# Patient Record
Sex: MALE | Race: BLACK OR AFRICAN AMERICAN | NOT HISPANIC OR LATINO | Employment: OTHER | URBAN - METROPOLITAN AREA
[De-identification: names, ages, dates, MRNs, and addresses within clinical notes are randomized per-mention and may not be internally consistent; named-entity substitution may affect disease eponyms.]

---

## 2017-01-06 RX ORDER — ALLOPURINOL 100 MG/1
TABLET ORAL
Qty: 90 TABLET | Refills: 0 | Status: SHIPPED | OUTPATIENT
Start: 2017-01-06 | End: 2017-04-12 | Stop reason: SDUPTHER

## 2017-01-10 ENCOUNTER — TELEPHONE (OUTPATIENT)
Dept: CARDIOLOGY | Facility: CLINIC | Age: 62
End: 2017-01-10

## 2017-01-10 NOTE — TELEPHONE ENCOUNTER
----- Message from Kaylynn Mueller sent at 1/9/2017  4:33 PM CST -----  Contact: Elsie/wife  Elsie would like to know if his appointment location can be change to the [facility] location  on 1/30/17. Please call her at [phone].    Thanks  Td

## 2017-01-10 NOTE — TELEPHONE ENCOUNTER
I have attempted without success to contact this patient by phone to r/s [employer] ICD check.  No answer, left message to return call to device clinic at [phone].

## 2017-01-11 NOTE — TELEPHONE ENCOUNTER
Wife wanted to move clinic appt to [facility] because of transportation issues.  Advised that device clinic times and locations are dependent on the reps from the companies.  On Jan 30th the [employer] reps will only be at the [facility] clinic.  Wife verbalized understanding, will try to arrange transportation with different company.  Offered appt at [facility] on Feb 23rd, wife states pt cannot wait that long to have his ICD checked, will keep [facility] appt on 1/30/17.

## 2017-01-23 ENCOUNTER — OFFICE VISIT (OUTPATIENT)
Dept: OPHTHALMOLOGY | Facility: CLINIC | Age: 62
End: 2017-01-23
Payer: COMMERCIAL

## 2017-01-23 DIAGNOSIS — H40.1122 PRIMARY OPEN ANGLE GLAUCOMA OF LEFT EYE, MODERATE STAGE: Primary | ICD-10-CM

## 2017-01-23 PROCEDURE — 92012 INTRM OPH EXAM EST PATIENT: CPT | Mod: S$GLB,,, | Performed by: OPHTHALMOLOGY

## 2017-01-23 PROCEDURE — 99999 PR PBB SHADOW E&M-EST. PATIENT-LVL II: CPT | Mod: PBBFAC,,, | Performed by: OPHTHALMOLOGY

## 2017-01-23 NOTE — MR AVS SNAPSHOT
[facility] - Ophthalmology  [address] [place] [address]  Phone: [phone]  Fax: [phone]                  Yan Choudhury   2017 10:00 AM   Office Visit    Description:  Male : 1955   Provider:  LOKI Araiza MD   Department:  [facility] - Ophthalmology           Reason for Visit     Glaucoma           Diagnoses this Visit        Comments    Primary open angle glaucoma of left eye, moderate stage    -  Primary            To Do List           Future Appointments        Provider Department Dept Phone    2017 9:15 AM PACEMAKERCLGISELLE GARCIA'DIMITRI DESAI'Dimitri - Special Cardiology [phone]    2017 10:40 AM Kevin Lindsay MD [facility] - General Surgery [phone]    2017 8:00 AM LOKI Araiza MD [facility] Ophthalmology [phone]    2017 10:40 AM Sandra Estevez MD [facility] Internal Medicine [phone]      Goals (5 Years of Data)     None      Follow-Up and Disposition     Return in about 3 months (around 2017).      [facility]     [facility] Line -  Assistance  Registered nurses in the [facility] Call Center provide clinical advisement, health education, appointment booking, and other advisory services.  Call for this free service at [phone].             Medications           Message regarding Medications     Verify the changes and/or additions to your medication regime listed below are the same as discussed with your clinician today.  If any of these changes or additions are incorrect, please notify your healthcare provider.             Verify that the below list of medications is an accurate representation of the medications you are currently taking.  If none reported, the list may be blank. If incorrect, please contact your healthcare provider. Carry this list with you in case of emergency.           Current Medications     allopurinol (ZYLOPRIM) 100 MG tablet TAKE 1 TABLET (100 MG TOTAL) BY MOUTH ONCE DAILY.    aspirin (ECOTRIN) 81 MG EC  "tablet Take 81 mg by mouth once daily.    BD INSULIN SYRINGE ULTRA-FINE 1/2 mL 31 x 5/16" Syrg USE AS DIRECTED TO INJECT INSULIN TWO TIMES DAILY    BD INSULIN SYRINGE ULTRA-FINE 1/2 mL 31 x 5/16" Syrg USE AS DIRECTED TO INJECT INSULIN TWO TIMES DAILY    bumetanide (BUMEX) 2 MG tablet TAKE 1 TABLET (2 MG TOTAL) BY MOUTH 2 (TWO) TIMES DAILY.    carvedilol (COREG) 25 MG tablet Take 12.5 mg by mouth 2 (two) times daily with meals.    guaifenesin (MUCINEX) 600 mg 12 hr tablet Take 1,200 mg by mouth 2 (two) times daily as needed.     insulin glargine (LANTUS) 100 unit/mL injection 50 units bid    insulin needles, disposable, (BD ULTRA-FINE JOSLYN PEN NEEDLES) 32 x 5/32 " Ndle 1 each by Misc.(Non-Drug; Combo Route) route 4 (four) times daily.    IRON/VITAMIN B COMPLEX (GERITOL ORAL) Take by mouth.    pantoprazole (PROTONIX) 40 MG tablet TAKE 1 TABLET BY MOUTH EVERY DAY    potassium chloride SA (K-DUR,KLOR-CON) 10 MEQ tablet TAKE 1 TABLET BY MOUTH EVERY DAY    simvastatin (ZOCOR) 10 MG tablet TAKE 1 TABLET EVERY EVENING FOR CHOLESTEROL           Clinical Reference Information           Allergies as of 1/23/2017     No Known Allergies      Immunizations Administered on Date of Encounter - 1/23/2017     None      "

## 2017-01-23 NOTE — PROGRESS NOTES
"    ===============================  Yan Choudhury,   61 y.o. male   Last visit JC: :9/23/2016   Last visit eye dept. 9/23/2016  [facility]:  Corrected distance visual acuity was NLP in the right eye and 20/40 in the left eye.  Tonometry     Tonometry (Applanation, 10:21 AM)      Right Left   Pressure  17                  Not recorded         Not recorded        Chief Complaint   Patient presents with    Glaucoma     4 month iop check/  dilate os        HPI     Glaucoma    Additional comments: 4 month iop check/  dilate os           Comments   --DM since 2000  --H/O BDR and DME OS  AVASTIN OS 2-12-14 / LUCENTIS OS 5/1/14 AND 6/25/14   --Focal OS June 2011  --VRTX/VMA OS "would not expect improvement with treatment secondary to   VRTX"  --Prosthesis OD secondary to trauma  --COAG   OFF GTTS  Tmax 25  (-1)  Gonio Open  Low RNFL  Last VF Bjerrum OS       Last edited by Estella Forbes on 1/23/2017 10:15 AM. (History)      Read Studies:   Vitals    ________________  1/23/2017  1. Primary open angle glaucoma of left eye, moderate stage      .  T 17   ervinla r   Former  copag pt   rtc 3-4 months  consdier drops agin       ===========================    "

## 2017-02-02 ENCOUNTER — CLINICAL SUPPORT (OUTPATIENT)
Dept: CARDIOLOGY | Facility: CLINIC | Age: 62
End: 2017-02-02
Payer: COMMERCIAL

## 2017-02-02 DIAGNOSIS — I50.9 ACUTE ON CHRONIC CONGESTIVE HEART FAILURE, UNSPECIFIED CONGESTIVE HEART FAILURE TYPE: ICD-10-CM

## 2017-02-02 DIAGNOSIS — R06.02 SOB (SHORTNESS OF BREATH): ICD-10-CM

## 2017-02-02 PROCEDURE — 93284 PRGRMG EVAL IMPLANTABLE DFB: CPT | Mod: S$GLB,,, | Performed by: INTERNAL MEDICINE

## 2017-02-06 RX ORDER — PANTOPRAZOLE SODIUM 40 MG/1
TABLET, DELAYED RELEASE ORAL
Qty: 30 TABLET | Refills: 4 | Status: SHIPPED | OUTPATIENT
Start: 2017-02-06 | End: 2017-07-27 | Stop reason: SDUPTHER

## 2017-02-21 DIAGNOSIS — I10 ESSENTIAL HYPERTENSION: Primary | ICD-10-CM

## 2017-02-21 RX ORDER — LISINOPRIL 5 MG/1
5 TABLET ORAL DAILY
Qty: 90 TABLET | Refills: 3 | Status: SHIPPED | OUTPATIENT
Start: 2017-02-21 | End: 2018-02-24 | Stop reason: SDUPTHER

## 2017-02-21 NOTE — TELEPHONE ENCOUNTER
----- Message from Jaquelin Toscano sent at 2/21/2017 12:47 PM CST -----  Contact: pt wife   Pt wife was calling to get a refill on lisinopril call to [facility] on [address]. Pt wife can be reach at [phone].

## 2017-04-06 ENCOUNTER — OFFICE VISIT (OUTPATIENT)
Dept: URGENT CARE | Facility: CLINIC | Age: 62
End: 2017-04-06
Payer: COMMERCIAL

## 2017-04-06 ENCOUNTER — HOSPITAL ENCOUNTER (OUTPATIENT)
Dept: RADIOLOGY | Facility: HOSPITAL | Age: 62
Discharge: HOME | End: 2017-04-06
Attending: NURSE PRACTITIONER
Payer: COMMERCIAL

## 2017-04-06 ENCOUNTER — OFFICE VISIT (OUTPATIENT)
Dept: SURGERY | Facility: CLINIC | Age: 62
End: 2017-04-06
Payer: COMMERCIAL

## 2017-04-06 VITALS
DIASTOLIC BLOOD PRESSURE: 72 MMHG | HEART RATE: 59 BPM | TEMPERATURE: 99 F | SYSTOLIC BLOOD PRESSURE: 120 MMHG | WEIGHT: 201.25 LBS | BODY MASS INDEX: 30.6 KG/M2

## 2017-04-06 VITALS
WEIGHT: 202.69 LBS | BODY MASS INDEX: 30.72 KG/M2 | TEMPERATURE: 98 F | HEART RATE: 60 BPM | HEIGHT: 68 IN | SYSTOLIC BLOOD PRESSURE: 100 MMHG | OXYGEN SATURATION: 99 % | DIASTOLIC BLOOD PRESSURE: 60 MMHG

## 2017-04-06 DIAGNOSIS — J18.9 PNEUMONIA OF RIGHT LOWER LOBE DUE TO INFECTIOUS ORGANISM: Primary | ICD-10-CM

## 2017-04-06 DIAGNOSIS — R05.9 COUGH: ICD-10-CM

## 2017-04-06 DIAGNOSIS — R06.2 WHEEZING: ICD-10-CM

## 2017-04-06 DIAGNOSIS — Z93.3 COLOSTOMY IN PLACE: Primary | ICD-10-CM

## 2017-04-06 PROCEDURE — 99212 OFFICE O/P EST SF 10 MIN: CPT | Mod: S$GLB,,, | Performed by: SURGERY

## 2017-04-06 PROCEDURE — 3078F DIAST BP <80 MM HG: CPT | Mod: S$GLB,,, | Performed by: NURSE PRACTITIONER

## 2017-04-06 PROCEDURE — 1160F RVW MEDS BY RX/DR IN RCRD: CPT | Mod: S$GLB,,, | Performed by: SURGERY

## 2017-04-06 PROCEDURE — 3074F SYST BP LT 130 MM HG: CPT | Mod: S$GLB,,, | Performed by: SURGERY

## 2017-04-06 PROCEDURE — 3074F SYST BP LT 130 MM HG: CPT | Mod: S$GLB,,, | Performed by: NURSE PRACTITIONER

## 2017-04-06 PROCEDURE — 99999 PR PBB SHADOW E&M-EST. PATIENT-LVL IV: CPT | Mod: PBBFAC,,, | Performed by: NURSE PRACTITIONER

## 2017-04-06 PROCEDURE — 99214 OFFICE O/P EST MOD 30 MIN: CPT | Mod: S$GLB,,, | Performed by: NURSE PRACTITIONER

## 2017-04-06 PROCEDURE — 71020 XR CHEST PA AND LATERAL: CPT | Mod: 26,,, | Performed by: RADIOLOGY

## 2017-04-06 PROCEDURE — 94640 AIRWAY INHALATION TREATMENT: CPT | Mod: S$GLB,,, | Performed by: NURSE PRACTITIONER

## 2017-04-06 PROCEDURE — 3078F DIAST BP <80 MM HG: CPT | Mod: S$GLB,,, | Performed by: SURGERY

## 2017-04-06 PROCEDURE — 1160F RVW MEDS BY RX/DR IN RCRD: CPT | Mod: S$GLB,,, | Performed by: NURSE PRACTITIONER

## 2017-04-06 PROCEDURE — 99999 PR PBB SHADOW E&M-EST. PATIENT-LVL III: CPT | Mod: PBBFAC,,, | Performed by: SURGERY

## 2017-04-06 PROCEDURE — 71020 XR CHEST PA AND LATERAL: CPT | Mod: TC,PO

## 2017-04-06 RX ORDER — IPRATROPIUM BROMIDE AND ALBUTEROL SULFATE 2.5; .5 MG/3ML; MG/3ML
3 SOLUTION RESPIRATORY (INHALATION)
Status: COMPLETED | OUTPATIENT
Start: 2017-04-06 | End: 2017-04-06

## 2017-04-06 RX ORDER — LEVOFLOXACIN 750 MG/1
750 TABLET ORAL DAILY
Qty: 5 TABLET | Refills: 0 | Status: SHIPPED | OUTPATIENT
Start: 2017-04-06 | End: 2017-04-11

## 2017-04-06 RX ADMIN — IPRATROPIUM BROMIDE AND ALBUTEROL SULFATE 3 ML: 2.5; .5 SOLUTION RESPIRATORY (INHALATION) at 01:04

## 2017-04-06 NOTE — MR AVS SNAPSHOT
[facility] - Urgent Care  [address] [place] [address]  Phone: [phone]  Fax: [phone]                  Yan Choudhury   2017 1:00 PM   Office Visit    Description:  Male : 1955   Provider:  Quinn Charlton NP   Department:  [facility] - Urgent Care           Reason for Visit     URI     Wheezing           Diagnoses this Visit        Comments    Pneumonia of right lower lobe due to infectious organism    -  Primary     Wheezing         Cough                To Do List           Future Appointments        Provider Department Dept Phone    4/10/2017 1:00 PM JORGE Dhillon [facility] - Cardiology [phone]    2017 8:00 AM LOKI Araiza MD [facility] Ophthalmology [phone]    5/15/2017 8:00 AM HOME MONITOR DEVICE CHECK [facility] Arrhythmia Procedures [phone]    2017 10:40 AM Sandra Estevez MD [facility] Internal Medicine [phone]      Goals (5 Years of Data)     None      Follow-Up and Disposition     Return if symptoms worsen or fail to improve.       These Medications        Disp Refills Start End    levoFLOXacin (LEVAQUIN) 750 MG tablet 5 tablet 0 2017    Take 1 tablet (750 mg total) by mouth once daily. - Oral    Pharmacy: [facility]/pharmacy #[id number] - [place], [place] - [address] AT AT [facility] Ph #: [phone]         [facility]     [facility] -  Assistance  Unless otherwise directed by your provider, please contact [facility], our nurse care line that is available for  assistance.     Registered nurses in the [facility] Center provide: appointment scheduling, clinical advisement, health education, and other advisory services.  Call: [phone] (toll free)               Medications           Message regarding Medications     Verify the changes and/or additions to your medication regime listed below are the same as discussed with your clinician today.  If any of these changes or additions  "are incorrect, please notify your healthcare provider.        START taking these NEW medications        Refills    levoFLOXacin (LEVAQUIN) 750 MG tablet 0    Sig: Take 1 tablet (750 mg total) by mouth once daily.    Class: Normal    Route: Oral      These medications were administered today        Dose Freq    albuterol-ipratropium 2.5mg-0.5mg/3mL nebulizer solution 3 mL 3 mL Clinic/HOD 1 time    Sig: Take 3 mLs by nebulization one time.    Class: Normal    Route: Nebulization           Verify that the below list of medications is an accurate representation of the medications you are currently taking.  If none reported, the list may be blank. If incorrect, please contact your healthcare provider. Carry this list with you in case of emergency.           Current Medications     allopurinol (ZYLOPRIM) 100 MG tablet TAKE 1 TABLET (100 MG TOTAL) BY MOUTH ONCE DAILY.    aspirin (ECOTRIN) 81 MG EC tablet Take 81 mg by mouth once daily.    BD INSULIN SYRINGE ULTRA-FINE 1/2 mL 31 x 5/16" Syrg USE AS DIRECTED TO INJECT INSULIN TWO TIMES DAILY    BD INSULIN SYRINGE ULTRA-FINE 1/2 mL 31 x 5/16" Syrg USE AS DIRECTED TO INJECT INSULIN TWO TIMES DAILY    bumetanide (BUMEX) 2 MG tablet TAKE 1 TABLET (2 MG TOTAL) BY MOUTH 2 (TWO) TIMES DAILY.    carvedilol (COREG) 25 MG tablet Take 12.5 mg by mouth 2 (two) times daily with meals.    guaifenesin (MUCINEX) 600 mg 12 hr tablet Take 1,200 mg by mouth 2 (two) times daily as needed.     insulin glargine (LANTUS) 100 unit/mL injection 50 units bid    insulin needles, disposable, (BD ULTRA-FINE JOSLYN PEN NEEDLES) 32 x 5/32 " Ndle 1 each by Misc.(Non-Drug; Combo Route) route 4 (four) times daily.    IRON/VITAMIN B COMPLEX (GERITOL ORAL) Take by mouth.    levoFLOXacin (LEVAQUIN) 750 MG tablet Take 1 tablet (750 mg total) by mouth once daily.    lisinopril (PRINIVIL,ZESTRIL) 5 MG tablet Take 1 tablet (5 mg total) by mouth once daily.    pantoprazole (PROTONIX) 40 MG tablet TAKE 1 TABLET BY " "MOUTH EVERY DAY FOR ACID REFLUX    potassium chloride SA (K-DUR,KLOR-CON) 10 MEQ tablet TAKE 1 TABLET BY MOUTH EVERY DAY    simvastatin (ZOCOR) 10 MG tablet TAKE 1 TABLET EVERY EVENING FOR CHOLESTEROL           Clinical Reference Information           Your Vitals Were     BP Pulse Temp Height    100/60 (BP Location: Right arm, Patient Position: Sitting, BP Method: Manual) 60 97.8 °F (36.6 °C) (Tympanic) 5' 8" (1.727 m)    Weight SpO2 BMI    92 kg (202 lb 11.4 oz) 99% 30.82 kg/m2      Blood Pressure          Most Recent Value    BP  100/60      Allergies as of 4/6/2017     No Known Allergies      Immunizations Administered on Date of Encounter - 4/6/2017     None      Orders Placed During Today's Visit     Future Labs/Procedures Expected by Expires    X-Ray Chest PA And Lateral  4/6/2017 4/6/2018      Administrations This Visit     albuterol-ipratropium 2.5mg-0.5mg/3mL nebulizer solution 3 mL     Admin Date Action Dose Route Administered By             04/06/2017 Given 3 mL Nebulization Linda Calvillo, JAN                      Instructions      Pneumonia (Adult)  Pneumonia is an infection deep within the lungs. It is in the small air sacs (alveoli). Pneumonia may be caused by a virus or bacteria. Pneumonia caused by bacteria is usually treated with an antibiotic. Severe cases may need to be treated in the hospital. Milder cases can be treated at home. Symptoms usually start to get better during the first 2 days of treatment.    Home care  Follow these guidelines when caring for yourself at home:  · Rest at home for the first 2 to 3 days, or until you feel stronger. Dont let yourself get overly tired when you go back to your activities.  · Stay away from cigarette smoke - yours or other peoples.  · You may use acetaminophen or ibuprofen to control fever or pain, unless another medicine was prescribed. If you have chronic liver or kidney disease, talk with your health care provider before using these medicines. " Also talk with your provider if youve had a stomach ulcer or GI bleeding. Dont give aspirin to anyone younger than 18 years of age who is ill with a fever. It may cause severe liver damage.  · Your appetite may be poor, so a light diet is fine.  · Drink 6 to 8 glasses of fluids every day to make sure you are getting enough fluids. Beverages can include water, sport drinks, sodas without caffeine, juices, tea, or soup. Fluids will help loosen secretions in the lung. This will make it easier for you to cough up the phlegm (sputum). If you also have heart or kidney disease, check with your health care provider before you drink extra fluids.  · Take antibiotic medicine prescribed until it is all gone, even if you are feeling better after a few days.  Follow-up care  Follow up with your health care provider in the next 2 to 3 days, or as advised. This is to be sure the medicine is helping you get better.  If you are 65 or older, you should get a pneumococcal vaccine and a yearly flu (influenza) shot. You should also get these vaccines if you have chronic lung disease like asthma, emphysema, or COPD. Ask your provider about this.  When to seek medical advice  Call your health care provider right away if any of these occur:  · You dont get better within the first 48 hours of treatment  · Shortness of breath gets worse  · Rapid breathing (more than 25 breaths per minute)  · Coughing up blood  · Chest pain gets worse with breathing  · Fever of 102°F (38°C) or higher that doesnt get better with fever medicine  · Weakness, dizziness, or fainting that gets worse  · Thirst or dry mouth that gets worse  · Sinus pain, headache, or a stiff neck  · Chest pain not caused by coughing  Date Last Reviewed: 12/23/2014  © 0125-1187 [employer]. [address] [place] [address], [place] [address]. All rights reserved. This information is not intended as a substitute for professional medical care. Always follow your healthcare  professional's instructions.             Language Assistance Services     ATTENTION: Language assistance services are available, free of charge. Please call [phone].      ATENCIÓN: Si habla sahara, tiene a roman disposición servicios gratuitos de asistencia lingüística. Llame al [phone].     CHÚ Ý: N?u b?n nói Ti?ng Vi?t, có các d?ch v? h? tr? ngôn ng? mi?n phí dành cho b?n. G?i s? [phone].         [facility] complies with applicable Federal civil rights laws and does not discriminate on the basis of race, color, national origin, age, disability, or sex.

## 2017-04-06 NOTE — PROGRESS NOTES
Subjective:       Patient ID: Yan Choudhury is a 61 y.o. male.    Check on colostomy    Chief Complaint: Follow-up (6 months)    HPI Comments: He underwent a Aguila procedure severe diverticulitis.  He developed a wound infection postoperatively.  This was treated by local wound care.  He has a left lower quadrant colostomy that's functioning.  He notices that side of his abdominal wall protrudes a little bit more.  Currently he has a cough.    The patient gets fatigue and shortness of breath with any activity    Review of Systems   Constitutional: Positive for fever.   Respiratory: Positive for cough and shortness of breath.    Cardiovascular: Negative.    Gastrointestinal:        Hospital wall protrudes more on the left around the colostomy than on the right   Endocrine: Negative.    Genitourinary: Negative.        Objective:      Physical Exam   Constitutional: No distress.   Chronically ill in appearance   Cardiovascular: Normal rate and regular rhythm.    Pulmonary/Chest: Effort normal. No respiratory distress. He has no wheezes.   Cough, bilateral rhonchi   Abdominal: Bowel sounds are normal.   There is a left lower quadrant colostomy.  There is protrusion of the left lower abdominal wall greater than the right, there is a reducible umbilical hernia.   Vitals reviewed.      Assessment:      sigmoid resection and end colostomy for severe diverticulitis.  Small umbilical hernia, probable peristomal hernia  Plan:     at this point I would not recommend operative intervention for the umbilical or probable parastomal hernia as the risk of cardiovascular/pulmonary complications of any surgical intervention would outweigh the benefits at this point.    Patient should follow-up with surgery if there are concerns about his colostomy.    This was discussed with the patient and his wife

## 2017-04-06 NOTE — PATIENT INSTRUCTIONS
You have developed a hernia around your colostomy and a small hernia at your bellybutton.    I would not recommend any surgical intervention unless you ran into significant problems.    If you ever have a lot of pain around her colostomy on the colostomy stops working please let us know.  We will otherwise see you back as an as-needed basis

## 2017-04-06 NOTE — MR AVS SNAPSHOT
[facility] Surgery  [address] [place] [address]  Phone: [phone]  Fax: [phone]                  Yan Choudhury   2017 10:40 AM   Office Visit    Description:  Male : 1955   Provider:  Kevin Lindsay MD   Department:  [facility] Surgery           Reason for Visit     Follow-up           Diagnoses this Visit        Comments    Colostomy in place    -  Primary            To Do List           Future Appointments        Provider Department Dept Phone    2017 8:00 AM LOKI Araiza MD [facility] - Ophthalmology [phone]    5/15/2017 8:00 AM HOME MONITOR DEVICE CHECK [facility] Arrhythmia Procedures [phone]    2017 10:40 AM Sandra Estevez MD [facility] Internal Medicine [phone]      Goals (5 Years of Data)     None      [facility]     [facility] Nurse Care Line -  Assistance  Unless otherwise directed by your provider, please contact [facility], our nurse care line that is available for  assistance.     Registered nurses in the [facility] Center provide: appointment scheduling, clinical advisement, health education, and other advisory services.  Call: [phone] (toll free)               Medications           Message regarding Medications     Verify the changes and/or additions to your medication regime listed below are the same as discussed with your clinician today.  If any of these changes or additions are incorrect, please notify your healthcare provider.             Verify that the below list of medications is an accurate representation of the medications you are currently taking.  If none reported, the list may be blank. If incorrect, please contact your healthcare provider. Carry this list with you in case of emergency.           Current Medications     allopurinol (ZYLOPRIM) 100 MG tablet TAKE 1 TABLET (100 MG TOTAL) BY MOUTH ONCE DAILY.    aspirin (ECOTRIN) 81 MG EC tablet Take 81 mg by mouth once daily.    BD INSULIN  "SYRINGE ULTRA-FINE 1/2 mL 31 x 5/16" Syrg USE AS DIRECTED TO INJECT INSULIN TWO TIMES DAILY    BD INSULIN SYRINGE ULTRA-FINE 1/2 mL 31 x 5/16" Syrg USE AS DIRECTED TO INJECT INSULIN TWO TIMES DAILY    bumetanide (BUMEX) 2 MG tablet TAKE 1 TABLET (2 MG TOTAL) BY MOUTH 2 (TWO) TIMES DAILY.    carvedilol (COREG) 25 MG tablet Take 12.5 mg by mouth 2 (two) times daily with meals.    guaifenesin (MUCINEX) 600 mg 12 hr tablet Take 1,200 mg by mouth 2 (two) times daily as needed.     insulin glargine (LANTUS) 100 unit/mL injection 50 units bid    insulin needles, disposable, (BD ULTRA-FINE JOSLYN PEN NEEDLES) 32 x 5/32 " Ndle 1 each by Misc.(Non-Drug; Combo Route) route 4 (four) times daily.    IRON/VITAMIN B COMPLEX (GERITOL ORAL) Take by mouth.    lisinopril (PRINIVIL,ZESTRIL) 5 MG tablet Take 1 tablet (5 mg total) by mouth once daily.    pantoprazole (PROTONIX) 40 MG tablet TAKE 1 TABLET BY MOUTH EVERY DAY FOR ACID REFLUX    potassium chloride SA (K-DUR,KLOR-CON) 10 MEQ tablet TAKE 1 TABLET BY MOUTH EVERY DAY    simvastatin (ZOCOR) 10 MG tablet TAKE 1 TABLET EVERY EVENING FOR CHOLESTEROL           Clinical Reference Information           Your Vitals Were     BP Pulse Temp Weight BMI    120/72 (BP Location: Right arm, Patient Position: Sitting) 59 98.5 °F (36.9 °C) (Oral) 91.3 kg (201 lb 4.5 oz) 30.6 kg/m2      Blood Pressure          Most Recent Value    BP  120/72      Allergies as of 4/6/2017     No Known Allergies      Immunizations Administered on Date of Encounter - 4/6/2017     None      Instructions    You have developed a hernia around your colostomy and a small hernia at your bellybutton.    I would not recommend any surgical intervention unless you ran into significant problems.    If you ever have a lot of pain around her colostomy on the colostomy stops working please let us know.  We will otherwise see you back as an as-needed basis       Language Assistance Services     ATTENTION: Language assistance services " are available, free of charge. Please call [phone].      ATENCIÓN: Si habla wilfredoañol, tiene a roman disposición servicios gratuitos de asistencia lingüística. Llame al [phone].     CHÚ Ý: N?u b?n nói Ti?ng Vi?t, có các d?ch v? h? tr? ngôn ng? mi?n phí dành cho b?n. G?i s? [phone].         [facility] - General Surgery complies with applicable Federal civil rights laws and does not discriminate on the basis of race, color, national origin, age, disability, or sex.

## 2017-04-06 NOTE — PATIENT INSTRUCTIONS
Pneumonia (Adult)  Pneumonia is an infection deep within the lungs. It is in the small air sacs (alveoli). Pneumonia may be caused by a virus or bacteria. Pneumonia caused by bacteria is usually treated with an antibiotic. Severe cases may need to be treated in the hospital. Milder cases can be treated at home. Symptoms usually start to get better during the first 2 days of treatment.    Home care  Follow these guidelines when caring for yourself at home:  · Rest at home for the first 2 to 3 days, or until you feel stronger. Dont let yourself get overly tired when you go back to your activities.  · Stay away from cigarette smoke - yours or other peoples.  · You may use acetaminophen or ibuprofen to control fever or pain, unless another medicine was prescribed. If you have chronic liver or kidney disease, talk with your health care provider before using these medicines. Also talk with your provider if youve had a stomach ulcer or GI bleeding. Dont give aspirin to anyone younger than 18 years of age who is ill with a fever. It may cause severe liver damage.  · Your appetite may be poor, so a light diet is fine.  · Drink 6 to 8 glasses of fluids every day to make sure you are getting enough fluids. Beverages can include water, sport drinks, sodas without caffeine, juices, tea, or soup. Fluids will help loosen secretions in the lung. This will make it easier for you to cough up the phlegm (sputum). If you also have heart or kidney disease, check with your health care provider before you drink extra fluids.  · Take antibiotic medicine prescribed until it is all gone, even if you are feeling better after a few days.  Follow-up care  Follow up with your health care provider in the next 2 to 3 days, or as advised. This is to be sure the medicine is helping you get better.  If you are 65 or older, you should get a pneumococcal vaccine and a yearly flu (influenza) shot. You should also get these vaccines if you have  chronic lung disease like asthma, emphysema, or COPD. Ask your provider about this.  When to seek medical advice  Call your health care provider right away if any of these occur:  · You dont get better within the first 48 hours of treatment  · Shortness of breath gets worse  · Rapid breathing (more than 25 breaths per minute)  · Coughing up blood  · Chest pain gets worse with breathing  · Fever of 102°F (38°C) or higher that doesnt get better with fever medicine  · Weakness, dizziness, or fainting that gets worse  · Thirst or dry mouth that gets worse  · Sinus pain, headache, or a stiff neck  · Chest pain not caused by coughing  Date Last Reviewed: 12/23/2014  © 6086-3269 [email or web address]. [address] [place] [address], [place] [address]. All rights reserved. This information is not intended as a substitute for professional medical care. Always follow your healthcare professional's instructions.

## 2017-04-06 NOTE — PROGRESS NOTES
Subjective:       Patient ID: Yan Choudhury is a 61 y.o. male.    Chief Complaint: URI and Wheezing    HPI Comments: Pt is a 61 year old male to clinic today with complaints of cough, congestion, and wheezing that began 2 weeks ago.     URI    This is a new problem. The current episode started 1 to 4 weeks ago (2 weeks). The problem has been gradually worsening. There has been no fever. Associated symptoms include congestion, a plugged ear sensation, rhinorrhea, sinus pain, sneezing and wheezing. Pertinent negatives include no abdominal pain, chest pain, coughing, diarrhea, dysuria, ear pain, headaches, joint pain, joint swelling, nausea, neck pain, rash, sore throat or vomiting. Treatments tried: mucinex. nyquil. The treatment provided mild relief.     Review of Systems   Constitutional: Negative for chills, diaphoresis, fatigue and fever.   HENT: Positive for congestion, postnasal drip, rhinorrhea, sinus pressure and sneezing. Negative for ear discharge, ear pain, sore throat and trouble swallowing.    Eyes: Negative for pain.   Respiratory: Positive for shortness of breath and wheezing. Negative for cough and chest tightness.    Cardiovascular: Negative for chest pain and palpitations.   Gastrointestinal: Negative for abdominal pain, diarrhea, nausea and vomiting.   Genitourinary: Negative for dysuria.   Musculoskeletal: Negative for back pain, joint pain, myalgias and neck pain.   Skin: Negative for rash.   Neurological: Negative for dizziness, light-headedness and headaches.       Objective:      Physical Exam   Constitutional: He is oriented to person, place, and time. He appears well-developed and well-nourished. No distress.   HENT:   Head: Normocephalic.   Right Ear: Tympanic membrane, external ear and ear canal normal. No tenderness. Tympanic membrane is not bulging.   Left Ear: Tympanic membrane, external ear and ear canal normal. No tenderness. Tympanic membrane is not bulging.   Nose: Mucosal edema and  rhinorrhea present. Right sinus exhibits maxillary sinus tenderness. Right sinus exhibits no frontal sinus tenderness. Left sinus exhibits no frontal sinus tenderness.   Mouth/Throat: Uvula is midline, oropharynx is clear and moist and mucous membranes are normal. No oropharyngeal exudate, posterior oropharyngeal edema or posterior oropharyngeal erythema.   Eyes: Conjunctivae and EOM are normal. Pupils are equal, round, and reactive to light.   Neck: Normal range of motion. Neck supple.   Cardiovascular: Normal rate, regular rhythm, S1 normal, S2 normal and normal heart sounds.  Exam reveals no gallop and no friction rub.    No murmur heard.  Pulmonary/Chest: Effort normal. No accessory muscle usage. No apnea, no tachypnea and no bradypnea. No respiratory distress. He has no decreased breath sounds. He has wheezes in the right upper field, the right lower field, the left upper field and the left lower field. He has no rhonchi. He has no rales.   Transmitted airway noise     Lymphadenopathy:        Head (right side): No submental, no submandibular and no tonsillar adenopathy present.        Head (left side): No submental, no submandibular and no tonsillar adenopathy present.     He has no cervical adenopathy.   Neurological: He is alert and oriented to person, place, and time.   Skin: Skin is warm and dry. No rash noted. He is not diaphoretic.   Psychiatric: He has a normal mood and affect. His speech is normal and behavior is normal.   Nursing note and vitals reviewed.      Assessment:       1. Pneumonia of right lower lobe due to infectious organism    2. Wheezing    3. Cough        Plan:   Pneumonia of right lower lobe due to infectious organism  -     levoFLOXacin (LEVAQUIN) 750 MG tablet; Take 1 tablet (750 mg total) by mouth once daily.  Dispense: 5 tablet; Refill: 0    Wheezing  -     X-Ray Chest PA And Lateral; Future; Expected date: 4/6/17  -     albuterol-ipratropium 2.5mg-0.5mg/3mL nebulizer solution 3 mL;  Take 3 mLs by nebulization one time.    Cough  -     X-Ray Chest PA And Lateral; Future; Expected date: 4/6/17  -     albuterol-ipratropium 2.5mg-0.5mg/3mL nebulizer solution 3 mL; Take 3 mLs by nebulization one time.      Recommend F/U with PCP at completion of abx for repeat xray.  Recommend F/U with cards for eval and management of xray finding.    Antibiotic Therapy  Take antibiotics for entire course.  Do not save medications for later, all medications must be taken for full regimen.  Follow up with PCP or ER if symptoms do not improve or worsen.

## 2017-04-10 ENCOUNTER — LAB VISIT (OUTPATIENT)
Dept: LAB | Facility: HOSPITAL | Age: 62
End: 2017-04-10
Attending: INTERNAL MEDICINE
Payer: COMMERCIAL

## 2017-04-10 ENCOUNTER — OFFICE VISIT (OUTPATIENT)
Dept: CARDIOLOGY | Facility: CLINIC | Age: 62
End: 2017-04-10
Payer: COMMERCIAL

## 2017-04-10 ENCOUNTER — CLINICAL SUPPORT (OUTPATIENT)
Dept: CARDIOLOGY | Facility: CLINIC | Age: 62
End: 2017-04-10
Payer: COMMERCIAL

## 2017-04-10 VITALS
HEIGHT: 68 IN | WEIGHT: 204.56 LBS | SYSTOLIC BLOOD PRESSURE: 118 MMHG | DIASTOLIC BLOOD PRESSURE: 72 MMHG | BODY MASS INDEX: 31 KG/M2 | HEART RATE: 64 BPM

## 2017-04-10 DIAGNOSIS — I25.5 ISCHEMIC CARDIOMYOPATHY: ICD-10-CM

## 2017-04-10 DIAGNOSIS — I25.10 CAD, MULTIPLE VESSEL: Chronic | ICD-10-CM

## 2017-04-10 DIAGNOSIS — I50.42 CHRONIC COMBINED SYSTOLIC AND DIASTOLIC HEART FAILURE: Primary | Chronic | ICD-10-CM

## 2017-04-10 DIAGNOSIS — E11.29 TYPE 2 DIABETES MELLITUS WITH RENAL MANIFESTATIONS NOT AT GOAL: ICD-10-CM

## 2017-04-10 DIAGNOSIS — E11.59 HYPERTENSION ASSOCIATED WITH DIABETES: ICD-10-CM

## 2017-04-10 DIAGNOSIS — E08.9 DIABETES MELLITUS DUE TO UNDERLYING CONDITION WITHOUT COMPLICATION, WITHOUT LONG-TERM CURRENT USE OF INSULIN: ICD-10-CM

## 2017-04-10 DIAGNOSIS — R06.02 SOB (SHORTNESS OF BREATH): ICD-10-CM

## 2017-04-10 DIAGNOSIS — I15.2 HYPERTENSION ASSOCIATED WITH DIABETES: ICD-10-CM

## 2017-04-10 DIAGNOSIS — I27.20 PULMONARY HTN: ICD-10-CM

## 2017-04-10 DIAGNOSIS — I44.7 LBBB (LEFT BUNDLE BRANCH BLOCK): ICD-10-CM

## 2017-04-10 DIAGNOSIS — Z95.810 BIVENTRICULAR ICD (IMPLANTABLE CARDIOVERTER-DEFIBRILLATOR) IN PLACE: Chronic | ICD-10-CM

## 2017-04-10 DIAGNOSIS — I50.42 CHRONIC COMBINED SYSTOLIC AND DIASTOLIC HEART FAILURE: Chronic | ICD-10-CM

## 2017-04-10 DIAGNOSIS — G47.33 OSA ON CPAP: ICD-10-CM

## 2017-04-10 LAB
ALBUMIN SERPL BCP-MCNC: 2.7 G/DL
ALP SERPL-CCNC: 132 U/L
ALT SERPL W/O P-5'-P-CCNC: 25 U/L
ANION GAP SERPL CALC-SCNC: 6 MMOL/L
AORTIC VALVE REGURGITATION: ABNORMAL
AST SERPL-CCNC: 53 U/L
BILIRUB SERPL-MCNC: 1.2 MG/DL
BNP SERPL-MCNC: 1981 PG/ML
BUN SERPL-MCNC: 30 MG/DL
CALCIUM SERPL-MCNC: 9.7 MG/DL
CHLORIDE SERPL-SCNC: 107 MMOL/L
CO2 SERPL-SCNC: 31 MMOL/L
CREAT SERPL-MCNC: 2.2 MG/DL
DIASTOLIC DYSFUNCTION: YES
EST. GFR  (AFRICAN AMERICAN): 36 ML/MIN/1.73 M^2
EST. GFR  (NON AFRICAN AMERICAN): 31 ML/MIN/1.73 M^2
ESTIMATED PA SYSTOLIC PRESSURE: 58.95
GLUCOSE SERPL-MCNC: 107 MG/DL
POTASSIUM SERPL-SCNC: 4.2 MMOL/L
PROT SERPL-MCNC: 7.4 G/DL
RETIRED EF AND QEF - SEE NOTES: 30 (ref 55–65)
SODIUM SERPL-SCNC: 144 MMOL/L
TRICUSPID VALVE REGURGITATION: ABNORMAL

## 2017-04-10 PROCEDURE — 99214 OFFICE O/P EST MOD 30 MIN: CPT | Mod: S$GLB,,, | Performed by: PHYSICIAN ASSISTANT

## 2017-04-10 PROCEDURE — 3074F SYST BP LT 130 MM HG: CPT | Mod: S$GLB,,, | Performed by: PHYSICIAN ASSISTANT

## 2017-04-10 PROCEDURE — 3044F HG A1C LEVEL LT 7.0%: CPT | Mod: S$GLB,,, | Performed by: PHYSICIAN ASSISTANT

## 2017-04-10 PROCEDURE — 99999 PR PBB SHADOW E&M-EST. PATIENT-LVL III: CPT | Mod: PBBFAC,,, | Performed by: PHYSICIAN ASSISTANT

## 2017-04-10 PROCEDURE — 36415 COLL VENOUS BLD VENIPUNCTURE: CPT | Mod: PO

## 2017-04-10 PROCEDURE — 93307 TTE W/O DOPPLER COMPLETE: CPT | Mod: S$GLB,,, | Performed by: NUCLEAR MEDICINE

## 2017-04-10 PROCEDURE — 1160F RVW MEDS BY RX/DR IN RCRD: CPT | Mod: S$GLB,,, | Performed by: PHYSICIAN ASSISTANT

## 2017-04-10 PROCEDURE — 3078F DIAST BP <80 MM HG: CPT | Mod: S$GLB,,, | Performed by: PHYSICIAN ASSISTANT

## 2017-04-10 PROCEDURE — 3066F NEPHROPATHY DOC TX: CPT | Mod: S$GLB,,, | Performed by: PHYSICIAN ASSISTANT

## 2017-04-10 PROCEDURE — 83880 ASSAY OF NATRIURETIC PEPTIDE: CPT

## 2017-04-10 PROCEDURE — 80053 COMPREHEN METABOLIC PANEL: CPT | Mod: PO

## 2017-04-10 PROCEDURE — 93000 ELECTROCARDIOGRAM COMPLETE: CPT | Mod: S$GLB,,, | Performed by: INTERNAL MEDICINE

## 2017-04-10 NOTE — MR AVS SNAPSHOT
[facility]  [address] [place] [address]  Phone: [phone]  Fax: [phone]                  Yan Choudhury   4/10/2017 1:00 PM   Office Visit    Description:  Male : 1955   Provider:  JORGE Dhillon   Department:  [facility] - Cardiology           Reason for Visit     Congestive Heart Failure           Diagnoses this Visit        Comments    CAD, multiple vessel    -  Primary     Pulmonary HTN         JAHAIRA on CPAP         Chronic combined systolic and diastolic heart failure         Hypertension associated with diabetes         Ischemic cardiomyopathy         LBBB (left bundle branch block)         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         Biventricular ICD (implantable cardioverter-defibrillator) in place         SOB (shortness of breath)                To Do List           Future Appointments        Provider Department Dept Phone    4[facility]10[facility]2017 3:30 PM [phone]    2017 3:00 PM Otilia Prado RD, CDE [facility] Diabetes Management [phone]    2017 8:00 AM LOKI Araiza MD [facility] - Ophthalmology [phone]    5/15/2017 8:00 AM HOME MONITOR DEVICE CHECK [facility] Arrhythmia Procedures [phone]    5/15/2017 9:00 AM Sandra Estevez MD [facility] Internal Medicine [phone]      Goals (5 Years of Data)     None      [facility]     [facility] On Call Nurse Care Line - 24/ Assistance  Unless otherwise directed by your provider, please contact [facility], our nurse care line that is available for  assistance.     Registered nurses in the [facility] provide: appointment scheduling, clinical advisement, health education, and other advisory services.  Call: [phone] (toll free)               Medications           Message regarding Medications     Verify the changes and/or additions to your medication regime listed below are the same as discussed with your clinician  "today.  If any of these changes or additions are incorrect, please notify your healthcare provider.             Verify that the below list of medications is an accurate representation of the medications you are currently taking.  If none reported, the list may be blank. If incorrect, please contact your healthcare provider. Carry this list with you in case of emergency.           Current Medications     allopurinol (ZYLOPRIM) 100 MG tablet TAKE 1 TABLET (100 MG TOTAL) BY MOUTH ONCE DAILY.    aspirin (ECOTRIN) 81 MG EC tablet Take 81 mg by mouth once daily.    BD INSULIN SYRINGE ULTRA-FINE 1/2 mL 31 x 5/16" Syrg USE AS DIRECTED TO INJECT INSULIN TWO TIMES DAILY    BD INSULIN SYRINGE ULTRA-FINE 1/2 mL 31 x 5/16" Syrg USE AS DIRECTED TO INJECT INSULIN TWO TIMES DAILY    bumetanide (BUMEX) 2 MG tablet TAKE 1 TABLET (2 MG TOTAL) BY MOUTH 2 (TWO) TIMES DAILY.    carvedilol (COREG) 25 MG tablet Take 12.5 mg by mouth 2 (two) times daily with meals.    guaifenesin (MUCINEX) 600 mg 12 hr tablet Take 1,200 mg by mouth 2 (two) times daily as needed.     insulin glargine (LANTUS) 100 unit/mL injection 50 units bid    insulin needles, disposable, (BD ULTRA-FINE JOSLYN PEN NEEDLES) 32 x 5/32 " Ndle 1 each by Misc.(Non-Drug; Combo Route) route 4 (four) times daily.    IRON/VITAMIN B COMPLEX (GERITOL ORAL) Take by mouth.    levoFLOXacin (LEVAQUIN) 750 MG tablet Take 1 tablet (750 mg total) by mouth once daily.    lisinopril (PRINIVIL,ZESTRIL) 5 MG tablet Take 1 tablet (5 mg total) by mouth once daily.    pantoprazole (PROTONIX) 40 MG tablet TAKE 1 TABLET BY MOUTH EVERY DAY FOR ACID REFLUX    potassium chloride SA (K-DUR,KLOR-CON) 10 MEQ tablet TAKE 1 TABLET BY MOUTH EVERY DAY    simvastatin (ZOCOR) 10 MG tablet TAKE 1 TABLET EVERY EVENING FOR CHOLESTEROL           Clinical Reference Information           Your Vitals Were     BP Pulse Height Weight BMI    118/72 64 5' 8" (1.727 m) 92.8 kg (204 lb 9.4 oz) 31.11 kg/m2      Blood Pressure "          Most Recent Value    BP  118/72      Allergies as of 4/10/2017     No Known Allergies      Immunizations Administered on Date of Encounter - 4/10/2017     None      Orders Placed During Today's Visit     Future Labs/Procedures Expected by Expires    Brain natriuretic peptide  4/10/2017 6/9/2018    Comprehensive metabolic panel  4/10/2017 6/9/2018    2D Echo Only  As directed 4/10/2018    Echo doppler color flow  As directed 4/10/2018    EKG 12-lead  As directed 4/10/2018      Language Assistance Services     ATTENTION: Language assistance services are available, free of charge. Please call [phone].      ATENCIÓN: Si habla español, tiene a roman disposición servicios gratuitos de asistencia lingüística. Llame al [phone].     CHÚ Ý: N?u b?n nói Ti?ng Vi?t, có các d?ch v? h? tr? ngôn ng? mi?n phí dành cho b?n. G?i s? [phone].         [facility] complies with applicable Federal civil rights laws and does not discriminate on the basis of race, color, national origin, age, disability, or sex.

## 2017-04-10 NOTE — PROGRESS NOTES
Subjective:    Patient ID:  Yan Choudhury is a 61 y.o. male who presents for follow-up of Congestive Heart Failure      HPI   Mr. Choudhury is a 61 year old male patient with a PMHx of CAD/ICM (s/p [facility] 2009, diffuse CAD), DCM, LBBB, HTN, CRI, DM, dyslipidemia, and obesity who presents today for follow-up. Patient recently seen by PCP and complained of SOB and wheezing. CXR was ordered and showed enlarged cardiac silhouette, pulmonary venous congestion, and hazy opacities in right lung base, in which infection could not be excluded. Patient was subsequently started on abx and instructed to follow-up with us. He returns today and states he is doing ok. He is still having some SOB and cough, but feels it has improved since last visit. States he feels like he may have some fluid on board. Admits to some lower extremity edema and weight gain (204 vs 193, normally). Denies any PND, orthopnea, or abdominal bloating. No chest pain, fever, chills, palpitations, lightheadedness, or dizziness. No near syncope or syncope. No AICD shocks.  Patient reports compliance with his medications, is taking diuretic. Wife states he has been eating more salt and drinking more fluid lately.  CXR reviewed, will get echo and labs today.     Review of Systems   Constitution: Negative for chills, decreased appetite, fever, weakness and malaise/fatigue.   HENT: Negative for congestion, headaches, hoarse voice and sore throat.    Eyes: Negative for blurred vision and discharge.   Cardiovascular: Positive for dyspnea on exertion and leg swelling. Negative for chest pain, claudication, cyanosis, irregular heartbeat, near-syncope, orthopnea, palpitations and paroxysmal nocturnal dyspnea.   Respiratory: Positive for cough and shortness of breath. Negative for hemoptysis, snoring, sputum production and wheezing.    Endocrine: Negative for cold intolerance and heat intolerance.   Hematologic/Lymphatic: Negative for bleeding problem. Does not bruise/bleed  "easily.   Skin: Negative for rash.   Musculoskeletal: Negative for arthritis, back pain, joint pain, joint swelling, muscle cramps, muscle weakness and myalgias.   Gastrointestinal: Negative for abdominal pain, constipation, diarrhea, heartburn, melena and nausea.   Genitourinary: Negative for hematuria.   Neurological: Negative for dizziness, focal weakness, light-headedness, loss of balance, numbness, paresthesias and seizures.   Psychiatric/Behavioral: Negative for memory loss. The patient does not have insomnia.    Allergic/Immunologic: Negative for hives.       /72  Pulse 64  Ht 5' 8" (1.727 m)  Wt 92.8 kg (204 lb 9.4 oz)  BMI 31.11 kg/m2    Objective:    Physical Exam   Constitutional: He is oriented to person, place, and time. He appears well-developed and well-nourished. No distress.   HENT:   Head: Normocephalic and atraumatic.   Eyes: Pupils are equal, round, and reactive to light. Right eye exhibits no discharge. Left eye exhibits no discharge.   Neck: Neck supple. JVD present. No tracheal deviation present. No thyromegaly present.   Cardiovascular: Normal rate, regular rhythm, S1 normal, S2 normal and normal heart sounds.  PMI is not displaced.  Exam reveals no gallop, no S3, no S4 and no friction rub.    No murmur heard.  Pulses:       Radial pulses are 2+ on the right side, and 2+ on the left side.   Pulmonary/Chest: Effort normal. No respiratory distress. He has no wheezes. He has rales (LLL, LML, RLL, RML).   Abdominal: Soft. He exhibits distension. There is no tenderness. There is no rebound.   Colostomy present   Musculoskeletal: He exhibits edema (1-2+ BLE).   Neurological: He is alert and oriented to person, place, and time.   Skin: Skin is warm and dry. He is not diaphoretic. No erythema.   Psychiatric: He has a normal mood and affect. His behavior is normal. Thought content normal.   Nursing note and vitals reviewed.      2D Echo CONCLUSIONS     1 - Mild left atrial enlargement.     2 " - Mild left ventricular enlargement.     3 - Eccentric hypertrophy.     4 - Moderately depressed left ventricular systolic function (EF 30-35%).     5 - Left ventricular diastolic dysfunction.     6 - Low normal right ventricular systolic function .     7 - Moderate aortic regurgitation.     8 - Moderate tricuspid regurgitation.     9 - Pulmonary hypertension. The estimated PA systolic pressure is 59 mmHg.           Assessment:       1. Chronic combined systolic and diastolic heart failure    2. CAD, multiple vessel    3. Pulmonary HTN    4. JAHAIRA on CPAP    5. Hypertension associated with diabetes    6. Ischemic cardiomyopathy    7. LBBB (left bundle branch block)    8. 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 Biventricular ICD (implantable cardioverter-defibrillator) in place    10. SOB (shortness of breath)       Patient appears decompensated, likely in part due to dietary non-compliance. Echo today did not show any evidence of pericardial effusion, EF remains stable 30%. Pulmonary HTN noted. Given patient's symptoms and Xray, I advised ED evaluation and IV diuresis. He declined stating he would like to try po medicine. Will check CMP and BNP today. Patient is aware if labs are severely abnormal, I will recommend ED evaluation.   Plan:     -Continue current medical management and risk factor modification  -Cardiac, low salt diet  -CMP, BNP today with further rec's to follow  -If labs severely abnormal, will refer to ED    Chart reviewed. Dr. Calvillo agrees with plan as outlined above.         ADDENDUM:    Patient denied orthopnea in office however was orthpneic during my exam and during echo. Reviewed CMP, creatinine 2.2, likely due to congestion. At this point, feel it is safest for patient to be evaluated in ED and receive IV diuresis. I spoke with patient's wife who verbalized understanding. Dr. Chowdhury notified of patient's impending arrival.

## 2017-04-10 NOTE — TELEPHONE ENCOUNTER
----- Message from Ankita Diaz sent at 4/7/2017  3:17 PM CDT -----  Contact: pt's wife Elsie Zimmerman [phone] requests pt's insulin to be refilled. She can be reached at [phone] (home)

## 2017-04-11 ENCOUNTER — TELEPHONE (OUTPATIENT)
Dept: CARDIOLOGY | Facility: CLINIC | Age: 62
End: 2017-04-11

## 2017-04-11 RX ORDER — INSULIN GLARGINE 100 [IU]/ML
INJECTION, SOLUTION SUBCUTANEOUS
Qty: 30 ML | Refills: 11 | Status: SHIPPED | OUTPATIENT
Start: 2017-04-11 | End: 2018-05-02 | Stop reason: SDUPTHER

## 2017-04-11 NOTE — TELEPHONE ENCOUNTER
Spoke with pt wife states pt went to ER on yesterday at [facility].

## 2017-04-11 NOTE — TELEPHONE ENCOUNTER
----- Message from JORGE Dhillon sent at 4/11/2017  7:44 AM CDT -----  Please verify that patient went to ED

## 2017-04-12 RX ORDER — ALLOPURINOL 100 MG/1
TABLET ORAL
Qty: 90 TABLET | Refills: 0 | Status: SHIPPED | OUTPATIENT
Start: 2017-04-12 | End: 2017-07-20 | Stop reason: SDUPTHER

## 2017-04-18 RX ORDER — SIMVASTATIN 10 MG/1
TABLET, FILM COATED ORAL
Qty: 30 TABLET | Refills: 8 | Status: SHIPPED | OUTPATIENT
Start: 2017-04-18 | End: 2017-08-02 | Stop reason: SDUPTHER

## 2017-04-19 ENCOUNTER — OFFICE VISIT (OUTPATIENT)
Dept: CARDIOLOGY | Facility: CLINIC | Age: 62
DRG: 291 | End: 2017-04-19
Payer: COMMERCIAL

## 2017-04-19 ENCOUNTER — HOSPITAL ENCOUNTER (INPATIENT)
Facility: HOSPITAL | Age: 62
LOS: 3 days | Discharge: HOME | DRG: 291 | End: 2017-04-22
Attending: EMERGENCY MEDICINE | Admitting: INTERNAL MEDICINE
Payer: COMMERCIAL

## 2017-04-19 ENCOUNTER — NUTRITION (OUTPATIENT)
Dept: DIABETES | Facility: CLINIC | Age: 62
DRG: 291 | End: 2017-04-19
Payer: COMMERCIAL

## 2017-04-19 VITALS
HEART RATE: 88 BPM | DIASTOLIC BLOOD PRESSURE: 76 MMHG | BODY MASS INDEX: 33.38 KG/M2 | SYSTOLIC BLOOD PRESSURE: 122 MMHG | HEIGHT: 68 IN | WEIGHT: 220.25 LBS

## 2017-04-19 VITALS — WEIGHT: 219.56 LBS | HEIGHT: 68 IN | BODY MASS INDEX: 33.28 KG/M2

## 2017-04-19 DIAGNOSIS — I15.2 HYPERTENSION ASSOCIATED WITH DIABETES: ICD-10-CM

## 2017-04-19 DIAGNOSIS — I27.20 PULMONARY HTN: ICD-10-CM

## 2017-04-19 DIAGNOSIS — E11.59 HYPERTENSION ASSOCIATED WITH DIABETES: ICD-10-CM

## 2017-04-19 DIAGNOSIS — I50.40: ICD-10-CM

## 2017-04-19 DIAGNOSIS — I44.7 LBBB (LEFT BUNDLE BRANCH BLOCK): ICD-10-CM

## 2017-04-19 DIAGNOSIS — Z79.4 TYPE 2 DIABETES MELLITUS WITH STAGE 3 CHRONIC KIDNEY DISEASE, WITH LONG-TERM CURRENT USE OF INSULIN: ICD-10-CM

## 2017-04-19 DIAGNOSIS — R60.0 PERIPHERAL EDEMA: ICD-10-CM

## 2017-04-19 DIAGNOSIS — I50.43 ACUTE ON CHRONIC COMBINED SYSTOLIC AND DIASTOLIC CONGESTIVE HEART FAILURE: Primary | ICD-10-CM

## 2017-04-19 DIAGNOSIS — I50.42 CHRONIC COMBINED SYSTOLIC AND DIASTOLIC HEART FAILURE: Primary | Chronic | ICD-10-CM

## 2017-04-19 DIAGNOSIS — Z93.3 COLOSTOMY IN PLACE: ICD-10-CM

## 2017-04-19 DIAGNOSIS — I50.9 CHF (CONGESTIVE HEART FAILURE): ICD-10-CM

## 2017-04-19 DIAGNOSIS — E11.29 TYPE 2 DIABETES MELLITUS WITH RENAL MANIFESTATIONS NOT AT GOAL: ICD-10-CM

## 2017-04-19 DIAGNOSIS — I25.10 CAD, MULTIPLE VESSEL: Chronic | ICD-10-CM

## 2017-04-19 DIAGNOSIS — N18.30 TYPE 2 DIABETES MELLITUS WITH STAGE 3 CHRONIC KIDNEY DISEASE, WITH LONG-TERM CURRENT USE OF INSULIN: ICD-10-CM

## 2017-04-19 DIAGNOSIS — Z95.810 BIVENTRICULAR ICD (IMPLANTABLE CARDIOVERTER-DEFIBRILLATOR) IN PLACE: Chronic | ICD-10-CM

## 2017-04-19 DIAGNOSIS — E11.22 TYPE 2 DIABETES MELLITUS WITH STAGE 3 CHRONIC KIDNEY DISEASE, WITH LONG-TERM CURRENT USE OF INSULIN: ICD-10-CM

## 2017-04-19 DIAGNOSIS — E11.29 TYPE 2 DIABETES MELLITUS WITH RENAL MANIFESTATIONS NOT AT GOAL: Primary | ICD-10-CM

## 2017-04-19 DIAGNOSIS — G47.33 OSA ON CPAP: ICD-10-CM

## 2017-04-19 LAB
ALBUMIN SERPL BCP-MCNC: 2.6 G/DL
ALP SERPL-CCNC: 108 U/L
ALT SERPL W/O P-5'-P-CCNC: 14 U/L
ANION GAP SERPL CALC-SCNC: 8 MMOL/L
AST SERPL-CCNC: 33 U/L
BASOPHILS # BLD AUTO: 0.02 K/UL
BASOPHILS NFR BLD: 0.4 %
BILIRUB SERPL-MCNC: 1.3 MG/DL
BNP SERPL-MCNC: 1947 PG/ML
BUN SERPL-MCNC: 35 MG/DL
CALCIUM SERPL-MCNC: 8.9 MG/DL
CHLORIDE SERPL-SCNC: 106 MMOL/L
CO2 SERPL-SCNC: 27 MMOL/L
CREAT SERPL-MCNC: 1.6 MG/DL
DIFFERENTIAL METHOD: ABNORMAL
EOSINOPHIL # BLD AUTO: 0.2 K/UL
EOSINOPHIL NFR BLD: 4 %
ERYTHROCYTE [DISTWIDTH] IN BLOOD BY AUTOMATED COUNT: 17.5 %
EST. GFR  (AFRICAN AMERICAN): 52 ML/MIN/1.73 M^2
EST. GFR  (NON AFRICAN AMERICAN): 45 ML/MIN/1.73 M^2
GLUCOSE SERPL-MCNC: 157 MG/DL
HCT VFR BLD AUTO: 40.8 %
HGB BLD-MCNC: 13.6 G/DL
INR PPP: 1.1
LYMPHOCYTES # BLD AUTO: 1.3 K/UL
LYMPHOCYTES NFR BLD: 25 %
MCH RBC QN AUTO: 28.5 PG
MCHC RBC AUTO-ENTMCNC: 33.3 %
MCV RBC AUTO: 86 FL
MONOCYTES # BLD AUTO: 0.5 K/UL
MONOCYTES NFR BLD: 10.2 %
NEUTROPHILS # BLD AUTO: 3.1 K/UL
NEUTROPHILS NFR BLD: 60.4 %
PLATELET # BLD AUTO: 121 K/UL
PMV BLD AUTO: ABNORMAL FL
POTASSIUM SERPL-SCNC: 3.7 MMOL/L
PROT SERPL-MCNC: 7.6 G/DL
PROTHROMBIN TIME: 11.9 SEC
RBC # BLD AUTO: 4.77 M/UL
SODIUM SERPL-SCNC: 141 MMOL/L
TROPONIN I SERPL DL<=0.01 NG/ML-MCNC: 0.04 NG/ML
WBC # BLD AUTO: 5.19 K/UL

## 2017-04-19 PROCEDURE — 84484 ASSAY OF TROPONIN QUANT: CPT

## 2017-04-19 PROCEDURE — 3066F NEPHROPATHY DOC TX: CPT | Mod: S$GLB,,, | Performed by: PHYSICIAN ASSISTANT

## 2017-04-19 PROCEDURE — 99999 PR PBB SHADOW E&M-EST. PATIENT-LVL III: CPT | Mod: PBBFAC,,, | Performed by: DIETITIAN, REGISTERED

## 2017-04-19 PROCEDURE — 85025 COMPLETE CBC W/AUTO DIFF WBC: CPT

## 2017-04-19 PROCEDURE — 85610 PROTHROMBIN TIME: CPT

## 2017-04-19 PROCEDURE — 80053 COMPREHEN METABOLIC PANEL: CPT

## 2017-04-19 PROCEDURE — 96372 THER/PROPH/DIAG INJ SC/IM: CPT

## 2017-04-19 PROCEDURE — 11000001 HC ACUTE MED/SURG PRIVATE ROOM

## 2017-04-19 PROCEDURE — G0108 DIAB MANAGE TRN  PER INDIV: HCPCS | Mod: S$GLB,,, | Performed by: DIETITIAN, REGISTERED

## 2017-04-19 PROCEDURE — 99214 OFFICE O/P EST MOD 30 MIN: CPT | Mod: S$GLB,,, | Performed by: PHYSICIAN ASSISTANT

## 2017-04-19 PROCEDURE — 3078F DIAST BP <80 MM HG: CPT | Mod: S$GLB,,, | Performed by: PHYSICIAN ASSISTANT

## 2017-04-19 PROCEDURE — 96374 THER/PROPH/DIAG INJ IV PUSH: CPT

## 2017-04-19 PROCEDURE — 1160F RVW MEDS BY RX/DR IN RCRD: CPT | Mod: S$GLB,,, | Performed by: PHYSICIAN ASSISTANT

## 2017-04-19 PROCEDURE — 63600175 PHARM REV CODE 636 W HCPCS: Performed by: EMERGENCY MEDICINE

## 2017-04-19 PROCEDURE — 83880 ASSAY OF NATRIURETIC PEPTIDE: CPT

## 2017-04-19 PROCEDURE — 3074F SYST BP LT 130 MM HG: CPT | Mod: S$GLB,,, | Performed by: PHYSICIAN ASSISTANT

## 2017-04-19 PROCEDURE — 25000003 PHARM REV CODE 250: Performed by: EMERGENCY MEDICINE

## 2017-04-19 PROCEDURE — 99285 EMERGENCY DEPT VISIT HI MDM: CPT | Mod: 25

## 2017-04-19 PROCEDURE — 3044F HG A1C LEVEL LT 7.0%: CPT | Mod: S$GLB,,, | Performed by: PHYSICIAN ASSISTANT

## 2017-04-19 PROCEDURE — 93010 ELECTROCARDIOGRAM REPORT: CPT | Mod: ,,, | Performed by: INTERNAL MEDICINE

## 2017-04-19 PROCEDURE — 93005 ELECTROCARDIOGRAM TRACING: CPT

## 2017-04-19 PROCEDURE — 99999 PR PBB SHADOW E&M-EST. PATIENT-LVL III: CPT | Mod: PBBFAC,,, | Performed by: PHYSICIAN ASSISTANT

## 2017-04-19 PROCEDURE — 96376 TX/PRO/DX INJ SAME DRUG ADON: CPT

## 2017-04-19 RX ORDER — FUROSEMIDE 10 MG/ML
80 INJECTION INTRAMUSCULAR; INTRAVENOUS
Status: COMPLETED | OUTPATIENT
Start: 2017-04-19 | End: 2017-04-19

## 2017-04-19 RX ADMIN — FUROSEMIDE 80 MG: 10 INJECTION, SOLUTION INTRAMUSCULAR; INTRAVENOUS at 10:04

## 2017-04-19 RX ADMIN — NITROGLYCERIN 1 INCH: 20 OINTMENT TOPICAL at 10:04

## 2017-04-19 NOTE — IP AVS SNAPSHOT
[address] [place] [address]           Patient Discharge Instructions   Our goal is to set you up for success. This packet includes information on your condition, medications, and your home care.  It will help you care for yourself to prevent having to return to the hospital.     Please ask your nurse if you have any questions.      There are many details to remember when preparing to leave the hospital. Here is what you will need to do:    1. Take your medicine. If you are prescribed medications, review your Medication List on the following pages. You may have new medications to  at the pharmacy and others that you'll need to stop taking. Review the instructions for how and when to take your medications. Talk with your doctor or nurses if you are unsure of what to do.     2. Go to your follow-up appointments. Specific follow-up information is listed in the following pages. Your may be contacted by a nurse or clinical provider about future appointments. Be sure we have all of the phone numbers to reach you. Please contact your provider's office if you are unable to make an appointment.     3. Watch for warning signs. Your doctor or nurse will give you detailed warning signs to watch for and when to call for assistance. These instructions may also include educational information about your condition. If you experience any of warning signs to your health, call your doctor.               ** Verify the list of medication(s) below is accurate and up to date. Carry this with you in case of emergency. If your medications have changed, please notify your healthcare provider.             Medication List      CONTINUE taking these medications        Additional Info                      allopurinol 100 MG tablet   Commonly known as:  ZYLOPRIM   Quantity:  90 tablet   Refills:  0    Last time this was given:  100 mg on 4/22/2017  8:59 AM   Instructions:  TAKE 1 TABLET  (100 MG TOTAL) BY MOUTH ONCE DAILY.     Begin Date    AM    Noon    PM    Bedtime       aspirin 81 MG EC tablet   Commonly known as:  ECOTRIN   Refills:  0   Dose:  81 mg    Last time this was given:  81 mg on 4/22/2017  8:57 AM   Instructions:  Take 81 mg by mouth once daily.     Begin Date    AM    Noon    PM    Bedtime       * BD INSULIN SYRINGE ULTRA-FINE 0.5 mL 31 gauge x 5/16 Syrg   Quantity:  100 each   Refills:  3   Generic drug:  insulin syringe-needle U-100    Instructions:  USE AS DIRECTED TO INJECT INSULIN TWO TIMES DAILY     Begin Date    AM    Noon    PM    Bedtime       * BD INSULIN SYRINGE ULTRA-FINE 0.5 mL 31 gauge x 5/16 Syrg   Quantity:  100 each   Refills:  1   Generic drug:  insulin syringe-needle U-100    Instructions:  USE AS DIRECTED TO INJECT INSULIN TWO TIMES DAILY     Begin Date    AM    Noon    PM    Bedtime       bumetanide 2 MG tablet   Commonly known as:  BUMEX   Quantity:  60 tablet   Refills:  8    Instructions:  TAKE 1 TABLET (2 MG TOTAL) BY MOUTH 2 (TWO) TIMES DAILY.     Begin Date    AM    Noon    PM    Bedtime       carvedilol 25 MG tablet   Commonly known as:  COREG   Refills:  0   Dose:  12.5 mg    Last time this was given:  12.5 mg on 4/22/2017  8:57 AM   Instructions:  Take 12.5 mg by mouth 2 (two) times daily with meals.     Begin Date    AM    Noon    PM    Bedtime       GERITOL ORAL   Refills:  0    Instructions:  Take by mouth.     Begin Date    AM    Noon    PM    Bedtime       guaifenesin 600 mg 12 hr tablet   Commonly known as:  MUCINEX   Refills:  0   Dose:  1200 mg    Instructions:  Take 1,200 mg by mouth 2 (two) times daily as needed.     Begin Date    AM    Noon    PM    Bedtime       insulin glargine 100 unit/mL injection   Commonly known as:  LANTUS   Quantity:  30 mL   Refills:  11    Instructions:  50 units bid     Begin Date    AM    Noon    PM    Bedtime       lisinopril 5 MG tablet   Commonly known as:  PRINIVIL,ZESTRIL   Quantity:  90 tablet   Refills:  3  "  Dose:  5 mg    Last time this was given:  5 mg on 4/22/2017  8:57 AM   Instructions:  Take 1 tablet (5 mg total) by mouth once daily.     Begin Date    AM    Noon    PM    Bedtime       pantoprazole 40 MG tablet   Commonly known as:  PROTONIX   Quantity:  30 tablet   Refills:  4    Last time this was given:  40 mg on 4/22/2017  8:57 AM   Instructions:  TAKE 1 TABLET BY MOUTH EVERY DAY FOR ACID REFLUX     Begin Date    AM    Noon    PM    Bedtime       pen needle, diabetic 32 gauge x 5/32" Ndle   Commonly known as:  BD ULTRA-FINE JOSLYN PEN NEEDLES   Quantity:  100 each   Refills:  11   Dose:  1 each    Instructions:  1 each by Misc.(Non-Drug; Combo Route) route 4 (four) times daily.     Begin Date    AM    Noon    PM    Bedtime       potassium chloride SA 10 MEQ tablet   Commonly known as:  K-DUR,KLOR-CON   Quantity:  30 tablet   Refills:  6    Last time this was given:  20 mEq on 4/22/2017  8:57 AM   Instructions:  TAKE 1 TABLET BY MOUTH EVERY DAY     Begin Date    AM    Noon    PM    Bedtime       simvastatin 10 MG tablet   Commonly known as:  ZOCOR   Quantity:  30 tablet   Refills:  8    Last time this was given:  10 mg on 4/21/2017  9:00 PM   Instructions:  TAKE 1 TABLET BY MOUTH EVERY EVENING FOR CHOLESTEROL     Begin Date    AM    Noon    PM    Bedtime       * Notice:  This list has 2 medication(s) that are the same as other medications prescribed for you. Read the directions carefully, and ask your doctor or other care provider to review them with you.               Please bring to all follow up appointments:    1. A copy of your discharge instructions.  2. All medicines you are currently taking in their original bottles.  3. Identification and insurance card.    Please arrive 15 minutes ahead of scheduled appointment time.    Please call 24 hours in advance if you must reschedule your appointment and/or time.        Your Scheduled Appointments     Apr 24, 2017  8:00 AM CDT   Established Patient Visit with LOKI" Max Araiza MD   [facility] - Ophthalmology ([facility])    [address] [place] [address]   [phone]            May 15, 2017  8:00 AM CDT   Remote Interrogation with HOME MONITOR DEVICE CHECK   [facility] - Arrhythmia Procedures ([facility])    [address]  [place] [address]   [phone]            May 15, 2017  9:00 AM CDT   Established Patient Visit with Sandra Estevez MD   [facility] - Internal Medicine ([facility])    [address] [place] [address]   [phone]            Jul 19, 2017  3:00 PM CDT   Diabetes Ed Follow-Up with Otilia Prado RD, CDE   [facility] - Diabetes Management ([facility])    [address] [place] [address]   [phone]              Follow-up Information     Follow up with Sandra Estevez MD In 1 week.    Specialty:  Family Medicine    Contact information:    [address] [place] [address]  [phone]          Discharge Instructions     Future Orders    Activity as tolerated     Call MD for:  difficulty breathing or increased cough     Call MD for:  increased confusion or weakness     Call MD for:  persistent dizziness, light-headedness, or visual disturbances     Call MD for:  persistent nausea and vomiting or diarrhea     Call MD for:  redness, tenderness, or signs of infection (pain, swelling, redness, odor or green/yellow discharge around incision site)     Call MD for:  severe persistent headache     Call MD for:  severe uncontrolled pain     Call MD for:  temperature >100.4     Call MD for:  worsening rash     Diet general     Questions:    Total calories:      Fat restriction, if any:      Protein restriction, if any:      Na restriction, if any:      Fluid restriction:      Additional restrictions:          Primary Diagnosis     Your primary diagnosis was:  Heart Failure      Admission Information     Date & Time Provider Department CSN    4/19/2017  8:35 PM Federico Restrepo MD [facility]  "[id number]      Care Providers     Provider Role Specialty Primary office phone    Federico Restrepo MD Attending Provider Rheumatology [phone]    Luther Calvillo MD Consulting Physician  Cardiology [phone]    Gui Hannon MD Consulting Physician  Cardiology  [phone]    Federico Restrepo MD Team Attending  Rheumatology [phone]      Your Vitals Were     BP Pulse Temp Resp Height Weight    128/74 (BP Location: Left arm, Patient Position: Lying, BP Method: Automatic) 78 97.9 °F (36.6 °C) (Oral) 18 5' 8" (1.727 m) 96 kg (211 lb 10.3 oz)    SpO2 BMI             98% 32.18 kg/m2         Recent Lab Values        9/4/2014 2/13/2015 6/12/2015 10/16/2015 12/16/2015 4/14/2016 11/17/2016 4/20/2017     11:16 AM  2:36 PM  2:57 PM  4:37 PM  4:16 AM  8:23 PM 11:02 AM  4:00 AM    A1C 7.0 (H) 5.8 7.4 (H) 8.1 (H) 8.7 (H) 5.8 6.7 (H) 6.4 (H)    Comment for A1C at 11:02 AM on 11/17/2016:  According to ADA guidelines, hemoglobin A1C <7.0% represents  optimal control in non-pregnant diabetic patients.  Different  metrics may apply to specific populations.   Standards of Medical Care in Diabetes - 2016.  For the purpose of screening for the presence of diabetes:  <5.7%     Consistent with the absence of diabetes  5.7-6.4%  Consistent with increasing risk for diabetes   (prediabetes)  >or=6.5%  Consistent with diabetes  Currently no consensus exists for use of hemoglobin A1C  for diagnosis of diabetes for children.      Comment for A1C at  4:00 AM on 4/20/2017:  According to ADA guidelines, hemoglobin A1C <7.0% represents  optimal control in non-pregnant diabetic patients.  Different  metrics may apply to specific populations.   Standards of Medical Care in Diabetes - 2016.  For the purpose of screening for the presence of diabetes:  <5.7%     Consistent with the absence of diabetes  5.7-6.4%  Consistent with increasing risk for diabetes   (prediabetes)  >or=6.5%  Consistent with diabetes  Currently no consensus exists for use of " hemoglobin A1C  for diagnosis of diabetes for children.        Allergies as of 4/22/2017     No Known Allergies      [facility]     [facility] Nurse Care Line - 24/7 Assistance  Unless otherwise directed by your provider, please contact [facility], our nurse care line that is available for 24/7 assistance.     Registered nurses in the [facility] provide clinical advisement, health education, appointment booking, and other advisory services.  Call for this free service at [phone].        Advance Directives     An advance directive is a document which, in the event you are no longer able to make decisions for yourself, tells your healthcare team what kind of treatment you do or do not want to receive, or who you would like to make those decisions for you.  If you do not currently have an advance directive, [facility] encourages you to create one.  For more information call:  [phone]), [phone] ([phone]),  or log on to [email or web address].        Language Assistance Services     ATTENTION: Language assistance services are available, free of charge. Please call [phone].      ATENCIÓN: Si habla español, tiene a roman disposición servicios gratuitos de asistencia lingüística. Llame al [phone].     [facility] Ý: N?u b?n nói Ti?ng Vi?t, có các d?ch v? h? tr? ngôn ng? mi?n phí dành cho b?n. G?i s? [phone].        Heart Failure Education       Heart Failure: Being Active  You have a condition called heart failure. Being active doesnt mean that you have to wear yourself out. Even a little movement each day helps to strengthen your heart. If you cant get out to exercise, you can do simple stretching and strengthening exercises at home. These are good ways to keep you well-conditioned and prevent you and your heart from becoming excessively weak.    Ideas to get you started  · Add a little movement to things you do now. Walk to mail letters. Park your  car at the far end of the parking lot and walk to the store. Walk up a flight of stairs instead of taking the elevator.  · Choose activities you enjoy. You might walk, swim, or ride an exercise bike. Things like gardening and washing the car count, too. Other possibilities include: washing dishes, walking the dog, walking around the mall, and doing aerobic activities with friends.  · Join a group exercise program at a [facility], a senior center, or a community center. Or look into a hospital cardiac rehabilitation program. Ask your doctor if you qualify.  Tips to keep you going  · Get up and get dressed each day. Go to a coffee shop and read a newspaper or go somewhere that you'll be in the presence of other active people. Youll feel more like being active.  · Make a plan. Choose one or more activities that you enjoy and that you can easily do. Then plan to do at least one each day. You might write your plan on a calendar.  · Go with a friend or a group if you like company. This can help you feel supported and stay motivated, too.  · Plan social events that you enjoy. This will keep you mentally engaged as well as physically motivated to do things you find pleasure in.  For your safety  · Talk with your healthcare provider before starting an exercise program.  · Exercise indoors when its too hot or too cold outside, or when the air quality is poor. Try walking at a shopping mall.  · Wear socks and sturdy shoes to maintain your balance and prevent falls.  · Start slowly. Do a few minutes several times a day at first. Increase your time and speed little by little.  · Stop and rest whenever you feel tired or get short of breath.  · Dont push yourself on days when you dont feel well.  Date Last Reviewed: 3/20/2016  © 8420-2738 [employer]. [address] [place] [address], [place] [address]. All rights reserved. This information is not intended as a substitute for professional medical care. Always follow your  healthcare professional's instructions.              Heart Failure: Evaluating Your Heart  You have a condition called heart failure. To evaluate your condition, your doctor will examine you, ask questions, and do some tests. Along with looking for signs of heart failure, the doctor looks for any other health problems that may have led to heart failure. The results of your evaluation will help your doctor form a treatment plan.  Health history and physical exam  Your visit will start with a health history. Tell the doctor about any symptoms youve noticed and about all medicines you take. Then youll have a physical exam. This includes listening to your heartbeat and breathing. Youll also be checked for swelling (edema) in your legs and neck. When you have fluid buildup or fluid in the lungs, it may be called congestive heart failure.  Diagnosing heart failure     During an echocardiogram, sound waves bounce off the heart. These are converted into a picture on the screen.   The following may be done to help your doctor form a diagnosis:  · X-rays show the size and shape of your heart. These pictures can also show fluid in your lungs.  · An electrocardiogram (ECG or EKG) shows the pattern of your heartbeat. Small pads (electrodes) are placed on your chest, arms, and legs. Wires connect the pads to the ECG machine, which records your hearts electrical signals. This can give the doctor information about heart function.  · An echocardiogram uses ultrasound waves to show the structure and movement of your heart muscle. This shows how well the heart pumps. It also shows the thickness of the heart walls, and if the heart is enlarged. It is one of the most useful, non-invasive tests as it provides information about the heart's general function. This helps your doctor make treatment decisions.  · Lab tests evaluate small amounts of blood or urine for signs of problems. A BNP lab test can help diagnose and evaluate heart  failure. BNP stands for B-type natriuretic peptide. The ventricles secrete more BNP when heart failure worsens. Lab tests can also provide information about metabolic dysfunction or heart dysfunction.  Your treatment plan  Based on the results of your evaluation and tests, your doctor will develop a treatment plan. This plan is designed to relieve some of your heart failure symptoms and help make you more comfortable. Your treatment plan may include:  · Medicine to help your heart work better and improve your quality of life  · Changes in what you eat and drink to help prevent fluid from backing up in your body  · Daily monitoring of your weight and heart failure symptoms to see how well your treatment plan is working  · Exercise to help you stay healthy  · Help with quitting smoking  · Emotional and psychological support to help adjust to the changes  · Referrals to other specialists to make sure you are being treated comprehensively  Date Last Reviewed: 3/21/2016  © 7315-1070 [employer]. [address] [place] [address]. All rights reserved. This information is not intended as a substitute for professional medical care. Always follow your healthcare professional's instructions.              Heart Failure: Making Changes to Your Diet  You have a condition called heart failure. When you have heart failure, excess fluid is more likely to build up in your body because your heart isn't working well. This makes the heart work harder to pump blood. Fluid buildup causes symptoms such as shortness of breath and swelling (edema). This is often referred to as congestive heart failure or CHF. Controlling the amount of salt (sodium) you eat may help stop fluid from building up. Your doctor may also tell you to reduce the amount of fluid you drink.  Reading food labels    Your healthcare provider will tell you how much sodium you can eat each day. Read food labels to keep track. Keep in mind that certain  foods are high in salt. These include canned, frozen, and processed foods. Check the amount of sodium in each serving. Watch out for high-sodium ingredients. These include MSG (monosodium glutamate), baking soda, and sodium phosphate.   Eating less salt  Give yourself time to get used to eating less salt. It may take a little while. Here are some tips to help:  · Take the saltshaker off the table. Replace it with salt-free herb mixes and spices.  · Eat fresh or plain frozen vegetables. These have much less salt than canned vegetables.  · Choose low-sodium snacks like sodium-free pretzels, crackers, or air-popped popcorn.  · Dont add salt to your food when youre cooking. Instead, season your foods with pepper, lemon, garlic, or onion.  · When you eat out, ask that your food be cooked without added salt.  · Avoid eating fried foods as these often have a great deal of salt.  If youre told to limit fluids  You may need to limit how much fluid you have to help prevent swelling. This includes anything that is liquid at room temperature, such as ice cream and soup. If your doctor tells you to limit fluid, try these tips:  · Measure drinks in a measuring cup before you drink them. This will help you meet daily goals.  · Chill drinks to make them more refreshing.  · Suck on frozen lemon wedges to quench thirst.  · Only drink when youre thirsty.  · Chew sugarless gum or suck on hard candy to keep your mouth moist.  · Weigh yourself daily to know if your body's fluid content is rising.  My sodium goal  Your healthcare provider may give you a sodium goal to meet each day. This includes sodium found in food as well as salt that you add. My goal is to eat no more than ___________ mg of sodium per day.     When to call your doctor  Call your doctor right away if you have any symptoms of worsening heart failure. These can include:  · Sudden weight gain  · Increased swelling of your legs or ankles  · Trouble breathing when  youre resting or at night  · Increase in the number of pillows you have to sleep on  · Chest pain, pressure, discomfort, or pain in the jaw, neck, or back   Date Last Reviewed: 3/21/2016  © 5413-5718 [employer]. [address] [place] [address]. All rights reserved. This information is not intended as a substitute for professional medical care. Always follow your healthcare professional's instructions.              Heart Failure: Medicines to Help Your Heart    You have a condition called heart failure (also known as congestive heart failure, or CHF). Your doctor will likely prescribe medicines for heart failure and any underlying health problems you have. Most heart failure patients take one or more types of medicinen. Your healthcare provider will work to find the combination of medicines that works best for you.  Heart failure medicines  Here are the most common heart failure medicines:  · ACE inhibitors lower blood pressure and decrease strain on the heart. This makes it easier for the heart to pump. Angiotensin receptor blockers have similar effects. These are prescribed for some patients instead of ACE inhibitors.  · Beta-blockers relieve stress on the heart. They also improve symptoms. They may also improve the heart's pumping action over time.  · Diuretics (also called water pills) help rid your body of excess water. This can help rid your body of swelling (edema). Having less fluid to pump means your heart doesnt have to work as hard. Some diuretics make your body lose a mineral called potassium. Your doctor will tell you if you need to take supplements or eat more foods high in potassium.  · Digoxin helps your heart pump with more strength. This helps your heart pump more blood with each beat. So, more oxygen-rich blood travels to the rest of the body.  · Aldosterone antagonists help alter hormones and decrease strain on the heart.  · Hydralazine and nitrates are two separate  medicines used together to treat heart failure. They may come in one combination pill. They lower blood pressure and decrease how hard the heart has to pump.  Medicines for related conditions  Controlling other heart problems helps keep heart failure under control, too. Depending on other heart problems you have, medicines may be prescribed to:  · Lower blood pressure (antihypertensives).  · Lower cholesterol levels (statins).  · Prevent blood clots (anticoagulants or aspirin).  · Keep the heartbeat steady (antiarrhythmics).  Date Last Reviewed: 3/5/2016  © 9300-1162 [employer]. [address] [place] [address]. All rights reserved. This information is not intended as a substitute for professional medical care. Always follow your healthcare professional's instructions.              Heart Failure: Procedures That May Help    The heart is a muscle that pumps oxygen-rich blood to all parts of the body. When you have heart failure, the heart is not able to pump as well as it should. Blood and fluid may back up into the lungs (congestive heart failure), and some parts of the body dont get enough oxygen-rich blood to work normally. These problems lead to the symptoms of heart failure.     Certain procedures may help the heart pump better in some cases of heart failure. Some procedures are done to treat health problems that may have caused the heart failure such as coronary artery disease or heart rhythm problems. For more serious heart failure, other options are available.  Treating artery and valve problems  If you have coronary artery disease or valve disease, procedures may be done to improve blood flow. This helps the heart pump better, which can improve heart failure symptoms. First, your doctor may do a cardiac catheterization to help detect clogged blood vessels or valve damage. During this procedure, a  thin tube (catheter) in inserted into a blood vessel and guided to the heart. There a  dye is injected and a special type of X-ray (angiogram) is taken of the blood vessels. Procedures to open a blocked artery or fix damaged valves can also be done using catheterization.  · Angioplasty uses a balloon-tipped instrument at the end of the catheter. The balloon is inflated to widen the narrowed artery. In many cases, a stent is expanded to further support the narrowed artery. A stent is a metal mesh tube.  · Valve surgery repairs or replacement of faulty valves can also be done during catheterization so blood can flow properly through the chambers of the heart.  Bypass surgery is another option to help treat blocked arteries. It uses a healthy blood vessel from elsewhere in the body. The healthy blood vessel is attached above and below the blocked area so that blood can flow around the blocked artery.  Treating heart rhythm problems  A device may be placed in the chest to help a weak heart maintain a healthy, heartbeat so the heart can pump more effectively:  · Pacemaker. A pacemaker is an implanted device that regulates your heartbeat electronically. It monitors your heart's rhythm and generates a painless electric impulse that helps the heart beat in a regular rhythm. A pacemaker is programmed to meet your specific heart rhythm needs.  · Biventricular pacing/cardiac resynchronization therapy. A type of pacemaker that paces both pumping chambers of the heart at the same time to coordinate contractions and to improve the heart's function. Some people with heart failure are candidates for this therapy.  · Implantable cardioverter defibrillator. A device similar to a pacemaker that senses when the heart is beating too fast and delivers an electrical shock to convert the fast rhythm to a normal rhythm. This can be a life saving device.  In severe cases  In more serious cases of heart failure when other treatments no longer work, other options may include:  · Ventricular assist devices (VADs). These are  mechanical devices used to take over the pumping function for one or both of the heart's ventricles, or pumping chambers. A VAD may be necessary when heart failure progresses to the point that medicines and other treatments no longer help. In some cases, a VAD may be used as a bridge to transplant.  · Heart transplant. This is replacing the diseased heart with a healthy one from a donor. This is an option for a few people who are very sick. A heart transplant is very serious and not an option for all patients. Your doctor can tell you more.  Date Last Reviewed: 3/20/2016  © 0931-1288 [employer]. [address] [place] [address], [place] [address]. All rights reserved. This information is not intended as a substitute for professional medical care. Always follow your healthcare professional's instructions.              Heart Failure: Tracking Your Weight  You have a condition called heart failure. When you have heart failure, a sudden weight gain or a steady rise in weight is a warning sign that your body is retaining too much water and salt. This could mean your heart failure is getting worse. If left untreated, it can cause problems for your lungs and result in shortness of breath. Weighing yourself each day is the best way to know if youre retaining water. If your weight goes up quickly, call your doctor. You will be given instructions on how to get rid of the excess water. You will likely need medicines and to avoid salt. This will help your heart work better.  Call your doctor if you gain more than 2 pounds in 1 day, more than 5 pounds in 1 week, or whatever weight gain you were told to report by your doctor. This is often a sign of worsening heart failure and needs to be evaluated and treated. Your doctor will tell you what to do next.   Tips for weighing yourself    · Weigh yourself at the same time each morning, wearing the same clothes. Weigh yourself after urinating and before eating.  · Use the same  scale each day. Make sure the numbers are easy to read. Put the scale on a flat, hard surface -- not on a rug or carpet.  · Do not stop weighing yourself. If you forget one day, weigh again the next morning.  How to use your weight chart  · Keep your weight chart near the scale. Write your weight on the chart as soon as you get off the scale.  · Fill in the month and the start date on the chart. Then write down your weight each day. Your chart will look like this:    · If you miss a day, leave the space blank. Weigh yourself the next day and write your weight in the next space.  · Take your weight chart with you when you go to see your doctor.  Date Last Reviewed: 3/20/2016  © 7424-3399 ????. [address] [place] [address], [place] [address]. All rights reserved. This information is not intended as a substitute for professional medical care. Always follow your healthcare professional's instructions.              Heart Failure: Warning Signs of a Flare-Up  You have a condition called heart failure. Once you have heart failure, flare-ups can happen. Below are signs that can mean your heart failure is getting worse. If you notice any of these warning signs, call your healthcare provider.  Swelling    · Your feet, ankles, or lower legs get puffier.  · You notice skin changes on your lower legs.  · Your shoes feel too tight.  · Your clothes are tighter in the waist.  · You have trouble getting rings on or off your fingers.  Shortness of breath  · You have to breathe harder even when youre doing your normal activities or when youre resting.  · You are short of breath walking up stairs or even short distances.  · You wake up at night short of breath or coughing.  · You need to use more pillows or sit up to sleep.  · You wake up tired or restless.  Other warning signs  · You feel weaker, dizzy, or more tired.  · You have chest pain or changes in your heartbeat.  · You have a cough that wont go away.  · You  cant remember things or dont feel like eating.  Tracking your weight  Gaining weight is often the first warning sign that heart failure is getting worse. Gaining even a few pounds can be a sign that your body is retaining excess water and salt. Weighing yourself each day in the morning after you urinate and before you eat, is the best way to know if you're retaining water. Get a scale that is easy to read and make sure you wear the same clothes and use the same scale every time you weigh. Your healthcare provider will show you how to track your weight. Call your doctor if you gain more than 2 pounds in 1 day, 5 pounds in 1 week, or whatever weight gain you were told to report by your doctor. This is often a sign of worsening heart failure and needs to be evaluated and treated before it compromises your breathing. Your doctor will tell you what to do next.    Date Last Reviewed: 3/15/2016  © 2080-8028 [employer] [place]. [address] [place] [address], [place] [address]. All rights reserved. This information is not intended as a substitute for professional medical care. Always follow your healthcare professional's instructions.              Chronic [place] Disease Education             Diabetes Discharge Instructions                                    [facility] complies with applicable Federal civil rights laws and does not discriminate on the basis of race, color, national origin, age, disability, or sex.

## 2017-04-19 NOTE — PROGRESS NOTES
"Subjective:    Patient ID:  Yan Choudhury is a 61 y.o. male who presents for follow-up of Hospital Follow Up      HPI  Mr. Choudhury is a 61 year old male patient with a PMHx of CAD/ICM (s/p [facility] 2009, diffuse CAD), DCM, LBBB, HTN, CRI, DM, dyslipidemia, and obesity who presents today for follow-up. Patient previously seen by me last week and was sent to ED for decompensated CHF. He ended up going to [facility], was given IV Lasix and released. He returns today and states he feels ok however he is dyspneic even when speaking. His weight has also increased by 16 pounds. Patient denies any PND or orthopnea but wife reports he has nocturnal "gagging" and "coughing" while he is lying flat. Lower extremity edema is also increased. Patient reports compliance with his medications. He traveled out of town for the Easter holiday and indulged in crawfish, ochoa, and fried fish.     Review of Systems   Constitution: Positive for weight gain. Negative for chills, decreased appetite, fever, weakness and malaise/fatigue.   HENT: Negative for congestion, headaches, hoarse voice and sore throat.    Eyes: Negative for blurred vision and discharge.   Cardiovascular: Positive for dyspnea on exertion and leg swelling. Negative for chest pain, claudication, cyanosis, irregular heartbeat, near-syncope, orthopnea, palpitations and paroxysmal nocturnal dyspnea.   Respiratory: Positive for cough, shortness of breath and wheezing. Negative for hemoptysis, snoring and sputum production.    Endocrine: Negative for cold intolerance and heat intolerance.   Hematologic/Lymphatic: Negative for bleeding problem. Does not bruise/bleed easily.   Skin: Negative for rash.   Musculoskeletal: Negative for arthritis, back pain, joint pain, joint swelling, muscle cramps, muscle weakness and myalgias.   Gastrointestinal: Negative for abdominal pain, constipation, diarrhea, heartburn, melena and nausea.   Genitourinary: Negative for hematuria.   Neurological: " "Negative for dizziness, focal weakness, light-headedness, loss of balance, numbness, paresthesias and seizures.   Psychiatric/Behavioral: Negative for memory loss. The patient does not have insomnia.    Allergic/Immunologic: Negative for hives.       /76  Pulse 88  Ht 5' 8" (1.727 m)  Wt 99.9 kg (220 lb 3.8 oz)  BMI 33.49 kg/m2    Objective:    Physical Exam   Constitutional: He is oriented to person, place, and time. He appears well-developed and well-nourished. No distress.   HENT:   Head: Normocephalic and atraumatic.   Eyes: Pupils are equal, round, and reactive to light. Right eye exhibits no discharge. Left eye exhibits no discharge.   Neck: Neck supple. JVD present. No tracheal deviation present. No thyromegaly present.   Cardiovascular: Normal rate, regular rhythm, S1 normal, S2 normal and normal heart sounds.  PMI is not displaced.  Exam reveals no gallop, no S3, no S4 and no friction rub.    No murmur heard.  Pulses:       Radial pulses are 2+ on the right side, and 2+ on the left side.   Pulmonary/Chest: Effort normal. No respiratory distress. He has no wheezes. He has rales (Rml, rll, lml, lll).   Abdominal: Soft. He exhibits distension. There is no tenderness. There is no rebound.   Colostomy present     Musculoskeletal: He exhibits edema (2-3+ bilateral pitting edema).   Neurological: He is alert and oriented to person, place, and time.   Skin: Skin is warm and dry. He is not diaphoretic. No erythema.   Psychiatric: He has a normal mood and affect. His behavior is normal. Thought content normal.   Nursing note and vitals reviewed.      2D Echo CONCLUSIONS     1 - Mild left atrial enlargement.     2 - Mild left ventricular enlargement.     3 - Eccentric hypertrophy.     4 - Moderately depressed left ventricular systolic function (EF 30-35%).     5 - Left ventricular diastolic dysfunction.     6 - Low normal right ventricular systolic function .     7 - Moderate aortic regurgitation.     8 - " Moderate tricuspid regurgitation.     9 - Pulmonary hypertension. The estimated PA systolic pressure is 59 mmHg.         Chemistry        Component Value Date/Time     04/10/2017 1432    K 4.2 04/10/2017 1432     04/10/2017 1432    CO2 31 (H) 04/10/2017 1432    BUN 30 (H) 04/10/2017 1432    CREATININE 2.2 (H) 04/10/2017 1432     04/10/2017 1432        Component Value Date/Time    CALCIUM 9.7 04/10/2017 1432    ALKPHOS 132 04/10/2017 1432    AST 53 (H) 04/10/2017 1432    ALT 25 04/10/2017 1432    BILITOT 1.2 (H) 04/10/2017 1432            Assessment:       1. Chronic combined systolic and diastolic heart failure    2. Pulmonary HTN    3. CAD, multiple vessel    4. CHF NYHA class II, unspecified failure chronicity, combined    5. Hypertension associated with diabetes    6. LBBB (left bundle branch block)    7. 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    8. Biventricular ICD (implantable cardioverter-defibrillator) in place       Patient who presents with decompensated CHF in part due to dietary non-compliance. He needs admittance and IV diuresis, may need inotropic therapy pending  response.   Plan:     -To [facility] ED for further evaluation and IV diuresis  -Dr. Toscano notified of pending arrival    Chart reviewed. Dr. Calvillo agrees with plan as outlined above.

## 2017-04-19 NOTE — MR AVS SNAPSHOT
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 E11.29 Hreb; Gordon pm.      [facility]     [facility] Care Line -  Assistance  Unless otherwise directed by your provider, please contact [facility], our nurse care line that is available for  assistance.     Registered nurses in the [facility] provide: appointment scheduling, clinical advisement, health education, and other advisory services.  Call: [phone] (toll free)               Medications           Message regarding Medications     Verify the changes and/or additions to your medication regime listed below are the same as discussed with your clinician today.  If any of these changes or additions are incorrect, please notify your healthcare provider.             Verify that the below list of medications is an accurate representation of the medications you are currently taking.  If none reported, the list may be blank. If incorrect, please contact your healthcare provider. Carry this list with you in case of emergency.           Current Medications   "   allopurinol (ZYLOPRIM) 100 MG tablet TAKE 1 TABLET (100 MG TOTAL) BY MOUTH ONCE DAILY.    aspirin (ECOTRIN) 81 MG EC tablet Take 81 mg by mouth once daily.    BD INSULIN SYRINGE ULTRA-FINE 1/2 mL 31 x 5/16" Syrg USE AS DIRECTED TO INJECT INSULIN TWO TIMES DAILY    BD INSULIN SYRINGE ULTRA-FINE 1/2 mL 31 x 5/16" Syrg USE AS DIRECTED TO INJECT INSULIN TWO TIMES DAILY    bumetanide (BUMEX) 2 MG tablet TAKE 1 TABLET (2 MG TOTAL) BY MOUTH 2 (TWO) TIMES DAILY.    carvedilol (COREG) 25 MG tablet Take 12.5 mg by mouth 2 (two) times daily with meals.    guaifenesin (MUCINEX) 600 mg 12 hr tablet Take 1,200 mg by mouth 2 (two) times daily as needed.     insulin glargine (LANTUS) 100 unit/mL injection 50 units bid    insulin needles, disposable, (BD ULTRA-FINE JOSLYN PEN NEEDLES) 32 x 5/32 " Ndle 1 each by Misc.(Non-Drug; Combo Route) route 4 (four) times daily.    IRON/VITAMIN B COMPLEX (GERITOL ORAL) Take by mouth.    pantoprazole (PROTONIX) 40 MG tablet TAKE 1 TABLET BY MOUTH EVERY DAY FOR ACID REFLUX    potassium chloride SA (K-DUR,KLOR-CON) 10 MEQ tablet TAKE 1 TABLET BY MOUTH EVERY DAY    simvastatin (ZOCOR) 10 MG tablet TAKE 1 TABLET BY MOUTH EVERY EVENING FOR CHOLESTEROL    lisinopril (PRINIVIL,ZESTRIL) 5 MG tablet Take 1 tablet (5 mg total) by mouth once daily.           Clinical Reference Information           Your Vitals Were     Height Weight BMI          5' 8" (1.727 m) 99.6 kg (219 lb 9.3 oz) 33.39 kg/m2        Allergies as of 4/19/2017     No Known Allergies      Immunizations Administered on Date of Encounter - 4/19/2017     None      Language Assistance Services     ATTENTION: Language assistance services are available, free of charge. Please call [phone].      ATENCIÓN: Si chrissiela sahara, tiene a roman disposición servicios gratuitos de asistencia lingüística. Llame al [phone].     CHÚ Ý: N?u b?n nói Ti?ng Vi?t, có các d?ch v? h? tr? mariano ng? mi?n phí dành cho b?n. G?i s? [phone].         " [facility] complies with applicable Federal civil rights laws and does not discriminate on the basis of race, color, national origin, age, disability, or sex.

## 2017-04-20 LAB
ANION GAP SERPL CALC-SCNC: 8 MMOL/L
BASOPHILS # BLD AUTO: 0.01 K/UL
BASOPHILS NFR BLD: 0.2 %
BUN SERPL-MCNC: 35 MG/DL
CALCIUM SERPL-MCNC: 8.7 MG/DL
CHLORIDE SERPL-SCNC: 105 MMOL/L
CHOLEST/HDLC SERPL: 2.9 {RATIO}
CO2 SERPL-SCNC: 28 MMOL/L
CREAT SERPL-MCNC: 1.5 MG/DL
DIFFERENTIAL METHOD: ABNORMAL
EOSINOPHIL # BLD AUTO: 0.2 K/UL
EOSINOPHIL NFR BLD: 3 %
ERYTHROCYTE [DISTWIDTH] IN BLOOD BY AUTOMATED COUNT: 17.6 %
EST. GFR  (AFRICAN AMERICAN): 56 ML/MIN/1.73 M^2
EST. GFR  (NON AFRICAN AMERICAN): 49 ML/MIN/1.73 M^2
GIANT PLATELETS BLD QL SMEAR: PRESENT
GLUCOSE SERPL-MCNC: 138 MG/DL
HCT VFR BLD AUTO: 38.2 %
HDL/CHOLESTEROL RATIO: 34.8 %
HDLC SERPL-MCNC: 112 MG/DL
HDLC SERPL-MCNC: 39 MG/DL
HGB BLD-MCNC: 12.7 G/DL
LDLC SERPL CALC-MCNC: 58.6 MG/DL
LYMPHOCYTES # BLD AUTO: 1.6 K/UL
LYMPHOCYTES NFR BLD: 27.2 %
MAGNESIUM SERPL-MCNC: 1.5 MG/DL
MAGNESIUM SERPL-MCNC: 1.5 MG/DL
MCH RBC QN AUTO: 28.4 PG
MCHC RBC AUTO-ENTMCNC: 33.2 %
MCV RBC AUTO: 86 FL
MONOCYTES # BLD AUTO: 0.5 K/UL
MONOCYTES NFR BLD: 8.2 %
NEUTROPHILS # BLD AUTO: 3.7 K/UL
NEUTROPHILS NFR BLD: 61.6 %
NONHDLC SERPL-MCNC: 73 MG/DL
PHOSPHATE SERPL-MCNC: 3.3 MG/DL
PHOSPHATE SERPL-MCNC: 3.5 MG/DL
PLATELET # BLD AUTO: 126 K/UL
PLATELET BLD QL SMEAR: ABNORMAL
PMV BLD AUTO: ABNORMAL FL
POTASSIUM SERPL-SCNC: 3.7 MMOL/L
RBC # BLD AUTO: 4.47 M/UL
SODIUM SERPL-SCNC: 141 MMOL/L
TRIGL SERPL-MCNC: 72 MG/DL
TROPONIN I SERPL DL<=0.01 NG/ML-MCNC: 0.04 NG/ML
TROPONIN I SERPL DL<=0.01 NG/ML-MCNC: 0.05 NG/ML
TROPONIN I SERPL DL<=0.01 NG/ML-MCNC: 0.07 NG/ML
WBC # BLD AUTO: 5.99 K/UL

## 2017-04-20 PROCEDURE — 25000003 PHARM REV CODE 250: Performed by: INTERNAL MEDICINE

## 2017-04-20 PROCEDURE — 84100 ASSAY OF PHOSPHORUS: CPT | Mod: 91

## 2017-04-20 PROCEDURE — 25000003 PHARM REV CODE 250: Performed by: EMERGENCY MEDICINE

## 2017-04-20 PROCEDURE — 84100 ASSAY OF PHOSPHORUS: CPT

## 2017-04-20 PROCEDURE — 21400001 HC TELEMETRY ROOM

## 2017-04-20 PROCEDURE — 99223 1ST HOSP IP/OBS HIGH 75: CPT | Mod: ,,, | Performed by: INTERNAL MEDICINE

## 2017-04-20 PROCEDURE — 63600175 PHARM REV CODE 636 W HCPCS: Performed by: INTERNAL MEDICINE

## 2017-04-20 PROCEDURE — 84484 ASSAY OF TROPONIN QUANT: CPT | Mod: 91

## 2017-04-20 PROCEDURE — 85025 COMPLETE CBC W/AUTO DIFF WBC: CPT

## 2017-04-20 PROCEDURE — 83735 ASSAY OF MAGNESIUM: CPT | Mod: 91

## 2017-04-20 PROCEDURE — 36415 COLL VENOUS BLD VENIPUNCTURE: CPT

## 2017-04-20 PROCEDURE — 83735 ASSAY OF MAGNESIUM: CPT

## 2017-04-20 PROCEDURE — 93010 ELECTROCARDIOGRAM REPORT: CPT | Mod: ,,, | Performed by: INTERNAL MEDICINE

## 2017-04-20 PROCEDURE — 80061 LIPID PANEL: CPT

## 2017-04-20 PROCEDURE — 83036 HEMOGLOBIN GLYCOSYLATED A1C: CPT

## 2017-04-20 PROCEDURE — 80048 BASIC METABOLIC PNL TOTAL CA: CPT

## 2017-04-20 RX ORDER — SODIUM CHLORIDE 9 MG/ML
3 INJECTION, SOLUTION INTRAMUSCULAR; INTRAVENOUS; SUBCUTANEOUS EVERY 8 HOURS
Status: DISCONTINUED | OUTPATIENT
Start: 2017-04-20 | End: 2017-04-22 | Stop reason: HOSPADM

## 2017-04-20 RX ORDER — SODIUM CHLORIDE 0.9 % (FLUSH) 0.9 %
3 SYRINGE (ML) INJECTION EVERY 8 HOURS
Status: CANCELLED | OUTPATIENT
Start: 2017-04-20

## 2017-04-20 RX ORDER — PANTOPRAZOLE SODIUM 40 MG/1
40 TABLET, DELAYED RELEASE ORAL DAILY
Status: DISCONTINUED | OUTPATIENT
Start: 2017-04-20 | End: 2017-04-22 | Stop reason: HOSPADM

## 2017-04-20 RX ORDER — IPRATROPIUM BROMIDE AND ALBUTEROL SULFATE 2.5; .5 MG/3ML; MG/3ML
3 SOLUTION RESPIRATORY (INHALATION) EVERY 4 HOURS PRN
Status: DISCONTINUED | OUTPATIENT
Start: 2017-04-20 | End: 2017-04-22 | Stop reason: HOSPADM

## 2017-04-20 RX ORDER — HYDRALAZINE HYDROCHLORIDE 20 MG/ML
10 INJECTION INTRAMUSCULAR; INTRAVENOUS EVERY 6 HOURS PRN
Status: DISCONTINUED | OUTPATIENT
Start: 2017-04-20 | End: 2017-04-22 | Stop reason: HOSPADM

## 2017-04-20 RX ORDER — DIPHENHYDRAMINE HCL 25 MG
25 CAPSULE ORAL EVERY 6 HOURS PRN
Status: DISCONTINUED | OUTPATIENT
Start: 2017-04-20 | End: 2017-04-22 | Stop reason: HOSPADM

## 2017-04-20 RX ORDER — ACETAMINOPHEN 325 MG/1
650 TABLET ORAL EVERY 6 HOURS PRN
Status: DISCONTINUED | OUTPATIENT
Start: 2017-04-20 | End: 2017-04-22 | Stop reason: HOSPADM

## 2017-04-20 RX ORDER — CARVEDILOL 12.5 MG/1
12.5 TABLET ORAL 2 TIMES DAILY WITH MEALS
Status: DISCONTINUED | OUTPATIENT
Start: 2017-04-20 | End: 2017-04-22 | Stop reason: HOSPADM

## 2017-04-20 RX ORDER — OXYCODONE AND ACETAMINOPHEN 7.5; 325 MG/1; MG/1
1 TABLET ORAL EVERY 4 HOURS PRN
Status: DISCONTINUED | OUTPATIENT
Start: 2017-04-20 | End: 2017-04-22 | Stop reason: HOSPADM

## 2017-04-20 RX ORDER — ENOXAPARIN SODIUM 100 MG/ML
40 INJECTION SUBCUTANEOUS EVERY 24 HOURS
Status: DISCONTINUED | OUTPATIENT
Start: 2017-04-20 | End: 2017-04-22 | Stop reason: HOSPADM

## 2017-04-20 RX ORDER — LISINOPRIL 5 MG/1
5 TABLET ORAL DAILY
Status: DISCONTINUED | OUTPATIENT
Start: 2017-04-20 | End: 2017-04-22 | Stop reason: HOSPADM

## 2017-04-20 RX ORDER — FUROSEMIDE 10 MG/ML
80 INJECTION INTRAMUSCULAR; INTRAVENOUS 2 TIMES DAILY
Status: DISCONTINUED | OUTPATIENT
Start: 2017-04-20 | End: 2017-04-20

## 2017-04-20 RX ORDER — FUROSEMIDE 10 MG/ML
40 INJECTION INTRAMUSCULAR; INTRAVENOUS 3 TIMES DAILY
Status: COMPLETED | OUTPATIENT
Start: 2017-04-20 | End: 2017-04-21

## 2017-04-20 RX ORDER — SIMVASTATIN 5 MG/1
10 TABLET, FILM COATED ORAL NIGHTLY
Status: DISCONTINUED | OUTPATIENT
Start: 2017-04-20 | End: 2017-04-22 | Stop reason: HOSPADM

## 2017-04-20 RX ORDER — MAG HYDROX/ALUMINUM HYD/SIMETH 200-200-20
30 SUSPENSION, ORAL (FINAL DOSE FORM) ORAL EVERY 6 HOURS PRN
Status: DISCONTINUED | OUTPATIENT
Start: 2017-04-20 | End: 2017-04-22 | Stop reason: HOSPADM

## 2017-04-20 RX ORDER — ALLOPURINOL 100 MG/1
100 TABLET ORAL DAILY
Status: DISCONTINUED | OUTPATIENT
Start: 2017-04-20 | End: 2017-04-22 | Stop reason: HOSPADM

## 2017-04-20 RX ORDER — ASPIRIN 81 MG/1
81 TABLET ORAL DAILY
Status: DISCONTINUED | OUTPATIENT
Start: 2017-04-20 | End: 2017-04-22 | Stop reason: HOSPADM

## 2017-04-20 RX ORDER — ONDANSETRON 2 MG/ML
4 INJECTION INTRAMUSCULAR; INTRAVENOUS EVERY 8 HOURS PRN
Status: DISCONTINUED | OUTPATIENT
Start: 2017-04-20 | End: 2017-04-22 | Stop reason: HOSPADM

## 2017-04-20 RX ADMIN — ASPIRIN 81 MG: 81 TABLET, COATED ORAL at 08:04

## 2017-04-20 RX ADMIN — SODIUM CHLORIDE, PRESERVATIVE FREE 3 ML: 5 INJECTION INTRAVENOUS at 03:04

## 2017-04-20 RX ADMIN — CARVEDILOL 12.5 MG: 12.5 TABLET, FILM COATED ORAL at 07:04

## 2017-04-20 RX ADMIN — LISINOPRIL 5 MG: 5 TABLET ORAL at 08:04

## 2017-04-20 RX ADMIN — SODIUM CHLORIDE, PRESERVATIVE FREE 3 ML: 5 INJECTION INTRAVENOUS at 09:04

## 2017-04-20 RX ADMIN — FUROSEMIDE 40 MG: 10 INJECTION, SOLUTION INTRAMUSCULAR; INTRAVENOUS at 09:04

## 2017-04-20 RX ADMIN — SODIUM CHLORIDE, PRESERVATIVE FREE 3 ML: 5 INJECTION INTRAVENOUS at 05:04

## 2017-04-20 RX ADMIN — FUROSEMIDE 40 MG: 10 INJECTION, SOLUTION INTRAMUSCULAR; INTRAVENOUS at 03:04

## 2017-04-20 RX ADMIN — CARVEDILOL 12.5 MG: 12.5 TABLET, FILM COATED ORAL at 06:04

## 2017-04-20 RX ADMIN — SIMVASTATIN 10 MG: 5 TABLET, FILM COATED ORAL at 02:04

## 2017-04-20 RX ADMIN — INSULIN DETEMIR 40 UNITS: 100 INJECTION, SOLUTION SUBCUTANEOUS at 08:04

## 2017-04-20 RX ADMIN — ENOXAPARIN SODIUM 40 MG: 100 INJECTION SUBCUTANEOUS at 06:04

## 2017-04-20 RX ADMIN — FUROSEMIDE 40 MG: 10 INJECTION, SOLUTION INTRAMUSCULAR; INTRAVENOUS at 05:04

## 2017-04-20 RX ADMIN — PANTOPRAZOLE SODIUM 40 MG: 40 TABLET, DELAYED RELEASE ORAL at 08:04

## 2017-04-20 RX ADMIN — INSULIN DETEMIR 40 UNITS: 100 INJECTION, SOLUTION SUBCUTANEOUS at 09:04

## 2017-04-20 RX ADMIN — SIMVASTATIN 10 MG: 5 TABLET, FILM COATED ORAL at 09:04

## 2017-04-20 RX ADMIN — ALLOPURINOL 100 MG: 100 TABLET ORAL at 08:04

## 2017-04-20 NOTE — SUBJECTIVE & OBJECTIVE
Interval History: 60 yo AAM with combined systolic/dystolic dysfunction presented complaining of SOB associated with significant weight gain and leg edema. He has diuresed over 1900 cc and feels a little better. He denies any chest pain and states he has been compliant with his diet and medications at home.    Review of Systems   Constitutional: Positive for unexpected weight change.   HENT: Negative.    Respiratory: Positive for shortness of breath.    Cardiovascular: Positive for leg swelling.   Gastrointestinal: Negative.    Endocrine: Negative.    Genitourinary: Negative.    Musculoskeletal: Negative.    All other systems reviewed and are negative.    Objective:     Vital Signs (Most Recent):  Temp: 98 °F (36.7 °C) (04/20/17 1340)  Pulse: 76 (04/20/17 1340)  Resp: 18 (04/20/17 1340)  BP: 133/83 (04/20/17 1340)  SpO2: 100 % (04/20/17 1340) Vital Signs (24h Range):  Temp:  [97.7 °F (36.5 °C)-98.5 °F (36.9 °C)] 98 °F (36.7 °C)  Pulse:  [] 76  Resp:  [16-20] 18  SpO2:  [92 %-100 %] 100 %  BP: (119-150)/(73-99) 133/83     Weight: 98 kg (216 lb)  Body mass index is 32.84 kg/(m^2).    Intake/Output Summary (Last 24 hours) at 04/20/17 1407  Last data filed at 04/20/17 0900   Gross per 24 hour   Intake              203 ml   Output             2170 ml   Net            -1967 ml      Physical Exam   Constitutional: He is oriented to person, place, and time.   Obese AAM in bed and appears comfortable   HENT:   Head: Normocephalic and atraumatic.   Nose: Nose normal.   Mouth/Throat: Oropharynx is clear and moist.   Eyes: Conjunctivae and EOM are normal. Pupils are equal, round, and reactive to light.   Neck: Normal range of motion.   Cardiovascular: Normal rate, regular rhythm and intact distal pulses.    Pulmonary/Chest: Effort normal and breath sounds normal.   Abdominal: Soft. Bowel sounds are normal.   Musculoskeletal:   3+ pitting edema   Neurological: He is alert and oriented to person, place, and time.   Skin:  Skin is warm and dry.   Psychiatric: He has a normal mood and affect.       Significant Labs:   BMP:   Recent Labs  Lab 04/20/17  0400   *      K 3.7      CO2 28   BUN 35*   CREATININE 1.5*   CALCIUM 8.7   MG 1.5*     CBC:   Recent Labs  Lab 04/19/17 2202 04/20/17  0400   WBC 5.19 5.99   HGB 13.6* 12.7*   HCT 40.8 38.2*   * 126*     Cardiac Markers:   Recent Labs  Lab 04/19/17 2202   BNP 1947*     Troponin:   Recent Labs  Lab 04/19/17 2202 04/20/17  0555 04/20/17  1200   TROPONINI 0.044* 0.065* 0.046*       Significant Imaging: I have reviewed and interpreted all pertinent imaging results/findings within the past 24 hours.

## 2017-04-20 NOTE — CONSULTS
"[facility]  Cardiology  Consult Note    Patient Name: Yan Choudhury  MRN: [id number]  Admission Date: 4/19/2017  Hospital Length of Stay: 1 days  Code Status: Full Code   Attending Provider: No att. providers found   Consulting Provider: Yelena Hannon MD  Primary Care Physician: Sandra Estevez MD  Principal Problem:Acute on chronic combined systolic and diastolic congestive heart failure    Patient information was obtained from patient and ER records.     Inpatient consult to Cardiology  Consult performed by: YELENA HANNON  Consult ordered by: KATE MONTILLA        Subjective:     Chief Complaint:  CHF exacerbation.     HPI: 62 yo male, was sent to ER from cardiology office due to CHF.   PMH CAD/ICM, DCM, LBBB, HTN, CRI, DM, and dyslipidemia, obesity. Nonsmoker. LHC was done 2009 showed diffuse CAD, and severe distal CAD involving apical LAD, distal RCA/distal LCX. Echo 10/10 showed LVEF 25%. PET stress 2016 showed inferior scar, no significant ischemia noted and LVEF < 35%. s/p BIV ICD 3/16 for CHF/LBBB.   Has had SOB, cough for two to three weeks. His weight has also increased by 16 pounds. Patient denies any PND or orthopnea but wife reports he has nocturnal "gagging" and "coughing" while he is lying flat. Lower extremity edema is also increased. Patient reports compliance with his medications. He traveled out of town for the Easter holiday and indulged in crawfish, ochoa, and fried fish.   In ER, had Lasix 80 mg ivp and dyspnea improved.    Past Medical History:   Diagnosis Date    Arthritis     CAD (coronary artery disease)     Cataract     CHF (congestive heart failure)     Dr Calvillo    Chronic combined systolic and diastolic congestive heart failure 3/24/2015    CKD (chronic kidney disease), stage III     Diabetes mellitus type II      AM    Diabetic retinopathy     anti Veg F injections for macular edema    Diverticulitis of colon     ED (erectile dysfunction)     Glaucoma     HTN " "(hypertension)     Hyperlipidemia     Ischemic cardiomyopathy     Obesity     Pulmonary HTN        Past Surgical History:   Procedure Laterality Date    CARDIAC CATHETERIZATION  2009    CHOLECYSTECTOMY      COLONOSCOPY      EYE SURGERY      KNEE SURGERY         Review of patient's allergies indicates:  No Known Allergies    No current facility-administered medications on file prior to encounter.      Current Outpatient Prescriptions on File Prior to Encounter   Medication Sig    allopurinol (ZYLOPRIM) 100 MG tablet TAKE 1 TABLET (100 MG TOTAL) BY MOUTH ONCE DAILY.    aspirin (ECOTRIN) 81 MG EC tablet Take 81 mg by mouth once daily.    bumetanide (BUMEX) 2 MG tablet TAKE 1 TABLET (2 MG TOTAL) BY MOUTH 2 (TWO) TIMES DAILY.    carvedilol (COREG) 25 MG tablet Take 12.5 mg by mouth 2 (two) times daily with meals.    guaifenesin (MUCINEX) 600 mg 12 hr tablet Take 1,200 mg by mouth 2 (two) times daily as needed.     lisinopril (PRINIVIL,ZESTRIL) 5 MG tablet Take 1 tablet (5 mg total) by mouth once daily.    pantoprazole (PROTONIX) 40 MG tablet TAKE 1 TABLET BY MOUTH EVERY DAY FOR ACID REFLUX    potassium chloride SA (K-DUR,KLOR-CON) 10 MEQ tablet TAKE 1 TABLET BY MOUTH EVERY DAY    simvastatin (ZOCOR) 10 MG tablet TAKE 1 TABLET BY MOUTH EVERY EVENING FOR CHOLESTEROL    BD INSULIN SYRINGE ULTRA-FINE 1/2 mL 31 x 5/16" Syrg USE AS DIRECTED TO INJECT INSULIN TWO TIMES DAILY    BD INSULIN SYRINGE ULTRA-FINE 1/2 mL 31 x 5/16" Syrg USE AS DIRECTED TO INJECT INSULIN TWO TIMES DAILY    insulin glargine (LANTUS) 100 unit/mL injection 50 units bid    insulin needles, disposable, (BD ULTRA-FINE JOSLYN PEN NEEDLES) 32 x 5/32 " Ndle 1 each by Misc.(Non-Drug; Combo Route) route 4 (four) times daily.    IRON/VITAMIN B COMPLEX (GERITOL ORAL) Take by mouth.     Family History     Problem Relation (Age of Onset)    Cancer Mother    Heart disease Father    No Known Problems Sister, Brother, Maternal Aunt, Maternal Uncle, " "Paternal Aunt, Paternal Uncle, Maternal Grandmother, Maternal Grandfather, Paternal Grandmother, Paternal Grandfather    Stroke Father        Social History Main Topics    Smoking status: Never Smoker    Smokeless tobacco: Never Used    Alcohol use 0.0 oz/week     0 Standard drinks or equivalent per week      Comment: " once in awhile...special occassions"    Drug use: No    Sexual activity: Not Currently     Review of Systems   Constitution: Negative for decreased appetite, diaphoresis, fever, weakness, malaise/fatigue and night sweats.   HENT: Negative for headaches and nosebleeds.    Eyes: Negative for blurred vision and double vision.   Cardiovascular: Positive for dyspnea on exertion and leg swelling. Negative for chest pain, claudication, irregular heartbeat, near-syncope, orthopnea, palpitations, paroxysmal nocturnal dyspnea and syncope.   Respiratory: Positive for cough and shortness of breath. Negative for sleep disturbances due to breathing, snoring, sputum production and wheezing.    Endocrine: Negative for cold intolerance and polyuria.   Hematologic/Lymphatic: Does not bruise/bleed easily.   Skin: Negative for rash.   Musculoskeletal: Negative for back pain, falls, joint pain, joint swelling and neck pain.   Gastrointestinal: Negative for abdominal pain, heartburn, nausea and vomiting.   Genitourinary: Negative for dysuria, frequency and hematuria.   Neurological: Negative for difficulty with concentration, dizziness, focal weakness, light-headedness, numbness and seizures.   Psychiatric/Behavioral: Negative for depression, memory loss and substance abuse. The patient does not have insomnia.    Allergic/Immunologic: Negative for HIV exposure and hives.     Objective:     Vital Signs (Most Recent):  Temp: 97.7 °F (36.5 °C) (04/20/17 0705)  Pulse: 96 (04/20/17 0910)  Resp: 20 (04/20/17 0910)  BP: (!) 146/92 (04/20/17 0910)  SpO2: 99 % (04/20/17 0910) Vital Signs (24h Range):  Temp:  [97.7 °F (36.5 " °C)-98.5 °F (36.9 °C)] 97.7 °F (36.5 °C)  Pulse:  [] 96  Resp:  [16-20] 20  SpO2:  [92 %-100 %] 99 %  BP: (119-150)/(73-99) 146/92     Weight: 98 kg (216 lb)  Body mass index is 32.84 kg/(m^2).    SpO2: 99 %  O2 Device (Oxygen Therapy): room air      Intake/Output Summary (Last 24 hours) at 04/20/17 1019  Last data filed at 04/20/17 0900   Gross per 24 hour   Intake              203 ml   Output             2170 ml   Net            -1967 ml       Lines/Drains/Airways     Drain                 Colostomy 04/22/16 1710 Descending/sigmoid  days          Peripheral Intravenous Line                 Peripheral IV - Single Lumen 04/19/17 2202 Right Antecubital less than 1 day                Physical Exam   Constitutional: He is oriented to person, place, and time. He appears well-nourished.   HENT:   Head: Normocephalic.   Eyes: Pupils are equal, round, and reactive to light.   Neck: Normal carotid pulses and no JVD present. Carotid bruit is not present. No thyromegaly present.   Cardiovascular: Normal rate, regular rhythm, normal heart sounds and normal pulses.   No extrasystoles are present. PMI is not displaced.  Exam reveals no gallop and no S3.    No murmur heard.  Pulses:       Radial pulses are 2+ on the right side, and 2+ on the left side.   Elevated JVP  Mild resp. Distress     Pulmonary/Chest: Breath sounds normal. No stridor. No respiratory distress.   Abdominal: Soft. Bowel sounds are normal. There is no tenderness. There is no rebound.   Left s/p colectomy   Musculoskeletal: Normal range of motion. He exhibits edema.   1+ b/l   Neurological: He is alert and oriented to person, place, and time.   Skin: Skin is intact. No rash noted.   Psychiatric: His behavior is normal.       Significant Labs:   ABG: No results for input(s): PH, PCO2, HCO3, POCSATURATED, BE in the last 48 hours., BMP:   Recent Labs  Lab 04/19/17  2202 04/20/17  0027 04/20/17  0400   *  --  138*     --  141   K 3.7  --   3.7     --  105   CO2 27  --  28   BUN 35*  --  35*   CREATININE 1.6*  --  1.5*   CALCIUM 8.9  --  8.7   MG  --  1.5* 1.5*   , CMP   Recent Labs  Lab 04/19/17 2202 04/20/17  0400    141   K 3.7 3.7    105   CO2 27 28   * 138*   BUN 35* 35*   CREATININE 1.6* 1.5*   CALCIUM 8.9 8.7   PROT 7.6  --    ALBUMIN 2.6*  --    BILITOT 1.3*  --    ALKPHOS 108  --    AST 33  --    ALT 14  --    ANIONGAP 8 8   ESTGFRAFRICA 52* 56*   EGFRNONAA 45* 49*   , CBC   Recent Labs  Lab 04/19/17 2202 04/20/17  0400   WBC 5.19 5.99   HGB 13.6* 12.7*   HCT 40.8 38.2*   * 126*   , Lipid Panel No results for input(s): CHOL, HDL, LDLCALC, TRIG, CHOLHDL in the last 48 hours., Troponin   Recent Labs  Lab 04/19/17 2202 04/20/17  0555   TROPONINI 0.044* 0.065*    and BNP      Significant Imaging: X-Ray: CXR: X-Ray Chest 1 View (CXR): No results found for this visit on 04/19/17.  Assessment and Plan:     CHF rEF, exacerbation. Noncompliant with low salt diet  Elevated troponin demand from CHF  CAD old MI  EF 25%  S/p ICD    Plan: aggressive IV diuresis lasix 80 mg iv bid.  Continue coreg, ACEI, ASA and statin.  DASH and fluid restriction      Active Diagnoses:    Diagnosis Date Noted POA    PRINCIPAL PROBLEM:  Acute on chronic combined systolic and diastolic congestive heart failure [I50.43] 04/19/2017 Yes    Type 2 diabetes mellitus with stage 3 chronic kidney disease, with long-term current use of insulin [E11.22, N18.3, Z79.4] 11/17/2016 Not Applicable    CAD, multiple vessel [I25.10] 07/13/2015 Yes     Chronic    Stage 3 chronic kidney disease [N18.3]  Yes      Problems Resolved During this Admission:    Diagnosis Date Noted Date Resolved POA       VTE Risk Mitigation         Ordered     enoxaparin injection 40 mg  Daily     Route:  Subcutaneous        04/20/17 0146     Medium Risk of VTE  Once      04/20/17 0146     Place sequential compression device  Until discontinued      04/20/17 0146          Thank  you for your consult. I will follow-up with patient. Please contact us if you have any additional questions.    Gui Hannon MD  Cardiology   [facility]    hallie

## 2017-04-20 NOTE — PLAN OF CARE
Problem: Patient Care Overview  Goal: Plan of Care Review  Outcome: Ongoing (interventions implemented as appropriate)  Patient remained free of falls, patient up with assist. Bed will remain in low position at all times. Call bell will remain in reach, and reminded too call for assistance when needed. VS will remain stable. Will do hourly rounding.

## 2017-04-20 NOTE — SUBJECTIVE & OBJECTIVE
"Past Medical History:   Diagnosis Date    Arthritis     CAD (coronary artery disease)     Cataract     CHF (congestive heart failure)     Dr Calvillo    Chronic combined systolic and diastolic congestive heart failure 3/24/2015    CKD (chronic kidney disease), stage III     Diabetes mellitus type II      AM    Diabetic retinopathy     anti Veg F injections for macular edema    Diverticulitis of colon     ED (erectile dysfunction)     Glaucoma     HTN (hypertension)     Hyperlipidemia     Ischemic cardiomyopathy     Obesity     Pulmonary HTN        Past Surgical History:   Procedure Laterality Date    CARDIAC CATHETERIZATION  2009    CHOLECYSTECTOMY      COLONOSCOPY      EYE SURGERY      KNEE SURGERY         Review of patient's allergies indicates:  No Known Allergies    No current facility-administered medications on file prior to encounter.      Current Outpatient Prescriptions on File Prior to Encounter   Medication Sig    allopurinol (ZYLOPRIM) 100 MG tablet TAKE 1 TABLET (100 MG TOTAL) BY MOUTH ONCE DAILY.    aspirin (ECOTRIN) 81 MG EC tablet Take 81 mg by mouth once daily.    bumetanide (BUMEX) 2 MG tablet TAKE 1 TABLET (2 MG TOTAL) BY MOUTH 2 (TWO) TIMES DAILY.    carvedilol (COREG) 25 MG tablet Take 12.5 mg by mouth 2 (two) times daily with meals.    guaifenesin (MUCINEX) 600 mg 12 hr tablet Take 1,200 mg by mouth 2 (two) times daily as needed.     lisinopril (PRINIVIL,ZESTRIL) 5 MG tablet Take 1 tablet (5 mg total) by mouth once daily.    pantoprazole (PROTONIX) 40 MG tablet TAKE 1 TABLET BY MOUTH EVERY DAY FOR ACID REFLUX    potassium chloride SA (K-DUR,KLOR-CON) 10 MEQ tablet TAKE 1 TABLET BY MOUTH EVERY DAY    simvastatin (ZOCOR) 10 MG tablet TAKE 1 TABLET BY MOUTH EVERY EVENING FOR CHOLESTEROL    BD INSULIN SYRINGE ULTRA-FINE 1/2 mL 31 x 5/16" Syrg USE AS DIRECTED TO INJECT INSULIN TWO TIMES DAILY    BD INSULIN SYRINGE ULTRA-FINE 1/2 mL 31 x 5/16" Syrg USE AS DIRECTED " "TO INJECT INSULIN TWO TIMES DAILY    insulin glargine (LANTUS) 100 unit/mL injection 50 units bid    insulin needles, disposable, (BD ULTRA-FINE JOSLYN PEN NEEDLES) 32 x 5/32 " Ndle 1 each by Misc.(Non-Drug; Combo Route) route 4 (four) times daily.    IRON/VITAMIN B COMPLEX (GERITOL ORAL) Take by mouth.     Family History     Problem Relation (Age of Onset)    Cancer Mother    Heart disease Father    No Known Problems Sister, Brother, Maternal Aunt, Maternal Uncle, Paternal Aunt, Paternal Uncle, Maternal Grandmother, Maternal Grandfather, Paternal Grandmother, Paternal Grandfather    Stroke Father        Social History Main Topics    Smoking status: Never Smoker    Smokeless tobacco: Never Used    Alcohol use 0.0 oz/week     0 Standard drinks or equivalent per week      Comment: " once in awhile...special occassions"    Drug use: No    Sexual activity: Not Currently     Review of Systems   Constitutional: Negative.  Negative for appetite change, fatigue and fever.   HENT: Negative.  Negative for congestion, nosebleeds and sore throat.    Eyes: Negative.  Negative for photophobia, redness and visual disturbance.   Respiratory: Positive for cough and shortness of breath. Negative for wheezing.    Cardiovascular: Positive for leg swelling. Negative for chest pain and palpitations.   Gastrointestinal: Negative.  Negative for abdominal pain, constipation, diarrhea, nausea and vomiting.   Endocrine: Negative.  Negative for polydipsia, polyphagia and polyuria.   Genitourinary: Negative.  Negative for dysuria, flank pain, frequency and urgency.   Musculoskeletal: Negative.  Negative for arthralgias, back pain and joint swelling.   Skin: Negative.  Negative for color change, pallor and rash.   Allergic/Immunologic: Negative.  Negative for environmental allergies, food allergies and immunocompromised state.   Neurological: Negative.  Negative for dizziness, syncope, weakness, light-headedness, numbness and headaches. "   Hematological: Negative.  Negative for adenopathy. Does not bruise/bleed easily.   Psychiatric/Behavioral: Negative.  Negative for confusion and hallucinations. The patient is not nervous/anxious.    All other systems reviewed and are negative.    Objective:     Vital Signs (Most Recent):  Temp: 98.5 °F (36.9 °C) (04/19/17 2034)  Pulse: 82 (04/19/17 2348)  Resp: 19 (04/19/17 2348)  BP: (!) 146/88 (04/19/17 2348)  SpO2: 98 % (04/19/17 2348) Vital Signs (24h Range):  Temp:  [98.5 °F (36.9 °C)] 98.5 °F (36.9 °C)  Pulse:  [82-90] 82  Resp:  [19-20] 19  SpO2:  [92 %-100 %] 98 %  BP: (122-150)/(76-92) 146/88     Weight: 98 kg (216 lb)  Body mass index is 32.84 kg/(m^2).    Physical Exam   Constitutional: He is oriented to person, place, and time. No distress.   Uncomfortable   HENT:   Head: Normocephalic and atraumatic.   Eyes: Conjunctivae and EOM are normal. Pupils are equal, round, and reactive to light. No scleral icterus.   Neck: Normal range of motion. Neck supple. JVD present. No thyromegaly present.   Cardiovascular: Normal rate and regular rhythm.    Murmur heard.  Pulmonary/Chest: Effort normal. No respiratory distress. He has no wheezes. He has rales. He exhibits no tenderness.   Abdominal: Soft. Bowel sounds are normal. There is no tenderness.   Musculoskeletal: Normal range of motion. He exhibits edema (3+). He exhibits no tenderness.   Lymphadenopathy:     He has no cervical adenopathy.   Neurological: He is alert and oriented to person, place, and time. No cranial nerve deficit. He exhibits normal muscle tone. Coordination normal.   Skin: Skin is warm and dry. He is not diaphoretic.   Psychiatric: He has a normal mood and affect. His behavior is normal. Thought content normal.   Nursing note and vitals reviewed.       Significant Labs:   ABGs: No results for input(s): PH, PCO2, HCO3, POCSATURATED, BE in the last 48 hours.  Bilirubin:   Recent Labs  Lab 04/10/17  1432 04/19/17  2202   BILITOT 1.2* 1.3*      BMP:   Recent Labs  Lab 04/19/17 2202   *      K 3.7      CO2 27   BUN 35*   CREATININE 1.6*   CALCIUM 8.9     CBC:   Recent Labs  Lab 04/19/17 2202   WBC 5.19   HGB 13.6*   HCT 40.8   *     CMP:   Recent Labs  Lab 04/19/17 2202      K 3.7      CO2 27   *   BUN 35*   CREATININE 1.6*   CALCIUM 8.9   PROT 7.6   ALBUMIN 2.6*   BILITOT 1.3*   ALKPHOS 108   AST 33   ALT 14   ANIONGAP 8   EGFRNONAA 45*     Cardiac Markers:   Recent Labs  Lab 04/19/17 2202   BNP 1947*     Lactic Acid: No results for input(s): LACTATE in the last 48 hours.  Lipase: No results for input(s): LIPASE in the last 48 hours.  Lipid Panel: No results for input(s): CHOL, HDL, LDLCALC, TRIG, CHOLHDL in the last 48 hours.  Respiratory Culture: No results for input(s): GSRESP, RESPIRATORYC in the last 48 hours.  Troponin:   Recent Labs  Lab 04/19/17 2202   TROPONINI 0.044*     TSH: No results for input(s): TSH in the last 4320 hours.  Urine Culture: No results for input(s): LABURIN in the last 48 hours.  All pertinent labs within the past 24 hours have been reviewed.    Significant Imaging:   Imaging Results         X-Ray Chest AP Portable (Final result) Result time:  04/19/17 21:59:03    Final result by Javad Henry Jr., MD (04/19/17 21:59:03)    Impression:     No acute cardiopulmonary disease.  Right lower lobe atelectasis versus infiltrate.      Electronically signed by: JAVAD HENRY  Date:     04/19/17  Time:    21:59     Narrative:    Exam: Portable chest radiograph    History:    Shortness of breath.    Comparison: 4/6/2017.    Findings: Persistent mild hazy opacification in the right lower lobe.  Remaining lungs are clear.  The cardiac silhouette remains enlarged.  Asymmetric are unchanged.  No pneumothorax.

## 2017-04-20 NOTE — ASSESSMENT & PLAN NOTE
- Acute exacerbation due to dietary indiscretion  - Echo from 4/2016 reviewed, EF 30% with diastolic dysfunction  - Repeat Echo in AM  - Lasix 40 mg IV TID x4 doses  - Cardiology consult  - Monitor UOP  -     Patient is feeling a little better still with 3+ pitting edema, f/u echo if not ordered then repeat, continue diuresing.

## 2017-04-20 NOTE — H&P
"[facility]  History & Physical    Patient Name: Yan Choudhury  MRN: [id number]  Admission Date: 4/19/2017  Attending Physician: Bradly Still MD  Primary Care Provider: Sandra Estevez MD         Patient information was obtained from patient, spouse/SO and ER records.     Subjective:     Principal Problem:Acute on chronic combined systolic and diastolic congestive heart failure    Chief Complaint:   Chief Complaint   Patient presents with    Leg Swelling     Pt reports generalized edema. Reports told by PCP to come to ED for admit. NADN, pt eating Hemanth's in triage.        HPI: Mr. Choudhury is a 61 year old AA male with h/o CHF, CAD/ICM (s/p [facility] 2009, diffuse CAD), DCM, LBBB, HTN, CKD3, T2DM, dyslipidemia, and obesity was sent from cardiology clinic for admission acute CHF exacerbation. Patient admits to dietary indiscretion over the Easter weekend.  He traveled out of town for the Easter holiday and indulged in crawfish, ochoa, and fried fish. Takes Bumex 2 mg po BID with no significant relief. He states he feels he is SOB even when speaking. His weight has also increased by 16 pounds. Patient denies any PND or orthopnea but wife reports he has nocturnal "gagging" and "coughing" while he is lying flat. Lower extremity edema is also increased. Patient reports compliance with his medications.    Past Medical History:   Diagnosis Date    Arthritis     CAD (coronary artery disease)     Cataract     CHF (congestive heart failure)     Dr Calvillo    Chronic combined systolic and diastolic congestive heart failure 3/24/2015    CKD (chronic kidney disease), stage III     Diabetes mellitus type II      AM    Diabetic retinopathy     anti Veg F injections for macular edema    Diverticulitis of colon     ED (erectile dysfunction)     Glaucoma     HTN (hypertension)     Hyperlipidemia     Ischemic cardiomyopathy     Obesity     Pulmonary HTN        Past Surgical History: " "  Procedure Laterality Date    CARDIAC CATHETERIZATION  2009    CHOLECYSTECTOMY      COLONOSCOPY      EYE SURGERY      KNEE SURGERY         Review of patient's allergies indicates:  No Known Allergies    No current facility-administered medications on file prior to encounter.      Current Outpatient Prescriptions on File Prior to Encounter   Medication Sig    allopurinol (ZYLOPRIM) 100 MG tablet TAKE 1 TABLET (100 MG TOTAL) BY MOUTH ONCE DAILY.    aspirin (ECOTRIN) 81 MG EC tablet Take 81 mg by mouth once daily.    bumetanide (BUMEX) 2 MG tablet TAKE 1 TABLET (2 MG TOTAL) BY MOUTH 2 (TWO) TIMES DAILY.    carvedilol (COREG) 25 MG tablet Take 12.5 mg by mouth 2 (two) times daily with meals.    guaifenesin (MUCINEX) 600 mg 12 hr tablet Take 1,200 mg by mouth 2 (two) times daily as needed.     lisinopril (PRINIVIL,ZESTRIL) 5 MG tablet Take 1 tablet (5 mg total) by mouth once daily.    pantoprazole (PROTONIX) 40 MG tablet TAKE 1 TABLET BY MOUTH EVERY DAY FOR ACID REFLUX    potassium chloride SA (K-DUR,KLOR-CON) 10 MEQ tablet TAKE 1 TABLET BY MOUTH EVERY DAY    simvastatin (ZOCOR) 10 MG tablet TAKE 1 TABLET BY MOUTH EVERY EVENING FOR CHOLESTEROL    BD INSULIN SYRINGE ULTRA-FINE 1/2 mL 31 x 5/16" Syrg USE AS DIRECTED TO INJECT INSULIN TWO TIMES DAILY    BD INSULIN SYRINGE ULTRA-FINE 1/2 mL 31 x 5/16" Syrg USE AS DIRECTED TO INJECT INSULIN TWO TIMES DAILY    insulin glargine (LANTUS) 100 unit/mL injection 50 units bid    insulin needles, disposable, (BD ULTRA-FINE JOSLYN PEN NEEDLES) 32 x 5/32 " Ndle 1 each by Misc.(Non-Drug; Combo Route) route 4 (four) times daily.    IRON/VITAMIN B COMPLEX (GERITOL ORAL) Take by mouth.     Family History     Problem Relation (Age of Onset)    Cancer Mother    Heart disease Father    No Known Problems Sister, Brother, Maternal Aunt, Maternal Uncle, Paternal Aunt, Paternal Uncle, Maternal Grandmother, Maternal Grandfather, Paternal Grandmother, Paternal Grandfather    " "Stroke Father        Social History Main Topics    Smoking status: Never Smoker    Smokeless tobacco: Never Used    Alcohol use 0.0 oz/week     0 Standard drinks or equivalent per week      Comment: " once in awhile...special occassions"    Drug use: No    Sexual activity: Not Currently     Review of Systems   Constitutional: Negative.  Negative for appetite change, fatigue and fever.   HENT: Negative.  Negative for congestion, nosebleeds and sore throat.    Eyes: Negative.  Negative for photophobia, redness and visual disturbance.   Respiratory: Positive for cough and shortness of breath. Negative for wheezing.    Cardiovascular: Positive for leg swelling. Negative for chest pain and palpitations.   Gastrointestinal: Negative.  Negative for abdominal pain, constipation, diarrhea, nausea and vomiting.   Endocrine: Negative.  Negative for polydipsia, polyphagia and polyuria.   Genitourinary: Negative.  Negative for dysuria, flank pain, frequency and urgency.   Musculoskeletal: Negative.  Negative for arthralgias, back pain and joint swelling.   Skin: Negative.  Negative for color change, pallor and rash.   Allergic/Immunologic: Negative.  Negative for environmental allergies, food allergies and immunocompromised state.   Neurological: Negative.  Negative for dizziness, syncope, weakness, light-headedness, numbness and headaches.   Hematological: Negative.  Negative for adenopathy. Does not bruise/bleed easily.   Psychiatric/Behavioral: Negative.  Negative for confusion and hallucinations. The patient is not nervous/anxious.    All other systems reviewed and are negative.    Objective:     Vital Signs (Most Recent):  Temp: 98.5 °F (36.9 °C) (04/19/17 2034)  Pulse: 82 (04/19/17 2348)  Resp: 19 (04/19/17 2348)  BP: (!) 146/88 (04/19/17 2348)  SpO2: 98 % (04/19/17 2348) Vital Signs (24h Range):  Temp:  [98.5 °F (36.9 °C)] 98.5 °F (36.9 °C)  Pulse:  [82-90] 82  Resp:  [19-20] 19  SpO2:  [92 %-100 %] 98 %  BP: " (122-150)/(76-92) 146/88     Weight: 98 kg (216 lb)  Body mass index is 32.84 kg/(m^2).    Physical Exam   Constitutional: He is oriented to person, place, and time. No distress.   Uncomfortable   HENT:   Head: Normocephalic and atraumatic.   Eyes: Conjunctivae and EOM are normal. Pupils are equal, round, and reactive to light. No scleral icterus.   Neck: Normal range of motion. Neck supple. JVD present. No thyromegaly present.   Cardiovascular: Normal rate and regular rhythm.    Murmur heard.  Pulmonary/Chest: Effort normal. No respiratory distress. He has no wheezes. He has rales. He exhibits no tenderness.   Abdominal: Soft. Bowel sounds are normal. There is no tenderness.   Musculoskeletal: Normal range of motion. He exhibits edema (3+). He exhibits no tenderness.   Lymphadenopathy:     He has no cervical adenopathy.   Neurological: He is alert and oriented to person, place, and time. No cranial nerve deficit. He exhibits normal muscle tone. Coordination normal.   Skin: Skin is warm and dry. He is not diaphoretic.   Psychiatric: He has a normal mood and affect. His behavior is normal. Thought content normal.   Nursing note and vitals reviewed.       Significant Labs:   ABGs: No results for input(s): PH, PCO2, HCO3, POCSATURATED, BE in the last 48 hours.  Bilirubin:   Recent Labs  Lab 04/10/17  1432 04/19/17 2202   BILITOT 1.2* 1.3*     BMP:   Recent Labs  Lab 04/19/17 2202   *      K 3.7      CO2 27   BUN 35*   CREATININE 1.6*   CALCIUM 8.9     CBC:   Recent Labs  Lab 04/19/17 2202   WBC 5.19   HGB 13.6*   HCT 40.8   *     CMP:   Recent Labs  Lab 04/19/17 2202      K 3.7      CO2 27   *   BUN 35*   CREATININE 1.6*   CALCIUM 8.9   PROT 7.6   ALBUMIN 2.6*   BILITOT 1.3*   ALKPHOS 108   AST 33   ALT 14   ANIONGAP 8   EGFRNONAA 45*     Cardiac Markers:   Recent Labs  Lab 04/19/17 2202   BNP 1947*     Lactic Acid: No results for input(s): LACTATE in the last 48  hours.  Lipase: No results for input(s): LIPASE in the last 48 hours.  Lipid Panel: No results for input(s): CHOL, HDL, LDLCALC, TRIG, CHOLHDL in the last 48 hours.  Respiratory Culture: No results for input(s): GSRESP, RESPIRATORYC in the last 48 hours.  Troponin:   Recent Labs  Lab 04/19/17  2202   TROPONINI 0.044*     TSH: No results for input(s): TSH in the last 4320 hours.  Urine Culture: No results for input(s): LABURIN in the last 48 hours.  All pertinent labs within the past 24 hours have been reviewed.    Significant Imaging:   Imaging Results         X-Ray Chest AP Portable (Final result) Result time:  04/19/17 21:59:03    Final result by Javad Henry Jr., MD (04/19/17 21:59:03)    Impression:     No acute cardiopulmonary disease.  Right lower lobe atelectasis versus infiltrate.      Electronically signed by: JAVAD HENRY  Date:     04/19/17  Time:    21:59     Narrative:    Exam: Portable chest radiograph    History:    Shortness of breath.    Comparison: 4/6/2017.    Findings: Persistent mild hazy opacification in the right lower lobe.  Remaining lungs are clear.  The cardiac silhouette remains enlarged.  Asymmetric are unchanged.  No pneumothorax.                Assessment/Plan:     * Acute on chronic combined systolic and diastolic congestive heart failure  - Acute exacerbation due to dietary indiscretion  - Echo from 4/2016 reviewed, EF 30% with diastolic dysfunction  - Repeat Echo in AM  - Lasix 40 mg IV TID x4 doses  - Cardiology consult  - Monitor UOP  -     CAD, multiple vessel  - Resume home medications      Type 2 diabetes mellitus with stage 3 chronic kidney disease, with long-term current use of insulin  - Resume home insulin regimen, at lower dose  - ISS      Stage 3 chronic kidney disease  - Stable, at baseline  - Monitor      VTE Risk Mitigation     Fredx        Bradly Still MD  Department of Hospital Medicine   [facility]

## 2017-04-20 NOTE — ASSESSMENT & PLAN NOTE
- Acute exacerbation due to dietary indiscretion  - Echo from 4/2016 reviewed, EF 30% with diastolic dysfunction  - Repeat Echo in AM  - Lasix 40 mg IV TID x4 doses  - Cardiology consult  - Monitor UOP  -

## 2017-04-20 NOTE — PROGRESS NOTES
"[facility] Hospital [facility]  Progress Note    Patient Name: Yan Choudhury  MRN: [id number]  Patient Class: IP- Inpatient   Admission Date: 4/19/2017  Length of Stay: 1 days  Attending Physician: Bradly Still MD  Primary Care Provider: Sandra Estevez MD        Subjective:     Principal Problem:Acute on chronic combined systolic and diastolic congestive heart failure    HPI:  Mr. Choudhury is a 61 year old AA male with h/o CHF, CAD/ICM (s/p [facility] 2009, diffuse CAD), DCM, LBBB, HTN, CKD3, T2DM, dyslipidemia, and obesity was sent from cardiology clinic for admission acute CHF exacerbation. Patient admits to dietary indiscretion over the Easter weekend.  He traveled out of town for the Easter holiday and indulged in crawfish, ochoa, and fried fish. Takes Bumex 2 mg po BID with no significant relief. He states he feels he is SOB even when speaking. His weight has also increased by 16 pounds. Patient denies any PND or orthopnea but wife reports he has nocturnal "gagging" and "coughing" while he is lying flat. Lower extremity edema is also increased. Patient reports compliance with his medications.    Hospital Course:       Interval History: 62 yo AAM with combined systolic/dystolic dysfunction presented complaining of SOB associated with significant weight gain and leg edema. He has diuresed over 1900 cc and feels a little better. He denies any chest pain and states he has been compliant with his diet and medications at home.    Review of Systems   Constitutional: Positive for unexpected weight change.   HENT: Negative.    Respiratory: Positive for shortness of breath.    Cardiovascular: Positive for leg swelling.   Gastrointestinal: Negative.    Endocrine: Negative.    Genitourinary: Negative.    Musculoskeletal: Negative.    All other systems reviewed and are negative.    Objective:     Vital Signs (Most Recent):  Temp: 98 °F (36.7 °C) (04/20/17 1340)  Pulse: 76 (04/20/17 1340)  Resp: 18 (04/20/17 1340)  BP: " 133/83 (04/20/17 1340)  SpO2: 100 % (04/20/17 1340) Vital Signs (24h Range):  Temp:  [97.7 °F (36.5 °C)-98.5 °F (36.9 °C)] 98 °F (36.7 °C)  Pulse:  [] 76  Resp:  [16-20] 18  SpO2:  [92 %-100 %] 100 %  BP: (119-150)/(73-99) 133/83     Weight: 98 kg (216 lb)  Body mass index is 32.84 kg/(m^2).    Intake/Output Summary (Last 24 hours) at 04/20/17 1407  Last data filed at 04/20/17 0900   Gross per 24 hour   Intake              203 ml   Output             2170 ml   Net            -1967 ml      Physical Exam   Constitutional: He is oriented to person, place, and time.   Obese AAM in bed and appears comfortable   HENT:   Head: Normocephalic and atraumatic.   Nose: Nose normal.   Mouth/Throat: Oropharynx is clear and moist.   Eyes: Conjunctivae and EOM are normal. Pupils are equal, round, and reactive to light.   Neck: Normal range of motion.   Cardiovascular: Normal rate, regular rhythm and intact distal pulses.    Pulmonary/Chest: Effort normal and breath sounds normal.   Abdominal: Soft. Bowel sounds are normal.   Musculoskeletal:   3+ pitting edema   Neurological: He is alert and oriented to person, place, and time.   Skin: Skin is warm and dry.   Psychiatric: He has a normal mood and affect.       Significant Labs:   BMP:   Recent Labs  Lab 04/20/17  0400   *      K 3.7      CO2 28   BUN 35*   CREATININE 1.5*   CALCIUM 8.7   MG 1.5*     CBC:   Recent Labs  Lab 04/19/17 2202 04/20/17  0400   WBC 5.19 5.99   HGB 13.6* 12.7*   HCT 40.8 38.2*   * 126*     Cardiac Markers:   Recent Labs  Lab 04/19/17 2202   BNP 1947*     Troponin:   Recent Labs  Lab 04/19/17 2202 04/20/17  0555 04/20/17  1200   TROPONINI 0.044* 0.065* 0.046*       Significant Imaging: I have reviewed and interpreted all pertinent imaging results/findings within the past 24 hours.    Assessment/Plan:      * Acute on chronic combined systolic and diastolic congestive heart failure  - Acute exacerbation due to dietary  indiscretion  - Echo from 4/2016 reviewed, EF 30% with diastolic dysfunction  - Repeat Echo in AM  - Lasix 40 mg IV TID x4 doses  - Cardiology consult  - Monitor UOP  -     Patient is feeling a little better still with 3+ pitting edema, f/u echo if not ordered then repeat, continue diuresing.    CAD, multiple vessel  - Resume home medications    F/u CE, to rule out ACS as cause of decompensation of his CHF      Biventricular ICD (implantable cardioverter-defibrillator) in place  stable      Type 2 diabetes mellitus with stage 3 chronic kidney disease, with long-term current use of insulin  - Resume home insulin regimen, at lower dose  - ISS   continue SSI    VTE Risk Mitigation         Ordered     enoxaparin injection 40 mg  Daily     Route:  Subcutaneous        04/20/17 0146     Medium Risk of VTE  Once      04/20/17 0146     Place sequential compression device  Until discontinued      04/20/17 0146          Alessia Huerta MD  Department of Hospital Medicine   [facility]

## 2017-04-20 NOTE — ED PROVIDER NOTES
SCRIBE #1 NOTE: I, Corinne Mack, am scribing for, and in the presence of, Gaurav Lutz MD. I have scribed the entire note.      History      Chief Complaint   Patient presents with    Leg Swelling     Pt reports generalized edema. Reports told by PCP to come to ED for admit. NADN, pt eating Hemanth's in triage.       Review of patient's allergies indicates:  No Known Allergies     HPI   HPI    4/19/2017, 9:07 PM   History obtained from the patient      History of Present Illness: Yan Choudhury is a 61 y.o. male patient who presents to the Emergency Department for bilateral leg swelling which onset gradually. Symptoms are constant and moderate in severity. Pt was instructed by PCP to present to ED. Pt has gained 16 lbs in fluid. No mitigating or exacerbating factors reported. Associated sxs include SOB. Patient denies any fever, CP, N/V, HA, dizziness, numbness, and all other sxs at this time. No further complaints or concerns at this time.         Arrival mode: Personal vehicle     PCP: Sandra Estevez MD       Past Medical History:  Past Medical History:   Diagnosis Date    Arthritis     CAD (coronary artery disease)     Cataract     CHF (congestive heart failure)     Dr Calvillo    Chronic combined systolic and diastolic congestive heart failure 3/24/2015    CKD (chronic kidney disease), stage III     Diabetes mellitus type II      AM    Diabetic retinopathy     anti Veg F injections for macular edema    Diverticulitis of colon     ED (erectile dysfunction)     Glaucoma     HTN (hypertension)     Hyperlipidemia     Ischemic cardiomyopathy     Obesity     Pulmonary HTN        Past Surgical History:  Past Surgical History:   Procedure Laterality Date    CARDIAC CATHETERIZATION  2009    CHOLECYSTECTOMY      COLONOSCOPY      EYE SURGERY      KNEE SURGERY           Family History:  Family History   Problem Relation Age of Onset    Cancer Mother     Heart disease Father     Stroke Father      "No Known Problems Sister     No Known Problems Brother     No Known Problems Maternal Aunt     No Known Problems Maternal Uncle     No Known Problems Paternal Aunt     No Known Problems Paternal Uncle     No Known Problems Maternal Grandmother     No Known Problems Maternal Grandfather     No Known Problems Paternal Grandmother     No Known Problems Paternal Grandfather     Amblyopia Neg Hx     Blindness Neg Hx     Cataracts Neg Hx     Diabetes Neg Hx     Glaucoma Neg Hx     Hypertension Neg Hx     Macular degeneration Neg Hx     Retinal detachment Neg Hx     Strabismus Neg Hx     Thyroid disease Neg Hx        Social History:  Social History     Social History Main Topics    Smoking status: Never Smoker    Smokeless tobacco: Never Used    Alcohol use 0.0 oz/week     0 Standard drinks or equivalent per week      Comment: " once in awhile...special occassions"    Drug use: No    Sexual activity: Not Currently       ROS   Review of Systems   Constitutional: Negative for fever.   HENT: Negative for sore throat.    Respiratory: Positive for shortness of breath. Negative for cough.    Cardiovascular: Negative for chest pain.   Gastrointestinal: Negative for abdominal pain, nausea and vomiting.   Genitourinary: Negative for dysuria.   Musculoskeletal: Negative for back pain.        (+) BLE edema   Skin: Negative for rash.   Neurological: Negative for dizziness, weakness, numbness and headaches.   Hematological: Does not bruise/bleed easily.       Physical Exam    Initial Vitals   BP Pulse Resp Temp SpO2   04/19/17 2034 04/19/17 2034 04/19/17 2034 04/19/17 2034 04/19/17 2034   150/87 90 20 98.5 °F (36.9 °C) 92 %      Physical Exam  Nursing Notes and Vital Signs Reviewed.  Constitutional: Patient is in no acute distress. Awake and alert. Well-developed and well-nourished.  Head: Atraumatic. Normocephalic.  Eyes: PERRL. EOM intact. Conjunctivae are not pale. No scleral icterus.  ENT: Mucous membranes " are moist. Oropharynx is clear and symmetric.    Neck: Supple. Full ROM. No lymphadenopathy.  Cardiovascular: Regular rate. Regular rhythm. No murmurs, rubs, or gallops. Distal pulses are 2+ and symmetri.  Pulmonary/Chest: No respiratory distress. Clear to auscultation bilaterally. No wheezing, rales, or rhonchi.  Abdominal: Soft and non-distended.  There is no tenderness.  No rebound, guarding, or rigidity. LLQ colostomy.  Musculoskeletal: Moves all extremities. No obvious deformities. 3+ BLE pitting edema. No calf tenderness.  Skin: Warm and dry.  Neurological:  Alert, awake, and appropriate.  Normal speech.  No acute focal neurological deficits are appreciated.  Psychiatric: Normal affect. Good eye contact. Appropriate in content.    ED Course    Procedures  ED Vital Signs:  Vitals:    04/21/17 0654 04/21/17 0805 04/21/17 1236 04/21/17 1300   BP:  (!) 105/58 129/75    Pulse:  78 72    Resp:  18 18    Temp:  98 °F (36.7 °C) 97.6 °F (36.4 °C)    TempSrc:  Oral Oral    SpO2: 96% 96% 99% 99%   Weight:       Height:        04/21/17 1537 04/21/17 1915 04/21/17 2115 04/21/17 2310   BP: 123/76 128/77  115/61   Pulse: 71 78 76 80   Resp: 18 18  20   Temp: 97.7 °F (36.5 °C) 97.7 °F (36.5 °C)  98.9 °F (37.2 °C)   TempSrc: Oral Oral  Oral   SpO2: 100% 100%  99%   Weight:       Height:        04/22/17 0110 04/22/17 0310 04/22/17 0643 04/22/17 0853   BP:  121/62  127/73   Pulse: 81 75  81   Resp:  18  20   Temp:  98.8 °F (37.1 °C)  97.6 °F (36.4 °C)   TempSrc:  Oral  Oral   SpO2:  100%  (!) 94%   Weight:   96 kg (211 lb 10.3 oz)    Height:        04/22/17 0857 04/22/17 0923 04/22/17 1200   BP:   128/74   Pulse: 81 (!) 51 78   Resp:  18 18   Temp:   97.9 °F (36.6 °C)   TempSrc:   Oral   SpO2:  98% 98%   Weight:      Height:          Abnormal Lab Results:  Labs Reviewed   CBC W/ AUTO DIFFERENTIAL - Abnormal; Notable for the following:        Result Value    Hemoglobin 13.6 (*)     RDW 17.5 (*)     Platelets 121 (*)     All other  components within normal limits   COMPREHENSIVE METABOLIC PANEL - Abnormal; Notable for the following:     Glucose 157 (*)     BUN, Bld 35 (*)     Creatinine 1.6 (*)     Albumin 2.6 (*)     Total Bilirubin 1.3 (*)     eGFR if  52 (*)     eGFR if non  45 (*)     All other components within normal limits   TROPONIN I - Abnormal; Notable for the following:     Troponin I 0.044 (*)     All other components within normal limits   B-TYPE NATRIURETIC PEPTIDE - Abnormal; Notable for the following:     BNP 1947 (*)     All other components within normal limits   MAGNESIUM - Abnormal; Notable for the following:     Magnesium 1.5 (*)     All other components within normal limits   CBC W/ AUTO DIFFERENTIAL - Abnormal; Notable for the following:     RBC 4.47 (*)     Hemoglobin 12.7 (*)     Hematocrit 38.2 (*)     RDW 17.6 (*)     Platelets 126 (*)     All other components within normal limits   BASIC METABOLIC PANEL - Abnormal; Notable for the following:     Glucose 138 (*)     BUN, Bld 35 (*)     Creatinine 1.5 (*)     eGFR if  56 (*)     eGFR if non  49 (*)     All other components within normal limits   MAGNESIUM - Abnormal; Notable for the following:     Magnesium 1.5 (*)     All other components within normal limits   TROPONIN I - Abnormal; Notable for the following:     Troponin I 0.065 (*)     All other components within normal limits   TROPONIN I - Abnormal; Notable for the following:     Troponin I 0.046 (*)     All other components within normal limits   PROTIME-INR   PHOSPHORUS   PHOSPHORUS        All Lab Results:  Results for orders placed or performed during the hospital encounter of 04/19/17   CBC auto differential   Result Value Ref Range    WBC 5.19 3.90 - 12.70 K/uL    RBC 4.77 4.60 - 6.20 M/uL    Hemoglobin 13.6 (L) 14.0 - 18.0 g/dL    Hematocrit 40.8 40.0 - 54.0 %    MCV 86 82 - 98 fL    MCH 28.5 27.0 - 31.0 pg    MCHC 33.3 32.0 - 36.0 %    RDW  17.5 (H) 11.5 - 14.5 %    Platelets 121 (L) 150 - 350 K/uL    MPV SEE COMMENT 9.2 - 12.9 fL    Gran # 3.1 1.8 - 7.7 K/uL    Lymph # 1.3 1.0 - 4.8 K/uL    Mono # 0.5 0.3 - 1.0 K/uL    Eos # 0.2 0.0 - 0.5 K/uL    Baso # 0.02 0.00 - 0.20 K/uL    Gran% 60.4 38.0 - 73.0 %    Lymph% 25.0 18.0 - 48.0 %    Mono% 10.2 4.0 - 15.0 %    Eosinophil% 4.0 0.0 - 8.0 %    Basophil% 0.4 0.0 - 1.9 %    Differential Method Automated    Comprehensive metabolic panel   Result Value Ref Range    Sodium 141 136 - 145 mmol/L    Potassium 3.7 3.5 - 5.1 mmol/L    Chloride 106 95 - 110 mmol/L    CO2 27 23 - 29 mmol/L    Glucose 157 (H) 70 - 110 mg/dL    BUN, Bld 35 (H) 8 - 23 mg/dL    Creatinine 1.6 (H) 0.5 - 1.4 mg/dL    Calcium 8.9 8.7 - 10.5 mg/dL    Total Protein 7.6 6.0 - 8.4 g/dL    Albumin 2.6 (L) 3.5 - 5.2 g/dL    Total Bilirubin 1.3 (H) 0.1 - 1.0 mg/dL    Alkaline Phosphatase 108 55 - 135 U/L    AST 33 10 - 40 U/L    ALT 14 10 - 44 U/L    Anion Gap 8 8 - 16 mmol/L    eGFR if African American 52 (A) >60 mL/min/1.73 m^2    eGFR if non African American 45 (A) >60 mL/min/1.73 m^2   Troponin I   Result Value Ref Range    Troponin I 0.044 (H) 0.000 - 0.026 ng/mL   Brain natriuretic peptide   Result Value Ref Range    BNP 1947 (H) 0 - 99 pg/mL   Protime-INR   Result Value Ref Range    Prothrombin Time 11.9 9.0 - 12.5 sec    INR 1.1 0.8 - 1.2   Magnesium - if not done in ED   Result Value Ref Range    Magnesium 1.5 (L) 1.6 - 2.6 mg/dL   Phosphorus - if not done in ED   Result Value Ref Range    Phosphorus 3.3 2.7 - 4.5 mg/dL   CBC auto differential   Result Value Ref Range    WBC 5.99 3.90 - 12.70 K/uL    RBC 4.47 (L) 4.60 - 6.20 M/uL    Hemoglobin 12.7 (L) 14.0 - 18.0 g/dL    Hematocrit 38.2 (L) 40.0 - 54.0 %    MCV 86 82 - 98 fL    MCH 28.4 27.0 - 31.0 pg    MCHC 33.2 32.0 - 36.0 %    RDW 17.6 (H) 11.5 - 14.5 %    Platelets 126 (L) 150 - 350 K/uL    MPV SEE COMMENT 9.2 - 12.9 fL    Gran # 3.7 1.8 - 7.7 K/uL    Lymph # 1.6 1.0 - 4.8 K/uL     Mono # 0.5 0.3 - 1.0 K/uL    Eos # 0.2 0.0 - 0.5 K/uL    Baso # 0.01 0.00 - 0.20 K/uL    Gran% 61.6 38.0 - 73.0 %    Lymph% 27.2 18.0 - 48.0 %    Mono% 8.2 4.0 - 15.0 %    Eosinophil% 3.0 0.0 - 8.0 %    Basophil% 0.2 0.0 - 1.9 %    Platelet Estimate Appears normal     Large/Giant Platelets Present     Differential Method Automated    Basic metabolic panel    Result Value Ref Range    Sodium 141 136 - 145 mmol/L    Potassium 3.7 3.5 - 5.1 mmol/L    Chloride 105 95 - 110 mmol/L    CO2 28 23 - 29 mmol/L    Glucose 138 (H) 70 - 110 mg/dL    BUN, Bld 35 (H) 8 - 23 mg/dL    Creatinine 1.5 (H) 0.5 - 1.4 mg/dL    Calcium 8.7 8.7 - 10.5 mg/dL    Anion Gap 8 8 - 16 mmol/L    eGFR if African American 56 (A) >60 mL/min/1.73 m^2    eGFR if non African American 49 (A) >60 mL/min/1.73 m^2   Magnesium   Result Value Ref Range    Magnesium 1.5 (L) 1.6 - 2.6 mg/dL   Phosphorus   Result Value Ref Range    Phosphorus 3.5 2.7 - 4.5 mg/dL   Lipid panel   Result Value Ref Range    Cholesterol 112 (L) 120 - 199 mg/dL    Triglycerides 72 30 - 150 mg/dL    HDL 39 (L) 40 - 75 mg/dL    LDL Cholesterol 58.6 (L) 63.0 - 159.0 mg/dL    HDL/Chol Ratio 34.8 20.0 - 50.0 %    Total Cholesterol/HDL Ratio 2.9 2.0 - 5.0    Non-HDL Cholesterol 73 mg/dL   Hemoglobin A1c   Result Value Ref Range    Hemoglobin A1C 6.4 (H) 4.5 - 6.2 %    Estimated Avg Glucose 137 (H) 68 - 131 mg/dL   Troponin I   Result Value Ref Range    Troponin I 0.065 (H) 0.000 - 0.026 ng/mL   Troponin I   Result Value Ref Range    Troponin I 0.046 (H) 0.000 - 0.026 ng/mL   Troponin I   Result Value Ref Range    Troponin I 0.041 (H) 0.000 - 0.026 ng/mL   CBC auto differential   Result Value Ref Range    WBC 5.59 3.90 - 12.70 K/uL    RBC 3.91 (L) 4.60 - 6.20 M/uL    Hemoglobin 11.1 (L) 14.0 - 18.0 g/dL    Hematocrit 33.4 (L) 40.0 - 54.0 %    MCV 85 82 - 98 fL    MCH 28.4 27.0 - 31.0 pg    MCHC 33.2 32.0 - 36.0 %    RDW 17.3 (H) 11.5 - 14.5 %    Platelets 116 (L) 150 - 350 K/uL    MPV SEE  COMMENT 9.2 - 12.9 fL    Gran # 3.4 1.8 - 7.7 K/uL    Lymph # 1.5 1.0 - 4.8 K/uL    Mono # 0.6 0.3 - 1.0 K/uL    Eos # 0.1 0.0 - 0.5 K/uL    Baso # 0.01 0.00 - 0.20 K/uL    Gran% 60.8 38.0 - 73.0 %    Lymph% 26.5 18.0 - 48.0 %    Mono% 10.4 4.0 - 15.0 %    Eosinophil% 2.3 0.0 - 8.0 %    Basophil% 0.2 0.0 - 1.9 %    Differential Method Automated    Basic metabolic panel    Result Value Ref Range    Sodium 140 136 - 145 mmol/L    Potassium 3.4 (L) 3.5 - 5.1 mmol/L    Chloride 105 95 - 110 mmol/L    CO2 29 23 - 29 mmol/L    Glucose 93 70 - 110 mg/dL    BUN, Bld 36 (H) 8 - 23 mg/dL    Creatinine 1.6 (H) 0.5 - 1.4 mg/dL    Calcium 8.5 (L) 8.7 - 10.5 mg/dL    Anion Gap 6 (L) 8 - 16 mmol/L    eGFR if African American 52 (A) >60 mL/min/1.73 m^2    eGFR if non African American 45 (A) >60 mL/min/1.73 m^2   CBC auto differential   Result Value Ref Range    WBC 4.97 3.90 - 12.70 K/uL    RBC 4.23 (L) 4.60 - 6.20 M/uL    Hemoglobin 11.9 (L) 14.0 - 18.0 g/dL    Hematocrit 36.7 (L) 40.0 - 54.0 %    MCV 87 82 - 98 fL    MCH 28.1 27.0 - 31.0 pg    MCHC 32.4 32.0 - 36.0 %    RDW 17.7 (H) 11.5 - 14.5 %    Platelets 111 (L) 150 - 350 K/uL    MPV SEE COMMENT 9.2 - 12.9 fL    Gran # 3.3 1.8 - 7.7 K/uL    Lymph # 1.0 1.0 - 4.8 K/uL    Mono # 0.6 0.3 - 1.0 K/uL    Eos # 0.1 0.0 - 0.5 K/uL    Baso # 0.01 0.00 - 0.20 K/uL    Gran% 67.2 38.0 - 73.0 %    Lymph% 19.1 18.0 - 48.0 %    Mono% 11.5 4.0 - 15.0 %    Eosinophil% 2.2 0.0 - 8.0 %    Basophil% 0.2 0.0 - 1.9 %    Platelet Estimate Decreased (A)     Differential Method Automated    Basic metabolic panel    Result Value Ref Range    Sodium 140 136 - 145 mmol/L    Potassium 3.5 3.5 - 5.1 mmol/L    Chloride 104 95 - 110 mmol/L    CO2 29 23 - 29 mmol/L    Glucose 165 (H) 70 - 110 mg/dL    BUN, Bld 36 (H) 8 - 23 mg/dL    Creatinine 1.7 (H) 0.5 - 1.4 mg/dL    Calcium 8.8 8.7 - 10.5 mg/dL    Anion Gap 7 (L) 8 - 16 mmol/L    eGFR if African American 49 (A) >60 mL/min/1.73 m^2    eGFR if non African  American 42 (A) >60 mL/min/1.73 m^2   Brain natriuretic peptide   Result Value Ref Range     (H) 0 - 99 pg/mL   Brain natriuretic peptide   Result Value Ref Range     (H) 0 - 99 pg/mL   POCT glucose   Result Value Ref Range    POCT Glucose 170 (H) 70 - 110 mg/dL   POCT glucose   Result Value Ref Range    POCT Glucose 127 (H) 70 - 110 mg/dL   POCT glucose   Result Value Ref Range    POCT Glucose 129 (H) 70 - 110 mg/dL   POCT glucose   Result Value Ref Range    POCT Glucose 35 (LL) 70 - 110 mg/dL   POCT glucose   Result Value Ref Range    POCT Glucose 35 (LL) 70 - 110 mg/dL   POCT glucose   Result Value Ref Range    POCT Glucose 29 (LL) 70 - 110 mg/dL   POCT glucose   Result Value Ref Range    POCT Glucose 188 (H) 70 - 110 mg/dL   POCT glucose   Result Value Ref Range    POCT Glucose 184 (H) 70 - 110 mg/dL         Imaging Results:  Imaging Results         X-Ray Chest AP Portable (Final result) Result time:  04/19/17 21:59:03    Final result by Javad Henry Jr., MD (04/19/17 21:59:03)    Impression:     No acute cardiopulmonary disease.  Right lower lobe atelectasis versus infiltrate.      Electronically signed by: JAVAD HENRY  Date:     04/19/17  Time:    21:59     Narrative:    Exam: Portable chest radiograph    History:    Shortness of breath.    Comparison: 4/6/2017.    Findings: Persistent mild hazy opacification in the right lower lobe.  Remaining lungs are clear.  The cardiac silhouette remains enlarged.  Asymmetric are unchanged.  No pneumothorax.             The EKG was ordered, reviewed, and independently interpreted by the ED provider.  Interpretation time: 2145  Rate: 88 BPM  Rhythm: Atrial-sensed ventricular-paced rhythm  Interpretation: Biventricular pacemaker detected. Abnormal  ECG. No STEMI.              The Emergency Provider reviewed the vital signs and test results, which are outlined above.    ED Discussion     11:05 PM: Dr. Lutz discussed the pt's case with Dr. Still  (Hospital Medicine) who recommends admission.    11:08 PM: Discussed case with Dr. Still ([facility] Medicine). Dr. Still agrees with current care and management of pt and accepts admission.   Admitting Service: Hospital medicine   Admitting Physician: Dr. Still  Admit to: Tele In-patient      11:26 PM: Re-evaluated pt. I have discussed test results, shared treatment plan, and the need for admission with patient and family at bedside. Pt and family express understanding at this time and agree with all information. All questions answered. Pt and family have no further questions or concerns at this time. Pt is ready for admit.      ED Medication(s):  Medications   furosemide injection 80 mg (80 mg Intravenous Given 4/19/17 2204)   nitroGLYCERIN 2% TD oint ointment 1 inch (1 inch Topical Given 4/19/17 2204)   furosemide injection 40 mg (40 mg Intravenous Given 4/21/17 0545)   metOLazone tablet 5 mg (5 mg Oral Given 4/21/17 1500)   metOLazone tablet 5 mg (5 mg Oral Given 4/22/17 1032)       Discharge Medication List as of 4/22/2017  3:41 PM          Follow-up Information     Follow up with Sandra Estevez MD In 1 week.    Specialty:  Family Medicine    Contact information:    [address]  [place] [address] [phone]              Medical Decision Making    Medical Decision Making:   Clinical Tests:   Lab Tests: Ordered and Reviewed  Radiological Study: Ordered and Reviewed  Medical Tests: Ordered and Reviewed           Scribe Attestation:   Scribe #1: I performed the above scribed service and the documentation accurately describes the services I performed. I attest to the accuracy of the note.    Attending:   Physician Attestation Statement for Scribe #1: I, Gaurav Lutz MD, personally performed the services described in this documentation, as scribed by Corinne Mack, in my presence, and it is both accurate and complete.          Clinical Impression       ICD-10-CM ICD-9-CM   1. Acute on chronic combined  systolic and diastolic congestive heart failure I50.43 428.43     428.0   2. Type 2 diabetes mellitus with stage 3 chronic kidney disease, with long-term current use of insulin E11.22 250.40    N18.3 585.3    Z79.4 V58.67   3. JAHAIRA on CPAP G47.33 327.23    Z99.89 V46.8   4. Colostomy in place Z93.3 V44.3   5. Peripheral edema R60.9 782.3   6. CHF (congestive heart failure) I50.9 428.0       Disposition:   Disposition: Admitted  Condition: Ananth Lutz MD  05/04/17 4412

## 2017-04-20 NOTE — PROGRESS NOTES
Diabetes Education  Author: Otilia Prado RD, CDE  Date: 4/20/2017    Diabetes Education Visit  Diabetes Education Record Assessment/Progress: Comprehensive/Group    Diabetes Type  Diabetes Type : Type II    Diabetes History  Diabetes Diagnosis: >10 years (T2DM for 21 years )    Nutrition  Meal Planning:  (Per recall, intake ~7340-6001 cals/d w/ excess carb, fat, sodium)  Meal Plan 24 Hour Recall - Breakfast: hot chetan (regular)  Meal Plan 24 Hour Recall - Lunch: glucerna   Meal Plan 24 Hour Recall - Dinner: crawfish & sausage ettoufee, rice and cucumbers   Meal Plan 24 Hour Recall - Snack: cookies, ritz crackers, jose crackers, fruit, jello; anaid:milk, coke, koolaid, sf      Monitoring   Self Monitoring : no blood sugar testing since 8/16.   Blood Glucose Logs: No    Exercise   Frequency: Never    Current Diabetes Treatment   Current Treatment: Diet, Exercise, Insulin    Social History  Preferred Learning Method: Face to Face  Primary Support: Self, Spouse  Occupation: . Lives with spouse. Has 2 children. 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  Smoking Status: Never a Smoker  Alcohol Use: Rarely                             Barriers to Change  Barriers to Change: None  Learning Challenges : None    Readiness to Learn   Readiness to Learn : Non Acceptance    Cultural Influences  Cultural Influences: No    Diabetes Education Assessment/Progress  Acute Complications (preventing, detecting, and treating acute complications): Discussion, Competent (verbalizes/demonstrates), Individual Session, Competent Family/SO, Written Materials Provided  Chronic Complications (preventing, detecting, and treating chronic complications): Competent (verbalizes/demonstrates), Competent Family/SO, Discussion, Individual Session  Diabetes Disease Process (diabetes disease process and treatment options): Discussion, Competent (verbalizes/demonstrates), Competent Family/SO, Individual  Session  Nutrition (Incorporating nutritional management into one's lifestyle): Discussion, Requires Assistance, Competent Family/SO, Competent (verbalizes/demonstrates), Individual Session, Written Materials Provided  Physical Activity (incorporating physical activity into one's lifestyle): Discussion, Requires Assistance, Competent Family/SO, Competent (verbalizes/demonstrates), Individual Session  Medications (states correct name, dose, onset, peak, duration, side effects & timing of meds): Written Materials Provided, Discussion, Competent (verbalizes/demonstrates), Competent Family/SO, Requires Assistance, Individual Session  Monitoring (monitoring blood glucose/other parameters & using results): Discussion, Competent Family/SO, Competent (verbalizes/demonstrates), Requires Assistance, Written Materials Provided, Individual Session, Demonstration  Goal Setting and Problem Solving (verbalizes behavior change strategies & sets realistic goals): Discussion, Competent (verbalizes/demonstrates), Competent Family/SO, Requires Assistance, Individual Session  Behavior Change (developing personal strategies to health & behavior change): Discussion, Competent Family/SO, Comnpetent (verbalizes/demonstrates), Requires Assistance, Individual Session  Psychosocial Issues (developing personal srategies to address psychosocial concerns): Discussion, Competent (verbalizes/demonstrates), Competent Family/SO, Requires Assostance, Individual Session    Goals  Monitoring: Set  Start Date: 04/19/17 (test bg 2-4x/d - fasting, ac)  Target Date: 07/20/17         Diabetes Care Plan/Intervention  Education Plan/Intervention: Individual Follow-Up DSMT, Endocrine Provider Visit Set Up    Diabetes Meal Plan  Restrictions: Low Fat, Low Sodium  Calories: 1600, 1800  Carbohydrate Per Meal: 45-60g  Carbohydrate Per Snack : 15-20g, 15-30g    Education Units of Time   Time Spent: 30 min      Health Maintenance Topics with due status: Not Due        Topic Last Completion Date    Pneumococcal PPSV23 (Medium Risk) 02/05/2014    Colonoscopy 01/06/2015    TETANUS VACCINE 10/16/2015    PROSTATE-SPECIFIC ANTIGEN 11/17/2016    Foot Exam 11/17/2016    Lipid Panel 11/17/2016    Hemoglobin A1c 11/17/2016    Eye Exam 01/23/2017     Health Maintenance Due   Topic Date Due    Zoster Vaccine  05/12/2013

## 2017-04-21 LAB
ANION GAP SERPL CALC-SCNC: 6 MMOL/L
BASOPHILS # BLD AUTO: 0.01 K/UL
BASOPHILS NFR BLD: 0.2 %
BUN SERPL-MCNC: 36 MG/DL
CALCIUM SERPL-MCNC: 8.5 MG/DL
CHLORIDE SERPL-SCNC: 105 MMOL/L
CO2 SERPL-SCNC: 29 MMOL/L
CREAT SERPL-MCNC: 1.6 MG/DL
DIFFERENTIAL METHOD: ABNORMAL
EOSINOPHIL # BLD AUTO: 0.1 K/UL
EOSINOPHIL NFR BLD: 2.3 %
ERYTHROCYTE [DISTWIDTH] IN BLOOD BY AUTOMATED COUNT: 17.3 %
EST. GFR  (AFRICAN AMERICAN): 52 ML/MIN/1.73 M^2
EST. GFR  (NON AFRICAN AMERICAN): 45 ML/MIN/1.73 M^2
ESTIMATED AVG GLUCOSE: 137 MG/DL
GLUCOSE SERPL-MCNC: 93 MG/DL
HBA1C MFR BLD HPLC: 6.4 %
HCT VFR BLD AUTO: 33.4 %
HGB BLD-MCNC: 11.1 G/DL
LYMPHOCYTES # BLD AUTO: 1.5 K/UL
LYMPHOCYTES NFR BLD: 26.5 %
MCH RBC QN AUTO: 28.4 PG
MCHC RBC AUTO-ENTMCNC: 33.2 %
MCV RBC AUTO: 85 FL
MONOCYTES # BLD AUTO: 0.6 K/UL
MONOCYTES NFR BLD: 10.4 %
NEUTROPHILS # BLD AUTO: 3.4 K/UL
NEUTROPHILS NFR BLD: 60.8 %
PLATELET # BLD AUTO: 116 K/UL
PMV BLD AUTO: ABNORMAL FL
POCT GLUCOSE: 127 MG/DL (ref 70–110)
POCT GLUCOSE: 129 MG/DL (ref 70–110)
POCT GLUCOSE: 170 MG/DL (ref 70–110)
POTASSIUM SERPL-SCNC: 3.4 MMOL/L
RBC # BLD AUTO: 3.91 M/UL
SODIUM SERPL-SCNC: 140 MMOL/L
WBC # BLD AUTO: 5.59 K/UL

## 2017-04-21 PROCEDURE — 25000003 PHARM REV CODE 250: Performed by: INTERNAL MEDICINE

## 2017-04-21 PROCEDURE — 21400001 HC TELEMETRY ROOM

## 2017-04-21 PROCEDURE — 25000003 PHARM REV CODE 250: Performed by: EMERGENCY MEDICINE

## 2017-04-21 PROCEDURE — 85025 COMPLETE CBC W/AUTO DIFF WBC: CPT

## 2017-04-21 PROCEDURE — 63600175 PHARM REV CODE 636 W HCPCS: Performed by: NURSE PRACTITIONER

## 2017-04-21 PROCEDURE — 99232 SBSQ HOSP IP/OBS MODERATE 35: CPT | Mod: ,,, | Performed by: INTERNAL MEDICINE

## 2017-04-21 PROCEDURE — 93010 ELECTROCARDIOGRAM REPORT: CPT | Mod: ,,, | Performed by: INTERNAL MEDICINE

## 2017-04-21 PROCEDURE — 93005 ELECTROCARDIOGRAM TRACING: CPT

## 2017-04-21 PROCEDURE — 80048 BASIC METABOLIC PNL TOTAL CA: CPT

## 2017-04-21 PROCEDURE — 63600175 PHARM REV CODE 636 W HCPCS: Performed by: INTERNAL MEDICINE

## 2017-04-21 PROCEDURE — 36415 COLL VENOUS BLD VENIPUNCTURE: CPT

## 2017-04-21 PROCEDURE — 25000003 PHARM REV CODE 250: Performed by: NURSE PRACTITIONER

## 2017-04-21 RX ORDER — POTASSIUM CHLORIDE 20 MEQ/1
20 TABLET, EXTENDED RELEASE ORAL DAILY
Status: DISCONTINUED | OUTPATIENT
Start: 2017-04-21 | End: 2017-04-22 | Stop reason: HOSPADM

## 2017-04-21 RX ORDER — INSULIN ASPART 100 [IU]/ML
0-5 INJECTION, SOLUTION INTRAVENOUS; SUBCUTANEOUS
Status: DISCONTINUED | OUTPATIENT
Start: 2017-04-21 | End: 2017-04-22 | Stop reason: HOSPADM

## 2017-04-21 RX ORDER — GLUCAGON 1 MG
1 KIT INJECTION
Status: DISCONTINUED | OUTPATIENT
Start: 2017-04-21 | End: 2017-04-22 | Stop reason: HOSPADM

## 2017-04-21 RX ORDER — METOLAZONE 5 MG/1
5 TABLET ORAL ONCE
Status: COMPLETED | OUTPATIENT
Start: 2017-04-21 | End: 2017-04-21

## 2017-04-21 RX ORDER — IBUPROFEN 200 MG
24 TABLET ORAL
Status: DISCONTINUED | OUTPATIENT
Start: 2017-04-21 | End: 2017-04-22 | Stop reason: HOSPADM

## 2017-04-21 RX ORDER — FUROSEMIDE 10 MG/ML
40 INJECTION INTRAMUSCULAR; INTRAVENOUS 2 TIMES DAILY
Status: DISCONTINUED | OUTPATIENT
Start: 2017-04-21 | End: 2017-04-22 | Stop reason: HOSPADM

## 2017-04-21 RX ORDER — IBUPROFEN 200 MG
16 TABLET ORAL
Status: DISCONTINUED | OUTPATIENT
Start: 2017-04-21 | End: 2017-04-22 | Stop reason: HOSPADM

## 2017-04-21 RX ADMIN — FUROSEMIDE 40 MG: 10 INJECTION, SOLUTION INTRAMUSCULAR; INTRAVENOUS at 03:04

## 2017-04-21 RX ADMIN — ENOXAPARIN SODIUM 40 MG: 100 INJECTION SUBCUTANEOUS at 06:04

## 2017-04-21 RX ADMIN — ASPIRIN 81 MG: 81 TABLET, COATED ORAL at 09:04

## 2017-04-21 RX ADMIN — INSULIN DETEMIR 40 UNITS: 100 INJECTION, SOLUTION SUBCUTANEOUS at 08:04

## 2017-04-21 RX ADMIN — ALLOPURINOL 100 MG: 100 TABLET ORAL at 09:04

## 2017-04-21 RX ADMIN — PANTOPRAZOLE SODIUM 40 MG: 40 TABLET, DELAYED RELEASE ORAL at 09:04

## 2017-04-21 RX ADMIN — LISINOPRIL 5 MG: 5 TABLET ORAL at 09:04

## 2017-04-21 RX ADMIN — SIMVASTATIN 10 MG: 5 TABLET, FILM COATED ORAL at 09:04

## 2017-04-21 RX ADMIN — METOLAZONE 5 MG: 5 TABLET ORAL at 03:04

## 2017-04-21 RX ADMIN — POTASSIUM CHLORIDE 20 MEQ: 1500 TABLET, EXTENDED RELEASE ORAL at 03:04

## 2017-04-21 RX ADMIN — INSULIN DETEMIR 40 UNITS: 100 INJECTION, SOLUTION SUBCUTANEOUS at 09:04

## 2017-04-21 RX ADMIN — SODIUM CHLORIDE, PRESERVATIVE FREE 3 ML: 5 INJECTION INTRAVENOUS at 03:04

## 2017-04-21 RX ADMIN — SODIUM CHLORIDE, PRESERVATIVE FREE 3 ML: 5 INJECTION INTRAVENOUS at 05:04

## 2017-04-21 RX ADMIN — SODIUM CHLORIDE, PRESERVATIVE FREE 3 ML: 5 INJECTION INTRAVENOUS at 09:04

## 2017-04-21 RX ADMIN — CARVEDILOL 12.5 MG: 12.5 TABLET, FILM COATED ORAL at 07:04

## 2017-04-21 RX ADMIN — CARVEDILOL 12.5 MG: 12.5 TABLET, FILM COATED ORAL at 06:04

## 2017-04-21 RX ADMIN — FUROSEMIDE 40 MG: 10 INJECTION, SOLUTION INTRAMUSCULAR; INTRAVENOUS at 05:04

## 2017-04-21 NOTE — PLAN OF CARE
Problem: Patient Care Overview  Goal: Plan of Care Review  Outcome: Ongoing (interventions implemented as appropriate)  Patient awake, alert, oriented. Respirations even and unlabored. Patient on 3L of oxygen via nasal canula. Family at bedside. Patient remained free of falls on this shift.  No skin breakdown noted.  PIV clean, dry and intact. Patient denies pain at this time.  Room free of clutter. Bed locked and in lowest position. Call light within reach.  Safety precautions in place. Side rails up x2. Patient on telemetry monitor. Sinus rhythm on the monitor. POC reviewed. No concerns voiced at this time. Will continue to monitor patient.

## 2017-04-21 NOTE — PROGRESS NOTES
Cardiology Progress Note        SUBJECTIVE:   Patient diuresing well with Lasix 40mg TID. He has had improvement in his symptoms , but still symptomatic. Labs and vitals stable. Still has edema and SOB. Denies any chest pain. EF 30% on recent echo.       OBJECTIVE:     Vital Signs (Most Recent)  Temp: 97.6 °F (36.4 °C) (04/21/17 1236)  Pulse: 72 (04/21/17 1236)  Resp: 18 (04/21/17 1236)  BP: 129/75 (04/21/17 1236)  SpO2: 99 % (04/21/17 1300)    Vital Signs Range (Last 24H):  Temp:  [97.6 °F (36.4 °C)-99 °F (37.2 °C)]   Pulse:  [72-85]   Resp:  [18-20]   BP: (105-138)/(58-79)   SpO2:  [96 %-100 %]     Intake/Output last 3 shifts:  I/O last 3 completed shifts:  In: 203 [P.O.:200; I.V.:3]  Out: 2170 [Urine:2170]    Intake/Output this shift:       Review of patient's allergies indicates:  No Known Allergies    Current Facility-Administered Medications   Medication    acetaminophen tablet 650 mg    albuterol-ipratropium 2.5mg-0.5mg/3mL nebulizer solution 3 mL    allopurinol tablet 100 mg    aluminum-magnesium hydroxide-simethicone 200-200-20 mg/5 mL suspension 30 mL    aspirin EC tablet 81 mg    carvedilol tablet 12.5 mg    diphenhydrAMINE capsule 25 mg    enoxaparin injection 40 mg    furosemide injection 40 mg    hydrALAZINE injection 10 mg    insulin detemir pen 40 Units    lisinopril tablet 5 mg    metOLazone tablet 5 mg    ondansetron injection 4 mg    oxycodone-acetaminophen 7.5-325 mg per tablet 1 tablet    pantoprazole EC tablet 40 mg    potassium chloride SA CR tablet 20 mEq    promethazine (PHENERGAN) 6.25 mg in dextrose 5 % 50 mL IVPB    simvastatin tablet 10 mg    sodium chloride 0.9% injection 3 mL     No current facility-administered medications on file prior to encounter.      Current Outpatient Prescriptions on File Prior to Encounter   Medication Sig    allopurinol (ZYLOPRIM) 100 MG tablet TAKE 1 TABLET (100 MG TOTAL) BY MOUTH ONCE DAILY.    aspirin (ECOTRIN) 81 MG EC tablet Take 81  "mg by mouth once daily.    bumetanide (BUMEX) 2 MG tablet TAKE 1 TABLET (2 MG TOTAL) BY MOUTH 2 (TWO) TIMES DAILY.    carvedilol (COREG) 25 MG tablet Take 12.5 mg by mouth 2 (two) times daily with meals.    guaifenesin (MUCINEX) 600 mg 12 hr tablet Take 1,200 mg by mouth 2 (two) times daily as needed.     lisinopril (PRINIVIL,ZESTRIL) 5 MG tablet Take 1 tablet (5 mg total) by mouth once daily.    pantoprazole (PROTONIX) 40 MG tablet TAKE 1 TABLET BY MOUTH EVERY DAY FOR ACID REFLUX    potassium chloride SA (K-DUR,KLOR-CON) 10 MEQ tablet TAKE 1 TABLET BY MOUTH EVERY DAY    simvastatin (ZOCOR) 10 MG tablet TAKE 1 TABLET BY MOUTH EVERY EVENING FOR CHOLESTEROL    BD INSULIN SYRINGE ULTRA-FINE 1/2 mL 31 x 5/16" Syrg USE AS DIRECTED TO INJECT INSULIN TWO TIMES DAILY    BD INSULIN SYRINGE ULTRA-FINE 1/2 mL 31 x 5/16" Syrg USE AS DIRECTED TO INJECT INSULIN TWO TIMES DAILY    insulin glargine (LANTUS) 100 unit/mL injection 50 units bid    insulin needles, disposable, (BD ULTRA-FINE JOSLYN PEN NEEDLES) 32 x 5/32 " Ndle 1 each by Misc.(Non-Drug; Combo Route) route 4 (four) times daily.    IRON/VITAMIN B COMPLEX (GERITOL ORAL) Take by mouth.       Physical Exam:  General appearance: alert, appears stated age and cooperative  Head: Normocephalic, without obvious abnormality, atraumatic  Neck: no adenopathy, no carotid bruit, +JVD  Lungs: crackles to auscultation bilaterally  Chest wall: no tenderness  Heart: regular rate and rhythm, S1, S2 normal, no murmur, click, rub or gallop  Abdomen: soft, non-tender; bowel sounds normal; no masses,  no organomegaly.  LLQ colostomy   Extremities: extremities normal, atraumatic, no cyanosis. +2 BLE edema  Pulses: 2+ and symmetric  Skin: Skin color, texture, turgor normal. No rashes or lesions  Neurologic: Grossly normal    Laboratory:  Chemistry:   Lab Results   Component Value Date     04/21/2017    K 3.4 (L) 04/21/2017     04/21/2017    CO2 29 04/21/2017    BUN 36 (H) " 04/21/2017    CREATININE 1.6 (H) 04/21/2017    CALCIUM 8.5 (L) 04/21/2017     Cardiac Markers:   Lab Results   Component Value Date    TROPONINI 0.041 (H) 04/20/2017     Cardiac Markers (Last 3):   Lab Results   Component Value Date    TROPONINI 0.041 (H) 04/20/2017    TROPONINI 0.046 (H) 04/20/2017    TROPONINI 0.065 (H) 04/20/2017     CBC:   Lab Results   Component Value Date    WBC 5.59 04/21/2017    HGB 11.1 (L) 04/21/2017    HCT 33.4 (L) 04/21/2017    MCV 85 04/21/2017     (L) 04/21/2017     Lipids:   Lab Results   Component Value Date    CHOL 112 (L) 04/20/2017    TRIG 72 04/20/2017    HDL 39 (L) 04/20/2017     Coagulation:   Lab Results   Component Value Date    INR 1.1 04/19/2017    APTT 30.6 03/22/2016       Diagnostic Results:  ECG: Reviewed  X-Ray: Reviewed  Echo: Reviewed      ASSESSMENT/PLAN:     Patient Active Problem List   Diagnosis    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    Hypertension associated with diabetes    Stage 3 chronic kidney disease    ED (erectile dysfunction)    Ischemic cardiomyopathy    Pulmonary HTN    Hyperlipidemia associated with type 2 diabetes mellitus    Loss of eye - Right Eye    LBBB (left bundle branch block)    Abnormal stress test    Cysts of eyelids    CAD, multiple vessel    Primary open-angle glaucoma, moderate stage    Chronic combined systolic and diastolic heart failure    CHF NYHA class II    Biventricular ICD (implantable cardioverter-defibrillator) in place    Anemia    Colostomy in place    Type 2 diabetes mellitus with stage 3 chronic kidney disease, with long-term current use of insulin    JAHAIRA on CPAP    Gastroesophageal reflux disease    Acute on chronic combined systolic and diastolic congestive heart failure        Plan:   Patient still needing to be diuresed. Recommend restarting IV Lasix at 40mg BID. Will give 1 dose of Metolazone to see how he responds. Will start KCL 20meq daily while he is  diuresing. Labs in the morning. Dobutamine gtt not needed at this time.     Chart reviewed. Patient examined by Dr. Hannon and agrees with plan that has been outlined.

## 2017-04-21 NOTE — PROGRESS NOTES
"[facility] Hospital Medicine  Progress Note    Patient Name: Yan Choudhury  MRN: [id number]  Patient Class: IP- Inpatient   Admission Date: 4/19/2017  Length of Stay: 2 days  Attending Physician: Bradly Still MD  Primary Care Provider: Sandra Estevez MD        Subjective:     Principal Problem:Acute on chronic combined systolic and diastolic congestive heart failure    HPI:  Mr. Choudhury is a 61 year old AA male with h/o CHF, CAD/ICM (s/p C 2009, diffuse CAD), DCM, LBBB, HTN, CKD3, T2DM, dyslipidemia, and obesity was sent from cardiology clinic for admission acute CHF exacerbation. Patient admits to dietary indiscretion over the Easter weekend.  He traveled out of town for the Easter holiday and indulged in crawfish, ochoa, and fried fish. Takes Bumex 2 mg po BID with no significant relief. He states he feels he is SOB even when speaking. His weight has also increased by 16 pounds. Patient denies any PND or orthopnea but wife reports he has nocturnal "gagging" and "coughing" while he is lying flat. Lower extremity edema is also increased. Patient reports compliance with his medications.    Hospital Course:       Interval History: 60 yo AAM with known CHF admitted with acute decompensation secondary to dietary noncompliance. He is feeling better today but feels as if could benefit from another day of IV lasix. Denies any CP    Review of Systems   Constitutional: Negative.    HENT: Negative.    Respiratory: Negative.    Cardiovascular: Positive for leg swelling.   Gastrointestinal: Negative.    All other systems reviewed and are negative.    Objective:     Vital Signs (Most Recent):  Temp: 97.6 °F (36.4 °C) (04/21/17 1236)  Pulse: 72 (04/21/17 1236)  Resp: 18 (04/21/17 1236)  BP: 129/75 (04/21/17 1236)  SpO2: 99 % (04/21/17 1300) Vital Signs (24h Range):  Temp:  [97.6 °F (36.4 °C)-99 °F (37.2 °C)] 97.6 °F (36.4 °C)  Pulse:  [72-85] 72  Resp:  [18-20] 18  SpO2:  [96 %-100 %] 99 %  BP: (105-138)/(58-79) " 129/75     Weight: 96.2 kg (212 lb)  Body mass index is 32.23 kg/(m^2).  No intake or output data in the 24 hours ending 04/21/17 1505   Physical Exam   Constitutional: He is oriented to person, place, and time. He appears well-developed and well-nourished.   HENT:   Head: Normocephalic and atraumatic.   Nose: Nose normal.   Mouth/Throat: Oropharynx is clear and moist.   Eyes: Conjunctivae and EOM are normal. Pupils are equal, round, and reactive to light.   Neck: Neck supple.   Cardiovascular: Normal rate, regular rhythm, normal heart sounds and intact distal pulses.    Pulmonary/Chest: Effort normal and breath sounds normal.   Abdominal: Bowel sounds are normal.   Musculoskeletal: He exhibits edema.   Neurological: He is alert and oriented to person, place, and time.   Skin: Skin is warm and dry.       Significant Labs:   BMP:   Recent Labs  Lab 04/20/17  0400 04/21/17  0528   * 93    140   K 3.7 3.4*    105   CO2 28 29   BUN 35* 36*   CREATININE 1.5* 1.6*   CALCIUM 8.7 8.5*   MG 1.5*  --        Significant Imaging: I have reviewed and interpreted all pertinent imaging results/findings within the past 24 hours.    Assessment/Plan:      * Acute on chronic combined systolic and diastolic congestive heart failure  - Acute exacerbation due to dietary indiscretion  - Echo from 4/2016 reviewed, EF 30% with diastolic dysfunction  - Repeat Echo in AM  - Lasix 40 mg IV TID x4 doses  - Cardiology consult  - Monitor UOP  -     Patient is feeling a little better still with 3+ pitting edema, f/u echo if not ordered then repeat, continue diuresing.    CAD, multiple vessel  - Resume home medications    F/u CE, to rule out ACS as cause of decompensation of his CHF      Biventricular ICD (implantable cardioverter-defibrillator) in place  stable      Type 2 diabetes mellitus with stage 3 chronic kidney disease, with long-term current use of insulin  - Resume home insulin regimen, at lower dose  - ISS   continue  SSI    VTE Risk Mitigation         Ordered     enoxaparin injection 40 mg  Daily     Route:  Subcutaneous        04/20/17 0146     Medium Risk of VTE  Once      04/20/17 0146     Place sequential compression device  Until discontinued      04/20/17 0146          Alessia Huerta MD  Department of Hospital Medicine   [facility]

## 2017-04-21 NOTE — PLAN OF CARE
Met with patient/spouse at the bedside to assess for discharge needs.  Patient states that he is independent and does not anticipate any discharge needs.  CM provided contact information for any needs/questions.     04/21/17 7018   Discharge Assessment   Assessment Type Discharge Planning Assessment   Confirmed/corrected address and phone number on facesheet? Yes   Assessment information obtained from? Patient;Caregiver;Medical Record   Expected Length of Stay (days) (1-2)   Communicated expected length of stay with patient/caregiver yes   Prior to hospitilization cognitive status: Alert/Oriented   Prior to hospitalization functional status: Independent   Current cognitive status: Alert/Oriented   Current Functional Status: Independent   Arrived From admitted as an inpatient;home or self-care   Lives With spouse   Able to Return to Prior Arrangements yes   Is patient able to care for self after discharge? Yes   How many people do you have in your home that can help with your care after discharge? 1   Who are your caregiver(s) and their phone number(s)? Jessica Choudhury, spouse [phone]   Patient's perception of discharge disposition admitted as an inpatient;home or selfcare   Readmission Within The Last 30 Days no previous admission in last 30 days   Patient currently being followed by outpatient case management? No   Patient currently receives home health services? No   Does the patient currently use HME? Yes   Patient currently receives private duty nursing? No   Patient currently receives any other outside agency services? No   Equipment Currently Used at Home glucometer;cane, straight   Do you have any problems affording any of your prescribed medications? No   Is the patient taking medications as prescribed? yes   Do you have any financial concerns preventing you from receiving the healthcare you need? No   Does the patient have transportation to healthcare appointments? Yes   Transportation Available  car;family or friend will provide   On Dialysis? No   Does the patient receive services at the Coumadin Clinic? No   Are there any open cases? No   Discharge Plan A Home with family   Discharge Plan B Home with family   Patient/Family In Agreement With Plan yes

## 2017-04-21 NOTE — CONSULTS
"   04/21/17 0902   WOCN Assessment   WOCN Total Time (mins) 30   Visit Date 04/21/17   Visit Time 0830   Consult Type New   WOCN Speciality Ostomy   WOCN List colostomy   Continence Type Fecal   Ostomy Type Colostomy   Intervention assessed   Teaching on-going;complication;discharge     Consult received for colostomy care on  this 60 y/o M patient with PHM colostomy, CKD, CAD, ICD, DM, Pulmonary HTN, and CHF, currently admitted for CHF exacerbation. Patient 's family at bedside. Patient states he has had his colostomy for about 2 years and manages it independently at home. He denies sweta stomal skin breakdown and denies leaking appliance.  He states he brought some supplies from home for his ostomy and will be able to change the appliance himself if needed.  Informed patient of the colostomy supplies available at this hospital if needed and that his bedside RN can obtain them from [facility].  Patient denies any needs associated with his colostomy at this time.  Informed him to notify his bedside nurse to reconsult wound care if that changes.      Please see order "Stoma Care" regarding care of ostomy  Please review Stevo's procedure "Pouching : Colostomy or Ileostomy" for instructions on ostomy/stoma care.  Change ostomy appliance weekly or IMMEDIATELY IF LEAKING  All ostomy supplies can be requested from materials management  OSTOMY CARE SUPPLIES :   One Piece Appliance # [id number]       Adhesive Spray #[id number]  Paste #[id number]                                Odor Eliminator #[id number]                Wipes #[id number]                             Soft Squeeze Bottle # [id number]    "

## 2017-04-21 NOTE — SUBJECTIVE & OBJECTIVE
Interval History: 60 yo AAM with known CHF admitted with acute decompensation secondary to dietary noncompliance. He is feeling better today but feels as if could benefit from another day of IV lasix. Denies any CP    Review of Systems   Constitutional: Negative.    HENT: Negative.    Respiratory: Negative.    Cardiovascular: Positive for leg swelling.   Gastrointestinal: Negative.    All other systems reviewed and are negative.    Objective:     Vital Signs (Most Recent):  Temp: 97.6 °F (36.4 °C) (04/21/17 1236)  Pulse: 72 (04/21/17 1236)  Resp: 18 (04/21/17 1236)  BP: 129/75 (04/21/17 1236)  SpO2: 99 % (04/21/17 1300) Vital Signs (24h Range):  Temp:  [97.6 °F (36.4 °C)-99 °F (37.2 °C)] 97.6 °F (36.4 °C)  Pulse:  [72-85] 72  Resp:  [18-20] 18  SpO2:  [96 %-100 %] 99 %  BP: (105-138)/(58-79) 129/75     Weight: 96.2 kg (212 lb)  Body mass index is 32.23 kg/(m^2).  No intake or output data in the 24 hours ending 04/21/17 1505   Physical Exam   Constitutional: He is oriented to person, place, and time. He appears well-developed and well-nourished.   HENT:   Head: Normocephalic and atraumatic.   Nose: Nose normal.   Mouth/Throat: Oropharynx is clear and moist.   Eyes: Conjunctivae and EOM are normal. Pupils are equal, round, and reactive to light.   Neck: Neck supple.   Cardiovascular: Normal rate, regular rhythm, normal heart sounds and intact distal pulses.    Pulmonary/Chest: Effort normal and breath sounds normal.   Abdominal: Bowel sounds are normal.   Musculoskeletal: He exhibits edema.   Neurological: He is alert and oriented to person, place, and time.   Skin: Skin is warm and dry.       Significant Labs:   BMP:   Recent Labs  Lab 04/20/17  0400 04/21/17  0528   * 93    140   K 3.7 3.4*    105   CO2 28 29   BUN 35* 36*   CREATININE 1.5* 1.6*   CALCIUM 8.7 8.5*   MG 1.5*  --        Significant Imaging: I have reviewed and interpreted all pertinent imaging results/findings within the past 24  hours.

## 2017-04-22 VITALS
BODY MASS INDEX: 32.07 KG/M2 | SYSTOLIC BLOOD PRESSURE: 128 MMHG | OXYGEN SATURATION: 98 % | HEIGHT: 68 IN | HEART RATE: 78 BPM | DIASTOLIC BLOOD PRESSURE: 74 MMHG | RESPIRATION RATE: 18 BRPM | TEMPERATURE: 98 F | WEIGHT: 211.63 LBS

## 2017-04-22 LAB
ANION GAP SERPL CALC-SCNC: 7 MMOL/L
BASOPHILS # BLD AUTO: 0.01 K/UL
BASOPHILS NFR BLD: 0.2 %
BNP SERPL-MCNC: 824 PG/ML
BNP SERPL-MCNC: 824 PG/ML
BUN SERPL-MCNC: 36 MG/DL
CALCIUM SERPL-MCNC: 8.8 MG/DL
CHLORIDE SERPL-SCNC: 104 MMOL/L
CO2 SERPL-SCNC: 29 MMOL/L
CREAT SERPL-MCNC: 1.7 MG/DL
DIFFERENTIAL METHOD: ABNORMAL
EOSINOPHIL # BLD AUTO: 0.1 K/UL
EOSINOPHIL NFR BLD: 2.2 %
ERYTHROCYTE [DISTWIDTH] IN BLOOD BY AUTOMATED COUNT: 17.7 %
EST. GFR  (AFRICAN AMERICAN): 49 ML/MIN/1.73 M^2
EST. GFR  (NON AFRICAN AMERICAN): 42 ML/MIN/1.73 M^2
GLUCOSE SERPL-MCNC: 165 MG/DL
HCT VFR BLD AUTO: 36.7 %
HGB BLD-MCNC: 11.9 G/DL
LYMPHOCYTES # BLD AUTO: 1 K/UL
LYMPHOCYTES NFR BLD: 19.1 %
MCH RBC QN AUTO: 28.1 PG
MCHC RBC AUTO-ENTMCNC: 32.4 %
MCV RBC AUTO: 87 FL
MONOCYTES # BLD AUTO: 0.6 K/UL
MONOCYTES NFR BLD: 11.5 %
NEUTROPHILS # BLD AUTO: 3.3 K/UL
NEUTROPHILS NFR BLD: 67.2 %
PLATELET # BLD AUTO: 111 K/UL
PLATELET BLD QL SMEAR: ABNORMAL
PMV BLD AUTO: ABNORMAL FL
POCT GLUCOSE: 184 MG/DL (ref 70–110)
POCT GLUCOSE: 188 MG/DL (ref 70–110)
POCT GLUCOSE: 29 MG/DL (ref 70–110)
POCT GLUCOSE: 35 MG/DL (ref 70–110)
POCT GLUCOSE: 35 MG/DL (ref 70–110)
POTASSIUM SERPL-SCNC: 3.5 MMOL/L
RBC # BLD AUTO: 4.23 M/UL
SODIUM SERPL-SCNC: 140 MMOL/L
WBC # BLD AUTO: 4.97 K/UL

## 2017-04-22 PROCEDURE — 99232 SBSQ HOSP IP/OBS MODERATE 35: CPT | Mod: ,,, | Performed by: INTERNAL MEDICINE

## 2017-04-22 PROCEDURE — 27000221 HC OXYGEN, UP TO 24 HOURS

## 2017-04-22 PROCEDURE — 85025 COMPLETE CBC W/AUTO DIFF WBC: CPT

## 2017-04-22 PROCEDURE — 94640 AIRWAY INHALATION TREATMENT: CPT

## 2017-04-22 PROCEDURE — 80048 BASIC METABOLIC PNL TOTAL CA: CPT

## 2017-04-22 PROCEDURE — 25000003 PHARM REV CODE 250: Performed by: NURSE PRACTITIONER

## 2017-04-22 PROCEDURE — 93010 ELECTROCARDIOGRAM REPORT: CPT | Mod: ,,, | Performed by: INTERNAL MEDICINE

## 2017-04-22 PROCEDURE — 25000242 PHARM REV CODE 250 ALT 637 W/ HCPCS: Performed by: INTERNAL MEDICINE

## 2017-04-22 PROCEDURE — 94761 N-INVAS EAR/PLS OXIMETRY MLT: CPT

## 2017-04-22 PROCEDURE — 83880 ASSAY OF NATRIURETIC PEPTIDE: CPT

## 2017-04-22 PROCEDURE — 25000003 PHARM REV CODE 250: Performed by: EMERGENCY MEDICINE

## 2017-04-22 PROCEDURE — 93005 ELECTROCARDIOGRAM TRACING: CPT

## 2017-04-22 PROCEDURE — 63600175 PHARM REV CODE 636 W HCPCS: Performed by: NURSE PRACTITIONER

## 2017-04-22 PROCEDURE — 25000003 PHARM REV CODE 250: Performed by: INTERNAL MEDICINE

## 2017-04-22 PROCEDURE — 36415 COLL VENOUS BLD VENIPUNCTURE: CPT

## 2017-04-22 RX ORDER — METOLAZONE 5 MG/1
5 TABLET ORAL ONCE
Status: COMPLETED | OUTPATIENT
Start: 2017-04-22 | End: 2017-04-22

## 2017-04-22 RX ADMIN — FUROSEMIDE 40 MG: 10 INJECTION, SOLUTION INTRAMUSCULAR; INTRAVENOUS at 08:04

## 2017-04-22 RX ADMIN — ALLOPURINOL 100 MG: 100 TABLET ORAL at 08:04

## 2017-04-22 RX ADMIN — METOLAZONE 5 MG: 5 TABLET ORAL at 10:04

## 2017-04-22 RX ADMIN — ASPIRIN 81 MG: 81 TABLET, COATED ORAL at 08:04

## 2017-04-22 RX ADMIN — SODIUM CHLORIDE, PRESERVATIVE FREE 3 ML: 5 INJECTION INTRAVENOUS at 06:04

## 2017-04-22 RX ADMIN — IPRATROPIUM BROMIDE AND ALBUTEROL SULFATE 3 ML: .5; 3 SOLUTION RESPIRATORY (INHALATION) at 09:04

## 2017-04-22 RX ADMIN — DEXTROSE MONOHYDRATE 25 G: 25 INJECTION, SOLUTION INTRAVENOUS at 06:04

## 2017-04-22 RX ADMIN — CARVEDILOL 12.5 MG: 12.5 TABLET, FILM COATED ORAL at 08:04

## 2017-04-22 RX ADMIN — LISINOPRIL 5 MG: 5 TABLET ORAL at 08:04

## 2017-04-22 RX ADMIN — POTASSIUM CHLORIDE 20 MEQ: 1500 TABLET, EXTENDED RELEASE ORAL at 08:04

## 2017-04-22 RX ADMIN — Medication 24 G: at 05:04

## 2017-04-22 RX ADMIN — PANTOPRAZOLE SODIUM 40 MG: 40 TABLET, DELAYED RELEASE ORAL at 08:04

## 2017-04-22 NOTE — PLAN OF CARE
"Problem: Patient Care Overview  Goal: Plan of Care Review  Outcome: Ongoing (interventions implemented as appropriate)  POC reviewed with pt and wife at bedside - all questions answered, indicated understanding. BGs low this AM - treated effectively with IV D50 - pt states this "happens sometimes". VSS, pt AAOx3 and denies pain. Turns/repositions independently - no skin breakdown noted. 24 hour chart check completed. Michell Choudhury RN          "

## 2017-04-22 NOTE — PROGRESS NOTES
Cardiology Progress Note        SUBJECTIVE:   Patient feeling better today. Had good urine output with one dose of Metolazone on yesterday. Labs and vitals stable. Still having lower extremity edema. No chest pain or angina equivalent. BNP down to 869 from 1947 on admit    OBJECTIVE:     Vital Signs (Most Recent)  Temp: 97.6 °F (36.4 °C) (04/22/17 0853)  Pulse: (!) 51 (04/22/17 0923)  Resp: 18 (04/22/17 0923)  BP: 127/73 (04/22/17 0853)  SpO2: 98 % (04/22/17 0923)    Vital Signs Range (Last 24H):  Temp:  [97.6 °F (36.4 °C)-98.9 °F (37.2 °C)]   Pulse:  [51-81]   Resp:  [18-20]   BP: (115-129)/(61-77)   SpO2:  [94 %-100 %]     Intake/Output last 3 shifts:  I/O last 3 completed shifts:  In: 600 [P.O.:600]  Out: 1000 [Urine:1000]    Intake/Output this shift:  I/O this shift:  In: -   Out: 250 [Urine:250]    Review of patient's allergies indicates:  No Known Allergies    Current Facility-Administered Medications   Medication    acetaminophen tablet 650 mg    albuterol-ipratropium 2.5mg-0.5mg/3mL nebulizer solution 3 mL    allopurinol tablet 100 mg    aluminum-magnesium hydroxide-simethicone 200-200-20 mg/5 mL suspension 30 mL    aspirin EC tablet 81 mg    carvedilol tablet 12.5 mg    dextrose 50% injection 12.5 g    dextrose 50% injection 25 g    diphenhydrAMINE capsule 25 mg    enoxaparin injection 40 mg    furosemide injection 40 mg    glucagon (human recombinant) injection 1 mg    glucose chewable tablet 16 g    glucose chewable tablet 24 g    hydrALAZINE injection 10 mg    insulin aspart pen 0-5 Units    insulin detemir pen 40 Units    lisinopril tablet 5 mg    metOLazone tablet 5 mg    ondansetron injection 4 mg    oxycodone-acetaminophen 7.5-325 mg per tablet 1 tablet    pantoprazole EC tablet 40 mg    potassium chloride SA CR tablet 20 mEq    promethazine (PHENERGAN) 6.25 mg in dextrose 5 % 50 mL IVPB    simvastatin tablet 10 mg    sodium chloride 0.9% injection 3 mL     No current  "facility-administered medications on file prior to encounter.      Current Outpatient Prescriptions on File Prior to Encounter   Medication Sig    allopurinol (ZYLOPRIM) 100 MG tablet TAKE 1 TABLET (100 MG TOTAL) BY MOUTH ONCE DAILY.    aspirin (ECOTRIN) 81 MG EC tablet Take 81 mg by mouth once daily.    bumetanide (BUMEX) 2 MG tablet TAKE 1 TABLET (2 MG TOTAL) BY MOUTH 2 (TWO) TIMES DAILY.    carvedilol (COREG) 25 MG tablet Take 12.5 mg by mouth 2 (two) times daily with meals.    guaifenesin (MUCINEX) 600 mg 12 hr tablet Take 1,200 mg by mouth 2 (two) times daily as needed.     lisinopril (PRINIVIL,ZESTRIL) 5 MG tablet Take 1 tablet (5 mg total) by mouth once daily.    pantoprazole (PROTONIX) 40 MG tablet TAKE 1 TABLET BY MOUTH EVERY DAY FOR ACID REFLUX    potassium chloride SA (K-DUR,KLOR-CON) 10 MEQ tablet TAKE 1 TABLET BY MOUTH EVERY DAY    simvastatin (ZOCOR) 10 MG tablet TAKE 1 TABLET BY MOUTH EVERY EVENING FOR CHOLESTEROL    BD INSULIN SYRINGE ULTRA-FINE 1/2 mL 31 x 5/16" Syrg USE AS DIRECTED TO INJECT INSULIN TWO TIMES DAILY    BD INSULIN SYRINGE ULTRA-FINE 1/2 mL 31 x 5/16" Syrg USE AS DIRECTED TO INJECT INSULIN TWO TIMES DAILY    insulin glargine (LANTUS) 100 unit/mL injection 50 units bid    insulin needles, disposable, (BD ULTRA-FINE JOSLYN PEN NEEDLES) 32 x 5/32 " Ndle 1 each by Misc.(Non-Drug; Combo Route) route 4 (four) times daily.    IRON/VITAMIN B COMPLEX (GERITOL ORAL) Take by mouth.       Physical Exam:  General appearance: alert, appears stated age and cooperative  Head: Normocephalic, without obvious abnormality, atraumatic  Neck: no adenopathy, no carotid bruit, no JVD, supple  Lungs:  clear to auscultation bilaterally  Chest wall: no tenderness  Heart: regular rate and rhythm, S1, S2 normal, no murmur, click, rub or gallop  Abdomen: soft, non-tender; bowel sounds normal; no masses,  no organomegaly.  LLQ colostomy   Extremities: extremities normal, atraumatic, no cyanosis +1 " BLEedema  Pulses: 2+ and symmetric  Skin: Skin color, texture, turgor normal. No rashes or lesions  Neurologic: Grossly normal    Laboratory:  Chemistry:   Lab Results   Component Value Date     04/22/2017    K 3.5 04/22/2017     04/22/2017    CO2 29 04/22/2017    BUN 36 (H) 04/22/2017    CREATININE 1.7 (H) 04/22/2017    CALCIUM 8.8 04/22/2017     Cardiac Markers:   Lab Results   Component Value Date    TROPONINI 0.041 (H) 04/20/2017     Cardiac Markers (Last 3):   Lab Results   Component Value Date    TROPONINI 0.041 (H) 04/20/2017    TROPONINI 0.046 (H) 04/20/2017    TROPONINI 0.065 (H) 04/20/2017     CBC:   Lab Results   Component Value Date    WBC 4.97 04/22/2017    HGB 11.9 (L) 04/22/2017    HCT 36.7 (L) 04/22/2017    MCV 87 04/22/2017     (L) 04/22/2017     Lipids:   Lab Results   Component Value Date    CHOL 112 (L) 04/20/2017    TRIG 72 04/20/2017    HDL 39 (L) 04/20/2017     Coagulation:   Lab Results   Component Value Date    INR 1.1 04/19/2017    APTT 30.6 03/22/2016       Diagnostic Results:  ECG: Reviewed  X-Ray: Reviewed  Echo: Reviewed      ASSESSMENT/PLAN:     Patient Active Problem List   Diagnosis    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    Hypertension associated with diabetes    Stage 3 chronic kidney disease    ED (erectile dysfunction)    Ischemic cardiomyopathy    Pulmonary HTN    Hyperlipidemia associated with type 2 diabetes mellitus    Loss of eye - Right Eye    LBBB (left bundle branch block)    Abnormal stress test    Cysts of eyelids    CAD, multiple vessel    Primary open-angle glaucoma, moderate stage    Chronic combined systolic and diastolic heart failure    CHF NYHA class II    Biventricular ICD (implantable cardioverter-defibrillator) in place    Anemia    Colostomy in place    Type 2 diabetes mellitus with stage 3 chronic kidney disease, with long-term current use of insulin    JAHAIRA on CPAP    Gastroesophageal reflux  disease    Acute on chronic combined systolic and diastolic congestive heart failure        Plan:   Recommend repeating Metolazone dose today. Continue current dose of Lasix 40mg BID. He will need to be dc'd with this dose and needs to follow closely in clinic. Will have clinic call him to arrange follow up appt.     Chart reviewed. Patient examined by Dr. Hannon and agrees with plan that has been outlined.

## 2017-04-22 NOTE — PROGRESS NOTES
Spoke with wife, their son is in route. IV access and heart monitor was removed, no signs of distress were noted. D/c instructions were given, all questions were answered.

## 2017-04-22 NOTE — ASSESSMENT & PLAN NOTE
- Acute exacerbation due to dietary indiscretion  - Echo from 4/2016 reviewed, EF 30% with diastolic dysfunction. Repeat on 4/2017 w/o much change.  - Lasix 40 mg IV TID x4 doses  - Cardiology con board

## 2017-04-22 NOTE — PROGRESS NOTES
PT's HS CBG at 127 - 1 packet of jose crackers with peanut butter given and eaten for bedtime snack. No sliding scale insulin given, Levemir only per MAR. At 0518 pt's CBG at <35, and rechecked at 0519 also at <35 - pt AAOx3, sitting up in bed, appears asymptomatic, able to take p.o. 24g Glucose tablets given. Rechecked CBG at 0557 with result of <29. PT now becoming sweaty and very slightly confused - 25g D50 given per IV with immediate improvement in arousal noted. Recheck at 0632 with  - pt fully AAOx3, no longer sweating. Michell Choudhury RN

## 2017-04-22 NOTE — DISCHARGE SUMMARY
"[facility] Hospital Medicine  Discharge Summary      Patient Name: Yan Choudhury  MRN: [id number]  Admission Date: 4/19/2017  Hospital Length of Stay: 3 days  Discharge Date and Time:  04/22/2017   Attending Physician: No att. providers found   Discharging Provider: Federico Restrepo MD  Primary Care Provider: Sandra Estevez MD      HPI:   Mr. Choudhury is a 61 year old AA male with h/o CHF, CAD/ICM (s/p [facility] 2009, diffuse CAD), DCM, LBBB, HTN, CKD3, T2DM, dyslipidemia, and obesity was sent from cardiology clinic for admission acute CHF exacerbation. Patient admits to dietary indiscretion over the Easter weekend.  He traveled out of town for the Easter holiday and indulged in crawfish, ochoa, and fried fish. Takes Bumex 2 mg po BID with no significant relief. He states he feels he is SOB even when speaking. His weight has also increased by 16 pounds. Patient denies any PND or orthopnea but wife reports he has nocturnal "gagging" and "coughing" while he is lying flat. Lower extremity edema is also increased. Patient reports compliance with his medications.    * No surgery found *      Indwelling Lines/Drains at time of discharge:   Lines/Drains/Airways     Drain                 Colostomy 04/22/16 1710 Descending/sigmoid  days              Hospital Course:   Exacerbation likely from medication/diet non adherence. Troponin elevation with peak of 0.065 noted along with subsequent down trend; likely from demand ischemia/CHF. Started on diuresis. Cardiology consulted and agreed with plan. Trial of metolazone given. Negative fluid balance achieved and weight loss of 5 pounds noted.  Improved clinically and denying any complaints. Deemed stable for discharge and follow up as outpatient.           Consults:   Consults         Status Ordering Provider     Inpatient consult to Cardiology  Once     Provider:  Luther Calvillo MD    Completed KATE MONTILLA          Significant Diagnostic Studies: Labs: All labs within " the past 24 hours have been reviewed    Pending Diagnostic Studies:     None        Final Active Diagnoses:    Diagnosis Date Noted POA    PRINCIPAL PROBLEM:  Acute on chronic combined systolic and diastolic congestive heart failure [I50.43] 04/19/2017 Yes    Stage 3 chronic kidney disease [N18.3]  Yes    Type 2 diabetes mellitus with stage 3 chronic kidney disease, with long-term current use of insulin [E11.22, N18.3, Z79.4] 11/17/2016 Not Applicable    CAD, multiple vessel [I25.10] 07/13/2015 Yes     Chronic    Biventricular ICD (implantable cardioverter-defibrillator) in place [Z95.810] 04/19/2016 Yes     Chronic      Problems Resolved During this Admission:    Diagnosis Date Noted Date Resolved POA      * Acute on chronic combined systolic and diastolic congestive heart failure  - Acute exacerbation due to dietary indiscretion  - Echo from 4/2016 reviewed, EF 30% with diastolic dysfunction. Repeat on 4/2017 w/o much change.  - Lasix 40 mg IV TID x4 doses  - Cardiology con board    Stage 3 chronic kidney disease  - Stable, at baseline      Type 2 diabetes mellitus with stage 3 chronic kidney disease, with long-term current use of insulin  - Resume home insulin regimen, at lower dose  - ISS      Biventricular ICD (implantable cardioverter-defibrillator) in place  stable        Discharged Condition: stable    Disposition: Home or Self Care    Follow Up:  Follow-up Information     Follow up with Sandra Estevez MD In 1 week.    Specialty:  Family Medicine    Contact information:    [address]  [place] [address] [phone]          Patient Instructions:     Diet general     Activity as tolerated     Call MD for:  temperature >100.4     Call MD for:  persistent nausea and vomiting or diarrhea     Call MD for:  severe uncontrolled pain     Call MD for:  redness, tenderness, or signs of infection (pain, swelling, redness, odor or green/yellow discharge around incision site)     Call MD for:  difficulty  "breathing or increased cough     Call MD for:  severe persistent headache     Call MD for:  worsening rash     Call MD for:  persistent dizziness, light-headedness, or visual disturbances     Call MD for:  increased confusion or weakness       Medications:  Reconciled Home Medications:   Discharge Medication List as of 4/22/2017  3:41 PM      CONTINUE these medications which have NOT CHANGED    Details   allopurinol (ZYLOPRIM) 100 MG tablet TAKE 1 TABLET (100 MG TOTAL) BY MOUTH ONCE DAILY., Normal      aspirin (ECOTRIN) 81 MG EC tablet Take 81 mg by mouth once daily., Until Discontinued, Historical Med      !! BD INSULIN SYRINGE ULTRA-FINE 1/2 mL 31 x 5/16" Syrg USE AS DIRECTED TO INJECT INSULIN TWO TIMES DAILY, Normal      !! BD INSULIN SYRINGE ULTRA-FINE 1/2 mL 31 x 5/16" Syrg USE AS DIRECTED TO INJECT INSULIN TWO TIMES DAILY, Normal      bumetanide (BUMEX) 2 MG tablet TAKE 1 TABLET (2 MG TOTAL) BY MOUTH 2 (TWO) TIMES DAILY., Normal      carvedilol (COREG) 25 MG tablet Take 12.5 mg by mouth 2 (two) times daily with meals., Until Discontinued, Historical Med      guaifenesin (MUCINEX) 600 mg 12 hr tablet Take 1,200 mg by mouth 2 (two) times daily as needed. , Until Discontinued, Historical Med      insulin glargine (LANTUS) 100 unit/mL injection 50 units bid, Normal      insulin needles, disposable, (BD ULTRA-FINE JOSLYN PEN NEEDLES) 32 x 5/32 " Ndle 1 each by Misc.(Non-Drug; Combo Route) route 4 (four) times daily., Starting 11/10/2015, Until Discontinued, Normal      IRON/VITAMIN B COMPLEX (GERITOL ORAL) Take by mouth., Until Discontinued, Historical Med      lisinopril (PRINIVIL,ZESTRIL) 5 MG tablet Take 1 tablet (5 mg total) by mouth once daily., Starting 2/21/2017, Until Discontinued, Normal      pantoprazole (PROTONIX) 40 MG tablet TAKE 1 TABLET BY MOUTH EVERY DAY FOR ACID REFLUX, Normal      potassium chloride SA (K-DUR,KLOR-CON) 10 MEQ tablet TAKE 1 TABLET BY MOUTH EVERY DAY, Normal      simvastatin (ZOCOR) " 10 MG tablet TAKE 1 TABLET BY MOUTH EVERY EVENING FOR CHOLESTEROL, Normal       !! - Potential duplicate medications found. Please discuss with provider.        Time spent on the discharge of patient: >30 minutes    Federico Restrepo MD  Department of Hospital Medicine  [facility]

## 2017-04-25 ENCOUNTER — TELEPHONE (OUTPATIENT)
Dept: CARDIOLOGY | Facility: CLINIC | Age: 62
End: 2017-04-25

## 2017-04-25 ENCOUNTER — PATIENT OUTREACH (OUTPATIENT)
Dept: ADMINISTRATIVE | Facility: CLINIC | Age: 62
End: 2017-04-25
Payer: COMMERCIAL

## 2017-04-25 NOTE — TELEPHONE ENCOUNTER
"----- Message from Brenda Youngblood RN sent at 4/25/2017 12:49 PM CDT -----  Hello,  I just completed a "transition of care" call with Yan Choudhury.  He is a recent discharge from the hospital with CHF and needs a follow up clinic appointment scheduled.  Please call the patient's cell phone @ [phone] to set up this appointment.  Thanks so much.  Brenda Youngblood RN  Care Coordination Call Center  [facility]    "

## 2017-04-25 NOTE — PATIENT INSTRUCTIONS

## 2017-04-26 NOTE — TELEPHONE ENCOUNTER
----- Message from Sherry Norwood sent at 4/26/2017 10:01 AM CDT -----  Contact: Elsie/spouse  Elsie is returning the nurse call regarding pt. Pls call Elsie back at [phone].

## 2017-05-02 ENCOUNTER — HOSPITAL ENCOUNTER (OUTPATIENT)
Dept: RADIOLOGY | Facility: HOSPITAL | Age: 62
Discharge: HOME | End: 2017-05-02
Attending: FAMILY MEDICINE
Payer: COMMERCIAL

## 2017-05-02 ENCOUNTER — OFFICE VISIT (OUTPATIENT)
Dept: CARDIOLOGY | Facility: CLINIC | Age: 62
End: 2017-05-02
Payer: COMMERCIAL

## 2017-05-02 ENCOUNTER — CLINICAL SUPPORT (OUTPATIENT)
Dept: CARDIOLOGY | Facility: CLINIC | Age: 62
End: 2017-05-02
Payer: COMMERCIAL

## 2017-05-02 ENCOUNTER — OFFICE VISIT (OUTPATIENT)
Dept: INTERNAL MEDICINE | Facility: CLINIC | Age: 62
End: 2017-05-02
Payer: COMMERCIAL

## 2017-05-02 VITALS
BODY MASS INDEX: 36.25 KG/M2 | HEIGHT: 63 IN | WEIGHT: 204.56 LBS | HEART RATE: 84 BPM | DIASTOLIC BLOOD PRESSURE: 64 MMHG | SYSTOLIC BLOOD PRESSURE: 104 MMHG

## 2017-05-02 VITALS
DIASTOLIC BLOOD PRESSURE: 60 MMHG | HEIGHT: 63 IN | TEMPERATURE: 97 F | WEIGHT: 204.38 LBS | SYSTOLIC BLOOD PRESSURE: 102 MMHG | OXYGEN SATURATION: 98 % | BODY MASS INDEX: 36.21 KG/M2 | HEART RATE: 84 BPM

## 2017-05-02 DIAGNOSIS — I50.40: ICD-10-CM

## 2017-05-02 DIAGNOSIS — N18.30 STAGE 3 CHRONIC KIDNEY DISEASE: ICD-10-CM

## 2017-05-02 DIAGNOSIS — M1A.9XX0 CHRONIC GOUT WITHOUT TOPHUS, UNSPECIFIED CAUSE, UNSPECIFIED SITE: ICD-10-CM

## 2017-05-02 DIAGNOSIS — I50.42 CHRONIC COMBINED SYSTOLIC AND DIASTOLIC HEART FAILURE: Primary | Chronic | ICD-10-CM

## 2017-05-02 DIAGNOSIS — R06.02 SHORTNESS OF BREATH: ICD-10-CM

## 2017-05-02 DIAGNOSIS — Z95.810 BIVENTRICULAR ICD (IMPLANTABLE CARDIOVERTER-DEFIBRILLATOR) IN PLACE: Chronic | ICD-10-CM

## 2017-05-02 DIAGNOSIS — E11.69 HYPERLIPIDEMIA ASSOCIATED WITH TYPE 2 DIABETES MELLITUS: ICD-10-CM

## 2017-05-02 DIAGNOSIS — N18.30 TYPE 2 DIABETES MELLITUS WITH STAGE 3 CHRONIC KIDNEY DISEASE, WITH LONG-TERM CURRENT USE OF INSULIN: ICD-10-CM

## 2017-05-02 DIAGNOSIS — M79.89 LEFT LEG SWELLING: ICD-10-CM

## 2017-05-02 DIAGNOSIS — E78.5 HYPERLIPIDEMIA ASSOCIATED WITH TYPE 2 DIABETES MELLITUS: ICD-10-CM

## 2017-05-02 DIAGNOSIS — I25.10 CAD, MULTIPLE VESSEL: Chronic | ICD-10-CM

## 2017-05-02 DIAGNOSIS — G47.33 OSA (OBSTRUCTIVE SLEEP APNEA): ICD-10-CM

## 2017-05-02 DIAGNOSIS — I44.7 LBBB (LEFT BUNDLE BRANCH BLOCK): ICD-10-CM

## 2017-05-02 DIAGNOSIS — Z79.4 TYPE 2 DIABETES MELLITUS WITH STAGE 3 CHRONIC KIDNEY DISEASE, WITH LONG-TERM CURRENT USE OF INSULIN: ICD-10-CM

## 2017-05-02 DIAGNOSIS — I50.43 ACUTE ON CHRONIC COMBINED SYSTOLIC AND DIASTOLIC CONGESTIVE HEART FAILURE: ICD-10-CM

## 2017-05-02 DIAGNOSIS — I27.20 PULMONARY HTN: ICD-10-CM

## 2017-05-02 DIAGNOSIS — I50.43 ACUTE ON CHRONIC COMBINED SYSTOLIC AND DIASTOLIC CONGESTIVE HEART FAILURE: Primary | ICD-10-CM

## 2017-05-02 DIAGNOSIS — E11.22 TYPE 2 DIABETES MELLITUS WITH STAGE 3 CHRONIC KIDNEY DISEASE, WITH LONG-TERM CURRENT USE OF INSULIN: ICD-10-CM

## 2017-05-02 DIAGNOSIS — E66.01 SEVERE OBESITY (BMI 35.0-39.9) WITH COMORBIDITY: ICD-10-CM

## 2017-05-02 DIAGNOSIS — I15.2 HYPERTENSION ASSOCIATED WITH DIABETES: ICD-10-CM

## 2017-05-02 DIAGNOSIS — Z93.3 COLOSTOMY IN PLACE: ICD-10-CM

## 2017-05-02 DIAGNOSIS — I25.5 ISCHEMIC CARDIOMYOPATHY: ICD-10-CM

## 2017-05-02 DIAGNOSIS — Z09 HOSPITAL DISCHARGE FOLLOW-UP: ICD-10-CM

## 2017-05-02 DIAGNOSIS — E11.59 HYPERTENSION ASSOCIATED WITH DIABETES: ICD-10-CM

## 2017-05-02 DIAGNOSIS — K21.9 GASTROESOPHAGEAL REFLUX DISEASE, ESOPHAGITIS PRESENCE NOT SPECIFIED: ICD-10-CM

## 2017-05-02 PROCEDURE — 3074F SYST BP LT 130 MM HG: CPT | Mod: S$GLB,,, | Performed by: PHYSICIAN ASSISTANT

## 2017-05-02 PROCEDURE — 71020 XR CHEST PA AND LATERAL: CPT | Mod: 26,,, | Performed by: RADIOLOGY

## 2017-05-02 PROCEDURE — 99999 PR PBB SHADOW E&M-EST. PATIENT-LVL III: CPT | Mod: 25,PBBFAC,, | Performed by: FAMILY MEDICINE

## 2017-05-02 PROCEDURE — 99999 PR PBB SHADOW E&M-EST. PATIENT-LVL III: CPT | Mod: PBBFAC,,, | Performed by: PHYSICIAN ASSISTANT

## 2017-05-02 PROCEDURE — 99214 OFFICE O/P EST MOD 30 MIN: CPT | Mod: S$GLB,,, | Performed by: PHYSICIAN ASSISTANT

## 2017-05-02 PROCEDURE — 93971 EXTREMITY STUDY: CPT | Mod: S$GLB,,, | Performed by: INTERNAL MEDICINE

## 2017-05-02 PROCEDURE — 1160F RVW MEDS BY RX/DR IN RCRD: CPT | Mod: S$GLB,,, | Performed by: PHYSICIAN ASSISTANT

## 2017-05-02 PROCEDURE — 3078F DIAST BP <80 MM HG: CPT | Mod: S$GLB,,, | Performed by: PHYSICIAN ASSISTANT

## 2017-05-02 PROCEDURE — 71020 XR CHEST PA AND LATERAL: CPT | Mod: TC,PO
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
## 2017-05-02 NOTE — PROGRESS NOTES
Subjective:    Patient ID:  Yan Choudhury is a 61 y.o. male who presents for follow-up of Hospital Follow Up      HPI   Mr. Choudhury is a 61 year old male patient with a PMHx of CAD/ICM (s/p [facility] 2009, diffuse CAD), DCM, LBBB, HTN, CRI, DM, dyslipidemia, and obesity who presents today for follow-up. Patient recently hospitalized at [facility] with decompensated CHF. His medications were optimized and he was diuresed and subsequently discharged. He returns today and states he is doing ok. SOB improved, still dyspneic when speaking although he states this is his norm. He also reports improvement in lower extremity edema and abdominal bloating. No longer gagging at night. Denies any chest pain, lightheadedness, dizziness, palpitations, near syncope, or syncope. No AICD shocks. Seems to be tolerating meds. Weight down 16 lbs from my last visit. PCP ordered labs and CXR today. His wife is trying to make sure he is more compliant with salt. d fried fish.     Review of Systems   Constitution: Positive for weight loss. Negative for chills, decreased appetite, fever, weakness and malaise/fatigue.   HENT: Negative for congestion, headaches, hoarse voice and sore throat.    Eyes: Negative for blurred vision and discharge.   Cardiovascular: Positive for dyspnea on exertion and leg swelling (minimal, improved). Negative for chest pain, claudication, cyanosis, irregular heartbeat, near-syncope, orthopnea, palpitations and paroxysmal nocturnal dyspnea.   Respiratory: Positive for shortness of breath (improved). Negative for cough, hemoptysis, snoring, sputum production and wheezing.    Endocrine: Negative for cold intolerance and heat intolerance.   Hematologic/Lymphatic: Negative for bleeding problem. Does not bruise/bleed easily.   Skin: Negative for rash.   Musculoskeletal: Negative for arthritis, back pain, joint pain, joint swelling, muscle cramps, muscle weakness and myalgias.   Gastrointestinal: Negative for abdominal pain,  "constipation, diarrhea, heartburn, melena and nausea.   Genitourinary: Negative for hematuria.   Neurological: Negative for dizziness, focal weakness, light-headedness, loss of balance, numbness, paresthesias and seizures.   Psychiatric/Behavioral: Negative for memory loss. The patient does not have insomnia.    Allergic/Immunologic: Negative for hives.       /64  Pulse 84  Ht 5' 3" (1.6 m)  Wt 92.8 kg (204 lb 9.4 oz)  BMI 36.24 kg/m2    Objective:    Physical Exam   Constitutional: He is oriented to person, place, and time. He appears well-developed and well-nourished. No distress.   HENT:   Head: Normocephalic and atraumatic.   Eyes: Pupils are equal, round, and reactive to light. Right eye exhibits no discharge. Left eye exhibits no discharge.   Neck: Neck supple. No JVD present. No tracheal deviation present. No thyromegaly present.   Cardiovascular: Normal rate, regular rhythm, S1 normal, S2 normal and normal heart sounds.  PMI is not displaced.  Exam reveals no gallop, no S3, no S4 and no friction rub.    No murmur heard.  Pulses:       Radial pulses are 2+ on the right side, and 2+ on the left side.   Pulmonary/Chest: Effort normal. No respiratory distress. He has no wheezes. He has rales (faint at bases).   Abdominal: Soft. He exhibits no distension. There is no tenderness. There is no rebound.   Colostomy   Musculoskeletal: He exhibits edema (1+ L >R).   Neurological: He is alert and oriented to person, place, and time.   Skin: Skin is warm and dry. He is not diaphoretic. No erythema.   Psychiatric: He has a normal mood and affect. His behavior is normal. Thought content normal.   Nursing note and vitals reviewed.      2D Echo CONCLUSIONS     1 - Mild left atrial enlargement.     2 - Mild left ventricular enlargement.     3 - Eccentric hypertrophy.     4 - Moderately depressed left ventricular systolic function (EF 30-35%).     5 - Left ventricular diastolic dysfunction.     6 - Low normal right " ventricular systolic function .     7 - Moderate aortic regurgitation.     8 - Moderate tricuspid regurgitation.     9 - Pulmonary hypertension. The estimated PA systolic pressure is 59 mmHg.          Chemistry        Component Value Date/Time     04/22/2017 0700    K 3.5 04/22/2017 0700     04/22/2017 0700    CO2 29 04/22/2017 0700    BUN 36 (H) 04/22/2017 0700    CREATININE 1.7 (H) 04/22/2017 0700     (H) 04/22/2017 0700        Component Value Date/Time    CALCIUM 8.8 04/22/2017 0700    ALKPHOS 108 04/19/2017 2202    AST 33 04/19/2017 2202    ALT 14 04/19/2017 2202    BILITOT 1.3 (H) 04/19/2017 2202          Assessment:       1. Chronic combined systolic and diastolic heart failure    2. Left leg swelling    3. Pulmonary HTN    4. Acute on chronic combined systolic and diastolic congestive heart failure    5. CAD, multiple vessel    6. CHF NYHA class II, unspecified failure chronicity, combined    7. LBBB (left bundle branch block)    8. Stage 3 chronic kidney disease    9. Severe obesity (BMI 35.0-39.9) with comorbidity    10. Biventricular ICD (implantable cardioverter-defibrillator) in place       Patient s/p hospital stay for decompensated CHF, clinically improved although likely still has some volume overload. Labs and CXR pending, further rec's to follow. Continue current meds for now. LLE also with greater swelling, will check venous U/S to rule out DVT. Patient counseled on maintaining low salt diet.   Plan:   -Continue current medical management and risk factor modification  -Cardiac, low salt diet, < 2 gram Na/day; 1.5 L fluid restriction  -LLE venous U/S today with phone review  -May adjust dose of diuretics pending labs and CXR  -Follow-up TBA    Chart reviewed. Dr. Mercer examined patient and formulated plan as outlined above.

## 2017-05-02 NOTE — MR AVS SNAPSHOT
[facility] - Internal Medicine  [address] [place] [address]  Phone: [phone]  Fax: [phone]                  Yan Choudhury   2017 1:20 PM   Office Visit    Description:  Male : 1955   Provider:  Sandra Estevez MD   Department:  [facility] - Internal Medicine           Reason for Visit     Hospital Follow Up           Diagnoses this Visit        Comments    Acute on chronic combined systolic and diastolic congestive heart failure    -  Primary     Hospital discharge follow-up         Hypertension associated with diabetes         Hyperlipidemia associated with type 2 diabetes mellitus         Type 2 diabetes mellitus with stage 3 chronic kidney disease, with long-term current use of insulin         Biventricular ICD (implantable cardioverter-defibrillator) in place         CAD, multiple vessel         Colostomy in place         Gastroesophageal reflux disease, esophagitis presence not specified         JAHAIRA (obstructive sleep apnea)         Shortness of breath         Ischemic cardiomyopathy         Pulmonary HTN         Severe obesity (BMI 35.0-39.9) with comorbidity                To Do List           Future Appointments        Provider Department Dept Phone    2017 2:45 PM [facility] XR2 [facility] [phone]    5/10/2017 8:00 AM Sybil Morin NP [facility] - Pulmonary Services [phone]    5/15/2017 8:00 AM HOME MONITOR DEVICE CHECK [facility] - Arrhythmia Procedures [phone]    2017 3:00 PM Otilia Prado RD, CDE [facility] - Diabetes Management [phone]    2017 8:00 AM Sandra Estevez MD [facility] Internal Medicine [phone]      Goals (5 Years of Data)     None      Follow-Up and Disposition     Return in about 6 months (around 2017), or if symptoms worsen or fail to improve.      [facility]     [facility] Nurse Care Line -  Assistance  Unless otherwise directed by your provider, please contact [facility], our nurse care line  "that is available for 24/7 assistance.     Registered nurses in the [facility] provide: appointment scheduling, clinical advisement, health education, and other advisory services.  Call: [phone] (toll free)               Medications           Message regarding Medications     Verify the changes and/or additions to your medication regime listed below are the same as discussed with your clinician today.  If any of these changes or additions are incorrect, please notify your healthcare provider.             Verify that the below list of medications is an accurate representation of the medications you are currently taking.  If none reported, the list may be blank. If incorrect, please contact your healthcare provider. Carry this list with you in case of emergency.           Current Medications     allopurinol (ZYLOPRIM) 100 MG tablet TAKE 1 TABLET (100 MG TOTAL) BY MOUTH ONCE DAILY.    aspirin (ECOTRIN) 81 MG EC tablet Take 81 mg by mouth once daily.    BD INSULIN SYRINGE ULTRA-FINE 1/2 mL 31 x 5/16" Syrg USE AS DIRECTED TO INJECT INSULIN TWO TIMES DAILY    bumetanide (BUMEX) 2 MG tablet TAKE 1 TABLET (2 MG TOTAL) BY MOUTH 2 (TWO) TIMES DAILY.    carvedilol (COREG) 25 MG tablet Take 12.5 mg by mouth 2 (two) times daily with meals.    insulin glargine (LANTUS) 100 unit/mL injection 50 units bid    insulin needles, disposable, (BD ULTRA-FINE JOSLYN PEN NEEDLES) 32 x 5/32 " Ndle 1 each by Misc.(Non-Drug; Combo Route) route 4 (four) times daily.    IRON/VITAMIN B COMPLEX (GERITOL ORAL) Take by mouth.    lisinopril (PRINIVIL,ZESTRIL) 5 MG tablet Take 1 tablet (5 mg total) by mouth once daily.    pantoprazole (PROTONIX) 40 MG tablet TAKE 1 TABLET BY MOUTH EVERY DAY FOR ACID REFLUX    potassium chloride SA (K-DUR,KLOR-CON) 10 MEQ tablet TAKE 1 TABLET BY MOUTH EVERY DAY    simvastatin (ZOCOR) 10 MG tablet TAKE 1 TABLET BY MOUTH EVERY EVENING FOR CHOLESTEROL    guaifenesin (MUCINEX) 600 mg 12 hr tablet Take " "1,200 mg by mouth 2 (two) times daily as needed.            Clinical Reference Information           Your Vitals Were     BP Pulse Temp Height Weight SpO2    102/60 84 96.8 °F (36 °C) (Tympanic) 5' 3" (1.6 m) 92.7 kg (204 lb 5.9 oz) 98%    BMI                36.2 kg/m2          Blood Pressure          Most Recent Value    BP  102/60      Allergies as of 5/2/2017     No Known Allergies      Immunizations Administered on Date of Encounter - 5/2/2017     None      Orders Placed During Today's Visit      Normal Orders This Visit    Ambulatory referral to Pulmonology     Future Labs/Procedures Expected by Expires    Brain natriuretic peptide  5/2/2017 7/1/2018    CBC auto differential  5/2/2017 7/1/2018    Comprehensive metabolic panel  5/2/2017 7/1/2018    X-Ray Chest PA And Lateral  5/2/2017 5/2/2018      Language Assistance Services     ATTENTION: Language assistance services are available, free of charge. Please call [phone].      ATENCIÓN: Si habla español, tiene a roman disposición servicios gratuitos de asistencia lingüística. Llame al [phone].     CHÚ Ý: N?u b?n nói Ti?ng Vi?t, có các d?ch v? h? tr? ngôn ng? mi?n phí dành cho b?n. G?i s? [phone].         [facility] - Internal Medicine complies with applicable Federal civil rights laws and does not discriminate on the basis of race, color, national origin, age, disability, or sex.        "

## 2017-05-03 ENCOUNTER — TELEPHONE (OUTPATIENT)
Dept: CARDIOLOGY | Facility: CLINIC | Age: 62
End: 2017-05-03

## 2017-05-03 NOTE — TELEPHONE ENCOUNTER
----- Message from JORGE Dhillon sent at 5/3/2017  2:41 PM CDT -----  Please phone patient. LLE U/S negative for DVT    Thanks

## 2017-05-03 NOTE — TELEPHONE ENCOUNTER
----- Message from JORGE Dhillon sent at 5/3/2017  2:39 PM CDT -----  Please phone patient. I reviewed CXR, he still has some vascular congestion. Still awaiting labs. Continue current dose of diuretic for now. He needs to watch his salt    Thanks

## 2017-05-04 ENCOUNTER — TELEPHONE (OUTPATIENT)
Dept: INTERNAL MEDICINE | Facility: CLINIC | Age: 62
End: 2017-05-04

## 2017-05-04 NOTE — TELEPHONE ENCOUNTER
----- Message from Don Naqvi sent at 5/4/2017  9:21 AM CDT -----  Contact: Elsie, pt's spouse  She's calling in regards to a missed call, please advise, [phone] (home)

## 2017-05-05 ENCOUNTER — TELEPHONE (OUTPATIENT)
Dept: CARDIOLOGY | Facility: CLINIC | Age: 62
End: 2017-05-05

## 2017-05-05 NOTE — TELEPHONE ENCOUNTER
----- Message from JORGE Dhillon sent at 5/5/2017  4:47 PM CDT -----  CHF patient, discussing adding Zaroxlyn with Dr. Calvillo, if agreeable will send in script and call tmw AM with instructions    Please make patient aware    Thanks

## 2017-05-06 ENCOUNTER — TELEPHONE (OUTPATIENT)
Dept: CARDIOLOGY | Facility: HOSPITAL | Age: 62
End: 2017-05-06

## 2017-05-06 DIAGNOSIS — I50.22 CHRONIC SYSTOLIC CONGESTIVE HEART FAILURE: Primary | ICD-10-CM

## 2017-05-06 RX ORDER — METOLAZONE 2.5 MG/1
2.5 TABLET ORAL DAILY
Qty: 3 TABLET | Refills: 0 | Status: SHIPPED | OUTPATIENT
Start: 2017-05-06 | End: 2017-05-15 | Stop reason: SDUPTHER

## 2017-05-06 NOTE — TELEPHONE ENCOUNTER
Spoke with patient's wife, Elsie. Notified her of rec's to take 2.5 mg of metolazone x 3 days due to lab and CXR findings, this was discussed with Dr. Calvillo. Encouraged patient to eat K rich food while on extra booster pill. Will f/u next week. Patient's wife verbalized understanding and repeated all instructions back to me correctly.

## 2017-05-08 ENCOUNTER — TELEPHONE (OUTPATIENT)
Dept: CARDIOLOGY | Facility: CLINIC | Age: 62
End: 2017-05-08

## 2017-05-08 NOTE — TELEPHONE ENCOUNTER
----- Message from JORGE Dhillon sent at 5/8/2017  8:47 AM CDT -----  CHF patient, can you see how he responded to dose of metolazone over weekend?    THanks

## 2017-05-08 NOTE — TELEPHONE ENCOUNTER
I have attempted without success to contact this patient by phone to I left a message on answering machine for pt to call back.

## 2017-05-10 NOTE — TELEPHONE ENCOUNTER
i spoke with pt.   His wt is down from 204 [id number]. He did have increase in UOP with metolazone.   Swelling in LE is slightly better, she says it isnt any worse.   No increase in sob.   I let her know I would notify the provider and call back if any further orders.

## 2017-05-12 NOTE — TELEPHONE ENCOUNTER
He can take another dose of metolazone today. Continue current dose of furosemide. Eat a banana today or other K rich food since booster pill was added today. Will need repeat BMP next week at his convenience with a nurse visit for symptom check. Please arrange.

## 2017-05-12 NOTE — TELEPHONE ENCOUNTER
----- Message from JORGE Dhillon sent at 5/10/2017  3:12 PM CDT -----  Can we try to reach patient again please?    Thanks

## 2017-05-15 ENCOUNTER — TELEPHONE (OUTPATIENT)
Dept: PULMONOLOGY | Facility: CLINIC | Age: 62
End: 2017-05-15

## 2017-05-15 ENCOUNTER — CLINICAL SUPPORT (OUTPATIENT)
Dept: CARDIOLOGY | Facility: CLINIC | Age: 62
End: 2017-05-15
Payer: COMMERCIAL

## 2017-05-15 ENCOUNTER — OFFICE VISIT (OUTPATIENT)
Dept: OPHTHALMOLOGY | Facility: CLINIC | Age: 62
End: 2017-05-15
Payer: COMMERCIAL

## 2017-05-15 DIAGNOSIS — I25.5 ISCHEMIC CARDIOMYOPATHY: ICD-10-CM

## 2017-05-15 DIAGNOSIS — I50.22 CHRONIC SYSTOLIC CONGESTIVE HEART FAILURE: Primary | ICD-10-CM

## 2017-05-15 DIAGNOSIS — I50.22 CHRONIC SYSTOLIC CONGESTIVE HEART FAILURE: ICD-10-CM

## 2017-05-15 DIAGNOSIS — H40.1122 PRIMARY OPEN ANGLE GLAUCOMA OF LEFT EYE, MODERATE STAGE: Primary | ICD-10-CM

## 2017-05-15 DIAGNOSIS — I50.42 CHRONIC COMBINED SYSTOLIC AND DIASTOLIC HEART FAILURE: ICD-10-CM

## 2017-05-15 PROCEDURE — 93295 DEV INTERROG REMOTE 1/2/MLT: CPT | Mod: S$GLB,,, | Performed by: INTERNAL MEDICINE

## 2017-05-15 PROCEDURE — 92012 INTRM OPH EXAM EST PATIENT: CPT | Mod: S$GLB,,, | Performed by: OPHTHALMOLOGY

## 2017-05-15 PROCEDURE — 93296 REM INTERROG EVL PM/IDS: CPT | Mod: S$GLB,,, | Performed by: INTERNAL MEDICINE

## 2017-05-15 PROCEDURE — 93297 REM INTERROG DEV EVAL ICPMS: CPT | Mod: S$GLB,,, | Performed by: INTERNAL MEDICINE

## 2017-05-15 PROCEDURE — 99999 PR PBB SHADOW E&M-EST. PATIENT-LVL II: CPT | Mod: PBBFAC,,, | Performed by: OPHTHALMOLOGY

## 2017-05-15 RX ORDER — METOLAZONE 2.5 MG/1
TABLET ORAL
Qty: 30 TABLET | Refills: 12 | Status: SHIPPED | OUTPATIENT
Start: 2017-05-15 | End: 2017-05-18 | Stop reason: SINTOL

## 2017-05-15 NOTE — TELEPHONE ENCOUNTER
Spoke with wife regarding appt. Unable to see pt at later time today since mikael is booked up. Wife understands and asked to reschedule to Wednesday. Confirmed appt with wife

## 2017-05-15 NOTE — TELEPHONE ENCOUNTER
----- Message from Dread Peña sent at 5/15/2017 10:26 AM CDT -----  Contact: Wife/Elsie Zimmerman called in regarding the attached patient (-Yan).  Patient had a 10am appt today 5/15/17 but [facility].  Elsie stated patient is with doctor now and wanted to see if he can still be seen as it will be another 15-20 minutes.      Elsie's call back number is [phone]

## 2017-05-15 NOTE — MR AVS SNAPSHOT
[facility]  [address] [place] [address]  Phone: [phone]  Fax: [phone]                  Yan Choudhury   5/15/2017 9:00 AM   Office Visit    Description:  Male : 1955   Provider:  LOKI Araiza MD   Department:  [facility] - Ophthalmology           Reason for Visit     Glaucoma           Diagnoses this Visit        Comments    Primary open angle glaucoma of left eye, moderate stage    -  Primary            To Do List           Future Appointments        Provider Department Dept Phone    2017 3:00 PM Otilia Prado RD, CDE [facility] - Diabetes Management [phone]    2017 8:00 AM Sandra Estevez MD [facility] - Internal Medicine [phone]      Goals (5 Years of Data)     None      Follow-Up and Disposition     Return in about 4 months (around 9/15/2017).      [facility]     [facility] Nurse Care [facility] -  Assistance  Unless otherwise directed by your provider, please contact [facility], our nurse care line that is available for  assistance.     Registered nurses in the [facility] provide: appointment scheduling, clinical advisement, health education, and other advisory services.  Call: [phone] (toll free)               Medications           Message regarding Medications     Verify the changes and/or additions to your medication regime listed below are the same as discussed with your clinician today.  If any of these changes or additions are incorrect, please notify your healthcare provider.             Verify that the below list of medications is an accurate representation of the medications you are currently taking.  If none reported, the list may be blank. If incorrect, please contact your healthcare provider. Carry this list with you in case of emergency.           Current Medications     allopurinol (ZYLOPRIM) 100 MG tablet TAKE 1 TABLET (100 MG TOTAL) BY MOUTH ONCE DAILY.    aspirin (ECOTRIN) 81 MG EC tablet Take 81 mg  "by mouth once daily.    BD INSULIN SYRINGE ULTRA-FINE 1/2 mL 31 x 5/16" Syrg USE AS DIRECTED TO INJECT INSULIN TWO TIMES DAILY    bumetanide (BUMEX) 2 MG tablet TAKE 1 TABLET (2 MG TOTAL) BY MOUTH 2 (TWO) TIMES DAILY.    carvedilol (COREG) 25 MG tablet Take 12.5 mg by mouth 2 (two) times daily with meals.    guaifenesin (MUCINEX) 600 mg 12 hr tablet Take 1,200 mg by mouth 2 (two) times daily as needed.     insulin glargine (LANTUS) 100 unit/mL injection 50 units bid    insulin needles, disposable, (BD ULTRA-FINE JOSLYN PEN NEEDLES) 32 x 5/32 " Ndle 1 each by Misc.(Non-Drug; Combo Route) route 4 (four) times daily.    IRON/VITAMIN B COMPLEX (GERITOL ORAL) Take by mouth.    lisinopril (PRINIVIL,ZESTRIL) 5 MG tablet Take 1 tablet (5 mg total) by mouth once daily.    metOLazone (ZAROXOLYN) 2.5 MG tablet Take 1 tablet (2.5 mg total) by mouth once daily. Take 30 minutes before AM dose of Bumex    pantoprazole (PROTONIX) 40 MG tablet TAKE 1 TABLET BY MOUTH EVERY DAY FOR ACID REFLUX    potassium chloride SA (K-DUR,KLOR-CON) 10 MEQ tablet TAKE 1 TABLET BY MOUTH EVERY DAY    simvastatin (ZOCOR) 10 MG tablet TAKE 1 TABLET BY MOUTH EVERY EVENING FOR CHOLESTEROL           Clinical Reference Information           Allergies as of 5/15/2017     No Known Allergies      Immunizations Administered on Date of Encounter - 5/15/2017     None      Language Assistance Services     ATTENTION: Language assistance services are available, free of charge. Please call [phone].      ATENCIÓN: Si chrissiela wilfredokalie, tiene a roman disposición servicios gratuitos de asistencia lingüística. Llame al [phone].     HARDY Ý: N?u b?n nói Ti?ng Vi?t, có các d?ch v? h? tr? ngôn ng? mi?n phí dành cho b?n. G?i s? [phone].         [facility] complies with applicable Federal civil rights laws and does not discriminate on the basis of race, color, national origin, age, disability, or sex.        "

## 2017-05-15 NOTE — TELEPHONE ENCOUNTER
Spoke with pt wife pt is scheduled for labs and nurse visit at [facility].
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
## 2017-05-15 NOTE — TELEPHONE ENCOUNTER
I have attempted without success to contact this patient by phone left message for pt to call back.

## 2017-05-17 ENCOUNTER — OFFICE VISIT (OUTPATIENT)
Dept: SLEEP MEDICINE | Facility: CLINIC | Age: 62
End: 2017-05-17
Payer: COMMERCIAL

## 2017-05-17 VITALS
BODY MASS INDEX: 34.88 KG/M2 | SYSTOLIC BLOOD PRESSURE: 100 MMHG | DIASTOLIC BLOOD PRESSURE: 70 MMHG | HEIGHT: 63 IN | WEIGHT: 196.88 LBS | RESPIRATION RATE: 22 BRPM | HEART RATE: 77 BPM | OXYGEN SATURATION: 97 %

## 2017-05-17 DIAGNOSIS — G47.31 COMPLEX SLEEP APNEA SYNDROME: Primary | ICD-10-CM

## 2017-05-17 DIAGNOSIS — G47.37 CENTRAL SLEEP APNEA SECONDARY TO CONGESTIVE HEART FAILURE (CHF): ICD-10-CM

## 2017-05-17 DIAGNOSIS — I50.9 CENTRAL SLEEP APNEA SECONDARY TO CONGESTIVE HEART FAILURE (CHF): ICD-10-CM

## 2017-05-17 PROCEDURE — 3074F SYST BP LT 130 MM HG: CPT | Mod: S$GLB,,, | Performed by: NURSE PRACTITIONER

## 2017-05-17 PROCEDURE — 3078F DIAST BP <80 MM HG: CPT | Mod: S$GLB,,, | Performed by: NURSE PRACTITIONER

## 2017-05-17 PROCEDURE — 1160F RVW MEDS BY RX/DR IN RCRD: CPT | Mod: S$GLB,,, | Performed by: NURSE PRACTITIONER

## 2017-05-17 PROCEDURE — 99214 OFFICE O/P EST MOD 30 MIN: CPT | Mod: S$GLB,,, | Performed by: NURSE PRACTITIONER

## 2017-05-17 PROCEDURE — 99999 PR PBB SHADOW E&M-EST. PATIENT-LVL IV: CPT | Mod: PBBFAC,,, | Performed by: NURSE PRACTITIONER

## 2017-05-17 NOTE — LETTER
May 17, 2017      Sandra Estevez MD  [address] [place] [address]           [facility]  [address]  [place] [address]  Phone: [phone]          Patient: Yan Choudhury   MR Number: [id number]   YOB: 1955   Date of Visit: 5/17/2017       Dear Dr. Sandra Estevez:    Thank you for referring Yan Choudhury to me for evaluation. Attached you will find relevant portions of my assessment and plan of care.    If you have questions, please do not hesitate to call me. I look forward to following Yan Choudhury along with you.    Sincerely,    Elizabeth Lejeune, AVERY    Enclosure  CC:  No Recipients    If you would like to receive this communication electronically, please contact [email or web address] or [phone] to request more information on [employer] access.    For providers and/or their staff who would like to refer a patient to [facility], please contact us through our one-stop-shop provider referral line, [facility], at [phone].    If you feel you have received this communication in error or would no longer like to receive these types of communications, please e-mail [email or web address]
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
## 2017-05-17 NOTE — MR AVS SNAPSHOT
[facility]  [address] [place] [address]  Phone: [phone]                  Yan Choudhury   2017 1:40 PM   Office Visit    Description:  Male : 1955   Provider:  Elizabeth Lejeune, NP   Department:  [facility]           Reason for Visit     Sleep Apnea     Shortness of Breath           Diagnoses this Visit        Comments    Complex sleep apnea syndrome    -  Primary     Central sleep apnea secondary to congestive heart failure (CHF)                To Do List           Future Appointments        Provider Department Dept Phone    2017 2:00 PM [facility] [phone]    2017 2:30 PM LABORATORY, [address] [facility] [phone]    2017 1:00 PM Daniel Arriaga MD [facility] Pulmonary Services [phone]    2017 3:00 PM Otilia Prado RD, [address] [facility] - Diabetes Management [phone]    2017 11:00 AM LOKI Araiza MD [facility] Ophthalmology [phone]      Goals (5 Years of Data)     None      [facility]     [facility] -  Assistance  Unless otherwise directed by your provider, please contact [facility], our nurse care line that is available for  assistance.     Registered nurses in the [facility] provide: appointment scheduling, clinical advisement, health education, and other advisory services.  Call: [phone] (toll free)               Medications           Message regarding Medications     Verify the changes and/or additions to your medication regime listed below are the same as discussed with your clinician today.  If any of these changes or additions are incorrect, please notify your healthcare provider.             Verify that the below list of medications is an accurate representation of the medications you are currently taking.  If none reported, the list may be blank. If incorrect, please contact your healthcare  "provider. Carry this list with you in case of emergency.           Current Medications     allopurinol (ZYLOPRIM) 100 MG tablet TAKE 1 TABLET (100 MG TOTAL) BY MOUTH ONCE DAILY.    aspirin (ECOTRIN) 81 MG EC tablet Take 81 mg by mouth once daily.    BD INSULIN SYRINGE ULTRA-FINE 1/2 mL 31 x 5/16" Syrg USE AS DIRECTED TO INJECT INSULIN TWO TIMES DAILY    bumetanide (BUMEX) 2 MG tablet TAKE 1 TABLET (2 MG TOTAL) BY MOUTH 2 (TWO) TIMES DAILY.    carvedilol (COREG) 25 MG tablet Take 12.5 mg by mouth 2 (two) times daily with meals.    guaifenesin (MUCINEX) 600 mg 12 hr tablet Take 1,200 mg by mouth 2 (two) times daily as needed.     insulin glargine (LANTUS) 100 unit/mL injection 50 units bid    insulin needles, disposable, (BD ULTRA-FINE JOSLYN PEN NEEDLES) 32 x 5/32 " Ndle 1 each by Misc.(Non-Drug; Combo Route) route 4 (four) times daily.    IRON/VITAMIN B COMPLEX (GERITOL ORAL) Take by mouth.    lisinopril (PRINIVIL,ZESTRIL) 5 MG tablet Take 1 tablet (5 mg total) by mouth once daily.    metOLazone (ZAROXOLYN) 2.5 MG tablet TAKE 1 TABLET (2.5 MG TOTAL) BY MOUTH ONCE DAILY. TAKE 30 MINUTES BEFORE AM DOSE OF BUMEX    pantoprazole (PROTONIX) 40 MG tablet TAKE 1 TABLET BY MOUTH EVERY DAY FOR ACID REFLUX    potassium chloride SA (K-DUR,KLOR-CON) 10 MEQ tablet TAKE 1 TABLET BY MOUTH EVERY DAY    simvastatin (ZOCOR) 10 MG tablet TAKE 1 TABLET BY MOUTH EVERY EVENING FOR CHOLESTEROL           Clinical Reference Information           Your Vitals Were     BP Pulse Resp Height Weight SpO2    100/70 (BP Location: Right arm, Patient Position: Sitting, BP Method: Manual) 77 22 5' 3" (1.6 m) 89.3 kg (196 lb 13.9 oz) 97%    BMI                34.87 kg/m2          Blood Pressure          Most Recent Value    BP  100/70      Allergies as of 5/17/2017     No Known Allergies      Immunizations Administered on Date of Encounter - 5/17/2017     None      Orders Placed During Today's Visit      Normal Orders This Visit    CPAP FOR HOME USE     " OXYGEN FOR HOME USE       Language Assistance Services     ATTENTION: Language assistance services are available, free of charge. Please call [phone].      ATENCIÓN: Si habla sahara, tiene a roman disposición servicios gratuitos de asistencia lingüística. Llame al [phone].     CHÚ Ý: N?u b?n nói Ti?ng Vi?t, có các d?ch v? h? tr? ngôn ng? mi?n phí dành cho b?n. G?i s? [phone].         [facility] complies with applicable Federal civil rights laws and does not discriminate on the basis of race, color, national origin, age, disability, or sex.

## 2017-05-18 ENCOUNTER — LAB VISIT (OUTPATIENT)
Dept: LAB | Facility: HOSPITAL | Age: 62
End: 2017-05-18
Attending: INTERNAL MEDICINE
Payer: COMMERCIAL

## 2017-05-18 ENCOUNTER — CLINICAL SUPPORT (OUTPATIENT)
Dept: CARDIOLOGY | Facility: CLINIC | Age: 62
End: 2017-05-18
Payer: COMMERCIAL

## 2017-05-18 ENCOUNTER — TELEPHONE (OUTPATIENT)
Dept: CARDIOLOGY | Facility: CLINIC | Age: 62
End: 2017-05-18

## 2017-05-18 DIAGNOSIS — I50.43 ACUTE ON CHRONIC COMBINED SYSTOLIC AND DIASTOLIC CONGESTIVE HEART FAILURE: Primary | ICD-10-CM

## 2017-05-18 DIAGNOSIS — I50.22 CHRONIC SYSTOLIC CONGESTIVE HEART FAILURE: ICD-10-CM

## 2017-05-18 DIAGNOSIS — I50.9 CONGESTIVE HEART FAILURE, UNSPECIFIED CONGESTIVE HEART FAILURE CHRONICITY, UNSPECIFIED CONGESTIVE HEART FAILURE TYPE: Primary | ICD-10-CM

## 2017-05-18 LAB
ANION GAP SERPL CALC-SCNC: 10 MMOL/L
BUN SERPL-MCNC: 77 MG/DL
CALCIUM SERPL-MCNC: 9.9 MG/DL
CHLORIDE SERPL-SCNC: 95 MMOL/L
CO2 SERPL-SCNC: 29 MMOL/L
CREAT SERPL-MCNC: 2.7 MG/DL
EST. GFR  (AFRICAN AMERICAN): 28 ML/MIN/1.73 M^2
EST. GFR  (NON AFRICAN AMERICAN): 24 ML/MIN/1.73 M^2
GLUCOSE SERPL-MCNC: 83 MG/DL
POTASSIUM SERPL-SCNC: 3.9 MMOL/L
SODIUM SERPL-SCNC: 134 MMOL/L

## 2017-05-18 PROCEDURE — 80048 BASIC METABOLIC PNL TOTAL CA: CPT

## 2017-05-18 PROCEDURE — 36415 COLL VENOUS BLD VENIPUNCTURE: CPT

## 2017-05-18 NOTE — TELEPHONE ENCOUNTER
Spoke with pt wife notified her the information per Jovana pt states Jovana went over information and pt wife verbalized understanding. Pt is scheduled to see Salvador on Monday 5/22/17 with same day labs [facility].

## 2017-05-18 NOTE — TELEPHONE ENCOUNTER
----- Message from JORGE Dhillon sent at 5/18/2017  3:26 PM CDT -----  I phoned patient. His creatinine has increased to 2.7. I advised to hold Bumex and Metolazone and well as K supplement through the weekend. He needs to come in on Monday for BMP and appointment, please arrange    Patient took 3 extra doses of metolazone on his own due to fluid overload    Thanks

## 2017-05-21 PROCEDURE — 99495 TRANSJ CARE MGMT MOD F2F 14D: CPT | Mod: S$GLB,,, | Performed by: FAMILY MEDICINE

## 2017-05-22 ENCOUNTER — TELEPHONE (OUTPATIENT)
Dept: CARDIOLOGY | Facility: CLINIC | Age: 62
End: 2017-05-22

## 2017-05-22 ENCOUNTER — OFFICE VISIT (OUTPATIENT)
Dept: CARDIOLOGY | Facility: CLINIC | Age: 62
End: 2017-05-22
Payer: COMMERCIAL

## 2017-05-22 ENCOUNTER — LAB VISIT (OUTPATIENT)
Dept: LAB | Facility: HOSPITAL | Age: 62
End: 2017-05-22
Attending: INTERNAL MEDICINE
Payer: COMMERCIAL

## 2017-05-22 VITALS
HEART RATE: 80 BPM | DIASTOLIC BLOOD PRESSURE: 76 MMHG | BODY MASS INDEX: 30.8 KG/M2 | WEIGHT: 203.25 LBS | HEIGHT: 68 IN | SYSTOLIC BLOOD PRESSURE: 122 MMHG

## 2017-05-22 DIAGNOSIS — I27.20 PULMONARY HTN: ICD-10-CM

## 2017-05-22 DIAGNOSIS — Z93.3 COLOSTOMY IN PLACE: ICD-10-CM

## 2017-05-22 DIAGNOSIS — I50.9 CONGESTIVE HEART FAILURE, UNSPECIFIED CONGESTIVE HEART FAILURE CHRONICITY, UNSPECIFIED CONGESTIVE HEART FAILURE TYPE: ICD-10-CM

## 2017-05-22 DIAGNOSIS — E11.59 HYPERTENSION ASSOCIATED WITH DIABETES: ICD-10-CM

## 2017-05-22 DIAGNOSIS — I50.40: Primary | ICD-10-CM

## 2017-05-22 DIAGNOSIS — I25.10 CAD, MULTIPLE VESSEL: Chronic | ICD-10-CM

## 2017-05-22 DIAGNOSIS — I50.9 ACUTE ON CHRONIC CONGESTIVE HEART FAILURE, UNSPECIFIED CONGESTIVE HEART FAILURE TYPE: ICD-10-CM

## 2017-05-22 DIAGNOSIS — I15.2 HYPERTENSION ASSOCIATED WITH DIABETES: ICD-10-CM

## 2017-05-22 DIAGNOSIS — G47.33 OSA (OBSTRUCTIVE SLEEP APNEA): ICD-10-CM

## 2017-05-22 DIAGNOSIS — N18.30 STAGE 3 CHRONIC KIDNEY DISEASE: ICD-10-CM

## 2017-05-22 DIAGNOSIS — N18.30 TYPE 2 DIABETES MELLITUS WITH STAGE 3 CHRONIC KIDNEY DISEASE, WITH LONG-TERM CURRENT USE OF INSULIN: ICD-10-CM

## 2017-05-22 DIAGNOSIS — Z79.4 TYPE 2 DIABETES MELLITUS WITH STAGE 3 CHRONIC KIDNEY DISEASE, WITH LONG-TERM CURRENT USE OF INSULIN: ICD-10-CM

## 2017-05-22 DIAGNOSIS — I44.7 LBBB (LEFT BUNDLE BRANCH BLOCK): ICD-10-CM

## 2017-05-22 DIAGNOSIS — I25.5 ISCHEMIC CARDIOMYOPATHY: Primary | ICD-10-CM

## 2017-05-22 DIAGNOSIS — I50.42 CHRONIC COMBINED SYSTOLIC AND DIASTOLIC HEART FAILURE: Chronic | ICD-10-CM

## 2017-05-22 DIAGNOSIS — Z95.810 BIVENTRICULAR ICD (IMPLANTABLE CARDIOVERTER-DEFIBRILLATOR) IN PLACE: Chronic | ICD-10-CM

## 2017-05-22 DIAGNOSIS — E11.22 TYPE 2 DIABETES MELLITUS WITH STAGE 3 CHRONIC KIDNEY DISEASE, WITH LONG-TERM CURRENT USE OF INSULIN: ICD-10-CM

## 2017-05-22 LAB
ANION GAP SERPL CALC-SCNC: 5 MMOL/L
BUN SERPL-MCNC: 51 MG/DL
CALCIUM SERPL-MCNC: 9.5 MG/DL
CHLORIDE SERPL-SCNC: 106 MMOL/L
CO2 SERPL-SCNC: 29 MMOL/L
CREAT SERPL-MCNC: 1.8 MG/DL
EST. GFR  (AFRICAN AMERICAN): 45 ML/MIN/1.73 M^2
EST. GFR  (NON AFRICAN AMERICAN): 39 ML/MIN/1.73 M^2
GLUCOSE SERPL-MCNC: 93 MG/DL
POTASSIUM SERPL-SCNC: 4.4 MMOL/L
SODIUM SERPL-SCNC: 140 MMOL/L

## 2017-05-22 PROCEDURE — 99999 PR PBB SHADOW E&M-EST. PATIENT-LVL III: CPT | Mod: PBBFAC,,, | Performed by: NURSE PRACTITIONER

## 2017-05-22 PROCEDURE — 3044F HG A1C LEVEL LT 7.0%: CPT | Mod: S$GLB,,, | Performed by: NURSE PRACTITIONER

## 2017-05-22 PROCEDURE — 99214 OFFICE O/P EST MOD 30 MIN: CPT | Mod: S$GLB,,, | Performed by: NURSE PRACTITIONER

## 2017-05-22 PROCEDURE — 3078F DIAST BP <80 MM HG: CPT | Mod: S$GLB,,, | Performed by: NURSE PRACTITIONER

## 2017-05-22 PROCEDURE — 1160F RVW MEDS BY RX/DR IN RCRD: CPT | Mod: S$GLB,,, | Performed by: NURSE PRACTITIONER

## 2017-05-22 PROCEDURE — 3074F SYST BP LT 130 MM HG: CPT | Mod: S$GLB,,, | Performed by: NURSE PRACTITIONER

## 2017-05-22 PROCEDURE — 3066F NEPHROPATHY DOC TX: CPT | Mod: S$GLB,,, | Performed by: NURSE PRACTITIONER

## 2017-05-22 PROCEDURE — 80048 BASIC METABOLIC PNL TOTAL CA: CPT

## 2017-05-22 PROCEDURE — 36415 COLL VENOUS BLD VENIPUNCTURE: CPT

## 2017-05-22 RX ORDER — BUMETANIDE 2 MG/1
2 TABLET ORAL DAILY
Qty: 60 TABLET | Refills: 8
Start: 2017-05-22 | End: 2017-08-03 | Stop reason: SDUPTHER

## 2017-05-22 NOTE — TELEPHONE ENCOUNTER
Renal function has returned to baseline since holding diuretics. Please instruct patient to resume Bumex  2mg daily and use Metolazone PRN ONLY as instructed by provider .  Resume KCL supplement daily  Resume Coreg 12.5mg BID and ok to continue Lisinopril  5mg daily   RTC in 1 month for CHF clinic with BMP and BNP same day as appt. Come 1 hour early for labs

## 2017-05-22 NOTE — PROGRESS NOTES
Subjective:   Patient ID:  Yan Choudhury is a 62 y.o. male who presents for follow up of Congestive Heart Failure (f/u)      HPI      Patient presents to clinic for CHF symptom check. He has PMH of CAD/ICM EF 30% (s/p [facility] 2009, diffuse CAD), DCM, LBBB, HTN, CRI, DM, dyslipidemia, and obesity . He was admitted in April 2017 with decompensated HF. Was dieresed and discharged home. He followed up in clinic with Jovana and had evidence of fluid still on board. He was noted to have increased interstitial markings on CXR. Admitted that he had been eating high salt diet. His BNP was bumped, so metolazone added. He was instructed to take 4 total doses of 2.5mg. Followed up for nurse visit and was noted to BROOKE after taking total of 7 doses of Metolazone. Patient given instructions to hold his diuretics and K supp for 3 days after having bump in creatine from 1.7 to 2.7. Patient states that he has not been taking Coreg as well. He has continued to take Lisinopril. He denies any chest pain, edema, orthopnea, PND, dizziness, near syncope or syncope. Has chronic SOB that is unchanged. No abdominal bloating or weight gain. Has not had ICD firing. BP running 120's-130's /70's. -80's.  No CNS complaints to suggest TIA or CVA    Past Medical History:   Diagnosis Date    Arthritis     CAD (coronary artery disease)     Cataract     CHF (congestive heart failure)     Dr Calvillo    Chronic combined systolic and diastolic congestive heart failure 3/24/2015    CKD (chronic kidney disease), stage III     Diabetes mellitus type II      AM    Diabetic retinopathy     anti Veg F injections for macular edema    Diverticulitis of colon     ED (erectile dysfunction)     Glaucoma     HTN (hypertension)     Hyperlipidemia     Ischemic cardiomyopathy     Obesity     Pulmonary HTN        Past Surgical History:   Procedure Laterality Date    CARDIAC CATHETERIZATION  2009    CHOLECYSTECTOMY      COLONOSCOPY      EYE SURGERY    "   KNEE SURGERY         Social History   Substance Use Topics    Smoking status: Never Smoker    Smokeless tobacco: Never Used    Alcohol use 0.0 oz/week      Comment: " once in awhile...special occassions"       Family History   Problem Relation Age of Onset    Cancer Mother     Heart disease Father     Stroke Father     No Known Problems Sister     No Known Problems Brother     No Known Problems Maternal Aunt     No Known Problems Maternal Uncle     No Known Problems Paternal Aunt     No Known Problems Paternal Uncle     No Known Problems Maternal Grandmother     No Known Problems Maternal Grandfather     No Known Problems Paternal Grandmother     No Known Problems Paternal Grandfather     Amblyopia Neg Hx     Blindness Neg Hx     Cataracts Neg Hx     Diabetes Neg Hx     Glaucoma Neg Hx     Hypertension Neg Hx     Macular degeneration Neg Hx     Retinal detachment Neg Hx     Strabismus Neg Hx     Thyroid disease Neg Hx        Current Outpatient Prescriptions   Medication Sig    allopurinol (ZYLOPRIM) 100 MG tablet TAKE 1 TABLET (100 MG TOTAL) BY MOUTH ONCE DAILY.    BD INSULIN SYRINGE ULTRA-FINE 1/2 mL 31 x 5/16" Syrg USE AS DIRECTED TO INJECT INSULIN TWO TIMES DAILY    carvedilol (COREG) 25 MG tablet Take 25 mg by mouth 2 (two) times daily with meals. Takes one-half tablet by mouth twice a day    guaifenesin (MUCINEX) 600 mg 12 hr tablet Take 1,200 mg by mouth 2 (two) times daily as needed.     insulin glargine (LANTUS) 100 unit/mL injection 50 units bid    IRON/VITAMIN B COMPLEX (GERITOL ORAL) Take by mouth.    lisinopril (PRINIVIL,ZESTRIL) 5 MG tablet Take 1 tablet (5 mg total) by mouth once daily.    pantoprazole (PROTONIX) 40 MG tablet TAKE 1 TABLET BY MOUTH EVERY DAY FOR ACID REFLUX    simvastatin (ZOCOR) 10 MG tablet TAKE 1 TABLET BY MOUTH EVERY EVENING FOR CHOLESTEROL    aspirin (ECOTRIN) 81 MG EC tablet Take 81 mg by mouth once daily.    bumetanide (BUMEX) 2 MG tablet " "TAKE 1 TABLET (2 MG TOTAL) BY MOUTH 2 (TWO) TIMES DAILY.    insulin needles, disposable, (BD ULTRA-FINE JOSLYN PEN NEEDLES) 32 x 5/32 " Ndle 1 each by Misc.(Non-Drug; Combo Route) route 4 (four) times daily.    potassium chloride SA (K-DUR,KLOR-CON) 10 MEQ tablet TAKE 1 TABLET BY MOUTH EVERY DAY     No current facility-administered medications for this visit.      Current Outpatient Prescriptions on File Prior to Visit   Medication Sig    allopurinol (ZYLOPRIM) 100 MG tablet TAKE 1 TABLET (100 MG TOTAL) BY MOUTH ONCE DAILY.    BD INSULIN SYRINGE ULTRA-FINE 1/2 mL 31 x 5/16" Syrg USE AS DIRECTED TO INJECT INSULIN TWO TIMES DAILY    carvedilol (COREG) 25 MG tablet Take 25 mg by mouth 2 (two) times daily with meals. Takes one-half tablet by mouth twice a day    guaifenesin (MUCINEX) 600 mg 12 hr tablet Take 1,200 mg by mouth 2 (two) times daily as needed.     insulin glargine (LANTUS) 100 unit/mL injection 50 units bid    IRON/VITAMIN B COMPLEX (GERITOL ORAL) Take by mouth.    lisinopril (PRINIVIL,ZESTRIL) 5 MG tablet Take 1 tablet (5 mg total) by mouth once daily.    pantoprazole (PROTONIX) 40 MG tablet TAKE 1 TABLET BY MOUTH EVERY DAY FOR ACID REFLUX    simvastatin (ZOCOR) 10 MG tablet TAKE 1 TABLET BY MOUTH EVERY EVENING FOR CHOLESTEROL    aspirin (ECOTRIN) 81 MG EC tablet Take 81 mg by mouth once daily.    bumetanide (BUMEX) 2 MG tablet TAKE 1 TABLET (2 MG TOTAL) BY MOUTH 2 (TWO) TIMES DAILY.    insulin needles, disposable, (BD ULTRA-FINE JOSLYN PEN NEEDLES) 32 x 5/32 " Ndle 1 each by Misc.(Non-Drug; Combo Route) route 4 (four) times daily.    potassium chloride SA (K-DUR,KLOR-CON) 10 MEQ tablet TAKE 1 TABLET BY MOUTH EVERY DAY     No current facility-administered medications on file prior to visit.        Review of Systems   Constitution: Positive for weight gain. Negative for decreased appetite, weakness, malaise/fatigue and weight loss.   HENT: Negative for nosebleeds.    Cardiovascular: Negative for " "chest pain, claudication, dyspnea on exertion, irregular heartbeat, leg swelling, near-syncope, orthopnea, palpitations, paroxysmal nocturnal dyspnea and syncope.   Respiratory: Positive for shortness of breath (chronic). Negative for cough, sleep disturbances due to breathing, snoring and wheezing.    Hematologic/Lymphatic: Negative for bleeding problem. Does not bruise/bleed easily.   Skin: Negative for rash.   Musculoskeletal: Negative for arthritis, back pain, falls, joint pain, joint swelling, muscle cramps, muscle weakness and myalgias.   Gastrointestinal: Negative for bloating, abdominal pain, constipation, diarrhea, heartburn, nausea and vomiting.   Genitourinary: Negative for dysuria, hematuria and nocturia.   Neurological: Negative for excessive daytime sleepiness, dizziness, light-headedness, loss of balance, numbness, paresthesias and vertigo.       Objective:   Physical Exam   Constitutional: He is oriented to person, place, and time. He appears well-developed and well-nourished.   Neck: Neck supple. No JVD present.   Cardiovascular: Normal rate, regular rhythm, normal heart sounds and normal pulses.  Exam reveals no friction rub.    No murmur heard.  Pulmonary/Chest: Effort normal and breath sounds normal. No respiratory distress. He has no wheezes. He has no rales.   Abdominal: Soft. Bowel sounds are normal. He exhibits no distension.   Colostomy      Musculoskeletal: He exhibits no edema or tenderness.   Neurological: He is alert and oriented to person, place, and time.   Skin: Skin is warm and dry. No rash noted.   Psychiatric: He has a normal mood and affect. His behavior is normal.   Nursing note and vitals reviewed.    Vitals:    05/22/17 1418   BP: 122/76   Pulse: 80   Weight: 92.2 kg (203 lb 4.2 oz)   Height: 5' 8" (1.727 m)     Lab Results   Component Value Date    CHOL 112 (L) 04/20/2017    CHOL 107 (L) 11/17/2016    CHOL 105 (L) 05/17/2016     Lab Results   Component Value Date    HDL 39 (L) " 04/20/2017    HDL 31 (L) 11/17/2016    HDL 20 (L) 05/17/2016     Lab Results   Component Value Date    LDLCALC 58.6 (L) 04/20/2017    LDLCALC 58.2 (L) 11/17/2016    LDLCALC 60.0 (L) 05/17/2016     Lab Results   Component Value Date    TRIG 72 04/20/2017    TRIG 89 11/17/2016    TRIG 125 05/17/2016     Lab Results   Component Value Date    CHOLHDL 34.8 04/20/2017    CHOLHDL 29.0 11/17/2016    CHOLHDL 19.0 (L) 05/17/2016       Chemistry        Component Value Date/Time     (L) 05/18/2017 1403    K 3.9 05/18/2017 1403    CL 95 05/18/2017 1403    CO2 29 05/18/2017 1403    BUN 77 (H) 05/18/2017 1403    CREATININE 2.7 (H) 05/18/2017 1403    GLU 83 05/18/2017 1403        Component Value Date/Time    CALCIUM 9.9 05/18/2017 1403    ALKPHOS 111 05/02/2017 1608    AST 26 05/02/2017 1608    ALT 11 05/02/2017 1608    BILITOT 1.0 05/02/2017 1608          Lab Results   Component Value Date    TSH 4.317 (H) 05/17/2016     Lab Results   Component Value Date    INR 1.1 04/19/2017    INR 1.4 (H) 03/22/2016    INR 1.4 (H) 12/15/2015     Lab Results   Component Value Date    WBC 7.80 05/02/2017    HGB 13.3 (L) 05/02/2017    HCT 41.8 05/02/2017    MCV 88 05/02/2017     05/02/2017     BMP  Sodium   Date Value Ref Range Status   05/18/2017 134 (L) 136 - 145 mmol/L Final     Potassium   Date Value Ref Range Status   05/18/2017 3.9 3.5 - 5.1 mmol/L Final     Chloride   Date Value Ref Range Status   05/18/2017 95 95 - 110 mmol/L Final     CO2   Date Value Ref Range Status   05/18/2017 29 23 - 29 mmol/L Final     BUN, Bld   Date Value Ref Range Status   05/18/2017 77 (H) 8 - 23 mg/dL Final     Creatinine   Date Value Ref Range Status   05/18/2017 2.7 (H) 0.5 - 1.4 mg/dL Final     Calcium   Date Value Ref Range Status   05/18/2017 9.9 8.7 - 10.5 mg/dL Final     Anion Gap   Date Value Ref Range Status   05/18/2017 10 8 - 16 mmol/L Final     eGFR if    Date Value Ref Range Status   05/18/2017 28 (A) >60 mL/min/1.73  m^2 Final     eGFR if non    Date Value Ref Range Status   05/18/2017 24 (A) >60 mL/min/1.73 m^2 Final     Comment:     Calculation used to obtain the estimated glomerular filtration  rate (eGFR) is the CKD-EPI equation. Since race is unknown   in our information system, the eGFR values for   -American and Non--American patients are given   for each creatinine result.       Estimated Creatinine Clearance: 30.5 mL/min (based on Cr of 2.7).      CONCLUSIONS     1 - Mild left atrial enlargement.     2 - Mild left ventricular enlargement.     3 - Eccentric hypertrophy.     4 - Moderately depressed left ventricular systolic function (EF 30-35%).     5 - Left ventricular diastolic dysfunction.     6 - Low normal right ventricular systolic function .     7 - Moderate aortic regurgitation.     8 - Moderate tricuspid regurgitation.     9 - Pulmonary hypertension. The estimated PA systolic pressure is 59 mmHg.             This document has been electronically    SIGNED BY: Christopher Abrams MD On: 04/10/2017 16:00    Assessment:     1. Ischemic cardiomyopathy    2. JAHAIRA (obstructive sleep apnea)    3. Pulmonary HTN    4. LBBB (left bundle branch block)    5. Hypertension associated with diabetes    6. Chronic combined systolic and diastolic heart failure    7. CAD, multiple vessel    8. Type 2 diabetes mellitus with stage 3 chronic kidney disease, with long-term current use of insulin    9. Stage 3 chronic kidney disease    10. Biventricular ICD (implantable cardioverter-defibrillator) in place    11. Colostomy in place    Renal function has returned to baseline since holding diuretics   No evidence of volume overload on exam  Stable chronic SOB  BP stable  No CNS complaints to suggest TIA or CVA  No angina or equivalent  No ICD firing    Plan:   Bumex  2mg daily and use Metolazone PRN ONLY as instructed by provider   Resume KCL supplement daily  Resume Coreg 12.5mg BID and ok to continue  Lisinopril  5mg daily   RTC in 1 month for CHF clinic with BMP and BNP same day as appt. Come 1 hour early for labs

## 2017-05-22 NOTE — PROGRESS NOTES
Labs reviewed. Patient had taken 3 extra doses of metolazone on his own. Advised to hold diuretics and K supplement through weekend. Repeat labs scheduled for Monday.

## 2017-05-23 NOTE — TELEPHONE ENCOUNTER
Spoke with pt wife notified her the information pt wife woke down instructions and repeated back. I scheduled pt for 1 month follow up with labs same day 1 hour before appt letter mailed.

## 2017-05-25 RX ORDER — PEN NEEDLE, DIABETIC 29 G X1/2"
NEEDLE, DISPOSABLE MISCELLANEOUS
Qty: 60 EACH | Refills: 11 | Status: SHIPPED | OUTPATIENT
Start: 2017-05-25 | End: 2018-05-28 | Stop reason: SDUPTHER

## 2017-06-06 ENCOUNTER — TELEPHONE (OUTPATIENT)
Dept: INTERNAL MEDICINE | Facility: CLINIC | Age: 62
End: 2017-06-06

## 2017-06-06 NOTE — TELEPHONE ENCOUNTER
----- Message from Citlaly Hogan sent at 6/6/2017  2:05 PM CDT -----  [phone] ext [phone]  Would like to follow up on paperwork sent for patient. Please call back at [phone]. Ext [phone].    Thanks,  Citlaly Hogan

## 2017-06-22 ENCOUNTER — OFFICE VISIT (OUTPATIENT)
Dept: CARDIOLOGY | Facility: CLINIC | Age: 62
End: 2017-06-22
Payer: COMMERCIAL

## 2017-06-22 ENCOUNTER — LAB VISIT (OUTPATIENT)
Dept: LAB | Facility: HOSPITAL | Age: 62
End: 2017-06-22
Attending: NURSE PRACTITIONER
Payer: COMMERCIAL

## 2017-06-22 VITALS
SYSTOLIC BLOOD PRESSURE: 110 MMHG | WEIGHT: 205.94 LBS | HEIGHT: 68 IN | BODY MASS INDEX: 31.21 KG/M2 | OXYGEN SATURATION: 97 % | HEART RATE: 61 BPM | DIASTOLIC BLOOD PRESSURE: 64 MMHG

## 2017-06-22 DIAGNOSIS — E11.59 HYPERTENSION ASSOCIATED WITH DIABETES: ICD-10-CM

## 2017-06-22 DIAGNOSIS — G47.33 OSA (OBSTRUCTIVE SLEEP APNEA): ICD-10-CM

## 2017-06-22 DIAGNOSIS — I25.5 ISCHEMIC CARDIOMYOPATHY: ICD-10-CM

## 2017-06-22 DIAGNOSIS — N18.30 STAGE 3 CHRONIC KIDNEY DISEASE: ICD-10-CM

## 2017-06-22 DIAGNOSIS — I27.20 PULMONARY HTN: ICD-10-CM

## 2017-06-22 DIAGNOSIS — I50.40: ICD-10-CM

## 2017-06-22 DIAGNOSIS — E11.69 HYPERLIPIDEMIA ASSOCIATED WITH TYPE 2 DIABETES MELLITUS: ICD-10-CM

## 2017-06-22 DIAGNOSIS — Z95.810 BIVENTRICULAR ICD (IMPLANTABLE CARDIOVERTER-DEFIBRILLATOR) IN PLACE: Chronic | ICD-10-CM

## 2017-06-22 DIAGNOSIS — Z93.3 COLOSTOMY IN PLACE: ICD-10-CM

## 2017-06-22 DIAGNOSIS — I25.10 CAD, MULTIPLE VESSEL: Chronic | ICD-10-CM

## 2017-06-22 DIAGNOSIS — I15.2 HYPERTENSION ASSOCIATED WITH DIABETES: ICD-10-CM

## 2017-06-22 DIAGNOSIS — I44.7 LBBB (LEFT BUNDLE BRANCH BLOCK): ICD-10-CM

## 2017-06-22 DIAGNOSIS — I50.42 CHRONIC COMBINED SYSTOLIC AND DIASTOLIC HEART FAILURE: Primary | Chronic | ICD-10-CM

## 2017-06-22 DIAGNOSIS — E78.5 HYPERLIPIDEMIA ASSOCIATED WITH TYPE 2 DIABETES MELLITUS: ICD-10-CM

## 2017-06-22 LAB
ANION GAP SERPL CALC-SCNC: 10 MMOL/L
BNP SERPL-MCNC: 650 PG/ML
BUN SERPL-MCNC: 37 MG/DL
CALCIUM SERPL-MCNC: 9 MG/DL
CHLORIDE SERPL-SCNC: 106 MMOL/L
CO2 SERPL-SCNC: 26 MMOL/L
CREAT SERPL-MCNC: 2 MG/DL
EST. GFR  (AFRICAN AMERICAN): 40 ML/MIN/1.73 M^2
EST. GFR  (NON AFRICAN AMERICAN): 34 ML/MIN/1.73 M^2
GLUCOSE SERPL-MCNC: 78 MG/DL
POTASSIUM SERPL-SCNC: 4.1 MMOL/L
SODIUM SERPL-SCNC: 142 MMOL/L

## 2017-06-22 PROCEDURE — 99214 OFFICE O/P EST MOD 30 MIN: CPT | Mod: S$GLB,,, | Performed by: NURSE PRACTITIONER

## 2017-06-22 PROCEDURE — 3066F NEPHROPATHY DOC TX: CPT | Mod: S$GLB,,, | Performed by: NURSE PRACTITIONER

## 2017-06-22 PROCEDURE — 3044F HG A1C LEVEL LT 7.0%: CPT | Mod: S$GLB,,, | Performed by: NURSE PRACTITIONER

## 2017-06-22 PROCEDURE — 83880 ASSAY OF NATRIURETIC PEPTIDE: CPT

## 2017-06-22 PROCEDURE — 99999 PR PBB SHADOW E&M-EST. PATIENT-LVL IV: CPT | Mod: PBBFAC,,, | Performed by: NURSE PRACTITIONER

## 2017-06-22 PROCEDURE — 80048 BASIC METABOLIC PNL TOTAL CA: CPT

## 2017-06-22 PROCEDURE — 36415 COLL VENOUS BLD VENIPUNCTURE: CPT

## 2017-06-22 NOTE — PROGRESS NOTES
"Subjective:   Patient ID:  Yan Choudhury is a 62 y.o. male who presents for follow up of Congestive Heart Failure      HPI  Patient presents to clinic for follow up and management of ischemic cardiomyopathy. He states that he has been doing well since last visit. He denies any chest pain, angina, edema, orthopnea, PND, dizziness, near syncope or syncope. Has chronic SOB that is unchanged. No abdominal bloating or weight gain. Has not had ICD firing. Is limiting his sodium intake. Currently taking Bumex 2mg daily and taking extra tab PRN. Last extra dose of Bumex was 3 days ago. She gave him Bumex 2mg BID x 3 days for weight gain and decreased dose back to once daily when his weight went back to his baseline.     Past Medical History:   Diagnosis Date    Arthritis     CAD (coronary artery disease)     Cataract     CHF (congestive heart failure)     Dr Calvillo    Chronic combined systolic and diastolic congestive heart failure 3/24/2015    CKD (chronic kidney disease), stage III     Diabetes mellitus type II      AM    Diabetic retinopathy     anti Veg F injections for macular edema    Diverticulitis of colon     ED (erectile dysfunction)     Glaucoma     HTN (hypertension)     Hyperlipidemia     Ischemic cardiomyopathy     Obesity     Pulmonary HTN        Past Surgical History:   Procedure Laterality Date    CARDIAC CATHETERIZATION  2009    CHOLECYSTECTOMY      COLONOSCOPY      EYE SURGERY      KNEE SURGERY         Social History   Substance Use Topics    Smoking status: Never Smoker    Smokeless tobacco: Never Used    Alcohol use 0.0 oz/week      Comment: " once in awhile...special occassions"       Family History   Problem Relation Age of Onset    Cancer Mother     Heart disease Father     Stroke Father     No Known Problems Sister     No Known Problems Brother     No Known Problems Maternal Aunt     No Known Problems Maternal Uncle     No Known Problems Paternal Aunt     No " "Known Problems Paternal Uncle     No Known Problems Maternal Grandmother     No Known Problems Maternal Grandfather     No Known Problems Paternal Grandmother     No Known Problems Paternal Grandfather     Amblyopia Neg Hx     Blindness Neg Hx     Cataracts Neg Hx     Diabetes Neg Hx     Glaucoma Neg Hx     Hypertension Neg Hx     Macular degeneration Neg Hx     Retinal detachment Neg Hx     Strabismus Neg Hx     Thyroid disease Neg Hx        Current Outpatient Prescriptions   Medication Sig    allopurinol (ZYLOPRIM) 100 MG tablet TAKE 1 TABLET (100 MG TOTAL) BY MOUTH ONCE DAILY.    aspirin (ECOTRIN) 81 MG EC tablet Take 81 mg by mouth once daily.    BD INSULIN SYRINGE ULTRA-FINE 0.5 mL 31 gauge x 5/16 Syrg USE AS DIRECTED TO INJECT INSULIN TWO TIMES DAILY    bumetanide (BUMEX) 2 MG tablet Take 1 tablet (2 mg total) by mouth once daily.    carvedilol (COREG) 25 MG tablet Take 12.5 mg by mouth 2 (two) times daily with meals. Takes one-half tablet by mouth twice a day    guaifenesin (MUCINEX) 600 mg 12 hr tablet Take 1,200 mg by mouth 2 (two) times daily as needed.     insulin glargine (LANTUS) 100 unit/mL injection 50 units bid    insulin needles, disposable, (BD ULTRA-FINE JOSLYN PEN NEEDLES) 32 x 5/32 " Ndle 1 each by Misc.(Non-Drug; Combo Route) route 4 (four) times daily.    IRON/VITAMIN B COMPLEX (GERITOL ORAL) Take by mouth.    lisinopril (PRINIVIL,ZESTRIL) 5 MG tablet Take 1 tablet (5 mg total) by mouth once daily.    pantoprazole (PROTONIX) 40 MG tablet TAKE 1 TABLET BY MOUTH EVERY DAY FOR ACID REFLUX    potassium chloride SA (K-DUR,KLOR-CON) 10 MEQ tablet TAKE 1 TABLET BY MOUTH EVERY DAY    simvastatin (ZOCOR) 10 MG tablet TAKE 1 TABLET BY MOUTH EVERY EVENING FOR CHOLESTEROL     No current facility-administered medications for this visit.      Current Outpatient Prescriptions on File Prior to Visit   Medication Sig    allopurinol (ZYLOPRIM) 100 MG tablet TAKE 1 TABLET (100 MG TOTAL) " "BY MOUTH ONCE DAILY.    aspirin (ECOTRIN) 81 MG EC tablet Take 81 mg by mouth once daily.    BD INSULIN SYRINGE ULTRA-FINE 0.5 mL 31 gauge x 5/16 Syrg USE AS DIRECTED TO INJECT INSULIN TWO TIMES DAILY    bumetanide (BUMEX) 2 MG tablet Take 1 tablet (2 mg total) by mouth once daily.    carvedilol (COREG) 25 MG tablet Take 12.5 mg by mouth 2 (two) times daily with meals. Takes one-half tablet by mouth twice a day    guaifenesin (MUCINEX) 600 mg 12 hr tablet Take 1,200 mg by mouth 2 (two) times daily as needed.     insulin glargine (LANTUS) 100 unit/mL injection 50 units bid    insulin needles, disposable, (BD ULTRA-FINE JOSLYN PEN NEEDLES) 32 x 5/32 " Ndle 1 each by Misc.(Non-Drug; Combo Route) route 4 (four) times daily.    IRON/VITAMIN B COMPLEX (GERITOL ORAL) Take by mouth.    lisinopril (PRINIVIL,ZESTRIL) 5 MG tablet Take 1 tablet (5 mg total) by mouth once daily.    pantoprazole (PROTONIX) 40 MG tablet TAKE 1 TABLET BY MOUTH EVERY DAY FOR ACID REFLUX    potassium chloride SA (K-DUR,KLOR-CON) 10 MEQ tablet TAKE 1 TABLET BY MOUTH EVERY DAY    simvastatin (ZOCOR) 10 MG tablet TAKE 1 TABLET BY MOUTH EVERY EVENING FOR CHOLESTEROL     No current facility-administered medications on file prior to visit.        Review of Systems   Constitution: Negative for decreased appetite, weakness, malaise/fatigue, weight gain and weight loss.   HENT: Negative for nosebleeds.    Cardiovascular: Negative for chest pain, claudication, dyspnea on exertion, irregular heartbeat, leg swelling, near-syncope, orthopnea, palpitations, paroxysmal nocturnal dyspnea and syncope.   Respiratory: Positive for shortness of breath (chronic stable SOB). Negative for cough, sleep disturbances due to breathing, snoring and wheezing.    Hematologic/Lymphatic: Negative for bleeding problem. Does not bruise/bleed easily.   Skin: Negative for rash.   Musculoskeletal: Negative for arthritis, back pain, falls, joint pain, joint swelling, muscle " "cramps, muscle weakness and myalgias.   Gastrointestinal: Negative for bloating, abdominal pain, constipation, diarrhea, heartburn, nausea and vomiting.   Genitourinary: Negative for dysuria, hematuria and nocturia.   Neurological: Negative for excessive daytime sleepiness, dizziness, light-headedness, loss of balance, numbness, paresthesias and vertigo.       Objective:   Physical Exam   Constitutional: He is oriented to person, place, and time. He appears well-developed and well-nourished.   Neck: Neck supple. No JVD present.   Cardiovascular: Normal rate, regular rhythm, normal heart sounds and normal pulses.  Exam reveals no friction rub.    No murmur heard.  Pulmonary/Chest: Effort normal and breath sounds normal. No respiratory distress. He has no wheezes. He has no rales.   Abdominal: Soft. Bowel sounds are normal. He exhibits no distension.   Colostomy      Musculoskeletal: He exhibits no edema or tenderness.   Neurological: He is alert and oriented to person, place, and time.   Skin: Skin is warm and dry. No rash noted.   Psychiatric: He has a normal mood and affect. His behavior is normal.   Nursing note and vitals reviewed.    Vitals:    06/22/17 1350   BP: 110/64   BP Location: Right arm   Patient Position: Sitting   BP Method: Manual   Pulse: 61   SpO2: 97%   Weight: 93.4 kg (205 lb 14.6 oz)   Height: 5' 8" (1.727 m)     Lab Results   Component Value Date    CHOL 112 (L) 04/20/2017    CHOL 107 (L) 11/17/2016    CHOL 105 (L) 05/17/2016     Lab Results   Component Value Date    HDL 39 (L) 04/20/2017    HDL 31 (L) 11/17/2016    HDL 20 (L) 05/17/2016     Lab Results   Component Value Date    LDLCALC 58.6 (L) 04/20/2017    LDLCALC 58.2 (L) 11/17/2016    LDLCALC 60.0 (L) 05/17/2016     Lab Results   Component Value Date    TRIG 72 04/20/2017    TRIG 89 11/17/2016    TRIG 125 05/17/2016     Lab Results   Component Value Date    CHOLHDL 34.8 04/20/2017    CHOLHDL 29.0 11/17/2016    CHOLHDL 19.0 (L) 05/17/2016 "       Chemistry        Component Value Date/Time     05/22/2017 1400    K 4.4 05/22/2017 1400     05/22/2017 1400    CO2 29 05/22/2017 1400    BUN 51 (H) 05/22/2017 1400    CREATININE 1.8 (H) 05/22/2017 1400    GLU 93 05/22/2017 1400        Component Value Date/Time    CALCIUM 9.5 05/22/2017 1400    ALKPHOS 111 05/02/2017 1608    AST 26 05/02/2017 1608    ALT 11 05/02/2017 1608    BILITOT 1.0 05/02/2017 1608          Lab Results   Component Value Date    TSH 4.317 (H) 05/17/2016     Lab Results   Component Value Date    INR 1.1 04/19/2017    INR 1.4 (H) 03/22/2016    INR 1.4 (H) 12/15/2015     Lab Results   Component Value Date    WBC 7.80 05/02/2017    HGB 13.3 (L) 05/02/2017    HCT 41.8 05/02/2017    MCV 88 05/02/2017     05/02/2017     BMP  Sodium   Date Value Ref Range Status   05/22/2017 140 136 - 145 mmol/L Final     Potassium   Date Value Ref Range Status   05/22/2017 4.4 3.5 - 5.1 mmol/L Final     Chloride   Date Value Ref Range Status   05/22/2017 106 95 - 110 mmol/L Final     CO2   Date Value Ref Range Status   05/22/2017 29 23 - 29 mmol/L Final     BUN, Bld   Date Value Ref Range Status   05/22/2017 51 (H) 8 - 23 mg/dL Final     Creatinine   Date Value Ref Range Status   05/22/2017 1.8 (H) 0.5 - 1.4 mg/dL Final     Calcium   Date Value Ref Range Status   05/22/2017 9.5 8.7 - 10.5 mg/dL Final     Anion Gap   Date Value Ref Range Status   05/22/2017 5 (L) 8 - 16 mmol/L Final     eGFR if    Date Value Ref Range Status   05/22/2017 45 (A) >60 mL/min/1.73 m^2 Final     eGFR if non    Date Value Ref Range Status   05/22/2017 39 (A) >60 mL/min/1.73 m^2 Final     Comment:     Calculation used to obtain the estimated glomerular filtration  rate (eGFR) is the CKD-EPI equation. Since race is unknown   in our information system, the eGFR values for   -American and Non--American patients are given   for each creatinine result.       CrCl cannot be  calculated (Patient's most recent sCr result is older than the maximum 14 days allowed.).    Assessment:     1. Chronic combined systolic and diastolic heart failure    2. Pulmonary HTN    3. JAHAIRA (obstructive sleep apnea)    4. CAD, multiple vessel    5. Hypertension associated with diabetes    6. Ischemic cardiomyopathy    7. LBBB (left bundle branch block)    8. Stage 3 chronic kidney disease    9. Hyperlipidemia associated with type 2 diabetes mellitus    10. Biventricular ICD (implantable cardioverter-defibrillator) in place    11. Colostomy in place      No evidence of volume overload   BP stable  No ICD firing  Stable CAD-no angina or equivalent  BMP and BNP pending   Stable chronic SOB     Plan:     Continue current medical management for now. Will await BmP and BNP results and make further adjustments as needed.   Heart healthy diet and limit fluid intake   Remain physically active as tolerated   RTC in 3 months with same day labs

## 2017-07-06 ENCOUNTER — OFFICE VISIT (OUTPATIENT)
Dept: CARDIOLOGY | Facility: CLINIC | Age: 62
End: 2017-07-06
Payer: COMMERCIAL

## 2017-07-06 VITALS
WEIGHT: 209.31 LBS | HEIGHT: 68 IN | DIASTOLIC BLOOD PRESSURE: 62 MMHG | BODY MASS INDEX: 31.72 KG/M2 | HEART RATE: 60 BPM | SYSTOLIC BLOOD PRESSURE: 100 MMHG

## 2017-07-06 DIAGNOSIS — I25.5 ISCHEMIC CARDIOMYOPATHY: ICD-10-CM

## 2017-07-06 DIAGNOSIS — E11.59 HYPERTENSION ASSOCIATED WITH DIABETES: ICD-10-CM

## 2017-07-06 DIAGNOSIS — I42.9 CARDIOMYOPATHY, UNSPECIFIED TYPE: Primary | ICD-10-CM

## 2017-07-06 DIAGNOSIS — I15.2 HYPERTENSION ASSOCIATED WITH DIABETES: ICD-10-CM

## 2017-07-06 DIAGNOSIS — Z95.810 BIVENTRICULAR ICD (IMPLANTABLE CARDIOVERTER-DEFIBRILLATOR) IN PLACE: Chronic | ICD-10-CM

## 2017-07-06 DIAGNOSIS — I44.7 LBBB (LEFT BUNDLE BRANCH BLOCK): ICD-10-CM

## 2017-07-06 PROCEDURE — 99999 PR PBB SHADOW E&M-EST. PATIENT-LVL II: CPT | Mod: PBBFAC,,, | Performed by: INTERNAL MEDICINE

## 2017-07-06 PROCEDURE — 93000 ELECTROCARDIOGRAM COMPLETE: CPT | Mod: S$GLB,,, | Performed by: INTERNAL MEDICINE

## 2017-07-06 PROCEDURE — 99214 OFFICE O/P EST MOD 30 MIN: CPT | Mod: S$GLB,,, | Performed by: INTERNAL MEDICINE

## 2017-07-06 NOTE — PROGRESS NOTES
Subjective:    Patient ID:  Yan Choudhury is a 62 y.o. male who presents for follow-up of Cardiomyopathy      60 yoM CAD, ICM, LBBB here for post CRT-D visit.     Prior visit: He has history of CAD and ischemic cardiomyopathy. He was seen 7/15 for ICD/CRT-D consideration but has narrow complex QRS with IVCD pattern.  He has since had worsening HF symptoms. His echo 12/15 showed newly depressed EF from 40 to 25%. A PET stress revealed no reversible ischemia with EF 23%. His ECG showed LBBB with  ms. He has NYHA Class II symptoms- fatigue, dyspnea. No palpitations or syncope. He is here for CRT-D consideration. He has been on appropriate medical therapy for HF including coreg and lisinopril. He has CKD stage III, was worse last year, but never underwent dialysis, fistula placement or central access placement.     Successful CRT-D 3/24/16 without complications. He has since suffered from URI with GI upset. He is still under treatment. Device interrogation today shows normal CRT-D function with no arrhythmias.     6/16: He underwent GI surgery for diverticulitis that had to be converted to a colostomy. He continues to have drainage from his operative site in his abdomen. He has a remote monitor check set up for 7/11/16. The patient denies interval chest pain, sob, palpitations, syncope, near syncope.     Interval history: CRT-D interrogation shows no arrhythmias and normal function 5/15/17. HF symptoms stable, improved from baseline.     12/15:  CONCLUSIONS   1 - Moderate left atrial enlargement.   2 - Eccentric hypertrophy.   3 - Severely depressed left ventricular systolic function (EF 25-30%).   4 - Mildly depressed right ventricular systolic function .   5 - The estimated PA systolic pressure is 40 mmHg.   6 - Mild aortic regurgitation.   7 - Mild mitral regurgitation.   8 - Moderate tricuspid regurgitation.   9 - Trivial to mild pulmonic regurgitation.     PET Stress 2/16:  CONCLUSIONS: ABNORMAL MYOCARDIAL  PERFUSION PET STRESS TEST  1. There is a moderate sized moderate to severe intensity fixed defect consistent with non-transmural infarct with sweta-infarct ischemia in the base to distal inferior and base to distal inferolateral walls in the usual distribution of the mid Left Circumflex Artery and ostial Posterior Lateral Branch. This defect comprises 15 % of the left ventricular myocardium.   2. There is no ischemia in the Left Anterior Descending Artery territory.  3. There is abnormal wall motion at rest showing moderate global hypokinesis of the left ventricle and moderate hypokinesis of the inferior wall of the left ventricle. There is abnormal wall motion at stress showing moderate global hypokinesis of the left ventricle and severe hypokinesis of the inferior wall of the left ventricle.   4. There is resting LV dysfunction with a reduced ejection fraction of 23 %. There is stress induced LV dysfunction with a reduced ejection fraction of 34 %. (normal is >= 51%)  5. The ventricular volumes are normal at rest and stress.   6. The extracardiac distribution of radioactivity is normal.   7. There was no previous study available to compare.    Past Medical History:  No date: Arthritis  No date: CAD (coronary artery disease)  No date: Cataract  No date: CHF (congestive heart failure)      Comment: Dr Calvillo  3/24/2015: Chronic combined systolic and diastolic conges*  No date: CKD (chronic kidney disease), stage III  No date: Diabetes mellitus type II      Comment:  AM  No date: Diabetic retinopathy      Comment: anti Veg F injections for macular edema  No date: Diverticulitis of colon  No date: ED (erectile dysfunction)  No date: Glaucoma  No date: HTN (hypertension)  No date: Hyperlipidemia  No date: Ischemic cardiomyopathy  No date: Obesity  No date: Pulmonary HTN    Past Surgical History:  2009: CARDIAC CATHETERIZATION  No date: CHOLECYSTECTOMY  No date: COLONOSCOPY  No date: EYE SURGERY  No date: KNEE  "SURGERY    Social History    Marital status:              Spouse name:                       Years of education:                 Number of children: 2             Occupational History  Occupation          Employer            Comment               [employer]         Social History Main Topics    Smoking status: Never Smoker                                                                Smokeless tobacco: Never Used                        Alcohol use: Yes           0.0 oz/week       Comment: " once in awhile...special occassions"    Drug use: No              Sexual activity: Not Currently        Other Topics            Concern    None on file    Social History Narrative    . Lives with spouse. Has 2 children. 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    Review of patient's family history indicates:    Cancer                         Mother                    Heart disease                  Father                    Stroke                         Father                    No Known Problems              Sister                    No Known Problems              Brother                   No Known Problems              Maternal Aunt             No Known Problems              Maternal Uncle            No Known Problems              Paternal Aunt             No Known Problems              Paternal Uncle            No Known Problems              Maternal Grandmother      No Known Problems              Maternal Grandfather      No Known Problems              Paternal Grandmother      No Known Problems              Paternal Grandfather      Amblyopia                      Neg Hx                    Blindness                      Neg Hx                    Cataracts                      Neg Hx                    Diabetes                       Neg Hx                    Glaucoma                       Neg Hx                    Hypertension                   Neg Hx         "            Macular degeneration           Neg Hx                    Retinal detachment             Neg Hx                    Strabismus                     Neg Hx                    Thyroid disease                Neg Hx                          Review of Systems   Constitution: Negative for weakness and malaise/fatigue.   HENT: Negative.    Eyes: Negative.    Cardiovascular: Positive for dyspnea on exertion. Negative for chest pain, leg swelling, near-syncope, orthopnea, palpitations and syncope.   Respiratory: Negative.    Endocrine: Negative.    Hematologic/Lymphatic: Negative.    Skin: Negative.    Musculoskeletal: Negative.    Gastrointestinal: Positive for bloating and diarrhea.        Ostomy bag   Genitourinary: Negative.    Neurological: Negative.    Psychiatric/Behavioral: Negative.    Allergic/Immunologic: Negative.         Objective:    Physical Exam   Constitutional: He is oriented to person, place, and time. He appears well-developed and well-nourished. No distress.   HENT:   Head: Normocephalic and atraumatic.   Mouth/Throat: No oropharyngeal exudate.   Eyes: Conjunctivae and EOM are normal. Pupils are equal, round, and reactive to light. Right eye exhibits no discharge. Left eye exhibits no discharge.   Neck: Normal range of motion. Neck supple. No JVD present. No thyromegaly present.   Cardiovascular: Normal rate, regular rhythm and normal heart sounds.    No murmur heard.  Pulmonary/Chest: Effort normal and breath sounds normal. No respiratory distress. He has no wheezes.   L upper chest wound, healing well with underlying generator   Abdominal: Soft. Bowel sounds are normal. He exhibits no distension. There is no tenderness.   Ostomy bag LLQ   Musculoskeletal: Normal range of motion. He exhibits no edema.   Neurological: He is alert and oriented to person, place, and time. No cranial nerve deficit.   Skin: Skin is warm and dry. No rash noted. He is not diaphoretic. No erythema.   Psychiatric: He has  a normal mood and affect. His behavior is normal. Judgment and thought content normal.   Vitals reviewed.        Assessment:       1. Cardiomyopathy, unspecified type    2. Biventricular ICD (implantable cardioverter-defibrillator) in place    3. LBBB (left bundle branch block)    4. Ischemic cardiomyopathy    5. Hypertension associated with diabetes         Plan:       62 yoM ICM, LBBB s/p CRT-D here for routine visit. Normal CRT-D function with no sustained arrhythmias. I discussed routine device follow up including quarterly to bi-annual device checks for device function as well as yearly follow up in the EP clinic. The patient  was advised to call with any concerns regarding their device. Device clinic follow up as scheduled. RTC 1y

## 2017-07-17 ENCOUNTER — OFFICE VISIT (OUTPATIENT)
Dept: PULMONOLOGY | Facility: CLINIC | Age: 62
End: 2017-07-17
Payer: COMMERCIAL

## 2017-07-17 VITALS
BODY MASS INDEX: 31.94 KG/M2 | DIASTOLIC BLOOD PRESSURE: 70 MMHG | HEIGHT: 68 IN | WEIGHT: 210.75 LBS | RESPIRATION RATE: 18 BRPM | SYSTOLIC BLOOD PRESSURE: 118 MMHG | OXYGEN SATURATION: 98 % | HEART RATE: 73 BPM

## 2017-07-17 DIAGNOSIS — I50.42 CHRONIC COMBINED SYSTOLIC AND DIASTOLIC HEART FAILURE: Primary | Chronic | ICD-10-CM

## 2017-07-17 DIAGNOSIS — R06.3 CENTRAL SLEEP APNEA DUE TO CHEYNE-STOKES RESPIRATION: ICD-10-CM

## 2017-07-17 DIAGNOSIS — G47.33 OSA ON CPAP: Chronic | ICD-10-CM

## 2017-07-17 DIAGNOSIS — I50.43 ACUTE ON CHRONIC COMBINED SYSTOLIC AND DIASTOLIC CONGESTIVE HEART FAILURE: ICD-10-CM

## 2017-07-17 PROCEDURE — 99214 OFFICE O/P EST MOD 30 MIN: CPT | Mod: S$GLB,,, | Performed by: INTERNAL MEDICINE

## 2017-07-17 PROCEDURE — 99999 PR PBB SHADOW E&M-EST. PATIENT-LVL III: CPT | Mod: PBBFAC,,, | Performed by: INTERNAL MEDICINE
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
## 2017-07-22 RX ORDER — POTASSIUM CHLORIDE 750 MG/1
TABLET, EXTENDED RELEASE ORAL
Qty: 90 TABLET | Refills: 2 | Status: SHIPPED | OUTPATIENT
Start: 2017-07-22 | End: 2018-05-02 | Stop reason: SDUPTHER

## 2017-07-22 RX ORDER — ALLOPURINOL 100 MG/1
TABLET ORAL
Qty: 90 TABLET | Refills: 0 | Status: SHIPPED | OUTPATIENT
Start: 2017-07-22 | End: 2017-10-19 | Stop reason: SDUPTHER

## 2017-07-24 ENCOUNTER — TELEPHONE (OUTPATIENT)
Dept: INTERNAL MEDICINE | Facility: CLINIC | Age: 62
End: 2017-07-24

## 2017-07-24 NOTE — TELEPHONE ENCOUNTER
----- Message from Ta Patterson sent at 7/24/2017  9:50 AM CDT -----  Contact: [phone] or [phone]  Would like to consult with nurse about acid reflux refill and Gout medication refill. Please call back at [phone]. x. yulia     [facility]/pharmacy #[id number] - [place] - [address] AT AT [facility]  [address]  [place] [address]  Phone: [phone] Fax: [phone]

## 2017-07-24 NOTE — TELEPHONE ENCOUNTER
Pt wife states that pharmacy didn't receive medication. Pt wife was advised that  Medication was at pharmacy and was ready to be picked up

## 2017-07-27 RX ORDER — PANTOPRAZOLE SODIUM 40 MG/1
TABLET, DELAYED RELEASE ORAL
Qty: 30 TABLET | Refills: 4 | Status: SHIPPED | OUTPATIENT
Start: 2017-07-27 | End: 2018-01-12 | Stop reason: SDUPTHER

## 2017-08-02 RX ORDER — SIMVASTATIN 10 MG/1
10 TABLET, FILM COATED ORAL NIGHTLY
Qty: 30 TABLET | Refills: 11 | Status: SHIPPED | OUTPATIENT
Start: 2017-08-02 | End: 2018-08-08 | Stop reason: SDUPTHER

## 2017-08-02 RX ORDER — CARVEDILOL 25 MG/1
12.5 TABLET ORAL 2 TIMES DAILY WITH MEALS
Qty: 30 TABLET | Refills: 11 | Status: SHIPPED | OUTPATIENT
Start: 2017-08-02 | End: 2018-08-08 | Stop reason: SDUPTHER

## 2017-08-03 DIAGNOSIS — I50.9 ACUTE ON CHRONIC CONGESTIVE HEART FAILURE, UNSPECIFIED CONGESTIVE HEART FAILURE TYPE: ICD-10-CM

## 2017-08-03 RX ORDER — BUMETANIDE 2 MG/1
2 TABLET ORAL DAILY
Qty: 90 TABLET | Refills: 3 | Status: SHIPPED | OUTPATIENT
Start: 2017-08-03 | End: 2018-01-19 | Stop reason: SDUPTHER

## 2017-08-04 ENCOUNTER — CLINICAL SUPPORT (OUTPATIENT)
Dept: CARDIOLOGY | Facility: CLINIC | Age: 62
End: 2017-08-04
Payer: COMMERCIAL

## 2017-08-04 ENCOUNTER — HOSPITAL ENCOUNTER (OUTPATIENT)
Dept: RADIOLOGY | Facility: HOSPITAL | Age: 62
Discharge: HOME | End: 2017-08-04
Attending: PHYSICIAN ASSISTANT
Payer: COMMERCIAL

## 2017-08-04 ENCOUNTER — OFFICE VISIT (OUTPATIENT)
Dept: INTERNAL MEDICINE | Facility: CLINIC | Age: 62
End: 2017-08-04
Payer: COMMERCIAL

## 2017-08-04 VITALS
OXYGEN SATURATION: 96 % | BODY MASS INDEX: 33.41 KG/M2 | SYSTOLIC BLOOD PRESSURE: 118 MMHG | DIASTOLIC BLOOD PRESSURE: 84 MMHG | HEART RATE: 76 BPM | HEIGHT: 68 IN | TEMPERATURE: 97 F | WEIGHT: 220.44 LBS

## 2017-08-04 DIAGNOSIS — R06.02 SHORTNESS OF BREATH: Primary | ICD-10-CM

## 2017-08-04 DIAGNOSIS — R06.02 SHORTNESS OF BREATH: ICD-10-CM

## 2017-08-04 DIAGNOSIS — J40 BRONCHITIS: ICD-10-CM

## 2017-08-04 DIAGNOSIS — J01.90 ACUTE SINUSITIS, RECURRENCE NOT SPECIFIED, UNSPECIFIED LOCATION: ICD-10-CM

## 2017-08-04 PROCEDURE — 99213 OFFICE O/P EST LOW 20 MIN: CPT | Mod: S$GLB,,, | Performed by: PHYSICIAN ASSISTANT

## 2017-08-04 PROCEDURE — 93000 ELECTROCARDIOGRAM COMPLETE: CPT | Mod: S$GLB,,, | Performed by: NUCLEAR MEDICINE

## 2017-08-04 PROCEDURE — 3008F BODY MASS INDEX DOCD: CPT | Mod: S$GLB,,, | Performed by: PHYSICIAN ASSISTANT

## 2017-08-04 PROCEDURE — 99999 PR PBB SHADOW E&M-EST. PATIENT-LVL IV: CPT | Mod: PBBFAC,,, | Performed by: PHYSICIAN ASSISTANT

## 2017-08-04 PROCEDURE — 71020 XR CHEST PA AND LATERAL: CPT | Mod: TC,PO

## 2017-08-04 PROCEDURE — 71020 XR CHEST PA AND LATERAL: CPT | Mod: 26,,, | Performed by: RADIOLOGY

## 2017-08-04 RX ORDER — DOXYCYCLINE 100 MG/1
100 CAPSULE ORAL 2 TIMES DAILY
Qty: 20 CAPSULE | Refills: 0 | Status: SHIPPED | OUTPATIENT
Start: 2017-08-04 | End: 2017-08-14

## 2017-08-04 RX ORDER — PREDNISONE 20 MG/1
TABLET ORAL
Qty: 10 TABLET | Refills: 0 | Status: SHIPPED | OUTPATIENT
Start: 2017-08-04 | End: 2017-11-02

## 2017-08-04 NOTE — PROGRESS NOTES
Subjective:      Patient ID: Yan Choudhury is a 62 y.o. male.    Chief Complaint: Shortness of Breath    Shortness of Breath   This is a new problem. The current episode started in the past 7 days. The problem occurs intermittently. The problem has been rapidly worsening. Associated symptoms include wheezing. Pertinent negatives include no abdominal pain, chest pain, claudication, coryza, ear pain, fever, headaches, hemoptysis, leg pain, leg swelling, neck pain, orthopnea, PND, rash, rhinorrhea, sore throat, sputum production, swollen glands, syncope or vomiting. Nothing aggravates the symptoms. The patient has no known risk factors for DVT/PE. He has tried nothing for the symptoms. His past medical history is significant for CAD and chronic lung disease. There is no history of allergies, aspirin allergies, asthma, bronchiolitis, COPD, DVT, a heart failure, PE, pneumonia or a recent surgery.     Patient Active Problem List   Diagnosis    Hypertension associated with diabetes    Stage 3 chronic kidney disease    ED (erectile dysfunction)    Ischemic cardiomyopathy    Pulmonary HTN    Hyperlipidemia associated with type 2 diabetes mellitus    Loss of eye - Right Eye    LBBB (left bundle branch block)    Abnormal stress test    Cysts of eyelids    CAD, multiple vessel    Primary open-angle glaucoma, moderate stage    Chronic combined systolic and diastolic heart failure    Central sleep apnea due to Cheyne-Long respiration    CHF NYHA class II    Biventricular ICD (implantable cardioverter-defibrillator) in place    Anemia    Colostomy in place    Type 2 diabetes mellitus with stage 3 chronic kidney disease, with long-term current use of insulin    JAHAIRA on CPAP    Gastroesophageal reflux disease    Acute on chronic combined systolic and diastolic congestive heart failure    Chronic gout without tophus    Severe obesity (BMI 35.0-39.9) with comorbidity     Review of Systems   Constitutional:  "Positive for activity change, appetite change, chills and fatigue. Negative for diaphoresis, fever and unexpected weight change.   HENT: Positive for congestion, postnasal drip and sinus pressure. Negative for dental problem, drooling, ear discharge, ear pain, facial swelling, hearing loss, mouth sores, nosebleeds, rhinorrhea, sneezing, sore throat and trouble swallowing.    Eyes: Negative for pain, discharge, redness, itching and visual disturbance.   Respiratory: Positive for cough, shortness of breath and wheezing. Negative for hemoptysis, sputum production and chest tightness.    Cardiovascular: Negative for chest pain, palpitations, orthopnea, claudication, leg swelling, syncope and PND.   Gastrointestinal: Negative for abdominal pain, constipation, diarrhea, nausea and vomiting.   Endocrine: Negative.    Genitourinary: Negative.  Negative for difficulty urinating.   Musculoskeletal: Positive for myalgias. Negative for neck pain and neck stiffness.   Skin: Negative for rash.   Allergic/Immunologic: Negative for environmental allergies, food allergies and immunocompromised state.   Neurological: Negative for dizziness, weakness and headaches.     Objective:   /84   Pulse 76   Temp 96.6 °F (35.9 °C) (Tympanic)   Ht 5' 8" (1.727 m)   Wt 100 kg (220 lb 7.4 oz)   SpO2 96%   BMI 33.52 kg/m²     Physical Exam   Constitutional: He is oriented to person, place, and time. He appears well-developed and well-nourished. No distress.   HENT:   Head: Normocephalic and atraumatic.   Right Ear: Hearing, tympanic membrane, external ear and ear canal normal. No tenderness.   Left Ear: Hearing, tympanic membrane, external ear and ear canal normal. No tenderness.   Nose: Mucosal edema and rhinorrhea present. No sinus tenderness. Right sinus exhibits maxillary sinus tenderness and frontal sinus tenderness. Left sinus exhibits maxillary sinus tenderness and frontal sinus tenderness.   Mouth/Throat: Uvula is midline and " mucous membranes are normal. No oral lesions. Normal dentition. No dental abscesses, uvula swelling or dental caries. Oropharyngeal exudate and posterior oropharyngeal erythema present. No posterior oropharyngeal edema or tonsillar abscesses. No tonsillar exudate.   Eyes: Conjunctivae and EOM are normal. Pupils are equal, round, and reactive to light. Right eye exhibits no discharge. Left eye exhibits no discharge.   Neck: Normal range of motion. Neck supple.   Cardiovascular: Normal rate, regular rhythm and normal heart sounds.  Exam reveals no gallop and no friction rub.    No murmur heard.  Pulmonary/Chest: Accessory muscle usage present. No respiratory distress. He has wheezes. He has no rales.   Musculoskeletal: Normal range of motion. He exhibits no edema, tenderness or deformity.   Lymphadenopathy:     He has no cervical adenopathy.   Neurological: He is alert and oriented to person, place, and time.   Skin: Skin is warm. No rash noted. He is not diaphoretic. No erythema. No pallor.   Psychiatric: He has a normal mood and affect. His behavior is normal. Judgment and thought content normal.   Nursing note and vitals reviewed.    Lab Visit on 08/04/2017   Component Date Value Ref Range Status    WBC 08/04/2017 6.20  3.90 - 12.70 K/uL Final    RBC 08/04/2017 4.58* 4.60 - 6.20 M/uL Final    Hemoglobin 08/04/2017 13.3* 14.0 - 18.0 g/dL Final    Hematocrit 08/04/2017 40.8  40.0 - 54.0 % Final    MCV 08/04/2017 89  82 - 98 fL Final    MCH 08/04/2017 29.0  27.0 - 31.0 pg Final    MCHC 08/04/2017 32.6  32.0 - 36.0 g/dL Final    RDW 08/04/2017 17.0* 11.5 - 14.5 % Final    Platelets 08/04/2017 124* 150 - 350 K/uL Final    MPV 08/04/2017 11.7  9.2 - 12.9 fL Final    Gran # 08/04/2017 3.1  1.8 - 7.7 K/uL Final    Lymph # 08/04/2017 2.3  1.0 - 4.8 K/uL Final    Mono # 08/04/2017 0.7  0.3 - 1.0 K/uL Final    Eos # 08/04/2017 0.2  0.0 - 0.5 K/uL Final    Baso # 08/04/2017 0.03  0.00 - 0.20 K/uL Final    Gran%  08/04/2017 49.5  38.0 - 73.0 % Final    Lymph% 08/04/2017 36.3  18.0 - 48.0 % Final    Mono% 08/04/2017 10.6  4.0 - 15.0 % Final    Eosinophil% 08/04/2017 3.1  0.0 - 8.0 % Final    Basophil% 08/04/2017 0.5  0.0 - 1.9 % Final    Platelet Estimate 08/04/2017 Decreased*  Final    Aniso 08/04/2017 Slight   Final    Poik 08/04/2017 Slight   Final    Poly 08/04/2017 Occasional   Final    Ovalocytes 08/04/2017 Occasional   Final    Smudge Cells 08/04/2017 Present   Final    Comment: Smudge cells present;Substantial numbers may affect the   accuracy of the differential.      Large/Giant Platelets 08/04/2017 Present   Final    Differential Method 08/04/2017 Automated   Final     CXR results:   Findings: There is stable cardiomegaly with cardiac pacer in place on the left.  Interval decrease in previously demonstrated mild perihilar and basilar congestion.  No acute consolidation or effusion.  Multilevel thoracic spondylosis.   Impression      As above.     EKG:   Atrial-sensed ventricular-paced rhythm  Biventricular pacemaker detected  Abnormal ECG  No previous ECGs available    Assessment:     1. Shortness of breath    2. Acute sinusitis, recurrence not specified, unspecified location    3. Bronchitis      Plan:   Shortness of breath  -     X-Ray Chest PA And Lateral; Future; Expected date: 08/04/2017  -     EKG 12-lead; Future  -     CBC auto differential; Future; Expected date: 08/04/2017  -     Brain natriuretic peptide; Future; Expected date: 08/04/2017    Acute sinusitis, recurrence not specified, unspecified location  -     doxycycline (MONODOX) 100 MG capsule; Take 1 capsule (100 mg total) by mouth 2 (two) times daily. Do not take with milk or yogurt  Dispense: 20 capsule; Refill: 0    Bronchitis  -     predniSONE (DELTASONE) 20 MG tablet; Take one tablet twice daily for 3 days, then take one tablet daily for 2 days, then take 1/2 tablet daily for 2 days.  Dispense: 10 tablet; Refill: 0    -if shortness  of breath worsens, go to the ER.     Return if symptoms worsen or fail to improve.

## 2017-08-08 DIAGNOSIS — I50.43 ACUTE ON CHRONIC COMBINED SYSTOLIC AND DIASTOLIC CONGESTIVE HEART FAILURE: ICD-10-CM

## 2017-08-08 DIAGNOSIS — I50.43 ACUTE ON CHRONIC COMBINED SYSTOLIC AND DIASTOLIC CONGESTIVE HEART FAILURE: Primary | ICD-10-CM

## 2017-08-08 DIAGNOSIS — R79.89 ELEVATED BRAIN NATRIURETIC PEPTIDE (BNP) LEVEL: Primary | ICD-10-CM

## 2017-08-09 DIAGNOSIS — I25.5 ISCHEMIC CARDIOMYOPATHY: ICD-10-CM

## 2017-08-09 DIAGNOSIS — I50.42 CHRONIC COMBINED SYSTOLIC AND DIASTOLIC HEART FAILURE: ICD-10-CM

## 2017-08-09 DIAGNOSIS — Z95.810 ICD (IMPLANTABLE CARDIOVERTER-DEFIBRILLATOR) IN PLACE: Primary | ICD-10-CM

## 2017-08-11 ENCOUNTER — OFFICE VISIT (OUTPATIENT)
Dept: INTERNAL MEDICINE | Facility: CLINIC | Age: 62
End: 2017-08-11
Payer: COMMERCIAL

## 2017-08-11 ENCOUNTER — LAB VISIT (OUTPATIENT)
Dept: LAB | Facility: HOSPITAL | Age: 62
End: 2017-08-11
Attending: PHYSICIAN ASSISTANT
Payer: COMMERCIAL

## 2017-08-11 VITALS
HEART RATE: 61 BPM | DIASTOLIC BLOOD PRESSURE: 84 MMHG | SYSTOLIC BLOOD PRESSURE: 138 MMHG | TEMPERATURE: 97 F | WEIGHT: 223.13 LBS | BODY MASS INDEX: 33.82 KG/M2 | OXYGEN SATURATION: 98 % | HEIGHT: 68 IN

## 2017-08-11 DIAGNOSIS — I50.43 ACUTE ON CHRONIC COMBINED SYSTOLIC AND DIASTOLIC CONGESTIVE HEART FAILURE: ICD-10-CM

## 2017-08-11 DIAGNOSIS — I50.43 ACUTE ON CHRONIC COMBINED SYSTOLIC AND DIASTOLIC CONGESTIVE HEART FAILURE: Primary | ICD-10-CM

## 2017-08-11 LAB — BNP SERPL-MCNC: 982 PG/ML

## 2017-08-11 PROCEDURE — 99213 OFFICE O/P EST LOW 20 MIN: CPT | Mod: S$GLB,,, | Performed by: PHYSICIAN ASSISTANT

## 2017-08-11 PROCEDURE — 83880 ASSAY OF NATRIURETIC PEPTIDE: CPT

## 2017-08-11 PROCEDURE — 36415 COLL VENOUS BLD VENIPUNCTURE: CPT | Mod: PO

## 2017-08-11 PROCEDURE — 3079F DIAST BP 80-89 MM HG: CPT | Mod: S$GLB,,, | Performed by: PHYSICIAN ASSISTANT

## 2017-08-11 PROCEDURE — 99999 PR PBB SHADOW E&M-EST. PATIENT-LVL IV: CPT | Mod: PBBFAC,,, | Performed by: PHYSICIAN ASSISTANT

## 2017-08-11 PROCEDURE — 3008F BODY MASS INDEX DOCD: CPT | Mod: S$GLB,,, | Performed by: PHYSICIAN ASSISTANT

## 2017-08-11 PROCEDURE — 3075F SYST BP GE 130 - 139MM HG: CPT | Mod: S$GLB,,, | Performed by: PHYSICIAN ASSISTANT

## 2017-08-11 NOTE — PROGRESS NOTES
"  Subjective:      Patient ID: Yan Choudhury is a 62 y.o. male.    Chief Complaint: Discuss test results    Increased bumex to BID. Has been on doxycycline.      Shortness of Breath   This is a new problem. The problem has been gradually improving. Pertinent negatives include no abdominal pain, chest pain, claudication, coryza, ear pain, fever, headaches, hemoptysis, leg pain, leg swelling, neck pain, orthopnea, PND, rash, rhinorrhea, sore throat, sputum production, swollen glands, syncope, vomiting or wheezing.       Review of Systems   Constitutional: Negative for activity change, appetite change, chills, diaphoresis, fatigue, fever and unexpected weight change.   HENT: Negative for ear pain, rhinorrhea and sore throat.    Respiratory: Positive for shortness of breath (improved). Negative for hemoptysis, sputum production and wheezing.    Cardiovascular: Negative for chest pain, palpitations, orthopnea, claudication, leg swelling, syncope and PND.   Gastrointestinal: Negative for abdominal pain and vomiting.   Musculoskeletal: Negative for neck pain.   Skin: Negative for rash.   Neurological: Negative for headaches.     Objective:   /84 (BP Location: Right arm, Patient Position: Sitting)   Pulse 61   Temp 96.7 °F (35.9 °C) (Tympanic)   Ht 5' 8" (1.727 m)   Wt 101.2 kg (223 lb 1.7 oz)   SpO2 98%   BMI 33.92 kg/m²     Physical Exam   Constitutional: He is oriented to person, place, and time. He appears well-developed and well-nourished.   HENT:   Head: Normocephalic and atraumatic.   Right Ear: External ear normal.   Left Ear: External ear normal.   Nose: Nose normal.   Mouth/Throat: Oropharynx is clear and moist.   Eyes: Conjunctivae and EOM are normal. Pupils are equal, round, and reactive to light.   Neck: Normal range of motion. Neck supple.   Cardiovascular: Normal rate, regular rhythm and normal heart sounds.  Exam reveals no gallop and no friction rub.    No murmur heard.  Pulmonary/Chest: Effort " normal and breath sounds normal. No respiratory distress. He has no wheezes. He has no rales. He exhibits no tenderness.   Abdominal: Soft. He exhibits no distension. There is no tenderness.   Musculoskeletal: Normal range of motion.        Right ankle: He exhibits swelling.        Left ankle: He exhibits swelling.        Feet:    Lymphadenopathy:     He has no cervical adenopathy.   Neurological: He is alert and oriented to person, place, and time.   Skin: Skin is warm and dry.   Psychiatric: He has a normal mood and affect. His behavior is normal. Judgment and thought content normal.   Vitals reviewed.      Assessment:     1. Acute on chronic combined systolic and diastolic congestive heart failure      Plan:   Acute on chronic combined systolic and diastolic congestive heart failure    -scheduled to follow up with cardiology on the 17th. Pts symptoms much improved. Still awaiting results of repeat BNP. Lung sounds are good, sat is good today.   -Still swelling in ankles, advised patient to continue bumex twice daily for the next two days and then go back to normal dose on Monday.     Return if symptoms worsen or fail to improve.

## 2017-08-11 NOTE — PATIENT INSTRUCTIONS
Continue bumetanide (bumex) 2mg tablets twice daily for the next two days, and then go back to normal dose (once daily) on Monday.

## 2017-08-17 ENCOUNTER — OFFICE VISIT (OUTPATIENT)
Dept: CARDIOLOGY | Facility: CLINIC | Age: 62
End: 2017-08-17
Payer: COMMERCIAL

## 2017-08-17 ENCOUNTER — CLINICAL SUPPORT (OUTPATIENT)
Dept: CARDIOLOGY | Facility: CLINIC | Age: 62
End: 2017-08-17
Payer: COMMERCIAL

## 2017-08-17 VITALS
SYSTOLIC BLOOD PRESSURE: 136 MMHG | HEART RATE: 82 BPM | RESPIRATION RATE: 24 BRPM | DIASTOLIC BLOOD PRESSURE: 70 MMHG | HEIGHT: 68 IN | WEIGHT: 223.13 LBS | BODY MASS INDEX: 33.82 KG/M2

## 2017-08-17 DIAGNOSIS — N18.30 STAGE 3 CHRONIC KIDNEY DISEASE: ICD-10-CM

## 2017-08-17 DIAGNOSIS — I27.20 PULMONARY HTN: ICD-10-CM

## 2017-08-17 DIAGNOSIS — Z95.810 ICD (IMPLANTABLE CARDIOVERTER-DEFIBRILLATOR) IN PLACE: ICD-10-CM

## 2017-08-17 DIAGNOSIS — E78.5 HYPERLIPIDEMIA ASSOCIATED WITH TYPE 2 DIABETES MELLITUS: ICD-10-CM

## 2017-08-17 DIAGNOSIS — E11.59 HYPERTENSION ASSOCIATED WITH DIABETES: ICD-10-CM

## 2017-08-17 DIAGNOSIS — R06.3 CENTRAL SLEEP APNEA DUE TO CHEYNE-STOKES RESPIRATION: ICD-10-CM

## 2017-08-17 DIAGNOSIS — I15.2 HYPERTENSION ASSOCIATED WITH DIABETES: ICD-10-CM

## 2017-08-17 DIAGNOSIS — G47.33 OSA ON CPAP: Chronic | ICD-10-CM

## 2017-08-17 DIAGNOSIS — I25.10 CAD, MULTIPLE VESSEL: Chronic | ICD-10-CM

## 2017-08-17 DIAGNOSIS — I44.7 LBBB (LEFT BUNDLE BRANCH BLOCK): ICD-10-CM

## 2017-08-17 DIAGNOSIS — Z95.810 BIVENTRICULAR ICD (IMPLANTABLE CARDIOVERTER-DEFIBRILLATOR) IN PLACE: Chronic | ICD-10-CM

## 2017-08-17 DIAGNOSIS — I50.42 CHRONIC COMBINED SYSTOLIC AND DIASTOLIC HEART FAILURE: Primary | Chronic | ICD-10-CM

## 2017-08-17 DIAGNOSIS — I50.42 CHRONIC COMBINED SYSTOLIC AND DIASTOLIC HEART FAILURE: ICD-10-CM

## 2017-08-17 DIAGNOSIS — E11.69 HYPERLIPIDEMIA ASSOCIATED WITH TYPE 2 DIABETES MELLITUS: ICD-10-CM

## 2017-08-17 DIAGNOSIS — I25.5 ISCHEMIC CARDIOMYOPATHY: ICD-10-CM

## 2017-08-17 PROCEDURE — 3044F HG A1C LEVEL LT 7.0%: CPT | Mod: S$GLB,,, | Performed by: NURSE PRACTITIONER

## 2017-08-17 PROCEDURE — 99214 OFFICE O/P EST MOD 30 MIN: CPT | Mod: S$GLB,,, | Performed by: NURSE PRACTITIONER

## 2017-08-17 PROCEDURE — 3066F NEPHROPATHY DOC TX: CPT | Mod: S$GLB,,, | Performed by: NURSE PRACTITIONER

## 2017-08-17 PROCEDURE — 93284 PRGRMG EVAL IMPLANTABLE DFB: CPT | Mod: 26,,, | Performed by: INTERNAL MEDICINE

## 2017-08-17 PROCEDURE — 3075F SYST BP GE 130 - 139MM HG: CPT | Mod: S$GLB,,, | Performed by: NURSE PRACTITIONER

## 2017-08-17 PROCEDURE — 3008F BODY MASS INDEX DOCD: CPT | Mod: S$GLB,,, | Performed by: NURSE PRACTITIONER

## 2017-08-17 PROCEDURE — 3078F DIAST BP <80 MM HG: CPT | Mod: S$GLB,,, | Performed by: NURSE PRACTITIONER

## 2017-08-17 PROCEDURE — 99999 PR PBB SHADOW E&M-EST. PATIENT-LVL III: CPT | Mod: PBBFAC,,, | Performed by: NURSE PRACTITIONER

## 2017-08-17 NOTE — PROGRESS NOTES
"Subjective:   Patient ID:  Yan Choudhury is a 62 y.o. male who presents for follow up of Congestive Heart Failure      HPI   Patient presents to clinic for follow up and management of ischemic cardiomyopathy. He has combined systolic and diastolic HF with EF of 30%. . Patient states that for the past 2 weeks he has been sob. He did not call Cardiology clinic, but went to Urgent Care. He was given instructions to increase his Bumex to 2mg BID by PCP for the next 3 days last week. He has done this change and had some improvement in symptoms, but sob returned. He has now resumed once daily dosing.  His wife thinks he has been drinking lots of fluids. BNP checked at urgent care approximately 2 weeks ago and was 1800. This is when he was given instructions to increase Bumex. Repeat . BNP 6 months ago was 600 and he felt fine. Patient complains of bloating. Has no orthopnea . Has no ICD firing     Past Medical History:   Diagnosis Date    Arthritis     CAD (coronary artery disease)     Cataract     CHF (congestive heart failure)     Dr Calvillo    Chronic combined systolic and diastolic congestive heart failure 3/24/2015    CKD (chronic kidney disease), stage III     Diabetes mellitus type II      AM    Diabetic retinopathy     anti Veg F injections for macular edema    Diverticulitis of colon     ED (erectile dysfunction)     Glaucoma     HTN (hypertension)     Hyperlipidemia     Ischemic cardiomyopathy     Obesity     Pulmonary HTN        Past Surgical History:   Procedure Laterality Date    CARDIAC CATHETERIZATION  2009    CHOLECYSTECTOMY      COLONOSCOPY      EYE SURGERY      KNEE SURGERY         Social History   Substance Use Topics    Smoking status: Never Smoker    Smokeless tobacco: Never Used    Alcohol use 0.0 oz/week      Comment: " once in awhile...special occassions"       Family History   Problem Relation Age of Onset    Cancer Mother     Heart disease Father     Stroke " "Father     No Known Problems Sister     No Known Problems Brother     No Known Problems Maternal Aunt     No Known Problems Maternal Uncle     No Known Problems Paternal Aunt     No Known Problems Paternal Uncle     No Known Problems Maternal Grandmother     No Known Problems Maternal Grandfather     No Known Problems Paternal Grandmother     No Known Problems Paternal Grandfather     Amblyopia Neg Hx     Blindness Neg Hx     Cataracts Neg Hx     Diabetes Neg Hx     Glaucoma Neg Hx     Hypertension Neg Hx     Macular degeneration Neg Hx     Retinal detachment Neg Hx     Strabismus Neg Hx     Thyroid disease Neg Hx        Current Outpatient Prescriptions   Medication Sig    allopurinol (ZYLOPRIM) 100 MG tablet TAKE 1 TABLET (100 MG TOTAL) BY MOUTH ONCE DAILY.    aspirin (ECOTRIN) 81 MG EC tablet Take 81 mg by mouth once daily.    BD INSULIN SYRINGE ULTRA-FINE 0.5 mL 31 gauge x 5/16 Syrg USE AS DIRECTED TO INJECT INSULIN TWO TIMES DAILY    bumetanide (BUMEX) 2 MG tablet Take 1 tablet (2 mg total) by mouth once daily.    carvedilol (COREG) 25 MG tablet Take 0.5 tablets (12.5 mg total) by mouth 2 (two) times daily with meals. Takes one-half tablet by mouth twice a day    guaifenesin (MUCINEX) 600 mg 12 hr tablet Take 1,200 mg by mouth 2 (two) times daily as needed.     insulin glargine (LANTUS) 100 unit/mL injection 50 units bid    insulin needles, disposable, (BD ULTRA-FINE JOSLYN PEN NEEDLES) 32 x 5/32 " Ndle 1 each by Misc.(Non-Drug; Combo Route) route 4 (four) times daily.    IRON/VITAMIN B COMPLEX (GERITOL ORAL) Take by mouth.    lisinopril (PRINIVIL,ZESTRIL) 5 MG tablet Take 1 tablet (5 mg total) by mouth once daily.    pantoprazole (PROTONIX) 40 MG tablet TAKE 1 TABLET BY MOUTH EVERY DAY FOR ACID REFLUX    potassium chloride SA (K-DUR,KLOR-CON) 10 MEQ tablet TAKE 1 TABLET BY MOUTH EVERY DAY    predniSONE (DELTASONE) 20 MG tablet Take one tablet twice daily for 3 days, then take one " "tablet daily for 2 days, then take 1/2 tablet daily for 2 days.    simvastatin (ZOCOR) 10 MG tablet Take 1 tablet (10 mg total) by mouth every evening.     No current facility-administered medications for this visit.      Current Outpatient Prescriptions on File Prior to Visit   Medication Sig    allopurinol (ZYLOPRIM) 100 MG tablet TAKE 1 TABLET (100 MG TOTAL) BY MOUTH ONCE DAILY.    aspirin (ECOTRIN) 81 MG EC tablet Take 81 mg by mouth once daily.    BD INSULIN SYRINGE ULTRA-FINE 0.5 mL 31 gauge x 5/16 Syrg USE AS DIRECTED TO INJECT INSULIN TWO TIMES DAILY    bumetanide (BUMEX) 2 MG tablet Take 1 tablet (2 mg total) by mouth once daily.    carvedilol (COREG) 25 MG tablet Take 0.5 tablets (12.5 mg total) by mouth 2 (two) times daily with meals. Takes one-half tablet by mouth twice a day    guaifenesin (MUCINEX) 600 mg 12 hr tablet Take 1,200 mg by mouth 2 (two) times daily as needed.     insulin glargine (LANTUS) 100 unit/mL injection 50 units bid    insulin needles, disposable, (BD ULTRA-FINE JOSLYN PEN NEEDLES) 32 x 5/32 " Ndle 1 each by Misc.(Non-Drug; Combo Route) route 4 (four) times daily.    IRON/VITAMIN B COMPLEX (GERITOL ORAL) Take by mouth.    lisinopril (PRINIVIL,ZESTRIL) 5 MG tablet Take 1 tablet (5 mg total) by mouth once daily.    pantoprazole (PROTONIX) 40 MG tablet TAKE 1 TABLET BY MOUTH EVERY DAY FOR ACID REFLUX    potassium chloride SA (K-DUR,KLOR-CON) 10 MEQ tablet TAKE 1 TABLET BY MOUTH EVERY DAY    predniSONE (DELTASONE) 20 MG tablet Take one tablet twice daily for 3 days, then take one tablet daily for 2 days, then take 1/2 tablet daily for 2 days.    simvastatin (ZOCOR) 10 MG tablet Take 1 tablet (10 mg total) by mouth every evening.     No current facility-administered medications on file prior to visit.        Review of Systems   Constitution: Negative for decreased appetite, weakness, malaise/fatigue, weight gain and weight loss.   HENT: Negative for nosebleeds.  "   Cardiovascular: Positive for leg swelling. Negative for chest pain, claudication, dyspnea on exertion, irregular heartbeat, near-syncope, orthopnea, palpitations, paroxysmal nocturnal dyspnea and syncope.   Respiratory: Positive for shortness of breath. Negative for cough, sleep disturbances due to breathing, snoring and wheezing.         Variable comliance with cpap use      Hematologic/Lymphatic: Negative for bleeding problem. Does not bruise/bleed easily.   Skin: Negative for rash.   Musculoskeletal: Negative for arthritis, back pain, falls, joint pain, joint swelling, muscle cramps, muscle weakness and myalgias.   Gastrointestinal: Positive for bloating. Negative for abdominal pain, constipation, diarrhea, heartburn, nausea and vomiting.   Genitourinary: Negative for dysuria, hematuria and nocturia.   Neurological: Negative for excessive daytime sleepiness, dizziness, light-headedness, loss of balance, numbness, paresthesias and vertigo.       Objective:   Physical Exam   Constitutional: He is oriented to person, place, and time. He appears well-developed and well-nourished.   Neck: Neck supple. No JVD present.   Cardiovascular: Normal rate, regular rhythm, normal heart sounds and normal pulses.  Exam reveals no friction rub.    No murmur heard.  Pulmonary/Chest: Effort normal. No respiratory distress. He has decreased breath sounds. He has no wheezes. He has rales.   Abdominal: Soft. Bowel sounds are normal. He exhibits distension.   Colostomy      Musculoskeletal: He exhibits edema. He exhibits no tenderness.   Neurological: He is alert and oriented to person, place, and time.   Skin: Skin is warm and dry. No rash noted.   Psychiatric: He has a normal mood and affect. His behavior is normal.   Nursing note and vitals reviewed.    Vitals:    08/17/17 1023   BP: 136/70   BP Location: Right arm   Patient Position: Sitting   BP Method: Medium (Manual)   Pulse: 82   Resp: (!) 24   Weight: 101.2 kg (223 lb 1.7 oz)  "  Height: 5' 8" (1.727 m)     Lab Results   Component Value Date    CHOL 112 (L) 04/20/2017    CHOL 107 (L) 11/17/2016    CHOL 105 (L) 05/17/2016     Lab Results   Component Value Date    HDL 39 (L) 04/20/2017    HDL 31 (L) 11/17/2016    HDL 20 (L) 05/17/2016     Lab Results   Component Value Date    LDLCALC 58.6 (L) 04/20/2017    LDLCALC 58.2 (L) 11/17/2016    LDLCALC 60.0 (L) 05/17/2016     Lab Results   Component Value Date    TRIG 72 04/20/2017    TRIG 89 11/17/2016    TRIG 125 05/17/2016     Lab Results   Component Value Date    CHOLHDL 34.8 04/20/2017    CHOLHDL 29.0 11/17/2016    CHOLHDL 19.0 (L) 05/17/2016       Chemistry        Component Value Date/Time     06/22/2017 1330    K 4.1 06/22/2017 1330     06/22/2017 1330    CO2 26 06/22/2017 1330    BUN 37 (H) 06/22/2017 1330    CREATININE 2.0 (H) 06/22/2017 1330    GLU 78 06/22/2017 1330        Component Value Date/Time    CALCIUM 9.0 06/22/2017 1330    ALKPHOS 111 05/02/2017 1608    AST 26 05/02/2017 1608    ALT 11 05/02/2017 1608    BILITOT 1.0 05/02/2017 1608    ESTGFRAFRICA 40 (A) 06/22/2017 1330    EGFRNONAA 34 (A) 06/22/2017 1330          Lab Results   Component Value Date    TSH 4.317 (H) 05/17/2016     Lab Results   Component Value Date    INR 1.1 04/19/2017    INR 1.4 (H) 03/22/2016    INR 1.4 (H) 12/15/2015     Lab Results   Component Value Date    WBC 6.20 08/04/2017    HGB 13.3 (L) 08/04/2017    HCT 40.8 08/04/2017    MCV 89 08/04/2017     (L) 08/04/2017     BMP  Sodium   Date Value Ref Range Status   06/22/2017 142 136 - 145 mmol/L Final     Potassium   Date Value Ref Range Status   06/22/2017 4.1 3.5 - 5.1 mmol/L Final     Chloride   Date Value Ref Range Status   06/22/2017 106 95 - 110 mmol/L Final     CO2   Date Value Ref Range Status   06/22/2017 26 23 - 29 mmol/L Final     BUN, Bld   Date Value Ref Range Status   06/22/2017 37 (H) 8 - 23 mg/dL Final     Creatinine   Date Value Ref Range Status   06/22/2017 2.0 (H) 0.5 - 1.4 " mg/dL Final     Calcium   Date Value Ref Range Status   06/22/2017 9.0 8.7 - 10.5 mg/dL Final     Anion Gap   Date Value Ref Range Status   06/22/2017 10 8 - 16 mmol/L Final     eGFR if    Date Value Ref Range Status   06/22/2017 40 (A) >60 mL/min/1.73 m^2 Final     eGFR if non    Date Value Ref Range Status   06/22/2017 34 (A) >60 mL/min/1.73 m^2 Final     Comment:     Calculation used to obtain the estimated glomerular filtration  rate (eGFR) is the CKD-EPI equation. Since race is unknown   in our information system, the eGFR values for   -American and Non--American patients are given   for each creatinine result.       CrCl cannot be calculated (Patient's most recent sCr result is older than the maximum 7 days allowed.).    Assessment:     1. Chronic combined systolic and diastolic heart failure    2. JAHAIRA on CPAP    3. Pulmonary HTN    4. Central sleep apnea due to Cheyne-Long respiration    5. CAD, multiple vessel    6. Biventricular ICD (implantable cardioverter-defibrillator) in place    7. Hypertension associated with diabetes    8. Ischemic cardiomyopathy    9. Hyperlipidemia associated with type 2 diabetes mellitus    10. LBBB (left bundle branch block)    11. Stage 3 chronic kidney disease    Patient with elevated BNP, edema and rales on exam. He has dyspnea during visit. I think he needs to be diuresed with IV meds, but refuses. I will give him recommendations for PO diuretics over the weekend   Has no angina   BP stable  No CNS complaints to suggest TIA or CVA  No ICD  firing     Plan:     He needs to resume Bumex 2mg BID. He needs to take his PRN metolazone before morning dose throughout the weekend. Patient instructed that he should only have 3-4 doses of metolazone   RTC next week for symptom check with BMP and BNP . Patient advised to go to the ED if symptoms don't improve with PO diuretics.

## 2017-08-18 ENCOUNTER — TELEPHONE (OUTPATIENT)
Dept: PULMONOLOGY | Facility: HOSPITAL | Age: 62
End: 2017-08-18

## 2017-08-18 DIAGNOSIS — G47.33 OSA ON CPAP: Primary | Chronic | ICD-10-CM

## 2017-08-18 NOTE — TELEPHONE ENCOUNTER
----- Message from Beatrice Moon, RRT sent at 8/17/2017  8:11 AM CDT -----  Patient's 1 month compliance report is in EPIC.    Beatrice

## 2017-08-21 ENCOUNTER — TELEPHONE (OUTPATIENT)
Dept: CARDIOLOGY | Facility: CLINIC | Age: 62
End: 2017-08-21

## 2017-08-21 NOTE — TELEPHONE ENCOUNTER
I spoke with pt. He says he has only been taking the bumex once daily. He says he did take metolazone. Wt today on  Home scale is 209lbs.   He says he thinks his breathing is better.  He denies any PND or orthopnea  He does not have any BP readings.   I let him know I would discuss with the provider and call back with any new orders.

## 2017-08-24 ENCOUNTER — OFFICE VISIT (OUTPATIENT)
Dept: CARDIOLOGY | Facility: CLINIC | Age: 62
End: 2017-08-24
Payer: COMMERCIAL

## 2017-08-24 ENCOUNTER — TELEPHONE (OUTPATIENT)
Dept: CARDIOLOGY | Facility: CLINIC | Age: 62
End: 2017-08-24

## 2017-08-24 ENCOUNTER — LAB VISIT (OUTPATIENT)
Dept: LAB | Facility: HOSPITAL | Age: 62
End: 2017-08-24
Attending: NURSE PRACTITIONER
Payer: COMMERCIAL

## 2017-08-24 VITALS
HEART RATE: 66 BPM | WEIGHT: 210.75 LBS | DIASTOLIC BLOOD PRESSURE: 62 MMHG | HEIGHT: 68 IN | SYSTOLIC BLOOD PRESSURE: 104 MMHG | BODY MASS INDEX: 31.94 KG/M2

## 2017-08-24 DIAGNOSIS — E11.69 HYPERLIPIDEMIA ASSOCIATED WITH TYPE 2 DIABETES MELLITUS: ICD-10-CM

## 2017-08-24 DIAGNOSIS — Z93.3 COLOSTOMY IN PLACE: ICD-10-CM

## 2017-08-24 DIAGNOSIS — I50.42 CHRONIC COMBINED SYSTOLIC AND DIASTOLIC HEART FAILURE: Chronic | ICD-10-CM

## 2017-08-24 DIAGNOSIS — I25.5 ISCHEMIC CARDIOMYOPATHY: Primary | ICD-10-CM

## 2017-08-24 DIAGNOSIS — I25.10 CAD, MULTIPLE VESSEL: Chronic | ICD-10-CM

## 2017-08-24 DIAGNOSIS — I27.20 PULMONARY HTN: ICD-10-CM

## 2017-08-24 DIAGNOSIS — E78.5 HYPERLIPIDEMIA ASSOCIATED WITH TYPE 2 DIABETES MELLITUS: ICD-10-CM

## 2017-08-24 DIAGNOSIS — Z95.810 BIVENTRICULAR ICD (IMPLANTABLE CARDIOVERTER-DEFIBRILLATOR) IN PLACE: Chronic | ICD-10-CM

## 2017-08-24 DIAGNOSIS — I44.7 LBBB (LEFT BUNDLE BRANCH BLOCK): ICD-10-CM

## 2017-08-24 DIAGNOSIS — G47.33 OSA ON CPAP: Chronic | ICD-10-CM

## 2017-08-24 DIAGNOSIS — E11.59 HYPERTENSION ASSOCIATED WITH DIABETES: ICD-10-CM

## 2017-08-24 DIAGNOSIS — I15.2 HYPERTENSION ASSOCIATED WITH DIABETES: ICD-10-CM

## 2017-08-24 LAB
ANION GAP SERPL CALC-SCNC: 11 MMOL/L
BNP SERPL-MCNC: 423 PG/ML
BUN SERPL-MCNC: 52 MG/DL
CALCIUM SERPL-MCNC: 9.3 MG/DL
CHLORIDE SERPL-SCNC: 100 MMOL/L
CO2 SERPL-SCNC: 28 MMOL/L
CREAT SERPL-MCNC: 2.6 MG/DL
EST. GFR  (AFRICAN AMERICAN): 29 ML/MIN/1.73 M^2
EST. GFR  (NON AFRICAN AMERICAN): 25 ML/MIN/1.73 M^2
GLUCOSE SERPL-MCNC: 125 MG/DL
POTASSIUM SERPL-SCNC: 4.9 MMOL/L
SODIUM SERPL-SCNC: 139 MMOL/L

## 2017-08-24 PROCEDURE — 80048 BASIC METABOLIC PNL TOTAL CA: CPT

## 2017-08-24 PROCEDURE — 3044F HG A1C LEVEL LT 7.0%: CPT | Mod: S$GLB,,, | Performed by: NURSE PRACTITIONER

## 2017-08-24 PROCEDURE — 3066F NEPHROPATHY DOC TX: CPT | Mod: S$GLB,,, | Performed by: NURSE PRACTITIONER

## 2017-08-24 PROCEDURE — 99999 PR PBB SHADOW E&M-EST. PATIENT-LVL III: CPT | Mod: PBBFAC,,, | Performed by: NURSE PRACTITIONER

## 2017-08-24 PROCEDURE — 99214 OFFICE O/P EST MOD 30 MIN: CPT | Mod: S$GLB,,, | Performed by: NURSE PRACTITIONER

## 2017-08-24 PROCEDURE — 83880 ASSAY OF NATRIURETIC PEPTIDE: CPT

## 2017-08-24 PROCEDURE — 36415 COLL VENOUS BLD VENIPUNCTURE: CPT

## 2017-08-24 PROCEDURE — 3074F SYST BP LT 130 MM HG: CPT | Mod: S$GLB,,, | Performed by: NURSE PRACTITIONER

## 2017-08-24 PROCEDURE — 3078F DIAST BP <80 MM HG: CPT | Mod: S$GLB,,, | Performed by: NURSE PRACTITIONER

## 2017-08-24 PROCEDURE — 3008F BODY MASS INDEX DOCD: CPT | Mod: S$GLB,,, | Performed by: NURSE PRACTITIONER

## 2017-08-24 NOTE — PROGRESS NOTES
"Subjective:   Patient ID:  Yan Choudhury is a 62 y.o. male who presents for follow up of Congestive Heart Failure      HPI    Patient presents to clinic for follow up after being instructed to take Metolazone 2.5mg daily x 4 days. He continued Bumex 2mg BID. On last visit, patient had LIM, orthopnea, PND, edema and was even SOB during conversation. He has PMH of ischemic cardiomyopathy with EF of 30%. Hs not had any ICD firing. No chest pain or angina. SOB has improved. Patient not having any LIM. Not coughing or having orthopnea or PND. Weight down from last visit. Labs in progress. He has now resumed Bumex 2mg BID. Doing ok with sodium intake and limiting fluid intake     Past Medical History:   Diagnosis Date    Arthritis     CAD (coronary artery disease)     Cataract     CHF (congestive heart failure)     Dr Calvillo    Chronic combined systolic and diastolic congestive heart failure 3/24/2015    CKD (chronic kidney disease), stage III     Diabetes mellitus type II      AM    Diabetic retinopathy     anti Veg F injections for macular edema    Diverticulitis of colon     ED (erectile dysfunction)     Glaucoma     HTN (hypertension)     Hyperlipidemia     Ischemic cardiomyopathy     Obesity     Pulmonary HTN        Past Surgical History:   Procedure Laterality Date    CARDIAC CATHETERIZATION  2009    CHOLECYSTECTOMY      COLONOSCOPY      EYE SURGERY      KNEE SURGERY         Social History   Substance Use Topics    Smoking status: Never Smoker    Smokeless tobacco: Never Used    Alcohol use 0.0 oz/week      Comment: " once in awhile...special occassions"       Family History   Problem Relation Age of Onset    Cancer Mother     Heart disease Father     Stroke Father     No Known Problems Sister     No Known Problems Brother     No Known Problems Maternal Aunt     No Known Problems Maternal Uncle     No Known Problems Paternal Aunt     No Known Problems Paternal Uncle     No Known " "Problems Maternal Grandmother     No Known Problems Maternal Grandfather     No Known Problems Paternal Grandmother     No Known Problems Paternal Grandfather     Amblyopia Neg Hx     Blindness Neg Hx     Cataracts Neg Hx     Diabetes Neg Hx     Glaucoma Neg Hx     Hypertension Neg Hx     Macular degeneration Neg Hx     Retinal detachment Neg Hx     Strabismus Neg Hx     Thyroid disease Neg Hx        Current Outpatient Prescriptions   Medication Sig    allopurinol (ZYLOPRIM) 100 MG tablet TAKE 1 TABLET (100 MG TOTAL) BY MOUTH ONCE DAILY.    aspirin (ECOTRIN) 81 MG EC tablet Take 81 mg by mouth once daily.    BD INSULIN SYRINGE ULTRA-FINE 0.5 mL 31 gauge x 5/16 Syrg USE AS DIRECTED TO INJECT INSULIN TWO TIMES DAILY    bumetanide (BUMEX) 2 MG tablet Take 1 tablet (2 mg total) by mouth once daily.    carvedilol (COREG) 25 MG tablet Take 0.5 tablets (12.5 mg total) by mouth 2 (two) times daily with meals. Takes one-half tablet by mouth twice a day    guaifenesin (MUCINEX) 600 mg 12 hr tablet Take 1,200 mg by mouth 2 (two) times daily as needed.     insulin glargine (LANTUS) 100 unit/mL injection 50 units bid    insulin needles, disposable, (BD ULTRA-FINE JOSLYN PEN NEEDLES) 32 x 5/32 " Ndle 1 each by Misc.(Non-Drug; Combo Route) route 4 (four) times daily.    IRON/VITAMIN B COMPLEX (GERITOL ORAL) Take by mouth.    lisinopril (PRINIVIL,ZESTRIL) 5 MG tablet Take 1 tablet (5 mg total) by mouth once daily.    pantoprazole (PROTONIX) 40 MG tablet TAKE 1 TABLET BY MOUTH EVERY DAY FOR ACID REFLUX    potassium chloride SA (K-DUR,KLOR-CON) 10 MEQ tablet TAKE 1 TABLET BY MOUTH EVERY DAY    predniSONE (DELTASONE) 20 MG tablet Take one tablet twice daily for 3 days, then take one tablet daily for 2 days, then take 1/2 tablet daily for 2 days.    simvastatin (ZOCOR) 10 MG tablet Take 1 tablet (10 mg total) by mouth every evening.     No current facility-administered medications for this visit.      Current " "Outpatient Prescriptions on File Prior to Visit   Medication Sig    allopurinol (ZYLOPRIM) 100 MG tablet TAKE 1 TABLET (100 MG TOTAL) BY MOUTH ONCE DAILY.    aspirin (ECOTRIN) 81 MG EC tablet Take 81 mg by mouth once daily.    BD INSULIN SYRINGE ULTRA-FINE 0.5 mL 31 gauge x 5/16 Syrg USE AS DIRECTED TO INJECT INSULIN TWO TIMES DAILY    bumetanide (BUMEX) 2 MG tablet Take 1 tablet (2 mg total) by mouth once daily.    carvedilol (COREG) 25 MG tablet Take 0.5 tablets (12.5 mg total) by mouth 2 (two) times daily with meals. Takes one-half tablet by mouth twice a day    guaifenesin (MUCINEX) 600 mg 12 hr tablet Take 1,200 mg by mouth 2 (two) times daily as needed.     insulin glargine (LANTUS) 100 unit/mL injection 50 units bid    insulin needles, disposable, (BD ULTRA-FINE JOSLYN PEN NEEDLES) 32 x 5/32 " Ndle 1 each by Misc.(Non-Drug; Combo Route) route 4 (four) times daily.    IRON/VITAMIN B COMPLEX (GERITOL ORAL) Take by mouth.    lisinopril (PRINIVIL,ZESTRIL) 5 MG tablet Take 1 tablet (5 mg total) by mouth once daily.    pantoprazole (PROTONIX) 40 MG tablet TAKE 1 TABLET BY MOUTH EVERY DAY FOR ACID REFLUX    potassium chloride SA (K-DUR,KLOR-CON) 10 MEQ tablet TAKE 1 TABLET BY MOUTH EVERY DAY    predniSONE (DELTASONE) 20 MG tablet Take one tablet twice daily for 3 days, then take one tablet daily for 2 days, then take 1/2 tablet daily for 2 days.    simvastatin (ZOCOR) 10 MG tablet Take 1 tablet (10 mg total) by mouth every evening.     No current facility-administered medications on file prior to visit.        Review of Systems   Constitution: Positive for weight loss. Negative for decreased appetite, weakness, malaise/fatigue and weight gain.   HENT: Negative for nosebleeds.    Cardiovascular: Negative for chest pain, claudication, dyspnea on exertion, irregular heartbeat, leg swelling, near-syncope, orthopnea, palpitations, paroxysmal nocturnal dyspnea and syncope.   Respiratory: Positive for shortness " "of breath. Negative for cough, sleep disturbances due to breathing, snoring and wheezing.    Hematologic/Lymphatic: Negative for bleeding problem. Does not bruise/bleed easily.   Skin: Negative for rash.   Musculoskeletal: Negative for arthritis, back pain, falls, joint pain, joint swelling, muscle cramps, muscle weakness and myalgias.   Gastrointestinal: Negative for bloating, abdominal pain, constipation, diarrhea, heartburn, nausea and vomiting.   Genitourinary: Negative for dysuria, hematuria and nocturia.   Neurological: Negative for excessive daytime sleepiness, dizziness, light-headedness, loss of balance, numbness, paresthesias and vertigo.       Objective:   Physical Exam   Constitutional: He is oriented to person, place, and time. He appears well-developed and well-nourished.   Neck: Neck supple. No JVD present.   Cardiovascular: Normal rate, regular rhythm, normal heart sounds and normal pulses.  Exam reveals no friction rub.    No murmur heard.  Pulmonary/Chest: Effort normal and breath sounds normal. No respiratory distress. He has no wheezes. He has no rales.   Abdominal: Soft. Bowel sounds are normal. He exhibits no distension.   Colostomy      Musculoskeletal: He exhibits no edema or tenderness.   Neurological: He is alert and oriented to person, place, and time.   Skin: Skin is warm and dry. No rash noted.   Psychiatric: He has a normal mood and affect. His behavior is normal.   Nursing note and vitals reviewed.    Vitals:    08/24/17 1314 08/24/17 1318   BP: 100/60 104/62   BP Location: Left arm Right arm   Patient Position: Sitting Sitting   BP Method: Large (Manual) Large (Manual)   Pulse: 66    Weight: 95.6 kg (210 lb 12.2 oz)    Height: 5' 8" (1.727 m)      Lab Results   Component Value Date    CHOL 112 (L) 04/20/2017    CHOL 107 (L) 11/17/2016    CHOL 105 (L) 05/17/2016     Lab Results   Component Value Date    HDL 39 (L) 04/20/2017    HDL 31 (L) 11/17/2016    HDL 20 (L) 05/17/2016     Lab " Results   Component Value Date    LDLCALC 58.6 (L) 04/20/2017    LDLCALC 58.2 (L) 11/17/2016    LDLCALC 60.0 (L) 05/17/2016     Lab Results   Component Value Date    TRIG 72 04/20/2017    TRIG 89 11/17/2016    TRIG 125 05/17/2016     Lab Results   Component Value Date    CHOLHDL 34.8 04/20/2017    CHOLHDL 29.0 11/17/2016    CHOLHDL 19.0 (L) 05/17/2016       Chemistry        Component Value Date/Time     06/22/2017 1330    K 4.1 06/22/2017 1330     06/22/2017 1330    CO2 26 06/22/2017 1330    BUN 37 (H) 06/22/2017 1330    CREATININE 2.0 (H) 06/22/2017 1330    GLU 78 06/22/2017 1330        Component Value Date/Time    CALCIUM 9.0 06/22/2017 1330    ALKPHOS 111 05/02/2017 1608    AST 26 05/02/2017 1608    ALT 11 05/02/2017 1608    BILITOT 1.0 05/02/2017 1608    ESTGFRAFRICA 40 (A) 06/22/2017 1330    EGFRNONAA 34 (A) 06/22/2017 1330          Lab Results   Component Value Date    TSH 4.317 (H) 05/17/2016     Lab Results   Component Value Date    INR 1.1 04/19/2017    INR 1.4 (H) 03/22/2016    INR 1.4 (H) 12/15/2015     Lab Results   Component Value Date    WBC 6.20 08/04/2017    HGB 13.3 (L) 08/04/2017    HCT 40.8 08/04/2017    MCV 89 08/04/2017     (L) 08/04/2017     BMP  Sodium   Date Value Ref Range Status   06/22/2017 142 136 - 145 mmol/L Final     Potassium   Date Value Ref Range Status   06/22/2017 4.1 3.5 - 5.1 mmol/L Final     Chloride   Date Value Ref Range Status   06/22/2017 106 95 - 110 mmol/L Final     CO2   Date Value Ref Range Status   06/22/2017 26 23 - 29 mmol/L Final     BUN, Bld   Date Value Ref Range Status   06/22/2017 37 (H) 8 - 23 mg/dL Final     Creatinine   Date Value Ref Range Status   06/22/2017 2.0 (H) 0.5 - 1.4 mg/dL Final     Calcium   Date Value Ref Range Status   06/22/2017 9.0 8.7 - 10.5 mg/dL Final     Anion Gap   Date Value Ref Range Status   06/22/2017 10 8 - 16 mmol/L Final     eGFR if    Date Value Ref Range Status   06/22/2017 40 (A) >60  mL/min/1.73 m^2 Final     eGFR if non    Date Value Ref Range Status   06/22/2017 34 (A) >60 mL/min/1.73 m^2 Final     Comment:     Calculation used to obtain the estimated glomerular filtration  rate (eGFR) is the CKD-EPI equation. Since race is unknown   in our information system, the eGFR values for   -American and Non--American patients are given   for each creatinine result.       CrCl cannot be calculated (Patient's most recent sCr result is older than the maximum 7 days allowed.).    Assessment:     1. Ischemic cardiomyopathy    2. JAHAIRA on CPAP    3. Pulmonary HTN    4. Biventricular ICD (implantable cardioverter-defibrillator) in place    5. CAD, multiple vessel    6. Chronic combined systolic and diastolic heart failure    7. Hyperlipidemia associated with type 2 diabetes mellitus    8. Hypertension associated with diabetes    9. LBBB (left bundle branch block)    10. Colostomy in place    Has responded well to metolazone over the last few days. BMP and BNP pending. His symptoms have improved and are pretty much resolved. I will call him with the lab results and give further instructions at that time  No ICD firing   BP stable  No angina or equivalent    Plan:     Will have him continue Bumex 2mg BID for now and use the Metolazone PRN. Phone review of results and further recommendations as needed. Otherwise, RTC in 3 months

## 2017-08-24 NOTE — TELEPHONE ENCOUNTER
The patient has been notified of this information and all questions answered.    Spoke with pt and pt wife notified both instructions per [place] both verbalized understanding repeated orders back.

## 2017-08-24 NOTE — TELEPHONE ENCOUNTER
Continue to hold the metolazone and only use PRN by my instructions. Continue Bumex 2mg BID for now

## 2017-09-08 ENCOUNTER — TELEPHONE (OUTPATIENT)
Dept: INTERNAL MEDICINE | Facility: CLINIC | Age: 62
End: 2017-09-08

## 2017-09-08 NOTE — TELEPHONE ENCOUNTER
----- Message from Shantel Turner sent at 9/8/2017 11:18 AM CDT -----  Contact: Patients wife, Elsie Zimmerman states that [facility] [employer] has been trying to contact office regarding a clearance for patient to have teeth extractions, but has not heard back from anyone, please call Ms Zimmerman back at [phone]. Thank you

## 2017-09-12 ENCOUNTER — OFFICE VISIT (OUTPATIENT)
Dept: OPHTHALMOLOGY | Facility: CLINIC | Age: 62
End: 2017-09-12
Payer: COMMERCIAL

## 2017-09-12 ENCOUNTER — NUTRITION (OUTPATIENT)
Dept: DIABETES | Facility: CLINIC | Age: 62
End: 2017-09-12
Payer: COMMERCIAL

## 2017-09-12 VITALS — WEIGHT: 214.31 LBS | HEIGHT: 68 IN | BODY MASS INDEX: 32.48 KG/M2

## 2017-09-12 DIAGNOSIS — E11.22 TYPE 2 DIABETES MELLITUS WITH CHRONIC KIDNEY DISEASE, WITH LONG-TERM CURRENT USE OF INSULIN, UNSPECIFIED CKD STAGE: Primary | ICD-10-CM

## 2017-09-12 DIAGNOSIS — Z79.4 TYPE 2 DIABETES MELLITUS WITH CHRONIC KIDNEY DISEASE, WITH LONG-TERM CURRENT USE OF INSULIN, UNSPECIFIED CKD STAGE: Primary | ICD-10-CM

## 2017-09-12 DIAGNOSIS — H40.1122 PRIMARY OPEN ANGLE GLAUCOMA OF LEFT EYE, MODERATE STAGE: Primary | ICD-10-CM

## 2017-09-12 PROCEDURE — 99999 PR PBB SHADOW E&M-EST. PATIENT-LVL II: CPT | Mod: PBBFAC,,, | Performed by: OPHTHALMOLOGY

## 2017-09-12 PROCEDURE — G0108 DIAB MANAGE TRN  PER INDIV: HCPCS | Mod: S$GLB,,, | Performed by: DIETITIAN, REGISTERED

## 2017-09-12 PROCEDURE — 99999 PR PBB SHADOW E&M-EST. PATIENT-LVL III: CPT | Mod: PBBFAC,,, | Performed by: DIETITIAN, REGISTERED

## 2017-09-12 PROCEDURE — 92012 INTRM OPH EXAM EST PATIENT: CPT | Mod: S$GLB,,, | Performed by: OPHTHALMOLOGY

## 2017-09-12 RX ORDER — LANCETS
1 EACH MISCELLANEOUS 4 TIMES DAILY
Qty: 120 EACH | Refills: 11 | Status: SHIPPED | OUTPATIENT
Start: 2017-09-12 | End: 2018-05-02 | Stop reason: SDUPTHER

## 2017-09-12 NOTE — LETTER
September 12, 2017        Sandra Estevez MD  [address] [facility] [place] [address]             [facility] - Diabetes [facility]  [address] [facility] [place] [address]  Phone: [phone]  Fax: [phone]   Patient: Yan Choudhury   MR Number: [id number]   YOB: 1955   Date of Visit: 9/12/2017       Dear Dr. Estevez:    Thank you for referring Yan Choudhury to me for evaluation. Below are the relevant portions of my assessment and plan of care.    If you have questions, please do not hesitate to call me. I look forward to following Yan along with you.    Sincerely,      Otilia Prado, AMAN, CDE           CC  No Recipients

## 2017-09-12 NOTE — PROGRESS NOTES
Diabetes Education  Author: Otilia Prado RD, CDE  Date: 9/12/2017    Diabetes Education Visit  Diabetes Education Record Assessment/Progress: Post Program/Follow-up    Diabetes Type  Diabetes Type : Type II         Nutrition  Meal Planning:  (8923-5622 cals/d w/ excess carb from juices. Excess sodium from regular tomato herminio. He appears to limit saturated fat and still uses glucerna as meal replacement.)    Monitoring   Self Monitoring : Per glucometer review, pp glu 84-200s. Irregular BG patterns appear from irregular Lantus insulin doses.      Exercise   Exercise Type: walking  Intensity: Low  Frequency:  (1x/wk)  Duration: 15 min    Current Diabetes Treatment   Current Treatment: Diet, Exercise, Insulin (lantus 50 units twice daily)    Social History  Preferred Learning Method: Face to Face  Primary Support: Self, Spouse  Smoking Status: Never a Smoker  Alcohol Use: Rarely     Barriers to Change  Barriers to Change: None  Learning Challenges : None    Readiness to Learn   Readiness to Learn : Acceptance    Cultural Influences  Cultural Influences: No    Diabetes Education Assessment/Progress  Acute Complications (preventing, detecting, and treating acute complications): Discussion, Competent (verbalizes/demonstrates), Individual Session, Competent Family/SO, Written Materials Provided  Chronic Complications (preventing, detecting, and treating chronic complications): Competent (verbalizes/demonstrates), Competent Family/SO, Discussion, Individual Session  Diabetes Disease Process (diabetes disease process and treatment options): Discussion, Competent (verbalizes/demonstrates), Competent Family/SO, Individual Session  Nutrition (Incorporating nutritional management into one's lifestyle): Discussion, Requires Assistance, Competent Family/SO, Competent (verbalizes/demonstrates), Individual Session, Written Materials Provided (Emphasis on lower sodium choices due to CHF and Renal.)  Physical Activity  (incorporating physical activity into one's lifestyle): Discussion, Requires Assistance, Competent Family/SO, Competent (verbalizes/demonstrates), Individual Session  Medications (states correct name, dose, onset, peak, duration, side effects & timing of meds): Written Materials Provided, Discussion, Competent (verbalizes/demonstrates), Competent Family/SO, Requires Assistance, Individual Session (Counseling provided on Lantus action and need for maintenance amount to prevent BG excursions. Pt agrees to take Lantus 30 units am daily when BG readings are in lower range () instead of holding dose. He will continue Lantus 50 units pm daily. )  Monitoring (monitoring blood glucose/other parameters & using results): Discussion, Competent Family/SO, Competent (verbalizes/demonstrates), Requires Assistance, Written Materials Provided, Individual Session, Demonstration  Goal Setting and Problem Solving (verbalizes behavior change strategies & sets realistic goals): Discussion, Competent (verbalizes/demonstrates), Competent Family/SO, Requires Assistance, Individual Session  Behavior Change (developing personal strategies to health & behavior change): Discussion, Competent Family/SO, Comnpetent (verbalizes/demonstrates), Requires Assistance, Individual Session  Psychosocial Issues (developing personal srategies to address psychosocial concerns): Discussion, Competent (verbalizes/demonstrates), Competent Family/SO, Requires Assostance, Individual Session    Goals  Monitoring: Set (test bg 2-4x/d - fasting, ac)  Start Date: 09/12/17  Target Date: 12/12/17    Diabetes Self-Management Support Plan  Diabetes Learning:  (RD/CDE via phone to report weekly BG readings. 3mos fu)  Stress Management: friends  Review Status: Patient has selected and agrees to support plan.    Diabetes Care Plan/Intervention  Education Plan/Intervention: Individual Follow-Up DSMT   Pt requests A1C, micro/cr to be coord at annual PCP visit in  November.    Diabetes Meal Plan  Restrictions: Low Fat, Low Sodium  Calories: 1800  Carbohydrate Per Meal: 45-60g  Carbohydrate Per Snack : 15-20g, 15-30g    Education Units of Time   Time Spent: 30 min      Health Maintenance Topics with due status: Not Due       Topic Last Completion Date    Pneumococcal PPSV23 (Medium Risk) 02/05/2014    Colonoscopy 01/06/2015    TETANUS VACCINE 10/16/2015    PROSTATE-SPECIFIC ANTIGEN 11/17/2016    Foot Exam 11/17/2016    Lipid Panel 04/20/2017    Hemoglobin A1c 04/20/2017    Eye Exam 05/15/2017     Health Maintenance Due   Topic Date Due    Zoster Vaccine  05/12/2013    Influenza Vaccine  08/01/2017

## 2017-09-12 NOTE — PROGRESS NOTES
"    ===============================  09/12/2017   Yan Choudhury,   62 y.o. male   Last visit [facility]: :5/15/2017   Last visit eye dept. 5/15/2017  [facility]:  Corrected distance visual acuity was NLP in the right eye and 20/40 in the left eye.  Tonometry     Tonometry (Applanation, 11:47 AM)       Right Left    Pressure  15               Not recorded         Not recorded        Chief Complaint   Patient presents with    Glaucoma     4 MONTH IOP CHECK        HPI     Glaucoma    Additional comments: 4 MONTH IOP CHECK           Comments   --DM since 2000  --H/O BDR and DME OS  H/o [facility] 2014  --Focal OS June 2011  --VRTX/VMA OS "would not expect improvement with treatment secondary to   VRTX"  --Prosthesis OD secondary to trauma  --COAG   OFF GTTS  Tmax 25  (-1)  Gonio Open  Low RNFL  Last VF Bjerrum OS 3/2016       Last edited by Estella Forbes on 9/12/2017 11:09 AM. (History)      Read Studies:   Vitals  ________________  9/12/2017  Problem List Items Addressed This Visit        Eye/Vision problems    Primary open-angle glaucoma, moderate stage - Primary  IOP good, follow      Other Visit Diagnoses    None.       rtc 3 months with SDP, VF, dilate OS.  .       ===========================    "

## 2017-09-18 ENCOUNTER — OFFICE VISIT (OUTPATIENT)
Dept: PULMONOLOGY | Facility: CLINIC | Age: 62
End: 2017-09-18
Payer: COMMERCIAL

## 2017-09-18 VITALS
HEART RATE: 77 BPM | OXYGEN SATURATION: 97 % | SYSTOLIC BLOOD PRESSURE: 120 MMHG | WEIGHT: 217.94 LBS | DIASTOLIC BLOOD PRESSURE: 83 MMHG | RESPIRATION RATE: 18 BRPM | BODY MASS INDEX: 33.03 KG/M2 | HEIGHT: 68 IN

## 2017-09-18 DIAGNOSIS — E66.01 SEVERE OBESITY (BMI 35.0-39.9) WITH COMORBIDITY: ICD-10-CM

## 2017-09-18 DIAGNOSIS — R06.3 CENTRAL SLEEP APNEA DUE TO CHEYNE-STOKES RESPIRATION: ICD-10-CM

## 2017-09-18 DIAGNOSIS — I50.40: ICD-10-CM

## 2017-09-18 DIAGNOSIS — G47.33 OSA ON CPAP: Primary | Chronic | ICD-10-CM

## 2017-09-18 PROCEDURE — 99214 OFFICE O/P EST MOD 30 MIN: CPT | Mod: S$GLB,,, | Performed by: INTERNAL MEDICINE

## 2017-09-18 PROCEDURE — 99999 PR PBB SHADOW E&M-EST. PATIENT-LVL IV: CPT | Mod: PBBFAC,,, | Performed by: INTERNAL MEDICINE

## 2017-09-18 PROCEDURE — 3008F BODY MASS INDEX DOCD: CPT | Mod: S$GLB,,, | Performed by: INTERNAL MEDICINE

## 2017-09-18 PROCEDURE — 3074F SYST BP LT 130 MM HG: CPT | Mod: S$GLB,,, | Performed by: INTERNAL MEDICINE

## 2017-09-18 PROCEDURE — 3079F DIAST BP 80-89 MM HG: CPT | Mod: S$GLB,,, | Performed by: INTERNAL MEDICINE

## 2017-09-18 NOTE — PROGRESS NOTES
"Subjective:       Patient ID: Yan Choudhury is a 62 y.o. male.    Chief Complaint: evan on cpap    Mr. Yan Choudhury is 62 years old  Compliance with Therapy has been poor.  He has had failure with an AICD.  Renal study he had EVAN on chest of breathing.  He has ejection fraction of 30% with heart failure.  We had changes CPAP pressure to 8 cm water pressure however his residual AHI is 39.7.  He has used the device every day however his usage greater than 4 hours was only 10%  It appears that his heart failure is optimize his weight has dropped from 229 pounds to 217 pounds  I am going to arrange for him to get a BiPAP titration with oxygen bleed  Meantime I'm going to drop his pressure down from 8 cm to 5 cm        Review of Systems   Constitutional: Negative.    HENT: Negative.    Eyes: Negative.    Respiratory: Negative.    Cardiovascular: Negative.    Genitourinary: Negative.    Endocrine: endocrine negative   Musculoskeletal: Negative.    Skin: Negative.    Gastrointestinal: Negative.    Neurological: Negative.    Psychiatric/Behavioral: Negative.        Objective:       Vitals:    09/18/17 1320   BP: 120/83   Pulse: 77   Resp: 18   SpO2: 97%   Weight: 98.9 kg (217 lb 14.8 oz)   Height: 5' 8" (1.727 m)       Physical Exam   Constitutional: He is oriented to person, place, and time. He appears well-developed and well-nourished.   HENT:   Head: Normocephalic.   Nose: Nose normal.   Mouth/Throat: Oropharynx is clear and moist.   Neck: Normal range of motion. Neck supple.   Cardiovascular: Normal rate, regular rhythm and normal heart sounds.    Pulmonary/Chest: Normal expansion, symmetric chest wall expansion and effort normal.   Abdominal: Soft.   Musculoskeletal: Normal range of motion.   Lymphadenopathy:     He has no cervical adenopathy.   Neurological: He is alert and oriented to person, place, and time.   Skin: Skin is warm and dry.   Psychiatric: He has a normal mood and affect.   Nursing note and vitals " reviewed.    Personal Diagnostic Review  Download  8/19/2017 - 9/17/2017  YOB: 1955  Mask:  Compliance Summary  8/19/2017 - 9/17/2017 (30 days)  Days with Device Usage 30 days  Days without Device Usage 0 days  Percent Days with Device Usage 100.0%  Cumulative Usage 3 days 17 hrs. 5 mins. 44 secs.  Maximum Usage (1 Day) 5 hrs. 2 mins. 51 secs.  Average Usage (All Days) 2 hrs. 58 mins. 11 secs.  Average Usage (Days Used) 2 hrs. 58 mins. 11 secs.  Minimum Usage (1 Day) 6 mins. 17 secs.  Percent of Days with Usage >= 4 Hours 10.0%  Percent of Days with Usage < 4 Hours 90.0%  Date Range  Total Blower Time 3 days 18 hrs. 18 mins. 40 secs.  Sleep Therapy Statistics  Average Time in Large Leak Per Day 31 mins. 34 secs.  Average AHI 39.7  CPAP 8.0 cmH2O  No flowsheet data found.      Assessment:       Problem List Items Addressed This Visit     JAHAIRA on CPAP - Primary (Chronic)    Relevant Orders    CPAP Titration (Must have dx of JAHAIRA from previous sleep study.)    Central sleep apnea due to Cheyne-Long respiration    Relevant Orders    CPAP Titration (Must have dx of JAHAIRA from previous sleep study.)    CHF NYHA class II    Relevant Orders    CPAP Titration (Must have dx of JAHAIRA from previous sleep study.)    Severe obesity (BMI 35.0-39.9) with comorbidity      Other Visit Diagnoses    None.         Plan:         Repeat BIPAP titration  Trial back to Drop CPAP to 5.0 cm  Adherence usage> 4 hrs stressed     Return in about 6 weeks (around 10/30/2017) for Download, CPAP supplies, Follow up Sleep Clinic after test PSG/HSAT/CPAP, Elizabeth Lejeune NP, PSG.    This note was prepared using voice recognition system and is likely to have sound alike errors that may have been overlooked even after proof reading.  Please call me with any questions    Discussed diagnosis, its evaluation, treatment and usual course. All questions answered.    Thank you for the courtesy of participating in the care of this  patient    Daniel Arriaga MD

## 2017-09-20 ENCOUNTER — TELEPHONE (OUTPATIENT)
Dept: CARDIOLOGY | Facility: CLINIC | Age: 62
End: 2017-09-20

## 2017-09-20 NOTE — TELEPHONE ENCOUNTER
----- Message from Ta Patterson sent at 9/20/2017  1:46 PM CDT -----  Contact: [phone]  Would like to consult with nurse about fit in appt on 9/25. Pt has other appt on this day asking to schedule then. Please call back at [phone]. Thx. yulia

## 2017-10-01 ENCOUNTER — HOSPITAL ENCOUNTER (OUTPATIENT)
Dept: SLEEP MEDICINE | Facility: HOSPITAL | Age: 62
Discharge: HOME | End: 2017-10-01
Attending: INTERNAL MEDICINE
Payer: COMMERCIAL

## 2017-10-01 DIAGNOSIS — R06.3 CENTRAL SLEEP APNEA DUE TO CHEYNE-STOKES RESPIRATION: ICD-10-CM

## 2017-10-01 DIAGNOSIS — G47.33 OSA ON CPAP: Chronic | ICD-10-CM

## 2017-10-01 DIAGNOSIS — I50.40: ICD-10-CM

## 2017-10-01 PROCEDURE — 95811 POLYSOM 6/>YRS CPAP 4/> PARM: CPT

## 2017-10-01 PROCEDURE — 95811 POLYSOM 6/>YRS CPAP 4/> PARM: CPT | Mod: 26,,, | Performed by: INTERNAL MEDICINE

## 2017-10-01 NOTE — Clinical Note
"STUDY PARAMETERS: The study was performed with a sleep technologist in attendance for the entire test period.  Video monitoring was carried out throughout the study, and the following clinical parameters were recorded:  SUMMARY STATEMENTS: 1. Findings related to sleep diagnoses: " No snoring. " This was a full night BiPAP titration study.  BiPAP was initiated at 8.0/6.0 cm, with a full face Mirage Quattro large mask.  C-flex (set to 3) and heated humidification were used throughout.  Central apnea was present in all titration pressures.  Residual AHI still remained high. 2. EEG abnormalities: " No slow-wave sleep.  Increased N1 stage sleep.  Arousal was also high 41.6 events per hour. " No periodic limb movements. 3. ECG abnormalities: " Some heart rate variability.  INTERPRETATION:  1. Neither CPAP normal BiPAP demonstrated any beneficial impact on patient's respiratory events which appeared to be predominantly driven by his cardiac status. 2. Nocturnal hypoxemia in signal heart failure will be"

## 2017-10-03 ENCOUNTER — TELEPHONE (OUTPATIENT)
Dept: PULMONOLOGY | Facility: CLINIC | Age: 62
End: 2017-10-03

## 2017-10-03 NOTE — TELEPHONE ENCOUNTER
Use still suboptimal    Residual AHI High but on Decline  EF 30% not candidate for ASV  May consider repeat BIPAP titration    Daniel Arriaga MD    9/18/2017 - 10/1/2017  YOB: 1955  Mask:  Compliance Summary  9/18/2017 - 10/1/2017 (14 days)  Days with Device Usage 11 days  Days without Device Usage 3 days  Percent Days with Device Usage 78.6%  Cumulative Usage 1 day 10 hrs. 41 mins. 15 secs.  Maximum Usage (1 Day) 4 hrs. 21 mins. 49 secs.  Average Usage (All Days) 2 hrs. 28 mins. 39 secs.  Average Usage (Days Used) 3 hrs. 9 mins. 12 secs.  Minimum Usage (1 Day) 40 mins. 23 secs.  Percent of Days with Usage >= 4 Hours 14.3%  Percent of Days with Usage < 4 Hours 85.7%  Date Range  Total Blower Time 1 day 14 hrs. 12 mins. 57 secs.  Sleep Therapy Statistics  Average Time in Large Leak Per Day 30 mins. 11 secs.  Average AHI 31.0  CPAP 5.0 cmH2O

## 2017-10-03 NOTE — TELEPHONE ENCOUNTER
----- Message from Michelle Gonzalez sent at 10/3/2017  4:10 PM CDT -----  2 week download is in [employer].

## 2017-10-10 NOTE — PROCEDURES
"STUDY PARAMETERS: The study was performed with a sleep technologist in attendance for the entire test period.  Video monitoring was carried out throughout the study, and the following clinical parameters were recorded:    SUMMARY STATEMENTS:  1. Findings related to sleep diagnoses:   No snoring.   This was a full night BiPAP titration study.  BiPAP was initiated at 8.0/6.0 cm, with a full face Mirage Quattro large mask.  C-flex (set to 3) and heated humidification were used throughout.  Central apnea was present in all titration pressures.  Residual AHI still remained high.  2. EEG abnormalities:   No slow-wave sleep.  Increased N1 stage sleep.  Arousal was also high 41.6 events per hour.   No periodic limb movements.  3. ECG abnormalities:   Some heart rate variability.    INTERPRETATION:   1. Neither CPAP normal BiPAP demonstrated any beneficial impact on patients respiratory events which appeared to be predominantly driven by his cardiac status.  2. Nocturnal hypoxemia in signal heart failure will benefit from oxygen supplementation.  3. Abnormal sleep architecture with no slow-wave sleep.  4. No obstructive apneas or obstructive hypopneas noted.    RECOMMENDATIONS:     1. Optimize heart failure  2. Supplementary oxygen with sleep  3. Well discontinue PAP therapy        See imported Sleep Study result in "Chart Review" under the   "Media tab".      (This Sleep Study was interpreted by a Board Certified Sleep   Specialist who conducted an epoch-by-epoch review of the entire   raw data recording.)     (The indication for this sleep study was reviewed and deemed   appropriate by [facility] Practice Parameters or other reasons by a   Board Certified Sleep Specialist.)    Daniel Arriaga MD      "

## 2017-10-13 ENCOUNTER — TELEPHONE (OUTPATIENT)
Dept: CARDIOLOGY | Facility: CLINIC | Age: 62
End: 2017-10-13

## 2017-10-13 NOTE — TELEPHONE ENCOUNTER
----- Message from Aida Rollins sent at 10/13/2017 12:04 PM CDT -----  Contact: wife  Wife need to know if nurse received medical clearance from pt dentist, [facility] sent over a form to be completed by nurse.please call wife @ [phone] or [phone].

## 2017-10-13 NOTE — TELEPHONE ENCOUNTER
Returned pt wife call states she will have [facility] fax over clearance paper to our office.

## 2017-10-19 ENCOUNTER — PATIENT OUTREACH (OUTPATIENT)
Dept: ADMINISTRATIVE | Facility: HOSPITAL | Age: 62
End: 2017-10-19

## 2017-10-19 DIAGNOSIS — E11.22 TYPE 2 DIABETES MELLITUS WITH STAGE 3 CHRONIC KIDNEY DISEASE, WITH LONG-TERM CURRENT USE OF INSULIN: ICD-10-CM

## 2017-10-19 DIAGNOSIS — Z79.4 TYPE 2 DIABETES MELLITUS WITH STAGE 3 CHRONIC KIDNEY DISEASE, WITH LONG-TERM CURRENT USE OF INSULIN: ICD-10-CM

## 2017-10-19 DIAGNOSIS — I15.2 HYPERTENSION ASSOCIATED WITH DIABETES: Primary | ICD-10-CM

## 2017-10-19 DIAGNOSIS — N18.30 TYPE 2 DIABETES MELLITUS WITH STAGE 3 CHRONIC KIDNEY DISEASE, WITH LONG-TERM CURRENT USE OF INSULIN: ICD-10-CM

## 2017-10-19 DIAGNOSIS — E11.69 HYPERLIPIDEMIA ASSOCIATED WITH TYPE 2 DIABETES MELLITUS: ICD-10-CM

## 2017-10-19 DIAGNOSIS — E11.59 HYPERTENSION ASSOCIATED WITH DIABETES: Primary | ICD-10-CM

## 2017-10-19 DIAGNOSIS — E78.5 HYPERLIPIDEMIA ASSOCIATED WITH TYPE 2 DIABETES MELLITUS: ICD-10-CM

## 2017-10-19 RX ORDER — ALLOPURINOL 100 MG/1
TABLET ORAL
Qty: 90 TABLET | Refills: 0 | Status: SHIPPED | OUTPATIENT
Start: 2017-10-19 | End: 2018-01-03 | Stop reason: SDUPTHER

## 2017-10-25 ENCOUNTER — OFFICE VISIT (OUTPATIENT)
Dept: SLEEP MEDICINE | Facility: CLINIC | Age: 62
End: 2017-10-25
Payer: COMMERCIAL

## 2017-10-25 ENCOUNTER — TELEPHONE (OUTPATIENT)
Dept: CARDIOLOGY | Facility: HOSPITAL | Age: 62
End: 2017-10-25

## 2017-10-25 ENCOUNTER — LAB VISIT (OUTPATIENT)
Dept: LAB | Facility: HOSPITAL | Age: 62
End: 2017-10-25
Attending: NURSE PRACTITIONER
Payer: COMMERCIAL

## 2017-10-25 VITALS
HEART RATE: 74 BPM | WEIGHT: 224.88 LBS | DIASTOLIC BLOOD PRESSURE: 70 MMHG | RESPIRATION RATE: 17 BRPM | HEIGHT: 68 IN | SYSTOLIC BLOOD PRESSURE: 120 MMHG | OXYGEN SATURATION: 99 % | BODY MASS INDEX: 34.08 KG/M2

## 2017-10-25 DIAGNOSIS — I50.42 CHRONIC COMBINED SYSTOLIC AND DIASTOLIC HEART FAILURE: Chronic | ICD-10-CM

## 2017-10-25 DIAGNOSIS — E78.5 HYPERLIPIDEMIA ASSOCIATED WITH TYPE 2 DIABETES MELLITUS: ICD-10-CM

## 2017-10-25 DIAGNOSIS — I50.42 CHRONIC COMBINED SYSTOLIC AND DIASTOLIC CONGESTIVE HEART FAILURE: Primary | ICD-10-CM

## 2017-10-25 DIAGNOSIS — N18.30 TYPE 2 DIABETES MELLITUS WITH STAGE 3 CHRONIC KIDNEY DISEASE, WITH LONG-TERM CURRENT USE OF INSULIN: ICD-10-CM

## 2017-10-25 DIAGNOSIS — I50.40: ICD-10-CM

## 2017-10-25 DIAGNOSIS — E11.22 TYPE 2 DIABETES MELLITUS WITH STAGE 3 CHRONIC KIDNEY DISEASE, WITH LONG-TERM CURRENT USE OF INSULIN: ICD-10-CM

## 2017-10-25 DIAGNOSIS — J96.11 CHRONIC RESPIRATORY FAILURE WITH HYPOXIA: Primary | ICD-10-CM

## 2017-10-25 DIAGNOSIS — I27.20 PULMONARY HTN: ICD-10-CM

## 2017-10-25 DIAGNOSIS — E11.69 HYPERLIPIDEMIA ASSOCIATED WITH TYPE 2 DIABETES MELLITUS: ICD-10-CM

## 2017-10-25 DIAGNOSIS — Z79.4 TYPE 2 DIABETES MELLITUS WITH STAGE 3 CHRONIC KIDNEY DISEASE, WITH LONG-TERM CURRENT USE OF INSULIN: ICD-10-CM

## 2017-10-25 DIAGNOSIS — G47.31 COMPLEX SLEEP APNEA SYNDROME: ICD-10-CM

## 2017-10-25 DIAGNOSIS — I25.5 ISCHEMIC CARDIOMYOPATHY: ICD-10-CM

## 2017-10-25 DIAGNOSIS — I15.2 HYPERTENSION ASSOCIATED WITH DIABETES: ICD-10-CM

## 2017-10-25 DIAGNOSIS — E11.59 HYPERTENSION ASSOCIATED WITH DIABETES: ICD-10-CM

## 2017-10-25 LAB
ANION GAP SERPL CALC-SCNC: 8 MMOL/L
BNP SERPL-MCNC: 318 PG/ML
BUN SERPL-MCNC: 56 MG/DL
CALCIUM SERPL-MCNC: 9.3 MG/DL
CHLORIDE SERPL-SCNC: 104 MMOL/L
CO2 SERPL-SCNC: 28 MMOL/L
CREAT SERPL-MCNC: 2.6 MG/DL
EST. GFR  (AFRICAN AMERICAN): 29 ML/MIN/1.73 M^2
EST. GFR  (NON AFRICAN AMERICAN): 25 ML/MIN/1.73 M^2
ESTIMATED AVG GLUCOSE: 137 MG/DL
GLUCOSE SERPL-MCNC: 138 MG/DL
HBA1C MFR BLD HPLC: 6.4 %
POTASSIUM SERPL-SCNC: 4.1 MMOL/L
SODIUM SERPL-SCNC: 140 MMOL/L

## 2017-10-25 PROCEDURE — 80048 BASIC METABOLIC PNL TOTAL CA: CPT | Mod: PO

## 2017-10-25 PROCEDURE — 36415 COLL VENOUS BLD VENIPUNCTURE: CPT | Mod: PO

## 2017-10-25 PROCEDURE — 99214 OFFICE O/P EST MOD 30 MIN: CPT | Mod: S$GLB,,, | Performed by: NURSE PRACTITIONER

## 2017-10-25 PROCEDURE — 83036 HEMOGLOBIN GLYCOSYLATED A1C: CPT

## 2017-10-25 PROCEDURE — 83880 ASSAY OF NATRIURETIC PEPTIDE: CPT

## 2017-10-25 PROCEDURE — 99999 PR PBB SHADOW E&M-EST. PATIENT-LVL V: CPT | Mod: PBBFAC,,, | Performed by: NURSE PRACTITIONER

## 2017-10-25 NOTE — Clinical Note
We are going to discontinue his PAP and do oxygen at night only. He has O2 bled in so all he needs is the nasal cannula to sleep with. Please deliver and he can return PAP. thx

## 2017-10-25 NOTE — TELEPHONE ENCOUNTER
Creatine has jumped from 2.0 to 2.6. Can we please do CHF symptom check and diuretic clarification

## 2017-10-25 NOTE — PROGRESS NOTES
"Subjective:      Patient ID: Yan Choudhury is a 62 y.o. male.    Chief Complaint: Sleep Apnea    Patient presents to the office today with systolic and diastolic heart failure, central sleep apnea with Cheyne long with severe hypoxemia.  He is on CPAP 5 cm water pressure with oxygen bled in.  CPAP appeared to be ineffective so BiPAP titration was performed.  BiPAP titration is also ineffective.  Patient does not feel benefit with Pap therapy.  History of decompensated CHF.  EF 30%.  Secondary pulmonary hypertension.  Patient Active Problem List:     Hypertension associated with diabetes     Stage 3 chronic kidney disease     ED (erectile dysfunction)     Ischemic cardiomyopathy     Pulmonary HTN     Hyperlipidemia associated with type 2 diabetes mellitus     Loss of eye - Right Eye     LBBB (left bundle branch block)     Abnormal stress test     Cysts of eyelids     CAD, multiple vessel     Primary open-angle glaucoma, moderate stage     CHF NYHA class II, unspecified failure chronicity, combined     Central sleep apnea due to Cheyne-Long respiration     CHF NYHA class II     Biventricular ICD (implantable cardioverter-defibrillator) in place     Anemia     Colostomy in place     Type 2 diabetes mellitus with stage 3 chronic kidney disease, with long-term current use of insulin     JAHAIRA on CPAP     Gastroesophageal reflux disease     Acute on chronic combined systolic and diastolic congestive heart failure     Chronic gout without tophus     Severe obesity (BMI 35.0-39.9) with comorbidity            /70   Pulse 74   Resp 17   Ht 5' 8" (1.727 m)   Wt 102 kg (224 lb 13.9 oz)   SpO2 99%   BMI 34.19 kg/m²   Body mass index is 34.19 kg/m².    Review of Systems   Constitutional: Negative.    HENT: Negative.    Cardiovascular: Negative.    Gastrointestinal: Negative.    Neurological: Negative.      Objective:      Physical Exam   Constitutional: He is oriented to person, place, and time. He appears " "well-developed and well-nourished.   Neck: Normal range of motion.   Cardiovascular: Normal rate and regular rhythm.    Pulmonary/Chest: Effort normal and breath sounds normal.   Musculoskeletal: He exhibits no edema.   Neurological: He is alert and oriented to person, place, and time.   Skin: Skin is warm and dry.     Personal Diagnostic Review  CPAP download with AHI 28 events per hour        Assessment:       1. Chronic respiratory failure with hypoxia    2. Complex sleep apnea syndrome    3. CHF NYHA class II, unspecified failure chronicity, combined    4. Ischemic cardiomyopathy    5. Pulmonary HTN        Outpatient Encounter Prescriptions as of 10/25/2017   Medication Sig Dispense Refill    allopurinol (ZYLOPRIM) 100 MG tablet TAKE 1 TABLET (100 MG TOTAL) BY MOUTH ONCE DAILY. 90 tablet 0    aspirin (ECOTRIN) 81 MG EC tablet Take 81 mg by mouth once daily.      BD INSULIN SYRINGE ULTRA-FINE 0.5 mL 31 gauge x 5/16 Syrg USE AS DIRECTED TO INJECT INSULIN TWO TIMES DAILY 60 each 11    blood sugar diagnostic Strp 1 strip by Misc.(Non-Drug; Combo Route) route 4 (four) times daily. For Free Style Meter 120 strip 11    bumetanide (BUMEX) 2 MG tablet Take 1 tablet (2 mg total) by mouth once daily. (Patient taking differently: Take 2 mg by mouth 2 (two) times daily. ) 90 tablet 3    carvedilol (COREG) 25 MG tablet Take 0.5 tablets (12.5 mg total) by mouth 2 (two) times daily with meals. Takes one-half tablet by mouth twice a day 30 tablet 11    guaifenesin (MUCINEX) 600 mg 12 hr tablet Take 1,200 mg by mouth 2 (two) times daily as needed.       insulin glargine (LANTUS) 100 unit/mL injection 50 units bid 30 mL 11    insulin needles, disposable, (BD ULTRA-FINE JOSLYN PEN NEEDLES) 32 x 5/32 " Ndle 1 each by Misc.(Non-Drug; Combo Route) route 4 (four) times daily. 100 each 11    IRON/VITAMIN B COMPLEX (GERITOL ORAL) Take by mouth.      lancets Misc 1 lancet by Misc.(Non-Drug; Combo Route) route 4 (four) times " daily. For Free Style Meter 120 each 11    lisinopril (PRINIVIL,ZESTRIL) 5 MG tablet Take 1 tablet (5 mg total) by mouth once daily. 90 tablet 3    pantoprazole (PROTONIX) 40 MG tablet TAKE 1 TABLET BY MOUTH EVERY DAY FOR ACID REFLUX 30 tablet 4    potassium chloride SA (K-DUR,KLOR-CON) 10 MEQ tablet TAKE 1 TABLET BY MOUTH EVERY DAY 90 tablet 2    predniSONE (DELTASONE) 20 MG tablet Take one tablet twice daily for 3 days, then take one tablet daily for 2 days, then take 1/2 tablet daily for 2 days. 10 tablet 0    simvastatin (ZOCOR) 10 MG tablet Take 1 tablet (10 mg total) by mouth every evening. 30 tablet 11     No facility-administered encounter medications on file as of 10/25/2017.      Orders Placed This Encounter   Procedures    Pulse oximetry overnight     Order Specific Question:   Reason for Test?     Answer:   Other     Comments:   O2 therapy     Order Specific Question:   Symptoms:     Answer:   Other     Order Specific Question:   Oxygen Settings:     Answer:   2     Order Specific Question:   BiPAP Settings:     Answer:   off     Order Specific Question:   CPAP Settings:     Answer:   off     Order Specific Question:   Room Air:     Answer:   No     Comments:   do on O2.      Plan:      CPAP and BiPAP pressures ineffective.  Discontinue Pap therapy.  Oxygen 2 L with sleep.  Follow-up in 3 months with an overnight saturation study to review for correction of nocturnal hypoxia.

## 2017-10-25 NOTE — TELEPHONE ENCOUNTER
Have him cut the Bumex to once daily unless he begins to have CHF symptoms. This is in order to preserve kidney function

## 2017-10-25 NOTE — TELEPHONE ENCOUNTER
Spoke with pt and pt wife notified pt information. Pt states he isn't experiencing any CHF symptoms denies any sob,edema. Pt weight today 224lbs.  Pt states he saw Elizabeth Lejeune today states when examined pt didn't have any symptoms going on. Pt states he is currently taking Bumex 2mg twice daily.

## 2017-10-26 ENCOUNTER — TELEPHONE (OUTPATIENT)
Dept: INTERNAL MEDICINE | Facility: CLINIC | Age: 62
End: 2017-10-26

## 2017-10-26 DIAGNOSIS — N18.30 CKD (CHRONIC KIDNEY DISEASE), STAGE III: Primary | ICD-10-CM

## 2017-10-26 DIAGNOSIS — N18.4 STAGE 4 CHRONIC KIDNEY DISEASE: ICD-10-CM

## 2017-10-26 NOTE — TELEPHONE ENCOUNTER
Spoke to pt. Informed pt of results , recommendations and referral. Pt is scheduled. Pt verbalized understanding

## 2017-10-26 NOTE — TELEPHONE ENCOUNTER
----- Message from Christiana Barnes sent at 10/26/2017 12:06 PM CDT -----  returned call..[phone] (home)

## 2017-10-26 NOTE — TELEPHONE ENCOUNTER
Pt was busy and wasn't able to come to the phone, left message to callback office for lab results. SHAKA

## 2017-10-31 ENCOUNTER — TELEPHONE (OUTPATIENT)
Dept: CARDIOLOGY | Facility: CLINIC | Age: 62
End: 2017-10-31

## 2017-10-31 ENCOUNTER — TELEPHONE (OUTPATIENT)
Dept: INTERNAL MEDICINE | Facility: CLINIC | Age: 62
End: 2017-10-31

## 2017-10-31 NOTE — TELEPHONE ENCOUNTER
----- Message from Michael Nascimento sent at 10/31/2017  3:44 PM CDT -----  Contact: Pt-wife Elsie    Pt -wife Elsie called in regards to pt appointment callback has questions..[phone] (home)

## 2017-10-31 NOTE — TELEPHONE ENCOUNTER
----- Message from Michael Nascimento sent at 10/31/2017  3:40 PM CDT -----  Contact: Pt-wife Jessica   Pt-wife Jessica Choudhury called wanted to know if patient still needed to come on 11-2-2017 pt already spoke with nurse and went over lab results callback number [phone]

## 2017-11-02 ENCOUNTER — OFFICE VISIT (OUTPATIENT)
Dept: CARDIOLOGY | Facility: CLINIC | Age: 62
End: 2017-11-02
Payer: COMMERCIAL

## 2017-11-02 ENCOUNTER — IMMUNIZATION (OUTPATIENT)
Dept: INTERNAL MEDICINE | Facility: CLINIC | Age: 62
End: 2017-11-02
Payer: COMMERCIAL

## 2017-11-02 ENCOUNTER — LAB VISIT (OUTPATIENT)
Dept: LAB | Facility: HOSPITAL | Age: 62
End: 2017-11-02
Attending: NURSE PRACTITIONER
Payer: COMMERCIAL

## 2017-11-02 ENCOUNTER — TELEPHONE (OUTPATIENT)
Dept: CARDIOLOGY | Facility: CLINIC | Age: 62
End: 2017-11-02

## 2017-11-02 ENCOUNTER — OFFICE VISIT (OUTPATIENT)
Dept: INTERNAL MEDICINE | Facility: CLINIC | Age: 62
End: 2017-11-02
Payer: COMMERCIAL

## 2017-11-02 VITALS
DIASTOLIC BLOOD PRESSURE: 81 MMHG | OXYGEN SATURATION: 98 % | SYSTOLIC BLOOD PRESSURE: 131 MMHG | HEART RATE: 76 BPM | WEIGHT: 222 LBS | BODY MASS INDEX: 33.65 KG/M2 | TEMPERATURE: 98 F | HEIGHT: 68 IN

## 2017-11-02 VITALS
HEIGHT: 68 IN | WEIGHT: 222 LBS | HEART RATE: 72 BPM | BODY MASS INDEX: 33.65 KG/M2 | DIASTOLIC BLOOD PRESSURE: 70 MMHG | SYSTOLIC BLOOD PRESSURE: 124 MMHG

## 2017-11-02 DIAGNOSIS — E11.59 HYPERTENSION ASSOCIATED WITH DIABETES: ICD-10-CM

## 2017-11-02 DIAGNOSIS — I25.5 ISCHEMIC CARDIOMYOPATHY: Primary | ICD-10-CM

## 2017-11-02 DIAGNOSIS — E11.22 TYPE 2 DIABETES MELLITUS WITH STAGE 4 CHRONIC KIDNEY DISEASE, WITH LONG-TERM CURRENT USE OF INSULIN: ICD-10-CM

## 2017-11-02 DIAGNOSIS — M1A.9XX0 CHRONIC GOUT WITHOUT TOPHUS, UNSPECIFIED CAUSE, UNSPECIFIED SITE: ICD-10-CM

## 2017-11-02 DIAGNOSIS — E11.69 HYPERLIPIDEMIA ASSOCIATED WITH TYPE 2 DIABETES MELLITUS: ICD-10-CM

## 2017-11-02 DIAGNOSIS — I25.10 CAD, MULTIPLE VESSEL: Chronic | ICD-10-CM

## 2017-11-02 DIAGNOSIS — E78.5 HYPERLIPIDEMIA ASSOCIATED WITH TYPE 2 DIABETES MELLITUS: ICD-10-CM

## 2017-11-02 DIAGNOSIS — E66.9 OBESITY (BMI 30.0-34.9): ICD-10-CM

## 2017-11-02 DIAGNOSIS — G47.33 OSA ON CPAP: Chronic | ICD-10-CM

## 2017-11-02 DIAGNOSIS — I27.20 PULMONARY HTN: ICD-10-CM

## 2017-11-02 DIAGNOSIS — Z95.810 BIVENTRICULAR ICD (IMPLANTABLE CARDIOVERTER-DEFIBRILLATOR) IN PLACE: Chronic | ICD-10-CM

## 2017-11-02 DIAGNOSIS — E11.22 TYPE 2 DIABETES MELLITUS WITH STAGE 3 CHRONIC KIDNEY DISEASE, WITH LONG-TERM CURRENT USE OF INSULIN: ICD-10-CM

## 2017-11-02 DIAGNOSIS — K21.9 GASTROESOPHAGEAL REFLUX DISEASE, ESOPHAGITIS PRESENCE NOT SPECIFIED: ICD-10-CM

## 2017-11-02 DIAGNOSIS — N18.30 STAGE 3 CHRONIC KIDNEY DISEASE: ICD-10-CM

## 2017-11-02 DIAGNOSIS — I50.40: Chronic | ICD-10-CM

## 2017-11-02 DIAGNOSIS — Z79.4 TYPE 2 DIABETES MELLITUS WITH STAGE 3 CHRONIC KIDNEY DISEASE, WITH LONG-TERM CURRENT USE OF INSULIN: ICD-10-CM

## 2017-11-02 DIAGNOSIS — Z93.3 COLOSTOMY IN PLACE: ICD-10-CM

## 2017-11-02 DIAGNOSIS — I15.2 HYPERTENSION ASSOCIATED WITH DIABETES: ICD-10-CM

## 2017-11-02 DIAGNOSIS — N18.30 TYPE 2 DIABETES MELLITUS WITH STAGE 3 CHRONIC KIDNEY DISEASE, WITH LONG-TERM CURRENT USE OF INSULIN: ICD-10-CM

## 2017-11-02 DIAGNOSIS — Z00.00 ROUTINE GENERAL MEDICAL EXAMINATION AT A HEALTH CARE FACILITY: Primary | ICD-10-CM

## 2017-11-02 DIAGNOSIS — I44.7 LBBB (LEFT BUNDLE BRANCH BLOCK): ICD-10-CM

## 2017-11-02 DIAGNOSIS — N18.4 TYPE 2 DIABETES MELLITUS WITH STAGE 4 CHRONIC KIDNEY DISEASE, WITH LONG-TERM CURRENT USE OF INSULIN: ICD-10-CM

## 2017-11-02 DIAGNOSIS — Z12.5 PROSTATE CANCER SCREENING: ICD-10-CM

## 2017-11-02 DIAGNOSIS — Z79.4 TYPE 2 DIABETES MELLITUS WITH STAGE 4 CHRONIC KIDNEY DISEASE, WITH LONG-TERM CURRENT USE OF INSULIN: ICD-10-CM

## 2017-11-02 DIAGNOSIS — I25.5 ISCHEMIC CARDIOMYOPATHY: ICD-10-CM

## 2017-11-02 PROBLEM — E66.01 SEVERE OBESITY (BMI 35.0-39.9) WITH COMORBIDITY: Status: RESOLVED | Noted: 2017-05-02 | Resolved: 2017-11-02

## 2017-11-02 PROBLEM — I50.43 ACUTE ON CHRONIC COMBINED SYSTOLIC AND DIASTOLIC CONGESTIVE HEART FAILURE: Status: RESOLVED | Noted: 2017-04-19 | Resolved: 2017-11-02

## 2017-11-02 LAB
ANION GAP SERPL CALC-SCNC: 8 MMOL/L
BNP SERPL-MCNC: 764 PG/ML
BUN SERPL-MCNC: 31 MG/DL
CALCIUM SERPL-MCNC: 9.5 MG/DL
CHLORIDE SERPL-SCNC: 106 MMOL/L
CO2 SERPL-SCNC: 27 MMOL/L
CREAT SERPL-MCNC: 2.1 MG/DL
EST. GFR  (AFRICAN AMERICAN): 37 ML/MIN/1.73 M^2
EST. GFR  (NON AFRICAN AMERICAN): 32 ML/MIN/1.73 M^2
GLUCOSE SERPL-MCNC: 112 MG/DL
POTASSIUM SERPL-SCNC: 4.3 MMOL/L
SODIUM SERPL-SCNC: 141 MMOL/L

## 2017-11-02 PROCEDURE — 99214 OFFICE O/P EST MOD 30 MIN: CPT | Mod: S$GLB,,, | Performed by: NURSE PRACTITIONER

## 2017-11-02 PROCEDURE — 36415 COLL VENOUS BLD VENIPUNCTURE: CPT

## 2017-11-02 PROCEDURE — 90686 IIV4 VACC NO PRSV 0.5 ML IM: CPT | Mod: S$GLB,,, | Performed by: FAMILY MEDICINE

## 2017-11-02 PROCEDURE — 99999 PR PBB SHADOW E&M-EST. PATIENT-LVL III: CPT | Mod: PBBFAC,,, | Performed by: NURSE PRACTITIONER

## 2017-11-02 PROCEDURE — 99396 PREV VISIT EST AGE 40-64: CPT | Mod: 25,S$GLB,, | Performed by: FAMILY MEDICINE

## 2017-11-02 PROCEDURE — 80048 BASIC METABOLIC PNL TOTAL CA: CPT

## 2017-11-02 PROCEDURE — 83880 ASSAY OF NATRIURETIC PEPTIDE: CPT

## 2017-11-02 PROCEDURE — 99999 PR PBB SHADOW E&M-EST. PATIENT-LVL III: CPT | Mod: PBBFAC,,, | Performed by: FAMILY MEDICINE

## 2017-11-02 PROCEDURE — 90471 IMMUNIZATION ADMIN: CPT | Mod: S$GLB,,, | Performed by: FAMILY MEDICINE

## 2017-11-02 NOTE — PROGRESS NOTES
Subjective:       Patient ID: Yan Choudhury is a 62 y.o. male.    Chief Complaint: Follow-up    62-year-old -American male patient accompanied by his wife with Patient Active Problem List:     Hypertension associated with diabetes     Stage 3 chronic kidney disease     ED (erectile dysfunction)     Ischemic cardiomyopathy     Pulmonary HTN     Hyperlipidemia associated with type 2 diabetes mellitus     Loss of eye - Right Eye     LBBB (left bundle branch block)     Abnormal stress test     Cysts of eyelids     CAD, multiple vessel     Primary open-angle glaucoma, moderate stage     CHF NYHA class II, unspecified failure chronicity, combined     Biventricular ICD (implantable cardioverter-defibrillator) in place     Anemia     Colostomy in place     Type 2 diabetes mellitus with stage 3 chronic kidney disease, with long-term current use of insulin     JAHAIRA on CPAP     Gastroesophageal reflux disease     Chronic gout without tophus  Here for routine annual physicals and for follow-up on recent labs.  Reports that he drinks 3 glasses of water daily, has cut down Bumex to once daily, prior was taking twice daily but secondary to worsening kidney functions was advised to take it once daily.  Patient reports that he has been weighing himself regularly, and has been stable.   Patient has appointment with cardiology today, and nephrology tomorrow.  Has been taking his insulin regularly and blood glucose levels has been stable.  Reports that he is currently on CPAP for sleep apnea but will be changed to BiPAP soon, has been using oxygen at bedtime.   Denies of any chest pain or difficulty breathing, denies of any gout-like exacerbations, palpitations.             Review of Systems   Constitutional: Negative for appetite change and fatigue.   Eyes: Negative for visual disturbance.   Respiratory: Negative for shortness of breath.    Cardiovascular: Negative for chest pain, palpitations and leg swelling.  "  Gastrointestinal: Negative for abdominal pain, nausea and vomiting.   Endocrine: Negative for polydipsia, polyphagia and polyuria.   Musculoskeletal: Negative for myalgias.   Skin: Negative for rash.   Neurological: Negative for weakness, numbness and headaches.   Psychiatric/Behavioral: Negative for sleep disturbance.         /81 (BP Location: Right arm, Patient Position: Sitting)   Pulse 76   Temp 97.6 °F (36.4 °C) (Oral)   Ht 5' 8" (1.727 m)   Wt 100.7 kg (222 lb 0.1 oz)   SpO2 98%   BMI 33.76 kg/m²   Objective:      Physical Exam   Constitutional: He is oriented to person, place, and time. He appears well-developed and well-nourished.   HENT:   Head: Normocephalic and atraumatic.   Mouth/Throat: Oropharynx is clear and moist.   Cardiovascular: Normal rate, regular rhythm and normal heart sounds.    No murmur heard.  Pulmonary/Chest: Effort normal and breath sounds normal. He has no wheezes.   Abdominal: Soft. Bowel sounds are normal. There is no tenderness.   Musculoskeletal: He exhibits no edema.   Neurological: He is alert and oriented to person, place, and time.   Skin: Skin is warm and dry. No rash noted.   Psychiatric: He has a normal mood and affect.       Lab Visit on 10/25/2017   Component Date Value Ref Range Status    BNP 10/25/2017 318* 0 - 99 pg/mL Final    Sodium 10/25/2017 140  136 - 145 mmol/L Final    Potassium 10/25/2017 4.1  3.5 - 5.1 mmol/L Final    Chloride 10/25/2017 104  95 - 110 mmol/L Final    CO2 10/25/2017 28  23 - 29 mmol/L Final    Glucose 10/25/2017 138* 70 - 110 mg/dL Final    BUN, Bld 10/25/2017 56* 8 - 23 mg/dL Final    Creatinine 10/25/2017 2.6* 0.5 - 1.4 mg/dL Final    Calcium 10/25/2017 9.3  8.7 - 10.5 mg/dL Final    Anion Gap 10/25/2017 8  8 - 16 mmol/L Final    eGFR if  10/25/2017 29* >60 mL/min/1.73 m^2 Final    eGFR if non African American 10/25/2017 25* >60 mL/min/1.73 m^2 Final    Comment: Calculation used to obtain the estimated " glomerular filtration  rate (eGFR) is the CKD-EPI equation. Since race is unknown   in our information system, the eGFR values for   -American and Non--American patients are given   for each creatinine result.      Hemoglobin A1C 10/25/2017 6.4* 4.0 - 5.6 % Final    Comment: According to ADA guidelines, hemoglobin A1c <7.0% represents  optimal control in non-pregnant diabetic patients. Different  metrics may apply to specific patient populations.   Standards of Medical Care in Diabetes-2016.  For the purpose of screening for the presence of diabetes:  <5.7%     Consistent with the absence of diabetes  5.7-6.4%  Consistent with increasing risk for diabetes   (prediabetes)  >or=6.5%  Consistent with diabetes  Currently, no consensus exists for use of hemoglobin A1c  for diagnosis of diabetes for children.  This Hemoglobin A1c assay has significant interference with fetal   hemoglobin   (HbF). The results are invalid for patients with abnormal amounts of   HbF,   including those with known Hereditary Persistence   of Fetal Hemoglobin. Heterozygous hemoglobin variants (HbAS, HbAC,   HbAD, HbAE, HbA2) do not significantly interfere with this assay;   however, presence of multiple variants in a sample may impact the %   interference.      Estimated Avg Glucose 10/25/2017 137* 68 - 131 mg/dL Final       Assessment:       1. Routine general medical examination at a health care facility    2. Hypertension associated with diabetes    3. Hyperlipidemia associated with type 2 diabetes mellitus    4. Type 2 diabetes mellitus with stage 4 chronic kidney disease, with long-term current use of insulin    5. Chronic gout without tophus, unspecified cause, unspecified site    6. JAHAIRA on CPAP    7. CHF NYHA class II, unspecified failure chronicity, combined    8. CAD, multiple vessel    9. Biventricular ICD (implantable cardioverter-defibrillator) in place    10. Ischemic cardiomyopathy    11. Pulmonary HTN    12.  Colostomy in place    13. Gastroesophageal reflux disease, esophagitis presence not specified    14. Obesity (BMI 30.0-34.9)    15. Prostate cancer screening        Plan:   Routine general medical examination at a health care facility  Vital signs stable today.  Clinical exam stable.   Reviewed baseline labs showing worsening kidney functions with stable A1c.   Patient was encouraged to drink 3-4 glasses of water daily and avoid over-the-counter NSAIDs.  Follow-up with specialists  Flu shot given today      Hypertension associated with diabetes-blood pressure stable today currently taking lisinopril 5 mg daily    Hyperlipidemia associated with type 2 diabetes mellitus  -     Lipid panel; Future; Expected date: 11/03/2017  Currently on simvastatin 10 mg daily    Type 2 diabetes mellitus with stage 4 chronic kidney disease, with long-term current use of insulin-stable A1c on insulin Lantus 50 units twice daily  Encouraged to drink adequate fluids    Chronic gout without tophus, unspecified cause, unspecified site-currently on allopurinol 100 mg daily, clinically stable and asymptomatic, to be followed by nephrology for chronic kidney disease and chronic gout    JAHAIRA on CPAP-stable on CPAP machine and oxygen    CHF NYHA class II, unspecified failure chronicity, combined  CAD, multiple vessel  Biventricular ICD (implantable cardioverter-defibrillator) in place  Ischemic cardiomyopathy  Pulmonary HTN  Currently taking aspirin and carvedilol 12.5 mg twice daily and Bumex 2 mg daily to be followed by cardiology.     Colostomy in place    Gastroesophageal reflux disease, esophagitis presence not specified-stable on pantoprazole 40 mg daily     Obesity (BMI 30.0-34.9)-lifestyle modifications recommended with diet and exercise to lose weight with BMI 33     Prostate cancer screening  -     PSA, Screening; Future; Expected date: 11/02/2017

## 2017-11-02 NOTE — PROGRESS NOTES
"Subjective:   Patient ID:  Yan Choudhury is a 62 y.o. male who presents for follow up of Congestive Heart Failure      HPI    Patient presents to clinic for follow up and management of ischemic cardiomyopathy. Patient a little SOB today in visit. Has gained 12 lbs since last visit. His Bumex was  decreased to 2mg once daily on 10/25/17 due to bump in creatine from 2.0 to 2.6. His BP is stable. Had issues with hypotension with attempts at optimizing meds for cardiomyopathy.  Has no chest pain, edema, orthopnea, no PND, dizziness, near syncope or syncope. Has chronic cough. No angina or equivalent. Uses CPAP nightly. Doing ok with low sodium diet. No ICD firing Following with Nephrology.     Past Medical History:   Diagnosis Date    Arthritis     CAD (coronary artery disease)     Cataract     CHF (congestive heart failure)     Dr Calvillo    Chronic combined systolic and diastolic congestive heart failure 3/24/2015    CKD (chronic kidney disease), stage III     Diabetes mellitus type II      AM    Diabetic retinopathy     anti Veg F injections for macular edema    Diverticulitis of colon     ED (erectile dysfunction)     Glaucoma     HTN (hypertension)     Hyperlipidemia     Ischemic cardiomyopathy     Obesity     Pulmonary HTN        Past Surgical History:   Procedure Laterality Date    CARDIAC CATHETERIZATION  2009    CHOLECYSTECTOMY      COLONOSCOPY      EYE SURGERY      KNEE SURGERY         Social History   Substance Use Topics    Smoking status: Never Smoker    Smokeless tobacco: Never Used    Alcohol use 0.0 oz/week      Comment: " once in awhile...special occassions"       Family History   Problem Relation Age of Onset    Cancer Mother     Heart disease Father     Stroke Father     No Known Problems Sister     No Known Problems Brother     No Known Problems Maternal Aunt     No Known Problems Maternal Uncle     No Known Problems Paternal Aunt     No Known Problems Paternal " "Uncle     No Known Problems Maternal Grandmother     No Known Problems Maternal Grandfather     No Known Problems Paternal Grandmother     No Known Problems Paternal Grandfather     Amblyopia Neg Hx     Blindness Neg Hx     Cataracts Neg Hx     Diabetes Neg Hx     Glaucoma Neg Hx     Hypertension Neg Hx     Macular degeneration Neg Hx     Retinal detachment Neg Hx     Strabismus Neg Hx     Thyroid disease Neg Hx        Current Outpatient Prescriptions   Medication Sig    allopurinol (ZYLOPRIM) 100 MG tablet TAKE 1 TABLET (100 MG TOTAL) BY MOUTH ONCE DAILY.    aspirin (ECOTRIN) 81 MG EC tablet Take 81 mg by mouth once daily.    BD INSULIN SYRINGE ULTRA-FINE 0.5 mL 31 gauge x 5/16 Syrg USE AS DIRECTED TO INJECT INSULIN TWO TIMES DAILY    blood sugar diagnostic Strp 1 strip by Misc.(Non-Drug; Combo Route) route 4 (four) times daily. For Free Style Meter    bumetanide (BUMEX) 2 MG tablet Take 1 tablet (2 mg total) by mouth once daily. (Patient taking differently: Take 2 mg by mouth 2 (two) times daily. )    carvedilol (COREG) 25 MG tablet Take 0.5 tablets (12.5 mg total) by mouth 2 (two) times daily with meals. Takes one-half tablet by mouth twice a day    guaifenesin (MUCINEX) 600 mg 12 hr tablet Take 1,200 mg by mouth 2 (two) times daily as needed.     insulin glargine (LANTUS) 100 unit/mL injection 50 units bid    insulin needles, disposable, (BD ULTRA-FINE JOSLYN PEN NEEDLES) 32 x 5/32 " Ndle 1 each by Misc.(Non-Drug; Combo Route) route 4 (four) times daily.    IRON/VITAMIN B COMPLEX (GERITOL ORAL) Take by mouth.    lancets Misc 1 lancet by Misc.(Non-Drug; Combo Route) route 4 (four) times daily. For Free Style Meter    lisinopril (PRINIVIL,ZESTRIL) 5 MG tablet Take 1 tablet (5 mg total) by mouth once daily.    pantoprazole (PROTONIX) 40 MG tablet TAKE 1 TABLET BY MOUTH EVERY DAY FOR ACID REFLUX    potassium chloride SA (K-DUR,KLOR-CON) 10 MEQ tablet TAKE 1 TABLET BY MOUTH EVERY DAY    " "simvastatin (ZOCOR) 10 MG tablet Take 1 tablet (10 mg total) by mouth every evening.     No current facility-administered medications for this visit.      Current Outpatient Prescriptions on File Prior to Visit   Medication Sig    allopurinol (ZYLOPRIM) 100 MG tablet TAKE 1 TABLET (100 MG TOTAL) BY MOUTH ONCE DAILY.    aspirin (ECOTRIN) 81 MG EC tablet Take 81 mg by mouth once daily.    BD INSULIN SYRINGE ULTRA-FINE 0.5 mL 31 gauge x 5/16 Syrg USE AS DIRECTED TO INJECT INSULIN TWO TIMES DAILY    blood sugar diagnostic Strp 1 strip by Misc.(Non-Drug; Combo Route) route 4 (four) times daily. For Free Style Meter    bumetanide (BUMEX) 2 MG tablet Take 1 tablet (2 mg total) by mouth once daily. (Patient taking differently: Take 2 mg by mouth 2 (two) times daily. )    carvedilol (COREG) 25 MG tablet Take 0.5 tablets (12.5 mg total) by mouth 2 (two) times daily with meals. Takes one-half tablet by mouth twice a day    guaifenesin (MUCINEX) 600 mg 12 hr tablet Take 1,200 mg by mouth 2 (two) times daily as needed.     insulin glargine (LANTUS) 100 unit/mL injection 50 units bid    insulin needles, disposable, (BD ULTRA-FINE JOSLYN PEN NEEDLES) 32 x 5/32 " Ndle 1 each by Misc.(Non-Drug; Combo Route) route 4 (four) times daily.    IRON/VITAMIN B COMPLEX (GERITOL ORAL) Take by mouth.    lancets Misc 1 lancet by Misc.(Non-Drug; Combo Route) route 4 (four) times daily. For Free Style Meter    lisinopril (PRINIVIL,ZESTRIL) 5 MG tablet Take 1 tablet (5 mg total) by mouth once daily.    pantoprazole (PROTONIX) 40 MG tablet TAKE 1 TABLET BY MOUTH EVERY DAY FOR ACID REFLUX    potassium chloride SA (K-DUR,KLOR-CON) 10 MEQ tablet TAKE 1 TABLET BY MOUTH EVERY DAY    simvastatin (ZOCOR) 10 MG tablet Take 1 tablet (10 mg total) by mouth every evening.    [DISCONTINUED] predniSONE (DELTASONE) 20 MG tablet Take one tablet twice daily for 3 days, then take one tablet daily for 2 days, then take 1/2 tablet daily for 2 days. "     No current facility-administered medications on file prior to visit.        Review of Systems   Constitution: Positive for weight gain (12 lbs). Negative for decreased appetite, fever, weakness, malaise/fatigue and weight loss.   HENT: Negative for congestion and nosebleeds.    Cardiovascular: Negative for chest pain, claudication, dyspnea on exertion, irregular heartbeat, leg swelling, near-syncope, orthopnea, palpitations, paroxysmal nocturnal dyspnea and syncope.   Respiratory: Positive for shortness of breath. Negative for cough, sleep disturbances due to breathing, snoring and wheezing.         Uses CPAP nightly      Hematologic/Lymphatic: Negative for bleeding problem. Does not bruise/bleed easily.   Skin: Negative for rash.   Musculoskeletal: Negative for arthritis, back pain, falls, joint pain, joint swelling, muscle cramps and muscle weakness.   Gastrointestinal: Negative for bloating, abdominal pain, constipation, diarrhea, heartburn, nausea and vomiting.   Genitourinary: Negative for dysuria, frequency, hematuria and nocturia.   Neurological: Negative for excessive daytime sleepiness, dizziness, headaches, light-headedness, loss of balance, numbness and paresthesias.       Objective:   Physical Exam   Constitutional: He is oriented to person, place, and time. He appears well-developed and well-nourished.   Neck: Neck supple. No JVD present.   Cardiovascular: Normal rate, regular rhythm, normal heart sounds and normal pulses.  Exam reveals no friction rub.    No murmur heard.  Pulmonary/Chest: Effort normal. No respiratory distress. He has no wheezes. He has rales in the right lower field and the left lower field.   Abdominal: Soft. Bowel sounds are normal. He exhibits no distension.   Colostomy      Musculoskeletal: He exhibits no edema or tenderness.   Neurological: He is alert and oriented to person, place, and time.   Skin: Skin is warm and dry. No rash noted.   Psychiatric: He has a normal mood  "and affect. His behavior is normal.   Nursing note and vitals reviewed.    Vitals:    11/02/17 1419   BP: 124/70   BP Location: Right arm   Patient Position: Sitting   BP Method: Medium (Manual)   Pulse: 72   Weight: 100.7 kg (222 lb 0.1 oz)   Height: 5' 8" (1.727 m)     Lab Results   Component Value Date    CHOL 112 (L) 04/20/2017    CHOL 107 (L) 11/17/2016    CHOL 105 (L) 05/17/2016     Lab Results   Component Value Date    HDL 39 (L) 04/20/2017    HDL 31 (L) 11/17/2016    HDL 20 (L) 05/17/2016     Lab Results   Component Value Date    LDLCALC 58.6 (L) 04/20/2017    LDLCALC 58.2 (L) 11/17/2016    LDLCALC 60.0 (L) 05/17/2016     Lab Results   Component Value Date    TRIG 72 04/20/2017    TRIG 89 11/17/2016    TRIG 125 05/17/2016     Lab Results   Component Value Date    CHOLHDL 34.8 04/20/2017    CHOLHDL 29.0 11/17/2016    CHOLHDL 19.0 (L) 05/17/2016       Chemistry        Component Value Date/Time     10/25/2017 1055    K 4.1 10/25/2017 1055     10/25/2017 1055    CO2 28 10/25/2017 1055    BUN 56 (H) 10/25/2017 1055    CREATININE 2.6 (H) 10/25/2017 1055     (H) 10/25/2017 1055        Component Value Date/Time    CALCIUM 9.3 10/25/2017 1055    ALKPHOS 111 05/02/2017 1608    AST 26 05/02/2017 1608    ALT 11 05/02/2017 1608    BILITOT 1.0 05/02/2017 1608    ESTGFRAFRICA 29 (A) 10/25/2017 1055    EGFRNONAA 25 (A) 10/25/2017 1055          Lab Results   Component Value Date    TSH 4.317 (H) 05/17/2016     Lab Results   Component Value Date    INR 1.1 04/19/2017    INR 1.4 (H) 03/22/2016    INR 1.4 (H) 12/15/2015     Lab Results   Component Value Date    WBC 6.20 08/04/2017    HGB 13.3 (L) 08/04/2017    HCT 40.8 08/04/2017    MCV 89 08/04/2017     (L) 08/04/2017     BMP  Sodium   Date Value Ref Range Status   10/25/2017 140 136 - 145 mmol/L Final     Potassium   Date Value Ref Range Status   10/25/2017 4.1 3.5 - 5.1 mmol/L Final     Chloride   Date Value Ref Range Status   10/25/2017 104 95 - " 110 mmol/L Final     CO2   Date Value Ref Range Status   10/25/2017 28 23 - 29 mmol/L Final     BUN, Bld   Date Value Ref Range Status   10/25/2017 56 (H) 8 - 23 mg/dL Final     Creatinine   Date Value Ref Range Status   10/25/2017 2.6 (H) 0.5 - 1.4 mg/dL Final     Calcium   Date Value Ref Range Status   10/25/2017 9.3 8.7 - 10.5 mg/dL Final     Anion Gap   Date Value Ref Range Status   10/25/2017 8 8 - 16 mmol/L Final     eGFR if    Date Value Ref Range Status   10/25/2017 29 (A) >60 mL/min/1.73 m^2 Final     eGFR if non    Date Value Ref Range Status   10/25/2017 25 (A) >60 mL/min/1.73 m^2 Final     Comment:     Calculation used to obtain the estimated glomerular filtration  rate (eGFR) is the CKD-EPI equation. Since race is unknown   in our information system, the eGFR values for   -American and Non--American patients are given   for each creatinine result.       CrCl cannot be calculated (Patient's most recent lab result is older than the maximum 7 days allowed.).    Assessment:     1. Ischemic cardiomyopathy    2. Pulmonary HTN    3. CAD, multiple vessel    4. CHF NYHA class II, unspecified failure chronicity, combined    5. Biventricular ICD (implantable cardioverter-defibrillator) in place    6. Hypertension associated with diabetes    7. Hyperlipidemia associated with type 2 diabetes mellitus    8. LBBB (left bundle branch block)    9. Stage 3 chronic kidney disease    10. Type 2 diabetes mellitus with stage 3 chronic kidney disease, with long-term current use of insulin    11. Colostomy in place    12. Gastroesophageal reflux disease, esophagitis presence not specified    13. JAHAIRA on CPAP    Has rales on exam and weight gain since decreasing Bumex to 2mg once daily from BID  Using CPAP nightly   No angina or equivalent  No ICD firing   No CNS complaints to suggest TIA or CVA  Following with Nephrology   Doing well with low sodium diet  BP stable   Plan:   Patient  instructed to take his Bumex 2mg BID every other day.   Keep scheduled appt with Nephrology   Heart healthy diet  Daily weights  RTC in 2 months with BMP and BNP

## 2017-11-02 NOTE — TELEPHONE ENCOUNTER
BNP has bumped, but renal function is stable. Continue with recommended changes in visit today. Bumex 2mg BID every other day and once daily on the other days

## 2017-11-03 ENCOUNTER — OFFICE VISIT (OUTPATIENT)
Dept: NEPHROLOGY | Facility: CLINIC | Age: 62
End: 2017-11-03
Payer: COMMERCIAL

## 2017-11-03 VITALS
WEIGHT: 222.88 LBS | HEIGHT: 66 IN | SYSTOLIC BLOOD PRESSURE: 138 MMHG | DIASTOLIC BLOOD PRESSURE: 80 MMHG | BODY MASS INDEX: 35.82 KG/M2 | HEART RATE: 80 BPM

## 2017-11-03 DIAGNOSIS — N18.30 CKD (CHRONIC KIDNEY DISEASE) STAGE 3, GFR 30-59 ML/MIN: Primary | ICD-10-CM

## 2017-11-03 PROCEDURE — 99214 OFFICE O/P EST MOD 30 MIN: CPT | Mod: S$GLB,,, | Performed by: INTERNAL MEDICINE

## 2017-11-03 PROCEDURE — 99999 PR PBB SHADOW E&M-EST. PATIENT-LVL III: CPT | Mod: PBBFAC,,, | Performed by: INTERNAL MEDICINE

## 2017-11-03 NOTE — LETTER
November 3, 2017      Sandra Estevez MD  [address] [facility] [address]  [place] [address]           [facility] Nephrology  [address]  [place] [address]  Phone: [phone]  Fax: [phone]          Patient: Yan Choudhury   MR Number: [id number]   YOB: 1955   Date of Visit: 11/3/2017       Dear Dr. Sandra Estevez:    Thank you for referring Yan Choudhury to me for evaluation. Attached you will find relevant portions of my assessment and plan of care.    If you have questions, please do not hesitate to call me. I look forward to following Yan Choudhury along with you.    Sincerely,    Aubrey Seth MD    Enclosure  CC:  No Recipients    If you would like to receive this communication electronically, please contact [email or web address] or [phone] to request more information on [employer] access.    For providers and/or their staff who would like to refer a patient to [facility], please contact us through our one-stop-shop provider referral line, [facility], at [phone].    If you feel you have received this communication in error or would no longer like to receive these types of communications, please e-mail [email or web address]

## 2017-11-03 NOTE — PROGRESS NOTES
PROGRESS NOTE FOR ESTABLISHED PATIENT    PHYSICIAN REQUESTING THE CONSULT: Dr. Sandra Estevez    REASON FOR VISIT: Renal insufficiency    62 y.o. male with history of CKD 3, HTN, CHF, anemia, DM2, CAD, pulmonary hypertension presents to the renal clinic for evaluation of renal insufficiency.     Patient was last seen by Dr. Martinez on 10/13/16 when his creatinine was 1.7.    Patient today presents for follow-up.    Patient reports SOB with exercise and mild LE edema. Bumex has recently been decreased to 2 mg po every other day.         Past Medical History:   Diagnosis Date    Arthritis     CAD (coronary artery disease)     Cataract     CHF (congestive heart failure)     Dr Calvillo    Chronic combined systolic and diastolic congestive heart failure 3/24/2015    CKD (chronic kidney disease), stage III     Diabetes mellitus type II      AM    Diabetic retinopathy     anti Veg F injections for macular edema    Diverticulitis of colon     ED (erectile dysfunction)     Glaucoma     HTN (hypertension)     Hyperlipidemia     Ischemic cardiomyopathy     Obesity     Pulmonary HTN        Past Surgical History:   Procedure Laterality Date    CARDIAC CATHETERIZATION  2009    CHOLECYSTECTOMY      COLONOSCOPY      EYE SURGERY      KNEE SURGERY         Review of patient's allergies indicates:  No Known Allergies    Current Outpatient Prescriptions   Medication Sig Dispense Refill    allopurinol (ZYLOPRIM) 100 MG tablet TAKE 1 TABLET (100 MG TOTAL) BY MOUTH ONCE DAILY. 90 tablet 0    aspirin (ECOTRIN) 81 MG EC tablet Take 81 mg by mouth once daily.      BD INSULIN SYRINGE ULTRA-FINE 0.5 mL 31 gauge x 5/16 Syrg USE AS DIRECTED TO INJECT INSULIN TWO TIMES DAILY 60 each 11    blood sugar diagnostic Strp 1 strip by Misc.(Non-Drug; Combo Route) route 4 (four) times daily. For Free Style Meter 120 strip 11    bumetanide (BUMEX) 2 MG tablet Take 1 tablet (2 mg total) by mouth once daily. (Patient taking  "differently: Take 2 mg by mouth 2 (two) times daily. ) 90 tablet 3    carvedilol (COREG) 25 MG tablet Take 0.5 tablets (12.5 mg total) by mouth 2 (two) times daily with meals. Takes one-half tablet by mouth twice a day 30 tablet 11    guaifenesin (MUCINEX) 600 mg 12 hr tablet Take 1,200 mg by mouth 2 (two) times daily as needed.       insulin glargine (LANTUS) 100 unit/mL injection 50 units bid 30 mL 11    insulin needles, disposable, (BD ULTRA-FINE JOSLYN PEN NEEDLES) 32 x 5/32 " Ndle 1 each by Misc.(Non-Drug; Combo Route) route 4 (four) times daily. 100 each 11    IRON/VITAMIN B COMPLEX (GERITOL ORAL) Take by mouth.      lancets Misc 1 lancet by Misc.(Non-Drug; Combo Route) route 4 (four) times daily. For Free Style Meter 120 each 11    lisinopril (PRINIVIL,ZESTRIL) 5 MG tablet Take 1 tablet (5 mg total) by mouth once daily. 90 tablet 3    pantoprazole (PROTONIX) 40 MG tablet TAKE 1 TABLET BY MOUTH EVERY DAY FOR ACID REFLUX 30 tablet 4    potassium chloride SA (K-DUR,KLOR-CON) 10 MEQ tablet TAKE 1 TABLET BY MOUTH EVERY DAY 90 tablet 2    simvastatin (ZOCOR) 10 MG tablet Take 1 tablet (10 mg total) by mouth every evening. 30 tablet 11     No current facility-administered medications for this visit.        Family History   Problem Relation Age of Onset    Cancer Mother     Heart disease Father     Stroke Father     No Known Problems Sister     No Known Problems Brother     No Known Problems Maternal Aunt     No Known Problems Maternal Uncle     No Known Problems Paternal Aunt     No Known Problems Paternal Uncle     No Known Problems Maternal Grandmother     No Known Problems Maternal Grandfather     No Known Problems Paternal Grandmother     No Known Problems Paternal Grandfather     Amblyopia Neg Hx     Blindness Neg Hx     Cataracts Neg Hx     Diabetes Neg Hx     Glaucoma Neg Hx     Hypertension Neg Hx     Macular degeneration Neg Hx     Retinal detachment Neg Hx     Strabismus Neg " "Hx     Thyroid disease Neg Hx        Social History     Social History    Marital status:      Spouse name: N/A    Number of children: 2    Years of education: N/A     Occupational History    [employer]     Social History Main Topics    Smoking status: Never Smoker    Smokeless tobacco: Never Used    Alcohol use 0.0 oz/week      Comment: " once in awhile...special occassions"    Drug use: No    Sexual activity: Not Currently     Other Topics Concern    Not on file     Social History Narrative    . Lives with spouse. Has 2 children. 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       Review of Systems:  1. GENERAL: patient denies any fever, weight changes, generalized weakness, dizziness.  2. HEENT: patient denies headaches, visual disturbances, swallowing problems, sinus pain, nasal congestion.  3. CARDIOVASCULAR: patient denies chest pain, palpitations.  4. PULMONARY: patient reports SOB with exercise, no coughing, hemoptysis, wheezing.  5. GASTROINTESTINAL: patient denies abdominal pain, nausea, vomiting, diarrhea.  6. GENITOURINARY: patient denies dysuria, hematuria, hesitancy, frequency.  7. EXTREMITIES: patient reports mild LE edema, no LE cramping.  8. DERMATOLOGY: patient denies rashes, ulcers.  9. NEURO: patient denies tremors, extremity weakness, extremity numbness/tingling.  10. MUSCULOSKELETAL: patient denies joint pain, joint swelling.  11. HEMATOLOGY: patient denies rectal or gum bleeding.  12: PSYCH: patient denies anxiety, depression.      PHYSICAL EXAM:  /80   Pulse 80   Ht 5' 6" (1.676 m)   Wt 101.1 kg (222 lb 14.2 oz)   BMI 35.97 kg/m²     GENERAL: Pleasant gentleman presents to clinic with non-labored breathing.  HEENT: PER, no nasal discharge, no icterus, no oral exudates, moist mucosal membranes.  NECK: no thyroid mass, no lymphadenopathy.  HEART: RRR S1/S2, no rubs, good peripheral pulses.  LUNGS: CTA " bilaterally, no wheezing, breathing is nonlabored.  ABDOMEN: soft, nontender, not distended, bowel sounds are present, no abdominal hernia.  EXTREM: Trace LE edema.  SKIN: no rashes, skin is warm and dry.  NEURO: A & O x 3, no obvious focal signs.    LABORATORY RESULTS:    Lab Results   Component Value Date    CREATININE 2.1 (H) 11/02/2017    BUN 31 (H) 11/02/2017     11/02/2017    K 4.3 11/02/2017     11/02/2017    CO2 27 11/02/2017      Lab Results   Component Value Date    PTH 97.0 (H) 10/06/2016    CALCIUM 9.5 11/02/2017    PHOS 3.5 04/20/2017     Lab Results   Component Value Date    ALBUMIN 3.0 (L) 05/02/2017     Lab Results   Component Value Date    WBC 6.20 08/04/2017    HGB 13.3 (L) 08/04/2017    HCT 40.8 08/04/2017    MCV 89 08/04/2017     (L) 08/04/2017     Urinalysis: + 2 protein, 6 RBC (10/25/17)      ASSESSMENT AND PLAN:  62 y.o. male with history of CKD 3, HTN, CHF, anemia, DM2, CAD, pulmonary hypertension presents to the renal clinic for evaluation of renal insufficiency.     1. Renal insufficiency: Patient presents with renal insufficiency, consistent with CKD stage 3/4. Patient likely suffers from cardiorenal syndrome and creatinine has been fluctuating. His creatinine has recently declined from 2.6 (10/25/17) to 2.1 (11/2/17). Bumex dose has been decreased to 2 mg po every other day. Patient's renal function will be monitored closely and he will return to the clinic in 3 months for follow up. Patient is on fluid restrictions (about 40-50 ounces per day).     2. Electrolytes: Within normal limits.    3. Acid base status: No acute issues.    4. Volume: Mild LE edema. Continue Bumex.     5. Hypertension: Good BP control.     6. Medications: Reviewed. Agree with current medical regimen. Continue Lisinopril.    7. Mild anemia: monitor.    8. DM2: well controlled with last HgA1c at 6.4.

## 2017-11-20 ENCOUNTER — CLINICAL SUPPORT (OUTPATIENT)
Dept: CARDIOLOGY | Facility: CLINIC | Age: 62
End: 2017-11-20
Attending: INTERNAL MEDICINE
Payer: COMMERCIAL

## 2017-11-20 DIAGNOSIS — I25.5 ISCHEMIC CARDIOMYOPATHY: ICD-10-CM

## 2017-11-20 DIAGNOSIS — Z95.810 ICD (IMPLANTABLE CARDIOVERTER-DEFIBRILLATOR) IN PLACE: ICD-10-CM

## 2017-11-20 DIAGNOSIS — I50.42 CHRONIC COMBINED SYSTOLIC AND DIASTOLIC HEART FAILURE: ICD-10-CM

## 2017-11-20 PROCEDURE — 93295 DEV INTERROG REMOTE 1/2/MLT: CPT | Mod: S$GLB,,, | Performed by: INTERNAL MEDICINE

## 2017-11-20 PROCEDURE — 93297 REM INTERROG DEV EVAL ICPMS: CPT | Mod: S$GLB,,, | Performed by: INTERNAL MEDICINE

## 2017-11-20 PROCEDURE — 93296 REM INTERROG EVL PM/IDS: CPT | Mod: S$GLB,,, | Performed by: INTERNAL MEDICINE

## 2017-12-04 ENCOUNTER — TELEPHONE (OUTPATIENT)
Dept: URGENT CARE | Facility: CLINIC | Age: 62
End: 2017-12-04

## 2017-12-04 ENCOUNTER — HOSPITAL ENCOUNTER (OUTPATIENT)
Dept: RADIOLOGY | Facility: HOSPITAL | Age: 62
Discharge: HOME | End: 2017-12-04
Attending: PHYSICIAN ASSISTANT
Payer: COMMERCIAL

## 2017-12-04 ENCOUNTER — OFFICE VISIT (OUTPATIENT)
Dept: URGENT CARE | Facility: CLINIC | Age: 62
End: 2017-12-04
Payer: COMMERCIAL

## 2017-12-04 VITALS
DIASTOLIC BLOOD PRESSURE: 78 MMHG | OXYGEN SATURATION: 97 % | BODY MASS INDEX: 33.45 KG/M2 | SYSTOLIC BLOOD PRESSURE: 132 MMHG | HEIGHT: 68 IN | WEIGHT: 220.69 LBS | HEART RATE: 69 BPM | TEMPERATURE: 97 F

## 2017-12-04 DIAGNOSIS — R06.02 SHORTNESS OF BREATH: Primary | ICD-10-CM

## 2017-12-04 DIAGNOSIS — R06.02 SHORTNESS OF BREATH: ICD-10-CM

## 2017-12-04 PROCEDURE — 99999 PR PBB SHADOW E&M-EST. PATIENT-LVL III: CPT | Mod: PBBFAC,,, | Performed by: PHYSICIAN ASSISTANT

## 2017-12-04 PROCEDURE — 71020 XR CHEST PA AND LATERAL: CPT | Mod: 26,,, | Performed by: RADIOLOGY

## 2017-12-04 PROCEDURE — 94640 AIRWAY INHALATION TREATMENT: CPT | Mod: S$GLB,,, | Performed by: FAMILY MEDICINE

## 2017-12-04 PROCEDURE — 99214 OFFICE O/P EST MOD 30 MIN: CPT | Mod: S$GLB,,, | Performed by: PHYSICIAN ASSISTANT

## 2017-12-04 PROCEDURE — 71020 XR CHEST PA AND LATERAL: CPT | Mod: TC,PO

## 2017-12-04 RX ORDER — IPRATROPIUM BROMIDE AND ALBUTEROL SULFATE 2.5; .5 MG/3ML; MG/3ML
3 SOLUTION RESPIRATORY (INHALATION)
Status: COMPLETED | OUTPATIENT
Start: 2017-12-04 | End: 2017-12-04

## 2017-12-04 RX ADMIN — IPRATROPIUM BROMIDE AND ALBUTEROL SULFATE 3 ML: 2.5; .5 SOLUTION RESPIRATORY (INHALATION) at 02:12

## 2017-12-04 NOTE — PATIENT INSTRUCTIONS

## 2017-12-04 NOTE — PROGRESS NOTES
"Subjective:       Patient ID: Yan Choudhury is a 62 y.o. male.    Chief Complaint: Shortness of Breath    Shortness of Breath   This is a recurrent problem. The current episode started 1 to 4 weeks ago (has had worse SOB over the past 2 weeks. Also notes some productive cough. ). The problem occurs constantly. The problem has been unchanged. Associated symptoms include orthopnea and sputum production. Pertinent negatives include no abdominal pain, chest pain, coryza, ear pain, fever, headaches, hemoptysis, leg pain, leg swelling (has chronic swelling in legs but this is controlled with the Bumex, no increase in swelling), PND, rash, rhinorrhea, sore throat, syncope, vomiting or wheezing. Nothing aggravates the symptoms. He has tried nothing for the symptoms. His past medical history is significant for a heart failure. (Also has pulm HTN)     Review of Systems   Constitutional: Negative for chills, fatigue and fever.   HENT: Negative for congestion, ear discharge, ear pain, postnasal drip, rhinorrhea, sinus pressure, sneezing and sore throat.    Eyes: Negative for pain and discharge.   Respiratory: Positive for cough, sputum production and shortness of breath. Negative for hemoptysis and wheezing.    Cardiovascular: Positive for orthopnea. Negative for chest pain, leg swelling (has chronic swelling in legs but this is controlled with the Bumex, no increase in swelling), syncope and PND.   Gastrointestinal: Negative for abdominal pain, nausea and vomiting.   Musculoskeletal: Negative for myalgias.   Skin: Negative for rash.   Neurological: Negative for headaches.       Objective:      /78 (BP Location: Right arm, Patient Position: Sitting, BP Method: Medium (Automatic))   Pulse 69   Temp 97.4 °F (36.3 °C) (Tympanic)   Ht 5' 8" (1.727 m)   Wt 100.1 kg (220 lb 10.9 oz)   SpO2 97%   BMI 33.55 kg/m²   Physical Exam   Constitutional: He is oriented to person, place, and time. He appears well-developed and " well-nourished. No distress.   HENT:   Head: Normocephalic and atraumatic.   Right Ear: External ear normal.   Left Ear: External ear normal.   Nose: Nose normal. Right sinus exhibits no maxillary sinus tenderness and no frontal sinus tenderness. Left sinus exhibits no maxillary sinus tenderness and no frontal sinus tenderness.   Eyes: Conjunctivae and EOM are normal. Pupils are equal, round, and reactive to light. Right eye exhibits no discharge. Left eye exhibits no discharge.   Neck: Normal range of motion. Neck supple.   Cardiovascular: Normal rate, regular rhythm, normal heart sounds and intact distal pulses.  Exam reveals no gallop and no friction rub.    No murmur heard.  Pulmonary/Chest: Effort normal. No stridor. No respiratory distress. He has no decreased breath sounds. He has no wheezes. He has no rhonchi. He has rales (mild crackles to bases bilaterally). He exhibits no tenderness.   Abdominal: Soft. Bowel sounds are normal. He exhibits no distension. There is no tenderness. There is no rebound, no guarding and no CVA tenderness.   LLQ colostomy bag in place with brown stool   Musculoskeletal: He exhibits edema (1+ pitting edema to BLE).   Lymphadenopathy:     He has no cervical adenopathy.   Neurological: He is alert and oriented to person, place, and time.   Skin: Skin is warm and dry. No rash noted. He is not diaphoretic. No erythema.   Nursing note and vitals reviewed.      Assessment:       1. Shortness of breath        Plan:       Shortness of breath  -     X-Ray Chest PA And Lateral; Future; Expected date: 12/04/2017  -     albuterol-ipratropium 2.5mg-0.5mg/3mL nebulizer solution 3 mL; Take 3 mLs by nebulization one time.  -     ipratropium-albuterol (COMBIVENT)  mcg/actuation inhaler; Inhale 1 puff into the lungs every 6 (six) hours as needed for Wheezing or Shortness of Breath. Rescue  Dispense: 1 Package; Refill: 0  -     CBC auto differential; Future; Expected date: 12/04/2017  -      Brain natriuretic peptide; Future; Expected date: 12/04/2017  -     Basic metabolic panel; Future; Expected date: 12/04/2017    Unclear cause of worsening dyspnea. Patient has been changed to daily Bumex over the past couple of months and this resulted in improvement in his renal function. Unfortunately his dyspnea is worse. O2 sat is normal and while he has crackles these are mild and his CXR does not reveal any acute process or worsening pulm edema. In fact his weight is down 2 lb from one month ago. No fever. Discussed case with PCP Dr. Estevez. Duoneb given in clinic with some slight improvement in dyspnea so will give inhaler for home. Check labs with Cr and BNP today and schedule follow up with Cardiology in the next couple of day. Advised ER for any worsening of SOB.    Go to the ER for worsening shortness of breath or if your breathing worsens in any way.  Follow up with Cardiology tomorrow.        Heather Trant PA-C [facility]

## 2017-12-05 ENCOUNTER — OFFICE VISIT (OUTPATIENT)
Dept: CARDIOLOGY | Facility: CLINIC | Age: 62
End: 2017-12-05
Payer: COMMERCIAL

## 2017-12-05 VITALS
HEART RATE: 64 BPM | DIASTOLIC BLOOD PRESSURE: 70 MMHG | BODY MASS INDEX: 33.54 KG/M2 | SYSTOLIC BLOOD PRESSURE: 122 MMHG | HEIGHT: 68 IN | WEIGHT: 221.31 LBS

## 2017-12-05 DIAGNOSIS — I27.20 PULMONARY HTN: ICD-10-CM

## 2017-12-05 DIAGNOSIS — N18.30 STAGE 3 CHRONIC KIDNEY DISEASE: ICD-10-CM

## 2017-12-05 DIAGNOSIS — I25.10 CAD, MULTIPLE VESSEL: Primary | Chronic | ICD-10-CM

## 2017-12-05 DIAGNOSIS — G47.33 OSA ON CPAP: Chronic | ICD-10-CM

## 2017-12-05 DIAGNOSIS — I44.7 LBBB (LEFT BUNDLE BRANCH BLOCK): ICD-10-CM

## 2017-12-05 DIAGNOSIS — I25.5 ISCHEMIC CARDIOMYOPATHY: ICD-10-CM

## 2017-12-05 DIAGNOSIS — Z95.810 BIVENTRICULAR ICD (IMPLANTABLE CARDIOVERTER-DEFIBRILLATOR) IN PLACE: Chronic | ICD-10-CM

## 2017-12-05 DIAGNOSIS — E11.22 TYPE 2 DIABETES MELLITUS WITH STAGE 3 CHRONIC KIDNEY DISEASE, WITH LONG-TERM CURRENT USE OF INSULIN: ICD-10-CM

## 2017-12-05 DIAGNOSIS — I15.2 HYPERTENSION ASSOCIATED WITH DIABETES: ICD-10-CM

## 2017-12-05 DIAGNOSIS — E78.5 HYPERLIPIDEMIA ASSOCIATED WITH TYPE 2 DIABETES MELLITUS: ICD-10-CM

## 2017-12-05 DIAGNOSIS — Z79.4 TYPE 2 DIABETES MELLITUS WITH STAGE 3 CHRONIC KIDNEY DISEASE, WITH LONG-TERM CURRENT USE OF INSULIN: ICD-10-CM

## 2017-12-05 DIAGNOSIS — I50.40: Chronic | ICD-10-CM

## 2017-12-05 DIAGNOSIS — E11.69 HYPERLIPIDEMIA ASSOCIATED WITH TYPE 2 DIABETES MELLITUS: ICD-10-CM

## 2017-12-05 DIAGNOSIS — N18.30 TYPE 2 DIABETES MELLITUS WITH STAGE 3 CHRONIC KIDNEY DISEASE, WITH LONG-TERM CURRENT USE OF INSULIN: ICD-10-CM

## 2017-12-05 DIAGNOSIS — E11.59 HYPERTENSION ASSOCIATED WITH DIABETES: ICD-10-CM

## 2017-12-05 PROCEDURE — 99999 PR PBB SHADOW E&M-EST. PATIENT-LVL II: CPT | Mod: PBBFAC,,, | Performed by: INTERNAL MEDICINE

## 2017-12-05 PROCEDURE — 99214 OFFICE O/P EST MOD 30 MIN: CPT | Mod: S$GLB,,, | Performed by: INTERNAL MEDICINE

## 2017-12-05 NOTE — PROGRESS NOTES
Subjective:    Patient ID:  Yan Choudhury is a 62 y.o. male who presents for evaluation of Shortness of Breath; Hypertension; Cardiomyopathy; Coronary Artery Disease; and Congestive Heart Failure      HPI Mr. Choudhury returns for f/u.  His current medical conditions include CAD/ICM, DCM, LBBB, HTN, CRI, DM, and dyslipidemia, obesity. Nonsmoker.   LHC was done 2009 showed diffuse CAD, and severe distal CAD involving apical LAD, distal RCA/distal LCX. Echo 10/10 showed LVEF 25%.   PET stress 2016 showed inferior scar, no significant ischemia noted and LVEF < 35%.   s/p BIV ICD 3/16 for CHF/LBBB.  I last saw pt a year + ago.  He has chronic LIM, stable.  No angina.  Weight up 30 pounds from Sept 2016.  BNP higher.  Creatinine higher now 2.8  States mindful of diet.  ICD has not fired.  Lipids and DM controlled on current meds.  Has seen Nephrology last month.  He is on Bumex 2 mg bid Tues, thurs,, all other days once/daily.   ICD well functioning.    Patient Active Problem List   Diagnosis    Hypertension associated with diabetes    Stage 3 chronic kidney disease    ED (erectile dysfunction)    Ischemic cardiomyopathy    Pulmonary HTN    Hyperlipidemia associated with type 2 diabetes mellitus    Loss of eye - Right Eye    LBBB (left bundle branch block)    Abnormal stress test    Cysts of eyelids    CAD, multiple vessel    Primary open-angle glaucoma, moderate stage    CHF NYHA class II, unspecified failure chronicity, combined    Biventricular ICD (implantable cardioverter-defibrillator) in place    Anemia    Colostomy in place    Type 2 diabetes mellitus with stage 3 chronic kidney disease, with long-term current use of insulin    JAHAIRA on CPAP    Gastroesophageal reflux disease    Chronic gout without tophus     Past Medical History:   Diagnosis Date    Arthritis     CAD (coronary artery disease)     Cataract     CHF (congestive heart failure)     Dr Calvillo    Chronic combined systolic and diastolic  "congestive heart failure 3/24/2015    CKD (chronic kidney disease), stage III     Diabetes mellitus type II      AM    Diabetic retinopathy     anti Veg F injections for macular edema    Diverticulitis of colon     ED (erectile dysfunction)     Glaucoma     HTN (hypertension)     Hyperlipidemia     Ischemic cardiomyopathy     Obesity     Pulmonary HTN        Current Outpatient Prescriptions:     allopurinol (ZYLOPRIM) 100 MG tablet, TAKE 1 TABLET (100 MG TOTAL) BY MOUTH ONCE DAILY., Disp: 90 tablet, Rfl: 0    aspirin (ECOTRIN) 81 MG EC tablet, Take 81 mg by mouth once daily., Disp: , Rfl:     BD INSULIN SYRINGE ULTRA-FINE 0.5 mL 31 gauge x 5/16 Syrg, USE AS DIRECTED TO INJECT INSULIN TWO TIMES DAILY, Disp: 60 each, Rfl: 11    blood sugar diagnostic Strp, 1 strip by Misc.(Non-Drug; Combo Route) route 4 (four) times daily. For Free Style Meter, Disp: 120 strip, Rfl: 11    bumetanide (BUMEX) 2 MG tablet, Take 1 tablet (2 mg total) by mouth once daily. (Patient taking differently: Take 2 mg by mouth 2 (two) times daily. ), Disp: 90 tablet, Rfl: 3    carvedilol (COREG) 25 MG tablet, Take 0.5 tablets (12.5 mg total) by mouth 2 (two) times daily with meals. Takes one-half tablet by mouth twice a day, Disp: 30 tablet, Rfl: 11    guaifenesin (MUCINEX) 600 mg 12 hr tablet, Take 1,200 mg by mouth 2 (two) times daily as needed. , Disp: , Rfl:     insulin glargine (LANTUS) 100 unit/mL injection, 50 units bid, Disp: 30 mL, Rfl: 11    insulin needles, disposable, (BD ULTRA-FINE JOSLYN PEN NEEDLES) 32 x 5/32 " Ndle, 1 each by Misc.(Non-Drug; Combo Route) route 4 (four) times daily., Disp: 100 each, Rfl: 11    ipratropium-albuterol (COMBIVENT)  mcg/actuation inhaler, Inhale 1 puff into the lungs every 6 (six) hours as needed for Wheezing or Shortness of Breath. Rescue, Disp: 1 Package, Rfl: 0    IRON/VITAMIN B COMPLEX (GERITOL ORAL), Take by mouth., Disp: , Rfl:     lancets Misc, 1 lancet by " "Misc.(Non-Drug; Combo Route) route 4 (four) times daily. For Free Style Meter, Disp: 120 each, Rfl: 11    lisinopril (PRINIVIL,ZESTRIL) 5 MG tablet, Take 1 tablet (5 mg total) by mouth once daily., Disp: 90 tablet, Rfl: 3    pantoprazole (PROTONIX) 40 MG tablet, TAKE 1 TABLET BY MOUTH EVERY DAY FOR ACID REFLUX, Disp: 30 tablet, Rfl: 4    potassium chloride SA (K-DUR,KLOR-CON) 10 MEQ tablet, TAKE 1 TABLET BY MOUTH EVERY DAY, Disp: 90 tablet, Rfl: 2    simvastatin (ZOCOR) 10 MG tablet, Take 1 tablet (10 mg total) by mouth every evening., Disp: 30 tablet, Rfl: 11      Review of Systems   Constitution: Positive for weight gain.   HENT: Negative.    Eyes: Negative.    Cardiovascular: Positive for dyspnea on exertion.   Respiratory: Positive for shortness of breath.    Endocrine: Negative.    Hematologic/Lymphatic: Negative.    Skin: Negative.    Musculoskeletal: Negative.    Gastrointestinal: Negative.    Genitourinary: Negative.    Neurological: Negative.    Psychiatric/Behavioral: Negative.    Allergic/Immunologic: Negative.        /70   Pulse 64   Ht 5' 8" (1.727 m)   Wt 100.4 kg (221 lb 5.5 oz)   BMI 33.65 kg/m²     Wt Readings from Last 3 Encounters:   12/05/17 100.4 kg (221 lb 5.5 oz)   12/04/17 100.1 kg (220 lb 10.9 oz)   11/03/17 101.1 kg (222 lb 14.2 oz)     Temp Readings from Last 3 Encounters:   12/04/17 97.4 °F (36.3 °C) (Tympanic)   11/02/17 97.6 °F (36.4 °C) (Oral)   08/11/17 96.7 °F (35.9 °C) (Tympanic)     BP Readings from Last 3 Encounters:   12/05/17 122/70   12/04/17 132/78   11/03/17 138/80     Pulse Readings from Last 3 Encounters:   12/05/17 64   12/04/17 69   11/03/17 80          Objective:    Physical Exam   Constitutional: He is oriented to person, place, and time. He appears well-developed and well-nourished.   HENT:   Head: Normocephalic.   Neck: Normal range of motion. Neck supple. Normal carotid pulses, no hepatojugular reflux and no JVD present. Carotid bruit is not present. No " thyromegaly present.   Cardiovascular: Normal rate, regular rhythm, S1 normal and S2 normal.  PMI is not displaced.  Exam reveals no S3, no S4, no distant heart sounds, no friction rub, no midsystolic click and no opening snap.    No murmur heard.  Pulses:       Radial pulses are 2+ on the right side, and 2+ on the left side.   Pulmonary/Chest: Effort normal and breath sounds normal. He has no wheezes. He has no rales.   Abdominal: Soft. Bowel sounds are normal. He exhibits no distension, no abdominal bruit, no ascites and no mass. There is no tenderness.   obese   Musculoskeletal: He exhibits edema.   Neurological: He is alert and oriented to person, place, and time.   Skin: Skin is warm.   Psychiatric: He has a normal mood and affect. His behavior is normal.   Nursing note and vitals reviewed.      I have reviewed all pertinent labs and cardiac studies.    Component      Latest Ref Rng & Units 12/4/2017             BNP      0 - 99 pg/mL 1,822 (H)         Chemistry        Component Value Date/Time     12/04/2017 1535    K 3.9 12/04/2017 1535     12/04/2017 1535    CO2 26 12/04/2017 1535    BUN 39 (H) 12/04/2017 1535    CREATININE 2.8 (H) 12/04/2017 1535    GLU 79 12/04/2017 1535        Component Value Date/Time    CALCIUM 9.7 12/04/2017 1535    ALKPHOS 111 05/02/2017 1608    AST 26 05/02/2017 1608    ALT 11 05/02/2017 1608    BILITOT 1.0 05/02/2017 1608    ESTGFRAFRICA 26 (A) 12/04/2017 1535    EGFRNONAA 23 (A) 12/04/2017 1535        Lab Results   Component Value Date    WBC 7.72 12/04/2017    HGB 13.7 (L) 12/04/2017    HCT 41.4 12/04/2017    MCV 90 12/04/2017     12/04/2017     Lab Results   Component Value Date    HGBA1C 6.4 (H) 10/25/2017     Lab Results   Component Value Date    CHOL 112 (L) 04/20/2017    CHOL 107 (L) 11/17/2016    CHOL 105 (L) 05/17/2016     Lab Results   Component Value Date    HDL 39 (L) 04/20/2017    HDL 31 (L) 11/17/2016    HDL 20 (L) 05/17/2016     Lab Results    Component Value Date    LDLCALC 58.6 (L) 04/20/2017    LDLCALC 58.2 (L) 11/17/2016    LDLCALC 60.0 (L) 05/17/2016     Lab Results   Component Value Date    TRIG 72 04/20/2017    TRIG 89 11/17/2016    TRIG 125 05/17/2016     Lab Results   Component Value Date    CHOLHDL 34.8 04/20/2017    CHOLHDL 29.0 11/17/2016    CHOLHDL 19.0 (L) 05/17/2016           Assessment:       1. CAD, multiple vessel    2. CHF NYHA class II, unspecified failure chronicity, combined    3. Biventricular ICD (implantable cardioverter-defibrillator) in place    4. JAHAIRA on CPAP    5. Hypertension associated with diabetes    6. Stage 3 chronic kidney disease    7. Ischemic cardiomyopathy    8. Pulmonary HTN    9. Hyperlipidemia associated with type 2 diabetes mellitus    10. LBBB (left bundle branch block)    11. Type 2 diabetes mellitus with stage 3 chronic kidney disease, with long-term current use of insulin         Plan:             - Continue current medical tx for CHF.  - Overall his CHF sxs are stable per pt and therefore would not change his diuretics further as he has fine balance with renal failure and CHF.  - Ultimately he will need to go on dialysis as it will become more difficult to control his fluid status in future with CHF, but for now will stay put and monitor.  This was discussed with pt today.   - May take extra Bumex on prn basis.  - Recheck echo  - Cardiac low salt diet.  - Continue f/u in CHF clinic, next appt scheduled next month.    F/u with me 3 months.

## 2017-12-11 ENCOUNTER — OFFICE VISIT (OUTPATIENT)
Dept: OPHTHALMOLOGY | Facility: CLINIC | Age: 62
End: 2017-12-11
Payer: COMMERCIAL

## 2017-12-11 ENCOUNTER — CLINICAL SUPPORT (OUTPATIENT)
Dept: CARDIOLOGY | Facility: CLINIC | Age: 62
End: 2017-12-11
Attending: INTERNAL MEDICINE
Payer: COMMERCIAL

## 2017-12-11 DIAGNOSIS — I44.7 LBBB (LEFT BUNDLE BRANCH BLOCK): ICD-10-CM

## 2017-12-11 DIAGNOSIS — I25.5 ISCHEMIC CARDIOMYOPATHY: ICD-10-CM

## 2017-12-11 DIAGNOSIS — N18.30 STAGE 3 CHRONIC KIDNEY DISEASE: ICD-10-CM

## 2017-12-11 DIAGNOSIS — I27.20 PULMONARY HTN: ICD-10-CM

## 2017-12-11 DIAGNOSIS — H40.1122 PRIMARY OPEN ANGLE GLAUCOMA OF LEFT EYE, MODERATE STAGE: Primary | ICD-10-CM

## 2017-12-11 DIAGNOSIS — Z95.810 BIVENTRICULAR ICD (IMPLANTABLE CARDIOVERTER-DEFIBRILLATOR) IN PLACE: Chronic | ICD-10-CM

## 2017-12-11 DIAGNOSIS — I50.40: Chronic | ICD-10-CM

## 2017-12-11 LAB
AORTIC VALVE REGURGITATION: ABNORMAL
DIASTOLIC DYSFUNCTION: YES
ESTIMATED PA SYSTOLIC PRESSURE: 48.43
MITRAL VALVE REGURGITATION: ABNORMAL
RETIRED EF AND QEF - SEE NOTES: 20 (ref 55–65)
TRICUSPID VALVE REGURGITATION: ABNORMAL

## 2017-12-11 PROCEDURE — 93306 TTE W/DOPPLER COMPLETE: CPT | Mod: S$GLB,,, | Performed by: NUCLEAR MEDICINE

## 2017-12-11 PROCEDURE — 99999 PR PBB SHADOW E&M-EST. PATIENT-LVL II: CPT | Mod: PBBFAC,,, | Performed by: OPHTHALMOLOGY

## 2017-12-11 PROCEDURE — 92012 INTRM OPH EXAM EST PATIENT: CPT | Mod: S$GLB,,, | Performed by: OPHTHALMOLOGY

## 2017-12-11 PROCEDURE — 92083 EXTENDED VISUAL FIELD XM: CPT | Mod: S$GLB,,, | Performed by: OPHTHALMOLOGY

## 2017-12-11 RX ORDER — LATANOPROST 50 UG/ML
1 SOLUTION/ DROPS OPHTHALMIC NIGHTLY
Qty: 2.5 ML | Refills: 4 | Status: SHIPPED | OUTPATIENT
Start: 2017-12-11 | End: 2018-05-06 | Stop reason: SDUPTHER

## 2017-12-11 NOTE — PROGRESS NOTES
"    ===============================  12/11/2017   Yan Choudhury,   62 y.o. male   Last visit [facility]: :9/12/2017   Last visit eye dept. 9/12/2017  [facility]:  Corrected distance visual acuity was NLP in the right eye and 20/40 in the left eye.  Tonometry     Tonometry (Applanation, 11:17 AM)       Right Left    Pressure  21          Tonometry #2 (Applanation, 12:00 PM)       Right Left    Pressure  19               Not recorded         Not recorded        Chief Complaint   Patient presents with    Glaucoma     REV HVF/  DILATED EXAM/  SDP'S        HPI     Glaucoma    Additional comments: REV HVF/  DILATED EXAM/  SDP'S           Comments   --DM since 2000  --H/O BDR and DME OS  H/o [facility] 2014  --Focal OS June 2011  --VRTX/VMA OS "would not expect improvement with treatment secondary to   VRTX"  --Prosthesis OD secondary to trauma  --COAG   OFF GTTS  Tmax 25  (-1)  Gonio Open  Low RNFL  Last VF Bjerrum OS 3/2016  HVF 12/11/17       Last edited by Estella Forbes on 12/11/2017 11:04 AM. (History)          ________________  12/11/2017  Problem List Items Addressed This Visit        Eye/Vision problems    Primary open-angle glaucoma, moderate stage - Primary    Relevant Orders    Garcia Visual Field - OU - Extended - Both Eyes (Completed)        Vf new worse bjerrum '  resume drops   T 19   Add Xalatan qhs os  Prosthesis OD    rtc 3-4 weeks to check iop on drops    .       ===========================    "

## 2017-12-19 ENCOUNTER — TELEPHONE (OUTPATIENT)
Dept: CARDIOLOGY | Facility: CLINIC | Age: 62
End: 2017-12-19

## 2017-12-19 NOTE — TELEPHONE ENCOUNTER
I spoke with pts wife.   She states pts Wt is up 221-226lbs.   Sob more now than usual.  Taking bumex  2 mg bid- tu th sat  2 mg once daily on all other days.   No pnd or orthopnea  LE swelling some, not bad.   Please advise.

## 2017-12-19 NOTE — TELEPHONE ENCOUNTER
The patient has been notified of this information and all questions answered.  phrev is scheduled for Monday to f/u

## 2017-12-19 NOTE — TELEPHONE ENCOUNTER
----- Message from Meron Rodriguez sent at 12/19/2017  8:40 AM CST -----  Contact: WifeMine Zimmerman- [phone]   Would like to consult with the nurse regarding fluid pill and weight gain.  Please call back at [phone]. [facility]-

## 2017-12-21 ENCOUNTER — HOSPITAL ENCOUNTER (EMERGENCY)
Facility: HOSPITAL | Age: 62
Discharge: HOME | End: 2017-12-21
Attending: EMERGENCY MEDICINE
Payer: COMMERCIAL

## 2017-12-21 VITALS
DIASTOLIC BLOOD PRESSURE: 90 MMHG | TEMPERATURE: 99 F | OXYGEN SATURATION: 95 % | HEIGHT: 68 IN | SYSTOLIC BLOOD PRESSURE: 130 MMHG | RESPIRATION RATE: 20 BRPM | BODY MASS INDEX: 34.06 KG/M2 | WEIGHT: 224.75 LBS | HEART RATE: 69 BPM

## 2017-12-21 DIAGNOSIS — I50.9 CONGESTIVE HEART FAILURE, UNSPECIFIED CONGESTIVE HEART FAILURE CHRONICITY, UNSPECIFIED CONGESTIVE HEART FAILURE TYPE: Primary | ICD-10-CM

## 2017-12-21 DIAGNOSIS — R06.02 SHORTNESS OF BREATH: ICD-10-CM

## 2017-12-21 LAB
ALBUMIN SERPL BCP-MCNC: 2.9 G/DL
ALLENS TEST: ABNORMAL
ALP SERPL-CCNC: 92 U/L
ALT SERPL W/O P-5'-P-CCNC: 22 U/L
ANION GAP SERPL CALC-SCNC: 13 MMOL/L
AST SERPL-CCNC: 39 U/L
BASOPHILS # BLD AUTO: 0.03 K/UL
BASOPHILS NFR BLD: 0.5 %
BILIRUB SERPL-MCNC: 1.2 MG/DL
BNP SERPL-MCNC: 1708 PG/ML
BUN SERPL-MCNC: 38 MG/DL
CALCIUM SERPL-MCNC: 8.9 MG/DL
CHLORIDE SERPL-SCNC: 106 MMOL/L
CO2 SERPL-SCNC: 20 MMOL/L
CREAT SERPL-MCNC: 2.3 MG/DL
DELSYS: ABNORMAL
DIFFERENTIAL METHOD: ABNORMAL
EOSINOPHIL # BLD AUTO: 0.1 K/UL
EOSINOPHIL NFR BLD: 1.4 %
ERYTHROCYTE [DISTWIDTH] IN BLOOD BY AUTOMATED COUNT: 16.2 %
EST. GFR  (AFRICAN AMERICAN): 33 ML/MIN/1.73 M^2
EST. GFR  (NON AFRICAN AMERICAN): 29 ML/MIN/1.73 M^2
FIO2: 21
GIANT PLATELETS BLD QL SMEAR: PRESENT
GLUCOSE SERPL-MCNC: 188 MG/DL
HCO3 UR-SCNC: 23.7 MMOL/L (ref 24–28)
HCT VFR BLD AUTO: 43.2 %
HGB BLD-MCNC: 14.1 G/DL
INR PPP: 1.3
LYMPHOCYTES # BLD AUTO: 2.1 K/UL
LYMPHOCYTES NFR BLD: 32.7 %
MCH RBC QN AUTO: 29 PG
MCHC RBC AUTO-ENTMCNC: 32.6 G/DL
MCV RBC AUTO: 89 FL
MODE: ABNORMAL
MONOCYTES # BLD AUTO: 0.6 K/UL
MONOCYTES NFR BLD: 10 %
NEUTROPHILS # BLD AUTO: 3.6 K/UL
NEUTROPHILS NFR BLD: 55.7 %
PCO2 BLDA: 36.6 MMHG (ref 35–45)
PH SMN: 7.42 [PH] (ref 7.35–7.45)
PLATELET # BLD AUTO: 116 K/UL
PLATELET BLD QL SMEAR: ABNORMAL
PMV BLD AUTO: ABNORMAL FL
PO2 BLDA: 54 MMHG (ref 80–100)
POC BE: -1 MMOL/L
POC SATURATED O2: 88 % (ref 95–100)
POTASSIUM SERPL-SCNC: 4.1 MMOL/L
PROT SERPL-MCNC: 7.7 G/DL
PROTHROMBIN TIME: 13 SEC
RBC # BLD AUTO: 4.86 M/UL
SAMPLE: ABNORMAL
SITE: ABNORMAL
SODIUM SERPL-SCNC: 139 MMOL/L
TROPONIN I SERPL DL<=0.01 NG/ML-MCNC: 0.03 NG/ML
WBC # BLD AUTO: 6.43 K/UL

## 2017-12-21 PROCEDURE — 25000003 PHARM REV CODE 250: Performed by: EMERGENCY MEDICINE

## 2017-12-21 PROCEDURE — S0171 BUMETANIDE 0.5 MG: HCPCS | Performed by: EMERGENCY MEDICINE

## 2017-12-21 PROCEDURE — 96374 THER/PROPH/DIAG INJ IV PUSH: CPT

## 2017-12-21 PROCEDURE — 83880 ASSAY OF NATRIURETIC PEPTIDE: CPT

## 2017-12-21 PROCEDURE — 93010 ELECTROCARDIOGRAM REPORT: CPT | Mod: ,,, | Performed by: INTERNAL MEDICINE

## 2017-12-21 PROCEDURE — 80053 COMPREHEN METABOLIC PANEL: CPT

## 2017-12-21 PROCEDURE — 99284 EMERGENCY DEPT VISIT MOD MDM: CPT | Mod: 25

## 2017-12-21 PROCEDURE — 93005 ELECTROCARDIOGRAM TRACING: CPT

## 2017-12-21 PROCEDURE — 82803 BLOOD GASES ANY COMBINATION: CPT

## 2017-12-21 PROCEDURE — 99900035 HC TECH TIME PER 15 MIN (STAT)

## 2017-12-21 PROCEDURE — 36600 WITHDRAWAL OF ARTERIAL BLOOD: CPT

## 2017-12-21 PROCEDURE — 85610 PROTHROMBIN TIME: CPT

## 2017-12-21 PROCEDURE — 84484 ASSAY OF TROPONIN QUANT: CPT

## 2017-12-21 PROCEDURE — 85025 COMPLETE CBC W/AUTO DIFF WBC: CPT

## 2017-12-21 RX ORDER — BUMETANIDE 0.25 MG/ML
1 INJECTION INTRAMUSCULAR; INTRAVENOUS
Status: COMPLETED | OUTPATIENT
Start: 2017-12-21 | End: 2017-12-21

## 2017-12-21 RX ADMIN — BUMETANIDE 1 MG: 0.25 INJECTION INTRAMUSCULAR; INTRAVENOUS at 11:12

## 2017-12-21 NOTE — ED PROVIDER NOTES
SCRIBE #1 NOTE: I, Timur Gurrola, am scribing for, and in the presence of, Seymour Cox Jr., MD. I have scribed the entire note.      History      Chief Complaint   Patient presents with    Shortness of Breath     patient c/o SOB and chest pressure       Review of patient's allergies indicates:  No Known Allergies     HPI   HPI    12/21/2017, 9:41 AM   History obtained from the patient and wife      History of Present Illness: Yan Choudhury is a 62 y.o. male patient with a PMHx of CHF, who presents to the Emergency Department for SOB which onset gradually 2 days ago. Sxs are constant and moderate in severity. There are no mitigating or exacerbating factors noted. Associated sxs include leg swelling.  Pt denies any fever, N/V/D, chills, HA, calf pain, numbness, arm pain, LOC, CP and all other sxs at this time. No further complaints or concerns at this time.       Arrival mode: AASI    PCP: Sandra Estevez MD       Past Medical History:  Past Medical History:   Diagnosis Date    Arthritis     CAD (coronary artery disease)     Cataract     CHF (congestive heart failure)     Dr Calvillo    Chronic combined systolic and diastolic congestive heart failure 3/24/2015    CKD (chronic kidney disease), stage III     Diabetes mellitus type II      AM    Diabetic retinopathy     anti Veg F injections for macular edema    Diverticulitis of colon     ED (erectile dysfunction)     Glaucoma     HTN (hypertension)     Hyperlipidemia     Ischemic cardiomyopathy     Obesity     Pulmonary HTN        Past Surgical History:  Past Surgical History:   Procedure Laterality Date    CARDIAC CATHETERIZATION  2009    CHOLECYSTECTOMY      COLONOSCOPY      EYE SURGERY      KNEE SURGERY           Family History:  Family History   Problem Relation Age of Onset    Cancer Mother     Heart disease Father     Stroke Father     No Known Problems Sister     No Known Problems Brother     No Known Problems Maternal Aunt   "   No Known Problems Maternal Uncle     No Known Problems Paternal Aunt     No Known Problems Paternal Uncle     No Known Problems Maternal Grandmother     No Known Problems Maternal Grandfather     No Known Problems Paternal Grandmother     No Known Problems Paternal Grandfather     Amblyopia Neg Hx     Blindness Neg Hx     Cataracts Neg Hx     Diabetes Neg Hx     Glaucoma Neg Hx     Hypertension Neg Hx     Macular degeneration Neg Hx     Retinal detachment Neg Hx     Strabismus Neg Hx     Thyroid disease Neg Hx        Social History:  Social History     Social History Main Topics    Smoking status: Never Smoker    Smokeless tobacco: Never Used    Alcohol use 0.0 oz/week      Comment: " once in awhile...special occassions"    Drug use: No    Sexual activity: Not Currently       ROS   Review of Systems   Constitutional: Negative for fever.   HENT: Negative for sore throat.    Respiratory: Positive for shortness of breath.    Cardiovascular: Positive for leg swelling. Negative for chest pain.   Gastrointestinal: Negative for abdominal pain, constipation, diarrhea, nausea and vomiting.   Genitourinary: Negative for dysuria.   Musculoskeletal: Negative for back pain.   Skin: Negative for rash.   Neurological: Negative for weakness.   Hematological: Does not bruise/bleed easily.       Physical Exam      Initial Vitals [12/21/17 0912]   BP Pulse Resp Temp SpO2   (!) 156/86 76 (!) 22 98.7 °F (37.1 °C) 98 %      MAP       109.33          Physical Exam  Nursing Notes and Vital Signs Reviewed.  Constitutional: Patient is in no acute distress. Well-developed and well-nourished.  Head: Atraumatic. Normocephalic.  Eyes: PERRL. EOM intact. Conjunctivae are not pale. No scleral icterus.  ENT: Mucous membranes are moist. Oropharynx is clear and symmetric.    Neck: Supple. Full ROM. No lymphadenopathy.  Cardiovascular: Regular rate. Regular rhythm. No murmurs, rubs, or gallops. Distal pulses are 2+ and " "symmetric.  Pulmonary/Chest: No respiratory distress. Bibasilar crackles. No wheezing or rales.  Abdominal: Soft and non-distended.  There is no tenderness.  No rebound, guarding, or rigidity. Good bowel sounds.  Genitourinary: No CVA tenderness  Musculoskeletal: Moves all extremities. No obvious deformities. 1+ BLE edema. No calf tenderness.  Skin: Warm and dry.  Neurological:  Alert, awake, and appropriate.  Normal speech.  No acute focal neurological deficits are appreciated.  Psychiatric: Normal affect. Good eye contact. Appropriate in content.    ED Course    Procedures  ED Vital Signs:  Vitals:    12/21/17 0912 12/21/17 0955 12/21/17 0956 12/21/17 1002   BP: (!) 156/86   (!) 147/96   Pulse: 76 71  70   Resp: (!) 22   15   Temp: 98.7 °F (37.1 °C)      SpO2: 98%  97% 98%   Weight: 101.9 kg (224 lb 12.1 oz)      Height: 5' 8" (1.727 m)       12/21/17 1118 12/21/17 1219   BP: 126/78 (!) 130/90   Pulse: 64 69   Resp: (!) 23 20   Temp:     SpO2: 98% 95%   Weight:     Height:         Abnormal Lab Results:  Labs Reviewed   CBC W/ AUTO DIFFERENTIAL - Abnormal; Notable for the following:        Result Value    RDW 16.2 (*)     Platelets 116 (*)     Platelet Estimate Decreased (*)     All other components within normal limits   COMPREHENSIVE METABOLIC PANEL - Abnormal; Notable for the following:     CO2 20 (*)     Glucose 188 (*)     BUN, Bld 38 (*)     Creatinine 2.3 (*)     Albumin 2.9 (*)     Total Bilirubin 1.2 (*)     eGFR if  33 (*)     eGFR if non  29 (*)     All other components within normal limits   TROPONIN I - Abnormal; Notable for the following:     Troponin I 0.033 (*)     All other components within normal limits   B-TYPE NATRIURETIC PEPTIDE - Abnormal; Notable for the following:     BNP 1,708 (*)     All other components within normal limits   PROTIME-INR - Abnormal; Notable for the following:     Prothrombin Time 13.0 (*)     INR 1.3 (*)     All other components within " normal limits   ISTAT PROCEDURE - Abnormal; Notable for the following:     POC PO2 54 (*)     POC HCO3 23.7 (*)     POC SATURATED O2 88 (*)     All other components within normal limits        All Lab Results:  Results for orders placed or performed during the hospital encounter of 12/21/17   CBC auto differential   Result Value Ref Range    WBC 6.43 3.90 - 12.70 K/uL    RBC 4.86 4.60 - 6.20 M/uL    Hemoglobin 14.1 14.0 - 18.0 g/dL    Hematocrit 43.2 40.0 - 54.0 %    MCV 89 82 - 98 fL    MCH 29.0 27.0 - 31.0 pg    MCHC 32.6 32.0 - 36.0 g/dL    RDW 16.2 (H) 11.5 - 14.5 %    Platelets 116 (L) 150 - 350 K/uL    MPV SEE COMMENT 9.2 - 12.9 fL    Gran # 3.6 1.8 - 7.7 K/uL    Lymph # 2.1 1.0 - 4.8 K/uL    Mono # 0.6 0.3 - 1.0 K/uL    Eos # 0.1 0.0 - 0.5 K/uL    Baso # 0.03 0.00 - 0.20 K/uL    Gran% 55.7 38.0 - 73.0 %    Lymph% 32.7 18.0 - 48.0 %    Mono% 10.0 4.0 - 15.0 %    Eosinophil% 1.4 0.0 - 8.0 %    Basophil% 0.5 0.0 - 1.9 %    Platelet Estimate Decreased (A)     Large/Giant Platelets Present     Differential Method Automated    Comprehensive metabolic panel   Result Value Ref Range    Sodium 139 136 - 145 mmol/L    Potassium 4.1 3.5 - 5.1 mmol/L    Chloride 106 95 - 110 mmol/L    CO2 20 (L) 23 - 29 mmol/L    Glucose 188 (H) 70 - 110 mg/dL    BUN, Bld 38 (H) 8 - 23 mg/dL    Creatinine 2.3 (H) 0.5 - 1.4 mg/dL    Calcium 8.9 8.7 - 10.5 mg/dL    Total Protein 7.7 6.0 - 8.4 g/dL    Albumin 2.9 (L) 3.5 - 5.2 g/dL    Total Bilirubin 1.2 (H) 0.1 - 1.0 mg/dL    Alkaline Phosphatase 92 55 - 135 U/L    AST 39 10 - 40 U/L    ALT 22 10 - 44 U/L    Anion Gap 13 8 - 16 mmol/L    eGFR if African American 33 (A) >60 mL/min/1.73 m^2    eGFR if non African American 29 (A) >60 mL/min/1.73 m^2   Troponin I   Result Value Ref Range    Troponin I 0.033 (H) 0.000 - 0.026 ng/mL   Brain natriuretic peptide   Result Value Ref Range    BNP 1,708 (H) 0 - 99 pg/mL   Protime-INR   Result Value Ref Range    Prothrombin Time 13.0 (H) 9.0 - 12.5 sec     INR 1.3 (H) 0.8 - 1.2   ISTAT PROCEDURE   Result Value Ref Range    POC PH 7.420 7.35 - 7.45    POC PCO2 36.6 35 - 45 mmHg    POC PO2 54 (LL) 80 - 100 mmHg    POC HCO3 23.7 (L) 24 - 28 mmol/L    POC BE -1 -2 to 2 mmol/L    POC SATURATED O2 88 (L) 95 - 100 %    Sample ARTERIAL     Site LR     Allens Test Pass     DelSys Room Air     Mode SPONT     FiO2 21          Imaging Results:  Imaging Results          X-Ray Chest AP Portable (Final result)  Result time 12/21/17 10:34:00    Final result by Javad Shirley MD (12/21/17 10:34:00)                 Impression:     No acute cardiopulmonary disease.      Electronically signed by: JAVAD SHIRLEY MD  Date:     12/21/17  Time:    10:33              Narrative:    Exam: Portable chest xray    History:    Congestive heart failure.  Shortness of breath.    Findings:     The heart is normal and the lungs are clear.  Aortic atherosclerosis.  Pacemaker on the left.  Comparison made to 12/04/2017.                                      The EKG was ordered, reviewed, and independently interpreted by the ED provider.  Interpretation time: 9:30  Rate: 71 BPM  Rhythm: normal sinus rhythm  Interpretation: RBBB. No STEMI.      The Emergency Provider reviewed the vital signs and test results, which are outlined above.    ED Discussion     11:17 AM: Dr. Cox discussed the pt's case with Dr. Hannon (Cardiology) who recommends giving pt bumex. If pt's sxs improve, pt can be discharged home to f/u as outpatient.    12:15 PM: Reassessed pt. Pt states their condition has improved at this time.  Discussed with pt all pertinent ED information and results. Discussed plan of treatment with pt. Gave pt all f/u and return to the ED instructions. All questions and concerns were addressed at this time. Pt understands and agrees to plan as discussed. Pt is stable for discharge.     I have discussed with patient and/or family/caretaker chest pain precautions, specifically to return for worsening chest pain,  shortness of breath, fever, or any concern.  I have low suspicion for cardiopulmonary, vascular, infectious, respiratory, or other emergent medical condition based on my evaluation in the ED.    ED Medication(s):  Medications   bumetanide injection 1 mg (1 mg Intravenous Given 12/21/17 1116)       Discharge Medication List as of 12/21/2017 12:08 PM          Follow-up Information     Sandra Estevez MD. Call in 2 days.    Specialty:  Family Medicine  Contact information:  [address]  [place] [address] [phone]                     Medical Decision Making    Medical Decision Making:   Clinical Tests:   Lab Tests: Reviewed and Ordered  Radiological Study: Reviewed and Ordered  Medical Tests: Reviewed and Ordered           Scribe Attestation:   Scribe #1: I performed the above scribed service and the documentation accurately describes the services I performed. I attest to the accuracy of the note.    Attending:   Physician Attestation Statement for Scribe #1: I, Seymour Cox Jr., MD, personally performed the services described in this documentation, as scribed by Timur Gurrola, in my presence, and it is both accurate and complete.          Clinical Impression       ICD-10-CM ICD-9-CM   1. Congestive heart failure, unspecified congestive heart failure chronicity, unspecified congestive heart failure type I50.9 428.0   2. Shortness of breath R06.02 786.05       Disposition:   Disposition: Discharged  Condition: Stable         Seymour Cox Jr., MD  12/21/17 1747

## 2017-12-21 NOTE — ED NOTES
Pt ready for dc per Dr. Cox, pt states he is feeling better and ready to go. Pt in NAD, VSS, RR equal and unlabored.

## 2017-12-26 ENCOUNTER — TELEPHONE (OUTPATIENT)
Dept: CARDIOLOGY | Facility: CLINIC | Age: 62
End: 2017-12-26

## 2017-12-26 NOTE — TELEPHONE ENCOUNTER
Called pt states weight is 229lbs states sob hasn't improved much and states edema and chest pressure has improved a little. Pt is scheduled to see Salvador on Thursday 12/28/17 [facility] pt was offered sooner appt states he is out of town.

## 2017-12-26 NOTE — TELEPHONE ENCOUNTER
Spoke with pt wife notified information states pt will go to ER if symptoms get severe but will keep appt with Salvador Thursday due to being out of town.

## 2017-12-28 ENCOUNTER — OFFICE VISIT (OUTPATIENT)
Dept: CARDIOLOGY | Facility: CLINIC | Age: 62
End: 2017-12-28
Payer: COMMERCIAL

## 2017-12-28 VITALS
SYSTOLIC BLOOD PRESSURE: 126 MMHG | DIASTOLIC BLOOD PRESSURE: 80 MMHG | BODY MASS INDEX: 35.05 KG/M2 | HEART RATE: 64 BPM | HEIGHT: 68 IN | WEIGHT: 231.25 LBS

## 2017-12-28 DIAGNOSIS — N18.30 STAGE 3 CHRONIC KIDNEY DISEASE: ICD-10-CM

## 2017-12-28 DIAGNOSIS — I25.10 CAD, MULTIPLE VESSEL: Chronic | ICD-10-CM

## 2017-12-28 DIAGNOSIS — E11.22 TYPE 2 DIABETES MELLITUS WITH STAGE 3 CHRONIC KIDNEY DISEASE, WITH LONG-TERM CURRENT USE OF INSULIN: ICD-10-CM

## 2017-12-28 DIAGNOSIS — E11.69 HYPERLIPIDEMIA ASSOCIATED WITH TYPE 2 DIABETES MELLITUS: ICD-10-CM

## 2017-12-28 DIAGNOSIS — Z79.4 TYPE 2 DIABETES MELLITUS WITH STAGE 3 CHRONIC KIDNEY DISEASE, WITH LONG-TERM CURRENT USE OF INSULIN: ICD-10-CM

## 2017-12-28 DIAGNOSIS — E11.59 HYPERTENSION ASSOCIATED WITH DIABETES: ICD-10-CM

## 2017-12-28 DIAGNOSIS — I27.20 PULMONARY HTN: ICD-10-CM

## 2017-12-28 DIAGNOSIS — G47.33 OSA ON CPAP: Chronic | ICD-10-CM

## 2017-12-28 DIAGNOSIS — I44.7 LBBB (LEFT BUNDLE BRANCH BLOCK): ICD-10-CM

## 2017-12-28 DIAGNOSIS — N18.30 TYPE 2 DIABETES MELLITUS WITH STAGE 3 CHRONIC KIDNEY DISEASE, WITH LONG-TERM CURRENT USE OF INSULIN: ICD-10-CM

## 2017-12-28 DIAGNOSIS — E78.5 HYPERLIPIDEMIA ASSOCIATED WITH TYPE 2 DIABETES MELLITUS: ICD-10-CM

## 2017-12-28 DIAGNOSIS — I50.40: Primary | Chronic | ICD-10-CM

## 2017-12-28 DIAGNOSIS — Z93.3 COLOSTOMY IN PLACE: ICD-10-CM

## 2017-12-28 DIAGNOSIS — I25.5 ISCHEMIC CARDIOMYOPATHY: ICD-10-CM

## 2017-12-28 DIAGNOSIS — I15.2 HYPERTENSION ASSOCIATED WITH DIABETES: ICD-10-CM

## 2017-12-28 DIAGNOSIS — Z95.810 BIVENTRICULAR ICD (IMPLANTABLE CARDIOVERTER-DEFIBRILLATOR) IN PLACE: Chronic | ICD-10-CM

## 2017-12-28 PROCEDURE — 99214 OFFICE O/P EST MOD 30 MIN: CPT | Mod: S$GLB,,, | Performed by: NURSE PRACTITIONER

## 2017-12-28 PROCEDURE — 99999 PR PBB SHADOW E&M-EST. PATIENT-LVL III: CPT | Mod: PBBFAC,,, | Performed by: NURSE PRACTITIONER

## 2017-12-28 NOTE — PROGRESS NOTES
"Subjective:   Patient ID:  Yan Choudhury is a 62 y.o. male who presents for follow up of Congestive Heart Failure      HPI    Patient presents to clinic for follow up and management of ischemic cardiomyopathy. Patient has PMH of combined systolic and diastolic HF(EF 25%), CAD/ICM (diffuse CAD involving apical LAD, distal RCA and LCX), DCM, LBBB, HTN, CRI, DM, and dyslipidemia, obesity. Nonsmoker, s/p BIV ICD 3/16 for CHF/LBBB. Patient seen in the ED on 12/21/17 for SOB. He was noted to have elevated BNP 1700. Treated with IV Bumex and discharged home. States that he took 2mg Bumex BID x 4 days, then resumed 2mg daily and BID every other day. Minimal improvement in SOB. Weight up 7 lbs and has LE edema. Has no chest pain, orthopnea or PND. No dizziness, near syncope or syncope. Hasn't taken Combivent inhaler in 2 weeks. States that it can't be refilled until Monday. Variable diet compliance. BP stable. Has no chest pain. Has chronic SOB. Still followed by Pulmonology and uses CPAP at night. Kidney function. Is followed by Nephrology.       Past Medical History:   Diagnosis Date    Arthritis     CAD (coronary artery disease)     Cataract     CHF (congestive heart failure)     Dr Calvillo    Chronic combined systolic and diastolic congestive heart failure 3/24/2015    CKD (chronic kidney disease), stage III     Diabetes mellitus type II      AM    Diabetic retinopathy     anti Veg F injections for macular edema    Diverticulitis of colon     ED (erectile dysfunction)     Glaucoma     HTN (hypertension)     Hyperlipidemia     Ischemic cardiomyopathy     Obesity     Pulmonary HTN        Past Surgical History:   Procedure Laterality Date    CARDIAC CATHETERIZATION  2009    CHOLECYSTECTOMY      COLONOSCOPY      EYE SURGERY      KNEE SURGERY         Social History   Substance Use Topics    Smoking status: Never Smoker    Smokeless tobacco: Never Used    Alcohol use 0.0 oz/week      Comment: " once " "in awhile...special occassions"       Family History   Problem Relation Age of Onset    Cancer Mother     Heart disease Father     Stroke Father     No Known Problems Sister     No Known Problems Brother     No Known Problems Maternal Aunt     No Known Problems Maternal Uncle     No Known Problems Paternal Aunt     No Known Problems Paternal Uncle     No Known Problems Maternal Grandmother     No Known Problems Maternal Grandfather     No Known Problems Paternal Grandmother     No Known Problems Paternal Grandfather     Amblyopia Neg Hx     Blindness Neg Hx     Cataracts Neg Hx     Diabetes Neg Hx     Glaucoma Neg Hx     Hypertension Neg Hx     Macular degeneration Neg Hx     Retinal detachment Neg Hx     Strabismus Neg Hx     Thyroid disease Neg Hx        Current Outpatient Prescriptions   Medication Sig    allopurinol (ZYLOPRIM) 100 MG tablet TAKE 1 TABLET (100 MG TOTAL) BY MOUTH ONCE DAILY.    aspirin (ECOTRIN) 81 MG EC tablet Take 81 mg by mouth once daily.    BD INSULIN SYRINGE ULTRA-FINE 0.5 mL 31 gauge x 5/16 Syrg USE AS DIRECTED TO INJECT INSULIN TWO TIMES DAILY    blood sugar diagnostic Strp 1 strip by Misc.(Non-Drug; Combo Route) route 4 (four) times daily. For Free Style Meter    bumetanide (BUMEX) 2 MG tablet Take 1 tablet (2 mg total) by mouth once daily. (Patient taking differently: Take 2 mg by mouth As instructed. 2mg bid on tuesdays, thursdays and saturdays,   2mg once daily on all other days)    carvedilol (COREG) 25 MG tablet Take 0.5 tablets (12.5 mg total) by mouth 2 (two) times daily with meals. Takes one-half tablet by mouth twice a day    guaifenesin (MUCINEX) 600 mg 12 hr tablet Take 1,200 mg by mouth 2 (two) times daily as needed.     insulin glargine (LANTUS) 100 unit/mL injection 50 units bid    insulin needles, disposable, (BD ULTRA-FINE JOSLYN PEN NEEDLES) 32 x 5/32 " Ndle 1 each by Misc.(Non-Drug; Combo Route) route 4 (four) times daily.    " ipratropium-albuterol (COMBIVENT)  mcg/actuation inhaler Inhale 1 puff into the lungs every 6 (six) hours as needed for Wheezing or Shortness of Breath. Rescue    IRON/VITAMIN B COMPLEX (GERITOL ORAL) Take by mouth.    lancets Misc 1 lancet by Misc.(Non-Drug; Combo Route) route 4 (four) times daily. For Free Style Meter    latanoprost (XALATAN) 0.005 % ophthalmic solution Place 1 drop into the left eye every evening.    lisinopril (PRINIVIL,ZESTRIL) 5 MG tablet Take 1 tablet (5 mg total) by mouth once daily.    pantoprazole (PROTONIX) 40 MG tablet TAKE 1 TABLET BY MOUTH EVERY DAY FOR ACID REFLUX    potassium chloride SA (K-DUR,KLOR-CON) 10 MEQ tablet TAKE 1 TABLET BY MOUTH EVERY DAY    simvastatin (ZOCOR) 10 MG tablet Take 1 tablet (10 mg total) by mouth every evening.     No current facility-administered medications for this visit.      Current Outpatient Prescriptions on File Prior to Visit   Medication Sig    allopurinol (ZYLOPRIM) 100 MG tablet TAKE 1 TABLET (100 MG TOTAL) BY MOUTH ONCE DAILY.    aspirin (ECOTRIN) 81 MG EC tablet Take 81 mg by mouth once daily.    BD INSULIN SYRINGE ULTRA-FINE 0.5 mL 31 gauge x 5/16 Syrg USE AS DIRECTED TO INJECT INSULIN TWO TIMES DAILY    blood sugar diagnostic Strp 1 strip by Misc.(Non-Drug; Combo Route) route 4 (four) times daily. For Free Style Meter    bumetanide (BUMEX) 2 MG tablet Take 1 tablet (2 mg total) by mouth once daily. (Patient taking differently: Take 2 mg by mouth As instructed. 2mg bid on tuesdays, thursdays and saturdays,   2mg once daily on all other days)    carvedilol (COREG) 25 MG tablet Take 0.5 tablets (12.5 mg total) by mouth 2 (two) times daily with meals. Takes one-half tablet by mouth twice a day    guaifenesin (MUCINEX) 600 mg 12 hr tablet Take 1,200 mg by mouth 2 (two) times daily as needed.     insulin glargine (LANTUS) 100 unit/mL injection 50 units bid    insulin needles, disposable, (BD ULTRA-FINE JOSLYN PEN NEEDLES) 32 x  "5/32 " Ndle 1 each by Misc.(Non-Drug; Combo Route) route 4 (four) times daily.    ipratropium-albuterol (COMBIVENT)  mcg/actuation inhaler Inhale 1 puff into the lungs every 6 (six) hours as needed for Wheezing or Shortness of Breath. Rescue    IRON/VITAMIN B COMPLEX (GERITOL ORAL) Take by mouth.    lancets Misc 1 lancet by Misc.(Non-Drug; Combo Route) route 4 (four) times daily. For Free Style Meter    latanoprost (XALATAN) 0.005 % ophthalmic solution Place 1 drop into the left eye every evening.    lisinopril (PRINIVIL,ZESTRIL) 5 MG tablet Take 1 tablet (5 mg total) by mouth once daily.    pantoprazole (PROTONIX) 40 MG tablet TAKE 1 TABLET BY MOUTH EVERY DAY FOR ACID REFLUX    potassium chloride SA (K-DUR,KLOR-CON) 10 MEQ tablet TAKE 1 TABLET BY MOUTH EVERY DAY    simvastatin (ZOCOR) 10 MG tablet Take 1 tablet (10 mg total) by mouth every evening.     No current facility-administered medications on file prior to visit.        Review of Systems   Constitution: Positive for weight gain ( +7 lbs). Negative for decreased appetite, weakness, malaise/fatigue and weight loss.   HENT: Negative for nosebleeds.    Cardiovascular: Negative for chest pain, claudication, dyspnea on exertion, irregular heartbeat, leg swelling, near-syncope, orthopnea, palpitations, paroxysmal nocturnal dyspnea and syncope.   Respiratory: Positive for shortness of breath. Negative for cough, sleep disturbances due to breathing, snoring and wheezing.         Uses CPAP nightly    Hematologic/Lymphatic: Negative for bleeding problem. Does not bruise/bleed easily.   Skin: Negative for rash.   Musculoskeletal: Negative for arthritis, back pain, falls, joint pain, joint swelling, muscle cramps, muscle weakness and myalgias.   Gastrointestinal: Negative for bloating, abdominal pain, constipation, diarrhea, heartburn, nausea and vomiting.   Genitourinary: Negative for dysuria, hematuria and nocturia.   Neurological: Negative for excessive " "daytime sleepiness, dizziness, light-headedness, loss of balance, numbness, paresthesias and vertigo.       Objective:   Physical Exam   Constitutional: He is oriented to person, place, and time. He appears well-developed and well-nourished.   Neck: Neck supple. No JVD present.   Cardiovascular: Normal rate, regular rhythm, normal heart sounds and normal pulses.  Exam reveals no friction rub.    No murmur heard.  Pulmonary/Chest: Effort normal. No respiratory distress. He has wheezes (faint). He has rales in the right lower field and the left lower field.   Abdominal: Soft. Bowel sounds are normal. He exhibits no distension.   LLQ colostomy      Musculoskeletal: He exhibits edema ( +1 BLE ). He exhibits no tenderness.   Neurological: He is alert and oriented to person, place, and time.   Skin: Skin is warm and dry. No rash noted.   Psychiatric: He has a normal mood and affect. His behavior is normal.   Nursing note and vitals reviewed.    Vitals:    12/28/17 1145   BP: 126/80   BP Location: Right arm   Patient Position: Sitting   BP Method: Medium (Manual)   Pulse: 64   Weight: 104.9 kg (231 lb 4.2 oz)   Height: 5' 8" (1.727 m)     Lab Results   Component Value Date    CHOL 112 (L) 04/20/2017    CHOL 107 (L) 11/17/2016    CHOL 105 (L) 05/17/2016     Lab Results   Component Value Date    HDL 39 (L) 04/20/2017    HDL 31 (L) 11/17/2016    HDL 20 (L) 05/17/2016     Lab Results   Component Value Date    LDLCALC 58.6 (L) 04/20/2017    LDLCALC 58.2 (L) 11/17/2016    LDLCALC 60.0 (L) 05/17/2016     Lab Results   Component Value Date    TRIG 72 04/20/2017    TRIG 89 11/17/2016    TRIG 125 05/17/2016     Lab Results   Component Value Date    CHOLHDL 34.8 04/20/2017    CHOLHDL 29.0 11/17/2016    CHOLHDL 19.0 (L) 05/17/2016       Chemistry        Component Value Date/Time     12/21/2017 0940    K 4.1 12/21/2017 0940     12/21/2017 0940    CO2 20 (L) 12/21/2017 0940    BUN 38 (H) 12/21/2017 0940    CREATININE 2.3 (H) " 12/21/2017 0940     (H) 12/21/2017 0940        Component Value Date/Time    CALCIUM 8.9 12/21/2017 0940    ALKPHOS 92 12/21/2017 0940    AST 39 12/21/2017 0940    ALT 22 12/21/2017 0940    BILITOT 1.2 (H) 12/21/2017 0940    ESTGFRAFRICA 33 (A) 12/21/2017 0940    EGFRNONAA 29 (A) 12/21/2017 0940          Lab Results   Component Value Date    TSH 4.317 (H) 05/17/2016     Lab Results   Component Value Date    INR 1.3 (H) 12/21/2017    INR 1.1 04/19/2017    INR 1.4 (H) 03/22/2016     Lab Results   Component Value Date    WBC 6.43 12/21/2017    HGB 14.1 12/21/2017    HCT 43.2 12/21/2017    MCV 89 12/21/2017     (L) 12/21/2017     BMP  Sodium   Date Value Ref Range Status   12/21/2017 139 136 - 145 mmol/L Final     Potassium   Date Value Ref Range Status   12/21/2017 4.1 3.5 - 5.1 mmol/L Final     Chloride   Date Value Ref Range Status   12/21/2017 106 95 - 110 mmol/L Final     CO2   Date Value Ref Range Status   12/21/2017 20 (L) 23 - 29 mmol/L Final     BUN, Bld   Date Value Ref Range Status   12/21/2017 38 (H) 8 - 23 mg/dL Final     Creatinine   Date Value Ref Range Status   12/21/2017 2.3 (H) 0.5 - 1.4 mg/dL Final     Calcium   Date Value Ref Range Status   12/21/2017 8.9 8.7 - 10.5 mg/dL Final     Anion Gap   Date Value Ref Range Status   12/21/2017 13 8 - 16 mmol/L Final     eGFR if    Date Value Ref Range Status   12/21/2017 33 (A) >60 mL/min/1.73 m^2 Final     eGFR if non    Date Value Ref Range Status   12/21/2017 29 (A) >60 mL/min/1.73 m^2 Final     Comment:     Calculation used to obtain the estimated glomerular filtration  rate (eGFR) is the CKD-EPI equation.        CrCl cannot be calculated (Patient's most recent lab result is older than the maximum 7 days allowed.).    Assessment:     1. CHF NYHA class II, unspecified failure chronicity, combined    2. Pulmonary HTN    3. CAD, multiple vessel    4. Biventricular ICD (implantable cardioverter-defibrillator) in  place    5. Hypertension associated with diabetes    6. Ischemic cardiomyopathy    7. Hyperlipidemia associated with type 2 diabetes mellitus    8. LBBB (left bundle branch block)    9. Stage 3 chronic kidney disease    10. Type 2 diabetes mellitus with stage 3 chronic kidney disease, with long-term current use of insulin    11. Colostomy in place    12. JAHAIRA on CPAP      Using CPAP nightly  BP stable  Renal function stable.   Variable diet compliance  No ICD firing   Stable CAD-No angina  Has been out of combivent x 2 weeks  Weight up 7lbs and has lower extremity edema and rales on exam  Plan:   Needs to fill combivent script  I think he needs to take his Bumex 2mg BID   Check BMP and BNP in 1 week  RTC in 2 months or sooner if needed. I have advised patient to go to the ER for worsening symptoms.

## 2018-01-03 RX ORDER — ALLOPURINOL 100 MG/1
TABLET ORAL
Qty: 90 TABLET | Refills: 0 | Status: SHIPPED | OUTPATIENT
Start: 2018-01-03 | End: 2018-04-24 | Stop reason: SDUPTHER

## 2018-01-11 ENCOUNTER — TELEPHONE (OUTPATIENT)
Dept: CARDIOLOGY | Facility: CLINIC | Age: 63
End: 2018-01-11

## 2018-01-11 ENCOUNTER — LAB VISIT (OUTPATIENT)
Dept: LAB | Facility: HOSPITAL | Age: 63
End: 2018-01-11
Attending: NURSE PRACTITIONER
Payer: COMMERCIAL

## 2018-01-11 DIAGNOSIS — I25.5 ISCHEMIC CARDIOMYOPATHY: ICD-10-CM

## 2018-01-11 LAB
ANION GAP SERPL CALC-SCNC: 7 MMOL/L
BNP SERPL-MCNC: 1683 PG/ML
BUN SERPL-MCNC: 42 MG/DL
CALCIUM SERPL-MCNC: 9.2 MG/DL
CHLORIDE SERPL-SCNC: 102 MMOL/L
CO2 SERPL-SCNC: 30 MMOL/L
CREAT SERPL-MCNC: 2.3 MG/DL
EST. GFR  (AFRICAN AMERICAN): 33 ML/MIN/1.73 M^2
EST. GFR  (NON AFRICAN AMERICAN): 29 ML/MIN/1.73 M^2
GLUCOSE SERPL-MCNC: 147 MG/DL
POTASSIUM SERPL-SCNC: 4.3 MMOL/L
SODIUM SERPL-SCNC: 139 MMOL/L

## 2018-01-11 PROCEDURE — 83880 ASSAY OF NATRIURETIC PEPTIDE: CPT

## 2018-01-11 PROCEDURE — 80048 BASIC METABOLIC PNL TOTAL CA: CPT | Mod: PO

## 2018-01-11 PROCEDURE — 36415 COLL VENOUS BLD VENIPUNCTURE: CPT | Mod: PO

## 2018-01-12 RX ORDER — PANTOPRAZOLE SODIUM 40 MG/1
TABLET, DELAYED RELEASE ORAL
Qty: 30 TABLET | Refills: 4 | Status: SHIPPED | OUTPATIENT
Start: 2018-01-12 | End: 2018-04-17 | Stop reason: SDUPTHER

## 2018-01-12 NOTE — TELEPHONE ENCOUNTER
If I recall, she told me that they have metolazone at home. He can take metolazone , 30 min before morning Bumex x3 days ONLY. Phone review on Monday. Please stress that the is only to take 3 doses. And did he get his inhaler refilled

## 2018-01-12 NOTE — TELEPHONE ENCOUNTER
I spoke with pts wife. She repeated instructions back correctly. Pt will be getting his inhaler refilled this weekend.   phrev is scheduled on Monday to f/u

## 2018-01-16 ENCOUNTER — OFFICE VISIT (OUTPATIENT)
Dept: OPHTHALMOLOGY | Facility: CLINIC | Age: 63
End: 2018-01-16
Payer: COMMERCIAL

## 2018-01-16 DIAGNOSIS — H02.826 CYST OF LEFT EYELID: Primary | ICD-10-CM

## 2018-01-16 PROCEDURE — 99212 OFFICE O/P EST SF 10 MIN: CPT | Mod: S$GLB,,, | Performed by: OPHTHALMOLOGY

## 2018-01-16 PROCEDURE — 99999 PR PBB SHADOW E&M-EST. PATIENT-LVL II: CPT | Mod: PBBFAC,,, | Performed by: OPHTHALMOLOGY

## 2018-01-16 RX ORDER — GENTAMICIN SULFATE 0.3 %
OINTMENT (GRAM) OPHTHALMIC (EYE)
Qty: 1 TUBE | Refills: 0 | Status: SHIPPED | OUTPATIENT
Start: 2018-01-16 | End: 2018-05-18

## 2018-01-16 NOTE — PROGRESS NOTES
"    ===============================  01/16/2018   Yan Choudhury,   62 y.o. male   Last visit [facility]: :12/11/2017   Last visit eye dept. 12/11/2017  [facility]:  Corrected distance visual acuity was NLP in the right eye and 20/40 in the left eye.  Tonometry     Tonometry (Applanation, 11:34 AM)       Right Left    Pressure  19               Not recorded         Not recorded        No chief complaint on file.    Ophthalmic Medications     Ophthalmic-Intraocular Pressure Reducing Agents, Prostaglandin Analogs Start End    latanoprost (XALATAN) 0.005 % ophthalmic solution 12/11/2017 12/11/2018    Sig: Place 1 drop into the left eye every evening.    Route: Left Eye         HPI     --DM since 2000  --H/O BDR and DME OS  H/o Av and Shiva 2014  --Focal OS June 2011  --VRTX/VMA OS "would not expect improvement with treatment secondary to   VRTX"  --Prosthesis OD secondary to trauma  --COAG   xalatan os  Tmax 25  (-1)  Gonio Open  Low RNFL  Last VF Bjerrum OS 3/2016  HV[facility] 12/11/17    Last edited by Estella Forbes on 1/16/2018 10:06 AM. (History)          ________________  1/16/2018  Problem List Items Addressed This Visit     None      Visit Diagnoses     Cyst of left eyelid    -  Primary          .drained  Left medial canthal inclsion cyst   had a  squamous papilaom look interbnally    When if recurrs rec a more formal excsion         ===========================    "

## 2018-01-19 ENCOUNTER — TELEPHONE (OUTPATIENT)
Dept: CARDIOLOGY | Facility: CLINIC | Age: 63
End: 2018-01-19

## 2018-01-19 DIAGNOSIS — I50.9 ACUTE ON CHRONIC CONGESTIVE HEART FAILURE, UNSPECIFIED CONGESTIVE HEART FAILURE TYPE: ICD-10-CM

## 2018-01-19 DIAGNOSIS — I50.9 CONGESTIVE HEART FAILURE, UNSPECIFIED CONGESTIVE HEART FAILURE CHRONICITY, UNSPECIFIED CONGESTIVE HEART FAILURE TYPE: Primary | ICD-10-CM

## 2018-01-19 RX ORDER — BUMETANIDE 2 MG/1
2 TABLET ORAL 2 TIMES DAILY
Qty: 75 TABLET | Refills: 11 | Status: SHIPPED | OUTPATIENT
Start: 2018-01-19 | End: 2018-01-19 | Stop reason: SDUPTHER

## 2018-01-19 RX ORDER — BUMETANIDE 2 MG/1
2 TABLET ORAL 2 TIMES DAILY
Qty: 75 TABLET | Refills: 11 | Status: SHIPPED | OUTPATIENT
Start: 2018-01-19 | End: 2019-01-31

## 2018-01-19 NOTE — TELEPHONE ENCOUNTER
The patient has been notified of this information and all questions answered.  Pt will go in am for labs.   She is aware we will call Monday for results and any further orders.

## 2018-01-19 NOTE — TELEPHONE ENCOUNTER
----- Message from Bruna Velez sent at 1/19/2018  8:35 AM CST -----  Contact: Pt Spouse  1. What is the name of the medication you are requesting? Bumex (Early Refill)  2. What is the dose? 2 mg  3. How do you take the medication? Orally, topically, etc? Orally  4. How often do you take this medication? One twice a day  5. Do you need a 30 day or 90 day supply? Per Dr  6. How many refills are you requesting? Per   7. What is your preferred pharmacy and location of the pharmacy?  [facility]/pharmacy #[id number] - [place] - [address] AT AT [facility]  [address]  [place] [address]  Phone: [phone] Fax: [phone]  8. Who can we contact with further questions? Pt Spouse [phone]

## 2018-01-19 NOTE — TELEPHONE ENCOUNTER
I spoke with pts wife  She reports pts Left leg still swollen.   Started coughing back last night. Otherwise doing pretty good most of this week.coughing up sputem red/dark. Was clear/yellow at last phone check 1/11.  No abd swelling.   Inhaler was filled and pt has been using it.   Still having good increase in UOP with bumex 2mg bid  Pt only took metolazone x 3 doses as instructed.  He has good UOP but wt is the same.  Wt stable at 232lbs.

## 2018-01-20 ENCOUNTER — LAB VISIT (OUTPATIENT)
Dept: LAB | Facility: HOSPITAL | Age: 63
End: 2018-01-20
Attending: NURSE PRACTITIONER
Payer: COMMERCIAL

## 2018-01-20 DIAGNOSIS — I50.9 CONGESTIVE HEART FAILURE, UNSPECIFIED CONGESTIVE HEART FAILURE CHRONICITY, UNSPECIFIED CONGESTIVE HEART FAILURE TYPE: ICD-10-CM

## 2018-01-20 LAB
ANION GAP SERPL CALC-SCNC: 10 MMOL/L
BNP SERPL-MCNC: 1020 PG/ML
BUN SERPL-MCNC: 39 MG/DL
CALCIUM SERPL-MCNC: 9.4 MG/DL
CHLORIDE SERPL-SCNC: 99 MMOL/L
CO2 SERPL-SCNC: 31 MMOL/L
CREAT SERPL-MCNC: 2 MG/DL
EST. GFR  (AFRICAN AMERICAN): 39.6 ML/MIN/1.73 M^2
EST. GFR  (NON AFRICAN AMERICAN): 34.3 ML/MIN/1.73 M^2
GLUCOSE SERPL-MCNC: 90 MG/DL
POTASSIUM SERPL-SCNC: 4.1 MMOL/L
SODIUM SERPL-SCNC: 140 MMOL/L

## 2018-01-20 PROCEDURE — 80048 BASIC METABOLIC PNL TOTAL CA: CPT

## 2018-01-20 PROCEDURE — 36415 COLL VENOUS BLD VENIPUNCTURE: CPT | Mod: PO

## 2018-01-20 PROCEDURE — 83880 ASSAY OF NATRIURETIC PEPTIDE: CPT

## 2018-01-22 ENCOUNTER — TELEPHONE (OUTPATIENT)
Dept: CARDIOLOGY | Facility: CLINIC | Age: 63
End: 2018-01-22

## 2018-01-22 ENCOUNTER — TELEPHONE (OUTPATIENT)
Dept: PULMONOLOGY | Facility: CLINIC | Age: 63
End: 2018-01-22

## 2018-01-22 DIAGNOSIS — N18.30 STAGE 3 CHRONIC KIDNEY DISEASE: Primary | ICD-10-CM

## 2018-01-22 NOTE — TELEPHONE ENCOUNTER
Spoke with pt and pt wife notified both information pt wife wrote down instructions and repeated back. I scheduled pt for repeat labs 2/5/18 appt letter mailed.

## 2018-01-22 NOTE — TELEPHONE ENCOUNTER
----- Message from Desirae Perry sent at 1/22/2018 12:02 PM CST -----  Jessica Choudhury (Spouse) is requesting a call from nurse to discuss questions and concerns regarding the apt.        Please call Jessica Choudhury (Spouse) back at [phone]

## 2018-01-22 NOTE — TELEPHONE ENCOUNTER
BNP down. Renal function stable. I think he needs to take Metolazone on MWF, 30 min prior to his morning Bumex dose. Continue Bumex 2mg BID. We will need to check BMP in 2 weeks

## 2018-01-24 ENCOUNTER — PROCEDURE VISIT (OUTPATIENT)
Dept: PULMONOLOGY | Facility: CLINIC | Age: 63
End: 2018-01-24
Payer: COMMERCIAL

## 2018-01-24 DIAGNOSIS — J96.11 CHRONIC RESPIRATORY FAILURE WITH HYPOXIA: ICD-10-CM

## 2018-01-24 PROCEDURE — 94762 N-INVAS EAR/PLS OXIMTRY CONT: CPT | Mod: S$GLB,,, | Performed by: INTERNAL MEDICINE

## 2018-01-26 ENCOUNTER — LAB VISIT (OUTPATIENT)
Dept: LAB | Facility: HOSPITAL | Age: 63
End: 2018-01-26
Attending: INTERNAL MEDICINE
Payer: COMMERCIAL

## 2018-01-26 ENCOUNTER — TELEPHONE (OUTPATIENT)
Dept: PULMONOLOGY | Facility: CLINIC | Age: 63
End: 2018-01-26

## 2018-01-26 DIAGNOSIS — N18.30 CKD (CHRONIC KIDNEY DISEASE) STAGE 3, GFR 30-59 ML/MIN: ICD-10-CM

## 2018-01-26 LAB
ALBUMIN SERPL BCP-MCNC: 2.6 G/DL
ANION GAP SERPL CALC-SCNC: 10 MMOL/L
BUN SERPL-MCNC: 53 MG/DL
CALCIUM SERPL-MCNC: 9.3 MG/DL
CHLORIDE SERPL-SCNC: 99 MMOL/L
CO2 SERPL-SCNC: 30 MMOL/L
CREAT SERPL-MCNC: 2.3 MG/DL
EST. GFR  (AFRICAN AMERICAN): 33.4 ML/MIN/1.73 M^2
EST. GFR  (NON AFRICAN AMERICAN): 28.9 ML/MIN/1.73 M^2
GLUCOSE SERPL-MCNC: 188 MG/DL
PHOSPHATE SERPL-MCNC: 3.1 MG/DL
POTASSIUM SERPL-SCNC: 4.1 MMOL/L
PTH-INTACT SERPL-MCNC: 122 PG/ML
SODIUM SERPL-SCNC: 139 MMOL/L

## 2018-01-26 PROCEDURE — 80069 RENAL FUNCTION PANEL: CPT

## 2018-01-26 PROCEDURE — 36415 COLL VENOUS BLD VENIPUNCTURE: CPT | Mod: PO

## 2018-01-26 PROCEDURE — 83970 ASSAY OF PARATHORMONE: CPT

## 2018-01-26 NOTE — TELEPHONE ENCOUNTER
----- Message from Gillian Willard sent at 1/26/2018  1:34 PM CST -----  Contact: Jessica Choudhury (Spouse)  Jessica called and stated she needed to speak to the nurse. She stated that the pt could not make his appt at 1 pm and needs to be squeezed into the schedule as soon as possible. She can be reached at [phone].    Thanks,  TF

## 2018-02-02 ENCOUNTER — OFFICE VISIT (OUTPATIENT)
Dept: NEPHROLOGY | Facility: CLINIC | Age: 63
End: 2018-02-02
Payer: COMMERCIAL

## 2018-02-02 VITALS
HEART RATE: 68 BPM | DIASTOLIC BLOOD PRESSURE: 84 MMHG | SYSTOLIC BLOOD PRESSURE: 122 MMHG | BODY MASS INDEX: 34.01 KG/M2 | WEIGHT: 224.44 LBS | HEIGHT: 68 IN

## 2018-02-02 DIAGNOSIS — N18.30 CKD (CHRONIC KIDNEY DISEASE) STAGE 3, GFR 30-59 ML/MIN: Primary | ICD-10-CM

## 2018-02-02 DIAGNOSIS — E11.29 PROTEINURIA DUE TO TYPE 2 DIABETES MELLITUS: ICD-10-CM

## 2018-02-02 DIAGNOSIS — R80.9 PROTEINURIA DUE TO TYPE 2 DIABETES MELLITUS: ICD-10-CM

## 2018-02-02 PROCEDURE — 3008F BODY MASS INDEX DOCD: CPT | Mod: S$GLB,,, | Performed by: INTERNAL MEDICINE

## 2018-02-02 PROCEDURE — 99999 PR PBB SHADOW E&M-EST. PATIENT-LVL III: CPT | Mod: PBBFAC,,, | Performed by: INTERNAL MEDICINE

## 2018-02-02 PROCEDURE — 99214 OFFICE O/P EST MOD 30 MIN: CPT | Mod: S$GLB,,, | Performed by: INTERNAL MEDICINE

## 2018-02-02 NOTE — PROGRESS NOTES
PROGRESS NOTE FOR ESTABLISHED PATIENT    PHYSICIAN REQUESTING THE CONSULT: Dr. Sandra Estevez    REASON FOR VISIT: Renal insufficiency    62 y.o. male with history of CKD 3, HTN, CHF, anemia, DM2, CAD, pulmonary hypertension presents to the renal clinic for evaluation of renal insufficiency.       Patient reports SOB with exercise and mild LE edema. Bumex dose has remained unchanged.         Past Medical History:   Diagnosis Date    Arthritis     CAD (coronary artery disease)     Cataract     CHF (congestive heart failure)     Dr Calvillo    Chronic combined systolic and diastolic congestive heart failure 3/24/2015    CKD (chronic kidney disease), stage III     Diabetes mellitus type II      AM    Diabetic retinopathy     anti Veg F injections for macular edema    Diverticulitis of colon     ED (erectile dysfunction)     Glaucoma     HTN (hypertension)     Hyperlipidemia     Ischemic cardiomyopathy     Obesity     Pulmonary HTN        Past Surgical History:   Procedure Laterality Date    CARDIAC CATHETERIZATION  2009    CHOLECYSTECTOMY      COLONOSCOPY      EYE SURGERY      KNEE SURGERY         Review of patient's allergies indicates:  No Known Allergies    Current Outpatient Prescriptions   Medication Sig Dispense Refill    allopurinol (ZYLOPRIM) 100 MG tablet TAKE 1 TABLET (100 MG TOTAL) BY MOUTH ONCE DAILY. 90 tablet 0    aspirin (ECOTRIN) 81 MG EC tablet Take 81 mg by mouth once daily.      BD INSULIN SYRINGE ULTRA-FINE 0.5 mL 31 gauge x 5/16 Syrg USE AS DIRECTED TO INJECT INSULIN TWO TIMES DAILY 60 each 11    blood sugar diagnostic Strp 1 strip by Misc.(Non-Drug; Combo Route) route 4 (four) times daily. For Free Style Meter 120 strip 11    bumetanide (BUMEX) 2 MG tablet Take 1 tablet (2 mg total) by mouth 2 (two) times daily. 75 tablet 11    carvedilol (COREG) 25 MG tablet Take 0.5 tablets (12.5 mg total) by mouth 2 (two) times daily with meals. Takes one-half tablet by mouth twice a  "day 30 tablet 11    gentamicin (GARAMYCIN) 0.3 % (3 mg/gram) ophthalmic ointment Apply to area b.i.d. X 7 days 1 Tube 0    guaifenesin (MUCINEX) 600 mg 12 hr tablet Take 1,200 mg by mouth 2 (two) times daily as needed.       insulin glargine (LANTUS) 100 unit/mL injection 50 units bid 30 mL 11    insulin needles, disposable, (BD ULTRA-FINE JOSLYN PEN NEEDLES) 32 x 5/32 " Ndle 1 each by Misc.(Non-Drug; Combo Route) route 4 (four) times daily. 100 each 11    ipratropium-albuterol (COMBIVENT)  mcg/actuation inhaler Inhale 1 puff into the lungs every 6 (six) hours as needed for Wheezing or Shortness of Breath. Rescue 1 Package 0    IRON/VITAMIN B COMPLEX (GERITOL ORAL) Take by mouth.      lancets Misc 1 lancet by Misc.(Non-Drug; Combo Route) route 4 (four) times daily. For Free Style Meter 120 each 11    latanoprost (XALATAN) 0.005 % ophthalmic solution Place 1 drop into the left eye every evening. 2.5 mL 4    lisinopril (PRINIVIL,ZESTRIL) 5 MG tablet Take 1 tablet (5 mg total) by mouth once daily. 90 tablet 3    pantoprazole (PROTONIX) 40 MG tablet TAKE 1 TABLET BY MOUTH EVERY DAY FOR ACID REFLUX 30 tablet 4    potassium chloride SA (K-DUR,KLOR-CON) 10 MEQ tablet TAKE 1 TABLET BY MOUTH EVERY DAY 90 tablet 2    simvastatin (ZOCOR) 10 MG tablet Take 1 tablet (10 mg total) by mouth every evening. 30 tablet 11     No current facility-administered medications for this visit.        Family History   Problem Relation Age of Onset    Cancer Mother     Heart disease Father     Stroke Father     No Known Problems Sister     No Known Problems Brother     No Known Problems Maternal Aunt     No Known Problems Maternal Uncle     No Known Problems Paternal Aunt     No Known Problems Paternal Uncle     No Known Problems Maternal Grandmother     No Known Problems Maternal Grandfather     No Known Problems Paternal Grandmother     No Known Problems Paternal Grandfather     Amblyopia Neg Hx     Blindness " "Neg Hx     Cataracts Neg Hx     Diabetes Neg Hx     Glaucoma Neg Hx     Hypertension Neg Hx     Macular degeneration Neg Hx     Retinal detachment Neg Hx     Strabismus Neg Hx     Thyroid disease Neg Hx        Social History     Social History    Marital status:      Spouse name: N/A    Number of children: 2    Years of education: N/A     Occupational History    [employer]     Social History Main Topics    Smoking status: Never Smoker    Smokeless tobacco: Never Used    Alcohol use 0.0 oz/week      Comment: " once in awhile...special occassions"    Drug use: No    Sexual activity: Not Currently     Other Topics Concern    Not on file     Social History Narrative    . Lives with spouse. Has 2 children. 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       Review of Systems:  1. GENERAL: patient denies any fever, weight changes, generalized weakness, dizziness.  2. HEENT: patient denies headaches, visual disturbances, swallowing problems, sinus pain, nasal congestion.  3. CARDIOVASCULAR: patient denies chest pain, palpitations.  4. PULMONARY: patient reports SOB with exercise, no coughing, hemoptysis, wheezing.  5. GASTROINTESTINAL: patient denies abdominal pain, nausea, vomiting, diarrhea.  6. GENITOURINARY: patient denies dysuria, hematuria, hesitancy, frequency.  7. EXTREMITIES: patient reports mild LE edema, no LE cramping.  8. DERMATOLOGY: patient denies rashes, ulcers.  9. NEURO: patient denies tremors, extremity weakness, extremity numbness/tingling.  10. MUSCULOSKELETAL: patient denies joint pain, joint swelling.  11. HEMATOLOGY: patient denies rectal or gum bleeding.  12: PSYCH: patient denies anxiety, depression.      PHYSICAL EXAM:  /84   Pulse 68   Ht 5' 8" (1.727 m)   Wt 101.8 kg (224 lb 6.9 oz)   BMI 34.12 kg/m²     GENERAL: Pleasant gentleman presents to clinic with non-labored breathing.  HEENT: PER, no nasal " discharge, no icterus, no oral exudates, moist mucosal membranes.  NECK: no thyroid mass, no lymphadenopathy.  HEART: RRR S1/S2, no rubs, good peripheral pulses.  LUNGS: CTA bilaterally, no wheezing, breathing is nonlabored.  ABDOMEN: soft, nontender, not distended, bowel sounds are present, no abdominal hernia, colostomy in place  EXTREM: Trace LE edema.  SKIN: no rashes, skin is warm and dry.  NEURO: A & O x 3, no obvious focal signs.    LABORATORY RESULTS:    Lab Results   Component Value Date    CREATININE 2.3 (H) 01/26/2018    BUN 53 (H) 01/26/2018     01/26/2018    K 4.1 01/26/2018    CL 99 01/26/2018    CO2 30 (H) 01/26/2018      Lab Results   Component Value Date    .0 (H) 01/26/2018    CALCIUM 9.3 01/26/2018    PHOS 3.1 01/26/2018     Lab Results   Component Value Date    ALBUMIN 2.6 (L) 01/26/2018     Lab Results   Component Value Date    WBC 6.43 12/21/2017    HGB 14.1 12/21/2017    HCT 43.2 12/21/2017    MCV 89 12/21/2017     (L) 12/21/2017     Urinalysis: + 2 protein, 6 RBC (10/25/17)      ASSESSMENT AND PLAN:  62 y.o. male with history of CKD 3, HTN, CHF, anemia, DM2, CAD, pulmonary hypertension presents to the renal clinic for evaluation of renal insufficiency.     1. Renal insufficiency: Patient presents with renal insufficiency, consistent with CKD stage 3/4. Patient likely suffers from cardiorenal syndrome and creatinine has been fluctuating (in addition to diabetic nephropathy). His creatinine has remained stable at 2.3. Patient's renal function will be monitored closely and he will return to the clinic in 4 months for follow up. Patient is on fluid restrictions (about 40-50 ounces per day).     2. Electrolytes: Within normal limits.    3. Acid base status: No acute issues.    4. Volume: Mild LE edema. Continue Bumex.     5. Hypertension: Good BP control.     6. Medications: Reviewed. Agree with current medical regimen. Continue Lisinopril.    7. Mild anemia: monitor.    8.  DM2: well controlled with last HgA1c at 6.4 (10/25/17).

## 2018-02-05 ENCOUNTER — LAB VISIT (OUTPATIENT)
Dept: LAB | Facility: HOSPITAL | Age: 63
End: 2018-02-05
Attending: NURSE PRACTITIONER
Payer: COMMERCIAL

## 2018-02-05 ENCOUNTER — TELEPHONE (OUTPATIENT)
Dept: INTERNAL MEDICINE | Facility: CLINIC | Age: 63
End: 2018-02-05

## 2018-02-05 ENCOUNTER — TELEPHONE (OUTPATIENT)
Dept: CARDIOLOGY | Facility: CLINIC | Age: 63
End: 2018-02-05

## 2018-02-05 DIAGNOSIS — N18.30 STAGE 3 CHRONIC KIDNEY DISEASE: ICD-10-CM

## 2018-02-05 LAB
ANION GAP SERPL CALC-SCNC: 8 MMOL/L
BUN SERPL-MCNC: 41 MG/DL
CALCIUM SERPL-MCNC: 9.1 MG/DL
CHLORIDE SERPL-SCNC: 104 MMOL/L
CO2 SERPL-SCNC: 24 MMOL/L
CREAT SERPL-MCNC: 2.1 MG/DL
EST. GFR  (AFRICAN AMERICAN): 37 ML/MIN/1.73 M^2
EST. GFR  (NON AFRICAN AMERICAN): 32 ML/MIN/1.73 M^2
GLUCOSE SERPL-MCNC: 225 MG/DL
POTASSIUM SERPL-SCNC: 4.4 MMOL/L
SODIUM SERPL-SCNC: 136 MMOL/L

## 2018-02-05 PROCEDURE — 36415 COLL VENOUS BLD VENIPUNCTURE: CPT | Mod: PO

## 2018-02-05 PROCEDURE — 80048 BASIC METABOLIC PNL TOTAL CA: CPT | Mod: PO

## 2018-02-05 NOTE — TELEPHONE ENCOUNTER
Pt is requesting refill on colostomy bags.  Please advise      they need it printed and faxed to listed fax number

## 2018-02-05 NOTE — TELEPHONE ENCOUNTER
He is to continue his current regimen Bumex 2mg BID and metolazone ONLY on MWF. Make sure he is taking K+ supplement as well.

## 2018-02-05 NOTE — TELEPHONE ENCOUNTER
I spoke with Mrs. Lopez, pts wife. She reports pt has not been having any worsening sob for the last week.   No pnd or orthopnea.   No swelling in legs or stomach.   I let her know I would notify the provider

## 2018-02-05 NOTE — TELEPHONE ENCOUNTER
----- Message from Aida Rollins sent at 2/5/2018 11:44 AM CST -----  Contact: wife/Jessica  Please call pt wife  @ [phone] regarding pt supplies, [facility]/[phone], need prior authorization for pt supplies.

## 2018-02-20 ENCOUNTER — CLINICAL SUPPORT (OUTPATIENT)
Dept: CARDIOLOGY | Facility: CLINIC | Age: 63
End: 2018-02-20
Attending: INTERNAL MEDICINE
Payer: COMMERCIAL

## 2018-02-20 DIAGNOSIS — I50.42 CHRONIC COMBINED SYSTOLIC AND DIASTOLIC HEART FAILURE: ICD-10-CM

## 2018-02-20 DIAGNOSIS — I25.5 ISCHEMIC CARDIOMYOPATHY: ICD-10-CM

## 2018-02-20 DIAGNOSIS — Z95.810 ICD (IMPLANTABLE CARDIOVERTER-DEFIBRILLATOR) IN PLACE: ICD-10-CM

## 2018-02-20 PROCEDURE — 93296 REM INTERROG EVL PM/IDS: CPT | Mod: S$GLB,,, | Performed by: INTERNAL MEDICINE

## 2018-02-20 PROCEDURE — 93295 DEV INTERROG REMOTE 1/2/MLT: CPT | Mod: S$GLB,,, | Performed by: INTERNAL MEDICINE

## 2018-02-20 PROCEDURE — 93297 REM INTERROG DEV EVAL ICPMS: CPT | Mod: S$GLB,,, | Performed by: INTERNAL MEDICINE

## 2018-02-24 DIAGNOSIS — I10 ESSENTIAL HYPERTENSION: ICD-10-CM

## 2018-02-24 RX ORDER — LISINOPRIL 5 MG/1
5 TABLET ORAL DAILY
Qty: 90 TABLET | Refills: 3 | Status: ON HOLD | OUTPATIENT
Start: 2018-02-24 | End: 2019-04-03 | Stop reason: SDUPTHER

## 2018-03-06 ENCOUNTER — OFFICE VISIT (OUTPATIENT)
Dept: CARDIOLOGY | Facility: CLINIC | Age: 63
End: 2018-03-06
Payer: COMMERCIAL

## 2018-03-06 VITALS
HEIGHT: 68 IN | HEART RATE: 96 BPM | DIASTOLIC BLOOD PRESSURE: 78 MMHG | SYSTOLIC BLOOD PRESSURE: 118 MMHG | WEIGHT: 218.06 LBS | BODY MASS INDEX: 33.05 KG/M2

## 2018-03-06 DIAGNOSIS — I27.20 PULMONARY HTN: ICD-10-CM

## 2018-03-06 DIAGNOSIS — I15.2 HYPERTENSION ASSOCIATED WITH DIABETES: ICD-10-CM

## 2018-03-06 DIAGNOSIS — I25.10 CAD, MULTIPLE VESSEL: Primary | Chronic | ICD-10-CM

## 2018-03-06 DIAGNOSIS — I44.7 LBBB (LEFT BUNDLE BRANCH BLOCK): ICD-10-CM

## 2018-03-06 DIAGNOSIS — Z95.810 BIVENTRICULAR ICD (IMPLANTABLE CARDIOVERTER-DEFIBRILLATOR) IN PLACE: Chronic | ICD-10-CM

## 2018-03-06 DIAGNOSIS — E11.59 HYPERTENSION ASSOCIATED WITH DIABETES: ICD-10-CM

## 2018-03-06 DIAGNOSIS — I25.5 ISCHEMIC CARDIOMYOPATHY: ICD-10-CM

## 2018-03-06 DIAGNOSIS — N18.30 TYPE 2 DIABETES MELLITUS WITH STAGE 3 CHRONIC KIDNEY DISEASE, WITH LONG-TERM CURRENT USE OF INSULIN: ICD-10-CM

## 2018-03-06 DIAGNOSIS — I50.42 CHRONIC COMBINED SYSTOLIC AND DIASTOLIC CONGESTIVE HEART FAILURE: ICD-10-CM

## 2018-03-06 DIAGNOSIS — I50.40: Chronic | ICD-10-CM

## 2018-03-06 DIAGNOSIS — G47.33 OSA ON CPAP: Chronic | ICD-10-CM

## 2018-03-06 DIAGNOSIS — E11.22 TYPE 2 DIABETES MELLITUS WITH STAGE 3 CHRONIC KIDNEY DISEASE, WITH LONG-TERM CURRENT USE OF INSULIN: ICD-10-CM

## 2018-03-06 DIAGNOSIS — N18.30 STAGE 3 CHRONIC KIDNEY DISEASE: ICD-10-CM

## 2018-03-06 DIAGNOSIS — Z79.4 TYPE 2 DIABETES MELLITUS WITH STAGE 3 CHRONIC KIDNEY DISEASE, WITH LONG-TERM CURRENT USE OF INSULIN: ICD-10-CM

## 2018-03-06 PROCEDURE — 3078F DIAST BP <80 MM HG: CPT | Mod: S$GLB,,, | Performed by: INTERNAL MEDICINE

## 2018-03-06 PROCEDURE — 3074F SYST BP LT 130 MM HG: CPT | Mod: S$GLB,,, | Performed by: INTERNAL MEDICINE

## 2018-03-06 PROCEDURE — 99214 OFFICE O/P EST MOD 30 MIN: CPT | Mod: S$GLB,,, | Performed by: INTERNAL MEDICINE

## 2018-03-06 PROCEDURE — 99999 PR PBB SHADOW E&M-EST. PATIENT-LVL III: CPT | Mod: PBBFAC,,, | Performed by: INTERNAL MEDICINE

## 2018-03-06 RX ORDER — METOLAZONE 2.5 MG/1
TABLET ORAL
Refills: 12 | COMMUNITY
Start: 2018-02-14 | End: 2018-06-19 | Stop reason: ALTCHOICE

## 2018-03-06 NOTE — PROGRESS NOTES
Subjective:    Patient ID:  Yan Choudhury is a 62 y.o. male who presents for evaluation of Coronary Artery Disease; Hypertension; Hyperlipidemia; and Congestive Heart Failure      HPI Mr. Choudhury returns for f/u.  His current medical conditions include CAD/ICM, DCM, LBBB, HTN, CRI, DM, CRT-ICD,  PTHN, dyslipidemia, obesity. Nonsmoker.   LHC was done 2009 showed diffuse CAD, and severe distal CAD involving apical LAD, distal RCA/distal LCX. Echo 10/10 showed LVEF 25%.   PET stress 2016 showed inferior scar, no significant ischemia noted and LVEF < 35%.   s/p BIV ICD 3/16 for CHF/LBBB.  Here for f/u.  CAD is stable.  Pt reports no active anginal sxs on current medical tx.  CHF is stable. No acute dyspnea.  No pnd.  No LE edema.  BNP is improved.   Echo 12/17 EF 20%, DD, mod PHTN.   Weight is down.  Bumex increased last few months.  Creatinine is improved from last appt.  Mindful of salt and fluid intake.  BP controlled on current meds.  DM and lipids controlled on current meds.  Compliance with meds seems good.      Current Outpatient Prescriptions:     allopurinol (ZYLOPRIM) 100 MG tablet, TAKE 1 TABLET (100 MG TOTAL) BY MOUTH ONCE DAILY., Disp: 90 tablet, Rfl: 0    aspirin (ECOTRIN) 81 MG EC tablet, Take 81 mg by mouth once daily., Disp: , Rfl:     BD INSULIN SYRINGE ULTRA-FINE 0.5 mL 31 gauge x 5/16 Syrg, USE AS DIRECTED TO INJECT INSULIN TWO TIMES DAILY, Disp: 60 each, Rfl: 11    blood sugar diagnostic Strp, 1 strip by Misc.(Non-Drug; Combo Route) route 4 (four) times daily. For Free Style Meter, Disp: 120 strip, Rfl: 11    bumetanide (BUMEX) 2 MG tablet, Take 1 tablet (2 mg total) by mouth 2 (two) times daily., Disp: 75 tablet, Rfl: 11    carvedilol (COREG) 25 MG tablet, Take 0.5 tablets (12.5 mg total) by mouth 2 (two) times daily with meals. Takes one-half tablet by mouth twice a day, Disp: 30 tablet, Rfl: 11    gentamicin (GARAMYCIN) 0.3 % (3 mg/gram) ophthalmic ointment, Apply to area b.i.d. X 7 days,  "Disp: 1 Tube, Rfl: 0    guaifenesin (MUCINEX) 600 mg 12 hr tablet, Take 1,200 mg by mouth 2 (two) times daily as needed. , Disp: , Rfl:     insulin glargine (LANTUS) 100 unit/mL injection, 50 units bid, Disp: 30 mL, Rfl: 11    insulin needles, disposable, (BD ULTRA-FINE JOSLYN PEN NEEDLES) 32 x 5/32 " Ndle, 1 each by Misc.(Non-Drug; Combo Route) route 4 (four) times daily., Disp: 100 each, Rfl: 11    ipratropium-albuterol (COMBIVENT)  mcg/actuation inhaler, Inhale 1 puff into the lungs every 6 (six) hours as needed for Wheezing or Shortness of Breath. Rescue, Disp: 1 Package, Rfl: 0    IRON/VITAMIN B COMPLEX (GERITOL ORAL), Take by mouth., Disp: , Rfl:     lancets Misc, 1 lancet by Misc.(Non-Drug; Combo Route) route 4 (four) times daily. For Free Style Meter, Disp: 120 each, Rfl: 11    latanoprost (XALATAN) 0.005 % ophthalmic solution, Place 1 drop into the left eye every evening., Disp: 2.5 mL, Rfl: 4    lisinopril (PRINIVIL,ZESTRIL) 5 MG tablet, TAKE 1 TABLET (5 MG TOTAL) BY MOUTH ONCE DAILY., Disp: 90 tablet, Rfl: 3    pantoprazole (PROTONIX) 40 MG tablet, TAKE 1 TABLET BY MOUTH EVERY DAY FOR ACID REFLUX, Disp: 30 tablet, Rfl: 4    potassium chloride SA (K-DUR,KLOR-CON) 10 MEQ tablet, TAKE 1 TABLET BY MOUTH EVERY DAY, Disp: 90 tablet, Rfl: 2    simvastatin (ZOCOR) 10 MG tablet, Take 1 tablet (10 mg total) by mouth every evening., Disp: 30 tablet, Rfl: 11    metOLazone (ZAROXOLYN) 2.5 MG tablet, TAKE 1 TABLET (2.5 MG TOTAL) BY MOUTH ONCE DAILY. TAKE 30 MINUTES BEFORE AM DOSE OF BUMEX, Disp: , Rfl: 12      Review of Systems   Constitution: Negative.   HENT: Negative.    Eyes: Negative.    Cardiovascular: Positive for dyspnea on exertion.   Respiratory: Positive for shortness of breath.    Endocrine: Negative.    Hematologic/Lymphatic: Negative.    Skin: Negative.    Musculoskeletal: Negative.    Gastrointestinal: Negative.    Genitourinary: Negative.    Neurological: Negative.  " "  Psychiatric/Behavioral: Negative.    Allergic/Immunologic: Negative.        /78   Pulse 96   Ht 5' 8" (1.727 m)   Wt 98.9 kg (218 lb 0.6 oz)   BMI 33.15 kg/m²   Wt Readings from Last 3 Encounters:   03/06/18 98.9 kg (218 lb 0.6 oz)   02/02/18 101.8 kg (224 lb 6.9 oz)   12/28/17 104.9 kg (231 lb 4.2 oz)     Temp Readings from Last 3 Encounters:   12/21/17 98.7 °F (37.1 °C)   12/04/17 97.4 °F (36.3 °C) (Tympanic)   11/02/17 97.6 °F (36.4 °C) (Oral)     BP Readings from Last 3 Encounters:   03/06/18 118/78   02/02/18 122/84   12/28/17 126/80     Pulse Readings from Last 3 Encounters:   03/06/18 96   02/02/18 68   12/28/17 64          Objective:    Physical Exam   Constitutional: He is oriented to person, place, and time. He appears well-developed and well-nourished.   HENT:   Head: Normocephalic.   Neck: Normal range of motion. Neck supple. Normal carotid pulses, no hepatojugular reflux and no JVD present. Carotid bruit is not present. No thyromegaly present.   Cardiovascular: Normal rate, regular rhythm, S1 normal and S2 normal.  PMI is not displaced.  Exam reveals no S3, no S4, no distant heart sounds, no friction rub, no midsystolic click and no opening snap.    No murmur heard.  Pulses:       Radial pulses are 2+ on the right side, and 2+ on the left side.   Pulmonary/Chest: Effort normal and breath sounds normal. He has no wheezes. He has no rales.   Abdominal: Soft. Bowel sounds are normal. He exhibits no distension, no abdominal bruit, no ascites and no mass. There is no tenderness.   Musculoskeletal: He exhibits no edema.   Neurological: He is alert and oriented to person, place, and time.   Skin: Skin is warm.   Psychiatric: He has a normal mood and affect. His behavior is normal.   Nursing note and vitals reviewed.      I have reviewed all pertinent labs and cardiac studies.      Component      Latest Ref Rng & Units 1/20/2018             BNP      0 - 99 pg/mL 1,020 (H)         Chemistry      "   Component Value Date/Time     02/05/2018 1414    K 4.4 02/05/2018 1414     02/05/2018 1414    CO2 24 02/05/2018 1414    BUN 41 (H) 02/05/2018 1414    CREATININE 2.1 (H) 02/05/2018 1414     (H) 02/05/2018 1414        Component Value Date/Time    CALCIUM 9.1 02/05/2018 1414    ALKPHOS 92 12/21/2017 0940    AST 39 12/21/2017 0940    ALT 22 12/21/2017 0940    BILITOT 1.2 (H) 12/21/2017 0940    ESTGFRAFRICA 37 (A) 02/05/2018 1414    EGFRNONAA 32 (A) 02/05/2018 1414        Lab Results   Component Value Date    WBC 6.43 12/21/2017    HGB 14.1 12/21/2017    HCT 43.2 12/21/2017    MCV 89 12/21/2017     (L) 12/21/2017     Lab Results   Component Value Date    HGBA1C 6.4 (H) 10/25/2017     Lab Results   Component Value Date    CHOL 112 (L) 04/20/2017    CHOL 107 (L) 11/17/2016    CHOL 105 (L) 05/17/2016     Lab Results   Component Value Date    HDL 39 (L) 04/20/2017    HDL 31 (L) 11/17/2016    HDL 20 (L) 05/17/2016     Lab Results   Component Value Date    LDLCALC 58.6 (L) 04/20/2017    LDLCALC 58.2 (L) 11/17/2016    LDLCALC 60.0 (L) 05/17/2016     Lab Results   Component Value Date    TRIG 72 04/20/2017    TRIG 89 11/17/2016    TRIG 125 05/17/2016     Lab Results   Component Value Date    CHOLHDL 34.8 04/20/2017    CHOLHDL 29.0 11/17/2016    CHOLHDL 19.0 (L) 05/17/2016     Date of Procedure: 12/11/2017        TEST DESCRIPTION       General: A catheter is present in the right-sided cardiac chambers.     Aorta: The aortic root is normal in size, measuring 2.9 cm at sinotubular junction and 3.4 cm at Sinuses of Valsalva. The proximal ascending aorta is normal in size, measuring 3.4 cm across.     Left Atrium: The left atrial volume index is severely enlarged, measuring 53.96 cc/m2.     Left Ventricle: The left ventricle is moderately enlarged, with an end-diastolic diameter of 6.2 cm, and an end-systolic diameter of 5.6 cm. LV wall thickness is normal, with the septum and the posterior wall each  measuring 1.1 cm across. Relative wall   thickness was normal at 0.35, and the LV mass index was increased at 166.6 g/m2 consistent with eccentric left ventricular hypertrophy. There is global hypokinesis. Left ventricular systolic function appears severely depressed. Visually estimated   ejection fraction is 20-25%. The LV Doppler derived stroke volume equals 62.0 ccs.     Diastolic indices: E wave velocity 1.2 m/s, E/A ratio 5.2,  msec., E/e' ratio(avg) 18. There is diastolic dysfunction secondary to restrictive abnormality.     Right Atrium: The right atrium is normal in size, measuring 5.5 cm in length and 4.6 cm in width in the apical view.     Right Ventricle: The right ventricle is normal in size measuring 3.6 cm at the base in the apical right ventricle-focused view. Global right ventricular systolic function appears normal. Tricuspid annular plane systolic excursion (TAPSE) is 1.1 cm. The   estimated PA systolic pressure is 48 mmHg.     Aortic Valve:  The aortic valve is moderately sclerotic. Additionally, there is moderate aortic regurgitation. There is a pressure half time of 393.0 msec.     Mitral Valve:  There is trivial to mild mitral regurgitation. There is mitral annular calcification.     Tricuspid Valve:  There is mild to moderate tricuspid regurgitation.     Pulmonary Valve:  There is mild to moderate pulmonic regurgitation.     IVC: IVC is normal in size and collapses > 50% with a sniff, suggesting normal right atrial pressure of 3 mmHg.     Intracavitary: There is no evidence of pericardial effusion, intracavity mass, thrombi, or vegetation.         CONCLUSIONS     1 - Severe left atrial enlargement.     2 - Moderate left ventricular enlargement.     3 - Severely depressed left ventricular systolic function (EF 20-25%).     4 - Restrictive LV filling pattern, indicating markedly elevated LAP (grade 3 diastolic dysfunction).     5 - Normal right ventricular systolic function .     6 -  Pulmonary hypertension. The estimated PA systolic pressure is 48 mmHg.     7 - Moderate aortic regurgitation.     8 - Trivial to mild mitral regurgitation.     9 - Mild to moderate tricuspid regurgitation.     10 - Mild to moderate pulmonic regurgitation.             This document has been electronically    SIGNED BY: Christopher Abrams MD On: 12/11/2017 14:50        Date of Procedure: 02/20/2018    PRE-TEST DATA   DEVICES:  LEAD  [employer] 1458Q/86 LEAD QUARTET CS 86CM S/N:[id number]  Anthony Graham  3/24/2016 - current     LEAD  [employer] 2088TC/52 LEAD TENDRIL 2088TC/52CM S/N:[id number]  Anthony Graham  3/24/2016 - current     CRT-D  [employer] XB0075-56D ICD QUADRA ASSURA CRT-D DF4 S/N:[id number]  Anthony Graham  3/24/2016 - current     LEAD  7122Q/65 LEAD DURATA 7122Q 65 CM S/N:[id number]  Anthony Graham  3/24/2016 - current       The following physician is ANTHONY Irizarry    LV config M3-P4        TEST DESCRIPTION   Follow-Up Analysis:  Chamber type: Multi  Mode: DDD    Lower limit rate is 60 bpm  Upper tracking rate is 120 bpm    Paced AV delay/Sensed AV delay: 160/110 ms  VV delay: LV > RV for 15 ms    TACHY ZONES:  VT1     Rate: > 171 bpm       Monitor therapy  .  VF     Rate: 200 bpm       Available therapies: ATP during charging and Shock.    Estimated longevity: 5-6 years  Cap reform date: 11/18/2017  Charge time: 9.9 sec  HV impedance: 70 Ohm    Mode switch: No  Nonsustained VT: No    LEADS:  RA Lead       P-wave: > 5 mV       Impedance: 400 Ohms       Paced: 7%    RV Lead       R-wave: > 12 mV       Impedance: 490 Ohms       Paced: 99%    LV Lead       R-wave:        Impedance: 850 Ohms       Paced: 99%    THRESHOLDS:  RA Lead     0.875 V at 0.5 ms      Auto threshold, Bipolar  RV Lead     0.5 V at 0.5 ms      Auto threshold, Bipolar  LV Lead     0.875 V at 0.5 ms      Auto threshold, Bipolar    The patient had 0 treated episodes.    Wound  Comments:  Deferred (home transmission only)      Reprogramming Comments:  None - remote transmission only.      General Comments:  Current EGM: AsBp @ 71 bpm.  Battery voltage 75%.  Since last remote on 11/20/2017 - no AMS or HVR.  CorVue currently WNL.  Pt has CHF NYHA class II, unspecified failure chronicity, combined and ischemic cardiomyopathy.  Merlin home transmission of AICD. Device function stable. No Arrhythmias noted. Pt will follow up with HM in 3 months.    Nurse's Comments:  ICD 2 of 3    Interrogation performed by Alee Murphy RN.           This document has been electronically    SIGNED BY: Daniel Graham MD On: 02/21/2018 08:23      Assessment:       1. CAD, multiple vessel    2. Type 2 diabetes mellitus with stage 3 chronic kidney disease, with long-term current use of insulin    3. LBBB (left bundle branch block)    4. Pulmonary HTN    5. Ischemic cardiomyopathy    6. Hypertension associated with diabetes    7. CHF NYHA class II, unspecified failure chronicity, combined    8. Biventricular ICD (implantable cardioverter-defibrillator) in place    9. JAHAIRA on CPAP    10. Stage 3 chronic kidney disease    11. Chronic combined systolic and diastolic congestive heart failure         Plan:             Continue optimal medical tx for severe multivessel CAD/severe CHF.  Continue current meds.  Cardiac low salt discussed  Fluid restriction 50 ounces/daily.  Continue ICD clinic f/u.  Continue f/u with Nephrology.    F/u 3 months.

## 2018-04-16 RX ORDER — LATANOPROST 50 UG/ML
1 SOLUTION/ DROPS OPHTHALMIC DAILY
Qty: 5 ML | Refills: 5 | Status: SHIPPED | OUTPATIENT
Start: 2018-04-16 | End: 2018-05-18 | Stop reason: ALTCHOICE

## 2018-04-17 RX ORDER — PANTOPRAZOLE SODIUM 40 MG/1
TABLET, DELAYED RELEASE ORAL
Qty: 90 TABLET | Refills: 1 | Status: SHIPPED | OUTPATIENT
Start: 2018-04-17 | End: 2018-11-11 | Stop reason: SDUPTHER

## 2018-04-18 ENCOUNTER — PATIENT OUTREACH (OUTPATIENT)
Dept: ADMINISTRATIVE | Facility: HOSPITAL | Age: 63
End: 2018-04-18

## 2018-04-18 NOTE — LETTER
April 18, 2018    Yan Choudhury  [address] [address]             [facility]  [address]  [place] [address]  Phone: [phone] Dear Mr. Choudhury:    [facility] is committed to your overall health.  To help you get the most out of each of your visits, we will review your information to make sure you are up to date on all of your recommended tests and/or procedures.      Sandra Estevez MD has found that you may be due for   Health Maintenance Due   Topic    PROSTATE-SPECIFIC ANTIGEN     Foot Exam     Hemoglobin A1c         If you have had any of the above done at another facility, please bring the records or information with you so that your record at [facility] will be complete.    If you are currently taking medication, please bring it with you to your appointment for review.    We will be happy to assist you with scheduling any necessary appointments or you may contact the [facility] appointment desk at [phone] to schedule at your convenience.     Thank you for choosing [facility] for your healthcare needs,    Ilda MICHELE LPN Care Coordinator  [facility]  [phone]

## 2018-04-24 RX ORDER — ALLOPURINOL 100 MG/1
TABLET ORAL
Qty: 90 TABLET | Refills: 0 | Status: SHIPPED | OUTPATIENT
Start: 2018-04-24 | End: 2018-07-20 | Stop reason: SDUPTHER

## 2018-04-25 RX ORDER — LATANOPROST 50 UG/ML
1 SOLUTION/ DROPS OPHTHALMIC DAILY
Qty: 2.5 ML | Refills: 1 | Status: SHIPPED | OUTPATIENT
Start: 2018-04-25 | End: 2018-05-18

## 2018-05-02 ENCOUNTER — LAB VISIT (OUTPATIENT)
Dept: LAB | Facility: HOSPITAL | Age: 63
End: 2018-05-02
Attending: FAMILY MEDICINE
Payer: MEDICARE

## 2018-05-02 ENCOUNTER — OFFICE VISIT (OUTPATIENT)
Dept: INTERNAL MEDICINE | Facility: CLINIC | Age: 63
End: 2018-05-02
Payer: MEDICARE

## 2018-05-02 VITALS
HEART RATE: 74 BPM | BODY MASS INDEX: 34.17 KG/M2 | OXYGEN SATURATION: 98 % | HEIGHT: 68 IN | TEMPERATURE: 97 F | WEIGHT: 225.5 LBS | SYSTOLIC BLOOD PRESSURE: 122 MMHG | DIASTOLIC BLOOD PRESSURE: 74 MMHG

## 2018-05-02 DIAGNOSIS — I50.42 CHRONIC COMBINED SYSTOLIC AND DIASTOLIC CONGESTIVE HEART FAILURE: ICD-10-CM

## 2018-05-02 DIAGNOSIS — Z79.4 TYPE 2 DIABETES MELLITUS WITH STAGE 3 CHRONIC KIDNEY DISEASE, WITH LONG-TERM CURRENT USE OF INSULIN: ICD-10-CM

## 2018-05-02 DIAGNOSIS — I15.2 HYPERTENSION ASSOCIATED WITH DIABETES: Primary | ICD-10-CM

## 2018-05-02 DIAGNOSIS — E11.59 HYPERTENSION ASSOCIATED WITH DIABETES: ICD-10-CM

## 2018-05-02 DIAGNOSIS — Z95.810 BIVENTRICULAR ICD (IMPLANTABLE CARDIOVERTER-DEFIBRILLATOR) IN PLACE: Chronic | ICD-10-CM

## 2018-05-02 DIAGNOSIS — E55.9 VITAMIN D DEFICIENCY: ICD-10-CM

## 2018-05-02 DIAGNOSIS — N18.30 TYPE 2 DIABETES MELLITUS WITH STAGE 3 CHRONIC KIDNEY DISEASE, WITH LONG-TERM CURRENT USE OF INSULIN: ICD-10-CM

## 2018-05-02 DIAGNOSIS — E11.22 TYPE 2 DIABETES MELLITUS WITH STAGE 3 CHRONIC KIDNEY DISEASE, WITH LONG-TERM CURRENT USE OF INSULIN: ICD-10-CM

## 2018-05-02 DIAGNOSIS — E78.5 HYPERLIPIDEMIA ASSOCIATED WITH TYPE 2 DIABETES MELLITUS: ICD-10-CM

## 2018-05-02 DIAGNOSIS — N18.30 STAGE 3 CHRONIC KIDNEY DISEASE: ICD-10-CM

## 2018-05-02 DIAGNOSIS — M1A.0790 CHRONIC IDIOPATHIC GOUT INVOLVING TOE WITHOUT TOPHUS, UNSPECIFIED LATERALITY: ICD-10-CM

## 2018-05-02 DIAGNOSIS — K21.9 GASTROESOPHAGEAL REFLUX DISEASE, ESOPHAGITIS PRESENCE NOT SPECIFIED: ICD-10-CM

## 2018-05-02 DIAGNOSIS — Z12.5 PROSTATE CANCER SCREENING: ICD-10-CM

## 2018-05-02 DIAGNOSIS — I15.2 HYPERTENSION ASSOCIATED WITH DIABETES: ICD-10-CM

## 2018-05-02 DIAGNOSIS — I27.20 PULMONARY HTN: ICD-10-CM

## 2018-05-02 DIAGNOSIS — E11.69 HYPERLIPIDEMIA ASSOCIATED WITH TYPE 2 DIABETES MELLITUS: ICD-10-CM

## 2018-05-02 DIAGNOSIS — G47.33 OSA ON CPAP: Chronic | ICD-10-CM

## 2018-05-02 DIAGNOSIS — E11.59 HYPERTENSION ASSOCIATED WITH DIABETES: Primary | ICD-10-CM

## 2018-05-02 DIAGNOSIS — B35.1 ONYCHOMYCOSIS OF MULTIPLE TOENAILS WITH TYPE 2 DIABETES MELLITUS: ICD-10-CM

## 2018-05-02 DIAGNOSIS — E66.9 OBESITY (BMI 30.0-34.9): ICD-10-CM

## 2018-05-02 DIAGNOSIS — I25.10 CAD, MULTIPLE VESSEL: Chronic | ICD-10-CM

## 2018-05-02 DIAGNOSIS — E11.69 ONYCHOMYCOSIS OF MULTIPLE TOENAILS WITH TYPE 2 DIABETES MELLITUS: ICD-10-CM

## 2018-05-02 DIAGNOSIS — Z93.3 COLOSTOMY IN PLACE: ICD-10-CM

## 2018-05-02 LAB
ALBUMIN SERPL BCP-MCNC: 2.8 G/DL
ALP SERPL-CCNC: 77 U/L
ALT SERPL W/O P-5'-P-CCNC: 9 U/L
ANION GAP SERPL CALC-SCNC: 7 MMOL/L
AST SERPL-CCNC: 24 U/L
BILIRUB SERPL-MCNC: 0.8 MG/DL
BNP SERPL-MCNC: 504 PG/ML
BUN SERPL-MCNC: 28 MG/DL
CALCIUM SERPL-MCNC: 8.7 MG/DL
CHLORIDE SERPL-SCNC: 102 MMOL/L
CHOLEST SERPL-MCNC: 105 MG/DL
CHOLEST SERPL-MCNC: 105 MG/DL
CHOLEST/HDLC SERPL: 3.8 {RATIO}
CHOLEST/HDLC SERPL: 3.8 {RATIO}
CO2 SERPL-SCNC: 29 MMOL/L
COMPLEXED PSA SERPL-MCNC: 0.82 NG/ML
CREAT SERPL-MCNC: 2.4 MG/DL
EST. GFR  (AFRICAN AMERICAN): 31.8 ML/MIN/1.73 M^2
EST. GFR  (NON AFRICAN AMERICAN): 27.5 ML/MIN/1.73 M^2
ESTIMATED AVG GLUCOSE: 140 MG/DL
GLUCOSE SERPL-MCNC: 241 MG/DL
HBA1C MFR BLD HPLC: 6.5 %
HDLC SERPL-MCNC: 28 MG/DL
HDLC SERPL-MCNC: 28 MG/DL
HDLC SERPL: 26.7 %
HDLC SERPL: 26.7 %
LDLC SERPL CALC-MCNC: 52.8 MG/DL
LDLC SERPL CALC-MCNC: 52.8 MG/DL
NONHDLC SERPL-MCNC: 77 MG/DL
NONHDLC SERPL-MCNC: 77 MG/DL
POTASSIUM SERPL-SCNC: 4.3 MMOL/L
PROT SERPL-MCNC: 6.7 G/DL
SODIUM SERPL-SCNC: 138 MMOL/L
TRIGL SERPL-MCNC: 121 MG/DL
TRIGL SERPL-MCNC: 121 MG/DL
TSH SERPL DL<=0.005 MIU/L-ACNC: 2.47 UIU/ML
URATE SERPL-MCNC: 8.6 MG/DL

## 2018-05-02 PROCEDURE — 83880 ASSAY OF NATRIURETIC PEPTIDE: CPT

## 2018-05-02 PROCEDURE — 83036 HEMOGLOBIN GLYCOSYLATED A1C: CPT

## 2018-05-02 PROCEDURE — 99214 OFFICE O/P EST MOD 30 MIN: CPT | Mod: S$PBB,,, | Performed by: FAMILY MEDICINE

## 2018-05-02 PROCEDURE — 84153 ASSAY OF PSA TOTAL: CPT

## 2018-05-02 PROCEDURE — 84550 ASSAY OF BLOOD/URIC ACID: CPT

## 2018-05-02 PROCEDURE — 99213 OFFICE O/P EST LOW 20 MIN: CPT | Mod: PBBFAC,PO | Performed by: FAMILY MEDICINE

## 2018-05-02 PROCEDURE — 84443 ASSAY THYROID STIM HORMONE: CPT

## 2018-05-02 PROCEDURE — 99999 PR PBB SHADOW E&M-EST. PATIENT-LVL III: CPT | Mod: PBBFAC,,, | Performed by: FAMILY MEDICINE

## 2018-05-02 PROCEDURE — 82306 VITAMIN D 25 HYDROXY: CPT

## 2018-05-02 PROCEDURE — 80061 LIPID PANEL: CPT

## 2018-05-02 PROCEDURE — 36415 COLL VENOUS BLD VENIPUNCTURE: CPT | Mod: PO

## 2018-05-02 PROCEDURE — 80053 COMPREHEN METABOLIC PANEL: CPT

## 2018-05-02 RX ORDER — LANCETS
EACH MISCELLANEOUS
Qty: 120 EACH | Refills: 11 | Status: SHIPPED | OUTPATIENT
Start: 2018-05-02 | End: 2020-06-04 | Stop reason: SDUPTHER

## 2018-05-02 RX ORDER — INSULIN GLARGINE 100 [IU]/ML
INJECTION, SOLUTION SUBCUTANEOUS
Qty: 30 ML | Refills: 11 | Status: SHIPPED | OUTPATIENT
Start: 2018-05-02 | End: 2020-01-09

## 2018-05-02 RX ORDER — PEN NEEDLE, DIABETIC 30 GX3/16"
1 NEEDLE, DISPOSABLE MISCELLANEOUS 4 TIMES DAILY
Qty: 100 EACH | Refills: 11 | Status: SHIPPED | OUTPATIENT
Start: 2018-05-02

## 2018-05-02 RX ORDER — POTASSIUM CHLORIDE 750 MG/1
TABLET, EXTENDED RELEASE ORAL
Qty: 90 TABLET | Refills: 2 | Status: SHIPPED | OUTPATIENT
Start: 2018-05-02 | End: 2018-11-27

## 2018-05-02 NOTE — PROGRESS NOTES
Subjective:       Patient ID: Yan Choudhury is a 62 y.o. male.    Chief Complaint: Follow-up    62-year-old -American male patient with Patient Active Problem List:     Hypertension associated with diabetes     Stage 3 chronic kidney disease     ED (erectile dysfunction)     Ischemic cardiomyopathy     Pulmonary HTN     Hyperlipidemia associated with type 2 diabetes mellitus     Loss of eye - Right Eye     LBBB (left bundle branch block)     Abnormal stress test     Cysts of eyelids     CAD, multiple vessel     Primary open-angle glaucoma, moderate stage     Biventricular ICD (implantable cardioverter-defibrillator) in place     Anemia     Colostomy in place     Type 2 diabetes mellitus with stage 3 chronic kidney disease, with long-term current use of insulin     JAHAIRA on CPAP     Gastroesophageal reflux disease     Chronic gout without tophus     Chronic combined systolic and diastolic congestive heart failure     Obesity (BMI 30.0-34.9)     Vitamin D deficiency  Here for follow-up on chronic medical conditions.  Patient reports that he is been discontinued on Bumex to once daily, denies of any worsening   shortness of breath with exertion, has been using one pillow.  patient denies of any chest pain or shortness of breath , palpitations  Patient has been monitoring his blood glucose levels and has been running in the range of 140s to 160s, usually takes insulin Lantus 50 units twice a day if blood glucose levels stay above 150.  Requesting insulin and supplies today, this morning was 165.  Denies of any polyuria polydipsia polyphagia, nocturia, tingling or numbness sensation to extremities.  Patient has appointment with nephrology soon  Denies of any heart failure exacerbations          Review of Systems   Constitutional: Negative for appetite change and fatigue.   Eyes: Negative for visual disturbance.   Respiratory: Negative for shortness of breath.    Cardiovascular: Negative for chest pain, palpitations  "and leg swelling.   Gastrointestinal: Negative for abdominal pain, nausea and vomiting.   Endocrine: Negative for polydipsia, polyphagia and polyuria.   Musculoskeletal: Negative for myalgias.   Skin: Negative for rash.   Neurological: Negative for weakness, numbness and headaches.   Psychiatric/Behavioral: Negative for sleep disturbance.         /74 (BP Location: Right arm, Patient Position: Sitting)   Pulse 74   Temp 96.7 °F (35.9 °C) (Tympanic)   Ht 5' 8" (1.727 m)   Wt 102.3 kg (225 lb 8.5 oz)   SpO2 98%   BMI 34.29 kg/m²   Objective:      Physical Exam   Constitutional: He is oriented to person, place, and time. He appears well-developed and well-nourished.   HENT:   Head: Normocephalic and atraumatic.   Mouth/Throat: Oropharynx is clear and moist.   Cardiovascular: Normal rate, regular rhythm and normal heart sounds.    No murmur heard.  Pulses:       Dorsalis pedis pulses are 1+ on the right side, and 1+ on the left side.   Pulmonary/Chest: Effort normal and breath sounds normal. He has no wheezes.   Abdominal: Soft. Bowel sounds are normal. There is no tenderness.   Musculoskeletal: He exhibits no edema or tenderness.   Feet:   Right Foot:   Protective Sensation: 10 sites tested. 10 sites sensed.   Skin Integrity: Negative for callus or dry skin.   Left Foot:   Protective Sensation: 10 sites tested. 10 sites sensed.   Skin Integrity: Negative for callus or dry skin.   Neurological: He is alert and oriented to person, place, and time.   Skin: Skin is warm and dry. No rash noted.   Positive for fungal infection and hypertrophic toenails in multiple toenails bilaterally   Psychiatric: He has a normal mood and affect.         Assessment:       1. Hypertension associated with diabetes    2. Stage 3 chronic kidney disease    3. Hyperlipidemia associated with type 2 diabetes mellitus    4. Type 2 diabetes mellitus with stage 3 chronic kidney disease, with long-term current use of insulin    5. Chronic " "idiopathic gout involving toe without tophus, unspecified laterality    6. Chronic combined systolic and diastolic congestive heart failure    7. Pulmonary HTN    8. JAHAIRA on CPAP    9. Biventricular ICD (implantable cardioverter-defibrillator) in place    10. CAD, multiple vessel    11. Colostomy in place    12. Gastroesophageal reflux disease, esophagitis presence not specified    13. Vitamin D deficiency    14. Prostate cancer screening    15. Obesity (BMI 30.0-34.9)        Plan:   Hypertension associated with diabetes  -     Comprehensive metabolic panel; Future; Expected date: 05/02/2018  -     Lipid panel; Future; Expected date: 05/02/2018  -     TSH; Future; Expected date: 05/02/2018  Blood pressure stable today currently on lisinopril 5 mg    Stage 3 chronic kidney disease  -     Comprehensive metabolic panel; Future; Expected date: 05/02/2018  Patient currently followed by nephrology    Hyperlipidemia associated with type 2 diabetes mellitus  -     Lipid panel; Future; Expected date: 05/02/2018  Currently taking simvastatin 10 mg, will check fasting labs    Type 2 diabetes mellitus with stage 3 chronic kidney disease, with long-term current use of insulin  -     Comprehensive metabolic panel; Future; Expected date: 05/02/2018  -     Lipid panel; Future; Expected date: 05/02/2018  -     Hemoglobin A1c; Future; Expected date: 05/02/2018  -     Microalbumin/creatinine urine ratio; Future; Expected date: 05/02/2018  -     insulin glargine (LANTUS) 100 unit/mL injection; 50 units bid prn when BS< 150  Dispense: 30 mL; Refill: 11  -     blood sugar diagnostic Strp; 1 strip by Misc.(Non-Drug; Combo Route) route 4 (four) times daily. Telcare  Dispense: 120 strip; Refill: 11  -     pen needle, diabetic (BD ULTRA-FINE JOSLYN PEN NEEDLE) 32 gauge x 5/32" Ndle; 1 each by Misc.(Non-Drug; Combo Route) route 4 (four) times daily.  Dispense: 100 each; Refill: 11  -     lancets Mis; For tel care  Dispense: 120 each; Refill: " 11  Patient taking Lantus 50 units twice a day only if blood glucose levels are above 150  Refill given on Lantus and supplies as requested  Continue monitoring blood glucose levels closely  Strict lifestyle changes recommended with 1800 ADA low-fat and low-cholesterol diet  Diabetic foot exam done today which is stable  Up-to-date with eye exam    Chronic idiopathic gout involving toe without tophus, unspecified laterality  -     Uric acid; Future; Expected date: 05/02/2018  Stable on allopurinol 100 mg    Chronic combined systolic and diastolic congestive heart failure  -     Brain natriuretic peptide; Future; Expected date: 05/02/2018  Pulmonary HTN  JAHAIRA on CPAP  Biventricular ICD (implantable cardioverter-defibrillator) in place  CAD, multiple vessel    Stable and asymptomatic, followed by cardiology  Currently on aspirin and Bumex carvedilol and metolazone    Colostomy in place  Gastroesophageal reflux disease, esophagitis presence not specified  Stable on pantoprazole 40 mg daily    Vitamin D deficiency  -     Vitamin D; Future; Expected date: 05/02/2018  Will check vitamin D levels and to follow-up with nephrology    Prostate cancer screening  -     PSA, Screening; Future; Expected date: 05/02/2018    Obesity (BMI 30.0-34.9)-lifestyle modifications recommended with diet and exercise to lose weight with BMI 34

## 2018-05-03 ENCOUNTER — TELEPHONE (OUTPATIENT)
Dept: INTERNAL MEDICINE | Facility: CLINIC | Age: 63
End: 2018-05-03

## 2018-05-03 LAB — 25(OH)D3+25(OH)D2 SERPL-MCNC: 15 NG/ML

## 2018-05-03 NOTE — TELEPHONE ENCOUNTER
----- Message from Lindsey Forrester sent at 5/3/2018  2:56 PM CDT -----  Contact: SPOUSE  She's calling in regards to pt Rx being sent over to pharm pls call pt back at [phone]

## 2018-05-04 ENCOUNTER — TELEPHONE (OUTPATIENT)
Dept: INTERNAL MEDICINE | Facility: CLINIC | Age: 63
End: 2018-05-04

## 2018-05-04 NOTE — TELEPHONE ENCOUNTER
Returned call to pt, spoke with wife. States that pharmacy says that they don't have any Rx orders for Lantus for Mr. Choudhury, states they're waiting for Dr. Estevez's orders? Will send message to Dr. Estevez as well as give pharmacy a call and give a call back to pt. SHAKA

## 2018-05-04 NOTE — TELEPHONE ENCOUNTER
Spoke with pt wife. Informed that Dr. Estevez called pharmacy as well as resent Rx for lantus and to give us call is they have any further issues. States they're currently out of town but that he has lantus left to hold him until they get back on Sunday. Also advised of lab results, medications and recommendations, verbalized understanding. SHAKA

## 2018-05-04 NOTE — TELEPHONE ENCOUNTER
----- Message from Salena Rodriguez sent at 5/4/2018  8:47 AM CDT -----  Contact: [phone]  Returning call, please call back at [phone]. Thanks/ar

## 2018-05-06 DIAGNOSIS — H40.1122 PRIMARY OPEN ANGLE GLAUCOMA OF LEFT EYE, MODERATE STAGE: ICD-10-CM

## 2018-05-07 RX ORDER — LATANOPROST 50 UG/ML
1 SOLUTION/ DROPS OPHTHALMIC NIGHTLY
Qty: 2.5 ML | Refills: 4 | Status: SHIPPED | OUTPATIENT
Start: 2018-05-07 | End: 2018-05-18

## 2018-05-08 ENCOUNTER — TELEPHONE (OUTPATIENT)
Dept: INTERNAL MEDICINE | Facility: CLINIC | Age: 63
End: 2018-05-08

## 2018-05-08 NOTE — TELEPHONE ENCOUNTER
----- Message from Arin Noel sent at 5/8/2018  3:07 PM CDT -----  Contact: Elsie Choudhury pt  wife  Pt wife stated that he was told three days ago that his insulin would by ready today and has been sitting at the pharmacy to pick it up and it's still not ready very upset they are still waiting  for the doctor's approval  [phone] Thanks       [facility]/pharmacy #[id number] - [place] - [address] AT [facility]  [address] [place] [address]  Phone: [phone] Fax: [phone]

## 2018-05-18 ENCOUNTER — LAB VISIT (OUTPATIENT)
Dept: LAB | Facility: HOSPITAL | Age: 63
End: 2018-05-18
Attending: INTERNAL MEDICINE
Payer: MEDICARE

## 2018-05-18 ENCOUNTER — OFFICE VISIT (OUTPATIENT)
Dept: OPHTHALMOLOGY | Facility: CLINIC | Age: 63
End: 2018-05-18
Payer: MEDICARE

## 2018-05-18 ENCOUNTER — TELEPHONE (OUTPATIENT)
Dept: OPHTHALMOLOGY | Facility: CLINIC | Age: 63
End: 2018-05-18

## 2018-05-18 DIAGNOSIS — H40.1122 PRIMARY OPEN ANGLE GLAUCOMA (POAG) OF LEFT EYE, MODERATE STAGE: Primary | ICD-10-CM

## 2018-05-18 DIAGNOSIS — H02.401 PTOSIS OF RIGHT EYELID: ICD-10-CM

## 2018-05-18 DIAGNOSIS — Z97.0 PROSTHETIC EYE GLOBE: ICD-10-CM

## 2018-05-18 DIAGNOSIS — E11.29 PROTEINURIA DUE TO TYPE 2 DIABETES MELLITUS: ICD-10-CM

## 2018-05-18 DIAGNOSIS — R80.9 PROTEINURIA DUE TO TYPE 2 DIABETES MELLITUS: ICD-10-CM

## 2018-05-18 LAB
CREAT UR-MCNC: 127 MG/DL
PROT UR-MCNC: 20 MG/DL
PROT/CREAT RATIO, UR: 0.16

## 2018-05-18 PROCEDURE — 99212 OFFICE O/P EST SF 10 MIN: CPT | Mod: PBBFAC,PO | Performed by: OPHTHALMOLOGY

## 2018-05-18 PROCEDURE — 92012 INTRM OPH EXAM EST PATIENT: CPT | Mod: S$PBB,,, | Performed by: OPHTHALMOLOGY

## 2018-05-18 PROCEDURE — 99999 PR PBB SHADOW E&M-EST. PATIENT-LVL II: CPT | Mod: PBBFAC,,, | Performed by: OPHTHALMOLOGY

## 2018-05-18 PROCEDURE — 82570 ASSAY OF URINE CREATININE: CPT

## 2018-05-18 RX ORDER — BIMATOPROST 0.3 MG/ML
1 SOLUTION/ DROPS OPHTHALMIC NIGHTLY
Qty: 2.5 ML | Refills: 11 | Status: SHIPPED | OUTPATIENT
Start: 2018-05-18 | End: 2019-02-11 | Stop reason: SDUPTHER

## 2018-05-18 NOTE — PROGRESS NOTES
"    ===============================  05/18/2018   Yan Choudhury,   63 y.o. male   Last visit [facility]: :1/16/2018   Last visit eye dept. 1/16/2018  [facility]:  Corrected distance visual acuity was NLP in the right eye and 20/40 in the left eye.  Tonometry     Tonometry       Right Left    Pressure  19              Wearing Rx     Wearing Rx       Sphere Cylinder Axis Add    Right Balance       Left -2.50 +1.00 075 +2.50    Type:  Bifocal               Not recorded        Chief Complaint   Patient presents with    Glaucoma     here for iop ck on gtts, pt states he is very compliant with gtts     Ophthalmic Medications     Ophthalmic-Intraocular Pressure Reducing Agents, Prostaglandin Analogs Start End    bimatoprost (LUMIGAN) 0.03 % ophthalmic drops 5/18/2018 5/18/2019    Sig: Place 1 drop into the left eye every evening.    Route: Left Eye    latanoprost (XALATAN) 0.005 % ophthalmic solution (Discontinued) 4/16/2018 5/18/2018    Sig: Place 1 drop into the left eye once daily.    Route: Left Eye    Reason for Discontinue: Alternate therapy    latanoprost (XALATAN) 0.005 % ophthalmic solution (Discontinued) 4/25/2018 5/18/2018    Sig: Place 1 drop into the left eye once daily.    Route: Left Eye    latanoprost 0.005 % ophthalmic solution (Discontinued) 5/7/2018 5/18/2018    Sig: PLACE 1 DROP INTO THE LEFT EYE EVERY EVENING.    Route: Left Eye         HPI     Glaucoma    Additional comments: here for iop ck on gtts, pt states he is very   compliant with gtts           Comments   --DM since 2000  --H/O BDR and DME OS  H/o Av and Shiva 2014  --Focal OS June 2011  --VRTX/VMA OS "would not expect improvement with treatment secondary to   VRTX"  --Prosthesis OD secondary to trauma  --COAG   Resumed xalatan ou qhs 12/11/17  Tmax 25  (-1)  Gonio Open  Low RNFL  Last VF Bjerrum OS 12/11/17       Last edited by STEPH Rubi on 5/18/2018  1:11 PM. (History)          ________________  5/18/2018  Problem List Items Addressed " This Visit        Eye/Vision problems    Primary open-angle glaucoma, moderate stage - Primary  --IOP not within acceptable range relative to target IOP with risk of irreversible visual loss. Additional treatment required.Discussed options, risks, and benefits of additional medication, SLT laser, or incisional glaucoma surgery.     Patient chooses at this time to try Bimatoprost 0.03%    Reviewed importance of continued compliance with treatment and follow up.   RTC in 8 weeks          Relevant Medications    bimatoprost (LUMIGAN) 0.03 % ophthalmic drops       Other    Prosthetic eye globe    Relevant Orders    Ambulatory Referral to Ophthalmology      Other Visit Diagnoses     Ptosis of right eyelid      Rt. Lid completely closed over prosthesis.  I recommend he see Dr. Ya for possible surgical intervention.     Relevant Orders    Ambulatory Referral to Ophthalmology          .       ===========================

## 2018-05-18 NOTE — TELEPHONE ENCOUNTER
----- Message from LOKI Araiza MD sent at 5/18/2018  1:31 PM CDT -----  Please contact patient for consult.

## 2018-05-25 ENCOUNTER — OFFICE VISIT (OUTPATIENT)
Dept: NEPHROLOGY | Facility: CLINIC | Age: 63
End: 2018-05-25
Payer: MEDICARE

## 2018-05-25 VITALS
WEIGHT: 216.69 LBS | DIASTOLIC BLOOD PRESSURE: 68 MMHG | HEART RATE: 66 BPM | SYSTOLIC BLOOD PRESSURE: 94 MMHG | HEIGHT: 68 IN | BODY MASS INDEX: 32.84 KG/M2

## 2018-05-25 DIAGNOSIS — N17.9 AKI (ACUTE KIDNEY INJURY): ICD-10-CM

## 2018-05-25 DIAGNOSIS — N18.4 CKD (CHRONIC KIDNEY DISEASE) STAGE 4, GFR 15-29 ML/MIN: Primary | ICD-10-CM

## 2018-05-25 DIAGNOSIS — N25.81 HYPERPARATHYROIDISM, SECONDARY RENAL: ICD-10-CM

## 2018-05-25 PROCEDURE — 99999 PR PBB SHADOW E&M-EST. PATIENT-LVL III: CPT | Mod: PBBFAC,,, | Performed by: INTERNAL MEDICINE

## 2018-05-25 PROCEDURE — 99213 OFFICE O/P EST LOW 20 MIN: CPT | Mod: PBBFAC,PO | Performed by: INTERNAL MEDICINE

## 2018-05-25 PROCEDURE — 99214 OFFICE O/P EST MOD 30 MIN: CPT | Mod: S$PBB,,, | Performed by: INTERNAL MEDICINE

## 2018-05-25 NOTE — PROGRESS NOTES
PROGRESS NOTE FOR ESTABLISHED PATIENT    PHYSICIAN REQUESTING THE CONSULT: Dr. Sandra Estevez    REASON FOR VISIT: Renal insufficiency    63 y.o. male with history of CKD 3, HTN, CHF, anemia, DM2, CAD, pulmonary hypertension presents to the renal clinic for evaluation of renal insufficiency.       Patient is feeling fine today. He denies any SOB or LE edema.         Past Medical History:   Diagnosis Date    Arthritis     CAD (coronary artery disease)     Cataract     CHF (congestive heart failure)     Dr Calvillo    Chronic combined systolic and diastolic congestive heart failure 3/24/2015    CKD (chronic kidney disease), stage III     Diabetes mellitus type II      AM    Diabetic retinopathy     anti Veg F injections for macular edema    Diverticulitis of colon     ED (erectile dysfunction)     Glaucoma     HTN (hypertension)     Hyperlipidemia     Ischemic cardiomyopathy     Obesity     Pulmonary HTN        Past Surgical History:   Procedure Laterality Date    CARDIAC CATHETERIZATION  2009    CHOLECYSTECTOMY      COLONOSCOPY      EYE SURGERY      KNEE SURGERY         Review of patient's allergies indicates:  No Known Allergies    Current Outpatient Prescriptions   Medication Sig Dispense Refill    allopurinol (ZYLOPRIM) 100 MG tablet TAKE 1 TABLET (100 MG TOTAL) BY MOUTH ONCE DAILY. 90 tablet 0    aspirin (ECOTRIN) 81 MG EC tablet Take 81 mg by mouth once daily.      BD INSULIN SYRINGE ULTRA-FINE 0.5 mL 31 gauge x 5/16 Syrg USE AS DIRECTED TO INJECT INSULIN TWO TIMES DAILY 60 each 11    bimatoprost (LUMIGAN) 0.03 % ophthalmic drops Place 1 drop into the left eye every evening. 2.5 mL 11    blood sugar diagnostic Strp 1 strip by Misc.(Non-Drug; Combo Route) route 4 (four) times daily. Telcare 120 strip 11    bumetanide (BUMEX) 2 MG tablet Take 1 tablet (2 mg total) by mouth 2 (two) times daily. (Patient taking differently: Take 2 mg by mouth once daily. ) 75 tablet 11    carvedilol  "(COREG) 25 MG tablet Take 0.5 tablets (12.5 mg total) by mouth 2 (two) times daily with meals. Takes one-half tablet by mouth twice a day 30 tablet 11    insulin glargine (LANTUS) 100 unit/mL injection 50 units bid prn when BS< 150 30 mL 11    ipratropium-albuterol (COMBIVENT)  mcg/actuation inhaler Inhale 1 puff into the lungs every 6 (six) hours as needed for Wheezing or Shortness of Breath. Rescue 1 Package 0    IRON/VITAMIN B COMPLEX (GERITOL ORAL) Take by mouth.      lancets [facility] [place] [facility] 120 each 11    lisinopril (PRINIVIL,ZESTRIL) 5 MG tablet TAKE 1 TABLET (5 MG TOTAL) BY MOUTH ONCE DAILY. 90 tablet 3    metOLazone (ZAROXOLYN) 2.5 MG tablet TAKE 1 TABLET (2.5 MG TOTAL) BY MOUTH ONCE DAILY. TAKE 30 MINUTES BEFORE AM DOSE OF BUMEX  12    pantoprazole (PROTONIX) 40 MG tablet TAKE 1 TABLET BY MOUTH EVERY DAY FOR ACID REFLUX 90 tablet 1    pen needle, diabetic (BD ULTRA-FINE JOSLYN PEN NEEDLE) 32 gauge x 5/32" Ndle 1 each by Misc.(Non-Drug; Combo Route) route 4 (four) times daily. 100 each 11    potassium chloride SA (K-DUR,KLOR-CON) 10 MEQ tablet TAKE 1 TABLET BY MOUTH EVERY DAY 90 tablet 2    simvastatin (ZOCOR) 10 MG tablet Take 1 tablet (10 mg total) by mouth every evening. 30 tablet 11     No current facility-administered medications for this visit.        Family History   Problem Relation Age of Onset    Cancer Mother     Heart disease Father     Stroke Father     No Known Problems Sister     No Known Problems Brother     No Known Problems Maternal Aunt     No Known Problems Maternal Uncle     No Known Problems Paternal Aunt     No Known Problems Paternal Uncle     No Known Problems Maternal Grandmother     No Known Problems Maternal Grandfather     No Known Problems Paternal Grandmother     No Known Problems Paternal Grandfather     Amblyopia Neg Hx     Blindness Neg Hx     Cataracts Neg Hx     Diabetes Neg Hx     Glaucoma Neg Hx     Hypertension Neg Hx     Macular " "degeneration Neg Hx     Retinal detachment Neg Hx     Strabismus Neg Hx     Thyroid disease Neg Hx        Social History     Social History    Marital status:      Spouse name: N/A    Number of children: 2    Years of education: N/A     Occupational History    [employer]     Social History Main Topics    Smoking status: Never Smoker    Smokeless tobacco: Never Used    Alcohol use 0.0 oz/week      Comment: " once in awhile...special occassions"    Drug use: No    Sexual activity: Not Currently     Other Topics Concern    Not on file     Social History Narrative    . Lives with spouse. Has 2 children. 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       Review of Systems:  1. GENERAL: patient denies any fever, weight changes, generalized weakness, dizziness.  2. HEENT: patient denies headaches, visual disturbances, swallowing problems, sinus pain, nasal congestion.  3. CARDIOVASCULAR: patient denies chest pain, palpitations.  4. PULMONARY: patient denies SOB, no coughing, hemoptysis, wheezing.  5. GASTROINTESTINAL: patient denies abdominal pain, nausea, vomiting, diarrhea.  6. GENITOURINARY: patient denies dysuria, hematuria, hesitancy, frequency.  7. EXTREMITIES: patient denies LE edema, no LE cramping.  8. DERMATOLOGY: patient denies rashes, ulcers.  9. NEURO: patient denies tremors, extremity weakness, extremity numbness/tingling.  10. MUSCULOSKELETAL: patient denies joint pain, joint swelling.  11. HEMATOLOGY: patient denies rectal or gum bleeding.  12: PSYCH: patient denies anxiety, depression.      PHYSICAL EXAM:  BP 94/68   Pulse 66   Ht 5' 8" (1.727 m)   Wt 98.3 kg (216 lb 11.4 oz)   BMI 32.95 kg/m²     GENERAL: Pleasant gentleman presents to clinic with non-labored breathing.  HEENT: PER, no nasal discharge, no icterus, no oral exudates, moist mucosal membranes.  NECK: no thyroid mass, no lymphadenopathy.  HEART: RRR S1/S2, no " rubs, good peripheral pulses.  LUNGS: CTA bilaterally, no wheezing, breathing is nonlabored.  ABDOMEN: soft, nontender, not distended, bowel sounds are present, no abdominal hernia, colostomy in place  EXTREM: No LE edema.  SKIN: no rashes, skin is warm and dry.  NEURO: A & O x 3, no obvious focal signs.    LABORATORY RESULTS:    Lab Results   Component Value Date    CREATININE 3.2 (H) 05/18/2018    BUN 47 (H) 05/18/2018     05/18/2018    K 4.4 05/18/2018     05/18/2018    CO2 26 05/18/2018      Lab Results   Component Value Date    .0 (H) 01/26/2018    CALCIUM 9.4 05/18/2018    PHOS 3.8 05/18/2018     Lab Results   Component Value Date    ALBUMIN 3.1 (L) 05/18/2018     Lab Results   Component Value Date    WBC 6.43 12/21/2017    HGB 14.1 12/21/2017    HCT 43.2 12/21/2017    MCV 89 12/21/2017     (L) 12/21/2017     Urinalysis: + 2 protein, 6 RBC (10/25/17)      ASSESSMENT AND PLAN:  63 y.o. male with history of CKD 3, HTN, CHF, anemia, DM2, CAD, pulmonary hypertension presents to the renal clinic for evaluation of renal insufficiency.     1. Renal insufficiency: Patient presents with renal insufficiency, consistent with CKD stage 4. Patient likely suffers from cardiorenal syndrome (EF 20-25%) and creatinine has been fluctuating (in addition to diabetic nephropathy). His creatinine has increased from 2.3 to 3.2 since last visit. Given his stable volume status, no medication changes will be done today.  Patient's renal function will be monitored closely and he will return to the clinic in 3 months for follow up. Patient is on fluid restrictions (about 40-50 ounces per day).     2. Electrolytes: Within normal limits.    3. Acid base status: No acute issues.    4. Volume: Mild LE edema. Continue Bumex.     5. Hypertension: BP borderline low (consequences of poor cardiac function). He denies any hypotension-related symptoms.      6. Medications: Reviewed. Agree with current medical regimen.  Continue Lisinopril.    7. Mild anemia: monitor.    8. DM2: well controlled with last HgA1c at 6.5 (5/2/18).     9. CAD/CHF: As per Cardiology (Dr. Calvillo).

## 2018-05-28 RX ORDER — PEN NEEDLE, DIABETIC 29 G X1/2"
NEEDLE, DISPOSABLE MISCELLANEOUS
Qty: 60 EACH | Refills: 11 | Status: SHIPPED | OUTPATIENT
Start: 2018-05-28

## 2018-06-04 ENCOUNTER — CLINICAL SUPPORT (OUTPATIENT)
Dept: CARDIOLOGY | Facility: CLINIC | Age: 63
End: 2018-06-04
Attending: INTERNAL MEDICINE
Payer: MEDICARE

## 2018-06-04 DIAGNOSIS — I25.5 ISCHEMIC CARDIOMYOPATHY: ICD-10-CM

## 2018-06-04 DIAGNOSIS — Z95.810 ICD (IMPLANTABLE CARDIOVERTER-DEFIBRILLATOR) IN PLACE: ICD-10-CM

## 2018-06-04 DIAGNOSIS — I50.42 CHRONIC COMBINED SYSTOLIC AND DIASTOLIC HEART FAILURE: ICD-10-CM

## 2018-06-04 PROCEDURE — 93296 REM INTERROG EVL PM/IDS: CPT | Mod: PBBFAC,PO | Performed by: INTERNAL MEDICINE

## 2018-06-04 PROCEDURE — 93297 REM INTERROG DEV EVAL ICPMS: CPT | Mod: ,,, | Performed by: INTERNAL MEDICINE

## 2018-06-04 PROCEDURE — 93295 DEV INTERROG REMOTE 1/2/MLT: CPT | Mod: ,,, | Performed by: INTERNAL MEDICINE

## 2018-06-19 ENCOUNTER — OFFICE VISIT (OUTPATIENT)
Dept: CARDIOLOGY | Facility: CLINIC | Age: 63
End: 2018-06-19
Payer: MEDICARE

## 2018-06-19 VITALS
HEIGHT: 68 IN | DIASTOLIC BLOOD PRESSURE: 70 MMHG | BODY MASS INDEX: 34.55 KG/M2 | HEART RATE: 84 BPM | WEIGHT: 227.94 LBS | SYSTOLIC BLOOD PRESSURE: 120 MMHG

## 2018-06-19 DIAGNOSIS — E66.9 OBESITY (BMI 30.0-34.9): ICD-10-CM

## 2018-06-19 DIAGNOSIS — I27.20 PULMONARY HTN: ICD-10-CM

## 2018-06-19 DIAGNOSIS — E78.5 HYPERLIPIDEMIA ASSOCIATED WITH TYPE 2 DIABETES MELLITUS: ICD-10-CM

## 2018-06-19 DIAGNOSIS — N18.30 STAGE 3 CHRONIC KIDNEY DISEASE: Primary | ICD-10-CM

## 2018-06-19 DIAGNOSIS — E11.59 HYPERTENSION ASSOCIATED WITH DIABETES: ICD-10-CM

## 2018-06-19 DIAGNOSIS — E11.22 TYPE 2 DIABETES MELLITUS WITH STAGE 3 CHRONIC KIDNEY DISEASE, WITH LONG-TERM CURRENT USE OF INSULIN: ICD-10-CM

## 2018-06-19 DIAGNOSIS — Z79.4 TYPE 2 DIABETES MELLITUS WITH STAGE 3 CHRONIC KIDNEY DISEASE, WITH LONG-TERM CURRENT USE OF INSULIN: ICD-10-CM

## 2018-06-19 DIAGNOSIS — I50.42 CHRONIC COMBINED SYSTOLIC AND DIASTOLIC CONGESTIVE HEART FAILURE: ICD-10-CM

## 2018-06-19 DIAGNOSIS — I15.2 HYPERTENSION ASSOCIATED WITH DIABETES: ICD-10-CM

## 2018-06-19 DIAGNOSIS — N18.30 TYPE 2 DIABETES MELLITUS WITH STAGE 3 CHRONIC KIDNEY DISEASE, WITH LONG-TERM CURRENT USE OF INSULIN: ICD-10-CM

## 2018-06-19 DIAGNOSIS — E11.69 HYPERLIPIDEMIA ASSOCIATED WITH TYPE 2 DIABETES MELLITUS: ICD-10-CM

## 2018-06-19 DIAGNOSIS — R94.39 ABNORMAL STRESS TEST: ICD-10-CM

## 2018-06-19 DIAGNOSIS — Z95.810 BIVENTRICULAR ICD (IMPLANTABLE CARDIOVERTER-DEFIBRILLATOR) IN PLACE: Chronic | ICD-10-CM

## 2018-06-19 DIAGNOSIS — I25.10 CAD, MULTIPLE VESSEL: Chronic | ICD-10-CM

## 2018-06-19 DIAGNOSIS — G47.33 OSA ON CPAP: Chronic | ICD-10-CM

## 2018-06-19 DIAGNOSIS — I25.5 ISCHEMIC CARDIOMYOPATHY: ICD-10-CM

## 2018-06-19 DIAGNOSIS — I44.7 LBBB (LEFT BUNDLE BRANCH BLOCK): ICD-10-CM

## 2018-06-19 PROCEDURE — 99214 OFFICE O/P EST MOD 30 MIN: CPT | Mod: S$PBB,,, | Performed by: INTERNAL MEDICINE

## 2018-06-19 PROCEDURE — 99213 OFFICE O/P EST LOW 20 MIN: CPT | Mod: PBBFAC,PO | Performed by: INTERNAL MEDICINE

## 2018-06-19 PROCEDURE — 99999 PR PBB SHADOW E&M-EST. PATIENT-LVL III: CPT | Mod: PBBFAC,,, | Performed by: INTERNAL MEDICINE

## 2018-06-19 RX ORDER — METOLAZONE 2.5 MG/1
2.5 TABLET ORAL
Qty: 12 TABLET | Refills: 11
Start: 2018-06-20 | End: 2018-11-27

## 2018-06-19 NOTE — PROGRESS NOTES
Subjective:    Patient ID:  Yan Choudhury is a 63 y.o. male who presents for evaluation of Coronary Artery Disease; Hypertension; Hyperlipidemia; Congestive Heart Failure; Shortness of Breath; and Risk Factor Management For Atherosclerosis      HPI Mr. Choudhury returns for f/u.  His current medical conditions include CAD/ICM, DCM, LBBB, HTN, CRI, DM, CRT-ICD,  PTHN, dyslipidemia, obesity. Nonsmoker.   Past hx pertinent for following:  LHC was done 2009 showed diffuse CAD, and severe distal CAD involving apical LAD, distal RCA/distal LCX. Echo 10/10 showed LVEF 25%.   PET stress 2016 showed inferior scar, no significant ischemia noted and LVEF < 35%.   s/p BIV ICD 3/16 for CHF/LBBB.  Echo 12/17 EF 20%, DD, mod PHTN.   Here for f/u.  CAD seems stable.  He is not having any active anginal sxs on current medical tx.  CHF overall is stable.  He has chronic LIM, unchanged.  No pnd/orthopnea.  No unusual leg edema.  Weight is up.    ICD interrogation last check, normal device function.  Has not fired.  CRI variable, latest creatinine 3.2  DM well controlled, HGAIC at goal.  HTN well controlled on current meds.  Lipids well controlled, on statin.  Compliant with low salt diet and fluid restriction.            Current Outpatient Prescriptions:     allopurinol (ZYLOPRIM) 100 MG tablet, TAKE 1 TABLET (100 MG TOTAL) BY MOUTH ONCE DAILY., Disp: 90 tablet, Rfl: 0    aspirin (ECOTRIN) 81 MG EC tablet, Take 81 mg by mouth once daily., Disp: , Rfl:     bimatoprost (LUMIGAN) 0.03 % ophthalmic drops, Place 1 drop into the left eye every evening., Disp: 2.5 mL, Rfl: 11    bumetanide (BUMEX) 2 MG tablet, Take 1 tablet (2 mg total) by mouth 2 (two) times daily. (Patient taking differently: Take 2 mg by mouth once daily. ), Disp: 75 tablet, Rfl: 11    carvedilol (COREG) 25 MG tablet, Take 0.5 tablets (12.5 mg total) by mouth 2 (two) times daily with meals. Takes one-half tablet by mouth twice a day, Disp: 30 tablet, Rfl: 11    insulin  "glargine (LANTUS) 100 unit/mL injection, 50 units bid prn when BS< 150, Disp: 30 mL, Rfl: 11    IRON/VITAMIN B COMPLEX (GERITOL ORAL), Take by mouth., Disp: , Rfl:     lisinopril (PRINIVIL,ZESTRIL) 5 MG tablet, TAKE 1 TABLET (5 MG TOTAL) BY MOUTH ONCE DAILY., Disp: 90 tablet, Rfl: 3    pantoprazole (PROTONIX) 40 MG tablet, TAKE 1 TABLET BY MOUTH EVERY DAY FOR ACID REFLUX, Disp: 90 tablet, Rfl: 1    potassium chloride SA (K-DUR,KLOR-CON) 10 MEQ tablet, TAKE 1 TABLET BY MOUTH EVERY DAY, Disp: 90 tablet, Rfl: 2    simvastatin (ZOCOR) 10 MG tablet, Take 1 tablet (10 mg total) by mouth every evening., Disp: 30 tablet, Rfl: 11    BD INSULIN SYRINGE ULTRA-FINE 0.5 mL 31 gauge x 5/16 Syrg, USE AS DIRECTED TO INJECT INSULIN TWO TIMES DAILY, Disp: 60 each, Rfl: 11    blood sugar diagnostic Strp, 1 strip by Misc.(Non-Drug; Combo Route) route 4 (four) times daily. Telcare, Disp: 120 strip, Rfl: 11    ipratropium-albuterol (COMBIVENT)  mcg/actuation inhaler, Inhale 1 puff into the lungs every 6 (six) hours as needed for Wheezing or Shortness of Breath. Rescue, Disp: 1 Package, Rfl: 0    lancets Misc, For tel care, Disp: 120 each, Rfl: 11    [START ON 6/20/2018] metOLazone (ZAROXOLYN) 2.5 MG tablet, Take 1 tablet (2.5 mg total) by mouth every Mon, Wed, Fri., Disp: 12 tablet, Rfl: 11    pen needle, diabetic (BD ULTRA-FINE JOSLYN PEN NEEDLE) 32 gauge x 5/32" Ndle, 1 each by Misc.(Non-Drug; Combo Route) route 4 (four) times daily., Disp: 100 each, Rfl: 11      Review of Systems   Constitution: Positive for weight gain.   HENT: Negative.    Eyes: Negative.    Cardiovascular: Positive for dyspnea on exertion.   Respiratory: Positive for shortness of breath.    Endocrine: Negative.    Hematologic/Lymphatic: Negative.    Skin: Negative.    Musculoskeletal: Negative.    Gastrointestinal: Negative.    Genitourinary: Negative.    Neurological: Negative.    Psychiatric/Behavioral: Negative.    Allergic/Immunologic: Negative.  " "      /70 (BP Method: Large (Manual))   Pulse 84   Ht 5' 8" (1.727 m)   Wt 103.4 kg (227 lb 15.3 oz)   BMI 34.66 kg/m²        Objective:    Physical Exam   Constitutional: He is oriented to person, place, and time. He appears well-developed and well-nourished.   HENT:   Head: Normocephalic.   Neck: Normal range of motion. Neck supple. Normal carotid pulses, no hepatojugular reflux and no JVD present. Carotid bruit is not present. No thyromegaly present.   Cardiovascular: Normal rate, regular rhythm, S1 normal and S2 normal.  PMI is not displaced.  Exam reveals no S3, no S4, no distant heart sounds, no friction rub, no midsystolic click and no opening snap.    No murmur heard.  Pulses:       Radial pulses are 2+ on the right side, and 2+ on the left side.   Pulmonary/Chest: Effort normal. He has no wheezes. He has rales in the right lower field and the left lower field.   Abdominal: Soft. Bowel sounds are normal. He exhibits no distension, no abdominal bruit, no ascites and no mass. There is no tenderness.   Musculoskeletal: He exhibits no edema.   Neurological: He is alert and oriented to person, place, and time.   Skin: Skin is warm.   Psychiatric: He has a normal mood and affect. His behavior is normal.   Nursing note and vitals reviewed.      I have reviewed all pertinent labs and cardiac studies.    Component      Latest Ref Rng & Units 5/2/2018             BNP      0 - 99 pg/mL 504 (H)         Chemistry        Component Value Date/Time     05/18/2018 1357    K 4.4 05/18/2018 1357     05/18/2018 1357    CO2 26 05/18/2018 1357    BUN 47 (H) 05/18/2018 1357    CREATININE 3.2 (H) 05/18/2018 1357     (H) 05/18/2018 1357        Component Value Date/Time    CALCIUM 9.4 05/18/2018 1357    ALKPHOS 77 05/02/2018 1020    AST 24 05/02/2018 1020    ALT 9 (L) 05/02/2018 1020    BILITOT 0.8 05/02/2018 1020    ESTGFRAFRICA 22.3 (A) 05/18/2018 1357    EGFRNONAA 19.3 (A) 05/18/2018 1357        Lab " Results   Component Value Date    WBC 6.43 12/21/2017    HGB 14.1 12/21/2017    HCT 43.2 12/21/2017    MCV 89 12/21/2017     (L) 12/21/2017     Lab Results   Component Value Date    HGBA1C 6.5 (H) 05/02/2018     Lab Results   Component Value Date    CHOL 105 (L) 05/02/2018    CHOL 105 (L) 05/02/2018    CHOL 112 (L) 04/20/2017     Lab Results   Component Value Date    HDL 28 (L) 05/02/2018    HDL 28 (L) 05/02/2018    HDL 39 (L) 04/20/2017     Lab Results   Component Value Date    LDLCALC 52.8 (L) 05/02/2018    LDLCALC 52.8 (L) 05/02/2018    LDLCALC 58.6 (L) 04/20/2017     Lab Results   Component Value Date    TRIG 121 05/02/2018    TRIG 121 05/02/2018    TRIG 72 04/20/2017     Lab Results   Component Value Date    CHOLHDL 26.7 05/02/2018    CHOLHDL 26.7 05/02/2018    CHOLHDL 34.8 04/20/2017           Assessment:       1. Stage 3 chronic kidney disease    2. Pulmonary HTN    3. JAHAIRA on CPAP    4. Obesity (BMI 30.0-34.9)    5. LBBB (left bundle branch block)    6. Abnormal stress test    7. Biventricular ICD (implantable cardioverter-defibrillator) in place    8. CAD, multiple vessel    9. Chronic combined systolic and diastolic congestive heart failure    10. Hyperlipidemia associated with type 2 diabetes mellitus    11. Hypertension associated with diabetes    12. Ischemic cardiomyopathy    13. Type 2 diabetes mellitus with stage 3 chronic kidney disease, with long-term current use of insulin         Plan:             Stable CAD/CHF conditions overall but his weight is up some and may be putting on fluid although last BNP was real low for pt.  Restart Metolazone 2.5 mg M, Wed, Friday am.   Continue other meds.  Cardiac low salt diet  Fluid restriction 50 ounces daily.  Keep DM well controlled.  Continue statin.  Continue ICD clinic f/u.  F/u with Nephrology at upcoming appt in August.    F/u 3 months with labs.

## 2018-06-27 ENCOUNTER — OFFICE VISIT (OUTPATIENT)
Dept: INTERNAL MEDICINE | Facility: CLINIC | Age: 63
End: 2018-06-27
Payer: MEDICARE

## 2018-06-27 VITALS
WEIGHT: 223.13 LBS | TEMPERATURE: 97 F | SYSTOLIC BLOOD PRESSURE: 126 MMHG | HEIGHT: 68 IN | BODY MASS INDEX: 33.82 KG/M2 | DIASTOLIC BLOOD PRESSURE: 82 MMHG | HEART RATE: 75 BPM | OXYGEN SATURATION: 98 %

## 2018-06-27 DIAGNOSIS — Z23 NEED FOR PROPHYLACTIC VACCINATION WITH STREPTOCOCCUS PNEUMONIAE (PNEUMOCOCCUS) AND INFLUENZA VACCINES: ICD-10-CM

## 2018-06-27 DIAGNOSIS — N18.4 TYPE 2 DIABETES MELLITUS WITH STAGE 4 CHRONIC KIDNEY DISEASE, WITH LONG-TERM CURRENT USE OF INSULIN: ICD-10-CM

## 2018-06-27 DIAGNOSIS — E66.9 OBESITY (BMI 30.0-34.9): ICD-10-CM

## 2018-06-27 DIAGNOSIS — I50.42 CHRONIC COMBINED SYSTOLIC AND DIASTOLIC CONGESTIVE HEART FAILURE: ICD-10-CM

## 2018-06-27 DIAGNOSIS — N18.4 CKD (CHRONIC KIDNEY DISEASE) STAGE 4, GFR 15-29 ML/MIN: ICD-10-CM

## 2018-06-27 DIAGNOSIS — E11.22 TYPE 2 DIABETES MELLITUS WITH STAGE 4 CHRONIC KIDNEY DISEASE, WITH LONG-TERM CURRENT USE OF INSULIN: ICD-10-CM

## 2018-06-27 DIAGNOSIS — Z79.4 TYPE 2 DIABETES MELLITUS WITH STAGE 4 CHRONIC KIDNEY DISEASE, WITH LONG-TERM CURRENT USE OF INSULIN: ICD-10-CM

## 2018-06-27 DIAGNOSIS — N52.1 ERECTILE DYSFUNCTION ASSOCIATED WITH TYPE 2 DIABETES MELLITUS: Primary | ICD-10-CM

## 2018-06-27 DIAGNOSIS — E11.69 ERECTILE DYSFUNCTION ASSOCIATED WITH TYPE 2 DIABETES MELLITUS: Primary | ICD-10-CM

## 2018-06-27 PROCEDURE — 99999 PR PBB SHADOW E&M-EST. PATIENT-LVL III: CPT | Mod: PBBFAC,,, | Performed by: FAMILY MEDICINE

## 2018-06-27 PROCEDURE — 99213 OFFICE O/P EST LOW 20 MIN: CPT | Mod: 25,S$PBB,, | Performed by: FAMILY MEDICINE

## 2018-06-27 PROCEDURE — G0009 ADMIN PNEUMOCOCCAL VACCINE: HCPCS | Mod: PBBFAC,PO

## 2018-06-27 PROCEDURE — 99213 OFFICE O/P EST LOW 20 MIN: CPT | Mod: PBBFAC,PO | Performed by: FAMILY MEDICINE

## 2018-06-27 NOTE — PROGRESS NOTES
Subjective:       Patient ID: Yan Choudhury is a 63 y.o. male.    Chief Complaint: Follow-up    63-year-old  male patient with Patient Active Problem List:     Hypertension associated with diabetes     ED (erectile dysfunction)     Ischemic cardiomyopathy     Pulmonary HTN     Hyperlipidemia associated with type 2 diabetes mellitus     Loss of eye - Right Eye     LBBB (left bundle branch block)     Abnormal stress test     Cysts of eyelids     CKD (chronic kidney disease) stage 4, GFR 15-29 ml/min     CAD, multiple vessel     Primary open-angle glaucoma, moderate stage     Biventricular ICD (implantable cardioverter-defibrillator) in place     Anemia     Colostomy in place     Type 2 diabetes mellitus with stage 4 chronic kidney disease, with long-term current use of insulin     JAHAIRA on CPAP     Gastroesophageal reflux disease     Chronic gout without tophus     Chronic combined systolic and diastolic congestive heart failure     Obesity (BMI 30.0-34.9)     Vitamin D deficiency     Onychomycosis of multiple toenails with type 2 diabetes mellitus     Prosthetic eye globe  Concerned about having difficulty with erection, and delay with  erection off and on for the past several months.  Denies any decreased sexual desire.   Sugars have been stable  Patient has seen Cardiology recently and was advised to discuss further with PCP  Has been drinking adequate fluids but noted minimal worsening kidney functions recently  Denies any abdominal discomfort fatigue  Patient continues to have minimal shortness of breath for which he had seen Cardiology      Review of Systems   Constitutional: Negative for appetite change and fatigue.   Eyes: Negative for visual disturbance.   Respiratory: Positive for shortness of breath. Negative for wheezing.    Cardiovascular: Negative for chest pain, palpitations and leg swelling.   Gastrointestinal: Negative for abdominal pain, nausea and vomiting.   Endocrine: Negative for  "polydipsia, polyphagia and polyuria.   Genitourinary: Negative for difficulty urinating, dysuria and testicular pain.   Musculoskeletal: Negative for myalgias.   Skin: Negative for rash.   Neurological: Negative for weakness, numbness and headaches.   Psychiatric/Behavioral: Negative for sleep disturbance.         /82 (BP Location: Right arm, Patient Position: Sitting)   Pulse 75   Temp 96.5 °F (35.8 °C) (Tympanic)   Ht 5' 8" (1.727 m)   Wt 101.2 kg (223 lb 1.7 oz)   SpO2 98%   BMI 33.92 kg/m²   Objective:      Physical Exam   Constitutional: He is oriented to person, place, and time. He appears well-developed and well-nourished.   HENT:   Head: Normocephalic and atraumatic.   Mouth/Throat: Oropharynx is clear and moist.   Cardiovascular: Normal rate, regular rhythm and normal heart sounds.    No murmur heard.  Pulmonary/Chest: Effort normal and breath sounds normal. No respiratory distress. He has no wheezes.   Abdominal: Soft. Bowel sounds are normal. There is no tenderness.   Musculoskeletal: He exhibits no edema.   Neurological: He is alert and oriented to person, place, and time.   Skin: Skin is warm and dry. No rash noted.   Psychiatric: He has a normal mood and affect.         Assessment:       1. Erectile dysfunction associated with type 2 diabetes mellitus    2. Type 2 diabetes mellitus with stage 4 chronic kidney disease, with long-term current use of insulin    3. Obesity (BMI 30.0-34.9)    4. CKD (chronic kidney disease) stage 4, GFR 15-29 ml/min    5. Need for prophylactic vaccination with Streptococcus pneumoniae (Pneumococcus) and Influenza vaccines    6. Chronic combined systolic and diastolic congestive heart failure        Plan:   Erectile dysfunction associated with type 2 diabetes mellitus  -     Testosterone; Future; Expected date: 06/27/2018  -     Testosterone, free; Future; Expected date: 06/27/2018  -     Ambulatory referral to Urology  Will check testosterone levels " tomorrow  Will refer to Urology for further evaluation  Reviewed recent A1c which looks stable    Type 2 diabetes mellitus with stage 4 chronic kidney disease, with long-term current use of insulin  -     Basic metabolic panel; Future; Expected date: 06/27/2018  Continue current regimen and noted worsening kidney functions  Follow-up with Nephrology    Obesity (BMI 30.0-34.9)-Lifestyle modifications recommended to lose weight with BMI 33    CKD (chronic kidney disease) stage 4, GFR 15-29 ml/min  -     Basic metabolic panel; Future; Expected date: 06/27/2018  Avoid over-the-counter NSAIDs and follow up with Nephrology    Need for prophylactic vaccination with Streptococcus pneumoniae (Pneumococcus) and Influenza vaccines  -     (In Office Administered) Pneumococcal Conjugate Vaccine (13 Valent) (IM)  Prevnar given today to finish pneumococcal series    Chronic combined systolic and diastolic congestive heart failure- stable, having minimal dyspnea on exertion  Followed by Cardiology

## 2018-07-09 ENCOUNTER — LAB VISIT (OUTPATIENT)
Dept: LAB | Facility: HOSPITAL | Age: 63
End: 2018-07-09
Attending: FAMILY MEDICINE
Payer: MEDICARE

## 2018-07-09 DIAGNOSIS — N18.4 CKD (CHRONIC KIDNEY DISEASE) STAGE 4, GFR 15-29 ML/MIN: ICD-10-CM

## 2018-07-09 DIAGNOSIS — E11.69 ERECTILE DYSFUNCTION ASSOCIATED WITH TYPE 2 DIABETES MELLITUS: ICD-10-CM

## 2018-07-09 DIAGNOSIS — E11.22 TYPE 2 DIABETES MELLITUS WITH STAGE 4 CHRONIC KIDNEY DISEASE, WITH LONG-TERM CURRENT USE OF INSULIN: ICD-10-CM

## 2018-07-09 DIAGNOSIS — N52.1 ERECTILE DYSFUNCTION ASSOCIATED WITH TYPE 2 DIABETES MELLITUS: ICD-10-CM

## 2018-07-09 DIAGNOSIS — N18.4 TYPE 2 DIABETES MELLITUS WITH STAGE 4 CHRONIC KIDNEY DISEASE, WITH LONG-TERM CURRENT USE OF INSULIN: ICD-10-CM

## 2018-07-09 DIAGNOSIS — Z79.4 TYPE 2 DIABETES MELLITUS WITH STAGE 4 CHRONIC KIDNEY DISEASE, WITH LONG-TERM CURRENT USE OF INSULIN: ICD-10-CM

## 2018-07-09 LAB
ANION GAP SERPL CALC-SCNC: 10 MMOL/L
BUN SERPL-MCNC: 36 MG/DL
CALCIUM SERPL-MCNC: 9.2 MG/DL
CHLORIDE SERPL-SCNC: 103 MMOL/L
CO2 SERPL-SCNC: 28 MMOL/L
CREAT SERPL-MCNC: 2.1 MG/DL
EST. GFR  (AFRICAN AMERICAN): 37.1 ML/MIN/1.73 M^2
EST. GFR  (NON AFRICAN AMERICAN): 32.1 ML/MIN/1.73 M^2
GLUCOSE SERPL-MCNC: 185 MG/DL
POTASSIUM SERPL-SCNC: 3.9 MMOL/L
SODIUM SERPL-SCNC: 141 MMOL/L
TESTOST SERPL-MCNC: 522 NG/DL

## 2018-07-09 PROCEDURE — 84402 ASSAY OF FREE TESTOSTERONE: CPT

## 2018-07-09 PROCEDURE — 80048 BASIC METABOLIC PNL TOTAL CA: CPT

## 2018-07-09 PROCEDURE — 36415 COLL VENOUS BLD VENIPUNCTURE: CPT | Mod: PO

## 2018-07-09 PROCEDURE — 84403 ASSAY OF TOTAL TESTOSTERONE: CPT

## 2018-07-12 LAB — TESTOST FREE SERPL-MCNC: 8 PG/ML

## 2018-07-13 ENCOUNTER — OFFICE VISIT (OUTPATIENT)
Dept: OPHTHALMOLOGY | Facility: CLINIC | Age: 63
End: 2018-07-13
Payer: MEDICARE

## 2018-07-13 DIAGNOSIS — H40.1132 PRIMARY OPEN ANGLE GLAUCOMA (POAG) OF BOTH EYES, MODERATE STAGE: Primary | ICD-10-CM

## 2018-07-13 PROCEDURE — 99999 PR PBB SHADOW E&M-EST. PATIENT-LVL II: CPT | Mod: PBBFAC,,, | Performed by: OPHTHALMOLOGY

## 2018-07-13 PROCEDURE — 99212 OFFICE O/P EST SF 10 MIN: CPT | Mod: PBBFAC,PO | Performed by: OPHTHALMOLOGY

## 2018-07-13 PROCEDURE — 99213 OFFICE O/P EST LOW 20 MIN: CPT | Mod: S$PBB,,, | Performed by: OPHTHALMOLOGY

## 2018-07-13 NOTE — PROGRESS NOTES
"    ===============================  07/13/2018   Yan Choudhury,   63 y.o. male   Last visit [facility]: :5/18/2018   Last visit eye dept. 5/18/2018  [facility]:  Corrected distance visual acuity was NLP in the right eye and 20/40 in the left eye.   Not recorded         Not recorded         Not recorded        Chief Complaint   Patient presents with    Glaucoma     8 week iop check     Ophthalmic Medications     Ophthalmic-Intraocular Pressure Reducing Agents, Prostaglandin Analogs Start End    bimatoprost (LUMIGAN) 0.03 % ophthalmic drops 5/18/2018 5/18/2019    Sig: Place 1 drop into the left eye every evening.    Route: Left Eye         HPI     Glaucoma    Additional comments: 8 week iop check           Comments   --DM since 2000  --H/O BDR and DME OS  H/o [facility] 2014  --Focal OS June 2011  --VRTX/VMA OS "would not expect improvement with treatment secondary to   VRTX"  --Prosthesis OD secondary to trauma  --COAG   Resumed xalatan ou qhs 12/11/17  Tmax 25  (-1)  Gonio Open  Low RNFL  Last VF Bjerrum OS 12/11/17       Last edited by Estella Forbes on 7/13/2018 11:27 AM. (History)          ________________  7/13/2018  Problem List Items Addressed This Visit     None          .T 17  Cd0.7  mnin ns    Post sge Ok  rtc 4 mon ths         ===========================    "

## 2018-07-19 DIAGNOSIS — I50.9 ACUTE ON CHRONIC CONGESTIVE HEART FAILURE: ICD-10-CM

## 2018-07-19 RX ORDER — BUMETANIDE 2 MG/1
2 TABLET ORAL DAILY
Qty: 90 TABLET | Refills: 3 | Status: SHIPPED | OUTPATIENT
Start: 2018-07-19 | End: 2018-08-17 | Stop reason: SDUPTHER

## 2018-07-20 RX ORDER — ALLOPURINOL 100 MG/1
TABLET ORAL
Qty: 90 TABLET | Refills: 0 | Status: SHIPPED | OUTPATIENT
Start: 2018-07-20 | End: 2018-10-20 | Stop reason: SDUPTHER

## 2018-08-08 RX ORDER — SIMVASTATIN 10 MG/1
10 TABLET, FILM COATED ORAL NIGHTLY
Qty: 30 TABLET | Refills: 7 | Status: SHIPPED | OUTPATIENT
Start: 2018-08-08 | End: 2019-09-17 | Stop reason: SDUPTHER

## 2018-08-08 RX ORDER — CARVEDILOL 25 MG/1
TABLET ORAL
Qty: 30 TABLET | Refills: 9 | Status: SHIPPED | OUTPATIENT
Start: 2018-08-08 | End: 2019-01-31

## 2018-08-10 ENCOUNTER — LAB VISIT (OUTPATIENT)
Dept: LAB | Facility: HOSPITAL | Age: 63
End: 2018-08-10
Attending: INTERNAL MEDICINE
Payer: MEDICARE

## 2018-08-10 DIAGNOSIS — N18.4 CKD (CHRONIC KIDNEY DISEASE) STAGE 4, GFR 15-29 ML/MIN: ICD-10-CM

## 2018-08-10 DIAGNOSIS — N18.4 CKD (CHRONIC KIDNEY DISEASE) STAGE 4, GFR 15-29 ML/MIN: Primary | ICD-10-CM

## 2018-08-10 DIAGNOSIS — N25.81 HYPERPARATHYROIDISM, SECONDARY RENAL: ICD-10-CM

## 2018-08-10 DIAGNOSIS — N17.9 AKI (ACUTE KIDNEY INJURY): ICD-10-CM

## 2018-08-10 DIAGNOSIS — I50.42 CHRONIC COMBINED SYSTOLIC AND DIASTOLIC CONGESTIVE HEART FAILURE: ICD-10-CM

## 2018-08-10 LAB
ALBUMIN SERPL BCP-MCNC: 3.1 G/DL
ALBUMIN SERPL BCP-MCNC: 3.1 G/DL
ALP SERPL-CCNC: 76 U/L
ALT SERPL W/O P-5'-P-CCNC: 18 U/L
ANION GAP SERPL CALC-SCNC: 13 MMOL/L
ANION GAP SERPL CALC-SCNC: 13 MMOL/L
AST SERPL-CCNC: 32 U/L
BILIRUB SERPL-MCNC: 0.5 MG/DL
BNP SERPL-MCNC: 334 PG/ML
BUN SERPL-MCNC: 37 MG/DL
BUN SERPL-MCNC: 37 MG/DL
CALCIUM SERPL-MCNC: 9.2 MG/DL
CALCIUM SERPL-MCNC: 9.2 MG/DL
CHLORIDE SERPL-SCNC: 100 MMOL/L
CHLORIDE SERPL-SCNC: 100 MMOL/L
CO2 SERPL-SCNC: 30 MMOL/L
CO2 SERPL-SCNC: 30 MMOL/L
CREAT SERPL-MCNC: 2.5 MG/DL
CREAT SERPL-MCNC: 2.5 MG/DL
EST. GFR  (AFRICAN AMERICAN): 30 ML/MIN/1.73 M^2
EST. GFR  (AFRICAN AMERICAN): 30 ML/MIN/1.73 M^2
EST. GFR  (NON AFRICAN AMERICAN): 26 ML/MIN/1.73 M^2
EST. GFR  (NON AFRICAN AMERICAN): 26 ML/MIN/1.73 M^2
GLUCOSE SERPL-MCNC: 92 MG/DL
GLUCOSE SERPL-MCNC: 92 MG/DL
PHOSPHATE SERPL-MCNC: 3.9 MG/DL
POTASSIUM SERPL-SCNC: 3.7 MMOL/L
POTASSIUM SERPL-SCNC: 3.7 MMOL/L
PROT SERPL-MCNC: 7.6 G/DL
PTH-INTACT SERPL-MCNC: 199 PG/ML
SODIUM SERPL-SCNC: 143 MMOL/L
SODIUM SERPL-SCNC: 143 MMOL/L

## 2018-08-10 PROCEDURE — 80069 RENAL FUNCTION PANEL: CPT

## 2018-08-10 PROCEDURE — 83880 ASSAY OF NATRIURETIC PEPTIDE: CPT

## 2018-08-10 PROCEDURE — 80053 COMPREHEN METABOLIC PANEL: CPT

## 2018-08-10 PROCEDURE — 83970 ASSAY OF PARATHORMONE: CPT

## 2018-08-10 PROCEDURE — 36415 COLL VENOUS BLD VENIPUNCTURE: CPT | Mod: PO

## 2018-08-14 DIAGNOSIS — I50.22 CHRONIC SYSTOLIC CONGESTIVE HEART FAILURE: ICD-10-CM

## 2018-08-14 RX ORDER — METOLAZONE 2.5 MG/1
TABLET ORAL
Qty: 30 TABLET | Refills: 4 | Status: SHIPPED | OUTPATIENT
Start: 2018-08-14 | End: 2018-08-17 | Stop reason: SDUPTHER

## 2018-08-17 ENCOUNTER — OFFICE VISIT (OUTPATIENT)
Dept: NEPHROLOGY | Facility: CLINIC | Age: 63
End: 2018-08-17
Payer: MEDICARE

## 2018-08-17 VITALS
DIASTOLIC BLOOD PRESSURE: 60 MMHG | HEIGHT: 68 IN | WEIGHT: 218.94 LBS | HEART RATE: 62 BPM | SYSTOLIC BLOOD PRESSURE: 110 MMHG | BODY MASS INDEX: 33.18 KG/M2

## 2018-08-17 DIAGNOSIS — N18.4 CKD (CHRONIC KIDNEY DISEASE) STAGE 4, GFR 15-29 ML/MIN: Primary | ICD-10-CM

## 2018-08-17 DIAGNOSIS — R80.9 PROTEINURIA DUE TO TYPE 2 DIABETES MELLITUS: ICD-10-CM

## 2018-08-17 DIAGNOSIS — N25.81 HYPERPARATHYROIDISM, SECONDARY RENAL: ICD-10-CM

## 2018-08-17 DIAGNOSIS — E11.29 PROTEINURIA DUE TO TYPE 2 DIABETES MELLITUS: ICD-10-CM

## 2018-08-17 PROCEDURE — 99213 OFFICE O/P EST LOW 20 MIN: CPT | Mod: PBBFAC,PO | Performed by: INTERNAL MEDICINE

## 2018-08-17 PROCEDURE — 99999 PR PBB SHADOW E&M-EST. PATIENT-LVL III: CPT | Mod: PBBFAC,,, | Performed by: INTERNAL MEDICINE

## 2018-08-17 PROCEDURE — 99214 OFFICE O/P EST MOD 30 MIN: CPT | Mod: S$PBB,,, | Performed by: INTERNAL MEDICINE

## 2018-08-17 NOTE — PROGRESS NOTES
PROGRESS NOTE FOR ESTABLISHED PATIENT    PHYSICIAN REQUESTING THE CONSULT: Dr. Sandra Estevez    REASON FOR VISIT: Renal insufficiency    63 y.o. male with history of CKD 3/4, HTN, CHF, anemia, DM2, CAD, pulmonary hypertension presents to the renal clinic for evaluation of renal insufficiency.       Patient reports SOB with exercise, no LE edema.         Past Medical History:   Diagnosis Date    Arthritis     CAD (coronary artery disease)     Cataract     CHF (congestive heart failure)     Dr Calvillo    Chronic combined systolic and diastolic congestive heart failure 3/24/2015    CKD (chronic kidney disease), stage III     Diabetes mellitus type II      AM    Diabetic retinopathy     anti Veg F injections for macular edema    Diverticulitis of colon     ED (erectile dysfunction)     Glaucoma     HTN (hypertension)     Hyperlipidemia     Ischemic cardiomyopathy     Obesity     Pulmonary HTN        Past Surgical History:   Procedure Laterality Date    CARDIAC CATHETERIZATION  2009    CHOLECYSTECTOMY      COLONOSCOPY      EYE SURGERY      KNEE SURGERY         Review of patient's allergies indicates:  No Known Allergies    Current Outpatient Medications   Medication Sig Dispense Refill    allopurinol (ZYLOPRIM) 100 MG tablet TAKE 1 TABLET (100 MG TOTAL) BY MOUTH ONCE DAILY. 90 tablet 0    aspirin (ECOTRIN) 81 MG EC tablet Take 81 mg by mouth once daily.      BD INSULIN SYRINGE ULTRA-FINE 0.5 mL 31 gauge x 5/16 Syrg USE AS DIRECTED TO INJECT INSULIN TWO TIMES DAILY 60 each 11    bimatoprost (LUMIGAN) 0.03 % ophthalmic drops Place 1 drop into the left eye every evening. 2.5 mL 11    blood sugar diagnostic Strp 1 strip by Misc.(Non-Drug; Combo Route) route 4 (four) times daily. Telcare 120 strip 11    bumetanide (BUMEX) 2 MG tablet Take 1 tablet (2 mg total) by mouth 2 (two) times daily. (Patient taking differently: Take 2 mg by mouth once daily. ) 75 tablet 11    carvedilol (COREG) 25 MG  "tablet TAKE 1/2 TABLET BY MOUTH TWICE A DAY WITH MEALS 30 tablet 9    insulin glargine (LANTUS) 100 unit/mL injection 50 units bid prn when BS< 150 30 mL 11    ipratropium-albuterol (COMBIVENT)  mcg/actuation inhaler Inhale 1 puff into the lungs every 6 (six) hours as needed for Wheezing or Shortness of Breath. Rescue 1 Package 0    IRON/VITAMIN B COMPLEX (GERITOL ORAL) Take by mouth.      lancets [facility] 120 each 11    lisinopril (PRINIVIL,ZESTRIL) 5 MG tablet TAKE 1 TABLET (5 MG TOTAL) BY MOUTH ONCE DAILY. 90 tablet 3    metOLazone (ZAROXOLYN) 2.5 MG tablet Take 1 tablet (2.5 mg total) by mouth every Mon, Wed, Fri. 12 tablet 11    pantoprazole (PROTONIX) 40 MG tablet TAKE 1 TABLET BY MOUTH EVERY DAY FOR ACID REFLUX 90 tablet 1    pen needle, diabetic (BD ULTRA-FINE JOSLYN PEN NEEDLE) 32 gauge x 5/32" Ndle 1 each by Misc.(Non-Drug; Combo Route) route 4 (four) times daily. 100 each 11    potassium chloride SA (K-DUR,KLOR-CON) 10 MEQ tablet TAKE 1 TABLET BY MOUTH EVERY DAY 90 tablet 2    simvastatin (ZOCOR) 10 MG tablet TAKE 1 TABLET (10 MG TOTAL) BY MOUTH EVERY EVENING. 30 tablet 7     No current facility-administered medications for this visit.        Family History   Problem Relation Age of Onset    Cancer Mother     Heart disease Father     Stroke Father     No Known Problems Sister     No Known Problems Brother     No Known Problems Maternal Aunt     No Known Problems Maternal Uncle     No Known Problems Paternal Aunt     No Known Problems Paternal Uncle     No Known Problems Maternal Grandmother     No Known Problems Maternal Grandfather     No Known Problems Paternal Grandmother     No Known Problems Paternal Grandfather     Amblyopia Neg Hx     Blindness Neg Hx     Cataracts Neg Hx     Diabetes Neg Hx     Glaucoma Neg Hx     Hypertension Neg Hx     Macular degeneration Neg Hx     Retinal detachment Neg Hx     Strabismus Neg Hx     Thyroid disease Neg Hx  " "      Social History     Socioeconomic History    Marital status:      Spouse name: Not on file    Number of children: 2    Years of education: Not on file    Highest education level: Not on file   Social Needs    Financial resource strain: Not on file    Food insecurity - worry: Not on file    Food insecurity - inability: Not on file    Transportation needs - medical: Not on file    Transportation needs - non-medical: Not on file   Occupational History    Occupation: [employer]     Employer: [email or web address]   Tobacco Use    Smoking status: Never Smoker    Smokeless tobacco: Never Used   Substance and Sexual Activity    Alcohol use: Yes     Alcohol/week: 0.0 oz     Comment: " once in awhile...special occassions"    Drug use: No    Sexual activity: Not Currently   Other Topics Concern    Not on file   Social History Narrative    . Lives with spouse. Has 2 children. 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       Review of Systems:  1. GENERAL: patient denies any fever, weight changes, generalized weakness, dizziness.  2. HEENT: patient denies headaches, visual disturbances, swallowing problems, sinus pain, nasal congestion.  3. CARDIOVASCULAR: patient denies chest pain, palpitations.  4. PULMONARY: patient reports SOB with exercise, no coughing, hemoptysis, wheezing.  5. GASTROINTESTINAL: patient denies abdominal pain, nausea, vomiting, diarrhea.  6. GENITOURINARY: patient denies dysuria, hematuria, hesitancy, frequency.  7. EXTREMITIES: patient denies LE edema, no LE cramping.  8. DERMATOLOGY: patient denies rashes, ulcers.  9. NEURO: patient denies tremors, extremity weakness, extremity numbness/tingling.  10. MUSCULOSKELETAL: patient denies joint pain, joint swelling.  11. HEMATOLOGY: patient denies rectal or gum bleeding.  12: PSYCH: patient denies anxiety, depression.      PHYSICAL EXAM:  /60   Pulse 62   Ht 5' 8" (1.727 m)   Wt 99.3 kg " (218 lb 14.7 oz)   BMI 33.29 kg/m²     GENERAL: Pleasant gentleman presents to clinic with non-labored breathing.  HEENT: PER, no nasal discharge, no icterus, no oral exudates, moist mucosal membranes.  NECK: no thyroid mass, no lymphadenopathy.  HEART: RRR S1/S2, no rubs, good peripheral pulses.  LUNGS: CTA bilaterally, no wheezing, breathing is nonlabored.  ABDOMEN: soft, nontender, not distended, bowel sounds are present, no abdominal hernia, colostomy in place  EXTREM: No LE edema.  SKIN: no rashes, skin is warm and dry.  NEURO: A & O x 3, no obvious focal signs.    LABORATORY RESULTS:    Lab Results   Component Value Date    CREATININE 2.5 (H) 08/10/2018    CREATININE 2.5 (H) 08/10/2018    BUN 37 (H) 08/10/2018    BUN 37 (H) 08/10/2018     08/10/2018     08/10/2018    K 3.7 08/10/2018    K 3.7 08/10/2018     08/10/2018     08/10/2018    CO2 30 (H) 08/10/2018    CO2 30 (H) 08/10/2018      Lab Results   Component Value Date    .0 (H) 08/10/2018    CALCIUM 9.2 08/10/2018    CALCIUM 9.2 08/10/2018    PHOS 3.9 08/10/2018     Lab Results   Component Value Date    ALBUMIN 3.1 (L) 08/10/2018    ALBUMIN 3.1 (L) 08/10/2018     Lab Results   Component Value Date    WBC 6.43 12/21/2017    HGB 14.1 12/21/2017    HCT 43.2 12/21/2017    MCV 89 12/21/2017     (L) 12/21/2017     Protein Creatinine Ratios:    Creatinine, Random Ur   Date Value Ref Range Status   08/10/2018 104.0 23.0 - 375.0 mg/dL Final     Comment:     The random urine reference ranges provided were established   for 24 hour urine collections.  No reference ranges exist for  random urine specimens.  Correlate clinically.     05/18/2018 127.0 23.0 - 375.0 mg/dL Final     Comment:     The random urine reference ranges provided were established   for 24 hour urine collections.  No reference ranges exist for  random urine specimens.  Correlate clinically.     05/02/2018 205.0 23.0 - 375.0 mg/dL Final     Comment:     The  random urine reference ranges provided were established   for 24 hour urine collections.  No reference ranges exist for  random urine specimens.  Correlate clinically.       Protein, Urine Random   Date Value Ref Range Status   08/10/2018 37 (H) 0 - 15 mg/dL Final     Comment:     The random urine reference ranges provided were established   for 24 hour urine collections.  No reference ranges exist for  random urine specimens.  Correlate clinically.     05/18/2018 20 (H) 0 - 15 mg/dL Final     Comment:     The random urine reference ranges provided were established   for 24 hour urine collections.  No reference ranges exist for  random urine specimens.  Correlate clinically.     06/13/2016 20 (H) 0 - 15 mg/dL Final     Comment:     The random urine reference ranges provided were established   for 24 hour urine collections.  No reference ranges exist for  random urine specimens.  Correlate clinically.       Prot/Creat Ratio, Ur   Date Value Ref Range Status   08/10/2018 0.36 (H) 0.00 - 0.20 Final   05/18/2018 0.16 0.00 - 0.20 Final   06/13/2016 0.11 0.00 - 0.20 Final             ASSESSMENT AND PLAN:  63 y.o. male with history of CKD 3/4, HTN, CHF, anemia, DM2, CAD, pulmonary hypertension presents to the renal clinic for evaluation of renal insufficiency.     1. Renal insufficiency: Patient presents with renal insufficiency, consistent with CKD stage 3/4. Patient likely suffers from cardiorenal syndrome (EF 20-25%) and creatinine has been fluctuating (in addition to diabetic nephropathy). His creatinine has decreased from 2.3 to 2.5 since last visit. Will continue Lisinopril for proteinuria.  Patient's renal function will be monitored closely and he will return to the clinic in 3 months for follow up. Patient is on fluid restrictions (about 40-50 ounces per day).     2. Electrolytes: Within normal limits.    3. Acid base status: No acute issues.    4. Volume: Mild LE edema. Continue Bumex.     5. Hypertension: BP  borderline low (consequences of poor cardiac function). He denies any hypotension-related symptoms.      6. Medications: Reviewed. Agree with current medical regimen. Continue Lisinopril.    7. DM2: well controlled with last HgA1c at 6.5 (5/2/18).     8. CAD/CHF: As per Cardiology (Dr. Calvillo).     9. Hyperparathyroidism: vitamin D OTC.

## 2018-09-06 ENCOUNTER — CLINICAL SUPPORT (OUTPATIENT)
Dept: CARDIOLOGY | Facility: CLINIC | Age: 63
End: 2018-09-06
Attending: INTERNAL MEDICINE
Payer: MEDICARE

## 2018-09-06 ENCOUNTER — OFFICE VISIT (OUTPATIENT)
Dept: SURGERY | Facility: CLINIC | Age: 63
End: 2018-09-06
Payer: MEDICARE

## 2018-09-06 ENCOUNTER — OFFICE VISIT (OUTPATIENT)
Dept: UROLOGY | Facility: CLINIC | Age: 63
End: 2018-09-06
Payer: MEDICARE

## 2018-09-06 VITALS
HEART RATE: 64 BPM | TEMPERATURE: 98 F | DIASTOLIC BLOOD PRESSURE: 63 MMHG | HEIGHT: 68 IN | BODY MASS INDEX: 32.14 KG/M2 | WEIGHT: 212.06 LBS | SYSTOLIC BLOOD PRESSURE: 107 MMHG

## 2018-09-06 VITALS
BODY MASS INDEX: 32.41 KG/M2 | DIASTOLIC BLOOD PRESSURE: 80 MMHG | HEIGHT: 68 IN | WEIGHT: 213.88 LBS | SYSTOLIC BLOOD PRESSURE: 122 MMHG

## 2018-09-06 DIAGNOSIS — K43.5 PARASTOMAL HERNIA WITHOUT OBSTRUCTION OR GANGRENE: ICD-10-CM

## 2018-09-06 DIAGNOSIS — N52.9 ERECTILE DYSFUNCTION, UNSPECIFIED ERECTILE DYSFUNCTION TYPE: Primary | ICD-10-CM

## 2018-09-06 DIAGNOSIS — Z93.3 COLOSTOMY IN PLACE: Primary | ICD-10-CM

## 2018-09-06 DIAGNOSIS — Z95.810 ICD (IMPLANTABLE CARDIOVERTER-DEFIBRILLATOR) IN PLACE: ICD-10-CM

## 2018-09-06 DIAGNOSIS — I25.5 ISCHEMIC CARDIOMYOPATHY: ICD-10-CM

## 2018-09-06 DIAGNOSIS — I50.42 CHRONIC COMBINED SYSTOLIC AND DIASTOLIC HEART FAILURE: ICD-10-CM

## 2018-09-06 PROCEDURE — 99999 PR PBB SHADOW E&M-EST. PATIENT-LVL II: CPT | Mod: PBBFAC,,, | Performed by: UROLOGY

## 2018-09-06 PROCEDURE — 99212 OFFICE O/P EST SF 10 MIN: CPT | Mod: S$PBB,,, | Performed by: SURGERY

## 2018-09-06 PROCEDURE — 99212 OFFICE O/P EST SF 10 MIN: CPT | Mod: PBBFAC,25 | Performed by: UROLOGY

## 2018-09-06 PROCEDURE — 93290 INTERROG DEV EVAL ICPMS IP: CPT | Mod: 26,S$PBB,, | Performed by: INTERNAL MEDICINE

## 2018-09-06 PROCEDURE — 99213 OFFICE O/P EST LOW 20 MIN: CPT | Mod: PBBFAC,27 | Performed by: SURGERY

## 2018-09-06 PROCEDURE — 99214 OFFICE O/P EST MOD 30 MIN: CPT | Mod: S$PBB,,, | Performed by: UROLOGY

## 2018-09-06 PROCEDURE — 99999 PR PBB SHADOW E&M-EST. PATIENT-LVL III: CPT | Mod: PBBFAC,,, | Performed by: SURGERY

## 2018-09-06 PROCEDURE — 93283 PRGRMG EVAL IMPLANTABLE DFB: CPT | Mod: PBBFAC | Performed by: INTERNAL MEDICINE

## 2018-09-06 RX ORDER — PAPAVERINE HYDROCHLORIDE 30 MG/ML
INJECTION INTRAMUSCULAR; INTRAVENOUS
Qty: 10 ML | Refills: 5 | OUTPATIENT
Start: 2018-09-06 | End: 2018-09-06 | Stop reason: SDUPTHER

## 2018-09-06 RX ORDER — PAPAVERINE HYDROCHLORIDE 30 MG/ML
INJECTION INTRAMUSCULAR; INTRAVENOUS
Qty: 10 ML | Refills: 5 | Status: ON HOLD | OUTPATIENT
Start: 2018-09-06 | End: 2018-10-11 | Stop reason: HOSPADM

## 2018-09-06 RX ORDER — VIT C/E/ZN/COPPR/LUTEIN/ZEAXAN 250MG-90MG
1000 CAPSULE ORAL DAILY
COMMUNITY

## 2018-09-06 NOTE — PROGRESS NOTES
Chief Complaint   Patient presents with    Abdominal Pain       HISTORY OF PRESENT ILLNESS: Yan Choudhury is a 63 y.o. male c/o pain around ostomy site.  He has had a known parastomal hernia for over a year.    REVIEW OF SYSTEMS:  CARDIOVASCULAR: Denies chest pain, PND, orthopnea or reduced exercise tolerance.  RESPIRATORY: Denies LIM, cyanosis, wheezing, cough and sputum production.    PHYSICAL EXAMINATION:  CONSTITUTIONAL:  Well-nourished, well developed, in no acute distress.  CARDIOVASCULAR:  No extremity edema or varicosities.  RESPIRATORY:   Good respiratory effort.  MUSCULOSKELETAL:  Normal strength and tone in all four extremities.  SKIN:  No rashes or lesions, no induration or nodules by inspection.  NEUROLOGIC:  Normal gait.  PSYCH:  Sound judgment and insight, oriented to time, place, and person.  ABD:  Parastomal hernia with some dermatitis around colostomy bag    ASSESSMENT/PLAN:    1. Colostomy in place     2. Parastomal hernia without obstruction or gangrene       Patient Instructions   Recommend abdominal binder.  OK to cut hole for colostomy bag.    The patient was previously seen by Dr. Lindsay and he felt that the risk of surgery outweighed the benefits.

## 2018-09-06 NOTE — PROGRESS NOTES
Chief Complaint: ED    HPI:   9/6/18: Same problems with ED as last visit.  Breathing better now, feels like he is in better health.  Has had a defibrillator placed and a colostomy for diverticulitis.  Discussed PDE-5 failure.  [facility] trimix.  11/03/15: 61 yo man referred by Dr. Estevez for ED.  No abd/pelvic pain and no exac/rel factors.  No hematuria.  No urolithiasis.  No urinary bother.  No prior  history.  Normal climax.  No erections at all.  If he didn't have ED he would have sex three times a week.  Has tried cialis but it didn't work, neither did viagra.  Did it on an empty stomach no alcohol - no effect.  He is very short winded but says he can go up stairs and exercise some - feels he can safely have sex.  Coughing more than usual, feeling very tired.  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    Allergies:  Patient has no known allergies.    Medications:  has a current medication list which includes the following prescription(s): allopurinol, aspirin, bd insulin syringe ultra-fine, bimatoprost, blood sugar diagnostic, bumetanide, carvedilol, cholecalciferol (vitamin d3), insulin glargine, iron/vitamin b complex, lancets, lisinopril, metolazone, pantoprazole, pen needle, diabetic, potassium chloride sa, and simvastatin.    Review of Systems:  General: No fever, chills, fatigability, or weight loss.  Skin: No rashes, itching, or changes in color or texture of skin.  Chest: +LIM, -cyanosis, -wheezing, +cough, and sputum production.  Abdomen: Appetite fine. No weight loss. Denies diarrhea, abdominal pain, hematemesis, or blood in stool.  Musculoskeletal: No joint stiffness or swelling. Denies back pain.  : As above.  All other review of systems negative.    PMH:   has a past medical history of Arthritis, CAD (coronary artery disease), Cataract, CHF (congestive heart failure), Chronic combined systolic and diastolic congestive heart failure (3/24/2015), CKD (chronic kidney disease), stage  III, Diabetes mellitus type II, Diabetic retinopathy, Diverticulitis of colon, ED (erectile dysfunction), Glaucoma, HTN (hypertension), Hyperlipidemia, Ischemic cardiomyopathy, Obesity, and Pulmonary HTN.    PSH:   has a past surgical history that includes Eye surgery; Knee surgery; Cardiac catheterization (2009); Cholecystectomy; Colonoscopy; COLECTOMY-SIGMOID (N/A, 4/22/2016); and COLOSTOMY (N/A, 4/22/2016).    FamHx: family history includes Cancer in his mother; Heart disease in his father; No Known Problems in his brother, maternal aunt, maternal grandfather, maternal grandmother, maternal uncle, paternal aunt, paternal grandfather, paternal grandmother, paternal uncle, and sister; Stroke in his father.    SocHx:  reports that  has never smoked. he has never used smokeless tobacco. He reports that he drinks alcohol. He reports that he does not use drugs.      Physical Exam:  Vitals:    09/06/18 1502   BP: 122/80     General: A&Ox3, no apparent distress, no deformities  Neck: No masses, normal thyroid  Lungs: normal inspiration, no use of accessory muscles  Heart: normal pulse, no arrhythmias  Abdomen: Soft, NT, ND, no masses, no hernias, no hepatosplenomegaly  Lymphatic: Neck and groin nodes negative  Skin: The skin is warm and dry. No jaundice.  Ext: No c/c/e.  : Test desc loyd, no abnormalities of epididymus. Penis normal, with normal penile and scrotal skin. Meatus normal. BENIGNO normal.    Labs/Studies:   PSA    6/15: 0.63    5/18: 0.82    Impression/Plan:   1. PDE-5 inhib have failed.  Trimix rx with instructions/demo today.  RTC 1 year.

## 2018-09-06 NOTE — PROGRESS NOTES
..Trimix teaching per Dr. Rivera. Explained to pt  how to self administer trimix by first gathering supplies, wash hand, clean cap of medicine vial, draw up medicine  cleanse base of penis with alcohol wipe and using aseptic technique administer medication at base of penis. Pt verbalized understanding.

## 2018-09-11 ENCOUNTER — OFFICE VISIT (OUTPATIENT)
Dept: SURGERY | Facility: CLINIC | Age: 63
End: 2018-09-11
Payer: MEDICARE

## 2018-09-11 VITALS
HEART RATE: 66 BPM | TEMPERATURE: 99 F | WEIGHT: 209 LBS | DIASTOLIC BLOOD PRESSURE: 52 MMHG | BODY MASS INDEX: 31.78 KG/M2 | SYSTOLIC BLOOD PRESSURE: 74 MMHG

## 2018-09-11 DIAGNOSIS — S31.109A OPEN WOUND OF ANTERIOR ABDOMINAL WALL, INITIAL ENCOUNTER: ICD-10-CM

## 2018-09-11 DIAGNOSIS — Z93.3 COLOSTOMY IN PLACE: Primary | ICD-10-CM

## 2018-09-11 DIAGNOSIS — K43.5 PARASTOMAL HERNIA WITHOUT OBSTRUCTION OR GANGRENE: ICD-10-CM

## 2018-09-11 PROCEDURE — 99214 OFFICE O/P EST MOD 30 MIN: CPT | Mod: PBBFAC,PO | Performed by: PHYSICIAN ASSISTANT

## 2018-09-11 PROCEDURE — 99999 PR PBB SHADOW E&M-EST. PATIENT-LVL IV: CPT | Mod: PBBFAC,,, | Performed by: PHYSICIAN ASSISTANT

## 2018-09-11 PROCEDURE — 99212 OFFICE O/P EST SF 10 MIN: CPT | Mod: S$PBB,,, | Performed by: PHYSICIAN ASSISTANT

## 2018-09-11 NOTE — PROGRESS NOTES
Chief Complaint   Patient presents with    Follow-up       HISTORY OF PRESENT ILLNESS: Yan Choudhury is a 63 y.o. male c/o pain and open wound around ostomy site. His colostomy is functioning well. He has had a known parastomal hernia for over a year. He has discussed colostomy reversal with Dr. Lindsay in the past but was felt to be too high risk.     REVIEW OF SYSTEMS:  CARDIOVASCULAR: Denies chest pain, PND, orthopnea or reduced exercise tolerance.  RESPIRATORY: Denies LIM, cyanosis, wheezing, cough and sputum production.    PHYSICAL EXAMINATION:  CONSTITUTIONAL:  Well-nourished, well developed, in no acute distress.  CARDIOVASCULAR:  No extremity edema or varicosities.  RESPIRATORY:   Good respiratory effort.  MUSCULOSKELETAL:  Normal strength and tone in all four extremities.  SKIN:  No rashes or lesions, no induration or nodules by inspection.  NEUROLOGIC:  Normal gait.  PSYCH:  Sound judgment and insight, oriented to time, place, and person.  ABD:  Parastomal hernia with open wound on left side of colostomy, 0.5cm lateral to stoma. Small amount of purulent fluid in area. Open wound is overlying the parastomal hernia. Colostomy functioning well.     ASSESSMENT/PLAN:  Parastomal hernia  Possible fistula with erosion of skin adjacent to stoma.   Pt is at high risk for surgery. The patient was seen with Dr. Márquez who feels that he will need surgery, possibly colostomy reversal. He recommends that the patient return to see Dr. Lindsay in near future to discuss further.

## 2018-09-13 ENCOUNTER — OFFICE VISIT (OUTPATIENT)
Dept: SURGERY | Facility: CLINIC | Age: 63
End: 2018-09-13
Payer: MEDICARE

## 2018-09-13 VITALS
SYSTOLIC BLOOD PRESSURE: 79 MMHG | BODY MASS INDEX: 31.81 KG/M2 | DIASTOLIC BLOOD PRESSURE: 58 MMHG | HEART RATE: 77 BPM | TEMPERATURE: 98 F | HEIGHT: 68 IN | WEIGHT: 209.88 LBS

## 2018-09-13 DIAGNOSIS — K43.5 PARASTOMAL HERNIA WITHOUT OBSTRUCTION OR GANGRENE: ICD-10-CM

## 2018-09-13 DIAGNOSIS — S31.109A OPEN WOUND OF ANTERIOR ABDOMINAL WALL, INITIAL ENCOUNTER: ICD-10-CM

## 2018-09-13 DIAGNOSIS — Z93.3 COLOSTOMY IN PLACE: Primary | ICD-10-CM

## 2018-09-13 DIAGNOSIS — I25.10 CAD, MULTIPLE VESSEL: ICD-10-CM

## 2018-09-13 PROCEDURE — 99213 OFFICE O/P EST LOW 20 MIN: CPT | Mod: PBBFAC,PO | Performed by: SURGERY

## 2018-09-13 PROCEDURE — 99212 OFFICE O/P EST SF 10 MIN: CPT | Mod: S$PBB,,, | Performed by: SURGERY

## 2018-09-13 PROCEDURE — 99999 PR PBB SHADOW E&M-EST. PATIENT-LVL III: CPT | Mod: PBBFAC,,, | Performed by: SURGERY

## 2018-09-13 NOTE — PROGRESS NOTES
Patient ID: Yan Choudhury is a 63 y.o. male.    Colostomy issues    Chief Complaint: Follow-up (check colostomy)      HPI:  The patient has a colostomy.  The colostomy was necessary due to perforated diverticulitis.  He has developed a parastomal hernia.  He now has some skin breakdown and exposed subcutaneous tissues on the lateral aspect of his colostomy site.    Patient's biggest health issue is that he has a cardiac ejection fraction in the 20-25% range.    He was seen in clinic by 1 of my partners who recommended that we consider colostomy reversal.  He presents now for his colostomy issues and to discuss potential reversal        Review of Systems   Constitutional: Negative.    HENT: Negative.    Eyes: Negative.    Respiratory: Positive for shortness of breath.    Cardiovascular: Negative.    Gastrointestinal:        Colostomy with skin breakdown laterally and parastomal hernia   Endocrine: Negative.    Genitourinary: Negative.    Musculoskeletal: Negative.    Skin: Negative.    Allergic/Immunologic: Negative.    Neurological: Negative.    Hematological: Negative.    Psychiatric/Behavioral: Negative.        Current Outpatient Medications   Medication Sig Dispense Refill    allopurinol (ZYLOPRIM) 100 MG tablet TAKE 1 TABLET (100 MG TOTAL) BY MOUTH ONCE DAILY. 90 tablet 0    aspirin (ECOTRIN) 81 MG EC tablet Take 81 mg by mouth once daily.      BD INSULIN SYRINGE ULTRA-FINE 0.5 mL 31 gauge x 5/16 Syrg USE AS DIRECTED TO INJECT INSULIN TWO TIMES DAILY 60 each 11    bimatoprost (LUMIGAN) 0.03 % ophthalmic drops Place 1 drop into the left eye every evening. 2.5 mL 11    blood sugar diagnostic Strp 1 strip by Misc.(Non-Drug; Combo Route) route 4 (four) times daily. Telcare 120 strip 11    bumetanide (BUMEX) 2 MG tablet Take 1 tablet (2 mg total) by mouth 2 (two) times daily. (Patient taking differently: Take 2 mg by mouth once daily. ) 75 tablet 11    carvedilol (COREG) 25 MG tablet TAKE 1/2 TABLET BY MOUTH  "TWICE A DAY WITH MEALS 30 tablet 9    cholecalciferol, vitamin D3, (VITAMIN D3) 1,000 unit capsule Take 1,000 Units by mouth once daily.      insulin glargine (LANTUS) 100 unit/mL injection 50 units bid prn when BS< 150 30 mL 11    IRON/VITAMIN B COMPLEX (GERITOL ORAL) Take by mouth.      lancets [facility] For tel care 120 each 11    lisinopril (PRINIVIL,ZESTRIL) 5 MG tablet TAKE 1 TABLET (5 MG TOTAL) BY MOUTH ONCE DAILY. 90 tablet 3    metOLazone (ZAROXOLYN) 2.5 MG tablet Take 1 tablet (2.5 mg total) by mouth every Mon, Wed, Fri. 12 tablet 11    pantoprazole (PROTONIX) 40 MG tablet TAKE 1 TABLET BY MOUTH EVERY DAY FOR ACID REFLUX 90 tablet 1    papaverine 30 mg/mL injection Inject 0.3 ml of trimix solution prn ED max once daily  Prepare 10ml of trimix solution containing:    Papaverine 30mg/ml    Phentolamine 1 mg/ml    Alprostadil 10mcg/ml  Dispense 10ml per refill  Qs syringes 1cc/30g/0.5" and alcohol swabs dispense as needed for Intracavernosal injection 10 mL 5    pen needle, diabetic (BD ULTRA-FINE JOSLYN PEN NEEDLE) 32 gauge x 5/32" Ndle 1 each by Misc.(Non-Drug; Combo Route) route 4 (four) times daily. 100 each 11    potassium chloride SA (K-DUR,KLOR-CON) 10 MEQ tablet TAKE 1 TABLET BY MOUTH EVERY DAY 90 tablet 2    simvastatin (ZOCOR) 10 MG tablet TAKE 1 TABLET (10 MG TOTAL) BY MOUTH EVERY EVENING. 30 tablet 7     No current facility-administered medications for this visit.        Review of patient's allergies indicates:  No Known Allergies    Past Medical History:   Diagnosis Date    Arthritis     CAD (coronary artery disease)     Cataract     CHF (congestive heart failure)     Dr Calvillo    Chronic combined systolic and diastolic congestive heart failure 3/24/2015    CKD (chronic kidney disease), stage III     Diabetes mellitus type II      AM    Diabetic retinopathy     anti Veg F injections for macular edema    Diverticulitis of colon     ED (erectile dysfunction)     Glaucoma     " "HTN (hypertension)     Hyperlipidemia     Ischemic cardiomyopathy     Obesity     Pulmonary HTN        Past Surgical History:   Procedure Laterality Date    CARDIAC CATHETERIZATION  2009    CHOLECYSTECTOMY      COLECTOMY-SIGMOID N/A 4/22/2016    [facility]    COLONOSCOPY      COLOSTOMY N/A 4/22/2016    [facility]    EYE SURGERY      KNEE SURGERY         Family History   Problem Relation Age of Onset    Cancer Mother     Heart disease Father     Stroke Father     No Known Problems Sister     No Known Problems Brother     No Known Problems Maternal Aunt     No Known Problems Maternal Uncle     No Known Problems Paternal Aunt     No Known Problems Paternal Uncle     No Known Problems Maternal Grandmother     No Known Problems Maternal Grandfather     No Known Problems Paternal Grandmother     No Known Problems Paternal Grandfather     Amblyopia Neg Hx     Blindness Neg Hx     Cataracts Neg Hx     Diabetes Neg Hx     Glaucoma Neg Hx     Hypertension Neg Hx     Macular degeneration Neg Hx     Retinal detachment Neg Hx     Strabismus Neg Hx     Thyroid disease Neg Hx        Social History     Socioeconomic History    Marital status:      Spouse name: Not on file    Number of children: 2    Years of education: Not on file    Highest education level: Not on file   Social Needs    Financial resource strain: Not on file    Food insecurity - worry: Not on file    Food insecurity - inability: Not on file    Transportation needs - medical: Not on file    Transportation needs - non-medical: Not on file   Occupational History    Occupation: [employer]     Employer: [employer]   Tobacco Use    Smoking status: Never Smoker    Smokeless tobacco: Never Used   Substance and Sexual Activity    Alcohol use: Yes     Alcohol/week: 0.0 oz     Comment: " once in awhile...special occassions"    Drug use: No "    Sexual activity: Not Currently   Other Topics Concern    Not on file   Social History Narrative    . Lives with spouse. Has 2 children. 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       Vitals:    09/13/18 1116   BP: (!) 79/58   Pulse: 77   Temp: 98 °F (36.7 °C)       Physical Exam   Constitutional: He appears well-developed and well-nourished. No distress.   HENT:   Head: Normocephalic and atraumatic.   Neck: Normal range of motion. Neck supple.   Cardiovascular: Normal rate and regular rhythm.   Pulmonary/Chest: Effort normal and breath sounds normal.   Abdominal: Soft. Bowel sounds are normal.   There is a small hernia at the umbilicus.  There is a large parastomal hernia.  There is a well-formed left-sided colostomy.  Lateral to the colostomy there is some skin breakdown.  The skin breakdown and exposed granulation tissue was treated with silver nitrate.  Antibiotic ointment was applied   Skin: Skin is warm and dry. Capillary refill takes less than 2 seconds.    abdominal note   Psychiatric: He has a normal mood and affect. His behavior is normal. Judgment and thought content normal.   Vitals reviewed.    Echocardiogram from last in November was an ejection fraction 20-25%    Assessment & Plan:    skin breakdown adjacent to the colostomy.    Antibiotic ointment.  Enlarged the opening in the colostomy bag.    Peristomal hernia at the presence of a colostomy.    Cardiac evaluation for his cardiac risk regarding major abdominal surgery.  We will see the patient back in several weeks.  He is scheduled to see Cardiology on the 24th of this month.

## 2018-09-13 NOTE — PATIENT INSTRUCTIONS
Please apply a small amount of antibiotic ointment to the 2 areas of skin breakdown lateral to the colostomy opening.    Please call for any increased pain redness or drainage.    We will ask her cardiologist evaluate you for your risk of having a cardiac event with major abdominal surgery. If these risk are acceptable to you we could proceed with colostomy reversal

## 2018-09-24 ENCOUNTER — CLINICAL SUPPORT (OUTPATIENT)
Dept: CARDIOLOGY | Facility: CLINIC | Age: 63
End: 2018-09-24
Payer: MEDICARE

## 2018-09-24 ENCOUNTER — OFFICE VISIT (OUTPATIENT)
Dept: CARDIOLOGY | Facility: CLINIC | Age: 63
End: 2018-09-24
Payer: MEDICARE

## 2018-09-24 VITALS
HEART RATE: 91 BPM | DIASTOLIC BLOOD PRESSURE: 58 MMHG | BODY MASS INDEX: 31.98 KG/M2 | HEIGHT: 68 IN | WEIGHT: 211 LBS | SYSTOLIC BLOOD PRESSURE: 104 MMHG

## 2018-09-24 DIAGNOSIS — Z01.810 PREOP CARDIOVASCULAR EXAM: Primary | ICD-10-CM

## 2018-09-24 DIAGNOSIS — E78.5 HYPERLIPIDEMIA ASSOCIATED WITH TYPE 2 DIABETES MELLITUS: ICD-10-CM

## 2018-09-24 DIAGNOSIS — I25.10 CAD, MULTIPLE VESSEL: Chronic | ICD-10-CM

## 2018-09-24 DIAGNOSIS — I15.2 HYPERTENSION ASSOCIATED WITH DIABETES: ICD-10-CM

## 2018-09-24 DIAGNOSIS — I25.5 ISCHEMIC CARDIOMYOPATHY: ICD-10-CM

## 2018-09-24 DIAGNOSIS — N18.4 TYPE 2 DIABETES MELLITUS WITH STAGE 4 CHRONIC KIDNEY DISEASE, WITH LONG-TERM CURRENT USE OF INSULIN: ICD-10-CM

## 2018-09-24 DIAGNOSIS — N18.30 STAGE 3 CHRONIC KIDNEY DISEASE: ICD-10-CM

## 2018-09-24 DIAGNOSIS — I27.20 PULMONARY HTN: ICD-10-CM

## 2018-09-24 DIAGNOSIS — Z79.4 TYPE 2 DIABETES MELLITUS WITH STAGE 4 CHRONIC KIDNEY DISEASE, WITH LONG-TERM CURRENT USE OF INSULIN: ICD-10-CM

## 2018-09-24 DIAGNOSIS — G47.33 OSA ON CPAP: Chronic | ICD-10-CM

## 2018-09-24 DIAGNOSIS — I25.10 CAD (CORONARY ARTERY DISEASE): Primary | ICD-10-CM

## 2018-09-24 DIAGNOSIS — I25.10 CAD (CORONARY ARTERY DISEASE): ICD-10-CM

## 2018-09-24 DIAGNOSIS — E11.22 TYPE 2 DIABETES MELLITUS WITH STAGE 4 CHRONIC KIDNEY DISEASE, WITH LONG-TERM CURRENT USE OF INSULIN: ICD-10-CM

## 2018-09-24 DIAGNOSIS — E11.69 HYPERLIPIDEMIA ASSOCIATED WITH TYPE 2 DIABETES MELLITUS: ICD-10-CM

## 2018-09-24 DIAGNOSIS — I50.42 CHRONIC COMBINED SYSTOLIC AND DIASTOLIC CONGESTIVE HEART FAILURE: ICD-10-CM

## 2018-09-24 DIAGNOSIS — N18.4 CKD (CHRONIC KIDNEY DISEASE) STAGE 4, GFR 15-29 ML/MIN: ICD-10-CM

## 2018-09-24 DIAGNOSIS — I44.7 LBBB (LEFT BUNDLE BRANCH BLOCK): ICD-10-CM

## 2018-09-24 DIAGNOSIS — E11.59 HYPERTENSION ASSOCIATED WITH DIABETES: ICD-10-CM

## 2018-09-24 DIAGNOSIS — Z95.810 BIVENTRICULAR ICD (IMPLANTABLE CARDIOVERTER-DEFIBRILLATOR) IN PLACE: Chronic | ICD-10-CM

## 2018-09-24 PROCEDURE — 93010 ELECTROCARDIOGRAM REPORT: CPT | Mod: S$PBB,,, | Performed by: NUCLEAR MEDICINE

## 2018-09-24 PROCEDURE — 99213 OFFICE O/P EST LOW 20 MIN: CPT | Mod: PBBFAC,PO | Performed by: INTERNAL MEDICINE

## 2018-09-24 PROCEDURE — 99999 PR PBB SHADOW E&M-EST. PATIENT-LVL III: CPT | Mod: PBBFAC,,, | Performed by: INTERNAL MEDICINE

## 2018-09-24 PROCEDURE — 99214 OFFICE O/P EST MOD 30 MIN: CPT | Mod: S$PBB,,, | Performed by: INTERNAL MEDICINE

## 2018-09-24 PROCEDURE — 93005 ELECTROCARDIOGRAM TRACING: CPT | Mod: PBBFAC,PO | Performed by: NUCLEAR MEDICINE

## 2018-09-24 NOTE — PROGRESS NOTES
Subjective:    Patient ID:  Yan Choudhury is a 63 y.o. male who presents for evaluation of Follow-up; Hyperlipidemia; Hypertension; Coronary Artery Disease; Congestive Heart Failure; Risk Factor Management For Atherosclerosis; and Pre-op Exam      HPI Mr. Choudhury returns for f/u.  His current medical conditions include CAD/ICM, DCM, LBBB, HTN, CRI, DM, CRT-ICD,  PTHN, dyslipidemia, obesity. Nonsmoker.   Past hx pertinent for following:  LHC was done 2009 showed diffuse CAD, and severe distal CAD involving apical LAD, distal RCA/distal LCX.   Echo Oct 2010 showed LVEF 25%.   PET stress 2016 showed inferior scar, no significant ischemia noted and LVEF < 35%.   s/p BIV ICD 3/16 for CHF/LBBB.  Echo 12/17 EF 20%, DD, mod PHTN.   Pt here for f/u.  He is being considered for colostomy reversal/parastomal repair.  Pt denies cp sxs.  He has chronic severe CHF.  He has stable LIM.  No pnd/orthopnea.  His weight is stable, down from few months ago when I saw him.  His BNP is very low for him.  CRI stable.  ecg today shows a sensed V paced rhythm, no acute changes.  DM is well controlled with HGAIC at goal.  Lipids well controlled, on statin.   His last surgery 4/16 for diverticulitis and had no cardiac complications despite his CHF/CAD.   BP runs low, no syncope. Some occasional orthostatic dizziness.  Wife holds BP meds if runs real low, < 90 systolic.       Current Outpatient Medications:     allopurinol (ZYLOPRIM) 100 MG tablet, TAKE 1 TABLET (100 MG TOTAL) BY MOUTH ONCE DAILY., Disp: 90 tablet, Rfl: 0    aspirin (ECOTRIN) 81 MG EC tablet, Take 81 mg by mouth once daily., Disp: , Rfl:     BD INSULIN SYRINGE ULTRA-FINE 0.5 mL 31 gauge x 5/16 Syrg, USE AS DIRECTED TO INJECT INSULIN TWO TIMES DAILY, Disp: 60 each, Rfl: 11    bimatoprost (LUMIGAN) 0.03 % ophthalmic drops, Place 1 drop into the left eye every evening., Disp: 2.5 mL, Rfl: 11    blood sugar diagnostic Strp, 1 strip by Misc.(Non-Drug; Combo Route) route 4 (four)  "times daily. Telcare, Disp: 120 strip, Rfl: 11    bumetanide (BUMEX) 2 MG tablet, Take 1 tablet (2 mg total) by mouth 2 (two) times daily. (Patient taking differently: Take 2 mg by mouth once daily. ), Disp: 75 tablet, Rfl: 11    carvedilol (COREG) 25 MG tablet, TAKE 1/2 TABLET BY MOUTH TWICE A DAY WITH MEALS, Disp: 30 tablet, Rfl: 9    cholecalciferol, vitamin D3, (VITAMIN D3) 1,000 unit capsule, Take 1,000 Units by mouth once daily., Disp: , Rfl:     insulin glargine (LANTUS) 100 unit/mL injection, 50 units bid prn when BS< 150, Disp: 30 mL, Rfl: 11    IRON/VITAMIN B COMPLEX (GERITOL ORAL), Take by mouth., Disp: , Rfl:     lancets [facility] [place], Disp: 120 each, Rfl: 11    lisinopril (PRINIVIL,ZESTRIL) 5 MG tablet, TAKE 1 TABLET (5 MG TOTAL) BY MOUTH ONCE DAILY., Disp: 90 tablet, Rfl: 3    metOLazone (ZAROXOLYN) 2.5 MG tablet, Take 1 tablet (2.5 mg total) by mouth every Mon, Wed, Fri., Disp: 12 tablet, Rfl: 11    pantoprazole (PROTONIX) 40 MG tablet, TAKE 1 TABLET BY MOUTH EVERY DAY FOR ACID REFLUX, Disp: 90 tablet, Rfl: 1    papaverine 30 mg/mL injection, Inject 0.3 ml of trimix solution prn ED max once daily Prepare 10ml of trimix solution containing:   Papaverine 30mg/ml   Phentolamine 1 mg/ml   Alprostadil 10mcg/ml Dispense 10ml per refill Qs syringes 1cc/30g/0.5" and alcohol swabs dispense as needed for Intracavernosal injection, Disp: 10 mL, Rfl: 5    pen needle, diabetic (BD ULTRA-FINE JOSLYN PEN NEEDLE) 32 gauge x 5/32" Ndle, 1 each by Misc.(Non-Drug; Combo Route) route 4 (four) times daily., Disp: 100 each, Rfl: 11    potassium chloride SA (K-DUR,KLOR-CON) 10 MEQ tablet, TAKE 1 TABLET BY MOUTH EVERY DAY, Disp: 90 tablet, Rfl: 2    simvastatin (ZOCOR) 10 MG tablet, TAKE 1 TABLET (10 MG TOTAL) BY MOUTH EVERY EVENING., Disp: 30 tablet, Rfl: 7      Review of Systems   Constitution: Positive for malaise/fatigue.   HENT: Negative.    Eyes: Negative.    Cardiovascular: Positive for dyspnea on " "exertion.   Respiratory: Positive for shortness of breath.    Endocrine: Negative.    Hematologic/Lymphatic: Negative.    Skin: Negative.    Musculoskeletal: Positive for arthritis.   Gastrointestinal: Negative.    Genitourinary: Negative.    Neurological: Positive for dizziness and light-headedness.   Psychiatric/Behavioral: Negative.    Allergic/Immunologic: Negative.        BP (!) 104/58 (BP Location: Right arm, Patient Position: Sitting, BP Method: Large (Manual))   Pulse 91   Ht 5' 8" (1.727 m)   Wt 95.7 kg (211 lb)   BMI 32.08 kg/m²     Wt Readings from Last 3 Encounters:   09/24/18 95.7 kg (211 lb)   09/13/18 95.2 kg (209 lb 14.1 oz)   09/11/18 94.8 kg (208 lb 15.9 oz)     Temp Readings from Last 3 Encounters:   09/13/18 98 °F (36.7 °C) (Oral)   09/11/18 98.9 °F (37.2 °C) (Oral)   09/06/18 97.8 °F (36.6 °C) (Oral)     BP Readings from Last 3 Encounters:   09/24/18 (!) 104/58   09/13/18 (!) 79/58   09/11/18 (!) 74/52     Pulse Readings from Last 3 Encounters:   09/24/18 91   09/13/18 77   09/11/18 66          Objective:    Physical Exam   Constitutional: He is oriented to person, place, and time. He appears well-developed and well-nourished.   HENT:   Head: Normocephalic.   Neck: Normal range of motion. Neck supple. Normal carotid pulses, no hepatojugular reflux and no JVD present. Carotid bruit is not present. No thyromegaly present.   Cardiovascular: Normal rate, regular rhythm, S1 normal and S2 normal. PMI is not displaced. Exam reveals no S3, no S4, no distant heart sounds, no friction rub, no midsystolic click and no opening snap.   No murmur heard.  Pulses:       Radial pulses are 2+ on the right side, and 2+ on the left side.   Pulmonary/Chest: Effort normal and breath sounds normal. He has no wheezes. He has no rales.   Abdominal: Soft. Bowel sounds are normal. He exhibits no distension, no abdominal bruit, no ascites and no mass. There is no tenderness.   Obese  Colostomy bag intact left lower " abdomen   Musculoskeletal: He exhibits no edema.   Neurological: He is alert and oriented to person, place, and time.   Skin: Skin is warm.   Psychiatric: He has a normal mood and affect. His behavior is normal.   Nursing note and vitals reviewed.      I have reviewed all pertinent labs and cardiac studies.      Chemistry        Component Value Date/Time     08/10/2018 1010     08/10/2018 1010    K 3.7 08/10/2018 1010    K 3.7 08/10/2018 1010     08/10/2018 1010     08/10/2018 1010    CO2 30 (H) 08/10/2018 1010    CO2 30 (H) 08/10/2018 1010    BUN 37 (H) 08/10/2018 1010    BUN 37 (H) 08/10/2018 1010    CREATININE 2.5 (H) 08/10/2018 1010    CREATININE 2.5 (H) 08/10/2018 1010    GLU 92 08/10/2018 1010    GLU 92 08/10/2018 1010        Component Value Date/Time    CALCIUM 9.2 08/10/2018 1010    CALCIUM 9.2 08/10/2018 1010    ALKPHOS 76 08/10/2018 1010    AST 32 08/10/2018 1010    ALT 18 08/10/2018 1010    BILITOT 0.5 08/10/2018 1010    ESTGFRAFRICA 30.0 (A) 08/10/2018 1010    ESTGFRAFRICA 30.0 (A) 08/10/2018 1010    EGFRNONAA 26.0 (A) 08/10/2018 1010    EGFRNONAA 26.0 (A) 08/10/2018 1010        Lab Results   Component Value Date    WBC 6.43 12/21/2017    HGB 14.1 12/21/2017    HCT 43.2 12/21/2017    MCV 89 12/21/2017     (L) 12/21/2017     Lab Results   Component Value Date    HGBA1C 6.5 (H) 05/02/2018     Lab Results   Component Value Date    CHOL 105 (L) 05/02/2018    CHOL 105 (L) 05/02/2018    CHOL 112 (L) 04/20/2017     Lab Results   Component Value Date    HDL 28 (L) 05/02/2018    HDL 28 (L) 05/02/2018    HDL 39 (L) 04/20/2017     Lab Results   Component Value Date    LDLCALC 52.8 (L) 05/02/2018    LDLCALC 52.8 (L) 05/02/2018    LDLCALC 58.6 (L) 04/20/2017     Lab Results   Component Value Date    TRIG 121 05/02/2018    TRIG 121 05/02/2018    TRIG 72 04/20/2017     Lab Results   Component Value Date    CHOLHDL 26.7 05/02/2018    CHOLHDL 26.7 05/02/2018    CHOLHDL 34.8 04/20/2017            Assessment:       1. Preop cardiovascular exam    2. CAD, multiple vessel    3. Biventricular ICD (implantable cardioverter-defibrillator) in place    4. JAHAIRA on CPAP    5. Hypertension associated with diabetes    6. Stage 3 chronic kidney disease    7. Ischemic cardiomyopathy    8. Pulmonary HTN    9. Hyperlipidemia associated with type 2 diabetes mellitus    10. LBBB (left bundle branch block)    11. CKD (chronic kidney disease) stage 4, GFR 15-29 ml/min    12. Chronic combined systolic and diastolic congestive heart failure    13. Type 2 diabetes mellitus with stage 4 chronic kidney disease, with long-term current use of insulin         Plan:             Chronic stable CV conditions on current medical tx.  Continue OMT for CHF/CAD.  Continue f/u in ICD clinic.   CHF currently compensated.  Pt s/p abdominal surgery 4/16, did ok with no cardiac complications.    Will check echo to make sure everything seems stable and if so, pt may proceed with surgery at moderate to high risk for CV complications.  Certainly if surgery is not strongly indicated, then conservative care/mgt would be prudent with his h/o heart disease and CHF.   Would advise that Cardiology be consulted during day of his admission/surgery to assist the surgical team if needed for any CV complications that may arise.  No changes in meds today.    Phone review for test results.

## 2018-10-01 ENCOUNTER — CLINICAL SUPPORT (OUTPATIENT)
Dept: CARDIOLOGY | Facility: CLINIC | Age: 63
End: 2018-10-01
Attending: INTERNAL MEDICINE
Payer: MEDICARE

## 2018-10-01 DIAGNOSIS — E11.59 HYPERTENSION ASSOCIATED WITH DIABETES: ICD-10-CM

## 2018-10-01 DIAGNOSIS — I27.20 PULMONARY HTN: ICD-10-CM

## 2018-10-01 DIAGNOSIS — N18.4 CKD (CHRONIC KIDNEY DISEASE) STAGE 4, GFR 15-29 ML/MIN: ICD-10-CM

## 2018-10-01 DIAGNOSIS — I50.42 CHRONIC COMBINED SYSTOLIC AND DIASTOLIC CONGESTIVE HEART FAILURE: ICD-10-CM

## 2018-10-01 DIAGNOSIS — I25.10 CAD, MULTIPLE VESSEL: Chronic | ICD-10-CM

## 2018-10-01 DIAGNOSIS — I44.7 LBBB (LEFT BUNDLE BRANCH BLOCK): ICD-10-CM

## 2018-10-01 DIAGNOSIS — I25.5 ISCHEMIC CARDIOMYOPATHY: ICD-10-CM

## 2018-10-01 DIAGNOSIS — I15.2 HYPERTENSION ASSOCIATED WITH DIABETES: ICD-10-CM

## 2018-10-01 DIAGNOSIS — G47.33 OSA ON CPAP: Chronic | ICD-10-CM

## 2018-10-01 DIAGNOSIS — Z95.810 BIVENTRICULAR ICD (IMPLANTABLE CARDIOVERTER-DEFIBRILLATOR) IN PLACE: Chronic | ICD-10-CM

## 2018-10-01 DIAGNOSIS — Z01.810 PREOP CARDIOVASCULAR EXAM: ICD-10-CM

## 2018-10-01 LAB
AORTIC VALVE REGURGITATION: ABNORMAL
DIASTOLIC DYSFUNCTION: YES
ESTIMATED PA SYSTOLIC PRESSURE: 35.49
RETIRED EF AND QEF - SEE NOTES: 30 (ref 55–65)
TRICUSPID VALVE REGURGITATION: ABNORMAL

## 2018-10-01 PROCEDURE — 93306 TTE W/DOPPLER COMPLETE: CPT | Mod: PBBFAC | Performed by: NUCLEAR MEDICINE

## 2018-10-02 ENCOUNTER — TELEPHONE (OUTPATIENT)
Dept: CARDIOLOGY | Facility: CLINIC | Age: 63
End: 2018-10-02

## 2018-10-02 NOTE — TELEPHONE ENCOUNTER
Called to patient and gave results of echo and informed of surgery clearance--patient verbalizes understanding

## 2018-10-02 NOTE — TELEPHONE ENCOUNTER
Please call pt.   Echo results are overall stable.  May proceed with abdominal surgery at moderate to high risk as advised at recent cardiology appt.    F/u next appt    Dr Calvillo

## 2018-10-04 ENCOUNTER — LAB VISIT (OUTPATIENT)
Dept: LAB | Facility: HOSPITAL | Age: 63
End: 2018-10-04
Attending: SURGERY
Payer: MEDICARE

## 2018-10-04 ENCOUNTER — OFFICE VISIT (OUTPATIENT)
Dept: SURGERY | Facility: CLINIC | Age: 63
End: 2018-10-04
Payer: MEDICARE

## 2018-10-04 DIAGNOSIS — Z93.3 COLOSTOMY IN PLACE: ICD-10-CM

## 2018-10-04 DIAGNOSIS — Z93.3 COLOSTOMY IN PLACE: Primary | ICD-10-CM

## 2018-10-04 DIAGNOSIS — K43.5 PARASTOMAL HERNIA WITHOUT OBSTRUCTION OR GANGRENE: ICD-10-CM

## 2018-10-04 LAB
ACANTHOCYTES BLD QL SMEAR: PRESENT
ALBUMIN SERPL BCP-MCNC: 3.3 G/DL
ALP SERPL-CCNC: 78 U/L
ALT SERPL W/O P-5'-P-CCNC: 15 U/L
ANION GAP SERPL CALC-SCNC: 7 MMOL/L
ANISOCYTOSIS BLD QL SMEAR: SLIGHT
AST SERPL-CCNC: 29 U/L
BASOPHILS # BLD AUTO: 0.02 K/UL
BASOPHILS NFR BLD: 0.3 %
BILIRUB SERPL-MCNC: 0.4 MG/DL
BUN SERPL-MCNC: 33 MG/DL
CALCIUM SERPL-MCNC: 9.8 MG/DL
CHLORIDE SERPL-SCNC: 103 MMOL/L
CO2 SERPL-SCNC: 27 MMOL/L
CREAT SERPL-MCNC: 2.1 MG/DL
DACRYOCYTES BLD QL SMEAR: ABNORMAL
DIFFERENTIAL METHOD: ABNORMAL
EOSINOPHIL # BLD AUTO: 0.2 K/UL
EOSINOPHIL NFR BLD: 2.4 %
ERYTHROCYTE [DISTWIDTH] IN BLOOD BY AUTOMATED COUNT: 16.2 %
EST. GFR  (AFRICAN AMERICAN): 37 ML/MIN/1.73 M^2
EST. GFR  (NON AFRICAN AMERICAN): 32 ML/MIN/1.73 M^2
GLUCOSE SERPL-MCNC: 207 MG/DL
HCT VFR BLD AUTO: 39.3 %
HGB BLD-MCNC: 13 G/DL
HYPOCHROMIA BLD QL SMEAR: ABNORMAL
LYMPHOCYTES # BLD AUTO: 2.2 K/UL
LYMPHOCYTES NFR BLD: 35 %
MCH RBC QN AUTO: 28.3 PG
MCHC RBC AUTO-ENTMCNC: 33.1 G/DL
MCV RBC AUTO: 86 FL
MONOCYTES # BLD AUTO: 0.5 K/UL
MONOCYTES NFR BLD: 7.5 %
NEUTROPHILS # BLD AUTO: 3.4 K/UL
NEUTROPHILS NFR BLD: 55 %
OVALOCYTES BLD QL SMEAR: ABNORMAL
PLATELET # BLD AUTO: 130 K/UL
PLATELET BLD QL SMEAR: ABNORMAL
PMV BLD AUTO: 11.9 FL
POIKILOCYTOSIS BLD QL SMEAR: SLIGHT
POLYCHROMASIA BLD QL SMEAR: ABNORMAL
POTASSIUM SERPL-SCNC: 4.5 MMOL/L
PROT SERPL-MCNC: 7.9 G/DL
RBC # BLD AUTO: 4.59 M/UL
SODIUM SERPL-SCNC: 137 MMOL/L
SPHEROCYTES BLD QL SMEAR: ABNORMAL
STOMATOCYTES BLD QL SMEAR: PRESENT
TARGETS BLD QL SMEAR: ABNORMAL
WBC # BLD AUTO: 6.28 K/UL

## 2018-10-04 PROCEDURE — 80053 COMPREHEN METABOLIC PANEL: CPT

## 2018-10-04 PROCEDURE — 36415 COLL VENOUS BLD VENIPUNCTURE: CPT

## 2018-10-04 PROCEDURE — 99213 OFFICE O/P EST LOW 20 MIN: CPT | Mod: PBBFAC | Performed by: SURGERY

## 2018-10-04 PROCEDURE — 99999 PR PBB SHADOW E&M-EST. PATIENT-LVL III: CPT | Mod: PBBFAC,,, | Performed by: SURGERY

## 2018-10-04 PROCEDURE — 85025 COMPLETE CBC W/AUTO DIFF WBC: CPT

## 2018-10-04 PROCEDURE — 99214 OFFICE O/P EST MOD 30 MIN: CPT | Mod: S$PBB,,, | Performed by: SURGERY

## 2018-10-04 RX ORDER — ONDANSETRON 4 MG/1
8 TABLET, ORALLY DISINTEGRATING ORAL EVERY 8 HOURS PRN
Status: CANCELLED | OUTPATIENT
Start: 2018-10-04

## 2018-10-04 RX ORDER — METRONIDAZOLE 500 MG/1
500 TABLET ORAL ONCE
Qty: 1 TABLET | Refills: 0 | Status: ON HOLD | OUTPATIENT
Start: 2018-10-29 | End: 2018-11-08 | Stop reason: HOSPADM

## 2018-10-04 RX ORDER — LIDOCAINE HYDROCHLORIDE 10 MG/ML
1 INJECTION, SOLUTION EPIDURAL; INFILTRATION; INTRACAUDAL; PERINEURAL ONCE
Status: DISCONTINUED | OUTPATIENT
Start: 2018-10-04 | End: 2018-10-11 | Stop reason: CLARIF

## 2018-10-04 RX ORDER — NEOMYCIN SULFATE 500 MG/1
500 TABLET ORAL ONCE
Qty: 1 TABLET | Refills: 0 | Status: SHIPPED | OUTPATIENT
Start: 2018-10-29 | End: 2018-10-29

## 2018-10-04 RX ORDER — METRONIDAZOLE 500 MG/100ML
500 INJECTION, SOLUTION INTRAVENOUS
Status: CANCELLED | OUTPATIENT
Start: 2018-10-04

## 2018-10-04 RX ORDER — SODIUM, POTASSIUM,MAG SULFATES 17.5-3.13G
1 SOLUTION, RECONSTITUTED, ORAL ORAL ONCE
Qty: 2 BOTTLE | Refills: 0 | Status: ON HOLD | OUTPATIENT
Start: 2018-10-29 | End: 2018-10-11 | Stop reason: CLARIF

## 2018-10-04 NOTE — PATIENT INSTRUCTIONS
"Surgery scheduled for October 30th at 8 in the morning.  The hospital call you with the time for arrival.    No solid food after midnight on October October 27th.    You will start a clear liquid diet on October 28th.    On October 29th you will drink the bowel prep.  One bottle in the morning and 1 bottle in the afternoon.  After all the liquid from the bowel prep comes out of your colostomy bag you can take the 2 antibiotic pills      Please stop your aspirin on October 23rd.    The remainder of your morning medicine she should take with a sip of water      I will asked 1 of my partners to contact you about doing a colonoscopy as you have not had 1 in 8 years.  You will need to do a separate bowel prep for this.  We will ask that it be done about a week or 2 before your colostomy reversed    [facility]  Instructions for Patients and Families    You are invited to share your experience with me and my staff.  If you receive a survey in the mail, please return it at your convenience, or complete a brief survey on [employer].  We value your opinion!        Did you know that [facility] can be used to make appointments?  Just select "Schedule Appointment" under the "Visits" menu.    Notify you if any of your preop laboratories show abnormalities.  I    Before surgery:  The pre-op nurse from the hospital will call you before the day of your surgery to confirm your arrival time.  The time of your surgery may change due to emergencies or other unforeseen events.  Do not eat or drink anything after midnight the night before your procedure, except for clear liquids up to three hours before your surgery time, and sips of water with medication.  If you are not diabetic, it is recommended that you drink a glass of clear fruit juice (apple, grape, cranberry, not orange) three hours before your surgery time, but nothing after that.  If you are diabetic, you may have water or sugar-free clear liquids " such as Crystal Light up to three hours before your surgery time.    Day of Surgery:  · You will go to a pre-op area where an IV will be started and you will speak to the anesthesiologist and surgeon.  · Your family will be updated throughout the operation.  After surgery, your family member may be taken to a private room for consultation with the surgeon.  This is for the privacy of your medical information and does not necessarily mean there is anything wrong.    If your incisions have:  · Glue:  You may take a bath or shower immediately and wash your skin as you normally do.  The glue will eventually crumble or peel off. Do not let your incisions soak under water.  · Strips: Leave them on, but it is OK if they fall off on their own. It is OK to get them wet 48 hours after surgery.  · Bandage: You may remove it 2 days after your surgery, and then you may leave the incision open and take a shower or bath, unless otherwise instructed. If your bandage has clear plastic, you may shower with it on and then remove it 2 days after surgery.    Activities  · Walking is recommended after surgery; bed rest is not recommended unless specifically ordered.  · If you have had abdominal surgery, do not lift over 20 pounds for 4 weeks after surgery.  · If you have had hernia surgery, do not lift over 20 pounds for 6 weeks after surgery.  · You may drive when you are off your post-operative pain medication.  · Do not smoke after surgery, it decreases your ability to heal and increases the risk of infection and pneumonia.    Diet:  Drink lots of fluids after surgery.  You might not have much of an appetite at first, you may eat regular food when you feel ready, unless you are given special diet instructions.    Post-operative symptoms and medications  · It is safe to take over-the-counter medications for constipation, heartburn, sleep, or itching if needed.  Prescription pain medication may contain acetaminophen (Tylenol), so you  "should not take additional acetaminophen (Tylenol) at the same time as your pain medication.  · You may experience nausea, low fever/chills, and clear drainage from your incision, sometimes up to a month after surgery.  Notify our office if you have fever over 101 degrees, worsening redness around your incision, thick cloudy drainage, or inability to drink any liquids.  · You will experience some level of pain after surgery.  Your pain medication should help with the pain, but may not be able to eliminate it entirely.  Pain will decrease with time, and most pain will be gone by 4 to 6 weeks after surgery.  · We are not able to call in prescriptions for pain medication after hours or on weekends.  If your pain medication is ineffective or you will run out soon and need a refill, please call our office at [phone].  We are not able to replace pain medication that has been lost or stolen.    After surgery, you will either be discharged home or admitted to the hospital.  If you are admitted to the hospital, one of the surgeons or a physician assistant will see you once a day.  Due to scheduled surgery, we may see you in the afternoon or at night; however, your nurse is able to page us at any time.  If you feel there is a situation that is not being addressed properly, please dial [phone] from the phone in your room.    Follow-up appointment  · You will see your surgeon or a physician assistant in clinic for a follow-up appointment at either our [facility] (off [facility]) or [facility] (off [facility]) locations, usually between one and four weeks after your surgery.  · The hospital nurses can make your follow up appointment, or you can make it online at [email or web address] or call [phone].  · If you have a smartphone with the ENJORE neena, please let us know if you would like to do a phone visit instead of a post-op office visit.    If you are signed up for [facility], install the "ENJORE" neena to access your test " results, send messages to your doctors, and schedule appointments from your smartphone!

## 2018-10-04 NOTE — H&P (VIEW-ONLY)
History & Physical    SUBJECTIVE:     History of Present Illness:  Patient is a 63 y.o. male presents with a parastomal hernia and colostomy.    Patient underwent a sigmoid resection for perforated diverticulitis.  He has had a colostomy since his surgery. Patient has developed a parastomal hernia.    The patient recently had some issues with his colostomy.  He was seen in the office.    The patient was evaluated by Cardiology was found to have a moderate to high risk of complications from a cardiac point of view with colostomy reversal    An echocardiogram showed an ejection fraction of 30-35%.  But there was decreased ventricular function.    Patient now presents with his wife to discuss colostomy reversal        No chief complaint on file.      Review of patient's allergies indicates:  No Known Allergies    Current Outpatient Medications   Medication Sig Dispense Refill    allopurinol (ZYLOPRIM) 100 MG tablet TAKE 1 TABLET (100 MG TOTAL) BY MOUTH ONCE DAILY. 90 tablet 0    aspirin (ECOTRIN) 81 MG EC tablet Take 81 mg by mouth once daily.      BD INSULIN SYRINGE ULTRA-FINE 0.5 mL 31 gauge x 5/16 Syrg USE AS DIRECTED TO INJECT INSULIN TWO TIMES DAILY 60 each 11    bimatoprost (LUMIGAN) 0.03 % ophthalmic drops Place 1 drop into the left eye every evening. 2.5 mL 11    blood sugar diagnostic Strp 1 strip by Misc.(Non-Drug; Combo Route) route 4 (four) times daily. Telcare 120 strip 11    bumetanide (BUMEX) 2 MG tablet Take 1 tablet (2 mg total) by mouth 2 (two) times daily. (Patient taking differently: Take 2 mg by mouth once daily. ) 75 tablet 11    carvedilol (COREG) 25 MG tablet TAKE 1/2 TABLET BY MOUTH TWICE A DAY WITH MEALS 30 tablet 9    cholecalciferol, vitamin D3, (VITAMIN D3) 1,000 unit capsule Take 1,000 Units by mouth once daily.      insulin glargine (LANTUS) 100 unit/mL injection 50 units bid prn when BS< 150 30 mL 11    IRON/VITAMIN B COMPLEX (GERITOL ORAL) Take by mouth.      lancets Misc For  "tel care 120 each 11    lisinopril (PRINIVIL,ZESTRIL) 5 MG tablet TAKE 1 TABLET (5 MG TOTAL) BY MOUTH ONCE DAILY. 90 tablet 3    metOLazone (ZAROXOLYN) 2.5 MG tablet Take 1 tablet (2.5 mg total) by mouth every Mon, Wed, Fri. 12 tablet 11    pantoprazole (PROTONIX) 40 MG tablet TAKE 1 TABLET BY MOUTH EVERY DAY FOR ACID REFLUX 90 tablet 1    papaverine 30 mg/mL injection Inject 0.3 ml of trimix solution prn ED max once daily  Prepare 10ml of trimix solution containing:    Papaverine 30mg/ml    Phentolamine 1 mg/ml    Alprostadil 10mcg/ml  Dispense 10ml per refill  Qs syringes 1cc/30g/0.5" and alcohol swabs dispense as needed for Intracavernosal injection 10 mL 5    pen needle, diabetic (BD ULTRA-FINE JOSLYN PEN NEEDLE) 32 gauge x 5/32" Ndle 1 each by Misc.(Non-Drug; Combo Route) route 4 (four) times daily. 100 each 11    potassium chloride SA (K-DUR,KLOR-CON) 10 MEQ tablet TAKE 1 TABLET BY MOUTH EVERY DAY 90 tablet 2    simvastatin (ZOCOR) 10 MG tablet TAKE 1 TABLET (10 MG TOTAL) BY MOUTH EVERY EVENING. 30 tablet 7     No current facility-administered medications for this visit.        Past Medical History:   Diagnosis Date    Arthritis     CAD (coronary artery disease)     Cataract     CHF (congestive heart failure)     Dr Calvillo    Chronic combined systolic and diastolic congestive heart failure 3/24/2015    CKD (chronic kidney disease), stage III     Diabetes mellitus type II      AM    Diabetic retinopathy     anti Veg F injections for macular edema    Diverticulitis of colon     ED (erectile dysfunction)     Glaucoma     HTN (hypertension)     Hyperlipidemia     Ischemic cardiomyopathy     Obesity     Pulmonary HTN      Past Surgical History:   Procedure Laterality Date    CARDIAC CATHETERIZATION  2009    CHOLECYSTECTOMY      COLECTOMY-SIGMOID N/A 4/22/2016    [facility]    COLONOSCOPY      COLOSTOMY N/A 4/22/2016    [facility] by Kevin Lindsay, " [facility]    EYE SURGERY      KNEE SURGERY       Family History   Problem Relation Age of Onset    Cancer Mother     Heart disease Father     Stroke Father     No Known Problems Sister     No Known Problems Brother     No Known Problems Maternal Aunt     No Known Problems Maternal Uncle     No Known Problems Paternal Aunt     No Known Problems Paternal Uncle     No Known Problems Maternal Grandmother     No Known Problems Maternal Grandfather     No Known Problems Paternal Grandmother     No Known Problems Paternal Grandfather     Amblyopia Neg Hx     Blindness Neg Hx     Cataracts Neg Hx     Diabetes Neg Hx     Glaucoma Neg Hx     Hypertension Neg Hx     Macular degeneration Neg Hx     Retinal detachment Neg Hx     Strabismus Neg Hx     Thyroid disease Neg Hx      Social History     Tobacco Use    Smoking status: Never Smoker    Smokeless tobacco: Never Used   Substance Use Topics    Alcohol use: Yes     Alcohol/week: 0.0 oz     Comment: " once in awhile...special occassions"    Drug use: No        Review of Systems:  Review of Systems   Constitutional: Negative.    HENT: Negative.    Eyes: Negative.    Respiratory: Negative.  Shortness of breath: With strenuous activity.    Cardiovascular: Negative.    Gastrointestinal: Negative.         Colostomy with hernia   Endocrine: Negative.    Genitourinary: Negative.    Musculoskeletal: Negative.    Skin: Negative.    Allergic/Immunologic: Negative.    Neurological: Negative.    Hematological: Negative.    Psychiatric/Behavioral: Negative.        OBJECTIVE:     Vital Signs (Most Recent)              Physical Exam:  Physical Exam   Constitutional:   Mildly chronically ill   HENT:   Head: Normocephalic and atraumatic.   Eyes: Pupils are equal, round, and reactive to light.   Neck: Neck supple.   Cardiovascular: Normal rate, regular rhythm and normal heart sounds.   Pulmonary/Chest: Effort normal and breath sounds normal.   Abdominal: Soft. " Bowel sounds are normal.   There is a left lower quadrant colostomy.  There is a small umbilical hernia.  There is herniation of the area around the colostomy   Neurological: He is alert.   Skin: Skin is warm and dry.   Vitals reviewed.      Laboratory  CBC: Reviewed  BMP: Reviewed  CMP: Reviewed       Diagnostic Results:  ECG: Reviewed  Echo: Reviewed  Cardiology notes reviewed    ASSESSMENT/PLAN:     One colostomy in place  Parastomal hernia  Multiple medical problems as outlined above    Patient is at a moderate to high risk for colostomy reversal.    Patient would like to proceed with colostomy reversal.    PLAN     The plan would be to arrange a colonoscopy.      The patient is scheduled for a colostomy reversal, versus revision of his colostomy and parastomal hernia on October 31st.    The risks of surgery include infection, bleeding, injury to the ureter, inability to reverse the colostomy, recurrence of the parastomal hernia, incisional hernia      The patient will likely need a central line, arterial line and ICU care postoperatively    .    The patient is that a 15% risk of a cardiac event, should he have a significant cardiac event he has a 50% mortality.  This was discussed with the patient his wife.    Preop bowel prep was ordered.  Arrangements will be made for colonoscopy.  It has been explained to the patient that he will likely need a bowel prep for his colonoscopy and a bowel prep for his colostomy reversal

## 2018-10-04 NOTE — PROGRESS NOTES
History & Physical    SUBJECTIVE:     History of Present Illness:  Patient is a 63 y.o. male presents with a parastomal hernia and colostomy.    Patient underwent a sigmoid resection for perforated diverticulitis.  He has had a colostomy since his surgery. Patient has developed a parastomal hernia.    The patient recently had some issues with his colostomy.  He was seen in the office.    The patient was evaluated by Cardiology was found to have a moderate to high risk of complications from a cardiac point of view with colostomy reversal    An echocardiogram showed an ejection fraction of 30-35%.  But there was decreased ventricular function.    Patient now presents with his wife to discuss colostomy reversal        No chief complaint on file.      Review of patient's allergies indicates:  No Known Allergies    Current Outpatient Medications   Medication Sig Dispense Refill    allopurinol (ZYLOPRIM) 100 MG tablet TAKE 1 TABLET (100 MG TOTAL) BY MOUTH ONCE DAILY. 90 tablet 0    aspirin (ECOTRIN) 81 MG EC tablet Take 81 mg by mouth once daily.      BD INSULIN SYRINGE ULTRA-FINE 0.5 mL 31 gauge x 5/16 Syrg USE AS DIRECTED TO INJECT INSULIN TWO TIMES DAILY 60 each 11    bimatoprost (LUMIGAN) 0.03 % ophthalmic drops Place 1 drop into the left eye every evening. 2.5 mL 11    blood sugar diagnostic Strp 1 strip by Misc.(Non-Drug; Combo Route) route 4 (four) times daily. Telcare 120 strip 11    bumetanide (BUMEX) 2 MG tablet Take 1 tablet (2 mg total) by mouth 2 (two) times daily. (Patient taking differently: Take 2 mg by mouth once daily. ) 75 tablet 11    carvedilol (COREG) 25 MG tablet TAKE 1/2 TABLET BY MOUTH TWICE A DAY WITH MEALS 30 tablet 9    cholecalciferol, vitamin D3, (VITAMIN D3) 1,000 unit capsule Take 1,000 Units by mouth once daily.      insulin glargine (LANTUS) 100 unit/mL injection 50 units bid prn when BS< 150 30 mL 11    IRON/VITAMIN B COMPLEX (GERITOL ORAL) Take by mouth.      lancets Misc For  "tel care 120 each 11    lisinopril (PRINIVIL,ZESTRIL) 5 MG tablet TAKE 1 TABLET (5 MG TOTAL) BY MOUTH ONCE DAILY. 90 tablet 3    metOLazone (ZAROXOLYN) 2.5 MG tablet Take 1 tablet (2.5 mg total) by mouth every Mon, Wed, Fri. 12 tablet 11    pantoprazole (PROTONIX) 40 MG tablet TAKE 1 TABLET BY MOUTH EVERY DAY FOR ACID REFLUX 90 tablet 1    papaverine 30 mg/mL injection Inject 0.3 ml of trimix solution prn ED max once daily  Prepare 10ml of trimix solution containing:    Papaverine 30mg/ml    Phentolamine 1 mg/ml    Alprostadil 10mcg/ml  Dispense 10ml per refill  Qs syringes 1cc/30g/0.5" and alcohol swabs dispense as needed for Intracavernosal injection 10 mL 5    pen needle, diabetic (BD ULTRA-FINE JOSLYN PEN NEEDLE) 32 gauge x 5/32" Ndle 1 each by Misc.(Non-Drug; Combo Route) route 4 (four) times daily. 100 each 11    potassium chloride SA (K-DUR,KLOR-CON) 10 MEQ tablet TAKE 1 TABLET BY MOUTH EVERY DAY 90 tablet 2    simvastatin (ZOCOR) 10 MG tablet TAKE 1 TABLET (10 MG TOTAL) BY MOUTH EVERY EVENING. 30 tablet 7     No current facility-administered medications for this visit.        Past Medical History:   Diagnosis Date    Arthritis     CAD (coronary artery disease)     Cataract     CHF (congestive heart failure)     Dr Calvillo    Chronic combined systolic and diastolic congestive heart failure 3/24/2015    CKD (chronic kidney disease), stage III     Diabetes mellitus type II      AM    Diabetic retinopathy     anti Veg F injections for macular edema    Diverticulitis of colon     ED (erectile dysfunction)     Glaucoma     HTN (hypertension)     Hyperlipidemia     Ischemic cardiomyopathy     Obesity     Pulmonary HTN      Past Surgical History:   Procedure Laterality Date    CARDIAC CATHETERIZATION  2009    CHOLECYSTECTOMY      COLECTOMY-SIGMOID N/A 4/22/2016    [facility]    COLONOSCOPY      COLOSTOMY N/A 4/22/2016    [facility] Kevin Lindsay[facility] " [facility] at [facility]    EYE SURGERY      KNEE SURGERY       Family History   Problem Relation Age of Onset    Cancer Mother     Heart disease Father     Stroke Father     No Known Problems Sister     No Known Problems Brother     No Known Problems Maternal Aunt     No Known Problems Maternal Uncle     No Known Problems Paternal Aunt     No Known Problems Paternal Uncle     No Known Problems Maternal Grandmother     No Known Problems Maternal Grandfather     No Known Problems Paternal Grandmother     No Known Problems Paternal Grandfather     Amblyopia Neg Hx     Blindness Neg Hx     Cataracts Neg Hx     Diabetes Neg Hx     Glaucoma Neg Hx     Hypertension Neg Hx     Macular degeneration Neg Hx     Retinal detachment Neg Hx     Strabismus Neg Hx     Thyroid disease Neg Hx      Social History     Tobacco Use    Smoking status: Never Smoker    Smokeless tobacco: Never Used   Substance Use Topics    Alcohol use: Yes     Alcohol/week: 0.0 oz     Comment: " once in awhile...special occassions"    Drug use: No        Review of Systems:  Review of Systems   Constitutional: Negative.    HENT: Negative.    Eyes: Negative.    Respiratory: Negative.  Shortness of breath: With strenuous activity.    Cardiovascular: Negative.    Gastrointestinal: Negative.         Colostomy with hernia   Endocrine: Negative.    Genitourinary: Negative.    Musculoskeletal: Negative.    Skin: Negative.    Allergic/Immunologic: Negative.    Neurological: Negative.    Hematological: Negative.    Psychiatric/Behavioral: Negative.        OBJECTIVE:     Vital Signs (Most Recent)              Physical Exam:  Physical Exam   Constitutional:   Mildly chronically ill   HENT:   Head: Normocephalic and atraumatic.   Eyes: Pupils are equal, round, and reactive to light.   Neck: Neck supple.   Cardiovascular: Normal rate, regular rhythm and normal heart sounds.   Pulmonary/Chest: Effort normal and breath sounds normal.   Abdominal: Soft. " Bowel sounds are normal.   There is a left lower quadrant colostomy.  There is a small umbilical hernia.  There is herniation of the area around the colostomy   Neurological: He is alert.   Skin: Skin is warm and dry.   Vitals reviewed.      Laboratory  CBC: Reviewed  BMP: Reviewed  CMP: Reviewed       Diagnostic Results:  ECG: Reviewed  Echo: Reviewed  Cardiology notes reviewed    ASSESSMENT/PLAN:     One colostomy in place  Parastomal hernia  Multiple medical problems as outlined above    Patient is at a moderate to high risk for colostomy reversal.    Patient would like to proceed with colostomy reversal.    PLAN     The plan would be to arrange a colonoscopy.      The patient is scheduled for a colostomy reversal, versus revision of his colostomy and parastomal hernia on October 31st.    The risks of surgery include infection, bleeding, injury to the ureter, inability to reverse the colostomy, recurrence of the parastomal hernia, incisional hernia      The patient will likely need a central line, arterial line and ICU care postoperatively    .    The patient is that a 15% risk of a cardiac event, should he have a significant cardiac event he has a 50% mortality.  This was discussed with the patient his wife.    Preop bowel prep was ordered.  Arrangements will be made for colonoscopy.  It has been explained to the patient that he will likely need a bowel prep for his colonoscopy and a bowel prep for his colostomy reversal

## 2018-10-05 DIAGNOSIS — Z93.3 COLOSTOMY IN PLACE: Primary | ICD-10-CM

## 2018-10-09 ENCOUNTER — DOCUMENTATION ONLY (OUTPATIENT)
Dept: ENDOSCOPY | Facility: HOSPITAL | Age: 63
End: 2018-10-09

## 2018-10-09 ENCOUNTER — TELEPHONE (OUTPATIENT)
Dept: SURGERY | Facility: CLINIC | Age: 63
End: 2018-10-09

## 2018-10-09 NOTE — TELEPHONE ENCOUNTER
Alejandra/Fahad, called and stated, there is an open slot on Thursday, October 11, 2018 @ 12:30 for procedure( Colonoscopy via Stoma and Anus). Informed her Dr. Aragon is not in clinic and a message will be sent to him in regards to this matter. Alejandra voiced an understanding

## 2018-10-10 DIAGNOSIS — Z93.3 COLOSTOMY IN PLACE: Primary | ICD-10-CM

## 2018-10-10 RX ORDER — SODIUM, POTASSIUM,MAG SULFATES 17.5-3.13G
1 SOLUTION, RECONSTITUTED, ORAL ORAL DAILY
Qty: 1 KIT | Refills: 0 | Status: ON HOLD | OUTPATIENT
Start: 2018-10-10 | End: 2018-10-11 | Stop reason: CLARIF

## 2018-10-11 ENCOUNTER — ANESTHESIA (OUTPATIENT)
Dept: ENDOSCOPY | Facility: HOSPITAL | Age: 63
End: 2018-10-11
Payer: MEDICARE

## 2018-10-11 ENCOUNTER — HOSPITAL ENCOUNTER (OUTPATIENT)
Facility: HOSPITAL | Age: 63
Discharge: HOME | End: 2018-10-11
Attending: COLON & RECTAL SURGERY | Admitting: COLON & RECTAL SURGERY
Payer: MEDICARE

## 2018-10-11 ENCOUNTER — ANESTHESIA EVENT (OUTPATIENT)
Dept: ENDOSCOPY | Facility: HOSPITAL | Age: 63
End: 2018-10-11
Payer: MEDICARE

## 2018-10-11 VITALS
SYSTOLIC BLOOD PRESSURE: 136 MMHG | RESPIRATION RATE: 18 BRPM | DIASTOLIC BLOOD PRESSURE: 80 MMHG | HEIGHT: 69 IN | TEMPERATURE: 98 F | WEIGHT: 211 LBS | OXYGEN SATURATION: 96 % | HEART RATE: 89 BPM | BODY MASS INDEX: 31.25 KG/M2

## 2018-10-11 DIAGNOSIS — Z12.11 SCREENING FOR COLON CANCER: ICD-10-CM

## 2018-10-11 LAB — POCT GLUCOSE: 184 MG/DL (ref 70–110)

## 2018-10-11 PROCEDURE — 25000003 PHARM REV CODE 250: Performed by: NURSE ANESTHETIST, CERTIFIED REGISTERED

## 2018-10-11 PROCEDURE — 25000003 PHARM REV CODE 250: Performed by: COLON & RECTAL SURGERY

## 2018-10-11 PROCEDURE — 88305 TISSUE EXAM BY PATHOLOGIST: CPT | Performed by: PATHOLOGY

## 2018-10-11 PROCEDURE — 27201012 HC FORCEPS, HOT/COLD, DISP: Performed by: COLON & RECTAL SURGERY

## 2018-10-11 PROCEDURE — 45380 COLONOSCOPY AND BIOPSY: CPT | Mod: 59,,, | Performed by: COLON & RECTAL SURGERY

## 2018-10-11 PROCEDURE — 45380 COLONOSCOPY AND BIOPSY: CPT | Performed by: COLON & RECTAL SURGERY

## 2018-10-11 PROCEDURE — 82962 GLUCOSE BLOOD TEST: CPT | Performed by: COLON & RECTAL SURGERY

## 2018-10-11 PROCEDURE — 63600175 PHARM REV CODE 636 W HCPCS: Performed by: NURSE ANESTHETIST, CERTIFIED REGISTERED

## 2018-10-11 PROCEDURE — 45385 COLONOSCOPY W/LESION REMOVAL: CPT | Mod: PT,,, | Performed by: COLON & RECTAL SURGERY

## 2018-10-11 PROCEDURE — 88305 TISSUE EXAM BY PATHOLOGIST: CPT | Mod: 26,,, | Performed by: PATHOLOGY

## 2018-10-11 PROCEDURE — 45385 COLONOSCOPY W/LESION REMOVAL: CPT | Performed by: COLON & RECTAL SURGERY

## 2018-10-11 PROCEDURE — 45381 COLONOSCOPY SUBMUCOUS NJX: CPT | Performed by: COLON & RECTAL SURGERY

## 2018-10-11 PROCEDURE — 45381 COLONOSCOPY SUBMUCOUS NJX: CPT | Mod: 51,,, | Performed by: COLON & RECTAL SURGERY

## 2018-10-11 PROCEDURE — 37000008 HC ANESTHESIA 1ST 15 MINUTES: Performed by: COLON & RECTAL SURGERY

## 2018-10-11 PROCEDURE — 27201089 HC SNARE, DISP (ANY): Performed by: COLON & RECTAL SURGERY

## 2018-10-11 PROCEDURE — 37000009 HC ANESTHESIA EA ADD 15 MINS: Performed by: COLON & RECTAL SURGERY

## 2018-10-11 RX ORDER — ETOMIDATE 2 MG/ML
INJECTION INTRAVENOUS
Status: DISCONTINUED | OUTPATIENT
Start: 2018-10-11 | End: 2018-10-11

## 2018-10-11 RX ORDER — PROPOFOL 10 MG/ML
VIAL (ML) INTRAVENOUS
Status: DISCONTINUED | OUTPATIENT
Start: 2018-10-11 | End: 2018-10-11

## 2018-10-11 RX ORDER — SODIUM CHLORIDE, SODIUM LACTATE, POTASSIUM CHLORIDE, CALCIUM CHLORIDE 600; 310; 30; 20 MG/100ML; MG/100ML; MG/100ML; MG/100ML
INJECTION, SOLUTION INTRAVENOUS CONTINUOUS
Status: CANCELLED | OUTPATIENT
Start: 2018-10-11

## 2018-10-11 RX ORDER — SODIUM CHLORIDE, SODIUM LACTATE, POTASSIUM CHLORIDE, CALCIUM CHLORIDE 600; 310; 30; 20 MG/100ML; MG/100ML; MG/100ML; MG/100ML
INJECTION, SOLUTION INTRAVENOUS CONTINUOUS
Status: DISCONTINUED | OUTPATIENT
Start: 2018-10-11 | End: 2018-10-11 | Stop reason: HOSPADM

## 2018-10-11 RX ORDER — LIDOCAINE HYDROCHLORIDE 10 MG/ML
INJECTION INFILTRATION; PERINEURAL
Status: DISCONTINUED | OUTPATIENT
Start: 2018-10-11 | End: 2018-10-11

## 2018-10-11 RX ADMIN — PROPOFOL 10 MG: 10 INJECTION, EMULSION INTRAVENOUS at 03:10

## 2018-10-11 RX ADMIN — ETOMIDATE 2 MG: 2 INJECTION, SOLUTION INTRAVENOUS at 02:10

## 2018-10-11 RX ADMIN — SODIUM CHLORIDE, SODIUM LACTATE, POTASSIUM CHLORIDE, AND CALCIUM CHLORIDE: 600; 310; 30; 20 INJECTION, SOLUTION INTRAVENOUS at 02:10

## 2018-10-11 RX ADMIN — PROPOFOL 20 MG: 10 INJECTION, EMULSION INTRAVENOUS at 02:10

## 2018-10-11 RX ADMIN — PROPOFOL 10 MG: 10 INJECTION, EMULSION INTRAVENOUS at 02:10

## 2018-10-11 RX ADMIN — ETOMIDATE 2 MG: 2 INJECTION, SOLUTION INTRAVENOUS at 03:10

## 2018-10-11 RX ADMIN — LIDOCAINE HYDROCHLORIDE 50 MG: 10 INJECTION, SOLUTION INFILTRATION; PERINEURAL at 02:10

## 2018-10-11 RX ADMIN — SODIUM CHLORIDE, SODIUM LACTATE, POTASSIUM CHLORIDE, AND CALCIUM CHLORIDE: 600; 310; 30; 20 INJECTION, SOLUTION INTRAVENOUS at 01:10

## 2018-10-11 RX ADMIN — ETOMIDATE 8 MG: 2 INJECTION, SOLUTION INTRAVENOUS at 02:10

## 2018-10-11 NOTE — ANESTHESIA RELEASE NOTE
"Anesthesia Release from PACU Note    Patient: Yan Choudhury    Procedure(s) Performed: Procedure(s) (LRB):  COLONOSCOPY (N/A)    Anesthesia type: MAC    Post pain: Adequate analgesia    Post assessment: no apparent anesthetic complications    Last Vitals:   Visit Vitals  /86 (BP Location: Left arm, Patient Position: Lying)   Pulse 82   Temp 36.4 °C (97.5 °F) (Oral)   Resp 18   Ht 5' 8.5" (1.74 m)   Wt 95.7 kg (211 lb)   SpO2 98%   BMI 31.62 kg/m²       Post vital signs: stable    Level of consciousness: awake    Nausea/Vomiting: no nausea/no vomiting    Complications: none    Airway Patency: patent    Respiratory: unassisted    Cardiovascular: stable and blood pressure at baseline    Hydration: euvolemic  "

## 2018-10-11 NOTE — ANESTHESIA PREPROCEDURE EVALUATION
10/11/2018  Yan Choudhury is a 63 y.o., male.    Anesthesia Evaluation    I have reviewed the Patient Summary Reports.    I have reviewed the Nursing Notes.   I have reviewed the Medications.     Review of Systems  Anesthesia Hx:  No problems with previous Anesthesia  History of prior surgery of interest to airway management or planning: Denies Family Hx of Anesthesia complications.   Denies Personal Hx of Anesthesia complications.   Social:  Non-Smoker    Hematology/Oncology:         -- Anemia:   EENT/Dental:   Diabetic retinopathy   Cardiovascular:   Hypertension CAD  Dysrhythmias  CHF ECG has been reviewed. Pulmonary HTN    ICD    Echo 10/18  CONCLUSIONS     1 - Biatrial enlargement.     2 - Mild left ventricular enlargement.     3 - Eccentric hypertrophy.     4 - Wall motion abnormalities.     5 - Moderately depressed left ventricular systolic function (EF 30-35%).     6 - Restrictive LV filling pattern, indicating markedly elevated LAP (grade 3 diastolic dysfunction).     7 - Right ventricular enlargement with low normal systolic function.     8 - The estimated PA systolic pressure is 35 mmHg.     9 - Mild tricuspid regurgitation.     10 - Moderate pulmonic regurgitation.     11 - Mild to moderate aortic regurgitation.        Pulmonary:   Sleep Apnea, CPAP    Renal/:   Chronic Renal Disease, CRI    Hepatic/GI:   GERD    Musculoskeletal:   gout   Endocrine:   Diabetes, type 2    Psych:  Psychiatric Normal           Physical Exam  General:  Well nourished    Airway/Jaw/Neck:  Airway Findings: Mouth Opening: Normal Tongue: Normal  General Airway Assessment: Adult  Mallampati: II  TM Distance: Normal, at least 6 cm          Chest/Lungs:  Chest/Lungs Findings: Normal Respiratory Rate     Heart/Vascular:  Heart Findings: Rate: Normal             Anesthesia Plan  Type of Anesthesia, risks & benefits  discussed:  Anesthesia Type:  MAC  Patient's Preference:   Intra-op Monitoring Plan: standard ASA monitors  Intra-op Monitoring Plan Comments:   Post Op Pain Control Plan:   Post Op Pain Control Plan Comments:   Induction:   IV  Beta Blocker:  Patient is on a Beta-Blocker and has received one dose within the past 24 hours (No further documentation required).       Informed Consent: Patient understands risks and agrees with Anesthesia plan.  Questions answered. Anesthesia consent signed with patient.  ASA Score: 3     Day of Surgery Review of History & Physical:    H&P update referred to the surgeon.         Ready For Surgery From Anesthesia Perspective.

## 2018-10-11 NOTE — BRIEF OP NOTE
[facility] -   Brief Operative Note     SUMMARY     Surgery Date: 10/11/2018     Surgeon(s) and Role:     * Quinn Aragon MD - Primary    Assisting Surgeon: None    Pre-op Diagnosis:  Colostomy in place [Z93.3]    Post-op Diagnosis:  Post-Op Diagnosis Codes:     * Colostomy in place [Z93.3]    Procedure(s) (LRB):  COLONOSCOPY (N/A)    Anesthesia: Choice    Description of the findings of the procedure: See Op Note    Findings/Key Components: See Op Note    Estimated Blood Loss: 10 mL         Specimens:   Specimen (12h ago, onward)    Start     Ordered    10/11/18 1516  Specimen to Pathology - Surgery  Once     Comments:  1. Transverse colon polyps2. Transverse colon mass     Start Status   10/11/18 1516 Collected (10/11/18 1553)       10/11/18 1553          Discharge Note    SUMMARY     Admit Date: 10/11/2018    Discharge Date and Time: 10/11/2018 4:03 PM    Hospital Course Patient was seen in the preoperative area by both myself and anesthesia. All consents were verified and all questions appropriately answered. All risks, benefits and alternatives explained to patient. Patient proceeded to endoscopy suite for colonoscopy and was discharged home postoperative once cleared by anesthesia.    Final Diagnosis: Post-Op Diagnosis Codes:     * Colostomy in place [Z93.3]    Disposition: Home or Self Care    Follow Up/Patient Instructions: See Provation report    Medications:  Reconciled Home Medications:      Medication List      CHANGE how you take these medications    bumetanide 2 MG tablet  Commonly known as:  BUMEX  Take 1 tablet (2 mg total) by mouth 2 (two) times daily.  What changed:  when to take this        CONTINUE taking these medications    allopurinol 100 MG tablet  Commonly known as:  ZYLOPRIM  TAKE 1 TABLET (100 MG TOTAL) BY MOUTH ONCE DAILY.     aspirin 81 MG EC tablet  Commonly known as:  ECOTRIN  Take 81 mg by mouth once daily.     BD INSULIN SYRINGE ULTRA-FINE 0.5 mL 31 gauge x 5/16  "Syrg  Generic drug:  insulin syringe-needle U-100  USE AS DIRECTED TO INJECT INSULIN TWO TIMES DAILY     bimatoprost 0.03 % ophthalmic drops  Commonly known as:  LUMIGAN  Place 1 drop into the left eye every evening.     blood sugar diagnostic Strp  1 strip by Misc.(Non-Drug; Combo Route) route 4 (four) times daily. Telcare     carvedilol 25 MG tablet  Commonly known as:  COREG  TAKE 1/2 TABLET BY MOUTH TWICE A DAY WITH MEALS     GERITOL ORAL  Take by mouth.     insulin glargine 100 unit/mL injection  Commonly known as:  LANTUS  50 units bid prn when BS< 150     lancets Misc  For tel care     lisinopril 5 MG tablet  Commonly known as:  PRINIVIL,ZESTRIL  TAKE 1 TABLET (5 MG TOTAL) BY MOUTH ONCE DAILY.     metOLazone 2.5 MG tablet  Commonly known as:  ZAROXOLYN  Take 1 tablet (2.5 mg total) by mouth every Mon, Wed, Fri.     metroNIDAZOLE 500 MG tablet  Commonly known as:  FLAGYL  Take 1 tablet (500 mg total) by mouth once. The night before surgery for 1 dose  Start taking on:  10/29/2018     neomycin 500 mg Tab  Commonly known as:  MYCIFRADIN  Take 1 tablet (500 mg total) by mouth once. for 1 dose  Start taking on:  10/29/2018     pantoprazole 40 MG tablet  Commonly known as:  PROTONIX  TAKE 1 TABLET BY MOUTH EVERY DAY FOR ACID REFLUX     pen needle, diabetic 32 gauge x 5/32" Ndle  Commonly known as:  BD ULTRA-FINE JOSLYN PEN NEEDLE  1 each by Misc.(Non-Drug; Combo Route) route 4 (four) times daily.     potassium chloride SA 10 MEQ tablet  Commonly known as:  K-DUR,KLOR-CON  TAKE 1 TABLET BY MOUTH EVERY DAY     simvastatin 10 MG tablet  Commonly known as:  ZOCOR  TAKE 1 TABLET (10 MG TOTAL) BY MOUTH EVERY EVENING.     VITAMIN D3 1,000 unit capsule  Generic drug:  cholecalciferol (vitamin D3)  Take 1,000 Units by mouth once daily.        STOP taking these medications    papaverine 30 mg/mL injection          Discharge Procedure Orders   Diet general     Follow-up Information     Kevin Lindsay MD.    Specialty:  " General Surgery  Why:  As previously scheduled  Contact information:  [address]  [place] [id number] [phone]

## 2018-10-11 NOTE — DISCHARGE INSTRUCTIONS
Understanding Colon and Rectal Polyps    The colon (also called the large intestine) is a muscular tube that forms the last part of the digestive tract. It absorbs water and stores food waste. The colon is about 4 to 6 feet long. The rectum is the last 6 inches of the colon. The colon and rectum have a smooth lining composed of millions of cells. Changes in these cells can lead to growths in the colon that can become cancerous and should be removed. Multiple tests are available to screen for colon cancer, but the colonoscopy is the most recommended test. During colonoscopy, these polyps can be removed. How often you need this test depends on many things including your condition, your family history, symptoms, and what the findings were at the previous colonoscopy.   When the colon lining changes  Changes that happen in the cells that line the colon or rectum can lead to growths called polyps. Over a period of years, polyps can turn cancerous. Removing polyps early may prevent cancer from ever forming.  Polyps  Polyps are fleshy clumps of tissue that form on the lining of the colon or rectum. Small polyps are usually benign (not cancerous). However, over time, cells in a polyp can change and become cancerous. Certain types of polyps known as adenomatous polyps are premalignant. The risk for invasive cancer increases with the size of the polyp and certain cell and gene features. This means that they can become cancerous if they're not removed. Hyperplastic polyps are benign. They can grow quite large and not turn cancerous.   Cancer  Almost all colorectal cancers start when polyp cells begin growing abnormally. As a cancerous tumor grows, it may involve more and more of the colon or rectum. In time, cancer can also grow beyond the colon or rectum and spread to nearby organs or to glands called lymph nodes. The cells can also travel to other parts of the body. This is known as metastasis. The earlier a cancerous  tumor is removed, the better the chance of preventing its spread.    Date Last Reviewed: 8/1/2016  © 7614-6358 [employer]. [address] [place] [address], [place] [address]. All rights reserved. This information is not intended as a substitute for professional medical care. Always follow your healthcare professional's instructions.        Diverticulosis    Diverticulosis means that small pouches have formed in the wall of your large intestine (colon). Most often, this problem causes no symptoms and is common as people age. But the pouches in the colon are at risk of becoming infected. When this happens, the condition is called diverticulitis. Although most people with diverticulosis never develop diverticulitis, it is still not uncommon. Rectal bleeding can also occur and in less common situations, a type of colon inflammation called colitis.  While most people do not have symptoms, some people with diverticulosis may have:  · Abdominal cramps and pain  · Bloating  · Constipation  · Change in bowel habits  Causes  The exact cause of diverticulosis (and diverticulitis) has not been proved, but a few things are associated with the condition:  · Low-fiber diet  · Constipation  · Lack of exercise  Your healthcare provider will talk with you about how to manage your condition. Diet changes may be all that are needed to help control diverticulosis and prevent progression to diverticulitis. If you develop diverticulitis, you will likely need other treatments.  Home care  You may be told to take fiber supplements daily. Fiber adds bulk to the stool so that it passes through the colon more easily. Stool softeners may be recommended. You may also be given medications for pain relief. Be sure to take all medications as directed.  In the past, people were told to avoid corn, nuts, and seeds. This is no longer necessary.  Follow these guidelines when caring for yourself at home:  · Eat unprocessed foods that are high in  fiber. Whole grains, fruits, and vegetables are good choices.  · Drink 6 to 8 glasses of water every day unless your healthcare provider has you limit how much fluid you should have.  · Watch for changes in your bowel movements. Tell your provider if you notice any changes.  · Begin an exercise program. Ask your provider how to get started. Generally, walking is the best.  · Get plenty of rest and sleep.  Follow-up care  Follow up with your healthcare provider, or as advised. Regular visits may be needed to check on your health. Sometimes special procedures such as colonoscopy, are needed after an episode of diverticulitis or blooding. Be sure to keep all your appointments.  If a stool sample was taken, or cultures were done, you should be told if they are positive, or if your treatment needs to be changed. You can call as directed for the results.  If X-rays were done, a radiologist will look at them. You will be told if there is a change in your treatment.  If antibiotics were prescribed, be sure to finish them all.  When to seek medical advice  Call your healthcare provider right away if any of these occur:  · Fever of 100.4°F (38°C) or higher, or as directed by your healthcare provider  · Severe cramps in the lower left side of the abdomen or pain that is getting worse  · Tenderness in the lower left side of the abdomen or worsening pain throughout the abdomen  · Diarrhea or constipation that doesn't get better within 24 hours  · Nausea and vomiting  · Bleeding from the rectum  Call 911  Call emergency services if any of the following occur:  · Trouble breathing  · Confusion  · Very drowsy or trouble awakening  · Fainting or loss of consciousness  · Rapid heart rate  · Chest pain  Date Last Reviewed: 12/30/2015  © 4087-8377 [employer]. [address] [place] [address], [place] [address]. All rights reserved. This information is not intended as a substitute for professional medical care. Always follow your  healthcare professional's instructions.

## 2018-10-11 NOTE — TRANSFER OF CARE
"Anesthesia Transfer of Care Note    Patient: Yan Choudhury    Procedure(s) Performed: Procedure(s) (LRB):  COLONOSCOPY (N/A)    Anesthesia Type: MAC    Transport from OR: Transported from OR on room air with adequate spontaneous ventilation    Post pain: adequate analgesia    Post assessment: no apparent anesthetic complications    Post vital signs: stable    Level of consciousness: awake    Nausea/Vomiting: no nausea/vomiting    Complications: none    Transfer of care protocol was followed      Last vitals:   Visit Vitals  /86 (BP Location: Left arm, Patient Position: Lying)   Pulse 82   Temp 36.4 °C (97.5 °F) (Oral)   Resp 18   Ht 5' 8.5" (1.74 m)   Wt 95.7 kg (211 lb)   SpO2 98%   BMI 31.62 kg/m²     "

## 2018-10-11 NOTE — ANESTHESIA POSTPROCEDURE EVALUATION
"Anesthesia Post Evaluation    Patient: Yan Choudhury    Procedure(s) Performed: Procedure(s) (LRB):  COLONOSCOPY (N/A)    Final Anesthesia Type: MAC  Patient location during evaluation: PACU  Patient participation: Yes- Able to Participate  Level of consciousness: awake and alert  Post-procedure vital signs: reviewed and stable  Pain management: adequate  Airway patency: patent  PONV status at discharge: No PONV  Anesthetic complications: no      Cardiovascular status: blood pressure returned to baseline  Respiratory status: unassisted  Hydration status: euvolemic  Follow-up not needed.        Visit Vitals  /86 (BP Location: Left arm, Patient Position: Lying)   Pulse 82   Temp 36.4 °C (97.5 °F) (Oral)   Resp 18   Ht 5' 8.5" (1.74 m)   Wt 95.7 kg (211 lb)   SpO2 98%   BMI 31.62 kg/m²       Pain/Tahir Score: Pain Assessment Performed: Yes (10/11/2018  3:55 PM)  Presence of Pain: denies (10/11/2018  3:55 PM)  Tahir Score: 9 (10/11/2018  3:55 PM)        "

## 2018-10-11 NOTE — [employer] PATIENT INSTRUCTIONS
Discharge Summary/Instructions after an Endoscopic Procedure  Patient Name: Yan Choudhury  Patient MRN: [id number]  Patient YOB: 1955 Thursday, October 11, 2018 Quinn Aragon MD  RESTRICTIONS:  During your procedure today, you received medications for sedation.  These   medications may affect your judgment, balance and coordination.  Therefore,   for 24 hours, you have the following restrictions:   - DO NOT drive a car, operate machinery, make legal/financial decisions,   sign important papers or drink alcohol.    ACTIVITY:  Today: no heavy lifting, straining or running due to procedural   sedation/anesthesia.  The following day: return to full activity including work.  DIET:  Eat and drink normally unless instructed otherwise.     TREATMENT FOR COMMON SIDE EFFECTS:  - Mild abdominal pain, nausea, belching, bloating or excessive gas:  rest,   eat lightly and use a heating pad.  - Sore Throat: treat with throat lozenges and/or gargle with warm salt   water.  - Because air was used during the procedure, expelling large amounts of air   from your rectum or belching is normal.  - If a bowel prep was taken, you may not have a bowel movement for 1-3 days.    This is normal.  SYMPTOMS TO WATCH FOR AND REPORT TO YOUR PHYSICIAN:  1. Abdominal pain or bloating, other than gas cramps.  2. Chest pain.  3. Back pain.  4. Signs of infection such as: chills or fever occurring within 24 hours   after the procedure.  5. Rectal bleeding, which would show as bright red, maroon, or black stools.   (A tablespoon of blood from the rectum is not serious, especially if   hemorrhoids are present.)  6. Vomiting.  7. Weakness or dizziness.  GO DIRECTLY TO THE NEAREST EMERGENCY ROOM IF YOU HAVE ANY OF THE FOLLOWING:      Difficulty breathing              Chills and/or fever over 101 F   Persistent vomiting and/or vomiting blood   Severe abdominal pain   Severe chest pain   Black, tarry stools   Bleeding- more than one  tablespoon   Any other symptom or condition that you feel may need urgent attention  Your doctor recommends these additional instructions:  If any biopsies were taken, your doctors clinic will contact you in 1 to 2   weeks with any results.  - Discharge patient to home.   - Resume previous diet.   - Continue present medications.   - Await pathology results.   - Repeat colonoscopy at appointment to be scheduled for surveillance based   on pathology results and for surveillance of multiple polyps.   - Return to referring physician as previously scheduled.  For questions, problems or results please call your physician Quinn Aragon MD at Work:  [phone]  If you have any questions about the above instructions, call the GI   department at [phone] or call the endoscopy unit at [phone]   from 7am until 3 pm.  [facility], EMERGENCY ROOM PHONE NUMBER:   [phone]  IF A COMPLICATION OR EMERGENCY SITUATION ARISES AND YOU ARE UNABLE TO REACH   YOUR PHYSICIAN - GO DIRECTLY TO THE EMERGENCY ROOM.  I have read or have had read to me these discharge instructions for my   procedure and have received a written copy.  I understand these   instructions and will follow-up with my physician if I have any questions.     __________________________________       _____________________________________  Nurse Signature                                          Patient/Designated   Responsible Party Signature  MD Quinn Leslie MD  10/11/2018 4:02:20 PM  This report has been verified and signed electronically.  [employer]

## 2018-10-11 NOTE — H&P
[facility]  Colon and Rectal Surgery  History & Physical    Patient Name: Yan Choudhury  MRN: [id number]  Admission Date: 10/11/2018  Attending Physician: Quinn Aragon MD  Primary Care Provider: Sandra Estevez MD    Patient information was obtained from patient and medical records.    Subjective:     Chief Complaint/Reason for Admission: Here for Colonoscopy    History of Present Illness:  Patient is a 63 y.o. male presents for colonoscopy.  Patient previously underwent a Aguila's procedure for perforated diverticulitis.  He presents today for colonoscopy of the a his stoma as well as the anus to rule out other pathology in the colon and rectum.  Denies current bleeding from his anus or stoma, melena, nausea, vomiting or drainage from his anus.  No family history of colorectal cancer, IBD or colonic polyps.  He is scheduled to undergo colostomy reversal later this month.    No current facility-administered medications on file prior to encounter.      Current Outpatient Medications on File Prior to Encounter   Medication Sig    allopurinol (ZYLOPRIM) 100 MG tablet TAKE 1 TABLET (100 MG TOTAL) BY MOUTH ONCE DAILY.    BD INSULIN SYRINGE ULTRA-FINE 0.5 mL 31 gauge x 5/16 Syrg USE AS DIRECTED TO INJECT INSULIN TWO TIMES DAILY    bimatoprost (LUMIGAN) 0.03 % ophthalmic drops Place 1 drop into the left eye every evening.    blood sugar diagnostic Strp 1 strip by Misc.(Non-Drug; Combo Route) route 4 (four) times daily. [employer]    bumetanide (BUMEX) 2 MG tablet Take 1 tablet (2 mg total) by mouth 2 (two) times daily. (Patient taking differently: Take 2 mg by mouth once daily. )    carvedilol (COREG) 25 MG tablet TAKE 1/2 TABLET BY MOUTH TWICE A DAY WITH MEALS    cholecalciferol, vitamin D3, (VITAMIN D3) 1,000 unit capsule Take 1,000 Units by mouth once daily.    insulin glargine (LANTUS) 100 unit/mL injection 50 units bid prn when BS< 150    IRON/VITAMIN B COMPLEX (GERITOL ORAL) Take by mouth.     "lancets Misc For tel care    lisinopril (PRINIVIL,ZESTRIL) 5 MG tablet TAKE 1 TABLET (5 MG TOTAL) BY MOUTH ONCE DAILY.    metOLazone (ZAROXOLYN) 2.5 MG tablet Take 1 tablet (2.5 mg total) by mouth every Mon, Wed, Fri.    pantoprazole (PROTONIX) 40 MG tablet TAKE 1 TABLET BY MOUTH EVERY DAY FOR ACID REFLUX    pen needle, diabetic (BD ULTRA-FINE JOSLYN PEN NEEDLE) 32 gauge x 5/32" Ndle 1 each by Misc.(Non-Drug; Combo Route) route 4 (four) times daily.    potassium chloride SA (K-DUR,KLOR-CON) 10 MEQ tablet TAKE 1 TABLET BY MOUTH EVERY DAY    simvastatin (ZOCOR) 10 MG tablet TAKE 1 TABLET (10 MG TOTAL) BY MOUTH EVERY EVENING.    [DISCONTINUED] sodium,potassium,mag sulfates (SUPREP BOWEL PREP KIT) 17.5-3.13-1.6 gram SolR Take 177 mLs by mouth once. 1 bottle the night before surgery and one bottle early in the morning on the day of surgery for 1 dose    [DISCONTINUED] sodium,potassium,mag sulfates (SUPREP BOWEL PREP KIT) 17.5-3.13-1.6 gram SolR Take 177 mLs by mouth once daily. for 2 days    aspirin (ECOTRIN) 81 MG EC tablet Take 81 mg by mouth once daily.    [START ON 10/29/2018] metroNIDAZOLE (FLAGYL) 500 MG tablet Take 1 tablet (500 mg total) by mouth once. The night before surgery for 1 dose    [START ON 10/29/2018] neomycin (MYCIFRADIN) 500 mg Tab Take 1 tablet (500 mg total) by mouth once. for 1 dose    papaverine 30 mg/mL injection Inject 0.3 ml of trimix solution prn ED max once daily  Prepare 10ml of trimix solution containing:    Papaverine 30mg/ml    Phentolamine 1 mg/ml    Alprostadil 10mcg/ml  Dispense 10ml per refill  Qs syringes 1cc/30g/0.5" and alcohol swabs dispense as needed for Intracavernosal injection       Review of patient's allergies indicates:  No Known Allergies    Past Medical History:   Diagnosis Date    Arthritis     CAD (coronary artery disease)     Cataract     CHF (congestive heart failure)     Dr Calvillo    Chronic combined systolic and diastolic congestive heart failure " "3/24/2015    CKD (chronic kidney disease), stage III     Diabetes mellitus type II      AM    Diabetic retinopathy     anti Veg F injections for macular edema    Diverticulitis of colon     ED (erectile dysfunction)     Glaucoma     HTN (hypertension)     Hyperlipidemia     Ischemic cardiomyopathy     Obesity     Pulmonary HTN      Past Surgical History:   Procedure Laterality Date    CARDIAC CATHETERIZATION  2009    CHOLECYSTECTOMY      COLECTOMY-SIGMOID N/A 4/22/2016    [facility]    COLONOSCOPY      COLOSTOMY N/A 4/22/2016    [facility] OR    EYE SURGERY      KNEE SURGERY       Family History     Problem Relation (Age of Onset)    Cancer Mother    Heart disease Father    No Known Problems Sister, Brother, Maternal Aunt, Maternal Uncle, Paternal Aunt, Paternal Uncle, Maternal Grandmother, Maternal Grandfather, Paternal Grandmother, Paternal Grandfather    Stroke Father        Tobacco Use    Smoking status: Never Smoker    Smokeless tobacco: Never Used   Substance and Sexual Activity    Alcohol use: Yes     Alcohol/week: 0.0 oz     Comment: " once in awhile...special occassions"    Drug use: No    Sexual activity: Not Currently     Review of Systems   Constitutional: Negative for activity change, appetite change, chills, fatigue, fever and unexpected weight change.   HENT: Negative for congestion, ear pain, sore throat and trouble swallowing.    Eyes: Negative for pain, redness and itching.   Respiratory: Negative for cough, shortness of breath and wheezing.    Cardiovascular: Negative for chest pain, palpitations and leg swelling.   Gastrointestinal: Negative for abdominal distention, abdominal pain, anal bleeding and blood in stool.   Endocrine: Negative for cold intolerance, heat intolerance and polyuria.   Genitourinary: Negative for dysuria, flank pain, frequency and hematuria.   Musculoskeletal: Negative for gait " problem, joint swelling and neck pain.   Skin: Negative for color change, rash and wound.   Allergic/Immunologic: Negative for environmental allergies and immunocompromised state.   Neurological: Negative for dizziness, speech difficulty, weakness and numbness.   Psychiatric/Behavioral: Negative for agitation, confusion and hallucinations.     Objective:     Vital Signs (Most Recent):  Temp: 97.5 °F (36.4 °C) (10/11/18 1310)  Pulse: 84 (10/11/18 1310)  Resp: 12 (10/11/18 1310)  BP: 130/85 (10/11/18 1311)  SpO2: 98 % (10/11/18 1310) Vital Signs (24h Range):  Temp:  [97.5 °F (36.4 °C)] 97.5 °F (36.4 °C)  Pulse:  [84] 84  Resp:  [12] 12  SpO2:  [98 %] 98 %  BP: (130)/(85) 130/85     Weight: 95.7 kg (211 lb)  Body mass index is 31.62 kg/m².    Physical Exam   Constitutional: He is oriented to person, place, and time. He appears well-developed.   HENT:   Head: Normocephalic and atraumatic.   Eyes: Conjunctivae and EOM are normal.   Neck: Normal range of motion. No thyromegaly present.   Cardiovascular: Normal rate and regular rhythm.   Pulmonary/Chest: Effort normal. No respiratory distress.   Abdominal: Soft. He exhibits no distension and no mass. There is no tenderness.   Ostomy: pink and viable   Musculoskeletal: Normal range of motion. He exhibits no edema or tenderness.   Neurological: He is alert and oriented to person, place, and time.   Skin: Skin is warm and dry. Capillary refill takes less than 2 seconds. No rash noted.   Psychiatric: He has a normal mood and affect.         Assessment/Plan:     Patient is a 63 y.o. male who presents for colonoscopy     - Ok to proceed to endoscopy suite for colonoscopy  - Consent obtained. All risks, benefits and alternatives fully explained to patient, including but not limited to bleeding, infection, perforation, and missed polyps. All questions appropriately answered to patient's satisfaction. Consent signed and placed on chart.      Quinn Aragon MD  Colon and Rectal  Surgery  [facility]

## 2018-10-21 RX ORDER — ALLOPURINOL 100 MG/1
TABLET ORAL
Qty: 90 TABLET | Refills: 0 | Status: ON HOLD | OUTPATIENT
Start: 2018-10-21 | End: 2019-01-17 | Stop reason: SDUPTHER

## 2018-10-26 ENCOUNTER — TELEPHONE (OUTPATIENT)
Dept: SURGERY | Facility: CLINIC | Age: 63
End: 2018-10-26

## 2018-10-26 NOTE — PRE ADMISSION SCREENING
Informed Donita that this patient was instructed to stop his aspirin on 10/23/2018 but he hasn't and took his aspirin today. Told Donita we would call patient and instruct him to stop taking his aspirin.

## 2018-10-26 NOTE — TELEPHONE ENCOUNTER
----- Message from Kevin Lindsay MD sent at 10/26/2018  3:15 PM CDT -----  Regarding: RE: surgery  Please call and have his stop his asa today and it should ok    Will send prep to his pharmacy    ----- Message -----  From: Donita Lan MA  Sent: 10/26/2018   2:23 PM  To: Kevin Lindsay MD  Subject: surgery                                          Patient needs bowel prep sent to the pharmacy. Also, he has not stop his aspirin and would you still like to do surgery on his. He said was not given any instructions for stopping aspirin, bowel prep, or antibiotics.

## 2018-10-26 NOTE — PRE ADMISSION SCREENING
Pre op instructions reviewed with patient's wife per phone:    To confirm, Your surgeon has instructed you:  Surgery is scheduled 10/30/2018 at 8:15 am.      Pre admit office to call afternoon prior to surgery with final arrival time.  If surgery is on Monday, Pre admit office to call Friday afternoon with with final arrival time      Please report to [facility] 1st floor main lobby by 6:45 am.      INSTRUCTIONS IMPORTANT!!!  ¨ No smoking after 12 midnight, the night before surgery.  No solid food after midnight on October 27th. Instructed patient to start clear liquid diet on October 28th. On October 29th instructed the patient to drink one bottle in the morning and then another bottle in the evening. Told patient after all the bowel prep comes out of his colostomy bag then take the two antibiotics ( Flagyl and neomycin).  Told patient that Dr. Lindsay wanted him to stop his ASA on 10/23/2018. Instructed the patient to stop ASA now. Patient verbalized understanding.      ¨ ¨ Take only these medicines with a small swallow of water-morning of surgery.  Coreg,lisinopril      ____  Do not wear makeup, including mascara.  ____  No powder, lotions or creams to surgical area.  ____  Please remove all jewelry, including piercings and leave at home.  ____  No money or valuables needed. Please leave at home.  ____  Please bring identification and insurance information to hospital.  ____  If going home the same day, arrange for a ride home. You will not be able to   drive if Anesthesia was used.  ____  Children, under 12 years old, must remain in the waiting room with an adult.  They are not allowed in patient areas.  ____  Wear loose fitting clothing. Allow for dressings, bandages.  ____  Stop Aspirin, Ibuprofen, Motrin and Aleve at least 5-7 days before surgery, unless otherwise instructed by your doctor, or the nurse.   You MAY use Tylenol/acetaminophen until day of surgery.  ____  If you take  diabetic medication, do not take am of surgery unless instructed by   Doctor.  ____ Stop taking any Fish Oil supplement or any Vitamins that contain Vitamin E at least 5 days prior to surgery.          Bathing Instructions-- The night before surgery and the morning prior to coming to the hospital:   -Do not shave the surgical area.   -Shower and wash your hair and body as usual with your regular soap and shampoo.   -Rinse your hair and body completely.   -Use one packet of hibiclens to wash the surgical site (using your hand) gently for 5 minutes.  Do not scrub you skin too hard.   -Do not use hibiclens on your head, face, or genitals.   -Do not wash with regular soap after you use the hibiclens.   -Rinse your body thoroughly.   -Dry with clean, soft towel.  Do not use lotion, cream, deodorant, or powders on   the surgical site.    Use antibacterial soap in place of hibiclens if your surgery is on the head, face or genitals.         Surgical Site Infection    Prevention of surgical site infections:     -Keep incisions clean and dry.   -Do not soak/submerge incisions in water until completely healed.   -Do not apply lotions, powders, creams, or deodorants to site.   -Always make sure hands are cleaned with antibacterial soap/ alcohol-based   prior to touching the surgical site.  (This includes doctors, nurses, staff, and yourself.)    Signs and symptoms:   -Redness and pain around the area where you had surgery   -Drainage of cloudy fluid from your surgical wound   -Fever over 100.4  I have read or had read and explained to me, and understand the above information.

## 2018-10-27 RX ORDER — SODIUM, POTASSIUM,MAG SULFATES 17.5-3.13G
1 SOLUTION, RECONSTITUTED, ORAL ORAL ONCE
Qty: 2 BOTTLE | Refills: 0 | OUTPATIENT
Start: 2018-10-27 | End: 2018-10-27

## 2018-10-27 RX ORDER — SODIUM, POTASSIUM,MAG SULFATES 17.5-3.13G
1 SOLUTION, RECONSTITUTED, ORAL ORAL ONCE
Qty: 2 BOTTLE | Refills: 0 | Status: SHIPPED | OUTPATIENT
Start: 2018-10-27 | End: 2018-10-27

## 2018-10-29 ENCOUNTER — ANESTHESIA EVENT (OUTPATIENT)
Dept: SURGERY | Facility: HOSPITAL | Age: 63
DRG: 329 | End: 2018-10-29
Payer: MEDICARE

## 2018-10-29 ENCOUNTER — TELEPHONE (OUTPATIENT)
Dept: ENDOSCOPY | Facility: HOSPITAL | Age: 63
End: 2018-10-29

## 2018-10-30 ENCOUNTER — ANESTHESIA (OUTPATIENT)
Dept: SURGERY | Facility: HOSPITAL | Age: 63
DRG: 329 | End: 2018-10-30
Payer: MEDICARE

## 2018-10-30 ENCOUNTER — HOSPITAL ENCOUNTER (INPATIENT)
Facility: HOSPITAL | Age: 63
LOS: 9 days | Discharge: HOME HEALTH | DRG: 329 | End: 2018-11-08
Attending: SURGERY | Admitting: SURGERY
Payer: MEDICARE

## 2018-10-30 DIAGNOSIS — Z79.4 TYPE 2 DIABETES MELLITUS WITH STAGE 4 CHRONIC KIDNEY DISEASE, WITH LONG-TERM CURRENT USE OF INSULIN: ICD-10-CM

## 2018-10-30 DIAGNOSIS — E11.22 TYPE 2 DIABETES MELLITUS WITH STAGE 4 CHRONIC KIDNEY DISEASE, WITH LONG-TERM CURRENT USE OF INSULIN: ICD-10-CM

## 2018-10-30 DIAGNOSIS — G47.33 OSA ON CPAP: Chronic | ICD-10-CM

## 2018-10-30 DIAGNOSIS — Z93.3 COLOSTOMY IN PLACE: Primary | ICD-10-CM

## 2018-10-30 DIAGNOSIS — N18.4 CKD (CHRONIC KIDNEY DISEASE) STAGE 4, GFR 15-29 ML/MIN: ICD-10-CM

## 2018-10-30 DIAGNOSIS — Z90.49 S/P PARTIAL COLECTOMY: ICD-10-CM

## 2018-10-30 DIAGNOSIS — E78.5 HYPERLIPIDEMIA ASSOCIATED WITH TYPE 2 DIABETES MELLITUS: ICD-10-CM

## 2018-10-30 DIAGNOSIS — I50.42 CHRONIC COMBINED SYSTOLIC AND DIASTOLIC CONGESTIVE HEART FAILURE: ICD-10-CM

## 2018-10-30 DIAGNOSIS — N17.9 AKI (ACUTE KIDNEY INJURY): ICD-10-CM

## 2018-10-30 DIAGNOSIS — N18.4 TYPE 2 DIABETES MELLITUS WITH STAGE 4 CHRONIC KIDNEY DISEASE, WITH LONG-TERM CURRENT USE OF INSULIN: ICD-10-CM

## 2018-10-30 DIAGNOSIS — E66.9 OBESITY (BMI 30.0-34.9): ICD-10-CM

## 2018-10-30 DIAGNOSIS — E11.69 HYPERLIPIDEMIA ASSOCIATED WITH TYPE 2 DIABETES MELLITUS: ICD-10-CM

## 2018-10-30 DIAGNOSIS — I25.5 ISCHEMIC CARDIOMYOPATHY: ICD-10-CM

## 2018-10-30 DIAGNOSIS — I15.2 HYPERTENSION ASSOCIATED WITH DIABETES: ICD-10-CM

## 2018-10-30 DIAGNOSIS — K43.5 PARASTOMAL HERNIA WITHOUT OBSTRUCTION OR GANGRENE: ICD-10-CM

## 2018-10-30 DIAGNOSIS — E11.59 HYPERTENSION ASSOCIATED WITH DIABETES: ICD-10-CM

## 2018-10-30 PROBLEM — Z98.890 HISTORY OF COLOSTOMY REVERSAL: Status: ACTIVE | Noted: 2018-10-30

## 2018-10-30 LAB
ABO GROUP BLD: NORMAL
ANION GAP SERPL CALC-SCNC: 9 MMOL/L
BASOPHILS # BLD AUTO: 0.01 K/UL
BASOPHILS NFR BLD: 0 %
BLD GP AB SCN CELLS X3 SERPL QL: NORMAL
BUN SERPL-MCNC: 38 MG/DL
CALCIUM SERPL-MCNC: 7.6 MG/DL
CHLORIDE SERPL-SCNC: 105 MMOL/L
CO2 SERPL-SCNC: 22 MMOL/L
CREAT SERPL-MCNC: 2.5 MG/DL
DIFFERENTIAL METHOD: ABNORMAL
EOSINOPHIL # BLD AUTO: 0 K/UL
EOSINOPHIL NFR BLD: 0.1 %
ERYTHROCYTE [DISTWIDTH] IN BLOOD BY AUTOMATED COUNT: 16.5 %
EST. GFR  (AFRICAN AMERICAN): 30 ML/MIN/1.73 M^2
EST. GFR  (NON AFRICAN AMERICAN): 26 ML/MIN/1.73 M^2
ESTIMATED AVG GLUCOSE: 140 MG/DL
GLUCOSE SERPL-MCNC: 302 MG/DL
HBA1C MFR BLD HPLC: 6.5 %
HCT VFR BLD AUTO: 30.1 %
HGB BLD-MCNC: 10 G/DL
LYMPHOCYTES # BLD AUTO: 1.9 K/UL
LYMPHOCYTES NFR BLD: 8.7 %
MCH RBC QN AUTO: 28.6 PG
MCHC RBC AUTO-ENTMCNC: 33.2 G/DL
MCV RBC AUTO: 86 FL
MONOCYTES # BLD AUTO: 1 K/UL
MONOCYTES NFR BLD: 4.7 %
NEUTROPHILS # BLD AUTO: 18.9 K/UL
NEUTROPHILS NFR BLD: 86.5 %
PLATELET # BLD AUTO: 196 K/UL
PMV BLD AUTO: 11.9 FL
POCT GLUCOSE: 153 MG/DL (ref 70–110)
POCT GLUCOSE: 222 MG/DL (ref 70–110)
POCT GLUCOSE: 250 MG/DL (ref 70–110)
POCT GLUCOSE: 260 MG/DL (ref 70–110)
POTASSIUM SERPL-SCNC: 4 MMOL/L
RBC # BLD AUTO: 3.5 M/UL
RH BLD: NORMAL
SODIUM SERPL-SCNC: 136 MMOL/L
WBC # BLD AUTO: 21.92 K/UL

## 2018-10-30 PROCEDURE — 44310 ILEOSTOMY/JEJUNOSTOMY: CPT | Mod: ,,, | Performed by: SURGERY

## 2018-10-30 PROCEDURE — 63600175 PHARM REV CODE 636 W HCPCS: Performed by: NURSE ANESTHETIST, CERTIFIED REGISTERED

## 2018-10-30 PROCEDURE — 25000003 PHARM REV CODE 250: Performed by: NURSE ANESTHETIST, CERTIFIED REGISTERED

## 2018-10-30 PROCEDURE — 20000000 HC ICU ROOM

## 2018-10-30 PROCEDURE — 71000039 HC RECOVERY, EACH ADD'L HOUR: Performed by: SURGERY

## 2018-10-30 PROCEDURE — 86901 BLOOD TYPING SEROLOGIC RH(D): CPT

## 2018-10-30 PROCEDURE — 37000008 HC ANESTHESIA 1ST 15 MINUTES: Performed by: SURGERY

## 2018-10-30 PROCEDURE — 94660 CPAP INITIATION&MGMT: CPT

## 2018-10-30 PROCEDURE — 37000009 HC ANESTHESIA EA ADD 15 MINS: Performed by: SURGERY

## 2018-10-30 PROCEDURE — 0DTF0ZZ RESECTION OF RIGHT LARGE INTESTINE, OPEN APPROACH: ICD-10-PCS | Performed by: SURGERY

## 2018-10-30 PROCEDURE — 25000003 PHARM REV CODE 250: Performed by: ANESTHESIOLOGY

## 2018-10-30 PROCEDURE — 63600175 PHARM REV CODE 636 W HCPCS: Performed by: SURGERY

## 2018-10-30 PROCEDURE — 99291 CRITICAL CARE FIRST HOUR: CPT | Mod: ,,, | Performed by: NURSE PRACTITIONER

## 2018-10-30 PROCEDURE — 44160 REMOVAL OF COLON: CPT | Mod: 51,,, | Performed by: SURGERY

## 2018-10-30 PROCEDURE — C1765 ADHESION BARRIER: HCPCS | Performed by: SURGERY

## 2018-10-30 PROCEDURE — 88304 TISSUE EXAM BY PATHOLOGIST: CPT | Mod: 26,,, | Performed by: PATHOLOGY

## 2018-10-30 PROCEDURE — 0D1B0Z4 BYPASS ILEUM TO CUTANEOUS, OPEN APPROACH: ICD-10-PCS | Performed by: SURGERY

## 2018-10-30 PROCEDURE — 0WQF0ZZ REPAIR ABDOMINAL WALL, OPEN APPROACH: ICD-10-PCS | Performed by: SURGERY

## 2018-10-30 PROCEDURE — 94799 UNLISTED PULMONARY SVC/PX: CPT

## 2018-10-30 PROCEDURE — 27201423 OPTIME MED/SURG SUP & DEVICES STERILE SUPPLY: Performed by: SURGERY

## 2018-10-30 PROCEDURE — S0020 INJECTION, BUPIVICAINE HYDRO: HCPCS | Performed by: SURGERY

## 2018-10-30 PROCEDURE — 0DBM0ZZ EXCISION OF DESCENDING COLON, OPEN APPROACH: ICD-10-PCS | Performed by: SURGERY

## 2018-10-30 PROCEDURE — A4216 STERILE WATER/SALINE, 10 ML: HCPCS | Performed by: ANESTHESIOLOGY

## 2018-10-30 PROCEDURE — 88305 TISSUE EXAM BY PATHOLOGIST: CPT | Mod: 26,,, | Performed by: PATHOLOGY

## 2018-10-30 PROCEDURE — 44626 REPAIR BOWEL OPENING: CPT | Mod: 80,,, | Performed by: COLON & RECTAL SURGERY

## 2018-10-30 PROCEDURE — 86850 RBC ANTIBODY SCREEN: CPT

## 2018-10-30 PROCEDURE — 3E0M05Z INTRODUCTION OF ADHESION BARRIER INTO PERITONEAL CAVITY, OPEN APPROACH: ICD-10-PCS | Performed by: SURGERY

## 2018-10-30 PROCEDURE — 25000003 PHARM REV CODE 250: Performed by: SURGERY

## 2018-10-30 PROCEDURE — 86922 COMPATIBILITY TEST ANTIGLOB: CPT

## 2018-10-30 PROCEDURE — 44310 ILEOSTOMY/JEJUNOSTOMY: CPT | Mod: 80,,, | Performed by: COLON & RECTAL SURGERY

## 2018-10-30 PROCEDURE — C9290 INJ, BUPIVACAINE LIPOSOME: HCPCS | Performed by: SURGERY

## 2018-10-30 PROCEDURE — 36000708 HC OR TIME LEV III 1ST 15 MIN: Performed by: SURGERY

## 2018-10-30 PROCEDURE — 83036 HEMOGLOBIN GLYCOSYLATED A1C: CPT

## 2018-10-30 PROCEDURE — 27000221 HC OXYGEN, UP TO 24 HOURS

## 2018-10-30 PROCEDURE — P9045 ALBUMIN (HUMAN), 5%, 250 ML: HCPCS | Mod: JG | Performed by: ANESTHESIOLOGY

## 2018-10-30 PROCEDURE — C1729 CATH, DRAINAGE: HCPCS | Performed by: SURGERY

## 2018-10-30 PROCEDURE — 88305 TISSUE EXAM BY PATHOLOGIST: CPT | Performed by: PATHOLOGY

## 2018-10-30 PROCEDURE — 71000033 HC RECOVERY, INTIAL HOUR: Performed by: SURGERY

## 2018-10-30 PROCEDURE — 88307 TISSUE EXAM BY PATHOLOGIST: CPT | Mod: 26,,, | Performed by: PATHOLOGY

## 2018-10-30 PROCEDURE — 88307 TISSUE EXAM BY PATHOLOGIST: CPT | Performed by: PATHOLOGY

## 2018-10-30 PROCEDURE — 63600175 PHARM REV CODE 636 W HCPCS: Mod: JG | Performed by: ANESTHESIOLOGY

## 2018-10-30 PROCEDURE — 80048 BASIC METABOLIC PNL TOTAL CA: CPT

## 2018-10-30 PROCEDURE — 44626 REPAIR BOWEL OPENING: CPT | Mod: ,,, | Performed by: SURGERY

## 2018-10-30 PROCEDURE — 44160 REMOVAL OF COLON: CPT | Mod: 80,51,, | Performed by: COLON & RECTAL SURGERY

## 2018-10-30 PROCEDURE — 36415 COLL VENOUS BLD VENIPUNCTURE: CPT

## 2018-10-30 PROCEDURE — S0030 INJECTION, METRONIDAZOLE: HCPCS | Performed by: SURGERY

## 2018-10-30 PROCEDURE — 36000709 HC OR TIME LEV III EA ADD 15 MIN: Performed by: SURGERY

## 2018-10-30 PROCEDURE — 85025 COMPLETE CBC W/AUTO DIFF WBC: CPT

## 2018-10-30 PROCEDURE — 27000190 HC CPAP FULL FACE MASK W/VALVE

## 2018-10-30 PROCEDURE — 0DJD8ZZ INSPECTION OF LOWER INTESTINAL TRACT, VIA NATURAL OR ARTIFICIAL OPENING ENDOSCOPIC: ICD-10-PCS | Performed by: SURGERY

## 2018-10-30 PROCEDURE — 86900 BLOOD TYPING SEROLOGIC ABO: CPT

## 2018-10-30 DEVICE — MEMBRANE SEPRAFILM 5 X 6: Type: IMPLANTABLE DEVICE | Site: ABDOMEN | Status: FUNCTIONAL

## 2018-10-30 RX ORDER — MORPHINE SULFATE 4 MG/ML
2 INJECTION, SOLUTION INTRAMUSCULAR; INTRAVENOUS EVERY 5 MIN PRN
Status: DISCONTINUED | OUTPATIENT
Start: 2018-10-30 | End: 2018-11-05

## 2018-10-30 RX ORDER — SODIUM CHLORIDE 9 MG/ML
INJECTION, SOLUTION INTRAVENOUS CONTINUOUS PRN
Status: DISCONTINUED | OUTPATIENT
Start: 2018-10-30 | End: 2018-10-30

## 2018-10-30 RX ORDER — GLUCAGON 1 MG
1 KIT INJECTION
Status: DISCONTINUED | OUTPATIENT
Start: 2018-10-30 | End: 2018-11-08 | Stop reason: HOSPADM

## 2018-10-30 RX ORDER — ALBUMIN HUMAN 50 G/1000ML
12.5 SOLUTION INTRAVENOUS ONCE
Status: COMPLETED | OUTPATIENT
Start: 2018-10-30 | End: 2018-10-30

## 2018-10-30 RX ORDER — MEPERIDINE HYDROCHLORIDE 50 MG/ML
12.5 INJECTION INTRAMUSCULAR; INTRAVENOUS; SUBCUTANEOUS ONCE AS NEEDED
Status: ACTIVE | OUTPATIENT
Start: 2018-10-30 | End: 2018-10-30

## 2018-10-30 RX ORDER — ONDANSETRON 2 MG/ML
INJECTION INTRAMUSCULAR; INTRAVENOUS
Status: DISCONTINUED | OUTPATIENT
Start: 2018-10-30 | End: 2018-10-30

## 2018-10-30 RX ORDER — SODIUM CHLORIDE, SODIUM LACTATE, POTASSIUM CHLORIDE, CALCIUM CHLORIDE 600; 310; 30; 20 MG/100ML; MG/100ML; MG/100ML; MG/100ML
INJECTION, SOLUTION INTRAVENOUS CONTINUOUS
Status: DISCONTINUED | OUTPATIENT
Start: 2018-10-30 | End: 2018-10-30

## 2018-10-30 RX ORDER — LIDOCAINE HCL/PF 100 MG/5ML
SYRINGE (ML) INTRAVENOUS
Status: DISCONTINUED | OUTPATIENT
Start: 2018-10-30 | End: 2018-10-30

## 2018-10-30 RX ORDER — ENOXAPARIN SODIUM 100 MG/ML
30 INJECTION SUBCUTANEOUS EVERY 24 HOURS
Status: DISCONTINUED | OUTPATIENT
Start: 2018-10-31 | End: 2018-11-08 | Stop reason: HOSPADM

## 2018-10-30 RX ORDER — ACETAMINOPHEN 500 MG
1000 TABLET ORAL EVERY 8 HOURS
Status: DISCONTINUED | OUTPATIENT
Start: 2018-10-30 | End: 2018-11-08 | Stop reason: HOSPADM

## 2018-10-30 RX ORDER — SODIUM CHLORIDE, SODIUM LACTATE, POTASSIUM CHLORIDE, CALCIUM CHLORIDE 600; 310; 30; 20 MG/100ML; MG/100ML; MG/100ML; MG/100ML
INJECTION, SOLUTION INTRAVENOUS CONTINUOUS PRN
Status: DISCONTINUED | OUTPATIENT
Start: 2018-10-30 | End: 2018-10-30

## 2018-10-30 RX ORDER — CHLORHEXIDINE GLUCONATE ORAL RINSE 1.2 MG/ML
15 SOLUTION DENTAL 2 TIMES DAILY
Status: DISCONTINUED | OUTPATIENT
Start: 2018-10-30 | End: 2018-10-30 | Stop reason: SDUPTHER

## 2018-10-30 RX ORDER — SODIUM CHLORIDE, SODIUM LACTATE, POTASSIUM CHLORIDE, CALCIUM CHLORIDE 600; 310; 30; 20 MG/100ML; MG/100ML; MG/100ML; MG/100ML
INJECTION, SOLUTION INTRAVENOUS CONTINUOUS
Status: DISCONTINUED | OUTPATIENT
Start: 2018-10-30 | End: 2018-10-31

## 2018-10-30 RX ORDER — METRONIDAZOLE 500 MG/100ML
500 INJECTION, SOLUTION INTRAVENOUS
Status: COMPLETED | OUTPATIENT
Start: 2018-10-30 | End: 2018-10-30

## 2018-10-30 RX ORDER — SODIUM CHLORIDE 0.9 % (FLUSH) 0.9 %
3 SYRINGE (ML) INJECTION EVERY 8 HOURS
Status: DISCONTINUED | OUTPATIENT
Start: 2018-10-30 | End: 2018-11-05

## 2018-10-30 RX ORDER — BUMETANIDE 1 MG/1
2 TABLET ORAL EVERY MORNING
Status: DISCONTINUED | OUTPATIENT
Start: 2018-10-31 | End: 2018-10-31

## 2018-10-30 RX ORDER — MIDAZOLAM HYDROCHLORIDE 1 MG/ML
INJECTION, SOLUTION INTRAMUSCULAR; INTRAVENOUS
Status: DISCONTINUED | OUTPATIENT
Start: 2018-10-30 | End: 2018-10-30

## 2018-10-30 RX ORDER — NOREPINEPHRINE BITARTRATE/D5W 8 MG/250ML
0.02 PLASTIC BAG, INJECTION (ML) INTRAVENOUS CONTINUOUS
Status: DISCONTINUED | OUTPATIENT
Start: 2018-10-30 | End: 2018-11-01 | Stop reason: HOSPADM

## 2018-10-30 RX ORDER — ONDANSETRON 8 MG/1
8 TABLET, ORALLY DISINTEGRATING ORAL EVERY 8 HOURS PRN
Status: DISCONTINUED | OUTPATIENT
Start: 2018-10-30 | End: 2018-10-30 | Stop reason: HOSPADM

## 2018-10-30 RX ORDER — INSULIN ASPART 100 [IU]/ML
1-10 INJECTION, SOLUTION INTRAVENOUS; SUBCUTANEOUS EVERY 6 HOURS PRN
Status: DISCONTINUED | OUTPATIENT
Start: 2018-10-30 | End: 2018-11-08 | Stop reason: HOSPADM

## 2018-10-30 RX ORDER — ETOMIDATE 2 MG/ML
INJECTION INTRAVENOUS
Status: DISCONTINUED | OUTPATIENT
Start: 2018-10-30 | End: 2018-10-30

## 2018-10-30 RX ORDER — CHLORHEXIDINE GLUCONATE ORAL RINSE 1.2 MG/ML
10 SOLUTION DENTAL 2 TIMES DAILY
Status: COMPLETED | OUTPATIENT
Start: 2018-10-30 | End: 2018-11-04

## 2018-10-30 RX ORDER — FAMOTIDINE 10 MG/ML
20 INJECTION INTRAVENOUS 2 TIMES DAILY
Status: DISCONTINUED | OUTPATIENT
Start: 2018-10-30 | End: 2018-10-30

## 2018-10-30 RX ORDER — CARVEDILOL 12.5 MG/1
25 TABLET ORAL 2 TIMES DAILY
Status: DISCONTINUED | OUTPATIENT
Start: 2018-10-30 | End: 2018-10-31

## 2018-10-30 RX ORDER — ROCURONIUM BROMIDE 10 MG/ML
INJECTION, SOLUTION INTRAVENOUS
Status: DISCONTINUED | OUTPATIENT
Start: 2018-10-30 | End: 2018-10-30

## 2018-10-30 RX ORDER — ASPIRIN 81 MG/1
81 TABLET ORAL EVERY MORNING
Status: DISCONTINUED | OUTPATIENT
Start: 2018-10-31 | End: 2018-11-01

## 2018-10-30 RX ORDER — OXYCODONE HYDROCHLORIDE 5 MG/1
5 TABLET ORAL
Status: DISCONTINUED | OUTPATIENT
Start: 2018-10-30 | End: 2018-11-05

## 2018-10-30 RX ORDER — METOLAZONE 2.5 MG/1
2.5 TABLET ORAL
Status: DISCONTINUED | OUTPATIENT
Start: 2018-10-31 | End: 2018-10-31

## 2018-10-30 RX ORDER — NEOSTIGMINE METHYLSULFATE 1 MG/ML
INJECTION, SOLUTION INTRAVENOUS
Status: DISCONTINUED | OUTPATIENT
Start: 2018-10-30 | End: 2018-10-30

## 2018-10-30 RX ORDER — LISINOPRIL 2.5 MG/1
5 TABLET ORAL EVERY MORNING
Status: DISCONTINUED | OUTPATIENT
Start: 2018-10-31 | End: 2018-10-31

## 2018-10-30 RX ORDER — PHENYLEPHRINE HYDROCHLORIDE 10 MG/ML
INJECTION INTRAVENOUS
Status: DISCONTINUED | OUTPATIENT
Start: 2018-10-30 | End: 2018-10-30

## 2018-10-30 RX ORDER — ENOXAPARIN SODIUM 100 MG/ML
30 INJECTION SUBCUTANEOUS EVERY 24 HOURS
Status: DISCONTINUED | OUTPATIENT
Start: 2018-10-31 | End: 2018-10-30 | Stop reason: SDUPTHER

## 2018-10-30 RX ORDER — ACETAMINOPHEN 325 MG/1
975 TABLET ORAL ONCE
Status: DISCONTINUED | OUTPATIENT
Start: 2018-10-30 | End: 2018-10-30

## 2018-10-30 RX ORDER — DEXAMETHASONE SODIUM PHOSPHATE 4 MG/ML
INJECTION, SOLUTION INTRA-ARTICULAR; INTRALESIONAL; INTRAMUSCULAR; INTRAVENOUS; SOFT TISSUE
Status: DISCONTINUED | OUTPATIENT
Start: 2018-10-30 | End: 2018-10-30

## 2018-10-30 RX ORDER — METOCLOPRAMIDE HYDROCHLORIDE 5 MG/ML
10 INJECTION INTRAMUSCULAR; INTRAVENOUS EVERY 10 MIN PRN
Status: DISCONTINUED | OUTPATIENT
Start: 2018-10-30 | End: 2018-11-05

## 2018-10-30 RX ORDER — METOCLOPRAMIDE HYDROCHLORIDE 5 MG/ML
5 INJECTION INTRAMUSCULAR; INTRAVENOUS EVERY 6 HOURS PRN
Status: DISCONTINUED | OUTPATIENT
Start: 2018-10-30 | End: 2018-11-08 | Stop reason: HOSPADM

## 2018-10-30 RX ORDER — GLYCOPYRROLATE 0.2 MG/ML
INJECTION INTRAMUSCULAR; INTRAVENOUS
Status: DISCONTINUED | OUTPATIENT
Start: 2018-10-30 | End: 2018-10-30

## 2018-10-30 RX ORDER — PANTOPRAZOLE SODIUM 40 MG/1
40 TABLET, DELAYED RELEASE ORAL NIGHTLY
Status: DISCONTINUED | OUTPATIENT
Start: 2018-10-30 | End: 2018-11-08 | Stop reason: HOSPADM

## 2018-10-30 RX ORDER — SUCCINYLCHOLINE CHLORIDE 20 MG/ML
INJECTION INTRAMUSCULAR; INTRAVENOUS
Status: DISCONTINUED | OUTPATIENT
Start: 2018-10-30 | End: 2018-10-30

## 2018-10-30 RX ORDER — ALLOPURINOL 100 MG/1
100 TABLET ORAL DAILY
Status: DISCONTINUED | OUTPATIENT
Start: 2018-10-30 | End: 2018-11-08 | Stop reason: HOSPADM

## 2018-10-30 RX ORDER — SIMVASTATIN 5 MG/1
10 TABLET, FILM COATED ORAL NIGHTLY
Status: DISCONTINUED | OUTPATIENT
Start: 2018-10-30 | End: 2018-11-08 | Stop reason: HOSPADM

## 2018-10-30 RX ORDER — FENTANYL CITRATE 50 UG/ML
25 INJECTION, SOLUTION INTRAMUSCULAR; INTRAVENOUS
Status: DISCONTINUED | OUTPATIENT
Start: 2018-10-30 | End: 2018-11-01

## 2018-10-30 RX ORDER — FENTANYL CITRATE 50 UG/ML
INJECTION, SOLUTION INTRAMUSCULAR; INTRAVENOUS
Status: DISCONTINUED | OUTPATIENT
Start: 2018-10-30 | End: 2018-10-30

## 2018-10-30 RX ORDER — ONDANSETRON 8 MG/1
8 TABLET, ORALLY DISINTEGRATING ORAL EVERY 8 HOURS PRN
Status: DISCONTINUED | OUTPATIENT
Start: 2018-10-30 | End: 2018-11-08 | Stop reason: HOSPADM

## 2018-10-30 RX ORDER — FENTANYL CITRATE 50 UG/ML
50 INJECTION, SOLUTION INTRAMUSCULAR; INTRAVENOUS
Status: DISCONTINUED | OUTPATIENT
Start: 2018-10-30 | End: 2018-11-01

## 2018-10-30 RX ORDER — BUPIVACAINE HYDROCHLORIDE 5 MG/ML
INJECTION, SOLUTION EPIDURAL; INTRACAUDAL
Status: DISCONTINUED | OUTPATIENT
Start: 2018-10-30 | End: 2018-10-30 | Stop reason: HOSPADM

## 2018-10-30 RX ORDER — SODIUM CHLORIDE 0.9 % (FLUSH) 0.9 %
3 SYRINGE (ML) INJECTION
Status: DISCONTINUED | OUTPATIENT
Start: 2018-10-30 | End: 2018-11-08 | Stop reason: HOSPADM

## 2018-10-30 RX ADMIN — METRONIDAZOLE 500 MG: 500 SOLUTION INTRAVENOUS at 09:10

## 2018-10-30 RX ADMIN — BUPIVACAINE 266 MG: 13.3 INJECTION, SUSPENSION, LIPOSOMAL INFILTRATION at 11:10

## 2018-10-30 RX ADMIN — CHLORHEXIDINE GLUCONATE 10 ML: 1.2 RINSE ORAL at 08:10

## 2018-10-30 RX ADMIN — PHENYLEPHRINE HYDROCHLORIDE 200 MCG: 10 INJECTION INTRAVENOUS at 09:10

## 2018-10-30 RX ADMIN — SIMVASTATIN 10 MG: 5 TABLET, FILM COATED ORAL at 08:10

## 2018-10-30 RX ADMIN — SODIUM CHLORIDE, SODIUM LACTATE, POTASSIUM CHLORIDE, AND CALCIUM CHLORIDE: .6; .31; .03; .02 INJECTION, SOLUTION INTRAVENOUS at 02:10

## 2018-10-30 RX ADMIN — INSULIN ASPART 4 UNITS: 100 INJECTION, SOLUTION INTRAVENOUS; SUBCUTANEOUS at 06:10

## 2018-10-30 RX ADMIN — INSULIN ASPART 3 UNITS: 100 INJECTION, SOLUTION INTRAVENOUS; SUBCUTANEOUS at 11:10

## 2018-10-30 RX ADMIN — ROCURONIUM BROMIDE 30 MG: 10 INJECTION, SOLUTION INTRAVENOUS at 10:10

## 2018-10-30 RX ADMIN — NEOSTIGMINE METHYLSULFATE 5 MG: 1 INJECTION INTRAVENOUS at 01:10

## 2018-10-30 RX ADMIN — LIDOCAINE HYDROCHLORIDE 100 MG: 20 INJECTION, SOLUTION INTRAVENOUS at 08:10

## 2018-10-30 RX ADMIN — Medication 0.08 MCG/KG/MIN: at 02:10

## 2018-10-30 RX ADMIN — FENTANYL CITRATE 100 MCG: 50 INJECTION, SOLUTION INTRAMUSCULAR; INTRAVENOUS at 08:10

## 2018-10-30 RX ADMIN — SODIUM CHLORIDE: 900 INJECTION, SOLUTION INTRAVENOUS at 09:10

## 2018-10-30 RX ADMIN — ROCURONIUM BROMIDE 20 MG: 10 INJECTION, SOLUTION INTRAVENOUS at 09:10

## 2018-10-30 RX ADMIN — ROCURONIUM BROMIDE 20 MG: 10 INJECTION, SOLUTION INTRAVENOUS at 11:10

## 2018-10-30 RX ADMIN — NOREPINEPHRINE BITARTRATE 0.02 MCG/KG/MIN: 1 INJECTION, SOLUTION, CONCENTRATE INTRAVENOUS at 01:10

## 2018-10-30 RX ADMIN — Medication 3 ML: at 10:10

## 2018-10-30 RX ADMIN — SUCCINYLCHOLINE CHLORIDE 140 MG: 20 INJECTION, SOLUTION INTRAMUSCULAR; INTRAVENOUS at 08:10

## 2018-10-30 RX ADMIN — ROCURONIUM BROMIDE 30 MG: 10 INJECTION, SOLUTION INTRAVENOUS at 08:10

## 2018-10-30 RX ADMIN — SODIUM CHLORIDE: 900 INJECTION, SOLUTION INTRAVENOUS at 11:10

## 2018-10-30 RX ADMIN — MIDAZOLAM 2 MG: 1 INJECTION INTRAMUSCULAR; INTRAVENOUS at 08:10

## 2018-10-30 RX ADMIN — Medication 0.08 MCG/KG/MIN: at 10:10

## 2018-10-30 RX ADMIN — BIMATOPROST 1 DROP: 0.1 SOLUTION/ DROPS OPHTHALMIC at 08:10

## 2018-10-30 RX ADMIN — CEFTRIAXONE 2 G: 2 INJECTION, SOLUTION INTRAVENOUS at 09:10

## 2018-10-30 RX ADMIN — PANTOPRAZOLE SODIUM 40 MG: 40 TABLET, DELAYED RELEASE ORAL at 08:10

## 2018-10-30 RX ADMIN — ROBINUL 0.8 MG: 0.2 INJECTION INTRAMUSCULAR; INTRAVENOUS at 01:10

## 2018-10-30 RX ADMIN — ETOMIDATE 12 MG: 2 INJECTION, SOLUTION INTRAVENOUS at 08:10

## 2018-10-30 RX ADMIN — ONDANSETRON 4 MG: 2 INJECTION, SOLUTION INTRAMUSCULAR; INTRAVENOUS at 12:10

## 2018-10-30 RX ADMIN — SODIUM CHLORIDE, SODIUM LACTATE, POTASSIUM CHLORIDE, AND CALCIUM CHLORIDE: 600; 310; 30; 20 INJECTION, SOLUTION INTRAVENOUS at 08:10

## 2018-10-30 RX ADMIN — DEXAMETHASONE SODIUM PHOSPHATE 8 MG: 4 INJECTION, SOLUTION INTRA-ARTICULAR; INTRALESIONAL; INTRAMUSCULAR; INTRAVENOUS; SOFT TISSUE at 12:10

## 2018-10-30 RX ADMIN — ACETAMINOPHEN 1000 MG: 500 TABLET, FILM COATED ORAL at 09:10

## 2018-10-30 RX ADMIN — SODIUM CHLORIDE, SODIUM LACTATE, POTASSIUM CHLORIDE, AND CALCIUM CHLORIDE: .6; .31; .03; .02 INJECTION, SOLUTION INTRAVENOUS at 10:10

## 2018-10-30 RX ADMIN — ALBUMIN HUMAN 12.5 G: 0.05 INJECTION, SOLUTION INTRAVENOUS at 02:10

## 2018-10-30 RX ADMIN — ROCURONIUM BROMIDE 5 MG: 10 INJECTION, SOLUTION INTRAVENOUS at 08:10

## 2018-10-30 NOTE — SUBJECTIVE & OBJECTIVE
Past Medical History:   Diagnosis Date    Arthritis     CAD (coronary artery disease)     Cataract     CHF (congestive heart failure)     Dr Calvillo    Chronic combined systolic and diastolic congestive heart failure 3/24/2015    CKD (chronic kidney disease), stage III     Diabetes mellitus type II      AM    Diabetic retinopathy     anti Veg F injections for macular edema    Diverticulitis of colon     ED (erectile dysfunction)     Glaucoma     HTN (hypertension)     Hyperlipidemia     Ischemic cardiomyopathy     Obesity     JAHAIRA on CPAP     Pacemaker     Pulmonary HTN        Past Surgical History:   Procedure Laterality Date    CARDIAC CATHETERIZATION  2009    CHOLECYSTECTOMY      COLECTOMY-SIGMOID N/A 4/22/2016    Performed by Kevin Lindsay MD at [facility] OR    COLONOSCOPY N/A 10/11/2018    Procedure: COLONOSCOPY;  Surgeon: Quinn Aragon MD;  Location: [facility] ENDO;  Service: General;  Laterality: N/A;    COLONOSCOPY N/A 10/11/2018    Performed by Quinn Aragon MD at [facility] ENDO    COLOSTOMY N/A 4/22/2016    Performed by Kevin Lindsay MD at [facility] OR    EYE SURGERY      KNEE SURGERY         Review of patient's allergies indicates:  No Known Allergies    Family History     Problem Relation (Age of Onset)    Cancer Mother    Heart disease Father    No Known Problems Sister, Brother, Maternal Aunt, Maternal Uncle, Paternal Aunt, Paternal Uncle, Maternal Grandmother, Maternal Grandfather, Paternal Grandmother, Paternal Grandfather    Stroke Father        Tobacco Use    Smoking status: Never Smoker    Smokeless tobacco: Never Used   Substance and Sexual Activity    Alcohol use: Yes     Alcohol/week: 0.0 oz     Comment: NO ALCOHOL 72 HOURS PRIOR TO SX    Drug use: No    Sexual activity: Not Currently         Review of Systems   Constitutional: Positive for fatigue.   HENT: Negative for trouble swallowing.    Respiratory: Negative for cough and shortness of breath.     Gastrointestinal: Positive for abdominal pain (appropriate post op). Negative for nausea and vomiting.   Musculoskeletal: Positive for myalgias.   Skin: Positive for wound.   Neurological: Negative for tremors and speech difficulty.   Psychiatric/Behavioral: The patient is not nervous/anxious.      Objective:     Vital Signs (Most Recent):  Temp: 97.2 °F (36.2 °C) (10/30/18 1530)  Pulse: 68 (10/30/18 1530)  Resp: (!) 25 (10/30/18 1530)  BP: 108/65 (10/30/18 1530)  SpO2: 100 % (10/30/18 1530) Vital Signs (24h Range):  Temp:  [97.2 °F (36.2 °C)-97.5 °F (36.4 °C)] 97.2 °F (36.2 °C)  Pulse:  [] 68  Resp:  [12-30] 25  SpO2:  [92 %-100 %] 100 %  BP: ()/(38-83) 108/65     Weight: 95 kg (209 lb 7 oz)  Body mass index is 31.38 kg/m².      Intake/Output Summary (Last 24 hours) at 10/30/2018 1732  Last data filed at 10/30/2018 1500  Gross per 24 hour   Intake 2500 ml   Output 575 ml   Net 1925 ml       Physical Exam   Constitutional: He is oriented to person, place, and time. He appears well-developed. No distress. He is not intubated. Nasal cannula in place.   HENT:   Head: Atraumatic.   Eyes: Conjunctivae are normal. Pupils are equal, round, and reactive to light.   Neck: No JVD present. No tracheal deviation present.   Cardiovascular: Normal rate and regular rhythm.   No murmur heard.  Pulses:       Radial pulses are 1+ on the right side, and 1+ on the left side.        Dorsalis pedis pulses are 1+ on the right side, and 1+ on the left side.   Pulmonary/Chest: No accessory muscle usage. He is not intubated. No respiratory distress. He has decreased breath sounds. He has no wheezes. He has no rhonchi. He has no rales.   Abdominal: Soft. He exhibits no distension. Bowel sounds are decreased.       Musculoskeletal: He exhibits no edema.   Neurological: He is oriented to person, place, and time. GCS eye subscore is 3. GCS verbal subscore is 5. GCS motor subscore is 6.   Skin: Skin is warm and dry. Capillary refill  takes less than 2 seconds.       Vents:  Oxygen Concentration (%): 98 (10/30/18 1400)    Lines/Drains/Airways     Central Venous Catheter Line                 Percutaneous Central Line Insertion/Assessment - triple lumen  10/30/18 0849 right internal jugular less than 1 day          Drain                 Closed/Suction Drain 10/30/18 1300 Abdomen Bulb less than 1 day         Colostomy 10/30/18 1340 RLQ less than 1 day         Urethral Catheter 10/30/18 0856 Straight-tip;Non-latex 16 Fr. less than 1 day          Arterial Line                 Arterial Line 10/30/18 0902 Right Radial less than 1 day          Peripheral Intravenous Line                 Peripheral IV - Single Lumen 10/30/18 0803 Right Hand less than 1 day                Significant Labs:    CBC/Anemia Profile:  Recent Labs   Lab 10/30/18  1530   WBC 21.92*   HGB 10.0*   HCT 30.1*      MCV 86   RDW 16.5*        Chemistries:  Recent Labs   Lab 10/30/18  1530      K 4.0      CO2 22*   BUN 38*   CREATININE 2.5*   CALCIUM 7.6*       All pertinent labs within the past 24 hours have been reviewed.    Significant Imaging:   I have reviewed all pertinent imaging results/findings within the past 24 hours.

## 2018-10-30 NOTE — INTERVAL H&P NOTE
The patient has been examined and the H&P has been reviewed:    I concur with the findings and changes have been noted since the H&P was written: Mass found in the descending/transverse colon the time of colonoscopy.  Will proceed with colon resection, colostomy reversal and repair of peristomal hernia    Anesthesia/Surgery risks, benefits and alternative options discussed and understood by patient/family.          Active Hospital Problems    Diagnosis  POA    Colostomy in place [Z93.3]  Not Applicable      Resolved Hospital Problems   No resolved problems to display.

## 2018-10-30 NOTE — TRANSFER OF CARE
"Anesthesia Transfer of Care Note    Patient: Yan Choudhury    Procedure(s) Performed: Procedure(s) (LRB):  REVISION OR CLOSURE, COLOSTOMY (N/A)  COLECTOMY, PARTIAL (Right)  MOBILIZATION, SPLENIC FLEXURE (N/A)  CREATION, ILEOSTOMY (N/A)    Patient location: PACU    Anesthesia Type: general    Transport from OR: Transported from OR on room air with adequate spontaneous ventilation    Post pain: adequate analgesia    Post assessment: no apparent anesthetic complications and tolerated procedure well    Post vital signs: stable    Level of consciousness: awake    Nausea/Vomiting: no nausea/vomiting    Complications: none    Transfer of care protocol was followed      Last vitals:   Visit Vitals  /76 (BP Location: Left arm, Patient Position: Sitting)   Pulse 97   Temp 36.2 °C (97.2 °F) (Temporal)   Resp 18   Ht 5' 8.5" (1.74 m)   Wt 95 kg (209 lb 7 oz)   SpO2 100%   BMI 31.38 kg/m²     "

## 2018-10-30 NOTE — ASSESSMENT & PLAN NOTE
Post op hypotension responding well to low dose levophed  ICU hemodynamic monitoring  Wean levo as able

## 2018-10-30 NOTE — ANESTHESIA POSTPROCEDURE EVALUATION
"Anesthesia Post Evaluation    Patient: Yan Choudhury    Procedure(s) Performed: Procedure(s) (LRB):  REVISION OR CLOSURE, COLOSTOMY (N/A)  COLECTOMY, PARTIAL (Right)  MOBILIZATION, SPLENIC FLEXURE (N/A)  CREATION, ILEOSTOMY (N/A)    Final Anesthesia Type: general  Patient location during evaluation: PACU  Patient participation: Yes- Able to Participate  Level of consciousness: awake  Post-procedure vital signs: reviewed and stable (Required levophed)  Pain management: adequate  Airway patency: patent    Anesthetic complications: yes  Perioperative Events: hypotension requiring unaticipated pressor infusion    Cardiovascular status: blood pressure returned to baseline  Respiratory status: unassisted, spontaneous ventilation and room air  Hydration status: euvolemic  Follow-up not needed.        Visit Vitals  /65   Pulse 68   Temp 36.2 °C (97.2 °F) (Temporal)   Resp (!) 25   Ht 5' 8.5" (1.74 m)   Wt 95 kg (209 lb 7 oz)   SpO2 100%   BMI 31.38 kg/m²       Pain/Tahir Score: Pain Assessment Performed: Yes (10/30/2018  3:30 PM)  Presence of Pain: denies (10/30/2018  3:30 PM)  Tahir Score: 8 (10/30/2018  3:30 PM)        "

## 2018-10-30 NOTE — HOSPITAL COURSE
10/30 - Admitted to ICU after recovery from colon resection, ileostomy creation; hypotensive on low dose levophed to Rt IJ CL; rt radial arterial line in place; drowsy post anesthesia, appropriate when aroused, oriented x 3  10/31 - remains on pressor support with rising creatinine and marginal urine output overnight; hyperglycemic 222-302 overnight despite SSI  11/1 seen and examined. Sitting in chair. O2 sat 96% on 3 liters O2 via nasal cannula. Afebrile. POD#2

## 2018-10-30 NOTE — CONSULTS
[facility] -   Critical Care Medicine  Consult Note    Patient Name: Yan Choudhury  MRN: [id number]  Admission Date: 10/30/2018  Hospital Length of Stay: 0 days  Code Status: Prior  Attending Physician: Kevin Lindsay MD   Primary Care Provider: Sandra Estevez MD   Principal Problem: Ischemic cardiomyopathy      Subjective:     HPI:  63 year old male with extensive PMH including CAD, CHF, ischemic cardiomyopathy with EF 30-35%; CKD3, DM2, HTN, HLD, obesity  Colonoscopy performed in preparation for parastomal hernia repair and reversal of colostomy; colonoscopy identified mass    Admitted this am for colon resection, colostomy reversal and repair of peristomal hernia        Hospital/ICU Course:  Admitted to ICU after recovery from colon resection, ileostomy creation; hypotensive on low dose levophed to Rt IJ CL; rt radial arterial line in place; drowsy post anesthesia, appropriate when aroused, oriented x 3    Past Medical History:   Diagnosis Date    Arthritis     CAD (coronary artery disease)     Cataract     CHF (congestive heart failure)     Dr Calvillo    Chronic combined systolic and diastolic congestive heart failure 3/24/2015    CKD (chronic kidney disease), stage III     Diabetes mellitus type II      AM    Diabetic retinopathy     anti Veg F injections for macular edema    Diverticulitis of colon     ED (erectile dysfunction)     Glaucoma     HTN (hypertension)     Hyperlipidemia     Ischemic cardiomyopathy     Obesity     JAHAIRA on CPAP     Pacemaker     Pulmonary HTN        Past Surgical History:   Procedure Laterality Date    CARDIAC CATHETERIZATION  2009    CHOLECYSTECTOMY      COLECTOMY-SIGMOID N/A 4/22/2016    Per[facility] Kevin Lindsay [facility] at [facility] OR    COLONOSCOPY N/A 10/11/2018    Procedure: COLONOSCOPY;  Surgeon: Quinn Aragon MD;  Location: [facility];  Service: General;  Laterality: N/A;    COLONOSCOPY N/A 10/11/2018    Performed by Quinn Aragon MD  at [facility] ENDO    COLOSTOMY N/A 4/22/2016    Performed by Kevin Lindsay MD at [facility] OR    EYE SURGERY      KNEE SURGERY         Review of patient's allergies indicates:  No Known Allergies    Family History     Problem Relation (Age of Onset)    Cancer Mother    Heart disease Father    No Known Problems Sister, Brother, Maternal Aunt, Maternal Uncle, Paternal Aunt, Paternal Uncle, Maternal Grandmother, Maternal Grandfather, Paternal Grandmother, Paternal Grandfather    Stroke Father        Tobacco Use    Smoking status: Never Smoker    Smokeless tobacco: Never Used   Substance and Sexual Activity    Alcohol use: Yes     Alcohol/week: 0.0 oz     Comment: NO ALCOHOL 72 HOURS PRIOR TO SX    Drug use: No    Sexual activity: Not Currently         Review of Systems   Constitutional: Positive for fatigue.   HENT: Negative for trouble swallowing.    Respiratory: Negative for cough and shortness of breath.    Gastrointestinal: Positive for abdominal pain (appropriate post op). Negative for nausea and vomiting.   Musculoskeletal: Positive for myalgias.   Skin: Positive for wound.   Neurological: Negative for tremors and speech difficulty.   Psychiatric/Behavioral: The patient is not nervous/anxious.      Objective:     Vital Signs (Most Recent):  Temp: 97.2 °F (36.2 °C) (10/30/18 1530)  Pulse: 68 (10/30/18 1530)  Resp: (!) 25 (10/30/18 1530)  BP: 108/65 (10/30/18 1530)  SpO2: 100 % (10/30/18 1530) Vital Signs (24h Range):  Temp:  [97.2 °F (36.2 °C)-97.5 °F (36.4 °C)] 97.2 °F (36.2 °C)  Pulse:  [] 68  Resp:  [12-30] 25  SpO2:  [92 %-100 %] 100 %  BP: ()/(38-83) 108/65     Weight: 95 kg (209 lb 7 oz)  Body mass index is 31.38 kg/m².      Intake/Output Summary (Last 24 hours) at 10/30/2018 1732  Last data filed at 10/30/2018 1500  Gross per 24 hour   Intake 2500 ml   Output 575 ml   Net 1925 ml       Physical Exam   Constitutional: He is oriented to person, place, and time. He appears well-developed. No  distress. He is not intubated. Nasal cannula in place.   HENT:   Head: Atraumatic.   Eyes: Conjunctivae are normal. Pupils are equal, round, and reactive to light.   Neck: No JVD present. No tracheal deviation present.   Cardiovascular: Normal rate and regular rhythm.   No murmur heard.  Pulses:       Radial pulses are 1+ on the right side, and 1+ on the left side.        Dorsalis pedis pulses are 1+ on the right side, and 1+ on the left side.   Pulmonary/Chest: No accessory muscle usage. He is not intubated. No respiratory distress. He has decreased breath sounds. He has no wheezes. He has no rhonchi. He has no rales.   Abdominal: Soft. He exhibits no distension. Bowel sounds are decreased.       Musculoskeletal: He exhibits no edema.   Neurological: He is oriented to person, place, and time. GCS eye subscore is 3. GCS verbal subscore is 5. GCS motor subscore is 6.   Skin: Skin is warm and dry. Capillary refill takes less than 2 seconds.       Vents:  Oxygen Concentration (%): 98 (10/30/18 1400)    Lines/Drains/Airways     Central Venous Catheter Line                 Percutaneous Central Line Insertion/Assessment - triple lumen  10/30/18 0849 right internal jugular less than 1 day          Drain                 Closed/Suction Drain 10/30/18 1300 Abdomen Bulb less than 1 day         Colostomy 10/30/18 1340 RLQ less than 1 day         Urethral Catheter 10/30/18 0856 Straight-tip;Non-latex 16 Fr. less than 1 day          Arterial Line                 Arterial Line 10/30/18 0902 Right Radial less than 1 day          Peripheral Intravenous Line                 Peripheral IV - Single Lumen 10/30/18 0803 Right Hand less than 1 day                Significant Labs:    CBC/Anemia Profile:  Recent Labs   Lab 10/30/18  1530   WBC 21.92*   HGB 10.0*   HCT 30.1*      MCV 86   RDW 16.5*        Chemistries:  Recent Labs   Lab 10/30/18  1530      K 4.0      CO2 22*   BUN 38*   CREATININE 2.5*   CALCIUM 7.6*        All pertinent labs within the past 24 hours have been reviewed.    Significant Imaging:   I have reviewed all pertinent imaging results/findings within the past 24 hours.    Assessment/Plan:     Cardiac/Vascular   Chronic combined systolic and diastolic congestive heart failure    Strict I/O  Keep net even fluid balance     Ischemic cardiomyopathy    Post op hypotension responding well to low dose levophed  ICU hemodynamic monitoring  Wean levo as able     Renal/   CKD (chronic kidney disease) stage 4, GFR 15-29 ml/min    Avoid nephrotoxins  Strict I/O  Monitor creatinine     Endocrine   Obesity (BMI 30.0-34.9)    Recommend diet, lifestyle modifications for goal weight loss     Type 2 diabetes mellitus with stage 4 chronic kidney disease, with long-term current use of insulin    SSI with monitoring for glucose control and prevention of insulin toxicity     GI   S/P partial colectomy    With ileostomy placement   Surgery following  Diet per surgeon     Other   JAHAIRA on CPAP    Nocturnal CPAP         Critical Care Daily Checklist:    A: Awake: RASS Goal/Actual Goal:    Actual:     B: Spontaneous Breathing Trial Performed?     C: SAT & SBT Coordinated?  N/a                      D: Delirium: CAM-ICU Overall CAM-ICU: (P) Negative   E: Early Mobility Performed? ROM   F: Feeding Goal:    Status:     Current Diet Order   Procedures    Diet NPO Except for: Ice Chips, Sips with Medication     Order Specific Question:   Except for     Answer:   Ice Chips     Order Specific Question:   Except for     Answer:   Sips with Medication      AS: Analgesia/Sedation Prn analgesia   T: Thromboembolic Prophylaxis lovenox   H: HOB > 300 Yes   U: Stress Ulcer Prophylaxis (if needed) PPI   G: Glucose Control SSI   B: Bowel Function     I: Indwelling Catheter (Lines & Guzman) Necessity reviewed   D: De-escalation of Antimicrobials/Pharmacotherapies reviwed    Plan for the day/ETD Pressor support and hemodynamic monitoring    Code  Status:  Family/Goals of Care: Prior  Home on discharge   I have discussed case and plan of care in detail with Dr Plasencia; Status and plan of care were discussed with team on multidisciplinary rounds.    Critical Care Time: 45 minutes  Critical care was time spent personally by me on the following activities: development of treatment plan with patient or surrogate and bedside caregivers, discussions with consultants, evaluation of patient's response to treatment, examination of patient, ordering and performing treatments and interventions, ordering and review of laboratory studies, ordering and review of radiographic studies, pulse oximetry, re-evaluation of patient's condition. This critical care time did not overlap with that of any other provider or involve time for any procedures.    Thank you for your consult.     VASHTI Graham-BC  Critical Care Medicine  [facility]

## 2018-10-30 NOTE — ANESTHESIA PROCEDURE NOTES
Central Line    Diagnosis: colon mass  Patient location during procedure: done in OR  Procedure start time: 10/30/2018 8:49 AM  Timeout: 10/30/2018 8:48 AM  Procedure end time: 10/30/2018 9:02 AM  Staffing  Anesthesiologist: Courtney Choudhury MD  Performed: anesthesiologist   Anesthesiologist was present at the time of the procedure.  Preanesthetic Checklist  Completed: patient identified, surgical consent, pre-op evaluation, timeout performed, IV checked, risks and benefits discussed, monitors and equipment checked and anesthesia consent given  Indication  Indication: hemodynamic monitoring, vascular access     Anesthesia   general anesthesia    Central Line  Skin Prep: skin prepped with ChloraPrep, skin prep agent completely dried prior to procedure  maximum sterile barriers used during central venous catheter insertion  hand hygiene performed prior to central venous catheter insertion  Location: right internal jugular,   Catheter type: triple lumen  Catheter Size: 7 Fr  Inserted Catheter Length: 16 cm  Ultrasound: vascular probe with ultrasound  Vessel Caliber: patent, compressibility normal  Insertion Attempts: 1   Securement:line sutured, chlorhexidine patch, sterile dressing applied and blood return through all ports     Post-Procedure  X-Ray: placement verified by x-ray  Adverse Events:none  Additional Notes  Will check post op chest xray

## 2018-10-30 NOTE — ANESTHESIA PREPROCEDURE EVALUATION
10/30/2018  Yan Choudhury is a 63 y.o., male.    Pre-op Assessment    I have reviewed the Patient Summary Reports.     I have reviewed the Nursing Notes.   I have reviewed the Medications.     Review of Systems  Anesthesia Hx:  No problems with previous Anesthesia  History of prior surgery of interest to airway management or planning: Denies Family Hx of Anesthesia complications.   Denies Personal Hx of Anesthesia complications.   Social:  Non-Smoker    Hematology/Oncology:         -- Anemia:   EENT/Dental:   Diabetic retinopathy   Cardiovascular:   Hypertension CAD  Dysrhythmias  CHF ECG has been reviewed. Pulmonary HTN    ICD    Echo 10/18  CONCLUSIONS     1 - Biatrial enlargement.     2 - Mild left ventricular enlargement.     3 - Eccentric hypertrophy.     4 - Wall motion abnormalities.     5 - Moderately depressed left ventricular systolic function (EF 30-35%).     6 - Restrictive LV filling pattern, indicating markedly elevated LAP (grade 3 diastolic dysfunction).     7 - Right ventricular enlargement with low normal systolic function.     8 - The estimated PA systolic pressure is 35 mmHg.     9 - Mild tricuspid regurgitation.     10 - Moderate pulmonic regurgitation.     11 - Mild to moderate aortic regurgitation.        Pulmonary:   Sleep Apnea, CPAP    Renal/:   Chronic Renal Disease, CRI    Hepatic/GI:   GERD    Musculoskeletal:   gout   Endocrine:   Diabetes, type 2    Psych:  Psychiatric Normal           Physical Exam  General:  Obesity                 Anesthesia Plan  Type of Anesthesia, risks & benefits discussed:  Anesthesia Type:  general  Patient's Preference:   Intra-op Monitoring Plan: arterial line  Intra-op Monitoring Plan Comments:   Post Op Pain Control Plan: multimodal analgesia  Post Op Pain Control Plan Comments:   Induction:   IV  Beta Blocker:  Patient is on a Beta-Blocker and has  received one dose within the past 24 hours (No further documentation required).       Informed Consent: Patient understands risks and agrees with Anesthesia plan.  Questions answered.   ASA Score: 3     Day of Surgery Review of History & Physical: I have interviewed and examined the patient. I have reviewed the patient's H&P dated:  There are no significant changes.

## 2018-10-31 PROBLEM — N17.9 AKI (ACUTE KIDNEY INJURY): Status: ACTIVE | Noted: 2018-10-31

## 2018-10-31 LAB
ANION GAP SERPL CALC-SCNC: 11 MMOL/L
ANION GAP SERPL CALC-SCNC: 11 MMOL/L
ANISOCYTOSIS BLD QL SMEAR: SLIGHT
BASO STIPL BLD QL SMEAR: ABNORMAL
BASOPHILS # BLD AUTO: ABNORMAL K/UL
BASOPHILS NFR BLD: 0 %
BUN SERPL-MCNC: 46 MG/DL
BUN SERPL-MCNC: 50 MG/DL
CALCIUM SERPL-MCNC: 7.9 MG/DL
CALCIUM SERPL-MCNC: 8 MG/DL
CHLORIDE SERPL-SCNC: 104 MMOL/L
CHLORIDE SERPL-SCNC: 104 MMOL/L
CO2 SERPL-SCNC: 20 MMOL/L
CO2 SERPL-SCNC: 21 MMOL/L
CREAT SERPL-MCNC: 3.1 MG/DL
CREAT SERPL-MCNC: 3.4 MG/DL
DACRYOCYTES BLD QL SMEAR: ABNORMAL
DIFFERENTIAL METHOD: ABNORMAL
EOSINOPHIL # BLD AUTO: ABNORMAL K/UL
EOSINOPHIL NFR BLD: 0 %
ERYTHROCYTE [DISTWIDTH] IN BLOOD BY AUTOMATED COUNT: 16.3 %
EST. GFR  (AFRICAN AMERICAN): 21 ML/MIN/1.73 M^2
EST. GFR  (AFRICAN AMERICAN): 23 ML/MIN/1.73 M^2
EST. GFR  (NON AFRICAN AMERICAN): 18 ML/MIN/1.73 M^2
EST. GFR  (NON AFRICAN AMERICAN): 20 ML/MIN/1.73 M^2
GLUCOSE SERPL-MCNC: 281 MG/DL
GLUCOSE SERPL-MCNC: 293 MG/DL
HCT VFR BLD AUTO: 29.4 %
HGB BLD-MCNC: 9.8 G/DL
HYPOCHROMIA BLD QL SMEAR: ABNORMAL
LYMPHOCYTES # BLD AUTO: ABNORMAL K/UL
LYMPHOCYTES NFR BLD: 6 %
MAGNESIUM SERPL-MCNC: 1 MG/DL
MAGNESIUM SERPL-MCNC: 1.7 MG/DL
MCH RBC QN AUTO: 28.3 PG
MCHC RBC AUTO-ENTMCNC: 33.3 G/DL
MCV RBC AUTO: 85 FL
MONOCYTES # BLD AUTO: ABNORMAL K/UL
MONOCYTES NFR BLD: 3 %
MYELOCYTES NFR BLD MANUAL: 0 %
NEUTROPHILS NFR BLD: 88 %
NEUTS BAND NFR BLD MANUAL: 3 %
OVALOCYTES BLD QL SMEAR: ABNORMAL
PLATELET # BLD AUTO: 182 K/UL
PLATELET BLD QL SMEAR: ABNORMAL
PMV BLD AUTO: 12.4 FL
POCT GLUCOSE: 186 MG/DL (ref 70–110)
POCT GLUCOSE: 261 MG/DL (ref 70–110)
POCT GLUCOSE: 263 MG/DL (ref 70–110)
POCT GLUCOSE: 276 MG/DL (ref 70–110)
POIKILOCYTOSIS BLD QL SMEAR: SLIGHT
POLYCHROMASIA BLD QL SMEAR: ABNORMAL
POTASSIUM SERPL-SCNC: 4.2 MMOL/L
POTASSIUM SERPL-SCNC: 4.3 MMOL/L
RBC # BLD AUTO: 3.46 M/UL
SODIUM SERPL-SCNC: 135 MMOL/L
SODIUM SERPL-SCNC: 136 MMOL/L
STOMATOCYTES BLD QL SMEAR: PRESENT
TARGETS BLD QL SMEAR: ABNORMAL
WBC # BLD AUTO: 30.85 K/UL

## 2018-10-31 PROCEDURE — 25000003 PHARM REV CODE 250: Performed by: SURGERY

## 2018-10-31 PROCEDURE — 85027 COMPLETE CBC AUTOMATED: CPT

## 2018-10-31 PROCEDURE — A4216 STERILE WATER/SALINE, 10 ML: HCPCS | Performed by: ANESTHESIOLOGY

## 2018-10-31 PROCEDURE — 94799 UNLISTED PULMONARY SVC/PX: CPT

## 2018-10-31 PROCEDURE — 27000221 HC OXYGEN, UP TO 24 HOURS

## 2018-10-31 PROCEDURE — P9045 ALBUMIN (HUMAN), 5%, 250 ML: HCPCS | Mod: JG | Performed by: SURGERY

## 2018-10-31 PROCEDURE — 20000000 HC ICU ROOM

## 2018-10-31 PROCEDURE — 99291 CRITICAL CARE FIRST HOUR: CPT | Mod: ,,, | Performed by: NURSE PRACTITIONER

## 2018-10-31 PROCEDURE — 83735 ASSAY OF MAGNESIUM: CPT | Mod: 91

## 2018-10-31 PROCEDURE — S5571 INSULIN DISPOS PEN 3 ML: HCPCS | Performed by: NURSE PRACTITIONER

## 2018-10-31 PROCEDURE — 63600175 PHARM REV CODE 636 W HCPCS: Performed by: SURGERY

## 2018-10-31 PROCEDURE — 25000003 PHARM REV CODE 250: Performed by: ANESTHESIOLOGY

## 2018-10-31 PROCEDURE — 80048 BASIC METABOLIC PNL TOTAL CA: CPT | Mod: 91

## 2018-10-31 PROCEDURE — 80048 BASIC METABOLIC PNL TOTAL CA: CPT

## 2018-10-31 PROCEDURE — 25000003 PHARM REV CODE 250: Performed by: INTERNAL MEDICINE

## 2018-10-31 PROCEDURE — 99223 1ST HOSP IP/OBS HIGH 75: CPT | Mod: ,,, | Performed by: INTERNAL MEDICINE

## 2018-10-31 PROCEDURE — 94660 CPAP INITIATION&MGMT: CPT

## 2018-10-31 PROCEDURE — 63600175 PHARM REV CODE 636 W HCPCS: Performed by: NURSE PRACTITIONER

## 2018-10-31 PROCEDURE — 85007 BL SMEAR W/DIFF WBC COUNT: CPT

## 2018-10-31 PROCEDURE — 83735 ASSAY OF MAGNESIUM: CPT

## 2018-10-31 PROCEDURE — 63600175 PHARM REV CODE 636 W HCPCS: Performed by: INTERNAL MEDICINE

## 2018-10-31 RX ORDER — MAGNESIUM SULFATE HEPTAHYDRATE 40 MG/ML
2 INJECTION, SOLUTION INTRAVENOUS ONCE
Status: COMPLETED | OUTPATIENT
Start: 2018-10-31 | End: 2018-10-31

## 2018-10-31 RX ORDER — DEXTROSE MONOHYDRATE AND SODIUM CHLORIDE 5; .9 G/100ML; G/100ML
INJECTION, SOLUTION INTRAVENOUS CONTINUOUS
Status: DISCONTINUED | OUTPATIENT
Start: 2018-10-31 | End: 2018-11-01

## 2018-10-31 RX ORDER — ALBUMIN HUMAN 50 G/1000ML
25 SOLUTION INTRAVENOUS ONCE
Status: COMPLETED | OUTPATIENT
Start: 2018-10-31 | End: 2018-10-31

## 2018-10-31 RX ORDER — CARVEDILOL 12.5 MG/1
12.5 TABLET ORAL 2 TIMES DAILY
Status: DISCONTINUED | OUTPATIENT
Start: 2018-10-31 | End: 2018-11-08 | Stop reason: HOSPADM

## 2018-10-31 RX ADMIN — MAGNESIUM SULFATE IN WATER 2 G: 40 INJECTION, SOLUTION INTRAVENOUS at 04:10

## 2018-10-31 RX ADMIN — ONDANSETRON 8 MG: 8 TABLET, ORALLY DISINTEGRATING ORAL at 01:10

## 2018-10-31 RX ADMIN — INSULIN ASPART 6 UNITS: 100 INJECTION, SOLUTION INTRAVENOUS; SUBCUTANEOUS at 05:10

## 2018-10-31 RX ADMIN — ACETAMINOPHEN 1000 MG: 500 TABLET, FILM COATED ORAL at 05:10

## 2018-10-31 RX ADMIN — ASPIRIN 81 MG: 81 TABLET, COATED ORAL at 08:10

## 2018-10-31 RX ADMIN — Medication 0.04 MCG/KG/MIN: at 02:10

## 2018-10-31 RX ADMIN — SODIUM CHLORIDE, SODIUM LACTATE, POTASSIUM CHLORIDE, AND CALCIUM CHLORIDE: .6; .31; .03; .02 INJECTION, SOLUTION INTRAVENOUS at 05:10

## 2018-10-31 RX ADMIN — Medication 3 ML: at 05:10

## 2018-10-31 RX ADMIN — Medication 3 ML: at 09:10

## 2018-10-31 RX ADMIN — BIMATOPROST 1 DROP: 0.1 SOLUTION/ DROPS OPHTHALMIC at 08:10

## 2018-10-31 RX ADMIN — ALLOPURINOL 100 MG: 100 TABLET ORAL at 08:10

## 2018-10-31 RX ADMIN — CARVEDILOL 12.5 MG: 12.5 TABLET, FILM COATED ORAL at 08:10

## 2018-10-31 RX ADMIN — BUMETANIDE 2 MG: 1 TABLET ORAL at 07:10

## 2018-10-31 RX ADMIN — ACETAMINOPHEN 1000 MG: 500 TABLET, FILM COATED ORAL at 02:10

## 2018-10-31 RX ADMIN — SIMVASTATIN 10 MG: 5 TABLET, FILM COATED ORAL at 08:10

## 2018-10-31 RX ADMIN — ACETAMINOPHEN 1000 MG: 500 TABLET, FILM COATED ORAL at 09:10

## 2018-10-31 RX ADMIN — ALBUMIN HUMAN 25 G: 0.05 INJECTION, SOLUTION INTRAVENOUS at 08:10

## 2018-10-31 RX ADMIN — PANTOPRAZOLE SODIUM 40 MG: 40 TABLET, DELAYED RELEASE ORAL at 08:10

## 2018-10-31 RX ADMIN — DEXTROSE AND SODIUM CHLORIDE: 5; .9 INJECTION, SOLUTION INTRAVENOUS at 08:10

## 2018-10-31 RX ADMIN — FENTANYL CITRATE 50 MCG: 50 INJECTION INTRAMUSCULAR; INTRAVENOUS at 08:10

## 2018-10-31 RX ADMIN — OXYCODONE HYDROCHLORIDE 5 MG: 5 TABLET ORAL at 07:10

## 2018-10-31 RX ADMIN — ENOXAPARIN SODIUM 30 MG: 100 INJECTION SUBCUTANEOUS at 04:10

## 2018-10-31 RX ADMIN — METOCLOPRAMIDE 5 MG: 5 INJECTION, SOLUTION INTRAMUSCULAR; INTRAVENOUS at 05:10

## 2018-10-31 RX ADMIN — FENTANYL CITRATE 50 MCG: 50 INJECTION INTRAMUSCULAR; INTRAVENOUS at 05:10

## 2018-10-31 RX ADMIN — DEXTROSE AND SODIUM CHLORIDE: 5; .9 INJECTION, SOLUTION INTRAVENOUS at 07:10

## 2018-10-31 RX ADMIN — INSULIN ASPART 6 UNITS: 100 INJECTION, SOLUTION INTRAVENOUS; SUBCUTANEOUS at 01:10

## 2018-10-31 RX ADMIN — CHLORHEXIDINE GLUCONATE 10 ML: 1.2 RINSE ORAL at 08:10

## 2018-10-31 RX ADMIN — INSULIN ASPART 1 UNITS: 100 INJECTION, SOLUTION INTRAVENOUS; SUBCUTANEOUS at 11:10

## 2018-10-31 RX ADMIN — SODIUM CHLORIDE 500 ML: 0.9 INJECTION, SOLUTION INTRAVENOUS at 08:10

## 2018-10-31 RX ADMIN — Medication 3 ML: at 02:10

## 2018-10-31 RX ADMIN — INSULIN DETEMIR 10 UNITS: 100 INJECTION, SOLUTION SUBCUTANEOUS at 08:10

## 2018-10-31 NOTE — PROGRESS NOTES
Notified EICU, informed that pt's Magnesium level was 1 this AM. Also informed that pt's UOP has been oliguric, and pt's creatinine increased to 3.1 this morning.

## 2018-10-31 NOTE — EICU
Mg low- replete and recheck  Low urine output likely BROOKE on CKD - on pressors   May need HD   Bicarb 20 - monitor

## 2018-10-31 NOTE — PLAN OF CARE
10/31/18 1030   Medicare Message   Important Message from Medicare regarding Discharge Appeal Rights Given to patient/caregiver;Explained to patient/caregiver;Signed/date by patient/caregiver   Date IMM was signed 10/31/18   Time IMM was signed 1030

## 2018-10-31 NOTE — ASSESSMENT & PLAN NOTE
1. BROOKE on CKD stage 4 :  Patient's baseline serum creatinine about 2.5 mg/dL, acute kidney injury on chronic kidney disease due to hemodynamic reasons following major abdominal surgery, patient was hypotensive, blood pressure improved with IV fluids and pressor support, patient is nonoliguric, will follow his renal function closely, will stop Bumex and metolazone while receiving IV fluids,    2.  Hypotension - continue IV fluids, pressor support, monitor for fluid status closely as patient has cardiomyopathy with low LV ejection fraction,     3.  Anemia - multifactorial, PRBC transfusion when indicated    4.  Cardiomyopathy - was on diuretics prior to admission to the hospital will hold these at this time, can be restarted when indicated, he was on Bumex 2 mg twice a day and metolazone 2.5 mg 3 times a week on Monday Wednesday Friday,    5. Leukocytosis - abx per surgery service, [facility] ,     6. Stable lytes

## 2018-10-31 NOTE — ASSESSMENT & PLAN NOTE
Continue Levophed, weakness tolerated  Measures central venous pressure  Cardiology consultation per Critical Care if they feel it is needed

## 2018-10-31 NOTE — NURSING
Patient assessed for diabetes educational needs following chart review  He reports was diagnosed over 25 years ago and has attended diabetes education classes  He has a home glucose monitor and is consistent with administering his insulin.  He does not identify any diabetes educational needs at this time

## 2018-10-31 NOTE — PLAN OF CARE
Problem: Patient Care Overview  Goal: Plan of Care Review  Outcome: Ongoing (interventions implemented as appropriate)  Pt remains on levophed gtt now at 0.02 mcg/kg/min; CVP measures 2-3; Dr. Lindsay informed; pt with one episode of nausea; admin Zofran; full relief obtained; one episode of pain; admin Fentanyl 50mcg; full relief obtained; Dr. Lindsay at bedside this morning; initial drsg change; scant amount of serosanguinous drainage now present and marked; colostomy bag empty; pt denies flatuce; right radial art line removed; waveform dampened; unable to return to appropriate waveform; pt continues to have decreased urine output; Dr. Lindsay and Donita Burgos NP aware; Dr. Wang with renal consulted; med changes made; VSS; NADN.

## 2018-10-31 NOTE — SUBJECTIVE & OBJECTIVE
Review of Systems   Constitutional: Negative for fever.   HENT: Negative for sore throat.    Respiratory: Negative for shortness of breath.    Cardiovascular: Negative for chest pain.   Gastrointestinal: Positive for abdominal pain (appropriate post op). Negative for nausea.   Musculoskeletal: Positive for myalgias.   Neurological: Negative for focal weakness.   Psychiatric/Behavioral: The patient is not nervous/anxious.        Objective:     Vital Signs (Most Recent):  Temp: 99 °F (37.2 °C) (10/31/18 0305)  Pulse: 95 (10/31/18 0650)  Resp: 20 (10/31/18 0650)  BP: (!) 123/53 (10/31/18 0700)  SpO2: (S) 100 % (10/31/18 0810) Vital Signs (24h Range):  Temp:  [97.2 °F (36.2 °C)-99.6 °F (37.6 °C)] 99 °F (37.2 °C)  Pulse:  [] 95  Resp:  [12-30] 20  SpO2:  [92 %-100 %] 100 %  BP: ()/(38-99) 123/53     Weight: 96.4 kg (212 lb 8.4 oz)  Body mass index is 31.84 kg/m².      Intake/Output Summary (Last 24 hours) at 10/31/2018 0929  Last data filed at 10/31/2018 0600  Gross per 24 hour   Intake 4909.95 ml   Output 1040 ml   Net 3869.95 ml       Physical Exam   Constitutional: He is oriented to person, place, and time. He appears well-developed. No distress. He is not intubated. Nasal cannula in place.   HENT:   Head: Atraumatic.   Eyes: Conjunctivae are normal.   Glass rt eye   Neck: No JVD present. No tracheal deviation present.   Cardiovascular: Normal rate and regular rhythm.   No murmur heard.  Pulses:       Radial pulses are 1+ on the right side, and 1+ on the left side.        Dorsalis pedis pulses are 1+ on the right side, and 1+ on the left side.   Pulmonary/Chest: No accessory muscle usage. He is not intubated. No respiratory distress. He has decreased breath sounds. He has no wheezes. He has no rhonchi. He has no rales.   Abdominal: Soft. He exhibits no distension. Bowel sounds are decreased.       Musculoskeletal: He exhibits no edema.   Neurological: He is oriented to person, place, and time. GCS eye  subscore is 3. GCS verbal subscore is 5. GCS motor subscore is 6.   Skin: Skin is warm and dry. Capillary refill takes less than 2 seconds.       Vents:  Oxygen Concentration (%): 30 (10/30/18 2314)    Lines/Drains/Airways     Central Venous Catheter Line                 Percutaneous Central Line Insertion/Assessment - triple lumen  10/30/18 0849 right internal jugular 1 day          Drain                 Urethral Catheter 10/30/18 0856 Straight-tip;Non-latex 16 Fr. 1 day         Closed/Suction Drain 10/30/18 1300 Abdomen Bulb less than 1 day         Colostomy 10/30/18 1340 RLQ less than 1 day          Arterial Line                 Arterial Line 10/30/18 0902 Right Radial 1 day          Peripheral Intravenous Line                 Peripheral IV - Single Lumen 10/30/18 0803 Right Hand 1 day                Significant Labs:    CBC/Anemia Profile:  Recent Labs   Lab 10/30/18  1530 10/31/18  0340   WBC 21.92* 30.85*   HGB 10.0* 9.8*   HCT 30.1* 29.4*    182   MCV 86 85   RDW 16.5* 16.3*        Chemistries:  Recent Labs   Lab 10/30/18  1530 10/31/18  0340    135*   K 4.0 4.3    104   CO2 22* 20*   BUN 38* 46*   CREATININE 2.5* 3.1*   CALCIUM 7.6* 8.0*   MG  --  1.0*       All pertinent labs within the past 24 hours have been reviewed.    Significant Imaging:  I have reviewed all pertinent imaging results/findings within the past 24 hours.

## 2018-10-31 NOTE — OP NOTE
[facility]  Surgery Department  Operative Note    SUMMARY     Date of Procedure: 10/30/2018     Procedure: Procedure(s) (LRB):  REVISION OR CLOSURE, COLOSTOMY (N/A)  COLECTOMY, PARTIAL (Right)  MOBILIZATION, SPLENIC FLEXURE (N/A)  CREATION, ILEOSTOMY (N/A)     Surgeon(s) and Role:     * Kevin Lindsay MD - Primary     * Quinn Aragon MD - Assisting        Pre-Operative Diagnosis: Colostomy in place [Z93.3]  Parastomal hernia without obstruction or gangrene [K43.5]    Post-Operative Diagnosis: Post-Op Diagnosis Codes:     * Colostomy in place [Z93.3]     * Parastomal hernia without obstruction or gangrene [K43.5]    Anesthesia: General    Technical Procedures Used:  Open right hemicolectomy, mobilization of the splenic flexure, colostomy reversal, loop ileostomy    Description of the Findings of the Procedure:     Patient was brought into the operative room placed on the operative table supine position.  General endotracheal anesthesia was induced.  A central line and arterial line were placed by the anesthesia service.  IV antibiotics were administered.  A Guzman catheter was inserted. Pneumatic compression devices placed on lower extremities.  Patient was placed in the modified lithotomy position.  The colostomy was sewn closed.  The abdomen was prepped and draped in a standard manner.    A time-out was performed    A midline incision was made.  Subcutaneous tissues were dissected using electrocautery. The fascia was identified and opened in the midline.  Adhesions to the omentum to the undersurface the faster for freed with electrocautery. We then defined the colostomy in the parastomal hernia ill on the patient's left side.  We cleared the small bowel from its adhesions to the limb of colon extending through the colostomy.  We reduced the omentum from the parastomal hernia.    An elliptical incision was then made around the colostomy site.  It was  from the subcutaneous tissues  and reduced into the abdominal cavity.    Attention was then turned to tracing the colon proximally.  A mass in the transverse colon was identified.  This was just to the right of the midline.  A larger mass at the hepatic flexure was identified.    A right hemicolectomy was carried out by incising the right long Toldt and mobilizing the colon medially.  The duodenum and ureter were protected.  Hepatic choly met was taken down with a large bipolar device The terminal ileum was then transected with a ANGELA 75 stapler..  An appropriate place on the transverse colon just distal to the 2nd mass was identified in the transverse colon was divided. The mesentery was taken down with the large bipolar device, the ileocolic vessels were ligated as well.    At this point we turned our attention to the pelvis.  We identified the staple line from the old anastomosis. This area was fairly scarred but we were able to identify the rectal stump and dissected out for approximately 6 cm.  The green contour stapler was then used to divide the rectal stump.    Attention was then turned to the right colon.  A side-to-side anastomosis was carried out by opening the anti mesenteric borders of the terminal ileum and the transverse colon.  The limbs of the ANGELA 75 stapler were inserted.  Stapler was activated and a side-to-side anastomosis was created.  The opening in the anastomosis was closed by a 2nd firing of the ANGELA 75 stapler.  The staple lines were reinforced with 3 0 silk in a Lembert fashion.  The mesenteric defect was closed with 3 0 Vicryl in interrupted fashion.    The splenic flexure was then mobilized using the large bipolar device.  The transected and of the colostomy easily reached into the pelvis.    The colostomy was then opened. It was sized to an EEA 29  The distal portion was passed off and sent for pathologic analysis.  The anvil of the stapler was placed into the descending colon and brought through the wall of the colon.   The opening in the transverse colon was closed with the ANGELA 75 stapler.  The staple lines were reinforced with silk sutures in a Lembert fashion.    The EEA 29 stapler was then inserted into the rectum.  The stapler was advanced to the rectal stump.  The probe of the stapler was extended and brought through the rectal stump.  The stapler was connected to the anvil the staple was closed and activated.  There were intact anastomotic donuts.  The staple line was inspected with the proctoscope and was found to be intact however there was leakage of air.    The staple line was visually inspected and no openings could be seen.  The staple line was reinforced with 3 0 silk in a Lembert fashion and then covered with evaseal.    Because of the air leak the decision was made to create a protective ileostomy.  A 3 cm opening of skin was created in the right upper quadrant.  Subcutaneous tissues were divided.  The anterior fascia of the rectus muscle was divided.  The rectus muscle was split and the posterior fascia was divided.  A loop of ileum approximately 30 cm from the anastomosis was brought through the abdominal wall.  It was held in place by a small piece of tubing that was secured to the fascia.    A 15 Wolof Kenny-Kohli drain was placed in the left lower quadrant and brought into the pelvis    Marcaine and Exparel were infiltrated.  The internal fascia of the parastomal hernia was closed with 1.  Prolene suture in a figure-of-eight fashion.    Seprafilm was placed over the omentum were the mild line incision was to be closes    The midline fascia was closed with looped PDS in a running fashion.  The deep layers of the midline incision were closed with 2 0 Vicryl.  The skin was closed with staples.    The external fascia of the colostomy site/parastomal hernia was closed with 1.  PDS in a running fashion.  The colostomy site was left open and packed with gauze.    A colostomy bag was placed.    Dry sterile  dressings were placed.    The patient tolerated the procedure well was transferred to the ICU in hemodynamically stable but overall guarded condition.  A postoperative chest x-ray will be obtained    Significant Surgical Tasks Conducted by the Assistant(s), if Applicable:  Assistance with all aspects of the above operative procedure    Complications: No    Estimated Blood Loss (EBL): 325 mL  IV fluids 2600 mL  Exparel 266 mg  Marcaine 0.25% 30 mL             Implants:   Implant Name Type Inv. Item Serial No.  Lot No. LRB No. Used   MEMBRANE SEPRAFILM 5 X 6 - [id number]  MEMBRANE SEPRAFILM 5 X 6  GENZYME DUARTE [id number] N/A 1   MEMBRANE SEPRAFILM 5 X 6 - [id number]  MEMBRANE SEPRAFILM 5 X 6  GENZYME DUARTE [id number] N/A 1       Specimens:   Specimen (12h ago, onward)    Start     Ordered    10/30/18 1313  Specimen to Pathology - Surgery  Once     Comments:  #1 Right Colon and Terminal Ileum#2 Rectal Stump#3 End Colostomy#4 Anastomotic Donuts#5 Omentum     Start Status   10/30/18 1313 Collected (10/30/18 1313)       10/30/18 1312                  Condition: Critical    Disposition: ICU - extubated and stable.    Attestation: I performed the procedure.

## 2018-10-31 NOTE — SUBJECTIVE & OBJECTIVE
Past Medical History:   Diagnosis Date    Arthritis     CAD (coronary artery disease)     Cataract     CHF (congestive heart failure)     Dr Calvillo    Chronic combined systolic and diastolic congestive heart failure 3/24/2015    CKD (chronic kidney disease), stage III     Diabetes mellitus type II      AM    Diabetic retinopathy     anti Veg F injections for macular edema    Diverticulitis of colon     ED (erectile dysfunction)     Glaucoma     HTN (hypertension)     Hyperlipidemia     Ischemic cardiomyopathy     Obesity     JAHAIRA on CPAP     Pacemaker     Pulmonary HTN        Past Surgical History:   Procedure Laterality Date    CARDIAC CATHETERIZATION  2009    CHOLECYSTECTOMY      COLECTOMY-SIGMOID N/A 4/22/2016    Performed by Kevin Lindsay MD at [facility]    COLONOSCOPY N/A 10/11/2018    Procedure: COLONOSCOPY;  Surgeon: Quinn Aragon MD;  Location: [facility] ENDO;  Service: General;  Laterality: N/A;    COLONOSCOPY N/A 10/11/2018    Performed by Quinn Aragon MD at [facility] ENDO    COLOSTOMY N/A 4/22/2016    Performed by Kevin Lindsay MD at [facility] OR    EYE SURGERY      KNEE SURGERY         Review of patient's allergies indicates:  No Known Allergies  Current Facility-Administered Medications   Medication Frequency    acetaminophen tablet 1,000 mg Q8H    allopurinol tablet 100 mg Daily    aspirin EC tablet 81 mg QAM    bimatoprost 0.01 % ophthalmic drops 1 drop QHS    bumetanide tablet 2 mg QAM    carvedilol tablet 25 mg BID    chlorhexidine 0.12 % solution 10 mL BID    dextrose 5 % and 0.9 % NaCl infusion Continuous    dextrose 50% injection 12.5 g PRN    enoxaparin injection 30 mg Daily    fentaNYL injection 25 mcg Q2H PRN    fentaNYL injection 50 mcg Q2H PRN    glucagon (human recombinant) injection 1 mg PRN    insulin aspart U-100 pen 1-10 Units Q6H PRN    insulin detemir U-100 pen 10 Units Daily    metoclopramide HCl injection 10 mg Q10 Min PRN     metoclopramide HCl injection 5 mg Q6H PRN    metOLazone tablet 2.5 mg Every Mon, Wed, Fri    morphine injection 2 mg Q5 Min PRN    NORepinephrine bitartrate 8 mg in dextrose 5% 250 mL infusion Continuous    nozaseptin (NOZIN) nasal  BID    ondansetron disintegrating tablet 8 mg Q8H PRN    oxyCODONE immediate release tablet 5 mg Q3H PRN    pantoprazole EC tablet 40 mg QHS    simvastatin tablet 10 mg QHS    sodium chloride 0.9% flush 3 mL Q8H    sodium chloride 0.9% flush 3 mL PRN     Family History     Problem Relation (Age of Onset)    Cancer Mother    Heart disease Father    No Known Problems Sister, Brother, Maternal Aunt, Maternal Uncle, Paternal Aunt, Paternal Uncle, Maternal Grandmother, Maternal Grandfather, Paternal Grandmother, Paternal Grandfather    Stroke Father        Tobacco Use    Smoking status: Never Smoker    Smokeless tobacco: Never Used   Substance and Sexual Activity    Alcohol use: Yes     Alcohol/week: 0.0 oz     Comment: NO ALCOHOL 72 HOURS PRIOR TO SX    Drug use: No    Sexual activity: Not Currently     Review of Systems   Constitutional: Positive for activity change and fatigue. Negative for appetite change.   HENT: Negative for congestion and facial swelling.    Eyes: Negative for pain, discharge and redness.   Respiratory: Negative for apnea, cough and chest tightness.    Cardiovascular: Negative for chest pain, palpitations and leg swelling.   Gastrointestinal: Negative for abdominal distention.   Genitourinary: Negative for difficulty urinating, dysuria and frequency.   Musculoskeletal: Negative for neck pain and neck stiffness.   Skin: Negative for color change, rash and wound.   Neurological: Negative for dizziness, weakness and numbness.   Psychiatric/Behavioral: Negative for sleep disturbance.   All other systems reviewed and are negative.    Objective:     Vital Signs (Most Recent):  Temp: 99 °F (37.2 °C) (10/31/18 0305)  Pulse: 95 (10/31/18 0650)  Resp: 20  (10/31/18 0650)  BP: (!) 123/53 (10/31/18 0700)  SpO2: (S) 100 % (10/31/18 0810)  O2 Device (Oxygen Therapy): (S) room air (10/31/18 0810) Vital Signs (24h Range):  Temp:  [97.2 °F (36.2 °C)-99.6 °F (37.6 °C)] 99 °F (37.2 °C)  Pulse:  [] 95  Resp:  [12-30] 20  SpO2:  [92 %-100 %] 100 %  BP: ()/(38-99) 123/53     Weight: 96.4 kg (212 lb 8.4 oz) (10/31/18 0535)  Body mass index is 31.84 kg/m².  Body surface area is 2.16 meters squared.    I/O last 3 completed shifts:  In: 4910 [P.O.:480; I.V.:4430]  Out: 1040 [Urine:455; Drains:260; Blood:325]    Physical Exam   Constitutional: He is oriented to person, place, and time. He appears well-developed and well-nourished. No distress.   HENT:   Head: Normocephalic and atraumatic.   Eyes: Pupils are equal, round, and reactive to light.   Neck: Normal range of motion. Neck supple. No tracheal deviation present. No thyromegaly present.   Cardiovascular: Normal rate, regular rhythm and intact distal pulses. Exam reveals no gallop and no friction rub.   Murmur heard.  Pulmonary/Chest: Breath sounds normal. He has no wheezes. He has no rales.   Abdominal: Soft. He exhibits no mass. There is no tenderness. There is no rebound and no guarding.   Colostomy in place, dressings clean,    Musculoskeletal: Normal range of motion. He exhibits no edema.   Lymphadenopathy:     He has no cervical adenopathy.   Neurological: He is alert and oriented to person, place, and time.   Skin: Skin is warm. No rash noted. He is not diaphoretic. No erythema.   Nursing note and vitals reviewed.      Significant Labs:    CBC:   Recent Labs   Lab 10/31/18  0340   WBC 30.85*   RBC 3.46*   HGB 9.8*   HCT 29.4*      MCV 85   MCH 28.3   MCHC 33.3     CMP:   Recent Labs   Lab 10/31/18  1114   *   CALCIUM 7.9*      K 4.2   CO2 21*      BUN 50*   CREATININE 3.4*     All labs within the past 24 hours have been reviewed.    Lab Results   Component Value Date    .0 (H)  08/10/2018    CALCIUM 7.9 (L) 10/31/2018    PHOS 3.9 08/10/2018         Significant Imaging:  Reviewed

## 2018-10-31 NOTE — CONSULTS
[facility] -   Nephrology  Consult Note      Patient Name: Yan Choudhury  MRN: [id number]  Admission Date: 10/30/2018  Hospital Length of Stay: 1 days  Attending Provider: Kevin Lindsay MD   Primary Care Physician: Sandra Estevez MD  Principal Problem:Ischemic cardiomyopathy    Consults  Subjective:     HPI: Yan Choudhury is a pleasant 63-year-old  gentleman with history of congestive heart failure, CAD, cardiomyopathy, LV ejection fraction about 30%, CKD stage 3/4, baseline creatinine about 2.5 mg/dL, was admitted to the hospital for elective to surgery, he underwent hemicolectomy and revision colostomy, patient's serum creatinine increased from a baseline of 2.5 to 3.4 mg/dL, his urine output also has been about 30 mL/hour, we were consulted for acute on chronic kidney failure, patient was hypotensive following his surgery requiring pressor support.    Past Medical History:   Diagnosis Date    Arthritis     CAD (coronary artery disease)     Cataract     CHF (congestive heart failure)     Dr Calvillo    Chronic combined systolic and diastolic congestive heart failure 3/24/2015    CKD (chronic kidney disease), stage III     Diabetes mellitus type II      AM    Diabetic retinopathy     anti Veg F injections for macular edema    Diverticulitis of colon     ED (erectile dysfunction)     Glaucoma     HTN (hypertension)     Hyperlipidemia     Ischemic cardiomyopathy     Obesity     JAHAIRA on CPAP     Pacemaker     Pulmonary HTN        Past Surgical History:   Procedure Laterality Date    CARDIAC CATHETERIZATION  2009    CHOLECYSTECTOMY      COLECTOMY-SIGMOID N/A 4/22/2016    Performed by Kevin Lindsay MD at [facility] OR    COLONOSCOPY N/A 10/11/2018    Procedure: COLONOSCOPY;  Surgeon: Quinn Aragon MD;  Location: [facility] ENDO;  Service: General;  Laterality: N/A;    COLONOSCOPY N/A 10/11/2018    Performed by Quinn Aragon MD at [facility] ENDO    COLOSTOMY N/A 4/22/2016     [facility] OR    EYE SURGERY      KNEE SURGERY         Review of patient's allergies indicates:  No Known Allergies  Current Facility-Administered Medications   Medication Frequency    acetaminophen tablet 1,000 mg Q8H    allopurinol tablet 100 mg Daily    aspirin EC tablet 81 mg QAM    bimatoprost 0.01 % ophthalmic drops 1 drop QHS    bumetanide tablet 2 mg QAM    carvedilol tablet 25 mg BID    chlorhexidine 0.12 % solution 10 mL BID    dextrose 5 % and 0.9 % NaCl infusion Continuous    dextrose 50% injection 12.5 g PRN    enoxaparin injection 30 mg Daily    fentaNYL injection 25 mcg Q2H PRN    fentaNYL injection 50 mcg Q2H PRN    glucagon (human recombinant) injection 1 mg PRN    insulin aspart U-100 pen 1-10 Units Q6H PRN    insulin detemir U-100 pen 10 Units Daily    metoclopramide HCl injection 10 mg Q10 Min PRN    metoclopramide HCl injection 5 mg Q6H PRN    metOLazone tablet 2.5 mg Every Mon, Wed, Fri    morphine injection 2 mg Q5 Min PRN    NORepinephrine bitartrate 8 mg in dextrose 5% 250 mL infusion Continuous    nozaseptin (NOZIN) nasal  BID    ondansetron disintegrating tablet 8 mg Q8H PRN    oxyCODONE immediate release tablet 5 mg Q3H PRN    pantoprazole EC tablet 40 mg QHS    simvastatin tablet 10 mg QHS    sodium chloride 0.9% flush 3 mL Q8H    sodium chloride 0.9% flush 3 mL PRN     Family History     Problem Relation (Age of Onset)    Cancer Mother    Heart disease Father    No Known Problems Sister, Brother, Maternal Aunt, Maternal Uncle, Paternal Aunt, Paternal Uncle, Maternal Grandmother, Maternal Grandfather, Paternal Grandmother, Paternal Grandfather    Stroke Father        Tobacco Use    Smoking status: Never Smoker    Smokeless tobacco: Never Used   Substance and Sexual Activity    Alcohol use: Yes     Alcohol/week: 0.0 oz     Comment: NO ALCOHOL 72 HOURS PRIOR TO SX    Drug use: No    Sexual activity: Not Currently      Review of Systems   Constitutional: Positive for activity change and fatigue. Negative for appetite change.   HENT: Negative for congestion and facial swelling.    Eyes: Negative for pain, discharge and redness.   Respiratory: Negative for apnea, cough and chest tightness.    Cardiovascular: Negative for chest pain, palpitations and leg swelling.   Gastrointestinal: Negative for abdominal distention.   Genitourinary: Negative for difficulty urinating, dysuria and frequency.   Musculoskeletal: Negative for neck pain and neck stiffness.   Skin: Negative for color change, rash and wound.   Neurological: Negative for dizziness, weakness and numbness.   Psychiatric/Behavioral: Negative for sleep disturbance.   All other systems reviewed and are negative.    Objective:     Vital Signs (Most Recent):  Temp: 99 °F (37.2 °C) (10/31/18 0305)  Pulse: 95 (10/31/18 0650)  Resp: 20 (10/31/18 0650)  BP: (!) 123/53 (10/31/18 0700)  SpO2: (S) 100 % (10/31/18 0810)  O2 Device (Oxygen Therapy): (S) room air (10/31/18 0810) Vital Signs (24h Range):  Temp:  [97.2 °F (36.2 °C)-99.6 °F (37.6 °C)] 99 °F (37.2 °C)  Pulse:  [] 95  Resp:  [12-30] 20  SpO2:  [92 %-100 %] 100 %  BP: ()/(38-99) 123/53     Weight: 96.4 kg (212 lb 8.4 oz) (10/31/18 0535)  Body mass index is 31.84 kg/m².  Body surface area is 2.16 meters squared.    I/O last 3 completed shifts:  In: 4910 [P.O.:480; I.V.:4430]  Out: 1040 [Urine:455; Drains:260; Blood:325]    Physical Exam   Constitutional: He is oriented to person, place, and time. He appears well-developed and well-nourished. No distress.   HENT:   Head: Normocephalic and atraumatic.   Eyes: Pupils are equal, round, and reactive to light.   Neck: Normal range of motion. Neck supple. No tracheal deviation present. No thyromegaly present.   Cardiovascular: Normal rate, regular rhythm and intact distal pulses. Exam reveals no gallop and no friction rub.   Murmur heard.  Pulmonary/Chest: Breath sounds  normal. He has no wheezes. He has no rales.   Abdominal: Soft. He exhibits no mass. There is no tenderness. There is no rebound and no guarding.   Colostomy in place, dressings clean,    Musculoskeletal: Normal range of motion. He exhibits no edema.   Lymphadenopathy:     He has no cervical adenopathy.   Neurological: He is alert and oriented to person, place, and time.   Skin: Skin is warm. No rash noted. He is not diaphoretic. No erythema.   Nursing note and vitals reviewed.      Significant Labs:    CBC:   Recent Labs   Lab 10/31/18  0340   WBC 30.85*   RBC 3.46*   HGB 9.8*   HCT 29.4*      MCV 85   MCH 28.3   MCHC 33.3     CMP:   Recent Labs   Lab 10/31/18  1114   *   CALCIUM 7.9*      K 4.2   CO2 21*      BUN 50*   CREATININE 3.4*     All labs within the past 24 hours have been reviewed.    Lab Results   Component Value Date    .0 (H) 08/10/2018    CALCIUM 7.9 (L) 10/31/2018    PHOS 3.9 08/10/2018         Significant Imaging:  Reviewed     Assessment/Plan:     BROOKE (acute kidney injury)    1. BROOKE on CKD stage 4 :  Patient's baseline serum creatinine about 2.5 mg/dL, acute kidney injury on chronic kidney disease due to hemodynamic reasons following major abdominal surgery, patient was hypotensive, blood pressure improved with IV fluids and pressor support, patient is nonoliguric, will follow his renal function closely, will stop Bumex and metolazone while receiving IV fluids,    2.  Hypotension - continue IV fluids, pressor support, monitor for fluid status closely as patient has cardiomyopathy with low LV ejection fraction,     3.  Anemia - multifactorial, PRBC transfusion when indicated    4.  Cardiomyopathy - was on diuretics prior to admission to the hospital will hold these at this time, can be restarted when indicated, he was on Bumex 2 mg twice a day and metolazone 2.5 mg 3 times a week on Monday Wednesday Friday,    5. Leukocytosis - abx per surgery service, [facility] ,     6.  Tani howard          Thank you for your consult. I will follow-up with patient. Please contact us if you have any additional questions.       Total time spent 70 minutes including time needed to review the records,  patient  evaluation, documentation, face-to-face discussion with the patient,  wife, primary and critical care teams,   more than 50% of the time was spent on coordination of care and counseling.       Hernandez Martinez MD   Nephrology  [facility]

## 2018-10-31 NOTE — SUBJECTIVE & OBJECTIVE
Interval History:  Pain is controlled, requiring Levophed, marginal urine output, slightly worsening BUN and creatinine from the baseline    Fluid bolus, Nephrology consultation    Medications:  Continuous Infusions:   dextrose 5 % and 0.9 % NaCl      norepinephrine bitartrate-D5W 0.14 mcg/kg/min (10/31/18 0600)     Scheduled Meds:   acetaminophen  1,000 mg Oral Q8H    allopurinol  100 mg Oral Daily    aspirin  81 mg Oral QAM    bimatoprost  1 drop Both Eyes QHS    bumetanide  2 mg Oral QAM    carvedilol  25 mg Oral BID    chlorhexidine  10 mL Mouth/Throat BID    enoxaparin  30 mg Subcutaneous Daily    insulin detemir U-100  10 Units Subcutaneous Daily    metOLazone  2.5 mg Oral Every Mon, Wed, Fri    nozaseptin   Each Nare BID    pantoprazole  40 mg Oral QHS    simvastatin  10 mg Oral QHS    sodium chloride 0.9%  500 mL Intravenous Once    sodium chloride 0.9%  3 mL Intravenous Q8H     PRN Meds:dextrose 50%, fentaNYL, fentaNYL, glucagon (human recombinant), insulin aspart U-100, metoclopramide HCl, metoclopramide HCl, morphine, ondansetron, oxyCODONE, sodium chloride 0.9%     Review of patient's allergies indicates:  No Known Allergies  Objective:     Vital Signs (Most Recent):  Temp: 99 °F (37.2 °C) (10/31/18 0305)  Pulse: 95 (10/31/18 0650)  Resp: 20 (10/31/18 0650)  BP: (!) 123/53 (10/31/18 0700)  SpO2: (S) 100 % (10/31/18 0810) Vital Signs (24h Range):  Temp:  [97.2 °F (36.2 °C)-99.6 °F (37.6 °C)] 99 °F (37.2 °C)  Pulse:  [] 95  Resp:  [12-30] 20  SpO2:  [92 %-100 %] 100 %  BP: ()/(38-99) 123/53     Weight: 96.4 kg (212 lb 8.4 oz)  Body mass index is 31.84 kg/m².    Intake/Output - Last 3 Shifts       10/29 0700 - 10/30 0659 10/30 0700 - 10/31 0659 10/31 0700 - 11/01 0659    P.O.  480     I.V. (mL/kg)  4430 (46)     Total Intake(mL/kg)  4910 (50.9)     Urine (mL/kg/hr)  455 (0.2)     Drains  260     Blood  325     Total Output  1040     Net  +3870                  Physical Exam    Constitutional: No distress.   Slightly chronically ill   HENT:   Head: Normocephalic and atraumatic.   Neck: Normal range of motion. Neck supple.   Cardiovascular: Normal rate and regular rhythm.   Pulmonary/Chest: Effort normal and breath sounds normal.   Abdominal: Soft. Bowel sounds are normal. He exhibits no distension. Tenderness: Mild.   Midline incision is clean.  The left abdominal colostomy site is clean.  There is a right upper quadrant ileostomy with a edematous but pink small-bowel   Genitourinary:   Genitourinary Comments: Guzman in place   Neurological: He is alert.   Skin: Skin is warm and dry.   Vitals reviewed.      Significant Labs:  CBC:   Recent Labs   Lab 10/31/18  0340   WBC 30.85*   RBC 3.46*   HGB 9.8*   HCT 29.4*      MCV 85   MCH 28.3   MCHC 33.3     BMP:   Recent Labs   Lab 10/31/18  0340   *   *   K 4.3      CO2 20*   BUN 46*   CREATININE 3.1*   CALCIUM 8.0*   MG 1.0*     CMP:   Recent Labs   Lab 10/31/18  0340   *   CALCIUM 8.0*   *   K 4.3   CO2 20*      BUN 46*   CREATININE 3.1*     LFTs: No results for input(s): ALT, AST, ALKPHOS, BILITOT, PROT, ALBUMIN in the last 168 hours.  Coagulation: No results for input(s): LABPROT, INR, APTT in the last 168 hours.  Cardiac markers: No results for input(s): CKMB, CPKMB, TROPONINT, TROPONINI, MYOGLOBIN in the last 168 hours.  ABGs: No results for input(s): PH, PCO2, PO2, HCO3, POCSATURATED, BE in the last 168 hours.    Significant Diagnostics:         Patient underwent a sigmoid resection for perforated diverticulitis.  He has had a colostomy since his surgery. Patient has developed a parastomal hernia.     The patient recently had some issues with his colostomy.  He was seen in the office.     The patient was evaluated by Cardiology was found to have a moderate to high risk of complications from a cardiac point of view with colostomy reversal     An echocardiogram showed an ejection fraction of  30-35%.  But there was decreased ventricular function.     Patient now presents with his wife to discuss colostomy reversal

## 2018-10-31 NOTE — ASSESSMENT & PLAN NOTE
Continue antihypertensives, nephrology has been consulted and they will address these as they deem appropriate

## 2018-10-31 NOTE — PROGRESS NOTES
[facility] -   Critical Care Medicine  Progress Note    Patient Name: Yan Choudhury  MRN: [id number]  Admission Date: 10/30/2018  Hospital Length of Stay: 1 days  Code Status: Prior  Attending Provider: Kevin Lindsay MD  Primary Care Provider: Sandra Estevez MD   Principal Problem: Ischemic cardiomyopathy    Subjective:     HPI:  63 year old male with extensive PMH including CAD, CHF, ischemic cardiomyopathy with EF 30-35%; CKD3, DM2, HTN, HLD, obesity  Colonoscopy performed in preparation for parastomal hernia repair and reversal of colostomy; colonoscopy identified mass    Admitted this am for colon resection, colostomy reversal and repair of peristomal hernia        Hospital/ICU Course:  10/30 - Admitted to ICU after recovery from colon resection, ileostomy creation; hypotensive on low dose levophed to Rt IJ CL; rt radial arterial line in place; drowsy post anesthesia, appropriate when aroused, oriented x 3  10/31 - remains on pressor support with rising creatinine and marginal urine output overnight; hyperglycemic 222-302 overnight despite SSI    Review of Systems   Constitutional: Negative for fever.   HENT: Negative for sore throat.    Respiratory: Negative for shortness of breath.    Cardiovascular: Negative for chest pain.   Gastrointestinal: Positive for abdominal pain (appropriate post op). Negative for nausea.   Musculoskeletal: Positive for myalgias.   Neurological: Negative for focal weakness.   Psychiatric/Behavioral: The patient is not nervous/anxious.        Objective:     Vital Signs (Most Recent):  Temp: 99 °F (37.2 °C) (10/31/18 0305)  Pulse: 95 (10/31/18 0650)  Resp: 20 (10/31/18 0650)  BP: (!) 123/53 (10/31/18 0700)  SpO2: (S) 100 % (10/31/18 0810) Vital Signs (24h Range):  Temp:  [97.2 °F (36.2 °C)-99.6 °F (37.6 °C)] 99 °F (37.2 °C)  Pulse:  [] 95  Resp:  [12-30] 20  SpO2:  [92 %-100 %] 100 %  BP: ()/(38-99) 123/53     Weight: 96.4 kg (212 lb 8.4 oz)  Body mass index is  31.84 kg/m².      Intake/Output Summary (Last 24 hours) at 10/31/2018 0929  Last data filed at 10/31/2018 0600  Gross per 24 hour   Intake 4909.95 ml   Output 1040 ml   Net 3869.95 ml       Physical Exam   Constitutional: He is oriented to person, place, and time. He appears well-developed. No distress. He is not intubated. Nasal cannula in place.   HENT:   Head: Atraumatic.   Eyes: Conjunctivae are normal.   Glass rt eye   Neck: No JVD present. No tracheal deviation present.   Cardiovascular: Normal rate and regular rhythm.   No murmur heard.  Pulses:       Radial pulses are 1+ on the right side, and 1+ on the left side.        Dorsalis pedis pulses are 1+ on the right side, and 1+ on the left side.   Pulmonary/Chest: No accessory muscle usage. He is not intubated. No respiratory distress. He has decreased breath sounds. He has no wheezes. He has no rhonchi. He has no rales.   Abdominal: Soft. He exhibits no distension. Bowel sounds are decreased.       Musculoskeletal: He exhibits no edema.   Neurological: He is oriented to person, place, and time. GCS eye subscore is 3. GCS verbal subscore is 5. GCS motor subscore is 6.   Skin: Skin is warm and dry. Capillary refill takes less than 2 seconds.       Vents:  Oxygen Concentration (%): 30 (10/30/18 2314)    Lines/Drains/Airways     Central Venous Catheter Line                 Percutaneous Central Line Insertion/Assessment - triple lumen  10/30/18 0849 right internal jugular 1 day          Drain                 Urethral Catheter 10/30/18 0856 Straight-tip;Non-latex 16 Fr. 1 day         Closed/Suction Drain 10/30/18 1300 Abdomen Bulb less than 1 day         Colostomy 10/30/18 1340 RLQ less than 1 day          Arterial Line                 Arterial Line 10/30/18 0902 Right Radial 1 day          Peripheral Intravenous Line                 Peripheral IV - Single Lumen 10/30/18 0803 Right Hand 1 day                Significant Labs:    CBC/Anemia Profile:  Recent Labs   Lab  10/30/18  1530 10/31/18  0340   WBC 21.92* 30.85*   HGB 10.0* 9.8*   HCT 30.1* 29.4*    182   MCV 86 85   RDW 16.5* 16.3*        Chemistries:  Recent Labs   Lab 10/30/18  1530 10/31/18  0340    135*   K 4.0 4.3    104   CO2 22* 20*   BUN 38* 46*   CREATININE 2.5* 3.1*   CALCIUM 7.6* 8.0*   MG  --  1.0*       All pertinent labs within the past 24 hours have been reviewed.    Significant Imaging:  I have reviewed all pertinent imaging results/findings within the past 24 hours.      Assessment/Plan:     Cardiac/Vascular   * Ischemic cardiomyopathy    Post op hypotension responding well to levophed; wean as tolerated  ICU hemodynamic monitoring     Chronic combined systolic and diastolic congestive heart failure    Strict I/O  Recommend caution with IVF     Renal/   CKD (chronic kidney disease) stage 4, GFR 15-29 ml/min    Avoid nephrotoxins  Strict I/O  Monitor creatinine  Nephrology following     Endocrine   Obesity (BMI 30.0-34.9)    Recommend diet, lifestyle modifications for goal weight loss     Type 2 diabetes mellitus with stage 4 chronic kidney disease, with long-term current use of insulin    SSI with monitoring for glucose control and prevention of insulin toxicity     GI   S/P partial colectomy    With ileostomy placement   Surgery following  Diet per surgeon     Other   JAHAIRA on CPAP    Nocturnal CPAP        Critical Care Daily Checklist:    A: Awake: RASS Goal/Actual Goal:    Actual: Ordonez Agitation Sedation Scale (RASS): Alert and calm   B: Spontaneous Breathing Trial Performed?     C: SAT & SBT Coordinated?  n/a                      D: Delirium: CAM-ICU Overall CAM-ICU: Negative   E: Early Mobility Performed? Yes   F: Feeding Goal:    Status:     Current Diet Order   Procedures    Diet NPO Except for: Ice Chips, Sips with Medication     Order Specific Question:   Except for     Answer:   Ice Chips     Order Specific Question:   Except for     Answer:   Sips with Medication      AS:  Analgesia/Sedation prn   T: Thromboembolic Prophylaxis lovenox   H: HOB > 300 Yes   U: Stress Ulcer Prophylaxis (if needed) PPI   G: Glucose Control SSI prn   B: Bowel Function     I: Indwelling Catheter (Lines & Guzman) Necessity reviewed   D: De-escalation of Antimicrobials/Pharmacotherapies reviewed    Plan for the day/ETD Pressor support; ICU hemodynamic monitoring    Code Status:  Family/Goals of Care: Prior  Home on discharge   I have discussed case and plan of care in detail with Dr Plasencia and Dr Lindsay; Status and plan of care were discussed with team on multidisciplinary rounds.    Critical Care Time: 52 minutes  Critical secondary to requiring pressor support  Critical care was time spent personally by me on the following activities: development of treatment plan with patient or surrogate and bedside caregivers, discussions with consultants, evaluation of patient's response to treatment, examination of patient, ordering and performing treatments and interventions, ordering and review of laboratory studies, ordering and review of radiographic studies, pulse oximetry, re-evaluation of patient's condition. This critical care time did not overlap with that of any other provider or involve time for any procedures.     VASHTI Graham-BC  Critical Care Medicine  [facility]

## 2018-10-31 NOTE — PROGRESS NOTES
[facility] -   General Surgery  Progress Note    Subjective:     History of Present Illness:     Patient underwent a sigmoid resection for perforated diverticulitis.  He has had a colostomy since his surgery. Patient has developed a parastomal hernia.     The patient recently had some issues with his colostomy.  He was seen in the office.     The patient was evaluated by Cardiology was found to have a moderate to high risk of complications from a cardiac point of view with colostomy reversal     An echocardiogram showed an ejection fraction of 30-35%.  But there was decreased ventricular function.    Patient was also found to have a mass in the proximal transverse colon     He now presents for colostomy reversal, and resection of the colon mass           Post-Op Info:  Procedure(s) (LRB):  REVISION OR CLOSURE, COLOSTOMY (N/A)  COLECTOMY, PARTIAL (Right)  MOBILIZATION, SPLENIC FLEXURE (N/A)  CREATION, ILEOSTOMY (N/A)   1 Day Post-Op     Interval History:  Pain is controlled, requiring Levophed, marginal urine output, slightly worsening BUN and creatinine from the baseline    Fluid bolus, Nephrology consultation    Medications:  Continuous Infusions:   dextrose 5 % and 0.9 % NaCl      norepinephrine bitartrate-D5W 0.14 mcg/kg/min (10/31/18 0600)     Scheduled Meds:   acetaminophen  1,000 mg Oral Q8H    allopurinol  100 mg Oral Daily    aspirin  81 mg Oral QAM    bimatoprost  1 drop Both Eyes QHS    bumetanide  2 mg Oral QAM    carvedilol  25 mg Oral BID    chlorhexidine  10 mL Mouth/Throat BID    enoxaparin  30 mg Subcutaneous Daily    insulin detemir U-100  10 Units Subcutaneous Daily    metOLazone  2.5 mg Oral Every Mon, Wed, Fri    nozaseptin   Each Nare BID    pantoprazole  40 mg Oral QHS    simvastatin  10 mg Oral QHS    sodium chloride 0.9%  500 mL Intravenous Once    sodium chloride 0.9%  3 mL Intravenous Q8H     PRN Meds:dextrose 50%, fentaNYL, fentaNYL, glucagon (human  recombinant), insulin aspart U-100, metoclopramide HCl, metoclopramide HCl, morphine, ondansetron, oxyCODONE, sodium chloride 0.9%     Review of patient's allergies indicates:  No Known Allergies  Objective:     Vital Signs (Most Recent):  Temp: 99 °F (37.2 °C) (10/31/18 0305)  Pulse: 95 (10/31/18 0650)  Resp: 20 (10/31/18 0650)  BP: (!) 123/53 (10/31/18 0700)  SpO2: (S) 100 % (10/31/18 0810) Vital Signs (24h Range):  Temp:  [97.2 °F (36.2 °C)-99.6 °F (37.6 °C)] 99 °F (37.2 °C)  Pulse:  [] 95  Resp:  [12-30] 20  SpO2:  [92 %-100 %] 100 %  BP: ()/(38-99) 123/53     Weight: 96.4 kg (212 lb 8.4 oz)  Body mass index is 31.84 kg/m².    Intake/Output - Last 3 Shifts       10/29 0700 - 10/30 0659 10/30 0700 - 10/31 0659 10/31 0700 - 11/01 0659    P.O.  480     I.V. (mL/kg)  4430 (46)     Total Intake(mL/kg)  4910 (50.9)     Urine (mL/kg/hr)  455 (0.2)     Drains  260     Blood  325     Total Output  1040     Net  +3870                  Physical Exam   Constitutional: No distress.   Slightly chronically ill   HENT:   Head: Normocephalic and atraumatic.   Neck: Normal range of motion. Neck supple.   Cardiovascular: Normal rate and regular rhythm.   Pulmonary/Chest: Effort normal and breath sounds normal.   Abdominal: Soft. Bowel sounds are normal. He exhibits no distension. Tenderness: Mild.   Midline incision is clean.  The left abdominal colostomy site is clean.  There is a right upper quadrant ileostomy with a edematous but pink small-bowel   Genitourinary:   Genitourinary Comments: Guzman in place   Neurological: He is alert.   Skin: Skin is warm and dry.   Vitals reviewed.      Significant Labs:  CBC:   Recent Labs   Lab 10/31/18  0340   WBC 30.85*   RBC 3.46*   HGB 9.8*   HCT 29.4*      MCV 85   MCH 28.3   MCHC 33.3     BMP:   Recent Labs   Lab 10/31/18  0340   *   *   K 4.3      CO2 20*   BUN 46*   CREATININE 3.1*   CALCIUM 8.0*   MG 1.0*     CMP:   Recent Labs   Lab 10/31/18  0340    *   CALCIUM 8.0*   *   K 4.3   CO2 20*      BUN 46*   CREATININE 3.1*     LFTs: No results for input(s): ALT, AST, ALKPHOS, BILITOT, PROT, ALBUMIN in the last 168 hours.  Coagulation: No results for input(s): LABPROT, INR, APTT in the last 168 hours.  Cardiac markers: No results for input(s): CKMB, CPKMB, TROPONINT, TROPONINI, MYOGLOBIN in the last 168 hours.  ABGs: No results for input(s): PH, PCO2, PO2, HCO3, POCSATURATED, BE in the last 168 hours.    Significant Diagnostics:         Patient underwent a sigmoid resection for perforated diverticulitis.  He has had a colostomy since his surgery. Patient has developed a parastomal hernia.     The patient recently had some issues with his colostomy.  He was seen in the office.     The patient was evaluated by Cardiology was found to have a moderate to high risk of complications from a cardiac point of view with colostomy reversal     An echocardiogram showed an ejection fraction of 30-35%.  But there was decreased ventricular function.     Patient now presents with his wife to discuss colostomy reversal           Assessment/Plan:     * Ischemic cardiomyopathy    Continue Levophed, weakness tolerated  Measures central venous pressure  Cardiology consultation per Critical Care if they feel it is needed     History of colostomy reversal    Colostomy has been reversed, air leak on proctoscopy, protective ileostomy in place     Parastomal hernia without obstruction or gangrene    Parastomal hernia was closed at the time of colostomy reversal.  Local wound care.     JAHAIRA on CPAP    CPAP per Pulmonary Medicine     Colostomy in place    Colostomy has now been reversed     S/P partial colectomy    Postop day 1 from a right hemicolectomy, colostomy reversal protective ileostomy.  Monitor ileostomy.     Biventricular ICD (implantable cardioverter-defibrillator) in place    Monitor with telemetry     CAD, multiple vessel    Continue to monitor     CKD (chronic  kidney disease) stage 4, GFR 15-29 ml/min    Nephrology consultation continue to monitor     LBBB (left bundle branch block)    Monitor with telemetry     Hyperlipidemia associated with type 2 diabetes mellitus    Continue home medications for hyperlipidemia.  Insulin sliding scale for diabetes     Pulmonary HTN    Monitor     Hypertension associated with diabetes    Continue antihypertensives, nephrology has been consulted and they will address these as they deem appropriate         Kevin Lindsay MD  General Surgery  [facility]

## 2018-10-31 NOTE — ASSESSMENT & PLAN NOTE
Postop day 1 from a right hemicolectomy, colostomy reversal protective ileostomy.  Monitor ileostomy.

## 2018-10-31 NOTE — PLAN OF CARE
Problem: Patient Care Overview  Goal: Plan of Care Review  Outcome: Ongoing (interventions implemented as appropriate)  No acute changes over night, pt remains AAOx4, VSS, on levophed gtt, UOP oliguric w/ increase in creatinine this AM, and a TMAX of 99.6. Pt on 2 lpm NC and CPAP at HS and tolerated both well w/ O2 sats %. New stoma pink/red, moist, protruding past skin level and has scant amount of serosanguinous drainage noted. Pain controlled w/ PRN meds. KINDRA drain output 120 ml over 12 hrs. Pt turned q2 hrs w/ heels floated. POC discussed w/ pt.

## 2018-10-31 NOTE — CONSULTS
"Consulted on this 64 y/o M patient due to reversal of colostomy and placement of loop ileostomy. Patient currently in ICU, resting quietly with eyes closed. He arouses to verbal stimulation and responds appropriately. He appears ill.  Abdomen assessed at this time. RUQ Ileostomy noted with moist pink congested stoma, small amount serosanguinous effluent, and intact one piece pouch with no leak noted. Surgical dressing noted to left abdomen not disturbed at this time.  Patient states he had a colostomy since 2016.  He has been independently in colostomy care since that time.  Will plan to visit patient tomorrow for continued Ileostomy education.  Extra pouches and rings left at bedside.      Please review [place]'s procedure "Pouching : Colostomy or Ileostomy" for instructions on ostomy.stoma care. Change ostomy appliance weekly or IMMEDIATELY IF LEAKING. All ostomy care supplies can be requested from materials management.    OSTOMY CARE SUPPLIES:  One Piece Pouch #[id number]    Brava Stoma Ring #[id number]    Cavilon Spray #[id number]           "

## 2018-10-31 NOTE — HOSPITAL COURSE
Postop day 1.  Patient underwent a right hemicolectomy, colostomy reversal and protective ileostomy due to an air leak on proctoscopy.  He reports little to no pain. He has marginal urine output, there is acute on chronic renal failure.  He is requiring norepinephrine for blood pressure support.    POD 2 :  Off levophed, wbc improved, urine output improved, amenia - blood loss and chronic disease    POD3: pain controlled. Stoma pink. No flatus. No nausea. Tolerated sips and ice chips    Pod 4:  Distended, minimal ostomy output, pain controlled, working with physical therapy    Pod 5:  Distended, increased ostomy output, ambulating with physical therapy    POD 6:  Guzman removed, ileostomy output, distention improve, no nausea, would like to go home with home services    Postop day 7.  Liquid ileostomy output.  Some nausea yesterday.  No labs in a few days.  Wound VAC form was faxed yesterday    POD 8 no nausea, 2 liters from the ileostomy, renal function is improved    POD 9 Tolerated a regular diet, less ileostomy output, becoming firm

## 2018-10-31 NOTE — PLAN OF CARE
Assessment completed.  Met with the patient/ family. CM explained and left info in blue transition of care folder regarding Advance Directives, Living Will ( FULL CODE ) , Pamphlet on D/C planning on admission and Pharmacy bedside delivery.  The role of CM explained for ICU transitions of care/ discharge planning. Per EMR,  PMH including CAD, CHF, ischemic cardiomyopathy with EF 30-35%; CKD3, DM2, HTN, HLD, obesity. Colonoscopy performed in preparation for parastomal hernia repair and reversal of colostomy; colonoscopy identified mass   Admitted this am for colon resection, colostomy reversal and repair of peristomal hernia. Patient has family support of his  spouse , kids.  Patient has Medicare insurance.  Patient has no needs at this time. CM to f/u for safe transition      Sandra Estevez MD       [facility] [address] [place] [address]  Phone: [phone] Fax: [phone]       10/31/18 1229   Discharge Assessment   Assessment Type Discharge Planning Assessment   Confirmed/corrected address and phone number on facesheet? Yes   Assessment information obtained from? Patient;Caregiver;Medical Record   Expected Length of Stay (days) (TBD)   Communicated expected length of stay with patient/caregiver no   Prior to hospitilization cognitive status: Alert/Oriented   Prior to hospitalization functional status: Independent   Current cognitive status: Alert/Oriented   Current Functional Status: Independent   Lives With spouse;child(patrica), adult   Able to Return to Prior Arrangements yes   Is patient able to care for self after discharge? Yes   Patient's perception of discharge disposition home or selfcare   Readmission Within The Last 30 Days no previous admission in last 30 days   Patient currently being followed by outpatient case management? No   Patient currently receives any other outside agency services? No   Equipment Currently Used at Home cane, straight;glucometer   Do you  have any problems affording any of your prescribed medications? No   Is the patient taking medications as prescribed? yes   Transportation Available family or friend will provide   Does the patient receive services at the [facility]? No   Discharge Plan A Home with family   Discharge Plan B Home with family   Patient/Family In Agreement With Plan yes

## 2018-11-01 PROBLEM — G47.34 NOCTURNAL HYPOXEMIA: Status: ACTIVE | Noted: 2018-11-01

## 2018-11-01 PROBLEM — S31.109A OPEN WOUND ANTERIOR ABDOMINAL WALL: Status: ACTIVE | Noted: 2018-11-01

## 2018-11-01 LAB
ABO GROUP BLD: NORMAL
ALBUMIN SERPL BCP-MCNC: 2.6 G/DL
ALP SERPL-CCNC: 37 U/L
ALT SERPL W/O P-5'-P-CCNC: 9 U/L
ANION GAP SERPL CALC-SCNC: 8 MMOL/L
ANION GAP SERPL CALC-SCNC: 8 MMOL/L
ANISOCYTOSIS BLD QL SMEAR: SLIGHT
AST SERPL-CCNC: 24 U/L
BASO STIPL BLD QL SMEAR: ABNORMAL
BASOPHILS # BLD AUTO: 0 K/UL
BASOPHILS # BLD AUTO: 0.01 K/UL
BASOPHILS NFR BLD: 0 %
BASOPHILS NFR BLD: 0.1 %
BILIRUB SERPL-MCNC: 0.6 MG/DL
BLD GP AB SCN CELLS X3 SERPL QL: NORMAL
BLOOD GROUP ANTIBODIES SERPL: NORMAL
BUN SERPL-MCNC: 52 MG/DL
BUN SERPL-MCNC: 52 MG/DL
CALCIUM SERPL-MCNC: 7.9 MG/DL
CALCIUM SERPL-MCNC: 7.9 MG/DL
CHLORIDE SERPL-SCNC: 106 MMOL/L
CHLORIDE SERPL-SCNC: 106 MMOL/L
CO2 SERPL-SCNC: 22 MMOL/L
CO2 SERPL-SCNC: 22 MMOL/L
CREAT SERPL-MCNC: 3.2 MG/DL
CREAT SERPL-MCNC: 3.2 MG/DL
DACRYOCYTES BLD QL SMEAR: ABNORMAL
DIFFERENTIAL METHOD: ABNORMAL
DIFFERENTIAL METHOD: ABNORMAL
EOSINOPHIL # BLD AUTO: 0 K/UL
EOSINOPHIL # BLD AUTO: 0 K/UL
EOSINOPHIL NFR BLD: 0 %
EOSINOPHIL NFR BLD: 0 %
ERYTHROCYTE [DISTWIDTH] IN BLOOD BY AUTOMATED COUNT: 16.5 %
ERYTHROCYTE [DISTWIDTH] IN BLOOD BY AUTOMATED COUNT: 16.6 %
EST. GFR  (AFRICAN AMERICAN): 22 ML/MIN/1.73 M^2
EST. GFR  (AFRICAN AMERICAN): 22 ML/MIN/1.73 M^2
EST. GFR  (NON AFRICAN AMERICAN): 19 ML/MIN/1.73 M^2
EST. GFR  (NON AFRICAN AMERICAN): 19 ML/MIN/1.73 M^2
GLUCOSE SERPL-MCNC: 184 MG/DL
GLUCOSE SERPL-MCNC: 184 MG/DL
HCT VFR BLD AUTO: 20.3 %
HCT VFR BLD AUTO: 20.4 %
HGB BLD-MCNC: 6.6 G/DL
HGB BLD-MCNC: 6.9 G/DL
HYPOCHROMIA BLD QL SMEAR: ABNORMAL
LYMPHOCYTES # BLD AUTO: 1 K/UL
LYMPHOCYTES # BLD AUTO: 1.3 K/UL
LYMPHOCYTES NFR BLD: 7.1 %
LYMPHOCYTES NFR BLD: 8.9 %
MAGNESIUM SERPL-MCNC: 1.6 MG/DL
MCH RBC QN AUTO: 27.4 PG
MCH RBC QN AUTO: 28.8 PG
MCHC RBC AUTO-ENTMCNC: 32.5 G/DL
MCHC RBC AUTO-ENTMCNC: 33.8 G/DL
MCV RBC AUTO: 84 FL
MCV RBC AUTO: 85 FL
MONOCYTES # BLD AUTO: 1 K/UL
MONOCYTES # BLD AUTO: 1.2 K/UL
MONOCYTES NFR BLD: 7 %
MONOCYTES NFR BLD: 8.3 %
NEUTROPHILS # BLD AUTO: 12.4 K/UL
NEUTROPHILS # BLD AUTO: 12.5 K/UL
NEUTROPHILS NFR BLD: 84 %
NEUTROPHILS NFR BLD: 84.9 %
OVALOCYTES BLD QL SMEAR: ABNORMAL
PHOSPHATE SERPL-MCNC: 3.1 MG/DL
PLATELET # BLD AUTO: 106 K/UL
PLATELET # BLD AUTO: 86 K/UL
PLATELET BLD QL SMEAR: ABNORMAL
PMV BLD AUTO: 11.6 FL
PMV BLD AUTO: ABNORMAL FL
POCT GLUCOSE: 127 MG/DL (ref 70–110)
POCT GLUCOSE: 130 MG/DL (ref 70–110)
POCT GLUCOSE: 159 MG/DL (ref 70–110)
POCT GLUCOSE: 160 MG/DL (ref 70–110)
POIKILOCYTOSIS BLD QL SMEAR: SLIGHT
POLYCHROMASIA BLD QL SMEAR: ABNORMAL
POTASSIUM SERPL-SCNC: 3.9 MMOL/L
POTASSIUM SERPL-SCNC: 3.9 MMOL/L
PROT SERPL-MCNC: 5.4 G/DL
RBC # BLD AUTO: 2.4 M/UL
RBC # BLD AUTO: 2.41 M/UL
RH BLD: NORMAL
SCHISTOCYTES BLD QL SMEAR: PRESENT
SODIUM SERPL-SCNC: 136 MMOL/L
SODIUM SERPL-SCNC: 136 MMOL/L
TARGETS BLD QL SMEAR: ABNORMAL
WBC # BLD AUTO: 14.64 K/UL
WBC # BLD AUTO: 14.82 K/UL

## 2018-11-01 PROCEDURE — A4216 STERILE WATER/SALINE, 10 ML: HCPCS | Performed by: ANESTHESIOLOGY

## 2018-11-01 PROCEDURE — 27000221 HC OXYGEN, UP TO 24 HOURS

## 2018-11-01 PROCEDURE — 97530 THERAPEUTIC ACTIVITIES: CPT

## 2018-11-01 PROCEDURE — S5571 INSULIN DISPOS PEN 3 ML: HCPCS | Performed by: NURSE PRACTITIONER

## 2018-11-01 PROCEDURE — 94660 CPAP INITIATION&MGMT: CPT

## 2018-11-01 PROCEDURE — 86901 BLOOD TYPING SEROLOGIC RH(D): CPT

## 2018-11-01 PROCEDURE — G8988 SELF CARE GOAL STATUS: HCPCS | Mod: CK

## 2018-11-01 PROCEDURE — 25000003 PHARM REV CODE 250: Performed by: ANESTHESIOLOGY

## 2018-11-01 PROCEDURE — 99291 CRITICAL CARE FIRST HOUR: CPT | Mod: ,,, | Performed by: INTERNAL MEDICINE

## 2018-11-01 PROCEDURE — 83735 ASSAY OF MAGNESIUM: CPT

## 2018-11-01 PROCEDURE — 86870 RBC ANTIBODY IDENTIFICATION: CPT

## 2018-11-01 PROCEDURE — G8978 MOBILITY CURRENT STATUS: HCPCS | Mod: CL

## 2018-11-01 PROCEDURE — G8987 SELF CARE CURRENT STATUS: HCPCS | Mod: CM

## 2018-11-01 PROCEDURE — 86850 RBC ANTIBODY SCREEN: CPT

## 2018-11-01 PROCEDURE — 85025 COMPLETE CBC W/AUTO DIFF WBC: CPT | Mod: 91

## 2018-11-01 PROCEDURE — 94799 UNLISTED PULMONARY SVC/PX: CPT

## 2018-11-01 PROCEDURE — 80053 COMPREHEN METABOLIC PANEL: CPT

## 2018-11-01 PROCEDURE — 25000003 PHARM REV CODE 250: Performed by: SURGERY

## 2018-11-01 PROCEDURE — 84100 ASSAY OF PHOSPHORUS: CPT

## 2018-11-01 PROCEDURE — 97167 OT EVAL HIGH COMPLEX 60 MIN: CPT

## 2018-11-01 PROCEDURE — 25000003 PHARM REV CODE 250: Performed by: INTERNAL MEDICINE

## 2018-11-01 PROCEDURE — 99900035 HC TECH TIME PER 15 MIN (STAT)

## 2018-11-01 PROCEDURE — G8979 MOBILITY GOAL STATUS: HCPCS | Mod: CJ

## 2018-11-01 PROCEDURE — 97162 PT EVAL MOD COMPLEX 30 MIN: CPT

## 2018-11-01 PROCEDURE — 99233 SBSQ HOSP IP/OBS HIGH 50: CPT | Mod: ,,, | Performed by: INTERNAL MEDICINE

## 2018-11-01 PROCEDURE — 63600175 PHARM REV CODE 636 W HCPCS: Performed by: NURSE PRACTITIONER

## 2018-11-01 PROCEDURE — 63600175 PHARM REV CODE 636 W HCPCS: Performed by: SURGERY

## 2018-11-01 PROCEDURE — 21400001 HC TELEMETRY ROOM

## 2018-11-01 RX ORDER — HYDROCODONE BITARTRATE AND ACETAMINOPHEN 500; 5 MG/1; MG/1
TABLET ORAL
Status: DISCONTINUED | OUTPATIENT
Start: 2018-11-01 | End: 2018-11-05

## 2018-11-01 RX ORDER — HYDROMORPHONE HYDROCHLORIDE 1 MG/ML
0.5 INJECTION, SOLUTION INTRAMUSCULAR; INTRAVENOUS; SUBCUTANEOUS
Status: DISCONTINUED | OUTPATIENT
Start: 2018-11-01 | End: 2018-11-08 | Stop reason: HOSPADM

## 2018-11-01 RX ORDER — FUROSEMIDE 10 MG/ML
60 INJECTION INTRAMUSCULAR; INTRAVENOUS ONCE
Status: COMPLETED | OUTPATIENT
Start: 2018-11-01 | End: 2018-11-02

## 2018-11-01 RX ORDER — DIPHENHYDRAMINE HCL 25 MG
25 CAPSULE ORAL
Status: DISCONTINUED | OUTPATIENT
Start: 2018-11-01 | End: 2018-11-08 | Stop reason: HOSPADM

## 2018-11-01 RX ORDER — SODIUM CHLORIDE 0.9 % (FLUSH) 0.9 %
5 SYRINGE (ML) INJECTION
Status: DISCONTINUED | OUTPATIENT
Start: 2018-11-01 | End: 2018-11-05 | Stop reason: SDUPTHER

## 2018-11-01 RX ORDER — HYDROMORPHONE HYDROCHLORIDE 1 MG/ML
1 INJECTION, SOLUTION INTRAMUSCULAR; INTRAVENOUS; SUBCUTANEOUS EVERY 6 HOURS PRN
Status: DISCONTINUED | OUTPATIENT
Start: 2018-11-01 | End: 2018-11-08 | Stop reason: HOSPADM

## 2018-11-01 RX ADMIN — ACETAMINOPHEN 1000 MG: 500 TABLET, FILM COATED ORAL at 05:11

## 2018-11-01 RX ADMIN — INSULIN ASPART 2 UNITS: 100 INJECTION, SOLUTION INTRAVENOUS; SUBCUTANEOUS at 05:11

## 2018-11-01 RX ADMIN — DEXTROSE AND SODIUM CHLORIDE: 5; .9 INJECTION, SOLUTION INTRAVENOUS at 05:11

## 2018-11-01 RX ADMIN — CARVEDILOL 12.5 MG: 12.5 TABLET, FILM COATED ORAL at 09:11

## 2018-11-01 RX ADMIN — SIMVASTATIN 10 MG: 5 TABLET, FILM COATED ORAL at 10:11

## 2018-11-01 RX ADMIN — CHLORHEXIDINE GLUCONATE 10 ML: 1.2 RINSE ORAL at 10:11

## 2018-11-01 RX ADMIN — INSULIN DETEMIR 10 UNITS: 100 INJECTION, SOLUTION SUBCUTANEOUS at 09:11

## 2018-11-01 RX ADMIN — ENOXAPARIN SODIUM 30 MG: 100 INJECTION SUBCUTANEOUS at 05:11

## 2018-11-01 RX ADMIN — CHLORHEXIDINE GLUCONATE 10 ML: 1.2 RINSE ORAL at 09:11

## 2018-11-01 RX ADMIN — Medication 3 ML: at 05:11

## 2018-11-01 RX ADMIN — PANTOPRAZOLE SODIUM 40 MG: 40 TABLET, DELAYED RELEASE ORAL at 10:11

## 2018-11-01 RX ADMIN — CARVEDILOL 12.5 MG: 12.5 TABLET, FILM COATED ORAL at 10:11

## 2018-11-01 RX ADMIN — Medication 3 ML: at 10:11

## 2018-11-01 RX ADMIN — BIMATOPROST 1 DROP: 0.1 SOLUTION/ DROPS OPHTHALMIC at 10:11

## 2018-11-01 RX ADMIN — ALLOPURINOL 100 MG: 100 TABLET ORAL at 09:11

## 2018-11-01 RX ADMIN — ACETAMINOPHEN 1000 MG: 500 TABLET, FILM COATED ORAL at 10:11

## 2018-11-01 RX ADMIN — INSULIN ASPART 2 UNITS: 100 INJECTION, SOLUTION INTRAVENOUS; SUBCUTANEOUS at 12:11

## 2018-11-01 NOTE — NURSING
PT TRANSFERRED TO ROOM 248 VIA WHEELCHAIR. WIFE NOTIFIED OF PT'S NEW LOCATION.  ALL BELONGINGS BROUGHT WITH PT.  PT ON WOUND VAC (PATENT).  HICKMAN CATHETER PATENT AND DRAINING.  BEDSIDE REPORT GIVEN.  ALL MEDICATIONS AND CHART GIVEN TO TELEMETRY NURSE.  PT PLACED IN BED WITH CALL MUSE IN REACH.  ON 2L NC.  BED LOCKED AND LOW.  PT DENIES NEEDS.

## 2018-11-01 NOTE — PROGRESS NOTES
[facility] -   General Surgery  Progress Note    Subjective:     History of Present Illness:     Patient underwent a sigmoid resection for perforated diverticulitis.  He has had a colostomy since his surgery. Patient has developed a parastomal hernia.     The patient recently had some issues with his colostomy.  He was seen in the office.     The patient was evaluated by Cardiology was found to have a moderate to high risk of complications from a cardiac point of view with colostomy reversal     An echocardiogram showed an ejection fraction of 30-35%.  But there was decreased ventricular function.    Patient was also found to have a mass in the proximal transverse colon     He now presents for colostomy reversal, and resection of the colon mass           Post-Op Info:  Procedure(s) (LRB):  REVISION OR CLOSURE, COLOSTOMY (N/A)  HEMICOLECTOMY, RIGHT (Right)  MOBILIZATION, SPLENIC FLEXURE (N/A)  CREATION, ILEOSTOMY (N/A)   2 Days Post-Op     Interval History: off levophed, anemia, mild pain  Transfer to floor    Medications:  Continuous Infusions:   dextrose 5 % and 0.9 % NaCl 100 mL/hr at 11/01/18 0800    norepinephrine bitartrate-D5W Stopped (10/31/18 1828)     Scheduled Meds:   acetaminophen  1,000 mg Oral Q8H    allopurinol  100 mg Oral Daily    bimatoprost  1 drop Both Eyes QHS    carvedilol  12.5 mg Oral BID    chlorhexidine  10 mL Mouth/Throat BID    enoxaparin  30 mg Subcutaneous Daily    insulin detemir U-100  10 Units Subcutaneous Daily    nozaseptin   Each Nare BID    pantoprazole  40 mg Oral QHS    simvastatin  10 mg Oral QHS    sodium chloride 0.9%  3 mL Intravenous Q8H     PRN Meds:sodium chloride, dextrose 50%, diphenhydrAMINE, glucagon (human recombinant), HYDROmorphone, HYDROmorphone, insulin aspart U-100, metoclopramide HCl, metoclopramide HCl, morphine, ondansetron, oxyCODONE, sodium chloride 0.9%, sodium chloride 0.9%     Review of patient's allergies indicates:  No Known  Allergies  Objective:     Vital Signs (Most Recent):  Temp: 98.4 °F (36.9 °C) (11/01/18 0705)  Pulse: 94 (11/01/18 0800)  Resp: 16 (11/01/18 0800)  BP: (!) 106/58 (11/01/18 0800)  SpO2: 97 % (11/01/18 0800) Vital Signs (24h Range):  Temp:  [98.2 °F (36.8 °C)-99.2 °F (37.3 °C)] 98.4 °F (36.9 °C)  Pulse:  [] 94  Resp:  [12-29] 16  SpO2:  [93 %-100 %] 97 %  BP: ()/(42-76) 106/58     Weight: 93.8 kg (206 lb 12.7 oz)  Body mass index is 30.99 kg/m².    Intake/Output - Last 3 Shifts       10/30 0700 - 10/31 0659 10/31 0700 - 11/01 0659 11/01 0700 - 11/02 0659    P.O. 480 180     I.V. (mL/kg) 4430 (46) 2134.7 (22.8)     Blood  500     IV Piggyback  500     Total Intake(mL/kg) 4910 (50.9) 3314.7 (35.3)     Urine (mL/kg/hr) 455 (0.2) 930 (0.4) 180 (1)    Drains 260 135     Blood 325      Total Output 1040 1065 180    Net +3870 +2249.7 -180                 Physical Exam   Constitutional: He is oriented to person, place, and time. No distress.   Chronically ill   HENT:   Head: Normocephalic and atraumatic.   Eyes: EOM are normal. Pupils are equal, round, and reactive to light. No scleral icterus.   Neck: Normal range of motion.   Cardiovascular: Normal rate, regular rhythm and normal heart sounds.   Pulmonary/Chest: Effort normal and breath sounds normal.   Abdominal: Soft. Bowel sounds are normal. He exhibits no distension. There is tenderness.   Wound dressed. serosanguinous drainage, at old colosotmy site as expected   Musculoskeletal: Normal range of motion.   Neurological: He is alert and oriented to person, place, and time.   Skin: Skin is warm and dry. Capillary refill takes less than 2 seconds.   Psychiatric: He has a normal mood and affect. His behavior is normal. Judgment and thought content normal.   Vitals reviewed.      Significant Labs:  CBC:   Recent Labs   Lab 11/01/18  0525   WBC 14.82*   RBC 2.40*   HGB 6.9*   HCT 20.4*   *   MCV 85   MCH 28.8   MCHC 33.8     BMP:   Recent Labs   Lab  11/01/18  0350   *  184*     136   K 3.9  3.9     106   CO2 22*  22*   BUN 52*  52*   CREATININE 3.2*  3.2*   CALCIUM 7.9*  7.9*   MG 1.6     CMP:   Recent Labs   Lab 11/01/18  0350   *  184*   CALCIUM 7.9*  7.9*   ALBUMIN 2.6*   PROT 5.4*     136   K 3.9  3.9   CO2 22*  22*     106   BUN 52*  52*   CREATININE 3.2*  3.2*   ALKPHOS 37*   ALT 9*   AST 24   BILITOT 0.6     Coagulation: No results for input(s): LABPROT, INR, APTT in the last 168 hours.    Significant Diagnostics:  none    Assessment/Plan:     * Ischemic cardiomyopathy    off Levophed, weakness tolerated  Measures central venous pressure  Cardiology consultation per Critical Care if they feel it is needed  Transfuse prbc       BROOKE (acute kidney injury)    Acute on chronic  Nephology has been consulted     History of colostomy reversal    Colostomy has been reversed, air leak on proctoscopy, protective ileostomy in place     Parastomal hernia without obstruction or gangrene    Parastomal hernia was closed at the time of colostomy reversal.  Local wound care.     Chronic combined systolic and diastolic congestive heart failure    Monitor  Transfuse prbc     JAHAIRA on CPAP    CPAP per Pulmonary Medicine     Colostomy in place    Colostomy has now been reversed  Wound care to colostomy site     Anemia    Combination of acute blood loss and chronic disease and dilutional    Will transfuse 2 prbc and check labs    Risks of blood discussed, hepatitis and hiv, very rare, transfusion reaction     S/P partial colectomy    Postop day 2 from a right hemicolectomy, colostomy reversal protective ileostomy.  Monitor ileostomy.     Biventricular ICD (implantable cardioverter-defibrillator) in place    Monitor with telemetry     CAD, multiple vessel    Continue to monitor     CKD (chronic kidney disease) stage 4, GFR 15-29 ml/min    Nephrology consultation continue to monitor     LBBB (left bundle branch block)     Monitor with telemetry     Hyperlipidemia associated with type 2 diabetes mellitus    Continue home medications for hyperlipidemia.  Insulin sliding scale for diabetes     Pulmonary HTN    Monitor     Hypertension associated with diabetes    Continue antihypertensives, nephrology has been consulted and they will address these as they deem appropriate         Kevin Lindsay MD  General Surgery  [facility]

## 2018-11-01 NOTE — PT/OT/SLP EVAL
Physical Therapy Evaluation    Patient Name:  Yan Choudhury   MRN:  [id number]    Recommendations:     Discharge Recommendations:  SNF, home health PT(DEPENDING ON PROGRESS WITH POC)   Discharge Equipment Recommendations: bath bench, walker, rolling(TO BE ASSESSED FURTHER WITH PROGRESS)   Barriers to discharge: None, PT HAS GOOD FAMILY SUPPORT    Assessment:     Yan Choudhury is a 63 y.o. male admitted with a medical diagnosis of Ischemic cardiomyopathy.  He presents with the following impairments/functional limitations:  weakness, impaired endurance, impaired functional mobilty, gait instability, impaired balance, decreased coordination, decreased safety awareness.    Rehab Prognosis:  GOOD; patient would benefit from acute skilled PT services to address these deficits and reach maximum level of function.      Recent Surgery: Procedure(s) (LRB):  REVISION OR CLOSURE, COLOSTOMY (N/A)  HEMICOLECTOMY, RIGHT (Right)  MOBILIZATION, SPLENIC FLEXURE (N/A)  CREATION, ILEOSTOMY (N/A) 2 Days Post-Op    Plan:     During this hospitalization, patient to be seen 5 x/week to address the above listed problems via gait training, therapeutic activities, therapeutic exercises  · Plan of Care Expires:  11/08/18   Plan of Care Reviewed with: patient    Subjective     Communicated with NURSE MARK prior to session.  Patient found SUPINE upon PT entry to room, agreeable to evaluation.      Chief Complaint:   Patient comments/goals:   Pain/Comfort:  · Pain Rating 1: 0/10    Patients cultural, spiritual, Quaker conflicts given the current situation:      Living Environment:  PT LIVES WITH WIFE AND SON 1 STORY HOUSE NO STEPS, FAMILY ABLE TO ASSIST PT AND PROVIDE 24 HOUR CARE, PT WAS AND INDEP COMMUNITY AMBULATOR PTA, STILL DRIVES, RETIRED, INDEP WITH ADL'S  Prior to admission, patients level of function was INDEP.  Patient has the following equipment: none.  DME owned (not currently used): none.  Upon discharge, patient will have  assistance from FAMILY.    Objective:     Patient found with: blood pressure cuff, peripheral IV, oxygen, KINDRA drain, pulse ox (continuous), telemetry, wayne catheter     General Precautions: Standard, fall   Orthopedic Precautions:N/A   Braces: N/A     Exams:  · Cognitive Exam:  Patient is oriented to Person, Place, Time, Situation and LETHARGIC BUT ABLE TO PARTICIPATE, CUES TO STAY ON TASK, PT WITH LOW H/H, NURSE AWARE  · Postural Exam:  Patient presented with the following abnormalities:    · -       Rounded shoulders  · -       Forward head  · Sensation:    · -       Intact  · RLE ROM: WFL  · RLE Strength: GROSSLY 4-/5  · LLE ROM: WFL  · LLE Strength: GROSSLY 4-/5    Functional Mobility:  · Bed Mobility:     · Rolling Left:  minimum assistance  · Scooting: minimum assistance  · Supine to Sit: minimum assistance  · Transfers:     · Sit to Stand:  moderate assistance with no AD  · Bed to Chair: moderate assistance with  no AD  using  Stand Pivot  · Gait: ATTEMPTED GAIT BUT PT VERY UNSTABLE WITH FWD SWAY WITH FWD LEAN, VERBAL AND TACTILE CUES FOR UPRIGHT POSTURE  · Balance: POOR    AM-PAC 6 CLICK MOBILITY  Total Score:14     Therapeutic Activities and Exercises:  PT EDUCATED IN ROLE OF P.T. AND POC, PT SLOW AND LETHARGIC SO CUES TO STAY ON TASK, PT TF TO CHAIR WITH MODA, PT EDUCATED IN BLE THEREX TO PERFORM WHILE SEATED IN CHAIR    Patient left up in chair with all lines intact, call button in reach and NURSE notified.    GOALS:   Multidisciplinary Problems     Physical Therapy Goals        Problem: Physical Therapy Goal    Goal Priority Disciplines Outcome Goal Variances Interventions   Physical Therapy Goal     PT, PT/OT      Description:  LTG'S TO BE MET IN 7 DAYS (11-8-18)  1. PT WILL REQUIRE SBA FOR BED MOBILITY  2. PT WILL REQUIRE NATI FOR TF'S  3. PT WILL ' WITH RW AND NATI  4. PT WILL DEMO F DYNAMIC BALANCE DURING GAIT                    History:     Past Medical History:   Diagnosis Date    Arthritis      CAD (coronary artery disease)     Cataract     CHF (congestive heart failure)     Dr Calvillo    Chronic combined systolic and diastolic congestive heart failure 3/24/2015    CKD (chronic kidney disease), stage III     Diabetes mellitus type II      AM    Diabetic retinopathy     anti Veg F injections for macular edema    Diverticulitis of colon     ED (erectile dysfunction)     Glaucoma     HTN (hypertension)     Hyperlipidemia     Ischemic cardiomyopathy     Obesity     JAHAIRA on CPAP     Pacemaker     Pulmonary HTN        Past Surgical History:   Procedure Laterality Date    CARDIAC CATHETERIZATION  2009    CHOLECYSTECTOMY      COLECTOMY-SIGMOID N/A 4/22/2016    Per[facility] Kevin Lindsay [facility] at [facility] OR    COLONOSCOPY N/A 10/11/2018    Procedure: COLONOSCOPY;  Surgeon: Quinn Aragon MD;  Location: [facility] ENDO;  Service: General;  Laterality: N/A;    COLONOSCOPY N/A 10/11/2018    Performed by Quinn Aragon MD at [facility] ENDO    COLOSTOMY N/A 4/22/2016    Performed by Kevin [facility] at [facility] OR    CREATION, ILEOSTOMY N/A 10/30/2018    Performed by Kevin [facility] at [facility] OR    EYE SURGERY      HEMICOLECTOMY, RIGHT Right 10/30/2018    Performed by Kevin Lindsay MD at [facility] OR    ILEOSTOMY N/A 10/30/2018    Procedure: CREATION, ILEOSTOMY;  Surgeon: Kevin Lindsay MD;  Location: [facility] OR;  Service: General;  Laterality: N/A;    KNEE SURGERY      MOBILIZATION OF SPLENIC FLEXURE N/A 10/30/2018    Procedure: MOBILIZATION, SPLENIC FLEXURE;  Surgeon: Kevin Lindsay MD;  Location: [facility] OR;  Service: General;  Laterality: N/A;    MOBILIZATION, SPLENIC FLEXURE N/A 10/30/2018    Performed by Kevin Lindsay MD at [facility] OR    REVISION COLOSTOMY N/A 10/30/2018    Procedure: REVISION OR CLOSURE, COLOSTOMY;  Surgeon: Kevin Lindsay MD;  Location: [facility] OR;  Service: General;  Laterality: N/A;  Parastomal Hernia Repair    REVISION OR CLOSURE, COLOSTOMY N/A 10/30/2018     Performed by [facility] at [facility] OR    RIGHT HEMICOLECTOMY Right 10/30/2018    Procedure: HEMICOLECTOMY, RIGHT;  Surgeon: Kevin Lindsay MD;  Location: [facility] OR;  Service: General;  Laterality: Right;       Clinical Decision Making:     History  Co-morbidities and personal factors that may impact the plan of care Examination  Body Structures and Functions, activity limitations and participation restrictions that may impact the plan of care Clinical Presentation   Decision Making/ Complexity Score   Co-morbidities:   [] Time since onset of injury / illness / exacerbation  [] Status of current condition  []Patient's cognitive status and safety concerns    [] Multiple Medical Problems (see med hx)  Personal Factors:   [] Patient's age  [] Prior Level of function   [] Patient's home situation (environment and family support)  [] Patient's level of motivation  [] Expected progression of patient      HISTORY:(criteria)    [] 59043 - no personal factors/history    [] 34627 - has 1-2 personal factor/comorbidity     [] 72025 - has >3 personal factor/comorbidity     Body Regions:  [] Objective examination findings  [] Head     []  Neck  [] Trunk   [] Upper Extremity  [] Lower Extremity    Body Systems:  [] For communication ability, affect, cognition, language, and learning style: the assessment of the ability to make needs known, consciousness, orientation (person, place, and time), expected emotional /behavioral responses, and learning preferences (eg, learning barriers, education  needs)  [] For the neuromuscular system: a general assessment of gross coordinated movement (eg, balance, gait, locomotion, transfers, and transitions) and motor function  (motor control and motor learning)  [] For the musculoskeletal system: the assessment of gross symmetry, gross range of motion, gross strength, height, and weight  [] For the integumentary system: the assessment of pliability(texture), presence of scar formation,  skin color, and skin integrity  [] For cardiovascular/pulmonary system: the assessment of heart rate, respiratory rate, blood pressure, and edema     Activity limitations:    [] Patient's cognitive status and saf ety concerns          [] Status of current condition      [] Weight bearing restriction  [] Cardiopulmunary Restriction    Participation Restrictions:   [] Goals and goal agreement with the patient     [] Rehab potential (prognosis) and probable outcome      Examination of Body System: (criteria)    [] 07329 - addressing 1-2 elements    [] 64557 - addressing a total of 3 or more elements     [] 39451 -  Addressing a total of 4 or more elements         Clinical Presentation: (criteria)  Choose one     On examination of body system using standardized tests and measures patient presents with (CHOOSE ONE) elements from any of the following: body structures and functions, activity limitations, and/or participation restrictions.  Leading to a clinical presentation that is considered (CHOOSE ONE)                              Clinical Decision Making  (Eval Complexity):  Choose One     Time Tracking:     PT Received On: 11/01/18  PT Start Time: 0835     PT Stop Time: 0900  PT Total Time (min): 25 min     Billable Minutes: Evaluation 15 and Therapeutic Activity 10     PT ENCOURAGED TO CALL FOR ASSISTANCE WITH ALL NEEDS DUE TO FALL RISK STATUS, PT AGREEABLE    Ilda Gould, PT  11/01/2018

## 2018-11-01 NOTE — ASSESSMENT & PLAN NOTE
Post op hypotension responding well to levophed; wean as tolerated  ICU hemodynamic monitoring  11/1 sp ICD.

## 2018-11-01 NOTE — ASSESSMENT & PLAN NOTE
off Levophed, weakness tolerated  Measures central venous pressure  Cardiology consultation per Critical Care if they feel it is needed  Transfuse prbc

## 2018-11-01 NOTE — EICU
Notified of decreased H/H    No evidence of active bleed and hemodynamically stable  Confirmed H/H to be low       10/31/2018 03:40 11/1/2018 03:50 11/1/2018 05:25   WBC 30.85 (H) 14.64 (H) 14.82 (H)   RBC 3.46 (L) 2.41 (L) 2.40 (L)   Hemoglobin 9.8 (L) 6.6 (L) 6.9 (L)   Hematocrit 29.4 (L) 20.3 (L) 20.4 (L)   Platelets 182 86 (L) 106 (L)       · Patient typed and crossed

## 2018-11-01 NOTE — ASSESSMENT & PLAN NOTE
Postop day 2 from a right hemicolectomy, colostomy reversal protective ileostomy.  Monitor ileostomy.

## 2018-11-01 NOTE — PROGRESS NOTES
[facility] -   Nephrology  Progress Note    Patient Name: Yan Choudhury  MRN: [id number]  Admission Date: 10/30/2018  Hospital Length of Stay: 2 days  Attending Provider: Kevin Lindsay MD   Primary Care Physician: Sandra Estevez MD  Principal Problem:Ischemic cardiomyopathy    Subjective:     HPI: Yan Choudhury is a pleasant 63-year-old  gentleman with history of congestive heart failure, CAD, cardiomyopathy, LV ejection fraction about 30%, CKD stage 3/4, baseline creatinine about 2.5 mg/dL, was admitted to the hospital for elective to surgery, he underwent hemicolectomy and revision colostomy, patient's serum creatinine increased from a baseline of 2.5 to 3.4 mg/dL, his urine output also has been about 30 mL/hour, we were consulted for acute on chronic kidney failure, patient was hypotensive following his surgery requiring pressor support.    Interval History: pt seen and chart reviewed , no acute events, receiving pRBC tx ,     Review of patient's allergies indicates:No Known Allergies      Current Facility-Administered Medications   Medication Frequency    0.9%  NaCl infusion (for blood administration) Q24H PRN    acetaminophen tablet 1,000 mg Q8H    allopurinol tablet 100 mg Daily    bimatoprost 0.01 % ophthalmic drops 1 drop QHS    carvedilol tablet 12.5 mg BID    chlorhexidine 0.12 % solution 10 mL BID    dextrose 5 % and 0.9 % NaCl infusion Continuous    dextrose 50% injection 12.5 g PRN    diphenhydrAMINE capsule 25 mg PRN    enoxaparin injection 30 mg Daily    glucagon (human recombinant) injection 1 mg PRN    HYDROmorphone injection 1 mg Q6H PRN    HYDROmorphone injection Syrg 0.5 mg Q1H PRN    insulin aspart U-100 pen 1-10 Units Q6H PRN    insulin detemir U-100 pen 10 Units Daily    metoclopramide HCl injection 10 mg Q10 Min PRN    metoclopramide HCl injection 5 mg Q6H PRN    morphine injection 2 mg Q5 Min PRN    NORepinephrine bitartrate 8 mg in dextrose 5%  250 mL infusion Continuous    nozaseptin (NOZIN) nasal  BID    ondansetron disintegrating tablet 8 mg Q8H PRN    oxyCODONE immediate release tablet 5 mg Q3H PRN    pantoprazole EC tablet 40 mg QHS    simvastatin tablet 10 mg QHS    sodium chloride 0.9% flush 3 mL Q8H    sodium chloride 0.9% flush 3 mL PRN    sodium chloride 0.9% flush 5 mL PRN       Objective:     Vital Signs (Most Recent):  Temp: 98.4 °F (36.9 °C) (11/01/18 1100)  Pulse: 93 (11/01/18 1100)  Resp: 20 (11/01/18 1100)  BP: (!) 120/56 (11/01/18 1100)  SpO2: 100 % (11/01/18 1100)  O2 Device (Oxygen Therapy): nasal cannula (11/01/18 1100) Vital Signs (24h Range):  Temp:  [98.2 °F (36.8 °C)-99.2 °F (37.3 °C)] 98.4 °F (36.9 °C)  Pulse:  [] 93  Resp:  [12-29] 20  SpO2:  [93 %-100 %] 100 %  BP: ()/(42-76) 120/56     Weight: 93.8 kg (206 lb 12.7 oz) (11/01/18 0535)  Body mass index is 30.99 kg/m².  Body surface area is 2.13 meters squared.    I/O last 3 completed shifts:  In: 5137.8 [P.O.:660; I.V.:3477.8; Blood:500; IV Piggyback:500]  Out: 1395 [Urine:1140; Drains:255]    Physical Exam   Constitutional: He is oriented to person, place, and time. He appears well-developed and well-nourished. No distress.   HENT:   Head: Normocephalic and atraumatic.   Eyes: Pupils are equal, round, and reactive to light.   Neck: Normal range of motion. Neck supple. No tracheal deviation present. No thyromegaly present.   Cardiovascular: Normal rate, regular rhythm and intact distal pulses. Exam reveals no gallop and no friction rub.   Murmur heard.  Pulmonary/Chest: Breath sounds normal. He has no wheezes. He has no rales.   Abdominal: Soft. He exhibits no mass. There is no tenderness. There is no rebound and no guarding.   Colostomy in place, dressings clean,    Musculoskeletal: Normal range of motion. He exhibits no edema.   Lymphadenopathy:     He has no cervical adenopathy.   Neurological: He is alert and oriented to person, place, and time.    Skin: Skin is warm. No rash noted. He is not diaphoretic. No erythema.   Nursing note and vitals reviewed.      Significant Labs:    CBC:   Recent Labs   Lab 11/01/18  0525   WBC 14.82*   RBC 2.40*   HGB 6.9*   HCT 20.4*   *   MCV 85   MCH 28.8   MCHC 33.8     CMP:   Recent Labs   Lab 11/01/18  0350   *  184*   CALCIUM 7.9*  7.9*   ALBUMIN 2.6*   PROT 5.4*     136   K 3.9  3.9   CO2 22*  22*     106   BUN 52*  52*   CREATININE 3.2*  3.2*   ALKPHOS 37*   ALT 9*   AST 24   BILITOT 0.6     Coagulation: No results for input(s): PT, INR, APTT in the last 168 hours.  LFTs:   Recent Labs   Lab 11/01/18  0350   ALT 9*   AST 24   ALKPHOS 37*   BILITOT 0.6   PROT 5.4*   ALBUMIN 2.6*     All labs within the past 24 hours have been reviewed.     Significant Imaging:  Reviewed     Lab Results   Component Value Date    HGBA1C 6.5 (H) 10/30/2018       Lab Results   Component Value Date    .0 (H) 08/10/2018    CALCIUM 7.9 (L) 11/01/2018    CALCIUM 7.9 (L) 11/01/2018    PHOS 3.1 11/01/2018           Assessment/Plan:     BROOKE (acute kidney injury)    1. BROOKE on CKD stage 4 :  Patient's baseline serum creatinine about 2.5 mg/dL, acute kidney injury on chronic kidney disease due to hemodynamic reasons following major abdominal surgery, creatinine improved from 3.5 to 3.2 mg/dl, reduce IV fluid rate to 50 ml/hr due to cardiomyopathy ,      will hold Bumex and metolazone ( home meds ) at this time, one dose of IV Lasix between 2 units pRBC transfusion ,     2.  Hypertension - BP controlled,      3.  Anemia - multifactorial, PRBC transfusion today ,     4.  Cardiomyopathy - was on diuretics prior to admission to the hospital will hold these at this time, can be restarted when indicated,  was on Bumex 2 mg twice a day and metolazone 2.5 mg 3 times a week on Monday Wednesday Friday,     5. Leukocytosis - abx per surgery service, CCM ,     6. Stable lytes               I will follow-up with patient.  Please contact us if you have any additional questions.     Total time spent 40 minutes including time needed to review the records,  patient  evaluation, documentation, face-to-face discussion with the patient,  primary team,   more than 50% of the time was spent on coordination of care and counseling.       Hernandez Martinez MD  Nephrology  [facility] [place]

## 2018-11-01 NOTE — PHYSICIAN QUERY
PT Name: Yan Choudhury  MR #: [id number]  Physician Query Form - Renal Condition Clarification     CDS/: Brandy E Capley               Contact information: spectralink:  [phone]    This form is a permanent document in the medical record.     QueryDate: November 1, 2018    By submitting this query, we are merely seeking further clarification of documentation. Please utilize your independent clinical judgment when addressing the question(s) below.    The Medical record contains the following:   Indicator Supporting Clinical Findings Location in Medical Record    Kidney (Renal) Insufficiency     X Kidney (Renal) Failure / Injury BROOKE (acute kidney injury)  1. BROOKE on CKD stage 4 :  Patient's baseline serum creatinine about 2.5 mg/dL, acute kidney injury on chronic kidney disease due to hemodynamic reasons following major abdominal surgery, patient was hypotensive, blood pressure improved with IV fluids and pressor support, patient is nonoliguric, will follow his renal function closely, will stop Bumex and metolazone while receiving IV fluids,     2.  Hypotension - continue IV fluids, pressor support, monitor for fluid status closely as patient has cardiomyopathy with low LV ejection fraction,      3.  Anemia - multifactorial, PRBC transfusion when indicated    Vital Signs (24h Range):  BP: ()/(38-99) 123/53   Nephrology consult 10/31    Nephrotoxic Agents     X BUN/Creatinine GFR BUN, Bld: 38 (H)  Creatinine: 2.5 (H)  eGFR if African American: 30 (A)    BUN, Bld: 46 (H)  Creatinine: 3.1 (H)  eGFR if : 23 (A)    BUN, Bld: 50 (H)  Creatinine: 3.4 (H)  eGFR if : 21 (A)    BUN, Bld: 52 (H)  Creatinine: 3.2 (H)  eGFR if : 22 (A)    BUN, Bld: 52 (H)  Creatinine: 3.2 (H)  eGFR if : 22 (A)   Labs 10/30/2018 15:30        Labs 10/31/2018 03:40        Labs 10/31/2018 11:14        Labs 11/1/2018 03:50        Labs 11/1/2018 03:50    Urine: Casts          Eosinophils      Dehydration      Nausea/Vomiting      Dialysis/CRRT      Treatment:      Other:        Acute Kidney Injury / Acute Renal Failure has different defining criteria. A generally accepted guideline  is:   A greater than 100% (2X) rise in serum creatinine from baseline* occurring during the course of a single hospital stay.   *Baseline as determined by the providers judgment and consideration of previous lab values and other documentation, if available.    A diagnosis of Acute Kidney Injury/ Acute Renal Failure should incorporate abnormal labs and clinical findings that are clinically significant      References: 1. Noah et al. Acute renal failure-definition, outcome measures, animal models, fluid therapy and information technology needs: the Second International Consensus Conference of the Acute Dialysis Quality Initiative (ADQI) Group. Crit Care 2004; 8:B204; 2. Pippa et al. Acute Kidney Injury Network: report of an initiative to improve outcomes in acute kidney injury. Crit Care 2007; 11:R31; 3. Kidney Disease: Improving Global Outcomes (KDIGO). Acute Kidney Injury Work Group. KDIGO clinical practice guidelines for acute kidney injury. Kidney Int Suppl 2012; 2:1.    The clinical guidelines noted below is only a system guideline, it does not replace the providers clinical judgment.    Provider, please specify the diagnosis or diagnoses associated with above clinical findings.    [    ]  Acute Kidney Failure/Injury with Tubular Necrosis  Damage to the tubule cells of the kidney. Common triggers: shock, hypotension, IV contrast, rhabdomyolysis, medications  [    ]  Unspecified Acute Kidney Failure/Injury     [  c  ]  Other (please specify): ____acute on chronic stage 3 see nephology notes___________________________  [  ]  Clinically Undetermined    Please document in your progress notes daily for the duration of treatment until resolved and include in your discharge summary.

## 2018-11-01 NOTE — SUBJECTIVE & OBJECTIVE
Interval History: off levophed, anemia, mild pain  Transfer to floor    Medications:  Continuous Infusions:   dextrose 5 % and 0.9 % NaCl 100 mL/hr at 11/01/18 0800    norepinephrine bitartrate-D5W Stopped (10/31/18 1828)     Scheduled Meds:   acetaminophen  1,000 mg Oral Q8H    allopurinol  100 mg Oral Daily    bimatoprost  1 drop Both Eyes QHS    carvedilol  12.5 mg Oral BID    chlorhexidine  10 mL Mouth/Throat BID    enoxaparin  30 mg Subcutaneous Daily    insulin detemir U-100  10 Units Subcutaneous Daily    nozaseptin   Each Nare BID    pantoprazole  40 mg Oral QHS    simvastatin  10 mg Oral QHS    sodium chloride 0.9%  3 mL Intravenous Q8H     PRN Meds:sodium chloride, dextrose 50%, diphenhydrAMINE, glucagon (human recombinant), HYDROmorphone, HYDROmorphone, insulin aspart U-100, metoclopramide HCl, metoclopramide HCl, morphine, ondansetron, oxyCODONE, sodium chloride 0.9%, sodium chloride 0.9%     Review of patient's allergies indicates:  No Known Allergies  Objective:     Vital Signs (Most Recent):  Temp: 98.4 °F (36.9 °C) (11/01/18 0705)  Pulse: 94 (11/01/18 0800)  Resp: 16 (11/01/18 0800)  BP: (!) 106/58 (11/01/18 0800)  SpO2: 97 % (11/01/18 0800) Vital Signs (24h Range):  Temp:  [98.2 °F (36.8 °C)-99.2 °F (37.3 °C)] 98.4 °F (36.9 °C)  Pulse:  [] 94  Resp:  [12-29] 16  SpO2:  [93 %-100 %] 97 %  BP: ()/(42-76) 106/58     Weight: 93.8 kg (206 lb 12.7 oz)  Body mass index is 30.99 kg/m².    Intake/Output - Last 3 Shifts       10/30 0700 - 10/31 0659 10/31 0700 - 11/01 0659 11/01 0700 - 11/02 0659    P.O. 480 180     I.V. (mL/kg) 4430 (46) 2134.7 (22.8)     Blood  500     IV Piggyback  500     Total Intake(mL/kg) 4910 (50.9) 3314.7 (35.3)     Urine (mL/kg/hr) 455 (0.2) 930 (0.4) 180 (1)    Drains 260 135     Blood 325      Total Output 1040 1065 180    Net +3870 +2249.7 -180                 Physical Exam   Constitutional: He is oriented to person, place, and time. No distress.    Chronically ill   HENT:   Head: Normocephalic and atraumatic.   Eyes: EOM are normal. Pupils are equal, round, and reactive to light. No scleral icterus.   Neck: Normal range of motion.   Cardiovascular: Normal rate, regular rhythm and normal heart sounds.   Pulmonary/Chest: Effort normal and breath sounds normal.   Abdominal: Soft. Bowel sounds are normal. He exhibits no distension. There is tenderness.   Wound dressed. serosanguinous drainage, at old colosotmy site as expected   Musculoskeletal: Normal range of motion.   Neurological: He is alert and oriented to person, place, and time.   Skin: Skin is warm and dry. Capillary refill takes less than 2 seconds.   Psychiatric: He has a normal mood and affect. His behavior is normal. Judgment and thought content normal.   Vitals reviewed.      Significant Labs:  CBC:   Recent Labs   Lab 11/01/18  0525   WBC 14.82*   RBC 2.40*   HGB 6.9*   HCT 20.4*   *   MCV 85   MCH 28.8   MCHC 33.8     BMP:   Recent Labs   Lab 11/01/18  0350   *  184*     136   K 3.9  3.9     106   CO2 22*  22*   BUN 52*  52*   CREATININE 3.2*  3.2*   CALCIUM 7.9*  7.9*   MG 1.6     CMP:   Recent Labs   Lab 11/01/18  0350   *  184*   CALCIUM 7.9*  7.9*   ALBUMIN 2.6*   PROT 5.4*     136   K 3.9  3.9   CO2 22*  22*     106   BUN 52*  52*   CREATININE 3.2*  3.2*   ALKPHOS 37*   ALT 9*   AST 24   BILITOT 0.6     Coagulation: No results for input(s): LABPROT, INR, APTT in the last 168 hours.    Significant Diagnostics:  none

## 2018-11-01 NOTE — PLAN OF CARE
Problem: Occupational Therapy Goal  Goal: Occupational Therapy Goal  Outcome: Ongoing (interventions implemented as appropriate)  1)  PT WILL PERFORM X2 SETS X10 REPS OF LOW RESISTANCE THERE EX WILL GOAL OF INCREASED BUE FX STRENGTH AND INCREASED FX ENDURANCE.  2)  PT WILL DON/DOFF SOCKS WITH MAX (A) WITH AE PRN.  3)  PT WILL PERFORM UB DRESSING WITH MOD (A).    Comments: PLAN TO ADDRESS GOALS WITH FOCUS ON INCREASED STRENGTH AND ENDURANCE TO INCREASE SAFETY/(I) WITH ALL AREAS OF SELF CARE.

## 2018-11-01 NOTE — SUBJECTIVE & OBJECTIVE
Interval History: pt seen and chart reviewed , no acute events, receiving pRBC tx ,     Review of patient's allergies indicates:No Known Allergies      Current Facility-Administered Medications   Medication Frequency    0.9%  NaCl infusion (for blood administration) Q24H PRN    acetaminophen tablet 1,000 mg Q8H    allopurinol tablet 100 mg Daily    bimatoprost 0.01 % ophthalmic drops 1 drop QHS    carvedilol tablet 12.5 mg BID    chlorhexidine 0.12 % solution 10 mL BID    dextrose 5 % and 0.9 % NaCl infusion Continuous    dextrose 50% injection 12.5 g PRN    diphenhydrAMINE capsule 25 mg PRN    enoxaparin injection 30 mg Daily    glucagon (human recombinant) injection 1 mg PRN    HYDROmorphone injection 1 mg Q6H PRN    HYDROmorphone injection Syrg 0.5 mg Q1H PRN    insulin aspart U-100 pen 1-10 Units Q6H PRN    insulin detemir U-100 pen 10 Units Daily    metoclopramide HCl injection 10 mg Q10 Min PRN    metoclopramide HCl injection 5 mg Q6H PRN    morphine injection 2 mg Q5 Min PRN    NORepinephrine bitartrate 8 mg in dextrose 5% 250 mL infusion Continuous    nozaseptin (NOZIN) nasal  BID    ondansetron disintegrating tablet 8 mg Q8H PRN    oxyCODONE immediate release tablet 5 mg Q3H PRN    pantoprazole EC tablet 40 mg QHS    simvastatin tablet 10 mg QHS    sodium chloride 0.9% flush 3 mL Q8H    sodium chloride 0.9% flush 3 mL PRN    sodium chloride 0.9% flush 5 mL PRN       Objective:     Vital Signs (Most Recent):  Temp: 98.4 °F (36.9 °C) (11/01/18 1100)  Pulse: 93 (11/01/18 1100)  Resp: 20 (11/01/18 1100)  BP: (!) 120/56 (11/01/18 1100)  SpO2: 100 % (11/01/18 1100)  O2 Device (Oxygen Therapy): nasal cannula (11/01/18 1100) Vital Signs (24h Range):  Temp:  [98.2 °F (36.8 °C)-99.2 °F (37.3 °C)] 98.4 °F (36.9 °C)  Pulse:  [] 93  Resp:  [12-29] 20  SpO2:  [93 %-100 %] 100 %  BP: ()/(42-76) 120/56     Weight: 93.8 kg (206 lb 12.7 oz) (11/01/18 0535)  Body mass index is  30.99 kg/m².  Body surface area is 2.13 meters squared.    I/O last 3 completed shifts:  In: 5137.8 [P.O.:660; I.V.:3477.8; Blood:500; IV Piggyback:500]  Out: 1395 [Urine:1140; Drains:255]    Physical Exam   Constitutional: He is oriented to person, place, and time. He appears well-developed and well-nourished. No distress.   HENT:   Head: Normocephalic and atraumatic.   Eyes: Pupils are equal, round, and reactive to light.   Neck: Normal range of motion. Neck supple. No tracheal deviation present. No thyromegaly present.   Cardiovascular: Normal rate, regular rhythm and intact distal pulses. Exam reveals no gallop and no friction rub.   Murmur heard.  Pulmonary/Chest: Breath sounds normal. He has no wheezes. He has no rales.   Abdominal: Soft. He exhibits no mass. There is no tenderness. There is no rebound and no guarding.   Colostomy in place, dressings clean,    Musculoskeletal: Normal range of motion. He exhibits no edema.   Lymphadenopathy:     He has no cervical adenopathy.   Neurological: He is alert and oriented to person, place, and time.   Skin: Skin is warm. No rash noted. He is not diaphoretic. No erythema.   Nursing note and vitals reviewed.      Significant Labs:    CBC:   Recent Labs   Lab 11/01/18  0525   WBC 14.82*   RBC 2.40*   HGB 6.9*   HCT 20.4*   *   MCV 85   MCH 28.8   MCHC 33.8     CMP:   Recent Labs   Lab 11/01/18  0350   *  184*   CALCIUM 7.9*  7.9*   ALBUMIN 2.6*   PROT 5.4*     136   K 3.9  3.9   CO2 22*  22*     106   BUN 52*  52*   CREATININE 3.2*  3.2*   ALKPHOS 37*   ALT 9*   AST 24   BILITOT 0.6     Coagulation: No results for input(s): PT, INR, APTT in the last 168 hours.  LFTs:   Recent Labs   Lab 11/01/18  0350   ALT 9*   AST 24   ALKPHOS 37*   BILITOT 0.6   PROT 5.4*   ALBUMIN 2.6*     All labs within the past 24 hours have been reviewed.     Significant Imaging:  Reviewed     Lab Results   Component Value Date    HGBA1C 6.5 (H) 10/30/2018        Lab Results   Component Value Date    .0 (H) 08/10/2018    CALCIUM 7.9 (L) 11/01/2018    CALCIUM 7.9 (L) 11/01/2018    PHOS 3.1 11/01/2018

## 2018-11-01 NOTE — PROGRESS NOTES
[facility] -   Critical Care Medicine  Progress Note    Patient Name: Yan Choudhury  MRN: [id number]  Admission Date: 10/30/2018  Hospital Length of Stay: 2 days  Code Status: Full Code  Attending Provider: Kevin Lindsay MD  Primary Care Provider: Sandra Estevez MD   Principal Problem: Ischemic cardiomyopathy    Subjective:     HPI:  63 year old male with extensive PMH including CAD, CHF, ischemic cardiomyopathy with EF 30-35%; CKD3, DM2, HTN, HLD, obesity  Colonoscopy performed in preparation for parastomal hernia repair and reversal of colostomy; colonoscopy identified mass    Admitted this am for colon resection, colostomy reversal and repair of peristomal hernia        Hospital/ICU Course:  10/30 - Admitted to ICU after recovery from colon resection, ileostomy creation; hypotensive on low dose levophed to Rt IJ CL; rt radial arterial line in place; drowsy post anesthesia, appropriate when aroused, oriented x 3  10/31 - remains on pressor support with rising creatinine and marginal urine output overnight; hyperglycemic 222-302 overnight despite SSI  11/1 seen and examined. Sitting in chair. O2 sat 96% on 3 liters O2 via nasal cannula. Afebrile. POD#2    Interval History:   Review of Systems   Constitutional: Negative for chills and fever.   HENT: Negative for hearing loss, nosebleeds and sore throat.    Eyes: Negative for discharge.   Respiratory: Negative for hemoptysis and shortness of breath.    Cardiovascular: Negative for chest pain.   Gastrointestinal: Positive for abdominal pain (incisional ). Negative for blood in stool and nausea.   Genitourinary: Negative for hematuria.   Musculoskeletal: Negative for falls and myalgias.   Skin: Negative for itching.   Neurological: Negative for focal weakness, seizures and loss of consciousness.   Endo/Heme/Allergies: Negative for polydipsia.   Psychiatric/Behavioral: Negative for memory loss. The patient is not nervous/anxious.        Objective:     Vital  Signs (Most Recent):  Temp: 97.9 °F (36.6 °C) (11/01/18 1240)  Pulse: 92 (11/01/18 1240)  Resp: 18 (11/01/18 1240)  BP: (!) 121/59 (11/01/18 1240)  SpO2: 95 % (11/01/18 1240) Vital Signs (24h Range):  Temp:  [97.9 °F (36.6 °C)-99.2 °F (37.3 °C)] 97.9 °F (36.6 °C)  Pulse:  [] 92  Resp:  [12-29] 18  SpO2:  [93 %-100 %] 95 %  BP: ()/(42-76) 121/59     Weight: 93.8 kg (206 lb 12.7 oz)  Body mass index is 30.99 kg/m².      Intake/Output Summary (Last 24 hours) at 11/1/2018 1302  Last data filed at 11/1/2018 1100  Gross per 24 hour   Intake 2383 ml   Output 1440 ml   Net 943 ml       Physical Exam   Constitutional: He is oriented to person, place, and time. He appears well-developed and well-nourished. No distress.   HENT:   Head: Normocephalic and atraumatic.   Eyes: Pupils are equal, round, and reactive to light.   Neck: Normal range of motion. Neck supple.   Rt TLC    Cardiovascular: Normal rate, regular rhythm and intact distal pulses. Exam reveals no gallop and no friction rub.   Murmur heard.  Pulmonary/Chest: Breath sounds normal. He has no wheezes. He has no rales.   Abdominal: Soft. He exhibits no mass. There is no tenderness. There is no rebound and no guarding.   Colostomy in place, dressings clean,    Genitourinary:   Genitourinary Comments: Guzman    Musculoskeletal: Normal range of motion. He exhibits no edema.   Lymphadenopathy:     He has no cervical adenopathy.   Neurological: He is alert and oriented to person, place, and time.   Skin: Skin is warm. No rash noted. He is not diaphoretic. No erythema.   Psychiatric: He has a normal mood and affect.   Nursing note and vitals reviewed.      Vents:  Oxygen Concentration (%): 30 (10/31/18 2305)    Lines/Drains/Airways     Central Venous Catheter Line                 Percutaneous Central Line Insertion/Assessment - triple lumen  10/30/18 0849 right internal jugular 2 days          Drain                 Closed/Suction Drain 10/30/18 1300 Abdomen Bulb  2 days         Urethral Catheter 10/30/18 0856 Straight-tip;Non-latex 16 Fr. 2 days         Colostomy 10/30/18 1340 RLQ 1 day          Peripheral Intravenous Line                 Peripheral IV - Single Lumen 10/30/18 0803 Right Hand 2 days                Significant Labs:    CBC/Anemia Profile:  Recent Labs   Lab 10/31/18  0340 11/01/18  0350 11/01/18  0525   WBC 30.85* 14.64* 14.82*   HGB 9.8* 6.6* 6.9*   HCT 29.4* 20.3* 20.4*    86* 106*   MCV 85 84 85   RDW 16.3* 16.5* 16.6*        Chemistries:  Recent Labs   Lab 10/31/18  0340 10/31/18  1114 11/01/18  0350   * 136 136  136   K 4.3 4.2 3.9  3.9    104 106  106   CO2 20* 21* 22*  22*   BUN 46* 50* 52*  52*   CREATININE 3.1* 3.4* 3.2*  3.2*   CALCIUM 8.0* 7.9* 7.9*  7.9*   ALBUMIN  --   --  2.6*   PROT  --   --  5.4*   BILITOT  --   --  0.6   ALKPHOS  --   --  37*   ALT  --   --  9*   AST  --   --  24   MG 1.0* 1.7 1.6   PHOS  --   --  3.1         Significant Imaging:  CXR: I have reviewed all pertinent results/findings within the past 24 hours and my personal findings are:  clear lungs     [id number]:      1 - Biatrial enlargement.     2 - Mild left ventricular enlargement.     3 - Eccentric hypertrophy.     4 - Wall motion abnormalities.     5 - Moderately depressed left ventricular systolic function (EF 30-35%).     6 - Restrictive LV filling pattern, indicating markedly elevated LAP (grade 3 diastolic dysfunction).     7 - Right ventricular enlargement with low normal systolic function.     8 - The estimated PA systolic pressure is 35 mmHg.     9 - Mild tricuspid regurgitation.     10 - Moderate pulmonic regurgitation.     11 - Mild to moderate aortic regurgitation.     Assessment/Plan:     Cardiac/Vascular   * Ischemic cardiomyopathy    Post op hypotension responding well to levophed; wean as tolerated  ICU hemodynamic monitoring  11/1 sp ICD.      Chronic combined systolic and diastolic congestive heart failure    Strict  I/O  Recommend caution with IVF  11/1 dc IVF. Received 60mg lasix today.      Renal/   BROOKE (acute kidney injury)    11/1 I/Os . Monitor BMP.      CKD (chronic kidney disease) stage 4, GFR 15-29 ml/min    Avoid nephrotoxins  Strict I/O  Monitor creatinine  Nephrology following     Oncology   Anemia    11/1 Transfuse 2 PRBCs      Endocrine   Obesity (BMI 30.0-34.9)    Recommend diet, lifestyle modifications for goal weight loss     Type 2 diabetes mellitus with stage 4 chronic kidney disease, with long-term current use of insulin    SSI with monitoring for glucose control and prevention of insulin toxicity     GI   S/P partial colectomy    With ileostomy placement   Surgery following  Diet per surgeon     Other   Nocturnal hypoxemia    11/1 Last evaluated as OP with pulmonology , plan was to use O2 during sleep. No optimal titration achieved. Needs OP pulmonology follow up .         Critical Care Daily Checklist:    A: Awake: RASS Goal/Actual Goal:    Actual: Ordonez Agitation Sedation Scale (RASS): Alert and calm   B: Spontaneous Breathing Trial Performed?     C: SAT & SBT Coordinated?  NA                      D: Delirium: CAM-ICU Overall CAM-ICU: Negative   E: Early Mobility Performed? Yes. sITTING IN CHAIR    F: Feeding Goal:    Status:     Current Diet Order   Procedures    Diet NPO Except for: Ice Chips, Sips with Medication     Order Specific Question:   Except for     Answer:   Ice Chips     Order Specific Question:   Except for     Answer:   Sips with Medication      AS: Analgesia/Sedation Hydromorphone prn    T: Thromboembolic Prophylaxis Lovenox 30 mg sc daily    H: HOB > 300 Yes   U: Stress Ulcer Prophylaxis (if needed) PPI   G: Glucose Control FS q 4 hrs    B: Bowel Function     I: Indwelling Catheter (Lines & Wayne) Necessity Keep wayne    D: De-escalation of Antimicrobials/Pharmacotherapies No abx     Plan for the day/ETD Y    Code Status:  Family/Goals of Care: Full Code  Y     Critical Care Time:  35 minutes  Critical secondary to Patient has a condition that poses threat to life and bodily function: Acute Renal Failure on CKD      Critical care was time spent personally by me on the following activities: development of treatment plan with patient or surrogate and bedside caregivers, discussions with consultants, evaluation of patient's response to treatment, examination of patient, ordering and performing treatments and interventions, ordering and review of laboratory studies, ordering and review of radiographic studies, pulse oximetry, re-evaluation of patient's condition. This critical care time did not overlap with that of any other provider or involve time for any procedures.     Lizette Plasencia MD  Critical Care Medicine  [facility] -

## 2018-11-01 NOTE — PLAN OF CARE
CM explaioned d/c plan. CM consult for HH. CM printed off a list for [facility] for HH for the patient . He wants to discuss this with his spouse. Spouse is not available at this time. Cm  To  f/u with safe transition     11/01/18 1405   Discharge Reassessment   Assessment Type Discharge Planning Reassessment   Provided patient/caregiver education on the expected discharge date and the discharge plan No   Do you have any problems affording any of your prescribed medications? No   Discharge Plan A Home with family;Home Health   Discharge Plan B Home with family;Home Health   Patient choice form signed by patient/caregiver N/A   Anticipated Discharge Disposition Home-Health   Can the patient answer the patient profile reliably? Yes, cognitively intact   How does the patient rate their overall health at the present time? Good   Describe the patient's ability to walk at the present time. Minor restrictions or changes   How often would a person be available to care for the patient? Often   Number of comorbid conditions (as recorded on the chart) Five or more   During the past month, has the patient often been bothered by feeling down, depressed or hopeless? No   During the past month, has the patient often been bothered by little interest or pleasure in doing things? No

## 2018-11-01 NOTE — ASSESSMENT & PLAN NOTE
11/1 Last evaluated as OP with pulmonology , plan was to use O2 during sleep. No optimal titration achieved. Needs OP pulmonology follow up .

## 2018-11-01 NOTE — SUBJECTIVE & OBJECTIVE
Interval History:   Review of Systems   Constitutional: Negative for chills and fever.   HENT: Negative for hearing loss, nosebleeds and sore throat.    Eyes: Negative for discharge.   Respiratory: Negative for hemoptysis and shortness of breath.    Cardiovascular: Negative for chest pain.   Gastrointestinal: Positive for abdominal pain (incisional ). Negative for blood in stool and nausea.   Genitourinary: Negative for hematuria.   Musculoskeletal: Negative for falls and myalgias.   Skin: Negative for itching.   Neurological: Negative for focal weakness, seizures and loss of consciousness.   Endo/Heme/Allergies: Negative for polydipsia.   Psychiatric/Behavioral: Negative for memory loss. The patient is not nervous/anxious.        Objective:     Vital Signs (Most Recent):  Temp: 97.9 °F (36.6 °C) (11/01/18 1240)  Pulse: 92 (11/01/18 1240)  Resp: 18 (11/01/18 1240)  BP: (!) 121/59 (11/01/18 1240)  SpO2: 95 % (11/01/18 1240) Vital Signs (24h Range):  Temp:  [97.9 °F (36.6 °C)-99.2 °F (37.3 °C)] 97.9 °F (36.6 °C)  Pulse:  [] 92  Resp:  [12-29] 18  SpO2:  [93 %-100 %] 95 %  BP: ()/(42-76) 121/59     Weight: 93.8 kg (206 lb 12.7 oz)  Body mass index is 30.99 kg/m².      Intake/Output Summary (Last 24 hours) at 11/1/2018 1302  Last data filed at 11/1/2018 1100  Gross per 24 hour   Intake 2383 ml   Output 1440 ml   Net 943 ml       Physical Exam   Constitutional: He is oriented to person, place, and time. He appears well-developed and well-nourished. No distress.   HENT:   Head: Normocephalic and atraumatic.   Eyes: Pupils are equal, round, and reactive to light.   Neck: Normal range of motion. Neck supple.   Rt TLC    Cardiovascular: Normal rate, regular rhythm and intact distal pulses. Exam reveals no gallop and no friction rub.   Murmur heard.  Pulmonary/Chest: Breath sounds normal. He has no wheezes. He has no rales.   Abdominal: Soft. He exhibits no mass. There is no tenderness. There is no rebound and no  guarding.   Colostomy in place, dressings clean,    Genitourinary:   Genitourinary Comments: Guzman    Musculoskeletal: Normal range of motion. He exhibits no edema.   Lymphadenopathy:     He has no cervical adenopathy.   Neurological: He is alert and oriented to person, place, and time.   Skin: Skin is warm. No rash noted. He is not diaphoretic. No erythema.   Psychiatric: He has a normal mood and affect.   Nursing note and vitals reviewed.      Vents:  Oxygen Concentration (%): 30 (10/31/18 2305)    Lines/Drains/Airways     Central Venous Catheter Line                 Percutaneous Central Line Insertion/Assessment - triple lumen  10/30/18 0849 right internal jugular 2 days          Drain                 Closed/Suction Drain 10/30/18 1300 Abdomen Bulb 2 days         Urethral Catheter 10/30/18 0856 Straight-tip;Non-latex 16 Fr. 2 days         Colostomy 10/30/18 1340 RLQ 1 day          Peripheral Intravenous Line                 Peripheral IV - Single Lumen 10/30/18 0803 Right Hand 2 days                Significant Labs:    CBC/Anemia Profile:  Recent Labs   Lab 10/31/18  0340 11/01/18  0350 11/01/18  0525   WBC 30.85* 14.64* 14.82*   HGB 9.8* 6.6* 6.9*   HCT 29.4* 20.3* 20.4*    86* 106*   MCV 85 84 85   RDW 16.3* 16.5* 16.6*        Chemistries:  Recent Labs   Lab 10/31/18  0340 10/31/18  1114 11/01/18  0350   * 136 136  136   K 4.3 4.2 3.9  3.9    104 106  106   CO2 20* 21* 22*  22*   BUN 46* 50* 52*  52*   CREATININE 3.1* 3.4* 3.2*  3.2*   CALCIUM 8.0* 7.9* 7.9*  7.9*   ALBUMIN  --   --  2.6*   PROT  --   --  5.4*   BILITOT  --   --  0.6   ALKPHOS  --   --  37*   ALT  --   --  9*   AST  --   --  24   MG 1.0* 1.7 1.6   PHOS  --   --  3.1         Significant Imaging:  CXR: I have reviewed all pertinent results/findings within the past 24 hours and my personal findings are:  clear lungs     [id number]/18:      1 - Biatrial enlargement.     2 - Mild left ventricular enlargement.     3 - Eccentric  hypertrophy.     4 - Wall motion abnormalities.     5 - Moderately depressed left ventricular systolic function (EF 30-35%).     6 - Restrictive LV filling pattern, indicating markedly elevated LAP (grade 3 diastolic dysfunction).     7 - Right ventricular enlargement with low normal systolic function.     8 - The estimated PA systolic pressure is 35 mmHg.     9 - Mild tricuspid regurgitation.     10 - Moderate pulmonic regurgitation.     11 - Mild to moderate aortic regurgitation.

## 2018-11-01 NOTE — CONSULTS
11/01/18 1300       Incision/Site 10/30/18 0716 Abdomen   Date First Assessed/Time First Assessed: 10/30/18 0716   Location: Abdomen   Wound Image    Incision WDL ex   Dressing Appearance Moist drainage   Drainage Amount Small   Drainage Characteristics/Odor Serosanguineous   Appearance Staples intact       Negative Pressure Wound Therapy    Placement Date/Time: 11/01/18 1100   Side: Left  Orientation: upper  Location: Abdomen   NPWT Type Vacuum Therapy   Therapy Setting NPWT Continuous therapy   Pressure Setting NPWT 125 mmHg   Therapy Interventions NPWT Seal intact   Sponges Inserted NPWT Black;1   General Output (mL) 0       Wound 11/01/18 Other (comment) upper Abdomen   Date First Assessed: 11/01/18   Pre-existing: Yes  Primary Wound Type: (c) Other (comment)  Side: Left  Orientation: upper  Location: Abdomen   Wound Image    Wound WDL ex   Dressing Appearance Moist drainage;Intact   Drainage Amount Moderate   Drainage Characteristics/Odor Serosanguineous   Appearance Red;Moist   Tissue loss description Full thickness   Periwound Area Dry;Intact   Wound Edges Open   Wound Length (cm) 4 cm   Wound Width (cm) 8 cm   Wound Depth (cm) 9 cm   Wound Volume (cm^3) 288 cm^3   Care Cleansed with:;Sterile normal saline;Applied:;Skin Barrier   Dressing Removed;Applied  (wound vac)   Packing Incision packing removed   Packing Removed 1   Periwound Care Absorptive dressing applied;Cleansed with pH balanced cleanser;Dry periwound area maintained;Skin barrier film applied   Dressing Change Due 11/05/18        Follow up visit with Mr. Choudhury for continued Ileostomy care and care of old colostomy site that is now open wound to LUQ.  Patient sitting up in recliner at time of visit.  Ileostomy pouch changed at this time. Stoma is moist and pink and edematous, measures 35mm with intact starr noted. Cleansed with saline, patted dry. Painted with cavilon.  Stoma ring applied to cover starr and one piece flat pouch cut to size and  applied to site. Patient tolerated this well. He has had colostomy for several years and is independent in ostomy care.  Written Ileostomy education left at bedside to review. Patient does not currently use ring, he does use flat one piece pouch, but is unsure of brand/type and states he wife takes care of ordering his supplies. Will follow up tomorrow. Dressing removed form ML abdominal incision. Staples intact. Small amount serosanguinous drainage noted. Cleansed with saline. Covered with dry gauze, abd pad, and secured with medipore tape.  LUQ old colostomy site wound packing removed. Wound measures [id number] with moist red wound bed tissue noted.  Irrigated well with sterile saline and patted dry.  Wound vac KCI applied at this time. Wound bed filled with one piece black foam and secured with drape. sensatrac pad and tubing applied and attached to wound vac ULTA at continuous -125 mmHg low intensity suction with good seal noted.  Patient tolerated wound care well. Will plan for next wound vac change on Monday, 11/5.    Reportable conditions for Patients utilizing Negative Pressure Wound Vac Therapy:  1. Loss of seal, raised foam, or wound drainage suddenly decreasing in amount or stopping.  2. Bright red blood or rapid filling of the cannister  3. Periwound erythema, heat, edema, pain, elevated temperature and white blood cell count, cloudy or foul-smelling wound drainage, excess bleeding, macerated periwound skin  4. Wound drainage becoming foul smelling and cloudy  5. Inability to trouble shoot alarm for greater than two (2) hours    Primary Nurse Assessment should include the following - Document VAC in the NPWT LDA  1. Document system patency  2. Amount and description of wound fluid  3. Pain level  4. Tolerance of NPWT  5. Level of suction   6. Color of foam  7. Any air leak noted

## 2018-11-01 NOTE — ASSESSMENT & PLAN NOTE
Combination of acute blood loss and chronic disease and dilutional    Will transfuse 2 prbc and check labs    Risks of blood discussed, hepatitis and hiv, very rare, transfusion reaction

## 2018-11-01 NOTE — PROGRESS NOTES
Notified EICU that pt's repeat CBC results are back and pt's H/H was 6.9/20.4, WBC 14.82, and platelets 106.

## 2018-11-01 NOTE — PT/OT/SLP EVAL
Occupational Therapy   Evaluation    Name: Yan Choudhury  MRN: [id number]  Admitting Diagnosis:  Ischemic cardiomyopathy 2 Days Post-Op    Recommendations:     Discharge Recommendations: (SNF VS. OT HH)  Discharge Equipment Recommendations:  (PT HAS SHOWER CHAIR BUT MAY BENEFIT FROM ETTB TO DECREASE FALL RISK)  Barriers to discharge:  None    History:     Occupational Profile:  Living Environment: PT LIVES WITH WIFE & SON  Previous level of function: PT STATED HE WAS (I) WITH ADL'S AND T/F'S.  PT STATED WIFE USUALLY COOKS AND CLEANS, BUT PT STATED HE MAY PREP MEAL SOMETIMES.    Roles and Routines: PT STATED HE SPENDS HIS TIME RECLINING AND WATCHING TV.  Equipment Used at Home:  shower chair(02 CONCENTRATOR PER PT)  Assistance upon Discharge: FAMILY AVAILABLE TO (A) PT AT HOME.    Past Medical History:   Diagnosis Date    Arthritis     CAD (coronary artery disease)     Cataract     CHF (congestive heart failure)     Dr Calvillo    Chronic combined systolic and diastolic congestive heart failure 3/24/2015    CKD (chronic kidney disease), stage III     Diabetes mellitus type II      AM    Diabetic retinopathy     anti Veg F injections for macular edema    Diverticulitis of colon     ED (erectile dysfunction)     Glaucoma     HTN (hypertension)     Hyperlipidemia     Ischemic cardiomyopathy     Obesity     JAHAIRA on CPAP     Pacemaker     Pulmonary HTN        Past Surgical History:   Procedure Laterality Date    CARDIAC CATHETERIZATION  2009    CHOLECYSTECTOMY      COLECTOMY-SIGMOID N/A 4/22/2016    Performed by Kevin Lindsay [facility] at [facility] OR    COLONOSCOPY N/A 10/11/2018    Procedure: COLONOSCOPY;  Surgeon: Quinn Aragon MD;  Location: [facility] ENDO;  Service: General;  Laterality: N/A;    COLONOSCOPY N/A 10/11/2018    Performed by Quinn [facility]agon MD at [facility] ENDO    COLOSTOMY N/A 4/22/2016    Performed by [facility] at [facility] OR    CREATION, ILEOSTOMY N/A 10/30/2018    Performed by  Kevin [facility] at [facility] OR    EYE SURGERY      HEMICOLECTOMY, RIGHT Right 10/30/2018    Performed by Kevin [facility] MD at [facility] OR    ILEOSTOMY N/A 10/30/2018    Procedure: CREATION, ILEOSTOMY;  Surgeon: Kevin Lindsay MD;  Location: [facility] OR;  Service: General;  Laterality: N/A;    KNEE SURGERY      MOBILIZATION OF SPLENIC FLEXURE N/A 10/30/2018    Procedure: MOBILIZATION, SPLENIC FLEXURE;  Surgeon: Kevin Lindsay MD;  Location: [facility] OR;  Service: General;  Laterality: N/A;    MOBILIZATION, SPLENIC FLEXURE N/A 10/30/2018    Performed by Kevin Lindsay MD at [facility] OR    REVISION COLOSTOMY N/A 10/30/2018    Procedure: REVISION OR CLOSURE, COLOSTOMY;  Surgeon: Kevin Lindsay MD;  Location: [facility] OR;  Service: General;  Laterality: N/A;  Parastomal Hernia Repair    REVISION OR CLOSURE, COLOSTOMY N/A 10/30/2018    Performed by Kevin Lindsay MD at [facility] OR    RIGHT HEMICOLECTOMY Right 10/30/2018    Procedure: HEMICOLECTOMY, RIGHT;  Surgeon: Kevin Lindsay MD;  Location: [facility] OR;  Service: General;  Laterality: Right;       Subjective     Chief Complaint: C/O DIFFICULTY WITH BELOW WAIST TASKS DUE TO SX.  Patient/Family Comments/goals: PT STATED HIS MAIN GOAL IS TO GET WELL.    Pain/Comfort:  · Pain Rating 1: 4/10  · Location 1: abdomen  · Pain Addressed 1: Reposition    Patients cultural, spiritual, Sikhism conflicts given the current situation:      Objective:     Communicated with: CHART REVIEW AND NURSE prior to session.  Patient found all lines intact, call button in reach and UP IN B/S CHAIR and blood pressure cuff, wayne catheter, oxygen, peripheral IV, pulse ox (continuous) upon OT entry to room.    General Precautions: Standard, fall(STANDARD, UNIVERSAL)   Orthopedic Precautions:N/A   Braces: N/A     Occupational Performance:    Bed Mobility:    · NT DUE TO PT UP IN B/S CHAIR WITH NURSE REQUEST FOR NO SIT <> STAND NOR T/F'S DUE TO LOW H&H.    Functional Mobility/Transfers:  NT DUE  "TO PT UP IN B/S CHAIR WITH NURSE REQUEST FOR NO SIT <> STAND NOR T/F'S DUE TO LOW H&H.    Activities of Daily Living:  · Grooming: stand by assistance WITH MOUTH RINSE AND TO COMB HAIR  · Upper Body Dressing: moderate assistance DUE TO MULTIPLE ICU LINES  · Lower Body Dressing: total assistance DUE TO C/O ABDOMINAL PAIN WITH BELOW WAIST AX.    Cognitive/Visual Perceptual:  Cognitive/Psychosocial Skills:     -       Oriented to: Person, Place, Time and Situation   -       Follows Commands/attention:Follows multistep  commands  -       Communication: clear/fluent  -       Mood/Affect/Coping skills/emotional control: Appropriate to situation    Physical Exam:  Upper Extremity Range of Motion:     -       Right Upper Extremity: WFL      -       Left Upper Extremity: WFL             AMPAC 6 Click ADL:  AMPAC Total Score: 14    Treatment & Education:  EVAL COMPLETED TODAY.  PT WAS EDUCATED RE: PURPOSE OF OT WITH PLANS TO ADDRESS OVERALL STRENGTH & ENDURANCE & TO INCREASE SAFETY/(I) WITH ALL LEVELS OF SELF CARE.  Education:    Patient left up in chair with all lines intact, call button in reach and NURSE notified    Assessment:     Yan Choudhury is a 63 y.o. male with a medical diagnosis of Ischemic cardiomyopathy.  He presents with the following performance deficits affecting function: weakness, impaired endurance, impaired self care skills, impaired functional mobilty, impaired balance.      Rehab Prognosis: Good; patient would benefit from acute skilled OT services to address these deficits and reach maximum level of function.         Clinical Decision Making:     3.  OT High:  "Pt evaluation falls under high complexity for evaluation category due to 5+ performance deficits identified with comprehensive assessments and significant modifications/assistance required. An expanded review of history and occupational profile completed in addition to expanded review of physical, cognitive and psychosocial history. Several " "treatment options considered in care."     Plan:     Patient to be seen 3 x/week to address the above listed problems via self-care/home management, therapeutic activities, therapeutic exercises  · Plan of Care Expires: 11/08/18  · Plan of Care Reviewed with: patient    This Plan of care has been discussed with the patient who was involved in its development and understands and is in agreement with the identified goals and treatment plan    GOALS:   Multidisciplinary Problems     Occupational Therapy Goals        Problem: Occupational Therapy Goal    Goal Priority Disciplines Outcome Interventions   Occupational Therapy Goal     OT, PT/OT Ongoing (interventions implemented as appropriate)                    Time Tracking:     OT Date of Treatment: 11/01/18  OT Start Time: 1041  OT Stop Time: 1056  OT Total Time (min): 15 min    Billable Minutes:Evaluation 15    Rajani Rainey OT  11/1/2018    "

## 2018-11-01 NOTE — PLAN OF CARE
Problem: Patient Care Overview  Goal: Plan of Care Review  Outcome: Ongoing (interventions implemented as appropriate)  No acute changes over night, pt remains AAOx4, VSS, off of Levophed gtt, UOP adequate, V-paced on monitor, and TMAX 99.2. Pt on 2 lpm NC and CPAP at HS but only tolerates CPAP for a few hours through the night. Pt's KINDRA drain w/ a total of 35 ml output over 12 hrs. Pt's incision site remains CDI w/ old drainage noted/marked and no new drainage noted. Abdomen remains soft and non distended. Pt's H/H dropped significantly this AM and spoke w/ Dr. Rodriguez informing her of decrease in H/H and a repeat CBC was ordered and awaiting results. Pt turned q2 hrs w/ heels floated. POC discussed w/ pt and pt's spouse.

## 2018-11-01 NOTE — ASSESSMENT & PLAN NOTE
1. BROOKE on CKD stage 4 :  Patient's baseline serum creatinine about 2.5 mg/dL, acute kidney injury on chronic kidney disease due to hemodynamic reasons following major abdominal surgery, creatinine improved from 3.5 to 3.2 mg/dl, reduce IV fluid rate to 50 ml/hr due to cardiomyopathy ,      will hold Bumex and metolazone ( home meds ) at this time, one dose of IV Lasix between 2 units pRBC transfusion ,     2.  Hypertension - BP controlled,      3.  Anemia - multifactorial, PRBC transfusion today ,     4.  Cardiomyopathy - was on diuretics prior to admission to the hospital will hold these at this time, can be restarted when indicated,  was on Bumex 2 mg twice a day and metolazone 2.5 mg 3 times a week on Monday Wednesday Friday,     5. Leukocytosis - abx per surgery service, [facility] ,     6. Stable lytes

## 2018-11-01 NOTE — PROGRESS NOTES
Spoke w/ Dr. Rodriguez, informed that pt's H/H dropped this morning to 6.6/20.3 from 9.8/29.4, WBC also dropped from 30.85 to 14.64, and platelets from 182 to 86. Pt's CMP results similar to yesterdays. Per MD will redraw CBC to verify results.

## 2018-11-02 LAB
ANION GAP SERPL CALC-SCNC: 10 MMOL/L
BASOPHILS # BLD AUTO: 0.02 K/UL
BASOPHILS NFR BLD: 0.1 %
BLD PROD TYP BPU: NORMAL
BLD PROD TYP BPU: NORMAL
BLOOD UNIT EXPIRATION DATE: NORMAL
BLOOD UNIT EXPIRATION DATE: NORMAL
BLOOD UNIT TYPE CODE: 6200
BLOOD UNIT TYPE CODE: 6200
BLOOD UNIT TYPE: NORMAL
BLOOD UNIT TYPE: NORMAL
BUN SERPL-MCNC: 44 MG/DL
CALCIUM SERPL-MCNC: 9.2 MG/DL
CHLORIDE SERPL-SCNC: 104 MMOL/L
CO2 SERPL-SCNC: 24 MMOL/L
CODING SYSTEM: NORMAL
CODING SYSTEM: NORMAL
CREAT SERPL-MCNC: 2.3 MG/DL
DIFFERENTIAL METHOD: ABNORMAL
DISPENSE STATUS: NORMAL
DISPENSE STATUS: NORMAL
EOSINOPHIL # BLD AUTO: 0.1 K/UL
EOSINOPHIL NFR BLD: 0.4 %
ERYTHROCYTE [DISTWIDTH] IN BLOOD BY AUTOMATED COUNT: 15.8 %
EST. GFR  (AFRICAN AMERICAN): 33 ML/MIN/1.73 M^2
EST. GFR  (NON AFRICAN AMERICAN): 29 ML/MIN/1.73 M^2
GLUCOSE SERPL-MCNC: 127 MG/DL
HCT VFR BLD AUTO: 28.6 %
HGB BLD-MCNC: 9.6 G/DL
LYMPHOCYTES # BLD AUTO: 1.2 K/UL
LYMPHOCYTES NFR BLD: 5.8 %
MAGNESIUM SERPL-MCNC: 1.5 MG/DL
MCH RBC QN AUTO: 29.3 PG
MCHC RBC AUTO-ENTMCNC: 33.6 G/DL
MCV RBC AUTO: 87 FL
MONOCYTES # BLD AUTO: 1 K/UL
MONOCYTES NFR BLD: 4.9 %
NEUTROPHILS # BLD AUTO: 18.3 K/UL
NEUTROPHILS NFR BLD: 88.8 %
PHOSPHATE SERPL-MCNC: 2.9 MG/DL
PLATELET # BLD AUTO: 125 K/UL
PMV BLD AUTO: 11.9 FL
POCT GLUCOSE: 125 MG/DL (ref 70–110)
POCT GLUCOSE: 126 MG/DL (ref 70–110)
POCT GLUCOSE: 142 MG/DL (ref 70–110)
POCT GLUCOSE: 155 MG/DL (ref 70–110)
POTASSIUM SERPL-SCNC: 3.5 MMOL/L
RBC # BLD AUTO: 3.28 M/UL
SODIUM SERPL-SCNC: 138 MMOL/L
TRANS ERYTHROCYTES VOL PATIENT: NORMAL ML
TRANS ERYTHROCYTES VOL PATIENT: NORMAL ML
WBC # BLD AUTO: 20.64 K/UL

## 2018-11-02 PROCEDURE — 25000003 PHARM REV CODE 250: Performed by: INTERNAL MEDICINE

## 2018-11-02 PROCEDURE — 80048 BASIC METABOLIC PNL TOTAL CA: CPT

## 2018-11-02 PROCEDURE — A4216 STERILE WATER/SALINE, 10 ML: HCPCS | Performed by: ANESTHESIOLOGY

## 2018-11-02 PROCEDURE — 25000003 PHARM REV CODE 250: Performed by: SURGERY

## 2018-11-02 PROCEDURE — 21400001 HC TELEMETRY ROOM

## 2018-11-02 PROCEDURE — 36430 TRANSFUSION BLD/BLD COMPNT: CPT

## 2018-11-02 PROCEDURE — P9021 RED BLOOD CELLS UNIT: HCPCS

## 2018-11-02 PROCEDURE — 83735 ASSAY OF MAGNESIUM: CPT

## 2018-11-02 PROCEDURE — 99900035 HC TECH TIME PER 15 MIN (STAT)

## 2018-11-02 PROCEDURE — 85025 COMPLETE CBC W/AUTO DIFF WBC: CPT

## 2018-11-02 PROCEDURE — 63600175 PHARM REV CODE 636 W HCPCS: Performed by: SURGERY

## 2018-11-02 PROCEDURE — 97110 THERAPEUTIC EXERCISES: CPT

## 2018-11-02 PROCEDURE — 96372 THER/PROPH/DIAG INJ SC/IM: CPT

## 2018-11-02 PROCEDURE — 25000003 PHARM REV CODE 250: Performed by: ANESTHESIOLOGY

## 2018-11-02 PROCEDURE — 97116 GAIT TRAINING THERAPY: CPT

## 2018-11-02 PROCEDURE — 94799 UNLISTED PULMONARY SVC/PX: CPT

## 2018-11-02 PROCEDURE — 84100 ASSAY OF PHOSPHORUS: CPT

## 2018-11-02 PROCEDURE — 63600175 PHARM REV CODE 636 W HCPCS: Performed by: INTERNAL MEDICINE

## 2018-11-02 PROCEDURE — 99233 SBSQ HOSP IP/OBS HIGH 50: CPT | Mod: ,,, | Performed by: INTERNAL MEDICINE

## 2018-11-02 RX ORDER — DEXTROSE MONOHYDRATE AND SODIUM CHLORIDE 5; .9 G/100ML; G/100ML
INJECTION, SOLUTION INTRAVENOUS CONTINUOUS
Status: DISCONTINUED | OUTPATIENT
Start: 2018-11-02 | End: 2018-11-03

## 2018-11-02 RX ADMIN — Medication 3 ML: at 06:11

## 2018-11-02 RX ADMIN — Medication 3 ML: at 03:11

## 2018-11-02 RX ADMIN — CHLORHEXIDINE GLUCONATE 10 ML: 1.2 RINSE ORAL at 09:11

## 2018-11-02 RX ADMIN — CARVEDILOL 12.5 MG: 12.5 TABLET, FILM COATED ORAL at 09:11

## 2018-11-02 RX ADMIN — Medication 3 ML: at 09:11

## 2018-11-02 RX ADMIN — ONDANSETRON 8 MG: 8 TABLET, ORALLY DISINTEGRATING ORAL at 06:11

## 2018-11-02 RX ADMIN — ENOXAPARIN SODIUM 30 MG: 100 INJECTION SUBCUTANEOUS at 05:11

## 2018-11-02 RX ADMIN — DEXTROSE AND SODIUM CHLORIDE: 5; .9 INJECTION, SOLUTION INTRAVENOUS at 05:11

## 2018-11-02 RX ADMIN — ACETAMINOPHEN 1000 MG: 500 TABLET, FILM COATED ORAL at 09:11

## 2018-11-02 RX ADMIN — ACETAMINOPHEN 1000 MG: 500 TABLET, FILM COATED ORAL at 03:11

## 2018-11-02 RX ADMIN — BIMATOPROST 1 DROP: 0.1 SOLUTION/ DROPS OPHTHALMIC at 09:11

## 2018-11-02 RX ADMIN — ALLOPURINOL 100 MG: 100 TABLET ORAL at 08:11

## 2018-11-02 RX ADMIN — FUROSEMIDE 60 MG: 10 INJECTION, SOLUTION INTRAMUSCULAR; INTRAVENOUS at 08:11

## 2018-11-02 RX ADMIN — CARVEDILOL 12.5 MG: 12.5 TABLET, FILM COATED ORAL at 08:11

## 2018-11-02 RX ADMIN — PANTOPRAZOLE SODIUM 40 MG: 40 TABLET, DELAYED RELEASE ORAL at 09:11

## 2018-11-02 RX ADMIN — CHLORHEXIDINE GLUCONATE 10 ML: 1.2 RINSE ORAL at 08:11

## 2018-11-02 RX ADMIN — SIMVASTATIN 10 MG: 5 TABLET, FILM COATED ORAL at 09:11

## 2018-11-02 RX ADMIN — INSULIN DETEMIR 10 UNITS: 100 INJECTION, SOLUTION SUBCUTANEOUS at 08:11

## 2018-11-02 NOTE — PROGRESS NOTES
[facility] -   General Surgery  Progress Note    Subjective:     History of Present Illness:     Patient underwent a sigmoid resection for perforated diverticulitis.  He has had a colostomy since his surgery. Patient has developed a parastomal hernia.     The patient recently had some issues with his colostomy.  He was seen in the office.     The patient was evaluated by Cardiology was found to have a moderate to high risk of complications from a cardiac point of view with colostomy reversal     An echocardiogram showed an ejection fraction of 30-35%.  But there was decreased ventricular function.    Patient was also found to have a mass in the proximal transverse colon     He now presents for colostomy reversal, and resection of the colon mass           Post-Op Info:  Procedure(s) (LRB):  REVISION OR CLOSURE, COLOSTOMY (N/A)  HEMICOLECTOMY, RIGHT (Right)  MOBILIZATION, SPLENIC FLEXURE (N/A)  CREATION, ILEOSTOMY (N/A)   3 Days Post-Op     Interval History: c/o heartburn, belching. No nausea. No flatus.     Medications:  Continuous Infusions:  Scheduled Meds:   acetaminophen  1,000 mg Oral Q8H    allopurinol  100 mg Oral Daily    bimatoprost  1 drop Both Eyes QHS    carvedilol  12.5 mg Oral BID    chlorhexidine  10 mL Mouth/Throat BID    enoxaparin  30 mg Subcutaneous Daily    insulin detemir U-100  10 Units Subcutaneous Daily    nozaseptin   Each Nare BID    pantoprazole  40 mg Oral QHS    simvastatin  10 mg Oral QHS    sodium chloride 0.9%  3 mL Intravenous Q8H     PRN Meds:sodium chloride, dextrose 50%, diphenhydrAMINE, glucagon (human recombinant), HYDROmorphone, HYDROmorphone, insulin aspart U-100, metoclopramide HCl, metoclopramide HCl, morphine, ondansetron, oxyCODONE, sodium chloride 0.9%, sodium chloride 0.9%     Review of patient's allergies indicates:  No Known Allergies  Objective:     Vital Signs (Most Recent):  Temp: 98 °F (36.7 °C) (11/02/18 0726)  Pulse: 94 (11/02/18  0726)  Resp: 17 (11/02/18 0726)  BP: 127/74 (11/02/18 0726)  SpO2: 95 % (11/02/18 0800) Vital Signs (24h Range):  Temp:  [96.6 °F (35.9 °C)-98.8 °F (37.1 °C)] 98 °F (36.7 °C)  Pulse:  [] 94  Resp:  [17-20] 17  SpO2:  [93 %-100 %] 95 %  BP: (105-127)/(56-80) 127/74     Weight: 93.8 kg (206 lb 12.7 oz)  Body mass index is 30.99 kg/m².    Intake/Output - Last 3 Shifts       10/31 0700 - 11/01 0659 11/01 0700 - 11/02 0659 11/02 0700 - 11/03 0659    P.O. 180 90     I.V. (mL/kg) 2134.7 (22.8) 1154 (12.3)     Blood 500 283.3 458.3    IV Piggyback 500      Total Intake(mL/kg) 3314.7 (35.3) 1527.3 (16.3) 458.3 (4.9)    Urine (mL/kg/hr) 930 (0.4) 1975 (0.9)     Drains 135 45     Other  40     Blood       Total Output 1065 2060     Net +2249.7 -532.7 +458.3                 Physical Exam   Constitutional: He is oriented to person, place, and time. He appears well-developed and well-nourished.   HENT:   Head: Normocephalic and atraumatic.   Eyes: EOM are normal.   Cardiovascular: Normal rate and regular rhythm.   Pulmonary/Chest: Effort normal. No respiratory distress.   Abdominal: Soft. He exhibits distension. There is tenderness.   Incisions c/d/i. Wound vac in place to stoma site. Ileostomy is pink, viable.    Musculoskeletal: Normal range of motion.   Neurological: He is alert and oriented to person, place, and time.   Skin: Skin is warm and dry.   Psychiatric: He has a normal mood and affect. Thought content normal.   Vitals reviewed.      Significant Labs:  CBC:   Recent Labs   Lab 11/02/18  0930   WBC 20.64*   RBC 3.28*   HGB 9.6*   HCT 28.6*   *   MCV 87   MCH 29.3   MCHC 33.6     CMP:   Recent Labs   Lab 11/01/18  0350   *  184*   CALCIUM 7.9*  7.9*   ALBUMIN 2.6*   PROT 5.4*     136   K 3.9  3.9   CO2 22*  22*     106   BUN 52*  52*   CREATININE 3.2*  3.2*   ALKPHOS 37*   ALT 9*   AST 24   BILITOT 0.6       Significant Diagnostics:  I have reviewed all pertinent imaging  results/findings within the past 24 hours.    Assessment/Plan:     * Ischemic cardiomyopathy    monitor         BROOEK (acute kidney injury)    Acute on chronic  Nephology has been consulted     History of colostomy reversal    Colostomy has been reversed, air leak on proctoscopy, protective ileostomy in place     Parastomal hernia without obstruction or gangrene    Parastomal hernia was closed at the time of colostomy reversal.  Local wound care.     Chronic combined systolic and diastolic congestive heart failure    Monitor       Colostomy in place    Colostomy has now been reversed  Wound care to colostomy site, wound care consult  Await return of bowel function  Ambulate     Anemia    Combination of acute blood loss and chronic disease and dilutional    Will transfuse 2 prbc and check labs    Risks of blood discussed, hepatitis and hiv, very rare, transfusion reaction     S/P partial colectomy    Postop day 2 from a right hemicolectomy, colostomy reversal protective ileostomy.  Monitor ileostomy.     Biventricular ICD (implantable cardioverter-defibrillator) in place    Monitor with telemetry     CAD, multiple vessel    Continue to monitor     CKD (chronic kidney disease) stage 4, GFR 15-29 ml/min    Nephrology consultation continue to monitor     LBBB (left bundle branch block)    Monitor with telemetry     Hyperlipidemia associated with type 2 diabetes mellitus    Continue home medications for hyperlipidemia.  Insulin sliding scale for diabetes     Pulmonary HTN    Monitor     Hypertension associated with diabetes    Continue antihypertensives, nephrology has been consulted and they will address these as they deem appropriate         Angeli Stevenson PA-C  General Surgery  [facility]

## 2018-11-02 NOTE — ASSESSMENT & PLAN NOTE
Colostomy has now been reversed  Wound care to colostomy site, wound care consult  Await return of bowel function  Ambulate

## 2018-11-02 NOTE — PLAN OF CARE
Problem: Patient Care Overview  Goal: Plan of Care Review  Outcome: Ongoing (interventions implemented as appropriate)  POC discussed w/patient, verbalized understanding. NSR on monitor.   VSS. AAO X 4. Voids per wayne hanging to gravity.   IJ intact. Medications administered as prescribed.   First unit of blood currently in process of transfusing.   Patient turns independently in bed. Ileostomy to RLQ clean, dry, and intact.   Midline abdominal incision dressing clean, dry, and intact.   Wound Vac to LLQ to suction clean, dry, and intact.     Fall precautions in place, call bell in reach, bed in low and locked position, spouse at bedside.   Patient remains free from falls/injuries.   Patient denies needs at this time.   Will continue to monitor.

## 2018-11-02 NOTE — NURSING
Blood is currently infusing. Cannot draw labs at this time. Will informed day shift nurse to draw labs after second unit is finished.

## 2018-11-02 NOTE — ASSESSMENT & PLAN NOTE
1. BROOKE on CKD stage 4 :  Patient's baseline serum creatinine about 2.5 mg/dL, acute kidney injury on chronic kidney disease due to hemodynamic reasons following major abdominal surgery, creatinine improved from 3.5 to 3.2 mg/dl, reduce IV fluid rate to 50 ml/hr due to cardiomyopathy ,      will hold Bumex and metolazone ( home meds ) at this time, one dose of IV Lasix between 2 units pRBC transfusion ,     2.  Hypertension - BP controlled,      3.  Anemia - multifactorial, PRBC transfusion today ,     4.  Cardiomyopathy - was on diuretics prior to admission to the hospital will hold these at this time, can be restarted when indicated,  was on Bumex 2 mg twice a day and metolazone 2.5 mg 3 times a week on Monday Wednesday Friday,     5. Leukocytosis - abx per surgery service, [facility] ,     6. Stable lytes

## 2018-11-02 NOTE — NURSING
Called lab blood bank to get update on blood from [facility].  Blood has been here since 11/2/18 2300.

## 2018-11-02 NOTE — PHYSICIAN QUERY
PT Name: Yan Choudhury  MR #: [id number]  Physician Query Form - Renal Condition Clarification     CDS/: Brandy E Capley               Contact information: spectralink:  [phone]    This form is a permanent document in the medical record.     QueryDate: November 2, 2018    By submitting this query, we are merely seeking further clarification of documentation. Please utilize your independent clinical judgment when addressing the question(s) below.    The Medical record contains the following:   Indicator Supporting Clinical Findings Location in Medical Record    Kidney (Renal) Insufficiency     X Kidney (Renal) Failure / Injury BROOKE (acute kidney injury)  1. BROOKE on CKD stage 4 :  Patient's baseline serum creatinine about 2.5 mg/dL, acute kidney injury on chronic kidney disease due to hemodynamic reasons following major abdominal surgery, patient was hypotensive, blood pressure improved with IV fluids and pressor support, patient is nonoliguric, will follow his renal function closely, will stop Bumex and metolazone while receiving IV fluids,     2.  Hypotension - continue IV fluids, pressor support, monitor for fluid status closely as patient has cardiomyopathy with low LV ejection fraction,      3.  Anemia - multifactorial, PRBC transfusion when indicated    Vital Signs (24h Range):  BP: ()/(38-99) 123/53   Nephrology consult 10/31    Nephrotoxic Agents     X BUN/Creatinine GFR BUN, Bld: 38 (H)  Creatinine: 2.5 (H)  eGFR if African American: 30 (A)    BUN, Bld: 46 (H)  Creatinine: 3.1 (H)  eGFR if : 23 (A)    BUN, Bld: 50 (H)  Creatinine: 3.4 (H)  eGFR if : 21 (A)    BUN, Bld: 52 (H)  Creatinine: 3.2 (H)  eGFR if : 22 (A)    BUN, Bld: 52 (H)  Creatinine: 3.2 (H)  eGFR if : 22 (A)   Labs 10/30/2018 15:30        Labs 10/31/2018 03:40        Labs 10/31/2018 11:14        Labs 11/1/2018 03:50        Labs 11/1/2018 03:50    Urine: Casts          Eosinophils      Dehydration      Nausea/Vomiting      Dialysis/CRRT      Treatment:      Other:        Acute Kidney Injury / Acute Renal Failure has different defining criteria. A generally accepted guideline  is:   A greater than 100% (2X) rise in serum creatinine from baseline* occurring during the course of a single hospital stay.   *Baseline as determined by the providers judgment and consideration of previous lab values and other documentation, if available.    A diagnosis of Acute Kidney Injury/ Acute Renal Failure should incorporate abnormal labs and clinical findings that are clinically significant      References: 1. Noah et al. Acute renal failure-definition, outcome measures, animal models, fluid therapy and information technology needs: the Second International Consensus Conference of the Acute Dialysis Quality Initiative (ADQI) Group. Crit Care 2004; 8:B204; 2. Pippa et al. Acute Kidney Injury Network: report of an initiative to improve outcomes in acute kidney injury. Crit Care 2007; 11:R31; 3. Kidney Disease: Improving Global Outcomes (KDIGO). Acute Kidney Injury Work Group. KDIGO clinical practice guidelines for acute kidney injury. Kidney Int Suppl 2012; 2:1.    The clinical guidelines noted below is only a system guideline, it does not replace the providers clinical judgment.    Provider, please specify the diagnosis or diagnoses associated with above clinical findings.    [ x   ]  Acute Kidney Failure/Injury with Tubular Necrosis  Damage to the tubule cells of the kidney. Common triggers: shock, hypotension, IV contrast, rhabdomyolysis, medications  [    ]  Unspecified Acute Kidney Failure/Injury     [    ]  Other (please specify): _______________________________  [    ]  Clinically Undetermined    Please document in your progress notes daily for the duration of treatment until resolved and include in your discharge summary.

## 2018-11-02 NOTE — SUBJECTIVE & OBJECTIVE
"Interval History: Pt was seen and examined. H/o reviewed. Pt is a 64 y/o male with BROOKE on CKD post abdominal surgery (hemicolectomy with revision colostomy). Pt has CHF with EF of 30%. Lasix was on hold due to BROOKE. Noted re-started lasix IV with large UOP resulting. Pt has no SOB, leg swelling "better", no new c/o's, stable last pm.    Review of patient's allergies indicates:  No Known Allergies  Current Facility-Administered Medications   Medication Frequency    0.9%  NaCl infusion (for blood administration) Q24H PRN    acetaminophen tablet 1,000 mg Q8H    allopurinol tablet 100 mg Daily    bimatoprost 0.01 % ophthalmic drops 1 drop QHS    carvedilol tablet 12.5 mg BID    chlorhexidine 0.12 % solution 10 mL BID    dextrose 50% injection 12.5 g PRN    diphenhydrAMINE capsule 25 mg PRN    enoxaparin injection 30 mg Daily    glucagon (human recombinant) injection 1 mg PRN    HYDROmorphone injection 1 mg Q6H PRN    HYDROmorphone injection Syrg 0.5 mg Q1H PRN    insulin aspart U-100 pen 1-10 Units Q6H PRN    insulin detemir U-100 pen 10 Units Daily    metoclopramide HCl injection 10 mg Q10 Min PRN    metoclopramide HCl injection 5 mg Q6H PRN    morphine injection 2 mg Q5 Min PRN    nozaseptin (NOZIN) nasal  BID    ondansetron disintegrating tablet 8 mg Q8H PRN    oxyCODONE immediate release tablet 5 mg Q3H PRN    pantoprazole EC tablet 40 mg QHS    simvastatin tablet 10 mg QHS    sodium chloride 0.9% flush 3 mL Q8H    sodium chloride 0.9% flush 3 mL PRN    sodium chloride 0.9% flush 5 mL PRN       Objective:     Vital Signs (Most Recent):  Temp: 97.9 °F (36.6 °C) (11/02/18 1151)  Pulse: 87 (11/02/18 1151)  Resp: 18 (11/02/18 1151)  BP: 112/67 (11/02/18 1151)  SpO2: 98 % (11/02/18 1151)  O2 Device (Oxygen Therapy): room air (11/02/18 0800) Vital Signs (24h Range):  Temp:  [96.6 °F (35.9 °C)-98.8 °F (37.1 °C)] 97.9 °F (36.6 °C)  Pulse:  [] 87  Resp:  [17-20] 18  SpO2:  [93 %-100 %] " 98 %  BP: (105-127)/(61-80) 112/67     Weight: 93.8 kg (206 lb 12.7 oz) (11/02/18 0700)  Body mass index is 30.99 kg/m².  Body surface area is 2.13 meters squared.    I/O last 3 completed shifts:  In: 3310.3 [P.O.:270; I.V.:2257; Blood:783.3]  Out: 2740 [Urine:2620; Drains:80; Other:40]    Physical Exam   Constitutional: He appears well-developed and well-nourished. No distress.   HENT:   Head: Normocephalic and atraumatic.   Neck: No JVD present.   Cardiovascular: Normal rate, regular rhythm and normal heart sounds. Exam reveals no friction rub.   Pulmonary/Chest: Effort normal and breath sounds normal. He has no rales.   Abdominal: Soft. Bowel sounds are normal.   Musculoskeletal: He exhibits edema.   Skin: Skin is warm and dry.   Psychiatric: He has a normal mood and affect. His behavior is normal.   Nursing note and vitals reviewed.      Significant Labs: reviewed  BMP  Lab Results   Component Value Date     11/02/2018    K 3.5 11/02/2018     11/02/2018    CO2 24 11/02/2018    BUN 44 (H) 11/02/2018    CREATININE 2.3 (H) 11/02/2018    CALCIUM 9.2 11/02/2018    ANIONGAP 10 11/02/2018    ESTGFRAFRICA 33 (A) 11/02/2018    EGFRNONAA 29 (A) 11/02/2018     Lab Results   Component Value Date    WBC 20.64 (H) 11/02/2018    HGB 9.6 (L) 11/02/2018    HCT 28.6 (L) 11/02/2018    MCV 87 11/02/2018     (L) 11/02/2018         Significant Imaging: CXR reviewed

## 2018-11-02 NOTE — PROGRESS NOTES
Follow up visit with Serenity Kei for continued Ileostomy care.  Written Ileostomy education left at bedside.  Patient currently sitting up in chair.  Wound vac functioning well with old colostomy site with good seal noted. Plan to change dressing Monday.  Will continue to follow.

## 2018-11-02 NOTE — PLAN OF CARE
Problem: Physical Therapy Goal  Goal: Physical Therapy Goal  LTG'S TO BE MET IN 7 DAYS (11-8-18)  1. PT WILL REQUIRE SBA FOR BED MOBILITY  2. PT WILL REQUIRE NATI FOR TF'S  3. PT WILL ' WITH RW AND NATI  4. PT WILL DEMO F DYNAMIC BALANCE DURING GAIT   Outcome: Ongoing (interventions implemented as appropriate)  PT WITH GOOD EFFORT AND PARTICIPATION, NATI FOR BED MOBILITY AND TF, NATI FOR GAIT WITH RW

## 2018-11-02 NOTE — NURSING
Lab called. Blood bank  currently in a code. Name and number to be reached at with . Waiting for call to be returned.

## 2018-11-02 NOTE — SUBJECTIVE & OBJECTIVE
Interval History: c/o heartburn, belching. No nausea. No flatus.     Medications:  Continuous Infusions:  Scheduled Meds:   acetaminophen  1,000 mg Oral Q8H    allopurinol  100 mg Oral Daily    bimatoprost  1 drop Both Eyes QHS    carvedilol  12.5 mg Oral BID    chlorhexidine  10 mL Mouth/Throat BID    enoxaparin  30 mg Subcutaneous Daily    insulin detemir U-100  10 Units Subcutaneous Daily    nozaseptin   Each Nare BID    pantoprazole  40 mg Oral QHS    simvastatin  10 mg Oral QHS    sodium chloride 0.9%  3 mL Intravenous Q8H     PRN Meds:sodium chloride, dextrose 50%, diphenhydrAMINE, glucagon (human recombinant), HYDROmorphone, HYDROmorphone, insulin aspart U-100, metoclopramide HCl, metoclopramide HCl, morphine, ondansetron, oxyCODONE, sodium chloride 0.9%, sodium chloride 0.9%     Review of patient's allergies indicates:  No Known Allergies  Objective:     Vital Signs (Most Recent):  Temp: 98 °F (36.7 °C) (11/02/18 0726)  Pulse: 94 (11/02/18 0726)  Resp: 17 (11/02/18 0726)  BP: 127/74 (11/02/18 0726)  SpO2: 95 % (11/02/18 0800) Vital Signs (24h Range):  Temp:  [96.6 °F (35.9 °C)-98.8 °F (37.1 °C)] 98 °F (36.7 °C)  Pulse:  [] 94  Resp:  [17-20] 17  SpO2:  [93 %-100 %] 95 %  BP: (105-127)/(56-80) 127/74     Weight: 93.8 kg (206 lb 12.7 oz)  Body mass index is 30.99 kg/m².    Intake/Output - Last 3 Shifts       10/31 0700 - 11/01 0659 11/01 0700 - 11/02 0659 11/02 0700 - 11/03 0659    P.O. 180 90     I.V. (mL/kg) 2134.7 (22.8) 1154 (12.3)     Blood 500 283.3 458.3    IV Piggyback 500      Total Intake(mL/kg) 3314.7 (35.3) 1527.3 (16.3) 458.3 (4.9)    Urine (mL/kg/hr) 930 (0.4) 1975 (0.9)     Drains 135 45     Other  40     Blood       Total Output 1065 2060     Net +2249.7 -532.7 +458.3                 Physical Exam   Constitutional: He is oriented to person, place, and time. He appears well-developed and well-nourished.   HENT:   Head: Normocephalic and atraumatic.   Eyes: EOM are normal.    Cardiovascular: Normal rate and regular rhythm.   Pulmonary/Chest: Effort normal. No respiratory distress.   Abdominal: Soft. He exhibits distension. There is tenderness.   Incisions c/d/i. Wound vac in place to stoma site. Ileostomy is pink, viable.    Musculoskeletal: Normal range of motion.   Neurological: He is alert and oriented to person, place, and time.   Skin: Skin is warm and dry.   Psychiatric: He has a normal mood and affect. Thought content normal.   Vitals reviewed.      Significant Labs:  CBC:   Recent Labs   Lab 11/02/18  0930   WBC 20.64*   RBC 3.28*   HGB 9.6*   HCT 28.6*   *   MCV 87   MCH 29.3   MCHC 33.6     CMP:   Recent Labs   Lab 11/01/18  0350   *  184*   CALCIUM 7.9*  7.9*   ALBUMIN 2.6*   PROT 5.4*     136   K 3.9  3.9   CO2 22*  22*     106   BUN 52*  52*   CREATININE 3.2*  3.2*   ALKPHOS 37*   ALT 9*   AST 24   BILITOT 0.6       Significant Diagnostics:  I have reviewed all pertinent imaging results/findings within the past 24 hours.

## 2018-11-02 NOTE — NURSING
Blood bank tech received a call from [facility] saying they have 2 compatible units available and they will be sent over to [facility] shortly. Blood bank tech will call me when they are ready.

## 2018-11-02 NOTE — PROGRESS NOTES
"[facility]  Nephrology  Progress Note    Patient Name: Yan Choudhury  MRN: [id number]  Admission Date: 10/30/2018  Hospital Length of Stay: 3 days  Attending Provider: Kevin Lindsay MD   Primary Care Physician: Sandra Estevez MD  Principal Problem:Ischemic cardiomyopathy, BROOKE on CKD    Subjective:     HPI: Yan Choudhury is a pleasant 63-year-old  gentleman with history of congestive heart failure, CAD, cardiomyopathy, LV ejection fraction about 30%, CKD stage 3/4, baseline creatinine about 2.5 mg/dL, was admitted to the hospital for elective to surgery, he underwent hemicolectomy and revision colostomy, patient's serum creatinine increased from a baseline of 2.5 to 3.4 mg/dL, his urine output also has been about 30 mL/hour, we were consulted for acute on chronic kidney failure, patient was hypotensive following his surgery requiring pressor support.    Interval History: Pt was seen and examined. H/o reviewed. Pt is a 62 y/o male with BROOKE on CKD post abdominal surgery (hemicolectomy with revision colostomy). Pt has CHF with EF of 30%. Lasix was on hold due to BROOKE. Noted re-started lasix IV with large UOP resulting. Pt has no SOB, leg swelling "better", no new c/o's, stable last pm.    Review of patient's allergies indicates:  No Known Allergies  Current Facility-Administered Medications   Medication Frequency    0.9%  NaCl infusion (for blood administration) Q24H PRN    acetaminophen tablet 1,000 mg Q8H    allopurinol tablet 100 mg Daily    bimatoprost 0.01 % ophthalmic drops 1 drop QHS    carvedilol tablet 12.5 mg BID    chlorhexidine 0.12 % solution 10 mL BID    dextrose 50% injection 12.5 g PRN    diphenhydrAMINE capsule 25 mg PRN    enoxaparin injection 30 mg Daily    glucagon (human recombinant) injection 1 mg PRN    HYDROmorphone injection 1 mg Q6H PRN    HYDROmorphone injection Syrg 0.5 mg Q1H PRN    insulin aspart U-100 pen 1-10 Units Q6H PRN    insulin detemir " U-100 pen 10 Units Daily    metoclopramide HCl injection 10 mg Q10 Min PRN    metoclopramide HCl injection 5 mg Q6H PRN    morphine injection 2 mg Q5 Min PRN    nozaseptin (NOZIN) nasal  BID    ondansetron disintegrating tablet 8 mg Q8H PRN    oxyCODONE immediate release tablet 5 mg Q3H PRN    pantoprazole EC tablet 40 mg QHS    simvastatin tablet 10 mg QHS    sodium chloride 0.9% flush 3 mL Q8H    sodium chloride 0.9% flush 3 mL PRN    sodium chloride 0.9% flush 5 mL PRN       Objective:     Vital Signs (Most Recent):  Temp: 97.9 °F (36.6 °C) (11/02/18 1151)  Pulse: 87 (11/02/18 1151)  Resp: 18 (11/02/18 1151)  BP: 112/67 (11/02/18 1151)  SpO2: 98 % (11/02/18 1151)  O2 Device (Oxygen Therapy): room air (11/02/18 0800) Vital Signs (24h Range):  Temp:  [96.6 °F (35.9 °C)-98.8 °F (37.1 °C)] 97.9 °F (36.6 °C)  Pulse:  [] 87  Resp:  [17-20] 18  SpO2:  [93 %-100 %] 98 %  BP: (105-127)/(61-80) 112/67     Weight: 93.8 kg (206 lb 12.7 oz) (11/02/18 0700)  Body mass index is 30.99 kg/m².  Body surface area is 2.13 meters squared.    I/O last 3 completed shifts:  In: 3310.3 [P.O.:270; I.V.:2257; Blood:783.3]  Out: 2740 [Urine:2620; Drains:80; Other:40]    Physical Exam   Constitutional: He appears well-developed and well-nourished. No distress.   HENT:   Head: Normocephalic and atraumatic.   Neck: No JVD present.   Cardiovascular: Normal rate, regular rhythm and normal heart sounds. Exam reveals no friction rub.   Pulmonary/Chest: Effort normal and breath sounds normal. He has no rales.   Abdominal: Soft. Bowel sounds are normal.   Musculoskeletal: He exhibits edema.   Skin: Skin is warm and dry.   Psychiatric: He has a normal mood and affect. His behavior is normal.   Nursing note and vitals reviewed.      Significant Labs: reviewed  BMP  Lab Results   Component Value Date     11/02/2018    K 3.5 11/02/2018     11/02/2018    CO2 24 11/02/2018    BUN 44 (H) 11/02/2018    CREATININE 2.3  (H) 11/02/2018    CALCIUM 9.2 11/02/2018    ANIONGAP 10 11/02/2018    ESTGFRAFRICA 33 (A) 11/02/2018    EGFRNONAA 29 (A) 11/02/2018     Lab Results   Component Value Date    WBC 20.64 (H) 11/02/2018    HGB 9.6 (L) 11/02/2018    HCT 28.6 (L) 11/02/2018    MCV 87 11/02/2018     (L) 11/02/2018         Significant Imaging: CXR reviewed    Assessment/Plan:   62 y/o male with BROOKE on CKD post abdominal surgery:    BROOKE (acute kidney injury)    BROOKE on CKD stage 4: s Cr much lower today compared to yesterday  Renal failure stable, resolving  s Cr appears at baseline today (baseline s Cr 2.5 mg/d)  Cause of SAKI not clear, likely due to hemodynamic reasons following abdominal surgery  K normal  Acid base stable    Hypertension - BP controlled to low  Lower IV lasix dose    Anemia - multifactorial, s/p PRBC transfusion   Will monitor     Chronic combined systolic and diastolic congestive heart failure    Cardiac: has moderate to severe CHF.  Robust response to re-start of lasix IV  To avoid over-diuresis, suggest lower IV dose or switch to PO lasix.     Plans and recommendations:  As discussed above.    Juan Luis Galeas MD  Nephrology  [facility]

## 2018-11-02 NOTE — ASSESSMENT & PLAN NOTE
Cardiac: has moderate to severe CHF.  Robust response to re-start of lasix IV  To avoid over-diuresis, suggest lower IV dose or switch to PO lasix.

## 2018-11-02 NOTE — PT/OT/SLP PROGRESS
Physical Therapy  Treatment    Yan Choudhury   MRN: [id number]   Admitting Diagnosis: Ischemic cardiomyopathy    PT Received On: 11/02/18  PT Start Time: 0945     PT Stop Time: 1010    PT Total Time (min): 25 min       Billable Minutes:  Gait Training 15 and Therapeutic Exercise 10    Treatment Type: Treatment  PT/PTA: PT       General Precautions: Standard, fall  Orthopedic Precautions: N/A   Braces: N/A    Subjective:  Communicated with NURSE GARZA prior to session.  Pain/Comfort  Pain Rating 1: 0/10    Objective:   Patient found with: telemetry, wound vac, wayne catheter, KINDRA drain, colostomy    Functional Mobility:  Therapeutic Activities and Exercises:  PT FOUND SUPINE IN BED UPON ARRIVAL, NATI FOR SUP>SIT TF AND SEATED SCOOT TOWARD EOB WITH EXTRA TIME, NATI FOR SIT>STAND TO RW, CUES FOR UPRIGHT POSTURE, PT AMB 90' WITH RW AND NATI, SLOW PACED GAIT, QUICK TO FATIGUE, CUES FOR UPRIGHT POSTURE, POOR+ DYNAMIC BALANCE, PT RETURN TO ROOM TO BEDSIDE CHAIR WITH NATI, PT EDUCATED IN AND PERFORMED BLE THEREX X 20 REPS AROM WITH REST, PT AGREEABLE TO SIT IN CHAIR TO SPEND TIME OOB    AM-PAC 6 CLICK MOBILITY  How much help from another person does this patient currently need?   1 = Unable, Total/Dependent Assistance  2 = A lot, Maximum/Moderate Assistance  3 = A little, Minimum/Contact Guard/Supervision  4 = None, Modified [place]/Independent    Turning over in bed (including adjusting bedclothes, sheets and blankets)?: 3  Sitting down on and standing up from a chair with arms (e.g., wheelchair, bedside commode, etc.): 3  Moving from lying on back to sitting on the side of the bed?: 3  Moving to and from a bed to a chair (including a wheelchair)?: 3  Need to walk in hospital room?: 3  Climbing 3-5 steps with a railing?: 1  Basic Mobility Total Score: 16    AM-PAC Raw Score CMS G-Code Modifier Level of Impairment Assistance   6 % Total / Unable   7 - 9 CM 80 - 100% Maximal Assist   10 - 14 CL 60 - 80% Moderate  Assist   15 - 19 CK 40 - 60% Moderate Assist   20 - 22 CJ 20 - 40% Minimal Assist   23 CI 1-20% SBA / CGA   24 CH 0% Independent/ Mod I     Patient left up in chair with all lines intact, call button in reach, NURSE notified and WIFE present.    Assessment:  Yan Choudhury is a 63 y.o. male with a medical diagnosis of Ischemic cardiomyopathy and presents with IMPAIRED FUNCTIONAL MOBILITY. PT WILL BENEFIT FROM CONT. SKILLED P.T. TO ADDRESS IMPAIRMENTS    Rehab identified problem list/impairments: Rehab identified problem list/impairments: weakness, impaired functional mobilty, gait instability, impaired balance, decreased safety awareness, decreased coordination    Rehab potential is good.    Activity tolerance: Good    Discharge recommendations: Discharge Facility/Level Of Care Needs: nursing facility, skilled, home health PT     Barriers to discharge:      Equipment recommendations: Equipment Needed After Discharge: bath bench, walker, rolling     GOALS:   Multidisciplinary Problems     Physical Therapy Goals        Problem: Physical Therapy Goal    Goal Priority Disciplines Outcome Goal Variances Interventions   Physical Therapy Goal     PT, PT/OT Ongoing (interventions implemented as appropriate)     Description:  LTG'S TO BE MET IN 7 DAYS (11-8-18)  1. PT WILL REQUIRE SBA FOR BED MOBILITY  2. PT WILL REQUIRE NATI FOR TF'S  3. PT WILL ' WITH RW AND NATI  4. PT WILL DEMO F DYNAMIC BALANCE DURING GAIT                    PLAN:    Patient to be seen 5 x/week  to address the above listed problems via gait training, therapeutic activities, therapeutic exercises  Plan of Care expires: 11/09/18  Plan of Care reviewed with: patient    Ilda Gould, PT  11/02/2018

## 2018-11-03 LAB
ANION GAP SERPL CALC-SCNC: 8 MMOL/L
ANION GAP SERPL CALC-SCNC: 8 MMOL/L
BASOPHILS # BLD AUTO: 0.01 K/UL
BASOPHILS NFR BLD: 0.1 %
BUN SERPL-MCNC: 48 MG/DL
BUN SERPL-MCNC: 48 MG/DL
CALCIUM SERPL-MCNC: 8.4 MG/DL
CALCIUM SERPL-MCNC: 8.4 MG/DL
CHLORIDE SERPL-SCNC: 104 MMOL/L
CHLORIDE SERPL-SCNC: 104 MMOL/L
CO2 SERPL-SCNC: 25 MMOL/L
CO2 SERPL-SCNC: 25 MMOL/L
CREAT SERPL-MCNC: 2.1 MG/DL
CREAT SERPL-MCNC: 2.1 MG/DL
DIFFERENTIAL METHOD: ABNORMAL
EOSINOPHIL # BLD AUTO: 0.2 K/UL
EOSINOPHIL NFR BLD: 2 %
ERYTHROCYTE [DISTWIDTH] IN BLOOD BY AUTOMATED COUNT: 15.4 %
EST. GFR  (AFRICAN AMERICAN): 37 ML/MIN/1.73 M^2
EST. GFR  (AFRICAN AMERICAN): 37 ML/MIN/1.73 M^2
EST. GFR  (NON AFRICAN AMERICAN): 32 ML/MIN/1.73 M^2
EST. GFR  (NON AFRICAN AMERICAN): 32 ML/MIN/1.73 M^2
GLUCOSE SERPL-MCNC: 165 MG/DL
GLUCOSE SERPL-MCNC: 165 MG/DL
HCT VFR BLD AUTO: 28 %
HGB BLD-MCNC: 9.3 G/DL
LYMPHOCYTES # BLD AUTO: 0.8 K/UL
LYMPHOCYTES NFR BLD: 8.5 %
MCH RBC QN AUTO: 28.9 PG
MCHC RBC AUTO-ENTMCNC: 33.2 G/DL
MCV RBC AUTO: 87 FL
MONOCYTES # BLD AUTO: 0.9 K/UL
MONOCYTES NFR BLD: 8.9 %
NEUTROPHILS # BLD AUTO: 7.6 K/UL
NEUTROPHILS NFR BLD: 80.5 %
PLATELET # BLD AUTO: 130 K/UL
PMV BLD AUTO: 11 FL
POCT GLUCOSE: 143 MG/DL (ref 70–110)
POCT GLUCOSE: 151 MG/DL (ref 70–110)
POCT GLUCOSE: 158 MG/DL (ref 70–110)
POCT GLUCOSE: 171 MG/DL (ref 70–110)
POTASSIUM SERPL-SCNC: 3.6 MMOL/L
POTASSIUM SERPL-SCNC: 3.6 MMOL/L
RBC # BLD AUTO: 3.22 M/UL
SODIUM SERPL-SCNC: 137 MMOL/L
SODIUM SERPL-SCNC: 137 MMOL/L
WBC # BLD AUTO: 9.5 K/UL

## 2018-11-03 PROCEDURE — 97110 THERAPEUTIC EXERCISES: CPT

## 2018-11-03 PROCEDURE — 25000003 PHARM REV CODE 250: Performed by: SURGERY

## 2018-11-03 PROCEDURE — 80048 BASIC METABOLIC PNL TOTAL CA: CPT

## 2018-11-03 PROCEDURE — 27000221 HC OXYGEN, UP TO 24 HOURS

## 2018-11-03 PROCEDURE — 85025 COMPLETE CBC W/AUTO DIFF WBC: CPT

## 2018-11-03 PROCEDURE — 63600175 PHARM REV CODE 636 W HCPCS: Performed by: SURGERY

## 2018-11-03 PROCEDURE — A4216 STERILE WATER/SALINE, 10 ML: HCPCS | Performed by: ANESTHESIOLOGY

## 2018-11-03 PROCEDURE — 99233 SBSQ HOSP IP/OBS HIGH 50: CPT | Mod: ,,, | Performed by: INTERNAL MEDICINE

## 2018-11-03 PROCEDURE — 25000003 PHARM REV CODE 250: Performed by: INTERNAL MEDICINE

## 2018-11-03 PROCEDURE — 21400001 HC TELEMETRY ROOM

## 2018-11-03 PROCEDURE — 25000003 PHARM REV CODE 250: Performed by: ANESTHESIOLOGY

## 2018-11-03 PROCEDURE — 97116 GAIT TRAINING THERAPY: CPT

## 2018-11-03 RX ORDER — BUMETANIDE 1 MG/1
1 TABLET ORAL 2 TIMES DAILY
Status: DISCONTINUED | OUTPATIENT
Start: 2018-11-03 | End: 2018-11-04

## 2018-11-03 RX ADMIN — CHLORHEXIDINE GLUCONATE 10 ML: 1.2 RINSE ORAL at 09:11

## 2018-11-03 RX ADMIN — Medication 3 ML: at 05:11

## 2018-11-03 RX ADMIN — Medication 3 ML: at 02:11

## 2018-11-03 RX ADMIN — SIMVASTATIN 10 MG: 5 TABLET, FILM COATED ORAL at 09:11

## 2018-11-03 RX ADMIN — ALLOPURINOL 100 MG: 100 TABLET ORAL at 09:11

## 2018-11-03 RX ADMIN — CARVEDILOL 12.5 MG: 12.5 TABLET, FILM COATED ORAL at 09:11

## 2018-11-03 RX ADMIN — Medication 3 ML: at 09:11

## 2018-11-03 RX ADMIN — ACETAMINOPHEN 1000 MG: 500 TABLET, FILM COATED ORAL at 05:11

## 2018-11-03 RX ADMIN — INSULIN ASPART 2 UNITS: 100 INJECTION, SOLUTION INTRAVENOUS; SUBCUTANEOUS at 11:11

## 2018-11-03 RX ADMIN — PANTOPRAZOLE SODIUM 40 MG: 40 TABLET, DELAYED RELEASE ORAL at 09:11

## 2018-11-03 RX ADMIN — ENOXAPARIN SODIUM 30 MG: 100 INJECTION SUBCUTANEOUS at 04:11

## 2018-11-03 RX ADMIN — BUMETANIDE 1 MG: 1 TABLET ORAL at 09:11

## 2018-11-03 RX ADMIN — ACETAMINOPHEN 1000 MG: 500 TABLET, FILM COATED ORAL at 02:11

## 2018-11-03 RX ADMIN — BIMATOPROST 1 DROP: 0.1 SOLUTION/ DROPS OPHTHALMIC at 09:11

## 2018-11-03 RX ADMIN — ACETAMINOPHEN 1000 MG: 500 TABLET, FILM COATED ORAL at 10:11

## 2018-11-03 RX ADMIN — INSULIN ASPART 2 UNITS: 100 INJECTION, SOLUTION INTRAVENOUS; SUBCUTANEOUS at 04:11

## 2018-11-03 RX ADMIN — INSULIN DETEMIR 10 UNITS: 100 INJECTION, SOLUTION SUBCUTANEOUS at 09:11

## 2018-11-03 NOTE — PLAN OF CARE
Problem: Patient Care Overview  Goal: Plan of Care Review  Pt ambulated 100 feet with a Rw. Progressing.

## 2018-11-03 NOTE — PLAN OF CARE
Problem: Patient Care Overview  Goal: Plan of Care Review  Outcome: Ongoing (interventions implemented as appropriate)  Patient remain free of injury throughout shift. Patient encouraged to deep breath and use incentive spirometry while awake. Safety maintained while working with physical therapy. Pain measures continued when needed. Will continue to monitor for changes.

## 2018-11-03 NOTE — SUBJECTIVE & OBJECTIVE
Interval History: Distended, minimal ostomy output, pain controlled, working with physical therapy    Medications:  Continuous Infusions:   dextrose 5 % and 0.9 % NaCl 50 mL/hr at 11/02/18 1755     Scheduled Meds:   acetaminophen  1,000 mg Oral Q8H    allopurinol  100 mg Oral Daily    bimatoprost  1 drop Both Eyes QHS    carvedilol  12.5 mg Oral BID    chlorhexidine  10 mL Mouth/Throat BID    enoxaparin  30 mg Subcutaneous Daily    insulin detemir U-100  10 Units Subcutaneous Daily    nozaseptin   Each Nare BID    pantoprazole  40 mg Oral QHS    simvastatin  10 mg Oral QHS    sodium chloride 0.9%  3 mL Intravenous Q8H     PRN Meds:sodium chloride, dextrose 50%, diphenhydrAMINE, glucagon (human recombinant), HYDROmorphone, HYDROmorphone, insulin aspart U-100, metoclopramide HCl, metoclopramide HCl, morphine, ondansetron, oxyCODONE, sodium chloride 0.9%, sodium chloride 0.9%     Review of patient's allergies indicates:  No Known Allergies  Objective:     Vital Signs (Most Recent):  Temp: 97.7 °F (36.5 °C) (11/03/18 0756)  Pulse: 89 (11/03/18 0900)  Resp: 16 (11/03/18 0853)  BP: 124/72 (11/03/18 0756)  SpO2: 99 % (11/03/18 0853) Vital Signs (24h Range):  Temp:  [97.2 °F (36.2 °C)-99.2 °F (37.3 °C)] 97.7 °F (36.5 °C)  Pulse:  [74-94] 89  Resp:  [16-20] 16  SpO2:  [94 %-99 %] 99 %  BP: (112-124)/(61-72) 124/72     Weight: 93.8 kg (206 lb 12.7 oz)  Body mass index is 30.99 kg/m².    Intake/Output - Last 3 Shifts       11/01 0700 - 11/02 0659 11/02 0700 - 11/03 0659 11/03 0700 - 11/04 0659    P.O. 90 210     I.V. (mL/kg) 1154 (12.3) 550 (5.9)     Blood 283.3 458.3     IV Piggyback       Total Intake(mL/kg) 1527.3 (16.3) 1218.3 (13)     Urine (mL/kg/hr) 1975 (0.9) 2120 (0.9)     Drains 45 235     Other 40      Total Output 2060 2355     Net -532.7 -1136.7                  Physical Exam   Constitutional: He is oriented to person, place, and time. He appears well-developed and well-nourished.   HENT:   Head:  Normocephalic and atraumatic.   Eyes: EOM are normal.   Cardiovascular: Normal rate and regular rhythm.   Pulmonary/Chest: Effort normal. No respiratory distress.   Abdominal: Soft. He exhibits distension. There is tenderness.   Incisions c/d/i. Wound vac in place to stoma site. Ileostomy is pink, viable.    Musculoskeletal: Normal range of motion.   Neurological: He is alert and oriented to person, place, and time.   Skin: Skin is warm and dry.   Psychiatric: He has a normal mood and affect. Thought content normal.   Vitals reviewed.      Significant Labs:  CBC:   Recent Labs   Lab 11/02/18  0930   WBC 20.64*   RBC 3.28*   HGB 9.6*   HCT 28.6*   *   MCV 87   MCH 29.3   MCHC 33.6     CMP:   Recent Labs   Lab 11/01/18  0350 11/02/18  0930   *  184* 127*   CALCIUM 7.9*  7.9* 9.2   ALBUMIN 2.6*  --    PROT 5.4*  --      136 138   K 3.9  3.9 3.5   CO2 22*  22* 24     106 104   BUN 52*  52* 44*   CREATININE 3.2*  3.2* 2.3*   ALKPHOS 37*  --    ALT 9*  --    AST 24  --    BILITOT 0.6  --        Significant Diagnostics:  I have reviewed all pertinent imaging results/findings within the past 24 hours.

## 2018-11-03 NOTE — ASSESSMENT & PLAN NOTE
64 y/o male with BROOKE on CKD post abdominal surgery:         BROOKE (acute kidney injury)     BROOKE on CKD stage 4: s Cr much lower today compared to yesterday  Renal failure stable, resolving  s Cr appears at baseline today (baseline s Cr 2.5 mg/d)  Cause of SAKI not clear, likely due to hemodynamic reasons following abdominal surgery  K normal  Acid base stable     Hypertension - BP controlled to low  Lower IV lasix dose     Anemia - multifactorial, s/p PRBC transfusion   Will monitor      Chronic combined systolic and diastolic congestive heart failure     Cardiac: has moderate to severe CHF.  Robust response to re-start of lasix IV  To avoid over-diuresis, suggest lower IV dose or switch to PO lasix.      Plans and recommendations:  As discussed above.

## 2018-11-03 NOTE — PROGRESS NOTES
[facility]   General Surgery  Progress Note    Subjective:     History of Present Illness:     Patient underwent a sigmoid resection for perforated diverticulitis.  He has had a colostomy since his surgery. Patient has developed a parastomal hernia.     The patient recently had some issues with his colostomy.  He was seen in the office.     The patient was evaluated by Cardiology was found to have a moderate to high risk of complications from a cardiac point of view with colostomy reversal     An echocardiogram showed an ejection fraction of 30-35%.  But there was decreased ventricular function.    Patient was also found to have a mass in the proximal transverse colon     He now presents for colostomy reversal, and resection of the colon mass           Post-Op Info:  Procedure(s) (LRB):  REVISION OR CLOSURE, COLOSTOMY (N/A)  HEMICOLECTOMY, RIGHT (Right)  MOBILIZATION, SPLENIC FLEXURE (N/A)  CREATION, ILEOSTOMY (N/A)   4 Days Post-Op     Interval History: Distended, minimal ostomy output, pain controlled, working with physical therapy    Medications:  Continuous Infusions:   dextrose 5 % and 0.9 % NaCl 50 mL/hr at 11/02/18 1755     Scheduled Meds:   acetaminophen  1,000 mg Oral Q8H    allopurinol  100 mg Oral Daily    bimatoprost  1 drop Both Eyes QHS    carvedilol  12.5 mg Oral BID    chlorhexidine  10 mL Mouth/Throat BID    enoxaparin  30 mg Subcutaneous Daily    insulin detemir U-100  10 Units Subcutaneous Daily    nozaseptin   Each Nare BID    pantoprazole  40 mg Oral QHS    simvastatin  10 mg Oral QHS    sodium chloride 0.9%  3 mL Intravenous Q8H     PRN Meds:sodium chloride, dextrose 50%, diphenhydrAMINE, glucagon (human recombinant), HYDROmorphone, HYDROmorphone, insulin aspart U-100, metoclopramide HCl, metoclopramide HCl, morphine, ondansetron, oxyCODONE, sodium chloride 0.9%, sodium chloride 0.9%     Review of patient's allergies indicates:  No Known Allergies  Objective:      Vital Signs (Most Recent):  Temp: 97.7 °F (36.5 °C) (11/03/18 0756)  Pulse: 89 (11/03/18 0900)  Resp: 16 (11/03/18 0853)  BP: 124/72 (11/03/18 0756)  SpO2: 99 % (11/03/18 0853) Vital Signs (24h Range):  Temp:  [97.2 °F (36.2 °C)-99.2 °F (37.3 °C)] 97.7 °F (36.5 °C)  Pulse:  [74-94] 89  Resp:  [16-20] 16  SpO2:  [94 %-99 %] 99 %  BP: (112-124)/(61-72) 124/72     Weight: 93.8 kg (206 lb 12.7 oz)  Body mass index is 30.99 kg/m².    Intake/Output - Last 3 Shifts       11/01 0700 - 11/02 0659 11/02 0700 - 11/03 0659 11/03 0700 - 11/04 0659    P.O. 90 210     I.V. (mL/kg) 1154 (12.3) 550 (5.9)     Blood 283.3 458.3     IV Piggyback       Total Intake(mL/kg) 1527.3 (16.3) 1218.3 (13)     Urine (mL/kg/hr) 1975 (0.9) 2120 (0.9)     Drains 45 235     Other 40      Total Output 2060 2355     Net -532.7 -1136.7                  Physical Exam   Constitutional: He is oriented to person, place, and time. He appears well-developed and well-nourished.   HENT:   Head: Normocephalic and atraumatic.   Eyes: EOM are normal.   Cardiovascular: Normal rate and regular rhythm.   Pulmonary/Chest: Effort normal. No respiratory distress.   Abdominal: Soft. He exhibits distension. There is tenderness.   Incisions c/d/i. Wound vac in place to stoma site. Ileostomy is pink, viable.    Musculoskeletal: Normal range of motion.   Neurological: He is alert and oriented to person, place, and time.   Skin: Skin is warm and dry.   Psychiatric: He has a normal mood and affect. Thought content normal.   Vitals reviewed.      Significant Labs:  CBC:   Recent Labs   Lab 11/02/18  0930   WBC 20.64*   RBC 3.28*   HGB 9.6*   HCT 28.6*   *   MCV 87   MCH 29.3   MCHC 33.6     CMP:   Recent Labs   Lab 11/01/18  0350 11/02/18  0930   *  184* 127*   CALCIUM 7.9*  7.9* 9.2   ALBUMIN 2.6*  --    PROT 5.4*  --      136 138   K 3.9  3.9 3.5   CO2 22*  22* 24     106 104   BUN 52*  52* 44*   CREATININE 3.2*  3.2* 2.3*   ALKPHOS 37*   --    ALT 9*  --    AST 24  --    BILITOT 0.6  --        Significant Diagnostics:  I have reviewed all pertinent imaging results/findings within the past 24 hours.    Assessment/Plan:     * Ischemic cardiomyopathy    monitor         BROOKE (acute kidney injury)    Acute on chronic  Nephology has been consulted     History of colostomy reversal    Colostomy has been reversed, air leak on proctoscopy, protective ileostomy in place     Parastomal hernia without obstruction or gangrene    Parastomal hernia was closed at the time of colostomy reversal.  Local wound care.     Chronic combined systolic and diastolic congestive heart failure    Monitor       Colostomy in place    Colostomy has now been reversed  Wound care to colostomy site, wound care consult  Await return of bowel function  Ambulate     Anemia    Combination of acute blood loss and chronic disease and dilutional  Continue to monitor     S/P partial colectomy    Postop day 2 from a right hemicolectomy, colostomy reversal protective ileostomy.  Monitor ileostomy.     Biventricular ICD (implantable cardioverter-defibrillator) in place    Monitor with telemetry     CAD, multiple vessel    Continue to monitor     CKD (chronic kidney disease) stage 4, GFR 15-29 ml/min    Nephrology consultation continue to monitor     LBBB (left bundle branch block)    Monitor with telemetry     Hyperlipidemia associated with type 2 diabetes mellitus    Continue home medications for hyperlipidemia.  Insulin sliding scale for diabetes     Pulmonary HTN    Monitor     Hypertension associated with diabetes    Continue antihypertensives, nephrology has been consulted and they will address these as they deem appropriate         Ubaldo Melton MD  General Surgery  [facility]

## 2018-11-03 NOTE — SUBJECTIVE & OBJECTIVE
Interval History: Pt was seen and examined. No new issues overnight, stable last pm. Denies SOB. Noted pt is receiving NS IVF's at 50 ml/hr.    Review of patient's allergies indicates:  No Known Allergies  Current Facility-Administered Medications   Medication Frequency    0.9%  NaCl infusion (for blood administration) Q24H PRN    acetaminophen tablet 1,000 mg Q8H    allopurinol tablet 100 mg Daily    bimatoprost 0.01 % ophthalmic drops 1 drop QHS    carvedilol tablet 12.5 mg BID    chlorhexidine 0.12 % solution 10 mL BID    dextrose 5 % and 0.9 % NaCl infusion Continuous    dextrose 50% injection 12.5 g PRN    diphenhydrAMINE capsule 25 mg PRN    enoxaparin injection 30 mg Daily    glucagon (human recombinant) injection 1 mg PRN    HYDROmorphone injection 1 mg Q6H PRN    HYDROmorphone injection Syrg 0.5 mg Q1H PRN    insulin aspart U-100 pen 1-10 Units Q6H PRN    insulin detemir U-100 pen 10 Units Daily    metoclopramide HCl injection 10 mg Q10 Min PRN    metoclopramide HCl injection 5 mg Q6H PRN    morphine injection 2 mg Q5 Min PRN    nozaseptin (NOZIN) nasal  BID    ondansetron disintegrating tablet 8 mg Q8H PRN    oxyCODONE immediate release tablet 5 mg Q3H PRN    pantoprazole EC tablet 40 mg QHS    simvastatin tablet 10 mg QHS    sodium chloride 0.9% flush 3 mL Q8H    sodium chloride 0.9% flush 3 mL PRN    sodium chloride 0.9% flush 5 mL PRN       Objective:     Vital Signs (Most Recent):  Temp: 97.5 °F (36.4 °C) (11/03/18 1213)  Pulse: 75 (11/03/18 1300)  Resp: 18 (11/03/18 1213)  BP: 130/71 (11/03/18 1213)  SpO2: 100 % (11/03/18 1213)  O2 Device (Oxygen Therapy): nasal cannula (11/03/18 0853) Vital Signs (24h Range):  Temp:  [97.2 °F (36.2 °C)-99.2 °F (37.3 °C)] 97.5 °F (36.4 °C)  Pulse:  [74-94] 75  Resp:  [16-20] 18  SpO2:  [94 %-100 %] 100 %  BP: (113-130)/(61-72) 130/71     Weight: 93.8 kg (206 lb 12.7 oz) (11/02/18 0700)  Body mass index is 30.99 kg/m².  Body surface  area is 2.13 meters squared.    I/O last 3 completed shifts:  In: 1591.7 [P.O.:300; I.V.:550; Blood:741.7]  Out: 3300 [Urine:3020; Drains:280]    Physical Exam   Constitutional: He appears well-developed and well-nourished. No distress.   HENT:   Head: Normocephalic and atraumatic.   Neck: No JVD present.   Cardiovascular: Normal rate, regular rhythm and normal heart sounds. Exam reveals no friction rub.   Pulmonary/Chest: Effort normal and breath sounds normal. He has no rales.   Abdominal: Soft. Bowel sounds are normal.   Musculoskeletal: He exhibits edema.   Skin: Skin is warm and dry.   Psychiatric: He has a normal mood and affect. His behavior is normal.   Nursing note and vitals reviewed.      Significant Labs: reviewed  BMP  Lab Results   Component Value Date     11/03/2018     11/03/2018    K 3.6 11/03/2018    K 3.6 11/03/2018     11/03/2018     11/03/2018    CO2 25 11/03/2018    CO2 25 11/03/2018    BUN 48 (H) 11/03/2018    BUN 48 (H) 11/03/2018    CREATININE 2.1 (H) 11/03/2018    CREATININE 2.1 (H) 11/03/2018    CALCIUM 8.4 (L) 11/03/2018    CALCIUM 8.4 (L) 11/03/2018    ANIONGAP 8 11/03/2018    ANIONGAP 8 11/03/2018    ESTGFRAFRICA 37 (A) 11/03/2018    ESTGFRAFRICA 37 (A) 11/03/2018    EGFRNONAA 32 (A) 11/03/2018    EGFRNONAA 32 (A) 11/03/2018     Lab Results   Component Value Date    WBC 9.50 11/03/2018    HGB 9.3 (L) 11/03/2018    HCT 28.0 (L) 11/03/2018    MCV 87 11/03/2018     (L) 11/03/2018       Significant Imaging: reviewed CXR

## 2018-11-03 NOTE — PT/OT/SLP PROGRESS
"Physical Therapy Treatment    Patient Name:  Yan Choudhury   MRN:  [id number]    Recommendations:     Discharge Recommendations:  nursing facility, skilled, home health PT   Discharge Equipment Recommendations:     Barriers to discharge: None    Assessment:     Yan Choudhury is a 63 y.o. male admitted with a medical diagnosis of Ischemic cardiomyopathy.  He presents with the following impairments/functional limitations:  weakness, impaired functional mobilty, decreased ROM, impaired self care skills, impaired endurance     Pt tolerated tx well. He is feeling better today , tolerated walking further today.     Rehab Prognosis:  good; patient would benefit from acute skilled PT services to address these deficits and reach maximum level of function.      Recent Surgery: Procedure(s) (LRB):  REVISION OR CLOSURE, COLOSTOMY (N/A)  HEMICOLECTOMY, RIGHT (Right)  MOBILIZATION, SPLENIC FLEXURE (N/A)  CREATION, ILEOSTOMY (N/A) 4 Days Post-Op    Plan:     During this hospitalization, patient to be seen 5 x/week to address the above listed problems via gait training, therapeutic activities, therapeutic exercises  · Plan of Care Expires:  18   Plan of Care Reviewed with: patient, spouse    Subjective     Communicated with [facility] and nurse Escobedo prior to session.  Patient found in bed upon PT entry to room, agreeable to treatment.      Chief Complaint: none, "I feel better today"  Patient comments/goals: feeling better . No pain. No goals stated.   Pain/Comfort:  · Pain Rating 1: 0/10    Patients cultural, spiritual, Yazdanism conflicts given the current situation:      Objective:     Patient found with: telemetry, oxygen, KINDRA drain, wound vac, peripheral IV     General Precautions: Standard, fall   Orthopedic Precautions:N/A   Braces:       Functional Mobility:  Supine to sit : min a  Sit to supine: CGA  Sit to stand: min a  Stand to Sit : [facility]  Chair to bed trasfer: min a. Stood and took steps with HHA  Gait trainin feet with " a RW and CGA. Head down posture , decreased pace. No lob but mild sob.       AM-PAC 6 CLICK MOBILITY          Therapeutic Activities and Exercises:   sat in the chair after ambulation ~10 min. Worked on ap and TKE then stated that he did not feel well. Nausea, returned to bed.     Patient left HOB elevated with all lines intact, call button in reach, na RN notified and wife present..    GOALS:   Multidisciplinary Problems     Physical Therapy Goals        Problem: Physical Therapy Goal    Goal Priority Disciplines Outcome Goal Variances Interventions   Physical Therapy Goal     PT, PT/OT Ongoing (interventions implemented as appropriate)     Description:  LTG'S TO BE MET IN 7 DAYS (11-8-18)  1. PT WILL REQUIRE SBA FOR BED MOBILITY  2. PT WILL REQUIRE NATI FOR TF'S  3. PT WILL ' WITH RW AND NATI  4. PT WILL DEMO F DYNAMIC BALANCE DURING GAIT                    Time Tracking:     PT Received On: 11/03/18  PT Start Time: 1100     PT Stop Time:  1123  PT Total Time (min):   23 min     Billable Minutes: Gait Training 10 and Therapeutic Exercise 13    Treatment Type: Treatment  PT/PTA: PTA     PTA Visit Number: 1     Ning Arreaga, RAJINDER  11/03/2018

## 2018-11-03 NOTE — PLAN OF CARE
Problem: Patient Care Overview  Goal: Plan of Care Review  Outcome: Ongoing (interventions implemented as appropriate)  POC discussed w/patient, verbalized understanding. NSR on monitor.   VSS. AAO X 4. Voids per wayne hanging to gravity.   IJ intact. Medications administered as prescribed.   D 5 in NS infusing at 50 ml/hr.   Patient is currently NPO except ice chips and sips with medications.  Patient turns independently in bed.    Ileostomy to RLQ clean, dry, and intact.   Midline abdominal incision dressing clean, dry, and intact.   Wound Vac to LLQ to suction clean, dry, and intact.     Fall precautions in place, call bell in reach, bed in low and locked position, spouse at bedside.   Patient remains free from falls/injuries.   Patient denies needs at this time.   Will continue to monitor.

## 2018-11-03 NOTE — PROGRESS NOTES
[facility] -   Nephrology  Progress Note    Patient Name: Yan Choudhury  MRN: [id number]  Admission Date: 10/30/2018  Hospital Length of Stay: 4 days  Attending Provider: Kevin Lindsay MD   Primary Care Physician: Sandra Estevez MD  Principal Problem:Ischemic cardiomyopathy    Subjective:     HPI: Yan Choudhury is a pleasant 63-year-old  gentleman with history of congestive heart failure, CAD, cardiomyopathy, LV ejection fraction about 30%, CKD stage 3/4, baseline creatinine about 2.5 mg/dL, was admitted to the hospital for elective to surgery, he underwent hemicolectomy and revision colostomy, patient's serum creatinine increased from a baseline of 2.5 to 3.4 mg/dL, his urine output also has been about 30 mL/hour, we were consulted for acute on chronic kidney failure, patient was hypotensive following his surgery requiring pressor support.    Interval History: Pt was seen and examined. No new issues overnight, stable last pm. Denies SOB. Noted pt is receiving NS IVF's at 50 ml/hr.    Review of patient's allergies indicates:  No Known Allergies  Current Facility-Administered Medications   Medication Frequency    0.9%  NaCl infusion (for blood administration) Q24H PRN    acetaminophen tablet 1,000 mg Q8H    allopurinol tablet 100 mg Daily    bimatoprost 0.01 % ophthalmic drops 1 drop QHS    carvedilol tablet 12.5 mg BID    chlorhexidine 0.12 % solution 10 mL BID    dextrose 5 % and 0.9 % NaCl infusion Continuous    dextrose 50% injection 12.5 g PRN    diphenhydrAMINE capsule 25 mg PRN    enoxaparin injection 30 mg Daily    glucagon (human recombinant) injection 1 mg PRN    HYDROmorphone injection 1 mg Q6H PRN    HYDROmorphone injection Syrg 0.5 mg Q1H PRN    insulin aspart U-100 pen 1-10 Units Q6H PRN    insulin detemir U-100 pen 10 Units Daily    metoclopramide HCl injection 10 mg Q10 Min PRN    metoclopramide HCl injection 5 mg Q6H PRN    morphine injection 2 mg Q5 Min  PRN    nozaseptin (NOZIN) nasal  BID    ondansetron disintegrating tablet 8 mg Q8H PRN    oxyCODONE immediate release tablet 5 mg Q3H PRN    pantoprazole EC tablet 40 mg QHS    simvastatin tablet 10 mg QHS    sodium chloride 0.9% flush 3 mL Q8H    sodium chloride 0.9% flush 3 mL PRN    sodium chloride 0.9% flush 5 mL PRN       Objective:     Vital Signs (Most Recent):  Temp: 97.5 °F (36.4 °C) (11/03/18 1213)  Pulse: 75 (11/03/18 1300)  Resp: 18 (11/03/18 1213)  BP: 130/71 (11/03/18 1213)  SpO2: 100 % (11/03/18 1213)  O2 Device (Oxygen Therapy): nasal cannula (11/03/18 0853) Vital Signs (24h Range):  Temp:  [97.2 °F (36.2 °C)-99.2 °F (37.3 °C)] 97.5 °F (36.4 °C)  Pulse:  [74-94] 75  Resp:  [16-20] 18  SpO2:  [94 %-100 %] 100 %  BP: (113-130)/(61-72) 130/71     Weight: 93.8 kg (206 lb 12.7 oz) (11/02/18 0700)  Body mass index is 30.99 kg/m².  Body surface area is 2.13 meters squared.    I/O last 3 completed shifts:  In: 1591.7 [P.O.:300; I.V.:550; Blood:741.7]  Out: 3300 [Urine:3020; Drains:280]    Physical Exam   Constitutional: He appears well-developed and well-nourished. No distress.   HENT:   Head: Normocephalic and atraumatic.   Neck: No JVD present.   Cardiovascular: Normal rate, regular rhythm and normal heart sounds. Exam reveals no friction rub.   Pulmonary/Chest: Effort normal and breath sounds normal. He has no rales.   Abdominal: Soft. Bowel sounds are normal.   Musculoskeletal: He exhibits edema.   Skin: Skin is warm and dry.   Psychiatric: He has a normal mood and affect. His behavior is normal.   Nursing note and vitals reviewed.      Significant Labs: reviewed  BMP  Lab Results   Component Value Date     11/03/2018     11/03/2018    K 3.6 11/03/2018    K 3.6 11/03/2018     11/03/2018     11/03/2018    CO2 25 11/03/2018    CO2 25 11/03/2018    BUN 48 (H) 11/03/2018    BUN 48 (H) 11/03/2018    CREATININE 2.1 (H) 11/03/2018    CREATININE 2.1 (H) 11/03/2018     CALCIUM 8.4 (L) 11/03/2018    CALCIUM 8.4 (L) 11/03/2018    ANIONGAP 8 11/03/2018    ANIONGAP 8 11/03/2018    ESTGFRAFRICA 37 (A) 11/03/2018    ESTGFRAFRICA 37 (A) 11/03/2018    EGFRNONAA 32 (A) 11/03/2018    EGFRNONAA 32 (A) 11/03/2018     Lab Results   Component Value Date    WBC 9.50 11/03/2018    HGB 9.3 (L) 11/03/2018    HCT 28.0 (L) 11/03/2018    MCV 87 11/03/2018     (L) 11/03/2018       Significant Imaging: reviewed CXR    Assessment/Plan:         62 y/o male with BROOKE on CKD post abdominal surgery:         BROOKE (acute kidney injury)     BROOKE on CKD stage 4: s Cr continues to improve, still lower today  Renal failure stable, improving  Prior baseline in s Cr was 2.5 mg/d  Cause of BROOKE not clear, likely due to hemodynamic reasons following abdominal surgery  K normal  Acid base stable     Hypertension - BP controlled  Meds reviewed    Anemia - multifactorial, s/p PRBC transfusion   Will monitor      Chronic combined systolic and diastolic congestive heart failure     Cardiac: has moderate to severe CHF.  EF is 30%  Will need to re-start diuretics  Noted on NS IVF's  Noted also had received 60 mg of lasix IV yesterday  Robust response to re-start of lasix IV, large UOP      Plans and recommendations:  As discussed above.  D/c NS IVF's  Re-start bumex low dose 1 mg po bid, tomorrow  Repeat CXR                 Juan Luis Galeas MD  Nephrology  [facility]

## 2018-11-04 LAB
POCT GLUCOSE: 110 MG/DL (ref 70–110)
POCT GLUCOSE: 112 MG/DL (ref 70–110)
POCT GLUCOSE: 119 MG/DL (ref 70–110)
POCT GLUCOSE: 138 MG/DL (ref 70–110)
POCT GLUCOSE: 99 MG/DL (ref 70–110)

## 2018-11-04 PROCEDURE — 97110 THERAPEUTIC EXERCISES: CPT

## 2018-11-04 PROCEDURE — A4216 STERILE WATER/SALINE, 10 ML: HCPCS | Performed by: ANESTHESIOLOGY

## 2018-11-04 PROCEDURE — 63600175 PHARM REV CODE 636 W HCPCS: Performed by: SURGERY

## 2018-11-04 PROCEDURE — 21400001 HC TELEMETRY ROOM

## 2018-11-04 PROCEDURE — 97530 THERAPEUTIC ACTIVITIES: CPT

## 2018-11-04 PROCEDURE — 25000003 PHARM REV CODE 250: Performed by: INTERNAL MEDICINE

## 2018-11-04 PROCEDURE — 25000003 PHARM REV CODE 250: Performed by: ANESTHESIOLOGY

## 2018-11-04 PROCEDURE — 97116 GAIT TRAINING THERAPY: CPT

## 2018-11-04 PROCEDURE — 94761 N-INVAS EAR/PLS OXIMETRY MLT: CPT

## 2018-11-04 PROCEDURE — 99233 SBSQ HOSP IP/OBS HIGH 50: CPT | Mod: ,,, | Performed by: INTERNAL MEDICINE

## 2018-11-04 PROCEDURE — 25000003 PHARM REV CODE 250: Performed by: SURGERY

## 2018-11-04 RX ORDER — BUMETANIDE 1 MG/1
1 TABLET ORAL DAILY
Status: DISCONTINUED | OUTPATIENT
Start: 2018-11-05 | End: 2018-11-08 | Stop reason: HOSPADM

## 2018-11-04 RX ADMIN — ACETAMINOPHEN 1000 MG: 500 TABLET, FILM COATED ORAL at 01:11

## 2018-11-04 RX ADMIN — CARVEDILOL 12.5 MG: 12.5 TABLET, FILM COATED ORAL at 08:11

## 2018-11-04 RX ADMIN — ACETAMINOPHEN 1000 MG: 500 TABLET, FILM COATED ORAL at 11:11

## 2018-11-04 RX ADMIN — ENOXAPARIN SODIUM 30 MG: 100 INJECTION SUBCUTANEOUS at 04:11

## 2018-11-04 RX ADMIN — CHLORHEXIDINE GLUCONATE 10 ML: 1.2 RINSE ORAL at 08:11

## 2018-11-04 RX ADMIN — PANTOPRAZOLE SODIUM 40 MG: 40 TABLET, DELAYED RELEASE ORAL at 08:11

## 2018-11-04 RX ADMIN — Medication 3 ML: at 09:11

## 2018-11-04 RX ADMIN — BUMETANIDE 1 MG: 1 TABLET ORAL at 08:11

## 2018-11-04 RX ADMIN — Medication 3 ML: at 01:11

## 2018-11-04 RX ADMIN — SIMVASTATIN 10 MG: 5 TABLET, FILM COATED ORAL at 08:11

## 2018-11-04 RX ADMIN — BIMATOPROST 1 DROP: 0.1 SOLUTION/ DROPS OPHTHALMIC at 08:11

## 2018-11-04 RX ADMIN — ACETAMINOPHEN 1000 MG: 500 TABLET, FILM COATED ORAL at 06:11

## 2018-11-04 RX ADMIN — ALLOPURINOL 100 MG: 100 TABLET ORAL at 08:11

## 2018-11-04 RX ADMIN — Medication 3 ML: at 06:11

## 2018-11-04 RX ADMIN — INSULIN DETEMIR 10 UNITS: 100 INJECTION, SOLUTION SUBCUTANEOUS at 08:11

## 2018-11-04 NOTE — PT/OT/SLP PROGRESS
Physical Therapy Treatment    Patient Name:  Yan Choudhury   MRN:  [id number]    Recommendations:     Discharge Recommendations:  home health PT, nursing facility, skilled   Discharge Equipment Recommendations:     Barriers to discharge: None    Assessment:     Yan Choudhury is a 63 y.o. male admitted with a medical diagnosis of Ischemic cardiomyopathy.  He presents with the following impairments/functional limitations:  weakness, impaired endurance, impaired self care skills, impaired functional mobilty, gait instability, impaired balance, decreased safety awareness .    Pt participates with therapy but does not demonstrate much motivation past that. He refuses to spend more that 15 min oob in the chair. Progressing with ambulation.     Rehab Prognosis:  fair; patient would benefit from acute skilled PT services to address these deficits and reach maximum level of function.      Recent Surgery: Procedure(s) (LRB):  REVISION OR CLOSURE, COLOSTOMY (N/A)  HEMICOLECTOMY, RIGHT (Right)  MOBILIZATION, SPLENIC FLEXURE (N/A)  CREATION, ILEOSTOMY (N/A) 5 Days Post-Op    Plan:     During this hospitalization, patient to be seen 5 x/week to address the above listed problems via gait training, therapeutic activities, therapeutic exercises  · Plan of Care Expires:  11/09/18   Plan of Care Reviewed with: patient    Subjective     Communicated with epic and Nurse: na  prior to session.  Patient found in bed upon PT entry to room, agreeable to treatment.      Chief Complaint: none stated  Patient comments/goals: none stated.   Pain/Comfort:  · Pain Rating 1: 0/10    Patients cultural, spiritual, Restorationist conflicts given the current situation:      Objective:     Patient found with: wound vac, bed alarm, colostomy, wayne catheter, KINDRA drain     General Precautions: Standard, fall, NPO(allowed ice chips)   Orthopedic Precautions:N/A   Braces:       Functional Mobility:  · Supine to sit : MIN   · Sit to supine: SBA to manage  lines  · Sit to stand: SBA from the EOB  · Gait trainin feet with a RW SBA       AM-PAC 6 CLICK MOBILITY  Turning over in bed (including adjusting bedclothes, sheets and blankets)?: 4  Sitting down on and standing up from a chair with arms (e.g., wheelchair, bedside commode, etc.): 4  Moving from lying on back to sitting on the side of the bed?: 3  Moving to and from a bed to a chair (including a wheelchair)?: 3  Need to walk in hospital room?: 3  Climbing 3-5 steps with a railing?: 1  Basic Mobility Total Score: 18       Therapeutic Activities and Exercises:   Seated UE activity for endurance x 5 min. Seated AP, tke and HF for strengthening.15 reps each, AROM     Patient left up in chair with all lines intact, call button in reach and nurse notified..    GOALS:   Multidisciplinary Problems     Physical Therapy Goals        Problem: Physical Therapy Goal    Goal Priority Disciplines Outcome Goal Variances Interventions   Physical Therapy Goal     PT, PT/OT Ongoing (interventions implemented as appropriate)     Description:  LTG'S TO BE MET IN 7 DAYS (18)  1. PT WILL REQUIRE SBA FOR BED MOBILITY  2. PT WILL REQUIRE NATI FOR TF'S  3. PT WILL ' WITH RW AND NATI  4. PT WILL DEMO F DYNAMIC BALANCE DURING GAIT                    Time Tracking:     PT Received On: 18  PT Start Time: 1030     PT Stop Time: 1100  PT Total Time (min): 30 min     Billable Minutes: Gait Training 15 and Therapeutic Exercise 15    Treatment Type: Treatment  PT/PTA: PTA     PTA Visit Number: 2     Ning Arreaga, RAJINDER  2018

## 2018-11-04 NOTE — PROGRESS NOTES
[facility] -   Nephrology  Progress Note    Patient Name: Yan Choudhury  MRN: [id number]  Admission Date: 10/30/2018  Hospital Length of Stay: 5 days  Attending Provider: Kevin Lindsay MD   Primary Care Physician: Sandra Estevez MD  Principal Problem:Ischemic cardiomyopathy    Subjective:     HPI: Yan Choudhury is a pleasant 63-year-old  gentleman with history of congestive heart failure, CAD, cardiomyopathy, LV ejection fraction about 30%, CKD stage 3/4, baseline creatinine about 2.5 mg/dL, was admitted to the hospital for elective to surgery, he underwent hemicolectomy and revision colostomy, patient's serum creatinine increased from a baseline of 2.5 to 3.4 mg/dL, his urine output also has been about 30 mL/hour, we were consulted for acute on chronic kidney failure, patient was hypotensive following his surgery requiring pressor support.    Interval History: Pt was seen and examined. Stable last pm, no new events. PO diuretics were started this am as pt has a h/o of CHF with low EF of 30%. Has no SOB. Wants to eat, deferred that request to PCP and surgery. Has no abd pain.    Review of patient's allergies indicates:  No Known Allergies  Current Facility-Administered Medications   Medication Frequency    0.9%  NaCl infusion (for blood administration) Q24H PRN    acetaminophen tablet 1,000 mg Q8H    allopurinol tablet 100 mg Daily    bimatoprost 0.01 % ophthalmic drops 1 drop QHS    bumetanide tablet 1 mg BID    carvedilol tablet 12.5 mg BID    dextrose 50% injection 12.5 g PRN    diphenhydrAMINE capsule 25 mg PRN    enoxaparin injection 30 mg Daily    glucagon (human recombinant) injection 1 mg PRN    HYDROmorphone injection 1 mg Q6H PRN    HYDROmorphone injection Syrg 0.5 mg Q1H PRN    insulin aspart U-100 pen 1-10 Units Q6H PRN    insulin detemir U-100 pen 10 Units Daily    metoclopramide HCl injection 10 mg Q10 Min PRN    metoclopramide HCl injection 5 mg Q6H PRN     morphine injection 2 mg Q5 Min PRN    nozaseptin (NOZIN) nasal  BID    ondansetron disintegrating tablet 8 mg Q8H PRN    oxyCODONE immediate release tablet 5 mg Q3H PRN    pantoprazole EC tablet 40 mg QHS    simvastatin tablet 10 mg QHS    sodium chloride 0.9% flush 3 mL Q8H    sodium chloride 0.9% flush 3 mL PRN    sodium chloride 0.9% flush 5 mL PRN       Objective:     Vital Signs (Most Recent):  Temp: 98.4 °F (36.9 °C) (11/04/18 1133)  Pulse: 79 (11/04/18 1133)  Resp: 18 (11/04/18 1133)  BP: 121/65 (11/04/18 1133)  SpO2: 98 % (11/04/18 1133)  O2 Device (Oxygen Therapy): room air (11/04/18 0834) Vital Signs (24h Range):  Temp:  [97.5 °F (36.4 °C)-98.6 °F (37 °C)] 98.4 °F (36.9 °C)  Pulse:  [74-82] 79  Resp:  [18] 18  SpO2:  [97 %-100 %] 98 %  BP: (116-130)/(65-72) 121/65     Weight: 93.8 kg (206 lb 12.7 oz) (11/02/18 0700)  Body mass index is 30.99 kg/m².  Body surface area is 2.13 meters squared.    I/O last 3 completed shifts:  In: 1370 [P.O.:270; I.V.:1100]  Out: 2505 [Urine:1120; Drains:385; Stool:1000]    Physical Exam   Constitutional: He appears well-developed and well-nourished. No distress.   HENT:   Head: Normocephalic and atraumatic.   Neck: No JVD present.   Cardiovascular: Normal rate, regular rhythm and normal heart sounds. Exam reveals no friction rub.   Pulmonary/Chest: Effort normal and breath sounds normal. He has no rales.   Abdominal: Soft. Bowel sounds are normal.   Musculoskeletal: He exhibits edema.   Skin: Skin is warm and dry.   Psychiatric: He has a normal mood and affect. His behavior is normal.   Nursing note and vitals reviewed.      Significant Labs: reviewed  BMP  Lab Results   Component Value Date     11/03/2018     11/03/2018    K 3.6 11/03/2018    K 3.6 11/03/2018     11/03/2018     11/03/2018    CO2 25 11/03/2018    CO2 25 11/03/2018    BUN 48 (H) 11/03/2018    BUN 48 (H) 11/03/2018    CREATININE 2.1 (H) 11/03/2018    CREATININE 2.1 (H)  11/03/2018    CALCIUM 8.4 (L) 11/03/2018    CALCIUM 8.4 (L) 11/03/2018    ANIONGAP 8 11/03/2018    ANIONGAP 8 11/03/2018    ESTGFRAFRICA 37 (A) 11/03/2018    ESTGFRAFRICA 37 (A) 11/03/2018    EGFRNONAA 32 (A) 11/03/2018    EGFRNONAA 32 (A) 11/03/2018     Lab Results   Component Value Date    WBC 9.50 11/03/2018    HGB 9.3 (L) 11/03/2018    HCT 28.0 (L) 11/03/2018    MCV 87 11/03/2018     (L) 11/03/2018         Significant Imaging: reviewed CXr, has mild pulm edema  The heart is mildly enlarged.  Pacemaker on the left.  Right-sided internal jugular catheter again noted.  Mild vascular congestion with atelectatic change or infiltrate left lung base    Assessment/Plan:         62 y/o male with BROOKE on CKD post abdominal surgery:           BROOKE (acute kidney injury)     BROOKE on CKD stage 4: s Cr has improved and now is at or slightly lower than prior baseline.  Stable renal function and improved  Cause of BROOKE not clear, likely due to hemodynamic reasons following abdominal surgery  K normal  Acid base stable     Hypertension - BP controlled  Meds reviewed     Anemia - multifactorial, s/p PRBC transfusion   Will monitor      Chronic combined systolic and diastolic congestive heart failure     Cardiac: has moderate to severe CHF.  EF is 30%  Has pulm edema on CXR  Re-started diuretics, bumex, at low dose 1 mg po bid  Will monitor fluid status        Plans and recommendations:  As discussed above.  Continue bumex at maintenance low dose 1 mg po qd to bid                 Juan Luis Galeas MD  Nephrology  [facility]

## 2018-11-04 NOTE — PT/OT/SLP PROGRESS
Occupational Therapy  Treatment    Yan Choudhury   MRN: [id number]   Admitting Diagnosis: Ischemic cardiomyopathy    OT Date of Treatment: 11/04/18   OT Start Time: 1055  OT Stop Time: 1120  OT Total Time (min): 25 min    Billable Minutes:  Therapeutic Activity 15 minutes and Therapeutic Exercise 10 minutes    General Precautions: Standard, fall  Orthopedic Precautions: N/A  Braces: N/A         Subjective:  Communicated with nurse Escobedo and epic chart review prior to session.    Pain/Comfort  Pain Rating 1: 0/10    Objective:  Patient found with: telemetry, wound vac, peripheral IV     Functional Mobility:  Bed Mobility:   sba except supie>sit min a    Transfers:       sba   Functional Ambulation: pt ambulated 200 feet with cga , rw, wound vac and IV in tow x 1 standing rest break    Activities of Daily Living:     Feeding adaptive equipment: na     UE adaptive equipment: min a      LE adaptive equipment: na                    Bathing adaptive equipment: na      Balance:   Static Sit: FAIR+: Able to take MINIMAL challenges from all directions  Dynamic Sit: FAIR: Cannot move trunk without losing balance  Static Stand: POOR+: Needs MINIMAL assist to maintain  Dynamic stand: POOR+: Needs MIN (minimal ) assist during gait    Therapeutic Activities and Exercises:  Pt seen in room. Pt req min a  Supine > sit. Pt req cga/ min a with sit<>stand t/f's as well as functional mobility 200 feet with rw, iv pole, and  wound vac in tow. Pt tolerate 1 set x1 0- 20 reps b ue rom exercise in all available planes and ranges seated in bed side chair. pt educated to remain in bed side chair. Pt verbalized  Understanding of remaining oob. Pt left with call button in reach and all needs net.      AM-PAC 6 CLICK ADL   How much help from another person does this patient currently need?   1 = Unable, Total/Dependent Assistance  2 = A lot, Maximum/Moderate Assistance  3 = A little, Minimum/Contact Guard/Supervision  4 = None, Modified  "Wheeler/Independent    Putting on and taking off regular lower body clothing? : 1  Bathing (including washing, rinsing, drying)?: 2  Toileting, which includes using toilet, bedpan, or urinal? : 2  Putting on and taking off regular upper body clothing?: 3  Taking care of personal grooming such as brushing teeth?: 3  Eating meals?: 3  Daily Activity Total Score: 14     AM-PAC Raw Score CMS "G-Code Modifier Level of Impairment Assistance   6 % Total / Unable   7 - 8 CM 80 - 100% Maximal Assist   9-13 CL 60 - 80% Moderate Assist   14 - 19 CK 40 - 60% Moderate Assist   20 - 22 CJ 20 - 40% Minimal Assist   23 CI 1-20% SBA / CGA   24 CH 0% Independent/ Mod I       Patient left up in chair with all lines intact, call button in reach, nurse na notified and teodoro rowe present    ASSESSMENT:  Yan Choudhury is a 63 y.o. male with a medical diagnosis of Ischemic cardiomyopathy and presents with debility and generalized weakness. Pt will continue to benefit from skilled OT.    Rehab identified problem list/impairments: Rehab identified problem list/impairments: weakness, impaired self care skills, impaired balance, decreased safety awareness, impaired endurance, impaired functional mobilty, gait instability    Rehab potential is good.    Activity tolerance: Good    Discharge recommendations: Discharge Facility/Level Of Care Needs: home health OT     Barriers to discharge: Barriers to Discharge: None    Equipment recommendations: bath bench, walker, rolling     GOALS:   Multidisciplinary Problems     Occupational Therapy Goals        Problem: Occupational Therapy Goal    Goal Priority Disciplines Outcome Interventions   Occupational Therapy Goal     OT, PT/OT Ongoing (interventions implemented as appropriate)    Description:  ot goals to be met by 11-11-18  Pt will tolerata 1 set x 20 reps b ue rom exercise  With min resistance  s with ue dressing  s with bsc t/f's                    Plan:  Patient to be seen 3 " x/week to address the above listed problems via self-care/home management, therapeutic activities, therapeutic exercises  Plan of Care expires: 11/11/18  Plan of Care reviewed with: patient         Ronna Whitaker, OT  11/04/2018

## 2018-11-04 NOTE — PROGRESS NOTES
[facility] -   General Surgery  Progress Note    Subjective:     History of Present Illness:     Patient underwent a sigmoid resection for perforated diverticulitis.  He has had a colostomy since his surgery. Patient has developed a parastomal hernia.     The patient recently had some issues with his colostomy.  He was seen in the office.     The patient was evaluated by Cardiology was found to have a moderate to high risk of complications from a cardiac point of view with colostomy reversal     An echocardiogram showed an ejection fraction of 30-35%.  But there was decreased ventricular function.    Patient was also found to have a mass in the proximal transverse colon     He now presents for colostomy reversal, and resection of the colon mass           Post-Op Info:  Procedure(s) (LRB):  REVISION OR CLOSURE, COLOSTOMY (N/A)  HEMICOLECTOMY, RIGHT (Right)  MOBILIZATION, SPLENIC FLEXURE (N/A)  CREATION, ILEOSTOMY (N/A)   5 Days Post-Op     Interval History: Distended, increased ostomy output, ambulating with physical therapy    Medications:  Continuous Infusions:    Scheduled Meds:   acetaminophen  1,000 mg Oral Q8H    allopurinol  100 mg Oral Daily    bimatoprost  1 drop Both Eyes QHS    bumetanide  1 mg Oral BID    carvedilol  12.5 mg Oral BID    enoxaparin  30 mg Subcutaneous Daily    insulin detemir U-100  10 Units Subcutaneous Daily    nozaseptin   Each Nare BID    pantoprazole  40 mg Oral QHS    simvastatin  10 mg Oral QHS    sodium chloride 0.9%  3 mL Intravenous Q8H     PRN Meds:sodium chloride, dextrose 50%, diphenhydrAMINE, glucagon (human recombinant), HYDROmorphone, HYDROmorphone, insulin aspart U-100, metoclopramide HCl, metoclopramide HCl, morphine, ondansetron, oxyCODONE, sodium chloride 0.9%, sodium chloride 0.9%     Review of patient's allergies indicates:  No Known Allergies  Objective:     Vital Signs (Most Recent):  Temp: 98.3 °F (36.8 °C) (11/04/18 0658)  Pulse: 78  (11/04/18 0900)  Resp: 18 (11/04/18 0658)  BP: 130/70 (11/04/18 0658)  SpO2: 100 % (11/04/18 0834) Vital Signs (24h Range):  Temp:  [97.5 °F (36.4 °C)-98.6 °F (37 °C)] 98.3 °F (36.8 °C)  Pulse:  [74-87] 78  Resp:  [18] 18  SpO2:  [97 %-100 %] 100 %  BP: (116-130)/(65-72) 130/70     Weight: 93.8 kg (206 lb 12.7 oz)  Body mass index is 30.99 kg/m².    Intake/Output - Last 3 Shifts       11/02 0700 - 11/03 0659 11/03 0700 - 11/04 0659 11/04 0700 - 11/05 0659    P.O. 210 60     I.V. (mL/kg) 550 (5.9) 550 (5.9)     Blood 458.3      Total Intake(mL/kg) 1218.3 (13) 610 (6.5)     Urine (mL/kg/hr) 2120 (0.9) 400 (0.2)     Drains 235 230     Other       Stool  1000     Total Output 2355 1630     Net -1136.7 -1020            Urine Occurrence  600 x           Physical Exam   Constitutional: He is oriented to person, place, and time. He appears well-developed and well-nourished.   HENT:   Head: Normocephalic and atraumatic.   Eyes: EOM are normal.   Cardiovascular: Normal rate and regular rhythm.   Pulmonary/Chest: Effort normal. No respiratory distress.   Abdominal: Soft. He exhibits distension. There is tenderness.   Incisions c/d/i. Wound vac in place to stoma site. Ileostomy is pink, viable.  Large amount of liquid in bag   Musculoskeletal: Normal range of motion.   Neurological: He is alert and oriented to person, place, and time.   Skin: Skin is warm and dry.   Psychiatric: He has a normal mood and affect. Thought content normal.   Vitals reviewed.      Significant Labs:  CBC:   Recent Labs   Lab 11/03/18  1317   WBC 9.50   RBC 3.22*   HGB 9.3*   HCT 28.0*   *   MCV 87   MCH 28.9   MCHC 33.2     CMP:   Recent Labs   Lab 11/01/18  0350  11/03/18  1317   *  184*   < > 165*  165*   CALCIUM 7.9*  7.9*   < > 8.4*  8.4*   ALBUMIN 2.6*  --   --    PROT 5.4*  --   --      136   < > 137  137   K 3.9  3.9   < > 3.6  3.6   CO2 22*  22*   < > 25  25     106   < > 104  104   BUN 52*  52*   < > 48*   48*   CREATININE 3.2*  3.2*   < > 2.1*  2.1*   ALKPHOS 37*  --   --    ALT 9*  --   --    AST 24  --   --    BILITOT 0.6  --   --     < > = values in this interval not displayed.       Significant Diagnostics:  I have reviewed all pertinent imaging results/findings within the past 24 hours.    Assessment/Plan:     * Ischemic cardiomyopathy    monitor         BROOKE (acute kidney injury)    Acute on chronic  Nephology has been consulted     History of colostomy reversal    Colostomy has been reversed, air leak on proctoscopy, protective ileostomy in place     Parastomal hernia without obstruction or gangrene    Parastomal hernia was closed at the time of colostomy reversal.  Local wound care.     Chronic combined systolic and diastolic congestive heart failure    Monitor       Colostomy in place    Colostomy has now been reversed  Wound care to colostomy site, wound care consult  Ileostomy output increasing, still somewhat distended  Clear liquid diet will advance as tolerated  Ambulate     Anemia    Combination of acute blood loss and chronic disease and dilutional  Continue to monitor     S/P partial colectomy    Postop day 3 right hemicolectomy, colostomy reversal protective ileostomy.  Monitor ileostomy.     Biventricular ICD (implantable cardioverter-defibrillator) in place    Monitor with telemetry     CAD, multiple vessel    Continue to monitor     CKD (chronic kidney disease) stage 4, GFR 15-29 ml/min    Nephrology consultation continue to monitor     LBBB (left bundle branch block)    Monitor with telemetry     Hyperlipidemia associated with type 2 diabetes mellitus    Continue home medications for hyperlipidemia.  Insulin sliding scale for diabetes     Pulmonary HTN    Monitor     Hypertension associated with diabetes    Continue antihypertensives, nephrology has been consulted and they will address these as they deem appropriate         Ubaldo Melton MD  General Surgery  [facility] -

## 2018-11-04 NOTE — PLAN OF CARE
Problem: Patient Care Overview  Goal: Plan of Care Review  Outcome: Ongoing (interventions implemented as appropriate)  POC reviewed, verbalized understanding. Pt remained free from falls, fall precautions in place. Pt is NSR on monitor, 60-90s. VSS. No other c/o at this time. R IJ TLC intact, fluids infusing without difficulty. Blood glucose monitored, treated as needed. Monitored fluid intake and ileostomy output.  Call bell and personal belongings within reach. Hourly rounding complete. Reminded to call for assistance. Will continue to monitor

## 2018-11-04 NOTE — ASSESSMENT & PLAN NOTE
62 y/o male with BROOKE on CKD post abdominal surgery:           BROOKE (acute kidney injury)     BROOKE on CKD stage 4: s Cr continues to improve, still lower today  Renal failure stable, improving  Prior baseline in s Cr was 2.5 mg/d  Cause of BROOKE not clear, likely due to hemodynamic reasons following abdominal surgery  K normal  Acid base stable     Hypertension - BP controlled  Meds reviewed     Anemia - multifactorial, s/p PRBC transfusion   Will monitor      Chronic combined systolic and diastolic congestive heart failure     Cardiac: has moderate to severe CHF.  EF is 30%  Will need to re-start diuretics  Noted on NS IVF's  Noted also had received 60 mg of lasix IV yesterday  Robust response to re-start of lasix IV, large UOP      Plans and recommendations:  As discussed above.  D/c NS IVF's  Re-start bumex low dose 1 mg po bid, tomorrow  Repeat CXR

## 2018-11-04 NOTE — PLAN OF CARE
Problem: Patient Care Overview  Goal: Plan of Care Review  Outcome: Ongoing (interventions implemented as appropriate)  POC reviewed, verbalized understanding. Pt remained free from falls, fall precautions in place. Pt is NSR on monitor. VSS. No other c/o at this time. PIV intact. Call bell and personal belongings within reach. Hourly rounding complete. Reminded to call for assistance. Will continue to monitor.

## 2018-11-04 NOTE — PLAN OF CARE
Problem: Patient Care Overview  Goal: Plan of Care Review  Outcome: Ongoing (interventions implemented as appropriate)  Pt ambulated 150 feet with a Rw and CGA. Sat oob in the chair and was later found in the bed. Pt only sat ~15 min then put him self to bed. HE WOULD BENEFIT FROM A CHAIR ALARM

## 2018-11-04 NOTE — SUBJECTIVE & OBJECTIVE
Interval History: Distended, increased ostomy output, ambulating with physical therapy    Medications:  Continuous Infusions:    Scheduled Meds:   acetaminophen  1,000 mg Oral Q8H    allopurinol  100 mg Oral Daily    bimatoprost  1 drop Both Eyes QHS    bumetanide  1 mg Oral BID    carvedilol  12.5 mg Oral BID    enoxaparin  30 mg Subcutaneous Daily    insulin detemir U-100  10 Units Subcutaneous Daily    nozaseptin   Each Nare BID    pantoprazole  40 mg Oral QHS    simvastatin  10 mg Oral QHS    sodium chloride 0.9%  3 mL Intravenous Q8H     PRN Meds:sodium chloride, dextrose 50%, diphenhydrAMINE, glucagon (human recombinant), HYDROmorphone, HYDROmorphone, insulin aspart U-100, metoclopramide HCl, metoclopramide HCl, morphine, ondansetron, oxyCODONE, sodium chloride 0.9%, sodium chloride 0.9%     Review of patient's allergies indicates:  No Known Allergies  Objective:     Vital Signs (Most Recent):  Temp: 98.3 °F (36.8 °C) (11/04/18 0658)  Pulse: 78 (11/04/18 0900)  Resp: 18 (11/04/18 0658)  BP: 130/70 (11/04/18 0658)  SpO2: 100 % (11/04/18 0834) Vital Signs (24h Range):  Temp:  [97.5 °F (36.4 °C)-98.6 °F (37 °C)] 98.3 °F (36.8 °C)  Pulse:  [74-87] 78  Resp:  [18] 18  SpO2:  [97 %-100 %] 100 %  BP: (116-130)/(65-72) 130/70     Weight: 93.8 kg (206 lb 12.7 oz)  Body mass index is 30.99 kg/m².    Intake/Output - Last 3 Shifts       11/02 0700 - 11/03 0659 11/03 0700 - 11/04 0659 11/04 0700 - 11/05 0659    P.O. 210 60     I.V. (mL/kg) 550 (5.9) 550 (5.9)     Blood 458.3      Total Intake(mL/kg) 1218.3 (13) 610 (6.5)     Urine (mL/kg/hr) 2120 (0.9) 400 (0.2)     Drains 235 230     Other       Stool  1000     Total Output 2355 1630     Net -1136.7 -1020            Urine Occurrence  600 x           Physical Exam   Constitutional: He is oriented to person, place, and time. He appears well-developed and well-nourished.   HENT:   Head: Normocephalic and atraumatic.   Eyes: EOM are normal.   Cardiovascular: Normal  rate and regular rhythm.   Pulmonary/Chest: Effort normal. No respiratory distress.   Abdominal: Soft. He exhibits distension. There is tenderness.   Incisions c/d/i. Wound vac in place to stoma site. Ileostomy is pink, viable.  Large amount of liquid in bag   Musculoskeletal: Normal range of motion.   Neurological: He is alert and oriented to person, place, and time.   Skin: Skin is warm and dry.   Psychiatric: He has a normal mood and affect. Thought content normal.   Vitals reviewed.      Significant Labs:  CBC:   Recent Labs   Lab 11/03/18  1317   WBC 9.50   RBC 3.22*   HGB 9.3*   HCT 28.0*   *   MCV 87   MCH 28.9   MCHC 33.2     CMP:   Recent Labs   Lab 11/01/18  0350  11/03/18  1317   *  184*   < > 165*  165*   CALCIUM 7.9*  7.9*   < > 8.4*  8.4*   ALBUMIN 2.6*  --   --    PROT 5.4*  --   --      136   < > 137  137   K 3.9  3.9   < > 3.6  3.6   CO2 22*  22*   < > 25  25     106   < > 104  104   BUN 52*  52*   < > 48*  48*   CREATININE 3.2*  3.2*   < > 2.1*  2.1*   ALKPHOS 37*  --   --    ALT 9*  --   --    AST 24  --   --    BILITOT 0.6  --   --     < > = values in this interval not displayed.       Significant Diagnostics:  I have reviewed all pertinent imaging results/findings within the past 24 hours.

## 2018-11-04 NOTE — ASSESSMENT & PLAN NOTE
Colostomy has now been reversed  Wound care to colostomy site, wound care consult  Ileostomy output increasing, still somewhat distended  Clear liquid diet will advance as tolerated  Ambulate

## 2018-11-05 PROBLEM — Z93.2 ILEOSTOMY IN PLACE: Status: ACTIVE | Noted: 2018-11-05

## 2018-11-05 LAB
POCT GLUCOSE: 141 MG/DL (ref 70–110)
POCT GLUCOSE: 173 MG/DL (ref 70–110)

## 2018-11-05 PROCEDURE — 99900035 HC TECH TIME PER 15 MIN (STAT)

## 2018-11-05 PROCEDURE — 63600175 PHARM REV CODE 636 W HCPCS: Performed by: SURGERY

## 2018-11-05 PROCEDURE — 25000003 PHARM REV CODE 250: Performed by: INTERNAL MEDICINE

## 2018-11-05 PROCEDURE — 96372 THER/PROPH/DIAG INJ SC/IM: CPT

## 2018-11-05 PROCEDURE — 99233 SBSQ HOSP IP/OBS HIGH 50: CPT | Mod: ,,, | Performed by: INTERNAL MEDICINE

## 2018-11-05 PROCEDURE — 25000003 PHARM REV CODE 250: Performed by: SURGERY

## 2018-11-05 PROCEDURE — 94761 N-INVAS EAR/PLS OXIMETRY MLT: CPT

## 2018-11-05 PROCEDURE — 21400001 HC TELEMETRY ROOM

## 2018-11-05 RX ADMIN — ALLOPURINOL 100 MG: 100 TABLET ORAL at 08:11

## 2018-11-05 RX ADMIN — CARVEDILOL 12.5 MG: 12.5 TABLET, FILM COATED ORAL at 10:11

## 2018-11-05 RX ADMIN — BUMETANIDE 1 MG: 1 TABLET ORAL at 08:11

## 2018-11-05 RX ADMIN — BIMATOPROST 1 DROP: 0.1 SOLUTION/ DROPS OPHTHALMIC at 10:11

## 2018-11-05 RX ADMIN — CARVEDILOL 12.5 MG: 12.5 TABLET, FILM COATED ORAL at 08:11

## 2018-11-05 RX ADMIN — ACETAMINOPHEN 1000 MG: 500 TABLET, FILM COATED ORAL at 10:11

## 2018-11-05 RX ADMIN — PANTOPRAZOLE SODIUM 40 MG: 40 TABLET, DELAYED RELEASE ORAL at 10:11

## 2018-11-05 RX ADMIN — ENOXAPARIN SODIUM 30 MG: 100 INJECTION SUBCUTANEOUS at 06:11

## 2018-11-05 RX ADMIN — SIMVASTATIN 10 MG: 5 TABLET, FILM COATED ORAL at 10:11

## 2018-11-05 NOTE — PT/OT/SLP PROGRESS
Physical Therapy      Patient Name:  Yan Choudhury   MRN:  [id number]    RE-ATTEMPTED P.T. TX THIS PM, PT STILL REFUSING TX., C/O FEELING POORLY, WILL ASSESS PT NEXT VISIT    Ilda Gould, PT   11/5/2018  4381

## 2018-11-05 NOTE — SUBJECTIVE & OBJECTIVE
Interval History: tolerated clear, no nausea, would like to go home with home services    Medications:  Continuous Infusions:  Scheduled Meds:   acetaminophen  1,000 mg Oral Q8H    allopurinol  100 mg Oral Daily    bimatoprost  1 drop Both Eyes QHS    bumetanide  1 mg Oral Daily    carvedilol  12.5 mg Oral BID    enoxaparin  30 mg Subcutaneous Daily    insulin detemir U-100  12 Units Subcutaneous Daily    nozaseptin   Each Nare BID    pantoprazole  40 mg Oral QHS    simvastatin  10 mg Oral QHS     PRN Meds:dextrose 50%, diphenhydrAMINE, glucagon (human recombinant), HYDROmorphone, HYDROmorphone, insulin aspart U-100, metoclopramide HCl, ondansetron, sodium chloride 0.9%, sodium chloride 0.9%     Review of patient's allergies indicates:  No Known Allergies  Objective:     Vital Signs (Most Recent):  Temp: 98.9 °F (37.2 °C) (11/05/18 0326)  Pulse: 86 (11/05/18 0554)  Resp: 18 (11/05/18 0326)  BP: 112/61 (11/05/18 0326)  SpO2: 98 % (11/05/18 0326) Vital Signs (24h Range):  Temp:  [98.3 °F (36.8 °C)-99.3 °F (37.4 °C)] 98.9 °F (37.2 °C)  Pulse:  [67-86] 86  Resp:  [18] 18  SpO2:  [97 %-100 %] 98 %  BP: (100-121)/(56-65) 112/61     Weight: 93.8 kg (206 lb 12.7 oz)  Body mass index is 30.99 kg/m².    Intake/Output - Last 3 Shifts       11/03 0700 - 11/04 0659 11/04 0700 - 11/05 0659 11/05 0700 - 11/06 0659    P.O. 60 120     I.V. (mL/kg) 550 (5.9)      Blood       Total Intake(mL/kg) 610 (6.5) 120 (1.3)     Urine (mL/kg/hr) 400 (0.2) 1700 (0.8)     Drains 230      Other  50     Stool 1000 1460     Total Output 1630 3210     Net -1020 -3090            Urine Occurrence 600 x            Physical Exam   Constitutional: No distress.   obese   HENT:   Head: Normocephalic and atraumatic.   Neck: Normal range of motion. Neck supple.   Cardiovascular: Normal rate, regular rhythm and normal heart sounds.   Pulmonary/Chest: Effort normal and breath sounds normal.   Abdominal: Soft. Bowel sounds are normal. Distention: mild.    Ileostomy with output ruq  Wound vac llq  incision is clean  Drain is serous   Musculoskeletal: Normal range of motion. He exhibits no edema.   Neurological: He is alert.   Skin: Skin is warm and dry. Capillary refill takes less than 2 seconds.   Psychiatric: He has a normal mood and affect. His behavior is normal. Judgment and thought content normal.   Vitals reviewed.      Significant Labs:  CBC:   Recent Labs   Lab 11/03/18  1317   WBC 9.50   RBC 3.22*   HGB 9.3*   HCT 28.0*   *   MCV 87   MCH 28.9   MCHC 33.2     BMP:   Recent Labs   Lab 11/02/18  0930 11/03/18  1317   * 165*  165*    137  137   K 3.5 3.6  3.6    104  104   CO2 24 25  25   BUN 44* 48*  48*   CREATININE 2.3* 2.1*  2.1*   CALCIUM 9.2 8.4*  8.4*   MG 1.5*  --        Significant Diagnostics:  none

## 2018-11-05 NOTE — ASSESSMENT & PLAN NOTE
64 y/o male with BROOKE on CKD post abdominal surgery:           BROOKE (acute kidney injury)     BROOKE on CKD stage 4: s Cr has improved and now is at or slightly lower than prior baseline.  Stable renal function and improved  Cause of BROOKE not clear, likely due to hemodynamic reasons following abdominal surgery  K normal  Acid base stable     Hypertension - BP controlled  Meds reviewed     Anemia - multifactorial, s/p PRBC transfusion   Will monitor      Chronic combined systolic and diastolic congestive heart failure     Cardiac: has moderate to severe CHF.  EF is 30%  Has pulm edema on CXR  Re-started diuretics, bumex, at low dose 1 mg po bid  Will monitor fluid status         Plans and recommendations:  As discussed above.  Continue bumex at maintenance low dose 1 mg po qd to bid

## 2018-11-05 NOTE — PLAN OF CARE
11/05/18 1130   Medicare Message   Important Message from Medicare regarding Discharge Appeal Rights Given to patient/caregiver;Signed/date by patient/caregiver;Explained to patient/caregiver   Date IMM was signed 11/05/18   Time IMM was signed [id number]

## 2018-11-05 NOTE — PLAN OF CARE
Per physician progress note patient should be able to discharge home tomorrow with the vac and home health. Met with the patient and his wife discussed options for receiving home health. Preference letter obtained for [facility]. Referral faxed to [facility] via [facility]. IMM signed. Application for a home wound vac faxed to [facility].

## 2018-11-05 NOTE — PROGRESS NOTES
"[facility] -   Nephrology  Progress Note    Patient Name: Yan Choudhury  MRN: [id number]  Admission Date: 10/30/2018  Hospital Length of Stay: 6 days  Attending Provider: Kevin Lindsay MD   Primary Care Physician: Sandra Estevez MD  Principal Problem:Ischemic cardiomyopathy    Subjective:     HPI: Yan Choudhury is a pleasant 63-year-old  gentleman with history of congestive heart failure, CAD, cardiomyopathy, LV ejection fraction about 30%, CKD stage 3/4, baseline creatinine about 2.5 mg/dL, was admitted to the hospital for elective to surgery, he underwent hemicolectomy and revision colostomy, patient's serum creatinine increased from a baseline of 2.5 to 3.4 mg/dL, his urine output also has been about 30 mL/hour, we were consulted for acute on chronic kidney failure, patient was hypotensive following his surgery requiring pressor support.    Interval History: Pt was seen and examined. No new c/o's, on minimal dose of bumex PO. No SOB, no leg swelling. Feels "comfortable." Has started to be fed clear liquids.    Review of patient's allergies indicates:  No Known Allergies  Current Facility-Administered Medications   Medication Frequency    acetaminophen tablet 1,000 mg Q8H    allopurinol tablet 100 mg Daily    bimatoprost 0.01 % ophthalmic drops 1 drop QHS    bumetanide tablet 1 mg Daily    carvedilol tablet 12.5 mg BID    dextrose 50% injection 12.5 g PRN    diphenhydrAMINE capsule 25 mg PRN    enoxaparin injection 30 mg Daily    glucagon (human recombinant) injection 1 mg PRN    HYDROmorphone injection 1 mg Q6H PRN    HYDROmorphone injection Syrg 0.5 mg Q1H PRN    insulin aspart U-100 pen 1-10 Units Q6H PRN    insulin detemir U-100 pen 12 Units Daily    metoclopramide HCl injection 5 mg Q6H PRN    nozaseptin (NOZIN) nasal  BID    ondansetron disintegrating tablet 8 mg Q8H PRN    pantoprazole EC tablet 40 mg QHS    simvastatin tablet 10 mg QHS    sodium " chloride 0.9% flush 3 mL PRN    sodium chloride 0.9% flush 5 mL PRN       Objective:     Vital Signs (Most Recent):  Temp: 98 °F (36.7 °C) (11/05/18 0735)  Pulse: 77 (11/05/18 0735)  Resp: 16 (11/05/18 0735)  BP: 129/70 (11/05/18 0735)  SpO2: 97 % (11/05/18 0735)  O2 Device (Oxygen Therapy): room air (11/05/18 0735) Vital Signs (24h Range):  Temp:  [98 °F (36.7 °C)-99.3 °F (37.4 °C)] 98 °F (36.7 °C)  Pulse:  [67-86] 77  Resp:  [16-18] 16  SpO2:  [97 %-99 %] 97 %  BP: (100-129)/(56-70) 129/70     Weight: 93.8 kg (206 lb 12.7 oz) (11/02/18 0700)  Body mass index is 30.99 kg/m².  Body surface area is 2.13 meters squared.    I/O last 3 completed shifts:  In: 120 [P.O.:120]  Out: 4660 [Urine:2100; Drains:50; Other:50; Stool:2460]    Physical Exam   Constitutional: He appears well-developed and well-nourished. No distress.   HENT:   Head: Normocephalic and atraumatic.   Neck: No JVD present.   Cardiovascular: Normal rate, regular rhythm and normal heart sounds. Exam reveals no friction rub.   Pulmonary/Chest: Effort normal and breath sounds normal. He has no rales.   Abdominal: Soft. Bowel sounds are normal.   Musculoskeletal: He exhibits edema.   Skin: Skin is warm and dry.   Psychiatric: He has a normal mood and affect. His behavior is normal.   Nursing note and vitals reviewed.      Significant Labs: reviewed  BMP  Lab Results   Component Value Date     11/03/2018     11/03/2018    K 3.6 11/03/2018    K 3.6 11/03/2018     11/03/2018     11/03/2018    CO2 25 11/03/2018    CO2 25 11/03/2018    BUN 48 (H) 11/03/2018    BUN 48 (H) 11/03/2018    CREATININE 2.1 (H) 11/03/2018    CREATININE 2.1 (H) 11/03/2018    CALCIUM 8.4 (L) 11/03/2018    CALCIUM 8.4 (L) 11/03/2018    ANIONGAP 8 11/03/2018    ANIONGAP 8 11/03/2018    ESTGFRAFRICA 37 (A) 11/03/2018    ESTGFRAFRICA 37 (A) 11/03/2018    EGFRNONAA 32 (A) 11/03/2018    EGFRNONAA 32 (A) 11/03/2018     Lab Results   Component Value Date    WBC 9.50  11/03/2018    HGB 9.3 (L) 11/03/2018    HCT 28.0 (L) 11/03/2018    MCV 87 11/03/2018     (L) 11/03/2018       Significant Imaging: reviewed CXR    Assessment/Plan:         64 y/o male with BROOKE on CKD post abdominal surgery:           BROOKE (acute kidney injury)     BROOKE on CKD stage 4: s Cr stable, not worse.  Stable renal function and improved  K normal  Acid base stable  Overall doing better clinically    Cause of BROOKE not clear, likely due to hemodynamic reasons following abdominal surgery     Hypertension - BP controlled  Meds reviewed     Anemia - multifactorial, s/p PRBC transfusion   Will monitor      Chronic combined systolic and diastolic congestive heart failure     Cardiac: has moderate to severe CHF.  EF is 30%  Has pulm edema on CXR  Re-started diuretics, bumex, at low dose 1 mg po qd  Fluid status stable, euvolemic  Will monitor fluid status         Plans and recommendations:  As discussed above.  Continue bumex at maintenance low dose 1 mg po qd to bid               Juan Luis Galeas MD  Nephrology  [facility]

## 2018-11-05 NOTE — PROGRESS NOTES
[facility] -   General Surgery  Progress Note    Subjective:     History of Present Illness:     Patient underwent a sigmoid resection for perforated diverticulitis.  He has had a colostomy since his surgery. Patient has developed a parastomal hernia.     The patient recently had some issues with his colostomy.  He was seen in the office.     The patient was evaluated by Cardiology was found to have a moderate to high risk of complications from a cardiac point of view with colostomy reversal     An echocardiogram showed an ejection fraction of 30-35%.  But there was decreased ventricular function.    Patient was also found to have a mass in the proximal transverse colon     He now presents for colostomy reversal, and resection of the colon mass           Post-Op Info:  Procedure(s) (LRB):  REVISION OR CLOSURE, COLOSTOMY (N/A)  HEMICOLECTOMY, RIGHT (Right)  MOBILIZATION, SPLENIC FLEXURE (N/A)  CREATION, ILEOSTOMY (N/A)   6 Days Post-Op     Interval History: tolerated clear, no nausea, would like to go home with home services    Medications:  Continuous Infusions:  Scheduled Meds:   acetaminophen  1,000 mg Oral Q8H    allopurinol  100 mg Oral Daily    bimatoprost  1 drop Both Eyes QHS    bumetanide  1 mg Oral Daily    carvedilol  12.5 mg Oral BID    enoxaparin  30 mg Subcutaneous Daily    insulin detemir U-100  12 Units Subcutaneous Daily    nozaseptin   Each Nare BID    pantoprazole  40 mg Oral QHS    simvastatin  10 mg Oral QHS     PRN Meds:dextrose 50%, diphenhydrAMINE, glucagon (human recombinant), HYDROmorphone, HYDROmorphone, insulin aspart U-100, metoclopramide HCl, ondansetron, sodium chloride 0.9%, sodium chloride 0.9%     Review of patient's allergies indicates:  No Known Allergies  Objective:     Vital Signs (Most Recent):  Temp: 98.9 °F (37.2 °C) (11/05/18 0326)  Pulse: 86 (11/05/18 0554)  Resp: 18 (11/05/18 0326)  BP: 112/61 (11/05/18 0326)  SpO2: 98 % (11/05/18 0326) Vital Signs  (24h Range):  Temp:  [98.3 °F (36.8 °C)-99.3 °F (37.4 °C)] 98.9 °F (37.2 °C)  Pulse:  [67-86] 86  Resp:  [18] 18  SpO2:  [97 %-100 %] 98 %  BP: (100-121)/(56-65) 112/61     Weight: 93.8 kg (206 lb 12.7 oz)  Body mass index is 30.99 kg/m².    Intake/Output - Last 3 Shifts       11/03 0700 - 11/04 0659 11/04 0700 - 11/05 0659 11/05 0700 - 11/06 0659    P.O. 60 120     I.V. (mL/kg) 550 (5.9)      Blood       Total Intake(mL/kg) 610 (6.5) 120 (1.3)     Urine (mL/kg/hr) 400 (0.2) 1700 (0.8)     Drains 230      Other  50     Stool 1000 1460     Total Output 1630 3210     Net -1020 -3090            Urine Occurrence 600 x            Physical Exam   Constitutional: No distress.   obese   HENT:   Head: Normocephalic and atraumatic.   Neck: Normal range of motion. Neck supple.   Cardiovascular: Normal rate, regular rhythm and normal heart sounds.   Pulmonary/Chest: Effort normal and breath sounds normal.   Abdominal: Soft. Bowel sounds are normal. Distention: mild.   Ileostomy with output ruq  Wound vac llq  incision is clean  Drain is serous   Musculoskeletal: Normal range of motion. He exhibits no edema.   Neurological: He is alert.   Skin: Skin is warm and dry. Capillary refill takes less than 2 seconds.   Psychiatric: He has a normal mood and affect. His behavior is normal. Judgment and thought content normal.   Vitals reviewed.      Significant Labs:  CBC:   Recent Labs   Lab 11/03/18  1317   WBC 9.50   RBC 3.22*   HGB 9.3*   HCT 28.0*   *   MCV 87   MCH 28.9   MCHC 33.2     BMP:   Recent Labs   Lab 11/02/18  0930 11/03/18  1317   * 165*  165*    137  137   K 3.5 3.6  3.6    104  104   CO2 24 25  25   BUN 44* 48*  48*   CREATININE 2.3* 2.1*  2.1*   CALCIUM 9.2 8.4*  8.4*   MG 1.5*  --        Significant Diagnostics:  none    Assessment/Plan:     * Ischemic cardiomyopathy    monitor         Open wound anterior abdominal wall    Would vac at home     BROOKE (acute kidney injury)    Acute on  chronic  Nephology has been consulted     History of colostomy reversal    Colostomy has been reversed, air leak on proctoscopy, protective ileostomy in place     Parastomal hernia without obstruction or gangrene    Parastomal hernia was closed at the time of colostomy reversal.  Local wound care.     Chronic combined systolic and diastolic congestive heart failure    Monitor       Colostomy in place    Colostomy has now been reversed  Wound care to colostomy site, wound care consult  Ileostomy output increasing, still somewhat distended  Full liquid diet will advance as tolerated  Ambulate     Anemia    Combination of acute blood loss and chronic disease and dilutional  Continue to monitor     S/P partial colectomy    Postop day 6 right hemicolectomy, colostomy reversal protective ileostomy.    ileostomy output     Biventricular ICD (implantable cardioverter-defibrillator) in place    Monitor with telemetry     CAD, multiple vessel    Continue to monitor     CKD (chronic kidney disease) stage 4, GFR 15-29 ml/min    Nephrology consultation continue to monitor     LBBB (left bundle branch block)    Monitor with telemetry     Hyperlipidemia associated with type 2 diabetes mellitus    Continue home medications for hyperlipidemia.  Insulin sliding scale for diabetes     Pulmonary HTN    Monitor     Hypertension associated with diabetes    Continue antihypertensives, nephrology has been consulted and they will address these as they deem appropriate       possible discharge tomorrow if home health in place and wound vac supplies available    Patient would prefer to go home with services  Kevin Lindsay MD  General Surgery  [facility]

## 2018-11-05 NOTE — ASSESSMENT & PLAN NOTE
Colostomy has now been reversed  Wound care to colostomy site, wound care consult  Ileostomy output increasing, still somewhat distended  Full liquid diet will advance as tolerated  Ambulate

## 2018-11-05 NOTE — PROGRESS NOTES
Follow up visit with Mr. Choudhury for Ileostomy management and wound vac dressing change. Patient denies pain. Assisted to reposition in bed.  RUQ Ileostomy noted, pouch balooning, full of gas and thin dark green/brown liquid effluent. Emptied at this time 500mL.  No leak noted.  ML abdominal incision intact and well approximated with sutures. Discussed Ileostomy care with patient and wife. Per wife, he currently uses a one piece flat pouch by [employer]. She states she changes his pouch, and is able to teach back the steps.    Discussed use of stoma ring with Ileostomy due to liquid effluent. Patient should be able to use same pouches at discharge that he was using on his colostomy.  Also discussed dietary guidelines for Ilestomy including increased fluid intact and avoiding nuts, seeds, mushrooms, and other foods that are high risk for blockage.also provided with written information.   Wound vac dressing change performed to LUQ old colostomy site. Wound vac paused and dressing removed. Wound irrigated with sterile saline and patted dry.  Cyndie wound skin intact, painted with cavilon.  Wound measures [id number].  1 piece black foam cut to size and applied to fill wound, and secured with drape. Sensatrac pad and tubing applied and attached to KCI wound vac ULTA at continuous -125 mmHg low intensity suction with good seal noted.  Patient will need HH at discharge for home wound vac and continued Ileostomy management.

## 2018-11-05 NOTE — PLAN OF CARE
Problem: Patient Care Overview  Goal: Plan of Care Review  Outcome: Ongoing (interventions implemented as appropriate)  No acute events overnight. Ileostomy with large amount of dark green output and gas. KINDRA drain with moderate amount of serosanguinous output. Wound vac in place. Dressings CDI, wound care provided as ordered. Pt denies any pain or n/v. Turning in bed independently. IS at bedside, encouraged use while awake. NSR on the monitor. Chart reviewed. Will monitor.

## 2018-11-05 NOTE — SUBJECTIVE & OBJECTIVE
"Interval History: Pt was seen and examined. No new c/o's, on minimal dose of bumex PO. No SOB, no leg swelling. Feels "comfortable." Has started to be fed clear liquids.    Review of patient's allergies indicates:  No Known Allergies  Current Facility-Administered Medications   Medication Frequency    acetaminophen tablet 1,000 mg Q8H    allopurinol tablet 100 mg Daily    bimatoprost 0.01 % ophthalmic drops 1 drop QHS    bumetanide tablet 1 mg Daily    carvedilol tablet 12.5 mg BID    dextrose 50% injection 12.5 g PRN    diphenhydrAMINE capsule 25 mg PRN    enoxaparin injection 30 mg Daily    glucagon (human recombinant) injection 1 mg PRN    HYDROmorphone injection 1 mg Q6H PRN    HYDROmorphone injection Syrg 0.5 mg Q1H PRN    insulin aspart U-100 pen 1-10 Units Q6H PRN    insulin detemir U-100 pen 12 Units Daily    metoclopramide HCl injection 5 mg Q6H PRN    nozaseptin (NOZIN) nasal  BID    ondansetron disintegrating tablet 8 mg Q8H PRN    pantoprazole EC tablet 40 mg QHS    simvastatin tablet 10 mg QHS    sodium chloride 0.9% flush 3 mL PRN    sodium chloride 0.9% flush 5 mL PRN       Objective:     Vital Signs (Most Recent):  Temp: 98 °F (36.7 °C) (11/05/18 0735)  Pulse: 77 (11/05/18 0735)  Resp: 16 (11/05/18 0735)  BP: 129/70 (11/05/18 0735)  SpO2: 97 % (11/05/18 0735)  O2 Device (Oxygen Therapy): room air (11/05/18 0735) Vital Signs (24h Range):  Temp:  [98 °F (36.7 °C)-99.3 °F (37.4 °C)] 98 °F (36.7 °C)  Pulse:  [67-86] 77  Resp:  [16-18] 16  SpO2:  [97 %-99 %] 97 %  BP: (100-129)/(56-70) 129/70     Weight: 93.8 kg (206 lb 12.7 oz) (11/02/18 0700)  Body mass index is 30.99 kg/m².  Body surface area is 2.13 meters squared.    I/O last 3 completed shifts:  In: 120 [P.O.:120]  Out: 4660 [Urine:2100; Drains:50; Other:50; Stool:2460]    Physical Exam   Constitutional: He appears well-developed and well-nourished. No distress.   HENT:   Head: Normocephalic and atraumatic.   Neck: No " JVD present.   Cardiovascular: Normal rate, regular rhythm and normal heart sounds. Exam reveals no friction rub.   Pulmonary/Chest: Effort normal and breath sounds normal. He has no rales.   Abdominal: Soft. Bowel sounds are normal.   Musculoskeletal: He exhibits edema.   Skin: Skin is warm and dry.   Psychiatric: He has a normal mood and affect. His behavior is normal.   Nursing note and vitals reviewed.      Significant Labs: reviewed  BMP  Lab Results   Component Value Date     11/03/2018     11/03/2018    K 3.6 11/03/2018    K 3.6 11/03/2018     11/03/2018     11/03/2018    CO2 25 11/03/2018    CO2 25 11/03/2018    BUN 48 (H) 11/03/2018    BUN 48 (H) 11/03/2018    CREATININE 2.1 (H) 11/03/2018    CREATININE 2.1 (H) 11/03/2018    CALCIUM 8.4 (L) 11/03/2018    CALCIUM 8.4 (L) 11/03/2018    ANIONGAP 8 11/03/2018    ANIONGAP 8 11/03/2018    ESTGFRAFRICA 37 (A) 11/03/2018    ESTGFRAFRICA 37 (A) 11/03/2018    EGFRNONAA 32 (A) 11/03/2018    EGFRNONAA 32 (A) 11/03/2018     Lab Results   Component Value Date    WBC 9.50 11/03/2018    HGB 9.3 (L) 11/03/2018    HCT 28.0 (L) 11/03/2018    MCV 87 11/03/2018     (L) 11/03/2018       Significant Imaging: reviewed CXR

## 2018-11-05 NOTE — PT/OT/SLP PROGRESS
Occupational Therapy      Patient Name:  Yan Choudhury   MRN:  [id number]    ATTEMPTED O.T. TX THIS AM, PT  [id number] TX., C/O FEELING POORLY, WILL ASSESS PT NEXT VISIT    Ronna Whitaker OT  11/5/2018   1125

## 2018-11-06 PROBLEM — K63.1 PERFORATED SIGMOID COLON: Status: ACTIVE | Noted: 2018-11-06

## 2018-11-06 LAB
ANION GAP SERPL CALC-SCNC: 12 MMOL/L
BASOPHILS # BLD AUTO: 0.02 K/UL
BASOPHILS NFR BLD: 0.2 %
BUN SERPL-MCNC: 39 MG/DL
CALCIUM SERPL-MCNC: 8.9 MG/DL
CHLORIDE SERPL-SCNC: 103 MMOL/L
CO2 SERPL-SCNC: 22 MMOL/L
CREAT SERPL-MCNC: 1.8 MG/DL
DIFFERENTIAL METHOD: ABNORMAL
EOSINOPHIL # BLD AUTO: 0.1 K/UL
EOSINOPHIL NFR BLD: 0.6 %
ERYTHROCYTE [DISTWIDTH] IN BLOOD BY AUTOMATED COUNT: 15.5 %
EST. GFR  (AFRICAN AMERICAN): 45 ML/MIN/1.73 M^2
EST. GFR  (NON AFRICAN AMERICAN): 39 ML/MIN/1.73 M^2
GLUCOSE SERPL-MCNC: 172 MG/DL
HCT VFR BLD AUTO: 29.9 %
HGB BLD-MCNC: 9.9 G/DL
LYMPHOCYTES # BLD AUTO: 1.4 K/UL
LYMPHOCYTES NFR BLD: 12.3 %
MCH RBC QN AUTO: 28.2 PG
MCHC RBC AUTO-ENTMCNC: 33.1 G/DL
MCV RBC AUTO: 85 FL
MONOCYTES # BLD AUTO: 1.1 K/UL
MONOCYTES NFR BLD: 9.3 %
NEUTROPHILS # BLD AUTO: 8.9 K/UL
NEUTROPHILS NFR BLD: 77.6 %
PLATELET # BLD AUTO: 208 K/UL
PMV BLD AUTO: 10.7 FL
POCT GLUCOSE: 125 MG/DL (ref 70–110)
POCT GLUCOSE: 125 MG/DL (ref 70–110)
POCT GLUCOSE: 157 MG/DL (ref 70–110)
POCT GLUCOSE: 186 MG/DL (ref 70–110)
POTASSIUM SERPL-SCNC: 3.5 MMOL/L
RBC # BLD AUTO: 3.51 M/UL
SODIUM SERPL-SCNC: 137 MMOL/L
WBC # BLD AUTO: 11.47 K/UL

## 2018-11-06 PROCEDURE — 80048 BASIC METABOLIC PNL TOTAL CA: CPT

## 2018-11-06 PROCEDURE — 25000003 PHARM REV CODE 250: Performed by: INTERNAL MEDICINE

## 2018-11-06 PROCEDURE — 94660 CPAP INITIATION&MGMT: CPT

## 2018-11-06 PROCEDURE — G8988 SELF CARE GOAL STATUS: HCPCS | Mod: CK

## 2018-11-06 PROCEDURE — 25000003 PHARM REV CODE 250: Performed by: SURGERY

## 2018-11-06 PROCEDURE — 85025 COMPLETE CBC W/AUTO DIFF WBC: CPT

## 2018-11-06 PROCEDURE — 97530 THERAPEUTIC ACTIVITIES: CPT

## 2018-11-06 PROCEDURE — 21400001 HC TELEMETRY ROOM

## 2018-11-06 PROCEDURE — 99233 SBSQ HOSP IP/OBS HIGH 50: CPT | Mod: ,,, | Performed by: INTERNAL MEDICINE

## 2018-11-06 PROCEDURE — G8987 SELF CARE CURRENT STATUS: HCPCS | Mod: CM

## 2018-11-06 PROCEDURE — 97116 GAIT TRAINING THERAPY: CPT

## 2018-11-06 PROCEDURE — 63600175 PHARM REV CODE 636 W HCPCS: Performed by: SURGERY

## 2018-11-06 PROCEDURE — 99900035 HC TECH TIME PER 15 MIN (STAT)

## 2018-11-06 RX ADMIN — ACETAMINOPHEN 1000 MG: 500 TABLET, FILM COATED ORAL at 01:11

## 2018-11-06 RX ADMIN — CARVEDILOL 12.5 MG: 12.5 TABLET, FILM COATED ORAL at 09:11

## 2018-11-06 RX ADMIN — INSULIN ASPART 2 UNITS: 100 INJECTION, SOLUTION INTRAVENOUS; SUBCUTANEOUS at 05:11

## 2018-11-06 RX ADMIN — INSULIN ASPART 2 UNITS: 100 INJECTION, SOLUTION INTRAVENOUS; SUBCUTANEOUS at 11:11

## 2018-11-06 RX ADMIN — ALLOPURINOL 100 MG: 100 TABLET ORAL at 09:11

## 2018-11-06 RX ADMIN — ACETAMINOPHEN 1000 MG: 500 TABLET, FILM COATED ORAL at 06:11

## 2018-11-06 RX ADMIN — BIMATOPROST 1 DROP: 0.1 SOLUTION/ DROPS OPHTHALMIC at 09:11

## 2018-11-06 RX ADMIN — BUMETANIDE 1 MG: 1 TABLET ORAL at 09:11

## 2018-11-06 RX ADMIN — ENOXAPARIN SODIUM 30 MG: 100 INJECTION SUBCUTANEOUS at 05:11

## 2018-11-06 RX ADMIN — SIMVASTATIN 10 MG: 5 TABLET, FILM COATED ORAL at 09:11

## 2018-11-06 RX ADMIN — PANTOPRAZOLE SODIUM 40 MG: 40 TABLET, DELAYED RELEASE ORAL at 09:11

## 2018-11-06 NOTE — PT/OT/SLP PROGRESS
Physical Therapy  Treatment    Yan Choudhury   MRN: [id number]   Admitting Diagnosis: Ischemic cardiomyopathy    PT Received On: 11/06/18  PT Start Time: 1035     PT Stop Time: 1100    PT Total Time (min): 25 min       Billable Minutes:  Gait Training 15 and Therapeutic Activity 10    Treatment Type: Treatment  PT/PTA: PT        General Precautions: Standard, fall  Orthopedic Precautions: N/A     Subjective:  Communicated with NURSE WANG prior to session.  Pain/Comfort  Pain Rating 1: 0/10    Objective:   Patient found with: wound vac, wayne catheter, colostomy, KINDRA drain, telemetry    Functional Mobility:  Therapeutic Activities and Exercises:  PT FOUND SEATED IN CHAIR UPON ARRIVAL, CGA FOR DONNING ROBE, CGA FOR SIT<>STAND TF, PT SLOW MOVING WITH ALL MOBILITY, CUES TO STAY ON TASK, PT ' WITH RW AND CGA, SLOW PACED STEADY GAIT, RETURN TO ROOM TO CHAIR, REVIEW BLE THEREX TO PERFORM WHILE SEATED IN CHAIR    AM-PAC 6 CLICK MOBILITY  How much help from another person does this patient currently need?   1 = Unable, Total/Dependent Assistance  2 = A lot, Maximum/Moderate Assistance  3 = A little, Minimum/Contact Guard/Supervision  4 = None, Modified [place]/Independent    Turning over in bed (including adjusting bedclothes, sheets and blankets)?: 4  Sitting down on and standing up from a chair with arms (e.g., wheelchair, bedside commode, etc.): 3  Moving from lying on back to sitting on the side of the bed?: 4  Moving to and from a bed to a chair (including a wheelchair)?: 3  Need to walk in hospital room?: 3  Climbing 3-5 steps with a railing?: 1  Basic Mobility Total Score: 18    AM-PAC Raw Score CMS G-Code Modifier Level of Impairment Assistance   6 % Total / Unable   7 - 9 CM 80 - 100% Maximal Assist   10 - 14 CL 60 - 80% Moderate Assist   15 - 19 CK 40 - 60% Moderate Assist   20 - 22 CJ 20 - 40% Minimal Assist   23 CI 1-20% SBA / CGA   24 CH 0% Independent/ Mod I     Patient left up in chair with all  lines intact, call button in reach, NURSE notified and WIFE present.    Assessment:  Yan Choudhury is a 63 y.o. male with a medical diagnosis of Ischemic cardiomyopathy and presents with IMPAIRED FUNCTIONAL MOBILITY. PT WILL BENEFIT FROM CONT. SKILLED P.T. TO ADDRESS IMPAIRMENTS    Rehab identified problem list/impairments: Rehab identified problem list/impairments: weakness, impaired endurance, impaired functional mobilty, gait instability, impaired balance, decreased coordination, decreased safety awareness    Rehab potential is good.    Activity tolerance: Good    Discharge recommendations: Discharge Facility/Level Of Care Needs: home health PT     Barriers to discharge:      Equipment recommendations: Equipment Needed After Discharge: bath bench, walker, rolling     GOALS:   Multidisciplinary Problems     Physical Therapy Goals        Problem: Physical Therapy Goal    Goal Priority Disciplines Outcome Goal Variances Interventions   Physical Therapy Goal     PT, PT/OT Ongoing (interventions implemented as appropriate)     Description:  LTG'S TO BE MET IN 7 DAYS (11-8-18)  1. PT WILL REQUIRE SBA FOR BED MOBILITY  2. PT WILL REQUIRE NATI FOR TF'S  3. PT WILL ' WITH RW AND NATI  4. PT WILL DEMO F DYNAMIC BALANCE DURING GAIT                    PLAN:    Patient to be seen 5 x/week  to address the above listed problems via gait training, therapeutic activities, therapeutic exercises  Plan of Care expires: 11/09/18  Plan of Care reviewed with: patient    Ilda Luis Fernando, PT  11/06/2018

## 2018-11-06 NOTE — PLAN OF CARE
Problem: Physical Therapy Goal  Goal: Physical Therapy Goal  LTG'S TO BE MET IN 7 DAYS (11-8-18)  1. PT WILL REQUIRE SBA FOR BED MOBILITY  2. PT WILL REQUIRE NATI FOR TF'S  3. PT WILL ' WITH RW AND NATI  4. PT WILL DEMO F DYNAMIC BALANCE DURING GAIT   Outcome: Ongoing (interventions implemented as appropriate)  PT WITH GOOD PARTICIPATION TODAY, CGA FOR TF AND GAIT USING RW

## 2018-11-06 NOTE — ASSESSMENT & PLAN NOTE
Parastomal hernia was closed at the time of colostomy reversal.  Wound VAC in place  Awaiting delivery of home wound VAC

## 2018-11-06 NOTE — SUBJECTIVE & OBJECTIVE
Interval History: Pt was seen and examined. No new c/o's, feeling better, no SOB. Pt is eating now. Surgery note was reviewed.    Review of patient's allergies indicates:  No Known Allergies  Current Facility-Administered Medications   Medication Frequency    acetaminophen tablet 1,000 mg Q8H    allopurinol tablet 100 mg Daily    bimatoprost 0.01 % ophthalmic drops 1 drop QHS    bumetanide tablet 1 mg Daily    carvedilol tablet 12.5 mg BID    dextrose 50% injection 12.5 g PRN    diphenhydrAMINE capsule 25 mg PRN    enoxaparin injection 30 mg Daily    glucagon (human recombinant) injection 1 mg PRN    HYDROmorphone injection 1 mg Q6H PRN    HYDROmorphone injection Syrg 0.5 mg Q1H PRN    insulin aspart U-100 pen 1-10 Units Q6H PRN    insulin detemir U-100 pen 12 Units Daily    metoclopramide HCl injection 5 mg Q6H PRN    ondansetron disintegrating tablet 8 mg Q8H PRN    pantoprazole EC tablet 40 mg QHS    simvastatin tablet 10 mg QHS    sodium chloride 0.9% flush 3 mL PRN       Objective:     Vital Signs (Most Recent):  Temp: 98.3 °F (36.8 °C) (11/06/18 1049)  Pulse: 78 (11/06/18 1049)  Resp: 18 (11/06/18 1049)  BP: 116/66 (11/06/18 1049)  SpO2: 99 % (11/06/18 1049)  O2 Device (Oxygen Therapy): nasal cannula (11/05/18 2142) Vital Signs (24h Range):  Temp:  [97.9 °F (36.6 °C)-99 °F (37.2 °C)] 98.3 °F (36.8 °C)  Pulse:  [76-86] 78  Resp:  [18-22] 18  SpO2:  [97 %-99 %] 99 %  BP: (109-140)/(56-72) 116/66     Weight: 95.4 kg (210 lb 5.1 oz) (11/05/18 2327)  Body mass index is 31.51 kg/m².  Body surface area is 2.15 meters squared.    I/O last 3 completed shifts:  In: 240 [P.O.:240]  Out: 3610 [Urine:1500; Drains:50; Other:50; Stool:2010]    Physical Exam   Constitutional: He appears well-developed and well-nourished. No distress.   HENT:   Head: Normocephalic and atraumatic.   Neck: No JVD present.   Cardiovascular: Normal rate, regular rhythm and normal heart sounds. Exam reveals no friction rub.    Pulmonary/Chest: Effort normal and breath sounds normal. He has no rales.   Abdominal: Soft. Bowel sounds are normal.   Musculoskeletal: He exhibits edema.   Skin: Skin is warm and dry.   Psychiatric: He has a normal mood and affect. His behavior is normal.   Nursing note and vitals reviewed.      Significant Labs: reviewed  BMP  Lab Results   Component Value Date     11/06/2018    K 3.5 11/06/2018     11/06/2018    CO2 22 (L) 11/06/2018    BUN 39 (H) 11/06/2018    CREATININE 1.8 (H) 11/06/2018    CALCIUM 8.9 11/06/2018    ANIONGAP 12 11/06/2018    ESTGFRAFRICA 45 (A) 11/06/2018    EGFRNONAA 39 (A) 11/06/2018

## 2018-11-06 NOTE — PT/OT/SLP PROGRESS
Occupational Therapy  Treatment    Yan Choudhury   MRN: [id number]   Admitting Diagnosis: Ischemic cardiomyopathy    OT Date of Treatment: 11/06/18   OT Start Time: 1045  OT Stop Time: 1100  OT Total Time (min): 15 min    Billable Minutes:  Therapeutic Activity 15    General Precautions: Standard, fall  Orthopedic Precautions: N/A  Braces: N/A         Subjective:  Communicated with NURSE WANG prior to session.    Pain/Comfort  Pain Rating 1: 0/10  Pain Rating Post-Intervention 1: 0/10    Objective:  Patient found with: peripheral IV, telemetry, wound vac     Functional Mobility:  Bed Mobility:       Transfers:        Functional Ambulation: PT ABULATED CGA WITH RW X200 FEET    Balance:   Static Sit: NORMAL: No deviations seen in posture held statically  Dynamic Sit: GOOD: Maintains balance through MODERATE excursions of active trunk movement  Static Stand: POOR+: Needs MINIMAL assist to maintain  Dynamic stand: POOR+: Needs MIN (minimal ) assist during gait    Therapeutic Activities and Exercises:  PT UP SEATED ON COUCH NEXT TO SPOUSE AT START OF THERAPY SESSION. PT STOOD FROM COUCH WITH CGA WITH RW. AMBULATED CGA WITH RW WITH WOUND VAC IN TOW X200 FEET. PT RETURNED BACK TO COUCH CGA WITH RW, SEATED WITH ALL LINES INTACT AND SPOUSE PRESENT. PT ONLY WILLING TO AMBULATE WITH THERAPY AT THIS TIME. LEFT WITH ALL NEEDS MET.    AM-PAC 6 CLICK ADL   How much help from another person does this patient currently need?   1 = Unable, Total/Dependent Assistance  2 = A lot, Maximum/Moderate Assistance  3 = A little, Minimum/Contact Guard/Supervision  4 = None, Modified [place]/Independent    Putting on and taking off regular lower body clothing? : 2  Bathing (including washing, rinsing, drying)?: 2  Toileting, which includes using toilet, bedpan, or urinal? : 2  Putting on and taking off regular upper body clothing?: 3  Taking care of personal grooming such as brushing teeth?: 3  Eating meals?: 4  Daily Activity Total Score: 16  "    AM-PAC Raw Score CMS "G-Code Modifier Level of Impairment Assistance   6 % Total / Unable   7 - 8 CM 80 - 100% Maximal Assist   9-13 CL 60 - 80% Moderate Assist   14 - 19 CK 40 - 60% Moderate Assist   20 - 22 CJ 20 - 40% Minimal Assist   23 CI 1-20% SBA / CGA   24 CH 0% Independent/ Mod I       Patient left up in chair with all lines intact, call button in reach and SPOUSE present    ASSESSMENT:  Yan Choudhury is a 63 y.o. male with a medical diagnosis of Ischemic cardiomyopathy and presents with DEBILITY, IMPAIRED ADLS, FUNCTIONAL MOBILITY AND IMPAIRED SAFETY AWARENESS. PT WILL BENEFIT FROM CONTINUED SKILLED OT SERVICES TO INCREASE FUNCTIONAL INDEPENDENCE AND SAFETY AWARENESS.    Rehab identified problem list/impairments: Rehab identified problem list/impairments: weakness, impaired endurance, impaired self care skills, impaired functional mobilty, impaired balance, decreased safety awareness    Rehab potential is good.    Activity tolerance: Good    Discharge recommendations: Discharge Facility/Level Of Care Needs: home health OT     Barriers to discharge: Barriers to Discharge: None    Equipment recommendations: bath bench, walker, rolling     GOALS:   Multidisciplinary Problems     Occupational Therapy Goals        Problem: Occupational Therapy Goal    Goal Priority Disciplines Outcome Interventions   Occupational Therapy Goal     OT, PT/OT Ongoing (interventions implemented as appropriate)    Description:  ot goals to be met by 11-11-18  Pt will tolerata 1 set x 20 reps b ue rom exercise  With min resistance  s with ue dressing  s with bsc t/f's                    Plan:  Patient to be seen 3 x/week to address the above listed problems via self-care/home management, therapeutic activities, therapeutic exercises  Plan of Care expires: 11/11/18  Plan of Care reviewed with: patient, spouse    OT G-codes  Functional Assessment Tool Used: [place] AM-PAC  Score: 16  Functional Limitation: Self care  Self Care " Current Status (): YORDAN  Self Care Goal Status (): JAMIE Roach OT  11/06/2018

## 2018-11-06 NOTE — PROGRESS NOTES
[facility] -   Nephrology  Progress Note    Patient Name: Yan Choudhury  MRN: [id number]  Admission Date: 10/30/2018  Hospital Length of Stay: 7 days  Attending Provider: Kevin Lindsay MD   Primary Care Physician: Sandra Estevez MD  Principal Problem:Ischemic cardiomyopathy    Subjective:     HPI: Yan Choudhury is a pleasant 63-year-old  gentleman with history of congestive heart failure, CAD, cardiomyopathy, LV ejection fraction about 30%, CKD stage 3/4, baseline creatinine about 2.5 mg/dL, was admitted to the hospital for elective to surgery, he underwent hemicolectomy and revision colostomy, patient's serum creatinine increased from a baseline of 2.5 to 3.4 mg/dL, his urine output also has been about 30 mL/hour, we were consulted for acute on chronic kidney failure, patient was hypotensive following his surgery requiring pressor support.    Interval History: Pt was seen and examined. No new c/o's, feeling better, no SOB. Pt is eating now. Surgery note was reviewed.    Review of patient's allergies indicates:  No Known Allergies  Current Facility-Administered Medications   Medication Frequency    acetaminophen tablet 1,000 mg Q8H    allopurinol tablet 100 mg Daily    bimatoprost 0.01 % ophthalmic drops 1 drop QHS    bumetanide tablet 1 mg Daily    carvedilol tablet 12.5 mg BID    dextrose 50% injection 12.5 g PRN    diphenhydrAMINE capsule 25 mg PRN    enoxaparin injection 30 mg Daily    glucagon (human recombinant) injection 1 mg PRN    HYDROmorphone injection 1 mg Q6H PRN    HYDROmorphone injection Syrg 0.5 mg Q1H PRN    insulin aspart U-100 pen 1-10 Units Q6H PRN    insulin detemir U-100 pen 12 Units Daily    metoclopramide HCl injection 5 mg Q6H PRN    ondansetron disintegrating tablet 8 mg Q8H PRN    pantoprazole EC tablet 40 mg QHS    simvastatin tablet 10 mg QHS    sodium chloride 0.9% flush 3 mL PRN       Objective:     Vital Signs (Most Recent):  Temp: 98.3 °F  (36.8 °C) (11/06/18 1049)  Pulse: 78 (11/06/18 1049)  Resp: 18 (11/06/18 1049)  BP: 116/66 (11/06/18 1049)  SpO2: 99 % (11/06/18 1049)  O2 Device (Oxygen Therapy): nasal cannula (11/05/18 2142) Vital Signs (24h Range):  Temp:  [97.9 °F (36.6 °C)-99 °F (37.2 °C)] 98.3 °F (36.8 °C)  Pulse:  [76-86] 78  Resp:  [18-22] 18  SpO2:  [97 %-99 %] 99 %  BP: (109-140)/(56-72) 116/66     Weight: 95.4 kg (210 lb 5.1 oz) (11/05/18 2327)  Body mass index is 31.51 kg/m².  Body surface area is 2.15 meters squared.    I/O last 3 completed shifts:  In: 240 [P.O.:240]  Out: 3610 [Urine:1500; Drains:50; Other:50; Stool:2010]    Physical Exam   Constitutional: He appears well-developed and well-nourished. No distress.   HENT:   Head: Normocephalic and atraumatic.   Neck: No JVD present.   Cardiovascular: Normal rate, regular rhythm and normal heart sounds. Exam reveals no friction rub.   Pulmonary/Chest: Effort normal and breath sounds normal. He has no rales.   Abdominal: Soft. Bowel sounds are normal.   Musculoskeletal: He exhibits edema.   Skin: Skin is warm and dry.   Psychiatric: He has a normal mood and affect. His behavior is normal.   Nursing note and vitals reviewed.      Significant Labs: reviewed  BMP  Lab Results   Component Value Date     11/06/2018    K 3.5 11/06/2018     11/06/2018    CO2 22 (L) 11/06/2018    BUN 39 (H) 11/06/2018    CREATININE 1.8 (H) 11/06/2018    CALCIUM 8.9 11/06/2018    ANIONGAP 12 11/06/2018    ESTGFRAFRICA 45 (A) 11/06/2018    EGFRNONAA 39 (A) 11/06/2018         Assessment/Plan:         62 y/o male with BROOKE on CKD post abdominal surgery:           BROOKE (acute kidney injury)     BROOKE on CKD stage 4: s Cr stable and continues to improved daily  Stable renal function and improved  K normal  Acid base stable  Overall doing better clinically     Cause of BROOKE not clear, likely due to hemodynamic reasons following abdominal surgery     Hypertension - BP controlled  Meds reviewed     Anemia -  multifactorial, s/p PRBC transfusion   Will monitor      Chronic combined systolic and diastolic congestive heart failure     Cardiac: has moderate to severe CHF.  EF is 30%  Has pulm edema on CXR  Re-started diuretics, bumex, at low dose 1 mg po qd  Fluid status stable, euvolemic  Will monitor fluid status            Perforated sigmoid colon    Surgery notes reviewed.  H/o of sigmoid resection for perforated diverticulitis  Complicated by a parastomal hernia, s/p closure  S/p colostomy  Mass in proximal transverse colon  S/p colostomy reversal, ileostomy in place  Wound vac in place       Plans and recommendations:  As discussed above      Juan Luis Galeas MD  Nephrology  [facility]

## 2018-11-06 NOTE — SUBJECTIVE & OBJECTIVE
Interval History:  Was a bit nauseated yesterday but this is improved, wound VAC form has been faxed, liquid ileostomy output    Medications:  Continuous Infusions:  Scheduled Meds:   acetaminophen  1,000 mg Oral Q8H    allopurinol  100 mg Oral Daily    bimatoprost  1 drop Both Eyes QHS    bumetanide  1 mg Oral Daily    carvedilol  12.5 mg Oral BID    enoxaparin  30 mg Subcutaneous Daily    insulin detemir U-100  12 Units Subcutaneous Daily    nozaseptin   Each Nare BID    pantoprazole  40 mg Oral QHS    simvastatin  10 mg Oral QHS     PRN Meds:dextrose 50%, diphenhydrAMINE, glucagon (human recombinant), HYDROmorphone, HYDROmorphone, insulin aspart U-100, metoclopramide HCl, ondansetron, sodium chloride 0.9%     Review of patient's allergies indicates:  No Known Allergies  Objective:     Vital Signs (Most Recent):  Temp: 99 °F (37.2 °C) (11/06/18 0730)  Pulse: 83 (11/06/18 0730)  Resp: 20 (11/06/18 0730)  BP: (!) 140/72 (11/06/18 0730)  SpO2: 98 % (11/06/18 0730) Vital Signs (24h Range):  Temp:  [97.9 °F (36.6 °C)-99 °F (37.2 °C)] 99 °F (37.2 °C)  Pulse:  [76-83] 83  Resp:  [18-22] 20  SpO2:  [97 %-99 %] 98 %  BP: (109-140)/(56-72) 140/72     Weight: 95.4 kg (210 lb 5.1 oz)  Body mass index is 31.51 kg/m².    Intake/Output - Last 3 Shifts       11/04 0700 - 11/05 0659 11/05 0700 - 11/06 0659 11/06 0700 - 11/07 0659    P.O. 120 240     I.V. (mL/kg)       Total Intake(mL/kg) 120 (1.3) 240 (2.5)     Urine (mL/kg/hr) 1700 (0.8) 100 (0)     Drains  50     Other 50      Stool 1460 1350     Total Output 3210 1500     Net -3090 -1260            Urine Occurrence  2 x           Physical Exam   Constitutional: No distress.   Slightly chronically ill   HENT:   Head: Normocephalic and atraumatic.   Eyes: No scleral icterus.   Neck: Normal range of motion. Neck supple.   Cardiovascular: Normal rate, regular rhythm and normal heart sounds.   Pulmonary/Chest: Effort normal and breath sounds normal.   Abdominal: Soft.  Bowel sounds are normal. He exhibits no distension. There is no tenderness.   Wound VAC to left lower quadrant, old colostomy site  Midline incision is clean  Ileostomy with liquid output left upper quadrant   Genitourinary: Penis normal.   Musculoskeletal: Normal range of motion. He exhibits no edema.   Skin: Skin is warm and dry. Capillary refill takes less than 2 seconds.   Psychiatric: He has a normal mood and affect. His behavior is normal. Thought content normal.   Vitals reviewed.      Significant Labs:  CBC:   Recent Labs   Lab 11/03/18  1317   WBC 9.50   RBC 3.22*   HGB 9.3*   HCT 28.0*   *   MCV 87   MCH 28.9   MCHC 33.2     BMP:   Recent Labs   Lab 11/02/18  0930 11/03/18  1317   * 165*  165*    137  137   K 3.5 3.6  3.6    104  104   CO2 24 25  25   BUN 44* 48*  48*   CREATININE 2.3* 2.1*  2.1*   CALCIUM 9.2 8.4*  8.4*   MG 1.5*  --      CMP:   Recent Labs   Lab 11/01/18  0350  11/03/18  1317   *  184*   < > 165*  165*   CALCIUM 7.9*  7.9*   < > 8.4*  8.4*   ALBUMIN 2.6*  --   --    PROT 5.4*  --   --      136   < > 137  137   K 3.9  3.9   < > 3.6  3.6   CO2 22*  22*   < > 25  25     106   < > 104  104   BUN 52*  52*   < > 48*  48*   CREATININE 3.2*  3.2*   < > 2.1*  2.1*   ALKPHOS 37*  --   --    ALT 9*  --   --    AST 24  --   --    BILITOT 0.6  --   --     < > = values in this interval not displayed.       Significant Diagnostics:  None

## 2018-11-06 NOTE — PLAN OF CARE
Problem: Patient Care Overview  Goal: Plan of Care Review  Outcome: Ongoing (interventions implemented as appropriate)  Pt on O2 tolerating well. No distress noted at this time. Pt refused cpap machine.

## 2018-11-06 NOTE — ASSESSMENT & PLAN NOTE
Postop day 7 right hemicolectomy, colostomy reversal protective ileostomy.    ileostomy output    Ileostomy output needs to become more solid.  Hopefully this will happen as we advanced his diet.  May require Metamucil or Imodium.    Check laboratory

## 2018-11-06 NOTE — PLAN OF CARE
Problem: Patient Care Overview  Goal: Plan of Care Review  Outcome: Ongoing (interventions implemented as appropriate)  Pt AAO x4.  VSS.  Pt remained afebrile throughout this shift.   Pt remained free of falls this shift.   Pt has no complaints of pain this shift.  Blood glucose monitored, corrected via SSI.  Plan of care reviewed. Patient verbalizes understanding.   Pt moving/turning independently. Frequent weight shifting encouraged. Assistance needed upon out of bed activities.  Patient NSR on monitor.   Wound vac, KINDRA drain, ileostomy, and large midline incision closely monitored, wound care provided.  Bed low, side rails up x 2, wheels locked, call light in reach.   Patient instructed to call for assistance.   Hourly rounding completed.   Will continue to monitor.

## 2018-11-06 NOTE — PLAN OF CARE
Problem: Patient Care Overview  Goal: Individualization & Mutuality  P.T. NOTE: PT CURRENTLY REQUIRES NATI FOR BED MOBILITY, MODA FOR TF'S   Outcome: Ongoing (interventions implemented as appropriate)  1730 Assumed care of pt d/t primary nurse leaving emergently.  Pt AAO x4.  Spouse at the bedside.  Pt requested stand-by assistance ambulating to the the restroom.  Pt was found to have RUQ ileostomy and LUQ wound vac clean, intact, WDL.  Lovenox injection administered as ordered.  Pt remained free of falls.   Pt had no c/o pain.   Plan of care reviewed with pt, spouse and oncoming nurse. Patient verbalizes understanding.   Pt moving/turning independently with stand-by assist.  Pt continues to rest quietly. HOB elevated.  Bed low, side rails up x 2, wheels locked, call light in reach.   Patient instructed to call for assistance.   Hourly rounding completed.   Will continue to monitor.

## 2018-11-06 NOTE — PROGRESS NOTES
[facility] -   General Surgery  Progress Note    Subjective:     History of Present Illness:     Patient underwent a sigmoid resection for perforated diverticulitis.  He has had a colostomy since his surgery. Patient has developed a parastomal hernia.     The patient recently had some issues with his colostomy.  He was seen in the office.     The patient was evaluated by Cardiology was found to have a moderate to high risk of complications from a cardiac point of view with colostomy reversal     An echocardiogram showed an ejection fraction of 30-35%.  But there was decreased ventricular function.    Patient was also found to have a mass in the proximal transverse colon     He now presents for colostomy reversal, and resection of the colon mass           Post-Op Info:  Procedure(s) (LRB):  REVISION OR CLOSURE, COLOSTOMY (N/A)  HEMICOLECTOMY, RIGHT (Right)  MOBILIZATION, SPLENIC FLEXURE (N/A)  CREATION, ILEOSTOMY (N/A)   7 Days Post-Op     Interval History:  Was a bit nauseated yesterday but this is improved, wound VAC form has been faxed, liquid ileostomy output    Medications:  Continuous Infusions:  Scheduled Meds:   acetaminophen  1,000 mg Oral Q8H    allopurinol  100 mg Oral Daily    bimatoprost  1 drop Both Eyes QHS    bumetanide  1 mg Oral Daily    carvedilol  12.5 mg Oral BID    enoxaparin  30 mg Subcutaneous Daily    insulin detemir U-100  12 Units Subcutaneous Daily    nozaseptin   Each Nare BID    pantoprazole  40 mg Oral QHS    simvastatin  10 mg Oral QHS     PRN Meds:dextrose 50%, diphenhydrAMINE, glucagon (human recombinant), HYDROmorphone, HYDROmorphone, insulin aspart U-100, metoclopramide HCl, ondansetron, sodium chloride 0.9%     Review of patient's allergies indicates:  No Known Allergies  Objective:     Vital Signs (Most Recent):  Temp: 99 °F (37.2 °C) (11/06/18 0730)  Pulse: 83 (11/06/18 0730)  Resp: 20 (11/06/18 0730)  BP: (!) 140/72 (11/06/18 0730)  SpO2: 98 % (11/06/18  0730) Vital Signs (24h Range):  Temp:  [97.9 °F (36.6 °C)-99 °F (37.2 °C)] 99 °F (37.2 °C)  Pulse:  [76-83] 83  Resp:  [18-22] 20  SpO2:  [97 %-99 %] 98 %  BP: (109-140)/(56-72) 140/72     Weight: 95.4 kg (210 lb 5.1 oz)  Body mass index is 31.51 kg/m².    Intake/Output - Last 3 Shifts       11/04 0700 - 11/05 0659 11/05 0700 - 11/06 0659 11/06 0700 - 11/07 0659    P.O. 120 240     I.V. (mL/kg)       Total Intake(mL/kg) 120 (1.3) 240 (2.5)     Urine (mL/kg/hr) 1700 (0.8) 100 (0)     Drains  50     Other 50      Stool 1460 1350     Total Output 3210 1500     Net -3090 -1260            Urine Occurrence  2 x           Physical Exam   Constitutional: No distress.   Slightly chronically ill   HENT:   Head: Normocephalic and atraumatic.   Eyes: No scleral icterus.   Neck: Normal range of motion. Neck supple.   Cardiovascular: Normal rate, regular rhythm and normal heart sounds.   Pulmonary/Chest: Effort normal and breath sounds normal.   Abdominal: Soft. Bowel sounds are normal. He exhibits no distension. There is no tenderness.   Wound VAC to left lower quadrant, old colostomy site  Midline incision is clean  Ileostomy with liquid output left upper quadrant   Genitourinary: Penis normal.   Musculoskeletal: Normal range of motion. He exhibits no edema.   Skin: Skin is warm and dry. Capillary refill takes less than 2 seconds.   Psychiatric: He has a normal mood and affect. His behavior is normal. Thought content normal.   Vitals reviewed.      Significant Labs:  CBC:   Recent Labs   Lab 11/03/18  1317   WBC 9.50   RBC 3.22*   HGB 9.3*   HCT 28.0*   *   MCV 87   MCH 28.9   MCHC 33.2     BMP:   Recent Labs   Lab 11/02/18  0930 11/03/18  1317   * 165*  165*    137  137   K 3.5 3.6  3.6    104  104   CO2 24 25  25   BUN 44* 48*  48*   CREATININE 2.3* 2.1*  2.1*   CALCIUM 9.2 8.4*  8.4*   MG 1.5*  --      CMP:   Recent Labs   Lab 11/01/18  0350  11/03/18  1317   *  184*   < > 165*   165*   CALCIUM 7.9*  7.9*   < > 8.4*  8.4*   ALBUMIN 2.6*  --   --    PROT 5.4*  --   --      136   < > 137  137   K 3.9  3.9   < > 3.6  3.6   CO2 22*  22*   < > 25  25     106   < > 104  104   BUN 52*  52*   < > 48*  48*   CREATININE 3.2*  3.2*   < > 2.1*  2.1*   ALKPHOS 37*  --   --    ALT 9*  --   --    AST 24  --   --    BILITOT 0.6  --   --     < > = values in this interval not displayed.       Significant Diagnostics:  None    Assessment/Plan:     * Ischemic cardiomyopathy    Cardiac ejection fraction 30s  Now appears to be making adequate urine.         Ileostomy in place    Monitor output.  May need Metamucil without additional liquid and/or Imodium.  Check lab     Open wound anterior abdominal wall    Would vac at home     BROOKE (acute kidney injury)    Acute on chronic, returning to his baseline  Nephology has been consulted     History of colostomy reversal    Colostomy has been reversed, air leak on proctoscopy, protective ileostomy in place     Parastomal hernia without obstruction or gangrene    Parastomal hernia was closed at the time of colostomy reversal.  Wound VAC in place  Awaiting delivery of home wound VAC     Chronic combined systolic and diastolic congestive heart failure    Monitor       Colostomy in place    Colostomy has now been reversed  Wound care to colostomy site, wound care consult  Ileostomy output increasing, still somewhat distended  Full liquid diet will advance as tolerated  Ambulate     Anemia    Combination of acute blood loss and chronic disease and dilutional  Continue to monitor     S/P partial colectomy    Postop day 7 right hemicolectomy, colostomy reversal protective ileostomy.    ileostomy output    Ileostomy output needs to become more solid.  Hopefully this will happen as we advanced his diet.  May require Metamucil or Imodium.    Check laboratory     Biventricular ICD (implantable cardioverter-defibrillator) in place    Monitor with  telemetry     CAD, multiple vessel    Stable, continue to monitor     CKD (chronic kidney disease) stage 4, GFR 15-29 ml/min    Nephrology consultation continue to monitor     LBBB (left bundle branch block)    Monitor with telemetry     Hyperlipidemia associated with type 2 diabetes mellitus    Continue home medications for hyperlipidemia.  Insulin sliding scale for diabetes     Pulmonary HTN    Monitor     Hypertension associated with diabetes    Continue antihypertensives, nephrology has been consulted and they will address these as they deem appropriate         Kevin Lindsay MD  General Surgery  [facility]

## 2018-11-06 NOTE — PLAN OF CARE
Attempted to contact Rosie Iraheta at [facility] ( [phone] ) to request eta for the wound vac. Left Message. [facility] will have Linda call back.    @ 1510 Spoke with Savannah at [facility] she stated that she sent all paperwork to [employer] ( medicare competitive bid ) . Obtained # for [employer] [phone]. Savannah stated that [employer] will process the order today.     @ 8464 Contacted Mihir at [employer] @ [phone], RO# [id number]. Per Mihir the vac has been approved and authorization  Given to [facility] for delivery of the vac today.     @ 7359 spoke with primary nurse, patient's vac has been delivered. Secure chat message sent to Dr Lindsay.

## 2018-11-06 NOTE — ASSESSMENT & PLAN NOTE
62 y/o male with BROOKE on CKD post abdominal surgery:           BROOKE (acute kidney injury)     BROOKE on CKD stage 4: s Cr stable, not worse.  Stable renal function and improved  K normal  Acid base stable  Overall doing better clinically     Cause of BROOKE not clear, likely due to hemodynamic reasons following abdominal surgery     Hypertension - BP controlled  Meds reviewed     Anemia - multifactorial, s/p PRBC transfusion   Will monitor      Chronic combined systolic and diastolic congestive heart failure     Cardiac: has moderate to severe CHF.  EF is 30%  Has pulm edema on CXR  Re-started diuretics, bumex, at low dose 1 mg po qd  Fluid status stable, euvolemic  Will monitor fluid status         Plans and recommendations:  As discussed above.  Continue bumex at maintenance low dose 1 mg po qd to bid

## 2018-11-06 NOTE — PLAN OF CARE
Problem: Occupational Therapy Goal  Goal: Occupational Therapy Goal  ot goals to be met by 11-11-18  Pt will tolerata 1 set x 20 reps b ue rom exercise  With min resistance  s with ue dressing  s with bsc t/f's   Outcome: Ongoing (interventions implemented as appropriate)  PT UP SEATED ON COUCH NEXT TO SPOUSE AT START OF THERAPY SESSION. PT STOOD FROM COUCH WITH CGA WITH RW. AMBULATED CGA WITH RW WITH WOUND VAC IN TOW X200 FEET. PT RETURNED BACK TO COUCH [facility] WITH RW, SEATED WITH ALL LINES INTACT AND SPOUSE PRESENT. PT ONLY WILLING TO AMBULATE WITH THERAPY AT THIS TIME. LEFT WITH ALL NEEDS MET.

## 2018-11-07 LAB
POCT GLUCOSE: 114 MG/DL (ref 70–110)
POCT GLUCOSE: 127 MG/DL (ref 70–110)
POCT GLUCOSE: 130 MG/DL (ref 70–110)
POCT GLUCOSE: 158 MG/DL (ref 70–110)

## 2018-11-07 PROCEDURE — 97110 THERAPEUTIC EXERCISES: CPT

## 2018-11-07 PROCEDURE — 63600175 PHARM REV CODE 636 W HCPCS: Performed by: SURGERY

## 2018-11-07 PROCEDURE — 99233 SBSQ HOSP IP/OBS HIGH 50: CPT | Mod: ,,, | Performed by: INTERNAL MEDICINE

## 2018-11-07 PROCEDURE — 25000003 PHARM REV CODE 250: Performed by: SURGERY

## 2018-11-07 PROCEDURE — 99900035 HC TECH TIME PER 15 MIN (STAT)

## 2018-11-07 PROCEDURE — 21400001 HC TELEMETRY ROOM

## 2018-11-07 PROCEDURE — 96372 THER/PROPH/DIAG INJ SC/IM: CPT

## 2018-11-07 PROCEDURE — 25000003 PHARM REV CODE 250: Performed by: INTERNAL MEDICINE

## 2018-11-07 RX ADMIN — ACETAMINOPHEN 1000 MG: 500 TABLET, FILM COATED ORAL at 09:11

## 2018-11-07 RX ADMIN — PSYLLIUM HUSK 1 PACKET: 3.4 POWDER ORAL at 12:11

## 2018-11-07 RX ADMIN — ACETAMINOPHEN 1000 MG: 500 TABLET, FILM COATED ORAL at 05:11

## 2018-11-07 RX ADMIN — ACETAMINOPHEN 1000 MG: 500 TABLET, FILM COATED ORAL at 01:11

## 2018-11-07 RX ADMIN — PANTOPRAZOLE SODIUM 40 MG: 40 TABLET, DELAYED RELEASE ORAL at 09:11

## 2018-11-07 RX ADMIN — BUMETANIDE 1 MG: 1 TABLET ORAL at 09:11

## 2018-11-07 RX ADMIN — ENOXAPARIN SODIUM 30 MG: 100 INJECTION SUBCUTANEOUS at 05:11

## 2018-11-07 RX ADMIN — CARVEDILOL 12.5 MG: 12.5 TABLET, FILM COATED ORAL at 09:11

## 2018-11-07 RX ADMIN — SIMVASTATIN 10 MG: 5 TABLET, FILM COATED ORAL at 09:11

## 2018-11-07 RX ADMIN — INSULIN ASPART 2 UNITS: 100 INJECTION, SOLUTION INTRAVENOUS; SUBCUTANEOUS at 05:11

## 2018-11-07 RX ADMIN — BIMATOPROST 1 DROP: 0.1 SOLUTION/ DROPS OPHTHALMIC at 09:11

## 2018-11-07 RX ADMIN — HYDROMORPHONE HYDROCHLORIDE 0.5 MG: 1 INJECTION, SOLUTION INTRAMUSCULAR; INTRAVENOUS; SUBCUTANEOUS at 08:11

## 2018-11-07 RX ADMIN — ALLOPURINOL 100 MG: 100 TABLET ORAL at 09:11

## 2018-11-07 NOTE — PROGRESS NOTES
11/07/18 0845   Handoff Report   Given To KRISTIN Altamirano   Pain/Comfort Assessments   Pain Assessment Performed Yes       Number Scale   Presence of Pain denies       Ileostomy 10/30/18 Loop RUQ   Placement Date: 10/30/18   Present Prior to Hospital Arrival?: Yes  Inserted by: MD  Ileostomy Type: Loop  Location: RUQ   Stoma Appearance red;round;moist;rosebud appearance   Stoma Size (in) 35mm   Appliance 1-piece;intact;no leakage;changed;per protocol/policy   Accessories/Skin Care skin sealant;skin barrier ring   Treatment Bag change;Site care   Stoma Function flatus;thin liquid   Peristomal Assessment Clean;Intact   Tolerance no signs/symptoms of discomfort;assisted w/ stoma care   Skin   Skin WDL ex   Trip Risk Assessment   Sensory Perception 3-->slightly limited   Moisture 4-->rarely moist   Activity 3-->walks occasionally   Mobility 3-->slightly limited   Nutrition 3-->adequate   Friction and Shear 3-->no apparent problem   Trip Score 19       Negative Pressure Wound Therapy    Placement Date/Time: 11/01/18 1100   Side: Left  Orientation: upper  Location: Abdomen   NPWT Type Vacuum Therapy   Therapy Setting NPWT Continuous therapy   Pressure Setting NPWT 125 mmHg   Therapy Interventions NPWT Dressing changed   Sponges Inserted NPWT Black;1   Sponges Removed NPWT Black;1       Wound 11/01/18 Other (comment) upper Abdomen   Date First Assessed: 11/01/18   Pre-existing: Yes  Primary Wound Type: (c) Other (comment)  Side: Left  Orientation: upper  Location: Abdomen   Wound WDL WDL   Dressing Appearance Dry;Intact   Drainage Amount Small   Drainage Characteristics/Odor Serosanguineous   Appearance Red;Granulating;Moist   Tissue loss description Full thickness   Periwound Area Dry;Intact   Wound Edges Open   Wound Length (cm) 3 cm   Wound Width (cm) 7 cm   Wound Depth (cm) 6 cm   Wound Volume (cm^3) 126 cm^3   Care Cleansed with:;Sterile normal saline;Applied:;Skin Barrier   Dressing Changed  (wound vac)   Dressing  Change Due 11/09/18       Follow up visit with Mr. Choudhury for Ileostomy management and wound vac dressing change. Patient denies pain. Student nurse Rafat present at bedside during care.  RUQ Ileostomy noted intact with thin dark green/brown liquid effluent. Emptied at this time. Pouch change performed at this time. Wife present at bedside. She and patient both have poor vision, however, a family member pre-cuts pouches for them. Discussed all steps to pouch change and ileostomy care with patient and wife and they can perform independently.  Pouch removed.Stoma is moist and pink, protruding from skin, healthy.  Cyndie stomal skin intact with no redness or breakdown.  Cleansed with saline. Ileostomy starr intact with sutures.  Painted with cavilon. Barrier ring applied. One piece sensura Conrado pouch cut to size and applied to Ileostomy. Patient tolerated well. Previous Ileostomy education reviewed.    Wound vac dressing change performed to LUQ old colostomy site. Wound vac paused and dressing removed. Wound irrigated with sterile saline and patted dry.  Cyndie wound skin intact, painted with cavilon.  Wound measures [id number].  1 piece black foam cut to size and applied to fill wound, and secured with drape. Sensatrac pad and tubing applied and attached to [employer]CI wound vac ULTA at continuous -125 mmHg low intensity suction with good seal noted. Home wound vac present at bedside. Education provided to patient and wife regarding management of home wound vac using teach back method. Demonstrated changing cannister and connecting tubing. Dicussed battery life, troubleshooting alarms, and hygiene care.  Patient ok to discharge from wound care standpoint with  for wound vac dressing changes and continued Ileostomy care. Next wound vac dressing change 11/9/18.

## 2018-11-07 NOTE — PROGRESS NOTES
[facility] -   Nephrology  Progress Note    Patient Name: Yan Choudhury  MRN: [id number]  Admission Date: 10/30/2018  Hospital Length of Stay: 8 days  Attending Provider: Kevin Lindsay MD   Primary Care Physician: Sandra Estevez MD  Principal Problem:Ischemic cardiomyopathy    Subjective:     HPI: Yan Choudhury is a pleasant 63-year-old  gentleman with history of congestive heart failure, CAD, cardiomyopathy, LV ejection fraction about 30%, CKD stage 3/4, baseline creatinine about 2.5 mg/dL, was admitted to the hospital for elective to surgery, he underwent hemicolectomy and revision colostomy, patient's serum creatinine increased from a baseline of 2.5 to 3.4 mg/dL, his urine output also has been about 30 mL/hour, we were consulted for acute on chronic kidney failure, patient was hypotensive following his surgery requiring pressor support.    Interval History: Pt was seen and examined. Has no new c/o's, stable last night, no SOB.    Review of patient's allergies indicates:  No Known Allergies  Current Facility-Administered Medications   Medication Frequency    acetaminophen tablet 1,000 mg Q8H    allopurinol tablet 100 mg Daily    bimatoprost 0.01 % ophthalmic drops 1 drop QHS    bumetanide tablet 1 mg Daily    carvedilol tablet 12.5 mg BID    dextrose 50% injection 12.5 g PRN    diphenhydrAMINE capsule 25 mg PRN    enoxaparin injection 30 mg Daily    glucagon (human recombinant) injection 1 mg PRN    HYDROmorphone injection 1 mg Q6H PRN    HYDROmorphone injection Syrg 0.5 mg Q1H PRN    insulin aspart U-100 pen 1-10 Units Q6H PRN    insulin detemir U-100 pen 12 Units Daily    metoclopramide HCl injection 5 mg Q6H PRN    ondansetron disintegrating tablet 8 mg Q8H PRN    pantoprazole EC tablet 40 mg QHS    simvastatin tablet 10 mg QHS    sodium chloride 0.9% flush 3 mL PRN       Objective:     Vital Signs (Most Recent):  Temp: 97.8 °F (36.6 °C) (11/07/18 0714)  Pulse: 89  (11/07/18 0738)  Resp: (!) 22 (11/07/18 0714)  BP: 121/70 (11/07/18 0714)  SpO2: 97 % (11/07/18 0714)  O2 Device (Oxygen Therapy): room air (11/07/18 0714) Vital Signs (24h Range):  Temp:  [97.6 °F (36.4 °C)-98.3 °F (36.8 °C)] 97.8 °F (36.6 °C)  Pulse:  [69-91] 89  Resp:  [18-22] 22  SpO2:  [97 %-100 %] 97 %  BP: ()/(55-70) 121/70     Weight: 92.7 kg (204 lb 5.9 oz) (11/06/18 2322)  Body mass index is 30.62 kg/m².  Body surface area is 2.12 meters squared.    I/O last 3 completed shifts:  In: 240 [P.O.:240]  Out: 3895 [Urine:575; Drains:100; Other:145; Stool:3075]    Physical Exam   Constitutional: He appears well-developed and well-nourished. No distress.   HENT:   Head: Normocephalic and atraumatic.   Neck: No JVD present.   Cardiovascular: Normal rate, regular rhythm and normal heart sounds. Exam reveals no friction rub.   Pulmonary/Chest: Effort normal and breath sounds normal. He has no rales.   Abdominal: Soft. Bowel sounds are normal.   Musculoskeletal: He exhibits edema.   Skin: Skin is warm and dry.   Psychiatric: He has a normal mood and affect. His behavior is normal.   Nursing note and vitals reviewed.      Significant Labs: reviewed  BMP  Lab Results   Component Value Date     11/06/2018    K 3.5 11/06/2018     11/06/2018    CO2 22 (L) 11/06/2018    BUN 39 (H) 11/06/2018    CREATININE 1.8 (H) 11/06/2018    CALCIUM 8.9 11/06/2018    ANIONGAP 12 11/06/2018    ESTGFRAFRICA 45 (A) 11/06/2018    EGFRNONAA 39 (A) 11/06/2018     Lab Results   Component Value Date    WBC 11.47 11/06/2018    HGB 9.9 (L) 11/06/2018    HCT 29.9 (L) 11/06/2018    MCV 85 11/06/2018     11/06/2018         Assessment/Plan:     BROOKE (acute kidney injury)    62 y/o male with BROOKE on CKD post abdominal surgery:           BROOKE (acute kidney injury)     BROOKE on CKD stage 4: s Cr stable, lower today.  Renal function is stable, improved   K normal  Acid base stable  Overall doing better clinically     Cause of BROOKE,  likely due to hemodynamic reasons following abdominal surgery     Hypertension - BP controlled  Meds reviewed     Anemia - multifactorial, s/p PRBC transfusion   Will monitor      Chronic combined systolic and diastolic congestive heart failure     Cardiac: has moderate to severe CHF.  EF is 30%  Has pulm edema on CXR  On maintenance dose of bumex, at low dose 1 mg po qd  Fluid status stable, euvolemic  Will monitor fluid status             Perforated sigmoid colon     Surgery notes reviewed.  H/o of sigmoid resection for perforated diverticulitis  Complicated by a parastomal hernia, s/p closure  S/p colostomy  Mass in proximal transverse colon  S/p colostomy reversal, ileostomy in place  Wound vac in place  Will defer to surgery         Plans and recommendations:  As discussed above             Juan Luis Galeas MD  Nephrology  [facility]

## 2018-11-07 NOTE — PLAN OF CARE
Problem: Physical Therapy Goal  Goal: Physical Therapy Goal  LTG'S TO BE MET IN 7 DAYS (11-8-18)  1. PT WILL REQUIRE SBA FOR BED MOBILITY  2. PT WILL REQUIRE NATI FOR TF'S  3. PT WILL ' WITH RW AND NATI  4. PT WILL DEMO F DYNAMIC BALANCE DURING GAIT   Outcome: Ongoing (interventions implemented as appropriate)  PATIENT WITH INCREASED PAIN AT PRESENT TIME, LIMITING HIS PARTICIPATION WITH THERAPY . PATIENT EDUCATED ON MAINTAINING GOOD PAIN CONTROL TO ENHANCE HIS RECOVERY PROCESS, PATIENT RECEPTIVE TO GABRIELA LE EXERCISES SUPINE .

## 2018-11-07 NOTE — PT/OT/SLP PROGRESS
Physical Therapy  Treatment    Yan Choudhury   MRN: [id number]   Admitting Diagnosis: Ischemic cardiomyopathy    PT Received On: 11/07/18  PT Start Time: 1015     PT Stop Time: 1030    PT Total Time (min): 15 min       Billable Minutes:  Therapeutic Exercise 15    Treatment Type: Treatment  PT/PTA: PTA     PTA Visit Number: 1       General Precautions: Standard, fall  Orthopedic Precautions: N/A   Braces: N/A         Subjective:  Communicated with Epic AND NURSEKATHLEEN prior to session.  PATIENT AGREE TO LIMITED TX DUE TO PAIN LEVEL ATPRESENT TIME.    Pain/Comfort  Pain Rating 1: 10/10  Location - Side 1: Bilateral  Location - Orientation 1: anterior  Location 1: abdomen  Pain Addressed 1: Pre-medicate for activity, Reposition, Distraction, Cessation of Activity(PATIENT EDUCATED ON UTILIZING PAIN CONTROL METHODS TO ENHANCE RECOVERY PROCESS.)  Pain Rating Post-Intervention 1: 10/10    Objective:   Patient found with: peripheral IV, telemetry, wound vac, SUPINE IN BED.    Functional Mobility:  Bed Mobility:    DECLINCED DUE TO CURRENT PAIN LEVEL.    Transfers:   AS ABOVE     Gait:    AS ABOVE.    Stairs:  N/A    Balance:   PATIENT DECLINED BALANCE ACTIVITY.    Therapeutic Activities and Exercises:  GABRIELA LE EXERCISES ,SUPINE ALL AVIALABLE PLANES, EDUCATED ON PATIENT ON PAIN CONTROL TO ENHANCE HIS RECOVERY.    AM-PAC 6 CLICK MOBILITY  How much help from another person does this patient currently need?   1 = Unable, Total/Dependent Assistance  2 = A lot, Maximum/Moderate Assistance  3 = A little, Minimum/Contact Guard/Supervision  4 = None, Modified [place]/Independent    Turning over in bed (including adjusting bedclothes, sheets and blankets)?: 4  Sitting down on and standing up from a chair with arms (e.g., wheelchair, bedside commode, etc.): 3  Moving from lying on back to sitting on the side of the bed?: 4  Moving to and from a bed to a chair (including a wheelchair)?: 1  Need to walk in hospital room?: 1  Climbing  3-5 steps with a railing?: 1  Basic Mobility Total Score: 14    AM-PAC Raw Score CMS G-Code Modifier Level of Impairment Assistance   6 % Total / Unable   7 - 9 CM 80 - 100% Maximal Assist   10 - 14 CL 60 - 80% Moderate Assist   15 - 19 CK 40 - 60% Moderate Assist   20 - 22 CJ 20 - 40% Minimal Assist   23 CI 1-20% SBA / CGA   24 CH 0% Independent/ Mod I     Patient left supine with all lines intact and call button in reach, WIFE PRESENT.    Assessment:  Yan Choudhury is a 63 y.o. male with a medical diagnosis of Ischemic cardiomyopathy ..    Rehab identified problem list/impairments: Rehab identified problem list/impairments: weakness, impaired self care skills, impaired endurance, impaired functional mobilty, impaired sensation, gait instability    Rehab potential is good.    Activity tolerance: Fair    Discharge recommendations: Discharge Facility/Level Of Care Needs: home health OT, home health PT     Barriers to discharge:      Equipment recommendations: Equipment Needed After Discharge: walker, rolling, bath bench     GOALS:   Multidisciplinary Problems     Physical Therapy Goals        Problem: Physical Therapy Goal    Goal Priority Disciplines Outcome Goal Variances Interventions   Physical Therapy Goal     PT, PT/OT Ongoing (interventions implemented as appropriate)     Description:  LTG'S TO BE MET IN 7 DAYS (11-8-18)  1. PT WILL REQUIRE SBA FOR BED MOBILITY  2. PT WILL REQUIRE NATI FOR TF'S  3. PT WILL ' WITH RW AND NATI  4. PT WILL DEMO F DYNAMIC BALANCE DURING GAIT                    PLAN:    Patient to be seen 5 x/week  to address the above listed problems via gait training, therapeutic activities, therapeutic exercises  Plan of Care expires: 11/09/18  Plan of Care reviewed with: patient         Lauren Waddell, PTA  11/07/2018

## 2018-11-07 NOTE — SUBJECTIVE & OBJECTIVE
Interval History: 2 liters ileostomy output    Medications:  Continuous Infusions:  Scheduled Meds:   acetaminophen  1,000 mg Oral Q8H    allopurinol  100 mg Oral Daily    bimatoprost  1 drop Both Eyes QHS    bumetanide  1 mg Oral Daily    carvedilol  12.5 mg Oral BID    enoxaparin  30 mg Subcutaneous Daily    insulin detemir U-100  12 Units Subcutaneous Daily    pantoprazole  40 mg Oral QHS    psyllium husk (aspartame)  1 packet Oral Daily    simvastatin  10 mg Oral QHS     PRN Meds:dextrose 50%, diphenhydrAMINE, glucagon (human recombinant), HYDROmorphone, HYDROmorphone, insulin aspart U-100, metoclopramide HCl, ondansetron, sodium chloride 0.9%     Review of patient's allergies indicates:  No Known Allergies  Objective:     Vital Signs (Most Recent):  Temp: 97.8 °F (36.6 °C) (11/07/18 0714)  Pulse: 89 (11/07/18 0738)  Resp: (!) 22 (11/07/18 0714)  BP: 121/70 (11/07/18 0714)  SpO2: 97 % (11/07/18 0714) Vital Signs (24h Range):  Temp:  [97.6 °F (36.4 °C)-98.2 °F (36.8 °C)] 97.8 °F (36.6 °C)  Pulse:  [69-91] 89  Resp:  [18-22] 22  SpO2:  [97 %-100 %] 97 %  BP: ()/(55-70) 121/70     Weight: 92.7 kg (204 lb 5.9 oz)  Body mass index is 30.62 kg/m².    Intake/Output - Last 3 Shifts       11/05 0700 - 11/06 0659 11/06 0700 - 11/07 0659 11/07 0700 - 11/08 0659    P.O. 240 240     Total Intake(mL/kg) 240 (2.5) 240 (2.6)     Urine (mL/kg/hr) 100 (0) 475 (0.2) 175 (0.5)    Drains 50 50     Other  145     Stool 1350 2225 150    Total Output 1500 2895 325    Net -1260 -2655 -325           Urine Occurrence 2 x            Physical Exam   Constitutional:   Chronically ill, nad   HENT:   Head: Normocephalic and atraumatic.   Eyes: No scleral icterus.   Neck: Normal range of motion. Neck supple.   Cardiovascular: Normal rate and regular rhythm.   Pulmonary/Chest: Effort normal and breath sounds normal.   Abdominal: Soft. Bowel sounds are normal.   ileostomy with out but  Drain with serous  Old colosotomy site with  vac  Wound is clean   Musculoskeletal: He exhibits no edema.   Neurological: He is alert.   Vitals reviewed.      Significant Labs:  CBC:   Recent Labs   Lab 11/06/18  1018   WBC 11.47   RBC 3.51*   HGB 9.9*   HCT 29.9*      MCV 85   MCH 28.2   MCHC 33.1     BMP:   Recent Labs   Lab 11/02/18  0930  11/06/18  1018   *   < > 172*      < > 137   K 3.5   < > 3.5      < > 103   CO2 24   < > 22*   BUN 44*   < > 39*   CREATININE 2.3*   < > 1.8*   CALCIUM 9.2   < > 8.9   MG 1.5*  --   --     < > = values in this interval not displayed.     CMP:   Recent Labs   Lab 11/01/18  0350  11/06/18  1018   *  184*   < > 172*   CALCIUM 7.9*  7.9*   < > 8.9   ALBUMIN 2.6*  --   --    PROT 5.4*  --   --      136   < > 137   K 3.9  3.9   < > 3.5   CO2 22*  22*   < > 22*     106   < > 103   BUN 52*  52*   < > 39*   CREATININE 3.2*  3.2*   < > 1.8*   ALKPHOS 37*  --   --    ALT 9*  --   --    AST 24  --   --    BILITOT 0.6  --   --     < > = values in this interval not displayed.       Significant Diagnostics:  none

## 2018-11-07 NOTE — PLAN OF CARE
Problem: Patient Care Overview  Goal: Plan of Care Review  Outcome: Ongoing (interventions implemented as appropriate)  POC discussed w/patient, verbalized understanding. NSR on monitor.   VSS. AAO X 4.   IJ intact. Medications administered as prescribed.  Patient turns independently in bed.    Ileostomy to RLQ clean, dry, and intact.   Midline abdominal incision dressing clean, dry, and intact.   Wound Vac to LLQ to suction clean, dry, and intact.     Fall precautions in place, call bell in reach, bed in low and locked position, spouse at bedside.   Patient remains free from falls/injuries.   Patient denies needs at this time.   Will continue to monitor.

## 2018-11-07 NOTE — PROGRESS NOTES
[facility] -   General Surgery  Progress Note    Subjective:     History of Present Illness:     Patient underwent a sigmoid resection for perforated diverticulitis.  He has had a colostomy since his surgery. Patient has developed a parastomal hernia.     The patient recently had some issues with his colostomy.  He was seen in the office.     The patient was evaluated by Cardiology was found to have a moderate to high risk of complications from a cardiac point of view with colostomy reversal     An echocardiogram showed an ejection fraction of 30-35%.  But there was decreased ventricular function.    Patient was also found to have a mass in the proximal transverse colon     He now presents for colostomy reversal, and resection of the colon mass           Post-Op Info:  Procedure(s) (LRB):  REVISION OR CLOSURE, COLOSTOMY (N/A)  HEMICOLECTOMY, RIGHT (Right)  MOBILIZATION, SPLENIC FLEXURE (N/A)  CREATION, ILEOSTOMY (N/A)   8 Days Post-Op     Interval History: 2 liters ileostomy output    Medications:  Continuous Infusions:  Scheduled Meds:   acetaminophen  1,000 mg Oral Q8H    allopurinol  100 mg Oral Daily    bimatoprost  1 drop Both Eyes QHS    bumetanide  1 mg Oral Daily    carvedilol  12.5 mg Oral BID    enoxaparin  30 mg Subcutaneous Daily    insulin detemir U-100  12 Units Subcutaneous Daily    pantoprazole  40 mg Oral QHS    psyllium husk (aspartame)  1 packet Oral Daily    simvastatin  10 mg Oral QHS     PRN Meds:dextrose 50%, diphenhydrAMINE, glucagon (human recombinant), HYDROmorphone, HYDROmorphone, insulin aspart U-100, metoclopramide HCl, ondansetron, sodium chloride 0.9%     Review of patient's allergies indicates:  No Known Allergies  Objective:     Vital Signs (Most Recent):  Temp: 97.8 °F (36.6 °C) (11/07/18 0714)  Pulse: 89 (11/07/18 0738)  Resp: (!) 22 (11/07/18 0714)  BP: 121/70 (11/07/18 0714)  SpO2: 97 % (11/07/18 0714) Vital Signs (24h Range):  Temp:  [97.6 °F (36.4  °C)-98.2 °F (36.8 °C)] 97.8 °F (36.6 °C)  Pulse:  [69-91] 89  Resp:  [18-22] 22  SpO2:  [97 %-100 %] 97 %  BP: ()/(55-70) 121/70     Weight: 92.7 kg (204 lb 5.9 oz)  Body mass index is 30.62 kg/m².    Intake/Output - Last 3 Shifts       11/05 0700 - 11/06 0659 11/06 0700 - 11/07 0659 11/07 0700 - 11/08 0659    P.O. 240 240     Total Intake(mL/kg) 240 (2.5) 240 (2.6)     Urine (mL/kg/hr) 100 (0) 475 (0.2) 175 (0.5)    Drains 50 50     Other  145     Stool 1350 2225 150    Total Output 1500 2895 325    Net -1260 -2655 -325           Urine Occurrence 2 x            Physical Exam   Constitutional:   Chronically ill, nad   HENT:   Head: Normocephalic and atraumatic.   Eyes: No scleral icterus.   Neck: Normal range of motion. Neck supple.   Cardiovascular: Normal rate and regular rhythm.   Pulmonary/Chest: Effort normal and breath sounds normal.   Abdominal: Soft. Bowel sounds are normal.   ileostomy with out but  Drain with serous  Old colosotomy site with vac  Wound is clean   Musculoskeletal: He exhibits no edema.   Neurological: He is alert.   Vitals reviewed.      Significant Labs:  CBC:   Recent Labs   Lab 11/06/18  1018   WBC 11.47   RBC 3.51*   HGB 9.9*   HCT 29.9*      MCV 85   MCH 28.2   MCHC 33.1     BMP:   Recent Labs   Lab 11/02/18  0930  11/06/18  1018   *   < > 172*      < > 137   K 3.5   < > 3.5      < > 103   CO2 24   < > 22*   BUN 44*   < > 39*   CREATININE 2.3*   < > 1.8*   CALCIUM 9.2   < > 8.9   MG 1.5*  --   --     < > = values in this interval not displayed.     CMP:   Recent Labs   Lab 11/01/18  0350  11/06/18  1018   *  184*   < > 172*   CALCIUM 7.9*  7.9*   < > 8.9   ALBUMIN 2.6*  --   --    PROT 5.4*  --   --      136   < > 137   K 3.9  3.9   < > 3.5   CO2 22*  22*   < > 22*     106   < > 103   BUN 52*  52*   < > 39*   CREATININE 3.2*  3.2*   < > 1.8*   ALKPHOS 37*  --   --    ALT 9*  --   --    AST 24  --   --    BILITOT 0.6  --   --      < > = values in this interval not displayed.       Significant Diagnostics:  none    Assessment/Plan:     * Ischemic cardiomyopathy    Cardiac ejection fraction 30s  Now appears to be making adequate urine.         Ileostomy in place    Monitor output.  May need Metamucil without additional liquid and/or Imodium.  Check lab     Open wound anterior abdominal wall    Would vac at home     BROOKE (acute kidney injury)    Acute on chronic, returning to his baseline  Nephology has been consulted     History of colostomy reversal    Colostomy has been reversed, air leak on proctoscopy, protective ileostomy in place     Parastomal hernia without obstruction or gangrene    Parastomal hernia was closed at the time of colostomy reversal.  Wound VAC in place  Awaiting delivery of home wound VAC     Chronic combined systolic and diastolic congestive heart failure    Monitor       Colostomy in place    Colostomy has now been reversed  Wound care to colostomy site, wound care consult  Ileostomy output increasing, still somewhat distended  Full liquid diet will advance as tolerated  Ambulate     Anemia    Combination of acute blood loss and chronic disease and dilutional  Continue to monitor     S/P partial colectomy    Postop day 2 right hemicolectomy, colostomy reversal protective ileostomy.    ileostomy output    Ileostomy output needs to become more solid.  Hopefully this will happen as we advanced his diet.  Add Metamucil   May need imodium    Check laboratory     Biventricular ICD (implantable cardioverter-defibrillator) in place    Monitor with telemetry     CAD, multiple vessel    Stable, continue to monitor     CKD (chronic kidney disease) stage 4, GFR 15-29 ml/min    Nephrology consultation continue to monitor     LBBB (left bundle branch block)    Monitor with telemetry     Hyperlipidemia associated with type 2 diabetes mellitus    Continue home medications for hyperlipidemia.  Insulin sliding scale for diabetes      Pulmonary HTN    Monitor     Hypertension associated with diabetes    Continue antihypertensives, nephrology has been consulted and they will address these as they deem appropriate         Kevin Lindsay MD  General Surgery  [facility]

## 2018-11-07 NOTE — ASSESSMENT & PLAN NOTE
Postop day 2 right hemicolectomy, colostomy reversal protective ileostomy.    ileostomy output    Ileostomy output needs to become more solid.  Hopefully this will happen as we advanced his diet.  Add Metamucil   May need imodium    Check laboratory

## 2018-11-07 NOTE — SUBJECTIVE & OBJECTIVE
Interval History: Pt was seen and examined. Has no new c/o's, stable last night, no SOB.    Review of patient's allergies indicates:  No Known Allergies  Current Facility-Administered Medications   Medication Frequency    acetaminophen tablet 1,000 mg Q8H    allopurinol tablet 100 mg Daily    bimatoprost 0.01 % ophthalmic drops 1 drop QHS    bumetanide tablet 1 mg Daily    carvedilol tablet 12.5 mg BID    dextrose 50% injection 12.5 g PRN    diphenhydrAMINE capsule 25 mg PRN    enoxaparin injection 30 mg Daily    glucagon (human recombinant) injection 1 mg PRN    HYDROmorphone injection 1 mg Q6H PRN    HYDROmorphone injection Syrg 0.5 mg Q1H PRN    insulin aspart U-100 pen 1-10 Units Q6H PRN    insulin detemir U-100 pen 12 Units Daily    metoclopramide HCl injection 5 mg Q6H PRN    ondansetron disintegrating tablet 8 mg Q8H PRN    pantoprazole EC tablet 40 mg QHS    simvastatin tablet 10 mg QHS    sodium chloride 0.9% flush 3 mL PRN       Objective:     Vital Signs (Most Recent):  Temp: 97.8 °F (36.6 °C) (11/07/18 0714)  Pulse: 89 (11/07/18 0738)  Resp: (!) 22 (11/07/18 0714)  BP: 121/70 (11/07/18 0714)  SpO2: 97 % (11/07/18 0714)  O2 Device (Oxygen Therapy): room air (11/07/18 0714) Vital Signs (24h Range):  Temp:  [97.6 °F (36.4 °C)-98.3 °F (36.8 °C)] 97.8 °F (36.6 °C)  Pulse:  [69-91] 89  Resp:  [18-22] 22  SpO2:  [97 %-100 %] 97 %  BP: ()/(55-70) 121/70     Weight: 92.7 kg (204 lb 5.9 oz) (11/06/18 2322)  Body mass index is 30.62 kg/m².  Body surface area is 2.12 meters squared.    I/O last 3 completed shifts:  In: 240 [P.O.:240]  Out: 3895 [Urine:575; Drains:100; Other:145; Stool:3075]    Physical Exam   Constitutional: He appears well-developed and well-nourished. No distress.   HENT:   Head: Normocephalic and atraumatic.   Neck: No JVD present.   Cardiovascular: Normal rate, regular rhythm and normal heart sounds. Exam reveals no friction rub.   Pulmonary/Chest: Effort normal and  breath sounds normal. He has no rales.   Abdominal: Soft. Bowel sounds are normal.   Musculoskeletal: He exhibits edema.   Skin: Skin is warm and dry.   Psychiatric: He has a normal mood and affect. His behavior is normal.   Nursing note and vitals reviewed.      Significant Labs: reviewed  BMP  Lab Results   Component Value Date     11/06/2018    K 3.5 11/06/2018     11/06/2018    CO2 22 (L) 11/06/2018    BUN 39 (H) 11/06/2018    CREATININE 1.8 (H) 11/06/2018    CALCIUM 8.9 11/06/2018    ANIONGAP 12 11/06/2018    ESTGFRAFRICA 45 (A) 11/06/2018    EGFRNONAA 39 (A) 11/06/2018     Lab Results   Component Value Date    WBC 11.47 11/06/2018    HGB 9.9 (L) 11/06/2018    HCT 29.9 (L) 11/06/2018    MCV 85 11/06/2018     11/06/2018

## 2018-11-07 NOTE — ASSESSMENT & PLAN NOTE
64 y/o male with BROOKE on CKD post abdominal surgery:           BROOKE (acute kidney injury)     BROOKE on CKD stage 4: s Cr stable and continues to improved daily  Stable renal function and improved  K normal  Acid base stable  Overall doing better clinically     Cause of BROOKE not clear, likely due to hemodynamic reasons following abdominal surgery     Hypertension - BP controlled  Meds reviewed     Anemia - multifactorial, s/p PRBC transfusion   Will monitor      Chronic combined systolic and diastolic congestive heart failure     Cardiac: has moderate to severe CHF.  EF is 30%  Has pulm edema on CXR  Re-started diuretics, bumex, at low dose 1 mg po qd  Fluid status stable, euvolemic  Will monitor fluid status             Perforated sigmoid colon     Surgery notes reviewed.  H/o of sigmoid resection for perforated diverticulitis  Complicated by a parastomal hernia, s/p closure  S/p colostomy  Mass in proximal transverse colon  S/p colostomy reversal, ileostomy in place  Wound vac in place         Plans and recommendations:  As discussed above

## 2018-11-07 NOTE — PLAN OF CARE
11/07/18 1415   Medicare Message   Important Message from Medicare regarding Discharge Appeal Rights Given to patient/caregiver;Explained to patient/caregiver;Signed/date by patient/caregiver   Date IMM was signed 11/07/18   Time IMM was signed 1419

## 2018-11-08 VITALS
BODY MASS INDEX: 30.27 KG/M2 | OXYGEN SATURATION: 99 % | HEART RATE: 79 BPM | SYSTOLIC BLOOD PRESSURE: 133 MMHG | HEIGHT: 69 IN | WEIGHT: 204.38 LBS | DIASTOLIC BLOOD PRESSURE: 70 MMHG | TEMPERATURE: 99 F | RESPIRATION RATE: 18 BRPM

## 2018-11-08 DIAGNOSIS — Z93.2 ILEOSTOMY IN PLACE: Primary | ICD-10-CM

## 2018-11-08 LAB
ANION GAP SERPL CALC-SCNC: 11 MMOL/L
BUN SERPL-MCNC: 39 MG/DL
CALCIUM SERPL-MCNC: 8.6 MG/DL
CHLORIDE SERPL-SCNC: 102 MMOL/L
CO2 SERPL-SCNC: 22 MMOL/L
CREAT SERPL-MCNC: 2 MG/DL
EST. GFR  (AFRICAN AMERICAN): 39 ML/MIN/1.73 M^2
EST. GFR  (NON AFRICAN AMERICAN): 34 ML/MIN/1.73 M^2
GLUCOSE SERPL-MCNC: 125 MG/DL
MAGNESIUM SERPL-MCNC: 1.6 MG/DL
PHOSPHATE SERPL-MCNC: 3.8 MG/DL
POCT GLUCOSE: 127 MG/DL (ref 70–110)
POCT GLUCOSE: 148 MG/DL (ref 70–110)
POTASSIUM SERPL-SCNC: 3.3 MMOL/L
SODIUM SERPL-SCNC: 135 MMOL/L

## 2018-11-08 PROCEDURE — 99233 SBSQ HOSP IP/OBS HIGH 50: CPT | Mod: ,,, | Performed by: INTERNAL MEDICINE

## 2018-11-08 PROCEDURE — 83735 ASSAY OF MAGNESIUM: CPT

## 2018-11-08 PROCEDURE — 84100 ASSAY OF PHOSPHORUS: CPT

## 2018-11-08 PROCEDURE — 63600175 PHARM REV CODE 636 W HCPCS: Performed by: SURGERY

## 2018-11-08 PROCEDURE — 25000003 PHARM REV CODE 250: Performed by: INTERNAL MEDICINE

## 2018-11-08 PROCEDURE — 80048 BASIC METABOLIC PNL TOTAL CA: CPT

## 2018-11-08 PROCEDURE — 25000003 PHARM REV CODE 250: Performed by: SURGERY

## 2018-11-08 RX ORDER — DIPHENOXYLATE HYDROCHLORIDE AND ATROPINE SULFATE 2.5; .025 MG/1; MG/1
1 TABLET ORAL 4 TIMES DAILY
Status: DISCONTINUED | OUTPATIENT
Start: 2018-11-08 | End: 2018-11-08 | Stop reason: HOSPADM

## 2018-11-08 RX ORDER — DIPHENOXYLATE HYDROCHLORIDE AND ATROPINE SULFATE 2.5; .025 MG/1; MG/1
1 TABLET ORAL 4 TIMES DAILY
Qty: 120 TABLET | Refills: 2 | Status: ON HOLD | OUTPATIENT
Start: 2018-11-08 | End: 2018-11-21 | Stop reason: SDUPTHER

## 2018-11-08 RX ORDER — HYDROCODONE BITARTRATE AND ACETAMINOPHEN 5; 325 MG/1; MG/1
TABLET ORAL
Qty: 20 TABLET | Refills: 0 | Status: SHIPPED | OUTPATIENT
Start: 2018-11-08 | End: 2019-01-31

## 2018-11-08 RX ADMIN — ALLOPURINOL 100 MG: 100 TABLET ORAL at 10:11

## 2018-11-08 RX ADMIN — DIPHENOXYLATE HYDROCHLORIDE AND ATROPINE SULFATE 1 TABLET: 2.5; .025 TABLET ORAL at 03:11

## 2018-11-08 RX ADMIN — DIPHENOXYLATE HYDROCHLORIDE AND ATROPINE SULFATE 1 TABLET: 2.5; .025 TABLET ORAL at 10:11

## 2018-11-08 RX ADMIN — BUMETANIDE 1 MG: 1 TABLET ORAL at 10:11

## 2018-11-08 RX ADMIN — CARVEDILOL 12.5 MG: 12.5 TABLET, FILM COATED ORAL at 10:11

## 2018-11-08 RX ADMIN — ACETAMINOPHEN 1000 MG: 500 TABLET, FILM COATED ORAL at 06:11

## 2018-11-08 RX ADMIN — PSYLLIUM HUSK 1 PACKET: 3.4 POWDER ORAL at 10:11

## 2018-11-08 NOTE — PROGRESS NOTES
"[facility] -   Nephrology  Progress Note    Patient Name: Yan Choudhury  MRN: [id number]  Admission Date: 10/30/2018  Hospital Length of Stay: 9 days  Attending Provider: Kevin Lindsay MD   Primary Care Physician: Sandra Estevez MD  Principal Problem:Ischemic cardiomyopathy    Subjective:     HPI: Yan Choudhury is a pleasant 63-year-old  gentleman with history of congestive heart failure, CAD, cardiomyopathy, LV ejection fraction about 30%, CKD stage 3/4, baseline creatinine about 2.5 mg/dL, was admitted to the hospital for elective to surgery, he underwent hemicolectomy and revision colostomy, patient's serum creatinine increased from a baseline of 2.5 to 3.4 mg/dL, his urine output also has been about 30 mL/hour, we were consulted for acute on chronic kidney failure, patient was hypotensive following his surgery requiring pressor support.    Interval History: Pt was seen and examined. Discussed with surgery. Pt is being discharged home. No new c/o's, no new events, no SOB. Feels "good." Advised pt and family to stay on low salt diet at home, his severe CHF h/o explained to them.    Review of patient's allergies indicates:  No Known Allergies  Current Facility-Administered Medications   Medication Frequency    acetaminophen tablet 1,000 mg Q8H    allopurinol tablet 100 mg Daily    bimatoprost 0.01 % ophthalmic drops 1 drop QHS    bumetanide tablet 1 mg Daily    carvedilol tablet 12.5 mg BID    dextrose 50% injection 12.5 g PRN    diphenhydrAMINE capsule 25 mg PRN    diphenoxylate-atropine 2.5-0.025 mg per tablet 1 tablet QID    enoxaparin injection 30 mg Daily    glucagon (human recombinant) injection 1 mg PRN    HYDROmorphone injection 1 mg Q6H PRN    HYDROmorphone injection Syrg 0.5 mg Q1H PRN    insulin aspart U-100 pen 1-10 Units Q6H PRN    insulin detemir U-100 pen 12 Units Daily    metoclopramide HCl injection 5 mg Q6H PRN    ondansetron disintegrating tablet 8 mg " Q8H PRN    pantoprazole EC tablet 40 mg QHS    psyllium husk (aspartame) 3.4 gram packet 1 packet Daily    simvastatin tablet 10 mg QHS    sodium chloride 0.9% flush 3 mL PRN       Objective:     Vital Signs (Most Recent):  Temp: 98 °F (36.7 °C) (11/08/18 0701)  Pulse: 79 (11/08/18 0701)  Resp: 19 (11/08/18 0701)  BP: 128/66 (11/08/18 0701)  SpO2: 100 % (11/08/18 0701)  O2 Device (Oxygen Therapy): room air (11/08/18 0701) Vital Signs (24h Range):  Temp:  [97.9 °F (36.6 °C)-99.2 °F (37.3 °C)] 98 °F (36.7 °C)  Pulse:  [68-84] 79  Resp:  [19-20] 19  SpO2:  [97 %-100 %] 100 %  BP: (108-128)/(56-66) 128/66     Weight: 92.7 kg (204 lb 5.9 oz) (11/06/18 2322)  Body mass index is 30.62 kg/m².  Body surface area is 2.12 meters squared.    I/O last 3 completed shifts:  In: 360 [P.O.:360]  Out: 2470 [Urine:850; Drains:25; Other:20; Stool:1575]    Physical Exam   Constitutional: He appears well-developed and well-nourished. No distress.   HENT:   Head: Normocephalic and atraumatic.   Neck: No JVD present.   Cardiovascular: Normal rate, regular rhythm and normal heart sounds. Exam reveals no friction rub.   Pulmonary/Chest: Effort normal and breath sounds normal. He has no rales.   Abdominal: Soft. Bowel sounds are normal.   Musculoskeletal: He exhibits edema.   Skin: Skin is warm and dry.   Psychiatric: He has a normal mood and affect. His behavior is normal.   Nursing note and vitals reviewed.      Significant Labs: reviewed  BMP  Lab Results   Component Value Date     (L) 11/08/2018    K 3.3 (L) 11/08/2018     11/08/2018    CO2 22 (L) 11/08/2018    BUN 39 (H) 11/08/2018    CREATININE 2.0 (H) 11/08/2018    CALCIUM 8.6 (L) 11/08/2018    ANIONGAP 11 11/08/2018    ESTGFRAFRICA 39 (A) 11/08/2018    EGFRNONAA 34 (A) 11/08/2018         Assessment/Plan:         64 y/o male with BROOKE on CKD post abdominal surgery:           BROOKE (acute kidney injury)     BROOKE on CKD stage 4: s Cr stable, unchanged  BROOKE resolved  Renal  function is stable  Overall doing better clinically     Cause of BROOKE, likely due to hemodynamic reasons following abdominal surgery     Hypertension - BP controlled  Meds reviewed     Anemia - multifactorial, s/p PRBC transfusion   Will monitor      Chronic combined systolic and diastolic congestive heart failure     Cardiac: has moderate to severe CHF.  EF is 30%  On maintenance dose of bumex, at low dose 1 mg po qd, please continue post d/c  Fluid status stable, euvolemic  Low salt intake at home 2-3 g/day, sources of salt in common foods described  Low to moderate fluid intake at home 1 to 1.5 L per day              Perforated sigmoid colon     Surgery notes reviewed.  H/o of sigmoid resection for perforated diverticulitis  Complicated by a parastomal hernia, s/p closure  S/p colostomy  Mass in proximal transverse colon  S/p colostomy reversal, ileostomy in place  Will defer to surgery         Plans and recommendations:  As discussed above  OK to d/c home from our view               Juan Luis Galeas MD  Nephrology  [facility]

## 2018-11-08 NOTE — SUBJECTIVE & OBJECTIVE
Interval History: tolerated a regular diet. Less ileotomy out put    Medications:  Continuous Infusions:  Scheduled Meds:   acetaminophen  1,000 mg Oral Q8H    allopurinol  100 mg Oral Daily    bimatoprost  1 drop Both Eyes QHS    bumetanide  1 mg Oral Daily    carvedilol  12.5 mg Oral BID    enoxaparin  30 mg Subcutaneous Daily    insulin detemir U-100  12 Units Subcutaneous Daily    pantoprazole  40 mg Oral QHS    psyllium husk (aspartame)  1 packet Oral Daily    simvastatin  10 mg Oral QHS     PRN Meds:dextrose 50%, diphenhydrAMINE, glucagon (human recombinant), HYDROmorphone, HYDROmorphone, insulin aspart U-100, metoclopramide HCl, ondansetron, sodium chloride 0.9%     Review of patient's allergies indicates:  No Known Allergies  Objective:     Vital Signs (Most Recent):  Temp: 98 °F (36.7 °C) (11/08/18 0701)  Pulse: 79 (11/08/18 0701)  Resp: 19 (11/08/18 0701)  BP: 128/66 (11/08/18 0701)  SpO2: 100 % (11/08/18 0701) Vital Signs (24h Range):  Temp:  [97.9 °F (36.6 °C)-99.2 °F (37.3 °C)] 98 °F (36.7 °C)  Pulse:  [68-84] 79  Resp:  [19-20] 19  SpO2:  [97 %-100 %] 100 %  BP: (108-128)/(56-66) 128/66     Weight: 92.7 kg (204 lb 5.9 oz)  Body mass index is 30.62 kg/m².    Intake/Output - Last 3 Shifts       11/06 0700 - 11/07 0659 11/07 0700 - 11/08 0659 11/08 0700 - 11/09 0659    P.O. 240 120     Total Intake(mL/kg) 240 (2.6) 120 (1.3)     Urine (mL/kg/hr) 475 (0.2) 750 (0.3) 100 (0.7)    Drains 50      Other 145      Stool 2225 1075 175    Total Output 2895 1825 275    Net -[phone]                 Physical Exam   Constitutional:   Chronically ill   HENT:   Head: Normocephalic and atraumatic.   Neck: Normal range of motion.   cvl   Cardiovascular: Normal rate.   Pulmonary/Chest: Effort normal and breath sounds normal.   Abdominal: Soft. Bowel sounds are normal.   Ileostomy with output. Wound vac  Dry dressing on wound   Musculoskeletal: Normal range of motion.   Neurological: He is alert.   Skin:  Skin is warm. Capillary refill takes less than 2 seconds.   Nursing note and vitals reviewed.      Significant Labs:  CBC:   Recent Labs   Lab 11/06/18  1018   WBC 11.47   RBC 3.51*   HGB 9.9*   HCT 29.9*      MCV 85   MCH 28.2   MCHC 33.1     BMP:   Recent Labs   Lab 11/08/18  0602   *   *   K 3.3*      CO2 22*   BUN 39*   CREATININE 2.0*   CALCIUM 8.6*   MG 1.6       Significant Diagnostics:  none

## 2018-11-08 NOTE — PLAN OF CARE
Problem: Patient Care Overview  Goal: Plan of Care Review  Outcome: Ongoing (interventions implemented as appropriate)  Patient AAOX4. Vitals stable, nsr on tele monitor. No complaints of pain. Blood glucose monitored. Will continue to monitor.

## 2018-11-08 NOTE — NURSING
R IJ TLC d/c'd. Patient being discharged. Wound Vac changed over to portable model. Other one brought down to . Denied questions or needs. Waiting for ride home.

## 2018-11-08 NOTE — DISCHARGE INSTRUCTIONS
Please call for any fever, increase in pain, nausea or vomiting or redness or drainage from incision(s).    No lifting more than 30 pounds for 2 weeks    May shower once dressings are removed  Remove steri strips as they begin to fall off    If you have more than 1.5 liters or 1.5 quarts of liquid output from your ileostomy place call    Bring your wound vac supplies to your clinic appointment

## 2018-11-08 NOTE — PHYSICIAN QUERY
PT Name: Yan Choudhury  MR #: [id number]    Physician Query Form - Pathology Findings Clarification     CDS/: Brandy E Capley               Contact information:  Spectralink:  [phone]  This form is a permanent document in the medical record.     Query Date: November 8, 2018      By submitting this query, we are merely seeking further clarification of documentation.  Please utilize your independent clinical judgment when addressing the question(s) below.      The medical record contains the following:     Findings Supporting Clinical Information Location in Medical Record     SPECIMEN  1) Right colon and terminal ileum.    FINAL PATHOLOGIC DIAGNOSIS  1  Right colon and terminal ileum:  Diverticulitis with transmural acute and chronic inflammation and fibrosis;  Inflammatory polyps;  Nineteen (19) reactive lymph nodes;  Appendix without significant histologic alteration.                             Date of Procedure: 10/30/2018      Procedure: Procedure(s) (LRB):  REVISION OR CLOSURE, COLOSTOMY   COLECTOMY, PARTIAL (Right)  MOBILIZATION, SPLENIC FLEXURE  CREATION, ILEOSTOMY      Post-Operative Diagnosis:      * Colostomy in place     * Parastomal hernia without obstruction or gangrene      Surgical pathology 10/30                        Op note 11/1     Please document the clinical significance of the Pathologists findings of __diverticulitis of right  colon and terminal  ileum_______.          [ x ] I agree with the Pathology Findings        [  ] I do not agree with the Pathology Findings        [  ] Clinically Undetermined        [  ] Other/Clarification of Findings: ______________________________________________    Please document in your progress notes daily for the duration of treatment until resolved and include in your discharge summary.

## 2018-11-08 NOTE — SUBJECTIVE & OBJECTIVE
"Interval History: Pt was seen and examined. Discussed with surgery. Pt is being discharged home. No new c/o's, no new events, no SOB. Feels "good." Advised pt and family to stay on low salt diet at home, his severe CHF h/o explained to them.    Review of patient's allergies indicates:  No Known Allergies  Current Facility-Administered Medications   Medication Frequency    acetaminophen tablet 1,000 mg Q8H    allopurinol tablet 100 mg Daily    bimatoprost 0.01 % ophthalmic drops 1 drop QHS    bumetanide tablet 1 mg Daily    carvedilol tablet 12.5 mg BID    dextrose 50% injection 12.5 g PRN    diphenhydrAMINE capsule 25 mg PRN    diphenoxylate-atropine 2.5-0.025 mg per tablet 1 tablet QID    enoxaparin injection 30 mg Daily    glucagon (human recombinant) injection 1 mg PRN    HYDROmorphone injection 1 mg Q6H PRN    HYDROmorphone injection Syrg 0.5 mg Q1H PRN    insulin aspart U-100 pen 1-10 Units Q6H PRN    insulin detemir U-100 pen 12 Units Daily    metoclopramide HCl injection 5 mg Q6H PRN    ondansetron disintegrating tablet 8 mg Q8H PRN    pantoprazole EC tablet 40 mg QHS    psyllium husk (aspartame) 3.4 gram packet 1 packet Daily    simvastatin tablet 10 mg QHS    sodium chloride 0.9% flush 3 mL PRN       Objective:     Vital Signs (Most Recent):  Temp: 98 °F (36.7 °C) (11/08/18 0701)  Pulse: 79 (11/08/18 0701)  Resp: 19 (11/08/18 0701)  BP: 128/66 (11/08/18 0701)  SpO2: 100 % (11/08/18 0701)  O2 Device (Oxygen Therapy): room air (11/08/18 0701) Vital Signs (24h Range):  Temp:  [97.9 °F (36.6 °C)-99.2 °F (37.3 °C)] 98 °F (36.7 °C)  Pulse:  [68-84] 79  Resp:  [19-20] 19  SpO2:  [97 %-100 %] 100 %  BP: (108-128)/(56-66) 128/66     Weight: 92.7 kg (204 lb 5.9 oz) (11/06/18 2322)  Body mass index is 30.62 kg/m².  Body surface area is 2.12 meters squared.    I/O last 3 completed shifts:  In: 360 [P.O.:360]  Out: 2470 [Urine:850; Drains:25; Other:20; Stool:1575]    Physical Exam   Constitutional: " He appears well-developed and well-nourished. No distress.   HENT:   Head: Normocephalic and atraumatic.   Neck: No JVD present.   Cardiovascular: Normal rate, regular rhythm and normal heart sounds. Exam reveals no friction rub.   Pulmonary/Chest: Effort normal and breath sounds normal. He has no rales.   Abdominal: Soft. Bowel sounds are normal.   Musculoskeletal: He exhibits edema.   Skin: Skin is warm and dry.   Psychiatric: He has a normal mood and affect. His behavior is normal.   Nursing note and vitals reviewed.      Significant Labs: reviewed  BMP  Lab Results   Component Value Date     (L) 11/08/2018    K 3.3 (L) 11/08/2018     11/08/2018    CO2 22 (L) 11/08/2018    BUN 39 (H) 11/08/2018    CREATININE 2.0 (H) 11/08/2018    CALCIUM 8.6 (L) 11/08/2018    ANIONGAP 11 11/08/2018    ESTGFRAFRICA 39 (A) 11/08/2018    EGFRNONAA 34 (A) 11/08/2018

## 2018-11-08 NOTE — PT/OT/SLP PROGRESS
Occupational Therapy      Patient Name:  Yan Choudhury   MRN:  [id number]    Patient not seen today secondary to pt refused  Due to anticipated discharged. Will follow-up on later date    Ronna Whitaker OT  11/8/2018   [id number]

## 2018-11-08 NOTE — PROGRESS NOTES
[facility] -   General Surgery  Progress Note    Subjective:     History of Present Illness:     Patient underwent a sigmoid resection for perforated diverticulitis.  He has had a colostomy since his surgery. Patient has developed a parastomal hernia.     The patient recently had some issues with his colostomy.  He was seen in the office.     The patient was evaluated by Cardiology was found to have a moderate to high risk of complications from a cardiac point of view with colostomy reversal     An echocardiogram showed an ejection fraction of 30-35%.  But there was decreased ventricular function.    Patient was also found to have a mass in the proximal transverse colon     He now presents for colostomy reversal, and resection of the colon mass           Post-Op Info:  Procedure(s) (LRB):  REVISION OR CLOSURE, COLOSTOMY (N/A)  HEMICOLECTOMY, RIGHT (Right)  MOBILIZATION, SPLENIC FLEXURE (N/A)  CREATION, ILEOSTOMY (N/A)   9 Days Post-Op     Interval History: tolerated a regular diet. Less ileotomy out put    Medications:  Continuous Infusions:  Scheduled Meds:   acetaminophen  1,000 mg Oral Q8H    allopurinol  100 mg Oral Daily    bimatoprost  1 drop Both Eyes QHS    bumetanide  1 mg Oral Daily    carvedilol  12.5 mg Oral BID    enoxaparin  30 mg Subcutaneous Daily    insulin detemir U-100  12 Units Subcutaneous Daily    pantoprazole  40 mg Oral QHS    psyllium husk (aspartame)  1 packet Oral Daily    simvastatin  10 mg Oral QHS     PRN Meds:dextrose 50%, diphenhydrAMINE, glucagon (human recombinant), HYDROmorphone, HYDROmorphone, insulin aspart U-100, metoclopramide HCl, ondansetron, sodium chloride 0.9%     Review of patient's allergies indicates:  No Known Allergies  Objective:     Vital Signs (Most Recent):  Temp: 98 °F (36.7 °C) (11/08/18 0701)  Pulse: 79 (11/08/18 0701)  Resp: 19 (11/08/18 0701)  BP: 128/66 (11/08/18 0701)  SpO2: 100 % (11/08/18 0701) Vital Signs (24h Range):  Temp:   [97.9 °F (36.6 °C)-99.2 °F (37.3 °C)] 98 °F (36.7 °C)  Pulse:  [68-84] 79  Resp:  [19-20] 19  SpO2:  [97 %-100 %] 100 %  BP: (108-128)/(56-66) 128/66     Weight: 92.7 kg (204 lb 5.9 oz)  Body mass index is 30.62 kg/m².    Intake/Output - Last 3 Shifts       11/06 0700 - 11/07 0659 11/07 0700 - 11/08 0659 11/08 0700 - 11/09 0659    P.O. 240 120     Total Intake(mL/kg) 240 (2.6) 120 (1.3)     Urine (mL/kg/hr) 475 (0.2) 750 (0.3) 100 (0.7)    Drains 50      Other 145      Stool 2225 1075 175    Total Output 2895 1825 275    Net -[phone] -[phone] -[phone]                 Physical Exam   Constitutional:   Chronically ill   HENT:   Head: Normocephalic and atraumatic.   Neck: Normal range of motion.   cvl   Cardiovascular: Normal rate.   Pulmonary/Chest: Effort normal and breath sounds normal.   Abdominal: Soft. Bowel sounds are normal.   Ileostomy with output. Wound vac  Dry dressing on wound   Musculoskeletal: Normal range of motion.   Neurological: He is alert.   Skin: Skin is warm. Capillary refill takes less than 2 seconds.   Nursing note and vitals reviewed.      Significant Labs:  CBC:   Recent Labs   Lab 11/06/18  1018   WBC 11.47   RBC 3.51*   HGB 9.9*   HCT 29.9*      MCV 85   MCH 28.2   MCHC 33.1     BMP:   Recent Labs   Lab 11/08/18  0602   *   *   K 3.3*      CO2 22*   BUN 39*   CREATININE 2.0*   CALCIUM 8.6*   MG 1.6       Significant Diagnostics:  none    Assessment/Plan:     * Ischemic cardiomyopathy    Cardiac ejection fraction 30s  Now appears to be making adequate urine.         Ileostomy in place    Monitor output.  May need Metamucil without additional liquid and/or Imodium.  Check lab     Open wound anterior abdominal wall    Would vac at home     BROOKE (acute kidney injury)    Acute on chronic, returning to his baseline  Nephology has been consulted     History of colostomy reversal    Colostomy has been reversed, air leak on proctoscopy, protective ileostomy in place     Parastomal  hernia without obstruction or gangrene    Parastomal hernia was closed at the time of colostomy reversal.  Wound VAC in place  Awaiting delivery of home wound VAC     Chronic combined systolic and diastolic congestive heart failure    Monitor       Colostomy in place    Colostomy has now been reversed  Wound care to colostomy site, wound care consult  Ileostomy output increasing, still somewhat distended  Full liquid diet will advance as tolerated  Ambulate     Anemia    Combination of acute blood loss and chronic disease and dilutional  Continue to monitor     S/P partial colectomy    Postop day 9 right hemicolectomy, colostomy reversal protective ileostomy.    ileostomy output    Ileostomy output needs to become more solid.  Hopefully this will happen as we advanced his diet.  Add Metamucil    imodium    Check laboratory    Discharge with home health     Biventricular ICD (implantable cardioverter-defibrillator) in place    Monitor with telemetry     CAD, multiple vessel    Stable, continue to monitor     CKD (chronic kidney disease) stage 4, GFR 15-29 ml/min    Nephrology consultation continue to monitor     LBBB (left bundle branch block)    Monitor with telemetry     Hyperlipidemia associated with type 2 diabetes mellitus    Continue home medications for hyperlipidemia.  Insulin sliding scale for diabetes     Pulmonary HTN    Monitor     Hypertension associated with diabetes    Continue antihypertensives, nephrology has been consulted and they will address these as they deem appropriate         Kevin Lindsay MD  General Surgery  [facility]

## 2018-11-08 NOTE — PT/OT/SLP PROGRESS
Physical Therapy      Patient Name:  Yan Choudhury   MRN:  [id number]    ATTEMPTED P.T. TX THIS AM, PT DECLINED DUE TO ABOUT TO D/C HOME    Ilda Gould, PT   11/8/2018  1496

## 2018-11-08 NOTE — ASSESSMENT & PLAN NOTE
Postop day 9 right hemicolectomy, colostomy reversal protective ileostomy.    ileostomy output    Ileostomy output needs to become more solid.  Hopefully this will happen as we advanced his diet.  Add Metamucil   lomotil    Check laboratory    Discharge with home health

## 2018-11-08 NOTE — ASSESSMENT & PLAN NOTE
64 y/o male with BROOKE on CKD post abdominal surgery:           BROOKE (acute kidney injury)     BROOKE on CKD stage 4: s Cr stable, lower today.  Renal function is stable, improved   K normal  Acid base stable  Overall doing better clinically     Cause of BROOKE, likely due to hemodynamic reasons following abdominal surgery     Hypertension - BP controlled  Meds reviewed     Anemia - multifactorial, s/p PRBC transfusion   Will monitor      Chronic combined systolic and diastolic congestive heart failure     Cardiac: has moderate to severe CHF.  EF is 30%  Has pulm edema on CXR  On maintenance dose of bumex, at low dose 1 mg po qd  Fluid status stable, euvolemic  Will monitor fluid status              Perforated sigmoid colon     Surgery notes reviewed.  H/o of sigmoid resection for perforated diverticulitis  Complicated by a parastomal hernia, s/p closure  S/p colostomy  Mass in proximal transverse colon  S/p colostomy reversal, ileostomy in place  Wound vac in place  Will defer to surgery         Plans and recommendations:  As discussed above

## 2018-11-09 ENCOUNTER — TELEPHONE (OUTPATIENT)
Dept: INTERNAL MEDICINE | Facility: CLINIC | Age: 63
End: 2018-11-09

## 2018-11-09 NOTE — PLAN OF CARE
11/09/18 0912   Final Note   Assessment Type Final Discharge Note   Anticipated Discharge Disposition Home-Health   Right Care Referral Info   Post Acute Recommendation Home-care   Facility Name [facility]

## 2018-11-09 NOTE — DISCHARGE SUMMARY
[facility] -   General Surgery  Discharge Summary      Patient Name: Yan Choudhury  MRN: [id number]  Admission Date: 10/30/2018  Hospital Length of Stay: 9 days  Discharge Date and Time: 11/8/2018  5:10 PM  Attending Physician: Vanesa att. providers found   Discharging Provider: Kevin Lindsay MD  Primary Care Provider: Sandra Estevez MD    HPI:      Patient underwent a sigmoid resection for perforated diverticulitis.  He has had a colostomy since his surgery. Patient has developed a parastomal hernia.     The patient recently had some issues with his colostomy.  He was seen in the office.     The patient was evaluated by Cardiology was found to have a moderate to high risk of complications from a cardiac point of view with colostomy reversal     An echocardiogram showed an ejection fraction of 30-35%.  But there was decreased ventricular function.    Patient was also found to have a mass in the proximal transverse colon     He now presents for colostomy reversal, and resection of the colon mass           Procedure(s) (LRB):  REVISION OR CLOSURE, COLOSTOMY (N/A)  HEMICOLECTOMY, RIGHT (Right)  MOBILIZATION, SPLENIC FLEXURE (N/A)  CREATION, ILEOSTOMY (N/A)      Indwelling Lines/Drains at time of discharge:   Lines/Drains/Airways     Drain                 Ileostomy 10/30/18 Loop RUQ 10 days         Closed/Suction Drain 10/30/18 1300 Abdomen Bulb 9 days          Pressure Ulcer                 Negative Pressure Wound Therapy  7 days              Hospital Course: Postop day 1.  Patient underwent a right hemicolectomy, colostomy reversal and protective ileostomy due to an air leak on proctoscopy.  He reports little to no pain. He has marginal urine output, there is acute on chronic renal failure.  He is requiring norepinephrine for blood pressure support.    POD 2 :  Off levophed, wbc improved, urine output improved, amenia - blood loss and chronic disease    POD3: pain controlled. Stoma pink. No flatus. No nausea.  Tolerated sips and ice chips    Pod 4:  Distended, minimal ostomy output, pain controlled, working with physical therapy    Pod 5:  Distended, increased ostomy output, ambulating with physical therapy    POD 6:  Guzman removed, ileostomy output, distention improve, no nausea, would like to go home with home services    Postop day 7.  Liquid ileostomy output.  Some nausea yesterday.  No labs in a few days.  Wound VAC form was faxed yesterday    POD 8 no nausea, 2 liters from the ileostomy, renal function is improved    POD 9 Tolerated a regular diet, less ileostomy output, becoming firm    Consults:   Consults (From admission, onward)        Status Ordering Provider     Inpatient consult to Nephrology  Once     Provider:  Hernandez Martinez MD    Completed RICARDO OCONNOR     Inpatient consult to Pulmonology  Once     Provider:  Donita Burgos NP    Completed RICARDO OCONNOR          Significant Diagnostic Studies:   Labs:   BMP:   Recent Labs   Lab 11/08/18  0602   *   *   K 3.3*      CO2 22*   BUN 39*   CREATININE 2.0*   CALCIUM 8.6*   MG 1.6   , CMP   Recent Labs   Lab 11/08/18  0602   *   K 3.3*      CO2 22*   *   BUN 39*   CREATININE 2.0*   CALCIUM 8.6*   ANIONGAP 11   ESTGFRAFRICA 39*   EGFRNONAA 34*    and CBC No results for input(s): WBC, HGB, HCT, PLT in the last 48 hours.  Specimen (12h ago, onward)    None        FINAL PATHOLOGIC DIAGNOSIS  1  Right colon and terminal ileum:  Diverticulitis with transmural acute and chronic inflammation and fibrosis;  Inflammatory polyps;  Nineteen (19) reactive lymph nodes;  Appendix without significant histologic alteration.  2  Rectal stump:  Rectal stump without significant histologic alteration.  3  End colostomy:  Colostomy stump without significant histologic alteration.  4  Anastomotic donuts:  Two anastomotic rings without significant histologic alteration .  OMCBR      Pending Diagnostic Studies:     None        Final Active  Diagnoses:    Diagnosis Date Noted POA    PRINCIPAL PROBLEM:  Ischemic cardiomyopathy [I25.5]  Yes    Perforated sigmoid colon [K63.1] 11/06/2018 Unknown    Ileostomy in place [Z93.2] 11/05/2018 Not Applicable    Open wound anterior abdominal wall [S31.109A] 11/01/2018 Yes    Nocturnal hypoxemia [G47.34] 11/01/2018 Unknown    BROOKE (acute kidney injury) [N17.9] 10/31/2018 No    History of colostomy reversal [Z98.890] 10/30/2018 Not Applicable    Parastomal hernia without obstruction or gangrene [K43.5] 09/11/2018 Yes    Obesity (BMI 30.0-34.9) [E66.9] 05/02/2018 Yes    Chronic combined systolic and diastolic congestive heart failure [I50.42] 03/06/2018 Yes    Type 2 diabetes mellitus with stage 4 chronic kidney disease, with long-term current use of insulin [E11.22, N18.4, Z79.4] 11/17/2016 Not Applicable    Colostomy in place [Z93.3] 05/30/2016 Not Applicable    S/P partial colectomy [Z90.49] 04/23/2016 Not Applicable    Anemia [D64.9] 04/23/2016 Yes    Biventricular ICD (implantable cardioverter-defibrillator) in place [Z95.810] 04/19/2016 Yes     Chronic    CAD, multiple vessel [I25.10] 07/13/2015 Yes     Chronic    CKD (chronic kidney disease) stage 4, GFR 15-29 ml/min [N18.4] 05/22/2015 Yes    LBBB (left bundle branch block) [I44.7] 03/16/2015 Yes    Hypertension associated with diabetes [E11.59, I10]  Yes    Pulmonary HTN [I27.20]  Yes    Hyperlipidemia associated with type 2 diabetes mellitus [E11.69, E78.5]  Yes      Problems Resolved During this Admission:    Diagnosis Date Noted Date Resolved POA    JAHAIRA on CPAP [G47.33, Z99.89] 11/17/2016 11/01/2018 Not Applicable     Chronic      Discharged Condition: stable    Disposition: Home or Self Care    Follow Up:  Follow-up Information     [facility].    Why:  Home Health  Contact information:  [address]  [facility] [id number] [phone]             [facility].    Specialty:  DME  Provider  Why:  DME  Contact information:  [address] [place] [address]  [phone]             Kevin Lindsay MD On 11/15/2018.    Specialty:  General Surgery  Contact information:  [address]  [place] [address] [phone]                 Patient Instructions:      Lifting restrictions   Order Comments: No lifting more than 30 pounds for 4 weeks     Notify your health care provider if you experience any of the following:  temperature >100.4     Notify your health care provider if you experience any of the following:  persistent nausea and vomiting or diarrhea     Notify your health care provider if you experience any of the following:  severe uncontrolled pain     Notify your health care provider if you experience any of the following:  redness, tenderness, or signs of infection (pain, swelling, redness, odor or green/yellow discharge around incision site)     Notify your health care provider if you experience any of the following:   Order Comments: More then 1.5 liters of 1.5 quarts of liquid ileostomy out put a day     Change dressing (specify)   Order Comments: Wound vac on Monday, Wednesday and friday     Medications:  Reconciled Home Medications:      Medication List      START taking these medications    diphenoxylate-atropine 2.5-0.025 mg 2.5-0.025 mg per tablet  Commonly known as:  LOMOTIL  Take 1 tablet by mouth 4 (four) times daily. for 10 days     HYDROcodone-acetaminophen 5-325 mg per tablet  Commonly known as:  NORCO  One or 2 every 4-6 hours as needed for pain     psyllium husk (aspartame) 3.4 gram Pwpk packet  Commonly known as:  METAMUCIL  Take 1 packet by mouth once daily.        CHANGE how you take these medications    allopurinol 100 MG tablet  Commonly known as:  ZYLOPRIM  TAKE 1 TABLET (100 MG TOTAL) BY MOUTH ONCE DAILY.  What changed:    · how much to take  · how to take this  · when to take this     bimatoprost 0.03 % ophthalmic drops  Commonly known as:   "LUMIGAN  Place 1 drop into the left eye every evening.  What changed:  how to take this     bumetanide 2 MG tablet  Commonly known as:  BUMEX  Take 1 tablet (2 mg total) by mouth 2 (two) times daily.  What changed:  when to take this     lisinopril 5 MG tablet  Commonly known as:  PRINIVIL,ZESTRIL  TAKE 1 TABLET (5 MG TOTAL) BY MOUTH ONCE DAILY.  What changed:  when to take this     pantoprazole 40 MG tablet  Commonly known as:  PROTONIX  TAKE 1 TABLET BY MOUTH EVERY DAY FOR ACID REFLUX  What changed:    · how much to take  · how to take this  · when to take this  · additional instructions        CONTINUE taking these medications    aspirin 81 MG EC tablet  Commonly known as:  ECOTRIN  Take 81 mg by mouth every morning.     BD INSULIN SYRINGE ULTRA-FINE 0.5 mL 31 gauge x 5/16" Syrg  Generic drug:  insulin syringe-needle U-100  USE AS DIRECTED TO INJECT INSULIN TWO TIMES DAILY     blood sugar diagnostic Strp  1 strip by Misc.(Non-Drug; Combo Route) route 4 (four) times daily. [employer]     carvedilol 25 MG tablet  Commonly known as:  COREG  TAKE 1/2 TABLET BY MOUTH TWICE A DAY WITH MEALS     GERITOL ORAL  Take by mouth.     insulin glargine 100 unit/mL injection  Commonly known as:  LANTUS  50 units bid prn when BS< 150     lancets Misc  For tel care     metOLazone 2.5 MG tablet  Commonly known as:  ZAROXOLYN  Take 1 tablet (2.5 mg total) by mouth every Mon, Wed, Fri.     pen needle, diabetic 32 gauge x 5/32" Ndle  Commonly known as:  BD ULTRA-FINE JOSLYN PEN NEEDLE  1 each by Misc.(Non-Drug; Combo Route) route 4 (four) times daily.     simvastatin 10 MG tablet  Commonly known as:  ZOCOR  TAKE 1 TABLET (10 MG TOTAL) BY MOUTH EVERY EVENING.     VITAMIN D3 1,000 unit capsule  Generic drug:  cholecalciferol (vitamin D3)  Take 1,000 Units by mouth once daily.        STOP taking these medications    metroNIDAZOLE 500 MG tablet  Commonly known as:  FLAGYL        ASK your doctor about these medications    neomycin 500 mg " Tab  Commonly known as:  MYCIFRADIN  Take 1 tablet (500 mg total) by mouth once. for 1 dose  Ask about: Should I take this medication?     potassium chloride SA 10 MEQ tablet  Commonly known as:  K-DUR,KLOR-CON  TAKE 1 TABLET BY MOUTH EVERY DAY          Time spent on the discharge of patient: 5 minutes    Kevin Lindsay MD  General Surgery  [facility] -

## 2018-11-09 NOTE — TELEPHONE ENCOUNTER
----- Message from Christiana Barnes sent at 11/9/2018  1:14 PM CST -----  Contact: rico quach  called rg status of clinic home notes req...[phone]

## 2018-11-11 RX ORDER — PANTOPRAZOLE SODIUM 40 MG/1
TABLET, DELAYED RELEASE ORAL
Qty: 90 TABLET | Refills: 1 | Status: SHIPPED | OUTPATIENT
Start: 2018-11-11 | End: 2019-06-29 | Stop reason: SDUPTHER

## 2018-11-13 ENCOUNTER — TELEPHONE (OUTPATIENT)
Dept: INTERNAL MEDICINE | Facility: CLINIC | Age: 63
End: 2018-11-13

## 2018-11-13 NOTE — TELEPHONE ENCOUNTER
----- Message from Bob Hagen sent at 11/13/2018  3:21 PM CST -----  Contact: [employer]   [phone]  Would like to follow-up with nurse regarding request for clinical notes.  Please call back at [phone].  Md Carlos

## 2018-11-14 ENCOUNTER — TELEPHONE (OUTPATIENT)
Dept: INTERNAL MEDICINE | Facility: CLINIC | Age: 63
End: 2018-11-14

## 2018-11-14 NOTE — TELEPHONE ENCOUNTER
----- Message from Lisa Laguerre sent at 11/14/2018  2:02 PM CST -----  Contact: [facility] [employer]  Caller needs call back,will elaborate [phone]

## 2018-11-15 ENCOUNTER — TELEPHONE (OUTPATIENT)
Dept: SURGERY | Facility: CLINIC | Age: 63
End: 2018-11-15

## 2018-11-15 ENCOUNTER — OFFICE VISIT (OUTPATIENT)
Dept: SURGERY | Facility: CLINIC | Age: 63
DRG: 862 | End: 2018-11-15
Payer: MEDICARE

## 2018-11-15 ENCOUNTER — HOSPITAL ENCOUNTER (INPATIENT)
Facility: HOSPITAL | Age: 63
LOS: 6 days | Discharge: HOME HEALTH | DRG: 862 | End: 2018-11-21
Attending: SURGERY | Admitting: SURGERY
Payer: MEDICARE

## 2018-11-15 ENCOUNTER — TELEPHONE (OUTPATIENT)
Dept: RADIOLOGY | Facility: HOSPITAL | Age: 63
End: 2018-11-15

## 2018-11-15 VITALS — WEIGHT: 204 LBS | BODY MASS INDEX: 30.57 KG/M2 | TEMPERATURE: 98 F

## 2018-11-15 DIAGNOSIS — E11.59 HYPERTENSION ASSOCIATED WITH DIABETES: ICD-10-CM

## 2018-11-15 DIAGNOSIS — N18.4 ACUTE RENAL FAILURE SUPERIMPOSED ON STAGE 4 CHRONIC KIDNEY DISEASE, UNSPECIFIED ACUTE RENAL FAILURE TYPE: ICD-10-CM

## 2018-11-15 DIAGNOSIS — I15.2 HYPERTENSION ASSOCIATED WITH DIABETES: ICD-10-CM

## 2018-11-15 DIAGNOSIS — Z79.4 TYPE 2 DIABETES MELLITUS WITH STAGE 4 CHRONIC KIDNEY DISEASE, WITH LONG-TERM CURRENT USE OF INSULIN: ICD-10-CM

## 2018-11-15 DIAGNOSIS — K65.1 PELVIC ABSCESS IN MALE: Primary | ICD-10-CM

## 2018-11-15 DIAGNOSIS — Z93.2 ILEOSTOMY IN PLACE: ICD-10-CM

## 2018-11-15 DIAGNOSIS — N17.9 ACUTE RENAL FAILURE SUPERIMPOSED ON STAGE 4 CHRONIC KIDNEY DISEASE, UNSPECIFIED ACUTE RENAL FAILURE TYPE: ICD-10-CM

## 2018-11-15 DIAGNOSIS — E11.22 TYPE 2 DIABETES MELLITUS WITH STAGE 4 CHRONIC KIDNEY DISEASE, WITH LONG-TERM CURRENT USE OF INSULIN: ICD-10-CM

## 2018-11-15 DIAGNOSIS — S31.109D OPEN WOUND OF ANTERIOR ABDOMINAL WALL WITH COMPLICATION, SUBSEQUENT ENCOUNTER: ICD-10-CM

## 2018-11-15 DIAGNOSIS — K65.1 PELVIC ABSCESS IN MALE: ICD-10-CM

## 2018-11-15 DIAGNOSIS — N18.4 TYPE 2 DIABETES MELLITUS WITH STAGE 4 CHRONIC KIDNEY DISEASE, WITH LONG-TERM CURRENT USE OF INSULIN: ICD-10-CM

## 2018-11-15 DIAGNOSIS — K91.89 GASTROINTESTINAL ANASTOMOTIC LEAK: Primary | ICD-10-CM

## 2018-11-15 DIAGNOSIS — I25.5 ISCHEMIC CARDIOMYOPATHY: ICD-10-CM

## 2018-11-15 DIAGNOSIS — N17.9 ACUTE ON CHRONIC RENAL FAILURE: ICD-10-CM

## 2018-11-15 DIAGNOSIS — N18.9 ACUTE ON CHRONIC RENAL FAILURE: ICD-10-CM

## 2018-11-15 LAB
BNP SERPL-MCNC: 134 PG/ML
CRP SERPL-MCNC: 67.3 MG/L
ERYTHROCYTE [SEDIMENTATION RATE] IN BLOOD BY WESTERGREN METHOD: 112 MM/HR
LACTATE SERPL-SCNC: 0.9 MMOL/L
PROCALCITONIN SERPL IA-MCNC: 0.47 NG/ML
URATE SERPL-MCNC: 16.4 MG/DL

## 2018-11-15 PROCEDURE — 84156 ASSAY OF PROTEIN URINE: CPT

## 2018-11-15 PROCEDURE — 63600175 PHARM REV CODE 636 W HCPCS: Performed by: NURSE PRACTITIONER

## 2018-11-15 PROCEDURE — 87040 BLOOD CULTURE FOR BACTERIA: CPT

## 2018-11-15 PROCEDURE — 36415 COLL VENOUS BLD VENIPUNCTURE: CPT

## 2018-11-15 PROCEDURE — 99214 OFFICE O/P EST MOD 30 MIN: CPT | Mod: PBBFAC,25 | Performed by: SURGERY

## 2018-11-15 PROCEDURE — S0030 INJECTION, METRONIDAZOLE: HCPCS | Performed by: SURGERY

## 2018-11-15 PROCEDURE — 83880 ASSAY OF NATRIURETIC PEPTIDE: CPT

## 2018-11-15 PROCEDURE — 84550 ASSAY OF BLOOD/URIC ACID: CPT

## 2018-11-15 PROCEDURE — 81003 URINALYSIS AUTO W/O SCOPE: CPT

## 2018-11-15 PROCEDURE — 25000003 PHARM REV CODE 250: Performed by: SURGERY

## 2018-11-15 PROCEDURE — 86140 C-REACTIVE PROTEIN: CPT

## 2018-11-15 PROCEDURE — 25500020 PHARM REV CODE 255: Performed by: SURGERY

## 2018-11-15 PROCEDURE — 84300 ASSAY OF URINE SODIUM: CPT

## 2018-11-15 PROCEDURE — 99999 PR PBB SHADOW E&M-EST. PATIENT-LVL IV: CPT | Mod: PBBFAC,,, | Performed by: SURGERY

## 2018-11-15 PROCEDURE — 99024 POSTOP FOLLOW-UP VISIT: CPT | Mod: POP,,, | Performed by: SURGERY

## 2018-11-15 PROCEDURE — 84145 PROCALCITONIN (PCT): CPT

## 2018-11-15 PROCEDURE — 83605 ASSAY OF LACTIC ACID: CPT

## 2018-11-15 PROCEDURE — 21400001 HC TELEMETRY ROOM

## 2018-11-15 PROCEDURE — G0426 INPT/ED TELECONSULT50: HCPCS | Mod: GT,,, | Performed by: INTERNAL MEDICINE

## 2018-11-15 PROCEDURE — 85651 RBC SED RATE NONAUTOMATED: CPT

## 2018-11-15 RX ORDER — DIPHENHYDRAMINE HYDROCHLORIDE 50 MG/ML
25 INJECTION INTRAMUSCULAR; INTRAVENOUS EVERY 4 HOURS PRN
Status: CANCELLED | OUTPATIENT
Start: 2018-11-15

## 2018-11-15 RX ORDER — ALLOPURINOL 100 MG/1
100 TABLET ORAL DAILY
Status: DISCONTINUED | OUTPATIENT
Start: 2018-11-16 | End: 2018-11-21 | Stop reason: HOSPADM

## 2018-11-15 RX ORDER — IBUPROFEN 200 MG
16 TABLET ORAL
Status: CANCELLED | OUTPATIENT
Start: 2018-11-15

## 2018-11-15 RX ORDER — IBUPROFEN 200 MG
24 TABLET ORAL
Status: DISCONTINUED | OUTPATIENT
Start: 2018-11-15 | End: 2018-11-15 | Stop reason: SDUPTHER

## 2018-11-15 RX ORDER — LIDOCAINE HYDROCHLORIDE 10 MG/ML
1 INJECTION, SOLUTION EPIDURAL; INFILTRATION; INTRACAUDAL; PERINEURAL ONCE
Status: CANCELLED | OUTPATIENT
Start: 2018-11-15 | End: 2018-11-15

## 2018-11-15 RX ORDER — SODIUM CHLORIDE 9 MG/ML
INJECTION, SOLUTION INTRAVENOUS CONTINUOUS
Status: DISCONTINUED | OUTPATIENT
Start: 2018-11-15 | End: 2018-11-21 | Stop reason: HOSPADM

## 2018-11-15 RX ORDER — CIPROFLOXACIN 2 MG/ML
400 INJECTION, SOLUTION INTRAVENOUS
Status: DISCONTINUED | OUTPATIENT
Start: 2018-11-15 | End: 2018-11-18

## 2018-11-15 RX ORDER — SODIUM CHLORIDE 0.9 % (FLUSH) 0.9 %
3 SYRINGE (ML) INJECTION
Status: DISCONTINUED | OUTPATIENT
Start: 2018-11-15 | End: 2018-11-15

## 2018-11-15 RX ORDER — SIMVASTATIN 5 MG/1
10 TABLET, FILM COATED ORAL NIGHTLY
Status: DISCONTINUED | OUTPATIENT
Start: 2018-11-15 | End: 2018-11-15

## 2018-11-15 RX ORDER — PANTOPRAZOLE SODIUM 40 MG/1
40 TABLET, DELAYED RELEASE ORAL DAILY
Status: DISCONTINUED | OUTPATIENT
Start: 2018-11-16 | End: 2018-11-21 | Stop reason: HOSPADM

## 2018-11-15 RX ORDER — METRONIDAZOLE 500 MG/100ML
500 INJECTION, SOLUTION INTRAVENOUS
Status: DISCONTINUED | OUTPATIENT
Start: 2018-11-15 | End: 2018-11-15

## 2018-11-15 RX ORDER — LISINOPRIL 5 MG/1
5 TABLET ORAL DAILY
Status: DISCONTINUED | OUTPATIENT
Start: 2018-11-16 | End: 2018-11-15

## 2018-11-15 RX ORDER — DIPHENOXYLATE HYDROCHLORIDE AND ATROPINE SULFATE 2.5; .025 MG/1; MG/1
1 TABLET ORAL 4 TIMES DAILY
Status: DISCONTINUED | OUTPATIENT
Start: 2018-11-15 | End: 2018-11-15

## 2018-11-15 RX ORDER — INSULIN ASPART 100 [IU]/ML
1-10 INJECTION, SOLUTION INTRAVENOUS; SUBCUTANEOUS
Status: DISCONTINUED | OUTPATIENT
Start: 2018-11-15 | End: 2018-11-15 | Stop reason: SDUPTHER

## 2018-11-15 RX ORDER — METRONIDAZOLE 500 MG/100ML
500 INJECTION, SOLUTION INTRAVENOUS
Status: DISCONTINUED | OUTPATIENT
Start: 2018-11-15 | End: 2018-11-19

## 2018-11-15 RX ORDER — LISINOPRIL 5 MG/1
5 TABLET ORAL DAILY
Status: DISCONTINUED | OUTPATIENT
Start: 2018-11-16 | End: 2018-11-16

## 2018-11-15 RX ORDER — INSULIN ASPART 100 [IU]/ML
1-10 INJECTION, SOLUTION INTRAVENOUS; SUBCUTANEOUS
Status: CANCELLED | OUTPATIENT
Start: 2018-11-15

## 2018-11-15 RX ORDER — ONDANSETRON 4 MG/1
8 TABLET, ORALLY DISINTEGRATING ORAL EVERY 8 HOURS PRN
Status: CANCELLED | OUTPATIENT
Start: 2018-11-15

## 2018-11-15 RX ORDER — HYDROCODONE BITARTRATE AND ACETAMINOPHEN 5; 325 MG/1; MG/1
1 TABLET ORAL EVERY 6 HOURS PRN
Status: DISCONTINUED | OUTPATIENT
Start: 2018-11-15 | End: 2018-11-15

## 2018-11-15 RX ORDER — GLUCAGON 1 MG
1 KIT INJECTION
Status: DISCONTINUED | OUTPATIENT
Start: 2018-11-15 | End: 2018-11-15 | Stop reason: SDUPTHER

## 2018-11-15 RX ORDER — INSULIN ASPART 100 [IU]/ML
1-10 INJECTION, SOLUTION INTRAVENOUS; SUBCUTANEOUS
Status: DISCONTINUED | OUTPATIENT
Start: 2018-11-15 | End: 2018-11-15

## 2018-11-15 RX ORDER — METOLAZONE 2.5 MG/1
2.5 TABLET ORAL
Status: DISCONTINUED | OUTPATIENT
Start: 2018-11-16 | End: 2018-11-16

## 2018-11-15 RX ORDER — INSULIN ASPART 100 [IU]/ML
1-10 INJECTION, SOLUTION INTRAVENOUS; SUBCUTANEOUS EVERY 6 HOURS PRN
Status: DISCONTINUED | OUTPATIENT
Start: 2018-11-15 | End: 2018-11-18

## 2018-11-15 RX ORDER — IBUPROFEN 200 MG
24 TABLET ORAL
Status: CANCELLED | OUTPATIENT
Start: 2018-11-15

## 2018-11-15 RX ORDER — GLUCAGON 1 MG
1 KIT INJECTION
Status: DISCONTINUED | OUTPATIENT
Start: 2018-11-15 | End: 2018-11-15

## 2018-11-15 RX ORDER — DIPHENHYDRAMINE HCL 25 MG
25 CAPSULE ORAL EVERY 4 HOURS PRN
Status: DISCONTINUED | OUTPATIENT
Start: 2018-11-15 | End: 2018-11-15

## 2018-11-15 RX ORDER — RAMELTEON 8 MG/1
8 TABLET ORAL NIGHTLY PRN
Status: DISCONTINUED | OUTPATIENT
Start: 2018-11-15 | End: 2018-11-15

## 2018-11-15 RX ORDER — CIPROFLOXACIN 2 MG/ML
400 INJECTION, SOLUTION INTRAVENOUS
Status: DISCONTINUED | OUTPATIENT
Start: 2018-11-15 | End: 2018-11-15

## 2018-11-15 RX ORDER — METRONIDAZOLE 500 MG/100ML
500 INJECTION, SOLUTION INTRAVENOUS
Status: CANCELLED | OUTPATIENT
Start: 2018-11-15

## 2018-11-15 RX ORDER — HYDROCODONE BITARTRATE AND ACETAMINOPHEN 5; 325 MG/1; MG/1
1 TABLET ORAL EVERY 6 HOURS PRN
Status: DISCONTINUED | OUTPATIENT
Start: 2018-11-15 | End: 2018-11-15 | Stop reason: SDUPTHER

## 2018-11-15 RX ORDER — CIPROFLOXACIN 2 MG/ML
400 INJECTION, SOLUTION INTRAVENOUS
Status: DISCONTINUED | OUTPATIENT
Start: 2018-11-15 | End: 2018-11-15 | Stop reason: SDUPTHER

## 2018-11-15 RX ORDER — CARVEDILOL 12.5 MG/1
25 TABLET ORAL DAILY
Status: DISCONTINUED | OUTPATIENT
Start: 2018-11-16 | End: 2018-11-15

## 2018-11-15 RX ORDER — CARVEDILOL 12.5 MG/1
25 TABLET ORAL DAILY
Status: DISCONTINUED | OUTPATIENT
Start: 2018-11-16 | End: 2018-11-17

## 2018-11-15 RX ORDER — ALLOPURINOL 100 MG/1
100 TABLET ORAL DAILY
Status: DISCONTINUED | OUTPATIENT
Start: 2018-11-16 | End: 2018-11-15

## 2018-11-15 RX ORDER — SIMVASTATIN 5 MG/1
10 TABLET, FILM COATED ORAL NIGHTLY
Status: DISCONTINUED | OUTPATIENT
Start: 2018-11-15 | End: 2018-11-21 | Stop reason: HOSPADM

## 2018-11-15 RX ORDER — SODIUM CHLORIDE 0.9 % (FLUSH) 0.9 %
3 SYRINGE (ML) INJECTION
Status: CANCELLED | OUTPATIENT
Start: 2018-11-15

## 2018-11-15 RX ORDER — IBUPROFEN 200 MG
24 TABLET ORAL
Status: DISCONTINUED | OUTPATIENT
Start: 2018-11-15 | End: 2018-11-15

## 2018-11-15 RX ORDER — DIPHENOXYLATE HYDROCHLORIDE AND ATROPINE SULFATE 2.5; .025 MG/1; MG/1
1 TABLET ORAL 4 TIMES DAILY
Status: DISCONTINUED | OUTPATIENT
Start: 2018-11-15 | End: 2018-11-15 | Stop reason: SDUPTHER

## 2018-11-15 RX ORDER — ONDANSETRON 2 MG/ML
4 INJECTION INTRAMUSCULAR; INTRAVENOUS EVERY 8 HOURS PRN
Status: DISCONTINUED | OUTPATIENT
Start: 2018-11-15 | End: 2018-11-21 | Stop reason: HOSPADM

## 2018-11-15 RX ORDER — SODIUM CHLORIDE 9 MG/ML
INJECTION, SOLUTION INTRAVENOUS CONTINUOUS
Status: DISCONTINUED | OUTPATIENT
Start: 2018-11-15 | End: 2018-11-15 | Stop reason: SDUPTHER

## 2018-11-15 RX ORDER — ACETAMINOPHEN 325 MG/1
650 TABLET ORAL EVERY 8 HOURS PRN
Status: DISCONTINUED | OUTPATIENT
Start: 2018-11-15 | End: 2018-11-15

## 2018-11-15 RX ORDER — METOLAZONE 2.5 MG/1
2.5 TABLET ORAL
Status: DISCONTINUED | OUTPATIENT
Start: 2018-11-16 | End: 2018-11-15

## 2018-11-15 RX ORDER — ACETAMINOPHEN 325 MG/1
650 TABLET ORAL EVERY 8 HOURS PRN
Status: CANCELLED | OUTPATIENT
Start: 2018-11-15

## 2018-11-15 RX ORDER — ACETAMINOPHEN 325 MG/1
650 TABLET ORAL EVERY 8 HOURS PRN
Status: DISCONTINUED | OUTPATIENT
Start: 2018-11-15 | End: 2018-11-15 | Stop reason: SDUPTHER

## 2018-11-15 RX ORDER — PANTOPRAZOLE SODIUM 40 MG/1
40 TABLET, DELAYED RELEASE ORAL DAILY
Status: DISCONTINUED | OUTPATIENT
Start: 2018-11-16 | End: 2018-11-15

## 2018-11-15 RX ORDER — GLUCAGON 1 MG
1 KIT INJECTION
Status: DISCONTINUED | OUTPATIENT
Start: 2018-11-15 | End: 2018-11-18 | Stop reason: SDUPTHER

## 2018-11-15 RX ORDER — RAMELTEON 8 MG/1
8 TABLET ORAL NIGHTLY PRN
Status: CANCELLED | OUTPATIENT
Start: 2018-11-15

## 2018-11-15 RX ORDER — SODIUM CHLORIDE 9 MG/ML
INJECTION, SOLUTION INTRAVENOUS CONTINUOUS
Status: CANCELLED | OUTPATIENT
Start: 2018-11-15

## 2018-11-15 RX ORDER — METRONIDAZOLE 500 MG/1
500 TABLET ORAL EVERY 12 HOURS
Qty: 32 TABLET | Refills: 0 | Status: ON HOLD | OUTPATIENT
Start: 2018-11-15 | End: 2018-11-21 | Stop reason: HOSPADM

## 2018-11-15 RX ORDER — IBUPROFEN 200 MG
16 TABLET ORAL
Status: DISCONTINUED | OUTPATIENT
Start: 2018-11-15 | End: 2018-11-15 | Stop reason: SDUPTHER

## 2018-11-15 RX ORDER — CIPROFLOXACIN 500 MG/1
500 TABLET ORAL EVERY 12 HOURS
Qty: 28 TABLET | Refills: 0 | Status: ON HOLD | OUTPATIENT
Start: 2018-11-15 | End: 2018-11-21 | Stop reason: SDUPTHER

## 2018-11-15 RX ORDER — ONDANSETRON 8 MG/1
8 TABLET, ORALLY DISINTEGRATING ORAL EVERY 8 HOURS PRN
Status: DISCONTINUED | OUTPATIENT
Start: 2018-11-15 | End: 2018-11-15

## 2018-11-15 RX ORDER — GLUCAGON 1 MG
1 KIT INJECTION
Status: CANCELLED | OUTPATIENT
Start: 2018-11-15

## 2018-11-15 RX ORDER — CIPROFLOXACIN 2 MG/ML
400 INJECTION, SOLUTION INTRAVENOUS
Status: CANCELLED | OUTPATIENT
Start: 2018-11-15

## 2018-11-15 RX ORDER — LIDOCAINE HYDROCHLORIDE 10 MG/ML
1 INJECTION, SOLUTION EPIDURAL; INFILTRATION; INTRACAUDAL; PERINEURAL ONCE
Status: DISCONTINUED | OUTPATIENT
Start: 2018-11-15 | End: 2018-11-21 | Stop reason: HOSPADM

## 2018-11-15 RX ORDER — IBUPROFEN 200 MG
16 TABLET ORAL
Status: DISCONTINUED | OUTPATIENT
Start: 2018-11-15 | End: 2018-11-15

## 2018-11-15 RX ADMIN — IOHEXOL 30 ML: 350 INJECTION, SOLUTION INTRAVENOUS at 10:11

## 2018-11-15 RX ADMIN — METRONIDAZOLE 500 MG: 500 INJECTION, SOLUTION INTRAVENOUS at 10:11

## 2018-11-15 RX ADMIN — SODIUM CHLORIDE: 0.9 INJECTION, SOLUTION INTRAVENOUS at 10:11

## 2018-11-15 RX ADMIN — CIPROFLOXACIN 400 MG: 2 INJECTION, SOLUTION INTRAVENOUS at 11:11

## 2018-11-15 RX ADMIN — SIMVASTATIN 10 MG: 5 TABLET, FILM COATED ORAL at 10:11

## 2018-11-15 NOTE — SUBJECTIVE & OBJECTIVE
Past Medical History:   Diagnosis Date    Arthritis     CAD (coronary artery disease)     Cataract     CHF (congestive heart failure)     Dr Calvillo    Chronic combined systolic and diastolic congestive heart failure 3/24/2015    CKD (chronic kidney disease), stage III     Diabetes mellitus type II      AM    Diabetic retinopathy     anti Veg F injections for macular edema    Diverticulitis of colon     ED (erectile dysfunction)     Glaucoma     HTN (hypertension)     Hyperlipidemia     Ischemic cardiomyopathy     Obesity     JAHAIRA on CPAP     Pacemaker     Pulmonary HTN        Past Surgical History:   Procedure Laterality Date    CARDIAC CATHETERIZATION  2009    CHOLECYSTECTOMY      COLECTOMY-SIGMOID N/A 4/22/2016    [facility] at [facility]    COLONOSCOPY N/A 10/11/2018    Procedure: COLONOSCOPY;  Surgeon: Quinn Aragon MD;  Location: [facility];  Service: General;  Laterality: N/A;    COLONOSCOPY N/A 10/11/2018    [facility]    COLOSTOMY N/A 4/22/2016    [facility]    CREATION, ILEOSTOMY N/A 10/30/2018    [facility]    EYE SURGERY      HEMICOLECTOMY, RIGHT Right 10/30/2018    [facility]    ILEOSTOMY N/A 10/30/2018    Procedure: CREATION, ILEOSTOMY;  Surgeon: Kevin Lindsay MD;  Location: [facility];  Service: General;  Laterality: N/A;    KNEE SURGERY      MOBILIZATION OF SPLENIC FLEXURE N/A 10/30/2018    Procedure: MOBILIZATION, SPLENIC FLEXURE;  Surgeon: Kevin Lindsay MD;  Location: [facility];  Service: General;  Laterality: N/A;    MOBILIZATION, SPLENIC FLEXURE N/A 10/30/2018    [facility]    REVISION COLOSTOMY N/A 10/30/2018    Procedure: REVISION OR CLOSURE, COLOSTOMY;  Surgeon: Kevin Lindsay MD;  Location: [facility];  Service: General;  Laterality: N/A;  Parastomal Hernia  Repair    REVISION OR CLOSURE, COLOSTOMY N/A 10/30/2018    Performed by Kevin Lindsay [facility] at [facility] OR    RIGHT HEMICOLECTOMY Right 10/30/2018    Procedure: HEMICOLECTOMY, RIGHT;  Surgeon: Kevin Lindsay MD;  Location: [facility] OR;  Service: General;  Laterality: Right;       Review of patient's allergies indicates:  No Known Allergies  No current facility-administered medications for this encounter.      Family History     Problem Relation (Age of Onset)    Cancer Mother    Heart disease Father    No Known Problems Sister, Brother, Maternal Aunt, Maternal Uncle, Paternal Aunt, Paternal Uncle, Maternal Grandmother, Maternal Grandfather, Paternal Grandmother, Paternal Grandfather    Stroke Father        Tobacco Use    Smoking status: Never Smoker    Smokeless tobacco: Never Used   Substance and Sexual Activity    Alcohol use: Yes     Alcohol/week: 0.0 oz     Comment: NO ALCOHOL 72 HOURS PRIOR TO SX    Drug use: No    Sexual activity: Not Currently     Review of Systems   Constitutional: Positive for activity change, appetite change, fatigue and unexpected weight change. Negative for chills, diaphoresis and fever.        Patient feels some tiredness and with the had some purulent material coming out of the drain about 2 days ago.  No fevers and chills.  I ileostomy output is formed.   HENT: Negative.  Negative for congestion and trouble swallowing.    Eyes: Negative.    Respiratory: Negative.  Negative for cough, chest tightness, shortness of breath and wheezing.    Cardiovascular: Negative.  Negative for chest pain, palpitations and leg swelling.   Gastrointestinal: Negative.  Negative for abdominal distention, abdominal pain, nausea and vomiting.   Genitourinary: Negative.  Negative for decreased urine volume, difficulty urinating, dysuria, enuresis, flank pain, frequency, hematuria, penile swelling, scrotal swelling and urgency.   Musculoskeletal: Negative.  Negative for arthralgias, back pain, joint  swelling and neck pain.   Skin: Negative for rash.   Neurological: Negative.  Negative for tremors, seizures and headaches.   Psychiatric/Behavioral: Negative.  Negative for confusion and sleep disturbance. The patient is not nervous/anxious.    All other systems reviewed and are negative.    Objective:     Vital Signs (Most Recent):    Vital Signs (24h Range):  Temp:  [97.5 °F (36.4 °C)-98 °F (36.7 °C)] 97.5 °F (36.4 °C)  Pulse:  [79] 79  Resp:  [20] 20  SpO2:  [96 %] 96 %  BP: (89)/(49) 89/49        There is no height or weight on file to calculate BMI.  There is no height or weight on file to calculate BSA.    No intake/output data recorded.    Physical Exam   Constitutional: He is oriented to person, place, and time. He appears well-developed. No distress.   Some muscle wasting, signs of dehydration, malnutrition   HENT:   Head: Normocephalic.   Eyes: EOM are normal. Pupils are equal, round, and reactive to light.   Neck: Normal range of motion. Neck supple. No JVD present. No thyromegaly present.   Cardiovascular: Normal rate, regular rhythm, S1 normal, S2 normal, normal heart sounds and intact distal pulses. PMI is not displaced. Exam reveals no gallop and no friction rub.   No murmur heard.  Pulmonary/Chest: Effort normal and breath sounds normal. No respiratory distress. He has no wheezes. He has no rales. He exhibits no tenderness.   Abdominal: He exhibits no distension and no mass. There is no hepatosplenomegaly. There is no tenderness. There is no rebound and no CVA tenderness. No hernia.    ileostomy in place, wound VAC on the left lower quadrant--see details by the exam outlined by    Musculoskeletal: Normal range of motion. He exhibits no edema or tenderness.   Lymphadenopathy:     He has no cervical adenopathy.   Neurological: He is alert and oriented to person, place, and time. He has normal reflexes. He is not disoriented. He displays normal reflexes. No cranial nerve deficit. He  exhibits normal muscle tone. Coordination normal.   Skin: Skin is warm and dry. Capillary refill takes less than 2 seconds. No rash noted. No erythema.   Psychiatric: He has a normal mood and affect. His behavior is normal. Judgment and thought content normal.   Nursing note and vitals reviewed.      Significant Labs:  All labs within the past 24 hours have been reviewed.    Significant Imaging:  Labs: Reviewed

## 2018-11-15 NOTE — ASSESSMENT & PLAN NOTE
Patient has history of congestive heart failure managed on Bumex and Zaroxolyn.  At this point patient has weight loss of 7 lb in the last 1 week.  Patient was discharged home at about 204 lb and now today he weighed 197 lb.  acute rise in creatinine consistent with prerenal etiology.    For now we will withhold diuretics.  Check urine examination for fraction excretion of sodium.  Check urine sodium, check procalcitonin considering the fact the patient has purulent drainage in his wound VAC.  We will consider gentle hydration.  Check chest x-ray.    Will also check renal ultrasound in the morning.    This consultation is performed using telemedicine protocol.

## 2018-11-15 NOTE — PATIENT INSTRUCTIONS
Please as the home health nurse to flush the drain with 10 mL of saline daily.    If we need to further investigate what's going on in her pelvis.  My concern is that you have an infection/leak from where we put you're:  Together.  That is the purpose of the CT scan with the contrast through you're backside.  The reason for the contrast through your anal area or backside is that the oral contrast would not reach the area due to the ileostomy.    Once I get the results of the CT scan and the labs I will let you know where we go from there.    It is possible that you may need a drain placed in the pelvis if the drain that is there is not draining the area of infection.  It is also possible that you would need to be admitted to the hospital.  In the worse case we need to reoperate on you and create another colostomy

## 2018-11-15 NOTE — CONSULTS
[facility] -   Nephrology  Consult Note  Nephrology Telemedicine Consult Note    Consultation started: 11/15/2018 at 5:55 PM   The chief complaint leading to consultation is: BROOKE   This consultation was requested by: Dr. Lindsay   The patient location is: [address]  The patient arrived at: [facility]  Spoke nurse at bedside with patient assisting consultant. Also present with the patient at the time of the consultation:spouse, family member, colleague/friend and Hospital Staff     The attending portion of this evaluation, treatment, and documentation was performed per Sandoval Damico MD via audiovisual.         Patient Name: Yan Choudhury  MRN: [id number]  Admission Date: 11/15/2018  Hospital Length of Stay: 0 days  Attending Provider: Kevin Lindsay MD   Primary Care Physician: Sandra Estevez MD  Principal Problem:<principal problem not specified>    Consults  Subjective:     HPI: Patient is a 63-year-old male with history of chronic kidney disease followed by our division in the outpatient clinic.  Patient has been seen by Dr. Seth and Dr. Juan Luis Galeas in the past.  Patient has had history of diverticular perforation requiring colostomy.  Patient is status post colostomy reversal as well as repair of paraostomy hernia repair.  Patient was recently hospitalized with dehydration.  Patient also has history of congestive heart failure followed by Dr. Calvillo as an outpatient.  Patient takes metolazone as an outpatient for diuretics.  Patient was seen by Dr. Lindsay as an outpatient today and was admitted for dehydration.  Patient's baseline creatinine ranged between 1.8 and 2.1.  Patient never had any proteinuria.  His creatinine has gone up to 8.2 with a BUN elevation as well as hyponatremia.  Nephrology consultation is requested for acute kidney injury on chronic kidney disease stage 3 4/stage IV.    Past Medical History:   Diagnosis Date    Arthritis     CAD (coronary artery disease)      Cataract     CHF (congestive heart failure)     Dr Calvillo    Chronic combined systolic and diastolic congestive heart failure 3/24/2015    CKD (chronic kidney disease), stage III     Diabetes mellitus type II      AM    Diabetic retinopathy     anti Veg F injections for macular edema    Diverticulitis of colon     ED (erectile dysfunction)     Glaucoma     HTN (hypertension)     Hyperlipidemia     Ischemic cardiomyopathy     Obesity     JAHAIRA on CPAP     Pacemaker     Pulmonary HTN        Past Surgical History:   Procedure Laterality Date    CARDIAC CATHETERIZATION  2009    CHOLECYSTECTOMY      COLECTOMY-SIGMOID N/A 4/22/2016    Performed by [facility] at [facility] OR    COLONOSCOPY N/A 10/11/2018    Procedure: COLONOSCOPY;  Surgeon: Quinn Aragon MD;  Location: [facility] ENDO;  Service: General;  Laterality: N/A;    COLONOSCOPY N/A 10/11/2018    Performed by Quinn Aragon MD at [facility] ENDO    COLOSTOMY N/A 4/22/2016    [facility]formed by [facility] at [facility] OR    CREATION, ILEOSTOMY N/A 10/30/2018    Performed by Kevin [facility] at [facility] OR    EYE SURGERY      HEMICOLECTOMY, RIGHT Right 10/30/2018    Performed by Kevin [facility] at [facility] OR    ILEOSTOMY N/A 10/30/2018    Procedure: CREATION, ILEOSTOMY;  Surgeon: Kevin Lindsay MD;  Location: [facility] OR;  Service: General;  Laterality: N/A;    KNEE SURGERY      MOBILIZATION OF SPLENIC FLEXURE N/A 10/30/2018    Procedure: MOBILIZATION, SPLENIC FLEXURE;  Surgeon: Kevin Lindsay MD;  Location: [facility] OR;  Service: General;  Laterality: N/A;    MOBILIZATION, SPLENIC FLEXURE N/A 10/30/2018    Performed by Kevin [facility] at [facility] OR    REVISION COLOSTOMY N/A 10/30/2018    Procedure: REVISION OR CLOSURE, COLOSTOMY;  Surgeon: Kevin Lindsay MD;  Location: [facility] OR;  Service: General;  Laterality: N/A;  Parastomal Hernia Repair    REVISION OR CLOSURE, COLOSTOMY N/A 10/30/2018    [facility]    RIGHT HEMICOLECTOMY Right 10/30/2018    Procedure: HEMICOLECTOMY, RIGHT;  Surgeon: Kevin Lindsay MD;  Location: [facility] OR;  Service: General;  Laterality: Right;       Review of patient's allergies indicates:  No Known Allergies  No current facility-administered medications for this encounter.      Family History     Problem Relation (Age of Onset)    Cancer Mother    Heart disease Father    No Known Problems Sister, Brother, Maternal Aunt, Maternal Uncle, Paternal Aunt, Paternal Uncle, Maternal Grandmother, Maternal Grandfather, Paternal Grandmother, Paternal Grandfather    Stroke Father        Tobacco Use    Smoking status: Never Smoker    Smokeless tobacco: Never Used   Substance and Sexual Activity    Alcohol use: Yes     Alcohol/week: 0.0 oz     Comment: NO ALCOHOL 72 HOURS PRIOR TO SX    Drug use: No    Sexual activity: Not Currently     Review of Systems   Constitutional: Positive for activity change, appetite change, fatigue and unexpected weight change. Negative for chills, diaphoresis and fever.        Patient feels some tiredness and with the had some purulent material coming out of the drain about 2 days ago.  No fevers and chills.  I ileostomy output is formed.   HENT: Negative.  Negative for congestion and trouble swallowing.    Eyes: Negative.    Respiratory: Negative.  Negative for cough, chest tightness, shortness of breath and wheezing.    Cardiovascular: Negative.  Negative for chest pain, palpitations and leg swelling.   Gastrointestinal: Negative.  Negative for abdominal distention, abdominal pain, nausea and vomiting.   Genitourinary: Negative.  Negative for decreased urine volume, difficulty urinating, dysuria, enuresis, flank pain, frequency, hematuria, penile swelling, scrotal swelling and urgency.   Musculoskeletal: Negative.  Negative for arthralgias, back pain, joint swelling and neck pain.   Skin: Negative for rash.   Neurological: Negative.  Negative for tremors,  seizures and headaches.   Psychiatric/Behavioral: Negative.  Negative for confusion and sleep disturbance. The patient is not nervous/anxious.    All other systems reviewed and are negative.    Objective:     Vital Signs (Most Recent):    Vital Signs (24h Range):  Temp:  [97.5 °F (36.4 °C)-98 °F (36.7 °C)] 97.5 °F (36.4 °C)  Pulse:  [79] 79  Resp:  [20] 20  SpO2:  [96 %] 96 %  BP: (89)/(49) 89/49        There is no height or weight on file to calculate BMI.  There is no height or weight on file to calculate BSA.    No intake/output data recorded.    Physical Exam   Constitutional: He is oriented to person, place, and time. He appears well-developed. No distress.   Some muscle wasting, signs of dehydration, malnutrition   HENT:   Head: Normocephalic.   Eyes: EOM are normal. Pupils are equal, round, and reactive to light.   Neck: Normal range of motion. Neck supple. No JVD present. No thyromegaly present.   Cardiovascular: Normal rate, regular rhythm, S1 normal, S2 normal, normal heart sounds and intact distal pulses. PMI is not displaced. Exam reveals no gallop and no friction rub.   No murmur heard.  Pulmonary/Chest: Effort normal and breath sounds normal. No respiratory distress. He has no wheezes. He has no rales. He exhibits no tenderness.   Abdominal: He exhibits no distension and no mass. There is no hepatosplenomegaly. There is no tenderness. There is no rebound and no CVA tenderness. No hernia.    ileostomy in place, wound VAC on the left lower quadrant--see details by the exam outlined by    Musculoskeletal: Normal range of motion. He exhibits no edema or tenderness.   Lymphadenopathy:     He has no cervical adenopathy.   Neurological: He is alert and oriented to person, place, and time. He has normal reflexes. He is not disoriented. He displays normal reflexes. No cranial nerve deficit. He exhibits normal muscle tone. Coordination normal.   Skin: Skin is warm and dry. Capillary refill takes less  than 2 seconds. No rash noted. No erythema.   Psychiatric: He has a normal mood and affect. His behavior is normal. Judgment and thought content normal.   Nursing note and vitals reviewed.      Significant Labs:  All labs within the past 24 hours have been reviewed.    Significant Imaging:  Labs: Reviewed    Assessment/Plan:     Acute on chronic renal failure    Patient has history of congestive heart failure managed on Bumex and Zaroxolyn.  At this point patient has weight loss of 7 lb in the last 1 week.  Patient was discharged home at about 204 lb and now today he weighed 197 lb.  acute rise in creatinine consistent with prerenal etiology.    For now we will withhold diuretics.  Check urine examination for fraction excretion of sodium.  Check urine sodium, check procalcitonin considering the fact the patient has purulent drainage in his wound VAC.  We will consider gentle hydration.  Check chest x-ray.    Will also check renal ultrasound in the morning.    This consultation is performed using telemedicine protocol.         Thank you for your consult.     Sandoval Damico MD   Nephrology  [facility]

## 2018-11-15 NOTE — TELEPHONE ENCOUNTER
Called patient Re: lab test results, patient's wife answered and was advised that the patient needs to go to the ER per Dr. Lindsay due to Kidney Failure, patient's wife stated that she will get him to the hospital ASA, she voiced understanding.

## 2018-11-15 NOTE — HPI
Patient is a 63-year-old male with history of chronic kidney disease followed by our division in the outpatient clinic.  Patient has been seen by Dr. Seth and Dr. Juan Luis Galeas in the past.  Patient has had history of diverticular perforation requiring colostomy.  Patient is status post colostomy reversal as well as repair of paraostomy hernia repair.  Patient was recently hospitalized with dehydration.  Patient also has history of congestive heart failure followed by Dr. Calvillo as an outpatient.  Patient takes metolazone as an outpatient for diuretics.  Patient was seen by Dr. Lindsay as an outpatient today and was admitted for dehydration.  Patient's baseline creatinine ranged between 1.8 and 2.1.  Patient never had any proteinuria.  His creatinine has gone up to 8.2 with a BUN elevation as well as hyponatremia.  Nephrology consultation is requested for acute kidney injury on chronic kidney disease stage 3 4/stage IV.

## 2018-11-15 NOTE — PROGRESS NOTES
History & Physical    SUBJECTIVE:     History of Present Illness:  Patient is a 63 y.o. male presents after a colostomy reversal, repair of parastomal hernia and protective ileostomy.    The patient states he is doing okay but he is a bit tired and fatigued he states that purulent material started coming out of his drain about 2 days ago.  He denies any fever or chills.  He states that his ileostomy output is more formed.          Chief Complaint   Patient presents with    Post-op Evaluation       Review of patient's allergies indicates:  No Known Allergies    Current Outpatient Medications   Medication Sig Dispense Refill    allopurinol (ZYLOPRIM) 100 MG tablet TAKE 1 TABLET (100 MG TOTAL) BY MOUTH ONCE DAILY. (Patient taking differently: TAKE 1 TABLET (100 MG TOTAL) BY MOUTH ONCE DAILY./ takes in AM) 90 tablet 0    aspirin (ECOTRIN) 81 MG EC tablet Take 81 mg by mouth every morning.       BD INSULIN SYRINGE ULTRA-FINE 0.5 mL 31 gauge x 5/16 Syrg USE AS DIRECTED TO INJECT INSULIN TWO TIMES DAILY 60 each 11    bimatoprost (LUMIGAN) 0.03 % ophthalmic drops Place 1 drop into the left eye every evening. (Patient taking differently: Place 1 drop into the right eye every evening. ) 2.5 mL 11    blood sugar diagnostic Strp 1 strip by Misc.(Non-Drug; Combo Route) route 4 (four) times daily. Telcare 120 strip 11    bumetanide (BUMEX) 2 MG tablet Take 1 tablet (2 mg total) by mouth 2 (two) times daily. (Patient taking differently: Take 2 mg by mouth every morning. ) 75 tablet 11    carvedilol (COREG) 25 MG tablet TAKE 1/2 TABLET BY MOUTH TWICE A DAY WITH MEALS 30 tablet 9    cholecalciferol, vitamin D3, (VITAMIN D3) 1,000 unit capsule Take 1,000 Units by mouth once daily.      diphenoxylate-atropine 2.5-0.025 mg (LOMOTIL) 2.5-0.025 mg per tablet Take 1 tablet by mouth 4 (four) times daily. for 10 days 120 tablet 2    HYDROcodone-acetaminophen (NORCO) 5-325 mg per tablet One or 2 every 4-6 hours as needed for pain  "20 tablet 0    insulin glargine (LANTUS) 100 unit/mL injection 50 units bid prn when BS< 150 30 mL 11    IRON/VITAMIN B COMPLEX (GERITOL ORAL) Take by mouth.      lancets [facility] For tel care 120 each 11    lisinopril (PRINIVIL,ZESTRIL) 5 MG tablet TAKE 1 TABLET (5 MG TOTAL) BY MOUTH ONCE DAILY. (Patient taking differently: Take 5 mg by mouth every morning. ) 90 tablet 3    metOLazone (ZAROXOLYN) 2.5 MG tablet Take 1 tablet (2.5 mg total) by mouth every Mon, Wed, Fri. 12 tablet 11    pantoprazole (PROTONIX) 40 MG tablet TAKE 1 TABLET BY MOUTH EVERY DAY FOR ACID REFLUX 90 tablet 1    pen needle, diabetic (BD ULTRA-FINE JOSLYN PEN NEEDLE) 32 gauge x 5/32" Ndle 1 each by Misc.(Non-Drug; Combo Route) route 4 (four) times daily. 100 each 11    potassium chloride SA (K-DUR,KLOR-CON) 10 MEQ tablet TAKE 1 TABLET BY MOUTH EVERY DAY (Patient taking differently: TAKE 1 TABLET BY MOUTH EVERY NIGHT) 90 tablet 2    psyllium husk, aspartame, (METAMUCIL) 3.4 gram PwPk packet Take 1 packet by mouth once daily.      simvastatin (ZOCOR) 10 MG tablet TAKE 1 TABLET (10 MG TOTAL) BY MOUTH EVERY EVENING. 30 tablet 7    ciprofloxacin HCl (CIPRO) 500 MG tablet Take 1 tablet (500 mg total) by mouth every 12 (twelve) hours. for 14 days 28 tablet 0    metroNIDAZOLE (FLAGYL) 500 MG tablet Take 1 tablet (500 mg total) by mouth every 12 (twelve) hours. The night before surgery for 14 doses 32 tablet 0     No current facility-administered medications for this visit.        Past Medical History:   Diagnosis Date    Arthritis     CAD (coronary artery disease)     Cataract     CHF (congestive heart failure)     Dr Calvillo    Chronic combined systolic and diastolic congestive heart failure 3/24/2015    CKD (chronic kidney disease), stage III     Diabetes mellitus type II      AM    Diabetic retinopathy     anti Veg F injections for macular edema    Diverticulitis of colon     ED (erectile dysfunction)     Glaucoma     HTN " (hypertension)     Hyperlipidemia     Ischemic cardiomyopathy     Obesity     JAHAIRA on CPAP     Pacemaker     Pulmonary HTN      Past Surgical History:   Procedure Laterality Date    CARDIAC CATHETERIZATION  2009    CHOLECYSTECTOMY      COLECTOMY-SIGMOID N/A 4/22/2016    [facility] Kevin [facility] OR    COLONOSCOPY N/A 10/11/2018    Procedure: COLONOSCOPY;  Surgeon: Quinn Aragon MD;  Location: [facility] ENDO;  Service: General;  Laterality: N/A;    COLONOSCOPY N/A 10/11/2018    [facility]formed by Quinn Aragon MD at [facility] ENDO    COLOSTOMY N/A 4/22/2016    [facility] by Kevin Lindsay [facility] at [facility] OR    CREATION, ILEOSTOMY N/A 10/30/2018    [facility] by Kevin [facility] at [facility] OR    EYE SURGERY      HEMICOLECTOMY, RIGHT Right 10/30/2018    [facility] by Kevin [facility] at [facility]    ILEOSTOMY N/A 10/30/2018    Procedure: CREATION, ILEOSTOMY;  Surgeon: Kevin Lindsay MD;  Location: [facility] OR;  Service: General;  Laterality: N/A;    KNEE SURGERY      MOBILIZATION OF SPLENIC FLEXURE N/A 10/30/2018    Procedure: MOBILIZATION, SPLENIC FLEXURE;  Surgeon: Kevin Lindsay MD;  Location: [facility] OR;  Service: General;  Laterality: N/A;    MOBILIZATION, SPLENIC FLEXURE N/A 10/30/2018    Performed by Kevin Lindsay MD at [facility] OR    REVISION COLOSTOMY N/A 10/30/2018    Procedure: REVISION OR CLOSURE, COLOSTOMY;  Surgeon: Kevin Lindsay MD;  Location: [facility] OR;  Service: General;  Laterality: N/A;  Parastomal Hernia Repair    REVISION OR CLOSURE, COLOSTOMY N/A 10/30/2018    Performed by Kevin Lindsay MD at [facility] OR    RIGHT HEMICOLECTOMY Right 10/30/2018    Procedure: HEMICOLECTOMY, RIGHT;  Surgeon: Kevin Lindsay MD;  Location: [facility] OR;  Service: General;  Laterality: Right;     Family History   Problem Relation Age of Onset    Cancer Mother     Heart disease Father     Stroke Father     No Known Problems Sister     No Known Problems Brother     No Known Problems  Maternal Aunt     No Known Problems Maternal Uncle     No Known Problems Paternal Aunt     No Known Problems Paternal Uncle     No Known Problems Maternal Grandmother     No Known Problems Maternal Grandfather     No Known Problems Paternal Grandmother     No Known Problems Paternal Grandfather     Amblyopia Neg Hx     Blindness Neg Hx     Cataracts Neg Hx     Diabetes Neg Hx     Glaucoma Neg Hx     Hypertension Neg Hx     Macular degeneration Neg Hx     Retinal detachment Neg Hx     Strabismus Neg Hx     Thyroid disease Neg Hx      Social History     Tobacco Use    Smoking status: Never Smoker    Smokeless tobacco: Never Used   Substance Use Topics    Alcohol use: Yes     Alcohol/week: 0.0 oz     Comment: NO ALCOHOL 72 HOURS PRIOR TO SX    Drug use: No        Review of Systems:  Review of Systems   Constitutional: Positive for fatigue. Negative for fever.   HENT: Negative.    Eyes: Negative.    Respiratory: Negative.    Cardiovascular: Negative.    Gastrointestinal: Negative.    Endocrine: Negative.    Genitourinary: Negative.    Musculoskeletal: Negative.    Skin:        Incision is not draining.  Ostomy site is clean.  Purulent drainage from his KINDRA drain   Allergic/Immunologic: Negative.    Neurological: Negative.    Hematological: Negative.    Psychiatric/Behavioral: Negative.        OBJECTIVE:     Vital Signs (Most Recent)  Temp: 98 °F (36.7 °C) (11/15/18 1112)     92.5 kg (204 lb)     Physical Exam:  Physical Exam   Constitutional: No distress.   Chronically ill   HENT:   Head: Normocephalic and atraumatic.   Neck: Neck supple.   Cardiovascular: Normal rate and regular rhythm.   Pulmonary/Chest: Effort normal.   Abdominal: Soft. Bowel sounds are normal.   There is a healthy ileostomy in the right upper quadrant with 2 openings.  There is a midline incision that is healing well.  There is a left lower abdomen ostomy site with a wound VAC.    The staples were removed from the incision.  The  supporting bar was removed from the ileostomy.  The wound VAC was changed and replaced.  There is good granulation tissue.    The drain had purulent material it flushed well and cleared [place]   Vitals reviewed.      Laboratory  CBC: Reviewed  BMP: Reviewed    White blood cell count was elevated at 13.    His BUN and creatinine are elevated at 70 an 8.2      Diagnostic Results:  CT scan of the abdomen and pelvis with rectal contrast to evaluate for abscess last anastomotic leak    ASSESSMENT/PLAN:     One.  Acute on chronic renal failure  2.  Suspect pelvic abscess that may be incompletely drained  3.  Multiple medical problems as outlined above    PLAN:Plan     We have contacted the patient and asked him to present to the hospital for admission.  He will need IV fluids.  We will need to arrange a CT scan of his pelvis with rectal contrast to further evaluate the cause of rectal drainage in to assess his anastomosis.  He will need to be seen by Medicine and Nephrology

## 2018-11-16 PROBLEM — K91.89 GASTROINTESTINAL ANASTOMOTIC LEAK: Status: ACTIVE | Noted: 2018-11-16

## 2018-11-16 LAB
ALBUMIN SERPL BCP-MCNC: 2.6 G/DL
ALBUMIN SERPL BCP-MCNC: 2.6 G/DL
ALP SERPL-CCNC: 83 U/L
ALT SERPL W/O P-5'-P-CCNC: 51 U/L
ANION GAP SERPL CALC-SCNC: 14 MMOL/L
ANION GAP SERPL CALC-SCNC: 14 MMOL/L
AST SERPL-CCNC: 50 U/L
BASOPHILS # BLD AUTO: 0.02 K/UL
BASOPHILS NFR BLD: 0.1 %
BILIRUB SERPL-MCNC: 0.9 MG/DL
BILIRUB UR QL STRIP: ABNORMAL
BUN SERPL-MCNC: 72 MG/DL
BUN SERPL-MCNC: 72 MG/DL
CALCIUM SERPL-MCNC: 9.1 MG/DL
CALCIUM SERPL-MCNC: 9.1 MG/DL
CHLORIDE SERPL-SCNC: 100 MMOL/L
CHLORIDE SERPL-SCNC: 100 MMOL/L
CHOLEST SERPL-MCNC: 91 MG/DL
CHOLEST/HDLC SERPL: 4.3 {RATIO}
CLARITY UR: CLEAR
CO2 SERPL-SCNC: 18 MMOL/L
CO2 SERPL-SCNC: 18 MMOL/L
COLOR UR: YELLOW
CREAT SERPL-MCNC: 8 MG/DL
CREAT SERPL-MCNC: 8 MG/DL
CREAT UR-MCNC: >450 MG/DL
CREAT UR-MCNC: >450 MG/DL
DIFFERENTIAL METHOD: ABNORMAL
EOSINOPHIL # BLD AUTO: 0.1 K/UL
EOSINOPHIL NFR BLD: 0.8 %
ERYTHROCYTE [DISTWIDTH] IN BLOOD BY AUTOMATED COUNT: 15.4 %
EST. GFR  (AFRICAN AMERICAN): 7 ML/MIN/1.73 M^2
EST. GFR  (AFRICAN AMERICAN): 7 ML/MIN/1.73 M^2
EST. GFR  (NON AFRICAN AMERICAN): 6 ML/MIN/1.73 M^2
EST. GFR  (NON AFRICAN AMERICAN): 6 ML/MIN/1.73 M^2
ESTIMATED AVG GLUCOSE: 123 MG/DL
GLUCOSE SERPL-MCNC: 144 MG/DL
GLUCOSE SERPL-MCNC: 144 MG/DL
GLUCOSE UR QL STRIP: NEGATIVE
HBA1C MFR BLD HPLC: 5.9 %
HCT VFR BLD AUTO: 27.4 %
HDLC SERPL-MCNC: 21 MG/DL
HDLC SERPL: 23.1 %
HGB BLD-MCNC: 9.2 G/DL
HGB UR QL STRIP: ABNORMAL
INR PPP: 1.1
KETONES UR QL STRIP: ABNORMAL
LDLC SERPL CALC-MCNC: 46.2 MG/DL
LEUKOCYTE ESTERASE UR QL STRIP: NEGATIVE
LYMPHOCYTES # BLD AUTO: 1.2 K/UL
LYMPHOCYTES NFR BLD: 8.4 %
MCH RBC QN AUTO: 27.5 PG
MCHC RBC AUTO-ENTMCNC: 33.6 G/DL
MCV RBC AUTO: 82 FL
MONOCYTES # BLD AUTO: 1.2 K/UL
MONOCYTES NFR BLD: 8.6 %
NEUTROPHILS # BLD AUTO: 11.6 K/UL
NEUTROPHILS NFR BLD: 82.1 %
NITRITE UR QL STRIP: NEGATIVE
NONHDLC SERPL-MCNC: 70 MG/DL
PH UR STRIP: 5 [PH] (ref 5–8)
PHOSPHATE SERPL-MCNC: 6.6 MG/DL
PHOSPHATE SERPL-MCNC: 6.6 MG/DL
PLATELET # BLD AUTO: 365 K/UL
PMV BLD AUTO: 10.2 FL
POCT GLUCOSE: 126 MG/DL (ref 70–110)
POCT GLUCOSE: 141 MG/DL (ref 70–110)
POTASSIUM SERPL-SCNC: 4.1 MMOL/L
POTASSIUM SERPL-SCNC: 4.1 MMOL/L
PROT SERPL-MCNC: 7.2 G/DL
PROT UR QL STRIP: NEGATIVE
PROT UR-MCNC: 38 MG/DL
PROT/CREAT UR: ABNORMAL MG/G{CREAT}
PROTHROMBIN TIME: 12 SEC
RBC # BLD AUTO: 3.35 M/UL
SODIUM SERPL-SCNC: 132 MMOL/L
SODIUM SERPL-SCNC: 132 MMOL/L
SODIUM UR-SCNC: <20 MMOL/L
SP GR UR STRIP: >=1.03 (ref 1–1.03)
TRIGL SERPL-MCNC: 119 MG/DL
TSH SERPL DL<=0.005 MIU/L-ACNC: 1.51 UIU/ML
URN SPEC COLLECT METH UR: ABNORMAL
UROBILINOGEN UR STRIP-ACNC: NEGATIVE EU/DL
WBC # BLD AUTO: 14.12 K/UL

## 2018-11-16 PROCEDURE — 83036 HEMOGLOBIN GLYCOSYLATED A1C: CPT

## 2018-11-16 PROCEDURE — 80069 RENAL FUNCTION PANEL: CPT

## 2018-11-16 PROCEDURE — 80053 COMPREHEN METABOLIC PANEL: CPT

## 2018-11-16 PROCEDURE — 21400001 HC TELEMETRY ROOM

## 2018-11-16 PROCEDURE — 85610 PROTHROMBIN TIME: CPT

## 2018-11-16 PROCEDURE — 63600175 PHARM REV CODE 636 W HCPCS: Performed by: NURSE PRACTITIONER

## 2018-11-16 PROCEDURE — 25000003 PHARM REV CODE 250: Performed by: SURGERY

## 2018-11-16 PROCEDURE — 25000003 PHARM REV CODE 250: Performed by: NURSE PRACTITIONER

## 2018-11-16 PROCEDURE — 36415 COLL VENOUS BLD VENIPUNCTURE: CPT

## 2018-11-16 PROCEDURE — S0030 INJECTION, METRONIDAZOLE: HCPCS | Performed by: SURGERY

## 2018-11-16 PROCEDURE — 80061 LIPID PANEL: CPT

## 2018-11-16 PROCEDURE — 85025 COMPLETE CBC W/AUTO DIFF WBC: CPT

## 2018-11-16 PROCEDURE — 99233 SBSQ HOSP IP/OBS HIGH 50: CPT | Mod: ,,, | Performed by: INTERNAL MEDICINE

## 2018-11-16 PROCEDURE — 99223 1ST HOSP IP/OBS HIGH 75: CPT | Mod: ,,, | Performed by: INTERNAL MEDICINE

## 2018-11-16 PROCEDURE — 84443 ASSAY THYROID STIM HORMONE: CPT

## 2018-11-16 RX ORDER — ACETAMINOPHEN 325 MG/1
650 TABLET ORAL EVERY 8 HOURS PRN
Status: DISCONTINUED | OUTPATIENT
Start: 2018-11-16 | End: 2018-11-21 | Stop reason: HOSPADM

## 2018-11-16 RX ADMIN — SIMVASTATIN 10 MG: 5 TABLET, FILM COATED ORAL at 08:11

## 2018-11-16 RX ADMIN — CIPROFLOXACIN 400 MG: 2 INJECTION, SOLUTION INTRAVENOUS at 11:11

## 2018-11-16 RX ADMIN — METRONIDAZOLE 500 MG: 500 INJECTION, SOLUTION INTRAVENOUS at 09:11

## 2018-11-16 RX ADMIN — ALLOPURINOL 100 MG: 100 TABLET ORAL at 09:11

## 2018-11-16 RX ADMIN — PANTOPRAZOLE SODIUM 40 MG: 40 TABLET, DELAYED RELEASE ORAL at 09:11

## 2018-11-16 RX ADMIN — SODIUM CHLORIDE: 0.9 INJECTION, SOLUTION INTRAVENOUS at 05:11

## 2018-11-16 RX ADMIN — METRONIDAZOLE 500 MG: 500 INJECTION, SOLUTION INTRAVENOUS at 04:11

## 2018-11-16 RX ADMIN — LISINOPRIL 5 MG: 5 TABLET ORAL at 09:11

## 2018-11-16 RX ADMIN — METRONIDAZOLE 500 MG: 500 INJECTION, SOLUTION INTRAVENOUS at 02:11

## 2018-11-16 RX ADMIN — CARVEDILOL 25 MG: 12.5 TABLET, FILM COATED ORAL at 09:11

## 2018-11-16 RX ADMIN — PSYLLIUM HUSK 6 G: 6 GRANULE ORAL at 09:11

## 2018-11-16 RX ADMIN — ACETAMINOPHEN 650 MG: 325 TABLET, FILM COATED ORAL at 08:11

## 2018-11-16 NOTE — H&P
History & Physical     SUBJECTIVE:      History of Present Illness:  Patient is a 63 y.o. male presents after a colostomy reversal, repair of parastomal hernia and protective ileostomy.     The patient states he is doing okay but he is a bit tired and fatigued he states that purulent material started coming out of his drain about 2 days ago.  He denies any fever or chills.  He states that his ileostomy output is more formed.                  Chief Complaint   Patient presents with    Post-op Evaluation         Review of patient's allergies indicates:  No Known Allergies     Current Medications          Current Outpatient Medications   Medication Sig Dispense Refill    allopurinol (ZYLOPRIM) 100 MG tablet TAKE 1 TABLET (100 MG TOTAL) BY MOUTH ONCE DAILY. (Patient taking differently: TAKE 1 TABLET (100 MG TOTAL) BY MOUTH ONCE DAILY./ takes in AM) 90 tablet 0    aspirin (ECOTRIN) 81 MG EC tablet Take 81 mg by mouth every morning.         BD INSULIN SYRINGE ULTRA-FINE 0.5 mL 31 gauge x 5/16 Syrg USE AS DIRECTED TO INJECT INSULIN TWO TIMES DAILY 60 each 11    bimatoprost (LUMIGAN) 0.03 % ophthalmic drops Place 1 drop into the left eye every evening. (Patient taking differently: Place 1 drop into the right eye every evening. ) 2.5 mL 11    blood sugar diagnostic Strp 1 strip by Misc.(Non-Drug; Combo Route) route 4 (four) times daily. Telcare 120 strip 11    bumetanide (BUMEX) 2 MG tablet Take 1 tablet (2 mg total) by mouth 2 (two) times daily. (Patient taking differently: Take 2 mg by mouth every morning. ) 75 tablet 11    carvedilol (COREG) 25 MG tablet TAKE 1/2 TABLET BY MOUTH TWICE A DAY WITH MEALS 30 tablet 9    cholecalciferol, vitamin D3, (VITAMIN D3) 1,000 unit capsule Take 1,000 Units by mouth once daily.        diphenoxylate-atropine 2.5-0.025 mg (LOMOTIL) 2.5-0.025 mg per tablet Take 1 tablet by mouth 4 (four) times daily. for 10 days 120 tablet 2    HYDROcodone-acetaminophen (NORCO) 5-325 mg per  "tablet One or 2 every 4-6 hours as needed for pain 20 tablet 0    insulin glargine (LANTUS) 100 unit/mL injection 50 units bid prn when BS< 150 30 mL 11    IRON/VITAMIN B COMPLEX (GERITOL ORAL) Take by mouth.        lancets [facility] For tel care 120 each 11    lisinopril (PRINIVIL,ZESTRIL) 5 MG tablet TAKE 1 TABLET (5 MG TOTAL) BY MOUTH ONCE DAILY. (Patient taking differently: Take 5 mg by mouth every morning. ) 90 tablet 3    metOLazone (ZAROXOLYN) 2.5 MG tablet Take 1 tablet (2.5 mg total) by mouth every Mon, Wed, Fri. 12 tablet 11    pantoprazole (PROTONIX) 40 MG tablet TAKE 1 TABLET BY MOUTH EVERY DAY FOR ACID REFLUX 90 tablet 1    pen needle, diabetic (BD ULTRA-FINE JOSLYN PEN NEEDLE) 32 gauge x 5/32" Ndle 1 each by Misc.(Non-Drug; Combo Route) route 4 (four) times daily. 100 each 11    potassium chloride SA (K-DUR,KLOR-CON) 10 MEQ tablet TAKE 1 TABLET BY MOUTH EVERY DAY (Patient taking differently: TAKE 1 TABLET BY MOUTH EVERY NIGHT) 90 tablet 2    psyllium husk, aspartame, (METAMUCIL) 3.4 gram PwPk packet Take 1 packet by mouth once daily.        simvastatin (ZOCOR) 10 MG tablet TAKE 1 TABLET (10 MG TOTAL) BY MOUTH EVERY EVENING. 30 tablet 7    ciprofloxacin HCl (CIPRO) 500 MG tablet Take 1 tablet (500 mg total) by mouth every 12 (twelve) hours. for 14 days 28 tablet 0    metroNIDAZOLE (FLAGYL) 500 MG tablet Take 1 tablet (500 mg total) by mouth every 12 (twelve) hours. The night before surgery for 14 doses 32 tablet 0      No current facility-administered medications for this visit.                  Past Medical History:   Diagnosis Date    Arthritis      CAD (coronary artery disease)      Cataract      CHF (congestive heart failure)       Dr Calvillo    Chronic combined systolic and diastolic congestive heart failure 3/24/2015    CKD (chronic kidney disease), stage III      Diabetes mellitus type II        AM    Diabetic retinopathy       anti Veg F injections for macular edema    " Diverticulitis of colon      ED (erectile dysfunction)      Glaucoma      HTN (hypertension)      Hyperlipidemia      Ischemic cardiomyopathy      Obesity      JAHAIRA on CPAP      Pacemaker      Pulmonary HTN              Past Surgical History:   Procedure Laterality Date    CARDIAC CATHETERIZATION   2009    CHOLECYSTECTOMY        COLECTOMY-SIGMOID N/A 4/22/2016     Performed by Kevin Lindsay MD at [facility] [place]    COLONOSCOPY N/A 10/11/2018     Procedure: COLONOSCOPY;  Surgeon: Quinn Aragon MD;  Location: [facility] ENDO;  Service: General;  Laterality: N/A;    COLONOSCOPY N/A 10/11/2018     Performed by Quinn Aragon MD at [facility] ENDO    COLOSTOMY N/A 4/22/2016     Performed by Kevin Lindsay MD at [facility] [place]    CREATION, ILEOSTOMY N/A 10/30/2018     Performed by Kevin Lindsay MD at [facility] [place]    EYE SURGERY        HEMICOLECTOMY, RIGHT Right 10/30/2018     Performed by Kevin Lindsay MD at [facility] [place]    ILEOSTOMY N/A 10/30/2018     Procedure: CREATION, ILEOSTOMY;  Surgeon: Kevin Lindsay MD;  Location: [facility] OR;  Service: General;  Laterality: N/A;    KNEE SURGERY        MOBILIZATION OF SPLENIC FLEXURE N/A 10/30/2018     Procedure: MOBILIZATION, SPLENIC FLEXURE;  Surgeon: Kevin Lindsay MD;  Location: [facility] OR;  Service: General;  Laterality: N/A;    MOBILIZATION, SPLENIC FLEXURE N/A 10/30/2018     Performed by Kevin Lindsay MD at [facility] [place]    REVISION COLOSTOMY N/A 10/30/2018     Procedure: REVISION OR CLOSURE, COLOSTOMY;  Surgeon: Kevin Lindsay MD;  Location: [facility] [place];  Service: General;  Laterality: N/A;  Parastomal Hernia Repair    REVISION OR CLOSURE, COLOSTOMY N/A 10/30/2018     Performed by Kevin Lindsay MD at [facility] [place]    RIGHT HEMICOLECTOMY Right 10/30/2018     Procedure: HEMICOLECTOMY, RIGHT;  Surgeon: Kevin Lindsay MD;  Location: [facility] [place];  Service: General;  Laterality: Right;            Family History   Problem Relation Age of Onset    Cancer Mother      Heart  disease Father      Stroke Father      No Known Problems Sister      No Known Problems Brother      No Known Problems Maternal Aunt      No Known Problems Maternal Uncle      No Known Problems Paternal Aunt      No Known Problems Paternal Uncle      No Known Problems Maternal Grandmother      No Known Problems Maternal Grandfather      No Known Problems Paternal Grandmother      No Known Problems Paternal Grandfather      Amblyopia Neg Hx      Blindness Neg Hx      Cataracts Neg Hx      Diabetes Neg Hx      Glaucoma Neg Hx      Hypertension Neg Hx      Macular degeneration Neg Hx      Retinal detachment Neg Hx      Strabismus Neg Hx      Thyroid disease Neg Hx        Social History            Tobacco Use    Smoking status: Never Smoker    Smokeless tobacco: Never Used   Substance Use Topics    Alcohol use: Yes       Alcohol/week: 0.0 oz       Comment: NO ALCOHOL 72 HOURS PRIOR TO SX    Drug use: No         Review of Systems:  Review of Systems   Constitutional: Positive for fatigue. Negative for fever.   HENT: Negative.    Eyes: Negative.    Respiratory: Negative.    Cardiovascular: Negative.    Gastrointestinal: Negative.    Endocrine: Negative.    Genitourinary: Negative.    Musculoskeletal: Negative.    Skin:        Incision is not draining.  Ostomy site is clean.  Purulent drainage from his KINDRA drain   Allergic/Immunologic: Negative.    Neurological: Negative.    Hematological: Negative.    Psychiatric/Behavioral: Negative.          OBJECTIVE:      Vital Signs (Most Recent)  Temp: 98 °F (36.7 °C) (11/15/18 1112)  92.5 kg (204 lb)      Physical Exam:  Physical Exam   Constitutional: No distress.   Chronically ill   HENT:   Head: Normocephalic and atraumatic.   Neck: Neck supple.   Cardiovascular: Normal rate and regular rhythm.   Pulmonary/Chest: Effort normal.   Abdominal: Soft. Bowel sounds are normal.   There is a healthy ileostomy in the right upper quadrant with 2 openings.  There is  a midline incision that is healing well.  There is a left lower abdomen ostomy site with a wound VAC.    The staples were removed from the incision.  The supporting bar was removed from the ileostomy.  The wound VAC was changed and replaced.  There is good granulation tissue.    The drain had purulent material it flushed well and cleared [place]   Vitals reviewed.        Laboratory  CBC: Reviewed  BMP: Reviewed     White blood cell count was elevated at 13.     His BUN and creatinine are elevated at 70 an 8.2        Diagnostic Results:  CT scan of the abdomen and pelvis with rectal contrast to evaluate for abscess last anastomotic leak     ASSESSMENT/PLAN:      One.  Acute on chronic renal failure  2.  Suspect pelvic abscess that may be incompletely drained  3.  Multiple medical problems as outlined above     PLAN:Plan      We have contacted the patient and asked him to present to the hospital for admission.  He will need IV fluids.  We will need to arrange a CT scan of his pelvis with rectal contrast to further evaluate the cause of rectal drainage in to assess his anastomosis.  He will need to be seen by Medicine and Nephrology

## 2018-11-16 NOTE — ASSESSMENT & PLAN NOTE
-Patient echo in Oct 2018 shows Moderately depressed left ventricular systolic function (EF 30-35% and RV dysfunction   -Has no evidence of volume overload on exam. Patient appears quite dry on exam and needs IV hydration  -Will see how he responds and if diuresis needed, will address at that. Will start Dobutamine if patient decompensates, but he has significant BROOKE needing IV hydration.   -Continue BB  -Stop ACEI to avoid worsening renal function   -Give Hydralazine if needed for BP control

## 2018-11-16 NOTE — ASSESSMENT & PLAN NOTE
General surgery managing  Blood cultures pending, lactic pending  Continue cipro/flagyl  CT ABD pending.     NO SEPSIS. Patient hypotension influenced by combined CHF and dehydration. Other vitals stable, afebrile.

## 2018-11-16 NOTE — CONSULTS
[facility] -   Cardiology  Consult Note    Patient Name: Yan Choudhury  MRN: [id number]  Admission Date: 11/15/2018  Hospital Length of Stay: 1 days  Code Status: Full Code   Attending Provider: Kevin Lindsay MD   Consulting Provider: MANPREET Vivas  Primary Care Physician: Sandra Estevez MD  Principal Problem:Open wound of anterior abdominal wall with complication    Patient information was obtained from patient and ER records.     Inpatient consult to Cardiology  Consult performed by: MANPREET Vivas  Consult ordered by: Angeli Stevenson PA-C  Reason for consult: BROOKE needing IV hydration in patient with CHF         Subjective:     Chief Complaint:  Fatigue      HPI:   Patient is a 63 y.o. male CAD/ICM, DCM, LBBB, HTN, CRI, DM, CRT-ICD,  PTHN, dyslipidemia, obesity. Nonsmoker.   LHC was done 2009 showed diffuse CAD, and severe distal CAD involving apical LAD, distal RCA/distal LCX.   Echo Oct 2010 showed LVEF 25%.   PET stress 2016 showed inferior scar, no significant ischemia noted and LVEF < 35%. Is s/p BIV ICD 3/16 for CHF/LBBB.  Echo 12/17 EF 20%, DD, mod PHTN and unchanged in Oct 2018.  [place] [facility] [place] direct admit from general surgery office. Is post colostomy reversal, repair of parastomal hernia and protective ileostomy. Had reports of purulent material from the drain and complained of fatigue. Labs checked as an OP and noted to have BROOKE. Baseline creatine around 1.8- 2, but creatine of 8.2 on admit. Patient takes diuretics as an OP and sometimes adjusts dose on his own. Has been treated with abx and IVF. . CXR shows no acute process.         Past Medical History:   Diagnosis Date    Arthritis     CAD (coronary artery disease)     Cataract     CHF (congestive heart failure)     Dr Calvillo    Chronic combined systolic and diastolic congestive heart failure 3/24/2015    CKD (chronic kidney disease), stage III     Diabetes mellitus type II      AM     Diabetic retinopathy     anti Veg F injections for macular edema    Diverticulitis of colon     ED (erectile dysfunction)     Glaucoma     HTN (hypertension)     Hyperlipidemia     Ischemic cardiomyopathy     Obesity     JAHAIRA on CPAP     Pacemaker     Pulmonary HTN        Past Surgical History:   Procedure Laterality Date    CARDIAC CATHETERIZATION  2009    CHOLECYSTECTOMY      COLECTOMY-SIGMOID N/A 4/22/2016    Performed by Kevin Lindsay MD at [facility] OR    COLONOSCOPY N/A 10/11/2018    Procedure: COLONOSCOPY;  Surgeon: Quinn Aragon MD;  Location: [facility] ENDO;  Service: General;  Laterality: N/A;    COLONOSCOPY N/A 10/11/2018    Performed by Quinn Aragon MD at [facility] ENDO    COLOSTOMY N/A 4/22/2016    Performed by Kevin [facility] MD at [facility] OR    CREATION, ILEOSTOMY N/A 10/30/2018    Performed by Kevin Lindsay MD at [facility] OR    EYE SURGERY      HEMICOLECTOMY, RIGHT Right 10/30/2018    Performed by Kevin [facility] MD at [facility] OR    ILEOSTOMY N/A 10/30/2018    Procedure: CREATION, ILEOSTOMY;  Surgeon: Kevin Lindsay MD;  Location: [facility] OR;  Service: General;  Laterality: N/A;    KNEE SURGERY      MOBILIZATION OF SPLENIC FLEXURE N/A 10/30/2018    Procedure: MOBILIZATION, SPLENIC FLEXURE;  Surgeon: Kevin Lindsay MD;  Location: [facility] OR;  Service: General;  Laterality: N/A;    MOBILIZATION, SPLENIC FLEXURE N/A 10/30/2018    Performed by Kevin Lindsay MD at [facility] OR    REVISION COLOSTOMY N/A 10/30/2018    Procedure: REVISION OR CLOSURE, COLOSTOMY;  Surgeon: Kevin Lindsay MD;  Location: [facility] OR;  Service: General;  Laterality: N/A;  Parastomal Hernia Repair    REVISION OR CLOSURE, COLOSTOMY N/A 10/30/2018    Performed by Kevin Lindsay MD at [facility] OR    RIGHT HEMICOLECTOMY Right 10/30/2018    Procedure: HEMICOLECTOMY, RIGHT;  Surgeon: Kevin Lindsay MD;  Location: [facility] OR;  Service: General;  Laterality: Right;       Review of patient's allergies indicates:  No  Known Allergies    No current facility-administered medications on file prior to encounter.      Current Outpatient Medications on File Prior to Encounter   Medication Sig    allopurinol (ZYLOPRIM) 100 MG tablet TAKE 1 TABLET (100 MG TOTAL) BY MOUTH ONCE DAILY. (Patient taking differently: TAKE 1 TABLET (100 MG TOTAL) BY MOUTH ONCE DAILY./ takes in AM)    aspirin (ECOTRIN) 81 MG EC tablet Take 81 mg by mouth every morning.     BD INSULIN SYRINGE ULTRA-FINE 0.5 mL 31 gauge x 5/16 Syrg USE AS DIRECTED TO INJECT INSULIN TWO TIMES DAILY    bimatoprost (LUMIGAN) 0.03 % ophthalmic drops Place 1 drop into the left eye every evening. (Patient taking differently: Place 1 drop into the right eye every evening. )    blood sugar diagnostic Strp 1 strip by Misc.(Non-Drug; Combo Route) route 4 (four) times daily. [employer]    bumetanide (BUMEX) 2 MG tablet Take 1 tablet (2 mg total) by mouth 2 (two) times daily. (Patient taking differently: Take 2 mg by mouth every morning. )    carvedilol (COREG) 25 MG tablet TAKE 1/2 TABLET BY MOUTH TWICE A DAY WITH MEALS    cholecalciferol, vitamin D3, (VITAMIN D3) 1,000 unit capsule Take 1,000 Units by mouth once daily.    ciprofloxacin HCl (CIPRO) 500 MG tablet Take 1 tablet (500 mg total) by mouth every 12 (twelve) hours. for 14 days    diphenoxylate-atropine 2.5-0.025 mg (LOMOTIL) 2.5-0.025 mg per tablet Take 1 tablet by mouth 4 (four) times daily. for 10 days    HYDROcodone-acetaminophen (NORCO) 5-325 mg per tablet One or 2 every 4-6 hours as needed for pain    insulin glargine (LANTUS) 100 unit/mL injection 50 units bid prn when BS< 150    IRON/VITAMIN B COMPLEX (GERITOL ORAL) Take by mouth.    lancets Misc For tel care    lisinopril (PRINIVIL,ZESTRIL) 5 MG tablet TAKE 1 TABLET (5 MG TOTAL) BY MOUTH ONCE DAILY. (Patient taking differently: Take 5 mg by mouth every morning. )    metOLazone (ZAROXOLYN) 2.5 MG tablet Take 1 tablet (2.5 mg total) by mouth every Mon, Wed, Fri.  "   metroNIDAZOLE (FLAGYL) 500 MG tablet Take 1 tablet (500 mg total) by mouth every 12 (twelve) hours. The night before surgery for 14 doses    pantoprazole (PROTONIX) 40 MG tablet TAKE 1 TABLET BY MOUTH EVERY DAY FOR ACID REFLUX    pen needle, diabetic (BD ULTRA-FINE JOSLYN PEN NEEDLE) 32 gauge x 5/32" Ndle 1 each by Misc.(Non-Drug; Combo Route) route 4 (four) times daily.    potassium chloride SA (K-DUR,KLOR-CON) 10 MEQ tablet TAKE 1 TABLET BY MOUTH EVERY DAY (Patient not taking: Reported on 11/15/2018)    psyllium husk, aspartame, (METAMUCIL) 3.4 gram PwPk packet Take 1 packet by mouth once daily.    simvastatin (ZOCOR) 10 MG tablet TAKE 1 TABLET (10 MG TOTAL) BY MOUTH EVERY EVENING.     Family History     Problem Relation (Age of Onset)    Cancer Mother    Heart disease Father    No Known Problems Sister, Brother, Maternal Aunt, Maternal Uncle, Paternal Aunt, Paternal Uncle, Maternal Grandmother, Maternal Grandfather, Paternal Grandmother, Paternal Grandfather    Stroke Father        Tobacco Use    Smoking status: Never Smoker    Smokeless tobacco: Never Used   Substance and Sexual Activity    Alcohol use: Yes     Alcohol/week: 0.0 oz     Comment: NO ALCOHOL 72 HOURS PRIOR TO SX    Drug use: No    Sexual activity: Not Currently     Review of Systems   Constitution: Positive for weakness and malaise/fatigue. Negative for diaphoresis, weight gain and weight loss.   HENT: Negative for congestion and nosebleeds.    Cardiovascular: Negative for chest pain, claudication, cyanosis, dyspnea on exertion, irregular heartbeat, leg swelling, near-syncope, orthopnea, palpitations, paroxysmal nocturnal dyspnea and syncope.   Respiratory: Positive for shortness of breath (chronic ). Negative for cough, hemoptysis, sleep disturbances due to breathing, snoring, sputum production and wheezing.    Hematologic/Lymphatic: Negative for bleeding problem. Does not bruise/bleed easily.   Skin: Negative for rash. "   Musculoskeletal: Negative for arthritis, back pain, falls, joint pain, muscle cramps and muscle weakness.   Gastrointestinal: Positive for bloating. Negative for abdominal pain, constipation, diarrhea, heartburn, hematemesis, hematochezia, melena and nausea.        Ileostomy    Genitourinary: Negative for dysuria, hematuria and nocturia.   Neurological: Negative for excessive daytime sleepiness, dizziness, headaches, light-headedness, loss of balance, numbness and vertigo.     Objective:     Vital Signs (Most Recent):  Temp: 99.1 °F (37.3 °C) (11/16/18 0730)  Pulse: 71 (11/16/18 1100)  Resp: 18 (11/16/18 0730)  BP: (!) 116/57 (11/16/18 0730)  SpO2: 97 % (11/16/18 0730) Vital Signs (24h Range):  Temp:  [97.5 °F (36.4 °C)-99.1 °F (37.3 °C)] 99.1 °F (37.3 °C)  Pulse:  [71-92] 71  Resp:  [16-20] 18  SpO2:  [95 %-99 %] 97 %  BP: ()/(48-57) 116/57     Weight: 89.8 kg (197 lb 15.6 oz)  Body mass index is 30.1 kg/m².    SpO2: 97 %  O2 Device (Oxygen Therapy): room air      Intake/Output Summary (Last 24 hours) at 11/16/2018 1318  Last data filed at 11/16/2018 0947  Gross per 24 hour   Intake 1295 ml   Output 890 ml   Net 405 ml       Lines/Drains/Airways     Drain                 Closed/Suction Drain 10/30/18 1300 Abdomen Bulb 17 days         Ileostomy 10/30/18 Loop RUQ 17 days         Urethral Catheter 11/15/18 1700 Double-lumen 16 Fr. less than 1 day          Pressure Ulcer                 Negative Pressure Wound Therapy  15 days          Peripheral Intravenous Line                 Peripheral IV - Single Lumen 11/16/18 1235 Right;Posterior Wrist less than 1 day                Physical Exam   Constitutional: He is oriented to person, place, and time. He appears well-developed and well-nourished.   Neck: Neck supple. No JVD present.   Cardiovascular: Normal rate, regular rhythm, normal heart sounds and normal pulses. Exam reveals no friction rub.   No murmur heard.  Pulmonary/Chest: Effort normal and breath sounds  normal. No respiratory distress. He has no wheezes. He has no rales.   Abdominal: Soft. Bowel sounds are normal. He exhibits no distension.   ileostomy in place, wound VAC on the left lower quadrant    Musculoskeletal: He exhibits no edema or tenderness.   Neurological: He is alert and oriented to person, place, and time.   Skin: Skin is warm and dry. No rash noted.   Psychiatric: He has a normal mood and affect. His behavior is normal.   Nursing note and vitals reviewed.      Significant Labs:   All pertinent lab results from the last 24 hours have been reviewed. and   Recent Lab Results       11/16/18  0517   11/16/18  0437   11/16/18  0053   11/15/18  2333   11/15/18  2122        Procalcitonin               Albumin   2.6              2.6           Alkaline Phosphatase   83           ALT   51           Anion Gap   14              14           Appearance, UA       Clear       AST   50           Baso #   0.02           Basophil%   0.1           Bilirubin (UA)       1+  Comment:  Positive urine bilirubin is not confirmed. Correlate with   serum bilirubin and clinical presentation.         Total Bilirubin   0.9  Comment:  For infants and newborns, interpretation of results should be based  on gestational age, weight and in agreement with clinical  observations.  Premature Infant recommended reference ranges:  Up to 24 hours.............<8.0 mg/dL  Up to 48 hours............<12.0 mg/dL  3-5 days..................<15.0 mg/dL  6-29 days.................<15.0 mg/dL             Blood Culture, Routine         No Growth to date[P]     BNP               BUN, Bld   72              72           Calcium   9.1              9.1           Chloride   100              100           Cholesterol   91  Comment:  The National Cholesterol Education Program (NCEP) has set the  following guidelines (reference ranges) for Cholesterol:  Optimal.....................<200 mg/dL  Borderline High.............200-239  mg/dL  High........................> or = 240 mg/dL             CO2   18              18           Color, UA       Yellow       Creatinine   8.0              8.0           Creatinine, Random Ur       >450.0  Comment:  The random urine reference ranges provided were established   for 24 hour urine collections.  No reference ranges exist for  random urine specimens.  Correlate clinically.                >450.0  Comment:  The random urine reference ranges provided were established   for 24 hour urine collections.  No reference ranges exist for  random urine specimens.  Correlate clinically.         CRP               Differential Method   Automated           eGFR if    7              7           eGFR if non    6  Comment:  Calculation used to obtain the estimated glomerular filtration  rate (eGFR) is the CKD-EPI equation.                 6  Comment:  Calculation used to obtain the estimated glomerular filtration  rate (eGFR) is the CKD-EPI equation.              Eos #   0.1           Eosinophil%   0.8           Estimated Avg Glucose   123           Glucose   144              144           Glucose, UA       Negative       Gran # (ANC)   11.6           Gran%   82.1           HDL   21  Comment:  The National Cholesterol Education Program (NCEP) has set the  following guidelines (reference values) for HDL Cholesterol:  Low...............<40 mg/dL  Optimal...........>60 mg/dL             HDL/Chol Ratio   23.1           Hematocrit   27.4           Hemoglobin   9.2           Hemoglobin A1C   5.9  Comment:  ADA Screening Guidelines:  5.7-6.4%  Consistent with prediabetes  >or=6.5%  Consistent with diabetes  High levels of fetal hemoglobin interfere with the HbA1C  assay. Heterozygous hemoglobin variants (HbS, HgC, etc)do  not significantly interfere with this assay.   However, presence of multiple variants may affect accuracy.             Coumadin Monitoring INR   1.1  Comment:  Coumadin  Therapy:  2.0 - 3.0 for INR for all indicators except mechanical heart valves  and antiphospholipid syndromes which should use 2.5 - 3.5.             Ketones, UA       Trace       Lactate, Carlton               LDL Cholesterol   46.2  Comment:  The National Cholesterol Education Program (NCEP) has set the  following guidelines (reference values) for LDL Cholesterol:  Optimal.......................<130 mg/dL  Borderline High...............130-159 mg/dL  High..........................160-189 mg/dL  Very High.....................>190 mg/dL             Leukocytes, UA       Negative       Lymph #   1.2           Lymph%   8.4           MCH   27.5           MCHC   33.6           MCV   82           Mono #   1.2           Mono%   8.6           MPV   10.2           Nitrite, UA       Negative       Non-HDL Cholesterol   70  Comment:  Risk category and Non-HDL cholesterol goals:  Coronary heart disease (CHD)or equivalent (10-year risk of CHD >20%):  Non-HDL cholesterol goal     <130 mg/dL  Two or more CHD risk factors and 10-year risk of CHD <= 20%:  Non-HDL cholesterol goal     <160 mg/dL  0 to 1 CHD risk factor:  Non-HDL cholesterol goal     <190 mg/dL             Occult Blood UA       Trace       pH, UA       5.0       Phosphorus   6.6              6.6           Platelets   365           POCT Glucose 126   141         Potassium   4.1              4.1           Prot/Creat Ratio, Ur       Unable to calculate       Total Protein   7.2           Protein, UA       Negative  Comment:  Recommend a 24 hour urine protein or a urine   protein/creatinine ratio if globulin induced proteinuria is  clinically suspected.         Protein, Urine Random       38  Comment:  The random urine reference ranges provided were established   for 24 hour urine collections.  No reference ranges exist for  random urine specimens.  Correlate clinically.         Protime   12.0           RBC   3.35           RDW   15.4           Sed Rate               Sodium    132              132           Sodium Urine Random       <20  Comment:  The random urine reference ranges provided were established   for 24 hour urine collections.  No reference ranges exist for  random urine specimens.  Correlate clinically.         Specific Gravity, UA       >=1.030       Specimen UA       Urine, Catheterized       Total Cholesterol/HDL Ratio   4.3           Triglycerides   119  Comment:  The National Cholesterol Education Program (NCEP) has set the  following guidelines (reference values) for triglycerides:  Normal......................<150 mg/dL  Borderline High.............150-199 mg/dL  High........................200-499 mg/dL             TSH   1.506           Urobilinogen, UA       Negative       WBC   14.12                            11/15/18  2121   11/15/18  2002   11/15/18  1739        Procalcitonin     0.47  Comment:  A concentration < 0.25 ng/mL represents a low risk bacterial   infection.  Procalcitonin may not be accurate among patients with localized   infection, recent trauma or major surgery, immunosuppressed state,   invasive fungal infection, renal dysfunction. Decisions regarding   initiation or continuation of antibiotic therapy should not be based   solely on procalcitonin levels.       Albumin           Alkaline Phosphatase           ALT           Anion Gap           Appearance, UA           AST           Baso #           Basophil%           Bilirubin (UA)           Total Bilirubin           Blood Culture, Routine   No Growth to date[P]         Comment:  Values of less than 100 pg/ml are consistent with non-CHF populations.         BUN, Bld           Calcium           Chloride           Cholesterol           CO2           Color, UA           Creatinine           Creatinine, Random Ur           CRP 67.3         Differential Method           eGFR if            eGFR if non            Eos #           Eosinophil%           Estimated Avg  Glucose           Glucose           Glucose, UA           Gran # (ANC)           Gran%           HDL           HDL/Chol Ratio           Hematocrit           Hemoglobin           Hemoglobin A1C           Coumadin Monitoring INR           Ketones, UA           Lactate, Carlton 0.9  Comment:  Falsely low lactic acid results can be found in samples   containing >=13.0 mg/dL total bilirubin and/or >=3.5 mg/dL   direct bilirubin.           LDL Cholesterol           Leukocytes, UA           Lymph #           Lymph%           MCH           MCHC           MCV           Mono #           Mono%           MPV           Nitrite, UA           Non-HDL Cholesterol           Occult Blood UA           pH, UA           Phosphorus           Platelets           POCT Glucose           Potassium           Prot/Creat Ratio, Ur           Total Protein           Protein, UA           Protein, Urine Random           Protime           RBC           RDW           Sed Rate 112         Sodium           Sodium Urine Random           Specific Gravity, UA           Specimen UA           Total Cholesterol/HDL Ratio           Triglycerides           TSH           Urobilinogen, UA           WBC                 Significant Imaging: Echocardiogram:   2D echo with color flow doppler:   Results for orders placed or performed in visit on 10/01/18   2D echo with color flow doppler   Result Value Ref Range    QEF 30 (A) 55 - 65    Diastolic Dysfunction Yes (A)     Aortic Valve Regurgitation MILD TO MODERATE (A)     Est. PA Systolic Pressure 35.49     Tricuspid Valve Regurgitation MILD      Assessment and Plan:     Acute on chronic renal failure    -Likely from over diuresis. Patient needing aggressive IV hydration , but needs close monitoring for fluid overload.   -Will diurese later if needed      Chronic combined systolic and diastolic congestive heart failure    -Patient echo in Oct 2018 shows Moderately depressed left ventricular systolic function (EF 30-35%  and RV dysfunction   -Has no evidence of volume overload on exam. Patient appears quite dry on exam and needs IV hydration  -Will see how he responds and if diuresis needed, will address at that. Will start Dobutamine if patient decompensates, but he has significant BROOKE needing IV hydration.   -Continue BB  -Stop ACEI to avoid worsening renal function   -Give Hydralazine if needed for BP control            VTE Risk Mitigation (From admission, onward)        Ordered     Place sequential compression device  Until discontinued      11/15/18 2006     IP VTE HIGH RISK PATIENT  Once      11/15/18 2006        Chart reviewed. Patient examined by Dr. Mercer and agrees with plan that has been outlined.     Thank you for your consult. I will follow-up with patient. Please contact us if you have any additional questions.    MANPREET Vivas  Cardiology   [facility] [place]

## 2018-11-16 NOTE — HPI
Patient is a 63 y.o. male presents after a colostomy reversal, repair of parastomal hernia and protective ileostomy with complaint of purulent material from the drain. Patient was direct admitted by General Surgery. Renal was consulted for Acute on chronic kidney injury. Renal Note reviewed and plans for gentle hydration overnight.  consulted for assistance with medical management. Patient seen and examined in his room. Patient with no current complaints other than purulent drain discharge. No modifying factors, no associated symptoms. Disucssed case with Dr. Lindsay patient is to receive both flagyl and cipro and CT ABD pending. Significant medical conditions include CAD, CHF, HTN, DM2.

## 2018-11-16 NOTE — HOSPITAL COURSE
Patient was admitted from clinic.  He was started on IV fluids for acute on chronic renal failure.  A CT scan of the abdomen and pelvis with oral and rectal contrast was performed.  It confirmed the contained leak from his colostomy reversal.  The drain is in position of the leak.    The patient was seen by Nephrology and Cardiology consults were obtained.    Patient is feeling better.  His BUN and creatinine are improving.  His CT scan was reviewed with Radiology in the tip of the drain is in the fluid collection.  He is starting to have semi formed ileostomy output.    Patient continues to feel better, his ileostomy output it was more warmth, there is some liquid in the bag.  His BUN and creatinine approving the.  His drain is clearing    Feeling better, renal function improved, drain has purulent/feculect output that clear with flushing,

## 2018-11-16 NOTE — ASSESSMENT & PLAN NOTE
Monitor output.  The patient will be on Metamucil and Lomotil.  We may need to add other medications slowed down the flow of output is this likely contributed to his acute renal failure

## 2018-11-16 NOTE — PLAN OF CARE
Met with patient and wife at bedside to complete discharge planning assessment. Patient's wife, Jessica Choudhury [phone] at bedside for support and confirmed patient demographics and contact list. Wife reports that they are not anticipating any additional discharge needs at this time, other than resuming [facility]. MSW provided patient preference form and wife signed to resume  services with current agency. MSW informed wife that CM would continue to follow up if discharge recommendations change. Wife reports that during patients clinic visit yesterday, Dr. Lindsay noticed abnormal readings and sent the patient back here to [facility]. Wife reports that patients adult son will provide transportation home and to follow up appointments. MSW provided Transitional Care Folder with information on Advance Directives, Discharge Planning Begins on Admission pamphlet, and [facility] Bedside Delivery program. MSW informed wife that on-going CM would be Angeli JACQUES. Updated patient white board with current discharge plan and CM contact information. Encouraged wife and patient to contact CM with any questions or concerns that may arise.       [id number] [place] - [address] AT [facility]  [address] [place] [address]  Phone: [phone] Fax: [phone]    Sandra Estevez MD  Payor: MEDICARE / Plan: MEDICARE PART A & B / Product Type: [facility] /         11/16/18 1259   Discharge Assessment   Assessment Type Discharge Planning Assessment   Confirmed/corrected address and phone number on facesheet? Yes   Assessment information obtained from? Patient;Caregiver   Communicated expected length of stay with patient/caregiver yes   Prior to hospitilization cognitive status: Alert/Oriented   Prior to hospitalization functional status: Independent;Assistive Equipment   Current cognitive status: Alert/Oriented   Current Functional Status:  Independent;Assistive Equipment   Lives With spouse;child(patrica), adult   Able to Return to Prior Arrangements yes   Is patient able to care for self after discharge? Yes   Who are your caregiver(s) and their phone number(s)? Jessica Choudhury, wife [phone]   Patient's perception of discharge disposition admitted as an inpatient   Readmission Within The Last 30 Days planned readmission   If yes, most recent facility name: [facility]   Patient currently being followed by outpatient case management? No   Patient currently receives any other outside agency services? No   Equipment Currently Used at Home oxygen;glucometer;grab bar;cane, straight   Do you have any problems affording any of your prescribed medications? No   Is the patient taking medications as prescribed? yes   Does the patient have transportation home? Yes   Transportation Available car;family or friend will provide   Does the patient receive services at the [facility]? No   Discharge Plan A Home with family;Home Health   Patient/Family In Agreement With Plan yes   Readmission Questionnaire   At the time of your discharge, did someone talk to you about what your health problems were? Yes   At the time of discharge, did someone talk to you about what to watch out for regarding worsening of your health problem? Yes   At the time of discharge, did someone talk to you about what to do if you experienced worsening of your health problem? Yes   At the time of discharge, did someone talk to you about which medication to take when you left the hospital and which ones to stop taking? Yes   At the time of discharge, did someone talk to you about when and where to follow up with a doctor after you left the hospital? Yes   What do you believe caused you to be sick enough to be re-admitted? wife reports labs from clinic visit yesterday came back abnormal and Dr. Lindsay sent patient back to [facility]   How often do you need to have  someone help you when you read instructions, pamphlets, or other written material from your doctor or pharmacy? Never   Do you have problems taking your medications as prescribed? No   Do you have any problems affording any of  your prescribed medications? No   Do you have problems obtaining/receiving your medications? No   Does the patient have transportation to healthcare appointments? Yes   Living Arrangements house   Does the patient have family/friends to help with healtcare needs after discharge? yes   Does your caregiver provide all the help you need? Yes   Are you currently feeling confused? No   Are you currently having problems thinking? No   Are you currently having memory problems? No   Have you felt down, depressed, or hopeless? 0   Have you felt little interest or pleasure in doing things? 0   In the last 7 days, my sleep quality was: good

## 2018-11-16 NOTE — PLAN OF CARE
11/16/18 1240   Medicare Message   Important Message from Medicare regarding Discharge Appeal Rights Given to patient/caregiver;Explained to patient/caregiver;Signed/date by patient/caregiver   Date IMM was signed 11/16/18   Time IMM was signed 1240

## 2018-11-16 NOTE — HPI
Patient is a 63 y.o. male CAD/ICM, DCM, LBBB, HTN, CRI, DM, CRT-ICD,  PTHN, dyslipidemia, obesity. Nonsmoker.   LHC was done 2009 showed diffuse CAD, and severe distal CAD involving apical LAD, distal RCA/distal LCX.   Echo Oct 2010 showed LVEF 25%.   PET stress 2016 showed inferior scar, no significant ischemia noted and LVEF < 35%. Is s/p BIV ICD 3/16 for CHF/LBBB.  Echo 12/17 EF 20%, DD, mod PHTN and unchanged in Oct 2018.  Patient presented to [facility] as a direct admit from general surgery office. Is post colostomy reversal, repair of parastomal hernia and protective ileostomy. Had reports of purulent material from the drain and complained of fatigue. Labs checked as an OP and noted to have BROOKE. Baseline creatine around 1.8- 2, but creatine of 8.2 on admit. Patient takes diuretics as an OP and sometimes adjusts dose on his own. Has been treated with abx and IVF. . CXR shows no acute process.

## 2018-11-16 NOTE — CONSULTS
Consult received   Chart reviewed  patient assessed  Family at bedside for info  He was seen by this nurse on a recent admit     See note 10-31-18   He denies any diabetes changes since that time  He reports was diagnosed over 25 years ago  Has a home glucose monitor and checks 2 times daily   Reports averages over the past 2 weeks have been less than 150  He is consistent with administering his insulin  Brief time with patient as he is uncomfortable at this time  He does not identify any new diabetes education needs and does not need literature

## 2018-11-16 NOTE — ASSESSMENT & PLAN NOTE
-General surgery managing  -IV antibiotics  -no indication for further surgical intervention for now

## 2018-11-16 NOTE — CONSULTS
Consulted on this 62 y/o M patient for wound vac and Ileostomy management. Patient is found lying in bed, awake and alert with wife at bedside. He is well know to wound care service.  He denies pain at present.  RUQ Ileostomy was placed 3 weeks ago. Pouch noted to have small leak, so removed and changed at this time. Loop Ileostomy noted to RUQ with moist red protruding stoma and moderate amount thin liquid dark green/brown effluent in pouch.  Cleansed with saline and patted dry.  Cyndie stomal breakdown noted at 3 o'clock and 9 o'clock in location where loop ileostomy starr had been removed yesterday in office. Stage 3 pressure injuries noted to each side each measuring 0.3x1.5x0.1cm with moist yellow slough noted to pink wound beds and scant serous drainage. Cleansed with saline. Painted cyndie stomal skin with cavilon.  Rodriguez paste applied to pressure injuries, then Brava moldable ring applied to stoma, covering wounds.  One piece sensura Conrado pouch cut to size and applied with good seal noted.  Patient tolerated well.  Discussed Wound vac to Q old colostomy site with patient. Dressing was changed yesterday in Dr. Lindsay's office and documented.  Switched over from home wound vac to hospital vac at this time at continuous -125 mmHg low intensity suction. Plan for dressing change on Monday per usual schedule.  Home vac given to wife at this time. Plan to charge and reapply homevac at time of discharge.  Patient tolerated well.

## 2018-11-16 NOTE — ASSESSMENT & PLAN NOTE
-Likely from over diuresis. Patient needing aggressive IV hydration , but needs close monitoring for fluid overload.   -Will diurese later if needed

## 2018-11-16 NOTE — SIGNIFICANT EVENT
Dr. Still notified by stat rad that CT shows leakage of contrast from colon into abd. Dr. Lindsay notified via secure chat, acknowledges and no new orders.

## 2018-11-16 NOTE — PROGRESS NOTES
[facility] -   General Surgery  Progress Note    Subjective:     History of Present Illness:  Patient is a 63 y.o. male presents after a colostomy reversal, repair of parastomal hernia and protective ileostomy.     The patient states he is doing okay but he is a bit tired and fatigued he states that purulent material started coming out of his drain about 2 days ago.  He denies any fever or chills.  He states that his ileostomy output is more formed.  The patient was admitted from clinic once we noticed that his BUN and creatinine were significantly elevated        Post-Op Info:  * No surgery found *         Interval History:  Patient states he is feeling fine other than being a bit weak and in fatigue    Medications:  Continuous Infusions:   sodium chloride 0.9% 100 mL/hr at 11/15/18 2214     Scheduled Meds:   allopurinol  100 mg Oral Daily    carvedilol  25 mg Oral Daily    ciprofloxacin (CIPRO)400mg/200ml D5W IVPB  400 mg Intravenous Q12H    lidocaine (PF) 10 mg/ml (1%)  1 mL Other Once    lisinopril  5 mg Oral Daily    metronidazole  500 mg Intravenous Q8H    pantoprazole  40 mg Oral Daily    psyllium husk  1 packet Oral Daily    simvastatin  10 mg Oral QHS     PRN Meds:dextrose 50%, glucagon (human recombinant), insulin aspart U-100, ondansetron     Review of patient's allergies indicates:  No Known Allergies  Objective:     Vital Signs (Most Recent):  Temp: 99.1 °F (37.3 °C) (11/16/18 0730)  Pulse: 71 (11/16/18 1100)  Resp: 18 (11/16/18 0730)  BP: (!) 116/57 (11/16/18 0730)  SpO2: 97 % (11/16/18 0730) Vital Signs (24h Range):  Temp:  [97.5 °F (36.4 °C)-99.1 °F (37.3 °C)] 99.1 °F (37.3 °C)  Pulse:  [71-92] 71  Resp:  [16-20] 18  SpO2:  [95 %-99 %] 97 %  BP: ()/(48-57) 116/57     Weight: 89.8 kg (197 lb 15.6 oz)  Body mass index is 30.1 kg/m².    Intake/Output - Last 3 Shifts       11/14 0700 - 11/15 0659 11/15 0700 - 11/16 0659 11/16 0700 - 11/17 0659    P.O.  0 120    I.V. (mL/kg)   775 (8.6)     IV Piggyback  400     Total Intake(mL/kg)  1175 (13.1) 120 (1.3)    Urine (mL/kg/hr)  205     Drains  160     Other  0     Stool  525     Total Output  890     Net  +285 +120           Stool Occurrence  1 x           Physical Exam   Constitutional: He is oriented to person, place, and time. No distress.   Slightly acute and chronically ill   HENT:   Head: Normocephalic.   Neck: Normal range of motion.   Cardiovascular: Normal rate, regular rhythm and normal heart sounds.   Pulmonary/Chest: Effort normal and breath sounds normal.   Abdominal: Soft. Bowel sounds are normal.   Right upper quadrant colostomy, midline incision that is healed.  Wound VAC to left abdominal wall.  Drain with now seropurulent drainage   Neurological: He is oriented to person, place, and time.   Skin: Skin is warm and dry.   Nursing note and vitals reviewed.      Significant Labs:  CBC:   Recent Labs   Lab 11/16/18  0437   WBC 14.12*   RBC 3.35*   HGB 9.2*   HCT 27.4*   *   MCV 82   MCH 27.5   MCHC 33.6     BMP:   Recent Labs   Lab 11/16/18  0437   *  144*   *  132*   K 4.1  4.1     100   CO2 18*  18*   BUN 72*  72*   CREATININE 8.0*  8.0*   CALCIUM 9.1  9.1     CMP:   Recent Labs   Lab 11/16/18  0437   *  144*   CALCIUM 9.1  9.1   ALBUMIN 2.6*  2.6*   PROT 7.2   *  132*   K 4.1  4.1   CO2 18*  18*     100   BUN 72*  72*   CREATININE 8.0*  8.0*   ALKPHOS 83   ALT 51*   AST 50*   BILITOT 0.9       Significant Diagnostics:  CT: I have reviewed all pertinent results/findings within the past 24 hours and my personal findings are:        EXAMINATION:  CT ABDOMEN PELVIS WITHOUT CONTRAST    CLINICAL HISTORY:  Post operative infection, abdomen pelvis; Peritoneal abscess    TECHNIQUE:  Low dose axial images, sagittal and coronal reformations were obtained from the lung bases to the pubic symphysis.    COMPARISON:  None    FINDINGS:  Heart: Mild cardiomegaly.  Cardiac pacing  wires are noted.  Severe coronary artery disease.    Lung Bases: Mild basilar atelectasis and dependent changes.    Liver: Normal size and attenuation. No focal lesions.    Gallbladder: Surgically absent.    Bile Ducts: No dilatation.    Pancreas: No obvious mass. No peripancreatic fat stranding.    Spleen: Normal.    Adrenals: Normal.    Kidneys/Ureters: Mild perinephric stranding.  No mass, hydroureteronephrosis, or nephroureterolithiasis.    Bladder: Collapsed with Guzman catheter in place    Reproductive organs: Unremarkable    GI Tract/Mesentery: Contrast is seen within the esophagus which could reflect reflux disease.  Mild mucosal thickening is seen at the GE junction.  No evidence of bowel obstruction or inflammation.    Right hemicolectomy noted.  Ostomy is seen within the right lower quadrant.  Postoperative changes are seen within the left lower quadrant with drain extending into the pelvis.  Stranding is seen within the mesentery and omentum.  Rectal contrast was administered which demonstrates sigmoid perforation with a collection of contrast and air within the anterior pelvis.  Kenny-Kohli drain tip projects within the collection.  Collection measures approximately 3 x 2.4 x 5.1 Cm.  Scattered diverticulosis throughout the colon..    Peritoneal Space: No ascites or free air.    Retroperitoneum: No significant adenopathy.    Abdominal wall: Stranding is seen along the left anterior chest wall.  Midline scar is noted.    Vasculature: No aneurysm. Aorta demonstrates atherosclerotic disease.    Bones: No acute fracture.  Moderate degenerative changes are noted within the bones.  No suspicious lytic or sclerotic lesions.      Impression       Rectal contrast was administered which demonstrates sigmoid perforation with a collection of contrast and air within the anterior pelvis.  Kenny-Kohli drain tip projects within the collection.  Collection measures approximately 3 x 2.4 x 5.1 cm.    All CT scans at  this facility use dose modulation, iterative reconstruction, and/or weight based dosing when appropriate to reduce radiation dose to as low as reasonable achievable.      Electronically signed by: Rhys Blackmon MD  Date: 11/16/2018  Time: 08:04            Assessment/Plan:     * Open wound of anterior abdominal wall with complication    Wound VAC, wound VAC changes Monday Wednesday and Friday.  Wound care consult     Acute on chronic renal failure    Nephrology consult     Ileostomy in place    Monitor output.  The patient will be on Metamucil and Lomotil.  We may need to add other medications slowed down the flow of output is this likely contributed to his acute renal failure     Chronic combined systolic and diastolic congestive heart failure    Cardiology evaluation as well as Medicine consult     Gastroesophageal reflux disease    Proton pump inhibitor and monitor     Type 2 diabetes mellitus with stage 4 chronic kidney disease, with long-term current use of insulin    Medicine consult as well as nephrology consultation     Ischemic cardiomyopathy    Cardiology consult for further management     Hypertension associated with diabetes    Will consult Medicine for further management         Kevin Lindsay MD  General Surgery  [facility] -

## 2018-11-16 NOTE — ASSESSMENT & PLAN NOTE
-Creatine currently 8 with metabolic acidosis. Renal ultrasound pending.   -Nephrology following; urine studies abnormal and will be managed by Nephrology team  -continue gentle hydration

## 2018-11-16 NOTE — PLAN OF CARE
11/16/18 3768   Post-Acute Status   Post-Acute Authorization Home Health/Hospice   Home Health/Hospice Status Referrals Sent

## 2018-11-16 NOTE — SUBJECTIVE & OBJECTIVE
Past Medical History:   Diagnosis Date    Arthritis     CAD (coronary artery disease)     Cataract     CHF (congestive heart failure)     Dr Calvillo    Chronic combined systolic and diastolic congestive heart failure 3/24/2015    CKD (chronic kidney disease), stage III     Diabetes mellitus type II      AM    Diabetic retinopathy     anti Veg F injections for macular edema    Diverticulitis of colon     ED (erectile dysfunction)     Glaucoma     HTN (hypertension)     Hyperlipidemia     Ischemic cardiomyopathy     Obesity     JAHAIRA on CPAP     Pacemaker     Pulmonary HTN        Past Surgical History:   Procedure Laterality Date    CARDIAC CATHETERIZATION  2009    CHOLECYSTECTOMY      COLECTOMY-SIGMOID N/A 4/22/2016    Performed by Kevin Lindsay MD at [facility] OR    COLONOSCOPY N/A 10/11/2018    Procedure: COLONOSCOPY;  Surgeon: Quinn Aragon MD;  Location: [facility] ENDO;  Service: General;  Laterality: N/A;    COLONOSCOPY N/A 10/11/2018    Performed by Quinn Aragon MD at [facility] ENDO    COLOSTOMY N/A 4/22/2016    Performed by Kevin Lindsay MD at [facility] OR    CREATION, ILEOSTOMY N/A 10/30/2018    Performed by Kevin Lindsay MD at [facility] OR    EYE SURGERY      HEMICOLECTOMY, RIGHT Right 10/30/2018    Performed by Kevin Lindsay MD at [facility] OR    ILEOSTOMY N/A 10/30/2018    Procedure: CREATION, ILEOSTOMY;  Surgeon: Kevin Lindsay MD;  Location: [facility] OR;  Service: General;  Laterality: N/A;    KNEE SURGERY      MOBILIZATION OF SPLENIC FLEXURE N/A 10/30/2018    Procedure: MOBILIZATION, SPLENIC FLEXURE;  Surgeon: Kevin Lindsay MD;  Location: [facility] OR;  Service: General;  Laterality: N/A;    MOBILIZATION, SPLENIC FLEXURE N/A 10/30/2018    Performed by Kevin Lindsay MD at [facility] OR    REVISION COLOSTOMY N/A 10/30/2018    Procedure: REVISION OR CLOSURE, COLOSTOMY;  Surgeon: Kevin Lindsay MD;  Location: [facility] OR;  Service: General;  Laterality: N/A;  Parastomal Hernia  Repair    REVISION OR CLOSURE, COLOSTOMY N/A 10/30/2018    Performed by Kevin Lindsay MD at [facility] OR    RIGHT HEMICOLECTOMY Right 10/30/2018    Procedure: HEMICOLECTOMY, RIGHT;  Surgeon: Kevin Lindsay MD;  Location: [facility] OR;  Service: General;  Laterality: Right;       Review of patient's allergies indicates:  No Known Allergies    No current facility-administered medications on file prior to encounter.      Current Outpatient Medications on File Prior to Encounter   Medication Sig    allopurinol (ZYLOPRIM) 100 MG tablet TAKE 1 TABLET (100 MG TOTAL) BY MOUTH ONCE DAILY. (Patient taking differently: TAKE 1 TABLET (100 MG TOTAL) BY MOUTH ONCE DAILY./ takes in AM)    aspirin (ECOTRIN) 81 MG EC tablet Take 81 mg by mouth every morning.     BD INSULIN SYRINGE ULTRA-FINE 0.5 mL 31 gauge x 5/16 Syrg USE AS DIRECTED TO INJECT INSULIN TWO TIMES DAILY    bimatoprost (LUMIGAN) 0.03 % ophthalmic drops Place 1 drop into the left eye every evening. (Patient taking differently: Place 1 drop into the right eye every evening. )    blood sugar diagnostic Strp 1 strip by Misc.(Non-Drug; Combo Route) route 4 (four) times daily. [facility]    bumetanide (BUMEX) 2 MG tablet Take 1 tablet (2 mg total) by mouth 2 (two) times daily. (Patient taking differently: Take 2 mg by mouth every morning. )    carvedilol (COREG) 25 MG tablet TAKE 1/2 TABLET BY MOUTH TWICE A DAY WITH MEALS    cholecalciferol, vitamin D3, (VITAMIN D3) 1,000 unit capsule Take 1,000 Units by mouth once daily.    ciprofloxacin HCl (CIPRO) 500 MG tablet Take 1 tablet (500 mg total) by mouth every 12 (twelve) hours. for 14 days    diphenoxylate-atropine 2.5-0.025 mg (LOMOTIL) 2.5-0.025 mg per tablet Take 1 tablet by mouth 4 (four) times daily. for 10 days    HYDROcodone-acetaminophen (NORCO) 5-325 mg per tablet One or 2 every 4-6 hours as needed for pain    insulin glargine (LANTUS) 100 unit/mL injection 50 units bid prn when BS< 150    IRON/VITAMIN B  "COMPLEX (GERITOL ORAL) Take by mouth.    lancets Misc For tel care    lisinopril (PRINIVIL,ZESTRIL) 5 MG tablet TAKE 1 TABLET (5 MG TOTAL) BY MOUTH ONCE DAILY. (Patient taking differently: Take 5 mg by mouth every morning. )    metOLazone (ZAROXOLYN) 2.5 MG tablet Take 1 tablet (2.5 mg total) by mouth every Mon, Wed, Fri.    metroNIDAZOLE (FLAGYL) 500 MG tablet Take 1 tablet (500 mg total) by mouth every 12 (twelve) hours. The night before surgery for 14 doses    pantoprazole (PROTONIX) 40 MG tablet TAKE 1 TABLET BY MOUTH EVERY DAY FOR ACID REFLUX    pen needle, diabetic (BD ULTRA-FINE JOSLYN PEN NEEDLE) 32 gauge x 5/32" Ndle 1 each by Misc.(Non-Drug; Combo Route) route 4 (four) times daily.    potassium chloride SA (K-DUR,KLOR-CON) 10 MEQ tablet TAKE 1 TABLET BY MOUTH EVERY DAY (Patient not taking: Reported on 11/15/2018)    psyllium husk, aspartame, (METAMUCIL) 3.4 gram PwPk packet Take 1 packet by mouth once daily.    simvastatin (ZOCOR) 10 MG tablet TAKE 1 TABLET (10 MG TOTAL) BY MOUTH EVERY EVENING.     Family History     Problem Relation (Age of Onset)    Cancer Mother    Heart disease Father    No Known Problems Sister, Brother, Maternal Aunt, Maternal Uncle, Paternal Aunt, Paternal Uncle, Maternal Grandmother, Maternal Grandfather, Paternal Grandmother, Paternal Grandfather    Stroke Father        Tobacco Use    Smoking status: Never Smoker    Smokeless tobacco: Never Used   Substance and Sexual Activity    Alcohol use: Yes     Alcohol/week: 0.0 oz     Comment: NO ALCOHOL 72 HOURS PRIOR TO SX    Drug use: No    Sexual activity: Not Currently     Review of Systems   Constitution: Positive for weakness and malaise/fatigue. Negative for diaphoresis, weight gain and weight loss.   HENT: Negative for congestion and nosebleeds.    Cardiovascular: Negative for chest pain, claudication, cyanosis, dyspnea on exertion, irregular heartbeat, leg swelling, near-syncope, orthopnea, palpitations, paroxysmal " nocturnal dyspnea and syncope.   Respiratory: Positive for shortness of breath (chronic ). Negative for cough, hemoptysis, sleep disturbances due to breathing, snoring, sputum production and wheezing.    Hematologic/Lymphatic: Negative for bleeding problem. Does not bruise/bleed easily.   Skin: Negative for rash.   Musculoskeletal: Negative for arthritis, back pain, falls, joint pain, muscle cramps and muscle weakness.   Gastrointestinal: Positive for bloating. Negative for abdominal pain, constipation, diarrhea, heartburn, hematemesis, hematochezia, melena and nausea.        Ileostomy    Genitourinary: Negative for dysuria, hematuria and nocturia.   Neurological: Negative for excessive daytime sleepiness, dizziness, headaches, light-headedness, loss of balance, numbness and vertigo.     Objective:     Vital Signs (Most Recent):  Temp: 99.1 °F (37.3 °C) (11/16/18 0730)  Pulse: 71 (11/16/18 1100)  Resp: 18 (11/16/18 0730)  BP: (!) 116/57 (11/16/18 0730)  SpO2: 97 % (11/16/18 0730) Vital Signs (24h Range):  Temp:  [97.5 °F (36.4 °C)-99.1 °F (37.3 °C)] 99.1 °F (37.3 °C)  Pulse:  [71-92] 71  Resp:  [16-20] 18  SpO2:  [95 %-99 %] 97 %  BP: ()/(48-57) 116/57     Weight: 89.8 kg (197 lb 15.6 oz)  Body mass index is 30.1 kg/m².    SpO2: 97 %  O2 Device (Oxygen Therapy): room air      Intake/Output Summary (Last 24 hours) at 11/16/2018 1318  Last data filed at 11/16/2018 0947  Gross per 24 hour   Intake 1295 ml   Output 890 ml   Net 405 ml       Lines/Drains/Airways     Drain                 Closed/Suction Drain 10/30/18 1300 Abdomen Bulb 17 days         Ileostomy 10/30/18 Loop RUQ 17 days         Urethral Catheter 11/15/18 1700 Double-lumen 16 Fr. less than 1 day          Pressure Ulcer                 Negative Pressure Wound Therapy  15 days          Peripheral Intravenous Line                 Peripheral IV - Single Lumen 11/16/18 1235 Right;Posterior Wrist less than 1 day                Physical Exam    Constitutional: He is oriented to person, place, and time. He appears well-developed and well-nourished.   Neck: Neck supple. No JVD present.   Cardiovascular: Normal rate, regular rhythm, normal heart sounds and normal pulses. Exam reveals no friction rub.   No murmur heard.  Pulmonary/Chest: Effort normal and breath sounds normal. No respiratory distress. He has no wheezes. He has no rales.   Abdominal: Soft. Bowel sounds are normal. He exhibits no distension.   ileostomy in place, wound VAC on the left lower quadrant    Musculoskeletal: He exhibits no edema or tenderness.   Neurological: He is alert and oriented to person, place, and time.   Skin: Skin is warm and dry. No rash noted.   Psychiatric: He has a normal mood and affect. His behavior is normal.   Nursing note and vitals reviewed.      Significant Labs:   All pertinent lab results from the last 24 hours have been reviewed. and   Recent Lab Results       11/16/18  0517   11/16/18  0437   11/16/18  0053   11/15/18  2333   11/15/18  2122        Procalcitonin               Albumin   2.6              2.6           Alkaline Phosphatase   83           ALT   51           Anion Gap   14              14           Appearance, UA       Clear       AST   50           Baso #   0.02           Basophil%   0.1           Bilirubin (UA)       1+  Comment:  Positive urine bilirubin is not confirmed. Correlate with   serum bilirubin and clinical presentation.         Total Bilirubin   0.9  Comment:  For infants and newborns, interpretation of results should be based  on gestational age, weight and in agreement with clinical  observations.  Premature Infant recommended reference ranges:  Up to 24 hours.............<8.0 mg/dL  Up to 48 hours............<12.0 mg/dL  3-5 days..................<15.0 mg/dL  6-29 days.................<15.0 mg/dL             Blood Culture, Routine         No Growth to date[P]     BNP               BUN, Bld   72              72           Calcium    9.1              9.1           Chloride   100              100           Cholesterol   91  Comment:  The National Cholesterol Education Program (NCEP) has set the  following guidelines (reference ranges) for Cholesterol:  Optimal.....................<200 mg/dL  Borderline High.............200-239 mg/dL  High........................> or = 240 mg/dL             CO2   18              18           Color, UA       Yellow       Creatinine   8.0              8.0           Creatinine, Random Ur       >450.0  Comment:  The random urine reference ranges provided were established   for 24 hour urine collections.  No reference ranges exist for  random urine specimens.  Correlate clinically.                >450.0  Comment:  The random urine reference ranges provided were established   for 24 hour urine collections.  No reference ranges exist for  random urine specimens.  Correlate clinically.         CRP               Differential Method   Automated           eGFR if    7              7           eGFR if non    6  Comment:  Calculation used to obtain the estimated glomerular filtration  rate (eGFR) is the CKD-EPI equation.                 6  Comment:  Calculation used to obtain the estimated glomerular filtration  rate (eGFR) is the CKD-EPI equation.              Eos #   0.1           Eosinophil%   0.8           Estimated Avg Glucose   123           Glucose   144              144           Glucose, UA       Negative       Gran # (ANC)   11.6           Gran%   82.1           HDL   21  Comment:  The National Cholesterol Education Program (NCEP) has set the  following guidelines (reference values) for HDL Cholesterol:  Low...............<40 mg/dL  Optimal...........>60 mg/dL             HDL/Chol Ratio   23.1           Hematocrit   27.4           Hemoglobin   9.2           Hemoglobin A1C   5.9  Comment:  ADA Screening Guidelines:  5.7-6.4%  Consistent with prediabetes  >or=6.5%  Consistent with  diabetes  High levels of fetal hemoglobin interfere with the HbA1C  assay. Heterozygous hemoglobin variants (HbS, HgC, etc)do  not significantly interfere with this assay.   However, presence of multiple variants may affect accuracy.             Coumadin Monitoring INR   1.1  Comment:  Coumadin Therapy:  2.0 - 3.0 for INR for all indicators except mechanical heart valves  and antiphospholipid syndromes which should use 2.5 - 3.5.             Ketones, UA       Trace       Lactate, Carlton               LDL Cholesterol   46.2  Comment:  The National Cholesterol Education Program (NCEP) has set the  following guidelines (reference values) for LDL Cholesterol:  Optimal.......................<130 mg/dL  Borderline High...............130-159 mg/dL  High..........................160-189 mg/dL  Very High.....................>190 mg/dL             Leukocytes, UA       Negative       Lymph #   1.2           Lymph%   8.4           MCH   27.5           MCHC   33.6           MCV   82           Mono #   1.2           Mono%   8.6           MPV   10.2           Nitrite, UA       Negative       Non-HDL Cholesterol   70  Comment:  Risk category and Non-HDL cholesterol goals:  Coronary heart disease (CHD)or equivalent (10-year risk of CHD >20%):  Non-HDL cholesterol goal     <130 mg/dL  Two or more CHD risk factors and 10-year risk of CHD <= 20%:  Non-HDL cholesterol goal     <160 mg/dL  0 to 1 CHD risk factor:  Non-HDL cholesterol goal     <190 mg/dL             Occult Blood UA       Trace       pH, UA       5.0       Phosphorus   6.6              6.6           Platelets   365           POCT Glucose 126   141         Potassium   4.1              4.1           Prot/Creat Ratio, Ur       Unable to calculate       Total Protein   7.2           Protein, UA       Negative  Comment:  Recommend a 24 hour urine protein or a urine   protein/creatinine ratio if globulin induced proteinuria is  clinically suspected.         Protein, Urine Random        38  Comment:  The random urine reference ranges provided were established   for 24 hour urine collections.  No reference ranges exist for  random urine specimens.  Correlate clinically.         Protime   12.0           RBC   3.35           RDW   15.4           Sed Rate               Sodium   132              132           Sodium Urine Random       <20  Comment:  The random urine reference ranges provided were established   for 24 hour urine collections.  No reference ranges exist for  random urine specimens.  Correlate clinically.         Specific Gravity, UA       >=1.030       Specimen UA       Urine, Catheterized       Total Cholesterol/HDL Ratio   4.3           Triglycerides   119  Comment:  The National Cholesterol Education Program (NCEP) has set the  following guidelines (reference values) for triglycerides:  Normal......................<150 mg/dL  Borderline High.............150-199 mg/dL  High........................200-499 mg/dL             TSH   1.506           Urobilinogen, UA       Negative       WBC   14.12                            11/15/18  2121   11/15/18  2002   11/15/18  1739        Procalcitonin     0.47  Comment:  A concentration < 0.25 ng/mL represents a low risk bacterial   infection.  Procalcitonin may not be accurate among patients with localized   infection, recent trauma or major surgery, immunosuppressed state,   invasive fungal infection, renal dysfunction. Decisions regarding   initiation or continuation of antibiotic therapy should not be based   solely on procalcitonin levels.       Albumin           Alkaline Phosphatase           ALT           Anion Gap           Appearance, UA           AST           Baso #           Basophil%           Bilirubin (UA)           Total Bilirubin           Blood Culture, Routine   No Growth to date[P]         Comment:  Values of less than 100 pg/ml are consistent with non-CHF populations.         BUN, Bld           Calcium           Chloride            Cholesterol           CO2           Color, UA           Creatinine           Creatinine, Random Ur           CRP 67.3         Differential Method           eGFR if            eGFR if non            Eos #           Eosinophil%           Estimated Avg Glucose           Glucose           Glucose, UA           Gran # (ANC)           Gran%           HDL           HDL/Chol Ratio           Hematocrit           Hemoglobin           Hemoglobin A1C           Coumadin Monitoring INR           Ketones, UA           Lactate, Carlton 0.9  Comment:  Falsely low lactic acid results can be found in samples   containing >=13.0 mg/dL total bilirubin and/or >=3.5 mg/dL   direct bilirubin.           LDL Cholesterol           Leukocytes, UA           Lymph #           Lymph%           MCH           MCHC           MCV           Mono #           Mono%           MPV           Nitrite, UA           Non-HDL Cholesterol           Occult Blood UA           pH, UA           Phosphorus           Platelets           POCT Glucose           Potassium           Prot/Creat Ratio, Ur           Total Protein           Protein, UA           Protein, Urine Random           Protime           RBC           RDW           Sed Rate 112         Sodium           Sodium Urine Random           Specific Gravity, UA           Specimen UA           Total Cholesterol/HDL Ratio           Triglycerides           TSH           Urobilinogen, UA           WBC                 Significant Imaging: Echocardiogram:   2D echo with color flow doppler:   Results for orders placed or performed in visit on 10/01/18   2D echo with color flow doppler   Result Value Ref Range    QEF 30 (A) 55 - 65    Diastolic Dysfunction Yes (A)     Aortic Valve Regurgitation MILD TO MODERATE (A)     Est. PA Systolic Pressure 35.49     Tricuspid Valve Regurgitation MILD

## 2018-11-16 NOTE — SUBJECTIVE & OBJECTIVE
Interval History:  Patient states he is feeling fine other than being a bit weak and in fatigue    Medications:  Continuous Infusions:   sodium chloride 0.9% 100 mL/hr at 11/15/18 2214     Scheduled Meds:   allopurinol  100 mg Oral Daily    carvedilol  25 mg Oral Daily    ciprofloxacin (CIPRO)400mg/200ml D5W IVPB  400 mg Intravenous Q12H    lidocaine (PF) 10 mg/ml (1%)  1 mL Other Once    lisinopril  5 mg Oral Daily    metronidazole  500 mg Intravenous Q8H    pantoprazole  40 mg Oral Daily    psyllium husk  1 packet Oral Daily    simvastatin  10 mg Oral QHS     PRN Meds:dextrose 50%, glucagon (human recombinant), insulin aspart U-100, ondansetron     Review of patient's allergies indicates:  No Known Allergies  Objective:     Vital Signs (Most Recent):  Temp: 99.1 °F (37.3 °C) (11/16/18 0730)  Pulse: 71 (11/16/18 1100)  Resp: 18 (11/16/18 0730)  BP: (!) 116/57 (11/16/18 0730)  SpO2: 97 % (11/16/18 0730) Vital Signs (24h Range):  Temp:  [97.5 °F (36.4 °C)-99.1 °F (37.3 °C)] 99.1 °F (37.3 °C)  Pulse:  [71-92] 71  Resp:  [16-20] 18  SpO2:  [95 %-99 %] 97 %  BP: ()/(48-57) 116/57     Weight: 89.8 kg (197 lb 15.6 oz)  Body mass index is 30.1 kg/m².    Intake/Output - Last 3 Shifts       11/14 0700 - 11/15 0659 11/15 0700 - 11/16 0659 11/16 0700 - 11/17 0659    P.O.  0 120    I.V. (mL/kg)  775 (8.6)     IV Piggyback  400     Total Intake(mL/kg)  1175 (13.1) 120 (1.3)    Urine (mL/kg/hr)  205     Drains  160     Other  0     Stool  525     Total Output  890     Net  +285 +120           Stool Occurrence  1 x           Physical Exam   Constitutional: He is oriented to person, place, and time. No distress.   Slightly acute and chronically ill   HENT:   Head: Normocephalic.   Neck: Normal range of motion.   Cardiovascular: Normal rate, regular rhythm and normal heart sounds.   Pulmonary/Chest: Effort normal and breath sounds normal.   Abdominal: Soft. Bowel sounds are normal.   Right upper quadrant colostomy,  midline incision that is healed.  Wound VAC to left abdominal wall.  Drain with now seropurulent drainage   Neurological: He is oriented to person, place, and time.   Skin: Skin is warm and dry.   Nursing note and vitals reviewed.      Significant Labs:  CBC:   Recent Labs   Lab 11/16/18 0437   WBC 14.12*   RBC 3.35*   HGB 9.2*   HCT 27.4*   *   MCV 82   MCH 27.5   MCHC 33.6     BMP:   Recent Labs   Lab 11/16/18 0437   *  144*   *  132*   K 4.1  4.1     100   CO2 18*  18*   BUN 72*  72*   CREATININE 8.0*  8.0*   CALCIUM 9.1  9.1     CMP:   Recent Labs   Lab 11/16/18 0437   *  144*   CALCIUM 9.1  9.1   ALBUMIN 2.6*  2.6*   PROT 7.2   *  132*   K 4.1  4.1   CO2 18*  18*     100   BUN 72*  72*   CREATININE 8.0*  8.0*   ALKPHOS 83   ALT 51*   AST 50*   BILITOT 0.9       Significant Diagnostics:  CT: I have reviewed all pertinent results/findings within the past 24 hours and my personal findings are:        EXAMINATION:  CT ABDOMEN PELVIS WITHOUT CONTRAST    CLINICAL HISTORY:  Post operative infection, abdomen pelvis; Peritoneal abscess    TECHNIQUE:  Low dose axial images, sagittal and coronal reformations were obtained from the lung bases to the pubic symphysis.    COMPARISON:  None    FINDINGS:  Heart: Mild cardiomegaly.  Cardiac pacing wires are noted.  Severe coronary artery disease.    Lung Bases: Mild basilar atelectasis and dependent changes.    Liver: Normal size and attenuation. No focal lesions.    Gallbladder: Surgically absent.    Bile Ducts: No dilatation.    Pancreas: No obvious mass. No peripancreatic fat stranding.    Spleen: Normal.    Adrenals: Normal.    Kidneys/Ureters: Mild perinephric stranding.  No mass, hydroureteronephrosis, or nephroureterolithiasis.    Bladder: Collapsed with Guzman catheter in place    Reproductive organs: Unremarkable    GI Tract/Mesentery: Contrast is seen within the esophagus which could reflect reflux  disease.  Mild mucosal thickening is seen at the GE junction.  No evidence of bowel obstruction or inflammation.    Right hemicolectomy noted.  Ostomy is seen within the right lower quadrant.  Postoperative changes are seen within the left lower quadrant with drain extending into the pelvis.  Stranding is seen within the mesentery and omentum.  Rectal contrast was administered which demonstrates sigmoid perforation with a collection of contrast and air within the anterior pelvis.  Kenny-Kohli drain tip projects within the collection.  Collection measures approximately 3 x 2.4 x 5.1 Cm.  Scattered diverticulosis throughout the colon..    Peritoneal Space: No ascites or free air.    Retroperitoneum: No significant adenopathy.    Abdominal wall: Stranding is seen along the left anterior chest wall.  Midline scar is noted.    Vasculature: No aneurysm. Aorta demonstrates atherosclerotic disease.    Bones: No acute fracture.  Moderate degenerative changes are noted within the bones.  No suspicious lytic or sclerotic lesions.      Impression       Rectal contrast was administered which demonstrates sigmoid perforation with a collection of contrast and air within the anterior pelvis.  Kenny-Kohli drain tip projects within the collection.  Collection measures approximately 3 x 2.4 x 5.1 cm.    All CT scans at this facility use dose modulation, iterative reconstruction, and/or weight based dosing when appropriate to reduce radiation dose to as low as reasonable achievable.      Electronically signed by: Rhys Blackmon MD  Date: 11/16/2018  Time: 08:04

## 2018-11-16 NOTE — SUBJECTIVE & OBJECTIVE
Interval History: patient has not complaints. Feels weak. Was told he could eat today.    Review of Systems   Constitutional: Negative for chills, diaphoresis, fatigue and fever.   HENT: Negative for congestion, sore throat and voice change.    Eyes: Negative for photophobia and visual disturbance.   Respiratory: Negative for cough, shortness of breath, wheezing and stridor.    Cardiovascular: Negative for chest pain and leg swelling.   Gastrointestinal: Negative for abdominal distention, abdominal pain, constipation, diarrhea, nausea and vomiting.        S/p abdominal surgery     Endocrine: Negative for polydipsia, polyphagia and polyuria.   Genitourinary: Negative for difficulty urinating, dysuria, flank pain, testicular pain and urgency.   Musculoskeletal: Negative for back pain, joint swelling, neck pain and neck stiffness.   Skin: Positive for wound ( drain with purulant drainage ). Negative for color change and rash.   Allergic/Immunologic: Negative for immunocompromised state.   Neurological: Negative for dizziness, syncope, weakness, numbness and headaches.   Hematological: Does not bruise/bleed easily.   Psychiatric/Behavioral: Negative for agitation, behavioral problems and confusion.      Objective:     Vital Signs (Most Recent):  Temp: 99.1 °F (37.3 °C) (11/16/18 0730)  Pulse: 71 (11/16/18 1257)  Resp: 18 (11/16/18 0730)  BP: (!) 116/57 (11/16/18 0730)  SpO2: 97 % (11/16/18 0730) Vital Signs (24h Range):  Temp:  [97.5 °F (36.4 °C)-99.1 °F (37.3 °C)] 99.1 °F (37.3 °C)  Pulse:  [71-92] 71  Resp:  [16-20] 18  SpO2:  [95 %-99 %] 97 %  BP: ()/(48-57) 116/57     Weight: 89.8 kg (197 lb 15.6 oz)  Body mass index is 30.1 kg/m².    Intake/Output Summary (Last 24 hours) at 11/16/2018 1432  Last data filed at 11/16/2018 1420  Gross per 24 hour   Intake 1295 ml   Output 1130 ml   Net 165 ml      Physical Exam   Constitutional: He is oriented to person, place, and time. He appears well-developed. No distress.    Some muscle wasting, appears dry   HENT:   Head: Normocephalic.   Eyes: EOM are normal. Pupils are equal, round, and reactive to light.   Neck: Normal range of motion. Neck supple. No JVD present. No thyromegaly present.   Cardiovascular: Normal rate, regular rhythm, S1 normal, S2 normal, normal heart sounds and intact distal pulses. PMI is not displaced. Exam reveals no gallop and no friction rub.   No murmur heard.  Pulmonary/Chest: Effort normal and breath sounds normal. No respiratory distress. He has no wheezes. He has no rales. He exhibits no tenderness.   Abdominal: He exhibits no distension and no mass. There is no hepatosplenomegaly. There is no tenderness. There is no rebound and no CVA tenderness. No hernia.    ileostomy in place, wound VAC  LLQ, KINDRA drain with thickened-purulent/serosanguinous.    Genitourinary:   Genitourinary Comments: Guzman in place with kelton urine   Musculoskeletal: Normal range of motion. He exhibits no edema or tenderness.   Lymphadenopathy:     He has no cervical adenopathy.   Neurological: He is alert and oriented to person, place, and time. He has normal reflexes. He is not disoriented. He displays normal reflexes. No cranial nerve deficit. He exhibits normal muscle tone. Coordination normal.   Skin: Skin is warm and dry. Capillary refill takes less than 2 seconds. No rash noted. No erythema.   Psychiatric: He has a normal mood and affect. His behavior is normal. Judgment and thought content normal.   Nursing note and vitals reviewed.    Significant Labs:   CBC:   Recent Labs   Lab 11/15/18  1233 11/16/18  0437   WBC 13.50* 14.12*   HGB 9.9* 9.2*   HCT 29.3* 27.4*   * 365*     CMP:   Recent Labs   Lab 11/15/18  1233 11/16/18  0437   * 132*  132*   K 4.3 4.1  4.1   CL 98 100  100   CO2 20* 18*  18*   * 144*  144*   BUN 70* 72*  72*   CREATININE 8.2* 8.0*  8.0*   CALCIUM 9.2 9.1  9.1   PROT  --  7.2   ALBUMIN  --  2.6*  2.6*   BILITOT  --  0.9    ALKPHOS  --  83   AST  --  50*   ALT  --  51*   ANIONGAP 13 14  14   EGFRNONAA 6* 6*  6*       Significant Imaging:   X-Ray Chest 1 View [[id number]] Resulted: 11/16/18 0805   Order Status: Completed Updated: 11/16/18 0807   Narrative:     EXAMINATION:  XR CHEST 1 VIEW    CLINICAL HISTORY:  sob;    FINDINGS:  Single view of the chest.  Aorta demonstrates atherosclerotic disease.  Left-sided pacing device noted.    Cardiac silhouette is normal.  The lungs demonstrate no evidence of active disease.  No evidence of pleural effusion or pneumothorax.  Bones demonstrate degenerative changes.   Impression:       No acute process seen.      Electronically signed by: Rhys Blackmon MD  Date: 11/16/2018

## 2018-11-16 NOTE — ASSESSMENT & PLAN NOTE
Patient has history of congestive heart failure managed on Bumex and Zaroxolyn.  At this point patient has weight loss of 7 lb in the last 1 week.   Patient given IV fluids overnight.  No improvement in acute kidney injury and urine output has not improved as well.  For now we will withhold diuretics.        Case discussed in detail with surgery and Cardiology.  Patient is very close to needing dialysis.  May need also dobutamine drip.  Low threshold to transfer to the ICU.    Continue IV fluids for now.  Continue with lisinopril for now as well.  Holding diuretics for now.

## 2018-11-16 NOTE — HOSPITAL COURSE
11/16/18 creatinine still 8. Nephrology and Cardiology assisting with management. Renal ultrasound pending. No surgical intervention indicated at present. As of 11/17 creatinine has improved from 8.0 to 7.2. No HD to be performed at this time. Renal US showed minimal increased cortical echogenicity right kidney. Hypotensive episodes overnight and systolic now 97. Pt currently taking Coreg 25 mg PO daily. Will split the dose of Coreg to 12.5 mg PO BID with holding parameters. Blood sugars controlled. As of 11/18 pt was transferred to Telemetry last night per Cardiology recommendations. Creatinine improving, currently 5.5 from 7.2 with gentle IV hydration. Leukocytosis resolved. Remains afebrile. Hx of CHF, appears compensated at this time. DM controlled. As of 11/19 creatinine continues to improve with IV hydration, currently 3.4 from 5.5. BP also improving. Pt still having moderate amt of ileostomy outpt. Per General surgery, needs to have < 1L a day of ileostomy output before discharge. As of 11/20 creatinine continues to improve, currently 2.3 from 3.4. Glucose stable. VSS. Ileostomy output was formed yesterday but upon examination today, more liquid consistency. Pt feeling better.

## 2018-11-16 NOTE — SUBJECTIVE & OBJECTIVE
Past Medical History:   Diagnosis Date    Arthritis     CAD (coronary artery disease)     Cataract     CHF (congestive heart failure)     Dr Calvillo    Chronic combined systolic and diastolic congestive heart failure 3/24/2015    CKD (chronic kidney disease), stage III     Diabetes mellitus type II      AM    Diabetic retinopathy     anti Veg F injections for macular edema    Diverticulitis of colon     ED (erectile dysfunction)     Glaucoma     HTN (hypertension)     Hyperlipidemia     Ischemic cardiomyopathy     Obesity     JAHAIRA on CPAP     Pacemaker     Pulmonary HTN        Past Surgical History:   Procedure Laterality Date    CARDIAC CATHETERIZATION  2009    CHOLECYSTECTOMY      COLECTOMY-SIGMOID N/A 4/22/2016    Performed by Kevin Lindsay MD at [facility] OR    COLONOSCOPY N/A 10/11/2018    Procedure: COLONOSCOPY;  Surgeon: Quinn Aragon MD;  Location: [facility] ENDO;  Service: General;  Laterality: N/A;    COLONOSCOPY N/A 10/11/2018    Performed by Quinn Aragon MD at [facility] ENDO    COLOSTOMY N/A 4/22/2016    Performed by Kevin [facility] at [facility] OR    CREATION, ILEOSTOMY N/A 10/30/2018    Performed by [facility] MD at [facility] OR    EYE SURGERY      HEMICOLECTOMY, RIGHT Right 10/30/2018    Performed by [facility] at [facility] OR    ILEOSTOMY N/A 10/30/2018    Procedure: CREATION, ILEOSTOMY;  Surgeon: Kevin Lindsay MD;  Location: [facility] OR;  Service: General;  Laterality: N/A;    KNEE SURGERY      MOBILIZATION OF SPLENIC FLEXURE N/A 10/30/2018    Procedure: MOBILIZATION, SPLENIC FLEXURE;  Surgeon: Kevin Lindsay MD;  Location: [facility] OR;  Service: General;  Laterality: N/A;    MOBILIZATION, SPLENIC FLEXURE N/A 10/30/2018    Performed by Kevin Lindsay MD at [facility] OR    REVISION COLOSTOMY N/A 10/30/2018    Procedure: REVISION OR CLOSURE, COLOSTOMY;  Surgeon: Kevin Lindsay MD;  Location: [facility] OR;  Service: General;  Laterality: N/A;  Parastomal Hernia  Repair    REVISION OR CLOSURE, COLOSTOMY N/A 10/30/2018    Performed by Kevin [facility] MD at [facility] OR    RIGHT HEMICOLECTOMY Right 10/30/2018    Procedure: HEMICOLECTOMY, RIGHT;  Surgeon: Kevin Lindsay MD;  Location: [facility] OR;  Service: General;  Laterality: Right;       Review of patient's allergies indicates:  No Known Allergies    No current facility-administered medications on file prior to encounter.      Current Outpatient Medications on File Prior to Encounter   Medication Sig    allopurinol (ZYLOPRIM) 100 MG tablet TAKE 1 TABLET (100 MG TOTAL) BY MOUTH ONCE DAILY. (Patient taking differently: TAKE 1 TABLET (100 MG TOTAL) BY MOUTH ONCE DAILY./ takes in AM)    aspirin (ECOTRIN) 81 MG EC tablet Take 81 mg by mouth every morning.     BD INSULIN SYRINGE ULTRA-FINE 0.5 mL 31 gauge x 5/16 Syrg USE AS DIRECTED TO INJECT INSULIN TWO TIMES DAILY    bimatoprost (LUMIGAN) 0.03 % ophthalmic drops Place 1 drop into the left eye every evening. (Patient taking differently: Place 1 drop into the right eye every evening. )    blood sugar diagnostic Strp 1 strip by Misc.(Non-Drug; Combo Route) route 4 (four) times daily. [facility]    bumetanide (BUMEX) 2 MG tablet Take 1 tablet (2 mg total) by mouth 2 (two) times daily. (Patient taking differently: Take 2 mg by mouth every morning. )    carvedilol (COREG) 25 MG tablet TAKE 1/2 TABLET BY MOUTH TWICE A DAY WITH MEALS    cholecalciferol, vitamin D3, (VITAMIN D3) 1,000 unit capsule Take 1,000 Units by mouth once daily.    ciprofloxacin HCl (CIPRO) 500 MG tablet Take 1 tablet (500 mg total) by mouth every 12 (twelve) hours. for 14 days    diphenoxylate-atropine 2.5-0.025 mg (LOMOTIL) 2.5-0.025 mg per tablet Take 1 tablet by mouth 4 (four) times daily. for 10 days    HYDROcodone-acetaminophen (NORCO) 5-325 mg per tablet One or 2 every 4-6 hours as needed for pain    insulin glargine (LANTUS) 100 unit/mL injection 50 units bid prn when BS< 150    IRON/VITAMIN B  "COMPLEX (GERITOL ORAL) Take by mouth.    lancets Misc For tel care    lisinopril (PRINIVIL,ZESTRIL) 5 MG tablet TAKE 1 TABLET (5 MG TOTAL) BY MOUTH ONCE DAILY. (Patient taking differently: Take 5 mg by mouth every morning. )    metOLazone (ZAROXOLYN) 2.5 MG tablet Take 1 tablet (2.5 mg total) by mouth every Mon, Wed, Fri.    metroNIDAZOLE (FLAGYL) 500 MG tablet Take 1 tablet (500 mg total) by mouth every 12 (twelve) hours. The night before surgery for 14 doses    pantoprazole (PROTONIX) 40 MG tablet TAKE 1 TABLET BY MOUTH EVERY DAY FOR ACID REFLUX    pen needle, diabetic (BD ULTRA-FINE JOSLYN PEN NEEDLE) 32 gauge x 5/32" Ndle 1 each by Misc.(Non-Drug; Combo Route) route 4 (four) times daily.    potassium chloride SA (K-DUR,KLOR-CON) 10 MEQ tablet TAKE 1 TABLET BY MOUTH EVERY DAY (Patient not taking: Reported on 11/15/2018)    psyllium husk, aspartame, (METAMUCIL) 3.4 gram PwPk packet Take 1 packet by mouth once daily.    simvastatin (ZOCOR) 10 MG tablet TAKE 1 TABLET (10 MG TOTAL) BY MOUTH EVERY EVENING.     Family History     Problem Relation (Age of Onset)    Cancer Mother    Heart disease Father    No Known Problems Sister, Brother, Maternal Aunt, Maternal Uncle, Paternal Aunt, Paternal Uncle, Maternal Grandmother, Maternal Grandfather, Paternal Grandmother, Paternal Grandfather    Stroke Father        Tobacco Use    Smoking status: Never Smoker    Smokeless tobacco: Never Used   Substance and Sexual Activity    Alcohol use: Yes     Alcohol/week: 0.0 oz     Comment: NO ALCOHOL 72 HOURS PRIOR TO SX    Drug use: No    Sexual activity: Not Currently     Review of Systems   Constitutional: Negative for chills, diaphoresis, fatigue and fever.   HENT: Negative for congestion, sore throat and voice change.    Eyes: Negative for photophobia and visual disturbance.   Respiratory: Negative for cough, shortness of breath, wheezing and stridor.    Cardiovascular: Negative for chest pain and leg swelling. "   Gastrointestinal: Negative for abdominal distention, abdominal pain, constipation, diarrhea, nausea and vomiting.        Positive s/p General Surgery procedure    Endocrine: Negative for polydipsia, polyphagia and polyuria.   Genitourinary: Negative for difficulty urinating, dysuria, flank pain, testicular pain and urgency.   Musculoskeletal: Negative for back pain, joint swelling, neck pain and neck stiffness.   Skin: Positive for wound ( drain with purulant drainage ). Negative for color change and rash.   Allergic/Immunologic: Negative for immunocompromised state.   Neurological: Negative for dizziness, syncope, weakness, numbness and headaches.   Hematological: Does not bruise/bleed easily.   Psychiatric/Behavioral: Negative for agitation, behavioral problems and confusion.     Objective:     Vital Signs (Most Recent):  Temp: 98.2 °F (36.8 °C) (11/15/18 2307)  Pulse: 73 (11/16/18 0320)  Resp: 18 (11/15/18 2307)  BP: (!) 90/55 (11/15/18 2307)  SpO2: 99 % (11/15/18 2307) Vital Signs (24h Range):  Temp:  [97.5 °F (36.4 °C)-98.8 °F (37.1 °C)] 98.2 °F (36.8 °C)  Pulse:  [73-92] 73  Resp:  [16-20] 18  SpO2:  [95 %-99 %] 99 %  BP: (81-90)/(48-56) 90/55     Weight: 89.8 kg (197 lb 15.6 oz)  Body mass index is 30.1 kg/m².    Physical Exam   Constitutional: He is oriented to person, place, and time. He appears well-developed. He has a sickly appearance. No distress.   elderly   HENT:   Head: Normocephalic and atraumatic.   Nose: Nose normal.   Eyes: Conjunctivae and EOM are normal. Pupils are equal, round, and reactive to light. No scleral icterus.   Neck: Normal range of motion. Neck supple. No tracheal deviation present.   Cardiovascular: Normal rate, regular rhythm, normal heart sounds and intact distal pulses.   No murmur heard.  Pulmonary/Chest: Effort normal and breath sounds normal. No stridor. No respiratory distress. He has no wheezes. He has no rales.   Abdominal: Soft. Bowel sounds are normal. He exhibits no  distension. There is no tenderness. There is no guarding.   LLQ KINDRA drain with milky discharge.   ileostomy in the right upper quadrant with collection device.   There is healing midline incision WNL .    LLQ wound VAC dressing noted, c/d/i.    obese   Genitourinary:   Genitourinary Comments: Check ua     Musculoskeletal: Normal range of motion. He exhibits no edema or deformity.   Neurological: He is alert and oriented to person, place, and time. No cranial nerve deficit.   Skin: Skin is warm and dry. Capillary refill takes 2 to 3 seconds. No rash noted. He is not diaphoretic.   Psychiatric: He has a normal mood and affect. His behavior is normal. Judgment and thought content normal.   Nursing note and vitals reviewed.        CRANIAL NERVES     CN III, IV, VI   Pupils are equal, round, and reactive to light.  Extraocular motions are normal.        Significant Labs:   CBC:   Recent Labs   Lab 11/15/18  1233   WBC 13.50*   HGB 9.9*   HCT 29.3*   *     CMP:   Recent Labs   Lab 11/15/18  1233   *   K 4.3   CL 98   CO2 20*   *   BUN 70*   CREATININE 8.2*   CALCIUM 9.2   ANIONGAP 13   EGFRNONAA 6*     Results for orders placed or performed during the hospital encounter of 11/15/18   Procalcitonin   Result Value Ref Range    Procalcitonin 0.47 (H) <0.25 ng/mL   Creatinine, urine, random   Result Value Ref Range    Creatinine, Random Ur >450.0 (H) 23.0 - 375.0 mg/dL   Sodium, urine, random   Result Value Ref Range    Sodium Urine Random <20 (A) 20 - 250 mmol/L   Protein / creatinine ratio, urine   Result Value Ref Range    Protein, Urine Random 38 (H) 0 - 15 mg/dL    Creatinine, Random Ur >450.0 (H) 23.0 - 375.0 mg/dL    Prot/Creat Ratio, Ur Unable to calculate 0.00 - 0.20   Urinalysis, Reflex to Urine Culture Urine, Clean Catch   Result Value Ref Range    Specimen UA Urine, Catheterized     Color, UA Yellow Yellow, Straw, Polina    Appearance, UA Clear Clear    pH, UA 5.0 5.0 - 8.0    Specific Gravity,  UA >=1.030 (A) 1.005 - 1.030    Protein, UA Negative Negative    Glucose, UA Negative Negative    Ketones, UA Trace (A) Negative    Bilirubin (UA) 1+ (A) Negative    Occult Blood UA Trace (A) Negative    Nitrite, UA Negative Negative    Urobilinogen, UA Negative <2.0 EU/dL    Leukocytes, UA Negative Negative   Uric acid   Result Value Ref Range    Uric Acid 16.4 (H) 3.4 - 7.0 mg/dL   Brain natriuretic peptide   Result Value Ref Range     (H) 0 - 99 pg/mL   Lactic acid, plasma   Result Value Ref Range    Lactate (Lactic Acid) 0.9 0.5 - 2.2 mmol/L   C-reactive protein   Result Value Ref Range    CRP 67.3 (H) 0.0 - 8.2 mg/L   Sedimentation rate   Result Value Ref Range    Sed Rate 112 (H) 0 - 10 mm/Hr   POCT glucose   Result Value Ref Range    POCT Glucose 141 (H) 70 - 110 mg/dL         All pertinent labs within the past 24 hours have been reviewed.    Significant Imaging:  Pertinent radiology results in past 24 hours have been reviewed.     X-Ray Chest 1 View   Order: [id number]   Status:  Final result   Visible to patient:  No (Not Released) Next appt:  11/20/2018 at 07:20 AM in Lab ([facility])   Details     Reading Physician Reading Date Result Priority   Len Mercedes MD 11/15/2018       Narrative     EXAMINATION:  XR CHEST 1 VIEW    CLINICAL HISTORY:  sob;  shortness of breath.    COMPARISON:  11/04/2016.    FINDINGS:  Lungs are clear.  Cardiac silhouette mildly enlarged.  Left-sided pacemaker/AICD in place, unchanged since the previous exam.  Mild prominence of the pulmonary vasculature could represent mild pulmonary vascular congestion.  Previously identified right internal article on has been removed.No significant bony findings.      Impression       1. Mildly prominent pulmonary vasculature, which could indicate mild pulmonary vascular congestion.  2. Interval removal of right internal jugular line.      Electronically signed by: Len Mercedes MD  Date: 11/15/2018  Time: 18:42

## 2018-11-16 NOTE — PROGRESS NOTES
[facility] -   Nephrology  Progress Note    Patient Name: Yan Chouduhry  MRN: [id number]  Admission Date: 11/15/2018  Hospital Length of Stay: 1 days  Attending Provider: Kevin Lindsay MD   Primary Care Physician: Sandra Estevez MD  Principal Problem:Open wound of anterior abdominal wall with complication    Subjective:     HPI: Patient is a 63-year-old male with history of chronic kidney disease followed by our division in the outpatient clinic.  Patient has been seen by Dr. Seth and Dr. Juan Luis Galeas in the past.  Patient has had history of diverticular perforation requiring colostomy.  Patient is status post colostomy reversal as well as repair of paraostomy hernia repair.  Patient was recently hospitalized with dehydration.  Patient also has history of congestive heart failure followed by Dr. Calvillo as an outpatient.  Patient takes metolazone as an outpatient for diuretics.  Patient was seen by Dr. Lindsay as an outpatient today and was admitted for dehydration.  Patient's baseline creatinine ranged between 1.8 and 2.1.  Patient never had any proteinuria.  His creatinine has gone up to 8.2 with a BUN elevation as well as hyponatremia.  Nephrology consultation is requested for acute kidney injury on chronic kidney disease stage 3 4/stage IV.    Interval History:  No improvement in acute kidney injury after IV fluids.  No acute CHF today.  Continue with IV fluids per recommendations of Cardiology.  Case discussed in detail with Cardiology and surgery.    Review of patient's allergies indicates:  No Known Allergies  Current Facility-Administered Medications   Medication Frequency    0.9%  NaCl infusion Continuous    allopurinol tablet 100 mg Daily    carvedilol tablet 25 mg Daily    ciprofloxacin (CIPRO)400mg/200ml D5W IVPB 400 mg Q12H    dextrose 50% injection 12.5 g PRN    glucagon (human recombinant) injection 1 mg PRN    insulin aspart U-100 pen 1-10 Units Q6H PRN    lidocaine (PF) 10  mg/ml (1%) injection 10 mg Once    lisinopril tablet 5 mg Daily    metronidazole IVPB 500 mg Q8H    ondansetron injection 4 mg Q8H PRN    pantoprazole EC tablet 40 mg Daily    psyllium husk packet 6 g Daily    simvastatin tablet 10 mg QHS       Objective:     Vital Signs (Most Recent):  Temp: 99.1 °F (37.3 °C) (11/16/18 0730)  Pulse: 71 (11/16/18 1100)  Resp: 18 (11/16/18 0730)  BP: (!) 116/57 (11/16/18 0730)  SpO2: 97 % (11/16/18 0730)  O2 Device (Oxygen Therapy): room air (11/15/18 2106) Vital Signs (24h Range):  Temp:  [97.5 °F (36.4 °C)-99.1 °F (37.3 °C)] 99.1 °F (37.3 °C)  Pulse:  [71-92] 71  Resp:  [16-20] 18  SpO2:  [95 %-99 %] 97 %  BP: ()/(48-57) 116/57     Weight: 89.8 kg (197 lb 15.6 oz) (11/15/18 2106)  Body mass index is 30.1 kg/m².  Body surface area is 2.08 meters squared.    I/O last 3 completed shifts:  In: 1175 [I.V.:775; IV Piggyback:400]  Out: 890 [Urine:205; Drains:160; Stool:525]    Physical Exam   Constitutional: He is oriented to person, place, and time. He appears well-developed. No distress.   Some muscle wasting, signs of dehydration, malnutrition   HENT:   Head: Normocephalic.   Eyes: EOM are normal. Pupils are equal, round, and reactive to light.   Neck: Normal range of motion. Neck supple. No JVD present. No thyromegaly present.   Cardiovascular: Normal rate, regular rhythm, S1 normal, S2 normal, normal heart sounds and intact distal pulses. PMI is not displaced. Exam reveals no gallop and no friction rub.   No murmur heard.  No clinical evidence of acute CHF at this time.   Pulmonary/Chest: Effort normal and breath sounds normal. No respiratory distress. He has no wheezes. He has no rales. He exhibits no tenderness.   Abdominal: He exhibits no distension and no mass. There is no hepatosplenomegaly. There is no tenderness. There is no rebound and no CVA tenderness. No hernia.    ileostomy in place, wound VAC on the left lower quadrant   Musculoskeletal: Normal range of  motion. He exhibits no edema or tenderness.   Lymphadenopathy:     He has no cervical adenopathy.   Neurological: He is alert and oriented to person, place, and time. He has normal reflexes. He is not disoriented. He displays normal reflexes. No cranial nerve deficit. He exhibits normal muscle tone. Coordination normal.   Skin: Skin is warm and dry. Capillary refill takes less than 2 seconds. No rash noted. No erythema.   Psychiatric: He has a normal mood and affect. His behavior is normal. Judgment and thought content normal.   Nursing note and vitals reviewed.      Significant Labs:  All labs within the past 24 hours have been reviewed.     Significant Imaging:  Labs: Reviewed  X-Ray: Reviewed  Echo: Reviewed    Assessment/Plan:     Acute on chronic renal failure    Patient has history of congestive heart failure managed on Bumex and Zaroxolyn.  At this point patient has weight loss of 7 lb in the last 1 week.   Patient given IV fluids overnight.  No improvement in acute kidney injury and urine output has not improved as well.  For now we will withhold diuretics.        Case discussed in detail with surgery and Cardiology.  Patient is very close to needing dialysis.  May need also dobutamine drip.  Low threshold to transfer to the ICU.    Continue IV fluids for now.  Continue with lisinopril for now as well.  Holding diuretics for now.         Thank you for your consult.     Sandoval Damico MD  Nephrology  [facility] -

## 2018-11-16 NOTE — PROGRESS NOTES
[facility] Hospital Medicine  Progress Note    Patient Name: Yan Choudhury  MRN: [id number]  Patient Class: IP- Inpatient   Admission Date: 11/15/2018  Length of Stay: 1 days  Attending Physician: Kevin Lindsay MD  Primary Care Provider: Sandra Estevez MD    Subjective:     Principal Problem:Open wound of anterior abdominal wall with complication    HPI:  Patient is a 63 y.o. male presents after a colostomy reversal, repair of parastomal hernia and protective ileostomy with complaint of purulent material from the drain. Patient was direct admitted by General Surgery. Renal was consulted for Acute on chronic kidney injury. Renal Note reviewed and plans for gentle hydration overnight. HM consulted for assistance with medical management. Patient seen and examined in his room. Patient with no current complaints other than purulent drain discharge. No modifying factors, no associated symptoms. Disucssed case with Dr. Lindsay patient is to receive both flagyl and cipro and CT ABD pending. Significant medical conditions include CAD, CHF, HTN, DM2.          Hospital Course:  11/16/18 creatinine still 8. Nephrology and Cardiology assisting with management. Renal ultrasound pending. No surgical intervention indicated at present.    Interval History: patient has not complaints. Feels weak. Was told he could eat today.    Review of Systems   Constitutional: Negative for chills, diaphoresis, fatigue and fever.   HENT: Negative for congestion, sore throat and voice change.    Eyes: Negative for photophobia and visual disturbance.   Respiratory: Negative for cough, shortness of breath, wheezing and stridor.    Cardiovascular: Negative for chest pain and leg swelling.   Gastrointestinal: Negative for abdominal distention, abdominal pain, constipation, diarrhea, nausea and vomiting.        S/p abdominal surgery     Endocrine: Negative for polydipsia, polyphagia and polyuria.   Genitourinary: Negative for difficulty  urinating, dysuria, flank pain, testicular pain and urgency.   Musculoskeletal: Negative for back pain, joint swelling, neck pain and neck stiffness.   Skin: Positive for wound ( drain with purulant drainage ). Negative for color change and rash.   Allergic/Immunologic: Negative for immunocompromised state.   Neurological: Negative for dizziness, syncope, weakness, numbness and headaches.   Hematological: Does not bruise/bleed easily.   Psychiatric/Behavioral: Negative for agitation, behavioral problems and confusion.      Objective:     Vital Signs (Most Recent):  Temp: 99.1 °F (37.3 °C) (11/16/18 0730)  Pulse: 71 (11/16/18 1257)  Resp: 18 (11/16/18 0730)  BP: (!) 116/57 (11/16/18 0730)  SpO2: 97 % (11/16/18 0730) Vital Signs (24h Range):  Temp:  [97.5 °F (36.4 °C)-99.1 °F (37.3 °C)] 99.1 °F (37.3 °C)  Pulse:  [71-92] 71  Resp:  [16-20] 18  SpO2:  [95 %-99 %] 97 %  BP: ()/(48-57) 116/57     Weight: 89.8 kg (197 lb 15.6 oz)  Body mass index is 30.1 kg/m².    Intake/Output Summary (Last 24 hours) at 11/16/2018 1432  Last data filed at 11/16/2018 1420  Gross per 24 hour   Intake 1295 ml   Output 1130 ml   Net 165 ml      Physical Exam   Constitutional: He is oriented to person, place, and time. He appears well-developed. No distress.   Some muscle wasting, appears dry   HENT:   Head: Normocephalic.   Eyes: EOM are normal. Pupils are equal, round, and reactive to light.   Neck: Normal range of motion. Neck supple. No JVD present. No thyromegaly present.   Cardiovascular: Normal rate, regular rhythm, S1 normal, S2 normal, normal heart sounds and intact distal pulses. PMI is not displaced. Exam reveals no gallop and no friction rub.   No murmur heard.  Pulmonary/Chest: Effort normal and breath sounds normal. No respiratory distress. He has no wheezes. He has no rales. He exhibits no tenderness.   Abdominal: He exhibits no distension and no mass. There is no hepatosplenomegaly. There is no tenderness. There is no  rebound and no CVA tenderness. No hernia.    ileostomy in place, wound VAC  LLQ, KINDRA drain with thickened-purulent/serosanguinous.    Genitourinary:   Genitourinary Comments: Guzman in place with kelton urine   Musculoskeletal: Normal range of motion. He exhibits no edema or tenderness.   Lymphadenopathy:     He has no cervical adenopathy.   Neurological: He is alert and oriented to person, place, and time. He has normal reflexes. He is not disoriented. He displays normal reflexes. No cranial nerve deficit. He exhibits normal muscle tone. Coordination normal.   Skin: Skin is warm and dry. Capillary refill takes less than 2 seconds. No rash noted. No erythema.   Psychiatric: He has a normal mood and affect. His behavior is normal. Judgment and thought content normal.   Nursing note and vitals reviewed.    Significant Labs:   CBC:   Recent Labs   Lab 11/15/18  1233 11/16/18  0437   WBC 13.50* 14.12*   HGB 9.9* 9.2*   HCT 29.3* 27.4*   * 365*     CMP:   Recent Labs   Lab 11/15/18  1233 11/16/18  0437   * 132*  132*   K 4.3 4.1  4.1   CL 98 100  100   CO2 20* 18*  18*   * 144*  144*   BUN 70* 72*  72*   CREATININE 8.2* 8.0*  8.0*   CALCIUM 9.2 9.1  9.1   PROT  --  7.2   ALBUMIN  --  2.6*  2.6*   BILITOT  --  0.9   ALKPHOS  --  83   AST  --  50*   ALT  --  51*   ANIONGAP 13 14  14   EGFRNONAA 6* 6*  6*       Significant Imaging:   X-Ray Chest 1 View [[id number]] Resulted: 11/16/18 0805   Order Status: Completed Updated: 11/16/18 0807   Narrative:     EXAMINATION:  XR CHEST 1 VIEW    CLINICAL HISTORY:  sob;    FINDINGS:  Single view of the chest.  Aorta demonstrates atherosclerotic disease.  Left-sided pacing device noted.    Cardiac silhouette is normal.  The lungs demonstrate no evidence of active disease.  No evidence of pleural effusion or pneumothorax.  Bones demonstrate degenerative changes.   Impression:       No acute process seen.      Electronically signed by: Rhys Blackmon MD  Date:  11/16/2018         Assessment/Plan:      * Open wound of anterior abdominal wall with complication    -General surgery managing  -IV antibiotics  -no indication for further surgical intervention for now           Gastrointestinal anastomotic leak    -General Surgery managing  -KINDRA drain in place       Acute on chronic renal failure    -Creatine currently 8 with metabolic acidosis. Renal ultrasound pending.   -Nephrology following; urine studies abnormal and will be managed by Nephrology team  -continue gentle hydration       Chronic combined systolic and diastolic congestive heart failure    -compensated  -Monitor hydration status  Continue bb, ace/arb, statin,   Asa antiplatelet/anticoag hold as may need surgical intervetion       Type 2 diabetes mellitus with stage 4 chronic kidney disease, with long-term current use of insulin    Check a1c  ssi  montior        Ischemic cardiomyopathy    -Cardiology following;   -He is not volume overloaded  -Continue cardiac medications     Hypertension associated with diabetes    BP and glucose stable       VTE Risk Mitigation (From admission, onward)        Ordered     Place sequential compression device  Until discontinued      11/15/18 2006     IP VTE HIGH RISK PATIENT  Once      11/15/18 2006              Lazarus Rodriguez NP  Department of Hospital Medicine   [facility]

## 2018-11-16 NOTE — SUBJECTIVE & OBJECTIVE
Interval History:  No improvement in acute kidney injury after IV fluids.  No acute CHF today.  Continue with IV fluids per recommendations of Cardiology.  Case discussed in detail with Cardiology and surgery.    Review of patient's allergies indicates:  No Known Allergies  Current Facility-Administered Medications   Medication Frequency    0.9%  NaCl infusion Continuous    allopurinol tablet 100 mg Daily    carvedilol tablet 25 mg Daily    ciprofloxacin (CIPRO)400mg/200ml D5W IVPB 400 mg Q12H    dextrose 50% injection 12.5 g PRN    glucagon (human recombinant) injection 1 mg PRN    insulin aspart U-100 pen 1-10 Units Q6H PRN    lidocaine (PF) 10 mg/ml (1%) injection 10 mg Once    lisinopril tablet 5 mg Daily    metronidazole IVPB 500 mg Q8H    ondansetron injection 4 mg Q8H PRN    pantoprazole EC tablet 40 mg Daily    psyllium husk packet 6 g Daily    simvastatin tablet 10 mg QHS       Objective:     Vital Signs (Most Recent):  Temp: 99.1 °F (37.3 °C) (11/16/18 0730)  Pulse: 71 (11/16/18 1100)  Resp: 18 (11/16/18 0730)  BP: (!) 116/57 (11/16/18 0730)  SpO2: 97 % (11/16/18 0730)  O2 Device (Oxygen Therapy): room air (11/15/18 2106) Vital Signs (24h Range):  Temp:  [97.5 °F (36.4 °C)-99.1 °F (37.3 °C)] 99.1 °F (37.3 °C)  Pulse:  [71-92] 71  Resp:  [16-20] 18  SpO2:  [95 %-99 %] 97 %  BP: ()/(48-57) 116/57     Weight: 89.8 kg (197 lb 15.6 oz) (11/15/18 2106)  Body mass index is 30.1 kg/m².  Body surface area is 2.08 meters squared.    I/O last 3 completed shifts:  In: 1175 [I.V.:775; IV Piggyback:400]  Out: 890 [Urine:205; Drains:160; Stool:525]    Physical Exam   Constitutional: He is oriented to person, place, and time. He appears well-developed. No distress.   Some muscle wasting, signs of dehydration, malnutrition   HENT:   Head: Normocephalic.   Eyes: EOM are normal. Pupils are equal, round, and reactive to light.   Neck: Normal range of motion. Neck supple. No JVD present. No thyromegaly  present.   Cardiovascular: Normal rate, regular rhythm, S1 normal, S2 normal, normal heart sounds and intact distal pulses. PMI is not displaced. Exam reveals no gallop and no friction rub.   No murmur heard.  No clinical evidence of acute CHF at this time.   Pulmonary/Chest: Effort normal and breath sounds normal. No respiratory distress. He has no wheezes. He has no rales. He exhibits no tenderness.   Abdominal: He exhibits no distension and no mass. There is no hepatosplenomegaly. There is no tenderness. There is no rebound and no CVA tenderness. No hernia.    ileostomy in place, wound VAC on the left lower quadrant   Musculoskeletal: Normal range of motion. He exhibits no edema or tenderness.   Lymphadenopathy:     He has no cervical adenopathy.   Neurological: He is alert and oriented to person, place, and time. He has normal reflexes. He is not disoriented. He displays normal reflexes. No cranial nerve deficit. He exhibits normal muscle tone. Coordination normal.   Skin: Skin is warm and dry. Capillary refill takes less than 2 seconds. No rash noted. No erythema.   Psychiatric: He has a normal mood and affect. His behavior is normal. Judgment and thought content normal.   Nursing note and vitals reviewed.      Significant Labs:  All labs within the past 24 hours have been reviewed.     Significant Imaging:  Labs: Reviewed  X-Ray: Reviewed  Echo: Reviewed

## 2018-11-16 NOTE — PLAN OF CARE
Considering the severity of his previous history of congestive heart failure.  We have checked his BNP which is low at this time.  Chest x-ray seems to be stable.  I feel comfortable continuation of IV fluids is wanted at this time.  Will try to perform bedside ultrasound in the morning to evaluate the volume status after gentle hydration tonight.  Patient is clinically stable and not in congestive heart failure at the moment

## 2018-11-16 NOTE — ASSESSMENT & PLAN NOTE
Monitor hydration status  Continue bb, ace/arb, statin,   Asa antiplatelet/anticoag hold as may need surgical intervetion  Consider repeat echo  Optimization pending course

## 2018-11-16 NOTE — ASSESSMENT & PLAN NOTE
-compensated  -Monitor hydration status  Continue bb, ace/arb, statin,   Asa antiplatelet/anticoag hold as may need surgical intervetion

## 2018-11-16 NOTE — CONSULTS
[facility] Hospital Medicine  Consult Note    Patient Name: Yan Choudhury  MRN: [id number]  Admission Date: 11/15/2018  Hospital Length of Stay: 1 days  Attending Physician: Kevin Lindsay MD   Primary Care Provider: Sandra Estevez MD           Patient information was obtained from patient, past medical records and ER records.     Consults  Subjective:     Principal Problem: Open wound of anterior abdominal wall with complication    Chief Complaint: Consult for medical management    HPI: Patient is a 63 y.o. male presents after a colostomy reversal, repair of parastomal hernia and protective ileostomy with complaint of purulent material from the drain. Patient was direct admitted by General Surgery. Renal was consulted for Acute on chronic kidney injury. Renal Note reviewed and plans for gentle hydration overnight. HM consulted for assistance with medical management. Patient seen and examined in his room. Patient with no current complaints other than purulent drain discharge. No modifying factors, no associated symptoms. Disucssed case with Dr. Lindsay patient is to receive both flagyl and cipro and CT ABD pending. Significant medical conditions include CAD, CHF, HTN, DM2.          Past Medical History:   Diagnosis Date    Arthritis     CAD (coronary artery disease)     Cataract     CHF (congestive heart failure)     Dr Calvillo    Chronic combined systolic and diastolic congestive heart failure 3/24/2015    CKD (chronic kidney disease), stage III     Diabetes mellitus type II      AM    Diabetic retinopathy     anti Veg F injections for macular edema    Diverticulitis of colon     ED (erectile dysfunction)     Glaucoma     HTN (hypertension)     Hyperlipidemia     Ischemic cardiomyopathy     Obesity     JAHAIRA on CPAP     Pacemaker     Pulmonary HTN        Past Surgical History:   Procedure Laterality Date    CARDIAC CATHETERIZATION  2009    CHOLECYSTECTOMY       COLECTOMY-SIGMOID N/A 4/22/2016    Performed by Kevin Lindsay MD at [facility] OR    COLONOSCOPY N/A 10/11/2018    Procedure: COLONOSCOPY;  Surgeon: Quinn Aragon MD;  Location: [facility] ENDO;  Service: General;  Laterality: N/A;    COLONOSCOPY N/A 10/11/2018    Performed by Quinn Aragon MD at [facility] ENDO    COLOSTOMY N/A 4/22/2016    Performed by Kevin Lindsay MD at [facility] OR    CREATION, ILEOSTOMY N/A 10/30/2018    Performed by Kevin Lindsay MD at [facility] OR    EYE SURGERY      HEMICOLECTOMY, RIGHT Right 10/30/2018    Performed by Kevin Lindsay MD at [facility] OR    ILEOSTOMY N/A 10/30/2018    Procedure: CREATION, ILEOSTOMY;  Surgeon: Kevin Lindsay MD;  Location: [facility] OR;  Service: General;  Laterality: N/A;    KNEE SURGERY      MOBILIZATION OF SPLENIC FLEXURE N/A 10/30/2018    Procedure: MOBILIZATION, SPLENIC FLEXURE;  Surgeon: Kevin Lindsay MD;  Location: [facility] OR;  Service: General;  Laterality: N/A;    MOBILIZATION, SPLENIC FLEXURE N/A 10/30/2018    Performed by Kevin Lindsay MD at [facility] OR    REVISION COLOSTOMY N/A 10/30/2018    Procedure: REVISION OR CLOSURE, COLOSTOMY;  Surgeon: Kevin Lindsay MD;  Location: [facility] OR;  Service: General;  Laterality: N/A;  Parastomal Hernia Repair    REVISION OR CLOSURE, COLOSTOMY N/A 10/30/2018    Performed by Kevin Lindsay MD at [facility] OR    RIGHT HEMICOLECTOMY Right 10/30/2018    Procedure: HEMICOLECTOMY, RIGHT;  Surgeon: Kevin Lindsay MD;  Location: [facility] OR;  Service: General;  Laterality: Right;       Review of patient's allergies indicates:  No Known Allergies    No current facility-administered medications on file prior to encounter.      Current Outpatient Medications on File Prior to Encounter   Medication Sig    allopurinol (ZYLOPRIM) 100 MG tablet TAKE 1 TABLET (100 MG TOTAL) BY MOUTH ONCE DAILY. (Patient taking differently: TAKE 1 TABLET (100 MG TOTAL) BY MOUTH ONCE DAILY./ takes in AM)    aspirin (ECOTRIN) 81 MG EC tablet  "Take 81 mg by mouth every morning.     BD INSULIN SYRINGE ULTRA-FINE 0.5 mL 31 gauge x 5/16 Syrg USE AS DIRECTED TO INJECT INSULIN TWO TIMES DAILY    bimatoprost (LUMIGAN) 0.03 % ophthalmic drops Place 1 drop into the left eye every evening. (Patient taking differently: Place 1 drop into the right eye every evening. )    blood sugar diagnostic Strp 1 strip by Misc.(Non-Drug; Combo Route) route 4 (four) times daily. [employer]    bumetanide (BUMEX) 2 MG tablet Take 1 tablet (2 mg total) by mouth 2 (two) times daily. (Patient taking differently: Take 2 mg by mouth every morning. )    carvedilol (COREG) 25 MG tablet TAKE 1/2 TABLET BY MOUTH TWICE A DAY WITH MEALS    cholecalciferol, vitamin D3, (VITAMIN D3) 1,000 unit capsule Take 1,000 Units by mouth once daily.    ciprofloxacin HCl (CIPRO) 500 MG tablet Take 1 tablet (500 mg total) by mouth every 12 (twelve) hours. for 14 days    diphenoxylate-atropine 2.5-0.025 mg (LOMOTIL) 2.5-0.025 mg per tablet Take 1 tablet by mouth 4 (four) times daily. for 10 days    HYDROcodone-acetaminophen (NORCO) 5-325 mg per tablet One or 2 every 4-6 hours as needed for pain    insulin glargine (LANTUS) 100 unit/mL injection 50 units bid prn when BS< 150    IRON/VITAMIN B COMPLEX (GERITOL ORAL) Take by mouth.    lancets Misc For tel care    lisinopril (PRINIVIL,ZESTRIL) 5 MG tablet TAKE 1 TABLET (5 MG TOTAL) BY MOUTH ONCE DAILY. (Patient taking differently: Take 5 mg by mouth every morning. )    metOLazone (ZAROXOLYN) 2.5 MG tablet Take 1 tablet (2.5 mg total) by mouth every Mon, Wed, Fri.    metroNIDAZOLE (FLAGYL) 500 MG tablet Take 1 tablet (500 mg total) by mouth every 12 (twelve) hours. The night before surgery for 14 doses    pantoprazole (PROTONIX) 40 MG tablet TAKE 1 TABLET BY MOUTH EVERY DAY FOR ACID REFLUX    pen needle, diabetic (BD ULTRA-FINE JOSLYN PEN NEEDLE) 32 gauge x 5/32" Ndle 1 each by Misc.(Non-Drug; Combo Route) route 4 (four) times daily.    potassium " chloride SA (K-DUR,KLOR-CON) 10 MEQ tablet TAKE 1 TABLET BY MOUTH EVERY DAY (Patient not taking: Reported on 11/15/2018)    psyllium husk, aspartame, (METAMUCIL) 3.4 gram PwPk packet Take 1 packet by mouth once daily.    simvastatin (ZOCOR) 10 MG tablet TAKE 1 TABLET (10 MG TOTAL) BY MOUTH EVERY EVENING.     Family History     Problem Relation (Age of Onset)    Cancer Mother    Heart disease Father    No Known Problems Sister, Brother, Maternal Aunt, Maternal Uncle, Paternal Aunt, Paternal Uncle, Maternal Grandmother, Maternal Grandfather, Paternal Grandmother, Paternal Grandfather    Stroke Father        Tobacco Use    Smoking status: Never Smoker    Smokeless tobacco: Never Used   Substance and Sexual Activity    Alcohol use: Yes     Alcohol/week: 0.0 oz     Comment: NO ALCOHOL 72 HOURS PRIOR TO SX    Drug use: No    Sexual activity: Not Currently     *I have personally reviewed the patients allergies, medical, surgical, family and social history as listed above.     Review of Systems   Constitutional: Negative for chills, diaphoresis, fatigue and fever.   HENT: Negative for congestion, sore throat and voice change.    Eyes: Negative for photophobia and visual disturbance.   Respiratory: Negative for cough, shortness of breath, wheezing and stridor.    Cardiovascular: Negative for chest pain and leg swelling.   Gastrointestinal: Negative for abdominal distention, abdominal pain, constipation, diarrhea, nausea and vomiting.        Positive s/p General Surgery procedure    Endocrine: Negative for polydipsia, polyphagia and polyuria.   Genitourinary: Negative for difficulty urinating, dysuria, flank pain, testicular pain and urgency.   Musculoskeletal: Negative for back pain, joint swelling, neck pain and neck stiffness.   Skin: Positive for wound ( drain with purulant drainage ). Negative for color change and rash.   Allergic/Immunologic: Negative for immunocompromised state.   Neurological: Negative for  dizziness, syncope, weakness, numbness and headaches.   Hematological: Does not bruise/bleed easily.   Psychiatric/Behavioral: Negative for agitation, behavioral problems and confusion.     Objective:     Vital Signs (Most Recent):  Temp: 98.2 °F (36.8 °C) (11/15/18 2307)  Pulse: 73 (11/16/18 0320)  Resp: 18 (11/15/18 2307)  BP: (!) 90/55 (11/15/18 2307)  SpO2: 99 % (11/15/18 2307) Vital Signs (24h Range):  Temp:  [97.5 °F (36.4 °C)-98.8 °F (37.1 °C)] 98.2 °F (36.8 °C)  Pulse:  [73-92] 73  Resp:  [16-20] 18  SpO2:  [95 %-99 %] 99 %  BP: (81-90)/(48-56) 90/55     Weight: 89.8 kg (197 lb 15.6 oz)  Body mass index is 30.1 kg/m².    Physical Exam   Constitutional: He is oriented to person, place, and time. He appears well-developed. He has a sickly appearance. No distress.   elderly   HENT:   Head: Normocephalic and atraumatic.   Nose: Nose normal.   Eyes: Conjunctivae and EOM are normal. Pupils are equal, round, and reactive to light. No scleral icterus.   Neck: Normal range of motion. Neck supple. No tracheal deviation present.   Cardiovascular: Normal rate, regular rhythm, normal heart sounds and intact distal pulses.   No murmur heard.  Pulmonary/Chest: Effort normal and breath sounds normal. No stridor. No respiratory distress. He has no wheezes. He has no rales.   Abdominal: Soft. Bowel sounds are normal. He exhibits no distension. There is no tenderness. There is no guarding.   LLQ KINDRA drain with milky discharge.   ileostomy in the right upper quadrant with collection device.   There is healing midline incision WNL .    LLQ wound VAC dressing noted, c/d/i.    obese   Genitourinary:   Genitourinary Comments: Check ua     Musculoskeletal: Normal range of motion. He exhibits no edema or deformity.   Neurological: He is alert and oriented to person, place, and time. No cranial nerve deficit.   Skin: Skin is warm and dry. Capillary refill takes 2 to 3 seconds. No rash noted. He is not diaphoretic.   Psychiatric: He has  a normal mood and affect. His behavior is normal. Judgment and thought content normal.   Nursing note and vitals reviewed.        CRANIAL NERVES     CN III, IV, VI   Pupils are equal, round, and reactive to light.  Extraocular motions are normal.        Significant Labs:   CBC:   Recent Labs   Lab 11/15/18  1233   WBC 13.50*   HGB 9.9*   HCT 29.3*   *     CMP:   Recent Labs   Lab 11/15/18  1233   *   K 4.3   CL 98   CO2 20*   *   BUN 70*   CREATININE 8.2*   CALCIUM 9.2   ANIONGAP 13   EGFRNONAA 6*     Results for orders placed or performed during the hospital encounter of 11/15/18   Procalcitonin   Result Value Ref Range    Procalcitonin 0.47 (H) <0.25 ng/mL   Creatinine, urine, random   Result Value Ref Range    Creatinine, Random Ur >450.0 (H) 23.0 - 375.0 mg/dL   Sodium, urine, random   Result Value Ref Range    Sodium Urine Random <20 (A) 20 - 250 mmol/L   Protein / creatinine ratio, urine   Result Value Ref Range    Protein, Urine Random 38 (H) 0 - 15 mg/dL    Creatinine, Random Ur >450.0 (H) 23.0 - 375.0 mg/dL    Prot/Creat Ratio, Ur Unable to calculate 0.00 - 0.20   Urinalysis, Reflex to Urine Culture Urine, Clean Catch   Result Value Ref Range    Specimen UA Urine, Catheterized     Color, UA Yellow Yellow, Straw, Polina    Appearance, UA Clear Clear    pH, UA 5.0 5.0 - 8.0    Specific Gravity, UA >=1.030 (A) 1.005 - 1.030    Protein, UA Negative Negative    Glucose, UA Negative Negative    Ketones, UA Trace (A) Negative    Bilirubin (UA) 1+ (A) Negative    Occult Blood UA Trace (A) Negative    Nitrite, UA Negative Negative    Urobilinogen, UA Negative <2.0 EU/dL    Leukocytes, UA Negative Negative   Uric acid   Result Value Ref Range    Uric Acid 16.4 (H) 3.4 - 7.0 mg/dL   Brain natriuretic peptide   Result Value Ref Range     (H) 0 - 99 pg/mL   Lactic acid, plasma   Result Value Ref Range    Lactate (Lactic Acid) 0.9 0.5 - 2.2 mmol/L   C-reactive protein   Result Value Ref Range     CRP 67.3 (H) 0.0 - 8.2 mg/L   Sedimentation rate   Result Value Ref Range    Sed Rate 112 (H) 0 - 10 mm/Hr   POCT glucose   Result Value Ref Range    POCT Glucose 141 (H) 70 - 110 mg/dL         All pertinent labs within the past 24 hours have been reviewed.    Significant Imaging:  Pertinent radiology results in past 24 hours have been reviewed.     X-Ray Chest 1 View   Order: [id number]   Status:  Final result   Visible to patient:  No (Not Released) Next appt:  11/20/2018 at 07:20 AM in Lab ([facility])   Details     Reading Physician Reading Date Result Priority   Len Mercedes MD 11/15/2018       Narrative     EXAMINATION:  XR CHEST 1 VIEW    CLINICAL HISTORY:  sob;  shortness of breath.    COMPARISON:  11/04/2016.    FINDINGS:  Lungs are clear.  Cardiac silhouette mildly enlarged.  Left-sided pacemaker/AICD in place, unchanged since the previous exam.  Mild prominence of the pulmonary vasculature could represent mild pulmonary vascular congestion.  Previously identified right internal article on has been removed.No significant bony findings.      Impression       1. Mildly prominent pulmonary vasculature, which could indicate mild pulmonary vascular congestion.  2. Interval removal of right internal jugular line.      Electronically signed by: Len Mercedes MD  Date: 11/15/2018  Time: 18:42                   I have personally reviewed the patients labs, imaging, and discussed the patient case in detail with the Er provider    Assessment/Plan:     * Open wound of anterior abdominal wall with complication    General surgery managing  Blood cultures pending, lactic pending  Continue cipro/flagyl  CT ABD pending.     NO SEPSIS. Patient hypotension influenced by combined CHF and dehydration. Other vitals stable, afebrile.      Acute on chronic renal failure    Continue gentle hydration and monitior hydration status as has CHF  montior rfp  Renal managing         Chronic combined systolic and  diastolic congestive heart failure    Monitor hydration status  Continue bb, ace/arb, statin,   Asa antiplatelet/anticoag hold as may need surgical intervetion  Consider repeat echo  Optimization pending course         Type 2 diabetes mellitus with stage 4 chronic kidney disease, with long-term current use of insulin    Check a1c  ssi  montior        Hypertension associated with diabetes    Continue bp medication  Monitor trends  Optimization pending cousre         VTE Risk Mitigation (From admission, onward)        Ordered     Place sequential compression device  Until discontinued      11/15/18 2006     IP VTE HIGH RISK PATIENT  Once     No pharmacological due to possible surgery intervention. Bleed risk 11/15/18 2006              Thank you for your consult. HM will follow-up with patient. Please contact us if you have any additional questions.            Rhys Bah NP  Department of Hospital Medicine   [facility]

## 2018-11-16 NOTE — HPI
Patient is a 63 y.o. male presents after a colostomy reversal, repair of parastomal hernia and protective ileostomy.     The patient states he is doing okay but he is a bit tired and fatigued he states that purulent material started coming out of his drain about 2 days ago.  He denies any fever or chills.  He states that his ileostomy output is more formed.  The patient was admitted from clinic once we noticed that his BUN and creatinine were significantly elevated

## 2018-11-16 NOTE — ASSESSMENT & PLAN NOTE
Continue gentle hydration and monitior hydration status as has CHF  montior rfp  Renal managing

## 2018-11-17 LAB
ALBUMIN SERPL BCP-MCNC: 2.3 G/DL
ANION GAP SERPL CALC-SCNC: 13 MMOL/L
BUN SERPL-MCNC: 75 MG/DL
CALCIUM SERPL-MCNC: 8.4 MG/DL
CHLORIDE SERPL-SCNC: 102 MMOL/L
CO2 SERPL-SCNC: 17 MMOL/L
CREAT SERPL-MCNC: 7.2 MG/DL
EST. GFR  (AFRICAN AMERICAN): 8 ML/MIN/1.73 M^2
EST. GFR  (NON AFRICAN AMERICAN): 7 ML/MIN/1.73 M^2
GLUCOSE SERPL-MCNC: 100 MG/DL
PHOSPHATE SERPL-MCNC: 6.4 MG/DL
POCT GLUCOSE: 104 MG/DL (ref 70–110)
POCT GLUCOSE: 127 MG/DL (ref 70–110)
POCT GLUCOSE: 130 MG/DL (ref 70–110)
POCT GLUCOSE: 133 MG/DL (ref 70–110)
POCT GLUCOSE: 157 MG/DL (ref 70–110)
POTASSIUM SERPL-SCNC: 4 MMOL/L
SODIUM SERPL-SCNC: 132 MMOL/L

## 2018-11-17 PROCEDURE — 25000003 PHARM REV CODE 250: Performed by: SURGERY

## 2018-11-17 PROCEDURE — 99232 SBSQ HOSP IP/OBS MODERATE 35: CPT | Mod: ,,, | Performed by: INTERNAL MEDICINE

## 2018-11-17 PROCEDURE — S0030 INJECTION, METRONIDAZOLE: HCPCS | Performed by: SURGERY

## 2018-11-17 PROCEDURE — 99024 POSTOP FOLLOW-UP VISIT: CPT | Mod: ,,, | Performed by: COLON & RECTAL SURGERY

## 2018-11-17 PROCEDURE — 36415 COLL VENOUS BLD VENIPUNCTURE: CPT

## 2018-11-17 PROCEDURE — 80069 RENAL FUNCTION PANEL: CPT

## 2018-11-17 PROCEDURE — 21400001 HC TELEMETRY ROOM

## 2018-11-17 PROCEDURE — 63600175 PHARM REV CODE 636 W HCPCS: Performed by: NURSE PRACTITIONER

## 2018-11-17 RX ORDER — CARVEDILOL 12.5 MG/1
12.5 TABLET ORAL 2 TIMES DAILY
Status: DISCONTINUED | OUTPATIENT
Start: 2018-11-18 | End: 2018-11-21 | Stop reason: HOSPADM

## 2018-11-17 RX ADMIN — CIPROFLOXACIN 400 MG: 2 INJECTION, SOLUTION INTRAVENOUS at 11:11

## 2018-11-17 RX ADMIN — METRONIDAZOLE 500 MG: 500 INJECTION, SOLUTION INTRAVENOUS at 05:11

## 2018-11-17 RX ADMIN — SIMVASTATIN 10 MG: 5 TABLET, FILM COATED ORAL at 10:11

## 2018-11-17 RX ADMIN — CARVEDILOL 25 MG: 12.5 TABLET, FILM COATED ORAL at 09:11

## 2018-11-17 RX ADMIN — SODIUM CHLORIDE: 0.9 INJECTION, SOLUTION INTRAVENOUS at 07:11

## 2018-11-17 RX ADMIN — PSYLLIUM HUSK 6 G: 6 GRANULE ORAL at 09:11

## 2018-11-17 RX ADMIN — ALLOPURINOL 100 MG: 100 TABLET ORAL at 09:11

## 2018-11-17 RX ADMIN — METRONIDAZOLE 500 MG: 500 INJECTION, SOLUTION INTRAVENOUS at 10:11

## 2018-11-17 RX ADMIN — METRONIDAZOLE 500 MG: 500 INJECTION, SOLUTION INTRAVENOUS at 12:11

## 2018-11-17 RX ADMIN — PANTOPRAZOLE SODIUM 40 MG: 40 TABLET, DELAYED RELEASE ORAL at 09:11

## 2018-11-17 NOTE — ASSESSMENT & PLAN NOTE
Urine output has improved.  IV fluids continued.  No CHF by exam.  Creatinine has improved down to 7.2.  No dialysis today

## 2018-11-17 NOTE — PROGRESS NOTES
[facility] -   General Surgery  Progress Note    Subjective:     History of Present Illness:  Patient is a 63 y.o. male presents after a colostomy reversal, repair of parastomal hernia and protective ileostomy.     The patient states he is doing okay but he is a bit tired and fatigued he states that purulent material started coming out of his drain about 2 days ago.  He denies any fever or chills.  He states that his ileostomy output is more formed.  The patient was admitted from clinic once we noticed that his BUN and creatinine were significantly elevated        Post-Op Info:  * No surgery found *         Interval History:  No acute events overnight.  Patient reports feeling somewhat better but still fatigued.  Creatinine improving.  KINDRA drain still with purulent output.  Ostomy within bilious output.    Medications:  Continuous Infusions:   sodium chloride 0.9% 100 mL/hr at 11/16/18 1720     Scheduled Meds:   allopurinol  100 mg Oral Daily    carvedilol  25 mg Oral Daily    ciprofloxacin (CIPRO)400mg/200ml D5W IVPB  400 mg Intravenous Q12H    lidocaine (PF) 10 mg/ml (1%)  1 mL Other Once    metronidazole  500 mg Intravenous Q8H    pantoprazole  40 mg Oral Daily    psyllium husk  1 packet Oral Daily    simvastatin  10 mg Oral QHS     PRN Meds:acetaminophen, dextrose 50%, glucagon (human recombinant), insulin aspart U-100, ondansetron     Review of patient's allergies indicates:  No Known Allergies  Objective:     Vital Signs (Most Recent):  Temp: 98.5 °F (36.9 °C) (11/17/18 0749)  Pulse: 80 (11/17/18 0749)  Resp: 18 (11/17/18 0749)  BP: 106/60 (11/17/18 0913)  SpO2: 97 % (11/17/18 0749) Vital Signs (24h Range):  Temp:  [98.1 °F (36.7 °C)-98.5 °F (36.9 °C)] 98.5 °F (36.9 °C)  Pulse:  [67-83] 80  Resp:  [14-18] 18  SpO2:  [96 %-98 %] 97 %  BP: ()/(50-60) 106/60     Weight: 87 kg (191 lb 12.8 oz)  Body mass index is 29.16 kg/m².    Intake/Output - Last 3 Shifts       11/15 0700 - 11/16  0659 11/16 0700 - 11/17 0659 11/17 0700 - 11/18 0659    P.O. 0 120 240    I.V. (mL/kg) 775 (8.6)      IV Piggyback 400      Total Intake(mL/kg) 1175 (13.1) 120 (1.4) 240 (2.8)    Urine (mL/kg/hr) 205 425 (0.2)     Drains 160 110     Other 0 25     Stool 525 850 400    Total Output 890 1410 400    Net +285 -1290 -160           Stool Occurrence 1 x            Physical Exam   Constitutional: He is oriented to person, place, and time. He appears well-developed.   HENT:   Head: Normocephalic and atraumatic.   Eyes: Conjunctivae and EOM are normal.   Neck: Normal range of motion. No thyromegaly present.   Cardiovascular: Normal rate and regular rhythm.   Pulmonary/Chest: Effort normal. No respiratory distress.   Abdominal:   Soft, nondistended; nontender; ostomy pink and viable with thin bilious output in bag; KINDRA drain with purulent drainage in bulb   Musculoskeletal: Normal range of motion. He exhibits no edema or tenderness.   Neurological: He is alert and oriented to person, place, and time.   Skin: Skin is warm and dry. Capillary refill takes less than 2 seconds. No rash noted.   Psychiatric: He has a normal mood and affect.       Significant Labs:  CBC:   Recent Labs   Lab 11/16/18  0437   WBC 14.12*   RBC 3.35*   HGB 9.2*   HCT 27.4*   *   MCV 82   MCH 27.5   MCHC 33.6     BMP:   Recent Labs   Lab 11/17/18  0442      *   K 4.0      CO2 17*   BUN 75*   CREATININE 7.2*   CALCIUM 8.4*         Assessment/Plan:     * Open wound of anterior abdominal wall with complication    Wound VAC, wound VAC changes Monday Wednesday and Friday.  Wound care consult     Gastrointestinal anastomotic leak    No acute surgical intervention at this time  Continue KINDRA drain to bulb sxn  Will attempt to allow leak to naturally heal with fecal diversion with ileostomy     Acute on chronic renal failure    Management per Nephrology consult     Ileostomy in place    Monitor output.   May need antidiarrheals to slow  ostomy output  Need ostomy education for recording at home     Chronic combined systolic and diastolic congestive heart failure    Cardiology and Hospital Medicine consulted     Gastroesophageal reflux disease    PPI     Type 2 diabetes mellitus with stage 4 chronic kidney disease, with long-term current use of insulin    Hospital medicine and nephrology consulted     Ischemic cardiomyopathy    Cardiology consult for further management     Hypertension associated with diabetes    Hospital Medicine consult for management         Quinn Aragon MD  General Surgery  [facility]

## 2018-11-17 NOTE — ASSESSMENT & PLAN NOTE
- Glucose stable. Continue accuchecks and moderate dose SSI  - Documented hypotensive pressures. Will put holding parameters on BB and monitor

## 2018-11-17 NOTE — ASSESSMENT & PLAN NOTE
Monitor output.   May need antidiarrheals to slow ostomy output  Need ostomy education for recording at home

## 2018-11-17 NOTE — SUBJECTIVE & OBJECTIVE
Interval History:  Urine output has improved.  IV fluids continued.  No CHF by exam.  Creatinine has improved down to 7.2.  No dialysis today    Review of patient's allergies indicates:  No Known Allergies  Current Facility-Administered Medications   Medication Frequency    0.9%  NaCl infusion Continuous    acetaminophen tablet 650 mg Q8H PRN    allopurinol tablet 100 mg Daily    carvedilol tablet 25 mg Daily    ciprofloxacin (CIPRO)400mg/200ml D5W IVPB 400 mg Q12H    dextrose 50% injection 12.5 g PRN    glucagon (human recombinant) injection 1 mg PRN    insulin aspart U-100 pen 1-10 Units Q6H PRN    lidocaine (PF) 10 mg/ml (1%) injection 10 mg Once    metronidazole IVPB 500 mg Q8H    ondansetron injection 4 mg Q8H PRN    pantoprazole EC tablet 40 mg Daily    psyllium husk packet 6 g Daily    simvastatin tablet 10 mg QHS       Objective:     Vital Signs (Most Recent):  Temp: 98.5 °F (36.9 °C) (11/17/18 0749)  Pulse: 80 (11/17/18 0749)  Resp: 18 (11/17/18 0749)  BP: 106/60 (11/17/18 0913)  SpO2: 97 % (11/17/18 0749)  O2 Device (Oxygen Therapy): room air (11/17/18 0749) Vital Signs (24h Range):  Temp:  [98.1 °F (36.7 °C)-98.5 °F (36.9 °C)] 98.5 °F (36.9 °C)  Pulse:  [67-83] 80  Resp:  [14-18] 18  SpO2:  [96 %-98 %] 97 %  BP: ()/(50-60) 106/60     Weight: 87 kg (191 lb 12.8 oz) (11/17/18 0553)  Body mass index is 29.16 kg/m².  Body surface area is 2.04 meters squared.    I/O last 3 completed shifts:  In: 1295 [P.O.:120; I.V.:775; IV Piggyback:400]  Out: 2100 [Urine:630; Drains:245; Other:25; Stool:1200]    Physical Exam   Constitutional: He is oriented to person, place, and time. He appears well-developed. No distress.   Some muscle wasting, signs of dehydration, malnutrition   HENT:   Head: Normocephalic.   Eyes: EOM are normal. Pupils are equal, round, and reactive to light.   Neck: Normal range of motion. Neck supple. No JVD present. No thyromegaly present.   Cardiovascular: Normal rate, regular  rhythm, S1 normal, S2 normal, normal heart sounds and intact distal pulses. PMI is not displaced. Exam reveals no gallop and no friction rub.   No murmur heard.  No clinical evidence of acute CHF at this time.   Pulmonary/Chest: Effort normal and breath sounds normal. No respiratory distress. He has no wheezes. He has no rales. He exhibits no tenderness.   Abdominal: He exhibits no distension and no mass. There is no hepatosplenomegaly. There is no tenderness. There is no rebound and no CVA tenderness. No hernia.    ileostomy in place, wound VAC on the left lower quadrant   Musculoskeletal: Normal range of motion. He exhibits no edema or tenderness.   Lymphadenopathy:     He has no cervical adenopathy.   Neurological: He is alert and oriented to person, place, and time. He has normal reflexes. He is not disoriented. He displays normal reflexes. No cranial nerve deficit. He exhibits normal muscle tone. Coordination normal.   Skin: Skin is warm and dry. Capillary refill takes less than 2 seconds. No rash noted. No erythema.   Psychiatric: He has a normal mood and affect. His behavior is normal. Judgment and thought content normal.   Nursing note and vitals reviewed.      Significant Labs:  All labs within the past 24 hours have been reviewed.     Significant Imaging:

## 2018-11-17 NOTE — PLAN OF CARE
Problem: Patient Care Overview  Goal: Plan of Care Review  Outcome: Ongoing (interventions implemented as appropriate)  Wound vac at 125 suction. Ileostomy to the right lower abdomen with liquid green stool and flatus noted. KINDRA drain noted to the lower abdomen. Blood sugar monitored. Last blood sugar 157 which per sliding scale calls for 2 units. Patient currently eating dinner tray and hasnt needed cover since admit. MD notified and no coverage to be given at this time. Chart check completed.

## 2018-11-17 NOTE — ASSESSMENT & PLAN NOTE
-Patient echo in Oct 2018 shows Moderately depressed left ventricular systolic function (EF 30-35% and RV dysfunction   -Has no evidence of volume overload on exam. Patient appears dry on exam  -Continue IV hydration and will address overload if needed later   -Continue BB  -OK to give Hydralazine if needed for BP control since ACEI DC'd

## 2018-11-17 NOTE — ASSESSMENT & PLAN NOTE
-Cardiology following  -He does not appear to be volume overloaded at this time  -Continue cardiac medications

## 2018-11-17 NOTE — ASSESSMENT & PLAN NOTE
-Patient echo in Oct 2018 shows Moderately depressed left ventricular systolic function (EF 30-35% and RV dysfunction   -Has no evidence of volume overload on exam. Patient appears dry on exam  -Continue IV hydration and will address overload if needed later   -Continue BB  -OK to give Hydralazine if needed for BP control since ACEI DC'd   -Recommend transfusing 1 unit of blood. This has been discussed with general surgery

## 2018-11-17 NOTE — SUBJECTIVE & OBJECTIVE
Interval History:  No acute events overnight.  Patient reports feeling somewhat better but still fatigued.  Creatinine improving.  KINDRA drain still with purulent output.  Ostomy within bilious output.    Medications:  Continuous Infusions:   sodium chloride 0.9% 100 mL/hr at 11/16/18 1720     Scheduled Meds:   allopurinol  100 mg Oral Daily    carvedilol  25 mg Oral Daily    ciprofloxacin (CIPRO)400mg/200ml D5W IVPB  400 mg Intravenous Q12H    lidocaine (PF) 10 mg/ml (1%)  1 mL Other Once    metronidazole  500 mg Intravenous Q8H    pantoprazole  40 mg Oral Daily    psyllium husk  1 packet Oral Daily    simvastatin  10 mg Oral QHS     PRN Meds:acetaminophen, dextrose 50%, glucagon (human recombinant), insulin aspart U-100, ondansetron     Review of patient's allergies indicates:  No Known Allergies  Objective:     Vital Signs (Most Recent):  Temp: 98.5 °F (36.9 °C) (11/17/18 0749)  Pulse: 80 (11/17/18 0749)  Resp: 18 (11/17/18 0749)  BP: 106/60 (11/17/18 0913)  SpO2: 97 % (11/17/18 0749) Vital Signs (24h Range):  Temp:  [98.1 °F (36.7 °C)-98.5 °F (36.9 °C)] 98.5 °F (36.9 °C)  Pulse:  [67-83] 80  Resp:  [14-18] 18  SpO2:  [96 %-98 %] 97 %  BP: ()/(50-60) 106/60     Weight: 87 kg (191 lb 12.8 oz)  Body mass index is 29.16 kg/m².    Intake/Output - Last 3 Shifts       11/15 0700 - 11/16 0659 11/16 0700 - 11/17 0659 11/17 0700 - 11/18 0659    P.O. 0 120 240    I.V. (mL/kg) 775 (8.6)      IV Piggyback 400      Total Intake(mL/kg) 1175 (13.1) 120 (1.4) 240 (2.8)    Urine (mL/kg/hr) 205 425 (0.2)     Drains 160 110     Other 0 25     Stool 525 850 400    Total Output 890 1410 400    Net +285 -1290 -160           Stool Occurrence 1 x            Physical Exam   Constitutional: He is oriented to person, place, and time. He appears well-developed.   HENT:   Head: Normocephalic and atraumatic.   Eyes: Conjunctivae and EOM are normal.   Neck: Normal range of motion. No thyromegaly present.   Cardiovascular: Normal  rate and regular rhythm.   Pulmonary/Chest: Effort normal. No respiratory distress.   Abdominal:   Soft, nondistended; nontender; ostomy pink and viable with thin bilious output in bag; KINDRA drain with purulent drainage in bulb   Musculoskeletal: Normal range of motion. He exhibits no edema or tenderness.   Neurological: He is alert and oriented to person, place, and time.   Skin: Skin is warm and dry. Capillary refill takes less than 2 seconds. No rash noted.   Psychiatric: He has a normal mood and affect.       Significant Labs:  CBC:   Recent Labs   Lab 11/16/18  0437   WBC 14.12*   RBC 3.35*   HGB 9.2*   HCT 27.4*   *   MCV 82   MCH 27.5   MCHC 33.6     BMP:   Recent Labs   Lab 11/17/18  0442      *   K 4.0      CO2 17*   BUN 75*   CREATININE 7.2*   CALCIUM 8.4*

## 2018-11-17 NOTE — PROGRESS NOTES
[facility] -   Cardiology  Progress Note    Patient Name: Yan Choudhury  MRN: [id number]  Admission Date: 11/15/2018  Hospital Length of Stay: 2 days  Code Status: Full Code   Attending Physician: Kevin Lindsay MD   Primary Care Physician: Sandra Estevez MD  Expected Discharge Date:   Principal Problem:Open wound of anterior abdominal wall with complication    Subjective:   Brief HPI:    Patient is a 63 y.o. male CAD/ICM, DCM, LBBB, HTN, CRI, DM, CRT-ICD,  PTHN, dyslipidemia, obesity. Nonsmoker.   LHC was done 2009 showed diffuse CAD, and severe distal CAD involving apical LAD, distal RCA/distal LCX.   Echo Oct 2010 showed LVEF 25%.   PET stress 2016 showed inferior scar, no significant ischemia noted and LVEF < 35%. Is s/p BIV ICD 3/16 for CHF/LBBB.  Echo 12/17 EF 20%, DD, mod PHTN and unchanged in Oct 2018.  [place] [facility] [place] from general surgery office. Is post colostomy reversal, repair of parastomal hernia and protective ileostomy. Had reports of purulent material from the drain and complained of fatigue. Labs checked as an OP and noted to have BROOKE. Baseline creatine around 1.8- 2, but creatine of 8.2 on admit. Patient takes diuretics as an OP and sometimes adjusts dose on his own. Has been treated with abx and IVF. . CXR shows no acute process.         Hospital Course:   11/17/18- Renal function with no significant improvement today. Creatine down to 7.2 from 8.0 yesterday. Still receiving IVF for dehydration. Has no evidence of volume overload on exam. Is making urine, but is concentrated. KINDRA drain in place with purulent output noted. Ileostomy with liquid output. Wound vac in place.         Review of Systems   Constitution: Positive for weakness and malaise/fatigue. Negative for diaphoresis, weight gain and weight loss.   HENT: Negative for congestion and nosebleeds.    Cardiovascular: Negative for chest pain, claudication, cyanosis, dyspnea on exertion, irregular  heartbeat, leg swelling, near-syncope, orthopnea, palpitations, paroxysmal nocturnal dyspnea and syncope.   Respiratory: Positive for shortness of breath (chronic ). Negative for cough, hemoptysis, sleep disturbances due to breathing, snoring, sputum production and wheezing.    Hematologic/Lymphatic: Negative for bleeding problem. Does not bruise/bleed easily.   Skin: Negative for rash.   Musculoskeletal: Negative for arthritis, back pain, falls, joint pain, muscle cramps and muscle weakness.   Gastrointestinal: Positive for bloating. Negative for abdominal pain, constipation, diarrhea, heartburn, hematemesis, hematochezia, melena and nausea.        Ileostomy    Genitourinary: Negative for dysuria, hematuria and nocturia.   Neurological: Negative for excessive daytime sleepiness, dizziness, headaches, light-headedness, loss of balance, numbness and vertigo.     Objective:     Vital Signs (Most Recent):  Temp: 98.5 °F (36.9 °C) (11/17/18 0749)  Pulse: 80 (11/17/18 0749)  Resp: 18 (11/17/18 0749)  BP: 106/60 (11/17/18 0913)  SpO2: 97 % (11/17/18 0749) Vital Signs (24h Range):  Temp:  [98.1 °F (36.7 °C)-98.5 °F (36.9 °C)] 98.5 °F (36.9 °C)  Pulse:  [67-83] 80  Resp:  [14-18] 18  SpO2:  [96 %-98 %] 97 %  BP: ()/(50-60) 106/60     Weight: 87 kg (191 lb 12.8 oz)  Body mass index is 29.16 kg/m².     SpO2: 97 %  O2 Device (Oxygen Therapy): room air      Intake/Output Summary (Last 24 hours) at 11/17/2018 1126  Last data filed at 11/17/2018 0900  Gross per 24 hour   Intake 240 ml   Output 1810 ml   Net -1570 ml       Lines/Drains/Airways     Drain                 Ileostomy 10/30/18 Loop RUQ 18 days         Closed/Suction Drain 10/30/18 1300 Abdomen Bulb 17 days         Urethral Catheter 11/15/18 1700 Double-lumen 16 Fr. 1 day          Pressure Ulcer                 Negative Pressure Wound Therapy  16 days          Peripheral Intravenous Line                 Peripheral IV - Single Lumen 11/16/18 1235 Right;Posterior  Wrist less than 1 day                Physical Exam   Constitutional: He is oriented to person, place, and time. He appears well-developed and well-nourished.   Neck: Neck supple. No JVD present.   Cardiovascular: Normal rate, regular rhythm, normal heart sounds and normal pulses. Exam reveals no friction rub.   No murmur heard.  Pulmonary/Chest: Effort normal and breath sounds normal. No respiratory distress. He has no wheezes. He has no rales.   Abdominal: Soft. Bowel sounds are normal. He exhibits no distension.   ileostomy in place, wound VAC on the left lower quadrant   KINDRA drain with purulent drainage      Musculoskeletal: He exhibits no edema or tenderness.   Neurological: He is alert and oriented to person, place, and time.   Skin: Skin is warm and dry. No rash noted.   Psychiatric: He has a normal mood and affect. His behavior is normal.   Nursing note and vitals reviewed.      Significant Labs:   All pertinent lab results from the last 24 hours have been reviewed. and   Recent Lab Results       11/17/18  0549   11/17/18  0442   11/16/18  2317        Albumin   2.3       Anion Gap   13       BUN, Bld   75       Calcium   8.4       Chloride   102       CO2   17       Creatinine   7.2       eGFR if    8       eGFR if non    7  Comment:  Calculation used to obtain the estimated glomerular filtration  rate (eGFR) is the CKD-EPI equation.          Glucose   100       Phosphorus   6.4       POCT Glucose 104   127     Potassium   4.0       Sodium   132             Significant Imaging: Echocardiogram:   2D echo with color flow doppler:   Results for orders placed or performed in visit on 10/01/18   2D echo with color flow doppler   Result Value Ref Range    QEF 30 (A) 55 - 65    Diastolic Dysfunction Yes (A)     Aortic Valve Regurgitation MILD TO MODERATE (A)     Est. PA Systolic Pressure 35.49     Tricuspid Valve Regurgitation MILD      Assessment and Plan:       Acute on chronic renal  failure    -Likely from over diuresis. Patient needing aggressive IV hydration , but needs close monitoring for fluid overload.   -Will diurese later if needed      Chronic combined systolic and diastolic congestive heart failure    -Patient echo in Oct 2018 shows Moderately depressed left ventricular systolic function (EF 30-35% and RV dysfunction   -Has no evidence of volume overload on exam. Patient appears dry on exam  -Continue IV hydration and will address overload if needed later   -Continue BB  -OK to give Hydralazine if needed for BP control since ACEI DC'd   -Recommend transferring patient to tele for monitoring        VTE Risk Mitigation (From admission, onward)        Ordered     Place sequential compression device  Until discontinued      11/15/18 2006     IP VTE HIGH RISK PATIENT  Once      11/15/18 2006      Chart reviewed. Patient examined by Dr. Mercer and agrees with plan that has been outlined.       MANPREET Vivas  Cardiology  [facility] [place]

## 2018-11-17 NOTE — PROGRESS NOTES
[facility] Hospital Medicine  Progress Note    Patient Name: Yan Cohudhury  MRN: [id number]  Patient Class: IP- Inpatient   Admission Date: 11/15/2018  Length of Stay: 2 days  Attending Physician: Kevin Lindsay MD  Primary Care Provider: Sandra Estevez MD        Subjective:     Principal Problem:Open wound of anterior abdominal wall with complication    HPI:  Patient is a 63 y.o. male presents after a colostomy reversal, repair of parastomal hernia and protective ileostomy with complaint of purulent material from the drain. Patient was direct admitted by General Surgery. Renal was consulted for Acute on chronic kidney injury. Renal Note reviewed and plans for gentle hydration overnight. HM consulted for assistance with medical management. Patient seen and examined in his room. Patient with no current complaints other than purulent drain discharge. No modifying factors, no associated symptoms. Disucssed case with Dr. Lindsay patient is to receive both flagyl and cipro and CT ABD pending. Significant medical conditions include CAD, CHF, HTN, DM2.          Hospital Course:  11/16/18 creatinine still 8. Nephrology and Cardiology assisting with management. Renal ultrasound pending. No surgical intervention indicated at present. As of 11/17 creatinine has improved from 8.0 to 7.2. No HD to be performed at this time. Renal US showed minimal increased cortical echogenicity right kidney. Hypotensive episodes overnight and systolic now 97. Pt currently taking Coreg 25 mg PO daily. Will split the dose of Coreg to 12.5 mg PO BID with holding parameters. Blood sugars controlled.     Interval History: Pt seen and examined. Wife at bedside. Pt has no complaints at this time.     Review of Systems   Constitutional: Negative for chills, diaphoresis, fatigue and fever.   HENT: Negative for congestion, sore throat and voice change.    Eyes: Negative for photophobia and visual disturbance.   Respiratory: Negative for  cough, shortness of breath, wheezing and stridor.    Cardiovascular: Negative for chest pain and leg swelling.   Gastrointestinal: Negative for abdominal distention, abdominal pain, constipation, diarrhea, nausea and vomiting.        S/p abdominal surgery     Endocrine: Negative for polydipsia, polyphagia and polyuria.   Genitourinary: Negative for difficulty urinating, dysuria, flank pain, testicular pain and urgency.   Musculoskeletal: Negative for back pain, joint swelling, neck pain and neck stiffness.   Skin: Negative for color change, rash and wound ( ).   Allergic/Immunologic: Negative for immunocompromised state.   Neurological: Negative for dizziness, syncope, weakness, numbness and headaches.   Hematological: Does not bruise/bleed easily.   Psychiatric/Behavioral: Negative for agitation, behavioral problems and confusion.     Objective:     Vital Signs (Most Recent):  Temp: 99 °F (37.2 °C) (11/17/18 1205)  Pulse: 72 (11/17/18 1205)  Resp: 20 (11/17/18 1205)  BP: (!) 97/56 (11/17/18 1205)  SpO2: 95 % (11/17/18 1205) Vital Signs (24h Range):  Temp:  [98.1 °F (36.7 °C)-99 °F (37.2 °C)] 99 °F (37.2 °C)  Pulse:  [67-83] 72  Resp:  [14-20] 20  SpO2:  [95 %-98 %] 95 %  BP: ()/(50-60) 97/56     Weight: 87 kg (191 lb 12.8 oz)  Body mass index is 29.16 kg/m².    Intake/Output Summary (Last 24 hours) at 11/17/2018 1358  Last data filed at 11/17/2018 0900  Gross per 24 hour   Intake 240 ml   Output 1810 ml   Net -1570 ml      Physical Exam   Constitutional: He is oriented to person, place, and time. He appears well-developed. No distress.   HENT:   Head: Normocephalic.   Eyes: EOM are normal. Pupils are equal, round, and reactive to light.   Neck: Normal range of motion. Neck supple. No JVD present. No thyromegaly present.   Cardiovascular: Normal rate, regular rhythm, S1 normal, S2 normal, normal heart sounds and intact distal pulses. PMI is not displaced. Exam reveals no gallop and no friction rub.   No murmur  heard.  Pulmonary/Chest: Effort normal and breath sounds normal. No respiratory distress. He has no wheezes. He has no rales. He exhibits no tenderness.   Abdominal: Soft. He exhibits no distension and no mass. There is no hepatosplenomegaly. There is no tenderness. There is no rebound and no CVA tenderness. No hernia.    ileostomy in place, wound VAC  LLQ, KINDRA drain with purulent drainage   Genitourinary:   Genitourinary Comments: Guzman in place with kelton urine   Musculoskeletal: Normal range of motion. He exhibits no edema or tenderness.   Lymphadenopathy:     He has no cervical adenopathy.   Neurological: He is alert and oriented to person, place, and time. He has normal reflexes. He is not disoriented. He displays normal reflexes. No cranial nerve deficit. He exhibits normal muscle tone. Coordination normal.   Skin: Skin is warm and dry. Capillary refill takes less than 2 seconds. No rash noted. No erythema.   Psychiatric: He has a normal mood and affect. His behavior is normal. Judgment and thought content normal.   Nursing note and vitals reviewed.      Significant Labs:   BMP:   Recent Labs   Lab 11/17/18  0442      *   K 4.0      CO2 17*   BUN 75*   CREATININE 7.2*   CALCIUM 8.4*     POCT Glucose:   Recent Labs   Lab 11/16/18  2317 11/17/18  0549 11/17/18  1135   POCTGLUCOSE 127* 104 130*       Significant Imaging:       Assessment/Plan:      * Open wound of anterior abdominal wall with complication    -General surgery managing  -IV antibiotics with Ciprofloxacin and Flagyl   -no indication for further surgical intervention for now           Gastrointestinal anastomotic leak    -General Surgery managing  -KINDRA drain in place       Acute on chronic renal failure    -Creatine currently 7.2 with metabolic acidosis. Renal ultrasound with no acute findings.   -Nephrology following; urine studies abnormal and will be managed by Nephrology team  -continue gentle hydration       Chronic combined  systolic and diastolic congestive heart failure    -Currently compensated  -Monitor hydration status  -Continue bb with holding parameters  -Antiplatelet/anticoag hold as may need surgical intervetion           Ischemic cardiomyopathy    -Cardiology following  -He does not appear to be volume overloaded at this time  -Continue cardiac medications     Hypertension associated with diabetes    - Glucose stable. Continue accuchecks and moderate dose SSI  - Documented hypotensive pressures. Will put holding parameters on BB and monitor          VTE Risk Mitigation (From admission, onward)        Ordered     Place sequential compression device  Until discontinued      11/15/18 2006     IP VTE HIGH RISK PATIENT  Once      11/15/18 2006              MANPREET Fuentes  Department of Hospital Medicine   [facility]

## 2018-11-17 NOTE — PLAN OF CARE
Problem: Patient Care Overview  Goal: Plan of Care Review  Outcome: Ongoing (interventions implemented as appropriate)  Pt in bed resting. No changes today. VSS with NADN. Side rails x 2 and bed alarm set. 12 hr chart check complete. Will continue to monitor patient.

## 2018-11-17 NOTE — SUBJECTIVE & OBJECTIVE
Interval History: Pt seen and examined. Wife at bedside. Pt has no complaints at this time.     Review of Systems   Constitutional: Negative for chills, diaphoresis, fatigue and fever.   HENT: Negative for congestion, sore throat and voice change.    Eyes: Negative for photophobia and visual disturbance.   Respiratory: Negative for cough, shortness of breath, wheezing and stridor.    Cardiovascular: Negative for chest pain and leg swelling.   Gastrointestinal: Negative for abdominal distention, abdominal pain, constipation, diarrhea, nausea and vomiting.        S/p abdominal surgery     Endocrine: Negative for polydipsia, polyphagia and polyuria.   Genitourinary: Negative for difficulty urinating, dysuria, flank pain, testicular pain and urgency.   Musculoskeletal: Negative for back pain, joint swelling, neck pain and neck stiffness.   Skin: Negative for color change, rash and wound ( ).   Allergic/Immunologic: Negative for immunocompromised state.   Neurological: Negative for dizziness, syncope, weakness, numbness and headaches.   Hematological: Does not bruise/bleed easily.   Psychiatric/Behavioral: Negative for agitation, behavioral problems and confusion.     Objective:     Vital Signs (Most Recent):  Temp: 99 °F (37.2 °C) (11/17/18 1205)  Pulse: 72 (11/17/18 1205)  Resp: 20 (11/17/18 1205)  BP: (!) 97/56 (11/17/18 1205)  SpO2: 95 % (11/17/18 1205) Vital Signs (24h Range):  Temp:  [98.1 °F (36.7 °C)-99 °F (37.2 °C)] 99 °F (37.2 °C)  Pulse:  [67-83] 72  Resp:  [14-20] 20  SpO2:  [95 %-98 %] 95 %  BP: ()/(50-60) 97/56     Weight: 87 kg (191 lb 12.8 oz)  Body mass index is 29.16 kg/m².    Intake/Output Summary (Last 24 hours) at 11/17/2018 1358  Last data filed at 11/17/2018 0900  Gross per 24 hour   Intake 240 ml   Output 1810 ml   Net -1570 ml      Physical Exam   Constitutional: He is oriented to person, place, and time. He appears well-developed. No distress.   HENT:   Head: Normocephalic.   Eyes: EOM are  normal. Pupils are equal, round, and reactive to light.   Neck: Normal range of motion. Neck supple. No JVD present. No thyromegaly present.   Cardiovascular: Normal rate, regular rhythm, S1 normal, S2 normal, normal heart sounds and intact distal pulses. PMI is not displaced. Exam reveals no gallop and no friction rub.   No murmur heard.  Pulmonary/Chest: Effort normal and breath sounds normal. No respiratory distress. He has no wheezes. He has no rales. He exhibits no tenderness.   Abdominal: Soft. He exhibits no distension and no mass. There is no hepatosplenomegaly. There is no tenderness. There is no rebound and no CVA tenderness. No hernia.    ileostomy in place, wound VAC  LLQ, KINDRA drain with purulent drainage   Genitourinary:   Genitourinary Comments: Guzman in place with kelton urine   Musculoskeletal: Normal range of motion. He exhibits no edema or tenderness.   Lymphadenopathy:     He has no cervical adenopathy.   Neurological: He is alert and oriented to person, place, and time. He has normal reflexes. He is not disoriented. He displays normal reflexes. No cranial nerve deficit. He exhibits normal muscle tone. Coordination normal.   Skin: Skin is warm and dry. Capillary refill takes less than 2 seconds. No rash noted. No erythema.   Psychiatric: He has a normal mood and affect. His behavior is normal. Judgment and thought content normal.   Nursing note and vitals reviewed.      Significant Labs:   BMP:   Recent Labs   Lab 11/17/18  0442      *   K 4.0      CO2 17*   BUN 75*   CREATININE 7.2*   CALCIUM 8.4*     POCT Glucose:   Recent Labs   Lab 11/16/18  2317 11/17/18  0549 11/17/18  1135   POCTGLUCOSE 127* 104 130*       Significant Imaging:      negative

## 2018-11-17 NOTE — SUBJECTIVE & OBJECTIVE
Review of Systems   Constitution: Positive for weakness and malaise/fatigue. Negative for diaphoresis, weight gain and weight loss.   HENT: Negative for congestion and nosebleeds.    Cardiovascular: Negative for chest pain, claudication, cyanosis, dyspnea on exertion, irregular heartbeat, leg swelling, near-syncope, orthopnea, palpitations, paroxysmal nocturnal dyspnea and syncope.   Respiratory: Positive for shortness of breath (chronic ). Negative for cough, hemoptysis, sleep disturbances due to breathing, snoring, sputum production and wheezing.    Hematologic/Lymphatic: Negative for bleeding problem. Does not bruise/bleed easily.   Skin: Negative for rash.   Musculoskeletal: Negative for arthritis, back pain, falls, joint pain, muscle cramps and muscle weakness.   Gastrointestinal: Positive for bloating. Negative for abdominal pain, constipation, diarrhea, heartburn, hematemesis, hematochezia, melena and nausea.        Ileostomy    Genitourinary: Negative for dysuria, hematuria and nocturia.   Neurological: Negative for excessive daytime sleepiness, dizziness, headaches, light-headedness, loss of balance, numbness and vertigo.     Objective:     Vital Signs (Most Recent):  Temp: 98.5 °F (36.9 °C) (11/17/18 0749)  Pulse: 80 (11/17/18 0749)  Resp: 18 (11/17/18 0749)  BP: 106/60 (11/17/18 0913)  SpO2: 97 % (11/17/18 0749) Vital Signs (24h Range):  Temp:  [98.1 °F (36.7 °C)-98.5 °F (36.9 °C)] 98.5 °F (36.9 °C)  Pulse:  [67-83] 80  Resp:  [14-18] 18  SpO2:  [96 %-98 %] 97 %  BP: ()/(50-60) 106/60     Weight: 87 kg (191 lb 12.8 oz)  Body mass index is 29.16 kg/m².     SpO2: 97 %  O2 Device (Oxygen Therapy): room air      Intake/Output Summary (Last 24 hours) at 11/17/2018 1126  Last data filed at 11/17/2018 0900  Gross per 24 hour   Intake 240 ml   Output 1810 ml   Net -1570 ml       Lines/Drains/Airways     Drain                 Ileostomy 10/30/18 Loop RUQ 18 days         Closed/Suction Drain 10/30/18 1300  Abdomen Bulb 17 days         Urethral Catheter 11/15/18 1700 Double-lumen 16 Fr. 1 day          Pressure Ulcer                 Negative Pressure Wound Therapy  16 days          Peripheral Intravenous Line                 Peripheral IV - Single Lumen 11/16/18 1235 Right;Posterior Wrist less than 1 day                Physical Exam   Constitutional: He is oriented to person, place, and time. He appears well-developed and well-nourished.   Neck: Neck supple. No JVD present.   Cardiovascular: Normal rate, regular rhythm, normal heart sounds and normal pulses. Exam reveals no friction rub.   No murmur heard.  Pulmonary/Chest: Effort normal and breath sounds normal. No respiratory distress. He has no wheezes. He has no rales.   Abdominal: Soft. Bowel sounds are normal. He exhibits no distension.   ileostomy in place, wound VAC on the left lower quadrant   KINDRA drain with purulent drainage      Musculoskeletal: He exhibits no edema or tenderness.   Neurological: He is alert and oriented to person, place, and time.   Skin: Skin is warm and dry. No rash noted.   Psychiatric: He has a normal mood and affect. His behavior is normal.   Nursing note and vitals reviewed.      Significant Labs:   All pertinent lab results from the last 24 hours have been reviewed. and   Recent Lab Results       11/17/18  0549   11/17/18  0442   11/16/18  2317        Albumin   2.3       Anion Gap   13       BUN, Bld   75       Calcium   8.4       Chloride   102       CO2   17       Creatinine   7.2       eGFR if    8       eGFR if non    7  Comment:  Calculation used to obtain the estimated glomerular filtration  rate (eGFR) is the CKD-EPI equation.          Glucose   100       Phosphorus   6.4       POCT Glucose 104   127     Potassium   4.0       Sodium   132             Significant Imaging: Echocardiogram:   2D echo with color flow doppler:   Results for orders placed or performed in visit on 10/01/18   2D echo with  color flow doppler   Result Value Ref Range    QEF 30 (A) 55 - 65    Diastolic Dysfunction Yes (A)     Aortic Valve Regurgitation MILD TO MODERATE (A)     Est. PA Systolic Pressure 35.49     Tricuspid Valve Regurgitation MILD

## 2018-11-17 NOTE — ASSESSMENT & PLAN NOTE
-Currently compensated  -Monitor hydration status  -Continue bb with holding parameters  -Antiplatelet/anticoag hold as may need surgical intervetion

## 2018-11-17 NOTE — ASSESSMENT & PLAN NOTE
-Creatine currently 7.2 with metabolic acidosis. Renal ultrasound with no acute findings.   -Nephrology following; urine studies abnormal and will be managed by Nephrology team  -continue gentle hydration

## 2018-11-17 NOTE — PLAN OF CARE
Problem: Patient Care Overview  Goal: Plan of Care Review  Outcome: Ongoing (interventions implemented as appropriate)  Patient remained free from injury. Wound vac in use. Colostomy bag and wayne maintained. Blood pressure low. IVF and IV abx maintained. No s/s of acute distress. 12hr chart check complete.

## 2018-11-17 NOTE — ASSESSMENT & PLAN NOTE
-General surgery managing  -IV antibiotics with Ciprofloxacin and Flagyl   -no indication for further surgical intervention for now

## 2018-11-17 NOTE — HOSPITAL COURSE
11/17/18- Renal function with no significant improvement today. Creatine down to 7.2 from 8.0 yesterday. Still receiving IVF for dehydration. Has no evidence of volume overload on exam. Is making urine, but is concentrated. KINDRA drain in place with purulent output noted. Ileostomy with liquid output. Wound vac in place.     11/18/18- Creatine improved today. Down to 5.5. Does have decline in H/H. Has no evidence of volume overload on exam. Still receiving IVF.     11/19. Improved Cr and urine output

## 2018-11-17 NOTE — ASSESSMENT & PLAN NOTE
No acute surgical intervention at this time  Continue KINDRA drain to bulb sxn  Will attempt to allow leak to naturally heal with fecal diversion with ileostomy

## 2018-11-17 NOTE — PROGRESS NOTES
[facility] -   Nephrology  Progress Note    Patient Name: Yan Choudhury  MRN: [id number]  Admission Date: 11/15/2018  Hospital Length of Stay: 2 days  Attending Provider: Kevin Lindsay MD   Primary Care Physician: Sandra Estevez MD  Principal Problem:Open wound of anterior abdominal wall with complication    Subjective:     HPI: Patient is a 63-year-old male with history of chronic kidney disease followed by our division in the outpatient clinic.  Patient has been seen by Dr. Seth and Dr. Juan Luis Galeas in the past.  Patient has had history of diverticular perforation requiring colostomy.  Patient is status post colostomy reversal as well as repair of paraostomy hernia repair.  Patient was recently hospitalized with dehydration.  Patient also has history of congestive heart failure followed by Dr. Calvillo as an outpatient.  Patient takes metolazone as an outpatient for diuretics.  Patient was seen by Dr. Lindsay as an outpatient today and was admitted for dehydration.  Patient's baseline creatinine ranged between 1.8 and 2.1.  Patient never had any proteinuria.  His creatinine has gone up to 8.2 with a BUN elevation as well as hyponatremia.  Nephrology consultation is requested for acute kidney injury on chronic kidney disease stage 3 4/stage IV.    Interval History:  Urine output has improved.  IV fluids continued.  No CHF by exam.  Creatinine has improved down to 7.2.  No dialysis today    Review of patient's allergies indicates:  No Known Allergies  Current Facility-Administered Medications   Medication Frequency    0.9%  NaCl infusion Continuous    acetaminophen tablet 650 mg Q8H PRN    allopurinol tablet 100 mg Daily    carvedilol tablet 25 mg Daily    ciprofloxacin (CIPRO)400mg/200ml D5W IVPB 400 mg Q12H    dextrose 50% injection 12.5 g PRN    glucagon (human recombinant) injection 1 mg PRN    insulin aspart U-100 pen 1-10 Units Q6H PRN    lidocaine (PF) 10 mg/ml (1%) injection 10 mg Once     metronidazole IVPB 500 mg Q8H    ondansetron injection 4 mg Q8H PRN    pantoprazole EC tablet 40 mg Daily    psyllium husk packet 6 g Daily    simvastatin tablet 10 mg QHS       Objective:     Vital Signs (Most Recent):  Temp: 98.5 °F (36.9 °C) (11/17/18 0749)  Pulse: 80 (11/17/18 0749)  Resp: 18 (11/17/18 0749)  BP: 106/60 (11/17/18 0913)  SpO2: 97 % (11/17/18 0749)  O2 Device (Oxygen Therapy): room air (11/17/18 0749) Vital Signs (24h Range):  Temp:  [98.1 °F (36.7 °C)-98.5 °F (36.9 °C)] 98.5 °F (36.9 °C)  Pulse:  [67-83] 80  Resp:  [14-18] 18  SpO2:  [96 %-98 %] 97 %  BP: ()/(50-60) 106/60     Weight: 87 kg (191 lb 12.8 oz) (11/17/18 0553)  Body mass index is 29.16 kg/m².  Body surface area is 2.04 meters squared.    I/O last 3 completed shifts:  In: 1295 [P.O.:120; I.V.:775; IV Piggyback:400]  Out: 2100 [Urine:630; Drains:245; Other:25; Stool:1200]    Physical Exam   Constitutional: He is oriented to person, place, and time. He appears well-developed. No distress.   Some muscle wasting, signs of dehydration, malnutrition   HENT:   Head: Normocephalic.   Eyes: EOM are normal. Pupils are equal, round, and reactive to light.   Neck: Normal range of motion. Neck supple. No JVD present. No thyromegaly present.   Cardiovascular: Normal rate, regular rhythm, S1 normal, S2 normal, normal heart sounds and intact distal pulses. PMI is not displaced. Exam reveals no gallop and no friction rub.   No murmur heard.  No clinical evidence of acute CHF at this time.   Pulmonary/Chest: Effort normal and breath sounds normal. No respiratory distress. He has no wheezes. He has no rales. He exhibits no tenderness.   Abdominal: He exhibits no distension and no mass. There is no hepatosplenomegaly. There is no tenderness. There is no rebound and no CVA tenderness. No hernia.    ileostomy in place, wound VAC on the left lower quadrant   Musculoskeletal: Normal range of motion. He exhibits no edema or tenderness.    Lymphadenopathy:     He has no cervical adenopathy.   Neurological: He is alert and oriented to person, place, and time. He has normal reflexes. He is not disoriented. He displays normal reflexes. No cranial nerve deficit. He exhibits normal muscle tone. Coordination normal.   Skin: Skin is warm and dry. Capillary refill takes less than 2 seconds. No rash noted. No erythema.   Psychiatric: He has a normal mood and affect. His behavior is normal. Judgment and thought content normal.   Nursing note and vitals reviewed.      Significant Labs:  All labs within the past 24 hours have been reviewed.     Significant Imaging:      Assessment/Plan:     Acute on chronic renal failure     Urine output has improved.  IV fluids continued.  No CHF by exam.  Creatinine has improved down to 7.2.  No dialysis today             Thank you for your consult.     Sandoval Damico MD  Nephrology  [facility] -

## 2018-11-18 LAB
ABO GROUP BLD: NORMAL
ALBUMIN SERPL BCP-MCNC: 2.3 G/DL
ANION GAP SERPL CALC-SCNC: 9 MMOL/L
BASOPHILS # BLD AUTO: 0.02 K/UL
BASOPHILS NFR BLD: 0.2 %
BLD GP AB SCN CELLS X3 SERPL QL: NORMAL
BLD PROD TYP BPU: NORMAL
BLOOD UNIT EXPIRATION DATE: NORMAL
BLOOD UNIT TYPE CODE: 6200
BLOOD UNIT TYPE: NORMAL
BUN SERPL-MCNC: 70 MG/DL
CALCIUM SERPL-MCNC: 8.5 MG/DL
CHLORIDE SERPL-SCNC: 103 MMOL/L
CO2 SERPL-SCNC: 18 MMOL/L
CODING SYSTEM: NORMAL
CREAT SERPL-MCNC: 5.5 MG/DL
DIFFERENTIAL METHOD: ABNORMAL
DISPENSE STATUS: NORMAL
EOSINOPHIL # BLD AUTO: 0.2 K/UL
EOSINOPHIL NFR BLD: 2.1 %
ERYTHROCYTE [DISTWIDTH] IN BLOOD BY AUTOMATED COUNT: 15 %
EST. GFR  (AFRICAN AMERICAN): 12 ML/MIN/1.73 M^2
EST. GFR  (NON AFRICAN AMERICAN): 10 ML/MIN/1.73 M^2
GLUCOSE SERPL-MCNC: 98 MG/DL
HCT VFR BLD AUTO: 25.9 %
HGB BLD-MCNC: 8.7 G/DL
LYMPHOCYTES # BLD AUTO: 1.6 K/UL
LYMPHOCYTES NFR BLD: 16.4 %
MAGNESIUM SERPL-MCNC: 1.7 MG/DL
MCH RBC QN AUTO: 27.4 PG
MCHC RBC AUTO-ENTMCNC: 33.6 G/DL
MCV RBC AUTO: 81 FL
MONOCYTES # BLD AUTO: 1.4 K/UL
MONOCYTES NFR BLD: 14.4 %
NEUTROPHILS # BLD AUTO: 6.7 K/UL
NEUTROPHILS NFR BLD: 66.9 %
PHOSPHATE SERPL-MCNC: 5.5 MG/DL
PHOSPHATE SERPL-MCNC: 5.5 MG/DL
PLATELET # BLD AUTO: 299 K/UL
PMV BLD AUTO: 10.1 FL
POCT GLUCOSE: 128 MG/DL (ref 70–110)
POCT GLUCOSE: 132 MG/DL (ref 70–110)
POCT GLUCOSE: 94 MG/DL (ref 70–110)
POTASSIUM SERPL-SCNC: 4.2 MMOL/L
RBC # BLD AUTO: 3.18 M/UL
RH BLD: NORMAL
SODIUM SERPL-SCNC: 130 MMOL/L
TRANS ERYTHROCYTES VOL PATIENT: NORMAL ML
WBC # BLD AUTO: 9.94 K/UL

## 2018-11-18 PROCEDURE — 86901 BLOOD TYPING SEROLOGIC RH(D): CPT

## 2018-11-18 PROCEDURE — 25000003 PHARM REV CODE 250: Performed by: SURGERY

## 2018-11-18 PROCEDURE — 80069 RENAL FUNCTION PANEL: CPT

## 2018-11-18 PROCEDURE — 25000003 PHARM REV CODE 250: Performed by: COLON & RECTAL SURGERY

## 2018-11-18 PROCEDURE — 99232 SBSQ HOSP IP/OBS MODERATE 35: CPT | Mod: ,,, | Performed by: INTERNAL MEDICINE

## 2018-11-18 PROCEDURE — 86850 RBC ANTIBODY SCREEN: CPT

## 2018-11-18 PROCEDURE — 25000003 PHARM REV CODE 250: Performed by: NURSE PRACTITIONER

## 2018-11-18 PROCEDURE — 63600175 PHARM REV CODE 636 W HCPCS: Performed by: NURSE PRACTITIONER

## 2018-11-18 PROCEDURE — 86922 COMPATIBILITY TEST ANTIGLOB: CPT

## 2018-11-18 PROCEDURE — 36430 TRANSFUSION BLD/BLD COMPNT: CPT

## 2018-11-18 PROCEDURE — 99024 POSTOP FOLLOW-UP VISIT: CPT | Mod: ,,, | Performed by: COLON & RECTAL SURGERY

## 2018-11-18 PROCEDURE — 83735 ASSAY OF MAGNESIUM: CPT

## 2018-11-18 PROCEDURE — S0030 INJECTION, METRONIDAZOLE: HCPCS | Performed by: SURGERY

## 2018-11-18 PROCEDURE — 86900 BLOOD TYPING SEROLOGIC ABO: CPT

## 2018-11-18 PROCEDURE — P9021 RED BLOOD CELLS UNIT: HCPCS

## 2018-11-18 PROCEDURE — 21400001 HC TELEMETRY ROOM

## 2018-11-18 PROCEDURE — 36415 COLL VENOUS BLD VENIPUNCTURE: CPT

## 2018-11-18 PROCEDURE — 85025 COMPLETE CBC W/AUTO DIFF WBC: CPT

## 2018-11-18 RX ORDER — INSULIN ASPART 100 [IU]/ML
1-10 INJECTION, SOLUTION INTRAVENOUS; SUBCUTANEOUS
Status: DISCONTINUED | OUTPATIENT
Start: 2018-11-18 | End: 2018-11-18 | Stop reason: SDUPTHER

## 2018-11-18 RX ORDER — CIPROFLOXACIN 2 MG/ML
400 INJECTION, SOLUTION INTRAVENOUS
Status: DISCONTINUED | OUTPATIENT
Start: 2018-11-19 | End: 2018-11-19

## 2018-11-18 RX ORDER — IBUPROFEN 200 MG
24 TABLET ORAL
Status: DISCONTINUED | OUTPATIENT
Start: 2018-11-18 | End: 2018-11-21 | Stop reason: HOSPADM

## 2018-11-18 RX ORDER — INSULIN ASPART 100 [IU]/ML
1-10 INJECTION, SOLUTION INTRAVENOUS; SUBCUTANEOUS
Status: DISCONTINUED | OUTPATIENT
Start: 2018-11-18 | End: 2018-11-21 | Stop reason: HOSPADM

## 2018-11-18 RX ORDER — HYDROCODONE BITARTRATE AND ACETAMINOPHEN 500; 5 MG/1; MG/1
TABLET ORAL
Status: DISCONTINUED | OUTPATIENT
Start: 2018-11-18 | End: 2018-11-21 | Stop reason: HOSPADM

## 2018-11-18 RX ORDER — LOPERAMIDE HYDROCHLORIDE 2 MG/1
2 CAPSULE ORAL 4 TIMES DAILY PRN
Status: DISCONTINUED | OUTPATIENT
Start: 2018-11-18 | End: 2018-11-18

## 2018-11-18 RX ORDER — GLUCAGON 1 MG
1 KIT INJECTION
Status: DISCONTINUED | OUTPATIENT
Start: 2018-11-18 | End: 2018-11-21 | Stop reason: HOSPADM

## 2018-11-18 RX ORDER — IBUPROFEN 200 MG
16 TABLET ORAL
Status: DISCONTINUED | OUTPATIENT
Start: 2018-11-18 | End: 2018-11-21 | Stop reason: HOSPADM

## 2018-11-18 RX ORDER — LOPERAMIDE HYDROCHLORIDE 2 MG/1
2 CAPSULE ORAL
Status: DISCONTINUED | OUTPATIENT
Start: 2018-11-18 | End: 2018-11-21 | Stop reason: HOSPADM

## 2018-11-18 RX ADMIN — SIMVASTATIN 10 MG: 5 TABLET, FILM COATED ORAL at 09:11

## 2018-11-18 RX ADMIN — METRONIDAZOLE 500 MG: 500 INJECTION, SOLUTION INTRAVENOUS at 01:11

## 2018-11-18 RX ADMIN — PSYLLIUM HUSK 6 G: 6 GRANULE ORAL at 09:11

## 2018-11-18 RX ADMIN — LOPERAMIDE HYDROCHLORIDE 2 MG: 2 CAPSULE ORAL at 09:11

## 2018-11-18 RX ADMIN — LOPERAMIDE HYDROCHLORIDE 2 MG: 2 CAPSULE ORAL at 08:11

## 2018-11-18 RX ADMIN — CARVEDILOL 12.5 MG: 12.5 TABLET, FILM COATED ORAL at 09:11

## 2018-11-18 RX ADMIN — LOPERAMIDE HYDROCHLORIDE 2 MG: 2 CAPSULE ORAL at 01:11

## 2018-11-18 RX ADMIN — ALLOPURINOL 100 MG: 100 TABLET ORAL at 09:11

## 2018-11-18 RX ADMIN — PANTOPRAZOLE SODIUM 40 MG: 40 TABLET, DELAYED RELEASE ORAL at 09:11

## 2018-11-18 RX ADMIN — METRONIDAZOLE 500 MG: 500 INJECTION, SOLUTION INTRAVENOUS at 09:11

## 2018-11-18 RX ADMIN — CIPROFLOXACIN 400 MG: 2 INJECTION, SOLUTION INTRAVENOUS at 11:11

## 2018-11-18 RX ADMIN — METRONIDAZOLE 500 MG: 500 INJECTION, SOLUTION INTRAVENOUS at 05:11

## 2018-11-18 RX ADMIN — SODIUM CHLORIDE: 0.9 INJECTION, SOLUTION INTRAVENOUS at 09:11

## 2018-11-18 NOTE — PROGRESS NOTES
[facility] -   Cardiology  Progress Note    Patient Name: Yan Choudhury  MRN: [id number]  Admission Date: 11/15/2018  Hospital Length of Stay: 3 days  Code Status: Full Code   Attending Physician: Kevin Lindsay MD   Primary Care Physician: Sandra Estevez MD  Expected Discharge Date:   Principal Problem:Open wound of anterior abdominal wall with complication    Subjective:   Brief HPI:    Patient is a 63 y.o. male CAD/ICM, DCM, LBBB, HTN, CRI, DM, CRT-ICD,  PTHN, dyslipidemia, obesity. Nonsmoker.   LHC was done 2009 showed diffuse CAD, and severe distal CAD involving apical LAD, distal RCA/distal LCX.   Echo Oct 2010 showed LVEF 25%.   PET stress 2016 showed inferior scar, no significant ischemia noted and LVEF < 35%. Is s/p BIV ICD 3/16 for CHF/LBBB.  Echo 12/17 EF 20%, DD, mod PHTN and unchanged in Oct 2018.  Patient presented to [facility] as a direct admit from general surgery office. Is post colostomy reversal, repair of parastomal hernia and protective ileostomy. Had reports of purulent material from the drain and complained of fatigue. Labs checked as an OP and noted to have BROOKE. Baseline creatine around 1.8- 2, but creatine of 8.2 on admit. Patient takes diuretics as an OP and sometimes adjusts dose on his own. Has been treated with abx and IVF. . CXR shows no acute process.     Hospital Course:   11/17/18- Renal function with no significant improvement today. Creatine down to 7.2 from 8.0 yesterday. Still receiving IVF for dehydration. Has no evidence of volume overload on exam. Is making urine, but is concentrated. KINDRA drain in place with purulent output noted. Ileostomy with liquid output. Wound vac in place.     11/18/18- Creatine improved today. Down to 5.5. Does have decline in H/H. Has no evidence of volume overload on exam. Still receiving IVF.         Review of Systems   Constitution: Positive for weakness and malaise/fatigue. Negative for diaphoresis, weight gain and weight loss.   HENT:  Negative for congestion and nosebleeds.    Cardiovascular: Negative for chest pain, claudication, cyanosis, dyspnea on exertion, irregular heartbeat, leg swelling, near-syncope, orthopnea, palpitations, paroxysmal nocturnal dyspnea and syncope.   Respiratory: Positive for shortness of breath (chronic ). Negative for cough, hemoptysis, sleep disturbances due to breathing, snoring, sputum production and wheezing.    Hematologic/Lymphatic: Negative for bleeding problem. Does not bruise/bleed easily.   Skin: Negative for rash.   Musculoskeletal: Negative for arthritis, back pain, falls, joint pain, muscle cramps and muscle weakness.   Gastrointestinal: Positive for bloating. Negative for abdominal pain, constipation, diarrhea, heartburn, hematemesis, hematochezia, melena and nausea.        Ileostomy    Genitourinary: Negative for dysuria, hematuria and nocturia.   Neurological: Negative for excessive daytime sleepiness, dizziness, headaches, light-headedness, loss of balance, numbness and vertigo.     Objective:     Vital Signs (Most Recent):  Temp: 97.9 °F (36.6 °C) (11/18/18 1116)  Pulse: 76 (11/18/18 1116)  Resp: 18 (11/18/18 1116)  BP: 120/63 (11/18/18 1116)  SpO2: 99 % (11/18/18 1116) Vital Signs (24h Range):  Temp:  [97.8 °F (36.6 °C)-98.8 °F (37.1 °C)] 97.9 °F (36.6 °C)  Pulse:  [63-77] 76  Resp:  [16-20] 18  SpO2:  [98 %-99 %] 99 %  BP: ()/(55-63) 120/63     Weight: 87.4 kg (192 lb 10.9 oz)  Body mass index is 29.3 kg/m².     SpO2: 99 %  O2 Device (Oxygen Therapy): room air      Intake/Output Summary (Last 24 hours) at 11/18/2018 1241  Last data filed at 11/18/2018 1113  Gross per 24 hour   Intake 1600 ml   Output 4150 ml   Net -2550 ml       Lines/Drains/Airways     Drain                 Closed/Suction Drain 10/30/18 1300 Abdomen Bulb 19 days         Ileostomy 10/30/18 Loop RUQ 19 days         Urethral Catheter 11/15/18 1700 Double-lumen 16 Fr. 2 days          Pressure Ulcer                 Negative  Pressure Wound Therapy  17 days          Peripheral Intravenous Line                 Peripheral IV - Single Lumen 11/16/18 1235 Right;Posterior Wrist 2 days                Physical Exam   Constitutional: He is oriented to person, place, and time. He appears well-developed and well-nourished.   Neck: Neck supple. No JVD present.   Cardiovascular: Normal rate, regular rhythm, normal heart sounds and normal pulses. Exam reveals no friction rub.   No murmur heard.  Pulmonary/Chest: Effort normal and breath sounds normal. No respiratory distress. He has no wheezes. He has no rales.   Abdominal: Soft. Bowel sounds are normal. He exhibits no distension.   ileostomy in place, wound VAC on the left lower quadrant   KINDRA drain with purulent drainage      Musculoskeletal: He exhibits no edema or tenderness.   Neurological: He is alert and oriented to person, place, and time.   Skin: Skin is warm and dry. No rash noted.   Psychiatric: He has a normal mood and affect. His behavior is normal.   Nursing note and vitals reviewed.      Significant Labs:   All pertinent lab results from the last 24 hours have been reviewed. and   Recent Lab Results       11/18/18  1057   11/18/18  0549   11/18/18  0510   11/17/18  2342   11/17/18  1730        Albumin     2.3         Anion Gap     9         Baso #     0.02         Basophil%     0.2         BUN, Bld     70         Calcium     8.5         Chloride     103         CO2     18         Creatinine     5.5  Comment:  Results confirmed, test repeated  Corrected result; previously reported as 5.5 on 11/18/2018 at 07:21.  [C]         Differential Method     Automated         eGFR if      12         eGFR if non      10  Comment:  Calculation used to obtain the estimated glomerular filtration  rate (eGFR) is the CKD-EPI equation.            Eos #     0.2         Eosinophil%     2.1         Glucose     98         Gran # (ANC)     6.7         Gran%     66.9          Hematocrit     25.9         Hemoglobin     8.7         Lymph #     1.6         Lymph%     16.4         Magnesium     1.7         MCH     27.4         MCHC     33.6         MCV     81         Mono #     1.4         Mono%     14.4         MPV     10.1         Phosphorus     5.5              5.5         Platelets     299         POCT Glucose 132 94   133 157     Potassium     4.2         RBC     3.18         RDW     15.0         Sodium     130         WBC     9.94                              Significant Imaging: Echocardiogram:   2D echo with color flow doppler:   Results for orders placed or performed in visit on 10/01/18   2D echo with color flow doppler   Result Value Ref Range    QEF 30 (A) 55 - 65    Diastolic Dysfunction Yes (A)     Aortic Valve Regurgitation MILD TO MODERATE (A)     Est. PA Systolic Pressure 35.49     Tricuspid Valve Regurgitation MILD      Assessment and Plan:       Acute on chronic renal failure    -Likely from over diuresis. Patient needing aggressive IV hydration , but needs close monitoring for fluid overload.   -Will diurese later if needed      Chronic combined systolic and diastolic congestive heart failure    -Patient echo in Oct 2018 shows Moderately depressed left ventricular systolic function (EF 30-35% and RV dysfunction   -Has no evidence of volume overload on exam. Patient appears dry on exam  -Continue IV hydration and will address overload if needed later   -Continue BB  -OK to give Hydralazine if needed for BP control since ACEI DC'd   -Recommend transfusing 1 unit of blood. This has been discussed with general surgery          VTE Risk Mitigation (From admission, onward)        Ordered     Place sequential compression device  Until discontinued      11/15/18 2006     IP VTE HIGH RISK PATIENT  Once      11/15/18 2006        Chart reviewed. Patient examined by Dr. Mercer and agrees with plan that has been outlined.     MANPREET Vivas  Cardiology  [facility]  - [place]

## 2018-11-18 NOTE — PROGRESS NOTES
[facility] Hospital Medicine  Progress Note    Patient Name: Yan Choudhury  MRN: [id number]  Patient Class: IP- Inpatient   Admission Date: 11/15/2018  Length of Stay: 3 days  Attending Physician: Kevin Lindsay MD  Primary Care Provider: Sandra Estevez MD        Subjective:     Principal Problem:Open wound of anterior abdominal wall with complication    HPI:  Patient is a 63 y.o. male presents after a colostomy reversal, repair of parastomal hernia and protective ileostomy with complaint of purulent material from the drain. Patient was direct admitted by General Surgery. Renal was consulted for Acute on chronic kidney injury. Renal Note reviewed and plans for gentle hydration overnight. HM consulted for assistance with medical management. Patient seen and examined in his room. Patient with no current complaints other than purulent drain discharge. No modifying factors, no associated symptoms. Disucssed case with Dr. Lindsay patient is to receive both flagyl and cipro and CT ABD pending. Significant medical conditions include CAD, CHF, HTN, DM2.          Hospital Course:  11/16/18 creatinine still 8. Nephrology and Cardiology assisting with management. Renal ultrasound pending. No surgical intervention indicated at present. As of 11/17 creatinine has improved from 8.0 to 7.2. No HD to be performed at this time. Renal US showed minimal increased cortical echogenicity right kidney. Hypotensive episodes overnight and systolic now 97. Pt currently taking Coreg 25 mg PO daily. Will split the dose of Coreg to 12.5 mg PO BID with holding parameters. Blood sugars controlled. As of 11/18 pt was transferred to Telemetry last night per Cardiology recommendations. Creatinine improving, currently 5.5 from 7.2 with gentle IV hydration. Leukocytosis resolved. Remains afebrile. Hx of CHF, appears compensated at this time. DM controlled.     Interval History: Pt seen and examined. Pt has no complaints at this time.  "Pt reports "I feel better."     Review of Systems   Constitutional: Negative for chills, diaphoresis, fatigue and fever.   HENT: Negative for congestion, sore throat and voice change.    Eyes: Negative for photophobia and visual disturbance.   Respiratory: Negative for cough, shortness of breath, wheezing and stridor.    Cardiovascular: Negative for chest pain and leg swelling.   Gastrointestinal: Negative for abdominal distention, abdominal pain, constipation, diarrhea, nausea and vomiting.        S/p abdominal surgery     Endocrine: Negative for polydipsia, polyphagia and polyuria.   Genitourinary: Negative for difficulty urinating, dysuria, flank pain, testicular pain and urgency.   Musculoskeletal: Negative for back pain, joint swelling, neck pain and neck stiffness.   Skin: Positive for wound. Negative for color change and rash.   Allergic/Immunologic: Negative for immunocompromised state.   Neurological: Negative for dizziness, syncope, weakness, numbness and headaches.   Hematological: Does not bruise/bleed easily.   Psychiatric/Behavioral: Negative for agitation, behavioral problems and confusion.     Objective:     Vital Signs (Most Recent):  Temp: 97.9 °F (36.6 °C) (11/18/18 1116)  Pulse: 76 (11/18/18 1116)  Resp: 18 (11/18/18 1116)  BP: 120/63 (11/18/18 1116)  SpO2: 99 % (11/18/18 1116) Vital Signs (24h Range):  Temp:  [97.8 °F (36.6 °C)-99 °F (37.2 °C)] 97.9 °F (36.6 °C)  Pulse:  [63-77] 76  Resp:  [16-20] 18  SpO2:  [95 %-99 %] 99 %  BP: ()/(55-63) 120/63     Weight: 87.4 kg (192 lb 10.9 oz)  Body mass index is 29.3 kg/m².    Intake/Output Summary (Last 24 hours) at 11/18/2018 1135  Last data filed at 11/18/2018 1113  Gross per 24 hour   Intake 1600 ml   Output 4150 ml   Net -2550 ml      Physical Exam   Constitutional: He is oriented to person, place, and time. He appears well-developed. No distress.   HENT:   Head: Normocephalic.   Eyes: EOM are normal. Pupils are equal, round, and reactive to " light.   Neck: Normal range of motion. Neck supple. No JVD present. No thyromegaly present.   Cardiovascular: Normal rate, regular rhythm, S1 normal, S2 normal, normal heart sounds and intact distal pulses. PMI is not displaced. Exam reveals no gallop and no friction rub.   No murmur heard.  Pulmonary/Chest: Effort normal and breath sounds normal. No respiratory distress. He has no wheezes. He has no rales. He exhibits no tenderness.   Abdominal: Soft. He exhibits no distension and no mass. There is no hepatosplenomegaly. There is no tenderness. There is no rebound and no CVA tenderness. No hernia.    ileostomy in place, wound VAC  LLQ, KINDRA drain with purulent drainage   Genitourinary:   Genitourinary Comments: Guzman in place with kelton urine   Musculoskeletal: Normal range of motion. He exhibits no edema or tenderness.   Lymphadenopathy:     He has no cervical adenopathy.   Neurological: He is alert and oriented to person, place, and time. He has normal reflexes. He is not disoriented. He displays normal reflexes. No cranial nerve deficit. He exhibits normal muscle tone. Coordination normal.   Skin: Skin is warm and dry. Capillary refill takes less than 2 seconds. No rash noted. No erythema.   Psychiatric: He has a normal mood and affect. His behavior is normal. Judgment and thought content normal.   Nursing note and vitals reviewed.      Significant Labs:   BMP:   Recent Labs   Lab 11/18/18  0510   GLU 98   *   K 4.2      CO2 18*   BUN 70*   CREATININE 5.5*   CALCIUM 8.5*   MG 1.7     CBC:   Recent Labs   Lab 11/18/18  0510   WBC 9.94   HGB 8.7*   HCT 25.9*        Magnesium:   Recent Labs   Lab 11/18/18  0510   MG 1.7     POCT Glucose:   Recent Labs   Lab 11/17/18  2342 11/18/18  0549 11/18/18  1057   POCTGLUCOSE 133* 94 132*       Significant Imaging:       Assessment/Plan:      * Open wound of anterior abdominal wall with complication    -General surgery managing  -IV antibiotics with  Ciprofloxacin and Flagyl   -No indication for further surgical intervention for now     Gastrointestinal anastomotic leak    -General Surgery managing  -KINDRA drain in place       Acute on chronic renal failure    -Creatine currently 5.5 with metabolic acidosis. Renal ultrasound with no acute findings.   -Nephrology following; urine studies abnormal and will be managed by Nephrology team  -Continue gentle hydration       Chronic combined systolic and diastolic congestive heart failure    -Currently compensated  -Monitor hydration status  -Continue BB with holding parameters  -Antiplatelet/anticoag hold as may need surgical intervetion           Ischemic cardiomyopathy    -Cardiology following  -He does not appear to be volume overloaded at this time  -Continue cardiac medications     Hypertension associated with diabetes    - Glucose stable. Continue accuchecks and moderate dose SSI  - Documented hypotensive pressures. BP improving. Will continue holding parameters on BB and monitor            VTE Risk Mitigation (From admission, onward)        Ordered     Place sequential compression device  Until discontinued      11/15/18 2006     IP VTE HIGH RISK PATIENT  Once      11/15/18 2006              MANPREET Fuentes  Department of Hospital Medicine   [facility]

## 2018-11-18 NOTE — PROGRESS NOTES
[facility]   Nephrology  Progress Note    Patient Name: Yan Choudhury  MRN: [id number]  Admission Date: 11/15/2018  Hospital Length of Stay: 3 days  Attending Provider: Kevin Lindsay MD   Primary Care Physician: Sandra Estevez MD  Principal Problem:Open wound of anterior abdominal wall with complication    Subjective:     HPI: Patient is a 63-year-old male with history of chronic kidney disease followed by our division in the outpatient clinic.  Patient has been seen by Dr. Seth and Dr. Juan Luis Galeas in the past.  Patient has had history of diverticular perforation requiring colostomy.  Patient is status post colostomy reversal as well as repair of paraostomy hernia repair.  Patient was recently hospitalized with dehydration.  Patient also has history of congestive heart failure followed by Dr. Calvillo as an outpatient.  Patient takes metolazone as an outpatient for diuretics.  Patient was seen by Dr. Lindsay as an outpatient today and was admitted for dehydration.  Patient's baseline creatinine ranged between 1.8 and 2.1.  Patient never had any proteinuria.  His creatinine has gone up to 8.2 with a BUN elevation as well as hyponatremia.  Nephrology consultation is requested for acute kidney injury on chronic kidney disease stage 3 4/stage IV.    Interval History:  Acute kidney injury much improved.  Patient is not uremic.  No renal replacement therapy.  Continue with IV fluid.  No CHF today.  Appetite is has improved.  Urine output has improved.    Review of patient's allergies indicates:  No Known Allergies  Current Facility-Administered Medications   Medication Frequency    0.9%  NaCl infusion (for blood administration) Q24H PRN    0.9%  NaCl infusion Continuous    acetaminophen tablet 650 mg Q8H PRN    allopurinol tablet 100 mg Daily    carvedilol tablet 12.5 mg BID    ciprofloxacin (CIPRO)400mg/200ml D5W IVPB 400 mg Q12H    dextrose 50% injection 12.5 g PRN    glucagon (human recombinant)  injection 1 mg PRN    insulin aspart U-100 pen 1-10 Units Q6H PRN    lidocaine (PF) 10 mg/ml (1%) injection 10 mg Once    loperamide capsule 2 mg QID (PC + HS)    metronidazole IVPB 500 mg Q8H    ondansetron injection 4 mg Q8H PRN    pantoprazole EC tablet 40 mg Daily    psyllium husk packet 6 g Daily    simvastatin tablet 10 mg QHS       Objective:     Vital Signs (Most Recent):  Temp: 97.9 °F (36.6 °C) (11/18/18 1116)  Pulse: 76 (11/18/18 1116)  Resp: 18 (11/18/18 1116)  BP: 120/63 (11/18/18 1116)  SpO2: 99 % (11/18/18 1116)  O2 Device (Oxygen Therapy): room air (11/18/18 0803) Vital Signs (24h Range):  Temp:  [97.8 °F (36.6 °C)-99 °F (37.2 °C)] 97.9 °F (36.6 °C)  Pulse:  [63-77] 76  Resp:  [16-20] 18  SpO2:  [95 %-99 %] 99 %  BP: ()/(55-63) 120/63     Weight: 87.4 kg (192 lb 10.9 oz) (11/18/18 0600)  Body mass index is 29.3 kg/m².  Body surface area is 2.05 meters squared.    I/O last 3 completed shifts:  In: 1840 [P.O.:240; I.V.:1200; IV Piggyback:400]  Out: 4050 [Urine:1125; Drains:125; Other:25; Stool:2775]    Physical Exam   Constitutional: He is oriented to person, place, and time. He appears well-developed. No distress.   HENT:   Head: Normocephalic.   Eyes: EOM are normal. Pupils are equal, round, and reactive to light.   Neck: Normal range of motion. Neck supple. No JVD present. No thyromegaly present.   Cardiovascular: Normal rate, regular rhythm, S1 normal, S2 normal, normal heart sounds and intact distal pulses. PMI is not displaced. Exam reveals no gallop and no friction rub.   No murmur heard.  Pulmonary/Chest: Effort normal and breath sounds normal. No respiratory distress. He has no wheezes. He has no rales. He exhibits no tenderness.   Abdominal: Soft. He exhibits no distension and no mass. There is no hepatosplenomegaly. There is no tenderness. There is no rebound and no CVA tenderness. No hernia.    ileostomy in place, wound VAC  LLQ, KINDRA drain with purulent drainage    Genitourinary:   Genitourinary Comments: Guzman in place with kelton urine   Musculoskeletal: Normal range of motion. He exhibits no edema or tenderness.   Lymphadenopathy:     He has no cervical adenopathy.   Neurological: He is alert and oriented to person, place, and time. He has normal reflexes. He is not disoriented. He displays normal reflexes. No cranial nerve deficit. He exhibits normal muscle tone. Coordination normal.   Skin: Skin is warm and dry. Capillary refill takes less than 2 seconds. No rash noted. No erythema.   Psychiatric: He has a normal mood and affect. His behavior is normal. Judgment and thought content normal.   Nursing note and vitals reviewed.      Significant Labs:  All labs within the past 24 hours have been reviewed.     Significant Imaging:      Assessment/Plan:     Acute on chronic renal failure     Urine output has improved.  IV fluids continued.  No CHF by exam.  Creatinine has improved.  Infection under control.  No renal replacement therapy is needed at this point         Thank you for your consult.     Sandoval Damico MD  Nephrology  [facility]

## 2018-11-18 NOTE — ASSESSMENT & PLAN NOTE
-Creatine currently 5.5 with metabolic acidosis. Renal ultrasound with no acute findings.   -Nephrology following; urine studies abnormal and will be managed by Nephrology team  -Continue gentle hydration

## 2018-11-18 NOTE — SUBJECTIVE & OBJECTIVE
"Interval History: Pt seen and examined. Pt has no complaints at this time. Pt reports "I feel better."     Review of Systems   Constitutional: Negative for chills, diaphoresis, fatigue and fever.   HENT: Negative for congestion, sore throat and voice change.    Eyes: Negative for photophobia and visual disturbance.   Respiratory: Negative for cough, shortness of breath, wheezing and stridor.    Cardiovascular: Negative for chest pain and leg swelling.   Gastrointestinal: Negative for abdominal distention, abdominal pain, constipation, diarrhea, nausea and vomiting.        S/p abdominal surgery     Endocrine: Negative for polydipsia, polyphagia and polyuria.   Genitourinary: Negative for difficulty urinating, dysuria, flank pain, testicular pain and urgency.   Musculoskeletal: Negative for back pain, joint swelling, neck pain and neck stiffness.   Skin: Positive for wound. Negative for color change and rash.   Allergic/Immunologic: Negative for immunocompromised state.   Neurological: Negative for dizziness, syncope, weakness, numbness and headaches.   Hematological: Does not bruise/bleed easily.   Psychiatric/Behavioral: Negative for agitation, behavioral problems and confusion.     Objective:     Vital Signs (Most Recent):  Temp: 97.9 °F (36.6 °C) (11/18/18 1116)  Pulse: 76 (11/18/18 1116)  Resp: 18 (11/18/18 1116)  BP: 120/63 (11/18/18 1116)  SpO2: 99 % (11/18/18 1116) Vital Signs (24h Range):  Temp:  [97.8 °F (36.6 °C)-99 °F (37.2 °C)] 97.9 °F (36.6 °C)  Pulse:  [63-77] 76  Resp:  [16-20] 18  SpO2:  [95 %-99 %] 99 %  BP: ()/(55-63) 120/63     Weight: 87.4 kg (192 lb 10.9 oz)  Body mass index is 29.3 kg/m².    Intake/Output Summary (Last 24 hours) at 11/18/2018 1135  Last data filed at 11/18/2018 1113  Gross per 24 hour   Intake 1600 ml   Output 4150 ml   Net -2550 ml      Physical Exam   Constitutional: He is oriented to person, place, and time. He appears well-developed. No distress.   HENT:   Head: " Normocephalic.   Eyes: EOM are normal. Pupils are equal, round, and reactive to light.   Neck: Normal range of motion. Neck supple. No JVD present. No thyromegaly present.   Cardiovascular: Normal rate, regular rhythm, S1 normal, S2 normal, normal heart sounds and intact distal pulses. PMI is not displaced. Exam reveals no gallop and no friction rub.   No murmur heard.  Pulmonary/Chest: Effort normal and breath sounds normal. No respiratory distress. He has no wheezes. He has no rales. He exhibits no tenderness.   Abdominal: Soft. He exhibits no distension and no mass. There is no hepatosplenomegaly. There is no tenderness. There is no rebound and no CVA tenderness. No hernia.    ileostomy in place, wound VAC  LLQ, KINDRA drain with purulent drainage   Genitourinary:   Genitourinary Comments: Guzman in place with kelton urine   Musculoskeletal: Normal range of motion. He exhibits no edema or tenderness.   Lymphadenopathy:     He has no cervical adenopathy.   Neurological: He is alert and oriented to person, place, and time. He has normal reflexes. He is not disoriented. He displays normal reflexes. No cranial nerve deficit. He exhibits normal muscle tone. Coordination normal.   Skin: Skin is warm and dry. Capillary refill takes less than 2 seconds. No rash noted. No erythema.   Psychiatric: He has a normal mood and affect. His behavior is normal. Judgment and thought content normal.   Nursing note and vitals reviewed.      Significant Labs:   BMP:   Recent Labs   Lab 11/18/18  0510   GLU 98   *   K 4.2      CO2 18*   BUN 70*   CREATININE 5.5*   CALCIUM 8.5*   MG 1.7     CBC:   Recent Labs   Lab 11/18/18  0510   WBC 9.94   HGB 8.7*   HCT 25.9*        Magnesium:   Recent Labs   Lab 11/18/18  0510   MG 1.7     POCT Glucose:   Recent Labs   Lab 11/17/18  2342 11/18/18  0549 11/18/18  1057   POCTGLUCOSE 133* 94 132*       Significant Imaging:

## 2018-11-18 NOTE — ASSESSMENT & PLAN NOTE
Cardiology consult for further management  Will transfuse 1unit pRBC per cardiology recommendations today

## 2018-11-18 NOTE — ASSESSMENT & PLAN NOTE
- Glucose stable. Continue accuchecks and moderate dose SSI  - Documented hypotensive pressures. BP improving. Will continue holding parameters on BB and monitor

## 2018-11-18 NOTE — SUBJECTIVE & OBJECTIVE
Interval History:  Transferred to telemetry bed yesterday per Cardiology recommendation.  Increasing thin bilious ostomy output that greater than 2 L. creatinine improving.  KINDRA drain still with purulent drainage. States he feels better.    Medications:  Continuous Infusions:   sodium chloride 0.9% 100 mL/hr at 11/18/18 0926     Scheduled Meds:   allopurinol  100 mg Oral Daily    carvedilol  12.5 mg Oral BID    ciprofloxacin (CIPRO)400mg/200ml D5W IVPB  400 mg Intravenous Q12H    lidocaine (PF) 10 mg/ml (1%)  1 mL Other Once    metronidazole  500 mg Intravenous Q8H    pantoprazole  40 mg Oral Daily    psyllium husk  1 packet Oral Daily    simvastatin  10 mg Oral QHS     PRN Meds:sodium chloride, acetaminophen, dextrose 50%, glucagon (human recombinant), insulin aspart U-100, loperamide, ondansetron     Review of patient's allergies indicates:  No Known Allergies  Objective:     Vital Signs (Most Recent):  Temp: 98 °F (36.7 °C) (11/18/18 0803)  Pulse: 77 (11/18/18 0929)  Resp: 18 (11/18/18 0803)  BP: 112/61 (11/18/18 0803)  SpO2: 98 % (11/18/18 0803) Vital Signs (24h Range):  Temp:  [97.8 °F (36.6 °C)-99 °F (37.2 °C)] 98 °F (36.7 °C)  Pulse:  [63-77] 77  Resp:  [16-20] 18  SpO2:  [95 %-98 %] 98 %  BP: ()/(55-61) 112/61     Weight: 87.4 kg (192 lb 10.9 oz)  Body mass index is 29.3 kg/m².    Intake/Output - Last 3 Shifts       11/16 0700 - 11/17 0659 11/17 0700 - 11/18 0659 11/18 0700 - 11/19 0659    P.O. 120 240     I.V. (mL/kg)  1200 (13.7)     IV Piggyback  400     Total Intake(mL/kg) 120 (1.4) 1840 (21.1)     Urine (mL/kg/hr) 425 (0.2) 900 (0.4)     Drains 110 75     Other 25 0     Stool 850 2125 450    Total Output 1410 3100 450    Net -1290 -1260 -450                 Physical Exam   Constitutional: He is oriented to person, place, and time. He appears well-developed.   HENT:   Head: Normocephalic and atraumatic.   Neck: Normal range of motion. No thyromegaly present.   Cardiovascular: Normal rate  and regular rhythm.   Pulmonary/Chest: Effort normal. No respiratory distress.   Abdominal:   Soft, nondistended, nontender; incision c/d/i; ileostomy pink and viable with thin bilious stool in bag; KINDRA drain in place with purulent drainage in bulb; wound vac in place with good seal/sxn   Musculoskeletal: Normal range of motion. He exhibits no edema or tenderness.   Neurological: He is alert and oriented to person, place, and time.   Skin: Skin is warm and dry. Capillary refill takes less than 2 seconds. No rash noted.   Psychiatric: He has a normal mood and affect.       Significant Labs:  CBC:   Recent Labs   Lab 11/18/18  0510   WBC 9.94   RBC 3.18*   HGB 8.7*   HCT 25.9*      MCV 81*   MCH 27.4   MCHC 33.6     BMP:   Recent Labs   Lab 11/18/18  0510   GLU 98   *   K 4.2      CO2 18*   BUN 70*   CREATININE 5.5*   CALCIUM 8.5*   MG 1.7

## 2018-11-18 NOTE — ASSESSMENT & PLAN NOTE
Urine output has improved.  IV fluids continued.  No CHF by exam.  Creatinine has improved.  Infection under control.  No renal replacement therapy is needed at this point

## 2018-11-18 NOTE — SUBJECTIVE & OBJECTIVE
Interval History:  Acute kidney injury much improved.  Patient is not uremic.  No renal replacement therapy.  Continue with IV fluid.  No CHF today.  Appetite is has improved.  Urine output has improved.    Review of patient's allergies indicates:  No Known Allergies  Current Facility-Administered Medications   Medication Frequency    0.9%  NaCl infusion (for blood administration) Q24H PRN    0.9%  NaCl infusion Continuous    acetaminophen tablet 650 mg Q8H PRN    allopurinol tablet 100 mg Daily    carvedilol tablet 12.5 mg BID    ciprofloxacin (CIPRO)400mg/200ml D5W IVPB 400 mg Q12H    dextrose 50% injection 12.5 g PRN    glucagon (human recombinant) injection 1 mg PRN    insulin aspart U-100 pen 1-10 Units Q6H PRN    lidocaine (PF) 10 mg/ml (1%) injection 10 mg Once    loperamide capsule 2 mg QID (PC + HS)    metronidazole IVPB 500 mg Q8H    ondansetron injection 4 mg Q8H PRN    pantoprazole EC tablet 40 mg Daily    psyllium husk packet 6 g Daily    simvastatin tablet 10 mg QHS       Objective:     Vital Signs (Most Recent):  Temp: 97.9 °F (36.6 °C) (11/18/18 1116)  Pulse: 76 (11/18/18 1116)  Resp: 18 (11/18/18 1116)  BP: 120/63 (11/18/18 1116)  SpO2: 99 % (11/18/18 1116)  O2 Device (Oxygen Therapy): room air (11/18/18 0803) Vital Signs (24h Range):  Temp:  [97.8 °F (36.6 °C)-99 °F (37.2 °C)] 97.9 °F (36.6 °C)  Pulse:  [63-77] 76  Resp:  [16-20] 18  SpO2:  [95 %-99 %] 99 %  BP: ()/(55-63) 120/63     Weight: 87.4 kg (192 lb 10.9 oz) (11/18/18 0600)  Body mass index is 29.3 kg/m².  Body surface area is 2.05 meters squared.    I/O last 3 completed shifts:  In: 1840 [P.O.:240; I.V.:1200; IV Piggyback:400]  Out: 4050 [Urine:1125; Drains:125; Other:25; Stool:2775]    Physical Exam   Constitutional: He is oriented to person, place, and time. He appears well-developed. No distress.   HENT:   Head: Normocephalic.   Eyes: EOM are normal. Pupils are equal, round, and reactive to light.   Neck: Normal  range of motion. Neck supple. No JVD present. No thyromegaly present.   Cardiovascular: Normal rate, regular rhythm, S1 normal, S2 normal, normal heart sounds and intact distal pulses. PMI is not displaced. Exam reveals no gallop and no friction rub.   No murmur heard.  Pulmonary/Chest: Effort normal and breath sounds normal. No respiratory distress. He has no wheezes. He has no rales. He exhibits no tenderness.   Abdominal: Soft. He exhibits no distension and no mass. There is no hepatosplenomegaly. There is no tenderness. There is no rebound and no CVA tenderness. No hernia.    ileostomy in place, wound VAC  LLQ, KINDRA drain with purulent drainage   Genitourinary:   Genitourinary Comments: Guzman in place with kelton urine   Musculoskeletal: Normal range of motion. He exhibits no edema or tenderness.   Lymphadenopathy:     He has no cervical adenopathy.   Neurological: He is alert and oriented to person, place, and time. He has normal reflexes. He is not disoriented. He displays normal reflexes. No cranial nerve deficit. He exhibits normal muscle tone. Coordination normal.   Skin: Skin is warm and dry. Capillary refill takes less than 2 seconds. No rash noted. No erythema.   Psychiatric: He has a normal mood and affect. His behavior is normal. Judgment and thought content normal.   Nursing note and vitals reviewed.      Significant Labs:  All labs within the past 24 hours have been reviewed.     Significant Imaging:

## 2018-11-18 NOTE — PROGRESS NOTES
[facility] -   General Surgery  Progress Note    Subjective:     History of Present Illness:  Patient is a 63 y.o. male presents after a colostomy reversal, repair of parastomal hernia and protective ileostomy.     The patient states he is doing okay but he is a bit tired and fatigued he states that purulent material started coming out of his drain about 2 days ago.  He denies any fever or chills.  He states that his ileostomy output is more formed.  The patient was admitted from clinic once we noticed that his BUN and creatinine were significantly elevated        Post-Op Info:  * No surgery found *         Interval History:  Transferred to telemetry bed yesterday per Cardiology recommendation.  Increasing thin bilious ostomy output that greater than 2 L. creatinine improving.  KINDRA drain still with purulent drainage. States he feels better.    Medications:  Continuous Infusions:   sodium chloride 0.9% 100 mL/hr at 11/18/18 0926     Scheduled Meds:   allopurinol  100 mg Oral Daily    carvedilol  12.5 mg Oral BID    ciprofloxacin (CIPRO)400mg/200ml D5W IVPB  400 mg Intravenous Q12H    lidocaine (PF) 10 mg/ml (1%)  1 mL Other Once    metronidazole  500 mg Intravenous Q8H    pantoprazole  40 mg Oral Daily    psyllium husk  1 packet Oral Daily    simvastatin  10 mg Oral QHS     PRN Meds:sodium chloride, acetaminophen, dextrose 50%, glucagon (human recombinant), insulin aspart U-100, loperamide, ondansetron     Review of patient's allergies indicates:  No Known Allergies  Objective:     Vital Signs (Most Recent):  Temp: 98 °F (36.7 °C) (11/18/18 0803)  Pulse: 77 (11/18/18 0929)  Resp: 18 (11/18/18 0803)  BP: 112/61 (11/18/18 0803)  SpO2: 98 % (11/18/18 0803) Vital Signs (24h Range):  Temp:  [97.8 °F (36.6 °C)-99 °F (37.2 °C)] 98 °F (36.7 °C)  Pulse:  [63-77] 77  Resp:  [16-20] 18  SpO2:  [95 %-98 %] 98 %  BP: ()/(55-61) 112/61     Weight: 87.4 kg (192 lb 10.9 oz)  Body mass index is 29.3  kg/m².    Intake/Output - Last 3 Shifts       11/16 0700 - 11/17 0659 11/17 0700 - 11/18 0659 11/18 0700 - 11/19 0659    P.O. 120 240     I.V. (mL/kg)  1200 (13.7)     IV Piggyback  400     Total Intake(mL/kg) 120 (1.4) 1840 (21.1)     Urine (mL/kg/hr) 425 (0.2) 900 (0.4)     Drains 110 75     Other 25 0     Stool 850 2125 450    Total Output 1410 3100 450    Net -1290 -1260 -450                 Physical Exam   Constitutional: He is oriented to person, place, and time. He appears well-developed.   HENT:   Head: Normocephalic and atraumatic.   Neck: Normal range of motion. No thyromegaly present.   Cardiovascular: Normal rate and regular rhythm.   Pulmonary/Chest: Effort normal. No respiratory distress.   Abdominal:   Soft, nondistended, nontender; incision c/d/i; ileostomy pink and viable with thin bilious stool in bag; KINDRA drain in place with purulent drainage in bulb; wound vac in place with good seal/sxn   Musculoskeletal: Normal range of motion. He exhibits no edema or tenderness.   Neurological: He is alert and oriented to person, place, and time.   Skin: Skin is warm and dry. Capillary refill takes less than 2 seconds. No rash noted.   Psychiatric: He has a normal mood and affect.       Significant Labs:  CBC:   Recent Labs   Lab 11/18/18  0510   WBC 9.94   RBC 3.18*   HGB 8.7*   HCT 25.9*      MCV 81*   MCH 27.4   MCHC 33.6     BMP:   Recent Labs   Lab 11/18/18  0510   GLU 98   *   K 4.2      CO2 18*   BUN 70*   CREATININE 5.5*   CALCIUM 8.5*   MG 1.7     Assessment/Plan:     * Open wound of anterior abdominal wall with complication    Wound VAC, wound VAC changes Monday Wednesday and Friday.  Wound care consult      Gastrointestinal anastomotic leak    No acute surgical intervention at this time  Continue KINDRA drain to bulb sxn  Will attempt to allow leak to naturally heal with fecal diversion with ileostomy     Acute on chronic renal failure    Management per Nephrology consult     Ileostomy  in place    Monitor output.   Start Imodium 2mg QID today  Need ostomy education for recording at home     Chronic combined systolic and diastolic congestive heart failure    Cardiology and Hospital Medicine consulted  Transfuse 1unit pRBC today per cards recs     Gastroesophageal reflux disease    Continue PPI      Type 2 diabetes mellitus with stage 4 chronic kidney disease, with long-term current use of insulin    Hospital medicine and nephrology consulted      Ischemic cardiomyopathy    Cardiology consult for further management  Will transfuse 1unit pRBC per cardiology recommendations today     Hypertension associated with diabetes    Hospital Medicine consult for management          Quinn Aragon MD  General Surgery  [facility] -

## 2018-11-18 NOTE — SUBJECTIVE & OBJECTIVE
Review of Systems   Constitution: Positive for weakness and malaise/fatigue. Negative for diaphoresis, weight gain and weight loss.   HENT: Negative for congestion and nosebleeds.    Cardiovascular: Negative for chest pain, claudication, cyanosis, dyspnea on exertion, irregular heartbeat, leg swelling, near-syncope, orthopnea, palpitations, paroxysmal nocturnal dyspnea and syncope.   Respiratory: Positive for shortness of breath (chronic ). Negative for cough, hemoptysis, sleep disturbances due to breathing, snoring, sputum production and wheezing.    Hematologic/Lymphatic: Negative for bleeding problem. Does not bruise/bleed easily.   Skin: Negative for rash.   Musculoskeletal: Negative for arthritis, back pain, falls, joint pain, muscle cramps and muscle weakness.   Gastrointestinal: Positive for bloating. Negative for abdominal pain, constipation, diarrhea, heartburn, hematemesis, hematochezia, melena and nausea.        Ileostomy    Genitourinary: Negative for dysuria, hematuria and nocturia.   Neurological: Negative for excessive daytime sleepiness, dizziness, headaches, light-headedness, loss of balance, numbness and vertigo.     Objective:     Vital Signs (Most Recent):  Temp: 97.9 °F (36.6 °C) (11/18/18 1116)  Pulse: 76 (11/18/18 1116)  Resp: 18 (11/18/18 1116)  BP: 120/63 (11/18/18 1116)  SpO2: 99 % (11/18/18 1116) Vital Signs (24h Range):  Temp:  [97.8 °F (36.6 °C)-98.8 °F (37.1 °C)] 97.9 °F (36.6 °C)  Pulse:  [63-77] 76  Resp:  [16-20] 18  SpO2:  [98 %-99 %] 99 %  BP: ()/(55-63) 120/63     Weight: 87.4 kg (192 lb 10.9 oz)  Body mass index is 29.3 kg/m².     SpO2: 99 %  O2 Device (Oxygen Therapy): room air      Intake/Output Summary (Last 24 hours) at 11/18/2018 1241  Last data filed at 11/18/2018 1113  Gross per 24 hour   Intake 1600 ml   Output 4150 ml   Net -2550 ml       Lines/Drains/Airways     Drain                 Closed/Suction Drain 10/30/18 1300 Abdomen Bulb 19 days         Ileostomy  10/30/18 Loop RUQ 19 days         Urethral Catheter 11/15/18 1700 Double-lumen 16 Fr. 2 days          Pressure Ulcer                 Negative Pressure Wound Therapy  17 days          Peripheral Intravenous Line                 Peripheral IV - Single Lumen 11/16/18 1235 Right;Posterior Wrist 2 days                Physical Exam   Constitutional: He is oriented to person, place, and time. He appears well-developed and well-nourished.   Neck: Neck supple. No JVD present.   Cardiovascular: Normal rate, regular rhythm, normal heart sounds and normal pulses. Exam reveals no friction rub.   No murmur heard.  Pulmonary/Chest: Effort normal and breath sounds normal. No respiratory distress. He has no wheezes. He has no rales.   Abdominal: Soft. Bowel sounds are normal. He exhibits no distension.   ileostomy in place, wound VAC on the left lower quadrant   KINDRA drain with purulent drainage      Musculoskeletal: He exhibits no edema or tenderness.   Neurological: He is alert and oriented to person, place, and time.   Skin: Skin is warm and dry. No rash noted.   Psychiatric: He has a normal mood and affect. His behavior is normal.   Nursing note and vitals reviewed.      Significant Labs:   All pertinent lab results from the last 24 hours have been reviewed. and   Recent Lab Results       11/18/18  1057   11/18/18  0549   11/18/18  0510   11/17/18  2342   11/17/18  1730        Albumin     2.3         Anion Gap     9         Baso #     0.02         Basophil%     0.2         BUN, Bld     70         Calcium     8.5         Chloride     103         CO2     18         Creatinine     5.5  Comment:  Results confirmed, test repeated  Corrected result; previously reported as 5.5 on 11/18/2018 at 07:21.  [C]         Differential Method     Automated         eGFR if      12         eGFR if non      10  Comment:  Calculation used to obtain the estimated glomerular filtration  rate (eGFR) is the CKD-EPI  equation.            Eos #     0.2         Eosinophil%     2.1         Glucose     98         Gran # (ANC)     6.7         Gran%     66.9         Hematocrit     25.9         Hemoglobin     8.7         Lymph #     1.6         Lymph%     16.4         Magnesium     1.7         MCH     27.4         MCHC     33.6         MCV     81         Mono #     1.4         Mono%     14.4         MPV     10.1         Phosphorus     5.5              5.5         Platelets     299         POCT Glucose 132 94   133 157     Potassium     4.2         RBC     3.18         RDW     15.0         Sodium     130         WBC     9.94                              Significant Imaging: Echocardiogram:   2D echo with color flow doppler:   Results for orders placed or performed in visit on 10/01/18   2D echo with color flow doppler   Result Value Ref Range    QEF 30 (A) 55 - 65    Diastolic Dysfunction Yes (A)     Aortic Valve Regurgitation MILD TO MODERATE (A)     Est. PA Systolic Pressure 35.49     Tricuspid Valve Regurgitation MILD

## 2018-11-19 LAB
ALBUMIN SERPL BCP-MCNC: 2.3 G/DL
ANION GAP SERPL CALC-SCNC: 7 MMOL/L
BASOPHILS # BLD AUTO: 0.02 K/UL
BASOPHILS NFR BLD: 0.2 %
BUN SERPL-MCNC: 54 MG/DL
CALCIUM SERPL-MCNC: 8.5 MG/DL
CHLORIDE SERPL-SCNC: 109 MMOL/L
CO2 SERPL-SCNC: 18 MMOL/L
CREAT SERPL-MCNC: 3.4 MG/DL
DIFFERENTIAL METHOD: ABNORMAL
EOSINOPHIL # BLD AUTO: 0.2 K/UL
EOSINOPHIL NFR BLD: 2 %
ERYTHROCYTE [DISTWIDTH] IN BLOOD BY AUTOMATED COUNT: 15.1 %
EST. GFR  (AFRICAN AMERICAN): 21 ML/MIN/1.73 M^2
EST. GFR  (NON AFRICAN AMERICAN): 18 ML/MIN/1.73 M^2
GLUCOSE SERPL-MCNC: 121 MG/DL
HCT VFR BLD AUTO: 28.2 %
HGB BLD-MCNC: 9.3 G/DL
LYMPHOCYTES # BLD AUTO: 1.6 K/UL
LYMPHOCYTES NFR BLD: 15.3 %
MAGNESIUM SERPL-MCNC: 1.7 MG/DL
MCH RBC QN AUTO: 27.4 PG
MCHC RBC AUTO-ENTMCNC: 33 G/DL
MCV RBC AUTO: 83 FL
MONOCYTES # BLD AUTO: 1.2 K/UL
MONOCYTES NFR BLD: 11.7 %
NEUTROPHILS # BLD AUTO: 7.2 K/UL
NEUTROPHILS NFR BLD: 70.8 %
PHOSPHATE SERPL-MCNC: 3.7 MG/DL
PHOSPHATE SERPL-MCNC: 3.7 MG/DL
PLATELET # BLD AUTO: 269 K/UL
PMV BLD AUTO: 10.1 FL
POCT GLUCOSE: 109 MG/DL (ref 70–110)
POCT GLUCOSE: 113 MG/DL (ref 70–110)
POTASSIUM SERPL-SCNC: 4.5 MMOL/L
RBC # BLD AUTO: 3.4 M/UL
SODIUM SERPL-SCNC: 134 MMOL/L
WBC # BLD AUTO: 10.22 K/UL

## 2018-11-19 PROCEDURE — 25000003 PHARM REV CODE 250: Performed by: SURGERY

## 2018-11-19 PROCEDURE — 97161 PT EVAL LOW COMPLEX 20 MIN: CPT

## 2018-11-19 PROCEDURE — 25000003 PHARM REV CODE 250: Performed by: NURSE PRACTITIONER

## 2018-11-19 PROCEDURE — G8979 MOBILITY GOAL STATUS: HCPCS | Mod: CK

## 2018-11-19 PROCEDURE — 83735 ASSAY OF MAGNESIUM: CPT

## 2018-11-19 PROCEDURE — 99233 SBSQ HOSP IP/OBS HIGH 50: CPT | Mod: ,,, | Performed by: INTERNAL MEDICINE

## 2018-11-19 PROCEDURE — 21400001 HC TELEMETRY ROOM

## 2018-11-19 PROCEDURE — G8978 MOBILITY CURRENT STATUS: HCPCS | Mod: CL

## 2018-11-19 PROCEDURE — S0030 INJECTION, METRONIDAZOLE: HCPCS | Performed by: SURGERY

## 2018-11-19 PROCEDURE — 80069 RENAL FUNCTION PANEL: CPT

## 2018-11-19 PROCEDURE — 36415 COLL VENOUS BLD VENIPUNCTURE: CPT

## 2018-11-19 PROCEDURE — 97530 THERAPEUTIC ACTIVITIES: CPT

## 2018-11-19 PROCEDURE — 25000003 PHARM REV CODE 250: Performed by: COLON & RECTAL SURGERY

## 2018-11-19 PROCEDURE — 85025 COMPLETE CBC W/AUTO DIFF WBC: CPT

## 2018-11-19 PROCEDURE — 99232 SBSQ HOSP IP/OBS MODERATE 35: CPT | Mod: ,,, | Performed by: INTERNAL MEDICINE

## 2018-11-19 PROCEDURE — 63600175 PHARM REV CODE 636 W HCPCS: Performed by: SURGERY

## 2018-11-19 RX ORDER — HYDRALAZINE HYDROCHLORIDE 20 MG/ML
10 INJECTION INTRAMUSCULAR; INTRAVENOUS EVERY 8 HOURS PRN
Status: DISCONTINUED | OUTPATIENT
Start: 2018-11-19 | End: 2018-11-21 | Stop reason: HOSPADM

## 2018-11-19 RX ORDER — METRONIDAZOLE 500 MG/1
500 TABLET ORAL EVERY 8 HOURS
Status: DISCONTINUED | OUTPATIENT
Start: 2018-11-19 | End: 2018-11-21 | Stop reason: HOSPADM

## 2018-11-19 RX ORDER — ENOXAPARIN SODIUM 100 MG/ML
30 INJECTION SUBCUTANEOUS EVERY 24 HOURS
Status: DISCONTINUED | OUTPATIENT
Start: 2018-11-19 | End: 2018-11-21 | Stop reason: HOSPADM

## 2018-11-19 RX ORDER — CIPROFLOXACIN 500 MG/1
500 TABLET ORAL DAILY
Status: DISCONTINUED | OUTPATIENT
Start: 2018-11-19 | End: 2018-11-21 | Stop reason: HOSPADM

## 2018-11-19 RX ADMIN — METRONIDAZOLE 500 MG: 500 TABLET, FILM COATED ORAL at 02:11

## 2018-11-19 RX ADMIN — CARVEDILOL 12.5 MG: 12.5 TABLET, FILM COATED ORAL at 08:11

## 2018-11-19 RX ADMIN — LOPERAMIDE HYDROCHLORIDE 2 MG: 2 CAPSULE ORAL at 08:11

## 2018-11-19 RX ADMIN — PSYLLIUM HUSK 6 G: 6 GRANULE ORAL at 08:11

## 2018-11-19 RX ADMIN — SIMVASTATIN 10 MG: 5 TABLET, FILM COATED ORAL at 08:11

## 2018-11-19 RX ADMIN — PANTOPRAZOLE SODIUM 40 MG: 40 TABLET, DELAYED RELEASE ORAL at 08:11

## 2018-11-19 RX ADMIN — METRONIDAZOLE 500 MG: 500 INJECTION, SOLUTION INTRAVENOUS at 05:11

## 2018-11-19 RX ADMIN — METRONIDAZOLE 500 MG: 500 TABLET, FILM COATED ORAL at 11:11

## 2018-11-19 RX ADMIN — SODIUM CHLORIDE: 0.9 INJECTION, SOLUTION INTRAVENOUS at 02:11

## 2018-11-19 RX ADMIN — ACETAMINOPHEN 650 MG: 325 TABLET, FILM COATED ORAL at 11:11

## 2018-11-19 RX ADMIN — ENOXAPARIN SODIUM 30 MG: 100 INJECTION SUBCUTANEOUS at 06:11

## 2018-11-19 RX ADMIN — ALLOPURINOL 100 MG: 100 TABLET ORAL at 08:11

## 2018-11-19 RX ADMIN — LOPERAMIDE HYDROCHLORIDE 2 MG: 2 CAPSULE ORAL at 02:11

## 2018-11-19 RX ADMIN — SODIUM CHLORIDE: 0.9 INJECTION, SOLUTION INTRAVENOUS at 11:11

## 2018-11-19 RX ADMIN — CIPROFLOXACIN HYDROCHLORIDE 500 MG: 500 TABLET, FILM COATED ORAL at 09:11

## 2018-11-19 RX ADMIN — LOPERAMIDE HYDROCHLORIDE 2 MG: 2 CAPSULE ORAL at 11:11

## 2018-11-19 NOTE — PROGRESS NOTES
[facility] Hospital Medicine  Progress Note    Patient Name: Yan Choudhury  MRN: [id number]  Patient Class: IP- Inpatient   Admission Date: 11/15/2018  Length of Stay: 4 days  Attending Physician: Kevin Lindsay MD  Primary Care Provider: Sandra Estevez MD        Subjective:     Principal Problem:Open wound of anterior abdominal wall with complication    HPI:  Patient is a 63 y.o. male presents after a colostomy reversal, repair of parastomal hernia and protective ileostomy with complaint of purulent material from the drain. Patient was direct admitted by General Surgery. Renal was consulted for Acute on chronic kidney injury. Renal Note reviewed and plans for gentle hydration overnight. HM consulted for assistance with medical management. Patient seen and examined in his room. Patient with no current complaints other than purulent drain discharge. No modifying factors, no associated symptoms. Disucssed case with Dr. Lindsay patient is to receive both flagyl and cipro and CT ABD pending. Significant medical conditions include CAD, CHF, HTN, DM2.          Hospital Course:  11/16/18 creatinine still 8. Nephrology and Cardiology assisting with management. Renal ultrasound pending. No surgical intervention indicated at present. As of 11/17 creatinine has improved from 8.0 to 7.2. No HD to be performed at this time. Renal US showed minimal increased cortical echogenicity right kidney. Hypotensive episodes overnight and systolic now 97. Pt currently taking Coreg 25 mg PO daily. Will split the dose of Coreg to 12.5 mg PO BID with holding parameters. Blood sugars controlled. As of 11/18 pt was transferred to Telemetry last night per Cardiology recommendations. Creatinine improving, currently 5.5 from 7.2 with gentle IV hydration. Leukocytosis resolved. Remains afebrile. Hx of CHF, appears compensated at this time. DM controlled. As of 11/19 creatinine continues to improve with IV hydration, currently 3.4  from 5.5. BP also improving. Pt still having moderate amt of ileostomy outpt. Per General surgery, needs to have < 1L a day of ileostomy output before discharge.     Interval History: Pt seen and examined. Wife at bedside. Pt has no complaints at this time.     Review of Systems   Constitutional: Negative for chills, diaphoresis, fatigue and fever.   HENT: Negative for congestion, sore throat and voice change.    Eyes: Negative for photophobia and visual disturbance.   Respiratory: Negative for cough, shortness of breath, wheezing and stridor.    Cardiovascular: Negative for chest pain and leg swelling.   Gastrointestinal: Negative for abdominal distention, abdominal pain, constipation, diarrhea, nausea and vomiting.        S/p abdominal surgery     Endocrine: Negative for polydipsia, polyphagia and polyuria.   Genitourinary: Negative for difficulty urinating, dysuria, flank pain, testicular pain and urgency.   Musculoskeletal: Negative for back pain, joint swelling, neck pain and neck stiffness.   Skin: Positive for wound. Negative for color change and rash.   Allergic/Immunologic: Negative for immunocompromised state.   Neurological: Negative for dizziness, syncope, weakness, numbness and headaches.   Hematological: Does not bruise/bleed easily.   Psychiatric/Behavioral: Negative for agitation, behavioral problems and confusion.     Objective:     Vital Signs (Most Recent):  Temp: 98.7 °F (37.1 °C) (11/19/18 1225)  Pulse: 90 (11/19/18 1225)  Resp: 18 (11/19/18 1225)  BP: 131/73 (11/19/18 1225)  SpO2: 98 % (11/19/18 1225) Vital Signs (24h Range):  Temp:  [97.5 °F (36.4 °C)-99 °F (37.2 °C)] 98.7 °F (37.1 °C)  Pulse:  [67-90] 90  Resp:  [16-20] 18  SpO2:  [97 %-99 %] 98 %  BP: (106-131)/(58-73) 131/73     Weight: 92.2 kg (203 lb 4.2 oz)  Body mass index is 30.91 kg/m².    Intake/Output Summary (Last 24 hours) at 11/19/2018 1536  Last data filed at 11/19/2018 1521  Gross per 24 hour   Intake 821.67 ml   Output 2265 ml    Net -1443.33 ml      Physical Exam   Constitutional: He is oriented to person, place, and time. He appears well-developed. No distress.   HENT:   Head: Normocephalic.   Eyes: EOM are normal. Pupils are equal, round, and reactive to light.   Neck: Normal range of motion. Neck supple. No JVD present. No thyromegaly present.   Cardiovascular: Normal rate, regular rhythm, S1 normal, S2 normal, normal heart sounds and intact distal pulses. PMI is not displaced. Exam reveals no gallop and no friction rub.   No murmur heard.  Pulmonary/Chest: Effort normal and breath sounds normal. No respiratory distress. He has no wheezes. He has no rales. He exhibits no tenderness.   Abdominal: Soft. He exhibits no distension and no mass. There is no hepatosplenomegaly. There is no tenderness. There is no rebound and no CVA tenderness. No hernia.    ileostomy in place (stool becoming more formed), wound VAC  LLQ, KINDRA drain with purulent drainage   Genitourinary:   Genitourinary Comments: Guzman catheter   Musculoskeletal: Normal range of motion. He exhibits no edema or tenderness.   Lymphadenopathy:     He has no cervical adenopathy.   Neurological: He is alert and oriented to person, place, and time. He has normal reflexes. He is not disoriented. He displays normal reflexes. No cranial nerve deficit. He exhibits normal muscle tone. Coordination normal.   Skin: Skin is warm and dry. Capillary refill takes less than 2 seconds. No rash noted. No erythema.   Psychiatric: He has a normal mood and affect. His behavior is normal. Judgment and thought content normal.   Nursing note and vitals reviewed.      Significant Labs:   CBC:   Recent Labs   Lab 11/18/18  0510 11/19/18  0437   WBC 9.94 10.22   HGB 8.7* 9.3*   HCT 25.9* 28.2*    269     CMP:   Recent Labs   Lab 11/18/18  0510 11/19/18  0437   * 134*   K 4.2 4.5    109   CO2 18* 18*   GLU 98 121*   BUN 70* 54*   CREATININE 5.5* 3.4*   CALCIUM 8.5* 8.5*   ALBUMIN 2.3*  2.3*   ANIONGAP 9 7*   EGFRNONAA 10* 18*     Magnesium:   Recent Labs   Lab 11/18/18  0510 11/19/18  0437   MG 1.7 1.7     POCT Glucose:   Recent Labs   Lab 11/18/18  1057 11/18/18  2101 11/19/18  0531   POCTGLUCOSE 132* 128* 109       Significant Imaging:       Assessment/Plan:      * Open wound of anterior abdominal wall with complication    -General surgery managing  -PO antibiotics with Ciprofloxacin and Flagyl   -No indication for further surgical intervention for now     Gastrointestinal anastomotic leak    -General Surgery managing  -KINDRA drain in place       Acute on chronic renal failure    -Nephrology following   -Creatine currently 3.4 with metabolic acidosis. Renal ultrasound with no acute findings.   -Continue gentle hydration       Chronic combined systolic and diastolic congestive heart failure    -Currently compensated  -Monitor hydration status  -Continue BB with holding parameters           Ischemic cardiomyopathy    -Cardiology following  -He does not appear to be volume overloaded at this time  -Continue cardiac medications     Hypertension associated with diabetes    - Glucose stable. Continue accuchecks and moderate dose SSI  - BP improving. Continue Coreg with holding parameters  - Hydralazine PRN           VTE Risk Mitigation (From admission, onward)        Ordered     enoxaparin injection 30 mg  Daily      11/19/18 0900     Place sequential compression device  Until discontinued      11/15/18 2006     IP VTE HIGH RISK PATIENT  Once      11/15/18 2006              MANPREET Fuentes  Department of Hospital Medicine   [facility]

## 2018-11-19 NOTE — PROGRESS NOTES
[facility] -   Nephrology  Progress Note    Patient Name: Yan Choudhury  MRN: [id number]  Admission Date: 11/15/2018  Hospital Length of Stay: 4 days  Attending Provider: Kevin Lindsay MD   Primary Care Physician: Sandra Estevez MD  Principal Problem:Open wound of anterior abdominal wall with complication    Subjective:     HPI: Patient is a 63-year-old male with history of chronic kidney disease followed by our division in the outpatient clinic.  Patient has been seen by Dr. Seth and Dr. Juan Luis Galeas in the past.  Patient has had history of diverticular perforation requiring colostomy.  Patient is status post colostomy reversal as well as repair of paraostomy hernia repair.  Patient was recently hospitalized with dehydration.  Patient also has history of congestive heart failure followed by Dr. Calvillo as an outpatient.  Patient takes metolazone as an outpatient for diuretics.  Patient was seen by Dr. Lindsay as an outpatient today and was admitted for dehydration.  Patient's baseline creatinine ranged between 1.8 and 2.1.  Patient never had any proteinuria.  His creatinine has gone up to 8.2 with a BUN elevation as well as hyponatremia.  Nephrology consultation is requested for acute kidney injury on chronic kidney disease stage 3 4/stage IV.    Interval History:       11/19/18: Patient reports intermittent abdominal pain, none at present.         Review of patient's allergies indicates:  No Known Allergies  Current Facility-Administered Medications   Medication Frequency    0.9%  NaCl infusion (for blood administration) Q24H PRN    0.9%  NaCl infusion Continuous    acetaminophen tablet 650 mg Q8H PRN    allopurinol tablet 100 mg Daily    carvedilol tablet 12.5 mg BID    ciprofloxacin HCl tablet 500 mg Daily    dextrose 50% injection 12.5 g PRN    dextrose 50% injection 25 g PRN    enoxaparin injection 30 mg Daily    glucagon (human recombinant) injection 1 mg PRN    glucose chewable  tablet 16 g PRN    glucose chewable tablet 24 g PRN    insulin aspart U-100 pen 1-10 Units QID (AC + HS) PRN    lidocaine (PF) 10 mg/ml (1%) injection 10 mg Once    loperamide capsule 2 mg QID (PC + HS)    metroNIDAZOLE tablet 500 mg Q8H    ondansetron injection 4 mg Q8H PRN    pantoprazole EC tablet 40 mg Daily    psyllium husk packet 6 g Daily    simvastatin tablet 10 mg QHS       Objective:     Vital Signs (Most Recent):  Temp: 98.7 °F (37.1 °C) (11/19/18 1225)  Pulse: 90 (11/19/18 1225)  Resp: 18 (11/19/18 1225)  BP: 131/73 (11/19/18 1225)  SpO2: 98 % (11/19/18 1225)  O2 Device (Oxygen Therapy): room air (11/19/18 1225) Vital Signs (24h Range):  Temp:  [97.5 °F (36.4 °C)-99 °F (37.2 °C)] 98.7 °F (37.1 °C)  Pulse:  [67-90] 90  Resp:  [16-20] 18  SpO2:  [97 %-99 %] 98 %  BP: ()/(55-73) 131/73     Weight: 92.2 kg (203 lb 4.2 oz) (11/19/18 0416)  Body mass index is 30.91 kg/m².  Body surface area is 2.1 meters squared.    I/O last 3 completed shifts:  In: 1101.7 [P.O.:720; Blood:381.7]  Out: 5050 [Urine:2575; Drains:150; Other:50; Stool:2275]    Physical Exam   Constitutional: He appears well-developed. He is cooperative.   HENT:   Head: Normocephalic.   Nose: No rhinorrhea.   Mouth/Throat: Mucous membranes are normal. No oropharyngeal exudate.   Eyes:   Chronic right eye lesion.    Neck: No thyroid mass present.   Cardiovascular: Normal rate, regular rhythm, S1 normal, S2 normal and intact distal pulses.   Pulmonary/Chest: Effort normal. No respiratory distress. He has no wheezes.   Abdominal: Soft. Bowel sounds are normal. He exhibits no distension. There is no tenderness. No hernia.   Ileostomy bag in place, abdominal drain and abdominal wound vac in place.    Lymphadenopathy:     He has no cervical adenopathy.   Neurological: He is alert.   Skin: Skin is warm and dry.       Significant Labs:  Lab Results   Component Value Date    CREATININE 3.4 (H) 11/19/2018    BUN 54 (H) 11/19/2018     (L)  11/19/2018    K 4.5 11/19/2018     11/19/2018    CO2 18 (L) 11/19/2018     Lab Results   Component Value Date    .0 (H) 08/10/2018    CALCIUM 8.5 (L) 11/19/2018    PHOS 3.7 11/19/2018    PHOS 3.7 11/19/2018     Lab Results   Component Value Date    ALBUMIN 2.3 (L) 11/19/2018     Lab Results   Component Value Date    WBC 10.22 11/19/2018    HGB 9.3 (L) 11/19/2018    HCT 28.2 (L) 11/19/2018    MCV 83 11/19/2018     11/19/2018     Recent Labs   Lab 11/19/18  0437   MG 1.7         Significant Imaging:                Assessment/Plan:     Acute on chronic renal failure    Patient presented with BROOKE which was likely caused by dehydration. Renal function has improved with fluids and creatinine has declined to 3.4 today. UOP was 2.1 liters over past 24 hours. No need for dialysis. Will monitor with repeat renal panel.          Thank you for your consult. I will follow-up with patient. Please contact us if you have any additional questions.    Aubrey Seth MD  Nephrology  [facility]

## 2018-11-19 NOTE — ASSESSMENT & PLAN NOTE
- Glucose stable. Continue accuchecks and moderate dose SSI  - BP improving. Continue Coreg with holding parameters  - Hydralazine PRN

## 2018-11-19 NOTE — ASSESSMENT & PLAN NOTE
-General surgery managing  -PO antibiotics with Ciprofloxacin and Flagyl   -No indication for further surgical intervention for now

## 2018-11-19 NOTE — SUBJECTIVE & OBJECTIVE
ROS  Objective:     Vital Signs (Most Recent):  Temp: 98.7 °F (37.1 °C) (11/19/18 1225)  Pulse: 90 (11/19/18 1225)  Resp: 18 (11/19/18 1225)  BP: 131/73 (11/19/18 1225)  SpO2: 98 % (11/19/18 1225) Vital Signs (24h Range):  Temp:  [97.5 °F (36.4 °C)-99 °F (37.2 °C)] 98.7 °F (37.1 °C)  Pulse:  [67-90] 90  Resp:  [16-20] 18  SpO2:  [97 %-99 %] 98 %  BP: ()/(55-73) 131/73     Weight: 92.2 kg (203 lb 4.2 oz)  Body mass index is 30.91 kg/m².     SpO2: 98 %  O2 Device (Oxygen Therapy): room air      Intake/Output Summary (Last 24 hours) at 11/19/2018 1452  Last data filed at 11/19/2018 1015  Gross per 24 hour   Intake 821.67 ml   Output 2615 ml   Net -1793.33 ml       Lines/Drains/Airways     Drain                 Closed/Suction Drain 10/30/18 1300 Abdomen Bulb 20 days         Ileostomy 10/30/18 Loop RUQ 20 days         Urethral Catheter 11/15/18 1700 Double-lumen 16 Fr. 3 days          Pressure Ulcer                 Negative Pressure Wound Therapy  18 days          Peripheral Intravenous Line                 Peripheral IV - Single Lumen 11/16/18 1235 Right;Posterior Wrist 3 days         Peripheral IV - Single Lumen 11/18/18 1318 Posterior;Right Forearm 1 day                Physical Exam   Constitutional: He is oriented to person, place, and time. He appears well-developed and well-nourished.   Neck: Neck supple. No JVD present.   Cardiovascular: Normal rate, regular rhythm, normal heart sounds and normal pulses. Exam reveals no friction rub.   No murmur heard.  Pulmonary/Chest: Effort normal and breath sounds normal. No respiratory distress. He has no wheezes. He has no rales.   Abdominal: Soft. Bowel sounds are normal. He exhibits no distension.   ileostomy in place, wound VAC on the left lower quadrant   KINDRA drain with purulent drainage      Musculoskeletal: He exhibits no edema or tenderness.   Neurological: He is alert and oriented to person, place, and time.   Skin: Skin is warm and dry. No rash noted.    Psychiatric: He has a normal mood and affect. His behavior is normal.   Nursing note and vitals reviewed.      Significant Labs:   ABG: No results for input(s): PH, PCO2, HCO3, POCSATURATED, BE in the last 48 hours., Blood Culture: No results for input(s): LABBLOO in the last 48 hours., BMP:   Recent Labs   Lab 11/18/18  0510 11/19/18  0437   GLU 98 121*   * 134*   K 4.2 4.5    109   CO2 18* 18*   BUN 70* 54*   CREATININE 5.5* 3.4*   CALCIUM 8.5* 8.5*   MG 1.7 1.7   , CMP   Recent Labs   Lab 11/18/18  0510 11/19/18  0437   * 134*   K 4.2 4.5    109   CO2 18* 18*   GLU 98 121*   BUN 70* 54*   CREATININE 5.5* 3.4*   CALCIUM 8.5* 8.5*   ALBUMIN 2.3* 2.3*   ANIONGAP 9 7*   ESTGFRAFRICA 12* 21*   EGFRNONAA 10* 18*   , CBC   Recent Labs   Lab 11/18/18  0510 11/19/18  0437   WBC 9.94 10.22   HGB 8.7* 9.3*   HCT 25.9* 28.2*    269   , INR No results for input(s): INR, PROTIME in the last 48 hours., Lipid Panel No results for input(s): CHOL, HDL, LDLCALC, TRIG, CHOLHDL in the last 48 hours. and Troponin No results for input(s): TROPONINI in the last 48 hours.    Significant Imaging: EKG:

## 2018-11-19 NOTE — PLAN OF CARE
11/19/18 1035   Medicare Message   Important Message from Medicare regarding Discharge Appeal Rights Given to patient/caregiver;Explained to patient/caregiver;Signed/date by patient/caregiver   Date IMM was signed 11/19/18   Time IMM was signed 1033

## 2018-11-19 NOTE — PT/OT/SLP EVAL
Physical Therapy Evaluation    Patient Name:  Yan Choudhury   MRN:  [id number]    Recommendations:     Discharge Recommendations:  home health PT   Discharge Equipment Recommendations: none   Barriers to discharge: None    Assessment:     Yan Choudhury is a 63 y.o. male admitted with a medical diagnosis of Open wound of anterior abdominal wall with complication.  He presents with the following impairments/functional limitations:  weakness, impaired endurance, impaired functional mobilty, gait instability, decreased lower extremity function, pain, impaired balance .    Rehab Prognosis:  GOOD; patient would benefit from acute skilled PT services to address these deficits and reach maximum level of function.      Recent Surgery: * No surgery found *      Plan:     During this hospitalization, patient to be seen   to address the above listed problems via gait training, therapeutic activities, therapeutic exercises  · Plan of Care Expires:  11/26/18   Plan of Care Reviewed with: patient, spouse    Subjective     Communicated with NURSE AND Epic CHART REVIEW prior to session.  Patient found SEATED IN CHAIR  upon PT entry to room, agreeable to evaluation.      Chief Complaint: PAIN  Patient comments/goals: NONE STATED   Pain/Comfort:  · Pain Rating 1: 8/10  · Location 1: abdomen  · Pain Addressed 1: Nurse notified  · Pain Rating Post-Intervention 1: 8/10    Patients cultural, spiritual, Orthodoxy conflicts given the current situation:      Living Environment:  PT LIVES AT HOME WITH WIFE AND HAS NO STEPS TO ENTER HOME  Prior to admission, patients level of function was IND AND DRIVES.  Patient has the following equipment: cane, straight.  DME owned (not currently used): single point cane.  Upon discharge, patient will have assistance from WIFE.    Objective:     Patient found with: peripheral IV, telemetry, wound vac     General Precautions: Standard,     Orthopedic Precautions:N/A   Braces: N/A     Exams:  · RLE ROM:  WFL  · RLE Strength: WFL  · LLE ROM: WFL  · LLE Strength: WFL    Functional Mobility:  · PT MET IN RM SEATED IN CHAIR PT STOOD WITH NO AD AND CGA. PT SEATED FOR REST BREAK AND P.T. COMPLETED MMT B LE. PT LEFT SEATED IN CHAIR AS PT REFUSED GT TRAINING AT THIS TIME.     AM-PAC 6 CLICK MOBILITY  Total Score:15     Patient left up in chair with call button in reach.    GOALS:   Multidisciplinary Problems     Physical Therapy Goals        Problem: Physical Therapy Goal    Goal Priority Disciplines Outcome Goal Variances Interventions   Physical Therapy Goal     PT, PT/OT      Description:  PT WILL BE SEEN FOR P.T. FOR A MIN OF 5 OUT OF 7 DAYS A WEEK  LT18  1. PT WILL COMPLETE BED MOBILITY IND  2. PT WILL T/F TO CHAIR WITH NO AD AND S  3. PT WILL GT TRAIN X 100' WITH CGA                      History:     Past Medical History:   Diagnosis Date    Arthritis     CAD (coronary artery disease)     Cataract     CHF (congestive heart failure)     Dr Calvillo    Chronic combined systolic and diastolic congestive heart failure 3/24/2015    CKD (chronic kidney disease), stage III     Diabetes mellitus type II      AM    Diabetic retinopathy     anti Veg F injections for macular edema    Diverticulitis of colon     ED (erectile dysfunction)     Glaucoma     HTN (hypertension)     Hyperlipidemia     Ischemic cardiomyopathy     Obesity     JAHAIRA on CPAP     Pacemaker     Pulmonary HTN        Past Surgical History:   Procedure Laterality Date    CARDIAC CATHETERIZATION      CHOLECYSTECTOMY      COLECTOMY-SIGMOID N/A 2016    Performed by Kevin Lindsay [facility] at [facility] OR    COLONOSCOPY N/A 10/11/2018    Procedure: COLONOSCOPY;  Surgeon: Quinn Aragon MD;  Location: [facility] ENDO;  Service: General;  Laterality: N/A;    COLONOSCOPY N/A 10/11/2018    Performed by Quinn Aragon MD at [facility] ENDO    COLOSTOMY N/A 2016    Performed by Kevin Lindsay [facility] at [facility] OR    CREATION, ILEOSTOMY  N/A 10/30/2018    Per[facility] at [facility] OR    EYE SURGERY      HEMICOLECTOMY, RIGHT Right 10/30/2018    Performed by Kevin [facility] at [facility] OR    ILEOSTOMY N/A 10/30/2018    Procedure: CREATION, ILEOSTOMY;  Surgeon: Kevin Lindsay MD;  Location: [facility] OR;  Service: General;  Laterality: N/A;    KNEE SURGERY      MOBILIZATION OF SPLENIC FLEXURE N/A 10/30/2018    Procedure: MOBILIZATION, SPLENIC FLEXURE;  Surgeon: Kevin Lindsay MD;  Location: [facility] OR;  Service: General;  Laterality: N/A;    MOBILIZATION, SPLENIC FLEXURE N/A 10/30/2018    Performed by Kevin Lindsay MD at [facility] OR    REVISION COLOSTOMY N/A 10/30/2018    Procedure: REVISION OR CLOSURE, COLOSTOMY;  Surgeon: Kevin Lindsay MD;  Location: [facility] OR;  Service: General;  Laterality: N/A;  Parastomal Hernia Repair    REVISION OR CLOSURE, COLOSTOMY N/A 10/30/2018    Performed by Kevin Lindsay MD at [facility] OR    RIGHT HEMICOLECTOMY Right 10/30/2018    Procedure: HEMICOLECTOMY, RIGHT;  Surgeon: Kevin Lindsay MD;  Location: [facility] OR;  Service: General;  Laterality: Right;       Clinical Decision Making:     History  Co-morbidities and personal factors that may impact the plan of care Examination  Body Structures and Functions, activity limitations and participation restrictions that may impact the plan of care Clinical Presentation   Decision Making/ Complexity Score   Co-morbidities:   [] Time since onset of injury / illness / exacerbation  [] Status of current condition  []Patient's cognitive status and safety concerns    [] Multiple Medical Problems (see med hx)  Personal Factors:   [] Patient's age  [] Prior Level of function   [] Patient's home situation (environment and family support)  [] Patient's level of motivation  [] Expected progression of patient      HISTORY:(criteria)    [] 50958 - no personal factors/history    [] 02201 - has 1-2 personal factor/comorbidity     [] 65452 - has >3 personal  factor/comorbidity     Body Regions:  [] Objective examination findings  [] Head     []  Neck  [] Trunk   [] Upper Extremity  [] Lower Extremity    Body Systems:  [] For communication ability, affect, cognition, language, and learning style: the assessment of the ability to make needs known, consciousness, orientation (person, place, and time), expected emotional /behavioral responses, and learning preferences (eg, learning barriers, education  needs)  [] For the neuromuscular system: a general assessment of gross coordinated movement (eg, balance, gait, locomotion, transfers, and transitions) and motor function  (motor control and motor learning)  [] For the musculoskeletal system: the assessment of gross symmetry, gross range of motion, gross strength, height, and weight  [] For the integumentary system: the assessment of pliability(texture), presence of scar formation, skin color, and skin integrity  [] For cardiovascular/pulmonary system: the assessment of heart rate, respiratory rate, blood pressure, and edema     Activity limitations:    [] Patient's cognitive status and saf ety concerns          [] Status of current condition      [] Weight bearing restriction  [] Cardiopulmunary Restriction    Participation Restrictions:   [] Goals and goal agreement with the patient     [] Rehab potential (prognosis) and probable outcome      Examination of Body System: (criteria)    [] 40681 - addressing 1-2 elements    [] 75605 - addressing a total of 3 or more elements     [] 07220 -  Addressing a total of 4 or more elements         Clinical Presentation: (criteria)  Choose one     On examination of body system using standardized tests and measures patient presents with (CHOOSE ONE) elements from any of the following: body structures and functions, activity limitations, and/or participation restrictions.  Leading to a clinical presentation that is considered (CHOOSE ONE)                              Clinical Decision  Making  (Eval Complexity):  Choose One     Time Tracking:     PT Received On: 11/19/18  PT Start Time: 1105     PT Stop Time: 1130  PT Total Time (min): 25 min     Billable Minutes: Evaluation 15 and Therapeutic Activity 10      Hanny Murray, PT  11/19/2018

## 2018-11-19 NOTE — ASSESSMENT & PLAN NOTE
-Nephrology following   -Creatine currently 3.4 with metabolic acidosis. Renal ultrasound with no acute findings.   -Continue gentle hydration

## 2018-11-19 NOTE — PROGRESS NOTES
11/19/18 1245   Handoff Report   Given To Jesenia   Pain/Comfort Assessments   Pain Assessment Performed Yes       Number Scale   Presence of Pain denies       Ileostomy 10/30/18 Loop RUQ   Placement Date: 10/30/18   Present Prior to Hospital Arrival?: Yes  Inserted by: MD  Ileostomy Type: Loop  Location: RUQ   Stoma Appearance round;moist;pink   Stoma Size (in) 35mm   Appliance 1-piece   Accessories/Skin Care skin sealant;skin barrier ring   Peristomal Assessment Ulcerated   Tolerance no signs/symptoms of discomfort;assisted w/ stoma care   Output (mL) 90 mL   Skin   Skin WDL ex   Skin Temperature warm   Skin Moisture Dry   Skin Elasticity Brisk   Trip Risk Assessment   Sensory Perception 3-->slightly limited   Moisture 4-->rarely moist   Activity 3-->walks occasionally   Mobility 3-->slightly limited   Nutrition 3-->adequate   Friction and Shear 3-->no apparent problem   Trip Score 19       Negative Pressure Wound Therapy    Placement Date/Time: 11/01/18 1100   Side: Left  Orientation: upper  Location: Abdomen   NPWT Type Vacuum Therapy   Therapy Setting NPWT Continuous therapy   Pressure Setting NPWT 125 mmHg   Therapy Interventions NPWT Canister changed;Seal intact   Sponges Inserted NPWT Black;2   Sponges Removed NPWT Black;2       Wound 11/15/18 Other (comment) upper Abdomen   Date First Assessed: 11/15/18   Pre-existing: Yes  Primary Wound Type: (c) Other (comment)  Side: Left  Orientation: upper  Location: Abdomen   Wound WDL ex   Dressing Appearance Dry;Intact   Drainage Amount Small   Drainage Characteristics/Odor Serosanguineous   Appearance Red;Granulating;Moist   Tissue loss description Full thickness   Red (%), Wound Tissue Color 100 %   Periwound Area Dry;Intact   Wound Edges Open   Wound Length (cm) 4 cm   Wound Width (cm) 6 cm   Wound Depth (cm) 4 cm   Wound Volume (cm^3) 96 cm^3   Care Cleansed with:;Sterile normal saline;Applied:;Skin Barrier   Dressing Changed  (wound vac)   Dressing  Change Due 11/21/18       Closed/Suction Drain 10/30/18 1300 Abdomen Bulb   Placement Date/Time: 10/30/18 1300   Present Prior to Hospital Arrival?: No  Inserted by: MD  Tube Number: 1  Location: Abdomen  Drain Tube Type: Bulb  Drain Reservoir Size (mL): 100 mL   Site Description Other (Comment)  (ulcerated - paste barrier applied)     Follow up visit with Mr. Choudhury for continued Ileostomy care and wound vac dressing change. Ileostomy pouch noted to beginning to leak at 3 o'clock. Pouch removed and changed at this time with patient assistance.  Stoma remains moist and round and pink, measures 35mm.  Cyndie stomal skin stage 3 pressure injuries again noted x2 at 3 o'clock and 9 o'clock, improving in condition and appearance.  Cleansed all with water and patted dry.  Cyndie stomal skin sprayed with cavilon. Brava moldable ring applied, and one piece sensura JAVED cut to size and applied to stoma with good seal noted and no leak.  3oz (90mL) emptied at this time. Stool is majority thin dark green/brown liquid with small pieces of formed thicker stool also noted.  Discussed with patient need to keep strict record of Ileostomy output. Provided him with log sheet to record output and showed patient markings on graduated cylinder. Per Dr. Lindsay's note, goal is <1L output per day.  After reapplying Ileostomy pouch, effluent noted in new pouch appears 50% brown and mucousy consistency and 50% soft formed stool.    KINDRA drain again noted with purulent drainage.  Cyndie-tube ulceration noted with creamy drainage. Cleansed all with saline and patted dry. Thin layer critic aid paste moisture barrier applied to affected areas, and drain sponge applied.  LUQ old colostomy site wound vac dressing then changed. Wound vac paused.  Dressing removed. Wound measures [id number] with moist beefy red granulating wound bed and small amount serosanguinous drainage. Cleansed with saline.  Small black foam cut to size and applied to fill wound bed.   Covered with drape, then sensatrac pad and tubing applied and attached to KCI wound vac ULTA at continuous -125 mmHg low intensity suction with good seal noted.  Patient tolerated wound care well. Wife present at bedside during care. She is visually impaired, so unable to assist patient in recording Ileostomy output.  Will continue to follow.

## 2018-11-19 NOTE — PROGRESS NOTES
[facility] -   Cardiology  Progress Note    Patient Name: Yan Choudhury  MRN: [id number]  Admission Date: 11/15/2018  Hospital Length of Stay: 4 days  Code Status: Full Code   Attending Physician: Kevin Lindsay MD   Primary Care Physician: Sandra Estevez MD  Expected Discharge Date:   Principal Problem:Open wound of anterior abdominal wall with complication    Subjective:     Hospital Course:   11/17/18- Renal function with no significant improvement today. Creatine down to 7.2 from 8.0 yesterday. Still receiving IVF for dehydration. Has no evidence of volume overload on exam. Is making urine, but is concentrated. KINDRA drain in place with purulent output noted. Ileostomy with liquid output. Wound vac in place.     11/18/18- Creatine improved today. Down to 5.5. Does have decline in H/H. Has no evidence of volume overload on exam. Still receiving IVF.     11/19. Improved Cr and urine output      ROS  Objective:     Vital Signs (Most Recent):  Temp: 98.7 °F (37.1 °C) (11/19/18 1225)  Pulse: 90 (11/19/18 1225)  Resp: 18 (11/19/18 1225)  BP: 131/73 (11/19/18 1225)  SpO2: 98 % (11/19/18 1225) Vital Signs (24h Range):  Temp:  [97.5 °F (36.4 °C)-99 °F (37.2 °C)] 98.7 °F (37.1 °C)  Pulse:  [67-90] 90  Resp:  [16-20] 18  SpO2:  [97 %-99 %] 98 %  BP: ()/(55-73) 131/73     Weight: 92.2 kg (203 lb 4.2 oz)  Body mass index is 30.91 kg/m².     SpO2: 98 %  O2 Device (Oxygen Therapy): room air      Intake/Output Summary (Last 24 hours) at 11/19/2018 1452  Last data filed at 11/19/2018 1015  Gross per 24 hour   Intake 821.67 ml   Output 2615 ml   Net -1793.33 ml       Lines/Drains/Airways     Drain                 Closed/Suction Drain 10/30/18 1300 Abdomen Bulb 20 days         Ileostomy 10/30/18 Loop RUQ 20 days         Urethral Catheter 11/15/18 1700 Double-lumen 16 Fr. 3 days          Pressure Ulcer                 Negative Pressure Wound Therapy  18 days          Peripheral Intravenous Line                  Peripheral IV - Single Lumen 11/16/18 1235 Right;Posterior Wrist 3 days         Peripheral IV - Single Lumen 11/18/18 1318 Posterior;Right Forearm 1 day                Physical Exam   Constitutional: He is oriented to person, place, and time. He appears well-developed and well-nourished.   Neck: Neck supple. No JVD present.   Cardiovascular: Normal rate, regular rhythm, normal heart sounds and normal pulses. Exam reveals no friction rub.   No murmur heard.  Pulmonary/Chest: Effort normal and breath sounds normal. No respiratory distress. He has no wheezes. He has no rales.   Abdominal: Soft. Bowel sounds are normal. He exhibits no distension.   ileostomy in place, wound VAC on the left lower quadrant   KINDRA drain with purulent drainage      Musculoskeletal: He exhibits no edema or tenderness.   Neurological: He is alert and oriented to person, place, and time.   Skin: Skin is warm and dry. No rash noted.   Psychiatric: He has a normal mood and affect. His behavior is normal.   Nursing note and vitals reviewed.      Significant Labs:   ABG: No results for input(s): PH, PCO2, HCO3, POCSATURATED, BE in the last 48 hours., Blood Culture: No results for input(s): LABBLOO in the last 48 hours., BMP:   Recent Labs   Lab 11/18/18  0510 11/19/18  0437   GLU 98 121*   * 134*   K 4.2 4.5    109   CO2 18* 18*   BUN 70* 54*   CREATININE 5.5* 3.4*   CALCIUM 8.5* 8.5*   MG 1.7 1.7   , CMP   Recent Labs   Lab 11/18/18  0510 11/19/18  0437   * 134*   K 4.2 4.5    109   CO2 18* 18*   GLU 98 121*   BUN 70* 54*   CREATININE 5.5* 3.4*   CALCIUM 8.5* 8.5*   ALBUMIN 2.3* 2.3*   ANIONGAP 9 7*   ESTGFRAFRICA 12* 21*   EGFRNONAA 10* 18*   , CBC   Recent Labs   Lab 11/18/18  0510 11/19/18  0437   WBC 9.94 10.22   HGB 8.7* 9.3*   HCT 25.9* 28.2*    269   , INR No results for input(s): INR, PROTIME in the last 48 hours., Lipid Panel No results for input(s): CHOL, HDL, LDLCALC, TRIG, CHOLHDL in the last 48 hours. and  Troponin No results for input(s): TROPONINI in the last 48 hours.    Significant Imaging: EKG:      Assessment and Plan:       Acute on chronic renal failure    -Likely from over diuresis. Patient needing aggressive IV hydration , but needs close monitoring for fluid overload.   -Will diurese later if needed      Chronic combined systolic and diastolic congestive heart failure    -Patient echo in Oct 2018 shows Moderately depressed left ventricular systolic function (EF 30-35% and RV dysfunction     -Continue IV hydration and will address overload if needed later   -Continue BB  -OK to give Hydralazine if needed for BP control since ACEI DC'd            VTE Risk Mitigation (From admission, onward)        Ordered     enoxaparin injection 30 mg  Daily      11/19/18 0900     Place sequential compression device  Until discontinued      11/15/18 2006     IP VTE HIGH RISK PATIENT  Once      11/15/18 2006          Gui Hannon MD  Cardiology  [facility]

## 2018-11-19 NOTE — SUBJECTIVE & OBJECTIVE
Interval History:       11/19/18: Patient reports intermittent abdominal pain, none at present.         Review of patient's allergies indicates:  No Known Allergies  Current Facility-Administered Medications   Medication Frequency    0.9%  NaCl infusion (for blood administration) Q24H PRN    0.9%  NaCl infusion Continuous    acetaminophen tablet 650 mg Q8H PRN    allopurinol tablet 100 mg Daily    carvedilol tablet 12.5 mg BID    ciprofloxacin HCl tablet 500 mg Daily    dextrose 50% injection 12.5 g PRN    dextrose 50% injection 25 g PRN    enoxaparin injection 30 mg Daily    glucagon (human recombinant) injection 1 mg PRN    glucose chewable tablet 16 g PRN    glucose chewable tablet 24 g PRN    insulin aspart U-100 pen 1-10 Units QID (AC + HS) PRN    lidocaine (PF) 10 mg/ml (1%) injection 10 mg Once    loperamide capsule 2 mg QID (PC + HS)    metroNIDAZOLE tablet 500 mg Q8H    ondansetron injection 4 mg Q8H PRN    pantoprazole EC tablet 40 mg Daily    psyllium husk packet 6 g Daily    simvastatin tablet 10 mg QHS       Objective:     Vital Signs (Most Recent):  Temp: 98.7 °F (37.1 °C) (11/19/18 1225)  Pulse: 90 (11/19/18 1225)  Resp: 18 (11/19/18 1225)  BP: 131/73 (11/19/18 1225)  SpO2: 98 % (11/19/18 1225)  O2 Device (Oxygen Therapy): room air (11/19/18 1225) Vital Signs (24h Range):  Temp:  [97.5 °F (36.4 °C)-99 °F (37.2 °C)] 98.7 °F (37.1 °C)  Pulse:  [67-90] 90  Resp:  [16-20] 18  SpO2:  [97 %-99 %] 98 %  BP: ()/(55-73) 131/73     Weight: 92.2 kg (203 lb 4.2 oz) (11/19/18 0416)  Body mass index is 30.91 kg/m².  Body surface area is 2.1 meters squared.    I/O last 3 completed shifts:  In: 1101.7 [P.O.:720; Blood:381.7]  Out: 5050 [Urine:2575; Drains:150; Other:50; Stool:2275]    Physical Exam   Constitutional: He appears well-developed. He is cooperative.   HENT:   Head: Normocephalic.   Nose: No rhinorrhea.   Mouth/Throat: Mucous membranes are normal. No oropharyngeal exudate.   Eyes:    Chronic right eye lesion.    Neck: No thyroid mass present.   Cardiovascular: Normal rate, regular rhythm, S1 normal, S2 normal and intact distal pulses.   Pulmonary/Chest: Effort normal. No respiratory distress. He has no wheezes.   Abdominal: Soft. Bowel sounds are normal. He exhibits no distension. There is no tenderness. No hernia.   Ileostomy bag in place, abdominal drain and abdominal wound vac in place.    Lymphadenopathy:     He has no cervical adenopathy.   Neurological: He is alert.   Skin: Skin is warm and dry.       Significant Labs:  Lab Results   Component Value Date    CREATININE 3.4 (H) 11/19/2018    BUN 54 (H) 11/19/2018     (L) 11/19/2018    K 4.5 11/19/2018     11/19/2018    CO2 18 (L) 11/19/2018     Lab Results   Component Value Date    .0 (H) 08/10/2018    CALCIUM 8.5 (L) 11/19/2018    PHOS 3.7 11/19/2018    PHOS 3.7 11/19/2018     Lab Results   Component Value Date    ALBUMIN 2.3 (L) 11/19/2018     Lab Results   Component Value Date    WBC 10.22 11/19/2018    HGB 9.3 (L) 11/19/2018    HCT 28.2 (L) 11/19/2018    MCV 83 11/19/2018     11/19/2018     Recent Labs   Lab 11/19/18  0437   MG 1.7         Significant Imaging:

## 2018-11-19 NOTE — ASSESSMENT & PLAN NOTE
Patient presented with BROOKE which was likely caused by dehydration. Renal function has improved with fluids and creatinine has declined to 3.4 today. UOP was 2.1 liters over past 24 hours. No need for dialysis. Will monitor with repeat renal panel.

## 2018-11-19 NOTE — PLAN OF CARE
Problem: Pressure Ulcer Risk (Trip Scale) (Adult,Obstetrics,Pediatric)  Intervention: Turn/Reposition Often  Patient awake and alert free from falls and injury spouse at bedside, Tele monitor in place, denies pain at this time, weight shift assistance provided, wayne catheter and wound vac in place, ileostomy to RLQ with a medium amount of liquid green stool, completed 1 unit of blood,  POC reviewed with patient and spouse,   bed low locked, call light within reach, will continue to monitor

## 2018-11-19 NOTE — PROGRESS NOTES
Pharmacist Renal Dose Adjustment Note    Yan Choudhury is a 63 y.o. male being treated with the medication Cipro for intra-abdominal infection.    Patient Data:    Vital Signs (Most Recent):  Temp: 97.9 °F (36.6 °C) (11/18/18 1818)  Pulse: 67 (11/18/18 1818)  Resp: 20 (11/18/18 1818)  BP: 112/61 (11/18/18 1818)  SpO2: 98 % (11/18/18 1818) Vital Signs (72h Range):  Temp:  [97.8 °F (36.6 °C)-99.1 °F (37.3 °C)]   Pulse:  [63-92]   Resp:  [14-20]   BP: ()/(48-63)   SpO2:  [95 %-99 %]      Recent Labs   Lab 11/16/18  0437 11/17/18  0442 11/18/18  0510   CREATININE 8.0*  8.0* 7.2* 5.5*     Serum creatinine: 5.5 mg/dL (H) 11/18/18 0510  Estimated creatinine clearance: 14.4 mL/min (A)    Cipro 400 mg IV every 12 hours will be decreased to 400 mg IV every 24 hours due to CrCl < 30 ml/min.    Pharmacist's Name: Katherine E Mcardle  Pharmacist's Extension: [phone]

## 2018-11-19 NOTE — PROGRESS NOTES
[facility] -   General Surgery  Progress Note    Subjective:     History of Present Illness:  Patient is a 63 y.o. male presents after a colostomy reversal, repair of parastomal hernia and protective ileostomy.     The patient states he is doing okay but he is a bit tired and fatigued he states that purulent material started coming out of his drain about 2 days ago.  He denies any fever or chills.  He states that his ileostomy output is more formed.  The patient was admitted from clinic once we noticed that his BUN and creatinine were significantly elevated        Post-Op Info:  * No surgery found *         Interval History:  Patient states he is feeling better.  His acquiring about discharge.  He did have 2 L of ileostomy output    Medications:  Continuous Infusions:   sodium chloride 0.9% 100 mL/hr at 11/19/18 0203     Scheduled Meds:   allopurinol  100 mg Oral Daily    carvedilol  12.5 mg Oral BID    ciprofloxacin (CIPRO)400mg/200ml D5W IVPB  400 mg Intravenous Q24H    lidocaine (PF) 10 mg/ml (1%)  1 mL Other Once    loperamide  2 mg Oral QID (PC + HS)    metronidazole  500 mg Intravenous Q8H    pantoprazole  40 mg Oral Daily    psyllium husk  1 packet Oral Daily    simvastatin  10 mg Oral QHS     PRN Meds:sodium chloride, acetaminophen, dextrose 50%, dextrose 50%, glucagon (human recombinant), glucose, glucose, insulin aspart U-100, ondansetron     Review of patient's allergies indicates:  No Known Allergies  Objective:     Vital Signs (Most Recent):  Temp: 98.6 °F (37 °C) (11/19/18 0707)  Pulse: 76 (11/19/18 0707)  Resp: 18 (11/19/18 0707)  BP: (!) 107/58 (11/19/18 0707)  SpO2: 98 % (11/19/18 0707) Vital Signs (24h Range):  Temp:  [97.5 °F (36.4 °C)-99 °F (37.2 °C)] 98.6 °F (37 °C)  Pulse:  [67-80] 76  Resp:  [16-20] 18  SpO2:  [97 %-99 %] 98 %  BP: ()/(55-66) 107/58     Weight: 92.2 kg (203 lb 4.2 oz)  Body mass index is 30.91 kg/m².    Intake/Output - Last 3 Shifts       11/17  0700 - 11/18 0659 11/18 0700 - 11/19 0659 11/19 0700 - 11/20 0659    P.O. 240 720     I.V. (mL/kg) 1200 (13.7)      Blood  381.7     IV Piggyback 400      Total Intake(mL/kg) 1840 (21.1) 1101.7 (11.9)     Urine (mL/kg/hr) 900 (0.4) 2150 (1)     Drains 75 125     Other 0 50     Stool 2125 1600     Total Output 3100 3925     Net -1260 -2823.3                  Physical Exam   Constitutional: He is oriented to person, place, and time. No distress.   Now more chronically then acutely ill   HENT:   Head: Normocephalic.   Neck: Normal range of motion. Neck supple.   Cardiovascular: Normal rate, regular rhythm and normal heart sounds.   Pulmonary/Chest: Effort normal and breath sounds normal.   Abdominal: Soft. Bowel sounds are normal.   Right-sided ileostomy.  The stool was becoming more formed.  Drain in the pelvis with purulent output.  Wound VAC on the left side   Neurological: He is alert and oriented to person, place, and time.   Skin: Skin is warm.   Psychiatric: He has a normal mood and affect. His behavior is normal. Judgment and thought content normal.   Nursing note and vitals reviewed.      Significant Labs:  CBC:   Recent Labs   Lab 11/19/18 0437   WBC 10.22   RBC 3.40*   HGB 9.3*   HCT 28.2*      MCV 83   MCH 27.4   MCHC 33.0     BMP:   Recent Labs   Lab 11/19/18 0437   *   *   K 4.5      CO2 18*   BUN 54*   CREATININE 3.4*   CALCIUM 8.5*   MG 1.7     CMP:   Recent Labs   Lab 11/16/18 0437 11/19/18 0437   *  144*   < > 121*   CALCIUM 9.1  9.1   < > 8.5*   ALBUMIN 2.6*  2.6*   < > 2.3*   PROT 7.2  --   --    *  132*   < > 134*   K 4.1  4.1   < > 4.5   CO2 18*  18*   < > 18*     100   < > 109   BUN 72*  72*   < > 54*   CREATININE 8.0*  8.0*   < > 3.4*   ALKPHOS 83  --   --    ALT 51*  --   --    AST 50*  --   --    BILITOT 0.9  --   --     < > = values in this interval not displayed.       Significant Diagnostics:  None    Assessment/Plan:     * Open  wound of anterior abdominal wall with complication    Wound VAC, wound VAC changes Monday Wednesday and Friday.  Wound care consult      Gastrointestinal anastomotic leak    No acute surgical intervention at this time  Continue KINDRA drain to bulb sxn  Will attempt to allow leak to naturally heal with fecal diversion with ileostomy     Acute on chronic renal failure    Management per Nephrology consult     Ileostomy in place    He is on Metamucil and loperamide Q 6 hr.  The ileostomy output is becoming firm.    I feel a needs to have less than 1 L a day of ileostomy output before discharge.  The patient and his wife would need to have a better understanding of collecting and monitoring the ileostomy output     Chronic combined systolic and diastolic congestive heart failure    Cardiology and Hospital Medicine consulted  Transfuse 1unit pRBC today per cards recs     Gastroesophageal reflux disease    Continue PPI      Type 2 diabetes mellitus with stage 4 chronic kidney disease, with long-term current use of insulin    Hospital medicine and nephrology consulted      Ischemic cardiomyopathy    Cardiology consult for further management  He was transfused 1 unit of packed red blood cells.     Hypertension associated with diabetes    Hospital Medicine consult for management          Kevin Lindsay MD  General Surgery  [facility] -

## 2018-11-19 NOTE — ASSESSMENT & PLAN NOTE
He is on Metamucil and loperamide Q 6 hr.  The ileostomy output is becoming firm.    I feel a needs to have less than 1 L a day of ileostomy output before discharge.  The patient and his wife would need to have a better understanding of collecting and monitoring the ileostomy output

## 2018-11-19 NOTE — SUBJECTIVE & OBJECTIVE
Interval History:  Patient states he is feeling better.  His acquiring about discharge.  He did have 2 L of ileostomy output    Medications:  Continuous Infusions:   sodium chloride 0.9% 100 mL/hr at 11/19/18 0203     Scheduled Meds:   allopurinol  100 mg Oral Daily    carvedilol  12.5 mg Oral BID    ciprofloxacin (CIPRO)400mg/200ml D5W IVPB  400 mg Intravenous Q24H    lidocaine (PF) 10 mg/ml (1%)  1 mL Other Once    loperamide  2 mg Oral QID (PC + HS)    metronidazole  500 mg Intravenous Q8H    pantoprazole  40 mg Oral Daily    psyllium husk  1 packet Oral Daily    simvastatin  10 mg Oral QHS     PRN Meds:sodium chloride, acetaminophen, dextrose 50%, dextrose 50%, glucagon (human recombinant), glucose, glucose, insulin aspart U-100, ondansetron     Review of patient's allergies indicates:  No Known Allergies  Objective:     Vital Signs (Most Recent):  Temp: 98.6 °F (37 °C) (11/19/18 0707)  Pulse: 76 (11/19/18 0707)  Resp: 18 (11/19/18 0707)  BP: (!) 107/58 (11/19/18 0707)  SpO2: 98 % (11/19/18 0707) Vital Signs (24h Range):  Temp:  [97.5 °F (36.4 °C)-99 °F (37.2 °C)] 98.6 °F (37 °C)  Pulse:  [67-80] 76  Resp:  [16-20] 18  SpO2:  [97 %-99 %] 98 %  BP: ()/(55-66) 107/58     Weight: 92.2 kg (203 lb 4.2 oz)  Body mass index is 30.91 kg/m².    Intake/Output - Last 3 Shifts       11/17 0700 - 11/18 0659 11/18 0700 - 11/19 0659 11/19 0700 - 11/20 0659    P.O. 240 720     I.V. (mL/kg) 1200 (13.7)      Blood  381.7     IV Piggyback 400      Total Intake(mL/kg) 1840 (21.1) 1101.7 (11.9)     Urine (mL/kg/hr) 900 (0.4) 2150 (1)     Drains 75 125     Other 0 50     Stool 2125 1600     Total Output 3100 3925     Net -4206 -4683.3                  Physical Exam   Constitutional: He is oriented to person, place, and time. No distress.   Now more chronically then acutely ill   HENT:   Head: Normocephalic.   Neck: Normal range of motion. Neck supple.   Cardiovascular: Normal rate, regular rhythm and normal heart  sounds.   Pulmonary/Chest: Effort normal and breath sounds normal.   Abdominal: Soft. Bowel sounds are normal.   Right-sided ileostomy.  The stool was becoming more formed.  Drain in the pelvis with purulent output.  Wound VAC on the left side   Neurological: He is alert and oriented to person, place, and time.   Skin: Skin is warm.   Psychiatric: He has a normal mood and affect. His behavior is normal. Judgment and thought content normal.   Nursing note and vitals reviewed.      Significant Labs:  CBC:   Recent Labs   Lab 11/19/18 0437   WBC 10.22   RBC 3.40*   HGB 9.3*   HCT 28.2*      MCV 83   MCH 27.4   MCHC 33.0     BMP:   Recent Labs   Lab 11/19/18 0437   *   *   K 4.5      CO2 18*   BUN 54*   CREATININE 3.4*   CALCIUM 8.5*   MG 1.7     CMP:   Recent Labs   Lab 11/16/18 0437 11/19/18 0437   *  144*   < > 121*   CALCIUM 9.1  9.1   < > 8.5*   ALBUMIN 2.6*  2.6*   < > 2.3*   PROT 7.2  --   --    *  132*   < > 134*   K 4.1  4.1   < > 4.5   CO2 18*  18*   < > 18*     100   < > 109   BUN 72*  72*   < > 54*   CREATININE 8.0*  8.0*   < > 3.4*   ALKPHOS 83  --   --    ALT 51*  --   --    AST 50*  --   --    BILITOT 0.9  --   --     < > = values in this interval not displayed.       Significant Diagnostics:  None

## 2018-11-19 NOTE — PLAN OF CARE
Problem: Patient Care Overview  Goal: Plan of Care Review  Outcome: Ongoing (interventions implemented as appropriate)  Plan of care reviewed with the patient, patient verbalizes understanding.  Patient remains free from falls, call light within reach.  Patient remains on room air and on telemetry in a paced rhythm.

## 2018-11-19 NOTE — ASSESSMENT & PLAN NOTE
-Patient echo in Oct 2018 shows Moderately depressed left ventricular systolic function (EF 30-35% and RV dysfunction     -Continue IV hydration and will address overload if needed later   -Continue BB  -OK to give Hydralazine if needed for BP control since ACEI DC'd

## 2018-11-19 NOTE — SUBJECTIVE & OBJECTIVE
Interval History: Pt seen and examined. Wife at bedside. Pt has no complaints at this time.     Review of Systems   Constitutional: Negative for chills, diaphoresis, fatigue and fever.   HENT: Negative for congestion, sore throat and voice change.    Eyes: Negative for photophobia and visual disturbance.   Respiratory: Negative for cough, shortness of breath, wheezing and stridor.    Cardiovascular: Negative for chest pain and leg swelling.   Gastrointestinal: Negative for abdominal distention, abdominal pain, constipation, diarrhea, nausea and vomiting.        S/p abdominal surgery     Endocrine: Negative for polydipsia, polyphagia and polyuria.   Genitourinary: Negative for difficulty urinating, dysuria, flank pain, testicular pain and urgency.   Musculoskeletal: Negative for back pain, joint swelling, neck pain and neck stiffness.   Skin: Positive for wound. Negative for color change and rash.   Allergic/Immunologic: Negative for immunocompromised state.   Neurological: Negative for dizziness, syncope, weakness, numbness and headaches.   Hematological: Does not bruise/bleed easily.   Psychiatric/Behavioral: Negative for agitation, behavioral problems and confusion.     Objective:     Vital Signs (Most Recent):  Temp: 98.7 °F (37.1 °C) (11/19/18 1225)  Pulse: 90 (11/19/18 1225)  Resp: 18 (11/19/18 1225)  BP: 131/73 (11/19/18 1225)  SpO2: 98 % (11/19/18 1225) Vital Signs (24h Range):  Temp:  [97.5 °F (36.4 °C)-99 °F (37.2 °C)] 98.7 °F (37.1 °C)  Pulse:  [67-90] 90  Resp:  [16-20] 18  SpO2:  [97 %-99 %] 98 %  BP: (106-131)/(58-73) 131/73     Weight: 92.2 kg (203 lb 4.2 oz)  Body mass index is 30.91 kg/m².    Intake/Output Summary (Last 24 hours) at 11/19/2018 1536  Last data filed at 11/19/2018 1521  Gross per 24 hour   Intake 821.67 ml   Output 2265 ml   Net -1443.33 ml      Physical Exam   Constitutional: He is oriented to person, place, and time. He appears well-developed. No distress.   HENT:   Head:  Normocephalic.   Eyes: EOM are normal. Pupils are equal, round, and reactive to light.   Neck: Normal range of motion. Neck supple. No JVD present. No thyromegaly present.   Cardiovascular: Normal rate, regular rhythm, S1 normal, S2 normal, normal heart sounds and intact distal pulses. PMI is not displaced. Exam reveals no gallop and no friction rub.   No murmur heard.  Pulmonary/Chest: Effort normal and breath sounds normal. No respiratory distress. He has no wheezes. He has no rales. He exhibits no tenderness.   Abdominal: Soft. He exhibits no distension and no mass. There is no hepatosplenomegaly. There is no tenderness. There is no rebound and no CVA tenderness. No hernia.    ileostomy in place (stool becoming more formed), wound VAC  LLQ, KINDRA drain with purulent drainage   Genitourinary:   Genitourinary Comments: Guzman catheter   Musculoskeletal: Normal range of motion. He exhibits no edema or tenderness.   Lymphadenopathy:     He has no cervical adenopathy.   Neurological: He is alert and oriented to person, place, and time. He has normal reflexes. He is not disoriented. He displays normal reflexes. No cranial nerve deficit. He exhibits normal muscle tone. Coordination normal.   Skin: Skin is warm and dry. Capillary refill takes less than 2 seconds. No rash noted. No erythema.   Psychiatric: He has a normal mood and affect. His behavior is normal. Judgment and thought content normal.   Nursing note and vitals reviewed.      Significant Labs:   CBC:   Recent Labs   Lab 11/18/18  0510 11/19/18  0437   WBC 9.94 10.22   HGB 8.7* 9.3*   HCT 25.9* 28.2*    269     CMP:   Recent Labs   Lab 11/18/18  0510 11/19/18  0437   * 134*   K 4.2 4.5    109   CO2 18* 18*   GLU 98 121*   BUN 70* 54*   CREATININE 5.5* 3.4*   CALCIUM 8.5* 8.5*   ALBUMIN 2.3* 2.3*   ANIONGAP 9 7*   EGFRNONAA 10* 18*     Magnesium:   Recent Labs   Lab 11/18/18  0510 11/19/18  0437   MG 1.7 1.7     POCT Glucose:   Recent Labs   Lab  11/18/18  1057 11/18/18  2101 11/19/18  0531   POCTGLUCOSE 132* 128* 109       Significant Imaging:

## 2018-11-20 LAB
ALBUMIN SERPL BCP-MCNC: 2.3 G/DL
ANION GAP SERPL CALC-SCNC: 6 MMOL/L
BUN SERPL-MCNC: 37 MG/DL
CALCIUM SERPL-MCNC: 8.4 MG/DL
CHLORIDE SERPL-SCNC: 113 MMOL/L
CO2 SERPL-SCNC: 17 MMOL/L
CREAT SERPL-MCNC: 2.3 MG/DL
EST. GFR  (AFRICAN AMERICAN): 33 ML/MIN/1.73 M^2
EST. GFR  (NON AFRICAN AMERICAN): 29 ML/MIN/1.73 M^2
GLUCOSE SERPL-MCNC: 96 MG/DL
PHOSPHATE SERPL-MCNC: 2.7 MG/DL
POCT GLUCOSE: 135 MG/DL (ref 70–110)
POCT GLUCOSE: 144 MG/DL (ref 70–110)
POCT GLUCOSE: 91 MG/DL (ref 70–110)
POTASSIUM SERPL-SCNC: 4.9 MMOL/L
SODIUM SERPL-SCNC: 136 MMOL/L

## 2018-11-20 PROCEDURE — 25000003 PHARM REV CODE 250: Performed by: COLON & RECTAL SURGERY

## 2018-11-20 PROCEDURE — 97530 THERAPEUTIC ACTIVITIES: CPT | Performed by: PHYSICAL THERAPIST

## 2018-11-20 PROCEDURE — 21400001 HC TELEMETRY ROOM

## 2018-11-20 PROCEDURE — 25000003 PHARM REV CODE 250: Performed by: SURGERY

## 2018-11-20 PROCEDURE — 99233 SBSQ HOSP IP/OBS HIGH 50: CPT | Mod: ,,, | Performed by: INTERNAL MEDICINE

## 2018-11-20 PROCEDURE — 63600175 PHARM REV CODE 636 W HCPCS: Performed by: SURGERY

## 2018-11-20 PROCEDURE — 80069 RENAL FUNCTION PANEL: CPT

## 2018-11-20 PROCEDURE — 36415 COLL VENOUS BLD VENIPUNCTURE: CPT

## 2018-11-20 PROCEDURE — 97116 GAIT TRAINING THERAPY: CPT | Performed by: PHYSICAL THERAPIST

## 2018-11-20 PROCEDURE — 25000003 PHARM REV CODE 250: Performed by: NURSE PRACTITIONER

## 2018-11-20 RX ADMIN — SODIUM CHLORIDE: 0.9 INJECTION, SOLUTION INTRAVENOUS at 07:11

## 2018-11-20 RX ADMIN — SIMVASTATIN 10 MG: 5 TABLET, FILM COATED ORAL at 09:11

## 2018-11-20 RX ADMIN — METRONIDAZOLE 500 MG: 500 TABLET, FILM COATED ORAL at 05:11

## 2018-11-20 RX ADMIN — LOPERAMIDE HYDROCHLORIDE 2 MG: 2 CAPSULE ORAL at 11:11

## 2018-11-20 RX ADMIN — ALLOPURINOL 100 MG: 100 TABLET ORAL at 09:11

## 2018-11-20 RX ADMIN — ENOXAPARIN SODIUM 30 MG: 100 INJECTION SUBCUTANEOUS at 05:11

## 2018-11-20 RX ADMIN — CARVEDILOL 12.5 MG: 12.5 TABLET, FILM COATED ORAL at 09:11

## 2018-11-20 RX ADMIN — SODIUM CHLORIDE: 0.9 INJECTION, SOLUTION INTRAVENOUS at 09:11

## 2018-11-20 RX ADMIN — METRONIDAZOLE 500 MG: 500 TABLET, FILM COATED ORAL at 02:11

## 2018-11-20 RX ADMIN — LOPERAMIDE HYDROCHLORIDE 2 MG: 2 CAPSULE ORAL at 02:11

## 2018-11-20 RX ADMIN — CIPROFLOXACIN HYDROCHLORIDE 500 MG: 500 TABLET, FILM COATED ORAL at 09:11

## 2018-11-20 RX ADMIN — METRONIDAZOLE 500 MG: 500 TABLET, FILM COATED ORAL at 09:11

## 2018-11-20 RX ADMIN — PANTOPRAZOLE SODIUM 40 MG: 40 TABLET, DELAYED RELEASE ORAL at 09:11

## 2018-11-20 RX ADMIN — LOPERAMIDE HYDROCHLORIDE 2 MG: 2 CAPSULE ORAL at 09:11

## 2018-11-20 RX ADMIN — PSYLLIUM HUSK 6 G: 6 GRANULE ORAL at 09:11

## 2018-11-20 NOTE — SUBJECTIVE & OBJECTIVE
Interval History:       11/19/18: Patient reports intermittent abdominal pain, none at present.     11/20/18: Patient without issues/complaints at present.         Review of patient's allergies indicates:  No Known Allergies  Current Facility-Administered Medications   Medication Frequency    0.9%  NaCl infusion (for blood administration) Q24H PRN    0.9%  NaCl infusion Continuous    acetaminophen tablet 650 mg Q8H PRN    allopurinol tablet 100 mg Daily    carvedilol tablet 12.5 mg BID    ciprofloxacin HCl tablet 500 mg Daily    dextrose 50% injection 12.5 g PRN    dextrose 50% injection 25 g PRN    enoxaparin injection 30 mg Daily    glucagon (human recombinant) injection 1 mg PRN    glucose chewable tablet 16 g PRN    glucose chewable tablet 24 g PRN    hydrALAZINE injection 10 mg Q8H PRN    insulin aspart U-100 pen 1-10 Units QID (AC + HS) PRN    lidocaine (PF) 10 mg/ml (1%) injection 10 mg Once    loperamide capsule 2 mg QID (PC + HS)    metroNIDAZOLE tablet 500 mg Q8H    ondansetron injection 4 mg Q8H PRN    pantoprazole EC tablet 40 mg Daily    psyllium husk packet 6 g Daily    simvastatin tablet 10 mg QHS       Objective:     Vital Signs (Most Recent):  Temp: 98.9 °F (37.2 °C) (11/20/18 1150)  Pulse: 97 (11/20/18 1154)  Resp: 20 (11/20/18 1150)  BP: 115/68 (11/20/18 1150)  SpO2: 97 % (11/20/18 1150)  O2 Device (Oxygen Therapy): room air (11/20/18 0312) Vital Signs (24h Range):  Temp:  [98.3 °F (36.8 °C)-100.1 °F (37.8 °C)] 98.9 °F (37.2 °C)  Pulse:  [74-97] 97  Resp:  [18-20] 20  SpO2:  [96 %-99 %] 97 %  BP: (104-118)/(57-68) 115/68     Weight: 92.4 kg (203 lb 11.3 oz) (11/20/18 0600)  Body mass index is 30.97 kg/m².  Body surface area is 2.11 meters squared.    I/O last 3 completed shifts:  In: 2400 [P.O.:720; I.V.:1098.3; Blood:381.7; Other:200]  Out: 3945 [Urine:2600; Drains:155; Other:50; Stool:1140]    Physical Exam   Constitutional: He appears well-developed. He is cooperative.    HENT:   Head: Normocephalic.   Nose: No rhinorrhea.   Mouth/Throat: Mucous membranes are normal. No oropharyngeal exudate.   Eyes:   Chronic right eye lesion.    Neck: No thyroid mass present.   Cardiovascular: Normal rate, regular rhythm, S1 normal, S2 normal and intact distal pulses.   Pulmonary/Chest: Effort normal. No respiratory distress. He has no wheezes.   Abdominal: Soft. Bowel sounds are normal. He exhibits no distension. There is no tenderness. No hernia.   Ileostomy bag in place, abdominal drain and abdominal wound vac in place.    Lymphadenopathy:     He has no cervical adenopathy.   Neurological: He is alert.   Skin: Skin is warm and dry.       Significant Labs:  Lab Results   Component Value Date    CREATININE 2.3 (H) 11/20/2018    BUN 37 (H) 11/20/2018     11/20/2018    K 4.9 11/20/2018     (H) 11/20/2018    CO2 17 (L) 11/20/2018     Lab Results   Component Value Date    .0 (H) 08/10/2018    CALCIUM 8.4 (L) 11/20/2018    PHOS 2.7 11/20/2018     Lab Results   Component Value Date    ALBUMIN 2.3 (L) 11/20/2018     Lab Results   Component Value Date    WBC 10.22 11/19/2018    HGB 9.3 (L) 11/19/2018    HCT 28.2 (L) 11/19/2018    MCV 83 11/19/2018     11/19/2018     Recent Labs   Lab 11/19/18  0437   MG 1.7         Significant Imaging:

## 2018-11-20 NOTE — ASSESSMENT & PLAN NOTE
Management per Nephrology consult  BUN and creatinine are improving.    Continue IV fluids per Nephrology

## 2018-11-20 NOTE — ASSESSMENT & PLAN NOTE
- HgbA1C -- 5.6 -- controlled  - Glucose stable. Continue accuchecks and moderate dose SSI  - BP improving. Continue Coreg with holding parameters  - Hydralazine PRN

## 2018-11-20 NOTE — PHYSICIAN QUERY
"PT Name: Yan Choudhury  MR #: [id number]    Physician Query Form - Nutrition Clarification     CDS/: Keli Leary               Contact information: [email or web address]    This form is a permanent document in the medical record.     Query Date: November 20, 2018    By submitting this query, we are merely seeking further clarification of documentation.. Please utilize your independent clinical judgment when addressing the question(s) below.    The Medical record contains the following:   Indicators  Supporting Clinical Findings Location in Medical Record    % of Estimated Energy Intake over a time frame from p.o., TF, or TPN     x Weight Status over a time frame Weight loss of 7 lb in the last 1 week.  Patient was discharged home at about 204 lb and now today he weighed 197 lb   Nephrology Consult 11/15   x Subcutaneous Fat and/or Muscle Loss Some muscle wasting, signs of dehydration, malnutrition      Nephrology Consult 11/15    Fluid Accumulation or Edema      Reduced  Strength     x Wt / BMI / Usual Body Weight Height 5' 8" (1.727 m)   Weight 89.7 kg (197 lb 11.2 oz)   BMI 30.1      Anthropometrics 11/15    Delayed Wound Healing / Failure to Thrive     x Acute or Chronic Illness Positive for activity change, appetite change, fatigue and unexpected weight change    Acute on chronic renal failure  congestive heart failure managed on Bumex and Zaroxolyn  Dehydration    Presents after a colostomy reversal, repair of parastomal hernia and protective ileostomy  Purulent material started coming out of his drain about 2 days ago  Wound Vac    Open wound of anterior abdominal wall with complication  Gastrointestinal anastomotic leak  Type 2 diabetes mellitus with stage 4 chronic kidney disease, with long-term current use of insulin     Nephrology Consult 11/15              General Surgery H&P            General Surgery    Medication      Treatment     x Other Albumin= 2.6 - 2.3 Chem Pofile Labs 11/16 - 11/20 "     AND / ASPEN Clinical Characteristics (October 2011)  A minimum of two characteristics is recommended for diagnosing either moderate or severe malnutrition   Mild Malnutrition Moderate Malnutrition Severe Malnutrition   Energy Intake from p.o., TF or TPN. < 75% intake of estimated energy needs for less than 7 days < 75% intake of estimated energy needs for greater than 7 days < 50% intake of estimated energy needs for > 5 days   Weight Loss 1-2% in 1 month  5% in 3 months  7.5% in 6 months  10% in 1 year 1-2 % in 1 week  5% in 1 month  7.5% in 3 months  10% in 6 months  20% in 1 year > 2% in 1 week  > 5% in 1 month  > 7.5% in 3 months  > 10% in 6 months  > 20% in 1 year   Physical Findings     None *Mild subcutaneous fat and/or muscle loss  *Mild fluid accumulation  *Stage II decubitus  *Surgical wound or non-healing wound *Mod/severe subcutaneous fat and/or muscle loss  *Mod/severe fluid accumulation  *Stage III or IV decubitus  *Non-healing surgical wound     Provider, please specify diagnosis or diagnoses associated with above clinical findings.    x Mild Protein-Calorie Malnutrition    Moderate Protein-Calorie Malnutrition    Abnormal Weight Loss    Other Nutritional Diagnosis (please specify):     Other:     Clinically Undetermined       Please document in your progress notes daily for the duration of treatment until resolved and include in your discharge summary.

## 2018-11-20 NOTE — ASSESSMENT & PLAN NOTE
He is on Metamucil and 2 mg loperamide Q 6 hr.  The ileostomy output needs to be monitored to ensure it is becoming semi soft      I feel a needs to have less than 1 L a day of ileostomy output before discharge.  The patient and his wife would need to have a better understanding of collecting and monitoring the ileostomy output

## 2018-11-20 NOTE — SUBJECTIVE & OBJECTIVE
Interval History:  Patient is feeling better.  Output from the colostomy is becoming more solid, drain output is slightly clear.    Medications:  Continuous Infusions:   sodium chloride 0.9% 100 mL/hr at 11/20/18 0937     Scheduled Meds:   allopurinol  100 mg Oral Daily    carvedilol  12.5 mg Oral BID    ciprofloxacin HCl  500 mg Oral Daily    enoxaparin  30 mg Subcutaneous Daily    lidocaine (PF) 10 mg/ml (1%)  1 mL Other Once    loperamide  2 mg Oral QID (PC + HS)    metroNIDAZOLE  500 mg Oral Q8H    pantoprazole  40 mg Oral Daily    psyllium husk  1 packet Oral Daily    simvastatin  10 mg Oral QHS     PRN Meds:sodium chloride, acetaminophen, dextrose 50%, dextrose 50%, glucagon (human recombinant), glucose, glucose, hydrALAZINE, insulin aspart U-100, ondansetron     Review of patient's allergies indicates:  No Known Allergies  Objective:     Vital Signs (Most Recent):  Temp: 98.3 °F (36.8 °C) (11/20/18 0800)  Pulse: 86 (11/20/18 0800)  Resp: 18 (11/20/18 0800)  BP: 112/62 (11/20/18 0800)  SpO2: 97 % (11/20/18 0800) Vital Signs (24h Range):  Temp:  [98.3 °F (36.8 °C)-100.1 °F (37.8 °C)] 98.3 °F (36.8 °C)  Pulse:  [74-97] 86  Resp:  [18] 18  SpO2:  [96 %-99 %] 97 %  BP: (104-131)/(57-73) 112/62     Weight: 92.4 kg (203 lb 11.3 oz)  Body mass index is 30.97 kg/m².    Intake/Output - Last 3 Shifts       11/18 0700 - 11/19 0659 11/19 0700 - 11/20 0659 11/20 0700 - 11/21 0659    P.O. 720 480     I.V. (mL/kg)  1098.3 (11.9)     Blood 381.7      Other  200     IV Piggyback       Total Intake(mL/kg) 1101.7 (11.9) 1778.3 (19.2)     Urine (mL/kg/hr) 2150 (1) 1600 (0.7)     Drains 125 105     Other 50      Stool 1600 740 150    Total Output 3925 2445 150    Net -2823.3 -666.7 -150                 Physical Exam   Constitutional: He is oriented to person, place, and time. No distress.   Chronically ill   HENT:   Head: Normocephalic.   Neck: Neck supple.   Cardiovascular: Normal rate, regular rhythm and normal heart  sounds.   Pulmonary/Chest: Effort normal and breath sounds normal.   Abdominal: Soft.   Right upper quadrant ileostomy, left-sided wound VAC, drain with slightly purulent output, the drain was flushed   Neurological: He is alert and oriented to person, place, and time.   Skin: Skin is warm.   Psychiatric: He has a normal mood and affect. His behavior is normal. Thought content normal.   Vitals reviewed.      Significant Labs:  CBC:   Recent Labs   Lab 11/19/18 0437   WBC 10.22   RBC 3.40*   HGB 9.3*   HCT 28.2*      MCV 83   MCH 27.4   MCHC 33.0     BMP:   Recent Labs   Lab 11/19/18 0437 11/20/18  0435   * 96   * 136   K 4.5 4.9    113*   CO2 18* 17*   BUN 54* 37*   CREATININE 3.4* 2.3*   CALCIUM 8.5* 8.4*   MG 1.7  --        Significant Diagnostics:  None

## 2018-11-20 NOTE — SUBJECTIVE & OBJECTIVE
Interval History: Pt seen and examined. Wife at bedside. Pt has no complaints at this time.     Review of Systems   Constitutional: Negative for chills, diaphoresis, fatigue and fever.   HENT: Negative for congestion, sore throat and voice change.    Eyes: Negative for photophobia and visual disturbance.   Respiratory: Negative for cough, shortness of breath, wheezing and stridor.    Cardiovascular: Negative for chest pain and leg swelling.   Gastrointestinal: Negative for abdominal distention, abdominal pain, constipation, diarrhea, nausea and vomiting.        S/p abdominal surgery     Endocrine: Negative for polydipsia, polyphagia and polyuria.   Genitourinary: Negative for difficulty urinating, dysuria, flank pain, testicular pain and urgency.   Musculoskeletal: Negative for back pain, joint swelling, neck pain and neck stiffness.   Skin: Positive for wound. Negative for color change and rash.   Allergic/Immunologic: Negative for immunocompromised state.   Neurological: Negative for dizziness, syncope, weakness, numbness and headaches.   Hematological: Does not bruise/bleed easily.   Psychiatric/Behavioral: Negative for agitation, behavioral problems and confusion.     Objective:     Vital Signs (Most Recent):  Temp: 98.9 °F (37.2 °C) (11/20/18 1150)  Pulse: 97 (11/20/18 1154)  Resp: 20 (11/20/18 1150)  BP: 115/68 (11/20/18 1150)  SpO2: 97 % (11/20/18 1150) Vital Signs (24h Range):  Temp:  [98.3 °F (36.8 °C)-100.1 °F (37.8 °C)] 98.9 °F (37.2 °C)  Pulse:  [74-97] 97  Resp:  [18-20] 20  SpO2:  [96 %-99 %] 97 %  BP: (104-118)/(57-68) 115/68     Weight: 92.4 kg (203 lb 11.3 oz)  Body mass index is 30.97 kg/m².    Intake/Output Summary (Last 24 hours) at 11/20/2018 1251  Last data filed at 11/20/2018 1200  Gross per 24 hour   Intake 1578.33 ml   Output 2565 ml   Net -986.67 ml      Physical Exam   Constitutional: He is oriented to person, place, and time. He appears well-developed. No distress.   HENT:   Head:  Normocephalic.   Eyes: EOM are normal. Pupils are equal, round, and reactive to light.   Neck: Normal range of motion. Neck supple. No JVD present. No thyromegaly present.   Cardiovascular: Normal rate, regular rhythm, S1 normal, S2 normal, normal heart sounds and intact distal pulses. PMI is not displaced. Exam reveals no gallop and no friction rub.   No murmur heard.  Pulmonary/Chest: Effort normal and breath sounds normal. No respiratory distress. He has no wheezes. He has no rales. He exhibits no tenderness.   Abdominal: Soft. He exhibits no distension and no mass. There is no hepatosplenomegaly. There is no tenderness. There is no rebound and no CVA tenderness. No hernia.    ileostomy in place (more liquid consistency today), wound VAC  LLQ, KINDRA drain with purulent drainage   Genitourinary:   Genitourinary Comments: Guzman catheter   Musculoskeletal: Normal range of motion. He exhibits no edema or tenderness.   Lymphadenopathy:     He has no cervical adenopathy.   Neurological: He is alert and oriented to person, place, and time. He has normal reflexes. He is not disoriented. He displays normal reflexes. No cranial nerve deficit. He exhibits normal muscle tone. Coordination normal.   Skin: Skin is warm and dry. Capillary refill takes less than 2 seconds. No rash noted. No erythema.   Psychiatric: He has a normal mood and affect. His behavior is normal. Judgment and thought content normal.   Nursing note and vitals reviewed.      Significant Labs:   BMP:   Recent Labs   Lab 11/19/18  0437 11/20/18  0435   * 96   * 136   K 4.5 4.9    113*   CO2 18* 17*   BUN 54* 37*   CREATININE 3.4* 2.3*   CALCIUM 8.5* 8.4*   MG 1.7  --      POCT Glucose:   Recent Labs   Lab 11/19/18  0531 11/19/18 2022 11/20/18  1212   POCTGLUCOSE 109 113* 144*       Significant Imaging:

## 2018-11-20 NOTE — PROGRESS NOTES
[facility] -   Nephrology  Progress Note    Patient Name: Yan Choudhury  MRN: [id number]  Admission Date: 11/15/2018  Hospital Length of Stay: 5 days  Attending Provider: Kevin Lindsay MD   Primary Care Physician: Sandra Estevez MD  Principal Problem:Open wound of anterior abdominal wall with complication    Subjective:     HPI: Patient is a 63-year-old male with history of chronic kidney disease followed by our division in the outpatient clinic.  Patient has been seen by Dr. Seth and Dr. Juan Luis Galeas in the past.  Patient has had history of diverticular perforation requiring colostomy.  Patient is status post colostomy reversal as well as repair of paraostomy hernia repair.  Patient was recently hospitalized with dehydration.  Patient also has history of congestive heart failure followed by Dr. Calvillo as an outpatient.  Patient takes metolazone as an outpatient for diuretics.  Patient was seen by Dr. Lindsay as an outpatient today and was admitted for dehydration.  Patient's baseline creatinine ranged between 1.8 and 2.1.  Patient never had any proteinuria.  His creatinine has gone up to 8.2 with a BUN elevation as well as hyponatremia.  Nephrology consultation is requested for acute kidney injury on chronic kidney disease stage 3 4/stage IV.    Interval History:       11/19/18: Patient reports intermittent abdominal pain, none at present.     11/20/18: Patient without issues/complaints at present.         Review of patient's allergies indicates:  No Known Allergies  Current Facility-Administered Medications   Medication Frequency    0.9%  NaCl infusion (for blood administration) Q24H PRN    0.9%  NaCl infusion Continuous    acetaminophen tablet 650 mg Q8H PRN    allopurinol tablet 100 mg Daily    carvedilol tablet 12.5 mg BID    ciprofloxacin HCl tablet 500 mg Daily    dextrose 50% injection 12.5 g PRN    dextrose 50% injection 25 g PRN    enoxaparin injection 30 mg Daily    glucagon  (human recombinant) injection 1 mg PRN    glucose chewable tablet 16 g PRN    glucose chewable tablet 24 g PRN    hydrALAZINE injection 10 mg Q8H PRN    insulin aspart U-100 pen 1-10 Units QID (AC + HS) PRN    lidocaine (PF) 10 mg/ml (1%) injection 10 mg Once    loperamide capsule 2 mg QID (PC + HS)    metroNIDAZOLE tablet 500 mg Q8H    ondansetron injection 4 mg Q8H PRN    pantoprazole EC tablet 40 mg Daily    psyllium husk packet 6 g Daily    simvastatin tablet 10 mg QHS       Objective:     Vital Signs (Most Recent):  Temp: 98.9 °F (37.2 °C) (11/20/18 1150)  Pulse: 97 (11/20/18 1154)  Resp: 20 (11/20/18 1150)  BP: 115/68 (11/20/18 1150)  SpO2: 97 % (11/20/18 1150)  O2 Device (Oxygen Therapy): room air (11/20/18 0312) Vital Signs (24h Range):  Temp:  [98.3 °F (36.8 °C)-100.1 °F (37.8 °C)] 98.9 °F (37.2 °C)  Pulse:  [74-97] 97  Resp:  [18-20] 20  SpO2:  [96 %-99 %] 97 %  BP: (104-118)/(57-68) 115/68     Weight: 92.4 kg (203 lb 11.3 oz) (11/20/18 0600)  Body mass index is 30.97 kg/m².  Body surface area is 2.11 meters squared.    I/O last 3 completed shifts:  In: 2400 [P.O.:720; I.V.:1098.3; Blood:381.7; Other:200]  Out: 3945 [Urine:2600; Drains:155; Other:50; Stool:1140]    Physical Exam   Constitutional: He appears well-developed. He is cooperative.   HENT:   Head: Normocephalic.   Nose: No rhinorrhea.   Mouth/Throat: Mucous membranes are normal. No oropharyngeal exudate.   Eyes:   Chronic right eye lesion.    Neck: No thyroid mass present.   Cardiovascular: Normal rate, regular rhythm, S1 normal, S2 normal and intact distal pulses.   Pulmonary/Chest: Effort normal. No respiratory distress. He has no wheezes.   Abdominal: Soft. Bowel sounds are normal. He exhibits no distension. There is no tenderness. No hernia.   Ileostomy bag in place, abdominal drain and abdominal wound vac in place.    Lymphadenopathy:     He has no cervical adenopathy.   Neurological: He is alert.   Skin: Skin is warm and dry.        Significant Labs:  Lab Results   Component Value Date    CREATININE 2.3 (H) 11/20/2018    BUN 37 (H) 11/20/2018     11/20/2018    K 4.9 11/20/2018     (H) 11/20/2018    CO2 17 (L) 11/20/2018     Lab Results   Component Value Date    .0 (H) 08/10/2018    CALCIUM 8.4 (L) 11/20/2018    PHOS 2.7 11/20/2018     Lab Results   Component Value Date    ALBUMIN 2.3 (L) 11/20/2018     Lab Results   Component Value Date    WBC 10.22 11/19/2018    HGB 9.3 (L) 11/19/2018    HCT 28.2 (L) 11/19/2018    MCV 83 11/19/2018     11/19/2018     Recent Labs   Lab 11/19/18  0437   MG 1.7         Significant Imaging:                Assessment/Plan:     Acute on chronic renal failure    Patient presented with BROOKE which was likely caused by dehydration. Renal function has improved with fluids and creatinine has declined to 2.3 today (from 3.4 yesterday). UOP was 1.6 liters over past 24 hours. No need for dialysis. Will monitor with repeat renal panel.          Thank you for your consult. I will follow-up with patient. Please contact us if you have any additional questions.    Aubrey Seth MD  Nephrology  [facility]

## 2018-11-20 NOTE — PROGRESS NOTES
[facility] Hospital Medicine  Progress Note    Patient Name: Yan Choudhury  MRN: [id number]  Patient Class: IP- Inpatient   Admission Date: 11/15/2018  Length of Stay: 5 days  Attending Physician: Kevin Lindsay MD  Primary Care Provider: Sandra Estevez MD        Subjective:     Principal Problem:Open wound of anterior abdominal wall with complication    HPI:  Patient is a 63 y.o. male presents after a colostomy reversal, repair of parastomal hernia and protective ileostomy with complaint of purulent material from the drain. Patient was direct admitted by General Surgery. Renal was consulted for Acute on chronic kidney injury. Renal Note reviewed and plans for gentle hydration overnight. HM consulted for assistance with medical management. Patient seen and examined in his room. Patient with no current complaints other than purulent drain discharge. No modifying factors, no associated symptoms. Disucssed case with Dr. Lindsay patient is to receive both flagyl and cipro and CT ABD pending. Significant medical conditions include CAD, CHF, HTN, DM2.          Hospital Course:  11/16/18 creatinine still 8. Nephrology and Cardiology assisting with management. Renal ultrasound pending. No surgical intervention indicated at present. As of 11/17 creatinine has improved from 8.0 to 7.2. No HD to be performed at this time. Renal US showed minimal increased cortical echogenicity right kidney. Hypotensive episodes overnight and systolic now 97. Pt currently taking Coreg 25 mg PO daily. Will split the dose of Coreg to 12.5 mg PO BID with holding parameters. Blood sugars controlled. As of 11/18 pt was transferred to Telemetry last night per Cardiology recommendations. Creatinine improving, currently 5.5 from 7.2 with gentle IV hydration. Leukocytosis resolved. Remains afebrile. Hx of CHF, appears compensated at this time. DM controlled. As of 11/19 creatinine continues to improve with IV hydration, currently 3.4  from 5.5. BP also improving. Pt still having moderate amt of ileostomy outpt. Per General surgery, needs to have < 1L a day of ileostomy output before discharge. As of 11/20 creatinine continues to improve, currently 2.3 from 3.4. Glucose stable. VSS. Ileostomy output was formed yesterday but upon examination today, more liquid consistency. Pt feeling better.     Interval History: Pt seen and examined. Wife at bedside. Pt has no complaints at this time.     Review of Systems   Constitutional: Negative for chills, diaphoresis, fatigue and fever.   HENT: Negative for congestion, sore throat and voice change.    Eyes: Negative for photophobia and visual disturbance.   Respiratory: Negative for cough, shortness of breath, wheezing and stridor.    Cardiovascular: Negative for chest pain and leg swelling.   Gastrointestinal: Negative for abdominal distention, abdominal pain, constipation, diarrhea, nausea and vomiting.        S/p abdominal surgery     Endocrine: Negative for polydipsia, polyphagia and polyuria.   Genitourinary: Negative for difficulty urinating, dysuria, flank pain, testicular pain and urgency.   Musculoskeletal: Negative for back pain, joint swelling, neck pain and neck stiffness.   Skin: Positive for wound. Negative for color change and rash.   Allergic/Immunologic: Negative for immunocompromised state.   Neurological: Negative for dizziness, syncope, weakness, numbness and headaches.   Hematological: Does not bruise/bleed easily.   Psychiatric/Behavioral: Negative for agitation, behavioral problems and confusion.     Objective:     Vital Signs (Most Recent):  Temp: 98.9 °F (37.2 °C) (11/20/18 1150)  Pulse: 97 (11/20/18 1154)  Resp: 20 (11/20/18 1150)  BP: 115/68 (11/20/18 1150)  SpO2: 97 % (11/20/18 1150) Vital Signs (24h Range):  Temp:  [98.3 °F (36.8 °C)-100.1 °F (37.8 °C)] 98.9 °F (37.2 °C)  Pulse:  [74-97] 97  Resp:  [18-20] 20  SpO2:  [96 %-99 %] 97 %  BP: (104-118)/(57-68) 115/68     Weight: 92.4  kg (203 lb 11.3 oz)  Body mass index is 30.97 kg/m².    Intake/Output Summary (Last 24 hours) at 11/20/2018 1251  Last data filed at 11/20/2018 1200  Gross per 24 hour   Intake 1578.33 ml   Output 2565 ml   Net -986.67 ml      Physical Exam   Constitutional: He is oriented to person, place, and time. He appears well-developed. No distress.   HENT:   Head: Normocephalic.   Eyes: EOM are normal. Pupils are equal, round, and reactive to light.   Neck: Normal range of motion. Neck supple. No JVD present. No thyromegaly present.   Cardiovascular: Normal rate, regular rhythm, S1 normal, S2 normal, normal heart sounds and intact distal pulses. PMI is not displaced. Exam reveals no gallop and no friction rub.   No murmur heard.  Pulmonary/Chest: Effort normal and breath sounds normal. No respiratory distress. He has no wheezes. He has no rales. He exhibits no tenderness.   Abdominal: Soft. He exhibits no distension and no mass. There is no hepatosplenomegaly. There is no tenderness. There is no rebound and no CVA tenderness. No hernia.    ileostomy in place (more liquid consistency today), wound VAC  LLQ, KINDRA drain with purulent drainage   Genitourinary:   Genitourinary Comments: Guzman catheter   Musculoskeletal: Normal range of motion. He exhibits no edema or tenderness.   Lymphadenopathy:     He has no cervical adenopathy.   Neurological: He is alert and oriented to person, place, and time. He has normal reflexes. He is not disoriented. He displays normal reflexes. No cranial nerve deficit. He exhibits normal muscle tone. Coordination normal.   Skin: Skin is warm and dry. Capillary refill takes less than 2 seconds. No rash noted. No erythema.   Psychiatric: He has a normal mood and affect. His behavior is normal. Judgment and thought content normal.   Nursing note and vitals reviewed.      Significant Labs:   BMP:   Recent Labs   Lab 11/19/18 0437 11/20/18  0435   * 96   * 136   K 4.5 4.9    113*   CO2  18* 17*   BUN 54* 37*   CREATININE 3.4* 2.3*   CALCIUM 8.5* 8.4*   MG 1.7  --      POCT Glucose:   Recent Labs   Lab 11/19/18  0531 11/19/18 2022 11/20/18  1212   POCTGLUCOSE 109 113* 144*       Significant Imaging:       Assessment/Plan:      * Open wound of anterior abdominal wall with complication    -General surgery managing  -PO antibiotics with Ciprofloxacin and Flagyl   -No indication for further surgical intervention for now     Gastrointestinal anastomotic leak    -General Surgery managing  -KINDRA drain in place       Acute on chronic renal failure    -Nephrology following   -Creatine currently 2.3 with metabolic acidosis. Renal ultrasound with no acute findings.   -Continue gentle hydration       Chronic combined systolic and diastolic congestive heart failure    -Currently compensated  -Monitor hydration status   -Continue BB with holding parameters     Ischemic cardiomyopathy    -Cardiology following  -He does not appear to be volume overloaded at this time  -Continue cardiac medications     Hypertension associated with diabetes    - HgbA1C -- 5.6 -- controlled  - Glucose stable. Continue accuchecks and moderate dose SSI  - BP improving. Continue Coreg with holding parameters  - Hydralazine PRN         VTE Risk Mitigation (From admission, onward)        Ordered     enoxaparin injection 30 mg  Daily      11/19/18 0900     Place sequential compression device  Until discontinued      11/15/18 2006     IP VTE HIGH RISK PATIENT  Once      11/15/18 2006              MANPREET Fuentes  Department of Hospital Medicine   [facility] [place]

## 2018-11-20 NOTE — ASSESSMENT & PLAN NOTE
Patient presented with BROOKE which was likely caused by dehydration. Renal function has improved with fluids and creatinine has declined to 2.3 today (from 3.4 yesterday). UOP was 1.6 liters over past 24 hours. No need for dialysis. Will monitor with repeat renal panel.

## 2018-11-20 NOTE — PLAN OF CARE
Problem: Patient Care Overview  Goal: Plan of Care Review  Outcome: Ongoing (interventions implemented as appropriate)  Plan of care reviewed with the patient and his wife, both verbalized understanding.  Patient remains on telemetry and is V-paced.  Patient remains on room air.  Patient and his wife educated on ileostomy care.  KINDRA drain has been flushed twice this shift per order.  Wound vac to ABD is on continuous at -125 suction. Patient is on continuous IVF.  Guzamn catheter remains in place for retention.

## 2018-11-20 NOTE — PT/OT/SLP PROGRESS
Physical Therapy  Treatment    Yan Choudhury   MRN: [id number]   Admitting Diagnosis: Open wound of anterior abdominal wall with complication    PT Received On: 11/20/18  PT Start Time: 1115     PT Stop Time: 1140    PT Total Time (min): 25 min       Billable Minutes:  Gait Training 15 min and Therapeutic Activity 10 min    Treatment Type: Treatment  PT/PTA: PT             General Precautions: Standard, fall  Orthopedic Precautions: N/A   Braces: N/A         Subjective:  Communicated with Nurse Miles and Epic chart review prior to session.  Pt found supine in bed and agreeable to tx after some encouragement.     Pain/Comfort  Pain Rating 1: 5/10  Location 1: abdomen  Pain Addressed 1: Nurse notified  Pain Rating Post-Intervention 1: 5/10    Objective:   Patient found with: telemetry, wayne catheter, wound vac, peripheral IV    Functional Mobility:  Bed Mobility: SBA       Transfers: SBA       Gait: ~240ft using RW with CGA     Balance:   Static Sit: FAIR+: Able to take MINIMAL challenges from all directions  Dynamic Sit: FAIR+: Maintains balance through MINIMAL excursions of active trunk motion  Static Stand: FAIR+: Takes MINIMAL challenges from all directions  Dynamic stand: FAIR: Needs CONTACT GUARD during gait     Therapeutic Activities and Exercises:  Pt performed (R) rolling with SBA, supine >sit with SBA and verbal cues for proper technique and safety, scooted to EOB with SBA, sit>stand with SBA, ambulation using RW (per pt request) ~240ft with CGA, t/f to sofa using RW with SBA.     AM-PAC 6 CLICK MOBILITY  How much help from another person does this patient currently need?   1 = Unable, Total/Dependent Assistance  2 = A lot, Maximum/Moderate Assistance  3 = A little, Minimum/Contact Guard/Supervision  4 = None, Modified Christian/Independent    Turning over in bed (including adjusting bedclothes, sheets and blankets)?: 4  Sitting down on and standing up from a chair with arms (e.g., wheelchair, bedside  commode, etc.): 3  Moving from lying on back to sitting on the side of the bed?: 3  Moving to and from a bed to a chair (including a wheelchair)?: 3  Need to walk in hospital room?: 3  Climbing 3-5 steps with a railing?: 1  Basic Mobility Total Score: 17    AM-PAC Raw Score CMS G-Code Modifier Level of Impairment Assistance   6 % Total / Unable   7 - 9 CM 80 - 100% Maximal Assist   10 - 14 CL 60 - 80% Moderate Assist   15 - 19 CK 40 - 60% Moderate Assist   20 - 22 CJ 20 - 40% Minimal Assist   23 CI 1-20% SBA / CGA   24 CH 0% Independent/ Mod I     Patient left sitting on sofa with all lines intact, call button in reach, Nurse Jd notified and wife present.    Assessment:  Yan Choudhury is a 63 y.o. male with a medical diagnosis of Open wound of anterior abdominal wall with complication and presents with impairerd functional mobility, gait instability, and pain.    Rehab identified problem list/impairments: Rehab identified problem list/impairments: weakness, impaired endurance, impaired functional mobilty, decreased coordination, impaired balance, gait instability, pain    Rehab potential is good.    Activity tolerance: Good    Discharge recommendations: Discharge Facility/Level Of Care Needs: home health PT     Barriers to discharge:      Equipment recommendations: Equipment Needed After Discharge: walker, rolling     GOALS:   Multidisciplinary Problems     Physical Therapy Goals        Problem: Physical Therapy Goal    Goal Priority Disciplines Outcome Goal Variances Interventions   Physical Therapy Goal     PT, PT/OT Ongoing (interventions implemented as appropriate)     Description:  PT WILL BE SEEN FOR P.T. FOR A MIN OF 5 OUT OF 7 DAYS A WEEK  LT18  1. PT WILL COMPLETE BED MOBILITY IND  2. PT WILL T/F TO CHAIR WITH NO AD AND S  3. PT WILL GT TRAIN X 100' WITH CGA                      PLAN:    Patient to be seen 5 x/week  to address the above listed problems via gait training, therapeutic  activities, therapeutic exercises  Plan of Care expires: 11/26/18  Plan of Care reviewed with: patient, spouse    PT G-Codes  Functional Assessment Tool Used: neetu duncanpac  Score: 17    Janny Moreno, PT/OT  11/20/2018

## 2018-11-20 NOTE — PLAN OF CARE
Problem: Physical Therapy Goal  Goal: Physical Therapy Goal  PT WILL BE SEEN FOR P.T. FOR A MIN OF 5 OUT OF 7 DAYS A WEEK  LT18  1. PT WILL COMPLETE BED MOBILITY IND  2. PT WILL T/F TO CHAIR WITH NO AD AND S  3. PT WILL GT TRAIN X 100' WITH CGA     Outcome: Ongoing (interventions implemented as appropriate)  Ambulated using RW ~240ft with CGA

## 2018-11-20 NOTE — ASSESSMENT & PLAN NOTE
-Nephrology following   -Creatine currently 2.3 with metabolic acidosis. Renal ultrasound with no acute findings.   -Continue gentle hydration

## 2018-11-21 VITALS
BODY MASS INDEX: 30.3 KG/M2 | DIASTOLIC BLOOD PRESSURE: 55 MMHG | RESPIRATION RATE: 18 BRPM | HEIGHT: 68 IN | SYSTOLIC BLOOD PRESSURE: 94 MMHG | HEART RATE: 69 BPM | WEIGHT: 199.94 LBS | OXYGEN SATURATION: 99 % | TEMPERATURE: 98 F

## 2018-11-21 LAB
ALBUMIN SERPL BCP-MCNC: 2.2 G/DL
ANION GAP SERPL CALC-SCNC: 3 MMOL/L
BACTERIA BLD CULT: NORMAL
BACTERIA BLD CULT: NORMAL
BUN SERPL-MCNC: 25 MG/DL
CALCIUM SERPL-MCNC: 8.4 MG/DL
CHLORIDE SERPL-SCNC: 115 MMOL/L
CO2 SERPL-SCNC: 19 MMOL/L
CREAT SERPL-MCNC: 1.8 MG/DL
EST. GFR  (AFRICAN AMERICAN): 45 ML/MIN/1.73 M^2
EST. GFR  (NON AFRICAN AMERICAN): 39 ML/MIN/1.73 M^2
GLUCOSE SERPL-MCNC: 98 MG/DL
PHOSPHATE SERPL-MCNC: 2.3 MG/DL
POCT GLUCOSE: 105 MG/DL (ref 70–110)
POCT GLUCOSE: 156 MG/DL (ref 70–110)
POCT GLUCOSE: 93 MG/DL (ref 70–110)
POTASSIUM SERPL-SCNC: 4.9 MMOL/L
SODIUM SERPL-SCNC: 137 MMOL/L

## 2018-11-21 PROCEDURE — 25000003 PHARM REV CODE 250: Performed by: COLON & RECTAL SURGERY

## 2018-11-21 PROCEDURE — 25000003 PHARM REV CODE 250: Performed by: SURGERY

## 2018-11-21 PROCEDURE — 80069 RENAL FUNCTION PANEL: CPT

## 2018-11-21 PROCEDURE — 36415 COLL VENOUS BLD VENIPUNCTURE: CPT

## 2018-11-21 PROCEDURE — 25000003 PHARM REV CODE 250: Performed by: NURSE PRACTITIONER

## 2018-11-21 RX ORDER — SIMVASTATIN 10 MG/1
10 TABLET, FILM COATED ORAL NIGHTLY
Qty: 90 TABLET | Refills: 3 | Status: SHIPPED | OUTPATIENT
Start: 2018-11-21 | End: 2018-11-27 | Stop reason: SDUPTHER

## 2018-11-21 RX ORDER — METRONIDAZOLE 500 MG/1
500 TABLET ORAL EVERY 8 HOURS
Qty: 42 TABLET | Refills: 0 | Status: SHIPPED | OUTPATIENT
Start: 2018-11-21 | End: 2018-12-11

## 2018-11-21 RX ORDER — CIPROFLOXACIN 500 MG/1
500 TABLET ORAL DAILY
Qty: 28 TABLET | Refills: 0 | Status: SHIPPED | OUTPATIENT
Start: 2018-11-21 | End: 2018-12-05

## 2018-11-21 RX ORDER — DIPHENOXYLATE HYDROCHLORIDE AND ATROPINE SULFATE 2.5; .025 MG/1; MG/1
2 TABLET ORAL 4 TIMES DAILY
Qty: 240 TABLET | Refills: 2 | Status: SHIPPED | OUTPATIENT
Start: 2018-11-21 | End: 2018-11-29 | Stop reason: SDUPTHER

## 2018-11-21 RX ORDER — LOPERAMIDE HYDROCHLORIDE 2 MG/1
2 CAPSULE ORAL
Qty: 120 CAPSULE | Refills: 0 | Status: SHIPPED | OUTPATIENT
Start: 2018-11-21 | End: 2018-12-01

## 2018-11-21 RX ADMIN — CARVEDILOL 12.5 MG: 12.5 TABLET, FILM COATED ORAL at 08:11

## 2018-11-21 RX ADMIN — CIPROFLOXACIN HYDROCHLORIDE 500 MG: 500 TABLET, FILM COATED ORAL at 08:11

## 2018-11-21 RX ADMIN — METRONIDAZOLE 500 MG: 500 TABLET, FILM COATED ORAL at 06:11

## 2018-11-21 RX ADMIN — LOPERAMIDE HYDROCHLORIDE 2 MG: 2 CAPSULE ORAL at 08:11

## 2018-11-21 RX ADMIN — PANTOPRAZOLE SODIUM 40 MG: 40 TABLET, DELAYED RELEASE ORAL at 08:11

## 2018-11-21 RX ADMIN — PSYLLIUM HUSK 6 G: 6 GRANULE ORAL at 08:11

## 2018-11-21 RX ADMIN — SODIUM CHLORIDE: 0.9 INJECTION, SOLUTION INTRAVENOUS at 04:11

## 2018-11-21 RX ADMIN — ALLOPURINOL 100 MG: 100 TABLET ORAL at 08:11

## 2018-11-21 NOTE — SUBJECTIVE & OBJECTIVE
Interval History: renal function improved, would like to go home, explained about flushing the drain in the hospital/snf, but still wants to go home    Feel family member can flush the drani.    Discussed monitoring the ileostomy output    Medications:  Continuous Infusions:   sodium chloride 0.9% 100 mL/hr at 11/21/18 0440     Scheduled Meds:   allopurinol  100 mg Oral Daily    carvedilol  12.5 mg Oral BID    ciprofloxacin HCl  500 mg Oral Daily    enoxaparin  30 mg Subcutaneous Daily    lidocaine (PF) 10 mg/ml (1%)  1 mL Other Once    loperamide  2 mg Oral QID (PC + HS)    metroNIDAZOLE  500 mg Oral Q8H    pantoprazole  40 mg Oral Daily    psyllium husk  1 packet Oral Daily    simvastatin  10 mg Oral QHS     PRN Meds:sodium chloride, acetaminophen, dextrose 50%, dextrose 50%, glucagon (human recombinant), glucose, glucose, hydrALAZINE, insulin aspart U-100, ondansetron     Review of patient's allergies indicates:  No Known Allergies  Objective:     Vital Signs (Most Recent):  Temp: 96.8 °F (36 °C) (11/21/18 0655)  Pulse: 84 (11/21/18 0655)  Resp: 18 (11/21/18 0655)  BP: 110/61 (11/21/18 0655)  SpO2: 99 % (11/21/18 0655) Vital Signs (24h Range):  Temp:  [96.8 °F (36 °C)-99 °F (37.2 °C)] 96.8 °F (36 °C)  Pulse:  [71-97] 84  Resp:  [16-20] 18  SpO2:  [96 %-99 %] 99 %  BP: ()/(53-68) 110/61     Weight: 90.7 kg (199 lb 15.3 oz)  Body mass index is 30.4 kg/m².    Intake/Output - Last 3 Shifts       11/19 0700 - 11/20 0659 11/20 0700 - 11/21 0659 11/21 0700 - 11/22 0659    P.O. 480 480     I.V. (mL/kg) 1098.3 (11.9) 1095 (12.1)     Blood       Other 200      Total Intake(mL/kg) 1778.3 (19.2) 1575 (17.4)     Urine (mL/kg/hr) 1600 (0.7) 1450 (0.7)     Drains 105 100     Other       Stool 740 1025     Total Output 2445 2575     Net -666.7 -1000            Stool Occurrence  1 x           Physical Exam   Constitutional: No distress.   Chronically ill   HENT:   Head: Normocephalic.   Neck: Normal range of  motion. Neck supple.   Cardiovascular: Normal rate, regular rhythm and normal heart sounds.   Pulmonary/Chest: Effort normal and breath sounds normal.   Abdominal: Soft. Bowel sounds are normal.   ruq ileostomy with firm/liquid out put  llq wound vac with clean wound  Supra pubic drain with purulent/feculent output the clear with flushing   Musculoskeletal: He exhibits no edema.   Skin: Skin is warm. Capillary refill takes less than 2 seconds.   Psychiatric: He has a normal mood and affect. His behavior is normal. Judgment and thought content normal.   Nursing note and vitals reviewed.      Significant Labs:  CBC:   Recent Labs   Lab 11/19/18 0437   WBC 10.22   RBC 3.40*   HGB 9.3*   HCT 28.2*      MCV 83   MCH 27.4   MCHC 33.0     BMP:   Recent Labs   Lab 11/19/18 0437 11/21/18 0434   *   < > 98   *   < > 137   K 4.5   < > 4.9      < > 115*   CO2 18*   < > 19*   BUN 54*   < > 25*   CREATININE 3.4*   < > 1.8*   CALCIUM 8.5*   < > 8.4*   MG 1.7  --   --     < > = values in this interval not displayed.     CMP:   Recent Labs   Lab 11/16/18 0437 11/21/18 0434   *  144*   < > 98   CALCIUM 9.1  9.1   < > 8.4*   ALBUMIN 2.6*  2.6*   < > 2.2*   PROT 7.2  --   --    *  132*   < > 137   K 4.1  4.1   < > 4.9   CO2 18*  18*   < > 19*     100   < > 115*   BUN 72*  72*   < > 25*   CREATININE 8.0*  8.0*   < > 1.8*   ALKPHOS 83  --   --    ALT 51*  --   --    AST 50*  --   --    BILITOT 0.9  --   --     < > = values in this interval not displayed.       Significant Diagnostics:  none

## 2018-11-21 NOTE — ASSESSMENT & PLAN NOTE
No acute surgical intervention at this time  Continue KINDRA drain to bulb suction with flush 2 to three times a day  Will attempt to allow leak to naturally heal with fecal diversion with ileostomy

## 2018-11-21 NOTE — CONSULTS
Pt has [facility]. Referral has been sent via [facility] and [facility] has accepted.

## 2018-11-21 NOTE — PLAN OF CARE
11/21/18 1446   Medicare Message   Important Message from Medicare regarding Discharge Appeal Rights Given to patient/caregiver;Explained to patient/caregiver;Signed/date by patient/caregiver   Date IMM was signed 11/21/18   Time IMM was signed [id number]

## 2018-11-21 NOTE — PROGRESS NOTES
[facility] -   General Surgery  Progress Note    Subjective:     History of Present Illness:  Patient is a 63 y.o. male presents after a colostomy reversal, repair of parastomal hernia and protective ileostomy.     The patient states he is doing okay but he is a bit tired and fatigued he states that purulent material started coming out of his drain about 2 days ago.  He denies any fever or chills.  He states that his ileostomy output is more formed.  The patient was admitted from clinic once we noticed that his BUN and creatinine were significantly elevated        Post-Op Info:  * No surgery found *         Interval History: renal function improved, would like to go home, explained about flushing the drain in the hospital/snf, but still wants to go home    Feel family member can flush the drani.    Discussed monitoring the ileostomy output    Medications:  Continuous Infusions:   sodium chloride 0.9% 100 mL/hr at 11/21/18 0440     Scheduled Meds:   allopurinol  100 mg Oral Daily    carvedilol  12.5 mg Oral BID    ciprofloxacin HCl  500 mg Oral Daily    enoxaparin  30 mg Subcutaneous Daily    lidocaine (PF) 10 mg/ml (1%)  1 mL Other Once    loperamide  2 mg Oral QID (PC + HS)    metroNIDAZOLE  500 mg Oral Q8H    pantoprazole  40 mg Oral Daily    psyllium husk  1 packet Oral Daily    simvastatin  10 mg Oral QHS     PRN Meds:sodium chloride, acetaminophen, dextrose 50%, dextrose 50%, glucagon (human recombinant), glucose, glucose, hydrALAZINE, insulin aspart U-100, ondansetron     Review of patient's allergies indicates:  No Known Allergies  Objective:     Vital Signs (Most Recent):  Temp: 96.8 °F (36 °C) (11/21/18 0655)  Pulse: 84 (11/21/18 0655)  Resp: 18 (11/21/18 0655)  BP: 110/61 (11/21/18 0655)  SpO2: 99 % (11/21/18 0655) Vital Signs (24h Range):  Temp:  [96.8 °F (36 °C)-99 °F (37.2 °C)] 96.8 °F (36 °C)  Pulse:  [71-97] 84  Resp:  [16-20] 18  SpO2:  [96 %-99 %] 99 %  BP: ()/(53-68)  110/61     Weight: 90.7 kg (199 lb 15.3 oz)  Body mass index is 30.4 kg/m².    Intake/Output - Last 3 Shifts       11/19 0700 - 11/20 0659 11/20 0700 - 11/21 0659 11/21 0700 - 11/22 0659    P.O. 480 480     I.V. (mL/kg) 1098.3 (11.9) 1095 (12.1)     Blood       Other 200      Total Intake(mL/kg) 1778.3 (19.2) 1575 (17.4)     Urine (mL/kg/hr) 1600 (0.7) 1450 (0.7)     Drains 105 100     Other       Stool 740 1025     Total Output 2445 2575     Net -666.7 -1000            Stool Occurrence  1 x           Physical Exam   Constitutional: No distress.   Chronically ill   HENT:   Head: Normocephalic.   Neck: Normal range of motion. Neck supple.   Cardiovascular: Normal rate, regular rhythm and normal heart sounds.   Pulmonary/Chest: Effort normal and breath sounds normal.   Abdominal: Soft. Bowel sounds are normal.   ruq ileostomy with firm/liquid out put  llq wound vac with clean wound  Supra pubic drain with purulent/feculent output the clear with flushing   Musculoskeletal: He exhibits no edema.   Skin: Skin is warm. Capillary refill takes less than 2 seconds.   Psychiatric: He has a normal mood and affect. His behavior is normal. Judgment and thought content normal.   Nursing note and vitals reviewed.      Significant Labs:  CBC:   Recent Labs   Lab 11/19/18 0437   WBC 10.22   RBC 3.40*   HGB 9.3*   HCT 28.2*      MCV 83   MCH 27.4   MCHC 33.0     BMP:   Recent Labs   Lab 11/19/18 0437 11/21/18 0434   *   < > 98   *   < > 137   K 4.5   < > 4.9      < > 115*   CO2 18*   < > 19*   BUN 54*   < > 25*   CREATININE 3.4*   < > 1.8*   CALCIUM 8.5*   < > 8.4*   MG 1.7  --   --     < > = values in this interval not displayed.     CMP:   Recent Labs   Lab 11/16/18 0437 11/21/18 0434   *  144*   < > 98   CALCIUM 9.1  9.1   < > 8.4*   ALBUMIN 2.6*  2.6*   < > 2.2*   PROT 7.2  --   --    *  132*   < > 137   K 4.1  4.1   < > 4.9   CO2 18*  18*   < > 19*     100   < > 115*    BUN 72*  72*   < > 25*   CREATININE 8.0*  8.0*   < > 1.8*   ALKPHOS 83  --   --    ALT 51*  --   --    AST 50*  --   --    BILITOT 0.9  --   --     < > = values in this interval not displayed.       Significant Diagnostics:  none    Assessment/Plan:     * Open wound of anterior abdominal wall with complication    Wound VAC, wound VAC changes Monday Wednesday and Friday.  Wound care consult      Gastrointestinal anastomotic leak    No acute surgical intervention at this time  Continue KINDRA drain to bulb suction with flush 2 to three times a day  Will attempt to allow leak to naturally heal with fecal diversion with ileostomy     Acute on chronic renal failure    Management per Nephrology consult  BUN and creatinine are improving.    Continue IV fluids per Nephrology     Ileostomy in place    He is on Metamucil and 2 mg loperamide Q 6 hr.  The ileostomy output needs to be monitored to ensure it is becoming semi soft      Ileostomy output is decreasing. The patient and his wife would need to have a better understanding of collecting and monitoring the ileostomy output     Chronic combined systolic and diastolic congestive heart failure    Cardiology and Hospital Medicine consulted  Transfuse 1unit pRBC today per cards recs     Gastroesophageal reflux disease    Continue PPI      Ischemic cardiomyopathy    Cardiology consult for further management       Hypertension associated with diabetes    Hospital Medicine consult for management          Kevin Lindsay MD  General Surgery  [facility] -

## 2018-11-21 NOTE — DISCHARGE INSTRUCTIONS
It is very important that you monitor how much comes out of your ileostomy.  It should be no more than 1 L to 1.2 L a day.  This is 5365-9999 mL on the collection device.    If it is more than this amount, 0873-0199 mL for 2 days in a row please let us know.    If you start feeling ill or fatigue please let us know.    If you have fever or chills please let us know.    You need to flush the drain coming out of your abdominal wall 2 to 3 times a day.  This should slowly become a clear fluid.    If you have problems flushing the drain or the drain comes out please come to the emergency room right away.    You need to drink at least half the amount that comes out of the ileostomy every day, this would be in addition to what you usually would drink.  Do not drink the fluid that comes out of the ileostomy, you need to drink other beverages    If for any reason you start to feel worse please let us know or go to the emergency room.    If you urine starts looking dark like [employer] please let us know

## 2018-11-21 NOTE — ASSESSMENT & PLAN NOTE
He is on Metamucil and 2 mg loperamide Q 6 hr.  The ileostomy output needs to be monitored to ensure it is becoming semi soft      Ileostomy output is decreasing. The patient and his wife would need to have a better understanding of collecting and monitoring the ileostomy output

## 2018-11-21 NOTE — PT/OT/SLP PROGRESS
Physical Therapy      Patient Name:  Yan Choudhury   MRN:  [id number]    PT MET IN RM X 2 TRIALS. PT REFUSED TX X 2 @08:50 AND 11:36. P.T. TO CONT TX NEXT VISIT    Hanny Murray, PT,11/21/2018

## 2018-11-21 NOTE — PROGRESS NOTES
Follow up visit with Mr. Choudhury for continued Ileostomy care and wound vac dressing change. Ileostomy pouch noted to be intact with no leak. Emptied at this time 115mL thin dark green/brown liquid. Discussed with patient and wife at bedside. They are emptying and keeping log of output as we discussed. Per Dr. Lindsay, He needs to have less than 1200mL output from ileostomy in a 24 hour period. He is to drink excess fluid over regular amount to account for half of output. (ie. If he empties 500mL, he is to drink extra 250mL water at that time.)  If he has >1200mL output for 2 days in a row, he is come back to hospital.  Patient and wife state agreement and understanding of these instructions.      KINDRA drain again noted with brown purulent drainage.  Cyndie-tube ulceration again noted with creamy drainage. Cleansed all with saline and patted dry.Dr. Lindsay at bedside and re-sutured tube for added security. He then flushed tube with 40mL saline and pulled back to clear.  He discussed with patient and wife need to have KINDRA drain flushed that same way 2-3 times per day. Patient states his cousin can help do that.  Dressed with fresh drain sponge.     LUQ old colostomy site wound vac dressing then changed. Wound vac paused.  Dressing removed. Wound measures 43.5x5.5x3cm with moist beefy red granulating wound bed and small amount serosanguinous drainage. Cleansed with saline.  Small black foam cut to size and applied to fill wound bed.  Covered with drape, then sensatrac pad and tubing applied and attached to KCI wound vac ULTA at continuous -125 mmHg low intensity suction with good seal noted. He has his own home KCI wound vac at bedside and will switch over to that at time of discharge. Patient tolerated wound care well. All of Dr. Lindsay's education with patient reinforced at this time.  Will continue to follow.     He will need HH resumed at discharge to assist with Ileostomy management and wound vac management, as  well as flushing of KINDRA drain. He will need to be provided with extra NS flushes at discharge to assist with KINDRA drain care.

## 2018-11-23 ENCOUNTER — TELEPHONE (OUTPATIENT)
Dept: ADMINISTRATIVE | Facility: CLINIC | Age: 63
End: 2018-11-23

## 2018-11-26 ENCOUNTER — TELEPHONE (OUTPATIENT)
Dept: SURGERY | Facility: CLINIC | Age: 63
End: 2018-11-26

## 2018-11-26 NOTE — TELEPHONE ENCOUNTER
Spoke to patient's wife, she is aware of the patient's appointment date and time, she voiced understanding.

## 2018-11-26 NOTE — TELEPHONE ENCOUNTER
----- Message from Chrissy Calvillo sent at 11/26/2018 10:20 AM CST -----  Contact: pt wife  Calling in regards to an upcoming  appointment and please advise [phone]

## 2018-11-27 ENCOUNTER — OFFICE VISIT (OUTPATIENT)
Dept: NEPHROLOGY | Facility: CLINIC | Age: 63
End: 2018-11-27
Payer: MEDICARE

## 2018-11-27 VITALS
DIASTOLIC BLOOD PRESSURE: 48 MMHG | SYSTOLIC BLOOD PRESSURE: 96 MMHG | HEIGHT: 68 IN | WEIGHT: 190.94 LBS | HEART RATE: 54 BPM | BODY MASS INDEX: 28.94 KG/M2

## 2018-11-27 DIAGNOSIS — N18.30 CKD (CHRONIC KIDNEY DISEASE) STAGE 3, GFR 30-59 ML/MIN: Primary | ICD-10-CM

## 2018-11-27 DIAGNOSIS — N17.9 AKI (ACUTE KIDNEY INJURY): ICD-10-CM

## 2018-11-27 PROCEDURE — 99213 OFFICE O/P EST LOW 20 MIN: CPT | Mod: PBBFAC,PO | Performed by: INTERNAL MEDICINE

## 2018-11-27 PROCEDURE — 99999 PR PBB SHADOW E&M-EST. PATIENT-LVL III: CPT | Mod: PBBFAC,,, | Performed by: INTERNAL MEDICINE

## 2018-11-27 PROCEDURE — 99214 OFFICE O/P EST MOD 30 MIN: CPT | Mod: S$PBB,,, | Performed by: INTERNAL MEDICINE

## 2018-11-27 RX ORDER — SODIUM, POTASSIUM,MAG SULFATES 17.5-3.13G
SOLUTION, RECONSTITUTED, ORAL ORAL
COMMUNITY
Start: 2018-10-27 | End: 2018-12-11

## 2018-11-27 NOTE — PROGRESS NOTES
PROGRESS NOTE FOR ESTABLISHED PATIENT    PHYSICIAN REQUESTING THE CONSULT: Dr. Sadnra Estevez    REASON FOR VISIT: Renal insufficiency    63 y.o. male with history of CKD 3/4, HTN, CHF, anemia, DM2, CAD, pulmonary hypertension presents to the renal clinic for evaluation of renal insufficiency.       Patient had recent hospital stay at [facility] [place]/2018 when he presented with leak from colostomy reversal and BROOKE. Peak creatinine was 8.2 on 11/15/18. Renal function has recovered with IV fluids and creatinine has now declined to 1.8 (11/21/18).    Patient today has  no complaints.         Past Medical History:   Diagnosis Date    Arthritis     CAD (coronary artery disease)     Cataract     CHF (congestive heart failure)     Dr Calvillo    Chronic combined systolic and diastolic congestive heart failure 3/24/2015    CKD (chronic kidney disease), stage III     Diabetes mellitus type II      AM    Diabetic retinopathy     anti Veg F injections for macular edema    Diverticulitis of colon     ED (erectile dysfunction)     Glaucoma     HTN (hypertension)     Hyperlipidemia     Ischemic cardiomyopathy     Obesity     JAHAIRA on CPAP     Pacemaker     Pulmonary HTN        Past Surgical History:   Procedure Laterality Date    CARDIAC CATHETERIZATION  2009    CHOLECYSTECTOMY      COLECTOMY-SIGMOID N/A 4/22/2016    Performed by Kevin Lindsay MD at [facility] OR    COLONOSCOPY N/A 10/11/2018    Procedure: COLONOSCOPY;  Surgeon: Quinn Aragon MD;  Location: [facility] ENDO;  Service: General;  Laterality: N/A;    COLONOSCOPY N/A 10/11/2018    Performed by Quinn Aragon MD at [facility] ENDO    COLOSTOMY N/A 4/22/2016    Performed by [facility] Lindsay [facility] at [facility] OR    CREATION, ILEOSTOMY N/A 10/30/2018    Performed by [facility] at [facility] OR    EYE SURGERY      HEMICOLECTOMY, RIGHT Right 10/30/2018    Performed by [facility] at [facility] OR    ILEOSTOMY N/A 10/30/2018    Procedure: CREATION,  ILEOSTOMY;  Surgeon: Kevin Lindsay MD;  Location: [facility] OR;  Service: General;  Laterality: N/A;    KNEE SURGERY      MOBILIZATION OF SPLENIC FLEXURE N/A 10/30/2018    Procedure: MOBILIZATION, SPLENIC FLEXURE;  Surgeon: Kevin Lindsay MD;  Location: [facility] OR;  Service: General;  Laterality: N/A;    MOBILIZATION, SPLENIC FLEXURE N/A 10/30/2018    Performed by Kevin Lindsay MD at [facility] OR    REVISION COLOSTOMY N/A 10/30/2018    Procedure: REVISION OR CLOSURE, COLOSTOMY;  Surgeon: Kevin Lindsay MD;  Location: [facility] OR;  Service: General;  Laterality: N/A;  Parastomal Hernia Repair    REVISION OR CLOSURE, COLOSTOMY N/A 10/30/2018    Performed by Kevin Lindsay MD at [facility] OR    RIGHT HEMICOLECTOMY Right 10/30/2018    Procedure: HEMICOLECTOMY, RIGHT;  Surgeon: Kevin Lindsay MD;  Location: [facility] OR;  Service: General;  Laterality: Right;       Review of patient's allergies indicates:  No Known Allergies    Current Outpatient Medications   Medication Sig Dispense Refill    allopurinol (ZYLOPRIM) 100 MG tablet TAKE 1 TABLET (100 MG TOTAL) BY MOUTH ONCE DAILY. (Patient taking differently: TAKE 1 TABLET (100 MG TOTAL) BY MOUTH ONCE DAILY./ takes in AM) 90 tablet 0    aspirin (ECOTRIN) 81 MG EC tablet Take 81 mg by mouth every morning.       BD INSULIN SYRINGE ULTRA-FINE 0.5 mL 31 gauge x 5/16 Syrg USE AS DIRECTED TO INJECT INSULIN TWO TIMES DAILY 60 each 11    bimatoprost (LUMIGAN) 0.03 % ophthalmic drops Place 1 drop into the left eye every evening. (Patient taking differently: Place 1 drop into the right eye every evening. ) 2.5 mL 11    blood sugar diagnostic Strp 1 strip by Misc.(Non-Drug; Combo Route) route 4 (four) times daily. Telcare 120 strip 11    bumetanide (BUMEX) 2 MG tablet Take 1 tablet (2 mg total) by mouth 2 (two) times daily. (Patient taking differently: Take 2 mg by mouth every morning. ) 75 tablet 11    carvedilol (COREG) 25 MG tablet TAKE 1/2 TABLET BY MOUTH TWICE A DAY WITH  "MEALS 30 tablet 9    cholecalciferol, vitamin D3, (VITAMIN D3) 1,000 unit capsule Take 1,000 Units by mouth once daily.      ciprofloxacin HCl (CIPRO) 500 MG tablet Take 1 tablet (500 mg total) by mouth once daily. for 14 days 28 tablet 0    diphenoxylate-atropine 2.5-0.025 mg (LOMOTIL) 2.5-0.025 mg per tablet Take 2 tablets by mouth 4 (four) times daily. for 10 days 240 tablet 2    HYDROcodone-acetaminophen (NORCO) 5-325 mg per tablet One or 2 every 4-6 hours as needed for pain 20 tablet 0    insulin glargine (LANTUS) 100 unit/mL injection 50 units bid prn when BS< 150 30 mL 11    IRON/VITAMIN B COMPLEX (GERITOL ORAL) Take by mouth.      lancets [facility] 120 each 11    lisinopril (PRINIVIL,ZESTRIL) 5 MG tablet TAKE 1 TABLET (5 MG TOTAL) BY MOUTH ONCE DAILY. (Patient taking differently: Take 5 mg by mouth every morning. ) 90 tablet 3    loperamide (IMODIUM) 2 mg capsule Take 1 capsule (2 mg total) by mouth 2 hours after meals and at bedtime. for 10 days 120 capsule 0    metroNIDAZOLE (FLAGYL) 500 MG tablet Take 1 tablet (500 mg total) by mouth every 8 (eight) hours. 42 tablet 0    pantoprazole (PROTONIX) 40 MG tablet TAKE 1 TABLET BY MOUTH EVERY DAY FOR ACID REFLUX 90 tablet 1    pen needle, diabetic (BD ULTRA-FINE JOSLYN PEN NEEDLE) 32 gauge x 5/32" Ndle 1 each by Misc.(Non-Drug; Combo Route) route 4 (four) times daily. 100 each 11    potassium chloride SA (K-DUR,KLOR-CON) 10 MEQ tablet TAKE 1 TABLET BY MOUTH EVERY DAY (Patient not taking: Reported on 11/15/2018) 90 tablet 2    psyllium husk, aspartame, (METAMUCIL) 3.4 gram PwPk packet Take 1 packet by mouth once daily.      simvastatin (ZOCOR) 10 MG tablet TAKE 1 TABLET (10 MG TOTAL) BY MOUTH EVERY EVENING. 30 tablet 7    simvastatin (ZOCOR) 10 MG tablet Take 1 tablet (10 mg total) by mouth every evening. 90 tablet 3     No current facility-administered medications for this visit.        Family History   Problem Relation Age of Onset    " Cancer Mother     Heart disease Father     Stroke Father     No Known Problems Sister     No Known Problems Brother     No Known Problems Maternal Aunt     No Known Problems Maternal Uncle     No Known Problems Paternal Aunt     No Known Problems Paternal Uncle     No Known Problems Maternal Grandmother     No Known Problems Maternal Grandfather     No Known Problems Paternal Grandmother     No Known Problems Paternal Grandfather     Amblyopia Neg Hx     Blindness Neg Hx     Cataracts Neg Hx     Diabetes Neg Hx     Glaucoma Neg Hx     Hypertension Neg Hx     Macular degeneration Neg Hx     Retinal detachment Neg Hx     Strabismus Neg Hx     Thyroid disease Neg Hx        Social History     Socioeconomic History    Marital status:      Spouse name: Not on file    Number of children: 2    Years of education: Not on file    Highest education level: Not on file   Social Needs    Financial resource strain: Not on file    Food insecurity - worry: Not on file    Food insecurity - inability: Not on file    Transportation needs - medical: Not on file    Transportation needs - non-medical: Not on file   Occupational History    Occupation: [employer]     Employer: [employer]   Tobacco Use    Smoking status: Never Smoker    Smokeless tobacco: Never Used   Substance and Sexual Activity    Alcohol use: Yes     Alcohol/week: 0.0 oz     Comment: NO ALCOHOL 72 HOURS PRIOR TO SX    Drug use: No    Sexual activity: Not Currently   Other Topics Concern    Not on file   Social History Narrative    . Lives with spouse. Has 2 children. 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       Review of Systems:  1. GENERAL: patient denies any fever, weight changes, generalized weakness, dizziness.  2. HEENT: patient denies headaches, visual disturbances, swallowing problems, sinus pain, nasal congestion.  3. CARDIOVASCULAR: patient denies chest pain,  "palpitations.  4. PULMONARY: patient reports SOB with exercise, no coughing, hemoptysis, wheezing.  5. GASTROINTESTINAL: patient denies abdominal pain, nausea, vomiting, diarrhea.  6. GENITOURINARY: patient denies dysuria, hematuria, hesitancy, frequency.  7. EXTREMITIES: patient denies LE edema, no LE cramping.  8. DERMATOLOGY: patient denies rashes, ulcers.  9. NEURO: patient denies tremors, extremity weakness, extremity numbness/tingling.  10. MUSCULOSKELETAL: patient denies joint pain, joint swelling.  11. HEMATOLOGY: patient denies rectal or gum bleeding.  12: PSYCH: patient denies anxiety, depression.      PHYSICAL EXAM:  BP (!) 96/48   Pulse (!) 54   Ht 5' 8" (1.727 m)   Wt 86.6 kg (190 lb 14.7 oz)   BMI 29.03 kg/m²     GENERAL: Pleasant gentleman presents to clinic with non-labored breathing.  HEENT: PER, no nasal discharge, no icterus, no oral exudates, moist mucosal membranes.  NECK: no thyroid mass, no lymphadenopathy.  HEART: RRR S1/S2, no rubs, good peripheral pulses.  LUNGS: CTA bilaterally, no wheezing, breathing is nonlabored.  ABDOMEN: soft, nontender, not distended, bowel sounds are present, no abdominal hernia, abdominal drain in place  EXTREM: No LE edema.  SKIN: no rashes, skin is warm and dry.  NEURO: A & O x 3, no obvious focal signs.    LABORATORY RESULTS:    Lab Results   Component Value Date    CREATININE 1.8 (H) 11/21/2018    BUN 25 (H) 11/21/2018     11/21/2018    K 4.9 11/21/2018     (H) 11/21/2018    CO2 19 (L) 11/21/2018      Lab Results   Component Value Date    .0 (H) 08/10/2018    CALCIUM 8.4 (L) 11/21/2018    PHOS 2.3 (L) 11/21/2018     Lab Results   Component Value Date    ALBUMIN 2.2 (L) 11/21/2018     Lab Results   Component Value Date    WBC 10.22 11/19/2018    HGB 9.3 (L) 11/19/2018    HCT 28.2 (L) 11/19/2018    MCV 83 11/19/2018     11/19/2018     Protein Creatinine Ratios:    Creatinine, Random Ur   Date Value Ref Range Status   11/15/2018 " >450.0 (H) 23.0 - 375.0 mg/dL Final     Comment:     The random urine reference ranges provided were established   for 24 hour urine collections.  No reference ranges exist for  random urine specimens.  Correlate clinically.     11/15/2018 >450.0 (H) 23.0 - 375.0 mg/dL Final     Comment:     The random urine reference ranges provided were established   for 24 hour urine collections.  No reference ranges exist for  random urine specimens.  Correlate clinically.     08/10/2018 104.0 23.0 - 375.0 mg/dL Final     Comment:     The random urine reference ranges provided were established   for 24 hour urine collections.  No reference ranges exist for  random urine specimens.  Correlate clinically.       Protein, Urine Random   Date Value Ref Range Status   11/15/2018 38 (H) 0 - 15 mg/dL Final     Comment:     The random urine reference ranges provided were established   for 24 hour urine collections.  No reference ranges exist for  random urine specimens.  Correlate clinically.     08/10/2018 37 (H) 0 - 15 mg/dL Final     Comment:     The random urine reference ranges provided were established   for 24 hour urine collections.  No reference ranges exist for  random urine specimens.  Correlate clinically.     05/18/2018 20 (H) 0 - 15 mg/dL Final     Comment:     The random urine reference ranges provided were established   for 24 hour urine collections.  No reference ranges exist for  random urine specimens.  Correlate clinically.       Prot/Creat Ratio, Ur   Date Value Ref Range Status   11/15/2018 Unable to calculate 0.00 - 0.20 Final   08/10/2018 0.36 (H) 0.00 - 0.20 Final   05/18/2018 0.16 0.00 - 0.20 Final             ASSESSMENT AND PLAN:  63 y.o. male with history of CKD 3/4, HTN, CHF, anemia, DM2, CAD, pulmonary hypertension presents to the renal clinic for evaluation of renal insufficiency.     1. Renal insufficiency: Patient had recent episode of BROOKE with peak creatinine of 8.2 on 11/15/18. Renal function has  improved with IV fluids and creatinine has declined to 1.8. Patient likely suffers from mild cardiorenal syndrome (EF 20-25%) and creatinine has been fluctuating (in addition to diabetic nephropathy). Will continue Lisinopril for proteinuria.  Patient's renal function will be monitored closely and he will return to the clinic in 4 months for follow up. Patient is on fluid restrictions (about 40-50 ounces per day).     2. Electrolytes: mild hypophosphatemia, monitor.     3. Acid base status: mild acidosis, monitor.     4. Volume: Mild LE edema. Continue Bumex.     5. Hypertension: BP borderline low (consequences of poor cardiac function). He denies any hypotension-related symptoms.      6. Medications: Reviewed. Agree with current medical regimen. Continue Lisinopril.    7. DM2: well controlled with last HgA1c at 6.5 (5/2/18).     8. CAD/CHF: As per Cardiology (Dr. Calvillo).     9. Hyperparathyroidism: vitamin D OTC.     10. S/p colostomy reversal: he has follow up with Surgery.

## 2018-11-27 NOTE — PHYSICIAN QUERY
PT Name: Yan Choudhury  MR #: [id number]     Physician Query Form - Documentation Clarification      CDS/: Keli Leary               Contact information: [email or web address]    This form is a permanent document in the medical record.     Query Date: November 27, 2018    By submitting this query, we are merely seeking further clarification of documentation. Please utilize your independent clinical judgment when addressing the question(s) below.    The Medical record reflects the following:    Supporting Clinical Findings Location in Medical Record       There is a healthy ileostomy in the right upper quadrant with 2 openings.    The staples were removed from the incision.  The supporting bar was removed from the ileostomy.    General Surgery H&P 11/16    RUQ Ileostomy was placed 3 weeks ago.   Cyndie stomal breakdown noted at 3 o'clock and 9 o'clock in location where loop ileostomy saurabh had been removed yesterday in office. Stage 3 pressure injuries noted to each side each measuring 0.3x1.5x0.1cm with moist yellow slough noted to pink wound beds and scant serous drainage.      Wound Care Consult 11/16                                                                            Doctor, Please specify diagnosis or diagnoses associated with above clinical findings.    Provider Use Only      [  ] Stage 3 Pressure Ulcers due to Ileostomy Support Saurabh    [  ] Stage 3 Pressure Ulcers due to other cause, please specify:  ______________    [ x ] Other diagnosis, please specify:  _ not unexpected with the support bar in place. Typically resolve                                                                                                                  Clinically Undetermined

## 2018-11-27 NOTE — PHYSICIAN QUERY
PT Name: Yan Choudhury  MR #: [id number]     Physician Query Form - Documentation Clarification      CDS/: Keli Leary               Contact information: [email or web address]    This form is a permanent document in the medical record.     Query Date: November 27, 2018    By submitting this query, we are merely seeking further clarification of documentation. Please utilize your independent clinical judgment when addressing the question(s) below.    The Medical record reflects the following:    Supporting Clinical Findings Location in Medical Record   Presents after a colostomy reversal, repair of parastomal hernia and protective ileostomy  Purulent material started coming out of his drain about 2 days ago    Diagnostic Results:  CT scan of the abdomen and pelvis with rectal contrast to evaluate for abscess last anastomotic leak    Assessment/Plan:  Suspect pelvic abscess that may be incompletely drained   General Surgery H&P 11/16   Clinical History:  Post operative infection, abdomen pelvis; Peritoneal abscess    Findings:  Rectal contrast was administered which demonstrates sigmoid perforation with a collection of contrast and air within the anterior pelvis.  Kenny-Kohli drain tip projects within the collection.  Collection measures approximately 3 x 2.4 x 5.1 Cm   CT Abdomen and Pelvis 11/16   A CT scan of the abdomen and pelvis with oral and rectal contrast was performed.  It confirmed the contained leak from his colostomy reversal.  The drain is in position of the leak.    His CT scan was reviewed with Radiology in the tip of the drain is in the fluid collection.     Gastrointestinal anastomotic leak  Open wound of anterior abdominal wall with complication Discharge Summary   WBC= 13.50 - 14.12 Labs, CBC 11/15 & 11/16   Ciprofloxacin and Flagyl IVPB and PO   MAR                                                                            Doctor, Please specify diagnosis or diagnoses associated with  above clinical findings.    Provider Use Only      [x  ] Anastomotic Leak with Pelvic Abscess    [  ] Anastomotic Leak with Peritoneal Abscess    [  ] Anastomotic Leak without Abscess    [  ] Other diagnosis, please specify:  ___________________                                                                                                                Clinically Undetermined

## 2018-11-28 NOTE — PLAN OF CARE
11/28/18 1614   Final Note   Assessment Type Final Discharge Note   Anticipated Discharge Disposition Home-Health   Right Care Referral Info   Post Acute Recommendation Home-care   Facility Name [facility], State [place], [place]

## 2018-11-29 ENCOUNTER — LAB VISIT (OUTPATIENT)
Dept: LAB | Facility: HOSPITAL | Age: 63
End: 2018-11-29
Attending: SURGERY
Payer: MEDICARE

## 2018-11-29 ENCOUNTER — OFFICE VISIT (OUTPATIENT)
Dept: SURGERY | Facility: CLINIC | Age: 63
End: 2018-11-29
Payer: MEDICARE

## 2018-11-29 VITALS
SYSTOLIC BLOOD PRESSURE: 76 MMHG | WEIGHT: 190 LBS | TEMPERATURE: 98 F | BODY MASS INDEX: 28.89 KG/M2 | DIASTOLIC BLOOD PRESSURE: 54 MMHG | HEART RATE: 68 BPM

## 2018-11-29 DIAGNOSIS — K91.89 ANASTOMOTIC LEAK OF INTESTINE: ICD-10-CM

## 2018-11-29 DIAGNOSIS — K91.89 GASTROINTESTINAL ANASTOMOTIC LEAK: ICD-10-CM

## 2018-11-29 DIAGNOSIS — K91.89 ANASTOMOTIC LEAK OF INTESTINE: Primary | ICD-10-CM

## 2018-11-29 DIAGNOSIS — K65.1 PELVIC ABSCESS IN MALE: ICD-10-CM

## 2018-11-29 LAB
ALBUMIN SERPL BCP-MCNC: 3 G/DL
ALP SERPL-CCNC: 56 U/L
ALT SERPL W/O P-5'-P-CCNC: 9 U/L
ANION GAP SERPL CALC-SCNC: 8 MMOL/L
AST SERPL-CCNC: 23 U/L
BASOPHILS # BLD AUTO: 0.03 K/UL
BASOPHILS NFR BLD: 0.2 %
BILIRUB SERPL-MCNC: 0.5 MG/DL
BUN SERPL-MCNC: 20 MG/DL
CALCIUM SERPL-MCNC: 9.3 MG/DL
CHLORIDE SERPL-SCNC: 102 MMOL/L
CO2 SERPL-SCNC: 18 MMOL/L
CREAT SERPL-MCNC: 2.7 MG/DL
DIFFERENTIAL METHOD: ABNORMAL
EOSINOPHIL # BLD AUTO: 0.2 K/UL
EOSINOPHIL NFR BLD: 1.6 %
ERYTHROCYTE [DISTWIDTH] IN BLOOD BY AUTOMATED COUNT: 15.7 %
EST. GFR  (AFRICAN AMERICAN): 27 ML/MIN/1.73 M^2
EST. GFR  (NON AFRICAN AMERICAN): 24 ML/MIN/1.73 M^2
GLUCOSE SERPL-MCNC: 140 MG/DL
HCT VFR BLD AUTO: 32.5 %
HGB BLD-MCNC: 11 G/DL
LYMPHOCYTES # BLD AUTO: 2 K/UL
LYMPHOCYTES NFR BLD: 15.2 %
MCH RBC QN AUTO: 27.7 PG
MCHC RBC AUTO-ENTMCNC: 33.8 G/DL
MCV RBC AUTO: 82 FL
MONOCYTES # BLD AUTO: 1.2 K/UL
MONOCYTES NFR BLD: 9.5 %
NEUTROPHILS # BLD AUTO: 9.4 K/UL
NEUTROPHILS NFR BLD: 73.5 %
PLATELET # BLD AUTO: 257 K/UL
PMV BLD AUTO: 10.6 FL
POTASSIUM SERPL-SCNC: 5.1 MMOL/L
PROT SERPL-MCNC: 7.5 G/DL
RBC # BLD AUTO: 3.97 M/UL
SODIUM SERPL-SCNC: 128 MMOL/L
WBC # BLD AUTO: 12.81 K/UL

## 2018-11-29 PROCEDURE — 85025 COMPLETE CBC W/AUTO DIFF WBC: CPT

## 2018-11-29 PROCEDURE — 99999 PR PBB SHADOW E&M-EST. PATIENT-LVL III: CPT | Mod: PBBFAC,,, | Performed by: SURGERY

## 2018-11-29 PROCEDURE — 80053 COMPREHEN METABOLIC PANEL: CPT

## 2018-11-29 PROCEDURE — 99024 POSTOP FOLLOW-UP VISIT: CPT | Mod: POP,,, | Performed by: SURGERY

## 2018-11-29 PROCEDURE — 36415 COLL VENOUS BLD VENIPUNCTURE: CPT

## 2018-11-29 PROCEDURE — 99213 OFFICE O/P EST LOW 20 MIN: CPT | Mod: PBBFAC | Performed by: SURGERY

## 2018-11-29 RX ORDER — DIPHENOXYLATE HYDROCHLORIDE AND ATROPINE SULFATE 2.5; .025 MG/1; MG/1
2 TABLET ORAL 4 TIMES DAILY
Qty: 240 TABLET | Refills: 2 | Status: SHIPPED | OUTPATIENT
Start: 2018-11-29 | End: 2018-12-09

## 2018-11-29 NOTE — PROGRESS NOTES
Subjective:       Patient ID: Yan Choudhury is a 63 y.o. male.    Anastomotic leak, pelvic abscess, diverting ileostomy    Chief Complaint: Post-op Evaluation    Patient states he feels better than we left the hospital.  One of the sutures came out of the drain.  The drain has purulent output but less according to his wife.  He is tolerating his wound VAC.  The on ileostomy is more formed he is on Metamucil no extra water and Lomotil 2 tablets 4 times a day      Review of Systems   Constitutional: Positive for fatigue.   HENT: Negative.    Eyes: Negative.    Respiratory: Negative.    Cardiovascular: Negative.    Gastrointestinal:        Ileostomy with output, wound VAC with improving wound, drain with some purulent output   Genitourinary: Negative.    Musculoskeletal: Negative.    Skin: Negative.        Objective:      Physical Exam   Constitutional: No distress.   Some degree of chronic illness   Pulmonary/Chest: Effort normal and breath sounds normal.   Abdominal: Soft. Bowel sounds are normal.   Wound VAC was removed.  The opening is nearly healed that is approximately 4-1/2 by 2-1/2 cm.  The midline incision is healed.  There is a well-formed ileostomy in the right upper quadrant.  There is a drain with serosanguineous/purulent drainage.  It was flushed with no significant return of purulent material   Vitals reviewed.      Vitals:    11/29/18 0943   BP: (!) 76/54   Pulse: 68   Temp: 97.6 °F (36.4 °C)   Weight: 86.2 kg (190 lb)     Assessment:       1. Anastomotic leak of intestine    2. Pelvic abscess in male    3. Gastrointestinal anastomotic leak        Plan:       Check CBC and CMP today.  Renew Lomotil  Continue to flush drain  Wound VAC can likely be removed in a week  Monitor the sutures the drain  Flush drain for home health  Follow-up in 10 days  Home health is monitoring his blood pressure.    Plan a Gastrografin enema in about 6 weeks

## 2018-11-29 NOTE — PATIENT INSTRUCTIONS
Continue to watch the output from the ileostomy.  If he gets real watery for more than a day let us know.    Monitor the drain it should start to clear up.  It is good that less is coming out of it.    If 1 of the sutures around the drain comes out that is okay if both come out please come to the office immediately or go to the emergency room.    We will let you know what year labs results are    Will plan a Gastrografin study to check the anastomosis in about 6 weeks

## 2018-11-30 ENCOUNTER — TELEPHONE (OUTPATIENT)
Dept: SURGERY | Facility: CLINIC | Age: 63
End: 2018-11-30

## 2018-11-30 NOTE — TELEPHONE ENCOUNTER
----- Message from Jaqueline Azul sent at 11/30/2018 11:10 AM CST -----  Contact: Elsie/wife  Elsie called to speak with the nurse; she stated she thought the patient was supposed to come in either this Monday or the next Monday. She needs to know when he has to come back in. She can be contacted at [phone].    Thanks,  Jaqueline

## 2018-12-10 ENCOUNTER — OFFICE VISIT (OUTPATIENT)
Dept: SURGERY | Facility: CLINIC | Age: 63
DRG: 394 | End: 2018-12-10
Payer: MEDICARE

## 2018-12-10 VITALS
WEIGHT: 173.06 LBS | DIASTOLIC BLOOD PRESSURE: 66 MMHG | BODY MASS INDEX: 26.31 KG/M2 | SYSTOLIC BLOOD PRESSURE: 104 MMHG | TEMPERATURE: 98 F | HEART RATE: 112 BPM

## 2018-12-10 DIAGNOSIS — K91.89 ANASTOMOTIC LEAK OF INTESTINE: Primary | ICD-10-CM

## 2018-12-10 PROCEDURE — 99024 POSTOP FOLLOW-UP VISIT: CPT | Mod: POP,,, | Performed by: SURGERY

## 2018-12-10 PROCEDURE — 99999 PR PBB SHADOW E&M-EST. PATIENT-LVL III: CPT | Mod: PBBFAC,,, | Performed by: SURGERY

## 2018-12-10 PROCEDURE — 99213 OFFICE O/P EST LOW 20 MIN: CPT | Mod: PBBFAC | Performed by: SURGERY

## 2018-12-10 NOTE — PROGRESS NOTES
Subjective:        Anastomotic leak with protective ileostomy       Patient ID: Yan Choudhury is a 63 y.o. male.    Chief Complaint: Post-op Evaluation (post op)    Patient underwent a colostomy reversal.  He was noted to have a anastomotic leak.  The staple line however.  To be intact.  A protective ileostomy was placed and a drain was left.    Patient reports that the ileostomy up with does become firm.  He notices that the drain output is now more serous than purulent.  He denies any fever or chills.  He does have fatigue      Review of Systems   Constitutional: Positive for fatigue.   Gastrointestinal:        Ileostomy drain and wound VAC       Objective:      Physical Exam   Constitutional:   Chronically ill   Cardiovascular: Normal rate and regular rhythm.   Pulmonary/Chest: Effort normal and breath sounds normal.   Abdominal: Soft.   Midline incision is healed.  The wound VAC was removed.  The old colostomy site has nearly granulated.  The drain is in the left lower abdomen.  It was flushed to there was no purulent output.  The sutures were replaced.  There is a well-formed right upper quadrant ileostomy   Vitals reviewed.      Assessment:      anastomotic leak.  The drain output is now less purulent.    The wound VAC is no longer needed as the ileostomy site is nearly healed     Plan:       Discontinue wound VAC  Antibiotic ointment and a Band-Aid  Check labs  Follow up in 2 weeks to check to drain.    The patient will need a Gastrografin enema in late December or early January to check for integrity of his anastomosis

## 2018-12-10 NOTE — PATIENT INSTRUCTIONS
It is okay to shower and get everything wet.  Just own let moist gauze sit on your skin.    He no longer need the wound VAC.  Please apply a small amount of antibiotic ointment and gauze.  You can use Neosporin, Polysporin, store brand work just as well    If both sutures come out of the drain please let us know

## 2018-12-11 ENCOUNTER — HOSPITAL ENCOUNTER (INPATIENT)
Facility: HOSPITAL | Age: 63
LOS: 4 days | Discharge: HOME HEALTH | DRG: 394 | End: 2018-12-16
Attending: EMERGENCY MEDICINE | Admitting: SURGERY
Payer: MEDICARE

## 2018-12-11 ENCOUNTER — TELEPHONE (OUTPATIENT)
Dept: SURGERY | Facility: HOSPITAL | Age: 63
End: 2018-12-11

## 2018-12-11 DIAGNOSIS — T81.9XXA COMPLICATION OF SURGICAL PROCEDURE: ICD-10-CM

## 2018-12-11 DIAGNOSIS — E86.0 DEHYDRATION: ICD-10-CM

## 2018-12-11 DIAGNOSIS — K91.89 OTHER POSTOPERATIVE COMPLICATION INVOLVING DIGESTIVE SYSTEM: Primary | ICD-10-CM

## 2018-12-11 LAB
POCT GLUCOSE: 184 MG/DL (ref 70–110)
POCT GLUCOSE: 190 MG/DL (ref 70–110)

## 2018-12-11 PROCEDURE — 63600175 PHARM REV CODE 636 W HCPCS: Performed by: SURGERY

## 2018-12-11 PROCEDURE — 82962 GLUCOSE BLOOD TEST: CPT

## 2018-12-11 PROCEDURE — 25000003 PHARM REV CODE 250: Performed by: EMERGENCY MEDICINE

## 2018-12-11 PROCEDURE — 25000003 PHARM REV CODE 250: Performed by: SURGERY

## 2018-12-11 PROCEDURE — 99285 EMERGENCY DEPT VISIT HI MDM: CPT | Mod: 25

## 2018-12-11 PROCEDURE — G0378 HOSPITAL OBSERVATION PER HR: HCPCS

## 2018-12-11 RX ORDER — SIMVASTATIN 5 MG/1
10 TABLET, FILM COATED ORAL NIGHTLY
Status: DISCONTINUED | OUTPATIENT
Start: 2018-12-11 | End: 2018-12-16 | Stop reason: HOSPADM

## 2018-12-11 RX ORDER — ONDANSETRON 8 MG/1
8 TABLET, ORALLY DISINTEGRATING ORAL EVERY 8 HOURS PRN
Status: DISCONTINUED | OUTPATIENT
Start: 2018-12-11 | End: 2018-12-16 | Stop reason: HOSPADM

## 2018-12-11 RX ORDER — ACETAMINOPHEN 325 MG/1
650 TABLET ORAL EVERY 8 HOURS PRN
Status: DISCONTINUED | OUTPATIENT
Start: 2018-12-11 | End: 2018-12-16 | Stop reason: HOSPADM

## 2018-12-11 RX ORDER — DIPHENOXYLATE HYDROCHLORIDE AND ATROPINE SULFATE 2.5; .025 MG/1; MG/1
1 TABLET ORAL 4 TIMES DAILY PRN
Status: DISCONTINUED | OUTPATIENT
Start: 2018-12-11 | End: 2018-12-12

## 2018-12-11 RX ORDER — IBUPROFEN 200 MG
24 TABLET ORAL
Status: DISCONTINUED | OUTPATIENT
Start: 2018-12-11 | End: 2018-12-16 | Stop reason: HOSPADM

## 2018-12-11 RX ORDER — INSULIN ASPART 100 [IU]/ML
1-10 INJECTION, SOLUTION INTRAVENOUS; SUBCUTANEOUS
Status: DISCONTINUED | OUTPATIENT
Start: 2018-12-11 | End: 2018-12-16 | Stop reason: HOSPADM

## 2018-12-11 RX ORDER — DIPHENHYDRAMINE HCL 25 MG
25 CAPSULE ORAL EVERY 4 HOURS PRN
Status: DISCONTINUED | OUTPATIENT
Start: 2018-12-11 | End: 2018-12-16 | Stop reason: HOSPADM

## 2018-12-11 RX ORDER — RAMELTEON 8 MG/1
8 TABLET ORAL NIGHTLY PRN
Status: DISCONTINUED | OUTPATIENT
Start: 2018-12-11 | End: 2018-12-16 | Stop reason: HOSPADM

## 2018-12-11 RX ORDER — CARVEDILOL 12.5 MG/1
25 TABLET ORAL DAILY
Status: DISCONTINUED | OUTPATIENT
Start: 2018-12-12 | End: 2018-12-16 | Stop reason: HOSPADM

## 2018-12-11 RX ORDER — GLUCAGON 1 MG
1 KIT INJECTION
Status: DISCONTINUED | OUTPATIENT
Start: 2018-12-11 | End: 2018-12-16 | Stop reason: HOSPADM

## 2018-12-11 RX ORDER — DIPHENHYDRAMINE HYDROCHLORIDE 50 MG/ML
25 INJECTION INTRAMUSCULAR; INTRAVENOUS EVERY 4 HOURS PRN
Status: DISCONTINUED | OUTPATIENT
Start: 2018-12-11 | End: 2018-12-11 | Stop reason: RX

## 2018-12-11 RX ORDER — HYDROCODONE BITARTRATE AND ACETAMINOPHEN 5; 325 MG/1; MG/1
1 TABLET ORAL EVERY 6 HOURS PRN
Status: DISCONTINUED | OUTPATIENT
Start: 2018-12-11 | End: 2018-12-16 | Stop reason: HOSPADM

## 2018-12-11 RX ORDER — ENOXAPARIN SODIUM 100 MG/ML
30 INJECTION SUBCUTANEOUS EVERY 24 HOURS
Status: DISCONTINUED | OUTPATIENT
Start: 2018-12-11 | End: 2018-12-16 | Stop reason: HOSPADM

## 2018-12-11 RX ORDER — BUMETANIDE 1 MG/1
1 TABLET ORAL DAILY
Status: DISCONTINUED | OUTPATIENT
Start: 2018-12-12 | End: 2018-12-11

## 2018-12-11 RX ORDER — DIPHENHYDRAMINE HCL 25 MG
25 CAPSULE ORAL EVERY 6 HOURS PRN
Status: DISCONTINUED | OUTPATIENT
Start: 2018-12-11 | End: 2018-12-11 | Stop reason: DRUGHIGH

## 2018-12-11 RX ORDER — IBUPROFEN 200 MG
16 TABLET ORAL
Status: DISCONTINUED | OUTPATIENT
Start: 2018-12-11 | End: 2018-12-16 | Stop reason: HOSPADM

## 2018-12-11 RX ORDER — ALLOPURINOL 100 MG/1
100 TABLET ORAL DAILY
Status: DISCONTINUED | OUTPATIENT
Start: 2018-12-12 | End: 2018-12-16 | Stop reason: HOSPADM

## 2018-12-11 RX ORDER — LISINOPRIL 5 MG/1
5 TABLET ORAL DAILY
Status: DISCONTINUED | OUTPATIENT
Start: 2018-12-12 | End: 2018-12-11

## 2018-12-11 RX ORDER — PANTOPRAZOLE SODIUM 40 MG/1
40 TABLET, DELAYED RELEASE ORAL DAILY
Status: DISCONTINUED | OUTPATIENT
Start: 2018-12-12 | End: 2018-12-16 | Stop reason: HOSPADM

## 2018-12-11 RX ORDER — ASPIRIN 81 MG/1
81 TABLET ORAL EVERY MORNING
Status: DISCONTINUED | OUTPATIENT
Start: 2018-12-12 | End: 2018-12-16 | Stop reason: HOSPADM

## 2018-12-11 RX ORDER — SODIUM CHLORIDE 0.9 % (FLUSH) 0.9 %
3 SYRINGE (ML) INJECTION
Status: DISCONTINUED | OUTPATIENT
Start: 2018-12-11 | End: 2018-12-16 | Stop reason: HOSPADM

## 2018-12-11 RX ORDER — SODIUM CHLORIDE 9 MG/ML
1000 INJECTION, SOLUTION INTRAVENOUS CONTINUOUS
Status: ACTIVE | OUTPATIENT
Start: 2018-12-11 | End: 2018-12-12

## 2018-12-11 RX ORDER — SODIUM CHLORIDE 9 MG/ML
1000 INJECTION, SOLUTION INTRAVENOUS CONTINUOUS
Status: DISCONTINUED | OUTPATIENT
Start: 2018-12-11 | End: 2018-12-11

## 2018-12-11 RX ADMIN — BACITRACIN ZINC NEOMYCIN SULFATE POLYMYXIN B SULFATE: 400; 3.5; 5 OINTMENT TOPICAL at 10:12

## 2018-12-11 RX ADMIN — SODIUM CHLORIDE 1000 ML: 0.9 INJECTION, SOLUTION INTRAVENOUS at 07:12

## 2018-12-11 RX ADMIN — SODIUM CHLORIDE 1000 ML: 0.9 INJECTION, SOLUTION INTRAVENOUS at 10:12

## 2018-12-11 RX ADMIN — BIMATOPROST 1 DROP: 0.1 SOLUTION/ DROPS OPHTHALMIC at 10:12

## 2018-12-11 RX ADMIN — INSULIN ASPART 1 UNITS: 100 INJECTION, SOLUTION INTRAVENOUS; SUBCUTANEOUS at 11:12

## 2018-12-11 RX ADMIN — SIMVASTATIN 10 MG: 5 TABLET, FILM COATED ORAL at 08:12

## 2018-12-11 RX ADMIN — ENOXAPARIN SODIUM 30 MG: 100 INJECTION SUBCUTANEOUS at 08:12

## 2018-12-11 NOTE — ED PROVIDER NOTES
SCRIBE #1 NOTE: I, Jessi Jones, am scribing for, and in the presence of, Rah Long MD. I have scribed the entire note.      History      Chief Complaint   Patient presents with    Dehydration     sent from Dr Lindsay's office for dehydration       Review of patient's allergies indicates:  No Known Allergies     HPI   HPI    12/11/2018, 5:55 PM   History obtained from the patient      History of Present Illness: Yan Choudhury is a 63 y.o. male patient with a PMHx of CAD, CHF, CKD, DM, HTN, HLD, Ischemic Cardiomyopathy, Pulmonary HTN, JAHAIRA who presents to the Emergency Department for IVF. Pt was referred to the ED by Dr. Lindsay for admission for IVF. Pt's creatinine was 4.6, BUN was 60 yesterday. Pt reports Dr. Lindsay attempted a colostomy reversal on 10/30 but was unable to and converted to an ileostomy. Pt's only complaint is decreased urine output. He denies any fever, chills, abd pain, CP, SOB, emesis, and all other sxs at this time. No further complaints or concerns.       Arrival mode: Personal vehicle      PCP: Sandra Estevez MD       Past Medical History:  Past Medical History:   Diagnosis Date    Arthritis     CAD (coronary artery disease)     Cataract     CHF (congestive heart failure)     Dr Calvillo    Chronic combined systolic and diastolic congestive heart failure 3/24/2015    CKD (chronic kidney disease), stage III     Diabetes mellitus type II      AM    Diabetic retinopathy     anti Veg F injections for macular edema    Diverticulitis of colon     ED (erectile dysfunction)     Glaucoma     HTN (hypertension)     Hyperlipidemia     Ischemic cardiomyopathy     Obesity     JAHAIRA on CPAP     Pacemaker     Pulmonary HTN        Past Surgical History:  Past Surgical History:   Procedure Laterality Date    CARDIAC CATHETERIZATION  2009    CHOLECYSTECTOMY      COLECTOMY-SIGMOID N/A 4/22/2016    Per[facility] by Kevin Lindsay MD at [facility] OR    COLONOSCOPY N/A 10/11/2018     Procedure: COLONOSCOPY;  Surgeon: Quinn Aragon MD;  Location: [facility] ENDO;  Service: General;  Laterality: N/A;    COLONOSCOPY N/A 10/11/2018    Performed by Quinn Aragon MD at [facility] ENDO    COLOSTOMY N/A 4/22/2016    Performed by Kevin Lindsay MD at [facility] OR    CREATION, ILEOSTOMY N/A 10/30/2018    Performed by Kevin Lindsay MD at [facility] OR    EYE SURGERY      HEMICOLECTOMY, RIGHT Right 10/30/2018    Performed by Kevin Lindsay MD at [facility] OR    ILEOSTOMY N/A 10/30/2018    Procedure: CREATION, ILEOSTOMY;  Surgeon: Kevin Lindsay MD;  Location: [facility] OR;  Service: General;  Laterality: N/A;    KNEE SURGERY      MOBILIZATION OF SPLENIC FLEXURE N/A 10/30/2018    Procedure: MOBILIZATION, SPLENIC FLEXURE;  Surgeon: Kevin Lindsay MD;  Location: [facility] OR;  Service: General;  Laterality: N/A;    MOBILIZATION, SPLENIC FLEXURE N/A 10/30/2018    Performed by Kevin Lindsay MD at [facility] OR    REVISION COLOSTOMY N/A 10/30/2018    Procedure: REVISION OR CLOSURE, COLOSTOMY;  Surgeon: Kevin Lindsay MD;  Location: [facility] OR;  Service: General;  Laterality: N/A;  Parastomal Hernia Repair    REVISION OR CLOSURE, COLOSTOMY N/A 10/30/2018    Performed by Kevin Lindsay MD at [facility] OR    RIGHT HEMICOLECTOMY Right 10/30/2018    Procedure: HEMICOLECTOMY, RIGHT;  Surgeon: Kevin Lindsay MD;  Location: [facility];  Service: General;  Laterality: Right;         Family History:  Family History   Problem Relation Age of Onset    Cancer Mother     Heart disease Father     Stroke Father     No Known Problems Sister     No Known Problems Brother     No Known Problems Maternal Aunt     No Known Problems Maternal Uncle     No Known Problems Paternal Aunt     No Known Problems Paternal Uncle     No Known Problems Maternal Grandmother     No Known Problems Maternal Grandfather     No Known Problems Paternal Grandmother     No Known Problems Paternal Grandfather     Amblyopia Neg Hx     Blindness  Neg Hx     Cataracts Neg Hx     Diabetes Neg Hx     Glaucoma Neg Hx     Hypertension Neg Hx     Macular degeneration Neg Hx     Retinal detachment Neg Hx     Strabismus Neg Hx     Thyroid disease Neg Hx        Social History:  Social History     Tobacco Use    Smoking status: Never Smoker    Smokeless tobacco: Never Used   Substance and Sexual Activity    Alcohol use: Yes     Alcohol/week: 0.0 oz     Comment: NO ALCOHOL 72 HOURS PRIOR TO SX    Drug use: No    Sexual activity: Not Currently       ROS   Review of Systems   Constitutional: Negative for chills and fever.   HENT: Negative for sore throat.    Respiratory: Negative for shortness of breath.    Cardiovascular: Negative for chest pain.   Gastrointestinal: Negative for abdominal pain and vomiting.   Genitourinary: Positive for decreased urine volume. Negative for dysuria.   Musculoskeletal: Negative for back pain.   Skin: Negative for rash.   Neurological: Negative for weakness.   Hematological: Does not bruise/bleed easily.   All other systems reviewed and are negative.    Physical Exam      Initial Vitals [12/11/18 1747]   BP Pulse Resp Temp SpO2   99/61 83 20 98.1 °F (36.7 °C) 100 %      MAP       --          Physical Exam  Nursing Notes and Vital Signs Reviewed.  Constitutional: Patient is in no acute distress. Well-developed and well-nourished.  Head: Atraumatic. Normocephalic.  Eyes: PERRL. EOM intact. Conjunctivae are not pale. No scleral icterus.  ENT: Mucous membranes are moist. Oropharynx is clear and symmetric.    Neck: Supple. Full ROM. No lymphadenopathy.  Cardiovascular: Regular rate. Regular rhythm. No murmurs, rubs, or gallops. Distal pulses are 2+ and symmetric.  Pulmonary/Chest: No respiratory distress. Clear to auscultation bilaterally. No wheezing or rales.  Abdominal: Soft and non-distended.  There is no tenderness.  No rebound, guarding, or rigidity. Good bowel sounds. Ileostomy appears intact. KINDRA drain to abd appears intact.  "No signs of infection.   Musculoskeletal: Moves all extremities. No obvious deformities. No edema. No calf tenderness.  Skin: Warm and dry.  Neurological:  Alert, awake, and appropriate.  Normal speech.  No acute focal neurological deficits are appreciated.  Psychiatric: Normal affect. Good eye contact. Appropriate in content.    ED Course    Procedures  ED Vital Signs:  Vitals:    12/11/18 1747 12/11/18 1942 12/11/18 2357 12/12/18 0023   BP: 99/61 105/67  98/66   Pulse: 83 (!) 114 (!) 112 109   Resp: 20 18 18    Temp: 98.1 °F (36.7 °C) 97.7 °F (36.5 °C) 97.4 °F (36.3 °C)    TempSrc: Oral Oral Oral    SpO2: 100% 98% 96%    Weight: 79.2 kg (174 lb 9.7 oz)      Height: 5' 8" (1.727 m)          Results for AURELIO KEYES (MRN [id number]) as of 12/11/2018 17:59   Ref. Range 12/10/2018 15:17   WBC Latest Ref Range: 3.90 - 12.70 K/uL 12.80 (H)   RBC Latest Ref Range: 4.60 - 6.20 M/uL 4.69   Hemoglobin Latest Ref Range: 14.0 - 18.0 g/dL 12.7 (L)   Hematocrit Latest Ref Range: 40.0 - 54.0 % 39.4 (L)   MCV Latest Ref Range: 82 - 98 fL 84   MCH Latest Ref Range: 27.0 - 31.0 pg 27.1   MCHC Latest Ref Range: 32.0 - 36.0 g/dL 32.2   RDW Latest Ref Range: 11.5 - 14.5 % 16.3 (H)   Platelets Latest Ref Range: 150 - 350 K/uL 333   MPV Latest Ref Range: 9.2 - 12.9 fL 12.9   Gran% Latest Ref Range: 38.0 - 73.0 % 66.9   Gran # (ANC) Latest Ref Range: 1.8 - 7.7 K/uL 8.6 (H)   Immature Granulocytes Latest Ref Range: 0.0 - 0.5 % 0.6 (H)   Immature Grans (Abs) Latest Ref Range: 0.00 - 0.04 K/uL 0.08 (H)   Lymph% Latest Ref Range: 18.0 - 48.0 % 20.9   Lymph # Latest Ref Range: 1.0 - 4.8 K/uL 2.7   Mono% Latest Ref Range: 4.0 - 15.0 % 10.2   Mono # Latest Ref Range: 0.3 - 1.0 K/uL 1.3 (H)   Eosinophil% Latest Ref Range: 0.0 - 8.0 % 0.7   Eos # Latest Ref Range: 0.0 - 0.5 K/uL 0.1   Basophil% Latest Ref Range: 0.0 - 1.9 % 0.7   Baso # Latest Ref Range: 0.00 - 0.20 K/uL 0.09   nRBC Latest Ref Range: 0 /100 WBC 0   Differential Method Unknown " Automated   Sodium Latest Ref Range: 136 - 145 mmol/L 128 (L)   Potassium Latest Ref Range: 3.5 - 5.1 mmol/L 5.2 (H)   Chloride Latest Ref Range: 95 - 110 mmol/L 91 (L)   CO2 Latest Ref Range: 23 - 29 mmol/L 26   Anion Gap Latest Ref Range: 8 - 16 mmol/L 11   BUN, Bld Latest Ref Range: 8 - 23 mg/dL 60 (H)   Creatinine Latest Ref Range: 0.5 - 1.4 mg/dL 4.6 (H)   eGFR if non African American Latest Ref Range: >60 mL/min/1.73 m^2 12.4 (A)   eGFR if African American Latest Ref Range: >60 mL/min/1.73 m^2 14.4 (A)   Glucose Latest Ref Range: 70 - 110 mg/dL 166 (H)   Calcium Latest Ref Range: 8.7 - 10.5 mg/dL 9.6   Alkaline Phosphatase Latest Ref Range: 55 - 135 U/L 74   Total Protein Latest Ref Range: 6.0 - 8.4 g/dL 8.5 (H)   Albumin Latest Ref Range: 3.5 - 5.2 g/dL 3.4 (L)   Total Bilirubin Latest Ref Range: 0.1 - 1.0 mg/dL 0.9   AST Latest Ref Range: 10 - 40 U/L 42 (H)   ALT Latest Ref Range: 10 - 44 U/L 16            The Emergency Provider reviewed the vital signs and test results, which are outlined above.    ED Discussion     6:12 PM: Discussed case with Dr. Lindsay (General Surgery). Dr. Lindsay agrees with current care and management of pt and accepts admission. Dr. Lindsay will put in orders.   Admitting Service: General Surgery  Admitting Physician: Dr. Lindsay  Admit to: Med/surg (Observation)    6:14 PM: Re-evaluated pt. I have discussed test results, shared treatment plan, and the need for admission with patient and family at bedside. Pt and family express understanding at this time and agree with all information. All questions answered. Pt and family have no further questions or concerns at this time. Pt is ready for admit.    ED Medication(s):  Medications   allopurinol tablet 100 mg (not administered)   aspirin EC tablet 81 mg (not administered)   bimatoprost 0.01 % ophthalmic drops 1 drop (1 drop Right Eye Given 12/11/18 3745)   carvedilol tablet 25 mg (not administered)   insulin detemir U-100 pen  15 Units (not administered)   HYDROcodone-acetaminophen 5-325 mg per tablet 1 tablet (not administered)   pantoprazole EC tablet 40 mg (not administered)   simvastatin tablet 10 mg (10 mg Oral Given 12/11/18 2037)   diphenoxylate-atropine 2.5-0.025 mg per tablet 1 tablet (not administered)   sodium chloride 0.9% flush 3 mL (not administered)   ondansetron disintegrating tablet 8 mg (not administered)   glucose chewable tablet 16 g (not administered)   glucose chewable tablet 24 g (not administered)   dextrose 50% injection 12.5 g (not administered)   dextrose 50% injection 25 g (not administered)   glucagon (human recombinant) injection 1 mg (not administered)   ramelteon tablet 8 mg (not administered)   acetaminophen tablet 650 mg (not administered)   enoxaparin injection 30 mg (30 mg Subcutaneous Given 12/11/18 2037)   insulin aspart U-100 pen 1-10 Units (1 Units Subcutaneous Given 12/11/18 2320)   neomycin-bacitracin-polymyxin ointment ( Topical (Top) Given 12/11/18 2251)   diphenhydrAMINE capsule 25 mg (not administered)   0.9%  NaCl infusion (1,000 mLs Intravenous New Bag 12/11/18 2242)             Medical Decision Making    Medical Decision Making:   History:   Old Medical Records: I decided to obtain old medical records.  Old Records Summarized: records from clinic visits.           Scribe Attestation:   Scribe #1: I performed the above scribed service and the documentation accurately describes the services I performed. I attest to the accuracy of the note.    Attending:   Physician Attestation Statement for Scribe #1: I, Rah Long MD, personally performed the services described in this documentation, as scribed by Jessi Jones, in my presence, and it is both accurate and complete.          Clinical Impression       ICD-10-CM ICD-9-CM   1. Other postoperative complication involving digestive system K91.89 997.49   2. Dehydration E86.0 276.51       Disposition:   Disposition: Placed in  Observation  Condition: Ananth Long MD  12/12/18 0047

## 2018-12-11 NOTE — TELEPHONE ENCOUNTER
Patient's laboratory.  His BUN is a 60 and his creatinine is 4.6.  These are elevated from where they were 12 days ago.  This is likely due to high ileostomy output.  He will need to be admitted for IV fluid

## 2018-12-11 NOTE — ED NOTES
Bed: 01  Expected date:   Expected time:   Means of arrival: Personal Transportation  Comments:     Rosie Hernández RN  12/11/18 0949

## 2018-12-12 PROBLEM — N17.9 ACUTE RENAL FAILURE: Status: ACTIVE | Noted: 2018-12-12

## 2018-12-12 PROBLEM — R23.9 ALTERATION IN SKIN INTEGRITY: Status: ACTIVE | Noted: 2018-12-12

## 2018-12-12 LAB
ANION GAP SERPL CALC-SCNC: 10 MMOL/L
BILIRUB UR QL STRIP: NEGATIVE
BUN SERPL-MCNC: 61 MG/DL
CALCIUM SERPL-MCNC: 9.4 MG/DL
CHLORIDE SERPL-SCNC: 98 MMOL/L
CLARITY UR: CLEAR
CO2 SERPL-SCNC: 21 MMOL/L
COLOR UR: YELLOW
CREAT SERPL-MCNC: 4 MG/DL
CREAT UR-MCNC: 187.6 MG/DL
EST. GFR  (AFRICAN AMERICAN): 17 ML/MIN/1.73 M^2
EST. GFR  (NON AFRICAN AMERICAN): 15 ML/MIN/1.73 M^2
GLUCOSE SERPL-MCNC: 144 MG/DL
GLUCOSE UR QL STRIP: NEGATIVE
HGB UR QL STRIP: ABNORMAL
KETONES UR QL STRIP: NEGATIVE
LEUKOCYTE ESTERASE UR QL STRIP: NEGATIVE
NITRITE UR QL STRIP: NEGATIVE
PH UR STRIP: 5 [PH] (ref 5–8)
POCT GLUCOSE: 109 MG/DL (ref 70–110)
POCT GLUCOSE: 113 MG/DL (ref 70–110)
POCT GLUCOSE: 145 MG/DL (ref 70–110)
POCT GLUCOSE: 233 MG/DL (ref 70–110)
POTASSIUM SERPL-SCNC: 4.8 MMOL/L
PROT UR QL STRIP: ABNORMAL
SODIUM SERPL-SCNC: 129 MMOL/L
SODIUM UR-SCNC: <20 MMOL/L
SP GR UR STRIP: 1.02 (ref 1–1.03)
URN SPEC COLLECT METH UR: ABNORMAL
UROBILINOGEN UR STRIP-ACNC: NEGATIVE EU/DL

## 2018-12-12 PROCEDURE — 84300 ASSAY OF URINE SODIUM: CPT

## 2018-12-12 PROCEDURE — 36415 COLL VENOUS BLD VENIPUNCTURE: CPT

## 2018-12-12 PROCEDURE — 99214 PR OFFICE/OUTPT VISIT, EST, LEVL IV, 30-39 MIN: ICD-10-PCS | Mod: ,,, | Performed by: INTERNAL MEDICINE

## 2018-12-12 PROCEDURE — S5571 INSULIN DISPOS PEN 3 ML: HCPCS | Performed by: SURGERY

## 2018-12-12 PROCEDURE — 99214 OFFICE O/P EST MOD 30 MIN: CPT | Mod: ,,, | Performed by: INTERNAL MEDICINE

## 2018-12-12 PROCEDURE — 25000003 PHARM REV CODE 250: Performed by: SURGERY

## 2018-12-12 PROCEDURE — 82570 ASSAY OF URINE CREATININE: CPT

## 2018-12-12 PROCEDURE — 80048 BASIC METABOLIC PNL TOTAL CA: CPT

## 2018-12-12 PROCEDURE — 81003 URINALYSIS AUTO W/O SCOPE: CPT

## 2018-12-12 PROCEDURE — 63600175 PHARM REV CODE 636 W HCPCS: Performed by: SURGERY

## 2018-12-12 PROCEDURE — 11000001 HC ACUTE MED/SURG PRIVATE ROOM

## 2018-12-12 RX ORDER — DIPHENOXYLATE HYDROCHLORIDE AND ATROPINE SULFATE 2.5; .025 MG/1; MG/1
1 TABLET ORAL 4 TIMES DAILY
Status: DISCONTINUED | OUTPATIENT
Start: 2018-12-12 | End: 2018-12-16 | Stop reason: HOSPADM

## 2018-12-12 RX ORDER — SODIUM CHLORIDE 9 MG/ML
1000 INJECTION, SOLUTION INTRAVENOUS CONTINUOUS
Status: DISCONTINUED | OUTPATIENT
Start: 2018-12-12 | End: 2018-12-13

## 2018-12-12 RX ADMIN — DIPHENOXYLATE HYDROCHLORIDE AND ATROPINE SULFATE 1 TABLET: 2.5; .025 TABLET ORAL at 12:12

## 2018-12-12 RX ADMIN — DIPHENOXYLATE HYDROCHLORIDE AND ATROPINE SULFATE 1 TABLET: 2.5; .025 TABLET ORAL at 09:12

## 2018-12-12 RX ADMIN — INSULIN DETEMIR 15 UNITS: 100 INJECTION, SOLUTION SUBCUTANEOUS at 09:12

## 2018-12-12 RX ADMIN — ASPIRIN 81 MG: 81 TABLET, COATED ORAL at 07:12

## 2018-12-12 RX ADMIN — BACITRACIN ZINC NEOMYCIN SULFATE POLYMYXIN B SULFATE: 400; 3.5; 5 OINTMENT TOPICAL at 09:12

## 2018-12-12 RX ADMIN — BACITRACIN ZINC NEOMYCIN SULFATE POLYMYXIN B SULFATE: 400; 3.5; 5 OINTMENT TOPICAL at 12:12

## 2018-12-12 RX ADMIN — BIMATOPROST 1 DROP: 0.1 SOLUTION/ DROPS OPHTHALMIC at 09:12

## 2018-12-12 RX ADMIN — DIPHENOXYLATE HYDROCHLORIDE AND ATROPINE SULFATE 1 TABLET: 2.5; .025 TABLET ORAL at 05:12

## 2018-12-12 RX ADMIN — ENOXAPARIN SODIUM 30 MG: 100 INJECTION SUBCUTANEOUS at 05:12

## 2018-12-12 RX ADMIN — CARVEDILOL 25 MG: 12.5 TABLET, FILM COATED ORAL at 09:12

## 2018-12-12 RX ADMIN — ALLOPURINOL 100 MG: 100 TABLET ORAL at 09:12

## 2018-12-12 RX ADMIN — INSULIN ASPART 4 UNITS: 100 INJECTION, SOLUTION INTRAVENOUS; SUBCUTANEOUS at 12:12

## 2018-12-12 RX ADMIN — SODIUM CHLORIDE 1000 ML: 0.9 INJECTION, SOLUTION INTRAVENOUS at 09:12

## 2018-12-12 RX ADMIN — SODIUM CHLORIDE 1000 ML: 0.9 INJECTION, SOLUTION INTRAVENOUS at 05:12

## 2018-12-12 RX ADMIN — PANTOPRAZOLE SODIUM 40 MG: 40 TABLET, DELAYED RELEASE ORAL at 09:12

## 2018-12-12 RX ADMIN — SIMVASTATIN 10 MG: 5 TABLET, FILM COATED ORAL at 09:12

## 2018-12-12 NOTE — NURSING
Chart reviewed for diabetes  Patient seen by this nurse on recent admit  Please consult if any diabetes educational needs are identified

## 2018-12-12 NOTE — SUBJECTIVE & OBJECTIVE
Past Medical History:   Diagnosis Date    Arthritis     CAD (coronary artery disease)     Cataract     CHF (congestive heart failure)     Dr Calvillo    Chronic combined systolic and diastolic congestive heart failure 3/24/2015    CKD (chronic kidney disease), stage III     Diabetes mellitus type II      AM    Diabetic retinopathy     anti Veg F injections for macular edema    Diverticulitis of colon     ED (erectile dysfunction)     Glaucoma     HTN (hypertension)     Hyperlipidemia     Ischemic cardiomyopathy     Obesity     JAHAIRA on CPAP     Pacemaker     Pulmonary HTN        Past Surgical History:   Procedure Laterality Date    CARDIAC CATHETERIZATION  2009    CHOLECYSTECTOMY      COLECTOMY-SIGMOID N/A 4/22/2016    [facility] at [facility] OR    COLONOSCOPY N/A 10/11/2018    Procedure: COLONOSCOPY;  Surgeon: Quinn Aragon MD;  Location: [facility] ENDO;  Service: General;  Laterality: N/A;    COLONOSCOPY N/A 10/11/2018    Performed by Quinn Aragon MD at [facility] ENDO    COLOSTOMY N/A 4/22/2016    Performed by Kevin Lindsay MD at [facility] OR    CREATION, ILEOSTOMY N/A 10/30/2018    Performed by Kevin Lindsay MD at [facility] OR    EYE SURGERY      HEMICOLECTOMY, RIGHT Right 10/30/2018    Performed by Kevin Lindsay MD at [facility] OR    ILEOSTOMY N/A 10/30/2018    Procedure: CREATION, ILEOSTOMY;  Surgeon: Kevin Lindsay MD;  Location: [facility] OR;  Service: General;  Laterality: N/A;    KNEE SURGERY      MOBILIZATION OF SPLENIC FLEXURE N/A 10/30/2018    Procedure: MOBILIZATION, SPLENIC FLEXURE;  Surgeon: Kevin Lindsay MD;  Location: [facility] OR;  Service: General;  Laterality: N/A;    MOBILIZATION, SPLENIC FLEXURE N/A 10/30/2018    Performed by Kevin Lindsay MD at [facility] OR    REVISION COLOSTOMY N/A 10/30/2018    Procedure: REVISION OR CLOSURE, COLOSTOMY;  Surgeon: Kevin Lindsay MD;  Location: [facility] OR;  Service: General;  Laterality: N/A;  Parastomal Hernia  Repair    REVISION OR CLOSURE, COLOSTOMY N/A 10/30/2018    Per[facility] by [facility] at [facility] OR    RIGHT HEMICOLECTOMY Right 10/30/2018    Procedure: HEMICOLECTOMY, RIGHT;  Surgeon: Kevin Lindsay MD;  Location: [facility] OR;  Service: General;  Laterality: Right;       Review of patient's allergies indicates:  No Known Allergies  Current Facility-Administered Medications   Medication Frequency    0.9%  NaCl infusion Continuous    acetaminophen tablet 650 mg Q8H PRN    allopurinol tablet 100 mg Daily    aspirin EC tablet 81 mg QAM    bimatoprost 0.01 % ophthalmic drops 1 drop QHS    carvedilol tablet 25 mg Daily    dextrose 50% injection 12.5 g PRN    dextrose 50% injection 25 g PRN    diphenhydrAMINE capsule 25 mg Q4H PRN    diphenoxylate-atropine 2.5-0.025 mg per tablet 1 tablet QID    enoxaparin injection 30 mg Daily    glucagon (human recombinant) injection 1 mg PRN    glucose chewable tablet 16 g PRN    glucose chewable tablet 24 g PRN    HYDROcodone-acetaminophen 5-325 mg per tablet 1 tablet Q6H PRN    insulin aspart U-100 pen 1-10 Units QID (AC + HS) PRN    insulin detemir U-100 pen 15 Units Daily    neomycin-bacitracin-polymyxin ointment BID    ondansetron disintegrating tablet 8 mg Q8H PRN    pantoprazole EC tablet 40 mg Daily    ramelteon tablet 8 mg Nightly PRN    simvastatin tablet 10 mg QHS    sodium chloride 0.9% flush 3 mL PRN     Family History     Problem Relation (Age of Onset)    Cancer Mother    Heart disease Father    No Known Problems Sister, Brother, Maternal Aunt, Maternal Uncle, Paternal Aunt, Paternal Uncle, Maternal Grandmother, Maternal Grandfather, Paternal Grandmother, Paternal Grandfather    Stroke Father        Tobacco Use    Smoking status: Never Smoker    Smokeless tobacco: Never Used   Substance and Sexual Activity    Alcohol use: Yes     Alcohol/week: 0.0 oz     Comment: NO ALCOHOL 72 HOURS PRIOR TO SX    Drug use: No    Sexual activity: Not  Currently     Review of Systems   Constitutional: Positive for unexpected weight change. Negative for activity change, appetite change, chills, diaphoresis, fatigue and fever.   HENT: Negative.  Negative for congestion and trouble swallowing.    Eyes: Negative.    Respiratory: Negative.  Negative for cough, chest tightness, shortness of breath and wheezing.    Cardiovascular: Negative.  Negative for chest pain, palpitations and leg swelling.   Gastrointestinal: Negative.  Negative for abdominal distention, abdominal pain, nausea and vomiting.   Genitourinary: Negative.  Negative for decreased urine volume, difficulty urinating, dysuria, enuresis, flank pain, frequency, hematuria, penile swelling, scrotal swelling and urgency.   Musculoskeletal: Negative.  Negative for arthralgias, back pain, joint swelling and neck pain.   Skin: Negative for rash.   Neurological: Negative.  Negative for tremors, seizures and headaches.   Psychiatric/Behavioral: Negative.  Negative for confusion and sleep disturbance. The patient is not nervous/anxious.      Objective:     Vital Signs (Most Recent):  Temp: 97 °F (36.1 °C) (12/12/18 1213)  Pulse: 101 (12/12/18 1213)  Resp: 16 (12/12/18 1213)  BP: 102/63 (12/12/18 1213)  SpO2: 98 % (12/12/18 1213)  O2 Device (Oxygen Therapy): room air (12/12/18 0835) Vital Signs (24h Range):  Temp:  [97 °F (36.1 °C)-98.2 °F (36.8 °C)] 97 °F (36.1 °C)  Pulse:  [] 101  Resp:  [16-20] 16  SpO2:  [96 %-100 %] 98 %  BP: ()/(61-69) 102/63     Weight: 78.2 kg (172 lb 6.4 oz) (12/11/18 1942)  Body mass index is 26.21 kg/m².  Body surface area is 1.94 meters squared.    I/O last 3 completed shifts:  In: 120 [P.O.:120]  Out: 200 [Urine:200]    Physical Exam   Constitutional: He is oriented to person, place, and time. He appears well-developed and well-nourished. No distress.   Chronically ill   HENT:   Head: Normocephalic.   Eyes: EOM are normal. Pupils are equal, round, and reactive to light.    Neck: Normal range of motion. Neck supple. No JVD present. No thyromegaly present.   Cardiovascular: Normal rate, regular rhythm, S1 normal, S2 normal and intact distal pulses. PMI is not displaced. Exam reveals no gallop and no friction rub.   Murmur heard.  No evidence of CHF at this time.   Pulmonary/Chest: Effort normal and breath sounds normal. No respiratory distress. He has no wheezes. He has no rales. He exhibits no tenderness.   Abdominal: Soft. Bowel sounds are normal. He exhibits no distension and no mass. There is no hepatosplenomegaly. There is no tenderness. There is no rebound and no CVA tenderness. No hernia.   There is a drain in the lower abdomen with serosanguineous output.  There is a old colostomy site with granulation tissue.  There is a well-formed right upper quadrant loop ileostomy   Musculoskeletal: Normal range of motion. He exhibits no edema or tenderness.   Lymphadenopathy:     He has no cervical adenopathy.   Neurological: He is alert and oriented to person, place, and time. He has normal reflexes. He is not disoriented. He displays normal reflexes. No cranial nerve deficit. He exhibits normal muscle tone. Coordination normal.   Skin: Skin is warm and dry. Capillary refill takes less than 2 seconds. No rash noted. No erythema.   Psychiatric: He has a normal mood and affect. His behavior is normal. Judgment and thought content normal.   Nursing note and vitals reviewed.      Significant Labs:  All labs within the past 24 hours have been reviewed.    Significant Imaging:  Labs: Reviewed

## 2018-12-12 NOTE — PLAN OF CARE
12/12/18 1324   DINERO Message   Medicare Outpatient and Observation Notification regarding financial responsibility Given to patient/caregiver;Explained to patient/caregiver;Signed/date by patient/caregiver   Date DINERO was signed 12/12/18   Time DINERO was signed 1321

## 2018-12-12 NOTE — SUBJECTIVE & OBJECTIVE
"No current facility-administered medications on file prior to encounter.      Current Outpatient Medications on File Prior to Encounter   Medication Sig    allopurinol (ZYLOPRIM) 100 MG tablet TAKE 1 TABLET (100 MG TOTAL) BY MOUTH ONCE DAILY. (Patient taking differently: TAKE 1 TABLET (100 MG TOTAL) BY MOUTH ONCE DAILY./ takes in AM)    aspirin (ECOTRIN) 81 MG EC tablet Take 81 mg by mouth every morning.     BD INSULIN SYRINGE ULTRA-FINE 0.5 mL 31 gauge x 5/16 Syrg USE AS DIRECTED TO INJECT INSULIN TWO TIMES DAILY    bimatoprost (LUMIGAN) 0.03 % ophthalmic drops Place 1 drop into the left eye every evening. (Patient taking differently: Place 1 drop into the right eye every evening. )    blood sugar diagnostic Strp 1 strip by Misc.(Non-Drug; Combo Route) route 4 (four) times daily. [facility]    bumetanide (BUMEX) 2 MG tablet Take 1 tablet (2 mg total) by mouth 2 (two) times daily. (Patient taking differently: Take 2 mg by mouth every morning. )    carvedilol (COREG) 25 MG tablet TAKE 1/2 TABLET BY MOUTH TWICE A DAY WITH MEALS    cholecalciferol, vitamin D3, (VITAMIN D3) 1,000 unit capsule Take 1,000 Units by mouth once daily.    HYDROcodone-acetaminophen (NORCO) 5-325 mg per tablet One or 2 every 4-6 hours as needed for pain    insulin glargine (LANTUS) 100 unit/mL injection 50 units bid prn when BS< 150    IRON/VITAMIN B COMPLEX (GERITOL ORAL) Take by mouth.    lisinopril (PRINIVIL,ZESTRIL) 5 MG tablet TAKE 1 TABLET (5 MG TOTAL) BY MOUTH ONCE DAILY. (Patient taking differently: Take 5 mg by mouth every morning. )    pantoprazole (PROTONIX) 40 MG tablet TAKE 1 TABLET BY MOUTH EVERY DAY FOR ACID REFLUX    pen needle, diabetic (BD ULTRA-FINE JOSLYN PEN NEEDLE) 32 gauge x 5/32" Ndle 1 each by Misc.(Non-Drug; Combo Route) route 4 (four) times daily.    psyllium husk, aspartame, (METAMUCIL) 3.4 gram PwPk packet Take 1 packet by mouth once daily.    lancets Misc For tel care    simvastatin (ZOCOR) 10 MG tablet " TAKE 1 TABLET (10 MG TOTAL) BY MOUTH EVERY EVENING.       Review of patient's allergies indicates:  No Known Allergies    Past Medical History:   Diagnosis Date    Arthritis     CAD (coronary artery disease)     Cataract     CHF (congestive heart failure)     Dr Calvillo    Chronic combined systolic and diastolic congestive heart failure 3/24/2015    CKD (chronic kidney disease), stage III     Diabetes mellitus type II      AM    Diabetic retinopathy     anti Veg F injections for macular edema    Diverticulitis of colon     ED (erectile dysfunction)     Glaucoma     HTN (hypertension)     Hyperlipidemia     Ischemic cardiomyopathy     Obesity     JAHAIRA on CPAP     Pacemaker     Pulmonary HTN      Past Surgical History:   Procedure Laterality Date    CARDIAC CATHETERIZATION  2009    CHOLECYSTECTOMY      COLECTOMY-SIGMOID N/A 4/22/2016    [facility] at [facility]    COLONOSCOPY N/A 10/11/2018    Procedure: COLONOSCOPY;  Surgeon: Quinn Aragon MD;  Location: [facility] ENDO;  Service: General;  Laterality: N/A;    COLONOSCOPY N/A 10/11/2018    [facility] at [facility] ENDO    COLOSTOMY N/A 4/22/2016    [facility]    CREATION, ILEOSTOMY N/A 10/30/2018    [facility]    EYE SURGERY      HEMICOLECTOMY, RIGHT Right 10/30/2018    [facility]    ILEOSTOMY N/A 10/30/2018    Procedure: CREATION, ILEOSTOMY;  Surgeon: Kevin Lindsay MD;  Location: [facility] OR;  Service: General;  Laterality: N/A;    KNEE SURGERY      MOBILIZATION OF SPLENIC FLEXURE N/A 10/30/2018    Procedure: MOBILIZATION, SPLENIC FLEXURE;  Surgeon: Kevin Lindsay MD;  Location: [facility] OR;  Service: General;  Laterality: N/A;    MOBILIZATION, SPLENIC FLEXURE N/A 10/30/2018    [facility] OR    REVISION COLOSTOMY N/A 10/30/2018    Procedure: REVISION OR CLOSURE,  COLOSTOMY;  Surgeon: Kevin Lindsay MD;  [facility];  Service: General;  Laterality: N/A;  Parastomal Hernia Repair    REVISION OR CLOSURE, COLOSTOMY N/A 10/30/2018    [facility]    RIGHT HEMICOLECTOMY Right 10/30/2018    Procedure: HEMICOLECTOMY, RIGHT;  Surgeon: Kevin Lindsay MD;  Location: [facility] OR;  Service: General;  Laterality: Right;     Family History     Problem Relation (Age of Onset)    Cancer Mother    Heart disease Father    No Known Problems Sister, Brother, Maternal Aunt, Maternal Uncle, Paternal Aunt, Paternal Uncle, Maternal Grandmother, Maternal Grandfather, Paternal Grandmother, Paternal Grandfather    Stroke Father        Tobacco Use    Smoking status: Never Smoker    Smokeless tobacco: Never Used   Substance and Sexual Activity    Alcohol use: Yes     Alcohol/week: 0.0 oz     Comment: NO ALCOHOL 72 HOURS PRIOR TO SX    Drug use: No    Sexual activity: Not Currently     Review of Systems   Constitutional: Negative.    HENT: Negative.    Eyes: Negative.    Respiratory: Negative.    Cardiovascular: Negative.    Gastrointestinal: Negative.         Drain in the lower abdomen ileostomy, old colostomy site   Endocrine: Negative.    Genitourinary: Negative.    Musculoskeletal: Negative.    Skin: Negative.    Allergic/Immunologic: Negative.    Neurological: Negative.    Hematological: Negative.    Psychiatric/Behavioral: Negative.      Objective:     Vital Signs (Most Recent):  Temp: 98.2 °F (36.8 °C) (12/12/18 0835)  Pulse: 104 (12/12/18 0835)  Resp: 18 (12/12/18 0835)  BP: 121/64 (12/12/18 0835)  SpO2: 100 % (12/12/18 0835) Vital Signs (24h Range):  Temp:  [97.4 °F (36.3 °C)-98.2 °F (36.8 °C)] 98.2 °F (36.8 °C)  Pulse:  [] 104  Resp:  [18-20] 18  SpO2:  [96 %-100 %] 100 %  BP: ()/(61-69) 121/64     Weight: 78.2 kg (172 lb 6.4 oz)  Body mass index is 26.21 kg/m².    Physical Exam   Constitutional: He is oriented to person, place, and  time.   Chronically ill   HENT:   Head: Normocephalic.   Neck: Normal range of motion. Neck supple.   Cardiovascular: Regular rhythm and normal heart sounds.   Pulmonary/Chest: Effort normal.   Abdominal: Soft. Bowel sounds are normal. He exhibits no distension. There is no tenderness. No hernia.   There is a drain in the lower abdomen with serosanguineous output.  There is a old colostomy site with granulation tissue.  There is a well-formed right upper quadrant loop ileostomy   Musculoskeletal: Normal range of motion.   Neurological: He is oriented to person, place, and time.   Psychiatric: He has a normal mood and affect. His behavior is normal. Judgment and thought content normal.   Nursing note and vitals reviewed.      Significant Labs:  CBC:   Recent Labs   Lab 12/10/18  1517   WBC 12.80*   RBC 4.69   HGB 12.7*   HCT 39.4*      MCV 84   MCH 27.1   MCHC 32.2     BMP:   Recent Labs   Lab 12/12/18  0459   *   *   K 4.8   CL 98   CO2 21*   BUN 61*   CREATININE 4.0*   CALCIUM 9.4     CMP:   Recent Labs   Lab 12/10/18  1517 12/12/18  0459   * 144*   CALCIUM 9.6 9.4   ALBUMIN 3.4*  --    PROT 8.5*  --    * 129*   K 5.2* 4.8   CO2 26 21*   CL 91* 98   BUN 60* 61*   CREATININE 4.6* 4.0*   ALKPHOS 74  --    ALT 16  --    AST 42*  --    BILITOT 0.9  --        Significant Diagnostics:  None

## 2018-12-12 NOTE — CONSULTS
12/12/18 1015       Ileostomy 10/30/18 Loop RUQ   Placement Date: 10/30/18   Present Prior to Hospital Arrival?: Yes  Inserted by: MD  Ileostomy Type: Loop  Location: RUQ   Stoma Appearance other (see comments)  (pouch intact, unable to assess at this time)   Appliance 2-piece   Stoma Function stool;flatus;brown;thin liquid;other (see comments)  (with clumps of nondigested food)   Peristomal Assessment Other (Comment)  (unable to assess due to intact appliance)   Output (mL) 150 mL   Skin   Skin WDL ex   Skin Temperature warm   Skin Moisture dry   Trip Risk Assessment   Sensory Perception 3-->slightly limited   Moisture 3-->occasionally moist   Activity 3-->walks occasionally   Mobility 3-->slightly limited   Nutrition 3-->adequate   Friction and Shear 3-->no apparent problem   Trip Score 18       Wound 12/12/18 Other (comment) upper Abdomen   Date First Assessed: 12/12/18   Pre-existing: Yes  Primary Wound Type: (c) Other (comment)  Side: Left  Orientation: upper  Location: Abdomen   Wound WDL ex   Dressing Appearance Intact;Moist drainage   Drainage Amount Scant   Drainage Characteristics/Odor Serous   Appearance Red;Granulating;Moist   Tissue loss description Full thickness   Red (%), Wound Tissue Color 100 %   Periwound Area Dry;Intact   Wound Edges Open   Wound Length (cm) 1 cm   Wound Width (cm) 4 cm   Wound Depth (cm) 0.2 cm   Wound Volume (cm^3) 0.8 cm^3   Wound Surface Area (cm^2) 4 cm^2   Care Cleansed with:;Sterile normal saline;Applied:;Skin Barrier   Dressing Hydrofiber;Foam;Changed   Dressing Change Due 12/14/18   Skin Interventions   Pressure Reduction Devices pressure-redistributing mattress utilized   Pressure Reduction Techniques frequent weight shift encouraged   Skin Protection adhesive use limited;incontinence pads utilized     Consulted on this 64 y/o M patient admitted with Ileostomy and dehydration. Patient is well know to me.  He has PMH significant for colostomy with reversal,  "Ileostomy, DM. He denies pain at present. He has been recently treated with wound vac to LUQ old colostomy site. Wound vac was removed Monday in MD office and bandaid placed. Wound measures 1x4x0.2cm with moist beefy red granulation tissue covering wound bed and small amout serous drainge. Cleansed with saline and patted dry.  Applied Aquacel foam dressing to cover. RUQ Ilesostomy noted with pouch intact..  Thin brown liquid stool noted with undigested food. 2 piece appliance. Patient denies leakage, however, he states he has been changing pouch daily at home.  On last admission, he had stage 3 pressure injuries to sweta stomal skin under starr, that were healing well at that time.  Discussed pouching with patient and made recommendation to only change pouch every 3-4 days unless it is leaking, and he is agreeable. Pouch applied yesterday evening per his report.  Emptied pouch at present.  Extra 2 piece pouches left at bedside. Discussed output and hydration status with patient. He reports drinking 3 - 32 oz cups of water daily since last discharge.  He and wife state they are still monitoring Ileostomy output, and he had been dumping approximately 1L (32 oz) of mushy stool daily, however, report decline in output over last week, approximately 18 oz per day and stool has been more liquid.  He reports taking metamucil, but ran out of lomotil.  Discussed with patient need to substitute some of his water intact with electrolyte replacing fluids such as powerade zero. He does report that fluid intact has been lower the past several days due to "feeling unwell".  Will follow    Please review Stevo's procedure "Pouching : Colostomy or Ileostomy" for instructions on ostomy.stoma care. Change ostomy appliance weekly or IMMEDIATELY IF LEAKING. All ostomy care supplies can be requested from materials management.    OSTOMY CARE SUPPLIES:  Two Piece Pouch #[id number]  Brava Stoma Ring #[id number]    Convex Wafer #[id number]  Cavilon Spray " #[id number]

## 2018-12-12 NOTE — HPI
Patient underwent a colostomy reversal.  He is found to have a leak at the anastomosis.  A drain was placed.  At a protective ileostomy was performed.  Patient has had issues with ileostomy output and dehydration.  He was seen in the office and labs showed an increase in his BUN from 20-60 over the last 12 days.  The patient's drain is now putting out serosanguineous output.  He has been tolerating a regular diet.    Says his colostomy output has been semi solid, he is out of his Lomotil.

## 2018-12-12 NOTE — PROGRESS NOTES
Patient is well known to me. Agree with IVF. Records reviewed.  Hold Ace inhibitor and diuretics for now.  Check urine for UA with micro and urine sodium and creatinine re-evaluate in the morning        Full consult to follow in am     Jd Damico MD

## 2018-12-12 NOTE — PLAN OF CARE
CM met with patient and wife at bedside to assess discharge needs. Patient lives at home with wife and is independent with needs. Patient ambulates safely independently and denies any recent falls. Patient has [facility] in place and plans to resume upon discharge. CM assessment and MOON completed, placed in chart, and copy given to patient. Updated patient white board with current d/c plan and CM contact information. Encouraged patient to contact CM if any questions arise.    [facility]  Patient agrees with plan. Patient would like to resume [facility]. Choice form obtained and referral submitted in [facility].     Transportation home at d/c: Family    Contact:  Jessica Choudhury, spouse  Phone: [phone]    PCP:  Sandra Estevez MD     Primary Payor: Medicare A&B   Secondary Payor:  [employer] [place]       12/12/18 8719   Discharge Assessment   Assessment Type Discharge Planning Assessment   Confirmed/corrected address and phone number on facesheet? Yes   Assessment information obtained from? Patient   Communicated expected length of stay with patient/caregiver yes   Prior to hospitilization cognitive status: Alert/Oriented   Prior to hospitalization functional status: Independent   Current cognitive status: Alert/Oriented   Current Functional Status: Independent   Lives With spouse   Able to Return to Prior Arrangements yes   Is patient able to care for self after discharge? Yes   Patient's perception of discharge disposition home health   Readmission Within the Last 30 Days no previous admission in last 30 days   Patient currently being followed by outpatient case management? No   Patient currently receives any other outside agency services? Yes   Name and contact number of agency or person providing outside services ([facility])   Is it the patient/care giver preference to resume care with the current outside agency? Yes   Equipment Currently Used at Home cane, straight;shower chair   Do you have any problems  affording any of your prescribed medications? No   Is the patient taking medications as prescribed? yes   Does the patient have transportation home? Yes   Transportation Anticipated family or friend will provide   Does the patient receive services at the [facility]? No   Discharge Plan A Home Health   Patient/Family in Agreement with Plan yes

## 2018-12-12 NOTE — HOSPITAL COURSE
Patient was admitted to the hospital and started on IV fluids.  His labs today show an elevation of BUN and creatinine.  Will ask Nephrology to see him to determine if he needs any changes in his Bumex specifically.  We will gently rehydrate him due to his congestive heart failure    BUN/CR improving, ileostomy output 1000 ml    Patient offers no complaints.  Ileostomy 1600 mL.  BUN and creatinine are slowly improving.    12/15/2018: Well hydrated, and is drinking adequte amount. BUN29/Cr 1.6  Discharge home tomorrow.

## 2018-12-12 NOTE — PLAN OF CARE
"Problem: Adult Inpatient Plan of Care  Goal: Plan of Care Review  Outcome: Ongoing (interventions implemented as appropriate)  Reviewed POC,  Including indications and possible side effects of administer medications. Pt. Verbalized understanding and teach back.  Remain free from injury. Bed low and lock, SRx2 up and call bell within reach. Patient arrive to room 541 via stretcher from ER patient transfer self to bed with minium assistance, Alert and Awake he was able to participated in admissions. Spouse at bedside. Patient has ostomy on right side with liquid stools. Also, KINDRA drain present midline, and old ostomy site on left abdomen wall. The left site was covered with bandaide and was cleanse with normal saline and bacitracin applied as ordered and new bandnaide apply. Patient very cooperative.  Pt. Stated" I have been a diabetic for over 35 years.     Chart Check complete.        "

## 2018-12-12 NOTE — INTERVAL H&P NOTE
The patient has been examined and the H&P has been reviewed:    I concur with the findings and changes have been noted since the H&P was written: Sodium 128, bun 60, Cr 5.8    Admit for hydration    There are no hospital problems to display for this patient.

## 2018-12-12 NOTE — H&P
[facility] -   General Surgery  History & Physical    Patient Name: Yan Choudhury  MRN: [id number]  Admission Date: 12/11/2018  Attending Physician: Kevin Lindsay MD   Primary Care Provider: Sandra Estevez MD    Patient information was obtained from patient, past medical records and ER records.     Subjective:     Chief Complaint/Reason for Admission:  Elevated BUN and creatinine/acute on chronic renal failure    History of Present Illness: Patient underwent a colostomy reversal.  He is found to have a leak at the anastomosis.  A drain was placed.  At a protective ileostomy was performed.  Patient has had issues with ileostomy output and dehydration.  He was seen in the office and labs showed an increase in his BUN from 20-60 over the last 12 days.  The patient's drain is now putting out serosanguineous output.  He has been tolerating a regular diet.    Says his colostomy output has been semi solid, he is out of his Lomotil.    No current facility-administered medications on file prior to encounter.      Current Outpatient Medications on File Prior to Encounter   Medication Sig    allopurinol (ZYLOPRIM) 100 MG tablet TAKE 1 TABLET (100 MG TOTAL) BY MOUTH ONCE DAILY. (Patient taking differently: TAKE 1 TABLET (100 MG TOTAL) BY MOUTH ONCE DAILY./ takes in AM)    aspirin (ECOTRIN) 81 MG EC tablet Take 81 mg by mouth every morning.     BD INSULIN SYRINGE ULTRA-FINE 0.5 mL 31 gauge x 5/16 Syrg USE AS DIRECTED TO INJECT INSULIN TWO TIMES DAILY    bimatoprost (LUMIGAN) 0.03 % ophthalmic drops Place 1 drop into the left eye every evening. (Patient taking differently: Place 1 drop into the right eye every evening. )    blood sugar diagnostic Strp 1 strip by Misc.(Non-Drug; Combo Route) route 4 (four) times daily. Telcare    bumetanide (BUMEX) 2 MG tablet Take 1 tablet (2 mg total) by mouth 2 (two) times daily. (Patient taking differently: Take 2 mg by mouth every morning. )    carvedilol (COREG) 25 MG  "tablet TAKE 1/2 TABLET BY MOUTH TWICE A DAY WITH MEALS    cholecalciferol, vitamin D3, (VITAMIN D3) 1,000 unit capsule Take 1,000 Units by mouth once daily.    HYDROcodone-acetaminophen (NORCO) 5-325 mg per tablet One or 2 every 4-6 hours as needed for pain    insulin glargine (LANTUS) 100 unit/mL injection 50 units bid prn when BS< 150    IRON/VITAMIN B COMPLEX (GERITOL ORAL) Take by mouth.    lisinopril (PRINIVIL,ZESTRIL) 5 MG tablet TAKE 1 TABLET (5 MG TOTAL) BY MOUTH ONCE DAILY. (Patient taking differently: Take 5 mg by mouth every morning. )    pantoprazole (PROTONIX) 40 MG tablet TAKE 1 TABLET BY MOUTH EVERY DAY FOR ACID REFLUX    pen needle, diabetic (BD ULTRA-FINE JOSLYN PEN NEEDLE) 32 gauge x 5/32" Ndle 1 each by Misc.(Non-Drug; Combo Route) route 4 (four) times daily.    psyllium husk, aspartame, (METAMUCIL) 3.4 gram PwPk packet Take 1 packet by mouth once daily.    lancets Misc For tel care    simvastatin (ZOCOR) 10 MG tablet TAKE 1 TABLET (10 MG TOTAL) BY MOUTH EVERY EVENING.       Review of patient's allergies indicates:  No Known Allergies    Past Medical History:   Diagnosis Date    Arthritis     CAD (coronary artery disease)     Cataract     CHF (congestive heart failure)     Dr Calvillo    Chronic combined systolic and diastolic congestive heart failure 3/24/2015    CKD (chronic kidney disease), stage III     Diabetes mellitus type II      AM    Diabetic retinopathy     anti Veg F injections for macular edema    Diverticulitis of colon     ED (erectile dysfunction)     Glaucoma     HTN (hypertension)     Hyperlipidemia     Ischemic cardiomyopathy     Obesity     JAHAIRA on CPAP     Pacemaker     Pulmonary HTN      Past Surgical History:   Procedure Laterality Date    CARDIAC CATHETERIZATION  2009    CHOLECYSTECTOMY      COLECTOMY-SIGMOID N/A 4/22/2016    [facility]    COLONOSCOPY N/A 10/11/2018    Procedure: COLONOSCOPY;  Surgeon: " Quinn Aragon MD;  Location: [facility];  Service: General;  Laterality: N/A;    COLONOSCOPY N/A 10/11/2018    Performed by Quinn Aragon MD at [facility]    COLOSTOMY N/A 4/22/2016    Per[facility] by Kevin Lindsay [facility] at [facility] OR    CREATION, ILEOSTOMY N/A 10/30/2018    Performed by Kevin [facility] at [facility] OR    EYE SURGERY      HEMICOLECTOMY, RIGHT Right 10/30/2018    Performed by Kevin Lindsay MD at [facility] OR    ILEOSTOMY N/A 10/30/2018    Procedure: CREATION, ILEOSTOMY;  Surgeon: Kevin Lindsay MD;  Location: [facility] OR;  Service: General;  Laterality: N/A;    KNEE SURGERY      MOBILIZATION OF SPLENIC FLEXURE N/A 10/30/2018    Procedure: MOBILIZATION, SPLENIC FLEXURE;  Surgeon: Kevin Lindsay MD;  Location: [facility] OR;  Service: General;  Laterality: N/A;    MOBILIZATION, SPLENIC FLEXURE N/A 10/30/2018    Performed by Kevin Lindsay MD at [facility] OR    REVISION COLOSTOMY N/A 10/30/2018    Procedure: REVISION OR CLOSURE, COLOSTOMY;  Surgeon: Kevin Lindsay MD;  Location: [facility] OR;  Service: General;  Laterality: N/A;  Parastomal Hernia Repair    REVISION OR CLOSURE, COLOSTOMY N/A 10/30/2018    Performed by Kevin Lindsay [facility] at [facility] OR    RIGHT HEMICOLECTOMY Right 10/30/2018    Procedure: HEMICOLECTOMY, RIGHT;  Surgeon: Kevin Lindsay MD;  Location: [facility] OR;  Service: General;  Laterality: Right;     Family History     Problem Relation (Age of Onset)    Cancer Mother    Heart disease Father    No Known Problems Sister, Brother, Maternal Aunt, Maternal Uncle, Paternal Aunt, Paternal Uncle, Maternal Grandmother, Maternal Grandfather, Paternal Grandmother, Paternal Grandfather    Stroke Father        Tobacco Use    Smoking status: Never Smoker    Smokeless tobacco: Never Used   Substance and Sexual Activity    Alcohol use: Yes     Alcohol/week: 0.0 oz     Comment: NO ALCOHOL 72 HOURS PRIOR TO SX    Drug use: No    Sexual activity: Not Currently     Review of Systems    Constitutional: Negative.    HENT: Negative.    Eyes: Negative.    Respiratory: Negative.    Cardiovascular: Negative.    Gastrointestinal: Negative.         Drain in the lower abdomen ileostomy, old colostomy site   Endocrine: Negative.    Genitourinary: Negative.    Musculoskeletal: Negative.    Skin: Negative.    Allergic/Immunologic: Negative.    Neurological: Negative.    Hematological: Negative.    Psychiatric/Behavioral: Negative.      Objective:     Vital Signs (Most Recent):  Temp: 98.2 °F (36.8 °C) (12/12/18 0835)  Pulse: 104 (12/12/18 0835)  Resp: 18 (12/12/18 0835)  BP: 121/64 (12/12/18 0835)  SpO2: 100 % (12/12/18 0835) Vital Signs (24h Range):  Temp:  [97.4 °F (36.3 °C)-98.2 °F (36.8 °C)] 98.2 °F (36.8 °C)  Pulse:  [] 104  Resp:  [18-20] 18  SpO2:  [96 %-100 %] 100 %  BP: ()/(61-69) 121/64     Weight: 78.2 kg (172 lb 6.4 oz)  Body mass index is 26.21 kg/m².    Physical Exam   Constitutional: He is oriented to person, place, and time.   Chronically ill   HENT:   Head: Normocephalic.   Neck: Normal range of motion. Neck supple.   Cardiovascular: Regular rhythm and normal heart sounds.   Pulmonary/Chest: Effort normal.   Abdominal: Soft. Bowel sounds are normal. He exhibits no distension. There is no tenderness. No hernia.   There is a drain in the lower abdomen with serosanguineous output.  There is a old colostomy site with granulation tissue.  There is a well-formed right upper quadrant loop ileostomy   Musculoskeletal: Normal range of motion.   Neurological: He is oriented to person, place, and time.   Psychiatric: He has a normal mood and affect. His behavior is normal. Judgment and thought content normal.   Nursing note and vitals reviewed.      Significant Labs:  CBC:   Recent Labs   Lab 12/10/18  1517   WBC 12.80*   RBC 4.69   HGB 12.7*   HCT 39.4*      MCV 84   MCH 27.1   MCHC 32.2     BMP:   Recent Labs   Lab 12/12/18  0459   *   *   K 4.8   CL 98   CO2 21*    BUN 61*   CREATININE 4.0*   CALCIUM 9.4     CMP:   Recent Labs   Lab 12/10/18  1517 12/12/18  0459   * 144*   CALCIUM 9.6 9.4   ALBUMIN 3.4*  --    PROT 8.5*  --    * 129*   K 5.2* 4.8   CO2 26 21*   CL 91* 98   BUN 60* 61*   CREATININE 4.6* 4.0*   ALKPHOS 74  --    ALT 16  --    AST 42*  --    BILITOT 0.9  --        Significant Diagnostics:  None    Assessment/Plan:     * Complication of surgical procedure    Anastomotic leak, leave drain in place, flushed drain daily     Gastrointestinal anastomotic leak    Continue drain.  He will need a Gastrografin enema in size 4-6 weeks     Acute on chronic renal failure    Monitor, Nephrology to see to clarify the degree of the a chronic renal failure     Ileostomy in place    Lomotil and Metamucil.  Will ask wound care to see for ostomy assistant     Chronic combined systolic and diastolic congestive heart failure    Monitor, will add telemetry     Ischemic cardiomyopathy    Monitor       VTE Risk Mitigation (From admission, onward)        Ordered     enoxaparin injection 30 mg  Daily      12/11/18 1838     Place sequential compression device  Until discontinued      12/11/18 1838     IP VTE HIGH RISK PATIENT  Once      12/11/18 1838          Kevin Lindsay MD  General Surgery  [facility] -

## 2018-12-12 NOTE — HPI
Patient is a 63-year-old male with history of acute kidney injury in November of 2018 and was seen and managed by myself.  His acute kidney injury responded very well with IV fluids.  He was seen in the outpatient clinic by Dr. Berg and his creatinine had improved.  Patient is now admitted back with recurrent acute kidney injury and hyponatremia due to dehydration.  Patient's has good sleep and appetite no fevers and chills.  No evidence of sepsis.  No evidence of infection.  Patient has no symptoms of CHF either.  Patient has been started on normal saline.  We stopped his ACE-inhibitor and diuretics.  His creatinine on admission was 4.6 which has improved down to 4.0 today.  We will continue IV fluids today.  Rest of his HPI as copied from the previous consultation in November of 2018 is as follows:    Patient is a 63-year-old male with history of chronic kidney disease followed by our division in the outpatient clinic.  Patient has been seen by Dr. Seth and Dr. Juan Luis Galeas in the past.  Patient has had history of diverticular perforation requiring colostomy.  Patient is status post colostomy reversal as well as repair of paraostomy hernia repair.  Patient was recently hospitalized with dehydration.  Patient also has history of congestive heart failure followed by Dr. Calvillo as an outpatient.  Patient takes metolazone as an outpatient for diuretics.  Patient was seen by Dr. Lindsay as an outpatient today and was admitted for dehydration.  Patient's baseline creatinine ranged between 1.8 and 2.1.  Patient never had any proteinuria.  His creatinine has gone up to 8.2 with a BUN elevation as well as hyponatremia.  Nephrology consultation is requested for acute kidney injury on chronic kidney disease stage 3 4/stage IV.

## 2018-12-12 NOTE — ASSESSMENT & PLAN NOTE
Acute kidney injury most likely due to dehydration.  Agree with IV fluids.  Continue with normal saline today.  Hold Ace inhibitor and diuretics for now.  No evidence of CHF by exam.  Urine sodium is less than 20.  Fractional excretion of sodium is less than 1%.  Patient seems to be behaving like previous admission of severe dehydration.

## 2018-12-13 LAB
ALBUMIN SERPL BCP-MCNC: 2.6 G/DL
ANION GAP SERPL CALC-SCNC: 5 MMOL/L
ANION GAP SERPL CALC-SCNC: 5 MMOL/L
BUN SERPL-MCNC: 50 MG/DL
BUN SERPL-MCNC: 50 MG/DL
CALCIUM SERPL-MCNC: 8.8 MG/DL
CALCIUM SERPL-MCNC: 8.8 MG/DL
CHLORIDE SERPL-SCNC: 105 MMOL/L
CHLORIDE SERPL-SCNC: 106 MMOL/L
CO2 SERPL-SCNC: 20 MMOL/L
CO2 SERPL-SCNC: 21 MMOL/L
CREAT SERPL-MCNC: 2.8 MG/DL
CREAT SERPL-MCNC: 2.8 MG/DL
EST. GFR  (AFRICAN AMERICAN): 26 ML/MIN/1.73 M^2
EST. GFR  (AFRICAN AMERICAN): 26 ML/MIN/1.73 M^2
EST. GFR  (NON AFRICAN AMERICAN): 23 ML/MIN/1.73 M^2
EST. GFR  (NON AFRICAN AMERICAN): 23 ML/MIN/1.73 M^2
GLUCOSE SERPL-MCNC: 97 MG/DL
GLUCOSE SERPL-MCNC: 98 MG/DL
PHOSPHATE SERPL-MCNC: 3.5 MG/DL
POCT GLUCOSE: 115 MG/DL (ref 70–110)
POCT GLUCOSE: 154 MG/DL (ref 70–110)
POCT GLUCOSE: 86 MG/DL (ref 70–110)
POTASSIUM SERPL-SCNC: 4.7 MMOL/L
POTASSIUM SERPL-SCNC: 4.7 MMOL/L
SODIUM SERPL-SCNC: 131 MMOL/L
SODIUM SERPL-SCNC: 131 MMOL/L

## 2018-12-13 PROCEDURE — 25000003 PHARM REV CODE 250: Performed by: SURGERY

## 2018-12-13 PROCEDURE — S5571 INSULIN DISPOS PEN 3 ML: HCPCS | Performed by: SURGERY

## 2018-12-13 PROCEDURE — 11000001 HC ACUTE MED/SURG PRIVATE ROOM

## 2018-12-13 PROCEDURE — 80048 BASIC METABOLIC PNL TOTAL CA: CPT

## 2018-12-13 PROCEDURE — 63600175 PHARM REV CODE 636 W HCPCS: Performed by: SURGERY

## 2018-12-13 PROCEDURE — 99232 PR SUBSEQUENT HOSPITAL CARE,LEVL II: ICD-10-PCS | Mod: ,,, | Performed by: INTERNAL MEDICINE

## 2018-12-13 PROCEDURE — 80069 RENAL FUNCTION PANEL: CPT

## 2018-12-13 PROCEDURE — 99232 SBSQ HOSP IP/OBS MODERATE 35: CPT | Mod: ,,, | Performed by: INTERNAL MEDICINE

## 2018-12-13 PROCEDURE — 36415 COLL VENOUS BLD VENIPUNCTURE: CPT

## 2018-12-13 RX ORDER — SODIUM CHLORIDE 9 MG/ML
1000 INJECTION, SOLUTION INTRAVENOUS CONTINUOUS
Status: ACTIVE | OUTPATIENT
Start: 2018-12-13 | End: 2018-12-15

## 2018-12-13 RX ADMIN — DIPHENOXYLATE HYDROCHLORIDE AND ATROPINE SULFATE 1 TABLET: 2.5; .025 TABLET ORAL at 09:12

## 2018-12-13 RX ADMIN — DIPHENOXYLATE HYDROCHLORIDE AND ATROPINE SULFATE 1 TABLET: 2.5; .025 TABLET ORAL at 10:12

## 2018-12-13 RX ADMIN — CARVEDILOL 25 MG: 12.5 TABLET, FILM COATED ORAL at 09:12

## 2018-12-13 RX ADMIN — PANTOPRAZOLE SODIUM 40 MG: 40 TABLET, DELAYED RELEASE ORAL at 09:12

## 2018-12-13 RX ADMIN — INSULIN DETEMIR 15 UNITS: 100 INJECTION, SOLUTION SUBCUTANEOUS at 09:12

## 2018-12-13 RX ADMIN — ASPIRIN 81 MG: 81 TABLET, COATED ORAL at 06:12

## 2018-12-13 RX ADMIN — SIMVASTATIN 10 MG: 5 TABLET, FILM COATED ORAL at 10:12

## 2018-12-13 RX ADMIN — DIPHENOXYLATE HYDROCHLORIDE AND ATROPINE SULFATE 1 TABLET: 2.5; .025 TABLET ORAL at 01:12

## 2018-12-13 RX ADMIN — BACITRACIN ZINC NEOMYCIN SULFATE POLYMYXIN B SULFATE: 400; 3.5; 5 OINTMENT TOPICAL at 10:12

## 2018-12-13 RX ADMIN — DIPHENOXYLATE HYDROCHLORIDE AND ATROPINE SULFATE 1 TABLET: 2.5; .025 TABLET ORAL at 05:12

## 2018-12-13 RX ADMIN — BACITRACIN ZINC NEOMYCIN SULFATE POLYMYXIN B SULFATE: 400; 3.5; 5 OINTMENT TOPICAL at 09:12

## 2018-12-13 RX ADMIN — ENOXAPARIN SODIUM 30 MG: 100 INJECTION SUBCUTANEOUS at 05:12

## 2018-12-13 RX ADMIN — BIMATOPROST 1 DROP: 0.1 SOLUTION/ DROPS OPHTHALMIC at 10:12

## 2018-12-13 RX ADMIN — ALLOPURINOL 100 MG: 100 TABLET ORAL at 09:12

## 2018-12-13 RX ADMIN — SODIUM CHLORIDE 1000 ML: 0.9 INJECTION, SOLUTION INTRAVENOUS at 11:12

## 2018-12-13 NOTE — SUBJECTIVE & OBJECTIVE
Interval History: bun and Cr improving    Medications:  Continuous Infusions:   sodium chloride 0.9% 1,000 mL (12/12/18 1741)     Scheduled Meds:   allopurinol  100 mg Oral Daily    aspirin  81 mg Oral QAM    bimatoprost  1 drop Right Eye QHS    carvedilol  25 mg Oral Daily    diphenoxylate-atropine 2.5-0.025 mg  1 tablet Oral QID    enoxaparin  30 mg Subcutaneous Daily    insulin detemir U-100  15 Units Subcutaneous Daily    neomycin-bacitracin-polymyxin   Topical (Top) BID    pantoprazole  40 mg Oral Daily    simvastatin  10 mg Oral QHS     PRN Meds:acetaminophen, dextrose 50%, dextrose 50%, diphenhydrAMINE, glucagon (human recombinant), glucose, glucose, HYDROcodone-acetaminophen, insulin aspart U-100, ondansetron, ramelteon, sodium chloride 0.9%     Review of patient's allergies indicates:  No Known Allergies  Objective:     Vital Signs (Most Recent):  Temp: 98.4 °F (36.9 °C) (12/13/18 0753)  Pulse: 92 (12/13/18 0753)  Resp: (!) 21 (12/13/18 0753)  BP: 100/68 (12/13/18 0753)  SpO2: 100 % (12/13/18 0753) Vital Signs (24h Range):  Temp:  [97 °F (36.1 °C)-98.4 °F (36.9 °C)] 98.4 °F (36.9 °C)  Pulse:  [] 92  Resp:  [16-21] 21  SpO2:  [98 %-100 %] 100 %  BP: ()/(60-69) 100/68     Weight: 77.7 kg (171 lb 6.4 oz)  Body mass index is 26.06 kg/m².    Intake/Output - Last 3 Shifts       12/11 0700 - 12/12 0659 12/12 0700 - 12/13 0659 12/13 0700 - 12/14 0659    P.O. 120 2080 360    I.V. (mL/kg)  2456.3 (31.6)     Total Intake(mL/kg) 120 (1.5) 4536.3 (58.4) 360 (4.6)    Urine (mL/kg/hr) 200 1200 (0.6) 100 (0.6)    Other  50     Stool  1100     Total Output 200 2350 100    Net -80 +2186.3 +260                 Physical Exam   Constitutional: He is oriented to person, place, and time. No distress.   Chronically ill   HENT:   Head: Normocephalic.   Neck: Normal range of motion.   Cardiovascular: Regular rhythm.   Pulmonary/Chest: Effort normal and breath sounds normal.   Abdominal: Soft. He exhibits no  distension. There is no tenderness.   Drain and ileosotomy   Neurological: He is oriented to person, place, and time.   Skin: Skin is warm and dry. Capillary refill takes less than 2 seconds.   Psychiatric: He has a normal mood and affect. His behavior is normal. Judgment and thought content normal.   Vitals reviewed.      Significant Labs:  CBC:   Recent Labs   Lab 12/10/18  1517   WBC 12.80*   RBC 4.69   HGB 12.7*   HCT 39.4*      MCV 84   MCH 27.1   MCHC 32.2     BMP:   Recent Labs   Lab 12/13/18  0548   GLU 97  98   *  131*   K 4.7  4.7     105   CO2 20*  21*   BUN 50*  50*   CREATININE 2.8*  2.8*   CALCIUM 8.8  8.8     CMP:   Recent Labs   Lab 12/10/18  1517  12/13/18  0548   *   < > 97  98   CALCIUM 9.6   < > 8.8  8.8   ALBUMIN 3.4*  --  2.6*   PROT 8.5*  --   --    *   < > 131*  131*   K 5.2*   < > 4.7  4.7   CO2 26   < > 20*  21*   CL 91*   < > 106  105   BUN 60*   < > 50*  50*   CREATININE 4.6*   < > 2.8*  2.8*   ALKPHOS 74  --   --    ALT 16  --   --    AST 42*  --   --    BILITOT 0.9  --   --     < > = values in this interval not displayed.       Significant Diagnostics:  none

## 2018-12-13 NOTE — PROGRESS NOTES
[facility] -   General Surgery  Progress Note    Subjective:     History of Present Illness:  Patient underwent a colostomy reversal.  He is found to have a leak at the anastomosis.  A drain was placed.  At a protective ileostomy was performed.  Patient has had issues with ileostomy output and dehydration.  He was seen in the office and labs showed an increase in his BUN from 20-60 over the last 12 days.  The patient's drain is now putting out serosanguineous output.  He has been tolerating a regular diet.    Says his colostomy output has been semi solid, he is out of his Lomotil.    Post-Op Info:  * No surgery found *         Interval History: bun and Cr improving    Medications:  Continuous Infusions:   sodium chloride 0.9% 1,000 mL (12/12/18 1741)     Scheduled Meds:   allopurinol  100 mg Oral Daily    aspirin  81 mg Oral QAM    bimatoprost  1 drop Right Eye QHS    carvedilol  25 mg Oral Daily    diphenoxylate-atropine 2.5-0.025 mg  1 tablet Oral QID    enoxaparin  30 mg Subcutaneous Daily    insulin detemir U-100  15 Units Subcutaneous Daily    neomycin-bacitracin-polymyxin   Topical (Top) BID    pantoprazole  40 mg Oral Daily    simvastatin  10 mg Oral QHS     PRN Meds:acetaminophen, dextrose 50%, dextrose 50%, diphenhydrAMINE, glucagon (human recombinant), glucose, glucose, HYDROcodone-acetaminophen, insulin aspart U-100, ondansetron, ramelteon, sodium chloride 0.9%     Review of patient's allergies indicates:  No Known Allergies  Objective:     Vital Signs (Most Recent):  Temp: 98.4 °F (36.9 °C) (12/13/18 0753)  Pulse: 92 (12/13/18 0753)  Resp: (!) 21 (12/13/18 0753)  BP: 100/68 (12/13/18 0753)  SpO2: 100 % (12/13/18 0753) Vital Signs (24h Range):  Temp:  [97 °F (36.1 °C)-98.4 °F (36.9 °C)] 98.4 °F (36.9 °C)  Pulse:  [] 92  Resp:  [16-21] 21  SpO2:  [98 %-100 %] 100 %  BP: ()/(60-69) 100/68     Weight: 77.7 kg (171 lb 6.4 oz)  Body mass index is 26.06  kg/m².    Intake/Output - Last 3 Shifts       12/11 0700 - 12/12 0659 12/12 0700 - 12/13 0659 12/13 0700 - 12/14 0659    P.O. 120 2080 360    I.V. (mL/kg)  2456.3 (31.6)     Total Intake(mL/kg) 120 (1.5) 4536.3 (58.4) 360 (4.6)    Urine (mL/kg/hr) 200 1200 (0.6) 100 (0.6)    Other  50     Stool  1100     Total Output 200 2350 100    Net -80 +2186.3 +260                 Physical Exam   Constitutional: He is oriented to person, place, and time. No distress.   Chronically ill   HENT:   Head: Normocephalic.   Neck: Normal range of motion.   Cardiovascular: Regular rhythm.   Pulmonary/Chest: Effort normal and breath sounds normal.   Abdominal: Soft. He exhibits no distension. There is no tenderness.   Drain and ileosotomy   Neurological: He is oriented to person, place, and time.   Skin: Skin is warm and dry. Capillary refill takes less than 2 seconds.   Psychiatric: He has a normal mood and affect. His behavior is normal. Judgment and thought content normal.   Vitals reviewed.      Significant Labs:  CBC:   Recent Labs   Lab 12/10/18  1517   WBC 12.80*   RBC 4.69   HGB 12.7*   HCT 39.4*      MCV 84   MCH 27.1   MCHC 32.2     BMP:   Recent Labs   Lab 12/13/18  0548   GLU 97  98   *  131*   K 4.7  4.7     105   CO2 20*  21*   BUN 50*  50*   CREATININE 2.8*  2.8*   CALCIUM 8.8  8.8     CMP:   Recent Labs   Lab 12/10/18  1517  12/13/18  0548   *   < > 97  98   CALCIUM 9.6   < > 8.8  8.8   ALBUMIN 3.4*  --  2.6*   PROT 8.5*  --   --    *   < > 131*  131*   K 5.2*   < > 4.7  4.7   CO2 26   < > 20*  21*   CL 91*   < > 106  105   BUN 60*   < > 50*  50*   CREATININE 4.6*   < > 2.8*  2.8*   ALKPHOS 74  --   --    ALT 16  --   --    AST 42*  --   --    BILITOT 0.9  --   --     < > = values in this interval not displayed.       Significant Diagnostics:  none    Assessment/Plan:     * Complication of surgical procedure    Anastomotic leak, leave drain in place, flushed drain daily      Acute renal failure    Improving, continue ivf     Gastrointestinal anastomotic leak    Continue drain.  He will need a Gastrografin enema in size 4-6 weeks     Acute on chronic renal failure    Nephology not reviewed  Continue to re hydrated     Ileostomy in place    Lomotil and Metamucil.  Will ask wound care to see for ostomy assistant     Chronic combined systolic and diastolic congestive heart failure    Monitor, will add telemetry     Ischemic cardiomyopathy    Monitor         Kevin Lindsay MD  General Surgery  [facility] [place]

## 2018-12-13 NOTE — ASSESSMENT & PLAN NOTE
Acute kidney injury much better with IV fluids.  Continue with normal saline today @ 100 cc/hour.  Hold Ace inhibitor and diuretics for now.  No evidence of CHF by exam.  Urine sodium is less than 20.  Fractional excretion of sodium is less than 1%.      Outpatient should go home on ACEi with weekly nurse visit and monthly MD visit with dr. Seth

## 2018-12-13 NOTE — PROGRESS NOTES
Follow up visit with Serenity .  His ileostomy pouch remains intact with no leakage.  Extra supplies at bedside to change prn, pouch was placed Tuesday per his report.  mucousy consistence brown liquid stool noted in pouch at present.  Dressing intact to LUQ old colostomy site and not disturbed at this time.   Provided patient with new Ileostomy output log to record while in hospital.  Also discussed again fluid intact with patient. Discussed hydration with patient.  Recommend PowerAde ZERO or Propel water flavoring packets or drops in addition to water intake daily.  Patient is to replace 1/3 of daily water intact with electrolyte rich drinks.  Encouragement provided. Will follow.

## 2018-12-13 NOTE — PLAN OF CARE
Problem: Adult Inpatient Plan of Care  Goal: Plan of Care Review  Outcome: Ongoing (interventions implemented as appropriate)  Remains free from harm, no c/o pain, positions self, no acute distress noted. 24 hour chart check complete.

## 2018-12-13 NOTE — PLAN OF CARE
Problem: Adult Inpatient Plan of Care  Goal: Plan of Care Review  Outcome: Ongoing (interventions implemented as appropriate)  Remains inury free. Denies c/o pain. Repositions per self in the bed. No s/s acute distress.

## 2018-12-13 NOTE — SUBJECTIVE & OBJECTIVE
Interval History: Elisa is better     Review of patient's allergies indicates:  No Known Allergies  Current Facility-Administered Medications   Medication Frequency    0.9%  NaCl infusion Continuous    acetaminophen tablet 650 mg Q8H PRN    allopurinol tablet 100 mg Daily    aspirin EC tablet 81 mg QAM    bimatoprost 0.01 % ophthalmic drops 1 drop QHS    carvedilol tablet 25 mg Daily    dextrose 50% injection 12.5 g PRN    dextrose 50% injection 25 g PRN    diphenhydrAMINE capsule 25 mg Q4H PRN    diphenoxylate-atropine 2.5-0.025 mg per tablet 1 tablet QID    enoxaparin injection 30 mg Daily    glucagon (human recombinant) injection 1 mg PRN    glucose chewable tablet 16 g PRN    glucose chewable tablet 24 g PRN    HYDROcodone-acetaminophen 5-325 mg per tablet 1 tablet Q6H PRN    insulin aspart U-100 pen 1-10 Units QID (AC + HS) PRN    insulin detemir U-100 pen 15 Units Daily    neomycin-bacitracin-polymyxin ointment BID    ondansetron disintegrating tablet 8 mg Q8H PRN    pantoprazole EC tablet 40 mg Daily    ramelteon tablet 8 mg Nightly PRN    simvastatin tablet 10 mg QHS    sodium chloride 0.9% flush 3 mL PRN       Objective:     Vital Signs (Most Recent):  Temp: 98.4 °F (36.9 °C) (12/13/18 0753)  Pulse: 92 (12/13/18 0753)  Resp: (!) 21 (12/13/18 0753)  BP: 100/68 (12/13/18 0753)  SpO2: 100 % (12/13/18 0753)  O2 Device (Oxygen Therapy): room air (12/13/18 0753) Vital Signs (24h Range):  Temp:  [97 °F (36.1 °C)-98.4 °F (36.9 °C)] 98.4 °F (36.9 °C)  Pulse:  [] 92  Resp:  [16-21] 21  SpO2:  [98 %-100 %] 100 %  BP: ()/(60-69) 100/68     Weight: 77.7 kg (171 lb 6.4 oz) (12/13/18 0600)  Body mass index is 26.06 kg/m².  Body surface area is 1.93 meters squared.    I/O last 3 completed shifts:  In: 4656.3 [P.O.:2200; I.V.:2456.3]  Out: 2550 [Urine:1400; Other:50; Stool:1100]    Physical Exam   Constitutional: He is oriented to person, place, and time. He appears well-developed and  well-nourished. No distress.   Chronically ill   HENT:   Head: Normocephalic.   Eyes: EOM are normal. Pupils are equal, round, and reactive to light.   Neck: Normal range of motion. Neck supple. No JVD present. No thyromegaly present.   Cardiovascular: Normal rate, regular rhythm, S1 normal, S2 normal and intact distal pulses. PMI is not displaced. Exam reveals no gallop and no friction rub.   Murmur heard.  No evidence of CHF at this time.   Pulmonary/Chest: Effort normal and breath sounds normal. No respiratory distress. He has no wheezes. He has no rales. He exhibits no tenderness.   Abdominal: Soft. Bowel sounds are normal. He exhibits no distension and no mass. There is no hepatosplenomegaly. There is no tenderness. There is no rebound and no CVA tenderness. No hernia.   There is a drain in the lower abdomen with serosanguineous output.  There is a old colostomy site with granulation tissue.  There is a well-formed right upper quadrant loop ileostomy   Musculoskeletal: Normal range of motion. He exhibits no edema or tenderness.   Lymphadenopathy:     He has no cervical adenopathy.   Neurological: He is alert and oriented to person, place, and time. He has normal reflexes. He is not disoriented. He displays normal reflexes. No cranial nerve deficit. He exhibits normal muscle tone. Coordination normal.   Skin: Skin is warm and dry. Capillary refill takes less than 2 seconds. No rash noted. No erythema.   Psychiatric: He has a normal mood and affect. His behavior is normal. Judgment and thought content normal.   Nursing note and vitals reviewed.      Significant Labs:  All labs within the past 24 hours have been reviewed.     Significant Imaging:  Labs: Reviewed

## 2018-12-13 NOTE — PROGRESS NOTES
[facility] -   Nephrology  Progress Note    Patient Name: Yan Choudhury  MRN: [id number]  Admission Date: 12/11/2018  Hospital Length of Stay: 1 days  Attending Provider: Kevin Lindsay MD   Primary Care Physician: Sandra Estevez MD  Principal Problem:Complication of surgical procedure    Subjective:     HPI: Patient is a 63-year-old male with history of acute kidney injury in November of 2018 and was seen and managed by myself.  His acute kidney injury responded very well with IV fluids.  He was seen in the outpatient clinic by Dr. Berg and his creatinine had improved.  Patient is now admitted back with recurrent acute kidney injury and hyponatremia due to dehydration.  Patient's has good sleep and appetite no fevers and chills.  No evidence of sepsis.  No evidence of infection.  Patient has no symptoms of CHF either.  Patient has been started on normal saline.  We stopped his ACE-inhibitor and diuretics.  His creatinine on admission was 4.6 which has improved down to 4.0 today.  We will continue IV fluids today.  Rest of his HPI as copied from the previous consultation in November of 2018 is as follows:    Patient is a 63-year-old male with history of chronic kidney disease followed by our division in the outpatient clinic.  Patient has been seen by Dr. Seth and Dr. Juan Luis Galeas in the past.  Patient has had history of diverticular perforation requiring colostomy.  Patient is status post colostomy reversal as well as repair of paraostomy hernia repair.  Patient was recently hospitalized with dehydration.  Patient also has history of congestive heart failure followed by Dr. Calvillo as an outpatient.  Patient takes metolazone as an outpatient for diuretics.  Patient was seen by Dr. Lindsay as an outpatient today and was admitted for dehydration.  Patient's baseline creatinine ranged between 1.8 and 2.1.  Patient never had any proteinuria.  His creatinine has gone up to 8.2 with a BUN elevation as well  as hyponatremia.  Nephrology consultation is requested for acute kidney injury on chronic kidney disease stage 3 4/stage IV.         Interval History: Elisa is better     Review of patient's allergies indicates:  No Known Allergies  Current Facility-Administered Medications   Medication Frequency    0.9%  NaCl infusion Continuous    acetaminophen tablet 650 mg Q8H PRN    allopurinol tablet 100 mg Daily    aspirin EC tablet 81 mg QAM    bimatoprost 0.01 % ophthalmic drops 1 drop QHS    carvedilol tablet 25 mg Daily    dextrose 50% injection 12.5 g PRN    dextrose 50% injection 25 g PRN    diphenhydrAMINE capsule 25 mg Q4H PRN    diphenoxylate-atropine 2.5-0.025 mg per tablet 1 tablet QID    enoxaparin injection 30 mg Daily    glucagon (human recombinant) injection 1 mg PRN    glucose chewable tablet 16 g PRN    glucose chewable tablet 24 g PRN    HYDROcodone-acetaminophen 5-325 mg per tablet 1 tablet Q6H PRN    insulin aspart U-100 pen 1-10 Units QID (AC + HS) PRN    insulin detemir U-100 pen 15 Units Daily    neomycin-bacitracin-polymyxin ointment BID    ondansetron disintegrating tablet 8 mg Q8H PRN    pantoprazole EC tablet 40 mg Daily    ramelteon tablet 8 mg Nightly PRN    simvastatin tablet 10 mg QHS    sodium chloride 0.9% flush 3 mL PRN       Objective:     Vital Signs (Most Recent):  Temp: 98.4 °F (36.9 °C) (12/13/18 0753)  Pulse: 92 (12/13/18 0753)  Resp: (!) 21 (12/13/18 0753)  BP: 100/68 (12/13/18 0753)  SpO2: 100 % (12/13/18 0753)  O2 Device (Oxygen Therapy): room air (12/13/18 0753) Vital Signs (24h Range):  Temp:  [97 °F (36.1 °C)-98.4 °F (36.9 °C)] 98.4 °F (36.9 °C)  Pulse:  [] 92  Resp:  [16-21] 21  SpO2:  [98 %-100 %] 100 %  BP: ()/(60-69) 100/68     Weight: 77.7 kg (171 lb 6.4 oz) (12/13/18 0600)  Body mass index is 26.06 kg/m².  Body surface area is 1.93 meters squared.    I/O last 3 completed shifts:  In: 4656.3 [P.O.:2200; I.V.:2456.3]  Out: 2550 [Urine:1400;  Other:50; Stool:1100]    Physical Exam   Constitutional: He is oriented to person, place, and time. He appears well-developed and well-nourished. No distress.   Chronically ill   HENT:   Head: Normocephalic.   Eyes: EOM are normal. Pupils are equal, round, and reactive to light.   Neck: Normal range of motion. Neck supple. No JVD present. No thyromegaly present.   Cardiovascular: Normal rate, regular rhythm, S1 normal, S2 normal and intact distal pulses. PMI is not displaced. Exam reveals no gallop and no friction rub.   Murmur heard.  No evidence of CHF at this time.   Pulmonary/Chest: Effort normal and breath sounds normal. No respiratory distress. He has no wheezes. He has no rales. He exhibits no tenderness.   Abdominal: Soft. Bowel sounds are normal. He exhibits no distension and no mass. There is no hepatosplenomegaly. There is no tenderness. There is no rebound and no CVA tenderness. No hernia.   There is a drain in the lower abdomen with serosanguineous output.  There is a old colostomy site with granulation tissue.  There is a well-formed right upper quadrant loop ileostomy   Musculoskeletal: Normal range of motion. He exhibits no edema or tenderness.   Lymphadenopathy:     He has no cervical adenopathy.   Neurological: He is alert and oriented to person, place, and time. He has normal reflexes. He is not disoriented. He displays normal reflexes. No cranial nerve deficit. He exhibits normal muscle tone. Coordination normal.   Skin: Skin is warm and dry. Capillary refill takes less than 2 seconds. No rash noted. No erythema.   Psychiatric: He has a normal mood and affect. His behavior is normal. Judgment and thought content normal.   Nursing note and vitals reviewed.      Significant Labs:  All labs within the past 24 hours have been reviewed.     Significant Imaging:  Labs: Reviewed    Assessment/Plan:     Acute renal failure    Acute kidney injury much better with IV fluids.  Continue with normal saline  today @ 100 cc/hour.  Hold Ace inhibitor and diuretics for now.  No evidence of CHF by exam.  Urine sodium is less than 20.  Fractional excretion of sodium is less than 1%.      Outpatient should go home on ACEi with weekly nurse visit and monthly MD visit with dr. Seth          Thank you for your consult.     Sandoval Damico MD  Nephrology  [facility]

## 2018-12-14 LAB
ANION GAP SERPL CALC-SCNC: 6 MMOL/L
BUN SERPL-MCNC: 38 MG/DL
CALCIUM SERPL-MCNC: 9 MG/DL
CHLORIDE SERPL-SCNC: 111 MMOL/L
CO2 SERPL-SCNC: 17 MMOL/L
CREAT SERPL-MCNC: 2 MG/DL
EST. GFR  (AFRICAN AMERICAN): 39 ML/MIN/1.73 M^2
EST. GFR  (NON AFRICAN AMERICAN): 34 ML/MIN/1.73 M^2
GLUCOSE SERPL-MCNC: 93 MG/DL
POCT GLUCOSE: 106 MG/DL (ref 70–110)
POCT GLUCOSE: 107 MG/DL (ref 70–110)
POCT GLUCOSE: 145 MG/DL (ref 70–110)
POCT GLUCOSE: 78 MG/DL (ref 70–110)
POCT GLUCOSE: 89 MG/DL (ref 70–110)
POTASSIUM SERPL-SCNC: 4.7 MMOL/L
SODIUM SERPL-SCNC: 134 MMOL/L

## 2018-12-14 PROCEDURE — 80048 BASIC METABOLIC PNL TOTAL CA: CPT

## 2018-12-14 PROCEDURE — 63600175 PHARM REV CODE 636 W HCPCS: Performed by: SURGERY

## 2018-12-14 PROCEDURE — 25000003 PHARM REV CODE 250: Performed by: SURGERY

## 2018-12-14 PROCEDURE — 99233 SBSQ HOSP IP/OBS HIGH 50: CPT | Mod: ,,, | Performed by: INTERNAL MEDICINE

## 2018-12-14 PROCEDURE — 36415 COLL VENOUS BLD VENIPUNCTURE: CPT

## 2018-12-14 PROCEDURE — 11000001 HC ACUTE MED/SURG PRIVATE ROOM

## 2018-12-14 PROCEDURE — 96372 THER/PROPH/DIAG INJ SC/IM: CPT

## 2018-12-14 PROCEDURE — 99233 PR SUBSEQUENT HOSPITAL CARE,LEVL III: ICD-10-PCS | Mod: ,,, | Performed by: INTERNAL MEDICINE

## 2018-12-14 RX ORDER — DIPHENOXYLATE HYDROCHLORIDE AND ATROPINE SULFATE 2.5; .025 MG/1; MG/1
1 TABLET ORAL 4 TIMES DAILY PRN
Qty: 120 TABLET | Refills: 0 | Status: SHIPPED | OUTPATIENT
Start: 2018-12-14 | End: 2018-12-24

## 2018-12-14 RX ADMIN — ASPIRIN 81 MG: 81 TABLET, COATED ORAL at 06:12

## 2018-12-14 RX ADMIN — BIMATOPROST 1 DROP: 0.1 SOLUTION/ DROPS OPHTHALMIC at 09:12

## 2018-12-14 RX ADMIN — PANTOPRAZOLE SODIUM 40 MG: 40 TABLET, DELAYED RELEASE ORAL at 09:12

## 2018-12-14 RX ADMIN — DIPHENOXYLATE HYDROCHLORIDE AND ATROPINE SULFATE 1 TABLET: 2.5; .025 TABLET ORAL at 04:12

## 2018-12-14 RX ADMIN — ALLOPURINOL 100 MG: 100 TABLET ORAL at 09:12

## 2018-12-14 RX ADMIN — INSULIN DETEMIR 15 UNITS: 100 INJECTION, SOLUTION SUBCUTANEOUS at 09:12

## 2018-12-14 RX ADMIN — CARVEDILOL 25 MG: 12.5 TABLET, FILM COATED ORAL at 09:12

## 2018-12-14 RX ADMIN — DIPHENOXYLATE HYDROCHLORIDE AND ATROPINE SULFATE 1 TABLET: 2.5; .025 TABLET ORAL at 09:12

## 2018-12-14 RX ADMIN — DIPHENOXYLATE HYDROCHLORIDE AND ATROPINE SULFATE 1 TABLET: 2.5; .025 TABLET ORAL at 01:12

## 2018-12-14 RX ADMIN — SIMVASTATIN 10 MG: 5 TABLET, FILM COATED ORAL at 09:12

## 2018-12-14 RX ADMIN — BACITRACIN ZINC NEOMYCIN SULFATE POLYMYXIN B SULFATE: 400; 3.5; 5 OINTMENT TOPICAL at 09:12

## 2018-12-14 RX ADMIN — ENOXAPARIN SODIUM 30 MG: 100 INJECTION SUBCUTANEOUS at 04:12

## 2018-12-14 NOTE — PLAN OF CARE
Problem: Adult Inpatient Plan of Care  Goal: Plan of Care Review  Outcome: Ongoing (interventions implemented as appropriate)  Plan of care reviewed with patient; verbalized understanding. Fall precautions maintained, patient remains free from injury. Denies pain. Medications being administered as ordered. Vital signs stable with no signs of distress noted. Bed low and locked with call light in reach. Will continue monitor.

## 2018-12-14 NOTE — PROGRESS NOTES
[facility] -   Nephrology  Progress Note    Patient Name: Yan Choudhury  MRN: [id number]  Admission Date: 12/11/2018  Hospital Length of Stay: 2 days  Attending Provider: Kevin Lindsay MD   Primary Care Physician: Sandra Estevez MD  Principal Problem:Complication of surgical procedure    Subjective:     HPI: Patient is a 63-year-old male with history of acute kidney injury in November of 2018 and was seen and managed by myself.  His acute kidney injury responded very well with IV fluids.  He was seen in the outpatient clinic by Dr. Berg and his creatinine had improved.  Patient is now admitted back with recurrent acute kidney injury and hyponatremia due to dehydration.  Patient's has good sleep and appetite no fevers and chills.  No evidence of sepsis.  No evidence of infection.  Patient has no symptoms of CHF either.  Patient has been started on normal saline.  We stopped his ACE-inhibitor and diuretics.  His creatinine on admission was 4.6 which has improved down to 4.0 today.  We will continue IV fluids today.  Rest of his HPI as copied from the previous consultation in November of 2018 is as follows:    Patient is a 63-year-old male with history of chronic kidney disease followed by our division in the outpatient clinic.  Patient has been seen by Dr. Seth and Dr. Juan Luis Galeas in the past.  Patient has had history of diverticular perforation requiring colostomy.  Patient is status post colostomy reversal as well as repair of paraostomy hernia repair.  Patient was recently hospitalized with dehydration.  Patient also has history of congestive heart failure followed by Dr. Calvillo as an outpatient.  Patient takes metolazone as an outpatient for diuretics.  Patient was seen by Dr. Lindsay as an outpatient today and was admitted for dehydration.  Patient's baseline creatinine ranged between 1.8 and 2.1.  Patient never had any proteinuria.  His creatinine has gone up to 8.2 with a BUN elevation as well  as hyponatremia.  Nephrology consultation is requested for acute kidney injury on chronic kidney disease stage 3 4/stage IV.         Interval History:     12/14/18: Patient without complaints today. Renal function continues to improve with IV fluids.         Review of patient's allergies indicates:  No Known Allergies  Current Facility-Administered Medications   Medication Frequency    0.9%  NaCl infusion Continuous    acetaminophen tablet 650 mg Q8H PRN    allopurinol tablet 100 mg Daily    aspirin EC tablet 81 mg QAM    bimatoprost 0.01 % ophthalmic drops 1 drop QHS    carvedilol tablet 25 mg Daily    dextrose 50% injection 12.5 g PRN    dextrose 50% injection 25 g PRN    diphenhydrAMINE capsule 25 mg Q4H PRN    diphenoxylate-atropine 2.5-0.025 mg per tablet 1 tablet QID    enoxaparin injection 30 mg Daily    glucagon (human recombinant) injection 1 mg PRN    glucose chewable tablet 16 g PRN    glucose chewable tablet 24 g PRN    HYDROcodone-acetaminophen 5-325 mg per tablet 1 tablet Q6H PRN    insulin aspart U-100 pen 1-10 Units QID (AC + HS) PRN    insulin detemir U-100 pen 15 Units Daily    neomycin-bacitracin-polymyxin ointment BID    ondansetron disintegrating tablet 8 mg Q8H PRN    pantoprazole EC tablet 40 mg Daily    ramelteon tablet 8 mg Nightly PRN    simvastatin tablet 10 mg QHS    sodium chloride 0.9% flush 3 mL PRN       Objective:     Vital Signs (Most Recent):  Temp: 98.6 °F (37 °C) (12/14/18 0709)  Pulse: 75 (12/14/18 0709)  Resp: 14 (12/14/18 0709)  BP: (!) 99/59 (12/14/18 0709)  SpO2: 99 % (12/14/18 0709)  O2 Device (Oxygen Therapy): room air (12/14/18 0709) Vital Signs (24h Range):  Temp:  [97.6 °F (36.4 °C)-98.7 °F (37.1 °C)] 98.6 °F (37 °C)  Pulse:  [75-85] 75  Resp:  [14-20] 14  SpO2:  [99 %-100 %] 99 %  BP: ()/(59-76) 99/59     Weight: 77.6 kg (171 lb 1.2 oz) (12/14/18 0600)  Body mass index is 26.01 kg/m².  Body surface area is 1.93 meters squared.    I/O last 3  completed shifts:  In: 3950 [P.O.:2200; I.V.:1750]  Out: 4620 [Urine:2150; Other:20; Stool:2450]    Physical Exam   Constitutional: He appears well-developed. He is cooperative.   HENT:   Head: Normocephalic.   Nose: No rhinorrhea.   Mouth/Throat: Mucous membranes are normal. No oropharyngeal exudate.   Neck: No thyroid mass present.   Cardiovascular: Normal rate, regular rhythm, S1 normal, S2 normal and intact distal pulses.   Pulmonary/Chest: Effort normal. No respiratory distress. He has no wheezes.   Abdominal: Soft. Bowel sounds are normal. He exhibits no distension. There is no tenderness. No hernia.   Ileostomy with bag in place.    Musculoskeletal: He exhibits no edema.   Lymphadenopathy:     He has no cervical adenopathy.   Neurological: He is alert.   Skin: Skin is warm and dry.   Psychiatric: He has a normal mood and affect.       Significant Labs:  Lab Results   Component Value Date    CREATININE 2.0 (H) 12/14/2018    BUN 38 (H) 12/14/2018     (L) 12/14/2018    K 4.7 12/14/2018     (H) 12/14/2018    CO2 17 (L) 12/14/2018     Lab Results   Component Value Date    .0 (H) 08/10/2018    CALCIUM 9.0 12/14/2018    PHOS 3.5 12/13/2018     Lab Results   Component Value Date    ALBUMIN 2.6 (L) 12/13/2018     Lab Results   Component Value Date    WBC 12.80 (H) 12/10/2018    HGB 12.7 (L) 12/10/2018    HCT 39.4 (L) 12/10/2018    MCV 84 12/10/2018     12/10/2018     No results for input(s): MG in the last 168 hours.        Significant Imaging:  Imaging Results    None           Assessment/Plan:     Acute renal failure    Acute kidney injury has improved furthrt with IV fluids and creatinine has now declined to 2.  Continue with normal saline @ 100 cc/hour.  Hold Ace inhibitor and diuretics for now.  No evidence of CHF by exam.     OK to restart ACE-I upon discharge.          Thank you for your consult. I will follow-up with patient. Please contact us if you have any additional  questions.    Aubrey Seth MD  Nephrology  [facility]

## 2018-12-14 NOTE — SUBJECTIVE & OBJECTIVE
Interval History:     12/14/18:         Review of patient's allergies indicates:  No Known Allergies  Current Facility-Administered Medications   Medication Frequency    0.9%  NaCl infusion Continuous    acetaminophen tablet 650 mg Q8H PRN    allopurinol tablet 100 mg Daily    aspirin EC tablet 81 mg QAM    bimatoprost 0.01 % ophthalmic drops 1 drop QHS    carvedilol tablet 25 mg Daily    dextrose 50% injection 12.5 g PRN    dextrose 50% injection 25 g PRN    diphenhydrAMINE capsule 25 mg Q4H PRN    diphenoxylate-atropine 2.5-0.025 mg per tablet 1 tablet QID    enoxaparin injection 30 mg Daily    glucagon (human recombinant) injection 1 mg PRN    glucose chewable tablet 16 g PRN    glucose chewable tablet 24 g PRN    HYDROcodone-acetaminophen 5-325 mg per tablet 1 tablet Q6H PRN    insulin aspart U-100 pen 1-10 Units QID (AC + HS) PRN    insulin detemir U-100 pen 15 Units Daily    neomycin-bacitracin-polymyxin ointment BID    ondansetron disintegrating tablet 8 mg Q8H PRN    pantoprazole EC tablet 40 mg Daily    ramelteon tablet 8 mg Nightly PRN    simvastatin tablet 10 mg QHS    sodium chloride 0.9% flush 3 mL PRN       Objective:     Vital Signs (Most Recent):  Temp: 98.6 °F (37 °C) (12/14/18 0709)  Pulse: 75 (12/14/18 0709)  Resp: 14 (12/14/18 0709)  BP: (!) 99/59 (12/14/18 0709)  SpO2: 99 % (12/14/18 0709)  O2 Device (Oxygen Therapy): room air (12/14/18 0709) Vital Signs (24h Range):  Temp:  [97.6 °F (36.4 °C)-98.7 °F (37.1 °C)] 98.6 °F (37 °C)  Pulse:  [75-85] 75  Resp:  [14-20] 14  SpO2:  [99 %-100 %] 99 %  BP: ()/(59-76) 99/59     Weight: 77.6 kg (171 lb 1.2 oz) (12/14/18 0600)  Body mass index is 26.01 kg/m².  Body surface area is 1.93 meters squared.    I/O last 3 completed shifts:  In: 3950 [P.O.:2200; I.V.:1750]  Out: 4620 [Urine:2150; Other:20; Stool:2450]    Physical Exam   Constitutional: He appears well-developed. He is cooperative.   HENT:   Head: Normocephalic.   Nose:  No rhinorrhea.   Mouth/Throat: Mucous membranes are normal. No oropharyngeal exudate.   Neck: No thyroid mass present.   Cardiovascular: Normal rate, regular rhythm, S1 normal, S2 normal and intact distal pulses.   Pulmonary/Chest: Effort normal. No respiratory distress. He has no wheezes.   Abdominal: Soft. Bowel sounds are normal. He exhibits no distension. There is no tenderness. No hernia.   Ileostomy with bag in place.    Musculoskeletal: He exhibits no edema.   Lymphadenopathy:     He has no cervical adenopathy.   Neurological: He is alert.   Skin: Skin is warm and dry.   Psychiatric: He has a normal mood and affect.       Significant Labs:  Lab Results   Component Value Date    CREATININE 2.0 (H) 12/14/2018    BUN 38 (H) 12/14/2018     (L) 12/14/2018    K 4.7 12/14/2018     (H) 12/14/2018    CO2 17 (L) 12/14/2018     Lab Results   Component Value Date    .0 (H) 08/10/2018    CALCIUM 9.0 12/14/2018    PHOS 3.5 12/13/2018     Lab Results   Component Value Date    ALBUMIN 2.6 (L) 12/13/2018     Lab Results   Component Value Date    WBC 12.80 (H) 12/10/2018    HGB 12.7 (L) 12/10/2018    HCT 39.4 (L) 12/10/2018    MCV 84 12/10/2018     12/10/2018     No results for input(s): MG in the last 168 hours.        Significant Imaging:  Imaging Results    None

## 2018-12-14 NOTE — PLAN OF CARE
Problem: Adult Inpatient Plan of Care  Goal: Plan of Care Review  Outcome: Ongoing (interventions implemented as appropriate)  Remains free from harm, no c/o pain, positions self, independent with ileostomy care, no acute distress noted. 24 hour chart check complete.

## 2018-12-14 NOTE — PLAN OF CARE
12/14/18 1050   Medicare Message   Important Message from Medicare regarding Discharge Appeal Rights Given to patient/caregiver;Explained to patient/caregiver;Signed/date by patient/caregiver   Date IMM was signed 12/14/18   Time IMM was signed 104

## 2018-12-14 NOTE — PROGRESS NOTES
[facility] -   General Surgery  Progress Note    Subjective:     History of Present Illness:  Patient underwent a colostomy reversal.  He is found to have a leak at the anastomosis.  A drain was placed.  At a protective ileostomy was performed.  Patient has had issues with ileostomy output and dehydration.  He was seen in the office and labs showed an increase in his BUN from 20-60 over the last 12 days.  The patient's drain is now putting out serosanguineous output.  He has been tolerating a regular diet.    Says his colostomy output has been semi solid, he is out of his Lomotil.    Post-Op Info:  * No surgery found *         Interval History:  BUN and creatinine her slowly improved    Medications:  Continuous Infusions:   sodium chloride 0.9% 1,000 mL (12/13/18 1200)     Scheduled Meds:   allopurinol  100 mg Oral Daily    aspirin  81 mg Oral QAM    bimatoprost  1 drop Right Eye QHS    carvedilol  25 mg Oral Daily    diphenoxylate-atropine 2.5-0.025 mg  1 tablet Oral QID    enoxaparin  30 mg Subcutaneous Daily    insulin detemir U-100  15 Units Subcutaneous Daily    neomycin-bacitracin-polymyxin   Topical (Top) BID    pantoprazole  40 mg Oral Daily    simvastatin  10 mg Oral QHS     PRN Meds:acetaminophen, dextrose 50%, dextrose 50%, diphenhydrAMINE, glucagon (human recombinant), glucose, glucose, HYDROcodone-acetaminophen, insulin aspart U-100, ondansetron, ramelteon, sodium chloride 0.9%     Review of patient's allergies indicates:  No Known Allergies  Objective:     Vital Signs (Most Recent):  Temp: 98.6 °F (37 °C) (12/14/18 0709)  Pulse: 75 (12/14/18 0709)  Resp: 14 (12/14/18 0709)  BP: (!) 99/59 (12/14/18 0709)  SpO2: 99 % (12/14/18 0709) Vital Signs (24h Range):  Temp:  [97.6 °F (36.4 °C)-98.7 °F (37.1 °C)] 98.6 °F (37 °C)  Pulse:  [75-85] 75  Resp:  [14-20] 14  SpO2:  [99 %-100 %] 99 %  BP: ()/(59-76) 99/59     Weight: 77.6 kg (171 lb 1.2 oz)  Body mass index is 26.01  kg/m².    Intake/Output - Last 3 Shifts       12/12 0700 - 12/13 0659 12/13 0700 - 12/14 0659 12/14 0700 - 12/15 0659    P.O. 2080 1400 120    I.V. (mL/kg) 2456.3 (31.6)      Total Intake(mL/kg) 4536.3 (58.4) 1400 (18) 120 (1.5)    Urine (mL/kg/hr) 1200 (0.6) 1450 (0.8) 125 (0.3)    Other 50      Stool 1100 1600 100    Total Output 2350 3050 225    Net +2186.3 -1650 -105                 Physical Exam   Constitutional: He is oriented to person, place, and time.   Chronically   HENT:   Head: Normocephalic and atraumatic.   Neck: Normal range of motion. Neck supple.   Cardiovascular: Normal rate, regular rhythm and normal heart sounds.   Pulmonary/Chest: Effort normal.   Abdominal: Soft. Bowel sounds are normal. He exhibits no distension. There is no tenderness.   Drain with clear output.  Ileostomy with semi solid/liquid output   Musculoskeletal: Normal range of motion.   Neurological: He is oriented to person, place, and time.   Skin: Skin is warm.   Vitals reviewed.      Significant Labs:  CBC:   Recent Labs   Lab 12/10/18  1517   WBC 12.80*   RBC 4.69   HGB 12.7*   HCT 39.4*      MCV 84   MCH 27.1   MCHC 32.2     BMP:   Recent Labs   Lab 12/14/18  0704   GLU 93   *   K 4.7   *   CO2 17*   BUN 38*   CREATININE 2.0*   CALCIUM 9.0       Significant Diagnostics:  None    Assessment/Plan:     * Complication of surgical procedure    Anastomotic leak, leave drain in place, flushed drain daily     Acute renal failure    Improving, continue ivf     Gastrointestinal anastomotic leak    Continue drain.  He will need a Gastrografin enema in size 4-6 weeks     Acute on chronic renal failure    Nephology not reviewed  Continue to re hydrated slowly per Nephrology.  Home diuretics per Nephrology.  Can be discharged home once BUN and creatinine of returned to normal     Ileostomy in place    Lomotil and Metamucil.  Will ask wound care to see for ostomy assistant     Chronic combined systolic and diastolic  congestive heart failure    Monitor, will add telemetry     Ischemic cardiomyopathy    Monitor         Kevin Lindsay MD  General Surgery  [facility]

## 2018-12-14 NOTE — SUBJECTIVE & OBJECTIVE
Interval History:  BUN and creatinine her slowly improved    Medications:  Continuous Infusions:   sodium chloride 0.9% 1,000 mL (12/13/18 1200)     Scheduled Meds:   allopurinol  100 mg Oral Daily    aspirin  81 mg Oral QAM    bimatoprost  1 drop Right Eye QHS    carvedilol  25 mg Oral Daily    diphenoxylate-atropine 2.5-0.025 mg  1 tablet Oral QID    enoxaparin  30 mg Subcutaneous Daily    insulin detemir U-100  15 Units Subcutaneous Daily    neomycin-bacitracin-polymyxin   Topical (Top) BID    pantoprazole  40 mg Oral Daily    simvastatin  10 mg Oral QHS     PRN Meds:acetaminophen, dextrose 50%, dextrose 50%, diphenhydrAMINE, glucagon (human recombinant), glucose, glucose, HYDROcodone-acetaminophen, insulin aspart U-100, ondansetron, ramelteon, sodium chloride 0.9%     Review of patient's allergies indicates:  No Known Allergies  Objective:     Vital Signs (Most Recent):  Temp: 98.6 °F (37 °C) (12/14/18 0709)  Pulse: 75 (12/14/18 0709)  Resp: 14 (12/14/18 0709)  BP: (!) 99/59 (12/14/18 0709)  SpO2: 99 % (12/14/18 0709) Vital Signs (24h Range):  Temp:  [97.6 °F (36.4 °C)-98.7 °F (37.1 °C)] 98.6 °F (37 °C)  Pulse:  [75-85] 75  Resp:  [14-20] 14  SpO2:  [99 %-100 %] 99 %  BP: ()/(59-76) 99/59     Weight: 77.6 kg (171 lb 1.2 oz)  Body mass index is 26.01 kg/m².    Intake/Output - Last 3 Shifts       12/12 0700 - 12/13 0659 12/13 0700 - 12/14 0659 12/14 0700 - 12/15 0659    P.O. 2080 1400 120    I.V. (mL/kg) 2456.3 (31.6)      Total Intake(mL/kg) 4536.3 (58.4) 1400 (18) 120 (1.5)    Urine (mL/kg/hr) 1200 (0.6) 1450 (0.8) 125 (0.3)    Other 50      Stool 1100 1600 100    Total Output 2350 3050 225    Net +2186.3 -1650 -105                 Physical Exam   Constitutional: He is oriented to person, place, and time.   Chronically   HENT:   Head: Normocephalic and atraumatic.   Neck: Normal range of motion. Neck supple.   Cardiovascular: Normal rate, regular rhythm and normal heart sounds.    Pulmonary/Chest: Effort normal.   Abdominal: Soft. Bowel sounds are normal. He exhibits no distension. There is no tenderness.   Drain with clear output.  Ileostomy with semi solid/liquid output   Musculoskeletal: Normal range of motion.   Neurological: He is oriented to person, place, and time.   Skin: Skin is warm.   Vitals reviewed.      Significant Labs:  CBC:   Recent Labs   Lab 12/10/18  1517   WBC 12.80*   RBC 4.69   HGB 12.7*   HCT 39.4*      MCV 84   MCH 27.1   MCHC 32.2     BMP:   Recent Labs   Lab 12/14/18  0704   GLU 93   *   K 4.7   *   CO2 17*   BUN 38*   CREATININE 2.0*   CALCIUM 9.0       Significant Diagnostics:  None

## 2018-12-14 NOTE — ASSESSMENT & PLAN NOTE
Acute kidney injury has improved furthrt with IV fluids and creatinine has now declined to 2.  Continue with normal saline @ 100 cc/hour.  Hold Ace inhibitor and diuretics for now.  No evidence of CHF by exam.     OK to restart ACE-I upon discharge.

## 2018-12-14 NOTE — ASSESSMENT & PLAN NOTE
Nephology not reviewed  Continue to re hydrated slowly per Nephrology.  Home diuretics per Nephrology.  Can be discharged home once BUN and creatinine of returned to normal

## 2018-12-15 LAB
ANION GAP SERPL CALC-SCNC: 3 MMOL/L
BUN SERPL-MCNC: 29 MG/DL
CALCIUM SERPL-MCNC: 8.7 MG/DL
CHLORIDE SERPL-SCNC: 115 MMOL/L
CO2 SERPL-SCNC: 17 MMOL/L
CREAT SERPL-MCNC: 1.6 MG/DL
EST. GFR  (AFRICAN AMERICAN): 51 ML/MIN/1.73 M^2
EST. GFR  (NON AFRICAN AMERICAN): 45 ML/MIN/1.73 M^2
GLUCOSE SERPL-MCNC: 88 MG/DL
POCT GLUCOSE: 126 MG/DL (ref 70–110)
POCT GLUCOSE: 138 MG/DL (ref 70–110)
POCT GLUCOSE: 90 MG/DL (ref 70–110)
POCT GLUCOSE: 94 MG/DL (ref 70–110)
POTASSIUM SERPL-SCNC: 4.6 MMOL/L
SODIUM SERPL-SCNC: 135 MMOL/L

## 2018-12-15 PROCEDURE — 80048 BASIC METABOLIC PNL TOTAL CA: CPT

## 2018-12-15 PROCEDURE — 99024 PR POST-OP FOLLOW-UP VISIT: ICD-10-PCS | Mod: POP,,, | Performed by: SURGERY

## 2018-12-15 PROCEDURE — 25000003 PHARM REV CODE 250: Performed by: SURGERY

## 2018-12-15 PROCEDURE — 99232 SBSQ HOSP IP/OBS MODERATE 35: CPT | Mod: ,,, | Performed by: INTERNAL MEDICINE

## 2018-12-15 PROCEDURE — 63600175 PHARM REV CODE 636 W HCPCS: Performed by: SURGERY

## 2018-12-15 PROCEDURE — 99024 POSTOP FOLLOW-UP VISIT: CPT | Mod: POP,,, | Performed by: SURGERY

## 2018-12-15 PROCEDURE — 99232 PR SUBSEQUENT HOSPITAL CARE,LEVL II: ICD-10-PCS | Mod: ,,, | Performed by: INTERNAL MEDICINE

## 2018-12-15 PROCEDURE — 11000001 HC ACUTE MED/SURG PRIVATE ROOM

## 2018-12-15 PROCEDURE — 36415 COLL VENOUS BLD VENIPUNCTURE: CPT

## 2018-12-15 RX ADMIN — DIPHENOXYLATE HYDROCHLORIDE AND ATROPINE SULFATE 1 TABLET: 2.5; .025 TABLET ORAL at 05:12

## 2018-12-15 RX ADMIN — ALLOPURINOL 100 MG: 100 TABLET ORAL at 09:12

## 2018-12-15 RX ADMIN — PANTOPRAZOLE SODIUM 40 MG: 40 TABLET, DELAYED RELEASE ORAL at 09:12

## 2018-12-15 RX ADMIN — BACITRACIN ZINC NEOMYCIN SULFATE POLYMYXIN B SULFATE: 400; 3.5; 5 OINTMENT TOPICAL at 09:12

## 2018-12-15 RX ADMIN — ASPIRIN 81 MG: 81 TABLET, COATED ORAL at 06:12

## 2018-12-15 RX ADMIN — DIPHENOXYLATE HYDROCHLORIDE AND ATROPINE SULFATE 1 TABLET: 2.5; .025 TABLET ORAL at 08:12

## 2018-12-15 RX ADMIN — CARVEDILOL 25 MG: 12.5 TABLET, FILM COATED ORAL at 09:12

## 2018-12-15 RX ADMIN — DIPHENOXYLATE HYDROCHLORIDE AND ATROPINE SULFATE 1 TABLET: 2.5; .025 TABLET ORAL at 09:12

## 2018-12-15 RX ADMIN — SIMVASTATIN 10 MG: 5 TABLET, FILM COATED ORAL at 08:12

## 2018-12-15 RX ADMIN — INSULIN DETEMIR 15 UNITS: 100 INJECTION, SOLUTION SUBCUTANEOUS at 09:12

## 2018-12-15 RX ADMIN — ENOXAPARIN SODIUM 30 MG: 100 INJECTION SUBCUTANEOUS at 05:12

## 2018-12-15 RX ADMIN — BIMATOPROST 1 DROP: 0.1 SOLUTION/ DROPS OPHTHALMIC at 08:12

## 2018-12-15 NOTE — PLAN OF CARE
Problem: Adult Inpatient Plan of Care  Goal: Plan of Care Review  Outcome: Ongoing (interventions implemented as appropriate)  No acute events overnight. IVF infusing as ordered. Wound and drain care performed. Ileostomy in place with stool output. Denies pain. VSS. Chart reviewed. Will monitor.

## 2018-12-15 NOTE — PROGRESS NOTES
[facility] -   Nephrology  Progress Note    Patient Name: Yan Choudhury  MRN: [id number]  Admission Date: 12/11/2018  Hospital Length of Stay: 3 days  Attending Provider: Kevin Lindsay MD   Primary Care Physician: Sandra Estevez MD  Principal Problem:Complication of surgical procedure    Subjective:     HPI: Patient is a 63-year-old male with history of acute kidney injury in November of 2018 and was seen and managed by myself.  His acute kidney injury responded very well with IV fluids.  He was seen in the outpatient clinic by Dr. Berg and his creatinine had improved.  Patient is now admitted back with recurrent acute kidney injury and hyponatremia due to dehydration.  Patient's has good sleep and appetite no fevers and chills.  No evidence of sepsis.  No evidence of infection.  Patient has no symptoms of CHF either.  Patient has been started on normal saline.  We stopped his ACE-inhibitor and diuretics.  His creatinine on admission was 4.6 which has improved down to 4.0 today.  We will continue IV fluids today.  Rest of his HPI as copied from the previous consultation in November of 2018 is as follows:    Patient is a 63-year-old male with history of chronic kidney disease followed by our division in the outpatient clinic.  Patient has been seen by Dr. Seth and Dr. Juan Luis Galeas in the past.  Patient has had history of diverticular perforation requiring colostomy.  Patient is status post colostomy reversal as well as repair of paraostomy hernia repair.  Patient was recently hospitalized with dehydration.  Patient also has history of congestive heart failure followed by Dr. Calvillo as an outpatient.  Patient takes metolazone as an outpatient for diuretics.  Patient was seen by Dr. Lindsay as an outpatient today and was admitted for dehydration.  Patient's baseline creatinine ranged between 1.8 and 2.1.  Patient never had any proteinuria.  His creatinine has gone up to 8.2 with a BUN elevation as well  as hyponatremia.  Nephrology consultation is requested for acute kidney injury on chronic kidney disease stage 3 4/stage IV.         Interval History:     12/14/18: Patient has no complaints at present.    12/15/18: patient is currently having breakfast. He denies any pain, SOB, nausea/vomiting. Renal function continues to improve.         Review of patient's allergies indicates:  No Known Allergies  Current Facility-Administered Medications   Medication Frequency    0.9%  NaCl infusion Continuous    acetaminophen tablet 650 mg Q8H PRN    allopurinol tablet 100 mg Daily    aspirin EC tablet 81 mg QAM    bimatoprost 0.01 % ophthalmic drops 1 drop QHS    carvedilol tablet 25 mg Daily    dextrose 50% injection 12.5 g PRN    dextrose 50% injection 25 g PRN    diphenhydrAMINE capsule 25 mg Q4H PRN    diphenoxylate-atropine 2.5-0.025 mg per tablet 1 tablet QID    enoxaparin injection 30 mg Daily    glucagon (human recombinant) injection 1 mg PRN    glucose chewable tablet 16 g PRN    glucose chewable tablet 24 g PRN    HYDROcodone-acetaminophen 5-325 mg per tablet 1 tablet Q6H PRN    insulin aspart U-100 pen 1-10 Units QID (AC + HS) PRN    insulin detemir U-100 pen 15 Units Daily    neomycin-bacitracin-polymyxin ointment BID    ondansetron disintegrating tablet 8 mg Q8H PRN    pantoprazole EC tablet 40 mg Daily    ramelteon tablet 8 mg Nightly PRN    simvastatin tablet 10 mg QHS    sodium chloride 0.9% flush 3 mL PRN       Objective:     Vital Signs (Most Recent):  Temp: 98.6 °F (37 °C) (12/15/18 0345)  Pulse: 94 (12/15/18 0345)  Resp: 20 (12/15/18 0345)  BP: 104/61 (12/15/18 0345)  SpO2: 100 % (12/15/18 0345)  O2 Device (Oxygen Therapy): room air (12/14/18 1936) Vital Signs (24h Range):  Temp:  [96.9 °F (36.1 °C)-98.6 °F (37 °C)] 98.6 °F (37 °C)  Pulse:  [71-94] 94  Resp:  [17-20] 20  SpO2:  [99 %-100 %] 100 %  BP: ()/(54-66) 104/61     Weight: 77.6 kg (171 lb 1.2 oz) (12/14/18 0600)  Body  mass index is 26.01 kg/m².  Body surface area is 1.93 meters squared.    I/O last 3 completed shifts:  In: 5338.3 [P.O.:1160; I.V.:4003.3; Other:175]  Out: 3750 [Urine:2150; Stool:1600]    Physical Exam   Constitutional: He appears well-developed. He is cooperative.   HENT:   Head: Normocephalic.   Nose: No rhinorrhea.   Mouth/Throat: Mucous membranes are normal. No oropharyngeal exudate.   Neck: No thyroid mass present.   Cardiovascular: Normal rate, regular rhythm, S1 normal, S2 normal and intact distal pulses.   Pulmonary/Chest: Effort normal. No respiratory distress. He has no wheezes.   Abdominal: Soft. Bowel sounds are normal. He exhibits no distension. There is no tenderness. No hernia.   Ileostomy with bag in place.    Musculoskeletal: He exhibits no edema.   Lymphadenopathy:     He has no cervical adenopathy.   Neurological: He is alert.   Skin: Skin is warm and dry.   Psychiatric: He has a normal mood and affect.       Significant Labs:  Lab Results   Component Value Date    CREATININE 1.6 (H) 12/15/2018    BUN 29 (H) 12/15/2018     (L) 12/15/2018    K 4.6 12/15/2018     (H) 12/15/2018    CO2 17 (L) 12/15/2018     Lab Results   Component Value Date    .0 (H) 08/10/2018    CALCIUM 8.7 12/15/2018    PHOS 3.5 12/13/2018     Lab Results   Component Value Date    ALBUMIN 2.6 (L) 12/13/2018     Lab Results   Component Value Date    WBC 12.80 (H) 12/10/2018    HGB 12.7 (L) 12/10/2018    HCT 39.4 (L) 12/10/2018    MCV 84 12/10/2018     12/10/2018     No results for input(s): MG in the last 168 hours.        Significant Imaging:  Imaging Results    None           Assessment/Plan:     Acute renal failure    Acute kidney injury has improved further with IV fluids and creatinine has now declined to 1.6.  Continue with normal saline @ 100 cc/hour.  Hold Ace inhibitor and diuretics for now.  No evidence of CHF by exam.     OK to restart ACE-I upon discharge.          Thank you for your consult.  I will follow-up with patient. Please contact us if you have any additional questions.    Aubrey Seth MD  Nephrology  [facility]

## 2018-12-15 NOTE — ASSESSMENT & PLAN NOTE
Acute kidney injury has improved further with IV fluids and creatinine has now declined to 1.6.  Continue with normal saline @ 100 cc/hour.  Hold Ace inhibitor and diuretics for now.  No evidence of CHF by exam.     OK to restart ACE-I upon discharge.

## 2018-12-15 NOTE — NURSING
Pt up in bed resting. No changes today. VSS with NADN. Side rails x 2 and call light with in reach. 12 hr chart check complete. Will continue to monitor patient.

## 2018-12-15 NOTE — SUBJECTIVE & OBJECTIVE
Interval History:     12/14/18: Patient has no complaints at present.    12/15/18: patient is currently having breakfast. He denies any pain, SOB, nausea/vomiting. Renal function continues to improve.         Review of patient's allergies indicates:  No Known Allergies  Current Facility-Administered Medications   Medication Frequency    0.9%  NaCl infusion Continuous    acetaminophen tablet 650 mg Q8H PRN    allopurinol tablet 100 mg Daily    aspirin EC tablet 81 mg QAM    bimatoprost 0.01 % ophthalmic drops 1 drop QHS    carvedilol tablet 25 mg Daily    dextrose 50% injection 12.5 g PRN    dextrose 50% injection 25 g PRN    diphenhydrAMINE capsule 25 mg Q4H PRN    diphenoxylate-atropine 2.5-0.025 mg per tablet 1 tablet QID    enoxaparin injection 30 mg Daily    glucagon (human recombinant) injection 1 mg PRN    glucose chewable tablet 16 g PRN    glucose chewable tablet 24 g PRN    HYDROcodone-acetaminophen 5-325 mg per tablet 1 tablet Q6H PRN    insulin aspart U-100 pen 1-10 Units QID (AC + HS) PRN    insulin detemir U-100 pen 15 Units Daily    neomycin-bacitracin-polymyxin ointment BID    ondansetron disintegrating tablet 8 mg Q8H PRN    pantoprazole EC tablet 40 mg Daily    ramelteon tablet 8 mg Nightly PRN    simvastatin tablet 10 mg QHS    sodium chloride 0.9% flush 3 mL PRN       Objective:     Vital Signs (Most Recent):  Temp: 98.6 °F (37 °C) (12/15/18 0345)  Pulse: 94 (12/15/18 0345)  Resp: 20 (12/15/18 0345)  BP: 104/61 (12/15/18 0345)  SpO2: 100 % (12/15/18 0345)  O2 Device (Oxygen Therapy): room air (12/14/18 1936) Vital Signs (24h Range):  Temp:  [96.9 °F (36.1 °C)-98.6 °F (37 °C)] 98.6 °F (37 °C)  Pulse:  [71-94] 94  Resp:  [17-20] 20  SpO2:  [99 %-100 %] 100 %  BP: ()/(54-66) 104/61     Weight: 77.6 kg (171 lb 1.2 oz) (12/14/18 0600)  Body mass index is 26.01 kg/m².  Body surface area is 1.93 meters squared.    I/O last 3 completed shifts:  In: 5338.3 [P.O.:1160;  I.V.:4003.3; Other:175]  Out: 3750 [Urine:2150; Stool:1600]    Physical Exam   Constitutional: He appears well-developed. He is cooperative.   HENT:   Head: Normocephalic.   Nose: No rhinorrhea.   Mouth/Throat: Mucous membranes are normal. No oropharyngeal exudate.   Neck: No thyroid mass present.   Cardiovascular: Normal rate, regular rhythm, S1 normal, S2 normal and intact distal pulses.   Pulmonary/Chest: Effort normal. No respiratory distress. He has no wheezes.   Abdominal: Soft. Bowel sounds are normal. He exhibits no distension. There is no tenderness. No hernia.   Ileostomy with bag in place.    Musculoskeletal: He exhibits no edema.   Lymphadenopathy:     He has no cervical adenopathy.   Neurological: He is alert.   Skin: Skin is warm and dry.   Psychiatric: He has a normal mood and affect.       Significant Labs:  Lab Results   Component Value Date    CREATININE 1.6 (H) 12/15/2018    BUN 29 (H) 12/15/2018     (L) 12/15/2018    K 4.6 12/15/2018     (H) 12/15/2018    CO2 17 (L) 12/15/2018     Lab Results   Component Value Date    .0 (H) 08/10/2018    CALCIUM 8.7 12/15/2018    PHOS 3.5 12/13/2018     Lab Results   Component Value Date    ALBUMIN 2.6 (L) 12/13/2018     Lab Results   Component Value Date    WBC 12.80 (H) 12/10/2018    HGB 12.7 (L) 12/10/2018    HCT 39.4 (L) 12/10/2018    MCV 84 12/10/2018     12/10/2018     No results for input(s): MG in the last 168 hours.        Significant Imaging:  Imaging Results    None

## 2018-12-16 VITALS
HEIGHT: 68 IN | WEIGHT: 178.13 LBS | SYSTOLIC BLOOD PRESSURE: 126 MMHG | HEART RATE: 87 BPM | DIASTOLIC BLOOD PRESSURE: 72 MMHG | BODY MASS INDEX: 27 KG/M2 | RESPIRATION RATE: 18 BRPM | TEMPERATURE: 98 F | OXYGEN SATURATION: 98 %

## 2018-12-16 LAB
ANION GAP SERPL CALC-SCNC: 6 MMOL/L
BUN SERPL-MCNC: 25 MG/DL
CALCIUM SERPL-MCNC: 9.1 MG/DL
CHLORIDE SERPL-SCNC: 115 MMOL/L
CO2 SERPL-SCNC: 15 MMOL/L
CREAT SERPL-MCNC: 1.5 MG/DL
EST. GFR  (AFRICAN AMERICAN): 56 ML/MIN/1.73 M^2
EST. GFR  (NON AFRICAN AMERICAN): 48 ML/MIN/1.73 M^2
GLUCOSE SERPL-MCNC: 98 MG/DL
POCT GLUCOSE: 112 MG/DL (ref 70–110)
POTASSIUM SERPL-SCNC: 4.7 MMOL/L
SODIUM SERPL-SCNC: 136 MMOL/L

## 2018-12-16 PROCEDURE — 99232 PR SUBSEQUENT HOSPITAL CARE,LEVL II: ICD-10-PCS | Mod: ,,, | Performed by: INTERNAL MEDICINE

## 2018-12-16 PROCEDURE — 25000003 PHARM REV CODE 250: Performed by: SURGERY

## 2018-12-16 PROCEDURE — 80048 BASIC METABOLIC PNL TOTAL CA: CPT

## 2018-12-16 PROCEDURE — 99024 PR POST-OP FOLLOW-UP VISIT: ICD-10-PCS | Mod: POP,,, | Performed by: SURGERY

## 2018-12-16 PROCEDURE — 99232 SBSQ HOSP IP/OBS MODERATE 35: CPT | Mod: ,,, | Performed by: INTERNAL MEDICINE

## 2018-12-16 PROCEDURE — 99024 POSTOP FOLLOW-UP VISIT: CPT | Mod: POP,,, | Performed by: SURGERY

## 2018-12-16 PROCEDURE — 36415 COLL VENOUS BLD VENIPUNCTURE: CPT

## 2018-12-16 RX ADMIN — BACITRACIN ZINC NEOMYCIN SULFATE POLYMYXIN B SULFATE: 400; 3.5; 5 OINTMENT TOPICAL at 09:12

## 2018-12-16 RX ADMIN — ALLOPURINOL 100 MG: 100 TABLET ORAL at 09:12

## 2018-12-16 RX ADMIN — PANTOPRAZOLE SODIUM 40 MG: 40 TABLET, DELAYED RELEASE ORAL at 09:12

## 2018-12-16 RX ADMIN — DIPHENOXYLATE HYDROCHLORIDE AND ATROPINE SULFATE 1 TABLET: 2.5; .025 TABLET ORAL at 09:12

## 2018-12-16 RX ADMIN — ASPIRIN 81 MG: 81 TABLET, COATED ORAL at 06:12

## 2018-12-16 RX ADMIN — CARVEDILOL 25 MG: 12.5 TABLET, FILM COATED ORAL at 09:12

## 2018-12-16 RX ADMIN — INSULIN DETEMIR 15 UNITS: 100 INJECTION, SOLUTION SUBCUTANEOUS at 09:12

## 2018-12-16 NOTE — PROGRESS NOTES
"SW f/u with patient to provide IMM.  Patient discharged but still showing as "admitted" in patient station.   Unable to determine actual discharge time or if patient discharged before IMM  at 1046 a.m. on this date.  Tammi Gardiner LMSW, KATELYN-GEORGETTE, [facility]  2018  "

## 2018-12-16 NOTE — DISCHARGE SUMMARY
[facility] -   General Surgery  Discharge Summary      Patient Name: Yan Choudhury  MRN: [id number]  Admission Date: 12/11/2018  Hospital Length of Stay: 4 days  Discharge Date and Time:  12/16/2018 9:39 AM  Attending Physician: Kevin Oconnor MD   Discharging Provider: Jean Márquez MD  Primary Care Provider: Sandra Estevez MD    HPI:   Patient underwent a colostomy reversal.  He is found to have a leak at the anastomosis.  A drain was placed.  At a protective ileostomy was performed.  Patient has had issues with ileostomy output and dehydration.  He was seen in the office and labs showed an increase in his BUN from 20-60 over the last 12 days.  The patient's drain is now putting out serosanguineous output.  He has been tolerating a regular diet.    Says his colostomy output has been semi solid, he is out of his Lomotil.    * No surgery found *      Indwelling Lines/Drains at time of discharge:   Lines/Drains/Airways     Drain                 Ileostomy 10/30/18 Loop RUQ 47 days         Closed/Suction Drain 10/30/18 1300 Abdomen Bulb 46 days              Hospital Course: Patient was admitted to the hospital and started on IV fluids.  His labs today show an elevation of BUN and creatinine.  Will ask Nephrology to see him to determine if he needs any changes in his Bumex specifically.  We will gently rehydrate him due to his congestive heart failure    BUN/CR improving, ileostomy output 1000 ml    Patient offers no complaints.  Ileostomy 1600 mL.  BUN and creatinine are slowly improving.    12/15/2018: Well hydrated, and is drinking adequte amount. BUN29/Cr 1.6  Discharge home tomorrow.    Consults:   Consults (From admission, onward)        Status Ordering Provider     Inpatient consult to Nephrology  Once     Provider:  (Not yet assigned)    Acknowledged KEVIN OCONNOR     IP consult to case management  Once     Provider:  (Not yet assigned)    Completed KEVIN OCONNOR          Significant Diagnostic  Studies: Labs:   BMP:   Recent Labs   Lab 12/15/18  0521 12/16/18  0438   GLU 88 98   * 136   K 4.6 4.7   * 115*   CO2 17* 15*   BUN 29* 25*   CREATININE 1.6* 1.5*   CALCIUM 8.7 9.1    and CBC No results for input(s): WBC, HGB, HCT, PLT in the last 48 hours.    Pending Diagnostic Studies:     None        Final Active Diagnoses:    Diagnosis Date Noted POA    PRINCIPAL PROBLEM:  Ileostomy in place [Z93.2] 11/05/2018 Not Applicable    Dehydration [E86.0]  Unknown    Acute renal failure [N17.9] 12/12/2018 Yes    Alteration in skin integrity [R23.9] 12/12/2018 Yes    Complication of surgical procedure [T81.9XXA] 12/11/2018 Yes    Gastrointestinal anastomotic leak [K91.89] 11/16/2018 Yes    Acute on chronic renal failure [N17.9, N18.9] 11/15/2018 Yes    Chronic combined systolic and diastolic congestive heart failure [I50.42] 03/06/2018 Yes    Ischemic cardiomyopathy [I25.5]  Yes      Problems Resolved During this Admission:      Discharged Condition: good    Disposition:     Follow Up:  Follow-up Information     Kevin Lindsay MD. Schedule an appointment as soon as possible for a visit in 2 weeks.    Specialty:  General Surgery  Contact information:  [address]  [place] [address] [phone]                 Patient Instructions:   No discharge procedures on file.  Medications:  Reconciled Home Medications:      Medication List      START taking these medications    diphenoxylate-atropine 2.5-0.025 mg 2.5-0.025 mg per tablet  Commonly known as:  LOMOTIL  Take 1 tablet by mouth 4 (four) times daily as needed for Diarrhea.        CHANGE how you take these medications    allopurinol 100 MG tablet  Commonly known as:  ZYLOPRIM  TAKE 1 TABLET (100 MG TOTAL) BY MOUTH ONCE DAILY.  What changed:    · how much to take  · how to take this  · when to take this     bimatoprost 0.03 % ophthalmic drops  Commonly known as:  LUMIGAN  Place 1 drop into the left eye every evening.  What changed:  how  "to take this     bumetanide 2 MG tablet  Commonly known as:  BUMEX  Take 1 tablet (2 mg total) by mouth 2 (two) times daily.  What changed:  when to take this     lisinopril 5 MG tablet  Commonly known as:  PRINIVIL,ZESTRIL  TAKE 1 TABLET (5 MG TOTAL) BY MOUTH ONCE DAILY.  What changed:  when to take this        CONTINUE taking these medications    aspirin 81 MG EC tablet  Commonly known as:  ECOTRIN  Take 81 mg by mouth every morning.     BD INSULIN SYRINGE ULTRA-FINE 0.5 mL 31 gauge x 5/16" Syrg  Generic drug:  insulin syringe-needle U-100  USE AS DIRECTED TO INJECT INSULIN TWO TIMES DAILY     blood sugar diagnostic Strp  1 strip by Misc.(Non-Drug; Combo Route) route 4 (four) times daily. [employer]     carvedilol 25 MG tablet  Commonly known as:  COREG  TAKE 1/2 TABLET BY MOUTH TWICE A DAY WITH MEALS     GERITOL ORAL  Take by mouth.     HYDROcodone-acetaminophen 5-325 mg per tablet  Commonly known as:  NORCO  One or 2 every 4-6 hours as needed for pain     insulin glargine 100 unit/mL injection  Commonly known as:  LANTUS  50 units bid prn when BS< 150     lancets Misc  For tel care     pantoprazole 40 MG tablet  Commonly known as:  PROTONIX  TAKE 1 TABLET BY MOUTH EVERY DAY FOR ACID REFLUX     pen needle, diabetic 32 gauge x 5/32" Ndle  Commonly known as:  BD ULTRA-FINE JOSLYN PEN NEEDLE  1 each by Misc.(Non-Drug; Combo Route) route 4 (four) times daily.     psyllium husk (aspartame) 3.4 gram Pwpk packet  Commonly known as:  METAMUCIL  Take 1 packet by mouth once daily.     simvastatin 10 MG tablet  Commonly known as:  ZOCOR  TAKE 1 TABLET (10 MG TOTAL) BY MOUTH EVERY EVENING.     VITAMIN D3 1,000 unit capsule  Generic drug:  cholecalciferol (vitamin D3)  Take 1,000 Units by mouth once daily.        STOP taking these medications    metroNIDAZOLE 500 MG tablet  Commonly known as:  FLAGYL     SUPREP BOWEL PREP KIT 17.5-3.13-1.6 gram Solr  Generic drug:  sodium,potassium,mag sulfates          Time spent on the discharge " of patient: 10 minutes    Jean Márquez MD  General Surgery  [facility]

## 2018-12-16 NOTE — PROGRESS NOTES
[facility] -   Nephrology  Progress Note    Patient Name: Yan Choudhury  MRN: [id number]  Admission Date: 12/11/2018  Hospital Length of Stay: 4 days  Attending Provider: Kevin Lindsay MD   Primary Care Physician: Sandra Estevez MD  Principal Problem:Complication of surgical procedure    Subjective:     HPI: Patient is a 63-year-old male with history of acute kidney injury in November of 2018 and was seen and managed by myself.  His acute kidney injury responded very well with IV fluids.  He was seen in the outpatient clinic by Dr. Berg and his creatinine had improved.  Patient is now admitted back with recurrent acute kidney injury and hyponatremia due to dehydration.  Patient's has good sleep and appetite no fevers and chills.  No evidence of sepsis.  No evidence of infection.  Patient has no symptoms of CHF either.  Patient has been started on normal saline.  We stopped his ACE-inhibitor and diuretics.  His creatinine on admission was 4.6 which has improved down to 4.0 today.  We will continue IV fluids today.  Rest of his HPI as copied from the previous consultation in November of 2018 is as follows:    Patient is a 63-year-old male with history of chronic kidney disease followed by our division in the outpatient clinic.  Patient has been seen by Dr. Seth and Dr. Juan Luis Galeas in the past.  Patient has had history of diverticular perforation requiring colostomy.  Patient is status post colostomy reversal as well as repair of paraostomy hernia repair.  Patient was recently hospitalized with dehydration.  Patient also has history of congestive heart failure followed by Dr. Calvillo as an outpatient.  Patient takes metolazone as an outpatient for diuretics.  Patient was seen by Dr. Lindsay as an outpatient today and was admitted for dehydration.  Patient's baseline creatinine ranged between 1.8 and 2.1.  Patient never had any proteinuria.  His creatinine has gone up to 8.2 with a BUN elevation as well  as hyponatremia.  Nephrology consultation is requested for acute kidney injury on chronic kidney disease stage 3 4/stage IV.         Interval History:     12/14/18: Patient has no complaints at present.    12/15/18: patient is currently having breakfast. He denies any pain, SOB, nausea/vomiting. Renal function continues to improve.     12/16/18: patient denies any pain, SOB, nausea. Renal function has improved further.             Review of patient's allergies indicates:  No Known Allergies  Current Facility-Administered Medications   Medication Frequency    acetaminophen tablet 650 mg Q8H PRN    allopurinol tablet 100 mg Daily    aspirin EC tablet 81 mg QAM    bimatoprost 0.01 % ophthalmic drops 1 drop QHS    carvedilol tablet 25 mg Daily    dextrose 50% injection 12.5 g PRN    dextrose 50% injection 25 g PRN    diphenhydrAMINE capsule 25 mg Q4H PRN    diphenoxylate-atropine 2.5-0.025 mg per tablet 1 tablet QID    enoxaparin injection 30 mg Daily    glucagon (human recombinant) injection 1 mg PRN    glucose chewable tablet 16 g PRN    glucose chewable tablet 24 g PRN    HYDROcodone-acetaminophen 5-325 mg per tablet 1 tablet Q6H PRN    insulin aspart U-100 pen 1-10 Units QID (AC + HS) PRN    insulin detemir U-100 pen 15 Units Daily    neomycin-bacitracin-polymyxin ointment BID    ondansetron disintegrating tablet 8 mg Q8H PRN    pantoprazole EC tablet 40 mg Daily    ramelteon tablet 8 mg Nightly PRN    simvastatin tablet 10 mg QHS    sodium chloride 0.9% flush 3 mL PRN       Objective:     Vital Signs (Most Recent):  Temp: 97.6 °F (36.4 °C) (12/16/18 0412)  Pulse: 79 (12/16/18 0412)  Resp: 18 (12/16/18 0412)  BP: 104/60 (12/16/18 0412)  SpO2: 99 % (12/16/18 0412)  O2 Device (Oxygen Therapy): room air (12/15/18 1920) Vital Signs (24h Range):  Temp:  [97.5 °F (36.4 °C)-98.6 °F (37 °C)] 97.6 °F (36.4 °C)  Pulse:  [72-88] 79  Resp:  [16-18] 18  SpO2:  [98 %-100 %] 99 %  BP: (103-125)/(60-70) 104/60      Weight: 80.8 kg (178 lb 2.1 oz) (12/16/18 0600)  Body mass index is 27.08 kg/m².  Body surface area is 1.97 meters squared.    I/O last 3 completed shifts:  In: 2326.7 [P.O.:1100; I.V.:1051.7; Other:175]  Out: 3075 [Urine:1900; Stool:1175]    Physical Exam   Constitutional: He appears well-developed. He is cooperative.   HENT:   Head: Normocephalic.   Nose: No rhinorrhea.   Mouth/Throat: Mucous membranes are normal. No oropharyngeal exudate.   Neck: No thyroid mass present.   Cardiovascular: Normal rate, regular rhythm, S1 normal, S2 normal and intact distal pulses.   Pulmonary/Chest: Effort normal. No respiratory distress. He has no wheezes.   Abdominal: Soft. Bowel sounds are normal. He exhibits no distension. There is no tenderness. No hernia.   Ileostomy with bag in place.    Musculoskeletal: He exhibits no edema.   Lymphadenopathy:     He has no cervical adenopathy.   Neurological: He is alert.   Skin: Skin is warm and dry.   Psychiatric: He has a normal mood and affect.       Significant Labs:  Lab Results   Component Value Date    CREATININE 1.5 (H) 12/16/2018    BUN 25 (H) 12/16/2018     12/16/2018    K 4.7 12/16/2018     (H) 12/16/2018    CO2 15 (L) 12/16/2018     Lab Results   Component Value Date    .0 (H) 08/10/2018    CALCIUM 9.1 12/16/2018    PHOS 3.5 12/13/2018     Lab Results   Component Value Date    ALBUMIN 2.6 (L) 12/13/2018     Lab Results   Component Value Date    WBC 12.80 (H) 12/10/2018    HGB 12.7 (L) 12/10/2018    HCT 39.4 (L) 12/10/2018    MCV 84 12/10/2018     12/10/2018     No results for input(s): MG in the last 168 hours.        Significant Imaging:  Imaging Results    None           Assessment/Plan:     Acute renal failure    Acute kidney injury has improved further with IV fluids and creatinine has now declined to 1.5.  IV fluids have been discontinued. Hold Ace inhibitor and diuretics for now.  No evidence of CHF by exam.     OK to restart ACE-I upon  discharge.          Thank you for your consult. I will follow-up with patient. Please contact us if you have any additional questions.    Aubrey Seth MD  Nephrology  [facility]

## 2018-12-16 NOTE — ASSESSMENT & PLAN NOTE
Acute kidney injury has improved further with IV fluids and creatinine has now declined to 1.5.  IV fluids have been discontinued. Hold Ace inhibitor and diuretics for now.  No evidence of CHF by exam.     OK to restart ACE-I upon discharge.

## 2018-12-16 NOTE — PLAN OF CARE
Problem: Adult Inpatient Plan of Care  Goal: Plan of Care Review  Outcome: Ongoing (interventions implemented as appropriate)  Plan of care discussed with patient and spouse. Possible side effects of medications was discussed. No concerns voiced. Pt denies pain at this time. Pain controled with positioning and relaxation techniques. Ambulating well with assistance. Remains free from injuries. Side rails up, nonskid socks placed, call bell within reach, personal area free from clutter. Will continue to monitor

## 2018-12-16 NOTE — PROGRESS NOTES
Home Health orders sent to [facility]  via [facility].    Tammi Gardiner LMSW,ACUSMAN-GEORGETTE, CCM  12/16/2018

## 2018-12-16 NOTE — NURSING
D/C instructions reviewed with patient. Verbalized instructions. IV removed, tolerated well. Colostomy appliance changed, clean, moist, with rosebud appearance. VSS. Wheeled out with Marley, with JAQUELINE.

## 2018-12-16 NOTE — SUBJECTIVE & OBJECTIVE
Interval History:     12/14/18: Patient has no complaints at present.    12/15/18: patient is currently having breakfast. He denies any pain, SOB, nausea/vomiting. Renal function continues to improve.     12/16/18: patient denies any pain, SOB, nausea. Renal function has improved further.             Review of patient's allergies indicates:  No Known Allergies  Current Facility-Administered Medications   Medication Frequency    acetaminophen tablet 650 mg Q8H PRN    allopurinol tablet 100 mg Daily    aspirin EC tablet 81 mg QAM    bimatoprost 0.01 % ophthalmic drops 1 drop QHS    carvedilol tablet 25 mg Daily    dextrose 50% injection 12.5 g PRN    dextrose 50% injection 25 g PRN    diphenhydrAMINE capsule 25 mg Q4H PRN    diphenoxylate-atropine 2.5-0.025 mg per tablet 1 tablet QID    enoxaparin injection 30 mg Daily    glucagon (human recombinant) injection 1 mg PRN    glucose chewable tablet 16 g PRN    glucose chewable tablet 24 g PRN    HYDROcodone-acetaminophen 5-325 mg per tablet 1 tablet Q6H PRN    insulin aspart U-100 pen 1-10 Units QID (AC + HS) PRN    insulin detemir U-100 pen 15 Units Daily    neomycin-bacitracin-polymyxin ointment BID    ondansetron disintegrating tablet 8 mg Q8H PRN    pantoprazole EC tablet 40 mg Daily    ramelteon tablet 8 mg Nightly PRN    simvastatin tablet 10 mg QHS    sodium chloride 0.9% flush 3 mL PRN       Objective:     Vital Signs (Most Recent):  Temp: 97.6 °F (36.4 °C) (12/16/18 0412)  Pulse: 79 (12/16/18 0412)  Resp: 18 (12/16/18 0412)  BP: 104/60 (12/16/18 0412)  SpO2: 99 % (12/16/18 0412)  O2 Device (Oxygen Therapy): room air (12/15/18 1920) Vital Signs (24h Range):  Temp:  [97.5 °F (36.4 °C)-98.6 °F (37 °C)] 97.6 °F (36.4 °C)  Pulse:  [72-88] 79  Resp:  [16-18] 18  SpO2:  [98 %-100 %] 99 %  BP: (103-125)/(60-70) 104/60     Weight: 80.8 kg (178 lb 2.1 oz) (12/16/18 0600)  Body mass index is 27.08 kg/m².  Body surface area is 1.97 meters  squared.    I/O last 3 completed shifts:  In: 2326.7 [P.O.:1100; I.V.:1051.7; Other:175]  Out: 3075 [Urine:1900; Stool:1175]    Physical Exam   Constitutional: He appears well-developed. He is cooperative.   HENT:   Head: Normocephalic.   Nose: No rhinorrhea.   Mouth/Throat: Mucous membranes are normal. No oropharyngeal exudate.   Neck: No thyroid mass present.   Cardiovascular: Normal rate, regular rhythm, S1 normal, S2 normal and intact distal pulses.   Pulmonary/Chest: Effort normal. No respiratory distress. He has no wheezes.   Abdominal: Soft. Bowel sounds are normal. He exhibits no distension. There is no tenderness. No hernia.   Ileostomy with bag in place.    Musculoskeletal: He exhibits no edema.   Lymphadenopathy:     He has no cervical adenopathy.   Neurological: He is alert.   Skin: Skin is warm and dry.   Psychiatric: He has a normal mood and affect.       Significant Labs:  Lab Results   Component Value Date    CREATININE 1.5 (H) 12/16/2018    BUN 25 (H) 12/16/2018     12/16/2018    K 4.7 12/16/2018     (H) 12/16/2018    CO2 15 (L) 12/16/2018     Lab Results   Component Value Date    .0 (H) 08/10/2018    CALCIUM 9.1 12/16/2018    PHOS 3.5 12/13/2018     Lab Results   Component Value Date    ALBUMIN 2.6 (L) 12/13/2018     Lab Results   Component Value Date    WBC 12.80 (H) 12/10/2018    HGB 12.7 (L) 12/10/2018    HCT 39.4 (L) 12/10/2018    MCV 84 12/10/2018     12/10/2018     No results for input(s): MG in the last 168 hours.        Significant Imaging:  Imaging Results    None

## 2018-12-17 ENCOUNTER — CLINICAL SUPPORT (OUTPATIENT)
Dept: CARDIOLOGY | Facility: CLINIC | Age: 63
End: 2018-12-17
Attending: INTERNAL MEDICINE
Payer: MEDICARE

## 2018-12-17 DIAGNOSIS — Z95.810 ICD (IMPLANTABLE CARDIOVERTER-DEFIBRILLATOR) IN PLACE: ICD-10-CM

## 2018-12-17 DIAGNOSIS — I25.5 ISCHEMIC CARDIOMYOPATHY: ICD-10-CM

## 2018-12-17 DIAGNOSIS — I50.42 CHRONIC COMBINED SYSTOLIC AND DIASTOLIC HEART FAILURE: ICD-10-CM

## 2018-12-17 PROCEDURE — 93296 REM INTERROG EVL PM/IDS: CPT | Mod: PBBFAC,PO | Performed by: INTERNAL MEDICINE

## 2018-12-17 PROCEDURE — 93297 REM INTERROG DEV EVAL ICPMS: CPT | Mod: ,,, | Performed by: INTERNAL MEDICINE

## 2018-12-17 PROCEDURE — 93295 DEV INTERROG REMOTE 1/2/MLT: CPT | Mod: ,,, | Performed by: INTERNAL MEDICINE

## 2018-12-17 NOTE — PLAN OF CARE
12/17/18 0757   Final Note   Assessment Type Final Discharge Note   Anticipated Discharge Disposition Home-Health   Right Care Referral Info   Post Acute Recommendation Home-care   Facility Name [facility]

## 2018-12-27 ENCOUNTER — OFFICE VISIT (OUTPATIENT)
Dept: SURGERY | Facility: CLINIC | Age: 63
DRG: 641 | End: 2018-12-27
Payer: MEDICARE

## 2018-12-27 VITALS
SYSTOLIC BLOOD PRESSURE: 125 MMHG | HEART RATE: 68 BPM | DIASTOLIC BLOOD PRESSURE: 65 MMHG | WEIGHT: 178 LBS | BODY MASS INDEX: 27.06 KG/M2 | TEMPERATURE: 98 F

## 2018-12-27 DIAGNOSIS — E87.1 HYPONATREMIA: ICD-10-CM

## 2018-12-27 DIAGNOSIS — K91.89 ANASTOMOTIC LEAK OF INTESTINE: Primary | ICD-10-CM

## 2018-12-27 DIAGNOSIS — Z93.2 ILEOSTOMY IN PLACE: ICD-10-CM

## 2018-12-27 PROCEDURE — 99214 OFFICE O/P EST MOD 30 MIN: CPT | Mod: PBBFAC,PO | Performed by: SURGERY

## 2018-12-27 PROCEDURE — 99024 POSTOP FOLLOW-UP VISIT: CPT | Mod: POP,,, | Performed by: SURGERY

## 2018-12-27 PROCEDURE — 99999 PR PBB SHADOW E&M-EST. PATIENT-LVL IV: CPT | Mod: PBBFAC,,, | Performed by: SURGERY

## 2018-12-27 RX ORDER — DIPHENOXYLATE HYDROCHLORIDE AND ATROPINE SULFATE 2.5; .025 MG/1; MG/1
1 TABLET ORAL 4 TIMES DAILY
Qty: 120 TABLET | Refills: 0 | Status: SHIPPED | OUTPATIENT
Start: 2018-12-27 | End: 2019-01-06

## 2018-12-27 RX ORDER — AMOXICILLIN 875 MG/1
875 TABLET, FILM COATED ORAL 2 TIMES DAILY
Status: ON HOLD | COMMUNITY
End: 2019-01-04 | Stop reason: HOSPADM

## 2018-12-27 NOTE — PROGRESS NOTES
Subjective:       Patient ID: Yan Choudhury is a 63 y.o. male.    Anastomotic leak with protective loop ileostomy    Chief Complaint: Post-op Evaluation    Patient underwent a colostomy reversal.  He had a leak at the anastomosis. A protective ileostomy was formed.  He has a drain in place that now has serous output.  He has had high is ileostomy outputs in the past requiring admission and rehydration.  He states that the iliac output is more solid than it has been in the past      Review of Systems   Constitutional: Negative.    Gastrointestinal:        Drain is clearing, less ileostomy output       Objective:      Physical Exam   Constitutional: No distress.   Chronically ill   Cardiovascular: Normal rate.   Pulmonary/Chest: Effort normal and breath sounds normal.   Abdominal: Soft. Bowel sounds are normal.   Midline incision is healed.  Well-formed left upper quadrant ileostomy.  Colostomy site is healing well. Drain in place with serous output.  It flushed with the   Vitals reviewed.      Assessment:      drain output is no longer purulent.  He is 2 months out from surgery. Ileostomy is becoming more formed      Plan:       Check labs, continue Lomotil.  Gastrografin enema in January.  Follow-up after that to discuss ileostomy reversal or need for any additional surgery    12/28/2018.  Sodium was 117.  The patient will need to be admitted for IV fluids.  He was contacted and observation status arrange

## 2018-12-27 NOTE — PATIENT INSTRUCTIONS
We need to repeat her labs today.    We will schedule a Gastrografin enema or exam through your backside to see if the area where we clear colon back together has healed    Please keep taking the Lomotil before each meal and before you go to bed. Please continue to monitor the drain and ileostomy output

## 2018-12-28 ENCOUNTER — HOSPITAL ENCOUNTER (INPATIENT)
Facility: HOSPITAL | Age: 63
LOS: 6 days | Discharge: HOME HEALTH | DRG: 641 | End: 2019-01-04
Attending: SURGERY | Admitting: SURGERY
Payer: MEDICARE

## 2018-12-28 ENCOUNTER — TELEPHONE (OUTPATIENT)
Dept: SURGERY | Facility: HOSPITAL | Age: 63
End: 2018-12-28

## 2018-12-28 DIAGNOSIS — E87.5 HYPERKALEMIA: ICD-10-CM

## 2018-12-28 DIAGNOSIS — I50.22 CHRONIC SYSTOLIC HEART FAILURE: ICD-10-CM

## 2018-12-28 DIAGNOSIS — E87.1 HYPONATREMIA: ICD-10-CM

## 2018-12-28 DIAGNOSIS — E87.20 METABOLIC ACIDOSIS: ICD-10-CM

## 2018-12-28 DIAGNOSIS — E11.59 HYPERTENSION ASSOCIATED WITH DIABETES: Primary | ICD-10-CM

## 2018-12-28 DIAGNOSIS — I27.20 PULMONARY HTN: ICD-10-CM

## 2018-12-28 DIAGNOSIS — Z98.890 H/O ILEOSTOMY: ICD-10-CM

## 2018-12-28 DIAGNOSIS — Z93.2 ILEOSTOMY IN PLACE: ICD-10-CM

## 2018-12-28 DIAGNOSIS — I25.5 ISCHEMIC CARDIOMYOPATHY: ICD-10-CM

## 2018-12-28 DIAGNOSIS — I15.2 HYPERTENSION ASSOCIATED WITH DIABETES: Primary | ICD-10-CM

## 2018-12-28 LAB
POCT GLUCOSE: 127 MG/DL (ref 70–110)
POCT GLUCOSE: 139 MG/DL (ref 70–110)

## 2018-12-28 PROCEDURE — G0379 DIRECT REFER HOSPITAL OBSERV: HCPCS

## 2018-12-28 PROCEDURE — G0378 HOSPITAL OBSERVATION PER HR: HCPCS

## 2018-12-28 PROCEDURE — 63600175 PHARM REV CODE 636 W HCPCS: Performed by: SURGERY

## 2018-12-28 PROCEDURE — 25000003 PHARM REV CODE 250: Performed by: SURGERY

## 2018-12-28 RX ORDER — ENOXAPARIN SODIUM 100 MG/ML
30 INJECTION SUBCUTANEOUS EVERY 24 HOURS
Status: DISCONTINUED | OUTPATIENT
Start: 2018-12-28 | End: 2018-12-30

## 2018-12-28 RX ORDER — IBUPROFEN 200 MG
16 TABLET ORAL
Status: CANCELLED | OUTPATIENT
Start: 2018-12-28

## 2018-12-28 RX ORDER — LISINOPRIL 5 MG/1
5 TABLET ORAL EVERY MORNING
Status: DISCONTINUED | OUTPATIENT
Start: 2018-12-28 | End: 2018-12-31

## 2018-12-28 RX ORDER — SODIUM CHLORIDE 0.9 % (FLUSH) 0.9 %
3 SYRINGE (ML) INJECTION
Status: CANCELLED | OUTPATIENT
Start: 2018-12-28

## 2018-12-28 RX ORDER — GLUCAGON 1 MG
1 KIT INJECTION
Status: DISCONTINUED | OUTPATIENT
Start: 2018-12-28 | End: 2019-01-04 | Stop reason: HOSPADM

## 2018-12-28 RX ORDER — BUMETANIDE 2 MG/1
2 TABLET ORAL EVERY MORNING
Status: DISCONTINUED | OUTPATIENT
Start: 2018-12-28 | End: 2018-12-31

## 2018-12-28 RX ORDER — RAMELTEON 8 MG/1
8 TABLET ORAL NIGHTLY PRN
Status: CANCELLED | OUTPATIENT
Start: 2018-12-28

## 2018-12-28 RX ORDER — ACETAMINOPHEN 325 MG/1
650 TABLET ORAL EVERY 8 HOURS PRN
Status: CANCELLED | OUTPATIENT
Start: 2018-12-28

## 2018-12-28 RX ORDER — SODIUM CHLORIDE 9 MG/ML
INJECTION, SOLUTION INTRAVENOUS CONTINUOUS
Status: CANCELLED | OUTPATIENT
Start: 2018-12-28

## 2018-12-28 RX ORDER — IBUPROFEN 200 MG
24 TABLET ORAL
Status: DISCONTINUED | OUTPATIENT
Start: 2018-12-28 | End: 2019-01-04 | Stop reason: HOSPADM

## 2018-12-28 RX ORDER — ONDANSETRON 8 MG/1
8 TABLET, ORALLY DISINTEGRATING ORAL EVERY 8 HOURS PRN
Status: DISCONTINUED | OUTPATIENT
Start: 2018-12-28 | End: 2019-01-04 | Stop reason: HOSPADM

## 2018-12-28 RX ORDER — ACETAMINOPHEN 325 MG/1
650 TABLET ORAL EVERY 8 HOURS PRN
Status: DISCONTINUED | OUTPATIENT
Start: 2018-12-28 | End: 2019-01-04 | Stop reason: HOSPADM

## 2018-12-28 RX ORDER — INSULIN ASPART 100 [IU]/ML
1-10 INJECTION, SOLUTION INTRAVENOUS; SUBCUTANEOUS
Status: CANCELLED | OUTPATIENT
Start: 2018-12-28

## 2018-12-28 RX ORDER — IBUPROFEN 200 MG
24 TABLET ORAL
Status: CANCELLED | OUTPATIENT
Start: 2018-12-28

## 2018-12-28 RX ORDER — INSULIN ASPART 100 [IU]/ML
1-10 INJECTION, SOLUTION INTRAVENOUS; SUBCUTANEOUS
Status: DISCONTINUED | OUTPATIENT
Start: 2018-12-28 | End: 2019-01-04 | Stop reason: HOSPADM

## 2018-12-28 RX ORDER — HYDROCODONE BITARTRATE AND ACETAMINOPHEN 5; 325 MG/1; MG/1
1 TABLET ORAL EVERY 6 HOURS PRN
Status: DISCONTINUED | OUTPATIENT
Start: 2018-12-28 | End: 2019-01-04 | Stop reason: HOSPADM

## 2018-12-28 RX ORDER — IBUPROFEN 200 MG
16 TABLET ORAL
Status: DISCONTINUED | OUTPATIENT
Start: 2018-12-28 | End: 2019-01-04 | Stop reason: HOSPADM

## 2018-12-28 RX ORDER — RAMELTEON 8 MG/1
8 TABLET ORAL NIGHTLY PRN
Status: DISCONTINUED | OUTPATIENT
Start: 2018-12-28 | End: 2019-01-04 | Stop reason: HOSPADM

## 2018-12-28 RX ORDER — SIMVASTATIN 5 MG/1
10 TABLET, FILM COATED ORAL NIGHTLY
Status: DISCONTINUED | OUTPATIENT
Start: 2018-12-28 | End: 2019-01-04 | Stop reason: HOSPADM

## 2018-12-28 RX ORDER — ALLOPURINOL 100 MG/1
100 TABLET ORAL DAILY
Status: DISCONTINUED | OUTPATIENT
Start: 2018-12-28 | End: 2019-01-04 | Stop reason: HOSPADM

## 2018-12-28 RX ORDER — GLUCAGON 1 MG
1 KIT INJECTION
Status: CANCELLED | OUTPATIENT
Start: 2018-12-28

## 2018-12-28 RX ORDER — LIDOCAINE HYDROCHLORIDE 10 MG/ML
1 INJECTION, SOLUTION EPIDURAL; INFILTRATION; INTRACAUDAL; PERINEURAL ONCE
Status: DISCONTINUED | OUTPATIENT
Start: 2018-12-28 | End: 2019-01-04 | Stop reason: HOSPADM

## 2018-12-28 RX ORDER — SODIUM CHLORIDE 9 MG/ML
INJECTION, SOLUTION INTRAVENOUS CONTINUOUS
Status: DISCONTINUED | OUTPATIENT
Start: 2018-12-28 | End: 2018-12-31

## 2018-12-28 RX ORDER — SODIUM CHLORIDE 0.9 % (FLUSH) 0.9 %
3 SYRINGE (ML) INJECTION
Status: DISCONTINUED | OUTPATIENT
Start: 2018-12-28 | End: 2019-01-04 | Stop reason: HOSPADM

## 2018-12-28 RX ORDER — DIPHENHYDRAMINE HYDROCHLORIDE 50 MG/ML
25 INJECTION INTRAMUSCULAR; INTRAVENOUS EVERY 4 HOURS PRN
Status: CANCELLED | OUTPATIENT
Start: 2018-12-28

## 2018-12-28 RX ORDER — DIPHENHYDRAMINE HYDROCHLORIDE 50 MG/ML
25 INJECTION INTRAMUSCULAR; INTRAVENOUS EVERY 4 HOURS PRN
Status: DISCONTINUED | OUTPATIENT
Start: 2018-12-28 | End: 2018-12-28 | Stop reason: RX

## 2018-12-28 RX ORDER — ONDANSETRON 4 MG/1
8 TABLET, ORALLY DISINTEGRATING ORAL EVERY 8 HOURS PRN
Status: CANCELLED | OUTPATIENT
Start: 2018-12-28

## 2018-12-28 RX ORDER — PANTOPRAZOLE SODIUM 40 MG/1
40 TABLET, DELAYED RELEASE ORAL DAILY
Status: DISCONTINUED | OUTPATIENT
Start: 2018-12-28 | End: 2019-01-04 | Stop reason: HOSPADM

## 2018-12-28 RX ORDER — DIPHENOXYLATE HYDROCHLORIDE AND ATROPINE SULFATE 2.5; .025 MG/1; MG/1
1 TABLET ORAL 4 TIMES DAILY
Status: DISCONTINUED | OUTPATIENT
Start: 2018-12-28 | End: 2019-01-04 | Stop reason: HOSPADM

## 2018-12-28 RX ORDER — LIDOCAINE HYDROCHLORIDE 10 MG/ML
1 INJECTION, SOLUTION EPIDURAL; INFILTRATION; INTRACAUDAL; PERINEURAL ONCE
Status: CANCELLED | OUTPATIENT
Start: 2018-12-28 | End: 2018-12-28

## 2018-12-28 RX ORDER — ENOXAPARIN SODIUM 100 MG/ML
30 INJECTION SUBCUTANEOUS EVERY 24 HOURS
Status: CANCELLED | OUTPATIENT
Start: 2018-12-28

## 2018-12-28 RX ADMIN — DIPHENOXYLATE HYDROCHLORIDE AND ATROPINE SULFATE 1 TABLET: 2.5; .025 TABLET ORAL at 04:12

## 2018-12-28 RX ADMIN — SIMVASTATIN 10 MG: 5 TABLET, FILM COATED ORAL at 09:12

## 2018-12-28 RX ADMIN — ENOXAPARIN SODIUM 30 MG: 100 INJECTION SUBCUTANEOUS at 04:12

## 2018-12-28 RX ADMIN — SODIUM CHLORIDE: 0.9 INJECTION, SOLUTION INTRAVENOUS at 02:12

## 2018-12-28 RX ADMIN — DIPHENOXYLATE HYDROCHLORIDE AND ATROPINE SULFATE 1 TABLET: 2.5; .025 TABLET ORAL at 09:12

## 2018-12-28 RX ADMIN — BIMATOPROST 1 DROP: 0.1 SOLUTION/ DROPS OPHTHALMIC at 09:12

## 2018-12-28 NOTE — H&P
Patient ID: Yan Choudhury is a 63 y.o. male.     Anastomotic leak with protective loop ileostomy     Chief Complaint: Post-op Evaluation     Patient underwent a colostomy reversal.  He had a leak at the anastomosis. A protective ileostomy was formed.  He has a drain in place that now has serous output.  He has had high is ileostomy outputs in the past requiring admission and rehydration.  He states that the iliac output is more solid than it has been in the past        Review of Systems   Constitutional: Negative.    Gastrointestinal:        Drain is clearing, less ileostomy output       Objective:   Physical Exam   Constitutional: No distress.   Chronically ill   Cardiovascular: Normal rate.   Pulmonary/Chest: Effort normal and breath sounds normal.   Abdominal: Soft. Bowel sounds are normal.   Midline incision is healed.  Well-formed left upper quadrant ileostomy.  Colostomy site is healing well. Drain in place with serous output.  It flushed with the   Vitals reviewed.      Assessment:      drain output is no longer purulent.  He is 2 months out from surgery. Ileostomy is becoming more formed      Plan:       Check labs, continue Lomotil.  Gastrografin enema in January.  Follow-up after that to discuss ileostomy reversal or need for any additional surgery     12/28/2018.  Sodium was 117.  The patient will need to be admitted for IV fluids.  He was contacted and observation status arrange

## 2018-12-28 NOTE — TELEPHONE ENCOUNTER
Patient has ileostomy.  His sodium was 117.  The patient's wife was contacted and we discussed admission to the hospital.  They will be directly admitted.  The house supervisor is to call them when a bed is available.  Sodium will be replenished

## 2018-12-28 NOTE — PLAN OF CARE
Problem: Adult Inpatient Plan of Care  Goal: Plan of Care Review  Outcome: Ongoing (interventions implemented as appropriate)  Patient remains free of falls and safety precautions maintained. Afebrile. No complaints of pain. IV fluids per order. Ileostomy and urine straict I&O's. KINDRA drain flushed per shift. Will continue to monitor. 12 hour chart check.

## 2018-12-29 PROBLEM — E87.5 HYPERKALEMIA: Status: ACTIVE | Noted: 2018-12-29

## 2018-12-29 LAB
ANION GAP SERPL CALC-SCNC: 8 MMOL/L
BUN SERPL-MCNC: 20 MG/DL
CALCIUM SERPL-MCNC: 8.8 MG/DL
CHLORIDE SERPL-SCNC: 100 MMOL/L
CO2 SERPL-SCNC: 10 MMOL/L
CREAT SERPL-MCNC: 1.7 MG/DL
EST. GFR  (AFRICAN AMERICAN): 48 ML/MIN/1.73 M^2
EST. GFR  (NON AFRICAN AMERICAN): 41 ML/MIN/1.73 M^2
GLUCOSE SERPL-MCNC: 109 MG/DL
POCT GLUCOSE: 122 MG/DL (ref 70–110)
POCT GLUCOSE: 125 MG/DL (ref 70–110)
POCT GLUCOSE: 142 MG/DL (ref 70–110)
POTASSIUM SERPL-SCNC: 5.1 MMOL/L
SODIUM SERPL-SCNC: 118 MMOL/L

## 2018-12-29 PROCEDURE — 80048 BASIC METABOLIC PNL TOTAL CA: CPT

## 2018-12-29 PROCEDURE — 96372 THER/PROPH/DIAG INJ SC/IM: CPT

## 2018-12-29 PROCEDURE — 25000003 PHARM REV CODE 250: Performed by: SURGERY

## 2018-12-29 PROCEDURE — 63600175 PHARM REV CODE 636 W HCPCS: Performed by: SURGERY

## 2018-12-29 PROCEDURE — 36415 COLL VENOUS BLD VENIPUNCTURE: CPT

## 2018-12-29 PROCEDURE — 11000001 HC ACUTE MED/SURG PRIVATE ROOM

## 2018-12-29 RX ADMIN — DIPHENOXYLATE HYDROCHLORIDE AND ATROPINE SULFATE 1 TABLET: 2.5; .025 TABLET ORAL at 08:12

## 2018-12-29 RX ADMIN — ALLOPURINOL 100 MG: 100 TABLET ORAL at 08:12

## 2018-12-29 RX ADMIN — BUMETANIDE 2 MG: 1 TABLET ORAL at 06:12

## 2018-12-29 RX ADMIN — LISINOPRIL 5 MG: 5 TABLET ORAL at 06:12

## 2018-12-29 RX ADMIN — DIPHENOXYLATE HYDROCHLORIDE AND ATROPINE SULFATE 1 TABLET: 2.5; .025 TABLET ORAL at 09:12

## 2018-12-29 RX ADMIN — SIMVASTATIN 10 MG: 5 TABLET, FILM COATED ORAL at 09:12

## 2018-12-29 RX ADMIN — DIPHENOXYLATE HYDROCHLORIDE AND ATROPINE SULFATE 1 TABLET: 2.5; .025 TABLET ORAL at 04:12

## 2018-12-29 RX ADMIN — DIPHENOXYLATE HYDROCHLORIDE AND ATROPINE SULFATE 1 TABLET: 2.5; .025 TABLET ORAL at 01:12

## 2018-12-29 RX ADMIN — SODIUM CHLORIDE: 0.9 INJECTION, SOLUTION INTRAVENOUS at 11:12

## 2018-12-29 RX ADMIN — BIMATOPROST 1 DROP: 0.1 SOLUTION/ DROPS OPHTHALMIC at 09:12

## 2018-12-29 RX ADMIN — PANTOPRAZOLE SODIUM 40 MG: 40 TABLET, DELAYED RELEASE ORAL at 08:12

## 2018-12-29 RX ADMIN — PSYLLIUM HUSK 6 G: 6 GRANULE ORAL at 08:12

## 2018-12-29 RX ADMIN — ENOXAPARIN SODIUM 30 MG: 100 INJECTION SUBCUTANEOUS at 04:12

## 2018-12-29 NOTE — SUBJECTIVE & OBJECTIVE
Interval History:  Feeling well.  Ileostomy output is semi solid    Medications:  Continuous Infusions:   sodium chloride 0.9% 100 mL/hr at 12/29/18 1114     Scheduled Meds:   allopurinol  100 mg Oral Daily    bimatoprost  1 drop Left Eye QHS    bumetanide  2 mg Oral QAM    diphenoxylate-atropine 2.5-0.025 mg  1 tablet Oral QID    enoxaparin  30 mg Subcutaneous Daily    lidocaine (PF) 10 mg/ml (1%)  1 mL Other Once    lisinopril  5 mg Oral QAM    pantoprazole  40 mg Oral Daily    psyllium husk  1 packet Oral Daily    simvastatin  10 mg Oral QHS     PRN Meds:acetaminophen, dextrose 50%, dextrose 50%, glucagon (human recombinant), glucose, glucose, HYDROcodone-acetaminophen, insulin aspart U-100, ondansetron, promethazine (PHENERGAN) IVPB, ramelteon, sodium chloride 0.9%     Review of patient's allergies indicates:  No Known Allergies  Objective:     Vital Signs (Most Recent):  Temp: 97.8 °F (36.6 °C) (12/29/18 0726)  Pulse: 90 (12/29/18 0726)  Resp: 16 (12/29/18 0726)  BP: 95/64 (12/29/18 0726)  SpO2: 100 % (12/29/18 0726) Vital Signs (24h Range):  Temp:  [97.5 °F (36.4 °C)-98.5 °F (36.9 °C)] 97.8 °F (36.6 °C)  Pulse:  [75-90] 90  Resp:  [16-20] 16  SpO2:  [99 %-100 %] 100 %  BP: ()/(53-67) 95/64     Weight: 82.1 kg (180 lb 16 oz)  Body mass index is 27.52 kg/m².    Intake/Output - Last 3 Shifts       12/27 0700 - 12/28 0659 12/28 0700 - 12/29 0659 12/29 0700 - 12/30 0659    P.O.  240 360    Total Intake(mL/kg)  240 (2.9) 360 (4.4)    Urine (mL/kg/hr)  275 975 (2.7)    Drains  10     Stool  1025 325    Total Output  1310 1300    Net  -1070 -940                 Physical Exam   Constitutional:   Chronically ill   HENT:   Head: Normocephalic and atraumatic.   Neck: Neck supple.   Cardiovascular: Normal rate.   Pulmonary/Chest: Effort normal and breath sounds normal.   Abdominal: Soft. Bowel sounds are normal.   Midline incision is healed.  Drain in place with serosanguineous output.  Well-formed right  upper quadrant loop ileostomy   Skin: Skin is warm and dry.   Psychiatric: He has a normal mood and affect. His behavior is normal. Judgment and thought content normal.   Vitals reviewed.      Significant Labs:  CBC:   Recent Labs   Lab 12/27/18  1455   WBC 9.45   RBC 4.32*   HGB 11.7*   HCT 36.4*      MCV 84   MCH 27.1   MCHC 32.1     BMP:   Recent Labs   Lab 12/29/18  0416      *   K 5.1      CO2 10*   BUN 20   CREATININE 1.7*   CALCIUM 8.8     CMP:   Recent Labs   Lab 12/27/18  1455 12/29/18  0416   * 109   CALCIUM 9.6 8.8   ALBUMIN 3.4*  --    PROT 7.8  --    * 118*   K 5.5* 5.1   CO2 15* 10*   CL 94* 100   BUN 19 20   CREATININE 2.0* 1.7*   ALKPHOS 96  --    ALT 14  --    AST 30  --    BILITOT 0.9  --        Significant Diagnostics:  None

## 2018-12-29 NOTE — PROGRESS NOTES
[facility]  General Surgery  Progress Note    Subjective:     History of Present Illness:  Patient underwent a colostomy reversal.  He had a leak at the anastomosis. A protective ileostomy was formed.  He has a drain in place that now has serous output.  He has had high is ileostomy outputs in the past requiring admission and rehydration.  He states that the iliac output is more solid than it has been in the past    Patient's sodium was found to be 119.  He was admitted for rehydration and correction of hyponatremia    Post-Op Info:  * No surgery found *         No new subjective & objective note has been filed under this hospital service since the last note was generated.    Assessment/Plan:     Hyponatremia    Minimal improvement.  Continue slow rehydration with normal saline.  A.m. labs.  Changed to inpatient     Ileostomy in place    Monitor ileostomy output  Continue Lomotil will stop the Metamucil see if this makes a difference in output     Gastroesophageal reflux disease    PPI     Biventricular ICD (implantable cardioverter-defibrillator) in place    Monitor     Pulmonary HTN    Monitor     Ischemic cardiomyopathy    Monitor     Hypertension associated with diabetes    Home medication         Kevin Lindsay MD  General Surgery  [facility]

## 2018-12-29 NOTE — HOSPITAL COURSE
Patient is feeling well as tolerating a diet.  His sodium is minimally improved    12/30/2018.  Patient is feeling better.  Sodium is improving.  Minimal drain output.  Ileostomy 2.1 L (Metamucil was being held)    1231.  Sodium is still 125 pill ordered ileostomy was 1.2 L off Metamucil.  No significant pain    01/01/2019: Sodium improved to 132 today. Renal consulted yesterday and recs inputed. Tolerating diet with ostomy output of 1.0L.     01/02/2019.  Off Metamucil the ileostomy output is approximately 1 L.  Sodium is nearly normal\  01/03/2019.  Ileostomy output 1.2 L.  It is semi solid.  Sodium is near normal.  Potassium is 6, will defer to nephrology  1/4/19:  Ileostomy output, but given Kayexalate, other wise  Ileostomy output was about 1 l for last 2 day, potassium imporved

## 2018-12-29 NOTE — HPI
Patient underwent a colostomy reversal.  He had a leak at the anastomosis. A protective ileostomy was formed.  He has a drain in place that now has serous output.  He has had high is ileostomy outputs in the past requiring admission and rehydration.  He states that the iliac output is more solid than it has been in the past    Patient's sodium was found to be 119.  He was admitted for rehydration and correction of hyponatremia

## 2018-12-29 NOTE — PLAN OF CARE
Problem: Adult Inpatient Plan of Care  Goal: Plan of Care Review  Outcome: Ongoing (interventions implemented as appropriate)  Patient remains free of falls and safety precautions maintained. Afebrile. No complaints of pain. IV fluids per order. BG monitored. Strict I&O. Will continue to monitor. 12 hour chart check.

## 2018-12-29 NOTE — ASSESSMENT & PLAN NOTE
Minimal improvement.  Continue slow rehydration with normal saline.  A.m. labs.  Changed to inpatient

## 2018-12-29 NOTE — ASSESSMENT & PLAN NOTE
Monitor ileostomy output  Continue Lomotil will stop the Metamucil see if this makes a difference in output

## 2018-12-29 NOTE — PROGRESS NOTES
[facility] -   General Surgery  Progress Note    Subjective:     History of Present Illness:  Patient underwent a colostomy reversal.  He had a leak at the anastomosis. A protective ileostomy was formed.  He has a drain in place that now has serous output.  He has had high is ileostomy outputs in the past requiring admission and rehydration.  He states that the iliac output is more solid than it has been in the past    Patient's sodium was found to be 119.  He was admitted for rehydration and correction of hyponatremia    Post-Op Info:  * No surgery found *         Interval History:  Feeling well.  Ileostomy output is semi solid    Medications:  Continuous Infusions:   sodium chloride 0.9% 100 mL/hr at 12/29/18 1114     Scheduled Meds:   allopurinol  100 mg Oral Daily    bimatoprost  1 drop Left Eye QHS    bumetanide  2 mg Oral QAM    diphenoxylate-atropine 2.5-0.025 mg  1 tablet Oral QID    enoxaparin  30 mg Subcutaneous Daily    lidocaine (PF) 10 mg/ml (1%)  1 mL Other Once    lisinopril  5 mg Oral QAM    pantoprazole  40 mg Oral Daily    psyllium husk  1 packet Oral Daily    simvastatin  10 mg Oral QHS     PRN Meds:acetaminophen, dextrose 50%, dextrose 50%, glucagon (human recombinant), glucose, glucose, HYDROcodone-acetaminophen, insulin aspart U-100, ondansetron, promethazine (PHENERGAN) IVPB, ramelteon, sodium chloride 0.9%     Review of patient's allergies indicates:  No Known Allergies  Objective:     Vital Signs (Most Recent):  Temp: 97.8 °F (36.6 °C) (12/29/18 0726)  Pulse: 90 (12/29/18 0726)  Resp: 16 (12/29/18 0726)  BP: 95/64 (12/29/18 0726)  SpO2: 100 % (12/29/18 0726) Vital Signs (24h Range):  Temp:  [97.5 °F (36.4 °C)-98.5 °F (36.9 °C)] 97.8 °F (36.6 °C)  Pulse:  [75-90] 90  Resp:  [16-20] 16  SpO2:  [99 %-100 %] 100 %  BP: ()/(53-67) 95/64     Weight: 82.1 kg (180 lb 16 oz)  Body mass index is 27.52 kg/m².    Intake/Output - Last 3 Shifts       12/27 0700 - 12/28 0659  12/28 0700 - 12/29 0659 12/29 0700 - 12/30 0659    P.O.  240 360    Total Intake(mL/kg)  240 (2.9) 360 (4.4)    Urine (mL/kg/hr)  275 975 (2.7)    Drains  10     Stool  1025 325    Total Output  1310 1300    Net  -1070 -940                 Physical Exam   Constitutional:   Chronically ill   HENT:   Head: Normocephalic and atraumatic.   Neck: Neck supple.   Cardiovascular: Normal rate.   Pulmonary/Chest: Effort normal and breath sounds normal.   Abdominal: Soft. Bowel sounds are normal.   Midline incision is healed.  Drain in place with serosanguineous output.  Well-formed right upper quadrant loop ileostomy   Skin: Skin is warm and dry.   Psychiatric: He has a normal mood and affect. His behavior is normal. Judgment and thought content normal.   Vitals reviewed.      Significant Labs:  CBC:   Recent Labs   Lab 12/27/18  1455   WBC 9.45   RBC 4.32*   HGB 11.7*   HCT 36.4*      MCV 84   MCH 27.1   MCHC 32.1     BMP:   Recent Labs   Lab 12/29/18  0416      *   K 5.1      CO2 10*   BUN 20   CREATININE 1.7*   CALCIUM 8.8     CMP:   Recent Labs   Lab 12/27/18  1455 12/29/18  0416   * 109   CALCIUM 9.6 8.8   ALBUMIN 3.4*  --    PROT 7.8  --    * 118*   K 5.5* 5.1   CO2 15* 10*   CL 94* 100   BUN 19 20   CREATININE 2.0* 1.7*   ALKPHOS 96  --    ALT 14  --    AST 30  --    BILITOT 0.9  --        Significant Diagnostics:  None    Assessment/Plan:     Hyponatremia    Minimal improvement.  Continue slow rehydration with normal saline.  A.m. labs.  Changed to inpatient     Ileostomy in place    Monitor ileostomy output  Continue Lomotil will stop the Metamucil see if this makes a difference in output     Gastroesophageal reflux disease    PPI     Biventricular ICD (implantable cardioverter-defibrillator) in place    Monitor     Pulmonary HTN    Monitor     Ischemic cardiomyopathy    Monitor     Hypertension associated with diabetes    Home medication         Kevin Lindsay MD  General  Surgery  [facility]

## 2018-12-29 NOTE — PLAN OF CARE
Problem: Adult Inpatient Plan of Care  Goal: Plan of Care Review  Patient remained free from injury. Blood glucose monitored. KINDRA drain maintained. No s/s of acute distress. Will continue to monitor.Critical value of Sodium=118.

## 2018-12-30 LAB
ANION GAP SERPL CALC-SCNC: 9 MMOL/L
BUN SERPL-MCNC: 19 MG/DL
CALCIUM SERPL-MCNC: 9.2 MG/DL
CHLORIDE SERPL-SCNC: 105 MMOL/L
CO2 SERPL-SCNC: 11 MMOL/L
CREAT SERPL-MCNC: 1.7 MG/DL
EST. GFR  (AFRICAN AMERICAN): 48 ML/MIN/1.73 M^2
EST. GFR  (NON AFRICAN AMERICAN): 41 ML/MIN/1.73 M^2
GLUCOSE SERPL-MCNC: 92 MG/DL
MAGNESIUM SERPL-MCNC: 1.3 MG/DL
PHOSPHATE SERPL-MCNC: 2.8 MG/DL
POCT GLUCOSE: 106 MG/DL (ref 70–110)
POCT GLUCOSE: 111 MG/DL (ref 70–110)
POCT GLUCOSE: 120 MG/DL (ref 70–110)
POTASSIUM SERPL-SCNC: 5.3 MMOL/L
SODIUM SERPL-SCNC: 125 MMOL/L

## 2018-12-30 PROCEDURE — 25000003 PHARM REV CODE 250: Performed by: SURGERY

## 2018-12-30 PROCEDURE — 83735 ASSAY OF MAGNESIUM: CPT

## 2018-12-30 PROCEDURE — 80048 BASIC METABOLIC PNL TOTAL CA: CPT

## 2018-12-30 PROCEDURE — 63600175 PHARM REV CODE 636 W HCPCS: Performed by: SURGERY

## 2018-12-30 PROCEDURE — 84100 ASSAY OF PHOSPHORUS: CPT

## 2018-12-30 PROCEDURE — 36415 COLL VENOUS BLD VENIPUNCTURE: CPT

## 2018-12-30 PROCEDURE — 96372 THER/PROPH/DIAG INJ SC/IM: CPT

## 2018-12-30 PROCEDURE — 11000001 HC ACUTE MED/SURG PRIVATE ROOM

## 2018-12-30 RX ORDER — ENOXAPARIN SODIUM 100 MG/ML
40 INJECTION SUBCUTANEOUS EVERY 24 HOURS
Status: DISCONTINUED | OUTPATIENT
Start: 2018-12-30 | End: 2019-01-04 | Stop reason: HOSPADM

## 2018-12-30 RX ADMIN — SIMVASTATIN 10 MG: 5 TABLET, FILM COATED ORAL at 09:12

## 2018-12-30 RX ADMIN — PANTOPRAZOLE SODIUM 40 MG: 40 TABLET, DELAYED RELEASE ORAL at 08:12

## 2018-12-30 RX ADMIN — DIPHENOXYLATE HYDROCHLORIDE AND ATROPINE SULFATE 1 TABLET: 2.5; .025 TABLET ORAL at 08:12

## 2018-12-30 RX ADMIN — DIPHENOXYLATE HYDROCHLORIDE AND ATROPINE SULFATE 1 TABLET: 2.5; .025 TABLET ORAL at 06:12

## 2018-12-30 RX ADMIN — ENOXAPARIN SODIUM 40 MG: 100 INJECTION SUBCUTANEOUS at 06:12

## 2018-12-30 RX ADMIN — BUMETANIDE 2 MG: 1 TABLET ORAL at 06:12

## 2018-12-30 RX ADMIN — BIMATOPROST 1 DROP: 0.1 SOLUTION/ DROPS OPHTHALMIC at 09:12

## 2018-12-30 RX ADMIN — LISINOPRIL 5 MG: 5 TABLET ORAL at 06:12

## 2018-12-30 RX ADMIN — DIPHENOXYLATE HYDROCHLORIDE AND ATROPINE SULFATE 1 TABLET: 2.5; .025 TABLET ORAL at 09:12

## 2018-12-30 RX ADMIN — SODIUM CHLORIDE: 0.9 INJECTION, SOLUTION INTRAVENOUS at 12:12

## 2018-12-30 RX ADMIN — ALLOPURINOL 100 MG: 100 TABLET ORAL at 08:12

## 2018-12-30 RX ADMIN — DIPHENOXYLATE HYDROCHLORIDE AND ATROPINE SULFATE 1 TABLET: 2.5; .025 TABLET ORAL at 02:12

## 2018-12-30 RX ADMIN — SODIUM CHLORIDE: 0.9 INJECTION, SOLUTION INTRAVENOUS at 09:12

## 2018-12-30 NOTE — PROGRESS NOTES
[facility] -   General Surgery  Progress Note    Subjective:     History of Present Illness:  Patient underwent a colostomy reversal.  He had a leak at the anastomosis. A protective ileostomy was formed.  He has a drain in place that now has serous output.  He has had high is ileostomy outputs in the past requiring admission and rehydration.  He states that the iliac output is more solid than it has been in the past    Patient's sodium was found to be 119.  He was admitted for rehydration and correction of hyponatremia    Post-Op Info:  * No surgery found *         Interval History:  The minimum use was being held to monitor ileostomy output.  If it over L we will restarted tomorrow, Monday    Medications:  Continuous Infusions:   sodium chloride 0.9% 100 mL/hr at 12/30/18 1237     Scheduled Meds:   allopurinol  100 mg Oral Daily    bimatoprost  1 drop Left Eye QHS    bumetanide  2 mg Oral QAM    diphenoxylate-atropine 2.5-0.025 mg  1 tablet Oral QID    enoxaparin  30 mg Subcutaneous Daily    lidocaine (PF) 10 mg/ml (1%)  1 mL Other Once    lisinopril  5 mg Oral QAM    pantoprazole  40 mg Oral Daily    simvastatin  10 mg Oral QHS     PRN Meds:acetaminophen, dextrose 50%, dextrose 50%, glucagon (human recombinant), glucose, glucose, HYDROcodone-acetaminophen, insulin aspart U-100, ondansetron, promethazine (PHENERGAN) IVPB, ramelteon, sodium chloride 0.9%     Review of patient's allergies indicates:  No Known Allergies  Objective:     Vital Signs (Most Recent):  Temp: 98 °F (36.7 °C) (12/30/18 0849)  Pulse: 97 (12/30/18 0849)  Resp: 18 (12/30/18 0849)  BP: 107/71 (12/30/18 0849)  SpO2: 100 % (12/30/18 0849) Vital Signs (24h Range):  Temp:  [97.6 °F (36.4 °C)-98.5 °F (36.9 °C)] 98 °F (36.7 °C)  Pulse:  [] 97  Resp:  [16-18] 18  SpO2:  [100 %] 100 %  BP: ()/(59-71) 107/71     Weight: 82.1 kg (180 lb 16 oz)  Body mass index is 27.52 kg/m².    Intake/Output - Last 3 Shifts       12/28  0700 - 12/29 0659 12/29 0700 - 12/30 0659 12/30 0700 - 12/31 0659    P.O. 240 1920     I.V. (mL/kg)  3943.3 (48)     Total Intake(mL/kg) 240 (2.9) 5863.3 (71.4)     Urine (mL/kg/hr) 275 2375 (1.2) 400 (0.9)    Drains 10 10     Stool 1025 2175 200    Total Output 1310 4560 600    Net -1070 +1303.3 -600           Urine Occurrence  1 x           Physical Exam   Constitutional:   Chronically ill   HENT:   Head: Normocephalic.   Eyes: Pupils are equal, round, and reactive to light.   Neck: Normal range of motion. Neck supple.   Cardiovascular: Normal rate.   Pulmonary/Chest: Effort normal.   Abdominal: Soft. Bowel sounds are normal.   Drain in place  Ileostomy with semi solid/liquid output   Skin: Skin is warm.   Vitals reviewed.      Significant Labs:  BMP:   Recent Labs   Lab 12/30/18  0601   GLU 92   *   K 5.3*      CO2 11*   BUN 19   CREATININE 1.7*   CALCIUM 9.2   MG 1.3*       Significant Diagnostics:  None    Assessment/Plan:     Hyperkalemia    Monitor     Hyponatremia    Sodium is showing improvement  Continue slow rehydration with normal saline.  A.m. labs.      Ileostomy in place    Monitor ileostomy output  Continue Lomotil will stop the Metamucil see if this makes a difference in output     Gastroesophageal reflux disease    PPI     Biventricular ICD (implantable cardioverter-defibrillator) in place    Monitor     Pulmonary HTN    Monitor     Ischemic cardiomyopathy    Monitor     Hypertension associated with diabetes    Home medication         Kevin Lindsay MD  General Surgery  [facility]

## 2018-12-30 NOTE — PLAN OF CARE
Assessment completed.  Met with the patient/ family. CM explained and left info in blue transition of care folder regarding Advance Directives, Living Will ( FULL CODE) , Pamphlet on D/C planning on admission and Pharmacy bedside delivery.  The role of CM explained for  transitions of care/ discharge planning. Per EMR, Patient underwent a colostomy reversal.  He had a leak at the anastomosis. A protective ileostomy was formed.  He has a drain in place that now has serous output.  He has had high is ileostomy outputs in the past requiring admission and rehydration.  He states that the iliac output is more solid than it has been in the past    Patient has family support of his spouse , kids.  Patient has Medicare insurance. Patient has no needs at this time. CM to f/u for safe transition    Sandra Estevez MD       [id number] - [place], [place] - [address] AT AT [facility]  [address]  [place] [address]  Phone: [phone] Fax: [phone]         12/30/18 1430   Discharge Assessment   Assessment Type Discharge Planning Assessment   Confirmed/corrected address and phone number on facesheet? Yes   Assessment information obtained from? Patient;Medical Record   Expected Length of Stay (days) (TBD)   Communicated expected length of stay with patient/caregiver no   Prior to hospitilization cognitive status: Alert/Oriented   Prior to hospitalization functional status: Assistive Equipment   Current cognitive status: Alert/Oriented   Current Functional Status: Assistive Equipment   Lives With spouse;child(patrica), adult   Able to Return to Prior Arrangements yes   Is patient able to care for self after discharge? Yes   Patient's perception of discharge disposition home or selfcare   Readmission Within the Last 30 Days current reason for admission unrelated to previous admission   If yes, most recent facility name: ([facility])   Patient currently being followed by outpatient case management? No    Patient currently receives any other outside agency services? No   Equipment Currently Used at Home cane, straight;CPAP;shower chair;glucometer   Do you have any problems affording any of your prescribed medications? No   Is the patient taking medications as prescribed? yes   Does the patient have transportation home? Yes   Transportation Anticipated family or friend will provide   Does the patient receive services at the [facility]? No   Discharge Plan A Home with family   Discharge Plan B Home with family   Patient/Family in Agreement with Plan yes

## 2018-12-30 NOTE — SUBJECTIVE & OBJECTIVE
Interval History:  The minimum use was being held to monitor ileostomy output.  If it over L we will restarted tomorrow, Monday    Medications:  Continuous Infusions:   sodium chloride 0.9% 100 mL/hr at 12/30/18 1237     Scheduled Meds:   allopurinol  100 mg Oral Daily    bimatoprost  1 drop Left Eye QHS    bumetanide  2 mg Oral QAM    diphenoxylate-atropine 2.5-0.025 mg  1 tablet Oral QID    enoxaparin  30 mg Subcutaneous Daily    lidocaine (PF) 10 mg/ml (1%)  1 mL Other Once    lisinopril  5 mg Oral QAM    pantoprazole  40 mg Oral Daily    simvastatin  10 mg Oral QHS     PRN Meds:acetaminophen, dextrose 50%, dextrose 50%, glucagon (human recombinant), glucose, glucose, HYDROcodone-acetaminophen, insulin aspart U-100, ondansetron, promethazine (PHENERGAN) IVPB, ramelteon, sodium chloride 0.9%     Review of patient's allergies indicates:  No Known Allergies  Objective:     Vital Signs (Most Recent):  Temp: 98 °F (36.7 °C) (12/30/18 0849)  Pulse: 97 (12/30/18 0849)  Resp: 18 (12/30/18 0849)  BP: 107/71 (12/30/18 0849)  SpO2: 100 % (12/30/18 0849) Vital Signs (24h Range):  Temp:  [97.6 °F (36.4 °C)-98.5 °F (36.9 °C)] 98 °F (36.7 °C)  Pulse:  [] 97  Resp:  [16-18] 18  SpO2:  [100 %] 100 %  BP: ()/(59-71) 107/71     Weight: 82.1 kg (180 lb 16 oz)  Body mass index is 27.52 kg/m².    Intake/Output - Last 3 Shifts       12/28 0700 - 12/29 0659 12/29 0700 - 12/30 0659 12/30 0700 - 12/31 0659    P.O. 240 1920     I.V. (mL/kg)  3943.3 (48)     Total Intake(mL/kg) 240 (2.9) 5863.3 (71.4)     Urine (mL/kg/hr) 275 2375 (1.2) 400 (0.9)    Drains 10 10     Stool 1025 2175 200    Total Output 1310 4560 600    Net -1070 +1303.3 -600           Urine Occurrence  1 x           Physical Exam   Constitutional:   Chronically ill   HENT:   Head: Normocephalic.   Eyes: Pupils are equal, round, and reactive to light.   Neck: Normal range of motion. Neck supple.   Cardiovascular: Normal rate.   Pulmonary/Chest: Effort  normal.   Abdominal: Soft. Bowel sounds are normal.   Drain in place  Ileostomy with semi solid/liquid output   Skin: Skin is warm.   Vitals reviewed.      Significant Labs:  BMP:   Recent Labs   Lab 12/30/18  0601   GLU 92   *   K 5.3*      CO2 11*   BUN 19   CREATININE 1.7*   CALCIUM 9.2   MG 1.3*       Significant Diagnostics:  None

## 2018-12-30 NOTE — PROGRESS NOTES
Pharmacist Renal Dose Adjustment Note    Yan Choudhury is a 63 y.o. male being treated with the medication Lovenox    Patient Data:    Vital Signs (Most Recent):  Temp: 98 °F (36.7 °C) (12/30/18 1246)  Pulse: 90 (12/30/18 1246)  Resp: 16 (12/30/18 1246)  BP: 103/66 (12/30/18 1246)  SpO2: 100 % (12/30/18 1246) Vital Signs (72h Range):  Temp:  [97.5 °F (36.4 °C)-98.5 °F (36.9 °C)]   Pulse:  []   Resp:  [16-20]   BP: ()/(53-71)   SpO2:  [99 %-100 %]      Recent Labs   Lab 12/27/18  1455 12/29/18  0416 12/30/18  0601   CREATININE 2.0* 1.7* 1.7*     Serum creatinine: 1.7 mg/dL (H) 12/30/18 0601  Estimated creatinine clearance: 45.3 mL/min (A)    Medication: Lovenox 30mg daily will be changed to Lovenox 40mg daily    Pharmacist's Name: Suri Jon  Pharmacist's Extension: [phone]

## 2018-12-30 NOTE — PLAN OF CARE
Problem: Adult Inpatient Plan of Care  Goal: Plan of Care Review  Outcome: Ongoing (interventions implemented as appropriate)  Noacute distress noted. IV fluids infusing. Blood glucose being monitored. Safety measures are in place. Call bell within reach. Pain being managed. Pt free of falls. Will continue to monitor. Chart check complete.

## 2018-12-31 PROBLEM — I50.22 CHRONIC SYSTOLIC HEART FAILURE: Status: ACTIVE | Noted: 2018-03-06

## 2018-12-31 LAB
ANION GAP SERPL CALC-SCNC: 7 MMOL/L
BUN SERPL-MCNC: 17 MG/DL
CALCIUM SERPL-MCNC: 8.7 MG/DL
CHLORIDE SERPL-SCNC: 108 MMOL/L
CO2 SERPL-SCNC: 10 MMOL/L
CREAT SERPL-MCNC: 1.5 MG/DL
EST. GFR  (AFRICAN AMERICAN): 56 ML/MIN/1.73 M^2
EST. GFR  (NON AFRICAN AMERICAN): 48 ML/MIN/1.73 M^2
GLUCOSE SERPL-MCNC: 109 MG/DL
POCT GLUCOSE: 118 MG/DL (ref 70–110)
POCT GLUCOSE: 142 MG/DL (ref 70–110)
POCT GLUCOSE: 167 MG/DL (ref 70–110)
POTASSIUM SERPL-SCNC: 5.3 MMOL/L
SODIUM SERPL-SCNC: 125 MMOL/L
SODIUM SERPL-SCNC: 127 MMOL/L
SODIUM SERPL-SCNC: 128 MMOL/L
SODIUM SERPL-SCNC: 129 MMOL/L
SODIUM UR-SCNC: 63 MMOL/L

## 2018-12-31 PROCEDURE — 83930 ASSAY OF BLOOD OSMOLALITY: CPT

## 2018-12-31 PROCEDURE — 63600175 PHARM REV CODE 636 W HCPCS: Performed by: SURGERY

## 2018-12-31 PROCEDURE — 36415 COLL VENOUS BLD VENIPUNCTURE: CPT

## 2018-12-31 PROCEDURE — 25000003 PHARM REV CODE 250: Performed by: SURGERY

## 2018-12-31 PROCEDURE — 84300 ASSAY OF URINE SODIUM: CPT

## 2018-12-31 PROCEDURE — 83935 ASSAY OF URINE OSMOLALITY: CPT

## 2018-12-31 PROCEDURE — 11000001 HC ACUTE MED/SURG PRIVATE ROOM

## 2018-12-31 PROCEDURE — 25000003 PHARM REV CODE 250: Performed by: INTERNAL MEDICINE

## 2018-12-31 PROCEDURE — 99223 1ST HOSP IP/OBS HIGH 75: CPT | Mod: ,,, | Performed by: INTERNAL MEDICINE

## 2018-12-31 PROCEDURE — 80048 BASIC METABOLIC PNL TOTAL CA: CPT

## 2018-12-31 PROCEDURE — 84295 ASSAY OF SERUM SODIUM: CPT | Mod: 91

## 2018-12-31 PROCEDURE — 99223 PR INITIAL HOSPITAL CARE,LEVL III: ICD-10-PCS | Mod: ,,, | Performed by: INTERNAL MEDICINE

## 2018-12-31 RX ORDER — 3% SODIUM CHLORIDE 3 G/100ML
500 INJECTION, SOLUTION INTRAVENOUS CONTINUOUS
Status: DISCONTINUED | OUTPATIENT
Start: 2018-12-31 | End: 2018-12-31

## 2018-12-31 RX ORDER — BUMETANIDE 1 MG/1
1 TABLET ORAL EVERY MORNING
Status: DISCONTINUED | OUTPATIENT
Start: 2019-01-01 | End: 2018-12-31

## 2018-12-31 RX ORDER — 3% SODIUM CHLORIDE 3 G/100ML
30 INJECTION, SOLUTION INTRAVENOUS CONTINUOUS
Status: DISCONTINUED | OUTPATIENT
Start: 2018-12-31 | End: 2018-12-31

## 2018-12-31 RX ORDER — SODIUM BICARBONATE 650 MG/1
650 TABLET ORAL 3 TIMES DAILY
Status: DISCONTINUED | OUTPATIENT
Start: 2018-12-31 | End: 2019-01-01

## 2018-12-31 RX ADMIN — SODIUM CHLORIDE: 0.9 INJECTION, SOLUTION INTRAVENOUS at 04:12

## 2018-12-31 RX ADMIN — SIMVASTATIN 10 MG: 5 TABLET, FILM COATED ORAL at 09:12

## 2018-12-31 RX ADMIN — LISINOPRIL 5 MG: 5 TABLET ORAL at 06:12

## 2018-12-31 RX ADMIN — ALLOPURINOL 100 MG: 100 TABLET ORAL at 08:12

## 2018-12-31 RX ADMIN — BIMATOPROST 1 DROP: 0.1 SOLUTION/ DROPS OPHTHALMIC at 09:12

## 2018-12-31 RX ADMIN — SODIUM BICARBONATE: 84 INJECTION, SOLUTION INTRAVENOUS at 09:12

## 2018-12-31 RX ADMIN — DIPHENOXYLATE HYDROCHLORIDE AND ATROPINE SULFATE 1 TABLET: 2.5; .025 TABLET ORAL at 09:12

## 2018-12-31 RX ADMIN — DIPHENOXYLATE HYDROCHLORIDE AND ATROPINE SULFATE 1 TABLET: 2.5; .025 TABLET ORAL at 08:12

## 2018-12-31 RX ADMIN — DIPHENOXYLATE HYDROCHLORIDE AND ATROPINE SULFATE 1 TABLET: 2.5; .025 TABLET ORAL at 12:12

## 2018-12-31 RX ADMIN — PANTOPRAZOLE SODIUM 40 MG: 40 TABLET, DELAYED RELEASE ORAL at 08:12

## 2018-12-31 RX ADMIN — DIPHENOXYLATE HYDROCHLORIDE AND ATROPINE SULFATE 1 TABLET: 2.5; .025 TABLET ORAL at 04:12

## 2018-12-31 RX ADMIN — SODIUM CHLORIDE: 0.9 INJECTION, SOLUTION INTRAVENOUS at 08:12

## 2018-12-31 RX ADMIN — SODIUM CHLORIDE 30 ML/HR: 3 INJECTION, SOLUTION INTRAVENOUS at 12:12

## 2018-12-31 RX ADMIN — SODIUM BICARBONATE 650 MG TABLET 650 MG: at 09:12

## 2018-12-31 RX ADMIN — ENOXAPARIN SODIUM 40 MG: 100 INJECTION SUBCUTANEOUS at 04:12

## 2018-12-31 RX ADMIN — BUMETANIDE 2 MG: 1 TABLET ORAL at 06:12

## 2018-12-31 RX ADMIN — SODIUM BICARBONATE 650 MG TABLET 650 MG: at 06:12

## 2018-12-31 NOTE — NURSING
Chart reviewed for diabetes  patient seen by this nurse on recent admit  Glucose stable    No further action at this time

## 2018-12-31 NOTE — PLAN OF CARE
Problem: Adult Inpatient Plan of Care  Goal: Plan of Care Review  Outcome: Ongoing (interventions implemented as appropriate)  Patient remains free of falls and safety precautions maintained. Afebrile. No complaints of pain. Strict I&O. Ileostomy in place. KINDRA drain care per order. IV fluids per order. Will continue to monitor. 12 hour chart check.

## 2018-12-31 NOTE — PROGRESS NOTES
The patient's sodium deficit to replace from 1  was 64 mL of 3% normal saline over 20 hr.  Message in from pharmacy, they were unsatisfied with giving 500 mL of normal saline over 24 hr.  Therefore we will discontinue the hypertonic saline and continue with isotonic saline

## 2018-12-31 NOTE — SUBJECTIVE & OBJECTIVE
Interval History:  Sodium improved to 132 today. Renal consulted yesterday and recs inputed. Tolerating diet with ostomy output of 1.0L.     Medications:  Continuous Infusions:   sodium chloride 0.9% 100 mL/hr at 12/31/18 0857     Scheduled Meds:   allopurinol  100 mg Oral Daily    bimatoprost  1 drop Left Eye QHS    bumetanide  2 mg Oral QAM    diphenoxylate-atropine 2.5-0.025 mg  1 tablet Oral QID    enoxaparin  40 mg Subcutaneous Daily    lidocaine (PF) 10 mg/ml (1%)  1 mL Other Once    lisinopril  5 mg Oral QAM    pantoprazole  40 mg Oral Daily    simvastatin  10 mg Oral QHS     PRN Meds:acetaminophen, dextrose 50%, dextrose 50%, glucagon (human recombinant), glucose, glucose, HYDROcodone-acetaminophen, insulin aspart U-100, ondansetron, promethazine (PHENERGAN) IVPB, ramelteon, sodium chloride 0.9%     Review of patient's allergies indicates:  No Known Allergies  Objective:     Vital Signs (Most Recent):  Temp: 98 °F (36.7 °C) (12/31/18 0703)  Pulse: 97 (12/31/18 0703)  Resp: 16 (12/31/18 0703)  BP: 102/66 (12/31/18 0703)  SpO2: 98 % (12/31/18 0703) Vital Signs (24h Range):  Temp:  [97.4 °F (36.3 °C)-98 °F (36.7 °C)] 98 °F (36.7 °C)  Pulse:  [] 97  Resp:  [14-18] 16  SpO2:  [98 %-100 %] 98 %  BP: (100-117)/(62-69) 102/66     Weight: 82.1 kg (180 lb 16 oz)  Body mass index is 27.52 kg/m².    Intake/Output - Last 3 Shifts       12/29 0700 - 12/30 0659 12/30 0700 - 12/31 0659 12/31 0700 - 01/01 0659    P.O. 1920 1200     I.V. (mL/kg) 3943.3 (48) 1275 (15.5)     Total Intake(mL/kg) 5863.3 (71.4) 2475 (30.1)     Urine (mL/kg/hr) 2375 (1.2) 2000 (1) 400 (1.8)    Drains 10 35     Stool 2175 1250 375    Total Output 4560 3285 775    Net +1303.3 -810 -775           Urine Occurrence 1 x 4 x           Physical Exam   Constitutional: He appears well-developed. No distress.   HENT:   Head: Normocephalic and atraumatic.   Neck: Normal range of motion. Neck supple.   Cardiovascular: Normal rate, regular rhythm  and normal heart sounds.   Pulmonary/Chest: Effort normal and breath sounds normal.   Abdominal: Soft. Bowel sounds are normal. He exhibits no distension. There is no tenderness.   Right upper quadrant ileostomy pink and viable with liquid stool in bag    Drain with serosanguineous output   Vitals reviewed.      Significant Labs:  CBC:   Recent Labs   Lab 12/27/18  1455   WBC 9.45   RBC 4.32*   HGB 11.7*   HCT 36.4*      MCV 84   MCH 27.1   MCHC 32.1     BMP:   Recent Labs   Lab 12/30/18  0601 12/31/18  0503   GLU 92 109   * 125*   K 5.3* 5.3*    108   CO2 11* 10*   BUN 19 17   CREATININE 1.7* 1.5*   CALCIUM 9.2 8.7   MG 1.3*  --      CMP:   Recent Labs   Lab 12/27/18  1455  12/31/18  0503   *   < > 109   CALCIUM 9.6   < > 8.7   ALBUMIN 3.4*  --   --    PROT 7.8  --   --    *   < > 125*   K 5.5*   < > 5.3*   CO2 15*   < > 10*   CL 94*   < > 108   BUN 19   < > 17   CREATININE 2.0*   < > 1.5*   ALKPHOS 96  --   --    ALT 14  --   --    AST 30  --   --    BILITOT 0.9  --   --     < > = values in this interval not displayed.       Significant Diagnostics:  None

## 2018-12-31 NOTE — PLAN OF CARE
12/31/18 1535   Medicare Message   Important Message from Medicare regarding Discharge Appeal Rights Given to patient/caregiver;Explained to patient/caregiver;Signed/date by patient/caregiver   Date IMM was signed 12/31/18   Time IMM was signed [id number]

## 2018-12-31 NOTE — PLAN OF CARE
Problem: Adult Inpatient Plan of Care  Goal: Plan of Care Review  Outcome: Ongoing (interventions implemented as appropriate)  Patient denies pain. Ileostomy bag leaked and had to be changed today. Site cleaned and cavalon barrier ointment was applied. IV fluids maintained. Glucose monitoring. Chart check complete. Will continue to monitor.

## 2018-12-31 NOTE — PLAN OF CARE
Problem: Adult Inpatient Plan of Care  Goal: Plan of Care Review  Outcome: Ongoing (interventions implemented as appropriate)  No acute distress noted. IV fluids infusing. Blood glucose being monitored. Safety measures are in place. Call bell within reach. Pain being managed. Pt free of falls. Will continue to monitor. Chart check complete.

## 2018-12-31 NOTE — PLAN OF CARE
CM met with patient/spouse at the bedside.  Patient would like [facility] resumed at discharge.  Choice form obtained and placed in patient blue folder.  Referral initiated in right care.

## 2018-12-31 NOTE — CONSULTS
"   12/31/18 1205       Ileostomy 10/30/18 Loop RUQ   Placement Date: 10/30/18   Present Prior to Hospital Arrival?: Yes  Inserted by: MD  Ileostomy Type: Loop  Location: RUQ   Stoma Appearance rosebud appearance;round;pink;moist   Appliance 1-piece;intact;no leakage   Stoma Function flatus;stool   Peristomal Assessment Clean;Intact       Wound 12/12/18 Other (comment) upper Abdomen   Date First Assessed: 12/12/18   Pre-existing: Yes  Primary Wound Type: (c) Other (comment)  Side: Left  Orientation: upper  Location: Abdomen   Wound WDL ex   Dressing Appearance Dry;Intact   Drainage Amount Scant   Drainage Characteristics/Odor Serous   Appearance Red;Moist;Granulating   Tissue loss description Full thickness   Red (%), Wound Tissue Color 100 %   Periwound Area Dry;Intact   Wound Edges Open   Wound Length (cm) 0.5 cm   Wound Width (cm) 2.5 cm   Wound Depth (cm) 0.1 cm   Wound Volume (cm^3) 0.12 cm^3   Wound Surface Area (cm^2) 1.25 cm^2   Care Cleansed with:;Sterile normal saline;Applied:;Skin Barrier   Dressing Foam   Dressing Change Due 01/07/19     Consulted on this 64 y/o M patient for Ileostomy. Patient has an established Ileostomy to RUQ that was placed 2 months ago. He wears a one piece pouch that he applies independently. He states he changes pouch twice per week due to leaking. Currently, pouch intact with no leak.  Extra one piece pouch left at bedside.  LLQ old colostomy site also assessed. Wound is healing well and now measures 0.5x2.5x0.1cm with moist red granulating wound bed and scant serous drainage. Foam dressing applied.  LLQ KINDRA drain also noted.  Please see below:    Wound to Old colostomy site LLQ:  1. Cleanse with saline  2. Pat dry  3. Paint sweta wound skin with cavilon  4. Apply foam dressing  5. Change every 3 days and prn    Please review Stevo's procedure "Pouching : Colostomy or Ileostomy" for instructions on ostomy.stoma care. Change ostomy appliance weekly or IMMEDIATELY IF LEAKING. All " ostomy care supplies can be requested from materials management.    OSTOMY CARE SUPPLIES:  One Piece Pouch #[id number]    Cavilon Spray #[id number]

## 2019-01-01 LAB
ANION GAP SERPL CALC-SCNC: 5 MMOL/L
BUN SERPL-MCNC: 16 MG/DL
CALCIUM SERPL-MCNC: 9 MG/DL
CHLORIDE SERPL-SCNC: 111 MMOL/L
CO2 SERPL-SCNC: 13 MMOL/L
CREAT SERPL-MCNC: 1.4 MG/DL
EST. GFR  (AFRICAN AMERICAN): >60 ML/MIN/1.73 M^2
EST. GFR  (NON AFRICAN AMERICAN): 52 ML/MIN/1.73 M^2
GLUCOSE SERPL-MCNC: 114 MG/DL
OSMOLALITY SERPL: 272 MOSM/KG
OSMOLALITY UR: 248 MOSM/KG
POTASSIUM SERPL-SCNC: 5.6 MMOL/L
SODIUM SERPL-SCNC: 129 MMOL/L
SODIUM SERPL-SCNC: 129 MMOL/L
SODIUM SERPL-SCNC: 130 MMOL/L
SODIUM SERPL-SCNC: 132 MMOL/L

## 2019-01-01 PROCEDURE — 11000001 HC ACUTE MED/SURG PRIVATE ROOM

## 2019-01-01 PROCEDURE — 84295 ASSAY OF SERUM SODIUM: CPT

## 2019-01-01 PROCEDURE — 63600175 PHARM REV CODE 636 W HCPCS: Performed by: SURGERY

## 2019-01-01 PROCEDURE — 25000003 PHARM REV CODE 250: Performed by: INTERNAL MEDICINE

## 2019-01-01 PROCEDURE — 99024 PR POST-OP FOLLOW-UP VISIT: ICD-10-PCS | Mod: ,,, | Performed by: COLON & RECTAL SURGERY

## 2019-01-01 PROCEDURE — 80048 BASIC METABOLIC PNL TOTAL CA: CPT

## 2019-01-01 PROCEDURE — 36415 COLL VENOUS BLD VENIPUNCTURE: CPT

## 2019-01-01 PROCEDURE — 99024 POSTOP FOLLOW-UP VISIT: CPT | Mod: ,,, | Performed by: COLON & RECTAL SURGERY

## 2019-01-01 PROCEDURE — 25000003 PHARM REV CODE 250: Performed by: SURGERY

## 2019-01-01 PROCEDURE — 99233 SBSQ HOSP IP/OBS HIGH 50: CPT | Mod: ,,, | Performed by: INTERNAL MEDICINE

## 2019-01-01 PROCEDURE — 84295 ASSAY OF SERUM SODIUM: CPT | Mod: 91

## 2019-01-01 PROCEDURE — 99233 PR SUBSEQUENT HOSPITAL CARE,LEVL III: ICD-10-PCS | Mod: ,,, | Performed by: INTERNAL MEDICINE

## 2019-01-01 RX ORDER — SODIUM BICARBONATE 650 MG/1
1300 TABLET ORAL 3 TIMES DAILY
Status: DISCONTINUED | OUTPATIENT
Start: 2019-01-01 | End: 2019-01-02

## 2019-01-01 RX ADMIN — DIPHENOXYLATE HYDROCHLORIDE AND ATROPINE SULFATE 1 TABLET: 2.5; .025 TABLET ORAL at 06:01

## 2019-01-01 RX ADMIN — SIMVASTATIN 10 MG: 5 TABLET, FILM COATED ORAL at 09:01

## 2019-01-01 RX ADMIN — SODIUM BICARBONATE 650 MG TABLET 650 MG: at 09:01

## 2019-01-01 RX ADMIN — ALLOPURINOL 100 MG: 100 TABLET ORAL at 09:01

## 2019-01-01 RX ADMIN — BIMATOPROST 1 DROP: 0.1 SOLUTION/ DROPS OPHTHALMIC at 09:01

## 2019-01-01 RX ADMIN — PANTOPRAZOLE SODIUM 40 MG: 40 TABLET, DELAYED RELEASE ORAL at 09:01

## 2019-01-01 RX ADMIN — SODIUM BICARBONATE 650 MG TABLET 1300 MG: at 04:01

## 2019-01-01 RX ADMIN — DIPHENOXYLATE HYDROCHLORIDE AND ATROPINE SULFATE 1 TABLET: 2.5; .025 TABLET ORAL at 09:01

## 2019-01-01 RX ADMIN — SODIUM BICARBONATE 650 MG TABLET 1300 MG: at 11:01

## 2019-01-01 RX ADMIN — DIPHENOXYLATE HYDROCHLORIDE AND ATROPINE SULFATE 1 TABLET: 2.5; .025 TABLET ORAL at 12:01

## 2019-01-01 RX ADMIN — SODIUM BICARBONATE 650 MG TABLET 1300 MG: at 09:01

## 2019-01-01 RX ADMIN — ENOXAPARIN SODIUM 40 MG: 100 INJECTION SUBCUTANEOUS at 06:01

## 2019-01-01 NOTE — SUBJECTIVE & OBJECTIVE
Past Medical History:   Diagnosis Date    Arthritis     CAD (coronary artery disease)     Cataract     CHF (congestive heart failure)     Dr Calvillo    Chronic combined systolic and diastolic congestive heart failure 3/24/2015    CKD (chronic kidney disease), stage III     Diabetes mellitus type II      AM    Diabetic retinopathy     anti Veg F injections for macular edema    Diverticulitis of colon     ED (erectile dysfunction)     Glaucoma     HTN (hypertension)     Hyperlipidemia     Ischemic cardiomyopathy     Obesity     JAHAIRA on CPAP     Pacemaker     Pulmonary HTN        Past Surgical History:   Procedure Laterality Date    CARDIAC CATHETERIZATION  2009    CHOLECYSTECTOMY      COLECTOMY-SIGMOID N/A 4/22/2016    [facility]    COLONOSCOPY N/A 10/11/2018    [facility] Quinn [facility]agon [facility] ENDO    COLOSTOMY N/A 4/22/2016    [facility]    CREATION, ILEOSTOMY N/A 10/30/2018    [facility]    EYE SURGERY      HEMICOLECTOMY, RIGHT Right 10/30/2018    [facility]    KNEE SURGERY      MOBILIZATION, SPLENIC FLEXURE N/A 10/30/2018    [facility]    REVISION OR CLOSURE, COLOSTOMY N/A 10/30/2018    [facility] OR       Review of patient's allergies indicates:  No Known Allergies  Current Facility-Administered Medications   Medication Frequency    acetaminophen tablet 650 mg Q8H PRN    allopurinol tablet 100 mg Daily    bimatoprost 0.01 % ophthalmic drops 1 drop QHS    dextrose 50% injection 12.5 g PRN    dextrose 50% injection 25 g PRN    diphenoxylate-atropine 2.5-0.025 mg per tablet 1 tablet QID    enoxaparin injection 40 mg Daily    glucagon (human recombinant) injection 1 mg PRN    glucose chewable tablet 16 g PRN    glucose chewable tablet 24 g PRN     HYDROcodone-acetaminophen 5-325 mg per tablet 1 tablet Q6H PRN    insulin aspart U-100 pen 1-10 Units QID (AC + HS) PRN    lidocaine (PF) 10 mg/ml (1%) injection 10 mg Once    ondansetron disintegrating tablet 8 mg Q8H PRN    pantoprazole EC tablet 40 mg Daily    promethazine (PHENERGAN) 6.25 mg in dextrose 5 % 50 mL IVPB Q6H PRN    ramelteon tablet 8 mg Nightly PRN    simvastatin tablet 10 mg QHS    sodium bicarbonate tablet 650 mg TID    sodium chloride 0.45% 1,000 mL with sodium bicarbonate 1 mEq/mL (8.4 %) 75 mEq infusion Continuous    sodium chloride 0.9% flush 3 mL PRN     Family History     Problem Relation (Age of Onset)    Cancer Mother    Heart disease Father    No Known Problems Sister, Brother, Maternal Aunt, Maternal Uncle, Paternal Aunt, Paternal Uncle, Maternal Grandmother, Maternal Grandfather, Paternal Grandmother, Paternal Grandfather    Stroke Father        Tobacco Use    Smoking status: Never Smoker    Smokeless tobacco: Never Used   Substance and Sexual Activity    Alcohol use: Yes     Alcohol/week: 0.0 oz     Comment: NO ALCOHOL 72 HOURS PRIOR TO SX    Drug use: No    Sexual activity: Not Currently     Review of Systems   Constitutional: Negative.    HENT: Negative.    Respiratory: Negative.    Cardiovascular: Negative.    Genitourinary: Negative.    Musculoskeletal: Negative.    Neurological: Negative.    Psychiatric/Behavioral: Negative.      Objective:     Vital Signs (Most Recent):  Temp: 99 °F (37.2 °C) (12/31/18 1635)  Pulse: 90 (12/31/18 1635)  Resp: 18 (12/31/18 1635)  BP: 109/67 (12/31/18 1635)  SpO2: 97 % (12/31/18 1635)  O2 Device (Oxygen Therapy): room air (12/31/18 1635) Vital Signs (24h Range):  Temp:  [97.4 °F (36.3 °C)-99 °F (37.2 °C)] 99 °F (37.2 °C)  Pulse:  [] 90  Resp:  [16-18] 18  SpO2:  [97 %-100 %] 97 %  BP: ()/(60-67) 109/67     Weight: 82.1 kg (180 lb 16 oz) (12/28/18 1300)  Body mass index is 27.52 kg/m².  Body surface area is 1.98 meters  squared.    I/O last 3 completed shifts:  In: 4255 [P.O.:1680; I.V.:2575]  Out: 4520 [Urine:2000; Drains:45; Stool:2475]    Physical Exam   Constitutional: He appears well-developed and well-nourished. No distress.   HENT:   Head: Normocephalic and atraumatic.   Cardiovascular: Normal rate, regular rhythm and normal heart sounds. Exam reveals no friction rub.   Pulmonary/Chest: Effort normal and breath sounds normal. No respiratory distress. He has no wheezes. He has no rales.   Abdominal: Soft. Bowel sounds are normal. He exhibits no distension. There is no tenderness.   Musculoskeletal: He exhibits no edema.   Neurological: He is alert.   Skin: Skin is warm and dry.   Psychiatric: He has a normal mood and affect. His behavior is normal. Thought content normal.   Nursing note and vitals reviewed.      Significant Labs: reviewed  BMP  Lab Results   Component Value Date     (L) 12/31/2018    K 5.3 (H) 12/31/2018     12/31/2018    CO2 10 (L) 12/31/2018    BUN 17 12/31/2018    CREATININE 1.5 (H) 12/31/2018    CALCIUM 8.7 12/31/2018    ANIONGAP 7 (L) 12/31/2018    ESTGFRAFRICA 56 (A) 12/31/2018    EGFRNONAA 48 (A) 12/31/2018     Lab Results   Component Value Date    WBC 9.45 12/27/2018    HGB 11.7 (L) 12/27/2018    HCT 36.4 (L) 12/27/2018    MCV 84 12/27/2018     12/27/2018       Significant Imaging: CXR reviewed, none done on this admit.

## 2019-01-01 NOTE — HPI
"Pt was seen and examined. H/o reviewed. Pt is a 62 y/o male was admitted for hyponatremia with s Na of 117. 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 He is s/p closure and colostomy, then colostomy reversal and ileostomy. He has been experiencing large ileostomy output. On admit, he received hypertonic saline, followed by NS IVF's at 100 ml/hour to replace large ileostomy output losses. 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 Pt does not restrict slat intake at home. He has no SOB, no leg swelling. He is on bumex 2 mg po qd. He is noted to have non-AG metabolic acidosis.  "

## 2019-01-01 NOTE — PLAN OF CARE
Problem: Adult Inpatient Plan of Care  Goal: Plan of Care Review  Outcome: Ongoing (interventions implemented as appropriate)  No acute distress noted. IV fluids infusing. Safety measures are in place. Call bell within reach. Pain being managed. Pt free of falls. Will continue to monitor. Chart check complete.

## 2019-01-01 NOTE — SUBJECTIVE & OBJECTIVE
Interval History: Pt was seen and examined. No overall change, no c/o's, no SOB, no leg swelling.    Review of patient's allergies indicates:  No Known Allergies  Current Facility-Administered Medications   Medication Frequency    acetaminophen tablet 650 mg Q8H PRN    allopurinol tablet 100 mg Daily    bimatoprost 0.01 % ophthalmic drops 1 drop QHS    dextrose 50% injection 12.5 g PRN    dextrose 50% injection 25 g PRN    diphenoxylate-atropine 2.5-0.025 mg per tablet 1 tablet QID    enoxaparin injection 40 mg Daily    glucagon (human recombinant) injection 1 mg PRN    glucose chewable tablet 16 g PRN    glucose chewable tablet 24 g PRN    HYDROcodone-acetaminophen 5-325 mg per tablet 1 tablet Q6H PRN    insulin aspart U-100 pen 1-10 Units QID (AC + HS) PRN    lidocaine (PF) 10 mg/ml (1%) injection 10 mg Once    ondansetron disintegrating tablet 8 mg Q8H PRN    pantoprazole EC tablet 40 mg Daily    promethazine (PHENERGAN) 6.25 mg in dextrose 5 % 50 mL IVPB Q6H PRN    ramelteon tablet 8 mg Nightly PRN    simvastatin tablet 10 mg QHS    sodium bicarbonate tablet 1,300 mg TID    sodium chloride 0.9% flush 3 mL PRN       Objective:     Vital Signs (Most Recent):  Temp: 97.7 °F (36.5 °C) (01/01/19 1241)  Pulse: 97 (01/01/19 1241)  Resp: 20 (01/01/19 1241)  BP: 118/71 (01/01/19 1241)  SpO2: 100 % (01/01/19 1241)  O2 Device (Oxygen Therapy): room air (01/01/19 1241) Vital Signs (24h Range):  Temp:  [96.3 °F (35.7 °C)-99 °F (37.2 °C)] 97.7 °F (36.5 °C)  Pulse:  [] 97  Resp:  [18-20] 20  SpO2:  [96 %-100 %] 100 %  BP: ()/(61-71) 118/71     Weight: 82.1 kg (180 lb 16 oz) (12/28/18 1300)  Body mass index is 27.52 kg/m².  Body surface area is 1.98 meters squared.    I/O last 3 completed shifts:  In: 4239.5 [P.O.:1930; I.V.:2309.5]  Out: 4320 [Urine:2400; Drains:370; Stool:1550]    Physical Exam   Constitutional: He appears well-developed and well-nourished. No distress.   HENT:   Head:  Normocephalic and atraumatic.   Cardiovascular: Normal rate, regular rhythm and normal heart sounds. Exam reveals no friction rub.   Pulmonary/Chest: Effort normal and breath sounds normal. No respiratory distress. He has no wheezes. He has no rales.   Abdominal: Soft. Bowel sounds are normal. He exhibits no distension. There is no tenderness.   Musculoskeletal: He exhibits no edema.   Neurological: He is alert.   Skin: Skin is warm and dry.   Psychiatric: He has a normal mood and affect. His behavior is normal. Thought content normal.   Nursing note and vitals reviewed.      Significant Labs: reviewed  BMP  Lab Results   Component Value Date     (L) 01/01/2019    K 5.6 (H) 01/01/2019     (H) 01/01/2019    CO2 13 (L) 01/01/2019    BUN 16 01/01/2019    CREATININE 1.4 01/01/2019    CALCIUM 9.0 01/01/2019    ANIONGAP 5 (L) 01/01/2019    ESTGFRAFRICA >60 01/01/2019    EGFRNONAA 52 (A) 01/01/2019     s osm 272  u osm 248  u Na 63    Significant Imaging: reviewed

## 2019-01-01 NOTE — ASSESSMENT & PLAN NOTE
Hyponatremia: Pt is euvolemic at this point, because the high ileostomy out-put has balanced the hypervolemia from CHF.  Pt has a complicated presentation (both CHF - has moderate to severe CHF - and GI volume loss can cause hyponatremia)  U/a and urine electrolytes will help in the DDX  Prior records show that he also has chronic hyponatremia (as low as Na 129), likely due to CHF    In chronic hyponatremia, no matter what the cause it, s Na should not be corrected rapidly  s Na should be corrected with the same rate as it developed    Pt has other losses via high ileostomy out-put:  Bicarbonate loss, causing normal AG metabolic acidosis  Water loss  Some Na loss  K loss    Recommend the following:  D/c NS IVF's (will make CHF worse)  No role or indication for hypertonic saline  Add sodium bicarbonate 650 mg po tid  1/2 NS with 1.5 amp bicarbonate/L at 50 ml/hr  Hold lisinopril (K is borderline high)  Encourage PO feed.  Send u/a, urine Na, urine osm, serum osm    Thank you for the consult.

## 2019-01-01 NOTE — CONSULTS
"[facility] -   Nephrology  Consult Note      Patient Name: Yan Choudhury  MRN: [id number]  Admission Date: 12/28/2018  Hospital Length of Stay: 2 days  Attending Provider: Kevin Lindsay MD   Primary Care Physician: Sandra Estevez MD  Principal Problem:Hyponatremia     Referring physician: Dr. Lindsay  Reason for consul: hyponatremia    Consults  Subjective:     HPI: Pt was seen and examined. H/o reviewed. Pt is a 64 y/o male was admitted for hyponatremia with s Na of 117. 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 He is s/p closure and colostomy, then colostomy reversal and ileostomy. He has been experiencing large ileostomy output. On admit, he received hypertonic saline, followed by NS IVF's at 100 ml/hour to replace large ileostomy output losses. 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 Pt does not restrict slat intake at home. He has no SOB, no leg swelling. He is on bumex 2 mg po qd. He is noted to have non-AG metabolic acidosis.    Past Medical History:   Diagnosis Date    Arthritis     CAD (coronary artery disease)     Cataract     CHF (congestive heart failure)     Dr Calvillo    Chronic combined systolic and diastolic congestive heart failure 3/24/2015    CKD (chronic kidney disease), stage III     Diabetes mellitus type II      AM    Diabetic retinopathy     anti Veg F injections for macular edema    Diverticulitis of colon     ED (erectile dysfunction)     Glaucoma     HTN (hypertension)     Hyperlipidemia     Ischemic cardiomyopathy     Obesity     JAHAIRA on CPAP     Pacemaker     Pulmonary HTN        Past Surgical History:   Procedure Laterality Date    CARDIAC CATHETERIZATION  2009    CHOLECYSTECTOMY      COLECTOMY-SIGMOID N/A 4/22/2016    [facility]    COLONOSCOPY N/A 10/11/2018    [facility]formed " [facility] ENDO    COLOSTOMY N/A 4/22/2016    [facility] OR    CREATION, ILEOSTOMY N/A 10/30/2018    [facility] OR    EYE SURGERY      HEMICOLECTOMY, RIGHT Right 10/30/2018    [facility] OR    KNEE SURGERY      MOBILIZATION, SPLENIC FLEXURE N/A 10/30/2018    [facility] OR    REVISION OR CLOSURE, COLOSTOMY N/A 10/30/2018    [facility] by [facility] at [facility] OR       Review of patient's allergies indicates:  No Known Allergies  Current Facility-Administered Medications   Medication Frequency    acetaminophen tablet 650 mg Q8H PRN    allopurinol tablet 100 mg Daily    bimatoprost 0.01 % ophthalmic drops 1 drop QHS    dextrose 50% injection 12.5 g PRN    dextrose 50% injection 25 g PRN    diphenoxylate-atropine 2.5-0.025 mg per tablet 1 tablet QID    enoxaparin injection 40 mg Daily    glucagon (human recombinant) injection 1 mg PRN    glucose chewable tablet 16 g PRN    glucose chewable tablet 24 g PRN    HYDROcodone-acetaminophen 5-325 mg per tablet 1 tablet Q6H PRN    insulin aspart U-100 pen 1-10 Units QID (AC + HS) PRN    lidocaine (PF) 10 mg/ml (1%) injection 10 mg Once    ondansetron disintegrating tablet 8 mg Q8H PRN    pantoprazole EC tablet 40 mg Daily    promethazine (PHENERGAN) 6.25 mg in dextrose 5 % 50 mL IVPB Q6H PRN    ramelteon tablet 8 mg Nightly PRN    simvastatin tablet 10 mg QHS    sodium bicarbonate tablet 650 mg TID    sodium chloride 0.45% 1,000 mL with sodium bicarbonate 1 mEq/mL (8.4 %) 75 mEq infusion Continuous    sodium chloride 0.9% flush 3 mL PRN     Family History     Problem Relation (Age of Onset)    Cancer Mother    Heart disease Father    No Known Problems Sister, Brother, Maternal Aunt, Maternal Uncle, Paternal Aunt, Paternal Uncle, Maternal Grandmother, Maternal Grandfather, Paternal Grandmother,  Paternal Grandfather    Stroke Father        Tobacco Use    Smoking status: Never Smoker    Smokeless tobacco: Never Used   Substance and Sexual Activity    Alcohol use: Yes     Alcohol/week: 0.0 oz     Comment: NO ALCOHOL 72 HOURS PRIOR TO SX    Drug use: No    Sexual activity: Not Currently     Review of Systems   Constitutional: Negative.    HENT: Negative.    Respiratory: Negative.    Cardiovascular: Negative.    Genitourinary: Negative.    Musculoskeletal: Negative.    Neurological: Negative.    Psychiatric/Behavioral: Negative.      Objective:     Vital Signs (Most Recent):  Temp: 99 °F (37.2 °C) (12/31/18 1635)  Pulse: 90 (12/31/18 1635)  Resp: 18 (12/31/18 1635)  BP: 109/67 (12/31/18 1635)  SpO2: 97 % (12/31/18 1635)  O2 Device (Oxygen Therapy): room air (12/31/18 1635) Vital Signs (24h Range):  Temp:  [97.4 °F (36.3 °C)-99 °F (37.2 °C)] 99 °F (37.2 °C)  Pulse:  [] 90  Resp:  [16-18] 18  SpO2:  [97 %-100 %] 97 %  BP: ()/(60-67) 109/67     Weight: 82.1 kg (180 lb 16 oz) (12/28/18 1300)  Body mass index is 27.52 kg/m².  Body surface area is 1.98 meters squared.    I/O last 3 completed shifts:  In: 4255 [P.O.:1680; I.V.:2575]  Out: 4520 [Urine:2000; Drains:45; Stool:2475]    Physical Exam   Constitutional: He appears well-developed and well-nourished. No distress.   HENT:   Head: Normocephalic and atraumatic.   Cardiovascular: Normal rate, regular rhythm and normal heart sounds. Exam reveals no friction rub.   Pulmonary/Chest: Effort normal and breath sounds normal. No respiratory distress. He has no wheezes. He has no rales.   Abdominal: Soft. Bowel sounds are normal. He exhibits no distension. There is no tenderness.   Musculoskeletal: He exhibits no edema.   Neurological: He is alert.   Skin: Skin is warm and dry.   Psychiatric: He has a normal mood and affect. His behavior is normal. Thought content normal.   Nursing note and vitals reviewed.      Significant Labs: reviewed  BMP  Lab  Results   Component Value Date     (L) 12/31/2018    K 5.3 (H) 12/31/2018     12/31/2018    CO2 10 (L) 12/31/2018    BUN 17 12/31/2018    CREATININE 1.5 (H) 12/31/2018    CALCIUM 8.7 12/31/2018    ANIONGAP 7 (L) 12/31/2018    ESTGFRAFRICA 56 (A) 12/31/2018    EGFRNONAA 48 (A) 12/31/2018     Lab Results   Component Value Date    WBC 9.45 12/27/2018    HGB 11.7 (L) 12/27/2018    HCT 36.4 (L) 12/27/2018    MCV 84 12/27/2018     12/27/2018       Significant Imaging: CXR reviewed, none done on this admit.    Assessment/Plan:   64 y/o male with hyponatremia, high ileostomy out-put and past h/o of CHF:    * Hyponatremia    Hyponatremia: Pt is euvolemic at this point, because the high ileostomy out-put has balanced the hypervolemia from CHF.  Pt has a complicated presentation (both CHF - has moderate to severe CHF - and GI volume loss can cause hyponatremia)  U/a and urine electrolytes will help in the DDX  Prior records show that he also has chronic hyponatremia (as low as Na 129), likely due to CHF    In chronic hyponatremia, no matter what the cause it, s Na should not be corrected rapidly  s Na should be corrected with the same rate as it developed    Pt has other losses via high ileostomy out-put:  Bicarbonate loss, causing normal AG metabolic acidosis  Water loss  Some Na loss  K loss    Recommend the following:  Hold bumex  D/c NS IVF's (will make CHF worse)  No role or indication for hypertonic saline  Add sodium bicarbonate 650 mg po tid  1/2 NS with 1.5 amp bicarbonate/L at 50 ml/hr  Hold lisinopril (K is borderline high)  Encourage PO feed.  Send u/a, urine Na, urine osm, serum osm    Thank you for the consult.       Plans and recommendations:  As discussed above  Total time spent 60 minutes including time needed to review the records, the   patient evaluation, documentation, face-to-face discussion with the patient,   more than 50% of the time was spent on coordination of care and counseling.     Level V visit.    Juan Luis Galeas MD   Nephrology  [facility]

## 2019-01-01 NOTE — PROGRESS NOTES
[facility] -   General Surgery  Progress Note    Subjective:     History of Present Illness:  Patient underwent a colostomy reversal.  He had a leak at the anastomosis. A protective ileostomy was formed.  He has a drain in place that now has serous output.  He has had high is ileostomy outputs in the past requiring admission and rehydration.  He states that the iliac output is more solid than it has been in the past    Patient's sodium was found to be 119.  He was admitted for rehydration and correction of hyponatremia    Post-Op Info:  * No surgery found *         Interval History:  Sodium improved to 132 today. Renal consulted yesterday and recs inputed. Tolerating diet with ostomy output of 1.0L.     Medications:  Continuous Infusions:   sodium chloride 0.9% 100 mL/hr at 12/31/18 0857     Scheduled Meds:   allopurinol  100 mg Oral Daily    bimatoprost  1 drop Left Eye QHS    bumetanide  2 mg Oral QAM    diphenoxylate-atropine 2.5-0.025 mg  1 tablet Oral QID    enoxaparin  40 mg Subcutaneous Daily    lidocaine (PF) 10 mg/ml (1%)  1 mL Other Once    lisinopril  5 mg Oral QAM    pantoprazole  40 mg Oral Daily    simvastatin  10 mg Oral QHS     PRN Meds:acetaminophen, dextrose 50%, dextrose 50%, glucagon (human recombinant), glucose, glucose, HYDROcodone-acetaminophen, insulin aspart U-100, ondansetron, promethazine (PHENERGAN) IVPB, ramelteon, sodium chloride 0.9%     Review of patient's allergies indicates:  No Known Allergies  Objective:     Vital Signs (Most Recent):  Temp: 98 °F (36.7 °C) (12/31/18 0703)  Pulse: 97 (12/31/18 0703)  Resp: 16 (12/31/18 0703)  BP: 102/66 (12/31/18 0703)  SpO2: 98 % (12/31/18 0703) Vital Signs (24h Range):  Temp:  [97.4 °F (36.3 °C)-98 °F (36.7 °C)] 98 °F (36.7 °C)  Pulse:  [] 97  Resp:  [14-18] 16  SpO2:  [98 %-100 %] 98 %  BP: (100-117)/(62-69) 102/66     Weight: 82.1 kg (180 lb 16 oz)  Body mass index is 27.52 kg/m².    Intake/Output - Last 3 Shifts        12/29 0700 - 12/30 0659 12/30 0700 - 12/31 0659 12/31 0700 - 01/01 0659    P.O. 1920 1200     I.V. (mL/kg) 3943.3 (48) 1275 (15.5)     Total Intake(mL/kg) 5863.3 (71.4) 2475 (30.1)     Urine (mL/kg/hr) 2375 (1.2) 2000 (1) 400 (1.8)    Drains 10 35     Stool 2175 1250 375    Total Output 4560 3285 775    Net +1303.3 -810 -775           Urine Occurrence 1 x 4 x           Physical Exam   Constitutional: He appears well-developed. No distress.   HENT:   Head: Normocephalic and atraumatic.   Neck: Normal range of motion. Neck supple.   Cardiovascular: Normal rate, regular rhythm and normal heart sounds.   Pulmonary/Chest: Effort normal and breath sounds normal.   Abdominal: Soft. Bowel sounds are normal. He exhibits no distension. There is no tenderness.   Right upper quadrant ileostomy pink and viable with liquid stool in bag    Drain with serosanguineous output   Vitals reviewed.      Significant Labs:  CBC:   Recent Labs   Lab 12/27/18  1455   WBC 9.45   RBC 4.32*   HGB 11.7*   HCT 36.4*      MCV 84   MCH 27.1   MCHC 32.1     BMP:   Recent Labs   Lab 12/30/18  0601 12/31/18  0503   GLU 92 109   * 125*   K 5.3* 5.3*    108   CO2 11* 10*   BUN 19 17   CREATININE 1.7* 1.5*   CALCIUM 9.2 8.7   MG 1.3*  --      CMP:   Recent Labs   Lab 12/27/18  1455  12/31/18  0503   *   < > 109   CALCIUM 9.6   < > 8.7   ALBUMIN 3.4*  --   --    PROT 7.8  --   --    *   < > 125*   K 5.5*   < > 5.3*   CO2 15*   < > 10*   CL 94*   < > 108   BUN 19   < > 17   CREATININE 2.0*   < > 1.5*   ALKPHOS 96  --   --    ALT 14  --   --    AST 30  --   --    BILITOT 0.9  --   --     < > = values in this interval not displayed.       Significant Diagnostics:  None    Assessment/Plan:     * Hyponatremia    Improving, monitor with daily labs.     Hyperkalemia    Monitor     Ileostomy in place    Monitor ileostomy output  Continue Lomotil, may need to add Imodium to get output under 1000mL/24hr     Gastroesophageal  reflux disease    PPI     Biventricular ICD (implantable cardioverter-defibrillator) in place    Monitor     Pulmonary HTN    Monitor     Ischemic cardiomyopathy    Monitor     Hypertension associated with diabetes    Hold lisinopril per nephrology recs, PRN meds         Quinn Aragon MD  General Surgery  [facility]

## 2019-01-01 NOTE — PROGRESS NOTES
"[facility]  Nephrology  Progress Note    Patient Name: Yan Choudhury  MRN: [id number]  Admission Date: 12/28/2018  Hospital Length of Stay: 3 days  Attending Provider: Kevin Lindsay MD   Primary Care Physician: Sandra Estevez MD  Principal Problem:Hyponatremia    Subjective:     HPI: Pt was seen and examined. H/o reviewed. Pt is a 62 y/o male was admitted for hyponatremia with s Na of 117. 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 He is s/p closure and colostomy, then colostomy reversal and ileostomy. He has been experiencing large ileostomy output. On admit, he received hypertonic saline, followed by NS IVF's at 100 ml/hour to replace large ileostomy output losses. 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 Pt does not restrict slat intake at home. He has no SOB, no leg swelling. He is on bumex 2 mg po qd. He is noted to have non-AG metabolic acidosis.    Interval History: Pt was seen and examined. No overall change, no c/o's, no SOB, no leg swelling.    Review of patient's allergies indicates:  No Known Allergies  Current Facility-Administered Medications   Medication Frequency    acetaminophen tablet 650 mg Q8H PRN    allopurinol tablet 100 mg Daily    bimatoprost 0.01 % ophthalmic drops 1 drop QHS    dextrose 50% injection 12.5 g PRN    dextrose 50% injection 25 g PRN    diphenoxylate-atropine 2.5-0.025 mg per tablet 1 tablet QID    enoxaparin injection 40 mg Daily    glucagon (human recombinant) injection 1 mg PRN    glucose chewable tablet 16 g PRN    glucose chewable tablet 24 g PRN    HYDROcodone-acetaminophen 5-325 mg per tablet 1 tablet Q6H PRN    insulin aspart U-100 pen 1-10 Units QID (AC + HS) PRN    lidocaine (PF) 10 mg/ml (1%) injection 10 mg Once    ondansetron disintegrating tablet 8 mg Q8H PRN    pantoprazole EC tablet 40 mg Daily "    promethazine (PHENERGAN) 6.25 mg in dextrose 5 % 50 mL IVPB Q6H PRN    ramelteon tablet 8 mg Nightly PRN    simvastatin tablet 10 mg QHS    sodium bicarbonate tablet 1,300 mg TID    sodium chloride 0.9% flush 3 mL PRN       Objective:     Vital Signs (Most Recent):  Temp: 97.7 °F (36.5 °C) (01/01/19 1241)  Pulse: 97 (01/01/19 1241)  Resp: 20 (01/01/19 1241)  BP: 118/71 (01/01/19 1241)  SpO2: 100 % (01/01/19 1241)  O2 Device (Oxygen Therapy): room air (01/01/19 1241) Vital Signs (24h Range):  Temp:  [96.3 °F (35.7 °C)-99 °F (37.2 °C)] 97.7 °F (36.5 °C)  Pulse:  [] 97  Resp:  [18-20] 20  SpO2:  [96 %-100 %] 100 %  BP: ()/(61-71) 118/71     Weight: 82.1 kg (180 lb 16 oz) (12/28/18 1300)  Body mass index is 27.52 kg/m².  Body surface area is 1.98 meters squared.    I/O last 3 completed shifts:  In: 4239.5 [P.O.:1930; I.V.:2309.5]  Out: 4320 [Urine:2400; Drains:370; Stool:1550]    Physical Exam   Constitutional: He appears well-developed and well-nourished. No distress.   HENT:   Head: Normocephalic and atraumatic.   Cardiovascular: Normal rate, regular rhythm and normal heart sounds. Exam reveals no friction rub.   Pulmonary/Chest: Effort normal and breath sounds normal. No respiratory distress. He has no wheezes. He has no rales.   Abdominal: Soft. Bowel sounds are normal. He exhibits no distension. There is no tenderness.   Musculoskeletal: He exhibits no edema.   Neurological: He is alert.   Skin: Skin is warm and dry.   Psychiatric: He has a normal mood and affect. His behavior is normal. Thought content normal.   Nursing note and vitals reviewed.      Significant Labs: reviewed  BMP  Lab Results   Component Value Date     (L) 01/01/2019    K 5.6 (H) 01/01/2019     (H) 01/01/2019    CO2 13 (L) 01/01/2019    BUN 16 01/01/2019    CREATININE 1.4 01/01/2019    CALCIUM 9.0 01/01/2019    ANIONGAP 5 (L) 01/01/2019    ESTGFRAFRICA >60 01/01/2019    EGFRNONAA 52 (A) 01/01/2019     s osm 272  u  osm 248  u Na 63    Significant Imaging: reviewed    Assessment/Plan:         62 y/o male with hyponatremia, high ileostomy out-put and past h/o of CHF:         * Hyponatremia     Hyponatremia: improved with current mgmt.  Pt remains to be euvolemic today, because the high ileostomy out-put is balancing the hypervolemia from CHF.  Metabolic acidosis (normal AG) due to ileostomy losses, being replaced  Will increase Po dose  bumex was stopped, pt is euvolemic    Pt has a complicated presentation (both CHF - has moderate to severe CHF - and GI volume loss can cause hyponatremia)  Prior records show that he also has chronic hyponatremia (as low as Na 129), likely due to CHF  Current s na is close to prior baseline, chronic hyponatremia     In chronic hyponatremia, no matter what the cause it, s Na should not be corrected rapidly  s Na should be corrected with the same rate as it developed        Recommend the following:  Continue to hold bumex  Continue  sodium bicarbonate  And increase dose to 2 tid   Continue 1/2 NS with 1.5 amp bicarbonate/L at 50 ml/hr  Continue to hold lisinopril (K is borderline high)  Encourage PO feed.            Plans and recommendations:  As discussed above               Juan Luis Galeas MD  Nephrology  [facility]

## 2019-01-01 NOTE — PLAN OF CARE
Problem: Adult Inpatient Plan of Care  Goal: Plan of Care Review  Outcome: Ongoing (interventions implemented as appropriate)  Patient remained free from injury. No c/o pain at this time. Calm. Watching TV. Wife at bedside. No distress noted. Oriented x3. Respirations even and non labored. IV  patent and infusing. Bed locked and low. Call light in reach. Safety measures in place. Will continue to monitor. Reviewed plan of care. Patient verbalized understanding and teach back.    12hr chart check complete.

## 2019-01-01 NOTE — ASSESSMENT & PLAN NOTE
Monitor ileostomy output  Continue Lomotil, may need to add Imodium to get output under 1000mL/24hr

## 2019-01-01 NOTE — ASSESSMENT & PLAN NOTE
62 y/o male with hyponatremia, high ileostomy out-put and past h/o of CHF:         * Hyponatremia     Hyponatremia: Pt is euvolemic at this point, because the high ileostomy out-put has balanced the hypervolemia from CHF.  Pt has a complicated presentation (both CHF - has moderate to severe CHF - and GI volume loss can cause hyponatremia)  U/a and urine electrolytes will help in the DDX  Prior records show that he also has chronic hyponatremia (as low as Na 129), likely due to CHF     In chronic hyponatremia, no matter what the cause it, s Na should not be corrected rapidly  s Na should be corrected with the same rate as it developed     Pt has other losses via high ileostomy out-put:  Bicarbonate loss, causing normal AG metabolic acidosis  Water loss  Some Na loss  K loss     Recommend the following:  Hold bumex  D/c NS IVF's (will make CHF worse)  No role or indication for hypertonic saline  Add sodium bicarbonate 650 mg po tid  1/2 NS with 1.5 amp bicarbonate/L at 50 ml/hr  Hold lisinopril (K is borderline high)  Encourage PO feed.  Send u/a, urine Na, urine osm, serum osm     Thank you for the consult.         Plans and recommendations:  As discussed above

## 2019-01-02 LAB
ANION GAP SERPL CALC-SCNC: 6 MMOL/L
BUN SERPL-MCNC: 17 MG/DL
CALCIUM SERPL-MCNC: 9.4 MG/DL
CHLORIDE SERPL-SCNC: 111 MMOL/L
CO2 SERPL-SCNC: 17 MMOL/L
CREAT SERPL-MCNC: 1.5 MG/DL
EST. GFR  (AFRICAN AMERICAN): 56 ML/MIN/1.73 M^2
EST. GFR  (NON AFRICAN AMERICAN): 48 ML/MIN/1.73 M^2
GLUCOSE SERPL-MCNC: 113 MG/DL
POCT GLUCOSE: 125 MG/DL (ref 70–110)
POTASSIUM SERPL-SCNC: 5.2 MMOL/L
SODIUM SERPL-SCNC: 134 MMOL/L

## 2019-01-02 PROCEDURE — 99233 SBSQ HOSP IP/OBS HIGH 50: CPT | Mod: ,,, | Performed by: INTERNAL MEDICINE

## 2019-01-02 PROCEDURE — 25000003 PHARM REV CODE 250: Performed by: INTERNAL MEDICINE

## 2019-01-02 PROCEDURE — 80048 BASIC METABOLIC PNL TOTAL CA: CPT

## 2019-01-02 PROCEDURE — 25000003 PHARM REV CODE 250: Performed by: SURGERY

## 2019-01-02 PROCEDURE — 36415 COLL VENOUS BLD VENIPUNCTURE: CPT

## 2019-01-02 PROCEDURE — 11000001 HC ACUTE MED/SURG PRIVATE ROOM

## 2019-01-02 PROCEDURE — 99233 PR SUBSEQUENT HOSPITAL CARE,LEVL III: ICD-10-PCS | Mod: ,,, | Performed by: INTERNAL MEDICINE

## 2019-01-02 PROCEDURE — 63600175 PHARM REV CODE 636 W HCPCS: Performed by: SURGERY

## 2019-01-02 RX ORDER — SODIUM BICARBONATE 650 MG/1
1950 TABLET ORAL 3 TIMES DAILY
Status: DISCONTINUED | OUTPATIENT
Start: 2019-01-02 | End: 2019-01-04 | Stop reason: HOSPADM

## 2019-01-02 RX ADMIN — DIPHENOXYLATE HYDROCHLORIDE AND ATROPINE SULFATE 1 TABLET: 2.5; .025 TABLET ORAL at 05:01

## 2019-01-02 RX ADMIN — SODIUM BICARBONATE 650 MG TABLET 1950 MG: at 10:01

## 2019-01-02 RX ADMIN — DIPHENOXYLATE HYDROCHLORIDE AND ATROPINE SULFATE 1 TABLET: 2.5; .025 TABLET ORAL at 10:01

## 2019-01-02 RX ADMIN — SODIUM BICARBONATE 650 MG TABLET 1950 MG: at 04:01

## 2019-01-02 RX ADMIN — DIPHENOXYLATE HYDROCHLORIDE AND ATROPINE SULFATE 1 TABLET: 2.5; .025 TABLET ORAL at 01:01

## 2019-01-02 RX ADMIN — ALLOPURINOL 100 MG: 100 TABLET ORAL at 08:01

## 2019-01-02 RX ADMIN — DIPHENOXYLATE HYDROCHLORIDE AND ATROPINE SULFATE 1 TABLET: 2.5; .025 TABLET ORAL at 09:01

## 2019-01-02 RX ADMIN — ENOXAPARIN SODIUM 40 MG: 100 INJECTION SUBCUTANEOUS at 05:01

## 2019-01-02 RX ADMIN — SODIUM BICARBONATE 650 MG TABLET 1300 MG: at 08:01

## 2019-01-02 RX ADMIN — PANTOPRAZOLE SODIUM 40 MG: 40 TABLET, DELAYED RELEASE ORAL at 08:01

## 2019-01-02 RX ADMIN — BIMATOPROST 1 DROP: 0.1 SOLUTION/ DROPS OPHTHALMIC at 09:01

## 2019-01-02 RX ADMIN — SIMVASTATIN 10 MG: 5 TABLET, FILM COATED ORAL at 09:01

## 2019-01-02 RX ADMIN — SODIUM BICARBONATE 650 MG TABLET 1950 MG: at 09:01

## 2019-01-02 NOTE — PLAN OF CARE
Problem: Adult Inpatient Plan of Care  Goal: Plan of Care Review  Outcome: Ongoing (interventions implemented as appropriate)  Patient remained free from injury. No c/o pain at this time. Calm. Watching TV. No distress noted. Oriented x3. Respirations even and non labored. IV patent and saline locked. Bed locked and low. Call light in reach. Safety measures in place. Will continue to monitor. Reviewed plan of care. Patient verbalized understanding and teach back.    12hr chart check complete.

## 2019-01-02 NOTE — ASSESSMENT & PLAN NOTE
62 y/o male with hyponatremia, high ileostomy out-put and past h/o of CHF:           * Hyponatremia     Hyponatremia: improved with current mgmt.  Pt remains to be euvolemic today, because the high ileostomy out-put is balancing the hypervolemia from CHF.  Metabolic acidosis (normal AG) due to ileostomy losses, being replaced  Will increase Po dose  bumex was stopped, pt is euvolemic     Pt has a complicated presentation (both CHF - has moderate to severe CHF - and GI volume loss can cause hyponatremia)  Prior records show that he also has chronic hyponatremia (as low as Na 129), likely due to CHF  Current s na is close to prior baseline, chronic hyponatremia     In chronic hyponatremia, no matter what the cause it, s Na should not be corrected rapidly  s Na should be corrected with the same rate as it developed        Recommend the following:  Continue to hold bumex  Continue  sodium bicarbonate  And increase dose to 2 tid   Continue 1/2 NS with 1.5 amp bicarbonate/L at 50 ml/hr  Continue to hold lisinopril (K is borderline high)  Encourage PO feed.            Plans and recommendations:  As discussed above

## 2019-01-02 NOTE — ASSESSMENT & PLAN NOTE
Monitor ileostomy output  Continue Lomotil, may need to add Imodium to get output under 1000mL/24hr  Stop Metamucil

## 2019-01-02 NOTE — PLAN OF CARE
Problem: Adult Inpatient Plan of Care  Goal: Plan of Care Review  Outcome: Ongoing (interventions implemented as appropriate)  No acute distress noted. Blood glucose being monitored. VSS. Colostomy in place. KINDRA drain in place. Drsg dry and intact. Safety measures are in place. Call bell within reach. Pain being managed. Pt free of falls. Will continue to monitor. Chart check complete.

## 2019-01-02 NOTE — SUBJECTIVE & OBJECTIVE
Interval History: Pt was seen and examined. No new c/o's, stable last pm. Per nurse, ileostomy out put is less than before. No SOB.    Review of patient's allergies indicates:  No Known Allergies  Current Facility-Administered Medications   Medication Frequency    acetaminophen tablet 650 mg Q8H PRN    allopurinol tablet 100 mg Daily    bimatoprost 0.01 % ophthalmic drops 1 drop QHS    dextrose 50% injection 12.5 g PRN    dextrose 50% injection 25 g PRN    diphenoxylate-atropine 2.5-0.025 mg per tablet 1 tablet QID    enoxaparin injection 40 mg Daily    glucagon (human recombinant) injection 1 mg PRN    glucose chewable tablet 16 g PRN    glucose chewable tablet 24 g PRN    HYDROcodone-acetaminophen 5-325 mg per tablet 1 tablet Q6H PRN    insulin aspart U-100 pen 1-10 Units QID (AC + HS) PRN    lidocaine (PF) 10 mg/ml (1%) injection 10 mg Once    ondansetron disintegrating tablet 8 mg Q8H PRN    pantoprazole EC tablet 40 mg Daily    promethazine (PHENERGAN) 6.25 mg in dextrose 5 % 50 mL IVPB Q6H PRN    ramelteon tablet 8 mg Nightly PRN    simvastatin tablet 10 mg QHS    sodium bicarbonate tablet 1,950 mg TID    sodium chloride 0.9% flush 3 mL PRN       Objective:     Vital Signs (Most Recent):  Temp: 97.8 °F (36.6 °C) (01/02/19 0856)  Pulse: 97 (01/02/19 0856)  Resp: 18 (01/02/19 0856)  BP: 109/65 (01/02/19 0856)  SpO2: 100 % (01/02/19 0856)  O2 Device (Oxygen Therapy): room air (01/02/19 0856) Vital Signs (24h Range):  Temp:  [97 °F (36.1 °C)-98.2 °F (36.8 °C)] 97.8 °F (36.6 °C)  Pulse:  [] 97  Resp:  [18-20] 18  SpO2:  [99 %-100 %] 100 %  BP: (105-119)/(59-71) 109/65     Weight: 82.1 kg (180 lb 16 oz) (12/28/18 1300)  Body mass index is 27.52 kg/m².  Body surface area is 1.98 meters squared.    I/O last 3 completed shifts:  In: 960 [P.O.:960]  Out: 2375 [Urine:1050; Drains:25; Stool:1300]    Physical Exam   Constitutional: He appears well-developed and well-nourished. No distress.   HENT:    Head: Normocephalic and atraumatic.   Cardiovascular: Normal rate, regular rhythm and normal heart sounds. Exam reveals no friction rub.   Pulmonary/Chest: Effort normal and breath sounds normal. No respiratory distress. He has no wheezes. He has no rales.   Abdominal: Soft. Bowel sounds are normal. He exhibits no distension. There is no tenderness.   Musculoskeletal: He exhibits no edema.   Neurological: He is alert.   Skin: Skin is warm and dry.   Psychiatric: He has a normal mood and affect. His behavior is normal. Thought content normal.   Nursing note and vitals reviewed.      Significant Labs: reviewed  BMP  Lab Results   Component Value Date     (L) 01/02/2019    K 5.2 (H) 01/02/2019     (H) 01/02/2019    CO2 17 (L) 01/02/2019    BUN 17 01/02/2019    CREATININE 1.5 (H) 01/02/2019    CALCIUM 9.4 01/02/2019    ANIONGAP 6 (L) 01/02/2019    ESTGFRAFRICA 56 (A) 01/02/2019    EGFRNONAA 48 (A) 01/02/2019         Significant Imaging: reviewed

## 2019-01-02 NOTE — PROGRESS NOTES
[facility]   General Surgery  Progress Note    Subjective:     History of Present Illness:  Patient underwent a colostomy reversal.  He had a leak at the anastomosis. A protective ileostomy was formed.  He has a drain in place that now has serous output.  He has had high is ileostomy outputs in the past requiring admission and rehydration.  He states that the iliac output is more solid than it has been in the past    Patient's sodium was found to be 119.  He was admitted for rehydration and correction of hyponatremia    Post-Op Info:  * No surgery found *         Interval History:  Sodium is improved, ileostomy output is about a L    Medications:  Continuous Infusions:  Scheduled Meds:   allopurinol  100 mg Oral Daily    bimatoprost  1 drop Left Eye QHS    diphenoxylate-atropine 2.5-0.025 mg  1 tablet Oral QID    enoxaparin  40 mg Subcutaneous Daily    lidocaine (PF) 10 mg/ml (1%)  1 mL Other Once    pantoprazole  40 mg Oral Daily    simvastatin  10 mg Oral QHS    sodium bicarbonate  1,950 mg Oral TID     PRN Meds:acetaminophen, dextrose 50%, dextrose 50%, glucagon (human recombinant), glucose, glucose, HYDROcodone-acetaminophen, insulin aspart U-100, ondansetron, promethazine (PHENERGAN) IVPB, ramelteon, sodium chloride 0.9%     Review of patient's allergies indicates:  No Known Allergies  Objective:     Vital Signs (Most Recent):  Temp: 97.8 °F (36.6 °C) (01/02/19 0856)  Pulse: 97 (01/02/19 0856)  Resp: 18 (01/02/19 0856)  BP: 109/65 (01/02/19 0856)  SpO2: 100 % (01/02/19 0856) Vital Signs (24h Range):  Temp:  [97 °F (36.1 °C)-98.2 °F (36.8 °C)] 97.8 °F (36.6 °C)  Pulse:  [] 97  Resp:  [18-20] 18  SpO2:  [99 %-100 %] 100 %  BP: (105-119)/(59-71) 109/65     Weight: 82.1 kg (180 lb 16 oz)  Body mass index is 27.52 kg/m².    Intake/Output - Last 3 Shifts       12/31 0700 - 01/01 0659 01/01 0700 - 01/02 0659 01/02 0700 - 01/03 0659    P.O. 1450 480 240    I.V. (mL/kg) 2309.5 (28.1)       Total Intake(mL/kg) 3759.5 (45.8) 480 (5.8) 240 (2.9)    Urine (mL/kg/hr) 1800 (0.9) 550 (0.3) 100 (0.2)    Emesis/NG output   0    Drains 360 15     Stool 1000 1000 250    Total Output 3160 1565 350    Net +599.5 -1085 -110           Emesis Occurrence   150 x          Physical Exam   Constitutional:   Chronically ill   Eyes: Pupils are equal, round, and reactive to light.   Neck: Normal range of motion. Neck supple.   Cardiovascular: Normal rate and regular rhythm.   Pulmonary/Chest: Effort normal.   Abdominal: Soft.   Drain with serous output, right upper quadrant ileostomy with more formed output   Skin: Skin is warm and dry.   Vitals reviewed.      Significant Labs:  CBC:   Recent Labs   Lab 12/27/18  1455   WBC 9.45   RBC 4.32*   HGB 11.7*   HCT 36.4*      MCV 84   MCH 27.1   MCHC 32.1     BMP:   Recent Labs   Lab 12/30/18  0601  01/02/19  0419   GLU 92   < > 113*   *   < > 134*   K 5.3*   < > 5.2*      < > 111*   CO2 11*   < > 17*   BUN 19   < > 17   CREATININE 1.7*   < > 1.5*   CALCIUM 9.2   < > 9.4   MG 1.3*  --   --     < > = values in this interval not displayed.       Significant Diagnostics:  None    Assessment/Plan:     * Hyponatremia    Improving, monitor with daily labs.  One sodium is above 136 the patient will be discharged     Hyperkalemia    Monitor     Ileostomy in place    Monitor ileostomy output  Continue Lomotil, may need to add Imodium to get output under 1000mL/24hr  Stop Metamucil     Gastroesophageal reflux disease    PPI     Biventricular ICD (implantable cardioverter-defibrillator) in place    Monitor     Pulmonary HTN    Monitor     Ischemic cardiomyopathy    Monitor     Hypertension associated with diabetes    Hold lisinopril per nephrology recs, PRN meds         Kevin Lindsay MD  General Surgery  [facility]

## 2019-01-02 NOTE — SUBJECTIVE & OBJECTIVE
Interval History:  Sodium is improved, ileostomy output is about a L    Medications:  Continuous Infusions:  Scheduled Meds:   allopurinol  100 mg Oral Daily    bimatoprost  1 drop Left Eye QHS    diphenoxylate-atropine 2.5-0.025 mg  1 tablet Oral QID    enoxaparin  40 mg Subcutaneous Daily    lidocaine (PF) 10 mg/ml (1%)  1 mL Other Once    pantoprazole  40 mg Oral Daily    simvastatin  10 mg Oral QHS    sodium bicarbonate  1,950 mg Oral TID     PRN Meds:acetaminophen, dextrose 50%, dextrose 50%, glucagon (human recombinant), glucose, glucose, HYDROcodone-acetaminophen, insulin aspart U-100, ondansetron, promethazine (PHENERGAN) IVPB, ramelteon, sodium chloride 0.9%     Review of patient's allergies indicates:  No Known Allergies  Objective:     Vital Signs (Most Recent):  Temp: 97.8 °F (36.6 °C) (01/02/19 0856)  Pulse: 97 (01/02/19 0856)  Resp: 18 (01/02/19 0856)  BP: 109/65 (01/02/19 0856)  SpO2: 100 % (01/02/19 0856) Vital Signs (24h Range):  Temp:  [97 °F (36.1 °C)-98.2 °F (36.8 °C)] 97.8 °F (36.6 °C)  Pulse:  [] 97  Resp:  [18-20] 18  SpO2:  [99 %-100 %] 100 %  BP: (105-119)/(59-71) 109/65     Weight: 82.1 kg (180 lb 16 oz)  Body mass index is 27.52 kg/m².    Intake/Output - Last 3 Shifts       12/31 0700 - 01/01 0659 01/01 0700 - 01/02 0659 01/02 0700 - 01/03 0659    P.O. 1450 480 240    I.V. (mL/kg) 2309.5 (28.1)      Total Intake(mL/kg) 3759.5 (45.8) 480 (5.8) 240 (2.9)    Urine (mL/kg/hr) 1800 (0.9) 550 (0.3) 100 (0.2)    Emesis/NG output   0    Drains 360 15     Stool 1000 1000 250    Total Output 3160 1565 350    Net +599.5 -1085 -110           Emesis Occurrence   150 x          Physical Exam   Constitutional:   Chronically ill   Eyes: Pupils are equal, round, and reactive to light.   Neck: Normal range of motion. Neck supple.   Cardiovascular: Normal rate and regular rhythm.   Pulmonary/Chest: Effort normal.   Abdominal: Soft.   Drain with serous output, right upper quadrant ileostomy with  more formed output   Skin: Skin is warm and dry.   Vitals reviewed.      Significant Labs:  CBC:   Recent Labs   Lab 12/27/18  1455   WBC 9.45   RBC 4.32*   HGB 11.7*   HCT 36.4*      MCV 84   MCH 27.1   MCHC 32.1     BMP:   Recent Labs   Lab 12/30/18  0601  01/02/19  0419   GLU 92   < > 113*   *   < > 134*   K 5.3*   < > 5.2*      < > 111*   CO2 11*   < > 17*   BUN 19   < > 17   CREATININE 1.7*   < > 1.5*   CALCIUM 9.2   < > 9.4   MG 1.3*  --   --     < > = values in this interval not displayed.       Significant Diagnostics:  None

## 2019-01-02 NOTE — CONSULTS
CM previously initiated home health referral to [facility] to resume services.  Home health order sent to [facility] via [facility] care

## 2019-01-02 NOTE — NURSING
KINDRA drain milked and emptied. Changed dressing. Tolerated well. No distress noted. Will continue to monitor.

## 2019-01-02 NOTE — PROGRESS NOTES
"[facility] -   Nephrology  Progress Note    Patient Name: Yan Choudhury  MRN: [id number]  Admission Date: 12/28/2018  Hospital Length of Stay: 4 days  Attending Provider: Kevin Lindsay MD   Primary Care Physician: Sandra Estevez MD  Principal Problem:Hyponatremia    Subjective:     HPI: Pt was seen and examined. H/o reviewed. Pt is a 64 y/o male was admitted for hyponatremia with s Na of 117. 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 He is s/p closure and colostomy, then colostomy reversal and ileostomy. He has been experiencing large ileostomy output. On admit, he received hypertonic saline, followed by NS IVF's at 100 ml/hour to replace large ileostomy output losses. 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 Pt does not restrict slat intake at home. He has no SOB, no leg swelling. He is on bumex 2 mg po qd. He is noted to have non-AG metabolic acidosis.    Interval History: Pt was seen and examined. No new c/o's, stable last pm. Per nurse, ileostomy out put is less than before. No SOB.    Review of patient's allergies indicates:  No Known Allergies  Current Facility-Administered Medications   Medication Frequency    acetaminophen tablet 650 mg Q8H PRN    allopurinol tablet 100 mg Daily    bimatoprost 0.01 % ophthalmic drops 1 drop QHS    dextrose 50% injection 12.5 g PRN    dextrose 50% injection 25 g PRN    diphenoxylate-atropine 2.5-0.025 mg per tablet 1 tablet QID    enoxaparin injection 40 mg Daily    glucagon (human recombinant) injection 1 mg PRN    glucose chewable tablet 16 g PRN    glucose chewable tablet 24 g PRN    HYDROcodone-acetaminophen 5-325 mg per tablet 1 tablet Q6H PRN    insulin aspart U-100 pen 1-10 Units QID (AC + HS) PRN    lidocaine (PF) 10 mg/ml (1%) injection 10 mg Once    ondansetron disintegrating tablet 8 mg Q8H PRN    " pantoprazole EC tablet 40 mg Daily    promethazine (PHENERGAN) 6.25 mg in dextrose 5 % 50 mL IVPB Q6H PRN    ramelteon tablet 8 mg Nightly PRN    simvastatin tablet 10 mg QHS    sodium bicarbonate tablet 1,950 mg TID    sodium chloride 0.9% flush 3 mL PRN       Objective:     Vital Signs (Most Recent):  Temp: 97.8 °F (36.6 °C) (01/02/19 0856)  Pulse: 97 (01/02/19 0856)  Resp: 18 (01/02/19 0856)  BP: 109/65 (01/02/19 0856)  SpO2: 100 % (01/02/19 0856)  O2 Device (Oxygen Therapy): room air (01/02/19 0856) Vital Signs (24h Range):  Temp:  [97 °F (36.1 °C)-98.2 °F (36.8 °C)] 97.8 °F (36.6 °C)  Pulse:  [] 97  Resp:  [18-20] 18  SpO2:  [99 %-100 %] 100 %  BP: (105-119)/(59-71) 109/65     Weight: 82.1 kg (180 lb 16 oz) (12/28/18 1300)  Body mass index is 27.52 kg/m².  Body surface area is 1.98 meters squared.    I/O last 3 completed shifts:  In: 960 [P.O.:960]  Out: 2375 [Urine:1050; Drains:25; Stool:1300]    Physical Exam   Constitutional: He appears well-developed and well-nourished. No distress.   HENT:   Head: Normocephalic and atraumatic.   Cardiovascular: Normal rate, regular rhythm and normal heart sounds. Exam reveals no friction rub.   Pulmonary/Chest: Effort normal and breath sounds normal. No respiratory distress. He has no wheezes. He has no rales.   Abdominal: Soft. Bowel sounds are normal. He exhibits no distension. There is no tenderness.   Musculoskeletal: He exhibits no edema.   Neurological: He is alert.   Skin: Skin is warm and dry.   Psychiatric: He has a normal mood and affect. His behavior is normal. Thought content normal.   Nursing note and vitals reviewed.      Significant Labs: reviewed  BMP  Lab Results   Component Value Date     (L) 01/02/2019    K 5.2 (H) 01/02/2019     (H) 01/02/2019    CO2 17 (L) 01/02/2019    BUN 17 01/02/2019    CREATININE 1.5 (H) 01/02/2019    CALCIUM 9.4 01/02/2019    ANIONGAP 6 (L) 01/02/2019    ESTGFRAFRICA 56 (A) 01/02/2019    EGFRNONAA 48 (A)  01/02/2019         Significant Imaging: reviewed    Assessment/Plan:         64 y/o male with hyponatremia, high ileostomy out-put and past h/o of CHF:           * Hyponatremia     Hyponatremia: further improved with current mgmt and with improvement in ileostomy out put  Still is euvolemic today, because the high ileostomy out-put is balancing the hypervolemia from CHF.    Metabolic acidosis (normal AG) due to ileostomy losses, improved  Being replaced with bicarbonate 650 mg  Will increase PO dose from 2 tid to 3 tid  S/p IV bicarbonate 1.5 amp   Bumex is on hold, will need to restart at some stage as pt also has CHF     To review  Pt has a complicated presentation (both CHF - has moderate to severe CHF - and GI volume loss can cause hyponatremia)  Prior records show that he also has chronic hyponatremia (as low as Na 129), likely due to CHF  Current s na is close to prior baseline, chronic hyponatremia  In chronic hyponatremia, no matter what the cause it, s Na should not be corrected rapidly  s Na should be corrected with the same rate as it developed                Plans and recommendations:  As discussed above  Increase PO bicarbonate 650 mg to 3 tid with meals  OK to d/c home  Do not restart ACE-I/ARB  Noted surgery note (pt may not reach s Na of 136 easily, as has CHF, current s Na is pt's baseline, has chronic hyponatremia)               Juan Luis Galeas MD  Nephrology  [facility]

## 2019-01-03 LAB
ANION GAP SERPL CALC-SCNC: 7 MMOL/L
ANION GAP SERPL CALC-SCNC: 7 MMOL/L
BUN SERPL-MCNC: 19 MG/DL
BUN SERPL-MCNC: 19 MG/DL
CALCIUM SERPL-MCNC: 10.4 MG/DL
CALCIUM SERPL-MCNC: 9.6 MG/DL
CHLORIDE SERPL-SCNC: 107 MMOL/L
CHLORIDE SERPL-SCNC: 107 MMOL/L
CO2 SERPL-SCNC: 20 MMOL/L
CO2 SERPL-SCNC: 20 MMOL/L
CREAT SERPL-MCNC: 1.7 MG/DL
CREAT SERPL-MCNC: 1.8 MG/DL
EST. GFR  (AFRICAN AMERICAN): 45 ML/MIN/1.73 M^2
EST. GFR  (AFRICAN AMERICAN): 48 ML/MIN/1.73 M^2
EST. GFR  (NON AFRICAN AMERICAN): 39 ML/MIN/1.73 M^2
EST. GFR  (NON AFRICAN AMERICAN): 41 ML/MIN/1.73 M^2
GLUCOSE SERPL-MCNC: 119 MG/DL
GLUCOSE SERPL-MCNC: 136 MG/DL
POCT GLUCOSE: 114 MG/DL (ref 70–110)
POCT GLUCOSE: 123 MG/DL (ref 70–110)
POCT GLUCOSE: 138 MG/DL (ref 70–110)
POCT GLUCOSE: 139 MG/DL (ref 70–110)
POTASSIUM SERPL-SCNC: 5.9 MMOL/L
POTASSIUM SERPL-SCNC: 6 MMOL/L
SODIUM SERPL-SCNC: 134 MMOL/L
SODIUM SERPL-SCNC: 134 MMOL/L

## 2019-01-03 PROCEDURE — 36415 COLL VENOUS BLD VENIPUNCTURE: CPT

## 2019-01-03 PROCEDURE — 63600175 PHARM REV CODE 636 W HCPCS: Performed by: SURGERY

## 2019-01-03 PROCEDURE — 25000003 PHARM REV CODE 250: Performed by: SURGERY

## 2019-01-03 PROCEDURE — 80048 BASIC METABOLIC PNL TOTAL CA: CPT

## 2019-01-03 PROCEDURE — 99233 PR SUBSEQUENT HOSPITAL CARE,LEVL III: ICD-10-PCS | Mod: ,,, | Performed by: INTERNAL MEDICINE

## 2019-01-03 PROCEDURE — 80048 BASIC METABOLIC PNL TOTAL CA: CPT | Mod: 91

## 2019-01-03 PROCEDURE — 99233 SBSQ HOSP IP/OBS HIGH 50: CPT | Mod: ,,, | Performed by: INTERNAL MEDICINE

## 2019-01-03 PROCEDURE — 25000003 PHARM REV CODE 250: Performed by: INTERNAL MEDICINE

## 2019-01-03 PROCEDURE — 11000001 HC ACUTE MED/SURG PRIVATE ROOM

## 2019-01-03 PROCEDURE — 96372 THER/PROPH/DIAG INJ SC/IM: CPT

## 2019-01-03 RX ADMIN — SODIUM BICARBONATE 650 MG TABLET 1950 MG: at 05:01

## 2019-01-03 RX ADMIN — SIMVASTATIN 10 MG: 5 TABLET, FILM COATED ORAL at 08:01

## 2019-01-03 RX ADMIN — ENOXAPARIN SODIUM 40 MG: 100 INJECTION SUBCUTANEOUS at 05:01

## 2019-01-03 RX ADMIN — BIMATOPROST 1 DROP: 0.1 SOLUTION/ DROPS OPHTHALMIC at 08:01

## 2019-01-03 RX ADMIN — SODIUM POLYSTYRENE SULFONATE 30 G: 15 SUSPENSION ORAL; RECTAL at 10:01

## 2019-01-03 RX ADMIN — DIPHENOXYLATE HYDROCHLORIDE AND ATROPINE SULFATE 1 TABLET: 2.5; .025 TABLET ORAL at 09:01

## 2019-01-03 RX ADMIN — SODIUM BICARBONATE 650 MG TABLET 1950 MG: at 08:01

## 2019-01-03 RX ADMIN — ALLOPURINOL 100 MG: 100 TABLET ORAL at 09:01

## 2019-01-03 RX ADMIN — DIPHENOXYLATE HYDROCHLORIDE AND ATROPINE SULFATE 1 TABLET: 2.5; .025 TABLET ORAL at 10:01

## 2019-01-03 RX ADMIN — PANTOPRAZOLE SODIUM 40 MG: 40 TABLET, DELAYED RELEASE ORAL at 09:01

## 2019-01-03 RX ADMIN — SODIUM POLYSTYRENE SULFONATE 30 G: 15 SUSPENSION ORAL; RECTAL at 08:01

## 2019-01-03 RX ADMIN — DIPHENOXYLATE HYDROCHLORIDE AND ATROPINE SULFATE 1 TABLET: 2.5; .025 TABLET ORAL at 05:01

## 2019-01-03 RX ADMIN — DIPHENOXYLATE HYDROCHLORIDE AND ATROPINE SULFATE 1 TABLET: 2.5; .025 TABLET ORAL at 01:01

## 2019-01-03 RX ADMIN — SODIUM BICARBONATE 650 MG TABLET 1950 MG: at 09:01

## 2019-01-03 NOTE — SUBJECTIVE & OBJECTIVE
Interval History: Pt was seen and examined. No new c/o's. Feels comfortable. Per nurse, ileostomy tube out put has slowed down. No SOB reported. Noted K is higher. Pt denies eating food from home.    Review of patient's allergies indicates:  No Known Allergies  Current Facility-Administered Medications   Medication Frequency    acetaminophen tablet 650 mg Q8H PRN    allopurinol tablet 100 mg Daily    bimatoprost 0.01 % ophthalmic drops 1 drop QHS    dextrose 50% injection 12.5 g PRN    dextrose 50% injection 25 g PRN    diphenoxylate-atropine 2.5-0.025 mg per tablet 1 tablet QID    enoxaparin injection 40 mg Daily    glucagon (human recombinant) injection 1 mg PRN    glucose chewable tablet 16 g PRN    glucose chewable tablet 24 g PRN    HYDROcodone-acetaminophen 5-325 mg per tablet 1 tablet Q6H PRN    insulin aspart U-100 pen 1-10 Units QID (AC + HS) PRN    lidocaine (PF) 10 mg/ml (1%) injection 10 mg Once    ondansetron disintegrating tablet 8 mg Q8H PRN    pantoprazole EC tablet 40 mg Daily    promethazine (PHENERGAN) 6.25 mg in dextrose 5 % 50 mL IVPB Q6H PRN    ramelteon tablet 8 mg Nightly PRN    simvastatin tablet 10 mg QHS    sodium bicarbonate tablet 1,950 mg TID    sodium chloride 0.9% flush 3 mL PRN       Objective:     Vital Signs (Most Recent):  Temp: 98.9 °F (37.2 °C) (01/03/19 1212)  Pulse: 100 (01/03/19 1212)  Resp: 18 (01/03/19 1212)  BP: 134/67 (01/03/19 1212)  SpO2: 99 % (01/03/19 1212)  O2 Device (Oxygen Therapy): room air (01/03/19 0709) Vital Signs (24h Range):  Temp:  [97.4 °F (36.3 °C)-98.9 °F (37.2 °C)] 98.9 °F (37.2 °C)  Pulse:  [] 100  Resp:  [17-18] 18  SpO2:  [98 %-100 %] 99 %  BP: (101-134)/(56-73) 134/67     Weight: 82.1 kg (180 lb 16 oz) (12/28/18 1300)  Body mass index is 27.52 kg/m².  Body surface area is 1.98 meters squared.    I/O last 3 completed shifts:  In: 240 [P.O.:240]  Out: 2630 [Urine:900; Drains:30; Stool:1700]    Physical Exam   Constitutional:  He appears well-developed and well-nourished. No distress.   HENT:   Head: Normocephalic and atraumatic.   Cardiovascular: Normal rate, regular rhythm and normal heart sounds. Exam reveals no friction rub.   Pulmonary/Chest: Effort normal and breath sounds normal. No respiratory distress. He has no wheezes. He has no rales.   Abdominal: Soft. Bowel sounds are normal. He exhibits no distension. There is no tenderness.   Musculoskeletal: He exhibits no edema.   Neurological: He is alert.   Skin: Skin is warm and dry.   Psychiatric: He has a normal mood and affect. His behavior is normal. Thought content normal.   Nursing note and vitals reviewed.      Significant Labs: reviewed  BMP  Lab Results   Component Value Date     (L) 01/03/2019    K 6.0 (H) 01/03/2019     01/03/2019    CO2 20 (L) 01/03/2019    BUN 19 01/03/2019    CREATININE 1.8 (H) 01/03/2019    CALCIUM 9.6 01/03/2019    ANIONGAP 7 (L) 01/03/2019    ESTGFRAFRICA 45 (A) 01/03/2019    EGFRNONAA 39 (A) 01/03/2019

## 2019-01-03 NOTE — SUBJECTIVE & OBJECTIVE
Interval History:  Less ileostomy output.  Potassium is 6    Medications:  Continuous Infusions:  Scheduled Meds:   allopurinol  100 mg Oral Daily    bimatoprost  1 drop Left Eye QHS    diphenoxylate-atropine 2.5-0.025 mg  1 tablet Oral QID    enoxaparin  40 mg Subcutaneous Daily    lidocaine (PF) 10 mg/ml (1%)  1 mL Other Once    pantoprazole  40 mg Oral Daily    simvastatin  10 mg Oral QHS    sodium bicarbonate  1,950 mg Oral TID     PRN Meds:acetaminophen, dextrose 50%, dextrose 50%, glucagon (human recombinant), glucose, glucose, HYDROcodone-acetaminophen, insulin aspart U-100, ondansetron, promethazine (PHENERGAN) IVPB, ramelteon, sodium chloride 0.9%     Review of patient's allergies indicates:  No Known Allergies  Objective:     Vital Signs (Most Recent):  Temp: 98.2 °F (36.8 °C) (01/03/19 0709)  Pulse: 96 (01/03/19 0709)  Resp: 18 (01/03/19 0709)  BP: 128/67 (01/03/19 0709)  SpO2: 98 % (01/03/19 0709) Vital Signs (24h Range):  Temp:  [97.4 °F (36.3 °C)-98.9 °F (37.2 °C)] 98.2 °F (36.8 °C)  Pulse:  [] 96  Resp:  [17-18] 18  SpO2:  [98 %-100 %] 98 %  BP: (101-128)/(56-73) 128/67     Weight: 82.1 kg (180 lb 16 oz)  Body mass index is 27.52 kg/m².    Intake/Output - Last 3 Shifts       01/01 0700 - 01/02 0659 01/02 0700 - 01/03 0659 01/03 0700 - 01/04 0659    P.O. 480 240     I.V. (mL/kg)       IV Piggyback   0    Total Intake(mL/kg) 480 (5.8) 240 (2.9) 0 (0)    Urine (mL/kg/hr) 550 (0.3) 550 (0.3) 0 (0)    Emesis/NG output  0     Drains 15 20 10    Stool 1000 950 250    Total Output 1565 1520 260    Net -1085 -1280 -260           Urine Occurrence  400 x 100 x    Emesis Occurrence  150 x           Physical Exam   Constitutional: He is oriented to person, place, and time.   Chronically ill   Neck: Normal range of motion.   Cardiovascular: Normal rate, regular rhythm and normal heart sounds.   Pulmonary/Chest: Effort normal and breath sounds normal.   Abdominal: Soft. Bowel sounds are normal.    Left-sided ileostomy with semi formed output.  Drain in place. Serous output   Neurological: He is alert and oriented to person, place, and time.   Vitals reviewed.      Significant Labs:  BMP:   Recent Labs   Lab 12/30/18  0601  01/03/19  0432   GLU 92   < > 119*   *   < > 134*   K 5.3*   < > 6.0*      < > 107   CO2 11*   < > 20*   BUN 19   < > 19   CREATININE 1.7*   < > 1.8*   CALCIUM 9.2   < > 9.6   MG 1.3*  --   --     < > = values in this interval not displayed.       Significant Diagnostics:  None

## 2019-01-03 NOTE — ASSESSMENT & PLAN NOTE
62 y/o male with hyponatremia, high ileostomy out-put and past h/o of CHF:           * Hyponatremia     Hyponatremia: further improved with current mgmt and with improvement in ileostomy out put  Still is euvolemic today, because the high ileostomy out-put is balancing the hypervolemia from CHF.     Metabolic acidosis (normal AG) due to ileostomy losses, improved  Being replaced with bicarbonate 650 mg  Will increase PO dose from 2 tid to 3 tid  S/p IV bicarbonate 1.5 amp   Bumex is on hold, will need to restart at some stage as pt also has CHF     To review  Pt has a complicated presentation (both CHF - has moderate to severe CHF - and GI volume loss can cause hyponatremia)  Prior records show that he also has chronic hyponatremia (as low as Na 129), likely due to CHF  Current s na is close to prior baseline, chronic hyponatremia  In chronic hyponatremia, no matter what the cause it, s Na should not be corrected rapidly  s Na should be corrected with the same rate as it developed                Plans and recommendations:  As discussed above  Increase PO bicarbonate 650 mg to 3 tid with meals  OK to d/c home  Do not restart ACE-I/ARB  Noted surgery note (pt may not reach s Na of 136 easily, as has CHF, current s Na is pt's baseline, has chronic hyponatremia)

## 2019-01-03 NOTE — PROGRESS NOTES
[facility] -   General Surgery  Progress Note    Subjective:     History of Present Illness:  Patient underwent a colostomy reversal.  He had a leak at the anastomosis. A protective ileostomy was formed.  He has a drain in place that now has serous output.  He has had high is ileostomy outputs in the past requiring admission and rehydration.  He states that the iliac output is more solid than it has been in the past    Patient's sodium was found to be 119.  He was admitted for rehydration and correction of hyponatremia    Post-Op Info:  * No surgery found *         Interval History:  Less ileostomy output.  Potassium is 6    Medications:  Continuous Infusions:  Scheduled Meds:   allopurinol  100 mg Oral Daily    bimatoprost  1 drop Left Eye QHS    diphenoxylate-atropine 2.5-0.025 mg  1 tablet Oral QID    enoxaparin  40 mg Subcutaneous Daily    lidocaine (PF) 10 mg/ml (1%)  1 mL Other Once    pantoprazole  40 mg Oral Daily    simvastatin  10 mg Oral QHS    sodium bicarbonate  1,950 mg Oral TID     PRN Meds:acetaminophen, dextrose 50%, dextrose 50%, glucagon (human recombinant), glucose, glucose, HYDROcodone-acetaminophen, insulin aspart U-100, ondansetron, promethazine (PHENERGAN) IVPB, ramelteon, sodium chloride 0.9%     Review of patient's allergies indicates:  No Known Allergies  Objective:     Vital Signs (Most Recent):  Temp: 98.2 °F (36.8 °C) (01/03/19 0709)  Pulse: 96 (01/03/19 0709)  Resp: 18 (01/03/19 0709)  BP: 128/67 (01/03/19 0709)  SpO2: 98 % (01/03/19 0709) Vital Signs (24h Range):  Temp:  [97.4 °F (36.3 °C)-98.9 °F (37.2 °C)] 98.2 °F (36.8 °C)  Pulse:  [] 96  Resp:  [17-18] 18  SpO2:  [98 %-100 %] 98 %  BP: (101-128)/(56-73) 128/67     Weight: 82.1 kg (180 lb 16 oz)  Body mass index is 27.52 kg/m².    Intake/Output - Last 3 Shifts       01/01 0700 - 01/02 0659 01/02 0700 - 01/03 0659 01/03 0700 - 01/04 0659    P.O. 480 240     I.V. (mL/kg)       IV Piggyback   0    Total  Intake(mL/kg) 480 (5.8) 240 (2.9) 0 (0)    Urine (mL/kg/hr) 550 (0.3) 550 (0.3) 0 (0)    Emesis/NG output  0     Drains 15 20 10    Stool 1000 950 250    Total Output 1565 1520 260    Net -1085 -1280 -260           Urine Occurrence  400 x 100 x    Emesis Occurrence  150 x           Physical Exam   Constitutional: He is oriented to person, place, and time.   Chronically ill   Neck: Normal range of motion.   Cardiovascular: Normal rate, regular rhythm and normal heart sounds.   Pulmonary/Chest: Effort normal and breath sounds normal.   Abdominal: Soft. Bowel sounds are normal.   Left-sided ileostomy with semi formed output.  Drain in place. Serous output   Neurological: He is alert and oriented to person, place, and time.   Vitals reviewed.      Significant Labs:  BMP:   Recent Labs   Lab 12/30/18  0601  01/03/19  0432   GLU 92   < > 119*   *   < > 134*   K 5.3*   < > 6.0*      < > 107   CO2 11*   < > 20*   BUN 19   < > 19   CREATININE 1.7*   < > 1.8*   CALCIUM 9.2   < > 9.6   MG 1.3*  --   --     < > = values in this interval not displayed.       Significant Diagnostics:  None    Assessment/Plan:     * Hyponatremia    Improving, monitor with daily labs.    Sodium 134     Hyperkalemia    Monitor, management per nephrology     Ileostomy in place    Monitor ileostomy output, output is getting thicker  Continue Lomotil, may need to add Imodium to get output under 1000mL/24hr  Stop Metamucil     Gastroesophageal reflux disease    PPI     Biventricular ICD (implantable cardioverter-defibrillator) in place    Monitor     Pulmonary HTN    Monitor     Ischemic cardiomyopathy    Monitor     Hypertension associated with diabetes    Hold lisinopril per nephrology recs, PRN meds         Kevin Lindsay MD  General Surgery  [facility]

## 2019-01-03 NOTE — ASSESSMENT & PLAN NOTE
Monitor ileostomy output, output is getting thicker  Continue Lomotil, may need to add Imodium to get output under 1000mL/24hr  Stop Metamucil

## 2019-01-03 NOTE — PLAN OF CARE
Problem: Adult Inpatient Plan of Care  Goal: Plan of Care Review  Outcome: Ongoing (interventions implemented as appropriate)  Fall precautions maintained, pt free from injuries/fall.  Repositions independently.  Ambulates with stand by assist.  Denies pain/nausea/vomiting.  Kayexalate given for K  Ileostomy in place, bag change and site care done.  POCT monitored, coverage given as needed.  POC and meds discussed with pt, pt verbalized understanding.  Side rails x 2, bed locked and low, phone and call light w/in reach.  Chart check done. Will cont to monitor.

## 2019-01-03 NOTE — PROGRESS NOTES
"[facility]  Nephrology  Progress Note    Patient Name: Yan Choudhury  MRN: [id number]  Admission Date: 12/28/2018  Hospital Length of Stay: 5 days  Attending Provider: Kevin Lindsay MD   Primary Care Physician: Sandra Estevez MD  Principal Problem:Hyponatremia    Subjective:     HPI: Pt was seen and examined. H/o reviewed. Pt is a 64 y/o male was admitted for hyponatremia with s Na of 117. 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 He is s/p closure and colostomy, then colostomy reversal and ileostomy. He has been experiencing large ileostomy output. On admit, he received hypertonic saline, followed by NS IVF's at 100 ml/hour to replace large ileostomy output losses. 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 Pt does not restrict slat intake at home. He has no SOB, no leg swelling. He is on bumex 2 mg po qd. He is noted to have non-AG metabolic acidosis.    Interval History: Pt was seen and examined. No new c/o's. Feels comfortable. Per nurse, ileostomy tube out put has slowed down. No SOB reported. Noted K is higher. Pt denies eating food from home.    Review of patient's allergies indicates:  No Known Allergies  Current Facility-Administered Medications   Medication Frequency    acetaminophen tablet 650 mg Q8H PRN    allopurinol tablet 100 mg Daily    bimatoprost 0.01 % ophthalmic drops 1 drop QHS    dextrose 50% injection 12.5 g PRN    dextrose 50% injection 25 g PRN    diphenoxylate-atropine 2.5-0.025 mg per tablet 1 tablet QID    enoxaparin injection 40 mg Daily    glucagon (human recombinant) injection 1 mg PRN    glucose chewable tablet 16 g PRN    glucose chewable tablet 24 g PRN    HYDROcodone-acetaminophen 5-325 mg per tablet 1 tablet Q6H PRN    insulin aspart U-100 pen 1-10 Units QID (AC + HS) PRN    lidocaine (PF) 10 mg/ml (1%) injection " 10 mg Once    ondansetron disintegrating tablet 8 mg Q8H PRN    pantoprazole EC tablet 40 mg Daily    promethazine (PHENERGAN) 6.25 mg in dextrose 5 % 50 mL IVPB Q6H PRN    ramelteon tablet 8 mg Nightly PRN    simvastatin tablet 10 mg QHS    sodium bicarbonate tablet 1,950 mg TID    sodium chloride 0.9% flush 3 mL PRN       Objective:     Vital Signs (Most Recent):  Temp: 98.9 °F (37.2 °C) (01/03/19 1212)  Pulse: 100 (01/03/19 1212)  Resp: 18 (01/03/19 1212)  BP: 134/67 (01/03/19 1212)  SpO2: 99 % (01/03/19 1212)  O2 Device (Oxygen Therapy): room air (01/03/19 0709) Vital Signs (24h Range):  Temp:  [97.4 °F (36.3 °C)-98.9 °F (37.2 °C)] 98.9 °F (37.2 °C)  Pulse:  [] 100  Resp:  [17-18] 18  SpO2:  [98 %-100 %] 99 %  BP: (101-134)/(56-73) 134/67     Weight: 82.1 kg (180 lb 16 oz) (12/28/18 1300)  Body mass index is 27.52 kg/m².  Body surface area is 1.98 meters squared.    I/O last 3 completed shifts:  In: 240 [P.O.:240]  Out: 2630 [Urine:900; Drains:30; Stool:1700]    Physical Exam   Constitutional: He appears well-developed and well-nourished. No distress.   HENT:   Head: Normocephalic and atraumatic.   Cardiovascular: Normal rate, regular rhythm and normal heart sounds. Exam reveals no friction rub.   Pulmonary/Chest: Effort normal and breath sounds normal. No respiratory distress. He has no wheezes. He has no rales.   Abdominal: Soft. Bowel sounds are normal. He exhibits no distension. There is no tenderness.   Musculoskeletal: He exhibits no edema.   Neurological: He is alert.   Skin: Skin is warm and dry.   Psychiatric: He has a normal mood and affect. His behavior is normal. Thought content normal.   Nursing note and vitals reviewed.      Significant Labs: reviewed  BMP  Lab Results   Component Value Date     (L) 01/03/2019    K 6.0 (H) 01/03/2019     01/03/2019    CO2 20 (L) 01/03/2019    BUN 19 01/03/2019    CREATININE 1.8 (H) 01/03/2019    CALCIUM 9.6 01/03/2019    ANIONGAP 7 (L)  "01/03/2019    ESTGFRAFRICA 45 (A) 01/03/2019    EGFRNONAA 39 (A) 01/03/2019         Assessment/Plan:         62 y/o male with hyponatremia, high ileostomy out-put and past h/o of CHF:           * Hyponatremia     Hyponatremia: stable and improved    Please note that this pt has chronic hyponatremia (about 130)  This is due to severe heart failure (LV dysfunction with EF 30% as well as RV dysfunction and diastolic CHF)  Therefore there is no indication to "normalize" s Na  s Na will drop low again with CHF    s Na further worsened due to high output from the ileostomy    Hypovolemia was corrected. Pt is now euvolemic.    From the point of view of s Na, pt can be d'/vidya home.   Pt likely in future will need to re-start bumex that he was on, but tight now, he likely does not need it because he is euvolemic        Other issues are:    Metabolic acidosis (normal AG) due to ileostomy losses, improved  Being replaced with bicarbonate 650 mg  On PO dose from 3 tid  S/p IV bicarbonate 1.5 amp     Hyperkalemia:   Previously on ARB. Was stopped. Do not re-start  Has DM. Even without the ARB, had borderline hyperkalemia.  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  Continue bicarbonate PO  Give kayexelate this afternoon  May also give some NS IVF's (watch for CHF)            Plans and recommendations:  As discussed above         Juan Luis Galeas MD  Nephrology  [facility]  "

## 2019-01-03 NOTE — PLAN OF CARE
Problem: Adult Inpatient Plan of Care  Goal: Plan of Care Review  Outcome: Ongoing (interventions implemented as appropriate)  Patient remained free from injury. Blood glucose monitored. No s/s of acute distress. 12hr chart check complete.

## 2019-01-04 VITALS
RESPIRATION RATE: 18 BRPM | HEART RATE: 93 BPM | OXYGEN SATURATION: 99 % | WEIGHT: 177.94 LBS | TEMPERATURE: 98 F | SYSTOLIC BLOOD PRESSURE: 111 MMHG | BODY MASS INDEX: 26.97 KG/M2 | HEIGHT: 68 IN | DIASTOLIC BLOOD PRESSURE: 71 MMHG

## 2019-01-04 LAB
ANION GAP SERPL CALC-SCNC: 9 MMOL/L
BUN SERPL-MCNC: 21 MG/DL
CALCIUM SERPL-MCNC: 10.4 MG/DL
CHLORIDE SERPL-SCNC: 106 MMOL/L
CO2 SERPL-SCNC: 20 MMOL/L
CREAT SERPL-MCNC: 2 MG/DL
EST. GFR  (AFRICAN AMERICAN): 39 ML/MIN/1.73 M^2
EST. GFR  (NON AFRICAN AMERICAN): 34 ML/MIN/1.73 M^2
GLUCOSE SERPL-MCNC: 126 MG/DL
POCT GLUCOSE: 121 MG/DL (ref 70–110)
POTASSIUM SERPL-SCNC: 5.3 MMOL/L
SODIUM SERPL-SCNC: 135 MMOL/L

## 2019-01-04 PROCEDURE — 25000003 PHARM REV CODE 250: Performed by: INTERNAL MEDICINE

## 2019-01-04 PROCEDURE — 36415 COLL VENOUS BLD VENIPUNCTURE: CPT

## 2019-01-04 PROCEDURE — 80048 BASIC METABOLIC PNL TOTAL CA: CPT

## 2019-01-04 PROCEDURE — 25000003 PHARM REV CODE 250: Performed by: SURGERY

## 2019-01-04 RX ORDER — SODIUM BICARBONATE 650 MG/1
1950 TABLET ORAL 3 TIMES DAILY
Qty: 270 TABLET | Refills: 11 | COMMUNITY
Start: 2019-01-04 | End: 2019-01-05 | Stop reason: SDUPTHER

## 2019-01-04 RX ADMIN — ALLOPURINOL 100 MG: 100 TABLET ORAL at 09:01

## 2019-01-04 RX ADMIN — SODIUM BICARBONATE 650 MG TABLET 1950 MG: at 09:01

## 2019-01-04 RX ADMIN — DIPHENOXYLATE HYDROCHLORIDE AND ATROPINE SULFATE 1 TABLET: 2.5; .025 TABLET ORAL at 09:01

## 2019-01-04 RX ADMIN — PANTOPRAZOLE SODIUM 40 MG: 40 TABLET, DELAYED RELEASE ORAL at 09:01

## 2019-01-04 NOTE — DISCHARGE SUMMARY
[facility] -   General Surgery  Discharge Summary      Patient Name: Yan Choudhury  MRN: [id number]  Admission Date: 12/28/2018  Hospital Length of Stay: 6 days  Discharge Date and Time:  01/04/2019 8:40 AM  Attending Physician: Kevin Lindsay MD   Discharging Provider: Kevin Lindsay MD  Primary Care Provider: Sandra Estevez MD    HPI:   Patient underwent a colostomy reversal.  He had a leak at the anastomosis. A protective ileostomy was formed.  He has a drain in place that now has serous output.  He has had high is ileostomy outputs in the past requiring admission and rehydration.  He states that the iliac output is more solid than it has been in the past    Patient's sodium was found to be 119.  He was admitted for rehydration and correction of hyponatremia    * No surgery found *      Indwelling Lines/Drains at time of discharge:   Lines/Drains/Airways     Drain                 Ileostomy 10/30/18 Loop RUQ 66 days         Closed/Suction Drain 10/30/18 1300 Abdomen Bulb 65 days              Hospital Course: Patient is feeling well as tolerating a diet.  His sodium is minimally improved    12/30/2018.  Patient is feeling better.  Sodium is improving.  Minimal drain output.  Ileostomy 2.1 L (Metamucil was being held)    1231.  Sodium is still 125 pill ordered ileostomy was 1.2 L off Metamucil.  No significant pain    01/01/2019: Sodium improved to 132 today. Renal consulted yesterday and recs inputed. Tolerating diet with ostomy output of 1.0L.     01/02/2019.  Off Metamucil the ileostomy output is approximately 1 L.  Sodium is nearly normal\ /03/2019.  Ileostomy output 1.2 L.  It is semi solid.  Sodium is near normal.  Potassium is 6, will defer to nephrology  1/4/19:  Ileostomy output, but given Kayexalate, other wise  Ileostomy output was about 1 l for last 2 day, potassium imporved    Consults:   Consults (From admission, onward)        Status Ordering Provider     Inpatient consult to Nephrology   Once     Provider:  Will Alvarado MD    Acknowledged COLBY DAVIES     Inpatient consult to Social Work/Case Management  Once     Provider:  (Not yet assigned)    RICARDO Zepeda          Significant Diagnostic Studies: Labs:   BMP:   Recent Labs   Lab 01/03/19  0432 01/03/19  1624 01/04/19  0446   * 136* 126*   * 134* 135*   K 6.0* 5.9* 5.3*    107 106   CO2 20* 20* 20*   BUN 19 19 21   CREATININE 1.8* 1.7* 2.0*   CALCIUM 9.6 10.4 10.4   , CMP   Recent Labs   Lab 01/03/19  0432 01/03/19  1624 01/04/19  0446   * 134* 135*   K 6.0* 5.9* 5.3*    107 106   CO2 20* 20* 20*   * 136* 126*   BUN 19 19 21   CREATININE 1.8* 1.7* 2.0*   CALCIUM 9.6 10.4 10.4   ANIONGAP 7* 7* 9   ESTGFRAFRICA 45* 48* 39*   EGFRNONAA 39* 41* 34*    and CBC No results for input(s): WBC, HGB, HCT, PLT in the last 48 hours.    Pending Diagnostic Studies:     None        Final Active Diagnoses:    Diagnosis Date Noted POA    PRINCIPAL PROBLEM:  Hyponatremia [E87.1] 12/28/2018 Yes    Metabolic acidosis [E87.2]  Yes    H/O ileostomy [Z98.890]  Not Applicable    Hyperkalemia [E87.5] 12/29/2018 Yes    Alteration in skin integrity [R23.9] 12/12/2018 Yes    Ileostomy in place [Z93.2] 11/05/2018 Not Applicable    Chronic systolic heart failure [I50.22] 03/06/2018 Unknown    Gastroesophageal reflux disease [K21.9] 11/17/2016 Yes    Biventricular ICD (implantable cardioverter-defibrillator) in place [Z95.810] 04/19/2016 Yes     Chronic    Hypertension associated with diabetes [E11.59, I10]  Yes    Ischemic cardiomyopathy [I25.5]  Yes    Pulmonary HTN [I27.20]  Yes      Problems Resolved During this Admission:      Discharged Condition: stable    Disposition:     Follow Up:    Patient Instructions:   No discharge procedures on file.  Medications:  Reconciled Home Medications:      Medication List      START taking these medications    sodium bicarbonate 650 MG tablet  Take 3 tablets (2,950  "mg total) by mouth 3 (three) times daily.        CHANGE how you take these medications    allopurinol 100 MG tablet  Commonly known as:  ZYLOPRIM  TAKE 1 TABLET (100 MG TOTAL) BY MOUTH ONCE DAILY.  What changed:    · how much to take  · how to take this  · when to take this     bimatoprost 0.03 % ophthalmic drops  Commonly known as:  LUMIGAN  Place 1 drop into the left eye every evening.  What changed:  how to take this     bumetanide 2 MG tablet  Commonly known as:  BUMEX  Take 1 tablet (2 mg total) by mouth 2 (two) times daily.  What changed:  when to take this     lisinopril 5 MG tablet  Commonly known as:  PRINIVIL,ZESTRIL  TAKE 1 TABLET (5 MG TOTAL) BY MOUTH ONCE DAILY.  What changed:  when to take this        CONTINUE taking these medications    aspirin 81 MG EC tablet  Commonly known as:  ECOTRIN  Take 81 mg by mouth every morning.     BD INSULIN SYRINGE ULTRA-FINE 0.5 mL 31 gauge x 5/16" Syrg  Generic drug:  insulin syringe-needle U-100  USE AS DIRECTED TO INJECT INSULIN TWO TIMES DAILY     blood sugar diagnostic Strp  1 strip by Misc.(Non-Drug; Combo Route) route 4 (four) times daily. Telcare     carvedilol 25 MG tablet  Commonly known as:  COREG  TAKE 1/2 TABLET BY MOUTH TWICE A DAY WITH MEALS     diphenoxylate-atropine 2.5-0.025 mg 2.5-0.025 mg per tablet  Commonly known as:  LOMOTIL  Take 1 tablet by mouth 4 (four) times daily. for 10 days     GERITOL ORAL  Take by mouth.     HYDROcodone-acetaminophen 5-325 mg per tablet  Commonly known as:  NORCO  One or 2 every 4-6 hours as needed for pain     insulin glargine 100 unit/mL injection  Commonly known as:  LANTUS  50 units bid prn when BS< 150     lancets Misc  For tel care     pantoprazole 40 MG tablet  Commonly known as:  PROTONIX  TAKE 1 TABLET BY MOUTH EVERY DAY FOR ACID REFLUX     simvastatin 10 MG tablet  Commonly known as:  ZOCOR  TAKE 1 TABLET (10 MG TOTAL) BY MOUTH EVERY EVENING.     VITAMIN D3 1,000 unit capsule  Generic drug:  cholecalciferol " "(vitamin D3)  Take 1,000 Units by mouth once daily.        STOP taking these medications    amoxicillin 875 MG tablet  Commonly known as:  AMOXIL     psyllium husk (aspartame) 3.4 gram Pwpk packet  Commonly known as:  METAMUCIL        ASK your doctor about these medications    pen needle, diabetic 32 gauge x 5/32" Ndle  Commonly known as:  BD ULTRA-FINE JOSLYN PEN NEEDLE  1 each by Misc.(Non-Drug; Combo Route) route 4 (four) times daily.          Time spent on the discharge of patient: 5 minutes    Kevin Lindsay MD  General Surgery  [facility]  "

## 2019-01-04 NOTE — SUBJECTIVE & OBJECTIVE
Interval History: high ileostomy output likely from Kayexelatye    Medications:  Continuous Infusions:  Scheduled Meds:   allopurinol  100 mg Oral Daily    bimatoprost  1 drop Left Eye QHS    diphenoxylate-atropine 2.5-0.025 mg  1 tablet Oral QID    enoxaparin  40 mg Subcutaneous Daily    lidocaine (PF) 10 mg/ml (1%)  1 mL Other Once    pantoprazole  40 mg Oral Daily    simvastatin  10 mg Oral QHS    sodium bicarbonate  1,950 mg Oral TID     PRN Meds:acetaminophen, dextrose 50%, dextrose 50%, glucagon (human recombinant), glucose, glucose, HYDROcodone-acetaminophen, insulin aspart U-100, ondansetron, promethazine (PHENERGAN) IVPB, ramelteon, sodium chloride 0.9%     Review of patient's allergies indicates:  No Known Allergies  Objective:     Vital Signs (Most Recent):  Temp: 98 °F (36.7 °C) (01/04/19 0729)  Pulse: 93 (01/04/19 0729)  Resp: 18 (01/04/19 0729)  BP: 111/71 (01/04/19 0729)  SpO2: 99 % (01/04/19 0729) Vital Signs (24h Range):  Temp:  [97.5 °F (36.4 °C)-98.9 °F (37.2 °C)] 98 °F (36.7 °C)  Pulse:  [] 93  Resp:  [18] 18  SpO2:  [98 %-100 %] 99 %  BP: ()/(60-75) 111/71     Weight: 82.1 kg (180 lb 16 oz)  Body mass index is 27.52 kg/m².    Intake/Output - Last 3 Shifts       01/02 0700 - 01/03 0659 01/03 0700 - 01/04 0659 01/04 0700 - 01/05 0659    P.O. 240 840     IV Piggyback  0 0    Total Intake(mL/kg) 240 (2.9) 840 (10.2) 0 (0)    Urine (mL/kg/hr) 550 (0.3) 600 (0.3)     Emesis/NG output 0      Drains 20 25     Stool 950 3650 250    Total Output 1520 4275 250    Net -1280 -3435 -250           Urine Occurrence 400 x 700 x     Emesis Occurrence 150 x            Physical Exam   Constitutional: No distress.   Chronically ill   Neck: Normal range of motion.   Cardiovascular: Normal rate and regular rhythm.   Pulmonary/Chest: Effort normal.   Abdominal: Soft. He exhibits no distension. There is no tenderness.   Drain with serous output, ileostomy in place   Vitals reviewed.      Significant  Labs:  CBC: No results for input(s): WBC, RBC, HGB, HCT, PLT, MCV, MCH, MCHC in the last 168 hours.  BMP:   Recent Labs   Lab 12/30/18  0601  01/04/19  0446   GLU 92   < > 126*   *   < > 135*   K 5.3*   < > 5.3*      < > 106   CO2 11*   < > 20*   BUN 19   < > 21   CREATININE 1.7*   < > 2.0*   CALCIUM 9.2   < > 10.4   MG 1.3*  --   --     < > = values in this interval not displayed.       Significant Diagnostics:  none

## 2019-01-04 NOTE — PROGRESS NOTES
[facility] -   General Surgery  Progress Note    Subjective:     History of Present Illness:  Patient underwent a colostomy reversal.  He had a leak at the anastomosis. A protective ileostomy was formed.  He has a drain in place that now has serous output.  He has had high is ileostomy outputs in the past requiring admission and rehydration.  He states that the iliac output is more solid than it has been in the past    Patient's sodium was found to be 119.  He was admitted for rehydration and correction of hyponatremia    Post-Op Info:  * No surgery found *         Interval History: high ileostomy output likely from Kayexelatye    Medications:  Continuous Infusions:  Scheduled Meds:   allopurinol  100 mg Oral Daily    bimatoprost  1 drop Left Eye QHS    diphenoxylate-atropine 2.5-0.025 mg  1 tablet Oral QID    enoxaparin  40 mg Subcutaneous Daily    lidocaine (PF) 10 mg/ml (1%)  1 mL Other Once    pantoprazole  40 mg Oral Daily    simvastatin  10 mg Oral QHS    sodium bicarbonate  1,950 mg Oral TID     PRN Meds:acetaminophen, dextrose 50%, dextrose 50%, glucagon (human recombinant), glucose, glucose, HYDROcodone-acetaminophen, insulin aspart U-100, ondansetron, promethazine (PHENERGAN) IVPB, ramelteon, sodium chloride 0.9%     Review of patient's allergies indicates:  No Known Allergies  Objective:     Vital Signs (Most Recent):  Temp: 98 °F (36.7 °C) (01/04/19 0729)  Pulse: 93 (01/04/19 0729)  Resp: 18 (01/04/19 0729)  BP: 111/71 (01/04/19 0729)  SpO2: 99 % (01/04/19 0729) Vital Signs (24h Range):  Temp:  [97.5 °F (36.4 °C)-98.9 °F (37.2 °C)] 98 °F (36.7 °C)  Pulse:  [] 93  Resp:  [18] 18  SpO2:  [98 %-100 %] 99 %  BP: ()/(60-75) 111/71     Weight: 82.1 kg (180 lb 16 oz)  Body mass index is 27.52 kg/m².    Intake/Output - Last 3 Shifts       01/02 0700 - 01/03 0659 01/03 0700 - 01/04 0659 01/04 0700 - 01/05 0659    P.O. 240 840     IV Piggyback  0 0    Total Intake(mL/kg) 240 (2.9)  840 (10.2) 0 (0)    Urine (mL/kg/hr) 550 (0.3) 600 (0.3)     Emesis/NG output 0      Drains 20 25     Stool 950 3650 250    Total Output 1520 4275 250    Net -1280 -3435 -250           Urine Occurrence 400 x 700 x     Emesis Occurrence 150 x            Physical Exam   Constitutional: No distress.   Chronically ill   Neck: Normal range of motion.   Cardiovascular: Normal rate and regular rhythm.   Pulmonary/Chest: Effort normal.   Abdominal: Soft. He exhibits no distension. There is no tenderness.   Drain with serous output, ileostomy in place   Vitals reviewed.      Significant Labs:  CBC: No results for input(s): WBC, RBC, HGB, HCT, PLT, MCV, MCH, MCHC in the last 168 hours.  BMP:   Recent Labs   Lab 12/30/18  0601  01/04/19  0446   GLU 92   < > 126*   *   < > 135*   K 5.3*   < > 5.3*      < > 106   CO2 11*   < > 20*   BUN 19   < > 21   CREATININE 1.7*   < > 2.0*   CALCIUM 9.2   < > 10.4   MG 1.3*  --   --     < > = values in this interval not displayed.       Significant Diagnostics:  none    Assessment/Plan:     * Hyponatremia    Improving, monitor with daily labs.    Sodium 135     Hyperkalemia    Monitor, management per nephrology  Resolved     Ileostomy in place    Monitor ileostomy output, output is getting thicker  Continue Lomotil, may need to add Imodium to get output under 1000mL/24hr  Stop Metamucil  High output yesterday secondary to kayexelate    Will discharge with home health     Gastroesophageal reflux disease    PPI     Biventricular ICD (implantable cardioverter-defibrillator) in place    Monitor     Pulmonary HTN    Monitor     Ischemic cardiomyopathy    Monitor     Hypertension associated with diabetes    Hold lisinopril per nephrology recs, PRN meds         Kevin Lindsay MD  General Surgery  [facility]

## 2019-01-04 NOTE — DISCHARGE INSTRUCTIONS
IT IS VERY IMPORTANT THAT YOU WATCH WHAT COMES OUT OF YOUR ILEOSTOMY,  IF IS MORE THEN 1OOO ML TO 12OO ML PLEASE L3T US KNOW  WE WILL STOP THE METAMUCIL    YOU NEED TO TAKE THE LOMOTIL 4 TIMES A DAY AND LET US KNOW IF YOUR RUN OUT

## 2019-01-04 NOTE — PLAN OF CARE
01/04/19 0859   Medicare Message   Important Message from Medicare regarding Discharge Appeal Rights Given to patient/caregiver;Explained to patient/caregiver;Signed/date by patient/caregiver   Date IMM was signed 01/04/19   Time IMM was signed 0857

## 2019-01-04 NOTE — NURSING
Discharge instructions, medications, and appointments reviewed with patient and son. Both verbalized understanding.

## 2019-01-04 NOTE — PLAN OF CARE
Pt denies pain/nausea/vomiting. Dressing CDI. Ileostomy has brown watery stool. Pt demonstrated proper emptying. Instructed to measure output. Adequate for dc.

## 2019-01-04 NOTE — ASSESSMENT & PLAN NOTE
Monitor ileostomy output, output is getting thicker  Continue Lomotil, may need to add Imodium to get output under 1000mL/24hr  Stop Metamucil  High output yesterday secondary to kayexelate    Will discharge with home health

## 2019-01-04 NOTE — PLAN OF CARE
Problem: Adult Inpatient Plan of Care  Goal: Plan of Care Review  Outcome: Ongoing (interventions implemented as appropriate)  Patient remained free from injury. Ambulates with assistance. Blood glucose monitored. No s/s of acute distress. 12hr chart check complete.

## 2019-01-04 NOTE — PLAN OF CARE
Jan7 Established Patient Visit with Luther Calvillo MD   Monday Jan 7, 2019 3:20 PM  [facility] - Cardiology   [address] [address]   [phone]         01/04/19 1448   Final Note   Assessment Type Final Discharge Note   Anticipated Discharge Disposition Home-Health   Hospital Follow Up  Appt(s) scheduled? Yes   Right Care Referral Info   Post Acute Recommendation Home-care   Facility Name [facility] [place], [place]

## 2019-01-05 RX ORDER — SODIUM BICARBONATE 650 MG/1
1950 TABLET ORAL 3 TIMES DAILY
Qty: 270 TABLET | Refills: 11 | COMMUNITY
Start: 2019-01-05 | End: 2019-03-28

## 2019-01-07 ENCOUNTER — TELEPHONE (OUTPATIENT)
Dept: SURGERY | Facility: CLINIC | Age: 64
End: 2019-01-07

## 2019-01-07 ENCOUNTER — OFFICE VISIT (OUTPATIENT)
Dept: CARDIOLOGY | Facility: CLINIC | Age: 64
End: 2019-01-07
Payer: MEDICARE

## 2019-01-07 ENCOUNTER — PATIENT OUTREACH (OUTPATIENT)
Dept: ADMINISTRATIVE | Facility: CLINIC | Age: 64
End: 2019-01-07

## 2019-01-07 VITALS
HEART RATE: 76 BPM | WEIGHT: 171.75 LBS | BODY MASS INDEX: 26.03 KG/M2 | DIASTOLIC BLOOD PRESSURE: 62 MMHG | HEIGHT: 68 IN | SYSTOLIC BLOOD PRESSURE: 102 MMHG

## 2019-01-07 DIAGNOSIS — I15.2 HYPERTENSION ASSOCIATED WITH DIABETES: ICD-10-CM

## 2019-01-07 DIAGNOSIS — E78.5 HYPERLIPIDEMIA ASSOCIATED WITH TYPE 2 DIABETES MELLITUS: ICD-10-CM

## 2019-01-07 DIAGNOSIS — E11.59 HYPERTENSION ASSOCIATED WITH DIABETES: ICD-10-CM

## 2019-01-07 DIAGNOSIS — I44.7 LBBB (LEFT BUNDLE BRANCH BLOCK): ICD-10-CM

## 2019-01-07 DIAGNOSIS — I50.22 CHRONIC SYSTOLIC HEART FAILURE: ICD-10-CM

## 2019-01-07 DIAGNOSIS — E66.9 OBESITY (BMI 30.0-34.9): ICD-10-CM

## 2019-01-07 DIAGNOSIS — N17.9 ACUTE RENAL FAILURE SUPERIMPOSED ON STAGE 4 CHRONIC KIDNEY DISEASE, UNSPECIFIED ACUTE RENAL FAILURE TYPE: ICD-10-CM

## 2019-01-07 DIAGNOSIS — N18.4 CKD (CHRONIC KIDNEY DISEASE) STAGE 4, GFR 15-29 ML/MIN: ICD-10-CM

## 2019-01-07 DIAGNOSIS — Z95.810 BIVENTRICULAR ICD (IMPLANTABLE CARDIOVERTER-DEFIBRILLATOR) IN PLACE: Primary | Chronic | ICD-10-CM

## 2019-01-07 DIAGNOSIS — I25.5 ISCHEMIC CARDIOMYOPATHY: ICD-10-CM

## 2019-01-07 DIAGNOSIS — I27.20 PULMONARY HTN: ICD-10-CM

## 2019-01-07 DIAGNOSIS — N18.4 ACUTE RENAL FAILURE SUPERIMPOSED ON STAGE 4 CHRONIC KIDNEY DISEASE, UNSPECIFIED ACUTE RENAL FAILURE TYPE: ICD-10-CM

## 2019-01-07 DIAGNOSIS — I25.10 CAD, MULTIPLE VESSEL: Chronic | ICD-10-CM

## 2019-01-07 DIAGNOSIS — E11.69 HYPERLIPIDEMIA ASSOCIATED WITH TYPE 2 DIABETES MELLITUS: ICD-10-CM

## 2019-01-07 PROCEDURE — 99999 PR PBB SHADOW E&M-EST. PATIENT-LVL III: CPT | Mod: PBBFAC,,, | Performed by: INTERNAL MEDICINE

## 2019-01-07 PROCEDURE — 99214 OFFICE O/P EST MOD 30 MIN: CPT | Mod: S$PBB,,, | Performed by: INTERNAL MEDICINE

## 2019-01-07 PROCEDURE — 99213 OFFICE O/P EST LOW 20 MIN: CPT | Mod: PBBFAC,PO | Performed by: INTERNAL MEDICINE

## 2019-01-07 PROCEDURE — 99214 PR OFFICE/OUTPT VISIT, EST, LEVL IV, 30-39 MIN: ICD-10-PCS | Mod: S$PBB,,, | Performed by: INTERNAL MEDICINE

## 2019-01-07 PROCEDURE — 99999 PR PBB SHADOW E&M-EST. PATIENT-LVL III: ICD-10-PCS | Mod: PBBFAC,,, | Performed by: INTERNAL MEDICINE

## 2019-01-07 NOTE — TELEPHONE ENCOUNTER
"----- Message from Brenda Youngblood RN sent at 1/7/2019 11:36 AM CST -----  Just spoke with the wife of Yan Choudhury for a hospital follow-up call.  Wife is requesting that the order for Sodium Bicarb po TID be "called in" to the pharmacy instead of being listed as OTC so his insurance can pay for it.  Please call in to [facility] listed on his chart.  Thank you so much  Brenda Youngblood RN  Care Coord Call Center  "

## 2019-01-07 NOTE — PROGRESS NOTES
Subjective:    Patient ID:  Yan Choudhury is a 63 y.o. male who presents for evaluation of Hypertension (3 mth f/u); Hyperlipidemia; and Coronary Artery Disease          HPI Mr. Choudhury returns for f/u.  His current medical conditions include CAD/ICM, DCM, LBBB, HTN, CRI, DM, CRT-ICD, PTHN, dyslipidemia, obesity. Nonsmoker.   Past hx pertinent for following:  LHC was done 2009 showed diffuse CAD, and severe distal CAD involving apical LAD, distal RCA/distal LCX.   Echo Oct 2010 showed LVEF 25%.   PET stress 2016 showed inferior scar, no significant ischemia noted and LVEF < 35%.   s/p BIV ICD 3/16 for CHF/LBBB.  Echo 12/17 EF 20%, DD, mod PHTN.   Pt here for f/u.  Echo 10/18 EF read as 30%, mild-mod AI, DD, RVE, LVE.  He is s/p colostomy reversal/parastomal repair then needed ileostomy.  He had dehydration, acute renal failure issues during his hospitalization.  Chart reviewed.  He denies dyspnea, pnd/orthopnea.  No chest pain sxs.  ICD has not fired.  He denies dizziness or presyncope.  Compliant with meds.  Weight down a lot since last appt with me 5 months ago, down 30 pounds.  Lipids well controlled, on statin.  DM HGAIC at goal.  CRI stable, creatinine 2.0  Coreg has been held due to low BP.        Current Outpatient Medications:     allopurinol (ZYLOPRIM) 100 MG tablet, TAKE 1 TABLET (100 MG TOTAL) BY MOUTH ONCE DAILY. (Patient taking differently: TAKE 1 TABLET (100 MG TOTAL) BY MOUTH ONCE DAILY./ takes in AM), Disp: 90 tablet, Rfl: 0    aspirin (ECOTRIN) 81 MG EC tablet, Take 81 mg by mouth every morning. , Disp: , Rfl:     cholecalciferol, vitamin D3, (VITAMIN D3) 1,000 unit capsule, Take 1,000 Units by mouth once daily., Disp: , Rfl:     HYDROcodone-acetaminophen (NORCO) 5-325 mg per tablet, One or 2 every 4-6 hours as needed for pain, Disp: 20 tablet, Rfl: 0    IRON/VITAMIN B COMPLEX (GERITOL ORAL), Take by mouth., Disp: , Rfl:     lisinopril (PRINIVIL,ZESTRIL) 5 MG tablet, TAKE 1 TABLET (5 MG TOTAL) BY  "MOUTH ONCE DAILY. (Patient taking differently: Take 5 mg by mouth every morning. ), Disp: 90 tablet, Rfl: 3    pantoprazole (PROTONIX) 40 MG tablet, TAKE 1 TABLET BY MOUTH EVERY DAY FOR ACID REFLUX, Disp: 90 tablet, Rfl: 1    simvastatin (ZOCOR) 10 MG tablet, TAKE 1 TABLET (10 MG TOTAL) BY MOUTH EVERY EVENING., Disp: 30 tablet, Rfl: 7    BD INSULIN SYRINGE ULTRA-FINE 0.5 mL 31 gauge x 5/16 Syrg, USE AS DIRECTED TO INJECT INSULIN TWO TIMES DAILY, Disp: 60 each, Rfl: 11    bimatoprost (LUMIGAN) 0.03 % ophthalmic drops, Place 1 drop into the left eye every evening. (Patient taking differently: Place 1 drop into the right eye every evening. ), Disp: 2.5 mL, Rfl: 11    blood sugar diagnostic Strp, 1 strip by Misc.(Non-Drug; Combo Route) route 4 (four) times daily. Telcare, Disp: 120 strip, Rfl: 11    bumetanide (BUMEX) 2 MG tablet, Take 1 tablet (2 mg total) by mouth 2 (two) times daily. (Patient taking differently: Take 2 mg by mouth every morning. ), Disp: 75 tablet, Rfl: 11    carvedilol (COREG) 25 MG tablet, TAKE 1/2 TABLET BY MOUTH TWICE A DAY WITH MEALS, Disp: 30 tablet, Rfl: 9    insulin glargine (LANTUS) 100 unit/mL injection, 50 units bid prn when BS< 150, Disp: 30 mL, Rfl: 11    lancets [facility], For tel care, Disp: 120 each, Rfl: 11    pen needle, diabetic (BD ULTRA-FINE JOSLYN PEN NEEDLE) 32 gauge x 5/32" Ndle, 1 each by Misc.(Non-Drug; Combo Route) route 4 (four) times daily., Disp: 100 each, Rfl: 11    sodium bicarbonate 650 MG tablet, Take 3 tablets (1,950 mg total) by mouth 3 (three) times daily., Disp: 270 tablet, Rfl: 11      Review of Systems   Constitution: Positive for malaise/fatigue and weight loss.   HENT: Negative.    Eyes: Negative.    Cardiovascular: Negative.    Respiratory: Negative.    Endocrine: Negative.    Hematologic/Lymphatic: Negative.    Skin: Negative.    Musculoskeletal: Positive for arthritis.   Gastrointestinal: Negative.    Genitourinary: Negative.    Neurological: Negative. " "   Psychiatric/Behavioral: Negative.    Allergic/Immunologic: Negative.        /62 (Patient Position: Sitting, BP Method: Small (Manual))   Pulse 76   Ht 5' 8" (1.727 m)   Wt 77.9 kg (171 lb 11.8 oz)   BMI 26.11 kg/m²     Wt Readings from Last 3 Encounters:   01/07/19 77.9 kg (171 lb 11.8 oz)   01/04/19 80.7 kg (177 lb 14.6 oz)   12/27/18 80.7 kg (178 lb)     Temp Readings from Last 3 Encounters:   01/04/19 98 °F (36.7 °C) (Oral)   12/27/18 98.1 °F (36.7 °C)   12/16/18 98.1 °F (36.7 °C) (Oral)     BP Readings from Last 3 Encounters:   01/07/19 102/62   01/04/19 111/71   12/27/18 125/65     Pulse Readings from Last 3 Encounters:   01/07/19 76   01/04/19 93   12/27/18 68          Objective:    Physical Exam   Constitutional: He is oriented to person, place, and time. He appears well-developed and well-nourished.   HENT:   Head: Normocephalic.   Neck: Normal range of motion. Neck supple. Normal carotid pulses, no hepatojugular reflux and no JVD present. Carotid bruit is not present. No thyromegaly present.   Cardiovascular: Normal rate, regular rhythm, S1 normal and S2 normal. PMI is not displaced. Exam reveals no S3, no S4, no distant heart sounds, no friction rub, no midsystolic click and no opening snap.   No murmur heard.  Pulses:       Radial pulses are 2+ on the right side, and 2+ on the left side.   Pulmonary/Chest: Effort normal and breath sounds normal. He has no wheezes. He has no rales.   Abdominal: Soft. Bowel sounds are normal. He exhibits no distension, no abdominal bruit and no mass. There is no tenderness.   Ostomy bag intact   Musculoskeletal: He exhibits no edema.   Neurological: He is alert and oriented to person, place, and time.   Skin: Skin is warm.   Psychiatric: He has a normal mood and affect. His behavior is normal.   Nursing note and vitals reviewed.      I have reviewed all pertinent labs and cardiac studies.        Chemistry        Component Value Date/Time     (L) " 01/04/2019 0446    K 5.3 (H) 01/04/2019 0446     01/04/2019 0446    CO2 20 (L) 01/04/2019 0446    BUN 21 01/04/2019 0446    CREATININE 2.0 (H) 01/04/2019 0446     (H) 01/04/2019 0446        Component Value Date/Time    CALCIUM 10.4 01/04/2019 0446    ALKPHOS 96 12/27/2018 1455    AST 30 12/27/2018 1455    ALT 14 12/27/2018 1455    BILITOT 0.9 12/27/2018 1455    ESTGFRAFRICA 39 (A) 01/04/2019 0446    EGFRNONAA 34 (A) 01/04/2019 0446        Lab Results   Component Value Date    WBC 9.45 12/27/2018    HGB 11.7 (L) 12/27/2018    HCT 36.4 (L) 12/27/2018    MCV 84 12/27/2018     12/27/2018     Lab Results   Component Value Date    HGBA1C 5.9 (H) 11/16/2018     Lab Results   Component Value Date    CHOL 91 (L) 11/16/2018    CHOL 105 (L) 05/02/2018    CHOL 105 (L) 05/02/2018     Lab Results   Component Value Date    HDL 21 (L) 11/16/2018    HDL 28 (L) 05/02/2018    HDL 28 (L) 05/02/2018     Lab Results   Component Value Date    LDLCALC 46.2 (L) 11/16/2018    LDLCALC 52.8 (L) 05/02/2018    LDLCALC 52.8 (L) 05/02/2018     Lab Results   Component Value Date    TRIG 119 11/16/2018    TRIG 121 05/02/2018    TRIG 121 05/02/2018     Lab Results   Component Value Date    CHOLHDL 23.1 11/16/2018    CHOLHDL 26.7 05/02/2018    CHOLHDL 26.7 05/02/2018           Assessment:       1. Biventricular ICD (implantable cardioverter-defibrillator) in place    2. CAD, multiple vessel    3. Chronic systolic heart failure    4. CKD (chronic kidney disease) stage 4, GFR 15-29 ml/min    5. Hypertension associated with diabetes    6. Hyperlipidemia associated with type 2 diabetes mellitus    7. Obesity (BMI 30.0-34.9)    8. LBBB (left bundle branch block)    9. Ischemic cardiomyopathy    10. Pulmonary HTN    11. Acute renal failure superimposed on stage 4 chronic kidney disease, unspecified acute renal failure type         Plan:             Compensated CHF.  S/p abdominal surgeries with issues involving acute on chronic renal  failure, dehydration, electrolyte imbalances.  Wife currently giving Bumex 2 mg qd.  She will alternate 1/2 pill and 1 pill on daily basis.   Hold off on restarting Coreg for now due to low BP issues.  Continue other meds.  Cardiac low salt diet  Medical tx for CAD.  F/u with Nephrology for CRI, next scheduled appt.  Needs close f/u in CHF clinic.  Schedule appt with Salvador Nascimento in few weeks.  F/u with me 3 months.

## 2019-01-07 NOTE — TELEPHONE ENCOUNTER
Returned call to patient to inform of prescription was sent to the pharmacy electronically,, no answer, left message to call back.

## 2019-01-07 NOTE — PATIENT INSTRUCTIONS
Discharge Instructions for Ileostomy  During an ileostomy, a surgeon removes the colon (large intestine) and part of the last section of the ileum (small intestine) if they are diseased. The surgeon may also disconnect parts of the intestine if they have been injured. Disconnection allows time for injured intestines to heal; then they are reconnected. In other cases, the ileostomy is permanent. During the ileostomy, the end of the ileum is brought through the abdominal wall. This makes an opening, called a stoma, for the contents of your intestines and mucus to pass out of the body. The following are general guidelines to follow after your ileostomy. Your healthcare provider and ostomy nurse will go over any information that is specific to your condition.  Activity  Tips for activities include the following:   · Dont lift anything heavier than 5 pounds until your healthcare provider says it is OK.  · Dont drive until after your first healthcare provider's appointment following your surgery.  · If you ride in a car for more than short trips, stop often to stretch your legs.  · Ask your healthcare provider about when you can expect to return to work. Most people are able to return to work within 4 to 6 weeks after surgery.  · Increase your activity gradually. Take short walks on a level surface.  · Dont overexert yourself to the point of fatigue. If you become tired, rest.  Other home care  Suggestions for home care:  · Take care of your stoma as directed. Your healthcare provider and ostomy nurse discussed how to do this with you before you left the hospital.  · Ask your healthcare provider or ostomy nurse for a patient education sheet about ileostomy care before you leave the hospital. This will help remind you how to care for yourself. A wound-ostomy-continence nurse will likely see you before and after surgery for questions and teaching. Let the nurse know if you want a significant other to be present for the  education on your ostomy care.   · Ask your healthcare provider to prescribe medicines to reduce the output from your ostomy if necessary.  · Dont be alarmed by bowel movements that contain mucus. This is common following this procedure. Increased gassiness is also common.   · Shower or bathe as instructed by your healthcare provider.  · Wash the incision site with soap and water and pat dry.  · Check your incision every day for redness, drainage, swelling, or separation of the skin.  · Dont take any over-the-counter medicine unless your healthcare provider tells you to do so.     When to call your healthcare provider  Call your healthcare provider right away if you have any of the following:  · Excessive bleeding from your stoma  · A change in your stoma's color or a stoma that looks like it's getting longer   · Bulging skin around your stoma  · Blood in your stool  · Fever above 100.4°F (38.0°C) or higher, or as directed by your healthcare provider  · Redness, swelling, bleeding, or drainage from your incision  · Constipation or diarrhea  · Nausea or vomiting  · Increased pain in the belly or around the stoma   Date Last Reviewed: 7/1/2016  © 8834-8959 [employer]. [address] [place] [address], [place] [address]. All rights reserved. This information is not intended as a substitute for professional medical care. Always follow your healthcare professional's instructions.

## 2019-01-08 ENCOUNTER — HOSPITAL ENCOUNTER (OUTPATIENT)
Dept: RADIOLOGY | Facility: HOSPITAL | Age: 64
Discharge: HOME | End: 2019-01-08
Attending: SURGERY
Payer: MEDICARE

## 2019-01-08 DIAGNOSIS — K91.89 ANASTOMOTIC LEAK OF INTESTINE: ICD-10-CM

## 2019-01-08 PROCEDURE — 25500020 PHARM REV CODE 255: Performed by: SURGERY

## 2019-01-08 PROCEDURE — 74270 X-RAY XM COLON 1CNTRST STD: CPT | Mod: TC

## 2019-01-08 RX ADMIN — DIATRIZOATE MEGLUMINE AND DIATRIZOATE SODIUM 720 ML: 660; 100 LIQUID ORAL; RECTAL at 11:01

## 2019-01-09 ENCOUNTER — LAB VISIT (OUTPATIENT)
Dept: LAB | Facility: HOSPITAL | Age: 64
End: 2019-01-09
Attending: SURGERY
Payer: MEDICARE

## 2019-01-09 DIAGNOSIS — E11.22 DIABETES MELLITUS WITH CHRONIC KIDNEY DISEASE: ICD-10-CM

## 2019-01-09 DIAGNOSIS — N18.4 CHRONIC KIDNEY DISEASE, STAGE IV (SEVERE): Primary | ICD-10-CM

## 2019-01-09 LAB
ANION GAP SERPL CALC-SCNC: 10 MMOL/L
BUN SERPL-MCNC: 44 MG/DL
CALCIUM SERPL-MCNC: 9.9 MG/DL
CHLORIDE SERPL-SCNC: 98 MMOL/L
CO2 SERPL-SCNC: 21 MMOL/L
CREAT SERPL-MCNC: 3 MG/DL
EST. GFR  (AFRICAN AMERICAN): 24 ML/MIN/1.73 M^2
EST. GFR  (NON AFRICAN AMERICAN): 21 ML/MIN/1.73 M^2
GLUCOSE SERPL-MCNC: 140 MG/DL
POTASSIUM SERPL-SCNC: 5.9 MMOL/L
SODIUM SERPL-SCNC: 129 MMOL/L

## 2019-01-09 PROCEDURE — 80048 BASIC METABOLIC PNL TOTAL CA: CPT

## 2019-01-11 ENCOUNTER — HOSPITAL ENCOUNTER (EMERGENCY)
Facility: HOSPITAL | Age: 64
Discharge: HOME | End: 2019-01-11
Attending: EMERGENCY MEDICINE
Payer: MEDICARE

## 2019-01-11 ENCOUNTER — TELEPHONE (OUTPATIENT)
Dept: SURGERY | Facility: CLINIC | Age: 64
End: 2019-01-11

## 2019-01-11 VITALS
SYSTOLIC BLOOD PRESSURE: 110 MMHG | HEIGHT: 68 IN | RESPIRATION RATE: 14 BRPM | DIASTOLIC BLOOD PRESSURE: 53 MMHG | OXYGEN SATURATION: 98 % | TEMPERATURE: 98 F | HEART RATE: 99 BPM | BODY MASS INDEX: 26.11 KG/M2

## 2019-01-11 DIAGNOSIS — Z98.890 S/P COLOSTOMY TAKEDOWN: Primary | ICD-10-CM

## 2019-01-11 DIAGNOSIS — T85.698A JP DRAIN, BROKEN, INITIAL ENCOUNTER: ICD-10-CM

## 2019-01-11 PROCEDURE — 99281 EMR DPT VST MAYX REQ PHY/QHP: CPT

## 2019-01-11 PROCEDURE — 99282 EMERGENCY DEPT VISIT SF MDM: CPT

## 2019-01-11 NOTE — ED NOTES
Pt presents to ER with complaint that LLQ KINDRA drain has fallen out. Pt was told to report to ED if drain fell out.    Patient identifiers verified and correct for Yan Choudhury.    LOC: The patient is awake, alert and aware of environment with an appropriate affect, the patient is oriented x 3 and speaking appropriately.  APPEARANCE: Patient resting comfortably and in no acute distress, patient is clean and well groomed, patient's clothing is properly fastened.  SKIN: The skin is warm and dry, color consistent with ethnicity, patient has normal skin turgor and moist mucus membranes, skin intact, no breakdown or bruising noted. Pt has RLQ colostomy & small site to LLQ where KINDRA drain was place. No bleeding or drainage of fluid noted.  MUSCULOSKELETAL: Patient moving all extremities spontaneously.  RESPIRATORY: Airway is open and patent, respirations are spontaneous.  CARDIAC: Patient has a normal rate, no periphreal edema noted, capillary refill < 3 seconds.  ABDOMEN: Soft and non tender to palpation.

## 2019-01-11 NOTE — ED PROVIDER NOTES
SCRIBE #1 NOTE: I, Reyna Douglas, am scribing for, and in the presence of, Joshua Grissom MD. I have scribed the entire note.      History      Chief Complaint   Patient presents with    Wound Check     KINDRA drain fell out. had a reversal of colostomy and told to come to ED if drain fell  out       Review of patient's allergies indicates:  No Known Allergies     HPI   HPI    1/11/2019, 9:50 AM   History obtained from the patient      History of Present Illness: Yan Choudhury is a 63 y.o. male patient s/p colostomy reversal, R hemicolectomy, splenic flexure mobilization, and loop ileostomy on 10/30/18 by Dr. Lindsay who presents to the Emergency Department for an evaluation after pt's 13 [place] citizen KINDRA drain fell out a couple hours ago. Pt is not c/o any pain at this time. Patient denies any fever, chills, abd pain, diaphoresis, CP, color change, SOB, HA, dizziness, extremity weakness/numbness, nausea, vomiting, and all other sxs at this time. No further complaints or concerns at this time.         Arrival mode: Personal vehicle    PCP: Sandra Estevez MD       Past Medical History:  Past Medical History:   Diagnosis Date    Arthritis     CAD (coronary artery disease)     Cataract     CHF (congestive heart failure)     Dr Calvillo    Chronic combined systolic and diastolic congestive heart failure 3/24/2015    CKD (chronic kidney disease), stage III     Diabetes mellitus type II      AM    Diabetic retinopathy     anti Veg F injections for macular edema    Diverticulitis of colon     ED (erectile dysfunction)     Glaucoma     HTN (hypertension)     Hyperlipidemia     Ischemic cardiomyopathy     Obesity     JAHAIRA on CPAP     Pacemaker     Pulmonary HTN        Past Surgical History:  Past Surgical History:   Procedure Laterality Date    CARDIAC CATHETERIZATION  2009    CHOLECYSTECTOMY      COLECTOMY-SIGMOID N/A 4/22/2016    [facility]    COLONOSCOPY N/A 10/11/2018     [facility] ENDO    COLOSTOMY N/A 4/22/2016    [facility]    CREATION, ILEOSTOMY N/A 10/30/2018    [facility] OR    EYE SURGERY      HEMICOLECTOMY, RIGHT Right 10/30/2018    [facility] OR    KNEE SURGERY      MOBILIZATION, SPLENIC FLEXURE N/A 10/30/2018    [facility] OR    REVISION OR CLOSURE, COLOSTOMY N/A 10/30/2018    [facility] OR         Family History:  Family History   Problem Relation Age of Onset    Cancer Mother     Heart disease Father     Stroke Father     No Known Problems Sister     No Known Problems Brother     No Known Problems Maternal Aunt     No Known Problems Maternal Uncle     No Known Problems Paternal Aunt     No Known Problems Paternal Uncle     No Known Problems Maternal Grandmother     No Known Problems Maternal Grandfather     No Known Problems Paternal Grandmother     No Known Problems Paternal Grandfather     Amblyopia Neg Hx     Blindness Neg Hx     Cataracts Neg Hx     Diabetes Neg Hx     Glaucoma Neg Hx     Hypertension Neg Hx     Macular degeneration Neg Hx     Retinal detachment Neg Hx     Strabismus Neg Hx     Thyroid disease Neg Hx        Social History:  Social History     Tobacco Use    Smoking status: Never Smoker    Smokeless tobacco: Never Used   Substance and Sexual Activity    Alcohol use: No     Alcohol/week: 0.0 oz     Frequency: Never    Drug use: No    Sexual activity: Not Currently       ROS   Review of Systems   Constitutional: Negative for chills, diaphoresis and fever.   HENT: Negative for sore throat.    Respiratory: Negative for shortness of breath.    Cardiovascular: Negative for chest pain.   Gastrointestinal: Negative for abdominal pain, nausea and vomiting.        +KINDRA drain malfunction   Genitourinary: Negative for dysuria and hematuria.  "  Musculoskeletal: Negative for back pain.   Skin: Negative for color change and rash.   Neurological: Negative for dizziness, weakness, numbness and headaches.   Hematological: Does not bruise/bleed easily.   All other systems reviewed and are negative.      Physical Exam      Initial Vitals [01/11/19 0947]   BP Pulse Resp Temp SpO2   (!) 102/51 105 18 97.8 °F (36.6 °C) 98 %      MAP       --          Physical Exam  Nursing Notes and Vital Signs Reviewed.  Constitutional: Patient is in no acute distress. Well-developed and well-nourished.  Head: Atraumatic. Normocephalic.  Eyes: PERRL. EOM intact. Conjunctivae are not pale. No scleral icterus.  ENT: Mucous membranes are moist. Oropharynx is clear and symmetric.    Neck: Supple. Full ROM. No lymphadenopathy.  Cardiovascular: Regular rate. Regular rhythm. No murmurs, rubs, or gallops. Distal pulses are 2+ and symmetric.  Pulmonary/Chest: No respiratory distress. Clear to auscultation bilaterally. No wheezing or rales.  Abdominal: Soft and non-distended.  There is no tenderness.  No rebound, guarding, or rigidity.   Ileostomy to RUQ. Surgical defect to LLQ where the drain was, no TTP.   Musculoskeletal: Moves all extremities. No obvious deformities. No edema.   Skin: Warm and dry.  Neurological:  Alert, awake, and appropriate.  Normal speech.  No acute focal neurological deficits are appreciated.  Psychiatric: Normal affect. Good eye contact. Appropriate in content.    ED Course    Procedures  ED Vital Signs:  Vitals:    01/11/19 0947   BP: (!) 102/51   Pulse: 105   Resp: 18   Temp: 97.8 °F (36.6 °C)   TempSrc: Oral   SpO2: 98%   Height: 5' 8" (1.727 m)       Abnormal Lab Results:  Labs Reviewed - No data to display     All Lab Results:      Imaging Results:  Imaging Results    None                 The Emergency Provider reviewed the vital signs and test results, which are outlined above.    ED Discussion     10:08 AM: Dr. Grissom discussed the pt's case with  " Mahesh (General Surgery) who states that there is no need to have the drained re-placed and can be discharged. Pt has an appointment to see Dr. Lindsay on Monday.    10:09 AM: Reassessed pt at this time.  Pt is awake, alert, and in NAD at this time. Discussed with pt all pertinent ED information and results. Discussed pt dx and plan of tx. Gave pt all f/u and return to the ED instructions. All questions and concerns were addressed at this time. Pt expresses understanding of information and instructions, and is comfortable with plan to discharge. Pt is stable for discharge.    I discussed with patient and/or family/caretaker that evaluation in the ED does not suggest any emergent or life threatening medical conditions requiring immediate intervention beyond what was provided in the ED, and I believe patient is safe for discharge.  Regardless, an unremarkable evaluation in the ED does not preclude the development or presence of a serious of life threatening condition. As such, patient was instructed to return immediately for any worsening or change in current symptoms.    Current Discharge Medication List      CONTINUE these medications which have NOT CHANGED    Details   allopurinol (ZYLOPRIM) 100 MG tablet TAKE 1 TABLET (100 MG TOTAL) BY MOUTH ONCE DAILY.  Qty: 90 tablet, Refills: 0      aspirin (ECOTRIN) 81 MG EC tablet Take 81 mg by mouth every morning.       BD INSULIN SYRINGE ULTRA-FINE 0.5 mL 31 gauge x 5/16 Syrg USE AS DIRECTED TO INJECT INSULIN TWO TIMES DAILY  Qty: 60 each, Refills: 11      bimatoprost (LUMIGAN) 0.03 % ophthalmic drops Place 1 drop into the left eye every evening.  Qty: 2.5 mL, Refills: 11    Associated Diagnoses: Primary open angle glaucoma (POAG) of left eye, moderate stage      blood sugar diagnostic Strp 1 strip by Misc.(Non-Drug; Combo Route) route 4 (four) times daily. [facility]care  Qty: 120 strip, Refills: 11    Associated Diagnoses: Type 2 diabetes mellitus with stage 3 chronic kidney  "disease, with long-term current use of insulin      bumetanide (BUMEX) 2 MG tablet Take 1 tablet (2 mg total) by mouth 2 (two) times daily.  Qty: 75 tablet, Refills: 11    Associated Diagnoses: Acute on chronic congestive heart failure, unspecified congestive heart failure type      carvedilol (COREG) 25 MG tablet TAKE 1/2 TABLET BY MOUTH TWICE A DAY WITH MEALS  Qty: 30 tablet, Refills: 9      cholecalciferol, vitamin D3, (VITAMIN D3) 1,000 unit capsule Take 1,000 Units by mouth once daily.      HYDROcodone-acetaminophen (NORCO) 5-325 mg per tablet One or 2 every 4-6 hours as needed for pain  Qty: 20 tablet, Refills: 0      insulin glargine (LANTUS) 100 unit/mL injection 50 units bid prn when BS< 150  Qty: 30 mL, Refills: 11    Associated Diagnoses: Type 2 diabetes mellitus with stage 3 chronic kidney disease, with long-term current use of insulin      IRON/VITAMIN B COMPLEX (GERITOL ORAL) Take by mouth.      lancets [facility] For tel care  Qty: 120 each, Refills: 11    Associated Diagnoses: Type 2 diabetes mellitus with stage 3 chronic kidney disease, with long-term current use of insulin      lisinopril (PRINIVIL,ZESTRIL) 5 MG tablet TAKE 1 TABLET (5 MG TOTAL) BY MOUTH ONCE DAILY.  Qty: 90 tablet, Refills: 3    Associated Diagnoses: Essential hypertension      pantoprazole (PROTONIX) 40 MG tablet TAKE 1 TABLET BY MOUTH EVERY DAY FOR ACID REFLUX  Qty: 90 tablet, Refills: 1      pen needle, diabetic (BD ULTRA-FINE JOSLYN PEN NEEDLE) 32 gauge x 5/32" Ndle 1 each by Misc.(Non-Drug; Combo Route) route 4 (four) times daily.  Qty: 100 each, Refills: 11    Associated Diagnoses: Type 2 diabetes mellitus with stage 3 chronic kidney disease, with long-term current use of insulin      simvastatin (ZOCOR) 10 MG tablet TAKE 1 TABLET (10 MG TOTAL) BY MOUTH EVERY EVENING.  Qty: 30 tablet, Refills: 7      sodium bicarbonate 650 MG tablet Take 3 tablets (1,950 mg total) by mouth 3 (three) times daily.  Qty: 270 tablet, Refills: 11       "       ED Medication(s):  Medications - No data to display    Follow-up Information     Kevin Lindsay MD In 3 days.    Specialty:  General Surgery  Why:  Return to ED if fever, abdominal pain develops, or for any concerns  Contact information:  [address]  [place] [address] [phone]                     Medical Decision Making              Scribe Attestation:   Scribe #1: I performed the above scribed service and the documentation accurately describes the services I performed. I attest to the accuracy of the note.    Attending:   Physician Attestation Statement for Scribe #1: I, Joshua Grissom MD, personally performed the services described in this documentation, as scribed by Reyna Douglas, in my presence, and it is both accurate and complete.          Clinical Impression       ICD-10-CM ICD-9-CM   1. S/P colostomy takedown Z98.890 V45.89   2. KINDRA drain, broken, initial encounter T85.698A 996.59       Disposition:   Disposition: Discharged  Condition: Stable         Joshua Grissom MD  01/11/19 1014

## 2019-01-14 ENCOUNTER — OFFICE VISIT (OUTPATIENT)
Dept: SURGERY | Facility: CLINIC | Age: 64
DRG: 683 | End: 2019-01-14
Payer: MEDICARE

## 2019-01-14 ENCOUNTER — TELEPHONE (OUTPATIENT)
Dept: SURGERY | Facility: CLINIC | Age: 64
End: 2019-01-14

## 2019-01-14 ENCOUNTER — TELEPHONE (OUTPATIENT)
Dept: ADMINISTRATIVE | Facility: CLINIC | Age: 64
End: 2019-01-14

## 2019-01-14 VITALS
BODY MASS INDEX: 26.11 KG/M2 | TEMPERATURE: 99 F | DIASTOLIC BLOOD PRESSURE: 61 MMHG | HEART RATE: 96 BPM | WEIGHT: 171.75 LBS | SYSTOLIC BLOOD PRESSURE: 85 MMHG

## 2019-01-14 DIAGNOSIS — N17.9 ACUTE RENAL FAILURE, UNSPECIFIED ACUTE RENAL FAILURE TYPE: Primary | ICD-10-CM

## 2019-01-14 PROCEDURE — 99213 OFFICE O/P EST LOW 20 MIN: CPT | Mod: PBBFAC | Performed by: SURGERY

## 2019-01-14 PROCEDURE — 99024 POSTOP FOLLOW-UP VISIT: CPT | Mod: POP,,, | Performed by: SURGERY

## 2019-01-14 PROCEDURE — 99024 PR POST-OP FOLLOW-UP VISIT: ICD-10-PCS | Mod: POP,,, | Performed by: SURGERY

## 2019-01-14 PROCEDURE — 99999 PR PBB SHADOW E&M-EST. PATIENT-LVL III: ICD-10-PCS | Mod: PBBFAC,,, | Performed by: SURGERY

## 2019-01-14 PROCEDURE — 99999 PR PBB SHADOW E&M-EST. PATIENT-LVL III: CPT | Mod: PBBFAC,,, | Performed by: SURGERY

## 2019-01-14 NOTE — PROGRESS NOTES
Subjective:       Patient ID: Yan Choudhury is a 63 y.o. male.    Ileostomy  Chief Complaint: Post-op Evaluation    Patient has a protective loop ileostomy.  He has undergone a Gastrografin enema that showed no evidence of a leak from his colorectal anastomosis after a colostomy reversal.  His drain fell out.  He reports no fever chills.    He has had anywhere between 500-700 mL of ileostomy output a day, there was 1 day when he had 1400 mL out.    He has some skin irritation below the colostomy      Review of Systems   Constitutional: Positive for fever.   HENT: Negative.    Eyes: Negative.    Respiratory: Negative.    Cardiovascular: Negative.    Gastrointestinal: Negative.         Ileostomy with liquid output, skin irritation below it   Endocrine: Negative.    Genitourinary: Negative.    Musculoskeletal: Negative.        Objective:      Physical Exam   Constitutional:   Chronically ill   HENT:   Head: Normocephalic and atraumatic.   Neck: Neck supple.   Cardiovascular: Normal rate and regular rhythm.   Pulmonary/Chest: Effort normal and breath sounds normal.   Abdominal: Soft. Bowel sounds are normal.   Right upper quadrant ileostomy with liquid output.  The mild skin irritation below it.  Well-healed midline incision.  Old colostomy site is healed    The drain is not in place   Skin: Skin is warm and dry. Capillary refill takes less than 2 seconds.   Psychiatric: He has a normal mood and affect. His behavior is normal. Judgment and thought content normal.   Vitals reviewed.    Gastrografin enema showed no leak   Assessment:      chronic hyponatremia with no mental status   Plan:      CMP in renal function panel today.  If his sodium, potassium or BUN creatinine 0 worsening he need to be admitted to the hospital.  We will need to further discuss ileostomy reversal which hopefully can be done through a small incision in the area of his loop ileostomy.  This was discussed with him and his wife.    We will review the  labs tomorrow and appropriate action will be taken

## 2019-01-14 NOTE — TELEPHONE ENCOUNTER
Home Health Recert with [facility]-Dr. Sandra Estevez. Pt received SN and PT services.

## 2019-01-14 NOTE — PHYSICIAN QUERY
PT Name: Yan Choudhury  MR #: [id number]    Physician Query Form - CardioPulmonary Clarification      CDS/: Donita Gonzalez               Contact information:[email or web address]     This form is a permanent document in the medical record.    Query Date: January 14, 2019    By submitting this query, we are merely seeking further clarification of documentation. Please utilize your independent clinical judgment when addressing the question(s) below.    The Medical record contains the following:   Indicators   Supporting Clinical Findings Location in Medical Record   x Pulmonary Hypertension documented Pulmonary HTN    Monitor     General surgery note 12-29   x Acute/Chronic Illness Severe combined diastolic and  systolic CHF (EF 30%),    Nephrology pn 1-1    Echo and/or Heart Cath Findings      BiPAP/Intubation/Supplemental O2      SOB, LIM, Fatigue, Dizziness, LE Edema, Cyanosis, Chest Pain, Respiratory Distress, Hypoxia, etc.     x Treatment         Medication Lisinopril and Bumetanide p.o. Mar 12-28 to 12-31    Other     Provider, please specify the type of pulmonary hypertension:  [   ] Group 2:  Pulmonary Hypertension due to Left Heart Disease, including left heart failure and/or left heart valve disease     [ x  ] Pulmonary Hypertension, unspecified     [   ] Other Cardiopulmonary Condition (please specify):     [  ] Clinically Undetermined         Please document in your progress notes daily for the duration of treatment, until resolved, and include in your discharge summary.

## 2019-01-14 NOTE — PHYSICIAN QUERY
PT Name: Yan Choudhury  MR #: [id number]  Physician Query Form - Renal Condition Clarification     CDS/: Donita Gonzalez               Contact information:[email or web address]     This form is a permanent document in the medical record.     QueryDate: January 14, 2019    By submitting this query, we are merely seeking further clarification of documentation. Please utilize your independent clinical judgment when addressing the question(s) below.    The Medical record contains the following:   Indicator Supporting Clinical Findings Location in Medical Record    Kidney (Renal) Insufficiency      Kidney (Renal) Failure / Injury      Nephrotoxic Agents     x BUN/Creatinine GFR Bun=16 to 21  Creatinine=1.4 to 2.0  GFR=39 to >60 Lab 12-29 to 1-4    Urine: Casts         Eosinophils      Dehydration      Nausea/Vomiting      Dialysis/CRRT     x Treatment: IVF  Mar 12-28 to 12-31    Other:      Acute Kidney Injury / Acute Renal Failure has different defining criteria. A generally accepted guideline  is:   A greater than 100% (2X) rise in serum creatinine from baseline* occurring during the course of a single hospital stay.   *Baseline as determined by the providers judgment and consideration of previous lab values and other documentation, if available.    A diagnosis of Acute Kidney Injury/ Acute Renal Failure should incorporate abnormal labs and clinical findings that are clinically significant      References: 1. Louiseomo et al. Acute renal failure-definition, outcome measures, animal models, fluid therapy and information technology needs: the Second International Consensus Conference of the Acute Dialysis Quality Initiative (ADQI) Group. Crit Care 2004; 8:B204; 2. Pippa et al. Acute Kidney Injury Network: report of an initiative to improve outcomes in acute kidney injury. Crit Care 2007; 11:R31; 3. Kidney Disease: Improving Global Outcomes (KDIGO). Acute Kidney Injury Work Group. KDIGO clinical practice  guidelines for acute kidney injury. Kidney Int Suppl 2012; 2:1.    The clinical guidelines noted below is only a system guideline, it does not replace the providers clinical judgment.    Provider, please specify the diagnosis or diagnoses associated with above clinical findings.    [  x ] Other Acute Kidney Failure/Injury (please specify): __ secondary to ileostomy output __________     [   ] Unspecified Acute Kidney Failure/Injury      [   ] Other (please specify): _________________________________   [   ]  Clinically Undetermined       Please document in your progress notes daily for the duration of treatment until resolved and include in your discharge summary.

## 2019-01-14 NOTE — TELEPHONE ENCOUNTER
----- Message from Halley Henriquez sent at 1/14/2019  9:17 AM CST -----  Contact: [facility] - [facility]  Request a call concerning the pt lab appt, no additional info given, can be reached at [phone]///[email or web address]

## 2019-01-14 NOTE — PATIENT INSTRUCTIONS
We will check some labs today and I will let you know with the show tomorrow.    We may have to readmit you to the hospital and give you IV fluids.    In all likelihood you will need your ileostomy closed to prevent these fluid abnormalities that are causing your sodium to be low, your potassium to be high in your kidney function to be worsened.    You can use zinc oxide or patrick'x Butt Paste on the irritated skin around your ileostomy.  For the area that itches you can try hydrocortisone ointment but if that makes it worse let us know.

## 2019-01-15 ENCOUNTER — HOSPITAL ENCOUNTER (INPATIENT)
Facility: HOSPITAL | Age: 64
LOS: 3 days | Discharge: HOME HEALTH | DRG: 683 | End: 2019-01-18
Attending: SURGERY | Admitting: SURGERY
Payer: MEDICARE

## 2019-01-15 ENCOUNTER — TELEPHONE (OUTPATIENT)
Dept: SURGERY | Facility: HOSPITAL | Age: 64
End: 2019-01-15

## 2019-01-15 DIAGNOSIS — N18.4 CKD (CHRONIC KIDNEY DISEASE) STAGE 4, GFR 15-29 ML/MIN: ICD-10-CM

## 2019-01-15 DIAGNOSIS — E11.59 HYPERTENSION ASSOCIATED WITH DIABETES: ICD-10-CM

## 2019-01-15 DIAGNOSIS — N17.0 ACUTE RENAL FAILURE WITH ACUTE TUBULAR NECROSIS SUPERIMPOSED ON STAGE 4 CHRONIC KIDNEY DISEASE: ICD-10-CM

## 2019-01-15 DIAGNOSIS — E87.20 ACIDOSIS, METABOLIC: ICD-10-CM

## 2019-01-15 DIAGNOSIS — Z90.49 S/P PARTIAL COLECTOMY: ICD-10-CM

## 2019-01-15 DIAGNOSIS — N18.4 ACUTE RENAL FAILURE WITH ACUTE TUBULAR NECROSIS SUPERIMPOSED ON STAGE 4 CHRONIC KIDNEY DISEASE: ICD-10-CM

## 2019-01-15 DIAGNOSIS — I25.5 ISCHEMIC CARDIOMYOPATHY: ICD-10-CM

## 2019-01-15 DIAGNOSIS — E87.1 HYPONATREMIA: ICD-10-CM

## 2019-01-15 DIAGNOSIS — Z93.2 ILEOSTOMY IN PLACE: ICD-10-CM

## 2019-01-15 DIAGNOSIS — N17.9 ACUTE RENAL FAILURE, UNSPECIFIED ACUTE RENAL FAILURE TYPE: Primary | ICD-10-CM

## 2019-01-15 DIAGNOSIS — I15.2 HYPERTENSION ASSOCIATED WITH DIABETES: ICD-10-CM

## 2019-01-15 PROBLEM — I50.42 CHRONIC COMBINED SYSTOLIC AND DIASTOLIC CONGESTIVE HEART FAILURE: Status: ACTIVE | Noted: 2019-01-15

## 2019-01-15 PROBLEM — K43.5 PARASTOMAL HERNIA WITHOUT OBSTRUCTION OR GANGRENE: Status: RESOLVED | Noted: 2018-09-11 | Resolved: 2019-01-15

## 2019-01-15 LAB
ANION GAP SERPL CALC-SCNC: 12 MMOL/L
ANION GAP SERPL CALC-SCNC: 9 MMOL/L
ANION GAP SERPL CALC-SCNC: 9 MMOL/L
BUN SERPL-MCNC: 53 MG/DL
BUN SERPL-MCNC: 56 MG/DL
BUN SERPL-MCNC: 56 MG/DL
CALCIUM SERPL-MCNC: 10.2 MG/DL
CALCIUM SERPL-MCNC: 10.2 MG/DL
CALCIUM SERPL-MCNC: 9.9 MG/DL
CHLORIDE SERPL-SCNC: 93 MMOL/L
CHLORIDE SERPL-SCNC: 93 MMOL/L
CHLORIDE SERPL-SCNC: 95 MMOL/L
CO2 SERPL-SCNC: 20 MMOL/L
CO2 SERPL-SCNC: 24 MMOL/L
CO2 SERPL-SCNC: 24 MMOL/L
CREAT SERPL-MCNC: 2.9 MG/DL
CREAT SERPL-MCNC: 3.1 MG/DL
CREAT SERPL-MCNC: 3.1 MG/DL
EST. GFR  (AFRICAN AMERICAN): 23 ML/MIN/1.73 M^2
EST. GFR  (AFRICAN AMERICAN): 23 ML/MIN/1.73 M^2
EST. GFR  (AFRICAN AMERICAN): 25 ML/MIN/1.73 M^2
EST. GFR  (NON AFRICAN AMERICAN): 20 ML/MIN/1.73 M^2
EST. GFR  (NON AFRICAN AMERICAN): 20 ML/MIN/1.73 M^2
EST. GFR  (NON AFRICAN AMERICAN): 22 ML/MIN/1.73 M^2
GLUCOSE SERPL-MCNC: 124 MG/DL
GLUCOSE SERPL-MCNC: 124 MG/DL
GLUCOSE SERPL-MCNC: 137 MG/DL
POCT GLUCOSE: 151 MG/DL (ref 70–110)
POCT GLUCOSE: 153 MG/DL (ref 70–110)
POTASSIUM SERPL-SCNC: 5.1 MMOL/L
POTASSIUM SERPL-SCNC: 5.5 MMOL/L
POTASSIUM SERPL-SCNC: 5.5 MMOL/L
SODIUM SERPL-SCNC: 126 MMOL/L
SODIUM SERPL-SCNC: 126 MMOL/L
SODIUM SERPL-SCNC: 127 MMOL/L

## 2019-01-15 PROCEDURE — 11000001 HC ACUTE MED/SURG PRIVATE ROOM

## 2019-01-15 PROCEDURE — 80048 BASIC METABOLIC PNL TOTAL CA: CPT | Mod: 91

## 2019-01-15 PROCEDURE — 99223 PR INITIAL HOSPITAL CARE,LEVL III: ICD-10-PCS | Mod: ,,, | Performed by: INTERNAL MEDICINE

## 2019-01-15 PROCEDURE — 99223 1ST HOSP IP/OBS HIGH 75: CPT | Mod: ,,, | Performed by: INTERNAL MEDICINE

## 2019-01-15 PROCEDURE — 36569 INSJ PICC 5 YR+ W/O IMAGING: CPT

## 2019-01-15 PROCEDURE — 36415 COLL VENOUS BLD VENIPUNCTURE: CPT

## 2019-01-15 PROCEDURE — 63600175 PHARM REV CODE 636 W HCPCS: Performed by: SURGERY

## 2019-01-15 PROCEDURE — 25000003 PHARM REV CODE 250: Performed by: INTERNAL MEDICINE

## 2019-01-15 PROCEDURE — C1751 CATH, INF, PER/CENT/MIDLINE: HCPCS

## 2019-01-15 PROCEDURE — 25000003 PHARM REV CODE 250: Performed by: SURGERY

## 2019-01-15 RX ORDER — INSULIN ASPART 100 [IU]/ML
1-10 INJECTION, SOLUTION INTRAVENOUS; SUBCUTANEOUS
Status: DISCONTINUED | OUTPATIENT
Start: 2019-01-15 | End: 2019-01-15

## 2019-01-15 RX ORDER — GLUCAGON 1 MG
1 KIT INJECTION
Status: DISCONTINUED | OUTPATIENT
Start: 2019-01-15 | End: 2019-01-15

## 2019-01-15 RX ORDER — INSULIN ASPART 100 [IU]/ML
1-10 INJECTION, SOLUTION INTRAVENOUS; SUBCUTANEOUS
Status: CANCELLED | OUTPATIENT
Start: 2019-01-15

## 2019-01-15 RX ORDER — SODIUM CHLORIDE 0.9 % (FLUSH) 0.9 %
10 SYRINGE (ML) INJECTION EVERY 6 HOURS
Status: DISCONTINUED | OUTPATIENT
Start: 2019-01-16 | End: 2019-01-18 | Stop reason: HOSPADM

## 2019-01-15 RX ORDER — INSULIN ASPART 100 [IU]/ML
1-10 INJECTION, SOLUTION INTRAVENOUS; SUBCUTANEOUS
Status: DISCONTINUED | OUTPATIENT
Start: 2019-01-15 | End: 2019-01-18 | Stop reason: HOSPADM

## 2019-01-15 RX ORDER — ALLOPURINOL 100 MG/1
100 TABLET ORAL DAILY
Status: DISCONTINUED | OUTPATIENT
Start: 2019-01-15 | End: 2019-01-15

## 2019-01-15 RX ORDER — ONDANSETRON 8 MG/1
8 TABLET, ORALLY DISINTEGRATING ORAL EVERY 8 HOURS PRN
Status: DISCONTINUED | OUTPATIENT
Start: 2019-01-15 | End: 2019-01-18 | Stop reason: HOSPADM

## 2019-01-15 RX ORDER — RAMELTEON 8 MG/1
8 TABLET ORAL NIGHTLY PRN
Status: CANCELLED | OUTPATIENT
Start: 2019-01-15

## 2019-01-15 RX ORDER — HYDROCODONE BITARTRATE AND ACETAMINOPHEN 5; 325 MG/1; MG/1
1 TABLET ORAL EVERY 6 HOURS PRN
Status: DISCONTINUED | OUTPATIENT
Start: 2019-01-15 | End: 2019-01-18 | Stop reason: HOSPADM

## 2019-01-15 RX ORDER — GLUCAGON 1 MG
1 KIT INJECTION
Status: DISCONTINUED | OUTPATIENT
Start: 2019-01-15 | End: 2019-01-18 | Stop reason: HOSPADM

## 2019-01-15 RX ORDER — BUMETANIDE 2 MG/1
2 TABLET ORAL EVERY MORNING
Status: DISCONTINUED | OUTPATIENT
Start: 2019-01-15 | End: 2019-01-15

## 2019-01-15 RX ORDER — LISINOPRIL 5 MG/1
5 TABLET ORAL EVERY MORNING
Status: DISCONTINUED | OUTPATIENT
Start: 2019-01-15 | End: 2019-01-15

## 2019-01-15 RX ORDER — ACETAMINOPHEN 325 MG/1
650 TABLET ORAL EVERY 8 HOURS PRN
Status: CANCELLED | OUTPATIENT
Start: 2019-01-15

## 2019-01-15 RX ORDER — IBUPROFEN 200 MG
16 TABLET ORAL
Status: DISCONTINUED | OUTPATIENT
Start: 2019-01-15 | End: 2019-01-18 | Stop reason: HOSPADM

## 2019-01-15 RX ORDER — LIDOCAINE HYDROCHLORIDE 10 MG/ML
1 INJECTION, SOLUTION EPIDURAL; INFILTRATION; INTRACAUDAL; PERINEURAL ONCE
Status: DISCONTINUED | OUTPATIENT
Start: 2019-01-15 | End: 2019-01-18 | Stop reason: HOSPADM

## 2019-01-15 RX ORDER — IBUPROFEN 200 MG
16 TABLET ORAL
Status: CANCELLED | OUTPATIENT
Start: 2019-01-15

## 2019-01-15 RX ORDER — IBUPROFEN 200 MG
16 TABLET ORAL
Status: DISCONTINUED | OUTPATIENT
Start: 2019-01-15 | End: 2019-01-15

## 2019-01-15 RX ORDER — SODIUM BICARBONATE 650 MG/1
650 TABLET ORAL 3 TIMES DAILY
Status: DISCONTINUED | OUTPATIENT
Start: 2019-01-15 | End: 2019-01-18 | Stop reason: HOSPADM

## 2019-01-15 RX ORDER — ACETAMINOPHEN 325 MG/1
650 TABLET ORAL EVERY 8 HOURS PRN
Status: DISCONTINUED | OUTPATIENT
Start: 2019-01-15 | End: 2019-01-18 | Stop reason: HOSPADM

## 2019-01-15 RX ORDER — IBUPROFEN 200 MG
24 TABLET ORAL
Status: DISCONTINUED | OUTPATIENT
Start: 2019-01-15 | End: 2019-01-18 | Stop reason: HOSPADM

## 2019-01-15 RX ORDER — IBUPROFEN 200 MG
24 TABLET ORAL
Status: DISCONTINUED | OUTPATIENT
Start: 2019-01-15 | End: 2019-01-15

## 2019-01-15 RX ORDER — GLUCAGON 1 MG
1 KIT INJECTION
Status: CANCELLED | OUTPATIENT
Start: 2019-01-15

## 2019-01-15 RX ORDER — DIPHENHYDRAMINE HYDROCHLORIDE 50 MG/ML
25 INJECTION INTRAMUSCULAR; INTRAVENOUS EVERY 4 HOURS PRN
Status: DISCONTINUED | OUTPATIENT
Start: 2019-01-15 | End: 2019-01-15 | Stop reason: RX

## 2019-01-15 RX ORDER — SODIUM BICARBONATE 650 MG/1
1950 TABLET ORAL 3 TIMES DAILY
Status: DISCONTINUED | OUTPATIENT
Start: 2019-01-15 | End: 2019-01-15

## 2019-01-15 RX ORDER — SODIUM CHLORIDE 9 MG/ML
INJECTION, SOLUTION INTRAVENOUS CONTINUOUS
Status: CANCELLED | OUTPATIENT
Start: 2019-01-15

## 2019-01-15 RX ORDER — RAMELTEON 8 MG/1
8 TABLET ORAL NIGHTLY PRN
Status: DISCONTINUED | OUTPATIENT
Start: 2019-01-15 | End: 2019-01-18 | Stop reason: HOSPADM

## 2019-01-15 RX ORDER — FLUDROCORTISONE ACETATE 0.1 MG/1
200 TABLET ORAL ONCE
Status: COMPLETED | OUTPATIENT
Start: 2019-01-15 | End: 2019-01-15

## 2019-01-15 RX ORDER — SODIUM CHLORIDE 0.9 % (FLUSH) 0.9 %
10 SYRINGE (ML) INJECTION
Status: DISCONTINUED | OUTPATIENT
Start: 2019-01-15 | End: 2019-01-18 | Stop reason: HOSPADM

## 2019-01-15 RX ORDER — IBUPROFEN 200 MG
24 TABLET ORAL
Status: CANCELLED | OUTPATIENT
Start: 2019-01-15

## 2019-01-15 RX ORDER — DIPHENHYDRAMINE HYDROCHLORIDE 50 MG/ML
25 INJECTION INTRAMUSCULAR; INTRAVENOUS EVERY 4 HOURS PRN
Status: CANCELLED | OUTPATIENT
Start: 2019-01-15

## 2019-01-15 RX ORDER — SODIUM CHLORIDE 9 MG/ML
INJECTION, SOLUTION INTRAVENOUS CONTINUOUS
Status: DISCONTINUED | OUTPATIENT
Start: 2019-01-15 | End: 2019-01-18 | Stop reason: HOSPADM

## 2019-01-15 RX ORDER — LIDOCAINE HYDROCHLORIDE 10 MG/ML
1 INJECTION, SOLUTION EPIDURAL; INFILTRATION; INTRACAUDAL; PERINEURAL ONCE
Status: CANCELLED | OUTPATIENT
Start: 2019-01-15 | End: 2019-01-15

## 2019-01-15 RX ORDER — ONDANSETRON 4 MG/1
8 TABLET, ORALLY DISINTEGRATING ORAL EVERY 8 HOURS PRN
Status: CANCELLED | OUTPATIENT
Start: 2019-01-15

## 2019-01-15 RX ORDER — SIMVASTATIN 5 MG/1
10 TABLET, FILM COATED ORAL NIGHTLY
Status: DISCONTINUED | OUTPATIENT
Start: 2019-01-15 | End: 2019-01-18 | Stop reason: HOSPADM

## 2019-01-15 RX ADMIN — SODIUM BICARBONATE 650 MG TABLET 1950 MG: at 02:01

## 2019-01-15 RX ADMIN — SODIUM BICARBONATE 650 MG TABLET 650 MG: at 09:01

## 2019-01-15 RX ADMIN — BIMATOPROST 1 DROP: 0.1 SOLUTION/ DROPS OPHTHALMIC at 09:01

## 2019-01-15 RX ADMIN — FLUDROCORTISONE ACETATE 200 MCG: 0.1 TABLET ORAL at 05:01

## 2019-01-15 RX ADMIN — SIMVASTATIN 10 MG: 5 TABLET, FILM COATED ORAL at 09:01

## 2019-01-15 RX ADMIN — INSULIN ASPART 1 UNITS: 100 INJECTION, SOLUTION INTRAVENOUS; SUBCUTANEOUS at 11:01

## 2019-01-15 NOTE — TELEPHONE ENCOUNTER
Spoke to the patient's wife about his worsening hyponatremia, sodium of 125 and worsening renal function BUN 59 creatinine 3.6.  Patient will need to be admitted to the hospital.  We asked them to come to the admissions office.    He will need IV fluids to treat his hyponatremia and acute renal function.  We will also discuss the need for ileostomy reversal during this admission once electrolyte abnormalities correct

## 2019-01-15 NOTE — ASSESSMENT & PLAN NOTE
Monitor, cardiac evaluation.  The for echocardiogram to be repeated per Cardiology.  Will check a BNP

## 2019-01-15 NOTE — H&P
[facility] -   General Surgery  History & Physical    Patient Name: Yan Choudhury  MRN: [id number]  Admission Date: 1/15/2019  Attending Physician: Kevin Lindsay MD   Primary Care Provider: Sandra Estevez MD    Patient information was obtained from patient, spouse/SO and past medical records.     Subjective:     Chief Complaint/Reason for Admission:  Acute on chronic renal failure, hyponatremia, ileostomy, diabetes, cardiomyopathy    History of Present Illness: Patient is status post colostomy reversal.  He had a protective ileostomy done due to a leak. A Gastrografin enema showed no evidence of a leak. The drain fell out and was left out.    The patient has had issues with ileostomy output.  The ileostomy output ranges from 500-700 and occasionally 1400 mL a day.  This has created issues with renal failure and hyponatremia.  The patient is admitted now for correction of his hyponatremia and renal failure.  We will check his nutritional status.  Consideration will be given to ileostomy reversal during this admission.  Will ask Cardiology to see him to re-evaluate his cardiac risk says he has been at an increased risk for surgical procedures in the past    No current facility-administered medications on file prior to encounter.      Current Outpatient Medications on File Prior to Encounter   Medication Sig    allopurinol (ZYLOPRIM) 100 MG tablet TAKE 1 TABLET (100 MG TOTAL) BY MOUTH ONCE DAILY. (Patient taking differently: TAKE 1 TABLET (100 MG TOTAL) BY MOUTH ONCE DAILY./ takes in AM)    aspirin (ECOTRIN) 81 MG EC tablet Take 81 mg by mouth every morning.     BD INSULIN SYRINGE ULTRA-FINE 0.5 mL 31 gauge x 5/16 Syrg USE AS DIRECTED TO INJECT INSULIN TWO TIMES DAILY    bimatoprost (LUMIGAN) 0.03 % ophthalmic drops Place 1 drop into the left eye every evening. (Patient taking differently: Place 1 drop into the right eye every evening. )    blood sugar diagnostic Strp 1 strip by Misc.(Non-Drug; Combo  "Route) route 4 (four) times daily. [employer]    bumetanide (BUMEX) 2 MG tablet Take 1 tablet (2 mg total) by mouth 2 (two) times daily. (Patient taking differently: Take 2 mg by mouth every morning. )    carvedilol (COREG) 25 MG tablet TAKE 1/2 TABLET BY MOUTH TWICE A DAY WITH MEALS    cholecalciferol, vitamin D3, (VITAMIN D3) 1,000 unit capsule Take 1,000 Units by mouth once daily.    HYDROcodone-acetaminophen (NORCO) 5-325 mg per tablet One or 2 every 4-6 hours as needed for pain    insulin glargine (LANTUS) 100 unit/mL injection 50 units bid prn when BS< 150    IRON/VITAMIN B COMPLEX (GERITOL ORAL) Take by mouth.    lancets Misc For tel care    lisinopril (PRINIVIL,ZESTRIL) 5 MG tablet TAKE 1 TABLET (5 MG TOTAL) BY MOUTH ONCE DAILY. (Patient taking differently: Take 5 mg by mouth every morning. )    pantoprazole (PROTONIX) 40 MG tablet TAKE 1 TABLET BY MOUTH EVERY DAY FOR ACID REFLUX    pen needle, diabetic (BD ULTRA-FINE JOSLYN PEN NEEDLE) 32 gauge x 5/32" Ndle 1 each by Misc.(Non-Drug; Combo Route) route 4 (four) times daily.    simvastatin (ZOCOR) 10 MG tablet TAKE 1 TABLET (10 MG TOTAL) BY MOUTH EVERY EVENING.    sodium bicarbonate 650 MG tablet Take 3 tablets (1,950 mg total) by mouth 3 (three) times daily.       Review of patient's allergies indicates:  No Known Allergies    Past Medical History:   Diagnosis Date    Arthritis     CAD (coronary artery disease)     Cataract     CHF (congestive heart failure)     Dr Calvillo    Chronic combined systolic and diastolic congestive heart failure 3/24/2015    CKD (chronic kidney disease), stage III     Diabetes mellitus type II      AM    Diabetic retinopathy     anti Veg F injections for macular edema    Diverticulitis of colon     ED (erectile dysfunction)     Glaucoma     HTN (hypertension)     Hyperlipidemia     Ischemic cardiomyopathy     Obesity     JAHAIRA on CPAP     Pacemaker     Pulmonary HTN      Past Surgical History: "   Procedure Laterality Date    CARDIAC CATHETERIZATION  2009    CHOLECYSTECTOMY      COLECTOMY-SIGMOID N/A 4/22/2016    [facility]    COLONOSCOPY N/A 10/11/2018    [facility] ENDO    COLOSTOMY N/A 4/22/2016    [facility]    CREATION, ILEOSTOMY N/A 10/30/2018    [facility]    EYE SURGERY      HEMICOLECTOMY, RIGHT Right 10/30/2018    [facility]    KNEE SURGERY      MOBILIZATION, SPLENIC FLEXURE N/A 10/30/2018    [facility]    REVISION OR CLOSURE, COLOSTOMY N/A 10/30/2018    [facility]     Family History     Problem Relation (Age of Onset)    Cancer Mother    Heart disease Father    No Known Problems Sister, Brother, Maternal Aunt, Maternal Uncle, Paternal Aunt, Paternal Uncle, Maternal Grandmother, Maternal Grandfather, Paternal Grandmother, Paternal Grandfather    Stroke Father        Tobacco Use    Smoking status: Never Smoker    Smokeless tobacco: Never Used   Substance and Sexual Activity    Alcohol use: No     Alcohol/week: 0.0 oz     Frequency: Never    Drug use: No    Sexual activity: Not Currently     Review of Systems   Constitutional: Positive for fatigue.   HENT: Negative.    Eyes: Negative.    Respiratory: Negative.    Cardiovascular: Negative.    Gastrointestinal: Negative.         Ileostomy with leakage issue   Endocrine: Negative.    Genitourinary: Negative.    Musculoskeletal: Negative.    Skin: Negative.    Allergic/Immunologic: Negative.    Neurological: Negative.    Hematological: Negative.    Psychiatric/Behavioral: Negative.      Objective:     Vital Signs (Most Recent):  Temp: 97.8 °F (36.6 °C) (01/15/19 1142)  Pulse: 93 (01/15/19 1142)  Resp: 18 (01/15/19 1142)  BP: (!) 99/59 (01/15/19 1142)  SpO2: 99 % (01/15/19 1142) Vital Signs (24h  Range):  Temp:  [97.1 °F (36.2 °C)-98.6 °F (37 °C)] 97.8 °F (36.6 °C)  Pulse:  [93-96] 93  Resp:  [18] 18  SpO2:  [98 %-99 %] 99 %  BP: ()/(59-62) 99/59        There is no height or weight on file to calculate BMI.    Physical Exam   Constitutional: He appears well-developed and well-nourished.   Chronically ill   Neck: Normal range of motion. Neck supple.   Cardiovascular: Normal rate and regular rhythm.   Pulmonary/Chest: Effort normal and breath sounds normal.   Abdominal: Soft. Bowel sounds are normal.   Well-healed midline incision. Lower right-sided ileostomy with skin irritation   Musculoskeletal: Normal range of motion.   Neurological: He is alert.   Skin: Skin is warm. Capillary refill takes less than 2 seconds.   Skin irritation below and lateral to the ileostomy   Psychiatric: He has a normal mood and affect. His behavior is normal. Thought content normal.   Vitals reviewed.      Significant Labs:  CBC: No results for input(s): WBC, RBC, HGB, HCT, PLT, MCV, MCH, MCHC in the last 168 hours.  BMP:   Recent Labs   Lab 01/14/19  1440   *  167*   *  125*   K 4.8  4.8   CL 92*  92*   CO2 25  25   BUN 59*  59*   CREATININE 3.6*  3.6*   CALCIUM 10.0  10.0     CMP:   Recent Labs   Lab 01/14/19  1440   *  167*   CALCIUM 10.0  10.0   ALBUMIN 3.2*  3.2*   PROT 7.8   *  125*   K 4.8  4.8   CO2 25  25   CL 92*  92*   BUN 59*  59*   CREATININE 3.6*  3.6*   ALKPHOS 104   ALT 36   AST 33   BILITOT 0.5       Significant Diagnostics:  None    Assessment/Plan:     Hyperkalemia    Monitor     Hyponatremia    Normal saline at 100 mL an hour for the last Nephrology to see consider the use of hypertonic saline     Alteration in skin integrity    Irritated skin around the ileostomy, wound care     Acute renal failure    Normal saline at 100 mL an hour  Nephrology consultation for consideration of hypertonic saline     Gastrointestinal anastomotic leak    Gastrografin enema shows  that this has resolved.  Plan sigmoidoscopy later this week     Acute on chronic renal failure    Nephrology consultation normal saline at 100 mL an hour     Ileostomy in place    This is likely contributing to his electrolytes fluid abnormalities.  Will consider ileostomy reversal this admission.    Wound care consult for ileostomy care     Chronic systolic heart failure    Monitor     Biventricular ICD (implantable cardioverter-defibrillator) in place    Monitor with telemetry     CAD, multiple vessel    As cardiology re-evaluate the patient and comment on his risk for ileostomy reversal, from a cardiac point     Ischemic cardiomyopathy    Monitor, cardiac evaluation.  The for echocardiogram to be repeated per Cardiology.  Will check a BNP       VTE Risk Mitigation (From admission, onward)        Ordered     IP VTE LOW RISK PATIENT  Once      01/15/19 1115     Place sequential compression device  Until discontinued      01/15/19 1115          Kevin Lindsay MD  General Surgery  [facility] -

## 2019-01-15 NOTE — CONSULTS
[facility] -   Cardiology  Consult Note    Patient Name: Yan Choudhury  MRN: [id number]  Admission Date: 1/15/2019  Hospital Length of Stay: 0 days  Code Status: Full Code   Attending Provider: Kevin Lindsay MD   Consulting Provider: Luther Calvillo MD  Primary Care Physician: Sandra Estevez MD  Principal Problem:<principal problem not specified>      Patient information was obtained from patient, past medical records and ER records.     Consults  Subjective:     Chief Complaint:  Ostomy drainage     HPI:   Cardiology consulted for preop eval.  Pt well known for years, routinely followed in Cards clinic.  His current medical conditions include CAD/ICM, DCM, LBBB, HTN, CRI, DM, CRT-ICD, PTHN, dyslipidemia, obesity. Nonsmoker.   Past hx pertinent for following:  LHC was done 2009 showed diffuse CAD, and severe distal CAD involving apical LAD, distal RCA/distal LCX.   Echo Oct 2010 showed LVEF 25%.   PET stress 2016 showed inferior scar, no significant ischemia noted and LVEF < 35%.   s/p BIV ICD 3/16 for CHF/LBBB.  Echo 12/17 EF 20%, DD, mod PHTN.   Echo 10/18 EF read as 30%, mild-mod AI, DD, RVE, LVE.  He is s/p colostomy reversal/parastomal repair then needed ileostomy.  He now has ostomy leakage, and worsening electrolyte imbalance and is admitted to correct electrolytes and possible ostomy repair/reversal.  He seems stable from cardiac standpoint.  He denies chest pain sxs.  States he is not having any unusual dyspnea, pnd/orthopnea.  No leg edema.  ICD has not fired.  He has had multiple abdominal surgeries over last few years and has not had any acute cardiac events with surgery.          Past Medical History:   Diagnosis Date    Arthritis     CAD (coronary artery disease)     Cataract     CHF (congestive heart failure)     Dr Calvillo    Chronic combined systolic and diastolic congestive heart failure 3/24/2015    CKD (chronic kidney disease), stage III     Diabetes mellitus type II      AM     Diabetic retinopathy     anti Veg F injections for macular edema    Diverticulitis of colon     ED (erectile dysfunction)     Glaucoma     HTN (hypertension)     Hyperlipidemia     Ischemic cardiomyopathy     Obesity     JAHAIRA on CPAP     Pacemaker     Pulmonary HTN        Past Surgical History:   Procedure Laterality Date    CARDIAC CATHETERIZATION  2009    CHOLECYSTECTOMY      COLECTOMY-SIGMOID N/A 4/22/2016    [facility]    COLONOSCOPY N/A 10/11/2018    [facility] ENDO    COLOSTOMY N/A 4/22/2016    [facility]    CREATION, ILEOSTOMY N/A 10/30/2018    [facility]    EYE SURGERY      HEMICOLECTOMY, RIGHT Right 10/30/2018    [facility]    KNEE SURGERY      MOBILIZATION, SPLENIC FLEXURE N/A 10/30/2018    [facility]    REVISION OR CLOSURE, COLOSTOMY N/A 10/30/2018    [facility]       Review of patient's allergies indicates:  No Known Allergies    No current facility-administered medications on file prior to encounter.      Current Outpatient Medications on File Prior to Encounter   Medication Sig    allopurinol (ZYLOPRIM) 100 MG tablet TAKE 1 TABLET (100 MG TOTAL) BY MOUTH ONCE DAILY. (Patient taking differently: TAKE 1 TABLET (100 MG TOTAL) BY MOUTH ONCE DAILY./ takes in AM)    aspirin (ECOTRIN) 81 MG EC tablet Take 81 mg by mouth every morning.     BD INSULIN SYRINGE ULTRA-FINE 0.5 mL 31 gauge x 5/16 Syrg USE AS DIRECTED TO INJECT INSULIN TWO TIMES DAILY    bimatoprost (LUMIGAN) 0.03 % ophthalmic drops Place 1 drop into the left eye every evening. (Patient taking differently: Place 1 drop into the right eye every evening. )    blood sugar diagnostic Strp 1 strip by Misc.(Non-Drug; Combo Route) route 4 (four) times daily. [employer]    bumetanide (BUMEX) 2 MG  "tablet Take 1 tablet (2 mg total) by mouth 2 (two) times daily. (Patient taking differently: Take 2 mg by mouth every morning. )    cholecalciferol, vitamin D3, (VITAMIN D3) 1,000 unit capsule Take 1,000 Units by mouth once daily.    insulin glargine (LANTUS) 100 unit/mL injection 50 units bid prn when BS< 150    IRON/VITAMIN B COMPLEX (GERITOL ORAL) Take by mouth.    lisinopril (PRINIVIL,ZESTRIL) 5 MG tablet TAKE 1 TABLET (5 MG TOTAL) BY MOUTH ONCE DAILY. (Patient taking differently: Take 5 mg by mouth every morning. )    pantoprazole (PROTONIX) 40 MG tablet TAKE 1 TABLET BY MOUTH EVERY DAY FOR ACID REFLUX    simvastatin (ZOCOR) 10 MG tablet TAKE 1 TABLET (10 MG TOTAL) BY MOUTH EVERY EVENING.    sodium bicarbonate 650 MG tablet Take 3 tablets (1,950 mg total) by mouth 3 (three) times daily.    carvedilol (COREG) 25 MG tablet TAKE 1/2 TABLET BY MOUTH TWICE A DAY WITH MEALS    HYDROcodone-acetaminophen (NORCO) 5-325 mg per tablet One or 2 every 4-6 hours as needed for pain    lancets Misc For tel care    pen needle, diabetic (BD ULTRA-FINE JOSLYN PEN NEEDLE) 32 gauge x 5/32" Ndle 1 each by Misc.(Non-Drug; Combo Route) route 4 (four) times daily.     Family History     Problem Relation (Age of Onset)    Cancer Mother    Heart disease Father    No Known Problems Sister, Brother, Maternal Aunt, Maternal Uncle, Paternal Aunt, Paternal Uncle, Maternal Grandmother, Maternal Grandfather, Paternal Grandmother, Paternal Grandfather    Stroke Father        Tobacco Use    Smoking status: Never Smoker    Smokeless tobacco: Never Used   Substance and Sexual Activity    Alcohol use: No     Alcohol/week: 0.0 oz     Frequency: Never    Drug use: No    Sexual activity: Not Currently     Review of Systems   Constitution: Negative.   HENT: Negative.    Eyes: Negative.    Cardiovascular: Negative.    Respiratory: Negative.    Endocrine: Negative.    Hematologic/Lymphatic: Negative.    Skin: Negative.    Musculoskeletal: " Negative.    Gastrointestinal: Positive for change in bowel habit.   Genitourinary: Negative.    Neurological: Negative.    Psychiatric/Behavioral: Negative.    Allergic/Immunologic: Negative.      Objective:     Vital Signs (Most Recent):  Temp: 97.8 °F (36.6 °C) (01/15/19 1142)  Pulse: 93 (01/15/19 1142)  Resp: 18 (01/15/19 1142)  BP: (!) 99/59 (01/15/19 1142)  SpO2: 99 % (01/15/19 1142) Vital Signs (24h Range):  Temp:  [97.1 °F (36.2 °C)-97.8 °F (36.6 °C)] 97.8 °F (36.6 °C)  Pulse:  [93-94] 93  Resp:  [18] 18  SpO2:  [98 %-99 %] 99 %  BP: ()/(59-62) 99/59        There is no height or weight on file to calculate BMI.    SpO2: 99 %  O2 Device (Oxygen Therapy): room air      Intake/Output Summary (Last 24 hours) at 1/15/2019 1452  Last data filed at 1/15/2019 1300  Gross per 24 hour   Intake --   Output 250 ml   Net -250 ml       Lines/Drains/Airways     Drain                 Closed/Suction Drain 10/30/18 1300 Abdomen Bulb 77 days         Ileostomy 10/30/18 Loop RUQ 77 days                Physical Exam   Constitutional: He is oriented to person, place, and time. He appears well-developed and well-nourished.   HENT:   Head: Normocephalic.   Neck: Normal range of motion. Neck supple. Normal carotid pulses, no hepatojugular reflux and no JVD present. Carotid bruit is not present. No thyromegaly present.   Cardiovascular: Normal rate, regular rhythm, S1 normal and S2 normal. PMI is not displaced. Exam reveals no S3, no S4, no distant heart sounds, no friction rub, no midsystolic click and no opening snap.   No murmur heard.  Pulses:       Radial pulses are 2+ on the right side, and 2+ on the left side.   Pulmonary/Chest: Effort normal and breath sounds normal. He has no wheezes. He has no rales.   Abdominal: Soft. Bowel sounds are normal. He exhibits no distension, no abdominal bruit, no ascites and no mass. There is no tenderness.   Ostomy bag noted with leakage   Musculoskeletal: He exhibits no edema.    Neurological: He is alert and oriented to person, place, and time.   Skin: Skin is warm.   Psychiatric: He has a normal mood and affect. His behavior is normal.   Nursing note and vitals reviewed.      Significant Labs:   CMP   Recent Labs   Lab 01/14/19  1440 01/15/19  1135   *  125* 126*  126*   K 4.8  4.8 5.5*  5.5*   CL 92*  92* 93*  93*   CO2 25  25 24  24   *  167* 124*  124*   BUN 59*  59* 56*  56*   CREATININE 3.6*  3.6* 3.1*  3.1*   CALCIUM 10.0  10.0 10.2  10.2   PROT 7.8  --    ALBUMIN 3.2*  3.2*  --    BILITOT 0.5  --    ALKPHOS 104  --    AST 33  --    ALT 36  --    ANIONGAP 8  8 9  9   ESTGFRAFRICA 19.3*  19.3* 23*  23*   EGFRNONAA 16.7*  16.7* 20*  20*   , CBC No results for input(s): WBC, HGB, HCT, PLT in the last 48 hours., INR No results for input(s): INR, PROTIME in the last 48 hours., Lipid Panel No results for input(s): CHOL, HDL, LDLCALC, TRIG, CHOLHDL in the last 48 hours. and Troponin No results for input(s): TROPONINI in the last 48 hours.    Significant Imaging: Echocardiogram:   2D echo with color flow doppler:   Results for orders placed or performed in visit on 10/01/18   2D echo with color flow doppler   Result Value Ref Range    QEF 30 (A) 55 - 65    Diastolic Dysfunction Yes (A)     Aortic Valve Regurgitation MILD TO MODERATE (A)     Est. PA Systolic Pressure 35.49     Tricuspid Valve Regurgitation MILD      Assessment and Plan:     PT WELL KNOWN, STABLE CV STATUS.  PT MAY PROCEED WITH ABDOMINAL SURGERY IF NEEDED AT MODERATE-HIGH CV RISK.  HE HAS HAD MULTIPLE SURGERIES WITH NO ADVERSE CV EVENTS.  CONTINUE OMT FOR CAD/CHF.  CORRECT ELECTROLYTES  NEPHROLOGY INPUT  WILL BE AVAILABLE AS NEEDED.      Chronic combined systolic and diastolic congestive heart failure    See management plan detailed above.      Acute on chronic renal failure    See management plan detailed above.      Ileostomy in place    See management plan detailed above.      Chronic  systolic heart failure    See management plan detailed above.      Biventricular ICD (implantable cardioverter-defibrillator) in place    See management plan detailed above.      Primary open-angle glaucoma, moderate stage    See management plan detailed above.      CAD, multiple vessel    See management plan detailed above.      CKD (chronic kidney disease) stage 4, GFR 15-29 ml/min    See management plan detailed above.      Ischemic cardiomyopathy    See management plan detailed above.      Hypertension associated with diabetes    See management plan detailed above.          VTE Risk Mitigation (From admission, onward)        Ordered     IP VTE LOW RISK PATIENT  Once      01/15/19 1115     Place sequential compression device  Until discontinued      01/15/19 1115          Thank you for your consult.    Luther Calvillo MD  Cardiology   [facility] -

## 2019-01-15 NOTE — SUBJECTIVE & OBJECTIVE
Past Medical History:   Diagnosis Date    Arthritis     CAD (coronary artery disease)     Cataract     CHF (congestive heart failure)     Dr Calvillo    Chronic combined systolic and diastolic congestive heart failure 3/24/2015    CKD (chronic kidney disease), stage III     Diabetes mellitus type II      AM    Diabetic retinopathy     anti Veg F injections for macular edema    Diverticulitis of colon     ED (erectile dysfunction)     Glaucoma     HTN (hypertension)     Hyperlipidemia     Ischemic cardiomyopathy     Obesity     JAHAIRA on CPAP     Pacemaker     Pulmonary HTN        Past Surgical History:   Procedure Laterality Date    CARDIAC CATHETERIZATION  2009    CHOLECYSTECTOMY      COLECTOMY-SIGMOID N/A 4/22/2016    [facility]    COLONOSCOPY N/A 10/11/2018    [facility] Quinn Aragon [facility] ENDO    COLOSTOMY N/A 4/22/2016    [facility]    CREATION, ILEOSTOMY N/A 10/30/2018    [facility] OR    EYE SURGERY      HEMICOLECTOMY, RIGHT Right 10/30/2018    [facility]    KNEE SURGERY      MOBILIZATION, SPLENIC FLEXURE N/A 10/30/2018    [facility] OR    REVISION OR CLOSURE, COLOSTOMY N/A 10/30/2018    [facility] by [facility] OR       Review of patient's allergies indicates:  No Known Allergies    No current facility-administered medications on file prior to encounter.      Current Outpatient Medications on File Prior to Encounter   Medication Sig    allopurinol (ZYLOPRIM) 100 MG tablet TAKE 1 TABLET (100 MG TOTAL) BY MOUTH ONCE DAILY. (Patient taking differently: TAKE 1 TABLET (100 MG TOTAL) BY MOUTH ONCE DAILY./ takes in AM)    aspirin (ECOTRIN) 81 MG EC tablet Take 81 mg by mouth every morning.     BD INSULIN SYRINGE ULTRA-FINE 0.5 mL 31 gauge x 5/16 Syrg USE AS DIRECTED TO INJECT INSULIN TWO TIMES  "DAILY    bimatoprost (LUMIGAN) 0.03 % ophthalmic drops Place 1 drop into the left eye every evening. (Patient taking differently: Place 1 drop into the right eye every evening. )    blood sugar diagnostic Strp 1 strip by Misc.(Non-Drug; Combo Route) route 4 (four) times daily. [employer]    bumetanide (BUMEX) 2 MG tablet Take 1 tablet (2 mg total) by mouth 2 (two) times daily. (Patient taking differently: Take 2 mg by mouth every morning. )    cholecalciferol, vitamin D3, (VITAMIN D3) 1,000 unit capsule Take 1,000 Units by mouth once daily.    insulin glargine (LANTUS) 100 unit/mL injection 50 units bid prn when BS< 150    IRON/VITAMIN B COMPLEX (GERITOL ORAL) Take by mouth.    lisinopril (PRINIVIL,ZESTRIL) 5 MG tablet TAKE 1 TABLET (5 MG TOTAL) BY MOUTH ONCE DAILY. (Patient taking differently: Take 5 mg by mouth every morning. )    pantoprazole (PROTONIX) 40 MG tablet TAKE 1 TABLET BY MOUTH EVERY DAY FOR ACID REFLUX    simvastatin (ZOCOR) 10 MG tablet TAKE 1 TABLET (10 MG TOTAL) BY MOUTH EVERY EVENING.    sodium bicarbonate 650 MG tablet Take 3 tablets (1,950 mg total) by mouth 3 (three) times daily.    carvedilol (COREG) 25 MG tablet TAKE 1/2 TABLET BY MOUTH TWICE A DAY WITH MEALS    HYDROcodone-acetaminophen (NORCO) 5-325 mg per tablet One or 2 every 4-6 hours as needed for pain    lancets Misc For tel care    pen needle, diabetic (BD ULTRA-FINE JOSLYN PEN NEEDLE) 32 gauge x 5/32" Ndle 1 each by Misc.(Non-Drug; Combo Route) route 4 (four) times daily.     Family History     Problem Relation (Age of Onset)    Cancer Mother    Heart disease Father    No Known Problems Sister, Brother, Maternal Aunt, Maternal Uncle, Paternal Aunt, Paternal Uncle, Maternal Grandmother, Maternal Grandfather, Paternal Grandmother, Paternal Grandfather    Stroke Father        Tobacco Use    Smoking status: Never Smoker    Smokeless tobacco: Never Used   Substance and Sexual Activity    Alcohol use: No     Alcohol/week: 0.0 " oz     Frequency: Never    Drug use: No    Sexual activity: Not Currently     Review of Systems   Constitution: Negative.   HENT: Negative.    Eyes: Negative.    Cardiovascular: Negative.    Respiratory: Negative.    Endocrine: Negative.    Hematologic/Lymphatic: Negative.    Skin: Negative.    Musculoskeletal: Negative.    Gastrointestinal: Positive for change in bowel habit.   Genitourinary: Negative.    Neurological: Negative.    Psychiatric/Behavioral: Negative.    Allergic/Immunologic: Negative.      Objective:     Vital Signs (Most Recent):  Temp: 97.8 °F (36.6 °C) (01/15/19 1142)  Pulse: 93 (01/15/19 1142)  Resp: 18 (01/15/19 1142)  BP: (!) 99/59 (01/15/19 1142)  SpO2: 99 % (01/15/19 1142) Vital Signs (24h Range):  Temp:  [97.1 °F (36.2 °C)-97.8 °F (36.6 °C)] 97.8 °F (36.6 °C)  Pulse:  [93-94] 93  Resp:  [18] 18  SpO2:  [98 %-99 %] 99 %  BP: ()/(59-62) 99/59        There is no height or weight on file to calculate BMI.    SpO2: 99 %  O2 Device (Oxygen Therapy): room air      Intake/Output Summary (Last 24 hours) at 1/15/2019 1452  Last data filed at 1/15/2019 1300  Gross per 24 hour   Intake --   Output 250 ml   Net -250 ml       Lines/Drains/Airways     Drain                 Closed/Suction Drain 10/30/18 1300 Abdomen Bulb 77 days         Ileostomy 10/30/18 Loop RUQ 77 days                Physical Exam   Constitutional: He is oriented to person, place, and time. He appears well-developed and well-nourished.   HENT:   Head: Normocephalic.   Neck: Normal range of motion. Neck supple. Normal carotid pulses, no hepatojugular reflux and no JVD present. Carotid bruit is not present. No thyromegaly present.   Cardiovascular: Normal rate, regular rhythm, S1 normal and S2 normal. PMI is not displaced. Exam reveals no S3, no S4, no distant heart sounds, no friction rub, no midsystolic click and no opening snap.   No murmur heard.  Pulses:       Radial pulses are 2+ on the right side, and 2+ on the left side.    Pulmonary/Chest: Effort normal and breath sounds normal. He has no wheezes. He has no rales.   Abdominal: Soft. Bowel sounds are normal. He exhibits no distension, no abdominal bruit, no ascites and no mass. There is no tenderness.   Ostomy bag noted with leakage   Musculoskeletal: He exhibits no edema.   Neurological: He is alert and oriented to person, place, and time.   Skin: Skin is warm.   Psychiatric: He has a normal mood and affect. His behavior is normal.   Nursing note and vitals reviewed.      Significant Labs:   CMP   Recent Labs   Lab 01/14/19  1440 01/15/19  1135   *  125* 126*  126*   K 4.8  4.8 5.5*  5.5*   CL 92*  92* 93*  93*   CO2 25  25 24  24   *  167* 124*  124*   BUN 59*  59* 56*  56*   CREATININE 3.6*  3.6* 3.1*  3.1*   CALCIUM 10.0  10.0 10.2  10.2   PROT 7.8  --    ALBUMIN 3.2*  3.2*  --    BILITOT 0.5  --    ALKPHOS 104  --    AST 33  --    ALT 36  --    ANIONGAP 8  8 9  9   ESTGFRAFRICA 19.3*  19.3* 23*  23*   EGFRNONAA 16.7*  16.7* 20*  20*   , CBC No results for input(s): WBC, HGB, HCT, PLT in the last 48 hours., INR No results for input(s): INR, PROTIME in the last 48 hours., Lipid Panel No results for input(s): CHOL, HDL, LDLCALC, TRIG, CHOLHDL in the last 48 hours. and Troponin No results for input(s): TROPONINI in the last 48 hours.    Significant Imaging: Echocardiogram:   2D echo with color flow doppler:   Results for orders placed or performed in visit on 10/01/18   2D echo with color flow doppler   Result Value Ref Range    QEF 30 (A) 55 - 65    Diastolic Dysfunction Yes (A)     Aortic Valve Regurgitation MILD TO MODERATE (A)     Est. PA Systolic Pressure 35.49     Tricuspid Valve Regurgitation MILD

## 2019-01-15 NOTE — CONSULTS
"Consulted on this 62 Y/o M patient due to present on admission Ileostomy with sweta stomal skin breakdown. Ileostomy was placed 10/30/18 and patient has had multiple readmissions due to electrolyte imbalance.  He states he changes his pouch every day because it "gets too full". Reinforced prior ostomy teaching to empty before it is half full, and to check frequently for need to empty to try to stretch the length of pouch wear.  Patient's pouch was leaking at arrival to hospital, and new appliance applied per primary nurse. Emptied of 150 ml green/brown thick liquid stool at this time.  Pouch intact, however, is lifting at distal edge due to denuded sweta stomal skin, so pouch removed.  Stoma measures 30mm, pink and moist.  Sweta stomal skin circumferentially is denuded, extending out 0.5cm, and additional partial thickness breakdown noted distally in scattered areas extending down 8cm.  Cleansed all with water and patted dry.  Stoma powder and cavilon barrier spray applied to Denuded sweta stomal skin using crusting technique.  Duoderm then cut with hole larger than stoma, and applied surrounding stoma to cover and protect denuded skin distal to stoma.  Moldable barrier ring then applied around stoma, and one piece sensura Megargel pouch cut to size and applied, pressing to seal.  Patient tolerated well. Discussed changes in care with him and his wife.  Dr. Lindsay's notes reviewed. Potential for Ileostomy reversal this hospitalization.  Please see below:    Please review Stevo's procedure "Pouching : Colostomy or Ileostomy" for instructions on ostomy.stoma care. Change ostomy appliance weekly or IMMEDIATELY IF LEAKING. All ostomy care supplies can be requested from materials management.    1. Cleanse with water  2. Pat dry  3. Crust sweta stomal skin by dusting lightly with stoma powder, then spraying with cavilon skin barrier film. Repeat layers x3, ending with spray  4. Cut hole larger than stoma in Duoderm, then apply " to surrounding skin of Ileostomy, covering denuded skin  5. Apply brava moldable ring around stoma  6. Cut one piece pouch to 30mm and apply, pressing to seal    OSTOMY CARE SUPPLIES:  One Piece Pouch #[id number]    Brava Stoma Ring #[id number]    Cavilon Spray #[id number]    Ostomy Powder #[id number]

## 2019-01-15 NOTE — HPI
Cardiology consulted for preop eval.  Pt well known for years, routinely followed in Cards clinic.  His current medical conditions include CAD/ICM, DCM, LBBB, HTN, CRI, DM, CRT-ICD, PTHN, dyslipidemia, obesity. Nonsmoker.   Past hx pertinent for following:  LHC was done 2009 showed diffuse CAD, and severe distal CAD involving apical LAD, distal RCA/distal LCX.   Echo Oct 2010 showed LVEF 25%.   PET stress 2016 showed inferior scar, no significant ischemia noted and LVEF < 35%.   s/p BIV ICD 3/16 for CHF/LBBB.  Echo 12/17 EF 20%, DD, mod PHTN.   Echo 10/18 EF read as 30%, mild-mod AI, DD, RVE, LVE.  He is s/p colostomy reversal/parastomal repair then needed ileostomy.  He now has ostomy leakage, and worsening electrolyte imbalance and is admitted to correct electrolytes and possible ostomy repair/reversal.  He seems stable from cardiac standpoint.  He denies chest pain sxs.  States he is not having any unusual dyspnea, pnd/orthopnea.  No leg edema.  ICD has not fired.  He has had multiple abdominal surgeries over last few years and has not had any acute cardiac events with surgery.

## 2019-01-15 NOTE — ASSESSMENT & PLAN NOTE
Normal saline at 100 mL an hour for the last Nephrology to see consider the use of hypertonic saline

## 2019-01-15 NOTE — ASSESSMENT & PLAN NOTE
As cardiology re-evaluate the patient and comment on his risk for ileostomy reversal, from a cardiac point

## 2019-01-15 NOTE — HOSPITAL COURSE
Patient was admitted with hyponatremia and acute renal failure.  He was started on IV fluids after a PICC line was placed.  Nephrology and Cardiology were asked to evaluate the patient.    01/16/2019.  Patient had a PICC line placed last night he was started on IV fluids.  He has a new bag on his ileostomy with less leakage    1/1719:  Feeling better, increased by acceptable risk for ileostomy closure    1//8/19:  For Flex sig today, will send home with home health, can be re admitted on Tuesday for ileostomy reversal

## 2019-01-15 NOTE — SUBJECTIVE & OBJECTIVE
Past Medical History:   Diagnosis Date    Arthritis     CAD (coronary artery disease)     Cataract     CHF (congestive heart failure)     Dr Calvillo    Chronic combined systolic and diastolic congestive heart failure 3/24/2015    CKD (chronic kidney disease), stage III     Diabetes mellitus type II      AM    Diabetic retinopathy     anti Veg F injections for macular edema    Diverticulitis of colon     ED (erectile dysfunction)     Glaucoma     HTN (hypertension)     Hyperlipidemia     Ischemic cardiomyopathy     Obesity     JAHAIRA on CPAP     Pacemaker     Pulmonary HTN        Past Surgical History:   Procedure Laterality Date    CARDIAC CATHETERIZATION  2009    CHOLECYSTECTOMY      COLECTOMY-SIGMOID N/A 4/22/2016    [facility] OR    COLONOSCOPY N/A 10/11/2018    [facility] Quinn [facility]agon [facility] ENDO    COLOSTOMY N/A 4/22/2016    [facility]    CREATION, ILEOSTOMY N/A 10/30/2018    [facility]    EYE SURGERY      HEMICOLECTOMY, RIGHT Right 10/30/2018    [facility]    KNEE SURGERY      MOBILIZATION, SPLENIC FLEXURE N/A 10/30/2018    [facility]    REVISION OR CLOSURE, COLOSTOMY N/A 10/30/2018    [facility]formed by [facility] OR       Review of patient's allergies indicates:  No Known Allergies  Current Facility-Administered Medications   Medication Frequency    0.9%  NaCl infusion Continuous    acetaminophen tablet 650 mg Q8H PRN    allopurinol split tablet 50 mg Daily    bimatoprost 0.01 % ophthalmic drops 1 drop QHS    dextrose 50% injection 12.5 g PRN    dextrose 50% injection 25 g PRN    fludrocortisone tablet 200 mcg Once    glucagon (human recombinant) injection 1 mg PRN    glucose chewable tablet 16 g PRN    glucose chewable tablet 24 g PRN    HYDROcodone-acetaminophen 5-325 mg per  tablet 1 tablet Q6H PRN    insulin aspart U-100 pen 1-10 Units QID (AC + HS) PRN    lidocaine (PF) 10 mg/ml (1%) injection 10 mg Once    ondansetron disintegrating tablet 8 mg Q8H PRN    promethazine (PHENERGAN) 6.25 mg in dextrose 5 % 50 mL IVPB Q6H PRN    ramelteon tablet 8 mg Nightly PRN    simvastatin tablet 10 mg QHS    sodium bicarbonate tablet 650 mg TID     Family History     Problem Relation (Age of Onset)    Cancer Mother    Heart disease Father    No Known Problems Sister, Brother, Maternal Aunt, Maternal Uncle, Paternal Aunt, Paternal Uncle, Maternal Grandmother, Maternal Grandfather, Paternal Grandmother, Paternal Grandfather    Stroke Father        Tobacco Use    Smoking status: Never Smoker    Smokeless tobacco: Never Used   Substance and Sexual Activity    Alcohol use: No     Alcohol/week: 0.0 oz     Frequency: Never    Drug use: No    Sexual activity: Not Currently     Review of Systems   Constitutional: Positive for activity change and fatigue. Negative for appetite change.   HENT: Negative for congestion and facial swelling.    Eyes: Negative for pain, discharge and redness.   Respiratory: Negative for apnea, cough and chest tightness.    Cardiovascular: Negative for chest pain, palpitations and leg swelling.   Gastrointestinal: Negative for abdominal distention.   Genitourinary: Negative for difficulty urinating, dysuria and frequency.   Musculoskeletal: Positive for arthralgias. Negative for neck pain and neck stiffness.   Skin: Negative for color change, rash and wound.   Neurological: Positive for weakness. Negative for dizziness and numbness.   Psychiatric/Behavioral: Negative for sleep disturbance.   All other systems reviewed and are negative.    Objective:     Vital Signs (Most Recent):  Temp: 97.8 °F (36.6 °C) (01/15/19 1142)  Pulse: 93 (01/15/19 1142)  Resp: 18 (01/15/19 1142)  BP: (!) 99/59 (01/15/19 1142)  SpO2: 99 % (01/15/19 1142)  O2 Device (Oxygen Therapy): room air  (01/15/19 1142) Vital Signs (24h Range):  Temp:  [97.1 °F (36.2 °C)-97.8 °F (36.6 °C)] 97.8 °F (36.6 °C)  Pulse:  [93-94] 93  Resp:  [18] 18  SpO2:  [98 %-99 %] 99 %  BP: ()/(59-62) 99/59        There is no height or weight on file to calculate BMI.  There is no height or weight on file to calculate BSA.    No intake/output data recorded.    Physical Exam   Constitutional: He is oriented to person, place, and time. He appears well-developed. No distress.   HENT:   Head: Normocephalic and atraumatic.   Eyes: Pupils are equal, round, and reactive to light.   Neck: Normal range of motion. Neck supple. No tracheal deviation present. No thyromegaly present.   Cardiovascular: Normal rate, regular rhythm and intact distal pulses. Exam reveals no gallop and no friction rub.   Murmur heard.  Pulmonary/Chest: Breath sounds normal. He has no wheezes. He has no rales.   Abdominal: Soft. He exhibits no mass. There is no tenderness. There is no rebound and no guarding.   Ileostomy bag in place    Musculoskeletal: Normal range of motion. He exhibits no edema.   Lymphadenopathy:     He has no cervical adenopathy.   Neurological: He is alert and oriented to person, place, and time.   Skin: Skin is warm. No rash noted. He is not diaphoretic. No erythema.   Nursing note and vitals reviewed.      Significant Labs:  CBC: No results for input(s): WBC, RBC, HGB, HCT, PLT, MCV, MCH, MCHC in the last 168 hours.  CMP:   Recent Labs   Lab 01/14/19  1440 01/15/19  1135   *  167* 124*  124*   CALCIUM 10.0  10.0 10.2  10.2   ALBUMIN 3.2*  3.2*  --    PROT 7.8  --    *  125* 126*  126*   K 4.8  4.8 5.5*  5.5*   CO2 25  25 24  24   CL 92*  92* 93*  93*   BUN 59*  59* 56*  56*   CREATININE 3.6*  3.6* 3.1*  3.1*   ALKPHOS 104  --    ALT 36  --    AST 33  --    BILITOT 0.5  --      Coagulation: No results for input(s): PT, INR, APTT in the last 168 hours.  LFTs:   Recent Labs   Lab 01/14/19  1440   ALT 36   AST 33    ALKPHOS 104   BILITOT 0.5   PROT 7.8   ALBUMIN 3.2*  3.2*     All labs within the past 24 hours have been reviewed.    Significant Imaging:  Reviewed     Lab Results   Component Value Date    .0 (H) 08/10/2018    CALCIUM 10.2 01/15/2019    CALCIUM 10.2 01/15/2019    PHOS 2.5 (L) 01/14/2019       Lab Results   Component Value Date    ALBUMIN 3.2 (L) 01/14/2019    ALBUMIN 3.2 (L) 01/14/2019       Lab Results   Component Value Date    URICACID 16.4 (H) 11/15/2018

## 2019-01-15 NOTE — ASSESSMENT & PLAN NOTE
1. BROOKE on CKD stage 3/4 :  Acute kidney injury on chronic kidney disease likely due to dehydration, continue IV fluids, monitor fluid status closely due to history of congestive heart failure, follow renal function closely, baseline creatinine about 1.5-2 , admission creatinine is 3 mg/dL,    2.  Hyperkalemia - due to acute kidney injury, discontinue Ace inhibitors, will give a dose of fludrocortisone, continue hydration, expect potassium levels to improve with improvement in kidney function    3.  Metabolic acidosis -  on sodium bicarbonate supplements,    4.  Hyponatremia - acute on chronic, worsening in serum sodium levels due to acute kidney injury and dehydration, continue IV fluids and monitor serum sodium levels closely,    5.  Anemia of chronic kidney disease - stable hemoglobin, PRBC transfusion when indicated,    6.  Possible ileostomy reversal when clinically stable

## 2019-01-15 NOTE — CONSULTS
[facility] -   Nephrology  Consult Note      Patient Name: Yan Choudhury  MRN: [id number]  Admission Date: 1/15/2019  Hospital Length of Stay: 0 days  Attending Provider: Kevin Lindsay MD   Primary Care Physician: Sandra Estevez MD  Principal Problem:<principal problem not specified>    Consults  Subjective:     HPI: Yan Choudhury is a 64 y/o AA man with history of hypertension, congestive heart failure, cardiomyopathy, chronic kidney disease stage 3, baseline creatinine about 1.5-2, chronic and mild hyponatremia, he had been having complications from his ileostomy, increased ileostomy outputs, was admitted to the hospital for abnormal labs including hyponatremia and worsening renal function, serum creatinine increased to 3.5 mg/dL and sodium worsened to 125, patient admitted for gentle hydration and once clinically stable to Plan reversal of his ileostomy, he has history of combined systolic and diastolic heart failure with ejection fraction of about 30%, mild hyperkalemia noted on his labs likely due to Ace inhibitors he was on prior to admission and also acute on chronic kidney failure.      Past Medical History:   Diagnosis Date    Arthritis     CAD (coronary artery disease)     Cataract     CHF (congestive heart failure)     Dr Calvillo    Chronic combined systolic and diastolic congestive heart failure 3/24/2015    CKD (chronic kidney disease), stage III     Diabetes mellitus type II      AM    Diabetic retinopathy     anti Veg F injections for macular edema    Diverticulitis of colon     ED (erectile dysfunction)     Glaucoma     HTN (hypertension)     Hyperlipidemia     Ischemic cardiomyopathy     Obesity     JAHAIRA on CPAP     Pacemaker     Pulmonary HTN        Past Surgical History:   Procedure Laterality Date    CARDIAC CATHETERIZATION  2009    CHOLECYSTECTOMY      COLECTOMY-SIGMOID N/A 4/22/2016    [facility]    COLONOSCOPY N/A 10/11/2018     [facility]    COLOSTOMY N/A 4/22/2016    [facility] OR    CREATION, ILEOSTOMY N/A 10/30/2018    [facility] OR    EYE SURGERY      HEMICOLECTOMY, RIGHT Right 10/30/2018    [facility] OR    KNEE SURGERY      MOBILIZATION, SPLENIC FLEXURE N/A 10/30/2018    [facility] OR    REVISION OR CLOSURE, COLOSTOMY N/A 10/30/2018    [facility] OR       Review of patient's allergies indicates:  No Known Allergies  Current Facility-Administered Medications   Medication Frequency    0.9%  NaCl infusion Continuous    acetaminophen tablet 650 mg Q8H PRN    allopurinol split tablet 50 mg Daily    bimatoprost 0.01 % ophthalmic drops 1 drop QHS    dextrose 50% injection 12.5 g PRN    dextrose 50% injection 25 g PRN    fludrocortisone tablet 200 mcg Once    glucagon (human recombinant) injection 1 mg PRN    glucose chewable tablet 16 g PRN    glucose chewable tablet 24 g PRN    HYDROcodone-acetaminophen 5-325 mg per tablet 1 tablet Q6H PRN    insulin aspart U-100 pen 1-10 Units QID (AC + HS) PRN    lidocaine (PF) 10 mg/ml (1%) injection 10 mg Once    ondansetron disintegrating tablet 8 mg Q8H PRN    promethazine (PHENERGAN) 6.25 mg in dextrose 5 % 50 mL IVPB Q6H PRN    ramelteon tablet 8 mg Nightly PRN    simvastatin tablet 10 mg QHS    sodium bicarbonate tablet 650 mg TID     Family History     Problem Relation (Age of Onset)    Cancer Mother    Heart disease Father    No Known Problems Sister, Brother, Maternal Aunt, Maternal Uncle, Paternal Aunt, Paternal Uncle, Maternal Grandmother, Maternal Grandfather, Paternal Grandmother, Paternal Grandfather    Stroke Father        Tobacco Use    Smoking status: Never Smoker    Smokeless tobacco: Never Used   Substance and Sexual Activity    Alcohol use: No     Alcohol/week: 0.0  oz     Frequency: Never    Drug use: No    Sexual activity: Not Currently     Review of Systems   Constitutional: Positive for activity change and fatigue. Negative for appetite change.   HENT: Negative for congestion and facial swelling.    Eyes: Negative for pain, discharge and redness.   Respiratory: Negative for apnea, cough and chest tightness.    Cardiovascular: Negative for chest pain, palpitations and leg swelling.   Gastrointestinal: Negative for abdominal distention.   Genitourinary: Negative for difficulty urinating, dysuria and frequency.   Musculoskeletal: Positive for arthralgias. Negative for neck pain and neck stiffness.   Skin: Negative for color change, rash and wound.   Neurological: Positive for weakness. Negative for dizziness and numbness.   Psychiatric/Behavioral: Negative for sleep disturbance.   All other systems reviewed and are negative.    Objective:     Vital Signs (Most Recent):  Temp: 97.8 °F (36.6 °C) (01/15/19 1142)  Pulse: 93 (01/15/19 1142)  Resp: 18 (01/15/19 1142)  BP: (!) 99/59 (01/15/19 1142)  SpO2: 99 % (01/15/19 1142)  O2 Device (Oxygen Therapy): room air (01/15/19 1142) Vital Signs (24h Range):  Temp:  [97.1 °F (36.2 °C)-97.8 °F (36.6 °C)] 97.8 °F (36.6 °C)  Pulse:  [93-94] 93  Resp:  [18] 18  SpO2:  [98 %-99 %] 99 %  BP: ()/(59-62) 99/59        There is no height or weight on file to calculate BMI.  There is no height or weight on file to calculate BSA.    No intake/output data recorded.    Physical Exam   Constitutional: He is oriented to person, place, and time. He appears well-developed. No distress.   HENT:   Head: Normocephalic and atraumatic.   Eyes: Pupils are equal, round, and reactive to light.   Neck: Normal range of motion. Neck supple. No tracheal deviation present. No thyromegaly present.   Cardiovascular: Normal rate, regular rhythm and intact distal pulses. Exam reveals no gallop and no friction rub.   Murmur heard.  Pulmonary/Chest: Breath sounds  normal. He has no wheezes. He has no rales.   Abdominal: Soft. He exhibits no mass. There is no tenderness. There is no rebound and no guarding.   Ileostomy bag in place    Musculoskeletal: Normal range of motion. He exhibits no edema.   Lymphadenopathy:     He has no cervical adenopathy.   Neurological: He is alert and oriented to person, place, and time.   Skin: Skin is warm. No rash noted. He is not diaphoretic. No erythema.   Nursing note and vitals reviewed.      Significant Labs:  CBC: No results for input(s): WBC, RBC, HGB, HCT, PLT, MCV, MCH, MCHC in the last 168 hours.  CMP:   Recent Labs   Lab 01/14/19  1440 01/15/19  1135   *  167* 124*  124*   CALCIUM 10.0  10.0 10.2  10.2   ALBUMIN 3.2*  3.2*  --    PROT 7.8  --    *  125* 126*  126*   K 4.8  4.8 5.5*  5.5*   CO2 25  25 24  24   CL 92*  92* 93*  93*   BUN 59*  59* 56*  56*   CREATININE 3.6*  3.6* 3.1*  3.1*   ALKPHOS 104  --    ALT 36  --    AST 33  --    BILITOT 0.5  --      Coagulation: No results for input(s): PT, INR, APTT in the last 168 hours.  LFTs:   Recent Labs   Lab 01/14/19  1440   ALT 36   AST 33   ALKPHOS 104   BILITOT 0.5   PROT 7.8   ALBUMIN 3.2*  3.2*     All labs within the past 24 hours have been reviewed.    Significant Imaging:  Reviewed     Lab Results   Component Value Date    .0 (H) 08/10/2018    CALCIUM 10.2 01/15/2019    CALCIUM 10.2 01/15/2019    PHOS 2.5 (L) 01/14/2019       Lab Results   Component Value Date    ALBUMIN 3.2 (L) 01/14/2019    ALBUMIN 3.2 (L) 01/14/2019       Lab Results   Component Value Date    URICACID 16.4 (H) 11/15/2018         Assessment/Plan:     CKD (chronic kidney disease) stage 4, GFR 15-29 ml/min    1. BROOKE on CKD stage 3/4 :  Acute kidney injury on chronic kidney disease likely due to dehydration, continue IV fluids, monitor fluid status closely due to history of congestive heart failure, follow renal function closely, baseline creatinine about 1.5-2 , admission  creatinine is 3 mg/dL,    2.  Hyperkalemia - due to acute kidney injury, discontinue Ace inhibitors, will give a dose of fludrocortisone, continue hydration, expect potassium levels to improve with improvement in kidney function    3.  Metabolic acidosis -  on sodium bicarbonate supplements,    4.  Hyponatremia - acute on chronic, worsening in serum sodium levels due to acute kidney injury and dehydration, continue IV fluids and monitor serum sodium levels closely,    5.  Anemia of chronic kidney disease - stable hemoglobin, PRBC transfusion when indicated,    6.  Possible ileostomy reversal when clinically stable             Thank you for your consult. I will follow-up with patient. Please contact us if you have any additional questions.     Total time spent 70 minutes including time needed to review the records,  patient  evaluation, documentation, face-to-face discussion with the patient, spouse, primary team, more than 50% of the time was spent on coordination of care and counseling.       Hernandez Martinez MD   Nephrology  [facility] [place]

## 2019-01-15 NOTE — HPI
Patient is status post colostomy reversal.  He had a protective ileostomy done due to a leak. A Gastrografin enema showed no evidence of a leak. The drain fell out and was left out.    The patient has had issues with ileostomy output.  The ileostomy output ranges from 500-700 and occasionally 1400 mL a day.  This has created issues with renal failure and hyponatremia.  The patient is admitted now for correction of his hyponatremia and renal failure.  We will check his nutritional status.  Consideration will be given to ileostomy reversal during this admission.  Will ask Cardiology to see him to re-evaluate his cardiac risk says he has been at an increased risk for surgical procedures in the past

## 2019-01-15 NOTE — SUBJECTIVE & OBJECTIVE
"No current facility-administered medications on file prior to encounter.      Current Outpatient Medications on File Prior to Encounter   Medication Sig    allopurinol (ZYLOPRIM) 100 MG tablet TAKE 1 TABLET (100 MG TOTAL) BY MOUTH ONCE DAILY. (Patient taking differently: TAKE 1 TABLET (100 MG TOTAL) BY MOUTH ONCE DAILY./ takes in AM)    aspirin (ECOTRIN) 81 MG EC tablet Take 81 mg by mouth every morning.     BD INSULIN SYRINGE ULTRA-FINE 0.5 mL 31 gauge x 5/16 Syrg USE AS DIRECTED TO INJECT INSULIN TWO TIMES DAILY    bimatoprost (LUMIGAN) 0.03 % ophthalmic drops Place 1 drop into the left eye every evening. (Patient taking differently: Place 1 drop into the right eye every evening. )    blood sugar diagnostic Strp 1 strip by Misc.(Non-Drug; Combo Route) route 4 (four) times daily. [employer]    bumetanide (BUMEX) 2 MG tablet Take 1 tablet (2 mg total) by mouth 2 (two) times daily. (Patient taking differently: Take 2 mg by mouth every morning. )    carvedilol (COREG) 25 MG tablet TAKE 1/2 TABLET BY MOUTH TWICE A DAY WITH MEALS    cholecalciferol, vitamin D3, (VITAMIN D3) 1,000 unit capsule Take 1,000 Units by mouth once daily.    HYDROcodone-acetaminophen (NORCO) 5-325 mg per tablet One or 2 every 4-6 hours as needed for pain    insulin glargine (LANTUS) 100 unit/mL injection 50 units bid prn when BS< 150    IRON/VITAMIN B COMPLEX (GERITOL ORAL) Take by mouth.    lancets Misc For tel care    lisinopril (PRINIVIL,ZESTRIL) 5 MG tablet TAKE 1 TABLET (5 MG TOTAL) BY MOUTH ONCE DAILY. (Patient taking differently: Take 5 mg by mouth every morning. )    pantoprazole (PROTONIX) 40 MG tablet TAKE 1 TABLET BY MOUTH EVERY DAY FOR ACID REFLUX    pen needle, diabetic (BD ULTRA-FINE JOSLYN PEN NEEDLE) 32 gauge x 5/32" Ndle 1 each by Misc.(Non-Drug; Combo Route) route 4 (four) times daily.    simvastatin (ZOCOR) 10 MG tablet TAKE 1 TABLET (10 MG TOTAL) BY MOUTH EVERY EVENING.    sodium bicarbonate 650 MG tablet Take 3 " tablets (1,950 mg total) by mouth 3 (three) times daily.       Review of patient's allergies indicates:  No Known Allergies    Past Medical History:   Diagnosis Date    Arthritis     CAD (coronary artery disease)     Cataract     CHF (congestive heart failure)     Dr Calvillo    Chronic combined systolic and diastolic congestive heart failure 3/24/2015    CKD (chronic kidney disease), stage III     Diabetes mellitus type II      AM    Diabetic retinopathy     anti Veg F injections for macular edema    Diverticulitis of colon     ED (erectile dysfunction)     Glaucoma     HTN (hypertension)     Hyperlipidemia     Ischemic cardiomyopathy     Obesity     JAHAIRA on CPAP     Pacemaker     Pulmonary HTN      Past Surgical History:   Procedure Laterality Date    CARDIAC CATHETERIZATION  2009    CHOLECYSTECTOMY      COLECTOMY-SIGMOID N/A 4/22/2016    [facility]    COLONOSCOPY N/A 10/11/2018    [facility] ENDO    COLOSTOMY N/A 4/22/2016    [facility] OR    CREATION, ILEOSTOMY N/A 10/30/2018    [facility] at [facility] OR    EYE SURGERY      HEMICOLECTOMY, RIGHT Right 10/30/2018    [facility] OR    KNEE SURGERY      MOBILIZATION, SPLENIC FLEXURE N/A 10/30/2018    [facility] at [facility]    REVISION OR CLOSURE, COLOSTOMY N/A 10/30/2018    Performed by [facility] at [facility] OR     Family History     Problem Relation (Age of Onset)    Cancer Mother    Heart disease Father    No Known Problems Sister, Brother, Maternal Aunt, Maternal Uncle, Paternal Aunt, Paternal Uncle, Maternal Grandmother, Maternal Grandfather, Paternal Grandmother, Paternal Grandfather    Stroke Father        Tobacco Use    Smoking status: Never Smoker    Smokeless tobacco: Never Used   Substance and Sexual Activity    Alcohol use: No     Alcohol/week: 0.0  oz     Frequency: Never    Drug use: No    Sexual activity: Not Currently     Review of Systems   Constitutional: Positive for fatigue.   HENT: Negative.    Eyes: Negative.    Respiratory: Negative.    Cardiovascular: Negative.    Gastrointestinal: Negative.         Ileostomy with leakage issue   Endocrine: Negative.    Genitourinary: Negative.    Musculoskeletal: Negative.    Skin: Negative.    Allergic/Immunologic: Negative.    Neurological: Negative.    Hematological: Negative.    Psychiatric/Behavioral: Negative.      Objective:     Vital Signs (Most Recent):  Temp: 97.8 °F (36.6 °C) (01/15/19 1142)  Pulse: 93 (01/15/19 1142)  Resp: 18 (01/15/19 1142)  BP: (!) 99/59 (01/15/19 1142)  SpO2: 99 % (01/15/19 1142) Vital Signs (24h Range):  Temp:  [97.1 °F (36.2 °C)-98.6 °F (37 °C)] 97.8 °F (36.6 °C)  Pulse:  [93-96] 93  Resp:  [18] 18  SpO2:  [98 %-99 %] 99 %  BP: ()/(59-62) 99/59        There is no height or weight on file to calculate BMI.    Physical Exam   Constitutional: He appears well-developed and well-nourished.   Chronically ill   Neck: Normal range of motion. Neck supple.   Cardiovascular: Normal rate and regular rhythm.   Pulmonary/Chest: Effort normal and breath sounds normal.   Abdominal: Soft. Bowel sounds are normal.   Well-healed midline incision. Lower right-sided ileostomy with skin irritation   Musculoskeletal: Normal range of motion.   Neurological: He is alert.   Skin: Skin is warm. Capillary refill takes less than 2 seconds.   Skin irritation below and lateral to the ileostomy   Psychiatric: He has a normal mood and affect. His behavior is normal. Thought content normal.   Vitals reviewed.      Significant Labs:  CBC: No results for input(s): WBC, RBC, HGB, HCT, PLT, MCV, MCH, MCHC in the last 168 hours.  BMP:   Recent Labs   Lab 01/14/19  1440   *  167*   *  125*   K 4.8  4.8   CL 92*  92*   CO2 25  25   BUN 59*  59*   CREATININE 3.6*  3.6*   CALCIUM 10.0  10.0      CMP:   Recent Labs   Lab 01/14/19  1440   *  167*   CALCIUM 10.0  10.0   ALBUMIN 3.2*  3.2*   PROT 7.8   *  125*   K 4.8  4.8   CO2 25  25   CL 92*  92*   BUN 59*  59*   CREATININE 3.6*  3.6*   ALKPHOS 104   ALT 36   AST 33   BILITOT 0.5       Significant Diagnostics:  None

## 2019-01-15 NOTE — HPI
Yan Choudhury is a 62 y/o AA man with history of hypertension, congestive heart failure, cardiomyopathy, chronic kidney disease stage 3, baseline creatinine about 1.5-2, chronic and mild hyponatremia, he had been having complications from his ileostomy, increased ileostomy outputs, was admitted to the hospital for abnormal labs including hyponatremia and worsening renal function, serum creatinine increased to 3.5 mg/dL and sodium worsened to 125, patient admitted for gentle hydration and once clinically stable to Plan reversal of his ileostomy, he has history of combined systolic and diastolic heart failure with ejection fraction of about 30%, mild hyperkalemia noted on his labs likely due to Ace inhibitors he was on prior to admission and also acute on chronic kidney failure.

## 2019-01-15 NOTE — ASSESSMENT & PLAN NOTE
This is likely contributing to his electrolytes fluid abnormalities.  Will consider ileostomy reversal this admission.    Wound care consult for ileostomy care

## 2019-01-15 NOTE — H&P (VIEW-ONLY)
[facility] -   General Surgery  History & Physical    Patient Name: Yan Choudhury  MRN: [id number]  Admission Date: 1/15/2019  Attending Physician: Kevin Lindsay MD   Primary Care Provider: Sandra Estevez MD    Patient information was obtained from patient, spouse/SO and past medical records.     Subjective:     Chief Complaint/Reason for Admission:  Acute on chronic renal failure, hyponatremia, ileostomy, diabetes, cardiomyopathy    History of Present Illness: Patient is status post colostomy reversal.  He had a protective ileostomy done due to a leak. A Gastrografin enema showed no evidence of a leak. The drain fell out and was left out.    The patient has had issues with ileostomy output.  The ileostomy output ranges from 500-700 and occasionally 1400 mL a day.  This has created issues with renal failure and hyponatremia.  The patient is admitted now for correction of his hyponatremia and renal failure.  We will check his nutritional status.  Consideration will be given to ileostomy reversal during this admission.  Will ask Cardiology to see him to re-evaluate his cardiac risk says he has been at an increased risk for surgical procedures in the past    No current facility-administered medications on file prior to encounter.      Current Outpatient Medications on File Prior to Encounter   Medication Sig    allopurinol (ZYLOPRIM) 100 MG tablet TAKE 1 TABLET (100 MG TOTAL) BY MOUTH ONCE DAILY. (Patient taking differently: TAKE 1 TABLET (100 MG TOTAL) BY MOUTH ONCE DAILY./ takes in AM)    aspirin (ECOTRIN) 81 MG EC tablet Take 81 mg by mouth every morning.     BD INSULIN SYRINGE ULTRA-FINE 0.5 mL 31 gauge x 5/16 Syrg USE AS DIRECTED TO INJECT INSULIN TWO TIMES DAILY    bimatoprost (LUMIGAN) 0.03 % ophthalmic drops Place 1 drop into the left eye every evening. (Patient taking differently: Place 1 drop into the right eye every evening. )    blood sugar diagnostic Strp 1 strip by Misc.(Non-Drug; Combo  "Route) route 4 (four) times daily. [facility]    bumetanide (BUMEX) 2 MG tablet Take 1 tablet (2 mg total) by mouth 2 (two) times daily. (Patient taking differently: Take 2 mg by mouth every morning. )    carvedilol (COREG) 25 MG tablet TAKE 1/2 TABLET BY MOUTH TWICE A DAY WITH MEALS    cholecalciferol, vitamin D3, (VITAMIN D3) 1,000 unit capsule Take 1,000 Units by mouth once daily.    HYDROcodone-acetaminophen (NORCO) 5-325 mg per tablet One or 2 every 4-6 hours as needed for pain    insulin glargine (LANTUS) 100 unit/mL injection 50 units bid prn when BS< 150    IRON/VITAMIN B COMPLEX (GERITOL ORAL) Take by mouth.    lancets Misc For tel care    lisinopril (PRINIVIL,ZESTRIL) 5 MG tablet TAKE 1 TABLET (5 MG TOTAL) BY MOUTH ONCE DAILY. (Patient taking differently: Take 5 mg by mouth every morning. )    pantoprazole (PROTONIX) 40 MG tablet TAKE 1 TABLET BY MOUTH EVERY DAY FOR ACID REFLUX    pen needle, diabetic (BD ULTRA-FINE JOSLYN PEN NEEDLE) 32 gauge x 5/32" Ndle 1 each by Misc.(Non-Drug; Combo Route) route 4 (four) times daily.    simvastatin (ZOCOR) 10 MG tablet TAKE 1 TABLET (10 MG TOTAL) BY MOUTH EVERY EVENING.    sodium bicarbonate 650 MG tablet Take 3 tablets (1,950 mg total) by mouth 3 (three) times daily.       Review of patient's allergies indicates:  No Known Allergies    Past Medical History:   Diagnosis Date    Arthritis     CAD (coronary artery disease)     Cataract     CHF (congestive heart failure)     Dr Calvillo    Chronic combined systolic and diastolic congestive heart failure 3/24/2015    CKD (chronic kidney disease), stage III     Diabetes mellitus type II      AM    Diabetic retinopathy     anti Veg F injections for macular edema    Diverticulitis of colon     ED (erectile dysfunction)     Glaucoma     HTN (hypertension)     Hyperlipidemia     Ischemic cardiomyopathy     Obesity     JAHAIRA on CPAP     Pacemaker     Pulmonary HTN      Past Surgical History: "   Procedure Laterality Date    CARDIAC CATHETERIZATION  2009    CHOLECYSTECTOMY      COLECTOMY-SIGMOID N/A 4/22/2016    [facility]    COLONOSCOPY N/A 10/11/2018    [facility] ENDO    COLOSTOMY N/A 4/22/2016    [facility]    CREATION, ILEOSTOMY N/A 10/30/2018    [facility] OR    EYE SURGERY      HEMICOLECTOMY, RIGHT Right 10/30/2018    [facility]    KNEE SURGERY      MOBILIZATION, SPLENIC FLEXURE N/A 10/30/2018    [facility] OR    REVISION OR CLOSURE, COLOSTOMY N/A 10/30/2018    [facility] OR     Family History     Problem Relation (Age of Onset)    Cancer Mother    Heart disease Father    No Known Problems Sister, Brother, Maternal Aunt, Maternal Uncle, Paternal Aunt, Paternal Uncle, Maternal Grandmother, Maternal Grandfather, Paternal Grandmother, Paternal Grandfather    Stroke Father        Tobacco Use    Smoking status: Never Smoker    Smokeless tobacco: Never Used   Substance and Sexual Activity    Alcohol use: No     Alcohol/week: 0.0 oz     Frequency: Never    Drug use: No    Sexual activity: Not Currently     Review of Systems   Constitutional: Positive for fatigue.   HENT: Negative.    Eyes: Negative.    Respiratory: Negative.    Cardiovascular: Negative.    Gastrointestinal: Negative.         Ileostomy with leakage issue   Endocrine: Negative.    Genitourinary: Negative.    Musculoskeletal: Negative.    Skin: Negative.    Allergic/Immunologic: Negative.    Neurological: Negative.    Hematological: Negative.    Psychiatric/Behavioral: Negative.      Objective:     Vital Signs (Most Recent):  Temp: 97.8 °F (36.6 °C) (01/15/19 1142)  Pulse: 93 (01/15/19 1142)  Resp: 18 (01/15/19 1142)  BP: (!) 99/59 (01/15/19 1142)  SpO2: 99 % (01/15/19 1142) Vital Signs (24h  Range):  Temp:  [97.1 °F (36.2 °C)-98.6 °F (37 °C)] 97.8 °F (36.6 °C)  Pulse:  [93-96] 93  Resp:  [18] 18  SpO2:  [98 %-99 %] 99 %  BP: ()/(59-62) 99/59        There is no height or weight on file to calculate BMI.    Physical Exam   Constitutional: He appears well-developed and well-nourished.   Chronically ill   Neck: Normal range of motion. Neck supple.   Cardiovascular: Normal rate and regular rhythm.   Pulmonary/Chest: Effort normal and breath sounds normal.   Abdominal: Soft. Bowel sounds are normal.   Well-healed midline incision. Lower right-sided ileostomy with skin irritation   Musculoskeletal: Normal range of motion.   Neurological: He is alert.   Skin: Skin is warm. Capillary refill takes less than 2 seconds.   Skin irritation below and lateral to the ileostomy   Psychiatric: He has a normal mood and affect. His behavior is normal. Thought content normal.   Vitals reviewed.      Significant Labs:  CBC: No results for input(s): WBC, RBC, HGB, HCT, PLT, MCV, MCH, MCHC in the last 168 hours.  BMP:   Recent Labs   Lab 01/14/19  1440   *  167*   *  125*   K 4.8  4.8   CL 92*  92*   CO2 25  25   BUN 59*  59*   CREATININE 3.6*  3.6*   CALCIUM 10.0  10.0     CMP:   Recent Labs   Lab 01/14/19  1440   *  167*   CALCIUM 10.0  10.0   ALBUMIN 3.2*  3.2*   PROT 7.8   *  125*   K 4.8  4.8   CO2 25  25   CL 92*  92*   BUN 59*  59*   CREATININE 3.6*  3.6*   ALKPHOS 104   ALT 36   AST 33   BILITOT 0.5       Significant Diagnostics:  None    Assessment/Plan:     Hyperkalemia    Monitor     Hyponatremia    Normal saline at 100 mL an hour for the last Nephrology to see consider the use of hypertonic saline     Alteration in skin integrity    Irritated skin around the ileostomy, wound care     Acute renal failure    Normal saline at 100 mL an hour  Nephrology consultation for consideration of hypertonic saline     Gastrointestinal anastomotic leak    Gastrografin enema shows  that this has resolved.  Plan sigmoidoscopy later this week     Acute on chronic renal failure    Nephrology consultation normal saline at 100 mL an hour     Ileostomy in place    This is likely contributing to his electrolytes fluid abnormalities.  Will consider ileostomy reversal this admission.    Wound care consult for ileostomy care     Chronic systolic heart failure    Monitor     Biventricular ICD (implantable cardioverter-defibrillator) in place    Monitor with telemetry     CAD, multiple vessel    As cardiology re-evaluate the patient and comment on his risk for ileostomy reversal, from a cardiac point     Ischemic cardiomyopathy    Monitor, cardiac evaluation.  The for echocardiogram to be repeated per Cardiology.  Will check a BNP       VTE Risk Mitigation (From admission, onward)        Ordered     IP VTE LOW RISK PATIENT  Once      01/15/19 1115     Place sequential compression device  Until discontinued      01/15/19 1115          Kevin Lindsay MD  General Surgery  [facility] -

## 2019-01-16 LAB
ANION GAP SERPL CALC-SCNC: 10 MMOL/L
ANION GAP SERPL CALC-SCNC: 8 MMOL/L
BNP SERPL-MCNC: 147 PG/ML
BUN SERPL-MCNC: 42 MG/DL
BUN SERPL-MCNC: 46 MG/DL
BUN SERPL-MCNC: 50 MG/DL
BUN SERPL-MCNC: 53 MG/DL
CALCIUM SERPL-MCNC: 9.4 MG/DL
CALCIUM SERPL-MCNC: 9.7 MG/DL
CALCIUM SERPL-MCNC: 9.8 MG/DL
CALCIUM SERPL-MCNC: 9.9 MG/DL
CHLORIDE SERPL-SCNC: 95 MMOL/L
CHLORIDE SERPL-SCNC: 96 MMOL/L
CHLORIDE SERPL-SCNC: 97 MMOL/L
CHLORIDE SERPL-SCNC: 99 MMOL/L
CO2 SERPL-SCNC: 22 MMOL/L
CO2 SERPL-SCNC: 22 MMOL/L
CO2 SERPL-SCNC: 23 MMOL/L
CO2 SERPL-SCNC: 23 MMOL/L
CREAT SERPL-MCNC: 2.2 MG/DL
CREAT SERPL-MCNC: 2.5 MG/DL
CREAT SERPL-MCNC: 2.8 MG/DL
CREAT SERPL-MCNC: 2.9 MG/DL
EST. GFR  (AFRICAN AMERICAN): 25 ML/MIN/1.73 M^2
EST. GFR  (AFRICAN AMERICAN): 26 ML/MIN/1.73 M^2
EST. GFR  (AFRICAN AMERICAN): 30 ML/MIN/1.73 M^2
EST. GFR  (AFRICAN AMERICAN): 35 ML/MIN/1.73 M^2
EST. GFR  (NON AFRICAN AMERICAN): 22 ML/MIN/1.73 M^2
EST. GFR  (NON AFRICAN AMERICAN): 23 ML/MIN/1.73 M^2
EST. GFR  (NON AFRICAN AMERICAN): 26 ML/MIN/1.73 M^2
EST. GFR  (NON AFRICAN AMERICAN): 30 ML/MIN/1.73 M^2
GLUCOSE SERPL-MCNC: 107 MG/DL
GLUCOSE SERPL-MCNC: 119 MG/DL
GLUCOSE SERPL-MCNC: 129 MG/DL
GLUCOSE SERPL-MCNC: 139 MG/DL
POCT GLUCOSE: 124 MG/DL (ref 70–110)
POCT GLUCOSE: 143 MG/DL (ref 70–110)
POCT GLUCOSE: 144 MG/DL (ref 70–110)
POCT GLUCOSE: 161 MG/DL (ref 70–110)
POTASSIUM SERPL-SCNC: 4.7 MMOL/L
POTASSIUM SERPL-SCNC: 4.8 MMOL/L
PREALB SERPL-MCNC: 25 MG/DL
SODIUM SERPL-SCNC: 127 MMOL/L
SODIUM SERPL-SCNC: 129 MMOL/L
SODIUM SERPL-SCNC: 129 MMOL/L
SODIUM SERPL-SCNC: 130 MMOL/L
SODIUM UR-SCNC: <20 MMOL/L
URATE SERPL-MCNC: 8.7 MG/DL

## 2019-01-16 PROCEDURE — 80048 BASIC METABOLIC PNL TOTAL CA: CPT

## 2019-01-16 PROCEDURE — 25000003 PHARM REV CODE 250: Performed by: INTERNAL MEDICINE

## 2019-01-16 PROCEDURE — 83880 ASSAY OF NATRIURETIC PEPTIDE: CPT

## 2019-01-16 PROCEDURE — 84134 ASSAY OF PREALBUMIN: CPT

## 2019-01-16 PROCEDURE — 99233 SBSQ HOSP IP/OBS HIGH 50: CPT | Mod: ,,, | Performed by: INTERNAL MEDICINE

## 2019-01-16 PROCEDURE — 11000001 HC ACUTE MED/SURG PRIVATE ROOM

## 2019-01-16 PROCEDURE — 99233 PR SUBSEQUENT HOSPITAL CARE,LEVL III: ICD-10-PCS | Mod: ,,, | Performed by: INTERNAL MEDICINE

## 2019-01-16 PROCEDURE — 25000003 PHARM REV CODE 250: Performed by: SURGERY

## 2019-01-16 PROCEDURE — 80048 BASIC METABOLIC PNL TOTAL CA: CPT | Mod: 91

## 2019-01-16 PROCEDURE — 21400001 HC TELEMETRY ROOM

## 2019-01-16 PROCEDURE — 84300 ASSAY OF URINE SODIUM: CPT

## 2019-01-16 PROCEDURE — 84550 ASSAY OF BLOOD/URIC ACID: CPT

## 2019-01-16 PROCEDURE — 36415 COLL VENOUS BLD VENIPUNCTURE: CPT

## 2019-01-16 PROCEDURE — A4216 STERILE WATER/SALINE, 10 ML: HCPCS | Performed by: SURGERY

## 2019-01-16 RX ADMIN — BIMATOPROST 1 DROP: 0.1 SOLUTION/ DROPS OPHTHALMIC at 09:01

## 2019-01-16 RX ADMIN — SODIUM BICARBONATE 650 MG TABLET 650 MG: at 09:01

## 2019-01-16 RX ADMIN — ALLOPURINOL 50 MG: 300 TABLET ORAL at 09:01

## 2019-01-16 RX ADMIN — Medication 10 ML: at 11:01

## 2019-01-16 RX ADMIN — SIMVASTATIN 10 MG: 5 TABLET, FILM COATED ORAL at 09:01

## 2019-01-16 RX ADMIN — SODIUM BICARBONATE 650 MG TABLET 650 MG: at 02:01

## 2019-01-16 RX ADMIN — Medication 10 ML: at 09:01

## 2019-01-16 RX ADMIN — SODIUM CHLORIDE: 0.9 INJECTION, SOLUTION INTRAVENOUS at 11:01

## 2019-01-16 NOTE — PLAN OF CARE
"CM met with patient at the bedside to assess for discharge needs.  Patient lives at home with his wife and is independent with needs.  He states that he had to return to the hospital because of the "same thing."  He states that his ileostomy puts out a lot which is causing the problem.  He is current with [facility] and would like services resumed at discharge.  Choice form completed and placed in patient blue folder.   CM provided  information on advanced directives, information on pharmacy bedside delivery, and discharge planning begins on admission with contact information for any needs/questions.    D/C Plan:  Home with [facility]    [id number] - [place], [place] - [address] AT AT [facility]  [address]  [place] [address]  Phone: [phone] Fax: [phone]    Sandra Estevez MD  Payor: MEDICARE / Plan: MEDICARE PART A & B / Product Type: [facility] /       01/16/19 0838   Discharge Assessment   Assessment Type Discharge Planning Assessment   Confirmed/corrected address and phone number on facesheet? Yes   Assessment information obtained from? Patient;Medical Record   Expected Length of Stay (days) (TBD)   Communicated expected length of stay with patient/caregiver yes   Prior to hospitilization cognitive status: Alert/Oriented   Prior to hospitalization functional status: Independent;Assistive Equipment   Current cognitive status: Alert/Oriented   Current Functional Status: Independent;Assistive Equipment   Facility Arrived From: home   Lives With spouse   Able to Return to Prior Arrangements yes   Is patient able to care for self after discharge? Yes   Who are your caregiver(s) and their phone number(s)? Jessica , spouse [phone]   Patient's perception of discharge disposition home health   Readmission Within the Last 30 Days previous discharge plan unsuccessful   If yes, most recent facility name: [facility]   Patient currently being followed by " "outpatient case management? No   Patient currently receives any other outside agency services? No   Equipment Currently Used at Home glucometer;shower chair;CPAP;cane, straight   Do you have any problems affording any of your prescribed medications? No   Is the patient taking medications as prescribed? yes   Does the patient have transportation home? Yes   Transportation Anticipated family or friend will provide   Dialysis Name and Scheduled days NA   Does the patient receive services at the [facility]madin Clinic? No   Discharge Plan A Home Health   DME Needed Upon Discharge  none   Patient/Family in Agreement with Plan yes   Readmission Questionnaire   At the time of your discharge, did someone talk to you about what your health problems were? Yes   At the time of discharge, did someone talk to you about what to watch out for regarding worsening of your health problem? Yes   At the time of discharge, did someone talk to you about what to do if you experienced worsening of your health problem? Yes   At the time of discharge, did someone talk to you about which medication to take when you left the hospital and which ones to stop taking? Yes   At the time of discharge, did someone talk to you about when and where to follow up with a doctor after you left the hospital? Yes   What do you believe caused you to be sick enough to be re-admitted? "same thing"   How often do you need to have someone help you when you read instructions, pamphlets, or other written material from your doctor or pharmacy? Rarely   Do you have problems taking your medications as prescribed? No   Do you have any problems affording any of  your prescribed medications? No   Do you have problems obtaining/receiving your medications? No   Does the patient have transportation to healthcare appointments? Yes   Living Arrangements house   Does the patient have family/friends to help with healtcare needs after discharge? yes   Does your caregiver provide all " the help you need? Yes   Are you currently feeling confused? No   Are you currently having problems thinking? No   Are you currently having memory problems? No   Have you felt down, depressed, or hopeless? 0   Have you felt little interest or pleasure in doing things? 0   In the last 7 days, my sleep quality was: good

## 2019-01-16 NOTE — PLAN OF CARE
Problem: Adult Inpatient Plan of Care  Goal: Plan of Care Review  Outcome: Ongoing (interventions implemented as appropriate)  We we not able to obtain IV access. Patient refused attempt from ER nurse who came up during dinner. Will call ER to attempt again with ultrasound machine. Patients ostomy leaked several times. I changed the bag 5 times during the shift. The powder seems to interfere with creating a good seal. Skin barrier applied. Blood glucose monitoring initiated. Pt denies pain. No signs of acute distress. Chart check complete. Will continue to monitor.

## 2019-01-16 NOTE — PLAN OF CARE
Problem: Adult Inpatient Plan of Care  Goal: Plan of Care Review  Outcome: Ongoing (interventions implemented as appropriate)  Plan of care reviewed and discussed with patient.  No acute distress noted. Safety and fall precautions in place.  Patient free from injury.  Oral hydration and ambulation promoted.  Ileostomy care given.  IV hydration maintained.  Denies pain.  Will continue to monitor.    12 hour chart check complete.

## 2019-01-16 NOTE — PROGRESS NOTES
Follow up visit with Mr. Choudhury. Issues with Ileostomy leaking overnight. Assessed and patient states he changed pouch himself again this morning, it is currently leaking. Appliance removed and cleansed site with warm water and gauze. Patted dry.  Crusted with stoma powder and Cavilon spay.  Due to frequent leakage, Convex pouch applied with Eakins paste. Patient tolerated well. Left extra convex pouch at bedside. Primary nurse KRISTIN Garcia updated on care provided.

## 2019-01-16 NOTE — PROGRESS NOTES
[facility] -   Nephrology  Progress Note    Patient Name: Yan Choudhury  MRN: [id number]  Admission Date: 1/15/2019  Hospital Length of Stay: 1 days  Attending Provider: Kevin Lindsay MD   Primary Care Physician: Sandra Estevez MD  Principal Problem:Hyponatremia    Subjective:     HPI: aYn Choudhury is a 64 y/o AA man with history of hypertension, congestive heart failure, cardiomyopathy, chronic kidney disease stage 3, baseline creatinine about 1.5-2, chronic and mild hyponatremia, he had been having complications from his ileostomy, increased ileostomy outputs, was admitted to the hospital for abnormal labs including hyponatremia and worsening renal function, serum creatinine increased to 3.5 mg/dL and sodium worsened to 125, patient admitted for gentle hydration and once clinically stable to Plan reversal of his ileostomy, he has history of combined systolic and diastolic heart failure with ejection fraction of about 30%, mild hyperkalemia noted on his labs likely due to Ace inhibitors he was on prior to admission and also acute on chronic kidney failure.      Interval History: no acute events, s/p PICC in right arm ( 01/15/19 )     Review of patient's allergies indicates:No Known Allergies      Current Facility-Administered Medications   Medication Frequency    0.9%  NaCl infusion Continuous    acetaminophen tablet 650 mg Q8H PRN    allopurinol split tablet 50 mg Daily    bimatoprost 0.01 % ophthalmic drops 1 drop QHS    dextrose 50% injection 12.5 g PRN    dextrose 50% injection 25 g PRN    glucagon (human recombinant) injection 1 mg PRN    glucose chewable tablet 16 g PRN    glucose chewable tablet 24 g PRN    HYDROcodone-acetaminophen 5-325 mg per tablet 1 tablet Q6H PRN    insulin aspart U-100 pen 1-10 Units QID (AC + HS) PRN    lidocaine (PF) 10 mg/ml (1%) injection 10 mg Once    ondansetron disintegrating tablet 8 mg Q8H PRN    promethazine (PHENERGAN) 6.25 mg in dextrose 5 % 50 mL  IVPB Q6H PRN    ramelteon tablet 8 mg Nightly PRN    simvastatin tablet 10 mg QHS    sodium bicarbonate tablet 650 mg TID    sodium chloride 0.9% flush 10 mL Q6H    And    sodium chloride 0.9% flush 10 mL PRN       Objective:     Vital Signs (Most Recent):  Temp: 97.5 °F (36.4 °C) (01/16/19 1235)  Pulse: 95 (01/16/19 1310)  Resp: 18 (01/16/19 1235)  BP: 105/65 (01/16/19 1235)  SpO2: 97 % (01/16/19 1235)  O2 Device (Oxygen Therapy): room air (01/16/19 1235) Vital Signs (24h Range):  Temp:  [97.5 °F (36.4 °C)-99 °F (37.2 °C)] 97.5 °F (36.4 °C)  Pulse:  [] 95  Resp:  [17-20] 18  SpO2:  [96 %-98 %] 97 %  BP: ()/(57-68) 105/65     Weight: 77.9 kg (171 lb 11.8 oz) (01/15/19 1142)  Body mass index is 26.11 kg/m².  Body surface area is 1.93 meters squared.    I/O last 3 completed shifts:  In: 480 [P.O.:480]  Out: 650 [Urine:350; Stool:300]    Physical Exam   Constitutional: He is oriented to person, place, and time. He appears well-developed. No distress.   HENT:   Head: Normocephalic and atraumatic.   Eyes: Pupils are equal, round, and reactive to light.   Neck: Normal range of motion. Neck supple. No tracheal deviation present. No thyromegaly present.   Cardiovascular: Normal rate, regular rhythm and intact distal pulses. Exam reveals no gallop and no friction rub.   Murmur heard.  Pulmonary/Chest: Breath sounds normal. He has no wheezes. He has no rales.   Abdominal: Soft. He exhibits no mass. There is no tenderness. There is no rebound and no guarding.   Ileostomy bag in place    Musculoskeletal: Normal range of motion. He exhibits no edema.   Lymphadenopathy:     He has no cervical adenopathy.   Neurological: He is alert and oriented to person, place, and time.   Skin: Skin is warm. No rash noted. He is not diaphoretic. No erythema.   Nursing note and vitals reviewed.      Significant Labs:  CBC: No results for input(s): WBC, RBC, HGB, HCT, PLT, MCV, MCH, MCHC in the last 168 hours.  CMP:   Recent Labs    Lab 01/14/19  1440  01/16/19  1202   *  167*   < > 139*   CALCIUM 10.0  10.0   < > 9.9   ALBUMIN 3.2*  3.2*  --   --    PROT 7.8  --   --    *  125*   < > 129*   K 4.8  4.8   < > 4.8   CO2 25  25   < > 22*   CL 92*  92*   < > 97   BUN 59*  59*   < > 46*   CREATININE 3.6*  3.6*   < > 2.5*   ALKPHOS 104  --   --    ALT 36  --   --    AST 33  --   --    BILITOT 0.5  --   --     < > = values in this interval not displayed.     Coagulation: No results for input(s): PT, INR, APTT in the last 168 hours.  LFTs:   Recent Labs   Lab 01/14/19  1440   ALT 36   AST 33   ALKPHOS 104   BILITOT 0.5   PROT 7.8   ALBUMIN 3.2*  3.2*     All labs within the past 24 hours have been reviewed.     Significant Imaging:  Reviewed    Lab Results   Component Value Date    URICACID 8.7 (H) 01/16/2019         Assessment/Plan:     CKD (chronic kidney disease) stage 4, GFR 15-29 ml/min    1. 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  Hyperkalemia - improved     3.  Metabolic acidosis -  on sodium bicarbonate supplements,    4.  Hyponatremia - acute on chronic, worsening in serum sodium levels due to acute kidney injury and dehydration,     continue IV fluids and monitor serum sodium levels closely,    5.  Anemia of chronic kidney disease - stable hemoglobin, PRBC transfusion when indicated,    6.  Possible ileostomy reversal when clinically stable    7. CHF , LVEF about 30 % , currently on IV fluids due to dehydration ,               I will follow-up with patient. Please contact us if you have any additional questions.     Total time spent 40 minutes including time needed to review the records,  patient  evaluation, documentation, face-to-face discussion with the patient,  spouse, primary team, more than 50% of the time was spent on coordination of care and counseling.       Hernandez Martinez MD  Nephrology  [facility]

## 2019-01-16 NOTE — SUBJECTIVE & OBJECTIVE
Interval History: no acute events, s/p PICC in right arm ( 01/15/19 )     Review of patient's allergies indicates:No Known Allergies      Current Facility-Administered Medications   Medication Frequency    0.9%  NaCl infusion Continuous    acetaminophen tablet 650 mg Q8H PRN    allopurinol split tablet 50 mg Daily    bimatoprost 0.01 % ophthalmic drops 1 drop QHS    dextrose 50% injection 12.5 g PRN    dextrose 50% injection 25 g PRN    glucagon (human recombinant) injection 1 mg PRN    glucose chewable tablet 16 g PRN    glucose chewable tablet 24 g PRN    HYDROcodone-acetaminophen 5-325 mg per tablet 1 tablet Q6H PRN    insulin aspart U-100 pen 1-10 Units QID (AC + HS) PRN    lidocaine (PF) 10 mg/ml (1%) injection 10 mg Once    ondansetron disintegrating tablet 8 mg Q8H PRN    promethazine (PHENERGAN) 6.25 mg in dextrose 5 % 50 mL IVPB Q6H PRN    ramelteon tablet 8 mg Nightly PRN    simvastatin tablet 10 mg QHS    sodium bicarbonate tablet 650 mg TID    sodium chloride 0.9% flush 10 mL Q6H    And    sodium chloride 0.9% flush 10 mL PRN       Objective:     Vital Signs (Most Recent):  Temp: 97.5 °F (36.4 °C) (01/16/19 1235)  Pulse: 95 (01/16/19 1310)  Resp: 18 (01/16/19 1235)  BP: 105/65 (01/16/19 1235)  SpO2: 97 % (01/16/19 1235)  O2 Device (Oxygen Therapy): room air (01/16/19 1235) Vital Signs (24h Range):  Temp:  [97.5 °F (36.4 °C)-99 °F (37.2 °C)] 97.5 °F (36.4 °C)  Pulse:  [] 95  Resp:  [17-20] 18  SpO2:  [96 %-98 %] 97 %  BP: ()/(57-68) 105/65     Weight: 77.9 kg (171 lb 11.8 oz) (01/15/19 1142)  Body mass index is 26.11 kg/m².  Body surface area is 1.93 meters squared.    I/O last 3 completed shifts:  In: 480 [P.O.:480]  Out: 650 [Urine:350; Stool:300]    Physical Exam   Constitutional: He is oriented to person, place, and time. He appears well-developed. No distress.   HENT:   Head: Normocephalic and atraumatic.   Eyes: Pupils are equal, round, and reactive to light.   Neck:  Normal range of motion. Neck supple. No tracheal deviation present. No thyromegaly present.   Cardiovascular: Normal rate, regular rhythm and intact distal pulses. Exam reveals no gallop and no friction rub.   Murmur heard.  Pulmonary/Chest: Breath sounds normal. He has no wheezes. He has no rales.   Abdominal: Soft. He exhibits no mass. There is no tenderness. There is no rebound and no guarding.   Ileostomy bag in place    Musculoskeletal: Normal range of motion. He exhibits no edema.   Lymphadenopathy:     He has no cervical adenopathy.   Neurological: He is alert and oriented to person, place, and time.   Skin: Skin is warm. No rash noted. He is not diaphoretic. No erythema.   Nursing note and vitals reviewed.      Significant Labs:  CBC: No results for input(s): WBC, RBC, HGB, HCT, PLT, MCV, MCH, MCHC in the last 168 hours.  CMP:   Recent Labs   Lab 01/14/19  1440  01/16/19  1202   *  167*   < > 139*   CALCIUM 10.0  10.0   < > 9.9   ALBUMIN 3.2*  3.2*  --   --    PROT 7.8  --   --    *  125*   < > 129*   K 4.8  4.8   < > 4.8   CO2 25  25   < > 22*   CL 92*  92*   < > 97   BUN 59*  59*   < > 46*   CREATININE 3.6*  3.6*   < > 2.5*   ALKPHOS 104  --   --    ALT 36  --   --    AST 33  --   --    BILITOT 0.5  --   --     < > = values in this interval not displayed.     Coagulation: No results for input(s): PT, INR, APTT in the last 168 hours.  LFTs:   Recent Labs   Lab 01/14/19  1440   ALT 36   AST 33   ALKPHOS 104   BILITOT 0.5   PROT 7.8   ALBUMIN 3.2*  3.2*     All labs within the past 24 hours have been reviewed.     Significant Imaging:  Reviewed    Lab Results   Component Value Date    URICACID 8.7 (H) 01/16/2019

## 2019-01-16 NOTE — PROCEDURES
"Yan Choudhury is a 63 y.o. male patient.    Temp: 97.5 °F (36.4 °C) (01/15/19 1956)  Pulse: 97 (01/15/19 1956)  Resp: 18 (01/15/19 1956)  BP: 94/60 (01/15/19 1956)  SpO2: 98 % (01/15/19 1956)  Weight: 77.9 kg (171 lb 11.8 oz) (01/15/19 1142)  Height: 5' 8" (172.7 cm) (01/15/19 1142)    PICC  Date/Time: 1/15/2019 10:40 PM  Consent Done: Yes  Time out: Immediately prior to procedure a time out was called to verify the correct patient, procedure, equipment, support staff and site/side marked as required  Indications: med administration and vascular access  Anesthesia: local infiltration  Local anesthetic: lidocaine 1% without epinephrine  Anesthetic Total (mL): 2  Preparation: skin prepped with ChloraPrep  Skin prep agent dried: skin prep agent completely dried prior to procedure  Sterile barriers: all five maximum sterile barriers used - cap, mask, sterile gown, sterile gloves, and large sterile sheet  Hand hygiene: hand hygiene performed prior to central venous catheter insertion  Location details: right brachial  Catheter type: double lumen  Catheter size: 5 Fr  Catheter Length: 36cm    Ultrasound guidance: yes  Needle advanced into vessel with real time Ultrasound guidance.  Guidewire confirmed in vessel.  Sterile sheath used.  Number of attempts: 1  Post-procedure: blood return through all ports, chlorhexidine patch and sterile dressing applied  Specimens: No  Implants: No  Assessment: placement verified by x-ray  Complications: none          Terence Jose  1/15/2019    "

## 2019-01-16 NOTE — PLAN OF CARE
Problem: Adult Inpatient Plan of Care  Goal: Plan of Care Review  Outcome: Ongoing (interventions implemented as appropriate)  Patient remained free from injury. Blood glucose and cardiac monitored (NSR) . Ileoustomy to RUQ producing brown soft stool.Family at bedside. No s/s of acute distress. 12hr chart check complete.

## 2019-01-16 NOTE — ASSESSMENT & PLAN NOTE
1. 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  Hyperkalemia - improved     3.  Metabolic acidosis -  on sodium bicarbonate supplements,    4.  Hyponatremia - acute on chronic, worsening in serum sodium levels due to acute kidney injury and dehydration,     continue IV fluids and monitor serum sodium levels closely,    5.  Anemia of chronic kidney disease - stable hemoglobin, PRBC transfusion when indicated,    6.  Possible ileostomy reversal when clinically stable    7. CHF , LVEF about 30 % , currently on IV fluids due to dehydration ,

## 2019-01-16 NOTE — SUBJECTIVE & OBJECTIVE
Interval History:  PICC line placed yesterday.  I would started    Medications:  Continuous Infusions:   sodium chloride 0.9%       Scheduled Meds:   allopurinol  50 mg Oral Daily    bimatoprost  1 drop Left Eye QHS    lidocaine (PF) 10 mg/ml (1%)  1 mL Other Once    simvastatin  10 mg Oral QHS    sodium bicarbonate  650 mg Oral TID    sodium chloride 0.9%  10 mL Intravenous Q6H     PRN Meds:acetaminophen, dextrose 50%, dextrose 50%, glucagon (human recombinant), glucose, glucose, HYDROcodone-acetaminophen, insulin aspart U-100, ondansetron, promethazine (PHENERGAN) IVPB, ramelteon, Flushing PICC Protocol **AND** sodium chloride 0.9% **AND** sodium chloride 0.9%     Review of patient's allergies indicates:  No Known Allergies  Objective:     Vital Signs (Most Recent):  Temp: 97.6 °F (36.4 °C) (01/16/19 0810)  Pulse: 103 (01/16/19 0910)  Resp: 20 (01/16/19 0810)  BP: 104/68 (01/16/19 0810)  SpO2: 98 % (01/16/19 0810) Vital Signs (24h Range):  Temp:  [97.5 °F (36.4 °C)-99 °F (37.2 °C)] 97.6 °F (36.4 °C)  Pulse:  [] 103  Resp:  [17-20] 20  SpO2:  [96 %-99 %] 98 %  BP: ()/(57-68) 104/68     Weight: 77.9 kg (171 lb 11.8 oz)  Body mass index is 26.11 kg/m².    Intake/Output - Last 3 Shifts       01/14 0700 - 01/15 0659 01/15 0700 - 01/16 0659 01/16 0700 - 01/17 0659    P.O.  480 180    Total Intake(mL/kg)  480 (6.2) 180 (2.3)    Urine (mL/kg/hr)  350     Stool  300 250    Total Output  650 250    Net  -170 -70                 Physical Exam   Constitutional: No distress.   Chronically ill   HENT:   Head: Normocephalic.   Eyes: Pupils are equal, round, and reactive to light.   Neck: Normal range of motion.   Cardiovascular: Normal rate, regular rhythm and normal heart sounds.   Pulmonary/Chest: Effort normal and breath sounds normal.   Abdominal: Soft. Bowel sounds are normal.   Ileostomy in place   Musculoskeletal: Normal range of motion.   Neurological: He is alert.   Skin: Skin is warm. Capillary  refill takes less than 2 seconds.   Skin irritation around the ileostomy   Psychiatric: He has a normal mood and affect. His behavior is normal. Judgment and thought content normal.   Nursing note and vitals reviewed.      Significant Labs:  CBC: No results for input(s): WBC, RBC, HGB, HCT, PLT, MCV, MCH, MCHC in the last 168 hours.  BMP:   Recent Labs   Lab 01/16/19  0555   *   *   K 4.8   CL 96   CO2 23   BUN 50*   CREATININE 2.8*   CALCIUM 9.8     CMP:   Recent Labs   Lab 01/14/19  1440  01/16/19  0555   *  167*   < > 119*   CALCIUM 10.0  10.0   < > 9.8   ALBUMIN 3.2*  3.2*  --   --    PROT 7.8  --   --    *  125*   < > 129*   K 4.8  4.8   < > 4.8   CO2 25  25   < > 23   CL 92*  92*   < > 96   BUN 59*  59*   < > 50*   CREATININE 3.6*  3.6*   < > 2.8*   ALKPHOS 104  --   --    ALT 36  --   --    AST 33  --   --    BILITOT 0.5  --   --     < > = values in this interval not displayed.       Significant Diagnostics:  None

## 2019-01-16 NOTE — ASSESSMENT & PLAN NOTE
Seen by Cardiology.  Patient has a moderate to high risk for cardiac events however there were no cardiac events the previous surgeries

## 2019-01-16 NOTE — PROGRESS NOTES
[facility] -   General Surgery  Progress Note    Subjective:     History of Present Illness:  Patient is status post colostomy reversal.  He had a protective ileostomy done due to a leak. A Gastrografin enema showed no evidence of a leak. The drain fell out and was left out.    The patient has had issues with ileostomy output.  The ileostomy output ranges from 500-700 and occasionally 1400 mL a day.  This has created issues with renal failure and hyponatremia.  The patient is admitted now for correction of his hyponatremia and renal failure.  We will check his nutritional status.  Consideration will be given to ileostomy reversal during this admission.  Will ask Cardiology to see him to re-evaluate his cardiac risk says he has been at an increased risk for surgical procedures in the past    Post-Op Info:  Procedure(s) (LRB):  SIGMOIDOSCOPY, FLEXIBLE (N/A)         Interval History:  PICC line placed yesterday.  I would started    Medications:  Continuous Infusions:   sodium chloride 0.9%       Scheduled Meds:   allopurinol  50 mg Oral Daily    bimatoprost  1 drop Left Eye QHS    lidocaine (PF) 10 mg/ml (1%)  1 mL Other Once    simvastatin  10 mg Oral QHS    sodium bicarbonate  650 mg Oral TID    sodium chloride 0.9%  10 mL Intravenous Q6H     PRN Meds:acetaminophen, dextrose 50%, dextrose 50%, glucagon (human recombinant), glucose, glucose, HYDROcodone-acetaminophen, insulin aspart U-100, ondansetron, promethazine (PHENERGAN) IVPB, ramelteon, Flushing PICC Protocol **AND** sodium chloride 0.9% **AND** sodium chloride 0.9%     Review of patient's allergies indicates:  No Known Allergies  Objective:     Vital Signs (Most Recent):  Temp: 97.6 °F (36.4 °C) (01/16/19 0810)  Pulse: 103 (01/16/19 0910)  Resp: 20 (01/16/19 0810)  BP: 104/68 (01/16/19 0810)  SpO2: 98 % (01/16/19 0810) Vital Signs (24h Range):  Temp:  [97.5 °F (36.4 °C)-99 °F (37.2 °C)] 97.6 °F (36.4 °C)  Pulse:  [] 103  Resp:   [17-20] 20  SpO2:  [96 %-99 %] 98 %  BP: ()/(57-68) 104/68     Weight: 77.9 kg (171 lb 11.8 oz)  Body mass index is 26.11 kg/m².    Intake/Output - Last 3 Shifts       01/14 0700 - 01/15 0659 01/15 0700 - 01/16 0659 01/16 0700 - 01/17 0659    P.O.  480 180    Total Intake(mL/kg)  480 (6.2) 180 (2.3)    Urine (mL/kg/hr)  350     Stool  300 250    Total Output  650 250    Net  -170 -70                 Physical Exam   Constitutional: No distress.   Chronically ill   HENT:   Head: Normocephalic.   Eyes: Pupils are equal, round, and reactive to light.   Neck: Normal range of motion.   Cardiovascular: Normal rate, regular rhythm and normal heart sounds.   Pulmonary/Chest: Effort normal and breath sounds normal.   Abdominal: Soft. Bowel sounds are normal.   Ileostomy in place   Musculoskeletal: Normal range of motion.   Neurological: He is alert.   Skin: Skin is warm. Capillary refill takes less than 2 seconds.   Skin irritation around the ileostomy   Psychiatric: He has a normal mood and affect. His behavior is normal. Judgment and thought content normal.   Nursing note and vitals reviewed.      Significant Labs:  CBC: No results for input(s): WBC, RBC, HGB, HCT, PLT, MCV, MCH, MCHC in the last 168 hours.  BMP:   Recent Labs   Lab 01/16/19  0555   *   *   K 4.8   CL 96   CO2 23   BUN 50*   CREATININE 2.8*   CALCIUM 9.8     CMP:   Recent Labs   Lab 01/14/19  1440  01/16/19  0555   *  167*   < > 119*   CALCIUM 10.0  10.0   < > 9.8   ALBUMIN 3.2*  3.2*  --   --    PROT 7.8  --   --    *  125*   < > 129*   K 4.8  4.8   < > 4.8   CO2 25  25   < > 23   CL 92*  92*   < > 96   BUN 59*  59*   < > 50*   CREATININE 3.6*  3.6*   < > 2.8*   ALKPHOS 104  --   --    ALT 36  --   --    AST 33  --   --    BILITOT 0.5  --   --     < > = values in this interval not displayed.       Significant Diagnostics:  None    Assessment/Plan:     * Hyponatremia    Normal saline at 100 mL an hour for the  last Nephrology to see consider the use of hypertonic saline     Hyperkalemia    Monitor     Alteration in skin integrity    Irritated skin around the ileostomy, wound care     Acute renal failure    Normal saline at 100 mL an hour  Nephrology consultation for consideration of hypertonic saline     Gastrointestinal anastomotic leak    Gastrografin enema shows that this has resolved.  Plan sigmoidoscopy later this week     Acute on chronic renal failure    Nephrology consultation normal saline at 100 mL an hour     Ileostomy in place    This is likely contributing to his electrolytes fluid abnormalities.  Will consider ileostomy reversal this admission, likely next week.    Wound care consult for ileostomy care     Chronic systolic heart failure    Monitor     Biventricular ICD (implantable cardioverter-defibrillator) in place    Monitor with telemetry     CAD, multiple vessel    As cardiology re-evaluate the patient and comment on his risk for ileostomy reversal, from a cardiac point     Ischemic cardiomyopathy    Seen by Cardiology.  Patient has a moderate to high risk for cardiac events however there were no cardiac events the previous surgeries     Hypertension associated with diabetes    Home medications         Kevin Lindsay MD  General Surgery  [facility]

## 2019-01-16 NOTE — ASSESSMENT & PLAN NOTE
This is likely contributing to his electrolytes fluid abnormalities.  Will consider ileostomy reversal this admission, likely next week.    Wound care consult for ileostomy care

## 2019-01-17 LAB
ANION GAP SERPL CALC-SCNC: 8 MMOL/L
ANION GAP SERPL CALC-SCNC: 9 MMOL/L
BUN SERPL-MCNC: 37 MG/DL
BUN SERPL-MCNC: 39 MG/DL
CALCIUM SERPL-MCNC: 9.1 MG/DL
CALCIUM SERPL-MCNC: 9.2 MG/DL
CHLORIDE SERPL-SCNC: 102 MMOL/L
CHLORIDE SERPL-SCNC: 104 MMOL/L
CO2 SERPL-SCNC: 20 MMOL/L
CO2 SERPL-SCNC: 21 MMOL/L
CREAT SERPL-MCNC: 1.9 MG/DL
CREAT SERPL-MCNC: 2.1 MG/DL
EST. GFR  (AFRICAN AMERICAN): 37 ML/MIN/1.73 M^2
EST. GFR  (AFRICAN AMERICAN): 42 ML/MIN/1.73 M^2
EST. GFR  (NON AFRICAN AMERICAN): 32 ML/MIN/1.73 M^2
EST. GFR  (NON AFRICAN AMERICAN): 36 ML/MIN/1.73 M^2
GLUCOSE SERPL-MCNC: 104 MG/DL
GLUCOSE SERPL-MCNC: 106 MG/DL
POCT GLUCOSE: 109 MG/DL (ref 70–110)
POCT GLUCOSE: 118 MG/DL (ref 70–110)
POCT GLUCOSE: 125 MG/DL (ref 70–110)
POCT GLUCOSE: 168 MG/DL (ref 70–110)
POTASSIUM SERPL-SCNC: 4.4 MMOL/L
POTASSIUM SERPL-SCNC: 4.5 MMOL/L
SODIUM SERPL-SCNC: 132 MMOL/L
SODIUM SERPL-SCNC: 132 MMOL/L

## 2019-01-17 PROCEDURE — 80048 BASIC METABOLIC PNL TOTAL CA: CPT

## 2019-01-17 PROCEDURE — 25000003 PHARM REV CODE 250: Performed by: SURGERY

## 2019-01-17 PROCEDURE — 21400001 HC TELEMETRY ROOM

## 2019-01-17 PROCEDURE — 11000001 HC ACUTE MED/SURG PRIVATE ROOM

## 2019-01-17 PROCEDURE — 99232 PR SUBSEQUENT HOSPITAL CARE,LEVL II: ICD-10-PCS | Mod: ,,, | Performed by: INTERNAL MEDICINE

## 2019-01-17 PROCEDURE — 99232 SBSQ HOSP IP/OBS MODERATE 35: CPT | Mod: ,,, | Performed by: INTERNAL MEDICINE

## 2019-01-17 PROCEDURE — 25000003 PHARM REV CODE 250: Performed by: INTERNAL MEDICINE

## 2019-01-17 PROCEDURE — A4216 STERILE WATER/SALINE, 10 ML: HCPCS | Performed by: SURGERY

## 2019-01-17 PROCEDURE — 63600175 PHARM REV CODE 636 W HCPCS: Performed by: SURGERY

## 2019-01-17 RX ORDER — ALLOPURINOL 100 MG/1
TABLET ORAL
Qty: 90 TABLET | Refills: 0 | Status: SHIPPED | OUTPATIENT
Start: 2019-01-17 | End: 2019-06-03 | Stop reason: SDUPTHER

## 2019-01-17 RX ORDER — ENOXAPARIN SODIUM 100 MG/ML
30 INJECTION SUBCUTANEOUS ONCE
Status: COMPLETED | OUTPATIENT
Start: 2019-01-17 | End: 2019-01-17

## 2019-01-17 RX ADMIN — ENOXAPARIN SODIUM 30 MG: 100 INJECTION SUBCUTANEOUS at 10:01

## 2019-01-17 RX ADMIN — SODIUM BICARBONATE 650 MG TABLET 650 MG: at 09:01

## 2019-01-17 RX ADMIN — SIMVASTATIN 10 MG: 5 TABLET, FILM COATED ORAL at 09:01

## 2019-01-17 RX ADMIN — BIMATOPROST 1 DROP: 0.1 SOLUTION/ DROPS OPHTHALMIC at 09:01

## 2019-01-17 RX ADMIN — Medication 10 ML: at 05:01

## 2019-01-17 RX ADMIN — SODIUM CHLORIDE: 0.9 INJECTION, SOLUTION INTRAVENOUS at 08:01

## 2019-01-17 RX ADMIN — SODIUM BICARBONATE 650 MG TABLET 650 MG: at 02:01

## 2019-01-17 RX ADMIN — Medication 10 ML: at 11:01

## 2019-01-17 RX ADMIN — SODIUM BICARBONATE 650 MG TABLET 650 MG: at 08:01

## 2019-01-17 RX ADMIN — ALLOPURINOL 50 MG: 300 TABLET ORAL at 08:01

## 2019-01-17 RX ADMIN — Medication 10 ML: at 12:01

## 2019-01-17 NOTE — PROGRESS NOTES
[facility] -   Nephrology  Progress Note    Patient Name: Yan Choudhury  MRN: [id number]  Admission Date: 1/15/2019  Hospital Length of Stay: 2 days  Attending Provider: Kevin Lindsay MD   Primary Care Physician: Sandra Estevez MD  Principal Problem:Hyponatremia    Subjective:     HPI: Yan Choudhury is a 62 y/o AA man with history of hypertension, congestive heart failure, cardiomyopathy, chronic kidney disease stage 3, baseline creatinine about 1.5-2, chronic and mild hyponatremia, he had been having complications from his ileostomy, increased ileostomy outputs, was admitted to the hospital for abnormal labs including hyponatremia and worsening renal function, serum creatinine increased to 3.5 mg/dL and sodium worsened to 125, patient admitted for gentle hydration and once clinically stable to Plan reversal of his ileostomy, he has history of combined systolic and diastolic heart failure with ejection fraction of about 30%, mild hyperkalemia noted on his labs likely due to Ace inhibitors he was on prior to admission and also acute on chronic kidney failure.      Interval History: no acute events, s/p PICC in right arm ( 01/15/19 )     Review of patient's allergies indicates:No Known Allergies      Current Facility-Administered Medications   Medication Frequency    0.9%  NaCl infusion Continuous    acetaminophen tablet 650 mg Q8H PRN    allopurinol split tablet 50 mg Daily    bimatoprost 0.01 % ophthalmic drops 1 drop QHS    dextrose 50% injection 12.5 g PRN    dextrose 50% injection 25 g PRN    glucagon (human recombinant) injection 1 mg PRN    glucose chewable tablet 16 g PRN    glucose chewable tablet 24 g PRN    HYDROcodone-acetaminophen 5-325 mg per tablet 1 tablet Q6H PRN    [START ON 1/18/2019] influenza (FLUZONE HIGH-DOSE) vaccine 0.5 mL Prior to discharge    insulin aspart U-100 pen 1-10 Units QID (AC + HS) PRN    lidocaine (PF) 10 mg/ml (1%) injection 10 mg Once    ondansetron  disintegrating tablet 8 mg Q8H PRN    promethazine (PHENERGAN) 6.25 mg in dextrose 5 % 50 mL IVPB Q6H PRN    ramelteon tablet 8 mg Nightly PRN    simvastatin tablet 10 mg QHS    sodium bicarbonate tablet 650 mg TID    sodium chloride 0.9% flush 10 mL Q6H    And    sodium chloride 0.9% flush 10 mL PRN       Objective:     Vital Signs (Most Recent):  Temp: 98.1 °F (36.7 °C) (01/17/19 0837)  Pulse: 97 (01/17/19 1057)  Resp: 20 (01/17/19 0837)  BP: 106/63 (01/17/19 0837)  SpO2: 98 % (01/17/19 0837)  O2 Device (Oxygen Therapy): room air (01/17/19 0837) Vital Signs (24h Range):  Temp:  [97.5 °F (36.4 °C)-98.1 °F (36.7 °C)] 98.1 °F (36.7 °C)  Pulse:  [] 97  Resp:  [16-20] 20  SpO2:  [97 %-98 %] 98 %  BP: ()/(63-73) 106/63     Weight: 77.9 kg (171 lb 11.8 oz) (01/15/19 1142)  Body mass index is 26.11 kg/m².  Body surface area is 1.93 meters squared.    I/O last 3 completed shifts:  In: 2833.3 [P.O.:900; I.V.:1933.3]  Out: 950 [Urine:150; Stool:800]    Physical Exam   Constitutional: He is oriented to person, place, and time. He appears well-developed. No distress.   HENT:   Head: Normocephalic and atraumatic.   Eyes: Pupils are equal, round, and reactive to light.   Neck: Normal range of motion. Neck supple. No tracheal deviation present. No thyromegaly present.   Cardiovascular: Normal rate, regular rhythm and intact distal pulses. Exam reveals no gallop and no friction rub.   Murmur heard.  Pulmonary/Chest: Breath sounds normal. He has no wheezes. He has no rales.   Abdominal: Soft. He exhibits no mass. There is no tenderness. There is no rebound and no guarding.   Ileostomy bag in place    Musculoskeletal: Normal range of motion. He exhibits no edema.   Lymphadenopathy:     He has no cervical adenopathy.   Neurological: He is alert and oriented to person, place, and time.   Skin: Skin is warm. No rash noted. He is not diaphoretic. No erythema.   Nursing note and vitals reviewed.      Significant  Labs:  CBC: No results for input(s): WBC, RBC, HGB, HCT, PLT, MCV, MCH, MCHC in the last 168 hours.  CMP:   Recent Labs   Lab 01/14/19  1440  01/17/19  0529   *  167*   < > 106   CALCIUM 10.0  10.0   < > 9.2   ALBUMIN 3.2*  3.2*  --   --    PROT 7.8  --   --    *  125*   < > 132*   K 4.8  4.8   < > 4.5   CO2 25  25   < > 20*   CL 92*  92*   < > 104   BUN 59*  59*   < > 37*   CREATININE 3.6*  3.6*   < > 1.9*   ALKPHOS 104  --   --    ALT 36  --   --    AST 33  --   --    BILITOT 0.5  --   --     < > = values in this interval not displayed.     Coagulation: No results for input(s): PT, INR, APTT in the last 168 hours.  LFTs:   Recent Labs   Lab 01/14/19  1440   ALT 36   AST 33   ALKPHOS 104   BILITOT 0.5   PROT 7.8   ALBUMIN 3.2*  3.2*     All labs within the past 24 hours have been reviewed.     Significant Imaging:  Reviewed    Lab Results   Component Value Date    URICACID 8.7 (H) 01/16/2019         Assessment/Plan:     CKD (chronic kidney disease) stage 4, GFR 15-29 ml/min    1. 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  Hyperkalemia - improved     3.  Metabolic acidosis -  on sodium bicarbonate supplements,    4.  Hyponatremia - acute on chronic, worsening in serum sodium levels due to acute kidney injury and dehydration,     continue IV fluids and monitor serum sodium levels closely, sodium level better at 132,     5.  Anemia of chronic kidney disease - stable hemoglobin, PRBC transfusion when indicated,    6.  Possible ileostomy reversal when clinically stable , ok from renal stand point for surgery,     7. CHF , LVEF about 30 % , currently on IV fluids due to dehydration , reduce rate to 50 ml/hr ,                  I will follow-up with  patient. Please contact us if you have any additional questions.     Total time spent 40 minutes including time needed to review the records,  patient  evaluation, documentation, face-to-face discussion with the patient, spouse. Primary team, more than 50% of the time was spent on coordination of care and counseling.       Hernandez Martinez MD  Nephrology  [facility]

## 2019-01-17 NOTE — SUBJECTIVE & OBJECTIVE
Interval History:   Feeling better, labs improved, for ilestomy reversal Tuesday,  Sigmoidoscopy tomorrow  Medications:  Continuous Infusions:   sodium chloride 0.9% 100 mL/hr at 01/16/19 1140     Scheduled Meds:   allopurinol  50 mg Oral Daily    bimatoprost  1 drop Left Eye QHS    lidocaine (PF) 10 mg/ml (1%)  1 mL Other Once    simvastatin  10 mg Oral QHS    sodium bicarbonate  650 mg Oral TID    sodium chloride 0.9%  10 mL Intravenous Q6H     PRN Meds:acetaminophen, dextrose 50%, dextrose 50%, glucagon (human recombinant), glucose, glucose, HYDROcodone-acetaminophen, [START ON 1/18/2019] influenza, insulin aspart U-100, ondansetron, promethazine (PHENERGAN) IVPB, ramelteon, Flushing PICC Protocol **AND** sodium chloride 0.9% **AND** sodium chloride 0.9%     Review of patient's allergies indicates:  No Known Allergies  Objective:     Vital Signs (Most Recent):  Temp: 97.9 °F (36.6 °C) (01/17/19 0454)  Pulse: 98 (01/17/19 0529)  Resp: 18 (01/17/19 0454)  BP: 124/69 (01/17/19 0454)  SpO2: 98 % (01/17/19 0454) Vital Signs (24h Range):  Temp:  [97.5 °F (36.4 °C)-98 °F (36.7 °C)] 97.9 °F (36.6 °C)  Pulse:  [] 98  Resp:  [16-20] 18  SpO2:  [97 %-98 %] 98 %  BP: ()/(63-73) 124/69     Weight: 77.9 kg (171 lb 11.8 oz)  Body mass index is 26.11 kg/m².    Intake/Output - Last 3 Shifts       01/15 0700 - 01/16 0659 01/16 0700 - 01/17 0659 01/17 0700 - 01/18 0659    P.O. 480 900     I.V. (mL/kg)  1233.3 (15.8) 700 (9)    Total Intake(mL/kg) 480 (6.2) 2133.3 (27.4) 700 (9)    Urine (mL/kg/hr) 350 50 (0)     Stool 300 750     Total Output 650 800     Net -170 +1333.3 +700           Urine Occurrence  4 x     Stool Occurrence  1 x           Physical Exam   Constitutional:   Chronically ill   HENT:   Head: Normocephalic and atraumatic.   Neck: Normal range of motion. Neck supple.   Cardiovascular: Normal rate and regular rhythm.   Pulmonary/Chest: Effort normal and breath sounds normal.   Abdominal: Soft.    Ileostomy  Skin irration around ilesotmy   Musculoskeletal: He exhibits no edema.   Neurological: He is alert.   Skin: Skin is warm and dry. Capillary refill takes less than 2 seconds.   Psychiatric: He has a normal mood and affect. His behavior is normal. Judgment and thought content normal.   Vitals reviewed.      Significant Labs:  CBC: No results for input(s): WBC, RBC, HGB, HCT, PLT, MCV, MCH, MCHC in the last 168 hours.  BMP:   Recent Labs   Lab 01/17/19  0529      *   K 4.5      CO2 20*   BUN 37*   CREATININE 1.9*   CALCIUM 9.2     CMP:   Recent Labs   Lab 01/14/19  1440  01/17/19  0529   *  167*   < > 106   CALCIUM 10.0  10.0   < > 9.2   ALBUMIN 3.2*  3.2*  --   --    PROT 7.8  --   --    *  125*   < > 132*   K 4.8  4.8   < > 4.5   CO2 25  25   < > 20*   CL 92*  92*   < > 104   BUN 59*  59*   < > 37*   CREATININE 3.6*  3.6*   < > 1.9*   ALKPHOS 104  --   --    ALT 36  --   --    AST 33  --   --    BILITOT 0.5  --   --     < > = values in this interval not displayed.       Significant Diagnostics:  none

## 2019-01-17 NOTE — SUBJECTIVE & OBJECTIVE
Interval History: no acute events, s/p PICC in right arm ( 01/15/19 )     Review of patient's allergies indicates:No Known Allergies      Current Facility-Administered Medications   Medication Frequency    0.9%  NaCl infusion Continuous    acetaminophen tablet 650 mg Q8H PRN    allopurinol split tablet 50 mg Daily    bimatoprost 0.01 % ophthalmic drops 1 drop QHS    dextrose 50% injection 12.5 g PRN    dextrose 50% injection 25 g PRN    glucagon (human recombinant) injection 1 mg PRN    glucose chewable tablet 16 g PRN    glucose chewable tablet 24 g PRN    HYDROcodone-acetaminophen 5-325 mg per tablet 1 tablet Q6H PRN    [START ON 1/18/2019] influenza (FLUZONE HIGH-DOSE) vaccine 0.5 mL Prior to discharge    insulin aspart U-100 pen 1-10 Units QID (AC + HS) PRN    lidocaine (PF) 10 mg/ml (1%) injection 10 mg Once    ondansetron disintegrating tablet 8 mg Q8H PRN    promethazine (PHENERGAN) 6.25 mg in dextrose 5 % 50 mL IVPB Q6H PRN    ramelteon tablet 8 mg Nightly PRN    simvastatin tablet 10 mg QHS    sodium bicarbonate tablet 650 mg TID    sodium chloride 0.9% flush 10 mL Q6H    And    sodium chloride 0.9% flush 10 mL PRN       Objective:     Vital Signs (Most Recent):  Temp: 98.1 °F (36.7 °C) (01/17/19 0837)  Pulse: 97 (01/17/19 1057)  Resp: 20 (01/17/19 0837)  BP: 106/63 (01/17/19 0837)  SpO2: 98 % (01/17/19 0837)  O2 Device (Oxygen Therapy): room air (01/17/19 0837) Vital Signs (24h Range):  Temp:  [97.5 °F (36.4 °C)-98.1 °F (36.7 °C)] 98.1 °F (36.7 °C)  Pulse:  [] 97  Resp:  [16-20] 20  SpO2:  [97 %-98 %] 98 %  BP: ()/(63-73) 106/63     Weight: 77.9 kg (171 lb 11.8 oz) (01/15/19 1142)  Body mass index is 26.11 kg/m².  Body surface area is 1.93 meters squared.    I/O last 3 completed shifts:  In: 2833.3 [P.O.:900; I.V.:1933.3]  Out: 950 [Urine:150; Stool:800]    Physical Exam   Constitutional: He is oriented to person, place, and time. He appears well-developed. No distress.    HENT:   Head: Normocephalic and atraumatic.   Eyes: Pupils are equal, round, and reactive to light.   Neck: Normal range of motion. Neck supple. No tracheal deviation present. No thyromegaly present.   Cardiovascular: Normal rate, regular rhythm and intact distal pulses. Exam reveals no gallop and no friction rub.   Murmur heard.  Pulmonary/Chest: Breath sounds normal. He has no wheezes. He has no rales.   Abdominal: Soft. He exhibits no mass. There is no tenderness. There is no rebound and no guarding.   Ileostomy bag in place    Musculoskeletal: Normal range of motion. He exhibits no edema.   Lymphadenopathy:     He has no cervical adenopathy.   Neurological: He is alert and oriented to person, place, and time.   Skin: Skin is warm. No rash noted. He is not diaphoretic. No erythema.   Nursing note and vitals reviewed.      Significant Labs:  CBC: No results for input(s): WBC, RBC, HGB, HCT, PLT, MCV, MCH, MCHC in the last 168 hours.  CMP:   Recent Labs   Lab 01/14/19  1440  01/17/19  0529   *  167*   < > 106   CALCIUM 10.0  10.0   < > 9.2   ALBUMIN 3.2*  3.2*  --   --    PROT 7.8  --   --    *  125*   < > 132*   K 4.8  4.8   < > 4.5   CO2 25  25   < > 20*   CL 92*  92*   < > 104   BUN 59*  59*   < > 37*   CREATININE 3.6*  3.6*   < > 1.9*   ALKPHOS 104  --   --    ALT 36  --   --    AST 33  --   --    BILITOT 0.5  --   --     < > = values in this interval not displayed.     Coagulation: No results for input(s): PT, INR, APTT in the last 168 hours.  LFTs:   Recent Labs   Lab 01/14/19  1440   ALT 36   AST 33   ALKPHOS 104   BILITOT 0.5   PROT 7.8   ALBUMIN 3.2*  3.2*     All labs within the past 24 hours have been reviewed.     Significant Imaging:  Reviewed    Lab Results   Component Value Date    URICACID 8.7 (H) 01/16/2019

## 2019-01-17 NOTE — ASSESSMENT & PLAN NOTE
1. 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  Hyperkalemia - improved     3.  Metabolic acidosis -  on sodium bicarbonate supplements,    4.  Hyponatremia - acute on chronic, worsening in serum sodium levels due to acute kidney injury and dehydration,     continue IV fluids and monitor serum sodium levels closely, sodium level better at 132,     5.  Anemia of chronic kidney disease - stable hemoglobin, PRBC transfusion when indicated,    6.  Possible ileostomy reversal when clinically stable , ok from renal stand point for surgery,     7. CHF , LVEF about 30 % , currently on IV fluids due to dehydration , reduce rate to 50 ml/hr ,

## 2019-01-17 NOTE — PLAN OF CARE
Problem: Adult Inpatient Plan of Care  Goal: Plan of Care Review  Outcome: Ongoing (interventions implemented as appropriate)  Pt remained free of injury during shift, stable condition, denied pain, no acute distress, receiving IV fluids, ileostomy producing stool/flatus, blood glucose monitoring performed, on cardiac monitoring (SR-ST paced, HR 90-100s), and will continue to monitor. 24hr chart review performed.

## 2019-01-17 NOTE — NURSING
Chart reviewed for diabetes  patient has been seen by this nurse on previous admit  No further action unless diabetes educational needs are identified

## 2019-01-18 ENCOUNTER — ANESTHESIA (OUTPATIENT)
Dept: ENDOSCOPY | Facility: HOSPITAL | Age: 64
DRG: 683 | End: 2019-01-18
Payer: MEDICARE

## 2019-01-18 ENCOUNTER — ANESTHESIA EVENT (OUTPATIENT)
Dept: ENDOSCOPY | Facility: HOSPITAL | Age: 64
DRG: 683 | End: 2019-01-18
Payer: MEDICARE

## 2019-01-18 VITALS
SYSTOLIC BLOOD PRESSURE: 120 MMHG | RESPIRATION RATE: 18 BRPM | OXYGEN SATURATION: 99 % | HEART RATE: 96 BPM | TEMPERATURE: 99 F | BODY MASS INDEX: 26.03 KG/M2 | DIASTOLIC BLOOD PRESSURE: 72 MMHG | WEIGHT: 171.75 LBS | HEIGHT: 68 IN

## 2019-01-18 DIAGNOSIS — Z93.2 ILEOSTOMY IN PLACE: Primary | ICD-10-CM

## 2019-01-18 PROBLEM — E87.20 ACIDOSIS, METABOLIC: Status: ACTIVE | Noted: 2019-01-18

## 2019-01-18 LAB
ANION GAP SERPL CALC-SCNC: 8 MMOL/L
BUN SERPL-MCNC: 23 MG/DL
CALCIUM SERPL-MCNC: 9.2 MG/DL
CHLORIDE SERPL-SCNC: 108 MMOL/L
CO2 SERPL-SCNC: 20 MMOL/L
CREAT SERPL-MCNC: 1.4 MG/DL
EST. GFR  (AFRICAN AMERICAN): >60 ML/MIN/1.73 M^2
EST. GFR  (NON AFRICAN AMERICAN): 52 ML/MIN/1.73 M^2
GLUCOSE SERPL-MCNC: 95 MG/DL
POCT GLUCOSE: 108 MG/DL (ref 70–110)
POCT GLUCOSE: 88 MG/DL (ref 70–110)
POTASSIUM SERPL-SCNC: 4.2 MMOL/L
SODIUM SERPL-SCNC: 136 MMOL/L

## 2019-01-18 PROCEDURE — 25000003 PHARM REV CODE 250: Performed by: NURSE ANESTHETIST, CERTIFIED REGISTERED

## 2019-01-18 PROCEDURE — 99233 SBSQ HOSP IP/OBS HIGH 50: CPT | Mod: ,,, | Performed by: INTERNAL MEDICINE

## 2019-01-18 PROCEDURE — 99233 PR SUBSEQUENT HOSPITAL CARE,LEVL III: ICD-10-PCS | Mod: ,,, | Performed by: INTERNAL MEDICINE

## 2019-01-18 PROCEDURE — 45330 DIAGNOSTIC SIGMOIDOSCOPY: CPT | Performed by: COLON & RECTAL SURGERY

## 2019-01-18 PROCEDURE — 37000008 HC ANESTHESIA 1ST 15 MINUTES: Performed by: COLON & RECTAL SURGERY

## 2019-01-18 PROCEDURE — 45330 DIAGNOSTIC SIGMOIDOSCOPY: CPT | Mod: ,,, | Performed by: COLON & RECTAL SURGERY

## 2019-01-18 PROCEDURE — 45330 PR SIGMOIDOSCOPY,DIAG2STIC: ICD-10-PCS | Mod: ,,, | Performed by: COLON & RECTAL SURGERY

## 2019-01-18 PROCEDURE — 25000003 PHARM REV CODE 250: Performed by: INTERNAL MEDICINE

## 2019-01-18 PROCEDURE — 80048 BASIC METABOLIC PNL TOTAL CA: CPT

## 2019-01-18 PROCEDURE — 37000009 HC ANESTHESIA EA ADD 15 MINS: Performed by: COLON & RECTAL SURGERY

## 2019-01-18 RX ORDER — DIPHENOXYLATE HYDROCHLORIDE AND ATROPINE SULFATE 2.5; .025 MG/1; MG/1
1 TABLET ORAL 4 TIMES DAILY PRN
Status: DISCONTINUED | OUTPATIENT
Start: 2019-01-18 | End: 2019-01-18 | Stop reason: HOSPADM

## 2019-01-18 RX ORDER — METRONIDAZOLE 500 MG/100ML
500 INJECTION, SOLUTION INTRAVENOUS
Status: CANCELLED | OUTPATIENT
Start: 2019-01-18

## 2019-01-18 RX ORDER — LIDOCAINE HYDROCHLORIDE 10 MG/ML
INJECTION INFILTRATION; PERINEURAL
Status: DISCONTINUED | OUTPATIENT
Start: 2019-01-18 | End: 2019-01-18

## 2019-01-18 RX ORDER — LIDOCAINE HYDROCHLORIDE 10 MG/ML
1 INJECTION, SOLUTION EPIDURAL; INFILTRATION; INTRACAUDAL; PERINEURAL ONCE
Status: DISCONTINUED | OUTPATIENT
Start: 2019-01-18 | End: 2019-01-18 | Stop reason: HOSPADM

## 2019-01-18 RX ORDER — SODIUM CHLORIDE, SODIUM LACTATE, POTASSIUM CHLORIDE, CALCIUM CHLORIDE 600; 310; 30; 20 MG/100ML; MG/100ML; MG/100ML; MG/100ML
INJECTION, SOLUTION INTRAVENOUS CONTINUOUS PRN
Status: DISCONTINUED | OUTPATIENT
Start: 2019-01-18 | End: 2019-01-18

## 2019-01-18 RX ORDER — ONDANSETRON 4 MG/1
8 TABLET, ORALLY DISINTEGRATING ORAL EVERY 8 HOURS PRN
Status: CANCELLED | OUTPATIENT
Start: 2019-01-18

## 2019-01-18 RX ORDER — DIPHENOXYLATE HYDROCHLORIDE AND ATROPINE SULFATE 2.5; .025 MG/1; MG/1
1 TABLET ORAL 4 TIMES DAILY PRN
Qty: 30 TABLET | Refills: 0 | Status: ON HOLD | OUTPATIENT
Start: 2019-01-18 | End: 2019-01-25 | Stop reason: HOSPADM

## 2019-01-18 RX ORDER — ETOMIDATE 2 MG/ML
INJECTION INTRAVENOUS
Status: DISCONTINUED | OUTPATIENT
Start: 2019-01-18 | End: 2019-01-18

## 2019-01-18 RX ADMIN — SODIUM BICARBONATE 650 MG TABLET 650 MG: at 09:01

## 2019-01-18 RX ADMIN — ETOMIDATE 2 MG: 2 INJECTION, SOLUTION INTRAVENOUS at 08:01

## 2019-01-18 RX ADMIN — ETOMIDATE 4 MG: 2 INJECTION, SOLUTION INTRAVENOUS at 08:01

## 2019-01-18 RX ADMIN — LIDOCAINE HYDROCHLORIDE 50 MG: 10 INJECTION, SOLUTION INFILTRATION; PERINEURAL at 08:01

## 2019-01-18 RX ADMIN — SODIUM CHLORIDE, SODIUM LACTATE, POTASSIUM CHLORIDE, AND CALCIUM CHLORIDE: .6; .31; .03; .02 INJECTION, SOLUTION INTRAVENOUS at 08:01

## 2019-01-18 RX ADMIN — ETOMIDATE 10 MG: 2 INJECTION, SOLUTION INTRAVENOUS at 08:01

## 2019-01-18 RX ADMIN — ALLOPURINOL 50 MG: 300 TABLET ORAL at 09:01

## 2019-01-18 NOTE — SUBJECTIVE & OBJECTIVE
Interval History:     1/18/19:  Patient is resting in bed without any complaints. He will be discharged today and return next week for ileostomy reversal.     Review of patient's allergies indicates:  No Known Allergies  Current Facility-Administered Medications   Medication Frequency    0.9%  NaCl infusion Continuous    acetaminophen tablet 650 mg Q8H PRN    allopurinol split tablet 50 mg Daily    bimatoprost 0.01 % ophthalmic drops 1 drop QHS    dextrose 50% injection 12.5 g PRN    dextrose 50% injection 25 g PRN    diphenoxylate-atropine 2.5-0.025 mg per tablet 1 tablet QID PRN    glucagon (human recombinant) injection 1 mg PRN    glucose chewable tablet 16 g PRN    glucose chewable tablet 24 g PRN    HYDROcodone-acetaminophen 5-325 mg per tablet 1 tablet Q6H PRN    influenza (FLUZONE HIGH-DOSE) vaccine 0.5 mL Prior to discharge    insulin aspart U-100 pen 1-10 Units QID (AC + HS) PRN    lidocaine (PF) 10 mg/ml (1%) injection 10 mg Once    ondansetron disintegrating tablet 8 mg Q8H PRN    promethazine (PHENERGAN) 6.25 mg in dextrose 5 % 50 mL IVPB Q6H PRN    ramelteon tablet 8 mg Nightly PRN    simvastatin tablet 10 mg QHS    sodium bicarbonate tablet 650 mg TID    sodium chloride 0.9% flush 10 mL Q6H    And    sodium chloride 0.9% flush 10 mL PRN       Objective:     Vital Signs (Most Recent):  Temp: 97.5 °F (36.4 °C) (01/18/19 0849)  Pulse: 109 (01/18/19 0928)  Resp: 18 (01/18/19 0928)  BP: 125/79 (01/18/19 0928)  SpO2: 99 % (01/18/19 0928)  O2 Device (Oxygen Therapy): room air (01/18/19 0928) Vital Signs (24h Range):  Temp:  [97.4 °F (36.3 °C)-98.6 °F (37 °C)] 97.5 °F (36.4 °C)  Pulse:  [] 109  Resp:  [14-23] 18  SpO2:  [97 %-100 %] 99 %  BP: (105-135)/(63-89) 125/79     Weight: 77.9 kg (171 lb 11.8 oz) (01/15/19 1142)  Body mass index is 26.11 kg/m².  Body surface area is 1.93 meters squared.    I/O last 3 completed shifts:  In: 3097.5 [P.O.:960; I.V.:2137.5]  Out: 650 [Urine:50;  Stool:600]    Physical Exam   Constitutional: He is oriented to person, place, and time. He appears well-developed. He is cooperative.   HENT:   Head: Normocephalic.   Nose: No rhinorrhea.   Mouth/Throat: Mucous membranes are normal. No oropharyngeal exudate.   Eyes: Pupils are equal, round, and reactive to light.   Neck: No thyroid mass present.   Cardiovascular: Normal rate, regular rhythm, S1 normal, S2 normal and intact distal pulses.   Pulmonary/Chest: Effort normal. No respiratory distress. He has no wheezes.   Abdominal: Soft. Bowel sounds are normal. He exhibits no distension. There is no tenderness. No hernia.   Ileostomy bag in place.    Lymphadenopathy:     He has no cervical adenopathy.   Neurological: He is alert and oriented to person, place, and time.   Skin: Skin is warm and dry.   Psychiatric: He has a normal mood and affect.       Significant Labs:  Lab Results   Component Value Date    CREATININE 1.4 01/18/2019    BUN 23 01/18/2019     01/18/2019    K 4.2 01/18/2019     01/18/2019    CO2 20 (L) 01/18/2019     Lab Results   Component Value Date    .0 (H) 08/10/2018    CALCIUM 9.2 01/18/2019    PHOS 2.5 (L) 01/14/2019     Lab Results   Component Value Date    ALBUMIN 3.2 (L) 01/14/2019    ALBUMIN 3.2 (L) 01/14/2019     Lab Results   Component Value Date    WBC 9.45 12/27/2018    HGB 11.7 (L) 12/27/2018    HCT 36.4 (L) 12/27/2018    MCV 84 12/27/2018     12/27/2018     No results for input(s): MG in the last 168 hours.      Significant Imaging:

## 2019-01-18 NOTE — PROGRESS NOTES
Discharge instructions given, patient verbalized understanding. PICC in place for pt to go home with. HH set up. Patient to be discharged after tolerates lunch.

## 2019-01-18 NOTE — [employer] PATIENT INSTRUCTIONS
Discharge Summary/Instructions after an Endoscopic Procedure  Patient Name: Yan Choudhury  Patient MRN: [id number]  Patient YOB: 1955 Friday, January 18, 2019 Quinn Aragon MD  RESTRICTIONS:  During your procedure today, you received medications for sedation.  These   medications may affect your judgment, balance and coordination.  Therefore,   for 24 hours, you have the following restrictions:   - DO NOT drive a car, operate machinery, make legal/financial decisions,   sign important papers or drink alcohol.    ACTIVITY:  Today: no heavy lifting, straining or running due to procedural   sedation/anesthesia.  The following day: return to full activity including work.  DIET:  Eat and drink normally unless instructed otherwise.     TREATMENT FOR COMMON SIDE EFFECTS:  - Mild abdominal pain, nausea, belching, bloating or excessive gas:  rest,   eat lightly and use a heating pad.  - Sore Throat: treat with throat lozenges and/or gargle with warm salt   water.  - Because air was used during the procedure, expelling large amounts of air   from your rectum or belching is normal.  - If a bowel prep was taken, you may not have a bowel movement for 1-3 days.    This is normal.  SYMPTOMS TO WATCH FOR AND REPORT TO YOUR PHYSICIAN:  1. Abdominal pain or bloating, other than gas cramps.  2. Chest pain.  3. Back pain.  4. Signs of infection such as: chills or fever occurring within 24 hours   after the procedure.  5. Rectal bleeding, which would show as bright red, maroon, or black stools.   (A tablespoon of blood from the rectum is not serious, especially if   hemorrhoids are present.)  6. Vomiting.  7. Weakness or dizziness.  GO DIRECTLY TO THE NEAREST EMERGENCY ROOM IF YOU HAVE ANY OF THE FOLLOWING:      Difficulty breathing              Chills and/or fever over 101 F   Persistent vomiting and/or vomiting blood   Severe abdominal pain   Severe chest pain   Black, tarry stools   Bleeding- more than one  tablespoon   Any other symptom or condition that you feel may need urgent attention  Your doctor recommends these additional instructions:  If any biopsies were taken, your doctors clinic will contact you in 1 to 2   weeks with any results.  - Admit the patient to hospital [facility] for ongoing care.  For questions, problems or results please call your physician Quinn Aragon MD at Work:  [phone]  If you have any questions about the above instructions, call the GI   department at [phone] or call the endoscopy unit at [phone]   from 7am until 3 pm.  [facility] [place], EMERGENCY ROOM PHONE NUMBER:   [phone]  IF A COMPLICATION OR EMERGENCY SITUATION ARISES AND YOU ARE UNABLE TO REACH   YOUR PHYSICIAN - GO DIRECTLY TO THE EMERGENCY ROOM.  I have read or have had read to me these discharge instructions for my   procedure and have received a written copy.  I understand these   instructions and will follow-up with my physician if I have any questions.     __________________________________       _____________________________________  Nurse Signature                                          Patient/Designated   Responsible Party Signature  MD Quinn Leslie MD  1/18/2019 8:50:40 AM  This report has been verified and signed electronically.  [employer]

## 2019-01-18 NOTE — PROGRESS NOTES
[facility] -   General Surgery  Progress Note    Subjective:     History of Present Illness:  Patient is status post colostomy reversal.  He had a protective ileostomy done due to a leak. A Gastrografin enema showed no evidence of a leak. The drain fell out and was left out.    The patient has had issues with ileostomy output.  The ileostomy output ranges from 500-700 and occasionally 1400 mL a day.  This has created issues with renal failure and hyponatremia.  The patient is admitted now for correction of his hyponatremia and renal failure.  We will check his nutritional status.  Consideration will be given to ileostomy reversal during this admission.  Will ask Cardiology to see him to re-evaluate his cardiac risk says he has been at an increased risk for surgical procedures in the past    Post-Op Info:  Procedure(s) (LRB):  SIGMOIDOSCOPY, FLEXIBLE (N/A)   Day of Surgery     Interval History: for flex sig today    Medications:  Continuous Infusions:   sodium chloride 0.9% 50 mL/hr at 01/17/19 1238     Scheduled Meds:   allopurinol  50 mg Oral Daily    bimatoprost  1 drop Left Eye QHS    lidocaine (PF) 10 mg/ml (1%)  1 mL Other Once    simvastatin  10 mg Oral QHS    sodium bicarbonate  650 mg Oral TID    sodium chloride 0.9%  10 mL Intravenous Q6H     PRN Meds:acetaminophen, dextrose 50%, dextrose 50%, diphenoxylate-atropine 2.5-0.025 mg, glucagon (human recombinant), glucose, glucose, HYDROcodone-acetaminophen, influenza, insulin aspart U-100, ondansetron, promethazine (PHENERGAN) IVPB, ramelteon, Flushing PICC Protocol **AND** sodium chloride 0.9% **AND** sodium chloride 0.9%     Review of patient's allergies indicates:  No Known Allergies  Objective:     Vital Signs (Most Recent):  Temp: 98.1 °F (36.7 °C) (01/18/19 0756)  Pulse: 94 (01/18/19 0756)  Resp: (!) 21 (01/18/19 0756)  BP: 112/73 (01/18/19 0756)  SpO2: 99 % (01/18/19 0756) Vital Signs (24h Range):  Temp:  [97.4 °F (36.3 °C)-98.6 °F  (37 °C)] 98.1 °F (36.7 °C)  Pulse:  [] 94  Resp:  [16-21] 21  SpO2:  [97 %-100 %] 99 %  BP: (105-119)/(63-77) 112/73     Weight: 77.9 kg (171 lb 11.8 oz)  Body mass index is 26.11 kg/m².    Intake/Output - Last 3 Shifts       01/16 0700 - 01/17 0659 01/17 0700 - 01/18 0659 01/18 0700 - 01/19 0659    P.O. 900 720     I.V. (mL/kg) 1233.3 (15.8) 1537.5 (19.7)     Total Intake(mL/kg) 2133.3 (27.4) 2257.5 (29)     Urine (mL/kg/hr) 50 (0) 0 (0)     Stool 750 150 10    Total Output 800 150 10    Net +1333.3 +2107.5 -10           Urine Occurrence 4 x 7 x     Stool Occurrence 1 x  1 x          Physical Exam   Constitutional: He is oriented to person, place, and time.   Chronically ill   HENT:   Head: Normocephalic and atraumatic.   Neck: Normal range of motion. Neck supple.   Cardiovascular: Normal rate and regular rhythm.   Pulmonary/Chest: Effort normal.   Abdominal: Soft. Bowel sounds are normal.   Ileostomy with some skin irratition   Neurological: He is alert and oriented to person, place, and time.   Skin: Skin is warm.   Psychiatric: He has a normal mood and affect. His behavior is normal. Judgment and thought content normal.   Vitals reviewed.      Significant Labs:  CBC: No results for input(s): WBC, RBC, HGB, HCT, PLT, MCV, MCH, MCHC in the last 168 hours.  BMP:   Recent Labs   Lab 01/18/19  0442   GLU 95      K 4.2      CO2 20*   BUN 23   CREATININE 1.4   CALCIUM 9.2       Significant Diagnostics:  none    Assessment/Plan:     * Hyponatremia    resolved     Hyperkalemia    resolved     Alteration in skin integrity    Irritated skin around the ileostomy, wound care     Acute renal failure    Normal saline at 100 mL an hour  Nephrology consultation for consideration of hypertonic saline     Gastrointestinal anastomotic leak    Gastrografin enema shows that this has resolved.  Plan sigmoidoscopy later this week     Acute on chronic renal failure    Nephrology consultation normal saline at 100 mL an  hour, resolved     Ileostomy in place    This is likely contributing to his electrolytes fluid abnormalities.  Will discharged with re admission for ileostomy reversal on tuesday     Chronic systolic heart failure    Monitor     Biventricular ICD (implantable cardioverter-defibrillator) in place    Monitor with telemetry     CAD, multiple vessel    As cardiology re-evaluate the patient and comment on his risk for ileostomy reversal, from a cardiac point     CKD (chronic kidney disease) stage 4, GFR 15-29 ml/min    improved     Ischemic cardiomyopathy    Seen by Cardiology.  Patient has a moderate to high risk for cardiac events however there were no cardiac events the previous surgeries     Hypertension associated with diabetes    Home medications         Kevin Lindsay MD  General Surgery  [facility]

## 2019-01-18 NOTE — PLAN OF CARE
01/18/19 1136   Medicare Message   Important Message from Medicare regarding Discharge Appeal Rights Explained to patient/caregiver;Given to patient/caregiver;Signed/date by patient/caregiver   Date IMM was signed 01/18/19   Time IMM was signed 1139

## 2019-01-18 NOTE — ANESTHESIA RELEASE NOTE
"Anesthesia Release from PACU Note    Patient: Yan Choudhury    Procedure(s) Performed: Procedure(s) (LRB):  SIGMOIDOSCOPY, FLEXIBLE (N/A)    Anesthesia type: MAC    Post pain: Adequate analgesia    Post assessment: no apparent anesthetic complications, tolerated procedure well and no evidence of recall    Last Vitals:   Visit Vitals  /73 (BP Location: Left arm)   Pulse 94   Temp 36.7 °C (98.1 °F) (Temporal)   Resp (!) 21   Ht 5' 8" (1.727 m)   Wt 77.9 kg (171 lb 11.8 oz)   SpO2 99%   BMI 26.11 kg/m²       Post vital signs: stable    Level of consciousness: awake    Nausea/Vomiting: no nausea/no vomiting    Complications: none    Airway Patency: patent    Respiratory: unassisted, spontaneous ventilation, room air    Cardiovascular: stable and blood pressure at baseline    Hydration: euvolemic  "

## 2019-01-18 NOTE — ANESTHESIA PREPROCEDURE EVALUATION
01/18/2019  Yan Choudhury is a 63 y.o., male.    Anesthesia Evaluation    I have reviewed the Patient Summary Reports.    I have reviewed the Nursing Notes.   I have reviewed the Medications.     Review of Systems  Anesthesia Hx:  No problems with previous Anesthesia  Denies Family Hx of Anesthesia complications.    Social:  Non-Smoker, No Alcohol Use    Hematology/Oncology:  Hematology Normal   Oncology Normal     EENT/Dental:   Cataract  Glaucoma  Diabetic retinopathy   Cardiovascular:   Pacemaker Hypertension Denies MI. CAD    Denies CABG/stent. Dysrhythmias  CHF hyperlipidemia ECG has been reviewed. ekg 9/2018:  Atrial-sensed ventricular-paced rhythm 91  Biventricular pacemaker detected  Abnormal ECG  When compared with ECG of 21-DEC-2017 09:30    Echo 10/2018:   1 - Biatrial enlargement.     2 - Mild left ventricular enlargement.     3 - Eccentric hypertrophy.     4 - Wall motion abnormalities.     5 - Moderately depressed left ventricular systolic function (EF 30-35%).     6 - Restrictive LV filling pattern, indicating markedly elevated LAP (grade 3 diastolic dysfunction).     7 - Right ventricular enlargement with low normal systolic function.     8 - The estimated PA systolic pressure is 35 mmHg.     9 - Mild tricuspid regurgitation.     10 - Moderate pulmonic regurgitation.     11 - Mild to moderate aortic regurgitation.     Ischemic cardiomyopathy   Pulmonary:   Denies COPD.  Denies Asthma. Sleep Apnea, CPAP pulm htn   Renal/:   Chronic Renal Disease, CRI    Hepatic/GI:   GERD Denies Liver Disease. Denies Hepatitis.    Musculoskeletal:   Arthritis  gout   Neurological:   Denies CVA. Denies Seizures.    Endocrine:   Diabetes, type 2 Denies Hypothyroidism. Denies Hyperthyroidism.        Physical Exam  General:  Well nourished    Airway/Jaw/Neck:  Airway Findings: Mouth Opening: Normal Tongue: Normal   General Airway Assessment: Adult  Mallampati: II      Dental:  Dental Findings: In tact   Chest/Lungs:  Chest/Lungs Findings: Clear to auscultation, Normal Respiratory Rate     Heart/Vascular:  Heart Findings: Rate: Normal  Rhythm: Regular Rhythm             Anesthesia Plan  Type of Anesthesia, risks & benefits discussed:  Anesthesia Type:  MAC  Patient's Preference:   Intra-op Monitoring Plan: standard ASA monitors  Intra-op Monitoring Plan Comments:   Post Op Pain Control Plan:   Post Op Pain Control Plan Comments:   Induction:   IV  Beta Blocker:  Patient is on a Beta-Blocker and has received one dose within the past 24 hours (No further documentation required).       Informed Consent: Patient understands risks and agrees with Anesthesia plan.  Questions answered. Anesthesia consent signed with patient.  ASA Score: 3     Day of Surgery Review of History & Physical: I have interviewed and examined the patient. I have reviewed the patient's H&P dated:  There are no significant changes.  H&P update referred to the surgeon.         Ready For Surgery From Anesthesia Perspective.

## 2019-01-18 NOTE — TRANSFER OF CARE
"Anesthesia Transfer of Care Note    Patient: Yan Choudhury    Procedure(s) Performed: Procedure(s) (LRB):  SIGMOIDOSCOPY, FLEXIBLE (N/A)    Patient location: PACU    Anesthesia Type: MAC    Transport from OR: Transported from OR on room air with adequate spontaneous ventilation    Post pain: adequate analgesia    Post assessment: no apparent anesthetic complications and tolerated procedure well    Post vital signs: stable    Level of consciousness: awake    Nausea/Vomiting: no nausea/vomiting    Complications: none    Transfer of care protocol was followed      Last vitals:   Visit Vitals  /73 (BP Location: Left arm)   Pulse 94   Temp 36.7 °C (98.1 °F) (Temporal)   Resp (!) 21   Ht 5' 8" (1.727 m)   Wt 77.9 kg (171 lb 11.8 oz)   SpO2 99%   BMI 26.11 kg/m²     "

## 2019-01-18 NOTE — INTERVAL H&P NOTE
The patient has been examined and the H&P has been reviewed:    I concur with the findings and no changes have occurred since H&P was written. Patient requires flexible sigmoidoscopy to evaluate colorectal anastomosis that has been studied radiographically and apparently healed.    Anesthesia/Surgery risks, benefits and alternative options discussed and understood by patient/family.          Active Hospital Problems    Diagnosis  POA    *Hyponatremia [E87.1]  Yes    Chronic combined systolic and diastolic congestive heart failure [I50.42]  Yes    Hyperkalemia [E87.5]  Yes     Resolved      Acute renal failure [N17.9]  Yes     Admission  IV fluids  Repeat labs  Nephrology consultation      Alteration in skin integrity [R23.9]  Yes    Gastrointestinal anastomotic leak [K91.89]  Yes     Patient has a drain in place which is draining the area.  We will attempt at a 3 way stopcock for allow for flushing.  He also has protective ileostomy.    Continue antibiotics.    He will need repeat imaging studies in approximately 8 weeks.    If his condition deteriorates a CT scan will be done sooner any may need to have a colostomy and ileostomy takedown      Acute on chronic renal failure [N17.9, N18.9]  Yes    Ileostomy in place [Z93.2]  Not Applicable     Home health for support  Will need a reversal in the future      Chronic systolic heart failure [I50.22]  Yes    Biventricular ICD (implantable cardioverter-defibrillator) in place [Z95.810]  Yes     Chronic    Primary open-angle glaucoma, moderate stage [H40.1192]  Yes    CAD, multiple vessel [I25.10]  Yes     Chronic    CKD (chronic kidney disease) stage 4, GFR 15-29 ml/min [N18.4]  Yes    Ischemic cardiomyopathy [I25.5]  Yes    Hypertension associated with diabetes [E11.59, I10]  Yes      Resolved Hospital Problems   No resolved problems to display.

## 2019-01-18 NOTE — ASSESSMENT & PLAN NOTE
This is likely contributing to his electrolytes fluid abnormalities.  Will discharged with re admission for ileostomy reversal on tuesday

## 2019-01-18 NOTE — PROGRESS NOTES
[facility] -   Nephrology  Progress Note    Patient Name: Yan Choudhury  MRN: [id number]  Admission Date: 1/15/2019  Hospital Length of Stay: 3 days  Attending Provider: Kevin Lindsay MD   Primary Care Physician: Sandra Estevez MD  Principal Problem:Hyponatremia    Subjective:     HPI: Yan Choudhury is a 64 y/o AA man with history of hypertension, congestive heart failure, cardiomyopathy, chronic kidney disease stage 3, baseline creatinine about 1.5-2, chronic and mild hyponatremia, he had been having complications from his ileostomy, increased ileostomy outputs, was admitted to the hospital for abnormal labs including hyponatremia and worsening renal function, serum creatinine increased to 3.5 mg/dL and sodium worsened to 125, patient admitted for gentle hydration and once clinically stable to Plan reversal of his ileostomy, he has history of combined systolic and diastolic heart failure with ejection fraction of about 30%, mild hyperkalemia noted on his labs likely due to Ace inhibitors he was on prior to admission and also acute on chronic kidney failure.      Interval History:     1/18/19:  Patient is resting in bed without any complaints. He will be discharged today and return next week for ileostomy reversal.     Review of patient's allergies indicates:  No Known Allergies  Current Facility-Administered Medications   Medication Frequency    0.9%  NaCl infusion Continuous    acetaminophen tablet 650 mg Q8H PRN    allopurinol split tablet 50 mg Daily    bimatoprost 0.01 % ophthalmic drops 1 drop QHS    dextrose 50% injection 12.5 g PRN    dextrose 50% injection 25 g PRN    diphenoxylate-atropine 2.5-0.025 mg per tablet 1 tablet QID PRN    glucagon (human recombinant) injection 1 mg PRN    glucose chewable tablet 16 g PRN    glucose chewable tablet 24 g PRN    HYDROcodone-acetaminophen 5-325 mg per tablet 1 tablet Q6H PRN    influenza (FLUZONE HIGH-DOSE) vaccine 0.5 mL Prior to discharge     insulin aspart U-100 pen 1-10 Units QID (AC + HS) PRN    lidocaine (PF) 10 mg/ml (1%) injection 10 mg Once    ondansetron disintegrating tablet 8 mg Q8H PRN    promethazine (PHENERGAN) 6.25 mg in dextrose 5 % 50 mL IVPB Q6H PRN    ramelteon tablet 8 mg Nightly PRN    simvastatin tablet 10 mg QHS    sodium bicarbonate tablet 650 mg TID    sodium chloride 0.9% flush 10 mL Q6H    And    sodium chloride 0.9% flush 10 mL PRN       Objective:     Vital Signs (Most Recent):  Temp: 97.5 °F (36.4 °C) (01/18/19 0849)  Pulse: 109 (01/18/19 0928)  Resp: 18 (01/18/19 0928)  BP: 125/79 (01/18/19 0928)  SpO2: 99 % (01/18/19 0928)  O2 Device (Oxygen Therapy): room air (01/18/19 0928) Vital Signs (24h Range):  Temp:  [97.4 °F (36.3 °C)-98.6 °F (37 °C)] 97.5 °F (36.4 °C)  Pulse:  [] 109  Resp:  [14-23] 18  SpO2:  [97 %-100 %] 99 %  BP: (105-135)/(63-89) 125/79     Weight: 77.9 kg (171 lb 11.8 oz) (01/15/19 1142)  Body mass index is 26.11 kg/m².  Body surface area is 1.93 meters squared.    I/O last 3 completed shifts:  In: 3097.5 [P.O.:960; I.V.:2137.5]  Out: 650 [Urine:50; Stool:600]    Physical Exam   Constitutional: He is oriented to person, place, and time. He appears well-developed. He is cooperative.   HENT:   Head: Normocephalic.   Nose: No rhinorrhea.   Mouth/Throat: Mucous membranes are normal. No oropharyngeal exudate.   Eyes: Pupils are equal, round, and reactive to light.   Neck: No thyroid mass present.   Cardiovascular: Normal rate, regular rhythm, S1 normal, S2 normal and intact distal pulses.   Pulmonary/Chest: Effort normal. No respiratory distress. He has no wheezes.   Abdominal: Soft. Bowel sounds are normal. He exhibits no distension. There is no tenderness. No hernia.   Ileostomy bag in place.    Lymphadenopathy:     He has no cervical adenopathy.   Neurological: He is alert and oriented to person, place, and time.   Skin: Skin is warm and dry.   Psychiatric: He has a normal mood and affect.        Significant Labs:  Lab Results   Component Value Date    CREATININE 1.4 01/18/2019    BUN 23 01/18/2019     01/18/2019    K 4.2 01/18/2019     01/18/2019    CO2 20 (L) 01/18/2019     Lab Results   Component Value Date    .0 (H) 08/10/2018    CALCIUM 9.2 01/18/2019    PHOS 2.5 (L) 01/14/2019     Lab Results   Component Value Date    ALBUMIN 3.2 (L) 01/14/2019    ALBUMIN 3.2 (L) 01/14/2019     Lab Results   Component Value Date    WBC 9.45 12/27/2018    HGB 11.7 (L) 12/27/2018    HCT 36.4 (L) 12/27/2018    MCV 84 12/27/2018     12/27/2018     No results for input(s): MG in the last 168 hours.      Significant Imaging:          Assessment/Plan:     * Hyponatremia    Has resolved with mild hydration. Na at 136 today.      Acidosis, metabolic    Continue sodium bicarbonate.      CKD (chronic kidney disease) stage 4, GFR 15-29 ml/min    1. BROOKE on CKD stage 3/4 :  Acute kidney injury on chronic kidney disease  due to dehydration.   Renal function has improved with mild hydration and creatinine has declined to 1.4. Will continue mild hydration and monitor with repeat renal panel.                      Thank you for your consult. I will follow-up with patient. Please contact us if you have any additional questions.    Aubrey Seth MD  Nephrology  [facility] -

## 2019-01-18 NOTE — PLAN OF CARE
Problem: Adult Inpatient Plan of Care  Goal: Plan of Care Review  Outcome: Ongoing (interventions implemented as appropriate)  Patient remained free from injury. Family at bedside. NPO status. Blood glucose and Cardiac monitored NSR, No s/s of acute distress. 12hr chart check complete.

## 2019-01-18 NOTE — ANESTHESIA POSTPROCEDURE EVALUATION
"Anesthesia Post Evaluation    Patient: Yan Choudhury    Procedure(s) Performed: Procedure(s) (LRB):  SIGMOIDOSCOPY, FLEXIBLE (N/A)    Final Anesthesia Type: MAC  Patient location during evaluation: PACU  Patient participation: Yes- Able to Participate  Level of consciousness: awake  Post-procedure vital signs: reviewed and stable  Pain management: adequate  Airway patency: patent  PONV status at discharge: No PONV  Anesthetic complications: no      Cardiovascular status: blood pressure returned to baseline and hemodynamically stable  Respiratory status: unassisted, room air and spontaneous ventilation  Hydration status: euvolemic  Follow-up not needed.        Visit Vitals  /73 (BP Location: Left arm)   Pulse 94   Temp 36.7 °C (98.1 °F) (Temporal)   Resp (!) 21   Ht 5' 8" (1.727 m)   Wt 77.9 kg (171 lb 11.8 oz)   SpO2 99%   BMI 26.11 kg/m²       Pain/Tahir Score: No Data Recorded      "

## 2019-01-18 NOTE — ASSESSMENT & PLAN NOTE
1. BROOKE on CKD stage 3/4 :  Acute kidney injury on chronic kidney disease  due to dehydration.   Renal function has improved with mild hydration and creatinine has declined to 1.4. Will continue mild hydration and monitor with repeat renal panel.

## 2019-01-18 NOTE — DISCHARGE SUMMARY
[facility] -   General Surgery  Discharge Summary      Patient Name: Yan Choudhury  MRN: [id number]  Admission Date: 1/15/2019  Hospital Length of Stay: 3 days  Discharge Date and Time:  01/18/2019 10:53 AM  Attending Physician: Kevin Lindsay MD   Discharging Provider: Kevin Lindsay MD  Primary Care Provider: Sandra Estevez MD    HPI:   Patient is status post colostomy reversal.  He had a protective ileostomy done due to a leak. A Gastrografin enema showed no evidence of a leak. The drain fell out and was left out.    The patient has had issues with ileostomy output.  The ileostomy output ranges from 500-700 and occasionally 1400 mL a day.  This has created issues with renal failure and hyponatremia.  The patient is admitted now for correction of his hyponatremia and renal failure.  We will check his nutritional status.  Consideration will be given to ileostomy reversal during this admission.  Will ask Cardiology to see him to re-evaluate his cardiac risk says he has been at an increased risk for surgical procedures in the past    Procedure(s) (LRB):  SIGMOIDOSCOPY, FLEXIBLE (N/A)      Indwelling Lines/Drains at time of discharge:   Lines/Drains/Airways     Peripherally Inserted Central Catheter Line                 PICC Double Lumen 01/15/19 2240 right brachial 2 days          Drain                 Ileostomy 10/30/18 Loop RUQ 80 days         Closed/Suction Drain 10/30/18 1300 Abdomen Bulb 79 days              Hospital Course: Patient was admitted with hyponatremia and acute renal failure.  He was started on IV fluids after a PICC line was placed.  Nephrology and Cardiology were asked to evaluate the patient.    01/16/2019.  Patient had a PICC line placed last night he was started on IV fluids.  He has a new bag on his ileostomy with less leakage    1/1719:  Feeling better, increased by acceptable risk for ileostomy closure    1//8/19:  For Flex sig today, will send home with home health, can be re  admitted on Tuesday for ileostomy reversal      Consults:   Consults (From admission, onward)        Status Ordering Provider     Inpatient consult to Nephrology  Once     Provider:  (Not yet assigned)    Completed RICARDO OCONNOR     [facility]  Once     Provider:  (Not yet assigned)    Acknowledged RICARDO OCONNOR     Inpatient consult to Social Work/Case Management  Once     Provider:  (Not yet assigned)    Acknowledged RICARDO OCONNOR          Significant Diagnostic Studies: Labs:   BMP:   Recent Labs   Lab 01/16/19 1820 01/17/19  0529 01/18/19  0442    106 95   * 132* 136   K 4.7 4.5 4.2   CL 99 104 108   CO2 23 20* 20*   BUN 42* 37* 23   CREATININE 2.2* 1.9* 1.4   CALCIUM 9.4 9.2 9.2   , CMP   Recent Labs   Lab 01/16/19 1820 01/17/19  0529 01/18/19  0442   * 132* 136   K 4.7 4.5 4.2   CL 99 104 108   CO2 23 20* 20*    106 95   BUN 42* 37* 23   CREATININE 2.2* 1.9* 1.4   CALCIUM 9.4 9.2 9.2   ANIONGAP 8 8 8   ESTGFRAFRICA 35* 42* >60   EGFRNONAA 30* 36* 52*    and CBC No results for input(s): WBC, HGB, HCT, PLT in the last 48 hours.    Pending Diagnostic Studies:     Procedure Component Value Units Date/Time    Basic metabolic panel [[id number]] Collected:  01/16/19 0555    Order Status:  Sent Lab Status:  In process Updated:  01/16/19 0555    Specimen:  Blood     Narrative:       Collection has been rescheduled by SPT at 01/16/2019 05:18 Reason:   Nurse Draw        Final Active Diagnoses:    Diagnosis Date Noted POA    PRINCIPAL PROBLEM:  Acute on chronic renal failure [N17.9, N18.9] 11/15/2018 Yes    Acidosis, metabolic [E87.2] 01/18/2019 Yes    Chronic combined systolic and diastolic congestive heart failure [I50.42] 01/15/2019 Yes    Hyperkalemia [E87.5] 12/29/2018 Yes    Hyponatremia [E87.1] 12/28/2018 Yes    Acute renal failure [N17.9] 12/12/2018 Yes    Alteration in skin integrity [R23.9] 12/12/2018 Yes    Gastrointestinal anastomotic  leak [K91.89] 11/16/2018 Yes    Ileostomy in place [Z93.2] 11/05/2018 Not Applicable    Chronic systolic heart failure [I50.22] 03/06/2018 Yes    Biventricular ICD (implantable cardioverter-defibrillator) in place [Z95.810] 04/19/2016 Yes     Chronic    Primary open-angle glaucoma, moderate stage [H40.1192] 10/13/2015 Yes    CAD, multiple vessel [I25.10] 07/13/2015 Yes     Chronic    CKD (chronic kidney disease) stage 4, GFR 15-29 ml/min [N18.4] 05/22/2015 Yes    Ischemic cardiomyopathy [I25.5]  Yes    Hypertension associated with diabetes [E11.59, I10]  Yes      Problems Resolved During this Admission:        Colonoscopy showed some disuse colitis but a patent and area of anastomosis      Discharged Condition: stable    Disposition: Home or Self Care    Follow Up:  Follow-up Information     Kevin Lindsay MD On 1/22/2019.    Specialty:  General Surgery  Why:  Please report to outpatient surgery at approximately 9:30 a.m. to 10:00 a.m. for planned surgery  Contact information:  [address]  [place] [address] [phone]                 Patient Instructions:      Notify your health care provider if you experience any of the following:  temperature >100.4     Notify your health care provider if you experience any of the following:  persistent nausea and vomiting or diarrhea     Notify your health care provider if you experience any of the following:  severe uncontrolled pain     Notify your health care provider if you experience any of the following:  redness, tenderness, or signs of infection (pain, swelling, redness, odor or green/yellow discharge around incision site)     Activity as tolerated     Medications:  Reconciled Home Medications:      Medication List      START taking these medications    diphenoxylate-atropine 2.5-0.025 mg 2.5-0.025 mg per tablet  Commonly known as:  LOMOTIL  Take 1 tablet by mouth 4 (four) times daily as needed for Diarrhea.        CHANGE how you take these  "medications    allopurinol 100 MG tablet  Commonly known as:  ZYLOPRIM  TAKE 1 TABLET (100 MG TOTAL) BY MOUTH ONCE DAILY.  What changed:    · how much to take  · how to take this  · when to take this     bimatoprost 0.03 % ophthalmic drops  Commonly known as:  LUMIGAN  Place 1 drop into the left eye every evening.  What changed:  how to take this     bumetanide 2 MG tablet  Commonly known as:  BUMEX  Take 1 tablet (2 mg total) by mouth 2 (two) times daily.  What changed:  when to take this     lisinopril 5 MG tablet  Commonly known as:  PRINIVIL,ZESTRIL  TAKE 1 TABLET (5 MG TOTAL) BY MOUTH ONCE DAILY.  What changed:  when to take this        CONTINUE taking these medications    aspirin 81 MG EC tablet  Commonly known as:  ECOTRIN  Take 81 mg by mouth every morning.     BD INSULIN SYRINGE ULTRA-FINE 0.5 mL 31 gauge x 5/16" Syrg  Generic drug:  insulin syringe-needle U-100  USE AS DIRECTED TO INJECT INSULIN TWO TIMES DAILY     blood sugar diagnostic Strp  1 strip by Misc.(Non-Drug; Combo Route) route 4 (four) times daily. [employer]     carvedilol 25 MG tablet  Commonly known as:  COREG  TAKE 1/2 TABLET BY MOUTH TWICE A DAY WITH MEALS     GERITOL ORAL  Take by mouth.     HYDROcodone-acetaminophen 5-325 mg per tablet  Commonly known as:  NORCO  One or 2 every 4-6 hours as needed for pain     insulin glargine 100 unit/mL injection  Commonly known as:  LANTUS  50 units bid prn when BS< 150     lancets Misc  For tel care     pantoprazole 40 MG tablet  Commonly known as:  PROTONIX  TAKE 1 TABLET BY MOUTH EVERY DAY FOR ACID REFLUX     pen needle, diabetic 32 gauge x 5/32" Ndle  Commonly known as:  BD ULTRA-FINE JOSLYN PEN NEEDLE  1 each by Misc.(Non-Drug; Combo Route) route 4 (four) times daily.     simvastatin 10 MG tablet  Commonly known as:  ZOCOR  TAKE 1 TABLET (10 MG TOTAL) BY MOUTH EVERY EVENING.     sodium bicarbonate 650 MG tablet  Take 3 tablets (1,950 mg total) by mouth 3 (three) times daily.     VITAMIN D3 1,000 unit " capsule  Generic drug:  cholecalciferol (vitamin D3)  Take 1,000 Units by mouth once daily.          Time spent on the discharge of patient: 10 minutes    Kevin Lindsay MD  General Surgery  [facility] -

## 2019-01-18 NOTE — SUBJECTIVE & OBJECTIVE
Interval History: for flex sig today    Medications:  Continuous Infusions:   sodium chloride 0.9% 50 mL/hr at 01/17/19 1238     Scheduled Meds:   allopurinol  50 mg Oral Daily    bimatoprost  1 drop Left Eye QHS    lidocaine (PF) 10 mg/ml (1%)  1 mL Other Once    simvastatin  10 mg Oral QHS    sodium bicarbonate  650 mg Oral TID    sodium chloride 0.9%  10 mL Intravenous Q6H     PRN Meds:acetaminophen, dextrose 50%, dextrose 50%, diphenoxylate-atropine 2.5-0.025 mg, glucagon (human recombinant), glucose, glucose, HYDROcodone-acetaminophen, influenza, insulin aspart U-100, ondansetron, promethazine (PHENERGAN) IVPB, ramelteon, Flushing PICC Protocol **AND** sodium chloride 0.9% **AND** sodium chloride 0.9%     Review of patient's allergies indicates:  No Known Allergies  Objective:     Vital Signs (Most Recent):  Temp: 98.1 °F (36.7 °C) (01/18/19 0756)  Pulse: 94 (01/18/19 0756)  Resp: (!) 21 (01/18/19 0756)  BP: 112/73 (01/18/19 0756)  SpO2: 99 % (01/18/19 0756) Vital Signs (24h Range):  Temp:  [97.4 °F (36.3 °C)-98.6 °F (37 °C)] 98.1 °F (36.7 °C)  Pulse:  [] 94  Resp:  [16-21] 21  SpO2:  [97 %-100 %] 99 %  BP: (105-119)/(63-77) 112/73     Weight: 77.9 kg (171 lb 11.8 oz)  Body mass index is 26.11 kg/m².    Intake/Output - Last 3 Shifts       01/16 0700 - 01/17 0659 01/17 0700 - 01/18 0659 01/18 0700 - 01/19 0659    P.O. 900 720     I.V. (mL/kg) 1233.3 (15.8) 1537.5 (19.7)     Total Intake(mL/kg) 2133.3 (27.4) 2257.5 (29)     Urine (mL/kg/hr) 50 (0) 0 (0)     Stool 750 150 10    Total Output 800 150 10    Net +1333.3 +2107.5 -10           Urine Occurrence 4 x 7 x     Stool Occurrence 1 x  1 x          Physical Exam   Constitutional: He is oriented to person, place, and time.   Chronically ill   HENT:   Head: Normocephalic and atraumatic.   Neck: Normal range of motion. Neck supple.   Cardiovascular: Normal rate and regular rhythm.   Pulmonary/Chest: Effort normal.   Abdominal: Soft. Bowel sounds are  normal.   Ileostomy with some skin irratition   Neurological: He is alert and oriented to person, place, and time.   Skin: Skin is warm.   Psychiatric: He has a normal mood and affect. His behavior is normal. Judgment and thought content normal.   Vitals reviewed.      Significant Labs:  CBC: No results for input(s): WBC, RBC, HGB, HCT, PLT, MCV, MCH, MCHC in the last 168 hours.  BMP:   Recent Labs   Lab 01/18/19  0442   GLU 95      K 4.2      CO2 20*   BUN 23   CREATININE 1.4   CALCIUM 9.2       Significant Diagnostics:  none

## 2019-01-18 NOTE — PROGRESS NOTES
[facility] -   General Surgery  Progress Note    Subjective:     History of Present Illness:  Patient is status post colostomy reversal.  He had a protective ileostomy done due to a leak. A Gastrografin enema showed no evidence of a leak. The drain fell out and was left out.    The patient has had issues with ileostomy output.  The ileostomy output ranges from 500-700 and occasionally 1400 mL a day.  This has created issues with renal failure and hyponatremia.  The patient is admitted now for correction of his hyponatremia and renal failure.  We will check his nutritional status.  Consideration will be given to ileostomy reversal during this admission.  Will ask Cardiology to see him to re-evaluate his cardiac risk says he has been at an increased risk for surgical procedures in the past    Post-Op Info:  Procedure(s) (LRB):  SIGMOIDOSCOPY, FLEXIBLE (N/A)   Day of Surgery     Interval History:   Feeling better, labs improved, for ilestomy reversal Tuesday,  Sigmoidoscopy tomorrow  Medications:  Continuous Infusions:   sodium chloride 0.9% 100 mL/hr at 01/16/19 1140     Scheduled Meds:   allopurinol  50 mg Oral Daily    bimatoprost  1 drop Left Eye QHS    lidocaine (PF) 10 mg/ml (1%)  1 mL Other Once    simvastatin  10 mg Oral QHS    sodium bicarbonate  650 mg Oral TID    sodium chloride 0.9%  10 mL Intravenous Q6H     PRN Meds:acetaminophen, dextrose 50%, dextrose 50%, glucagon (human recombinant), glucose, glucose, HYDROcodone-acetaminophen, [START ON 1/18/2019] influenza, insulin aspart U-100, ondansetron, promethazine (PHENERGAN) IVPB, ramelteon, Flushing PICC Protocol **AND** sodium chloride 0.9% **AND** sodium chloride 0.9%     Review of patient's allergies indicates:  No Known Allergies  Objective:     Vital Signs (Most Recent):  Temp: 97.9 °F (36.6 °C) (01/17/19 0454)  Pulse: 98 (01/17/19 0529)  Resp: 18 (01/17/19 0454)  BP: 124/69 (01/17/19 0454)  SpO2: 98 % (01/17/19 0454) Vital Signs  (24h Range):  Temp:  [97.5 °F (36.4 °C)-98 °F (36.7 °C)] 97.9 °F (36.6 °C)  Pulse:  [] 98  Resp:  [16-20] 18  SpO2:  [97 %-98 %] 98 %  BP: ()/(63-73) 124/69     Weight: 77.9 kg (171 lb 11.8 oz)  Body mass index is 26.11 kg/m².    Intake/Output - Last 3 Shifts       01/15 0700 - 01/16 0659 01/16 0700 - 01/17 0659 01/17 0700 - 01/18 0659    P.O. 480 900     I.V. (mL/kg)  1233.3 (15.8) 700 (9)    Total Intake(mL/kg) 480 (6.2) 2133.3 (27.4) 700 (9)    Urine (mL/kg/hr) 350 50 (0)     Stool 300 750     Total Output 650 800     Net -170 +1333.3 +700           Urine Occurrence  4 x     Stool Occurrence  1 x           Physical Exam   Constitutional:   Chronically ill   HENT:   Head: Normocephalic and atraumatic.   Neck: Normal range of motion. Neck supple.   Cardiovascular: Normal rate and regular rhythm.   Pulmonary/Chest: Effort normal and breath sounds normal.   Abdominal: Soft.   Ileostomy  Skin irration around ilesotmy   Musculoskeletal: He exhibits no edema.   Neurological: He is alert.   Skin: Skin is warm and dry. Capillary refill takes less than 2 seconds.   Psychiatric: He has a normal mood and affect. His behavior is normal. Judgment and thought content normal.   Vitals reviewed.      Significant Labs:  CBC: No results for input(s): WBC, RBC, HGB, HCT, PLT, MCV, MCH, MCHC in the last 168 hours.  BMP:   Recent Labs   Lab 01/17/19  0529      *   K 4.5      CO2 20*   BUN 37*   CREATININE 1.9*   CALCIUM 9.2     CMP:   Recent Labs   Lab 01/14/19  1440  01/17/19  0529   *  167*   < > 106   CALCIUM 10.0  10.0   < > 9.2   ALBUMIN 3.2*  3.2*  --   --    PROT 7.8  --   --    *  125*   < > 132*   K 4.8  4.8   < > 4.5   CO2 25  25   < > 20*   CL 92*  92*   < > 104   BUN 59*  59*   < > 37*   CREATININE 3.6*  3.6*   < > 1.9*   ALKPHOS 104  --   --    ALT 36  --   --    AST 33  --   --    BILITOT 0.5  --   --     < > = values in this interval not displayed.       Significant  Diagnostics:  none    Assessment/Plan:     * Hyponatremia    Normal saline at 100 mL an hour for the last Nephrology to see consider the use of hypertonic saline     Hyperkalemia    Monitor     Alteration in skin integrity    Irritated skin around the ileostomy, wound care     Acute renal failure    Normal saline at 100 mL an hour  Nephrology consultation for consideration of hypertonic saline     Gastrointestinal anastomotic leak    Gastrografin enema shows that this has resolved.  Plan sigmoidoscopy later this week     Acute on chronic renal failure    Nephrology consultation normal saline at 100 mL an hour     Ileostomy in place    This is likely contributing to his electrolytes fluid abnormalities.  Will consider ileostomy reversal this admission, likely next week.    Wound care consult for ileostomy care     Chronic systolic heart failure    Monitor     Biventricular ICD (implantable cardioverter-defibrillator) in place    Monitor with telemetry     CAD, multiple vessel    As cardiology re-evaluate the patient and comment on his risk for ileostomy reversal, from a cardiac point     Ischemic cardiomyopathy    Seen by Cardiology.  Patient has a moderate to high risk for cardiac events however there were no cardiac events the previous surgeries     Hypertension associated with diabetes    Home medications         Kevin Lindsay MD  General Surgery  [facility]

## 2019-01-21 ENCOUNTER — ANESTHESIA EVENT (OUTPATIENT)
Dept: SURGERY | Facility: HOSPITAL | Age: 64
DRG: 330 | End: 2019-01-21
Payer: MEDICARE

## 2019-01-21 NOTE — PRE ADMISSION SCREENING
Pre op instructions reviewed with patient's wife per phone:    To confirm, Your surgeon has instructed you:  Surgery is scheduled 1/22/19 at 11:15 am.  Pre admit office to call afternoon prior to surgery with final arrival time.  If surgery is on Monday, Pre admit office to call Friday afternoon with with final arrival time      Please report to [facility] 1st floor main lobby by 9:45 am.      INSTRUCTIONS IMPORTANT!!!  ¨ No smoking after 12 midnight, the night before surgery.  ¨ No solid food after 12 midnight, but you may have clear liquids up until 3 hours prior to surgery.  This includes: grape, cranberry, and apple juice (not orange, and no coffee.)   ¨ OK to brush teeth, but no gum, candy or mints!    ¨ Take only these medicines with a small swallow of water-morning of surgery.  Lisinopril, protonix    ____  Do not wear makeup, including mascara.  ____  No powder, lotions or creams to surgical area.  ____  Please remove all jewelry, including piercings and leave at home.  ____  No money or valuables needed. Please leave at home.  ____  Please bring identification and insurance information to hospital.  ____  If going home the same day, arrange for a ride home. You will not be able to   drive if Anesthesia was used.  ____  Children, under 12 years old, must remain in the waiting room with an adult.  They are not allowed in patient areas.  ____  Wear loose fitting clothing. Allow for dressings, bandages.  ____  Stop Aspirin, Ibuprofen, Motrin and Aleve at least 5-7 days before surgery, unless otherwise instructed by your doctor, or the nurse.   You MAY use Tylenol/acetaminophen until day of surgery.  ____  If you take diabetic medication, do not take am of surgery unless instructed by   Doctor.  ____ Stop taking any Fish Oil supplement or any Vitamins that contain Vitamin E at least 5 days prior to surgery.          Bathing Instructions-- The night before surgery and the morning prior to  coming to the hospital:   -Do not shave the surgical area.   -Shower and wash your hair and body as usual with your regular soap and shampoo.   -Rinse your hair and body completely.   -Use one packet of hibiclens to wash the surgical site (using your hand) gently for 5 minutes.  Do not scrub you skin too hard.   -Do not use hibiclens on your head, face, or genitals.   -Do not wash with regular soap after you use the hibiclens.   -Rinse your body thoroughly.   -Dry with clean, soft towel.  Do not use lotion, cream, deodorant, or powders on   the surgical site.    Use antibacterial soap in place of hibiclens if your surgery is on the head, face or genitals.         Surgical Site Infection    Prevention of surgical site infections:     -Keep incisions clean and dry.   -Do not soak/submerge incisions in water until completely healed.   -Do not apply lotions, powders, creams, or deodorants to site.   -Always make sure hands are cleaned with antibacterial soap/ alcohol-based   prior to touching the surgical site.  (This includes doctors, nurses, staff, and yourself.)    Signs and symptoms:   -Redness and pain around the area where you had surgery   -Drainage of cloudy fluid from your surgical wound   -Fever over 100.4  I have read or had read and explained to me, and understand the above information.

## 2019-01-22 ENCOUNTER — ANESTHESIA (OUTPATIENT)
Dept: SURGERY | Facility: HOSPITAL | Age: 64
DRG: 330 | End: 2019-01-22
Payer: MEDICARE

## 2019-01-22 ENCOUNTER — HOSPITAL ENCOUNTER (INPATIENT)
Facility: HOSPITAL | Age: 64
LOS: 3 days | Discharge: HOME HEALTH | DRG: 330 | End: 2019-01-25
Attending: SURGERY | Admitting: SURGERY
Payer: MEDICARE

## 2019-01-22 DIAGNOSIS — I50.22 CHRONIC SYSTOLIC HEART FAILURE: ICD-10-CM

## 2019-01-22 DIAGNOSIS — E87.1 HYPONATREMIA: ICD-10-CM

## 2019-01-22 DIAGNOSIS — E78.5 HYPERLIPIDEMIA ASSOCIATED WITH TYPE 2 DIABETES MELLITUS: ICD-10-CM

## 2019-01-22 DIAGNOSIS — I25.10 CAD, MULTIPLE VESSEL: Chronic | ICD-10-CM

## 2019-01-22 DIAGNOSIS — E87.20 METABOLIC ACIDOSIS: ICD-10-CM

## 2019-01-22 DIAGNOSIS — Z95.810 BIVENTRICULAR ICD (IMPLANTABLE CARDIOVERTER-DEFIBRILLATOR) IN PLACE: Chronic | ICD-10-CM

## 2019-01-22 DIAGNOSIS — E11.69 HYPERLIPIDEMIA ASSOCIATED WITH TYPE 2 DIABETES MELLITUS: ICD-10-CM

## 2019-01-22 DIAGNOSIS — E11.59 HYPERTENSION ASSOCIATED WITH DIABETES: Primary | ICD-10-CM

## 2019-01-22 DIAGNOSIS — Z93.2 ILEOSTOMY IN PLACE: ICD-10-CM

## 2019-01-22 DIAGNOSIS — N18.30 CKD (CHRONIC KIDNEY DISEASE) STAGE 3, GFR 30-59 ML/MIN: ICD-10-CM

## 2019-01-22 DIAGNOSIS — I25.5 ISCHEMIC CARDIOMYOPATHY: ICD-10-CM

## 2019-01-22 DIAGNOSIS — K21.9 GASTROESOPHAGEAL REFLUX DISEASE, ESOPHAGITIS PRESENCE NOT SPECIFIED: ICD-10-CM

## 2019-01-22 DIAGNOSIS — I15.2 HYPERTENSION ASSOCIATED WITH DIABETES: Primary | ICD-10-CM

## 2019-01-22 LAB
ANION GAP SERPL CALC-SCNC: 11 MMOL/L
BUN SERPL-MCNC: 19 MG/DL
CALCIUM SERPL-MCNC: 9.4 MG/DL
CHLORIDE SERPL-SCNC: 104 MMOL/L
CO2 SERPL-SCNC: 23 MMOL/L
CREAT SERPL-MCNC: 1.9 MG/DL
EST. GFR  (AFRICAN AMERICAN): 42 ML/MIN/1.73 M^2
EST. GFR  (NON AFRICAN AMERICAN): 36 ML/MIN/1.73 M^2
GLUCOSE SERPL-MCNC: 95 MG/DL
POCT GLUCOSE: 100 MG/DL (ref 70–110)
POCT GLUCOSE: 89 MG/DL (ref 70–110)
POTASSIUM SERPL-SCNC: 3.9 MMOL/L
SODIUM SERPL-SCNC: 138 MMOL/L

## 2019-01-22 PROCEDURE — 94760 N-INVAS EAR/PLS OXIMETRY 1: CPT

## 2019-01-22 PROCEDURE — 37000008 HC ANESTHESIA 1ST 15 MINUTES: Performed by: SURGERY

## 2019-01-22 PROCEDURE — 99900031 HC PATIENT EDUCATION (STAT)

## 2019-01-22 PROCEDURE — C9290 INJ, BUPIVACAINE LIPOSOME: HCPCS | Performed by: SURGERY

## 2019-01-22 PROCEDURE — 63600175 PHARM REV CODE 636 W HCPCS: Performed by: NURSE ANESTHETIST, CERTIFIED REGISTERED

## 2019-01-22 PROCEDURE — 21400001 HC TELEMETRY ROOM

## 2019-01-22 PROCEDURE — 25000003 PHARM REV CODE 250: Performed by: SURGERY

## 2019-01-22 PROCEDURE — 94799 UNLISTED PULMONARY SVC/PX: CPT

## 2019-01-22 PROCEDURE — 36000706: Performed by: SURGERY

## 2019-01-22 PROCEDURE — 44625 REPAIR BOWEL OPENING: CPT | Mod: 58,,, | Performed by: SURGERY

## 2019-01-22 PROCEDURE — 88304 TISSUE SPECIMEN TO PATHOLOGY - SURGERY: ICD-10-PCS | Mod: 26,,, | Performed by: PATHOLOGY

## 2019-01-22 PROCEDURE — 25000003 PHARM REV CODE 250: Performed by: NURSE ANESTHETIST, CERTIFIED REGISTERED

## 2019-01-22 PROCEDURE — 36000707: Performed by: SURGERY

## 2019-01-22 PROCEDURE — 44625 REPAIR BOWEL OPENING: CPT | Mod: 58,80,, | Performed by: COLON & RECTAL SURGERY

## 2019-01-22 PROCEDURE — 63600175 PHARM REV CODE 636 W HCPCS: Performed by: SURGERY

## 2019-01-22 PROCEDURE — S0030 INJECTION, METRONIDAZOLE: HCPCS | Performed by: SURGERY

## 2019-01-22 PROCEDURE — 88304 TISSUE EXAM BY PATHOLOGIST: CPT | Performed by: PATHOLOGY

## 2019-01-22 PROCEDURE — 27201423 OPTIME MED/SURG SUP & DEVICES STERILE SUPPLY: Performed by: SURGERY

## 2019-01-22 PROCEDURE — 88304 TISSUE EXAM BY PATHOLOGIST: CPT | Mod: 26,,, | Performed by: PATHOLOGY

## 2019-01-22 PROCEDURE — 80048 BASIC METABOLIC PNL TOTAL CA: CPT

## 2019-01-22 PROCEDURE — 37000009 HC ANESTHESIA EA ADD 15 MINS: Performed by: SURGERY

## 2019-01-22 PROCEDURE — 71000033 HC RECOVERY, INTIAL HOUR: Performed by: SURGERY

## 2019-01-22 PROCEDURE — 11000001 HC ACUTE MED/SURG PRIVATE ROOM

## 2019-01-22 PROCEDURE — 71000039 HC RECOVERY, EACH ADD'L HOUR: Performed by: SURGERY

## 2019-01-22 PROCEDURE — 44625 PR CLOSE ENTEROSTOMY,RESEC+ANAST: ICD-10-PCS | Mod: 58,,, | Performed by: SURGERY

## 2019-01-22 PROCEDURE — 44625 PR CLOSE ENTEROSTOMY,RESEC+ANAST: ICD-10-PCS | Mod: 58,80,, | Performed by: COLON & RECTAL SURGERY

## 2019-01-22 RX ORDER — GLUCAGON 1 MG
1 KIT INJECTION
Status: DISCONTINUED | OUTPATIENT
Start: 2019-01-22 | End: 2019-01-25 | Stop reason: HOSPADM

## 2019-01-22 RX ORDER — NEOSTIGMINE METHYLSULFATE 1 MG/ML
INJECTION, SOLUTION INTRAVENOUS
Status: DISCONTINUED | OUTPATIENT
Start: 2019-01-22 | End: 2019-01-22

## 2019-01-22 RX ORDER — PROPOFOL 10 MG/ML
VIAL (ML) INTRAVENOUS
Status: DISCONTINUED | OUTPATIENT
Start: 2019-01-22 | End: 2019-01-22

## 2019-01-22 RX ORDER — SIMVASTATIN 5 MG/1
10 TABLET, FILM COATED ORAL NIGHTLY
Status: DISCONTINUED | OUTPATIENT
Start: 2019-01-22 | End: 2019-01-25 | Stop reason: HOSPADM

## 2019-01-22 RX ORDER — CHLORHEXIDINE GLUCONATE ORAL RINSE 1.2 MG/ML
10 SOLUTION DENTAL 2 TIMES DAILY
Status: DISCONTINUED | OUTPATIENT
Start: 2019-01-22 | End: 2019-01-25 | Stop reason: HOSPADM

## 2019-01-22 RX ORDER — SODIUM CHLORIDE 0.9 % (FLUSH) 0.9 %
3 SYRINGE (ML) INJECTION EVERY 8 HOURS
Status: DISCONTINUED | OUTPATIENT
Start: 2019-01-22 | End: 2019-01-22 | Stop reason: HOSPADM

## 2019-01-22 RX ORDER — MEPERIDINE HYDROCHLORIDE 50 MG/ML
12.5 INJECTION INTRAMUSCULAR; INTRAVENOUS; SUBCUTANEOUS ONCE AS NEEDED
Status: DISCONTINUED | OUTPATIENT
Start: 2019-01-22 | End: 2019-01-22 | Stop reason: HOSPADM

## 2019-01-22 RX ORDER — MORPHINE SULFATE 4 MG/ML
2 INJECTION, SOLUTION INTRAMUSCULAR; INTRAVENOUS EVERY 5 MIN PRN
Status: DISCONTINUED | OUTPATIENT
Start: 2019-01-22 | End: 2019-01-22 | Stop reason: HOSPADM

## 2019-01-22 RX ORDER — BUMETANIDE 1 MG/1
2 TABLET ORAL EVERY MORNING
Status: DISCONTINUED | OUTPATIENT
Start: 2019-01-23 | End: 2019-01-25 | Stop reason: HOSPADM

## 2019-01-22 RX ORDER — ROCURONIUM BROMIDE 10 MG/ML
INJECTION, SOLUTION INTRAVENOUS
Status: DISCONTINUED | OUTPATIENT
Start: 2019-01-22 | End: 2019-01-22

## 2019-01-22 RX ORDER — ENOXAPARIN SODIUM 100 MG/ML
40 INJECTION SUBCUTANEOUS EVERY 24 HOURS
Status: DISCONTINUED | OUTPATIENT
Start: 2019-01-23 | End: 2019-01-25 | Stop reason: HOSPADM

## 2019-01-22 RX ORDER — PHENYLEPHRINE HYDROCHLORIDE 10 MG/ML
INJECTION INTRAVENOUS
Status: DISCONTINUED | OUTPATIENT
Start: 2019-01-22 | End: 2019-01-22

## 2019-01-22 RX ORDER — SODIUM CHLORIDE, SODIUM LACTATE, POTASSIUM CHLORIDE, CALCIUM CHLORIDE 600; 310; 30; 20 MG/100ML; MG/100ML; MG/100ML; MG/100ML
INJECTION, SOLUTION INTRAVENOUS CONTINUOUS PRN
Status: DISCONTINUED | OUTPATIENT
Start: 2019-01-22 | End: 2019-01-22

## 2019-01-22 RX ORDER — OXYCODONE HYDROCHLORIDE 5 MG/1
5 TABLET ORAL
Status: DISCONTINUED | OUTPATIENT
Start: 2019-01-22 | End: 2019-01-22 | Stop reason: HOSPADM

## 2019-01-22 RX ORDER — PANTOPRAZOLE SODIUM 40 MG/1
40 TABLET, DELAYED RELEASE ORAL DAILY
Status: DISCONTINUED | OUTPATIENT
Start: 2019-01-22 | End: 2019-01-25 | Stop reason: HOSPADM

## 2019-01-22 RX ORDER — BUPIVACAINE HYDROCHLORIDE 2.5 MG/ML
INJECTION, SOLUTION EPIDURAL; INFILTRATION; INTRACAUDAL
Status: DISCONTINUED | OUTPATIENT
Start: 2019-01-22 | End: 2019-01-22 | Stop reason: HOSPADM

## 2019-01-22 RX ORDER — LIDOCAINE HCL/PF 100 MG/5ML
SYRINGE (ML) INTRAVENOUS
Status: DISCONTINUED | OUTPATIENT
Start: 2019-01-22 | End: 2019-01-22

## 2019-01-22 RX ORDER — GLYCOPYRROLATE 0.2 MG/ML
INJECTION INTRAMUSCULAR; INTRAVENOUS
Status: DISCONTINUED | OUTPATIENT
Start: 2019-01-22 | End: 2019-01-22

## 2019-01-22 RX ORDER — DIPHENHYDRAMINE HCL 25 MG
25 CAPSULE ORAL EVERY 6 HOURS PRN
Status: DISCONTINUED | OUTPATIENT
Start: 2019-01-22 | End: 2019-01-25 | Stop reason: HOSPADM

## 2019-01-22 RX ORDER — SODIUM CHLORIDE 9 MG/ML
INJECTION, SOLUTION INTRAVENOUS CONTINUOUS
Status: DISCONTINUED | OUTPATIENT
Start: 2019-01-22 | End: 2019-01-24

## 2019-01-22 RX ORDER — METOCLOPRAMIDE HYDROCHLORIDE 5 MG/ML
10 INJECTION INTRAMUSCULAR; INTRAVENOUS EVERY 10 MIN PRN
Status: DISCONTINUED | OUTPATIENT
Start: 2019-01-22 | End: 2019-01-22 | Stop reason: HOSPADM

## 2019-01-22 RX ORDER — ASPIRIN 81 MG/1
81 TABLET ORAL EVERY MORNING
Status: DISCONTINUED | OUTPATIENT
Start: 2019-01-23 | End: 2019-01-25 | Stop reason: HOSPADM

## 2019-01-22 RX ORDER — ONDANSETRON 8 MG/1
8 TABLET, ORALLY DISINTEGRATING ORAL EVERY 8 HOURS PRN
Status: DISCONTINUED | OUTPATIENT
Start: 2019-01-22 | End: 2019-01-25 | Stop reason: HOSPADM

## 2019-01-22 RX ORDER — HYDROMORPHONE HYDROCHLORIDE 1 MG/ML
1 INJECTION, SOLUTION INTRAMUSCULAR; INTRAVENOUS; SUBCUTANEOUS EVERY 4 HOURS PRN
Status: DISCONTINUED | OUTPATIENT
Start: 2019-01-22 | End: 2019-01-25 | Stop reason: HOSPADM

## 2019-01-22 RX ORDER — ONDANSETRON 8 MG/1
8 TABLET, ORALLY DISINTEGRATING ORAL EVERY 8 HOURS PRN
Status: DISCONTINUED | OUTPATIENT
Start: 2019-01-22 | End: 2019-01-22 | Stop reason: HOSPADM

## 2019-01-22 RX ORDER — FENTANYL CITRATE 50 UG/ML
INJECTION, SOLUTION INTRAMUSCULAR; INTRAVENOUS
Status: DISCONTINUED | OUTPATIENT
Start: 2019-01-22 | End: 2019-01-22

## 2019-01-22 RX ORDER — MIDAZOLAM HYDROCHLORIDE 1 MG/ML
INJECTION, SOLUTION INTRAMUSCULAR; INTRAVENOUS
Status: DISCONTINUED | OUTPATIENT
Start: 2019-01-22 | End: 2019-01-22

## 2019-01-22 RX ORDER — DIPHENOXYLATE HYDROCHLORIDE AND ATROPINE SULFATE 2.5; .025 MG/1; MG/1
1 TABLET ORAL 3 TIMES DAILY
COMMUNITY
End: 2019-04-02

## 2019-01-22 RX ORDER — METRONIDAZOLE 500 MG/100ML
500 INJECTION, SOLUTION INTRAVENOUS
Status: COMPLETED | OUTPATIENT
Start: 2019-01-22 | End: 2019-01-22

## 2019-01-22 RX ORDER — ALLOPURINOL 100 MG/1
100 TABLET ORAL DAILY
Status: DISCONTINUED | OUTPATIENT
Start: 2019-01-22 | End: 2019-01-25 | Stop reason: HOSPADM

## 2019-01-22 RX ORDER — ONDANSETRON 2 MG/ML
INJECTION INTRAMUSCULAR; INTRAVENOUS
Status: DISCONTINUED | OUTPATIENT
Start: 2019-01-22 | End: 2019-01-22

## 2019-01-22 RX ORDER — SODIUM BICARBONATE 650 MG/1
1950 TABLET ORAL 3 TIMES DAILY
Status: DISCONTINUED | OUTPATIENT
Start: 2019-01-22 | End: 2019-01-25 | Stop reason: HOSPADM

## 2019-01-22 RX ORDER — LISINOPRIL 5 MG/1
5 TABLET ORAL DAILY
Status: DISCONTINUED | OUTPATIENT
Start: 2019-01-22 | End: 2019-01-25 | Stop reason: HOSPADM

## 2019-01-22 RX ORDER — SODIUM CHLORIDE 0.9 % (FLUSH) 0.9 %
3 SYRINGE (ML) INJECTION
Status: DISCONTINUED | OUTPATIENT
Start: 2019-01-22 | End: 2019-01-25 | Stop reason: HOSPADM

## 2019-01-22 RX ORDER — INSULIN ASPART 100 [IU]/ML
1-10 INJECTION, SOLUTION INTRAVENOUS; SUBCUTANEOUS EVERY 6 HOURS PRN
Status: DISCONTINUED | OUTPATIENT
Start: 2019-01-22 | End: 2019-01-25 | Stop reason: HOSPADM

## 2019-01-22 RX ORDER — HYDROCODONE BITARTRATE AND ACETAMINOPHEN 5; 325 MG/1; MG/1
1 TABLET ORAL EVERY 6 HOURS PRN
Status: DISCONTINUED | OUTPATIENT
Start: 2019-01-22 | End: 2019-01-25 | Stop reason: HOSPADM

## 2019-01-22 RX ADMIN — PHENYLEPHRINE HYDROCHLORIDE 200 MCG: 10 INJECTION INTRAVENOUS at 12:01

## 2019-01-22 RX ADMIN — SODIUM CHLORIDE: 0.9 INJECTION, SOLUTION INTRAVENOUS at 05:01

## 2019-01-22 RX ADMIN — METRONIDAZOLE 500 MG: 500 SOLUTION INTRAVENOUS at 12:01

## 2019-01-22 RX ADMIN — MIDAZOLAM 2 MG: 1 INJECTION INTRAMUSCULAR; INTRAVENOUS at 12:01

## 2019-01-22 RX ADMIN — PANTOPRAZOLE SODIUM 40 MG: 40 TABLET, DELAYED RELEASE ORAL at 05:01

## 2019-01-22 RX ADMIN — HYDROMORPHONE HYDROCHLORIDE 1 MG: 1 INJECTION, SOLUTION INTRAMUSCULAR; INTRAVENOUS; SUBCUTANEOUS at 05:01

## 2019-01-22 RX ADMIN — ROBINUL 0.4 MG: 0.2 INJECTION INTRAMUSCULAR; INTRAVENOUS at 01:01

## 2019-01-22 RX ADMIN — SODIUM BICARBONATE 650 MG TABLET 1950 MG: at 09:01

## 2019-01-22 RX ADMIN — LISINOPRIL 5 MG: 5 TABLET ORAL at 05:01

## 2019-01-22 RX ADMIN — SODIUM CHLORIDE, SODIUM LACTATE, POTASSIUM CHLORIDE, AND CALCIUM CHLORIDE: 600; 310; 30; 20 INJECTION, SOLUTION INTRAVENOUS at 12:01

## 2019-01-22 RX ADMIN — FENTANYL CITRATE 100 MCG: 50 INJECTION, SOLUTION INTRAMUSCULAR; INTRAVENOUS at 12:01

## 2019-01-22 RX ADMIN — SODIUM BICARBONATE 650 MG TABLET 1950 MG: at 05:01

## 2019-01-22 RX ADMIN — PROPOFOL 70 MG: 10 INJECTION, EMULSION INTRAVENOUS at 12:01

## 2019-01-22 RX ADMIN — BIMATOPROST 1 DROP: 0.1 SOLUTION/ DROPS OPHTHALMIC at 09:01

## 2019-01-22 RX ADMIN — ROCURONIUM BROMIDE 50 MG: 10 INJECTION, SOLUTION INTRAVENOUS at 12:01

## 2019-01-22 RX ADMIN — CEFTRIAXONE 2 G: 2 INJECTION, SOLUTION INTRAVENOUS at 12:01

## 2019-01-22 RX ADMIN — LIDOCAINE HYDROCHLORIDE 100 MG: 20 INJECTION, SOLUTION INTRAVENOUS at 12:01

## 2019-01-22 RX ADMIN — ALLOPURINOL 100 MG: 100 TABLET ORAL at 05:01

## 2019-01-22 RX ADMIN — CHLORHEXIDINE GLUCONATE 10 ML: 1.2 RINSE ORAL at 09:01

## 2019-01-22 RX ADMIN — SIMVASTATIN 10 MG: 5 TABLET, FILM COATED ORAL at 09:01

## 2019-01-22 RX ADMIN — ONDANSETRON 4 MG: 2 INJECTION, SOLUTION INTRAMUSCULAR; INTRAVENOUS at 01:01

## 2019-01-22 RX ADMIN — NEOSTIGMINE METHYLSULFATE 3 MG: 1 INJECTION INTRAVENOUS at 01:01

## 2019-01-22 NOTE — ANESTHESIA POSTPROCEDURE EVALUATION
"Anesthesia Post Evaluation    Patient: Yan Choudhury    Procedure(s) Performed: Procedure(s) (LRB):  CLOSURE, ILEOSTOMY (N/A)    Final Anesthesia Type: general  Patient location during evaluation: PACU  Patient participation: Yes- Able to Participate  Level of consciousness: awake and alert  Post-procedure vital signs: reviewed and stable  Pain management: adequate  Airway patency: patent  PONV status at discharge: No PONV  Anesthetic complications: no      Cardiovascular status: blood pressure returned to baseline  Respiratory status: unassisted  Hydration status: euvolemic  Follow-up not needed.        Visit Vitals  /74 (Patient Position: Lying)   Pulse 99   Temp 36.6 °C (97.8 °F) (Oral)   Resp 17   Ht 5' 8" (1.727 m)   Wt 78.7 kg (173 lb 8 oz)   SpO2 100%   BMI 26.38 kg/m²       Pain/Tahir Score: Tahir Score: 10 (1/22/2019  2:57 PM)        "

## 2019-01-22 NOTE — OP NOTE
[facility]  Surgery Department  Operative Note    SUMMARY     Date of Procedure: 1/22/2019     Procedure: Procedure(s) (LRB):  CLOSURE, ILEOSTOMY (N/A)     Surgeon(s) and Role:     * Kevin Lindsay MD - Primary     * Quinn Aragon MD - Assisting        Pre-Operative Diagnosis: Ileostomy in place [Z93.2]    Post-Operative Diagnosis: Post-Op Diagnosis Codes:     * Ileostomy in place [Z93.2]    Anesthesia: General    Technical Procedures Used:  Open ileostomy reversal    Description of the Findings of the Procedure:  Ileostomy reversal    The patient was brought into the operative room placed on the operative table supine position.  General endotracheal anesthesia was induced. IV antibiotics were administered.  Pneumatic compression devices were placed on the lower extremities.  Guzman catheter was inserted.  The abdomen was clipped, prepped and draped in a standard manner.    A time-out was performed.    A vertical incision was made above his ileostomy and then below was instilled ileostomy.  Subcutaneous tissues were dissected using electrocautery until the wall of the ileostomy was identified. The ileostomy was then excised circumferentially from the surrounding subcutaneous tissue.  The incision was lengthened inferiorly.  The fascia was identified and the loop ileostomy was then freed from its adherence to the fashion then the perineum.  The loop ileostomy was completely freed circumferentially.  One serosal tear was closed with interrupted 3 0 silk sutures in a Lembert fashion x2.    The ileostomy that had been sewn closed was then opened.  The limbs of a ANGELA 75 stapler were inserted and a side-to-side anastomosis was created.  There was no bleeding noted.  The opening created by the side-to-side anastomosis was then closed with another firing of the ANGELA 75 stapler.  The suture lines were over sewed with 3 0 silk in a Lembert fashion.  The junction of the anastomosis was reinforced with 3  0 silk in interrupted fashion.    The anastomosis was reduced into the abdominal cavity.    Marcaine and Exparel were infiltrated.  The perineum was closed with 0 Vicryl in a running fashion.  The fascia was closed with 1.  PDS in a running fashion.  The proximal and distal aspects of the wound were closed with staples.  The middle aspect of the wound, where the ileostomy protruded, was packed with gauze.    Dry sterile dressings were placed.    Sponge and instrument were reported to be correct    Significant Surgical Tasks Conducted by the Assistant(s), if Applicable:  Assistance with the entire procedure    Complications: No    Estimated Blood Loss (EBL): * 20 mL  Marcaine 0.25% 30 mL  Exparel 266 mg  IV fluids approximately 800 mL        Implants: * No implants in log *    Specimens:   Specimen (12h ago, onward)    Start     Ordered    01/22/19 1400  Specimen to Pathology - Surgery  Once     Comments:  Ileum from ileostomyDx:  ileostomy     Start Status   01/22/19 1400 Collected (01/22/19 1401)       01/22/19 1400                  Condition: Stable    Disposition: PACU - hemodynamically stable.    Attestation: I performed the procedure.

## 2019-01-22 NOTE — ANESTHESIA PREPROCEDURE EVALUATION
01/22/2019  Yan Choudhury is a 63 y.o., male.    Anesthesia Evaluation    I have reviewed the Patient Summary Reports.    I have reviewed the Nursing Notes.   I have reviewed the Medications.     Review of Systems  Anesthesia Hx:  No problems with previous Anesthesia  History of prior surgery of interest to airway management or planning: Denies Family Hx of Anesthesia complications.   Denies Personal Hx of Anesthesia complications.   Social:  Non-Smoker    Hematology/Oncology:         -- Anemia:   EENT/Dental:   Diabetic retinopathy   Cardiovascular:   Hypertension CAD  Dysrhythmias  CHF ECG has been reviewed. Pulmonary HTN    ICD    Echo 10/18  CONCLUSIONS     1 - Biatrial enlargement.     2 - Mild left ventricular enlargement.     3 - Eccentric hypertrophy.     4 - Wall motion abnormalities.     5 - Moderately depressed left ventricular systolic function (EF 30-35%).     6 - Restrictive LV filling pattern, indicating markedly elevated LAP (grade 3 diastolic dysfunction).     7 - Right ventricular enlargement with low normal systolic function.     8 - The estimated PA systolic pressure is 35 mmHg.     9 - Mild tricuspid regurgitation.     10 - Moderate pulmonic regurgitation.     11 - Mild to moderate aortic regurgitation.        Pulmonary:   Sleep Apnea, CPAP pulm htn   Renal/:   Chronic Renal Disease, CRI    Hepatic/GI:   GERD    Musculoskeletal:   gout   Endocrine:   Diabetes, type 2    Psych:  Psychiatric Normal              Anesthesia Plan  Type of Anesthesia, risks & benefits discussed:  Anesthesia Type:  general  Patient's Preference:   Intra-op Monitoring Plan: standard ASA monitors and arterial line  Intra-op Monitoring Plan Comments:   Post Op Pain Control Plan: multimodal analgesia  Post Op Pain Control Plan Comments:   Induction:   IV  Beta Blocker:  Patient is not currently on a Beta-Blocker  (No further documentation required).       Informed Consent: Patient understands risks and agrees with Anesthesia plan.  Questions answered. Anesthesia consent signed with patient.  ASA Score: 3     Day of Surgery Review of History & Physical: I have interviewed and examined the patient. I have reviewed the patient's H&P dated:  There are no significant changes.          Ready For Surgery From Anesthesia Perspective.

## 2019-01-22 NOTE — PLAN OF CARE
Pt resting on stretcher s/p Ileostomy closure performed under general anesthesia. Respirations even and unlabored on 98% face tent with O2 sats of 100%. Abd dsg remains c/d/i. VSS. Will cont to monitor. See flow sheet for detailed assessment.

## 2019-01-22 NOTE — INTERVAL H&P NOTE
Reason For Visit  CHERI NUNES is a(n) established patient here today for a chief complaint of . not feeling well since last week; he was seen in the office on 11/5 for sore throat, ear pain, red eye. strep cx was negative. he had bloody nose over the weekend, 2 bloody noses last night.   Patient accompanied by father .      History of Present Illness    Has been sick for about 5 days  Seen and had negative strep test  Started like sore throat and red eye  mild runny nose and congestion was present  eye is now better with drops (antihistamine)    sore throat continues  bloody nose 3 episodes over past 5 days  some mild congestion at night not severe  not blowing nose much at all    hurts to swallow   more tired    he has not been sick like this in the past so concerned as it has been so long (5 days).      Review of Systems    All other systems reviewed and negative.      Allergies  No Known Drug Allergies    Current Meds  DiazePAM 10 MG Rectal Gel; 10MG PER RECTUM FOR SEIZURE LASTING 5 MINUTES  OR LONGER;  Therapy: 19Mar2015 to (Evaluate:[id number]); Last Rx:15Jan2018 Ordered  OXcarbazepine 300 MG/5ML Oral Suspension; TAKE 10 ML BY MOUTH TWICE DAILY AS  DIRECTED FOR 30 DAYS. DOSE INCREASE;  Therapy: 19Mar2015 to (Evaluate:20Mar2019)  Requested for: [id number]; Last  Rx:[id number] Ordered    Active Problems  Acute pharyngitis (J02.9)  ADD (attention deficit disorder) (F98.8)  Allergic conjunctivitis of right eye (H10.11)  Asperger's disorder (F84.5)  Autism (F84.0)  Complex partial seizure (G40.209)  Generalized convulsive seizure (R56.9)    Social History  Caregiver  Composition Of Household ___ Sisters  Educational Level Grade  Living With Parents Living Together  Never a smoker  Travel to [facility]  He lives with his mother, father and sister. His parents are .      Vitals  Vital Signs    Recorded: 08Nov2018 09:06AM   Weight 145.9 lb    2-20 Weight Percentile 81 %   Systolic 96, LUE, Sitting   Diastolic 62, LUE,  The patient has been examined and the H&P has been reviewed:    I concur with the findings and changes have been noted since the H&P was written: From his last admission the patient was discharged once his electrolytes were corrected.  He presents for ileostomy reversal at this time     Labs from today show no significant electrolyte abnormalities    Anesthesia/Surgery risks, benefits and alternative options discussed and understood by patient/family.          Active Hospital Problems    Diagnosis  POA    Ileostomy in place [Z93.2]  Not Applicable     Home health for support  Will need a reversal in the future        Resolved Hospital Problems   No resolved problems to display.      Sitting   Temperature 98 F, Temporal   Heart Rate 90   Respiration 20   O2 Saturation 97     Physical Exam  Constitutional: well developed, well nourished, in no acute distress, interactive and current vital signs reviewed.   Head and Face: atraumatic, no deformities, normocephalic.   Eyes: no discharge and no eyelid swelling . mild injection of conjunctiva.   ENT: normal appearing outer ear and normal appearing nose. examination of the tympanic membrane showed normal landmarks. no nasal discharge. some redness in OP/ tonsil is about 2+/ no exudate.   Neck: normal appearing neck.   Lymphatic: no lymphadenopathy.   Pulmonary: no respiratory distress, normal respiratory rate and effort and no accessory muscle use. breath sounds clear to auscultation bilaterally.   Cardiovascular: normal rate and no murmurs were heard.   Psychiatric: alert and awake, interactive and mood/affect were appropriate for age.   Skin: no rash.        Immunizations   ** Printed in Appendix #1 below.     Discussion/Summary    Impression:. sore throat persistent day 5  had neg RST  bloody noses can be due to irritation in nose.   Information discussed with father and patient   All questions answered. Parent verbalized understanding.    Child discharged to home.            Assessment   1. Acute pharyngitis (J02.9)   2. Epistaxis (R04.0)   3. Acute URI (J06.9)    Plan  Orders    · prc MONOSPOT, IN-OFFICE; Status:Complete;   Done: 2018 09:39AM    monospot today is negative  looks to be viral cause  ayr saline gel to nose each day  continue present management  can expect this to last 10-14 days  if worse or persistent call back.      Signatures   Electronically signed by : Connie Mariano R.N.; 2018  9:09AM CST    Electronically signed by : MARCELO RALPH M.D.; Nov 10 2018 12:50PM CST    Electronically signed by : MARCELO RALPH M.D.; Nov 10 2018 12:51PM CST    Appendix #1     Patient: CHERI NUNES; : 2006; MRN:  [id number]      1 2 3 4 5 6    DTP/DTaP  2004  (41d) 2004  (4m) 2004  (5m) 13-Apr-2005  (15m) 11-Jan-2008  (4y)     Hepatitis A  21-Jan-2011  (7y) 25-Feb-2012  (8y)        Hepatitis B  2004  (4m) 2004  (8m) 06-Jul-2005  (18m)       HIB  2004  (41d) 2004  (4m) 2004  (5m) 13-Apr-2005  (15m)      HPV  20-Feb-2017  (13y) 15-Misha-2018  (14y)        Influenza  2004  (8m) 2004  (10m) 31-Oct-2005  (21m) 02-Oct-2008  (4y) 19-Nov-2009  (5y) 04-Jan-2013  (9y)    Meningococcal  27-Feb-2015  (11y)         MMR  05-Jan-2005  (12m) 24-Jan-2009  (5y)        Pneumo Other  2004  (2m) 2004  (4m) 2004  (5m) 13-Apr-2005  (15m)      Polio  2004  (2m) 2004  (5m) 05-Jan-2005  (12m) 11-Jan-2008  (4y)      PPD  09-Jan-2006  (2y) 24-Jan-2009  (5y)        Tdap  20-Jan-2014  (10y)         Varicella  13-Apr-2005  (15m) 24-Jan-2009  (5y)

## 2019-01-22 NOTE — ANESTHESIA RELEASE NOTE
"Anesthesia Release from PACU Note    Patient: Yan Choudhury    Procedure(s) Performed: Procedure(s) (LRB):  CLOSURE, ILEOSTOMY (N/A)    Anesthesia type: general    Post pain: Adequate analgesia    Post assessment: no apparent anesthetic complications    Last Vitals:   Visit Vitals  BP (!) 107/56   Pulse 97   Temp 36.6 °C (97.9 °F) (Temporal)   Resp 18   Ht 5' 8" (1.727 m)   Wt 78.7 kg (173 lb 8 oz)   SpO2 99%   BMI 26.38 kg/m²       Post vital signs: stable    Level of consciousness: awake    Nausea/Vomiting: no nausea/no vomiting    Complications: none    Airway Patency: patent    Respiratory: unassisted    Cardiovascular: stable and blood pressure at baseline    Hydration: euvolemic  "

## 2019-01-22 NOTE — TRANSFER OF CARE
"Anesthesia Transfer of Care Note    Patient: Yan Choudhury    Procedure(s) Performed: Procedure(s) (LRB):  CLOSURE, ILEOSTOMY (N/A)    Patient location: PACU    Anesthesia Type: general    Transport from OR: Transported from OR on room air with adequate spontaneous ventilation    Post pain: adequate analgesia    Post assessment: no apparent anesthetic complications    Post vital signs: stable    Level of consciousness: awake    Nausea/Vomiting: no nausea/vomiting    Complications: none    Transfer of care protocol was followed      Last vitals:   Visit Vitals  BP (!) 107/56   Pulse 97   Temp 36.6 °C (97.9 °F) (Temporal)   Resp 18   Ht 5' 8" (1.727 m)   Wt 78.7 kg (173 lb 8 oz)   SpO2 99%   BMI 26.38 kg/m²     "

## 2019-01-23 PROBLEM — N18.30 CKD (CHRONIC KIDNEY DISEASE) STAGE 3, GFR 30-59 ML/MIN: Status: ACTIVE | Noted: 2019-01-23

## 2019-01-23 LAB
ALBUMIN SERPL BCP-MCNC: 2.5 G/DL
ANION GAP SERPL CALC-SCNC: 10 MMOL/L
ANION GAP SERPL CALC-SCNC: 10 MMOL/L
ANISOCYTOSIS BLD QL SMEAR: ABNORMAL
BASOPHILS # BLD AUTO: 0.02 K/UL
BASOPHILS NFR BLD: 0.2 %
BUN SERPL-MCNC: 21 MG/DL
BUN SERPL-MCNC: 21 MG/DL
CALCIUM SERPL-MCNC: 8.2 MG/DL
CALCIUM SERPL-MCNC: 8.2 MG/DL
CHLORIDE SERPL-SCNC: 107 MMOL/L
CHLORIDE SERPL-SCNC: 107 MMOL/L
CO2 SERPL-SCNC: 20 MMOL/L
CO2 SERPL-SCNC: 20 MMOL/L
CREAT SERPL-MCNC: 1.9 MG/DL
CREAT SERPL-MCNC: 1.9 MG/DL
DACRYOCYTES BLD QL SMEAR: ABNORMAL
DIFFERENTIAL METHOD: ABNORMAL
EOSINOPHIL # BLD AUTO: 0.1 K/UL
EOSINOPHIL NFR BLD: 1.4 %
ERYTHROCYTE [DISTWIDTH] IN BLOOD BY AUTOMATED COUNT: 17.4 %
EST. GFR  (AFRICAN AMERICAN): 42 ML/MIN/1.73 M^2
EST. GFR  (AFRICAN AMERICAN): 42 ML/MIN/1.73 M^2
EST. GFR  (NON AFRICAN AMERICAN): 36 ML/MIN/1.73 M^2
EST. GFR  (NON AFRICAN AMERICAN): 36 ML/MIN/1.73 M^2
GLUCOSE SERPL-MCNC: 70 MG/DL
GLUCOSE SERPL-MCNC: 70 MG/DL
HCT VFR BLD AUTO: 29.8 %
HGB BLD-MCNC: 9.4 G/DL
LYMPHOCYTES # BLD AUTO: 1.3 K/UL
LYMPHOCYTES NFR BLD: 14 %
MCH RBC QN AUTO: 26.3 PG
MCHC RBC AUTO-ENTMCNC: 31.5 G/DL
MCV RBC AUTO: 84 FL
MONOCYTES # BLD AUTO: 0.7 K/UL
MONOCYTES NFR BLD: 7.7 %
NEUTROPHILS # BLD AUTO: 7 K/UL
NEUTROPHILS NFR BLD: 76.9 %
OVALOCYTES BLD QL SMEAR: ABNORMAL
PHOSPHATE SERPL-MCNC: 3.7 MG/DL
PLATELET # BLD AUTO: 175 K/UL
PLATELET BLD QL SMEAR: ABNORMAL
PMV BLD AUTO: ABNORMAL FL
POCT GLUCOSE: 131 MG/DL (ref 70–110)
POCT GLUCOSE: 78 MG/DL (ref 70–110)
POCT GLUCOSE: 85 MG/DL (ref 70–110)
POCT GLUCOSE: 94 MG/DL (ref 70–110)
POIKILOCYTOSIS BLD QL SMEAR: ABNORMAL
POTASSIUM SERPL-SCNC: 4.1 MMOL/L
POTASSIUM SERPL-SCNC: 4.1 MMOL/L
RBC # BLD AUTO: 3.57 M/UL
SCHISTOCYTES BLD QL SMEAR: PRESENT
SODIUM SERPL-SCNC: 137 MMOL/L
SODIUM SERPL-SCNC: 137 MMOL/L
WBC # BLD AUTO: 9.08 K/UL

## 2019-01-23 PROCEDURE — 63600175 PHARM REV CODE 636 W HCPCS: Mod: JG | Performed by: SURGERY

## 2019-01-23 PROCEDURE — 99223 PR INITIAL HOSPITAL CARE,LEVL III: ICD-10-PCS | Mod: ,,, | Performed by: INTERNAL MEDICINE

## 2019-01-23 PROCEDURE — 94799 UNLISTED PULMONARY SVC/PX: CPT

## 2019-01-23 PROCEDURE — 99900035 HC TECH TIME PER 15 MIN (STAT)

## 2019-01-23 PROCEDURE — 21400001 HC TELEMETRY ROOM

## 2019-01-23 PROCEDURE — 99223 1ST HOSP IP/OBS HIGH 75: CPT | Mod: ,,, | Performed by: INTERNAL MEDICINE

## 2019-01-23 PROCEDURE — 63600175 PHARM REV CODE 636 W HCPCS: Performed by: SURGERY

## 2019-01-23 PROCEDURE — 80069 RENAL FUNCTION PANEL: CPT

## 2019-01-23 PROCEDURE — 25000003 PHARM REV CODE 250: Performed by: SURGERY

## 2019-01-23 PROCEDURE — 85025 COMPLETE CBC W/AUTO DIFF WBC: CPT

## 2019-01-23 RX ADMIN — SODIUM BICARBONATE 650 MG TABLET 1950 MG: at 09:01

## 2019-01-23 RX ADMIN — ALLOPURINOL 100 MG: 100 TABLET ORAL at 09:01

## 2019-01-23 RX ADMIN — BIMATOPROST 1 DROP: 0.1 SOLUTION/ DROPS OPHTHALMIC at 09:01

## 2019-01-23 RX ADMIN — SODIUM CHLORIDE: 0.9 INJECTION, SOLUTION INTRAVENOUS at 01:01

## 2019-01-23 RX ADMIN — CHLORHEXIDINE GLUCONATE 10 ML: 1.2 RINSE ORAL at 09:01

## 2019-01-23 RX ADMIN — SIMVASTATIN 10 MG: 5 TABLET, FILM COATED ORAL at 09:01

## 2019-01-23 RX ADMIN — PANTOPRAZOLE SODIUM 40 MG: 40 TABLET, DELAYED RELEASE ORAL at 09:01

## 2019-01-23 RX ADMIN — BUMETANIDE 2 MG: 1 TABLET ORAL at 06:01

## 2019-01-23 RX ADMIN — ASPIRIN 81 MG: 81 TABLET, COATED ORAL at 06:01

## 2019-01-23 RX ADMIN — ENOXAPARIN SODIUM 40 MG: 100 INJECTION SUBCUTANEOUS at 07:01

## 2019-01-23 RX ADMIN — ALTEPLASE 2 MG: 2.2 INJECTION, POWDER, LYOPHILIZED, FOR SOLUTION INTRAVENOUS at 05:01

## 2019-01-23 RX ADMIN — SODIUM CHLORIDE: 0.9 INJECTION, SOLUTION INTRAVENOUS at 12:01

## 2019-01-23 RX ADMIN — LISINOPRIL 5 MG: 5 TABLET ORAL at 09:01

## 2019-01-23 RX ADMIN — SODIUM BICARBONATE 650 MG TABLET 1950 MG: at 07:01

## 2019-01-23 RX ADMIN — HYDROCODONE BITARTRATE AND ACETAMINOPHEN 1 TABLET: 5; 325 TABLET ORAL at 01:01

## 2019-01-23 NOTE — SUBJECTIVE & OBJECTIVE
Interval History:  Incisional pain after ileostomy reversal.  No nausea.  Will start on clear liquids    Medications:  Continuous Infusions:   sodium chloride 0.9% 100 mL/hr at 01/23/19 1224     Scheduled Meds:   allopurinol  100 mg Oral Daily    aspirin  81 mg Oral QAM    bimatoprost  1 drop Left Eye QHS    bumetanide  2 mg Oral QAM    chlorhexidine  10 mL Mouth/Throat BID    enoxaparin  40 mg Subcutaneous Daily    lisinopril  5 mg Oral Daily    nozaseptin   Each Nare BID    pantoprazole  40 mg Oral Daily    simvastatin  10 mg Oral QHS    sodium bicarbonate  1,950 mg Oral TID     PRN Meds:dextrose 50%, diphenhydrAMINE, glucagon (human recombinant), HYDROcodone-acetaminophen, HYDROmorphone, insulin aspart U-100, ondansetron, sodium chloride 0.9%     Review of patient's allergies indicates:  No Known Allergies  Objective:     Vital Signs (Most Recent):  Temp: 99.3 °F (37.4 °C) (01/23/19 1150)  Pulse: 104 (01/23/19 1150)  Resp: 18 (01/23/19 1150)  BP: 113/64 (01/23/19 1150)  SpO2: 95 % (01/23/19 1150) Vital Signs (24h Range):  Temp:  [97.7 °F (36.5 °C)-99.3 °F (37.4 °C)] 99.3 °F (37.4 °C)  Pulse:  [] 104  Resp:  [12-20] 18  SpO2:  [94 %-100 %] 95 %  BP: ()/(56-74) 113/64     Weight: 78.7 kg (173 lb 8 oz)  Body mass index is 26.38 kg/m².    Intake/Output - Last 3 Shifts       01/21 0700 - 01/22 0659 01/22 0700 - 01/23 0659 01/23 0700 - 01/24 0659    P.O.  360 200    I.V. (mL/kg)  1300 (16.5)     Total Intake(mL/kg)  1660 (21.1) 200 (2.5)    Urine (mL/kg/hr)   250 (0.5)    Stool   0    Total Output   250    Net  +1660 -50           Stool Occurrence   1 x          Physical Exam   Constitutional: He is oriented to person, place, and time. No distress.   Chronically ill   HENT:   Head: Normocephalic and atraumatic.   Eyes: No scleral icterus.   Neck: Normal range of motion. Neck supple.   Cardiovascular: Normal rate, regular rhythm and normal heart sounds.   Pulmonary/Chest: Effort normal and breath  sounds normal.   Abdominal: Soft. Bowel sounds are normal. He exhibits no distension. Tenderness: Incisional.   The incision is clean.  The open area in the center was packed   Musculoskeletal: He exhibits no edema.   Neurological: He is alert and oriented to person, place, and time.   Skin: Skin is warm and dry. Capillary refill takes less than 2 seconds.   Psychiatric: He has a normal mood and affect. His behavior is normal. Judgment and thought content normal.   Nursing note and vitals reviewed.      Significant Labs:  CBC:   Recent Labs   Lab 01/23/19  0600   WBC 9.08   RBC 3.57*   HGB 9.4*   HCT 29.8*      MCV 84   MCH 26.3*   MCHC 31.5*     BMP:   Recent Labs   Lab 01/23/19  0600   GLU 70  70     137   K 4.1  4.1     107   CO2 20*  20*   BUN 21  21   CREATININE 1.9*  1.9*   CALCIUM 8.2*  8.2*       Significant Diagnostics:  None

## 2019-01-23 NOTE — PROGRESS NOTES
[facility] -   General Surgery  Progress Note    Subjective:     History of Present Illness:  Patient is status post colostomy reversal.  He had a protective ileostomy done due to a leak. A Gastrografin enema showed no evidence of a leak. The drain fell out and was left out.     The patient has had issues with ileostomy output.  The ileostomy output ranges from 500-700 and occasionally 1400 mL a day.  This has created issues with renal failure and hyponatremia.  The patient is admitted now for correction of his hyponatremia and renal failure.  We will check his nutritional status.  Consideration will be given to ileostomy reversal during this admission.  Will ask Cardiology to see him to re-evaluate his cardiac risk says he has been at an increased risk for surgical procedures in the past    He presents now for ileostomy reversal        Post-Op Info:  Procedure(s) (LRB):  CLOSURE, ILEOSTOMY (N/A)   1 Day Post-Op     Interval History:  Incisional pain after ileostomy reversal.  No nausea.  Will start on clear liquids    Medications:  Continuous Infusions:   sodium chloride 0.9% 100 mL/hr at 01/23/19 1224     Scheduled Meds:   allopurinol  100 mg Oral Daily    aspirin  81 mg Oral QAM    bimatoprost  1 drop Left Eye QHS    bumetanide  2 mg Oral QAM    chlorhexidine  10 mL Mouth/Throat BID    enoxaparin  40 mg Subcutaneous Daily    lisinopril  5 mg Oral Daily    nozaseptin   Each Nare BID    pantoprazole  40 mg Oral Daily    simvastatin  10 mg Oral QHS    sodium bicarbonate  1,950 mg Oral TID     PRN Meds:dextrose 50%, diphenhydrAMINE, glucagon (human recombinant), HYDROcodone-acetaminophen, HYDROmorphone, insulin aspart U-100, ondansetron, sodium chloride 0.9%     Review of patient's allergies indicates:  No Known Allergies  Objective:     Vital Signs (Most Recent):  Temp: 99.3 °F (37.4 °C) (01/23/19 1150)  Pulse: 104 (01/23/19 1150)  Resp: 18 (01/23/19 1150)  BP: 113/64 (01/23/19 1150)  SpO2:  95 % (01/23/19 1150) Vital Signs (24h Range):  Temp:  [97.7 °F (36.5 °C)-99.3 °F (37.4 °C)] 99.3 °F (37.4 °C)  Pulse:  [] 104  Resp:  [12-20] 18  SpO2:  [94 %-100 %] 95 %  BP: ()/(56-74) 113/64     Weight: 78.7 kg (173 lb 8 oz)  Body mass index is 26.38 kg/m².    Intake/Output - Last 3 Shifts       01/21 0700 - 01/22 0659 01/22 0700 - 01/23 0659 01/23 0700 - 01/24 0659    P.O.  360 200    I.V. (mL/kg)  1300 (16.5)     Total Intake(mL/kg)  1660 (21.1) 200 (2.5)    Urine (mL/kg/hr)   250 (0.5)    Stool   0    Total Output   250    Net  +1660 -50           Stool Occurrence   1 x          Physical Exam   Constitutional: He is oriented to person, place, and time. No distress.   Chronically ill   HENT:   Head: Normocephalic and atraumatic.   Eyes: No scleral icterus.   Neck: Normal range of motion. Neck supple.   Cardiovascular: Normal rate, regular rhythm and normal heart sounds.   Pulmonary/Chest: Effort normal and breath sounds normal.   Abdominal: Soft. Bowel sounds are normal. He exhibits no distension. Tenderness: Incisional.   The incision is clean.  The open area in the center was packed   Musculoskeletal: He exhibits no edema.   Neurological: He is alert and oriented to person, place, and time.   Skin: Skin is warm and dry. Capillary refill takes less than 2 seconds.   Psychiatric: He has a normal mood and affect. His behavior is normal. Judgment and thought content normal.   Nursing note and vitals reviewed.      Significant Labs:  CBC:   Recent Labs   Lab 01/23/19  0600   WBC 9.08   RBC 3.57*   HGB 9.4*   HCT 29.8*      MCV 84   MCH 26.3*   MCHC 31.5*     BMP:   Recent Labs   Lab 01/23/19  0600   GLU 70  70     137   K 4.1  4.1     107   CO2 20*  20*   BUN 21  21   CREATININE 1.9*  1.9*   CALCIUM 8.2*  8.2*       Significant Diagnostics:  None    Assessment/Plan:     * Ileostomy in place    Postop day 1 from ileostomy reversal.  Will start clear liquid     CKD (chronic  kidney disease) stage 3, GFR 30-59 ml/min    Nephrology consult monitor     Gastroesophageal reflux disease    PPI     Biventricular ICD (implantable cardioverter-defibrillator) in place    To      CAD, multiple vessel    Monitor     Hyperlipidemia associated with type 2 diabetes mellitus    Insulin sliding scale     Ischemic cardiomyopathy    Monitor     Hypertension associated with diabetes    Home medication         Kevin Lindsay MD  General Surgery  [facility] -

## 2019-01-23 NOTE — HPI
63 year old male with arthritis, CAD, CHF, CKD 3, DM2, HTN, hyperlipidemia, sleep apnea, pulmonary hypertension presents to [facility] for ileostomy reversal.     Nephrology was consulted to help with patient's renal care while he is admitted at [facility]. I  Saw and examined patient in his hospital room. Patient reports mild abdominal pain. No other issues at present.     Chart review revealed that patient is followed in [facility] by myself for CKD 3. Baseline creatinine is about 1.5 to 2.

## 2019-01-23 NOTE — HOSPITAL COURSE
Postop day 1.  Incisional pain. No nausea.  Marginal urine output.  Will start clear liquids  POD 2 :  Pain is improved, tolerated clear, voiding it toilet, loose stools  Postop day 3.  Tolerating a diet.  Pain controlled.  Some clear fluid from the old drain site.  The area was probed with a Q-tip.  No evidence of feculent drainage

## 2019-01-23 NOTE — SUBJECTIVE & OBJECTIVE
Past Medical History:   Diagnosis Date    Arthritis     CAD (coronary artery disease)     Cataract     CHF (congestive heart failure)     Dr Calvillo    Chronic combined systolic and diastolic congestive heart failure 3/24/2015    CKD (chronic kidney disease), stage III     Diabetes mellitus type II      AM    Diabetic retinopathy     anti Veg F injections for macular edema    Diverticulitis of colon     ED (erectile dysfunction)     Encounter for blood transfusion     Glaucoma     HTN (hypertension)     Hyperlipidemia     Ischemic cardiomyopathy     Obesity     JAHAIRA on CPAP     Pacemaker     Pulmonary HTN        Past Surgical History:   Procedure Laterality Date    CARDIAC CATHETERIZATION  2009    CHOLECYSTECTOMY      COLECTOMY-SIGMOID N/A 4/22/2016    [facility]    COLONOSCOPY N/A 10/11/2018    [facility] Quinn [facility] ENDO    COLOSTOMY N/A 4/22/2016    [facility]    CREATION, ILEOSTOMY N/A 10/30/2018    [facility]    EYE SURGERY      HEMICOLECTOMY, RIGHT Right 10/30/2018    [facility]    KNEE SURGERY      MOBILIZATION, SPLENIC FLEXURE N/A 10/30/2018    [facility]    REVISION OR CLOSURE, COLOSTOMY N/A 10/30/2018    [facility] OR    SIGMOIDOSCOPY, FLEXIBLE N/A 1/18/2019    Performed by Quinn [facility] ENDO       Review of patient's allergies indicates:  No Known Allergies  Current Facility-Administered Medications   Medication Frequency    0.9%  NaCl infusion Continuous    allopurinol tablet 100 mg Daily    aspirin EC tablet 81 mg QAM    bimatoprost 0.01 % ophthalmic drops 1 drop QHS    bumetanide tablet 2 mg QAM    chlorhexidine 0.12 % solution 10 mL BID    dextrose 50% injection 12.5 g PRN    diphenhydrAMINE capsule 25 mg Q6H PRN    enoxaparin  injection 40 mg Daily    glucagon (human recombinant) injection 1 mg PRN    HYDROcodone-acetaminophen 5-325 mg per tablet 1 tablet Q6H PRN    HYDROmorphone injection 1 mg Q4H PRN    insulin aspart U-100 pen 1-10 Units Q6H PRN    lisinopril tablet 5 mg Daily    nozaseptin (NOZIN) nasal  BID    ondansetron disintegrating tablet 8 mg Q8H PRN    pantoprazole EC tablet 40 mg Daily    simvastatin tablet 10 mg QHS    sodium bicarbonate tablet 1,950 mg TID    sodium chloride 0.9% flush 3 mL PRN     Family History     Problem Relation (Age of Onset)    Cancer Mother    Heart disease Father    No Known Problems Sister, Brother, Maternal Aunt, Maternal Uncle, Paternal Aunt, Paternal Uncle, Maternal Grandmother, Maternal Grandfather, Paternal Grandmother, Paternal Grandfather    Stroke Father        Tobacco Use    Smoking status: Never Smoker    Smokeless tobacco: Never Used   Substance and Sexual Activity    Alcohol use: No     Alcohol/week: 0.0 oz     Frequency: Never    Drug use: No    Sexual activity: Not Currently     Review of Systems   Constitutional: Negative for fever.   HENT: Negative for congestion.    Eyes: Negative for visual disturbance.   Respiratory: Negative for cough, shortness of breath and wheezing.    Cardiovascular: Negative for chest pain and palpitations.   Gastrointestinal: Positive for abdominal pain. Negative for diarrhea, nausea and vomiting.   Genitourinary: Negative for difficulty urinating and dysuria.   Musculoskeletal: Negative for joint swelling.   Skin: Negative for rash.   Neurological: Negative for weakness and headaches.     Objective:     Vital Signs (Most Recent):  Temp: 99 °F (37.2 °C) (01/23/19 0732)  Pulse: 108 (01/23/19 0732)  Resp: 18 (01/23/19 0732)  BP: 107/66 (01/23/19 0732)  SpO2: (!) 94 % (01/23/19 0732)  O2 Device (Oxygen Therapy): room air (01/22/19 2003) Vital Signs (24h Range):  Temp:  [97.7 °F (36.5 °C)-99 °F (37.2 °C)] 99 °F (37.2 °C)  Pulse:   [] 108  Resp:  [12-20] 18  SpO2:  [94 %-100 %] 94 %  BP: ()/(56-74) 107/66     Weight: 78.7 kg (173 lb 8 oz) (01/22/19 0943)  Body mass index is 26.38 kg/m².  Body surface area is 1.94 meters squared.    I/O last 3 completed shifts:  In: 1660 [P.O.:360; I.V.:1300]  Out: -     Physical Exam   Constitutional: He is oriented to person, place, and time. He appears well-developed. He is cooperative.   HENT:   Head: Normocephalic.   Nose: No rhinorrhea.   Mouth/Throat: Mucous membranes are normal. No oropharyngeal exudate.   Eyes: Pupils are equal, round, and reactive to light.   Neck: No thyroid mass present.   Cardiovascular: Normal rate, regular rhythm, S1 normal, S2 normal and intact distal pulses.   Pulmonary/Chest: Effort normal. No respiratory distress. He has no wheezes.   Abdominal: Soft. Bowel sounds are normal. He exhibits no distension. There is tenderness. No hernia.   Lymphadenopathy:     He has no cervical adenopathy.   Neurological: He is alert and oriented to person, place, and time.   Skin: Skin is warm and dry.   Psychiatric: He has a normal mood and affect.       Significant Labs:  Lab Results   Component Value Date    CREATININE 1.9 (H) 01/23/2019    CREATININE 1.9 (H) 01/23/2019    BUN 21 01/23/2019    BUN 21 01/23/2019     01/23/2019     01/23/2019    K 4.1 01/23/2019    K 4.1 01/23/2019     01/23/2019     01/23/2019    CO2 20 (L) 01/23/2019    CO2 20 (L) 01/23/2019     Lab Results   Component Value Date    .0 (H) 08/10/2018    CALCIUM 8.2 (L) 01/23/2019    CALCIUM 8.2 (L) 01/23/2019    PHOS 3.7 01/23/2019       Lab Results   Component Value Date    ALBUMIN 2.5 (L) 01/23/2019     Lab Results   Component Value Date    WBC 9.08 01/23/2019    HGB 9.4 (L) 01/23/2019    HCT 29.8 (L) 01/23/2019    MCV 84 01/23/2019     01/23/2019     No results for input(s): MG in the last 168 hours.      Significant Imaging:

## 2019-01-23 NOTE — ASSESSMENT & PLAN NOTE
Patient needs additional views and provider has been trying to contact her but they have not been able to, they asked if we can note this in case we can get in touch with patient. To

## 2019-01-23 NOTE — ASSESSMENT & PLAN NOTE
Patient with history of CKD 3 and baseline creatinine of 1.5 to 2. Creatinine currently at baseline. Will monitor closely. Continue IV fluids and ACE-inhibitor.

## 2019-01-23 NOTE — NURSING
No changes since arrived. Pt resting with wife at bedside. 12 hr chart check complete. VSS with NADN. Will report off.

## 2019-01-23 NOTE — NURSING
PICC line flushes easily w/ no blood return. Pt unable to void following wayne removal. Sent secure message to Dr. Melton for cathflow and catheter orders.

## 2019-01-23 NOTE — PLAN OF CARE
Problem: Adult Inpatient Plan of Care  Goal: Plan of Care Review  Outcome: Ongoing (interventions implemented as appropriate)  Pt had no adverse events during shift. Pt free of falls. Call light in reach. Side rails x 2. Pt denies pain. Pt due to void-low bladder scan volume. Dressing maintained CDI. NSR 90s on tele monitor. IVF administered as ordered. VSS. Chart reviewed, will continue to monitor.

## 2019-01-23 NOTE — NURSING
Chart reviewed for diabetes  Patient has been seen by this nurse on previous admits  Glucose stable   No further action unless diabetes educational needs are identified

## 2019-01-23 NOTE — PLAN OF CARE
CM met with patient/wife at the bedside to assess for discharge needs.  Patient states he is here for a planned ileostomy reversal.  He is current with [facility].  Consult received for home health resumption at discharge.  Choice form completed and placed in patient blue folder.  Patient does not anticipate any additional discharge needs at this time.  CM provided information on advanced directives, information on pharmacy bedside delivery, and discharge planning begins on admission with contact information for any needs/questions.     D/C Plan:  [facility]    [id number] - [place], [place] - [address] AT AT [facility]  [address]  [place] [address]  Phone: [phone] Fax: [phone]    [facility] [address] [place] [address]  Phone: [phone] Fax: [phone]    Sandra Estevez MD  Payor: MEDICARE / Plan: MEDICARE PART A & B / Product Type: [facility] /       01/23/19 1029   Discharge Assessment   Assessment Type Discharge Planning Assessment   Confirmed/corrected address and phone number on facesheet? Yes   Assessment information obtained from? Patient;Caregiver;Medical Record   Expected Length of Stay (days) (3-4)   Communicated expected length of stay with patient/caregiver yes   Prior to hospitilization cognitive status: Alert/Oriented   Prior to hospitalization functional status: Independent;Assistive Equipment   Current cognitive status: Alert/Oriented   Current Functional Status: Independent;Assistive Equipment   Able to Return to Prior Arrangements yes   Is patient able to care for self after discharge? Yes   Who are your caregiver(s) and their phone number(s)? Jessica Choudhury, spouse [phone]   Patient's perception of discharge disposition home health   Readmission Within the Last 30 Days planned readmission   If yes, most recent facility name: [facility]   Patient currently being followed by  outpatient case management? No   Patient currently receives any other outside agency services? No   Equipment Currently Used at Home cane, straight;CPAP;shower chair;glucometer   Do you have any problems affording any of your prescribed medications? No   Is the patient taking medications as prescribed? yes   Does the patient have transportation home? Yes   Transportation Anticipated family or friend will provide   Dialysis Name and Scheduled days NA   Does the patient receive services at the [facility]? No   Discharge Plan A Home with family;Home Health   DME Needed Upon Discharge  none   Patient/Family in Agreement with Plan yes   Readmission Questionnaire   At the time of your discharge, did someone talk to you about what your health problems were? Yes   At the time of discharge, did someone talk to you about what to watch out for regarding worsening of your health problem? Yes   At the time of discharge, did someone talk to you about what to do if you experienced worsening of your health problem? Yes   At the time of discharge, did someone talk to you about which medication to take when you left the hospital and which ones to stop taking? Yes   At the time of discharge, did someone talk to you about when and where to follow up with a doctor after you left the hospital? Yes   What do you believe caused you to be sick enough to be re-admitted? Planned for Ileostomy reversal

## 2019-01-23 NOTE — CONSULTS
[facility]  Nephrology  Consult Note          Patient Name: Yan Choudhury  MRN: [id number]  Admission Date: 1/22/2019  Hospital Length of Stay: 1 days  Attending Provider: Kevin Lindsay MD   Primary Care Physician: Sandra Estevez MD  Principal Problem:Ileostomy in place    Consults  Subjective:     HPI: 63 year old male with arthritis, CAD, CHF, CKD 3, DM2, HTN, hyperlipidemia, sleep apnea, pulmonary hypertension [place] [facility] [place].     Nephrology was consulted to help with patient's renal care while he is admitted at [facility]. I  Saw and examined patient in his hospital room. Patient reports mild abdominal pain. No other issues at present.     Chart review revealed that patient is followed in [facility] by myself for CKD 3. Baseline creatinine is about 1.5 to 2.     Past Medical History:   Diagnosis Date    Arthritis     CAD (coronary artery disease)     Cataract     CHF (congestive heart failure)     Dr Calvillo    Chronic combined systolic and diastolic congestive heart failure 3/24/2015    CKD (chronic kidney disease), stage III     Diabetes mellitus type II      AM    Diabetic retinopathy     anti Veg F injections for macular edema    Diverticulitis of colon     ED (erectile dysfunction)     Encounter for blood transfusion     Glaucoma     HTN (hypertension)     Hyperlipidemia     Ischemic cardiomyopathy     Obesity     JAHAIRA on CPAP     Pacemaker     Pulmonary HTN        Past Surgical History:   Procedure Laterality Date    CARDIAC CATHETERIZATION  2009    CHOLECYSTECTOMY      COLECTOMY-SIGMOID N/A 4/22/2016    [facility]formed by [facility] at [facility] OR    COLONOSCOPY N/A 10/11/2018    Performed by Quinn Aragon MD at [facility] ENDO    COLOSTOMY N/A 4/22/2016    Performed by [facility] at [facility] OR    CREATION, ILEOSTOMY N/A 10/30/2018    Performed by eKvin Lindsay MD at [facility] OR    EYE SURGERY      HEMICOLECTOMY, RIGHT Right  10/30/2018    [facility] at [facility] OR    KNEE SURGERY      MOBILIZATION, SPLENIC FLEXURE N/A 10/30/2018    Performed by Kevin Lindsay MD at [facility] OR    REVISION OR CLOSURE, COLOSTOMY N/A 10/30/2018    Performed by Kevin Lindsay MD at [facility] OR    SIGMOIDOSCOPY, FLEXIBLE N/A 1/18/2019    Performed by Quinn Aragon MD at [facility] ENDO       Review of patient's allergies indicates:  No Known Allergies  Current Facility-Administered Medications   Medication Frequency    0.9%  NaCl infusion Continuous    allopurinol tablet 100 mg Daily    aspirin EC tablet 81 mg QAM    bimatoprost 0.01 % ophthalmic drops 1 drop QHS    bumetanide tablet 2 mg QAM    chlorhexidine 0.12 % solution 10 mL BID    dextrose 50% injection 12.5 g PRN    diphenhydrAMINE capsule 25 mg Q6H PRN    enoxaparin injection 40 mg Daily    glucagon (human recombinant) injection 1 mg PRN    HYDROcodone-acetaminophen 5-325 mg per tablet 1 tablet Q6H PRN    HYDROmorphone injection 1 mg Q4H PRN    insulin aspart U-100 pen 1-10 Units Q6H PRN    lisinopril tablet 5 mg Daily    nozaseptin (NOZIN) nasal  BID    ondansetron disintegrating tablet 8 mg Q8H PRN    pantoprazole EC tablet 40 mg Daily    simvastatin tablet 10 mg QHS    sodium bicarbonate tablet 1,950 mg TID    sodium chloride 0.9% flush 3 mL PRN     Family History     Problem Relation (Age of Onset)    Cancer Mother    Heart disease Father    No Known Problems Sister, Brother, Maternal Aunt, Maternal Uncle, Paternal Aunt, Paternal Uncle, Maternal Grandmother, Maternal Grandfather, Paternal Grandmother, Paternal Grandfather    Stroke Father        Tobacco Use    Smoking status: Never Smoker    Smokeless tobacco: Never Used   Substance and Sexual Activity    Alcohol use: No     Alcohol/week: 0.0 oz     Frequency: Never    Drug use: No    Sexual activity: Not Currently     Review of Systems   Constitutional: Negative for fever.   HENT: Negative  for congestion.    Eyes: Negative for visual disturbance.   Respiratory: Negative for cough, shortness of breath and wheezing.    Cardiovascular: Negative for chest pain and palpitations.   Gastrointestinal: Positive for abdominal pain. Negative for diarrhea, nausea and vomiting.   Genitourinary: Negative for difficulty urinating and dysuria.   Musculoskeletal: Negative for joint swelling.   Skin: Negative for rash.   Neurological: Negative for weakness and headaches.     Objective:     Vital Signs (Most Recent):  Temp: 99 °F (37.2 °C) (01/23/19 0732)  Pulse: 108 (01/23/19 0732)  Resp: 18 (01/23/19 0732)  BP: 107/66 (01/23/19 0732)  SpO2: (!) 94 % (01/23/19 0732)  O2 Device (Oxygen Therapy): room air (01/22/19 2003) Vital Signs (24h Range):  Temp:  [97.7 °F (36.5 °C)-99 °F (37.2 °C)] 99 °F (37.2 °C)  Pulse:  [] 108  Resp:  [12-20] 18  SpO2:  [94 %-100 %] 94 %  BP: ()/(56-74) 107/66     Weight: 78.7 kg (173 lb 8 oz) (01/22/19 0943)  Body mass index is 26.38 kg/m².  Body surface area is 1.94 meters squared.    I/O last 3 completed shifts:  In: 1660 [P.O.:360; I.V.:1300]  Out: -     Physical Exam   Constitutional: He is oriented to person, place, and time. He appears well-developed. He is cooperative.   HENT:   Head: Normocephalic.   Nose: No rhinorrhea.   Mouth/Throat: Mucous membranes are normal. No oropharyngeal exudate.   Eyes: Pupils are equal, round, and reactive to light.   Neck: No thyroid mass present.   Cardiovascular: Normal rate, regular rhythm, S1 normal, S2 normal and intact distal pulses.   Pulmonary/Chest: Effort normal. No respiratory distress. He has no wheezes.   Abdominal: Soft. Bowel sounds are normal. He exhibits no distension. There is tenderness. No hernia.   Lymphadenopathy:     He has no cervical adenopathy.   Neurological: He is alert and oriented to person, place, and time.   Skin: Skin is warm and dry.   Psychiatric: He has a normal mood and affect.       Significant Labs:  Lab  Results   Component Value Date    CREATININE 1.9 (H) 01/23/2019    CREATININE 1.9 (H) 01/23/2019    BUN 21 01/23/2019    BUN 21 01/23/2019     01/23/2019     01/23/2019    K 4.1 01/23/2019    K 4.1 01/23/2019     01/23/2019     01/23/2019    CO2 20 (L) 01/23/2019    CO2 20 (L) 01/23/2019     Lab Results   Component Value Date    .0 (H) 08/10/2018    CALCIUM 8.2 (L) 01/23/2019    CALCIUM 8.2 (L) 01/23/2019    PHOS 3.7 01/23/2019       Lab Results   Component Value Date    ALBUMIN 2.5 (L) 01/23/2019     Lab Results   Component Value Date    WBC 9.08 01/23/2019    HGB 9.4 (L) 01/23/2019    HCT 29.8 (L) 01/23/2019    MCV 84 01/23/2019     01/23/2019     No results for input(s): MG in the last 168 hours.      Significant Imaging:          Assessment/Plan:     CKD (chronic kidney disease) stage 3, GFR 30-59 ml/min    Patient with history of CKD 3 and baseline creatinine of 1.5 to 2. Creatinine currently at baseline. Will monitor closely. Continue IV fluids and ACE-inhibitor.          Thank you for your consult. I will follow-up with patient. Please contact us if you have any additional questions.    Aubrey Seth MD   Nephrology  [facility]

## 2019-01-23 NOTE — HPI
Patient is status post colostomy reversal.  He had a protective ileostomy done due to a leak. A Gastrografin enema showed no evidence of a leak. The drain fell out and was left out.     The patient has had issues with ileostomy output.  The ileostomy output ranges from 500-700 and occasionally 1400 mL a day.  This has created issues with renal failure and hyponatremia.  The patient is admitted now for correction of his hyponatremia and renal failure.  We will check his nutritional status.  Consideration will be given to ileostomy reversal during this admission.  Will ask Cardiology to see him to re-evaluate his cardiac risk says he has been at an increased risk for surgical procedures in the past    He presents now for ileostomy reversal

## 2019-01-24 LAB
ANION GAP SERPL CALC-SCNC: 7 MMOL/L
BUN SERPL-MCNC: 19 MG/DL
CALCIUM SERPL-MCNC: 7.9 MG/DL
CHLORIDE SERPL-SCNC: 109 MMOL/L
CO2 SERPL-SCNC: 23 MMOL/L
CREAT SERPL-MCNC: 1.9 MG/DL
EST. GFR  (AFRICAN AMERICAN): 42 ML/MIN/1.73 M^2
EST. GFR  (NON AFRICAN AMERICAN): 36 ML/MIN/1.73 M^2
GLUCOSE SERPL-MCNC: 93 MG/DL
POCT GLUCOSE: 111 MG/DL (ref 70–110)
POCT GLUCOSE: 136 MG/DL (ref 70–110)
POCT GLUCOSE: 138 MG/DL (ref 70–110)
POCT GLUCOSE: 147 MG/DL (ref 70–110)
POTASSIUM SERPL-SCNC: 3.6 MMOL/L
SODIUM SERPL-SCNC: 139 MMOL/L

## 2019-01-24 PROCEDURE — 82962 GLUCOSE BLOOD TEST: CPT

## 2019-01-24 PROCEDURE — 21400001 HC TELEMETRY ROOM

## 2019-01-24 PROCEDURE — 80048 BASIC METABOLIC PNL TOTAL CA: CPT

## 2019-01-24 PROCEDURE — 99232 PR SUBSEQUENT HOSPITAL CARE,LEVL II: ICD-10-PCS | Mod: ,,, | Performed by: INTERNAL MEDICINE

## 2019-01-24 PROCEDURE — 96372 THER/PROPH/DIAG INJ SC/IM: CPT

## 2019-01-24 PROCEDURE — 94799 UNLISTED PULMONARY SVC/PX: CPT

## 2019-01-24 PROCEDURE — 25000003 PHARM REV CODE 250: Performed by: SURGERY

## 2019-01-24 PROCEDURE — 63600175 PHARM REV CODE 636 W HCPCS: Performed by: SURGERY

## 2019-01-24 PROCEDURE — 99232 SBSQ HOSP IP/OBS MODERATE 35: CPT | Mod: ,,, | Performed by: INTERNAL MEDICINE

## 2019-01-24 RX ADMIN — CHLORHEXIDINE GLUCONATE 10 ML: 1.2 RINSE ORAL at 09:01

## 2019-01-24 RX ADMIN — ASPIRIN 81 MG: 81 TABLET, COATED ORAL at 06:01

## 2019-01-24 RX ADMIN — LISINOPRIL 5 MG: 5 TABLET ORAL at 09:01

## 2019-01-24 RX ADMIN — BIMATOPROST 1 DROP: 0.1 SOLUTION/ DROPS OPHTHALMIC at 09:01

## 2019-01-24 RX ADMIN — ENOXAPARIN SODIUM 40 MG: 100 INJECTION SUBCUTANEOUS at 04:01

## 2019-01-24 RX ADMIN — ALLOPURINOL 100 MG: 100 TABLET ORAL at 09:01

## 2019-01-24 RX ADMIN — PANTOPRAZOLE SODIUM 40 MG: 40 TABLET, DELAYED RELEASE ORAL at 09:01

## 2019-01-24 RX ADMIN — HYDROCODONE BITARTRATE AND ACETAMINOPHEN 1 TABLET: 5; 325 TABLET ORAL at 11:01

## 2019-01-24 RX ADMIN — BUMETANIDE 2 MG: 1 TABLET ORAL at 06:01

## 2019-01-24 RX ADMIN — SODIUM BICARBONATE 650 MG TABLET 1950 MG: at 09:01

## 2019-01-24 RX ADMIN — SODIUM BICARBONATE 650 MG TABLET 1950 MG: at 03:01

## 2019-01-24 RX ADMIN — SIMVASTATIN 10 MG: 5 TABLET, FILM COATED ORAL at 09:01

## 2019-01-24 NOTE — ASSESSMENT & PLAN NOTE
Patient with history of CKD 3 and baseline creatinine of 1.5 to 2. Creatinine currently at 1.9 (unchanged from yesterday). Will monitor closely. Continue IV fluids and ACE-inhibitor.

## 2019-01-24 NOTE — PLAN OF CARE
Problem: Adult Inpatient Plan of Care  Goal: Plan of Care Review  Outcome: Ongoing (interventions implemented as appropriate)  Pt. Ambulated in room. Tolerated full liquid diet and advanced to DM diet. Pain controlled. Free of injury. Chart check completed.

## 2019-01-24 NOTE — ASSESSMENT & PLAN NOTE
Postop day 2 from ileostomy reversal.   Full liquid to regular diet (diabetic)    Home tomorrow if regular diet is tolerated and renal function at base line

## 2019-01-24 NOTE — ASSESSMENT & PLAN NOTE
Nephrology consult monitor    discontinue ivf after current liter of saline is infused  check renal panel in the morning

## 2019-01-24 NOTE — PROGRESS NOTES
[facility] -   General Surgery  Progress Note    Subjective:     History of Present Illness:  Patient is status post colostomy reversal.  He had a protective ileostomy done due to a leak. A Gastrografin enema showed no evidence of a leak. The drain fell out and was left out.     The patient has had issues with ileostomy output.  The ileostomy output ranges from 500-700 and occasionally 1400 mL a day.  This has created issues with renal failure and hyponatremia.  The patient is admitted now for correction of his hyponatremia and renal failure.  We will check his nutritional status.  Consideration will be given to ileostomy reversal during this admission.  Will ask Cardiology to see him to re-evaluate his cardiac risk says he has been at an increased risk for surgical procedures in the past    He presents now for ileostomy reversal        Post-Op Info:  Procedure(s) (LRB):  CLOSURE, ILEOSTOMY (N/A)   2 Days Post-Op     Interval History: tolerating clear, voiding into toilet, loose stool    Medications:  Continuous Infusions:   sodium chloride 0.9% 100 mL/hr at 01/23/19 1224     Scheduled Meds:   allopurinol  100 mg Oral Daily    aspirin  81 mg Oral QAM    bimatoprost  1 drop Left Eye QHS    bumetanide  2 mg Oral QAM    chlorhexidine  10 mL Mouth/Throat BID    enoxaparin  40 mg Subcutaneous Daily    lisinopril  5 mg Oral Daily    nozaseptin   Each Nare BID    pantoprazole  40 mg Oral Daily    simvastatin  10 mg Oral QHS    sodium bicarbonate  1,950 mg Oral TID     PRN Meds:dextrose 50%, diphenhydrAMINE, glucagon (human recombinant), HYDROcodone-acetaminophen, HYDROmorphone, insulin aspart U-100, ondansetron, sodium chloride 0.9%     Review of patient's allergies indicates:  No Known Allergies  Objective:     Vital Signs (Most Recent):  Temp: 99.6 °F (37.6 °C) (01/24/19 0722)  Pulse: 95 (01/24/19 0722)  Resp: 16 (01/24/19 0722)  BP: (!) 109/59 (01/24/19 0722)  SpO2: 96 % (01/24/19 0722) Vital  Signs (24h Range):  Temp:  [98.7 °F (37.1 °C)-99.6 °F (37.6 °C)] 99.6 °F (37.6 °C)  Pulse:  [] 95  Resp:  [16-18] 16  SpO2:  [93 %-99 %] 96 %  BP: ()/(53-64) 109/59     Weight: 78.7 kg (173 lb 8 oz)  Body mass index is 26.38 kg/m².    Intake/Output - Last 3 Shifts       01/22 0700 - 01/23 0659 01/23 0700 - 01/24 0659 01/24 0700 - 01/25 0659    P.O. 360 920     I.V. (mL/kg) 1300 (16.5) 2400 (30.5)     Total Intake(mL/kg) 1660 (21.1) 3320 (42.2)     Urine (mL/kg/hr)  250 (0.1)     Stool  0     Total Output  250     Net +1660 +3070            Urine Occurrence  5 x     Stool Occurrence  1 x           Physical Exam   Constitutional: He is oriented to person, place, and time.   Chronically ill   HENT:   Head: Normocephalic and atraumatic.   Neck: Normal range of motion. Neck supple.   Cardiovascular: Normal rate, regular rhythm and normal heart sounds.   Pulmonary/Chest: Effort normal and breath sounds normal.   Abdominal: Soft. Bowel sounds are normal. He exhibits no distension. Tenderness: mild incisional.   Ileostomy take down site is clean   Neurological: He is alert and oriented to person, place, and time.   Skin: Skin is warm. Capillary refill takes less than 2 seconds.   Psychiatric: He has a normal mood and affect. His behavior is normal. Judgment and thought content normal.   Vitals reviewed.      Significant Labs:  CBC:   Recent Labs   Lab 01/23/19  0600   WBC 9.08   RBC 3.57*   HGB 9.4*   HCT 29.8*      MCV 84   MCH 26.3*   MCHC 31.5*     BMP:   Recent Labs   Lab 01/24/19  0400   GLU 93      K 3.6      CO2 23   BUN 19   CREATININE 1.9*   CALCIUM 7.9*       Significant Diagnostics:  none    Assessment/Plan:     * Ileostomy in place    Postop day 2 from ileostomy reversal.   Full liquid to regular diet (diabetic)    Home tomorrow if regular diet is tolerated and renal function at base line     CKD (chronic kidney disease) stage 3, GFR 30-59 ml/min    Nephrology consult  monitor    discontinue ivf after current liter of saline is infused  check renal panel in the morning     Gastroesophageal reflux disease    PPI     Biventricular ICD (implantable cardioverter-defibrillator) in place    To      CAD, multiple vessel    Monitor     Hyperlipidemia associated with type 2 diabetes mellitus    Insulin sliding scale     Ischemic cardiomyopathy    Monitor     Hypertension associated with diabetes    Home medication         Kevin Lindsay MD  General Surgery  [facility]

## 2019-01-24 NOTE — PLAN OF CARE
Problem: Adult Inpatient Plan of Care  Goal: Plan of Care Review  Outcome: Ongoing (interventions implemented as appropriate)  Patient remained injury free and pain was managed with pain medication. Patient had in and out cath this AM and has voided without complication post in and out cath.  Tolerated up in chair.

## 2019-01-24 NOTE — SUBJECTIVE & OBJECTIVE
Interval History:     1/24/19: patient denies any complaints today. Renal function has remained stable with creatinine at 1.9.         Review of patient's allergies indicates:  No Known Allergies  Current Facility-Administered Medications   Medication Frequency    0.9%  NaCl infusion Continuous    allopurinol tablet 100 mg Daily    aspirin EC tablet 81 mg QAM    bimatoprost 0.01 % ophthalmic drops 1 drop QHS    bumetanide tablet 2 mg QAM    chlorhexidine 0.12 % solution 10 mL BID    dextrose 50% injection 12.5 g PRN    diphenhydrAMINE capsule 25 mg Q6H PRN    enoxaparin injection 40 mg Daily    glucagon (human recombinant) injection 1 mg PRN    HYDROcodone-acetaminophen 5-325 mg per tablet 1 tablet Q6H PRN    HYDROmorphone injection 1 mg Q4H PRN    insulin aspart U-100 pen 1-10 Units Q6H PRN    lisinopril tablet 5 mg Daily    nozaseptin (NOZIN) nasal  BID    ondansetron disintegrating tablet 8 mg Q8H PRN    pantoprazole EC tablet 40 mg Daily    simvastatin tablet 10 mg QHS    sodium bicarbonate tablet 1,950 mg TID    sodium chloride 0.9% flush 3 mL PRN       Objective:     Vital Signs (Most Recent):  Temp: 99.6 °F (37.6 °C) (01/24/19 0722)  Pulse: 95 (01/24/19 0722)  Resp: 16 (01/24/19 0722)  BP: (!) 109/59 (01/24/19 0722)  SpO2: 96 % (01/24/19 0722)  O2 Device (Oxygen Therapy): room air (01/24/19 0722) Vital Signs (24h Range):  Temp:  [98.7 °F (37.1 °C)-99.6 °F (37.6 °C)] 99.6 °F (37.6 °C)  Pulse:  [] 95  Resp:  [16-18] 16  SpO2:  [93 %-99 %] 96 %  BP: ()/(53-64) 109/59     Weight: 78.7 kg (173 lb 8 oz) (01/22/19 0943)  Body mass index is 26.38 kg/m².  Body surface area is 1.94 meters squared.    I/O last 3 completed shifts:  In: 4740 [P.O.:1040; I.V.:3700]  Out: 250 [Urine:250]    Physical Exam   Constitutional: He is oriented to person, place, and time. He appears well-developed. He is cooperative.   HENT:   Head: Normocephalic.   Nose: No rhinorrhea.   Mouth/Throat:  Mucous membranes are normal. No oropharyngeal exudate.   Eyes: Pupils are equal, round, and reactive to light.   Neck: No thyroid mass present.   Cardiovascular: Normal rate, regular rhythm, S1 normal, S2 normal and intact distal pulses.   Pulmonary/Chest: Effort normal. No respiratory distress. He has no wheezes.   Abdominal: Soft. Bowel sounds are normal. He exhibits no distension. There is tenderness. No hernia.   Lymphadenopathy:     He has no cervical adenopathy.   Neurological: He is alert and oriented to person, place, and time.   Skin: Skin is warm and dry.   Psychiatric: He has a normal mood and affect.       Significant Labs:  Lab Results   Component Value Date    CREATININE 1.9 (H) 01/24/2019    BUN 19 01/24/2019     01/24/2019    K 3.6 01/24/2019     01/24/2019    CO2 23 01/24/2019     Lab Results   Component Value Date    .0 (H) 08/10/2018    CALCIUM 7.9 (L) 01/24/2019    PHOS 3.7 01/23/2019     Lab Results   Component Value Date    ALBUMIN 2.5 (L) 01/23/2019     Lab Results   Component Value Date    WBC 9.08 01/23/2019    HGB 9.4 (L) 01/23/2019    HCT 29.8 (L) 01/23/2019    MCV 84 01/23/2019     01/23/2019     No results for input(s): MG in the last 168 hours.      Significant Imaging:

## 2019-01-24 NOTE — PLAN OF CARE
Problem: Adult Inpatient Plan of Care  Goal: Plan of Care Review  Outcome: Ongoing (interventions implemented as appropriate)  Remains free form harm, no c/o pain, positions self, no acute distress noted. 24 hour chart check complete.

## 2019-01-24 NOTE — PROGRESS NOTES
[facility] -   Nephrology  Progress Note    Patient Name: Yan Choudhury  MRN: [id number]  Admission Date: 1/22/2019  Hospital Length of Stay: 2 days  Attending Provider: Kevin Lindsay MD   Primary Care Physician: Sandra Estevez MD  Principal Problem:Ileostomy in place    Subjective:     HPI: 63 year old male with arthritis, CAD, CHF, CKD 3, DM2, HTN, hyperlipidemia, sleep apnea, pulmonary hypertension presents to [facility] for ileostomy reversal.     Nephrology was consulted to help with patient's renal care while he is admitted at [facility]. I  Saw and examined patient in his hospital room. Patient reports mild abdominal pain. No other issues at present.     Chart review revealed that patient is followed in [facility] by myself for CKD 3. Baseline creatinine is about 1.5 to 2.     Interval History:     1/24/19: patient denies any complaints today. Renal function has remained stable with creatinine at 1.9.         Review of patient's allergies indicates:  No Known Allergies  Current Facility-Administered Medications   Medication Frequency    0.9%  NaCl infusion Continuous    allopurinol tablet 100 mg Daily    aspirin EC tablet 81 mg QAM    bimatoprost 0.01 % ophthalmic drops 1 drop QHS    bumetanide tablet 2 mg QAM    chlorhexidine 0.12 % solution 10 mL BID    dextrose 50% injection 12.5 g PRN    diphenhydrAMINE capsule 25 mg Q6H PRN    enoxaparin injection 40 mg Daily    glucagon (human recombinant) injection 1 mg PRN    HYDROcodone-acetaminophen 5-325 mg per tablet 1 tablet Q6H PRN    HYDROmorphone injection 1 mg Q4H PRN    insulin aspart U-100 pen 1-10 Units Q6H PRN    lisinopril tablet 5 mg Daily    nozaseptin (NOZIN) nasal  BID    ondansetron disintegrating tablet 8 mg Q8H PRN    pantoprazole EC tablet 40 mg Daily    simvastatin tablet 10 mg QHS    sodium bicarbonate tablet 1,950 mg TID    sodium chloride 0.9% flush 3 mL PRN       Objective:     Vital Signs (Most  Recent):  Temp: 99.6 °F (37.6 °C) (01/24/19 0722)  Pulse: 95 (01/24/19 0722)  Resp: 16 (01/24/19 0722)  BP: (!) 109/59 (01/24/19 0722)  SpO2: 96 % (01/24/19 0722)  O2 Device (Oxygen Therapy): room air (01/24/19 0722) Vital Signs (24h Range):  Temp:  [98.7 °F (37.1 °C)-99.6 °F (37.6 °C)] 99.6 °F (37.6 °C)  Pulse:  [] 95  Resp:  [16-18] 16  SpO2:  [93 %-99 %] 96 %  BP: ()/(53-64) 109/59     Weight: 78.7 kg (173 lb 8 oz) (01/22/19 0943)  Body mass index is 26.38 kg/m².  Body surface area is 1.94 meters squared.    I/O last 3 completed shifts:  In: 4740 [P.O.:1040; I.V.:3700]  Out: 250 [Urine:250]    Physical Exam   Constitutional: He is oriented to person, place, and time. He appears well-developed. He is cooperative.   HENT:   Head: Normocephalic.   Nose: No rhinorrhea.   Mouth/Throat: Mucous membranes are normal. No oropharyngeal exudate.   Eyes: Pupils are equal, round, and reactive to light.   Neck: No thyroid mass present.   Cardiovascular: Normal rate, regular rhythm, S1 normal, S2 normal and intact distal pulses.   Pulmonary/Chest: Effort normal. No respiratory distress. He has no wheezes.   Abdominal: Soft. Bowel sounds are normal. He exhibits no distension. There is tenderness. No hernia.   Lymphadenopathy:     He has no cervical adenopathy.   Neurological: He is alert and oriented to person, place, and time.   Skin: Skin is warm and dry.   Psychiatric: He has a normal mood and affect.       Significant Labs:  Lab Results   Component Value Date    CREATININE 1.9 (H) 01/24/2019    BUN 19 01/24/2019     01/24/2019    K 3.6 01/24/2019     01/24/2019    CO2 23 01/24/2019     Lab Results   Component Value Date    .0 (H) 08/10/2018    CALCIUM 7.9 (L) 01/24/2019    PHOS 3.7 01/23/2019     Lab Results   Component Value Date    ALBUMIN 2.5 (L) 01/23/2019     Lab Results   Component Value Date    WBC 9.08 01/23/2019    HGB 9.4 (L) 01/23/2019    HCT 29.8 (L) 01/23/2019    MCV 84 01/23/2019      01/23/2019     No results for input(s): MG in the last 168 hours.      Significant Imaging:          Assessment/Plan:     CKD (chronic kidney disease) stage 3, GFR 30-59 ml/min    Patient with history of CKD 3 and baseline creatinine of 1.5 to 2. Creatinine currently at 1.9 (unchanged from yesterday). Will monitor closely. Continue IV fluids and ACE-inhibitor.          Thank you for your consult. I will follow-up with patient. Please contact us if you have any additional questions.    Aubrey Seth MD  Nephrology  [facility]

## 2019-01-24 NOTE — SUBJECTIVE & OBJECTIVE
Interval History: tolerating clear, voiding into toilet, loose stool    Medications:  Continuous Infusions:   sodium chloride 0.9% 100 mL/hr at 01/23/19 1224     Scheduled Meds:   allopurinol  100 mg Oral Daily    aspirin  81 mg Oral QAM    bimatoprost  1 drop Left Eye QHS    bumetanide  2 mg Oral QAM    chlorhexidine  10 mL Mouth/Throat BID    enoxaparin  40 mg Subcutaneous Daily    lisinopril  5 mg Oral Daily    nozaseptin   Each Nare BID    pantoprazole  40 mg Oral Daily    simvastatin  10 mg Oral QHS    sodium bicarbonate  1,950 mg Oral TID     PRN Meds:dextrose 50%, diphenhydrAMINE, glucagon (human recombinant), HYDROcodone-acetaminophen, HYDROmorphone, insulin aspart U-100, ondansetron, sodium chloride 0.9%     Review of patient's allergies indicates:  No Known Allergies  Objective:     Vital Signs (Most Recent):  Temp: 99.6 °F (37.6 °C) (01/24/19 0722)  Pulse: 95 (01/24/19 0722)  Resp: 16 (01/24/19 0722)  BP: (!) 109/59 (01/24/19 0722)  SpO2: 96 % (01/24/19 0722) Vital Signs (24h Range):  Temp:  [98.7 °F (37.1 °C)-99.6 °F (37.6 °C)] 99.6 °F (37.6 °C)  Pulse:  [] 95  Resp:  [16-18] 16  SpO2:  [93 %-99 %] 96 %  BP: ()/(53-64) 109/59     Weight: 78.7 kg (173 lb 8 oz)  Body mass index is 26.38 kg/m².    Intake/Output - Last 3 Shifts       01/22 0700 - 01/23 0659 01/23 0700 - 01/24 0659 01/24 0700 - 01/25 0659    P.O. 360 920     I.V. (mL/kg) 1300 (16.5) 2400 (30.5)     Total Intake(mL/kg) 1660 (21.1) 3320 (42.2)     Urine (mL/kg/hr)  250 (0.1)     Stool  0     Total Output  250     Net +1660 +3070            Urine Occurrence  5 x     Stool Occurrence  1 x           Physical Exam   Constitutional: He is oriented to person, place, and time.   Chronically ill   HENT:   Head: Normocephalic and atraumatic.   Neck: Normal range of motion. Neck supple.   Cardiovascular: Normal rate, regular rhythm and normal heart sounds.   Pulmonary/Chest: Effort normal and breath sounds normal.   Abdominal:  Soft. Bowel sounds are normal. He exhibits no distension. Tenderness: mild incisional.   Ileostomy take down site is clean   Neurological: He is alert and oriented to person, place, and time.   Skin: Skin is warm. Capillary refill takes less than 2 seconds.   Psychiatric: He has a normal mood and affect. His behavior is normal. Judgment and thought content normal.   Vitals reviewed.      Significant Labs:  CBC:   Recent Labs   Lab 01/23/19  0600   WBC 9.08   RBC 3.57*   HGB 9.4*   HCT 29.8*      MCV 84   MCH 26.3*   MCHC 31.5*     BMP:   Recent Labs   Lab 01/24/19  0400   GLU 93      K 3.6      CO2 23   BUN 19   CREATININE 1.9*   CALCIUM 7.9*       Significant Diagnostics:  none

## 2019-01-25 VITALS
OXYGEN SATURATION: 97 % | TEMPERATURE: 98 F | HEIGHT: 68 IN | HEART RATE: 102 BPM | RESPIRATION RATE: 20 BRPM | SYSTOLIC BLOOD PRESSURE: 98 MMHG | DIASTOLIC BLOOD PRESSURE: 59 MMHG | BODY MASS INDEX: 26.3 KG/M2 | WEIGHT: 173.5 LBS

## 2019-01-25 LAB
ALBUMIN SERPL BCP-MCNC: 2.4 G/DL
ANION GAP SERPL CALC-SCNC: 9 MMOL/L
BUN SERPL-MCNC: 15 MG/DL
CALCIUM SERPL-MCNC: 8.1 MG/DL
CHLORIDE SERPL-SCNC: 107 MMOL/L
CO2 SERPL-SCNC: 23 MMOL/L
CREAT SERPL-MCNC: 1.6 MG/DL
EST. GFR  (AFRICAN AMERICAN): 51 ML/MIN/1.73 M^2
EST. GFR  (NON AFRICAN AMERICAN): 45 ML/MIN/1.73 M^2
GLUCOSE SERPL-MCNC: 95 MG/DL
PHOSPHATE SERPL-MCNC: 2.4 MG/DL
POCT GLUCOSE: 107 MG/DL (ref 70–110)
POCT GLUCOSE: 114 MG/DL (ref 70–110)
POTASSIUM SERPL-SCNC: 3.6 MMOL/L
SODIUM SERPL-SCNC: 139 MMOL/L

## 2019-01-25 PROCEDURE — 99233 PR SUBSEQUENT HOSPITAL CARE,LEVL III: ICD-10-PCS | Mod: ,,, | Performed by: INTERNAL MEDICINE

## 2019-01-25 PROCEDURE — 80069 RENAL FUNCTION PANEL: CPT

## 2019-01-25 PROCEDURE — 99233 SBSQ HOSP IP/OBS HIGH 50: CPT | Mod: ,,, | Performed by: INTERNAL MEDICINE

## 2019-01-25 PROCEDURE — 94799 UNLISTED PULMONARY SVC/PX: CPT

## 2019-01-25 PROCEDURE — 25000003 PHARM REV CODE 250: Performed by: SURGERY

## 2019-01-25 RX ADMIN — LISINOPRIL 5 MG: 5 TABLET ORAL at 08:01

## 2019-01-25 RX ADMIN — BUMETANIDE 2 MG: 1 TABLET ORAL at 06:01

## 2019-01-25 RX ADMIN — ALLOPURINOL 100 MG: 100 TABLET ORAL at 08:01

## 2019-01-25 RX ADMIN — CHLORHEXIDINE GLUCONATE 10 ML: 1.2 RINSE ORAL at 08:01

## 2019-01-25 RX ADMIN — SODIUM BICARBONATE 650 MG TABLET 1950 MG: at 08:01

## 2019-01-25 RX ADMIN — PANTOPRAZOLE SODIUM 40 MG: 40 TABLET, DELAYED RELEASE ORAL at 08:01

## 2019-01-25 RX ADMIN — ASPIRIN 81 MG: 81 TABLET, COATED ORAL at 06:01

## 2019-01-25 NOTE — PLAN OF CARE
Jan31 Established Patient Visit with MANPREET Vivas   Thursday Jan 31, 2019 10:30 AM  [facility] - Cardiology   [address]  [place] [address]   [phone]    Feb11 Post OP with Kevin Lindsay MD   Monday Feb 11, 2019 11:00 AM  [facility] - General Surgery   [address] [facility] [address]   [phone]         01/25/19 1049   Final Note   Assessment Type Final Discharge Note   Anticipated Discharge Disposition Home-Health   Hospital Follow Up  Appt(s) scheduled? Yes   Right Care Referral Info   Post Acute Recommendation Home-care   Facility Name [facility], State [place], [place]

## 2019-01-25 NOTE — PROGRESS NOTES
[facility] -   Nephrology  Progress Note    Patient Name: Yan Choudhury  MRN: [id number]  Admission Date: 1/22/2019  Hospital Length of Stay: 3 days  Attending Provider: Kevin Lindsay MD   Primary Care Physician: Sandra Estevez MD  Principal Problem:Ileostomy in place, CKD, electrolyte disorder    Subjective:     HPI: 63 year old male with arthritis, CAD, CHF, CKD 3, DM2, HTN, hyperlipidemia, sleep apnea, pulmonary hypertension presents to [facility] for ileostomy reversal.     Nephrology was consulted to help with patient's renal care while he is admitted at [facility]. I  Saw and examined patient in his hospital room. Patient reports mild abdominal pain. No other issues at present.     Chart review revealed that patient is followed in [facility] by myself for CKD 3. Baseline creatinine is about 1.5 to 2.     Interval History: Pt was seen and examined. H/o reviewed. Pt is a 62 y/o male with CKD-3, and electrolyte disorders (hyponatremia), secondary to CHF and high ileostomy output presented electively for ileostomy reversal. Pt is s/p the procedure, doing well, no new issues or events overnight, no c/o's, no SOB.    Review of patient's allergies indicates:  No Known Allergies  Current Facility-Administered Medications   Medication Frequency    allopurinol tablet 100 mg Daily    aspirin EC tablet 81 mg QAM    bimatoprost 0.01 % ophthalmic drops 1 drop QHS    bumetanide tablet 2 mg QAM    chlorhexidine 0.12 % solution 10 mL BID    dextrose 50% injection 12.5 g PRN    diphenhydrAMINE capsule 25 mg Q6H PRN    enoxaparin injection 40 mg Daily    glucagon (human recombinant) injection 1 mg PRN    HYDROcodone-acetaminophen 5-325 mg per tablet 1 tablet Q6H PRN    HYDROmorphone injection 1 mg Q4H PRN    insulin aspart U-100 pen 1-10 Units Q6H PRN    lisinopril tablet 5 mg Daily    nozaseptin (NOZIN) nasal  BID    ondansetron disintegrating tablet 8 mg Q8H PRN    pantoprazole EC tablet 40 mg  Daily    simvastatin tablet 10 mg QHS    sodium bicarbonate tablet 1,950 mg TID    sodium chloride 0.9% flush 3 mL PRN       Objective:     Vital Signs (Most Recent):  Temp: 99.3 °F (37.4 °C) (01/25/19 0728)  Pulse: 97 (01/25/19 0728)  Resp: 16 (01/25/19 0728)  BP: 107/65 (01/25/19 0728)  SpO2: 97 % (01/25/19 0728)  O2 Device (Oxygen Therapy): room air (01/24/19 1959) Vital Signs (24h Range):  Temp:  [98 °F (36.7 °C)-99.6 °F (37.6 °C)] 99.3 °F (37.4 °C)  Pulse:  [] 97  Resp:  [16-20] 16  SpO2:  [95 %-98 %] 97 %  BP: ()/(60-75) 107/65     Weight: 78.7 kg (173 lb 8 oz) (01/22/19 0943)  Body mass index is 26.38 kg/m².  Body surface area is 1.94 meters squared.    I/O last 3 completed shifts:  In: 3660 [P.O.:1560; I.V.:2100]  Out: -     Physical Exam   Constitutional: He is oriented to person, place, and time. He appears well-developed and well-nourished. No distress.   Neck: No JVD present.   Cardiovascular: Normal rate, regular rhythm and normal heart sounds. Exam reveals no friction rub.   Pulmonary/Chest: Effort normal and breath sounds normal. He has no rales.   Abdominal: Soft. He exhibits no distension. There is no tenderness.   Musculoskeletal: He exhibits no edema.   Neurological: He is alert and oriented to person, place, and time.   Skin: Skin is warm and dry.   Psychiatric: He has a normal mood and affect. His behavior is normal.       Significant Labs: reviewed  BMP  Lab Results   Component Value Date     01/25/2019    K 3.6 01/25/2019     01/25/2019    CO2 23 01/25/2019    BUN 15 01/25/2019    CREATININE 1.6 (H) 01/25/2019    CALCIUM 8.1 (L) 01/25/2019    ANIONGAP 9 01/25/2019    ESTGFRAFRICA 51 (A) 01/25/2019    EGFRNONAA 45 (A) 01/25/2019         Significant Imaging:  reviewed    Assessment/Plan:         64 y/o male with past h/o ofhyponatremia, high ileostomy out-put and past h/o of CHF presented for colostomy reversal.           * Hyponatremia     Hyponatremia: resolved, s Na  normal  Was secondary to CHF and high ileostomy output.  K normal  Normal acid base  In the past had chronic hyponatremia (about 130)  This was due to severe heart failure (LV dysfunction with EF 30% as well as RV dysfunction and diastolic CHF)  Hypovolemia was corrected. Pt is now euvolemic.     Metabolic acidosis, corrected. Was due to ileostomy losses    CKD stage 3. s Cr stable and is at baseline     GI/Surg: s/p colostomy reversal  Stable hemodynamically  OK to d/c home         Plans and recommendations:  As discussed above  Has f/u in renal clinic             Juan Luis Galeas MD  Nephrology  [facility]

## 2019-01-25 NOTE — PLAN OF CARE
Problem: Adult Inpatient Plan of Care  Goal: Plan of Care Review  Outcome: Ongoing (interventions implemented as appropriate)  Remains free from harm, no c/o pain, positions self, tolerating diet, no acute distress noted. 24 hour chart check complete.

## 2019-01-25 NOTE — SUBJECTIVE & OBJECTIVE
Interval History:  Tolerated a regular diet.  Having bowel movements.  Creatinine is decreasing.  He had some clear drainage from the old drain site.  The wound was probed there was no evidence of feculent drainage    Medications:  Continuous Infusions:  Scheduled Meds:   allopurinol  100 mg Oral Daily    aspirin  81 mg Oral QAM    bimatoprost  1 drop Left Eye QHS    bumetanide  2 mg Oral QAM    chlorhexidine  10 mL Mouth/Throat BID    enoxaparin  40 mg Subcutaneous Daily    lisinopril  5 mg Oral Daily    nozaseptin   Each Nare BID    pantoprazole  40 mg Oral Daily    simvastatin  10 mg Oral QHS    sodium bicarbonate  1,950 mg Oral TID     PRN Meds:dextrose 50%, diphenhydrAMINE, glucagon (human recombinant), HYDROcodone-acetaminophen, HYDROmorphone, insulin aspart U-100, ondansetron, sodium chloride 0.9%     Review of patient's allergies indicates:  No Known Allergies  Objective:     Vital Signs (Most Recent):  Temp: 99.3 °F (37.4 °C) (01/25/19 0728)  Pulse: 97 (01/25/19 0728)  Resp: 16 (01/25/19 0728)  BP: 107/65 (01/25/19 0728)  SpO2: 97 % (01/25/19 0728) Vital Signs (24h Range):  Temp:  [98 °F (36.7 °C)-99.6 °F (37.6 °C)] 99.3 °F (37.4 °C)  Pulse:  [] 97  Resp:  [16-20] 16  SpO2:  [95 %-98 %] 97 %  BP: ()/(60-75) 107/65     Weight: 78.7 kg (173 lb 8 oz)  Body mass index is 26.38 kg/m².    Intake/Output - Last 3 Shifts       01/23 0700 - 01/24 0659 01/24 0700 - 01/25 0659 01/25 0700 - 01/26 0659    P.O. 920 1080     I.V. (mL/kg) 2400 (30.5) 900 (11.4)     Total Intake(mL/kg) 3320 (42.2) 1980 (25.2)     Urine (mL/kg/hr) 250 (0.1)      Stool 0      Total Output 250      Net +3070 +1980            Urine Occurrence 5 x 3 x     Stool Occurrence 1 x            Physical Exam   Constitutional: He is oriented to person, place, and time. No distress.   Chronically ill   HENT:   Head: Normocephalic and atraumatic.   Neck: Normal range of motion. Neck supple.   Cardiovascular: Normal rate and regular  rhythm.   Pulmonary/Chest: Effort normal and breath sounds normal.   Abdominal: Soft. Bowel sounds are normal. He exhibits no distension. There is no tenderness.   Old ileostomy site is clean.  On the left lower quadrant the own drain site has a small opening.  The wound was probed with a Q-tip.  There is no evidence of feculent drainage   Musculoskeletal: He exhibits no edema.   Neurological: He is alert and oriented to person, place, and time.   Skin: Skin is warm. Capillary refill takes less than 2 seconds.   Psychiatric: He has a normal mood and affect. His behavior is normal. Judgment and thought content normal.   Vitals reviewed.      Significant Labs:  CBC:   Recent Labs   Lab 01/23/19  0600   WBC 9.08   RBC 3.57*   HGB 9.4*   HCT 29.8*      MCV 84   MCH 26.3*   MCHC 31.5*     BMP:   Recent Labs   Lab 01/25/19  0330   GLU 95      K 3.6      CO2 23   BUN 15   CREATININE 1.6*   CALCIUM 8.1*       Significant Diagnostics:  none

## 2019-01-25 NOTE — DISCHARGE SUMMARY
[facility] -   General Surgery  Discharge Summary      Patient Name: Yan Choudhury  MRN: [id number]  Admission Date: 1/22/2019  Hospital Length of Stay: 3 days  Discharge Date and Time:  01/25/2019 9:24 AM  Attending Physician: Kevin Oconnor MD   Discharging Provider: Kevin Oconnor MD  Primary Care Provider: Sandra Estevez MD    HPI:   Patient is status post colostomy reversal.  He had a protective ileostomy done due to a leak. A Gastrografin enema showed no evidence of a leak. The drain fell out and was left out.     The patient has had issues with ileostomy output.  The ileostomy output ranges from 500-700 and occasionally 1400 mL a day.  This has created issues with renal failure and hyponatremia.  The patient is admitted now for correction of his hyponatremia and renal failure.  We will check his nutritional status.  Consideration will be given to ileostomy reversal during this admission.  Will ask Cardiology to see him to re-evaluate his cardiac risk says he has been at an increased risk for surgical procedures in the past    He presents now for ileostomy reversal        Procedure(s) (LRB):  CLOSURE, ILEOSTOMY (N/A)      Indwelling Lines/Drains at time of discharge:   Lines/Drains/Airways     Peripherally Inserted Central Catheter Line                 PICC Double Lumen 01/15/19 2240 right brachial 9 days              Hospital Course: Postop day 1.  Incisional pain. No nausea.  Marginal urine output.  Will start clear liquids  POD 2 :  Pain is improved, tolerated clear, voiding it toilet, loose stools  Postop day 3.  Tolerating a diet.  Pain controlled.  Some clear fluid from the old drain site.  The area was probed with a Q-tip.  No evidence of feculent drainage    Consults:   Consults (From admission, onward)        Status Ordering Provider     Inpatient consult to Nephrology  Once     Provider:  Aubrey Seth MD    Acknowledged KEVIN OCONNOR     Inpatient consult to Social Work/Case  Management  Once     Provider:  (Not yet assigned)    RICARDO Zepeda          Significant Diagnostic Studies: Labs:   BMP:   Recent Labs   Lab 01/24/19  0400 01/25/19  0330   GLU 93 95    139   K 3.6 3.6    107   CO2 23 23   BUN 19 15   CREATININE 1.9* 1.6*   CALCIUM 7.9* 8.1*   , CMP   Recent Labs   Lab 01/24/19  0400 01/25/19  0330    139   K 3.6 3.6    107   CO2 23 23   GLU 93 95   BUN 19 15   CREATININE 1.9* 1.6*   CALCIUM 7.9* 8.1*   ALBUMIN  --  2.4*   ANIONGAP 7* 9   ESTGFRAFRICA 42* 51*   EGFRNONAA 36* 45*    and CBC No results for input(s): WBC, HGB, HCT, PLT in the last 48 hours.    Pending Diagnostic Studies:     None        Final Active Diagnoses:    Diagnosis Date Noted POA    PRINCIPAL PROBLEM:  Ileostomy in place [Z93.2] 11/05/2018 Not Applicable    CKD (chronic kidney disease) stage 3, GFR 30-59 ml/min [N18.3] 01/23/2019 Yes    Gastroesophageal reflux disease [K21.9] 11/17/2016 Yes    Biventricular ICD (implantable cardioverter-defibrillator) in place [Z95.810] 04/19/2016 Yes     Chronic    CAD, multiple vessel [I25.10] 07/13/2015 Yes     Chronic    Hypertension associated with diabetes [E11.59, I10]  Yes    Ischemic cardiomyopathy [I25.5]  Yes    Hyperlipidemia associated with type 2 diabetes mellitus [E11.69, E78.5]  Yes      Problems Resolved During this Admission:      Discharged Condition: stable    Disposition: Home-Health [facility]    Follow Up:  Follow-up Information     [facility].    Contact information:  [address]  [place] [address] [phone]             Ricardo Lindsay MD In 2 weeks.    Specialty:  General Surgery  Why:  post op appt  Contact information:  [address]  [place] [address] [phone]                 Patient Instructions:      Lifting restrictions   Order Comments: No lifting more than 30 pounds for 4 weeks     Notify your health care provider if you experience  any of the following:  temperature >100.4     Notify your health care provider if you experience any of the following:  persistent nausea and vomiting or diarrhea     Notify your health care provider if you experience any of the following:  severe uncontrolled pain     Notify your health care provider if you experience any of the following:  redness, tenderness, or signs of infection (pain, swelling, redness, odor or green/yellow discharge around incision site)     Notify your health care provider if you experience any of the following:   Order Comments: Any drainage the old drain site looks like stool or bowel content     Change dressing (specify)   Order Comments: Dressing change:  Moist gauze to ileostomy site once a day.  Ribbon gauze to old drain site the left abdomen once a day     Medications:  Reconciled Home Medications:      Medication List      START taking these medications    nozaseptin nasal   Commonly known as:  NOZIN  1 each by Each Nare route 2 (two) times daily.        CHANGE how you take these medications    bimatoprost 0.03 % ophthalmic drops  Commonly known as:  LUMIGAN  Place 1 drop into the left eye every evening.  What changed:  how to take this     bumetanide 2 MG tablet  Commonly known as:  BUMEX  Take 1 tablet (2 mg total) by mouth 2 (two) times daily.  What changed:  when to take this     diphenoxylate-atropine 2.5-0.025 mg 2.5-0.025 mg per tablet  Commonly known as:  LOMOTIL  Take 1 tablet by mouth 3 (three) times daily.  What changed:  Another medication with the same name was removed. Continue taking this medication, and follow the directions you see here.     lisinopril 5 MG tablet  Commonly known as:  PRINIVIL,ZESTRIL  TAKE 1 TABLET (5 MG TOTAL) BY MOUTH ONCE DAILY.  What changed:  when to take this        CONTINUE taking these medications    allopurinol 100 MG tablet  Commonly known as:  ZYLOPRIM  TAKE 1 TABLET (100 MG TOTAL) BY MOUTH ONCE DAILY.     aspirin 81 MG EC  "tablet  Commonly known as:  ECOTRIN  Take 81 mg by mouth every morning.     BD INSULIN SYRINGE ULTRA-FINE 0.5 mL 31 gauge x 5/16" Syrg  Generic drug:  insulin syringe-needle U-100  USE AS DIRECTED TO INJECT INSULIN TWO TIMES DAILY     blood sugar diagnostic Strp  1 strip by Misc.(Non-Drug; Combo Route) route 4 (four) times daily. [employer]     carvedilol 25 MG tablet  Commonly known as:  COREG  TAKE 1/2 TABLET BY MOUTH TWICE A DAY WITH MEALS     GERITOL ORAL  Take by mouth.     HYDROcodone-acetaminophen 5-325 mg per tablet  Commonly known as:  NORCO  One or 2 every 4-6 hours as needed for pain     insulin glargine 100 unit/mL injection  Commonly known as:  LANTUS  50 units bid prn when BS< 150     lancets Misc  For tel care     pantoprazole 40 MG tablet  Commonly known as:  PROTONIX  TAKE 1 TABLET BY MOUTH EVERY DAY FOR ACID REFLUX     pen needle, diabetic 32 gauge x 5/32" Ndle  Commonly known as:  BD ULTRA-FINE JOSLYN PEN NEEDLE  1 each by Misc.(Non-Drug; Combo Route) route 4 (four) times daily.     simvastatin 10 MG tablet  Commonly known as:  ZOCOR  TAKE 1 TABLET (10 MG TOTAL) BY MOUTH EVERY EVENING.     sodium bicarbonate 650 MG tablet  Take 3 tablets (1,950 mg total) by mouth 3 (three) times daily.     VITAMIN D3 1,000 unit capsule  Generic drug:  cholecalciferol (vitamin D3)  Take 1,000 Units by mouth once daily.          Time spent on the discharge of patient: 5 minutes    Kevin Lindsay MD  General Surgery  [facility]  "

## 2019-01-25 NOTE — SUBJECTIVE & OBJECTIVE
Interval History: Pt was seen and examined. H/o reviewed. Pt is a 62 y/o male with CKD-3, and electrolyte disorders (hyponatremia), secondary to CHF and high ileostomy output presented electively for ileostomy reversal. Pt is s/p the procedure, doing well, no new issues or events overnight, no c/o's, no SOB.    Review of patient's allergies indicates:  No Known Allergies  Current Facility-Administered Medications   Medication Frequency    allopurinol tablet 100 mg Daily    aspirin EC tablet 81 mg QAM    bimatoprost 0.01 % ophthalmic drops 1 drop QHS    bumetanide tablet 2 mg QAM    chlorhexidine 0.12 % solution 10 mL BID    dextrose 50% injection 12.5 g PRN    diphenhydrAMINE capsule 25 mg Q6H PRN    enoxaparin injection 40 mg Daily    glucagon (human recombinant) injection 1 mg PRN    HYDROcodone-acetaminophen 5-325 mg per tablet 1 tablet Q6H PRN    HYDROmorphone injection 1 mg Q4H PRN    insulin aspart U-100 pen 1-10 Units Q6H PRN    lisinopril tablet 5 mg Daily    nozaseptin (NOZIN) nasal  BID    ondansetron disintegrating tablet 8 mg Q8H PRN    pantoprazole EC tablet 40 mg Daily    simvastatin tablet 10 mg QHS    sodium bicarbonate tablet 1,950 mg TID    sodium chloride 0.9% flush 3 mL PRN       Objective:     Vital Signs (Most Recent):  Temp: 99.3 °F (37.4 °C) (01/25/19 0728)  Pulse: 97 (01/25/19 0728)  Resp: 16 (01/25/19 0728)  BP: 107/65 (01/25/19 0728)  SpO2: 97 % (01/25/19 0728)  O2 Device (Oxygen Therapy): room air (01/24/19 1959) Vital Signs (24h Range):  Temp:  [98 °F (36.7 °C)-99.6 °F (37.6 °C)] 99.3 °F (37.4 °C)  Pulse:  [] 97  Resp:  [16-20] 16  SpO2:  [95 %-98 %] 97 %  BP: ()/(60-75) 107/65     Weight: 78.7 kg (173 lb 8 oz) (01/22/19 0943)  Body mass index is 26.38 kg/m².  Body surface area is 1.94 meters squared.    I/O last 3 completed shifts:  In: 3660 [P.O.:1560; I.V.:2100]  Out: -     Physical Exam   Constitutional: He is oriented to person, place, and  time. He appears well-developed and well-nourished. No distress.   Neck: No JVD present.   Cardiovascular: Normal rate, regular rhythm and normal heart sounds. Exam reveals no friction rub.   Pulmonary/Chest: Effort normal and breath sounds normal. He has no rales.   Abdominal: Soft. He exhibits no distension. There is no tenderness.   Musculoskeletal: He exhibits no edema.   Neurological: He is alert and oriented to person, place, and time.   Skin: Skin is warm and dry.   Psychiatric: He has a normal mood and affect. His behavior is normal.       Significant Labs: reviewed  BMP  Lab Results   Component Value Date     01/25/2019    K 3.6 01/25/2019     01/25/2019    CO2 23 01/25/2019    BUN 15 01/25/2019    CREATININE 1.6 (H) 01/25/2019    CALCIUM 8.1 (L) 01/25/2019    ANIONGAP 9 01/25/2019    ESTGFRAFRICA 51 (A) 01/25/2019    EGFRNONAA 45 (A) 01/25/2019         Significant Imaging:  reviewed

## 2019-01-25 NOTE — ASSESSMENT & PLAN NOTE
Postop day 3 from ileostomy reversal.   Tolerated a regular diet.    There was some drainage from his own drain site but no evidence of feculent drainage.  A preop Gastrografin enema and colonoscopy showed no evidence of a leak    Discharged home with home health.  Patient will monitor the drainage of the wound

## 2019-01-25 NOTE — PROGRESS NOTES
Patient has old KINDRA drain site to the left lower abdomen. Started to drain clear yellow liquid. Gauze applied and nothing else draining at this time. Dr Aragon informed oncall doctor.

## 2019-01-25 NOTE — PROGRESS NOTES
[facility] -   General Surgery  Progress Note    Subjective:     History of Present Illness:  Patient is status post colostomy reversal.  He had a protective ileostomy done due to a leak. A Gastrografin enema showed no evidence of a leak. The drain fell out and was left out.     The patient has had issues with ileostomy output.  The ileostomy output ranges from 500-700 and occasionally 1400 mL a day.  This has created issues with renal failure and hyponatremia.  The patient is admitted now for correction of his hyponatremia and renal failure.  We will check his nutritional status.  Consideration will be given to ileostomy reversal during this admission.  Will ask Cardiology to see him to re-evaluate his cardiac risk says he has been at an increased risk for surgical procedures in the past    He presents now for ileostomy reversal        Post-Op Info:  Procedure(s) (LRB):  CLOSURE, ILEOSTOMY (N/A)   3 Days Post-Op     Interval History:  Tolerated a regular diet.  Having bowel movements.  Creatinine is decreasing.  He had some clear drainage from the old drain site.  The wound was probed there was no evidence of feculent drainage    Medications:  Continuous Infusions:  Scheduled Meds:   allopurinol  100 mg Oral Daily    aspirin  81 mg Oral QAM    bimatoprost  1 drop Left Eye QHS    bumetanide  2 mg Oral QAM    chlorhexidine  10 mL Mouth/Throat BID    enoxaparin  40 mg Subcutaneous Daily    lisinopril  5 mg Oral Daily    nozaseptin   Each Nare BID    pantoprazole  40 mg Oral Daily    simvastatin  10 mg Oral QHS    sodium bicarbonate  1,950 mg Oral TID     PRN Meds:dextrose 50%, diphenhydrAMINE, glucagon (human recombinant), HYDROcodone-acetaminophen, HYDROmorphone, insulin aspart U-100, ondansetron, sodium chloride 0.9%     Review of patient's allergies indicates:  No Known Allergies  Objective:     Vital Signs (Most Recent):  Temp: 99.3 °F (37.4 °C) (01/25/19 0728)  Pulse: 97 (01/25/19  0728)  Resp: 16 (01/25/19 0728)  BP: 107/65 (01/25/19 0728)  SpO2: 97 % (01/25/19 0728) Vital Signs (24h Range):  Temp:  [98 °F (36.7 °C)-99.6 °F (37.6 °C)] 99.3 °F (37.4 °C)  Pulse:  [] 97  Resp:  [16-20] 16  SpO2:  [95 %-98 %] 97 %  BP: ()/(60-75) 107/65     Weight: 78.7 kg (173 lb 8 oz)  Body mass index is 26.38 kg/m².    Intake/Output - Last 3 Shifts       01/23 0700 - 01/24 0659 01/24 0700 - 01/25 0659 01/25 0700 - 01/26 0659    P.O. 920 1080     I.V. (mL/kg) 2400 (30.5) 900 (11.4)     Total Intake(mL/kg) 3320 (42.2) 1980 (25.2)     Urine (mL/kg/hr) 250 (0.1)      Stool 0      Total Output 250      Net +3070 +1980            Urine Occurrence 5 x 3 x     Stool Occurrence 1 x            Physical Exam   Constitutional: He is oriented to person, place, and time. No distress.   Chronically ill   HENT:   Head: Normocephalic and atraumatic.   Neck: Normal range of motion. Neck supple.   Cardiovascular: Normal rate and regular rhythm.   Pulmonary/Chest: Effort normal and breath sounds normal.   Abdominal: Soft. Bowel sounds are normal. He exhibits no distension. There is no tenderness.   Old ileostomy site is clean.  On the left lower quadrant the own drain site has a small opening.  The wound was probed with a Q-tip.  There is no evidence of feculent drainage   Musculoskeletal: He exhibits no edema.   Neurological: He is alert and oriented to person, place, and time.   Skin: Skin is warm. Capillary refill takes less than 2 seconds.   Psychiatric: He has a normal mood and affect. His behavior is normal. Judgment and thought content normal.   Vitals reviewed.      Significant Labs:  CBC:   Recent Labs   Lab 01/23/19  0600   WBC 9.08   RBC 3.57*   HGB 9.4*   HCT 29.8*      MCV 84   MCH 26.3*   MCHC 31.5*     BMP:   Recent Labs   Lab 01/25/19  0330   GLU 95      K 3.6      CO2 23   BUN 15   CREATININE 1.6*   CALCIUM 8.1*       Significant Diagnostics:  none    Assessment/Plan:     *  Ileostomy in place    Postop day 3 from ileostomy reversal.   Tolerated a regular diet.    There was some drainage from his own drain site but no evidence of feculent drainage.  A preop Gastrografin enema and colonoscopy showed no evidence of a leak    Discharged home with home health.  Patient will monitor the drainage of the wound     CKD (chronic kidney disease) stage 3, GFR 30-59 ml/min    Nephrology consult monitor    discontinue ivf after current liter of saline is infused  check renal panel in the morning     Gastroesophageal reflux disease    PPI     Biventricular ICD (implantable cardioverter-defibrillator) in place    To      CAD, multiple vessel    Monitor     Hyperlipidemia associated with type 2 diabetes mellitus    Insulin sliding scale     Ischemic cardiomyopathy    Monitor     Hypertension associated with diabetes    Home medication         Kevin Lindsay MD  General Surgery  [facility] [place]

## 2019-01-25 NOTE — ASSESSMENT & PLAN NOTE
"64 y/o male with hyponatremia, high ileostomy out-put and past h/o of CHF:           * Hyponatremia     Hyponatremia: stable and improved     Please note that this pt has chronic hyponatremia (about 130)  This is due to severe heart failure (LV dysfunction with EF 30% as well as RV dysfunction and diastolic CHF)  Therefore there is no indication to "normalize" s Na  s Na will drop low again with CHF     s Na further worsened due to high output from the ileostomy     Hypovolemia was corrected. Pt is now euvolemic.     From the point of view of s Na, pt can be d'/vidya home.   Pt likely in future will need to re-start bumex that he was on, but tight now, he likely does not need it because he is euvolemic           Other issues are:     Metabolic acidosis (normal AG) due to ileostomy losses, improved  Being replaced with bicarbonate 650 mg  On PO dose from 3 tid  S/p IV bicarbonate 1.5 amp      Hyperkalemia:   Previously on ARB. Was stopped. Do not re-start  Has DM. Even without the ARB, had borderline hyperkalemia.  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  Continue bicarbonate PO  Give kayexelate this afternoon  May also give some NS IVF's (watch for CHF)            Plans and recommendations:  As discussed above        "

## 2019-01-25 NOTE — PLAN OF CARE
Problem: Adult Inpatient Plan of Care  Goal: Plan of Care Review  Outcome: Outcome(s) achieved Date Met: 01/25/19  Pain controlled. MD visit. Discharge today.

## 2019-01-25 NOTE — DISCHARGE INSTRUCTIONS
Please call for any fever, increase in pain, nausea or vomiting or redness or drainage from incision(s).    No lifting more than 30 pounds for4 weeks    May shower     If you become constipated from the pain medication you can use over the counter laxatives,  Miralax or Glycolax, or Magnesium Citrate for severe constipation.    Monitor the area where the drain was.  If there is anything that looks like stool coming from it please let us know    Please make sure that the ileostomy site has packing placed daily and that the old drain site has ribbon gauze placed in the opening  daily

## 2019-01-25 NOTE — NURSING
PICC line removed.Tip intact and measured 36 cm. Discharge instructions given to pt and wife. Verbalized understanding. Dressing change instructions given. Pt and wife verbalized comfortable with daily dressing changes along with Home health.

## 2019-01-31 ENCOUNTER — TELEPHONE (OUTPATIENT)
Dept: CARDIOLOGY | Facility: CLINIC | Age: 64
End: 2019-01-31

## 2019-01-31 ENCOUNTER — OFFICE VISIT (OUTPATIENT)
Dept: CARDIOLOGY | Facility: CLINIC | Age: 64
End: 2019-01-31
Payer: MEDICARE

## 2019-01-31 VITALS
WEIGHT: 179.25 LBS | HEART RATE: 80 BPM | DIASTOLIC BLOOD PRESSURE: 60 MMHG | SYSTOLIC BLOOD PRESSURE: 92 MMHG | BODY MASS INDEX: 27.17 KG/M2 | HEIGHT: 68 IN

## 2019-01-31 DIAGNOSIS — I25.5 ISCHEMIC CARDIOMYOPATHY: ICD-10-CM

## 2019-01-31 DIAGNOSIS — I50.42 CHRONIC COMBINED SYSTOLIC AND DIASTOLIC CONGESTIVE HEART FAILURE: Primary | ICD-10-CM

## 2019-01-31 DIAGNOSIS — E78.5 HYPERLIPIDEMIA ASSOCIATED WITH TYPE 2 DIABETES MELLITUS: ICD-10-CM

## 2019-01-31 DIAGNOSIS — I25.10 CAD, MULTIPLE VESSEL: Chronic | ICD-10-CM

## 2019-01-31 DIAGNOSIS — I44.7 LBBB (LEFT BUNDLE BRANCH BLOCK): ICD-10-CM

## 2019-01-31 DIAGNOSIS — E11.59 HYPERTENSION ASSOCIATED WITH DIABETES: ICD-10-CM

## 2019-01-31 DIAGNOSIS — Z90.49 S/P PARTIAL COLECTOMY: ICD-10-CM

## 2019-01-31 DIAGNOSIS — N18.4 CKD (CHRONIC KIDNEY DISEASE) STAGE 4, GFR 15-29 ML/MIN: ICD-10-CM

## 2019-01-31 DIAGNOSIS — E11.69 HYPERLIPIDEMIA ASSOCIATED WITH TYPE 2 DIABETES MELLITUS: ICD-10-CM

## 2019-01-31 DIAGNOSIS — Z95.810 BIVENTRICULAR ICD (IMPLANTABLE CARDIOVERTER-DEFIBRILLATOR) IN PLACE: Chronic | ICD-10-CM

## 2019-01-31 DIAGNOSIS — I50.9 ACUTE ON CHRONIC CONGESTIVE HEART FAILURE: ICD-10-CM

## 2019-01-31 DIAGNOSIS — Z98.890 HISTORY OF COLOSTOMY REVERSAL: ICD-10-CM

## 2019-01-31 DIAGNOSIS — I15.2 HYPERTENSION ASSOCIATED WITH DIABETES: ICD-10-CM

## 2019-01-31 DIAGNOSIS — I27.20 PULMONARY HTN: ICD-10-CM

## 2019-01-31 DIAGNOSIS — Z93.2 ILEOSTOMY IN PLACE: ICD-10-CM

## 2019-01-31 PROCEDURE — 99214 PR OFFICE/OUTPT VISIT, EST, LEVL IV, 30-39 MIN: ICD-10-PCS | Mod: S$PBB,,, | Performed by: NURSE PRACTITIONER

## 2019-01-31 PROCEDURE — 99214 OFFICE O/P EST MOD 30 MIN: CPT | Mod: S$PBB,,, | Performed by: NURSE PRACTITIONER

## 2019-01-31 PROCEDURE — 99999 PR PBB SHADOW E&M-EST. PATIENT-LVL IV: CPT | Mod: PBBFAC,,, | Performed by: NURSE PRACTITIONER

## 2019-01-31 PROCEDURE — 99999 PR PBB SHADOW E&M-EST. PATIENT-LVL IV: ICD-10-PCS | Mod: PBBFAC,,, | Performed by: NURSE PRACTITIONER

## 2019-01-31 PROCEDURE — 99214 OFFICE O/P EST MOD 30 MIN: CPT | Mod: PBBFAC,PN | Performed by: NURSE PRACTITIONER

## 2019-01-31 RX ORDER — BUMETANIDE 2 MG/1
TABLET ORAL
Qty: 75 TABLET | Refills: 11 | Status: ON HOLD
Start: 2019-01-31 | End: 2019-04-04 | Stop reason: SDUPTHER

## 2019-01-31 NOTE — PROGRESS NOTES
Subjective:   Patient ID:  Yan Choudhury is a 63 y.o. male who presents for follow up of Congestive Heart Failure      HPI  Mr. Choudhury presents to clinic for follow up and management of CHF.   His current medical conditions include CAD/ICM, DCM, LBBB, HTN, CRI, DM, CRT-ICD, PTHN, dyslipidemia, obesity. Nonsmoker.   Past hx pertinent for following:  LHC was done 2009 showed diffuse CAD, and severe distal CAD involving apical LAD, distal RCA/distal LCX.   Echo Oct 2010 showed LVEF 25%.   PET stress 2016 showed inferior scar, no significant ischemia noted and LVEF < 35%.   s/p BIV ICD 3/16 for CHF/LBBB.  Echo 12/17 EF 20%, DD, mod PHTN.   Echo 10/18 EF read as 30%, mild-mod AI, DD, RVE, LVE.  He is s/p colostomy reversal/parastomal repair then needed ileostomy. Ileostomy has been reversed. Had diarrhea post reversal, but stools now formed.     Had issues with electrolyte imbalance and BROOKE due to high ileostomy output over last few admissions.   He has had multiple abdominal surgeries over last few years and has not had any acute cardiac events with surgery  He denies dyspnea, pnd/orthopnea, chest pain or angina equivalent, Has no ICD firing. Has no dizziness near syncope or syncope. Had lost 30 lbs by the time he saw Dr. Calvillo, but weight up 8 lbs since visit earlier this month. Appetite has improved. States that he has been feeling much better. BP stable with holding Coreg due to hypotension on visit with Dr. Calvillo a few weeks ago.   Creatine stable at 1.6 last week. Has been alternating Bumex 1mg and 2mg daily. Is still following closely with Nephrology.       Past Medical History:   Diagnosis Date    Arthritis     CAD (coronary artery disease)     Cataract     CHF (congestive heart failure)     Dr Calvillo    Chronic combined systolic and diastolic congestive heart failure 3/24/2015    CKD (chronic kidney disease), stage III     Diabetes mellitus type II      AM    Diabetic retinopathy     anti Veg F  injections for macular edema    Diverticulitis of colon     ED (erectile dysfunction)     Encounter for blood transfusion     Glaucoma     HTN (hypertension)     Hyperlipidemia     Ischemic cardiomyopathy     Obesity     JAHAIRA on CPAP     Pacemaker     Pulmonary HTN        Past Surgical History:   Procedure Laterality Date    CARDIAC CATHETERIZATION  2009    CHOLECYSTECTOMY      CLOSURE, ILEOSTOMY N/A 1/22/2019    [facility]    COLECTOMY-SIGMOID N/A 4/22/2016    [facility]    COLONOSCOPY N/A 10/11/2018    [facility] END[facility]    COLOSTOMY N/A 4/22/2016    [facility]    CREATION, ILEOSTOMY N/A 10/30/2018    [facility]    EYE SURGERY      HEMICOLECTOMY, RIGHT Right 10/30/2018    [facility]    KNEE SURGERY      MOBILIZATION, SPLENIC FLEXURE N/A 10/30/2018    [facility]    REVISION OR CLOSURE, COLOSTOMY N/A 10/30/2018    [facility]    SIGMOIDOSCOPY, FLEXIBLE N/A 1/18/2019    [facility] END[facility]       Social History     Tobacco Use    Smoking status: Never Smoker    Smokeless tobacco: Never Used   Substance Use Topics    Alcohol use: No     Alcohol/week: 0.0 oz     Frequency: Never    Drug use: No       Family History   Problem Relation Age of Onset    Cancer Mother     Heart disease Father     Stroke Father     No Known Problems Sister     No Known Problems Brother     No Known Problems Maternal Aunt     No Known Problems Maternal Uncle     No Known Problems Paternal Aunt     No Known Problems Paternal Uncle     No Known Problems Maternal Grandmother     No Known Problems Maternal Grandfather     No Known Problems Paternal Grandmother     No Known Problems Paternal Grandfather     Amblyopia  Neg Hx     Blindness Neg Hx     Cataracts Neg Hx     Diabetes Neg Hx     Glaucoma Neg Hx     Hypertension Neg Hx     Macular degeneration Neg Hx     Retinal detachment Neg Hx     Strabismus Neg Hx     Thyroid disease Neg Hx        Current Outpatient Medications   Medication Sig    allopurinol (ZYLOPRIM) 100 MG tablet TAKE 1 TABLET (100 MG TOTAL) BY MOUTH ONCE DAILY.    aspirin (ECOTRIN) 81 MG EC tablet Take 81 mg by mouth every morning.     BD INSULIN SYRINGE ULTRA-FINE 0.5 mL 31 gauge x 5/16 Syrg USE AS DIRECTED TO INJECT INSULIN TWO TIMES DAILY    bimatoprost (LUMIGAN) 0.03 % ophthalmic drops Place 1 drop into the left eye every evening. (Patient taking differently: Place 1 drop into the right eye every evening. )    blood sugar diagnostic Strp 1 strip by Misc.(Non-Drug; Combo Route) route 4 (four) times daily. [employer]    bumetanide (BUMEX) 2 MG tablet Take 1 tablet (2 mg total) by mouth 2 (two) times daily. (Patient taking differently: Take 2 mg by mouth every morning. )    carvedilol (COREG) 25 MG tablet TAKE 1/2 TABLET BY MOUTH TWICE A DAY WITH MEALS    cholecalciferol, vitamin D3, (VITAMIN D3) 1,000 unit capsule Take 1,000 Units by mouth once daily.    diphenoxylate-atropine 2.5-0.025 mg (LOMOTIL) 2.5-0.025 mg per tablet Take 1 tablet by mouth 3 (three) times daily.    HYDROcodone-acetaminophen (NORCO) 5-325 mg per tablet One or 2 every 4-6 hours as needed for pain    insulin glargine (LANTUS) 100 unit/mL injection 50 units bid prn when BS< 150    IRON/VITAMIN B COMPLEX (GERITOL ORAL) Take by mouth.    lancets Misc For tel care    lisinopril (PRINIVIL,ZESTRIL) 5 MG tablet TAKE 1 TABLET (5 MG TOTAL) BY MOUTH ONCE DAILY. (Patient taking differently: Take 5 mg by mouth every morning. )    nozaseptin (NOZIN) nasal  1 each by Each Nare route 2 (two) times daily.    pantoprazole (PROTONIX) 40 MG tablet TAKE 1 TABLET BY MOUTH EVERY DAY FOR ACID REFLUX    pen needle, diabetic (BD  "ULTRA-FINE JOSLYN PEN NEEDLE) 32 gauge x 5/32" Ndle 1 each by Misc.(Non-Drug; Combo Route) route 4 (four) times daily.    simvastatin (ZOCOR) 10 MG tablet TAKE 1 TABLET (10 MG TOTAL) BY MOUTH EVERY EVENING.    sodium bicarbonate 650 MG tablet Take 3 tablets (1,950 mg total) by mouth 3 (three) times daily.     No current facility-administered medications for this visit.      Current Outpatient Medications on File Prior to Visit   Medication Sig    allopurinol (ZYLOPRIM) 100 MG tablet TAKE 1 TABLET (100 MG TOTAL) BY MOUTH ONCE DAILY.    aspirin (ECOTRIN) 81 MG EC tablet Take 81 mg by mouth every morning.     BD INSULIN SYRINGE ULTRA-FINE 0.5 mL 31 gauge x 5/16 Syrg USE AS DIRECTED TO INJECT INSULIN TWO TIMES DAILY    bimatoprost (LUMIGAN) 0.03 % ophthalmic drops Place 1 drop into the left eye every evening. (Patient taking differently: Place 1 drop into the right eye every evening. )    blood sugar diagnostic Strp 1 strip by Misc.(Non-Drug; Combo Route) route 4 (four) times daily. [facility]    bumetanide (BUMEX) 2 MG tablet Take 1 tablet (2 mg total) by mouth 2 (two) times daily. (Patient taking differently: Take 2 mg by mouth every morning. )    carvedilol (COREG) 25 MG tablet TAKE 1/2 TABLET BY MOUTH TWICE A DAY WITH MEALS    cholecalciferol, vitamin D3, (VITAMIN D3) 1,000 unit capsule Take 1,000 Units by mouth once daily.    diphenoxylate-atropine 2.5-0.025 mg (LOMOTIL) 2.5-0.025 mg per tablet Take 1 tablet by mouth 3 (three) times daily.    HYDROcodone-acetaminophen (NORCO) 5-325 mg per tablet One or 2 every 4-6 hours as needed for pain    insulin glargine (LANTUS) 100 unit/mL injection 50 units bid prn when BS< 150    IRON/VITAMIN B COMPLEX (GERITOL ORAL) Take by mouth.    lancets Misc For tel care    lisinopril (PRINIVIL,ZESTRIL) 5 MG tablet TAKE 1 TABLET (5 MG TOTAL) BY MOUTH ONCE DAILY. (Patient taking differently: Take 5 mg by mouth every morning. )    nozaseptin (NOZIN) nasal  1 each by " "Each Nare route 2 (two) times daily.    pantoprazole (PROTONIX) 40 MG tablet TAKE 1 TABLET BY MOUTH EVERY DAY FOR ACID REFLUX    pen needle, diabetic (BD ULTRA-FINE JOSLYN PEN NEEDLE) 32 gauge x 5/32" Ndle 1 each by Misc.(Non-Drug; Combo Route) route 4 (four) times daily.    simvastatin (ZOCOR) 10 MG tablet TAKE 1 TABLET (10 MG TOTAL) BY MOUTH EVERY EVENING.    sodium bicarbonate 650 MG tablet Take 3 tablets (1,950 mg total) by mouth 3 (three) times daily.     No current facility-administered medications on file prior to visit.        Review of Systems   Constitution: Negative for diaphoresis, weakness, malaise/fatigue, weight gain and weight loss.   HENT: Negative for congestion and nosebleeds.    Cardiovascular: Negative for chest pain, claudication, cyanosis, dyspnea on exertion, irregular heartbeat, leg swelling, near-syncope, orthopnea, palpitations, paroxysmal nocturnal dyspnea and syncope.   Respiratory: Negative for cough, hemoptysis, shortness of breath, sleep disturbances due to breathing, snoring, sputum production and wheezing.    Hematologic/Lymphatic: Negative for bleeding problem. Does not bruise/bleed easily.   Skin: Negative for rash.   Musculoskeletal: Positive for arthritis. Negative for back pain, falls, joint pain, muscle cramps and muscle weakness.        Using walking cane    Gastrointestinal: Negative for abdominal pain, constipation, diarrhea, heartburn, hematemesis, hematochezia, melena and nausea.   Genitourinary: Negative for dysuria, hematuria and nocturia.   Neurological: Negative for excessive daytime sleepiness, dizziness, headaches, light-headedness, loss of balance, numbness and vertigo.   Psychiatric/Behavioral: Positive for memory loss.       Objective:   Physical Exam   Constitutional: He is oriented to person, place, and time. He appears well-developed and well-nourished.   HENT:   Head: Normocephalic.   Neck: Normal range of motion. Neck supple. Normal carotid pulses, no " "hepatojugular reflux and no JVD present. Carotid bruit is not present. No thyromegaly present.   Cardiovascular: Normal rate, regular rhythm, S1 normal, S2 normal, normal heart sounds and normal pulses. PMI is not displaced. Exam reveals no S3, no S4, no distant heart sounds, no friction rub, no midsystolic click and no opening snap.   No murmur heard.  Pulses:       Radial pulses are 2+ on the right side, and 2+ on the left side.   Pulmonary/Chest: Effort normal and breath sounds normal. No respiratory distress. He has no wheezes. He has no rales.   Left ICD pocket    Abdominal: Soft. Bowel sounds are normal. He exhibits no distension, no abdominal bruit and no mass. There is no tenderness.   Multiple surgical scars noted to abdomen    Musculoskeletal: He exhibits no edema or tenderness.   Neurological: He is alert and oriented to person, place, and time.   Skin: Skin is warm and dry. No rash noted.   Psychiatric: He has a normal mood and affect. His behavior is normal.   Nursing note and vitals reviewed.    Vitals:    01/31/19 1033   BP: 92/60   BP Location: Right arm   Patient Position: Sitting   BP Method: Large (Manual)   Pulse: 80   Weight: 81.3 kg (179 lb 3.7 oz)   Height: 5' 8" (1.727 m)     Lab Results   Component Value Date    CHOL 91 (L) 11/16/2018    CHOL 105 (L) 05/02/2018    CHOL 105 (L) 05/02/2018     Lab Results   Component Value Date    HDL 21 (L) 11/16/2018    HDL 28 (L) 05/02/2018    HDL 28 (L) 05/02/2018     Lab Results   Component Value Date    LDLCALC 46.2 (L) 11/16/2018    LDLCALC 52.8 (L) 05/02/2018    LDLCALC 52.8 (L) 05/02/2018     Lab Results   Component Value Date    TRIG 119 11/16/2018    TRIG 121 05/02/2018    TRIG 121 05/02/2018     Lab Results   Component Value Date    CHOLHDL 23.1 11/16/2018    CHOLHDL 26.7 05/02/2018    CHOLHDL 26.7 05/02/2018       Chemistry        Component Value Date/Time     01/25/2019 0330    K 3.6 01/25/2019 0330     01/25/2019 0330    CO2 23 " 01/25/2019 0330    BUN 15 01/25/2019 0330    CREATININE 1.6 (H) 01/25/2019 0330    GLU 95 01/25/2019 0330        Component Value Date/Time    CALCIUM 8.1 (L) 01/25/2019 0330    ALKPHOS 104 01/14/2019 1440    AST 33 01/14/2019 1440    ALT 36 01/14/2019 1440    BILITOT 0.5 01/14/2019 1440    ESTGFRAFRICA 51 (A) 01/25/2019 0330    EGFRNONAA 45 (A) 01/25/2019 0330          Lab Results   Component Value Date    TSH 1.506 11/16/2018     Lab Results   Component Value Date    INR 1.1 11/16/2018    INR 1.3 (H) 12/21/2017    INR 1.1 04/19/2017     Lab Results   Component Value Date    WBC 9.08 01/23/2019    HGB 9.4 (L) 01/23/2019    HCT 29.8 (L) 01/23/2019    MCV 84 01/23/2019     01/23/2019     BMP  Sodium   Date Value Ref Range Status   01/25/2019 139 136 - 145 mmol/L Final     Potassium   Date Value Ref Range Status   01/25/2019 3.6 3.5 - 5.1 mmol/L Final     Chloride   Date Value Ref Range Status   01/25/2019 107 95 - 110 mmol/L Final     CO2   Date Value Ref Range Status   01/25/2019 23 23 - 29 mmol/L Final     BUN, Bld   Date Value Ref Range Status   01/25/2019 15 8 - 23 mg/dL Final     Creatinine   Date Value Ref Range Status   01/25/2019 1.6 (H) 0.5 - 1.4 mg/dL Final     Calcium   Date Value Ref Range Status   01/25/2019 8.1 (L) 8.7 - 10.5 mg/dL Final     Anion Gap   Date Value Ref Range Status   01/25/2019 9 8 - 16 mmol/L Final     eGFR if    Date Value Ref Range Status   01/25/2019 51 (A) >60 mL/min/1.73 m^2 Final     eGFR if non    Date Value Ref Range Status   01/25/2019 45 (A) >60 mL/min/1.73 m^2 Final     Comment:     Calculation used to obtain the estimated glomerular filtration  rate (eGFR) is the CKD-EPI equation.        Estimated Creatinine Clearance: 44.5 mL/min (A) (based on SCr of 1.6 mg/dL (H)).    Assessment:     1. Chronic combined systolic and diastolic congestive heart failure    2. Pulmonary HTN    3. Biventricular ICD (implantable cardioverter-defibrillator)  in place    4. CAD, multiple vessel    5. Hyperlipidemia associated with type 2 diabetes mellitus    6. Hypertension associated with diabetes    7. Ischemic cardiomyopathy    8. LBBB (left bundle branch block)    9. CKD (chronic kidney disease) stage 4, GFR 15-29 ml/min    10. Ileostomy in place    11. S/P partial colectomy    12. History of colostomy reversal      Renal function stable  Has been alternating Bumex 2mg and 1mg daily   BP still on the low side despite being off Coreg.  Has no CNS Complaints to suggest TIA or CVA  Has no evidence of volume overload on exam. Weight up 8 lbs from earlier this month, but has increased appetite. He states that he is feeling much better than he has in months  Has no ICD firing    Plan:   Continue to alternate Bumex 2mg and 1mg daily   Heart healthy diet  Limit fluid intake 50-60 oz   Daily weights and to notify clinic if weight increases by more than 3 lbs in 1 day or 5 lbs in 1 week.   Exercise routine as tolerated  Follow up as scheduled with Nephrology   RTC in 1 month for close follow up in CHF clinic

## 2019-02-11 ENCOUNTER — OFFICE VISIT (OUTPATIENT)
Dept: SURGERY | Facility: CLINIC | Age: 64
End: 2019-02-11
Payer: MEDICARE

## 2019-02-11 ENCOUNTER — OFFICE VISIT (OUTPATIENT)
Dept: OPHTHALMOLOGY | Facility: CLINIC | Age: 64
End: 2019-02-11
Payer: MEDICARE

## 2019-02-11 VITALS
HEART RATE: 102 BPM | BODY MASS INDEX: 28.26 KG/M2 | TEMPERATURE: 98 F | SYSTOLIC BLOOD PRESSURE: 129 MMHG | DIASTOLIC BLOOD PRESSURE: 79 MMHG | WEIGHT: 185.88 LBS

## 2019-02-11 DIAGNOSIS — Z09 POSTOP CHECK: Primary | ICD-10-CM

## 2019-02-11 DIAGNOSIS — H40.1122 PRIMARY OPEN ANGLE GLAUCOMA (POAG) OF LEFT EYE, MODERATE STAGE: ICD-10-CM

## 2019-02-11 PROBLEM — K63.1 PERFORATED SIGMOID COLON: Status: RESOLVED | Noted: 2018-11-06 | Resolved: 2019-02-11

## 2019-02-11 PROBLEM — S31.109A OPEN WOUND OF ANTERIOR ABDOMINAL WALL WITH COMPLICATION: Status: RESOLVED | Noted: 2018-11-01 | Resolved: 2019-02-11

## 2019-02-11 PROBLEM — E87.5 HYPERKALEMIA: Status: RESOLVED | Noted: 2018-12-29 | Resolved: 2019-02-11

## 2019-02-11 PROBLEM — N17.9 ACUTE RENAL FAILURE: Status: RESOLVED | Noted: 2018-12-12 | Resolved: 2019-02-11

## 2019-02-11 PROBLEM — K91.89 GASTROINTESTINAL ANASTOMOTIC LEAK: Status: RESOLVED | Noted: 2018-11-16 | Resolved: 2019-02-11

## 2019-02-11 PROBLEM — Z93.2 ILEOSTOMY IN PLACE: Status: RESOLVED | Noted: 2018-11-05 | Resolved: 2019-02-11

## 2019-02-11 PROBLEM — E87.1 HYPONATREMIA: Status: RESOLVED | Noted: 2018-12-28 | Resolved: 2019-02-11

## 2019-02-11 PROBLEM — T81.9XXA COMPLICATION OF SURGICAL PROCEDURE: Status: RESOLVED | Noted: 2018-12-11 | Resolved: 2019-02-11

## 2019-02-11 PROCEDURE — 99024 PR POST-OP FOLLOW-UP VISIT: ICD-10-PCS | Mod: POP,,, | Performed by: SURGERY

## 2019-02-11 PROCEDURE — 92012 PR EYE EXAM, EST PATIENT,INTERMED: ICD-10-PCS | Mod: S$PBB,,, | Performed by: OPHTHALMOLOGY

## 2019-02-11 PROCEDURE — 99999 PR PBB SHADOW E&M-EST. PATIENT-LVL IV: ICD-10-PCS | Mod: PBBFAC,,, | Performed by: SURGERY

## 2019-02-11 PROCEDURE — 99999 PR PBB SHADOW E&M-EST. PATIENT-LVL IV: CPT | Mod: PBBFAC,,, | Performed by: SURGERY

## 2019-02-11 PROCEDURE — 99212 OFFICE O/P EST SF 10 MIN: CPT | Mod: PBBFAC,PN,27,25 | Performed by: OPHTHALMOLOGY

## 2019-02-11 PROCEDURE — 99214 OFFICE O/P EST MOD 30 MIN: CPT | Mod: PBBFAC,25 | Performed by: SURGERY

## 2019-02-11 PROCEDURE — 92133 POSTERIOR SEGMENT OCT OPTIC NERVE(OCULAR COHERENCE TOMOGRAPHY) - OU - BOTH EYES: ICD-10-PCS | Mod: 26,S$PBB,, | Performed by: OPHTHALMOLOGY

## 2019-02-11 PROCEDURE — 92012 INTRM OPH EXAM EST PATIENT: CPT | Mod: S$PBB,,, | Performed by: OPHTHALMOLOGY

## 2019-02-11 PROCEDURE — 92133 CPTRZD OPH DX IMG PST SGM ON: CPT | Mod: PBBFAC,PN | Performed by: OPHTHALMOLOGY

## 2019-02-11 PROCEDURE — 99999 PR PBB SHADOW E&M-EST. PATIENT-LVL II: ICD-10-PCS | Mod: PBBFAC,,, | Performed by: OPHTHALMOLOGY

## 2019-02-11 PROCEDURE — 99999 PR PBB SHADOW E&M-EST. PATIENT-LVL II: CPT | Mod: PBBFAC,,, | Performed by: OPHTHALMOLOGY

## 2019-02-11 PROCEDURE — 99024 POSTOP FOLLOW-UP VISIT: CPT | Mod: POP,,, | Performed by: SURGERY

## 2019-02-11 RX ORDER — BIMATOPROST 0.3 MG/ML
1 SOLUTION/ DROPS OPHTHALMIC NIGHTLY
Qty: 2.5 ML | Refills: 11 | Status: SHIPPED | OUTPATIENT
Start: 2019-02-11 | End: 2019-05-17 | Stop reason: SDUPTHER

## 2019-02-11 NOTE — PATIENT INSTRUCTIONS
There are no limits on your activity    It is okay to shower and get the areas wet.    Please use a little bit of antibiotic ointment at the drain site and the old ileostomy site

## 2019-02-11 NOTE — PROGRESS NOTES
Subjective:       Patient ID: Yan Choudhury is a 63 y.o. male.    Post ileostomy reversal    Chief Complaint: No chief complaint on file.    Patient had an ileostomy that was done to protect a anastomosis of the colon to the rectum after colostomy takedown.  He was requiring frequent admissions to the hospital.  He recently underwent an ileostomy reversal.  He states he is doing fine.  Pain is controlled.  There is little to no drainage from the old drain site.  His tolerating a diet.  He may have 2-3 loose bowel movements a day but these are becoming more formed.    He has not required readmission      Review of Systems   Gastrointestinal: Negative.        Objective:      Physical Exam   Constitutional: No distress.   Slightly chronically ill   Cardiovascular: Normal rate, regular rhythm and normal heart sounds.   Pulmonary/Chest: Effort normal and breath sounds normal.   Abdominal: Soft. Bowel sounds are normal.   The old ileostomy site is healing.  Staples were removed.  The midline incision is well healed.    The left lower quadrant drain site has no evidence of feculent drainage.  There is a slight bit of purulent drainage.   Vitals reviewed.    FINAL PATHOLOGIC DIAGNOSIS  Ileum:  Segment of benign small bowel with patchy acute inflammation and surface erosion consistent with ileostomy.  Negative for dysplasia and malignancy  Assessment:      doing well after ileostomy reversal     Plan:     continue local wound care.  Monitor for loose stool.  Follow up in 4 weeks

## 2019-02-11 NOTE — PROGRESS NOTES
"===============================  02/11/2019   Yan Choudhury,   63 y.o. male   Last visit [facility]: :7/13/2018   Last visit eye dept. Visit date not found  [facility]:  Corrected distance visual acuity was not recorded in the right eye and 20/40 -2 in the left eye.  Tonometry     Tonometry (Applanation, 1:21 PM)       Right Left    Pressure  15              Wearing Rx     Wearing Rx       Sphere Cylinder Axis Add    Right Balance       Left -2.50 +1.00 075 +2.50    Type:  Bifocal              Manifest Refraction     Manifest Refraction       Sphere Cylinder Axis Dist [facility]    Right        Left -3.25 +1.25 075 20/40              Chief Complaint   Patient presents with    Glaucoma     overdue IOP check, Lumigan QHS OS. pt states his vision has been blurry since his last visit. harder to see road signs when driving. using his Lumigan drops as directed.      Ophthalmic Medications     Ophthalmic-Intraocular Pressure Reducing Agents, Prostaglandin Analogs Start End    bimatoprost (LUMIGAN) 0.03 % ophthalmic drops 2/11/2019 2/11/2020    Sig: Place 1 drop into the left eye every evening.    Route: Left Eye    bimatoprost (LUMIGAN) 0.03 % ophthalmic drops (Discontinued) 5/18/2018 2/11/2019    Sig: Place 1 drop into the left eye every evening.    Route: Left Eye    Reason for Discontinue: Reorder         HPI     Glaucoma      Additional comments: overdue IOP check, Lumigan QHS OS. pt states his   vision has been blurry since his last visit. harder to see road signs when   driving. using his Lumigan drops as directed.               Comments     --DM since 2000  --H/O BDR and DME OS  H/o Av and Shiva 2014  --Focal OS June 2011  --VRTX/VMA OS "would not expect improvement with treatment secondary to   VRTX"  --Prosthesis OD secondary to trauma  --COAG   Resumed xalatan ou qhs 12/11/17  Tmax 25  (-1)  Gonio Open  Low RNFL  Last VF Bjerrum OS 12/11/17    Lumigan QHS OS          Last edited by Dimitrios Gonzalez on 2/11/2019  1:23 PM. (History) "          ________________  2/11/2019  Problem List Items Addressed This Visit        Eye/Vision problems    Primary open-angle glaucoma, moderate stage    Relevant Medications    bimatoprost (LUMIGAN) 0.03 % ophthalmic drops    Other Relevant Orders    Posterior Segment OCT Optic Nerve- Both eyes (Completed)        od prsotesis  .GOCT today  Oct ok  incr thinning - no me  Sp avgf t xos     Tm ax 25  cct -1   Cd 0.7   d ovf 3 momths  C;d .6  t 14   mon mion 1 doroteo   rtc 3 momnts c Vf  No dr         ===============================

## 2019-02-13 ENCOUNTER — TELEPHONE (OUTPATIENT)
Dept: OPHTHALMOLOGY | Facility: CLINIC | Age: 64
End: 2019-02-13

## 2019-02-13 NOTE — TELEPHONE ENCOUNTER
----- Message from Belinda Henriquez sent at 2/13/2019  2:13 PM CST -----  Contact: pt  [phone] would like to speak with staff about patient eyedrops please call today thanks

## 2019-02-25 ENCOUNTER — CLINICAL SUPPORT (OUTPATIENT)
Dept: CARDIOLOGY | Facility: CLINIC | Age: 64
End: 2019-02-25
Attending: INTERNAL MEDICINE
Payer: MEDICARE

## 2019-02-25 DIAGNOSIS — I50.42 CHRONIC COMBINED SYSTOLIC AND DIASTOLIC HEART FAILURE: ICD-10-CM

## 2019-02-25 DIAGNOSIS — Z95.810 ICD (IMPLANTABLE CARDIOVERTER-DEFIBRILLATOR) IN PLACE: ICD-10-CM

## 2019-02-25 DIAGNOSIS — I25.5 ISCHEMIC CARDIOMYOPATHY: ICD-10-CM

## 2019-02-25 PROCEDURE — 93295 DEV INTERROG REMOTE 1/2/MLT: CPT | Mod: ,,, | Performed by: INTERNAL MEDICINE

## 2019-02-25 PROCEDURE — 93295 ICD REMOTE: ICD-10-PCS | Mod: ,,, | Performed by: INTERNAL MEDICINE

## 2019-02-25 PROCEDURE — 93296 REM INTERROG EVL PM/IDS: CPT | Mod: PBBFAC,PN | Performed by: INTERNAL MEDICINE

## 2019-02-25 PROCEDURE — 93297 ICD REMOTE: ICD-10-PCS | Mod: ,,, | Performed by: INTERNAL MEDICINE

## 2019-02-25 PROCEDURE — 93297 REM INTERROG DEV EVAL ICPMS: CPT | Mod: ,,, | Performed by: INTERNAL MEDICINE

## 2019-02-26 ENCOUNTER — LAB VISIT (OUTPATIENT)
Dept: LAB | Facility: HOSPITAL | Age: 64
End: 2019-02-26
Attending: INTERNAL MEDICINE
Payer: MEDICARE

## 2019-02-26 DIAGNOSIS — N18.30 CKD (CHRONIC KIDNEY DISEASE) STAGE 3, GFR 30-59 ML/MIN: ICD-10-CM

## 2019-02-26 LAB
BACTERIA #/AREA URNS HPF: ABNORMAL /HPF
BILIRUB UR QL STRIP: NEGATIVE
CLARITY UR: CLEAR
COLOR UR: YELLOW
CREAT UR-MCNC: 185 MG/DL
GLUCOSE UR QL STRIP: NEGATIVE
HGB UR QL STRIP: ABNORMAL
HYALINE CASTS #/AREA URNS LPF: 4 /LPF
KETONES UR QL STRIP: NEGATIVE
LEUKOCYTE ESTERASE UR QL STRIP: NEGATIVE
MICROSCOPIC COMMENT: ABNORMAL
NITRITE UR QL STRIP: NEGATIVE
PH UR STRIP: 7 [PH] (ref 5–8)
PROT UR QL STRIP: ABNORMAL
PROT UR-MCNC: 201 MG/DL
PROT/CREAT UR: 1.09 MG/G{CREAT}
RBC #/AREA URNS HPF: 3 /HPF (ref 0–4)
SP GR UR STRIP: 1.01 (ref 1–1.03)
SQUAMOUS #/AREA URNS HPF: 2 /HPF
URN SPEC COLLECT METH UR: ABNORMAL
WBC #/AREA URNS HPF: 0 /HPF (ref 0–5)

## 2019-02-26 PROCEDURE — 81000 URINALYSIS NONAUTO W/SCOPE: CPT

## 2019-02-26 PROCEDURE — 84156 ASSAY OF PROTEIN URINE: CPT

## 2019-03-05 ENCOUNTER — OFFICE VISIT (OUTPATIENT)
Dept: PULMONOLOGY | Facility: CLINIC | Age: 64
End: 2019-03-05
Payer: MEDICARE

## 2019-03-05 ENCOUNTER — DOCUMENTATION ONLY (OUTPATIENT)
Dept: RESEARCH | Facility: HOSPITAL | Age: 64
End: 2019-03-05

## 2019-03-05 ENCOUNTER — OFFICE VISIT (OUTPATIENT)
Dept: NEPHROLOGY | Facility: CLINIC | Age: 64
End: 2019-03-05
Payer: MEDICARE

## 2019-03-05 ENCOUNTER — HOSPITAL ENCOUNTER (OUTPATIENT)
Dept: RADIOLOGY | Facility: HOSPITAL | Age: 64
Discharge: HOME | End: 2019-03-05
Attending: INTERNAL MEDICINE
Payer: MEDICARE

## 2019-03-05 VITALS
SYSTOLIC BLOOD PRESSURE: 110 MMHG | BODY MASS INDEX: 28.83 KG/M2 | DIASTOLIC BLOOD PRESSURE: 80 MMHG | HEIGHT: 68 IN | WEIGHT: 190.25 LBS | RESPIRATION RATE: 20 BRPM | HEART RATE: 88 BPM

## 2019-03-05 VITALS
HEART RATE: 111 BPM | DIASTOLIC BLOOD PRESSURE: 72 MMHG | WEIGHT: 190.25 LBS | OXYGEN SATURATION: 98 % | SYSTOLIC BLOOD PRESSURE: 122 MMHG | HEIGHT: 68 IN | RESPIRATION RATE: 17 BRPM | BODY MASS INDEX: 28.83 KG/M2

## 2019-03-05 DIAGNOSIS — E87.6 HYPOKALEMIA: ICD-10-CM

## 2019-03-05 DIAGNOSIS — D64.9 ANEMIA, UNSPECIFIED TYPE: ICD-10-CM

## 2019-03-05 DIAGNOSIS — R80.9 PROTEINURIA DUE TO TYPE 2 DIABETES MELLITUS: ICD-10-CM

## 2019-03-05 DIAGNOSIS — R06.2 WHEEZING: ICD-10-CM

## 2019-03-05 DIAGNOSIS — I50.42 CHRONIC COMBINED SYSTOLIC AND DIASTOLIC HEART FAILURE: ICD-10-CM

## 2019-03-05 DIAGNOSIS — R05.9 COUGH: Primary | ICD-10-CM

## 2019-03-05 DIAGNOSIS — N18.30 CKD (CHRONIC KIDNEY DISEASE) STAGE 3, GFR 30-59 ML/MIN: Primary | ICD-10-CM

## 2019-03-05 DIAGNOSIS — Z00.6 RESEARCH EXAM: ICD-10-CM

## 2019-03-05 DIAGNOSIS — E11.29 PROTEINURIA DUE TO TYPE 2 DIABETES MELLITUS: ICD-10-CM

## 2019-03-05 DIAGNOSIS — Z87.09 HISTORY OF ASTHMA: ICD-10-CM

## 2019-03-05 DIAGNOSIS — G47.30 SLEEP APNEA, UNSPECIFIED TYPE: ICD-10-CM

## 2019-03-05 DIAGNOSIS — N18.1 CKD (CHRONIC KIDNEY DISEASE) STAGE 1, GFR 90 ML/MIN OR GREATER: Primary | ICD-10-CM

## 2019-03-05 DIAGNOSIS — G47.34 NOCTURNAL HYPOXEMIA: ICD-10-CM

## 2019-03-05 DIAGNOSIS — N18.30 CKD (CHRONIC KIDNEY DISEASE) STAGE 3, GFR 30-59 ML/MIN: ICD-10-CM

## 2019-03-05 DIAGNOSIS — R05.9 COUGH: ICD-10-CM

## 2019-03-05 PROCEDURE — 99999 PR PBB SHADOW E&M-EST. PATIENT-LVL III: CPT | Mod: PBBFAC,,, | Performed by: INTERNAL MEDICINE

## 2019-03-05 PROCEDURE — 71046 XR CHEST PA AND LATERAL: ICD-10-PCS | Mod: 26,,, | Performed by: RADIOLOGY

## 2019-03-05 PROCEDURE — 99999 PR PBB SHADOW E&M-EST. PATIENT-LVL III: ICD-10-PCS | Mod: PBBFAC,,, | Performed by: INTERNAL MEDICINE

## 2019-03-05 PROCEDURE — 99213 OFFICE O/P EST LOW 20 MIN: CPT | Mod: PBBFAC,25,27,PN | Performed by: INTERNAL MEDICINE

## 2019-03-05 PROCEDURE — 99214 PR OFFICE/OUTPT VISIT, EST, LEVL IV, 30-39 MIN: ICD-10-PCS | Mod: S$PBB,,, | Performed by: INTERNAL MEDICINE

## 2019-03-05 PROCEDURE — 71046 X-RAY EXAM CHEST 2 VIEWS: CPT | Mod: TC

## 2019-03-05 PROCEDURE — 71046 X-RAY EXAM CHEST 2 VIEWS: CPT | Mod: 26,,, | Performed by: RADIOLOGY

## 2019-03-05 PROCEDURE — 99213 OFFICE O/P EST LOW 20 MIN: CPT | Mod: PBBFAC,PN,25 | Performed by: INTERNAL MEDICINE

## 2019-03-05 PROCEDURE — 99214 OFFICE O/P EST MOD 30 MIN: CPT | Mod: S$PBB,,, | Performed by: INTERNAL MEDICINE

## 2019-03-05 RX ORDER — PREDNISONE 10 MG/1
TABLET ORAL
Qty: 21 TABLET | Refills: 0 | Status: SHIPPED | OUTPATIENT
Start: 2019-03-05 | End: 2019-03-28

## 2019-03-05 RX ORDER — ALBUTEROL SULFATE 90 UG/1
2 AEROSOL, METERED RESPIRATORY (INHALATION) EVERY 6 HOURS PRN
Qty: 1 INHALER | Refills: 6 | Status: SHIPPED | OUTPATIENT
Start: 2019-03-05 | End: 2019-09-03

## 2019-03-05 NOTE — PROGRESS NOTES
PROGRESS NOTE FOR ESTABLISHED PATIENT    PHYSICIAN REQUESTING THE CONSULT: Dr. Sandra Estevez    REASON FOR VISIT: Renal insufficiency    63 y.o. male with history of CKD 3/4, HTN, CHF, anemia, DM2, CAD, pulmonary hypertension presents to the renal clinic for evaluation of renal insufficiency.       Patient had recent hospital stay at [facility] in 11/2018 when he presented with leak from colostomy reversal and BROOKE. Peak creatinine was 8.2 on 11/15/18. Renal function has recovered with IV fluids and creatinine has now declined to 1.8 (2/26/19).    Patient today has  no complaints.         Past Medical History:   Diagnosis Date    Arthritis     CAD (coronary artery disease)     Cataract     CHF (congestive heart failure)     Dr Calvillo    Chronic combined systolic and diastolic congestive heart failure 3/24/2015    CKD (chronic kidney disease), stage III     Diabetes mellitus type II      AM    Diabetic retinopathy     anti Veg F injections for macular edema    Diverticulitis of colon     ED (erectile dysfunction)     Encounter for blood transfusion     Glaucoma     HTN (hypertension)     Hyperlipidemia     Ischemic cardiomyopathy     Obesity     JAHAIRA on CPAP     Pacemaker     Pulmonary HTN        Past Surgical History:   Procedure Laterality Date    CARDIAC CATHETERIZATION  2009    CHOLECYSTECTOMY      CLOSURE, ILEOSTOMY N/A 1/22/2019    [facility]    COLECTOMY-SIGMOID N/A 4/22/2016    [facility] OR    COLON SURGERY      Sigmoid resection    COLONOSCOPY N/A 10/11/2018    Performed by Quinn [facility] at [facility] ENDO    COLOSTOMY N/A 4/22/2016    [facility] OR    CREATION, ILEOSTOMY N/A 10/30/2018    [facility] OR    EYE SURGERY      HEMICOLECTOMY, RIGHT Right 10/30/2018    [facility] OR    Ileostomy reversal  01/2019    KNEE SURGERY    "   MOBILIZATION, SPLENIC FLEXURE N/A 10/30/2018    [facility]    REVISION OR CLOSURE, COLOSTOMY N/A 10/30/2018    [facility] OR    SIGMOIDOSCOPY, FLEXIBLE N/A 1/18/2019    Performed by [facility] at [facility] ENDO       Review of patient's allergies indicates:  No Known Allergies    Current Outpatient Medications   Medication Sig Dispense Refill    allopurinol (ZYLOPRIM) 100 MG tablet TAKE 1 TABLET (100 MG TOTAL) BY MOUTH ONCE DAILY. 90 tablet 0    aspirin (ECOTRIN) 81 MG EC tablet Take 81 mg by mouth every morning.       BD INSULIN SYRINGE ULTRA-FINE 0.5 mL 31 gauge x 5/16 Syrg USE AS DIRECTED TO INJECT INSULIN TWO TIMES DAILY 60 each 11    bimatoprost (LUMIGAN) 0.03 % ophthalmic drops Place 1 drop into the left eye every evening. 2.5 mL 11    blood sugar diagnostic Strp 1 strip by Misc.(Non-Drug; Combo Route) route 4 (four) times daily. Telcare 120 strip 11    bumetanide (BUMEX) 2 MG tablet MWF take 1 whole tab and on Tues, Thurs, Sat, Sun  Take 1/2 tab 75 tablet 11    cholecalciferol, vitamin D3, (VITAMIN D3) 1,000 unit capsule Take 1,000 Units by mouth once daily.      diphenoxylate-atropine 2.5-0.025 mg (LOMOTIL) 2.5-0.025 mg per tablet Take 1 tablet by mouth 3 (three) times daily.      insulin glargine (LANTUS) 100 unit/mL injection 50 units bid prn when BS< 150 30 mL 11    IRON/VITAMIN B COMPLEX (GERITOL ORAL) Take by mouth.      lancets Misc For tel care 120 each 11    lisinopril (PRINIVIL,ZESTRIL) 5 MG tablet TAKE 1 TABLET (5 MG TOTAL) BY MOUTH ONCE DAILY. (Patient taking differently: Take 5 mg by mouth every morning. ) 90 tablet 3    pantoprazole (PROTONIX) 40 MG tablet TAKE 1 TABLET BY MOUTH EVERY DAY FOR ACID REFLUX 90 tablet 1    pen needle, diabetic (BD ULTRA-FINE JOSLYN PEN NEEDLE) 32 gauge x 5/32" Ndle 1 each by Misc.(Non-Drug; Combo Route) route 4 (four) times daily. 100 each 11    simvastatin (ZOCOR) 10 MG tablet " TAKE 1 TABLET (10 MG TOTAL) BY MOUTH EVERY EVENING. 30 tablet 7    sodium bicarbonate 650 MG tablet Take 3 tablets (1,950 mg total) by mouth 3 (three) times daily. 270 tablet 11    albuterol (VENTOLIN HFA) 90 mcg/actuation inhaler Inhale 2 puffs into the lungs every 6 (six) hours as needed for Wheezing. Rescue 1 Inhaler 6    nozaseptin (NOZIN) nasal  1 each by Each Nare route 2 (two) times daily. 1 applicator 0    predniSONE (DELTASONE) 10 MG tablet Prednisone 40 mg daily for 3 days then 20 mg daily for 3 days then 10 mg daily for 3 days 21 tablet 0     No current facility-administered medications for this visit.        Family History   Problem Relation Age of Onset    Cancer Mother     Heart disease Father     Stroke Father     No Known Problems Sister     No Known Problems Brother     No Known Problems Maternal Aunt     No Known Problems Maternal Uncle     No Known Problems Paternal Aunt     No Known Problems Paternal Uncle     No Known Problems Maternal Grandmother     No Known Problems Maternal Grandfather     No Known Problems Paternal Grandmother     No Known Problems Paternal Grandfather     Amblyopia Neg Hx     Blindness Neg Hx     Cataracts Neg Hx     Diabetes Neg Hx     Glaucoma Neg Hx     Hypertension Neg Hx     Macular degeneration Neg Hx     Retinal detachment Neg Hx     Strabismus Neg Hx     Thyroid disease Neg Hx        Social History     Socioeconomic History    Marital status:      Spouse name: Not on file    Number of children: 2    Years of education: Not on file    Highest education level: Not on file   Social Needs    Financial resource strain: Not on file    Food insecurity - worry: Not on file    Food insecurity - inability: Not on file    Transportation needs - medical: Not on file    Transportation needs - non-medical: Not on file   Occupational History    Occupation: [employer]     Employer: [employer]   Tobacco Use     "Smoking status: Never Smoker    Smokeless tobacco: Never Used   Substance and Sexual Activity    Alcohol use: No     Alcohol/week: 0.0 oz     Frequency: Never    Drug use: No    Sexual activity: Not Currently   Other Topics Concern    Not on file   Social History Narrative    . Lives with spouse. Has 2 children. 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       Review of Systems:  1. GENERAL: patient denies any fever, weight changes, generalized weakness, dizziness.  2. HEENT: patient denies headaches, visual disturbances, swallowing problems, sinus pain, nasal congestion.  3. CARDIOVASCULAR: patient denies chest pain, palpitations.  4. PULMONARY: patient reports SOB with exercise, no coughing, hemoptysis, wheezing.  5. GASTROINTESTINAL: patient denies abdominal pain, nausea, vomiting, diarrhea.  6. GENITOURINARY: patient denies dysuria, hematuria, hesitancy, frequency.  7. EXTREMITIES: patient denies LE edema, no LE cramping.  8. DERMATOLOGY: patient denies rashes, ulcers.  9. NEURO: patient denies tremors, extremity weakness, extremity numbness/tingling.  10. MUSCULOSKELETAL: patient denies joint pain, joint swelling.  11. HEMATOLOGY: patient denies rectal or gum bleeding.  12: PSYCH: patient denies anxiety, depression.      PHYSICAL EXAM:  /80 (BP Location: Right arm, Patient Position: Sitting, BP Method: Medium (Manual))   Pulse 88   Resp 20   Ht 5' 8" (1.727 m)   Wt 86.3 kg (190 lb 4.1 oz)   BMI 28.93 kg/m²     GENERAL: Pleasant gentleman presents to clinic with non-labored breathing.  HEENT: PER, no nasal discharge, no icterus, no oral exudates, moist mucosal membranes.  NECK: no thyroid mass, no lymphadenopathy.  HEART: RRR S1/S2, no rubs, good peripheral pulses.  LUNGS: CTA bilaterally, no wheezing, breathing is nonlabored.  ABDOMEN: soft, nontender, not distended, bowel sounds are present, no abdominal hernia  EXTREM: No LE edema.  SKIN: no rashes, skin is " warm and dry.  NEURO: A & O x 3, no obvious focal signs.    LABORATORY RESULTS:    Lab Results   Component Value Date    CREATININE 1.8 (H) 02/26/2019    BUN 12 02/26/2019     02/26/2019    K 3.0 (L) 02/26/2019    CL 99 02/26/2019    CO2 30 (H) 02/26/2019      Lab Results   Component Value Date    .0 (H) 02/26/2019    CALCIUM 8.7 02/26/2019    PHOS 3.1 02/26/2019     Lab Results   Component Value Date    ALBUMIN 2.9 (L) 02/26/2019     Lab Results   Component Value Date    WBC 9.08 01/23/2019    HGB 9.4 (L) 01/23/2019    HCT 29.8 (L) 01/23/2019    MCV 84 01/23/2019     01/23/2019     Protein Creatinine Ratios:    Creatinine, Random Ur   Date Value Ref Range Status   02/26/2019 185.0 23.0 - 375.0 mg/dL Final     Comment:     The random urine reference ranges provided were established   for 24 hour urine collections.  No reference ranges exist for  random urine specimens.  Correlate clinically.     12/12/2018 187.6 23.0 - 375.0 mg/dL Final     Comment:     The random urine reference ranges provided were established   for 24 hour urine collections.  No reference ranges exist for  random urine specimens.  Correlate clinically.     11/15/2018 >450.0 (H) 23.0 - 375.0 mg/dL Final     Comment:     The random urine reference ranges provided were established   for 24 hour urine collections.  No reference ranges exist for  random urine specimens.  Correlate clinically.     11/15/2018 >450.0 (H) 23.0 - 375.0 mg/dL Final     Comment:     The random urine reference ranges provided were established   for 24 hour urine collections.  No reference ranges exist for  random urine specimens.  Correlate clinically.       Protein, Urine Random   Date Value Ref Range Status   02/26/2019 201 (H) 0 - 15 mg/dL Final     Comment:     The random urine reference ranges provided were established   for 24 hour urine collections.  No reference ranges exist for  random urine specimens.  Correlate clinically.     11/15/2018 38 (H)  0 - 15 mg/dL Final     Comment:     The random urine reference ranges provided were established   for 24 hour urine collections.  No reference ranges exist for  random urine specimens.  Correlate clinically.     08/10/2018 37 (H) 0 - 15 mg/dL Final     Comment:     The random urine reference ranges provided were established   for 24 hour urine collections.  No reference ranges exist for  random urine specimens.  Correlate clinically.       Prot/Creat Ratio, Ur   Date Value Ref Range Status   02/26/2019 1.09 (H) 0.00 - 0.20 Final   11/15/2018 Unable to calculate 0.00 - 0.20 Final   08/10/2018 0.36 (H) 0.00 - 0.20 Final             ASSESSMENT AND PLAN:  63 y.o. male with history of CKD 3/4, HTN, CHF, anemia, DM2, CAD, pulmonary hypertension, CHF presents to the renal clinic for evaluation of renal insufficiency.     1. Renal insufficiency: Patient had recent episode of BROOKE with peak creatinine of 8.2 on 11/15/18. Renal function has improved with IV fluids and creatinine has declined to 1.8. Patient likely suffers from mild cardiorenal syndrome (EF 20-25%) and creatinine has been fluctuating (in addition to diabetic nephropathy). Will continue Lisinopril for proteinuria.  Patient's renal function will be monitored closely and he will return to the clinic in 4 weeks for follow up. Patient is on fluid restrictions (about 40-50 ounces per day).     2. Electrolytes: mild hypokalemia. Stop sodium bicarbonate.     3. Acid base status: mild alkalosis, stop sodium bicarbonate.     4. Volume: Mild LE edema. Continue Bumex.     5. Hypertension: BP borderline low (consequences of poor cardiac function). He denies any hypotension-related symptoms.      6. Medications: Reviewed. Agree with current medical regimen. Stop sodium bicarbonate.     7. DM2: well controlled with last HgA1c at 6.5 (5/2/18).     8. CAD/CHF: As per Cardiology (Dr. Calvillo).     9. Hyperparathyroidism: vitamin D OTC.

## 2019-03-05 NOTE — PROGRESS NOTES
PROTOCOL: [id number]-D 200808  SUBJECT INITIALS: ONI  SUBJECT SCREENING NUMBER: [id number]      Coordinator met subject for consent/screening for the [facility] ASCEND-ND 200808 Study.      Consent was reviewed in a private space with the subject. He was asked if he had any questions following review of the consent. Patient confirmed that he had no questions. The purpose of the trial, as well as the length and number of participants was discussed with the patient. He verbalized understanding. Subject responsibilities and procedures, for screening/run-in/baseline/treatment period/follow-up were discussed in detail with the subject. He verbalized understanding. He was explained the risks, benefits, alternatives, and voluntary nature of the trial. Compensation was discussed with the subject. He verbalized understanding and willingness to participate. Confidentiality, HIPAA, and storage of samples for future use was discussed with the subject. He verbalized understanding of all information and declined sample storage. The genetic portion of the study was reviewed, and patient declined participation.      PI was available to answer any questions that he had. Patient had no questions at the time of consent. Subject was offered Main Study Consent (dated 02/08/2019). Subject was given ample time to review and ask questions. Subject had no further questions at this time and willingly signed the Main study consent. He did not sign Consent for Genetics Part of ASCEND-ND Clinical Research Study.He was provided with a signed copy of the consent. No study procedures were performed prior to consent.       Screening number was unable to be obtained through [facility] due to site screening limit hold. Monitor and CTM were contacted to resolve this matter.  ---  Labs were drawn per the study protocol at 11:40 by [facility]. HemoCue analysis was performed on site, value 9.9 g/dL. Subject eligible to continue with screening procedures.      Vitals  were collected and recorded:     BP: 110/80  HR: 88  Weight: 86.3kg  Height: 172.72 cm      All questionnaires were read to subject and answered by the subject. The site tablet was unable to be used for questionnaires due to lack of having a subject number to register subject.       Labs were processed at [facility] facility and shipped via [employer].      Clincard was dispensed for use. Subject was informed that he would be contacted as soon as all of his lab results were available and we would let him know at that time if he qualifies to continue to the Run-In phase of the trial. Subject was given our contact information should he have any questions

## 2019-03-05 NOTE — PROGRESS NOTES
Pulmonary Outpatient Follow Up Visit     Subjective:       Patient ID: Yan Choudhury is a 63 y.o. male.    Chief Complaint: Cough (productive- clear mucus )      HPI  63-year-old male patient presenting for evaluation of worsening coughing and wheezing and clear sputum production for the last month.    I have evaluated November 2018 him in the hospital he was admitted for diverticulitis, status post bowel resection, parastomal hernia and parastomal hernia repair, ileostomy closure January 22nd.    Has history of asthma, was dormant since the age of 18, never smoker, CKD stage 3, combined systolic and diastolic heart failure, obstructive and central sleep apnea not amenable to titration with either CPAP or BiPAP.  Has low EF 30%.  Not suitable for ASV treatment.            Review of Systems   Constitutional: Negative for fever and chills.   HENT: Negative for nosebleeds.    Eyes: Negative for redness.        Visual disturbance   Respiratory: Positive for apnea, snoring, cough, sputum production, shortness of breath, wheezing, orthopnea and dyspnea on extertion. Negative for choking.    Cardiovascular: Positive for leg swelling.        Pitting edema on lower extremity bilaterally   Genitourinary: Negative for hematuria.   Endocrine: Negative for cold intolerance.    Musculoskeletal: Positive for arthralgias, back pain and gait problem.   Skin: Negative for rash.   Gastrointestinal: Negative for vomiting.   Neurological: Negative for syncope.   Hematological: Negative for adenopathy.   Psychiatric/Behavioral: Positive for sleep disturbance. Negative for confusion.       Outpatient Encounter Medications as of 3/5/2019   Medication Sig Dispense Refill    allopurinol (ZYLOPRIM) 100 MG tablet TAKE 1 TABLET (100 MG TOTAL) BY MOUTH ONCE DAILY. 90 tablet 0    aspirin (ECOTRIN) 81 MG EC tablet Take 81 mg by mouth every morning.       BD INSULIN SYRINGE ULTRA-FINE 0.5 mL 31 gauge x  "5/16 Syrg USE AS DIRECTED TO INJECT INSULIN TWO TIMES DAILY 60 each 11    bimatoprost (LUMIGAN) 0.03 % ophthalmic drops Place 1 drop into the left eye every evening. 2.5 mL 11    blood sugar diagnostic Strp 1 strip by Misc.(Non-Drug; Combo Route) route 4 (four) times daily. Telcare 120 strip 11    bumetanide (BUMEX) 2 MG tablet MWF take 1 whole tab and on Tues, Thurs, Sat, Sun  Take 1/2 tab 75 tablet 11    cholecalciferol, vitamin D3, (VITAMIN D3) 1,000 unit capsule Take 1,000 Units by mouth once daily.      diphenoxylate-atropine 2.5-0.025 mg (LOMOTIL) 2.5-0.025 mg per tablet Take 1 tablet by mouth 3 (three) times daily.      insulin glargine (LANTUS) 100 unit/mL injection 50 units bid prn when BS< 150 30 mL 11    IRON/VITAMIN B COMPLEX (GERITOL ORAL) Take by mouth.      lancets [facility] For tel care 120 each 11    lisinopril (PRINIVIL,ZESTRIL) 5 MG tablet TAKE 1 TABLET (5 MG TOTAL) BY MOUTH ONCE DAILY. (Patient taking differently: Take 5 mg by mouth every morning. ) 90 tablet 3    pantoprazole (PROTONIX) 40 MG tablet TAKE 1 TABLET BY MOUTH EVERY DAY FOR ACID REFLUX 90 tablet 1    pen needle, diabetic (BD ULTRA-FINE JOSLYN PEN NEEDLE) 32 gauge x 5/32" Ndle 1 each by Misc.(Non-Drug; Combo Route) route 4 (four) times daily. 100 each 11    simvastatin (ZOCOR) 10 MG tablet TAKE 1 TABLET (10 MG TOTAL) BY MOUTH EVERY EVENING. 30 tablet 7    sodium bicarbonate 650 MG tablet Take 3 tablets (1,950 mg total) by mouth 3 (three) times daily. 270 tablet 11    albuterol (VENTOLIN HFA) 90 mcg/actuation inhaler Inhale 2 puffs into the lungs every 6 (six) hours as needed for Wheezing. Rescue 1 Inhaler 6    nozaseptin (NOZIN) nasal  1 each by Each Nare route 2 (two) times daily. 1 applicator 0    predniSONE (DELTASONE) 10 MG tablet Prednisone 40 mg daily for 3 days then 20 mg daily for 3 days then 10 mg daily for 3 days 21 tablet 0     No facility-administered encounter medications on file as of 3/5/2019.  " "      Objective:     Vital Signs (Most Recent)  Vital Signs  Pulse: (!) 111  Resp: 17  SpO2: 98 %  BP: 122/72  Height and Weight  Height: 5' 8" (172.7 cm)  Weight: 86.3 kg (190 lb 4.1 oz)  BSA (Calculated - sq m): 2.03 sq meters  BMI (Calculated): 29  Weight in (lb) to have BMI = 25: 164.1]  Wt Readings from Last 2 Encounters:   03/05/19 86.3 kg (190 lb 4.1 oz)   02/11/19 84.3 kg (185 lb 13.6 oz)       Physical Exam   Constitutional: He is oriented to person, place, and time. He appears well-developed.   HENT:   Head: Normocephalic.   Neck: Neck supple.   Cardiovascular: Normal rate.   Pulmonary/Chest: Normal expansion and effort normal. No stridor. No respiratory distress. He has decreased breath sounds. He has wheezes. He exhibits no tenderness.   Left-sided defibrillator   bilateral scattered wheezing   Abdominal: Soft.   Musculoskeletal: He exhibits edema. He exhibits no tenderness.   Bilateral lower extremity pitting edema.   Lymphadenopathy:     He has no cervical adenopathy.   Neurological: He is alert and oriented to person, place, and time. Gait abnormal.   Skin: Skin is warm. No rash noted.   Psychiatric: He has a normal mood and affect. His behavior is normal. Judgment and thought content normal.   Nursing note and vitals reviewed.      Laboratory  Lab Results   Component Value Date    WBC 9.08 01/23/2019    RBC 3.57 (L) 01/23/2019    HGB 9.4 (L) 01/23/2019    HCT 29.8 (L) 01/23/2019    MCV 84 01/23/2019    MCH 26.3 (L) 01/23/2019    MCHC 31.5 (L) 01/23/2019    RDW 17.4 (H) 01/23/2019     01/23/2019    MPV SEE COMMENT 01/23/2019    GRAN 7.0 01/23/2019    GRAN 76.9 (H) 01/23/2019    LYMPH 1.3 01/23/2019    LYMPH 14.0 (L) 01/23/2019    MONO 0.7 01/23/2019    MONO 7.7 01/23/2019    EOS 0.1 01/23/2019    BASO 0.02 01/23/2019    EOSINOPHIL 1.4 01/23/2019    BASOPHIL 0.2 01/23/2019       BMP  Lab Results   Component Value Date     02/26/2019    K 3.0 (L) 02/26/2019    CL 99 02/26/2019    CO2 30 (H) " 02/26/2019    BUN 12 02/26/2019    CREATININE 1.8 (H) 02/26/2019    CALCIUM 8.7 02/26/2019    ANIONGAP 12 02/26/2019    ESTGFRAFRICA 44.7 (A) 02/26/2019    EGFRNONAA 38.6 (A) 02/26/2019    AST 33 01/14/2019    ALT 36 01/14/2019    PROT 7.8 01/14/2019       Lab Results   Component Value Date     (H) 01/16/2019     (H) 11/15/2018     (H) 08/10/2018     (H) 05/02/2018    BNP 1,020 (H) 01/20/2018    BNP 1,683 (H) 01/11/2018       Lab Results   Component Value Date    TSH 1.506 11/16/2018       Lab Results   Component Value Date    SEDRATE 112 (H) 11/15/2018       Lab Results   Component Value Date    CRP 67.3 (H) 11/15/2018         Diagnostic Results:  I have personally reviewed today the following studies:    PSG is and CPAP and BiPAP titration were reviewed.  Patient has severe sleep apnea, not amenable to treatment with BiPAP or CPAP.    Chest x-ray January 2019 clear lungs.  Right-sided MediPort left-sided ICD.  No pneumothorax after PICC line placement visible in the right  SVC.      2D echo October 2018    1 - Biatrial enlargement.     2 - Mild left ventricular enlargement.     3 - Eccentric hypertrophy.     4 - Wall motion abnormalities.     5 - Moderately depressed left ventricular systolic function (EF 30-35%).     6 - Restrictive LV filling pattern, indicating markedly elevated LAP (grade 3 diastolic dysfunction).     7 - Right ventricular enlargement with low normal systolic function.     8 - The estimated PA systolic pressure is 35 mmHg.     9 - Mild tricuspid regurgitation.     10 - Moderate pulmonic regurgitation.     11 - Mild to moderate aortic regurgitation.   Assessment/Plan:   Cough  -     X-Ray Chest PA And Lateral; Future; Expected date: 03/05/2019  -     Complete PFT with bronchodilator; Future; Expected date: 03/19/2019  -     predniSONE (DELTASONE) 10 MG tablet; Prednisone 40 mg daily for 3 days then 20 mg daily for 3 days then 10 mg daily for 3 days  Dispense: 21  tablet; Refill: 0  -     albuterol (VENTOLIN HFA) 90 mcg/actuation inhaler; Inhale 2 puffs into the lungs every 6 (six) hours as needed for Wheezing. Rescue  Dispense: 1 Inhaler; Refill: 6    Wheezing  -     X-Ray Chest PA And Lateral; Future; Expected date: 03/05/2019  -     Complete PFT with bronchodilator; Future; Expected date: 03/19/2019  -     predniSONE (DELTASONE) 10 MG tablet; Prednisone 40 mg daily for 3 days then 20 mg daily for 3 days then 10 mg daily for 3 days  Dispense: 21 tablet; Refill: 0  -     albuterol (VENTOLIN HFA) 90 mcg/actuation inhaler; Inhale 2 puffs into the lungs every 6 (six) hours as needed for Wheezing. Rescue  Dispense: 1 Inhaler; Refill: 6    History of asthma  -     Complete PFT with bronchodilator; Future; Expected date: 03/19/2019    Sleep apnea, unspecified type    Chronic combined systolic and diastolic heart failure  -     Brain natriuretic peptide; Future; Expected date: 03/05/2019    CKD (chronic kidney disease) stage 3, GFR 30-59 ml/min    Nocturnal hypoxemia     Start albuterol p.r.n.    Prednisone taper.    PFT with bronchodilator.    Check BNP.  Check chest x-ray today    Continue O2 during sleep.    Continue Ace inhibitors and Bumex.          Up-to-date on influenza vaccination pneumococcal vaccines.    Follow-up in about 1 month (around 4/5/2019).    This note was prepared using voice recognition system and is likely to have sound alike errors that may have been overlooked even after proof reading.  Please call me with any questions    Discussed diagnosis, its evaluation, treatment and usual course. All questions answered.      Lizette Plasencia MD

## 2019-03-07 ENCOUNTER — TELEPHONE (OUTPATIENT)
Dept: PULMONOLOGY | Facility: CLINIC | Age: 64
End: 2019-03-07

## 2019-03-07 NOTE — TELEPHONE ENCOUNTER
Called patient and left message with wife to please call me back ----- Message from Lizette Plasencia MD sent at 3/5/2019  8:18 PM CST -----  Please inform patient showed fluid in the lung s, he has to continue his fluid pill, bumex, follow up with nephrology and cardiology.   Thank you

## 2019-03-12 RX ORDER — BIMATOPROST 0.3 MG/ML
1 SOLUTION/ DROPS OPHTHALMIC NIGHTLY
Qty: 2.5 ML | Refills: 6 | Status: SHIPPED | OUTPATIENT
Start: 2019-03-12 | End: 2019-03-28 | Stop reason: SDUPTHER

## 2019-03-13 ENCOUNTER — TELEPHONE (OUTPATIENT)
Dept: CARDIOLOGY | Facility: CLINIC | Age: 64
End: 2019-03-13

## 2019-03-13 ENCOUNTER — TELEPHONE (OUTPATIENT)
Dept: RESEARCH | Facility: HOSPITAL | Age: 64
End: 2019-03-13

## 2019-03-13 NOTE — TELEPHONE ENCOUNTER
Saturday Mr. Choudhury ankles were swollen. Wife states he has been taking his Bumex Monday ,Wedneday  And Friday 2mg, and Tuesday, Thursday, sat and sun 1/2 tablet. Wife wants to know if it can be increase. Please advise.

## 2019-03-13 NOTE — TELEPHONE ENCOUNTER
Patient wife notified of the medication changes and understands the medication directions. Voiced no question or concerns.

## 2019-03-13 NOTE — TELEPHONE ENCOUNTER
----- Message from Yashira Almeida sent at 3/13/2019  9:48 AM CDT -----  Contact: Mrs. Choudhury-Spouse  Mrs. Choudhury would like to know if Mr. Choudhury can increase Bumex dosage. Please call back at [phone].      Thanks,  Yashira Almeida

## 2019-03-14 ENCOUNTER — OFFICE VISIT (OUTPATIENT)
Dept: SURGERY | Facility: CLINIC | Age: 64
End: 2019-03-14
Payer: MEDICARE

## 2019-03-14 VITALS
SYSTOLIC BLOOD PRESSURE: 115 MMHG | DIASTOLIC BLOOD PRESSURE: 70 MMHG | BODY MASS INDEX: 30.47 KG/M2 | WEIGHT: 200.38 LBS | TEMPERATURE: 98 F | HEART RATE: 76 BPM

## 2019-03-14 DIAGNOSIS — Z09 POSTOP CHECK: Primary | ICD-10-CM

## 2019-03-14 PROBLEM — E87.20 ACIDOSIS, METABOLIC: Status: RESOLVED | Noted: 2019-01-18 | Resolved: 2019-03-14

## 2019-03-14 PROBLEM — Z01.810 PREOP CARDIOVASCULAR EXAM: Status: RESOLVED | Noted: 2018-09-24 | Resolved: 2019-03-14

## 2019-03-14 PROBLEM — N18.9 ACUTE ON CHRONIC RENAL FAILURE: Status: RESOLVED | Noted: 2018-11-15 | Resolved: 2019-03-14

## 2019-03-14 PROBLEM — N17.9 AKI (ACUTE KIDNEY INJURY): Status: RESOLVED | Noted: 2018-10-31 | Resolved: 2019-03-14

## 2019-03-14 PROBLEM — N17.9 ACUTE ON CHRONIC RENAL FAILURE: Status: RESOLVED | Noted: 2018-11-15 | Resolved: 2019-03-14

## 2019-03-14 PROCEDURE — 99024 POSTOP FOLLOW-UP VISIT: CPT | Mod: POP,,, | Performed by: SURGERY

## 2019-03-14 PROCEDURE — 99999 PR PBB SHADOW E&M-EST. PATIENT-LVL III: ICD-10-PCS | Mod: PBBFAC,,, | Performed by: SURGERY

## 2019-03-14 PROCEDURE — 99024 PR POST-OP FOLLOW-UP VISIT: ICD-10-PCS | Mod: POP,,, | Performed by: SURGERY

## 2019-03-14 PROCEDURE — 99213 OFFICE O/P EST LOW 20 MIN: CPT | Mod: PBBFAC,PN | Performed by: SURGERY

## 2019-03-14 PROCEDURE — 99999 PR PBB SHADOW E&M-EST. PATIENT-LVL III: CPT | Mod: PBBFAC,,, | Performed by: SURGERY

## 2019-03-14 NOTE — PROGRESS NOTES
Subjective:       Patient ID: Yan Choudhury is a 63 y.o. male.    Ileostomy reversal    Chief Complaint: Follow-up    Mr. Choudhury underwent a colostomy reversal.  He had a small air leak at the time.  He underwent a protective ileostomy.  Evaluation showed that the colon and healed.  He had his ileostomy reversed.    Patient now states he is doing well.  He has no abdominal pain.  He is tolerating a diet and having normal bowel movements      Review of Systems   Respiratory: Positive for shortness of breath.    Gastrointestinal: Negative.        Objective:      Physical Exam   Constitutional: No distress.   Slightly frail   Cardiovascular: Normal rate, regular rhythm and normal heart sounds.   Pulmonary/Chest: Effort normal and breath sounds normal.   Abdominal: Soft. Bowel sounds are normal. No hernia.   All incisions have healed.  No evidence of a hernia   Vitals reviewed.      Assessment:      post ileostomy reversal.  All incisions have healed including the drain site     Plan:       Activity as tolerated.  Follow up with surgery as needed    Before any major surgical procedures evaluation of the patient's pulmonary and cardiac status needs to be undertaken as he has required ICU stays for his last few intervention

## 2019-03-20 ENCOUNTER — CLINICAL SUPPORT (OUTPATIENT)
Dept: CARDIOLOGY | Facility: CLINIC | Age: 64
End: 2019-03-20
Attending: INTERNAL MEDICINE
Payer: MEDICARE

## 2019-03-20 DIAGNOSIS — Z95.810 ICD (IMPLANTABLE CARDIOVERTER-DEFIBRILLATOR) IN PLACE: ICD-10-CM

## 2019-03-20 DIAGNOSIS — I50.42 CHRONIC COMBINED SYSTOLIC AND DIASTOLIC HEART FAILURE: ICD-10-CM

## 2019-03-20 DIAGNOSIS — I25.5 ISCHEMIC CARDIOMYOPATHY: ICD-10-CM

## 2019-03-20 PROCEDURE — 93295 ICD REMOTE: ICD-10-PCS | Mod: ,,, | Performed by: INTERNAL MEDICINE

## 2019-03-20 PROCEDURE — 93296 REM INTERROG EVL PM/IDS: CPT | Mod: PBBFAC,PN | Performed by: INTERNAL MEDICINE

## 2019-03-20 PROCEDURE — 93297 ICD REMOTE: ICD-10-PCS | Mod: ,,, | Performed by: INTERNAL MEDICINE

## 2019-03-20 PROCEDURE — 93295 DEV INTERROG REMOTE 1/2/MLT: CPT | Mod: ,,, | Performed by: INTERNAL MEDICINE

## 2019-03-20 PROCEDURE — 93297 REM INTERROG DEV EVAL ICPMS: CPT | Mod: ,,, | Performed by: INTERNAL MEDICINE

## 2019-03-21 ENCOUNTER — LAB VISIT (OUTPATIENT)
Dept: LAB | Facility: HOSPITAL | Age: 64
End: 2019-03-21
Attending: INTERNAL MEDICINE
Payer: MEDICARE

## 2019-03-21 DIAGNOSIS — N18.30 CKD (CHRONIC KIDNEY DISEASE) STAGE 3, GFR 30-59 ML/MIN: ICD-10-CM

## 2019-03-21 DIAGNOSIS — E87.6 HYPOKALEMIA: ICD-10-CM

## 2019-03-21 DIAGNOSIS — D64.9 ANEMIA, UNSPECIFIED TYPE: ICD-10-CM

## 2019-03-21 LAB
ALBUMIN SERPL BCP-MCNC: 3 G/DL
ANION GAP SERPL CALC-SCNC: 12 MMOL/L
BASOPHILS # BLD AUTO: 0.04 K/UL
BASOPHILS NFR BLD: 0.5 %
BUN SERPL-MCNC: 23 MG/DL
CALCIUM SERPL-MCNC: 9.2 MG/DL
CHLORIDE SERPL-SCNC: 106 MMOL/L
CO2 SERPL-SCNC: 25 MMOL/L
CREAT SERPL-MCNC: 1.6 MG/DL
DIFFERENTIAL METHOD: ABNORMAL
EOSINOPHIL # BLD AUTO: 0.1 K/UL
EOSINOPHIL NFR BLD: 1.9 %
ERYTHROCYTE [DISTWIDTH] IN BLOOD BY AUTOMATED COUNT: 16.9 %
EST. GFR  (AFRICAN AMERICAN): 51.5 ML/MIN/1.73 M^2
EST. GFR  (NON AFRICAN AMERICAN): 44.5 ML/MIN/1.73 M^2
GLUCOSE SERPL-MCNC: 76 MG/DL
HCT VFR BLD AUTO: 34.4 %
HGB BLD-MCNC: 10.7 G/DL
IMM GRANULOCYTES # BLD AUTO: 0.03 K/UL
IMM GRANULOCYTES NFR BLD AUTO: 0.4 %
LYMPHOCYTES # BLD AUTO: 2.3 K/UL
LYMPHOCYTES NFR BLD: 30.8 %
MCH RBC QN AUTO: 28.6 PG
MCHC RBC AUTO-ENTMCNC: 31.1 G/DL
MCV RBC AUTO: 92 FL
MONOCYTES # BLD AUTO: 0.8 K/UL
MONOCYTES NFR BLD: 10.7 %
NEUTROPHILS # BLD AUTO: 4.1 K/UL
NEUTROPHILS NFR BLD: 55.7 %
NRBC BLD-RTO: 0 /100 WBC
PHOSPHATE SERPL-MCNC: 2.6 MG/DL
PLATELET # BLD AUTO: 159 K/UL
PMV BLD AUTO: 13.4 FL
POTASSIUM SERPL-SCNC: 4.2 MMOL/L
RBC # BLD AUTO: 3.74 M/UL
SODIUM SERPL-SCNC: 143 MMOL/L
WBC # BLD AUTO: 7.41 K/UL

## 2019-03-21 PROCEDURE — 80069 RENAL FUNCTION PANEL: CPT

## 2019-03-21 PROCEDURE — 85025 COMPLETE CBC W/AUTO DIFF WBC: CPT

## 2019-03-21 PROCEDURE — 36415 COLL VENOUS BLD VENIPUNCTURE: CPT

## 2019-03-25 ENCOUNTER — TELEPHONE (OUTPATIENT)
Dept: CARDIOLOGY | Facility: CLINIC | Age: 64
End: 2019-03-25

## 2019-03-25 NOTE — TELEPHONE ENCOUNTER
Returned pt call states he is experiencing bilateral lower extremity edema and sob. Pt denies any other symptoms.    I scheduled pt to see Jovana on tomorrow 3/26/19 [facility].

## 2019-03-25 NOTE — TELEPHONE ENCOUNTER
----- Message from Lindsey Forrester sent at 3/25/2019  9:09 AM CDT -----  Contact: spouse  He's calling in regards to legs being swollen     pls call back [phone] (home) or [phone]

## 2019-03-26 ENCOUNTER — TELEPHONE (OUTPATIENT)
Dept: CARDIOLOGY | Facility: CLINIC | Age: 64
End: 2019-03-26

## 2019-03-26 ENCOUNTER — HOSPITAL ENCOUNTER (OUTPATIENT)
Dept: RADIOLOGY | Facility: HOSPITAL | Age: 64
Discharge: HOME | End: 2019-03-26
Attending: PHYSICIAN ASSISTANT
Payer: MEDICARE

## 2019-03-26 ENCOUNTER — OFFICE VISIT (OUTPATIENT)
Dept: CARDIOLOGY | Facility: CLINIC | Age: 64
End: 2019-03-26
Payer: MEDICARE

## 2019-03-26 VITALS
HEART RATE: 80 BPM | SYSTOLIC BLOOD PRESSURE: 108 MMHG | BODY MASS INDEX: 29.67 KG/M2 | WEIGHT: 195.75 LBS | HEIGHT: 68 IN | DIASTOLIC BLOOD PRESSURE: 74 MMHG

## 2019-03-26 DIAGNOSIS — I25.5 ISCHEMIC CARDIOMYOPATHY: ICD-10-CM

## 2019-03-26 DIAGNOSIS — I25.10 CAD, MULTIPLE VESSEL: Chronic | ICD-10-CM

## 2019-03-26 DIAGNOSIS — I27.20 PULMONARY HTN: ICD-10-CM

## 2019-03-26 DIAGNOSIS — I50.42 CHRONIC COMBINED SYSTOLIC AND DIASTOLIC CONGESTIVE HEART FAILURE: Primary | ICD-10-CM

## 2019-03-26 DIAGNOSIS — R06.02 SHORTNESS OF BREATH: ICD-10-CM

## 2019-03-26 DIAGNOSIS — I44.7 LBBB (LEFT BUNDLE BRANCH BLOCK): ICD-10-CM

## 2019-03-26 DIAGNOSIS — N18.30 CKD (CHRONIC KIDNEY DISEASE) STAGE 3, GFR 30-59 ML/MIN: ICD-10-CM

## 2019-03-26 DIAGNOSIS — I50.42 CHRONIC COMBINED SYSTOLIC AND DIASTOLIC CONGESTIVE HEART FAILURE: ICD-10-CM

## 2019-03-26 DIAGNOSIS — Z95.810 BIVENTRICULAR ICD (IMPLANTABLE CARDIOVERTER-DEFIBRILLATOR) IN PLACE: Chronic | ICD-10-CM

## 2019-03-26 DIAGNOSIS — E78.5 HYPERLIPIDEMIA ASSOCIATED WITH TYPE 2 DIABETES MELLITUS: ICD-10-CM

## 2019-03-26 DIAGNOSIS — E11.69 HYPERLIPIDEMIA ASSOCIATED WITH TYPE 2 DIABETES MELLITUS: ICD-10-CM

## 2019-03-26 PROCEDURE — 99999 PR PBB SHADOW E&M-EST. PATIENT-LVL IV: ICD-10-PCS | Mod: PBBFAC,,, | Performed by: PHYSICIAN ASSISTANT

## 2019-03-26 PROCEDURE — 99214 PR OFFICE/OUTPT VISIT, EST, LEVL IV, 30-39 MIN: ICD-10-PCS | Mod: S$PBB,,, | Performed by: PHYSICIAN ASSISTANT

## 2019-03-26 PROCEDURE — 71046 X-RAY EXAM CHEST 2 VIEWS: CPT | Mod: 26,,, | Performed by: RADIOLOGY

## 2019-03-26 PROCEDURE — 99999 PR PBB SHADOW E&M-EST. PATIENT-LVL IV: CPT | Mod: PBBFAC,,, | Performed by: PHYSICIAN ASSISTANT

## 2019-03-26 PROCEDURE — 99214 OFFICE O/P EST MOD 30 MIN: CPT | Mod: S$PBB,,, | Performed by: PHYSICIAN ASSISTANT

## 2019-03-26 PROCEDURE — 71046 X-RAY EXAM CHEST 2 VIEWS: CPT | Mod: TC

## 2019-03-26 PROCEDURE — 99214 OFFICE O/P EST MOD 30 MIN: CPT | Mod: PBBFAC,25,PN | Performed by: PHYSICIAN ASSISTANT

## 2019-03-26 PROCEDURE — 71046 XR CHEST PA AND LATERAL: ICD-10-PCS | Mod: 26,,, | Performed by: RADIOLOGY

## 2019-03-26 NOTE — TELEPHONE ENCOUNTER
Morena, please call patient to confirm what dose of Zaroxolyn they have at home. He is significantly fluid overloaded and refused ED.

## 2019-03-26 NOTE — PROGRESS NOTES
Subjective:    Patient ID:  Yan Choudhury is a 63 y.o. male who presents for follow-up of Leg Swelling and Shortness of Breath      HPI  Mr. Choudhury presents to clinic for follow up and management of CHF. His current medical conditions include CAD/ICM, DCM, LBBB, HTN, CRI, DM s/p CRT-ICD, PHTN, dyslipidemia, obesity, and s/p colostomy reversal/parastomal repair and ileostomy placement and reversal. He returns today and states he is doing ok. Complains of worsening LE edema and SOB over the past week. Associated symptoms include weight gain (15 lbs since last visit) and orthopnea. He denies any associated chest pain, fever, chills, cough, PND, near syncope, or syncope. BP ok today in office. Patient reports compliance with his medications. He admits he has been non-compliant with salt and fluid intake. He tried increasing po Bumex with some mild improvement in symptoms last week.     Review of Systems   Constitution: Positive for malaise/fatigue and weight gain. Negative for chills, decreased appetite and fever.   HENT: Negative for congestion, hoarse voice and sore throat.    Eyes: Negative for blurred vision and discharge.   Cardiovascular: Positive for dyspnea on exertion and leg swelling. Negative for chest pain, claudication, cyanosis, irregular heartbeat, near-syncope, orthopnea, palpitations and paroxysmal nocturnal dyspnea.   Respiratory: Positive for shortness of breath. Negative for cough, hemoptysis, snoring, sputum production and wheezing.    Endocrine: Negative for cold intolerance and heat intolerance.   Hematologic/Lymphatic: Negative for bleeding problem. Bruises/bleeds easily.   Skin: Negative for rash.   Musculoskeletal: Negative for arthritis, back pain, joint pain, joint swelling, muscle cramps, muscle weakness and myalgias.   Gastrointestinal: Negative for abdominal pain, constipation, diarrhea, heartburn, melena and nausea.   Genitourinary: Negative for hematuria.   Neurological: Negative for  "dizziness, focal weakness, headaches, light-headedness, loss of balance, numbness, paresthesias, seizures and weakness.   Psychiatric/Behavioral: Negative for memory loss. The patient does not have insomnia.    Allergic/Immunologic: Negative for hives.     /74 (BP Location: Right arm, Patient Position: Sitting, BP Method: Large (Manual))   Pulse 80   Ht 5' 8" (1.727 m)   Wt 88.8 kg (195 lb 12.3 oz)   BMI 29.77 kg/m²     Objective:    Physical Exam   Constitutional: He is oriented to person, place, and time. He appears well-developed and well-nourished. No distress.   +conversational dyspnea   HENT:   Head: Normocephalic and atraumatic.   Eyes: Pupils are equal, round, and reactive to light. Right eye exhibits no discharge. Left eye exhibits no discharge.   Neck: Neck supple. JVD present.   Cardiovascular: Normal rate, regular rhythm, S1 normal, S2 normal and normal heart sounds.   No murmur heard.  Pulmonary/Chest: Effort normal. No respiratory distress. He has no wheezes. He has no rales.   Diminished BS at bases  CRT-D well-healed   Abdominal: Soft. He exhibits no distension.   Numerous surgical scars well healed   Musculoskeletal: He exhibits edema (2-3+ BLE pitting).   Neurological: He is alert and oriented to person, place, and time.   Skin: Skin is warm and dry. He is not diaphoretic. No erythema.   Psychiatric: He has a normal mood and affect. His behavior is normal.   Nursing note and vitals reviewed.    2D Echo CONCLUSIONS     1 - Biatrial enlargement.     2 - Mild left ventricular enlargement.     3 - Eccentric hypertrophy.     4 - Wall motion abnormalities.     5 - Moderately depressed left ventricular systolic function (EF 30-35%).     6 - Restrictive LV filling pattern, indicating markedly elevated LAP (grade 3 diastolic dysfunction).     7 - Right ventricular enlargement with low normal systolic function.     8 - The estimated PA systolic pressure is 35 mmHg.     9 - Mild tricuspid " regurgitation.     10 - Moderate pulmonic regurgitation.     11 - Mild to moderate aortic regurgitation.   Assessment:       1. Chronic combined systolic and diastolic congestive heart failure    2. Pulmonary HTN    3. Biventricular ICD (implantable cardioverter-defibrillator) in place    4. CAD, multiple vessel    5. Hyperlipidemia associated with type 2 diabetes mellitus    6. LBBB (left bundle branch block)    7. Ischemic cardiomyopathy    8. CKD (chronic kidney disease) stage 3, GFR 30-59 ml/min    9. Shortness of breath      Presents for f/u. Clinically volume overloaded. Recommended ED evaluation/diuresis/admission. Patient refused. Will check BMP, BNP today. Advised patient to take prn metolazone today followed by additional 1/2 pill of Bumex (1 mg). Further rec's to follow pending labs and CXR. Counseled extensively on importance of low salt diet and fluid restriction. Emphasized he must go to ED if no improvement.  Plan:   -BMP, BNP, CXR today with further rec's to follow  -Take prn metolazone, 30 minutes later take additional 1/2 pill of Bumex (1 mg)  -Continue other cardiac meds  -Low salt diet  -Refused ED  -Follow-up next week for symptom re-check/repeat labs    Chart reviewed. Dr. Calvillo agrees with plan as outlined above.

## 2019-03-26 NOTE — TELEPHONE ENCOUNTER
I spoke with Mrs Choudhury. Pt has metolazone 2.5mg at home. She says he took 1 dose today as instructed. They are awaiting further instructions.

## 2019-03-27 NOTE — TELEPHONE ENCOUNTER
I spoke with pts wife. She says he did take another metolazone again this morning. He was not at home right now for me to speak with. She says he did take a whole bumex this AM though. I explained to her the recommendations moving forward and she states understanding. She will have him call me back when he gets home. If pt is not having any improvement from yesterday she will notify him to report to ER.

## 2019-03-28 ENCOUNTER — OFFICE VISIT (OUTPATIENT)
Dept: NEPHROLOGY | Facility: CLINIC | Age: 64
End: 2019-03-28
Payer: MEDICARE

## 2019-03-28 ENCOUNTER — TELEPHONE (OUTPATIENT)
Dept: CARDIOLOGY | Facility: CLINIC | Age: 64
End: 2019-03-28

## 2019-03-28 VITALS
WEIGHT: 191.13 LBS | BODY MASS INDEX: 28.97 KG/M2 | HEIGHT: 68 IN | DIASTOLIC BLOOD PRESSURE: 88 MMHG | SYSTOLIC BLOOD PRESSURE: 138 MMHG | HEART RATE: 100 BPM

## 2019-03-28 DIAGNOSIS — R80.9 PROTEINURIA DUE TO TYPE 2 DIABETES MELLITUS: ICD-10-CM

## 2019-03-28 DIAGNOSIS — N25.81 HYPERPARATHYROIDISM, SECONDARY RENAL: ICD-10-CM

## 2019-03-28 DIAGNOSIS — E11.29 PROTEINURIA DUE TO TYPE 2 DIABETES MELLITUS: ICD-10-CM

## 2019-03-28 DIAGNOSIS — N18.30 CKD (CHRONIC KIDNEY DISEASE) STAGE 3, GFR 30-59 ML/MIN: Primary | ICD-10-CM

## 2019-03-28 PROCEDURE — 99999 PR PBB SHADOW E&M-EST. PATIENT-LVL III: ICD-10-PCS | Mod: PBBFAC,,, | Performed by: INTERNAL MEDICINE

## 2019-03-28 PROCEDURE — 99214 PR OFFICE/OUTPT VISIT, EST, LEVL IV, 30-39 MIN: ICD-10-PCS | Mod: S$PBB,,, | Performed by: INTERNAL MEDICINE

## 2019-03-28 PROCEDURE — 99999 PR PBB SHADOW E&M-EST. PATIENT-LVL III: CPT | Mod: PBBFAC,,, | Performed by: INTERNAL MEDICINE

## 2019-03-28 PROCEDURE — 2022F PR DILATED RETINAL EYE EXAM WITH INTERP/REVIEW: ICD-10-PCS | Mod: ,,, | Performed by: INTERNAL MEDICINE

## 2019-03-28 PROCEDURE — 2022F DILAT RTA XM EVC RTNOPTHY: CPT | Mod: ,,, | Performed by: INTERNAL MEDICINE

## 2019-03-28 PROCEDURE — 99214 OFFICE O/P EST MOD 30 MIN: CPT | Mod: S$PBB,,, | Performed by: INTERNAL MEDICINE

## 2019-03-28 PROCEDURE — 99213 OFFICE O/P EST LOW 20 MIN: CPT | Mod: PBBFAC,PN | Performed by: INTERNAL MEDICINE

## 2019-03-28 NOTE — TELEPHONE ENCOUNTER
Returned pt's wife call states she was calling to clarify medication change. I informed pt of telephone note on 3/27/19 per Jovana stated If he is feeling better, he can take an additional metolazone today 30 minutes prior to Bumex dosing. Stay on 1 mg (1/2) tab BID x next 2 days.      Pt wife voiced understanding phone review scheduled for tomorrow.

## 2019-03-28 NOTE — TELEPHONE ENCOUNTER
I spoke with patient's wife. He is feeling much better. SOB and LE edema greatly improving. He is aware no more metolazone after today.    He has been eating K rich foods while on metolazone. Phone review tmw.

## 2019-03-28 NOTE — PROGRESS NOTES
PROGRESS NOTE FOR ESTABLISHED PATIENT    PHYSICIAN REQUESTING THE CONSULT: Dr. Sandra Estevez    REASON FOR VISIT: Renal insufficiency    63 y.o. male with history of CKD 3/4, HTN, CHF, anemia, DM2, CAD, pulmonary hypertension presents to the renal clinic for evaluation of renal insufficiency.       Patient had recent hospital stay at [facility] in 11/2018 when he presented with leak from colostomy reversal and BROOKE. Peak creatinine was 8.2 on 11/15/18. Renal function has recovered with IV fluids and creatinine has now declined to 2 (3/2619).    Patient today reports LE edema.         Past Medical History:   Diagnosis Date    Arthritis     CAD (coronary artery disease)     Cataract     CHF (congestive heart failure)     Dr Calvillo    Chronic combined systolic and diastolic congestive heart failure 3/24/2015    CKD (chronic kidney disease), stage III     Diabetes mellitus type II      AM    Diabetic retinopathy     anti Veg F injections for macular edema    Diverticulitis of colon     ED (erectile dysfunction)     Encounter for blood transfusion     Glaucoma     HTN (hypertension)     Hyperlipidemia     Ischemic cardiomyopathy     Obesity     JAHAIRA on CPAP     Pacemaker     Pulmonary HTN        Past Surgical History:   Procedure Laterality Date    CARDIAC CATHETERIZATION  2009    CHOLECYSTECTOMY      CLOSURE, ILEOSTOMY N/A 1/22/2019    [facility]    COLECTOMY-SIGMOID N/A 4/22/2016    [facility]    COLON SURGERY      Sigmoid resection    COLONOSCOPY N/A 10/11/2018    [facility] Quinn [facility] ENDO    COLOSTOMY N/A 4/22/2016    [facility]    CREATION, ILEOSTOMY N/A 10/30/2018    [facility]    EYE SURGERY      HEMICOLECTOMY, RIGHT Right 10/30/2018    [facility]    Ileostomy reversal  01/2019    KNEE SURGERY       MOBILIZATION, SPLENIC FLEXURE N/A 10/30/2018    [facility]    REVISION OR CLOSURE, COLOSTOMY N/A 10/30/2018    [facility]    SIGMOIDOSCOPY, FLEXIBLE N/A 1/18/2019    [facility] ENDO       Review of patient's allergies indicates:  No Known Allergies    Current Outpatient Medications   Medication Sig Dispense Refill    albuterol (VENTOLIN HFA) 90 mcg/actuation inhaler Inhale 2 puffs into the lungs every 6 (six) hours as needed for Wheezing. Rescue 1 Inhaler 6    allopurinol (ZYLOPRIM) 100 MG tablet TAKE 1 TABLET (100 MG TOTAL) BY MOUTH ONCE DAILY. 90 tablet 0    aspirin (ECOTRIN) 81 MG EC tablet Take 81 mg by mouth every morning.       BD INSULIN SYRINGE ULTRA-FINE 0.5 mL 31 gauge x 5/16 Syrg USE AS DIRECTED TO INJECT INSULIN TWO TIMES DAILY 60 each 11    bimatoprost (LUMIGAN) 0.03 % ophthalmic drops Place 1 drop into the left eye every evening. 2.5 mL 11    blood sugar diagnostic Strp 1 strip by Misc.(Non-Drug; Combo Route) route 4 (four) times daily. Telcare 120 strip 11    bumetanide (BUMEX) 2 MG tablet MWF take 1 whole tab and on Tues, Thurs, Sat, Sun  Take 1/2 tab 75 tablet 11    cholecalciferol, vitamin D3, (VITAMIN D3) 1,000 unit capsule Take 1,000 Units by mouth once daily.      diphenoxylate-atropine 2.5-0.025 mg (LOMOTIL) 2.5-0.025 mg per tablet Take 1 tablet by mouth 3 (three) times daily.      insulin glargine (LANTUS) 100 unit/mL injection 50 units bid prn when BS< 150 30 mL 11    IRON/VITAMIN B COMPLEX (GERITOL ORAL) Take by mouth.      lancets [facility] For tel care 120 each 11    lisinopril (PRINIVIL,ZESTRIL) 5 MG tablet TAKE 1 TABLET (5 MG TOTAL) BY MOUTH ONCE DAILY. (Patient taking differently: Take 5 mg by mouth every morning. ) 90 tablet 3    nozaseptin (NOZIN) nasal  1 each by Each Nare route 2 (two) times daily. 1 applicator 0    pantoprazole (PROTONIX) 40 MG tablet TAKE 1  "TABLET BY MOUTH EVERY DAY FOR ACID REFLUX 90 tablet 1    pen needle, diabetic (BD ULTRA-FINE JOSLYN PEN NEEDLE) 32 gauge x 5/32" Ndle 1 each by Misc.(Non-Drug; Combo Route) route 4 (four) times daily. 100 each 11    simvastatin (ZOCOR) 10 MG tablet TAKE 1 TABLET (10 MG TOTAL) BY MOUTH EVERY EVENING. 30 tablet 7     No current facility-administered medications for this visit.        Family History   Problem Relation Age of Onset    Cancer Mother     Heart disease Father     Stroke Father     No Known Problems Sister     No Known Problems Brother     No Known Problems Maternal Aunt     No Known Problems Maternal Uncle     No Known Problems Paternal Aunt     No Known Problems Paternal Uncle     No Known Problems Maternal Grandmother     No Known Problems Maternal Grandfather     No Known Problems Paternal Grandmother     No Known Problems Paternal Grandfather     Amblyopia Neg Hx     Blindness Neg Hx     Cataracts Neg Hx     Diabetes Neg Hx     Glaucoma Neg Hx     Hypertension Neg Hx     Macular degeneration Neg Hx     Retinal detachment Neg Hx     Strabismus Neg Hx     Thyroid disease Neg Hx        Social History     Socioeconomic History    Marital status:      Spouse name: Not on file    Number of children: 2    Years of education: Not on file    Highest education level: Not on file   Occupational History    Occupation: [employer]     Employer: [employer]   Social Needs    Financial resource strain: Not on file    Food insecurity:     Worry: Not on file     Inability: Not on file    Transportation needs:     Medical: Not on file     Non-medical: Not on file   Tobacco Use    Smoking status: Never Smoker    Smokeless tobacco: Never Used   Substance and Sexual Activity    Alcohol use: No     Alcohol/week: 0.0 oz     Frequency: Never    Drug use: No    Sexual activity: Not Currently   Lifestyle    Physical activity:     Days per week: Not on file     Minutes per " "session: Not on file    Stress: Not on file   Relationships    Social connections:     Talks on phone: Not on file     Gets together: Not on file     Attends Gnosticism service: Not on file     Active member of club or organization: Not on file     Attends meetings of clubs or organizations: Not on file     Relationship status: Not on file    Intimate partner violence:     Fear of current or ex partner: Not on file     Emotionally abused: Not on file     Physically abused: Not on file     Forced sexual activity: Not on file   Other Topics Concern    Not on file   Social History Narrative    . Lives with spouse. Has 2 children. 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       Review of Systems:  1. GENERAL: patient denies any fever, weight changes, generalized weakness, dizziness.  2. HEENT: patient denies headaches, visual disturbances, swallowing problems, sinus pain, nasal congestion.  3. CARDIOVASCULAR: patient denies chest pain, palpitations.  4. PULMONARY: patient reports SOB with exercise, no coughing, hemoptysis, wheezing.  5. GASTROINTESTINAL: patient denies abdominal pain, nausea, vomiting, diarrhea.  6. GENITOURINARY: patient denies dysuria, hematuria, hesitancy, frequency.  7. EXTREMITIES: patient reports LE edema, no LE cramping.  8. DERMATOLOGY: patient denies rashes, ulcers.  9. NEURO: patient denies tremors, extremity weakness, extremity numbness/tingling.  10. MUSCULOSKELETAL: patient denies joint pain, joint swelling.  11. HEMATOLOGY: patient denies rectal or gum bleeding.  12: PSYCH: patient denies anxiety, depression.      PHYSICAL EXAM:  /88   Pulse 100   Ht 5' 8" (1.727 m)   Wt 86.7 kg (191 lb 2.2 oz)   BMI 29.06 kg/m²     GENERAL: Pleasant gentleman presents to clinic with non-labored breathing.  HEENT: PER, no nasal discharge, no icterus, no oral exudates, moist mucosal membranes.  NECK: no thyroid mass, no lymphadenopathy.  HEART: RRR S1/S2, no " rubs, good peripheral pulses.  LUNGS: CTA bilaterally, no wheezing, breathing is nonlabored.  ABDOMEN: soft, nontender, not distended, bowel sounds are present, no abdominal hernia  EXTREM: Bilateral + 1 pitting LE edema.  SKIN: no rashes, skin is warm and dry.  NEURO: A & O x 3, no obvious focal signs.    LABORATORY RESULTS:    Lab Results   Component Value Date    CREATININE 2.0 (H) 03/26/2019    BUN 24 (H) 03/26/2019     03/26/2019    K 4.7 03/26/2019     03/26/2019    CO2 22 (L) 03/26/2019      Lab Results   Component Value Date    .0 (H) 02/26/2019    CALCIUM 9.5 03/26/2019    PHOS 2.6 (L) 03/21/2019     Lab Results   Component Value Date    ALBUMIN 3.0 (L) 03/21/2019     Lab Results   Component Value Date    WBC 7.41 03/21/2019    HGB 10.7 (L) 03/21/2019    HCT 34.4 (L) 03/21/2019    MCV 92 03/21/2019     03/21/2019     Protein Creatinine Ratios:    Creatinine, Random Ur   Date Value Ref Range Status   03/21/2019 239.0 23.0 - 375.0 mg/dL Final     Comment:     The random urine reference ranges provided were established   for 24 hour urine collections.  No reference ranges exist for  random urine specimens.  Correlate clinically.     02/26/2019 185.0 23.0 - 375.0 mg/dL Final     Comment:     The random urine reference ranges provided were established   for 24 hour urine collections.  No reference ranges exist for  random urine specimens.  Correlate clinically.     12/12/2018 187.6 23.0 - 375.0 mg/dL Final     Comment:     The random urine reference ranges provided were established   for 24 hour urine collections.  No reference ranges exist for  random urine specimens.  Correlate clinically.       Protein, Urine Random   Date Value Ref Range Status   03/21/2019 281 (H) 0 - 15 mg/dL Final     Comment:     The random urine reference ranges provided were established   for 24 hour urine collections.  No reference ranges exist for  random urine specimens.  Correlate clinically.      02/26/2019 201 (H) 0 - 15 mg/dL Final     Comment:     The random urine reference ranges provided were established   for 24 hour urine collections.  No reference ranges exist for  random urine specimens.  Correlate clinically.     11/15/2018 38 (H) 0 - 15 mg/dL Final     Comment:     The random urine reference ranges provided were established   for 24 hour urine collections.  No reference ranges exist for  random urine specimens.  Correlate clinically.       Prot/Creat Ratio, Ur   Date Value Ref Range Status   03/21/2019 1.18 (H) 0.00 - 0.20 Final   02/26/2019 1.09 (H) 0.00 - 0.20 Final   11/15/2018 Unable to calculate 0.00 - 0.20 Final             ASSESSMENT AND PLAN:  63 y.o. male with history of CKD 3/4, HTN, CHF, anemia, DM2, CAD, pulmonary hypertension, CHF presents to the renal clinic for evaluation of renal insufficiency.     1. Renal insufficiency: Patient had recent episode of BROOKE with peak creatinine of 8.2 on 11/15/18. Renal function has improved with IV fluids and creatinine has declined to 2. Patient likely suffers from mild cardiorenal syndrome (EF 20-25%) and creatinine has been fluctuating (in addition to diabetic nephropathy). Will continue Lisinopril for proteinuria.  Patient's renal function will be monitored closely and he will return to the clinic in 4 months for follow up. Patient is on fluid restrictions (about 40-50 ounces per day).     2. Electrolytes: mild hypokalemia has resolved after sodium bicarbonate discontinuation.     3. Acid base status: mild alkalosis has resolved after sodium bicarbonate discontinuation.     4. Volume: Bilateral LE edema. Continue Bumex/metolazone (Cardiology is monitoring this closely).     5. Hypertension: good BP control.     6. Medications: Reviewed. Agree with current medical regimen.    7. DM2: well controlled with last HgA1c at 6.5 (5/2/18).     8. CAD/CHF: As per Cardiology (Dr. Calvillo).     9. Hyperparathyroidism: vitamin D OTC.

## 2019-03-28 NOTE — TELEPHONE ENCOUNTER
----- Message from Lisa Laguerre sent at 3/28/2019  2:45 PM CDT -----  Patient needs call back rg medication [phone]

## 2019-03-29 ENCOUNTER — TELEPHONE (OUTPATIENT)
Dept: CARDIOLOGY | Facility: CLINIC | Age: 64
End: 2019-03-29

## 2019-03-29 ENCOUNTER — TELEPHONE (OUTPATIENT)
Dept: SURGERY | Facility: CLINIC | Age: 64
End: 2019-03-29

## 2019-03-29 DIAGNOSIS — I25.5 ISCHEMIC CARDIOMYOPATHY: Primary | ICD-10-CM

## 2019-03-29 NOTE — TELEPHONE ENCOUNTER
Patient agreeable to labs on Monday. Please schedule. He is eating bananas while on increased diuretic.

## 2019-03-29 NOTE — TELEPHONE ENCOUNTER
The patient has been notified of this information and all questions answered.  Pt will be out of town until his appt wed.

## 2019-03-29 NOTE — TELEPHONE ENCOUNTER
Can take that for additional 2 days then resume normal regimen.  Please make sure he is eating K rich foods while on increased dose of diuretic. He needs BMP, BNP on Monday. Please schedule

## 2019-03-29 NOTE — TELEPHONE ENCOUNTER
----- Message from Rubio Roach sent at 3/29/2019  1:12 PM CDT -----  Contact: nic     Would like to know if pt should still be taking lamotrigine  insurance will not cover any more.       [phone]

## 2019-03-29 NOTE — TELEPHONE ENCOUNTER
I spoke with pts wife. Based on pts home scale his wt is down another 1.5lbs. Only taking bumex 1mg (1/2 tab) bid, no metolazone.  Pt denies any worsening sob and Swelling is improving.

## 2019-04-01 ENCOUNTER — TELEPHONE (OUTPATIENT)
Dept: CARDIOLOGY | Facility: HOSPITAL | Age: 64
End: 2019-04-01

## 2019-04-02 ENCOUNTER — HOSPITAL ENCOUNTER (INPATIENT)
Facility: HOSPITAL | Age: 64
LOS: 1 days | Discharge: HOME | DRG: 291 | End: 2019-04-04
Attending: EMERGENCY MEDICINE | Admitting: INTERNAL MEDICINE
Payer: MEDICARE

## 2019-04-02 DIAGNOSIS — I50.9 CONGESTIVE HEART FAILURE, UNSPECIFIED HF CHRONICITY, UNSPECIFIED HEART FAILURE TYPE: Primary | ICD-10-CM

## 2019-04-02 DIAGNOSIS — R06.02 SHORTNESS OF BREATH: ICD-10-CM

## 2019-04-02 DIAGNOSIS — I50.9 ACUTE ON CHRONIC CONGESTIVE HEART FAILURE: ICD-10-CM

## 2019-04-02 DIAGNOSIS — Z79.4 TYPE 2 DIABETES MELLITUS WITH STAGE 3 CHRONIC KIDNEY DISEASE, WITH LONG-TERM CURRENT USE OF INSULIN: ICD-10-CM

## 2019-04-02 DIAGNOSIS — R79.89 ELEVATED TROPONIN: ICD-10-CM

## 2019-04-02 DIAGNOSIS — I50.9 CHF (CONGESTIVE HEART FAILURE): ICD-10-CM

## 2019-04-02 DIAGNOSIS — E11.22 TYPE 2 DIABETES MELLITUS WITH STAGE 3 CHRONIC KIDNEY DISEASE, WITH LONG-TERM CURRENT USE OF INSULIN: ICD-10-CM

## 2019-04-02 DIAGNOSIS — N18.30 TYPE 2 DIABETES MELLITUS WITH STAGE 3 CHRONIC KIDNEY DISEASE, WITH LONG-TERM CURRENT USE OF INSULIN: ICD-10-CM

## 2019-04-02 LAB
ALBUMIN SERPL BCP-MCNC: 3.3 G/DL (ref 3.5–5.2)
ALP SERPL-CCNC: 109 U/L (ref 55–135)
ALT SERPL W/O P-5'-P-CCNC: 21 U/L (ref 10–44)
ANION GAP SERPL CALC-SCNC: 10 MMOL/L (ref 8–16)
AST SERPL-CCNC: 41 U/L (ref 10–40)
BASOPHILS # BLD AUTO: 0.01 K/UL (ref 0–0.2)
BASOPHILS NFR BLD: 0.2 % (ref 0–1.9)
BILIRUB SERPL-MCNC: 0.8 MG/DL (ref 0.1–1)
BILIRUB UR QL STRIP: NEGATIVE
BNP SERPL-MCNC: 2054 PG/ML (ref 0–99)
BUN SERPL-MCNC: 23 MG/DL (ref 8–23)
CALCIUM SERPL-MCNC: 9.4 MG/DL (ref 8.7–10.5)
CHLORIDE SERPL-SCNC: 101 MMOL/L (ref 95–110)
CLARITY UR: CLEAR
CO2 SERPL-SCNC: 23 MMOL/L (ref 23–29)
COLOR UR: YELLOW
CREAT SERPL-MCNC: 2.1 MG/DL (ref 0.5–1.4)
DACRYOCYTES BLD QL SMEAR: ABNORMAL
DIFFERENTIAL METHOD: ABNORMAL
EOSINOPHIL # BLD AUTO: 0.1 K/UL (ref 0–0.5)
EOSINOPHIL NFR BLD: 2.3 % (ref 0–8)
ERYTHROCYTE [DISTWIDTH] IN BLOOD BY AUTOMATED COUNT: 16.9 % (ref 11.5–14.5)
EST. GFR  (AFRICAN AMERICAN): 37 ML/MIN/1.73 M^2
EST. GFR  (NON AFRICAN AMERICAN): 32 ML/MIN/1.73 M^2
GLUCOSE SERPL-MCNC: 94 MG/DL (ref 70–110)
GLUCOSE UR QL STRIP: NEGATIVE
HCT VFR BLD AUTO: 39.4 % (ref 40–54)
HGB BLD-MCNC: 12.9 G/DL (ref 14–18)
HGB UR QL STRIP: NEGATIVE
INR PPP: 1.2 (ref 0.8–1.2)
KETONES UR QL STRIP: NEGATIVE
LEUKOCYTE ESTERASE UR QL STRIP: NEGATIVE
LYMPHOCYTES # BLD AUTO: 1.6 K/UL (ref 1–4.8)
LYMPHOCYTES NFR BLD: 28.8 % (ref 18–48)
MCH RBC QN AUTO: 28.5 PG (ref 27–31)
MCHC RBC AUTO-ENTMCNC: 32.7 G/DL (ref 32–36)
MCV RBC AUTO: 87 FL (ref 82–98)
MONOCYTES # BLD AUTO: 0.8 K/UL (ref 0.3–1)
MONOCYTES NFR BLD: 13.7 % (ref 4–15)
NEUTROPHILS # BLD AUTO: 3.1 K/UL (ref 1.8–7.7)
NEUTROPHILS NFR BLD: 55 % (ref 38–73)
NITRITE UR QL STRIP: NEGATIVE
OVALOCYTES BLD QL SMEAR: ABNORMAL
PH UR STRIP: 7 [PH] (ref 5–8)
PLATELET # BLD AUTO: 193 K/UL (ref 150–350)
PMV BLD AUTO: 11.4 FL (ref 9.2–12.9)
POCT GLUCOSE: 101 MG/DL (ref 70–110)
POCT GLUCOSE: 112 MG/DL (ref 70–110)
POIKILOCYTOSIS BLD QL SMEAR: SLIGHT
POLYCHROMASIA BLD QL SMEAR: ABNORMAL
POTASSIUM SERPL-SCNC: 4.6 MMOL/L (ref 3.5–5.1)
PROT SERPL-MCNC: 7.7 G/DL (ref 6–8.4)
PROT UR QL STRIP: NEGATIVE
PROTHROMBIN TIME: 13 SEC (ref 9–12.5)
RBC # BLD AUTO: 4.52 M/UL (ref 4.6–6.2)
SODIUM SERPL-SCNC: 134 MMOL/L (ref 136–145)
SP GR UR STRIP: 1.01 (ref 1–1.03)
SPHEROCYTES BLD QL SMEAR: ABNORMAL
STOMATOCYTES BLD QL SMEAR: PRESENT
TARGETS BLD QL SMEAR: ABNORMAL
TROPONIN I SERPL DL<=0.01 NG/ML-MCNC: 0.27 NG/ML (ref 0–0.03)
TROPONIN I SERPL DL<=0.01 NG/ML-MCNC: 0.36 NG/ML (ref 0–0.03)
URN SPEC COLLECT METH UR: NORMAL
UROBILINOGEN UR STRIP-ACNC: NEGATIVE EU/DL
WBC # BLD AUTO: 5.91 K/UL (ref 3.9–12.7)

## 2019-04-02 PROCEDURE — 25000003 PHARM REV CODE 250: Performed by: NURSE PRACTITIONER

## 2019-04-02 PROCEDURE — G0378 HOSPITAL OBSERVATION PER HR: HCPCS

## 2019-04-02 PROCEDURE — 80053 COMPREHEN METABOLIC PANEL: CPT

## 2019-04-02 PROCEDURE — 99223 1ST HOSP IP/OBS HIGH 75: CPT | Mod: 25,,, | Performed by: INTERNAL MEDICINE

## 2019-04-02 PROCEDURE — 84484 ASSAY OF TROPONIN QUANT: CPT | Mod: 91

## 2019-04-02 PROCEDURE — 99223 PR INITIAL HOSPITAL CARE,LEVL III: ICD-10-PCS | Mod: 25,,, | Performed by: INTERNAL MEDICINE

## 2019-04-02 PROCEDURE — 99285 EMERGENCY DEPT VISIT HI MDM: CPT | Mod: 25

## 2019-04-02 PROCEDURE — 81003 URINALYSIS AUTO W/O SCOPE: CPT

## 2019-04-02 PROCEDURE — 93005 ELECTROCARDIOGRAM TRACING: CPT

## 2019-04-02 PROCEDURE — 83036 HEMOGLOBIN GLYCOSYLATED A1C: CPT

## 2019-04-02 PROCEDURE — 96374 THER/PROPH/DIAG INJ IV PUSH: CPT

## 2019-04-02 PROCEDURE — 63600175 PHARM REV CODE 636 W HCPCS: Performed by: EMERGENCY MEDICINE

## 2019-04-02 PROCEDURE — 93010 ELECTROCARDIOGRAM REPORT: CPT | Mod: 59,,, | Performed by: INTERNAL MEDICINE

## 2019-04-02 PROCEDURE — 93010 EKG 12-LEAD: ICD-10-PCS | Mod: 59,,, | Performed by: INTERNAL MEDICINE

## 2019-04-02 PROCEDURE — 25000003 PHARM REV CODE 250: Performed by: EMERGENCY MEDICINE

## 2019-04-02 PROCEDURE — 36415 COLL VENOUS BLD VENIPUNCTURE: CPT

## 2019-04-02 PROCEDURE — 85610 PROTHROMBIN TIME: CPT

## 2019-04-02 PROCEDURE — 83880 ASSAY OF NATRIURETIC PEPTIDE: CPT

## 2019-04-02 PROCEDURE — 85025 COMPLETE CBC W/AUTO DIFF WBC: CPT

## 2019-04-02 RX ORDER — SODIUM CHLORIDE 0.9 % (FLUSH) 0.9 %
10 SYRINGE (ML) INJECTION
Status: DISCONTINUED | OUTPATIENT
Start: 2019-04-02 | End: 2019-04-04 | Stop reason: HOSPADM

## 2019-04-02 RX ORDER — ALLOPURINOL 100 MG/1
100 TABLET ORAL DAILY
Status: DISCONTINUED | OUTPATIENT
Start: 2019-04-03 | End: 2019-04-04 | Stop reason: HOSPADM

## 2019-04-02 RX ORDER — IBUPROFEN 200 MG
24 TABLET ORAL
Status: DISCONTINUED | OUTPATIENT
Start: 2019-04-02 | End: 2019-04-04 | Stop reason: HOSPADM

## 2019-04-02 RX ORDER — BUMETANIDE 1 MG/1
2 TABLET ORAL DAILY
Status: DISCONTINUED | OUTPATIENT
Start: 2019-04-02 | End: 2019-04-04 | Stop reason: HOSPADM

## 2019-04-02 RX ORDER — ASPIRIN 325 MG
325 TABLET ORAL DAILY
Status: CANCELLED | OUTPATIENT
Start: 2019-04-03

## 2019-04-02 RX ORDER — ASPIRIN 81 MG/1
81 TABLET ORAL EVERY MORNING
Status: DISCONTINUED | OUTPATIENT
Start: 2019-04-03 | End: 2019-04-03

## 2019-04-02 RX ORDER — ASPIRIN 325 MG
325 TABLET ORAL
Status: COMPLETED | OUTPATIENT
Start: 2019-04-02 | End: 2019-04-02

## 2019-04-02 RX ORDER — FUROSEMIDE 10 MG/ML
60 INJECTION INTRAMUSCULAR; INTRAVENOUS
Status: COMPLETED | OUTPATIENT
Start: 2019-04-02 | End: 2019-04-02

## 2019-04-02 RX ORDER — INSULIN ASPART 100 [IU]/ML
0-5 INJECTION, SOLUTION INTRAVENOUS; SUBCUTANEOUS
Status: DISCONTINUED | OUTPATIENT
Start: 2019-04-02 | End: 2019-04-04 | Stop reason: HOSPADM

## 2019-04-02 RX ORDER — IBUPROFEN 200 MG
16 TABLET ORAL
Status: DISCONTINUED | OUTPATIENT
Start: 2019-04-02 | End: 2019-04-04 | Stop reason: HOSPADM

## 2019-04-02 RX ORDER — HYDRALAZINE HYDROCHLORIDE 20 MG/ML
10 INJECTION INTRAMUSCULAR; INTRAVENOUS EVERY 6 HOURS PRN
Status: DISCONTINUED | OUTPATIENT
Start: 2019-04-02 | End: 2019-04-04 | Stop reason: HOSPADM

## 2019-04-02 RX ORDER — PANTOPRAZOLE SODIUM 40 MG/1
40 TABLET, DELAYED RELEASE ORAL DAILY
Status: DISCONTINUED | OUTPATIENT
Start: 2019-04-02 | End: 2019-04-04 | Stop reason: HOSPADM

## 2019-04-02 RX ORDER — BUMETANIDE 2 MG/1
2 TABLET ORAL DAILY
Status: DISCONTINUED | OUTPATIENT
Start: 2019-04-03 | End: 2019-04-02

## 2019-04-02 RX ORDER — SIMVASTATIN 5 MG/1
10 TABLET, FILM COATED ORAL NIGHTLY
Status: DISCONTINUED | OUTPATIENT
Start: 2019-04-02 | End: 2019-04-04 | Stop reason: HOSPADM

## 2019-04-02 RX ORDER — METOLAZONE 5 MG/1
5 TABLET ORAL ONCE
Status: COMPLETED | OUTPATIENT
Start: 2019-04-02 | End: 2019-04-02

## 2019-04-02 RX ORDER — GLUCAGON 1 MG
1 KIT INJECTION
Status: DISCONTINUED | OUTPATIENT
Start: 2019-04-02 | End: 2019-04-04 | Stop reason: HOSPADM

## 2019-04-02 RX ADMIN — METOLAZONE 5 MG: 5 TABLET ORAL at 02:04

## 2019-04-02 RX ADMIN — ASPIRIN 325 MG ORAL TABLET 325 MG: 325 PILL ORAL at 01:04

## 2019-04-02 RX ADMIN — FUROSEMIDE 60 MG: 10 INJECTION, SOLUTION INTRAMUSCULAR; INTRAVENOUS at 12:04

## 2019-04-02 RX ADMIN — PANTOPRAZOLE SODIUM 40 MG: 40 TABLET, DELAYED RELEASE ORAL at 04:04

## 2019-04-02 RX ADMIN — BUMETANIDE 2 MG: 1 TABLET ORAL at 04:04

## 2019-04-02 RX ADMIN — SIMVASTATIN 10 MG: 5 TABLET, FILM COATED ORAL at 09:04

## 2019-04-02 NOTE — ASSESSMENT & PLAN NOTE
--likely 2/2 fluid overload  --aggressive diuresis  --strict I&O  --daily weights  --cardiac diet  --supplemental oxygen to maintain sats >92%

## 2019-04-02 NOTE — ASSESSMENT & PLAN NOTE
--Cr. 2.1, baseline 1.6, eGFR 37, baseline: 51  --hold lisinopril for now, will restart once back to baseline  --likely 2/2 fluid overload  --avoid nephrotoxic agents  --aggressive diuresis  --monitor

## 2019-04-02 NOTE — PLAN OF CARE
Problem: Adult Inpatient Plan of Care  Goal: Plan of Care Review  Outcome: Ongoing (interventions implemented as appropriate)  Discussed Plan of Care with patient and verbalized understanding - Patient remains AAOx4 - remains free of falls, accidents and trauma during the day shift. Bed in in the low position and the call light is within reach. Accuchecks AC/HS with sliding scale - Q6 Troponin and Daily CBC and CMP. Will continue to monitor

## 2019-04-02 NOTE — ASSESSMENT & PLAN NOTE
-Presents with SOB/decompensated combined CHF  -Continue IV diuresis  -Continue ACEI  -BB previously d/c'd due to issues with hypotension; will re-evaluate this admission  -Continue ASA, statin  -Repeat echo in AM

## 2019-04-02 NOTE — HPI
Mr. Choudhury is a 63 year old male patient whose current medical conditions include CAD/ICM, DCM, LBBB, HTN, CRI, DM s/p CRT-ICD, PHTN, dyslipidemia, obesity, and s/p colostomy reversal/parastomal repair and ileostomy placement and reversal who presented to [facility] ED today with a chief complaint of worsening SOB over the past 1-2 weeks. Associated symptoms included weight gain, orthopnea, and bilateral lower extremity edema. He denied any associated davion chest pain, lightheadedness, dizziness, near syncope, or syncope. Initial workup in ED revealed troponin of 0.357, BNP> 2,000, and creatinine of 2.1. Patient subsequently admitted for further evaluation and treatment. Cardiology consulted to assist with management. Patient seen and examined today while in ED. He is well known to cardiology service. Seen last week in clinic and had Zaroxolyn added and po diuretics increased without much improvement in symptoms. Still complains of SOB and LE edema at time of exam. Remains chest pain free. No other complaints. He reports compliance with his medications. Variable  Compliance with diet. Chart reviewed. Repeat troponin pending. 2D echo 10/18 showed EF of 30-35%, DD. EKG reviewed and showed no acute ischemic changes.

## 2019-04-02 NOTE — ASSESSMENT & PLAN NOTE
--BNP 2054  --IV furosemide given in ED  --metolazone and bumex given 1 hour apart  --strict I&O  --daily weights  --cardiology consulted

## 2019-04-02 NOTE — ASSESSMENT & PLAN NOTE
-Initial troponin 0.357  -Trend  -Secondary to demand ischemia from decompensated combined CHF and underlying CKD  -Continue ASA, statin  -Continue ACEi  -Will add BB if BP can tolerate  -Repeat echo in AM

## 2019-04-02 NOTE — SUBJECTIVE & OBJECTIVE
Past Medical History:   Diagnosis Date    Arthritis     CAD (coronary artery disease)     Cataract     Chronic combined systolic and diastolic congestive heart failure 3/24/2015    CKD (chronic kidney disease), stage III     Diabetes mellitus type II      AM    Diabetic retinopathy     anti Veg F injections for macular edema    Diverticulitis of colon     ED (erectile dysfunction)     Encounter for blood transfusion     Glaucoma     HTN (hypertension)     Hyperlipidemia     Ischemic cardiomyopathy     Obesity     JAHAIRA on CPAP     Pacemaker     Pulmonary HTN        Past Surgical History:   Procedure Laterality Date    CARDIAC CATHETERIZATION  2009    CHOLECYSTECTOMY      CLOSURE, ILEOSTOMY N/A 1/22/2019    [facility]    COLECTOMY-SIGMOID N/A 4/22/2016    [facility] OR    COLON SURGERY      Sigmoid resection    COLONOSCOPY N/A 10/11/2018    [facility] Quinn [facility] ENDO    COLOSTOMY N/A 4/22/2016    [facility] OR    CREATION, ILEOSTOMY N/A 10/30/2018    [facility] OR    EYE SURGERY      HEMICOLECTOMY, RIGHT Right 10/30/2018    [facility] OR    Ileostomy reversal  01/2019    KNEE SURGERY      MOBILIZATION, SPLENIC FLEXURE N/A 10/30/2018    [facility] OR    REVISION OR CLOSURE, COLOSTOMY N/A 10/30/2018    [facility] OR    SIGMOIDOSCOPY, FLEXIBLE N/A 1/18/2019    Performed by Quinn [facility] at [facility] ENDO       Review of patient's allergies indicates:  No Known Allergies    No current facility-administered medications on file prior to encounter.      Current Outpatient Medications on File Prior to Encounter   Medication Sig    albuterol (VENTOLIN HFA) 90 mcg/actuation inhaler Inhale 2 puffs into the lungs every 6 (six) hours as needed for  "Wheezing. Rescue    allopurinol (ZYLOPRIM) 100 MG tablet TAKE 1 TABLET (100 MG TOTAL) BY MOUTH ONCE DAILY.    aspirin (ECOTRIN) 81 MG EC tablet Take 81 mg by mouth every morning.     BD INSULIN SYRINGE ULTRA-FINE 0.5 mL 31 gauge x 5/16 Syrg USE AS DIRECTED TO INJECT INSULIN TWO TIMES DAILY    bimatoprost (LUMIGAN) 0.03 % ophthalmic drops Place 1 drop into the left eye every evening.    blood sugar diagnostic Strp 1 strip by Misc.(Non-Drug; Combo Route) route 4 (four) times daily. Telcare    cholecalciferol, vitamin D3, (VITAMIN D3) 1,000 unit capsule Take 1,000 Units by mouth once daily.    insulin glargine (LANTUS) 100 unit/mL injection 50 units bid prn when BS< 150    IRON/VITAMIN B COMPLEX (GERITOL ORAL) Take by mouth.    lancets Misc For tel care    lisinopril (PRINIVIL,ZESTRIL) 5 MG tablet TAKE 1 TABLET (5 MG TOTAL) BY MOUTH ONCE DAILY. (Patient taking differently: Take 5 mg by mouth every morning. )    nozaseptin (NOZIN) nasal  1 each by Each Nare route 2 (two) times daily.    pantoprazole (PROTONIX) 40 MG tablet TAKE 1 TABLET BY MOUTH EVERY DAY FOR ACID REFLUX    pen needle, diabetic (BD ULTRA-FINE JOSLYN PEN NEEDLE) 32 gauge x 5/32" Ndle 1 each by Misc.(Non-Drug; Combo Route) route 4 (four) times daily.    simvastatin (ZOCOR) 10 MG tablet TAKE 1 TABLET (10 MG TOTAL) BY MOUTH EVERY EVENING.    bumetanide (BUMEX) 2 MG tablet MWF take 1 whole tab and on Tues, Thurs, Sat, Sun  Take 1/2 tab    [DISCONTINUED] diphenoxylate-atropine 2.5-0.025 mg (LOMOTIL) 2.5-0.025 mg per tablet Take 1 tablet by mouth 3 (three) times daily.     Family History     Problem Relation (Age of Onset)    Cancer Mother    Heart disease Father    No Known Problems Sister, Brother, Maternal Aunt, Maternal Uncle, Paternal Aunt, Paternal Uncle, Maternal Grandmother, Maternal Grandfather, Paternal Grandmother, Paternal Grandfather    Stroke Father        Tobacco Use    Smoking status: Never Smoker    Smokeless tobacco: " Never Used   Substance and Sexual Activity    Alcohol use: No     Alcohol/week: 0.0 oz     Frequency: Never    Drug use: No    Sexual activity: Not Currently     Review of Systems   Constitution: Negative.   HENT: Negative.    Eyes: Negative.    Cardiovascular: Positive for dyspnea on exertion, leg swelling and orthopnea.   Respiratory: Positive for shortness of breath.    Endocrine: Negative.    Hematologic/Lymphatic: Negative.    Skin: Negative.    Musculoskeletal: Negative.    Gastrointestinal: Negative.    Genitourinary: Negative.    Neurological: Negative.    Psychiatric/Behavioral: Negative.    Allergic/Immunologic: Negative.      Objective:     Vital Signs (Most Recent):  Temp: 97.4 °F (36.3 °C) (04/02/19 1534)  Pulse: 101 (04/02/19 1534)  Resp: 18 (04/02/19 1534)  BP: 120/85 (04/02/19 1534)  SpO2: 99 % (04/02/19 1534) Vital Signs (24h Range):  Temp:  [97.4 °F (36.3 °C)-98 °F (36.7 °C)] 97.4 °F (36.3 °C)  Pulse:  [] 101  Resp:  [18-22] 18  SpO2:  [98 %-100 %] 99 %  BP: (116-127)/(71-87) 120/85     Weight: 82 kg (180 lb 12.4 oz)  Body mass index is 27.49 kg/m².    SpO2: 99 %  O2 Device (Oxygen Therapy): room air      Intake/Output Summary (Last 24 hours) at 4/2/2019 1602  Last data filed at 4/2/2019 1352  Gross per 24 hour   Intake --   Output 350 ml   Net -350 ml       Lines/Drains/Airways     Peripheral Intravenous Line                 Peripheral IV - Single Lumen 04/02/19 1210 Left Forearm less than 1 day                Physical Exam   Constitutional: He is oriented to person, place, and time. He appears well-developed and well-nourished. No distress.   HENT:   Head: Normocephalic and atraumatic.   Eyes: Pupils are equal, round, and reactive to light. Right eye exhibits no discharge. Left eye exhibits no discharge.   Neck: Neck supple. JVD present.   Cardiovascular: Normal rate, regular rhythm, S1 normal, S2 normal and normal heart sounds.   No murmur heard.  Pulmonary/Chest: Effort normal. No  respiratory distress. He has no wheezes. He has rales (faint at bases).   AICD site well-healed   Abdominal: Soft. He exhibits no distension.   Musculoskeletal: He exhibits edema (1+ BLE).   Neurological: He is alert and oriented to person, place, and time.   Skin: Skin is warm and dry. He is not diaphoretic. No erythema.   Psychiatric: He has a normal mood and affect. His behavior is normal. Thought content normal.   Nursing note and vitals reviewed.      Significant Labs:   CMP   Recent Labs   Lab 04/01/19  1223 04/02/19  1215   * 134*   K 4.8 4.6    101   CO2 23 23    94   BUN 24* 23   CREATININE 2.3* 2.1*   CALCIUM 9.5 9.4   PROT  --  7.7   ALBUMIN  --  3.3*   BILITOT  --  0.8   ALKPHOS  --  109   AST  --  41*   ALT  --  21   ANIONGAP 11 10   ESTGFRAFRICA 33* 37*   EGFRNONAA 29* 32*   , CBC   Recent Labs   Lab 04/02/19  1215   WBC 5.91   HGB 12.9*   HCT 39.4*      , Troponin   Recent Labs   Lab 04/02/19  1215   TROPONINI 0.357*    and All pertinent lab results from the last 24 hours have been reviewed.    Significant Imaging: Echocardiogram:   2D echo with color flow doppler:   Results for orders placed or performed in visit on 10/01/18   2D echo with color flow doppler   Result Value Ref Range    QEF 30 (A) 55 - 65    Diastolic Dysfunction Yes (A)     Aortic Valve Regurgitation MILD TO MODERATE (A)     Est. PA Systolic Pressure 35.49     Tricuspid Valve Regurgitation MILD    , EKG: Reviewed and X-Ray: CXR: X-Ray Chest 1 View (CXR): No results found for this visit on 04/02/19. and X-Ray Chest PA and Lateral (CXR): No results found for this visit on 04/02/19.

## 2019-04-02 NOTE — ASSESSMENT & PLAN NOTE
-Presents with volume overload  -Continue IV diuresis and assess response  -Continue ACEi   -Re-evaluate BP to see if patient can tolerate BB

## 2019-04-02 NOTE — ED NOTES
IV access attempted x3. Twice by KRISTIN medrano. Once by myself. Not successful. Janet will attempt with ultrasound to place a midline.

## 2019-04-02 NOTE — ASSESSMENT & PLAN NOTE
--1st trop 0.357  --likely demand ischemia 2/2 fluid overload  --pt denies chest pain  --aggressive diuresis  --cardiology following  --trend troponins

## 2019-04-02 NOTE — ED PROVIDER NOTES
"SCRIBE #1 NOTE: I, Nieves Otto, am scribing for, and in the presence of, Ney Og MD. I have scribed the entire note.       History     Chief Complaint   Patient presents with    Shortness of Breath     pt c/o SOB and BLE swelling, hx of CHF     Review of patient's allergies indicates:  No Known Allergies      History of Present Illness     HPI    4/2/2019, 11:25 AM  History obtained from the patient   Pt is a poor historian      History of Present Illness: Yan Choudhury is a 63 y.o. male patient with a PMHx of CAD, CHF, CKD, DM, HTN, and HLD who presents to the Emergency Department for evaluation of SOB when laying down flat. Pt is also c/o BLE swelling. Pt states "I have fluid in my legs." Symptoms are constant and moderate in severity. No mitigating or exacerbating factors reported. No associated sxs included. Patient denies any fever, chills, cough, wheezing, CP, palpitations, abd pain, n/v, dysuria, urinary frequency/urgency, dizziness, extremity weakness/numbness, and all other sxs at this time. Prior Tx includes Bumex. No further complaints or concerns at this time.       Arrival mode: Personal vehicle    PCP: Sandra Estevez MD        Past Medical History:  Past Medical History:   Diagnosis Date    Arthritis     CAD (coronary artery disease)     Cataract     Chronic combined systolic and diastolic congestive heart failure 3/24/2015    CKD (chronic kidney disease), stage III     Diabetes mellitus type II      AM    Diabetic retinopathy     anti Veg F injections for macular edema    Diverticulitis of colon     ED (erectile dysfunction)     Encounter for blood transfusion     Glaucoma     HTN (hypertension)     Hyperlipidemia     Ischemic cardiomyopathy     Obesity     JAHAIRA on CPAP     Pacemaker     Pulmonary HTN        Past Surgical History:  Past Surgical History:   Procedure Laterality Date    CARDIAC CATHETERIZATION  2009    CHOLECYSTECTOMY      CLOSURE, ILEOSTOMY " N/A 1/22/2019    [facility]    COLECTOMY-SIGMOID N/A 4/22/2016    [facility]    COLON SURGERY      Sigmoid resection    COLONOSCOPY N/A 10/11/2018    [facility]    COLOSTOMY N/A 4/22/2016    [facility]    CREATION, ILEOSTOMY N/A 10/30/2018    [facility]    EYE SURGERY      HEMICOLECTOMY, RIGHT Right 10/30/2018    [facility]    Ileostomy reversal  01/2019    KNEE SURGERY      MOBILIZATION, SPLENIC FLEXURE N/A 10/30/2018    [facility]    REVISION OR CLOSURE, COLOSTOMY N/A 10/30/2018    [facility]    SIGMOIDOSCOPY, FLEXIBLE N/A 1/18/2019    [facility]         Family History:  Family History   Problem Relation Age of Onset    Cancer Mother     Heart disease Father     Stroke Father     No Known Problems Sister     No Known Problems Brother     No Known Problems Maternal Aunt     No Known Problems Maternal Uncle     No Known Problems Paternal Aunt     No Known Problems Paternal Uncle     No Known Problems Maternal Grandmother     No Known Problems Maternal Grandfather     No Known Problems Paternal Grandmother     No Known Problems Paternal Grandfather     Amblyopia Neg Hx     Blindness Neg Hx     Cataracts Neg Hx     Diabetes Neg Hx     Glaucoma Neg Hx     Hypertension Neg Hx     Macular degeneration Neg Hx     Retinal detachment Neg Hx     Strabismus Neg Hx     Thyroid disease Neg Hx        Social History:  Social History     Tobacco Use    Smoking status: Never Smoker    Smokeless tobacco: Never Used   Substance and Sexual Activity    Alcohol use: No     Alcohol/week: 0.0 oz     Frequency: Never    Drug use: No    Sexual activity: Not Currently        Review of Systems      Review of Systems   Constitutional: Negative for chills and fever.   HENT: Negative for rhinorrhea and sore throat.    Respiratory: Positive for shortness of breath. Negative for cough and wheezing.    Cardiovascular: Positive for leg swelling (BLE). Negative for chest pain and palpitations.   Gastrointestinal: Negative for abdominal pain, diarrhea, nausea and vomiting.   Genitourinary: Negative for dysuria, frequency and urgency.   Musculoskeletal: Negative for back pain.   Skin: Negative for rash.   Neurological: Negative for dizziness, weakness and numbness.   All other systems reviewed and are negative.     Physical Exam     Initial Vitals [04/02/19 1054]   BP Pulse Resp Temp SpO2   117/74 101 (!) 22 97.9 °F (36.6 °C) 100 %      MAP       --          Physical Exam  Nursing Notes and Vital Signs Reviewed.  Constitutional: Patient is in no acute distress. Well-developed and well-nourished.  Head: Atraumatic. Normocephalic.  Eyes: PERRL. EOM intact. Conjunctivae are not pale. No scleral icterus.  ENT: Mucous membranes are moist. Oropharynx is clear and symmetric.    Neck: Supple. Full ROM. No lymphadenopathy.  Cardiovascular: Regular rate. Regular rhythm. No murmurs, rubs, or gallops. Distal pulses are 2+ and symmetric.  Pulmonary/Chest: No respiratory distress. Crackles at lung bases. No wheezing or rales.  Abdominal: Soft and non-distended.  There is no tenderness.  No rebound, guarding, or rigidity.   Musculoskeletal: Moves all extremities. No obvious deformities. No edema. No calf tenderness.  BLE: No evident deformity. 1+ edema to BLE. ROM is normal. Cap refill distally is <2 seconds. DP and PT pulses are equal and 2+ bilaterally. No motor deficit. No distal sensory deficit.  Skin: Warm and dry.  Neurological:  Alert, awake, and appropriate.  Normal speech.  No acute focal neurological deficits are appreciated.  Psychiatric: Normal affect. Good eye contact. Appropriate in content.     ED Course  "  Procedures  ED Vital Signs:  Vitals:    04/02/19 1054 04/02/19 1128 04/02/19 1300 04/02/19 1302   BP: 117/74   116/87   Pulse: 101 92  96   Resp: (!) 22  20 (!) 22   Temp: 97.9 °F (36.6 °C)      TempSrc: Oral      SpO2: 100%   98%   Weight: 84.4 kg (186 lb 1.1 oz)      Height: 5' 8" (1.727 m)          Abnormal Lab Results:  Labs Reviewed   CBC W/ AUTO DIFFERENTIAL - Abnormal; Notable for the following components:       Result Value    RBC 4.52 (*)     Hemoglobin 12.9 (*)     Hematocrit 39.4 (*)     RDW 16.9 (*)     All other components within normal limits   COMPREHENSIVE METABOLIC PANEL - Abnormal; Notable for the following components:    Sodium 134 (*)     Creatinine 2.1 (*)     Albumin 3.3 (*)     AST 41 (*)     eGFR if  37 (*)     eGFR if non  32 (*)     All other components within normal limits   TROPONIN I - Abnormal; Notable for the following components:    Troponin I 0.357 (*)     All other components within normal limits   B-TYPE NATRIURETIC PEPTIDE - Abnormal; Notable for the following components:    BNP 2,054 (*)     All other components within normal limits   PROTIME-INR - Abnormal; Notable for the following components:    Prothrombin Time 13.0 (*)     All other components within normal limits   URINALYSIS, REFLEX TO URINE CULTURE    Narrative:     Preferred Collection Type->Urine, Clean Catch        All Lab Results:  Results for orders placed or performed during the hospital encounter of 04/02/19   CBC auto differential   Result Value Ref Range    WBC 5.91 3.90 - 12.70 K/uL    RBC 4.52 (L) 4.60 - 6.20 M/uL    Hemoglobin 12.9 (L) 14.0 - 18.0 g/dL    Hematocrit 39.4 (L) 40.0 - 54.0 %    MCV 87 82 - 98 fL    MCH 28.5 27.0 - 31.0 pg    MCHC 32.7 32.0 - 36.0 g/dL    RDW 16.9 (H) 11.5 - 14.5 %    Platelets 193 150 - 350 K/uL    MPV 11.4 9.2 - 12.9 fL    Gran # (ANC) 3.1 1.8 - 7.7 K/uL    Lymph # 1.6 1.0 - 4.8 K/uL    Mono # 0.8 0.3 - 1.0 K/uL    Eos # 0.1 0.0 - 0.5 K/uL    " Baso # 0.01 0.00 - 0.20 K/uL    Gran% 55.0 38.0 - 73.0 %    Lymph% 28.8 18.0 - 48.0 %    Mono% 13.7 4.0 - 15.0 %    Eosinophil% 2.3 0.0 - 8.0 %    Basophil% 0.2 0.0 - 1.9 %    Poik Slight     Poly Occasional     Ovalocytes Occasional     Target Cells Occasional     Tear Drop Cells Occasional     Stomatocytes Present     Spherocytes Occasional     Differential Method Automated    Comprehensive metabolic panel   Result Value Ref Range    Sodium 134 (L) 136 - 145 mmol/L    Potassium 4.6 3.5 - 5.1 mmol/L    Chloride 101 95 - 110 mmol/L    CO2 23 23 - 29 mmol/L    Glucose 94 70 - 110 mg/dL    BUN, Bld 23 8 - 23 mg/dL    Creatinine 2.1 (H) 0.5 - 1.4 mg/dL    Calcium 9.4 8.7 - 10.5 mg/dL    Total Protein 7.7 6.0 - 8.4 g/dL    Albumin 3.3 (L) 3.5 - 5.2 g/dL    Total Bilirubin 0.8 0.1 - 1.0 mg/dL    Alkaline Phosphatase 109 55 - 135 U/L    AST 41 (H) 10 - 40 U/L    ALT 21 10 - 44 U/L    Anion Gap 10 8 - 16 mmol/L    eGFR if African American 37 (A) >60 mL/min/1.73 m^2    eGFR if non African American 32 (A) >60 mL/min/1.73 m^2   Troponin I   Result Value Ref Range    Troponin I 0.357 (H) 0.000 - 0.026 ng/mL   Brain natriuretic peptide   Result Value Ref Range    BNP 2,054 (H) 0 - 99 pg/mL   Protime-INR   Result Value Ref Range    Prothrombin Time 13.0 (H) 9.0 - 12.5 sec    INR 1.2 0.8 - 1.2   Urinalysis, Reflex to Urine Culture Urine, Clean Catch   Result Value Ref Range    Specimen UA Urine, Clean Catch     Color, UA Yellow Yellow, Straw, Polina    Appearance, UA Clear Clear    pH, UA 7.0 5.0 - 8.0    Specific Gravity, UA 1.010 1.005 - 1.030    Protein, UA Negative Negative    Glucose, UA Negative Negative    Ketones, UA Negative Negative    Bilirubin (UA) Negative Negative    Occult Blood UA Negative Negative    Nitrite, UA Negative Negative    Urobilinogen, UA Negative <2.0 EU/dL    Leukocytes, UA Negative Negative       Imaging Results:  Imaging Results          X-Ray Chest AP Portable (Final result)  Result time 04/02/19  12:30:40    Final result by Umair Rodriguez MD (04/02/19 12:30:40)                 Impression:      Chronic cardiomegaly.  No acute findings.      Electronically signed by: Lonnie Rodriguez MD  Date:    04/02/2019  Time:    12:30             Narrative:    EXAMINATION:  XR CHEST AP PORTABLE    CLINICAL HISTORY:  CHF;    TECHNIQUE:  Single frontal view of the chest was performed.    COMPARISON:  03/26/2019    FINDINGS:  There is chronic moderately severe cardiomegaly appear a pacemaker remains in place by a left-sided approach.  Chronic scarring is present in the lingula.  The lungs appear free of any active infiltrate or effusion.                                 The EKG was ordered, reviewed, and independently interpreted by the ED provider.  Interpretation time: 1109  Rate: 95 BPM  Rhythm: Electronic ventricular pacemaker.  Interpretation: No acute ST changes. No STEMI.             The Emergency Provider reviewed the vital signs and test results, which are outlined above.     ED Discussion     1:48 PM: Discussed case with JORGE Willett ([facility]). Dr. Galvin agrees with current care and management of pt and accepts admission.   Admitting Service: [facility]  Admitting Physician: Dr. Galvin  Admit to: Observation    1:56 PM: Re-evaluated pt. I have discussed test results, shared treatment plan, and the need for admission with patient and family at bedside. Pt and family express understanding at this time and agree with all information. All questions answered. Pt and family have no further questions or concerns at this time. Pt is ready for admit.      ED Medication(s):  Medications   furosemide injection 60 mg (60 mg Intravenous Given 4/2/19 1221)   aspirin tablet 325 mg (325 mg Oral Given 4/2/19 1347)       New Prescriptions    No medications on file                 Medical Decision Making:   Clinical Tests:   Lab Tests: Reviewed and Ordered  Radiological Study: Ordered and  Reviewed  Medical Tests: Reviewed and Ordered             Scribe Attestation:   Scribe #1: I performed the above scribed service and the documentation accurately describes the services I performed. I attest to the accuracy of the note.     Attending:   Physician Attestation Statement for Scribe #1: I, Ney Og MD, personally performed the services described in this documentation, as scribed by Nieves Menjivar, in my presence, and it is both accurate and complete.           Clinical Impression       ICD-10-CM ICD-9-CM   1. Congestive heart failure, unspecified HF chronicity, unspecified heart failure type I50.9 428.0   2. Shortness of breath R06.02 786.05   3. Elevated troponin R74.8 790.6       Disposition:   Disposition: Placed in Observation  Condition: Stable       Ney Og MD  04/02/19 2340

## 2019-04-02 NOTE — SUBJECTIVE & OBJECTIVE
Past Medical History:   Diagnosis Date    Arthritis     CAD (coronary artery disease)     Cataract     Chronic combined systolic and diastolic congestive heart failure 3/24/2015    CKD (chronic kidney disease), stage III     Diabetes mellitus type II      AM    Diabetic retinopathy     anti Veg F injections for macular edema    Diverticulitis of colon     ED (erectile dysfunction)     Encounter for blood transfusion     Glaucoma     HTN (hypertension)     Hyperlipidemia     Ischemic cardiomyopathy     Obesity     JAHAIRA on CPAP     Pacemaker     Pulmonary HTN        Past Surgical History:   Procedure Laterality Date    CARDIAC CATHETERIZATION  2009    CHOLECYSTECTOMY      CLOSURE, ILEOSTOMY N/A 1/22/2019    [facility]    COLECTOMY-SIGMOID N/A 4/22/2016    [facility]    COLON SURGERY      Sigmoid resection    COLONOSCOPY N/A 10/11/2018    [facility] ENDO    COLOSTOMY N/A 4/22/2016    [facility]    CREATION, ILEOSTOMY N/A 10/30/2018    [facility]    EYE SURGERY      HEMICOLECTOMY, RIGHT Right 10/30/2018    [facility]    Ileostomy reversal  01/2019    KNEE SURGERY      MOBILIZATION, SPLENIC FLEXURE N/A 10/30/2018    [facility]    REVISION OR CLOSURE, COLOSTOMY N/A 10/30/2018    [facility]    SIGMOIDOSCOPY, FLEXIBLE N/A 1/18/2019    [facility] ENDO       Review of patient's allergies indicates:  No Known Allergies    No current facility-administered medications on file prior to encounter.      Current Outpatient Medications on File Prior to Encounter   Medication Sig    albuterol (VENTOLIN HFA) 90 mcg/actuation inhaler Inhale 2 puffs into the lungs every 6 (six) hours as needed for  "Wheezing. Rescue    allopurinol (ZYLOPRIM) 100 MG tablet TAKE 1 TABLET (100 MG TOTAL) BY MOUTH ONCE DAILY.    aspirin (ECOTRIN) 81 MG EC tablet Take 81 mg by mouth every morning.     BD INSULIN SYRINGE ULTRA-FINE 0.5 mL 31 gauge x 5/16 Syrg USE AS DIRECTED TO INJECT INSULIN TWO TIMES DAILY    bimatoprost (LUMIGAN) 0.03 % ophthalmic drops Place 1 drop into the left eye every evening.    blood sugar diagnostic Strp 1 strip by Misc.(Non-Drug; Combo Route) route 4 (four) times daily. [employer]    cholecalciferol, vitamin D3, (VITAMIN D3) 1,000 unit capsule Take 1,000 Units by mouth once daily.    insulin glargine (LANTUS) 100 unit/mL injection 50 units bid prn when BS< 150    IRON/VITAMIN B COMPLEX (GERITOL ORAL) Take by mouth.    lancets Misc For tel care    lisinopril (PRINIVIL,ZESTRIL) 5 MG tablet TAKE 1 TABLET (5 MG TOTAL) BY MOUTH ONCE DAILY. (Patient taking differently: Take 5 mg by mouth every morning. )    nozaseptin (NOZIN) nasal  1 each by Each Nare route 2 (two) times daily.    pantoprazole (PROTONIX) 40 MG tablet TAKE 1 TABLET BY MOUTH EVERY DAY FOR ACID REFLUX    pen needle, diabetic (BD ULTRA-FINE JOSLYN PEN NEEDLE) 32 gauge x 5/32" Ndle 1 each by Misc.(Non-Drug; Combo Route) route 4 (four) times daily.    simvastatin (ZOCOR) 10 MG tablet TAKE 1 TABLET (10 MG TOTAL) BY MOUTH EVERY EVENING.    bumetanide (BUMEX) 2 MG tablet MWF take 1 whole tab and on Tues, Thurs, Sat, Sun  Take 1/2 tab    [DISCONTINUED] diphenoxylate-atropine 2.5-0.025 mg (LOMOTIL) 2.5-0.025 mg per tablet Take 1 tablet by mouth 3 (three) times daily.     Family History     Problem Relation (Age of Onset)    Cancer Mother    Heart disease Father    No Known Problems Sister, Brother, Maternal Aunt, Maternal Uncle, Paternal Aunt, Paternal Uncle, Maternal Grandmother, Maternal Grandfather, Paternal Grandmother, Paternal Grandfather    Stroke Father        Tobacco Use    Smoking status: Never Smoker    Smokeless tobacco: " Never Used   Substance and Sexual Activity    Alcohol use: No     Alcohol/week: 0.0 oz     Frequency: Never    Drug use: No    Sexual activity: Not Currently     Review of Systems   Constitutional: Positive for activity change, fatigue and unexpected weight change. Negative for appetite change, chills, diaphoresis and fever.   HENT: Negative for congestion, postnasal drip, rhinorrhea, sore throat and trouble swallowing.    Eyes: Negative for pain and discharge.   Respiratory: Positive for shortness of breath. Negative for cough, choking, chest tightness and wheezing.    Cardiovascular: Positive for leg swelling. Negative for chest pain and palpitations.   Gastrointestinal: Negative for abdominal distention, abdominal pain, constipation, diarrhea, nausea and vomiting.   Endocrine: Negative for cold intolerance and heat intolerance.   Genitourinary: Negative for difficulty urinating, dysuria and hematuria.   Musculoskeletal: Negative for arthralgias, gait problem and myalgias.   Allergic/Immunologic: Negative.    Neurological: Negative for dizziness, tremors, seizures, syncope, facial asymmetry, speech difficulty, weakness, light-headedness, numbness and headaches.   Hematological: Negative.    Psychiatric/Behavioral: Negative for agitation, behavioral problems and confusion. The patient is not nervous/anxious.      Objective:     Vital Signs (Most Recent):  Temp: 97.9 °F (36.6 °C) (04/02/19 1054)  Pulse: 95 (04/02/19 1445)  Resp: 20 (04/02/19 1445)  BP: 125/71 (04/02/19 1445)  SpO2: 100 % (04/02/19 1402) Vital Signs (24h Range):  Temp:  [97.9 °F (36.6 °C)] 97.9 °F (36.6 °C)  Pulse:  [] 95  Resp:  [20-22] 20  SpO2:  [98 %-100 %] 100 %  BP: (116-127)/(71-87) 125/71     Weight: 84.4 kg (186 lb 1.1 oz)  Body mass index is 28.29 kg/m².    Physical Exam   Constitutional: He is oriented to person, place, and time. He appears well-developed and well-nourished.   HENT:   Head: Normocephalic and atraumatic.   Nose:  Nose normal.   Eyes: Conjunctivae and EOM are normal. No scleral icterus.   Neck: Normal range of motion. Neck supple. JVD present.   Cardiovascular: Normal rate, regular rhythm, normal heart sounds and intact distal pulses. Exam reveals no gallop and no friction rub.   No murmur heard.  Pulmonary/Chest: He is in respiratory distress. He has rales.   Abdominal: Soft. Bowel sounds are normal. He exhibits no distension and no mass. There is no tenderness.   Genitourinary:   Genitourinary Comments: deferred   Musculoskeletal: Normal range of motion. He exhibits edema.   Neurological: He is alert and oriented to person, place, and time. No cranial nerve deficit.   Skin: Skin is warm. Capillary refill takes less than 2 seconds.   Psychiatric: He has a normal mood and affect. His behavior is normal.   Nursing note and vitals reviewed.        CRANIAL NERVES     CN III, IV, VI   Extraocular motions are normal.        Significant Labs:   Recent Lab Results       04/02/19  1319   04/02/19  1215        Albumin   3.3     Alkaline Phosphatase   109     ALT   21     Anion Gap   10     Appearance, UA Clear       AST   41     Baso #   0.01     Basophil%   0.2     Bilirubin (UA) Negative       Total Bilirubin   0.8  Comment:  For infants and newborns, interpretation of results should be based  on gestational age, weight and in agreement with clinical  observations.  Premature Infant recommended reference ranges:  Up to 24 hours.............<8.0 mg/dL  Up to 48 hours............<12.0 mg/dL  3-5 days..................<15.0 mg/dL  6-29 days.................<15.0 mg/dL       BNP   2,054  Comment:  Values of less than 100 pg/ml are consistent with non-CHF populations.     BUN, Bld   23     Calcium   9.4     Chloride   101     CO2   23     Color, UA Yellow       Creatinine   2.1     Differential Method   Automated     eGFR if    37     eGFR if non    32  Comment:  Calculation used to obtain the estimated  glomerular filtration  rate (eGFR) is the CKD-EPI equation.        Eos #   0.1     Eosinophil%   2.3     Glucose   94     Glucose, UA Negative       Gran # (ANC)   3.1     Gran%   55.0     Hematocrit   39.4     Hemoglobin   12.9     Coumadin Monitoring INR   1.2  Comment:  Coumadin Therapy:  2.0 - 3.0 for INR for all indicators except mechanical heart valves  and antiphospholipid syndromes which should use 2.5 - 3.5.       Ketones, UA Negative       Leukocytes, UA Negative       Lymph #   1.6     Lymph%   28.8     MCH   28.5     MCHC   32.7     MCV   87     Mono #   0.8     Mono%   13.7     MPV   11.4     Nitrite, UA Negative       Occult Blood UA Negative       Ovalocytes   Occasional     pH, UA 7.0       Platelets   193     Poik   Slight     Poly   Occasional     Potassium   4.6     Total Protein   7.7     Protein, UA Negative  Comment:  Recommend a 24 hour urine protein or a urine   protein/creatinine ratio if globulin induced proteinuria is  clinically suspected.         Protime   13.0     RBC   4.52     RDW   16.9     Sodium   134     Specific Gravity, UA 1.010       Specimen UA Urine, Clean Catch       Spherocytes   Occasional     Stomatocytes   Present     Target Cells   Occasional     Tear Drop Cells   Occasional     Troponin I   0.357  Comment:  The reference interval for Troponin I represents the 99th percentile   cutoff   for our facility and is consistent with 3rd generation assay   performance.       Urobilinogen, UA Negative       WBC   5.91         All pertinent labs within the past 24 hours have been reviewed.    Significant Imaging: I have reviewed all pertinent imaging results/findings within the past 24 hours.   Imaging Results          X-Ray Chest AP Portable (Final result)  Result time 04/02/19 12:30:40    Final result by Umair Rodriguez MD (04/02/19 12:30:40)                 Impression:      Chronic cardiomegaly.  No acute findings.      Electronically signed by: Lonnie Rodriguez  MD  Date:    04/02/2019  Time:    12:30             Narrative:    EXAMINATION:  XR CHEST AP PORTABLE    CLINICAL HISTORY:  CHF;    TECHNIQUE:  Single frontal view of the chest was performed.    COMPARISON:  03/26/2019    FINDINGS:  There is chronic moderately severe cardiomegaly appear a pacemaker remains in place by a left-sided approach.  Chronic scarring is present in the lingula.  The lungs appear free of any active infiltrate or effusion.

## 2019-04-02 NOTE — TELEPHONE ENCOUNTER
"I spoke with pts wife and provider Jovana PATEL. Pt does not want to "go in circles with this" symptoms not really improved from last week.  Pt will go to ER for evaluation  "

## 2019-04-02 NOTE — TELEPHONE ENCOUNTER
Please call patient. Labs reviewed. BNP essentially unchanged. Creatinine bumped to 2.3 with diuresis.    Morena, please have him seen in clinic today by Donita to re-evaluate his symptoms

## 2019-04-02 NOTE — H&P
[facility]  History & Physical    Patient Name: Yan Choudhury  MRN: [id number]  Admission Date: 4/2/2019  Attending Physician: Ney Og,*   Primary Care Provider: Sandra Estevez MD         Patient information was obtained from patient, spouse/SO, past medical records and ER records.     Subjective:     Principal Problem:SOB (shortness of breath)    Chief Complaint:   Chief Complaint   Patient presents with    Shortness of Breath     pt c/o SOB and BLE swelling, hx of CHF        HPI: 62 y/o male with PMHx of CHF, CAD, HTN, HLD, and CKD 3 who presented to the ED with c/o SOB that onset gradually over the past 3 weeks and worsened in the past 24 hours. Associated symptoms include BLE edema, weight gain, and fatigue. Patient took his bumex but did not take his metolazone. Patient reports he follows a cardiac diet but does not weigh himself daily. Symptoms are constant and moderate in severity. No mitigating or exacerbating factors reported. Patient denies fever, chills, N/V/D, chest pain, palpitations, diaphoresis, cough, congestion, and all other symptoms at this time. [facility] sees [facility] and last appointment was 3/26/19. He sees [facility] and last appt was 3/28/19. ED workup shows: BNP 2054, troponin 0.357. Cardiology consulted by ED. He is a full code and his surrogate decision maker is his wife, Jessica.    Past Medical History:   Diagnosis Date    Arthritis     CAD (coronary artery disease)     Cataract     Chronic combined systolic and diastolic congestive heart failure 3/24/2015    CKD (chronic kidney disease), stage III     Diabetes mellitus type II      AM    Diabetic retinopathy     anti Veg F injections for macular edema    Diverticulitis of colon     ED (erectile dysfunction)     Encounter for blood transfusion     Glaucoma     HTN (hypertension)     Hyperlipidemia     Ischemic cardiomyopathy     Obesity     JAHAIRA on CPAP      Pacemaker     Pulmonary HTN        Past Surgical History:   Procedure Laterality Date    CARDIAC CATHETERIZATION  2009    CHOLECYSTECTOMY      CLOSURE, ILEOSTOMY N/A 1/22/2019    [facility]    COLECTOMY-SIGMOID N/A 4/22/2016    [facility]    COLON SURGERY      Sigmoid resection    COLONOSCOPY N/A 10/11/2018    [facility] ENDO    COLOSTOMY N/A 4/22/2016    [facility]    CREATION, ILEOSTOMY N/A 10/30/2018    [facility]    EYE SURGERY      HEMICOLECTOMY, RIGHT Right 10/30/2018    [facility]    Ileostomy reversal  01/2019    KNEE SURGERY      MOBILIZATION, SPLENIC FLEXURE N/A 10/30/2018    [facility] OR    REVISION OR CLOSURE, COLOSTOMY N/A 10/30/2018    [facility] OR    SIGMOIDOSCOPY, FLEXIBLE N/A 1/18/2019    [facility]formed [facility] ENDO       Review of patient's allergies indicates:  No Known Allergies    No current facility-administered medications on file prior to encounter.      Current Outpatient Medications on File Prior to Encounter   Medication Sig    albuterol (VENTOLIN HFA) 90 mcg/actuation inhaler Inhale 2 puffs into the lungs every 6 (six) hours as needed for Wheezing. Rescue    allopurinol (ZYLOPRIM) 100 MG tablet TAKE 1 TABLET (100 MG TOTAL) BY MOUTH ONCE DAILY.    aspirin (ECOTRIN) 81 MG EC tablet Take 81 mg by mouth every morning.     BD INSULIN SYRINGE ULTRA-FINE 0.5 mL 31 gauge x 5/16 Syrg USE AS DIRECTED TO INJECT INSULIN TWO TIMES DAILY    bimatoprost (LUMIGAN) 0.03 % ophthalmic drops Place 1 drop into the left eye every evening.    blood sugar diagnostic Strp 1 strip by Misc.(Non-Drug; Combo Route) route 4 (four) times daily. [employer]    cholecalciferol, vitamin D3, (VITAMIN D3) 1,000 unit  "capsule Take 1,000 Units by mouth once daily.    insulin glargine (LANTUS) 100 unit/mL injection 50 units bid prn when BS< 150    IRON/VITAMIN B COMPLEX (GERITOL ORAL) Take by mouth.    lancets Misc For tel care    lisinopril (PRINIVIL,ZESTRIL) 5 MG tablet TAKE 1 TABLET (5 MG TOTAL) BY MOUTH ONCE DAILY. (Patient taking differently: Take 5 mg by mouth every morning. )    nozaseptin (NOZIN) nasal  1 each by Each Nare route 2 (two) times daily.    pantoprazole (PROTONIX) 40 MG tablet TAKE 1 TABLET BY MOUTH EVERY DAY FOR ACID REFLUX    pen needle, diabetic (BD ULTRA-FINE JOSLYN PEN NEEDLE) 32 gauge x 5/32" Ndle 1 each by Misc.(Non-Drug; Combo Route) route 4 (four) times daily.    simvastatin (ZOCOR) 10 MG tablet TAKE 1 TABLET (10 MG TOTAL) BY MOUTH EVERY EVENING.    bumetanide (BUMEX) 2 MG tablet MWF take 1 whole tab and on Tues, Thurs, Sat, Sun  Take 1/2 tab    [DISCONTINUED] diphenoxylate-atropine 2.5-0.025 mg (LOMOTIL) 2.5-0.025 mg per tablet Take 1 tablet by mouth 3 (three) times daily.     Family History     Problem Relation (Age of Onset)    Cancer Mother    Heart disease Father    No Known Problems Sister, Brother, Maternal Aunt, Maternal Uncle, Paternal Aunt, Paternal Uncle, Maternal Grandmother, Maternal Grandfather, Paternal Grandmother, Paternal Grandfather    Stroke Father        Tobacco Use    Smoking status: Never Smoker    Smokeless tobacco: Never Used   Substance and Sexual Activity    Alcohol use: No     Alcohol/week: 0.0 oz     Frequency: Never    Drug use: No    Sexual activity: Not Currently     Review of Systems   Constitutional: Positive for activity change, fatigue and unexpected weight change. Negative for appetite change, chills, diaphoresis and fever.   HENT: Negative for congestion, postnasal drip, rhinorrhea, sore throat and trouble swallowing.    Eyes: Negative for pain and discharge.   Respiratory: Positive for shortness of breath. Negative for cough, choking, chest " tightness and wheezing.    Cardiovascular: Positive for leg swelling. Negative for chest pain and palpitations.   Gastrointestinal: Negative for abdominal distention, abdominal pain, constipation, diarrhea, nausea and vomiting.   Endocrine: Negative for cold intolerance and heat intolerance.   Genitourinary: Negative for difficulty urinating, dysuria and hematuria.   Musculoskeletal: Negative for arthralgias, gait problem and myalgias.   Allergic/Immunologic: Negative.    Neurological: Negative for dizziness, tremors, seizures, syncope, facial asymmetry, speech difficulty, weakness, light-headedness, numbness and headaches.   Hematological: Negative.    Psychiatric/Behavioral: Negative for agitation, behavioral problems and confusion. The patient is not nervous/anxious.      Objective:     Vital Signs (Most Recent):  Temp: 97.9 °F (36.6 °C) (04/02/19 1054)  Pulse: 95 (04/02/19 1445)  Resp: 20 (04/02/19 1445)  BP: 125/71 (04/02/19 1445)  SpO2: 100 % (04/02/19 1402) Vital Signs (24h Range):  Temp:  [97.9 °F (36.6 °C)] 97.9 °F (36.6 °C)  Pulse:  [] 95  Resp:  [20-22] 20  SpO2:  [98 %-100 %] 100 %  BP: (116-127)/(71-87) 125/71     Weight: 84.4 kg (186 lb 1.1 oz)  Body mass index is 28.29 kg/m².    Physical Exam   Constitutional: He is oriented to person, place, and time. He appears well-developed and well-nourished.   HENT:   Head: Normocephalic and atraumatic.   Nose: Nose normal.   Eyes: Conjunctivae and EOM are normal. No scleral icterus.   Neck: Normal range of motion. Neck supple. JVD present.   Cardiovascular: Normal rate, regular rhythm, normal heart sounds and intact distal pulses. Exam reveals no gallop and no friction rub.   No murmur heard.  Pulmonary/Chest: He is in respiratory distress. He has rales.   Abdominal: Soft. Bowel sounds are normal. He exhibits no distension and no mass. There is no tenderness.   Genitourinary:   Genitourinary Comments: deferred   Musculoskeletal: Normal range of motion. He  exhibits edema.   Neurological: He is alert and oriented to person, place, and time. No cranial nerve deficit.   Skin: Skin is warm. Capillary refill takes less than 2 seconds.   Psychiatric: He has a normal mood and affect. His behavior is normal.   Nursing note and vitals reviewed.        CRANIAL NERVES     CN III, IV, VI   Extraocular motions are normal.        Significant Labs:   Recent Lab Results       04/02/19  1319   04/02/19  1215        Albumin   3.3     Alkaline Phosphatase   109     ALT   21     Anion Gap   10     Appearance, UA Clear       AST   41     Baso #   0.01     Basophil%   0.2     Bilirubin (UA) Negative       Total Bilirubin   0.8  Comment:  For infants and newborns, interpretation of results should be based  on gestational age, weight and in agreement with clinical  observations.  Premature Infant recommended reference ranges:  Up to 24 hours.............<8.0 mg/dL  Up to 48 hours............<12.0 mg/dL  3-5 days..................<15.0 mg/dL  6-29 days.................<15.0 mg/dL       BNP   2,054  Comment:  Values of less than 100 pg/ml are consistent with non-CHF populations.     BUN, Bld   23     Calcium   9.4     Chloride   101     CO2   23     Color, UA Yellow       Creatinine   2.1     Differential Method   Automated     eGFR if    37     eGFR if non    32  Comment:  Calculation used to obtain the estimated glomerular filtration  rate (eGFR) is the CKD-EPI equation.        Eos #   0.1     Eosinophil%   2.3     Glucose   94     Glucose, UA Negative       Gran # (ANC)   3.1     Gran%   55.0     Hematocrit   39.4     Hemoglobin   12.9     Coumadin Monitoring INR   1.2  Comment:  Coumadin Therapy:  2.0 - 3.0 for INR for all indicators except mechanical heart valves  and antiphospholipid syndromes which should use 2.5 - 3.5.       Ketones, UA Negative       Leukocytes, UA Negative       Lymph #   1.6     Lymph%   28.8     MCH   28.5     MCHC   32.7     MCV    87     Mono #   0.8     Mono%   13.7     MPV   11.4     Nitrite, UA Negative       Occult Blood UA Negative       Ovalocytes   Occasional     pH, UA 7.0       Platelets   193     Poik   Slight     Poly   Occasional     Potassium   4.6     Total Protein   7.7     Protein, UA Negative  Comment:  Recommend a 24 hour urine protein or a urine   protein/creatinine ratio if globulin induced proteinuria is  clinically suspected.         Protime   13.0     RBC   4.52     RDW   16.9     Sodium   134     Specific Gravity, UA 1.010       Specimen UA Urine, Clean Catch       Spherocytes   Occasional     Stomatocytes   Present     Target Cells   Occasional     Tear Drop Cells   Occasional     Troponin I   0.357  Comment:  The reference interval for Troponin I represents the 99th percentile   cutoff   for our facility and is consistent with 3rd generation assay   performance.       Urobilinogen, UA Negative       WBC   5.91         All pertinent labs within the past 24 hours have been reviewed.    Significant Imaging: I have reviewed all pertinent imaging results/findings within the past 24 hours.   Imaging Results          X-Ray Chest AP Portable (Final result)  Result time 04/02/19 12:30:40    Final result by Umair Rodriguez MD (04/02/19 12:30:40)                 Impression:      Chronic cardiomegaly.  No acute findings.      Electronically signed by: Lonnie Rodriguez MD  Date:    04/02/2019  Time:    12:30             Narrative:    EXAMINATION:  XR CHEST AP PORTABLE    CLINICAL HISTORY:  CHF;    TECHNIQUE:  Single frontal view of the chest was performed.    COMPARISON:  03/26/2019    FINDINGS:  There is chronic moderately severe cardiomegaly appear a pacemaker remains in place by a left-sided approach.  Chronic scarring is present in the lingula.  The lungs appear free of any active infiltrate or effusion.                                  Assessment/Plan:     * SOB (shortness of breath)  --likely 2/2 fluid  overload  --aggressive diuresis  --strict I&O  --daily weights  --cardiac diet  --supplemental oxygen to maintain sats >92%        Elevated troponin  --1st trop 0.357  --likely demand ischemia 2/2 fluid overload  --pt denies chest pain  --aggressive diuresis  --cardiology following  --trend troponins      CKD (chronic kidney disease) stage 3, GFR 30-59 ml/min  --held lisinopril 2/2 brooke  --avoid nephrotoxic agents  --will continue OP follow up       Chronic combined systolic and diastolic congestive heart failure  --BNP 2054  --IV furosemide given in ED  --metolazone and bumex given 1 hour apart  --strict I&O  --daily weights  --cardiology consulted       Hyponatremia  --likely 2/2 fluid overload  --aggressive diuresis  --monitor BMP      BROOKE (acute kidney injury)  --Cr. 2.1, baseline 1.6, eGFR 37, baseline: 51  --hold lisinopril for now, will restart once back to baseline  --likely 2/2 fluid overload  --avoid nephrotoxic agents  --aggressive diuresis  --monitor       Gastroesophageal reflux disease  --continue pantoprazole      Type 2 diabetes mellitus with kidney complication, with long-term current use of insulin  --accuchecks and SSI  --diabetic diet  --HA1C pending      Anemia due to stage 3 chronic kidney disease  --H/H 12.9/39.4  --monitor and transfuse if indicated      Biventricular ICD (implantable cardioverter-defibrillator) in place  --cardiology consulted  --pt reports unit has not fired to his knowledge  --will f/u OP      CAD, multiple vessel  --continue daily ASA, statin  --cardiac diet      Hyperlipidemia associated with type 2 diabetes mellitus  --continue simvastatin  --cardiac diet      Hypertension associated with diabetes  --hold lisinopril 2/2 BROOKE  --PRN hydralazine  --cardiac diet        VTE Risk Mitigation (From admission, onward)        Ordered     IP VTE HIGH RISK PATIENT  Once      04/02/19 1502     Place sequential compression device  Until discontinued      04/02/19 1502     Place CARYN  hose  Until discontinued      04/02/19 1502             Neris Hammer, MICHAELP-C  Department of Hospital Medicine   [facility]

## 2019-04-02 NOTE — HPI
64 y/o male with PMHx of CHF, CAD, HTN, HLD, and CKD 3 who presented to the ED with c/o SOB that onset gradually over the past 3 weeks and worsened in the past 24 hours. Associated symptoms include BLE edema, weight gain, and fatigue. Patient took his bumex but did not take his metolazone. Patient reports he follows a cardiac diet but does not weigh himself daily. Symptoms are constant and moderate in severity. No mitigating or exacerbating factors reported. Patient denies fever, chills, N/V/D, chest pain, palpitations, diaphoresis, cough, congestion, and all other symptoms at this time. He sees [facility] and last appointment was 3/26/19. He sees [facility] and last appt was 3/28/19. ED workup shows: BNP 2054, troponin 0.357. Cardiology consulted by ED. He is a full code and his surrogate decision maker is his wife, Shalonda

## 2019-04-02 NOTE — PLAN OF CARE
04/02/19 1501   DINERO Message   Medicare Outpatient and Observation Notification regarding financial responsibility Given to patient/caregiver;Explained to patient/caregiver;Signed/date by patient/caregiver   Date DINERO was signed 04/02/19   Time DINERO was signed 150

## 2019-04-02 NOTE — CONSULTS
[facility]  Cardiology  Consult Note    Patient Name: Yan Choudhury  MRN: [id number]  Admission Date: 4/2/2019  Hospital Length of Stay: 0 days  Code Status: Full Code   Attending Provider: Sy Galvin MD   Consulting Provider: Jovana Woodard PA-C  Primary Care Physician: Sandra Estevez MD  Principal Problem:SOB (shortness of breath)    Patient information was obtained from patient, past medical records and ER records.     Inpatient consult to Cardiology  Consult performed by: Jovana Woodard PA-C  Consult ordered by: Ney Og MD        Subjective:     Chief Complaint:  Shortness of breath    HPI:   Mr. Choudhury is a 63 year old male patient whose current medical conditions include CAD/ICM, DCM, LBBB, HTN, CRI, DM s/p CRT-ICD, PHTN, dyslipidemia, obesity, and s/p colostomy reversal/parastomal repair and ileostomy placement and reversal who presented to [facility] ED today with a chief complaint of worsening SOB over the past 1-2 weeks. Associated symptoms included weight gain, orthopnea, and bilateral lower extremity edema. He denied any associated davion chest pain, lightheadedness, dizziness, near syncope, or syncope. Initial workup in ED revealed troponin of 0.357, BNP> 2,000, and creatinine of 2.1. Patient subsequently admitted for further evaluation and treatment. Cardiology consulted to assist with management. Patient seen and examined today while in ED. He is well known to cardiology service. Seen last week in clinic and had Zaroxolyn added and po diuretics increased without much improvement in symptoms. Still complains of SOB and LE edema at time of exam. Remains chest pain free. No other complaints. He reports compliance with his medications. Variable  Compliance with diet. Chart reviewed. Repeat troponin pending. 2D echo 10/18 showed EF of 30-35%, DD. EKG reviewed and showed no acute ischemic changes.       Past Medical History:   Diagnosis Date    Arthritis     CAD  (coronary artery disease)     Cataract     Chronic combined systolic and diastolic congestive heart failure 3/24/2015    CKD (chronic kidney disease), stage III     Diabetes mellitus type II      AM    Diabetic retinopathy     anti Veg F injections for macular edema    Diverticulitis of colon     ED (erectile dysfunction)     Encounter for blood transfusion     Glaucoma     HTN (hypertension)     Hyperlipidemia     Ischemic cardiomyopathy     Obesity     JAHAIRA on CPAP     Pacemaker     Pulmonary HTN        Past Surgical History:   Procedure Laterality Date    CARDIAC CATHETERIZATION  2009    CHOLECYSTECTOMY      CLOSURE, ILEOSTOMY N/A 1/22/2019    [facility]    COLECTOMY-SIGMOID N/A 4/22/2016    [facility]    COLON SURGERY      Sigmoid resection    COLONOSCOPY N/A 10/11/2018    Performed by Quinn [facility]agon [facility] at [facility] ENDO    COLOSTOMY N/A 4/22/2016    [facility]    CREATION, ILEOSTOMY N/A 10/30/2018    [facility]    EYE SURGERY      HEMICOLECTOMY, RIGHT Right 10/30/2018    [facility]    Ileostomy reversal  01/2019    KNEE SURGERY      MOBILIZATION, SPLENIC FLEXURE N/A 10/30/2018    [facility] by Kevni Lindsay [facility] at [facility] OR    REVISION OR CLOSURE, COLOSTOMY N/A 10/30/2018    [facility] at [facility] OR    SIGMOIDOSCOPY, FLEXIBLE N/A 1/18/2019    Performed by Quinn [facility]agon [facility] at [facility] ENDO       Review of patient's allergies indicates:  No Known Allergies    No current facility-administered medications on file prior to encounter.      Current Outpatient Medications on File Prior to Encounter   Medication Sig    albuterol (VENTOLIN HFA) 90 mcg/actuation inhaler Inhale 2 puffs into the lungs every 6 (six) hours as needed for Wheezing. Rescue    allopurinol (ZYLOPRIM) 100 MG tablet TAKE 1  "TABLET (100 MG TOTAL) BY MOUTH ONCE DAILY.    aspirin (ECOTRIN) 81 MG EC tablet Take 81 mg by mouth every morning.     BD INSULIN SYRINGE ULTRA-FINE 0.5 mL 31 gauge x 5/16 Syrg USE AS DIRECTED TO INJECT INSULIN TWO TIMES DAILY    bimatoprost (LUMIGAN) 0.03 % ophthalmic drops Place 1 drop into the left eye every evening.    blood sugar diagnostic Strp 1 strip by Misc.(Non-Drug; Combo Route) route 4 (four) times daily. [employer]care    cholecalciferol, vitamin D3, (VITAMIN D3) 1,000 unit capsule Take 1,000 Units by mouth once daily.    insulin glargine (LANTUS) 100 unit/mL injection 50 units bid prn when BS< 150    IRON/VITAMIN B COMPLEX (GERITOL ORAL) Take by mouth.    lancets Misc For tel care    lisinopril (PRINIVIL,ZESTRIL) 5 MG tablet TAKE 1 TABLET (5 MG TOTAL) BY MOUTH ONCE DAILY. (Patient taking differently: Take 5 mg by mouth every morning. )    nozaseptin (NOZIN) nasal  1 each by Each Nare route 2 (two) times daily.    pantoprazole (PROTONIX) 40 MG tablet TAKE 1 TABLET BY MOUTH EVERY DAY FOR ACID REFLUX    pen needle, diabetic (BD ULTRA-FINE JOSLYN PEN NEEDLE) 32 gauge x 5/32" Ndle 1 each by Misc.(Non-Drug; Combo Route) route 4 (four) times daily.    simvastatin (ZOCOR) 10 MG tablet TAKE 1 TABLET (10 MG TOTAL) BY MOUTH EVERY EVENING.    bumetanide (BUMEX) 2 MG tablet MWF take 1 whole tab and on Tues, Thurs, Sat, Sun  Take 1/2 tab    [DISCONTINUED] diphenoxylate-atropine 2.5-0.025 mg (LOMOTIL) 2.5-0.025 mg per tablet Take 1 tablet by mouth 3 (three) times daily.     Family History     Problem Relation (Age of Onset)    Cancer Mother    Heart disease Father    No Known Problems Sister, Brother, Maternal Aunt, Maternal Uncle, Paternal Aunt, Paternal Uncle, Maternal Grandmother, Maternal Grandfather, Paternal Grandmother, Paternal Grandfather    Stroke Father        Tobacco Use    Smoking status: Never Smoker    Smokeless tobacco: Never Used   Substance and Sexual Activity    Alcohol use: No    "  Alcohol/week: 0.0 oz     Frequency: Never    Drug use: No    Sexual activity: Not Currently     Review of Systems   Constitution: Negative.   HENT: Negative.    Eyes: Negative.    Cardiovascular: Positive for dyspnea on exertion, leg swelling and orthopnea.   Respiratory: Positive for shortness of breath.    Endocrine: Negative.    Hematologic/Lymphatic: Negative.    Skin: Negative.    Musculoskeletal: Negative.    Gastrointestinal: Negative.    Genitourinary: Negative.    Neurological: Negative.    Psychiatric/Behavioral: Negative.    Allergic/Immunologic: Negative.      Objective:     Vital Signs (Most Recent):  Temp: 97.4 °F (36.3 °C) (04/02/19 1534)  Pulse: 101 (04/02/19 1534)  Resp: 18 (04/02/19 1534)  BP: 120/85 (04/02/19 1534)  SpO2: 99 % (04/02/19 1534) Vital Signs (24h Range):  Temp:  [97.4 °F (36.3 °C)-98 °F (36.7 °C)] 97.4 °F (36.3 °C)  Pulse:  [] 101  Resp:  [18-22] 18  SpO2:  [98 %-100 %] 99 %  BP: (116-127)/(71-87) 120/85     Weight: 82 kg (180 lb 12.4 oz)  Body mass index is 27.49 kg/m².    SpO2: 99 %  O2 Device (Oxygen Therapy): room air      Intake/Output Summary (Last 24 hours) at 4/2/2019 1602  Last data filed at 4/2/2019 1352  Gross per 24 hour   Intake --   Output 350 ml   Net -350 ml       Lines/Drains/Airways     Peripheral Intravenous Line                 Peripheral IV - Single Lumen 04/02/19 1210 Left Forearm less than 1 day                Physical Exam   Constitutional: He is oriented to person, place, and time. He appears well-developed and well-nourished. No distress.   HENT:   Head: Normocephalic and atraumatic.   Eyes: Pupils are equal, round, and reactive to light. Right eye exhibits no discharge. Left eye exhibits no discharge.   Neck: Neck supple. JVD present.   Cardiovascular: Normal rate, regular rhythm, S1 normal, S2 normal and normal heart sounds.   No murmur heard.  Pulmonary/Chest: Effort normal. No respiratory distress. He has no wheezes. He has rales (faint at  bases).   AICD site well-healed   Abdominal: Soft. He exhibits no distension.   Musculoskeletal: He exhibits edema (1+ BLE).   Neurological: He is alert and oriented to person, place, and time.   Skin: Skin is warm and dry. He is not diaphoretic. No erythema.   Psychiatric: He has a normal mood and affect. His behavior is normal. Thought content normal.   Nursing note and vitals reviewed.      Significant Labs:   CMP   Recent Labs   Lab 04/01/19  1223 04/02/19  1215   * 134*   K 4.8 4.6    101   CO2 23 23    94   BUN 24* 23   CREATININE 2.3* 2.1*   CALCIUM 9.5 9.4   PROT  --  7.7   ALBUMIN  --  3.3*   BILITOT  --  0.8   ALKPHOS  --  109   AST  --  41*   ALT  --  21   ANIONGAP 11 10   ESTGFRAFRICA 33* 37*   EGFRNONAA 29* 32*   , CBC   Recent Labs   Lab 04/02/19  1215   WBC 5.91   HGB 12.9*   HCT 39.4*      , Troponin   Recent Labs   Lab 04/02/19  1215   TROPONINI 0.357*    and All pertinent lab results from the last 24 hours have been reviewed.    Significant Imaging: Echocardiogram:   2D echo with color flow doppler:   Results for orders placed or performed in visit on 10/01/18   2D echo with color flow doppler   Result Value Ref Range    QEF 30 (A) 55 - 65    Diastolic Dysfunction Yes (A)     Aortic Valve Regurgitation MILD TO MODERATE (A)     Est. PA Systolic Pressure 35.49     Tricuspid Valve Regurgitation MILD    , EKG: Reviewed and X-Ray: CXR: X-Ray Chest 1 View (CXR): No results found for this visit on 04/02/19. and X-Ray Chest PA and Lateral (CXR): No results found for this visit on 04/02/19.    Assessment and Plan:   Patient who presents with SOB/decompensated combined CHF. Elevated troponin secondary to demand ischemia. Denies chest pain. Continue to trend. Continue IV diuresis.    * SOB (shortness of breath)  -Presents with SOB/decompensated combined CHF  -Continue IV diuresis  -ACEi held due to worsening creatinine  -BB previously d/c'd due to issues with hypotension; will  re-evaluate this admission  -Continue ASA, statin  -Repeat echo in AM    Elevated troponin  -Initial troponin 0.357  -Trend  -Secondary to demand ischemia from decompensated combined CHF and underlying CKD  -Continue ASA, statin  -ACEi held due to worsening creatinine  -Will add BB if BP can tolerate  -Repeat echo in AM    Chronic combined systolic and diastolic congestive heart failure  -Presents with volume overload  -Continue IV diuresis and assess response  -ACEi held due to worsening creatinine  -Re-evaluate BP to see if patient can tolerate BB    BROOKE (acute kidney injury)  -Suspect secondary to cardiorenal syndrome  -Monitor with diuresis  -May need nephrology consult    CAD, multiple vessel  -Stable  -Denies chest pain  -Troponin elevation likely secondary to demand ischemia from volume overload  -Trend troponin  -Repeat echo in AM  -Continue ASA, statin, ACEI  -Coreg previously d/c'd due to hypotension; will re-evaluate this admission    Hyperlipidemia associated with type 2 diabetes mellitus  -Continue statin        VTE Risk Mitigation (From admission, onward)        Ordered     IP VTE HIGH RISK PATIENT  Once      04/02/19 1502     Place sequential compression device  Until discontinued      04/02/19 1502     Place CARYN hose  Until discontinued      04/02/19 1502          Thank you for your consult. I will follow-up with patient. Please contact us if you have any additional questions.    Jovana Woodard PA-C  Cardiology   [facility]    Chart reviewed. Dr. Mercer examined patient and agrees with plan as outlined above.

## 2019-04-02 NOTE — ASSESSMENT & PLAN NOTE
-Stable  -Denies chest pain  -Troponin elevation likely secondary to demand ischemia from volume overload  -Trend troponin  -Repeat echo in AM  -Continue ASA, statin, ACEI  -Coreg previously d/c'd due to hypotension; will re-evaluate this admission

## 2019-04-03 DIAGNOSIS — I10 ESSENTIAL HYPERTENSION: ICD-10-CM

## 2019-04-03 PROBLEM — I50.43 ACUTE ON CHRONIC COMBINED SYSTOLIC AND DIASTOLIC CONGESTIVE HEART FAILURE: Status: ACTIVE | Noted: 2019-01-15

## 2019-04-03 LAB
ALBUMIN SERPL BCP-MCNC: 2.9 G/DL (ref 3.5–5.2)
ALP SERPL-CCNC: 94 U/L (ref 55–135)
ALT SERPL W/O P-5'-P-CCNC: 17 U/L (ref 10–44)
ANION GAP SERPL CALC-SCNC: 10 MMOL/L (ref 8–16)
AORTIC VALVE REGURGITATION: ABNORMAL
AST SERPL-CCNC: 28 U/L (ref 10–40)
BASOPHILS # BLD AUTO: 0.02 K/UL (ref 0–0.2)
BASOPHILS NFR BLD: 0.3 % (ref 0–1.9)
BILIRUB SERPL-MCNC: 0.9 MG/DL (ref 0.1–1)
BUN SERPL-MCNC: 26 MG/DL (ref 8–23)
CALCIUM SERPL-MCNC: 9.3 MG/DL (ref 8.7–10.5)
CHLORIDE SERPL-SCNC: 103 MMOL/L (ref 95–110)
CO2 SERPL-SCNC: 22 MMOL/L (ref 23–29)
CREAT SERPL-MCNC: 1.9 MG/DL (ref 0.5–1.4)
DIFFERENTIAL METHOD: ABNORMAL
EOSINOPHIL # BLD AUTO: 0.3 K/UL (ref 0–0.5)
EOSINOPHIL NFR BLD: 3.8 % (ref 0–8)
ERYTHROCYTE [DISTWIDTH] IN BLOOD BY AUTOMATED COUNT: 16.8 % (ref 11.5–14.5)
EST. GFR  (AFRICAN AMERICAN): 42 ML/MIN/1.73 M^2
EST. GFR  (NON AFRICAN AMERICAN): 36 ML/MIN/1.73 M^2
ESTIMATED AVG GLUCOSE: 103 MG/DL (ref 68–131)
ESTIMATED PA SYSTOLIC PRESSURE: 77.41
GLUCOSE SERPL-MCNC: 94 MG/DL (ref 70–110)
HBA1C MFR BLD HPLC: 5.2 % (ref 4–5.6)
HCT VFR BLD AUTO: 37.4 % (ref 40–54)
HGB BLD-MCNC: 12.1 G/DL (ref 14–18)
LYMPHOCYTES # BLD AUTO: 2.5 K/UL (ref 1–4.8)
LYMPHOCYTES NFR BLD: 39 % (ref 18–48)
MCH RBC QN AUTO: 28.4 PG (ref 27–31)
MCHC RBC AUTO-ENTMCNC: 32.4 G/DL (ref 32–36)
MCV RBC AUTO: 88 FL (ref 82–98)
MITRAL VALVE REGURGITATION: ABNORMAL
MONOCYTES # BLD AUTO: 1 K/UL (ref 0.3–1)
MONOCYTES NFR BLD: 16 % (ref 4–15)
NEUTROPHILS # BLD AUTO: 2.7 K/UL (ref 1.8–7.7)
NEUTROPHILS NFR BLD: 40.9 % (ref 38–73)
PLATELET # BLD AUTO: 213 K/UL (ref 150–350)
PMV BLD AUTO: 12 FL (ref 9.2–12.9)
POCT GLUCOSE: 107 MG/DL (ref 70–110)
POCT GLUCOSE: 124 MG/DL (ref 70–110)
POCT GLUCOSE: 137 MG/DL (ref 70–110)
POTASSIUM SERPL-SCNC: 4.4 MMOL/L (ref 3.5–5.1)
PROT SERPL-MCNC: 6.6 G/DL (ref 6–8.4)
RBC # BLD AUTO: 4.26 M/UL (ref 4.6–6.2)
RETIRED EF AND QEF - SEE NOTES: 15 (ref 55–65)
SODIUM SERPL-SCNC: 135 MMOL/L (ref 136–145)
TRICUSPID VALVE REGURGITATION: ABNORMAL
TROPONIN I SERPL DL<=0.01 NG/ML-MCNC: 0.25 NG/ML (ref 0–0.03)
WBC # BLD AUTO: 6.51 K/UL (ref 3.9–12.7)

## 2019-04-03 PROCEDURE — 85025 COMPLETE CBC W/AUTO DIFF WBC: CPT

## 2019-04-03 PROCEDURE — 21400001 HC TELEMETRY ROOM

## 2019-04-03 PROCEDURE — 99232 SBSQ HOSP IP/OBS MODERATE 35: CPT | Mod: ,,, | Performed by: INTERNAL MEDICINE

## 2019-04-03 PROCEDURE — 93306 2D ECHO WITH COLOR FLOW DOPPLER: ICD-10-PCS | Mod: 26,,, | Performed by: INTERNAL MEDICINE

## 2019-04-03 PROCEDURE — 93306 TTE W/DOPPLER COMPLETE: CPT

## 2019-04-03 PROCEDURE — 80053 COMPREHEN METABOLIC PANEL: CPT

## 2019-04-03 PROCEDURE — 25000003 PHARM REV CODE 250: Performed by: INTERNAL MEDICINE

## 2019-04-03 PROCEDURE — 84484 ASSAY OF TROPONIN QUANT: CPT

## 2019-04-03 PROCEDURE — 96376 TX/PRO/DX INJ SAME DRUG ADON: CPT

## 2019-04-03 PROCEDURE — 36415 COLL VENOUS BLD VENIPUNCTURE: CPT

## 2019-04-03 PROCEDURE — 99232 PR SUBSEQUENT HOSPITAL CARE,LEVL II: ICD-10-PCS | Mod: ,,, | Performed by: INTERNAL MEDICINE

## 2019-04-03 PROCEDURE — 63600175 PHARM REV CODE 636 W HCPCS: Performed by: NURSE PRACTITIONER

## 2019-04-03 PROCEDURE — 25000003 PHARM REV CODE 250: Performed by: NURSE PRACTITIONER

## 2019-04-03 PROCEDURE — 25000003 PHARM REV CODE 250: Performed by: EMERGENCY MEDICINE

## 2019-04-03 PROCEDURE — 93306 TTE W/DOPPLER COMPLETE: CPT | Mod: 26,,, | Performed by: INTERNAL MEDICINE

## 2019-04-03 RX ORDER — ASPIRIN 81 MG/1
81 TABLET ORAL DAILY
Status: DISCONTINUED | OUTPATIENT
Start: 2019-04-03 | End: 2019-04-04 | Stop reason: HOSPADM

## 2019-04-03 RX ORDER — FUROSEMIDE 10 MG/ML
60 INJECTION INTRAMUSCULAR; INTRAVENOUS ONCE
Status: COMPLETED | OUTPATIENT
Start: 2019-04-03 | End: 2019-04-03

## 2019-04-03 RX ORDER — METOLAZONE 5 MG/1
5 TABLET ORAL DAILY
Status: DISCONTINUED | OUTPATIENT
Start: 2019-04-03 | End: 2019-04-04 | Stop reason: HOSPADM

## 2019-04-03 RX ORDER — LISINOPRIL 5 MG/1
5 TABLET ORAL DAILY
Qty: 90 TABLET | Refills: 3 | Status: SHIPPED | OUTPATIENT
Start: 2019-04-03 | End: 2019-04-08 | Stop reason: ALTCHOICE

## 2019-04-03 RX ADMIN — BUMETANIDE 2 MG: 1 TABLET ORAL at 08:04

## 2019-04-03 RX ADMIN — FUROSEMIDE 60 MG: 10 INJECTION, SOLUTION INTRAMUSCULAR; INTRAVENOUS at 09:04

## 2019-04-03 RX ADMIN — ASPIRIN 81 MG: 81 TABLET, COATED ORAL at 08:04

## 2019-04-03 RX ADMIN — METOLAZONE 5 MG: 5 TABLET ORAL at 09:04

## 2019-04-03 RX ADMIN — SIMVASTATIN 10 MG: 5 TABLET, FILM COATED ORAL at 09:04

## 2019-04-03 RX ADMIN — PANTOPRAZOLE SODIUM 40 MG: 40 TABLET, DELAYED RELEASE ORAL at 08:04

## 2019-04-03 RX ADMIN — ALLOPURINOL 100 MG: 100 TABLET ORAL at 08:04

## 2019-04-03 NOTE — PROGRESS NOTES
[facility]  Cardiology  Progress Note    Patient Name: Yan Choudhury  MRN: [id number]  Admission Date: 4/2/2019  Hospital Length of Stay: 0 days  Code Status: Full Code   Attending Physician: Sy Galvin MD   Primary Care Physician: Sandra Estevez MD  Expected Discharge Date:   Principal Problem:SOB (shortness of breath)    Subjective:   HPI:  Mr. Choudhury is a 63 year old male patient whose current medical conditions include CAD/ICM, DCM, LBBB, HTN, CRI, DM s/p CRT-ICD, PHTN, dyslipidemia, obesity, and s/p colostomy reversal/parastomal repair and ileostomy placement and reversal who presented to [facility] ED today with a chief complaint of worsening SOB over the past 1-2 weeks. Associated symptoms included weight gain, orthopnea, and bilateral lower extremity edema. He denied any associated davion chest pain, lightheadedness, dizziness, near syncope, or syncope. Initial workup in ED revealed troponin of 0.357, BNP> 2,000, and creatinine of 2.1. Patient subsequently admitted for further evaluation and treatment. Cardiology consulted to assist with management. Patient seen and examined today while in ED. He is well known to cardiology service. Seen last week in clinic and had Zaroxolyn added and po diuretics increased without much improvement in symptoms. Still complains of SOB and LE edema at time of exam. Remains chest pain free. No other complaints. He reports compliance with his medications. Variable  Compliance with diet. Chart reviewed. Repeat troponin pending. 2D echo 10/18 showed EF of 30-35%, DD. EKG reviewed and showed no acute ischemic changes.     Hospital Course:   4/3/19-Patient seen and examined today. Feels better. SOB greatly improved. No chest pain. Labs reviewed. Creatinine stable, 1.9. Troponin trending down. Repeat 2D echo showed EF 15-20%, pulmonary HTN.        Review of Systems   Constitution: Negative.   HENT: Negative.    Eyes: Negative.    Cardiovascular: Positive for dyspnea on  exertion and leg swelling.   Respiratory: Positive for shortness of breath.    Endocrine: Negative.    Hematologic/Lymphatic: Negative.    Skin: Negative.    Musculoskeletal: Negative.    Gastrointestinal: Negative.    Genitourinary: Negative.    Neurological: Negative.    Psychiatric/Behavioral: Negative.    Allergic/Immunologic: Negative.      Objective:     Vital Signs (Most Recent):  Temp: 98.1 °F (36.7 °C) (04/03/19 0707)  Pulse: 80 (04/03/19 0707)  Resp: 18 (04/03/19 0707)  BP: 98/63 (04/03/19 0707)  SpO2: 100 % (04/03/19 0707) Vital Signs (24h Range):  Temp:  [97.4 °F (36.3 °C)-98.1 °F (36.7 °C)] 98.1 °F (36.7 °C)  Pulse:  [] 80  Resp:  [18-22] 18  SpO2:  [98 %-100 %] 100 %  BP: ()/(61-87) 98/63     Weight: 83 kg (182 lb 15.7 oz)  Body mass index is 27.82 kg/m².     SpO2: 100 %  O2 Device (Oxygen Therapy): room air      Intake/Output Summary (Last 24 hours) at 4/3/2019 1214  Last data filed at 4/2/2019 1352  Gross per 24 hour   Intake --   Output 350 ml   Net -350 ml       Lines/Drains/Airways     Peripheral Intravenous Line                 Peripheral IV - Single Lumen 04/02/19 1210 Left Forearm 1 day                Physical Exam   Constitutional: He is oriented to person, place, and time. He appears well-developed and well-nourished. No distress.   HENT:   Head: Normocephalic and atraumatic.   Eyes: Pupils are equal, round, and reactive to light. Right eye exhibits no discharge. Left eye exhibits no discharge.   Neck: Neck supple. JVD present.   Cardiovascular: Normal rate, regular rhythm, S1 normal, S2 normal and normal heart sounds.   No murmur heard.  Pulmonary/Chest: Effort normal and breath sounds normal. No respiratory distress. He has no wheezes. He has no rales.   Abdominal: Soft. He exhibits distension.   Musculoskeletal: He exhibits edema (trace BLE).   Neurological: He is alert and oriented to person, place, and time.   Skin: Skin is warm and dry. He is not diaphoretic. No erythema.    Psychiatric: He has a normal mood and affect. His behavior is normal. Thought content normal.   Nursing note and vitals reviewed.      Significant Labs:   CMP   Recent Labs   Lab 04/01/19  1223 04/02/19  1215 04/03/19  0412   * 134* 135*   K 4.8 4.6 4.4    101 103   CO2 23 23 22*    94 94   BUN 24* 23 26*   CREATININE 2.3* 2.1* 1.9*   CALCIUM 9.5 9.4 9.3   PROT  --  7.7 6.6   ALBUMIN  --  3.3* 2.9*   BILITOT  --  0.8 0.9   ALKPHOS  --  109 94   AST  --  41* 28   ALT  --  21 17   ANIONGAP 11 10 10   ESTGFRAFRICA 33* 37* 42*   EGFRNONAA 29* 32* 36*   , CBC   Recent Labs   Lab 04/02/19  1215 04/03/19  0412   WBC 5.91 6.51   HGB 12.9* 12.1*   HCT 39.4* 37.4*    213   , Troponin   Recent Labs   Lab 04/02/19  1215 04/02/19  1815 04/03/19  0003   TROPONINI 0.357* 0.268* 0.247*    and All pertinent lab results from the last 24 hours have been reviewed.    Significant Imaging: Echocardiogram:   2D echo with color flow doppler:   Results for orders placed or performed during the hospital encounter of 04/02/19   2D echo with color flow doppler   Result Value Ref Range    QEF 15 (A) 55 - 65    Mitral Valve Regurgitation MILD     Aortic Valve Regurgitation MODERATE (A)     Est. PA Systolic Pressure 77.41 (A)     Tricuspid Valve Regurgitation MILD TO MODERATE    , EKG: Reviewed and X-Ray: CXR: X-Ray Chest 1 View (CXR): No results found for this visit on 04/02/19. and X-Ray Chest PA and Lateral (CXR): No results found for this visit on 04/02/19.    Assessment and Plan:   Patient who presents with SOB/decompensated CHF. EF down to 15-20% on repeat echo. Continue IV diuresis. Consider ischemic evaluation with stress test (IP vs OP).    * SOB (shortness of breath)  -Presents with SOB/decompensated combined CHF  -Continue IV diuresis  -Continue ACEI  -BB previously d/c'd due to issues with hypotension; will re-evaluate this admission  -Continue ASA, statin  -Repeat echo in AM    4/3/19  -Clinically  improving  -Continue diuresis for additional day    Elevated troponin  -Initial troponin 0.357  -Trend  -Secondary to demand ischemia from decompensated combined CHF and underlying CKD  -Continue ASA, statin  -Continue ACEi  -Will add BB if BP can tolerate  -Repeat echo in AM    4/3/19  -Troponin 0.356>0.268>0.247  -Continue ASA, statin  -Unable to tolerate BB and ACEI given borderline low BP  -Echo showed EF of 15-20%, pulmonary HTN  -Consider ischemic evaluation with stress test (IP vs OP)    Chronic combined systolic and diastolic congestive heart failure  -Presents with volume overload  -Continue IV diuresis and assess response  -Continue ACEi   -Re-evaluate BP to see if patient can tolerate BB    4/3/19  -Clinically improving  -Continue IV diuresis for additional day  -ACEi and BB held given borderline BP  -2D echo showed EF of 15-20%, pulmonary HTN  -Can consider stress test for ischemic evaluation (IP vs OP)    BROOKE (acute kidney injury)  -Suspect secondary to cardiorenal syndrome  -Monitor with diuresis  -May need nephrology consult    4/3/19  -Cardiorenal syndrome  -Creatinine 1.9  -Monitor with diuresis    CAD, multiple vessel  -Stable  -Denies chest pain  -Troponin elevation likely secondary to demand ischemia from volume overload  -Trend troponin  -Repeat echo in AM  -Continue ASA, statin, ACEI  -Coreg previously d/c'd due to hypotension; will re-evaluate this admission    4/3/19  -Stable overnight  -Remains chest pain free  -Continue ASA, statin  -ACEi and BB held due to borderline BP  -Reassess in AM    Hyperlipidemia associated with type 2 diabetes mellitus  -Continue statin        VTE Risk Mitigation (From admission, onward)        Ordered     IP VTE HIGH RISK PATIENT  Once      04/02/19 1502     Place sequential compression device  Until discontinued      04/02/19 1502     Place CARYN hose  Until discontinued      04/02/19 1502          Jovana Woodard PA-C  Cardiology  [facility]    Chart reviewed. Dr. Mercer examined patient and agrees with plan as outlined above.

## 2019-04-03 NOTE — ASSESSMENT & PLAN NOTE
-Stable  -Denies chest pain  -Troponin elevation likely secondary to demand ischemia from volume overload  -Trend troponin  -Repeat echo in AM  -Continue ASA, statin, ACEI  -Coreg previously d/c'd due to hypotension; will re-evaluate this admission    4/3/19  -Stable overnight  -Remains chest pain free  -Continue ASA, statin  -ACEi and BB held due to borderline BP  -Reassess in AM

## 2019-04-03 NOTE — ASSESSMENT & PLAN NOTE
-Presents with SOB/decompensated combined CHF  -Continue IV diuresis  -Continue ACEI  -BB previously d/c'd due to issues with hypotension; will re-evaluate this admission  -Continue ASA, statin  -Repeat echo in AM    4/3/19  -Clinically improving  -Continue diuresis for additional day

## 2019-04-03 NOTE — ASSESSMENT & PLAN NOTE
--admit Cr 2.3, baseline 1.6. Admit eGFR 33, baseline 51  --likely 2/2 fluid overload  --improving but not at baseline  --held lisinopril 2/2 santy  --avoid nephrotoxic agents

## 2019-04-03 NOTE — SUBJECTIVE & OBJECTIVE
Review of Systems   Constitution: Negative.   HENT: Negative.    Eyes: Negative.    Cardiovascular: Positive for dyspnea on exertion and leg swelling.   Respiratory: Positive for shortness of breath.    Endocrine: Negative.    Hematologic/Lymphatic: Negative.    Skin: Negative.    Musculoskeletal: Negative.    Gastrointestinal: Negative.    Genitourinary: Negative.    Neurological: Negative.    Psychiatric/Behavioral: Negative.    Allergic/Immunologic: Negative.      Objective:     Vital Signs (Most Recent):  Temp: 98.1 °F (36.7 °C) (04/03/19 0707)  Pulse: 80 (04/03/19 0707)  Resp: 18 (04/03/19 0707)  BP: 98/63 (04/03/19 0707)  SpO2: 100 % (04/03/19 0707) Vital Signs (24h Range):  Temp:  [97.4 °F (36.3 °C)-98.1 °F (36.7 °C)] 98.1 °F (36.7 °C)  Pulse:  [] 80  Resp:  [18-22] 18  SpO2:  [98 %-100 %] 100 %  BP: ()/(61-87) 98/63     Weight: 83 kg (182 lb 15.7 oz)  Body mass index is 27.82 kg/m².     SpO2: 100 %  O2 Device (Oxygen Therapy): room air      Intake/Output Summary (Last 24 hours) at 4/3/2019 1214  Last data filed at 4/2/2019 1352  Gross per 24 hour   Intake --   Output 350 ml   Net -350 ml       Lines/Drains/Airways     Peripheral Intravenous Line                 Peripheral IV - Single Lumen 04/02/19 1210 Left Forearm 1 day                Physical Exam   Constitutional: He is oriented to person, place, and time. He appears well-developed and well-nourished. No distress.   HENT:   Head: Normocephalic and atraumatic.   Eyes: Pupils are equal, round, and reactive to light. Right eye exhibits no discharge. Left eye exhibits no discharge.   Neck: Neck supple. JVD present.   Cardiovascular: Normal rate, regular rhythm, S1 normal, S2 normal and normal heart sounds.   No murmur heard.  Pulmonary/Chest: Effort normal and breath sounds normal. No respiratory distress. He has no wheezes. He has no rales.   Abdominal: Soft. He exhibits distension.   Musculoskeletal: He exhibits edema (trace BLE).    Neurological: He is alert and oriented to person, place, and time.   Skin: Skin is warm and dry. He is not diaphoretic. No erythema.   Psychiatric: He has a normal mood and affect. His behavior is normal. Thought content normal.   Nursing note and vitals reviewed.      Significant Labs:   CMP   Recent Labs   Lab 04/01/19  1223 04/02/19  1215 04/03/19  0412   * 134* 135*   K 4.8 4.6 4.4    101 103   CO2 23 23 22*    94 94   BUN 24* 23 26*   CREATININE 2.3* 2.1* 1.9*   CALCIUM 9.5 9.4 9.3   PROT  --  7.7 6.6   ALBUMIN  --  3.3* 2.9*   BILITOT  --  0.8 0.9   ALKPHOS  --  109 94   AST  --  41* 28   ALT  --  21 17   ANIONGAP 11 10 10   ESTGFRAFRICA 33* 37* 42*   EGFRNONAA 29* 32* 36*   , CBC   Recent Labs   Lab 04/02/19  1215 04/03/19  0412   WBC 5.91 6.51   HGB 12.9* 12.1*   HCT 39.4* 37.4*    213   , Troponin   Recent Labs   Lab 04/02/19  1215 04/02/19  1815 04/03/19  0003   TROPONINI 0.357* 0.268* 0.247*    and All pertinent lab results from the last 24 hours have been reviewed.    Significant Imaging: Echocardiogram:   2D echo with color flow doppler:   Results for orders placed or performed during the hospital encounter of 04/02/19   2D echo with color flow doppler   Result Value Ref Range    QEF 15 (A) 55 - 65    Mitral Valve Regurgitation MILD     Aortic Valve Regurgitation MODERATE (A)     Est. PA Systolic Pressure 77.41 (A)     Tricuspid Valve Regurgitation MILD TO MODERATE    , EKG: Reviewed and X-Ray: CXR: X-Ray Chest 1 View (CXR): No results found for this visit on 04/02/19. and X-Ray Chest PA and Lateral (CXR): No results found for this visit on 04/02/19.

## 2019-04-03 NOTE — NURSING
Chart reviewed for diabetes  Patient has been seen by this nurse on previous admit  Glucose stable    No further action unless diabetes educational needs are identified

## 2019-04-03 NOTE — ASSESSMENT & PLAN NOTE
--BNP 2054  --IV furosemide given in ED  --metolazone and bumex given 1 hour apart  --strict I&O  --daily weights  --cardiology following  --2D echo pending

## 2019-04-03 NOTE — PROGRESS NOTES
[facility]  Progress Note    Patient Name: Yan Choudhury  MRN: [id number]  Patient Class: OP- Observation   Admission Date: 4/2/2019  Length of Stay: 0 days  Attending Physician: Sy Galvin MD  Primary Care Provider: Sandra Estevez MD        Subjective:     Principal Problem:SOB (shortness of breath)    HPI:  64 y/o male with PMHx of CHF, CAD, HTN, HLD, and CKD 3 who presented to the ED with c/o SOB that onset gradually over the past 3 weeks and worsened in the past 24 hours. Associated symptoms include BLE edema, weight gain, and fatigue. Patient took his bumex but did not take his metolazone. Patient reports he follows a cardiac diet but does not weigh himself daily. Symptoms are constant and moderate in severity. No mitigating or exacerbating factors reported. Patient denies fever, chills, N/V/D, chest pain, palpitations, diaphoresis, cough, congestion, and all other symptoms at this time. He sees [facility] and last appointment was 3/26/19. He sees [facility] and last appt was 3/28/19. ED workup shows: BNP 2054, troponin 0.357. Cardiology consulted by ED. He is a full code and his surrogate decision maker is his wife, Jessica.      Interval History: Patient was kept on OBS for SOB under the care of [facility]. He was given supplemental oxygen, IV furosemide, po metalozone, and PO bumex. Has a 2kg weight loss overnight. Patient remains SOB with any exertion, including conversation. He continues to have 1+ edema to BLE. Pt needs additional diuresing with IV furosemide and 2D echo is pending. Na+ remains low, and troponin was elevated X 3 but trending down, likely demand ischemia. Pt denies CP. Cardiology is following. Patient failed OBS and moved to IP     Review of Systems   Constitutional: Positive for activity change, fatigue and unexpected weight change. Negative for appetite change, chills, diaphoresis and fever.   HENT: Negative for congestion,  postnasal drip, rhinorrhea, sore throat and trouble swallowing.    Eyes: Negative for pain and discharge.   Respiratory: Positive for shortness of breath. Negative for cough, choking, chest tightness and wheezing.    Cardiovascular: Positive for leg swelling. Negative for chest pain and palpitations.   Gastrointestinal: Negative for abdominal distention, abdominal pain, constipation, diarrhea, nausea and vomiting.   Endocrine: Negative for cold intolerance and heat intolerance.   Genitourinary: Negative for difficulty urinating, dysuria and hematuria.   Musculoskeletal: Negative for arthralgias, gait problem and myalgias.   Allergic/Immunologic: Negative.    Neurological: Negative for dizziness, tremors, seizures, syncope, facial asymmetry, speech difficulty, weakness, light-headedness, numbness and headaches.   Hematological: Negative.    Psychiatric/Behavioral: Negative for agitation, behavioral problems and confusion. The patient is not nervous/anxious.      Objective:     Vital Signs (Most Recent):  Temp: 98.1 °F (36.7 °C) (04/03/19 0707)  Pulse: 80 (04/03/19 0707)  Resp: 18 (04/03/19 0707)  BP: 98/63 (04/03/19 0707)  SpO2: 100 % (04/03/19 0707) Vital Signs (24h Range):  Temp:  [97.4 °F (36.3 °C)-98.1 °F (36.7 °C)] 98.1 °F (36.7 °C)  Pulse:  [] 80  Resp:  [18-22] 18  SpO2:  [98 %-100 %] 100 %  BP: ()/(61-87) 98/63     Weight: 83 kg (182 lb 15.7 oz)  Body mass index is 27.82 kg/m².    Intake/Output Summary (Last 24 hours) at 4/3/2019 1008  Last data filed at 4/2/2019 1352  Gross per 24 hour   Intake --   Output 350 ml   Net -350 ml      Physical Exam   Constitutional: He is oriented to person, place, and time. He appears well-developed and well-nourished.   HENT:   Head: Normocephalic and atraumatic.   Nose: Nose normal.   Eyes: Conjunctivae and EOM are normal. No scleral icterus.   Neck: Normal range of motion. Neck supple. JVD present.   Cardiovascular: Normal rate, regular rhythm, normal heart  sounds and intact distal pulses. Exam reveals no gallop and no friction rub.   No murmur heard.  Pulmonary/Chest: No respiratory distress. He has decreased breath sounds in the right lower field and the left lower field. He has no rales.   Abdominal: Soft. Bowel sounds are normal. He exhibits no distension and no mass. There is no tenderness.   Genitourinary:   Genitourinary Comments: deferred   Musculoskeletal: Normal range of motion. He exhibits edema (1+ to BLE).   Neurological: He is alert and oriented to person, place, and time. No cranial nerve deficit.   Skin: Skin is warm. Capillary refill takes less than 2 seconds.   Psychiatric: He has a normal mood and affect. His behavior is normal.   Nursing note and vitals reviewed.      Significant Labs:   Recent Lab Results       04/03/19  0551   04/03/19  0412   04/03/19  0003   04/02/19  2153   04/02/19  1815        Albumin   2.9           Alkaline Phosphatase   94           ALT   17           Anion Gap   10           Appearance, UA               AST   28           Baso #   0.02           Basophil%   0.3           Bilirubin (UA)               Total Bilirubin   0.9  Comment:  For infants and newborns, interpretation of results should be based  on gestational age, weight and in agreement with clinical  observations.  Premature Infant recommended reference ranges:  Up to 24 hours.............<8.0 mg/dL  Up to 48 hours............<12.0 mg/dL  3-5 days..................<15.0 mg/dL  6-29 days.................<15.0 mg/dL             BNP               BUN, Bld   26           Calcium   9.3           Chloride   103           CO2   22           Color, UA               Creatinine   1.9           Differential Method   Automated           eGFR if    42           eGFR if non    36  Comment:  Calculation used to obtain the estimated glomerular filtration  rate (eGFR) is the CKD-EPI equation.              Eos #   0.3           Eosinophil%   3.8            Glucose   94           Glucose, UA               Gran # (ANC)   2.7           Gran%   40.9           Hematocrit   37.4           Hemoglobin   12.1           Coumadin Monitoring INR               Ketones, UA               Leukocytes, UA               Lymph #   2.5           Lymph%   39.0           MCH   28.4           MCHC   32.4           MCV   88           Mono #   1.0           Mono%   16.0           MPV   12.0           Nitrite, UA               Occult Blood UA               Ovalocytes               pH, UA               Platelets   213           POCT Glucose 107     112       Poik               Poly               Potassium   4.4           Total Protein   6.6           Protein, UA               Protime               RBC   4.26           RDW   16.8           Sodium   135           Specific [place], UA               Specimen UA               Spherocytes               Stomatocytes               Target Cells               Tear Drop Cells               Troponin I     0.247  Comment:  The reference interval for Troponin I represents the 99th percentile   cutoff   for our facility and is consistent with 3rd generation assay   performance.     0.268  Comment:  The reference interval for Troponin I represents the 99th percentile   cutoff   for our facility and is consistent with 3rd generation assay   performance.       Urobilinogen, UA               WBC   6.51                            04/02/19  1713   04/02/19  1319   04/02/19  1215        Albumin     3.3     Alkaline Phosphatase     109     ALT     21     Anion Gap     10     Appearance, UA   Clear       AST     41     Baso #     0.01     Basophil%     0.2     Bilirubin (UA)   Negative       Total Bilirubin     0.8  Comment:  For infants and newborns, interpretation of results should be based  on gestational age, weight and in agreement with clinical  observations.  Premature Infant recommended reference ranges:  Up to 24 hours.............<8.0 mg/dL  Up to 48  hours............<12.0 mg/dL  3-5 days..................<15.0 mg/dL  6-29 days.................<15.0 mg/dL       BNP     2,054  Comment:  Values of less than 100 pg/ml are consistent with non-CHF populations.     BUN, Bld     23     Calcium     9.4     Chloride     101     CO2     23     Color, UA   Yellow       Creatinine     2.1     Differential Method     Automated     eGFR if      37     eGFR if non      32  Comment:  Calculation used to obtain the estimated glomerular filtration  rate (eGFR) is the CKD-EPI equation.        Eos #     0.1     Eosinophil%     2.3     Glucose     94     Glucose, UA   Negative       Gran # (ANC)     3.1     Gran%     55.0     Hematocrit     39.4     Hemoglobin     12.9     Coumadin Monitoring INR     1.2  Comment:  Coumadin Therapy:  2.0 - 3.0 for INR for all indicators except mechanical heart valves  and antiphospholipid syndromes which should use 2.5 - 3.5.       Ketones, UA   Negative       Leukocytes, UA   Negative       Lymph #     1.6     Lymph%     28.8     MCH     28.5     MCHC     32.7     MCV     87     Mono #     0.8     Mono%     13.7     MPV     11.4     Nitrite, UA   Negative       Occult Blood UA   Negative       Ovalocytes     Occasional     pH, UA   7.0       Platelets     193     POCT Glucose 101         Poik     Slight     Poly     Occasional     Potassium     4.6     Total Protein     7.7     Protein, UA   Negative  Comment:  Recommend a 24 hour urine protein or a urine   protein/creatinine ratio if globulin induced proteinuria is  clinically suspected.         Protime     13.0     RBC     4.52     RDW     16.9     Sodium     134     Specific [place], UA   1.010       Specimen UA   Urine, Clean Catch       Spherocytes     Occasional     Stomatocytes     Present     Target Cells     Occasional     Tear Drop Cells     Occasional     Troponin I     0.357  Comment:  The reference interval for Troponin I represents the 99th percentile    cutoff   for our facility and is consistent with 3rd generation assay   performance.       Urobilinogen, UA   Negative       WBC     5.91         All pertinent labs within the past 24 hours have been reviewed.    Significant Imaging: I have reviewed all pertinent imaging results/findings within the past 24 hours.    Assessment/Plan:      * SOB (shortness of breath)  --improving but not resolved  --likely 2/2 fluid overload  --aggressive diuresis  --strict I&O  --daily weights  --cardiac diet  --supplemental oxygen to maintain sats >92%        Elevated troponin  --elevated but trending down  --likely demand ischemia 2/2 fluid overload  --0.357 >>0.268 >>0.247  --pt denies chest pain  --aggressive diuresis  --cardiology following        CKD (chronic kidney disease) stage 3, GFR 30-59 ml/min  --admit Cr 2.3, baseline 1.6. Admit eGFR 33, baseline 51  --likely 2/2 fluid overload  --improving but not at baseline  --held lisinopril 2/2 brooke  --avoid nephrotoxic agents        Chronic combined systolic and diastolic congestive heart failure  --BNP 2054  --IV furosemide given in ED  --metolazone and bumex given 1 hour apart  --strict I&O  --daily weights  --cardiology following  --2D echo pending      Hyponatremia  --improving but not resolved  --likely 2/2 fluid overload  --aggressive diuresis  --monitor BMP      BROOKE (acute kidney injury)  --Cr. 2.1, baseline 1.6, eGFR 37, baseline: 51  --hold lisinopril for now, will restart once back to baseline  --likely 2/2 fluid overload  --avoid nephrotoxic agents  --aggressive diuresis  --monitor       Gastroesophageal reflux disease  --continue pantoprazole      Type 2 diabetes mellitus with kidney complication, with long-term current use of insulin  --accuchecks and SSI  --diabetic diet  --HA1C pending      Anemia due to stage 3 chronic kidney disease  --H/H 12.1/37.4  --monitor and transfuse if indicated      Biventricular ICD (implantable cardioverter-defibrillator) in  place  --cardiology consulted  --pt reports unit has not fired to his knowledge  --will f/u OP      CAD, multiple vessel  --continue daily ASA, statin  --cardiac diet      Hyperlipidemia associated with type 2 diabetes mellitus  --continue simvastatin  --cardiac diet      Hypertension associated with diabetes  --hold lisinopril 2/2 BROOKE  --PRN hydralazine  --cardiac diet          VTE Risk Mitigation (From admission, onward)        Ordered     IP VTE HIGH RISK PATIENT  Once      04/02/19 1502     Place sequential compression device  Until discontinued      04/02/19 1502     Place CARYN hose  Until discontinued      04/02/19 1502              MANPREET Horne-JIN  Department of Hospital Medicine   [facility]

## 2019-04-03 NOTE — ASSESSMENT & PLAN NOTE
-Presents with volume overload  -Continue IV diuresis and assess response  -Continue ACEi   -Re-evaluate BP to see if patient can tolerate BB    4/3/19  -Clinically improving  -Continue IV diuresis for additional day  -ACEi and BB held given borderline BP  -2D echo showed EF of 15-20%, pulmonary HTN  -Can consider stress test for ischemic evaluation (IP vs OP)

## 2019-04-03 NOTE — SUBJECTIVE & OBJECTIVE
Interval History: Patient was kept on OBS for SOB under the care of Hospital Medicine. He was given supplemental oxygen, IV furosemide, po metalozone, and PO bumex. Has a 2kg weight loss overnight. Patient remains SOB with any exertion, including conversation. He continues to have 1+ edema to BLE. Pt needs additional diuresing with IV furosemide and 2D echo is pending. Na+ remains low, and troponin was elevated X 3 but trending down, likely demand ischemia. Pt denies CP. Cardiology is following. Patient failed OBS and moved to IP     Review of Systems   Constitutional: Positive for activity change, fatigue and unexpected weight change. Negative for appetite change, chills, diaphoresis and fever.   HENT: Negative for congestion, postnasal drip, rhinorrhea, sore throat and trouble swallowing.    Eyes: Negative for pain and discharge.   Respiratory: Positive for shortness of breath. Negative for cough, choking, chest tightness and wheezing.    Cardiovascular: Positive for leg swelling. Negative for chest pain and palpitations.   Gastrointestinal: Negative for abdominal distention, abdominal pain, constipation, diarrhea, nausea and vomiting.   Endocrine: Negative for cold intolerance and heat intolerance.   Genitourinary: Negative for difficulty urinating, dysuria and hematuria.   Musculoskeletal: Negative for arthralgias, gait problem and myalgias.   Allergic/Immunologic: Negative.    Neurological: Negative for dizziness, tremors, seizures, syncope, facial asymmetry, speech difficulty, weakness, light-headedness, numbness and headaches.   Hematological: Negative.    Psychiatric/Behavioral: Negative for agitation, behavioral problems and confusion. The patient is not nervous/anxious.      Objective:     Vital Signs (Most Recent):  Temp: 98.1 °F (36.7 °C) (04/03/19 0707)  Pulse: 80 (04/03/19 0707)  Resp: 18 (04/03/19 0707)  BP: 98/63 (04/03/19 0707)  SpO2: 100 % (04/03/19 0707) Vital Signs (24h Range):  Temp:  [97.4 °F  (36.3 °C)-98.1 °F (36.7 °C)] 98.1 °F (36.7 °C)  Pulse:  [] 80  Resp:  [18-22] 18  SpO2:  [98 %-100 %] 100 %  BP: ()/(61-87) 98/63     Weight: 83 kg (182 lb 15.7 oz)  Body mass index is 27.82 kg/m².    Intake/Output Summary (Last 24 hours) at 4/3/2019 1008  Last data filed at 4/2/2019 1352  Gross per 24 hour   Intake --   Output 350 ml   Net -350 ml      Physical Exam   Constitutional: He is oriented to person, place, and time. He appears well-developed and well-nourished.   HENT:   Head: Normocephalic and atraumatic.   Nose: Nose normal.   Eyes: Conjunctivae and EOM are normal. No scleral icterus.   Neck: Normal range of motion. Neck supple. JVD present.   Cardiovascular: Normal rate, regular rhythm, normal heart sounds and intact distal pulses. Exam reveals no gallop and no friction rub.   No murmur heard.  Pulmonary/Chest: No respiratory distress. He has decreased breath sounds in the right lower field and the left lower field. He has no rales.   Abdominal: Soft. Bowel sounds are normal. He exhibits no distension and no mass. There is no tenderness.   Genitourinary:   Genitourinary Comments: deferred   Musculoskeletal: Normal range of motion. He exhibits edema (1+ to BLE).   Neurological: He is alert and oriented to person, place, and time. No cranial nerve deficit.   Skin: Skin is warm. Capillary refill takes less than 2 seconds.   Psychiatric: He has a normal mood and affect. His behavior is normal.   Nursing note and vitals reviewed.      Significant Labs:   Recent Lab Results       04/03/19  0551   04/03/19  0412   04/03/19  0003   04/02/19  2153   04/02/19  1815        Albumin   2.9           Alkaline Phosphatase   94           ALT   17           Anion Gap   10           Appearance, UA               AST   28           Baso #   0.02           Basophil%   0.3           Bilirubin (UA)               Total Bilirubin   0.9  Comment:  For infants and newborns, interpretation of results should be  based  on gestational age, weight and in agreement with clinical  observations.  Premature Infant recommended reference ranges:  Up to 24 hours.............<8.0 mg/dL  Up to 48 hours............<12.0 mg/dL  3-5 days..................<15.0 mg/dL  6-29 days.................<15.0 mg/dL             BNP               BUN, Bld   26           Calcium   9.3           Chloride   103           CO2   22           Color, UA               Creatinine   1.9           Differential Method   Automated           eGFR if    42           eGFR if non    36  Comment:  Calculation used to obtain the estimated glomerular filtration  rate (eGFR) is the CKD-EPI equation.              Eos #   0.3           Eosinophil%   3.8           Glucose   94           Glucose, UA               Gran # (ANC)   2.7           Gran%   40.9           Hematocrit   37.4           Hemoglobin   12.1           Coumadin Monitoring INR               Ketones, UA               Leukocytes, UA               Lymph #   2.5           Lymph%   39.0           MCH   28.4           MCHC   32.4           MCV   88           Mono #   1.0           Mono%   16.0           MPV   12.0           Nitrite, UA               Occult Blood UA               Ovalocytes               pH, UA               Platelets   213           POCT Glucose 107     112       Poik               Poly               Potassium   4.4           Total Protein   6.6           Protein, UA               Protime               RBC   4.26           RDW   16.8           Sodium   135           Specific [place], UA               Specimen UA               Spherocytes               Stomatocytes               Target Cells               Tear Drop Cells               Troponin I     0.247  Comment:  The reference interval for Troponin I represents the 99th percentile   cutoff   for our facility and is consistent with 3rd generation assay   performance.     0.268  Comment:  The reference interval for  Troponin I represents the 99th percentile   cutoff   for our facility and is consistent with 3rd generation assay   performance.       Urobilinogen, UA               WBC   6.51                            04/02/19  1713   04/02/19  1319   04/02/19  1215        Albumin     3.3     Alkaline Phosphatase     109     ALT     21     Anion Gap     10     Appearance, UA   Clear       AST     41     Baso #     0.01     Basophil%     0.2     Bilirubin (UA)   Negative       Total Bilirubin     0.8  Comment:  For infants and newborns, interpretation of results should be based  on gestational age, weight and in agreement with clinical  observations.  Premature Infant recommended reference ranges:  Up to 24 hours.............<8.0 mg/dL  Up to 48 hours............<12.0 mg/dL  3-5 days..................<15.0 mg/dL  6-29 days.................<15.0 mg/dL       BNP     2,054  Comment:  Values of less than 100 pg/ml are consistent with non-CHF populations.     BUN, Bld     23     Calcium     9.4     Chloride     101     CO2     23     Color, UA   Yellow       Creatinine     2.1     Differential Method     Automated     eGFR if      37     eGFR if non      32  Comment:  Calculation used to obtain the estimated glomerular filtration  rate (eGFR) is the CKD-EPI equation.        Eos #     0.1     Eosinophil%     2.3     Glucose     94     Glucose, UA   Negative       Gran # (ANC)     3.1     Gran%     55.0     Hematocrit     39.4     Hemoglobin     12.9     Coumadin Monitoring INR     1.2  Comment:  Coumadin Therapy:  2.0 - 3.0 for INR for all indicators except mechanical heart valves  and antiphospholipid syndromes which should use 2.5 - 3.5.       Ketones, UA   Negative       Leukocytes, UA   Negative       Lymph #     1.6     Lymph%     28.8     MCH     28.5     MCHC     32.7     MCV     87     Mono #     0.8     Mono%     13.7     MPV     11.4     Nitrite, UA   Negative       Occult Blood UA   Negative        Ovalocytes     Occasional     pH, UA   7.0       Platelets     193     POCT Glucose 101         Poik     Slight     Poly     Occasional     Potassium     4.6     Total Protein     7.7     Protein, UA   Negative  Comment:  Recommend a 24 hour urine protein or a urine   protein/creatinine ratio if globulin induced proteinuria is  clinically suspected.         Protime     13.0     RBC     4.52     RDW     16.9     Sodium     134     Specific [place], UA   1.010       Specimen UA   Urine, Clean Catch       Spherocytes     Occasional     Stomatocytes     Present     Target Cells     Occasional     Tear Drop Cells     Occasional     Troponin I     0.357  Comment:  The reference interval for Troponin I represents the 99th percentile   cutoff   for our facility and is consistent with 3rd generation assay   performance.       Urobilinogen, UA   Negative       WBC     5.91         All pertinent labs within the past 24 hours have been reviewed.    Significant Imaging: I have reviewed all pertinent imaging results/findings within the past 24 hours.

## 2019-04-03 NOTE — ASSESSMENT & PLAN NOTE
-Suspect secondary to cardiorenal syndrome  -Monitor with diuresis  -May need nephrology consult    4/3/19  -Cardiorenal syndrome  -Creatinine 1.9  -Monitor with diuresis

## 2019-04-03 NOTE — HOSPITAL COURSE
4/3/19-Patient seen and examined today. Feels better. SOB greatly improved. No chest pain. Labs reviewed. Creatinine stable, 1.9. Troponin trending down. Repeat 2D echo showed EF 15-20%, pulmonary HTN.    4/4/19-Patient seen and examined today. Feels back to baseline. Shortness of breath and lower extremity edema have resolved. Remains chest pain free. Labs reviewed. Creatinine 1.7. Discussed stress test IP vs OP, patient wanted to complete as OP.

## 2019-04-03 NOTE — ASSESSMENT & PLAN NOTE
--elevated but trending down  --likely demand ischemia 2/2 fluid overload  --0.357 >>0.268 >>0.247  --pt denies chest pain  --aggressive diuresis  --cardiology following

## 2019-04-03 NOTE — ASSESSMENT & PLAN NOTE
-Initial troponin 0.357  -Trend  -Secondary to demand ischemia from decompensated combined CHF and underlying CKD  -Continue ASA, statin  -Continue ACEi  -Will add BB if BP can tolerate  -Repeat echo in AM    4/3/19  -Troponin 0.356>0.268>0.247  -Continue ASA, statin  -Unable to tolerate BB and ACEI given borderline low BP  -Echo showed EF of 15-20%, pulmonary HTN  -Consider ischemic evaluation with stress test (IP vs OP)

## 2019-04-03 NOTE — ASSESSMENT & PLAN NOTE
--improving but not resolved  --likely 2/2 fluid overload  --aggressive diuresis  --strict I&O  --daily weights  --cardiac diet  --supplemental oxygen to maintain sats >92%

## 2019-04-04 VITALS
BODY MASS INDEX: 26.53 KG/M2 | OXYGEN SATURATION: 100 % | SYSTOLIC BLOOD PRESSURE: 95 MMHG | WEIGHT: 175.06 LBS | HEART RATE: 94 BPM | RESPIRATION RATE: 18 BRPM | HEIGHT: 68 IN | DIASTOLIC BLOOD PRESSURE: 55 MMHG | TEMPERATURE: 97 F

## 2019-04-04 PROBLEM — E87.1 HYPONATREMIA: Status: RESOLVED | Noted: 2018-12-28 | Resolved: 2019-04-04

## 2019-04-04 PROBLEM — N17.9 AKI (ACUTE KIDNEY INJURY): Status: RESOLVED | Noted: 2018-10-31 | Resolved: 2019-04-04

## 2019-04-04 PROBLEM — R79.89 ELEVATED TROPONIN: Status: RESOLVED | Noted: 2019-04-02 | Resolved: 2019-04-04

## 2019-04-04 LAB
ALBUMIN SERPL BCP-MCNC: 2.6 G/DL (ref 3.5–5.2)
ALP SERPL-CCNC: 93 U/L (ref 55–135)
ALT SERPL W/O P-5'-P-CCNC: 15 U/L (ref 10–44)
ANION GAP SERPL CALC-SCNC: 8 MMOL/L (ref 8–16)
AST SERPL-CCNC: 27 U/L (ref 10–40)
BASOPHILS # BLD AUTO: 0.01 K/UL (ref 0–0.2)
BASOPHILS NFR BLD: 0.2 % (ref 0–1.9)
BILIRUB SERPL-MCNC: 0.8 MG/DL (ref 0.1–1)
BUN SERPL-MCNC: 25 MG/DL (ref 8–23)
CALCIUM SERPL-MCNC: 9.4 MG/DL (ref 8.7–10.5)
CHLORIDE SERPL-SCNC: 101 MMOL/L (ref 95–110)
CO2 SERPL-SCNC: 26 MMOL/L (ref 23–29)
CREAT SERPL-MCNC: 1.7 MG/DL (ref 0.5–1.4)
DIFFERENTIAL METHOD: ABNORMAL
EOSINOPHIL # BLD AUTO: 0.2 K/UL (ref 0–0.5)
EOSINOPHIL NFR BLD: 3.4 % (ref 0–8)
ERYTHROCYTE [DISTWIDTH] IN BLOOD BY AUTOMATED COUNT: 16.7 % (ref 11.5–14.5)
EST. GFR  (AFRICAN AMERICAN): 48 ML/MIN/1.73 M^2
EST. GFR  (NON AFRICAN AMERICAN): 41 ML/MIN/1.73 M^2
GLUCOSE SERPL-MCNC: 105 MG/DL (ref 70–110)
HCT VFR BLD AUTO: 35.4 % (ref 40–54)
HGB BLD-MCNC: 11.6 G/DL (ref 14–18)
LYMPHOCYTES # BLD AUTO: 2 K/UL (ref 1–4.8)
LYMPHOCYTES NFR BLD: 35.5 % (ref 18–48)
MCH RBC QN AUTO: 28.2 PG (ref 27–31)
MCHC RBC AUTO-ENTMCNC: 32.8 G/DL (ref 32–36)
MCV RBC AUTO: 86 FL (ref 82–98)
MONOCYTES # BLD AUTO: 0.9 K/UL (ref 0.3–1)
MONOCYTES NFR BLD: 15.6 % (ref 4–15)
NEUTROPHILS # BLD AUTO: 2.5 K/UL (ref 1.8–7.7)
NEUTROPHILS NFR BLD: 45.3 % (ref 38–73)
PLATELET # BLD AUTO: 203 K/UL (ref 150–350)
PMV BLD AUTO: 11.2 FL (ref 9.2–12.9)
POTASSIUM SERPL-SCNC: 3.9 MMOL/L (ref 3.5–5.1)
PROT SERPL-MCNC: 6.2 G/DL (ref 6–8.4)
RBC # BLD AUTO: 4.12 M/UL (ref 4.6–6.2)
SODIUM SERPL-SCNC: 135 MMOL/L (ref 136–145)
WBC # BLD AUTO: 5.57 K/UL (ref 3.9–12.7)

## 2019-04-04 PROCEDURE — 25000003 PHARM REV CODE 250: Performed by: EMERGENCY MEDICINE

## 2019-04-04 PROCEDURE — 36415 COLL VENOUS BLD VENIPUNCTURE: CPT

## 2019-04-04 PROCEDURE — 80053 COMPREHEN METABOLIC PANEL: CPT

## 2019-04-04 PROCEDURE — 85025 COMPLETE CBC W/AUTO DIFF WBC: CPT

## 2019-04-04 PROCEDURE — 94760 N-INVAS EAR/PLS OXIMETRY 1: CPT

## 2019-04-04 PROCEDURE — 25000003 PHARM REV CODE 250: Performed by: INTERNAL MEDICINE

## 2019-04-04 PROCEDURE — 99232 PR SUBSEQUENT HOSPITAL CARE,LEVL II: ICD-10-PCS | Mod: ,,, | Performed by: INTERNAL MEDICINE

## 2019-04-04 PROCEDURE — 25000003 PHARM REV CODE 250: Performed by: NURSE PRACTITIONER

## 2019-04-04 PROCEDURE — 99232 SBSQ HOSP IP/OBS MODERATE 35: CPT | Mod: ,,, | Performed by: INTERNAL MEDICINE

## 2019-04-04 RX ORDER — METOLAZONE 5 MG/1
5 TABLET ORAL DAILY PRN
Qty: 30 TABLET | Refills: 0 | Status: SHIPPED | OUTPATIENT
Start: 2019-04-04 | End: 2020-02-05

## 2019-04-04 RX ORDER — METOLAZONE 5 MG/1
5 TABLET ORAL DAILY PRN
Qty: 1 TABLET | Refills: 0
Start: 2019-04-04 | End: 2019-04-04

## 2019-04-04 RX ORDER — BUMETANIDE 2 MG/1
TABLET ORAL
Qty: 75 TABLET | Refills: 11
Start: 2019-04-04 | End: 2019-08-12 | Stop reason: SDUPTHER

## 2019-04-04 RX ADMIN — ASPIRIN 81 MG: 81 TABLET, COATED ORAL at 08:04

## 2019-04-04 RX ADMIN — PANTOPRAZOLE SODIUM 40 MG: 40 TABLET, DELAYED RELEASE ORAL at 08:04

## 2019-04-04 RX ADMIN — ALLOPURINOL 100 MG: 100 TABLET ORAL at 08:04

## 2019-04-04 RX ADMIN — BUMETANIDE 2 MG: 1 TABLET ORAL at 08:04

## 2019-04-04 RX ADMIN — METOLAZONE 5 MG: 5 TABLET ORAL at 08:04

## 2019-04-04 NOTE — PLAN OF CARE
Met with patient and wife, Jessica, to complete d/c planning assessment. Patient reports he is independent with ADLs and does not anticipate any d/c needs at this time. Report given to YORDAN Zuniga [facility].     04/04/19 0943   Discharge Assessment   Assessment Type Discharge Planning Assessment   Confirmed/corrected address and phone number on facesheet? Yes   Assessment information obtained from? Patient;Caregiver   Prior to hospitilization cognitive status: Alert/Oriented   Prior to hospitalization functional status: Independent   Current cognitive status: Alert/Oriented   Current Functional Status: Independent   Able to Return to Prior Arrangements yes   Is patient able to care for self after discharge? Yes   Who are your caregiver(s) and their phone number(s)? Yan Kei [phone]   Readmission Within the Last 30 Days no previous admission in last 30 days   Patient currently being followed by outpatient case management? Unable to determine (comments)   Patient currently receives any other outside agency services? No   Equipment Currently Used at Home nebulizer   Do you have any problems affording any of your prescribed medications? No   Is the patient taking medications as prescribed? yes   Does the patient have transportation home? Yes   Discharge Plan A Home with family   DME Needed Upon Discharge  none   Patient/Family in Agreement with Plan yes

## 2019-04-04 NOTE — NURSING
Went over discharge instructions with patient.   Stressed importance of making and keeping all follow ups as well as making prescribed medication changes.   Patient verbalized understanding and has no questions in regards to discharge.  IV removed, catheter intact.  Telemetry box 8638 removed and returned to monitor tech.  Patient awaiting personal transportation, instructed to call nurse station once ride has arrived.  Primary nurse notified of pt's discharge status.

## 2019-04-04 NOTE — DISCHARGE SUMMARY
[facility] Hospital Medicine  Discharge Summary      Patient Name: Yan Choudhury  MRN: [id number]  Admission Date: 4/2/2019  Hospital Length of Stay: 1 days  Discharge Date and Time:  04/04/2019 9:38 AM  Attending Physician: Sy Galvin MD   Discharging Provider: MIAH Horne  Primary Care Provider: Sandra Estevez MD      HPI:   62 y/o male with PMHx of CHF, CAD, HTN, HLD, and CKD 3 who presented to the ED with c/o SOB that onset gradually over the past 3 weeks and worsened in the past 24 hours. Associated symptoms include BLE edema, weight gain, and fatigue. Patient took his bumex but did not take his metolazone. Patient reports he follows a cardiac diet but does not weigh himself daily. Symptoms are constant and moderate in severity. No mitigating or exacerbating factors reported. Patient denies fever, chills, N/V/D, chest pain, palpitations, diaphoresis, cough, congestion, and all other symptoms at this time. He sees [facility] and last appointment was 3/26/19. He sees [facility] and last appt was 3/28/19. ED workup shows: BNP 2054, troponin 0.357. Cardiology consulted by ED. He is a full code and his surrogate decision maker is his wife, Jessica.    * No surgery found *      Hospital Course:   Patient was kept on OBS for SOB under the care of [facility]. He was given supplemental oxygen, IV furosemide, po metalozone, and PO bumex. Has a 2kg weight loss overnight. Patient remains SOB with any exertion, including conversation. He continues to have 1+ edema to BLE. Pt needs additional diuresing with IV furosemide and 2D echo is pending. Na+ remains low, and troponin was elevated X 3 but trending down, likely demand ischemia. Pt denies CP. Cardiology is following. Patient failed OBS and moved to IP.   Patient continued to diurese and has lost a total of 4Kg since admit. Patient was kept on OBS for SOB under the care of [facility]. He was given  supplemental oxygen, IV furosemide, po metalozone, and PO bumex. Has a 2kg weight loss overnight. Patient remains SOB with any exertion, including conversation. He continues to have 1+ edema to BLE. Pt needs additional diuresing with IV furosemide and 2D echo is pending. Na+ remains low, and troponin was elevated X 3 but trending down, likely demand ischemia. Pt denies CP. Cardiology is following. Patient failed OBS and moved to IP   His breathing is much improved, and BLE edema is resolved at present. 2D echo showed EF 15% with moderate AVR and PA sys pressure 77.41 Had long discussion about following a cardiac diet, weighting self daily, when and how to take PRN metolazone. Patient and spouse verbalized understanding of all. He reported that he is not compliant with diet but that he does understand that diet affects his heart. Discussed case with cardiology who is okay with discharge today. He will have OP stress test and f/u with Dr. Calvillo on 4/8/19. Patient was seen and examined today and deemed stable for discharge home.     Consults:   Consults (From admission, onward)        Status Ordering Provider     Inpatient consult to Cardiology  Once     Provider:  Zayra Mercer MD    Completed CHRISTIANE HAWLEY            Final Active Diagnoses:    Diagnosis Date Noted POA    PRINCIPAL PROBLEM:  Acute on chronic combined systolic and diastolic congestive heart failure [I50.43] 01/15/2019 Yes    Congestive heart failure [I50.9] 04/02/2019 Yes    CKD (chronic kidney disease) stage 3, GFR 30-59 ml/min [N18.3] 01/23/2019 Yes    Gastroesophageal reflux disease [K21.9] 11/17/2016 Yes    Type 2 diabetes mellitus with kidney complication, with long-term current use of insulin [E11.29, Z79.4] 11/17/2016 Not Applicable    Anemia due to stage 3 chronic kidney disease [N18.3, D63.1] 04/23/2016 Yes    Biventricular ICD (implantable cardioverter-defibrillator) in place [Z95.810] 04/19/2016 Yes     Chronic     Hypertension associated with diabetes [E11.59, I10]  Yes    Hyperlipidemia associated with type 2 diabetes mellitus [E11.69, E78.5]  Yes      Problems Resolved During this Admission:    Diagnosis Date Noted Date Resolved POA    Elevated troponin [R74.8] 04/02/2019 04/04/2019 Yes    Hyponatremia [E87.1] 12/28/2018 04/04/2019 Unknown    BROOKE (acute kidney injury) [N17.9] 10/31/2018 04/04/2019 Unknown    CAD, multiple vessel [I25.10] 07/13/2015 04/04/2019 Yes     Chronic    SOB (shortness of breath) [R06.02] 03/16/2015 04/04/2019 Yes       Discharged Condition: stable    Disposition: Home or Self Care    Follow Up:  Follow-up Information     Luther Calvillo MD In 1 week.    Specialties:  Cardiology, INTERVENTIONAL CARDIOLOGY  Contact information:  [address]  [place] [address] [phone]                 Patient Instructions:      Diet Cardiac     Diet diabetic     Notify your health care provider if you experience any of the following:  difficulty breathing or increased cough     Notify your health care provider if you experience any of the following:  persistent dizziness, light-headedness, or visual disturbances     Activity as tolerated       Significant Diagnostic Studies: Labs:   BMP:   Recent Labs   Lab 04/02/19  1215 04/03/19  0412 04/04/19  0357   GLU 94 94 105   * 135* 135*   K 4.6 4.4 3.9    103 101   CO2 23 22* 26   BUN 23 26* 25*   CREATININE 2.1* 1.9* 1.7*   CALCIUM 9.4 9.3 9.4   , CMP   Recent Labs   Lab 04/02/19  1215 04/03/19  0412 04/04/19  0357   * 135* 135*   K 4.6 4.4 3.9    103 101   CO2 23 22* 26   GLU 94 94 105   BUN 23 26* 25*   CREATININE 2.1* 1.9* 1.7*   CALCIUM 9.4 9.3 9.4   PROT 7.7 6.6 6.2   ALBUMIN 3.3* 2.9* 2.6*   BILITOT 0.8 0.9 0.8   ALKPHOS 109 94 93   AST 41* 28 27   ALT 21 17 15   ANIONGAP 10 10 8   ESTGFRAFRICA 37* 42* 48*   EGFRNONAA 32* 36* 41*   , CBC   Recent Labs   Lab 04/02/19  1215 04/03/19  0412 04/04/19  0357   WBC 5.91 6.51 5.57  "  HGB 12.9* 12.1* 11.6*   HCT 39.4* 37.4* 35.4*    213 203    and All labs within the past 24 hours have been reviewed    Pending Diagnostic Studies:     None         Medications:  Reconciled Home Medications:      Medication List      START taking these medications    metOLazone 5 MG tablet  Commonly known as:  ZAROXOLYN  Take 1 tablet (5 mg total) by mouth daily as needed.        CHANGE how you take these medications    bumetanide 2 MG tablet  Commonly known as:  BUMEX  MWF take 1 whole tab and on Tues, Thurs, Sat, Sun  Take 1/2 tab twice daily  What changed:  additional instructions     lisinopril 5 MG tablet  Commonly known as:  PRINIVIL,ZESTRIL  TAKE 1 TABLET (5 MG TOTAL) BY MOUTH ONCE DAILY.  What changed:  when to take this        CONTINUE taking these medications    albuterol 90 mcg/actuation inhaler  Commonly known as:  VENTOLIN HFA  Inhale 2 puffs into the lungs every 6 (six) hours as needed for Wheezing. Rescue     allopurinol 100 MG tablet  Commonly known as:  ZYLOPRIM  TAKE 1 TABLET (100 MG TOTAL) BY MOUTH ONCE DAILY.     aspirin 81 MG EC tablet  Commonly known as:  ECOTRIN  Take 81 mg by mouth every morning.     BD INSULIN SYRINGE ULTRA-FINE 0.5 mL 31 gauge x 5/16" Syrg  Generic drug:  insulin syringe-needle U-100  USE AS DIRECTED TO INJECT INSULIN TWO TIMES DAILY     bimatoprost 0.03 % ophthalmic drops  Commonly known as:  LUMIGAN  Place 1 drop into the left eye every evening.     blood sugar diagnostic Strp  1 strip by Misc.(Non-Drug; Combo Route) route 4 (four) times daily. [employer]     GERITOL ORAL  Take by mouth.     insulin glargine 100 unit/mL injection  Commonly known as:  LANTUS  50 units bid prn when BS< 150     lancets Misc  For tel care     nozaseptin nasal   Commonly known as:  NOZIN  1 each by Each Nare route 2 (two) times daily.     pantoprazole 40 MG tablet  Commonly known as:  PROTONIX  TAKE 1 TABLET BY MOUTH EVERY DAY FOR ACID REFLUX     pen needle, diabetic 32 gauge x " "5/32" Ndle  Commonly known as:  BD ULTRA-FINE JOSLYN PEN NEEDLE  1 each by Misc.(Non-Drug; Combo Route) route 4 (four) times daily.     simvastatin 10 MG tablet  Commonly known as:  ZOCOR  TAKE 1 TABLET (10 MG TOTAL) BY MOUTH EVERY EVENING.     VITAMIN D3 1,000 unit capsule  Generic drug:  cholecalciferol (vitamin D3)  Take 1,000 Units by mouth once daily.        STOP taking these medications    diphenoxylate-atropine 2.5-0.025 mg 2.5-0.025 mg per tablet  Commonly known as:  LOMOTIL            Indwelling Lines/Drains at time of discharge:   Lines/Drains/Airways          None          Time spent on the discharge of patient: 50 minutes  Patient was seen and examined on the date of discharge and determined to be suitable for discharge.         MANPREET Horne-C  Department of Hospital Medicine  [facility]  "

## 2019-04-04 NOTE — SUBJECTIVE & OBJECTIVE
Review of Systems   Constitution: Negative.   HENT: Negative.    Eyes: Negative.    Cardiovascular: Negative.    Respiratory: Negative.    Endocrine: Negative.    Hematologic/Lymphatic: Negative.    Skin: Negative.    Musculoskeletal: Negative.    Gastrointestinal: Negative.    Genitourinary: Negative.    Neurological: Negative.    Psychiatric/Behavioral: Negative.    Allergic/Immunologic: Negative.      Objective:     Vital Signs (Most Recent):  Temp: 97.1 °F (36.2 °C) (04/04/19 0720)  Pulse: 94 (04/04/19 0720)  Resp: 18 (04/04/19 0720)  BP: (!) 95/55 (04/04/19 0720)  SpO2: 100 % (04/04/19 0800) Vital Signs (24h Range):  Temp:  [97.1 °F (36.2 °C)-98.3 °F (36.8 °C)] 97.1 °F (36.2 °C)  Pulse:  [] 94  Resp:  [16-22] 18  SpO2:  [95 %-100 %] 100 %  BP: ()/(55-79) 95/55     Weight: 79.4 kg (175 lb 0.7 oz)  Body mass index is 26.62 kg/m².     SpO2: 100 %  O2 Device (Oxygen Therapy): room air      Intake/Output Summary (Last 24 hours) at 4/4/2019 1525  Last data filed at 4/4/2019 0500  Gross per 24 hour   Intake 2250 ml   Output 1300 ml   Net 950 ml       Lines/Drains/Airways          None          Physical Exam   Constitutional: He is oriented to person, place, and time. He appears well-developed and well-nourished. No distress.   HENT:   Head: Normocephalic and atraumatic.   Eyes: Pupils are equal, round, and reactive to light. Right eye exhibits no discharge. Left eye exhibits no discharge.   Neck: Neck supple. No JVD present.   Cardiovascular: Normal rate, regular rhythm, S1 normal, S2 normal and normal heart sounds.   No murmur heard.  Pulmonary/Chest: Effort normal and breath sounds normal. No respiratory distress. He has no wheezes. He has no rales.   AICD site well-healed   Abdominal: Soft. He exhibits no distension.   Numerous healed abdominal scars     Musculoskeletal: He exhibits no edema.   Neurological: He is alert and oriented to person, place, and time.   Skin: Skin is warm and dry. He is not  diaphoretic. No erythema.   Psychiatric: He has a normal mood and affect. His behavior is normal. Thought content normal.   Nursing note and vitals reviewed.      Significant Labs:   CMP   Recent Labs   Lab 04/03/19 0412 04/04/19  0357   * 135*   K 4.4 3.9    101   CO2 22* 26   GLU 94 105   BUN 26* 25*   CREATININE 1.9* 1.7*   CALCIUM 9.3 9.4   PROT 6.6 6.2   ALBUMIN 2.9* 2.6*   BILITOT 0.9 0.8   ALKPHOS 94 93   AST 28 27   ALT 17 15   ANIONGAP 10 8   ESTGFRAFRICA 42* 48*   EGFRNONAA 36* 41*   , CBC   Recent Labs   Lab 04/03/19 0412 04/04/19  0357   WBC 6.51 5.57   HGB 12.1* 11.6*   HCT 37.4* 35.4*    203   , Troponin   Recent Labs   Lab 04/02/19  1815 04/03/19  0003   TROPONINI 0.268* 0.247*    and All pertinent lab results from the last 24 hours have been reviewed.    Significant Imaging: Echocardiogram:   2D echo with color flow doppler:   Results for orders placed or performed during the hospital encounter of 04/02/19   2D echo with color flow doppler   Result Value Ref Range    QEF 15 (A) 55 - 65    Mitral Valve Regurgitation MILD     Aortic Valve Regurgitation MODERATE (A)     Est. PA Systolic Pressure 77.41 (A)     Tricuspid Valve Regurgitation MILD TO MODERATE    , EKG: Reviewed and X-Ray: CXR: X-Ray Chest 1 View (CXR): No results found for this visit on 04/02/19. and X-Ray Chest PA and Lateral (CXR): No results found for this visit on 04/02/19.

## 2019-04-04 NOTE — ASSESSMENT & PLAN NOTE
-Presents with volume overload  -Continue IV diuresis and assess response  -Continue ACEi   -Re-evaluate BP to see if patient can tolerate BB    4/3/19  -Clinically improving  -Continue IV diuresis for additional day  -ACEi and BB held given borderline BP  -2D echo showed EF of 15-20%, pulmonary HTN  -Can consider stress test for ischemic evaluation (IP vs OP)    4/4/19  -Feels back to baseline, good diuresis overnight  -Wants to go home  -Resume home diuretic regimen/ACEi   -Unable to tolerate BB due to hypotension, can re-evaluate as OP  -Stress test as OP, given reduced EF

## 2019-04-04 NOTE — PLAN OF CARE
Problem: Adult Inpatient Plan of Care  Goal: Plan of Care Review  Outcome: Ongoing (interventions implemented as appropriate)  POC reviewed, verbalized understanding. Pt remained free from falls, fall precautions in place. Pt is paced on monitor, 80s-90s. VSS. POCT AC/HS. No other c/o at this time. PIV intact. Call bell and personal belongings within reach. Hourly rounding complete. Reminded to call for assistance. Will continue to monitor.

## 2019-04-04 NOTE — PROGRESS NOTES
[facility]  Cardiology  Progress Note    Patient Name: Yan Choudhury  MRN: [id number]  Admission Date: 4/2/2019  Hospital Length of Stay: 1 days  Code Status: Prior   Attending Physician: No att. providers found   Primary Care Physician: Sandra Estevez MD  Expected Discharge Date: 4/4/2019  Principal Problem:Acute on chronic combined systolic and diastolic congestive heart failure    Subjective:   HPI:  Mr. Choudhury is a 63 year old male patient whose current medical conditions include CAD/ICM, DCM, LBBB, HTN, CRI, DM s/p CRT-ICD, PHTN, dyslipidemia, obesity, and s/p colostomy reversal/parastomal repair and ileostomy placement and reversal who presented to [facility] ED today with a chief complaint of worsening SOB over the past 1-2 weeks. Associated symptoms included weight gain, orthopnea, and bilateral lower extremity edema. He denied any associated davion chest pain, lightheadedness, dizziness, near syncope, or syncope. Initial workup in ED revealed troponin of 0.357, BNP> 2,000, and creatinine of 2.1. Patient subsequently admitted for further evaluation and treatment. Cardiology consulted to assist with management. Patient seen and examined today while in ED. He is well known to cardiology service. Seen last week in clinic and had Zaroxolyn added and po diuretics increased without much improvement in symptoms. Still complains of SOB and LE edema at time of exam. Remains chest pain free. No other complaints. He reports compliance with his medications. Variable  Compliance with diet. Chart reviewed. Repeat troponin pending. 2D echo 10/18 showed EF of 30-35%, DD. EKG reviewed and showed no acute ischemic changes.       Hospital Course:   4/3/19-Patient seen and examined today. Feels better. SOB greatly improved. No chest pain. Labs reviewed. Creatinine stable, 1.9. Troponin trending down. Repeat 2D echo showed EF 15-20%, pulmonary HTN.    4/4/19-Patient seen and examined today. Feels back to baseline. Shortness  of breath and lower extremity edema have resolved. Remains chest pain free. Labs reviewed. Creatinine 1.7. Discussed stress test IP vs OP, patient wanted to complete as OP.        Review of Systems   Constitution: Negative.   HENT: Negative.    Eyes: Negative.    Cardiovascular: Negative.    Respiratory: Negative.    Endocrine: Negative.    Hematologic/Lymphatic: Negative.    Skin: Negative.    Musculoskeletal: Negative.    Gastrointestinal: Negative.    Genitourinary: Negative.    Neurological: Negative.    Psychiatric/Behavioral: Negative.    Allergic/Immunologic: Negative.      Objective:     Vital Signs (Most Recent):  Temp: 97.1 °F (36.2 °C) (04/04/19 0720)  Pulse: 94 (04/04/19 0720)  Resp: 18 (04/04/19 0720)  BP: (!) 95/55 (04/04/19 0720)  SpO2: 100 % (04/04/19 0800) Vital Signs (24h Range):  Temp:  [97.1 °F (36.2 °C)-98.3 °F (36.8 °C)] 97.1 °F (36.2 °C)  Pulse:  [] 94  Resp:  [16-22] 18  SpO2:  [95 %-100 %] 100 %  BP: ()/(55-79) 95/55     Weight: 79.4 kg (175 lb 0.7 oz)  Body mass index is 26.62 kg/m².     SpO2: 100 %  O2 Device (Oxygen Therapy): room air      Intake/Output Summary (Last 24 hours) at 4/4/2019 1525  Last data filed at 4/4/2019 0500  Gross per 24 hour   Intake 2250 ml   Output 1300 ml   Net 950 ml       Lines/Drains/Airways          None          Physical Exam   Constitutional: He is oriented to person, place, and time. He appears well-developed and well-nourished. No distress.   HENT:   Head: Normocephalic and atraumatic.   Eyes: Pupils are equal, round, and reactive to light. Right eye exhibits no discharge. Left eye exhibits no discharge.   Neck: Neck supple. No JVD present.   Cardiovascular: Normal rate, regular rhythm, S1 normal, S2 normal and normal heart sounds.   No murmur heard.  Pulmonary/Chest: Effort normal and breath sounds normal. No respiratory distress. He has no wheezes. He has no rales.   AICD site well-healed   Abdominal: Soft. He exhibits no distension.    Numerous healed abdominal scars     Musculoskeletal: He exhibits no edema.   Neurological: He is alert and oriented to person, place, and time.   Skin: Skin is warm and dry. He is not diaphoretic. No erythema.   Psychiatric: He has a normal mood and affect. His behavior is normal. Thought content normal.   Nursing note and vitals reviewed.      Significant Labs:   CMP   Recent Labs   Lab 04/03/19  0412 04/04/19  0357   * 135*   K 4.4 3.9    101   CO2 22* 26   GLU 94 105   BUN 26* 25*   CREATININE 1.9* 1.7*   CALCIUM 9.3 9.4   PROT 6.6 6.2   ALBUMIN 2.9* 2.6*   BILITOT 0.9 0.8   ALKPHOS 94 93   AST 28 27   ALT 17 15   ANIONGAP 10 8   ESTGFRAFRICA 42* 48*   EGFRNONAA 36* 41*   , CBC   Recent Labs   Lab 04/03/19  0412 04/04/19  0357   WBC 6.51 5.57   HGB 12.1* 11.6*   HCT 37.4* 35.4*    203   , Troponin   Recent Labs   Lab 04/02/19  1815 04/03/19  0003   TROPONINI 0.268* 0.247*    and All pertinent lab results from the last 24 hours have been reviewed.    Significant Imaging: Echocardiogram:   2D echo with color flow doppler:   Results for orders placed or performed during the hospital encounter of 04/02/19   2D echo with color flow doppler   Result Value Ref Range    QEF 15 (A) 55 - 65    Mitral Valve Regurgitation MILD     Aortic Valve Regurgitation MODERATE (A)     Est. PA Systolic Pressure 77.41 (A)     Tricuspid Valve Regurgitation MILD TO MODERATE    , EKG: Reviewed and X-Ray: CXR: X-Ray Chest 1 View (CXR): No results found for this visit on 04/02/19. and X-Ray Chest PA and Lateral (CXR): No results found for this visit on 04/02/19.    Assessment and Plan:   Patient who presents with decompensated combined CHF. EF 15-20%. Clinically improved with diuresis. No chest pain. Elevated troponin secondary to demand ischemia from volume overload and CKD. Stress test as OP.    * Acute on chronic combined systolic and diastolic congestive heart failure  -Presents with volume overload  -Continue IV  diuresis and assess response  -Continue ACEi   -Re-evaluate BP to see if patient can tolerate BB    4/3/19  -Clinically improving  -Continue IV diuresis for additional day  -ACEi and BB held given borderline BP  -2D echo showed EF of 15-20%, pulmonary HTN  -Can consider stress test for ischemic evaluation (IP vs OP)    4/4/19  -Feels back to baseline, good diuresis overnight  -Wants to go home  -Resume home diuretic regimen/ACEi   -Unable to tolerate BB due to hypotension, can re-evaluate as OP  -Stress test as OP, given reduced EF    CKD (chronic kidney disease) stage 3, GFR 30-59 ml/min  -Creatinine improved to 1.7    Hyperlipidemia associated with type 2 diabetes mellitus  -Continue statin        VTE Risk Mitigation (From admission, onward)        Ordered     IP VTE HIGH RISK PATIENT  Once      04/02/19 1502          Jovana Woodard PA-C  Cardiology  [facility]    Chart reviewed. Dr. Mercer examined patient and agrees with plan as outlined above.

## 2019-04-04 NOTE — HOSPITAL COURSE
Patient was kept on OBS for SOB under the care of [facility]. He was given supplemental oxygen, IV furosemide, po metalozone, and PO bumex. Has a 2kg weight loss overnight. Patient remains SOB with any exertion, including conversation. He continues to have 1+ edema to BLE. Pt needs additional diuresing with IV furosemide and 2D echo is pending. Na+ remains low, and troponin was elevated X 3 but trending down, likely demand ischemia. Pt denies CP. Cardiology is following. Patient failed OBS and moved to IP.   Patient continued to diurese and has lost a total of 4Kg since admit. Patient was kept on OBS for SOB under the care of [facility]. He was given supplemental oxygen, IV furosemide, po metalozone, and PO bumex. Has a 2kg weight loss overnight. Patient remains SOB with any exertion, including conversation. He continues to have 1+ edema to BLE. Pt needs additional diuresing with IV furosemide and 2D echo is pending. Na+ remains low, and troponin was elevated X 3 but trending down, likely demand ischemia. Pt denies CP. Cardiology is following. Patient failed OBS and moved to IP   His breathing is much improved, and BLE edema is resolved at present. 2D echo showed EF 15% with moderate AVR and PA sys pressure 77.41 Had long discussion about following a cardiac diet, weighting self daily, when and how to take PRN metolazone. Patient and spouse verbalized understanding of all. He reported that he is not compliant with diet but that he does understand that diet affects his heart. Discussed case with cardiology who is okay with discharge today. He will have OP stress test and f/u with Dr. Calvillo on 4/8/19. Patient was seen and examined today and deemed stable for discharge home.

## 2019-04-07 PROBLEM — I35.1 NONRHEUMATIC AORTIC VALVE INSUFFICIENCY: Status: ACTIVE | Noted: 2019-04-07

## 2019-04-07 PROBLEM — I47.29 NSVT (NONSUSTAINED VENTRICULAR TACHYCARDIA): Status: ACTIVE | Noted: 2019-04-07

## 2019-04-07 NOTE — PROGRESS NOTES
Subjective:    Patient ID:  Yan Choudhury is a 63 y.o. male who presents for evaluation of Hypertension; Hyperlipidemia; Coronary Artery Disease; Congestive Heart Failure; and Risk Factor Management For Atherosclerosis        HPI Pt presents for fu.  His current medical conditions include CHF, CAD/ICM, DCM, LBBB, HTN, CRI, DM, CRT-ICD, PTHN, dyslipidemia, obesity. Nonsmoker.   Past hx pertinent for following:  LHC was done 2009 showed diffuse CAD, and severe distal CAD involving apical LAD, distal RCA/distal LCX.   Echo Oct 2010 showed LVEF 25%.   PET stress 2016 showed inferior scar, no significant ischemia noted and LVEF < 35%.   s/p BIV ICD 3/16 for CHF/LBBB.  Now here.  S/p admission 4/19 for acute on chronic CHF exacerbation.  Echo April 19 EF 15 - 20%, LVE, mod AI, mild-mod TR, mild MR, severe PHTN 77 mmHg.  BNP 4/19 markedly more elevated.  Troponin + 4/19, higher than normal.  ecg 4/2/19 V pacing.  ICD interrogation 3/19 normal device function, NSVT.  CRI stable.  DM HGAIC at goal.  Lipids well controlled.  He seems improved.  States dyspnea back to baseline.  No chest pain sxs.   Lower extremity edema seems resolved.  He states his abdominal surgery few months ago went well.     Past Medical History:   Diagnosis Date    Arthritis     CAD (coronary artery disease)     Cataract     Chronic combined systolic and diastolic congestive heart failure 3/24/2015    CKD (chronic kidney disease), stage III     Diabetes mellitus type II      AM    Diabetic retinopathy     anti Veg F injections for macular edema    Diverticulitis of colon     ED (erectile dysfunction)     Encounter for blood transfusion     Glaucoma     HTN (hypertension)     Hyperlipidemia     Ischemic cardiomyopathy     Obesity     JAHAIRA on CPAP     Pacemaker     Pulmonary HTN        Current Outpatient Medications:     albuterol (VENTOLIN HFA) 90 mcg/actuation inhaler, Inhale 2 puffs into the lungs every 6 (six) hours as needed  "for Wheezing. Rescue, Disp: 1 Inhaler, Rfl: 6    allopurinol (ZYLOPRIM) 100 MG tablet, TAKE 1 TABLET (100 MG TOTAL) BY MOUTH ONCE DAILY., Disp: 90 tablet, Rfl: 0    aspirin (ECOTRIN) 81 MG EC tablet, Take 81 mg by mouth every morning. , Disp: , Rfl:     BD INSULIN SYRINGE ULTRA-FINE 0.5 mL 31 gauge x 5/16 Syrg, USE AS DIRECTED TO INJECT INSULIN TWO TIMES DAILY, Disp: 60 each, Rfl: 11    bimatoprost (LUMIGAN) 0.03 % ophthalmic drops, Place 1 drop into the left eye every evening., Disp: 2.5 mL, Rfl: 11    blood sugar diagnostic Strp, 1 strip by Misc.(Non-Drug; Combo Route) route 4 (four) times daily. Telcare, Disp: 120 strip, Rfl: 11    bumetanide (BUMEX) 2 MG tablet, MWF take 1 whole tab and on Tues, Thurs, Sat, Sun  Take 1/2 tab twice daily, Disp: 75 tablet, Rfl: 11    cholecalciferol, vitamin D3, (VITAMIN D3) 1,000 unit capsule, Take 1,000 Units by mouth once daily., Disp: , Rfl:     insulin glargine (LANTUS) 100 unit/mL injection, 50 units bid prn when BS< 150, Disp: 30 mL, Rfl: 11    IRON/VITAMIN B COMPLEX (GERITOL ORAL), Take by mouth., Disp: , Rfl:     lancets [facility] [place], Disp: 120 each, Rfl: 11    lisinopril (PRINIVIL,ZESTRIL) 5 MG tablet, TAKE 1 TABLET (5 MG TOTAL) BY MOUTH ONCE DAILY., Disp: 90 tablet, Rfl: 3    metOLazone (ZAROXOLYN) 5 MG tablet, Take 1 tablet (5 mg total) by mouth daily as needed., Disp: 30 tablet, Rfl: 0    nozaseptin (NOZIN) nasal , 1 each by Each Nare route 2 (two) times daily., Disp: 1 applicator, Rfl: 0    pantoprazole (PROTONIX) 40 MG tablet, TAKE 1 TABLET BY MOUTH EVERY DAY FOR ACID REFLUX, Disp: 90 tablet, Rfl: 1    pen needle, diabetic (BD ULTRA-FINE JOSLYN PEN NEEDLE) 32 gauge x 5/32" Ndle, 1 each by Misc.(Non-Drug; Combo Route) route 4 (four) times daily., Disp: 100 each, Rfl: 11    simvastatin (ZOCOR) 10 MG tablet, TAKE 1 TABLET (10 MG TOTAL) BY MOUTH EVERY EVENING., Disp: 30 tablet, Rfl: 7      Review of Systems   Constitution: Negative.   HENT: " "Negative.    Eyes: Negative.    Cardiovascular: Positive for dyspnea on exertion.   Respiratory: Positive for shortness of breath.    Endocrine: Negative.    Hematologic/Lymphatic: Negative.    Skin: Negative.    Musculoskeletal: Negative.    Gastrointestinal: Negative.    Genitourinary: Negative.    Neurological: Negative.    Psychiatric/Behavioral: Negative.    Allergic/Immunologic: Negative.        /74 (BP Location: Left arm, Patient Position: Sitting, BP Method: Large (Manual))   Pulse 88   Ht 5' 8" (1.727 m)   Wt 81.4 kg (179 lb 7.3 oz)   BMI 27.29 kg/m²     Wt Readings from Last 3 Encounters:   04/08/19 81.4 kg (179 lb 7.3 oz)   04/04/19 79.4 kg (175 lb 0.7 oz)   03/28/19 86.7 kg (191 lb 2.2 oz)     Temp Readings from Last 3 Encounters:   04/04/19 97.1 °F (36.2 °C) (Oral)   03/14/19 97.6 °F (36.4 °C)   02/11/19 98.2 °F (36.8 °C)     BP Readings from Last 3 Encounters:   04/08/19 116/74   04/04/19 (!) 95/55   03/28/19 138/88     Pulse Readings from Last 3 Encounters:   04/08/19 88   04/04/19 94   03/28/19 100          Objective:    Physical Exam   Constitutional: He is oriented to person, place, and time. He appears well-developed and well-nourished.   HENT:   Head: Normocephalic.   Neck: Normal range of motion. Neck supple. Normal carotid pulses, no hepatojugular reflux and no JVD present. Carotid bruit is not present. No thyromegaly present.   Cardiovascular: Normal rate, regular rhythm, S1 normal and S2 normal. PMI is not displaced. Exam reveals no S3, no S4, no distant heart sounds, no friction rub, no midsystolic click and no opening snap.   No murmur heard.  Pulses:       Radial pulses are 2+ on the right side, and 2+ on the left side.   Pulmonary/Chest: Effort normal and breath sounds normal. He has no wheezes. He has no rales.   Abdominal: Soft. Bowel sounds are normal. He exhibits no distension, no abdominal bruit, no ascites and no mass. There is no tenderness.   Musculoskeletal: He exhibits " no edema.   Neurological: He is alert and oriented to person, place, and time.   Skin: Skin is warm.   Psychiatric: He has a normal mood and affect. His behavior is normal.   Nursing note and vitals reviewed.      I have reviewed all pertinent labs and cardiac studies.    Component      Latest Ref Rng & Units 4/2/2019             BNP      0 - 99 pg/mL 2,054 (H)       Component      Latest Ref Rng & Units 4/3/2019             Troponin I      0.000 - 0.026 ng/mL 0.247 (H)       Lab Results   Component Value Date    HGBA1C 5.2 04/02/2019         Chemistry        Component Value Date/Time     (L) 04/04/2019 0357    K 3.9 04/04/2019 0357     04/04/2019 0357    CO2 26 04/04/2019 0357    BUN 25 (H) 04/04/2019 0357    CREATININE 1.7 (H) 04/04/2019 0357     04/04/2019 0357        Component Value Date/Time    CALCIUM 9.4 04/04/2019 0357    ALKPHOS 93 04/04/2019 0357    AST 27 04/04/2019 0357    ALT 15 04/04/2019 0357    BILITOT 0.8 04/04/2019 0357    ESTGFRAFRICA 48 (A) 04/04/2019 0357    EGFRNONAA 41 (A) 04/04/2019 0357        Lab Results   Component Value Date    WBC 5.57 04/04/2019    HGB 11.6 (L) 04/04/2019    HCT 35.4 (L) 04/04/2019    MCV 86 04/04/2019     04/04/2019     Lab Results   Component Value Date    HGBA1C 5.2 04/02/2019     Lab Results   Component Value Date    CHOL 91 (L) 11/16/2018    CHOL 105 (L) 05/02/2018    CHOL 105 (L) 05/02/2018     Lab Results   Component Value Date    HDL 21 (L) 11/16/2018    HDL 28 (L) 05/02/2018    HDL 28 (L) 05/02/2018     Lab Results   Component Value Date    LDLCALC 46.2 (L) 11/16/2018    LDLCALC 52.8 (L) 05/02/2018    LDLCALC 52.8 (L) 05/02/2018     Lab Results   Component Value Date    TRIG 119 11/16/2018    TRIG 121 05/02/2018    TRIG 121 05/02/2018     Lab Results   Component Value Date    CHOLHDL 23.1 11/16/2018    CHOLHDL 26.7 05/02/2018    CHOLHDL 26.7 05/02/2018     Narrative     Date of Procedure: 04/03/2019        TEST DESCRIPTION   Technical  Quality: This is a portable study performed at the patient's bedside. This is a technically challenging study. There is poor endocardial definition.     General: A catheter is present in the right-sided cardiac chambers.     Aorta: The aortic root is normal in size, measuring 3.3 cm at sinotubular junction and 3.5 cm at Sinuses of Valsalva. The proximal ascending aorta is normal in size, measuring 3.4 cm across.     Left Atrium: The left atrial volume index is normal, measuring 33.52 cc/m2.     Left Ventricle: The left ventricle is moderately enlarged, with an end-diastolic diameter of 6.3 cm, and an end-systolic diameter of 6.1 cm. LV wall thickness is normal, with the septum and the posterior wall each measuring 1.0 cm across. Relative wall   thickness was normal at 0.32, and the LV mass index was increased at 163.8 g/m2 consistent with eccentric left ventricular hypertrophy. There are no regional wall motion abnormalities. Left ventricular systolic function appears severely depressed.   Visually estimated ejection fraction is 15-20%. The LV Doppler derived stroke volume equals 45.0 ccs.     Diastolic indices:     Right Atrium: The right atrium is normal in size, measuring 5.8 cm in length and 3.8 cm in width in the apical view.     Right Ventricle: The right ventricle is moderately enlarged in size. Global right ventricular systolic function appears moderately depressed. Tricuspid annular plane systolic excursion (TAPSE) is 1.1 cm. The estimated PA systolic pressure is 77 mmHg.     Aortic Valve:  The peak gradient obtained across the aortic valve is 5 mmHg, with a mean gradient of 3 mmHg. Using a left ventricular outflow tract diameter of 2.0 cm, a left ventricular outflow tract velocity time integral of 14 cm, and a peak   instantaneous transvalvular velocity time integral of 19 cm, the calculated aortic valve area is 2.37 cm2(AVAi is 1.21 cm2/m2). Additionally, there is moderate aortic regurgitation. There is a  pressure half time of 371.0 msec.     Mitral Valve:  There is mild mitral regurgitation.     Tricuspid Valve:  There is mild to moderate tricuspid regurgitation.     IVC: IVC is enlarged and collapses < 50% with a sniff, suggesting high right atrial pressure of 15 mmHg.     Atrial Septum: The atrial septum is intact.     Intracavitary: There is no evidence of pericardial effusion, intracavity mass, thrombi, or vegetation.         CONCLUSIONS     1 - Moderate left ventricular enlargement.     2 - Eccentric hypertrophy.     3 - No wall motion abnormalities.     4 - Severely depressed left ventricular systolic function (EF 15-20%).     5 - Right ventricular enlargement with moderately depressed systolic function.     6 - Pulmonary hypertension. The estimated PA systolic pressure is 77 mmHg.     7 - Moderate aortic regurgitation.     8 - Mild mitral regurgitation.     9 - Mild to moderate tricuspid regurgitation.     10 - Increased central venous pressure.             This document has been electronically    SIGNED BY: Zayra Mercer MD On: 04/03/2019 11:24               Assessment:       1. Chronic combined systolic and diastolic congestive heart failure    2. Pulmonary HTN    3. Type 2 diabetes mellitus with stage 3 chronic kidney disease, with long-term current use of insulin    4. LBBB (left bundle branch block)    5. Ischemic cardiomyopathy    6. Obesity (BMI 30.0-34.9)    7. Nocturnal hypoxemia    8. Hyperlipidemia associated with type 2 diabetes mellitus    9. Hypertension associated with diabetes    10. Biventricular ICD (implantable cardioverter-defibrillator) in place    11. CKD (chronic kidney disease) stage 3, GFR 30-59 ml/min    12. Abnormal stress test    13. Acute on chronic combined systolic and diastolic congestive heart failure    14. Elevated troponin    15. Abnormal ECG    16. Nonrheumatic aortic valve insufficiency    17. NSVT (nonsustained ventricular tachycardia)         Plan:             S/p  hospitalization for acute on chronic CHF, now improved and seemingly back to baseline status.  Reviewed hospitalization results with pt.  Start Entresto 24/26 mg bid.  Stop Lisinopril x 2 days prior to starting Entresto.  Indications, side effects discussed in detail.  CMP, BNP in 1 week.  Continue current doses of diuretics.  Daily weights, low salt diet.  Stable other CV conditions.  Keep DM well controlled.  Stress MPI in future to reevaluate chronic CAD.  F/u 2 weeks for reevaluation.

## 2019-04-08 ENCOUNTER — OFFICE VISIT (OUTPATIENT)
Dept: INTERNAL MEDICINE | Facility: CLINIC | Age: 64
End: 2019-04-08
Payer: MEDICARE

## 2019-04-08 ENCOUNTER — OFFICE VISIT (OUTPATIENT)
Dept: CARDIOLOGY | Facility: CLINIC | Age: 64
End: 2019-04-08
Payer: MEDICARE

## 2019-04-08 ENCOUNTER — TELEPHONE (OUTPATIENT)
Dept: INTERNAL MEDICINE | Facility: CLINIC | Age: 64
End: 2019-04-08

## 2019-04-08 VITALS
HEART RATE: 104 BPM | SYSTOLIC BLOOD PRESSURE: 110 MMHG | OXYGEN SATURATION: 99 % | TEMPERATURE: 98 F | DIASTOLIC BLOOD PRESSURE: 74 MMHG | HEIGHT: 68 IN | BODY MASS INDEX: 27.1 KG/M2 | WEIGHT: 178.81 LBS

## 2019-04-08 VITALS
HEIGHT: 68 IN | DIASTOLIC BLOOD PRESSURE: 74 MMHG | WEIGHT: 179.44 LBS | HEART RATE: 88 BPM | BODY MASS INDEX: 27.19 KG/M2 | SYSTOLIC BLOOD PRESSURE: 116 MMHG

## 2019-04-08 DIAGNOSIS — E21.3 HYPERPARATHYROIDISM: ICD-10-CM

## 2019-04-08 DIAGNOSIS — Z23 NEED FOR PROPHYLACTIC VACCINATION WITH STREPTOCOCCUS PNEUMONIAE (PNEUMOCOCCUS) AND INFLUENZA VACCINES: ICD-10-CM

## 2019-04-08 DIAGNOSIS — I50.42 CHRONIC COMBINED SYSTOLIC AND DIASTOLIC CONGESTIVE HEART FAILURE: ICD-10-CM

## 2019-04-08 DIAGNOSIS — I15.2 HYPERTENSION ASSOCIATED WITH DIABETES: ICD-10-CM

## 2019-04-08 DIAGNOSIS — I25.5 ISCHEMIC CARDIOMYOPATHY: ICD-10-CM

## 2019-04-08 DIAGNOSIS — E11.59 HYPERTENSION ASSOCIATED WITH DIABETES: ICD-10-CM

## 2019-04-08 DIAGNOSIS — M1A.0790 CHRONIC IDIOPATHIC GOUT INVOLVING TOE WITHOUT TOPHUS, UNSPECIFIED LATERALITY: ICD-10-CM

## 2019-04-08 DIAGNOSIS — E55.9 VITAMIN D DEFICIENCY: ICD-10-CM

## 2019-04-08 DIAGNOSIS — G47.34 NOCTURNAL HYPOXEMIA: ICD-10-CM

## 2019-04-08 DIAGNOSIS — E11.22 TYPE 2 DIABETES MELLITUS WITH STAGE 3 CHRONIC KIDNEY DISEASE, WITH LONG-TERM CURRENT USE OF INSULIN: ICD-10-CM

## 2019-04-08 DIAGNOSIS — B37.2 CUTANEOUS CANDIDIASIS: Primary | ICD-10-CM

## 2019-04-08 DIAGNOSIS — I50.42 CHRONIC COMBINED SYSTOLIC AND DIASTOLIC CONGESTIVE HEART FAILURE: Primary | ICD-10-CM

## 2019-04-08 DIAGNOSIS — Z79.4 TYPE 2 DIABETES MELLITUS WITH STAGE 3 CHRONIC KIDNEY DISEASE, WITH LONG-TERM CURRENT USE OF INSULIN: ICD-10-CM

## 2019-04-08 DIAGNOSIS — I27.20 PULMONARY HTN: ICD-10-CM

## 2019-04-08 DIAGNOSIS — Z95.810 BIVENTRICULAR ICD (IMPLANTABLE CARDIOVERTER-DEFIBRILLATOR) IN PLACE: Chronic | ICD-10-CM

## 2019-04-08 DIAGNOSIS — I35.1 NONRHEUMATIC AORTIC VALVE INSUFFICIENCY: ICD-10-CM

## 2019-04-08 DIAGNOSIS — I47.29 NSVT (NONSUSTAINED VENTRICULAR TACHYCARDIA): ICD-10-CM

## 2019-04-08 DIAGNOSIS — E11.69 HYPERLIPIDEMIA ASSOCIATED WITH TYPE 2 DIABETES MELLITUS: ICD-10-CM

## 2019-04-08 DIAGNOSIS — N18.30 TYPE 2 DIABETES MELLITUS WITH STAGE 3 CHRONIC KIDNEY DISEASE, WITH LONG-TERM CURRENT USE OF INSULIN: ICD-10-CM

## 2019-04-08 DIAGNOSIS — N18.30 CKD (CHRONIC KIDNEY DISEASE) STAGE 3, GFR 30-59 ML/MIN: ICD-10-CM

## 2019-04-08 DIAGNOSIS — B35.1 ONYCHOMYCOSIS OF MULTIPLE TOENAILS WITH TYPE 2 DIABETES MELLITUS: ICD-10-CM

## 2019-04-08 DIAGNOSIS — D63.1 ANEMIA DUE TO STAGE 3 CHRONIC KIDNEY DISEASE: ICD-10-CM

## 2019-04-08 DIAGNOSIS — I50.43 ACUTE ON CHRONIC COMBINED SYSTOLIC AND DIASTOLIC CONGESTIVE HEART FAILURE: ICD-10-CM

## 2019-04-08 DIAGNOSIS — K21.9 GASTROESOPHAGEAL REFLUX DISEASE, ESOPHAGITIS PRESENCE NOT SPECIFIED: ICD-10-CM

## 2019-04-08 DIAGNOSIS — R94.31 ABNORMAL ECG: ICD-10-CM

## 2019-04-08 DIAGNOSIS — R94.39 ABNORMAL STRESS TEST: ICD-10-CM

## 2019-04-08 DIAGNOSIS — I44.7 LBBB (LEFT BUNDLE BRANCH BLOCK): ICD-10-CM

## 2019-04-08 DIAGNOSIS — R79.89 ELEVATED TROPONIN: ICD-10-CM

## 2019-04-08 DIAGNOSIS — E66.9 OBESITY (BMI 30.0-34.9): ICD-10-CM

## 2019-04-08 DIAGNOSIS — E78.5 HYPERLIPIDEMIA ASSOCIATED WITH TYPE 2 DIABETES MELLITUS: ICD-10-CM

## 2019-04-08 DIAGNOSIS — E11.69 ONYCHOMYCOSIS OF MULTIPLE TOENAILS WITH TYPE 2 DIABETES MELLITUS: ICD-10-CM

## 2019-04-08 DIAGNOSIS — N18.30 ANEMIA DUE TO STAGE 3 CHRONIC KIDNEY DISEASE: ICD-10-CM

## 2019-04-08 PROBLEM — I50.22 CHRONIC SYSTOLIC HEART FAILURE: Status: RESOLVED | Noted: 2018-03-06 | Resolved: 2019-04-08

## 2019-04-08 PROBLEM — I50.9 CONGESTIVE HEART FAILURE: Status: RESOLVED | Noted: 2019-04-02 | Resolved: 2019-04-08

## 2019-04-08 PROCEDURE — 99214 PR OFFICE/OUTPT VISIT, EST, LEVL IV, 30-39 MIN: ICD-10-PCS | Mod: 25,S$PBB,, | Performed by: FAMILY MEDICINE

## 2019-04-08 PROCEDURE — 99214 PR OFFICE/OUTPT VISIT, EST, LEVL IV, 30-39 MIN: ICD-10-PCS | Mod: S$PBB,,, | Performed by: INTERNAL MEDICINE

## 2019-04-08 PROCEDURE — 99213 OFFICE O/P EST LOW 20 MIN: CPT | Mod: PBBFAC,27,PN | Performed by: FAMILY MEDICINE

## 2019-04-08 PROCEDURE — 99214 OFFICE O/P EST MOD 30 MIN: CPT | Mod: 25,S$PBB,, | Performed by: FAMILY MEDICINE

## 2019-04-08 PROCEDURE — 99213 OFFICE O/P EST LOW 20 MIN: CPT | Mod: PBBFAC,PN,25 | Performed by: INTERNAL MEDICINE

## 2019-04-08 PROCEDURE — 99999 PR PBB SHADOW E&M-EST. PATIENT-LVL III: ICD-10-PCS | Mod: PBBFAC,,, | Performed by: INTERNAL MEDICINE

## 2019-04-08 PROCEDURE — 99999 PR PBB SHADOW E&M-EST. PATIENT-LVL III: ICD-10-PCS | Mod: PBBFAC,,, | Performed by: FAMILY MEDICINE

## 2019-04-08 PROCEDURE — G0009 ADMIN PNEUMOCOCCAL VACCINE: HCPCS | Mod: PBBFAC,PN

## 2019-04-08 PROCEDURE — 99214 OFFICE O/P EST MOD 30 MIN: CPT | Mod: S$PBB,,, | Performed by: INTERNAL MEDICINE

## 2019-04-08 PROCEDURE — 99999 PR PBB SHADOW E&M-EST. PATIENT-LVL III: CPT | Mod: PBBFAC,,, | Performed by: INTERNAL MEDICINE

## 2019-04-08 PROCEDURE — 99999 PR PBB SHADOW E&M-EST. PATIENT-LVL III: CPT | Mod: PBBFAC,,, | Performed by: FAMILY MEDICINE

## 2019-04-08 RX ORDER — NYSTATIN 100000 U/G
CREAM TOPICAL 2 TIMES DAILY
Qty: 30 G | Refills: 1 | Status: SHIPPED | OUTPATIENT
Start: 2019-04-08 | End: 2019-10-08

## 2019-04-08 RX ORDER — NYSTATIN AND TRIAMCINOLONE ACETONIDE 100000; 1 [USP'U]/G; MG/G
CREAM TOPICAL 4 TIMES DAILY
Qty: 30 G | Refills: 1 | Status: SHIPPED | OUTPATIENT
Start: 2019-04-08 | End: 2019-04-08

## 2019-04-08 NOTE — TELEPHONE ENCOUNTER
Pt's wife stated the mycolog cream is not covered by pt's  Insurance. Pt is requesting an alternative.

## 2019-04-08 NOTE — TELEPHONE ENCOUNTER
----- Message from Yolette Nogueira sent at 4/8/2019 12:28 PM CDT -----  Contact: Jessica- spouse  Elsie is calling to speak with nurse in regards to pt's medication. Elsie states the cream that was ordered for pt is not covered by insurance and would like to request alternate. Please call Elsie back at [phone].       Pt uses:    [facility]/pharm[facility] #[id number] - [place] - [address] [facility] AT AT [facility]  [address]  [place] [address]  Phone: [phone] Fax: [phone]      Thanks,   Yolette Nogueira

## 2019-04-08 NOTE — PROGRESS NOTES
Subjective:       Patient ID: Yan Choudhury is a 63 y.o. male.    Chief Complaint: Rash in Groin Area (pt c/o itching and burning )    63-year-old  male patient accompanied by his wife with Patient Active Problem List:     Hypertension associated with diabetes     ED (erectile dysfunction)     Ischemic cardiomyopathy     Pulmonary HTN     Hyperlipidemia associated with type 2 diabetes mellitus     Loss of eye - Right Eye     Abnormal ECG     LBBB (left bundle branch block)     Abnormal stress test     Chronic combined systolic and diastolic congestive heart failure     Cysts of eyelids     Primary open-angle glaucoma, moderate stage     Biventricular ICD (implantable cardioverter-defibrillator) in place     Anemia due to stage 3 chronic kidney disease     Type 2 diabetes mellitus with kidney complication, with long-term current use of insulin     Gastroesophageal reflux disease     Chronic gout without tophus     Vitamin D deficiency     Onychomycosis of multiple toenails with type 2 diabetes mellitus     Prosthetic eye globe     Screening for colon cancer     History of colostomy reversal     Nocturnal hypoxemia     Alteration in skin integrity     CKD (chronic kidney disease) stage 3, GFR 30-59 ml/min     Elevated troponin     Nonrheumatic aortic valve insufficiency     NSVT (nonsustained ventricular tachycardia)     Hyperparathyroidism  Here concerned about rash to bilateral groins, has been ongoing for the past 2 months and has been having itching.   Patient reports that his sugars has been stable and has been taking his medications regularly, has seen cardiologist today and has been doing well  Continues to use 2 L of oxygen at bedtime  Has been drinking adequate fluids  Denies any chest pain or difficulty breathing      Review of Systems   Constitutional: Negative for appetite change and fatigue.   Eyes: Negative for visual disturbance.   Respiratory: Negative for shortness of breath.   "  Cardiovascular: Negative for chest pain, palpitations and leg swelling.   Gastrointestinal: Negative for abdominal pain, nausea and vomiting.   Endocrine: Negative for polydipsia, polyphagia and polyuria.   Musculoskeletal: Negative for myalgias.   Skin: Positive for rash.   Neurological: Negative for weakness, numbness and headaches.   Psychiatric/Behavioral: Negative for sleep disturbance.         /74 (BP Location: Right arm, Patient Position: Sitting)   Pulse 104   Temp 97.7 °F (36.5 °C) (Tympanic)   Ht 5' 8" (1.727 m)   Wt 81.1 kg (178 lb 12.7 oz)   SpO2 99%   BMI 27.19 kg/m²   Objective:      Physical Exam   Constitutional: He is oriented to person, place, and time. He appears well-developed and well-nourished.   HENT:   Head: Normocephalic and atraumatic.   Mouth/Throat: Oropharynx is clear and moist.   Cardiovascular: Normal rate, regular rhythm and normal heart sounds.   No murmur heard.  Pulmonary/Chest: Effort normal and breath sounds normal. He has no wheezes.   Abdominal: Soft. Bowel sounds are normal. There is no tenderness.   Musculoskeletal: He exhibits no edema.   Neurological: He is alert and oriented to person, place, and time.   Skin: Skin is warm and dry. Rash noted.   Positive for hyperpigmented maculopapular rash noted to bilateral groins consistent with cutaneous candidiasis   Psychiatric: He has a normal mood and affect.       Admission on 04/02/2019, Discharged on 04/04/2019   Component Date Value Ref Range Status    WBC 04/02/2019 5.91  3.90 - 12.70 K/uL Final    RBC 04/02/2019 4.52* 4.60 - 6.20 M/uL Final    Hemoglobin 04/02/2019 12.9* 14.0 - 18.0 g/dL Final    Hematocrit 04/02/2019 39.4* 40.0 - 54.0 % Final    MCV 04/02/2019 87  82 - 98 fL Final    MCH 04/02/2019 28.5  27.0 - 31.0 pg Final    MCHC 04/02/2019 32.7  32.0 - 36.0 g/dL Final    RDW 04/02/2019 16.9* 11.5 - 14.5 % Final    Platelets 04/02/2019 193  150 - 350 K/uL Final    MPV 04/02/2019 11.4  9.2 - 12.9 " fL Final    Gran # (ANC) 04/02/2019 3.1  1.8 - 7.7 K/uL Final    Lymph # 04/02/2019 1.6  1.0 - 4.8 K/uL Final    Mono # 04/02/2019 0.8  0.3 - 1.0 K/uL Final    Eos # 04/02/2019 0.1  0.0 - 0.5 K/uL Final    Baso # 04/02/2019 0.01  0.00 - 0.20 K/uL Final    Gran% 04/02/2019 55.0  38.0 - 73.0 % Final    Lymph% 04/02/2019 28.8  18.0 - 48.0 % Final    Mono% 04/02/2019 13.7  4.0 - 15.0 % Final    Eosinophil% 04/02/2019 2.3  0.0 - 8.0 % Final    Basophil% 04/02/2019 0.2  0.0 - 1.9 % Final    Poik 04/02/2019 Slight   Final    Poly 04/02/2019 Occasional   Final    Ovalocytes 04/02/2019 Occasional   Final    Target Cells 04/02/2019 Occasional   Final    Tear Drop Cells 04/02/2019 Occasional   Final    Stomatocytes 04/02/2019 Present   Final    Spherocytes 04/02/2019 Occasional   Final    Differential Method 04/02/2019 Automated   Final    Sodium 04/02/2019 134* 136 - 145 mmol/L Final    Potassium 04/02/2019 4.6  3.5 - 5.1 mmol/L Final    Chloride 04/02/2019 101  95 - 110 mmol/L Final    CO2 04/02/2019 23  23 - 29 mmol/L Final    Glucose 04/02/2019 94  70 - 110 mg/dL Final    BUN, Bld 04/02/2019 23  8 - 23 mg/dL Final    Creatinine 04/02/2019 2.1* 0.5 - 1.4 mg/dL Final    Calcium 04/02/2019 9.4  8.7 - 10.5 mg/dL Final    Total Protein 04/02/2019 7.7  6.0 - 8.4 g/dL Final    Albumin 04/02/2019 3.3* 3.5 - 5.2 g/dL Final    Total Bilirubin 04/02/2019 0.8  0.1 - 1.0 mg/dL Final    Comment: For infants and newborns, interpretation of results should be based  on gestational age, weight and in agreement with clinical  observations.  Premature Infant recommended reference ranges:  Up to 24 hours.............<8.0 mg/dL  Up to 48 hours............<12.0 mg/dL  3-5 days..................<15.0 mg/dL  6-29 days.................<15.0 mg/dL      Alkaline Phosphatase 04/02/2019 109  55 - 135 U/L Final    AST 04/02/2019 41* 10 - 40 U/L Final    ALT 04/02/2019 21  10 - 44 U/L Final    Anion Gap 04/02/2019 10  8 -  16 mmol/L Final    eGFR if  04/02/2019 37* >60 mL/min/1.73 m^2 Final    eGFR if non African American 04/02/2019 32* >60 mL/min/1.73 m^2 Final    Comment: Calculation used to obtain the estimated glomerular filtration  rate (eGFR) is the CKD-EPI equation.       Troponin I 04/02/2019 0.357* 0.000 - 0.026 ng/mL Final    Comment: The reference interval for Troponin I represents the 99th percentile   cutoff   for our facility and is consistent with 3rd generation assay   performance.      BNP 04/02/2019 2,054* 0 - 99 pg/mL Final    Values of less than 100 pg/ml are consistent with non-CHF populations.    Prothrombin Time 04/02/2019 13.0* 9.0 - 12.5 sec Final    INR 04/02/2019 1.2  0.8 - 1.2 Final    Comment: Coumadin Therapy:  2.0 - 3.0 for INR for all indicators except mechanical heart valves  and antiphospholipid syndromes which should use 2.5 - 3.5.      Specimen UA 04/02/2019 Urine, Clean Catch   Final    Color, UA 04/02/2019 Yellow  Yellow, Straw, Polina Final    Appearance, UA 04/02/2019 Clear  Clear Final    pH, UA 04/02/2019 7.0  5.0 - 8.0 Final    Specific Gravity, UA 04/02/2019 1.010  1.005 - 1.030 Final    Protein, UA 04/02/2019 Negative  Negative Final    Comment: Recommend a 24 hour urine protein or a urine   protein/creatinine ratio if globulin induced proteinuria is  clinically suspected.      Glucose, UA 04/02/2019 Negative  Negative Final    Ketones, UA 04/02/2019 Negative  Negative Final    Bilirubin (UA) 04/02/2019 Negative  Negative Final    Occult Blood UA 04/02/2019 Negative  Negative Final    Nitrite, UA 04/02/2019 Negative  Negative Final    Urobilinogen, UA 04/02/2019 Negative  <2.0 EU/dL Final    Leukocytes, UA 04/02/2019 Negative  Negative Final    Hemoglobin A1C 04/02/2019 5.2  4.0 - 5.6 % Final    Comment: ADA Screening Guidelines:  5.7-6.4%  Consistent with prediabetes  >or=6.5%  Consistent with diabetes  High levels of fetal hemoglobin interfere with the  HbA1C  assay. Heterozygous hemoglobin variants (HbS, HgC, etc)do  not significantly interfere with this assay.   However, presence of multiple variants may affect accuracy.      Estimated Avg Glucose 04/02/2019 103  68 - 131 mg/dL Final    Troponin I 04/02/2019 0.268* 0.000 - 0.026 ng/mL Final    Comment: The reference interval for Troponin I represents the 99th percentile   cutoff   for our facility and is consistent with 3rd generation assay   performance.      POCT Glucose 04/02/2019 101  70 - 110 mg/dL Final    POCT Glucose 04/02/2019 112* 70 - 110 mg/dL Final    Troponin I 04/03/2019 0.247* 0.000 - 0.026 ng/mL Final    Comment: The reference interval for Troponin I represents the 99th percentile   cutoff   for our facility and is consistent with 3rd generation assay   performance.      WBC 04/03/2019 6.51  3.90 - 12.70 K/uL Final    RBC 04/03/2019 4.26* 4.60 - 6.20 M/uL Final    Hemoglobin 04/03/2019 12.1* 14.0 - 18.0 g/dL Final    Hematocrit 04/03/2019 37.4* 40.0 - 54.0 % Final    MCV 04/03/2019 88  82 - 98 fL Final    MCH 04/03/2019 28.4  27.0 - 31.0 pg Final    MCHC 04/03/2019 32.4  32.0 - 36.0 g/dL Final    RDW 04/03/2019 16.8* 11.5 - 14.5 % Final    Platelets 04/03/2019 213  150 - 350 K/uL Final    MPV 04/03/2019 12.0  9.2 - 12.9 fL Final    Gran # (ANC) 04/03/2019 2.7  1.8 - 7.7 K/uL Final    Lymph # 04/03/2019 2.5  1.0 - 4.8 K/uL Final    Mono # 04/03/2019 1.0  0.3 - 1.0 K/uL Final    Eos # 04/03/2019 0.3  0.0 - 0.5 K/uL Final    Baso # 04/03/2019 0.02  0.00 - 0.20 K/uL Final    Gran% 04/03/2019 40.9  38.0 - 73.0 % Final    Lymph% 04/03/2019 39.0  18.0 - 48.0 % Final    Mono% 04/03/2019 16.0* 4.0 - 15.0 % Final    Eosinophil% 04/03/2019 3.8  0.0 - 8.0 % Final    Basophil% 04/03/2019 0.3  0.0 - 1.9 % Final    Differential Method 04/03/2019 Automated   Final    Sodium 04/03/2019 135* 136 - 145 mmol/L Final    Potassium 04/03/2019 4.4  3.5 - 5.1 mmol/L Final    Chloride  04/03/2019 103  95 - 110 mmol/L Final    CO2 04/03/2019 22* 23 - 29 mmol/L Final    Glucose 04/03/2019 94  70 - 110 mg/dL Final    BUN, Bld 04/03/2019 26* 8 - 23 mg/dL Final    Creatinine 04/03/2019 1.9* 0.5 - 1.4 mg/dL Final    Calcium 04/03/2019 9.3  8.7 - 10.5 mg/dL Final    Total Protein 04/03/2019 6.6  6.0 - 8.4 g/dL Final    Albumin 04/03/2019 2.9* 3.5 - 5.2 g/dL Final    Total Bilirubin 04/03/2019 0.9  0.1 - 1.0 mg/dL Final    Comment: For infants and newborns, interpretation of results should be based  on gestational age, weight and in agreement with clinical  observations.  Premature Infant recommended reference ranges:  Up to 24 hours.............<8.0 mg/dL  Up to 48 hours............<12.0 mg/dL  3-5 days..................<15.0 mg/dL  6-29 days.................<15.0 mg/dL      Alkaline Phosphatase 04/03/2019 94  55 - 135 U/L Final    AST 04/03/2019 28  10 - 40 U/L Final    ALT 04/03/2019 17  10 - 44 U/L Final    Anion Gap 04/03/2019 10  8 - 16 mmol/L Final    eGFR if African American 04/03/2019 42* >60 mL/min/1.73 m^2 Final    eGFR if non African American 04/03/2019 36* >60 mL/min/1.73 m^2 Final    Comment: Calculation used to obtain the estimated glomerular filtration  rate (eGFR) is the CKD-EPI equation.       POCT Glucose 04/03/2019 107  70 - 110 mg/dL Final    QEF 04/03/2019 15* 55 - 65 Final    Mitral Valve Regurgitation 04/03/2019 MILD   Final    Aortic Valve Regurgitation 04/03/2019 MODERATE*  Final    Est. PA Systolic Pressure 04/03/2019 77.41*  Final    Tricuspid Valve Regurgitation 04/03/2019 MILD TO MODERATE   Final    POCT Glucose 04/03/2019 124* 70 - 110 mg/dL Final    POCT Glucose 04/03/2019 137* 70 - 110 mg/dL Final    WBC 04/04/2019 5.57  3.90 - 12.70 K/uL Final    RBC 04/04/2019 4.12* 4.60 - 6.20 M/uL Final    Hemoglobin 04/04/2019 11.6* 14.0 - 18.0 g/dL Final    Hematocrit 04/04/2019 35.4* 40.0 - 54.0 % Final    MCV 04/04/2019 86  82 - 98 fL Final    MCH  04/04/2019 28.2  27.0 - 31.0 pg Final    MCHC 04/04/2019 32.8  32.0 - 36.0 g/dL Final    RDW 04/04/2019 16.7* 11.5 - 14.5 % Final    Platelets 04/04/2019 203  150 - 350 K/uL Final    MPV 04/04/2019 11.2  9.2 - 12.9 fL Final    Gran # (ANC) 04/04/2019 2.5  1.8 - 7.7 K/uL Final    Lymph # 04/04/2019 2.0  1.0 - 4.8 K/uL Final    Mono # 04/04/2019 0.9  0.3 - 1.0 K/uL Final    Eos # 04/04/2019 0.2  0.0 - 0.5 K/uL Final    Baso # 04/04/2019 0.01  0.00 - 0.20 K/uL Final    Gran% 04/04/2019 45.3  38.0 - 73.0 % Final    Lymph% 04/04/2019 35.5  18.0 - 48.0 % Final    Mono% 04/04/2019 15.6* 4.0 - 15.0 % Final    Eosinophil% 04/04/2019 3.4  0.0 - 8.0 % Final    Basophil% 04/04/2019 0.2  0.0 - 1.9 % Final    Differential Method 04/04/2019 Automated   Final    Sodium 04/04/2019 135* 136 - 145 mmol/L Final    Potassium 04/04/2019 3.9  3.5 - 5.1 mmol/L Final    Chloride 04/04/2019 101  95 - 110 mmol/L Final    CO2 04/04/2019 26  23 - 29 mmol/L Final    Glucose 04/04/2019 105  70 - 110 mg/dL Final    BUN, Bld 04/04/2019 25* 8 - 23 mg/dL Final    Creatinine 04/04/2019 1.7* 0.5 - 1.4 mg/dL Final    Calcium 04/04/2019 9.4  8.7 - 10.5 mg/dL Final    Total Protein 04/04/2019 6.2  6.0 - 8.4 g/dL Final    Albumin 04/04/2019 2.6* 3.5 - 5.2 g/dL Final    Total Bilirubin 04/04/2019 0.8  0.1 - 1.0 mg/dL Final    Comment: For infants and newborns, interpretation of results should be based  on gestational age, weight and in agreement with clinical  observations.  Premature Infant recommended reference ranges:  Up to 24 hours.............<8.0 mg/dL  Up to 48 hours............<12.0 mg/dL  3-5 days..................<15.0 mg/dL  6-29 days.................<15.0 mg/dL      Alkaline Phosphatase 04/04/2019 93  55 - 135 U/L Final    AST 04/04/2019 27  10 - 40 U/L Final    ALT 04/04/2019 15  10 - 44 U/L Final    Anion Gap 04/04/2019 8  8 - 16 mmol/L Final    eGFR if  04/04/2019 48* >60 mL/min/1.73 m^2 Final     eGFR if non African American 04/04/2019 41* >60 mL/min/1.73 m^2 Final    Comment: Calculation used to obtain the estimated glomerular filtration  rate (eGFR) is the CKD-EPI equation.          Assessment:       1. Cutaneous candidiasis    2. Hypertension associated with diabetes    3. Type 2 diabetes mellitus with stage 3 chronic kidney disease, with long-term current use of insulin    4. Anemia due to stage 3 chronic kidney disease    5. Chronic idiopathic gout involving toe without tophus, unspecified laterality    6. CKD (chronic kidney disease) stage 3, GFR 30-59 ml/min    7. Vitamin D deficiency    8. Onychomycosis of multiple toenails with type 2 diabetes mellitus    9. Chronic combined systolic and diastolic congestive heart failure    10. Ischemic cardiomyopathy    11. Pulmonary HTN    12. Biventricular ICD (implantable cardioverter-defibrillator) in place    13. Hyperlipidemia associated with type 2 diabetes mellitus    14. Gastroesophageal reflux disease, esophagitis presence not specified    15. Hyperparathyroidism    16. Nocturnal hypoxemia    17. Need for prophylactic vaccination with Streptococcus pneumoniae (Pneumococcus) and Influenza vaccines        Plan:   Cutaneous candidiasis  Mycolog cream prescribed today to be applied 2-4 times daily  Patient was advised to keep area clean and dry    Hypertension associated with diabetes  Blood pressure is stable today currently on Entresto    Type 2 diabetes mellitus with stage 3 chronic kidney disease, with long-term current use of insulin  -     nystatin-triamcinolone (MYCOLOG II) cream; Apply topically 4 (four) times daily. Apply to bilateral groin  Dispense: 30 g; Refill: 1  Stable A1c on Lantus as needed    Anemia due to stage 3 chronic kidney disease  Chronic idiopathic gout involving toe without tophus, unspecified laterality  CKD (chronic kidney disease) stage 3, GFR 30-59 ml/min  Vitamin D deficiency  Hyperparathyroidism  Followed by Nephrology,  noted improvement in kidney functions    Onychomycosis of multiple toenails with type 2 diabetes mellitus    Chronic combined systolic and diastolic congestive heart failure  Ischemic cardiomyopathy  Pulmonary HTN  Biventricular ICD (implantable cardioverter-defibrillator) in place  As per Cardiology stable    Hyperlipidemia associated with type 2 diabetes mellitus  Currently on simvastatin 10 mg daily    Gastroesophageal reflux disease, esophagitis presence not specified    Nocturnal hypoxemia-on 2 L of oxygen at bedtime    Need for prophylactic vaccination with Streptococcus pneumoniae (Pneumococcus) and Influenza vaccines  -     (In Office Administered) Pneumococcal Polysaccharide Vaccine (23 Valent) (SQ/IM)  Pneumovax given today

## 2019-04-10 ENCOUNTER — DOCUMENTATION ONLY (OUTPATIENT)
Dept: RESEARCH | Facility: HOSPITAL | Age: 64
End: 2019-04-10

## 2019-04-10 ENCOUNTER — OFFICE VISIT (OUTPATIENT)
Dept: DERMATOLOGY | Facility: CLINIC | Age: 64
End: 2019-04-10
Payer: MEDICARE

## 2019-04-10 ENCOUNTER — TELEPHONE (OUTPATIENT)
Dept: RESEARCH | Facility: HOSPITAL | Age: 64
End: 2019-04-10

## 2019-04-10 DIAGNOSIS — L30.4 INTERTRIGO: Primary | ICD-10-CM

## 2019-04-10 PROCEDURE — 99203 OFFICE O/P NEW LOW 30 MIN: CPT | Mod: S$PBB,,, | Performed by: DERMATOLOGY

## 2019-04-10 PROCEDURE — 99999 PR PBB SHADOW E&M-EST. PATIENT-LVL II: CPT | Mod: PBBFAC,,, | Performed by: DERMATOLOGY

## 2019-04-10 PROCEDURE — 99212 OFFICE O/P EST SF 10 MIN: CPT | Mod: PBBFAC,PN | Performed by: DERMATOLOGY

## 2019-04-10 PROCEDURE — 99203 PR OFFICE/OUTPT VISIT, NEW, LEVL III, 30-44 MIN: ICD-10-PCS | Mod: S$PBB,,, | Performed by: DERMATOLOGY

## 2019-04-10 PROCEDURE — 99999 PR PBB SHADOW E&M-EST. PATIENT-LVL II: ICD-10-PCS | Mod: PBBFAC,,, | Performed by: DERMATOLOGY

## 2019-04-10 RX ORDER — HYDROXYZINE HYDROCHLORIDE 10 MG/1
10 TABLET, FILM COATED ORAL NIGHTLY PRN
Qty: 30 TABLET | Refills: 2 | Status: SHIPPED | OUTPATIENT
Start: 2019-04-10 | End: 2019-06-30 | Stop reason: SDUPTHER

## 2019-04-10 RX ORDER — TRIAMCINOLONE ACETONIDE 0.25 MG/G
CREAM TOPICAL 2 TIMES DAILY PRN
Qty: 80 G | Refills: 1 | Status: SHIPPED | OUTPATIENT
Start: 2019-04-10 | End: 2019-06-10 | Stop reason: SDUPTHER

## 2019-04-10 RX ORDER — KETOCONAZOLE 20 MG/G
CREAM TOPICAL
Qty: 60 G | Refills: 3 | Status: SHIPPED | OUTPATIENT
Start: 2019-04-10 | End: 2019-06-10 | Stop reason: SDUPTHER

## 2019-04-10 RX ORDER — MICONAZOLE NITRATE 2 %
POWDER (GRAM) TOPICAL
Qty: 85 G | Refills: 11 | Status: SHIPPED | OUTPATIENT
Start: 2019-04-10 | End: 2019-10-08

## 2019-04-10 NOTE — PROGRESS NOTES
Subjective:       Patient ID:  Yan Choudhury is a 63 y.o. male who presents for   Chief Complaint   Patient presents with    Itching     itching in groin      History of Present Illness: The patient presents with chief complaint of itching.  Location: groin  Duration: 2 months  Signs/Symptoms: itching    Prior treatments: cortisone-10, patrick's butt paste, OTC Athlete's Foot cream, nystatin cream      Itching         Review of Systems   Constitutional: Negative for fever and chills.   Gastrointestinal: Negative for nausea.   Skin: Positive for itching and rash. Negative for daily sunscreen use, activity-related sunscreen use and recent sunburn.   Hematologic/Lymphatic: Does not bruise/bleed easily.        Objective:    Physical Exam   Constitutional: He appears well-developed and well-nourished. No distress.   Neurological: He is alert and oriented to person, place, and time. He is not disoriented.   Psychiatric: He has a normal mood and affect.   Skin:   Areas Examined (abnormalities noted in diagram):   Head / Face Inspection Performed  Neck Inspection Performed  Chest / Axilla Inspection Performed  Abdomen Inspection Performed  Genitals / Buttocks / Groin Inspection Performed  Back Inspection Performed  RUE Inspected  LUE Inspection Performed  RLE Inspected  LLE Inspection Performed  Nails and Digits Inspection Performed                 Assessment / Plan:        Intertrigo  -     ketoconazole (NIZORAL) 2 % cream; AAA bid  Dispense: 60 g; Refill: 3  -     triamcinolone acetonide 0.025% (KENALOG) 0.025 % cream; Apply topically 2 (two) times daily as needed. Mild steroid  Dispense: 80 g; Refill: 1  -     miconazole NITRATE 2 % (ZEASORB AF) 2 % top powder; Use two to three times daily to prevent rash  Dispense: 85 g; Refill: 11  -     hydrOXYzine HCl (ATARAX) 10 MG Tab; Take 1 tablet (10 mg total) by mouth nightly as needed. May cause drowsiness.  Do not drive or operate heavy machinery.  Dispense: 30 tablet;  Refill: 2  -     Discussed diagnosis and AVS given on candidal intertrigo.  Will start ketoconazole cream, mixed 1:1 with TAC 0.025% cream and use BID.  Will also add miconazole powder TID.  Recommend frequent airing out of the inframammary, abdominal, groin and folds.               Follow up in about 6 weeks (around 5/22/2019) for follow up with JORGE Tierney.

## 2019-04-10 NOTE — TELEPHONE ENCOUNTER
Protocol #: [id number]  Subject ID: [id number]    Spoke with patient regarding [id number] study eligibility. Patient considered screen fail prior to Run-In visit due to Hgb value out of range for inclusion. Confirmed w/patient we can watch future lab values and screen up to 2 more times. Patient verbalized understanding.

## 2019-04-10 NOTE — PATIENT INSTRUCTIONS
Instructions for intertrigo:    · Use ketoconazole cream twice a day.  For flares only, mix ketoconazole cream with triamcinolone 0.025% cream (mild steroid cream) and use both twice a day when skin rash worsens and flares.    · Use miconazole powder or purchase over-the-counter Zeasorb-AF powder and use two to three times a day as needed to absorb sweat and prevent rash from worsening  · Frequently air out folds while at home (use a hand-held blow dryer set to cool)

## 2019-04-17 ENCOUNTER — LAB VISIT (OUTPATIENT)
Dept: LAB | Facility: HOSPITAL | Age: 64
End: 2019-04-17
Attending: INTERNAL MEDICINE
Payer: MEDICARE

## 2019-04-17 DIAGNOSIS — I50.42 CHRONIC COMBINED SYSTOLIC AND DIASTOLIC CONGESTIVE HEART FAILURE: ICD-10-CM

## 2019-04-17 LAB
ALBUMIN SERPL BCP-MCNC: 3 G/DL (ref 3.5–5.2)
ALP SERPL-CCNC: 101 U/L (ref 55–135)
ALT SERPL W/O P-5'-P-CCNC: 18 U/L (ref 10–44)
ANION GAP SERPL CALC-SCNC: 8 MMOL/L (ref 8–16)
AST SERPL-CCNC: 31 U/L (ref 10–40)
BILIRUB SERPL-MCNC: 0.6 MG/DL (ref 0.1–1)
BNP SERPL-MCNC: 1817 PG/ML (ref 0–99)
BUN SERPL-MCNC: 39 MG/DL (ref 8–23)
CALCIUM SERPL-MCNC: 9.8 MG/DL (ref 8.7–10.5)
CHLORIDE SERPL-SCNC: 100 MMOL/L (ref 95–110)
CO2 SERPL-SCNC: 24 MMOL/L (ref 23–29)
CREAT SERPL-MCNC: 2.1 MG/DL (ref 0.5–1.4)
EST. GFR  (AFRICAN AMERICAN): 37.1 ML/MIN/1.73 M^2
EST. GFR  (NON AFRICAN AMERICAN): 32.1 ML/MIN/1.73 M^2
GLUCOSE SERPL-MCNC: 128 MG/DL (ref 70–110)
POTASSIUM SERPL-SCNC: 5 MMOL/L (ref 3.5–5.1)
PROT SERPL-MCNC: 7.6 G/DL (ref 6–8.4)
SODIUM SERPL-SCNC: 132 MMOL/L (ref 136–145)

## 2019-04-17 PROCEDURE — 83880 ASSAY OF NATRIURETIC PEPTIDE: CPT

## 2019-04-17 PROCEDURE — 80053 COMPREHEN METABOLIC PANEL: CPT

## 2019-04-17 PROCEDURE — 36415 COLL VENOUS BLD VENIPUNCTURE: CPT

## 2019-04-23 ENCOUNTER — OFFICE VISIT (OUTPATIENT)
Dept: CARDIOLOGY | Facility: CLINIC | Age: 64
End: 2019-04-23
Payer: MEDICARE

## 2019-04-23 VITALS
DIASTOLIC BLOOD PRESSURE: 58 MMHG | SYSTOLIC BLOOD PRESSURE: 100 MMHG | HEIGHT: 68 IN | HEART RATE: 64 BPM | BODY MASS INDEX: 26.36 KG/M2 | WEIGHT: 173.94 LBS

## 2019-04-23 DIAGNOSIS — N18.30 TYPE 2 DIABETES MELLITUS WITH STAGE 3 CHRONIC KIDNEY DISEASE, WITH LONG-TERM CURRENT USE OF INSULIN: ICD-10-CM

## 2019-04-23 DIAGNOSIS — N18.30 CKD (CHRONIC KIDNEY DISEASE) STAGE 3, GFR 30-59 ML/MIN: ICD-10-CM

## 2019-04-23 DIAGNOSIS — I27.20 PULMONARY HTN: ICD-10-CM

## 2019-04-23 DIAGNOSIS — I25.10 CAD, MULTIPLE VESSEL: ICD-10-CM

## 2019-04-23 DIAGNOSIS — R94.39 ABNORMAL STRESS TEST: ICD-10-CM

## 2019-04-23 DIAGNOSIS — I47.29 NSVT (NONSUSTAINED VENTRICULAR TACHYCARDIA): ICD-10-CM

## 2019-04-23 DIAGNOSIS — I50.42 CHRONIC COMBINED SYSTOLIC AND DIASTOLIC CONGESTIVE HEART FAILURE: Primary | ICD-10-CM

## 2019-04-23 DIAGNOSIS — I44.7 LBBB (LEFT BUNDLE BRANCH BLOCK): ICD-10-CM

## 2019-04-23 DIAGNOSIS — E78.5 HYPERLIPIDEMIA ASSOCIATED WITH TYPE 2 DIABETES MELLITUS: ICD-10-CM

## 2019-04-23 DIAGNOSIS — I15.2 HYPERTENSION ASSOCIATED WITH DIABETES: ICD-10-CM

## 2019-04-23 DIAGNOSIS — E11.69 HYPERLIPIDEMIA ASSOCIATED WITH TYPE 2 DIABETES MELLITUS: ICD-10-CM

## 2019-04-23 DIAGNOSIS — E11.59 HYPERTENSION ASSOCIATED WITH DIABETES: ICD-10-CM

## 2019-04-23 DIAGNOSIS — E11.22 TYPE 2 DIABETES MELLITUS WITH STAGE 3 CHRONIC KIDNEY DISEASE, WITH LONG-TERM CURRENT USE OF INSULIN: ICD-10-CM

## 2019-04-23 DIAGNOSIS — I35.1 NONRHEUMATIC AORTIC VALVE INSUFFICIENCY: ICD-10-CM

## 2019-04-23 DIAGNOSIS — I25.5 ISCHEMIC CARDIOMYOPATHY: ICD-10-CM

## 2019-04-23 DIAGNOSIS — Z79.4 TYPE 2 DIABETES MELLITUS WITH STAGE 3 CHRONIC KIDNEY DISEASE, WITH LONG-TERM CURRENT USE OF INSULIN: ICD-10-CM

## 2019-04-23 DIAGNOSIS — Z95.810 BIVENTRICULAR ICD (IMPLANTABLE CARDIOVERTER-DEFIBRILLATOR) IN PLACE: Chronic | ICD-10-CM

## 2019-04-23 DIAGNOSIS — R94.31 ABNORMAL ECG: ICD-10-CM

## 2019-04-23 PROCEDURE — 99214 PR OFFICE/OUTPT VISIT, EST, LEVL IV, 30-39 MIN: ICD-10-PCS | Mod: S$PBB,,, | Performed by: INTERNAL MEDICINE

## 2019-04-23 PROCEDURE — 99214 OFFICE O/P EST MOD 30 MIN: CPT | Mod: S$PBB,,, | Performed by: INTERNAL MEDICINE

## 2019-04-23 PROCEDURE — 99999 PR PBB SHADOW E&M-EST. PATIENT-LVL III: ICD-10-PCS | Mod: PBBFAC,,, | Performed by: INTERNAL MEDICINE

## 2019-04-23 PROCEDURE — 99213 OFFICE O/P EST LOW 20 MIN: CPT | Mod: PBBFAC,PN | Performed by: INTERNAL MEDICINE

## 2019-04-23 PROCEDURE — 99999 PR PBB SHADOW E&M-EST. PATIENT-LVL III: CPT | Mod: PBBFAC,,, | Performed by: INTERNAL MEDICINE

## 2019-04-23 NOTE — PROGRESS NOTES
Subjective:    Patient ID:  Yan Choudhury is a 63 y.o. male who presents for evaluation of Coronary Artery Disease; Congestive Heart Failure; Risk Factor Management For Atherosclerosis; and Hyperlipidemia      HPI Pt presents for fu.  His current medical conditions include CHF, CAD/ICM, DCM, LBBB, HTN, CRI, DM, CRT-ICD, PTHN, dyslipidemia, obesity. Nonsmoker.   Past hx pertinent for following:  LHC was done 2009 showed diffuse CAD, and severe distal CAD involving apical LAD, distal RCA/distal LCX.   Echo Oct 2010 showed LVEF 25%.   PET stress 2016 showed inferior scar, no significant ischemia noted and LVEF < 35%.   s/p BIV ICD 3/16 for CHF/LBBB.  S/p admission 4/19 for acute on chronic CHF exacerbation.  Echo April 19 EF 15 - 20%, LVE, mod AI, mild-mod TR, mild MR, severe PHTN 77 mmHg.  BNP 4/19 markedly more elevated.  Troponin + 4/19, higher than normal.  ecg 4/2/19 V pacing.  ICD interrogation 3/19 normal device function, NSVT.  Now here.  Pt seen 2 weeks ago.  entresto started for CHF and lisinopril stopped.  He states he has a cold.  + cough, nonproductive, + congestion.  No fevers.  CHF seems stable.  He has stable LIM, unchanged.  Denies pnd/orthopnea.  CAD seems stable. No active anginal sxs on current medical tx.  Labs shows his BNP to be significantly elevated but still trending down from his recent admit.  CRI stable on labs.  BP on low side as usual.  Lipids well controlled, on statin.  ICD has not fired.  Compliant with meds and low salt diet.  Weight is stable.   No leg edema.  DM HGAIC at goal.,      Current Outpatient Medications:     albuterol (VENTOLIN HFA) 90 mcg/actuation inhaler, Inhale 2 puffs into the lungs every 6 (six) hours as needed for Wheezing. Rescue, Disp: 1 Inhaler, Rfl: 6    allopurinol (ZYLOPRIM) 100 MG tablet, TAKE 1 TABLET (100 MG TOTAL) BY MOUTH ONCE DAILY., Disp: 90 tablet, Rfl: 0    aspirin (ECOTRIN) 81 MG EC tablet, Take 81 mg by mouth every morning. , Disp: , Rfl:     BD  "INSULIN SYRINGE ULTRA-FINE 0.5 mL 31 gauge x 5/16 Syrg, USE AS DIRECTED TO INJECT INSULIN TWO TIMES DAILY, Disp: 60 each, Rfl: 11    bimatoprost (LUMIGAN) 0.03 % ophthalmic drops, Place 1 drop into the left eye every evening., Disp: 2.5 mL, Rfl: 11    blood sugar diagnostic Strp, 1 strip by Misc.(Non-Drug; Combo Route) route 4 (four) times daily. Telcare, Disp: 120 strip, Rfl: 11    bumetanide (BUMEX) 2 MG tablet, MWF take 1 whole tab and on Tues, Thurs, Sat, Sun  Take 1/2 tab twice daily, Disp: 75 tablet, Rfl: 11    cholecalciferol, vitamin D3, (VITAMIN D3) 1,000 unit capsule, Take 1,000 Units by mouth once daily., Disp: , Rfl:     hydrOXYzine HCl (ATARAX) 10 MG Tab, Take 1 tablet (10 mg total) by mouth nightly as needed. May cause drowsiness.  Do not drive or operate heavy machinery., Disp: 30 tablet, Rfl: 2    insulin glargine (LANTUS) 100 unit/mL injection, 50 units bid prn when BS< 150, Disp: 30 mL, Rfl: 11    IRON/VITAMIN B COMPLEX (GERITOL ORAL), Take by mouth., Disp: , Rfl:     ketoconazole (NIZORAL) 2 % cream, AAA bid, Disp: 60 g, Rfl: 3    lancets [facility], For tel care, Disp: 120 each, Rfl: 11    metOLazone (ZAROXOLYN) 5 MG tablet, Take 1 tablet (5 mg total) by mouth daily as needed., Disp: 30 tablet, Rfl: 0    miconazole NITRATE 2 % (ZEASORB AF) 2 % top powder, Use two to three times daily to prevent rash, Disp: 85 g, Rfl: 11    nystatin (MYCOSTATIN) cream, Apply topically 2 (two) times daily. Apply to bilateral groins, Disp: 30 g, Rfl: 1    pantoprazole (PROTONIX) 40 MG tablet, TAKE 1 TABLET BY MOUTH EVERY DAY FOR ACID REFLUX, Disp: 90 tablet, Rfl: 1    pen needle, diabetic (BD ULTRA-FINE JOSLYN PEN NEEDLE) 32 gauge x 5/32" Ndle, 1 each by Misc.(Non-Drug; Combo Route) route 4 (four) times daily., Disp: 100 each, Rfl: 11    sacubitril-valsartan (ENTRESTO) 24-26 mg per tablet, Take 1 tablet by mouth 2 (two) times daily., Disp: 60 tablet, Rfl: 12    simvastatin (ZOCOR) 10 MG tablet, TAKE 1 TABLET " "(10 MG TOTAL) BY MOUTH EVERY EVENING., Disp: 30 tablet, Rfl: 7    triamcinolone acetonide 0.025% (KENALOG) 0.025 % cream, Apply topically 2 (two) times daily as needed. Mild steroid, Disp: 80 g, Rfl: 1     Review of Systems   Constitution: Negative.   HENT: Positive for congestion.    Eyes: Negative.    Cardiovascular: Positive for dyspnea on exertion.   Respiratory: Positive for cough and shortness of breath.    Endocrine: Negative.    Hematologic/Lymphatic: Negative.    Skin: Negative.    Musculoskeletal: Negative.    Gastrointestinal: Negative.    Genitourinary: Negative.    Neurological: Negative.    Psychiatric/Behavioral: Negative.    Allergic/Immunologic: Negative.        BP (!) 100/58 (BP Location: Left arm, Patient Position: Sitting, BP Method: Medium (Manual))   Pulse 64   Ht 5' 8" (1.727 m)   Wt 78.9 kg (173 lb 15.1 oz)   BMI 26.45 kg/m²     Wt Readings from Last 3 Encounters:   04/23/19 78.9 kg (173 lb 15.1 oz)   04/08/19 81.1 kg (178 lb 12.7 oz)   04/08/19 81.4 kg (179 lb 7.3 oz)     Temp Readings from Last 3 Encounters:   04/08/19 97.7 °F (36.5 °C) (Tympanic)   04/04/19 97.1 °F (36.2 °C) (Oral)   03/14/19 97.6 °F (36.4 °C)     BP Readings from Last 3 Encounters:   04/23/19 (!) 100/58   04/08/19 110/74   04/08/19 116/74     Pulse Readings from Last 3 Encounters:   04/23/19 64   04/08/19 104   04/08/19 88          Objective:    Physical Exam   Constitutional: He is oriented to person, place, and time. He appears well-developed and well-nourished.   HENT:   Head: Normocephalic.   Neck: Normal range of motion. Neck supple. Normal carotid pulses, no hepatojugular reflux and no JVD present. Carotid bruit is not present. No thyromegaly present.   Cardiovascular: Normal rate, regular rhythm, S1 normal and S2 normal. PMI is not displaced. Exam reveals no S3, no S4, no distant heart sounds, no friction rub, no midsystolic click and no opening snap.   No murmur heard.  Pulses:       Radial pulses are 2+ on " the right side, and 2+ on the left side.   Pulmonary/Chest: Effort normal and breath sounds normal. He has no wheezes. He has no rales.   Abdominal: Soft. Bowel sounds are normal. He exhibits no distension, no abdominal bruit, no ascites and no mass. There is no tenderness.   Musculoskeletal: He exhibits no edema.   Neurological: He is alert and oriented to person, place, and time.   Skin: Skin is warm.   Psychiatric: He has a normal mood and affect. His behavior is normal.   Nursing note and vitals reviewed.      I have reviewed all pertinent labs and cardiac studies.    Component      Latest Ref Rng & Units 4/17/2019             BNP      0 - 99 pg/mL 1,817 (H)         Chemistry        Component Value Date/Time     (L) 04/17/2019 1119    K 5.0 04/17/2019 1119     04/17/2019 1119    CO2 24 04/17/2019 1119    BUN 39 (H) 04/17/2019 1119    CREATININE 2.1 (H) 04/17/2019 1119     (H) 04/17/2019 1119        Component Value Date/Time    CALCIUM 9.8 04/17/2019 1119    ALKPHOS 101 04/17/2019 1119    AST 31 04/17/2019 1119    ALT 18 04/17/2019 1119    BILITOT 0.6 04/17/2019 1119    ESTGFRAFRICA 37.1 (A) 04/17/2019 1119    EGFRNONAA 32.1 (A) 04/17/2019 1119        Lab Results   Component Value Date    WBC 5.57 04/04/2019    HGB 11.6 (L) 04/04/2019    HCT 35.4 (L) 04/04/2019    MCV 86 04/04/2019     04/04/2019     Lab Results   Component Value Date    HGBA1C 5.2 04/02/2019     Lab Results   Component Value Date    CHOL 91 (L) 11/16/2018    CHOL 105 (L) 05/02/2018    CHOL 105 (L) 05/02/2018     Lab Results   Component Value Date    HDL 21 (L) 11/16/2018    HDL 28 (L) 05/02/2018    HDL 28 (L) 05/02/2018     Lab Results   Component Value Date    LDLCALC 46.2 (L) 11/16/2018    LDLCALC 52.8 (L) 05/02/2018    LDLCALC 52.8 (L) 05/02/2018     Lab Results   Component Value Date    TRIG 119 11/16/2018    TRIG 121 05/02/2018    TRIG 121 05/02/2018     Lab Results   Component Value Date    CHOLHDL 23.1 11/16/2018     CHOLL 26.7 05/02/2018    CHOLL 26.7 05/02/2018           Assessment:       1. Chronic combined systolic and diastolic congestive heart failure    2. Hypertension associated with diabetes    3. Ischemic cardiomyopathy    4. Pulmonary HTN    5. Hyperlipidemia associated with type 2 diabetes mellitus    6. Abnormal ECG    7. LBBB (left bundle branch block)    8. Abnormal stress test    9. Type 2 diabetes mellitus with stage 3 chronic kidney disease, with long-term current use of insulin    10. NSVT (nonsustained ventricular tachycardia)    11. Nonrheumatic aortic valve insufficiency    12. Biventricular ICD (implantable cardioverter-defibrillator) in place    13. CKD (chronic kidney disease) stage 3, GFR 30-59 ml/min    14. CAD, multiple vessel         Plan:             Stable CV conditions on current medical tx.  Continue current dose of Entresto -- cannot tolerate higher dose right now.  Continue OMT for CAD/CHF.  Cardiac low salt diet.  Continue current doses of diuretics -- extra prn for chf sxs/weight gain.  Daily weights  Fluid restriction 1 - 1.5 liter day.  Continue f/u in ICD clinic.  Pt directed to PCP and/or urgent care clinic for cold sxs.  Keep DM well controlled.  F/u 2 months with CMP, BNP + stress MPI.

## 2019-04-24 ENCOUNTER — HOSPITAL ENCOUNTER (OUTPATIENT)
Dept: RADIOLOGY | Facility: HOSPITAL | Age: 64
Discharge: HOME | End: 2019-04-24
Attending: NURSE PRACTITIONER
Payer: MEDICARE

## 2019-04-24 ENCOUNTER — OFFICE VISIT (OUTPATIENT)
Dept: URGENT CARE | Facility: CLINIC | Age: 64
End: 2019-04-24
Payer: MEDICARE

## 2019-04-24 VITALS
WEIGHT: 171.94 LBS | HEART RATE: 105 BPM | HEIGHT: 68 IN | DIASTOLIC BLOOD PRESSURE: 62 MMHG | BODY MASS INDEX: 26.06 KG/M2 | OXYGEN SATURATION: 99 % | SYSTOLIC BLOOD PRESSURE: 93 MMHG | TEMPERATURE: 100 F | RESPIRATION RATE: 24 BRPM

## 2019-04-24 DIAGNOSIS — J22 BACTERIAL LOWER RESPIRATORY INFECTION: ICD-10-CM

## 2019-04-24 DIAGNOSIS — R50.9 FEVER, UNSPECIFIED FEVER CAUSE: Primary | ICD-10-CM

## 2019-04-24 DIAGNOSIS — R50.9 FEVER, UNSPECIFIED FEVER CAUSE: ICD-10-CM

## 2019-04-24 DIAGNOSIS — B96.89 BACTERIAL LOWER RESPIRATORY INFECTION: ICD-10-CM

## 2019-04-24 LAB
CTP QC/QA: YES
POC MOLECULAR INFLUENZA A AGN: NEGATIVE
POC MOLECULAR INFLUENZA B AGN: NEGATIVE

## 2019-04-24 PROCEDURE — 71046 X-RAY EXAM CHEST 2 VIEWS: CPT | Mod: TC

## 2019-04-24 PROCEDURE — 99215 OFFICE O/P EST HI 40 MIN: CPT | Mod: PBBFAC,25,PN | Performed by: NURSE PRACTITIONER

## 2019-04-24 PROCEDURE — 99214 PR OFFICE/OUTPT VISIT, EST, LEVL IV, 30-39 MIN: ICD-10-PCS | Mod: S$PBB,,, | Performed by: NURSE PRACTITIONER

## 2019-04-24 PROCEDURE — 71046 XR CHEST PA AND LATERAL: ICD-10-PCS | Mod: 26,,, | Performed by: RADIOLOGY

## 2019-04-24 PROCEDURE — 99999 PR PBB SHADOW E&M-EST. PATIENT-LVL V: CPT | Mod: PBBFAC,,, | Performed by: NURSE PRACTITIONER

## 2019-04-24 PROCEDURE — 99214 OFFICE O/P EST MOD 30 MIN: CPT | Mod: S$PBB,,, | Performed by: NURSE PRACTITIONER

## 2019-04-24 PROCEDURE — 99999 PR PBB SHADOW E&M-EST. PATIENT-LVL V: ICD-10-PCS | Mod: PBBFAC,,, | Performed by: NURSE PRACTITIONER

## 2019-04-24 PROCEDURE — 71046 X-RAY EXAM CHEST 2 VIEWS: CPT | Mod: 26,,, | Performed by: RADIOLOGY

## 2019-04-24 PROCEDURE — 87502 INFLUENZA DNA AMP PROBE: CPT | Mod: PBBFAC,PN | Performed by: NURSE PRACTITIONER

## 2019-04-24 RX ORDER — GUAIFENESIN 600 MG/1
600 TABLET, EXTENDED RELEASE ORAL 2 TIMES DAILY PRN
Qty: 30 TABLET | Refills: 0 | Status: SHIPPED | OUTPATIENT
Start: 2019-04-24 | End: 2019-10-08

## 2019-04-24 RX ORDER — BENZONATATE 200 MG/1
200 CAPSULE ORAL 3 TIMES DAILY PRN
Qty: 30 CAPSULE | Refills: 0 | Status: SHIPPED | OUTPATIENT
Start: 2019-04-24 | End: 2019-07-22

## 2019-04-24 RX ORDER — DOXYCYCLINE 100 MG/1
100 CAPSULE ORAL 2 TIMES DAILY
Qty: 20 CAPSULE | Refills: 0 | Status: SHIPPED | OUTPATIENT
Start: 2019-04-24 | End: 2019-10-08 | Stop reason: ALTCHOICE

## 2019-04-24 RX ORDER — CODEINE PHOSPHATE AND GUAIFENESIN 10; 100 MG/5ML; MG/5ML
5 SOLUTION ORAL EVERY 6 HOURS PRN
Qty: 180 ML | Refills: 0 | Status: CANCELLED | OUTPATIENT
Start: 2019-04-24 | End: 2019-05-04

## 2019-04-24 NOTE — PROGRESS NOTES
"Subjective:      Patient ID: Yan Choudhury is a 63 y.o. male.    Chief Complaint: Cough and Fever    Cough   This is a new problem. Episode onset: 1 week. The problem has been gradually worsening. The problem occurs constantly. The cough is productive of sputum. Associated symptoms include a fever, nasal congestion, shortness of breath and wheezing. Pertinent negatives include no myalgias or sore throat. Treatments tried: mucinex, cough drops. The treatment provided no relief. His past medical history is significant for pneumonia.   Fever    Associated symptoms include congestion, coughing and wheezing. Pertinent negatives include no sore throat.     Review of Systems   Constitutional: Positive for fatigue and fever.   HENT: Positive for congestion. Negative for sore throat.    Respiratory: Positive for cough, shortness of breath and wheezing.    Cardiovascular: Negative.    Gastrointestinal: Negative.    Musculoskeletal: Negative.  Negative for myalgias.   Neurological: Negative.        Objective:   BP 93/62   Pulse 105   Temp 100 °F (37.8 °C) (Tympanic)   Resp (!) 24   Ht 5' 8" (1.727 m)   Wt 78 kg (171 lb 15.3 oz)   SpO2 99%   BMI 26.15 kg/m²   Physical Exam   Constitutional: He is oriented to person, place, and time. He appears well-developed and well-nourished. He is cooperative. He appears ill. No distress.   HENT:   Head: Normocephalic and atraumatic.   Right Ear: Tympanic membrane normal.   Left Ear: Tympanic membrane normal.   Nose: Mucosal edema present. No sinus tenderness.   Mouth/Throat: Uvula is midline, oropharynx is clear and moist and mucous membranes are normal.   Neck: Trachea normal, normal range of motion and phonation normal. Neck supple.   Cardiovascular: Regular rhythm, S1 normal, S2 normal and normal pulses. Tachycardia present.   Pulmonary/Chest: Effort normal. No accessory muscle usage. No tachypnea. No respiratory distress. He has no decreased breath sounds. He has no wheezes. He " has rales in the right lower field.   Musculoskeletal: Normal range of motion.   Neurological: He is alert and oriented to person, place, and time.   Skin: Skin is warm and dry. No rash noted. He is not diaphoretic.   Nursing note and vitals reviewed.    Assessment:      1. Fever, unspecified fever cause    2. Bacterial lower respiratory infection       Plan:   Fever, unspecified fever cause  -     POCT Influenza A/B Molecular  -     X-Ray Chest PA And Lateral; Future; Expected date: 04/24/2019    Bacterial lower respiratory infection  -     doxycycline (MONODOX) 100 MG capsule; Take 1 capsule (100 mg total) by mouth 2 (two) times daily. for 10 days  Dispense: 20 capsule; Refill: 0  -     benzonatate (TESSALON) 200 MG capsule; Take 1 capsule (200 mg total) by mouth 3 (three) times daily as needed for Cough.  Dispense: 30 capsule; Refill: 0  -     guaiFENesin (MUCINEX) 600 mg 12 hr tablet; Take 1 tablet (600 mg total) by mouth 2 (two) times daily as needed for Congestion.  Dispense: 30 tablet; Refill: 0    Negative flu.  No pneumonia or evidence of CHF exacerbation on x-ray.  Will go ahead and cover with antibiotics due to presence of cough x 1 week with fever x 2 days and history of PNA in the past.   Adivsed close follow up with primary care within the next 24-48 hours for recheck. ER for any worsening at all.  Reviewed plan of care with  And Serenity Kei. They both agree with plan and verbalize understanding of instructions.

## 2019-04-24 NOTE — PATIENT INSTRUCTIONS
Follow up in 24-48 hours with primary care for recheck. ER for any worsening at all.  · Rest and ensure adequate hydration.   · Saline nasal spray or saline irrigation (Neti pot) to loosen nasal congestion.  · Flonase or Nasacort to reduce inflammation in the sinus cavities.  · Take antibiotics exactly as prescribed. Make sure to complete the entire course of antibiotics even if you start feeling better. This will prevent recurrence of your infection and bacterial resistance.   · Follow up with your primary care provider or with ENT if not improved within a few days or sooner for any new or worsening symptoms.   · Go to the ER for any fever that does not improve with Tylenol/Ibuprofen, neck stiffness, rash, severe headache, vision changes, shortness of breath, chest pain, severe facial pain or swelling, or for any other new and concerning symptoms.     Bronchitis, Antibiotic Treatment (Adult)    Bronchitis is an infection of the air passages (bronchial tubes) in your lungs. It often occurs when you have a cold. This illness is contagious during the first few days and is spread through the air by coughing and sneezing, or by direct contact (touching the sick person and then touching your own eyes, nose, or mouth).  Symptoms of bronchitis include cough with mucus (phlegm) and low-grade fever. Bronchitis usually lasts 7 to 14 days. Mild cases can be treated with simple home remedies. More severe infection is treated with an antibiotic.  Home care  Follow these guidelines when caring for yourself at home:  · If your symptoms are severe, rest at home for the first 2 to 3 days. When you go back to your usual activities, don't let yourself get too tired.  · Do not smoke. Also avoid being exposed to secondhand smoke.  · You may use over-the-counter medicines to control fever or pain, unless another medicine was prescribed. (Note: If you have chronic liver or kidney disease or have ever had a stomach ulcer or  gastrointestinal bleeding, talk with your healthcare provider before using these medicines. Also talk to your provider if you are taking medicine to prevent blood clots.) Aspirin should never be given to anyone younger than 18 years of age who is ill with a viral infection or fever. It may cause severe liver or brain damage.  · Your appetite may be poor, so a light diet is fine. Avoid dehydration by drinking 6 to 8 glasses of fluids per day (such as water, soft drinks, sports drinks, juices, tea, or soup). Extra fluids will help loosen secretions in the nose and lungs.  · Over-the-counter cough, cold, and sore-throat medicines will not shorten the length of the illness, but they may be helpful to reduce symptoms. (Note: Do not use decongestants if you have high blood pressure.)  · Finish all antibiotic medicine. Do this even if you are feeling better after only a few days.  Follow-up care  Follow up with your healthcare provider, or as advised. If you had an X-ray or ECG (electrocardiogram), a specialist will review it. You will be notified of any new findings that may affect your care.  Note: If you are age 65 or older, or if you have a chronic lung disease or condition that affects your immune system, or you smoke, talk to your healthcare provider about having pneumococcal vaccinations and a yearly influenza vaccination (flu shot).  When to seek medical advice  Call your healthcare provider right away if any of these occur:  · Fever of 100.4°F (38°C) or higher  · Coughing up increased amounts of colored sputum  · Weakness, drowsiness, headache, facial pain, ear pain, or a stiff neck  Call 911, or get immediate medical care  Contact emergency services right away if any of these occur.  · Coughing up blood  · Worsening weakness, drowsiness, headache, or stiff neck  · Trouble breathing, wheezing, or pain with breathing  Date Last Reviewed: 9/13/2015  © 7199-4195 [employer]. [address] [place] [address],  [place] [address]. All rights reserved. This information is not intended as a substitute for professional medical care. Always follow your healthcare professional's instructions.

## 2019-04-25 ENCOUNTER — TELEPHONE (OUTPATIENT)
Dept: ENDOSCOPY | Facility: HOSPITAL | Age: 64
End: 2019-04-25

## 2019-04-25 NOTE — TELEPHONE ENCOUNTER
Pt's wife left message on [employer]. Retrieved message and called her back. Advised her that there was a duplicate order for the colonoscopy and that i've deleted it. She verbalized an understanding.

## 2019-04-29 ENCOUNTER — CLINICAL SUPPORT (OUTPATIENT)
Dept: CARDIOLOGY | Facility: CLINIC | Age: 64
End: 2019-04-29
Attending: INTERNAL MEDICINE
Payer: MEDICARE

## 2019-04-29 DIAGNOSIS — I47.29 NSVT (NONSUSTAINED VENTRICULAR TACHYCARDIA): ICD-10-CM

## 2019-04-29 DIAGNOSIS — I50.42 CHRONIC COMBINED SYSTOLIC AND DIASTOLIC CONGESTIVE HEART FAILURE: ICD-10-CM

## 2019-04-29 DIAGNOSIS — I25.5 ISCHEMIC CARDIOMYOPATHY: ICD-10-CM

## 2019-04-29 DIAGNOSIS — Z95.810 ICD (IMPLANTABLE CARDIOVERTER-DEFIBRILLATOR) IN PLACE: ICD-10-CM

## 2019-04-29 PROCEDURE — 93296 REM INTERROG EVL PM/IDS: CPT | Mod: PBBFAC,PN | Performed by: INTERNAL MEDICINE

## 2019-04-29 PROCEDURE — 93295 ICD REMOTE: ICD-10-PCS | Mod: ,,, | Performed by: INTERNAL MEDICINE

## 2019-04-29 PROCEDURE — 93297 REM INTERROG DEV EVAL ICPMS: CPT | Mod: ,,, | Performed by: INTERNAL MEDICINE

## 2019-04-29 PROCEDURE — 93295 DEV INTERROG REMOTE 1/2/MLT: CPT | Mod: ,,, | Performed by: INTERNAL MEDICINE

## 2019-04-29 PROCEDURE — 93297 ICD REMOTE: ICD-10-PCS | Mod: ,,, | Performed by: INTERNAL MEDICINE

## 2019-05-01 DIAGNOSIS — I47.29 NSVT (NONSUSTAINED VENTRICULAR TACHYCARDIA): ICD-10-CM

## 2019-05-01 DIAGNOSIS — Z95.810 ICD (IMPLANTABLE CARDIOVERTER-DEFIBRILLATOR) IN PLACE: Primary | ICD-10-CM

## 2019-05-01 DIAGNOSIS — I50.42 CHRONIC COMBINED SYSTOLIC AND DIASTOLIC CONGESTIVE HEART FAILURE: ICD-10-CM

## 2019-05-01 DIAGNOSIS — I25.5 ISCHEMIC CARDIOMYOPATHY: ICD-10-CM

## 2019-05-03 RX ORDER — METOLAZONE 5 MG/1
5 TABLET ORAL DAILY PRN
Qty: 30 TABLET | Refills: 0 | OUTPATIENT
Start: 2019-05-03 | End: 2020-05-02

## 2019-05-17 ENCOUNTER — OFFICE VISIT (OUTPATIENT)
Dept: OPHTHALMOLOGY | Facility: CLINIC | Age: 64
End: 2019-05-17
Payer: MEDICARE

## 2019-05-17 DIAGNOSIS — H40.1132 PRIMARY OPEN ANGLE GLAUCOMA (POAG) OF BOTH EYES, MODERATE STAGE: Primary | ICD-10-CM

## 2019-05-17 DIAGNOSIS — H40.1122 PRIMARY OPEN ANGLE GLAUCOMA (POAG) OF LEFT EYE, MODERATE STAGE: ICD-10-CM

## 2019-05-17 PROCEDURE — 92133 POSTERIOR SEGMENT OCT OPTIC NERVE(OCULAR COHERENCE TOMOGRAPHY) - OU - BOTH EYES: ICD-10-PCS | Mod: 26,S$PBB,, | Performed by: OPHTHALMOLOGY

## 2019-05-17 PROCEDURE — 92012 INTRM OPH EXAM EST PATIENT: CPT | Mod: S$PBB,,, | Performed by: OPHTHALMOLOGY

## 2019-05-17 PROCEDURE — 99212 OFFICE O/P EST SF 10 MIN: CPT | Mod: PBBFAC,25,PN | Performed by: OPHTHALMOLOGY

## 2019-05-17 PROCEDURE — 92133 CPTRZD OPH DX IMG PST SGM ON: CPT | Mod: PBBFAC | Performed by: OPHTHALMOLOGY

## 2019-05-17 PROCEDURE — 92012 PR EYE EXAM, EST PATIENT,INTERMED: ICD-10-PCS | Mod: S$PBB,,, | Performed by: OPHTHALMOLOGY

## 2019-05-17 PROCEDURE — 99999 PR PBB SHADOW E&M-EST. PATIENT-LVL II: ICD-10-PCS | Mod: PBBFAC,,, | Performed by: OPHTHALMOLOGY

## 2019-05-17 PROCEDURE — 99999 PR PBB SHADOW E&M-EST. PATIENT-LVL II: CPT | Mod: PBBFAC,,, | Performed by: OPHTHALMOLOGY

## 2019-05-17 RX ORDER — BIMATOPROST 0.3 MG/ML
1 SOLUTION/ DROPS OPHTHALMIC NIGHTLY
Qty: 2.5 ML | Refills: 11 | Status: SHIPPED | OUTPATIENT
Start: 2019-05-17 | End: 2019-08-16 | Stop reason: SDUPTHER

## 2019-05-17 RX ORDER — BRIMONIDINE TARTRATE AND TIMOLOL MALEATE 2; 5 MG/ML; MG/ML
1 SOLUTION OPHTHALMIC EVERY 12 HOURS
Qty: 10 ML | Refills: 6 | Status: SHIPPED | OUTPATIENT
Start: 2019-05-17 | End: 2019-08-16 | Stop reason: SDUPTHER

## 2019-05-17 NOTE — PROGRESS NOTES
"    ===============================  05/17/2019   Yan Choudhury,   64 y.o. male   Last visit [facility]: :2/11/2019   Last [facility] dept. 2/11/2019  [facility]:  Corrected distance visual acuity was NLP in the right eye and 20/50 +1 in the left eye.   Not recorded         Not recorded         Not recorded        Chief Complaint   Patient presents with    Glaucoma     REV HVF/ 3 MONTH IOP CHECK     Ophthalmic Medications     Ophthalmic-Intraocular Pressure Reducing Agents, Prostaglandin Analogs Start End     bimatoprost (LUMIGAN) 0.03 % ophthalmic drops    2/11/2019 2/11/2020    Sig: Place 1 drop into the left eye every evening.    Route: Left Eye         HPI     Glaucoma      Additional comments: REV HVF/ 3 MONTH IOP CHECK              Comments     --DM since 2000  --H/O BDR and DME OS  H/o [facility] 2014  --Focal OS June 2011  --VRTX/VMA OS "would not expect improvement with treatment secondary to   VRTX"  --Prosthesis OD secondary to trauma  --COAG   Resumed xalatan ou qhs 12/11/17  Tmax 25  (-1)  Gonio Open  Low RNFL  Last VF Bjerrum OS 12/11/17  HVF 5/17/19    Lumigan QHS OS          Last edited by Estella Forbes on 5/17/2019 11:28 AM. (History)          ________________  5/17/2019  Problem List Items Addressed This Visit     None        Coag    vf today  Worse over 10 years stabel sin ealst   rnfl a little worse  moncular  T 17  On lumigan    Add combigan    rtc 10 weeks        .       ===========================    "

## 2019-05-22 ENCOUNTER — OFFICE VISIT (OUTPATIENT)
Dept: DERMATOLOGY | Facility: CLINIC | Age: 64
End: 2019-05-22
Payer: MEDICARE

## 2019-05-22 DIAGNOSIS — L30.4 INTERTRIGO: ICD-10-CM

## 2019-05-22 DIAGNOSIS — L28.1 PRURIGO NODULARIS: Primary | ICD-10-CM

## 2019-05-22 PROCEDURE — 99212 OFFICE O/P EST SF 10 MIN: CPT | Mod: PBBFAC | Performed by: PHYSICIAN ASSISTANT

## 2019-05-22 PROCEDURE — 99213 PR OFFICE/OUTPT VISIT, EST, LEVL III, 20-29 MIN: ICD-10-PCS | Mod: S$PBB,,, | Performed by: PHYSICIAN ASSISTANT

## 2019-05-22 PROCEDURE — 99999 PR PBB SHADOW E&M-EST. PATIENT-LVL II: CPT | Mod: PBBFAC,,, | Performed by: PHYSICIAN ASSISTANT

## 2019-05-22 PROCEDURE — 99213 OFFICE O/P EST LOW 20 MIN: CPT | Mod: S$PBB,,, | Performed by: PHYSICIAN ASSISTANT

## 2019-05-22 PROCEDURE — 99999 PR PBB SHADOW E&M-EST. PATIENT-LVL II: ICD-10-PCS | Mod: PBBFAC,,, | Performed by: PHYSICIAN ASSISTANT

## 2019-05-22 RX ORDER — TRIAMCINOLONE ACETONIDE 1 MG/G
OINTMENT TOPICAL
Qty: 30 G | Refills: 1 | Status: SHIPPED | OUTPATIENT
Start: 2019-05-22 | End: 2019-06-10 | Stop reason: SDUPTHER

## 2019-05-22 NOTE — PROGRESS NOTES
Subjective:       Patient ID:  Yan Choudhury is a 64 y.o. male who presents for   Chief Complaint   Patient presents with    Rash     c/o rash to bilateral arms x 2 weeks, itchy     Hx of intertrigo, last seen by Dr. Mars 4/10/19, previous tx: ketoconazole 2% cream bid, TAC 0.025% cream bid, zeasorb powder bid, hydroxyzine 10 mg po qhs. + Improvement, did not realize he has refills of meds.    New c/o  History of Present Illness: The patient presents with chief complaint of rash.  Location: bilateral arms  Duration: 2 weeks  Signs/Symptoms: raised bumps, itching, discoloration    Prior treatments: none    PMHX: CKD stage 3, IDDM, CHF        Review of Systems   Constitutional: Negative for fever and chills.   Gastrointestinal: Negative for nausea and vomiting.   Skin: Positive for itching, rash and dry skin. Negative for daily sunscreen use, activity-related sunscreen use and recent sunburn.   Hematologic/Lymphatic: Does not bruise/bleed easily.        Objective:    Physical Exam   Constitutional: He appears well-developed and well-nourished. No distress.   Neurological: He is alert and oriented to person, place, and time. He is not disoriented.   Psychiatric: He has a normal mood and affect.   Skin:   Areas Examined (abnormalities noted in diagram):   Head / Face Inspection Performed  Neck Inspection Performed  Chest / Axilla Inspection Performed  Abdomen Inspection Performed  Genitals / Buttocks / Groin Inspection Performed  Back Inspection Performed  RUE Inspected  LUE Inspection Performed  RLE Inspected  LLE Inspection Performed  Nails and Digits Inspection Performed              Diagram Legend     Erythematous scaling macule/papule c/w actinic keratosis       Vascular papule c/w angioma      Pigmented verrucoid papule/plaque c/w seborrheic keratosis      Yellow umbilicated papule c/w sebaceous hyperplasia      Irregularly shaped tan macule c/w lentigo     1-2 mm smooth white papules consistent with Milia       Movable subcutaneous cyst with punctum c/w epidermal inclusion cyst      Subcutaneous movable cyst c/w pilar cyst      Firm pink to brown papule c/w dermatofibroma      Pedunculated fleshy papule(s) c/w skin tag(s)      Evenly pigmented macule c/w junctional nevus     Mildly variegated pigmented, slightly irregular-bordered macule c/w mildly atypical nevus      Flesh colored to evenly pigmented papule c/w intradermal nevus       Pink pearly papule/plaque c/w basal cell carcinoma      Erythematous hyperkeratotic cursted plaque c/w SCC      Surgical scar with no sign of skin cancer recurrence      Open and closed comedones      Inflammatory papules and pustules      Verrucoid papule consistent consistent with wart     Erythematous eczematous patches and plaques     Dystrophic onycholytic nail with subungual debris c/w onychomycosis     Umbilicated papule    Erythematous-base heme-crusted tan verrucoid plaque consistent with inflamed seborrheic keratosis     Erythematous Silvery Scaling Plaque c/w Psoriasis     See annotation      Assessment / Plan:        Prurigo nodularis  -     triamcinolone acetonide 0.1% (KENALOG) 0.1 % ointment; AAA of arms bid prn itching. Moderate steroid- do not apply to face or groin.  Dispense: 30 g; Refill: 1  Will start above med bid prn itching. Discussed dx and the importance to avoid scratching.      Intertrigo  Improved, provided reassurance. Discussed risk of recurrence. Continue zeasorb bid-tid for maintenance. Reserve ketoconazole and TAC prn flares. Advised patient of available refills for topicals and hydroxyzine. Reserve hydroxyzine 10 mg qhs prn flares. Reminded patient of risk of sedation and falls. Patient verbalized understanding.      Follow up in about 3 months (around 8/22/2019) for intertrigo.

## 2019-06-01 RX ORDER — METOLAZONE 5 MG/1
5 TABLET ORAL DAILY PRN
Qty: 30 TABLET | Refills: 0 | OUTPATIENT
Start: 2019-06-01 | End: 2020-05-31

## 2019-06-03 RX ORDER — ALLOPURINOL 100 MG/1
TABLET ORAL
Qty: 90 TABLET | Refills: 0 | Status: SHIPPED | OUTPATIENT
Start: 2019-06-03 | End: 2019-08-30 | Stop reason: SDUPTHER

## 2019-06-04 ENCOUNTER — HOSPITAL ENCOUNTER (OUTPATIENT)
Dept: RADIOLOGY | Facility: HOSPITAL | Age: 64
Discharge: HOME | End: 2019-06-04
Attending: INTERNAL MEDICINE
Payer: MEDICARE

## 2019-06-04 ENCOUNTER — DOCUMENTATION ONLY (OUTPATIENT)
Dept: CARDIOLOGY | Facility: CLINIC | Age: 64
End: 2019-06-04

## 2019-06-04 ENCOUNTER — CLINICAL SUPPORT (OUTPATIENT)
Dept: CARDIOLOGY | Facility: CLINIC | Age: 64
End: 2019-06-04
Attending: INTERNAL MEDICINE
Payer: MEDICARE

## 2019-06-04 DIAGNOSIS — N18.30 CKD (CHRONIC KIDNEY DISEASE) STAGE 3, GFR 30-59 ML/MIN: ICD-10-CM

## 2019-06-04 DIAGNOSIS — I44.7 LBBB (LEFT BUNDLE BRANCH BLOCK): ICD-10-CM

## 2019-06-04 DIAGNOSIS — I25.10 CAD, MULTIPLE VESSEL: ICD-10-CM

## 2019-06-04 DIAGNOSIS — I25.5 ISCHEMIC CARDIOMYOPATHY: ICD-10-CM

## 2019-06-04 DIAGNOSIS — R94.39 ABNORMAL STRESS TEST: ICD-10-CM

## 2019-06-04 DIAGNOSIS — I15.2 HYPERTENSION ASSOCIATED WITH DIABETES: ICD-10-CM

## 2019-06-04 DIAGNOSIS — E11.22 TYPE 2 DIABETES MELLITUS WITH STAGE 3 CHRONIC KIDNEY DISEASE, WITH LONG-TERM CURRENT USE OF INSULIN: ICD-10-CM

## 2019-06-04 DIAGNOSIS — E11.59 HYPERTENSION ASSOCIATED WITH DIABETES: ICD-10-CM

## 2019-06-04 DIAGNOSIS — I35.1 NONRHEUMATIC AORTIC VALVE INSUFFICIENCY: ICD-10-CM

## 2019-06-04 DIAGNOSIS — Z95.810 BIVENTRICULAR ICD (IMPLANTABLE CARDIOVERTER-DEFIBRILLATOR) IN PLACE: ICD-10-CM

## 2019-06-04 DIAGNOSIS — I50.42 CHRONIC COMBINED SYSTOLIC AND DIASTOLIC CONGESTIVE HEART FAILURE: ICD-10-CM

## 2019-06-04 DIAGNOSIS — R94.31 ABNORMAL ECG: ICD-10-CM

## 2019-06-04 DIAGNOSIS — Z79.4 TYPE 2 DIABETES MELLITUS WITH STAGE 3 CHRONIC KIDNEY DISEASE, WITH LONG-TERM CURRENT USE OF INSULIN: ICD-10-CM

## 2019-06-04 DIAGNOSIS — N18.30 TYPE 2 DIABETES MELLITUS WITH STAGE 3 CHRONIC KIDNEY DISEASE, WITH LONG-TERM CURRENT USE OF INSULIN: ICD-10-CM

## 2019-06-04 DIAGNOSIS — Z95.810 BIVENTRICULAR ICD (IMPLANTABLE CARDIOVERTER-DEFIBRILLATOR) IN PLACE: Chronic | ICD-10-CM

## 2019-06-04 LAB — DIASTOLIC DYSFUNCTION: NO

## 2019-06-04 PROCEDURE — A9502 TC99M TETROFOSMIN: HCPCS

## 2019-06-04 PROCEDURE — 93016 CV STRESS TEST SUPVJ ONLY: CPT | Mod: S$PBB,,, | Performed by: INTERNAL MEDICINE

## 2019-06-04 PROCEDURE — 93016 NM MULTI PHARM STRESS CARDIAC COMPONENT: ICD-10-PCS | Mod: S$PBB,,, | Performed by: INTERNAL MEDICINE

## 2019-06-04 PROCEDURE — 93018 NM MULTI PHARM STRESS CARDIAC COMPONENT: ICD-10-PCS | Mod: S$PBB,,, | Performed by: INTERNAL MEDICINE

## 2019-06-04 PROCEDURE — 78452 HT MUSCLE IMAGE SPECT MULT: CPT | Mod: 26,,, | Performed by: INTERNAL MEDICINE

## 2019-06-04 PROCEDURE — 93018 CV STRESS TEST I&R ONLY: CPT | Mod: S$PBB,,, | Performed by: INTERNAL MEDICINE

## 2019-06-04 PROCEDURE — 78452 NM MULTI PHARM STRESS CARDIAC COMPONENT: ICD-10-PCS | Mod: 26,,, | Performed by: INTERNAL MEDICINE

## 2019-06-04 NOTE — PROGRESS NOTES
Pt presented today for a pharmaceutical nuclear stress. During assessment baseline BP 91/59. Pt typical systolic BP history 100-90's. He also states his BP is low when he check it at home. No current symptoms- no dizziness, no chest pain, no headache, no lightheadedness. Proceeded with testing- activities were implemented (tapping feet and squeezing stress ball). Ending BP 90/52. Pt tolerated stress portion well. Pt did not verbalize any complaints.

## 2019-06-10 ENCOUNTER — OFFICE VISIT (OUTPATIENT)
Dept: DERMATOLOGY | Facility: CLINIC | Age: 64
End: 2019-06-10
Payer: MEDICARE

## 2019-06-10 ENCOUNTER — OFFICE VISIT (OUTPATIENT)
Dept: CARDIOLOGY | Facility: CLINIC | Age: 64
End: 2019-06-10
Payer: MEDICARE

## 2019-06-10 VITALS
BODY MASS INDEX: 26.53 KG/M2 | SYSTOLIC BLOOD PRESSURE: 90 MMHG | HEIGHT: 68 IN | WEIGHT: 175.06 LBS | DIASTOLIC BLOOD PRESSURE: 64 MMHG | HEART RATE: 84 BPM

## 2019-06-10 DIAGNOSIS — I25.10 CAD, MULTIPLE VESSEL: ICD-10-CM

## 2019-06-10 DIAGNOSIS — E11.22 TYPE 2 DIABETES MELLITUS WITH STAGE 3 CHRONIC KIDNEY DISEASE, WITH LONG-TERM CURRENT USE OF INSULIN: ICD-10-CM

## 2019-06-10 DIAGNOSIS — I15.2 HYPERTENSION ASSOCIATED WITH DIABETES: ICD-10-CM

## 2019-06-10 DIAGNOSIS — R94.39 ABNORMAL STRESS TEST: ICD-10-CM

## 2019-06-10 DIAGNOSIS — I27.20 PULMONARY HTN: ICD-10-CM

## 2019-06-10 DIAGNOSIS — I50.42 CHRONIC COMBINED SYSTOLIC AND DIASTOLIC CONGESTIVE HEART FAILURE: Primary | ICD-10-CM

## 2019-06-10 DIAGNOSIS — L28.1 PRURIGO NODULARIS: Primary | ICD-10-CM

## 2019-06-10 DIAGNOSIS — I25.5 ISCHEMIC CARDIOMYOPATHY: ICD-10-CM

## 2019-06-10 DIAGNOSIS — Z79.4 TYPE 2 DIABETES MELLITUS WITH STAGE 3 CHRONIC KIDNEY DISEASE, WITH LONG-TERM CURRENT USE OF INSULIN: ICD-10-CM

## 2019-06-10 DIAGNOSIS — N18.30 TYPE 2 DIABETES MELLITUS WITH STAGE 3 CHRONIC KIDNEY DISEASE, WITH LONG-TERM CURRENT USE OF INSULIN: ICD-10-CM

## 2019-06-10 DIAGNOSIS — E11.69 HYPERLIPIDEMIA ASSOCIATED WITH TYPE 2 DIABETES MELLITUS: ICD-10-CM

## 2019-06-10 DIAGNOSIS — L30.4 INTERTRIGO: ICD-10-CM

## 2019-06-10 DIAGNOSIS — Z95.810 BIVENTRICULAR ICD (IMPLANTABLE CARDIOVERTER-DEFIBRILLATOR) IN PLACE: Chronic | ICD-10-CM

## 2019-06-10 DIAGNOSIS — E78.5 HYPERLIPIDEMIA ASSOCIATED WITH TYPE 2 DIABETES MELLITUS: ICD-10-CM

## 2019-06-10 DIAGNOSIS — I35.1 NONRHEUMATIC AORTIC VALVE INSUFFICIENCY: ICD-10-CM

## 2019-06-10 DIAGNOSIS — I44.7 LBBB (LEFT BUNDLE BRANCH BLOCK): ICD-10-CM

## 2019-06-10 DIAGNOSIS — I47.29 NSVT (NONSUSTAINED VENTRICULAR TACHYCARDIA): ICD-10-CM

## 2019-06-10 DIAGNOSIS — R94.31 ABNORMAL ECG: ICD-10-CM

## 2019-06-10 DIAGNOSIS — N18.30 CKD (CHRONIC KIDNEY DISEASE) STAGE 3, GFR 30-59 ML/MIN: ICD-10-CM

## 2019-06-10 DIAGNOSIS — E11.59 HYPERTENSION ASSOCIATED WITH DIABETES: ICD-10-CM

## 2019-06-10 PROBLEM — R79.89 ELEVATED TROPONIN: Status: RESOLVED | Noted: 2019-04-02 | Resolved: 2019-06-10

## 2019-06-10 PROCEDURE — 99999 PR PBB SHADOW E&M-EST. PATIENT-LVL III: ICD-10-PCS | Mod: PBBFAC,,, | Performed by: PHYSICIAN ASSISTANT

## 2019-06-10 PROCEDURE — 99999 PR PBB SHADOW E&M-EST. PATIENT-LVL III: CPT | Mod: PBBFAC,,, | Performed by: INTERNAL MEDICINE

## 2019-06-10 PROCEDURE — 99213 PR OFFICE/OUTPT VISIT, EST, LEVL III, 20-29 MIN: ICD-10-PCS | Mod: S$PBB,,, | Performed by: PHYSICIAN ASSISTANT

## 2019-06-10 PROCEDURE — 99213 OFFICE O/P EST LOW 20 MIN: CPT | Mod: PBBFAC | Performed by: INTERNAL MEDICINE

## 2019-06-10 PROCEDURE — 99999 PR PBB SHADOW E&M-EST. PATIENT-LVL III: ICD-10-PCS | Mod: PBBFAC,,, | Performed by: INTERNAL MEDICINE

## 2019-06-10 PROCEDURE — 99214 PR OFFICE/OUTPT VISIT, EST, LEVL IV, 30-39 MIN: ICD-10-PCS | Mod: S$PBB,,, | Performed by: INTERNAL MEDICINE

## 2019-06-10 PROCEDURE — 99214 OFFICE O/P EST MOD 30 MIN: CPT | Mod: S$PBB,,, | Performed by: INTERNAL MEDICINE

## 2019-06-10 PROCEDURE — 99213 OFFICE O/P EST LOW 20 MIN: CPT | Mod: S$PBB,,, | Performed by: PHYSICIAN ASSISTANT

## 2019-06-10 PROCEDURE — 99213 OFFICE O/P EST LOW 20 MIN: CPT | Mod: PBBFAC,27 | Performed by: PHYSICIAN ASSISTANT

## 2019-06-10 PROCEDURE — 99999 PR PBB SHADOW E&M-EST. PATIENT-LVL III: CPT | Mod: PBBFAC,,, | Performed by: PHYSICIAN ASSISTANT

## 2019-06-10 RX ORDER — HYDROXYZINE HYDROCHLORIDE 10 MG/1
10 TABLET, FILM COATED ORAL
Refills: 2 | COMMUNITY
Start: 2019-06-03 | End: 2019-06-28 | Stop reason: SDUPTHER

## 2019-06-10 RX ORDER — TRIAMCINOLONE ACETONIDE 1 MG/G
OINTMENT TOPICAL
Qty: 30 G | Refills: 1 | Status: SHIPPED | OUTPATIENT
Start: 2019-06-10 | End: 2019-10-08

## 2019-06-10 RX ORDER — TRIAMCINOLONE ACETONIDE 0.25 MG/G
CREAM TOPICAL 2 TIMES DAILY PRN
Qty: 80 G | Refills: 1 | Status: SHIPPED | OUTPATIENT
Start: 2019-06-10 | End: 2019-10-08

## 2019-06-10 RX ORDER — KETOCONAZOLE 20 MG/G
CREAM TOPICAL
Qty: 60 G | Refills: 3 | Status: SHIPPED | OUTPATIENT
Start: 2019-06-10

## 2019-06-10 NOTE — PATIENT INSTRUCTIONS
Use a mild gentle soap such as Dove for Sensitive Skin or Cetaphil Gentle Cleanser.  Recommend fragrance free and hypoallergenic skin care products.  Shower or bathe once daily. Limit duration to 5 minutes with lukewarm water.  Apply moisturizer immediately after showering.  Some good moisturizers to try are Cetaphil, CeraVe, or Aveeno.   \

## 2019-06-10 NOTE — PROGRESS NOTES
Subjective:    Patient ID:  Yan Choudhury is a 64 y.o. male who presents for evaluation of Coronary Artery Disease; Congestive Heart Failure; Hypertension; Hyperlipidemia; and Risk Factor Management For Atherosclerosis      HPI Pt presents for fu.  His current medical conditions include CHF, CAD/ICM, DCM, LBBB, HTN, CRI, DM, CRT-ICD, PTHN, dyslipidemia, obesity. Nonsmoker.   Past hx pertinent for following:  LHC was done 2009 showed diffuse CAD, and severe distal CAD involving apical LAD, distal RCA/distal LCX.   Echo Oct 2010 showed LVEF 25%.   PET stress 2016 showed inferior scar, no significant ischemia noted and LVEF < 35%.   s/p BIV ICD 3/16 for CHF/LBBB.  S/p admission 4/19 for acute on chronic CHF exacerbation.  Echo April 19 EF 15 - 20%, LVE, mod AI, mild-mod TR, mild MR, severe PHTN 77 mmHg.  BNP 4/19 markedly more elevated.  Troponin + 4/19, higher than normal.  ecg 4/2/19 V pacing.  ICD interrogation 3/19 normal device function, NSVT.  Now here.  BNP dramatically improved, down to 377.  Was > 2000 4/19.  Now on Entresto.  Weight stable.  CRI creatinine 2.7  DM well controlled.  Denies chest pain sxs.  Stress MPI 6/19 multivessel scar, trivial ischemia, EF 28%.  CHF seems stable. Stable LIM, no pnd/orthopnea.  No leg edema.  Lipids controlled.  ICD has not fired.        Current Outpatient Medications:     albuterol (VENTOLIN HFA) 90 mcg/actuation inhaler, Inhale 2 puffs into the lungs every 6 (six) hours as needed for Wheezing. Rescue, Disp: 1 Inhaler, Rfl: 6    allopurinol (ZYLOPRIM) 100 MG tablet, TAKE 1 TABLET (100 MG TOTAL) BY MOUTH ONCE DAILY., Disp: 90 tablet, Rfl: 0    aspirin (ECOTRIN) 81 MG EC tablet, Take 81 mg by mouth every morning. , Disp: , Rfl:     BD INSULIN SYRINGE ULTRA-FINE 0.5 mL 31 gauge x 5/16 Syrg, USE AS DIRECTED TO INJECT INSULIN TWO TIMES DAILY, Disp: 60 each, Rfl: 11    benzonatate (TESSALON) 200 MG capsule, Take 1 capsule (200 mg total) by mouth 3 (three) times daily as  "needed for Cough., Disp: 30 capsule, Rfl: 0    bimatoprost (LUMIGAN) 0.03 % ophthalmic drops, Place 1 drop into the left eye every evening., Disp: 2.5 mL, Rfl: 11    blood sugar diagnostic Strp, 1 strip by Misc.(Non-Drug; Combo Route) route 4 (four) times daily. Telcare, Disp: 120 strip, Rfl: 11    brimonidine-timolol (COMBIGAN) 0.2-0.5 % Drop, Place 1 drop into both eyes every 12 (twelve) hours., Disp: 10 mL, Rfl: 6    bumetanide (BUMEX) 2 MG tablet, MWF take 1 whole tab and on Tues, Thurs, Sat, Sun  Take 1/2 tab twice daily, Disp: 75 tablet, Rfl: 11    cholecalciferol, vitamin D3, (VITAMIN D3) 1,000 unit capsule, Take 1,000 Units by mouth once daily., Disp: , Rfl:     hydrOXYzine HCl (ATARAX) 10 MG Tab, Take 10 mg by mouth as needed., Disp: , Rfl: 2    insulin glargine (LANTUS) 100 unit/mL injection, 50 units bid prn when BS< 150, Disp: 30 mL, Rfl: 11    IRON/VITAMIN B COMPLEX (GERITOL ORAL), Take by mouth., Disp: , Rfl:     ketoconazole (NIZORAL) 2 % cream, AAA bid, Disp: 60 g, Rfl: 3    lancets [facility], For tel care, Disp: 120 each, Rfl: 11    metOLazone (ZAROXOLYN) 5 MG tablet, Take 1 tablet (5 mg total) by mouth daily as needed., Disp: 30 tablet, Rfl: 0    miconazole NITRATE 2 % (ZEASORB AF) 2 % top powder, Use two to three times daily to prevent rash, Disp: 85 g, Rfl: 11    nystatin (MYCOSTATIN) cream, Apply topically 2 (two) times daily. Apply to bilateral groins, Disp: 30 g, Rfl: 1    pantoprazole (PROTONIX) 40 MG tablet, TAKE 1 TABLET BY MOUTH EVERY DAY FOR ACID REFLUX, Disp: 90 tablet, Rfl: 1    pen needle, diabetic (BD ULTRA-FINE JOSLYN PEN NEEDLE) 32 gauge x 5/32" Ndle, 1 each by Misc.(Non-Drug; Combo Route) route 4 (four) times daily., Disp: 100 each, Rfl: 11    sacubitril-valsartan (ENTRESTO) 24-26 mg per tablet, Take 1 tablet by mouth 2 (two) times daily., Disp: 60 tablet, Rfl: 12    simvastatin (ZOCOR) 10 MG tablet, TAKE 1 TABLET (10 MG TOTAL) BY MOUTH EVERY EVENING., Disp: 30 tablet, " "Rfl: 7    triamcinolone acetonide 0.025% (KENALOG) 0.025 % cream, Apply topically 2 (two) times daily as needed. Mild steroid, Disp: 80 g, Rfl: 1    triamcinolone acetonide 0.1% (KENALOG) 0.1 % ointment, AAA of arms bid prn itching. Moderate steroid- do not apply to face or groin., Disp: 30 g, Rfl: 1      Review of Systems   Constitution: Negative.   HENT: Negative.    Eyes: Negative.    Cardiovascular: Positive for dyspnea on exertion.   Respiratory: Positive for shortness of breath.    Endocrine: Negative.    Hematologic/Lymphatic: Negative.    Skin: Negative.    Musculoskeletal: Negative.    Gastrointestinal: Negative.    Genitourinary: Negative.    Neurological: Negative.    Psychiatric/Behavioral: Negative.    Allergic/Immunologic: Negative.        BP 90/64 (BP Location: Left arm, Patient Position: Sitting, BP Method: Medium (Manual))   Pulse 84   Ht 5' 8" (1.727 m)   Wt 79.4 kg (175 lb 0.7 oz)   BMI 26.62 kg/m²     Wt Readings from Last 3 Encounters:   06/10/19 79.4 kg (175 lb 0.7 oz)   04/24/19 78 kg (171 lb 15.3 oz)   04/23/19 78.9 kg (173 lb 15.1 oz)     Temp Readings from Last 3 Encounters:   04/24/19 100 °F (37.8 °C) (Tympanic)   04/08/19 97.7 °F (36.5 °C) (Tympanic)   04/04/19 97.1 °F (36.2 °C) (Oral)     BP Readings from Last 3 Encounters:   06/10/19 90/64   04/24/19 93/62   04/23/19 (!) 100/58     Pulse Readings from Last 3 Encounters:   06/10/19 84   04/24/19 105   04/23/19 64          Objective:    Physical Exam   Constitutional: He is oriented to person, place, and time. He appears well-developed and well-nourished.   HENT:   Head: Normocephalic.   Neck: Normal range of motion. Neck supple. Normal carotid pulses, no hepatojugular reflux and no JVD present. Carotid bruit is not present. No thyromegaly present.   Cardiovascular: Normal rate, regular rhythm, S1 normal and S2 normal. PMI is not displaced. Exam reveals no S3, no S4, no distant heart sounds, no friction rub, no midsystolic click and " no opening snap.   No murmur heard.  Pulses:       Radial pulses are 2+ on the right side, and 2+ on the left side.   Pulmonary/Chest: Effort normal and breath sounds normal. He has no wheezes. He has no rales.   Abdominal: Soft. Bowel sounds are normal. He exhibits no distension, no abdominal bruit, no ascites and no mass. There is no tenderness.   Musculoskeletal: He exhibits no edema.   Neurological: He is alert and oriented to person, place, and time.   Skin: Skin is warm.   Psychiatric: He has a normal mood and affect. His behavior is normal.   Nursing note and vitals reviewed.      I have reviewed all pertinent labs and cardiac studies.    Component      Latest Ref Rng & Units 6/4/2019             BNP      0 - 99 pg/mL 377 (H)           Chemistry        Component Value Date/Time     (L) 06/04/2019 1223    K 4.2 06/04/2019 1223     06/04/2019 1223    CO2 25 06/04/2019 1223    BUN 41 (H) 06/04/2019 1223    CREATININE 2.7 (H) 06/04/2019 1223     (H) 06/04/2019 1223        Component Value Date/Time    CALCIUM 9.5 06/04/2019 1223    ALKPHOS 74 06/04/2019 1223    AST 25 06/04/2019 1223    ALT 13 06/04/2019 1223    BILITOT 0.9 06/04/2019 1223    ESTGFRAFRICA 27.2 (A) 06/04/2019 1223    EGFRNONAA 23.5 (A) 06/04/2019 1223        Lab Results   Component Value Date    WBC 5.57 04/04/2019    HGB 11.6 (L) 04/04/2019    HCT 35.4 (L) 04/04/2019    MCV 86 04/04/2019     04/04/2019     Lab Results   Component Value Date    HGBA1C 5.2 04/02/2019     Lab Results   Component Value Date    CHOL 91 (L) 11/16/2018    CHOL 105 (L) 05/02/2018    CHOL 105 (L) 05/02/2018     Lab Results   Component Value Date    HDL 21 (L) 11/16/2018    HDL 28 (L) 05/02/2018    HDL 28 (L) 05/02/2018     Lab Results   Component Value Date    LDLCALC 46.2 (L) 11/16/2018    LDLCALC 52.8 (L) 05/02/2018    LDLCALC 52.8 (L) 05/02/2018     Lab Results   Component Value Date    TRIG 119 11/16/2018    TRIG 121 05/02/2018    TRIG 121  05/02/2018     Lab Results   Component Value Date    CHOLHDL 23.1 11/16/2018    CHOLHDL 26.7 05/02/2018    CHOLHDL 26.7 05/02/2018     Narrative     Date of Procedure: 06/04/2019    PRE-TEST DATA   EKG: Resting EKG reveals ventricular pacing with capture at a rate of 77 bpm.     TEST DESCRIPTION   The patient received 0.4 mg of Regadenoson as an IV bolus. Peak heart rate was 86 bpm, which is 56% of the age predicted maximum heart rate. The patient also received a total of 75 mg of Aminophylline.     EKG Conclusions:    1. The EKG portion of this study is negative for ischemia at a peak heart rate of 86 bpm (56% of predicted).   2. Blood pressure remained stable throughout the protocol  (Presenting BP: 91/59 Peak BP: 92/57).   3. No significant arrhythmias were present.   4. There were no symptoms of chest discomfort or significant dyspnea throughout the protocol.     Nuclear Procedure:  Following a single isotope protocol, 9.9 mCi of Tc99 labeled Tetrofosmin was given at rest and tomographic imaging was performed. Regadenoson pharmacologic stress testing was performed as described above. Immediately following the IV bolus of   regadenoson, 28.8 mCi of Tc99 labeled Tetrofosmin was given and tomographic imaging was performed. The site of the IV injection was the right AC.     Comments:  This is a technically adequate study. Inspection of the transaxial images demonstrated no significant cranial, caudal, or lateral patient motion in the camera between rest and stress acquisitions. On stress images, there is mild to moderate decreased   uptake of radiotracer in the anteroapical wall of the left ventricle, moderate decreased uptake of radiotracer in the apical and anterolateral walls of the left ventricle, and severe decreased uptake of radiotracer that extends from the base to the   apical inferolateral wall of the left ventricle which does not significantly reverse suggestive of injury. There is a trivial amount of  reversibility in the distal aspect of this defect. The remainder of the myocardium demonstrates normal radiotracer   uptake. The extracardiac distribution of radioactivity is normal. The left ventricular cavity is normal in size and does not increase with stress. The left ventricular wall motion is abnormal at rest showing akinesis of the inferolateral wall of the left   ventricle.     Nuclear Quantitative Functional Analysis:   LVEF: 28 %    Impression: ABNORMAL MYOCARDIAL PERFUSION  1. There is mild to moderate intensity fixed defect in the anteroapical wall of the left ventricle, consistent with myocardial injury, moderate intensity fixed defects in the apical and anterolateral walls of the left ventricle, consistent with   myocardial injury, and a moderate size fixed defect of severe intensity that extends from the base to the apical inferolateral wall of the left ventricle, consistent with myocardial injury. There is trivial-mild sweta-injury ischemia.   2. There is abnormal wall motion at rest showing akinesis of the inferolateral wall of the left ventricle.   3. There is resting LV dysfunction with a reduced ejection fraction of 28 %.   4. The ventricular volumes are normal at rest and stress.   5. The extracardiac distribution of radioactivity is normal.           This document has been electronically    SIGNED BY: Luther Calvillo MD On: 06/04/2019 16:38          Narrative     Date of Procedure: 04/29/2019    PRE-TEST DATA   DEVICES:  LEAD  [employer] 1458Q/86 LEAD QUARTET CS 86CM S/N:[id number]  Daniel Graham  3/24/2016 - current     LEAD  [employer] 2088TC/52 LEAD TENDRIL 2088TC/52CM S/N:[id number]  Daniel Graham  3/24/2016 - current     CRT-D  [employer] NH3605-41O ICD QUADRA ASSURA CRT-D DF4 S/N:[id number]  Daniel Graham  3/24/2016 - current     LEAD  7122Q/65 LEAD DURATA 7122Q 65 CM S/N:[id number]  Daniel Graham  3/24/2016 - current       The  following physician is ANTHONY Irizarry    LV:  M3 --> P4        TEST DESCRIPTION   Follow-Up Analysis:  Chamber type: Multi  Mode: DDD    Lower limit rate is 60 bpm  Upper tracking rate is 120 bpm    Paced AV delay/Sensed AV delay: 160/110 ms  VV delay: LV > RV for 15 ms    TACHY ZONES:  VT1     Rate: > 171 bpm       Monitor therapy  .  VF     Rate: 200 bpm       Available therapies: ATP during charging and Shock.    Estimated longevity: 4-5 years  Cap reform date: 3/21/2019  Charge time: 10.1 sec  HV impedance: 61 Ohm    Mode switch: Yes     x 5  Nonsustained VT: Yes     x 1, no therapy    LEADS:  RA Lead       P-wave: 4.8 mV       Impedance: 390 Ohms       Paced: 1%    RV Lead       R-wave: > 12 mV       Impedance: 430 Ohms       Paced: 98%    LV Lead       R-wave:        Impedance: 840 Ohms       Paced: 98%    THRESHOLDS:  RA Lead     0.875 V at 0.5 ms      Auto threshold, Bipolar  RV Lead     0.5 V at 0.5 ms      Auto threshold, Bipolar  LV Lead     0.75 V at 0.5 ms      Auto threshold, Bipolar    The patient had 0 treated episodes.    Wound Comments:  Deferred (home transmission only)      Reprogramming Comments:  None - remote transmission only.      General Comments:  Alert transmission rec'd for HVR - pt had 33 beat run NSVT 4/26/19, -340 ms.    Current EGM: AsBp @ 87 bpm.  Battery voltage 60%.  CorVue currently WNL.  Pt has CHF NYHA class II and ischemic cardiomyopathy.    Interrogation performed by Alee Murphy RN.           This document has been electronically    SIGNED BY: Anthony Graham MD On: 05/02/2019 10:26               Assessment:       1. Chronic combined systolic and diastolic congestive heart failure    2. Abnormal stress test    3. Abnormal ECG    4. Biventricular ICD (implantable cardioverter-defibrillator) in place    5. CAD, multiple vessel    6. Type 2 diabetes mellitus with stage 3 chronic kidney disease, with long-term current use of insulin    7. Pulmonary HTN    8.  NSVT (nonsustained ventricular tachycardia)    9. Nonrheumatic aortic valve insufficiency    10. LBBB (left bundle branch block)    11. Ischemic cardiomyopathy    12. Hypertension associated with diabetes    13. Hyperlipidemia associated with type 2 diabetes mellitus    14. CKD (chronic kidney disease) stage 3, GFR 30-59 ml/min         Plan:             Stable chronic extensive CV conditions on current medical tx.  Reviewed all tests and above medical conditions with pt in detail.  Compensated CHF on current tx.  No changes needed in medical tx today.  Continue OMT for CAD/CHF.  F/u with Nephrology at appt next month.  Cardiac low salt diet.  Continue f/u in ICD clinic.  Keep DM well controlled.  F/u 3 months with labs.

## 2019-06-10 NOTE — PROGRESS NOTES
Subjective:       Patient ID:  Yan Choudhury is a 64 y.o. male who presents for   Chief Complaint   Patient presents with    Rash      f/u rash to Biltateral arms + improvement RX meds     Hx of intertrigo, last seen by me 5/22/19, last seen by Dr. Mars 4/10/19, previous tx: ketoconazole 2% cream bid, TAC 0.025% cream bid, zeasorb powder bid, hydroxyzine 10 mg po qhs. + Improvement.  Using zeasorb daily and reserving TAC and Ketocream bid prn flares. Requesting refills today.    Hx of prurigo nodularis, previous tx: TAC 0.1% cream with improvement.      PMHX: CKD stage 3, IDDM, CHF        Review of Systems   Constitutional: Negative for fever and chills.   Gastrointestinal: Negative for nausea and vomiting.   Skin: Positive for itching and activity-related sunscreen use. Negative for rash, dry skin, daily sunscreen use and recent sunburn.   Hematologic/Lymphatic: Does not bruise/bleed easily.        Objective:    Physical Exam   Constitutional: He appears well-developed and well-nourished. No distress.   Neurological: He is alert and oriented to person, place, and time. He is not disoriented.   Psychiatric: He has a normal mood and affect.   Skin:   Areas Examined (abnormalities noted in diagram):   Head / Face Inspection Performed  Neck Inspection Performed  Chest / Axilla Inspection Performed  Back Inspection Performed  RUE Inspected  LUE Inspection Performed  Nails and Digits Inspection Performed              Diagram Legend     Erythematous scaling macule/papule c/w actinic keratosis       Vascular papule c/w angioma      Pigmented verrucoid papule/plaque c/w seborrheic keratosis      Yellow umbilicated papule c/w sebaceous hyperplasia      Irregularly shaped tan macule c/w lentigo     1-2 mm smooth white papules consistent with Milia      Movable subcutaneous cyst with punctum c/w epidermal inclusion cyst      Subcutaneous movable cyst c/w pilar cyst      Firm pink to brown papule c/w dermatofibroma       Pedunculated fleshy papule(s) c/w skin tag(s)      Evenly pigmented macule c/w junctional nevus     Mildly variegated pigmented, slightly irregular-bordered macule c/w mildly atypical nevus      Flesh colored to evenly pigmented papule c/w intradermal nevus       Pink pearly papule/plaque c/w basal cell carcinoma      Erythematous hyperkeratotic cursted plaque c/w SCC      Surgical scar with no sign of skin cancer recurrence      Open and closed comedones      Inflammatory papules and pustules      Verrucoid papule consistent consistent with wart     Erythematous eczematous patches and plaques     Dystrophic onycholytic nail with subungual debris c/w onychomycosis     Umbilicated papule    Erythematous-base heme-crusted tan verrucoid plaque consistent with inflamed seborrheic keratosis     Erythematous Silvery Scaling Plaque c/w Psoriasis     See annotation      Assessment / Plan:         Prurigo nodularis  -     triamcinolone acetonide 0.1% (KENALOG) 0.1 % ointment; AAA of arms bid prn itching. Moderate steroid- do not apply to face or groin.  Dispense: 30 g; Refill: 1    Improving, will continue TAC 0.1% bid prn itching. Recommend Cetaphil or Cera Ve moisturizer and gentle skin care.     Intertrigo  -     ketoconazole (NIZORAL) 2 % cream; AAA bid prn rash of groin. For Flares.  Dispense: 60 g; Refill: 3  -     triamcinolone acetonide 0.025% (KENALOG) 0.025 % cream; Apply topically 2 (two) times daily as needed. Mild steroid- for flares of groin.  Dispense: 80 g; Refill: 1  Stable, will provide refills for flares. Continue Zeasorb tid for maintenance.         Follow up in about 3 months (around 9/10/2019) for prurigo nodularis.

## 2019-06-28 NOTE — TELEPHONE ENCOUNTER
----- Message from Bob Hagen sent at 6/28/2019  1:41 PM CDT -----  Contact: Wife-Elsie ChoudhuryVwqh-[phone]  Pt would like to have refill of tablets that were prescribed for itching sent to pharmacy. Please call back at  [phone]        Pt uses.  [facility]/pharmacy #[id number] - [place] - [address] AT [facility]  [address] [place] [address]  Phone: [phone] Fax: [phone]

## 2019-06-30 DIAGNOSIS — L30.4 INTERTRIGO: ICD-10-CM

## 2019-06-30 RX ORDER — PANTOPRAZOLE SODIUM 40 MG/1
TABLET, DELAYED RELEASE ORAL
Qty: 90 TABLET | Refills: 1 | Status: SHIPPED | OUTPATIENT
Start: 2019-06-30 | End: 2019-12-12 | Stop reason: SDUPTHER

## 2019-07-01 ENCOUNTER — CLINICAL SUPPORT (OUTPATIENT)
Dept: CARDIOLOGY | Facility: CLINIC | Age: 64
End: 2019-07-01
Attending: INTERNAL MEDICINE
Payer: MEDICARE

## 2019-07-01 DIAGNOSIS — I50.42 CHRONIC COMBINED SYSTOLIC AND DIASTOLIC CONGESTIVE HEART FAILURE: ICD-10-CM

## 2019-07-01 DIAGNOSIS — I47.29 NSVT (NONSUSTAINED VENTRICULAR TACHYCARDIA): ICD-10-CM

## 2019-07-01 DIAGNOSIS — I25.5 ISCHEMIC CARDIOMYOPATHY: ICD-10-CM

## 2019-07-01 DIAGNOSIS — Z95.810 ICD (IMPLANTABLE CARDIOVERTER-DEFIBRILLATOR) IN PLACE: ICD-10-CM

## 2019-07-01 PROCEDURE — 93297 ICD REMOTE: ICD-10-PCS | Mod: ,,, | Performed by: INTERNAL MEDICINE

## 2019-07-01 PROCEDURE — 93296 REM INTERROG EVL PM/IDS: CPT | Mod: PBBFAC | Performed by: INTERNAL MEDICINE

## 2019-07-01 PROCEDURE — 93295 ICD REMOTE: ICD-10-PCS | Mod: ,,, | Performed by: INTERNAL MEDICINE

## 2019-07-01 PROCEDURE — 93295 DEV INTERROG REMOTE 1/2/MLT: CPT | Mod: ,,, | Performed by: INTERNAL MEDICINE

## 2019-07-01 PROCEDURE — 93297 REM INTERROG DEV EVAL ICPMS: CPT | Mod: ,,, | Performed by: INTERNAL MEDICINE

## 2019-07-01 RX ORDER — HYDROXYZINE HYDROCHLORIDE 10 MG/1
10 TABLET, FILM COATED ORAL NIGHTLY PRN
Qty: 30 TABLET | Refills: 2 | Status: SHIPPED | OUTPATIENT
Start: 2019-07-01 | End: 2019-07-10

## 2019-07-10 RX ORDER — HYDROXYZINE HYDROCHLORIDE 10 MG/1
TABLET, FILM COATED ORAL
Qty: 30 TABLET | Refills: 2 | Status: SHIPPED | OUTPATIENT
Start: 2019-07-10 | End: 2019-10-08

## 2019-07-15 ENCOUNTER — LAB VISIT (OUTPATIENT)
Dept: LAB | Facility: HOSPITAL | Age: 64
End: 2019-07-15
Attending: INTERNAL MEDICINE
Payer: MEDICARE

## 2019-07-15 DIAGNOSIS — N25.81 HYPERPARATHYROIDISM, SECONDARY RENAL: ICD-10-CM

## 2019-07-15 DIAGNOSIS — N18.30 CKD (CHRONIC KIDNEY DISEASE) STAGE 3, GFR 30-59 ML/MIN: ICD-10-CM

## 2019-07-15 LAB
ALBUMIN SERPL BCP-MCNC: 3.4 G/DL (ref 3.5–5.2)
ANION GAP SERPL CALC-SCNC: 9 MMOL/L (ref 8–16)
BUN SERPL-MCNC: 51 MG/DL (ref 8–23)
CALCIUM SERPL-MCNC: 9.5 MG/DL (ref 8.7–10.5)
CHLORIDE SERPL-SCNC: 104 MMOL/L (ref 95–110)
CO2 SERPL-SCNC: 25 MMOL/L (ref 23–29)
CREAT SERPL-MCNC: 2.7 MG/DL (ref 0.5–1.4)
EST. GFR  (AFRICAN AMERICAN): 27.2 ML/MIN/1.73 M^2
EST. GFR  (NON AFRICAN AMERICAN): 23.5 ML/MIN/1.73 M^2
GLUCOSE SERPL-MCNC: 132 MG/DL (ref 70–110)
PHOSPHATE SERPL-MCNC: 3.3 MG/DL (ref 2.7–4.5)
POTASSIUM SERPL-SCNC: 4 MMOL/L (ref 3.5–5.1)
PTH-INTACT SERPL-MCNC: 72 PG/ML (ref 9–77)
SODIUM SERPL-SCNC: 138 MMOL/L (ref 136–145)

## 2019-07-15 PROCEDURE — 80069 RENAL FUNCTION PANEL: CPT

## 2019-07-15 PROCEDURE — 36415 COLL VENOUS BLD VENIPUNCTURE: CPT

## 2019-07-15 PROCEDURE — 83970 ASSAY OF PARATHORMONE: CPT

## 2019-07-22 ENCOUNTER — OFFICE VISIT (OUTPATIENT)
Dept: NEPHROLOGY | Facility: CLINIC | Age: 64
End: 2019-07-22
Payer: MEDICARE

## 2019-07-22 VITALS
BODY MASS INDEX: 27.9 KG/M2 | RESPIRATION RATE: 20 BRPM | DIASTOLIC BLOOD PRESSURE: 64 MMHG | SYSTOLIC BLOOD PRESSURE: 110 MMHG | HEIGHT: 68 IN | WEIGHT: 184.06 LBS | HEART RATE: 80 BPM

## 2019-07-22 DIAGNOSIS — R80.9 PROTEINURIA, UNSPECIFIED TYPE: ICD-10-CM

## 2019-07-22 DIAGNOSIS — N18.4 CKD (CHRONIC KIDNEY DISEASE) STAGE 4, GFR 15-29 ML/MIN: Primary | ICD-10-CM

## 2019-07-22 PROCEDURE — 99213 OFFICE O/P EST LOW 20 MIN: CPT | Mod: PBBFAC | Performed by: INTERNAL MEDICINE

## 2019-07-22 PROCEDURE — 99999 PR PBB SHADOW E&M-EST. PATIENT-LVL III: CPT | Mod: PBBFAC,,, | Performed by: INTERNAL MEDICINE

## 2019-07-22 PROCEDURE — 99999 PR PBB SHADOW E&M-EST. PATIENT-LVL III: ICD-10-PCS | Mod: PBBFAC,,, | Performed by: INTERNAL MEDICINE

## 2019-07-22 PROCEDURE — 99214 PR OFFICE/OUTPT VISIT, EST, LEVL IV, 30-39 MIN: ICD-10-PCS | Mod: S$PBB,,, | Performed by: INTERNAL MEDICINE

## 2019-07-22 PROCEDURE — 99214 OFFICE O/P EST MOD 30 MIN: CPT | Mod: S$PBB,,, | Performed by: INTERNAL MEDICINE

## 2019-07-22 NOTE — PROGRESS NOTES
PROGRESS NOTE FOR ESTABLISHED PATIENT    PHYSICIAN REQUESTING THE CONSULT: Dr. Sandra Estevez    REASON FOR VISIT: Renal insufficiency    64 y.o. male with history of CKD 3/4, HTN, CHF, anemia, DM2, CAD, pulmonary hypertension presents to the renal clinic for evaluation of renal insufficiency.       Patient had recent hospital stay at [facility] in 11/2018 when he presented with leak from colostomy reversal and BROOKE. Peak creatinine was 8.2 on 11/15/18. Renal function has recovered with IV fluids and creatinine has now declined to 2.7 (7/15/19).    Patient today reports mild LE edema today.  No other issues.          Past Medical History:   Diagnosis Date    Arthritis     CAD (coronary artery disease)     Cataract     Chronic combined systolic and diastolic congestive heart failure 3/24/2015    CKD (chronic kidney disease), stage III     Diabetes mellitus type II      AM    Diabetic retinopathy     anti Veg F injections for macular edema    Diverticulitis of colon     ED (erectile dysfunction)     Encounter for blood transfusion     Glaucoma     HTN (hypertension)     Hyperlipidemia     Ischemic cardiomyopathy     Obesity     JAHAIRA on CPAP     Pacemaker     Pulmonary HTN        Past Surgical History:   Procedure Laterality Date    CARDIAC CATHETERIZATION  2009    CHOLECYSTECTOMY      CLOSURE, ILEOSTOMY N/A 1/22/2019    [facility] at [facility] OR    COLECTOMY-SIGMOID N/A 4/22/2016    [facility] OR    COLON SURGERY      Sigmoid resection    COLONOSCOPY N/A 10/11/2018    Performed by Quinn Aragon [facility] at [facility] ENDO    COLOSTOMY N/A 4/22/2016    [facility] OR    CREATION, ILEOSTOMY N/A 10/30/2018    [facility] OR    EYE SURGERY      HEMICOLECTOMY, RIGHT Right 10/30/2018    [facility] OR    Ileostomy reversal  01/2019    KNEE SURGERY      MOBILIZATION,  SPLENIC FLEXURE N/A 10/30/2018    [facility]    REVISION OR CLOSURE, COLOSTOMY N/A 10/30/2018    [facility] OR    SIGMOIDOSCOPY, FLEXIBLE N/A 1/18/2019    [facility] by Quinn [facility] ENDO       Review of patient's allergies indicates:  No Known Allergies    Current Outpatient Medications   Medication Sig Dispense Refill    albuterol (VENTOLIN HFA) 90 mcg/actuation inhaler Inhale 2 puffs into the lungs every 6 (six) hours as needed for Wheezing. Rescue 1 Inhaler 6    allopurinol (ZYLOPRIM) 100 MG tablet TAKE 1 TABLET (100 MG TOTAL) BY MOUTH ONCE DAILY. 90 tablet 0    aspirin (ECOTRIN) 81 MG EC tablet Take 81 mg by mouth every morning.       BD INSULIN SYRINGE ULTRA-FINE 0.5 mL 31 gauge x 5/16 Syrg USE AS DIRECTED TO INJECT INSULIN TWO TIMES DAILY 60 each 11    bimatoprost (LUMIGAN) 0.03 % ophthalmic drops Place 1 drop into the left eye every evening. 2.5 mL 11    blood sugar diagnostic Strp 1 strip by Misc.(Non-Drug; Combo Route) route 4 (four) times daily. Telcare 120 strip 11    brimonidine-timolol (COMBIGAN) 0.2-0.5 % Drop Place 1 drop into both eyes every 12 (twelve) hours. 10 mL 6    bumetanide (BUMEX) 2 MG tablet MWF take 1 whole tab and on Tues, Thurs, Sat, Sun  Take 1/2 tab twice daily 75 tablet 11    cholecalciferol, vitamin D3, (VITAMIN D3) 1,000 unit capsule Take 1,000 Units by mouth once daily.      hydrOXYzine HCl (ATARAX) 10 MG Tab PLEASE SEE ATTACHED FOR DETAILED DIRECTIONS 30 tablet 2    insulin glargine (LANTUS) 100 unit/mL injection 50 units bid prn when BS< 150 30 mL 11    IRON/VITAMIN B COMPLEX (GERITOL ORAL) Take by mouth.      ketoconazole (NIZORAL) 2 % cream AAA bid prn rash of groin. For Flares. 60 g 3    lancets [facility] For tel care 120 each 11    metOLazone (ZAROXOLYN) 5 MG tablet Take 1 tablet (5 mg total) by mouth daily as needed. 30 tablet 0    miconazole NITRATE 2 % (ZEASORB AF) 2 % top powder  "Use two to three times daily to prevent rash 85 g 11    nystatin (MYCOSTATIN) cream Apply topically 2 (two) times daily. Apply to bilateral groins 30 g 1    pantoprazole (PROTONIX) 40 MG tablet TAKE 1 TABLET BY MOUTH EVERY DAY FOR ACID REFLUX 90 tablet 1    pen needle, diabetic (BD ULTRA-FINE JOSLYN PEN NEEDLE) 32 gauge x 5/32" Ndle 1 each by Misc.(Non-Drug; Combo Route) route 4 (four) times daily. 100 each 11    sacubitril-valsartan (ENTRESTO) 24-26 mg per tablet Take 1 tablet by mouth 2 (two) times daily. 60 tablet 12    simvastatin (ZOCOR) 10 MG tablet TAKE 1 TABLET (10 MG TOTAL) BY MOUTH EVERY EVENING. 30 tablet 7    triamcinolone acetonide 0.025% (KENALOG) 0.025 % cream Apply topically 2 (two) times daily as needed. Mild steroid- for flares of groin. 80 g 1    triamcinolone acetonide 0.1% (KENALOG) 0.1 % ointment AAA of arms bid prn itching. Moderate steroid- do not apply to face or groin. 30 g 1     No current facility-administered medications for this visit.        Family History   Problem Relation Age of Onset    Cancer Mother     Heart disease Father     Stroke Father     No Known Problems Sister     No Known Problems Brother     No Known Problems Maternal Aunt     No Known Problems Maternal Uncle     No Known Problems Paternal Aunt     No Known Problems Paternal Uncle     No Known Problems Maternal Grandmother     No Known Problems Maternal Grandfather     No Known Problems Paternal Grandmother     No Known Problems Paternal Grandfather     Amblyopia Neg Hx     Blindness Neg Hx     Cataracts Neg Hx     Diabetes Neg Hx     Glaucoma Neg Hx     Hypertension Neg Hx     Macular degeneration Neg Hx     Retinal detachment Neg Hx     Strabismus Neg Hx     Thyroid disease Neg Hx        Social History     Socioeconomic History    Marital status:      Spouse name: Not on file    Number of children: 2    Years of education: Not on file    Highest education level: Not on file "   Occupational History    Occupation: [employer]     Employer: [employer]   Social Needs    Financial resource strain: Not on file    Food insecurity:     Worry: Not on file     Inability: Not on file    Transportation needs:     Medical: Not on file     Non-medical: Not on file   Tobacco Use    Smoking status: Never Smoker    Smokeless tobacco: Never Used   Substance and Sexual Activity    Alcohol use: No     Alcohol/week: 0.0 oz     Frequency: Never    Drug use: No    Sexual activity: Not Currently   Lifestyle    Physical activity:     Days per week: Not on file     Minutes per session: Not on file    Stress: Not on file   Relationships    Social connections:     Talks on phone: Not on file     Gets together: Not on file     Attends [employer] service: Not on file     Active member of club or organization: Not on file     Attends meetings of clubs or organizations: Not on file     Relationship status: Not on file   Other Topics Concern    Not on file   Social History Narrative    . Lives with spouse. Has 2 children. 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       Review of Systems:  1. GENERAL: patient denies any fever, weight changes, generalized weakness, dizziness.  2. HEENT: patient denies headaches, visual disturbances, swallowing problems, sinus pain, nasal congestion.  3. CARDIOVASCULAR: patient denies chest pain, palpitations.  4. PULMONARY: patient reports SOB with exercise, no coughing, hemoptysis, wheezing.  5. GASTROINTESTINAL: patient denies abdominal pain, nausea, vomiting, diarrhea.  6. GENITOURINARY: patient denies dysuria, hematuria, hesitancy, frequency.  7. EXTREMITIES: patient reports LE edema, no LE cramping.  8. DERMATOLOGY: patient denies rashes, ulcers.  9. NEURO: patient denies tremors, extremity weakness, extremity numbness/tingling.  10. MUSCULOSKELETAL: patient denies joint pain, joint swelling.  11. HEMATOLOGY: patient  "denies rectal or gum bleeding.  12: PSYCH: patient denies anxiety, depression.      PHYSICAL EXAM:  /64   Pulse 80   Resp 20   Ht 5' 8" (1.727 m)   Wt 83.5 kg (184 lb 1.4 oz)   BMI 27.99 kg/m²     GENERAL: Pleasant gentleman presents to clinic with non-labored breathing.  HEENT: PER, no nasal discharge, no icterus, no oral exudates, moist mucosal membranes.  NECK: no thyroid mass, no lymphadenopathy.  HEART: RRR S1/S2, no rubs, good peripheral pulses.  LUNGS: CTA bilaterally, no wheezing, breathing is nonlabored.  ABDOMEN: soft, nontender, not distended, bowel sounds are present, no abdominal hernia  EXTREM: Trace LE edema.  SKIN: no rashes, skin is warm and dry.  NEURO: A & O x 3, no obvious focal signs.    LABORATORY RESULTS:    Lab Results   Component Value Date    CREATININE 2.7 (H) 07/15/2019    BUN 51 (H) 07/15/2019     07/15/2019    K 4.0 07/15/2019     07/15/2019    CO2 25 07/15/2019      Lab Results   Component Value Date    PTH 72.0 07/15/2019    CALCIUM 9.5 07/15/2019    PHOS 3.3 07/15/2019     Lab Results   Component Value Date    ALBUMIN 3.4 (L) 07/15/2019     Lab Results   Component Value Date    WBC 5.57 04/04/2019    HGB 11.6 (L) 04/04/2019    HCT 35.4 (L) 04/04/2019    MCV 86 04/04/2019     04/04/2019     Protein Creatinine Ratios:    Creatinine, Random Ur   Date Value Ref Range Status   07/15/2019 123.0 23.0 - 375.0 mg/dL Final     Comment:     The random urine reference ranges provided were established   for 24 hour urine collections.  No reference ranges exist for  random urine specimens.  Correlate clinically.     03/21/2019 239.0 23.0 - 375.0 mg/dL Final     Comment:     The random urine reference ranges provided were established   for 24 hour urine collections.  No reference ranges exist for  random urine specimens.  Correlate clinically.     02/26/2019 185.0 23.0 - 375.0 mg/dL Final     Comment:     The random urine reference ranges provided were established "   for 24 hour urine collections.  No reference ranges exist for  random urine specimens.  Correlate clinically.       Protein, Urine Random   Date Value Ref Range Status   07/15/2019 8 0 - 15 mg/dL Final     Comment:     The random urine reference ranges provided were established   for 24 hour urine collections.  No reference ranges exist for  random urine specimens.  Correlate clinically.     03/21/2019 281 (H) 0 - 15 mg/dL Final     Comment:     The random urine reference ranges provided were established   for 24 hour urine collections.  No reference ranges exist for  random urine specimens.  Correlate clinically.     02/26/2019 201 (H) 0 - 15 mg/dL Final     Comment:     The random urine reference ranges provided were established   for 24 hour urine collections.  No reference ranges exist for  random urine specimens.  Correlate clinically.       Prot/Creat Ratio, Ur   Date Value Ref Range Status   07/15/2019 0.07 0.00 - 0.20 Final   03/21/2019 1.18 (H) 0.00 - 0.20 Final   02/26/2019 1.09 (H) 0.00 - 0.20 Final             ASSESSMENT AND PLAN:  64 y.o. male with history of CKD 3/4, HTN, CHF, anemia, DM2, CAD, pulmonary hypertension, CHF presents to the renal clinic for evaluation of renal insufficiency.     1. Renal insufficiency: Patient had recent episode of BROOKE with peak creatinine of 8.2 on 11/15/18. Renal function has improved with IV fluids and creatinine has declined to 2.7. Patient likely suffers from mild cardiorenal syndrome (EF 20-25%) and creatinine has been fluctuating (in addition to diabetic nephropathy). Will continue Entresto for proteinuria.  Patient's renal function will be monitored closely and he will return to the clinic in 4 months for follow up. Patient is on fluid restrictions (about 40-50 ounces per day).     2. Electrolytes: at goal.     3. Acid base status:no issues.     4. Volume: Bilateral LE edema. Continue Bumex/metolazone (Cardiology is monitoring this closely).     5. Hypertension:  good BP control.     6. Medications: Reviewed. Agree with current medical regimen.    7. DM2: well controlled with last HgA1c at 5.2 (4/2/19).     8. CAD/CHF: As per Cardiology (Dr. Clavillo).     9. Hyperparathyroidism: vitamin D OTC.

## 2019-08-11 DIAGNOSIS — I50.9 ACUTE ON CHRONIC CONGESTIVE HEART FAILURE: ICD-10-CM

## 2019-08-12 RX ORDER — BUMETANIDE 2 MG/1
TABLET ORAL
Qty: 90 TABLET | Refills: 3 | Status: SHIPPED | OUTPATIENT
Start: 2019-08-12 | End: 2020-05-18 | Stop reason: SDUPTHER

## 2019-08-14 DIAGNOSIS — L30.4 INTERTRIGO: ICD-10-CM

## 2019-08-15 RX ORDER — TRIAMCINOLONE ACETONIDE 0.25 MG/G
CREAM TOPICAL 2 TIMES DAILY PRN
Qty: 80 G | Refills: 1 | Status: SHIPPED | OUTPATIENT
Start: 2019-08-15 | End: 2019-10-08

## 2019-08-15 NOTE — TELEPHONE ENCOUNTER
LVM informing pt wife we sent the refill request for the TAC 0.025% (Kenalog) cream to the provider for approval but the Hydroxzine (Atarax) still has refill on it.  Pt will need to contact the pharmacy for refills. Instructed wife to call clinic back with any questions.

## 2019-08-15 NOTE — TELEPHONE ENCOUNTER
----- Message from Citlaly Hogan sent at 8/15/2019 10:41 AM CDT -----  Contact: Mrs. Choudhury-Spouse   Type:  RX Refill Request    Who Called: Mrs. Choudhury   Refill or New Rx:refill  RX Name and Strength:triamcinolone acetonide 0.025% (KENALOG) 0.025 % cream-hydrOXYzine HCl (ATARAX) 10 MG Tab  How is the patient currently taking it? (ex. 1XDay):n/a   Is this a 30 day or 90 day RX:n/a  Preferred Pharmacy with phone number:  [facility]/pharmacy #[id number] - [place] - [address] AT AT [facility] [place]  [address] [facility] [address]  Phone: [phone] Fax: [phone]  Local or Mail Order:local  Ordering Provider:Dr. Mars  Would the patient rather a call back or a response via [facility]? call  Best Call Back Number:[phone]  Additional Information: n/a

## 2019-08-16 ENCOUNTER — OFFICE VISIT (OUTPATIENT)
Dept: OPHTHALMOLOGY | Facility: CLINIC | Age: 64
End: 2019-08-16
Payer: MEDICARE

## 2019-08-16 DIAGNOSIS — H40.1122 PRIMARY OPEN ANGLE GLAUCOMA (POAG) OF LEFT EYE, MODERATE STAGE: ICD-10-CM

## 2019-08-16 PROCEDURE — 99999 PR PBB SHADOW E&M-EST. PATIENT-LVL I: ICD-10-PCS | Mod: PBBFAC,,, | Performed by: OPHTHALMOLOGY

## 2019-08-16 PROCEDURE — 92012 PR EYE EXAM, EST PATIENT,INTERMED: ICD-10-PCS | Mod: S$PBB,,, | Performed by: OPHTHALMOLOGY

## 2019-08-16 PROCEDURE — 92012 INTRM OPH EXAM EST PATIENT: CPT | Mod: S$PBB,,, | Performed by: OPHTHALMOLOGY

## 2019-08-16 PROCEDURE — 99211 OFF/OP EST MAY X REQ PHY/QHP: CPT | Mod: PBBFAC | Performed by: OPHTHALMOLOGY

## 2019-08-16 PROCEDURE — 99999 PR PBB SHADOW E&M-EST. PATIENT-LVL I: CPT | Mod: PBBFAC,,, | Performed by: OPHTHALMOLOGY

## 2019-08-16 RX ORDER — BRIMONIDINE TARTRATE AND TIMOLOL MALEATE 2; 5 MG/ML; MG/ML
1 SOLUTION OPHTHALMIC EVERY 12 HOURS
Qty: 10 ML | Refills: 6 | Status: SHIPPED | OUTPATIENT
Start: 2019-08-16 | End: 2020-10-16 | Stop reason: SDUPTHER

## 2019-08-16 RX ORDER — BIMATOPROST 0.3 MG/ML
1 SOLUTION/ DROPS OPHTHALMIC NIGHTLY
Qty: 2.5 ML | Refills: 12 | Status: CANCELLED | OUTPATIENT
Start: 2019-08-16 | End: 2020-08-15

## 2019-08-16 RX ORDER — BIMATOPROST 0.3 MG/ML
1 SOLUTION/ DROPS OPHTHALMIC NIGHTLY
Qty: 2.5 ML | Refills: 11 | Status: SHIPPED | OUTPATIENT
Start: 2019-08-16 | End: 2020-10-16 | Stop reason: SDUPTHER

## 2019-08-16 RX ORDER — BIMATOPROST 0.3 MG/ML
1 SOLUTION/ DROPS OPHTHALMIC NIGHTLY
Qty: 2.5 ML | Refills: 12 | Status: SHIPPED | OUTPATIENT
Start: 2019-08-16 | End: 2019-10-08 | Stop reason: SDUPTHER

## 2019-08-16 NOTE — PROGRESS NOTES
"    ===============================  Yan Choudhury,   64 y.o. male   Last visit JC: :5/17/2019   Last visit eye dept. 5/17/2019  [facility]:  Corrected distance visual acuity was NLP in the right eye and 20/50 +1 in the left eye.  Tonometry     Tonometry (Applanation, 10:16 AM)       Right Left    Pressure  17               Not recorded         Not recorded         Not recorded        Chief Complaint   Patient presents with    Glaucoma     3 month iop check     Ophthalmic Medications     Ophthalmic-Intraocular Pressure Reducing Agents, Prostaglandin Analogs Start End     bimatoprost (LUMIGAN) 0.03 % ophthalmic drops    8/16/2019 8/15/2020    Sig: Place 1 drop into the left eye every evening.    Route: Left Eye     bimatoprost (LUMIGAN) 0.03 % ophthalmic drops (Discontinued)    5/17/2019 8/16/2019    Sig: Place 1 drop into the left eye every evening.    Route: Left Eye    Reason for Discontinue: Reorder           ________________  8/16/2019  HPI     Glaucoma      Additional comments: 3 month iop check              Comments     --DM since 2000  --H/O BDR and DME OS  H/o Av and Shiva 2014  --Focal OS June 2011  --VRTX/VMA OS "would not expect improvement with treatment secondary to   VRTX"  --Prosthesis OD secondary to trauma  --COAG   Resumed xalatan ou qhs 12/11/17  Tmax 25  (-1)  Gonio Open  Low RNFL  Last VF Bjerrum OS 12/11/17  HVF 5/17/19    Lumigan QHS OS          Last edited by Estella Forbes on 8/16/2019  9:37 AM. (History)      Problem List Items Addressed This Visit        Eye/Vision problems    Primary open-angle glaucoma, moderate stage    Relevant Medications    bimatoprost (LUMIGAN) 0.03 % ophthalmic drops        T 17   rtc 3 montsh dilate    Dm   Good    .       ===========================    "

## 2019-08-21 RX ORDER — LATANOPROST 50 UG/ML
1 SOLUTION/ DROPS OPHTHALMIC DAILY
Qty: 2.5 ML | Refills: 4 | Status: SHIPPED | OUTPATIENT
Start: 2019-08-21 | End: 2021-03-10

## 2019-08-21 RX ORDER — DORZOLAMIDE HYDROCHLORIDE AND TIMOLOL MALEATE 20; 5 MG/ML; MG/ML
1 SOLUTION/ DROPS OPHTHALMIC 2 TIMES DAILY
Qty: 10 ML | Refills: 2 | Status: SHIPPED | OUTPATIENT
Start: 2019-08-21 | End: 2021-03-10

## 2019-08-30 RX ORDER — ALLOPURINOL 100 MG/1
TABLET ORAL
Qty: 90 TABLET | Refills: 0 | Status: SHIPPED | OUTPATIENT
Start: 2019-08-30 | End: 2019-12-10 | Stop reason: SDUPTHER

## 2019-09-03 ENCOUNTER — CLINICAL SUPPORT (OUTPATIENT)
Dept: AUDIOLOGY | Facility: CLINIC | Age: 64
End: 2019-09-03
Payer: MEDICARE

## 2019-09-03 ENCOUNTER — OFFICE VISIT (OUTPATIENT)
Dept: OTOLARYNGOLOGY | Facility: CLINIC | Age: 64
End: 2019-09-03
Payer: MEDICARE

## 2019-09-03 ENCOUNTER — LAB VISIT (OUTPATIENT)
Dept: LAB | Facility: HOSPITAL | Age: 64
End: 2019-09-03
Attending: INTERNAL MEDICINE
Payer: MEDICARE

## 2019-09-03 VITALS
SYSTOLIC BLOOD PRESSURE: 124 MMHG | BODY MASS INDEX: 29.06 KG/M2 | DIASTOLIC BLOOD PRESSURE: 73 MMHG | HEART RATE: 93 BPM | WEIGHT: 191.13 LBS

## 2019-09-03 DIAGNOSIS — H90.3 HEARING LOSS, SENSORINEURAL, ASYMMETRICAL: Primary | ICD-10-CM

## 2019-09-03 DIAGNOSIS — I50.42 CHRONIC COMBINED SYSTOLIC AND DIASTOLIC CONGESTIVE HEART FAILURE: ICD-10-CM

## 2019-09-03 LAB
ALBUMIN SERPL BCP-MCNC: 3.5 G/DL (ref 3.5–5.2)
ALP SERPL-CCNC: 69 U/L (ref 55–135)
ALT SERPL W/O P-5'-P-CCNC: 7 U/L (ref 10–44)
ANION GAP SERPL CALC-SCNC: 9 MMOL/L (ref 8–16)
AST SERPL-CCNC: 22 U/L (ref 10–40)
BILIRUB SERPL-MCNC: 0.4 MG/DL (ref 0.1–1)
BNP SERPL-MCNC: 367 PG/ML (ref 0–99)
BUN SERPL-MCNC: 35 MG/DL (ref 8–23)
CALCIUM SERPL-MCNC: 9.7 MG/DL (ref 8.7–10.5)
CHLORIDE SERPL-SCNC: 104 MMOL/L (ref 95–110)
CO2 SERPL-SCNC: 27 MMOL/L (ref 23–29)
CREAT SERPL-MCNC: 2.3 MG/DL (ref 0.5–1.4)
EST. GFR  (AFRICAN AMERICAN): 33 ML/MIN/1.73 M^2
EST. GFR  (NON AFRICAN AMERICAN): 28.5 ML/MIN/1.73 M^2
GLUCOSE SERPL-MCNC: 133 MG/DL (ref 70–110)
POTASSIUM SERPL-SCNC: 4.4 MMOL/L (ref 3.5–5.1)
PROT SERPL-MCNC: 7.1 G/DL (ref 6–8.4)
SODIUM SERPL-SCNC: 140 MMOL/L (ref 136–145)

## 2019-09-03 PROCEDURE — 99203 PR OFFICE/OUTPT VISIT, NEW, LEVL III, 30-44 MIN: ICD-10-PCS | Mod: S$PBB,,, | Performed by: ORTHOPAEDIC SURGERY

## 2019-09-03 PROCEDURE — 92567 TYMPANOMETRY: CPT | Mod: PBBFAC | Performed by: AUDIOLOGIST

## 2019-09-03 PROCEDURE — 99999 PR PBB SHADOW E&M-EST. PATIENT-LVL III: CPT | Mod: PBBFAC,,, | Performed by: ORTHOPAEDIC SURGERY

## 2019-09-03 PROCEDURE — 80053 COMPREHEN METABOLIC PANEL: CPT

## 2019-09-03 PROCEDURE — 83880 ASSAY OF NATRIURETIC PEPTIDE: CPT

## 2019-09-03 PROCEDURE — 92557 COMPREHENSIVE HEARING TEST: CPT | Mod: PBBFAC | Performed by: AUDIOLOGIST

## 2019-09-03 PROCEDURE — 36415 COLL VENOUS BLD VENIPUNCTURE: CPT

## 2019-09-03 PROCEDURE — 99213 OFFICE O/P EST LOW 20 MIN: CPT | Mod: PBBFAC,25 | Performed by: ORTHOPAEDIC SURGERY

## 2019-09-03 PROCEDURE — 99999 PR PBB SHADOW E&M-EST. PATIENT-LVL III: ICD-10-PCS | Mod: PBBFAC,,, | Performed by: ORTHOPAEDIC SURGERY

## 2019-09-03 PROCEDURE — 99203 OFFICE O/P NEW LOW 30 MIN: CPT | Mod: S$PBB,,, | Performed by: ORTHOPAEDIC SURGERY

## 2019-09-03 NOTE — PROGRESS NOTES
Yan Choudhury was seen 2019 for an audiological evaluation.  Patient complains of his right ear feeling stopped up with onset 2 month ago. He does not appreciate any  in hearing. He denies any tinnitus or dizziness.     Results reveal a mild sensorineural hearing loss 3187-5528 Hz for the right ear, and  normal   hearing 250-8000 Hz for the left ear.   Speech Reception Thresholds were  15 dBHL for the right ear and 15 dBHL for the left ear.   Word recognition scores were good for the right ear and excellent for the left ear.   Tympanograms were Type Ad for the right ear and Type Ad, hypermobile for the left ear.    Patient was counseled on the above findings.    Recommendations include:    1.  ENT followup  2.  Recheck per ENT  3.  Wear hearing protective devices around loud noise  4.  Annual audiograms

## 2019-09-04 NOTE — PROGRESS NOTES
Subjective:       Patient ID: Yan Choudhury is a 64 y.o. male.    Chief Complaint: Ear Fullness (right x few months)    Patient is a very pleasant 64 y.o. male here to see me today for the first time for evaluation of hearing loss.  He reports hearing loss that has been gradually progressing over the last several months.  He has noted a difference in hearing between the ears, with the left ear being the better hearing ear.  He has noted any tinnitus in the right ear.  He has not had any recent issues with ear pain or ear drainage.  He denies a family history of hearing loss, and has not had any previous otologic surgery.  He denies any history of significant loud noise exposure.  He denies issues with dizziness.      Review of Systems   Constitutional: Negative for fatigue, fever and unexpected weight change.   HENT: Positive for hearing loss (right ear). Negative for congestion, ear discharge, ear pain, facial swelling, nosebleeds, postnasal drip, rhinorrhea, sinus pressure, sneezing, sore throat, tinnitus, trouble swallowing and voice change.    Eyes: Negative for discharge, redness and itching. Visual disturbance: right orbital prosthesis.   Respiratory: Negative for cough, choking, shortness of breath and wheezing.    Cardiovascular: Negative for chest pain and palpitations.   Gastrointestinal: Negative for abdominal pain.        No reflux.   Musculoskeletal: Negative for neck pain.   Neurological: Negative for dizziness, facial asymmetry, light-headedness and headaches.   Hematological: Negative for adenopathy. Does not bruise/bleed easily.   Psychiatric/Behavioral: Negative for agitation, behavioral problems, confusion and decreased concentration.       Objective:      Physical Exam   Constitutional: He is oriented to person, place, and time. Vital signs are normal. He appears well-developed and well-nourished. No distress.   HENT:   Head: Normocephalic and atraumatic.   Right Ear: Hearing, tympanic membrane,  external ear and ear canal normal.   Left Ear: Hearing, tympanic membrane, external ear and ear canal normal.   Nose: Nose normal. No mucosal edema, rhinorrhea, nasal deformity or septal deviation.   Mouth/Throat: Uvula is midline, oropharynx is clear and moist and mucous membranes are normal. No trismus in the jaw. Normal dentition. No uvula swelling. No oropharyngeal exudate or posterior oropharyngeal edema.   Eyes: Left eye exhibits no chemosis. Left conjunctiva is not injected. No scleral icterus. Left eye exhibits normal extraocular motion.   Right orbital prosthesis   Neck: Trachea normal and phonation normal. No tracheal tenderness present. No tracheal deviation present. No thyroid mass and no thyromegaly present.   Cardiovascular: Intact distal pulses.   Pulmonary/Chest: Effort normal. No accessory muscle usage or stridor. No respiratory distress.   Lymphadenopathy:        Head (right side): No submental, no submandibular, no preauricular and no posterior auricular adenopathy present.        Head (left side): No submental, no submandibular, no preauricular and no posterior auricular adenopathy present.     He has no cervical adenopathy.        Right cervical: No superficial cervical and no deep cervical adenopathy present.       Left cervical: No superficial cervical and no deep cervical adenopathy present.   Neurological: He is alert and oriented to person, place, and time. No cranial nerve deficit.   Skin: Skin is warm and dry. No rash noted. No erythema.   Psychiatric: He has a normal mood and affect. His behavior is normal. Thought content normal.       AUDIOGRAM:  Results reveal a mild sensorineural hearing loss 7298-3839 Hz for the right ear, and  normal   hearing 250-8000 Hz for the left ear.   Speech Reception Thresholds were  15 dBHL for the right ear and 15 dBHL for the left ear.   Word recognition scores were good for the right ear and excellent for the left ear.   Tympanograms were Type Ad for  the right ear and Type Ad, hypermobile for the left ear.      Assessment:       1. Asymmetrical right sensorineural hearing loss        Plan:       1.  Asymmetric right SNHL:  Asymmetry in the high frequencies of the right ear, but he cannot have MRI due to pacemaker.  Will order CT temporal bones, though less sensitive.  I would recommend annual audiograms.

## 2019-09-11 ENCOUNTER — CLINICAL SUPPORT (OUTPATIENT)
Dept: CARDIOLOGY | Facility: CLINIC | Age: 64
End: 2019-09-11
Attending: INTERNAL MEDICINE
Payer: MEDICARE

## 2019-09-11 ENCOUNTER — OFFICE VISIT (OUTPATIENT)
Dept: CARDIOLOGY | Facility: CLINIC | Age: 64
End: 2019-09-11
Payer: MEDICARE

## 2019-09-11 VITALS
HEIGHT: 68 IN | HEART RATE: 83 BPM | BODY MASS INDEX: 29.07 KG/M2 | DIASTOLIC BLOOD PRESSURE: 68 MMHG | SYSTOLIC BLOOD PRESSURE: 122 MMHG | WEIGHT: 191.81 LBS

## 2019-09-11 DIAGNOSIS — I44.7 LBBB (LEFT BUNDLE BRANCH BLOCK): ICD-10-CM

## 2019-09-11 DIAGNOSIS — I25.5 ISCHEMIC CARDIOMYOPATHY: ICD-10-CM

## 2019-09-11 DIAGNOSIS — Z95.810 BIVENTRICULAR ICD (IMPLANTABLE CARDIOVERTER-DEFIBRILLATOR) IN PLACE: Primary | Chronic | ICD-10-CM

## 2019-09-11 DIAGNOSIS — I50.42 CHRONIC COMBINED SYSTOLIC AND DIASTOLIC CONGESTIVE HEART FAILURE: ICD-10-CM

## 2019-09-11 DIAGNOSIS — Z95.810 ICD (IMPLANTABLE CARDIOVERTER-DEFIBRILLATOR) IN PLACE: ICD-10-CM

## 2019-09-11 DIAGNOSIS — I47.29 NSVT (NONSUSTAINED VENTRICULAR TACHYCARDIA): ICD-10-CM

## 2019-09-11 PROCEDURE — 93284 ICD PROGRAMMING: ICD-10-PCS | Mod: 26,S$PBB,, | Performed by: INTERNAL MEDICINE

## 2019-09-11 PROCEDURE — 99213 OFFICE O/P EST LOW 20 MIN: CPT | Mod: PBBFAC,25 | Performed by: INTERNAL MEDICINE

## 2019-09-11 PROCEDURE — 93284 PRGRMG EVAL IMPLANTABLE DFB: CPT | Mod: PBBFAC | Performed by: INTERNAL MEDICINE

## 2019-09-11 PROCEDURE — 99999 PR PBB SHADOW E&M-EST. PATIENT-LVL III: ICD-10-PCS | Mod: PBBFAC,,, | Performed by: INTERNAL MEDICINE

## 2019-09-11 PROCEDURE — 99999 PR PBB SHADOW E&M-EST. PATIENT-LVL III: CPT | Mod: PBBFAC,,, | Performed by: INTERNAL MEDICINE

## 2019-09-11 PROCEDURE — 99214 OFFICE O/P EST MOD 30 MIN: CPT | Mod: S$PBB,,, | Performed by: INTERNAL MEDICINE

## 2019-09-11 PROCEDURE — 93290 INTERROG DEV EVAL ICPMS IP: CPT | Mod: 26,S$PBB,, | Performed by: INTERNAL MEDICINE

## 2019-09-11 PROCEDURE — 99214 PR OFFICE/OUTPT VISIT, EST, LEVL IV, 30-39 MIN: ICD-10-PCS | Mod: S$PBB,,, | Performed by: INTERNAL MEDICINE

## 2019-09-11 PROCEDURE — 93290 ICD PROGRAMMING: ICD-10-PCS | Mod: 26,S$PBB,, | Performed by: INTERNAL MEDICINE

## 2019-09-11 NOTE — PROGRESS NOTES
Subjective:    Patient ID:  Yan Choudhury is a 64 y.o. male who presents for follow-up of NSVT (nonsustained ventricular tachycardia) and Cardiomyopathy      64 yoM CAD, ICM, LBBB here for post CRT-D visit.     Prior visit: He has history of CAD and ischemic cardiomyopathy. He was seen 7/15 for ICD/CRT-D consideration but has narrow complex QRS with IVCD pattern.  He has since had worsening HF symptoms. His echo 12/15 showed newly depressed EF from 40 to 25%. A PET stress revealed no reversible ischemia with EF 23%. His ECG showed LBBB with  ms. He has NYHA Class II symptoms- fatigue, dyspnea. No palpitations or syncope. He is here for CRT-D consideration. He has been on appropriate medical therapy for HF including coreg and lisinopril. He has CKD stage III, was worse last year, but never underwent dialysis, fistula placement or central access placement.     Successful CRT-D 3/24/16 without complications. He has since suffered from URI with GI upset. He is still under treatment. Device interrogation today shows normal CRT-D function with no arrhythmias.     6/16: He underwent GI surgery for diverticulitis that had to be converted to a colostomy. He continues to have drainage from his operative site in his abdomen. He has a remote monitor check set up for 7/11/16. The patient denies interval chest pain, sob, palpitations, syncope, near syncope.     6/17: CRT-D interrogation shows no arrhythmias and normal function 5/15/17. HF symptoms stable, improved from baseline.     Interval history: Normal CRT-D function with no sustained arrhythmias. Stable HF symptoms.     NM Stress 6/19:  Nuclear Quantitative Functional Analysis:   LVEF: 28 %    Impression: ABNORMAL MYOCARDIAL PERFUSION  1. There is mild to moderate intensity fixed defect in the anteroapical wall of the left ventricle, consistent with myocardial injury, moderate intensity fixed defects in the apical and anterolateral walls of the left ventricle,  consistent with   myocardial injury, and a moderate size fixed defect of severe intensity that extends from the base to the apical inferolateral wall of the left ventricle, consistent with myocardial injury. There is trivial-mild sweta-injury ischemia.   2. There is abnormal wall motion at rest showing akinesis of the inferolateral wall of the left ventricle.   3. There is resting LV dysfunction with a reduced ejection fraction of 28 %.   4. The ventricular volumes are normal at rest and stress.   5. The extracardiac distribution of radioactivity is normal.     Echo 4/19:  CONCLUSIONS     1 - Moderate left ventricular enlargement.     2 - Eccentric hypertrophy.     3 - No wall motion abnormalities.     4 - Severely depressed left ventricular systolic function (EF 15-20%).     5 - Right ventricular enlargement with moderately depressed systolic function.     6 - Pulmonary hypertension. The estimated PA systolic pressure is 77 mmHg.     7 - Moderate aortic regurgitation.     8 - Mild mitral regurgitation.     9 - Mild to moderate tricuspid regurgitation.     10 - Increased central venous pressure.     12/15:  CONCLUSIONS   1 - Moderate left atrial enlargement.   2 - Eccentric hypertrophy.   3 - Severely depressed left ventricular systolic function (EF 25-30%).   4 - Mildly depressed right ventricular systolic function .   5 - The estimated PA systolic pressure is 40 mmHg.   6 - Mild aortic regurgitation.   7 - Mild mitral regurgitation.   8 - Moderate tricuspid regurgitation.   9 - Trivial to mild pulmonic regurgitation.     PET Stress 2/16:  CONCLUSIONS: ABNORMAL MYOCARDIAL PERFUSION PET STRESS TEST  1. There is a moderate sized moderate to severe intensity fixed defect consistent with non-transmural infarct with sweta-infarct ischemia in the base to distal inferior and base to distal inferolateral walls in the usual distribution of the mid Left Circumflex Artery and ostial Posterior Lateral Branch. This defect  comprises 15 % of the left ventricular myocardium.   2. There is no ischemia in the Left Anterior Descending Artery territory.  3. There is abnormal wall motion at rest showing moderate global hypokinesis of the left ventricle and moderate hypokinesis of the inferior wall of the left ventricle. There is abnormal wall motion at stress showing moderate global hypokinesis of the left ventricle and severe hypokinesis of the inferior wall of the left ventricle.   4. There is resting LV dysfunction with a reduced ejection fraction of 23 %. There is stress induced LV dysfunction with a reduced ejection fraction of 34 %. (normal is >= 51%)  5. The ventricular volumes are normal at rest and stress.   6. The extracardiac distribution of radioactivity is normal.   7. There was no previous study available to compare.    Past Medical History:  No date: Arthritis  No date: CAD (coronary artery disease)  No date: Cataract  3/24/2015: Chronic combined systolic and diastolic congestive heart   failure  No date: CKD (chronic kidney disease), stage III  No date: Diabetes mellitus type II      Comment:   AM  No date: Diabetic retinopathy      Comment:  anti Veg F injections for macular edema  No date: Diverticulitis of colon  No date: ED (erectile dysfunction)  No date: Encounter for blood transfusion  No date: Glaucoma  No date: HTN (hypertension)  No date: Hyperlipidemia  No date: Ischemic cardiomyopathy  No date: Obesity  No date: JAHAIRA on CPAP  No date: Pacemaker  No date: Pulmonary HTN    Past Surgical History:  2009: CARDIAC CATHETERIZATION  No date: CHOLECYSTECTOMY  1/22/2019: CLOSURE, ILEOSTOMY; N/A      Comment:  Performed by Kevin Lindsay MD at [facility] OR  4/22/2016: COLECTOMY-SIGMOID; N/A      Comment:  Performed by Kevin Lindsay MD at [facility] OR  No date: COLON SURGERY      Comment:  Sigmoid resection  10/11/2018: COLONOSCOPY; N/A      Comment:  Performed by Quinn Aragon MD at [facility] ENDO  4/22/2016: COLOSTOMY;  N/A      Comment:  Performed by Kevin Lindsay MD at [facility] OR  10/30/2018: CREATION, ILEOSTOMY; N/A      Comment:  Performed by Kevin Lindsay MD at [facility] OR  No date: EYE SURGERY  10/30/2018: HEMICOLECTOMY, RIGHT; Right      Comment:  Performed by Kevin Lindsay MD at [facility] OR  01/2019: Ileostomy reversal  No date: KNEE SURGERY  10/30/2018: MOBILIZATION, SPLENIC FLEXURE; N/A      Comment:  Performed by Kevin Lindsay MD at [facility] OR  10/30/2018: REVISION OR CLOSURE, COLOSTOMY; N/A      Comment:  Performed by Kevni Lindsay MD at [facility] OR  1/18/2019: SIGMOIDOSCOPY, FLEXIBLE; N/A      Comment:  Performed by Quinn Aragon MD at [facility] ENDO    Social History    Socioeconomic History      Marital status:       Spouse name: Not on file      Number of children: 2      Years of education: Not on file      Highest education level: Not on file    Occupational History      Occupation: [employer]        Employer: [employer]    Social Needs      Financial resource strain: Not on file      Food insecurity:        Worry: Not on file        Inability: Not on file      Transportation needs:        Medical: Not on file        Non-medical: Not on file    Tobacco Use      Smoking status: Never Smoker      Smokeless tobacco: Never Used    Substance and Sexual Activity      Alcohol use: No        Alcohol/week: 0.0 oz        Frequency: Never      Drug use: No      Sexual activity: Not Currently    Lifestyle      Physical activity:        Days per week: Not on file        Minutes per session: Not on file      Stress: Not on file    Relationships      Social connections:        Talks on phone: Not on file        Gets together: Not on file        Attends Hinduism service: Not on file        Active member of club or organization: Not on file        Attends meetings of clubs or organizations: Not on file        Relationship status: Not on file    Other Topics      Concerns:        Not on file    Social  History Narrative      . Lives with spouse. Has 2 children. 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      Review of patient's family history indicates:  Problem: Cancer      Relation: Mother          Age of Onset: (Not Specified)  Problem: Heart disease      Relation: Father          Age of Onset: (Not Specified)  Problem: Stroke      Relation: Father          Age of Onset: (Not Specified)  Problem: No Known Problems      Relation: Sister          Age of Onset: (Not Specified)  Problem: No Known Problems      Relation: Brother          Age of Onset: (Not Specified)  Problem: No Known Problems      Relation: Maternal Aunt          Age of Onset: (Not Specified)  Problem: No Known Problems      Relation: Maternal Uncle          Age of Onset: (Not Specified)  Problem: No Known Problems      Relation: Paternal Aunt          Age of Onset: (Not Specified)  Problem: No Known Problems      Relation: Paternal Uncle          Age of Onset: (Not Specified)  Problem: No Known Problems      Relation: Maternal Grandmother          Age of Onset: (Not Specified)  Problem: No Known Problems      Relation: Maternal Grandfather          Age of Onset: (Not Specified)  Problem: No Known Problems      Relation: Paternal Grandmother          Age of Onset: (Not Specified)  Problem: No Known Problems      Relation: Paternal Grandfather          Age of Onset: (Not Specified)  Problem: Amblyopia      Relation: Neg Hx          Age of Onset: (Not Specified)  Problem: Blindness      Relation: Neg Hx          Age of Onset: (Not Specified)  Problem: Cataracts      Relation: Neg Hx          Age of Onset: (Not Specified)  Problem: Diabetes      Relation: Neg Hx          Age of Onset: (Not Specified)  Problem: Glaucoma      Relation: Neg Hx          Age of Onset: (Not Specified)  Problem: Hypertension      Relation: Neg Hx          Age of Onset: (Not Specified)  Problem: Macular degeneration      Relation: Neg Hx           Age of Onset: (Not Specified)  Problem: Retinal detachment      Relation: Neg Hx          Age of Onset: (Not Specified)  Problem: Strabismus      Relation: Neg Hx          Age of Onset: (Not Specified)  Problem: Thyroid disease      Relation: Neg Hx          Age of Onset: (Not Specified)        Review of Systems   Constitution: Negative for malaise/fatigue.   HENT: Negative.    Eyes: Negative.    Cardiovascular: Positive for dyspnea on exertion. Negative for chest pain, leg swelling, near-syncope, orthopnea, palpitations and syncope.   Respiratory: Negative.    Endocrine: Negative.    Hematologic/Lymphatic: Negative.    Skin: Negative.    Musculoskeletal: Negative.    Gastrointestinal: Positive for bloating and diarrhea.        Ostomy bag   Genitourinary: Negative.    Neurological: Negative.  Negative for weakness.   Psychiatric/Behavioral: Negative.    Allergic/Immunologic: Negative.         Objective:    Physical Exam   Constitutional: He is oriented to person, place, and time. He appears well-developed and well-nourished. No distress.   HENT:   Head: Normocephalic and atraumatic.   Mouth/Throat: No oropharyngeal exudate.   Eyes: Pupils are equal, round, and reactive to light. Conjunctivae and EOM are normal. Right eye exhibits no discharge. Left eye exhibits no discharge.   Neck: Normal range of motion. Neck supple. No JVD present. No thyromegaly present.   Cardiovascular: Normal rate, regular rhythm and normal heart sounds.   No murmur heard.  Pulmonary/Chest: Effort normal and breath sounds normal. No respiratory distress. He has no wheezes.   L upper chest wound, healing well with underlying generator   Abdominal: Soft. Bowel sounds are normal. He exhibits no distension. There is no tenderness.   Ostomy bag LLQ   Musculoskeletal: Normal range of motion. He exhibits no edema.   Neurological: He is alert and oriented to person, place, and time. No cranial nerve deficit.   Skin: Skin is warm and dry. No rash  noted. He is not diaphoretic. No erythema.   Psychiatric: He has a normal mood and affect. His behavior is normal. Judgment and thought content normal.   Vitals reviewed.        Assessment:       1. Biventricular ICD (implantable cardioverter-defibrillator) in place    2. Chronic combined systolic and diastolic congestive heart failure    3. LBBB (left bundle branch block)         Plan:       64 yoM NICM, LBBB, CRT-D here for routine follow up. Normal CRT-D function with no sustained arrhythmias. I discussed routine device follow up including quarterly to bi-annual device checks for device function as well as yearly follow up in the EP clinic. The patient  was advised to call with any concerns regarding their device. Device clinic follow up as scheduled. RTC 1y

## 2019-09-17 ENCOUNTER — HOSPITAL ENCOUNTER (OUTPATIENT)
Dept: RADIOLOGY | Facility: HOSPITAL | Age: 64
Discharge: HOME | End: 2019-09-17
Attending: ORTHOPAEDIC SURGERY
Payer: MEDICARE

## 2019-09-17 ENCOUNTER — OFFICE VISIT (OUTPATIENT)
Dept: CARDIOLOGY | Facility: CLINIC | Age: 64
End: 2019-09-17
Payer: MEDICARE

## 2019-09-17 VITALS
HEIGHT: 68 IN | HEART RATE: 66 BPM | SYSTOLIC BLOOD PRESSURE: 122 MMHG | BODY MASS INDEX: 28.44 KG/M2 | WEIGHT: 187.63 LBS | OXYGEN SATURATION: 97 % | DIASTOLIC BLOOD PRESSURE: 60 MMHG

## 2019-09-17 DIAGNOSIS — E11.69 HYPERLIPIDEMIA ASSOCIATED WITH TYPE 2 DIABETES MELLITUS: ICD-10-CM

## 2019-09-17 DIAGNOSIS — I27.20 PULMONARY HTN: ICD-10-CM

## 2019-09-17 DIAGNOSIS — R94.31 ABNORMAL ECG: ICD-10-CM

## 2019-09-17 DIAGNOSIS — E78.5 HYPERLIPIDEMIA ASSOCIATED WITH TYPE 2 DIABETES MELLITUS: ICD-10-CM

## 2019-09-17 DIAGNOSIS — I25.5 ISCHEMIC CARDIOMYOPATHY: ICD-10-CM

## 2019-09-17 DIAGNOSIS — N18.30 TYPE 2 DIABETES MELLITUS WITH STAGE 3 CHRONIC KIDNEY DISEASE, WITH LONG-TERM CURRENT USE OF INSULIN: ICD-10-CM

## 2019-09-17 DIAGNOSIS — I50.42 CHRONIC COMBINED SYSTOLIC AND DIASTOLIC CONGESTIVE HEART FAILURE: Primary | ICD-10-CM

## 2019-09-17 DIAGNOSIS — I15.2 HYPERTENSION ASSOCIATED WITH DIABETES: ICD-10-CM

## 2019-09-17 DIAGNOSIS — I47.29 NSVT (NONSUSTAINED VENTRICULAR TACHYCARDIA): ICD-10-CM

## 2019-09-17 DIAGNOSIS — Z79.4 TYPE 2 DIABETES MELLITUS WITH STAGE 3 CHRONIC KIDNEY DISEASE, WITH LONG-TERM CURRENT USE OF INSULIN: ICD-10-CM

## 2019-09-17 DIAGNOSIS — I25.10 CAD, MULTIPLE VESSEL: ICD-10-CM

## 2019-09-17 DIAGNOSIS — I44.7 LBBB (LEFT BUNDLE BRANCH BLOCK): ICD-10-CM

## 2019-09-17 DIAGNOSIS — D63.1 ANEMIA DUE TO STAGE 3 CHRONIC KIDNEY DISEASE: ICD-10-CM

## 2019-09-17 DIAGNOSIS — N18.30 ANEMIA DUE TO STAGE 3 CHRONIC KIDNEY DISEASE: ICD-10-CM

## 2019-09-17 DIAGNOSIS — I35.1 NONRHEUMATIC AORTIC VALVE INSUFFICIENCY: ICD-10-CM

## 2019-09-17 DIAGNOSIS — E11.59 HYPERTENSION ASSOCIATED WITH DIABETES: ICD-10-CM

## 2019-09-17 DIAGNOSIS — Z95.810 BIVENTRICULAR ICD (IMPLANTABLE CARDIOVERTER-DEFIBRILLATOR) IN PLACE: Chronic | ICD-10-CM

## 2019-09-17 DIAGNOSIS — R94.39 ABNORMAL STRESS TEST: ICD-10-CM

## 2019-09-17 DIAGNOSIS — N18.30 CKD (CHRONIC KIDNEY DISEASE) STAGE 3, GFR 30-59 ML/MIN: ICD-10-CM

## 2019-09-17 DIAGNOSIS — E11.22 TYPE 2 DIABETES MELLITUS WITH STAGE 3 CHRONIC KIDNEY DISEASE, WITH LONG-TERM CURRENT USE OF INSULIN: ICD-10-CM

## 2019-09-17 PROCEDURE — 70480 CT ORBIT/EAR/FOSSA W/O DYE: CPT | Mod: TC

## 2019-09-17 PROCEDURE — 70480 CT ORBIT/EAR/FOSSA W/O DYE: CPT | Mod: 26,,, | Performed by: RADIOLOGY

## 2019-09-17 PROCEDURE — 99999 PR PBB SHADOW E&M-EST. PATIENT-LVL III: ICD-10-PCS | Mod: PBBFAC,,, | Performed by: INTERNAL MEDICINE

## 2019-09-17 PROCEDURE — 70480 CT TEMPORAL BONE WITHOUT CONTRAST: ICD-10-PCS | Mod: 26,,, | Performed by: RADIOLOGY

## 2019-09-17 PROCEDURE — 99213 OFFICE O/P EST LOW 20 MIN: CPT | Mod: PBBFAC,25 | Performed by: INTERNAL MEDICINE

## 2019-09-17 PROCEDURE — 99214 PR OFFICE/OUTPT VISIT, EST, LEVL IV, 30-39 MIN: ICD-10-PCS | Mod: S$PBB,,, | Performed by: INTERNAL MEDICINE

## 2019-09-17 PROCEDURE — 99214 OFFICE O/P EST MOD 30 MIN: CPT | Mod: S$PBB,,, | Performed by: INTERNAL MEDICINE

## 2019-09-17 PROCEDURE — 99999 PR PBB SHADOW E&M-EST. PATIENT-LVL III: CPT | Mod: PBBFAC,,, | Performed by: INTERNAL MEDICINE

## 2019-09-17 RX ORDER — SIMVASTATIN 10 MG/1
10 TABLET, FILM COATED ORAL NIGHTLY
Qty: 90 TABLET | Refills: 4 | Status: SHIPPED | OUTPATIENT
Start: 2019-09-17 | End: 2020-10-04

## 2019-09-17 NOTE — PROGRESS NOTES
Subjective:    Patient ID:  Yan Choudhury is a 64 y.o. male who presents for evaluation of Follow-up (3 month f/u); Coronary Artery Disease; Congestive Heart Failure; Hypertension; Hyperlipidemia; and Risk Factor Management For Atherosclerosis      HPI  Pt presents for f/u.   His current medical conditions include CHF, CAD/ICM, DCM, LBBB, HTN, CRI, DM, CRT-ICD, PTHN, dyslipidemia, obesity. Nonsmoker.   Past hx pertinent for following:  LHC was done 2009 showed diffuse CAD, and severe distal CAD involving apical LAD, distal RCA/distal LCX.   Echo Oct 2010 showed LVEF 25%.   PET stress 2016 showed inferior scar, no significant ischemia noted and LVEF < 35%.   s/p BIV ICD 3/16 for CHF/LBBB.  S/p admission 4/19 for acute on chronic CHF exacerbation.  Echo April 19 EF 15 - 20%, LVE, mod AI, mild-mod TR, mild MR, severe PHTN 77 mmHg.  ecg 4/2/19 V pacing.  ICD interrogation 3/19 normal device function, NSVT.  Stress MPI 6/19 multivessel scar, trivial ischemia, EF 28%.  Now here.   CAD seems stable. No active anginal sxs on current meds.  Denies chest pain sxs.  CHF is clinically stable. He has chronic LIM, unchanged.  Denies pnd/orthonea. No leg edema.  Weight is up 10 pounds from last appt few months ago and wife is giving him the usual extra Metolazone.  ICD well functioning.  CRI stable, creatinine 2.3  Has been off statin for unclear reasons.  DM well controlled.   9/19 labs.  HTN controlled.  Lipids stable.         Current Outpatient Medications:     allopurinol (ZYLOPRIM) 100 MG tablet, TAKE 1 TABLET (100 MG TOTAL) BY MOUTH ONCE DAILY., Disp: 90 tablet, Rfl: 0    aspirin (ECOTRIN) 81 MG EC tablet, Take 81 mg by mouth every morning. , Disp: , Rfl:     BD INSULIN SYRINGE ULTRA-FINE 0.5 mL 31 gauge x 5/16 Syrg, USE AS DIRECTED TO INJECT INSULIN TWO TIMES DAILY, Disp: 60 each, Rfl: 11    bimatoprost (LUMIGAN) 0.03 % ophthalmic drops, Place 1 drop into the left eye every evening., Disp: 2.5 mL, Rfl: 11     "bimatoprost (LUMIGAN) 0.03 % ophthalmic drops, Place 1 drop into the left eye every evening., Disp: 2.5 mL, Rfl: 12    blood sugar diagnostic Strp, 1 strip by Misc.(Non-Drug; Combo Route) route 4 (four) times daily. [employer]care, Disp: 120 strip, Rfl: 11    brimonidine-timolol (COMBIGAN) 0.2-0.5 % Drop, Place 1 drop into both eyes every 12 (twelve) hours., Disp: 10 mL, Rfl: 6    bumetanide (BUMEX) 2 MG tablet, TAKE 1 TABLET (2 MG TOTAL) BY MOUTH ONCE DAILY., Disp: 90 tablet, Rfl: 3    cholecalciferol, vitamin D3, (VITAMIN D3) 1,000 unit capsule, Take 1,000 Units by mouth once daily., Disp: , Rfl:     dorzolamide-timolol 2-0.5% (COSOPT) 22.3-6.8 mg/mL ophthalmic solution, Place 1 drop into both eyes 2 (two) times daily., Disp: 10 mL, Rfl: 2    hydrOXYzine HCl (ATARAX) 10 MG Tab, PLEASE SEE ATTACHED FOR DETAILED DIRECTIONS, Disp: 30 tablet, Rfl: 2    insulin glargine (LANTUS) 100 unit/mL injection, 50 units bid prn when BS< 150, Disp: 30 mL, Rfl: 11    IRON/VITAMIN B COMPLEX (GERITOL ORAL), Take by mouth., Disp: , Rfl:     ketoconazole (NIZORAL) 2 % cream, AAA bid prn rash of groin. For Flares., Disp: 60 g, Rfl: 3    lancets [facility] [place], For tel care, Disp: 120 each, Rfl: 11    latanoprost (XALATAN) 0.005 % ophthalmic solution, Place 1 drop into both eyes once daily., Disp: 2.5 mL, Rfl: 4    metOLazone (ZAROXOLYN) 5 MG tablet, Take 1 tablet (5 mg total) by mouth daily as needed., Disp: 30 tablet, Rfl: 0    miconazole NITRATE 2 % (ZEASORB AF) 2 % top powder, Use two to three times daily to prevent rash, Disp: 85 g, Rfl: 11    nystatin (MYCOSTATIN) cream, Apply topically 2 (two) times daily. Apply to bilateral groins, Disp: 30 g, Rfl: 1    pantoprazole (PROTONIX) 40 MG tablet, TAKE 1 TABLET BY MOUTH EVERY DAY FOR ACID REFLUX, Disp: 90 tablet, Rfl: 1    pen needle, diabetic (BD ULTRA-FINE JOSLYN PEN NEEDLE) 32 gauge x 5/32" Ndle, 1 each by Misc.(Non-Drug; Combo Route) route 4 (four) times daily., Disp: 100 each, Rfl: " "11    sacubitril-valsartan (ENTRESTO) 24-26 mg per tablet, Take 1 tablet by mouth 2 (two) times daily., Disp: 60 tablet, Rfl: 12    simvastatin (ZOCOR) 10 MG tablet, Take 1 tablet (10 mg total) by mouth every evening., Disp: 90 tablet, Rfl: 4    triamcinolone acetonide 0.025% (KENALOG) 0.025 % cream, Apply topically 2 (two) times daily as needed. Mild steroid- for flares of groin., Disp: 80 g, Rfl: 1    triamcinolone acetonide 0.025% (KENALOG) 0.025 % cream, APPLY TOPICALLY 2 (TWO) TIMES DAILY AS NEEDED. MILD STEROID, Disp: 80 g, Rfl: 1    triamcinolone acetonide 0.1% (KENALOG) 0.1 % ointment, AAA of arms bid prn itching. Moderate steroid- do not apply to face or groin., Disp: 30 g, Rfl: 1        Review of Systems   Constitution: Positive for weight gain.   HENT: Negative.    Eyes: Negative.    Cardiovascular: Positive for dyspnea on exertion.   Respiratory: Positive for shortness of breath.    Endocrine: Negative.    Hematologic/Lymphatic: Negative.    Skin: Negative.    Musculoskeletal: Negative.    Gastrointestinal: Negative.    Genitourinary: Negative.    Neurological: Negative.    Psychiatric/Behavioral: Negative.    Allergic/Immunologic: Negative.        /60 (BP Location: Right arm, Patient Position: Sitting, BP Method: Large (Manual))   Pulse 66   Ht 5' 8" (1.727 m)   Wt 85.1 kg (187 lb 9.8 oz)   SpO2 97%   BMI 28.53 kg/m²     Wt Readings from Last 3 Encounters:   09/17/19 85.1 kg (187 lb 9.8 oz)   09/11/19 87 kg (191 lb 12.8 oz)   09/03/19 86.7 kg (191 lb 2.2 oz)     Temp Readings from Last 3 Encounters:   04/24/19 100 °F (37.8 °C) (Tympanic)   04/08/19 97.7 °F (36.5 °C) (Tympanic)   04/04/19 97.1 °F (36.2 °C) (Oral)     BP Readings from Last 3 Encounters:   09/17/19 122/60   09/11/19 122/68   09/03/19 124/73     Pulse Readings from Last 3 Encounters:   09/17/19 66   09/11/19 83   09/03/19 93          Objective:    Physical Exam   Constitutional: He is oriented to person, place, and time. He " appears well-developed and well-nourished.   HENT:   Head: Normocephalic.   Neck: Normal range of motion. Neck supple. Normal carotid pulses, no hepatojugular reflux and no JVD present. Carotid bruit is not present. No thyromegaly present.   Cardiovascular: Normal rate, regular rhythm, S1 normal and S2 normal. PMI is not displaced. Exam reveals no S3, no S4, no distant heart sounds, no friction rub, no midsystolic click and no opening snap.   No murmur heard.  Pulses:       Radial pulses are 2+ on the right side, and 2+ on the left side.   Pulmonary/Chest: Effort normal and breath sounds normal. He has no wheezes. He has no rales.   Abdominal: Soft. Bowel sounds are normal. He exhibits no distension, no abdominal bruit, no ascites and no mass. There is no tenderness.   Musculoskeletal: He exhibits no edema.   Neurological: He is alert and oriented to person, place, and time.   Skin: Skin is warm.   Psychiatric: He has a normal mood and affect. His behavior is normal.   Nursing note and vitals reviewed.      I have reviewed all pertinent labs and cardiac studies.    Lab Results   Component Value Date    WBC 5.57 04/04/2019    HGB 11.6 (L) 04/04/2019    HCT 35.4 (L) 04/04/2019    MCV 86 04/04/2019     04/04/2019           Chemistry        Component Value Date/Time     09/03/2019 1120    K 4.4 09/03/2019 1120     09/03/2019 1120    CO2 27 09/03/2019 1120    BUN 35 (H) 09/03/2019 1120    CREATININE 2.3 (H) 09/03/2019 1120     (H) 09/03/2019 1120        Component Value Date/Time    CALCIUM 9.7 09/03/2019 1120    ALKPHOS 69 09/03/2019 1120    AST 22 09/03/2019 1120    ALT 7 (L) 09/03/2019 1120    BILITOT 0.4 09/03/2019 1120    ESTGFRAFRICA 33.0 (A) 09/03/2019 1120    EGFRNONAA 28.5 (A) 09/03/2019 1120        Component      Latest Ref Rng & Units 9/3/2019             BNP      0 - 99 pg/mL 367 (H)       Lab Results   Component Value Date    HGBA1C 5.2 04/02/2019     Lab Results   Component Value  Date    CHOL 91 (L) 11/16/2018    CHOL 105 (L) 05/02/2018    CHOL 105 (L) 05/02/2018     Lab Results   Component Value Date    HDL 21 (L) 11/16/2018    HDL 28 (L) 05/02/2018    HDL 28 (L) 05/02/2018     Lab Results   Component Value Date    LDLCALC 46.2 (L) 11/16/2018    LDLCALC 52.8 (L) 05/02/2018    LDLCALC 52.8 (L) 05/02/2018     Lab Results   Component Value Date    TRIG 119 11/16/2018    TRIG 121 05/02/2018    TRIG 121 05/02/2018     Lab Results   Component Value Date    CHOLHDL 23.1 11/16/2018    CHOLHDL 26.7 05/02/2018    CHOLHDL 26.7 05/02/2018           Assessment:       1. Chronic combined systolic and diastolic congestive heart failure    2. Hyperlipidemia associated with type 2 diabetes mellitus    3. Pulmonary HTN    4. NSVT (nonsustained ventricular tachycardia)    5. Nonrheumatic aortic valve insufficiency    6. LBBB (left bundle branch block)    7. Ischemic cardiomyopathy    8. Hypertension associated with diabetes    9. Type 2 diabetes mellitus with stage 3 chronic kidney disease, with long-term current use of insulin    10. CKD (chronic kidney disease) stage 3, GFR 30-59 ml/min    11. CAD, multiple vessel    12. Biventricular ICD (implantable cardioverter-defibrillator) in place    13. Abnormal stress test    14. Abnormal ECG    15. Anemia due to stage 3 chronic kidney disease         Plan:             Stable CV conditions on current medical tx.  Reviewed all tests and above medical conditions with pt and formulated tx plan.  Metolazone prn for weight gain/CHF sxs.  Continue OMT for CAD/CHF.  Restart Simvastatin.  Recheck lipids next appt.  Continue f/u ICD clinic.  Cardiac diet.  Weight loss.  Keep DM well controlled.  F/u 3 months with labs.

## 2019-09-19 ENCOUNTER — TELEPHONE (OUTPATIENT)
Dept: OTOLARYNGOLOGY | Facility: CLINIC | Age: 64
End: 2019-09-19

## 2019-09-19 NOTE — TELEPHONE ENCOUNTER
----- Message from Michelle De La Paz MD sent at 9/19/2019 11:56 AM CDT -----  Please let the patient know that his CT temporal bones showed no abnormality.  RTC one year to see PA with audiogram.  Thanks.

## 2019-09-20 ENCOUNTER — TELEPHONE (OUTPATIENT)
Dept: OTOLARYNGOLOGY | Facility: CLINIC | Age: 64
End: 2019-09-20

## 2019-09-20 NOTE — TELEPHONE ENCOUNTER
Left message for call back. Please let the patient know that his CT temporal bones showed no abnormality.  RTC one year to see PA with audiogram.  Thanks.

## 2019-09-20 NOTE — TELEPHONE ENCOUNTER
----- Message from Donita Ramirez MA sent at 9/19/2019  4:16 PM CDT -----  Try calling the patient again.

## 2019-10-08 ENCOUNTER — LAB VISIT (OUTPATIENT)
Dept: LAB | Facility: HOSPITAL | Age: 64
End: 2019-10-08
Attending: FAMILY MEDICINE
Payer: MEDICARE

## 2019-10-08 ENCOUNTER — OFFICE VISIT (OUTPATIENT)
Dept: INTERNAL MEDICINE | Facility: CLINIC | Age: 64
End: 2019-10-08
Payer: MEDICARE

## 2019-10-08 VITALS
OXYGEN SATURATION: 99 % | RESPIRATION RATE: 18 BRPM | DIASTOLIC BLOOD PRESSURE: 68 MMHG | HEIGHT: 68 IN | HEART RATE: 82 BPM | SYSTOLIC BLOOD PRESSURE: 116 MMHG | TEMPERATURE: 97 F | WEIGHT: 189.63 LBS | BODY MASS INDEX: 28.74 KG/M2

## 2019-10-08 DIAGNOSIS — I25.5 ISCHEMIC CARDIOMYOPATHY: ICD-10-CM

## 2019-10-08 DIAGNOSIS — M1A.0790 CHRONIC IDIOPATHIC GOUT INVOLVING TOE WITHOUT TOPHUS, UNSPECIFIED LATERALITY: ICD-10-CM

## 2019-10-08 DIAGNOSIS — I27.20 PULMONARY HTN: ICD-10-CM

## 2019-10-08 DIAGNOSIS — E11.59 HYPERTENSION ASSOCIATED WITH DIABETES: Primary | ICD-10-CM

## 2019-10-08 DIAGNOSIS — Z95.810 BIVENTRICULAR ICD (IMPLANTABLE CARDIOVERTER-DEFIBRILLATOR) IN PLACE: Chronic | ICD-10-CM

## 2019-10-08 DIAGNOSIS — E11.22 TYPE 2 DIABETES MELLITUS WITH STAGE 3 CHRONIC KIDNEY DISEASE, WITH LONG-TERM CURRENT USE OF INSULIN: ICD-10-CM

## 2019-10-08 DIAGNOSIS — Z12.5 ENCOUNTER FOR SPECIAL SCREENING EXAMINATION FOR NEOPLASM OF PROSTATE: ICD-10-CM

## 2019-10-08 DIAGNOSIS — E78.5 HYPERLIPIDEMIA ASSOCIATED WITH TYPE 2 DIABETES MELLITUS: ICD-10-CM

## 2019-10-08 DIAGNOSIS — E11.69 HYPERLIPIDEMIA ASSOCIATED WITH TYPE 2 DIABETES MELLITUS: ICD-10-CM

## 2019-10-08 DIAGNOSIS — N18.30 ANEMIA DUE TO STAGE 3 CHRONIC KIDNEY DISEASE: ICD-10-CM

## 2019-10-08 DIAGNOSIS — G47.34 NOCTURNAL HYPOXEMIA: ICD-10-CM

## 2019-10-08 DIAGNOSIS — I50.42 CHRONIC COMBINED SYSTOLIC AND DIASTOLIC CONGESTIVE HEART FAILURE: ICD-10-CM

## 2019-10-08 DIAGNOSIS — E11.59 HYPERTENSION ASSOCIATED WITH DIABETES: ICD-10-CM

## 2019-10-08 DIAGNOSIS — N18.30 CKD (CHRONIC KIDNEY DISEASE) STAGE 3, GFR 30-59 ML/MIN: ICD-10-CM

## 2019-10-08 DIAGNOSIS — N18.30 TYPE 2 DIABETES MELLITUS WITH STAGE 3 CHRONIC KIDNEY DISEASE, WITH LONG-TERM CURRENT USE OF INSULIN: ICD-10-CM

## 2019-10-08 DIAGNOSIS — E55.9 VITAMIN D DEFICIENCY: ICD-10-CM

## 2019-10-08 DIAGNOSIS — E21.3 HYPERPARATHYROIDISM: ICD-10-CM

## 2019-10-08 DIAGNOSIS — Z79.4 TYPE 2 DIABETES MELLITUS WITH STAGE 3 CHRONIC KIDNEY DISEASE, WITH LONG-TERM CURRENT USE OF INSULIN: ICD-10-CM

## 2019-10-08 DIAGNOSIS — I47.29 NSVT (NONSUSTAINED VENTRICULAR TACHYCARDIA): ICD-10-CM

## 2019-10-08 DIAGNOSIS — I15.2 HYPERTENSION ASSOCIATED WITH DIABETES: Primary | ICD-10-CM

## 2019-10-08 DIAGNOSIS — B35.1 ONYCHOMYCOSIS OF MULTIPLE TOENAILS WITH TYPE 2 DIABETES MELLITUS: ICD-10-CM

## 2019-10-08 DIAGNOSIS — D63.1 ANEMIA DUE TO STAGE 3 CHRONIC KIDNEY DISEASE: ICD-10-CM

## 2019-10-08 DIAGNOSIS — E11.69 ONYCHOMYCOSIS OF MULTIPLE TOENAILS WITH TYPE 2 DIABETES MELLITUS: ICD-10-CM

## 2019-10-08 DIAGNOSIS — K21.9 GASTROESOPHAGEAL REFLUX DISEASE, ESOPHAGITIS PRESENCE NOT SPECIFIED: ICD-10-CM

## 2019-10-08 DIAGNOSIS — I25.10 CAD, MULTIPLE VESSEL: ICD-10-CM

## 2019-10-08 DIAGNOSIS — I15.2 HYPERTENSION ASSOCIATED WITH DIABETES: ICD-10-CM

## 2019-10-08 DIAGNOSIS — I35.1 NONRHEUMATIC AORTIC VALVE INSUFFICIENCY: ICD-10-CM

## 2019-10-08 LAB
25(OH)D3+25(OH)D2 SERPL-MCNC: 29 NG/ML (ref 30–96)
ALBUMIN SERPL BCP-MCNC: 3.4 G/DL (ref 3.5–5.2)
ALP SERPL-CCNC: 72 U/L (ref 55–135)
ALT SERPL W/O P-5'-P-CCNC: 10 U/L (ref 10–44)
ANION GAP SERPL CALC-SCNC: 8 MMOL/L (ref 8–16)
AST SERPL-CCNC: 20 U/L (ref 10–40)
BASOPHILS # BLD AUTO: 0.03 K/UL (ref 0–0.2)
BASOPHILS NFR BLD: 0.5 % (ref 0–1.9)
BILIRUB SERPL-MCNC: 0.4 MG/DL (ref 0.1–1)
BNP SERPL-MCNC: 199 PG/ML (ref 0–99)
BUN SERPL-MCNC: 45 MG/DL (ref 8–23)
CALCIUM SERPL-MCNC: 9.3 MG/DL (ref 8.7–10.5)
CHLORIDE SERPL-SCNC: 101 MMOL/L (ref 95–110)
CHOLEST SERPL-MCNC: 121 MG/DL (ref 120–199)
CHOLEST/HDLC SERPL: 3.6 {RATIO} (ref 2–5)
CO2 SERPL-SCNC: 29 MMOL/L (ref 23–29)
COMPLEXED PSA SERPL-MCNC: 1.5 NG/ML (ref 0–4)
CREAT SERPL-MCNC: 2.4 MG/DL (ref 0.5–1.4)
DIFFERENTIAL METHOD: ABNORMAL
EOSINOPHIL # BLD AUTO: 0.3 K/UL (ref 0–0.5)
EOSINOPHIL NFR BLD: 5.3 % (ref 0–8)
ERYTHROCYTE [DISTWIDTH] IN BLOOD BY AUTOMATED COUNT: 14.2 % (ref 11.5–14.5)
EST. GFR  (AFRICAN AMERICAN): 31.3 ML/MIN/1.73 M^2
EST. GFR  (NON AFRICAN AMERICAN): 27.1 ML/MIN/1.73 M^2
ESTIMATED AVG GLUCOSE: 126 MG/DL (ref 68–131)
GLUCOSE SERPL-MCNC: 164 MG/DL (ref 70–110)
HBA1C MFR BLD HPLC: 6 % (ref 4–5.6)
HCT VFR BLD AUTO: 39 % (ref 40–54)
HDLC SERPL-MCNC: 34 MG/DL (ref 40–75)
HDLC SERPL: 28.1 % (ref 20–50)
HGB BLD-MCNC: 12.3 G/DL (ref 14–18)
IMM GRANULOCYTES # BLD AUTO: 0.02 K/UL (ref 0–0.04)
IMM GRANULOCYTES NFR BLD AUTO: 0.3 % (ref 0–0.5)
LDLC SERPL CALC-MCNC: 55.4 MG/DL (ref 63–159)
LYMPHOCYTES # BLD AUTO: 2 K/UL (ref 1–4.8)
LYMPHOCYTES NFR BLD: 31.3 % (ref 18–48)
MCH RBC QN AUTO: 28.9 PG (ref 27–31)
MCHC RBC AUTO-ENTMCNC: 31.5 G/DL (ref 32–36)
MCV RBC AUTO: 92 FL (ref 82–98)
MONOCYTES # BLD AUTO: 0.7 K/UL (ref 0.3–1)
MONOCYTES NFR BLD: 11.8 % (ref 4–15)
NEUTROPHILS # BLD AUTO: 3.2 K/UL (ref 1.8–7.7)
NEUTROPHILS NFR BLD: 50.8 % (ref 38–73)
NONHDLC SERPL-MCNC: 87 MG/DL
NRBC BLD-RTO: 0 /100 WBC
PLATELET # BLD AUTO: 120 K/UL (ref 150–350)
PMV BLD AUTO: ABNORMAL FL (ref 9.2–12.9)
POTASSIUM SERPL-SCNC: 4.4 MMOL/L (ref 3.5–5.1)
PROT SERPL-MCNC: 7.5 G/DL (ref 6–8.4)
PTH-INTACT SERPL-MCNC: 88 PG/ML (ref 9–77)
RBC # BLD AUTO: 4.25 M/UL (ref 4.6–6.2)
SODIUM SERPL-SCNC: 138 MMOL/L (ref 136–145)
TRIGL SERPL-MCNC: 158 MG/DL (ref 30–150)
TSH SERPL DL<=0.005 MIU/L-ACNC: 2.91 UIU/ML (ref 0.4–4)
URATE SERPL-MCNC: 6.6 MG/DL (ref 3.4–7)
WBC # BLD AUTO: 6.26 K/UL (ref 3.9–12.7)

## 2019-10-08 PROCEDURE — 36415 COLL VENOUS BLD VENIPUNCTURE: CPT

## 2019-10-08 PROCEDURE — 83970 ASSAY OF PARATHORMONE: CPT

## 2019-10-08 PROCEDURE — 84443 ASSAY THYROID STIM HORMONE: CPT

## 2019-10-08 PROCEDURE — 90662 IIV NO PRSV INCREASED AG IM: CPT | Mod: PBBFAC

## 2019-10-08 PROCEDURE — 99999 PR PBB SHADOW E&M-EST. PATIENT-LVL III: ICD-10-PCS | Mod: PBBFAC,,, | Performed by: FAMILY MEDICINE

## 2019-10-08 PROCEDURE — 99999 PR PBB SHADOW E&M-EST. PATIENT-LVL III: CPT | Mod: PBBFAC,,, | Performed by: FAMILY MEDICINE

## 2019-10-08 PROCEDURE — 84550 ASSAY OF BLOOD/URIC ACID: CPT

## 2019-10-08 PROCEDURE — 82306 VITAMIN D 25 HYDROXY: CPT

## 2019-10-08 PROCEDURE — 83880 ASSAY OF NATRIURETIC PEPTIDE: CPT

## 2019-10-08 PROCEDURE — 84153 ASSAY OF PSA TOTAL: CPT

## 2019-10-08 PROCEDURE — 83036 HEMOGLOBIN GLYCOSYLATED A1C: CPT

## 2019-10-08 PROCEDURE — 80053 COMPREHEN METABOLIC PANEL: CPT

## 2019-10-08 PROCEDURE — 99213 OFFICE O/P EST LOW 20 MIN: CPT | Mod: PBBFAC,25 | Performed by: FAMILY MEDICINE

## 2019-10-08 PROCEDURE — 99214 PR OFFICE/OUTPT VISIT, EST, LEVL IV, 30-39 MIN: ICD-10-PCS | Mod: 25,S$PBB,, | Performed by: FAMILY MEDICINE

## 2019-10-08 PROCEDURE — 99214 OFFICE O/P EST MOD 30 MIN: CPT | Mod: 25,S$PBB,, | Performed by: FAMILY MEDICINE

## 2019-10-08 PROCEDURE — 85025 COMPLETE CBC W/AUTO DIFF WBC: CPT

## 2019-10-08 PROCEDURE — 80061 LIPID PANEL: CPT

## 2019-10-08 NOTE — PROGRESS NOTES
Subjective:       Patient ID: Yan Choudhury is a 64 y.o. male.    Chief Complaint: Follow-up    64-year-old  male patient with Patient Active Problem List:     Hypertension associated with diabetes     ED (erectile dysfunction)     Ischemic cardiomyopathy     Pulmonary HTN     Hyperlipidemia associated with type 2 diabetes mellitus     Loss of eye - Right Eye     Abnormal ECG     LBBB (left bundle branch block)     Abnormal stress test     Chronic combined systolic and diastolic congestive heart failure     Cysts of eyelids     Primary open-angle glaucoma, moderate stage     Biventricular ICD (implantable cardioverter-defibrillator) in place     Anemia due to stage 3 chronic kidney disease     Type 2 diabetes mellitus with kidney complication, with long-term current use of insulin     Gastroesophageal reflux disease     Chronic gout without tophus     Vitamin D deficiency     Onychomycosis of multiple toenails with type 2 diabetes mellitus     Prosthetic eye globe     Screening for colon cancer     History of colostomy reversal     Nocturnal hypoxemia     Alteration in skin integrity     CKD (chronic kidney disease) stage 3, GFR 30-59 ml/min     Nonrheumatic aortic valve insufficiency     NSVT (nonsustained ventricular tachycardia)     Hyperparathyroidism     CAD, multiple vessel  Here for follow-up on chronic medical conditions.  Patient reports that he has been taking his medications regularly and continues to have shortness of breath with minimal exertion, has been followed by Cardiology.   Patient has been using 2 L of oxygen at bedtime  Reports that he has been monitoring his blood glucose levels which has been running in the range of 130s to 140s.   Denies any chest pain or difficulty breathing, palpitations nausea vomiting  Denies any tingling or numbness sensation to extremities, no gout exacerbations reported    Review of Systems   Constitutional: Negative for appetite change and fatigue.  "  Eyes: Negative for visual disturbance.   Respiratory: Positive for shortness of breath.         With minimal exertion   Cardiovascular: Negative for chest pain, palpitations and leg swelling.   Gastrointestinal: Negative for abdominal pain, nausea and vomiting.   Endocrine: Negative for polydipsia, polyphagia and polyuria.   Musculoskeletal: Negative for myalgias.   Skin: Negative for rash.   Neurological: Negative for weakness, numbness and headaches.   Psychiatric/Behavioral: Negative for sleep disturbance.         /68 (BP Location: Right arm, Patient Position: Sitting, BP Method: Medium (Manual))   Pulse 82   Temp 96.9 °F (36.1 °C) (Tympanic)   Resp 18   Ht 5' 8" (1.727 m)   Wt 86 kg (189 lb 9.5 oz)   SpO2 99%   BMI 28.83 kg/m²   Objective:      Physical Exam   Constitutional: He is oriented to person, place, and time. He appears well-developed and well-nourished.   HENT:   Head: Normocephalic and atraumatic.   Mouth/Throat: Oropharynx is clear and moist.   Cardiovascular: Normal rate, regular rhythm and normal heart sounds.   No murmur heard.  Pulses:       Dorsalis pedis pulses are 1+ on the right side, and 1+ on the left side.   Pulmonary/Chest: Effort normal and breath sounds normal. No respiratory distress. He has no wheezes. He exhibits no tenderness.   Noted minimal shortness of breath   Abdominal: Soft. Bowel sounds are normal. There is no tenderness.   Musculoskeletal: He exhibits no edema.   Feet:   Right Foot:   Protective Sensation: 10 sites tested. 10 sites sensed.   Skin Integrity: Positive for callus and dry skin.   Left Foot:   Protective Sensation: 10 sites tested. 10 sites sensed.   Skin Integrity: Positive for callus and dry skin.   Neurological: He is alert and oriented to person, place, and time.   Skin: Skin is warm and dry. No rash noted.   Psychiatric: He has a normal mood and affect.         Assessment/Plan:   1. Hypertension associated with diabetes  - Comprehensive " metabolic panel; Future  - Lipid panel; Future  - TSH; Future  Vital signs stable today.  Blood pressure is stable today currently taking Entresto     2. Hyperlipidemia associated with type 2 diabetes mellitus  - Lipid panel; Future  Stable on simvastatin 10 mg daily    3. Type 2 diabetes mellitus with stage 3 chronic kidney disease, with long-term current use of insulin  - CBC auto differential; Future  - Comprehensive metabolic panel; Future  - Lipid panel; Future  - Hemoglobin A1c; Future  Currently on Lantus 50 units twice a day  Diabetic foot exam stable today  Encouraged to drink adequate fluids  Strict lifestyle changes recommended with 1800 ADA low-fat and low-cholesterol diet and exercise 30 min daily    4. CKD (chronic kidney disease) stage 3, GFR 30-59 ml/min  - CBC auto differential; Future  - Comprehensive metabolic panel; Future    5. Onychomycosis of multiple toenails with type 2 diabetes mellitus    6. Anemia due to stage 3 chronic kidney disease  - CBC auto differential; Future  As per Nephrology    7. Chronic combined systolic and diastolic congestive heart failure  - Brain natriuretic peptide; Future  8. Biventricular ICD (implantable cardioverter-defibrillator) in place  9. Ischemic cardiomyopathy  10. Pulmonary HTN  14. Nonrheumatic aortic valve insufficiency  15. NSVT (nonsustained ventricular tachycardia)  17. CAD, multiple vessel  18. Nocturnal hypoxemia-on 2 L of oxygen at bedtime    Followed by Cardiology, currently taking Entresto, Zaroxolyn and Bumex  Secondary to chronic shortness of breath will check further labs and advised to discuss further with Cardiology    11. Gastroesophageal reflux disease, esophagitis presence not specified  Stable on pantoprazole 40 mg daily    12. Chronic idiopathic gout involving toe without tophus, unspecified laterality  - Uric acid; Future  13. Vitamin D deficiency  - Vitamin D; Future  16. Hyperparathyroidism  - PTH, intact; Future  As per Nephrology  currently on allopurinol 100 mg daily    19. Encounter for special screening examination for neoplasm of prostate  - PSA, Screening; Future     Flu shot given today  Patient was advised to consider getting shingles vaccination if interested at outside pharmacy

## 2019-10-10 ENCOUNTER — TELEPHONE (OUTPATIENT)
Dept: SURGERY | Facility: CLINIC | Age: 64
End: 2019-10-10

## 2019-10-10 RX ORDER — DIPHENOXYLATE HYDROCHLORIDE AND ATROPINE SULFATE 2.5; .025 MG/1; MG/1
1 TABLET ORAL 4 TIMES DAILY PRN
Qty: 120 TABLET | Refills: 3 | Status: SHIPPED | OUTPATIENT
Start: 2019-10-10 | End: 2019-10-20

## 2019-10-10 NOTE — TELEPHONE ENCOUNTER
Spoke to patient he stated that he is having diarrhea, the patient was advised per Dr. Lindsay that a Rx will be sent to his pharmacy electronically, patient voiced understanding.

## 2019-10-10 NOTE — TELEPHONE ENCOUNTER
----- Message from Rubio Roach sent at 10/10/2019  1:52 PM CDT -----  Contact: pt  Type:  Needs Medical Advice    Who Called: pt   Symptoms (please be specific): diarrhea   How long has patient had these symptoms:  na  Pharmacy name and phone #:  [facility]  Would the patient rather a call back or a response via [facility]? Callback   Best Call Back Number: [phone] or [phone]  Additional Information:     [id number] [place] - [address] AT [facility]  [address] [place] [address]  Phone: [phone] Fax: [phone]

## 2019-10-10 NOTE — TELEPHONE ENCOUNTER
Patient states that he has diarrhea and would like something other that Immodium. Advised that a message will be sent to Dr Lindsay

## 2019-11-27 ENCOUNTER — LAB VISIT (OUTPATIENT)
Dept: LAB | Facility: HOSPITAL | Age: 64
End: 2019-11-27
Attending: INTERNAL MEDICINE
Payer: MEDICARE

## 2019-11-27 DIAGNOSIS — R80.9 PROTEINURIA, UNSPECIFIED TYPE: ICD-10-CM

## 2019-11-27 DIAGNOSIS — N18.4 CKD (CHRONIC KIDNEY DISEASE) STAGE 4, GFR 15-29 ML/MIN: ICD-10-CM

## 2019-11-27 LAB
BACTERIA #/AREA URNS HPF: NORMAL /HPF
BILIRUB UR QL STRIP: NEGATIVE
CLARITY UR: CLEAR
COLOR UR: YELLOW
CREAT UR-MCNC: 95 MG/DL (ref 23–375)
GLUCOSE UR QL STRIP: NEGATIVE
HGB UR QL STRIP: ABNORMAL
HYALINE CASTS #/AREA URNS LPF: 1 /LPF
KETONES UR QL STRIP: NEGATIVE
LEUKOCYTE ESTERASE UR QL STRIP: NEGATIVE
MICROSCOPIC COMMENT: NORMAL
NITRITE UR QL STRIP: NEGATIVE
PH UR STRIP: 6 [PH] (ref 5–8)
PROT UR QL STRIP: ABNORMAL
PROT UR-MCNC: 105 MG/DL (ref 0–15)
PROT/CREAT UR: 1.11 MG/G{CREAT} (ref 0–0.2)
RBC #/AREA URNS HPF: 2 /HPF (ref 0–4)
SP GR UR STRIP: <=1.005 (ref 1–1.03)
URN SPEC COLLECT METH UR: ABNORMAL
WBC #/AREA URNS HPF: 1 /HPF (ref 0–5)

## 2019-11-27 PROCEDURE — 82570 ASSAY OF URINE CREATININE: CPT

## 2019-11-27 PROCEDURE — 81000 URINALYSIS NONAUTO W/SCOPE: CPT

## 2019-12-02 ENCOUNTER — OFFICE VISIT (OUTPATIENT)
Dept: CARDIOLOGY | Facility: CLINIC | Age: 64
End: 2019-12-02
Payer: MEDICARE

## 2019-12-02 VITALS
DIASTOLIC BLOOD PRESSURE: 68 MMHG | HEART RATE: 95 BPM | WEIGHT: 201.75 LBS | BODY MASS INDEX: 30.67 KG/M2 | SYSTOLIC BLOOD PRESSURE: 110 MMHG

## 2019-12-02 DIAGNOSIS — I35.1 NONRHEUMATIC AORTIC VALVE INSUFFICIENCY: ICD-10-CM

## 2019-12-02 DIAGNOSIS — N18.30 CKD (CHRONIC KIDNEY DISEASE) STAGE 3, GFR 30-59 ML/MIN: ICD-10-CM

## 2019-12-02 DIAGNOSIS — I25.5 ISCHEMIC CARDIOMYOPATHY: ICD-10-CM

## 2019-12-02 DIAGNOSIS — I15.2 HYPERTENSION ASSOCIATED WITH DIABETES: ICD-10-CM

## 2019-12-02 DIAGNOSIS — R94.39 ABNORMAL STRESS TEST: ICD-10-CM

## 2019-12-02 DIAGNOSIS — E11.22 TYPE 2 DIABETES MELLITUS WITH STAGE 3 CHRONIC KIDNEY DISEASE, WITH LONG-TERM CURRENT USE OF INSULIN: ICD-10-CM

## 2019-12-02 DIAGNOSIS — I44.7 LBBB (LEFT BUNDLE BRANCH BLOCK): ICD-10-CM

## 2019-12-02 DIAGNOSIS — R94.31 ABNORMAL ECG: ICD-10-CM

## 2019-12-02 DIAGNOSIS — I25.10 CAD, MULTIPLE VESSEL: ICD-10-CM

## 2019-12-02 DIAGNOSIS — I47.29 NSVT (NONSUSTAINED VENTRICULAR TACHYCARDIA): ICD-10-CM

## 2019-12-02 DIAGNOSIS — E11.59 HYPERTENSION ASSOCIATED WITH DIABETES: ICD-10-CM

## 2019-12-02 DIAGNOSIS — E78.5 HYPERLIPIDEMIA ASSOCIATED WITH TYPE 2 DIABETES MELLITUS: ICD-10-CM

## 2019-12-02 DIAGNOSIS — Z95.810 BIVENTRICULAR ICD (IMPLANTABLE CARDIOVERTER-DEFIBRILLATOR) IN PLACE: Chronic | ICD-10-CM

## 2019-12-02 DIAGNOSIS — E11.69 HYPERLIPIDEMIA ASSOCIATED WITH TYPE 2 DIABETES MELLITUS: ICD-10-CM

## 2019-12-02 DIAGNOSIS — Z79.4 TYPE 2 DIABETES MELLITUS WITH STAGE 3 CHRONIC KIDNEY DISEASE, WITH LONG-TERM CURRENT USE OF INSULIN: ICD-10-CM

## 2019-12-02 DIAGNOSIS — I50.42 CHRONIC COMBINED SYSTOLIC AND DIASTOLIC CONGESTIVE HEART FAILURE: Primary | ICD-10-CM

## 2019-12-02 DIAGNOSIS — N18.30 TYPE 2 DIABETES MELLITUS WITH STAGE 3 CHRONIC KIDNEY DISEASE, WITH LONG-TERM CURRENT USE OF INSULIN: ICD-10-CM

## 2019-12-02 DIAGNOSIS — I27.20 PULMONARY HTN: ICD-10-CM

## 2019-12-02 PROCEDURE — 1159F PR MEDICATION LIST DOCUMENTED IN MEDICAL RECORD: ICD-10-PCS | Mod: ,,, | Performed by: INTERNAL MEDICINE

## 2019-12-02 PROCEDURE — 99214 PR OFFICE/OUTPT VISIT, EST, LEVL IV, 30-39 MIN: ICD-10-PCS | Mod: S$PBB,,, | Performed by: INTERNAL MEDICINE

## 2019-12-02 PROCEDURE — 1159F MED LIST DOCD IN RCRD: CPT | Mod: ,,, | Performed by: INTERNAL MEDICINE

## 2019-12-02 PROCEDURE — 99999 PR PBB SHADOW E&M-EST. PATIENT-LVL IV: CPT | Mod: PBBFAC,,, | Performed by: INTERNAL MEDICINE

## 2019-12-02 PROCEDURE — 99999 PR PBB SHADOW E&M-EST. PATIENT-LVL IV: ICD-10-PCS | Mod: PBBFAC,,, | Performed by: INTERNAL MEDICINE

## 2019-12-02 PROCEDURE — 99214 OFFICE O/P EST MOD 30 MIN: CPT | Mod: PBBFAC | Performed by: INTERNAL MEDICINE

## 2019-12-02 PROCEDURE — 99214 OFFICE O/P EST MOD 30 MIN: CPT | Mod: S$PBB,,, | Performed by: INTERNAL MEDICINE

## 2019-12-02 NOTE — PROGRESS NOTES
Subjective:    Patient ID:  Yan Choudhury is a 64 y.o. male who presents for evaluation of Hyperlipidemia; Hypertension; Coronary Artery Disease; Congestive Heart Failure; and Risk Factor Management For Atherosclerosis      HPI Pt presents for f/u.   His current medical conditions include CHF, CAD/ICM, DCM, LBBB, HTN, CRI, DM, CRT-ICD, PTHN, dyslipidemia, obesity. Nonsmoker.   Past hx pertinent for following:  LHC was done 2009 showed diffuse CAD, and severe distal CAD involving apical LAD, distal RCA/distal LCX.   Echo Oct 2010 showed LVEF 25%.   PET stress 2016 showed inferior scar, no significant ischemia noted and LVEF < 35%.   s/p BIV ICD 3/16 for CHF/LBBB.  S/p admission 4/19 for acute on chronic CHF exacerbation.  Echo April 19 EF 15 - 20%, LVE, mod AI, mild-mod TR, mild MR, severe PHTN 77 mmHg.  ecg 4/2/19 V pacing.  ICD interrogation 3/19 normal device function, NSVT.  Stress MPI 6/19 multivessel scar, trivial ischemia, EF 28%.  Now here.  Somewhat more short of breath than usual.  No pnd/orthopnea.  No leg edema.  No active anginal sxs.  Creatinine 2.4  BNP elevated but stable.  Weight up 10 pounds since last visit.  Compliant with meds.  Lipids well controlled, on statin.  ICD well functioning and has not fired.      Current Outpatient Medications:     allopurinol (ZYLOPRIM) 100 MG tablet, TAKE 1 TABLET (100 MG TOTAL) BY MOUTH ONCE DAILY., Disp: 90 tablet, Rfl: 0    aspirin (ECOTRIN) 81 MG EC tablet, Take 81 mg by mouth every morning. , Disp: , Rfl:     bumetanide (BUMEX) 2 MG tablet, TAKE 1 TABLET (2 MG TOTAL) BY MOUTH ONCE DAILY., Disp: 90 tablet, Rfl: 3    cholecalciferol, vitamin D3, (VITAMIN D3) 1,000 unit capsule, Take 1,000 Units by mouth once daily., Disp: , Rfl:     IRON/VITAMIN B COMPLEX (GERITOL ORAL), Take by mouth., Disp: , Rfl:     metOLazone (ZAROXOLYN) 5 MG tablet, Take 1 tablet (5 mg total) by mouth daily as needed., Disp: 30 tablet, Rfl: 0    pantoprazole (PROTONIX) 40 MG tablet,  "TAKE 1 TABLET BY MOUTH EVERY DAY FOR ACID REFLUX, Disp: 90 tablet, Rfl: 1    pen needle, diabetic (BD ULTRA-FINE JOSLYN PEN NEEDLE) 32 gauge x 5/32" Ndle, 1 each by Misc.(Non-Drug; Combo Route) route 4 (four) times daily., Disp: 100 each, Rfl: 11    sacubitril-valsartan (ENTRESTO) 24-26 mg per tablet, Take 1 tablet by mouth 2 (two) times daily., Disp: 60 tablet, Rfl: 12    simvastatin (ZOCOR) 10 MG tablet, Take 1 tablet (10 mg total) by mouth every evening., Disp: 90 tablet, Rfl: 4    BD INSULIN SYRINGE ULTRA-FINE 0.5 mL 31 gauge x 5/16 Syrg, USE AS DIRECTED TO INJECT INSULIN TWO TIMES DAILY, Disp: 60 each, Rfl: 11    bimatoprost (LUMIGAN) 0.03 % ophthalmic drops, Place 1 drop into the left eye every evening., Disp: 2.5 mL, Rfl: 11    blood sugar diagnostic Strp, 1 strip by Misc.(Non-Drug; Combo Route) route 4 (four) times daily. Telcare, Disp: 120 strip, Rfl: 11    brimonidine-timolol (COMBIGAN) 0.2-0.5 % Drop, Place 1 drop into both eyes every 12 (twelve) hours., Disp: 10 mL, Rfl: 6    dorzolamide-timolol 2-0.5% (COSOPT) 22.3-6.8 mg/mL ophthalmic solution, Place 1 drop into both eyes 2 (two) times daily., Disp: 10 mL, Rfl: 2    insulin glargine (LANTUS) 100 unit/mL injection, 50 units bid prn when BS< 150, Disp: 30 mL, Rfl: 11    ketoconazole (NIZORAL) 2 % cream, AAA bid prn rash of groin. For Flares., Disp: 60 g, Rfl: 3    lancets [facility], For tel care, Disp: 120 each, Rfl: 11    latanoprost (XALATAN) 0.005 % ophthalmic solution, Place 1 drop into both eyes once daily., Disp: 2.5 mL, Rfl: 4      Review of Systems   Constitution: Positive for weight gain.   HENT: Negative.    Eyes: Negative.    Cardiovascular: Positive for dyspnea on exertion.   Respiratory: Positive for shortness of breath.    Endocrine: Negative.    Hematologic/Lymphatic: Negative.    Skin: Negative.    Musculoskeletal: Negative.    Gastrointestinal: Negative.    Genitourinary: Negative.    Neurological: Negative.  "   Psychiatric/Behavioral: Negative.    Allergic/Immunologic: Negative.      /68 (BP Location: Right arm, Patient Position: Sitting, BP Method: Medium (Manual))   Pulse 95   Wt 91.5 kg (201 lb 11.5 oz)   BMI 30.67 kg/m²          Objective:    Physical Exam   Constitutional: He is oriented to person, place, and time. He appears well-developed and well-nourished.   HENT:   Head: Normocephalic.   Neck: Normal range of motion. Neck supple. Normal carotid pulses, no hepatojugular reflux and no JVD present. Carotid bruit is not present. No thyromegaly present.   Cardiovascular: Normal rate, regular rhythm, S1 normal and S2 normal. PMI is not displaced. Exam reveals no S3, no S4, no distant heart sounds, no friction rub, no midsystolic click and no opening snap.   No murmur heard.  Pulses:       Radial pulses are 2+ on the right side, and 2+ on the left side.   Pulmonary/Chest: Effort normal and breath sounds normal. He has no wheezes. He has no rales.   Abdominal: Soft. Bowel sounds are normal. He exhibits no distension, no abdominal bruit, no ascites and no mass. There is no tenderness.   obese   Musculoskeletal: He exhibits no edema.   Neurological: He is alert and oriented to person, place, and time.   Skin: Skin is warm.   Psychiatric: He has a normal mood and affect. His behavior is normal.   Nursing note and vitals reviewed.      I have reviewed all pertinent labs and cardiac studies.    Component      Latest Ref Rng & Units 11/27/2019             BNP      0 - 99 pg/mL 367 (H)         Chemistry        Component Value Date/Time     11/27/2019 0828     11/27/2019 0828    K 4.1 11/27/2019 0828    K 4.1 11/27/2019 0828     11/27/2019 0828     11/27/2019 0828    CO2 28 11/27/2019 0828    CO2 28 11/27/2019 0828    BUN 25 (H) 11/27/2019 0828    BUN 25 (H) 11/27/2019 0828    CREATININE 2.4 (H) 11/27/2019 0828    CREATININE 2.4 (H) 11/27/2019 0828     (H) 11/27/2019 0828     (H)  11/27/2019 0828        Component Value Date/Time    CALCIUM 9.3 11/27/2019 0828    CALCIUM 9.3 11/27/2019 0828    ALKPHOS 69 11/27/2019 0828    AST 20 11/27/2019 0828    ALT 9 (L) 11/27/2019 0828    BILITOT 0.6 11/27/2019 0828    ESTGFRAFRICA 31.3 (A) 11/27/2019 0828    ESTGFRAFRICA 31.3 (A) 11/27/2019 0828    EGFRNONAA 27.1 (A) 11/27/2019 0828    EGFRNONAA 27.1 (A) 11/27/2019 0828        Lab Results   Component Value Date    WBC 5.76 11/27/2019    HGB 12.7 (L) 11/27/2019    HCT 41.7 11/27/2019    MCV 92 11/27/2019     (L) 11/27/2019       Lab Results   Component Value Date    HGBA1C 6.0 (H) 10/08/2019     Lab Results   Component Value Date    CHOL 139 11/27/2019    CHOL 121 10/08/2019    CHOL 91 (L) 11/16/2018     Lab Results   Component Value Date    HDL 44 11/27/2019    HDL 34 (L) 10/08/2019    HDL 21 (L) 11/16/2018     Lab Results   Component Value Date    LDLCALC 73.2 11/27/2019    LDLCALC 55.4 (L) 10/08/2019    LDLCALC 46.2 (L) 11/16/2018     Lab Results   Component Value Date    TRIG 109 11/27/2019    TRIG 158 (H) 10/08/2019    TRIG 119 11/16/2018     Lab Results   Component Value Date    CHOLHDL 31.7 11/27/2019    CHOLHDL 28.1 10/08/2019    CHOLHDL 23.1 11/16/2018           Assessment:       1. Chronic combined systolic and diastolic congestive heart failure    2. Abnormal ECG    3. Abnormal stress test    4. Biventricular ICD (implantable cardioverter-defibrillator) in place    5. CAD, multiple vessel    6. CKD (chronic kidney disease) stage 3, GFR 30-59 ml/min    7. Hyperlipidemia associated with type 2 diabetes mellitus    8. Hypertension associated with diabetes    9. Ischemic cardiomyopathy    10. LBBB (left bundle branch block)    11. Nonrheumatic aortic valve insufficiency    12. NSVT (nonsustained ventricular tachycardia)    13. Pulmonary HTN    14. Type 2 diabetes mellitus with stage 3 chronic kidney disease, with long-term current use of insulin         Plan:             Stable  cardiovascular conditions overall at present time on current medical treatment, however, has had some weight gain recently and feels somewhat more short of breath on his chronic baseline.  Has not taken Metalozone in a while.  Take one pill M, W, Friday this week.  Stay on current dose of Bumex.  Reviewed all tests and above medical conditions with patient in detail and formulated treatment plan.  Continue optimal medical treatment for cardiovascular conditions.  Cardiac low salt diet discussed.  Daily exercise encouraged, goal 30 +  minutes aerobic exercise as tolerated.  Maintaining healthy weight and weight loss goals (if needed) were discussed in clinic.  Keep DM HGAIC well controlled.  F/u with JORGE Frye, in 3 weeks with CMP/BNP same day.  F/u in ICD clinic next scheduled.

## 2019-12-10 ENCOUNTER — OFFICE VISIT (OUTPATIENT)
Dept: NEPHROLOGY | Facility: CLINIC | Age: 64
End: 2019-12-10
Payer: MEDICARE

## 2019-12-10 VITALS
WEIGHT: 199.5 LBS | BODY MASS INDEX: 30.23 KG/M2 | HEART RATE: 76 BPM | DIASTOLIC BLOOD PRESSURE: 78 MMHG | HEIGHT: 68 IN | SYSTOLIC BLOOD PRESSURE: 128 MMHG | RESPIRATION RATE: 22 BRPM

## 2019-12-10 DIAGNOSIS — R80.9 PROTEINURIA DUE TO TYPE 2 DIABETES MELLITUS: ICD-10-CM

## 2019-12-10 DIAGNOSIS — E11.29 PROTEINURIA DUE TO TYPE 2 DIABETES MELLITUS: ICD-10-CM

## 2019-12-10 DIAGNOSIS — N18.4 CKD (CHRONIC KIDNEY DISEASE) STAGE 4, GFR 15-29 ML/MIN: Primary | ICD-10-CM

## 2019-12-10 PROCEDURE — 99214 OFFICE O/P EST MOD 30 MIN: CPT | Mod: S$PBB,,, | Performed by: INTERNAL MEDICINE

## 2019-12-10 PROCEDURE — 99214 PR OFFICE/OUTPT VISIT, EST, LEVL IV, 30-39 MIN: ICD-10-PCS | Mod: S$PBB,,, | Performed by: INTERNAL MEDICINE

## 2019-12-10 PROCEDURE — 1126F AMNT PAIN NOTED NONE PRSNT: CPT | Mod: ,,, | Performed by: INTERNAL MEDICINE

## 2019-12-10 PROCEDURE — 1159F PR MEDICATION LIST DOCUMENTED IN MEDICAL RECORD: ICD-10-PCS | Mod: ,,, | Performed by: INTERNAL MEDICINE

## 2019-12-10 PROCEDURE — 99999 PR PBB SHADOW E&M-EST. PATIENT-LVL III: ICD-10-PCS | Mod: PBBFAC,,, | Performed by: INTERNAL MEDICINE

## 2019-12-10 PROCEDURE — 99213 OFFICE O/P EST LOW 20 MIN: CPT | Mod: PBBFAC | Performed by: INTERNAL MEDICINE

## 2019-12-10 PROCEDURE — 1159F MED LIST DOCD IN RCRD: CPT | Mod: ,,, | Performed by: INTERNAL MEDICINE

## 2019-12-10 PROCEDURE — 1126F PR PAIN SEVERITY QUANTIFIED, NO PAIN PRESENT: ICD-10-PCS | Mod: ,,, | Performed by: INTERNAL MEDICINE

## 2019-12-10 PROCEDURE — 99999 PR PBB SHADOW E&M-EST. PATIENT-LVL III: CPT | Mod: PBBFAC,,, | Performed by: INTERNAL MEDICINE

## 2019-12-10 RX ORDER — ALLOPURINOL 100 MG/1
TABLET ORAL
Qty: 90 TABLET | Refills: 0 | Status: SHIPPED | OUTPATIENT
Start: 2019-12-10 | End: 2020-03-18 | Stop reason: SDUPTHER

## 2019-12-10 NOTE — PROGRESS NOTES
PROGRESS NOTE FOR ESTABLISHED PATIENT    PHYSICIAN REQUESTING THE CONSULT: Dr. Sandra Estevez    REASON FOR VISIT: Renal insufficiency    64 y.o. male with history of CKD 3/4, HTN, CHF, anemia, DM2, CAD, pulmonary hypertension presents to the renal clinic for evaluation of renal insufficiency.       Patient had recent hospital stay at [facility] in 11/2018 when he presented with leak from colostomy reversal and BROOKE. Peak creatinine was 8.2 on 11/15/18. Renal function has recovered with IV fluids and creatinine has now declined to 2.7 (7/15/19).    July 22, 2019:  Patient today reports mild LE edema today.  No other issues.      December 10, 2019:  Patient feeling OK today. Renal function has slightly improved with creatinine at 2.4 (down from 2.7).         Past Medical History:   Diagnosis Date    Arthritis     CAD (coronary artery disease)     Cataract     Chronic combined systolic and diastolic congestive heart failure 3/24/2015    CKD (chronic kidney disease), stage III     Diabetes mellitus type II      AM    Diabetic retinopathy     anti Veg F injections for macular edema    Diverticulitis of colon     ED (erectile dysfunction)     Encounter for blood transfusion     Glaucoma     HTN (hypertension)     Hyperlipidemia     Ischemic cardiomyopathy     Obesity     JAHAIRA on CPAP     Pacemaker     Pulmonary HTN        Past Surgical History:   Procedure Laterality Date    CARDIAC CATHETERIZATION  2009    CHOLECYSTECTOMY      COLON SURGERY      Sigmoid resection    COLONOSCOPY N/A 10/11/2018    Procedure: COLONOSCOPY;  Surgeon: Quinn Aragon MD;  Location: [facility];  Service: General;  Laterality: N/A;    EYE SURGERY      FLEXIBLE SIGMOIDOSCOPY N/A 1/18/2019    Procedure: SIGMOIDOSCOPY, FLEXIBLE;  Surgeon: Quinn Aragon MD;  Location: [facility];  Service: General;  Laterality: N/A;    ILEOSTOMY N/A 10/30/2018    Procedure: CREATION, ILEOSTOMY;  Surgeon: Kevin Lindsay MD;  Location:  [facility] OR;  Service: General;  Laterality: N/A;    Ileostomy reversal  01/2019    KNEE SURGERY      MOBILIZATION OF SPLENIC FLEXURE N/A 10/30/2018    Procedure: MOBILIZATION, SPLENIC FLEXURE;  Surgeon: Kevin Lindsay MD;  Location: [facility] OR;  Service: General;  Laterality: N/A;    REVISION COLOSTOMY N/A 10/30/2018    Procedure: REVISION OR CLOSURE, COLOSTOMY;  Surgeon: Kevin Lindsay MD;  Location: [facility] OR;  Service: General;  Laterality: N/A;  Parastomal Hernia Repair    RIGHT HEMICOLECTOMY Right 10/30/2018    Procedure: HEMICOLECTOMY, RIGHT;  Surgeon: Kevin Lindsay MD;  Location: [facility] OR;  Service: General;  Laterality: Right;       Review of patient's allergies indicates:  No Known Allergies    Current Outpatient Medications   Medication Sig Dispense Refill    allopurinol (ZYLOPRIM) 100 MG tablet TAKE 1 TABLET (100 MG TOTAL) BY MOUTH ONCE DAILY. 90 tablet 0    aspirin (ECOTRIN) 81 MG EC tablet Take 81 mg by mouth every morning.       BD INSULIN SYRINGE ULTRA-FINE 0.5 mL 31 gauge x 5/16 Syrg USE AS DIRECTED TO INJECT INSULIN TWO TIMES DAILY 60 each 11    bimatoprost (LUMIGAN) 0.03 % ophthalmic drops Place 1 drop into the left eye every evening. 2.5 mL 11    blood sugar diagnostic Strp 1 strip by Misc.(Non-Drug; Combo Route) route 4 (four) times daily. Telcare 120 strip 11    brimonidine-timolol (COMBIGAN) 0.2-0.5 % Drop Place 1 drop into both eyes every 12 (twelve) hours. 10 mL 6    bumetanide (BUMEX) 2 MG tablet TAKE 1 TABLET (2 MG TOTAL) BY MOUTH ONCE DAILY. 90 tablet 3    cholecalciferol, vitamin D3, (VITAMIN D3) 1,000 unit capsule Take 1,000 Units by mouth once daily.      dorzolamide-timolol 2-0.5% (COSOPT) 22.3-6.8 mg/mL ophthalmic solution Place 1 drop into both eyes 2 (two) times daily. 10 mL 2    insulin glargine (LANTUS) 100 unit/mL injection 50 units bid prn when BS< 150 30 mL 11    IRON/VITAMIN B COMPLEX (GERITOL ORAL) Take by mouth.      ketoconazole (NIZORAL) 2 % cream AAA bid  "prn rash of groin. For Flares. 60 g 3    lancets [facility] 120 each 11    latanoprost (XALATAN) 0.005 % ophthalmic solution Place 1 drop into both eyes once daily. 2.5 mL 4    metOLazone (ZAROXOLYN) 5 MG tablet Take 1 tablet (5 mg total) by mouth daily as needed. 30 tablet 0    multivit-min/folic/vit K/lycop (MEN'S 50 PLUS MULTIVITAMIN ORAL) Take by mouth.      pantoprazole (PROTONIX) 40 MG tablet TAKE 1 TABLET BY MOUTH EVERY DAY FOR ACID REFLUX 90 tablet 1    pen needle, diabetic (BD ULTRA-FINE JOSLYN PEN NEEDLE) 32 gauge x 5/32" Ndle 1 each by Misc.(Non-Drug; Combo Route) route 4 (four) times daily. 100 each 11    sacubitril-valsartan (ENTRESTO) 24-26 mg per tablet Take 1 tablet by mouth 2 (two) times daily. 60 tablet 12    simvastatin (ZOCOR) 10 MG tablet Take 1 tablet (10 mg total) by mouth every evening. 90 tablet 4     No current facility-administered medications for this visit.        Family History   Problem Relation Age of Onset    Cancer Mother     Heart disease Father     Stroke Father     No Known Problems Sister     No Known Problems Brother     No Known Problems Maternal Aunt     No Known Problems Maternal Uncle     No Known Problems Paternal Aunt     No Known Problems Paternal Uncle     No Known Problems Maternal Grandmother     No Known Problems Maternal Grandfather     No Known Problems Paternal Grandmother     No Known Problems Paternal Grandfather     Amblyopia Neg Hx     Blindness Neg Hx     Cataracts Neg Hx     Diabetes Neg Hx     Glaucoma Neg Hx     Hypertension Neg Hx     Macular degeneration Neg Hx     Retinal detachment Neg Hx     Strabismus Neg Hx     Thyroid disease Neg Hx        Social History     Socioeconomic History    Marital status:      Spouse name: Not on file    Number of children: 2    Years of education: Not on file    Highest education level: Not on file   Occupational History    Occupation: [employer]     Employer: " [employer]   Social Needs    Financial resource strain: Not on file    Food insecurity:     Worry: Not on file     Inability: Not on file    Transportation needs:     Medical: Not on file     Non-medical: Not on file   Tobacco Use    Smoking status: Never Smoker    Smokeless tobacco: Never Used   Substance and Sexual Activity    Alcohol use: No     Alcohol/week: 0.0 standard drinks     Frequency: Never    Drug use: No    Sexual activity: Not Currently   Lifestyle    Physical activity:     Days per week: Not on file     Minutes per session: Not on file    Stress: Not on file   Relationships    Social connections:     Talks on phone: Not on file     Gets together: Not on file     Attends Mandaen service: Not on file     Active member of club or organization: Not on file     Attends meetings of clubs or organizations: Not on file     Relationship status: Not on file   Other Topics Concern    Not on file   Social History Narrative    . Lives with spouse. Has 2 children. 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       Review of Systems:  1. GENERAL: patient denies any fever, weight changes, generalized weakness, dizziness.  2. HEENT: patient denies headaches, visual disturbances, swallowing problems, sinus pain, nasal congestion.  3. CARDIOVASCULAR: patient denies chest pain, palpitations.  4. PULMONARY: patient reports SOB with exercise, no coughing, hemoptysis, wheezing.  5. GASTROINTESTINAL: patient denies abdominal pain, nausea, vomiting, diarrhea.  6. GENITOURINARY: patient denies dysuria, hematuria, hesitancy, frequency.  7. EXTREMITIES: patient reports LE edema, no LE cramping.  8. DERMATOLOGY: patient denies rashes, ulcers.  9. NEURO: patient denies tremors, extremity weakness, extremity numbness/tingling.  10. MUSCULOSKELETAL: patient denies joint pain, joint swelling.  11. HEMATOLOGY: patient denies rectal or gum bleeding.  12: PSYCH: patient denies anxiety,  "depression.      PHYSICAL EXAM:  /78   Pulse 76   Resp (!) 22   Ht 5' 8" (1.727 m)   Wt 90.5 kg (199 lb 8.3 oz)   BMI 30.34 kg/m²     GENERAL: Pleasant gentleman presents to clinic with non-labored breathing.  HEENT: PER, no nasal discharge, no icterus, no oral exudates, moist mucosal membranes.  NECK: no thyroid mass, no lymphadenopathy.  HEART: RRR S1/S2, no rubs, good peripheral pulses.  LUNGS: CTA bilaterally, no wheezing, breathing is nonlabored.  ABDOMEN: soft, nontender, not distended, bowel sounds are present, no abdominal hernia  EXTREM: Trace LE edema.  SKIN: no rashes, skin is warm and dry.  NEURO: A & O x 3, no obvious focal signs.    LABORATORY RESULTS:    Lab Results   Component Value Date    CREATININE 2.4 (H) 11/27/2019    CREATININE 2.4 (H) 11/27/2019    BUN 25 (H) 11/27/2019    BUN 25 (H) 11/27/2019     11/27/2019     11/27/2019    K 4.1 11/27/2019    K 4.1 11/27/2019     11/27/2019     11/27/2019    CO2 28 11/27/2019    CO2 28 11/27/2019      Lab Results   Component Value Date    PTH 88.0 (H) 10/08/2019    CALCIUM 9.3 11/27/2019    CALCIUM 9.3 11/27/2019    PHOS 3.4 11/27/2019     Lab Results   Component Value Date    ALBUMIN 3.3 (L) 11/27/2019    ALBUMIN 3.3 (L) 11/27/2019     Lab Results   Component Value Date    WBC 5.76 11/27/2019    HGB 12.7 (L) 11/27/2019    HCT 41.7 11/27/2019    MCV 92 11/27/2019     (L) 11/27/2019     Protein Creatinine Ratios:    Creatinine, Random Ur   Date Value Ref Range Status   11/27/2019 95.0 23.0 - 375.0 mg/dL Final     Comment:     The random urine reference ranges provided were established   for 24 hour urine collections.  No reference ranges exist for  random urine specimens.  Correlate clinically.     07/15/2019 123.0 23.0 - 375.0 mg/dL Final     Comment:     The random urine reference ranges provided were established   for 24 hour urine collections.  No reference ranges exist for  random urine specimens.  Correlate " clinically.     03/21/2019 239.0 23.0 - 375.0 mg/dL Final     Comment:     The random urine reference ranges provided were established   for 24 hour urine collections.  No reference ranges exist for  random urine specimens.  Correlate clinically.       Protein, Urine Random   Date Value Ref Range Status   11/27/2019 105 (H) 0 - 15 mg/dL Final     Comment:     The random urine reference ranges provided were established   for 24 hour urine collections.  No reference ranges exist for  random urine specimens.  Correlate clinically.     07/15/2019 8 0 - 15 mg/dL Final     Comment:     The random urine reference ranges provided were established   for 24 hour urine collections.  No reference ranges exist for  random urine specimens.  Correlate clinically.     03/21/2019 281 (H) 0 - 15 mg/dL Final     Comment:     The random urine reference ranges provided were established   for 24 hour urine collections.  No reference ranges exist for  random urine specimens.  Correlate clinically.       Prot/Creat Ratio, Ur   Date Value Ref Range Status   11/27/2019 1.11 (H) 0.00 - 0.20 Final   07/15/2019 0.07 0.00 - 0.20 Final   03/21/2019 1.18 (H) 0.00 - 0.20 Final             ASSESSMENT AND PLAN:  64 y.o. male with history of CKD 3/4, HTN, CHF, anemia, DM2, CAD, pulmonary hypertension, CHF presents to the renal clinic for evaluation of renal insufficiency.     1. Renal insufficiency: Patient had recent episode of BROOKE with peak creatinine of 8.2 on 11/15/18. Renal function has improved with IV fluids and creatinine has declined to 2.4. Patient likely suffers from mild cardiorenal syndrome (EF 20-25%) and creatinine has been fluctuating (in addition to diabetic nephropathy). Will continue Entresto for proteinuria.  Patient's renal function will be monitored closely and he will return to the clinic in 4 months for follow up. Patient is on fluid restrictions (about 40-50 ounces per day).     2. Electrolytes: at goal.     3. Acid base  status:no issues.     4. Volume: Bilateral LE edema. Continue Bumex/metolazone (Cardiology is monitoring this closely).     5. Hypertension: good BP control.     6. Medications: Reviewed. Agree with current medical regimen.    7. DM2: well controlled with last HgA1c at 6 (10/8/19).     8. CAD/CHF: As per Cardiology (Dr. Calvillo).     9. Hyperparathyroidism: vitamin D OTC.

## 2019-12-12 RX ORDER — PANTOPRAZOLE SODIUM 40 MG/1
TABLET, DELAYED RELEASE ORAL
Qty: 90 TABLET | Refills: 1 | Status: SHIPPED | OUTPATIENT
Start: 2019-12-12 | End: 2020-08-17 | Stop reason: SDUPTHER

## 2019-12-17 ENCOUNTER — CLINICAL SUPPORT (OUTPATIENT)
Dept: CARDIOLOGY | Facility: CLINIC | Age: 64
End: 2019-12-17
Attending: INTERNAL MEDICINE
Payer: MEDICARE

## 2019-12-17 DIAGNOSIS — I25.5 ISCHEMIC CARDIOMYOPATHY: ICD-10-CM

## 2019-12-17 DIAGNOSIS — I50.42 CHRONIC COMBINED SYSTOLIC AND DIASTOLIC CONGESTIVE HEART FAILURE: ICD-10-CM

## 2019-12-17 DIAGNOSIS — I47.29 NSVT (NONSUSTAINED VENTRICULAR TACHYCARDIA): ICD-10-CM

## 2019-12-17 DIAGNOSIS — Z95.810 ICD (IMPLANTABLE CARDIOVERTER-DEFIBRILLATOR) IN PLACE: ICD-10-CM

## 2019-12-17 PROCEDURE — 93295 DEV INTERROG REMOTE 1/2/MLT: CPT | Mod: ,,, | Performed by: INTERNAL MEDICINE

## 2019-12-17 PROCEDURE — 93297 REM INTERROG DEV EVAL ICPMS: CPT | Mod: ,,, | Performed by: INTERNAL MEDICINE

## 2019-12-17 PROCEDURE — 93295 ICD REMOTE: ICD-10-PCS | Mod: ,,, | Performed by: INTERNAL MEDICINE

## 2019-12-17 PROCEDURE — 93297 ICD REMOTE: ICD-10-PCS | Mod: ,,, | Performed by: INTERNAL MEDICINE

## 2019-12-17 PROCEDURE — 93296 REM INTERROG EVL PM/IDS: CPT | Mod: PBBFAC | Performed by: INTERNAL MEDICINE

## 2019-12-23 ENCOUNTER — OFFICE VISIT (OUTPATIENT)
Dept: CARDIOLOGY | Facility: CLINIC | Age: 64
End: 2019-12-23
Payer: MEDICARE

## 2019-12-23 ENCOUNTER — LAB VISIT (OUTPATIENT)
Dept: LAB | Facility: HOSPITAL | Age: 64
End: 2019-12-23
Attending: INTERNAL MEDICINE
Payer: MEDICARE

## 2019-12-23 VITALS
BODY MASS INDEX: 30.91 KG/M2 | HEIGHT: 68 IN | OXYGEN SATURATION: 96 % | HEART RATE: 84 BPM | SYSTOLIC BLOOD PRESSURE: 118 MMHG | WEIGHT: 203.94 LBS | DIASTOLIC BLOOD PRESSURE: 70 MMHG

## 2019-12-23 DIAGNOSIS — E11.22 TYPE 2 DIABETES MELLITUS WITH STAGE 3 CHRONIC KIDNEY DISEASE, WITH LONG-TERM CURRENT USE OF INSULIN: ICD-10-CM

## 2019-12-23 DIAGNOSIS — N18.30 CKD (CHRONIC KIDNEY DISEASE) STAGE 3, GFR 30-59 ML/MIN: ICD-10-CM

## 2019-12-23 DIAGNOSIS — Z95.810 BIVENTRICULAR ICD (IMPLANTABLE CARDIOVERTER-DEFIBRILLATOR) IN PLACE: Chronic | ICD-10-CM

## 2019-12-23 DIAGNOSIS — E78.5 HYPERLIPIDEMIA ASSOCIATED WITH TYPE 2 DIABETES MELLITUS: ICD-10-CM

## 2019-12-23 DIAGNOSIS — E11.69 HYPERLIPIDEMIA ASSOCIATED WITH TYPE 2 DIABETES MELLITUS: ICD-10-CM

## 2019-12-23 DIAGNOSIS — I50.42 CHRONIC COMBINED SYSTOLIC AND DIASTOLIC CONGESTIVE HEART FAILURE: Primary | ICD-10-CM

## 2019-12-23 DIAGNOSIS — I25.10 CAD, MULTIPLE VESSEL: ICD-10-CM

## 2019-12-23 DIAGNOSIS — I44.7 LBBB (LEFT BUNDLE BRANCH BLOCK): ICD-10-CM

## 2019-12-23 DIAGNOSIS — I35.1 NONRHEUMATIC AORTIC VALVE INSUFFICIENCY: ICD-10-CM

## 2019-12-23 DIAGNOSIS — D63.1 ANEMIA DUE TO STAGE 3 CHRONIC KIDNEY DISEASE: ICD-10-CM

## 2019-12-23 DIAGNOSIS — Z79.4 TYPE 2 DIABETES MELLITUS WITH STAGE 3 CHRONIC KIDNEY DISEASE, WITH LONG-TERM CURRENT USE OF INSULIN: ICD-10-CM

## 2019-12-23 DIAGNOSIS — I25.5 ISCHEMIC CARDIOMYOPATHY: ICD-10-CM

## 2019-12-23 DIAGNOSIS — N18.30 TYPE 2 DIABETES MELLITUS WITH STAGE 3 CHRONIC KIDNEY DISEASE, WITH LONG-TERM CURRENT USE OF INSULIN: ICD-10-CM

## 2019-12-23 DIAGNOSIS — N18.30 ANEMIA DUE TO STAGE 3 CHRONIC KIDNEY DISEASE: ICD-10-CM

## 2019-12-23 DIAGNOSIS — I47.29 NSVT (NONSUSTAINED VENTRICULAR TACHYCARDIA): ICD-10-CM

## 2019-12-23 DIAGNOSIS — I50.42 CHRONIC COMBINED SYSTOLIC AND DIASTOLIC CONGESTIVE HEART FAILURE: ICD-10-CM

## 2019-12-23 LAB
ALBUMIN SERPL BCP-MCNC: 3.7 G/DL (ref 3.5–5.2)
ALP SERPL-CCNC: 65 U/L (ref 55–135)
ALT SERPL W/O P-5'-P-CCNC: 12 U/L (ref 10–44)
ANION GAP SERPL CALC-SCNC: 11 MMOL/L (ref 8–16)
AST SERPL-CCNC: 30 U/L (ref 10–40)
BILIRUB SERPL-MCNC: 0.7 MG/DL (ref 0.1–1)
BNP SERPL-MCNC: 492 PG/ML (ref 0–99)
BUN SERPL-MCNC: 34 MG/DL (ref 8–23)
CALCIUM SERPL-MCNC: 9.6 MG/DL (ref 8.7–10.5)
CHLORIDE SERPL-SCNC: 97 MMOL/L (ref 95–110)
CO2 SERPL-SCNC: 31 MMOL/L (ref 23–29)
CREAT SERPL-MCNC: 2.6 MG/DL (ref 0.5–1.4)
EST. GFR  (AFRICAN AMERICAN): 28.4 ML/MIN/1.73 M^2
EST. GFR  (NON AFRICAN AMERICAN): 24.6 ML/MIN/1.73 M^2
GLUCOSE SERPL-MCNC: 169 MG/DL (ref 70–110)
POTASSIUM SERPL-SCNC: 4.9 MMOL/L (ref 3.5–5.1)
PROT SERPL-MCNC: 7.7 G/DL (ref 6–8.4)
SODIUM SERPL-SCNC: 139 MMOL/L (ref 136–145)

## 2019-12-23 PROCEDURE — 36415 COLL VENOUS BLD VENIPUNCTURE: CPT

## 2019-12-23 PROCEDURE — 99214 PR OFFICE/OUTPT VISIT, EST, LEVL IV, 30-39 MIN: ICD-10-PCS | Mod: S$PBB,,, | Performed by: PHYSICIAN ASSISTANT

## 2019-12-23 PROCEDURE — 80053 COMPREHEN METABOLIC PANEL: CPT

## 2019-12-23 PROCEDURE — 83880 ASSAY OF NATRIURETIC PEPTIDE: CPT

## 2019-12-23 PROCEDURE — 1126F AMNT PAIN NOTED NONE PRSNT: CPT | Mod: ,,, | Performed by: PHYSICIAN ASSISTANT

## 2019-12-23 PROCEDURE — 1159F MED LIST DOCD IN RCRD: CPT | Mod: ,,, | Performed by: PHYSICIAN ASSISTANT

## 2019-12-23 PROCEDURE — 99999 PR PBB SHADOW E&M-EST. PATIENT-LVL IV: CPT | Mod: PBBFAC,,, | Performed by: PHYSICIAN ASSISTANT

## 2019-12-23 PROCEDURE — 1126F PR PAIN SEVERITY QUANTIFIED, NO PAIN PRESENT: ICD-10-PCS | Mod: ,,, | Performed by: PHYSICIAN ASSISTANT

## 2019-12-23 PROCEDURE — 1159F PR MEDICATION LIST DOCUMENTED IN MEDICAL RECORD: ICD-10-PCS | Mod: ,,, | Performed by: PHYSICIAN ASSISTANT

## 2019-12-23 PROCEDURE — 99999 PR PBB SHADOW E&M-EST. PATIENT-LVL IV: ICD-10-PCS | Mod: PBBFAC,,, | Performed by: PHYSICIAN ASSISTANT

## 2019-12-23 PROCEDURE — 99214 OFFICE O/P EST MOD 30 MIN: CPT | Mod: PBBFAC | Performed by: PHYSICIAN ASSISTANT

## 2019-12-23 PROCEDURE — 99214 OFFICE O/P EST MOD 30 MIN: CPT | Mod: S$PBB,,, | Performed by: PHYSICIAN ASSISTANT

## 2019-12-23 NOTE — PROGRESS NOTES
Subjective:    Patient ID:  Yan Choudhury is a 64 y.o. male who presents for follow-up of CHF.      HPI   Mr. Choudhury is a 64 year old male patient whose current medical conditions include CAD, ICM, LBBB, HTN, CRI, DM, chronic combined CHF s/p CRT-D, dyslipidemia, and obesity who presents today for follow-up. Patient was recently seen by Dr. Calvillo and given three doses of Metolazone due to mild fluid overload. He returns today and states he is doing ok. Does admit to some SOB/LIM, about the same as when he saw Dr. Calvillo. Weight is also up another 2-3 lbs. No other symptoms. No PND, orthopnea, no BLE edema.  No lightheadedness, dizziness, palpitations, near syncope, or syncope. No davion chest pain tightness or heaviness. BP stable and controlled. Patient reports compliance with his medications. Mindful of his salt intake. Does admit to over-indulging with is fluid intake, likes to drink soft drinks. No AICD shocks.  Labs pending.    Stress MPI 6/19 multivessel scar, trivial ischemia, EF 28%. 2D echo 4/19 showed EF of 15-20%, RVE with moderately depressed RV systolic function.        Review of Systems   Constitution: Positive for malaise/fatigue. Negative for chills, decreased appetite and fever.   HENT: Negative for congestion, hoarse voice and sore throat.    Eyes: Negative for blurred vision and discharge.   Cardiovascular: Positive for dyspnea on exertion. Negative for chest pain, claudication, cyanosis, irregular heartbeat, leg swelling, near-syncope, orthopnea, palpitations and paroxysmal nocturnal dyspnea.   Respiratory: Positive for shortness of breath. Negative for cough, hemoptysis, snoring, sputum production and wheezing.    Endocrine: Negative for cold intolerance and heat intolerance.   Hematologic/Lymphatic: Negative for bleeding problem. Does not bruise/bleed easily.   Skin: Negative for rash.   Musculoskeletal: Negative for arthritis, back pain, joint pain, joint swelling, muscle cramps, muscle weakness  "and myalgias.   Gastrointestinal: Negative for abdominal pain, constipation, diarrhea, heartburn, melena and nausea.   Genitourinary: Negative for hematuria.   Neurological: Negative for dizziness, focal weakness, headaches, light-headedness, loss of balance, numbness, paresthesias, seizures and weakness.   Psychiatric/Behavioral: Negative for memory loss. The patient does not have insomnia.    Allergic/Immunologic: Negative for hives.     Ht 5' 8" (1.727 m)   Wt 92.5 kg (203 lb 14.8 oz)   BMI 31.01 kg/m²     Objective:    Physical Exam   Constitutional: He is oriented to person, place, and time. He appears well-developed and well-nourished. No distress.   HENT:   Head: Normocephalic and atraumatic.   Eyes: Pupils are equal, round, and reactive to light. Right eye exhibits no discharge. Left eye exhibits no discharge.   Neck: Neck supple. No JVD present.   Cardiovascular: Normal rate, regular rhythm, S1 normal, S2 normal and normal heart sounds.   No murmur heard.  Pulmonary/Chest: Effort normal. No respiratory distress. He has no wheezes. He has rales (faint at bases).   AICD site well-healed   Abdominal: Soft. He exhibits no distension.   Musculoskeletal: He exhibits edema (trace BLE).   Neurological: He is alert and oriented to person, place, and time.   Skin: Skin is warm and dry. He is not diaphoretic. No erythema.   Psychiatric: He has a normal mood and affect. His behavior is normal.   Nursing note and vitals reviewed.    Impression: ABNORMAL MYOCARDIAL PERFUSION  1. There is mild to moderate intensity fixed defect in the anteroapical wall of the left ventricle, consistent with myocardial injury, moderate intensity fixed defects in the apical and anterolateral walls of the left ventricle, consistent with   myocardial injury, and a moderate size fixed defect of severe intensity that extends from the base to the apical inferolateral wall of the left ventricle, consistent with myocardial injury. There is " trivial-mild sweta-injury ischemia.   2. There is abnormal wall motion at rest showing akinesis of the inferolateral wall of the left ventricle.   3. There is resting LV dysfunction with a reduced ejection fraction of 28 %.   4. The ventricular volumes are normal at rest and stress.   5. The extracardiac distribution of radioactivity is normal.     2D Echo CONCLUSIONS     1 - Moderate left ventricular enlargement.     2 - Eccentric hypertrophy.     3 - No wall motion abnormalities.     4 - Severely depressed left ventricular systolic function (EF 15-20%).     5 - Right ventricular enlargement with moderately depressed systolic function.     6 - Pulmonary hypertension. The estimated PA systolic pressure is 77 mmHg.     7 - Moderate aortic regurgitation.     8 - Mild mitral regurgitation.     9 - Mild to moderate tricuspid regurgitation.     10 - Increased central venous pressure.   Assessment:       1. Chronic combined systolic and diastolic congestive heart failure    2. Biventricular ICD (implantable cardioverter-defibrillator) in place    3. CAD, multiple vessel    4. Hyperlipidemia associated with type 2 diabetes mellitus    5. Ischemic cardiomyopathy    6. LBBB (left bundle branch block)    7. Nonrheumatic aortic valve insufficiency    8. NSVT (nonsustained ventricular tachycardia)    9. Type 2 diabetes mellitus with stage 3 chronic kidney disease, with long-term current use of insulin    10. Anemia due to stage 3 chronic kidney disease    11. CKD (chronic kidney disease) stage 3, GFR 30-59 ml/min      Patient presents today for f/u. Still appears overloaded clinically, suspect he will need additional doses of metolazone. Will await labs and make further rec's accordingly. Counseled on low salt diet and fluid restrictions.  Plan:   -Will wait labs, suspect he will need metolazone, further rec's to follow  -Continue current medical management and risk factor modification  -Cardiac, low salt diet, < 2 gram Na/day;  50 oz fluid restriction  -Follow-up TBA pending med adjustments/review of labs

## 2019-12-30 ENCOUNTER — TELEPHONE (OUTPATIENT)
Dept: CARDIOLOGY | Facility: HOSPITAL | Age: 64
End: 2019-12-30

## 2019-12-30 NOTE — TELEPHONE ENCOUNTER
I spoke with pts wife. He took metolazone Wed 12/25, Friday 12/27 and today    bumex is taken 2.5mg MWF and Tu, th, sat, sun he takes 1.5tab bid.

## 2019-12-30 NOTE — TELEPHONE ENCOUNTER
"I spoke with pt. He says his symptoms are "the same" . Still with sob/real if he tries to do anything/any activity. Denies any sob at rest. Denies any LE swelling, pnd or orthopnea. Says he is sleeping comfortably on 1 pillow.   "

## 2019-12-31 NOTE — TELEPHONE ENCOUNTER
The patient has been notified of this information and all questions answered.  Phone review scheduled to f/u Friday  Advised ER if symptoms become severe

## 2019-12-31 NOTE — TELEPHONE ENCOUNTER
Phone review at end of the week. They usually will use metolazone prn.    Needs f/u clinic appt in 3 weeks

## 2020-01-03 NOTE — TELEPHONE ENCOUNTER
I called pt. No worsening sob or swelling. He denies any PND or orthopnea. They will call if any worsening HF symptoms or having to use metolazone more frequently

## 2020-01-09 DIAGNOSIS — Z79.4 TYPE 2 DIABETES MELLITUS WITH STAGE 3 CHRONIC KIDNEY DISEASE, WITH LONG-TERM CURRENT USE OF INSULIN: ICD-10-CM

## 2020-01-09 DIAGNOSIS — N18.30 TYPE 2 DIABETES MELLITUS WITH STAGE 3 CHRONIC KIDNEY DISEASE, WITH LONG-TERM CURRENT USE OF INSULIN: ICD-10-CM

## 2020-01-09 DIAGNOSIS — E11.22 TYPE 2 DIABETES MELLITUS WITH STAGE 3 CHRONIC KIDNEY DISEASE, WITH LONG-TERM CURRENT USE OF INSULIN: ICD-10-CM

## 2020-01-09 RX ORDER — INSULIN GLARGINE 100 [IU]/ML
INJECTION, SOLUTION SUBCUTANEOUS
Qty: 30 ML | Refills: 11 | Status: ON HOLD | OUTPATIENT
Start: 2020-01-09 | End: 2020-06-02 | Stop reason: SDUPTHER

## 2020-02-05 ENCOUNTER — HOSPITAL ENCOUNTER (OUTPATIENT)
Dept: RADIOLOGY | Facility: HOSPITAL | Age: 65
Discharge: HOME | End: 2020-02-05
Attending: FAMILY MEDICINE
Payer: MEDICARE

## 2020-02-05 ENCOUNTER — OFFICE VISIT (OUTPATIENT)
Dept: INTERNAL MEDICINE | Facility: CLINIC | Age: 65
End: 2020-02-05
Payer: MEDICARE

## 2020-02-05 VITALS
WEIGHT: 209 LBS | SYSTOLIC BLOOD PRESSURE: 114 MMHG | OXYGEN SATURATION: 96 % | RESPIRATION RATE: 20 BRPM | HEIGHT: 68 IN | TEMPERATURE: 98 F | DIASTOLIC BLOOD PRESSURE: 88 MMHG | HEART RATE: 89 BPM | BODY MASS INDEX: 31.67 KG/M2

## 2020-02-05 DIAGNOSIS — Z79.4 TYPE 2 DIABETES MELLITUS WITH STAGE 4 CHRONIC KIDNEY DISEASE, WITH LONG-TERM CURRENT USE OF INSULIN: ICD-10-CM

## 2020-02-05 DIAGNOSIS — G47.34 NOCTURNAL HYPOXEMIA: ICD-10-CM

## 2020-02-05 DIAGNOSIS — I15.2 HYPERTENSION ASSOCIATED WITH DIABETES: ICD-10-CM

## 2020-02-05 DIAGNOSIS — E11.59 HYPERTENSION ASSOCIATED WITH DIABETES: ICD-10-CM

## 2020-02-05 DIAGNOSIS — E11.22 TYPE 2 DIABETES MELLITUS WITH STAGE 4 CHRONIC KIDNEY DISEASE, WITH LONG-TERM CURRENT USE OF INSULIN: ICD-10-CM

## 2020-02-05 DIAGNOSIS — R06.2 WHEEZING: ICD-10-CM

## 2020-02-05 DIAGNOSIS — R05.8 COUGH PRODUCTIVE OF CLEAR SPUTUM: Primary | ICD-10-CM

## 2020-02-05 DIAGNOSIS — I25.10 CAD, MULTIPLE VESSEL: ICD-10-CM

## 2020-02-05 DIAGNOSIS — I27.20 PULMONARY HTN: ICD-10-CM

## 2020-02-05 DIAGNOSIS — I50.42 CHRONIC COMBINED SYSTOLIC AND DIASTOLIC CONGESTIVE HEART FAILURE: ICD-10-CM

## 2020-02-05 DIAGNOSIS — Z95.810 BIVENTRICULAR ICD (IMPLANTABLE CARDIOVERTER-DEFIBRILLATOR) IN PLACE: Chronic | ICD-10-CM

## 2020-02-05 DIAGNOSIS — N18.4 TYPE 2 DIABETES MELLITUS WITH STAGE 4 CHRONIC KIDNEY DISEASE, WITH LONG-TERM CURRENT USE OF INSULIN: ICD-10-CM

## 2020-02-05 DIAGNOSIS — R05.8 COUGH PRODUCTIVE OF CLEAR SPUTUM: ICD-10-CM

## 2020-02-05 DIAGNOSIS — I25.5 ISCHEMIC CARDIOMYOPATHY: ICD-10-CM

## 2020-02-05 DIAGNOSIS — E66.9 OBESITY (BMI 30.0-34.9): ICD-10-CM

## 2020-02-05 PROBLEM — N18.30 CKD (CHRONIC KIDNEY DISEASE) STAGE 3, GFR 30-59 ML/MIN: Status: RESOLVED | Noted: 2019-01-23 | Resolved: 2020-02-05

## 2020-02-05 PROCEDURE — 99214 OFFICE O/P EST MOD 30 MIN: CPT | Mod: S$PBB,,, | Performed by: FAMILY MEDICINE

## 2020-02-05 PROCEDURE — 71046 XR CHEST PA AND LATERAL: ICD-10-PCS | Mod: 26,,, | Performed by: RADIOLOGY

## 2020-02-05 PROCEDURE — 99213 OFFICE O/P EST LOW 20 MIN: CPT | Mod: PBBFAC,25 | Performed by: FAMILY MEDICINE

## 2020-02-05 PROCEDURE — 99214 PR OFFICE/OUTPT VISIT, EST, LEVL IV, 30-39 MIN: ICD-10-PCS | Mod: S$PBB,,, | Performed by: FAMILY MEDICINE

## 2020-02-05 PROCEDURE — 71046 X-RAY EXAM CHEST 2 VIEWS: CPT | Mod: TC

## 2020-02-05 PROCEDURE — 71046 X-RAY EXAM CHEST 2 VIEWS: CPT | Mod: 26,,, | Performed by: RADIOLOGY

## 2020-02-05 PROCEDURE — 99999 PR PBB SHADOW E&M-EST. PATIENT-LVL III: CPT | Mod: PBBFAC,,, | Performed by: FAMILY MEDICINE

## 2020-02-05 PROCEDURE — 99999 PR PBB SHADOW E&M-EST. PATIENT-LVL III: ICD-10-PCS | Mod: PBBFAC,,, | Performed by: FAMILY MEDICINE

## 2020-02-05 RX ORDER — ALBUTEROL SULFATE 90 UG/1
2 AEROSOL, METERED RESPIRATORY (INHALATION) EVERY 6 HOURS PRN
Qty: 18 G | Refills: 0 | Status: SHIPPED | OUTPATIENT
Start: 2020-02-05 | End: 2020-03-18 | Stop reason: SDUPTHER

## 2020-02-05 RX ORDER — DIPHENOXYLATE HYDROCHLORIDE AND ATROPINE SULFATE 2.5; .025 MG/1; MG/1
TABLET ORAL
COMMUNITY
Start: 2019-12-16 | End: 2020-02-05

## 2020-02-05 RX ORDER — PROMETHAZINE HYDROCHLORIDE AND DEXTROMETHORPHAN HYDROBROMIDE 6.25; 15 MG/5ML; MG/5ML
5 SYRUP ORAL NIGHTLY PRN
Qty: 118 ML | Refills: 0 | Status: SHIPPED | OUTPATIENT
Start: 2020-02-05 | End: 2020-02-15

## 2020-02-05 NOTE — PROGRESS NOTES
Subjective:       Patient ID: Yan Choudhury is a 64 y.o. male.    Chief Complaint: Cough and Wheezing    64-year-old  male patient accompanied by his wife with Patient Active Problem List:     Hypertension associated with diabetes     ED (erectile dysfunction)     Ischemic cardiomyopathy     Pulmonary HTN     Hyperlipidemia associated with type 2 diabetes mellitus     Loss of eye - Right Eye     Abnormal ECG     LBBB (left bundle branch block)     Abnormal stress test     Chronic combined systolic and diastolic congestive heart failure     Cysts of eyelids     Primary open-angle glaucoma, moderate stage     Biventricular ICD (implantable cardioverter-defibrillator) in place     Anemia due to stage 3 chronic kidney disease     Type 2 diabetes mellitus with kidney complication, with long-term current use of insulin     Gastroesophageal reflux disease     Chronic gout without tophus     Vitamin D deficiency     Onychomycosis of multiple toenails with type 2 diabetes mellitus     Prosthetic eye globe     Screening for colon cancer     History of colostomy reversal     Nocturnal hypoxemia     Alteration in skin integrity     Nonrheumatic aortic valve insufficiency     NSVT (nonsustained ventricular tachycardia)     Hyperparathyroidism     CAD, multiple vessel  Here reports that patient has been having productive cough and wheezing off and on for the past 1 week.  Denies any chest pain or difficulty breathing, patient reports clear sputum.  Has not tried taking any over-the-counter cough medication for relief.   Denies any similar complaints in the past  Patient has not been weighing himself regularly with congestive heart failure but taking his medications regularly.  Denies any leg swelling nausea vomiting.   Denies any upper respiratory symptoms like congestion, runny nose  Patient reported that since worsening of kidney functions reported 1st week of January patient has discontinued taking metolazone  "which he was taking Monday Wednesday Friday, but continues to take Bumex daily    Review of Systems   Constitutional: Negative for appetite change, chills, fatigue and fever.   HENT: Negative for congestion, postnasal drip and sinus pressure.    Eyes: Negative for visual disturbance.   Respiratory: Positive for cough and wheezing. Negative for shortness of breath.    Cardiovascular: Negative for chest pain, palpitations and leg swelling.   Gastrointestinal: Negative for abdominal pain, nausea and vomiting.   Musculoskeletal: Negative for myalgias.   Skin: Negative for rash.   Neurological: Negative for headaches.   Psychiatric/Behavioral: Negative for sleep disturbance.         /88 (BP Location: Right arm, Patient Position: Sitting, BP Method: Large (Manual))   Pulse 89   Temp 97.5 °F (36.4 °C) (Oral)   Resp 20   Ht 5' 8" (1.727 m)   Wt 94.8 kg (208 lb 15.9 oz)   SpO2 96%   BMI 31.78 kg/m²   Objective:      Physical Exam   Constitutional: He is oriented to person, place, and time. He appears well-developed and well-nourished.   HENT:   Head: Normocephalic and atraumatic.   Right Ear: External ear normal.   Left Ear: External ear normal.   Mouth/Throat: Oropharynx is clear and moist. No oropharyngeal exudate.   Cardiovascular: Normal rate, regular rhythm and normal heart sounds.   No murmur heard.  Pulmonary/Chest: Effort normal and breath sounds normal. No respiratory distress. He has no wheezes. He exhibits no tenderness.   Abdominal: Soft. Bowel sounds are normal. There is no tenderness.   Musculoskeletal: He exhibits no edema.   Neurological: He is alert and oriented to person, place, and time.   Skin: Skin is warm and dry. No rash noted.   Psychiatric: He has a normal mood and affect.         Assessment/Plan:   1. Cough productive of clear sputum  - CBC auto differential; Future  - Comprehensive metabolic panel; Future  - X-Ray Chest PA And Lateral; Future  - promethazine-dextromethorphan " (PROMETHAZINE-DM) 6.25-15 mg/5 mL Syrp; Take 5 mLs by mouth nightly as needed.  Dispense: 118 mL; Refill: 0  2. Wheezing  - CBC auto differential; Future  - Comprehensive metabolic panel; Future  - X-Ray Chest PA And Lateral; Future  - albuterol (PROVENTIL/VENTOLIN HFA) 90 mcg/actuation inhaler; Inhale 2 puffs into the lungs every 6 (six) hours as needed for Wheezing. Rescue  Dispense: 18 g; Refill: 0  Albuterol inhaler will be prescribed today for as needed for wheezing and promethazine DM cough syrup will be sent for cough  Encouraged to drink adequate fluids  Will get chest x-ray and will check further labs to see if there is any worsening of congestive heart failure  Could be secondary to allergies    3. Hypertension associated with diabetes  Blood pressure stable today     4. Pulmonary HTN  5. Chronic combined systolic and diastolic congestive heart failure  - Brain natriuretic peptide; Future  6. Nocturnal hypoxemia  7. Biventricular ICD (implantable cardioverter-defibrillator) in place  10. CAD, multiple vessel  8. Ischemic cardiomyopathy    Followed by Cardiology currently on Entresto  Using 2 L of oxygen  Currently discontinued metolazone secondary to worsening kidney functions with stage IV, which patient was taking Monday Wednesday Friday on metolazone 5 mg  Continues to take Bumex 2 mg daily  Encouraged to monitor weights regularly and restrict salt intake    9. Type 2 diabetes mellitus with stage 4 chronic kidney disease, with long-term current use of insulin  Currently taking Lantus 50 units twice daily    11. Obesity (BMI 30.0-34.9)

## 2020-02-07 DIAGNOSIS — R79.89 ELEVATED BRAIN NATRIURETIC PEPTIDE (BNP) LEVEL: Primary | ICD-10-CM

## 2020-02-14 ENCOUNTER — LAB VISIT (OUTPATIENT)
Dept: LAB | Facility: HOSPITAL | Age: 65
End: 2020-02-14
Attending: FAMILY MEDICINE
Payer: MEDICARE

## 2020-02-14 DIAGNOSIS — R79.89 ELEVATED BRAIN NATRIURETIC PEPTIDE (BNP) LEVEL: ICD-10-CM

## 2020-02-14 LAB — BNP SERPL-MCNC: 296 PG/ML (ref 0–99)

## 2020-02-14 PROCEDURE — 36415 COLL VENOUS BLD VENIPUNCTURE: CPT

## 2020-02-14 PROCEDURE — 83880 ASSAY OF NATRIURETIC PEPTIDE: CPT

## 2020-02-21 RX ORDER — METOLAZONE 5 MG/1
5 TABLET ORAL DAILY PRN
Qty: 30 TABLET | Refills: 0 | OUTPATIENT
Start: 2020-02-21 | End: 2021-02-20

## 2020-02-26 RX ORDER — METOLAZONE 5 MG/1
5 TABLET ORAL DAILY PRN
Qty: 30 TABLET | Refills: 0 | OUTPATIENT
Start: 2020-02-26 | End: 2021-02-25

## 2020-03-05 NOTE — TELEPHONE ENCOUNTER
Spoke with wife, Jessica to advise her that patient is off of Metolazone.  Continue Bumex daily as ordered. Jessica able to repeat instructions given.

## 2020-03-07 RX ORDER — METOLAZONE 5 MG/1
5 TABLET ORAL DAILY PRN
Qty: 30 TABLET | Refills: 0 | OUTPATIENT
Start: 2020-03-07 | End: 2021-03-07

## 2020-03-17 ENCOUNTER — HOSPITAL ENCOUNTER (OUTPATIENT)
Dept: CARDIOLOGY | Facility: HOSPITAL | Age: 65
Discharge: HOME | End: 2020-03-17
Attending: INTERNAL MEDICINE
Payer: MEDICARE

## 2020-03-17 DIAGNOSIS — I25.5 ISCHEMIC CARDIOMYOPATHY: ICD-10-CM

## 2020-03-17 DIAGNOSIS — I47.29 NSVT (NONSUSTAINED VENTRICULAR TACHYCARDIA): ICD-10-CM

## 2020-03-17 DIAGNOSIS — I50.42 CHRONIC COMBINED SYSTOLIC AND DIASTOLIC CONGESTIVE HEART FAILURE: ICD-10-CM

## 2020-03-17 DIAGNOSIS — Z95.810 ICD (IMPLANTABLE CARDIOVERTER-DEFIBRILLATOR) IN PLACE: ICD-10-CM

## 2020-03-17 PROCEDURE — 93296 REM INTERROG EVL PM/IDS: CPT

## 2020-03-17 PROCEDURE — 93295 DEV INTERROG REMOTE 1/2/MLT: CPT | Mod: ,,, | Performed by: INTERNAL MEDICINE

## 2020-03-17 PROCEDURE — 93295 CARDIAC DEVICE CHECK - REMOTE: ICD-10-PCS | Mod: ,,, | Performed by: INTERNAL MEDICINE

## 2020-03-18 DIAGNOSIS — R06.2 WHEEZING: ICD-10-CM

## 2020-03-18 RX ORDER — ALBUTEROL SULFATE 90 UG/1
2 AEROSOL, METERED RESPIRATORY (INHALATION) EVERY 6 HOURS PRN
Qty: 18 G | Refills: 0 | Status: SHIPPED | OUTPATIENT
Start: 2020-03-18 | End: 2021-03-10

## 2020-03-18 RX ORDER — ALLOPURINOL 100 MG/1
100 TABLET ORAL DAILY
Qty: 90 TABLET | Refills: 0 | Status: SHIPPED | OUTPATIENT
Start: 2020-03-18 | End: 2020-06-30

## 2020-03-18 NOTE — TELEPHONE ENCOUNTER
----- Message from Jacquie Reddy sent at 3/18/2020 11:37 AM CDT -----  Contact:   .Type:  RX Refill Request    Who Called:  pt  Refill or New Rx: refill   RX Name and Strength: albuterol inhaler and gout medication   How is the patient currently taking it? (ex. 1XDay):  Is this a 30 day or 90 day RX:  Preferred Pharmacy with phone number:       [facility]/pharmacy #[id number] - [place] - [address] [facility]  [address] [facility] [address]  Phone: [phone] Fax: [phone]    Local or Mail Order: local   Ordering Provider:   Would the patient rather a call back or a response via [facility]?  call  Best Call Back Number: [phone] (home)   Additional Information:

## 2020-03-20 LAB
AV DELAY - LONGEST: 160 MSEC
AV DELAY - SHORTEST: 110 MSEC
CHARGE TIME (SEC): 9.6 SEC
HV IMPEDANCE (OHM): 59 OHM
IMPEDANCE RA LEAD (DONOR): 790 OHMS
IMPEDANCE RA LEAD (NATIVE): 410 OHMS
IMPEDANCE RA LEAD: 340 OHMS
OHS CV DC PP MS1: 0.5 MS
OHS CV DC PP MS2: 0.5 MS
OHS CV DC PP MS3: 0.5 MS
OHS CV DC PP V1: NORMAL V
OHS CV DC PP V2: NORMAL V
OHS CV DC PP V3: NORMAL V
P/R-WAVE RA LEAD (NATIVE): 12 MV
P/R-WAVE RA LEAD: 4.3 MV
THRESHOLD MS RA LEAD (DONOR): 0.5 MS
THRESHOLD MS RA LEAD (NATIVE): 0.5 MS
THRESHOLD MS RA LEAD: 0.5 MS
THRESHOLD V RA LEAD (DONOR): 0.75 V
THRESHOLD V RA LEAD (NATIVE): 0.5 V
THRESHOLD V RA LEAD: 0.75 V
VV DELAY: 15 MSEC

## 2020-04-03 DIAGNOSIS — E11.22 TYPE 2 DIABETES MELLITUS WITH STAGE 3 CHRONIC KIDNEY DISEASE, WITH LONG-TERM CURRENT USE OF INSULIN: ICD-10-CM

## 2020-04-03 DIAGNOSIS — Z79.4 TYPE 2 DIABETES MELLITUS WITH STAGE 3 CHRONIC KIDNEY DISEASE, WITH LONG-TERM CURRENT USE OF INSULIN: ICD-10-CM

## 2020-04-03 DIAGNOSIS — N18.30 TYPE 2 DIABETES MELLITUS WITH STAGE 3 CHRONIC KIDNEY DISEASE, WITH LONG-TERM CURRENT USE OF INSULIN: ICD-10-CM

## 2020-04-03 NOTE — TELEPHONE ENCOUNTER
----- Message from Echo Calvillo sent at 4/3/2020  1:06 PM CDT -----  Contact:  wife ms Choudhury [phone]  Would like to consult with the nurse, patient has been trying to get a refill on his medication and would like to speak with the nurse concerning this, please call back at  [phone].Type:  RX Refill Request    Who Called:  Ms choudhury   Refill or New Rx: Strips  RX Name and Strength:  How is the patient currently taking it? (ex. 1XDay):once a day  Is this a 30 day or 90 day RX:90  Preferred Pharmacy with phone number:.  [facility]/pharmacy #[id number] - [place] - [address] [facility] AT AT [facility]  [address] [facility]  [place] [address]  Phone: [phone] Fax: [phone]      Local or Mail Order: local  Ordering Provider:dr johansen  Would the patient rather a call back or a response via [facility]? callback  Best Call Back Number:[phone]  Additional Information:

## 2020-04-08 ENCOUNTER — TELEPHONE (OUTPATIENT)
Dept: INTERNAL MEDICINE | Facility: CLINIC | Age: 65
End: 2020-04-08

## 2020-04-08 NOTE — TELEPHONE ENCOUNTER
S/w pt wife regarding test strips and advised her they were sent on 4/3/20 to [facility] and the pharmacy confirmed receipt was at 1315 that day. She verbalized understanding.

## 2020-04-08 NOTE — TELEPHONE ENCOUNTER
----- Message from Kaylynn Mueller sent at 4/8/2020 12:56 PM CDT -----  Contact: Elsie/wife  Elsie needs a call back at [phone], Regards to the status of his test strips refill.    Thanks  Td

## 2020-04-19 DIAGNOSIS — I50.42 CHRONIC COMBINED SYSTOLIC AND DIASTOLIC CONGESTIVE HEART FAILURE: ICD-10-CM

## 2020-04-19 RX ORDER — SACUBITRIL AND VALSARTAN 24; 26 MG/1; MG/1
TABLET, FILM COATED ORAL
Qty: 60 TABLET | Refills: 12 | Status: ON HOLD | OUTPATIENT
Start: 2020-04-19 | End: 2021-05-03 | Stop reason: HOSPADM

## 2020-04-27 ENCOUNTER — PATIENT OUTREACH (OUTPATIENT)
Dept: ADMINISTRATIVE | Facility: OTHER | Age: 65
End: 2020-04-27

## 2020-04-27 DIAGNOSIS — N18.4 TYPE 2 DIABETES MELLITUS WITH STAGE 4 CHRONIC KIDNEY DISEASE, WITH LONG-TERM CURRENT USE OF INSULIN: Primary | ICD-10-CM

## 2020-04-27 DIAGNOSIS — Z79.4 TYPE 2 DIABETES MELLITUS WITH STAGE 4 CHRONIC KIDNEY DISEASE, WITH LONG-TERM CURRENT USE OF INSULIN: Primary | ICD-10-CM

## 2020-04-27 DIAGNOSIS — E11.22 TYPE 2 DIABETES MELLITUS WITH STAGE 4 CHRONIC KIDNEY DISEASE, WITH LONG-TERM CURRENT USE OF INSULIN: Primary | ICD-10-CM

## 2020-04-27 NOTE — PROGRESS NOTES
Chart reviewed.   Requested updates from Care Everywhere.  Immunizations reconciled.    updated.  Placed an order for hemoglobin A1C.

## 2020-04-29 ENCOUNTER — TELEPHONE (OUTPATIENT)
Dept: CARDIOLOGY | Facility: CLINIC | Age: 65
End: 2020-04-29

## 2020-04-29 NOTE — TELEPHONE ENCOUNTER
Spoke with patient's wife, Jessica, to advise her of scheduled appointment on 5/5/2020 at 3PM at [facility].  Wife confirmed appointment.

## 2020-04-29 NOTE — TELEPHONE ENCOUNTER
----- Message from Daniel Hamilton LPN sent at 4/29/2020  1:37 PM CDT -----  Weight gain of 6 pounds since Sunday.  SOB and LE edema.  Please advise.  ----- Message -----  From: Lauren Marino  Sent: 4/29/2020  12:32 PM CDT  To: Rajinder NOLASCO Staff    Type:  Needs Medical Advice    Who Called: Jessica / wife  Symptoms (please be specific): pt has fluid build up and weight gain // both feet are swollen   How long has patient had these symptoms: 2 days   Pharmacy name and phone #: [facility] [address] [facility].  Would the patient rather a call back or a response via [facility]? Call back  Best Call Back Number: [phone]  Additional Information: please call back

## 2020-04-29 NOTE — TELEPHONE ENCOUNTER
I have attempted without success to contact this patient by phone to advise of appointment scheduled for 5/5/2020, at 3PM at [facility].  L/M to call office.

## 2020-05-04 ENCOUNTER — PATIENT OUTREACH (OUTPATIENT)
Dept: ADMINISTRATIVE | Facility: OTHER | Age: 65
End: 2020-05-04

## 2020-05-05 ENCOUNTER — LAB VISIT (OUTPATIENT)
Dept: LAB | Facility: HOSPITAL | Age: 65
End: 2020-05-05
Attending: INTERNAL MEDICINE
Payer: MEDICARE

## 2020-05-05 ENCOUNTER — OFFICE VISIT (OUTPATIENT)
Dept: CARDIOLOGY | Facility: CLINIC | Age: 65
End: 2020-05-05
Payer: MEDICARE

## 2020-05-05 VITALS
BODY MASS INDEX: 35.09 KG/M2 | HEART RATE: 86 BPM | DIASTOLIC BLOOD PRESSURE: 74 MMHG | SYSTOLIC BLOOD PRESSURE: 106 MMHG | HEIGHT: 68 IN | OXYGEN SATURATION: 93 % | WEIGHT: 231.5 LBS

## 2020-05-05 DIAGNOSIS — E78.5 HYPERLIPIDEMIA ASSOCIATED WITH TYPE 2 DIABETES MELLITUS: ICD-10-CM

## 2020-05-05 DIAGNOSIS — I15.2 HYPERTENSION ASSOCIATED WITH DIABETES: ICD-10-CM

## 2020-05-05 DIAGNOSIS — E66.01 CLASS 2 SEVERE OBESITY DUE TO EXCESS CALORIES WITH SERIOUS COMORBIDITY AND BODY MASS INDEX (BMI) OF 35.0 TO 35.9 IN ADULT: ICD-10-CM

## 2020-05-05 DIAGNOSIS — I50.43 ACUTE ON CHRONIC COMBINED SYSTOLIC AND DIASTOLIC HEART FAILURE: Primary | ICD-10-CM

## 2020-05-05 DIAGNOSIS — Z79.4 TYPE 2 DIABETES MELLITUS WITH STAGE 4 CHRONIC KIDNEY DISEASE, WITH LONG-TERM CURRENT USE OF INSULIN: ICD-10-CM

## 2020-05-05 DIAGNOSIS — N18.30 ANEMIA DUE TO STAGE 3 CHRONIC KIDNEY DISEASE: ICD-10-CM

## 2020-05-05 DIAGNOSIS — I25.9 CHRONIC ISCHEMIC HEART DISEASE: ICD-10-CM

## 2020-05-05 DIAGNOSIS — I47.29 NSVT (NONSUSTAINED VENTRICULAR TACHYCARDIA): ICD-10-CM

## 2020-05-05 DIAGNOSIS — I35.1 NONRHEUMATIC AORTIC VALVE INSUFFICIENCY: ICD-10-CM

## 2020-05-05 DIAGNOSIS — E11.22 TYPE 2 DIABETES MELLITUS WITH STAGE 4 CHRONIC KIDNEY DISEASE, WITH LONG-TERM CURRENT USE OF INSULIN: ICD-10-CM

## 2020-05-05 DIAGNOSIS — I50.42 CHRONIC COMBINED SYSTOLIC AND DIASTOLIC CONGESTIVE HEART FAILURE: ICD-10-CM

## 2020-05-05 DIAGNOSIS — N18.4 TYPE 2 DIABETES MELLITUS WITH STAGE 4 CHRONIC KIDNEY DISEASE, WITH LONG-TERM CURRENT USE OF INSULIN: ICD-10-CM

## 2020-05-05 DIAGNOSIS — E11.69 HYPERLIPIDEMIA ASSOCIATED WITH TYPE 2 DIABETES MELLITUS: ICD-10-CM

## 2020-05-05 DIAGNOSIS — I25.10 CAD, MULTIPLE VESSEL: ICD-10-CM

## 2020-05-05 DIAGNOSIS — I27.20 PULMONARY HTN: ICD-10-CM

## 2020-05-05 DIAGNOSIS — Z95.810 BIVENTRICULAR ICD (IMPLANTABLE CARDIOVERTER-DEFIBRILLATOR) IN PLACE: Chronic | ICD-10-CM

## 2020-05-05 DIAGNOSIS — I25.5 ISCHEMIC CARDIOMYOPATHY: ICD-10-CM

## 2020-05-05 DIAGNOSIS — R94.31 ABNORMAL ECG: ICD-10-CM

## 2020-05-05 DIAGNOSIS — R94.39 ABNORMAL STRESS TEST: ICD-10-CM

## 2020-05-05 DIAGNOSIS — D63.1 ANEMIA DUE TO STAGE 3 CHRONIC KIDNEY DISEASE: ICD-10-CM

## 2020-05-05 DIAGNOSIS — E11.59 HYPERTENSION ASSOCIATED WITH DIABETES: ICD-10-CM

## 2020-05-05 DIAGNOSIS — I44.7 LBBB (LEFT BUNDLE BRANCH BLOCK): ICD-10-CM

## 2020-05-05 LAB
BASOPHILS # BLD AUTO: 0.03 K/UL (ref 0–0.2)
BASOPHILS NFR BLD: 0.7 % (ref 0–1.9)
DIFFERENTIAL METHOD: ABNORMAL
EOSINOPHIL # BLD AUTO: 0.1 K/UL (ref 0–0.5)
EOSINOPHIL NFR BLD: 2.1 % (ref 0–8)
ERYTHROCYTE [DISTWIDTH] IN BLOOD BY AUTOMATED COUNT: 19 % (ref 11.5–14.5)
HCT VFR BLD AUTO: 39.5 % (ref 40–54)
HGB BLD-MCNC: 11.7 G/DL (ref 14–18)
IMM GRANULOCYTES # BLD AUTO: 0.01 K/UL (ref 0–0.04)
IMM GRANULOCYTES NFR BLD AUTO: 0.2 % (ref 0–0.5)
LYMPHOCYTES # BLD AUTO: 1.4 K/UL (ref 1–4.8)
LYMPHOCYTES NFR BLD: 31.3 % (ref 18–48)
MCH RBC QN AUTO: 24.9 PG (ref 27–31)
MCHC RBC AUTO-ENTMCNC: 29.6 G/DL (ref 32–36)
MCV RBC AUTO: 84 FL (ref 82–98)
MONOCYTES # BLD AUTO: 0.7 K/UL (ref 0.3–1)
MONOCYTES NFR BLD: 16.1 % (ref 4–15)
NEUTROPHILS # BLD AUTO: 2.2 K/UL (ref 1.8–7.7)
NEUTROPHILS NFR BLD: 49.6 % (ref 38–73)
NRBC BLD-RTO: 0 /100 WBC
PLATELET # BLD AUTO: 139 K/UL (ref 150–350)
PMV BLD AUTO: ABNORMAL FL (ref 9.2–12.9)
RBC # BLD AUTO: 4.7 M/UL (ref 4.6–6.2)
WBC # BLD AUTO: 4.35 K/UL (ref 3.9–12.7)

## 2020-05-05 PROCEDURE — 84443 ASSAY THYROID STIM HORMONE: CPT

## 2020-05-05 PROCEDURE — 85025 COMPLETE CBC W/AUTO DIFF WBC: CPT

## 2020-05-05 PROCEDURE — 99999 PR PBB SHADOW E&M-EST. PATIENT-LVL III: CPT | Mod: PBBFAC,,, | Performed by: INTERNAL MEDICINE

## 2020-05-05 PROCEDURE — 99213 OFFICE O/P EST LOW 20 MIN: CPT | Mod: PBBFAC | Performed by: INTERNAL MEDICINE

## 2020-05-05 PROCEDURE — 99215 OFFICE O/P EST HI 40 MIN: CPT | Mod: S$PBB,,, | Performed by: INTERNAL MEDICINE

## 2020-05-05 PROCEDURE — 80053 COMPREHEN METABOLIC PANEL: CPT

## 2020-05-05 PROCEDURE — 83880 ASSAY OF NATRIURETIC PEPTIDE: CPT

## 2020-05-05 PROCEDURE — 99999 PR PBB SHADOW E&M-EST. PATIENT-LVL III: ICD-10-PCS | Mod: PBBFAC,,, | Performed by: INTERNAL MEDICINE

## 2020-05-05 PROCEDURE — 99215 PR OFFICE/OUTPT VISIT, EST, LEVL V, 40-54 MIN: ICD-10-PCS | Mod: S$PBB,,, | Performed by: INTERNAL MEDICINE

## 2020-05-05 PROCEDURE — 36415 COLL VENOUS BLD VENIPUNCTURE: CPT

## 2020-05-05 NOTE — PROGRESS NOTES
Subjective:    Patient ID:  Yan Choudhury is a 64 y.o. male who presents for evaluation of Hypertension; Congestive Heart Failure; Coronary Artery Disease; Hyperlipidemia; and Risk Factor Management For Atherosclerosis      HPI Pt presents for f/u.   His current medical conditions include CHF, CAD/ICM, DCM, LBBB, HTN, CRI, DM, CRT-ICD, PTHN, dyslipidemia, obesity. Nonsmoker.   Past hx pertinent for following:  LHC was done 2009 showed diffuse CAD, and severe distal CAD involving apical LAD, distal RCA/distal LCX.   Echo Oct 2010 showed LVEF 25%.   PET stress 2016 showed inferior scar, no significant ischemia noted and LVEF < 35%.   s/p BIV ICD 3/16 for CHF/LBBB.  S/p admission 4/19 for acute on chronic CHF exacerbation.  Echo April 2019 EF 15 - 20%, LVE, mod AI, mild-mod TR, mild MR, severe PHTN 77 mmHg.  ecg 4/2/19 V pacing.  ICD interrogation 3/19 normal device function, NSVT.  Stress MPI 6/19 multivessel scar, trivial ischemia, EF 28%.  Now here.  Weight up 30 pounds since I last saw pt in December.  Pt states he is mindful of low salt diet.  He has chronic LIM, unchanged per pt.  No pnd/orthopnea.  He usually appears short of breath sitting as baseline.   CAD is stable. No active anginal sxs on current medical tx.  BNP levels have fluctuated a lot.  Was very higher earlier in year then improved.  No longer takes Metolazone due to renal function.  Takes Bumex 2 mg qd.  ICD last interrogation showed normal device function.  ICD has not fired.  He has increased leg edema.  HTN is controlled on current medical tx.  Creatinine 2.4 last check.  DM HGAIC well controlled on last labs.  Lipids well controlled, on statin.  He used to be on Coreg but was taken off 2019 due to low BP issues.  Last ecg 6/19 paced rhythm.    Current Outpatient Medications:     albuterol (PROVENTIL/VENTOLIN HFA) 90 mcg/actuation inhaler, Inhale 2 puffs into the lungs every 6 (six) hours as needed for Wheezing. Rescue, Disp: 18 g, Rfl: 0     allopurinoL (ZYLOPRIM) 100 MG tablet, Take 1 tablet (100 mg total) by mouth once daily., Disp: 90 tablet, Rfl: 0    aspirin (ECOTRIN) 81 MG EC tablet, Take 81 mg by mouth every morning. , Disp: , Rfl:     bimatoprost (LUMIGAN) 0.03 % ophthalmic drops, Place 1 drop into the left eye every evening., Disp: 2.5 mL, Rfl: 11    brimonidine-timolol (COMBIGAN) 0.2-0.5 % Drop, Place 1 drop into both eyes every 12 (twelve) hours., Disp: 10 mL, Rfl: 6    bumetanide (BUMEX) 2 MG tablet, TAKE 1 TABLET (2 MG TOTAL) BY MOUTH ONCE DAILY., Disp: 90 tablet, Rfl: 3    cholecalciferol, vitamin D3, (VITAMIN D3) 1,000 unit capsule, Take 1,000 Units by mouth once daily., Disp: , Rfl:     dorzolamide-timolol 2-0.5% (COSOPT) 22.3-6.8 mg/mL ophthalmic solution, Place 1 drop into both eyes 2 (two) times daily., Disp: 10 mL, Rfl: 2    ENTRESTO 24-26 mg per tablet, TAKE 1 TABLET BY MOUTH TWICE A DAY, Disp: 60 tablet, Rfl: 12    insulin glargine (LANTUS U-100 INSULIN) 100 unit/mL injection, INJECT 50 UNITS TWICE DAILY AS NEEDED WHEN BS< 150, Disp: 30 mL, Rfl: 11    IRON/VITAMIN B COMPLEX (GERITOL ORAL), Take by mouth., Disp: , Rfl:     ketoconazole (NIZORAL) 2 % cream, AAA bid prn rash of groin. For Flares., Disp: 60 g, Rfl: 3    latanoprost (XALATAN) 0.005 % ophthalmic solution, Place 1 drop into both eyes once daily., Disp: 2.5 mL, Rfl: 4    multivit-min/folic/vit K/lycop (MEN'S 50 PLUS MULTIVITAMIN ORAL), Take by mouth., Disp: , Rfl:     pantoprazole (PROTONIX) 40 MG tablet, TAKE 1 TABLET BY MOUTH EVERY DAY FOR ACID REFLUX, Disp: 90 tablet, Rfl: 1    simvastatin (ZOCOR) 10 MG tablet, Take 1 tablet (10 mg total) by mouth every evening., Disp: 90 tablet, Rfl: 4    BD INSULIN SYRINGE ULTRA-FINE 0.5 mL 31 gauge x 5/16 Syrg, USE AS DIRECTED TO INJECT INSULIN TWO TIMES DAILY, Disp: 60 each, Rfl: 11    blood sugar diagnostic Strp, 1 strip by Misc.(Non-Drug; Combo Route) route 4 (four) times daily. [employer], Disp: 120 strip, Rfl: 11    " lancets [facility], For tel care, Disp: 120 each, Rfl: 11    pen needle, diabetic (BD ULTRA-FINE JOSLYN PEN NEEDLE) 32 gauge x 5/32" Ndle, 1 each by Misc.(Non-Drug; Combo Route) route 4 (four) times daily., Disp: 100 each, Rfl: 11    Review of Systems   Constitution: Positive for weight gain.   HENT: Negative.    Eyes: Negative.    Cardiovascular: Positive for dyspnea on exertion and leg swelling.   Respiratory: Positive for shortness of breath.    Endocrine: Negative.    Hematologic/Lymphatic: Negative.    Skin: Negative.    Musculoskeletal: Negative.    Gastrointestinal: Negative.    Genitourinary: Negative.    Neurological: Positive for weakness.   Psychiatric/Behavioral: Negative.    Allergic/Immunologic: Negative.        /74 (BP Location: Left arm, Patient Position: Sitting, BP Method: Large (Manual))   Pulse 86   Ht 5' 8" (1.727 m)   Wt 105 kg (231 lb 7.7 oz)   SpO2 (!) 93%   BMI 35.20 kg/m²     Wt Readings from Last 3 Encounters:   05/05/20 105 kg (231 lb 7.7 oz)   02/05/20 94.8 kg (208 lb 15.9 oz)   12/23/19 92.5 kg (203 lb 14.8 oz)     Temp Readings from Last 3 Encounters:   02/05/20 97.5 °F (36.4 °C) (Oral)   10/08/19 96.9 °F (36.1 °C) (Tympanic)   04/24/19 100 °F (37.8 °C) (Tympanic)     BP Readings from Last 3 Encounters:   05/05/20 106/74   02/05/20 114/88   12/23/19 118/70     Pulse Readings from Last 3 Encounters:   05/05/20 86   02/05/20 89   12/23/19 84          Objective:    Physical Exam   Constitutional: He is oriented to person, place, and time. He appears well-developed and well-nourished.   HENT:   Head: Normocephalic.   Neck: Normal range of motion. Neck supple. Normal carotid pulses, no hepatojugular reflux and no JVD present. Carotid bruit is not present. No thyromegaly present.   Cardiovascular: Normal rate, regular rhythm, S1 normal and S2 normal. PMI is not displaced. Exam reveals no S3, no S4, no distant heart sounds, no friction rub, no midsystolic click and no opening snap.   No " murmur heard.  Pulses:       Radial pulses are 2+ on the right side, and 2+ on the left side.   Pulmonary/Chest: Effort normal and breath sounds normal. Tachypnea noted. He has no wheezes. He has no rales.   Abdominal: Soft. Bowel sounds are normal. He exhibits no distension, no abdominal bruit, no ascites and no mass. There is no tenderness.   obese   Musculoskeletal: He exhibits edema.   Neurological: He is alert and oriented to person, place, and time.   Skin: Skin is warm.   Psychiatric: He has a normal mood and affect. His behavior is normal.   Nursing note and vitals reviewed.      I have reviewed all pertinent labs and cardiac studies.      Chemistry        Component Value Date/Time     02/05/2020 1108    K 4.6 02/05/2020 1108     02/05/2020 1108    CO2 30 (H) 02/05/2020 1108    BUN 39 (H) 02/05/2020 1108    CREATININE 2.4 (H) 02/05/2020 1108     (H) 02/05/2020 1108        Component Value Date/Time    CALCIUM 9.4 02/05/2020 1108    ALKPHOS 66 02/05/2020 1108    AST 25 02/05/2020 1108    ALT 17 02/05/2020 1108    BILITOT 0.9 02/05/2020 1108    ESTGFRAFRICA 31.3 (A) 02/05/2020 1108    EGFRNONAA 27.1 (A) 02/05/2020 1108        Lab Results   Component Value Date    WBC 6.18 02/05/2020    HGB 12.7 (L) 02/05/2020    HCT 42.1 02/05/2020    MCV 91 02/05/2020     (L) 02/05/2020       Lab Results   Component Value Date    HGBA1C 6.0 (H) 10/08/2019     Lab Results   Component Value Date    CHOL 139 11/27/2019    CHOL 121 10/08/2019    CHOL 91 (L) 11/16/2018     Lab Results   Component Value Date    HDL 44 11/27/2019    HDL 34 (L) 10/08/2019    HDL 21 (L) 11/16/2018     Lab Results   Component Value Date    LDLCALC 73.2 11/27/2019    LDLCALC 55.4 (L) 10/08/2019    LDLCALC 46.2 (L) 11/16/2018     Lab Results   Component Value Date    TRIG 109 11/27/2019    TRIG 158 (H) 10/08/2019    TRIG 119 11/16/2018     Lab Results   Component Value Date    CHOLHDL 31.7 11/27/2019    CHOLHDL 28.1 10/08/2019     CHOLHDL 23.1 11/16/2018     Component      Latest Ref Rng & Units 2/14/2020             BNP      0 - 99 pg/mL 296 (H)         Component      Latest Ref Rng & Units 2/14/2020             BNP      0 - 99 pg/mL 296 (H)           Assessment:       1. Acute on chronic combined systolic and diastolic heart failure    2. Abnormal ECG    3. Abnormal stress test    4. Biventricular ICD (implantable cardioverter-defibrillator) in place    5. CAD, multiple vessel    6. Chronic combined systolic and diastolic congestive heart failure    7. Hyperlipidemia associated with type 2 diabetes mellitus    8. Hypertension associated with diabetes    9. Ischemic cardiomyopathy    10. LBBB (left bundle branch block)    11. NSVT (nonsustained ventricular tachycardia)    12. Nonrheumatic aortic valve insufficiency    13. Pulmonary HTN    14. Type 2 diabetes mellitus with stage 4 chronic kidney disease, with long-term current use of insulin    15. Anemia due to stage 3 chronic kidney disease    16. Class 2 severe obesity due to excess calories with serious comorbidity and body mass index (BMI) of 35.0 to 35.9 in adult    17. Chronic ischemic heart disease         Plan:             Worsening CHF sxs involving weight gain and leg edema but overall pt has learned to adapt to his CHF condition and normally does not complain much or minimizes symptoms.  Significant weight gain.  Needs more diuresis.  Increase Bumex from 2 mg qd to 2 mg bid for next 3 days.  Will review labs and then decide on what dose to stay on after the above mentioned 3 days.  Consider adding metolazone again in future -- used to take as needed.  Cardiac low salt diet.  Continue other meds.  Check CMP, CBC, TSH, BNP today.   Phone review asap.  Continue OMT for CAD.    CV conditions have been limited in past due to low BP (not on beta blocker now -- reconsider adding at future appts).  Reviewed all tests and above medical conditions with patient in detail and formulated  treatment plan.  Continue optimal medical treatment for cardiovascular conditions.  Daily walking exercise encouraged, as tolerated.  Continue f/u in ICD clinic.  F/u with Nephrology this month for chronic renal failure.  Keep DM HGAIC well controlled.  Phone review for labs and to decide timing of f/u visit.    Complex visit detailing numerous CV conditions and formulating tx plan of high complexity.

## 2020-05-06 ENCOUNTER — TELEPHONE (OUTPATIENT)
Dept: CARDIOLOGY | Facility: CLINIC | Age: 65
End: 2020-05-06

## 2020-05-06 DIAGNOSIS — I50.42 CHRONIC COMBINED SYSTOLIC AND DIASTOLIC CONGESTIVE HEART FAILURE: Primary | ICD-10-CM

## 2020-05-06 LAB
ALBUMIN SERPL BCP-MCNC: 3 G/DL (ref 3.5–5.2)
ALP SERPL-CCNC: 66 U/L (ref 55–135)
ALT SERPL W/O P-5'-P-CCNC: 26 U/L (ref 10–44)
ANION GAP SERPL CALC-SCNC: 7 MMOL/L (ref 8–16)
AST SERPL-CCNC: 42 U/L (ref 10–40)
BILIRUB SERPL-MCNC: 0.7 MG/DL (ref 0.1–1)
BNP SERPL-MCNC: 2486 PG/ML (ref 0–99)
BUN SERPL-MCNC: 44 MG/DL (ref 8–23)
CALCIUM SERPL-MCNC: 8.8 MG/DL (ref 8.7–10.5)
CHLORIDE SERPL-SCNC: 101 MMOL/L (ref 95–110)
CO2 SERPL-SCNC: 24 MMOL/L (ref 23–29)
CREAT SERPL-MCNC: 3 MG/DL (ref 0.5–1.4)
EST. GFR  (AFRICAN AMERICAN): 23.9 ML/MIN/1.73 M^2
EST. GFR  (NON AFRICAN AMERICAN): 20.7 ML/MIN/1.73 M^2
GLUCOSE SERPL-MCNC: 112 MG/DL (ref 70–110)
POTASSIUM SERPL-SCNC: 4.6 MMOL/L (ref 3.5–5.1)
PROT SERPL-MCNC: 6.8 G/DL (ref 6–8.4)
SODIUM SERPL-SCNC: 132 MMOL/L (ref 136–145)
TSH SERPL DL<=0.005 MIU/L-ACNC: 1.57 UIU/ML (ref 0.4–4)

## 2020-05-07 NOTE — TELEPHONE ENCOUNTER
Spoke with patient and wife to advise that Heart failure blood test much higher.  Needs to stay on Bumex 2 mg bid dose for next week until we recheck his labs again, on 5/14.  Patient and wife verbalized understanding.  Denies questions/concerns..

## 2020-05-07 NOTE — TELEPHONE ENCOUNTER
Please call pt's wife.  Heart failure blood test much higher.  Needs to stay on Bumex 2 mg bid dose for next week until we recheck his labs again.    Schedule CMP, BNP one week.    Dr Calvillo

## 2020-05-08 ENCOUNTER — LAB VISIT (OUTPATIENT)
Dept: LAB | Facility: HOSPITAL | Age: 65
End: 2020-05-08
Attending: INTERNAL MEDICINE
Payer: MEDICARE

## 2020-05-08 DIAGNOSIS — N18.4 CKD (CHRONIC KIDNEY DISEASE) STAGE 4, GFR 15-29 ML/MIN: ICD-10-CM

## 2020-05-08 LAB
ALBUMIN SERPL BCP-MCNC: 3 G/DL (ref 3.5–5.2)
ANION GAP SERPL CALC-SCNC: 7 MMOL/L (ref 8–16)
BUN SERPL-MCNC: 42 MG/DL (ref 8–23)
CALCIUM SERPL-MCNC: 9 MG/DL (ref 8.7–10.5)
CHLORIDE SERPL-SCNC: 98 MMOL/L (ref 95–110)
CO2 SERPL-SCNC: 27 MMOL/L (ref 23–29)
CREAT SERPL-MCNC: 2.8 MG/DL (ref 0.5–1.4)
EST. GFR  (AFRICAN AMERICAN): 26 ML/MIN/1.73 M^2
EST. GFR  (NON AFRICAN AMERICAN): 22.5 ML/MIN/1.73 M^2
GLUCOSE SERPL-MCNC: 155 MG/DL (ref 70–110)
PHOSPHATE SERPL-MCNC: 2.5 MG/DL (ref 2.7–4.5)
POTASSIUM SERPL-SCNC: 4.8 MMOL/L (ref 3.5–5.1)
SODIUM SERPL-SCNC: 132 MMOL/L (ref 136–145)

## 2020-05-08 PROCEDURE — 36415 COLL VENOUS BLD VENIPUNCTURE: CPT

## 2020-05-08 PROCEDURE — 80069 RENAL FUNCTION PANEL: CPT

## 2020-05-14 ENCOUNTER — LAB VISIT (OUTPATIENT)
Dept: LAB | Facility: HOSPITAL | Age: 65
End: 2020-05-14
Attending: INTERNAL MEDICINE
Payer: MEDICARE

## 2020-05-14 ENCOUNTER — PATIENT OUTREACH (OUTPATIENT)
Dept: ADMINISTRATIVE | Facility: OTHER | Age: 65
End: 2020-05-14

## 2020-05-14 DIAGNOSIS — I50.42 CHRONIC COMBINED SYSTOLIC AND DIASTOLIC CONGESTIVE HEART FAILURE: ICD-10-CM

## 2020-05-14 LAB
ALBUMIN SERPL BCP-MCNC: 2.9 G/DL (ref 3.5–5.2)
ALP SERPL-CCNC: 80 U/L (ref 55–135)
ALT SERPL W/O P-5'-P-CCNC: 17 U/L (ref 10–44)
ANION GAP SERPL CALC-SCNC: 6 MMOL/L (ref 8–16)
AST SERPL-CCNC: 37 U/L (ref 10–40)
BILIRUB SERPL-MCNC: 1 MG/DL (ref 0.1–1)
BUN SERPL-MCNC: 34 MG/DL (ref 8–23)
CALCIUM SERPL-MCNC: 8.7 MG/DL (ref 8.7–10.5)
CHLORIDE SERPL-SCNC: 96 MMOL/L (ref 95–110)
CO2 SERPL-SCNC: 31 MMOL/L (ref 23–29)
CREAT SERPL-MCNC: 3 MG/DL (ref 0.5–1.4)
EST. GFR  (AFRICAN AMERICAN): 23.7 ML/MIN/1.73 M^2
EST. GFR  (NON AFRICAN AMERICAN): 20.5 ML/MIN/1.73 M^2
GLUCOSE SERPL-MCNC: 140 MG/DL (ref 70–110)
POTASSIUM SERPL-SCNC: 4.2 MMOL/L (ref 3.5–5.1)
PROT SERPL-MCNC: 6.5 G/DL (ref 6–8.4)
SODIUM SERPL-SCNC: 133 MMOL/L (ref 136–145)

## 2020-05-14 PROCEDURE — 80053 COMPREHEN METABOLIC PANEL: CPT

## 2020-05-14 PROCEDURE — 83880 ASSAY OF NATRIURETIC PEPTIDE: CPT

## 2020-05-14 PROCEDURE — 36415 COLL VENOUS BLD VENIPUNCTURE: CPT

## 2020-05-15 ENCOUNTER — OFFICE VISIT (OUTPATIENT)
Dept: NEPHROLOGY | Facility: CLINIC | Age: 65
End: 2020-05-15
Payer: COMMERCIAL

## 2020-05-15 ENCOUNTER — HOSPITAL ENCOUNTER (EMERGENCY)
Facility: HOSPITAL | Age: 65
Discharge: HOME | End: 2020-05-15
Attending: EMERGENCY MEDICINE
Payer: COMMERCIAL

## 2020-05-15 VITALS
OXYGEN SATURATION: 98 % | BODY MASS INDEX: 35.31 KG/M2 | RESPIRATION RATE: 19 BRPM | HEART RATE: 91 BPM | TEMPERATURE: 98 F | SYSTOLIC BLOOD PRESSURE: 140 MMHG | HEIGHT: 68 IN | WEIGHT: 233 LBS | DIASTOLIC BLOOD PRESSURE: 87 MMHG

## 2020-05-15 VITALS
RESPIRATION RATE: 20 BRPM | BODY MASS INDEX: 35.31 KG/M2 | HEIGHT: 68 IN | WEIGHT: 233 LBS | DIASTOLIC BLOOD PRESSURE: 90 MMHG | HEART RATE: 100 BPM | SYSTOLIC BLOOD PRESSURE: 140 MMHG

## 2020-05-15 DIAGNOSIS — I50.9 ACUTE ON CHRONIC CONGESTIVE HEART FAILURE, UNSPECIFIED HEART FAILURE TYPE: Primary | ICD-10-CM

## 2020-05-15 DIAGNOSIS — N18.4 CKD (CHRONIC KIDNEY DISEASE) STAGE 4, GFR 15-29 ML/MIN: Primary | ICD-10-CM

## 2020-05-15 DIAGNOSIS — R06.02 SOB (SHORTNESS OF BREATH): ICD-10-CM

## 2020-05-15 DIAGNOSIS — N17.9 AKI (ACUTE KIDNEY INJURY): ICD-10-CM

## 2020-05-15 LAB
ACANTHOCYTES BLD QL SMEAR: PRESENT
ALBUMIN SERPL BCP-MCNC: 3 G/DL (ref 3.5–5.2)
ALP SERPL-CCNC: 77 U/L (ref 55–135)
ALT SERPL W/O P-5'-P-CCNC: 19 U/L (ref 10–44)
ANION GAP SERPL CALC-SCNC: 11 MMOL/L (ref 8–16)
ANISOCYTOSIS BLD QL SMEAR: ABNORMAL
AST SERPL-CCNC: 41 U/L (ref 10–40)
BACTERIA #/AREA URNS HPF: NORMAL /HPF
BASOPHILS # BLD AUTO: 0.04 K/UL (ref 0–0.2)
BASOPHILS NFR BLD: 0.9 % (ref 0–1.9)
BILIRUB SERPL-MCNC: 1.2 MG/DL (ref 0.1–1)
BILIRUB UR QL STRIP: NEGATIVE
BNP SERPL-MCNC: 1837 PG/ML (ref 0–99)
BNP SERPL-MCNC: 2291 PG/ML (ref 0–99)
BUN SERPL-MCNC: 35 MG/DL (ref 8–23)
CALCIUM SERPL-MCNC: 8.2 MG/DL (ref 8.7–10.5)
CHLORIDE SERPL-SCNC: 96 MMOL/L (ref 95–110)
CK SERPL-CCNC: 512 U/L (ref 20–200)
CLARITY UR: CLEAR
CO2 SERPL-SCNC: 23 MMOL/L (ref 23–29)
COLOR UR: YELLOW
CREAT SERPL-MCNC: 2.8 MG/DL (ref 0.5–1.4)
DACRYOCYTES BLD QL SMEAR: ABNORMAL
DIFFERENTIAL METHOD: ABNORMAL
EOSINOPHIL # BLD AUTO: 0.1 K/UL (ref 0–0.5)
EOSINOPHIL NFR BLD: 2.2 % (ref 0–8)
ERYTHROCYTE [DISTWIDTH] IN BLOOD BY AUTOMATED COUNT: 19 % (ref 11.5–14.5)
EST. GFR  (AFRICAN AMERICAN): 26 ML/MIN/1.73 M^2
EST. GFR  (NON AFRICAN AMERICAN): 22 ML/MIN/1.73 M^2
GLUCOSE SERPL-MCNC: 111 MG/DL (ref 70–110)
GLUCOSE UR QL STRIP: NEGATIVE
HCT VFR BLD AUTO: 38.2 % (ref 40–54)
HCV AB SERPL QL IA: NEGATIVE
HGB BLD-MCNC: 11.8 G/DL (ref 14–18)
HGB UR QL STRIP: ABNORMAL
HYALINE CASTS #/AREA URNS LPF: 0 /LPF
HYPOCHROMIA BLD QL SMEAR: ABNORMAL
IMM GRANULOCYTES # BLD AUTO: 0.01 K/UL (ref 0–0.04)
IMM GRANULOCYTES NFR BLD AUTO: 0.2 % (ref 0–0.5)
KETONES UR QL STRIP: NEGATIVE
LEUKOCYTE ESTERASE UR QL STRIP: NEGATIVE
LYMPHOCYTES # BLD AUTO: 1.6 K/UL (ref 1–4.8)
LYMPHOCYTES NFR BLD: 36 % (ref 18–48)
MCH RBC QN AUTO: 24.3 PG (ref 27–31)
MCHC RBC AUTO-ENTMCNC: 30.9 G/DL (ref 32–36)
MCV RBC AUTO: 79 FL (ref 82–98)
MICROSCOPIC COMMENT: NORMAL
MONOCYTES # BLD AUTO: 0.7 K/UL (ref 0.3–1)
MONOCYTES NFR BLD: 15.9 % (ref 4–15)
NEUTROPHILS # BLD AUTO: 2 K/UL (ref 1.8–7.7)
NEUTROPHILS NFR BLD: 44.8 % (ref 38–73)
NITRITE UR QL STRIP: NEGATIVE
NRBC BLD-RTO: 0 /100 WBC
OVALOCYTES BLD QL SMEAR: ABNORMAL
PH UR STRIP: 6 [PH] (ref 5–8)
PLATELET # BLD AUTO: 115 K/UL (ref 150–350)
PLATELET BLD QL SMEAR: ABNORMAL
PMV BLD AUTO: ABNORMAL FL (ref 9.2–12.9)
POIKILOCYTOSIS BLD QL SMEAR: ABNORMAL
POLYCHROMASIA BLD QL SMEAR: ABNORMAL
POTASSIUM SERPL-SCNC: 4.3 MMOL/L (ref 3.5–5.1)
PROT SERPL-MCNC: 6.5 G/DL (ref 6–8.4)
PROT UR QL STRIP: ABNORMAL
RBC # BLD AUTO: 4.86 M/UL (ref 4.6–6.2)
RBC #/AREA URNS HPF: 0 /HPF (ref 0–4)
SARS-COV-2 RDRP RESP QL NAA+PROBE: NEGATIVE
SODIUM SERPL-SCNC: 130 MMOL/L (ref 136–145)
SP GR UR STRIP: 1.01 (ref 1–1.03)
SQUAMOUS #/AREA URNS HPF: 2 /HPF
STOMATOCYTES BLD QL SMEAR: PRESENT
TARGETS BLD QL SMEAR: ABNORMAL
TROPONIN I SERPL DL<=0.01 NG/ML-MCNC: 0.05 NG/ML (ref 0–0.03)
URN SPEC COLLECT METH UR: ABNORMAL
UROBILINOGEN UR STRIP-ACNC: NEGATIVE EU/DL
WBC # BLD AUTO: 4.53 K/UL (ref 3.9–12.7)
WBC #/AREA URNS HPF: 0 /HPF (ref 0–5)

## 2020-05-15 PROCEDURE — 93010 EKG 12-LEAD: ICD-10-PCS | Mod: ,,, | Performed by: INTERNAL MEDICINE

## 2020-05-15 PROCEDURE — 99285 EMERGENCY DEPT VISIT HI MDM: CPT | Mod: 25,27

## 2020-05-15 PROCEDURE — 86803 HEPATITIS C AB TEST: CPT

## 2020-05-15 PROCEDURE — 96374 THER/PROPH/DIAG INJ IV PUSH: CPT

## 2020-05-15 PROCEDURE — 85025 COMPLETE CBC W/AUTO DIFF WBC: CPT

## 2020-05-15 PROCEDURE — 99214 PR OFFICE/OUTPT VISIT, EST, LEVL IV, 30-39 MIN: ICD-10-PCS | Mod: S$PBB,,, | Performed by: INTERNAL MEDICINE

## 2020-05-15 PROCEDURE — 80053 COMPREHEN METABOLIC PANEL: CPT

## 2020-05-15 PROCEDURE — 36415 COLL VENOUS BLD VENIPUNCTURE: CPT

## 2020-05-15 PROCEDURE — 99213 OFFICE O/P EST LOW 20 MIN: CPT | Mod: PBBFAC,25,27 | Performed by: INTERNAL MEDICINE

## 2020-05-15 PROCEDURE — 93010 ELECTROCARDIOGRAM REPORT: CPT | Mod: ,,, | Performed by: INTERNAL MEDICINE

## 2020-05-15 PROCEDURE — 93005 ELECTROCARDIOGRAM TRACING: CPT

## 2020-05-15 PROCEDURE — 84484 ASSAY OF TROPONIN QUANT: CPT

## 2020-05-15 PROCEDURE — 99214 OFFICE O/P EST MOD 30 MIN: CPT | Mod: S$PBB,,, | Performed by: INTERNAL MEDICINE

## 2020-05-15 PROCEDURE — 81000 URINALYSIS NONAUTO W/SCOPE: CPT

## 2020-05-15 PROCEDURE — U0002 COVID-19 LAB TEST NON-CDC: HCPCS

## 2020-05-15 PROCEDURE — 63600175 PHARM REV CODE 636 W HCPCS: Performed by: EMERGENCY MEDICINE

## 2020-05-15 PROCEDURE — 82550 ASSAY OF CK (CPK): CPT

## 2020-05-15 PROCEDURE — 99999 PR PBB SHADOW E&M-EST. PATIENT-LVL III: ICD-10-PCS | Mod: PBBFAC,,, | Performed by: INTERNAL MEDICINE

## 2020-05-15 PROCEDURE — 83880 ASSAY OF NATRIURETIC PEPTIDE: CPT

## 2020-05-15 PROCEDURE — 99999 PR PBB SHADOW E&M-EST. PATIENT-LVL III: CPT | Mod: PBBFAC,,, | Performed by: INTERNAL MEDICINE

## 2020-05-15 RX ORDER — FUROSEMIDE 10 MG/ML
40 INJECTION INTRAMUSCULAR; INTRAVENOUS
Status: COMPLETED | OUTPATIENT
Start: 2020-05-15 | End: 2020-05-15

## 2020-05-15 RX ADMIN — FUROSEMIDE 40 MG: 10 INJECTION, SOLUTION INTRAMUSCULAR; INTRAVENOUS at 12:05

## 2020-05-15 NOTE — ED PROVIDER NOTES
SCRIBE #1 NOTE: I, Quinn Perez, am scribing for, and in the presence of, Joshua Grissom MD. I have scribed the entire note.       History     Chief Complaint   Patient presents with    Shortness of Breath     hx of CHF, worsening over the last week, also reporting legs are more swollen than normal     Review of patient's allergies indicates:  No Known Allergies      History of Present Illness     HPI    5/15/2020, 10:26 AM  History obtained from the patient      History of Present Illness: Yan Choudhury is a 65 y.o. male patient with a PMHx of CHF who presents to the Emergency Department for evaluation of shortness of breath which is chronic but has exacerbated over the past week. Symptoms are constant and moderate in severity. No mitigating factors reported. Pt reports sx exacerbation with lying down and exertion. Associated sxs include BLE edema. Patient denies any cough, rhinorrhea, CP, dizziness, HA, and all other sxs at this time. No prior Tx reported. No further complaints or concerns at this time.       Arrival mode: Personal vehicle    PCP: Sandra Estevez MD        Past Medical History:  Past Medical History:   Diagnosis Date    Arthritis     CAD (coronary artery disease)     Cataract     Chronic combined systolic and diastolic congestive heart failure 3/24/2015    CKD (chronic kidney disease), stage III     Diabetes mellitus type II      AM    Diabetic retinopathy     anti Veg F injections for macular edema    Diverticulitis of colon     ED (erectile dysfunction)     Encounter for blood transfusion     Glaucoma     HTN (hypertension)     Hyperlipidemia     Ischemic cardiomyopathy     Obesity     JAHAIRA on CPAP     Pacemaker     Pulmonary HTN        Past Surgical History:  Past Surgical History:   Procedure Laterality Date    CARDIAC CATHETERIZATION  2009    CHOLECYSTECTOMY      COLON SURGERY      Sigmoid resection    COLONOSCOPY N/A 10/11/2018    Procedure: COLONOSCOPY;   Surgeon: Quinn Aragon MD;  Location: [facility] ENDO;  Service: General;  Laterality: N/A;    EYE SURGERY      FLEXIBLE SIGMOIDOSCOPY N/A 1/18/2019    Procedure: SIGMOIDOSCOPY, FLEXIBLE;  Surgeon: Quinn Aragon MD;  Location: [facility] ENDO;  Service: General;  Laterality: N/A;    ILEOSTOMY N/A 10/30/2018    Procedure: CREATION, ILEOSTOMY;  Surgeon: Kevin Lindsay MD;  Location: [facility] OR;  Service: General;  Laterality: N/A;    Ileostomy reversal  01/2019    KNEE SURGERY      MOBILIZATION OF SPLENIC FLEXURE N/A 10/30/2018    Procedure: MOBILIZATION, SPLENIC FLEXURE;  Surgeon: Kevin Lindsay MD;  Location: [facility] OR;  Service: General;  Laterality: N/A;    REVISION COLOSTOMY N/A 10/30/2018    Procedure: REVISION OR CLOSURE, COLOSTOMY;  Surgeon: Kevin Lindsay MD;  Location: [facility] OR;  Service: General;  Laterality: N/A;  Parastomal Hernia Repair    RIGHT HEMICOLECTOMY Right 10/30/2018    Procedure: HEMICOLECTOMY, RIGHT;  Surgeon: Kevin Lindsay MD;  Location: [facility] OR;  Service: General;  Laterality: Right;         Family History:  Family History   Problem Relation Age of Onset    Cancer Mother     Heart disease Father     Stroke Father     No Known Problems Sister     No Known Problems Brother     No Known Problems Maternal Aunt     No Known Problems Maternal Uncle     No Known Problems Paternal Aunt     No Known Problems Paternal Uncle     No Known Problems Maternal Grandmother     No Known Problems Maternal Grandfather     No Known Problems Paternal Grandmother     No Known Problems Paternal Grandfather     Amblyopia Neg Hx     Blindness Neg Hx     Cataracts Neg Hx     Diabetes Neg Hx     Glaucoma Neg Hx     Hypertension Neg Hx     Macular degeneration Neg Hx     Retinal detachment Neg Hx     Strabismus Neg Hx     Thyroid disease Neg Hx        Social History:  Social History     Tobacco Use    Smoking status: Never Smoker    Smokeless tobacco: Never Used   Substance and  Sexual Activity    Alcohol use: No     Alcohol/week: 0.0 standard drinks     Frequency: Never    Drug use: No    Sexual activity: Not Currently        Review of Systems     Review of Systems   Constitutional: Negative for chills and fever.   HENT: Negative for rhinorrhea and sore throat.    Respiratory: Positive for shortness of breath (Worse when lying down or with exertion). Negative for cough.    Cardiovascular: Positive for leg swelling (Bilateral). Negative for chest pain.   Gastrointestinal: Negative for abdominal pain, diarrhea, nausea and vomiting.   Genitourinary: Negative for dysuria.   Musculoskeletal: Negative for back pain, neck pain and neck stiffness.   Skin: Negative for rash and wound.   Neurological: Negative for dizziness, weakness, light-headedness, numbness and headaches.   All other systems reviewed and are negative.     Physical Exam     Initial Vitals   BP Pulse Resp Temp SpO2   05/15/20 1019 05/15/20 1019 05/15/20 1019 05/15/20 1038 05/15/20 1019   (!) 133/96 92 19 98.5 °F (36.9 °C) 99 %      MAP       --                 Physical Exam  Nursing Notes and Vital Signs Reviewed.  Constitutional: Patient is in no acute distress. Well-developed and well-nourished.  Head: Atraumatic. Normocephalic.  Eyes: PERRL. EOM intact. Conjunctivae are not pale. No scleral icterus.  ENT: Mucous membranes are moist. Oropharynx is clear and symmetric.    Neck: Supple. Full ROM.  Cardiovascular: Regular rate. Regular rhythm. No murmurs, rubs, or gallops. Distal pulses are 2+ and symmetric.  Pulmonary/Chest: No respiratory distress. Clear to auscultation bilaterally. No wheezing or rales.  Abdominal: Soft and non-distended. There is no tenderness to palpation. No rebound or guarding.  Genitourinary: No CVA tenderness  Musculoskeletal: Moves all extremities. No obvious deformities. There is 3+ BLE pitting edema. No calf tenderness.  Skin: Warm and dry.  Neurological:  Alert, awake, and appropriate.  Normal  "speech.  No acute focal neurological deficits are appreciated.  Psychiatric: Normal affect. Good eye contact. Appropriate in content.     ED Course   Critical Care  Date/Time: 5/15/2020 1:01 PM  Performed by: Joshua Grissom MD  Authorized by: Joshua Grissom MD   Direct patient critical care time: 15 minutes  Additional history critical care time: 5 minutes  Ordering / reviewing critical care time: 15 minutes  Documentation critical care time: 10 minutes  Total critical care time (exclusive of procedural time) : 45 minutes  Critical care time was exclusive of separately billable procedures and treating other patients and teaching time.  Critical care was necessary to treat or prevent imminent or life-threatening deterioration of the following conditions: cardiac failure (CHF exacerbation).  Critical care was time spent personally by me on the following activities: blood draw for specimens, development of treatment plan with patient or surrogate, interpretation of cardiac output measurements, evaluation of patient's response to treatment, examination of patient, obtaining history from patient or surrogate, ordering and review of laboratory studies, ordering and performing treatments and interventions, ordering and review of radiographic studies, re-evaluation of patient's condition, pulse oximetry and review of old charts.        ED Vital Signs:  Vitals:    05/15/20 1017 05/15/20 1019 05/15/20 1020 05/15/20 1038   BP:  (!) 133/96 (!) 133/96    Pulse:  92 92    Resp:  19 (!) 22    Temp:    98.5 °F (36.9 °C)   TempSrc:    Oral   SpO2:  99% (!) 90%    Weight: 105.7 kg (233 lb)      Height: 5' 8" (1.727 m)       05/15/20 1040 05/15/20 1230 05/15/20 1305   BP: 128/80 (!) 138/91 (!) 140/87   Pulse: 88 81 91   Resp: 20 18 19   Temp:   98.4 °F (36.9 °C)   TempSrc:   Oral   SpO2: 100% 100% 98%   Weight:      Height:          Abnormal Lab Results:  Labs Reviewed   COMPREHENSIVE METABOLIC PANEL - Abnormal; Notable for " the following components:       Result Value    Sodium 130 (*)     Glucose 111 (*)     BUN, Bld 35 (*)     Creatinine 2.8 (*)     Calcium 8.2 (*)     Albumin 3.0 (*)     Total Bilirubin 1.2 (*)     AST 41 (*)     eGFR if  26 (*)     eGFR if non  22 (*)     All other components within normal limits   URINALYSIS, REFLEX TO URINE CULTURE - Abnormal; Notable for the following components:    Protein, UA 2+ (*)     Occult Blood UA Trace (*)     All other components within normal limits    Narrative:     Preferred Collection Type->Urine, Clean Catch   CK - Abnormal; Notable for the following components:     (*)     All other components within normal limits   TROPONIN I - Abnormal; Notable for the following components:    Troponin I 0.048 (*)     All other components within normal limits   CBC W/ AUTO DIFFERENTIAL - Abnormal; Notable for the following components:    Hemoglobin 11.8 (*)     Hematocrit 38.2 (*)     Mean Corpuscular Volume 79 (*)     Mean Corpuscular Hemoglobin 24.3 (*)     Mean Corpuscular Hemoglobin Conc 30.9 (*)     RDW 19.0 (*)     Platelets 115 (*)     Mono% 15.9 (*)     Platelet Estimate Decreased (*)     All other components within normal limits   B-TYPE NATRIURETIC PEPTIDE - Abnormal; Notable for the following components:    BNP 2,291 (*)     All other components within normal limits   SARS-COV-2 RNA AMPLIFICATION, QUAL    Narrative:     What symptom criteria does the patient meet?->Shortness of  breath or difficulty breathing   HEPATITIS C ANTIBODY   URINALYSIS MICROSCOPIC        All Lab Results:  Results for orders placed or performed during the hospital encounter of 05/15/20   Comprehensive metabolic panel   Result Value Ref Range    Sodium 130 (L) 136 - 145 mmol/L    Potassium 4.3 3.5 - 5.1 mmol/L    Chloride 96 95 - 110 mmol/L    CO2 23 23 - 29 mmol/L    Glucose 111 (H) 70 - 110 mg/dL    BUN, Bld 35 (H) 8 - 23 mg/dL    Creatinine 2.8 (H) 0.5 - 1.4 mg/dL     Calcium 8.2 (L) 8.7 - 10.5 mg/dL    Total Protein 6.5 6.0 - 8.4 g/dL    Albumin 3.0 (L) 3.5 - 5.2 g/dL    Total Bilirubin 1.2 (H) 0.1 - 1.0 mg/dL    Alkaline Phosphatase 77 55 - 135 U/L    AST 41 (H) 10 - 40 U/L    ALT 19 10 - 44 U/L    Anion Gap 11 8 - 16 mmol/L    eGFR if African American 26 (A) >60 mL/min/1.73 m^2    eGFR if non African American 22 (A) >60 mL/min/1.73 m^2   Urinalysis, Reflex to Urine Culture Urine, Clean Catch   Result Value Ref Range    Specimen UA Urine, Clean Catch     Color, UA Yellow Yellow, Straw, Polina    Appearance, UA Clear Clear    pH, UA 6.0 5.0 - 8.0    Specific Gravity, UA 1.010 1.005 - 1.030    Protein, UA 2+ (A) Negative    Glucose, UA Negative Negative    Ketones, UA Negative Negative    Bilirubin (UA) Negative Negative    Occult Blood UA Trace (A) Negative    Nitrite, UA Negative Negative    Urobilinogen, UA Negative <2.0 EU/dL    Leukocytes, UA Negative Negative   CK   Result Value Ref Range     (H) 20 - 200 U/L   Troponin I   Result Value Ref Range    Troponin I 0.048 (H) 0.000 - 0.026 ng/mL   COVID-19 Rapid Screening   Result Value Ref Range    SARS-CoV-2 RNA, Amplification, Qual Negative Negative   Hepatitis C antibody   Result Value Ref Range    Hepatitis C Ab Negative Negative   CBC auto differential   Result Value Ref Range    WBC 4.53 3.90 - 12.70 K/uL    RBC 4.86 4.60 - 6.20 M/uL    Hemoglobin 11.8 (L) 14.0 - 18.0 g/dL    Hematocrit 38.2 (L) 40.0 - 54.0 %    Mean Corpuscular Volume 79 (L) 82 - 98 fL    Mean Corpuscular Hemoglobin 24.3 (L) 27.0 - 31.0 pg    Mean Corpuscular Hemoglobin Conc 30.9 (L) 32.0 - 36.0 g/dL    RDW 19.0 (H) 11.5 - 14.5 %    Platelets 115 (L) 150 - 350 K/uL    MPV SEE COMMENT 9.2 - 12.9 fL    Immature Granulocytes 0.2 0.0 - 0.5 %    Gran # (ANC) 2.0 1.8 - 7.7 K/uL    Immature Grans (Abs) 0.01 0.00 - 0.04 K/uL    Lymph # 1.6 1.0 - 4.8 K/uL    Mono # 0.7 0.3 - 1.0 K/uL    Eos # 0.1 0.0 - 0.5 K/uL    Baso # 0.04 0.00 - 0.20 K/uL    nRBC 0 0  /100 WBC    Gran% 44.8 38.0 - 73.0 %    Lymph% 36.0 18.0 - 48.0 %    Mono% 15.9 (H) 4.0 - 15.0 %    Eosinophil% 2.2 0.0 - 8.0 %    Basophil% 0.9 0.0 - 1.9 %    Platelet Estimate Decreased (A)     Aniso Moderate     Poik Moderate     Poly Occasional     Hypo Occasional     Ovalocytes Moderate     Target Cells Occasional     Tear Drop Cells Occasional     Stomatocytes Present     Acanthocytes Present     Differential Method Automated    Brain natriuretic peptide   Result Value Ref Range    BNP 2,291 (H) 0 - 99 pg/mL     *Note: Due to a large number of results and/or encounters for the requested time period, some results have not been displayed. A complete set of results can be found in Results Review.       Imaging Results:  Imaging Results          X-Ray Chest AP Portable (Final result)  Result time 05/15/20 12:11:00    Final result by Asif Isaacs MD (05/15/20 12:11:00)                 Impression:      No acute radiographic abnormality in the chest.      Electronically signed by: Asif Isaacs  Date:    05/15/2020  Time:    12:11             Narrative:    EXAMINATION:  XR CHEST AP PORTABLE    CLINICAL HISTORY:  sob;    TECHNIQUE:  Single frontal view of the chest was performed.    COMPARISON:  Chest radiograph 02/05/2020 with priors    FINDINGS:  Cardiac leads project over the chest.  Left-sided AICD with transvenous leads in stable position.  Cardiomediastinal silhouette is unchanged and enlarged.  Aortic atherosclerotic calcification.  Lungs are symmetrically expanded.  No acute or new consolidative opacity.  No large effusion.  No pneumothorax.  Visualized osseous structures appear intact.                                 The EKG was ordered, reviewed, and independently interpreted by the ED provider.  Interpretation time: 1012  Rate: 94 BPM  Rhythm: Atrial-sensed ventricular-paced rhythm  Interpretation: Biventricular pacemaker detected. No STEMI.           The Emergency Provider reviewed the vital signs  and test results, which are outlined above.     ED Discussion     1:00 PM: Reassessed pt at this time.  Pt states his condition has improved at this time. Discussed with pt all pertinent ED information and results. Discussed pt dx and plan of tx. Gave pt all f/u and return to the ED instructions. All questions and concerns were addressed at this time. Pt expresses understanding of information and instructions, and is comfortable with plan to discharge. Pt is stable for discharge.    I discussed with patient and/or family/caretaker that evaluation in the ED does not suggest any emergent or life threatening medical conditions requiring immediate intervention beyond what was provided in the ED, and I believe patient is safe for discharge.  Regardless, an unremarkable evaluation in the ED does not preclude the development or presence of a serious of life threatening condition. As such, patient was instructed to return immediately for any worsening or change in current symptoms.       Medical Decision Making:   Clinical Tests:   Lab Tests: Ordered and Reviewed  Radiological Study: Ordered and Reviewed  Medical Tests: Ordered and Reviewed           ED Medication(s):  Medications   furosemide injection 40 mg (40 mg Intravenous Given 5/15/20 1215)       Discharge Medication List as of 5/15/2020  1:00 PM          Follow-up Information     Sandra Estevez MD.    Specialty:  Family Medicine  Contact information:  [id number]  [place] [address] [phone]                       Scribe Attestation:   Scribe #1: I performed the above scribed service and the documentation accurately describes the services I performed. I attest to the accuracy of the note.     Attending:   Physician Attestation Statement for Scribe #1: I, Joshua Grissom MD, personally performed the services described in this documentation, as scribed by Quinn Perez, in my presence, and it is both accurate and complete.           Clinical  Impression       ICD-10-CM ICD-9-CM   1. Acute on chronic congestive heart failure, unspecified heart failure type I50.9 428.0   2. SOB (shortness of breath) R06.02 786.05       Disposition:   Disposition: Discharged  Condition: Stable       Joshua Grissom MD  05/15/20 7208

## 2020-05-15 NOTE — PROGRESS NOTES
PROGRESS NOTE FOR ESTABLISHED PATIENT    PHYSICIAN REQUESTING THE CONSULT: Dr. Sandra Estevez    REASON FOR VISIT: Renal insufficiency    65 y.o. male with history of CKD 3/4, HTN, CHF, anemia, DM2, CAD, pulmonary hypertension presents to the renal clinic for evaluation of renal insufficiency.       Patient had recent hospital stay at [facility] in 11/2018 when he presented with leak from colostomy reversal and BROOKE. Peak creatinine was 8.2 on 11/15/18. Renal function has recovered with IV fluids and creatinine has now declined to 2.7 (7/15/19).    July 22, 2019:  Patient today reports mild LE edema today.  No other issues.      December 10, 2019:  Patient feeling OK today. Renal function has slightly improved with creatinine at 2.4 (down from 2.7).     May 15, 2020:  Patient reports worsening LE edema and mild SOB.         Past Medical History:   Diagnosis Date    Arthritis     CAD (coronary artery disease)     Cataract     Chronic combined systolic and diastolic congestive heart failure 3/24/2015    CKD (chronic kidney disease), stage III     Diabetes mellitus type II      AM    Diabetic retinopathy     anti Veg F injections for macular edema    Diverticulitis of colon     ED (erectile dysfunction)     Encounter for blood transfusion     Glaucoma     HTN (hypertension)     Hyperlipidemia     Ischemic cardiomyopathy     Obesity     JAHAIRA on CPAP     Pacemaker     Pulmonary HTN        Past Surgical History:   Procedure Laterality Date    CARDIAC CATHETERIZATION  2009    CHOLECYSTECTOMY      COLON SURGERY      Sigmoid resection    COLONOSCOPY N/A 10/11/2018    Procedure: COLONOSCOPY;  Surgeon: Quinn Aragon MD;  Location: [facility];  Service: General;  Laterality: N/A;    EYE SURGERY      FLEXIBLE SIGMOIDOSCOPY N/A 1/18/2019    Procedure: SIGMOIDOSCOPY, FLEXIBLE;  Surgeon: Quinn Aragon MD;  Location: [facility];  Service: General;  Laterality: N/A;    ILEOSTOMY N/A 10/30/2018     Procedure: CREATION, ILEOSTOMY;  Surgeon: Kevin Lindsay MD;  Location: [facility] OR;  Service: General;  Laterality: N/A;    Ileostomy reversal  01/2019    KNEE SURGERY      MOBILIZATION OF SPLENIC FLEXURE N/A 10/30/2018    Procedure: MOBILIZATION, SPLENIC FLEXURE;  Surgeon: Kevin Lindsay MD;  Location: [facility] OR;  Service: General;  Laterality: N/A;    REVISION COLOSTOMY N/A 10/30/2018    Procedure: REVISION OR CLOSURE, COLOSTOMY;  Surgeon: Kevin Lindsay MD;  Location: [facility] OR;  Service: General;  Laterality: N/A;  Parastomal Hernia Repair    RIGHT HEMICOLECTOMY Right 10/30/2018    Procedure: HEMICOLECTOMY, RIGHT;  Surgeon: Kevin Lindsay MD;  Location: [facility] OR;  Service: General;  Laterality: Right;       Review of patient's allergies indicates:  No Known Allergies    Current Outpatient Medications   Medication Sig Dispense Refill    albuterol (PROVENTIL/VENTOLIN HFA) 90 mcg/actuation inhaler Inhale 2 puffs into the lungs every 6 (six) hours as needed for Wheezing. Rescue 18 g 0    allopurinoL (ZYLOPRIM) 100 MG tablet Take 1 tablet (100 mg total) by mouth once daily. 90 tablet 0    aspirin (ECOTRIN) 81 MG EC tablet Take 81 mg by mouth every morning.       BD INSULIN SYRINGE ULTRA-FINE 0.5 mL 31 gauge x 5/16 Syrg USE AS DIRECTED TO INJECT INSULIN TWO TIMES DAILY 60 each 11    bimatoprost (LUMIGAN) 0.03 % ophthalmic drops Place 1 drop into the left eye every evening. 2.5 mL 11    blood sugar diagnostic Strp 1 strip by Misc.(Non-Drug; Combo Route) route 4 (four) times daily. Telcare 120 strip 11    brimonidine-timolol (COMBIGAN) 0.2-0.5 % Drop Place 1 drop into both eyes every 12 (twelve) hours. 10 mL 6    bumetanide (BUMEX) 2 MG tablet TAKE 1 TABLET (2 MG TOTAL) BY MOUTH ONCE DAILY. 90 tablet 3    cholecalciferol, vitamin D3, (VITAMIN D3) 1,000 unit capsule Take 1,000 Units by mouth once daily.      dorzolamide-timolol 2-0.5% (COSOPT) 22.3-6.8 mg/mL ophthalmic solution Place 1 drop into both  "eyes 2 (two) times daily. 10 mL 2    ENTRESTO 24-26 mg per tablet TAKE 1 TABLET BY MOUTH TWICE A DAY 60 tablet 12    insulin glargine (LANTUS U-100 INSULIN) 100 unit/mL injection INJECT 50 UNITS TWICE DAILY AS NEEDED WHEN BS< 150 30 mL 11    IRON/VITAMIN B COMPLEX (GERITOL ORAL) Take by mouth.      ketoconazole (NIZORAL) 2 % cream AAA bid prn rash of groin. For Flares. 60 g 3    lancets Misc For tel care 120 each 11    latanoprost (XALATAN) 0.005 % ophthalmic solution Place 1 drop into both eyes once daily. 2.5 mL 4    multivit-min/folic/vit K/lycop (MEN'S 50 PLUS MULTIVITAMIN ORAL) Take by mouth.      pantoprazole (PROTONIX) 40 MG tablet TAKE 1 TABLET BY MOUTH EVERY DAY FOR ACID REFLUX 90 tablet 1    pen needle, diabetic (BD ULTRA-FINE JOSLYN PEN NEEDLE) 32 gauge x 5/32" Ndle 1 each by Misc.(Non-Drug; Combo Route) route 4 (four) times daily. 100 each 11    simvastatin (ZOCOR) 10 MG tablet Take 1 tablet (10 mg total) by mouth every evening. 90 tablet 4     No current facility-administered medications for this visit.        Family History   Problem Relation Age of Onset    Cancer Mother     Heart disease Father     Stroke Father     No Known Problems Sister     No Known Problems Brother     No Known Problems Maternal Aunt     No Known Problems Maternal Uncle     No Known Problems Paternal Aunt     No Known Problems Paternal Uncle     No Known Problems Maternal Grandmother     No Known Problems Maternal Grandfather     No Known Problems Paternal Grandmother     No Known Problems Paternal Grandfather     Amblyopia Neg Hx     Blindness Neg Hx     Cataracts Neg Hx     Diabetes Neg Hx     Glaucoma Neg Hx     Hypertension Neg Hx     Macular degeneration Neg Hx     Retinal detachment Neg Hx     Strabismus Neg Hx     Thyroid disease Neg Hx        Social History     Socioeconomic History    Marital status:      Spouse name: Not on file    Number of children: 2    Years of education: " Not on file    Highest education level: Not on file   Occupational History    Occupation: [employer]     Employer: [employer]   Social Needs    Financial resource strain: Not on file    Food insecurity:     Worry: Not on file     Inability: Not on file    Transportation needs:     Medical: Not on file     Non-medical: Not on file   Tobacco Use    Smoking status: Never Smoker    Smokeless tobacco: Never Used   Substance and Sexual Activity    Alcohol use: No     Alcohol/week: 0.0 standard drinks     Frequency: Never    Drug use: No    Sexual activity: Not Currently   Lifestyle    Physical activity:     Days per week: Not on file     Minutes per session: Not on file    Stress: Not on file   Relationships    Social connections:     Talks on phone: Not on file     Gets together: Not on file     Attends Tenriism service: Not on file     Active member of club or organization: Not on file     Attends meetings of clubs or organizations: Not on file     Relationship status: Not on file   Other Topics Concern    Not on file   Social History Narrative    . Lives with spouse. Has 2 children. 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       Review of Systems:  1. GENERAL: patient denies any fever, weight changes, generalized weakness, dizziness.  2. HEENT: patient denies headaches, visual disturbances, swallowing problems, sinus pain, nasal congestion.  3. CARDIOVASCULAR: patient denies chest pain, palpitations.  4. PULMONARY: patient reports SOB, no coughing, hemoptysis, wheezing.  5. GASTROINTESTINAL: patient denies abdominal pain, nausea, vomiting, diarrhea.  6. GENITOURINARY: patient denies dysuria, hematuria, hesitancy, frequency.  7. EXTREMITIES: patient reports LE edema, no LE cramping.  8. DERMATOLOGY: patient denies rashes, ulcers.  9. NEURO: patient denies tremors, extremity weakness, extremity numbness/tingling.  10. MUSCULOSKELETAL: patient denies joint  "pain, joint swelling.  11. HEMATOLOGY: patient denies rectal or gum bleeding.  12: PSYCH: patient denies anxiety, depression.      PHYSICAL EXAM:  BP (!) 140/90   Pulse 100   Resp 20   Ht 5' 8" (1.727 m)   Wt 105.7 kg (233 lb 0.4 oz)   BMI 35.43 kg/m²     GENERAL: Pleasant gentleman presents to clinic with non-labored breathing.  HEENT: PER, no nasal discharge, no icterus, no oral exudates, moist mucosal membranes.  NECK: no thyroid mass, no lymphadenopathy.  HEART: RRR S1/S2, no rubs, good peripheral pulses.  LUNGS: CTA bilaterally, no wheezing, breathing is nonlabored.  ABDOMEN: soft, nontender, not distended, bowel sounds are present, no abdominal hernia  EXTREM: pitting bilateral LE edema, + 3   SKIN: no rashes, skin is warm and dry.  NEURO: A & O x 3, no obvious focal signs.    LABORATORY RESULTS:    Lab Results   Component Value Date    CREATININE 3.0 (H) 05/14/2020    BUN 34 (H) 05/14/2020     (L) 05/14/2020    K 4.2 05/14/2020    CL 96 05/14/2020    CO2 31 (H) 05/14/2020      Lab Results   Component Value Date    PTH 88.0 (H) 10/08/2019    CALCIUM 8.7 05/14/2020    PHOS 2.5 (L) 05/08/2020     Lab Results   Component Value Date    ALBUMIN 2.9 (L) 05/14/2020     Lab Results   Component Value Date    WBC 4.35 05/05/2020    HGB 11.7 (L) 05/05/2020    HCT 39.5 (L) 05/05/2020    MCV 84 05/05/2020     (L) 05/05/2020     Protein Creatinine Ratios:    Creatinine, Random Ur   Date Value Ref Range Status   05/08/2020 35.0 23.0 - 375.0 mg/dL Final     Comment:     The random urine reference ranges provided were established   for 24 hour urine collections.  No reference ranges exist for  random urine specimens.  Correlate clinically.     11/27/2019 95.0 23.0 - 375.0 mg/dL Final     Comment:     The random urine reference ranges provided were established   for 24 hour urine collections.  No reference ranges exist for  random urine specimens.  Correlate clinically.     07/15/2019 123.0 23.0 - 375.0 mg/dL " Final     Comment:     The random urine reference ranges provided were established   for 24 hour urine collections.  No reference ranges exist for  random urine specimens.  Correlate clinically.       Protein, Urine Random   Date Value Ref Range Status   05/08/2020 55 (H) 0 - 15 mg/dL Final     Comment:     The random urine reference ranges provided were established   for 24 hour urine collections.  No reference ranges exist for  random urine specimens.  Correlate clinically.     11/27/2019 105 (H) 0 - 15 mg/dL Final     Comment:     The random urine reference ranges provided were established   for 24 hour urine collections.  No reference ranges exist for  random urine specimens.  Correlate clinically.     07/15/2019 8 0 - 15 mg/dL Final     Comment:     The random urine reference ranges provided were established   for 24 hour urine collections.  No reference ranges exist for  random urine specimens.  Correlate clinically.       Prot/Creat Ratio, Ur   Date Value Ref Range Status   05/08/2020 1.57 (H) 0.00 - 0.20 Final   11/27/2019 1.11 (H) 0.00 - 0.20 Final   07/15/2019 0.07 0.00 - 0.20 Final             ASSESSMENT AND PLAN:  65 y.o. male with history of CKD 3/4, HTN, CHF, anemia, DM2, CAD, pulmonary hypertension, CHF presents to the renal clinic for evaluation of renal insufficiency.     1. Renal insufficiency: Patient had recent episode of BROOKE with peak creatinine of 8.2 on 11/15/18. Renal function had improved to 2.4 (). He presents with creatinine if 3 today (labs from 5/14/20). Patient likely suffers from mild cardiorenal syndrome (EF 20-25%) and creatinine has been fluctuating (in addition to diabetic nephropathy). Will continue Entresto for proteinuria. Will refer patient to ER for evaluation/treatment.   Patient's renal function will be monitored closely and he will return to the clinic in 2 weeks for follow up. Patient is on fluid restrictions (about 40-50 ounces per day).     2. Electrolytes: at goal.      3. Acid base status:no issues.     4. Volume: Bilateral pitting LE edema that has been worsening. Associated with SOB. Will refer patient to ER.     5. Hypertension: acceptable BP control.     6. Medications: Reviewed.     7. DM2: well controlled with last HgA1c at 6 (10/8/19).     8. CAD/CHF: As per Cardiology (Dr. Calvillo).     9. Hyperparathyroidism: vitamin D OTC.

## 2020-05-15 NOTE — ED NOTES
"Pt c/o increasing SOB x1 week - pt states "I am on 4L at home" - pt O2 saturation 90-92 on RA - pt placed on 2L at this time. MD aware.    Patient moved to ED room 3 patient assisted onto stretcher and changed into a gown. Patient placed on cardiac monitor, continuous pulse oximetry and automatic blood pressure cuff. Bed placed in low locked position, side rails up x 2, call light is within reach of patient or family, orientation to room and explanation of wait provided to family and patient, alarms set and turned on for monitor and pulse ox, will continue to monitor.    Patient identifies self as Yan Choudhury      LOC: The patient is awake, alert and aware of environment with an appropriate affect, the patient is oriented x 3 and speaking appropriately.  APPEARANCE: Patient resting comfortably and in no acute distress, patient is clean and well groomed, patient's clothing is properly fastened.  SKIN: The skin is warm and dry, color consistent with ethnicity, patient has normal skin turgor and moist mucus membranes, skin intact, no breakdown or bruising noted.  MUSCULOSKELETAL: Patient moving all extremities well, no obvious swelling or deformities noted.  RESPIRATORY: Airway is open and patent, respirations are spontaneous, patient has increased effort and rate, some accessory muscle use noted.  CARDIAC: Patient has a normal rate and rhythm, 2+ peripheral edema noted to BLE, capillary refill < 3 seconds.  ABDOMEN: Soft and non tender to palpation, no distention noted.  NEUROLOGIC: PERRL, eyes open spontaneously, behavior appropriate to situation, follows commands, facial expression symmetrical, bilateral hand grasp equal and even, purposeful motor response noted, normal sensation in all extremities when touched with a finger.  "

## 2020-05-18 DIAGNOSIS — I50.9 ACUTE ON CHRONIC CONGESTIVE HEART FAILURE: ICD-10-CM

## 2020-05-18 RX ORDER — BUMETANIDE 2 MG/1
TABLET ORAL
Qty: 90 TABLET | Refills: 11 | Status: SHIPPED | OUTPATIENT
Start: 2020-05-18 | End: 2020-09-01 | Stop reason: SDUPTHER

## 2020-05-18 NOTE — TELEPHONE ENCOUNTER
----- Message from Liliam Christianson sent at 5/18/2020 11:55 AM CDT -----  Contact: PT's Wife   Called to speak to the doctor regarding the increased of his medication and how she needs a refill sent to the pharmacy. Please call back     Callback: [phone]

## 2020-05-18 NOTE — TELEPHONE ENCOUNTER
Spoke to pt's wife and she states this the pt take bumex 2mg tid. They are out of the medication and need a new prescription. Please advise.

## 2020-05-24 ENCOUNTER — TELEPHONE (OUTPATIENT)
Dept: CARDIOLOGY | Facility: HOSPITAL | Age: 65
End: 2020-05-24

## 2020-05-24 DIAGNOSIS — I50.42 CHRONIC COMBINED SYSTOLIC AND DIASTOLIC CONGESTIVE HEART FAILURE: Primary | ICD-10-CM

## 2020-05-24 NOTE — TELEPHONE ENCOUNTER
Please call pt and schedule f/u appt in 4 - 6 weeks with CMP, BNP 2 weeks before appt.    Dr Calvillo

## 2020-05-25 ENCOUNTER — TELEPHONE (OUTPATIENT)
Dept: NEPHROLOGY | Facility: CLINIC | Age: 65
End: 2020-05-25

## 2020-05-25 NOTE — TELEPHONE ENCOUNTER
Mrs Choudhury confirmed labs for 6/29/20 at [facility] and follow up with Jovana for 7/6/20 at [facility]

## 2020-05-25 NOTE — TELEPHONE ENCOUNTER
----- Message from Teresa Orellana sent at 5/25/2020 11:40 AM CDT -----  Contact: Patient's wife- Jessica  Please call patient's wife concerning his 06/02/2020 appointment in [place], wife would like  To know why patient is going to [place]. Please call at Ph .[phone] (home)

## 2020-05-28 ENCOUNTER — HOSPITAL ENCOUNTER (INPATIENT)
Facility: HOSPITAL | Age: 65
LOS: 4 days | Discharge: HOME | DRG: 291 | End: 2020-06-02
Attending: EMERGENCY MEDICINE | Admitting: INTERNAL MEDICINE
Payer: MEDICARE

## 2020-05-28 ENCOUNTER — TELEPHONE (OUTPATIENT)
Dept: INTERNAL MEDICINE | Facility: CLINIC | Age: 65
End: 2020-05-28

## 2020-05-28 DIAGNOSIS — N18.30 TYPE 2 DIABETES MELLITUS WITH STAGE 3 CHRONIC KIDNEY DISEASE, WITH LONG-TERM CURRENT USE OF INSULIN: ICD-10-CM

## 2020-05-28 DIAGNOSIS — N18.4 CKD (CHRONIC KIDNEY DISEASE) STAGE 4, GFR 15-29 ML/MIN: ICD-10-CM

## 2020-05-28 DIAGNOSIS — E11.22 TYPE 2 DIABETES MELLITUS WITH STAGE 3 CHRONIC KIDNEY DISEASE, WITH LONG-TERM CURRENT USE OF INSULIN: ICD-10-CM

## 2020-05-28 DIAGNOSIS — R60.0 BILATERAL LEG EDEMA: ICD-10-CM

## 2020-05-28 DIAGNOSIS — I50.23 ACUTE ON CHRONIC SYSTOLIC HEART FAILURE: ICD-10-CM

## 2020-05-28 DIAGNOSIS — Z79.4 TYPE 2 DIABETES MELLITUS WITH STAGE 3 CHRONIC KIDNEY DISEASE, WITH LONG-TERM CURRENT USE OF INSULIN: ICD-10-CM

## 2020-05-28 DIAGNOSIS — J96.21 ACUTE ON CHRONIC RESPIRATORY FAILURE WITH HYPOXIA: ICD-10-CM

## 2020-05-28 DIAGNOSIS — I50.33 ACUTE ON CHRONIC DIASTOLIC HEART FAILURE: Primary | ICD-10-CM

## 2020-05-28 DIAGNOSIS — I50.9 CHF (CONGESTIVE HEART FAILURE): ICD-10-CM

## 2020-05-28 DIAGNOSIS — R06.02 SHORTNESS OF BREATH: ICD-10-CM

## 2020-05-28 PROBLEM — R79.89 ELEVATED LFTS: Status: ACTIVE | Noted: 2020-05-28

## 2020-05-28 LAB
ALBUMIN SERPL BCP-MCNC: 3 G/DL (ref 3.5–5.2)
ALLENS TEST: ABNORMAL
ALP SERPL-CCNC: 91 U/L (ref 55–135)
ALT SERPL W/O P-5'-P-CCNC: 25 U/L (ref 10–44)
ANION GAP SERPL CALC-SCNC: 11 MMOL/L (ref 8–16)
APTT BLDCRRT: 32.5 SEC (ref 21–32)
AST SERPL-CCNC: 50 U/L (ref 10–40)
BASOPHILS # BLD AUTO: 0.04 K/UL (ref 0–0.2)
BASOPHILS NFR BLD: 0.7 % (ref 0–1.9)
BILIRUB SERPL-MCNC: 1.7 MG/DL (ref 0.1–1)
BNP SERPL-MCNC: 3059 PG/ML (ref 0–99)
BUN SERPL-MCNC: 34 MG/DL (ref 8–23)
CALCIUM SERPL-MCNC: 9.2 MG/DL (ref 8.7–10.5)
CHLORIDE SERPL-SCNC: 99 MMOL/L (ref 95–110)
CO2 SERPL-SCNC: 26 MMOL/L (ref 23–29)
CREAT SERPL-MCNC: 3 MG/DL (ref 0.5–1.4)
DELSYS: ABNORMAL
DIFFERENTIAL METHOD: ABNORMAL
EOSINOPHIL # BLD AUTO: 0 K/UL (ref 0–0.5)
EOSINOPHIL NFR BLD: 0.7 % (ref 0–8)
ERYTHROCYTE [DISTWIDTH] IN BLOOD BY AUTOMATED COUNT: 20.1 % (ref 11.5–14.5)
EST. GFR  (AFRICAN AMERICAN): 24 ML/MIN/1.73 M^2
EST. GFR  (NON AFRICAN AMERICAN): 21 ML/MIN/1.73 M^2
FIO2: 21
GLUCOSE SERPL-MCNC: 111 MG/DL (ref 70–110)
HCO3 UR-SCNC: 27.9 MMOL/L (ref 24–28)
HCT VFR BLD AUTO: 41.4 % (ref 40–54)
HGB BLD-MCNC: 12.8 G/DL (ref 14–18)
IMM GRANULOCYTES # BLD AUTO: 0.02 K/UL (ref 0–0.04)
IMM GRANULOCYTES NFR BLD AUTO: 0.3 % (ref 0–0.5)
INR PPP: 1.6 (ref 0.8–1.2)
LYMPHOCYTES # BLD AUTO: 1.6 K/UL (ref 1–4.8)
LYMPHOCYTES NFR BLD: 28 % (ref 18–48)
MAGNESIUM SERPL-MCNC: 1.8 MG/DL (ref 1.6–2.6)
MCH RBC QN AUTO: 24.4 PG (ref 27–31)
MCHC RBC AUTO-ENTMCNC: 30.9 G/DL (ref 32–36)
MCV RBC AUTO: 79 FL (ref 82–98)
MODE: ABNORMAL
MONOCYTES # BLD AUTO: 0.8 K/UL (ref 0.3–1)
MONOCYTES NFR BLD: 14.1 % (ref 4–15)
NEUTROPHILS # BLD AUTO: 3.2 K/UL (ref 1.8–7.7)
NEUTROPHILS NFR BLD: 56.2 % (ref 38–73)
NRBC BLD-RTO: 0 /100 WBC
PCO2 BLDA: 46.7 MMHG (ref 35–45)
PH SMN: 7.38 [PH] (ref 7.35–7.45)
PLATELET # BLD AUTO: 143 K/UL (ref 150–350)
PMV BLD AUTO: ABNORMAL FL (ref 9.2–12.9)
PO2 BLDA: 53 MMHG (ref 80–100)
POC BE: 3 MMOL/L
POC SATURATED O2: 86 % (ref 95–100)
POCT GLUCOSE: 123 MG/DL (ref 70–110)
POTASSIUM SERPL-SCNC: 4 MMOL/L (ref 3.5–5.1)
PROT SERPL-MCNC: 6.9 G/DL (ref 6–8.4)
PROTHROMBIN TIME: 16.2 SEC (ref 9–12.5)
RBC # BLD AUTO: 5.25 M/UL (ref 4.6–6.2)
SAMPLE: ABNORMAL
SARS-COV-2 RDRP RESP QL NAA+PROBE: NEGATIVE
SITE: ABNORMAL
SODIUM SERPL-SCNC: 136 MMOL/L (ref 136–145)
TROPONIN I SERPL DL<=0.01 NG/ML-MCNC: 0.06 NG/ML (ref 0–0.03)
TSH SERPL DL<=0.005 MIU/L-ACNC: 2.86 UIU/ML (ref 0.4–4)
WBC # BLD AUTO: 5.74 K/UL (ref 3.9–12.7)

## 2020-05-28 PROCEDURE — 93010 EKG 12-LEAD: ICD-10-PCS | Mod: ,,, | Performed by: INTERNAL MEDICINE

## 2020-05-28 PROCEDURE — U0002 COVID-19 LAB TEST NON-CDC: HCPCS

## 2020-05-28 PROCEDURE — 84443 ASSAY THYROID STIM HORMONE: CPT

## 2020-05-28 PROCEDURE — G0378 HOSPITAL OBSERVATION PER HR: HCPCS

## 2020-05-28 PROCEDURE — 80053 COMPREHEN METABOLIC PANEL: CPT

## 2020-05-28 PROCEDURE — 83735 ASSAY OF MAGNESIUM: CPT

## 2020-05-28 PROCEDURE — 36600 WITHDRAWAL OF ARTERIAL BLOOD: CPT

## 2020-05-28 PROCEDURE — 83880 ASSAY OF NATRIURETIC PEPTIDE: CPT

## 2020-05-28 PROCEDURE — 96374 THER/PROPH/DIAG INJ IV PUSH: CPT

## 2020-05-28 PROCEDURE — 84484 ASSAY OF TROPONIN QUANT: CPT

## 2020-05-28 PROCEDURE — 83036 HEMOGLOBIN GLYCOSYLATED A1C: CPT

## 2020-05-28 PROCEDURE — 63600175 PHARM REV CODE 636 W HCPCS: Performed by: INTERNAL MEDICINE

## 2020-05-28 PROCEDURE — 82803 BLOOD GASES ANY COMBINATION: CPT

## 2020-05-28 PROCEDURE — 99900035 HC TECH TIME PER 15 MIN (STAT)

## 2020-05-28 PROCEDURE — 85610 PROTHROMBIN TIME: CPT

## 2020-05-28 PROCEDURE — 93010 ELECTROCARDIOGRAM REPORT: CPT | Mod: ,,, | Performed by: INTERNAL MEDICINE

## 2020-05-28 PROCEDURE — 27000221 HC OXYGEN, UP TO 24 HOURS

## 2020-05-28 PROCEDURE — 85730 THROMBOPLASTIN TIME PARTIAL: CPT

## 2020-05-28 PROCEDURE — S0171 BUMETANIDE 0.5 MG: HCPCS | Performed by: EMERGENCY MEDICINE

## 2020-05-28 PROCEDURE — 25000003 PHARM REV CODE 250: Performed by: INTERNAL MEDICINE

## 2020-05-28 PROCEDURE — 96376 TX/PRO/DX INJ SAME DRUG ADON: CPT

## 2020-05-28 PROCEDURE — 85025 COMPLETE CBC W/AUTO DIFF WBC: CPT

## 2020-05-28 PROCEDURE — 99291 CRITICAL CARE FIRST HOUR: CPT | Mod: 25

## 2020-05-28 PROCEDURE — 96372 THER/PROPH/DIAG INJ SC/IM: CPT

## 2020-05-28 PROCEDURE — 93005 ELECTROCARDIOGRAM TRACING: CPT

## 2020-05-28 PROCEDURE — 25000003 PHARM REV CODE 250: Performed by: EMERGENCY MEDICINE

## 2020-05-28 PROCEDURE — S0171 BUMETANIDE 0.5 MG: HCPCS | Performed by: INTERNAL MEDICINE

## 2020-05-28 RX ORDER — ONDANSETRON 2 MG/ML
4 INJECTION INTRAMUSCULAR; INTRAVENOUS EVERY 8 HOURS PRN
Status: DISCONTINUED | OUTPATIENT
Start: 2020-05-28 | End: 2020-06-02 | Stop reason: HOSPADM

## 2020-05-28 RX ORDER — DORZOLAMIDE HYDROCHLORIDE AND TIMOLOL MALEATE 20; 5 MG/ML; MG/ML
1 SOLUTION/ DROPS OPHTHALMIC 2 TIMES DAILY
Status: DISCONTINUED | OUTPATIENT
Start: 2020-05-28 | End: 2020-05-28

## 2020-05-28 RX ORDER — GLUCAGON 1 MG
1 KIT INJECTION
Status: DISCONTINUED | OUTPATIENT
Start: 2020-05-28 | End: 2020-06-02 | Stop reason: HOSPADM

## 2020-05-28 RX ORDER — BUMETANIDE 0.25 MG/ML
2 INJECTION INTRAMUSCULAR; INTRAVENOUS
Status: COMPLETED | OUTPATIENT
Start: 2020-05-28 | End: 2020-05-28

## 2020-05-28 RX ORDER — TIMOLOL MALEATE 5 MG/ML
1 SOLUTION/ DROPS OPHTHALMIC 2 TIMES DAILY
Status: DISCONTINUED | OUTPATIENT
Start: 2020-05-28 | End: 2020-06-02 | Stop reason: HOSPADM

## 2020-05-28 RX ORDER — CARVEDILOL 3.12 MG/1
3.12 TABLET ORAL 2 TIMES DAILY WITH MEALS
Status: DISCONTINUED | OUTPATIENT
Start: 2020-05-28 | End: 2020-06-02 | Stop reason: HOSPADM

## 2020-05-28 RX ORDER — LATANOPROST 50 UG/ML
1 SOLUTION/ DROPS OPHTHALMIC NIGHTLY
Status: DISCONTINUED | OUTPATIENT
Start: 2020-05-28 | End: 2020-06-02 | Stop reason: HOSPADM

## 2020-05-28 RX ORDER — BRIMONIDINE TARTRATE AND TIMOLOL MALEATE 2; 5 MG/ML; MG/ML
1 SOLUTION OPHTHALMIC EVERY 12 HOURS
Status: DISCONTINUED | OUTPATIENT
Start: 2020-05-28 | End: 2020-05-28

## 2020-05-28 RX ORDER — PANTOPRAZOLE SODIUM 40 MG/1
40 TABLET, DELAYED RELEASE ORAL DAILY
Status: DISCONTINUED | OUTPATIENT
Start: 2020-05-29 | End: 2020-06-02 | Stop reason: HOSPADM

## 2020-05-28 RX ORDER — IBUPROFEN 200 MG
16 TABLET ORAL
Status: DISCONTINUED | OUTPATIENT
Start: 2020-05-28 | End: 2020-06-02 | Stop reason: HOSPADM

## 2020-05-28 RX ORDER — SODIUM CHLORIDE 0.9 % (FLUSH) 0.9 %
10 SYRINGE (ML) INJECTION
Status: DISCONTINUED | OUTPATIENT
Start: 2020-05-28 | End: 2020-06-02 | Stop reason: HOSPADM

## 2020-05-28 RX ORDER — ALLOPURINOL 100 MG/1
100 TABLET ORAL DAILY
Status: DISCONTINUED | OUTPATIENT
Start: 2020-05-29 | End: 2020-06-02 | Stop reason: HOSPADM

## 2020-05-28 RX ORDER — INSULIN ASPART 100 [IU]/ML
0-5 INJECTION, SOLUTION INTRAVENOUS; SUBCUTANEOUS
Status: DISCONTINUED | OUTPATIENT
Start: 2020-05-28 | End: 2020-06-02 | Stop reason: HOSPADM

## 2020-05-28 RX ORDER — SIMVASTATIN 5 MG/1
10 TABLET, FILM COATED ORAL NIGHTLY
Status: DISCONTINUED | OUTPATIENT
Start: 2020-05-29 | End: 2020-06-02 | Stop reason: HOSPADM

## 2020-05-28 RX ORDER — ASPIRIN 81 MG/1
81 TABLET ORAL EVERY MORNING
Status: DISCONTINUED | OUTPATIENT
Start: 2020-05-29 | End: 2020-06-02 | Stop reason: HOSPADM

## 2020-05-28 RX ORDER — ENOXAPARIN SODIUM 100 MG/ML
30 INJECTION SUBCUTANEOUS EVERY 24 HOURS
Status: DISCONTINUED | OUTPATIENT
Start: 2020-05-28 | End: 2020-06-02 | Stop reason: HOSPADM

## 2020-05-28 RX ORDER — CARVEDILOL 3.12 MG/1
3.12 TABLET ORAL 2 TIMES DAILY WITH MEALS
Status: DISCONTINUED | OUTPATIENT
Start: 2020-05-28 | End: 2020-05-28

## 2020-05-28 RX ORDER — POLYETHYLENE GLYCOL 3350 17 G/17G
17 POWDER, FOR SOLUTION ORAL DAILY
Status: DISCONTINUED | OUTPATIENT
Start: 2020-05-29 | End: 2020-05-30

## 2020-05-28 RX ORDER — IBUPROFEN 200 MG
24 TABLET ORAL
Status: DISCONTINUED | OUTPATIENT
Start: 2020-05-28 | End: 2020-06-02 | Stop reason: HOSPADM

## 2020-05-28 RX ORDER — BUMETANIDE 0.25 MG/ML
3 INJECTION INTRAMUSCULAR; INTRAVENOUS 2 TIMES DAILY
Status: DISCONTINUED | OUTPATIENT
Start: 2020-05-28 | End: 2020-06-02 | Stop reason: HOSPADM

## 2020-05-28 RX ORDER — BRIMONIDINE TARTRATE 1.5 MG/ML
1 SOLUTION/ DROPS OPHTHALMIC 2 TIMES DAILY
Status: DISCONTINUED | OUTPATIENT
Start: 2020-05-28 | End: 2020-06-02 | Stop reason: HOSPADM

## 2020-05-28 RX ADMIN — TIMOLOL MALEATE 1 DROP: 5 SOLUTION OPHTHALMIC at 10:05

## 2020-05-28 RX ADMIN — ENOXAPARIN SODIUM 30 MG: 100 INJECTION SUBCUTANEOUS at 06:05

## 2020-05-28 RX ADMIN — CARVEDILOL 3.12 MG: 3.12 TABLET, FILM COATED ORAL at 10:05

## 2020-05-28 RX ADMIN — BRIMONIDINE TARTRATE 1 DROP: 1.5 SOLUTION OPHTHALMIC at 10:05

## 2020-05-28 RX ADMIN — LATANOPROST 1 DROP: 50 SOLUTION OPHTHALMIC at 10:05

## 2020-05-28 RX ADMIN — SACUBITRIL AND VALSARTAN 1 TABLET: 24; 26 TABLET, FILM COATED ORAL at 10:05

## 2020-05-28 RX ADMIN — BUMETANIDE 2 MG: 0.25 INJECTION INTRAMUSCULAR; INTRAVENOUS at 04:05

## 2020-05-28 RX ADMIN — BUMETANIDE 3 MG: 0.25 INJECTION INTRAMUSCULAR; INTRAVENOUS at 10:05

## 2020-05-28 NOTE — HPI
Yan Choudhury is a 65 y.o. male patient with a PMHx of type 2 DM, HLD, HTN, obesity, CAD, ischemic cardiomyopathy, diverticulitis of colon, CKD Stage 3,  CHF, PPM, CAD, and JAHAIRA on CPAP and PSHx of cardiac catheterization who presents to the Emergency Department for evaluation of SOB which onset gradually x2 weeks ago . Pt states he has gained 10 lbs in x1 week. Symptoms are worsening and moderate in severity. No mitigating or exacerbating factors reported. Associated sx includes BLE edema. Patient denies any fever, chills, numbness, weakness, CP, smoking, cough, palpitations, and all other sxs at this time. Pt states he is compliant with his fluid pill. No further complaints or concerns at this time.  Pt reports compliance with prescribed medications.  Pt reports increased sodium consumption (crawfish) and denies increased fluid intake.  Pt denies smoking and use of ETOH.  Pt is followed outpatient by Dr. Estevez (PCP) and Dr. Calvillo (Cardiology).  Pt is a full code and Elsie Choudhury (wife) is the surrogate decision maker.  ER work up showed: INR 1.6, BUN/Creatinine 34/3, Glucose 111, T. Bili 1.7, AST 50, BNP 3059, Troponin 0.061, , and pO2 53.  Chest xray showed no acute process.  EKG showed SR with fusion complexes, L axis deviation, and nonspecific intraventricular block with HR 98.  Hospital Medicine contacted for admission with patient placed in Observation Unit for further evaluation.

## 2020-05-28 NOTE — TELEPHONE ENCOUNTER
----- Message from Bobby Ma sent at 5/28/2020  8:40 AM CDT -----  Contact: pt wife  Type:  Sooner Apoointment Request    Caller is requesting a sooner appointment.  Caller declined first available appointment listed below.  Caller will not accept being placed on the waitlist and is requesting a message be sent to doctor.  Name of Caller:Pt wife  When is the first available appointment?06/01/2020  Symptoms:ER f/u  Would the patient rather a call back or a response [facility]? Call back   Best Call Back Number:[phone] (home)   Additional Information: caller stated that the patients feet are still swollen and she feels that he needs to be seen as soon as possible

## 2020-05-28 NOTE — ED PROVIDER NOTES
SCRIBE #1 NOTE: I, Sophia Mtz, am scribing for, and in the presence of, Nena Long MD. I have scribed the entire note.       History     Chief Complaint   Patient presents with    Shortness of Breath     pt reports SOB x 2 weeks that is worsening; SOB worse with exertion, denies CP     Review of patient's allergies indicates:  No Known Allergies      History of Present Illness     HPI    5/28/2020, 3:54 PM  History obtained from the patient      History of Present Illness: Yan Choudhury is a 65 y.o. male patient with a PMHx of type 2 DM, HLD, HTN, obesity, CAD, ischemic cardiomyopathy, diverticulitis of colon, CKF,  CHF, and JAHAIRA on CPAP and PSHx of cardiac catheterization who presents to the Emergency Department for evaluation of SOB which onset gradually x2 weeks ago. Pt states he has gained 10 lbs in x1 week. Symptoms are worsening and moderate in severity. No mitigating or exacerbating factors reported. Associated sx includes BLE edema. Patient denies any fever, chills, numbness, weakness, CP, smoking, cough, palpitations, and all other sxs at this time. Pt states he is compliant with his fluid pill. No further complaints or concerns at this time.       Arrival mode: Personal vehicle      PCP: Sandra Estevez MD        Past Medical History:  Past Medical History:   Diagnosis Date    Arthritis     CAD (coronary artery disease)     Cataract     Chronic combined systolic and diastolic congestive heart failure 3/24/2015    CKD (chronic kidney disease), stage III     Diabetes mellitus type II      AM    Diabetic retinopathy     anti Veg F injections for macular edema    Diverticulitis of colon     ED (erectile dysfunction)     Encounter for blood transfusion     Glaucoma     HTN (hypertension)     Hyperlipidemia     Ischemic cardiomyopathy     Obesity     JAHAIRA on CPAP     Pacemaker     Pulmonary HTN        Past Surgical History:  Past Surgical History:   Procedure Laterality Date     CARDIAC CATHETERIZATION  2009    CHOLECYSTECTOMY      COLON SURGERY      Sigmoid resection    COLONOSCOPY N/A 10/11/2018    Procedure: COLONOSCOPY;  Surgeon: Quinn Aragon MD;  Location: [facility] ENDO;  Service: General;  Laterality: N/A;    EYE SURGERY      FLEXIBLE SIGMOIDOSCOPY N/A 1/18/2019    Procedure: SIGMOIDOSCOPY, FLEXIBLE;  Surgeon: Quinn Aragon MD;  Location: [facility] ENDO;  Service: General;  Laterality: N/A;    ILEOSTOMY N/A 10/30/2018    Procedure: CREATION, ILEOSTOMY;  Surgeon: Kevin Lindsay MD;  Location: [facility] OR;  Service: General;  Laterality: N/A;    Ileostomy reversal  01/2019    KNEE SURGERY      MOBILIZATION OF SPLENIC FLEXURE N/A 10/30/2018    Procedure: MOBILIZATION, SPLENIC FLEXURE;  Surgeon: Kevin Lindsay MD;  Location: [facility] OR;  Service: General;  Laterality: N/A;    REVISION COLOSTOMY N/A 10/30/2018    Procedure: REVISION OR CLOSURE, COLOSTOMY;  Surgeon: Kevin Lindsay MD;  Location: [facility] OR;  Service: General;  Laterality: N/A;  Parastomal Hernia Repair    RIGHT HEMICOLECTOMY Right 10/30/2018    Procedure: HEMICOLECTOMY, RIGHT;  Surgeon: Kevin Lindsay MD;  Location: [facility] OR;  Service: General;  Laterality: Right;         Family History:  Family History   Problem Relation Age of Onset    Cancer Mother     Heart disease Father     Stroke Father     No Known Problems Sister     No Known Problems Brother     No Known Problems Maternal Aunt     No Known Problems Maternal Uncle     No Known Problems Paternal Aunt     No Known Problems Paternal Uncle     No Known Problems Maternal Grandmother     No Known Problems Maternal Grandfather     No Known Problems Paternal Grandmother     No Known Problems Paternal Grandfather     Amblyopia Neg Hx     Blindness Neg Hx     Cataracts Neg Hx     Diabetes Neg Hx     Glaucoma Neg Hx     Hypertension Neg Hx     Macular degeneration Neg Hx     Retinal detachment Neg Hx     Strabismus Neg Hx     Thyroid  disease Neg Hx        Social History:  Social History     Tobacco Use    Smoking status: Never Smoker    Smokeless tobacco: Never Used   Substance and Sexual Activity    Alcohol use: No     Alcohol/week: 0.0 standard drinks     Frequency: Never    Drug use: No    Sexual activity: Not Currently        Review of Systems     Review of Systems   Constitutional: Positive for unexpected weight change (10 lbs in x1 week). Negative for chills and fever.        (-) smoking     HENT: Negative for sore throat.    Respiratory: Positive for shortness of breath. Negative for cough.    Cardiovascular: Positive for leg swelling (BLE). Negative for chest pain and palpitations.   Gastrointestinal: Negative for nausea.   Genitourinary: Negative for dysuria.   Musculoskeletal: Negative for back pain.   Skin: Negative for rash.   Neurological: Negative for weakness and numbness.   Hematological: Does not bruise/bleed easily.   All other systems reviewed and are negative.     Physical Exam     Initial Vitals [05/28/20 1525]   BP Pulse Resp Temp SpO2   (!) 151/98 96 (!) 24 97.7 °F (36.5 °C) 98 %      MAP       --          Physical Exam  Nursing Notes and Vital Signs Reviewed.  Constitutional: Patient is in no acute distress. Chronically ill appearing.   Head: Atraumatic. Normocephalic.  Eyes: PERRL. EOM intact. Conjunctivae are not pale. No scleral icterus.  ENT: Mucous membranes are moist. Oropharynx is clear and symmetric.    Neck: Supple. Full ROM. No lymphadenopathy.  Cardiovascular: Regular rate. Regular rhythm. No murmurs, rubs, or gallops. Distal pulses are 2+ and symmetric. 3+ edema in bilat legs.   Pulmonary/Chest: Tachypneic. Diminished breath sounds bilat. No wheezing or rales.  Abdominal: Soft and non-distended.  There is no tenderness.  No rebound, guarding, or rigidity. Good bowel sounds. Reducible umbilical hernia.   Genitourinary: No CVA tenderness  Musculoskeletal: Moves all extremities. No obvious deformities. No  calf tenderness.  Skin: Warm and dry.  Neurological:  Alert, awake, and appropriate.  Normal speech.  No acute focal neurological deficits are appreciated.  Psychiatric: Normal affect. Good eye contact. Appropriate in content.     ED Course   Critical Care  Date/Time: 5/28/2020 5:02 PM  Performed by: Nena Long MD  Authorized by: Nena Long MD   Direct patient critical care time: 13 minutes  Additional history critical care time: 10 minutes  Ordering / reviewing critical care time: 13 minutes  Documentation critical care time: 7 minutes  Consulting other physicians critical care time: 2 minutes  Total critical care time (exclusive of procedural time) : 45 minutes  Critical care time was exclusive of separately billable procedures and treating other patients and teaching time.  Critical care was necessary to treat or prevent imminent or life-threatening deterioration of the following conditions: acute on chronic heart failure, hypoxemic respiratory failure.  Critical care was time spent personally by me on the following activities: blood draw for specimens, development of treatment plan with patient or surrogate, discussions with consultants, interpretation of cardiac output measurements, evaluation of patient's response to treatment, examination of patient, obtaining history from patient or surrogate, ordering and performing treatments and interventions, ordering and review of laboratory studies, re-evaluation of patient's condition, review of old charts, pulse oximetry and ordering and review of radiographic studies.        ED Vital Signs:  Vitals:    05/28/20 1525 05/28/20 1541 05/28/20 1602 05/28/20 1603   BP: (!) 151/98  (!) 147/93    Pulse: 96 98 96    Resp: (!) 24   14   Temp: 97.7 °F (36.5 °C)      TempSrc: Oral      SpO2: 98%  96%    Weight:   106.5 kg (234 lb 11.2 oz)     05/28/20 1619 05/28/20 1630 05/28/20 1702 05/28/20 1703   BP:   134/81    Pulse: 94 90 91 91   Resp: 20 18 15 18    Temp:       TempSrc:       SpO2: 97% 96%  97%   Weight:        05/28/20 1732 05/28/20 1737 05/28/20 1740 05/28/20 1744   BP:       Pulse: 103  101 95   Resp:       Temp:  98.3 °F (36.8 °C)     TempSrc:  Oral     SpO2: 98%  100% 99%   Weight:        05/28/20 1804   BP: 125/84   Pulse: 95   Resp:    Temp:    TempSrc:    SpO2: 100%   Weight:        Abnormal Lab Results:  Labs Reviewed   CBC W/ AUTO DIFFERENTIAL - Abnormal; Notable for the following components:       Result Value    Hemoglobin 12.8 (*)     Mean Corpuscular Volume 79 (*)     Mean Corpuscular Hemoglobin 24.4 (*)     Mean Corpuscular Hemoglobin Conc 30.9 (*)     RDW 20.1 (*)     Platelets 143 (*)     All other components within normal limits   COMPREHENSIVE METABOLIC PANEL - Abnormal; Notable for the following components:    Glucose 111 (*)     BUN, Bld 34 (*)     Creatinine 3.0 (*)     Albumin 3.0 (*)     Total Bilirubin 1.7 (*)     AST 50 (*)     eGFR if  24 (*)     eGFR if non  21 (*)     All other components within normal limits   TROPONIN I - Abnormal; Notable for the following components:    Troponin I 0.061 (*)     All other components within normal limits   B-TYPE NATRIURETIC PEPTIDE - Abnormal; Notable for the following components:    BNP 3,059 (*)     All other components within normal limits   PROTIME-INR - Abnormal; Notable for the following components:    Prothrombin Time 16.2 (*)     INR 1.6 (*)     All other components within normal limits   APTT - Abnormal; Notable for the following components:    aPTT 32.5 (*)     All other components within normal limits   ISTAT PROCEDURE - Abnormal; Notable for the following components:    POC PCO2 46.7 (*)     POC PO2 53 (*)     POC SATURATED O2 86 (*)     All other components within normal limits   SARS-COV-2 RNA AMPLIFICATION, QUAL   HEMOGLOBIN A1C   HEMOGLOBIN A1C   MAGNESIUM   TSH   POCT GLUCOSE MONITORING CONTINUOUS        All Lab Results:  Results for orders  placed or performed during the hospital encounter of 05/28/20   CBC auto differential   Result Value Ref Range    WBC 5.74 3.90 - 12.70 K/uL    RBC 5.25 4.60 - 6.20 M/uL    Hemoglobin 12.8 (L) 14.0 - 18.0 g/dL    Hematocrit 41.4 40.0 - 54.0 %    Mean Corpuscular Volume 79 (L) 82 - 98 fL    Mean Corpuscular Hemoglobin 24.4 (L) 27.0 - 31.0 pg    Mean Corpuscular Hemoglobin Conc 30.9 (L) 32.0 - 36.0 g/dL    RDW 20.1 (H) 11.5 - 14.5 %    Platelets 143 (L) 150 - 350 K/uL    MPV SEE COMMENT 9.2 - 12.9 fL    Immature Granulocytes 0.3 0.0 - 0.5 %    Gran # (ANC) 3.2 1.8 - 7.7 K/uL    Immature Grans (Abs) 0.02 0.00 - 0.04 K/uL    Lymph # 1.6 1.0 - 4.8 K/uL    Mono # 0.8 0.3 - 1.0 K/uL    Eos # 0.0 0.0 - 0.5 K/uL    Baso # 0.04 0.00 - 0.20 K/uL    nRBC 0 0 /100 WBC    Gran% 56.2 38.0 - 73.0 %    Lymph% 28.0 18.0 - 48.0 %    Mono% 14.1 4.0 - 15.0 %    Eosinophil% 0.7 0.0 - 8.0 %    Basophil% 0.7 0.0 - 1.9 %    Differential Method Automated    Comprehensive metabolic panel   Result Value Ref Range    Sodium 136 136 - 145 mmol/L    Potassium 4.0 3.5 - 5.1 mmol/L    Chloride 99 95 - 110 mmol/L    CO2 26 23 - 29 mmol/L    Glucose 111 (H) 70 - 110 mg/dL    BUN, Bld 34 (H) 8 - 23 mg/dL    Creatinine 3.0 (H) 0.5 - 1.4 mg/dL    Calcium 9.2 8.7 - 10.5 mg/dL    Total Protein 6.9 6.0 - 8.4 g/dL    Albumin 3.0 (L) 3.5 - 5.2 g/dL    Total Bilirubin 1.7 (H) 0.1 - 1.0 mg/dL    Alkaline Phosphatase 91 55 - 135 U/L    AST 50 (H) 10 - 40 U/L    ALT 25 10 - 44 U/L    Anion Gap 11 8 - 16 mmol/L    eGFR if African American 24 (A) >60 mL/min/1.73 m^2    eGFR if non African American 21 (A) >60 mL/min/1.73 m^2   Troponin I #1   Result Value Ref Range    Troponin I 0.061 (H) 0.000 - 0.026 ng/mL   B-Type natriuretic peptide (BNP)   Result Value Ref Range    BNP 3,059 (H) 0 - 99 pg/mL   Protime-INR   Result Value Ref Range    Prothrombin Time 16.2 (H) 9.0 - 12.5 sec    INR 1.6 (H) 0.8 - 1.2   APTT   Result Value Ref Range    aPTT 32.5 (H) 21.0 - 32.0  sec   COVID-19 Rapid Screening   Result Value Ref Range    SARS-CoV-2 RNA, Amplification, Qual Negative Negative   ISTAT PROCEDURE   Result Value Ref Range    POC PH 7.384 7.35 - 7.45    POC PCO2 46.7 (H) 35 - 45 mmHg    POC PO2 53 (LL) 80 - 100 mmHg    POC HCO3 27.9 24 - 28 mmol/L    POC BE 3 -2 to 2 mmol/L    POC SATURATED O2 86 (L) 95 - 100 %    Sample ARTERIAL     Site RR     Allens Test Pass     DelSys Room Air     Mode SPONT     FiO2 21      *Note: Due to a large number of results and/or encounters for the requested time period, some results have not been displayed. A complete set of results can be found in Results Review.       Imaging Results:  Imaging Results          X-Ray Chest AP Portable (Final result)  Result time 05/28/20 16:00:55    Final result by Asif Isaacs MD (05/28/20 16:00:55)                 Impression:      No acute radiographic abnormality in the chest.      Electronically signed by: Asif Isaacs  Date:    05/28/2020  Time:    16:00             Narrative:    EXAMINATION:  XR CHEST AP PORTABLE    CLINICAL HISTORY:  shortness of breath;    TECHNIQUE:  Single frontal view of the chest was performed.    COMPARISON:  Chest radiograph 05/15/2020 with priors    FINDINGS:  Left-sided AICD with transvenous leads in stable position.  Cardiomediastinal silhouette is unchanged and enlarged.  Aortic atherosclerotic calcifications.  Lungs are symmetrically expanded.  No acute or new airspace consolidative opacity.  No large effusion.  No pneumothorax.  Visualized osseous structures appear intact.                                 The EKG was ordered, reviewed, and independently interpreted by the ED provider.  Interpretation time: 1541  Rate: 98 BPM  Rhythm: Sinus rhythm with fusion complexes   Interpretation: LAD. Nonspecific intraventricular block. Inferior infarct, age undetermined. No STEMI.         The Emergency Provider reviewed the vital signs and test results, which are outlined above.      ED Discussion       5:22 PM: Re-evaluated pt. I have discussed test results, shared treatment plan, and the need for admission with patient and family at bedside. Pt and family express understanding at this time and agree with all information. All questions answered. Pt and family have no further questions or concerns at this time. Pt is ready for admit.    5:22 PM: Discussed case with Saurav Iraheta NP ([facility]). Dr. Garcia agrees with current care and management of pt and accepts admission.   Admitting Service: Hospital Medicine  Admitting Physician: Dr. Garcia  Admit to: Obs/tele         Medical Decision Making:   Clinical Tests:   Lab Tests: Reviewed and Ordered  Radiological Study: Reviewed and Ordered  Medical Tests: Reviewed and Ordered           ED Medication(s):  Medications   allopurinoL tablet 100 mg (has no administration in time range)   aspirin EC tablet 81 mg (has no administration in time range)   bimatoprost 0.01 % ophthalmic drops 1 drop (has no administration in time range)   dorzolamide-timolol 2-0.5% ophthalmic solution 1 drop (has no administration in time range)   latanoprost 0.005 % ophthalmic solution 1 drop (has no administration in time range)   pantoprazole EC tablet 40 mg (has no administration in time range)   simvastatin tablet 10 mg (has no administration in time range)   bumetanide injection 3 mg (has no administration in time range)   enoxaparin injection 30 mg (30 mg Subcutaneous Given 5/28/20 1804)   glucose chewable tablet 16 g (has no administration in time range)   glucose chewable tablet 24 g (has no administration in time range)   dextrose 10% (D10W) Bolus (has no administration in time range)   dextrose 10% (D10W) Bolus (has no administration in time range)   glucagon (human recombinant) injection 1 mg (has no administration in time range)   insulin aspart U-100 pen 0-5 Units (has no administration in time range)   insulin detemir U-100 pen 20 Units (has no  administration in time range)   sodium chloride 0.9% flush 10 mL (has no administration in time range)   carvediloL tablet 3.125 mg (has no administration in time range)   sacubitriL-valsartan 24-26 mg per tablet 1 tablet (has no administration in time range)   polyethylene glycol packet 17 g (has no administration in time range)   ondansetron injection 4 mg (has no administration in time range)   bumetanide injection 2 mg (2 mg Intravenous Given 5/28/20 1620)       New Prescriptions    No medications on file               Scribe Attestation:   Scribe #1: I performed the above scribed service and the documentation accurately describes the services I performed. I attest to the accuracy of the note.     Attending:   Physician Attestation Statement for Scribe #1: I, Nena Long MD, personally performed the services described in this documentation, as scribed by Sophia Mtz, in my presence, and it is both accurate and complete.           Clinical Impression       ICD-10-CM ICD-9-CM   1. Acute on chronic diastolic heart failure I50.33 428.33   2. Shortness of breath R06.02 786.05   3. Bilateral leg edema R60.0 782.3   4. Acute on chronic respiratory failure with hypoxia J96.21 518.84     799.02   5. CKD (chronic kidney disease) stage 4, GFR 15-29 ml/min N18.4 585.4   6. CHF (congestive heart failure) I50.9 428.0       Disposition:   Disposition: Placed in Observation  Condition: Ananth Long MD  05/28/20 5634

## 2020-05-28 NOTE — SUBJECTIVE & OBJECTIVE
Past Medical History:   Diagnosis Date    Arthritis     CAD (coronary artery disease)     Cataract     Chronic combined systolic and diastolic congestive heart failure 3/24/2015    CKD (chronic kidney disease), stage III     Diabetes mellitus type II      AM    Diabetic retinopathy     anti Veg F injections for macular edema    Diverticulitis of colon     ED (erectile dysfunction)     Encounter for blood transfusion     Glaucoma     HTN (hypertension)     Hyperlipidemia     Ischemic cardiomyopathy     Obesity     JAHAIRA on CPAP     Pacemaker     Pulmonary HTN        Past Surgical History:   Procedure Laterality Date    CARDIAC CATHETERIZATION  2009    CHOLECYSTECTOMY      COLON SURGERY      Sigmoid resection    COLONOSCOPY N/A 10/11/2018    Procedure: COLONOSCOPY;  Surgeon: Quinn Aragon MD;  Location: [facility] ENDO;  Service: General;  Laterality: N/A;    EYE SURGERY      FLEXIBLE SIGMOIDOSCOPY N/A 1/18/2019    Procedure: SIGMOIDOSCOPY, FLEXIBLE;  Surgeon: Quinn Aragon MD;  Location: [facility] ENDO;  Service: General;  Laterality: N/A;    ILEOSTOMY N/A 10/30/2018    Procedure: CREATION, ILEOSTOMY;  Surgeon: Kevin Lindsay MD;  Location: [facility] OR;  Service: General;  Laterality: N/A;    Ileostomy reversal  01/2019    KNEE SURGERY      MOBILIZATION OF SPLENIC FLEXURE N/A 10/30/2018    Procedure: MOBILIZATION, SPLENIC FLEXURE;  Surgeon: Kevin Lindsay MD;  Location: [facility] OR;  Service: General;  Laterality: N/A;    REVISION COLOSTOMY N/A 10/30/2018    Procedure: REVISION OR CLOSURE, COLOSTOMY;  Surgeon: Kevin Lindsay MD;  Location: [facility] OR;  Service: General;  Laterality: N/A;  Parastomal Hernia Repair    RIGHT HEMICOLECTOMY Right 10/30/2018    Procedure: HEMICOLECTOMY, RIGHT;  Surgeon: Kevin Lindsay MD;  Location: [facility] OR;  Service: General;  Laterality: Right;       Review of patient's allergies indicates:  No Known Allergies    No current facility-administered  "medications on file prior to encounter.      Current Outpatient Medications on File Prior to Encounter   Medication Sig    allopurinoL (ZYLOPRIM) 100 MG tablet Take 1 tablet (100 mg total) by mouth once daily.    aspirin (ECOTRIN) 81 MG EC tablet Take 81 mg by mouth every morning.     bumetanide (BUMEX) 2 MG tablet TAKE 1 TABLET (2 MG TOTAL) BY MOUTH three times a day    cholecalciferol, vitamin D3, (VITAMIN D3) 1,000 unit capsule Take 1,000 Units by mouth once daily.    ENTRESTO 24-26 mg per tablet TAKE 1 TABLET BY MOUTH TWICE A DAY    insulin glargine (LANTUS U-100 INSULIN) 100 unit/mL injection INJECT 50 UNITS TWICE DAILY AS NEEDED WHEN BS< 150    IRON/VITAMIN B COMPLEX (GERITOL ORAL) Take by mouth.    multivit-min/folic/vit K/lycop (MEN'S 50 PLUS MULTIVITAMIN ORAL) Take by mouth.    simvastatin (ZOCOR) 10 MG tablet Take 1 tablet (10 mg total) by mouth every evening.    albuterol (PROVENTIL/VENTOLIN HFA) 90 mcg/actuation inhaler Inhale 2 puffs into the lungs every 6 (six) hours as needed for Wheezing. Rescue    BD INSULIN SYRINGE ULTRA-FINE 0.5 mL 31 gauge x 5/16 Syrg USE AS DIRECTED TO INJECT INSULIN TWO TIMES DAILY    bimatoprost (LUMIGAN) 0.03 % ophthalmic drops Place 1 drop into the left eye every evening.    blood sugar diagnostic Strp 1 strip by Misc.(Non-Drug; Combo Route) route 4 (four) times daily. Telcare    brimonidine-timolol (COMBIGAN) 0.2-0.5 % Drop Place 1 drop into both eyes every 12 (twelve) hours.    dorzolamide-timolol 2-0.5% (COSOPT) 22.3-6.8 mg/mL ophthalmic solution Place 1 drop into both eyes 2 (two) times daily.    ketoconazole (NIZORAL) 2 % cream AAA bid prn rash of groin. For Flares.    lancets Misc For tel care    latanoprost (XALATAN) 0.005 % ophthalmic solution Place 1 drop into both eyes once daily.    pantoprazole (PROTONIX) 40 MG tablet TAKE 1 TABLET BY MOUTH EVERY DAY FOR ACID REFLUX    pen needle, diabetic (BD ULTRA-FINE JOSLYN PEN NEEDLE) 32 gauge x 5/32" Ndle " 1 each by Misc.(Non-Drug; Combo Route) route 4 (four) times daily.     Family History     Problem Relation (Age of Onset)    Cancer Mother    Heart disease Father    No Known Problems Sister, Brother, Maternal Aunt, Maternal Uncle, Paternal Aunt, Paternal Uncle, Maternal Grandmother, Maternal Grandfather, Paternal Grandmother, Paternal Grandfather    Stroke Father        Tobacco Use    Smoking status: Never Smoker    Smokeless tobacco: Never Used   Substance and Sexual Activity    Alcohol use: No     Alcohol/week: 0.0 standard drinks     Frequency: Never    Drug use: No    Sexual activity: Not Currently     Review of Systems   Constitutional: Positive for activity change. Negative for appetite change, fatigue and fever.   HENT: Negative for congestion, postnasal drip, sinus pressure, sore throat and trouble swallowing.    Eyes: Negative.    Respiratory: Positive for shortness of breath. Negative for cough and wheezing.    Cardiovascular: Positive for leg swelling. Negative for chest pain and palpitations.   Gastrointestinal: Negative for abdominal distention, abdominal pain, diarrhea, nausea and vomiting.   Endocrine: Negative for cold intolerance and heat intolerance.   Genitourinary: Negative for difficulty urinating, dysuria, flank pain, frequency and urgency.   Musculoskeletal: Negative for arthralgias, back pain and myalgias.   Skin: Negative for color change and wound.   Allergic/Immunologic: Negative for environmental allergies and food allergies.   Neurological: Negative for dizziness, weakness and headaches.   Hematological: Does not bruise/bleed easily.   Psychiatric/Behavioral: Negative for agitation and confusion. The patient is not nervous/anxious.      Objective:     Vital Signs (Most Recent):  Temp: 98.3 °F (36.8 °C) (05/28/20 1737)  Pulse: 101 (05/28/20 1740)  Resp: 18 (05/28/20 1703)  BP: 134/81 (05/28/20 1702)  SpO2: 100 % (05/28/20 1740) Vital Signs (24h Range):  Temp:  [97.7 °F (36.5  °C)-98.3 °F (36.8 °C)] 98.3 °F (36.8 °C)  Pulse:  [] 101  Resp:  [14-24] 18  SpO2:  [96 %-100 %] 100 %  BP: (134-151)/(81-98) 134/81     Weight: 106.5 kg (234 lb 11.2 oz)  Body mass index is 35.69 kg/m².    Physical Exam   Constitutional: He is oriented to person, place, and time. He appears well-developed and well-nourished.   HENT:   Head: Normocephalic.   Nose: Nose normal.   Eyes:   R eye deformity s/p accident in 1950    Neck: JVD present.   Cardiovascular: Normal rate, regular rhythm and normal heart sounds.   Pulmonary/Chest: He has decreased breath sounds in the right lower field and the left lower field. He has no wheezes. He has rales.   Abdominal: Soft. Bowel sounds are normal. He exhibits distension. There is no tenderness.   Genitourinary:   Genitourinary Comments: Deferred    Musculoskeletal: He exhibits edema (BLE- 2+/3+ edema ). He exhibits no tenderness.   Neurological: He is alert and oriented to person, place, and time.   Altered speech pattern (chronic)    Skin: Skin is warm and dry.   Psychiatric: He has a normal mood and affect. His behavior is normal.           Significant Labs:   ABGs:   Recent Labs   Lab 05/28/20  1612   PH 7.384   PCO2 46.7*   HCO3 27.9   POCSATURATED 86*   BE 3     CBC:   Recent Labs   Lab 05/28/20  1601   WBC 5.74   HGB 12.8*   HCT 41.4   *     CMP:   Recent Labs   Lab 05/28/20  1601      K 4.0   CL 99   CO2 26   *   BUN 34*   CREATININE 3.0*   CALCIUM 9.2   PROT 6.9   ALBUMIN 3.0*   BILITOT 1.7*   ALKPHOS 91   AST 50*   ALT 25   ANIONGAP 11   EGFRNONAA 21*     Troponin:   Recent Labs   Lab 05/28/20  1601   TROPONINI 0.061*       Significant Imaging:   Imaging Results          X-Ray Chest AP Portable (Final result)  Result time 05/28/20 16:00:55    Final result by Asif Isaacs MD (05/28/20 16:00:55)                 Impression:      No acute radiographic abnormality in the chest.      Electronically signed by: Asif  Isaacs  Date:    05/28/2020  Time:    16:00             Narrative:    EXAMINATION:  XR CHEST AP PORTABLE    CLINICAL HISTORY:  shortness of breath;    TECHNIQUE:  Single frontal view of the chest was performed.    COMPARISON:  Chest radiograph 05/15/2020 with priors    FINDINGS:  Left-sided AICD with transvenous leads in stable position.  Cardiomediastinal silhouette is unchanged and enlarged.  Aortic atherosclerotic calcifications.  Lungs are symmetrically expanded.  No acute or new airspace consolidative opacity.  No large effusion.  No pneumothorax.  Visualized osseous structures appear intact.

## 2020-05-28 NOTE — TELEPHONE ENCOUNTER
S/W pt's wife, advised her that Mr. Choudhury needs to keep his scheduled appt on 06/01/20. She verbalized understanding/geneva

## 2020-05-29 LAB
ANION GAP SERPL CALC-SCNC: 8 MMOL/L (ref 8–16)
AORTIC ROOT ANNULUS: 3.63 CM
ASCENDING AORTA: 3.5 CM
AV INDEX (PROSTH): 1.02
AV MEAN GRADIENT: 2 MMHG
AV PEAK GRADIENT: 3 MMHG
AV VALVE AREA: 3.24 CM2
AV VELOCITY RATIO: 0.78
BSA FOR ECHO PROCEDURE: 2.25 M2
BUN SERPL-MCNC: 36 MG/DL (ref 8–23)
CALCIUM SERPL-MCNC: 8.7 MG/DL (ref 8.7–10.5)
CHLORIDE SERPL-SCNC: 99 MMOL/L (ref 95–110)
CO2 SERPL-SCNC: 30 MMOL/L (ref 23–29)
CREAT SERPL-MCNC: 3 MG/DL (ref 0.5–1.4)
CV ECHO LV RWT: 0.37 CM
DOP CALC AO PEAK VEL: 0.88 M/S
DOP CALC AO VTI: 16.61 CM
DOP CALC LVOT AREA: 3.2 CM2
DOP CALC LVOT DIAMETER: 2.01 CM
DOP CALC LVOT PEAK VEL: 0.69 M/S
DOP CALC LVOT STROKE VOLUME: 53.76 CM3
DOP CALCLVOT PEAK VEL VTI: 16.95 CM
E WAVE DECELERATION TIME: 141.72 MSEC
E/A RATIO: 2.63
ECHO LV POSTERIOR WALL: 1.02 CM (ref 0.6–1.1)
EST. GFR  (AFRICAN AMERICAN): 24 ML/MIN/1.73 M^2
EST. GFR  (NON AFRICAN AMERICAN): 21 ML/MIN/1.73 M^2
ESTIMATED AVG GLUCOSE: 134 MG/DL (ref 68–131)
ESTIMATED AVG GLUCOSE: 134 MG/DL (ref 68–131)
FRACTIONAL SHORTENING: 5 % (ref 28–44)
GLUCOSE SERPL-MCNC: 82 MG/DL (ref 70–110)
HBA1C MFR BLD HPLC: 6.3 % (ref 4–5.6)
HBA1C MFR BLD HPLC: 6.3 % (ref 4–5.6)
INTERVENTRICULAR SEPTUM: 1.11 CM (ref 0.6–1.1)
IVRT: 78.43 MSEC
LA MAJOR: 5.28 CM
LA MINOR: 5.18 CM
LA WIDTH: 3.58 CM
LEFT ATRIUM SIZE: 4.16 CM
LEFT ATRIUM VOLUME INDEX: 30.4 ML/M2
LEFT ATRIUM VOLUME: 66.2 CM3
LEFT INTERNAL DIMENSION IN SYSTOLE: 5.3 CM (ref 2.1–4)
LEFT VENTRICLE DIASTOLIC VOLUME INDEX: 69.02 ML/M2
LEFT VENTRICLE DIASTOLIC VOLUME: 150.25 ML
LEFT VENTRICLE MASS INDEX: 108 G/M2
LEFT VENTRICLE SYSTOLIC VOLUME INDEX: 62.2 ML/M2
LEFT VENTRICLE SYSTOLIC VOLUME: 135.34 ML
LEFT VENTRICULAR INTERNAL DIMENSION IN DIASTOLE: 5.55 CM (ref 3.5–6)
LEFT VENTRICULAR MASS: 235.25 G
MV PEAK A VEL: 0.3 M/S
MV PEAK E VEL: 0.79 M/S
PISA TR MAX VEL: 3.46 M/S
POCT GLUCOSE: 100 MG/DL (ref 70–110)
POCT GLUCOSE: 108 MG/DL (ref 70–110)
POCT GLUCOSE: 110 MG/DL (ref 70–110)
POCT GLUCOSE: 122 MG/DL (ref 70–110)
POCT GLUCOSE: 97 MG/DL (ref 70–110)
POTASSIUM SERPL-SCNC: 3.9 MMOL/L (ref 3.5–5.1)
RA MAJOR: 4.8 CM
RA PRESSURE: 15 MMHG
RA WIDTH: 4.13 CM
RIGHT VENTRICULAR END-DIASTOLIC DIMENSION: 3.62 CM
SINUS: 3.66 CM
SODIUM SERPL-SCNC: 137 MMOL/L (ref 136–145)
STJ: 3.56 CM
TR MAX PG: 48 MMHG
TRICUSPID ANNULAR PLANE SYSTOLIC EXCURSION: 1.21 CM
TV REST PULMONARY ARTERY PRESSURE: 63 MMHG

## 2020-05-29 PROCEDURE — 94640 AIRWAY INHALATION TREATMENT: CPT

## 2020-05-29 PROCEDURE — 63600175 PHARM REV CODE 636 W HCPCS: Performed by: INTERNAL MEDICINE

## 2020-05-29 PROCEDURE — 25000242 PHARM REV CODE 250 ALT 637 W/ HCPCS: Performed by: INTERNAL MEDICINE

## 2020-05-29 PROCEDURE — S0171 BUMETANIDE 0.5 MG: HCPCS | Performed by: INTERNAL MEDICINE

## 2020-05-29 PROCEDURE — 94761 N-INVAS EAR/PLS OXIMETRY MLT: CPT

## 2020-05-29 PROCEDURE — 80048 BASIC METABOLIC PNL TOTAL CA: CPT

## 2020-05-29 PROCEDURE — 99223 1ST HOSP IP/OBS HIGH 75: CPT | Mod: 25,,, | Performed by: INTERNAL MEDICINE

## 2020-05-29 PROCEDURE — 99900035 HC TECH TIME PER 15 MIN (STAT)

## 2020-05-29 PROCEDURE — 97802 MEDICAL NUTRITION INDIV IN: CPT

## 2020-05-29 PROCEDURE — 25000003 PHARM REV CODE 250: Performed by: PHYSICIAN ASSISTANT

## 2020-05-29 PROCEDURE — 25000003 PHARM REV CODE 250: Performed by: INTERNAL MEDICINE

## 2020-05-29 PROCEDURE — 21400001 HC TELEMETRY ROOM

## 2020-05-29 PROCEDURE — 99223 PR INITIAL HOSPITAL CARE,LEVL III: ICD-10-PCS | Mod: 25,,, | Performed by: INTERNAL MEDICINE

## 2020-05-29 PROCEDURE — 25000242 PHARM REV CODE 250 ALT 637 W/ HCPCS: Performed by: NURSE PRACTITIONER

## 2020-05-29 PROCEDURE — 96376 TX/PRO/DX INJ SAME DRUG ADON: CPT

## 2020-05-29 PROCEDURE — 96372 THER/PROPH/DIAG INJ SC/IM: CPT

## 2020-05-29 PROCEDURE — C9399 UNCLASSIFIED DRUGS OR BIOLOG: HCPCS | Performed by: INTERNAL MEDICINE

## 2020-05-29 PROCEDURE — 36415 COLL VENOUS BLD VENIPUNCTURE: CPT

## 2020-05-29 PROCEDURE — 27000221 HC OXYGEN, UP TO 24 HOURS

## 2020-05-29 RX ORDER — METOLAZONE 5 MG/1
5 TABLET ORAL DAILY
Status: DISCONTINUED | OUTPATIENT
Start: 2020-05-29 | End: 2020-06-02 | Stop reason: HOSPADM

## 2020-05-29 RX ORDER — LEVALBUTEROL INHALATION SOLUTION 0.63 MG/3ML
0.63 SOLUTION RESPIRATORY (INHALATION) EVERY 8 HOURS
Status: DISCONTINUED | OUTPATIENT
Start: 2020-05-29 | End: 2020-05-31

## 2020-05-29 RX ORDER — BUDESONIDE 0.5 MG/2ML
0.5 INHALANT ORAL EVERY 12 HOURS
Status: DISCONTINUED | OUTPATIENT
Start: 2020-05-29 | End: 2020-06-02 | Stop reason: HOSPADM

## 2020-05-29 RX ORDER — IPRATROPIUM BROMIDE AND ALBUTEROL SULFATE 2.5; .5 MG/3ML; MG/3ML
3 SOLUTION RESPIRATORY (INHALATION) EVERY 4 HOURS PRN
Status: DISCONTINUED | OUTPATIENT
Start: 2020-05-29 | End: 2020-06-02 | Stop reason: HOSPADM

## 2020-05-29 RX ADMIN — BUMETANIDE 3 MG: 0.25 INJECTION INTRAMUSCULAR; INTRAVENOUS at 08:05

## 2020-05-29 RX ADMIN — PANTOPRAZOLE SODIUM 40 MG: 40 TABLET, DELAYED RELEASE ORAL at 08:05

## 2020-05-29 RX ADMIN — IPRATROPIUM BROMIDE AND ALBUTEROL SULFATE 3 ML: .5; 3 SOLUTION RESPIRATORY (INHALATION) at 08:05

## 2020-05-29 RX ADMIN — LATANOPROST 1 DROP: 50 SOLUTION OPHTHALMIC at 08:05

## 2020-05-29 RX ADMIN — SIMVASTATIN 10 MG: 5 TABLET, FILM COATED ORAL at 08:05

## 2020-05-29 RX ADMIN — BUDESONIDE 0.5 MG: 0.5 INHALANT RESPIRATORY (INHALATION) at 12:05

## 2020-05-29 RX ADMIN — ASPIRIN 81 MG: 81 TABLET, COATED ORAL at 06:05

## 2020-05-29 RX ADMIN — METOLAZONE 5 MG: 5 TABLET ORAL at 08:05

## 2020-05-29 RX ADMIN — CARVEDILOL 3.12 MG: 3.12 TABLET, FILM COATED ORAL at 04:05

## 2020-05-29 RX ADMIN — TIMOLOL MALEATE 1 DROP: 5 SOLUTION OPHTHALMIC at 08:05

## 2020-05-29 RX ADMIN — CARVEDILOL 3.12 MG: 3.12 TABLET, FILM COATED ORAL at 08:05

## 2020-05-29 RX ADMIN — SACUBITRIL AND VALSARTAN 1 TABLET: 24; 26 TABLET, FILM COATED ORAL at 08:05

## 2020-05-29 RX ADMIN — IPRATROPIUM BROMIDE AND ALBUTEROL SULFATE 3 ML: .5; 3 SOLUTION RESPIRATORY (INHALATION) at 12:05

## 2020-05-29 RX ADMIN — BRIMONIDINE TARTRATE 1 DROP: 1.5 SOLUTION OPHTHALMIC at 08:05

## 2020-05-29 RX ADMIN — ENOXAPARIN SODIUM 30 MG: 100 INJECTION SUBCUTANEOUS at 04:05

## 2020-05-29 RX ADMIN — BUMETANIDE 3 MG: 0.25 INJECTION INTRAMUSCULAR; INTRAVENOUS at 09:05

## 2020-05-29 RX ADMIN — INSULIN DETEMIR 20 UNITS: 100 INJECTION, SOLUTION SUBCUTANEOUS at 08:05

## 2020-05-29 RX ADMIN — ALLOPURINOL 100 MG: 100 TABLET ORAL at 08:05

## 2020-05-29 RX ADMIN — LEVALBUTEROL HYDROCHLORIDE 0.63 MG: 0.63 SOLUTION RESPIRATORY (INHALATION) at 12:05

## 2020-05-29 RX ADMIN — BUDESONIDE 0.5 MG: 0.5 INHALANT RESPIRATORY (INHALATION) at 08:05

## 2020-05-29 NOTE — PLAN OF CARE
NEURO: AAO  PAIN: No c/o this shift  CARDIAC: Paced on monitor #8624  RESP: 2 L/NC. Continuously have to reapply d/t pt removing cannula. Pt gets SOB with exertion. Pt also has audible wheezing with croupy sounding cough with activity. PRN DuoNebs ordered. RT administered. Pt reports that it did seem to help.  MUSCULO: Very weak and unsteady on feet. Bed alarm set at all times d/t pt does not always call for assistance to stand and use urinal.  GI/: No abnormalities. Pt had BM during shift.  SKIN: Intact  SAFETY: Bed low/locked, SR up x2, CB in easy reach  POC: Ongoing  HOURLY ROUNDING COMPLETE  WILL CONTINUE TO MONITOR

## 2020-05-29 NOTE — ASSESSMENT & PLAN NOTE
- Continue Diuresis   - supplemental O2 to keep O2 sat 92% or higher   - Home O2 eval pending clinical course   -H/O Sleep Apnea

## 2020-05-29 NOTE — CONSULTS
[facility]  Cardiology  Consult Note    Patient Name: Yan Choudhury  MRN: [id number]  Admission Date: 5/28/2020  Hospital Length of Stay: 0 days  Code Status: Full Code   Attending Provider: Cleve Garcia MD   Consulting Provider: Jovana Woodard PA-C  Primary Care Physician: Sandra Estevez MD  Principal Problem:Acute on chronic systolic heart failure    Patient information was obtained from patient, past medical records and ER records.     Inpatient consult to Cardiology  Consult performed by: Jovana Woodard PA-C  Consult ordered by: Cleve Garcia MD        Subjective:     Chief Complaint:  Shortness of breath     HPI:   Mr. Choudhury is a 65 year old male patient whose current medical conditions include combined CHF, CAD/ICM, LBBB, HTN, CRI, DM, CRT-CD, dyslipidemia, and obesity who presented to [facility] ED yesterday with a chief complaint of worsening SOB over the past two weeks. Associated symptoms included orthopnea, BLE edema, abdominal bloating, and a weight gain of 10 lbs. Patient denied any associated davion chest pain, palpitations, PND, near syncope, syncope, fever, or chills. Initial workup in ED revealed BNP > 3,000, creatinine of 3.0, and troponin of 0.061 and patient was subsequently admitted for further evaluation and treatment. Cardiology consulted to assist with management. Patient seen and examined today, sitting up in bed. +Conversational dyspnea noted. Admits to SOB and BLE edema. Remains chest pain free. He admits to dietary indiscretion, recently ate crawfish. He reports compliance with his medications. Chart reviewed. Troponin chronically elevated. Stress 6/19 negative for ischemia, + fixed defect. Repeat echo pending.      Past Medical History:   Diagnosis Date    Arthritis     CAD (coronary artery disease)     Cataract     Chronic combined systolic and diastolic congestive heart failure 3/24/2015    CKD (chronic kidney disease), stage III     Diabetes mellitus type II       AM    Diabetic retinopathy     anti Veg F injections for macular edema    Diverticulitis of colon     ED (erectile dysfunction)     Encounter for blood transfusion     Glaucoma     HTN (hypertension)     Hyperlipidemia     Ischemic cardiomyopathy     Obesity     JAHAIRA on CPAP     Pacemaker     Pulmonary HTN        Past Surgical History:   Procedure Laterality Date    CARDIAC CATHETERIZATION  2009    CHOLECYSTECTOMY      COLON SURGERY      Sigmoid resection    COLONOSCOPY N/A 10/11/2018    Procedure: COLONOSCOPY;  Surgeon: Quinn Aragon MD;  Location: [facility] ENDO;  Service: General;  Laterality: N/A;    EYE SURGERY      FLEXIBLE SIGMOIDOSCOPY N/A 1/18/2019    Procedure: SIGMOIDOSCOPY, FLEXIBLE;  Surgeon: Quinn Aragon MD;  Location: [facility] ENDO;  Service: General;  Laterality: N/A;    ILEOSTOMY N/A 10/30/2018    Procedure: CREATION, ILEOSTOMY;  Surgeon: Kevin Lindsay MD;  Location: [facility] OR;  Service: General;  Laterality: N/A;    Ileostomy reversal  01/2019    KNEE SURGERY      MOBILIZATION OF SPLENIC FLEXURE N/A 10/30/2018    Procedure: MOBILIZATION, SPLENIC FLEXURE;  Surgeon: Kevin Lindsay MD;  Location: [facility] OR;  Service: General;  Laterality: N/A;    REVISION COLOSTOMY N/A 10/30/2018    Procedure: REVISION OR CLOSURE, COLOSTOMY;  Surgeon: Kevin Lindsay MD;  Location: [facility] OR;  Service: General;  Laterality: N/A;  Parastomal Hernia Repair    RIGHT HEMICOLECTOMY Right 10/30/2018    Procedure: HEMICOLECTOMY, RIGHT;  Surgeon: Kevin Lindsay MD;  Location: [facility] OR;  Service: General;  Laterality: Right;       Review of patient's allergies indicates:  No Known Allergies    No current facility-administered medications on file prior to encounter.      Current Outpatient Medications on File Prior to Encounter   Medication Sig    allopurinoL (ZYLOPRIM) 100 MG tablet Take 1 tablet (100 mg total) by mouth once daily.    aspirin (ECOTRIN) 81 MG EC tablet Take 81 mg by mouth  "every morning.     bumetanide (BUMEX) 2 MG tablet TAKE 1 TABLET (2 MG TOTAL) BY MOUTH three times a day    cholecalciferol, vitamin D3, (VITAMIN D3) 1,000 unit capsule Take 1,000 Units by mouth once daily.    ENTRESTO 24-26 mg per tablet TAKE 1 TABLET BY MOUTH TWICE A DAY    insulin glargine (LANTUS U-100 INSULIN) 100 unit/mL injection INJECT 50 UNITS TWICE DAILY AS NEEDED WHEN BS< 150    IRON/VITAMIN B COMPLEX (GERITOL ORAL) Take by mouth.    multivit-min/folic/vit K/lycop (MEN'S 50 PLUS MULTIVITAMIN ORAL) Take by mouth.    simvastatin (ZOCOR) 10 MG tablet Take 1 tablet (10 mg total) by mouth every evening.    albuterol (PROVENTIL/VENTOLIN HFA) 90 mcg/actuation inhaler Inhale 2 puffs into the lungs every 6 (six) hours as needed for Wheezing. Rescue    BD INSULIN SYRINGE ULTRA-FINE 0.5 mL 31 gauge x 5/16 Syrg USE AS DIRECTED TO INJECT INSULIN TWO TIMES DAILY    bimatoprost (LUMIGAN) 0.03 % ophthalmic drops Place 1 drop into the left eye every evening.    blood sugar diagnostic Strp 1 strip by Misc.(Non-Drug; Combo Route) route 4 (four) times daily. Telcare    brimonidine-timolol (COMBIGAN) 0.2-0.5 % Drop Place 1 drop into both eyes every 12 (twelve) hours.    dorzolamide-timolol 2-0.5% (COSOPT) 22.3-6.8 mg/mL ophthalmic solution Place 1 drop into both eyes 2 (two) times daily.    ketoconazole (NIZORAL) 2 % cream AAA bid prn rash of groin. For Flares.    lancets [facility] For tel care    latanoprost (XALATAN) 0.005 % ophthalmic solution Place 1 drop into both eyes once daily.    pantoprazole (PROTONIX) 40 MG tablet TAKE 1 TABLET BY MOUTH EVERY DAY FOR ACID REFLUX    pen needle, diabetic (BD ULTRA-FINE JOSLYN PEN NEEDLE) 32 gauge x 5/32" Ndle 1 each by Misc.(Non-Drug; Combo Route) route 4 (four) times daily.     Family History     Problem Relation (Age of Onset)    Cancer Mother    Heart disease Father    No Known Problems Sister, Brother, Maternal Aunt, Maternal Uncle, Paternal Aunt, Paternal Uncle, " Maternal Grandmother, Maternal Grandfather, Paternal Grandmother, Paternal Grandfather    Stroke Father        Tobacco Use    Smoking status: Never Smoker    Smokeless tobacco: Never Used   Substance and Sexual Activity    Alcohol use: No     Alcohol/week: 0.0 standard drinks     Frequency: Never    Drug use: No    Sexual activity: Not Currently     Review of Systems   Constitution: Positive for malaise/fatigue and weight gain.   HENT: Negative.    Eyes: Negative.    Cardiovascular: Positive for dyspnea on exertion, leg swelling and orthopnea.   Respiratory: Positive for shortness of breath.    Endocrine: Negative.    Hematologic/Lymphatic: Negative.    Skin: Negative.    Musculoskeletal: Negative.    Gastrointestinal: Negative.    Genitourinary: Negative.    Neurological: Negative.    Psychiatric/Behavioral: Negative.    Allergic/Immunologic: Negative.      Objective:     Vital Signs (Most Recent):  Temp: 96.6 °F (35.9 °C) (05/29/20 0755)  Pulse: 72 (05/29/20 0808)  Resp: 16 (05/29/20 0808)  BP: 120/77 (05/29/20 0755)  SpO2: 95 % (05/29/20 0808) Vital Signs (24h Range):  Temp:  [96.6 °F (35.9 °C)-98.5 °F (36.9 °C)] 96.6 °F (35.9 °C)  Pulse:  [] 72  Resp:  [14-24] 16  SpO2:  [78 %-100 %] 95 %  BP: (111-151)/(75-98) 120/77     Weight: 105.3 kg (232 lb 2.3 oz)  Body mass index is 35.3 kg/m².    SpO2: 95 %  O2 Device (Oxygen Therapy): nasal cannula      Intake/Output Summary (Last 24 hours) at 5/29/2020 0852  Last data filed at 5/29/2020 0600  Gross per 24 hour   Intake 240 ml   Output 300 ml   Net -60 ml       Lines/Drains/Airways     Peripheral Intravenous Line                 Peripheral IV - Single Lumen 05/28/20 1601 22 G Right Wrist less than 1 day                Physical Exam   Constitutional: He is oriented to person, place, and time. He appears well-developed and well-nourished. No distress.   On supplemental O2    +conversational dyspnea   HENT:   Head: Normocephalic and atraumatic.   Eyes: Pupils  are equal, round, and reactive to light. Right eye exhibits no discharge. Left eye exhibits no discharge.   Neck: Neck supple. JVD present.   Cardiovascular: Normal rate, regular rhythm, S1 normal, S2 normal and normal heart sounds.   No murmur heard.  Pulmonary/Chest: Effort normal and breath sounds normal. No respiratory distress. He has no wheezes. He has no rales.   AICD site well-healed   Abdominal: Soft. He exhibits distension.   Musculoskeletal: He exhibits edema (BLE).   Neurological: He is alert and oriented to person, place, and time.   Skin: Skin is warm and dry. He is not diaphoretic. No erythema.   Psychiatric: He has a normal mood and affect. His behavior is normal. Thought content normal.   Nursing note and vitals reviewed.      Significant Labs:   CMP   Recent Labs   Lab 05/28/20  1601 05/29/20  0347    137   K 4.0 3.9   CL 99 99   CO2 26 30*   * 82   BUN 34* 36*   CREATININE 3.0* 3.0*   CALCIUM 9.2 8.7   PROT 6.9  --    ALBUMIN 3.0*  --    BILITOT 1.7*  --    ALKPHOS 91  --    AST 50*  --    ALT 25  --    ANIONGAP 11 8   ESTGFRAFRICA 24* 24*   EGFRNONAA 21* 21*   , CBC   Recent Labs   Lab 05/28/20  1601   WBC 5.74   HGB 12.8*   HCT 41.4   *   , Troponin   Recent Labs   Lab 05/28/20  1601   TROPONINI 0.061*    and All pertinent lab results from the last 24 hours have been reviewed.    Significant Imaging: Echocardiogram:   2D echo with color flow doppler:   Results for orders placed or performed during the hospital encounter of 04/02/19   2D echo with color flow doppler   Result Value Ref Range    QEF 15 (A) 55 - 65    Mitral Valve Regurgitation MILD     Aortic Valve Regurgitation MODERATE (A)     Est. PA Systolic Pressure 77.41 (A)     Tricuspid Valve Regurgitation MILD TO MODERATE     Narrative    Date of Procedure: 04/03/2019        TEST DESCRIPTION   Technical Quality: This is a portable study performed at the patient's bedside. This is a technically challenging study. There  is poor endocardial definition.     General: A catheter is present in the right-sided cardiac chambers.     Aorta: The aortic root is normal in size, measuring 3.3 cm at sinotubular junction and 3.5 cm at Sinuses of Valsalva. The proximal ascending aorta is normal in size, measuring 3.4 cm across.     Left Atrium: The left atrial volume index is normal, measuring 33.52 cc/m2.     Left Ventricle: The left ventricle is moderately enlarged, with an end-diastolic diameter of 6.3 cm, and an end-systolic diameter of 6.1 cm. LV wall thickness is normal, with the septum and the posterior wall each measuring 1.0 cm across. Relative wall   thickness was normal at 0.32, and the LV mass index was increased at 163.8 g/m2 consistent with eccentric left ventricular hypertrophy. There are no regional wall motion abnormalities. Left ventricular systolic function appears severely depressed.   Visually estimated ejection fraction is 15-20%. The LV Doppler derived stroke volume equals 45.0 ccs.     Diastolic indices:     Right Atrium: The right atrium is normal in size, measuring 5.8 cm in length and 3.8 cm in width in the apical view.     Right Ventricle: The right ventricle is moderately enlarged in size. Global right ventricular systolic function appears moderately depressed. Tricuspid annular plane systolic excursion (TAPSE) is 1.1 cm. The estimated PA systolic pressure is 77 mmHg.     Aortic Valve:  The peak gradient obtained across the aortic valve is 5 mmHg, with a mean gradient of 3 mmHg. Using a left ventricular outflow tract diameter of 2.0 cm, a left ventricular outflow tract velocity time integral of 14 cm, and a peak   instantaneous transvalvular velocity time integral of 19 cm, the calculated aortic valve area is 2.37 cm2(AVAi is 1.21 cm2/m2). Additionally, there is moderate aortic regurgitation. There is a pressure half time of 371.0 msec.     Mitral Valve:  There is mild mitral regurgitation.     Tricuspid Valve:  There  is mild to moderate tricuspid regurgitation.     IVC: IVC is enlarged and collapses < 50% with a sniff, suggesting high right atrial pressure of 15 mmHg.     Atrial Septum: The atrial septum is intact.     Intracavitary: There is no evidence of pericardial effusion, intracavity mass, thrombi, or vegetation.         CONCLUSIONS     1 - Moderate left ventricular enlargement.     2 - Eccentric hypertrophy.     3 - No wall motion abnormalities.     4 - Severely depressed left ventricular systolic function (EF 15-20%).     5 - Right ventricular enlargement with moderately depressed systolic function.     6 - Pulmonary hypertension. The estimated PA systolic pressure is 77 mmHg.     7 - Moderate aortic regurgitation.     8 - Mild mitral regurgitation.     9 - Mild to moderate tricuspid regurgitation.     10 - Increased central venous pressure.             This document has been electronically    SIGNED BY: Zayra Mercer MD On: 04/03/2019 11:24   , EKG: Reviewed and X-Ray: CXR: X-Ray Chest 1 View (CXR): No results found for this visit on 05/28/20. and X-Ray Chest PA and Lateral (CXR): No results found for this visit on 05/28/20.    Assessment and Plan:   Patient who presents with decompensated combined CHF. Needs IV diuresis, add metolazone and assess response. Reassess in AM, consider Lasix gtt if no improvement.    * Acute on chronic systolic heart failure with EF 15-20%  -Presented with decompensated combined CHF, in part due to dietary indiscretion  -Continue IV diuresis  -Add 5 mg of metolazone today and assess response  -Consider Lasix gtt if no improvement  -Continue Entresto, Coreg  -Repeat echo pending  -MPI stress test 6/19 negative for ischemia, + fixed defects    Elevated LFTs  -Secondary to hepatic congestion from CHF  -Monitor with diuresis    Type 2 diabetes mellitus, with long-term current use of insulin  -Mgmt as per primary team    Biventricular ICD (implantable cardioverter-defibrillator) in place  -No  AICD shocks    CKD (chronic kidney disease) stage 4, GFR 15-29 ml/min  -Creatinine 3.0, likely cardiorenal  -Monitor with diuresis  -Consider nephrology consult    Unspecified essential hypertension  -Continue home medications        VTE Risk Mitigation (From admission, onward)         Ordered     enoxaparin injection 30 mg  Daily      05/28/20 0121                Thank you for your consult. I will follow-up with patient. Please contact us if you have any additional questions.    Jovana Woodard PA-C  Cardiology   [facility]

## 2020-05-29 NOTE — HPI
Mr. Choudhury is a 65 year old male patient whose current medical conditions include combined CHF, CAD/ICM, LBBB, HTN, CRI, DM, CRT-CD, dyslipidemia, and obesity who presented to [facility] ED yesterday with a chief complaint of worsening SOB over the past two weeks. Associated symptoms included orthopnea, BLE edema, abdominal bloating, and a weight gain of 10 lbs. Patient denied any associated davion chest pain, palpitations, PND, near syncope, syncope, fever, or chills. Initial workup in ED revealed BNP > 3,000, creatinine of 3.0, and troponin of 0.061 and patient was subsequently admitted for further evaluation and treatment. Cardiology consulted to assist with management. Patient seen and examined today, sitting up in bed. +Conversational dyspnea noted. Admits to SOB and BLE edema. Remains chest pain free. He admits to dietary indiscretion, recently ate crawfish. He reports compliance with his medications. Chart reviewed. Troponin chronically elevated. Stress 6/19 negative for ischemia, + fixed defect. Repeat echo pending.

## 2020-05-29 NOTE — ASSESSMENT & PLAN NOTE
- Initiate CHF pathway   -Start BB  -Continue Entresto   -Strict intake /Output   - Salt restriction 2 gram /day   -Fluid restriction 1.5 L/day

## 2020-05-29 NOTE — SUBJECTIVE & OBJECTIVE
Past Medical History:   Diagnosis Date    Arthritis     CAD (coronary artery disease)     Cataract     Chronic combined systolic and diastolic congestive heart failure 3/24/2015    CKD (chronic kidney disease), stage III     Diabetes mellitus type II      AM    Diabetic retinopathy     anti Veg F injections for macular edema    Diverticulitis of colon     ED (erectile dysfunction)     Encounter for blood transfusion     Glaucoma     HTN (hypertension)     Hyperlipidemia     Ischemic cardiomyopathy     Obesity     JAHAIRA on CPAP     Pacemaker     Pulmonary HTN        Past Surgical History:   Procedure Laterality Date    CARDIAC CATHETERIZATION  2009    CHOLECYSTECTOMY      COLON SURGERY      Sigmoid resection    COLONOSCOPY N/A 10/11/2018    Procedure: COLONOSCOPY;  Surgeon: Quinn Aragon MD;  Location: [facility] ENDO;  Service: General;  Laterality: N/A;    EYE SURGERY      FLEXIBLE SIGMOIDOSCOPY N/A 1/18/2019    Procedure: SIGMOIDOSCOPY, FLEXIBLE;  Surgeon: Quinn Aragon MD;  Location: [facility] ENDO;  Service: General;  Laterality: N/A;    ILEOSTOMY N/A 10/30/2018    Procedure: CREATION, ILEOSTOMY;  Surgeon: Kevin Lindsay MD;  Location: [facility] OR;  Service: General;  Laterality: N/A;    Ileostomy reversal  01/2019    KNEE SURGERY      MOBILIZATION OF SPLENIC FLEXURE N/A 10/30/2018    Procedure: MOBILIZATION, SPLENIC FLEXURE;  Surgeon: Kevin Lindsay MD;  Location: [facility] OR;  Service: General;  Laterality: N/A;    REVISION COLOSTOMY N/A 10/30/2018    Procedure: REVISION OR CLOSURE, COLOSTOMY;  Surgeon: Kevin Lindsay MD;  Location: [facility] OR;  Service: General;  Laterality: N/A;  Parastomal Hernia Repair    RIGHT HEMICOLECTOMY Right 10/30/2018    Procedure: HEMICOLECTOMY, RIGHT;  Surgeon: Kevin Lindsay MD;  Location: [facility] OR;  Service: General;  Laterality: Right;       Review of patient's allergies indicates:  No Known Allergies    No current facility-administered  "medications on file prior to encounter.      Current Outpatient Medications on File Prior to Encounter   Medication Sig    allopurinoL (ZYLOPRIM) 100 MG tablet Take 1 tablet (100 mg total) by mouth once daily.    aspirin (ECOTRIN) 81 MG EC tablet Take 81 mg by mouth every morning.     bumetanide (BUMEX) 2 MG tablet TAKE 1 TABLET (2 MG TOTAL) BY MOUTH three times a day    cholecalciferol, vitamin D3, (VITAMIN D3) 1,000 unit capsule Take 1,000 Units by mouth once daily.    ENTRESTO 24-26 mg per tablet TAKE 1 TABLET BY MOUTH TWICE A DAY    insulin glargine (LANTUS U-100 INSULIN) 100 unit/mL injection INJECT 50 UNITS TWICE DAILY AS NEEDED WHEN BS< 150    IRON/VITAMIN B COMPLEX (GERITOL ORAL) Take by mouth.    multivit-min/folic/vit K/lycop (MEN'S 50 PLUS MULTIVITAMIN ORAL) Take by mouth.    simvastatin (ZOCOR) 10 MG tablet Take 1 tablet (10 mg total) by mouth every evening.    albuterol (PROVENTIL/VENTOLIN HFA) 90 mcg/actuation inhaler Inhale 2 puffs into the lungs every 6 (six) hours as needed for Wheezing. Rescue    BD INSULIN SYRINGE ULTRA-FINE 0.5 mL 31 gauge x 5/16 Syrg USE AS DIRECTED TO INJECT INSULIN TWO TIMES DAILY    bimatoprost (LUMIGAN) 0.03 % ophthalmic drops Place 1 drop into the left eye every evening.    blood sugar diagnostic Strp 1 strip by Misc.(Non-Drug; Combo Route) route 4 (four) times daily. [employer]    brimonidine-timolol (COMBIGAN) 0.2-0.5 % Drop Place 1 drop into both eyes every 12 (twelve) hours.    dorzolamide-timolol 2-0.5% (COSOPT) 22.3-6.8 mg/mL ophthalmic solution Place 1 drop into both eyes 2 (two) times daily.    ketoconazole (NIZORAL) 2 % cream AAA bid prn rash of groin. For Flares.    lancets Misc For tel care    latanoprost (XALATAN) 0.005 % ophthalmic solution Place 1 drop into both eyes once daily.    pantoprazole (PROTONIX) 40 MG tablet TAKE 1 TABLET BY MOUTH EVERY DAY FOR ACID REFLUX    pen needle, diabetic (BD ULTRA-FINE JOSLYN PEN NEEDLE) 32 gauge x 5/32" Ndle " 1 each by Misc.(Non-Drug; Combo Route) route 4 (four) times daily.     Family History     Problem Relation (Age of Onset)    Cancer Mother    Heart disease Father    No Known Problems Sister, Brother, Maternal Aunt, Maternal Uncle, Paternal Aunt, Paternal Uncle, Maternal Grandmother, Maternal Grandfather, Paternal Grandmother, Paternal Grandfather    Stroke Father        Tobacco Use    Smoking status: Never Smoker    Smokeless tobacco: Never Used   Substance and Sexual Activity    Alcohol use: No     Alcohol/week: 0.0 standard drinks     Frequency: Never    Drug use: No    Sexual activity: Not Currently     Review of Systems   Constitution: Positive for malaise/fatigue and weight gain.   HENT: Negative.    Eyes: Negative.    Cardiovascular: Positive for dyspnea on exertion, leg swelling and orthopnea.   Respiratory: Positive for shortness of breath.    Endocrine: Negative.    Hematologic/Lymphatic: Negative.    Skin: Negative.    Musculoskeletal: Negative.    Gastrointestinal: Negative.    Genitourinary: Negative.    Neurological: Negative.    Psychiatric/Behavioral: Negative.    Allergic/Immunologic: Negative.      Objective:     Vital Signs (Most Recent):  Temp: 96.6 °F (35.9 °C) (05/29/20 0755)  Pulse: 72 (05/29/20 0808)  Resp: 16 (05/29/20 0808)  BP: 120/77 (05/29/20 0755)  SpO2: 95 % (05/29/20 0808) Vital Signs (24h Range):  Temp:  [96.6 °F (35.9 °C)-98.5 °F (36.9 °C)] 96.6 °F (35.9 °C)  Pulse:  [] 72  Resp:  [14-24] 16  SpO2:  [78 %-100 %] 95 %  BP: (111-151)/(75-98) 120/77     Weight: 105.3 kg (232 lb 2.3 oz)  Body mass index is 35.3 kg/m².    SpO2: 95 %  O2 Device (Oxygen Therapy): nasal cannula      Intake/Output Summary (Last 24 hours) at 5/29/2020 0852  Last data filed at 5/29/2020 0600  Gross per 24 hour   Intake 240 ml   Output 300 ml   Net -60 ml       Lines/Drains/Airways     Peripheral Intravenous Line                 Peripheral IV - Single Lumen 05/28/20 1601 22 G Right Wrist less than 1  day                Physical Exam   Constitutional: He is oriented to person, place, and time. He appears well-developed and well-nourished. No distress.   On supplemental O2    +conversational dyspnea   HENT:   Head: Normocephalic and atraumatic.   Eyes: Pupils are equal, round, and reactive to light. Right eye exhibits no discharge. Left eye exhibits no discharge.   Neck: Neck supple. JVD present.   Cardiovascular: Normal rate, regular rhythm, S1 normal, S2 normal and normal heart sounds.   No murmur heard.  Pulmonary/Chest: Effort normal and breath sounds normal. No respiratory distress. He has no wheezes. He has no rales.   AICD site well-healed   Abdominal: Soft. He exhibits distension.   Musculoskeletal: He exhibits edema (BLE).   Neurological: He is alert and oriented to person, place, and time.   Skin: Skin is warm and dry. He is not diaphoretic. No erythema.   Psychiatric: He has a normal mood and affect. His behavior is normal. Thought content normal.   Nursing note and vitals reviewed.      Significant Labs:   CMP   Recent Labs   Lab 05/28/20  1601 05/29/20  0347    137   K 4.0 3.9   CL 99 99   CO2 26 30*   * 82   BUN 34* 36*   CREATININE 3.0* 3.0*   CALCIUM 9.2 8.7   PROT 6.9  --    ALBUMIN 3.0*  --    BILITOT 1.7*  --    ALKPHOS 91  --    AST 50*  --    ALT 25  --    ANIONGAP 11 8   ESTGFRAFRICA 24* 24*   EGFRNONAA 21* 21*   , CBC   Recent Labs   Lab 05/28/20  1601   WBC 5.74   HGB 12.8*   HCT 41.4   *   , Troponin   Recent Labs   Lab 05/28/20  1601   TROPONINI 0.061*    and All pertinent lab results from the last 24 hours have been reviewed.    Significant Imaging: Echocardiogram:   2D echo with color flow doppler:   Results for orders placed or performed during the hospital encounter of 04/02/19   2D echo with color flow doppler   Result Value Ref Range    QEF 15 (A) 55 - 65    Mitral Valve Regurgitation MILD     Aortic Valve Regurgitation MODERATE (A)     Est. PA Systolic Pressure  77.41 (A)     Tricuspid Valve Regurgitation MILD TO MODERATE     Narrative    Date of Procedure: 04/03/2019        TEST DESCRIPTION   Technical Quality: This is a portable study performed at the patient's bedside. This is a technically challenging study. There is poor endocardial definition.     General: A catheter is present in the right-sided cardiac chambers.     Aorta: The aortic root is normal in size, measuring 3.3 cm at sinotubular junction and 3.5 cm at Sinuses of Valsalva. The proximal ascending aorta is normal in size, measuring 3.4 cm across.     Left Atrium: The left atrial volume index is normal, measuring 33.52 cc/m2.     Left Ventricle: The left ventricle is moderately enlarged, with an end-diastolic diameter of 6.3 cm, and an end-systolic diameter of 6.1 cm. LV wall thickness is normal, with the septum and the posterior wall each measuring 1.0 cm across. Relative wall   thickness was normal at 0.32, and the LV mass index was increased at 163.8 g/m2 consistent with eccentric left ventricular hypertrophy. There are no regional wall motion abnormalities. Left ventricular systolic function appears severely depressed.   Visually estimated ejection fraction is 15-20%. The LV Doppler derived stroke volume equals 45.0 ccs.     Diastolic indices:     Right Atrium: The right atrium is normal in size, measuring 5.8 cm in length and 3.8 cm in width in the apical view.     Right Ventricle: The right ventricle is moderately enlarged in size. Global right ventricular systolic function appears moderately depressed. Tricuspid annular plane systolic excursion (TAPSE) is 1.1 cm. The estimated PA systolic pressure is 77 mmHg.     Aortic Valve:  The peak gradient obtained across the aortic valve is 5 mmHg, with a mean gradient of 3 mmHg. Using a left ventricular outflow tract diameter of 2.0 cm, a left ventricular outflow tract velocity time integral of 14 cm, and a peak   instantaneous transvalvular velocity time  integral of 19 cm, the calculated aortic valve area is 2.37 cm2(AVAi is 1.21 cm2/m2). Additionally, there is moderate aortic regurgitation. There is a pressure half time of 371.0 msec.     Mitral Valve:  There is mild mitral regurgitation.     Tricuspid Valve:  There is mild to moderate tricuspid regurgitation.     IVC: IVC is enlarged and collapses < 50% with a sniff, suggesting high right atrial pressure of 15 mmHg.     Atrial Septum: The atrial septum is intact.     Intracavitary: There is no evidence of pericardial effusion, intracavity mass, thrombi, or vegetation.         CONCLUSIONS     1 - Moderate left ventricular enlargement.     2 - Eccentric hypertrophy.     3 - No wall motion abnormalities.     4 - Severely depressed left ventricular systolic function (EF 15-20%).     5 - Right ventricular enlargement with moderately depressed systolic function.     6 - Pulmonary hypertension. The estimated PA systolic pressure is 77 mmHg.     7 - Moderate aortic regurgitation.     8 - Mild mitral regurgitation.     9 - Mild to moderate tricuspid regurgitation.     10 - Increased central venous pressure.             This document has been electronically    SIGNED BY: Zayra Mercer MD On: 04/03/2019 11:24   , EKG: Reviewed and X-Ray: CXR: X-Ray Chest 1 View (CXR): No results found for this visit on 05/28/20. and X-Ray Chest PA and Lateral (CXR): No results found for this visit on 05/28/20.

## 2020-05-29 NOTE — NURSING
Pt admitted to telemetry room 215 via stretcher from ER. Pt arrived on room air. Placed on tele monitor and verified with tele tech, VS taken and stable. Pt oriented to room and call light. Pt stood for standing weight and oxygen sats dropped to 70s with SOB. Placed on 3 LPM and pt recovered to 90s (see flowsheet). Will continue to monitor. Bed alarm on.

## 2020-05-29 NOTE — ASSESSMENT & PLAN NOTE
- Initiate CHF pathway   -Bumex 3 mg IV BID   -Start BB  -Continue Entresto   -Strict intake /Output   - Salt restriction 2 gram /day   -Fluid restriction 1.5 L/day   5/29- Metolazone added per Cardiology.  Assess response

## 2020-05-29 NOTE — NURSING
Patient ambulated with standby assistance from stretcher to bed. Vital signs obtained. Patient answered admit questions. Monitor number [id number] applied and verified. Will continue to monitor.

## 2020-05-29 NOTE — SUBJECTIVE & OBJECTIVE
Interval History:   · On O2 at 2L/min . SpO2 is satisfactory . Pt still c/o SOB and wheezing  and appears significant volume overloaded on physical exam . Denies chest pain.  Cardiology added Metolazone . Continue IV Bumex . CHF pathway initiated. Labs resulted Na 137, K 3.9, BUN 30, Creatinine 3.0 ( stable) , BNP on admit 3059. Echo resulted Left ventricular systolic function with EF  15%.Grade III (severe) left ventricular diastolic dysfunction , global hypokinesia and pul HTN with estimated PA pressure 63, mod to severe TR.         Review of Systems   Constitutional: Positive for activity change, appetite change, fatigue and unexpected weight change (wt gain). Negative for fever.   HENT: Negative for sore throat.    Eyes: Negative for visual disturbance.   Respiratory: Positive for shortness of breath. Negative for cough and chest tightness.    Cardiovascular: Positive for leg swelling. Negative for chest pain and palpitations.   Gastrointestinal: Negative for abdominal distention, abdominal pain, constipation, diarrhea, nausea and vomiting.   Endocrine: Negative for polyuria.   Genitourinary: Negative for decreased urine volume, dysuria, flank pain, frequency and hematuria.   Musculoskeletal: Negative for back pain and gait problem.   Skin: Negative for rash.   Neurological: Negative for syncope, speech difficulty, weakness, light-headedness and headaches.   Psychiatric/Behavioral: Negative for confusion, hallucinations and sleep disturbance.     Objective:     Vital Signs (Most Recent):  Temp: 97.3 °F (36.3 °C) (05/29/20 1249)  Pulse: 66 (05/29/20 1249)  Resp: 18 (05/29/20 1249)  BP: 104/68 (05/29/20 1249)  SpO2: 96 % (05/29/20 1249) Vital Signs (24h Range):  Temp:  [96.6 °F (35.9 °C)-98.5 °F (36.9 °C)] 97.3 °F (36.3 °C)  Pulse:  [] 66  Resp:  [14-24] 18  SpO2:  [78 %-100 %] 96 %  BP: (104-151)/(68-98) 104/68     Weight: 105.2 kg (232 lb)  Body mass index is 35.28 kg/m².    Intake/Output Summary (Last 24  hours) at 5/29/2020 1252  Last data filed at 5/29/2020 0600  Gross per 24 hour   Intake 240 ml   Output 300 ml   Net -60 ml      Physical Exam   Constitutional: He is oriented to person, place, and time. He appears well-developed. No distress.   HENT:   Head: Normocephalic and atraumatic.   Mouth/Throat: No oropharyngeal exudate.   Eyes: Pupils are equal, round, and reactive to light. Conjunctivae and EOM are normal.   Neck: Normal range of motion. Neck supple. No JVD present. No thyromegaly present.   Cardiovascular: Normal rate, regular rhythm and normal heart sounds.   No murmur heard.  Pulmonary/Chest: Effort normal. No respiratory distress. He has wheezes. He has rales. He exhibits no tenderness.   Abdominal: Soft. Bowel sounds are normal. He exhibits no distension. There is no tenderness. There is no rebound and no guarding.   Musculoskeletal: Normal range of motion. He exhibits edema (2+ pitting edema ).   Lymphadenopathy:     He has no cervical adenopathy.   Neurological: He is alert and oriented to person, place, and time. He displays normal reflexes. No cranial nerve deficit or sensory deficit.   Skin: Skin is warm and dry. No rash noted. He is not diaphoretic.   Psychiatric: He has a normal mood and affect.       Significant Labs:   BMP:   Recent Labs   Lab 05/28/20  1803 05/29/20  0347   GLU  --  82   NA  --  137   K  --  3.9   CL  --  99   CO2  --  30*   BUN  --  36*   CREATININE  --  3.0*   CALCIUM  --  8.7   MG 1.8  --      CBC:   Recent Labs   Lab 05/28/20  1601   WBC 5.74   HGB 12.8*   HCT 41.4   *     CMP:   Recent Labs   Lab 05/28/20  1601 05/29/20  0347    137   K 4.0 3.9   CL 99 99   CO2 26 30*   * 82   BUN 34* 36*   CREATININE 3.0* 3.0*   CALCIUM 9.2 8.7   PROT 6.9  --    ALBUMIN 3.0*  --    BILITOT 1.7*  --    ALKPHOS 91  --    AST 50*  --    ALT 25  --    ANIONGAP 11 8   EGFRNONAA 21* 21*       Significant Imaging:

## 2020-05-29 NOTE — ASSESSMENT & PLAN NOTE
- continue basal  Insulin and meal time bolus per sliding scale   -HgA1c 6.0 ( 8 mos ago) suggestive of good out patient control

## 2020-05-29 NOTE — PROGRESS NOTES
[facility] Hospital Medicine  Progress Note    Patient Name: Yan Choudhury  MRN: [id number]  Patient Class: IP- Inpatient   Admission Date: 5/28/2020  Length of Stay: 0 days  Attending Physician: Cleve Garcia MD  Primary Care Provider: Sandra Estevez MD        Subjective:     Principal Problem:Acute on chronic systolic heart failure        HPI:  Yan Choudhury is a 65 y.o. male patient with a PMHx of type 2 DM, HLD, HTN, obesity, CAD, ischemic cardiomyopathy, diverticulitis of colon, CKD Stage 3,  CHF,  PPM, CAD, and JAHAIRA on CPAP and PSHx of cardiac catheterization who presents to the Emergency Department for evaluation of SOB which onset gradually x2 weeks ago . Pt states he has gained 10 lbs in x1 week. Symptoms are worsening and moderate in severity. No mitigating or exacerbating factors reported. Associated sx includes BLE edema. Patient denies any fever, chills, numbness, weakness, CP, smoking, cough, palpitations, and all other sxs at this time. Pt states he is compliant with his fluid pill. No further complaints or concerns at this time.  Pt reports compliance with prescribed medications.  Pt reports increased sodium consumption (crawfish) and denies increased fluid intake.  Pt denies smoking and use of ETOH.  Pt is followed outpatient by Dr. Estevez (PCP) and Dr. Calvillo (Cardiology).  Pt is a full code and Elsie Choudhury (wife) is the surrogate decision maker.  ER work up showed: INR 1.6, BUN/Creatinine 34/3, Glucose 111, T. Bili 1.7, AST 50, BNP 3059, Troponin 0.061, , and pO2 53.  Chest xray showed no acute process.  EKG showed SR with fusion complexes, L axis deviation, and nonspecific intraventricular block with HR 98.  Hospital Medicine contacted for admission with patient placed in Observation Unit for further evaluation.     Overview/Hospital Course:  · 5/29- On O2 at 2L/min . SpO2 is satisfactory . Pt still c/o SOB and wheezing  and appears significant volume overloaded on physical exam .  Denies chest pain.  Cardiology added Metolazone . Continue IV Bumex . CHF pathway initiated. Labs resulted Na 137, K 3.9, BUN 30, Creatinine 3.0 ( stable) , BNP on admit 3059. Echo resulted Left ventricular systolic function with EF  15%.Grade III (severe) left ventricular diastolic dysfunction , global hypokinesia and pul HTN with estimated PA pressure 63, mod to severe TR.       Interval History:   · On O2 at 2L/min . SpO2 is satisfactory . Pt still c/o SOB and wheezing  and appears significant volume overloaded on physical exam . Denies chest pain.  Cardiology added Metolazone . Continue IV Bumex . CHF pathway initiated. Labs resulted Na 137, K 3.9, BUN 30, Creatinine 3.0 ( stable) , BNP on admit 3059. Echo resulted Left ventricular systolic function with EF  15%.Grade III (severe) left ventricular diastolic dysfunction , global hypokinesia and pul HTN with estimated PA pressure 63, mod to severe TR.         Review of Systems   Constitutional: Positive for activity change, appetite change, fatigue and unexpected weight change (wt gain). Negative for fever.   HENT: Negative for sore throat.    Eyes: Negative for visual disturbance.   Respiratory: Positive for shortness of breath. Negative for cough and chest tightness.    Cardiovascular: Positive for leg swelling. Negative for chest pain and palpitations.   Gastrointestinal: Negative for abdominal distention, abdominal pain, constipation, diarrhea, nausea and vomiting.   Endocrine: Negative for polyuria.   Genitourinary: Negative for decreased urine volume, dysuria, flank pain, frequency and hematuria.   Musculoskeletal: Negative for back pain and gait problem.   Skin: Negative for rash.   Neurological: Negative for syncope, speech difficulty, weakness, light-headedness and headaches.   Psychiatric/Behavioral: Negative for confusion, hallucinations and sleep disturbance.     Objective:     Vital Signs (Most Recent):  Temp: 97.3 °F (36.3 °C) (05/29/20 1249)  Pulse:  66 (05/29/20 1249)  Resp: 18 (05/29/20 1249)  BP: 104/68 (05/29/20 1249)  SpO2: 96 % (05/29/20 1249) Vital Signs (24h Range):  Temp:  [96.6 °F (35.9 °C)-98.5 °F (36.9 °C)] 97.3 °F (36.3 °C)  Pulse:  [] 66  Resp:  [14-24] 18  SpO2:  [78 %-100 %] 96 %  BP: (104-151)/(68-98) 104/68     Weight: 105.2 kg (232 lb)  Body mass index is 35.28 kg/m².    Intake/Output Summary (Last 24 hours) at 5/29/2020 1252  Last data filed at 5/29/2020 0600  Gross per 24 hour   Intake 240 ml   Output 300 ml   Net -60 ml      Physical Exam   Constitutional: He is oriented to person, place, and time. He appears well-developed. No distress.   HENT:   Head: Normocephalic and atraumatic.   Mouth/Throat: No oropharyngeal exudate.   Eyes: Pupils are equal, round, and reactive to light. Conjunctivae and EOM are normal.   Neck: Normal range of motion. Neck supple. No JVD present. No thyromegaly present.   Cardiovascular: Normal rate, regular rhythm and normal heart sounds.   No murmur heard.  Pulmonary/Chest: Effort normal. No respiratory distress. He has wheezes. He has rales. He exhibits no tenderness.   Abdominal: Soft. Bowel sounds are normal. He exhibits no distension. There is no tenderness. There is no rebound and no guarding.   Musculoskeletal: Normal range of motion. He exhibits edema (2+ pitting edema ).   Lymphadenopathy:     He has no cervical adenopathy.   Neurological: He is alert and oriented to person, place, and time. He displays normal reflexes. No cranial nerve deficit or sensory deficit.   Skin: Skin is warm and dry. No rash noted. He is not diaphoretic.   Psychiatric: He has a normal mood and affect.       Significant Labs:   BMP:   Recent Labs   Lab 05/28/20  1803 05/29/20  0347   GLU  --  82   NA  --  137   K  --  3.9   CL  --  99   CO2  --  30*   BUN  --  36*   CREATININE  --  3.0*   CALCIUM  --  8.7   MG 1.8  --      CBC:   Recent Labs   Lab 05/28/20  1601   WBC 5.74   HGB 12.8*   HCT 41.4   *     CMP:   Recent  Labs   Lab 05/28/20  1601 05/29/20  0347    137   K 4.0 3.9   CL 99 99   CO2 26 30*   * 82   BUN 34* 36*   CREATININE 3.0* 3.0*   CALCIUM 9.2 8.7   PROT 6.9  --    ALBUMIN 3.0*  --    BILITOT 1.7*  --    ALKPHOS 91  --    AST 50*  --    ALT 25  --    ANIONGAP 11 8   EGFRNONAA 21* 21*       Significant Imaging:       Assessment/Plan:      * Acute on chronic combined systolic and diastolic  heart failure with EF 15-20%  - Initiate CHF pathway   -Bumex 3 mg IV BID   -Start BB  -Continue Entresto   -Strict intake /Output   - Salt restriction 2 gram /day   -Fluid restriction 1.5 L/day   5/29- Metolazone added per Cardiology.  Assess response       Biventricular ICD (implantable cardioverter-defibrillator) in place  - S/P CRT-ICD  - H/O ischemic Cardiomyopathy , EF 15 to 20%       Pulmonary HTN  - Continue Diuresis   - supplemental O2 to keep O2 sat 92% or higher   - Home O2 eval pending clinical course   -H/O Sleep Apnea       CKD (chronic kidney disease) stage 4, GFR 15-29 ml/min  - Monitor renal indices while on aggressive diuretic  regimen       Unspecified essential hypertension  - Resume Home med and adjust dose accordingly       Type 2 diabetes mellitus, with long-term current use of insulin  - continue basal  Insulin and meal time bolus per sliding scale   -HgA1c 6.0 ( 8 mos ago) suggestive of good out patient control       Elevated LFTs  - Likely hepatic congestion due to acute on chronic CHF( HFrEF)   -Expect to improve with diuresis         VTE Risk Mitigation (From admission, onward)         Ordered     enoxaparin injection 30 mg  Daily      05/28/20 9840                      Cleve Garcia MD  Department of Hospital Medicine   [facility]

## 2020-05-29 NOTE — CONSULTS
Food & Nutrition  Education    Diet Education: Salt and Fluid Restriction   Time Spent: 15 min  Learners: Patient      Nutrition Education provided with handouts: Heart Failure Nutrition Therapy from Nutrition Care Manual       Comments: Patient states he is used to eating many meals out. Educated patient on ways to reduce sodium in his diet including cookin gmore meals at home, eliminating salt at meal times, and reducing processed food intake. Discussed the importance of limiting fluid intake and taking daily  weights.       All questions and concerns answered. Dietitian's contact information provided.       Follow-Up: Yes    Please Re-consult as needed    Thanks!    Shanelle Dick, RD, LDN

## 2020-05-29 NOTE — PLAN OF CARE
Dx: acute on chronic CHF with EF 15-20%    POC:monitor VS, Bed alarm and [employer] video monitor d/t pt being a high fall risk with unsteady gait, strict I&O    Bed remains low and locked, side rails up x 2, call bell and belongings within reach.    Clutter removed from room, lighting adjusted when rounding.    Frequent repositioning encouraged to prevent pressure injury.    Blood glucose checked before meals and at bedtime, insulin administered as ordered when trays on unit.    Pain frequently monitored, interventions implemented as ordered and appropriate.    VS taken Q4 hours per unit routine.     Cardiac monitor in place: paced.    Educated pt on plan of care. Pt is agreeable and verbalized understanding. Will continue to monitor.

## 2020-05-29 NOTE — PLAN OF CARE
GEORGETTE spoke with patient wife by phone to assess for discharge planning and obtained the following information:    On 2/15/2020, the patient came to the ED for similar issues that led to his current issues.  Patient lives at home with his wife, who is the patient's identified help at home and helps to manage his care.  Patient was utilizing oxygen and a quad cane before coming to the hospital, but denied the use of any other medical/respiratory assistive equipment or home health services.  Patient's wife stated that she does not feel that the patient has any discharge needs at this time.  However, discharge needs are still undetermined.  SW provided a transitional care folder, information on advanced directives, information on pharmacy bedside delivery, and discharge planning begins on admission with contact information for any needs/questions.     D/C Plan:  Home  PCP:  Dr. Octavio Estevez  Preferred Pharmacy:    [facility]/pharmacy #[id number] - [place], [place] - [address]  [address] [address]  Phone: [phone] Fax: [phone]    Discharge transportation:  Son  My [facility]:  Pending  Pharmacy Bedside Delivery:  Declined       05/29/20 0944   Discharge Assessment   Assessment Type Discharge Planning Assessment   Confirmed/corrected address and phone number on facesheet? Yes   Assessment information obtained from? Patient   Expected Length of Stay (days)   (TBD)   Communicated expected length of stay with patient/caregiver yes   Prior to hospitilization cognitive status: Alert/Oriented   Prior to hospitalization functional status: Independent;Assistive Equipment   Current cognitive status: Alert/Oriented   Current Functional Status: Independent;Assistive Equipment   Facility Arrived From: Home   Lives With spouse   Able to Return to Prior Arrangements yes   Is patient able to care for self after discharge? Yes   Who are your caregiver(s) and their phone number(s)? Jessica Choudhury  (spouse) [phone]   Patient's perception of discharge disposition home or selfcare   Readmission Within the Last 30 Days no previous admission in last 30 days   Patient currently being followed by outpatient case management? No   Patient currently receives any other outside agency services? No   Equipment Currently Used at Home oxygen;cane, quad   Do you have any problems affording any of your prescribed medications? No   Is the patient taking medications as prescribed? yes   Does the patient have transportation home? Yes   Transportation Anticipated family or friend will provide  (son; Timothy Choudhury will transport the patient home.)   Dialysis Name and Scheduled days N/A   Does the patient receive services at the [facility]? No   Discharge Plan A Home with family   DME Needed Upon Discharge  none   Patient/Family in Agreement with Plan yes

## 2020-05-29 NOTE — NURSING
Metolazone not given this morning to [facility] d/t BP. Message sent to Jovana PATEL regarding current QXH=[id number]. She requested to hold the dose now and give 30 min prior to next bumex tonight. Rescheduled dose for 20:30. She stated it should be given in conjunction with the bumex to improve diuresing.

## 2020-05-29 NOTE — HOSPITAL COURSE
· 5/29- On O2 at 2L/min . SpO2 is satisfactory . Pt still c/o SOB and wheezing  and appears significant volume overloaded on physical exam . Denies chest pain.  Cardiology added Metolazone . Continue IV Bumex . CHF pathway initiated. Labs resulted Na 137, K 3.9, BUN 30, Creatinine 3.0 ( stable) , BNP on admit 3059. Echo resulted Left ventricular systolic function with EF  15%.Grade III (severe) left ventricular diastolic dysfunction , global hypokinesia and pul HTN with estimated PA pressure 63, mod to severe TR.   · 5/30: c/o sob and leg swelling. Continue iv bumex and metolazone. Echo reviewed. Discussed palliative option. Patient also c/o diarrhea. No abx given during hospitalization. tx sx and obtain stool cx  · 5/31: denies CP, SOB. Reports leg swelling same. Diarrhea resolved. Palliative on board. Will ask to revisit hospice care  6/1: denies sx of CP, SOB. Leg swelling improved but chronic. Lives at home with wife. Cards on board, possibly transition to PO diuretic tomorrow  6/2: patient seen and evaluated by me. Patient deemed stable for discharge. Patient less dyspneic, on RA. Transitioning to PO diuretic. Receiving breathing treatment. Updated wife regarding discharge plan. Discussed need to decide goals of care. Wife voiced understanding. Discharged home on BB, metolazone, and decreased insulin. Patient to f/u outpt for monitoring

## 2020-05-29 NOTE — NURSING
Chart reviewed for diabetes  Patient has been seen by this nurse on previous admit  A1C 6.3   No further action required

## 2020-05-29 NOTE — ASSESSMENT & PLAN NOTE
- Likely hepatic congestion due to acute on chronic CHF( HFrEF)   -Expect to improve with diuresis

## 2020-05-29 NOTE — H&P
[facility] Hospital Medicine  History & Physical    Patient Name: Yan Choudhury  MRN: [id number]  Admission Date: 5/28/2020  Attending Physician: Cleve Garcia MD   Primary Care Provider: Sandra Estevez MD         Patient information was obtained from patient and ER records.     Subjective:     Principal Problem:Acute on chronic systolic heart failure    Chief Complaint:   Chief Complaint   Patient presents with    Shortness of Breath     pt reports SOB x 2 weeks that is worsening; SOB worse with exertion, denies CP        HPI: Yan Choudhury is a 65 y.o. male patient with a PMHx of type 2 DM, HLD, HTN, obesity, CAD, ischemic cardiomyopathy, diverticulitis of colon, CKD Stage 3,  CHF,  PPM, CAD, and JAHAIRA on CPAP and PSHx of cardiac catheterization who presents to the Emergency Department for evaluation of SOB which onset gradually x2 weeks ago . Pt states he has gained 10 lbs in x1 week. Symptoms are worsening and moderate in severity. No mitigating or exacerbating factors reported. Associated sx includes BLE edema. Patient denies any fever, chills, numbness, weakness, CP, smoking, cough, palpitations, and all other sxs at this time. Pt states he is compliant with his fluid pill. No further complaints or concerns at this time.  Pt reports compliance with prescribed medications.  Pt reports increased sodium consumption (crawfish) and denies increased fluid intake.  Pt denies smoking and use of ETOH.  Pt is followed outpatient by Dr. Estevez (PCP) and Dr. Calvillo (Cardiology).  Pt is a full code and Elsie Choudhury (wife) is the surrogate decision maker.  ER work up showed: INR 1.6, BUN/Creatinine 34/3, Glucose 111, T. Bili 1.7, AST 50, BNP 3059, Troponin 0.061, , and pO2 53.  Chest xray showed no acute process.  EKG showed SR with fusion complexes, L axis deviation, and nonspecific intraventricular block with HR 98.  Hospital Medicine contacted for admission with patient placed in Observation Unit for  further evaluation.     Past Medical History:   Diagnosis Date    Arthritis     CAD (coronary artery disease)     Cataract     Chronic combined systolic and diastolic congestive heart failure 3/24/2015    CKD (chronic kidney disease), stage III     Diabetes mellitus type II      AM    Diabetic retinopathy     anti Veg F injections for macular edema    Diverticulitis of colon     ED (erectile dysfunction)     Encounter for blood transfusion     Glaucoma     HTN (hypertension)     Hyperlipidemia     Ischemic cardiomyopathy     Obesity     JAHAIRA on CPAP     Pacemaker     Pulmonary HTN        Past Surgical History:   Procedure Laterality Date    CARDIAC CATHETERIZATION  2009    CHOLECYSTECTOMY      COLON SURGERY      Sigmoid resection    COLONOSCOPY N/A 10/11/2018    Procedure: COLONOSCOPY;  Surgeon: Quinn Aragon MD;  Location: [facility] ENDO;  Service: General;  Laterality: N/A;    EYE SURGERY      FLEXIBLE SIGMOIDOSCOPY N/A 1/18/2019    Procedure: SIGMOIDOSCOPY, FLEXIBLE;  Surgeon: Quinn Aragon MD;  Location: [facility] ENDO;  Service: General;  Laterality: N/A;    ILEOSTOMY N/A 10/30/2018    Procedure: CREATION, ILEOSTOMY;  Surgeon: Kevin Lindsay MD;  Location: [facility] OR;  Service: General;  Laterality: N/A;    Ileostomy reversal  01/2019    KNEE SURGERY      MOBILIZATION OF SPLENIC FLEXURE N/A 10/30/2018    Procedure: MOBILIZATION, SPLENIC FLEXURE;  Surgeon: Kevin Lindsay MD;  Location: [facility] OR;  Service: General;  Laterality: N/A;    REVISION COLOSTOMY N/A 10/30/2018    Procedure: REVISION OR CLOSURE, COLOSTOMY;  Surgeon: Kevin Lindsay MD;  Location: [facility] OR;  Service: General;  Laterality: N/A;  Parastomal Hernia Repair    RIGHT HEMICOLECTOMY Right 10/30/2018    Procedure: HEMICOLECTOMY, RIGHT;  Surgeon: Kevin Lindsay MD;  Location: [facility] OR;  Service: General;  Laterality: Right;       Review of patient's allergies indicates:  No Known Allergies    No current  facility-administered medications on file prior to encounter.      Current Outpatient Medications on File Prior to Encounter   Medication Sig    allopurinoL (ZYLOPRIM) 100 MG tablet Take 1 tablet (100 mg total) by mouth once daily.    aspirin (ECOTRIN) 81 MG EC tablet Take 81 mg by mouth every morning.     bumetanide (BUMEX) 2 MG tablet TAKE 1 TABLET (2 MG TOTAL) BY MOUTH three times a day    cholecalciferol, vitamin D3, (VITAMIN D3) 1,000 unit capsule Take 1,000 Units by mouth once daily.    ENTRESTO 24-26 mg per tablet TAKE 1 TABLET BY MOUTH TWICE A DAY    insulin glargine (LANTUS U-100 INSULIN) 100 unit/mL injection INJECT 50 UNITS TWICE DAILY AS NEEDED WHEN BS< 150    IRON/VITAMIN B COMPLEX (GERITOL ORAL) Take by mouth.    multivit-min/folic/vit K/lycop (MEN'S 50 PLUS MULTIVITAMIN ORAL) Take by mouth.    simvastatin (ZOCOR) 10 MG tablet Take 1 tablet (10 mg total) by mouth every evening.    albuterol (PROVENTIL/VENTOLIN HFA) 90 mcg/actuation inhaler Inhale 2 puffs into the lungs every 6 (six) hours as needed for Wheezing. Rescue    BD INSULIN SYRINGE ULTRA-FINE 0.5 mL 31 gauge x 5/16 Syrg USE AS DIRECTED TO INJECT INSULIN TWO TIMES DAILY    bimatoprost (LUMIGAN) 0.03 % ophthalmic drops Place 1 drop into the left eye every evening.    blood sugar diagnostic Strp 1 strip by Misc.(Non-Drug; Combo Route) route 4 (four) times daily. [employer]care    brimonidine-timolol (COMBIGAN) 0.2-0.5 % Drop Place 1 drop into both eyes every 12 (twelve) hours.    dorzolamide-timolol 2-0.5% (COSOPT) 22.3-6.8 mg/mL ophthalmic solution Place 1 drop into both eyes 2 (two) times daily.    ketoconazole (NIZORAL) 2 % cream AAA bid prn rash of groin. For Flares.    lancets Misc For tel care    latanoprost (XALATAN) 0.005 % ophthalmic solution Place 1 drop into both eyes once daily.    pantoprazole (PROTONIX) 40 MG tablet TAKE 1 TABLET BY MOUTH EVERY DAY FOR ACID REFLUX    pen needle, diabetic (BD ULTRA-FINE JOSLYN PEN NEEDLE)  "32 gauge x 5/32" Ndle 1 each by Misc.(Non-Drug; Combo Route) route 4 (four) times daily.     Family History     Problem Relation (Age of Onset)    Cancer Mother    Heart disease Father    No Known Problems Sister, Brother, Maternal Aunt, Maternal Uncle, Paternal Aunt, Paternal Uncle, Maternal Grandmother, Maternal Grandfather, Paternal Grandmother, Paternal Grandfather    Stroke Father        Tobacco Use    Smoking status: Never Smoker    Smokeless tobacco: Never Used   Substance and Sexual Activity    Alcohol use: No     Alcohol/week: 0.0 standard drinks     Frequency: Never    Drug use: No    Sexual activity: Not Currently     Review of Systems   Constitutional: Positive for activity change. Negative for appetite change, fatigue and fever.   HENT: Negative for congestion, postnasal drip, sinus pressure, sore throat and trouble swallowing.    Eyes: Negative.    Respiratory: Positive for shortness of breath. Negative for cough and wheezing.    Cardiovascular: Positive for leg swelling. Negative for chest pain and palpitations.   Gastrointestinal: Negative for abdominal distention, abdominal pain, diarrhea, nausea and vomiting.   Endocrine: Negative for cold intolerance and heat intolerance.   Genitourinary: Negative for difficulty urinating, dysuria, flank pain, frequency and urgency.   Musculoskeletal: Negative for arthralgias, back pain and myalgias.   Skin: Negative for color change and wound.   Allergic/Immunologic: Negative for environmental allergies and food allergies.   Neurological: Negative for dizziness, weakness and headaches.   Hematological: Does not bruise/bleed easily.   Psychiatric/Behavioral: Negative for agitation and confusion. The patient is not nervous/anxious.      Objective:     Vital Signs (Most Recent):  Temp: 98.3 °F (36.8 °C) (05/28/20 1737)  Pulse: 101 (05/28/20 1740)  Resp: 18 (05/28/20 1703)  BP: 134/81 (05/28/20 1702)  SpO2: 100 % (05/28/20 1740) Vital Signs (24h Range):  Temp:  " [97.7 °F (36.5 °C)-98.3 °F (36.8 °C)] 98.3 °F (36.8 °C)  Pulse:  [] 101  Resp:  [14-24] 18  SpO2:  [96 %-100 %] 100 %  BP: (134-151)/(81-98) 134/81     Weight: 106.5 kg (234 lb 11.2 oz)  Body mass index is 35.69 kg/m².    Physical Exam   Constitutional: He is oriented to person, place, and time. He appears well-developed and well-nourished.   HENT:   Head: Normocephalic.   Nose: Nose normal.   Eyes:   R eye deformity s/p accident in 1950    Neck: JVD present.   Cardiovascular: Normal rate, regular rhythm and normal heart sounds.   Pulmonary/Chest: He has decreased breath sounds in the right lower field and the left lower field. He has no wheezes. He has rales.   Abdominal: Soft. Bowel sounds are normal. He exhibits distension. There is no tenderness.   Genitourinary:   Genitourinary Comments: Deferred    Musculoskeletal: He exhibits edema (BLE- 2+/3+ edema ). He exhibits no tenderness.   Neurological: He is alert and oriented to person, place, and time.   Altered speech pattern (chronic)    Skin: Skin is warm and dry.   Psychiatric: He has a normal mood and affect. His behavior is normal.           Significant Labs:   ABGs:   Recent Labs   Lab 05/28/20  1612   PH 7.384   PCO2 46.7*   HCO3 27.9   POCSATURATED 86*   BE 3     CBC:   Recent Labs   Lab 05/28/20  1601   WBC 5.74   HGB 12.8*   HCT 41.4   *     CMP:   Recent Labs   Lab 05/28/20  1601      K 4.0   CL 99   CO2 26   *   BUN 34*   CREATININE 3.0*   CALCIUM 9.2   PROT 6.9   ALBUMIN 3.0*   BILITOT 1.7*   ALKPHOS 91   AST 50*   ALT 25   ANIONGAP 11   EGFRNONAA 21*     Troponin:   Recent Labs   Lab 05/28/20  1601   TROPONINI 0.061*       Significant Imaging:   Imaging Results          X-Ray Chest AP Portable (Final result)  Result time 05/28/20 16:00:55    Final result by Asif Isaacs MD (05/28/20 16:00:55)                 Impression:      No acute radiographic abnormality in the chest.      Electronically signed by: Asif  Ferny  Date:    05/28/2020  Time:    16:00             Narrative:    EXAMINATION:  XR CHEST AP PORTABLE    CLINICAL HISTORY:  shortness of breath;    TECHNIQUE:  Single frontal view of the chest was performed.    COMPARISON:  Chest radiograph 05/15/2020 with priors    FINDINGS:  Left-sided AICD with transvenous leads in stable position.  Cardiomediastinal silhouette is unchanged and enlarged.  Aortic atherosclerotic calcifications.  Lungs are symmetrically expanded.  No acute or new airspace consolidative opacity.  No large effusion.  No pneumothorax.  Visualized osseous structures appear intact.                              Assessment/Plan:     * Acute on chronic systolic heart failure with EF 15-20%  - Initiate CHF pathway   -Start BB  -Continue Entresto   -Strict intake /Output   - Salt restriction 2 gram /day   -Fluid restriction 1.5 L/day       Biventricular ICD (implantable cardioverter-defibrillator) in place  - S/P CRT-ICD  - H/O ischemic Cardiomyopathy , EF 15 to 20%       Pulmonary HTN  - Continue Diuresis   - supplemental O2 to keep O2 sat 92% or higher   - Home O2 eval pending clinical course   -H/O Sleep Apnea       CKD (chronic kidney disease) stage 4, GFR 15-29 ml/min  - Monitor renal indices while on aggressive diuresis regimen       Unspecified essential hypertension  - Resume Home med and adjust dose accordingly       Type 2 diabetes mellitus, with long-term current use of insulin  - continue basal  Insulin and meal time bolus per sliding scale   -HgA1c 6.0 ( 8 mos ago) suggestive of good out patient control       Elevated LFTs  - Likely hepatic congestion due to acute on chronic CHF( HFrEF)   -Expect to improve with diuresis         VTE Risk Mitigation (From admission, onward)         Ordered     enoxaparin injection 30 mg  Daily      05/28/20 4310                   Cleve Garcia MD  Department of Hospital Medicine   [facility]

## 2020-05-29 NOTE — ASSESSMENT & PLAN NOTE
-Presented with decompensated combined CHF, in part due to dietary indiscretion  -Continue IV diuresis  -Add 5 mg of metolazone today and assess response  -Consider Lasix gtt if no improvement  -Continue Entresto, Coreg  -Repeat echo pending  -MPI stress test 6/19 negative for ischemia, + fixed defects

## 2020-05-30 LAB
ALBUMIN SERPL BCP-MCNC: 3.1 G/DL (ref 3.5–5.2)
ALP SERPL-CCNC: 98 U/L (ref 55–135)
ALT SERPL W/O P-5'-P-CCNC: 23 U/L (ref 10–44)
ANION GAP SERPL CALC-SCNC: 12 MMOL/L (ref 8–16)
ANISOCYTOSIS BLD QL SMEAR: ABNORMAL
AST SERPL-CCNC: 44 U/L (ref 10–40)
BASOPHILS # BLD AUTO: 0.04 K/UL (ref 0–0.2)
BASOPHILS NFR BLD: 0.7 % (ref 0–1.9)
BILIRUB DIRECT SERPL-MCNC: 0.7 MG/DL (ref 0.1–0.3)
BILIRUB SERPL-MCNC: 1.1 MG/DL (ref 0.1–1)
BUN SERPL-MCNC: 37 MG/DL (ref 8–23)
CALCIUM SERPL-MCNC: 9.1 MG/DL (ref 8.7–10.5)
CHLORIDE SERPL-SCNC: 99 MMOL/L (ref 95–110)
CO2 SERPL-SCNC: 26 MMOL/L (ref 23–29)
CREAT SERPL-MCNC: 3 MG/DL (ref 0.5–1.4)
DACRYOCYTES BLD QL SMEAR: ABNORMAL
DIFFERENTIAL METHOD: ABNORMAL
EOSINOPHIL # BLD AUTO: 0.2 K/UL (ref 0–0.5)
EOSINOPHIL NFR BLD: 3.6 % (ref 0–8)
ERYTHROCYTE [DISTWIDTH] IN BLOOD BY AUTOMATED COUNT: 20.2 % (ref 11.5–14.5)
EST. GFR  (AFRICAN AMERICAN): 24 ML/MIN/1.73 M^2
EST. GFR  (NON AFRICAN AMERICAN): 21 ML/MIN/1.73 M^2
GLUCOSE SERPL-MCNC: 69 MG/DL (ref 70–110)
HCT VFR BLD AUTO: 42 % (ref 40–54)
HGB BLD-MCNC: 12.8 G/DL (ref 14–18)
HYPOCHROMIA BLD QL SMEAR: ABNORMAL
IMM GRANULOCYTES # BLD AUTO: 0.01 K/UL (ref 0–0.04)
IMM GRANULOCYTES NFR BLD AUTO: 0.2 % (ref 0–0.5)
LYMPHOCYTES # BLD AUTO: 1.7 K/UL (ref 1–4.8)
LYMPHOCYTES NFR BLD: 31.8 % (ref 18–48)
MAGNESIUM SERPL-MCNC: 1.7 MG/DL (ref 1.6–2.6)
MCH RBC QN AUTO: 24.6 PG (ref 27–31)
MCHC RBC AUTO-ENTMCNC: 30.5 G/DL (ref 32–36)
MCV RBC AUTO: 81 FL (ref 82–98)
MONOCYTES # BLD AUTO: 0.7 K/UL (ref 0.3–1)
MONOCYTES NFR BLD: 13.5 % (ref 4–15)
NEUTROPHILS # BLD AUTO: 2.7 K/UL (ref 1.8–7.7)
NEUTROPHILS NFR BLD: 50.2 % (ref 38–73)
NRBC BLD-RTO: 0 /100 WBC
OVALOCYTES BLD QL SMEAR: ABNORMAL
PHOSPHATE SERPL-MCNC: 3.6 MG/DL (ref 2.7–4.5)
PLATELET # BLD AUTO: 170 K/UL (ref 150–350)
PLATELET BLD QL SMEAR: ABNORMAL
PMV BLD AUTO: ABNORMAL FL (ref 9.2–12.9)
POCT GLUCOSE: 127 MG/DL (ref 70–110)
POCT GLUCOSE: 150 MG/DL (ref 70–110)
POCT GLUCOSE: 160 MG/DL (ref 70–110)
POCT GLUCOSE: 75 MG/DL (ref 70–110)
POIKILOCYTOSIS BLD QL SMEAR: SLIGHT
POLYCHROMASIA BLD QL SMEAR: ABNORMAL
POTASSIUM SERPL-SCNC: 4.1 MMOL/L (ref 3.5–5.1)
PROT SERPL-MCNC: 7 G/DL (ref 6–8.4)
RBC # BLD AUTO: 5.2 M/UL (ref 4.6–6.2)
SCHISTOCYTES BLD QL SMEAR: PRESENT
SODIUM SERPL-SCNC: 137 MMOL/L (ref 136–145)
STOMATOCYTES BLD QL SMEAR: PRESENT
TARGETS BLD QL SMEAR: ABNORMAL
WBC # BLD AUTO: 5.34 K/UL (ref 3.9–12.7)

## 2020-05-30 PROCEDURE — 99232 SBSQ HOSP IP/OBS MODERATE 35: CPT | Mod: ,,, | Performed by: INTERNAL MEDICINE

## 2020-05-30 PROCEDURE — 25000003 PHARM REV CODE 250: Performed by: INTERNAL MEDICINE

## 2020-05-30 PROCEDURE — 25000003 PHARM REV CODE 250: Performed by: NURSE PRACTITIONER

## 2020-05-30 PROCEDURE — 83735 ASSAY OF MAGNESIUM: CPT

## 2020-05-30 PROCEDURE — 94640 AIRWAY INHALATION TREATMENT: CPT

## 2020-05-30 PROCEDURE — 80076 HEPATIC FUNCTION PANEL: CPT

## 2020-05-30 PROCEDURE — 27000221 HC OXYGEN, UP TO 24 HOURS

## 2020-05-30 PROCEDURE — 99232 PR SUBSEQUENT HOSPITAL CARE,LEVL II: ICD-10-PCS | Mod: ,,, | Performed by: INTERNAL MEDICINE

## 2020-05-30 PROCEDURE — 87427 SHIGA-LIKE TOXIN AG IA: CPT | Mod: 59

## 2020-05-30 PROCEDURE — 25000003 PHARM REV CODE 250: Performed by: PHYSICIAN ASSISTANT

## 2020-05-30 PROCEDURE — 63600175 PHARM REV CODE 636 W HCPCS: Performed by: INTERNAL MEDICINE

## 2020-05-30 PROCEDURE — S0171 BUMETANIDE 0.5 MG: HCPCS | Performed by: INTERNAL MEDICINE

## 2020-05-30 PROCEDURE — 80048 BASIC METABOLIC PNL TOTAL CA: CPT

## 2020-05-30 PROCEDURE — 21400001 HC TELEMETRY ROOM

## 2020-05-30 PROCEDURE — 85025 COMPLETE CBC W/AUTO DIFF WBC: CPT

## 2020-05-30 PROCEDURE — 36415 COLL VENOUS BLD VENIPUNCTURE: CPT

## 2020-05-30 PROCEDURE — 87045 FECES CULTURE AEROBIC BACT: CPT

## 2020-05-30 PROCEDURE — 25000242 PHARM REV CODE 250 ALT 637 W/ HCPCS: Performed by: INTERNAL MEDICINE

## 2020-05-30 PROCEDURE — 94761 N-INVAS EAR/PLS OXIMETRY MLT: CPT

## 2020-05-30 PROCEDURE — 99900035 HC TECH TIME PER 15 MIN (STAT)

## 2020-05-30 PROCEDURE — 84100 ASSAY OF PHOSPHORUS: CPT

## 2020-05-30 PROCEDURE — 87046 STOOL CULTR AEROBIC BACT EA: CPT | Mod: 59

## 2020-05-30 PROCEDURE — 96372 THER/PROPH/DIAG INJ SC/IM: CPT

## 2020-05-30 PROCEDURE — 25000003 PHARM REV CODE 250: Performed by: FAMILY MEDICINE

## 2020-05-30 RX ORDER — LOPERAMIDE HYDROCHLORIDE 2 MG/1
2 CAPSULE ORAL 4 TIMES DAILY PRN
Status: DISCONTINUED | OUTPATIENT
Start: 2020-05-30 | End: 2020-06-02 | Stop reason: HOSPADM

## 2020-05-30 RX ORDER — DIPHENHYDRAMINE HCL 25 MG
25 CAPSULE ORAL EVERY 6 HOURS PRN
Status: DISCONTINUED | OUTPATIENT
Start: 2020-05-30 | End: 2020-06-02 | Stop reason: HOSPADM

## 2020-05-30 RX ADMIN — CARVEDILOL 3.12 MG: 3.12 TABLET, FILM COATED ORAL at 04:05

## 2020-05-30 RX ADMIN — SACUBITRIL AND VALSARTAN 1 TABLET: 24; 26 TABLET, FILM COATED ORAL at 09:05

## 2020-05-30 RX ADMIN — INSULIN DETEMIR 20 UNITS: 100 INJECTION, SOLUTION SUBCUTANEOUS at 09:05

## 2020-05-30 RX ADMIN — LOPERAMIDE HYDROCHLORIDE 2 MG: 2 CAPSULE ORAL at 08:05

## 2020-05-30 RX ADMIN — CARVEDILOL 3.12 MG: 3.12 TABLET, FILM COATED ORAL at 08:05

## 2020-05-30 RX ADMIN — ASPIRIN 81 MG: 81 TABLET, COATED ORAL at 06:05

## 2020-05-30 RX ADMIN — BRIMONIDINE TARTRATE 1 DROP: 1.5 SOLUTION OPHTHALMIC at 09:05

## 2020-05-30 RX ADMIN — TIMOLOL MALEATE 1 DROP: 5 SOLUTION OPHTHALMIC at 08:05

## 2020-05-30 RX ADMIN — LEVALBUTEROL HYDROCHLORIDE 0.63 MG: 0.63 SOLUTION RESPIRATORY (INHALATION) at 12:05

## 2020-05-30 RX ADMIN — TIMOLOL MALEATE 1 DROP: 5 SOLUTION OPHTHALMIC at 09:05

## 2020-05-30 RX ADMIN — SIMVASTATIN 10 MG: 5 TABLET, FILM COATED ORAL at 09:05

## 2020-05-30 RX ADMIN — SACUBITRIL AND VALSARTAN 1 TABLET: 24; 26 TABLET, FILM COATED ORAL at 08:05

## 2020-05-30 RX ADMIN — BRIMONIDINE TARTRATE 1 DROP: 1.5 SOLUTION OPHTHALMIC at 08:05

## 2020-05-30 RX ADMIN — BUMETANIDE 3 MG: 0.25 INJECTION INTRAMUSCULAR; INTRAVENOUS at 09:05

## 2020-05-30 RX ADMIN — METOLAZONE 5 MG: 5 TABLET ORAL at 08:05

## 2020-05-30 RX ADMIN — DIPHENHYDRAMINE HYDROCHLORIDE 25 MG: 25 CAPSULE ORAL at 09:05

## 2020-05-30 RX ADMIN — PANTOPRAZOLE SODIUM 40 MG: 40 TABLET, DELAYED RELEASE ORAL at 08:05

## 2020-05-30 RX ADMIN — ALLOPURINOL 100 MG: 100 TABLET ORAL at 08:05

## 2020-05-30 RX ADMIN — ENOXAPARIN SODIUM 30 MG: 100 INJECTION SUBCUTANEOUS at 04:05

## 2020-05-30 RX ADMIN — BUMETANIDE 3 MG: 0.25 INJECTION INTRAMUSCULAR; INTRAVENOUS at 08:05

## 2020-05-30 RX ADMIN — LATANOPROST 1 DROP: 50 SOLUTION OPHTHALMIC at 09:05

## 2020-05-30 RX ADMIN — INSULIN DETEMIR 20 UNITS: 100 INJECTION, SOLUTION SUBCUTANEOUS at 08:05

## 2020-05-30 RX ADMIN — LEVALBUTEROL HYDROCHLORIDE 0.63 MG: 0.63 SOLUTION RESPIRATORY (INHALATION) at 03:05

## 2020-05-30 RX ADMIN — LEVALBUTEROL HYDROCHLORIDE 0.63 MG: 0.63 SOLUTION RESPIRATORY (INHALATION) at 07:05

## 2020-05-30 RX ADMIN — BUDESONIDE 0.5 MG: 0.5 INHALANT RESPIRATORY (INHALATION) at 07:05

## 2020-05-30 RX ADMIN — LOPERAMIDE HYDROCHLORIDE 2 MG: 2 CAPSULE ORAL at 04:05

## 2020-05-30 NOTE — PROGRESS NOTES
[facility]  Cardiology  Progress Note    Patient Name: Yan Choudhury  MRN: [id number]  Admission Date: 5/28/2020  Hospital Length of Stay: 1 days  Code Status: Full Code   Attending Physician: Tommy Christopher MD   Primary Care Physician: Sandra Estevez MD  Expected Discharge Date:   Principal Problem:Acute on chronic systolic heart failure    Subjective:   HPI   Mr. Choudhury is a 65 year old male patient whose current medical conditions include combined CHF, CAD/ICM, LBBB, HTN, CRI, DM, CRT-CD, dyslipidemia, and obesity who presented to [facility] ED yesterday with a chief complaint of worsening SOB over the past two weeks. Associated symptoms included orthopnea, BLE edema, abdominal bloating, and a weight gain of 10 lbs. Patient denied any associated davion chest pain, palpitations, PND, near syncope, syncope, fever, or chills. Initial workup in ED revealed BNP > 3,000, creatinine of 3.0, and troponin of 0.061 and patient was subsequently admitted for further evaluation and treatment. Cardiology consulted to assist with management. Patient seen and examined today, sitting up in bed. +Conversational dyspnea noted. Admits to SOB and BLE edema. Remains chest pain free. He admits to dietary indiscretion, recently ate crawfish. He reports compliance with his medications. Chart reviewed. Troponin chronically elevated. Stress 6/19 negative for ischemia, + fixed defect. Repeat echo pending.  Hospital Course:   5/30/202 DIURESING FEELS BETTER LESS LEG SWELLING NO SHORTNESS OF BREATH.    Interval History: FEELS BETTER LESS SHORTNESS OF BRAETH AND  LEGS WELLING    Review of Systems   Constitution: Negative for malaise/fatigue.   Eyes: Negative for blurred vision.   Cardiovascular: Negative for chest pain, claudication, cyanosis, dyspnea on exertion, irregular heartbeat, leg swelling, near-syncope, orthopnea, palpitations and paroxysmal nocturnal dyspnea.   Respiratory: Negative for cough, hemoptysis and shortness of breath.     Hematologic/Lymphatic: Negative for bleeding problem. Does not bruise/bleed easily.   Skin: Negative for dry skin and itching.   Musculoskeletal: Negative for falls, muscle weakness and myalgias.   Gastrointestinal: Negative for abdominal pain, diarrhea, heartburn, hematemesis, hematochezia and melena.   Genitourinary: Negative for flank pain and hematuria.   Neurological: Negative for dizziness, focal weakness, headaches, light-headedness, numbness, paresthesias, seizures and weakness.   Psychiatric/Behavioral: Negative for altered mental status and memory loss. The patient is not nervous/anxious.    Allergic/Immunologic: Negative for hives.     Objective:     Vital Signs (Most Recent):  Temp: 97 °F (36.1 °C) (05/30/20 0731)  Pulse: 68 (05/30/20 1211)  Resp: 18 (05/30/20 1211)  BP: 109/66 (05/30/20 1211)  SpO2: 100 % (05/30/20 1211) Vital Signs (24h Range):  Temp:  [96.7 °F (35.9 °C)-97.8 °F (36.6 °C)] 97 °F (36.1 °C)  Pulse:  [63-75] 68  Resp:  [18-22] 18  SpO2:  [93 %-100 %] 100 %  BP: (104-121)/(66-79) 109/66     Weight: 105.4 kg (232 lb 5.8 oz)  Body mass index is 35.33 kg/m².     SpO2: 100 %  O2 Device (Oxygen Therapy): nasal cannula      Intake/Output Summary (Last 24 hours) at 5/30/2020 1313  Last data filed at 5/30/2020 0600  Gross per 24 hour   Intake 615 ml   Output 300 ml   Net 315 ml       Lines/Drains/Airways     Peripheral Intravenous Line                 Peripheral IV - Single Lumen 05/28/20 1601 22 G Right Wrist 1 day                Physical Exam   Constitutional: He is oriented to person, place, and time. He appears well-developed and well-nourished. No distress.   HENT:   Head: Normocephalic and atraumatic.   Eyes: Pupils are equal, round, and reactive to light. EOM are normal. Right eye exhibits no discharge. Left eye exhibits no discharge.   Neck: Neck supple. No JVD present. No thyromegaly present.   Cardiovascular: Normal rate, regular rhythm, normal heart sounds and intact distal pulses.  Exam reveals no gallop and no friction rub.   No murmur heard.  Pulmonary/Chest: Effort normal. No respiratory distress. He has no wheezes. He has no rales. He exhibits no tenderness.   DECREASED BREATH SOUNDS AT BASES.   Abdominal: Soft. Bowel sounds are normal. He exhibits no distension. There is no tenderness.   Musculoskeletal: Normal range of motion. He exhibits edema (1+ BILATERALLY).   Neurological: He is alert and oriented to person, place, and time. No cranial nerve deficit.   Skin: Skin is warm and dry. No rash noted. He is not diaphoretic. No erythema.   Psychiatric: He has a normal mood and affect. His behavior is normal.   Nursing note and vitals reviewed.      Significant Labs:   Recent Lab Results       05/30/20  1135   05/30/20  0536   05/30/20  0504   05/29/20  2037   05/29/20  1656        Albumin   3.1           Alkaline Phosphatase   98           ALT   23           Anion Gap   12           Aniso   Moderate           AST   44           Baso #   0.04           Basophil%   0.7           Bilirubin, Direct   0.7           BILIRUBIN TOTAL   1.1  Comment:  For infants and newborns, interpretation of results should be based  on gestational age, weight and in agreement with clinical  observations.  Premature Infant recommended reference ranges:  Up to 24 hours.............<8.0 mg/dL  Up to 48 hours............<12.0 mg/dL  3-5 days..................<15.0 mg/dL  6-29 days.................<15.0 mg/dL             BUN, Bld   37           Calcium   9.1           Chloride   99           CO2   26           Creatinine   3.0           Differential Method   Automated           eGFR if    24           eGFR if non    21  Comment:  Calculation used to obtain the estimated glomerular filtration  rate (eGFR) is the CKD-EPI equation.              Eos #   0.2           Eosinophil%   3.6           Glucose   69           Gran # (ANC)   2.7           Gran%   50.2           Hematocrit   42.0            Hemoglobin   12.8           Hypo   Occasional           Immature Grans (Abs)   0.01  Comment:  Mild elevation in immature granulocytes is non specific and   can be seen in a variety of conditions including stress response,   acute inflammation, trauma and pregnancy. Correlation with other   laboratory and clinical findings is essential.             Immature Granulocytes   0.2           Lymph #   1.7           Lymph%   31.8           Magnesium   1.7           MCH   24.6           MCHC   30.5           MCV   81           Mono #   0.7           Mono%   13.5           MPV   SEE COMMENT  Comment:  Result not available.           nRBC   0           Ovalocytes   Occasional           Phosphorus   3.6           Platelet Estimate   Appears normal           Platelets   170           POCT Glucose 127   75 97 108     Poik   Slight           Poly   Occasional           Potassium   4.1           PROTEIN TOTAL   7.0           RBC   5.20           RDW   20.2           Schistocytes   Present           Sodium   137           Stomatocytes   Present           Target Cells   Occasional           Tear Drop Cells   Occasional           WBC   5.34                                Significant Imaging REVIEWED CXR.    Assessment and Plan:     Brief HPI:A 64 YO AMLE PRESENTS WITH DECOMPENSATED SYSTOLIC CHF IS DIURESING SLOWLY. LESS SHORT OF BREATH AND  LEG EDEMA. WILL CONTINUE DIURESIS.     * Acute on chronic combined systolic and diastolic  heart failure with EF 15-20%  -Presented with decompensated combined CHF, in part due to dietary indiscretion  -Continue IV diuresis  -Add 5 mg of metolazone today and assess response  -Consider Lasix gtt if no improvement  -Continue Entresto, Coreg  -Repeat echo pending  -MPI stress test 6/19 negative for ischemia, + fixed defects    5/30   DIURESIS IN PROGRESS IMPROVING CLINICALLY.    Elevated LFTs  -Secondary to hepatic congestion from CHF  -Monitor with diuresis    Type 2 diabetes mellitus, with  long-term current use of insulin  -Mgmt as per primary team    Biventricular ICD (implantable cardioverter-defibrillator) in place  -No AICD shocks    CKD (chronic kidney disease) stage 4, GFR 15-29 ml/min  -Creatinine 3.0, likely cardiorenal  -Monitor with diuresis  -Consider nephrology consult    Unspecified essential hypertension  -Continue home medications        VTE Risk Mitigation (From admission, onward)         Ordered     enoxaparin injection 30 mg  Daily      05/28/20 7519                Jacky Mercer MD  Cardiology  [facility]

## 2020-05-30 NOTE — ASSESSMENT & PLAN NOTE
-Presented with decompensated combined CHF, in part due to dietary indiscretion  -Continue IV diuresis  -Add 5 mg of metolazone today and assess response  -Consider Lasix gtt if no improvement  -Continue Entresto, Coreg  -Repeat echo pending  -MPI stress test 6/19 negative for ischemia, + fixed defects    5/30   DIURESIS IN PROGRESS IMPROVING CLINICALLY.

## 2020-05-30 NOTE — ASSESSMENT & PLAN NOTE
- S/P CRT-ICD  - H/O ischemic Cardiomyopathy , EF 15 to 20%     5/30  Palliative consult  Cards on board  Started metolazone

## 2020-05-30 NOTE — ASSESSMENT & PLAN NOTE
- Continue Diuresis   - supplemental O2 to keep O2 sat 92% or higher   - Home O2 eval pending clinical course   -H/O Sleep Apnea     5/30  Continue diuresing   supp O2

## 2020-05-30 NOTE — SUBJECTIVE & OBJECTIVE
Interval History: FEELS BETTER LESS SHORTNESS OF BRAETH AND  LEGS WELLING    Review of Systems   Constitution: Negative for malaise/fatigue.   Eyes: Negative for blurred vision.   Cardiovascular: Negative for chest pain, claudication, cyanosis, dyspnea on exertion, irregular heartbeat, leg swelling, near-syncope, orthopnea, palpitations and paroxysmal nocturnal dyspnea.   Respiratory: Negative for cough, hemoptysis and shortness of breath.    Hematologic/Lymphatic: Negative for bleeding problem. Does not bruise/bleed easily.   Skin: Negative for dry skin and itching.   Musculoskeletal: Negative for falls, muscle weakness and myalgias.   Gastrointestinal: Negative for abdominal pain, diarrhea, heartburn, hematemesis, hematochezia and melena.   Genitourinary: Negative for flank pain and hematuria.   Neurological: Negative for dizziness, focal weakness, headaches, light-headedness, numbness, paresthesias, seizures and weakness.   Psychiatric/Behavioral: Negative for altered mental status and memory loss. The patient is not nervous/anxious.    Allergic/Immunologic: Negative for hives.     Objective:     Vital Signs (Most Recent):  Temp: 97 °F (36.1 °C) (05/30/20 0731)  Pulse: 68 (05/30/20 1211)  Resp: 18 (05/30/20 1211)  BP: 109/66 (05/30/20 1211)  SpO2: 100 % (05/30/20 1211) Vital Signs (24h Range):  Temp:  [96.7 °F (35.9 °C)-97.8 °F (36.6 °C)] 97 °F (36.1 °C)  Pulse:  [63-75] 68  Resp:  [18-22] 18  SpO2:  [93 %-100 %] 100 %  BP: (104-121)/(66-79) 109/66     Weight: 105.4 kg (232 lb 5.8 oz)  Body mass index is 35.33 kg/m².     SpO2: 100 %  O2 Device (Oxygen Therapy): nasal cannula      Intake/Output Summary (Last 24 hours) at 5/30/2020 1313  Last data filed at 5/30/2020 0600  Gross per 24 hour   Intake 615 ml   Output 300 ml   Net 315 ml       Lines/Drains/Airways     Peripheral Intravenous Line                 Peripheral IV - Single Lumen 05/28/20 1601 22 G Right Wrist 1 day                Physical Exam    Constitutional: He is oriented to person, place, and time. He appears well-developed and well-nourished. No distress.   HENT:   Head: Normocephalic and atraumatic.   Eyes: Pupils are equal, round, and reactive to light. EOM are normal. Right eye exhibits no discharge. Left eye exhibits no discharge.   Neck: Neck supple. No JVD present. No thyromegaly present.   Cardiovascular: Normal rate, regular rhythm, normal heart sounds and intact distal pulses. Exam reveals no gallop and no friction rub.   No murmur heard.  Pulmonary/Chest: Effort normal. No respiratory distress. He has no wheezes. He has no rales. He exhibits no tenderness.   DECREASED BREATH SOUNDS AT BASES.   Abdominal: Soft. Bowel sounds are normal. He exhibits no distension. There is no tenderness.   Musculoskeletal: Normal range of motion. He exhibits edema (1+ BILATERALLY).   Neurological: He is alert and oriented to person, place, and time. No cranial nerve deficit.   Skin: Skin is warm and dry. No rash noted. He is not diaphoretic. No erythema.   Psychiatric: He has a normal mood and affect. His behavior is normal.   Nursing note and vitals reviewed.      Significant Labs:   Recent Lab Results       05/30/20  1135   05/30/20  0536   05/30/20  0504   05/29/20  2037   05/29/20  1656        Albumin   3.1           Alkaline Phosphatase   98           ALT   23           Anion Gap   12           Aniso   Moderate           AST   44           Baso #   0.04           Basophil%   0.7           Bilirubin, Direct   0.7           BILIRUBIN TOTAL   1.1  Comment:  For infants and newborns, interpretation of results should be based  on gestational age, weight and in agreement with clinical  observations.  Premature Infant recommended reference ranges:  Up to 24 hours.............<8.0 mg/dL  Up to 48 hours............<12.0 mg/dL  3-5 days..................<15.0 mg/dL  6-29 days.................<15.0 mg/dL             BUN, Bld   37           Calcium   9.1            Chloride   99           CO2   26           Creatinine   3.0           Differential Method   Automated           eGFR if    24           eGFR if non    21  Comment:  Calculation used to obtain the estimated glomerular filtration  rate (eGFR) is the CKD-EPI equation.              Eos #   0.2           Eosinophil%   3.6           Glucose   69           Gran # (ANC)   2.7           Gran%   50.2           Hematocrit   42.0           Hemoglobin   12.8           Hypo   Occasional           Immature Grans (Abs)   0.01  Comment:  Mild elevation in immature granulocytes is non specific and   can be seen in a variety of conditions including stress response,   acute inflammation, trauma and pregnancy. Correlation with other   laboratory and clinical findings is essential.             Immature Granulocytes   0.2           Lymph #   1.7           Lymph%   31.8           Magnesium   1.7           MCH   24.6           MCHC   30.5           MCV   81           Mono #   0.7           Mono%   13.5           MPV   SEE COMMENT  Comment:  Result not available.           nRBC   0           Ovalocytes   Occasional           Phosphorus   3.6           Platelet Estimate   Appears normal           Platelets   170           POCT Glucose 127   75 97 108     Poik   Slight           Poly   Occasional           Potassium   4.1           PROTEIN TOTAL   7.0           RBC   5.20           RDW   20.2           Schistocytes   Present           Sodium   137           Stomatocytes   Present           Target Cells   Occasional           Tear Drop Cells   Occasional           WBC   5.34                                Significant Imaging REVIEWED CXR.

## 2020-05-30 NOTE — PROGRESS NOTES
[facility] Hospital Medicine  Progress Note    Patient Name: Yan Choudhury  MRN: [id number]  Patient Class: IP- Inpatient   Admission Date: 5/28/2020  Length of Stay: 1 days  Attending Physician: Tommy Christopher MD  Primary Care Provider: Sandra Estevez MD        Subjective:     Principal Problem:Acute on chronic systolic heart failure        HPI:  Yan Choudhury is a 65 y.o. male patient with a PMHx of type 2 DM, HLD, HTN, obesity, CAD, ischemic cardiomyopathy, diverticulitis of colon, CKD Stage 3,  CHF,  PPM, CAD, and JAHAIRA on CPAP and PSHx of cardiac catheterization who presents to the Emergency Department for evaluation of SOB which onset gradually x2 weeks ago . Pt states he has gained 10 lbs in x1 week. Symptoms are worsening and moderate in severity. No mitigating or exacerbating factors reported. Associated sx includes BLE edema. Patient denies any fever, chills, numbness, weakness, CP, smoking, cough, palpitations, and all other sxs at this time. Pt states he is compliant with his fluid pill. No further complaints or concerns at this time.  Pt reports compliance with prescribed medications.  Pt reports increased sodium consumption (crawfish) and denies increased fluid intake.  Pt denies smoking and use of ETOH.  Pt is followed outpatient by Dr. Estevez (PCP) and Dr. Calvillo (Cardiology).  Pt is a full code and Elsie Choudhury (wife) is the surrogate decision maker.  ER work up showed: INR 1.6, BUN/Creatinine 34/3, Glucose 111, T. Bili 1.7, AST 50, BNP 3059, Troponin 0.061, , and pO2 53.  Chest xray showed no acute process.  EKG showed SR with fusion complexes, L axis deviation, and nonspecific intraventricular block with HR 98.  Hospital Medicine contacted for admission with patient placed in Observation Unit for further evaluation.     Overview/Hospital Course:  · 5/29- On O2 at 2L/min . SpO2 is satisfactory . Pt still c/o SOB and wheezing  and appears significant volume overloaded on physical exam .  Denies chest pain.  Cardiology added Metolazone . Continue IV Bumex . CHF pathway initiated. Labs resulted Na 137, K 3.9, BUN 30, Creatinine 3.0 ( stable) , BNP on admit 3059. Echo resulted Left ventricular systolic function with EF  15%.Grade III (severe) left ventricular diastolic dysfunction , global hypokinesia and pul HTN with estimated PA pressure 63, mod to severe TR.   · 5/30: c/o sob and leg swelling. Continue iv bumex and metolazone. Echo reviewed. Discussed palliative option. Patient also c/o diarrhea. No abx given during hospitalization. tx sx and obtain stool cx      Interval History: continues on supp O2. Reports SOB and leg swelling and diarrhea. Consult palliative care. Continue diuresing     Review of Systems   Constitutional: Negative for fever.   Respiratory: Positive for shortness of breath and wheezing.    Cardiovascular: Positive for leg swelling. Negative for chest pain.   Gastrointestinal: Positive for abdominal pain, diarrhea (from diarrhea) and rectal pain. Negative for constipation, nausea and vomiting.     Objective:     Vital Signs (Most Recent):  Temp: 97 °F (36.1 °C) (05/30/20 0731)  Pulse: 69 (05/30/20 1304)  Resp: 18 (05/30/20 1211)  BP: 109/66 (05/30/20 1211)  SpO2: 100 % (05/30/20 1211) Vital Signs (24h Range):  Temp:  [96.7 °F (35.9 °C)-97.8 °F (36.6 °C)] 97 °F (36.1 °C)  Pulse:  [63-80] 69  Resp:  [18-22] 18  SpO2:  [93 %-100 %] 100 %  BP: (104-121)/(66-79) 109/66     Weight: 105.4 kg (232 lb 5.8 oz)  Body mass index is 35.33 kg/m².    Intake/Output Summary (Last 24 hours) at 5/30/2020 1526  Last data filed at 5/30/2020 0600  Gross per 24 hour   Intake 615 ml   Output 300 ml   Net 315 ml      Physical Exam   Constitutional: He appears well-developed and well-nourished. No distress.   HENT:   Head: Normocephalic and atraumatic.   Eyes: Right eye exhibits abnormal extraocular motion.   Right eye prosthesis   Neck: Normal range of motion.   Cardiovascular: Normal rate.    Pulmonary/Chest: He is in respiratory distress.   Abdominal: Soft. He exhibits distension.   Musculoskeletal: Normal range of motion. He exhibits edema.   Neurological: He is alert.   Skin: Skin is warm and dry.   Psychiatric: He has a normal mood and affect. His behavior is normal.   Vitals reviewed.      Significant Labs:   CBC:   Recent Labs   Lab 05/28/20  1601 05/30/20  0536   WBC 5.74 5.34   HGB 12.8* 12.8*   HCT 41.4 42.0   * 170     CMP:   Recent Labs   Lab 05/28/20  1601 05/29/20  0347 05/30/20  0536    137 137   K 4.0 3.9 4.1   CL 99 99 99   CO2 26 30* 26   * 82 69*   BUN 34* 36* 37*   CREATININE 3.0* 3.0* 3.0*   CALCIUM 9.2 8.7 9.1   PROT 6.9  --  7.0   ALBUMIN 3.0*  --  3.1*   BILITOT 1.7*  --  1.1*   ALKPHOS 91  --  98   AST 50*  --  44*   ALT 25  --  23   ANIONGAP 11 8 12   EGFRNONAA 21* 21* 21*       Significant Imaging: CXR: I have reviewed all pertinent results/findings within the past 24 hours and my personal findings are:  no acute findings      Assessment/Plan:      * Acute on chronic combined systolic and diastolic  heart failure with EF 15-20%  - Initiate CHF pathway   -Bumex 3 mg IV BID   -Start BB  -Continue Entresto   -Strict intake /Output   - Salt restriction 2 gram /day   -Fluid restriction 1.5 L/day   5/29- Metolazone added per Cardiology.  Assess response   5/30:   continues to have dyspnea and volume overload  Now with diarrhea      Elevated LFTs  - Likely hepatic congestion due to acute on chronic CHF( HFrEF)   -Expect to improve with diuresis     5/30  From hepatic congestion  Slightly elevated but trending down  Continue to monitor      Type 2 diabetes mellitus, with long-term current use of insulin  - continue basal  Insulin and meal time bolus per sliding scale   -HgA1c 6.0 ( 8 mos ago) suggestive of good out patient control       Biventricular ICD (implantable cardioverter-defibrillator) in place  - S/P CRT-ICD  - H/O ischemic Cardiomyopathy , EF 15 to 20%      5/30  Palliative consult  Cards on board  Started metolazone       CKD (chronic kidney disease) stage 4, GFR 15-29 ml/min  - Monitor renal indices while on aggressive diuretic  regimen     5/30  Monitor  Consider neph consult  Palliative has been consulted for goals of care and possible hospice rec      Pulmonary HTN  - Continue Diuresis   - supplemental O2 to keep O2 sat 92% or higher   - Home O2 eval pending clinical course   -H/O Sleep Apnea     5/30  Continue diuresing   supp O2      Unspecified essential hypertension  - Resume Home med and adjust dose accordingly     5/30  Continue poc        VTE Risk Mitigation (From admission, onward)         Ordered     enoxaparin injection 30 mg  Daily      05/28/20 6062                      Tommy Christopher MD  Department of Hospital Medicine   [facility]

## 2020-05-30 NOTE — HOSPITAL COURSE
5/30/202 DIURESING FEELS BETTER LESS LEG SWELLING NO SHORTNESS OF BREATH.    5/31/2020   LEG SWELLING IMPROVED LESS SHORT OF BREATH.    6/1/20--Patient seen and examined in room, lying in bed. Feels ok today. Reports improvement in shortness of breath and leg swelling. Needs continued IV diuresis for another today. Labs reviewed, K+4.2, Cr 3.1.     6/2/20-Patient seen and examined in room, lying in bed. Ready for discharge home today. Continues to diurese well each day. Ok to discharge home today with close cardiology follow up.

## 2020-05-30 NOTE — ASSESSMENT & PLAN NOTE
- Monitor renal indices while on aggressive diuretic  regimen     5/30  Monitor  Consider neph consult  Palliative has been consulted for goals of care and possible hospice rec

## 2020-05-30 NOTE — SUBJECTIVE & OBJECTIVE
Interval History: continues on supp O2. Reports SOB and leg swelling and diarrhea. Consult palliative care. Continue diuresing     Review of Systems   Constitutional: Negative for fever.   Respiratory: Positive for shortness of breath and wheezing.    Cardiovascular: Positive for leg swelling. Negative for chest pain.   Gastrointestinal: Positive for abdominal pain, diarrhea (from diarrhea) and rectal pain. Negative for constipation, nausea and vomiting.     Objective:     Vital Signs (Most Recent):  Temp: 97 °F (36.1 °C) (05/30/20 0731)  Pulse: 69 (05/30/20 1304)  Resp: 18 (05/30/20 1211)  BP: 109/66 (05/30/20 1211)  SpO2: 100 % (05/30/20 1211) Vital Signs (24h Range):  Temp:  [96.7 °F (35.9 °C)-97.8 °F (36.6 °C)] 97 °F (36.1 °C)  Pulse:  [63-80] 69  Resp:  [18-22] 18  SpO2:  [93 %-100 %] 100 %  BP: (104-121)/(66-79) 109/66     Weight: 105.4 kg (232 lb 5.8 oz)  Body mass index is 35.33 kg/m².    Intake/Output Summary (Last 24 hours) at 5/30/2020 1526  Last data filed at 5/30/2020 0600  Gross per 24 hour   Intake 615 ml   Output 300 ml   Net 315 ml      Physical Exam   Constitutional: He appears well-developed and well-nourished. No distress.   HENT:   Head: Normocephalic and atraumatic.   Eyes: Right eye exhibits abnormal extraocular motion.   Right eye prosthesis   Neck: Normal range of motion.   Cardiovascular: Normal rate.   Pulmonary/Chest: He is in respiratory distress.   Abdominal: Soft. He exhibits distension.   Musculoskeletal: Normal range of motion. He exhibits edema.   Neurological: He is alert.   Skin: Skin is warm and dry.   Psychiatric: He has a normal mood and affect. His behavior is normal.   Vitals reviewed.      Significant Labs:   CBC:   Recent Labs   Lab 05/28/20  1601 05/30/20  0536   WBC 5.74 5.34   HGB 12.8* 12.8*   HCT 41.4 42.0   * 170     CMP:   Recent Labs   Lab 05/28/20  1601 05/29/20  0347 05/30/20  0536    137 137   K 4.0 3.9 4.1   CL 99 99 99   CO2 26 30* 26   * 82  69*   BUN 34* 36* 37*   CREATININE 3.0* 3.0* 3.0*   CALCIUM 9.2 8.7 9.1   PROT 6.9  --  7.0   ALBUMIN 3.0*  --  3.1*   BILITOT 1.7*  --  1.1*   ALKPHOS 91  --  98   AST 50*  --  44*   ALT 25  --  23   ANIONGAP 11 8 12   EGFRNONAA 21* 21* 21*       Significant Imaging: CXR: I have reviewed all pertinent results/findings within the past 24 hours and my personal findings are:  no acute findings

## 2020-05-30 NOTE — PLAN OF CARE
Discussed Plan of Care with patient and verbalized understanding - Patient remains AAOx4 - remains free of falls, accidents and trauma during the day shift. Bed is in the low position bed alarm is on and [employer] is in the room and the call light is within reach. Consult for Palliative care ordered. Stool specimen to be collected for culture. Will continue to monitor

## 2020-05-30 NOTE — ASSESSMENT & PLAN NOTE
- Likely hepatic congestion due to acute on chronic CHF( HFrEF)   -Expect to improve with diuresis     5/30  From hepatic congestion  Slightly elevated but trending down  Continue to monitor

## 2020-05-30 NOTE — PLAN OF CARE
NEURO: AAO  PAIN: No c/o this shift  CARDIAC: Paced on monitor #8624  RESP: 2 L/NC. Continuously have to reapply d/t pt removing cannula. Pt gets SOB with exertion. Wheezing and cough with exertion has improved with scheduled breathing treatments.   MUSCULO: Continued weakness and unsteady on feet. Bed alarm set at all times d/t pt does not always call for assistance. [employer] in use  GI/: Pt had BM during shift.  SKIN: Intact  SAFETY: Bed low/locked, SR up x2, CB in easy reach  POC: Ongoing  HOURLY ROUNDING COMPLETE  WILL CONTINUE TO MONITOR

## 2020-05-30 NOTE — ASSESSMENT & PLAN NOTE
- Initiate CHF pathway   -Bumex 3 mg IV BID   -Start BB  -Continue Entresto   -Strict intake /Output   - Salt restriction 2 gram /day   -Fluid restriction 1.5 L/day   5/29- Metolazone added per Cardiology.  Assess response   5/30:   continues to have dyspnea and volume overload  Now with diarrhea

## 2020-05-31 LAB
ANION GAP SERPL CALC-SCNC: 9 MMOL/L (ref 8–16)
BUN SERPL-MCNC: 39 MG/DL (ref 8–23)
CALCIUM SERPL-MCNC: 8.7 MG/DL (ref 8.7–10.5)
CHLORIDE SERPL-SCNC: 99 MMOL/L (ref 95–110)
CO2 SERPL-SCNC: 28 MMOL/L (ref 23–29)
CREAT SERPL-MCNC: 2.9 MG/DL (ref 0.5–1.4)
EST. GFR  (AFRICAN AMERICAN): 25 ML/MIN/1.73 M^2
EST. GFR  (NON AFRICAN AMERICAN): 21 ML/MIN/1.73 M^2
GLUCOSE SERPL-MCNC: 48 MG/DL (ref 70–110)
POCT GLUCOSE: 106 MG/DL (ref 70–110)
POCT GLUCOSE: 111 MG/DL (ref 70–110)
POCT GLUCOSE: 50 MG/DL (ref 70–110)
POCT GLUCOSE: 64 MG/DL (ref 70–110)
POCT GLUCOSE: 82 MG/DL (ref 70–110)
POCT GLUCOSE: 88 MG/DL (ref 70–110)
POTASSIUM SERPL-SCNC: 3.8 MMOL/L (ref 3.5–5.1)
SODIUM SERPL-SCNC: 136 MMOL/L (ref 136–145)

## 2020-05-31 PROCEDURE — 63600175 PHARM REV CODE 636 W HCPCS: Performed by: INTERNAL MEDICINE

## 2020-05-31 PROCEDURE — 25000242 PHARM REV CODE 250 ALT 637 W/ HCPCS: Performed by: FAMILY MEDICINE

## 2020-05-31 PROCEDURE — 99900035 HC TECH TIME PER 15 MIN (STAT)

## 2020-05-31 PROCEDURE — 25000003 PHARM REV CODE 250: Performed by: INTERNAL MEDICINE

## 2020-05-31 PROCEDURE — 27000221 HC OXYGEN, UP TO 24 HOURS

## 2020-05-31 PROCEDURE — 94640 AIRWAY INHALATION TREATMENT: CPT

## 2020-05-31 PROCEDURE — 94761 N-INVAS EAR/PLS OXIMETRY MLT: CPT

## 2020-05-31 PROCEDURE — 99232 SBSQ HOSP IP/OBS MODERATE 35: CPT | Mod: ,,, | Performed by: INTERNAL MEDICINE

## 2020-05-31 PROCEDURE — 25000003 PHARM REV CODE 250: Performed by: PHYSICIAN ASSISTANT

## 2020-05-31 PROCEDURE — 25000003 PHARM REV CODE 250: Performed by: NURSE PRACTITIONER

## 2020-05-31 PROCEDURE — S0171 BUMETANIDE 0.5 MG: HCPCS | Performed by: INTERNAL MEDICINE

## 2020-05-31 PROCEDURE — 21400001 HC TELEMETRY ROOM

## 2020-05-31 PROCEDURE — 80048 BASIC METABOLIC PNL TOTAL CA: CPT

## 2020-05-31 PROCEDURE — 96372 THER/PROPH/DIAG INJ SC/IM: CPT

## 2020-05-31 PROCEDURE — 99232 PR SUBSEQUENT HOSPITAL CARE,LEVL II: ICD-10-PCS | Mod: ,,, | Performed by: INTERNAL MEDICINE

## 2020-05-31 PROCEDURE — 36415 COLL VENOUS BLD VENIPUNCTURE: CPT

## 2020-05-31 PROCEDURE — 25000242 PHARM REV CODE 250 ALT 637 W/ HCPCS: Performed by: INTERNAL MEDICINE

## 2020-05-31 RX ORDER — LEVALBUTEROL INHALATION SOLUTION 0.63 MG/3ML
0.63 SOLUTION RESPIRATORY (INHALATION) EVERY 12 HOURS
Status: DISCONTINUED | OUTPATIENT
Start: 2020-05-31 | End: 2020-06-02 | Stop reason: HOSPADM

## 2020-05-31 RX ADMIN — BUDESONIDE 0.5 MG: 0.5 INHALANT RESPIRATORY (INHALATION) at 07:05

## 2020-05-31 RX ADMIN — LEVALBUTEROL HYDROCHLORIDE 0.63 MG: 0.63 SOLUTION RESPIRATORY (INHALATION) at 07:05

## 2020-05-31 RX ADMIN — LEVALBUTEROL HYDROCHLORIDE 0.63 MG: 0.63 SOLUTION RESPIRATORY (INHALATION) at 12:05

## 2020-05-31 RX ADMIN — CARVEDILOL 3.12 MG: 3.12 TABLET, FILM COATED ORAL at 04:05

## 2020-05-31 RX ADMIN — LEVALBUTEROL HYDROCHLORIDE 0.63 MG: 0.63 SOLUTION RESPIRATORY (INHALATION) at 03:05

## 2020-05-31 RX ADMIN — ENOXAPARIN SODIUM 30 MG: 100 INJECTION SUBCUTANEOUS at 04:05

## 2020-05-31 RX ADMIN — SACUBITRIL AND VALSARTAN 1 TABLET: 24; 26 TABLET, FILM COATED ORAL at 09:05

## 2020-05-31 RX ADMIN — CARVEDILOL 3.12 MG: 3.12 TABLET, FILM COATED ORAL at 09:05

## 2020-05-31 RX ADMIN — BRIMONIDINE TARTRATE 1 DROP: 1.5 SOLUTION OPHTHALMIC at 09:05

## 2020-05-31 RX ADMIN — ALLOPURINOL 100 MG: 100 TABLET ORAL at 09:05

## 2020-05-31 RX ADMIN — BUMETANIDE 3 MG: 0.25 INJECTION INTRAMUSCULAR; INTRAVENOUS at 09:05

## 2020-05-31 RX ADMIN — TIMOLOL MALEATE 1 DROP: 5 SOLUTION OPHTHALMIC at 09:05

## 2020-05-31 RX ADMIN — PANTOPRAZOLE SODIUM 40 MG: 40 TABLET, DELAYED RELEASE ORAL at 09:05

## 2020-05-31 RX ADMIN — DIPHENHYDRAMINE HYDROCHLORIDE 25 MG: 25 CAPSULE ORAL at 09:05

## 2020-05-31 RX ADMIN — LATANOPROST 1 DROP: 50 SOLUTION OPHTHALMIC at 09:05

## 2020-05-31 RX ADMIN — SIMVASTATIN 10 MG: 5 TABLET, FILM COATED ORAL at 09:05

## 2020-05-31 RX ADMIN — METOLAZONE 5 MG: 5 TABLET ORAL at 09:05

## 2020-05-31 RX ADMIN — ASPIRIN 81 MG: 81 TABLET, COATED ORAL at 06:05

## 2020-05-31 NOTE — PROGRESS NOTES
[facility]  Cardiology  Progress Note    Patient Name: Yan Choudhury  MRN: [id number]  Admission Date: 5/28/2020  Hospital Length of Stay: 2 days  Code Status: Full Code   Attending Physician: Tommy Christopher MD   Primary Care Physician: Sandra Estevez MD  Expected Discharge Date:   Principal Problem:Acute on chronic systolic heart failure    Subjective:   HPI   Mr. Choudhury is a 65 year old male patient whose current medical conditions include combined CHF, CAD/ICM, LBBB, HTN, CRI, DM, CRT-CD, dyslipidemia, and obesity who presented to [facility] ED yesterday with a chief complaint of worsening SOB over the past two weeks. Associated symptoms included orthopnea, BLE edema, abdominal bloating, and a weight gain of 10 lbs. Patient denied any associated davion chest pain, palpitations, PND, near syncope, syncope, fever, or chills. Initial workup in ED revealed BNP > 3,000, creatinine of 3.0, and troponin of 0.061 and patient was subsequently admitted for further evaluation and treatment. Cardiology consulted to assist with management. Patient seen and examined today, sitting up in bed. +Conversational dyspnea noted. Admits to SOB and BLE edema. Remains chest pain free. He admits to dietary indiscretion, recently ate crawfish. He reports compliance with his medications. Chart reviewed. Troponin chronically elevated. Stress 6/19 negative for ischemia, + fixed defect. Repeat echo pending.    Hospital Course:   5/30/202 DIURESING FEELS BETTER LESS LEG SWELLING NO SHORTNESS OF BREATH.    5/31/2020   LEG SWELLING IMPROVED LESS SHORT OF BREATH.    Interval History: LESS SHORT OF BRAETH AND LESS SWOLLEN LEGS.    Review of Systems   Constitution: Negative for malaise/fatigue.   Eyes: Negative for blurred vision.   Cardiovascular: Negative for chest pain, claudication, cyanosis, dyspnea on exertion, irregular heartbeat, leg swelling, near-syncope, orthopnea, palpitations and paroxysmal nocturnal dyspnea.   Respiratory: Negative  for cough, hemoptysis and shortness of breath.    Hematologic/Lymphatic: Negative for bleeding problem. Does not bruise/bleed easily.   Skin: Negative for dry skin and itching.   Musculoskeletal: Negative for falls, muscle weakness and myalgias.   Gastrointestinal: Negative for abdominal pain, diarrhea, heartburn, hematemesis, hematochezia and melena.   Genitourinary: Negative for flank pain and hematuria.   Neurological: Negative for dizziness, focal weakness, headaches, light-headedness, numbness, paresthesias, seizures and weakness.   Psychiatric/Behavioral: Negative for altered mental status and memory loss. The patient is not nervous/anxious.    Allergic/Immunologic: Negative for hives.     Objective:     Vital Signs (Most Recent):  Temp: 97.6 °F (36.4 °C) (05/31/20 1226)  Pulse: 68 (05/31/20 1226)  Resp: 18 (05/31/20 1226)  BP: 95/67 (05/31/20 1226)  SpO2: 95 % (05/31/20 1226) Vital Signs (24h Range):  Temp:  [96.9 °F (36.1 °C)-98.1 °F (36.7 °C)] 97.6 °F (36.4 °C)  Pulse:  [63-76] 68  Resp:  [17-22] 18  SpO2:  [94 %-100 %] 95 %  BP: ()/(65-79) 95/67     Weight: 105.3 kg (232 lb 2.3 oz)  Body mass index is 35.3 kg/m².     SpO2: 95 %  O2 Device (Oxygen Therapy): nasal cannula      Intake/Output Summary (Last 24 hours) at 5/31/2020 1244  Last data filed at 5/31/2020 0600  Gross per 24 hour   Intake 1726 ml   Output 900 ml   Net 826 ml       Lines/Drains/Airways     Peripheral Intravenous Line                 Peripheral IV - Single Lumen 05/28/20 1601 22 G Right Wrist 2 days                Physical Exam   Constitutional: He is oriented to person, place, and time. He appears well-developed and well-nourished. No distress.   HENT:   Head: Normocephalic and atraumatic.   Eyes: Pupils are equal, round, and reactive to light. EOM are normal. Right eye exhibits no discharge. Left eye exhibits no discharge.   Neck: Neck supple. No JVD present. No thyromegaly present.   Cardiovascular: Normal rate, regular rhythm,  normal heart sounds and intact distal pulses. Exam reveals no gallop and no friction rub.   No murmur heard.  Pulmonary/Chest: Effort normal and breath sounds normal. No respiratory distress. He has no wheezes. He has no rales. He exhibits no tenderness.   DECREASE BREATH SOUNDS AT BASES.   Abdominal: Soft. Bowel sounds are normal. He exhibits no distension. There is no tenderness.   Musculoskeletal: Normal range of motion. He exhibits edema (1+ BETETR THAN YESTERDAY.).   Neurological: He is alert and oriented to person, place, and time. No cranial nerve deficit.   Skin: Skin is warm and dry. No rash noted. He is not diaphoretic. No erythema.   Psychiatric: He has a normal mood and affect. His behavior is normal.   Nursing note and vitals reviewed.      Significant Labs:   Recent Lab Results       05/31/20  1117   05/31/20  0641   05/31/20  0620   05/31/20  0533   05/31/20  0530        Anion Gap         9     BUN, Bld         39     Calcium         8.7     Chloride         99     CO2         28     Creatinine         2.9     eGFR if          25     eGFR if non          21  Comment:  Calculation used to obtain the estimated glomerular filtration  rate (eGFR) is the CKD-EPI equation.        Glucose         48  Comment:  Glucose critical result(s) called and verbal readback obtained from   Nicko Fan RN by [facility] 05/31/2020 07:01       POCT Glucose 106 82 64 50       Potassium         3.8     Sodium         136                      05/30/20  2120   05/30/20  1615        Anion Gap         BUN, Bld         Calcium         Chloride         CO2         Creatinine         eGFR if          eGFR if non African American         Glucose         POCT Glucose 150 160     Potassium         Sodium                   Assessment and Plan:     Brief HPI: A 66 YO MALE WITH BIVENTRICULART FAILURE PRESENTS WITH CHF EXACERBATION. HE AHS DIURESED GENTLY HIS SHORTNESS OF BREATH IMPROVED LEG  SWELLING IMPROVED HE HAS NO ANGINA HE WILL CONTINUE ON CURRENT THERAPY.    * Acute on chronic combined systolic and diastolic  heart failure with EF 15-20%  -Presented with decompensated combined CHF, in part due to dietary indiscretion  -Continue IV diuresis  -Add 5 mg of metolazone today and assess response  -Consider Lasix gtt if no improvement  -Continue Entresto, Coreg  -Repeat echo pending  -MPI stress test 6/19 negative for ischemia, + fixed defects    5/30   DIURESIS IN PROGRESS IMPROVING CLINICALLY.    5/31/202   SLOWLY IMPROVING  WITH CURRENT REGIMEN.    Elevated LFTs  -Secondary to hepatic congestion from CHF  -Monitor with diuresis    Type 2 diabetes mellitus, with long-term current use of insulin  -Mgmt as per primary team    Biventricular ICD (implantable cardioverter-defibrillator) in place  -No AICD shocks    CKD (chronic kidney disease) stage 4, GFR 15-29 ml/min  -Creatinine 3.0, likely cardiorenal  -Monitor with diuresis  -Consider nephrology consult    Unspecified essential hypertension  -Continue home medications        VTE Risk Mitigation (From admission, onward)         Ordered     enoxaparin injection 30 mg  Daily      05/28/20 3227                Jacky Mercer MD  Cardiology  [facility]

## 2020-05-31 NOTE — ASSESSMENT & PLAN NOTE
- S/P CRT-ICD  - H/O ischemic Cardiomyopathy , EF 15 to 20%     5/30  Palliative consult  Cards on board  Started metolazone     5/31  Keep POC

## 2020-05-31 NOTE — ASSESSMENT & PLAN NOTE
- Likely hepatic congestion due to acute on chronic CHF( HFrEF)   -Expect to improve with diuresis     5/30  From hepatic congestion  Slightly elevated but trending down  Continue to monitor    5/31  Improving with diuresis

## 2020-05-31 NOTE — ASSESSMENT & PLAN NOTE
- continue basal  Insulin and meal time bolus per sliding scale   -HgA1c 6.0 ( 8 mos ago) suggestive of good out patient control     5/31  Stable  Avoid hypoglycemic episodes  Hypoglycemic protocol  Avoid nephrotoxic agents

## 2020-05-31 NOTE — ASSESSMENT & PLAN NOTE
- Continue Diuresis   - supplemental O2 to keep O2 sat 92% or higher   - Home O2 eval pending clinical course   -H/O Sleep Apnea     5/30  Continue diuresing   supp O2    5/31  Requires supp O2   Diuresing with iv diuretic

## 2020-05-31 NOTE — PLAN OF CARE
NEURO: AAO. Pt irritable at beginning of shift saying he was going home because he has diarrhea. Attempted to call pt wife, Elsie, and notify of pt desire. No answer. Message left for her to return call. Wife has not yet called back. Talked with pt and he agreed to stay.     PAIN: C/O itching. PRN benadryl ordred and given. Some relief.  CARDIAC: Paced on monitor #[id number]  RESP: 2 L/NC. Continuously have to reapply d/t pt removing cannula. Pt gets SOB with exertion. Wheezing and cough with exertion has improved with scheduled breathing treatments.   MUSCULO: Continued weakness and unsteady on feet but has improved. Bed alarm set at all times d/t pt does not always call for assistance. AVASYS in use  GI/: Pt given PRN immodium. BM x2 during shift. Stool sample collected and sent to lab.  Low blood glucose this AM <50. Juice and jose crackers given. Recheck result 64. More juice given. Recheck result 82. Call team notified. Levemir discontinued.  SKIN: Intact  SAFETY: Bed low/locked, SR up x2, CB in easy reach  POC: Ongoing  HOURLY ROUNDING COMPLETE  WILL CONTINUE TO MONITOR

## 2020-05-31 NOTE — PROGRESS NOTES
[facility] Hospital Medicine  Progress Note    Patient Name: Yan Choudhury  MRN: [id number]  Patient Class: IP- Inpatient   Admission Date: 5/28/2020  Length of Stay: 2 days  Attending Physician: Tommy Christopher MD  Primary Care Provider: Sandra Estevez MD        Subjective:     Principal Problem:Acute on chronic systolic heart failure        HPI:  Yan Choudhury is a 65 y.o. male patient with a PMHx of type 2 DM, HLD, HTN, obesity, CAD, ischemic cardiomyopathy, diverticulitis of colon, CKD Stage 3,  CHF,  PPM, CAD, and JAHAIRA on CPAP and PSHx of cardiac catheterization who presents to the Emergency Department for evaluation of SOB which onset gradually x2 weeks ago . Pt states he has gained 10 lbs in x1 week. Symptoms are worsening and moderate in severity. No mitigating or exacerbating factors reported. Associated sx includes BLE edema. Patient denies any fever, chills, numbness, weakness, CP, smoking, cough, palpitations, and all other sxs at this time. Pt states he is compliant with his fluid pill. No further complaints or concerns at this time.  Pt reports compliance with prescribed medications.  Pt reports increased sodium consumption (crawfish) and denies increased fluid intake.  Pt denies smoking and use of ETOH.  Pt is followed outpatient by Dr. Estevez (PCP) and Dr. Calvillo (Cardiology).  Pt is a full code and Elsie Choudhury (wife) is the surrogate decision maker.  ER work up showed: INR 1.6, BUN/Creatinine 34/3, Glucose 111, T. Bili 1.7, AST 50, BNP 3059, Troponin 0.061, , and pO2 53.  Chest xray showed no acute process.  EKG showed SR with fusion complexes, L axis deviation, and nonspecific intraventricular block with HR 98.  Hospital Medicine contacted for admission with patient placed in Observation Unit for further evaluation.     Overview/Hospital Course:  · 5/29- On O2 at 2L/min . SpO2 is satisfactory . Pt still c/o SOB and wheezing  and appears significant volume overloaded on physical exam .  Denies chest pain.  Cardiology added Metolazone . Continue IV Bumex . CHF pathway initiated. Labs resulted Na 137, K 3.9, BUN 30, Creatinine 3.0 ( stable) , BNP on admit 3059. Echo resulted Left ventricular systolic function with EF  15%.Grade III (severe) left ventricular diastolic dysfunction , global hypokinesia and pul HTN with estimated PA pressure 63, mod to severe TR.   · 5/30: c/o sob and leg swelling. Continue iv bumex and metolazone. Echo reviewed. Discussed palliative option. Patient also c/o diarrhea. No abx given during hospitalization. tx sx and obtain stool cx  · 5/31: denies CP, SOB. Reports leg swelling same. Diarrhea resolved. Palliative on board. Will ask to revisit hospice care      Interval History: continues on supp O2. Reports SOB and leg swelling and diarrhea. Consult palliative care. Continue diuresing     Review of Systems   Constitutional: Negative for appetite change and fever.   Respiratory: Positive for shortness of breath.    Cardiovascular: Positive for leg swelling. Negative for chest pain.   Gastrointestinal: Negative for abdominal pain, diarrhea, nausea, rectal pain and vomiting.   Genitourinary: Negative for difficulty urinating.     Objective:     Vital Signs (Most Recent):  Temp: 97.6 °F (36.4 °C) (05/31/20 1226)  Pulse: 68 (05/31/20 1226)  Resp: 18 (05/31/20 1226)  BP: 95/67 (05/31/20 1226)  SpO2: 95 % (05/31/20 1226) Vital Signs (24h Range):  Temp:  [96.9 °F (36.1 °C)-98.1 °F (36.7 °C)] 97.6 °F (36.4 °C)  Pulse:  [63-76] 68  Resp:  [17-22] 18  SpO2:  [94 %-100 %] 95 %  BP: ()/(65-79) 95/67     Weight: 105.3 kg (232 lb 2.3 oz)  Body mass index is 35.3 kg/m².    Intake/Output Summary (Last 24 hours) at 5/31/2020 1402  Last data filed at 5/31/2020 0600  Gross per 24 hour   Intake 1726 ml   Output 900 ml   Net 826 ml      Physical Exam   Constitutional: He appears well-developed and well-nourished. No distress.   HENT:   Head: Normocephalic and atraumatic.   Eyes: Right eye  exhibits abnormal extraocular motion.   Right eye prosthesis   Neck: Normal range of motion.   Cardiovascular: Normal rate.   Pulmonary/Chest: He is in respiratory distress.   Abdominal: Soft. He exhibits distension.   Musculoskeletal: Normal range of motion. He exhibits edema (b/l LE).   Neurological: He is alert.   Skin: Skin is warm and dry.   Psychiatric: He has a normal mood and affect. His behavior is normal.   Vitals reviewed.      Significant Labs:   CBC:   Recent Labs   Lab 05/30/20  0536   WBC 5.34   HGB 12.8*   HCT 42.0        CMP:   Recent Labs   Lab 05/30/20  0536 05/31/20  0530    136   K 4.1 3.8   CL 99 99   CO2 26 28   GLU 69* 48*   BUN 37* 39*   CREATININE 3.0* 2.9*   CALCIUM 9.1 8.7   PROT 7.0  --    ALBUMIN 3.1*  --    BILITOT 1.1*  --    ALKPHOS 98  --    AST 44*  --    ALT 23  --    ANIONGAP 12 9   EGFRNONAA 21* 21*       Significant Imaging: CXR: I have reviewed all pertinent results/findings within the past 24 hours and my personal findings are:  no acute findings      Assessment/Plan:      * Acute on chronic combined systolic and diastolic  heart failure with EF 15-20%  - Initiate CHF pathway   -Bumex 3 mg IV BID   -Start BB  -Continue Entresto   -Strict intake /Output   - Salt restriction 2 gram /day   -Fluid restriction 1.5 L/day   5/29- Metolazone added per Cardiology.  Assess response   5/30:   continues to have dyspnea and volume overload  Now with diarrhea  5/31  Appears dyspneic but not worsening per patient  Improving with iv diuresing   Palliative consulted for goals of care  May need to revisit hospice rec  Once transitions to PO diuretic, discharge   Physical therapy/ot consult for possible placement    Elevated LFTs  - Likely hepatic congestion due to acute on chronic CHF( HFrEF)   -Expect to improve with diuresis     5/30  From hepatic congestion  Slightly elevated but trending down  Continue to monitor    5/31  Improving with diuresis       Type 2 diabetes mellitus,  with long-term current use of insulin  - continue basal  Insulin and meal time bolus per sliding scale   -HgA1c 6.0 ( 8 mos ago) suggestive of good out patient control     5/31  Stable  Avoid hypoglycemic episodes  Hypoglycemic protocol  Avoid nephrotoxic agents      Biventricular ICD (implantable cardioverter-defibrillator) in place  - S/P CRT-ICD  - H/O ischemic Cardiomyopathy , EF 15 to 20%     5/30  Palliative consult  Cards on board  Started metolazone     5/31  Keep POC      CKD (chronic kidney disease) stage 4, GFR 15-29 ml/min  - Monitor renal indices while on aggressive diuretic  regimen     5/30  Monitor  Consider neph consult  Palliative has been consulted for goals of care and possible hospice rec    5/31  Stable   Consider neph consult if worsens  Receiving IV diurectics        Pulmonary HTN  - Continue Diuresis   - supplemental O2 to keep O2 sat 92% or higher   - Home O2 eval pending clinical course   -H/O Sleep Apnea     5/30  Continue diuresing   supp O2    5/31  Requires supp O2   Diuresing with iv diuretic         Unspecified essential hypertension  - Resume Home med and adjust dose accordingly     5/30  Continue poc    5/31  Improved  normotensive        VTE Risk Mitigation (From admission, onward)         Ordered     enoxaparin injection 30 mg  Daily      05/28/20 7126                      Tommy Christopher MD  Department of Hospital Medicine   [facility]

## 2020-05-31 NOTE — ASSESSMENT & PLAN NOTE
- Resume Home med and adjust dose accordingly     5/30  Continue poc    5/31  Improved  normotensive

## 2020-05-31 NOTE — ASSESSMENT & PLAN NOTE
-Presented with decompensated combined CHF, in part due to dietary indiscretion  -Continue IV diuresis  -Add 5 mg of metolazone today and assess response  -Consider Lasix gtt if no improvement  -Continue Entresto, Coreg  -Repeat echo pending  -MPI stress test 6/19 negative for ischemia, + fixed defects    5/30   DIURESIS IN PROGRESS IMPROVING CLINICALLY.    5/31/202   SLOWLY IMPROVING  WITH CURRENT REGIMEN.

## 2020-05-31 NOTE — PROGRESS NOTES
"Pt took less than 2 min of neb before refusing stating he was going home because he "has diarrhea". RN called to room and made aware. RT able to calm patient for now, back in bed with 2 nc on. Pt biggest complaint at this time is diarrhea. No distress noted at this time, pt does continue to have upper airway wheeze with excertion. Pt denies any sob at this time.   "

## 2020-05-31 NOTE — ASSESSMENT & PLAN NOTE
- Initiate CHF pathway   -Bumex 3 mg IV BID   -Start BB  -Continue Entresto   -Strict intake /Output   - Salt restriction 2 gram /day   -Fluid restriction 1.5 L/day   5/29- Metolazone added per Cardiology.  Assess response   5/30:   continues to have dyspnea and volume overload  Now with diarrhea  5/31  Appears dyspneic but not worsening per patient  Improving with iv diuresing   Palliative consulted for goals of care  May need to revisit hospice rec  Once transitions to PO diuretic, discharge   Physical therapy/ot consult for possible placement

## 2020-05-31 NOTE — SUBJECTIVE & OBJECTIVE
Interval History: continues on supp O2. Reports SOB and leg swelling and diarrhea. Consult palliative care. Continue diuresing     Review of Systems   Constitutional: Negative for appetite change and fever.   Respiratory: Positive for shortness of breath.    Cardiovascular: Positive for leg swelling. Negative for chest pain.   Gastrointestinal: Negative for abdominal pain, diarrhea, nausea, rectal pain and vomiting.   Genitourinary: Negative for difficulty urinating.     Objective:     Vital Signs (Most Recent):  Temp: 97.6 °F (36.4 °C) (05/31/20 1226)  Pulse: 68 (05/31/20 1226)  Resp: 18 (05/31/20 1226)  BP: 95/67 (05/31/20 1226)  SpO2: 95 % (05/31/20 1226) Vital Signs (24h Range):  Temp:  [96.9 °F (36.1 °C)-98.1 °F (36.7 °C)] 97.6 °F (36.4 °C)  Pulse:  [63-76] 68  Resp:  [17-22] 18  SpO2:  [94 %-100 %] 95 %  BP: ()/(65-79) 95/67     Weight: 105.3 kg (232 lb 2.3 oz)  Body mass index is 35.3 kg/m².    Intake/Output Summary (Last 24 hours) at 5/31/2020 1402  Last data filed at 5/31/2020 0600  Gross per 24 hour   Intake 1726 ml   Output 900 ml   Net 826 ml      Physical Exam   Constitutional: He appears well-developed and well-nourished. No distress.   HENT:   Head: Normocephalic and atraumatic.   Eyes: Right eye exhibits abnormal extraocular motion.   Right eye prosthesis   Neck: Normal range of motion.   Cardiovascular: Normal rate.   Pulmonary/Chest: He is in respiratory distress.   Abdominal: Soft. He exhibits distension.   Musculoskeletal: Normal range of motion. He exhibits edema (b/l LE).   Neurological: He is alert.   Skin: Skin is warm and dry.   Psychiatric: He has a normal mood and affect. His behavior is normal.   Vitals reviewed.      Significant Labs:   CBC:   Recent Labs   Lab 05/30/20  0536   WBC 5.34   HGB 12.8*   HCT 42.0        CMP:   Recent Labs   Lab 05/30/20  0536 05/31/20  0530    136   K 4.1 3.8   CL 99 99   CO2 26 28   GLU 69* 48*   BUN 37* 39*   CREATININE 3.0* 2.9*    CALCIUM 9.1 8.7   PROT 7.0  --    ALBUMIN 3.1*  --    BILITOT 1.1*  --    ALKPHOS 98  --    AST 44*  --    ALT 23  --    ANIONGAP 12 9   EGFRNONAA 21* 21*       Significant Imaging: CXR: I have reviewed all pertinent results/findings within the past 24 hours and my personal findings are:  no acute findings

## 2020-05-31 NOTE — SUBJECTIVE & OBJECTIVE
Interval History: LESS SHORT OF BRAETH AND LESS SWOLLEN LEGS.    Review of Systems   Constitution: Negative for malaise/fatigue.   Eyes: Negative for blurred vision.   Cardiovascular: Negative for chest pain, claudication, cyanosis, dyspnea on exertion, irregular heartbeat, leg swelling, near-syncope, orthopnea, palpitations and paroxysmal nocturnal dyspnea.   Respiratory: Negative for cough, hemoptysis and shortness of breath.    Hematologic/Lymphatic: Negative for bleeding problem. Does not bruise/bleed easily.   Skin: Negative for dry skin and itching.   Musculoskeletal: Negative for falls, muscle weakness and myalgias.   Gastrointestinal: Negative for abdominal pain, diarrhea, heartburn, hematemesis, hematochezia and melena.   Genitourinary: Negative for flank pain and hematuria.   Neurological: Negative for dizziness, focal weakness, headaches, light-headedness, numbness, paresthesias, seizures and weakness.   Psychiatric/Behavioral: Negative for altered mental status and memory loss. The patient is not nervous/anxious.    Allergic/Immunologic: Negative for hives.     Objective:     Vital Signs (Most Recent):  Temp: 97.6 °F (36.4 °C) (05/31/20 1226)  Pulse: 68 (05/31/20 1226)  Resp: 18 (05/31/20 1226)  BP: 95/67 (05/31/20 1226)  SpO2: 95 % (05/31/20 1226) Vital Signs (24h Range):  Temp:  [96.9 °F (36.1 °C)-98.1 °F (36.7 °C)] 97.6 °F (36.4 °C)  Pulse:  [63-76] 68  Resp:  [17-22] 18  SpO2:  [94 %-100 %] 95 %  BP: ()/(65-79) 95/67     Weight: 105.3 kg (232 lb 2.3 oz)  Body mass index is 35.3 kg/m².     SpO2: 95 %  O2 Device (Oxygen Therapy): nasal cannula      Intake/Output Summary (Last 24 hours) at 5/31/2020 1244  Last data filed at 5/31/2020 0600  Gross per 24 hour   Intake 1726 ml   Output 900 ml   Net 826 ml       Lines/Drains/Airways     Peripheral Intravenous Line                 Peripheral IV - Single Lumen 05/28/20 1601 22 G Right Wrist 2 days                Physical Exam   Constitutional: He is  oriented to person, place, and time. He appears well-developed and well-nourished. No distress.   HENT:   Head: Normocephalic and atraumatic.   Eyes: Pupils are equal, round, and reactive to light. EOM are normal. Right eye exhibits no discharge. Left eye exhibits no discharge.   Neck: Neck supple. No JVD present. No thyromegaly present.   Cardiovascular: Normal rate, regular rhythm, normal heart sounds and intact distal pulses. Exam reveals no gallop and no friction rub.   No murmur heard.  Pulmonary/Chest: Effort normal and breath sounds normal. No respiratory distress. He has no wheezes. He has no rales. He exhibits no tenderness.   DECREASE BREATH SOUNDS AT BASES.   Abdominal: Soft. Bowel sounds are normal. He exhibits no distension. There is no tenderness.   Musculoskeletal: Normal range of motion. He exhibits edema (1+ BETETR THAN YESTERDAY.).   Neurological: He is alert and oriented to person, place, and time. No cranial nerve deficit.   Skin: Skin is warm and dry. No rash noted. He is not diaphoretic. No erythema.   Psychiatric: He has a normal mood and affect. His behavior is normal.   Nursing note and vitals reviewed.      Significant Labs:   Recent Lab Results       05/31/20  1117   05/31/20  0641   05/31/20  0620   05/31/20  0533   05/31/20  0530        Anion Gap         9     BUN, Bld         39     Calcium         8.7     Chloride         99     CO2         28     Creatinine         2.9     eGFR if          25     eGFR if non          21  Comment:  Calculation used to obtain the estimated glomerular filtration  rate (eGFR) is the CKD-EPI equation.        Glucose         48  Comment:  Glucose critical result(s) called and verbal readback obtained from   Nicko Fan RN by [facility] 05/31/2020 07:01       POCT Glucose 106 82 64 50       Potassium         3.8     Sodium         136                      05/30/20  2120   05/30/20  1615        Anion Gap         BUN, Bld          Calcium         Chloride         CO2         Creatinine         eGFR if          eGFR if non African American         Glucose         POCT Glucose 150 160     Potassium         Sodium

## 2020-05-31 NOTE — ASSESSMENT & PLAN NOTE
- Monitor renal indices while on aggressive diuretic  regimen     5/30  Monitor  Consider neph consult  Palliative has been consulted for goals of care and possible hospice rec    5/31  Stable   Consider neph consult if worsens  Receiving IV diurectics

## 2020-06-01 PROBLEM — Z51.5 ENCOUNTER FOR PALLIATIVE CARE: Status: ACTIVE | Noted: 2020-06-01

## 2020-06-01 LAB
ANION GAP SERPL CALC-SCNC: 12 MMOL/L (ref 8–16)
BUN SERPL-MCNC: 41 MG/DL (ref 8–23)
CALCIUM SERPL-MCNC: 8.6 MG/DL (ref 8.7–10.5)
CHLORIDE SERPL-SCNC: 99 MMOL/L (ref 95–110)
CO2 SERPL-SCNC: 26 MMOL/L (ref 23–29)
CREAT SERPL-MCNC: 3.1 MG/DL (ref 0.5–1.4)
E COLI SXT1 STL QL IA: NEGATIVE
E COLI SXT2 STL QL IA: NEGATIVE
EST. GFR  (AFRICAN AMERICAN): 23 ML/MIN/1.73 M^2
EST. GFR  (NON AFRICAN AMERICAN): 20 ML/MIN/1.73 M^2
GLUCOSE SERPL-MCNC: 86 MG/DL (ref 70–110)
POCT GLUCOSE: 107 MG/DL (ref 70–110)
POCT GLUCOSE: 132 MG/DL (ref 70–110)
POCT GLUCOSE: 137 MG/DL (ref 70–110)
POCT GLUCOSE: 95 MG/DL (ref 70–110)
POTASSIUM SERPL-SCNC: 4.2 MMOL/L (ref 3.5–5.1)
SODIUM SERPL-SCNC: 137 MMOL/L (ref 136–145)

## 2020-06-01 PROCEDURE — 63600175 PHARM REV CODE 636 W HCPCS: Performed by: INTERNAL MEDICINE

## 2020-06-01 PROCEDURE — 25000242 PHARM REV CODE 250 ALT 637 W/ HCPCS: Performed by: INTERNAL MEDICINE

## 2020-06-01 PROCEDURE — 80048 BASIC METABOLIC PNL TOTAL CA: CPT

## 2020-06-01 PROCEDURE — 94761 N-INVAS EAR/PLS OXIMETRY MLT: CPT

## 2020-06-01 PROCEDURE — 97162 PT EVAL MOD COMPLEX 30 MIN: CPT

## 2020-06-01 PROCEDURE — 99900038 HC OT GENERIC THERAPY SCREENING (STAT)

## 2020-06-01 PROCEDURE — 99233 PR SUBSEQUENT HOSPITAL CARE,LEVL III: ICD-10-PCS | Mod: ,,, | Performed by: INTERNAL MEDICINE

## 2020-06-01 PROCEDURE — 25000003 PHARM REV CODE 250: Performed by: PHYSICIAN ASSISTANT

## 2020-06-01 PROCEDURE — 99223 PR INITIAL HOSPITAL CARE,LEVL III: ICD-10-PCS | Mod: ,,, | Performed by: PHYSICIAN ASSISTANT

## 2020-06-01 PROCEDURE — 99233 SBSQ HOSP IP/OBS HIGH 50: CPT | Mod: ,,, | Performed by: INTERNAL MEDICINE

## 2020-06-01 PROCEDURE — 97165 OT EVAL LOW COMPLEX 30 MIN: CPT

## 2020-06-01 PROCEDURE — 25000242 PHARM REV CODE 250 ALT 637 W/ HCPCS: Performed by: FAMILY MEDICINE

## 2020-06-01 PROCEDURE — 94640 AIRWAY INHALATION TREATMENT: CPT

## 2020-06-01 PROCEDURE — 99223 1ST HOSP IP/OBS HIGH 75: CPT | Mod: ,,, | Performed by: PHYSICIAN ASSISTANT

## 2020-06-01 PROCEDURE — 25000003 PHARM REV CODE 250: Performed by: INTERNAL MEDICINE

## 2020-06-01 PROCEDURE — 21400001 HC TELEMETRY ROOM

## 2020-06-01 PROCEDURE — 27000221 HC OXYGEN, UP TO 24 HOURS

## 2020-06-01 PROCEDURE — S0171 BUMETANIDE 0.5 MG: HCPCS | Performed by: INTERNAL MEDICINE

## 2020-06-01 PROCEDURE — 96372 THER/PROPH/DIAG INJ SC/IM: CPT

## 2020-06-01 PROCEDURE — 97116 GAIT TRAINING THERAPY: CPT

## 2020-06-01 PROCEDURE — 36415 COLL VENOUS BLD VENIPUNCTURE: CPT

## 2020-06-01 RX ADMIN — BUDESONIDE 0.5 MG: 0.5 INHALANT RESPIRATORY (INHALATION) at 07:06

## 2020-06-01 RX ADMIN — LEVALBUTEROL HYDROCHLORIDE 0.63 MG: 0.63 SOLUTION RESPIRATORY (INHALATION) at 07:06

## 2020-06-01 RX ADMIN — METOLAZONE 5 MG: 5 TABLET ORAL at 09:06

## 2020-06-01 RX ADMIN — ENOXAPARIN SODIUM 30 MG: 100 INJECTION SUBCUTANEOUS at 06:06

## 2020-06-01 RX ADMIN — TIMOLOL MALEATE 1 DROP: 5 SOLUTION OPHTHALMIC at 09:06

## 2020-06-01 RX ADMIN — BUMETANIDE 3 MG: 0.25 INJECTION INTRAMUSCULAR; INTRAVENOUS at 09:06

## 2020-06-01 RX ADMIN — PANTOPRAZOLE SODIUM 40 MG: 40 TABLET, DELAYED RELEASE ORAL at 09:06

## 2020-06-01 RX ADMIN — TIMOLOL MALEATE 1 DROP: 5 SOLUTION OPHTHALMIC at 10:06

## 2020-06-01 RX ADMIN — SACUBITRIL AND VALSARTAN 1 TABLET: 24; 26 TABLET, FILM COATED ORAL at 10:06

## 2020-06-01 RX ADMIN — LATANOPROST 1 DROP: 50 SOLUTION OPHTHALMIC at 10:06

## 2020-06-01 RX ADMIN — SIMVASTATIN 10 MG: 5 TABLET, FILM COATED ORAL at 10:06

## 2020-06-01 RX ADMIN — BRIMONIDINE TARTRATE 1 DROP: 1.5 SOLUTION OPHTHALMIC at 09:06

## 2020-06-01 RX ADMIN — CARVEDILOL 3.12 MG: 3.12 TABLET, FILM COATED ORAL at 09:06

## 2020-06-01 RX ADMIN — BRIMONIDINE TARTRATE 1 DROP: 1.5 SOLUTION OPHTHALMIC at 10:06

## 2020-06-01 RX ADMIN — ASPIRIN 81 MG: 81 TABLET, COATED ORAL at 06:06

## 2020-06-01 RX ADMIN — SACUBITRIL AND VALSARTAN 1 TABLET: 24; 26 TABLET, FILM COATED ORAL at 09:06

## 2020-06-01 RX ADMIN — ALLOPURINOL 100 MG: 100 TABLET ORAL at 09:06

## 2020-06-01 RX ADMIN — CARVEDILOL 3.12 MG: 3.12 TABLET, FILM COATED ORAL at 06:06

## 2020-06-01 NOTE — ASSESSMENT & PLAN NOTE
- Resume Home med and adjust dose accordingly     5/30  Continue poc    5/31, 6/1  Improved  normotensive

## 2020-06-01 NOTE — ASSESSMENT & PLAN NOTE
- S/P CRT-ICD  - H/O ischemic Cardiomyopathy , EF 15 to 20%     5/30  Palliative consult  Cards on board  Started metolazone     5/31, 6/1  Keep POC

## 2020-06-01 NOTE — ASSESSMENT & PLAN NOTE
- Monitor renal indices while on aggressive diuretic  regimen     5/30  Monitor  Consider neph consult  Palliative has been consulted for goals of care and possible hospice rec    5/31  Stable   Consider neph consult if worsens  Receiving IV diurectics  6/1  Stable   Monitor   Transitioning to PO lasix possibly tomorrow

## 2020-06-01 NOTE — ASSESSMENT & PLAN NOTE
- Continue Diuresis   - supplemental O2 to keep O2 sat 92% or higher   - Home O2 eval pending clinical course   -H/O Sleep Apnea     5/30  Continue diuresing   supp O2    5/31  Requires supp O2   Diuresing with iv diuretic     6/1  Likely transition to PO diuretic tomorrow and d/c home with h/h

## 2020-06-01 NOTE — SUBJECTIVE & OBJECTIVE
Interval History: no acute issues noted o/n. Has diuresed 1.7L in past 24 hours. Needs continued IV diuresis for another day, reassess in AM    Review of Systems   Constitution: Positive for malaise/fatigue.   HENT: Negative for hearing loss and hoarse voice.    Eyes: Negative for blurred vision and visual disturbance.   Cardiovascular: Positive for leg swelling. Negative for chest pain, claudication, dyspnea on exertion, irregular heartbeat, near-syncope, orthopnea, palpitations, paroxysmal nocturnal dyspnea and syncope.   Respiratory: Negative for cough, hemoptysis, shortness of breath, sleep disturbances due to breathing, snoring and wheezing.    Endocrine: Negative for cold intolerance and heat intolerance.   Hematologic/Lymphatic: Does not bruise/bleed easily.   Skin: Negative for color change, dry skin and nail changes.   Musculoskeletal: Positive for arthritis and back pain. Negative for joint pain and myalgias.   Gastrointestinal: Negative for bloating, abdominal pain, constipation, nausea and vomiting.   Genitourinary: Negative for dysuria, flank pain, hematuria and hesitancy.   Neurological: Positive for weakness. Negative for headaches, light-headedness, loss of balance, numbness and paresthesias.   Psychiatric/Behavioral: Negative for altered mental status.   Allergic/Immunologic: Negative for environmental allergies.     Objective:     Vital Signs (Most Recent):  Temp: 97.7 °F (36.5 °C) (06/01/20 1120)  Pulse: 74 (06/01/20 1120)  Resp: 20 (06/01/20 0751)  BP: 103/72 (06/01/20 1120)  SpO2: 100 % (06/01/20 1120) Vital Signs (24h Range):  Temp:  [96.2 °F (35.7 °C)-97.9 °F (36.6 °C)] 97.7 °F (36.5 °C)  Pulse:  [60-87] 74  Resp:  [17-22] 20  SpO2:  [95 %-100 %] 100 %  BP: ()/(65-89) 103/72     Weight: 103.5 kg (228 lb 2.8 oz)  Body mass index is 34.69 kg/m².     SpO2: 100 %  O2 Device (Oxygen Therapy): room air      Intake/Output Summary (Last 24 hours) at 6/1/2020 1130  Last data filed at 6/1/2020  0500  Gross per 24 hour   Intake 948 ml   Output 1750 ml   Net -802 ml       Lines/Drains/Airways     Peripheral Intravenous Line                 Peripheral IV - Single Lumen 05/28/20 1601 22 G Right Wrist 3 days                Physical Exam   Constitutional: He is oriented to person, place, and time. He appears well-developed and well-nourished. No distress.   HENT:   Head: Normocephalic and atraumatic.   Eyes: Pupils are equal, round, and reactive to light.   Neck: Normal range of motion and full passive range of motion without pain. Neck supple. No JVD present.   Cardiovascular: Normal rate, regular rhythm, S1 normal, S2 normal and intact distal pulses. PMI is not displaced. Exam reveals no distant heart sounds.   No murmur heard.  Pulses:       Radial pulses are 2+ on the right side, and 2+ on the left side.        Dorsalis pedis pulses are 2+ on the right side, and 2+ on the left side.   Pulmonary/Chest: Effort normal. No accessory muscle usage. No respiratory distress. He has decreased breath sounds in the right lower field and the left lower field. He has no wheezes. He has no rales.   Abdominal: Soft. Bowel sounds are normal. He exhibits no distension. There is no tenderness.   Musculoskeletal: Normal range of motion. He exhibits edema.        Right ankle: He exhibits swelling.        Left ankle: He exhibits swelling.   Neurological: He is alert and oriented to person, place, and time.   Skin: Skin is warm and dry. He is not diaphoretic. No cyanosis. Nails show no clubbing.   Psychiatric: He has a normal mood and affect. His speech is normal and behavior is normal. Judgment and thought content normal. Cognition and memory are normal.   Nursing note and vitals reviewed.      Significant Labs:   BMP:   Recent Labs   Lab 05/31/20  0530 06/01/20  0503   GLU 48* 86    137   K 3.8 4.2   CL 99 99   CO2 28 26   BUN 39* 41*   CREATININE 2.9* 3.1*   CALCIUM 8.7 8.6*   , CBC No results for input(s): WBC, HGB,  HCT, PLT in the last 48 hours., Troponin No results for input(s): TROPONINI in the last 48 hours. and All pertinent lab results from the last 24 hours have been reviewed.    Significant Imaging: Echocardiogram:   Transthoracic echo (TTE) complete (Cupid Only):   Results for orders placed or performed during the hospital encounter of 05/28/20   Echo Color Flow Doppler? Yes   Result Value Ref Range    Ascending aorta 3.50 cm    STJ 3.56 cm    AV mean gradient 2 mmHg    Ao peak luis f 0.88 m/s    Ao VTI 16.61 cm    IVRT 78.43 msec    IVS 1.11 (A) 0.6 - 1.1 cm    LA size 4.16 cm    Left Atrium Major Axis 5.28 cm    Left Atrium Minor Axis 5.18 cm    LVIDD 5.55 3.5 - 6.0 cm    LVIDS 5.30 (A) 2.1 - 4.0 cm    LVOT diameter 2.01 cm    LVOT peak VTI 16.95 cm    PW 1.02 0.6 - 1.1 cm    MV Peak A Luis F 0.30 m/s    E wave decelartion time 141.72 msec    MV Peak E Luis F 0.79 m/s    RA Major Axis 4.80 cm    RVDD 3.62 cm    Sinus 3.66 cm    TAPSE 1.21 cm    TR Max Luis F 3.46 m/s    Ao root annulus 3.63 cm    LV Diastolic Volume 150.25 mL    LV Systolic Volume 135.34 mL    LVOT peak luis f 0.69 m/s    LA WIDTH 3.58 cm    RA Width 4.13 cm    FS 5 %    LA volume 66.20 cm3    LV mass 235.25 g    Left Ventricle Relative Wall Thickness 0.37 cm    AV valve area 3.24 cm2    AV Velocity Ratio 0.78     AV index (prosthetic) 1.02     E/A ratio 2.63     LVOT area 3.2 cm2    LVOT stroke volume 53.76 cm3    AV peak gradient 3 mmHg    LV Systolic Volume Index 62.2 mL/m2    LV Diastolic Volume Index 69.02 mL/m2    LA Volume Index 30.4 mL/m2    LV Mass Index 108 g/m2    Triscuspid Valve Regurgitation Peak Gradient 48 mmHg    BSA 2.25 m2    Right Atrial Pressure (from IVC) 15 mmHg    TV rest pulmonary artery pressure 63 mmHg    Narrative    · Mild left ventricular enlargement.  · Left ventricular systolic function. The estimated ejection fraction is   15%.  · Grade III (severe) left ventricular diastolic dysfunction consistent   with restrictive physiology.  ·  Severe global hypokinetic wall motion.  · Moderate right ventricular enlargement.  · Moderately reduced right ventricular systolic function.  · Moderate to severe tricuspid regurgitation.  · Mild to moderate pulmonic regurgitation.  · Mild aortic regurgitation.  · Elevated central venous pressure (15 mmHg).  · The estimated PA systolic pressure is 63 mmHg.  · Pulmonary hypertension present.       and X-Ray: CXR: X-Ray Chest 1 View (CXR): No results found for this visit on 05/28/20. and X-Ray Chest PA and Lateral (CXR): No results found for this visit on 05/28/20.

## 2020-06-01 NOTE — PT/OT/SLP EVAL
Physical Therapy Evaluation    Patient Name:  Yan Choudhury   MRN:  [id number]    Recommendations:     Discharge Recommendations:  nursing facility, skilled, home health PT(DEPENDING ON PROGRESS WITH POC)   Discharge Equipment Recommendations: walker, rolling(TBD WITH PROGRESS)   Barriers to discharge: None    Assessment:     Yan Choudhury is a 65 y.o. male admitted with a medical diagnosis of Acute on chronic systolic heart failure.  He presents with the following impairments/functional limitations:  weakness, impaired endurance, impaired functional mobilty, gait instability, impaired balance, decreased coordination, decreased safety awareness, edema .    Rehab Prognosis: Good; patient would benefit from acute skilled PT services to address these deficits and reach maximum level of function.    Recent Surgery: * No surgery found *     Plan:     During this hospitalization, patient to be seen 5 x/week to address the identified rehab impairments via gait training, therapeutic activities, therapeutic exercises and progress toward the following goals:    · Plan of Care Expires:  06/08/20    Subjective     Chief Complaint:   Patient/Family Comments/goals:   Pain/Comfort:  Pain Rating 1: 0/10    Patients cultural, spiritual, Buddhist conflicts given the current situation:      Living Environment:  PT LIVES WITH WIFE 1 STORY HOUSE NO STEPS, AMB INDEP COMMUNITY DISTANCES, STILL DRIVES, DOES NOT WORK, INDEP WITH ADL'S  Prior to admission, patients level of function was WILLIE.  Equipment used at home: none.  DME owned (not currently used): none.  Upon discharge, patient will have assistance from ?.    Objective:     Communicated with NURSE ESTRADA prior to session.  Patient found supine with telemetry, peripheral IV, bed alarm(TEJAS SYS)  upon PT entry to room.    General Precautions: Standard, fall, respiratory, vision impaired(INCREASED SOB WITH EXERTION ON ROOM AIR)   Orthopedic Precautions:N/A   Braces: N/A      Exams:  · Cognitive Exam:  Patient is oriented to Person, Place, Time, Situation and PT WITH VERY QUICK PACE SLURRED SPEECH  · Postural Exam:  Patient presented with the following abnormalities:    · -       Rounded shoulders  · Sensation:    · -       Intact  · Skin Integrity/Edema:      · -       Edema: Moderate BLE  · RLE ROM: WFL  · RLE Strength: GROSSLY 3+/5  · LLE ROM: WFL  · LLE Strength: GROSSLY 3+/5    Functional Mobility:  · Bed Mobility:     · Rolling Left:  stand by assistance  · Scooting: stand by assistance  · Supine to Sit: stand by assistance  · Transfers:     · Sit to Stand:  contact guard assistance with no AD  · Bed to Chair: contact guard assistance with  no AD  using  Step Transfer  · Toilet Transfer: contact guard assistance with  no AD  using  Step Transfer  · Gait: PT AMB 50' NO AD WITH SLOW SHUFFLING STEPS AND MIN/CGA, MILDLY UNSTEADY BUT NOT GROSS LOB, QUICK TO FATIGUE, INCREASED SOB ON ROOM AIR  · Balance: POOR+    Therapeutic Activities and Exercises:   PT EDUCATED IN ROLE OF P.T. AND POC, PT DECLINED RW USE HOWEVER GAIT UNSTEADY WITHOUT RW USE, PT EDUCATED IN BLE THEREX TO PERFORM WHILE SEATED IN CHAIR, PT ASSISTED TO BATHROOM WITH CGA, NO ASSIST FOR TOILETING    AM-PAC 6 CLICK MOBILITY  Total Score:18     Patient left up in chair with all lines intact, call button in reach, chair alarm on and NURSE notified.    GOALS:   Multidisciplinary Problems     Physical Therapy Goals        Problem: Physical Therapy Goal    Goal Priority Disciplines Outcome Goal Variances Interventions   Physical Therapy Goal     PT, PT/OT      Description:  LTG'S TO BE MET IN 7 DAYS (6-8-20)  1. PT WILL REQUIRE SPV FOR BED MOBILITY  2. PT WILL REQUIRE SPV FOR TF'S  3. PT WILL ' WITH OR WITHOUT RW AND SBA                    History:     Past Medical History:   Diagnosis Date    Arthritis     CAD (coronary artery disease)     Cataract     Chronic combined systolic and diastolic congestive heart  failure 3/24/2015    CKD (chronic kidney disease), stage III     Diabetes mellitus type II      AM    Diabetic retinopathy     anti Veg F injections for macular edema    Diverticulitis of colon     ED (erectile dysfunction)     Encounter for blood transfusion     Glaucoma     HTN (hypertension)     Hyperlipidemia     Ischemic cardiomyopathy     Obesity     JAHAIRA on CPAP     Pacemaker     Pulmonary HTN        Past Surgical History:   Procedure Laterality Date    CARDIAC CATHETERIZATION  2009    CHOLECYSTECTOMY      COLON SURGERY      Sigmoid resection    COLONOSCOPY N/A 10/11/2018    Procedure: COLONOSCOPY;  Surgeon: Quinn Aragon MD;  Location: [facility] ENDO;  Service: General;  Laterality: N/A;    EYE SURGERY      FLEXIBLE SIGMOIDOSCOPY N/A 1/18/2019    Procedure: SIGMOIDOSCOPY, FLEXIBLE;  Surgeon: Quinn Aragon MD;  Location: [facility] ENDO;  Service: General;  Laterality: N/A;    ILEOSTOMY N/A 10/30/2018    Procedure: CREATION, ILEOSTOMY;  Surgeon: Kevin Lindsay MD;  Location: [facility] OR;  Service: General;  Laterality: N/A;    Ileostomy reversal  01/2019    KNEE SURGERY      MOBILIZATION OF SPLENIC FLEXURE N/A 10/30/2018    Procedure: MOBILIZATION, SPLENIC FLEXURE;  Surgeon: Kevin Lindsay MD;  Location: [facility] OR;  Service: General;  Laterality: N/A;    REVISION COLOSTOMY N/A 10/30/2018    Procedure: REVISION OR CLOSURE, COLOSTOMY;  Surgeon: Kevin Lindsay MD;  Location: [facility] OR;  Service: General;  Laterality: N/A;  Parastomal Hernia Repair    RIGHT HEMICOLECTOMY Right 10/30/2018    Procedure: HEMICOLECTOMY, RIGHT;  Surgeon: Kevin Lindsay MD;  Location: [facility] OR;  Service: General;  Laterality: Right;       Time Tracking:     PT Received On: 06/01/20  PT Start Time: 0730     PT Stop Time: 0755  PT Total Time (min): 25 min     Billable Minutes: Evaluation 15 and Gait Training 10    Ilda Gould, PT  06/01/2020

## 2020-06-01 NOTE — ASSESSMENT & PLAN NOTE
-Creatinine 3.0, likely cardiorenal  -Monitor with diuresis  -Consider nephrology consult    6/1  -Monitor closely with need for continued IV diuresis.

## 2020-06-01 NOTE — PROGRESS NOTES
[facility]  Cardiology  Progress Note    Patient Name: Yan Choudhury  MRN: [id number]  Admission Date: 5/28/2020  Hospital Length of Stay: 3 days  Code Status: Full Code   Attending Physician: Tommy Christopher MD   Primary Care Physician: Sandra Estevez MD  Expected Discharge Date:   Principal Problem:Acute on chronic systolic heart failure    Subjective:   HPI    Mr. Choudhury is a 65 year old male patient whose current medical conditions include combined CHF, CAD/ICM, LBBB, HTN, CRI, DM, CRT-CD, dyslipidemia, and obesity who presented to [facility] ED yesterday with a chief complaint of worsening SOB over the past two weeks. Associated symptoms included orthopnea, BLE edema, abdominal bloating, and a weight gain of 10 lbs. Patient denied any associated davion chest pain, palpitations, PND, near syncope, syncope, fever, or chills. Initial workup in ED revealed BNP > 3,000, creatinine of 3.0, and troponin of 0.061 and patient was subsequently admitted for further evaluation and treatment. Cardiology consulted to assist with management. Patient seen and examined today, sitting up in bed. +Conversational dyspnea noted. Admits to SOB and BLE edema. Remains chest pain free. He admits to dietary indiscretion, recently ate crawfish. He reports compliance with his medications. Chart reviewed. Troponin chronically elevated. Stress 6/19 negative for ischemia, + fixed defect. Repeat echo pending.      Hospital Course:   5/30/202 DIURESING FEELS BETTER LESS LEG SWELLING NO SHORTNESS OF BREATH.    5/31/2020   LEG SWELLING IMPROVED LESS SHORT OF BREATH.    6/1/20--Patient seen and examined in room, lying in bed. Feels ok today. Reports improvement in shortness of breath and leg swelling. Needs continued IV diuresis for another today. Labs reviewed, K+4.2, Cr 3.1.     Interval History: no acute issues noted o/n. Has diuresed 1.7L in past 24 hours. Needs continued IV diuresis for another day, reassess in AM    Review of Systems    Constitution: Positive for malaise/fatigue.   HENT: Negative for hearing loss and hoarse voice.    Eyes: Negative for blurred vision and visual disturbance.   Cardiovascular: Positive for leg swelling. Negative for chest pain, claudication, dyspnea on exertion, irregular heartbeat, near-syncope, orthopnea, palpitations, paroxysmal nocturnal dyspnea and syncope.   Respiratory: Negative for cough, hemoptysis, shortness of breath, sleep disturbances due to breathing, snoring and wheezing.    Endocrine: Negative for cold intolerance and heat intolerance.   Hematologic/Lymphatic: Does not bruise/bleed easily.   Skin: Negative for color change, dry skin and nail changes.   Musculoskeletal: Positive for arthritis and back pain. Negative for joint pain and myalgias.   Gastrointestinal: Negative for bloating, abdominal pain, constipation, nausea and vomiting.   Genitourinary: Negative for dysuria, flank pain, hematuria and hesitancy.   Neurological: Positive for weakness. Negative for headaches, light-headedness, loss of balance, numbness and paresthesias.   Psychiatric/Behavioral: Negative for altered mental status.   Allergic/Immunologic: Negative for environmental allergies.     Objective:     Vital Signs (Most Recent):  Temp: 97.7 °F (36.5 °C) (06/01/20 1120)  Pulse: 74 (06/01/20 1120)  Resp: 20 (06/01/20 0751)  BP: 103/72 (06/01/20 1120)  SpO2: 100 % (06/01/20 1120) Vital Signs (24h Range):  Temp:  [96.2 °F (35.7 °C)-97.9 °F (36.6 °C)] 97.7 °F (36.5 °C)  Pulse:  [60-87] 74  Resp:  [17-22] 20  SpO2:  [95 %-100 %] 100 %  BP: ()/(65-89) 103/72     Weight: 103.5 kg (228 lb 2.8 oz)  Body mass index is 34.69 kg/m².     SpO2: 100 %  O2 Device (Oxygen Therapy): room air      Intake/Output Summary (Last 24 hours) at 6/1/2020 1130  Last data filed at 6/1/2020 0500  Gross per 24 hour   Intake 948 ml   Output 1750 ml   Net -802 ml       Lines/Drains/Airways     Peripheral Intravenous Line                 Peripheral IV -  Single Lumen 05/28/20 1601 22 G Right Wrist 3 days                Physical Exam   Constitutional: He is oriented to person, place, and time. He appears well-developed and well-nourished. No distress.   HENT:   Head: Normocephalic and atraumatic.   Eyes: Pupils are equal, round, and reactive to light.   Neck: Normal range of motion and full passive range of motion without pain. Neck supple. No JVD present.   Cardiovascular: Normal rate, regular rhythm, S1 normal, S2 normal and intact distal pulses. PMI is not displaced. Exam reveals no distant heart sounds.   No murmur heard.  Pulses:       Radial pulses are 2+ on the right side, and 2+ on the left side.        Dorsalis pedis pulses are 2+ on the right side, and 2+ on the left side.   Pulmonary/Chest: Effort normal. No accessory muscle usage. No respiratory distress. He has decreased breath sounds in the right lower field and the left lower field. He has no wheezes. He has no rales.   Abdominal: Soft. Bowel sounds are normal. He exhibits no distension. There is no tenderness.   Musculoskeletal: Normal range of motion. He exhibits edema.        Right ankle: He exhibits swelling.        Left ankle: He exhibits swelling.   Neurological: He is alert and oriented to person, place, and time.   Skin: Skin is warm and dry. He is not diaphoretic. No cyanosis. Nails show no clubbing.   Psychiatric: He has a normal mood and affect. His speech is normal and behavior is normal. Judgment and thought content normal. Cognition and memory are normal.   Nursing note and vitals reviewed.      Significant Labs:   BMP:   Recent Labs   Lab 05/31/20  0530 06/01/20  0503   GLU 48* 86    137   K 3.8 4.2   CL 99 99   CO2 28 26   BUN 39* 41*   CREATININE 2.9* 3.1*   CALCIUM 8.7 8.6*   , CBC No results for input(s): WBC, HGB, HCT, PLT in the last 48 hours., Troponin No results for input(s): TROPONINI in the last 48 hours. and All pertinent lab results from the last 24 hours have been  reviewed.    Significant Imaging: Echocardiogram:   Transthoracic echo (TTE) complete (Cupid Only):   Results for orders placed or performed during the hospital encounter of 05/28/20   Echo Color Flow Doppler? Yes   Result Value Ref Range    Ascending aorta 3.50 cm    STJ 3.56 cm    AV mean gradient 2 mmHg    Ao peak luis f 0.88 m/s    Ao VTI 16.61 cm    IVRT 78.43 msec    IVS 1.11 (A) 0.6 - 1.1 cm    LA size 4.16 cm    Left Atrium Major Axis 5.28 cm    Left Atrium Minor Axis 5.18 cm    LVIDD 5.55 3.5 - 6.0 cm    LVIDS 5.30 (A) 2.1 - 4.0 cm    LVOT diameter 2.01 cm    LVOT peak VTI 16.95 cm    PW 1.02 0.6 - 1.1 cm    MV Peak A Luis F 0.30 m/s    E wave decelartion time 141.72 msec    MV Peak E Luis F 0.79 m/s    RA Major Axis 4.80 cm    RVDD 3.62 cm    Sinus 3.66 cm    TAPSE 1.21 cm    TR Max Luis F 3.46 m/s    Ao root annulus 3.63 cm    LV Diastolic Volume 150.25 mL    LV Systolic Volume 135.34 mL    LVOT peak luis f 0.69 m/s    LA WIDTH 3.58 cm    RA Width 4.13 cm    FS 5 %    LA volume 66.20 cm3    LV mass 235.25 g    Left Ventricle Relative Wall Thickness 0.37 cm    AV valve area 3.24 cm2    AV Velocity Ratio 0.78     AV index (prosthetic) 1.02     E/A ratio 2.63     LVOT area 3.2 cm2    LVOT stroke volume 53.76 cm3    AV peak gradient 3 mmHg    LV Systolic Volume Index 62.2 mL/m2    LV Diastolic Volume Index 69.02 mL/m2    LA Volume Index 30.4 mL/m2    LV Mass Index 108 g/m2    Triscuspid Valve Regurgitation Peak Gradient 48 mmHg    BSA 2.25 m2    Right Atrial Pressure (from IVC) 15 mmHg    TV rest pulmonary artery pressure 63 mmHg    Narrative    · Mild left ventricular enlargement.  · Left ventricular systolic function. The estimated ejection fraction is   15%.  · Grade III (severe) left ventricular diastolic dysfunction consistent   with restrictive physiology.  · Severe global hypokinetic wall motion.  · Moderate right ventricular enlargement.  · Moderately reduced right ventricular systolic function.  · Moderate to  severe tricuspid regurgitation.  · Mild to moderate pulmonic regurgitation.  · Mild aortic regurgitation.  · Elevated central venous pressure (15 mmHg).  · The estimated PA systolic pressure is 63 mmHg.  · Pulmonary hypertension present.       and X-Ray: CXR: X-Ray Chest 1 View (CXR): No results found for this visit on 05/28/20. and X-Ray Chest PA and Lateral (CXR): No results found for this visit on 05/28/20.    Assessment and Plan:     * Acute on chronic combined systolic and diastolic  heart failure with EF 15-20%  -Presented with decompensated combined CHF, in part due to dietary indiscretion  -Continue IV diuresis  -Add 5 mg of metolazone today and assess response  -Consider Lasix gtt if no improvement  -Continue Entresto, Coreg  -Repeat echo pending  -MPI stress test 6/19 negative for ischemia, + fixed defects    5/30   DIURESIS IN PROGRESS IMPROVING CLINICALLY.    5/31/202   SLOWLY IMPROVING  WITH CURRENT REGIMEN.    6/1  -Continue IV Bumex, BB, ASA, Metolazone, Entresto  -Dash diet, 2 gm sodium restriction  -1500 ml fluid restriction  -Daily weights  -Strict I/Os  -Reassess in AM    Elevated LFTs  -Secondary to hepatic congestion from CHF  -Monitor with diuresis    Type 2 diabetes mellitus, with long-term current use of insulin  -Mgmt as per primary team    Biventricular ICD (implantable cardioverter-defibrillator) in place  -No AICD shocks    CKD (chronic kidney disease) stage 4, GFR 15-29 ml/min  -Creatinine 3.0, likely cardiorenal  -Monitor with diuresis  -Consider nephrology consult    6/1  -Monitor closely with need for continued IV diuresis.     Pulmonary HTN  Continue IV diuresis    Unspecified essential hypertension  -Continue home medications        VTE Risk Mitigation (From admission, onward)         Ordered     enoxaparin injection 30 mg  Daily      05/28/20 0518                ANGELLA StreetC  Cardiology  [facility]

## 2020-06-01 NOTE — ASSESSMENT & PLAN NOTE
-Presented with decompensated combined CHF, in part due to dietary indiscretion  -Continue IV diuresis  -Add 5 mg of metolazone today and assess response  -Consider Lasix gtt if no improvement  -Continue Entresto, Coreg  -Repeat echo pending  -MPI stress test 6/19 negative for ischemia, + fixed defects    5/30   DIURESIS IN PROGRESS IMPROVING CLINICALLY.    5/31/202   SLOWLY IMPROVING  WITH CURRENT REGIMEN.    6/1  -Continue IV Bumex, BB, ASA, Metolazone, Entresto  -Dash diet, 2 gm sodium restriction  -1500 ml fluid restriction  -Daily weights  -Strict I/Os  -Reassess in AM

## 2020-06-01 NOTE — ASSESSMENT & PLAN NOTE
- continue basal  Insulin and meal time bolus per sliding scale   -HgA1c 6.0 ( 8 mos ago) suggestive of good out patient control     5/31  Stable  Avoid hypoglycemic episodes  Hypoglycemic protocol  Avoid nephrotoxic agents    6/1  Hypoglycemic  Long acting held overnight    Agree with decreasing long acting to once daily to avoid hypoglycemic episode outpt  F/u outpt to monitor, hba1c 6.3, most recently

## 2020-06-01 NOTE — ASSESSMENT & PLAN NOTE
- Initiate CHF pathway   -Bumex 3 mg IV BID   -Start BB  -Continue Entresto   -Strict intake /Output   - Salt restriction 2 gram /day   -Fluid restriction 1.5 L/day   5/29- Metolazone added per Cardiology.  Assess response   5/30:   continues to have dyspnea and volume overload  Now with diarrhea  5/31  Appears dyspneic but not worsening per patient  Improving with iv diuresing   Palliative consulted for goals of care  May need to revisit hospice rec  Once transitions to PO diuretic, discharge   Physical therapy/ot consult for possible placement  6/1  Appears less dyspneic; on RA  Transitioning to PO tomorrow  May discharge home with h/h

## 2020-06-01 NOTE — PLAN OF CARE
Discussed Plan of Care with patient and verbalized understanding - Patient remains AAOx4 - remains free of falls, accidents and trauma during the day shift. Bed is in the low position and the call light is within reach. PT/OT to start seeing patient. Will continue to monitor

## 2020-06-01 NOTE — PROGRESS NOTES
[facility] Hospital Medicine  Progress Note    Patient Name: Yan Choudhury  MRN: [id number]  Patient Class: IP- Inpatient   Admission Date: 5/28/2020  Length of Stay: 3 days  Attending Physician: Tommy Christopher MD  Primary Care Provider: Sandra Estevez MD        Subjective:     Principal Problem:Acute on chronic systolic heart failure        HPI:  Yan Choudhury is a 65 y.o. male patient with a PMHx of type 2 DM, HLD, HTN, obesity, CAD, ischemic cardiomyopathy, diverticulitis of colon, CKD Stage 3,  CHF,  PPM, CAD, and JAHAIRA on CPAP and PSHx of cardiac catheterization who presents to the Emergency Department for evaluation of SOB which onset gradually x2 weeks ago . Pt states he has gained 10 lbs in x1 week. Symptoms are worsening and moderate in severity. No mitigating or exacerbating factors reported. Associated sx includes BLE edema. Patient denies any fever, chills, numbness, weakness, CP, smoking, cough, palpitations, and all other sxs at this time. Pt states he is compliant with his fluid pill. No further complaints or concerns at this time.  Pt reports compliance with prescribed medications.  Pt reports increased sodium consumption (crawfish) and denies increased fluid intake.  Pt denies smoking and use of ETOH.  Pt is followed outpatient by Dr. Estevez (PCP) and Dr. Calvillo (Cardiology).  Pt is a full code and Elsie Choudhury (wife) is the surrogate decision maker.  ER work up showed: INR 1.6, BUN/Creatinine 34/3, Glucose 111, T. Bili 1.7, AST 50, BNP 3059, Troponin 0.061, , and pO2 53.  Chest xray showed no acute process.  EKG showed SR with fusion complexes, L axis deviation, and nonspecific intraventricular block with HR 98.  Hospital Medicine contacted for admission with patient placed in Observation Unit for further evaluation.     Overview/Hospital Course:  · 5/29- On O2 at 2L/min . SpO2 is satisfactory . Pt still c/o SOB and wheezing  and appears significant volume overloaded on physical exam .  Denies chest pain.  Cardiology added Metolazone . Continue IV Bumex . CHF pathway initiated. Labs resulted Na 137, K 3.9, BUN 30, Creatinine 3.0 ( stable) , BNP on admit 3059. Echo resulted Left ventricular systolic function with EF  15%.Grade III (severe) left ventricular diastolic dysfunction , global hypokinesia and pul HTN with estimated PA pressure 63, mod to severe TR.   · 5/30: c/o sob and leg swelling. Continue iv bumex and metolazone. Echo reviewed. Discussed palliative option. Patient also c/o diarrhea. No abx given during hospitalization. tx sx and obtain stool cx  · 5/31: denies CP, SOB. Reports leg swelling same. Diarrhea resolved. Palliative on board. Will ask to revisit hospice care  6/1: denies sx of CP, SOB. Leg swelling improved but chronic. Lives at home with wife. Cards on board, possibly transition to PO diuretic tomorrow    Interval History: continues on supp O2. Reports SOB and leg swelling and diarrhea. Consult palliative care. Continue diuresing     Review of Systems   Constitutional: Negative for appetite change and fever.   Respiratory: Positive for shortness of breath (improving).    Cardiovascular: Positive for leg swelling. Negative for chest pain.   Gastrointestinal: Negative for abdominal pain, diarrhea, nausea, rectal pain and vomiting.   Genitourinary: Negative for difficulty urinating.     Objective:     Vital Signs (Most Recent):  Temp: 96.2 °F (35.7 °C) (06/01/20 0751)  Pulse: 87 (06/01/20 0751)  Resp: 20 (06/01/20 0751)  BP: (!) 142/83 (06/01/20 0751)  SpO2: 97 % (06/01/20 0751) Vital Signs (24h Range):  Temp:  [96.2 °F (35.7 °C)-97.9 °F (36.6 °C)] 96.2 °F (35.7 °C)  Pulse:  [60-87] 87  Resp:  [17-22] 20  SpO2:  [95 %-100 %] 97 %  BP: ()/(65-89) 142/83     Weight: 103.5 kg (228 lb 2.8 oz)  Body mass index is 34.69 kg/m².    Intake/Output Summary (Last 24 hours) at 6/1/2020 1047  Last data filed at 6/1/2020 0500  Gross per 24 hour   Intake 948 ml   Output 1750 ml   Net -802  ml      Physical Exam   Constitutional: He appears well-developed and well-nourished. No distress.   HENT:   Head: Normocephalic and atraumatic.   Eyes: Right eye exhibits abnormal extraocular motion.   Right eye prosthesis   Neck: Normal range of motion.   Cardiovascular: Normal rate.   Pulmonary/Chest: He is in respiratory distress.   Less SOB, on RA   Abdominal: Soft. He exhibits distension.   Musculoskeletal: Normal range of motion. He exhibits edema (b/l LE 4+ pitting edema). He exhibits no tenderness.   Neurological: He is alert.   Skin: Skin is warm and dry.   Abdominal scarring with no s/s infection   Psychiatric: He has a normal mood and affect. His behavior is normal.   Vitals reviewed.      Significant Labs:   CBC:   No results for input(s): WBC, HGB, HCT, PLT in the last 48 hours.  CMP:   Recent Labs   Lab 05/31/20  0530 06/01/20  0503    137   K 3.8 4.2   CL 99 99   CO2 28 26   GLU 48* 86   BUN 39* 41*   CREATININE 2.9* 3.1*   CALCIUM 8.7 8.6*   ANIONGAP 9 12   EGFRNONAA 21* 20*       Significant Imaging: CXR: I have reviewed all pertinent results/findings within the past 24 hours and my personal findings are:  no acute findings      Assessment/Plan:      * Acute on chronic combined systolic and diastolic  heart failure with EF 15-20%  - Initiate CHF pathway   -Bumex 3 mg IV BID   -Start BB  -Continue Entresto   -Strict intake /Output   - Salt restriction 2 gram /day   -Fluid restriction 1.5 L/day   5/29- Metolazone added per Cardiology.  Assess response   5/30:   continues to have dyspnea and volume overload  Now with diarrhea  5/31  Appears dyspneic but not worsening per patient  Improving with iv diuresing   Palliative consulted for goals of care  May need to revisit hospice rec  Once transitions to PO diuretic, discharge   Physical therapy/ot consult for possible placement  6/1  Appears less dyspneic; on RA  Transitioning to PO tomorrow  May discharge home with h/h    Elevated LFTs  - Likely  hepatic congestion due to acute on chronic CHF( HFrEF)   -Expect to improve with diuresis     5/30  From hepatic congestion  Slightly elevated but trending down  Continue to monitor    5/31  Improving with diuresis       Type 2 diabetes mellitus, with long-term current use of insulin  - continue basal  Insulin and meal time bolus per sliding scale   -HgA1c 6.0 ( 8 mos ago) suggestive of good out patient control     5/31  Stable  Avoid hypoglycemic episodes  Hypoglycemic protocol  Avoid nephrotoxic agents    6/1  Hypoglycemic  Long acting held overnight    Agree with decreasing long acting to once daily to avoid hypoglycemic episode outpt  F/u outpt to monitor, hba1c 6.3, most recently      Biventricular ICD (implantable cardioverter-defibrillator) in place  - S/P CRT-ICD  - H/O ischemic Cardiomyopathy , EF 15 to 20%     5/30  Palliative consult  Cards on board  Started metolazone     5/31, 6/1  Keep POC      CKD (chronic kidney disease) stage 4, GFR 15-29 ml/min  - Monitor renal indices while on aggressive diuretic  regimen     5/30  Monitor  Consider neph consult  Palliative has been consulted for goals of care and possible hospice rec    5/31  Stable   Consider neph consult if worsens  Receiving IV diurectics  6/1  Stable   Monitor   Transitioning to PO lasix possibly tomorrow        Pulmonary HTN  - Continue Diuresis   - supplemental O2 to keep O2 sat 92% or higher   - Home O2 eval pending clinical course   -H/O Sleep Apnea     5/30  Continue diuresing   supp O2    5/31  Requires supp O2   Diuresing with iv diuretic     6/1  Likely transition to PO diuretic tomorrow and d/c home with h/h      Unspecified essential hypertension  - Resume Home med and adjust dose accordingly     5/30  Continue poc    5/31, 6/1  Improved  normotensive        VTE Risk Mitigation (From admission, onward)         Ordered     enoxaparin injection 30 mg  Daily      05/28/20 6140                      Tommy Christopher MD  Department of  [facility] Medicine   [facility]

## 2020-06-01 NOTE — PT/OT/SLP PROGRESS
Occupational Therapy      Patient Name:  Yan Choudhury   MRN:  [id number]   Eval initiated via chart review and attempted interview. Pt wanted to finish eating. Will complete eval at later time.    Ronna Whitaker OT  6/1/2020   12:00

## 2020-06-01 NOTE — SUBJECTIVE & OBJECTIVE
Interval History: continues on supp O2. Reports SOB and leg swelling and diarrhea. Consult palliative care. Continue diuresing     Review of Systems   Constitutional: Negative for appetite change and fever.   Respiratory: Positive for shortness of breath (improving).    Cardiovascular: Positive for leg swelling. Negative for chest pain.   Gastrointestinal: Negative for abdominal pain, diarrhea, nausea, rectal pain and vomiting.   Genitourinary: Negative for difficulty urinating.     Objective:     Vital Signs (Most Recent):  Temp: 96.2 °F (35.7 °C) (06/01/20 0751)  Pulse: 87 (06/01/20 0751)  Resp: 20 (06/01/20 0751)  BP: (!) 142/83 (06/01/20 0751)  SpO2: 97 % (06/01/20 0751) Vital Signs (24h Range):  Temp:  [96.2 °F (35.7 °C)-97.9 °F (36.6 °C)] 96.2 °F (35.7 °C)  Pulse:  [60-87] 87  Resp:  [17-22] 20  SpO2:  [95 %-100 %] 97 %  BP: ()/(65-89) 142/83     Weight: 103.5 kg (228 lb 2.8 oz)  Body mass index is 34.69 kg/m².    Intake/Output Summary (Last 24 hours) at 6/1/2020 1047  Last data filed at 6/1/2020 0500  Gross per 24 hour   Intake 948 ml   Output 1750 ml   Net -802 ml      Physical Exam   Constitutional: He appears well-developed and well-nourished. No distress.   HENT:   Head: Normocephalic and atraumatic.   Eyes: Right eye exhibits abnormal extraocular motion.   Right eye prosthesis   Neck: Normal range of motion.   Cardiovascular: Normal rate.   Pulmonary/Chest: He is in respiratory distress.   Less SOB, on RA   Abdominal: Soft. He exhibits distension.   Musculoskeletal: Normal range of motion. He exhibits edema (b/l LE 4+ pitting edema). He exhibits no tenderness.   Neurological: He is alert.   Skin: Skin is warm and dry.   Abdominal scarring with no s/s infection   Psychiatric: He has a normal mood and affect. His behavior is normal.   Vitals reviewed.      Significant Labs:   CBC:   No results for input(s): WBC, HGB, HCT, PLT in the last 48 hours.  CMP:   Recent Labs   Lab 05/31/20  0530  06/01/20  0503    137   K 3.8 4.2   CL 99 99   CO2 28 26   GLU 48* 86   BUN 39* 41*   CREATININE 2.9* 3.1*   CALCIUM 8.7 8.6*   ANIONGAP 9 12   EGFRNONAA 21* 20*       Significant Imaging: CXR: I have reviewed all pertinent results/findings within the past 24 hours and my personal findings are:  no acute findings

## 2020-06-01 NOTE — PLAN OF CARE
NEURO: AAO.      PAIN: No c/o this shift  CARDIAC: Paced on monitor #8624  RESP: Pt  O2 sats % on room air. 2 L/NC during sleep  MUSCULO: Continued weakness and unsteady on feet but has greatly improved. PT/OT to begin working with pt. Bed alarm set at all times d/t pt does not always call for assistance. AVASYS in use  GI/: Up with assist to bathroom  SKIN: Intact. C/O itching. PRN benadryl given with some relief.  SAFETY: Bed low/locked, SR up x2, CB in easy reach  POC: Ongoing  HOURLY ROUNDING COMPLETE  WILL CONTINUE TO MONITOR

## 2020-06-01 NOTE — PT/OT/SLP EVAL
Occupational Therapy   Evaluation    Name: Yan Choudhury  MRN: [id number]  Admitting Diagnosis:  Acute on chronic systolic heart failure      Recommendations:     Discharge Recommendations: home health OT, nursing facility, skilled  Discharge Equipment Recommendations:  walker, rolling  Barriers to discharge:  None    Assessment:     Yan Choudhury is a 65 y.o. male with a medical diagnosis of Acute on chronic systolic heart failure.  He presents with SELF CARE DEBILITY. Performance deficits affecting function: weakness, impaired endurance, impaired self care skills, visual deficits, impaired balance, gait instability, impaired functional mobilty, impaired cognition, decreased coordination, decreased safety awareness, edema.      Rehab Prognosis: Good; patient would benefit from acute skilled OT services to address these deficits and reach maximum level of function.       Plan:     Patient to be seen 3 x/week to address the above listed problems via self-care/home management, therapeutic activities, therapeutic exercises  · Plan of Care Expires: 06/08/20  · Plan of Care Reviewed with: patient    Subjective     Chief Complaint: NO C/O  Patient/Family Comments/goals: NONE STATED    Occupational Profile:  Living Environment: PATIENT STATED HE LIVES WITH WIFE IN 1-STORY HOME WITH NO STEPS.  Previous level of function: PATIENT STATED HE WAS (I) WITH SELF CARE TASKS.  Roles and Routines: PATIENT STATED HE WAS (I) WITH SELF CARE TASKS.    Equipment Used at Home:  none  Assistance upon Discharge: WIFE    Pain/Comfort:  · Pain Rating 1: 0/10    Patients cultural, spiritual, Islam conflicts given the current situation: no    Objective:     Communicated with: NURSE prior to session.  Patient found supine with peripheral IV, telemetry, bed alarm upon OT entry to room.    General Precautions: Standard, fall, respiratory, vision impaired   Orthopedic Precautions:N/A   Braces: N/A     Occupational Performance:    Bed Mobility:     · Patient completed Rolling/Turning to Left with  supervision  · Patient completed Rolling/Turning to Right with independence and supervision  · Patient completed Scooting/Bridging with supervision  · Patient completed Supine to Sit with independence and stand by assistance  · Patient completed Sit to Supine with supervision    Functional Mobility/Transfers:  · Patient completed Sit <> Stand Transfer with contact guard assistance  with  USE OF BED RAIL FOR STABILITY   · Patient completed Toilet Transfer Stand Pivot technique with contact guard assistance with  bedside commode  · Functional Mobility: CGA WITH STAND PIVOT T/F EOB <> BSC.    Activities of Daily Living:  · Upper Body Dressing: contact guard assistance      · Lower Body Dressing: stand by assistance        Cognitive/Visual Perceptual:  PATIENT DID NO KNOW MONTH NOR YEAR NOR LOCATION.    Physical Exam:  Balance:    -       PATIENT WITH FLEXED POSTURE IN STANDING  Upper Extremity Range of Motion:     -       Right Upper Extremity: WFL  -       Left Upper Extremity: WFL    AMPAC 6 Click ADL:  AMPAC Total Score: 22    Treatment & Education:  OT EVAL COMPLEGED TODAY.  PATIENT EDUCATED RE:  PURPOSE OF OT.  PATIENT PRACTICED ADL'S AND T/FS.  Education:    Patient left supine with all lines intact, call button in reach and bed alarm on    GOALS:   Multidisciplinary Problems     Occupational Therapy Goals        Problem: Occupational Therapy Goal    Goal Priority Disciplines Outcome Interventions   Occupational Therapy Goal     OT, PT/OT Ongoing, Progressing    Description:  GOALS TO BE MET BY 6/8/2020:    1)  PATIENT WILL STAND AT SINK X5-X10 MIN WITH (S).  2)  PATIENT WILL PERFORM TOILET T/F WITH MIN (A)  3)  PATIENT WILL PERFORM X10-X15 REPS OF BUE THERE EX TO INCREASE ENDURANCE.                    History:     Past Medical History:   Diagnosis Date    Arthritis     CAD (coronary artery disease)     Cataract     Chronic combined systolic and diastolic  congestive heart failure 3/24/2015    CKD (chronic kidney disease), stage III     Diabetes mellitus type II      AM    Diabetic retinopathy     anti Veg F injections for macular edema    Diverticulitis of colon     ED (erectile dysfunction)     Encounter for blood transfusion     Glaucoma     HTN (hypertension)     Hyperlipidemia     Ischemic cardiomyopathy     Obesity     JAHAIRA on CPAP     Pacemaker     Pulmonary HTN        Past Surgical History:   Procedure Laterality Date    CARDIAC CATHETERIZATION  2009    CHOLECYSTECTOMY      COLON SURGERY      Sigmoid resection    COLONOSCOPY N/A 10/11/2018    Procedure: COLONOSCOPY;  Surgeon: Quinn Aragon MD;  Location: [facility] ENDO;  Service: General;  Laterality: N/A;    EYE SURGERY      FLEXIBLE SIGMOIDOSCOPY N/A 1/18/2019    Procedure: SIGMOIDOSCOPY, FLEXIBLE;  Surgeon: Quinn Aragon MD;  Location: [facility] ENDO;  Service: General;  Laterality: N/A;    ILEOSTOMY N/A 10/30/2018    Procedure: CREATION, ILEOSTOMY;  Surgeon: Kevin Lindsay MD;  Location: [facility] OR;  Service: General;  Laterality: N/A;    Ileostomy reversal  01/2019    KNEE SURGERY      MOBILIZATION OF SPLENIC FLEXURE N/A 10/30/2018    Procedure: MOBILIZATION, SPLENIC FLEXURE;  Surgeon: Kevin Lindsay MD;  Location: [facility] OR;  Service: General;  Laterality: N/A;    REVISION COLOSTOMY N/A 10/30/2018    Procedure: REVISION OR CLOSURE, COLOSTOMY;  Surgeon: Kevin Lindsay MD;  Location: [facility] OR;  Service: General;  Laterality: N/A;  Parastomal Hernia Repair    RIGHT HEMICOLECTOMY Right 10/30/2018    Procedure: HEMICOLECTOMY, RIGHT;  Surgeon: Kevin Lindsay MD;  Location: [facility] OR;  Service: General;  Laterality: Right;       Time Tracking:     OT Date of Treatment: 06/01/20  OT Start Time: 1436  OT Stop Time: 1446  OT Total Time (min): 10 min    Billable Minutes:Evaluation 10    Rajani Rainey, OT  6/1/2020

## 2020-06-01 NOTE — PROGRESS NOTES
Advance Care Planning    Consult Note  Palliative Care      Consult Requested By: Tommy Christopher MD  Reason for Consult: Goals of care and ACP      SUBJECTIVE:     History of Present Illness:  Yan Choudhury is a 65 y.o. year old with a history of combined CHF, CAD/ICM, CRT-CD, HTN, DM 2, dyslipidemia, and obesity who presented to the emergency department complaining of shortness of breath and BLE. He was admitted due to CHF exacerbation. Palliative Care was consulted to assist with goals of care discussion and ACP completion. I met Mr. Choudhury in his room without family present. He reports he is feeling well and improved since admission. He was dyspneic with conversation but seems unaffected or concerned. He says that he will be going home tomorrow and understands the current plan for diuresis. In light of his cardiorenal syndrome and advanced Charlson Comorbidity Index (8) I asked his understanding if medical management were to reach the max benefit and what this means in regards to his advanced, irreversible heart failure. He was not able to describe a plan nor grasp the severity of his current disease. I broadly discussed the parameters when comfort care is initiated in patients with advanced, life limiting diseases and assured him his cardiologist and PCP would assist him with this decision when the time comes. We then discussed surrogacy appointment and he named his wife Elsie as primary with his two sons as alternates. He says he has already discussed code status with other medical providers and wishes to remain FULL code. He denies pain, nausea, chest pain, and SOB but endorses diarrhea.         Past Medical History:   Diagnosis Date    Arthritis     CAD (coronary artery disease)     Cataract     Chronic combined systolic and diastolic congestive heart failure 3/24/2015    CKD (chronic kidney disease), stage III     Diabetes mellitus type II      AM    Diabetic retinopathy     anti Veg F injections for  macular edema    Diverticulitis of colon     ED (erectile dysfunction)     Encounter for blood transfusion     Glaucoma     HTN (hypertension)     Hyperlipidemia     Ischemic cardiomyopathy     Obesity     JAHAIRA on CPAP     Pacemaker     Pulmonary HTN      Past Surgical History:   Procedure Laterality Date    CARDIAC CATHETERIZATION  2009    CHOLECYSTECTOMY      COLON SURGERY      Sigmoid resection    COLONOSCOPY N/A 10/11/2018    Procedure: COLONOSCOPY;  Surgeon: Quinn Aragon MD;  Location: [facility] ENDO;  Service: General;  Laterality: N/A;    EYE SURGERY      FLEXIBLE SIGMOIDOSCOPY N/A 1/18/2019    Procedure: SIGMOIDOSCOPY, FLEXIBLE;  Surgeon: Quinn Aragon MD;  Location: [facility] ENDO;  Service: General;  Laterality: N/A;    ILEOSTOMY N/A 10/30/2018    Procedure: CREATION, ILEOSTOMY;  Surgeon: Kevin Lindsay MD;  Location: [facility] OR;  Service: General;  Laterality: N/A;    Ileostomy reversal  01/2019    KNEE SURGERY      MOBILIZATION OF SPLENIC FLEXURE N/A 10/30/2018    Procedure: MOBILIZATION, SPLENIC FLEXURE;  Surgeon: Kevin Lindsay MD;  Location: [facility] OR;  Service: General;  Laterality: N/A;    REVISION COLOSTOMY N/A 10/30/2018    Procedure: REVISION OR CLOSURE, COLOSTOMY;  Surgeon: Kevin Lindsay MD;  Location: [facility] OR;  Service: General;  Laterality: N/A;  Parastomal Hernia Repair    RIGHT HEMICOLECTOMY Right 10/30/2018    Procedure: HEMICOLECTOMY, RIGHT;  Surgeon: Kevin Lindsay MD;  Location: [facility] OR;  Service: General;  Laterality: Right;     Family History   Problem Relation Age of Onset    Cancer Mother     Heart disease Father     Stroke Father     No Known Problems Sister     No Known Problems Brother     No Known Problems Maternal Aunt     No Known Problems Maternal Uncle     No Known Problems Paternal Aunt     No Known Problems Paternal Uncle     No Known Problems Maternal Grandmother     No Known Problems Maternal Grandfather     No Known Problems  Paternal Grandmother     No Known Problems Paternal Grandfather     Amblyopia Neg Hx     Blindness Neg Hx     Cataracts Neg Hx     Diabetes Neg Hx     Glaucoma Neg Hx     Hypertension Neg Hx     Macular degeneration Neg Hx     Retinal detachment Neg Hx     Strabismus Neg Hx     Thyroid disease Neg Hx      Social History     Socioeconomic History    Marital status:      Spouse name: Not on file    Number of children: 2    Years of education: Not on file    Highest education level: Not on file   Occupational History    Occupation: [employer]     Employer: [employer]   Social Needs    Financial resource strain: Not on file    Food insecurity:     Worry: Not on file     Inability: Not on file    Transportation needs:     Medical: Not on file     Non-medical: Not on file   Tobacco Use    Smoking status: Never Smoker    Smokeless tobacco: Never Used   Substance and Sexual Activity    Alcohol use: No     Alcohol/week: 0.0 standard drinks     Frequency: Never    Drug use: No    Sexual activity: Not Currently   Lifestyle    Physical activity:     Days per week: Not on file     Minutes per session: Not on file    Stress: Not on file   Relationships    Social connections:     Talks on phone: Not on file     Gets together: Not on file     Attends Yarsani service: Not on file     Active member of club or organization: Not on file     Attends meetings of clubs or organizations: Not on file     Relationship status: Not on file   Other Topics Concern    Not on file   Social History Narrative    . Lives with spouse. Has 2 children. 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      Review of patient's allergies indicates:  No Known Allergies    Medications:    Current Facility-Administered Medications:     albuterol-ipratropium 2.5 mg-0.5 mg/3 mL nebulizer solution 3 mL, 3 mL, Nebulization, Q4H PRN, Katie Lindsay NP, 3 mL at 05/29/20 0808     allopurinoL tablet 100 mg, 100 mg, Oral, Daily, Cleve Garcia MD, 100 mg at 06/01/20 0917    aspirin EC tablet 81 mg, 81 mg, Oral, QAM, Cleve Garcia MD, 81 mg at 06/01/20 0600    brimonidine 0.15 % OPTH DROP ophthalmic solution 1 drop, 1 drop, Both Eyes, BID, Cleve Garcia MD, 1 drop at 06/01/20 0916    budesonide nebulizer solution 0.5 mg, 0.5 mg, Nebulization, Q12H, Cleve Garcia MD, 0.5 mg at 06/01/20 0736    bumetanide injection 3 mg, 3 mg, Intravenous, BID, Cleve Garcia MD, 3 mg at 06/01/20 0916    carvediloL tablet 3.125 mg, 3.125 mg, Oral, BID WM, Cleve Garcia MD, 3.125 mg at 06/01/20 0917    dextrose 10% (D10W) Bolus, 12.5 g, Intravenous, PRN, Cleve Garcia MD    dextrose 10% (D10W) Bolus, 25 g, Intravenous, PRN, Cleve Garcia MD    diphenhydrAMINE capsule 25 mg, 25 mg, Oral, Q6H PRN, Katie Lindsay, AVERY, 25 mg at 05/31/20 2128    enoxaparin injection 30 mg, 30 mg, Subcutaneous, Daily, Cleve Garcia MD, 30 mg at 05/31/20 1654    glucagon (human recombinant) injection 1 mg, 1 mg, Intramuscular, PRN, Cleve Garcia MD    glucose chewable tablet 16 g, 16 g, Oral, PRN, Cleve Garcia MD    glucose chewable tablet 24 g, 24 g, Oral, PRN, Cleve Garcia MD    insulin aspart U-100 pen 0-5 Units, 0-5 Units, Subcutaneous, QID (AC + HS) PRN, Cleve Garcia MD    latanoprost 0.005 % ophthalmic solution 1 drop, 1 drop, Both Eyes, QHS, Cleve Garcia MD, 1 drop at 05/31/20 2128    levalbuterol nebulizer solution 0.63 mg, 0.63 mg, Nebulization, Q12H, Tommy Christopher MD, 0.63 mg at 06/01/20 0726    loperamide capsule 2 mg, 2 mg, Oral, QID PRN, Tommy Christopher MD, 2 mg at 05/30/20 2008    metOLazone tablet 5 mg, 5 mg, Oral, Daily, Jovana Woodard PA-C, 5 mg at 06/01/20 0917    ondansetron injection 4 mg, 4 mg, Intravenous, Q8H PRN, Cleve Garcia MD    pantoprazole EC tablet 40 mg, 40 mg, Oral, Daily, Cleve Garcia MD, 40 mg at 06/01/20 0916    sacubitriL-valsartan 24-26 mg  per tablet 1 tablet, 1 tablet, Oral, BID, Cleve Garcia MD, 1 tablet at 06/01/20 0917    simvastatin tablet 10 mg, 10 mg, Oral, QHS, Cleve Garcia MD, 10 mg at 05/31/20 2128    sodium chloride 0.9% flush 10 mL, 10 mL, Intravenous, PRN, Cleve Garcia MD    timolol maleate 0.5% ophthalmic solution 1 drop, 1 drop, Both Eyes, BID, Cleve Garcia MD, 1 drop at 06/01/20 0916        OBJECTIVE:   Symptom Assessment (ESAS 0-10 scale)     ESAS 0 1 2 3 4 5 6 7 8 9 10   Pain X             Dyspnea X             Anxiety              Nausea X             Depression               Anorexia X             Fatigue              Insomnia              Restlessness               Agitation              Constipation    no  Bowel Management Plan (BMP): no  Diarrhea        yes  Comments:     Charlson Comorbidity index- 8     ROS:  Review of Systems   Constitutional: Negative for appetite change, fatigue and fever.   HENT: Negative for sore throat and trouble swallowing.    Respiratory: Negative for cough and shortness of breath.    Cardiovascular: Positive for leg swelling. Negative for chest pain.   Gastrointestinal: Positive for diarrhea. Negative for abdominal pain and nausea.   Endocrine: Negative for polydipsia and polyphagia.   Genitourinary: Positive for frequency (f/t diuretics). Negative for difficulty urinating.   Musculoskeletal: Negative for arthralgias and back pain.   Skin: Negative for rash.   Neurological: Negative for weakness and numbness.   Psychiatric/Behavioral: Negative for sleep disturbance. The patient is not nervous/anxious.        Physical Exam:  Vitals: Temp: 97.7 °F (36.5 °C) (06/01/20 1120)  Pulse: 74 (06/01/20 1120)  Resp: 20 (06/01/20 0751)  BP: 103/72 (06/01/20 1120)  SpO2: 100 % (06/01/20 1120)    Gen: well-developed, well-nourished, conversational dyspnea but seems unaffected, mumbles ans stutters speech  Head: atraumatic, normocephalic  Eyes: Prosthetic OD with ptosis, conjuctiva and sclera clear  OS  Ears: hearing grossly intact with no external abnormality  Mouth: MM moist  Respiratory: CTAB  Heart: RRR  Abdomen: firm, NTTP, normoactive BS  Extremities: 3+ pitting edema BLE  Neurologic: normal coordination  Skin: no rashes or lesions  Psych: cooperative, normal mood and affect, normal attention span and concentration, normal memory, fund of knowledge appropriate     Labs:  CBC:   WBC   Date Value Ref Range Status   05/30/2020 5.34 3.90 - 12.70 K/uL Final     Hemoglobin   Date Value Ref Range Status   05/30/2020 12.8 (L) 14.0 - 18.0 g/dL Final     Hematocrit   Date Value Ref Range Status   05/30/2020 42.0 40.0 - 54.0 % Final     Mean Corpuscular Volume   Date Value Ref Range Status   05/30/2020 81 (L) 82 - 98 fL Final     Platelets   Date Value Ref Range Status   05/30/2020 170 150 - 350 K/uL Final       BMP  Lab Results   Component Value Date     06/01/2020    K 4.2 06/01/2020    CL 99 06/01/2020    CO2 26 06/01/2020    BUN 41 (H) 06/01/2020    CREATININE 3.1 (H) 06/01/2020    CALCIUM 8.6 (L) 06/01/2020    ANIONGAP 12 06/01/2020    ESTGFRAFRICA 23 (A) 06/01/2020    EGFRNONAA 20 (A) 06/01/2020       LFT:   Lab Results   Component Value Date    AST 44 (H) 05/30/2020    ALKPHOS 98 05/30/2020    BILITOT 1.1 (H) 05/30/2020       Albumin:   Albumin   Date Value Ref Range Status   05/30/2020 3.1 (L) 3.5 - 5.2 g/dL Final       Radiology:I have reviewed all pertinent imaging results/findings within the past 24 hours.  - CXR 5/28/20: No acute radiographic abnormality in the chest.    - ECHO 5/29/20:  · Mild left ventricular enlargement.  · Left ventricular systolic function. The estimated ejection fraction is 15%.  · Grade III (severe) left ventricular diastolic dysfunction consistent with restrictive physiology.  · Severe global hypokinetic wall motion.  · Moderate right ventricular enlargement.  · Moderately reduced right ventricular systolic function.  · Moderate to severe tricuspid regurgitation.  · Mild to  moderate pulmonic regurgitation.  · Mild aortic regurgitation.  · Elevated central venous pressure (15 mmHg).  · The estimated PA systolic pressure is 63 mmHg.  · Pulmonary hypertension present.      ASSESSMENT   Yan Choudhury is a 65 y.o. year old with a history of combined CHF, CAD/ICM, HTN, DM 2, CRT-CD, dyslipidemia, and obesity who presented to the emergency department complaining of shortness of breath and BLE. He was admitted due to CHF exacerbation. Palliative Care was consulted to assist with goals of care discussion and ACP completion.    PLAN   1. Encounter for Palliative Care  - Code status: FULL  - Surrogate: wife Elsie Choudhury, alternate: 2 sons jointly  - Primary goal is life prolonging at this time.  - Broadly introduced the topic of comfort care if he were to reach the maximum benefit of aggressive therapy in light of CCI score.  - Palliative Care would be happy to advance GOC discussion when deemed appropriate by PCP or cardiology.    2. Combined CHF with CRT-D  - Currently receiving IV diuresis  - Plans to rotate to PO diuretic tomorrow   - Cardiology notes reviewed    3. CKD stage IV  - Cr in baseline range  - Primary team monitoring labs as diuresis could negatively impact renal function       Discussed case and visit details with Dr. Christopher, Donita Shea NP, and Dr. Calvillo.     Thank you for allowing Palliative care to be involved in the care of Yan Choudhury.         Medical decision making: HIGH based on high risk of death, untreated symptoms, high risk medications, poor prognosis, management of more than one chronic illness in exacerbation        Monika Bruce PA-C  Palliative Care

## 2020-06-02 VITALS
SYSTOLIC BLOOD PRESSURE: 108 MMHG | WEIGHT: 226.88 LBS | HEIGHT: 68 IN | RESPIRATION RATE: 18 BRPM | TEMPERATURE: 98 F | OXYGEN SATURATION: 99 % | BODY MASS INDEX: 34.38 KG/M2 | DIASTOLIC BLOOD PRESSURE: 66 MMHG | HEART RATE: 68 BPM

## 2020-06-02 DIAGNOSIS — I50.42 CHRONIC COMBINED SYSTOLIC AND DIASTOLIC CONGESTIVE HEART FAILURE: Primary | ICD-10-CM

## 2020-06-02 PROBLEM — Z51.5 ENCOUNTER FOR PALLIATIVE CARE: Status: RESOLVED | Noted: 2020-06-01 | Resolved: 2020-06-02

## 2020-06-02 LAB
ANION GAP SERPL CALC-SCNC: 10 MMOL/L (ref 8–16)
BUN SERPL-MCNC: 44 MG/DL (ref 8–23)
CALCIUM SERPL-MCNC: 8.5 MG/DL (ref 8.7–10.5)
CHLORIDE SERPL-SCNC: 99 MMOL/L (ref 95–110)
CO2 SERPL-SCNC: 28 MMOL/L (ref 23–29)
CREAT SERPL-MCNC: 2.8 MG/DL (ref 0.5–1.4)
EST. GFR  (AFRICAN AMERICAN): 26 ML/MIN/1.73 M^2
EST. GFR  (NON AFRICAN AMERICAN): 22 ML/MIN/1.73 M^2
GLUCOSE SERPL-MCNC: 107 MG/DL (ref 70–110)
POCT GLUCOSE: 102 MG/DL (ref 70–110)
POCT GLUCOSE: 143 MG/DL (ref 70–110)
POTASSIUM SERPL-SCNC: 3.8 MMOL/L (ref 3.5–5.1)
SODIUM SERPL-SCNC: 137 MMOL/L (ref 136–145)

## 2020-06-02 PROCEDURE — 97535 SELF CARE MNGMENT TRAINING: CPT

## 2020-06-02 PROCEDURE — S0171 BUMETANIDE 0.5 MG: HCPCS | Performed by: INTERNAL MEDICINE

## 2020-06-02 PROCEDURE — 25000003 PHARM REV CODE 250: Performed by: PHYSICIAN ASSISTANT

## 2020-06-02 PROCEDURE — 97116 GAIT TRAINING THERAPY: CPT

## 2020-06-02 PROCEDURE — 99900035 HC TECH TIME PER 15 MIN (STAT)

## 2020-06-02 PROCEDURE — 25000003 PHARM REV CODE 250: Performed by: NURSE PRACTITIONER

## 2020-06-02 PROCEDURE — 80048 BASIC METABOLIC PNL TOTAL CA: CPT

## 2020-06-02 PROCEDURE — 25000003 PHARM REV CODE 250: Performed by: INTERNAL MEDICINE

## 2020-06-02 PROCEDURE — 94640 AIRWAY INHALATION TREATMENT: CPT

## 2020-06-02 PROCEDURE — 97530 THERAPEUTIC ACTIVITIES: CPT

## 2020-06-02 PROCEDURE — 99232 SBSQ HOSP IP/OBS MODERATE 35: CPT | Mod: ,,, | Performed by: INTERNAL MEDICINE

## 2020-06-02 PROCEDURE — 25000242 PHARM REV CODE 250 ALT 637 W/ HCPCS: Performed by: INTERNAL MEDICINE

## 2020-06-02 PROCEDURE — 99232 PR SUBSEQUENT HOSPITAL CARE,LEVL II: ICD-10-PCS | Mod: ,,, | Performed by: INTERNAL MEDICINE

## 2020-06-02 PROCEDURE — 94761 N-INVAS EAR/PLS OXIMETRY MLT: CPT

## 2020-06-02 PROCEDURE — 27000221 HC OXYGEN, UP TO 24 HOURS

## 2020-06-02 PROCEDURE — 36415 COLL VENOUS BLD VENIPUNCTURE: CPT

## 2020-06-02 PROCEDURE — 25000242 PHARM REV CODE 250 ALT 637 W/ HCPCS: Performed by: FAMILY MEDICINE

## 2020-06-02 RX ORDER — CARVEDILOL 3.12 MG/1
3.12 TABLET ORAL 2 TIMES DAILY WITH MEALS
Qty: 60 TABLET | Refills: 0 | Status: SHIPPED | OUTPATIENT
Start: 2020-06-02 | End: 2020-08-14 | Stop reason: SDUPTHER

## 2020-06-02 RX ORDER — METOLAZONE 5 MG/1
5 TABLET ORAL DAILY
Qty: 30 TABLET | Refills: 0 | Status: ON HOLD | OUTPATIENT
Start: 2020-06-03 | End: 2020-06-26 | Stop reason: HOSPADM

## 2020-06-02 RX ORDER — INSULIN GLARGINE 100 [IU]/ML
INJECTION, SOLUTION SUBCUTANEOUS
Qty: 30 ML | Refills: 11 | Status: SHIPPED | OUTPATIENT
Start: 2020-06-02

## 2020-06-02 RX ADMIN — METOLAZONE 5 MG: 5 TABLET ORAL at 09:06

## 2020-06-02 RX ADMIN — TIMOLOL MALEATE 1 DROP: 5 SOLUTION OPHTHALMIC at 09:06

## 2020-06-02 RX ADMIN — BUDESONIDE 0.5 MG: 0.5 INHALANT RESPIRATORY (INHALATION) at 08:06

## 2020-06-02 RX ADMIN — ALLOPURINOL 100 MG: 100 TABLET ORAL at 09:06

## 2020-06-02 RX ADMIN — ASPIRIN 81 MG: 81 TABLET, COATED ORAL at 06:06

## 2020-06-02 RX ADMIN — CARVEDILOL 3.12 MG: 3.12 TABLET, FILM COATED ORAL at 06:06

## 2020-06-02 RX ADMIN — LEVALBUTEROL HYDROCHLORIDE 0.63 MG: 0.63 SOLUTION RESPIRATORY (INHALATION) at 08:06

## 2020-06-02 RX ADMIN — BRIMONIDINE TARTRATE 1 DROP: 1.5 SOLUTION OPHTHALMIC at 09:06

## 2020-06-02 RX ADMIN — BUMETANIDE 3 MG: 0.25 INJECTION INTRAMUSCULAR; INTRAVENOUS at 09:06

## 2020-06-02 RX ADMIN — SACUBITRIL AND VALSARTAN 1 TABLET: 24; 26 TABLET, FILM COATED ORAL at 09:06

## 2020-06-02 RX ADMIN — DIPHENHYDRAMINE HYDROCHLORIDE 25 MG: 25 CAPSULE ORAL at 12:06

## 2020-06-02 RX ADMIN — PANTOPRAZOLE SODIUM 40 MG: 40 TABLET, DELAYED RELEASE ORAL at 09:06

## 2020-06-02 NOTE — ASSESSMENT & PLAN NOTE
- continue basal  Insulin and meal time bolus per sliding scale   -HgA1c 6.0 ( 8 mos ago) suggestive of good out patient control     5/31  Stable  Avoid hypoglycemic episodes  Hypoglycemic protocol  Avoid nephrotoxic agents    6/1  Hypoglycemic  Long acting held overnight    Agree with decreasing long acting to once daily to avoid hypoglycemic episode outpt  F/u outpt to monitor, hba1c 6.3, most recently    6/2   Decreased insulin dose to 20u once daily  F/u outpt to monitor

## 2020-06-02 NOTE — PT/OT/SLP PROGRESS
Occupational Therapy  Treatment    Yan Choudhury   MRN: [id number]   Admitting Diagnosis: Acute on chronic systolic heart failure    OT Date of Treatment: 06/02/20   OT Start Time: 0932  OT Stop Time: 0947  OT Total Time (min): 15 min    Billable Minutes:  Self Care/Home Management 15    General Precautions: Standard, fall, vision impaired, respiratory  Orthopedic Precautions: N/A  Braces: N/A         Subjective:  Communicated with NURSE  prior to session.       Pain/Comfort  Pain Rating 1: 0/10    Objective:  Patient found with: telemetry, peripheral IV     Functional Mobility:  Bed Mobility:  (S)       Transfers:  MIN (A) WITH TOILET T/F DUE TO LOW SEAT.  PATIENT EDUCATED THAT BSC OVER TOILET WILL INCREASE HEIGHT OF SITTING SURFACE WHICH WILL DECREASE NEED FOR (A) WITH TOILET T/F.        Functional Ambulation: SBA WITH RW BEDROOM <> BATHROOM    Activities of Daily Living:     Feeding adaptive equipment: NT     UE adaptive equipment: NONE REQUIRED     LE adaptive equipment: NONE REQUIRED                    Bathing adaptive equipment: NT    Balance:   Static Sit: FAIR+: Able to take MINIMAL challenges from all directions  Dynamic Sit: FAIR+: Maintains balance through MINIMAL excursions of active trunk motion  Static Stand: FAIR: Maintains without assist but unable to take challenges  Dynamic stand: FAIR: Needs CONTACT GUARD during gait    Therapeutic Activities and Exercises:  PATIENT DONNED HOSPITAL GOWN AS ROBE OVER SWEATSHIRT REQUIRING MIN (A) DUE TO GOWN GETTING CAUGHT BEHIND BACK.  PATIENT PERFORMED TOILET T/F WITH MIN (A) WITH USE OF GRAB BAR DUE TO LOW TOILET.    AM-PAC 6 CLICK ADL   How much help from another person does this patient currently need?   1 = Unable, Total/Dependent Assistance  2 = A lot, Maximum/Moderate Assistance  3 = A little, Minimum/Contact Guard/Supervision  4 = None, Modified [place]/Independent    Putting on and taking off regular lower body clothing? : 4  Bathing (including washing,  "rinsing, drying)?: 3  Toileting, which includes using toilet, bedpan, or urinal? : 3  Putting on and taking off regular upper body clothing?: 4  Taking care of personal grooming such as brushing teeth?: 4  Eating meals?: 4  Daily Activity Total Score: 22     AM-PAC Raw Score CMS "G-Code Modifier Level of Impairment Assistance   6 % Total / Unable   7 - 8 CM 80 - 100% Maximal Assist   9-13 CL 60 - 80% Moderate Assist   14 - 19 CK 40 - 60% Moderate Assist   20 - 22 CJ 20 - 40% Minimal Assist   23 CI 1-20% SBA / CGA   24 CH 0% Independent/ Mod I       Patient left up in chair with all lines intact, call button in reach and chair alarm on    ASSESSMENT:  Yan Choudhury is a 65 y.o. male with a medical diagnosis of Acute on chronic systolic heart failure and presents with SELF CARE DEBILITY.    Rehab identified problem list/impairments: Rehab identified problem list/impairments: weakness, impaired endurance, impaired self care skills, visual deficits, impaired balance, gait instability, impaired functional mobilty, decreased safety awareness, decreased coordination    Rehab potential is good.    Activity tolerance: Good    Discharge recommendations: Discharge Facility/Level of Care Needs: home health OT, nursing facility, skilled     Barriers to discharge: Barriers to Discharge: None    Equipment recommendations: walker, rolling, bedside commode     GOALS:   Multidisciplinary Problems     Occupational Therapy Goals        Problem: Occupational Therapy Goal    Goal Priority Disciplines Outcome Interventions   Occupational Therapy Goal     OT, PT/OT Ongoing, Progressing    Description:  GOALS TO BE MET BY 6/8/2020:    1)  PATIENT WILL STAND AT SINK X5-X10 MIN WITH (S).  2)  PATIENT WILL PERFORM TOILET T/F WITH MIN (A)  3)  PATIENT WILL PERFORM X10-X15 REPS OF BUE THERE EX TO INCREASE ENDURANCE.                    Plan:  Patient to be seen 3 x/week to address the above listed problems via self-care/home management, " therapeutic activities, therapeutic exercises  Plan of Care expires: 06/08/20  Plan of Care reviewed with: patient         Rajani Rainey, OT  06/02/2020

## 2020-06-02 NOTE — PROGRESS NOTES
Home Oxygen Evaluation    Date Performed: 2020    1) Patient's Home O2 Sat on room air, while at rest: 97% RA        If O2 sats on room air at rest are 88% or below, patient qualifies. No additional testing needed. Document N/A in steps 2 and 3. If 89% or above, complete steps 2.      2) Patient's O2 Sat on room air while exercisin% RA        If O2 sats on room air while exercising remain 89% or above patient does not qualify, no further testing needed Document N/A in step 3. If O2 sats on room air while exercising are 88% or below, continue to step 3.      3) Patient's O2 Sat while exercising on O2: N/A         (Must show improvement from #2 for patients to qualify)    If O2 sats improve on oxygen, patient qualifies for portable oxygen. If not, the patient does not qualify.

## 2020-06-02 NOTE — PLAN OF CARE
PATIENT WOULD BENEFIT FROM CONT SKILLED OT INTERVENTION TO ADDRESS UNMET GOALS AND INCREASE SAFETY AND (I) WITH SELF CARE TASKS.

## 2020-06-02 NOTE — PLAN OF CARE
Discussed Plan of Care with patient and verbalized understanding - Patient remains AAOx1 - remains free of falls, accidents and trauma during the day shift. Bed is in the low position, bed alarm was on at all times as well as using [employer] to monitor patient and the call light is within reach. Assisted him several times to the bathroom during the shift and let him sit in the chair with the chair alarm on. Will continue to monitor

## 2020-06-02 NOTE — ASSESSMENT & PLAN NOTE
- Initiate CHF pathway   -Bumex 3 mg IV BID   -Start BB  -Continue Entresto   -Strict intake /Output   - Salt restriction 2 gram /day   -Fluid restriction 1.5 L/day   5/29- Metolazone added per Cardiology.  Assess response   5/30:   continues to have dyspnea and volume overload  Now with diarrhea  5/31  Appears dyspneic but not worsening per patient  Improving with iv diuresing   Palliative consulted for goals of care  May need to revisit hospice rec  Once transitions to PO diuretic, discharge   Physical therapy/ot consult for possible placement  6/1  Appears less dyspneic; on RA  Transitioning to PO tomorrow  May discharge home with h/h    6/2  Noticeable improvement  Does not qualify for home o2  Resume PO diuretic  Start BB and metolazone   F/u cards outpt 1-2 weeks

## 2020-06-02 NOTE — PROGRESS NOTES
[facility]  Cardiology  Progress Note    Patient Name: Yan Choudhury  MRN: [id number]  Admission Date: 5/28/2020  Hospital Length of Stay: 4 days  Code Status: Full Code   Attending Physician: Tommy Christopher MD   Primary Care Physician: Sandra Estevez MD  Expected Discharge Date: 6/2/2020  Principal Problem:Acute on chronic systolic heart failure    Subjective:   HPI    Mr. Choudhury is a 65 year old male patient whose current medical conditions include combined CHF, CAD/ICM, LBBB, HTN, CRI, DM, CRT-CD, dyslipidemia, and obesity who presented to [facility] ED yesterday with a chief complaint of worsening SOB over the past two weeks. Associated symptoms included orthopnea, BLE edema, abdominal bloating, and a weight gain of 10 lbs. Patient denied any associated davion chest pain, palpitations, PND, near syncope, syncope, fever, or chills. Initial workup in ED revealed BNP > 3,000, creatinine of 3.0, and troponin of 0.061 and patient was subsequently admitted for further evaluation and treatment. Cardiology consulted to assist with management. Patient seen and examined today, sitting up in bed. +Conversational dyspnea noted. Admits to SOB and BLE edema. Remains chest pain free. He admits to dietary indiscretion, recently ate crawfish. He reports compliance with his medications. Chart reviewed. Troponin chronically elevated. Stress 6/19 negative for ischemia, + fixed defect. Repeat echo pending.      Hospital Course:   5/30/202 DIURESING FEELS BETTER LESS LEG SWELLING NO SHORTNESS OF BREATH.    5/31/2020   LEG SWELLING IMPROVED LESS SHORT OF BREATH.    6/1/20--Patient seen and examined in room, lying in bed. Feels ok today. Reports improvement in shortness of breath and leg swelling. Needs continued IV diuresis for another today. Labs reviewed, K+4.2, Cr 3.1.     6/2/20-Patient seen and examined in room, lying in bed. Ready for discharge home today. Continues to diurese well each day. Ok to discharge home today with  close cardiology follow up.     Interval History: no acute issues noted o/n. Ok to discharge home with close cardiology follow up.     Review of Systems   Constitution: Positive for malaise/fatigue.   HENT: Negative for hearing loss and hoarse voice.    Eyes: Negative for blurred vision and visual disturbance.   Cardiovascular: Positive for leg swelling. Negative for chest pain, claudication, dyspnea on exertion, irregular heartbeat, near-syncope, orthopnea, palpitations, paroxysmal nocturnal dyspnea and syncope.   Respiratory: Negative for cough, hemoptysis, shortness of breath, sleep disturbances due to breathing, snoring and wheezing.    Endocrine: Negative for cold intolerance and heat intolerance.   Hematologic/Lymphatic: Does not bruise/bleed easily.   Skin: Negative for color change, dry skin and nail changes.   Musculoskeletal: Positive for arthritis and back pain. Negative for joint pain and myalgias.   Gastrointestinal: Negative for bloating, abdominal pain, constipation, nausea and vomiting.   Genitourinary: Negative for dysuria, flank pain, hematuria and hesitancy.   Neurological: Positive for weakness. Negative for headaches, light-headedness, loss of balance, numbness and paresthesias.   Psychiatric/Behavioral: Negative for altered mental status.   Allergic/Immunologic: Negative for environmental allergies.     Objective:     Vital Signs (Most Recent):  Temp: 97.9 °F (36.6 °C) (06/02/20 1107)  Pulse: 71 (06/02/20 1107)  Resp: 18 (06/02/20 0824)  BP: 108/66 (06/02/20 1107)  SpO2: 99 % (06/02/20 1107) Vital Signs (24h Range):  Temp:  [97.6 °F (36.4 °C)-98.8 °F (37.1 °C)] 97.9 °F (36.6 °C)  Pulse:  [63-82] 71  Resp:  [18-22] 18  SpO2:  [91 %-100 %] 99 %  BP: (108-139)/(66-91) 108/66     Weight: 102.9 kg (226 lb 13.7 oz)  Body mass index is 34.49 kg/m².     SpO2: 99 %  O2 Device (Oxygen Therapy): nasal cannula      Intake/Output Summary (Last 24 hours) at 6/2/2020 1145  Last data filed at 6/2/2020  0359  Gross per 24 hour   Intake 200 ml   Output 1340 ml   Net -1140 ml       Lines/Drains/Airways     Peripheral Intravenous Line                 Peripheral IV - Single Lumen 05/28/20 1601 22 G Right Wrist 4 days                Physical Exam   Constitutional: He is oriented to person, place, and time. He appears well-developed and well-nourished. No distress.   HENT:   Head: Normocephalic and atraumatic.   Eyes: Pupils are equal, round, and reactive to light.   Neck: Normal range of motion and full passive range of motion without pain. Neck supple. No JVD present.   Cardiovascular: Normal rate, regular rhythm, S1 normal, S2 normal and intact distal pulses. PMI is not displaced. Exam reveals no distant heart sounds.   No murmur heard.  Pulses:       Radial pulses are 2+ on the right side, and 2+ on the left side.        Dorsalis pedis pulses are 2+ on the right side, and 2+ on the left side.   Pulmonary/Chest: Effort normal. No accessory muscle usage. No respiratory distress. He has decreased breath sounds in the right lower field and the left lower field. He has no wheezes. He has no rales.   Abdominal: Soft. Bowel sounds are normal. He exhibits no distension. There is no tenderness.   Musculoskeletal: Normal range of motion. He exhibits edema.        Right ankle: He exhibits swelling.        Left ankle: He exhibits swelling.   Neurological: He is alert and oriented to person, place, and time.   Skin: Skin is warm and dry. He is not diaphoretic. No cyanosis. Nails show no clubbing.   Psychiatric: He has a normal mood and affect. His speech is normal and behavior is normal. Judgment and thought content normal. Cognition and memory are normal.   Nursing note and vitals reviewed.      Significant Labs:   BMP:   Recent Labs   Lab 06/01/20  0503 06/02/20  0428   GLU 86 107    137   K 4.2 3.8   CL 99 99   CO2 26 28   BUN 41* 44*   CREATININE 3.1* 2.8*   CALCIUM 8.6* 8.5*   , CBC No results for input(s): WBC, HGB,  HCT, PLT in the last 48 hours. and All pertinent lab results from the last 24 hours have been reviewed.    Significant Imaging: Echocardiogram:   Transthoracic echo (TTE) complete (Cupid Only):   Results for orders placed or performed during the hospital encounter of 05/28/20   Echo Color Flow Doppler? Yes   Result Value Ref Range    Ascending aorta 3.50 cm    STJ 3.56 cm    AV mean gradient 2 mmHg    Ao peak luis f 0.88 m/s    Ao VTI 16.61 cm    IVRT 78.43 msec    IVS 1.11 (A) 0.6 - 1.1 cm    LA size 4.16 cm    Left Atrium Major Axis 5.28 cm    Left Atrium Minor Axis 5.18 cm    LVIDD 5.55 3.5 - 6.0 cm    LVIDS 5.30 (A) 2.1 - 4.0 cm    LVOT diameter 2.01 cm    LVOT peak VTI 16.95 cm    PW 1.02 0.6 - 1.1 cm    MV Peak A Luis F 0.30 m/s    E wave decelartion time 141.72 msec    MV Peak E Luis F 0.79 m/s    RA Major Axis 4.80 cm    RVDD 3.62 cm    Sinus 3.66 cm    TAPSE 1.21 cm    TR Max Luis F 3.46 m/s    Ao root annulus 3.63 cm    LV Diastolic Volume 150.25 mL    LV Systolic Volume 135.34 mL    LVOT peak luis f 0.69 m/s    LA WIDTH 3.58 cm    RA Width 4.13 cm    FS 5 %    LA volume 66.20 cm3    LV mass 235.25 g    Left Ventricle Relative Wall Thickness 0.37 cm    AV valve area 3.24 cm2    AV Velocity Ratio 0.78     AV index (prosthetic) 1.02     E/A ratio 2.63     LVOT area 3.2 cm2    LVOT stroke volume 53.76 cm3    AV peak gradient 3 mmHg    LV Systolic Volume Index 62.2 mL/m2    LV Diastolic Volume Index 69.02 mL/m2    LA Volume Index 30.4 mL/m2    LV Mass Index 108 g/m2    Triscuspid Valve Regurgitation Peak Gradient 48 mmHg    BSA 2.25 m2    Right Atrial Pressure (from IVC) 15 mmHg    TV rest pulmonary artery pressure 63 mmHg    Narrative    · Mild left ventricular enlargement.  · Left ventricular systolic function. The estimated ejection fraction is   15%.  · Grade III (severe) left ventricular diastolic dysfunction consistent   with restrictive physiology.  · Severe global hypokinetic wall motion.  · Moderate right  ventricular enlargement.  · Moderately reduced right ventricular systolic function.  · Moderate to severe tricuspid regurgitation.  · Mild to moderate pulmonic regurgitation.  · Mild aortic regurgitation.  · Elevated central venous pressure (15 mmHg).  · The estimated PA systolic pressure is 63 mmHg.  · Pulmonary hypertension present.       and X-Ray: CXR: X-Ray Chest 1 View (CXR): No results found for this visit on 05/28/20. and X-Ray Chest PA and Lateral (CXR): No results found for this visit on 05/28/20.    Assessment and Plan:       * Acute on chronic combined systolic and diastolic  heart failure with EF 15-20%  -Presented with decompensated combined CHF, in part due to dietary indiscretion  -Continue IV diuresis  -Add 5 mg of metolazone today and assess response  -Consider Lasix gtt if no improvement  -Continue Entresto, Coreg  -Repeat echo pending  -MPI stress test 6/19 negative for ischemia, + fixed defects    5/30   DIURESIS IN PROGRESS IMPROVING CLINICALLY.    5/31/202   SLOWLY IMPROVING  WITH CURRENT REGIMEN.    6/1  -Continue IV Bumex, BB, ASA, Metolazone, Entresto  -Dash diet, 2 gm sodium restriction  -1500 ml fluid restriction  -Daily weights  -Strict I/Os  -Reassess in AM    6/2  -OK to discharge home with close cardiology follow up  -Continue PO Bumex 2 mg BID, ASA, BB, Metolazone, Entresto  -Cardiac diet  -1500 ml fluid restriction  -Clinic will call to arrange follow up in next 1-2 weeks    Elevated LFTs  -Secondary to hepatic congestion from CHF  -Monitor with diuresis    Type 2 diabetes mellitus, with long-term current use of insulin  -Mgmt as per primary team    Biventricular ICD (implantable cardioverter-defibrillator) in place  -No AICD shocks    CKD (chronic kidney disease) stage 4, GFR 15-29 ml/min  -Creatinine 3.0, likely cardiorenal  -Monitor with diuresis  -Consider nephrology consult    6/1  -Monitor closely with need for continued IV diuresis.     Pulmonary HTN  Continue IV  diuresis    Unspecified essential hypertension  -Continue home medications        VTE Risk Mitigation (From admission, onward)         Ordered     enoxaparin injection 30 mg  Daily      05/28/20 7550                [address]PRE[address]-C  Cardiology  [facility]

## 2020-06-02 NOTE — PLAN OF CARE
Aox self. POC reviewed. Updated white board. Able to verbalize understanding, needs & follow commands. VSS. Tele-monitoring continues. 20g PIV to R-wrist. Saline locked. Skin free of breakdown. Denies pain. Blood glucose monitoring AC/HS. NADN at this time. Resting quietly in bed; restless throughout night. Hourly rounding complete. All safety measures remain in place. Bed alarm on & audible. Free of falls. SR up x2; bed low & locked. Call light w/in reach. Will continue to monitor throughout shift.

## 2020-06-02 NOTE — PT/OT/SLP PROGRESS
Physical Therapy  Treatment    Yan Choudhury   MRN: [id number]   Admitting Diagnosis: Acute on chronic systolic heart failure    PT Received On: 06/02/20  PT Start Time: 0920     PT Stop Time: 0945    PT Total Time (min): 25 min       Billable Minutes:  Gait Training 15 and Therapeutic Activity 10  PT/PTA: PT       General Precautions: Standard, fall, respiratory, vision impaired  Orthopedic Precautions: N/A   Braces: N/A    Subjective:  Communicated with NURSE WEBER prior to session.  Pain/Comfort  Pain Rating 1: 0/10    Objective:   Patient found with: telemetry, peripheral IV    Functional Mobility:  Therapeutic Activities and Exercises:  PT FOUND SUPINE IN BED UPON ARRIVAL, AGREEABLE TO TX., SUP>SIT WITH SBA, SEATED SCOOT TO EOB WITH SBA, PT DONNED ROBE WITH NATI, SIT>STAND WITH CGA, PT ' WITH RW AND CGA, QUICK PACE, CUES FOR UPRIGHT POSTURE, INCREASED SOB ON ROOM AIR, PT RETURN TO ROOM, TOILET TF WITH NATI, RETURN TO CHAIR WITH CGA, REVIEW BLE THEREX TO PERFORM WHILE SEATED IN CHAIR    AM-PAC 6 CLICK MOBILITY  How much help from another person does this patient currently need?   1 = Unable, Total/Dependent Assistance  2 = A lot, Maximum/Moderate Assistance  3 = A little, Minimum/Contact Guard/Supervision  4 = None, Modified [place]/Independent    Turning over in bed (including adjusting bedclothes, sheets and blankets)?: 4  Sitting down on and standing up from a chair with arms (e.g., wheelchair, bedside commode, etc.): 3  Moving from lying on back to sitting on the side of the bed?: 4  Moving to and from a bed to a chair (including a wheelchair)?: 3  Need to walk in hospital room?: 3  Climbing 3-5 steps with a railing?: 1  Basic Mobility Total Score: 18    AM-PAC Raw Score CMS G-Code Modifier Level of Impairment Assistance   6 % Total / Unable   7 - 9 CM 80 - 100% Maximal Assist   10 - 14 CL 60 - 80% Moderate Assist   15 - 19 CK 40 - 60% Moderate Assist   20 - 22 CJ 20 - 40% Minimal Assist   23  CI 1-20% SBA / CGA   24 CH 0% Independent/ Mod I     Patient left up in chair with all lines intact, call button in reach, chair alarm on and NURSE notified.    Assessment:  Yan Choudhury is a 65 y.o. male with a medical diagnosis of Acute on chronic systolic heart failure and presents with IMPAIRED FUNCTIONAL MOBILITY. PT WILL BENEFIT FROM CONT. SKILLED P.T. TO ADDRESS IMPAIRMENTS    Rehab identified problem list/impairments: Rehab identified problem list/impairments: weakness, impaired endurance, impaired functional mobilty, gait instability, impaired balance, decreased coordination, decreased safety awareness    Rehab potential is good.    Activity tolerance: Good    Discharge recommendations: Discharge Facility/Level of Care Needs: home health PT, nursing facility, skilled     Barriers to discharge:      Equipment recommendations: Equipment Needed After Discharge: walker, rolling     GOALS:   Multidisciplinary Problems     Physical Therapy Goals        Problem: Physical Therapy Goal    Goal Priority Disciplines Outcome Goal Variances Interventions   Physical Therapy Goal     PT, PT/OT Ongoing, Progressing     Description:  LTG'S TO BE MET IN 7 DAYS (6-8-20)  1. PT WILL REQUIRE [facility] FOR BED MOBILITY  2. PT WILL REQUIRE [facility] FOR TF'S  3. PT WILL ' WITH OR WITHOUT RW AND SBA                    PLAN:    Patient to be seen 5 x/week  to address the above listed problems via gait training, therapeutic activities, therapeutic exercises  Plan of Care expires: 06/08/20  Plan of Care reviewed with: patient    Ildaemily Reynoldsard, PT  06/02/2020

## 2020-06-02 NOTE — ASSESSMENT & PLAN NOTE
- Resume Home med and adjust dose accordingly     5/30  Continue poc    5/31, 6/1  Improved  Normotensive    6/2  Added BB

## 2020-06-02 NOTE — DISCHARGE SUMMARY
[facility] Hospital Medicine  Discharge Summary      Patient Name: Yan Choudhury  MRN: [id number]  Admission Date: 5/28/2020  Hospital Length of Stay: 4 days  Discharge Date and Time:  06/02/2020 12:30 PM  Attending Physician: Tommy Christopher MD   Discharging Provider: Tommy Chrisotpher MD  Primary Care Provider: Sandra Estevez MD      HPI:   Yan Choudhury is a 65 y.o. male patient with a PMHx of type 2 DM, HLD, HTN, obesity, CAD, ischemic cardiomyopathy, diverticulitis of colon, CKD Stage 3,  CHF,  PPM, CAD, and JAHAIRA on CPAP and PSHx of cardiac catheterization who presents to the Emergency Department for evaluation of SOB which onset gradually x2 weeks ago . Pt states he has gained 10 lbs in x1 week. Symptoms are worsening and moderate in severity. No mitigating or exacerbating factors reported. Associated sx includes BLE edema. Patient denies any fever, chills, numbness, weakness, CP, smoking, cough, palpitations, and all other sxs at this time. Pt states he is compliant with his fluid pill. No further complaints or concerns at this time.  Pt reports compliance with prescribed medications.  Pt reports increased sodium consumption (crawfish) and denies increased fluid intake.  Pt denies smoking and use of ETOH.  Pt is followed outpatient by Dr. Estevez (PCP) and Dr. Calvillo (Cardiology).  Pt is a full code and Elsie Choudhury (wife) is the surrogate decision maker.  ER work up showed: INR 1.6, BUN/Creatinine 34/3, Glucose 111, T. Bili 1.7, AST 50, BNP 3059, Troponin 0.061, , and pO2 53.  Chest xray showed no acute process.  EKG showed SR with fusion complexes, L axis deviation, and nonspecific intraventricular block with HR 98.  Hospital Medicine contacted for admission with patient placed in Observation Unit for further evaluation.     * No surgery found *      Hospital Course:   · 5/29- On O2 at 2L/min . SpO2 is satisfactory . Pt still c/o SOB and wheezing  and appears significant volume overloaded on physical  exam . Denies chest pain.  Cardiology added Metolazone . Continue IV Bumex . CHF pathway initiated. Labs resulted Na 137, K 3.9, BUN 30, Creatinine 3.0 ( stable) , BNP on admit 3059. Echo resulted Left ventricular systolic function with EF  15%.Grade III (severe) left ventricular diastolic dysfunction , global hypokinesia and pul HTN with estimated PA pressure 63, mod to severe TR.   · 5/30: c/o sob and leg swelling. Continue iv bumex and metolazone. Echo reviewed. Discussed palliative option. Patient also c/o diarrhea. No abx given during hospitalization. tx sx and obtain stool cx  · 5/31: denies CP, SOB. Reports leg swelling same. Diarrhea resolved. Palliative on board. Will ask to revisit hospice care  6/1: denies sx of CP, SOB. Leg swelling improved but chronic. Lives at home with wife. Cards on board, possibly transition to PO diuretic tomorrow  6/2: patient seen and evaluated by me. Patient deemed stable for discharge. Patient less dyspneic, on RA. Transitioning to PO diuretic. Receiving breathing treatment. Updated wife regarding discharge plan. Discussed need to decide goals of care. Wife voiced understanding. Discharged home on BB, metolazone, and decreased insulin. Patient to f/u outpt for monitoring     Consults:   Consults (From admission, onward)        Status Ordering Provider     Inpatient consult to Cardiology  Once     Provider:  Zayra Mercer MD    Completed PEYMAN GORDON     Inpatient consult to Palliative Care  Once     Provider:  Monika Bruce PA-C    Completed NED HI     Inpatient consult to Registered Dietitian/Nutritionist  Once     Provider:  (Not yet assigned)    Completed PEYMAN GORDON     Inpatient consult to Social Work/Case Management  Once     Provider:  (Not yet assigned)    PEYMAN Keller          * Acute on chronic combined systolic and diastolic  heart failure with EF 15-20%  - Initiate CHF pathway   -Bumex 3 mg IV BID   -Start BB  -Continue Entresto    -Strict intake /Output   - Salt restriction 2 gram /day   -Fluid restriction 1.5 L/day   5/29- Metolazone added per Cardiology.  Assess response   5/30:   continues to have dyspnea and volume overload  Now with diarrhea  5/31  Appears dyspneic but not worsening per patient  Improving with iv diuresing   Palliative consulted for goals of care  May need to revisit hospice rec  Once transitions to PO diuretic, discharge   Physical therapy/ot consult for possible placement  6/1  Appears less dyspneic; on RA  Transitioning to PO tomorrow  May discharge home with h/h    6/2  Noticeable improvement  Does not qualify for home o2  Resume PO diuretic  Start BB and metolazone   F/u cards outpt 1-2 weeks    Elevated LFTs  - Likely hepatic congestion due to acute on chronic CHF( HFrEF)   -Expect to improve with diuresis     5/30  From hepatic congestion  Slightly elevated but trending down  Continue to monitor    5/31  Improving with diuresis     6/2  Monitor outpt    Type 2 diabetes mellitus, with long-term current use of insulin  - continue basal  Insulin and meal time bolus per sliding scale   -HgA1c 6.0 ( 8 mos ago) suggestive of good out patient control     5/31  Stable  Avoid hypoglycemic episodes  Hypoglycemic protocol  Avoid nephrotoxic agents    6/1  Hypoglycemic  Long acting held overnight    Agree with decreasing long acting to once daily to avoid hypoglycemic episode outpt  F/u outpt to monitor, hba1c 6.3, most recently    6/2   Decreased insulin dose to 20u once daily  F/u outpt to monitor      Unspecified essential hypertension  - Resume Home med and adjust dose accordingly     5/30  Continue poc    5/31, 6/1  Improved  Normotensive    6/2  Added BB      Final Active Diagnoses:    Diagnosis Date Noted POA    PRINCIPAL PROBLEM:  Acute on chronic combined systolic and diastolic  heart failure with EF 15-20% [I50.23] 05/05/2020 Yes    Elevated LFTs [R94.5] 05/28/2020 Yes    Type 2 diabetes mellitus, with  long-term current use of insulin [E11.9, Z79.4] 11/17/2016 Not Applicable    Biventricular ICD (implantable cardioverter-defibrillator) in place [Z95.810] 04/19/2016 Yes     Chronic    CKD (chronic kidney disease) stage 4, GFR 15-29 ml/min [N18.4] 05/22/2015 Yes    Pulmonary HTN [I27.20]  Yes    Unspecified essential hypertension [I10] 07/17/2013 Yes      Problems Resolved During this Admission:    Diagnosis Date Noted Date Resolved POA    Encounter for palliative care [Z51.5] 06/01/2020 06/02/2020 Not Applicable       Discharged Condition: stable    Disposition:     Follow Up:  Follow-up Information     Call Sandra Estevez MD.    Specialty:  Family Medicine  Why:  hospital follow up and medication management  Contact information:  [address]  [place] [address]  [phone]             Schedule an appointment as soon as possible for a visit with MIAH Street.    Specialty:  Cardiology  Why:  hospital follow up  Contact information:  [address] [facility] [id number] [phone]                 Patient Instructions:   No discharge procedures on file.    Significant Diagnostic Studies: Labs:   CMP   Recent Labs   Lab 06/01/20  0503 06/02/20  0428    137   K 4.2 3.8   CL 99 99   CO2 26 28   GLU 86 107   BUN 41* 44*   CREATININE 3.1* 2.8*   CALCIUM 8.6* 8.5*   ANIONGAP 12 10   ESTGFRAFRICA 23* 26*   EGFRNONAA 20* 22*   , CBC No results for input(s): WBC, HGB, HCT, PLT in the last 48 hours., Troponin   Recent Labs   Lab 05/28/20  1601   TROPONINI 0.061*    and A1C:   Recent Labs   Lab 05/28/20  1803   HGBA1C 6.3*  6.3*     Radiology: X-Ray: CXR: X-Ray Chest 1 View (CXR): No results found for this visit on 05/28/20.    Pending Diagnostic Studies:     None         Medications:  Reconciled Home Medications:      Medication List      ASK your doctor about these medications    albuterol 90 mcg/actuation inhaler  Commonly known as:  PROVENTIL/VENTOLIN HFA  Inhale 2 puffs into the lungs  "every 6 (six) hours as needed for Wheezing. Rescue     allopurinoL 100 MG tablet  Commonly known as:  ZYLOPRIM  Take 1 tablet (100 mg total) by mouth once daily.     aspirin 81 MG EC tablet  Commonly known as:  ECOTRIN  Take 81 mg by mouth every morning.     BD INSULIN SYRINGE ULTRA-FINE 0.5 mL 31 gauge x 5/16" Syrg  Generic drug:  insulin syringe-needle U-100  USE AS DIRECTED TO INJECT INSULIN TWO TIMES DAILY     bimatoprost 0.03 % ophthalmic drops  Commonly known as:  LUMIGAN  Place 1 drop into the left eye every evening.     blood sugar diagnostic Strp  1 strip by Misc.(Non-Drug; Combo Route) route 4 (four) times daily. Telcare     brimonidine-timoloL 0.2-0.5 % Drop  Commonly known as:  COMBIGAN  Place 1 drop into both eyes every 12 (twelve) hours.     bumetanide 2 MG tablet  Commonly known as:  BUMEX  TAKE 1 TABLET (2 MG TOTAL) BY MOUTH three times a day     dorzolamide-timolol 2-0.5% 22.3-6.8 mg/mL ophthalmic solution  Commonly known as:  COSOPT  Place 1 drop into both eyes 2 (two) times daily.     ENTRESTO 24-26 mg per tablet  Generic drug:  sacubitriL-valsartan  TAKE 1 TABLET BY MOUTH TWICE A DAY     GERITOL ORAL  Take by mouth.     insulin glargine 100 unit/mL injection  Commonly known as:  LANTUS U-100 INSULIN  INJECT 50 UNITS TWICE DAILY AS NEEDED WHEN BS< 150     ketoconazole 2 % cream  Commonly known as:  NIZORAL  AAA bid prn rash of groin. For Flares.     lancets Misc  For tel care     latanoprost 0.005 % ophthalmic solution  Commonly known as:  XALATAN  Place 1 drop into both eyes once daily.     MEN'S 50 PLUS MULTIVITAMIN ORAL  Take by mouth.     pantoprazole 40 MG tablet  Commonly known as:  PROTONIX  TAKE 1 TABLET BY MOUTH EVERY DAY FOR ACID REFLUX     pen needle, diabetic 32 gauge x 5/32" Ndle  Commonly known as:  BD ULTRA-FINE JOSLYN PEN NEEDLE  1 each by Misc.(Non-Drug; Combo Route) route 4 (four) times daily.     simvastatin 10 MG tablet  Commonly known as:  ZOCOR  Take 1 tablet (10 mg total) by " mouth every evening.     VITAMIN D3 25 mcg (1,000 unit) capsule  Generic drug:  cholecalciferol (vitamin D3)  Take 1,000 Units by mouth once daily.            Indwelling Lines/Drains at time of discharge:   Lines/Drains/Airways     None                 Time spent on the discharge of patient: 40 minutes  Patient was seen and examined on the date of discharge and determined to be suitable for discharge.         Tommy Christopher MD  Department of Hospital Medicine  [facility]

## 2020-06-02 NOTE — SUBJECTIVE & OBJECTIVE
Interval History: no acute issues noted o/n. Ok to discharge home with close cardiology follow up.     Review of Systems   Constitution: Positive for malaise/fatigue.   HENT: Negative for hearing loss and hoarse voice.    Eyes: Negative for blurred vision and visual disturbance.   Cardiovascular: Positive for leg swelling. Negative for chest pain, claudication, dyspnea on exertion, irregular heartbeat, near-syncope, orthopnea, palpitations, paroxysmal nocturnal dyspnea and syncope.   Respiratory: Negative for cough, hemoptysis, shortness of breath, sleep disturbances due to breathing, snoring and wheezing.    Endocrine: Negative for cold intolerance and heat intolerance.   Hematologic/Lymphatic: Does not bruise/bleed easily.   Skin: Negative for color change, dry skin and nail changes.   Musculoskeletal: Positive for arthritis and back pain. Negative for joint pain and myalgias.   Gastrointestinal: Negative for bloating, abdominal pain, constipation, nausea and vomiting.   Genitourinary: Negative for dysuria, flank pain, hematuria and hesitancy.   Neurological: Positive for weakness. Negative for headaches, light-headedness, loss of balance, numbness and paresthesias.   Psychiatric/Behavioral: Negative for altered mental status.   Allergic/Immunologic: Negative for environmental allergies.     Objective:     Vital Signs (Most Recent):  Temp: 97.9 °F (36.6 °C) (06/02/20 1107)  Pulse: 71 (06/02/20 1107)  Resp: 18 (06/02/20 0824)  BP: 108/66 (06/02/20 1107)  SpO2: 99 % (06/02/20 1107) Vital Signs (24h Range):  Temp:  [97.6 °F (36.4 °C)-98.8 °F (37.1 °C)] 97.9 °F (36.6 °C)  Pulse:  [63-82] 71  Resp:  [18-22] 18  SpO2:  [91 %-100 %] 99 %  BP: (108-139)/(66-91) 108/66     Weight: 102.9 kg (226 lb 13.7 oz)  Body mass index is 34.49 kg/m².     SpO2: 99 %  O2 Device (Oxygen Therapy): nasal cannula      Intake/Output Summary (Last 24 hours) at 6/2/2020 1606  Last data filed at 6/2/2020 0357  Gross per 24 hour   Intake 200 ml    Output 1340 ml   Net -1140 ml       Lines/Drains/Airways     Peripheral Intravenous Line                 Peripheral IV - Single Lumen 05/28/20 1601 22 G Right Wrist 4 days                Physical Exam   Constitutional: He is oriented to person, place, and time. He appears well-developed and well-nourished. No distress.   HENT:   Head: Normocephalic and atraumatic.   Eyes: Pupils are equal, round, and reactive to light.   Neck: Normal range of motion and full passive range of motion without pain. Neck supple. No JVD present.   Cardiovascular: Normal rate, regular rhythm, S1 normal, S2 normal and intact distal pulses. PMI is not displaced. Exam reveals no distant heart sounds.   No murmur heard.  Pulses:       Radial pulses are 2+ on the right side, and 2+ on the left side.        Dorsalis pedis pulses are 2+ on the right side, and 2+ on the left side.   Pulmonary/Chest: Effort normal. No accessory muscle usage. No respiratory distress. He has decreased breath sounds in the right lower field and the left lower field. He has no wheezes. He has no rales.   Abdominal: Soft. Bowel sounds are normal. He exhibits no distension. There is no tenderness.   Musculoskeletal: Normal range of motion. He exhibits edema.        Right ankle: He exhibits swelling.        Left ankle: He exhibits swelling.   Neurological: He is alert and oriented to person, place, and time.   Skin: Skin is warm and dry. He is not diaphoretic. No cyanosis. Nails show no clubbing.   Psychiatric: He has a normal mood and affect. His speech is normal and behavior is normal. Judgment and thought content normal. Cognition and memory are normal.   Nursing note and vitals reviewed.      Significant Labs:   BMP:   Recent Labs   Lab 06/01/20  0503 06/02/20  0428   GLU 86 107    137   K 4.2 3.8   CL 99 99   CO2 26 28   BUN 41* 44*   CREATININE 3.1* 2.8*   CALCIUM 8.6* 8.5*   , CBC No results for input(s): WBC, HGB, HCT, PLT in the last 48 hours. and All  pertinent lab results from the last 24 hours have been reviewed.    Significant Imaging: Echocardiogram:   Transthoracic echo (TTE) complete (Cupid Only):   Results for orders placed or performed during the hospital encounter of 05/28/20   Echo Color Flow Doppler? Yes   Result Value Ref Range    Ascending aorta 3.50 cm    STJ 3.56 cm    AV mean gradient 2 mmHg    Ao peak luis f 0.88 m/s    Ao VTI 16.61 cm    IVRT 78.43 msec    IVS 1.11 (A) 0.6 - 1.1 cm    LA size 4.16 cm    Left Atrium Major Axis 5.28 cm    Left Atrium Minor Axis 5.18 cm    LVIDD 5.55 3.5 - 6.0 cm    LVIDS 5.30 (A) 2.1 - 4.0 cm    LVOT diameter 2.01 cm    LVOT peak VTI 16.95 cm    PW 1.02 0.6 - 1.1 cm    MV Peak A Luis F 0.30 m/s    E wave decelartion time 141.72 msec    MV Peak E Luis F 0.79 m/s    RA Major Axis 4.80 cm    RVDD 3.62 cm    Sinus 3.66 cm    TAPSE 1.21 cm    TR Max Luis F 3.46 m/s    Ao root annulus 3.63 cm    LV Diastolic Volume 150.25 mL    LV Systolic Volume 135.34 mL    LVOT peak luis f 0.69 m/s    LA WIDTH 3.58 cm    RA Width 4.13 cm    FS 5 %    LA volume 66.20 cm3    LV mass 235.25 g    Left Ventricle Relative Wall Thickness 0.37 cm    AV valve area 3.24 cm2    AV Velocity Ratio 0.78     AV index (prosthetic) 1.02     E/A ratio 2.63     LVOT area 3.2 cm2    LVOT stroke volume 53.76 cm3    AV peak gradient 3 mmHg    LV Systolic Volume Index 62.2 mL/m2    LV Diastolic Volume Index 69.02 mL/m2    LA Volume Index 30.4 mL/m2    LV Mass Index 108 g/m2    Triscuspid Valve Regurgitation Peak Gradient 48 mmHg    BSA 2.25 m2    Right Atrial Pressure (from IVC) 15 mmHg    TV rest pulmonary artery pressure 63 mmHg    Narrative    · Mild left ventricular enlargement.  · Left ventricular systolic function. The estimated ejection fraction is   15%.  · Grade III (severe) left ventricular diastolic dysfunction consistent   with restrictive physiology.  · Severe global hypokinetic wall motion.  · Moderate right ventricular enlargement.  · Moderately reduced  right ventricular systolic function.  · Moderate to severe tricuspid regurgitation.  · Mild to moderate pulmonic regurgitation.  · Mild aortic regurgitation.  · Elevated central venous pressure (15 mmHg).  · The estimated PA systolic pressure is 63 mmHg.  · Pulmonary hypertension present.       and X-Ray: CXR: X-Ray Chest 1 View (CXR): No results found for this visit on 05/28/20. and X-Ray Chest PA and Lateral (CXR): No results found for this visit on 05/28/20.

## 2020-06-02 NOTE — PLAN OF CARE
CM met with patient at the bedside.  Patient denies any discharge needs at this time.  CM inquired about home oxygen and he stated that he does not have oxygen at home.  CM contacted patient's wife, Elsie, and she verified that patient does have a concentrator but no portable and that is was supplied by [facility].  YORDAN sent a secure chat to [facility] via secure chat to verify what equipment was provided.  Patient does not want home health.     06/02/20 0927   Discharge Reassessment   Assessment Type Discharge Planning Reassessment   Provided patient/caregiver education on the expected discharge date and the discharge plan Yes   Do you have any problems affording any of your prescribed medications? No   Discharge Plan A Home with family   DME Needed Upon Discharge  none   Patient choice form signed by patient/caregiver N/A   Anticipated Discharge Disposition Home   Can the patient/caregiver answer the patient profile reliably? Yes, cognitively intact

## 2020-06-02 NOTE — NURSING
20:12 - spoke w/ wife, Elsie Choudhury for approximately 10 minutes regarding pt & fixing pt's room phone so they could speak to one another. They were able to talk to one another on room phone. Updated wife's home & cell phone numbers on white board.   20:56 - spoke w/ Sukhjinderleigh ann Choudhury. He wanted to speak to pt. Informed him of pt's room phone number & that pt had spoke to pt's wife.  21:41 - missed call from Elsie Kei.  21:45 - missed call from Sukhjinder Choudhury.  22:00 - informed by KRISTIN Batres that family wanted to  pt & leave AMA. Gisel, RN stated that she had already informed Dr Diaz & situation was being addressed.  22:10 - in room w/ Dr Diaz & KRISTIN Batres. Dr Diaz did a video call w/ Sukhjinder Choudhury.

## 2020-06-02 NOTE — ASSESSMENT & PLAN NOTE
-Presented with decompensated combined CHF, in part due to dietary indiscretion  -Continue IV diuresis  -Add 5 mg of metolazone today and assess response  -Consider Lasix gtt if no improvement  -Continue Entresto, Coreg  -Repeat echo pending  -MPI stress test 6/19 negative for ischemia, + fixed defects    5/30   DIURESIS IN PROGRESS IMPROVING CLINICALLY.    5/31/202   SLOWLY IMPROVING  WITH CURRENT REGIMEN.    6/1  -Continue IV Bumex, BB, ASA, Metolazone, Entresto  -Dash diet, 2 gm sodium restriction  -1500 ml fluid restriction  -Daily weights  -Strict I/Os  -Reassess in AM    6/2  -OK to discharge home with close cardiology follow up  -Continue PO Bumex 2 mg BID, ASA, BB, Metolazone, Entresto  -Cardiac diet  -1500 ml fluid restriction  -Clinic will call to arrange follow up in next 1-2 weeks

## 2020-06-02 NOTE — ASSESSMENT & PLAN NOTE
- Likely hepatic congestion due to acute on chronic CHF( HFrEF)   -Expect to improve with diuresis     5/30  From hepatic congestion  Slightly elevated but trending down  Continue to monitor    5/31  Improving with diuresis     6/2  Monitor outpt

## 2020-06-02 NOTE — PLAN OF CARE
Jun10 Established Patient Visit with MANPREET Mir   Wednesday Paddy 10, 2020 10:00 AM   Arrive at check-in approximately 15 minutes before your scheduled appointment time. Bring all outside medical records and imaging, along with a list of your current medications and insurance card.  3rd Floor - From [address], take the [facility] exit ([address]). Enter the facility from the [address]  [facility]   [address]  [place] [address]   [phone]         06/02/20 1612   Final Note   Assessment Type Final Discharge Note   Anticipated Discharge Disposition Home   Hospital Follow Up  Appt(s) scheduled? Yes   Right [facility] Referral Info   Post Acute Recommendation No Care

## 2020-06-03 ENCOUNTER — PATIENT OUTREACH (OUTPATIENT)
Dept: ADMINISTRATIVE | Facility: CLINIC | Age: 65
End: 2020-06-03

## 2020-06-03 LAB — BACTERIA STL CULT: NORMAL

## 2020-06-03 NOTE — PATIENT INSTRUCTIONS

## 2020-06-04 ENCOUNTER — TELEPHONE (OUTPATIENT)
Dept: CARDIOLOGY | Facility: CLINIC | Age: 65
End: 2020-06-04

## 2020-06-04 DIAGNOSIS — E11.22 TYPE 2 DIABETES MELLITUS WITH STAGE 3 CHRONIC KIDNEY DISEASE, WITH LONG-TERM CURRENT USE OF INSULIN: ICD-10-CM

## 2020-06-04 DIAGNOSIS — N18.30 TYPE 2 DIABETES MELLITUS WITH STAGE 3 CHRONIC KIDNEY DISEASE, WITH LONG-TERM CURRENT USE OF INSULIN: ICD-10-CM

## 2020-06-04 DIAGNOSIS — Z79.4 TYPE 2 DIABETES MELLITUS WITH STAGE 3 CHRONIC KIDNEY DISEASE, WITH LONG-TERM CURRENT USE OF INSULIN: ICD-10-CM

## 2020-06-04 RX ORDER — LANCETS
EACH MISCELLANEOUS
Qty: 120 EACH | Refills: 11 | Status: SHIPPED | OUTPATIENT
Start: 2020-06-04

## 2020-06-04 NOTE — TELEPHONE ENCOUNTER
----- Message from Bobby Ma sent at 6/4/2020  3:12 PM CDT -----  Contact: pt   Patient needs a refill on FREESTYLE LANCETS and the pharmacy is stating that it isn't on file. Please call back at [phone] (home)     [id number] - [place] - [address] AT AT [facility]  [address] [place] [address]  Phone: [phone] Fax: [phone]

## 2020-06-04 NOTE — TELEPHONE ENCOUNTER
I called pt to f/u on chf symptoms since hospital d/c. No answer. Left message for pt to call me back.

## 2020-06-05 ENCOUNTER — OFFICE VISIT (OUTPATIENT)
Dept: INTERNAL MEDICINE | Facility: CLINIC | Age: 65
End: 2020-06-05
Payer: MEDICARE

## 2020-06-05 DIAGNOSIS — Z09 HOSPITAL DISCHARGE FOLLOW-UP: ICD-10-CM

## 2020-06-05 DIAGNOSIS — E11.22 TYPE 2 DIABETES MELLITUS WITH STAGE 4 CHRONIC KIDNEY DISEASE, WITH LONG-TERM CURRENT USE OF INSULIN: ICD-10-CM

## 2020-06-05 DIAGNOSIS — N18.4 CKD (CHRONIC KIDNEY DISEASE) STAGE 4, GFR 15-29 ML/MIN: ICD-10-CM

## 2020-06-05 DIAGNOSIS — I50.23 ACUTE ON CHRONIC SYSTOLIC HEART FAILURE: Primary | ICD-10-CM

## 2020-06-05 DIAGNOSIS — E78.5 HYPERLIPIDEMIA ASSOCIATED WITH TYPE 2 DIABETES MELLITUS: ICD-10-CM

## 2020-06-05 DIAGNOSIS — D63.1 ANEMIA DUE TO STAGE 3 CHRONIC KIDNEY DISEASE: ICD-10-CM

## 2020-06-05 DIAGNOSIS — I25.9 CHRONIC ISCHEMIC HEART DISEASE: ICD-10-CM

## 2020-06-05 DIAGNOSIS — E11.59 HYPERTENSION ASSOCIATED WITH DIABETES: ICD-10-CM

## 2020-06-05 DIAGNOSIS — Z79.4 TYPE 2 DIABETES MELLITUS WITH STAGE 4 CHRONIC KIDNEY DISEASE, WITH LONG-TERM CURRENT USE OF INSULIN: ICD-10-CM

## 2020-06-05 DIAGNOSIS — Z95.810 BIVENTRICULAR ICD (IMPLANTABLE CARDIOVERTER-DEFIBRILLATOR) IN PLACE: Chronic | ICD-10-CM

## 2020-06-05 DIAGNOSIS — L29.9 PRURITIC DISORDER: ICD-10-CM

## 2020-06-05 DIAGNOSIS — I15.2 HYPERTENSION ASSOCIATED WITH DIABETES: ICD-10-CM

## 2020-06-05 DIAGNOSIS — I25.10 CAD, MULTIPLE VESSEL: ICD-10-CM

## 2020-06-05 DIAGNOSIS — N18.4 TYPE 2 DIABETES MELLITUS WITH STAGE 4 CHRONIC KIDNEY DISEASE, WITH LONG-TERM CURRENT USE OF INSULIN: ICD-10-CM

## 2020-06-05 DIAGNOSIS — R79.89 ELEVATED LFTS: ICD-10-CM

## 2020-06-05 DIAGNOSIS — D63.1 ANEMIA IN STAGE 4 CHRONIC KIDNEY DISEASE: ICD-10-CM

## 2020-06-05 DIAGNOSIS — N18.4 ANEMIA IN STAGE 4 CHRONIC KIDNEY DISEASE: ICD-10-CM

## 2020-06-05 DIAGNOSIS — N18.30 ANEMIA DUE TO STAGE 3 CHRONIC KIDNEY DISEASE: ICD-10-CM

## 2020-06-05 DIAGNOSIS — E11.69 HYPERLIPIDEMIA ASSOCIATED WITH TYPE 2 DIABETES MELLITUS: ICD-10-CM

## 2020-06-05 PROBLEM — I50.33 ACUTE ON CHRONIC DIASTOLIC HEART FAILURE: Status: RESOLVED | Noted: 2020-05-28 | Resolved: 2020-06-05

## 2020-06-05 PROCEDURE — 99214 OFFICE O/P EST MOD 30 MIN: CPT | Mod: 95,,, | Performed by: FAMILY MEDICINE

## 2020-06-05 PROCEDURE — 99214 PR OFFICE/OUTPT VISIT, EST, LEVL IV, 30-39 MIN: ICD-10-PCS | Mod: 95,,, | Performed by: FAMILY MEDICINE

## 2020-06-05 RX ORDER — HYDROXYZINE HYDROCHLORIDE 10 MG/1
10 TABLET, FILM COATED ORAL DAILY PRN
Qty: 30 TABLET | Refills: 0 | Status: SHIPPED | OUTPATIENT
Start: 2020-06-05 | End: 2020-07-07 | Stop reason: SDUPTHER

## 2020-06-05 NOTE — PROGRESS NOTES
Subjective:       Patient ID: Yan Choudhury is a 65 y.o. male.    Chief Complaint: No chief complaint on file.    65-year-old  male patient with Patient Active Problem List:     Hypertension associated with diabetes     ED (erectile dysfunction)     Ischemic cardiomyopathy     Pulmonary HTN     Hyperlipidemia associated with type 2 diabetes mellitus     Loss of eye - Right Eye     Abnormal ECG     LBBB (left bundle branch block)     Abnormal stress test     Chronic combined systolic and diastolic congestive heart failure     Cysts of eyelids     CKD (chronic kidney disease) stage 4, GFR 15-29 ml/min     Primary open-angle glaucoma, moderate stage     Biventricular ICD (implantable cardioverter-defibrillator) in place     Type 2 diabetes mellitus, with long-term current use of insulin     Gastroesophageal reflux disease     Chronic gout without tophus     Vitamin D deficiency     Onychomycosis of multiple toenails with type 2 diabetes mellitus     Prosthetic eye globe     Screening for colon cancer     History of colostomy reversal     Nocturnal hypoxemia     Alteration in skin integrity     Nonrheumatic aortic valve insufficiency     NSVT (nonsustained ventricular tachycardia)     Hyperparathyroidism     CAD, multiple vessel     Acute on chronic combined systolic and diastolic  heart failure with EF 15-20%     Class 2 severe obesity due to excess calories with serious comorbidity and body mass index (BMI) of 35.0 to 35.9 in adult     Chronic ischemic heart disease     Elevated LFTs  Here for hospital discharge follow-up secondary to acute on chronic combined systolic and diastolic heart failure.   Patient reports that he has been breathing better, denies any significant leg swelling, has appointment with Cardiology next week.   Denies any chest pain or palpitations, diarrhea has resolved.   Patient has been weighing himself regularly and denies any significant weight gain since discharge      Reviewed  discharge summary showing    Admission Date: 5/28/2020  Hospital Length of Stay: 4 days  Discharge Date and Time:  06/02/2020 12:30 PM  Attending Physician: Tommy Christopher MD   Discharging Provider: Tommy Christopher MD  Primary Care Provider: Sandra Estevez MD        HPI:   Yan Choudhury is a 65 y.o. male patient with a PMHx of type 2 DM, HLD, HTN, obesity, CAD, ischemic cardiomyopathy, diverticulitis of colon, CKD Stage 3,  CHF,  PPM, CAD, and JAHAIRA on CPAP and PSHx of cardiac catheterization who presents to the Emergency Department for evaluation of SOB which onset gradually x2 weeks ago . Pt states he has gained 10 lbs in x1 week. Symptoms are worsening and moderate in severity. No mitigating or exacerbating factors reported. Associated sx includes BLE edema. Patient denies any fever, chills, numbness, weakness, CP, smoking, cough, palpitations, and all other sxs at this time. Pt states he is compliant with his fluid pill. No further complaints or concerns at this time.  Pt reports compliance with prescribed medications.  Pt reports increased sodium consumption (crawfish) and denies increased fluid intake.  Pt denies smoking and use of ETOH.  Pt is followed outpatient by Dr. Estevez (PCP) and Dr. Calvillo (Cardiology).  Pt is a full code and Elsie Choudhury (wife) is the surrogate decision maker.  ER work up showed: INR 1.6, BUN/Creatinine 34/3, Glucose 111, T. Bili 1.7, AST 50, BNP 3059, Troponin 0.061, , and pO2 53.  Chest xray showed no acute process.  EKG showed SR with fusion complexes, L axis deviation, and nonspecific intraventricular block with HR 98.  Hospital Medicine contacted for admission with patient placed in Observation Unit for further evaluation.        Hospital Course:   · 5/29- On O2 at 2L/min . SpO2 is satisfactory . Pt still c/o SOB and wheezing  and appears significant volume overloaded on physical exam . Denies chest pain.  Cardiology added Metolazone . Continue IV Bumex . CHF pathway initiated. Labs  resulted Na 137, K 3.9, BUN 30, Creatinine 3.0 ( stable) , BNP on admit 3059. Echo resulted Left ventricular systolic function with EF  15%.Grade III (severe) left ventricular diastolic dysfunction , global hypokinesia and pul HTN with estimated PA pressure 63, mod to severe TR.   · 5/30: c/o sob and leg swelling. Continue iv bumex and metolazone. Echo reviewed. Discussed palliative option. Patient also c/o diarrhea. No abx given during hospitalization. tx sx and obtain stool cx  · 5/31: denies CP, SOB. Reports leg swelling same. Diarrhea resolved. Palliative on board. Will ask to revisit hospice care  6/1: denies sx of CP, SOB. Leg swelling improved but chronic. Lives at home with wife. Cards on board, possibly transition to PO diuretic tomorrow  6/2: patient seen and evaluated by me. Patient deemed stable for discharge. Patient less dyspneic, on RA. Transitioning to PO diuretic. Receiving breathing treatment. Updated wife regarding discharge plan. Discussed need to decide goals of care. Wife voiced understanding. Discharged home on BB, metolazone, and decreased insulin. Patient to f/u outpt for monitoring       Patient reports itching all over the body.  Has not changed any soaps or lotions    Review of Systems   Constitutional: Negative for appetite change and fatigue.   Eyes: Negative for visual disturbance.   Respiratory: Negative for shortness of breath.    Cardiovascular: Negative for palpitations and leg swelling.   Gastrointestinal: Negative for abdominal pain, nausea and vomiting.   Musculoskeletal: Negative for myalgias.   Skin: Negative for rash.   Neurological: Negative for headaches.   Psychiatric/Behavioral: Negative for sleep disturbance.         There were no vitals taken for this visit.  Objective:      Physical Exam   Constitutional: He is oriented to person, place, and time.   Neurological: He is alert and oriented to person, place, and time.   Psychiatric: He has a normal mood and affect.                  Assessment/Plan:   1. Acute on chronic combined systolic and diastolic  heart failure with EF 15-20%  - Comprehensive metabolic panel; Future  - Lipid Panel; Future  - Brain Natriuretic Peptide; Future  2. Hospital discharge follow-up  - Comprehensive metabolic panel; Future  - Lipid Panel; Future  - Brain Natriuretic Peptide; Future    Patient was advised to continue current medications as per the discharge  Follow-up with Cardiology  Will check fasting labs prior to Cardiology appointment  Patient was encouraged to weigh himself regularly  Advised to think about palliative care/hospice    3. Hypertension associated with diabetes  - Comprehensive metabolic panel; Future  - Lipid Panel; Future      4. Hyperlipidemia associated with type 2 diabetes mellitus    5. Chronic ischemic heart disease    6. CKD (chronic kidney disease) stage 4, GFR 15-29 ml/min  - Comprehensive metabolic panel; Future    7. CAD, multiple vessel  8. Biventricular ICD (implantable cardioverter-defibrillator) in place    10. Anemia in stage 4 chronic kidney disease    11. Elevated LFTs  12. Type 2 diabetes mellitus with stage 4 chronic kidney disease, with long-term current use of insulin  - Hemoglobin A1C; Future    Will plan to repeat further labs    13. Pruritic disorder  - hydrOXYzine HCL (ATARAX) 10 MG Tab; Take 1 tablet (10 mg total) by mouth daily as needed.  Dispense: 30 tablet; Refill: 0    Reports pruritus, will do a trial of Atarax could be secondary to  elevated liver enzymes  Avoid scented soaps and lotions       Established Patient - Audio Only Telehealth Visit     The patient location is:  Home  The chief complaint leading to consultation is:  Hospital follow-up from congestive heart failure  Visit type: Virtual visit with audio only (telephone)  Total time spent with patient:  25 min       The reason for the audio only service rather than synchronous audio and video virtual visit was related to technical difficulties or  patient preference/necessity.     Each patient to whom I provide medical services by telemedicine is:  (1) informed of the relationship between the physician and patient and the respective role of any other health care provider with respect to management of the patient; and (2) notified that they may decline to receive medical services by telemedicine and may withdraw from such care at any time. Patient verbally consented to receive this service via voice-only telephone call.          This service was not originating from a related E/M service provided within the previous 7 days nor will  to an E/M service or procedure within the next 24 hours or my soonest available appointment.  Prevailing standard of care was able to be met in this audio-only visit.

## 2020-06-06 ENCOUNTER — LAB VISIT (OUTPATIENT)
Dept: LAB | Facility: HOSPITAL | Age: 65
End: 2020-06-06
Attending: FAMILY MEDICINE
Payer: MEDICARE

## 2020-06-06 DIAGNOSIS — N18.4 CKD (CHRONIC KIDNEY DISEASE) STAGE 4, GFR 15-29 ML/MIN: ICD-10-CM

## 2020-06-06 DIAGNOSIS — I15.2 HYPERTENSION ASSOCIATED WITH DIABETES: ICD-10-CM

## 2020-06-06 DIAGNOSIS — E11.59 HYPERTENSION ASSOCIATED WITH DIABETES: ICD-10-CM

## 2020-06-06 DIAGNOSIS — Z09 HOSPITAL DISCHARGE FOLLOW-UP: ICD-10-CM

## 2020-06-06 DIAGNOSIS — E11.22 TYPE 2 DIABETES MELLITUS WITH STAGE 4 CHRONIC KIDNEY DISEASE, WITH LONG-TERM CURRENT USE OF INSULIN: ICD-10-CM

## 2020-06-06 DIAGNOSIS — I50.23 ACUTE ON CHRONIC SYSTOLIC HEART FAILURE: ICD-10-CM

## 2020-06-06 DIAGNOSIS — Z79.4 TYPE 2 DIABETES MELLITUS WITH STAGE 4 CHRONIC KIDNEY DISEASE, WITH LONG-TERM CURRENT USE OF INSULIN: ICD-10-CM

## 2020-06-06 DIAGNOSIS — N18.4 TYPE 2 DIABETES MELLITUS WITH STAGE 4 CHRONIC KIDNEY DISEASE, WITH LONG-TERM CURRENT USE OF INSULIN: ICD-10-CM

## 2020-06-06 LAB
ALBUMIN SERPL BCP-MCNC: 2.8 G/DL (ref 3.5–5.2)
ALP SERPL-CCNC: 81 U/L (ref 55–135)
ALT SERPL W/O P-5'-P-CCNC: 17 U/L (ref 10–44)
ANION GAP SERPL CALC-SCNC: 8 MMOL/L (ref 8–16)
AST SERPL-CCNC: 37 U/L (ref 10–40)
BILIRUB SERPL-MCNC: 0.9 MG/DL (ref 0.1–1)
BNP SERPL-MCNC: 2367 PG/ML (ref 0–99)
BUN SERPL-MCNC: 41 MG/DL (ref 8–23)
CALCIUM SERPL-MCNC: 9.1 MG/DL (ref 8.7–10.5)
CHLORIDE SERPL-SCNC: 94 MMOL/L (ref 95–110)
CHOLEST SERPL-MCNC: 85 MG/DL (ref 120–199)
CHOLEST/HDLC SERPL: 2.7 {RATIO} (ref 2–5)
CO2 SERPL-SCNC: 33 MMOL/L (ref 23–29)
CREAT SERPL-MCNC: 2.7 MG/DL (ref 0.5–1.4)
EST. GFR  (AFRICAN AMERICAN): 27 ML/MIN/1.73 M^2
EST. GFR  (NON AFRICAN AMERICAN): 23.3 ML/MIN/1.73 M^2
ESTIMATED AVG GLUCOSE: 128 MG/DL (ref 68–131)
GLUCOSE SERPL-MCNC: 132 MG/DL (ref 70–110)
HBA1C MFR BLD HPLC: 6.1 % (ref 4–5.6)
HDLC SERPL-MCNC: 31 MG/DL (ref 40–75)
HDLC SERPL: 36.5 % (ref 20–50)
LDLC SERPL CALC-MCNC: 40.4 MG/DL (ref 63–159)
NONHDLC SERPL-MCNC: 54 MG/DL
POTASSIUM SERPL-SCNC: 3.8 MMOL/L (ref 3.5–5.1)
PROT SERPL-MCNC: 6.7 G/DL (ref 6–8.4)
SODIUM SERPL-SCNC: 135 MMOL/L (ref 136–145)
TRIGL SERPL-MCNC: 68 MG/DL (ref 30–150)

## 2020-06-06 PROCEDURE — 83036 HEMOGLOBIN GLYCOSYLATED A1C: CPT

## 2020-06-06 PROCEDURE — 80061 LIPID PANEL: CPT

## 2020-06-06 PROCEDURE — 36415 COLL VENOUS BLD VENIPUNCTURE: CPT

## 2020-06-06 PROCEDURE — 83880 ASSAY OF NATRIURETIC PEPTIDE: CPT

## 2020-06-06 PROCEDURE — 80053 COMPREHEN METABOLIC PANEL: CPT

## 2020-06-09 NOTE — PROGRESS NOTES
Subjective:   Patient ID:  Yan Choudhury is a 65 y.o. male who presents for follow up of Congestive Heart Failure      HPI   Mr. Choudhury's current medical conditions include CHF, CAD/ICM, DCM, LBBB, HTN, CRI, DM, CRT-ICD, PTHN, dyslipidemia, obesity. Nonsmoker    LHC was done 2009 showed diffuse CAD, and severe distal CAD involving apical LAD, distal RCA/distal LCX.   Echo Oct 2010 showed LVEF 25%.   PET stress 2016 showed inferior scar, no significant ischemia noted and LVEF < 35%.   s/p BIV ICD 3/16 for CHF/LBBB.  Has no device firing  Echo April 2019 EF 15 - 20%, LVE, mod AI, mild-mod TR, mild MR, severe PHTN 77 mmHg.  Stress MPI 6/19 multivessel scar, trivial ischemia, EF 28%    Seen by Dr. Calvillo in May 2020 and noted to have 30 lbs weight gain and elevated BNP. Bumex increased to 2mg BID x1 week, but required hospital admission for worsening SOB, wheezing and edema. Has not gained any weight since hospital discharge. Was previously on Metolazone, but stopped some time ago due to renal failure.     Echo during admission revealed Left ventricular systolic function with EF 15%.Grade III (severe) left ventricular diastolic dysfunction , global hypokinesia and pul HTN with estimated PA pressure 63, mod to severe TR.   Diuresed with IV Bumex and metolazone.   Currently taking Bumex 2mg TID and metolazone 5mg daily.  Last creatine 2.7, K 3.8 and BNP 2,367  Has no ICD firing   Does complain of stable SOB and LE edema despite taking the above diuretic dose.  Denies any chest pain, orthopnea (sleeping on 1 pillow), PND, dizziness, palpitations, near syncope, syncope. Has no symptoms concerning for angina or equivalent. No CNS Complaints to suggest TIA or CVA. Does well with limiting sodium intake.    PCP has discussed palliative care with patient and spouse. Is open to idea when that time comes     Past Medical History:   Diagnosis Date    Arthritis     CAD (coronary artery disease)     Cataract     Chronic combined  systolic and diastolic congestive heart failure 3/24/2015    CKD (chronic kidney disease), stage III     Diabetes mellitus type II      AM    Diabetic retinopathy     anti Veg F injections for macular edema    Diverticulitis of colon     ED (erectile dysfunction)     Encounter for blood transfusion     Glaucoma     HTN (hypertension)     Hyperlipidemia     Ischemic cardiomyopathy     Obesity     JAHAIRA on CPAP     Pacemaker     Pulmonary HTN        Past Surgical History:   Procedure Laterality Date    CARDIAC CATHETERIZATION  2009    CHOLECYSTECTOMY      COLON SURGERY      Sigmoid resection    COLONOSCOPY N/A 10/11/2018    Procedure: COLONOSCOPY;  Surgeon: Quinn Aragon MD;  Location: [facility] ENDO;  Service: General;  Laterality: N/A;    EYE SURGERY      FLEXIBLE SIGMOIDOSCOPY N/A 1/18/2019    Procedure: SIGMOIDOSCOPY, FLEXIBLE;  Surgeon: Quinn Aragon MD;  Location: [facility] ENDO;  Service: General;  Laterality: N/A;    ILEOSTOMY N/A 10/30/2018    Procedure: CREATION, ILEOSTOMY;  Surgeon: Kevin Lindsay MD;  Location: [facility] OR;  Service: General;  Laterality: N/A;    Ileostomy reversal  01/2019    KNEE SURGERY      MOBILIZATION OF SPLENIC FLEXURE N/A 10/30/2018    Procedure: MOBILIZATION, SPLENIC FLEXURE;  Surgeon: Kevin Lindsay MD;  Location: [facility] OR;  Service: General;  Laterality: N/A;    REVISION COLOSTOMY N/A 10/30/2018    Procedure: REVISION OR CLOSURE, COLOSTOMY;  Surgeon: Kevin Lindsay MD;  Location: [facility] OR;  Service: General;  Laterality: N/A;  Parastomal Hernia Repair    RIGHT HEMICOLECTOMY Right 10/30/2018    Procedure: HEMICOLECTOMY, RIGHT;  Surgeon: Kevin Lindsay MD;  Location: [facility] OR;  Service: General;  Laterality: Right;       Social History     Tobacco Use    Smoking status: Never Smoker    Smokeless tobacco: Never Used   Substance Use Topics    Alcohol use: No     Alcohol/week: 0.0 standard drinks     Frequency: Never    Drug use: No       Family  History   Problem Relation Age of Onset    Cancer Mother     Heart disease Father     Stroke Father     No Known Problems Sister     No Known Problems Brother     No Known Problems Maternal Aunt     No Known Problems Maternal Uncle     No Known Problems Paternal Aunt     No Known Problems Paternal Uncle     No Known Problems Maternal Grandmother     No Known Problems Maternal Grandfather     No Known Problems Paternal Grandmother     No Known Problems Paternal Grandfather     Amblyopia Neg Hx     Blindness Neg Hx     Cataracts Neg Hx     Diabetes Neg Hx     Glaucoma Neg Hx     Hypertension Neg Hx     Macular degeneration Neg Hx     Retinal detachment Neg Hx     Strabismus Neg Hx     Thyroid disease Neg Hx        Current Outpatient Medications   Medication Sig    albuterol (PROVENTIL/VENTOLIN HFA) 90 mcg/actuation inhaler Inhale 2 puffs into the lungs every 6 (six) hours as needed for Wheezing. Rescue    allopurinoL (ZYLOPRIM) 100 MG tablet Take 1 tablet (100 mg total) by mouth once daily.    aspirin (ECOTRIN) 81 MG EC tablet Take 81 mg by mouth every morning.     bimatoprost (LUMIGAN) 0.03 % ophthalmic drops Place 1 drop into the left eye every evening.    brimonidine-timolol (COMBIGAN) 0.2-0.5 % Drop Place 1 drop into both eyes every 12 (twelve) hours.    bumetanide (BUMEX) 2 MG tablet TAKE 1 TABLET (2 MG TOTAL) BY MOUTH three times a day    carvediloL (COREG) 3.125 MG tablet Take 1 tablet (3.125 mg total) by mouth 2 (two) times daily with meals.    cholecalciferol, vitamin D3, (VITAMIN D3) 1,000 unit capsule Take 1,000 Units by mouth once daily.    dorzolamide-timolol 2-0.5% (COSOPT) 22.3-6.8 mg/mL ophthalmic solution Place 1 drop into both eyes 2 (two) times daily.    ENTRESTO 24-26 mg per tablet TAKE 1 TABLET BY MOUTH TWICE A DAY    hydrOXYzine HCL (ATARAX) 10 MG Tab Take 1 tablet (10 mg total) by mouth daily as needed.    insulin glargine (LANTUS U-100 INSULIN) 100 unit/mL  "injection INJECT 20 UNITS ONCE DAILY AS NEEDED WHEN BS< 150    IRON/VITAMIN B COMPLEX (GERITOL ORAL) Take by mouth.    ketoconazole (NIZORAL) 2 % cream AAA bid prn rash of groin. For Flares.    latanoprost (XALATAN) 0.005 % ophthalmic solution Place 1 drop into both eyes once daily.    metOLazone (ZAROXOLYN) 5 MG tablet Take 1 tablet (5 mg total) by mouth once daily.    multivit-min/folic/vit K/lycop (MEN'S 50 PLUS MULTIVITAMIN ORAL) Take by mouth.    pantoprazole (PROTONIX) 40 MG tablet TAKE 1 TABLET BY MOUTH EVERY DAY FOR ACID REFLUX    simvastatin (ZOCOR) 10 MG tablet Take 1 tablet (10 mg total) by mouth every evening.    BD INSULIN SYRINGE ULTRA-FINE 0.5 mL 31 gauge x 5/16 Syrg USE AS DIRECTED TO INJECT INSULIN TWO TIMES DAILY    blood sugar diagnostic Strp 1 strip by Misc.(Non-Drug; Combo Route) route 4 (four) times daily. [employer]    lancets [facility] For tel care    pen needle, diabetic (BD ULTRA-FINE JOSLYN PEN NEEDLE) 32 gauge x 5/32" Ndle 1 each by Misc.(Non-Drug; Combo Route) route 4 (four) times daily.     No current facility-administered medications for this visit.      Current Outpatient Medications on File Prior to Visit   Medication Sig    albuterol (PROVENTIL/VENTOLIN HFA) 90 mcg/actuation inhaler Inhale 2 puffs into the lungs every 6 (six) hours as needed for Wheezing. Rescue    allopurinoL (ZYLOPRIM) 100 MG tablet Take 1 tablet (100 mg total) by mouth once daily.    aspirin (ECOTRIN) 81 MG EC tablet Take 81 mg by mouth every morning.     bimatoprost (LUMIGAN) 0.03 % ophthalmic drops Place 1 drop into the left eye every evening.    brimonidine-timolol (COMBIGAN) 0.2-0.5 % Drop Place 1 drop into both eyes every 12 (twelve) hours.    bumetanide (BUMEX) 2 MG tablet TAKE 1 TABLET (2 MG TOTAL) BY MOUTH three times a day    carvediloL (COREG) 3.125 MG tablet Take 1 tablet (3.125 mg total) by mouth 2 (two) times daily with meals.    cholecalciferol, vitamin D3, (VITAMIN D3) 1,000 unit capsule " "Take 1,000 Units by mouth once daily.    dorzolamide-timolol 2-0.5% (COSOPT) 22.3-6.8 mg/mL ophthalmic solution Place 1 drop into both eyes 2 (two) times daily.    ENTRESTO 24-26 mg per tablet TAKE 1 TABLET BY MOUTH TWICE A DAY    hydrOXYzine HCL (ATARAX) 10 MG Tab Take 1 tablet (10 mg total) by mouth daily as needed.    insulin glargine (LANTUS U-100 INSULIN) 100 unit/mL injection INJECT 20 UNITS ONCE DAILY AS NEEDED WHEN BS< 150    IRON/VITAMIN B COMPLEX (GERITOL ORAL) Take by mouth.    ketoconazole (NIZORAL) 2 % cream AAA bid prn rash of groin. For Flares.    latanoprost (XALATAN) 0.005 % ophthalmic solution Place 1 drop into both eyes once daily.    metOLazone (ZAROXOLYN) 5 MG tablet Take 1 tablet (5 mg total) by mouth once daily.    multivit-min/folic/vit K/lycop (MEN'S 50 PLUS MULTIVITAMIN ORAL) Take by mouth.    pantoprazole (PROTONIX) 40 MG tablet TAKE 1 TABLET BY MOUTH EVERY DAY FOR ACID REFLUX    simvastatin (ZOCOR) 10 MG tablet Take 1 tablet (10 mg total) by mouth every evening.    BD INSULIN SYRINGE ULTRA-FINE 0.5 mL 31 gauge x 5/16 Syrg USE AS DIRECTED TO INJECT INSULIN TWO TIMES DAILY    blood sugar diagnostic Strp 1 strip by Misc.(Non-Drug; Combo Route) route 4 (four) times daily. [employer]    lancets [facility] For tel care    pen needle, diabetic (BD ULTRA-FINE JOSLYN PEN NEEDLE) 32 gauge x 5/32" Ndle 1 each by Misc.(Non-Drug; Combo Route) route 4 (four) times daily.     No current facility-administered medications on file prior to visit.        Review of Systems   Constitution: Negative for diaphoresis, malaise/fatigue, weight gain and weight loss.   HENT: Negative for congestion and nosebleeds.    Cardiovascular: Positive for leg swelling. Negative for chest pain, claudication, cyanosis, dyspnea on exertion, irregular heartbeat, near-syncope, orthopnea, palpitations, paroxysmal nocturnal dyspnea and syncope.   Respiratory: Positive for shortness of breath. Negative for cough, hemoptysis, " "sleep disturbances due to breathing, snoring, sputum production and wheezing.    Hematologic/Lymphatic: Negative for bleeding problem. Does not bruise/bleed easily.   Skin: Negative for rash.   Musculoskeletal: Negative for arthritis, back pain, falls, joint pain, muscle cramps and muscle weakness.   Gastrointestinal: Negative for abdominal pain, constipation, diarrhea, heartburn, hematemesis, hematochezia, melena, nausea and vomiting.   Genitourinary: Negative for dysuria, hematuria and nocturia.   Neurological: Negative for excessive daytime sleepiness, dizziness, headaches, light-headedness, loss of balance, numbness, vertigo and weakness.       Objective:   Physical Exam   Constitutional: He is oriented to person, place, and time. He appears well-developed and well-nourished.   Neck: Neck supple. No JVD present.   Cardiovascular: Normal rate, regular rhythm, normal heart sounds and normal pulses. Exam reveals no friction rub.   No murmur heard.  Pulmonary/Chest: Effort normal. No respiratory distress. He has no wheezes. He has rales ( faint rales bilaterally ).   Device pocket WNL    Abdominal: Soft. Bowel sounds are normal. He exhibits no distension.   Musculoskeletal: He exhibits edema ( 1-2 + BLE edema). He exhibits no tenderness.   Neurological: He is alert and oriented to person, place, and time.   Skin: Skin is warm and dry. No rash noted.   Psychiatric: He has a normal mood and affect. His behavior is normal.   Nursing note and vitals reviewed.    Vitals:    06/10/20 0958   BP: 101/66   BP Location: Left arm   Patient Position: Sitting   BP Method: Medium (Automatic)   Pulse: 68   SpO2: 99%   Weight: 90.3 kg (199 lb 1.2 oz)   Height: 5' 8" (1.727 m)     Lab Results   Component Value Date    CHOL 85 (L) 06/06/2020    CHOL 139 11/27/2019    CHOL 121 10/08/2019     Lab Results   Component Value Date    HDL 31 (L) 06/06/2020    HDL 44 11/27/2019    HDL 34 (L) 10/08/2019     Lab Results   Component Value Date "    LDLCALC 40.4 (L) 06/06/2020    LDLCALC 73.2 11/27/2019    LDLCALC 55.4 (L) 10/08/2019     Lab Results   Component Value Date    TRIG 68 06/06/2020    TRIG 109 11/27/2019    TRIG 158 (H) 10/08/2019     Lab Results   Component Value Date    CHOLHDL 36.5 06/06/2020    CHOLHDL 31.7 11/27/2019    CHOLHDL 28.1 10/08/2019       Chemistry        Component Value Date/Time     (L) 06/06/2020 0920    K 3.8 06/06/2020 0920    CL 94 (L) 06/06/2020 0920    CO2 33 (H) 06/06/2020 0920    BUN 41 (H) 06/06/2020 0920    CREATININE 2.7 (H) 06/06/2020 0920     (H) 06/06/2020 0920        Component Value Date/Time    CALCIUM 9.1 06/06/2020 0920    ALKPHOS 81 06/06/2020 0920    AST 37 06/06/2020 0920    ALT 17 06/06/2020 0920    BILITOT 0.9 06/06/2020 0920    ESTGFRAFRICA 27.0 (A) 06/06/2020 0920    EGFRNONAA 23.3 (A) 06/06/2020 0920          Lab Results   Component Value Date    TSH 2.857 05/28/2020     Lab Results   Component Value Date    INR 1.6 (H) 05/28/2020    INR 1.2 04/02/2019    INR 1.1 11/16/2018     Lab Results   Component Value Date    WBC 5.34 05/30/2020    HGB 12.8 (L) 05/30/2020    HCT 42.0 05/30/2020    MCV 81 (L) 05/30/2020     05/30/2020     BMP  Sodium   Date Value Ref Range Status   06/06/2020 135 (L) 136 - 145 mmol/L Final     Potassium   Date Value Ref Range Status   06/06/2020 3.8 3.5 - 5.1 mmol/L Final     Chloride   Date Value Ref Range Status   06/06/2020 94 (L) 95 - 110 mmol/L Final     CO2   Date Value Ref Range Status   06/06/2020 33 (H) 23 - 29 mmol/L Final     BUN, Bld   Date Value Ref Range Status   06/06/2020 41 (H) 8 - 23 mg/dL Final     Creatinine   Date Value Ref Range Status   06/06/2020 2.7 (H) 0.5 - 1.4 mg/dL Final     Calcium   Date Value Ref Range Status   06/06/2020 9.1 8.7 - 10.5 mg/dL Final     Anion Gap   Date Value Ref Range Status   06/06/2020 8 8 - 16 mmol/L Final     eGFR if    Date Value Ref Range Status   06/06/2020 27.0 (A) >60 mL/min/1.73 m^2 Final      eGFR if non    Date Value Ref Range Status   06/06/2020 23.3 (A) >60 mL/min/1.73 m^2 Final     Comment:     Calculation used to obtain the estimated glomerular filtration  rate (eGFR) is the CKD-EPI equation.        Estimated Creatinine Clearance: 29 mL/min (A) (based on SCr of 2.7 mg/dL (H)).    Assessment:     1. Chronic combined systolic and diastolic congestive heart failure    2. Biventricular ICD (implantable cardioverter-defibrillator) in place        Plan:     Will check renal function to see if he is tolerating Bumex 2mg TID and metolazone 5mg daily   He has low LVF and has had recent admission for CHF. Had stable renal failure at last check. Likely cardiorenal in nature.   Should he continue to have issues with fluid retention despite high dose diuretics, he may need to be readmitted for IV diuresis or consider home dobutamine vs. Hospice care.   Heart healthy diet  Limit fluid intake 50-60 oz   Daily weights and to notify clinic if weight increases by more than 3 lbs in 1 day or 5 lbs in 1 week.   Continue Entresto and Coreg for now  RTC in 1 month for CHF check

## 2020-06-10 ENCOUNTER — LAB VISIT (OUTPATIENT)
Dept: LAB | Facility: HOSPITAL | Age: 65
End: 2020-06-10
Attending: NURSE PRACTITIONER
Payer: MEDICARE

## 2020-06-10 ENCOUNTER — OFFICE VISIT (OUTPATIENT)
Dept: CARDIOLOGY | Facility: CLINIC | Age: 65
End: 2020-06-10
Payer: COMMERCIAL

## 2020-06-10 VITALS
BODY MASS INDEX: 30.17 KG/M2 | SYSTOLIC BLOOD PRESSURE: 101 MMHG | HEIGHT: 68 IN | OXYGEN SATURATION: 99 % | WEIGHT: 199.06 LBS | DIASTOLIC BLOOD PRESSURE: 66 MMHG | HEART RATE: 68 BPM

## 2020-06-10 DIAGNOSIS — I50.42 CHRONIC COMBINED SYSTOLIC AND DIASTOLIC CONGESTIVE HEART FAILURE: ICD-10-CM

## 2020-06-10 DIAGNOSIS — I50.42 CHRONIC COMBINED SYSTOLIC AND DIASTOLIC CONGESTIVE HEART FAILURE: Primary | ICD-10-CM

## 2020-06-10 DIAGNOSIS — Z95.810 BIVENTRICULAR ICD (IMPLANTABLE CARDIOVERTER-DEFIBRILLATOR) IN PLACE: Chronic | ICD-10-CM

## 2020-06-10 LAB
ALBUMIN SERPL BCP-MCNC: 3 G/DL (ref 3.5–5.2)
ALP SERPL-CCNC: 85 U/L (ref 55–135)
ALT SERPL W/O P-5'-P-CCNC: 14 U/L (ref 10–44)
ANION GAP SERPL CALC-SCNC: 9 MMOL/L (ref 8–16)
AST SERPL-CCNC: 38 U/L (ref 10–40)
BILIRUB SERPL-MCNC: 0.7 MG/DL (ref 0.1–1)
BNP SERPL-MCNC: 2199 PG/ML (ref 0–99)
BUN SERPL-MCNC: 47 MG/DL (ref 8–23)
CALCIUM SERPL-MCNC: 8.5 MG/DL (ref 8.7–10.5)
CHLORIDE SERPL-SCNC: 96 MMOL/L (ref 95–110)
CO2 SERPL-SCNC: 31 MMOL/L (ref 23–29)
CREAT SERPL-MCNC: 3.3 MG/DL (ref 0.5–1.4)
EST. GFR  (AFRICAN AMERICAN): 21 ML/MIN/1.73 M^2
EST. GFR  (NON AFRICAN AMERICAN): 18 ML/MIN/1.73 M^2
GLUCOSE SERPL-MCNC: 104 MG/DL (ref 70–110)
POTASSIUM SERPL-SCNC: 3.7 MMOL/L (ref 3.5–5.1)
PROT SERPL-MCNC: 6.8 G/DL (ref 6–8.4)
SODIUM SERPL-SCNC: 136 MMOL/L (ref 136–145)

## 2020-06-10 PROCEDURE — 99999 PR PBB SHADOW E&M-EST. PATIENT-LVL IV: ICD-10-PCS | Mod: PBBFAC,,, | Performed by: NURSE PRACTITIONER

## 2020-06-10 PROCEDURE — 99214 OFFICE O/P EST MOD 30 MIN: CPT | Mod: PBBFAC | Performed by: NURSE PRACTITIONER

## 2020-06-10 PROCEDURE — 99999 PR PBB SHADOW E&M-EST. PATIENT-LVL IV: CPT | Mod: PBBFAC,,, | Performed by: NURSE PRACTITIONER

## 2020-06-10 PROCEDURE — 99214 PR OFFICE/OUTPT VISIT, EST, LEVL IV, 30-39 MIN: ICD-10-PCS | Mod: S$GLB,,, | Performed by: NURSE PRACTITIONER

## 2020-06-10 PROCEDURE — 36415 COLL VENOUS BLD VENIPUNCTURE: CPT

## 2020-06-10 PROCEDURE — 80053 COMPREHEN METABOLIC PANEL: CPT

## 2020-06-10 PROCEDURE — 99214 OFFICE O/P EST MOD 30 MIN: CPT | Mod: S$GLB,,, | Performed by: NURSE PRACTITIONER

## 2020-06-10 PROCEDURE — 83880 ASSAY OF NATRIURETIC PEPTIDE: CPT

## 2020-06-11 ENCOUNTER — TELEPHONE (OUTPATIENT)
Dept: CARDIOLOGY | Facility: CLINIC | Age: 65
End: 2020-06-11

## 2020-06-11 NOTE — TELEPHONE ENCOUNTER
Please inform patient that fluid levels remain elevated and renal function worsened on repeat labs. If he is having worsening SOB or edema, he needs to be admitted for IV diuretics and even needs to be considered for IV Dobutamine given his low LVF of 15%. This was discussed during his visit.

## 2020-06-11 NOTE — TELEPHONE ENCOUNTER
The patient has been notified of this information and all questions answered.      Please inform patient that fluid levels remain elevated and renal function worsened on repeat labs. If he is having worsening SOB or edema, he needs to be admitted for IV diuretics and even needs to be considered for IV Dobutamine given his low LVF of 15%. This was discussed during his visit.

## 2020-06-17 ENCOUNTER — HOSPITAL ENCOUNTER (INPATIENT)
Facility: HOSPITAL | Age: 65
LOS: 9 days | Discharge: HOME HEALTH | DRG: 871 | End: 2020-06-26
Attending: EMERGENCY MEDICINE | Admitting: INTERNAL MEDICINE
Payer: MEDICARE

## 2020-06-17 DIAGNOSIS — N18.9 ACUTE RENAL FAILURE SUPERIMPOSED ON CHRONIC KIDNEY DISEASE, UNSPECIFIED CKD STAGE, UNSPECIFIED ACUTE RENAL FAILURE TYPE: ICD-10-CM

## 2020-06-17 DIAGNOSIS — N17.9 ACUTE RENAL FAILURE SUPERIMPOSED ON CHRONIC KIDNEY DISEASE, UNSPECIFIED CKD STAGE, UNSPECIFIED ACUTE RENAL FAILURE TYPE: ICD-10-CM

## 2020-06-17 DIAGNOSIS — R79.89 TROPONIN I ABOVE REFERENCE RANGE: ICD-10-CM

## 2020-06-17 DIAGNOSIS — U07.1 COVID-19 VIRUS INFECTION: Primary | ICD-10-CM

## 2020-06-17 DIAGNOSIS — R06.02 SOB (SHORTNESS OF BREATH): ICD-10-CM

## 2020-06-17 DIAGNOSIS — I50.42 CHRONIC COMBINED SYSTOLIC AND DIASTOLIC CONGESTIVE HEART FAILURE: ICD-10-CM

## 2020-06-17 DIAGNOSIS — N18.4 STAGE 4 CHRONIC KIDNEY DISEASE: ICD-10-CM

## 2020-06-17 DIAGNOSIS — R06.02 SHORTNESS OF BREATH: ICD-10-CM

## 2020-06-17 DIAGNOSIS — N18.4 CKD (CHRONIC KIDNEY DISEASE) STAGE 4, GFR 15-29 ML/MIN: ICD-10-CM

## 2020-06-17 DIAGNOSIS — N17.9 PRERENAL ACUTE RENAL FAILURE: ICD-10-CM

## 2020-06-17 PROBLEM — A41.9 SEPSIS: Status: ACTIVE | Noted: 2020-06-17

## 2020-06-17 LAB
ALBUMIN SERPL BCP-MCNC: 2.5 G/DL (ref 3.5–5.2)
ALP SERPL-CCNC: 87 U/L (ref 55–135)
ALT SERPL W/O P-5'-P-CCNC: 25 U/L (ref 10–44)
ANION GAP SERPL CALC-SCNC: 10 MMOL/L (ref 8–16)
AST SERPL-CCNC: 59 U/L (ref 10–40)
BACTERIA #/AREA URNS HPF: NORMAL /HPF
BASOPHILS # BLD AUTO: 0.01 K/UL (ref 0–0.2)
BASOPHILS NFR BLD: 0.3 % (ref 0–1.9)
BILIRUB SERPL-MCNC: 1 MG/DL (ref 0.1–1)
BILIRUB UR QL STRIP: NEGATIVE
BNP SERPL-MCNC: 1874 PG/ML (ref 0–99)
BUN SERPL-MCNC: 62 MG/DL (ref 8–23)
CALCIUM SERPL-MCNC: 8.1 MG/DL (ref 8.7–10.5)
CHLORIDE SERPL-SCNC: 99 MMOL/L (ref 95–110)
CLARITY UR: CLEAR
CO2 SERPL-SCNC: 22 MMOL/L (ref 23–29)
COLOR UR: YELLOW
CREAT SERPL-MCNC: 4.1 MG/DL (ref 0.5–1.4)
CRP SERPL-MCNC: 60.5 MG/L (ref 0–8.2)
D DIMER PPP IA.FEU-MCNC: 1.78 MG/L FEU
DIFFERENTIAL METHOD: ABNORMAL
EOSINOPHIL # BLD AUTO: 0 K/UL (ref 0–0.5)
EOSINOPHIL NFR BLD: 0.6 % (ref 0–8)
ERYTHROCYTE [DISTWIDTH] IN BLOOD BY AUTOMATED COUNT: 20.2 % (ref 11.5–14.5)
ERYTHROCYTE [SEDIMENTATION RATE] IN BLOOD BY WESTERGREN METHOD: 40 MM/HR (ref 0–10)
EST. GFR  (AFRICAN AMERICAN): 16 ML/MIN/1.73 M^2
EST. GFR  (NON AFRICAN AMERICAN): 14 ML/MIN/1.73 M^2
FERRITIN SERPL-MCNC: 130 NG/ML (ref 20–300)
GLUCOSE SERPL-MCNC: 119 MG/DL (ref 70–110)
GLUCOSE UR QL STRIP: NEGATIVE
HCT VFR BLD AUTO: 40.5 % (ref 40–54)
HGB BLD-MCNC: 12.6 G/DL (ref 14–18)
HGB UR QL STRIP: ABNORMAL
HYALINE CASTS #/AREA URNS LPF: 0 /LPF
IMM GRANULOCYTES # BLD AUTO: 0.01 K/UL (ref 0–0.04)
IMM GRANULOCYTES NFR BLD AUTO: 0.3 % (ref 0–0.5)
INR PPP: 1.1 (ref 0.8–1.2)
KETONES UR QL STRIP: NEGATIVE
LACTATE SERPL-SCNC: 0.8 MMOL/L (ref 0.5–2.2)
LDH SERPL L TO P-CCNC: 325 U/L (ref 110–260)
LEUKOCYTE ESTERASE UR QL STRIP: NEGATIVE
LYMPHOCYTES # BLD AUTO: 1.1 K/UL (ref 1–4.8)
LYMPHOCYTES NFR BLD: 36.4 % (ref 18–48)
MCH RBC QN AUTO: 24 PG (ref 27–31)
MCHC RBC AUTO-ENTMCNC: 31.1 G/DL (ref 32–36)
MCV RBC AUTO: 77 FL (ref 82–98)
MICROSCOPIC COMMENT: NORMAL
MONOCYTES # BLD AUTO: 0.4 K/UL (ref 0.3–1)
MONOCYTES NFR BLD: 13.1 % (ref 4–15)
NEUTROPHILS # BLD AUTO: 1.5 K/UL (ref 1.8–7.7)
NEUTROPHILS NFR BLD: 49.3 % (ref 38–73)
NITRITE UR QL STRIP: NEGATIVE
NRBC BLD-RTO: 0 /100 WBC
PH UR STRIP: 6 [PH] (ref 5–8)
PLATELET # BLD AUTO: 103 K/UL (ref 150–350)
PMV BLD AUTO: ABNORMAL FL (ref 9.2–12.9)
POCT GLUCOSE: 124 MG/DL (ref 70–110)
POCT GLUCOSE: 144 MG/DL (ref 70–110)
POTASSIUM SERPL-SCNC: 3.7 MMOL/L (ref 3.5–5.1)
PROT SERPL-MCNC: 6.6 G/DL (ref 6–8.4)
PROT UR QL STRIP: ABNORMAL
PROTHROMBIN TIME: 11.8 SEC (ref 9–12.5)
RBC # BLD AUTO: 5.26 M/UL (ref 4.6–6.2)
RBC #/AREA URNS HPF: 2 /HPF (ref 0–4)
SARS-COV-2 RDRP RESP QL NAA+PROBE: POSITIVE
SODIUM SERPL-SCNC: 131 MMOL/L (ref 136–145)
SP GR UR STRIP: 1.01 (ref 1–1.03)
TROPONIN I SERPL DL<=0.01 NG/ML-MCNC: 0.14 NG/ML (ref 0–0.03)
URN SPEC COLLECT METH UR: ABNORMAL
UROBILINOGEN UR STRIP-ACNC: NEGATIVE EU/DL
WBC # BLD AUTO: 3.13 K/UL (ref 3.9–12.7)
WBC #/AREA URNS HPF: 0 /HPF (ref 0–5)

## 2020-06-17 PROCEDURE — 99900035 HC TECH TIME PER 15 MIN (STAT)

## 2020-06-17 PROCEDURE — 94761 N-INVAS EAR/PLS OXIMETRY MLT: CPT

## 2020-06-17 PROCEDURE — 99223 PR INITIAL HOSPITAL CARE,LEVL III: ICD-10-PCS | Mod: 25,CR,, | Performed by: INTERNAL MEDICINE

## 2020-06-17 PROCEDURE — 80053 COMPREHEN METABOLIC PANEL: CPT

## 2020-06-17 PROCEDURE — 36415 COLL VENOUS BLD VENIPUNCTURE: CPT

## 2020-06-17 PROCEDURE — 99223 1ST HOSP IP/OBS HIGH 75: CPT | Mod: 25,CR,, | Performed by: INTERNAL MEDICINE

## 2020-06-17 PROCEDURE — 84484 ASSAY OF TROPONIN QUANT: CPT

## 2020-06-17 PROCEDURE — 27000221 HC OXYGEN, UP TO 24 HOURS

## 2020-06-17 PROCEDURE — 93005 ELECTROCARDIOGRAM TRACING: CPT

## 2020-06-17 PROCEDURE — 85651 RBC SED RATE NONAUTOMATED: CPT

## 2020-06-17 PROCEDURE — 86140 C-REACTIVE PROTEIN: CPT

## 2020-06-17 PROCEDURE — 83880 ASSAY OF NATRIURETIC PEPTIDE: CPT

## 2020-06-17 PROCEDURE — 94640 AIRWAY INHALATION TREATMENT: CPT

## 2020-06-17 PROCEDURE — 83615 LACTATE (LD) (LDH) ENZYME: CPT

## 2020-06-17 PROCEDURE — 87040 BLOOD CULTURE FOR BACTERIA: CPT | Mod: 59

## 2020-06-17 PROCEDURE — 25000003 PHARM REV CODE 250: Performed by: NURSE PRACTITIONER

## 2020-06-17 PROCEDURE — 83605 ASSAY OF LACTIC ACID: CPT

## 2020-06-17 PROCEDURE — 82728 ASSAY OF FERRITIN: CPT

## 2020-06-17 PROCEDURE — 99291 CRITICAL CARE FIRST HOUR: CPT | Mod: 25

## 2020-06-17 PROCEDURE — 85025 COMPLETE CBC W/AUTO DIFF WBC: CPT

## 2020-06-17 PROCEDURE — 25000003 PHARM REV CODE 250: Performed by: EMERGENCY MEDICINE

## 2020-06-17 PROCEDURE — C9399 UNCLASSIFIED DRUGS OR BIOLOG: HCPCS | Performed by: NURSE PRACTITIONER

## 2020-06-17 PROCEDURE — 96360 HYDRATION IV INFUSION INIT: CPT

## 2020-06-17 PROCEDURE — U0002 COVID-19 LAB TEST NON-CDC: HCPCS

## 2020-06-17 PROCEDURE — 82962 GLUCOSE BLOOD TEST: CPT

## 2020-06-17 PROCEDURE — 85610 PROTHROMBIN TIME: CPT

## 2020-06-17 PROCEDURE — 25000242 PHARM REV CODE 250 ALT 637 W/ HCPCS: Performed by: NURSE PRACTITIONER

## 2020-06-17 PROCEDURE — 81000 URINALYSIS NONAUTO W/SCOPE: CPT

## 2020-06-17 PROCEDURE — 21400001 HC TELEMETRY ROOM

## 2020-06-17 PROCEDURE — 96361 HYDRATE IV INFUSION ADD-ON: CPT

## 2020-06-17 PROCEDURE — 85379 FIBRIN DEGRADATION QUANT: CPT

## 2020-06-17 RX ORDER — ASPIRIN 81 MG/1
81 TABLET ORAL EVERY MORNING
Status: DISCONTINUED | OUTPATIENT
Start: 2020-06-18 | End: 2020-06-26 | Stop reason: HOSPADM

## 2020-06-17 RX ORDER — PANTOPRAZOLE SODIUM 40 MG/1
40 TABLET, DELAYED RELEASE ORAL DAILY
Status: DISCONTINUED | OUTPATIENT
Start: 2020-06-18 | End: 2020-06-26 | Stop reason: HOSPADM

## 2020-06-17 RX ORDER — INSULIN ASPART 100 [IU]/ML
1-10 INJECTION, SOLUTION INTRAVENOUS; SUBCUTANEOUS
Status: DISCONTINUED | OUTPATIENT
Start: 2020-06-17 | End: 2020-06-26 | Stop reason: HOSPADM

## 2020-06-17 RX ORDER — DORZOLAMIDE HYDROCHLORIDE AND TIMOLOL MALEATE 20; 5 MG/ML; MG/ML
1 SOLUTION/ DROPS OPHTHALMIC 2 TIMES DAILY
Status: DISCONTINUED | OUTPATIENT
Start: 2020-06-17 | End: 2020-06-26 | Stop reason: HOSPADM

## 2020-06-17 RX ORDER — ALLOPURINOL 100 MG/1
100 TABLET ORAL DAILY
Status: DISCONTINUED | OUTPATIENT
Start: 2020-06-17 | End: 2020-06-18

## 2020-06-17 RX ORDER — BRIMONIDINE TARTRATE AND TIMOLOL MALEATE 2; 5 MG/ML; MG/ML
1 SOLUTION OPHTHALMIC EVERY 12 HOURS
Status: DISCONTINUED | OUTPATIENT
Start: 2020-06-17 | End: 2020-06-17 | Stop reason: SDUPTHER

## 2020-06-17 RX ORDER — CARVEDILOL 3.12 MG/1
3.12 TABLET ORAL 2 TIMES DAILY WITH MEALS
Status: DISCONTINUED | OUTPATIENT
Start: 2020-06-17 | End: 2020-06-26 | Stop reason: HOSPADM

## 2020-06-17 RX ORDER — LATANOPROST 50 UG/ML
1 SOLUTION/ DROPS OPHTHALMIC DAILY
Status: DISCONTINUED | OUTPATIENT
Start: 2020-06-17 | End: 2020-06-26 | Stop reason: HOSPADM

## 2020-06-17 RX ORDER — GLUCAGON 1 MG
1 KIT INJECTION
Status: DISCONTINUED | OUTPATIENT
Start: 2020-06-17 | End: 2020-06-26 | Stop reason: HOSPADM

## 2020-06-17 RX ORDER — ASCORBIC ACID 500 MG
500 TABLET ORAL 2 TIMES DAILY
Status: DISCONTINUED | OUTPATIENT
Start: 2020-06-17 | End: 2020-06-18

## 2020-06-17 RX ORDER — SODIUM CHLORIDE 9 MG/ML
INJECTION, SOLUTION INTRAVENOUS CONTINUOUS
Status: ACTIVE | OUTPATIENT
Start: 2020-06-17 | End: 2020-06-17

## 2020-06-17 RX ORDER — IBUPROFEN 200 MG
24 TABLET ORAL
Status: DISCONTINUED | OUTPATIENT
Start: 2020-06-17 | End: 2020-06-26 | Stop reason: HOSPADM

## 2020-06-17 RX ORDER — SIMVASTATIN 5 MG/1
10 TABLET, FILM COATED ORAL NIGHTLY
Status: DISCONTINUED | OUTPATIENT
Start: 2020-06-17 | End: 2020-06-19

## 2020-06-17 RX ORDER — DOXYCYCLINE HYCLATE 100 MG
100 TABLET ORAL EVERY 12 HOURS
Status: DISCONTINUED | OUTPATIENT
Start: 2020-06-17 | End: 2020-06-22

## 2020-06-17 RX ORDER — SODIUM CHLORIDE 0.9 % (FLUSH) 0.9 %
10 SYRINGE (ML) INJECTION
Status: DISCONTINUED | OUTPATIENT
Start: 2020-06-17 | End: 2020-06-26 | Stop reason: HOSPADM

## 2020-06-17 RX ORDER — ASPIRIN 325 MG
325 TABLET ORAL
Status: COMPLETED | OUTPATIENT
Start: 2020-06-17 | End: 2020-06-17

## 2020-06-17 RX ORDER — ONDANSETRON 2 MG/ML
4 INJECTION INTRAMUSCULAR; INTRAVENOUS EVERY 8 HOURS PRN
Status: DISCONTINUED | OUTPATIENT
Start: 2020-06-17 | End: 2020-06-26 | Stop reason: HOSPADM

## 2020-06-17 RX ORDER — ALBUTEROL SULFATE 90 UG/1
2 AEROSOL, METERED RESPIRATORY (INHALATION) EVERY 8 HOURS
Status: DISCONTINUED | OUTPATIENT
Start: 2020-06-17 | End: 2020-06-24

## 2020-06-17 RX ORDER — IBUPROFEN 200 MG
16 TABLET ORAL
Status: DISCONTINUED | OUTPATIENT
Start: 2020-06-17 | End: 2020-06-26 | Stop reason: HOSPADM

## 2020-06-17 RX ORDER — ACETAMINOPHEN 325 MG/1
650 TABLET ORAL EVERY 4 HOURS PRN
Status: DISCONTINUED | OUTPATIENT
Start: 2020-06-17 | End: 2020-06-26 | Stop reason: HOSPADM

## 2020-06-17 RX ADMIN — SIMVASTATIN 10 MG: 5 TABLET, FILM COATED ORAL at 09:06

## 2020-06-17 RX ADMIN — SODIUM CHLORIDE: 0.9 INJECTION, SOLUTION INTRAVENOUS at 03:06

## 2020-06-17 RX ADMIN — DORZOLAMIDE HYDROCHLORIDE AND TIMOLOL MALEATE 1 DROP: 20; 5 SOLUTION/ DROPS OPHTHALMIC at 09:06

## 2020-06-17 RX ADMIN — DOXYCYCLINE HYCLATE 100 MG: 100 TABLET, COATED ORAL at 12:06

## 2020-06-17 RX ADMIN — DOXYCYCLINE HYCLATE 100 MG: 100 TABLET, COATED ORAL at 09:06

## 2020-06-17 RX ADMIN — ASPIRIN 325 MG: 325 TABLET, FILM COATED ORAL at 09:06

## 2020-06-17 RX ADMIN — SODIUM CHLORIDE 250 ML: 9 INJECTION, SOLUTION INTRAVENOUS at 12:06

## 2020-06-17 RX ADMIN — SODIUM CHLORIDE 250 ML: 9 INJECTION, SOLUTION INTRAVENOUS at 09:06

## 2020-06-17 RX ADMIN — OXYCODONE HYDROCHLORIDE AND ACETAMINOPHEN 500 MG: 500 TABLET ORAL at 12:06

## 2020-06-17 RX ADMIN — INSULIN DETEMIR 10 UNITS: 100 INJECTION, SOLUTION SUBCUTANEOUS at 09:06

## 2020-06-17 RX ADMIN — OXYCODONE HYDROCHLORIDE AND ACETAMINOPHEN 500 MG: 500 TABLET ORAL at 09:06

## 2020-06-17 RX ADMIN — ALLOPURINOL 100 MG: 100 TABLET ORAL at 01:06

## 2020-06-17 RX ADMIN — ALBUTEROL SULFATE 2 PUFF: 90 AEROSOL, METERED RESPIRATORY (INHALATION) at 04:06

## 2020-06-17 RX ADMIN — THERA TABS 1 TABLET: TAB at 12:06

## 2020-06-17 NOTE — ED NOTES
Spoke to pharmacy they are speaking with hospital medicine. Cheyenne gil regarding latanoprost eye drops. There is a issue. Was instructed not to give until we have a final say. Tele nurse gabby was informed.

## 2020-06-17 NOTE — ASSESSMENT & PLAN NOTE
Criteria:  RR rate > 20, WBC 3.13  Lactic acid normal  B/P appropriate on arrival but did decline to 82/62 as pt is volume depleted  Blood cultures drawn  Normal saline 500 cc bolus given  Doxycyline given

## 2020-06-17 NOTE — ASSESSMENT & PLAN NOTE
- COVID-19 testing - positive result  - Infection Control notified     - Isolation:   - Airborne, Contact and Droplet Precautions  - Cohort patients into COVID units  - N95 masks must be fit tested, wear eye protection  - 20 second hand hygiene              - Limit visitors per hospital policy              - Consolidating lab draws, nursing care, provider visits, and interventions    - Diagnostics: (leukopenia, hyponatremia, hyperferritinemia, elevated troponin, elevated d-dimer, age, and comorbidities are significant predictors of poor clinical outcome)  CBC, CMP, Ferritin, CRP, LDH, Blood Culture x2 and Portable CXR    - Management:  Supplemental O2 to maintain SpO2 >92%  Telemetry  Continuous/intermittent Pulse Ox  Albuterol treatment PRN  Doxycycline for infiltrate  Vitamin C, multi vitamin  Monitor labs every 48 hours  Advance Care Planning     Code Status - FULL CODE according to patient's wife

## 2020-06-17 NOTE — SUBJECTIVE & OBJECTIVE
Past Medical History:   Diagnosis Date    Arthritis     CAD (coronary artery disease)     Cataract     Chronic combined systolic and diastolic congestive heart failure 3/24/2015    CKD (chronic kidney disease), stage III     Diabetes mellitus type II      AM    Diabetic retinopathy     anti Veg F injections for macular edema    Diverticulitis of colon     ED (erectile dysfunction)     Encounter for blood transfusion     Glaucoma     HTN (hypertension)     Hyperlipidemia     Ischemic cardiomyopathy     Obesity     JAHAIRA on CPAP     Pacemaker     Pulmonary HTN        Past Surgical History:   Procedure Laterality Date    CARDIAC CATHETERIZATION  2009    CHOLECYSTECTOMY      COLON SURGERY      Sigmoid resection    COLONOSCOPY N/A 10/11/2018    Procedure: COLONOSCOPY;  Surgeon: Quinn Aragon MD;  Location: [facility] ENDO;  Service: General;  Laterality: N/A;    EYE SURGERY      FLEXIBLE SIGMOIDOSCOPY N/A 1/18/2019    Procedure: SIGMOIDOSCOPY, FLEXIBLE;  Surgeon: Quinn Aragon MD;  Location: [facility] ENDO;  Service: General;  Laterality: N/A;    ILEOSTOMY N/A 10/30/2018    Procedure: CREATION, ILEOSTOMY;  Surgeon: Kevin Lindsay MD;  Location: [facility] OR;  Service: General;  Laterality: N/A;    Ileostomy reversal  01/2019    KNEE SURGERY      MOBILIZATION OF SPLENIC FLEXURE N/A 10/30/2018    Procedure: MOBILIZATION, SPLENIC FLEXURE;  Surgeon: Kevin Lindsay MD;  Location: [facility] OR;  Service: General;  Laterality: N/A;    REVISION COLOSTOMY N/A 10/30/2018    Procedure: REVISION OR CLOSURE, COLOSTOMY;  Surgeon: Kevin Lindsay MD;  Location: [facility] OR;  Service: General;  Laterality: N/A;  Parastomal Hernia Repair    RIGHT HEMICOLECTOMY Right 10/30/2018    Procedure: HEMICOLECTOMY, RIGHT;  Surgeon: Kevin Lindsay MD;  Location: [facility] OR;  Service: General;  Laterality: Right;       Review of patient's allergies indicates:  No Known Allergies    No current facility-administered  medications on file prior to encounter.      Current Outpatient Medications on File Prior to Encounter   Medication Sig    albuterol (PROVENTIL/VENTOLIN HFA) 90 mcg/actuation inhaler Inhale 2 puffs into the lungs every 6 (six) hours as needed for Wheezing. Rescue    allopurinoL (ZYLOPRIM) 100 MG tablet Take 1 tablet (100 mg total) by mouth once daily.    aspirin (ECOTRIN) 81 MG EC tablet Take 81 mg by mouth every morning.     BD INSULIN SYRINGE ULTRA-FINE 0.5 mL 31 gauge x 5/16 Syrg USE AS DIRECTED TO INJECT INSULIN TWO TIMES DAILY    bimatoprost (LUMIGAN) 0.03 % ophthalmic drops Place 1 drop into the left eye every evening.    blood sugar diagnostic Strp 1 strip by Misc.(Non-Drug; Combo Route) route 4 (four) times daily. Telcare    brimonidine-timolol (COMBIGAN) 0.2-0.5 % Drop Place 1 drop into both eyes every 12 (twelve) hours.    bumetanide (BUMEX) 2 MG tablet TAKE 1 TABLET (2 MG TOTAL) BY MOUTH three times a day    carvediloL (COREG) 3.125 MG tablet Take 1 tablet (3.125 mg total) by mouth 2 (two) times daily with meals.    cholecalciferol, vitamin D3, (VITAMIN D3) 1,000 unit capsule Take 1,000 Units by mouth once daily.    dorzolamide-timolol 2-0.5% (COSOPT) 22.3-6.8 mg/mL ophthalmic solution Place 1 drop into both eyes 2 (two) times daily.    ENTRESTO 24-26 mg per tablet TAKE 1 TABLET BY MOUTH TWICE A DAY    hydrOXYzine HCL (ATARAX) 10 MG Tab Take 1 tablet (10 mg total) by mouth daily as needed.    insulin glargine (LANTUS U-100 INSULIN) 100 unit/mL injection INJECT 20 UNITS ONCE DAILY AS NEEDED WHEN BS< 150    IRON/VITAMIN B COMPLEX (GERITOL ORAL) Take by mouth.    latanoprost (XALATAN) 0.005 % ophthalmic solution Place 1 drop into both eyes once daily.    metOLazone (ZAROXOLYN) 5 MG tablet Take 1 tablet (5 mg total) by mouth once daily.    multivit-min/folic/vit K/lycop (MEN'S 50 PLUS MULTIVITAMIN ORAL) Take by mouth.    pantoprazole (PROTONIX) 40 MG tablet TAKE 1 TABLET BY MOUTH EVERY DAY  "FOR ACID REFLUX    simvastatin (ZOCOR) 10 MG tablet Take 1 tablet (10 mg total) by mouth every evening.    ketoconazole (NIZORAL) 2 % cream AAA bid prn rash of groin. For Flares.    lancets Misc For tel care    pen needle, diabetic (BD ULTRA-FINE JOSLYN PEN NEEDLE) 32 gauge x 5/32" Ndle 1 each by Misc.(Non-Drug; Combo Route) route 4 (four) times daily.     Family History     Problem Relation (Age of Onset)    Cancer Mother    Heart disease Father    No Known Problems Sister, Brother, Maternal Aunt, Maternal Uncle, Paternal Aunt, Paternal Uncle, Maternal Grandmother, Maternal Grandfather, Paternal Grandmother, Paternal Grandfather    Stroke Father        Tobacco Use    Smoking status: Never Smoker    Smokeless tobacco: Never Used   Substance and Sexual Activity    Alcohol use: No     Alcohol/week: 0.0 standard drinks     Frequency: Never    Drug use: No    Sexual activity: Not Currently     Review of Systems   Constitution: Positive for malaise/fatigue.   Cardiovascular: Positive for dyspnea on exertion, leg swelling and orthopnea.   Respiratory: Positive for cough and shortness of breath.    Psychiatric/Behavioral: Positive for altered mental status.     Objective:     Vital Signs (Most Recent):  Temp: 97 °F (36.1 °C) (06/17/20 0812)  Pulse: 81 (06/17/20 1047)  Resp: 20 (06/17/20 1047)  BP: 104/72 (06/17/20 1031)  SpO2: 100 % (06/17/20 1047) Vital Signs (24h Range):  Temp:  [97 °F (36.1 °C)] 97 °F (36.1 °C)  Pulse:  [74-88] 81  Resp:  [20-27] 20  SpO2:  [95 %-100 %] 100 %  BP: ()/(56-72) 104/72     Weight: 84.9 kg (187 lb 1.6 oz)  Body mass index is 28.45 kg/m².    SpO2: 100 %  O2 Device (Oxygen Therapy): room air    No intake or output data in the 24 hours ending 06/17/20 1048  NO physical exam due to +covid 19 status  Lines/Drains/Airways     Peripheral Intravenous Line                 Peripheral IV - Single Lumen 06/17/20 0830 18 G Right Antecubital less than 1 day                Physical " Exam    Significant Labs:   BMP:   Recent Labs   Lab 06/17/20  0830   *   *   K 3.7   CL 99   CO2 22*   BUN 62*   CREATININE 4.1*   CALCIUM 8.1*   , CBC   Recent Labs   Lab 06/17/20  0830   WBC 3.13*   HGB 12.6*   HCT 40.5   *   , Troponin   Recent Labs   Lab 06/17/20  0830   TROPONINI 0.139*    and All pertinent lab results from the last 24 hours have been reviewed.    Significant Imaging: Echocardiogram:   Transthoracic echo (TTE) complete (Cupid Only):   Results for orders placed or performed during the hospital encounter of 05/28/20   Echo Color Flow Doppler? Yes   Result Value Ref Range    Ascending aorta 3.50 cm    STJ 3.56 cm    AV mean gradient 2 mmHg    Ao peak luis f 0.88 m/s    Ao VTI 16.61 cm    IVRT 78.43 msec    IVS 1.11 (A) 0.6 - 1.1 cm    LA size 4.16 cm    Left Atrium Major Axis 5.28 cm    Left Atrium Minor Axis 5.18 cm    LVIDD 5.55 3.5 - 6.0 cm    LVIDS 5.30 (A) 2.1 - 4.0 cm    LVOT diameter 2.01 cm    LVOT peak VTI 16.95 cm    PW 1.02 0.6 - 1.1 cm    MV Peak A Luis F 0.30 m/s    E wave decelartion time 141.72 msec    MV Peak E Luis F 0.79 m/s    RA Major Axis 4.80 cm    RVDD 3.62 cm    Sinus 3.66 cm    TAPSE 1.21 cm    TR Max Luis F 3.46 m/s    Ao root annulus 3.63 cm    LV Diastolic Volume 150.25 mL    LV Systolic Volume 135.34 mL    LVOT peak luis f 0.69 m/s    LA WIDTH 3.58 cm    RA Width 4.13 cm    FS 5 %    LA volume 66.20 cm3    LV mass 235.25 g    Left Ventricle Relative Wall Thickness 0.37 cm    AV valve area 3.24 cm2    AV Velocity Ratio 0.78     AV index (prosthetic) 1.02     E/A ratio 2.63     LVOT area 3.2 cm2    LVOT stroke volume 53.76 cm3    AV peak gradient 3 mmHg    LV Systolic Volume Index 62.2 mL/m2    LV Diastolic Volume Index 69.02 mL/m2    LA Volume Index 30.4 mL/m2    LV Mass Index 108 g/m2    Triscuspid Valve Regurgitation Peak Gradient 48 mmHg    BSA 2.25 m2    Right Atrial Pressure (from IVC) 15 mmHg    TV rest pulmonary artery pressure 63 mmHg    Narrative    ·  Mild left ventricular enlargement.  · Left ventricular systolic function. The estimated ejection fraction is   15%.  · Grade III (severe) left ventricular diastolic dysfunction consistent   with restrictive physiology.  · Severe global hypokinetic wall motion.  · Moderate right ventricular enlargement.  · Moderately reduced right ventricular systolic function.  · Moderate to severe tricuspid regurgitation.  · Mild to moderate pulmonic regurgitation.  · Mild aortic regurgitation.  · Elevated central venous pressure (15 mmHg).  · The estimated PA systolic pressure is 63 mmHg.  · Pulmonary hypertension present.       and X-Ray: CXR: X-Ray Chest 1 View (CXR): No results found for this visit on 06/17/20. and X-Ray Chest PA and Lateral (CXR): No results found for this visit on 06/17/20.

## 2020-06-17 NOTE — HPI
Pt is a 64 yo male with PMHx of combined CHF with EF 15 %, CKD III, DM II and ischemic cardiomyopathy who presents to the ED due to weakness. Wife provides history as patient is a difficult historian. Wife explains pt has had a decreased appetite with increasing weakness onset for approx 6 days. Wife says that Cardiology mentioned possible dehydration. Wife says that pt has not been eating or drinking regularly with increased weakness, walking slow and increased fatigue. While in the ED, nurse states that pt was tachypnic with respiratory rate 26.   On arrival: Temp 97, pulse 74, resp 22, B/P 99/56 and pulse ox 95 on RA.   CXR - Slight increase in the the vascular markings suggested, a little greater at the right base.  Mild congestion?  No overt failure.    BNP = 1874 (last admission 2300)  Labs find WBC 3.13, Na 131, serum creatinine 4.1 (baseline 2.7), glucose 119  PT IS COVID +  Pt has received a saline bolus 250 cc in the ED. Afterwards, blood pressure noted 82/62 and a second saline bolus of 250 cc given.

## 2020-06-17 NOTE — NURSING
Patient admitted to room 236 from ED via stretcher with 2L O2 via NC. Tele monitor placed on patient and verified with monitor tech. VSS. Pt had no c/o pain; very lethargic but easily aroused by voice. Personal belongings in reach, side rails x3, bed alarm set and call bell within reach. Will continue to monitor.

## 2020-06-17 NOTE — CONSULTS
[facility]  Cardiology  Consult Note    Patient Name: Yan Choudhury  MRN: [id number]  Admission Date: 6/17/2020  Hospital Length of Stay: 0 days  Code Status: Prior   Attending Provider: Orlando Simpson MD   Consulting Provider: MIAH Street  Primary Care Physician: Sandra Estevez MD  Principal Problem:COVID-19 virus infection    Patient information was obtained from patient, past medical records and ER records.     Inpatient consult to Cardiology  Consult performed by: MIAH Duke  Consult ordered by: Donald Rossi Jr., MD        Subjective:     Chief Complaint:  Shortness of breath, altered mental status     HPI:   PATIENT NOT SEEN AND EXAMINED DUE TO +COVID STATUS, INFORMATION OBTAINED FROM CHART REVIEW    Yan Choudhury is a 65 year old male who presented to [facility] due to altered mental status, shortness of breath and leg swelling. His current medical conditions include Chronic combined CHF, HFrEF, s/p CRT-ICD, DCM, LBBB, HTN, CKD, DM type II, PHTN, HLP, Obesity, CAD. ED workup revealed COVID 19+, BNP 1874, Troponin 0.139, H/H 12/40, K+ 3.7, Cr 4.1, na 131. He was placed in observation under the care of hospital medicine. Cardiology consulted to assist with medical management. Chart extensively reviewed. Previous ECHO report revealed EF 15%, Grade III DD. Would recommend IV diuresis and have low threshold for addition of IV dobutamine infusion if does not respond in 24 hours with IV diuresis and Metolazone.       Past Medical History:   Diagnosis Date    Arthritis     CAD (coronary artery disease)     Cataract     Chronic combined systolic and diastolic congestive heart failure 3/24/2015    CKD (chronic kidney disease), stage III     Diabetes mellitus type II      AM    Diabetic retinopathy     anti Veg F injections for macular edema    Diverticulitis of colon     ED (erectile dysfunction)     Encounter for blood transfusion     Glaucoma     HTN (hypertension)      Hyperlipidemia     Ischemic cardiomyopathy     Obesity     JAHAIRA on CPAP     Pacemaker     Pulmonary HTN        Past Surgical History:   Procedure Laterality Date    CARDIAC CATHETERIZATION  2009    CHOLECYSTECTOMY      COLON SURGERY      Sigmoid resection    COLONOSCOPY N/A 10/11/2018    Procedure: COLONOSCOPY;  Surgeon: Quinn Aragon MD;  Location: [facility] ENDO;  Service: General;  Laterality: N/A;    EYE SURGERY      FLEXIBLE SIGMOIDOSCOPY N/A 1/18/2019    Procedure: SIGMOIDOSCOPY, FLEXIBLE;  Surgeon: Quinn Aragon MD;  Location: [facility] ENDO;  Service: General;  Laterality: N/A;    ILEOSTOMY N/A 10/30/2018    Procedure: CREATION, ILEOSTOMY;  Surgeon: Kevin Lindsay MD;  Location: [facility] OR;  Service: General;  Laterality: N/A;    Ileostomy reversal  01/2019    KNEE SURGERY      MOBILIZATION OF SPLENIC FLEXURE N/A 10/30/2018    Procedure: MOBILIZATION, SPLENIC FLEXURE;  Surgeon: Kevin Lindsay MD;  Location: [facility] OR;  Service: General;  Laterality: N/A;    REVISION COLOSTOMY N/A 10/30/2018    Procedure: REVISION OR CLOSURE, COLOSTOMY;  Surgeon: Kevin Lindsay MD;  Location: [facility] OR;  Service: General;  Laterality: N/A;  Parastomal Hernia Repair    RIGHT HEMICOLECTOMY Right 10/30/2018    Procedure: HEMICOLECTOMY, RIGHT;  Surgeon: Kevin Lindsay MD;  Location: [facility] OR;  Service: General;  Laterality: Right;       Review of patient's allergies indicates:  No Known Allergies    No current facility-administered medications on file prior to encounter.      Current Outpatient Medications on File Prior to Encounter   Medication Sig    albuterol (PROVENTIL/VENTOLIN HFA) 90 mcg/actuation inhaler Inhale 2 puffs into the lungs every 6 (six) hours as needed for Wheezing. Rescue    allopurinoL (ZYLOPRIM) 100 MG tablet Take 1 tablet (100 mg total) by mouth once daily.    aspirin (ECOTRIN) 81 MG EC tablet Take 81 mg by mouth every morning.     BD INSULIN SYRINGE ULTRA-FINE 0.5 mL 31 gauge x 5/16  "Syrg USE AS DIRECTED TO INJECT INSULIN TWO TIMES DAILY    bimatoprost (LUMIGAN) 0.03 % ophthalmic drops Place 1 drop into the left eye every evening.    blood sugar diagnostic Strp 1 strip by Misc.(Non-Drug; Combo Route) route 4 (four) times daily. Telcare    brimonidine-timolol (COMBIGAN) 0.2-0.5 % Drop Place 1 drop into both eyes every 12 (twelve) hours.    bumetanide (BUMEX) 2 MG tablet TAKE 1 TABLET (2 MG TOTAL) BY MOUTH three times a day    carvediloL (COREG) 3.125 MG tablet Take 1 tablet (3.125 mg total) by mouth 2 (two) times daily with meals.    cholecalciferol, vitamin D3, (VITAMIN D3) 1,000 unit capsule Take 1,000 Units by mouth once daily.    dorzolamide-timolol 2-0.5% (COSOPT) 22.3-6.8 mg/mL ophthalmic solution Place 1 drop into both eyes 2 (two) times daily.    ENTRESTO 24-26 mg per tablet TAKE 1 TABLET BY MOUTH TWICE A DAY    hydrOXYzine HCL (ATARAX) 10 MG Tab Take 1 tablet (10 mg total) by mouth daily as needed.    insulin glargine (LANTUS U-100 INSULIN) 100 unit/mL injection INJECT 20 UNITS ONCE DAILY AS NEEDED WHEN BS< 150    IRON/VITAMIN B COMPLEX (GERITOL ORAL) Take by mouth.    latanoprost (XALATAN) 0.005 % ophthalmic solution Place 1 drop into both eyes once daily.    metOLazone (ZAROXOLYN) 5 MG tablet Take 1 tablet (5 mg total) by mouth once daily.    multivit-min/folic/vit K/lycop (MEN'S 50 PLUS MULTIVITAMIN ORAL) Take by mouth.    pantoprazole (PROTONIX) 40 MG tablet TAKE 1 TABLET BY MOUTH EVERY DAY FOR ACID REFLUX    simvastatin (ZOCOR) 10 MG tablet Take 1 tablet (10 mg total) by mouth every evening.    ketoconazole (NIZORAL) 2 % cream AAA bid prn rash of groin. For Flares.    lancets Misc For tel care    pen needle, diabetic (BD ULTRA-FINE JOSLYN PEN NEEDLE) 32 gauge x 5/32" Ndle 1 each by Misc.(Non-Drug; Combo Route) route 4 (four) times daily.     Family History     Problem Relation (Age of Onset)    Cancer Mother    Heart disease Father    No Known Problems Sister, " Brother, Maternal Aunt, Maternal Uncle, Paternal Aunt, Paternal Uncle, Maternal Grandmother, Maternal Grandfather, Paternal Grandmother, Paternal Grandfather    Stroke Father        Tobacco Use    Smoking status: Never Smoker    Smokeless tobacco: Never Used   Substance and Sexual Activity    Alcohol use: No     Alcohol/week: 0.0 standard drinks     Frequency: Never    Drug use: No    Sexual activity: Not Currently     Review of Systems   Constitution: Positive for malaise/fatigue.   Cardiovascular: Positive for dyspnea on exertion, leg swelling and orthopnea.   Respiratory: Positive for cough and shortness of breath.    Psychiatric/Behavioral: Positive for altered mental status.     Objective:     Vital Signs (Most Recent):  Temp: 97 °F (36.1 °C) (06/17/20 0812)  Pulse: 81 (06/17/20 1047)  Resp: 20 (06/17/20 1047)  BP: 104/72 (06/17/20 1031)  SpO2: 100 % (06/17/20 1047) Vital Signs (24h Range):  Temp:  [97 °F (36.1 °C)] 97 °F (36.1 °C)  Pulse:  [74-88] 81  Resp:  [20-27] 20  SpO2:  [95 %-100 %] 100 %  BP: ()/(56-72) 104/72     Weight: 84.9 kg (187 lb 1.6 oz)  Body mass index is 28.45 kg/m².    SpO2: 100 %  O2 Device (Oxygen Therapy): room air    No intake or output data in the 24 hours ending 06/17/20 1048  NO physical exam due to +covid 19 status  Lines/Drains/Airways     Peripheral Intravenous Line                 Peripheral IV - Single Lumen 06/17/20 0830 18 G Right Antecubital less than 1 day                Physical Exam    Significant Labs:   BMP:   Recent Labs   Lab 06/17/20  0830   *   *   K 3.7   CL 99   CO2 22*   BUN 62*   CREATININE 4.1*   CALCIUM 8.1*   , CBC   Recent Labs   Lab 06/17/20  0830   WBC 3.13*   HGB 12.6*   HCT 40.5   *   , Troponin   Recent Labs   Lab 06/17/20  0830   TROPONINI 0.139*    and All pertinent lab results from the last 24 hours have been reviewed.    Significant Imaging: Echocardiogram:   Transthoracic echo (TTE) complete (Cupid Only):   Results for  orders placed or performed during the hospital encounter of 05/28/20   Echo Color Flow Doppler? Yes   Result Value Ref Range    Ascending aorta 3.50 cm    STJ 3.56 cm    AV mean gradient 2 mmHg    Ao peak luis f 0.88 m/s    Ao VTI 16.61 cm    IVRT 78.43 msec    IVS 1.11 (A) 0.6 - 1.1 cm    LA size 4.16 cm    Left Atrium Major Axis 5.28 cm    Left Atrium Minor Axis 5.18 cm    LVIDD 5.55 3.5 - 6.0 cm    LVIDS 5.30 (A) 2.1 - 4.0 cm    LVOT diameter 2.01 cm    LVOT peak VTI 16.95 cm    PW 1.02 0.6 - 1.1 cm    MV Peak A Luis F 0.30 m/s    E wave decelartion time 141.72 msec    MV Peak E Luis F 0.79 m/s    RA Major Axis 4.80 cm    RVDD 3.62 cm    Sinus 3.66 cm    TAPSE 1.21 cm    TR Max Luis F 3.46 m/s    Ao root annulus 3.63 cm    LV Diastolic Volume 150.25 mL    LV Systolic Volume 135.34 mL    LVOT peak luis f 0.69 m/s    LA WIDTH 3.58 cm    RA Width 4.13 cm    FS 5 %    LA volume 66.20 cm3    LV mass 235.25 g    Left Ventricle Relative Wall Thickness 0.37 cm    AV valve area 3.24 cm2    AV Velocity Ratio 0.78     AV index (prosthetic) 1.02     E/A ratio 2.63     LVOT area 3.2 cm2    LVOT stroke volume 53.76 cm3    AV peak gradient 3 mmHg    LV Systolic Volume Index 62.2 mL/m2    LV Diastolic Volume Index 69.02 mL/m2    LA Volume Index 30.4 mL/m2    LV Mass Index 108 g/m2    Triscuspid Valve Regurgitation Peak Gradient 48 mmHg    BSA 2.25 m2    Right Atrial Pressure (from IVC) 15 mmHg    TV rest pulmonary artery pressure 63 mmHg    Narrative    · Mild left ventricular enlargement.  · Left ventricular systolic function. The estimated ejection fraction is   15%.  · Grade III (severe) left ventricular diastolic dysfunction consistent   with restrictive physiology.  · Severe global hypokinetic wall motion.  · Moderate right ventricular enlargement.  · Moderately reduced right ventricular systolic function.  · Moderate to severe tricuspid regurgitation.  · Mild to moderate pulmonic regurgitation.  · Mild aortic regurgitation.  · Elevated  central venous pressure (15 mmHg).  · The estimated PA systolic pressure is 63 mmHg.  · Pulmonary hypertension present.       and X-Ray: CXR: X-Ray Chest 1 View (CXR): No results found for this visit on 06/17/20. and X-Ray Chest PA and Lateral (CXR): No results found for this visit on 06/17/20.    Assessment and Plan:     * COVID-19 virus infection  Mgmt per primary team    CKD (chronic kidney disease) stage 4, GFR 15-29 ml/min  Recommend nephrology consult given worsening renal function on labs today      Acute on chronic combined systolic and diastolic congestive heart failure  -BNP 1874  -Recommend IV Bumex 2 mg BID  -Low threshold for Dobutamine infusion failure to respond in 24 hours  -Recommend nephrology consult  -Continue home meds as tolerated  -Daily weights with standing scale  -Strict I/Os  -Assess response    Pulmonary HTN  Continue IV diuresis    Hypertension associated with diabetes  Continue home meds        VTE Risk Mitigation (From admission, onward)    None          Thank you for your consult. I will follow-up with patient. Please contact us if you have any additional questions.    MANPREET Street-C  Cardiology   [facility]

## 2020-06-17 NOTE — ASSESSMENT & PLAN NOTE
Serum creatinine 4.1 (baseline 2.7)  Avoid nephrotoxic meds  Will hold Bumex and Metolazone for now as pt appears volume depleted  Gentle IV hydration  Hold ACE/ARB combination drug

## 2020-06-17 NOTE — ASSESSMENT & PLAN NOTE
Borderline low blood pressure  Hold antihypertensives for now  Gentle IV hydration - spoke with Cardiology, Dr. Pantoja, who recommended gentle IV hydration

## 2020-06-17 NOTE — ASSESSMENT & PLAN NOTE
-BNP 1874  -Recommend IV Bumex 2 mg BID  -Low threshold for Dobutamine infusion failure to respond in 24 hours  -Recommend nephrology consult  -Continue home meds as tolerated  -Daily weights with standing scale  -Strict I/Os  -Assess response

## 2020-06-17 NOTE — HPI
PATIENT NOT SEEN AND EXAMINED DUE TO +COVID STATUS, INFORMATION OBTAINED FROM CHART REVIEW    Yan Choudhury is a 65 year old male who presented to [facility] due to altered mental status, shortness of breath and leg swelling. His current medical conditions include Chronic combined CHF, HFrEF, s/p CRT-ICD, DCM, LBBB, HTN, CKD, DM type II, PHTN, HLP, Obesity, CAD. ED workup revealed COVID 19+, BNP 1874, Troponin 0.139, H/H 12/40, K+ 3.7, Cr 4.1, na 131. He was placed in observation under the care of [facility] medicine. Cardiology consulted to assist with medical management. Chart extensively reviewed. Previous ECHO report revealed EF 15%, Grade III DD. Would recommend IV diuresis and have low threshold for addition of IV dobutamine infusion if does not respond in 24 hours with IV diuresis and Metolazone.

## 2020-06-17 NOTE — PLAN OF CARE
Treatment ongoing.  Pt opens eyes spontaneously but falls back to sleep b/t care; disoriented to time.  VSS, v-paced on tele monitor.  Pt remained afebrile.  O2 sats wnl on 1-2L NC.  IVF infusing per orders.  Pt had no c/o pain.  Pt free from falls; safety precautions in place.  Frequent weight shifting encouraged.  Plan of care reviewed with pt; he verbalized understanding.  Pt currently resting comfortably in bed.  Hourly rounding complete.  Will continue to monitor.

## 2020-06-17 NOTE — SUBJECTIVE & OBJECTIVE
Past Medical History:   Diagnosis Date    Arthritis     CAD (coronary artery disease)     Cataract     Chronic combined systolic and diastolic congestive heart failure 3/24/2015    CKD (chronic kidney disease), stage III     Diabetes mellitus type II      AM    Diabetic retinopathy     anti Veg F injections for macular edema    Diverticulitis of colon     ED (erectile dysfunction)     Encounter for blood transfusion     Glaucoma     HTN (hypertension)     Hyperlipidemia     Ischemic cardiomyopathy     Obesity     JAHAIRA on CPAP     Pacemaker     Pulmonary HTN        Past Surgical History:   Procedure Laterality Date    CARDIAC CATHETERIZATION  2009    CHOLECYSTECTOMY      COLON SURGERY      Sigmoid resection    COLONOSCOPY N/A 10/11/2018    Procedure: COLONOSCOPY;  Surgeon: Quinn Aragon MD;  Location: [facility] ENDO;  Service: General;  Laterality: N/A;    EYE SURGERY      FLEXIBLE SIGMOIDOSCOPY N/A 1/18/2019    Procedure: SIGMOIDOSCOPY, FLEXIBLE;  Surgeon: Quinn Aragon MD;  Location: [facility] ENDO;  Service: General;  Laterality: N/A;    ILEOSTOMY N/A 10/30/2018    Procedure: CREATION, ILEOSTOMY;  Surgeon: Kevin Lindsay MD;  Location: [facility] OR;  Service: General;  Laterality: N/A;    Ileostomy reversal  01/2019    KNEE SURGERY      MOBILIZATION OF SPLENIC FLEXURE N/A 10/30/2018    Procedure: MOBILIZATION, SPLENIC FLEXURE;  Surgeon: Kevin Lindsay MD;  Location: [facility] OR;  Service: General;  Laterality: N/A;    REVISION COLOSTOMY N/A 10/30/2018    Procedure: REVISION OR CLOSURE, COLOSTOMY;  Surgeon: Kevin Lindsay MD;  Location: [facility] OR;  Service: General;  Laterality: N/A;  Parastomal Hernia Repair    RIGHT HEMICOLECTOMY Right 10/30/2018    Procedure: HEMICOLECTOMY, RIGHT;  Surgeon: Kevin Lindsay MD;  Location: [facility] OR;  Service: General;  Laterality: Right;       Review of patient's allergies indicates:  No Known Allergies    No current facility-administered  medications on file prior to encounter.      Current Outpatient Medications on File Prior to Encounter   Medication Sig    albuterol (PROVENTIL/VENTOLIN HFA) 90 mcg/actuation inhaler Inhale 2 puffs into the lungs every 6 (six) hours as needed for Wheezing. Rescue    allopurinoL (ZYLOPRIM) 100 MG tablet Take 1 tablet (100 mg total) by mouth once daily.    aspirin (ECOTRIN) 81 MG EC tablet Take 81 mg by mouth every morning.     BD INSULIN SYRINGE ULTRA-FINE 0.5 mL 31 gauge x 5/16 Syrg USE AS DIRECTED TO INJECT INSULIN TWO TIMES DAILY    bimatoprost (LUMIGAN) 0.03 % ophthalmic drops Place 1 drop into the left eye every evening.    blood sugar diagnostic Strp 1 strip by Misc.(Non-Drug; Combo Route) route 4 (four) times daily. [employer]care    brimonidine-timolol (COMBIGAN) 0.2-0.5 % Drop Place 1 drop into both eyes every 12 (twelve) hours.    bumetanide (BUMEX) 2 MG tablet TAKE 1 TABLET (2 MG TOTAL) BY MOUTH three times a day    carvediloL (COREG) 3.125 MG tablet Take 1 tablet (3.125 mg total) by mouth 2 (two) times daily with meals.    cholecalciferol, vitamin D3, (VITAMIN D3) 1,000 unit capsule Take 1,000 Units by mouth once daily.    dorzolamide-timolol 2-0.5% (COSOPT) 22.3-6.8 mg/mL ophthalmic solution Place 1 drop into both eyes 2 (two) times daily.    ENTRESTO 24-26 mg per tablet TAKE 1 TABLET BY MOUTH TWICE A DAY    hydrOXYzine HCL (ATARAX) 10 MG Tab Take 1 tablet (10 mg total) by mouth daily as needed.    insulin glargine (LANTUS U-100 INSULIN) 100 unit/mL injection INJECT 20 UNITS ONCE DAILY AS NEEDED WHEN BS< 150    IRON/VITAMIN B COMPLEX (GERITOL ORAL) Take by mouth.    latanoprost (XALATAN) 0.005 % ophthalmic solution Place 1 drop into both eyes once daily.    metOLazone (ZAROXOLYN) 5 MG tablet Take 1 tablet (5 mg total) by mouth once daily.    multivit-min/folic/vit K/lycop (MEN'S 50 PLUS MULTIVITAMIN ORAL) Take by mouth.    pantoprazole (PROTONIX) 40 MG tablet TAKE 1 TABLET BY MOUTH EVERY DAY  "FOR ACID REFLUX    simvastatin (ZOCOR) 10 MG tablet Take 1 tablet (10 mg total) by mouth every evening.    ketoconazole (NIZORAL) 2 % cream AAA bid prn rash of groin. For Flares.    lancets Misc For tel care    pen needle, diabetic (BD ULTRA-FINE JOSLYN PEN NEEDLE) 32 gauge x 5/32" Ndle 1 each by Misc.(Non-Drug; Combo Route) route 4 (four) times daily.     Family History     Problem Relation (Age of Onset)    Cancer Mother    Heart disease Father    No Known Problems Sister, Brother, Maternal Aunt, Maternal Uncle, Paternal Aunt, Paternal Uncle, Maternal Grandmother, Maternal Grandfather, Paternal Grandmother, Paternal Grandfather    Stroke Father        Tobacco Use    Smoking status: Never Smoker    Smokeless tobacco: Never Used   Substance and Sexual Activity    Alcohol use: No     Alcohol/week: 0.0 standard drinks     Frequency: Never    Drug use: No    Sexual activity: Not Currently     Review of Systems   Constitutional: Positive for activity change, appetite change, chills and fatigue. Negative for fever.   HENT: Negative.    Respiratory: Positive for shortness of breath. Negative for cough and wheezing.    Cardiovascular: Negative for chest pain, palpitations and leg swelling.   Gastrointestinal: Negative for abdominal pain, diarrhea, nausea and vomiting.   Genitourinary: Negative.    Musculoskeletal: Negative.    Skin: Negative.    Neurological: Positive for weakness.   Psychiatric/Behavioral: Negative for confusion.     Objective:     Vital Signs (Most Recent):  Temp: 97 °F (36.1 °C) (06/17/20 0812)  Pulse: 70 (06/17/20 1201)  Resp: 15 (06/17/20 1201)  BP: (!) 82/62 (06/17/20 1201)  SpO2: 100 % (06/17/20 1201) Vital Signs (24h Range):  Temp:  [97 °F (36.1 °C)] 97 °F (36.1 °C)  Pulse:  [70-88] 70  Resp:  [15-28] 15  SpO2:  [95 %-100 %] 100 %  BP: ()/(53-72) 82/62     Weight: 84.9 kg (187 lb 1.6 oz)  Body mass index is 28.45 kg/m².    Physical Exam  Vitals signs and nursing note reviewed. "   Constitutional:       General: He is not in acute distress.     Appearance: He is well-developed. He is not ill-appearing.   HENT:      Head: Normocephalic and atraumatic.      Nose: Nose normal.   Eyes:      General: No scleral icterus.     Conjunctiva/sclera: Conjunctivae normal.      Pupils: Pupils are equal, round, and reactive to light.   Neck:      Musculoskeletal: Normal range of motion and neck supple.   Cardiovascular:      Rate and Rhythm: Normal rate and regular rhythm.      Heart sounds: Normal heart sounds. No murmur. No friction rub. No gallop.    Pulmonary:      Effort: Tachypnea present.      Breath sounds: Decreased breath sounds present.   Abdominal:      General: Bowel sounds are normal.      Palpations: Abdomen is soft.   Musculoskeletal: Normal range of motion.         General: No tenderness.   Skin:     General: Skin is warm and dry.   Neurological:      Mental Status: He is alert and oriented to person, place, and time.   Psychiatric:         Behavior: Behavior normal.           CRANIAL NERVES     CN III, IV, VI   Pupils are equal, round, and reactive to light.       Significant Labs:   CBC:   Recent Labs   Lab 06/17/20  0830   WBC 3.13*   HGB 12.6*   HCT 40.5   *     CMP:   Recent Labs   Lab 06/17/20  0830   *   K 3.7   CL 99   CO2 22*   *   BUN 62*   CREATININE 4.1*   CALCIUM 8.1*   PROT 6.6   ALBUMIN 2.5*   BILITOT 1.0   ALKPHOS 87   AST 59*   ALT 25   ANIONGAP 10   EGFRNONAA 14*     All pertinent labs within the past 24 hours have been reviewed.    Significant Imaging: I have reviewed all pertinent imaging results/findings within the past 24 hours.

## 2020-06-17 NOTE — H&P
"[facility] Hospital Medicine  History & Physical    Patient Name: Yan Choudhury  MRN: [id number]  Admission Date: 6/17/2020  Attending Physician: Orlando Simpson MD   Primary Care Provider: Sandra Estevez MD         Patient information was obtained from patient, spouse/SO, relative(s), past medical records and ER records.     Subjective:     Principal Problem:COVID-19 virus infection    Chief Complaint:   Chief Complaint   Patient presents with    Congestive Heart Failure     Provider sent message stating "If he is having worsening SOB or edema, he needs to be admitted for IV diuretics and even needs to be considered for IV Dobutamine given his low LVF of 15%"    Altered Mental Status     confused, unable to state why he is here or what is going on with him today, disoriented to time and situation        HPI: Pt is a 64 yo male with PMHx of combined CHF with EF 15 %, CKD III, DM II and ischemic cardiomyopathy who presents to the ED due to weakness. Wife provides history as patient is a difficult historian. Wife explains pt has had a decreased appetite with increasing weakness onset for approx 6 days. Wife says that Cardiology mentioned possible dehydration. Wife says that pt has not been eating or drinking regularly with increased weakness, walking slow and increased fatigue. While in the ED, nurse states that pt was tachypnic with respiratory rate 26.   On arrival: Temp 97, pulse 74, resp 22, B/P 99/56 and pulse ox 95 on RA.   CXR - Slight increase in the the vascular markings suggested, a little greater at the right base.  Mild congestion?  No overt failure.    BNP = 1874 (last admission 2300)  Labs find WBC 3.13, Na 131, serum creatinine 4.1 (baseline 2.7), glucose 119  PT IS COVID +  Pt has received a saline bolus 250 cc in the ED. Afterwards, blood pressure noted 82/62 and a second saline bolus of 250 cc given.       Past Medical History:   Diagnosis Date    Arthritis     CAD (coronary artery " disease)     Cataract     Chronic combined systolic and diastolic congestive heart failure 3/24/2015    CKD (chronic kidney disease), stage III     Diabetes mellitus type II      AM    Diabetic retinopathy     anti Veg F injections for macular edema    Diverticulitis of colon     ED (erectile dysfunction)     Encounter for blood transfusion     Glaucoma     HTN (hypertension)     Hyperlipidemia     Ischemic cardiomyopathy     Obesity     JAHAIRA on CPAP     Pacemaker     Pulmonary HTN        Past Surgical History:   Procedure Laterality Date    CARDIAC CATHETERIZATION  2009    CHOLECYSTECTOMY      COLON SURGERY      Sigmoid resection    COLONOSCOPY N/A 10/11/2018    Procedure: COLONOSCOPY;  Surgeon: Quinn Aragon MD;  Location: [facility] ENDO;  Service: General;  Laterality: N/A;    EYE SURGERY      FLEXIBLE SIGMOIDOSCOPY N/A 1/18/2019    Procedure: SIGMOIDOSCOPY, FLEXIBLE;  Surgeon: Quinn Aragon MD;  Location: [facility] ENDO;  Service: General;  Laterality: N/A;    ILEOSTOMY N/A 10/30/2018    Procedure: CREATION, ILEOSTOMY;  Surgeon: Kevin Lindsay MD;  Location: [facility] OR;  Service: General;  Laterality: N/A;    Ileostomy reversal  01/2019    KNEE SURGERY      MOBILIZATION OF SPLENIC FLEXURE N/A 10/30/2018    Procedure: MOBILIZATION, SPLENIC FLEXURE;  Surgeon: Kevin Lindsay MD;  Location: [facility] OR;  Service: General;  Laterality: N/A;    REVISION COLOSTOMY N/A 10/30/2018    Procedure: REVISION OR CLOSURE, COLOSTOMY;  Surgeon: Kevin Lindsay MD;  Location: [facility] OR;  Service: General;  Laterality: N/A;  Parastomal Hernia Repair    RIGHT HEMICOLECTOMY Right 10/30/2018    Procedure: HEMICOLECTOMY, RIGHT;  Surgeon: Kevin Lindsay MD;  Location: [facility] OR;  Service: General;  Laterality: Right;       Review of patient's allergies indicates:  No Known Allergies    No current facility-administered medications on file prior to encounter.      Current Outpatient Medications on File  Prior to Encounter   Medication Sig    albuterol (PROVENTIL/VENTOLIN HFA) 90 mcg/actuation inhaler Inhale 2 puffs into the lungs every 6 (six) hours as needed for Wheezing. Rescue    allopurinoL (ZYLOPRIM) 100 MG tablet Take 1 tablet (100 mg total) by mouth once daily.    aspirin (ECOTRIN) 81 MG EC tablet Take 81 mg by mouth every morning.     BD INSULIN SYRINGE ULTRA-FINE 0.5 mL 31 gauge x 5/16 Syrg USE AS DIRECTED TO INJECT INSULIN TWO TIMES DAILY    bimatoprost (LUMIGAN) 0.03 % ophthalmic drops Place 1 drop into the left eye every evening.    blood sugar diagnostic Strp 1 strip by Misc.(Non-Drug; Combo Route) route 4 (four) times daily. [employer]    brimonidine-timolol (COMBIGAN) 0.2-0.5 % Drop Place 1 drop into both eyes every 12 (twelve) hours.    bumetanide (BUMEX) 2 MG tablet TAKE 1 TABLET (2 MG TOTAL) BY MOUTH three times a day    carvediloL (COREG) 3.125 MG tablet Take 1 tablet (3.125 mg total) by mouth 2 (two) times daily with meals.    cholecalciferol, vitamin D3, (VITAMIN D3) 1,000 unit capsule Take 1,000 Units by mouth once daily.    dorzolamide-timolol 2-0.5% (COSOPT) 22.3-6.8 mg/mL ophthalmic solution Place 1 drop into both eyes 2 (two) times daily.    ENTRESTO 24-26 mg per tablet TAKE 1 TABLET BY MOUTH TWICE A DAY    hydrOXYzine HCL (ATARAX) 10 MG Tab Take 1 tablet (10 mg total) by mouth daily as needed.    insulin glargine (LANTUS U-100 INSULIN) 100 unit/mL injection INJECT 20 UNITS ONCE DAILY AS NEEDED WHEN BS< 150    IRON/VITAMIN B COMPLEX (GERITOL ORAL) Take by mouth.    latanoprost (XALATAN) 0.005 % ophthalmic solution Place 1 drop into both eyes once daily.    metOLazone (ZAROXOLYN) 5 MG tablet Take 1 tablet (5 mg total) by mouth once daily.    multivit-min/folic/vit K/lycop (MEN'S 50 PLUS MULTIVITAMIN ORAL) Take by mouth.    pantoprazole (PROTONIX) 40 MG tablet TAKE 1 TABLET BY MOUTH EVERY DAY FOR ACID REFLUX    simvastatin (ZOCOR) 10 MG tablet Take 1 tablet (10 mg total) by  "mouth every evening.    ketoconazole (NIZORAL) 2 % cream AAA bid prn rash of groin. For Flares.    lancets Misc For tel care    pen needle, diabetic (BD ULTRA-FINE JOSLYN PEN NEEDLE) 32 gauge x 5/32" Ndle 1 each by Misc.(Non-Drug; Combo Route) route 4 (four) times daily.     Family History     Problem Relation (Age of Onset)    Cancer Mother    Heart disease Father    No Known Problems Sister, Brother, Maternal Aunt, Maternal Uncle, Paternal Aunt, Paternal Uncle, Maternal Grandmother, Maternal Grandfather, Paternal Grandmother, Paternal Grandfather    Stroke Father        Tobacco Use    Smoking status: Never Smoker    Smokeless tobacco: Never Used   Substance and Sexual Activity    Alcohol use: No     Alcohol/week: 0.0 standard drinks     Frequency: Never    Drug use: No    Sexual activity: Not Currently     Review of Systems   Constitutional: Positive for activity change, appetite change, chills and fatigue. Negative for fever.   HENT: Negative.    Respiratory: Positive for shortness of breath. Negative for cough and wheezing.    Cardiovascular: Negative for chest pain, palpitations and leg swelling.   Gastrointestinal: Negative for abdominal pain, diarrhea, nausea and vomiting.   Genitourinary: Negative.    Musculoskeletal: Negative.    Skin: Negative.    Neurological: Positive for weakness.   Psychiatric/Behavioral: Negative for confusion.     Objective:     Vital Signs (Most Recent):  Temp: 97 °F (36.1 °C) (06/17/20 0812)  Pulse: 70 (06/17/20 1201)  Resp: 15 (06/17/20 1201)  BP: (!) 82/62 (06/17/20 1201)  SpO2: 100 % (06/17/20 1201) Vital Signs (24h Range):  Temp:  [97 °F (36.1 °C)] 97 °F (36.1 °C)  Pulse:  [70-88] 70  Resp:  [15-28] 15  SpO2:  [95 %-100 %] 100 %  BP: ()/(53-72) 82/62     Weight: 84.9 kg (187 lb 1.6 oz)  Body mass index is 28.45 kg/m².    Physical Exam  Vitals signs and nursing note reviewed.   Constitutional:       General: He is not in acute distress.     Appearance: He is " well-developed. He is not ill-appearing.   HENT:      Head: Normocephalic and atraumatic.      Nose: Nose normal.   Eyes:      General: No scleral icterus.     Conjunctiva/sclera: Conjunctivae normal.      Pupils: Pupils are equal, round, and reactive to light.   Neck:      Musculoskeletal: Normal range of motion and neck supple.   Cardiovascular:      Rate and Rhythm: Normal rate and regular rhythm.      Heart sounds: Normal heart sounds. No murmur. No friction rub. No gallop.    Pulmonary:      Effort: Tachypnea present.      Breath sounds: Decreased breath sounds present.   Abdominal:      General: Bowel sounds are normal.      Palpations: Abdomen is soft.   Musculoskeletal: Normal range of motion.         General: No tenderness.   Skin:     General: Skin is warm and dry.   Neurological:      Mental Status: He is alert and oriented to person, place, and time.   Psychiatric:         Behavior: Behavior normal.           CRANIAL NERVES     CN III, IV, VI   Pupils are equal, round, and reactive to light.       Significant Labs:   CBC:   Recent Labs   Lab 06/17/20  0830   WBC 3.13*   HGB 12.6*   HCT 40.5   *     CMP:   Recent Labs   Lab 06/17/20  0830   *   K 3.7   CL 99   CO2 22*   *   BUN 62*   CREATININE 4.1*   CALCIUM 8.1*   PROT 6.6   ALBUMIN 2.5*   BILITOT 1.0   ALKPHOS 87   AST 59*   ALT 25   ANIONGAP 10   EGFRNONAA 14*     All pertinent labs within the past 24 hours have been reviewed.    Significant Imaging: I have reviewed all pertinent imaging results/findings within the past 24 hours.    Assessment/Plan:     * COVID-19 virus infection  - COVID-19 testing - positive result  - Infection Control notified     - Isolation:   - Airborne, Contact and Droplet Precautions  - Cohort patients into COVID units  - N95 masks must be fit tested, wear eye protection  - 20 second hand hygiene              - Limit visitors per hospital policy              - Consolidating lab draws, nursing care, provider  visits, and interventions    - Diagnostics: (leukopenia, hyponatremia, hyperferritinemia, elevated troponin, elevated d-dimer, age, and comorbidities are significant predictors of poor clinical outcome)  CBC, CMP, Ferritin, CRP, LDH, Blood Culture x2 and Portable CXR    - Management:  Supplemental O2 to maintain SpO2 >92%  Telemetry  Continuous/intermittent Pulse Ox  Albuterol treatment PRN  Doxycycline for infiltrate  Vitamin C, multi vitamin  Monitor labs every 48 hours  Advance Care Planning     Code Status - FULL CODE according to patient's wife                Acute kidney injury superimposed on CKD  Serum creatinine 4.1 (baseline 2.7)  Avoid nephrotoxic meds  Will hold Bumex and Metolazone for now as pt appears volume depleted  Gentle IV hydration  Hold ACE/ARB combination drug      Sepsis  Criteria:  RR rate > 20, WBC 3.13  Lactic acid normal  B/P appropriate on arrival but did decline to 82/62 as pt is volume depleted  Blood cultures drawn  Normal saline 500 cc bolus given  Doxycyline given        CKD (chronic kidney disease) stage 4, GFR 15-29 ml/min  Above baseline  Appears volume overload  Hold ACE/ARB  Gentle IV hydration      Chronic combined systolic and diastolic congestive heart failure  EF 15 % and grade II diastolic dysfunction  Compensated currently  Pt appears volume depleted likely from poor appetite secondary to viral illness  Will hold ACE/ARB, Bumex and metolazone  Gentle IV hydration  Monitor closely for S/S of volume overload      Hypertension associated with diabetes  Borderline low blood pressure  Hold antihypertensives for now  Gentle IV hydration - spoke with Cardiology, Dr. Pantoja, who recommended gentle IV hydration        VTE Risk Mitigation (From admission, onward)         Ordered     IP VTE HIGH RISK PATIENT  Once      06/17/20 1147     Place sequential compression device  Until discontinued      06/17/20 1147                   Cheyenne Hernandez NP  Department of Hospital Medicine    [facility]

## 2020-06-17 NOTE — ASSESSMENT & PLAN NOTE
EF 15 % and grade II diastolic dysfunction  Compensated currently  Pt appears volume depleted likely from poor appetite secondary to viral illness  Will hold ACE/ARB, Bumex and metolazone  Gentle IV hydration  Monitor closely for S/S of volume overload

## 2020-06-17 NOTE — ED PROVIDER NOTES
"SCRIBE #1 NOTE: I, Maya Gabriel, am scribing for, and in the presence of, Donald Rossi Jr., MD. I have scribed the entire note.      History      Chief Complaint   Patient presents with    Congestive Heart Failure     Provider sent message stating "If he is having worsening SOB or edema, he needs to be admitted for IV diuretics and even needs to be considered for IV Dobutamine given his low LVF of 15%"    Altered Mental Status     confused, unable to state why he is here or what is going on with him today, disoriented to time and situation       Review of patient's allergies indicates:  No Known Allergies     HPI   HPI    6/17/2020, 8:20 AM   History obtained from the patient      History of Present Illness: Yan Choudhury is a 65 y.o. male patient with a PMHx CAD, CKD, HTN, Pacemaker, Obesity who presents to the Emergency Department for evaluation of CHF referred to by cardiology. Nurse documents AMS, pt was evaluated and is answering questions appropriatly and appears to be at baseline at this time. Symptoms are constant and moderate in severity. No mitigating or exacerbating factors reported. Associated sxs include generalized weakness, SOB, and nonproductive cough. Patient denies any fever, chills, n/v/d, CP, rhinorrhea, HA, sore throat, and all other sxs at this time. No prior Tx reported. No further complaints or concerns at this time.         Arrival mode: Personal vehicle     PCP: Sandra Estevez MD       Past Medical History:  Past Medical History:   Diagnosis Date    Arthritis     CAD (coronary artery disease)     Cataract     Chronic combined systolic and diastolic congestive heart failure 3/24/2015    CKD (chronic kidney disease), stage III     Diabetes mellitus type II      AM    Diabetic retinopathy     anti Veg F injections for macular edema    Diverticulitis of colon     ED (erectile dysfunction)     Encounter for blood transfusion     Glaucoma     HTN (hypertension)     " Hyperlipidemia     Ischemic cardiomyopathy     Obesity     JAHAIRA on CPAP     Pacemaker     Pulmonary HTN        Past Surgical History:  Past Surgical History:   Procedure Laterality Date    CARDIAC CATHETERIZATION  2009    CHOLECYSTECTOMY      COLON SURGERY      Sigmoid resection    COLONOSCOPY N/A 10/11/2018    Procedure: COLONOSCOPY;  Surgeon: Quinn Aragon MD;  Location: [facility] ENDO;  Service: General;  Laterality: N/A;    EYE SURGERY      FLEXIBLE SIGMOIDOSCOPY N/A 1/18/2019    Procedure: SIGMOIDOSCOPY, FLEXIBLE;  Surgeon: Quinn Aragon MD;  Location: [facility] ENDO;  Service: General;  Laterality: N/A;    ILEOSTOMY N/A 10/30/2018    Procedure: CREATION, ILEOSTOMY;  Surgeon: Kevin Lindsay MD;  Location: [facility] OR;  Service: General;  Laterality: N/A;    Ileostomy reversal  01/2019    KNEE SURGERY      MOBILIZATION OF SPLENIC FLEXURE N/A 10/30/2018    Procedure: MOBILIZATION, SPLENIC FLEXURE;  Surgeon: Kevin Lindsay MD;  Location: [facility] OR;  Service: General;  Laterality: N/A;    REVISION COLOSTOMY N/A 10/30/2018    Procedure: REVISION OR CLOSURE, COLOSTOMY;  Surgeon: Kevin Lindsay MD;  Location: [facility] OR;  Service: General;  Laterality: N/A;  Parastomal Hernia Repair    RIGHT HEMICOLECTOMY Right 10/30/2018    Procedure: HEMICOLECTOMY, RIGHT;  Surgeon: Kevin Lindsay MD;  Location: [facility] OR;  Service: General;  Laterality: Right;         Family History:  Family History   Problem Relation Age of Onset    Cancer Mother     Heart disease Father     Stroke Father     No Known Problems Sister     No Known Problems Brother     No Known Problems Maternal Aunt     No Known Problems Maternal Uncle     No Known Problems Paternal Aunt     No Known Problems Paternal Uncle     No Known Problems Maternal Grandmother     No Known Problems Maternal Grandfather     No Known Problems Paternal Grandmother     No Known Problems Paternal Grandfather     Amblyopia Neg Hx     Blindness Neg Hx      Cataracts Neg Hx     Diabetes Neg Hx     Glaucoma Neg Hx     Hypertension Neg Hx     Macular degeneration Neg Hx     Retinal detachment Neg Hx     Strabismus Neg Hx     Thyroid disease Neg Hx        Social History:  Social History     Tobacco Use    Smoking status: Never Smoker    Smokeless tobacco: Never Used   Substance and Sexual Activity    Alcohol use: No     Alcohol/week: 0.0 standard drinks     Frequency: Never    Drug use: No    Sexual activity: Not Currently       ROS   Review of Systems   Constitutional: Negative for chills and fever.   HENT: Negative for rhinorrhea and sore throat.    Respiratory: Positive for cough (non productive) and shortness of breath.    Cardiovascular: Negative for chest pain.   Gastrointestinal: Negative for diarrhea, nausea and vomiting.   Genitourinary: Negative for dysuria.   Musculoskeletal: Negative for back pain.   Skin: Negative for rash.   Neurological: Positive for weakness (gernalized). Negative for headaches.   Hematological: Does not bruise/bleed easily.   All other systems reviewed and are negative.    Physical Exam      Initial Vitals [06/17/20 0812]   BP Pulse Resp Temp SpO2   (!) 99/56 74 (!) 22 97 °F (36.1 °C) 95 %      MAP       --          Physical Exam  Nursing Notes and Vital Signs Reviewed.   Constitutional: Patient is in no acute distress. Well-developed and well-nourished.  Patient is at baseline mental status.  He answers questions appropriately and does not appear to be in any distress.  Head: Atraumatic. Normocephalic.  Eyes: PERRL. EOM intact. Conjunctivae are not pale. No scleral icterus.  ENT: Mucous membranes are moist. Oropharynx is clear and symmetric.    Neck: Supple. Full ROM. No lymphadenopathy.  Cardiovascular: Regular rate. Regular rhythm. No murmurs, rubs, or gallops. Distal pulses are 2+ and symmetric.  I do not appreciate any pedal edema.  Pulmonary/Chest: No respiratory distress. Clear to auscultation bilaterally. No  wheezing or rales.  Abdominal: Soft and non-distended.  There is no tenderness.  No rebound, guarding, or rigidity. Good bowel sounds.  Genitourinary: No CVA tenderness  Musculoskeletal: Moves all extremities. No obvious deformities. No edema. No calf tenderness.  Skin: Warm and dry.  Neurological:  Alert, awake, and appropriate.  Normal speech.  No acute focal neurological deficits are appreciated.  Psychiatric: Normal affect. Good eye contact. Appropriate in content.    ED Course    Critical Care    Date/Time: 6/17/2020 10:06 AM  Performed by: Donald Rossi Jr., MD  Authorized by: Donald Rossi Jr., MD   Direct patient critical care time: 9 minutes  Additional history critical care time: 9 minutes  Ordering / reviewing critical care time: 6 minutes  Documentation critical care time: 8 minutes  Total critical care time (exclusive of procedural time) : 32 minutes  Critical care time was exclusive of separately billable procedures and treating other patients and teaching time.  Critical care was necessary to treat or prevent imminent or life-threatening deterioration of the following conditions: renal failure (Covid and elevated troponin ).  Critical care was time spent personally by me on the following activities: blood draw for specimens, development of treatment plan with patient or surrogate, discussions with consultants, interpretation of cardiac output measurements, evaluation of patient's response to treatment, examination of patient, obtaining history from patient or surrogate, ordering and performing treatments and interventions, ordering and review of laboratory studies, ordering and review of radiographic studies, pulse oximetry, re-evaluation of patient's condition and review of old charts.        ED Vital Signs:  Vitals:    06/17/20 0812 06/17/20 0826 06/17/20 0830 06/17/20 0832   BP: (!) 99/56 94/66     Pulse: 74 82 80    Resp: (!) 22 (!) 25     Temp: 97 °F (36.1 °C)      TempSrc: Oral      SpO2:  "95%      Weight:    84.9 kg (187 lb 1.6 oz)   Height: 5' 8" (1.727 m)       06/17/20 0847 06/17/20 0901 06/17/20 0902 06/17/20 0931   BP: (!) 93/56 97/60  105/71   Pulse: 81 80 80 88   Resp: (!) 27  (!) 22 (!) 25   Temp:       TempSrc:       SpO2: 100% 98% 96% 98%   Weight:       Height:           Abnormal Lab Results:  Labs Reviewed   CBC W/ AUTO DIFFERENTIAL - Abnormal; Notable for the following components:       Result Value    WBC 3.13 (*)     Hemoglobin 12.6 (*)     Mean Corpuscular Volume 77 (*)     Mean Corpuscular Hemoglobin 24.0 (*)     Mean Corpuscular Hemoglobin Conc 31.1 (*)     RDW 20.2 (*)     Platelets 103 (*)     Gran # (ANC) 1.5 (*)     All other components within normal limits   COMPREHENSIVE METABOLIC PANEL - Abnormal; Notable for the following components:    Sodium 131 (*)     CO2 22 (*)     Glucose 119 (*)     BUN, Bld 62 (*)     Creatinine 4.1 (*)     Calcium 8.1 (*)     Albumin 2.5 (*)     AST 59 (*)     eGFR if  16 (*)     eGFR if non  14 (*)     All other components within normal limits   TROPONIN I - Abnormal; Notable for the following components:    Troponin I 0.139 (*)     All other components within normal limits   B-TYPE NATRIURETIC PEPTIDE - Abnormal; Notable for the following components:    BNP 1,874 (*)     All other components within normal limits   SARS-COV-2 RNA AMPLIFICATION, QUAL - Abnormal; Notable for the following components:    SARS-CoV-2 RNA, Amplification, Qual Positive (*)     All other components within normal limits   PROTIME-INR   URINALYSIS, REFLEX TO URINE CULTURE   LACTIC ACID, PLASMA   SEDIMENTATION RATE   C-REACTIVE PROTEIN   D DIMER, QUANTITATIVE   FERRITIN   POCT GLUCOSE MONITORING CONTINUOUS        All Lab Results:  Results for orders placed or performed during the hospital encounter of 06/17/20   CBC auto differential   Result Value Ref Range    WBC 3.13 (L) 3.90 - 12.70 K/uL    RBC 5.26 4.60 - 6.20 M/uL    Hemoglobin 12.6 (L) " 14.0 - 18.0 g/dL    Hematocrit 40.5 40.0 - 54.0 %    Mean Corpuscular Volume 77 (L) 82 - 98 fL    Mean Corpuscular Hemoglobin 24.0 (L) 27.0 - 31.0 pg    Mean Corpuscular Hemoglobin Conc 31.1 (L) 32.0 - 36.0 g/dL    RDW 20.2 (H) 11.5 - 14.5 %    Platelets 103 (L) 150 - 350 K/uL    MPV SEE COMMENT 9.2 - 12.9 fL    Immature Granulocytes 0.3 0.0 - 0.5 %    Gran # (ANC) 1.5 (L) 1.8 - 7.7 K/uL    Immature Grans (Abs) 0.01 0.00 - 0.04 K/uL    Lymph # 1.1 1.0 - 4.8 K/uL    Mono # 0.4 0.3 - 1.0 K/uL    Eos # 0.0 0.0 - 0.5 K/uL    Baso # 0.01 0.00 - 0.20 K/uL    nRBC 0 0 /100 WBC    Gran% 49.3 38.0 - 73.0 %    Lymph% 36.4 18.0 - 48.0 %    Mono% 13.1 4.0 - 15.0 %    Eosinophil% 0.6 0.0 - 8.0 %    Basophil% 0.3 0.0 - 1.9 %    Differential Method Automated    Comprehensive metabolic panel   Result Value Ref Range    Sodium 131 (L) 136 - 145 mmol/L    Potassium 3.7 3.5 - 5.1 mmol/L    Chloride 99 95 - 110 mmol/L    CO2 22 (L) 23 - 29 mmol/L    Glucose 119 (H) 70 - 110 mg/dL    BUN, Bld 62 (H) 8 - 23 mg/dL    Creatinine 4.1 (H) 0.5 - 1.4 mg/dL    Calcium 8.1 (L) 8.7 - 10.5 mg/dL    Total Protein 6.6 6.0 - 8.4 g/dL    Albumin 2.5 (L) 3.5 - 5.2 g/dL    Total Bilirubin 1.0 0.1 - 1.0 mg/dL    Alkaline Phosphatase 87 55 - 135 U/L    AST 59 (H) 10 - 40 U/L    ALT 25 10 - 44 U/L    Anion Gap 10 8 - 16 mmol/L    eGFR if African American 16 (A) >60 mL/min/1.73 m^2    eGFR if non African American 14 (A) >60 mL/min/1.73 m^2   Troponin I   Result Value Ref Range    Troponin I 0.139 (H) 0.000 - 0.026 ng/mL   Brain natriuretic peptide   Result Value Ref Range    BNP 1,874 (H) 0 - 99 pg/mL   Protime-INR   Result Value Ref Range    Prothrombin Time 11.8 9.0 - 12.5 sec    INR 1.1 0.8 - 1.2   COVID-19 Rapid Screening   Result Value Ref Range    SARS-CoV-2 RNA, Amplification, Qual Positive (A) Negative     *Note: Due to a large number of results and/or encounters for the requested time period, some results have not been displayed. A complete set of  results can be found in Results Review.         Imaging Results:  Imaging Results          X-Ray Chest AP Portable (Final result)  Result time 06/17/20 09:11:42    Final result by Rhys Blas III, MD (06/17/20 09:11:42)                 Impression:      Slight increase in the the vascular markings suggested, a little greater at the right base.  Mild congestion?  No overt failure.  Follow-up recommended      Electronically signed by: Rhys Blsa MD  Date:    06/17/2020  Time:    09:11             Narrative:    EXAMINATION:  XR CHEST AP PORTABLE    CLINICAL HISTORY:  CHF;    COMPARISON:  May 28th    FINDINGS:  The heart size remains enlarged with a permanent bipolar pacing device in position on the left.  The lungs show no consolidation or effusion.  Slight increase in the vascular markings suggested, a little greater at the right base.                               The EKG was ordered, reviewed, and independently interpreted by the ED provider.  Interpretation time: 8:55  Rate: 80 BPM  Rhythm: Electronic ventricular pacemaker  Interpretation: No acute ST changes. No STEMI.           The Emergency Provider reviewed the vital signs and test results, which are outlined above.    ED Discussion     10:18 AM: Discussed case with Cheyenne Hernandez NP (Hospital Medicine). Dr. Simpson agrees with current care and management of pt and accepts admission.   Admitting Service: Hospital Medicine  Admitting Physician: Dr. Simpson  Admit to: Obs Tele    10:18 AM: Re-evaluated pt. I have discussed test results, shared treatment plan, and the need for admission with patient at bedside. Pt expresses understanding at this time and agree with all information. All questions answered. Pt has no further questions or concerns at this time. Pt is ready for admit.    11:08 AM   patient is stable nontoxic.  Acute on chronic renal failure shortness of breath and positive COVID test.  He does have an elevation in his troponin which  may be related significantly to his renal failure.  Patient is not altered to my exam.  Patient being admitted to Hospital Medicine.       ED Medication(s):  Medications   sodium chloride 0.9% bolus 250 mL (250 mLs Intravenous New Bag 6/17/20 0949)   aspirin tablet 325 mg (325 mg Oral Given 6/17/20 0949)             Medical Decision Making    Medical Decision Making:   Clinical Tests:   Lab Tests: Ordered and Reviewed  Radiological Study: Ordered and Reviewed  Medical Tests: Ordered and Reviewed           Scribe Attestation:   Scribe #1: I performed the above scribed service and the documentation accurately describes the services I performed. I attest to the accuracy of the note.    Attending:   Physician Attestation Statement for Scribe #1: I, Donald Rossi Jr., MD, personally performed the services described in this documentation, as scribed by Maya Gabriel, in my presence, and it is both accurate and complete.          Clinical Impression       ICD-10-CM ICD-9-CM   1. COVID-19 virus infection  U07.1    2. SOB (shortness of breath)  R06.02 786.05   3. Shortness of breath  R06.02 786.05   4. Troponin I above reference range  R79.89 790.6   5. Acute renal failure superimposed on chronic kidney disease, unspecified CKD stage, unspecified acute renal failure type  N17.9 584.9    N18.9 585.9       Disposition:   Disposition: Placed in Observation  Condition: Fair         Donald Rossi Jr., MD  06/17/20 9530

## 2020-06-18 LAB
ANION GAP SERPL CALC-SCNC: 14 MMOL/L (ref 8–16)
BASOPHILS # BLD AUTO: 0.01 K/UL (ref 0–0.2)
BASOPHILS NFR BLD: 0.3 % (ref 0–1.9)
BUN SERPL-MCNC: 59 MG/DL (ref 8–23)
CALCIUM SERPL-MCNC: 8.1 MG/DL (ref 8.7–10.5)
CHLORIDE SERPL-SCNC: 106 MMOL/L (ref 95–110)
CO2 SERPL-SCNC: 16 MMOL/L (ref 23–29)
CREAT SERPL-MCNC: 3.2 MG/DL (ref 0.5–1.4)
DIFFERENTIAL METHOD: ABNORMAL
EOSINOPHIL # BLD AUTO: 0 K/UL (ref 0–0.5)
EOSINOPHIL NFR BLD: 0.5 % (ref 0–8)
ERYTHROCYTE [DISTWIDTH] IN BLOOD BY AUTOMATED COUNT: 20.3 % (ref 11.5–14.5)
EST. GFR  (AFRICAN AMERICAN): 22 ML/MIN/1.73 M^2
EST. GFR  (NON AFRICAN AMERICAN): 19 ML/MIN/1.73 M^2
GLUCOSE SERPL-MCNC: 139 MG/DL (ref 70–110)
HCT VFR BLD AUTO: 40.6 % (ref 40–54)
HGB BLD-MCNC: 12.8 G/DL (ref 14–18)
IMM GRANULOCYTES # BLD AUTO: 0.01 K/UL (ref 0–0.04)
IMM GRANULOCYTES NFR BLD AUTO: 0.3 % (ref 0–0.5)
LYMPHOCYTES # BLD AUTO: 1.1 K/UL (ref 1–4.8)
LYMPHOCYTES NFR BLD: 29.3 % (ref 18–48)
MCH RBC QN AUTO: 24.3 PG (ref 27–31)
MCHC RBC AUTO-ENTMCNC: 31.5 G/DL (ref 32–36)
MCV RBC AUTO: 77 FL (ref 82–98)
MONOCYTES # BLD AUTO: 0.3 K/UL (ref 0.3–1)
MONOCYTES NFR BLD: 8.7 % (ref 4–15)
NEUTROPHILS # BLD AUTO: 2.2 K/UL (ref 1.8–7.7)
NEUTROPHILS NFR BLD: 60.9 % (ref 38–73)
NRBC BLD-RTO: 0 /100 WBC
PLATELET # BLD AUTO: 116 K/UL (ref 150–350)
PMV BLD AUTO: ABNORMAL FL (ref 9.2–12.9)
POCT GLUCOSE: 101 MG/DL (ref 70–110)
POCT GLUCOSE: 119 MG/DL (ref 70–110)
POCT GLUCOSE: 135 MG/DL (ref 70–110)
POTASSIUM SERPL-SCNC: 3.6 MMOL/L (ref 3.5–5.1)
RBC # BLD AUTO: 5.26 M/UL (ref 4.6–6.2)
SODIUM SERPL-SCNC: 136 MMOL/L (ref 136–145)
WBC # BLD AUTO: 3.68 K/UL (ref 3.9–12.7)

## 2020-06-18 PROCEDURE — 94640 AIRWAY INHALATION TREATMENT: CPT

## 2020-06-18 PROCEDURE — 99223 PR INITIAL HOSPITAL CARE,LEVL III: ICD-10-PCS | Mod: CR,,, | Performed by: INTERNAL MEDICINE

## 2020-06-18 PROCEDURE — 25000003 PHARM REV CODE 250: Performed by: NURSE PRACTITIONER

## 2020-06-18 PROCEDURE — 36415 COLL VENOUS BLD VENIPUNCTURE: CPT

## 2020-06-18 PROCEDURE — 99223 1ST HOSP IP/OBS HIGH 75: CPT | Mod: CR,,, | Performed by: INTERNAL MEDICINE

## 2020-06-18 PROCEDURE — 25000003 PHARM REV CODE 250: Performed by: HOSPITALIST

## 2020-06-18 PROCEDURE — 85025 COMPLETE CBC W/AUTO DIFF WBC: CPT

## 2020-06-18 PROCEDURE — 80048 BASIC METABOLIC PNL TOTAL CA: CPT

## 2020-06-18 PROCEDURE — C9399 UNCLASSIFIED DRUGS OR BIOLOG: HCPCS | Performed by: NURSE PRACTITIONER

## 2020-06-18 PROCEDURE — C1751 CATH, INF, PER/CENT/MIDLINE: HCPCS

## 2020-06-18 PROCEDURE — 36573 INSJ PICC RS&I 5 YR+: CPT

## 2020-06-18 PROCEDURE — 94761 N-INVAS EAR/PLS OXIMETRY MLT: CPT

## 2020-06-18 PROCEDURE — 21400001 HC TELEMETRY ROOM

## 2020-06-18 RX ORDER — ISOSORBIDE DINITRATE 10 MG/1
10 TABLET ORAL 3 TIMES DAILY
Status: DISCONTINUED | OUTPATIENT
Start: 2020-06-18 | End: 2020-06-23

## 2020-06-18 RX ORDER — BUMETANIDE 0.5 MG/1
0.5 TABLET ORAL 2 TIMES DAILY
Status: DISCONTINUED | OUTPATIENT
Start: 2020-06-19 | End: 2020-06-19

## 2020-06-18 RX ORDER — HYDRALAZINE HYDROCHLORIDE 25 MG/1
25 TABLET, FILM COATED ORAL EVERY 8 HOURS
Status: DISCONTINUED | OUTPATIENT
Start: 2020-06-18 | End: 2020-06-23

## 2020-06-18 RX ADMIN — INSULIN DETEMIR 10 UNITS: 100 INJECTION, SOLUTION SUBCUTANEOUS at 09:06

## 2020-06-18 RX ADMIN — ASPIRIN 81 MG: 81 TABLET, COATED ORAL at 06:06

## 2020-06-18 RX ADMIN — ALLOPURINOL 100 MG: 100 TABLET ORAL at 08:06

## 2020-06-18 RX ADMIN — OXYCODONE HYDROCHLORIDE AND ACETAMINOPHEN 500 MG: 500 TABLET ORAL at 08:06

## 2020-06-18 RX ADMIN — DORZOLAMIDE HYDROCHLORIDE AND TIMOLOL MALEATE 1 DROP: 20; 5 SOLUTION/ DROPS OPHTHALMIC at 09:06

## 2020-06-18 RX ADMIN — THERA TABS 1 TABLET: TAB at 08:06

## 2020-06-18 RX ADMIN — DORZOLAMIDE HYDROCHLORIDE AND TIMOLOL MALEATE 1 DROP: 20; 5 SOLUTION/ DROPS OPHTHALMIC at 08:06

## 2020-06-18 RX ADMIN — SIMVASTATIN 10 MG: 5 TABLET, FILM COATED ORAL at 09:06

## 2020-06-18 RX ADMIN — HYDRALAZINE HYDROCHLORIDE 25 MG: 25 TABLET, FILM COATED ORAL at 09:06

## 2020-06-18 RX ADMIN — DOXYCYCLINE HYCLATE 100 MG: 100 TABLET, COATED ORAL at 09:06

## 2020-06-18 RX ADMIN — ISOSORBIDE DINITRATE 10 MG: 10 TABLET ORAL at 09:06

## 2020-06-18 RX ADMIN — ALBUTEROL SULFATE 2 PUFF: 90 AEROSOL, METERED RESPIRATORY (INHALATION) at 03:06

## 2020-06-18 RX ADMIN — LATANOPROST 1 DROP: 50 SOLUTION OPHTHALMIC at 09:06

## 2020-06-18 RX ADMIN — PANTOPRAZOLE SODIUM 40 MG: 40 TABLET, DELAYED RELEASE ORAL at 08:06

## 2020-06-18 RX ADMIN — ALBUTEROL SULFATE 2 PUFF: 90 AEROSOL, METERED RESPIRATORY (INHALATION) at 08:06

## 2020-06-18 RX ADMIN — ALBUTEROL SULFATE 2 PUFF: 90 AEROSOL, METERED RESPIRATORY (INHALATION) at 12:06

## 2020-06-18 RX ADMIN — ACETAMINOPHEN 650 MG: 325 TABLET ORAL at 09:06

## 2020-06-18 RX ADMIN — DOXYCYCLINE HYCLATE 100 MG: 100 TABLET, COATED ORAL at 08:06

## 2020-06-18 NOTE — HOSPITAL COURSE
6/18  Patient states his breathing has improved. Renal function worsened from prior study - Nephrology has been consulted. Discussed with Cardiology. Patient will have PICC placed to measure Cardiac Output. Patient a candidate for Remdesivir pending ID review and recommendation. Patient reports SOB + Cough.   6/22- Patient seen at bedside, resting comfortably. Continues to improves does not report any acute complaints.   6/23- Patient seen this morning at bedside, resting comfortably. Reports sleeping well. Notes that he feels wells and has no specific complaints. T max 100.6 overnight.   6/24 - Patient seen at bedside, resting comfortably. His PICC line was removed about 4cm, new PICC line placed this morning. Patient has no acute complaints.   6/25- Patient seen this morning at bedside, resting comfortably. Had new PICC placed yesterday. Discussed working with PT today, His labs continue to improve. He has no acute complaints at this moment.   6/26- Patient seen at bedside this morning, eating breakfast. Is feeling well. Seen by PT, will be going home with Lalit Naqvi, Bedside [facility] and Home health today. Patient labs continue to improve. Discussed close follow up with Cardiology and Nephrology post discharge. Discussed with wife follow up care as well. Patient stable for discharge.

## 2020-06-18 NOTE — NURSING
Patient's wife (Elsie) updated on current plan of care via phone. Informed her that we would call if anything changes and with further updates. She verbalized understanding.

## 2020-06-18 NOTE — SUBJECTIVE & OBJECTIVE
Past Medical History:   Diagnosis Date    Arthritis     CAD (coronary artery disease)     Cataract     Chronic combined systolic and diastolic congestive heart failure 3/24/2015    CKD (chronic kidney disease), stage III     Diabetes mellitus type II      AM    Diabetic retinopathy     anti Veg F injections for macular edema    Diverticulitis of colon     ED (erectile dysfunction)     Encounter for blood transfusion     Glaucoma     HTN (hypertension)     Hyperlipidemia     Ischemic cardiomyopathy     Obesity     JAHAIRA on CPAP     Pacemaker     Pulmonary HTN        Past Surgical History:   Procedure Laterality Date    CARDIAC CATHETERIZATION  2009    CHOLECYSTECTOMY      COLON SURGERY      Sigmoid resection    COLONOSCOPY N/A 10/11/2018    Procedure: COLONOSCOPY;  Surgeon: Quinn Aragon MD;  Location: [facility] ENDO;  Service: General;  Laterality: N/A;    EYE SURGERY      FLEXIBLE SIGMOIDOSCOPY N/A 1/18/2019    Procedure: SIGMOIDOSCOPY, FLEXIBLE;  Surgeon: Quinn Aragon MD;  Location: [facility] ENDO;  Service: General;  Laterality: N/A;    ILEOSTOMY N/A 10/30/2018    Procedure: CREATION, ILEOSTOMY;  Surgeon: Kevin Lindsay MD;  Location: [facility] OR;  Service: General;  Laterality: N/A;    Ileostomy reversal  01/2019    KNEE SURGERY      MOBILIZATION OF SPLENIC FLEXURE N/A 10/30/2018    Procedure: MOBILIZATION, SPLENIC FLEXURE;  Surgeon: Kevin Lindsay MD;  Location: [facility] OR;  Service: General;  Laterality: N/A;    REVISION COLOSTOMY N/A 10/30/2018    Procedure: REVISION OR CLOSURE, COLOSTOMY;  Surgeon: Kevin Lindsay MD;  Location: [facility] OR;  Service: General;  Laterality: N/A;  Parastomal Hernia Repair    RIGHT HEMICOLECTOMY Right 10/30/2018    Procedure: HEMICOLECTOMY, RIGHT;  Surgeon: Kevin Lindsay MD;  Location: [facility] OR;  Service: General;  Laterality: Right;       Review of patient's allergies indicates:  No Known Allergies  Current Facility-Administered Medications    Medication Frequency    acetaminophen tablet 650 mg Q4H PRN    albuterol inhaler 2 puff Q8H    aspirin EC tablet 81 mg QAM    carvediloL tablet 3.125 mg BID WM    dextrose 50% injection 12.5 g PRN    dextrose 50% injection 25 g PRN    dorzolamide-timolol 2-0.5% ophthalmic solution 1 drop BID    doxycycline tablet 100 mg Q12H    glucagon (human recombinant) injection 1 mg PRN    glucose chewable tablet 16 g PRN    glucose chewable tablet 24 g PRN    hydrALAZINE tablet 25 mg Q8H    insulin aspart U-100 pen 1-10 Units QID (AC + HS) PRN    insulin detemir U-100 pen 10 Units QHS    isosorbide dinitrate tablet 10 mg TID    latanoprost 0.005 % ophthalmic solution 1 drop Daily    multivitamin tablet Daily    ondansetron injection 4 mg Q8H PRN    pantoprazole EC tablet 40 mg Daily    simvastatin tablet 10 mg QHS    sodium chloride 0.9% flush 10 mL PRN     Family History     Problem Relation (Age of Onset)    Cancer Mother    Heart disease Father    No Known Problems Sister, Brother, Maternal Aunt, Maternal Uncle, Paternal Aunt, Paternal Uncle, Maternal Grandmother, Maternal Grandfather, Paternal Grandmother, Paternal Grandfather    Stroke Father        Tobacco Use    Smoking status: Never Smoker    Smokeless tobacco: Never Used   Substance and Sexual Activity    Alcohol use: No     Alcohol/week: 0.0 standard drinks     Frequency: Never    Drug use: No    Sexual activity: Not Currently     Review of Systems   Constitutional: Negative.    HENT: Negative.    Respiratory: Negative.    Cardiovascular: Negative.    Gastrointestinal: Negative.    Neurological: Negative.    Psychiatric/Behavioral: Negative.      Objective:     Vital Signs (Most Recent):  Temp: 99.8 °F (37.7 °C) (06/18/20 1521)  Pulse: 86 (06/18/20 1750)  Resp: 20 (06/18/20 1533)  BP: 105/68 (06/18/20 1521)  SpO2: 95 % (06/18/20 1533)  O2 Device (Oxygen Therapy): room air (06/18/20 1533) Vital Signs (24h Range):  Temp:  [97.3 °F (36.3  °C)-99.8 °F (37.7 °C)] 99.8 °F (37.7 °C)  Pulse:  [] 86  Resp:  [18-20] 20  SpO2:  [93 %-98 %] 95 %  BP: (102-119)/(60-68) 105/68     Weight: 85 kg (187 lb 6.3 oz) (06/18/20 0600)  Body mass index is 28.49 kg/m².  Body surface area is 2.02 meters squared.    I/O last 3 completed shifts:  In: 770.8 [P.O.:240; I.V.:30.8; IV Piggyback:500]  Out: -     Physical Exam  Vitals signs and nursing note reviewed.   Constitutional:       Appearance: Normal appearance.   HENT:      Head: Normocephalic and atraumatic.   Neck:      Comments: No JVD  Cardiovascular:      Rate and Rhythm: Normal rate and regular rhythm.   Pulmonary:      Effort: Pulmonary effort is normal. No respiratory distress.      Breath sounds: Normal breath sounds. No rales.   Abdominal:      General: Abdomen is flat.   Musculoskeletal:      Right lower leg: No edema.   Skin:     General: Skin is warm and dry.   Neurological:      General: No focal deficit present.      Mental Status: He is alert.   Psychiatric:         Mood and Affect: Mood normal.         Behavior: Behavior normal.         Significant Labs: reviewed  BMP  Lab Results   Component Value Date     06/18/2020    K 3.6 06/18/2020     06/18/2020    CO2 16 (L) 06/18/2020    BUN 59 (H) 06/18/2020    CREATININE 3.2 (H) 06/18/2020    CALCIUM 8.1 (L) 06/18/2020    ANIONGAP 14 06/18/2020    ESTGFRAFRICA 22 (A) 06/18/2020    EGFRNONAA 19 (A) 06/18/2020     Lab Results   Component Value Date    WBC 3.68 (L) 06/18/2020    HGB 12.8 (L) 06/18/2020    HCT 40.6 06/18/2020    MCV 77 (L) 06/18/2020     (L) 06/18/2020       Recent Labs   Lab 06/17/20  0830   TROPONINI 0.139*     COVID-19 +  Significant Imaging: CXR reviewed, has mild pulmonary edema, cardiomegaly

## 2020-06-18 NOTE — HPI
64 yo male with PMHx of combined CHF with EF 15 %, CKD III, DM II and ischemic cardiomyopathy who presents to the ED due to weakness.   Since admission , COVID assay is now positive .  Chest x-ray -   Slight increase in the the vascular markings suggested, a little greater at the right base.  Mild congestion  Serum ferritin is normal

## 2020-06-18 NOTE — SUBJECTIVE & OBJECTIVE
Interval History: Remains weakened, slow rehydration in progress. Discussed with care team. PICC placement pending.    Review of Systems   Constitutional: Positive for activity change, chills and fatigue. Negative for appetite change and fever.   HENT: Negative.  Negative for congestion, sinus pressure and sore throat.    Eyes: Negative.  Negative for photophobia, discharge and visual disturbance.   Respiratory: Positive for cough, shortness of breath and wheezing. Negative for chest tightness.    Cardiovascular: Negative.  Negative for chest pain and palpitations.   Gastrointestinal: Negative.  Negative for abdominal pain, blood in stool, constipation, diarrhea, nausea and vomiting.   Endocrine: Negative.    Genitourinary: Negative.    Musculoskeletal: Negative.  Negative for myalgias, neck pain and neck stiffness.   Skin: Negative.    Allergic/Immunologic: Negative.    Neurological: Positive for weakness. Negative for seizures, syncope and headaches.   Hematological: Negative.    Psychiatric/Behavioral: Negative.  Negative for agitation, behavioral problems, hallucinations, self-injury and suicidal ideas.     Objective:     Vital Signs (Most Recent):  Temp: 99.3 °F (37.4 °C) (06/18/20 1200)  Pulse: 86 (06/18/20 1200)  Resp: 18 (06/18/20 1200)  BP: 102/67 (06/18/20 1200)  SpO2: (!) 93 % (06/18/20 1200) Vital Signs (24h Range):  Temp:  [97.3 °F (36.3 °C)-99.5 °F (37.5 °C)] 99.3 °F (37.4 °C)  Pulse:  [] 86  Resp:  [18-24] 18  SpO2:  [93 %-99 %] 93 %  BP: ()/(60-68) 102/67     Weight: 85 kg (187 lb 6.3 oz)  Body mass index is 28.49 kg/m².    Intake/Output Summary (Last 24 hours) at 6/18/2020 1302  Last data filed at 6/18/2020 0600  Gross per 24 hour   Intake 520.83 ml   Output --   Net 520.83 ml      Physical Exam  Vitals signs and nursing note reviewed.   Constitutional:       General: He is not in acute distress.     Appearance: He is well-developed. He is not ill-appearing.   HENT:      Head:  Normocephalic and atraumatic.      Nose: Nose normal.   Eyes:      General: No scleral icterus.     Conjunctiva/sclera: Conjunctivae normal.      Pupils: Pupils are equal, round, and reactive to light.   Neck:      Musculoskeletal: Normal range of motion and neck supple. No edema.      Vascular: No JVD.   Cardiovascular:      Rate and Rhythm: Normal rate and regular rhythm.      Heart sounds: Murmur present. Systolic murmur present with a grade of 3/6. No friction rub. No gallop.    Pulmonary:      Effort: Tachypnea present.      Breath sounds: Examination of the right-middle field reveals wheezing. Examination of the right-lower field reveals wheezing. Decreased breath sounds and wheezing present.   Abdominal:      General: Bowel sounds are normal.      Palpations: Abdomen is soft.   Musculoskeletal: Normal range of motion.         General: No tenderness.   Skin:     General: Skin is warm and dry.      Capillary Refill: Capillary refill takes 2 to 3 seconds.   Neurological:      Mental Status: He is alert and oriented to person, place, and time.   Psychiatric:         Behavior: Behavior normal.         Significant Labs:   BMP:   Recent Labs   Lab 06/17/20  0830   *   *   K 3.7   CL 99   CO2 22*   BUN 62*   CREATININE 4.1*   CALCIUM 8.1*     CBC:   Recent Labs   Lab 06/17/20  0830   WBC 3.13*   HGB 12.6*   HCT 40.5   *     CMP:   Recent Labs   Lab 06/17/20  0830   *   K 3.7   CL 99   CO2 22*   *   BUN 62*   CREATININE 4.1*   CALCIUM 8.1*   PROT 6.6   ALBUMIN 2.5*   BILITOT 1.0   ALKPHOS 87   AST 59*   ALT 25   ANIONGAP 10   EGFRNONAA 14*     Troponin:   Recent Labs   Lab 06/17/20  0830   TROPONINI 0.139*     Urine Studies:   Recent Labs   Lab 06/17/20  1426   COLORU Yellow   APPEARANCEUA Clear   PHUR 6.0   SPECGRAV 1.010   PROTEINUA 1+*   GLUCUA Negative   KETONESU Negative   BILIRUBINUA Negative   OCCULTUA Trace*   NITRITE Negative   UROBILINOGEN Negative   LEUKOCYTESUR Negative    RBCUA 2   WBCUA 0   BACTERIA None   HYALINECASTS 0     All pertinent labs within the past 24 hours have been reviewed.    Significant Imaging: I have reviewed all pertinent imaging results/findings within the past 24 hours.

## 2020-06-18 NOTE — ASSESSMENT & PLAN NOTE
"BROOKE on CKD stage 4.  BROOKE is due to prerenal azotemia from the use of large dose diuretics (bumex 2 mg tid and metolazone 5 mg qd)  s Cr change that occurs as a result of prerenal azotemia (for example, as result of diuretics use) does not pertain to GFR estimation, because pt would not be at steady state    Evidence for above is that s Cr has already improved (4.1 to 3.2) on diuretics withdrawal    Recommend a "happy medium", that is to say a dose of diuretics that will keep fluid status stable without causing prerenal azotemia.  Of course, pt himself can help by reducing all salt and fluid XS in diet so that his dependence of diuretics will be reduced (I had advised him in Dec 2019 and Jab 2020)    This pt clearly has very severe CHF and can not stay off diuretics for too long.  Recommend already restart bumed at 1 mg po bid  Keep metolazone off for now.    No indications for acute or chronic HD at present.    "

## 2020-06-18 NOTE — ASSESSMENT & PLAN NOTE
EF 15 % and grade II diastolic dysfunction  Compensated currently  Pt appears volume depleted likely from poor appetite secondary to viral illness  Will hold ACE/ARB, Bumex and metolazone  Gentle IV hydration  Monitor closely for S/S of volume overload    6/18  Gentle Hydration  PICC placement  Cardiology

## 2020-06-18 NOTE — ASSESSMENT & PLAN NOTE
Above baseline  Appears volume overload  Hold ACE/ARB  Gentle IV hydration    6/18  Nephrology  Monitor  Gentle Hydration  Hydralazine

## 2020-06-18 NOTE — ASSESSMENT & PLAN NOTE
Serum creatinine 4.1 (baseline 2.7)  Avoid nephrotoxic meds  Will hold Bumex and Metolazone for now as pt appears volume depleted  Gentle IV hydration  Hold ACE/ARB combination drug    6/18  Nephrology on consult  Hold Nephrotoxic meds

## 2020-06-18 NOTE — HPI
Pt was seen and examined. H/o and chart reviewed. Pt is a 64 y/o male with a h/o of CKD stage 3, severe combined diastolic and systolic CHF (EF 15%), DM, HTN, and obesity who presented with weakness and SOB. Pt was admitted for COVID-19 infection. Renal was consulted because s Cr has worsened from baseline of about 2.5 to 4.1, repeat today 3.2. Labs, meds, mgmt (both inpt and outpt) reviewed. Pt was on bumex 2 mg po tid and metolazone 5 mg po qd. No GI losses reported, denies NSAIds, no abx,, no recent infections. No other pertinent issues. Currently, he denies SOB, has no leg swelling. All the diuretics are on hold.

## 2020-06-18 NOTE — ASSESSMENT & PLAN NOTE
Criteria:  RR rate > 20, WBC 3.13  Lactic acid normal  B/P appropriate on arrival but did decline to 82/62 as pt is volume depleted  Blood cultures drawn  Normal saline 500 cc bolus given  Doxycyline given    6/18  Responding to gentle hydration  Monitor  Follow Cultures  ID

## 2020-06-18 NOTE — SUBJECTIVE & OBJECTIVE
Past Medical History:   Diagnosis Date    Arthritis     CAD (coronary artery disease)     Cataract     Chronic combined systolic and diastolic congestive heart failure 3/24/2015    CKD (chronic kidney disease), stage III     Diabetes mellitus type II      AM    Diabetic retinopathy     anti Veg F injections for macular edema    Diverticulitis of colon     ED (erectile dysfunction)     Encounter for blood transfusion     Glaucoma     HTN (hypertension)     Hyperlipidemia     Ischemic cardiomyopathy     Obesity     JAHAIRA on CPAP     Pacemaker     Pulmonary HTN        Past Surgical History:   Procedure Laterality Date    CARDIAC CATHETERIZATION  2009    CHOLECYSTECTOMY      COLON SURGERY      Sigmoid resection    COLONOSCOPY N/A 10/11/2018    Procedure: COLONOSCOPY;  Surgeon: Quinn Aragon MD;  Location: [facility] ENDO;  Service: General;  Laterality: N/A;    EYE SURGERY      FLEXIBLE SIGMOIDOSCOPY N/A 1/18/2019    Procedure: SIGMOIDOSCOPY, FLEXIBLE;  Surgeon: Quinn Aragon MD;  Location: [facility] ENDO;  Service: General;  Laterality: N/A;    ILEOSTOMY N/A 10/30/2018    Procedure: CREATION, ILEOSTOMY;  Surgeon: Kevin Lindsay MD;  Location: [facility] OR;  Service: General;  Laterality: N/A;    Ileostomy reversal  01/2019    KNEE SURGERY      MOBILIZATION OF SPLENIC FLEXURE N/A 10/30/2018    Procedure: MOBILIZATION, SPLENIC FLEXURE;  Surgeon: Kevin Lindsay MD;  Location: [facility] OR;  Service: General;  Laterality: N/A;    REVISION COLOSTOMY N/A 10/30/2018    Procedure: REVISION OR CLOSURE, COLOSTOMY;  Surgeon: Kevin Lindsay MD;  Location: [facility] OR;  Service: General;  Laterality: N/A;  Parastomal Hernia Repair    RIGHT HEMICOLECTOMY Right 10/30/2018    Procedure: HEMICOLECTOMY, RIGHT;  Surgeon: Kevin Lindsay MD;  Location: [facility] OR;  Service: General;  Laterality: Right;       Review of patient's allergies indicates:  No Known Allergies    Medications:  Medications Prior to  Admission   Medication Sig    albuterol (PROVENTIL/VENTOLIN HFA) 90 mcg/actuation inhaler Inhale 2 puffs into the lungs every 6 (six) hours as needed for Wheezing. Rescue    allopurinoL (ZYLOPRIM) 100 MG tablet Take 1 tablet (100 mg total) by mouth once daily.    aspirin (ECOTRIN) 81 MG EC tablet Take 81 mg by mouth every morning.     BD INSULIN SYRINGE ULTRA-FINE 0.5 mL 31 gauge x 5/16 Syrg USE AS DIRECTED TO INJECT INSULIN TWO TIMES DAILY    bimatoprost (LUMIGAN) 0.03 % ophthalmic drops Place 1 drop into the left eye every evening.    blood sugar diagnostic Strp 1 strip by Misc.(Non-Drug; Combo Route) route 4 (four) times daily. [facility]    brimonidine-timolol (COMBIGAN) 0.2-0.5 % Drop Place 1 drop into both eyes every 12 (twelve) hours.    bumetanide (BUMEX) 2 MG tablet TAKE 1 TABLET (2 MG TOTAL) BY MOUTH three times a day    carvediloL (COREG) 3.125 MG tablet Take 1 tablet (3.125 mg total) by mouth 2 (two) times daily with meals.    cholecalciferol, vitamin D3, (VITAMIN D3) 1,000 unit capsule Take 1,000 Units by mouth once daily.    dorzolamide-timolol 2-0.5% (COSOPT) 22.3-6.8 mg/mL ophthalmic solution Place 1 drop into both eyes 2 (two) times daily.    ENTRESTO 24-26 mg per tablet TAKE 1 TABLET BY MOUTH TWICE A DAY    hydrOXYzine HCL (ATARAX) 10 MG Tab Take 1 tablet (10 mg total) by mouth daily as needed.    insulin glargine (LANTUS U-100 INSULIN) 100 unit/mL injection INJECT 20 UNITS ONCE DAILY AS NEEDED WHEN BS< 150    IRON/VITAMIN B COMPLEX (GERITOL ORAL) Take by mouth.    latanoprost (XALATAN) 0.005 % ophthalmic solution Place 1 drop into both eyes once daily.    metOLazone (ZAROXOLYN) 5 MG tablet Take 1 tablet (5 mg total) by mouth once daily.    multivit-min/folic/vit K/lycop (MEN'S 50 PLUS MULTIVITAMIN ORAL) Take by mouth.    pantoprazole (PROTONIX) 40 MG tablet TAKE 1 TABLET BY MOUTH EVERY DAY FOR ACID REFLUX    simvastatin (ZOCOR) 10 MG tablet Take 1 tablet (10 mg total) by mouth  "every evening.    ketoconazole (NIZORAL) 2 % cream AAA bid prn rash of groin. For Flares.    lancets Misc For tel care    pen needle, diabetic (BD ULTRA-FINE JOSLYN PEN NEEDLE) 32 gauge x 5/32" Ndle 1 each by Misc.(Non-Drug; Combo Route) route 4 (four) times daily.     Antibiotics (From admission, onward)    Start     Stop Route Frequency Ordered    06/17/20 1200  doxycycline tablet 100 mg      -- Oral Every 12 hours 06/17/20 1148        Antifungals (From admission, onward)    None        Antivirals (From admission, onward)    None           Immunization History   Administered Date(s) Administered    Influenza - High Dose - PF (65 years and older) 10/08/2019    Influenza - Quadrivalent 10/02/2014, 11/17/2016    Influenza - Quadrivalent - PF (6 months and older) 10/16/2015, 11/02/2017    Influenza Split 11/04/2003, 10/05/2007, 10/07/2009, 11/19/2010, 01/22/2013    Pneumococcal Conjugate - 13 Valent 06/27/2018    Pneumococcal Polysaccharide - 23 Valent 11/13/2002, 02/05/2014, 04/08/2019    Tdap 10/16/2015       Family History     Problem Relation (Age of Onset)    Cancer Mother    Heart disease Father    No Known Problems Sister, Brother, Maternal Aunt, Maternal Uncle, Paternal Aunt, Paternal Uncle, Maternal Grandmother, Maternal Grandfather, Paternal Grandmother, Paternal Grandfather    Stroke Father        Social History     Socioeconomic History    Marital status:      Spouse name: Not on file    Number of children: 2    Years of education: Not on file    Highest education level: Not on file   Occupational History    Occupation: [employer]     Employer: [employer]   Social Needs    Financial resource strain: Not on file    Food insecurity     Worry: Not on file     Inability: Not on file    Transportation needs     Medical: Not on file     Non-medical: Not on file   Tobacco Use    Smoking status: Never Smoker    Smokeless tobacco: Never Used   Substance and Sexual Activity "    Alcohol use: No     Alcohol/week: 0.0 standard drinks     Frequency: Never    Drug use: No    Sexual activity: Not Currently   Lifestyle    Physical activity     Days per week: Not on file     Minutes per session: Not on file    Stress: Not on file   Relationships    Social connections     Talks on phone: Not on file     Gets together: Not on file     Attends Bahai service: Not on file     Active member of club or organization: Not on file     Attends meetings of clubs or organizations: Not on file     Relationship status: Not on file   Other Topics Concern    Not on file   Social History Narrative    . Lives with spouse. Has 2 children. 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     Review of Systems  Objective:     Vital Signs (Most Recent):  Temp: 99.1 °F (37.3 °C) (06/18/20 0435)  Pulse: 93 (06/18/20 0500)  Resp: 18 (06/18/20 0500)  BP: 119/60 (06/18/20 0435)  SpO2: 96 % (06/18/20 0500) Vital Signs (24h Range):  Temp:  [97 °F (36.1 °C)-99.5 °F (37.5 °C)] 99.1 °F (37.3 °C)  Pulse:  [] 93  Resp:  [15-28] 18  SpO2:  [95 %-100 %] 96 %  BP: ()/(53-72) 119/60     Weight: 84.9 kg (187 lb 1.6 oz)  Body mass index is 28.45 kg/m².    Estimated Creatinine Clearance: 18.5 mL/min (A) (based on SCr of 4.1 mg/dL (H)).    Physical Exam  Vitals signs and nursing note reviewed.     PE as per primary team    Significant Labs:   Blood Culture:   Recent Labs   Lab 06/17/20  1220 06/17/20  1222   LABBLOO No Growth to date No Growth to date     BMP:   Recent Labs   Lab 06/17/20  0830   *   *   K 3.7   CL 99   CO2 22*   BUN 62*   CREATININE 4.1*   CALCIUM 8.1*     CBC:   Recent Labs   Lab 06/17/20  0830   WBC 3.13*   HGB 12.6*   HCT 40.5   *     All pertinent labs within the past 24 hours have been reviewed.    Significant Imaging: I have reviewed all pertinent imaging results/findings within the past 24 hours.

## 2020-06-18 NOTE — ASSESSMENT & PLAN NOTE
- COVID-19 testing   - Infection Control notified     - Isolation:   - Airborne, Contact and Droplet Precautions  - Cohort patients into COVID units              - Limit visitors per hospital policy              - Consolidating lab draws, nursing care, provider visits, and interventions      - Management:  Supplemental O2 to maintain SpO2 >92%  Continuous/intermittent Pulse Ox  Albuterol treatment PRN      Will continue supportive care , EF -15%, will be cautious with IVF or plasma transfusion, consider using Remdisivir   On empiric doxycycline

## 2020-06-18 NOTE — CONSULTS
"[facility] -   Nephrology  Consult Note    Patient Name: Yan Choudhury  MRN: [id number]  Admission Date: 6/17/2020  Hospital Length of Stay: 1 days  Attending Provider: Hernandez Rubio MD   Primary Care Physician: Sandra Estevez MD  Principal Problem:COVID-19 virus infection     Referring physician: Dr. Simpson  Reason for consult: BROOKE. "what should we do with the diuretics?"    Consults  Subjective:     HPI: Pt was seen and examined. H/o and chart reviewed. Pt is a 66 y/o male with a h/o of CKD stage 3, severe combined diastolic and systolic CHF (EF 15%), DM, HTN, and obesity who presented with weakness and SOB. Pt was admitted for COVID-19 infection. Renal was consulted because s Cr has worsened from baseline of about 2.5 to 4.1, repeat today 3.2. Labs, meds, mgmt (both inpt and outpt) reviewed. Pt was on bumex 2 mg po tid and metolazone 5 mg po qd. No GI losses reported, denies NSAIds, no abx,, no recent infections. No other pertinent issues. Currently, he denies SOB, has no leg swelling. All the diuretics are on hold.    Past Medical History:   Diagnosis Date    Arthritis     CAD (coronary artery disease)     Cataract     Chronic combined systolic and diastolic congestive heart failure 3/24/2015    CKD (chronic kidney disease), stage III     Diabetes mellitus type II      AM    Diabetic retinopathy     anti Veg F injections for macular edema    Diverticulitis of colon     ED (erectile dysfunction)     Encounter for blood transfusion     Glaucoma     HTN (hypertension)     Hyperlipidemia     Ischemic cardiomyopathy     Obesity     JAHAIRA on CPAP     Pacemaker     Pulmonary HTN        Past Surgical History:   Procedure Laterality Date    CARDIAC CATHETERIZATION  2009    CHOLECYSTECTOMY      COLON SURGERY      Sigmoid resection    COLONOSCOPY N/A 10/11/2018    Procedure: COLONOSCOPY;  Surgeon: Quinn Aragon MD;  Location: [facility];  Service: General;  Laterality: N/A;    " EYE SURGERY      FLEXIBLE SIGMOIDOSCOPY N/A 1/18/2019    Procedure: SIGMOIDOSCOPY, FLEXIBLE;  Surgeon: Quinn Aragon MD;  Location: [facility] ENDO;  Service: General;  Laterality: N/A;    ILEOSTOMY N/A 10/30/2018    Procedure: CREATION, ILEOSTOMY;  Surgeon: Kevin Lindsay MD;  Location: [facility] OR;  Service: General;  Laterality: N/A;    Ileostomy reversal  01/2019    KNEE SURGERY      MOBILIZATION OF SPLENIC FLEXURE N/A 10/30/2018    Procedure: MOBILIZATION, SPLENIC FLEXURE;  Surgeon: Kevin Lindsay MD;  Location: [facility] OR;  Service: General;  Laterality: N/A;    REVISION COLOSTOMY N/A 10/30/2018    Procedure: REVISION OR CLOSURE, COLOSTOMY;  Surgeon: Kevin Lindsay MD;  Location: [facility] OR;  Service: General;  Laterality: N/A;  Parastomal Hernia Repair    RIGHT HEMICOLECTOMY Right 10/30/2018    Procedure: HEMICOLECTOMY, RIGHT;  Surgeon: Kevin Lindsay MD;  Location: [facility] OR;  Service: General;  Laterality: Right;       Review of patient's allergies indicates:  No Known Allergies  Current Facility-Administered Medications   Medication Frequency    acetaminophen tablet 650 mg Q4H PRN    albuterol inhaler 2 puff Q8H    aspirin EC tablet 81 mg QAM    carvediloL tablet 3.125 mg BID WM    dextrose 50% injection 12.5 g PRN    dextrose 50% injection 25 g PRN    dorzolamide-timolol 2-0.5% ophthalmic solution 1 drop BID    doxycycline tablet 100 mg Q12H    glucagon (human recombinant) injection 1 mg PRN    glucose chewable tablet 16 g PRN    glucose chewable tablet 24 g PRN    hydrALAZINE tablet 25 mg Q8H    insulin aspart U-100 pen 1-10 Units QID (AC + HS) PRN    insulin detemir U-100 pen 10 Units QHS    isosorbide dinitrate tablet 10 mg TID    latanoprost 0.005 % ophthalmic solution 1 drop Daily    multivitamin tablet Daily    ondansetron injection 4 mg Q8H PRN    pantoprazole EC tablet 40 mg Daily    simvastatin tablet 10 mg QHS    sodium chloride 0.9% flush 10 mL PRN     Family History      Problem Relation (Age of Onset)    Cancer Mother    Heart disease Father    No Known Problems Sister, Brother, Maternal Aunt, Maternal Uncle, Paternal Aunt, Paternal Uncle, Maternal Grandmother, Maternal Grandfather, Paternal Grandmother, Paternal Grandfather    Stroke Father        Tobacco Use    Smoking status: Never Smoker    Smokeless tobacco: Never Used   Substance and Sexual Activity    Alcohol use: No     Alcohol/week: 0.0 standard drinks     Frequency: Never    Drug use: No    Sexual activity: Not Currently     Review of Systems   Constitutional: Negative.    HENT: Negative.    Respiratory: Negative.    Cardiovascular: Negative.    Gastrointestinal: Negative.    Neurological: Negative.    Psychiatric/Behavioral: Negative.      Objective:     Vital Signs (Most Recent):  Temp: 99.8 °F (37.7 °C) (06/18/20 1521)  Pulse: 86 (06/18/20 1750)  Resp: 20 (06/18/20 1533)  BP: 105/68 (06/18/20 1521)  SpO2: 95 % (06/18/20 1533)  O2 Device (Oxygen Therapy): room air (06/18/20 1533) Vital Signs (24h Range):  Temp:  [97.3 °F (36.3 °C)-99.8 °F (37.7 °C)] 99.8 °F (37.7 °C)  Pulse:  [] 86  Resp:  [18-20] 20  SpO2:  [93 %-98 %] 95 %  BP: (102-119)/(60-68) 105/68     Weight: 85 kg (187 lb 6.3 oz) (06/18/20 0600)  Body mass index is 28.49 kg/m².  Body surface area is 2.02 meters squared.    I/O last 3 completed shifts:  In: 770.8 [P.O.:240; I.V.:30.8; IV Piggyback:500]  Out: -     Physical Exam  Vitals signs and nursing note reviewed.   Constitutional:       Appearance: Normal appearance.   HENT:      Head: Normocephalic and atraumatic.   Neck:      Comments: No JVD  Cardiovascular:      Rate and Rhythm: Normal rate and regular rhythm.   Pulmonary:      Effort: Pulmonary effort is normal. No respiratory distress.      Breath sounds: Normal breath sounds. No rales.   Abdominal:      General: Abdomen is flat.   Musculoskeletal:      Right lower leg: No edema.   Skin:     General: Skin is warm and dry.   Neurological:  "     General: No focal deficit present.      Mental Status: He is alert.   Psychiatric:         Mood and Affect: Mood normal.         Behavior: Behavior normal.         Significant Labs: reviewed  BMP  Lab Results   Component Value Date     06/18/2020    K 3.6 06/18/2020     06/18/2020    CO2 16 (L) 06/18/2020    BUN 59 (H) 06/18/2020    CREATININE 3.2 (H) 06/18/2020    CALCIUM 8.1 (L) 06/18/2020    ANIONGAP 14 06/18/2020    ESTGFRAFRICA 22 (A) 06/18/2020    EGFRNONAA 19 (A) 06/18/2020     Lab Results   Component Value Date    WBC 3.68 (L) 06/18/2020    HGB 12.8 (L) 06/18/2020    HCT 40.6 06/18/2020    MCV 77 (L) 06/18/2020     (L) 06/18/2020       Recent Labs   Lab 06/17/20  0830   TROPONINI 0.139*     COVID-19 +  Significant Imaging: CXR reviewed, has mild pulmonary edema, cardiomegaly    Assessment/Plan:     64 y/o male with BROOKE on CKD stage 3-4 and h/o of severe CHF:    Acute kidney injury superimposed on CKD  BROOKE on CKD stage 4.  Increase in s Cr has occurred as a result of overdiuresis.  BROOKE is due to prerenal azotemia from the use of large dose of diuretics (bumex 2 mg tid and metolazone 5 mg qd)  s Cr change that occurs as a result of prerenal azotemia (for example, as a result of diuretics use) does not pertain to GFR estimation, because pt would not be at steady state    Supportive evidence for the above statement is that s Cr has already improved (4.1 to 3.2) on diuretics withdrawal    Recommend a "happy medium"; that is to say, a dose of diuretics that will keep fluid status stable without causing prerenal azotemia.  Of course, pt himself can help by reducing all salt and fluid XS in diet so that his dependence of diuretics will be reduced (I had advised him in Dec 2019 and Jan 2020)    This pt clearly has very severe CHF and can not stay off diuretics for too long.  Pt is euvolemic.  Recommend already restart bumed at 0.5 mg po bid, starting tomorrow, as increase dose gradually as " needed for sx's or signs of pulmonary and peripheral edema  Keep metolazone off for now.    No indications for acute or chronic HD at present.      Chronic combined systolic and diastolic congestive heart failure  Severe CHF, combined diastolic and systolic.  Mgmt from the renal view point as above      * COVID-19 virus infection  Will defer to PCP  In isolation        Plans and recommendations:  As discussed above  Total time spent 70 minutes including time needed to review the records, the   patient evaluation, documentation, face-to-face discussion with the patient,   more than 50% of the time was spent on coordination of care and counseling.    Level V visit.  Discussed with primary team      Juan Luis Galeas MD   Nephrology  [facility]

## 2020-06-18 NOTE — PLAN OF CARE
The patient or parent/caregiver has been provided with the remdesivir Fact Sheet for Patients and Parents/Caregivers and has been counseled that the FDA has authorized the emergency use of remdesivir, which is not an FDA approved drug. The significant known and potential risks and benefits are unknown. The patient or parent/caregiver has been given the option to accept or refuse and has verbally agreed to receive remdesivir. Daily labs will be ordered and monitoring for Serious Adverse Events will be performed.

## 2020-06-18 NOTE — PLAN OF CARE
Ongoing (interventions implemented as appropriate)  Pt confused at times  VSS  Pt able to make needs known.  Pt remained afebrile throughout this shift.   Pt remained free of falls this shift.   Pt denies pain this shift.  Plan of care reviewed. Patient verbalizes understanding.   Pt moving/turing independent. Frequent weight shifting encouraged.  Patient sinus rhythm on monitor.   Bed low, side rails up x 2, wheels locked, call light in reach.   Hourly rounding completed.   Will continue to monitor.

## 2020-06-18 NOTE — PLAN OF CARE
CM spoke with patient via telephone to assess for discharge needs. Pt states that he lives at home with his spouse, and is independent with his needs. He does use a glucometer and CPAP for assistance. He denies having home health and does not anticipate any discharge needs. CM provided a transitional care folder, information on advanced directives, information on pharmacy bedside delivery, and discharge planning begins on admission with contact information for any needs/questions.     D/C Plan: Home  PCP: Sandra Estevez MD  Preferred Pharmacy: [facility]  Discharge transportation: Family  [facility]: Send link  Pharmacy Bedside Delivery: No       06/18/20 4230   Discharge Assessment   Assessment Type Discharge Planning Assessment   Confirmed/corrected address and phone number on facesheet? Yes   Assessment information obtained from? Patient   Expected Length of Stay (days)   (TBD)   Communicated expected length of stay with patient/caregiver yes   Prior to hospitilization cognitive status: Alert/Oriented   Prior to hospitalization functional status: Independent;Assistive Equipment   Current cognitive status: Alert/Oriented   Current Functional Status: Independent;Assistive Equipment   Facility Arrived From: Home   Lives With spouse   Able to Return to Prior Arrangements yes   Is patient able to care for self after discharge? Yes   Who are your caregiver(s) and their phone number(s)? Jessica Chouduhry, Spouse- [phone]   Patient's perception of discharge disposition home or selfcare   Readmission Within the Last 30 Days no previous admission in last 30 days   Patient currently being followed by outpatient case management? No   Patient currently receives any other outside agency services? No   Equipment Currently Used at Home glucometer;CPAP   Do you have any problems affording any of your prescribed medications? No   Is the patient taking medications as prescribed? yes   Does the patient have transportation  home? Yes   Transportation Anticipated family or friend will provide   Dialysis Name and Scheduled days NA   Does the patient receive services at the [facility]? No   Discharge Plan A Home with family   DME Needed Upon Discharge  none   Patient/Family in Agreement with Plan yes

## 2020-06-18 NOTE — PROGRESS NOTES
[facility] Hospital Medicine  Progress Note    Patient Name: Yan Choudhury  MRN: [id number]  Patient Class: IP- Inpatient   Admission Date: 6/17/2020  Length of Stay: 1 days  Attending Physician: Hernandez Rubio MD  Primary Care Provider: Sandra Estevez MD        Subjective:     Principal Problem:COVID-19 virus infection        HPI:  Pt is a 64 yo male with PMHx of combined CHF with EF 15 %, CKD III, DM II and ischemic cardiomyopathy who presents to the ED due to weakness. Wife provides history as patient is a difficult historian. Wife explains pt has had a decreased appetite with increasing weakness onset for approx 6 days. Wife says that Cardiology mentioned possible dehydration. Wife says that pt has not been eating or drinking regularly with increased weakness, walking slow and increased fatigue. While in the ED, nurse states that pt was tachypnic with respiratory rate 26.   On arrival: Temp 97, pulse 74, resp 22, B/P 99/56 and pulse ox 95 on RA.   CXR - Slight increase in the the vascular markings suggested, a little greater at the right base.  Mild congestion?  No overt failure.    BNP = 1874 (last admission 2300)  Labs find WBC 3.13, Na 131, serum creatinine 4.1 (baseline 2.7), glucose 119  PT IS COVID +  Pt has received a saline bolus 250 cc in the ED. Afterwards, blood pressure noted 82/62 and a second saline bolus of 250 cc given.       Overview/Hospital Course:  6/18  Patient states his breathing has improved. Renal function worsened from prior study - Nephrology has been consulted. Discussed with Cardiology. Patient will have PICC placed to measure Cardiac Output. Patient a candidate for Remdesivir pending ID review and recommendation. Patient reports SOB + Cough.     Interval History: Remains weakened, slow rehydration in progress. Discussed with care team. PICC placement pending.    Review of Systems   Constitutional: Positive for activity change, chills and fatigue. Negative for appetite  change and fever.   HENT: Negative.  Negative for congestion, sinus pressure and sore throat.    Eyes: Negative.  Negative for photophobia, discharge and visual disturbance.   Respiratory: Positive for cough, shortness of breath and wheezing. Negative for chest tightness.    Cardiovascular: Negative.  Negative for chest pain and palpitations.   Gastrointestinal: Negative.  Negative for abdominal pain, blood in stool, constipation, diarrhea, nausea and vomiting.   Endocrine: Negative.    Genitourinary: Negative.    Musculoskeletal: Negative.  Negative for myalgias, neck pain and neck stiffness.   Skin: Negative.    Allergic/Immunologic: Negative.    Neurological: Positive for weakness. Negative for seizures, syncope and headaches.   Hematological: Negative.    Psychiatric/Behavioral: Negative.  Negative for agitation, behavioral problems, hallucinations, self-injury and suicidal ideas.     Objective:     Vital Signs (Most Recent):  Temp: 99.3 °F (37.4 °C) (06/18/20 1200)  Pulse: 86 (06/18/20 1200)  Resp: 18 (06/18/20 1200)  BP: 102/67 (06/18/20 1200)  SpO2: (!) 93 % (06/18/20 1200) Vital Signs (24h Range):  Temp:  [97.3 °F (36.3 °C)-99.5 °F (37.5 °C)] 99.3 °F (37.4 °C)  Pulse:  [] 86  Resp:  [18-24] 18  SpO2:  [93 %-99 %] 93 %  BP: ()/(60-68) 102/67     Weight: 85 kg (187 lb 6.3 oz)  Body mass index is 28.49 kg/m².    Intake/Output Summary (Last 24 hours) at 6/18/2020 1302  Last data filed at 6/18/2020 0600  Gross per 24 hour   Intake 520.83 ml   Output --   Net 520.83 ml      Physical Exam  Vitals signs and nursing note reviewed.   Constitutional:       General: He is not in acute distress.     Appearance: He is well-developed. He is not ill-appearing.   HENT:      Head: Normocephalic and atraumatic.      Nose: Nose normal.   Eyes:      General: No scleral icterus.     Conjunctiva/sclera: Conjunctivae normal.      Pupils: Pupils are equal, round, and reactive to light.   Neck:      Musculoskeletal:  Normal range of motion and neck supple. No edema.      Vascular: No JVD.   Cardiovascular:      Rate and Rhythm: Normal rate and regular rhythm.      Heart sounds: Murmur present. Systolic murmur present with a grade of 3/6. No friction rub. No gallop.    Pulmonary:      Effort: Tachypnea present.      Breath sounds: Examination of the right-middle field reveals wheezing. Examination of the right-lower field reveals wheezing. Decreased breath sounds and wheezing present.   Abdominal:      General: Bowel sounds are normal.      Palpations: Abdomen is soft.   Musculoskeletal: Normal range of motion.         General: No tenderness.   Skin:     General: Skin is warm and dry.      Capillary Refill: Capillary refill takes 2 to 3 seconds.   Neurological:      Mental Status: He is alert and oriented to person, place, and time.   Psychiatric:         Behavior: Behavior normal.         Significant Labs:   BMP:   Recent Labs   Lab 06/17/20  0830   *   *   K 3.7   CL 99   CO2 22*   BUN 62*   CREATININE 4.1*   CALCIUM 8.1*     CBC:   Recent Labs   Lab 06/17/20  0830   WBC 3.13*   HGB 12.6*   HCT 40.5   *     CMP:   Recent Labs   Lab 06/17/20  0830   *   K 3.7   CL 99   CO2 22*   *   BUN 62*   CREATININE 4.1*   CALCIUM 8.1*   PROT 6.6   ALBUMIN 2.5*   BILITOT 1.0   ALKPHOS 87   AST 59*   ALT 25   ANIONGAP 10   EGFRNONAA 14*     Troponin:   Recent Labs   Lab 06/17/20  0830   TROPONINI 0.139*     Urine Studies:   Recent Labs   Lab 06/17/20  1426   COLORU Yellow   APPEARANCEUA Clear   PHUR 6.0   SPECGRAV 1.010   PROTEINUA 1+*   GLUCUA Negative   KETONESU Negative   BILIRUBINUA Negative   OCCULTUA Trace*   NITRITE Negative   UROBILINOGEN Negative   LEUKOCYTESUR Negative   RBCUA 2   WBCUA 0   BACTERIA None   HYALINECASTS 0     All pertinent labs within the past 24 hours have been reviewed.    Significant Imaging: I have reviewed all pertinent imaging results/findings within the past 24  hours.      Assessment/Plan:      * COVID-19 virus infection  - COVID-19 testing - positive result  - Infection Control notified     - Isolation:   - Airborne, Contact and Droplet Precautions  - Cohort patients into COVID units  - N95 masks must be fit tested, wear eye protection  - 20 second hand hygiene              - Limit visitors per hospital policy              - Consolidating lab draws, nursing care, provider visits, and interventions    - Diagnostics: (leukopenia, hyponatremia, hyperferritinemia, elevated troponin, elevated d-dimer, age, and comorbidities are significant predictors of poor clinical outcome)  CBC, CMP, Ferritin, CRP, LDH, Blood Culture x2 and Portable CXR    - Management:  Supplemental O2 to maintain SpO2 >92%  Telemetry  Continuous/intermittent Pulse Ox  Albuterol treatment PRN  Doxycycline for infiltrate  Vitamin C, multi vitamin  Monitor labs every 48 hours  Advance Care Planning     Code Status - FULL CODE according to patient's wife      6/18  Symptomatic Care  Isolation  Discussed with Palliative care  Remdesivir appropriate. ID on consult          Acute kidney injury superimposed on CKD  Serum creatinine 4.1 (baseline 2.7)  Avoid nephrotoxic meds  Will hold Bumex and Metolazone for now as pt appears volume depleted  Gentle IV hydration  Hold ACE/ARB combination drug    6/18  Nephrology on consult  Hold Nephrotoxic meds        Sepsis  Criteria:  RR rate > 20, WBC 3.13  Lactic acid normal  B/P appropriate on arrival but did decline to 82/62 as pt is volume depleted  Blood cultures drawn  Normal saline 500 cc bolus given  Doxycyline given    6/18  Responding to gentle hydration  Monitor  Follow Cultures  ID        CKD (chronic kidney disease) stage 4, GFR 15-29 ml/min  Above baseline  Appears volume overload  Hold ACE/ARB  Gentle IV hydration    6/18  Nephrology  Monitor  Gentle Hydration  Hydralazine      Chronic combined systolic and diastolic congestive heart failure  EF 15 % and grade  II diastolic dysfunction  Compensated currently  Pt appears volume depleted likely from poor appetite secondary to viral illness  Will hold ACE/ARB, Bumex and metolazone  Gentle IV hydration  Monitor closely for S/S of volume overload    6/18  Gentle Hydration  PICC placement  Cardiology        Hypertension associated with diabetes  Borderline low blood pressure  Hold antihypertensives for now  Gentle IV hydration - spoke with Cardiology, Dr. Pantoja, who recommended gentle IV hydration        VTE Risk Mitigation (From admission, onward)         Ordered     IP VTE HIGH RISK PATIENT  Once      06/17/20 1147     Place sequential compression device  Until discontinued      06/17/20 1147                      Orlando Simpson MD  Department of Hospital Medicine   [facility]

## 2020-06-18 NOTE — CONSULTS
[facility]  Infectious Disease  Consult Note    Patient Name: Yan Choudhury  MRN: [id number]  Admission Date: 6/17/2020  Hospital Length of Stay: 1 days  Attending Physician: Orlando Simpson MD  Primary Care Provider: Sandra Estevez MD     Isolation Status: Airborne and Contact and Droplet    Patient information was obtained from patient, past medical records and ER records.      Consults  Assessment/Plan:     * COVID-19 virus infection  - COVID-19 testing   - Infection Control notified     - Isolation:   - Airborne, Contact and Droplet Precautions  - Cohort patients into COVID units              - Limit visitors per hospital policy              - Consolidating lab draws, nursing care, provider visits, and interventions      - Management:  Supplemental O2 to maintain SpO2 >92%  Continuous/intermittent Pulse Ox  Albuterol treatment PRN     Will continue supportive care , EF -15%, will be cautious with IVF or plasma transfusion, consider using Remdisivir   On empiric doxycycline     Acute kidney injury superimposed on CKD  Will avoid nephrotoxic meds . Continue close monitoring        Thank you for your consult. I will follow-up with patient. Please contact us if you have any additional questions.    Mihir Diaz MD  Infectious Disease  [facility]    Subjective:     Principal Problem: COVID-19 virus infection    HPI:  66 yo male with PMHx of combined CHF with EF 15 %, CKD III, DM II and ischemic cardiomyopathy who presents to the ED due to weakness.   Since admission , COVID assay is now positive .  Chest x-ray -   Slight increase in the the vascular markings suggested, a little greater at the right base.  Mild congestion  Serum ferritin is normal     Past Medical History:   Diagnosis Date    Arthritis     CAD (coronary artery disease)     Cataract     Chronic combined systolic and diastolic congestive heart failure 3/24/2015    CKD (chronic kidney disease), stage III     Diabetes  mellitus type II      AM    Diabetic retinopathy     anti Veg F injections for macular edema    Diverticulitis of colon     ED (erectile dysfunction)     Encounter for blood transfusion     Glaucoma     HTN (hypertension)     Hyperlipidemia     Ischemic cardiomyopathy     Obesity     JAHAIRA on CPAP     Pacemaker     Pulmonary HTN        Past Surgical History:   Procedure Laterality Date    CARDIAC CATHETERIZATION  2009    CHOLECYSTECTOMY      COLON SURGERY      Sigmoid resection    COLONOSCOPY N/A 10/11/2018    Procedure: COLONOSCOPY;  Surgeon: Quinn Aragon MD;  Location: [facility] ENDO;  Service: General;  Laterality: N/A;    EYE SURGERY      FLEXIBLE SIGMOIDOSCOPY N/A 1/18/2019    Procedure: SIGMOIDOSCOPY, FLEXIBLE;  Surgeon: Quinn Aragon MD;  Location: [facility] ENDO;  Service: General;  Laterality: N/A;    ILEOSTOMY N/A 10/30/2018    Procedure: CREATION, ILEOSTOMY;  Surgeon: Kevin Lindsay MD;  Location: [facility] OR;  Service: General;  Laterality: N/A;    Ileostomy reversal  01/2019    KNEE SURGERY      MOBILIZATION OF SPLENIC FLEXURE N/A 10/30/2018    Procedure: MOBILIZATION, SPLENIC FLEXURE;  Surgeon: Kevin Lindsay MD;  Location: [facility] OR;  Service: General;  Laterality: N/A;    REVISION COLOSTOMY N/A 10/30/2018    Procedure: REVISION OR CLOSURE, COLOSTOMY;  Surgeon: Kevin Lindsay MD;  Location: [facility] OR;  Service: General;  Laterality: N/A;  Parastomal Hernia Repair    RIGHT HEMICOLECTOMY Right 10/30/2018    Procedure: HEMICOLECTOMY, RIGHT;  Surgeon: Kevin Lindsay MD;  Location: [facility] OR;  Service: General;  Laterality: Right;       Review of patient's allergies indicates:  No Known Allergies    Medications:  Medications Prior to Admission   Medication Sig    albuterol (PROVENTIL/VENTOLIN HFA) 90 mcg/actuation inhaler Inhale 2 puffs into the lungs every 6 (six) hours as needed for Wheezing. Rescue    allopurinoL (ZYLOPRIM) 100 MG tablet Take 1 tablet (100 mg total) by  "mouth once daily.    aspirin (ECOTRIN) 81 MG EC tablet Take 81 mg by mouth every morning.     BD INSULIN SYRINGE ULTRA-FINE 0.5 mL 31 gauge x 5/16 Syrg USE AS DIRECTED TO INJECT INSULIN TWO TIMES DAILY    bimatoprost (LUMIGAN) 0.03 % ophthalmic drops Place 1 drop into the left eye every evening.    blood sugar diagnostic Strp 1 strip by Misc.(Non-Drug; Combo Route) route 4 (four) times daily. [facility]    brimonidine-timolol (COMBIGAN) 0.2-0.5 % Drop Place 1 drop into both eyes every 12 (twelve) hours.    bumetanide (BUMEX) 2 MG tablet TAKE 1 TABLET (2 MG TOTAL) BY MOUTH three times a day    carvediloL (COREG) 3.125 MG tablet Take 1 tablet (3.125 mg total) by mouth 2 (two) times daily with meals.    cholecalciferol, vitamin D3, (VITAMIN D3) 1,000 unit capsule Take 1,000 Units by mouth once daily.    dorzolamide-timolol 2-0.5% (COSOPT) 22.3-6.8 mg/mL ophthalmic solution Place 1 drop into both eyes 2 (two) times daily.    ENTRESTO 24-26 mg per tablet TAKE 1 TABLET BY MOUTH TWICE A DAY    hydrOXYzine HCL (ATARAX) 10 MG Tab Take 1 tablet (10 mg total) by mouth daily as needed.    insulin glargine (LANTUS U-100 INSULIN) 100 unit/mL injection INJECT 20 UNITS ONCE DAILY AS NEEDED WHEN BS< 150    IRON/VITAMIN B COMPLEX (GERITOL ORAL) Take by mouth.    latanoprost (XALATAN) 0.005 % ophthalmic solution Place 1 drop into both eyes once daily.    metOLazone (ZAROXOLYN) 5 MG tablet Take 1 tablet (5 mg total) by mouth once daily.    multivit-min/folic/vit K/lycop (MEN'S 50 PLUS MULTIVITAMIN ORAL) Take by mouth.    pantoprazole (PROTONIX) 40 MG tablet TAKE 1 TABLET BY MOUTH EVERY DAY FOR ACID REFLUX    simvastatin (ZOCOR) 10 MG tablet Take 1 tablet (10 mg total) by mouth every evening.    ketoconazole (NIZORAL) 2 % cream AAA bid prn rash of groin. For Flares.    lancets Misc For tel care    pen needle, diabetic (BD ULTRA-FINE JOSLYN PEN NEEDLE) 32 gauge x 5/32" Ndle 1 each by Misc.(Non-Drug; Combo Route) route 4 " (four) times daily.     Antibiotics (From admission, onward)    Start     Stop Route Frequency Ordered    06/17/20 1200  doxycycline tablet 100 mg      -- Oral Every 12 hours 06/17/20 1148        Antifungals (From admission, onward)    None        Antivirals (From admission, onward)    None           Immunization History   Administered Date(s) Administered    Influenza - High Dose - PF (65 years and older) 10/08/2019    Influenza - Quadrivalent 10/02/2014, 11/17/2016    Influenza - Quadrivalent - PF (6 months and older) 10/16/2015, 11/02/2017    Influenza Split 11/04/2003, 10/05/2007, 10/07/2009, 11/19/2010, 01/22/2013    Pneumococcal Conjugate - 13 Valent 06/27/2018    Pneumococcal Polysaccharide - 23 Valent 11/13/2002, 02/05/2014, 04/08/2019    Tdap 10/16/2015       Family History     Problem Relation (Age of Onset)    Cancer Mother    Heart disease Father    No Known Problems Sister, Brother, Maternal Aunt, Maternal Uncle, Paternal Aunt, Paternal Uncle, Maternal Grandmother, Maternal Grandfather, Paternal Grandmother, Paternal Grandfather    Stroke Father        Social History     Socioeconomic History    Marital status:      Spouse name: Not on file    Number of children: 2    Years of education: Not on file    Highest education level: Not on file   Occupational History    Occupation: [employer]     Employer: [employer]   Social Needs    Financial resource strain: Not on file    Food insecurity     Worry: Not on file     Inability: Not on file    Transportation needs     Medical: Not on file     Non-medical: Not on file   Tobacco Use    Smoking status: Never Smoker    Smokeless tobacco: Never Used   Substance and Sexual Activity    Alcohol use: No     Alcohol/week: 0.0 standard drinks     Frequency: Never    Drug use: No    Sexual activity: Not Currently   Lifestyle    Physical activity     Days per week: Not on file     Minutes per session: Not on file    Stress: Not  on file   Relationships    Social connections     Talks on phone: Not on file     Gets together: Not on file     Attends Alevism service: Not on file     Active member of club or organization: Not on file     Attends meetings of clubs or organizations: Not on file     Relationship status: Not on file   Other Topics Concern    Not on file   Social History Narrative    . Lives with spouse. Has 2 children. 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     Review of Systems  Objective:     Vital Signs (Most Recent):  Temp: 99.1 °F (37.3 °C) (06/18/20 0435)  Pulse: 93 (06/18/20 0500)  Resp: 18 (06/18/20 0500)  BP: 119/60 (06/18/20 0435)  SpO2: 96 % (06/18/20 0500) Vital Signs (24h Range):  Temp:  [97 °F (36.1 °C)-99.5 °F (37.5 °C)] 99.1 °F (37.3 °C)  Pulse:  [] 93  Resp:  [15-28] 18  SpO2:  [95 %-100 %] 96 %  BP: ()/(53-72) 119/60     Weight: 84.9 kg (187 lb 1.6 oz)  Body mass index is 28.45 kg/m².    Estimated Creatinine Clearance: 18.5 mL/min (A) (based on SCr of 4.1 mg/dL (H)).    Physical Exam  Vitals signs and nursing note reviewed.     PE as per primary team    Significant Labs:   Blood Culture:   Recent Labs   Lab 06/17/20  1220 06/17/20  1222   LABBLOO No Growth to date No Growth to date     BMP:   Recent Labs   Lab 06/17/20  0830   *   *   K 3.7   CL 99   CO2 22*   BUN 62*   CREATININE 4.1*   CALCIUM 8.1*     CBC:   Recent Labs   Lab 06/17/20  0830   WBC 3.13*   HGB 12.6*   HCT 40.5   *     All pertinent labs within the past 24 hours have been reviewed.    Significant Imaging: I have reviewed all pertinent imaging results/findings within the past 24 hours.

## 2020-06-18 NOTE — PLAN OF CARE
Treatment ongoing.  Pt is alert; occasionally disoriented to time.   VSS, v-paced on tele monitor.  Pt remained afebrile.  O2 sats wnl on 2L NC.  AC/HS blood glucose monitoring; iss per mar.  Nephrology consulted this shift. Pending PICC placement.   Pt had no c/o pain.  Pt free from falls; safety precautions in place.  Frequent weight shifting encouraged; pt able to reposition self independently.   Plan of care reviewed with pt; he verbalized understanding.  Pt currently resting comfortably in bed.  Hourly rounding complete.  Will continue to monitor.

## 2020-06-18 NOTE — ASSESSMENT & PLAN NOTE
- COVID-19 testing - positive result  - Infection Control notified     - Isolation:   - Airborne, Contact and Droplet Precautions  - Cohort patients into COVID units  - N95 masks must be fit tested, wear eye protection  - 20 second hand hygiene              - Limit visitors per hospital policy              - Consolidating lab draws, nursing care, provider visits, and interventions    - Diagnostics: (leukopenia, hyponatremia, hyperferritinemia, elevated troponin, elevated d-dimer, age, and comorbidities are significant predictors of poor clinical outcome)  CBC, CMP, Ferritin, CRP, LDH, Blood Culture x2 and Portable CXR    - Management:  Supplemental O2 to maintain SpO2 >92%  Telemetry  Continuous/intermittent Pulse Ox  Albuterol treatment PRN  Doxycycline for infiltrate  Vitamin C, multi vitamin  Monitor labs every 48 hours  Advance Care Planning     Code Status - FULL CODE according to patient's wife       6/18  Symptomatic Care  Isolation  Discussed with Palliative care  Remdesivir appropriate. ID on consult

## 2020-06-19 LAB
ALBUMIN SERPL BCP-MCNC: 2.2 G/DL (ref 3.5–5.2)
ALLENS TEST: ABNORMAL
ALP SERPL-CCNC: 95 U/L (ref 55–135)
ALT SERPL W/O P-5'-P-CCNC: 97 U/L (ref 10–44)
ANION GAP SERPL CALC-SCNC: 10 MMOL/L (ref 8–16)
ANISOCYTOSIS BLD QL SMEAR: SLIGHT
AST SERPL-CCNC: 226 U/L (ref 10–40)
BASOPHILS # BLD AUTO: 0.02 K/UL (ref 0–0.2)
BASOPHILS NFR BLD: 0.7 % (ref 0–1.9)
BILIRUB SERPL-MCNC: 1 MG/DL (ref 0.1–1)
BUN SERPL-MCNC: 65 MG/DL (ref 8–23)
CALCIUM SERPL-MCNC: 8.2 MG/DL (ref 8.7–10.5)
CHLORIDE SERPL-SCNC: 108 MMOL/L (ref 95–110)
CO2 SERPL-SCNC: 20 MMOL/L (ref 23–29)
CREAT SERPL-MCNC: 3.4 MG/DL (ref 0.5–1.4)
CRP SERPL-MCNC: 73.5 MG/L (ref 0–8.2)
DACRYOCYTES BLD QL SMEAR: ABNORMAL
DELSYS: ABNORMAL
DIFFERENTIAL METHOD: ABNORMAL
EOSINOPHIL # BLD AUTO: 0 K/UL (ref 0–0.5)
EOSINOPHIL NFR BLD: 0.7 % (ref 0–8)
ERYTHROCYTE [DISTWIDTH] IN BLOOD BY AUTOMATED COUNT: 20.3 % (ref 11.5–14.5)
EST. GFR  (AFRICAN AMERICAN): 20 ML/MIN/1.73 M^2
EST. GFR  (NON AFRICAN AMERICAN): 18 ML/MIN/1.73 M^2
FERRITIN SERPL-MCNC: 186 NG/ML (ref 20–300)
FIO2: 28
FLOW: 2
GLUCOSE SERPL-MCNC: 103 MG/DL (ref 70–110)
GLUCOSE SERPL-MCNC: 77 MG/DL (ref 70–110)
HCO3 UR-SCNC: 21.1 MMOL/L (ref 24–28)
HCT VFR BLD AUTO: 39.2 % (ref 40–54)
HCT VFR BLD CALC: 41 %PCV (ref 36–54)
HGB BLD-MCNC: 12.2 G/DL (ref 14–18)
HYPOCHROMIA BLD QL SMEAR: ABNORMAL
IMM GRANULOCYTES # BLD AUTO: 0 K/UL (ref 0–0.04)
IMM GRANULOCYTES NFR BLD AUTO: 0 % (ref 0–0.5)
LYMPHOCYTES # BLD AUTO: 1.3 K/UL (ref 1–4.8)
LYMPHOCYTES NFR BLD: 44 % (ref 18–48)
MAGNESIUM SERPL-MCNC: 1.5 MG/DL (ref 1.6–2.6)
MCH RBC QN AUTO: 24.2 PG (ref 27–31)
MCHC RBC AUTO-ENTMCNC: 31.1 G/DL (ref 32–36)
MCV RBC AUTO: 78 FL (ref 82–98)
MODE: ABNORMAL
MONOCYTES # BLD AUTO: 0.3 K/UL (ref 0.3–1)
MONOCYTES NFR BLD: 10.7 % (ref 4–15)
NEUTROPHILS # BLD AUTO: 1.3 K/UL (ref 1.8–7.7)
NEUTROPHILS NFR BLD: 43.9 % (ref 38–73)
NRBC BLD-RTO: 0 /100 WBC
OVALOCYTES BLD QL SMEAR: ABNORMAL
PCO2 BLDA: 36.5 MMHG (ref 35–45)
PH SMN: 7.37 [PH] (ref 7.35–7.45)
PHOSPHATE SERPL-MCNC: 3.9 MG/DL (ref 2.7–4.5)
PLATELET # BLD AUTO: 114 K/UL (ref 150–350)
PLATELET BLD QL SMEAR: ABNORMAL
PMV BLD AUTO: ABNORMAL FL (ref 9.2–12.9)
PO2 BLDA: 32 MMHG (ref 40–60)
POC BE: -4 MMOL/L
POC IONIZED CALCIUM: 1.24 MMOL/L (ref 1.06–1.42)
POC SATURATED O2: 61 % (ref 95–100)
POCT GLUCOSE: 102 MG/DL (ref 70–110)
POCT GLUCOSE: 111 MG/DL (ref 70–110)
POCT GLUCOSE: 154 MG/DL (ref 70–110)
POCT GLUCOSE: 78 MG/DL (ref 70–110)
POIKILOCYTOSIS BLD QL SMEAR: SLIGHT
POLYCHROMASIA BLD QL SMEAR: ABNORMAL
POTASSIUM BLD-SCNC: 3.5 MMOL/L (ref 3.5–5.1)
POTASSIUM SERPL-SCNC: 3.6 MMOL/L (ref 3.5–5.1)
PROT SERPL-MCNC: 6 G/DL (ref 6–8.4)
RBC # BLD AUTO: 5.05 M/UL (ref 4.6–6.2)
SAMPLE: ABNORMAL
SITE: ABNORMAL
SODIUM BLD-SCNC: 138 MMOL/L (ref 136–145)
SODIUM SERPL-SCNC: 138 MMOL/L (ref 136–145)
TARGETS BLD QL SMEAR: ABNORMAL
WBC # BLD AUTO: 3 K/UL (ref 3.9–12.7)

## 2020-06-19 PROCEDURE — C9399 UNCLASSIFIED DRUGS OR BIOLOG: HCPCS | Performed by: NURSE PRACTITIONER

## 2020-06-19 PROCEDURE — 99900035 HC TECH TIME PER 15 MIN (STAT)

## 2020-06-19 PROCEDURE — 86140 C-REACTIVE PROTEIN: CPT

## 2020-06-19 PROCEDURE — 82803 BLOOD GASES ANY COMBINATION: CPT

## 2020-06-19 PROCEDURE — 63600175 PHARM REV CODE 636 W HCPCS: Performed by: HOSPITALIST

## 2020-06-19 PROCEDURE — 99233 SBSQ HOSP IP/OBS HIGH 50: CPT | Mod: ,,, | Performed by: INTERNAL MEDICINE

## 2020-06-19 PROCEDURE — 84295 ASSAY OF SERUM SODIUM: CPT

## 2020-06-19 PROCEDURE — 82728 ASSAY OF FERRITIN: CPT

## 2020-06-19 PROCEDURE — 99233 PR SUBSEQUENT HOSPITAL CARE,LEVL III: ICD-10-PCS | Mod: ,,, | Performed by: INTERNAL MEDICINE

## 2020-06-19 PROCEDURE — 25000003 PHARM REV CODE 250: Performed by: HOSPITALIST

## 2020-06-19 PROCEDURE — 25000003 PHARM REV CODE 250: Performed by: NURSE PRACTITIONER

## 2020-06-19 PROCEDURE — 84132 ASSAY OF SERUM POTASSIUM: CPT

## 2020-06-19 PROCEDURE — 27000221 HC OXYGEN, UP TO 24 HOURS

## 2020-06-19 PROCEDURE — 83735 ASSAY OF MAGNESIUM: CPT

## 2020-06-19 PROCEDURE — 63600175 PHARM REV CODE 636 W HCPCS: Performed by: NURSE PRACTITIONER

## 2020-06-19 PROCEDURE — 85014 HEMATOCRIT: CPT

## 2020-06-19 PROCEDURE — 94640 AIRWAY INHALATION TREATMENT: CPT

## 2020-06-19 PROCEDURE — 21400001 HC TELEMETRY ROOM

## 2020-06-19 PROCEDURE — 25000003 PHARM REV CODE 250: Performed by: INTERNAL MEDICINE

## 2020-06-19 PROCEDURE — 85025 COMPLETE CBC W/AUTO DIFF WBC: CPT

## 2020-06-19 PROCEDURE — 94761 N-INVAS EAR/PLS OXIMETRY MLT: CPT

## 2020-06-19 PROCEDURE — 80053 COMPREHEN METABOLIC PANEL: CPT

## 2020-06-19 PROCEDURE — 82330 ASSAY OF CALCIUM: CPT

## 2020-06-19 PROCEDURE — 84100 ASSAY OF PHOSPHORUS: CPT

## 2020-06-19 RX ORDER — BUMETANIDE 0.5 MG/1
0.5 TABLET ORAL DAILY
Status: DISCONTINUED | OUTPATIENT
Start: 2020-06-20 | End: 2020-06-19

## 2020-06-19 RX ORDER — BUMETANIDE 1 MG/1
1 TABLET ORAL 2 TIMES DAILY
Status: DISCONTINUED | OUTPATIENT
Start: 2020-06-19 | End: 2020-06-20

## 2020-06-19 RX ORDER — CEFEPIME HYDROCHLORIDE 1 G/50ML
1 INJECTION, SOLUTION INTRAVENOUS
Status: DISCONTINUED | OUTPATIENT
Start: 2020-06-19 | End: 2020-06-20

## 2020-06-19 RX ORDER — LANOLIN ALCOHOL/MO/W.PET/CERES
400 CREAM (GRAM) TOPICAL ONCE
Status: COMPLETED | OUTPATIENT
Start: 2020-06-19 | End: 2020-06-19

## 2020-06-19 RX ADMIN — DORZOLAMIDE HYDROCHLORIDE AND TIMOLOL MALEATE 1 DROP: 20; 5 SOLUTION/ DROPS OPHTHALMIC at 10:06

## 2020-06-19 RX ADMIN — BUMETANIDE 1 MG: 1 TABLET ORAL at 09:06

## 2020-06-19 RX ADMIN — LATANOPROST 1 DROP: 50 SOLUTION OPHTHALMIC at 08:06

## 2020-06-19 RX ADMIN — INSULIN DETEMIR 10 UNITS: 100 INJECTION, SOLUTION SUBCUTANEOUS at 09:06

## 2020-06-19 RX ADMIN — ASPIRIN 81 MG: 81 TABLET, COATED ORAL at 08:06

## 2020-06-19 RX ADMIN — DORZOLAMIDE HYDROCHLORIDE AND TIMOLOL MALEATE 1 DROP: 20; 5 SOLUTION/ DROPS OPHTHALMIC at 09:06

## 2020-06-19 RX ADMIN — CARVEDILOL 3.12 MG: 3.12 TABLET, FILM COATED ORAL at 08:06

## 2020-06-19 RX ADMIN — ISOSORBIDE DINITRATE 10 MG: 10 TABLET ORAL at 09:06

## 2020-06-19 RX ADMIN — HYDRALAZINE HYDROCHLORIDE 25 MG: 25 TABLET, FILM COATED ORAL at 09:06

## 2020-06-19 RX ADMIN — ALBUTEROL SULFATE 2 PUFF: 90 AEROSOL, METERED RESPIRATORY (INHALATION) at 12:06

## 2020-06-19 RX ADMIN — ALBUTEROL SULFATE 2 PUFF: 90 AEROSOL, METERED RESPIRATORY (INHALATION) at 04:06

## 2020-06-19 RX ADMIN — CEFEPIME HYDROCHLORIDE 1 G: 1 INJECTION, SOLUTION INTRAVENOUS at 10:06

## 2020-06-19 RX ADMIN — ALBUTEROL SULFATE 2 PUFF: 90 AEROSOL, METERED RESPIRATORY (INHALATION) at 08:06

## 2020-06-19 RX ADMIN — PANTOPRAZOLE SODIUM 40 MG: 40 TABLET, DELAYED RELEASE ORAL at 08:06

## 2020-06-19 RX ADMIN — BUMETANIDE 0.5 MG: 0.5 TABLET ORAL at 08:06

## 2020-06-19 RX ADMIN — Medication 400 MG: at 11:06

## 2020-06-19 RX ADMIN — VANCOMYCIN HYDROCHLORIDE 1750 MG: 750 INJECTION, POWDER, LYOPHILIZED, FOR SOLUTION INTRAVENOUS at 10:06

## 2020-06-19 RX ADMIN — ALBUTEROL SULFATE 2 PUFF: 90 AEROSOL, METERED RESPIRATORY (INHALATION) at 11:06

## 2020-06-19 RX ADMIN — DOXYCYCLINE HYCLATE 100 MG: 100 TABLET, COATED ORAL at 08:06

## 2020-06-19 RX ADMIN — INSULIN ASPART 2 UNITS: 100 INJECTION, SOLUTION INTRAVENOUS; SUBCUTANEOUS at 11:06

## 2020-06-19 RX ADMIN — BUMETANIDE 1 MG: 1 TABLET ORAL at 01:06

## 2020-06-19 RX ADMIN — DOXYCYCLINE HYCLATE 100 MG: 100 TABLET, COATED ORAL at 09:06

## 2020-06-19 RX ADMIN — THERA TABS 1 TABLET: TAB at 08:06

## 2020-06-19 NOTE — PLAN OF CARE
Care plan reviewed with patient. Able to get up to the bathroom with assistance. Free from falls. Still on oxygen. Able to take some rest at long intervals. With occasional cough and mild SOB. No other complaints made.

## 2020-06-19 NOTE — PROGRESS NOTES
[facility] -   Nephrology  Progress Note    Patient Name: Yan Choudhury  MRN: [id number]  Admission Date: 6/17/2020  Hospital Length of Stay: 2 days  Attending Provider: Hernandez Rubio MD   Primary Care Physician: Sandra Estevez MD  Principal Problem:COVID-19 virus infection    Subjective:     HPI: Pt was seen and examined. H/o and chart reviewed. Pt is a 66 y/o male with a h/o of CKD stage 3, severe combined diastolic and systolic CHF (EF 15%), DM, HTN, and obesity who presented with weakness and SOB. Pt was admitted for COVID-19 infection. Renal was consulted because s Cr has worsened from baseline of about 2.5 to 4.1, repeat today 3.2. Labs, meds, mgmt (both inpt and outpt) reviewed. Pt was on bumex 2 mg po tid and metolazone 5 mg po qd. No GI losses reported, denies NSAIds, no abx,, no recent infections. No other pertinent issues. Currently, he denies SOB, has no leg swelling. All the diuretics are on hold.    Interval History:  Creatinine is 3.4 which is unchanged from yesterday.  Hold IV fluids.  Increase Bumex today    Review of patient's allergies indicates:  No Known Allergies  Current Facility-Administered Medications   Medication Frequency    acetaminophen tablet 650 mg Q4H PRN    albuterol inhaler 2 puff Q8H    aspirin EC tablet 81 mg QAM    bumetanide tablet 1 mg BID    carvediloL tablet 3.125 mg BID WM    cefepime in dextrose 5 % 1 gram/50 mL IVPB 1 g Q24H    dextrose 50% injection 12.5 g PRN    dextrose 50% injection 25 g PRN    dorzolamide-timolol 2-0.5% ophthalmic solution 1 drop BID    doxycycline tablet 100 mg Q12H    glucagon (human recombinant) injection 1 mg PRN    glucose chewable tablet 16 g PRN    glucose chewable tablet 24 g PRN    hydrALAZINE tablet 25 mg Q8H    insulin aspart U-100 pen 1-10 Units QID (AC + HS) PRN    insulin detemir U-100 pen 10 Units QHS    isosorbide dinitrate tablet 10 mg TID    latanoprost 0.005 % ophthalmic solution 1 drop Daily     multivitamin tablet Daily    ondansetron injection 4 mg Q8H PRN    pantoprazole EC tablet 40 mg Daily    sodium chloride 0.9% flush 10 mL PRN    vancomycin - pharmacy to dose pharmacy to manage frequency       Objective:     Vital Signs (Most Recent):  Temp: 97.6 °F (36.4 °C) (06/19/20 1525)  Pulse: 82 (06/19/20 1723)  Resp: 16 (06/19/20 1656)  BP: 99/60 (06/19/20 1525)  SpO2: 97 % (06/19/20 1656)  O2 Device (Oxygen Therapy): room air (06/19/20 1656) Vital Signs (24h Range):  Temp:  [97.6 °F (36.4 °C)-100.9 °F (38.3 °C)] 97.6 °F (36.4 °C)  Pulse:  [60-91] 82  Resp:  [16-22] 16  SpO2:  [95 %-98 %] 97 %  BP: ()/(59-70) 99/60     Weight: 85.1 kg (187 lb 9.8 oz) (06/19/20 0042)  Body mass index is 28.53 kg/m².  Body surface area is 2.02 meters squared.    I/O last 3 completed shifts:  In: 1160 [P.O.:1160]  Out: 312 [Urine:311; Stool:1]    Physical Exam  Vitals signs and nursing note reviewed.   Constitutional:       General: He is not in acute distress.     Appearance: He is well-developed. He is not ill-appearing.   HENT:      Head: Normocephalic and atraumatic.      Nose: Nose normal.   Eyes:      General: No scleral icterus.     Conjunctiva/sclera: Conjunctivae normal.      Pupils: Pupils are equal, round, and reactive to light.   Neck:      Musculoskeletal: Normal range of motion and neck supple.      Vascular: JVD present.   Cardiovascular:      Rate and Rhythm: Normal rate and regular rhythm.      Heart sounds: Normal heart sounds. No murmur. No friction rub. No gallop.    Pulmonary:      Effort: Tachypnea present.      Breath sounds: Decreased breath sounds present.   Abdominal:      General: Bowel sounds are normal.      Palpations: Abdomen is soft.   Musculoskeletal: Normal range of motion.         General: No tenderness.   Skin:     General: Skin is warm and dry.   Neurological:      Mental Status: He is alert and oriented to person, place, and time.   Psychiatric:         Behavior: Behavior normal.  "        Significant Labs:  All labs within the past 24 hours have been reviewed.     Significant Imaging:      Assessment/Plan:     Acute kidney injury superimposed on CKD  BROOKE on CKD stage 4.  BROOKE is due to prerenal azotemia from the use of large dose diuretics (bumex 2 mg tid and metolazone 5 mg qd)  s Cr change that occurs as a result of prerenal azotemia (for example, as result of diuretics use) does not pertain to GFR estimation, because pt would not be at steady state    Evidence for above is that s Cr has already improved (4.1 to 3.2) on diuretics withdrawal    Recommend a "happy medium", that is to say a dose of diuretics that will keep fluid status stable without causing prerenal azotemia.  Of course, pt himself can help by reducing all salt and fluid XS in diet so that his dependence of diuretics will be reduced (I had advised him in Dec 2019 and Jab 2020)    This pt clearly has very severe CHF and can not stay off diuretics for too long.  Recommend already restart bumed at 1 mg po bid  Keep metolazone off for now.    No indications for acute or chronic HD at present.      6/19/20: Creatinine is 3.4 which is unchanged from yesterday.  Hold IV fluids.  Increase Bumex today          Thank you for your consult.     Sandoval Damico MD  Nephrology  [facility]  "

## 2020-06-19 NOTE — PROCEDURES
"Yan Choudhury is a 65 y.o. male patient.    Temp: 99.8 °F (37.7 °C) (06/18/20 1521)  Pulse: 86 (06/18/20 1750)  Resp: 20 (06/18/20 1533)  BP: 105/68 (06/18/20 1521)  SpO2: 95 % (06/18/20 1533)  Weight: 85 kg (187 lb 6.3 oz) (06/18/20 0600)  Height: 5' 8" (172.7 cm) (06/17/20 0812)    PICC  Date/Time: 6/18/2020 8:00 PM  Performed by: Carl Phillips RN  Consent Done: Yes  Time out: Immediately prior to procedure a time out was called to verify the correct patient, procedure, equipment, support staff and site/side marked as required  Indications: med administration and vascular access  Anesthesia: local infiltration  Local anesthetic: lidocaine 1% without epinephrine  Anesthetic Total (mL): 2  Preparation: skin prepped with chlorhexidine (without alcohol)  Skin prep agent dried: skin prep agent completely dried prior to procedure  Sterile barriers: all five maximum sterile barriers used - cap, mask, sterile gown, sterile gloves, and large sterile sheet  Hand hygiene: hand hygiene performed prior to central venous catheter insertion  Location details: right basilic  Catheter type: double lumen  Catheter size: 5 Fr  Catheter Length: 34cm    Ultrasound guidance: yes  Vessel Caliber: small and patent, compressibility normal  Needle advanced into vessel with real time Ultrasound guidance.  Guidewire confirmed in vessel.  Sterile sheath used.  Number of attempts: 1  Post-procedure: blood return through all ports, chlorhexidine patch and sterile dressing applied  Estimated blood loss (mL): 0  Specimens: No  Implants: No  Assessment: free fluid flow  Comments: Pending xray verification           Carl Phillips  6/18/2020    "

## 2020-06-19 NOTE — PROGRESS NOTES
Pharmacokinetic Initial Assessment: IV Vancomycin    Assessment/Plan:    Initiate intravenous vancomycin with loading dose of 1750 mg once with subsequent doses when random concentrations are less than 20 mcg/mL. Desired empiric serum trough concentration is 15 to 20 mcg/mL.    Draw vancomycin random level on 6/20 at 1000.    Pharmacy will continue to follow and monitor vancomycin.      Please contact pharmacy at extension [phone] with any questions regarding this assessment.     Thank you for the consult,   Alpesh Goemz PharmD 6/19/2020 11:15 AM         Patient brief summary:  Yan Choudhury is a 65 y.o. male initiated on antimicrobial therapy with IV Vancomycin for treatment of suspected lower respiratory infection    Drug Allergies:   Review of patient's allergies indicates:  No Known Allergies    Actual Body Weight:   85.1 kg    Renal Function:   Estimated Creatinine Clearance: 22.4 mL/min (A) (based on SCr of 3.4 mg/dL (H)).,     Dialysis Method (if applicable):  Not on dialysis    CBC (last 72 hours):  Recent Labs   Lab Result Units 06/17/20  0830 06/18/20  1440 06/19/20  0507   WBC K/uL 3.13* 3.68* 3.00*   Hemoglobin g/dL 12.6* 12.8* 12.2*   Hematocrit % 40.5 40.6 39.2*   Platelets K/uL 103* 116* 114*   Gran% % 49.3 60.9 43.9   Lymph% % 36.4 29.3 44.0   Mono% % 13.1 8.7 10.7   Eosinophil% % 0.6 0.5 0.7   Basophil% % 0.3 0.3 0.7   Differential Method  Automated Automated Automated       Metabolic Panel (last 72 hours):  Recent Labs   Lab Result Units 06/17/20  0830 06/17/20  1426 06/18/20  1440 06/19/20  0507   Sodium mmol/L 131*  --  136 138   Potassium mmol/L 3.7  --  3.6 3.6   Chloride mmol/L 99  --  106 108   CO2 mmol/L 22*  --  16* 20*   Glucose mg/dL 119*  --  139* 77   Glucose, UA   --  Negative  --   --    BUN, Bld mg/dL 62*  --  59* 65*   Creatinine mg/dL 4.1*  --  3.2* 3.4*   Albumin g/dL 2.5*  --   --  2.2*   Total Bilirubin mg/dL 1.0  --   --  1.0   Alkaline Phosphatase U/L 87  --   --  95   AST U/L 59*   --   --  226*   ALT U/L 25  --   --  97*   Magnesium mg/dL  --   --   --  1.5*   Phosphorus mg/dL  --   --   --  3.9       Drug levels (last 3 results):  No results for input(s): VANCOMYCINRA, VANCOMYCINPE, VANCOMYCINTR in the last 72 hours.    Microbiologic Results:  Microbiology Results (last 7 days)       Procedure Component Value Units Date/Time    Blood culture (site 1) [[id number]] Collected: 06/17/20 1220    Order Status: Completed Specimen: Blood from Peripheral, Forearm, Left Updated: 06/18/20 2212     Blood Culture, Routine No Growth to date      No Growth to date    Narrative:      Site # 1, aerobic and anaerobic    Blood culture (site 2) [[id number]] Collected: 06/17/20 1222    Order Status: Completed Specimen: Blood from Peripheral, Antecubital, Right Updated: 06/18/20 2212     Blood Culture, Routine No Growth to date      No Growth to date    Narrative:      Site # 2, aerobic only

## 2020-06-19 NOTE — PROGRESS NOTES
Pharmacist Renal Dose Adjustment Note    Yan Choudhury is a 65 y.o. male being treated with the medication cefepime.    Patient Data:    Vital Signs (Most Recent):  Temp: 98.5 °F (36.9 °C) (06/19/20 0429)  Pulse: 66 (06/19/20 0813)  Resp: 17 (06/19/20 0813)  BP: 97/63 (06/19/20 0429)  SpO2: 96 % (06/19/20 0813) Vital Signs (72h Range):  Temp:  [97 °F (36.1 °C)-100.9 °F (38.3 °C)]   Pulse:  []   Resp:  [15-28]   BP: ()/(53-72)   SpO2:  [93 %-100 %]      Recent Labs   Lab 06/17/20  0830 06/18/20  1440 06/19/20  0507   CREATININE 4.1* 3.2* 3.4*     Serum creatinine: 3.4 mg/dL (H) 06/19/20 0507  Estimated creatinine clearance: 22.4 mL/min (A)    Cefepime 1 g IV q12 h has been changed to 1 g IV q24 h based on the renal dose adjustment protocol.    Pharmacist's Name: Alpesh Gomez PharmD 6/19/2020 8:21 AM    Pharmacist's Extension: [phone]

## 2020-06-19 NOTE — ASSESSMENT & PLAN NOTE
"BROOKE on CKD stage 4.  BROOKE is due to prerenal azotemia from the use of large dose diuretics (bumex 2 mg tid and metolazone 5 mg qd)  s Cr change that occurs as a result of prerenal azotemia (for example, as result of diuretics use) does not pertain to GFR estimation, because pt would not be at steady state    Evidence for above is that s Cr has already improved (4.1 to 3.2) on diuretics withdrawal    Recommend a "happy medium", that is to say a dose of diuretics that will keep fluid status stable without causing prerenal azotemia.  Of course, pt himself can help by reducing all salt and fluid XS in diet so that his dependence of diuretics will be reduced (I had advised him in Dec 2019 and Jab 2020)    This pt clearly has very severe CHF and can not stay off diuretics for too long.  Recommend already restart bumed at 1 mg po bid  Keep metolazone off for now.    No indications for acute or chronic HD at present.      6/19/20: Creatinine is 3.4 which is unchanged from yesterday.  Hold IV fluids.  Increase Bumex today    "

## 2020-06-19 NOTE — PROGRESS NOTES
[facility] Hospital Medicine  Progress Note    Patient Name: Yan Choudhury  MRN: [id number]  Patient Class: IP- Inpatient   Admission Date: 6/17/2020  Length of Stay: 2 days  Attending Physician: Hernandez Rubio MD  Primary Care Provider: Sandra Estevez MD        Subjective:     Principal Problem:COVID-19 virus infection        HPI:  Pt is a 64 yo male with PMHx of combined CHF with EF 15 %, CKD III, DM II and ischemic cardiomyopathy who presents to the ED due to weakness. Wife provides history as patient is a difficult historian. Wife explains pt has had a decreased appetite with increasing weakness onset for approx 6 days. Wife says that Cardiology mentioned possible dehydration. Wife says that pt has not been eating or drinking regularly with increased weakness, walking slow and increased fatigue. While in the ED, nurse states that pt was tachypnic with respiratory rate 26.   On arrival: Temp 97, pulse 74, resp 22, B/P 99/56 and pulse ox 95 on RA.   CXR - Slight increase in the the vascular markings suggested, a little greater at the right base.  Mild congestion?  No overt failure.    BNP = 1874 (last admission 2300)  Labs find WBC 3.13, Na 131, serum creatinine 4.1 (baseline 2.7), glucose 119  PT IS COVID +  Pt has received a saline bolus 250 cc in the ED. Afterwards, blood pressure noted 82/62 and a second saline bolus of 250 cc given.       Overview/Hospital Course:  6/18  Patient states his breathing has improved. Renal function worsened from prior study - Nephrology has been consulted. Discussed with Cardiology. Patient will have PICC placed to measure Cardiac Output. Patient a candidate for Remdesivir pending ID review and recommendation. Patient reports SOB + Cough.     Interval History: No events overnight. Neck veins not prominent at 45. Svo2 61. Calculated cardiac output 5.2.     Review of Systems  Objective:     Vital Signs (Most Recent):  Temp: 97.9 °F (36.6 °C) (06/19/20 1107)  Pulse:  80 (06/19/20 1107)  Resp: 20 (06/19/20 1107)  BP: 106/66 (06/19/20 1107)  SpO2: 98 % (06/19/20 1107) Vital Signs (24h Range):  Temp:  [97.7 °F (36.5 °C)-100.9 °F (38.3 °C)] 97.9 °F (36.6 °C)  Pulse:  [60-88] 80  Resp:  [16-20] 20  SpO2:  [93 %-98 %] 98 %  BP: ()/(59-70) 106/66     Weight: 85.1 kg (187 lb 9.8 oz)  Body mass index is 28.53 kg/m².    Intake/Output Summary (Last 24 hours) at 6/19/2020 1132  Last data filed at 6/19/2020 0600  Gross per 24 hour   Intake 920 ml   Output 312 ml   Net 608 ml      Physical Exam  Constitutional:       Appearance: Normal appearance.   HENT:      Head: Normocephalic and atraumatic.      Nose: Nose normal.   Eyes:      Extraocular Movements: Extraocular movements intact.      Conjunctiva/sclera: Conjunctivae normal.      Pupils: Pupils are equal, round, and reactive to light.   Neck:      Musculoskeletal: Normal range of motion and neck supple.   Cardiovascular:      Rate and Rhythm: Normal rate and regular rhythm.      Pulses: Normal pulses.      Heart sounds: Normal heart sounds.   Pulmonary:      Effort: Pulmonary effort is normal.      Breath sounds: Normal breath sounds.   Abdominal:      General: Abdomen is flat. Bowel sounds are normal.      Palpations: Abdomen is soft.   Musculoskeletal: Normal range of motion.   Skin:     General: Skin is warm and dry.   Neurological:      General: No focal deficit present.      Mental Status: He is alert and oriented to person, place, and time.         Significant Labs:   BMP:   Recent Labs   Lab 06/19/20  0507   GLU 77      K 3.6      CO2 20*   BUN 65*   CREATININE 3.4*   CALCIUM 8.2*   MG 1.5*     CBC:   Recent Labs   Lab 06/18/20  1440 06/19/20  0507 06/19/20  0908   WBC 3.68* 3.00*  --    HGB 12.8* 12.2*  --    HCT 40.6 39.2* 41   * 114*  --        Significant Imaging: I have reviewed and interpreted all pertinent imaging results/findings within the past 24 hours.      Assessment/Plan:      * COVID-19 virus  infection  - COVID-19 testing - positive result  - Infection Control notified     - Isolation:   - Airborne, Contact and Droplet Precautions  - Cohort patients into COVID units  - N95 masks must be fit tested, wear eye protection  - 20 second hand hygiene              - Limit visitors per hospital policy              - Consolidating lab draws, nursing care, provider visits, and interventions    - Diagnostics: (leukopenia, hyponatremia, hyperferritinemia, elevated troponin, elevated d-dimer, age, and comorbidities are significant predictors of poor clinical outcome)  CBC, CMP, Ferritin, CRP, LDH, Blood Culture x2 and Portable CXR    - Management:  Supplemental O2 to maintain SpO2 >92%  Telemetry  Continuous/intermittent Pulse Ox  Albuterol treatment PRN  Doxycycline for infiltrate  Vitamin C, multi vitamin  Monitor labs every 48 hours  Advance Care Planning     CXR Shows worsening infiltrates.Will add vancomycin and cefepime     Code Status - FULL CODE according to patient's wife            Acute kidney injury superimposed on CKD   Suspect cardiorenal . Will cont Bumex given worsening renal functions       Sepsis from COVID pneumonia  See 1             Chronic combined systolic and diastolic congestive heart failure  EF 15 % and grade II diastolic dysfunction   Calculated cardiac output 5.2    Unable to check CVP on floor , However neck veins up to 14 cm.   Will cont bumex.. Cont Coreg as per GDMT            Hypertension associated with diabetes  Borderline low blood pressure  Hold antihypertensives for now  Gentle IV hydration - spoke with Cardiology, Dr. Pantoja, who recommended gentle IV hydration        VTE Risk Mitigation (From admission, onward)         Ordered     IP VTE HIGH RISK PATIENT  Once      06/17/20 1147     Place sequential compression device  Until discontinued      06/17/20 1147                      Hernandez Rubio MD  Department of Hospital Medicine   [facility]

## 2020-06-19 NOTE — PROGRESS NOTES
Pt up to bedside to use urinal. Pt noted as being unsteady. bedd alarm on. Urinal in reach at bedside. Water, phone and call light in reach.

## 2020-06-19 NOTE — SUBJECTIVE & OBJECTIVE
Interval History:  Creatinine is 3.4 which is unchanged from yesterday.  Hold IV fluids.  Increase Bumex today    Review of patient's allergies indicates:  No Known Allergies  Current Facility-Administered Medications   Medication Frequency    acetaminophen tablet 650 mg Q4H PRN    albuterol inhaler 2 puff Q8H    aspirin EC tablet 81 mg QAM    bumetanide tablet 1 mg BID    carvediloL tablet 3.125 mg BID WM    cefepime in dextrose 5 % 1 gram/50 mL IVPB 1 g Q24H    dextrose 50% injection 12.5 g PRN    dextrose 50% injection 25 g PRN    dorzolamide-timolol 2-0.5% ophthalmic solution 1 drop BID    doxycycline tablet 100 mg Q12H    glucagon (human recombinant) injection 1 mg PRN    glucose chewable tablet 16 g PRN    glucose chewable tablet 24 g PRN    hydrALAZINE tablet 25 mg Q8H    insulin aspart U-100 pen 1-10 Units QID (AC + HS) PRN    insulin detemir U-100 pen 10 Units QHS    isosorbide dinitrate tablet 10 mg TID    latanoprost 0.005 % ophthalmic solution 1 drop Daily    multivitamin tablet Daily    ondansetron injection 4 mg Q8H PRN    pantoprazole EC tablet 40 mg Daily    sodium chloride 0.9% flush 10 mL PRN    vancomycin - pharmacy to dose pharmacy to manage frequency       Objective:     Vital Signs (Most Recent):  Temp: 97.6 °F (36.4 °C) (06/19/20 1525)  Pulse: 82 (06/19/20 1723)  Resp: 16 (06/19/20 1656)  BP: 99/60 (06/19/20 1525)  SpO2: 97 % (06/19/20 1656)  O2 Device (Oxygen Therapy): room air (06/19/20 1656) Vital Signs (24h Range):  Temp:  [97.6 °F (36.4 °C)-100.9 °F (38.3 °C)] 97.6 °F (36.4 °C)  Pulse:  [60-91] 82  Resp:  [16-22] 16  SpO2:  [95 %-98 %] 97 %  BP: ()/(59-70) 99/60     Weight: 85.1 kg (187 lb 9.8 oz) (06/19/20 0042)  Body mass index is 28.53 kg/m².  Body surface area is 2.02 meters squared.    I/O last 3 completed shifts:  In: 1160 [P.O.:1160]  Out: 312 [Urine:311; Stool:1]    Physical Exam  Vitals signs and nursing note reviewed.   Constitutional:       General:  He is not in acute distress.     Appearance: He is well-developed. He is not ill-appearing.   HENT:      Head: Normocephalic and atraumatic.      Nose: Nose normal.   Eyes:      General: No scleral icterus.     Conjunctiva/sclera: Conjunctivae normal.      Pupils: Pupils are equal, round, and reactive to light.   Neck:      Musculoskeletal: Normal range of motion and neck supple.      Vascular: JVD present.   Cardiovascular:      Rate and Rhythm: Normal rate and regular rhythm.      Heart sounds: Normal heart sounds. No murmur. No friction rub. No gallop.    Pulmonary:      Effort: Tachypnea present.      Breath sounds: Decreased breath sounds present.   Abdominal:      General: Bowel sounds are normal.      Palpations: Abdomen is soft.   Musculoskeletal: Normal range of motion.         General: No tenderness.   Skin:     General: Skin is warm and dry.   Neurological:      Mental Status: He is alert and oriented to person, place, and time.   Psychiatric:         Behavior: Behavior normal.         Significant Labs:  All labs within the past 24 hours have been reviewed.     Significant Imaging:

## 2020-06-19 NOTE — SUBJECTIVE & OBJECTIVE
Interval History: No events overnight. Neck veins not prominent at 45    Review of Systems  Objective:     Vital Signs (Most Recent):  Temp: 97.9 °F (36.6 °C) (06/19/20 1107)  Pulse: 80 (06/19/20 1107)  Resp: 20 (06/19/20 1107)  BP: 106/66 (06/19/20 1107)  SpO2: 98 % (06/19/20 1107) Vital Signs (24h Range):  Temp:  [97.7 °F (36.5 °C)-100.9 °F (38.3 °C)] 97.9 °F (36.6 °C)  Pulse:  [60-88] 80  Resp:  [16-20] 20  SpO2:  [93 %-98 %] 98 %  BP: ()/(59-70) 106/66     Weight: 85.1 kg (187 lb 9.8 oz)  Body mass index is 28.53 kg/m².    Intake/Output Summary (Last 24 hours) at 6/19/2020 1132  Last data filed at 6/19/2020 0600  Gross per 24 hour   Intake 920 ml   Output 312 ml   Net 608 ml      Physical Exam  Constitutional:       Appearance: Normal appearance.   HENT:      Head: Normocephalic and atraumatic.      Nose: Nose normal.   Eyes:      Extraocular Movements: Extraocular movements intact.      Conjunctiva/sclera: Conjunctivae normal.      Pupils: Pupils are equal, round, and reactive to light.   Neck:      Musculoskeletal: Normal range of motion and neck supple.   Cardiovascular:      Rate and Rhythm: Normal rate and regular rhythm.      Pulses: Normal pulses.      Heart sounds: Normal heart sounds.   Pulmonary:      Effort: Pulmonary effort is normal.      Breath sounds: Normal breath sounds.   Abdominal:      General: Abdomen is flat. Bowel sounds are normal.      Palpations: Abdomen is soft.   Musculoskeletal: Normal range of motion.   Skin:     General: Skin is warm and dry.   Neurological:      General: No focal deficit present.      Mental Status: He is alert and oriented to person, place, and time.         Significant Labs:   BMP:   Recent Labs   Lab 06/19/20  0507   GLU 77      K 3.6      CO2 20*   BUN 65*   CREATININE 3.4*   CALCIUM 8.2*   MG 1.5*     CBC:   Recent Labs   Lab 06/18/20  1440 06/19/20  0507 06/19/20  0908   WBC 3.68* 3.00*  --    HGB 12.8* 12.2*  --    HCT 40.6 39.2* 41   PLT  116* 114*  --        Significant Imaging: I have reviewed and interpreted all pertinent imaging results/findings within the past 24 hours.

## 2020-06-20 LAB
ALBUMIN SERPL BCP-MCNC: 2.3 G/DL (ref 3.5–5.2)
ALBUMIN SERPL BCP-MCNC: 2.3 G/DL (ref 3.5–5.2)
ALLENS TEST: ABNORMAL
ALP SERPL-CCNC: 100 U/L (ref 55–135)
ALP SERPL-CCNC: 96 U/L (ref 55–135)
ALT SERPL W/O P-5'-P-CCNC: 113 U/L (ref 10–44)
ALT SERPL W/O P-5'-P-CCNC: 117 U/L (ref 10–44)
ANION GAP SERPL CALC-SCNC: 11 MMOL/L (ref 8–16)
ANION GAP SERPL CALC-SCNC: 13 MMOL/L (ref 8–16)
ANISOCYTOSIS BLD QL SMEAR: SLIGHT
AST SERPL-CCNC: 235 U/L (ref 10–40)
AST SERPL-CCNC: 253 U/L (ref 10–40)
BASOPHILS # BLD AUTO: 0.01 K/UL (ref 0–0.2)
BASOPHILS NFR BLD: 0.2 % (ref 0–1.9)
BILIRUB SERPL-MCNC: 1 MG/DL (ref 0.1–1)
BILIRUB SERPL-MCNC: 1.3 MG/DL (ref 0.1–1)
BUN SERPL-MCNC: 65 MG/DL (ref 8–23)
BUN SERPL-MCNC: 70 MG/DL (ref 8–23)
CALCIUM SERPL-MCNC: 8.3 MG/DL (ref 8.7–10.5)
CALCIUM SERPL-MCNC: 8.4 MG/DL (ref 8.7–10.5)
CHLORIDE SERPL-SCNC: 106 MMOL/L (ref 95–110)
CHLORIDE SERPL-SCNC: 107 MMOL/L (ref 95–110)
CO2 SERPL-SCNC: 19 MMOL/L (ref 23–29)
CO2 SERPL-SCNC: 19 MMOL/L (ref 23–29)
CREAT SERPL-MCNC: 2.9 MG/DL (ref 0.5–1.4)
CREAT SERPL-MCNC: 3 MG/DL (ref 0.5–1.4)
DACRYOCYTES BLD QL SMEAR: ABNORMAL
DELSYS: ABNORMAL
DIFFERENTIAL METHOD: ABNORMAL
EOSINOPHIL # BLD AUTO: 0 K/UL (ref 0–0.5)
EOSINOPHIL NFR BLD: 0.7 % (ref 0–8)
ERYTHROCYTE [DISTWIDTH] IN BLOOD BY AUTOMATED COUNT: 20.2 % (ref 11.5–14.5)
EST. GFR  (AFRICAN AMERICAN): 24 ML/MIN/1.73 M^2
EST. GFR  (AFRICAN AMERICAN): 25 ML/MIN/1.73 M^2
EST. GFR  (NON AFRICAN AMERICAN): 21 ML/MIN/1.73 M^2
EST. GFR  (NON AFRICAN AMERICAN): 21 ML/MIN/1.73 M^2
FLOW: 2
GLUCOSE SERPL-MCNC: 106 MG/DL (ref 70–110)
GLUCOSE SERPL-MCNC: 99 MG/DL (ref 70–110)
HCO3 UR-SCNC: 19.1 MMOL/L (ref 24–28)
HCT VFR BLD AUTO: 40.3 % (ref 40–54)
HGB BLD-MCNC: 12.5 G/DL (ref 14–18)
HYPOCHROMIA BLD QL SMEAR: ABNORMAL
IMM GRANULOCYTES # BLD AUTO: 0.01 K/UL (ref 0–0.04)
IMM GRANULOCYTES NFR BLD AUTO: 0.2 % (ref 0–0.5)
LYMPHOCYTES # BLD AUTO: 1.1 K/UL (ref 1–4.8)
LYMPHOCYTES NFR BLD: 24.9 % (ref 18–48)
MCH RBC QN AUTO: 24 PG (ref 27–31)
MCHC RBC AUTO-ENTMCNC: 31 G/DL (ref 32–36)
MCV RBC AUTO: 78 FL (ref 82–98)
MODE: ABNORMAL
MONOCYTES # BLD AUTO: 0.3 K/UL (ref 0.3–1)
MONOCYTES NFR BLD: 7.7 % (ref 4–15)
NEUTROPHILS # BLD AUTO: 2.8 K/UL (ref 1.8–7.7)
NEUTROPHILS NFR BLD: 66.3 % (ref 38–73)
NRBC BLD-RTO: 0 /100 WBC
OVALOCYTES BLD QL SMEAR: ABNORMAL
PCO2 BLDA: 35.8 MMHG (ref 35–45)
PH SMN: 7.33 [PH] (ref 7.35–7.45)
PLATELET # BLD AUTO: 121 K/UL (ref 150–350)
PLATELET BLD QL SMEAR: ABNORMAL
PMV BLD AUTO: ABNORMAL FL (ref 9.2–12.9)
PO2 BLDA: 38 MMHG (ref 40–60)
POC BE: -7 MMOL/L
POC SATURATED O2: 69 % (ref 95–100)
POCT GLUCOSE: 102 MG/DL (ref 70–110)
POCT GLUCOSE: 106 MG/DL (ref 70–110)
POCT GLUCOSE: 106 MG/DL (ref 70–110)
POCT GLUCOSE: 76 MG/DL (ref 70–110)
POIKILOCYTOSIS BLD QL SMEAR: SLIGHT
POLYCHROMASIA BLD QL SMEAR: ABNORMAL
POTASSIUM SERPL-SCNC: 3.8 MMOL/L (ref 3.5–5.1)
POTASSIUM SERPL-SCNC: 3.9 MMOL/L (ref 3.5–5.1)
PROT SERPL-MCNC: 6.5 G/DL (ref 6–8.4)
PROT SERPL-MCNC: 6.5 G/DL (ref 6–8.4)
RBC # BLD AUTO: 5.2 M/UL (ref 4.6–6.2)
SAMPLE: ABNORMAL
SITE: ABNORMAL
SODIUM SERPL-SCNC: 137 MMOL/L (ref 136–145)
SODIUM SERPL-SCNC: 138 MMOL/L (ref 136–145)
TARGETS BLD QL SMEAR: ABNORMAL
VANCOMYCIN SERPL-MCNC: 14.3 UG/ML
WBC # BLD AUTO: 4.29 K/UL (ref 3.9–12.7)

## 2020-06-20 PROCEDURE — 21400001 HC TELEMETRY ROOM

## 2020-06-20 PROCEDURE — 25000003 PHARM REV CODE 250: Performed by: NURSE PRACTITIONER

## 2020-06-20 PROCEDURE — 94761 N-INVAS EAR/PLS OXIMETRY MLT: CPT

## 2020-06-20 PROCEDURE — 99232 SBSQ HOSP IP/OBS MODERATE 35: CPT | Mod: CR,,, | Performed by: INTERNAL MEDICINE

## 2020-06-20 PROCEDURE — 25000003 PHARM REV CODE 250: Performed by: HOSPITALIST

## 2020-06-20 PROCEDURE — 27000221 HC OXYGEN, UP TO 24 HOURS

## 2020-06-20 PROCEDURE — 94640 AIRWAY INHALATION TREATMENT: CPT

## 2020-06-20 PROCEDURE — 99232 PR SUBSEQUENT HOSPITAL CARE,LEVL II: ICD-10-PCS | Mod: ,,, | Performed by: INTERNAL MEDICINE

## 2020-06-20 PROCEDURE — 99232 PR SUBSEQUENT HOSPITAL CARE,LEVL II: ICD-10-PCS | Mod: CR,,, | Performed by: INTERNAL MEDICINE

## 2020-06-20 PROCEDURE — 80053 COMPREHEN METABOLIC PANEL: CPT | Mod: 91

## 2020-06-20 PROCEDURE — 36415 COLL VENOUS BLD VENIPUNCTURE: CPT

## 2020-06-20 PROCEDURE — 99232 SBSQ HOSP IP/OBS MODERATE 35: CPT | Mod: ,,, | Performed by: INTERNAL MEDICINE

## 2020-06-20 PROCEDURE — 99900035 HC TECH TIME PER 15 MIN (STAT)

## 2020-06-20 PROCEDURE — 80202 ASSAY OF VANCOMYCIN: CPT

## 2020-06-20 PROCEDURE — 63600175 PHARM REV CODE 636 W HCPCS: Performed by: HOSPITALIST

## 2020-06-20 PROCEDURE — 85025 COMPLETE CBC W/AUTO DIFF WBC: CPT

## 2020-06-20 RX ORDER — FUROSEMIDE 10 MG/ML
80 INJECTION INTRAMUSCULAR; INTRAVENOUS
Status: DISCONTINUED | OUTPATIENT
Start: 2020-06-20 | End: 2020-06-20

## 2020-06-20 RX ADMIN — ASPIRIN 81 MG: 81 TABLET, COATED ORAL at 08:06

## 2020-06-20 RX ADMIN — ALBUTEROL SULFATE 2 PUFF: 90 AEROSOL, METERED RESPIRATORY (INHALATION) at 02:06

## 2020-06-20 RX ADMIN — ISOSORBIDE DINITRATE 10 MG: 10 TABLET ORAL at 08:06

## 2020-06-20 RX ADMIN — INSULIN DETEMIR 10 UNITS: 100 INJECTION, SOLUTION SUBCUTANEOUS at 09:06

## 2020-06-20 RX ADMIN — DOXYCYCLINE HYCLATE 100 MG: 100 TABLET, COATED ORAL at 08:06

## 2020-06-20 RX ADMIN — CEFTRIAXONE 1 G: 1 INJECTION, SOLUTION INTRAVENOUS at 01:06

## 2020-06-20 RX ADMIN — PANTOPRAZOLE SODIUM 40 MG: 40 TABLET, DELAYED RELEASE ORAL at 08:06

## 2020-06-20 RX ADMIN — DOXYCYCLINE HYCLATE 100 MG: 100 TABLET, COATED ORAL at 09:06

## 2020-06-20 RX ADMIN — THERA TABS 1 TABLET: TAB at 08:06

## 2020-06-20 RX ADMIN — HYDRALAZINE HYDROCHLORIDE 25 MG: 25 TABLET, FILM COATED ORAL at 05:06

## 2020-06-20 RX ADMIN — LATANOPROST 1 DROP: 50 SOLUTION OPHTHALMIC at 08:06

## 2020-06-20 RX ADMIN — DORZOLAMIDE HYDROCHLORIDE AND TIMOLOL MALEATE 1 DROP: 20; 5 SOLUTION/ DROPS OPHTHALMIC at 08:06

## 2020-06-20 RX ADMIN — ALBUTEROL SULFATE 2 PUFF: 90 AEROSOL, METERED RESPIRATORY (INHALATION) at 08:06

## 2020-06-20 RX ADMIN — DORZOLAMIDE HYDROCHLORIDE AND TIMOLOL MALEATE 1 DROP: 20; 5 SOLUTION/ DROPS OPHTHALMIC at 09:06

## 2020-06-20 RX ADMIN — BUMETANIDE 1 MG: 1 TABLET ORAL at 08:06

## 2020-06-20 RX ADMIN — CARVEDILOL 3.12 MG: 3.12 TABLET, FILM COATED ORAL at 08:06

## 2020-06-20 RX ADMIN — CEFEPIME HYDROCHLORIDE 1 G: 1 INJECTION, SOLUTION INTRAVENOUS at 08:06

## 2020-06-20 RX ADMIN — ALBUTEROL SULFATE 2 PUFF: 90 AEROSOL, METERED RESPIRATORY (INHALATION) at 11:06

## 2020-06-20 NOTE — SUBJECTIVE & OBJECTIVE
Interval History: no acute issues noted per chart review. Renal function improved slightly after resumption of diuretic dose. Will be available PRN, call with changes.     Review of Systems   Constitution: Positive for malaise/fatigue.   Cardiovascular: Positive for dyspnea on exertion, leg swelling and orthopnea.   Respiratory: Positive for cough and shortness of breath.    Psychiatric/Behavioral: Positive for altered mental status.     Objective:     Vital Signs (Most Recent):  Temp: 98 °F (36.7 °C) (06/20/20 0416)  Pulse: 91 (06/20/20 0804)  Resp: 20 (06/20/20 0804)  BP: 111/73 (06/20/20 0416)  SpO2: 96 % (06/20/20 0804) Vital Signs (24h Range):  Temp:  [97.6 °F (36.4 °C)-99.1 °F (37.3 °C)] 98 °F (36.7 °C)  Pulse:  [71-98] 91  Resp:  [16-22] 20  SpO2:  [95 %-99 %] 96 %  BP: ()/(60-78) 111/73     Weight: 83.3 kg (183 lb 10.3 oz)  Body mass index is 27.92 kg/m².     SpO2: 96 %  O2 Device (Oxygen Therapy): nasal cannula      Intake/Output Summary (Last 24 hours) at 6/20/2020 0813  Last data filed at 6/20/2020 0600  Gross per 24 hour   Intake 1350 ml   Output --   Net 1350 ml       Lines/Drains/Airways     Peripherally Inserted Central Catheter Line            PICC Double Lumen 06/18/20 2000 right basilic 1 day                Physical Exam    Significant Labs:   Blood Culture: No results for input(s): LABBLOO in the last 48 hours., BMP:   Recent Labs   Lab 06/18/20  1440 06/19/20  0507 06/20/20  0503   * 77 106    138 137   K 3.6 3.6 3.8    108 107   CO2 16* 20* 19*   BUN 59* 65* 70*   CREATININE 3.2* 3.4* 3.0*   CALCIUM 8.1* 8.2* 8.4*   MG  --  1.5*  --    , CBC   Recent Labs   Lab 06/18/20  1440 06/19/20  0507 06/19/20  0908   WBC 3.68* 3.00*  --    HGB 12.8* 12.2*  --    HCT 40.6 39.2* 41   * 114*  --     and All pertinent lab results from the last 24 hours have been reviewed.    Significant Imaging: Echocardiogram:   Transthoracic echo (TTE) complete (Cupid Only):   Results for  orders placed or performed during the hospital encounter of 05/28/20   Echo Color Flow Doppler? Yes   Result Value Ref Range    Ascending aorta 3.50 cm    STJ 3.56 cm    AV mean gradient 2 mmHg    Ao peak luis f 0.88 m/s    Ao VTI 16.61 cm    IVRT 78.43 msec    IVS 1.11 (A) 0.6 - 1.1 cm    LA size 4.16 cm    Left Atrium Major Axis 5.28 cm    Left Atrium Minor Axis 5.18 cm    LVIDD 5.55 3.5 - 6.0 cm    LVIDS 5.30 (A) 2.1 - 4.0 cm    LVOT diameter 2.01 cm    LVOT peak VTI 16.95 cm    PW 1.02 0.6 - 1.1 cm    MV Peak A Luis F 0.30 m/s    E wave decelartion time 141.72 msec    MV Peak E Luis F 0.79 m/s    RA Major Axis 4.80 cm    RVDD 3.62 cm    Sinus 3.66 cm    TAPSE 1.21 cm    TR Max Luis F 3.46 m/s    Ao root annulus 3.63 cm    LV Diastolic Volume 150.25 mL    LV Systolic Volume 135.34 mL    LVOT peak luis f 0.69 m/s    LA WIDTH 3.58 cm    RA Width 4.13 cm    FS 5 %    LA volume 66.20 cm3    LV mass 235.25 g    Left Ventricle Relative Wall Thickness 0.37 cm    AV valve area 3.24 cm2    AV Velocity Ratio 0.78     AV index (prosthetic) 1.02     E/A ratio 2.63     LVOT area 3.2 cm2    LVOT stroke volume 53.76 cm3    AV peak gradient 3 mmHg    LV Systolic Volume Index 62.2 mL/m2    LV Diastolic Volume Index 69.02 mL/m2    LA Volume Index 30.4 mL/m2    LV Mass Index 108 g/m2    Triscuspid Valve Regurgitation Peak Gradient 48 mmHg    BSA 2.25 m2    Right Atrial Pressure (from IVC) 15 mmHg    TV rest pulmonary artery pressure 63 mmHg    Narrative    · Mild left ventricular enlargement.  · Left ventricular systolic function. The estimated ejection fraction is   15%.  · Grade III (severe) left ventricular diastolic dysfunction consistent   with restrictive physiology.  · Severe global hypokinetic wall motion.  · Moderate right ventricular enlargement.  · Moderately reduced right ventricular systolic function.  · Moderate to severe tricuspid regurgitation.  · Mild to moderate pulmonic regurgitation.  · Mild aortic regurgitation.  · Elevated  central venous pressure (15 mmHg).  · The estimated PA systolic pressure is 63 mmHg.  · Pulmonary hypertension present.

## 2020-06-20 NOTE — PROGRESS NOTES
Therapy with vancomycin complete and/or consult discontinued by provider.  Pharmacy will sign off, please re-consult as needed.    Thank you for allowing us to participate in this patient's care.    Rosie Hart, PharmD 6/20/2020 11:14 AM

## 2020-06-20 NOTE — HOSPITAL COURSE
6/20/20-Patient not seen and examined given +COVID status. Labs extensively reviewed, K+3.8, Cr 3.0, BUN 70, Na 137, BC NGTD. No documentation of output in past 24 hours. Resumed diuretics with improvement in Cr today. Will be available PRN.

## 2020-06-20 NOTE — SUBJECTIVE & OBJECTIVE
Interval History: No events overnight     Review of Systems  Objective:     Vital Signs (Most Recent):  Temp: 99.2 °F (37.3 °C) (06/20/20 0848)  Pulse: 81 (06/20/20 0945)  Resp: 20 (06/20/20 0848)  BP: 110/71 (06/20/20 0848)  SpO2: 97 % (06/20/20 0848) Vital Signs (24h Range):  Temp:  [97.6 °F (36.4 °C)-99.2 °F (37.3 °C)] 99.2 °F (37.3 °C)  Pulse:  [72-98] 81  Resp:  [16-22] 20  SpO2:  [96 %-99 %] 97 %  BP: ()/(60-78) 110/71     Weight: 83.3 kg (183 lb 10.3 oz)  Body mass index is 27.92 kg/m².    Intake/Output Summary (Last 24 hours) at 6/20/2020 1112  Last data filed at 6/20/2020 0600  Gross per 24 hour   Intake 1350 ml   Output --   Net 1350 ml      Physical Exam  Constitutional:       Appearance: Normal appearance.   HENT:      Head: Normocephalic and atraumatic.      Nose: Nose normal.   Eyes:      Extraocular Movements: Extraocular movements intact.      Conjunctiva/sclera: Conjunctivae normal.      Pupils: Pupils are equal, round, and reactive to light.   Neck:      Musculoskeletal: Normal range of motion and neck supple.   Cardiovascular:      Rate and Rhythm: Normal rate and regular rhythm.      Pulses: Normal pulses.      Heart sounds: Normal heart sounds.   Pulmonary:      Effort: Pulmonary effort is normal.      Breath sounds: Normal breath sounds.   Abdominal:      General: Abdomen is flat. Bowel sounds are normal.      Palpations: Abdomen is soft.   Musculoskeletal: Normal range of motion.   Skin:     General: Skin is warm and dry.   Neurological:      General: No focal deficit present.      Mental Status: He is alert and oriented to person, place, and time.   Significant Labs: All pertinent labs within the past 24 hours have been reviewed.    Significant Imaging: I have reviewed and interpreted all pertinent imaging results/findings within the past 24 hours.

## 2020-06-20 NOTE — ASSESSMENT & PLAN NOTE
-BNP 1874  -Recommend IV Bumex 2 mg BID  -Low threshold for Dobutamine infusion failure to respond in 24 hours  -Recommend nephrology consult  -Continue home meds as tolerated  -Daily weights with standing scale  -Strict I/Os  -Assess response    6/20  -Continue Bumex, Coreg, Hydralazine, Isordil  -NO ACEi/ARB given renal function  -Will be available PRN, call with changes.

## 2020-06-20 NOTE — PLAN OF CARE
No falls today. Safety maintained. Plan of care reviewed with patient, but patient's poor recall will require plan of care reinforcement.

## 2020-06-20 NOTE — PROGRESS NOTES
[facility] -   Nephrology  Progress Note    Patient Name: Yan Choudhury  MRN: [id number]  Admission Date: 6/17/2020  Hospital Length of Stay: 3 days  Attending Provider: Hernandez Rubio MD   Primary Care Physician: Sandra Estevez MD  Principal Problem:COVID-19 virus infection    Subjective:     HPI: Pt was seen and examined. H/o and chart reviewed. Pt is a 66 y/o male with a h/o of CKD stage 3, severe combined diastolic and systolic CHF (EF 15%), DM, HTN, and obesity who presented with weakness and SOB. Pt was admitted for COVID-19 infection. Renal was consulted because s Cr has worsened from baseline of about 2.5 to 4.1, repeat today 3.2. Labs, meds, mgmt (both inpt and outpt) reviewed. Pt was on bumex 2 mg po tid and metolazone 5 mg po qd. No GI losses reported, denies NSAIds, no abx,, no recent infections. No other pertinent issues. Currently, he denies SOB, has no leg swelling. All the diuretics are on hold.    Interval History: kidney function stable     Review of patient's allergies indicates:  No Known Allergies  Current Facility-Administered Medications   Medication Frequency    acetaminophen tablet 650 mg Q4H PRN    albuterol inhaler 2 puff Q8H    aspirin EC tablet 81 mg QAM    carvediloL tablet 3.125 mg BID WM    cefTRIAXone (ROCEPHIN) 1 g/50 mL D5W IVPB Q24H    dextrose 50% injection 12.5 g PRN    dextrose 50% injection 25 g PRN    dorzolamide-timolol 2-0.5% ophthalmic solution 1 drop BID    doxycycline tablet 100 mg Q12H    glucagon (human recombinant) injection 1 mg PRN    glucose chewable tablet 16 g PRN    glucose chewable tablet 24 g PRN    hydrALAZINE tablet 25 mg Q8H    insulin aspart U-100 pen 1-10 Units QID (AC + HS) PRN    insulin detemir U-100 pen 10 Units QHS    isosorbide dinitrate tablet 10 mg TID    latanoprost 0.005 % ophthalmic solution 1 drop Daily    multivitamin tablet Daily    ondansetron injection 4 mg Q8H PRN    pantoprazole EC tablet 40 mg Daily     sodium chloride 0.9% flush 10 mL PRN       Objective:     Vital Signs (Most Recent):  Temp: 98.7 °F (37.1 °C) (06/20/20 1137)  Pulse: 89 (06/20/20 1447)  Resp: 18 (06/20/20 1447)  BP: 98/65 (06/20/20 1137)  SpO2: 95 % (06/20/20 1447)  O2 Device (Oxygen Therapy): nasal cannula (06/20/20 1447) Vital Signs (24h Range):  Temp:  [98 °F (36.7 °C)-99.2 °F (37.3 °C)] 98.7 °F (37.1 °C)  Pulse:  [77-98] 89  Resp:  [16-20] 18  SpO2:  [95 %-99 %] 95 %  BP: ()/(61-78) 98/65     Weight: 83.3 kg (183 lb 10.3 oz) (06/20/20 0416)  Body mass index is 27.92 kg/m².  Body surface area is 2 meters squared.    I/O last 3 completed shifts:  In: 2030 [P.O.:1980; IV Piggyback:50]  Out: 312 [Urine:311; Stool:1]    Physical Exam   Please see  details of the examination by Hospital Medicine.  Due to COVID-19 we as consultants are relying mostly on Hospital Medicine physical examination in order to conserve the PPE and exposure to COVID-19 virus      Significant Labs:  All labs within the past 24 hours have been reviewed.     Significant Imaging:      Assessment/Plan:     Acute kidney injury superimposed on CKD  BROOKE on CKD stage 4.  No indications for acute or chronic HD at present.      6/19/20: Creatinine is 3.4 which is unchanged from yesterday.  Hold IV fluids.  Increase Bumex today    6/20/20:  Patient is euvolemic today.  Creatinine is stable.  Adequate urine output.  Increased LFTs discussed with Hospital Medicine            Thank you for your consult.     Sandoval Damico MD  Nephrology  [facility] -

## 2020-06-20 NOTE — SUBJECTIVE & OBJECTIVE
Interval History: kidney function stable     Review of patient's allergies indicates:  No Known Allergies  Current Facility-Administered Medications   Medication Frequency    acetaminophen tablet 650 mg Q4H PRN    albuterol inhaler 2 puff Q8H    aspirin EC tablet 81 mg QAM    carvediloL tablet 3.125 mg BID WM    cefTRIAXone (ROCEPHIN) 1 g/50 mL D5W IVPB Q24H    dextrose 50% injection 12.5 g PRN    dextrose 50% injection 25 g PRN    dorzolamide-timolol 2-0.5% ophthalmic solution 1 drop BID    doxycycline tablet 100 mg Q12H    glucagon (human recombinant) injection 1 mg PRN    glucose chewable tablet 16 g PRN    glucose chewable tablet 24 g PRN    hydrALAZINE tablet 25 mg Q8H    insulin aspart U-100 pen 1-10 Units QID (AC + HS) PRN    insulin detemir U-100 pen 10 Units QHS    isosorbide dinitrate tablet 10 mg TID    latanoprost 0.005 % ophthalmic solution 1 drop Daily    multivitamin tablet Daily    ondansetron injection 4 mg Q8H PRN    pantoprazole EC tablet 40 mg Daily    sodium chloride 0.9% flush 10 mL PRN       Objective:     Vital Signs (Most Recent):  Temp: 98.7 °F (37.1 °C) (06/20/20 1137)  Pulse: 89 (06/20/20 1447)  Resp: 18 (06/20/20 1447)  BP: 98/65 (06/20/20 1137)  SpO2: 95 % (06/20/20 1447)  O2 Device (Oxygen Therapy): nasal cannula (06/20/20 1447) Vital Signs (24h Range):  Temp:  [98 °F (36.7 °C)-99.2 °F (37.3 °C)] 98.7 °F (37.1 °C)  Pulse:  [77-98] 89  Resp:  [16-20] 18  SpO2:  [95 %-99 %] 95 %  BP: ()/(61-78) 98/65     Weight: 83.3 kg (183 lb 10.3 oz) (06/20/20 0416)  Body mass index is 27.92 kg/m².  Body surface area is 2 meters squared.    I/O last 3 completed shifts:  In: 2030 [P.O.:1980; IV Piggyback:50]  Out: 312 [Urine:311; Stool:1]    Physical Exam   Please see  details of the examination by Hospital Medicine.  Due to COVID-19 we as consultants are relying mostly on Hospital Medicine physical examination in order to conserve the PPE and exposure to COVID-19  virus      Significant Labs:  All labs within the past 24 hours have been reviewed.     Significant Imaging:

## 2020-06-20 NOTE — PROGRESS NOTES
[facility] Hospital Medicine  Progress Note    Patient Name: Yan Choudhury  MRN: [id number]  Patient Class: IP- Inpatient   Admission Date: 6/17/2020  Length of Stay: 3 days  Attending Physician: Hernandez Rubio MD  Primary Care Provider: Sandra Estevez MD        Subjective:     Principal Problem:COVID-19 virus infection        HPI:  Pt is a 64 yo male with PMHx of combined CHF with EF 15 %, CKD III, DM II and ischemic cardiomyopathy who presents to the ED due to weakness. Wife provides history as patient is a difficult historian. Wife explains pt has had a decreased appetite with increasing weakness onset for approx 6 days. Wife says that Cardiology mentioned possible dehydration. Wife says that pt has not been eating or drinking regularly with increased weakness, walking slow and increased fatigue. While in the ED, nurse states that pt was tachypnic with respiratory rate 26.   On arrival: Temp 97, pulse 74, resp 22, B/P 99/56 and pulse ox 95 on RA.   CXR - Slight increase in the the vascular markings suggested, a little greater at the right base.  Mild congestion?  No overt failure.    BNP = 1874 (last admission 2300)  Labs find WBC 3.13, Na 131, serum creatinine 4.1 (baseline 2.7), glucose 119  PT IS COVID +  Pt has received a saline bolus 250 cc in the ED. Afterwards, blood pressure noted 82/62 and a second saline bolus of 250 cc given.       Overview/Hospital Course:  6/18  Patient states his breathing has improved. Renal function worsened from prior study - Nephrology has been consulted. Discussed with Cardiology. Patient will have PICC placed to measure Cardiac Output. Patient a candidate for Remdesivir pending ID review and recommendation. Patient reports SOB + Cough.     Interval History: No events overnight     Review of Systems  Objective:     Vital Signs (Most Recent):  Temp: 99.2 °F (37.3 °C) (06/20/20 0848)  Pulse: 81 (06/20/20 0945)  Resp: 20 (06/20/20 0848)  BP: 110/71 (06/20/20  0848)  SpO2: 97 % (06/20/20 0848) Vital Signs (24h Range):  Temp:  [97.6 °F (36.4 °C)-99.2 °F (37.3 °C)] 99.2 °F (37.3 °C)  Pulse:  [72-98] 81  Resp:  [16-22] 20  SpO2:  [96 %-99 %] 97 %  BP: ()/(60-78) 110/71     Weight: 83.3 kg (183 lb 10.3 oz)  Body mass index is 27.92 kg/m².    Intake/Output Summary (Last 24 hours) at 6/20/2020 1112  Last data filed at 6/20/2020 0600  Gross per 24 hour   Intake 1350 ml   Output --   Net 1350 ml      Physical Exam  Constitutional:       Appearance: Normal appearance.   HENT:      Head: Normocephalic and atraumatic.      Nose: Nose normal.   Eyes:      Extraocular Movements: Extraocular movements intact.      Conjunctiva/sclera: Conjunctivae normal.      Pupils: Pupils are equal, round, and reactive to light.   Neck:      Musculoskeletal: Normal range of motion and neck supple.   Cardiovascular:      Rate and Rhythm: Normal rate and regular rhythm.      Pulses: Normal pulses.      Heart sounds: Normal heart sounds.   Pulmonary:      Effort: Pulmonary effort is normal.      Breath sounds: Normal breath sounds.   Abdominal:      General: Abdomen is flat. Bowel sounds are normal.      Palpations: Abdomen is soft.   Musculoskeletal: Normal range of motion.   Skin:     General: Skin is warm and dry.   Neurological:      General: No focal deficit present.      Mental Status: He is alert and oriented to person, place, and time.   Significant Labs: All pertinent labs within the past 24 hours have been reviewed.    Significant Imaging: I have reviewed and interpreted all pertinent imaging results/findings within the past 24 hours.      Assessment/Plan:      * COVID-19 virus infection  - COVID-19 testing - positive result  - Infection Control notified     - Isolation:   - Airborne, Contact and Droplet Precautions  - Cohort patients into COVID units  - N95 masks must be fit tested, wear eye protection  - 20 second hand hygiene              - Limit visitors per hospital policy               - Consolidating lab draws, nursing care, provider visits, and interventions    - Diagnostics: (leukopenia, hyponatremia, hyperferritinemia, elevated troponin, elevated d-dimer, age, and comorbidities are significant predictors of poor clinical outcome)  CBC, CMP, Ferritin, CRP, LDH, Blood Culture x2 and Portable CXR    - Management:  Supplemental O2 to maintain SpO2 >92%  Telemetry  Continuous/intermittent Pulse Ox  Albuterol treatment PRN  Doxycycline for infiltrate  Vitamin C, multi vitamin  Monitor labs every 48 hours  Advance Care Planning     DC vanc and cefepime. Cont ceftriaxone and doxycycline     Code Status - FULL CODE according to patient's wife            Acute kidney injury superimposed on CKD stage 3    Pt has good cardiac output. No JVP seen to think this is cardio-renal.. Hold bumex and cont to monitor       Sepsis   See 1       Chronic combined systolic and diastolic congestive heart failure  LFT trending up , Will cont diuresis and recheck his LFTS          Hypertension associated with diabetes   Cont hydralazine and Isordil         VTE Risk Mitigation (From admission, onward)         Ordered     IP VTE HIGH RISK PATIENT  Once      06/17/20 1147     Place sequential compression device  Until discontinued      06/17/20 1147                      Hernandez Rubio MD  Department of Hospital Medicine   [facility]

## 2020-06-20 NOTE — ASSESSMENT & PLAN NOTE
BROOKE on CKD stage 4.  No indications for acute or chronic HD at present.      6/19/20: Creatinine is 3.4 which is unchanged from yesterday.  Hold IV fluids.  Increase Bumex today    6/20/20:  Patient is euvolemic today.  Creatinine is stable.  Adequate urine output.  Increased LFTs discussed with Hospital Medicine

## 2020-06-20 NOTE — PROGRESS NOTES
[facility]  Cardiology  Progress Note    Patient Name: Yan Choudhury  MRN: [id number]  Admission Date: 6/17/2020  Hospital Length of Stay: 3 days  Code Status: Full Code   Attending Physician: Hernandez Rubio MD   Primary Care Physician: Sandra Estevez MD  Expected Discharge Date: 6/22/2020  Principal Problem:COVID-19 virus infection    Subjective:   HPI    PATIENT NOT SEEN AND EXAMINED DUE TO +COVID STATUS, INFORMATION OBTAINED FROM CHART REVIEW    Yan Choudhury is a 65 year old male who presented to [facility] due to altered mental status, shortness of breath and leg swelling. His current medical conditions include Chronic combined CHF, HFrEF, s/p CRT-ICD, DCM, LBBB, HTN, CKD, DM type II, PHTN, HLP, Obesity, CAD. ED workup revealed COVID 19+, BNP 1874, Troponin 0.139, H/H 12/40, K+ 3.7, Cr 4.1, na 131. He was placed in observation under the care of hospital medicine. Cardiology consulted to assist with medical management. Chart extensively reviewed. Previous ECHO report revealed EF 15%, Grade III DD. Would recommend IV diuresis and have low threshold for addition of IV dobutamine infusion if does not respond in 24 hours with IV diuresis and Metolazone.       Hospital Course:   6/20/20-Patient not seen and examined given +COVID status. Labs extensively reviewed, K+3.8, Cr 3.0, BUN 70, Na 137, BC NGTD. No documentation of output in past 24 hours. Resumed diuretics with improvement in Cr today. Will be available PRN.     Interval History: no acute issues noted per chart review. Renal function improved slightly after resumption of diuretic dose. Will be available PRN, call with changes.     Review of Systems   Constitution: Positive for malaise/fatigue.   Cardiovascular: Positive for dyspnea on exertion, leg swelling and orthopnea.   Respiratory: Positive for cough and shortness of breath.    Psychiatric/Behavioral: Positive for altered mental status.     Objective:     Vital Signs (Most Recent):  Temp: 98 °F  (36.7 °C) (06/20/20 0416)  Pulse: 91 (06/20/20 0804)  Resp: 20 (06/20/20 0804)  BP: 111/73 (06/20/20 0416)  SpO2: 96 % (06/20/20 0804) Vital Signs (24h Range):  Temp:  [97.6 °F (36.4 °C)-99.1 °F (37.3 °C)] 98 °F (36.7 °C)  Pulse:  [71-98] 91  Resp:  [16-22] 20  SpO2:  [95 %-99 %] 96 %  BP: ()/(60-78) 111/73     Weight: 83.3 kg (183 lb 10.3 oz)  Body mass index is 27.92 kg/m².     SpO2: 96 %  O2 Device (Oxygen Therapy): nasal cannula      Intake/Output Summary (Last 24 hours) at 6/20/2020 0813  Last data filed at 6/20/2020 0600  Gross per 24 hour   Intake 1350 ml   Output --   Net 1350 ml       Lines/Drains/Airways     Peripherally Inserted Central Catheter Line            PICC Double Lumen 06/18/20 2000 right basilic 1 day                Physical Exam    Significant Labs:   Blood Culture: No results for input(s): LABBLOO in the last 48 hours., BMP:   Recent Labs   Lab 06/18/20  1440 06/19/20  0507 06/20/20  0503   * 77 106    138 137   K 3.6 3.6 3.8    108 107   CO2 16* 20* 19*   BUN 59* 65* 70*   CREATININE 3.2* 3.4* 3.0*   CALCIUM 8.1* 8.2* 8.4*   MG  --  1.5*  --    , CBC   Recent Labs   Lab 06/18/20  1440 06/19/20  0507 06/19/20  0908   WBC 3.68* 3.00*  --    HGB 12.8* 12.2*  --    HCT 40.6 39.2* 41   * 114*  --     and All pertinent lab results from the last 24 hours have been reviewed.    Significant Imaging: Echocardiogram:   Transthoracic echo (TTE) complete (Cupid Only):   Results for orders placed or performed during the hospital encounter of 05/28/20   Echo Color Flow Doppler? Yes   Result Value Ref Range    Ascending aorta 3.50 cm    STJ 3.56 cm    AV mean gradient 2 mmHg    Ao peak levon 0.88 m/s    Ao VTI 16.61 cm    IVRT 78.43 msec    IVS 1.11 (A) 0.6 - 1.1 cm    LA size 4.16 cm    Left Atrium Major Axis 5.28 cm    Left Atrium Minor Axis 5.18 cm    LVIDD 5.55 3.5 - 6.0 cm    LVIDS 5.30 (A) 2.1 - 4.0 cm    LVOT diameter 2.01 cm    LVOT peak VTI 16.95 cm    PW 1.02 0.6 -  1.1 cm    MV Peak A Luis F 0.30 m/s    E wave decelartion time 141.72 msec    MV Peak E Luis F 0.79 m/s    RA Major Axis 4.80 cm    RVDD 3.62 cm    Sinus 3.66 cm    TAPSE 1.21 cm    TR Max Luis F 3.46 m/s    Ao root annulus 3.63 cm    LV Diastolic Volume 150.25 mL    LV Systolic Volume 135.34 mL    LVOT peak luis f 0.69 m/s    LA WIDTH 3.58 cm    RA Width 4.13 cm    FS 5 %    LA volume 66.20 cm3    LV mass 235.25 g    Left Ventricle Relative Wall Thickness 0.37 cm    AV valve area 3.24 cm2    AV Velocity Ratio 0.78     AV index (prosthetic) 1.02     E/A ratio 2.63     LVOT area 3.2 cm2    LVOT stroke volume 53.76 cm3    AV peak gradient 3 mmHg    LV Systolic Volume Index 62.2 mL/m2    LV Diastolic Volume Index 69.02 mL/m2    LA Volume Index 30.4 mL/m2    LV Mass Index 108 g/m2    Triscuspid Valve Regurgitation Peak Gradient 48 mmHg    BSA 2.25 m2    Right Atrial Pressure (from IVC) 15 mmHg    TV rest pulmonary artery pressure 63 mmHg    Narrative    · Mild left ventricular enlargement.  · Left ventricular systolic function. The estimated ejection fraction is   15%.  · Grade III (severe) left ventricular diastolic dysfunction consistent   with restrictive physiology.  · Severe global hypokinetic wall motion.  · Moderate right ventricular enlargement.  · Moderately reduced right ventricular systolic function.  · Moderate to severe tricuspid regurgitation.  · Mild to moderate pulmonic regurgitation.  · Mild aortic regurgitation.  · Elevated central venous pressure (15 mmHg).  · The estimated PA systolic pressure is 63 mmHg.  · Pulmonary hypertension present.        Assessment and Plan:       * COVID-19 virus infection  Mgmt per primary team  On IV ABX    Stage 4 chronic kidney disease  Nephrology on board      Chronic combined systolic and diastolic congestive heart failure  -BNP 1874  -Recommend IV Bumex 2 mg BID  -Low threshold for Dobutamine infusion failure to respond in 24 hours  -Recommend nephrology consult  -Continue home  meds as tolerated  -Daily weights with standing scale  -Strict I/Os  -Assess response    6/20  -Continue Bumex, Coreg, Hydralazine, Isordil  -NO ACEi/ARB given renal function  -Will be available PRN, call with changes.     Pulmonary HTN  Continue IV diuresis    Hypertension associated with diabetes  Continue home meds        VTE Risk Mitigation (From admission, onward)         Ordered     IP VTE HIGH RISK PATIENT  Once      06/17/20 1147     Place sequential compression device  Until discontinued      06/17/20 1147                [address]  Cardiology  [facility]

## 2020-06-21 LAB
ALBUMIN SERPL BCP-MCNC: 2.3 G/DL (ref 3.5–5.2)
ALP SERPL-CCNC: 102 U/L (ref 55–135)
ALT SERPL W/O P-5'-P-CCNC: 123 U/L (ref 10–44)
ANION GAP SERPL CALC-SCNC: 12 MMOL/L (ref 8–16)
AST SERPL-CCNC: 257 U/L (ref 10–40)
BASOPHILS # BLD AUTO: 0.01 K/UL (ref 0–0.2)
BASOPHILS NFR BLD: 0.2 % (ref 0–1.9)
BILIRUB SERPL-MCNC: 1.3 MG/DL (ref 0.1–1)
BUN SERPL-MCNC: 67 MG/DL (ref 8–23)
CALCIUM SERPL-MCNC: 8.5 MG/DL (ref 8.7–10.5)
CHLORIDE SERPL-SCNC: 107 MMOL/L (ref 95–110)
CO2 SERPL-SCNC: 18 MMOL/L (ref 23–29)
CREAT SERPL-MCNC: 2.8 MG/DL (ref 0.5–1.4)
CRP SERPL-MCNC: 97 MG/L (ref 0–8.2)
DIFFERENTIAL METHOD: ABNORMAL
EOSINOPHIL # BLD AUTO: 0 K/UL (ref 0–0.5)
EOSINOPHIL NFR BLD: 0.6 % (ref 0–8)
ERYTHROCYTE [DISTWIDTH] IN BLOOD BY AUTOMATED COUNT: 20.6 % (ref 11.5–14.5)
EST. GFR  (AFRICAN AMERICAN): 26 ML/MIN/1.73 M^2
EST. GFR  (NON AFRICAN AMERICAN): 22 ML/MIN/1.73 M^2
FERRITIN SERPL-MCNC: 307 NG/ML (ref 20–300)
GLUCOSE SERPL-MCNC: 85 MG/DL (ref 70–110)
HCT VFR BLD AUTO: 40.2 % (ref 40–54)
HGB BLD-MCNC: 12.6 G/DL (ref 14–18)
IMM GRANULOCYTES # BLD AUTO: 0.02 K/UL (ref 0–0.04)
IMM GRANULOCYTES NFR BLD AUTO: 0.4 % (ref 0–0.5)
LDH SERPL L TO P-CCNC: 454 U/L (ref 110–260)
LYMPHOCYTES # BLD AUTO: 0.9 K/UL (ref 1–4.8)
LYMPHOCYTES NFR BLD: 19.5 % (ref 18–48)
MAGNESIUM SERPL-MCNC: 1.5 MG/DL (ref 1.6–2.6)
MCH RBC QN AUTO: 24.1 PG (ref 27–31)
MCHC RBC AUTO-ENTMCNC: 31.3 G/DL (ref 32–36)
MCV RBC AUTO: 77 FL (ref 82–98)
MONOCYTES # BLD AUTO: 0.3 K/UL (ref 0.3–1)
MONOCYTES NFR BLD: 6.7 % (ref 4–15)
NEUTROPHILS # BLD AUTO: 3.4 K/UL (ref 1.8–7.7)
NEUTROPHILS NFR BLD: 72.6 % (ref 38–73)
NRBC BLD-RTO: 0 /100 WBC
PHOSPHATE SERPL-MCNC: 3.8 MG/DL (ref 2.7–4.5)
PLATELET # BLD AUTO: 127 K/UL (ref 150–350)
PMV BLD AUTO: ABNORMAL FL (ref 9.2–12.9)
POCT GLUCOSE: 120 MG/DL (ref 70–110)
POCT GLUCOSE: 162 MG/DL (ref 70–110)
POCT GLUCOSE: 86 MG/DL (ref 70–110)
POCT GLUCOSE: 90 MG/DL (ref 70–110)
POTASSIUM SERPL-SCNC: 3.9 MMOL/L (ref 3.5–5.1)
PROT SERPL-MCNC: 6.8 G/DL (ref 6–8.4)
RBC # BLD AUTO: 5.22 M/UL (ref 4.6–6.2)
SODIUM SERPL-SCNC: 137 MMOL/L (ref 136–145)
WBC # BLD AUTO: 4.66 K/UL (ref 3.9–12.7)

## 2020-06-21 PROCEDURE — 94640 AIRWAY INHALATION TREATMENT: CPT

## 2020-06-21 PROCEDURE — 94761 N-INVAS EAR/PLS OXIMETRY MLT: CPT

## 2020-06-21 PROCEDURE — 99900035 HC TECH TIME PER 15 MIN (STAT)

## 2020-06-21 PROCEDURE — 25000003 PHARM REV CODE 250: Performed by: NURSE PRACTITIONER

## 2020-06-21 PROCEDURE — 83735 ASSAY OF MAGNESIUM: CPT

## 2020-06-21 PROCEDURE — 25000003 PHARM REV CODE 250: Performed by: INTERNAL MEDICINE

## 2020-06-21 PROCEDURE — 83615 LACTATE (LD) (LDH) ENZYME: CPT

## 2020-06-21 PROCEDURE — 85025 COMPLETE CBC W/AUTO DIFF WBC: CPT

## 2020-06-21 PROCEDURE — 99233 PR SUBSEQUENT HOSPITAL CARE,LEVL III: ICD-10-PCS | Mod: CR,,, | Performed by: INTERNAL MEDICINE

## 2020-06-21 PROCEDURE — 99233 SBSQ HOSP IP/OBS HIGH 50: CPT | Mod: CR,,, | Performed by: INTERNAL MEDICINE

## 2020-06-21 PROCEDURE — 86140 C-REACTIVE PROTEIN: CPT

## 2020-06-21 PROCEDURE — 82728 ASSAY OF FERRITIN: CPT

## 2020-06-21 PROCEDURE — 63600175 PHARM REV CODE 636 W HCPCS: Performed by: HOSPITALIST

## 2020-06-21 PROCEDURE — 80053 COMPREHEN METABOLIC PANEL: CPT

## 2020-06-21 PROCEDURE — 27000221 HC OXYGEN, UP TO 24 HOURS

## 2020-06-21 PROCEDURE — 84100 ASSAY OF PHOSPHORUS: CPT

## 2020-06-21 PROCEDURE — C9399 UNCLASSIFIED DRUGS OR BIOLOG: HCPCS | Performed by: NURSE PRACTITIONER

## 2020-06-21 PROCEDURE — 21400001 HC TELEMETRY ROOM

## 2020-06-21 PROCEDURE — 25000003 PHARM REV CODE 250: Performed by: HOSPITALIST

## 2020-06-21 RX ORDER — SODIUM BICARBONATE 650 MG/1
650 TABLET ORAL 3 TIMES DAILY
Status: DISCONTINUED | OUTPATIENT
Start: 2020-06-21 | End: 2020-06-26 | Stop reason: HOSPADM

## 2020-06-21 RX ORDER — LANOLIN ALCOHOL/MO/W.PET/CERES
400 CREAM (GRAM) TOPICAL DAILY
Status: DISCONTINUED | OUTPATIENT
Start: 2020-06-21 | End: 2020-06-22

## 2020-06-21 RX ADMIN — CARVEDILOL 3.12 MG: 3.12 TABLET, FILM COATED ORAL at 05:06

## 2020-06-21 RX ADMIN — INSULIN DETEMIR 10 UNITS: 100 INJECTION, SOLUTION SUBCUTANEOUS at 09:06

## 2020-06-21 RX ADMIN — HYDRALAZINE HYDROCHLORIDE 25 MG: 25 TABLET, FILM COATED ORAL at 04:06

## 2020-06-21 RX ADMIN — ISOSORBIDE DINITRATE 10 MG: 10 TABLET ORAL at 08:06

## 2020-06-21 RX ADMIN — SODIUM BICARBONATE 650 MG: 650 TABLET ORAL at 09:06

## 2020-06-21 RX ADMIN — THERA TABS 1 TABLET: TAB at 08:06

## 2020-06-21 RX ADMIN — CEFTRIAXONE 1 G: 1 INJECTION, SOLUTION INTRAVENOUS at 11:06

## 2020-06-21 RX ADMIN — INSULIN ASPART 1 UNITS: 100 INJECTION, SOLUTION INTRAVENOUS; SUBCUTANEOUS at 09:06

## 2020-06-21 RX ADMIN — PANTOPRAZOLE SODIUM 40 MG: 40 TABLET, DELAYED RELEASE ORAL at 08:06

## 2020-06-21 RX ADMIN — ALBUTEROL SULFATE 2 PUFF: 90 AEROSOL, METERED RESPIRATORY (INHALATION) at 08:06

## 2020-06-21 RX ADMIN — DORZOLAMIDE HYDROCHLORIDE AND TIMOLOL MALEATE 1 DROP: 20; 5 SOLUTION/ DROPS OPHTHALMIC at 09:06

## 2020-06-21 RX ADMIN — ISOSORBIDE DINITRATE 10 MG: 10 TABLET ORAL at 09:06

## 2020-06-21 RX ADMIN — CARVEDILOL 3.12 MG: 3.12 TABLET, FILM COATED ORAL at 08:06

## 2020-06-21 RX ADMIN — SODIUM BICARBONATE 650 MG: 650 TABLET ORAL at 05:06

## 2020-06-21 RX ADMIN — HYDRALAZINE HYDROCHLORIDE 25 MG: 25 TABLET, FILM COATED ORAL at 06:06

## 2020-06-21 RX ADMIN — ALBUTEROL SULFATE 2 PUFF: 90 AEROSOL, METERED RESPIRATORY (INHALATION) at 03:06

## 2020-06-21 RX ADMIN — LATANOPROST 1 DROP: 50 SOLUTION OPHTHALMIC at 08:06

## 2020-06-21 RX ADMIN — HYDRALAZINE HYDROCHLORIDE 25 MG: 25 TABLET, FILM COATED ORAL at 09:06

## 2020-06-21 RX ADMIN — DOXYCYCLINE HYCLATE 100 MG: 100 TABLET, COATED ORAL at 09:06

## 2020-06-21 RX ADMIN — DOXYCYCLINE HYCLATE 100 MG: 100 TABLET, COATED ORAL at 08:06

## 2020-06-21 RX ADMIN — DORZOLAMIDE HYDROCHLORIDE AND TIMOLOL MALEATE 1 DROP: 20; 5 SOLUTION/ DROPS OPHTHALMIC at 08:06

## 2020-06-21 RX ADMIN — ISOSORBIDE DINITRATE 10 MG: 10 TABLET ORAL at 04:06

## 2020-06-21 RX ADMIN — Medication 400 MG: at 05:06

## 2020-06-21 RX ADMIN — ASPIRIN 81 MG: 81 TABLET, COATED ORAL at 06:06

## 2020-06-21 NOTE — PROGRESS NOTES
[facility] Hospital Medicine  Progress Note    Patient Name: Yan Choudhury  MRN: [id number]  Patient Class: IP- Inpatient   Admission Date: 6/17/2020  Length of Stay: 4 days  Attending Physician: Hernandez Rubio MD  Primary Care Provider: Sandra Estevez MD        Subjective:     Principal Problem:COVID-19 virus infection        HPI:  Pt is a 64 yo male with PMHx of combined CHF with EF 15 %, CKD III, DM II and ischemic cardiomyopathy who presents to the ED due to weakness. Wife provides history as patient is a difficult historian. Wife explains pt has had a decreased appetite with increasing weakness onset for approx 6 days. Wife says that Cardiology mentioned possible dehydration. Wife says that pt has not been eating or drinking regularly with increased weakness, walking slow and increased fatigue. While in the ED, nurse states that pt was tachypnic with respiratory rate 26.   On arrival: Temp 97, pulse 74, resp 22, B/P 99/56 and pulse ox 95 on RA.   CXR - Slight increase in the the vascular markings suggested, a little greater at the right base.  Mild congestion?  No overt failure.    BNP = 1874 (last admission 2300)  Labs find WBC 3.13, Na 131, serum creatinine 4.1 (baseline 2.7), glucose 119  PT IS COVID +  Pt has received a saline bolus 250 cc in the ED. Afterwards, blood pressure noted 82/62 and a second saline bolus of 250 cc given.       Overview/Hospital Course:  6/18  Patient states his breathing has improved. Renal function worsened from prior study - Nephrology has been consulted. Discussed with Cardiology. Patient will have PICC placed to measure Cardiac Output. Patient a candidate for Remdesivir pending ID review and recommendation. Patient reports SOB + Cough.     Interval History: Had 2 loose BM s today .     Review of Systems  Objective:     Vital Signs (Most Recent):  Temp: 96.2 °F (35.7 °C) (06/21/20 1135)  Pulse: 98 (06/21/20 1150)  Resp: 16 (06/21/20 1135)  BP: 110/71 (06/21/20  1135)  SpO2: 95 % (06/21/20 1135) Vital Signs (24h Range):  Temp:  [96.2 °F (35.7 °C)-99.9 °F (37.7 °C)] 96.2 °F (35.7 °C)  Pulse:  [] 98  Resp:  [16-18] 16  SpO2:  [95 %-99 %] 95 %  BP: (106-118)/(59-75) 110/71     Weight: 83.8 kg (184 lb 11.9 oz)  Body mass index is 28.09 kg/m².    Intake/Output Summary (Last 24 hours) at 6/21/2020 1325  Last data filed at 6/21/2020 0800  Gross per 24 hour   Intake 600 ml   Output 875 ml   Net -275 ml      Physical Exam   Appearance: Normal appearance.   HENT:      Head: Normocephalic and atraumatic.      Nose: Nose normal.   Eyes:      Extraocular Movements: Extraocular movements intact.      Conjunctiva/sclera: Conjunctivae normal.      Pupils: Pupils are equal, round, and reactive to light.   Neck:      Musculoskeletal: Normal range of motion and neck supple.   Cardiovascular:      Rate and Rhythm: Normal rate and regular rhythm.      Pulses: Normal pulses.      Heart sounds: Normal heart sounds.   Pulmonary:      Effort: Pulmonary effort is normal.      Breath sounds: Normal breath sounds.   Abdominal:      General: Abdomen is flat. Bowel sounds are normal.      Palpations: Abdomen is soft.   Musculoskeletal: Normal range of motion.   Skin:     General: Skin is warm and dry.   Neurological:      General: No focal deficit present.      Mental Status: He is alert and oriented to person, place, and time.  Significant Labs: All pertinent labs within the past 24 hours have been reviewed.    Significant Imaging: I have reviewed and interpreted all pertinent imaging results/findings within the past 24 hours.      Assessment/Plan:      * COVID-19 virus infection  - COVID-19 testing - positive result  - Infection Control notified      - Isolation:   - Airborne, Contact and Droplet Precautions  - Cohort patients into COVID units  - N95 masks must be fit tested, wear eye protection  - 20 second hand hygiene              - Limit visitors per hospital policy              -  Consolidating lab draws, nursing care, provider visits, and interventions     - Diagnostics: (leukopenia, hyponatremia, hyperferritinemia, elevated troponin, elevated d-dimer, age, and comorbidities are significant predictors of poor clinical outcome)  CBC, CMP, Ferritin, CRP, LDH, Blood Culture x2 and Portable CXR     - Management:  Supplemental O2 to maintain SpO2 >92%  Telemetry  Continuous/intermittent Pulse Ox  Albuterol treatment PRN  Doxycycline for infiltrate  Vitamin C, multi vitamin  Monitor labs every 48 hours  Advance Care Planning    Cont ceftriaxone and doxycycline   If he continues more diarrhea will check C.diff      Code Status - FULL CODE according to patient's wife              Acute kidney injury superimposed on CKD stage 3   Pt has good cardiac output. No JVP seen to think this is cardio-renal..     Cr getting better by holding diuretics       Sepsis   See 1         Chronic combined systolic and diastolic congestive heart failure  LFT trending up . Will check usg liver as he also has mil t bili elevation           Hypertension associated with diabetes   Cont hydralazine and Isordil         VTE Risk Mitigation (From admission, onward)         Ordered     IP VTE HIGH RISK PATIENT  Once      06/17/20 1147     Place sequential compression device  Until discontinued      06/17/20 1147                      Hernandez Rubio MD  Department of Hospital Medicine   [facility]

## 2020-06-21 NOTE — PROGRESS NOTES
[facility]   Nephrology  Progress Note    Patient Name: Yan Choudhury  MRN: [id number]  Admission Date: 6/17/2020  Hospital Length of Stay: 4 days  Attending Provider: Hernandez Rubio MD   Primary Care Physician: Sandra Estevez MD  Principal Problem:COVID-19 virus infection    Consults  Subjective:     Interval History: no acute events     Review of patient's allergies indicates:  No Known Allergies  Current Facility-Administered Medications   Medication Frequency    acetaminophen tablet 650 mg Q4H PRN    albuterol inhaler 2 puff Q8H    aspirin EC tablet 81 mg QAM    carvediloL tablet 3.125 mg BID WM    cefTRIAXone (ROCEPHIN) 1 g/50 mL D5W IVPB Q24H    dextrose 50% injection 12.5 g PRN    dextrose 50% injection 25 g PRN    dorzolamide-timolol 2-0.5% ophthalmic solution 1 drop BID    doxycycline tablet 100 mg Q12H    glucagon (human recombinant) injection 1 mg PRN    glucose chewable tablet 16 g PRN    glucose chewable tablet 24 g PRN    hydrALAZINE tablet 25 mg Q8H    insulin aspart U-100 pen 1-10 Units QID (AC + HS) PRN    insulin detemir U-100 pen 10 Units QHS    isosorbide dinitrate tablet 10 mg TID    latanoprost 0.005 % ophthalmic solution 1 drop Daily    magnesium oxide tablet 400 mg Daily    multivitamin tablet Daily    ondansetron injection 4 mg Q8H PRN    pantoprazole EC tablet 40 mg Daily    sodium chloride 0.9% flush 10 mL PRN       Objective:     Vital Signs (Most Recent):  Temp: 97.9 °F (36.6 °C) (06/21/20 1618)  Pulse: 106 (06/21/20 1618)  Resp: (!) 22 (06/21/20 1618)  BP: 111/71 (06/21/20 1618)  SpO2: (!) 94 % (06/21/20 1618)  O2 Device (Oxygen Therapy): nasal cannula (06/21/20 1526) Vital Signs (24h Range):  Temp:  [96.2 °F (35.7 °C)-99.9 °F (37.7 °C)] 97.9 °F (36.6 °C)  Pulse:  [] 106  Resp:  [16-22] 22  SpO2:  [94 %-96 %] 94 %  BP: (106-118)/(64-75) 111/71     Weight: 83.8 kg (184 lb 11.9 oz) (06/21/20 0331)  Body mass index is 28.09 kg/m².  Body surface area  is 2.01 meters squared.    I/O last 3 completed shifts:  In: 1350 [P.O.:1350]  Out: 550 [Urine:550]    Due to  COVID-19 quarantine needs  this note was prepared without a physical examination.     direct patient communication occurred via  electronic measures  Objective data ( lab results, imaging studies, medications, etc) were reviewed in detail.        Significant Labs:sure  CBC:   Recent Labs   Lab 06/21/20  0620   WBC 4.66   RBC 5.22   HGB 12.6*   HCT 40.2   *   MCV 77*   MCH 24.1*   MCHC 31.3*     CMP:   Recent Labs   Lab 06/21/20  0856   GLU 85   CALCIUM 8.5*   ALBUMIN 2.3*   PROT 6.8      K 3.9   CO2 18*      BUN 67*   CREATININE 2.8*   ALKPHOS 102   *   *   BILITOT 1.3*     Coagulation:   Recent Labs   Lab 06/17/20  0830   INR 1.1     LFTs:   Recent Labs   Lab 06/21/20  0856   *   *   ALKPHOS 102   BILITOT 1.3*   PROT 6.8   ALBUMIN 2.3*     All labs within the past 24 hours have been reviewed.    Significant Imaging: reviewed     Assessment/Plan:     Active Diagnoses:    Diagnosis Date Noted POA    PRINCIPAL PROBLEM:  COVID-19 virus infection [U07.1] 06/17/2020 Yes    Prerenal acute renal failure [N17.9]  Yes    Sepsis [A41.9] 06/17/2020 Yes    Acute kidney injury superimposed on CKD [N17.9, N18.9] 06/17/2020 Yes    Stage 4 chronic kidney disease [N18.4] 05/22/2015 Yes    Chronic combined systolic and diastolic congestive heart failure [I50.42] 03/24/2015 Yes    Hypertension associated with diabetes [E11.59, I10]  Yes      Problems Resolved During this Admission:     1. BROOKE on CKD stage 4 :  Baseline serum creatinine fluctuates between 2.5 and 3 mg/dL, acute kidney injury is from dehydration on admission, his diuretics are currently on hold, renal function is back to baseline,    2.  Hypertension, blood pressure is controlled, monitor,    3.  Anemia, multifactorial, stable hemoglobin, monitor    4.  COVID infection, clinically improving per primary team,  will follow    5.  Abnormal LFTs, monitor at this time,    6.  Hypomagnesemia, will start magnesium oxide,    7.  Metabolic acidosis ,  start sodium bicarbonate supplements,      Thank you for your consult. I will follow-up with patient. Please contact us if you have any additional questions.     Total time spent  40 minutes including time needed to review the records,  patient  evaluation, documentation, face-to-face discussion with the patient, primary team, more than 50% of the time was spent on coordination of care and counseling.       Hernandez Martinez MD  Nephrology  [facility]

## 2020-06-21 NOTE — PLAN OF CARE
Plan of care discussed with Yan Choudhury, understanding verbalizes understanding.     Neuro: AAO X3 . Disoriented to time  Resp: Breath sounds coarse.  Cardiac: V-paced,  on monitor.  GI/: WDL   Skin: intact. Wounds: NA  Activity: standby, up to bathroom    BP:(!) 106/59   P:84   R:18 - O2sat:99%   Temp:98.7 °F (37.1 °C) (Axillary)     Recent Labs   Lab 06/20/20  1712   POCTGLUCOSE 76        Shift Summary:   Patient had antibiotcs switched. Watching kindey and liver function. Patient doing well on 2L O2. Patient reports being very tired and having no appetite. Encouraged PO liquid intake. Patient educated on fall precautions. Patient does not call for help and ambulates to bathroom. BSC offered, but not used. No significant events during shift.      Problem: Fall Injury Risk  Goal: Absence of Fall and Fall-Related Injury  Outcome: Ongoing, Progressing     Problem: Adult Inpatient Plan of Care  Goal: Plan of Care Review  Outcome: Ongoing, Progressing  Goal: Patient-Specific Goal (Individualization)  Outcome: Ongoing, Progressing  Goal: Absence of Hospital-Acquired Illness or Injury  Outcome: Ongoing, Progressing  Goal: Optimal Comfort and Wellbeing  Outcome: Ongoing, Progressing  Goal: Readiness for Transition of Care  Outcome: Ongoing, Progressing  Goal: Rounds/Family Conference  Outcome: Ongoing, Progressing     Problem: Diabetes Comorbidity  Goal: Blood Glucose Level Within Desired Range  Outcome: Ongoing, Progressing     Problem: Adjustment to Illness (Sepsis/Septic Shock)  Goal: Optimal Coping  Outcome: Ongoing, Progressing     Problem: Bleeding (Sepsis/Septic Shock)  Goal: Absence of Bleeding  Outcome: Ongoing, Progressing     Problem: Glycemic Control Impaired (Sepsis/Septic Shock)  Goal: Blood Glucose Level Within Desired Range  Outcome: Ongoing, Progressing     Problem: Hemodynamic Instability (Sepsis/Septic Shock)  Goal: Effective Tissue Perfusion  Outcome: Ongoing, Progressing     Problem: Infection  (Sepsis/Septic Shock)  Goal: Absence of Infection Signs/Symptoms  Outcome: Ongoing, Progressing     Problem: Nutrition Impaired (Sepsis/Septic Shock)  Goal: Optimal Nutrition Intake  Outcome: Ongoing, Progressing     Problem: Respiratory Compromise (Sepsis/Septic Shock)  Goal: Effective Oxygenation and Ventilation  Outcome: Ongoing, Progressing     Problem: Electrolyte Imbalance (Acute Kidney Injury/Impairment)  Goal: Serum Electrolyte Balance  Outcome: Ongoing, Progressing     Problem: Fluid Imbalance (Acute Kidney Injury/Impairment)  Goal: Optimal Fluid Balance  Outcome: Ongoing, Progressing     Problem: Hematologic Alteration (Acute Kidney Injury/Impairment)  Goal: Hemoglobin, Hematocrit and Platelets Within Normal Range  Outcome: Ongoing, Progressing     Problem: Oral Intake Inadequate (Acute Kidney Injury/Impairment)  Goal: Optimal Nutrition Intake  Outcome: Ongoing, Progressing     Problem: Renal Function Impairment (Acute Kidney Injury/Impairment)  Goal: Effective Renal Function  Outcome: Ongoing, Progressing     Problem: Infection  Goal: Infection Symptom Resolution  Outcome: Ongoing, Progressing

## 2020-06-21 NOTE — SUBJECTIVE & OBJECTIVE
Interval History: Had 2 loose BM s today .     Review of Systems  Objective:     Vital Signs (Most Recent):  Temp: 96.2 °F (35.7 °C) (06/21/20 1135)  Pulse: 98 (06/21/20 1150)  Resp: 16 (06/21/20 1135)  BP: 110/71 (06/21/20 1135)  SpO2: 95 % (06/21/20 1135) Vital Signs (24h Range):  Temp:  [96.2 °F (35.7 °C)-99.9 °F (37.7 °C)] 96.2 °F (35.7 °C)  Pulse:  [] 98  Resp:  [16-18] 16  SpO2:  [95 %-99 %] 95 %  BP: (106-118)/(59-75) 110/71     Weight: 83.8 kg (184 lb 11.9 oz)  Body mass index is 28.09 kg/m².    Intake/Output Summary (Last 24 hours) at 6/21/2020 1325  Last data filed at 6/21/2020 0800  Gross per 24 hour   Intake 600 ml   Output 875 ml   Net -275 ml      Physical Exam   Appearance: Normal appearance.   HENT:      Head: Normocephalic and atraumatic.      Nose: Nose normal.   Eyes:      Extraocular Movements: Extraocular movements intact.      Conjunctiva/sclera: Conjunctivae normal.      Pupils: Pupils are equal, round, and reactive to light.   Neck:      Musculoskeletal: Normal range of motion and neck supple.   Cardiovascular:      Rate and Rhythm: Normal rate and regular rhythm.      Pulses: Normal pulses.      Heart sounds: Normal heart sounds.   Pulmonary:      Effort: Pulmonary effort is normal.      Breath sounds: Normal breath sounds.   Abdominal:      General: Abdomen is flat. Bowel sounds are normal.      Palpations: Abdomen is soft.   Musculoskeletal: Normal range of motion.   Skin:     General: Skin is warm and dry.   Neurological:      General: No focal deficit present.      Mental Status: He is alert and oriented to person, place, and time.  Significant Labs: All pertinent labs within the past 24 hours have been reviewed.    Significant Imaging: I have reviewed and interpreted all pertinent imaging results/findings within the past 24 hours.

## 2020-06-21 NOTE — PLAN OF CARE
Problem: Adult Inpatient Plan of Care  Goal: Plan of Care Review  Outcome: Ongoing, Progressing  Pt AAO to person, place, and situation; disoriented to time. VSS. Pt remained free of falls this shift. No complaints of pain or discomfort. Medications administered as ordered. Pt is Paced on monitor. PICC intact, saline locked. 2L O2 via NC. Bed alarm set. Hourly rounding completed. Pt instructed to call for assistance. POC reviewed. Pt verbalized understanding. Will continue to monitor.

## 2020-06-22 LAB
ALBUMIN SERPL BCP-MCNC: 2.1 G/DL (ref 3.5–5.2)
ALP SERPL-CCNC: 92 U/L (ref 55–135)
ALT SERPL W/O P-5'-P-CCNC: 120 U/L (ref 10–44)
ANION GAP SERPL CALC-SCNC: 10 MMOL/L (ref 8–16)
AST SERPL-CCNC: 237 U/L (ref 10–40)
BACTERIA BLD CULT: NORMAL
BACTERIA BLD CULT: NORMAL
BILIRUB SERPL-MCNC: 1 MG/DL (ref 0.1–1)
BUN SERPL-MCNC: 73 MG/DL (ref 8–23)
CALCIUM SERPL-MCNC: 8.4 MG/DL (ref 8.7–10.5)
CHLORIDE SERPL-SCNC: 108 MMOL/L (ref 95–110)
CO2 SERPL-SCNC: 20 MMOL/L (ref 23–29)
CREAT SERPL-MCNC: 3 MG/DL (ref 0.5–1.4)
EST. GFR  (AFRICAN AMERICAN): 24 ML/MIN/1.73 M^2
EST. GFR  (NON AFRICAN AMERICAN): 21 ML/MIN/1.73 M^2
GLUCOSE SERPL-MCNC: 105 MG/DL (ref 70–110)
POCT GLUCOSE: 105 MG/DL (ref 70–110)
POCT GLUCOSE: 106 MG/DL (ref 70–110)
POCT GLUCOSE: 98 MG/DL (ref 70–110)
POCT GLUCOSE: 99 MG/DL (ref 70–110)
POTASSIUM SERPL-SCNC: 3.9 MMOL/L (ref 3.5–5.1)
PROT SERPL-MCNC: 6.4 G/DL (ref 6–8.4)
SODIUM SERPL-SCNC: 138 MMOL/L (ref 136–145)

## 2020-06-22 PROCEDURE — 94761 N-INVAS EAR/PLS OXIMETRY MLT: CPT

## 2020-06-22 PROCEDURE — 63600175 PHARM REV CODE 636 W HCPCS: Performed by: HOSPITALIST

## 2020-06-22 PROCEDURE — 96372 THER/PROPH/DIAG INJ SC/IM: CPT

## 2020-06-22 PROCEDURE — 80053 COMPREHEN METABOLIC PANEL: CPT

## 2020-06-22 PROCEDURE — 99233 PR SUBSEQUENT HOSPITAL CARE,LEVL III: ICD-10-PCS | Mod: CR,,, | Performed by: INTERNAL MEDICINE

## 2020-06-22 PROCEDURE — 21400001 HC TELEMETRY ROOM

## 2020-06-22 PROCEDURE — 25000003 PHARM REV CODE 250: Performed by: NURSE PRACTITIONER

## 2020-06-22 PROCEDURE — 25000003 PHARM REV CODE 250: Performed by: HOSPITALIST

## 2020-06-22 PROCEDURE — 94640 AIRWAY INHALATION TREATMENT: CPT

## 2020-06-22 PROCEDURE — C9399 UNCLASSIFIED DRUGS OR BIOLOG: HCPCS | Performed by: NURSE PRACTITIONER

## 2020-06-22 PROCEDURE — 25000003 PHARM REV CODE 250: Performed by: INTERNAL MEDICINE

## 2020-06-22 PROCEDURE — 27000221 HC OXYGEN, UP TO 24 HOURS

## 2020-06-22 PROCEDURE — 99233 SBSQ HOSP IP/OBS HIGH 50: CPT | Mod: CR,,, | Performed by: INTERNAL MEDICINE

## 2020-06-22 PROCEDURE — 94760 N-INVAS EAR/PLS OXIMETRY 1: CPT

## 2020-06-22 RX ORDER — LANOLIN ALCOHOL/MO/W.PET/CERES
400 CREAM (GRAM) TOPICAL 2 TIMES DAILY
Status: DISCONTINUED | OUTPATIENT
Start: 2020-06-22 | End: 2020-06-26 | Stop reason: HOSPADM

## 2020-06-22 RX ADMIN — DORZOLAMIDE HYDROCHLORIDE AND TIMOLOL MALEATE 1 DROP: 20; 5 SOLUTION/ DROPS OPHTHALMIC at 09:06

## 2020-06-22 RX ADMIN — Medication 400 MG: at 08:06

## 2020-06-22 RX ADMIN — SODIUM BICARBONATE 650 MG: 650 TABLET ORAL at 08:06

## 2020-06-22 RX ADMIN — CARVEDILOL 3.12 MG: 3.12 TABLET, FILM COATED ORAL at 05:06

## 2020-06-22 RX ADMIN — ISOSORBIDE DINITRATE 10 MG: 10 TABLET ORAL at 02:06

## 2020-06-22 RX ADMIN — INSULIN DETEMIR 10 UNITS: 100 INJECTION, SOLUTION SUBCUTANEOUS at 08:06

## 2020-06-22 RX ADMIN — CEFTRIAXONE 1 G: 1 INJECTION, SOLUTION INTRAVENOUS at 11:06

## 2020-06-22 RX ADMIN — HYDRALAZINE HYDROCHLORIDE 25 MG: 25 TABLET, FILM COATED ORAL at 02:06

## 2020-06-22 RX ADMIN — LATANOPROST 1 DROP: 50 SOLUTION OPHTHALMIC at 08:06

## 2020-06-22 RX ADMIN — ALBUTEROL SULFATE 2 PUFF: 90 AEROSOL, METERED RESPIRATORY (INHALATION) at 08:06

## 2020-06-22 RX ADMIN — ALBUTEROL SULFATE 2 PUFF: 90 AEROSOL, METERED RESPIRATORY (INHALATION) at 12:06

## 2020-06-22 RX ADMIN — ALBUTEROL SULFATE 2 PUFF: 90 AEROSOL, METERED RESPIRATORY (INHALATION) at 04:06

## 2020-06-22 RX ADMIN — ISOSORBIDE DINITRATE 10 MG: 10 TABLET ORAL at 08:06

## 2020-06-22 RX ADMIN — HYDRALAZINE HYDROCHLORIDE 25 MG: 25 TABLET, FILM COATED ORAL at 06:06

## 2020-06-22 RX ADMIN — DORZOLAMIDE HYDROCHLORIDE AND TIMOLOL MALEATE 1 DROP: 20; 5 SOLUTION/ DROPS OPHTHALMIC at 08:06

## 2020-06-22 RX ADMIN — THERA TABS 1 TABLET: TAB at 08:06

## 2020-06-22 RX ADMIN — ASPIRIN 81 MG: 81 TABLET, COATED ORAL at 06:06

## 2020-06-22 RX ADMIN — DOXYCYCLINE HYCLATE 100 MG: 100 TABLET, COATED ORAL at 08:06

## 2020-06-22 RX ADMIN — HYDRALAZINE HYDROCHLORIDE 25 MG: 25 TABLET, FILM COATED ORAL at 09:06

## 2020-06-22 RX ADMIN — SODIUM BICARBONATE 650 MG: 650 TABLET ORAL at 02:06

## 2020-06-22 NOTE — SUBJECTIVE & OBJECTIVE
Interval History:     6/22/20: Creatinine has stabilized at 3.         Review of patient's allergies indicates:  No Known Allergies  Current Facility-Administered Medications   Medication Frequency    acetaminophen tablet 650 mg Q4H PRN    albuterol inhaler 2 puff Q8H    aspirin EC tablet 81 mg QAM    carvediloL tablet 3.125 mg BID WM    cefTRIAXone (ROCEPHIN) 1 g/50 mL D5W IVPB Q24H    dextrose 50% injection 12.5 g PRN    dextrose 50% injection 25 g PRN    dorzolamide-timolol 2-0.5% ophthalmic solution 1 drop BID    glucagon (human recombinant) injection 1 mg PRN    glucose chewable tablet 16 g PRN    glucose chewable tablet 24 g PRN    hydrALAZINE tablet 25 mg Q8H    insulin aspart U-100 pen 1-10 Units QID (AC + HS) PRN    insulin detemir U-100 pen 10 Units QHS    isosorbide dinitrate tablet 10 mg TID    latanoprost 0.005 % ophthalmic solution 1 drop Daily    magnesium oxide tablet 400 mg Daily    multivitamin tablet Daily    ondansetron injection 4 mg Q8H PRN    pantoprazole EC tablet 40 mg Daily    sodium bicarbonate tablet 650 mg TID    sodium chloride 0.9% flush 10 mL PRN       Objective:     Vital Signs (Most Recent):  Temp: 96.3 °F (35.7 °C) (06/22/20 1200)  Pulse: 107 (06/22/20 1200)  Resp: 20 (06/22/20 1200)  BP: 110/74 (06/22/20 1200)  SpO2: 96 % (06/22/20 1200)  O2 Device (Oxygen Therapy): nasal cannula (06/22/20 0838) Vital Signs (24h Range):  Temp:  [96.3 °F (35.7 °C)-99.7 °F (37.6 °C)] 96.3 °F (35.7 °C)  Pulse:  [] 107  Resp:  [18-22] 20  SpO2:  [93 %-96 %] 96 %  BP: ()/(60-74) 110/74     Weight: 83.8 kg (184 lb 11.9 oz) (06/21/20 0331)  Body mass index is 28.09 kg/m².  Body surface area is 2.01 meters squared.    I/O last 3 completed shifts:  In: 766 [P.O.:716; IV Piggyback:50]  Out: 625 [Urine:625]    Physical Exam   Deferred because of Covid + state.     Significant Labs:  Lab Results   Component Value Date    CREATININE 3.0 (H) 06/22/2020    BUN 73 (H) 06/22/2020      06/22/2020    K 3.9 06/22/2020     06/22/2020    CO2 20 (L) 06/22/2020     Lab Results   Component Value Date    PTH 88.0 (H) 10/08/2019    CALCIUM 8.4 (L) 06/22/2020    PHOS 3.8 06/21/2020     Lab Results   Component Value Date    ALBUMIN 2.1 (L) 06/22/2020     Lab Results   Component Value Date    WBC 4.66 06/21/2020    HGB 12.6 (L) 06/21/2020    HCT 40.2 06/21/2020    MCV 77 (L) 06/21/2020     (L) 06/21/2020       Recent Labs   Lab 06/21/20  0620   MG 1.5*         Significant Imaging:  Imaging Results          X-Ray Chest AP Portable (Final result)  Result time 06/17/20 09:11:42    Final result by Rhys Blas III, MD (06/17/20 09:11:42)                 Impression:      Slight increase in the the vascular markings suggested, a little greater at the right base.  Mild congestion?  No overt failure.  Follow-up recommended      Electronically signed by: Rhys Blas MD  Date:    06/17/2020  Time:    09:11             Narrative:    EXAMINATION:  XR CHEST AP PORTABLE    CLINICAL HISTORY:  CHF;    COMPARISON:  May 28th    FINDINGS:  The heart size remains enlarged with a permanent bipolar pacing device in position on the left.  The lungs show no consolidation or effusion.  Slight increase in the vascular markings suggested, a little greater at the right base.

## 2020-06-22 NOTE — ASSESSMENT & PLAN NOTE
Above baseline  Appears volume overload  Hold ACE/ARB  Gentle IV hydration    6/18  Nephrology  Monitor  Gentle Hydration  Hydralazine    6/22  Nephrology recs appreciated

## 2020-06-22 NOTE — ASSESSMENT & PLAN NOTE
Serum creatinine 4.1 (baseline 2.7)  Avoid nephrotoxic meds  Will hold Bumex and Metolazone for now as pt appears volume depleted  Gentle IV hydration  Hold ACE/ARB combination drug    6/18  Nephrology on consult  Hold Nephrotoxic meds    6/22  Likely reached baseline Cr 3  Renal Following, no indication for HD now   Avoid Nephrotoxins

## 2020-06-22 NOTE — ASSESSMENT & PLAN NOTE
Borderline low blood pressure  Hold antihypertensives for now  Gentle IV hydration - spoke with Cardiology, Dr. Pantoja, who recommended gentle IV hydration    6/22  Monitor BP  Meds held this morning due to low BP

## 2020-06-22 NOTE — ASSESSMENT & PLAN NOTE
- COVID-19 testing - positive result  - Infection Control notified     - Isolation:   - Airborne, Contact and Droplet Precautions  - Cohort patients into COVID units  - N95 masks must be fit tested, wear eye protection  - 20 second hand hygiene              - Limit visitors per hospital policy              - Consolidating lab draws, nursing care, provider visits, and interventions    - Diagnostics: (leukopenia, hyponatremia, hyperferritinemia, elevated troponin, elevated d-dimer, age, and comorbidities are significant predictors of poor clinical outcome)  CBC, CMP, Ferritin, CRP, LDH, Blood Culture x2 and Portable CXR    - Management:  Supplemental O2 to maintain SpO2 >92%  Telemetry  Continuous/intermittent Pulse Ox  Albuterol treatment PRN  Ceft for infiltrate  Vitamin C, multi vitamin  Monitor labs every 48 hours  Advance Care Planning     Code Status - FULL CODE according to patient's wife

## 2020-06-22 NOTE — ASSESSMENT & PLAN NOTE
Criteria:  RR rate > 20, WBC 3.13  Lactic acid normal  B/P appropriate on arrival but did decline to 82/62 as pt is volume depleted  Blood cultures drawn  Normal saline 500 cc bolus given  Doxycyline given    6/18  Responding to gentle hydration  Monitor  Follow Cultures  ID    6/22  On Ceft  Culture NGTD

## 2020-06-22 NOTE — ASSESSMENT & PLAN NOTE
BROOKE on CKD stage 4.  No indications for acute or chronic HD at present.  Creatinine has now stabilized at 3.   Diuretics were held. Encourage po intake. Avoid NSAIDs, ACE-I, ARB.

## 2020-06-22 NOTE — PLAN OF CARE
Plan of care discussed with Yan Choudhury, understanding verbalized, but needs reinforcement.     Neuro: AAO X3 . Intermittently disoriented, especially to time.  Resp: Breath sounds dimished.  Cardiac: AV paced, rate , mostly V paced,  on monitor.  GI/: WDL   Skin: WDL. Wounds: NA  Activity:x1 assist, up to bathroom. Patient does not call, fall risk high. Unsteady, weak gait.    BP:110/68   P:102   R:(!) 22 - O2sat:(!) 94%   Temp:97.9 °F (36.6 °C) (Oral)     Recent Labs   Lab 06/21/20  1728   POCTGLUCOSE 120*        Shift Summary:   LD, LFTs, kidney function worsening. US of abdomen ordered to check for transaminitis. Patient more rested today, but intermittently confused on details. Blood glucose did not need any SSI. Patient encouraged to eat more. BP more stable today.

## 2020-06-22 NOTE — PROGRESS NOTES
[facility] Hospital Medicine  Progress Note    Patient Name: Yan Choudhury  MRN: [id number]  Patient Class: IP- Inpatient   Admission Date: 6/17/2020  Length of Stay: 5 days  Attending Physician: Ney Conteh MD  Primary Care Provider: Sandra Estevez MD        Subjective:     Principal Problem:COVID-19 virus infection        HPI:  Pt is a 66 yo male with PMHx of combined CHF with EF 15 %, CKD III, DM II and ischemic cardiomyopathy who presents to the ED due to weakness. Wife provides history as patient is a difficult historian. Wife explains pt has had a decreased appetite with increasing weakness onset for approx 6 days. Wife says that Cardiology mentioned possible dehydration. Wife says that pt has not been eating or drinking regularly with increased weakness, walking slow and increased fatigue. While in the ED, nurse states that pt was tachypnic with respiratory rate 26.   On arrival: Temp 97, pulse 74, resp 22, B/P 99/56 and pulse ox 95 on RA.   CXR - Slight increase in the the vascular markings suggested, a little greater at the right base.  Mild congestion?  No overt failure.    BNP = 1874 (last admission 2300)  Labs find WBC 3.13, Na 131, serum creatinine 4.1 (baseline 2.7), glucose 119  PT IS COVID +  Pt has received a saline bolus 250 cc in the ED. Afterwards, blood pressure noted 82/62 and a second saline bolus of 250 cc given.       Overview/Hospital Course:  6/18  Patient states his breathing has improved. Renal function worsened from prior study - Nephrology has been consulted. Discussed with Cardiology. Patient will have PICC placed to measure Cardiac Output. Patient a candidate for Remdesivir pending ID review and recommendation. Patient reports SOB + Cough.   6/22- Patient seen at bedside, resting comfortably. Continues to improves does not report any acute complaints.     Interval History: Had 2 loose BM s today .     Review of Systems   Constitutional: Negative for appetite  change, chills, diaphoresis, fatigue, fever and unexpected weight change.   HENT: Negative for congestion, ear discharge, ear pain, hearing loss, nosebleeds, postnasal drip, sinus pressure, sinus pain, sneezing, sore throat, tinnitus, trouble swallowing and voice change.    Eyes: Negative for pain, discharge, redness and itching.   Respiratory: Negative for cough, chest tightness, shortness of breath and wheezing.    Cardiovascular: Negative for chest pain, palpitations and leg swelling.   Gastrointestinal: Negative for abdominal distention, abdominal pain, blood in stool, constipation, diarrhea, nausea, rectal pain and vomiting.   Endocrine: Negative for cold intolerance, heat intolerance, polydipsia, polyphagia and polyuria.   Genitourinary: Negative for decreased urine volume, difficulty urinating, discharge, dysuria, flank pain, frequency, hematuria, penile pain, scrotal swelling, testicular pain and urgency.   Musculoskeletal: Negative for arthralgias, back pain, joint swelling, myalgias and neck pain.   Skin: Negative for pallor and rash.   Neurological: Negative for dizziness, tremors, weakness, light-headedness, numbness and headaches.   Hematological: Negative for adenopathy. Does not bruise/bleed easily.   Psychiatric/Behavioral: Negative for agitation, confusion, sleep disturbance and suicidal ideas. The patient is not nervous/anxious.    All other systems reviewed and are negative.    Objective:     Vital Signs (Most Recent):  Temp: 96.8 °F (36 °C) (06/22/20 1620)  Pulse: (!) 115 (06/22/20 1634)  Resp: 20 (06/22/20 1634)  BP: 117/71 (06/22/20 1620)  SpO2: (!) 93 % (06/22/20 1620) Vital Signs (24h Range):  Temp:  [96.3 °F (35.7 °C)-99.7 °F (37.6 °C)] 96.8 °F (36 °C)  Pulse:  [] 115  Resp:  [18-22] 20  SpO2:  [93 %-96 %] 93 %  BP: ()/(60-74) 117/71     Weight: 83.8 kg (184 lb 11.9 oz)  Body mass index is 28.09 kg/m².    Intake/Output Summary (Last 24 hours) at 6/22/2020 8028  Last data filed at  6/22/2020 0400  Gross per 24 hour   Intake 240 ml   Output --   Net 240 ml      Physical Exam     Appearance: Normal appearance.   HENT:      Head: Normocephalic and atraumatic.      Nose: Nose normal.   Eyes:      Extraocular Movements: Extraocular movements intact.      Conjunctiva/sclera: Conjunctivae normal.      Pupils: Pupils are equal, round, and reactive to light.   Neck:      Musculoskeletal: Normal range of motion and neck supple.   Cardiovascular:      Rate and Rhythm: Normal rate and regular rhythm.      Pulses: Normal pulses.      Heart sounds: Normal heart sounds.   Pulmonary:      Effort: Pulmonary effort is normal.      Breath sounds: Normal breath sounds.   Abdominal:      General: Abdomen is flat. Bowel sounds are normal.      Palpations: Abdomen is soft.   Musculoskeletal: Normal range of motion.   Skin:     General: Skin is warm and dry.   Neurological:      General: No focal deficit present.      Mental Status: He is alert and oriented to person, place, and time.  Significant Labs: All pertinent labs within the past 24 hours have been reviewed.    Significant Imaging: I have reviewed and interpreted all pertinent imaging results/findings within the past 24 hours.      Assessment/Plan:      * COVID-19 virus infection  - COVID-19 testing - positive result  - Infection Control notified     - Isolation:   - Airborne, Contact and Droplet Precautions  - Cohort patients into COVID units  - N95 masks must be fit tested, wear eye protection  - 20 second hand hygiene              - Limit visitors per hospital policy              - Consolidating lab draws, nursing care, provider visits, and interventions    - Diagnostics: (leukopenia, hyponatremia, hyperferritinemia, elevated troponin, elevated d-dimer, age, and comorbidities are significant predictors of poor clinical outcome)  CBC, CMP, Ferritin, CRP, LDH, Blood Culture x2 and Portable CXR    - Management:  Supplemental O2 to maintain SpO2  >92%  Telemetry  Continuous/intermittent Pulse Ox  Albuterol treatment PRN  Ceft for infiltrate  Vitamin C, multi vitamin  Monitor labs every 48 hours  Advance Care Planning     Code Status - FULL CODE according to patient's wife             Acute kidney injury superimposed on CKD  Serum creatinine 4.1 (baseline 2.7)  Avoid nephrotoxic meds  Will hold Bumex and Metolazone for now as pt appears volume depleted  Gentle IV hydration  Hold ACE/ARB combination drug    6/18  Nephrology on consult  Hold Nephrotoxic meds    6/22  Likely reached baseline Cr 3  Renal Following, no indication for HD now   Avoid Nephrotoxins        Sepsis  Criteria:  RR rate > 20, WBC 3.13  Lactic acid normal  B/P appropriate on arrival but did decline to 82/62 as pt is volume depleted  Blood cultures drawn  Normal saline 500 cc bolus given  Doxycyline given    6/18  Responding to gentle hydration  Monitor  Follow Cultures  ID    6/22  On Ceft  Culture NGTD         Acidosis, metabolic  6/22  2/2 BROOKE   Monitor       Hypomagnesemia  6/22  Monitor and Replete       Stage 4 chronic kidney disease  Above baseline  Appears volume overload  Hold ACE/ARB  Gentle IV hydration    6/18  Nephrology  Monitor  Gentle Hydration  Hydralazine    6/22  Nephrology recs appreciated     Chronic combined systolic and diastolic congestive heart failure  EF 15 % and grade II diastolic dysfunction  Compensated currently  Pt appears volume depleted likely from poor appetite secondary to viral illness  Will hold ACE/ARB, Bumex and metolazone  Gentle IV hydration  Monitor closely for S/S of volume overload    6/18  Gentle Hydration  PICC placement  Cardiology    6/22  Not in fluid overload  Daily wts  Fluid restriction <1.5L   On BB, Iso+Hydra  Cardiology recs appreciated         Hypertension associated with diabetes  Borderline low blood pressure  Hold antihypertensives for now  Gentle IV hydration - spoke with Cardiology, Dr. Pantoja, who recommended gentle IV  hydration    6/22  Monitor BP  Meds held this morning due to low BP         VTE Risk Mitigation (From admission, onward)         Ordered     IP VTE HIGH RISK PATIENT  Once      06/17/20 1147     Place sequential compression device  Until discontinued      06/17/20 1147                      Ney Conteh MD  Department of Hospital Medicine   [facility]

## 2020-06-22 NOTE — PLAN OF CARE
POC reviewed with pt. Pt verbalizes understanding of POC. No questions at this time.  AAOx3. NADN.  AV paced on cardiac monitor.  PICC to RUE. Flushed without difficulty.  Pt remains free of falls.  No complaints at this time. Pt sleeping with covers over head between care.  Safety measures in place. Will continue to monitor.  Informed pt to call for assistance before getting up. Pt verbalizes understanding.

## 2020-06-22 NOTE — PLAN OF CARE
Plan of care reviewed with patient at bedside; verbalized understanding/needs reinforcement.   Pt is AAOX3 and periodically hypotensive.   Pt remained afebrile throughout this shift.  Still does not show interest in eating or drinking. Encouraged po intake this evening.    AC/HS blood glucose monitoring; ISS per orders.  Pt denies pain this shift.  Pt moving/turnng independent. Frequent weight shifting encouraged.  Bed low, side rails up x 2, wheels locked, call light in reach.  PICC intact and patent. Cardiac monitor in place.  No c/o or questions at this time.   Will continue to monitor.

## 2020-06-22 NOTE — SUBJECTIVE & OBJECTIVE
Interval History: Had 2 loose BM s today .     Review of Systems   Constitutional: Negative for appetite change, chills, diaphoresis, fatigue, fever and unexpected weight change.   HENT: Negative for congestion, ear discharge, ear pain, hearing loss, nosebleeds, postnasal drip, sinus pressure, sinus pain, sneezing, sore throat, tinnitus, trouble swallowing and voice change.    Eyes: Negative for pain, discharge, redness and itching.   Respiratory: Negative for cough, chest tightness, shortness of breath and wheezing.    Cardiovascular: Negative for chest pain, palpitations and leg swelling.   Gastrointestinal: Negative for abdominal distention, abdominal pain, blood in stool, constipation, diarrhea, nausea, rectal pain and vomiting.   Endocrine: Negative for cold intolerance, heat intolerance, polydipsia, polyphagia and polyuria.   Genitourinary: Negative for decreased urine volume, difficulty urinating, discharge, dysuria, flank pain, frequency, hematuria, penile pain, scrotal swelling, testicular pain and urgency.   Musculoskeletal: Negative for arthralgias, back pain, joint swelling, myalgias and neck pain.   Skin: Negative for pallor and rash.   Neurological: Negative for dizziness, tremors, weakness, light-headedness, numbness and headaches.   Hematological: Negative for adenopathy. Does not bruise/bleed easily.   Psychiatric/Behavioral: Negative for agitation, confusion, sleep disturbance and suicidal ideas. The patient is not nervous/anxious.    All other systems reviewed and are negative.    Objective:     Vital Signs (Most Recent):  Temp: 96.8 °F (36 °C) (06/22/20 1620)  Pulse: (!) 115 (06/22/20 1634)  Resp: 20 (06/22/20 1634)  BP: 117/71 (06/22/20 1620)  SpO2: (!) 93 % (06/22/20 1620) Vital Signs (24h Range):  Temp:  [96.3 °F (35.7 °C)-99.7 °F (37.6 °C)] 96.8 °F (36 °C)  Pulse:  [] 115  Resp:  [18-22] 20  SpO2:  [93 %-96 %] 93 %  BP: ()/(60-74) 117/71     Weight: 83.8 kg (184 lb 11.9 oz)  Body  mass index is 28.09 kg/m².    Intake/Output Summary (Last 24 hours) at 6/22/2020 1637  Last data filed at 6/22/2020 0400  Gross per 24 hour   Intake 240 ml   Output --   Net 240 ml      Physical Exam     Appearance: Normal appearance.   HENT:      Head: Normocephalic and atraumatic.      Nose: Nose normal.   Eyes:      Extraocular Movements: Extraocular movements intact.      Conjunctiva/sclera: Conjunctivae normal.      Pupils: Pupils are equal, round, and reactive to light.   Neck:      Musculoskeletal: Normal range of motion and neck supple.   Cardiovascular:      Rate and Rhythm: Normal rate and regular rhythm.      Pulses: Normal pulses.      Heart sounds: Normal heart sounds.   Pulmonary:      Effort: Pulmonary effort is normal.      Breath sounds: Normal breath sounds.   Abdominal:      General: Abdomen is flat. Bowel sounds are normal.      Palpations: Abdomen is soft.   Musculoskeletal: Normal range of motion.   Skin:     General: Skin is warm and dry.   Neurological:      General: No focal deficit present.      Mental Status: He is alert and oriented to person, place, and time.  Significant Labs: All pertinent labs within the past 24 hours have been reviewed.    Significant Imaging: I have reviewed and interpreted all pertinent imaging results/findings within the past 24 hours.

## 2020-06-22 NOTE — ASSESSMENT & PLAN NOTE
EF 15 % and grade II diastolic dysfunction  Compensated currently  Pt appears volume depleted likely from poor appetite secondary to viral illness  Will hold ACE/ARB, Bumex and metolazone  Gentle IV hydration  Monitor closely for S/S of volume overload    6/18  Gentle Hydration  PICC placement  Cardiology    6/22  Not in fluid overload  Daily wts  Fluid restriction <1.5L   On BB, Iso+Hydra  Cardiology recs appreciated

## 2020-06-22 NOTE — PROGRESS NOTES
[facility] -   Nephrology  Progress Note    Patient Name: Yan Choudhury  MRN: [id number]  Admission Date: 6/17/2020  Hospital Length of Stay: 5 days  Attending Provider: Ney Conteh MD   Primary Care Physician: Sandra Estevez MD  Principal Problem:COVID-19 virus infection    Subjective:     HPI: Pt was seen and examined. H/o and chart reviewed. Pt is a 66 y/o male with a h/o of CKD stage 3, severe combined diastolic and systolic CHF (EF 15%), DM, HTN, and obesity who presented with weakness and SOB. Pt was admitted for COVID-19 infection. Renal was consulted because s Cr has worsened from baseline of about 2.5 to 4.1, repeat today 3.2. Labs, meds, mgmt (both inpt and outpt) reviewed. Pt was on bumex 2 mg po tid and metolazone 5 mg po qd. No GI losses reported, denies NSAIds, no abx,, no recent infections. No other pertinent issues. Currently, he denies SOB, has no leg swelling. All the diuretics are on hold.    Interval History:     6/22/20: Creatinine has stabilized at 3.         Review of patient's allergies indicates:  No Known Allergies  Current Facility-Administered Medications   Medication Frequency    acetaminophen tablet 650 mg Q4H PRN    albuterol inhaler 2 puff Q8H    aspirin EC tablet 81 mg QAM    carvediloL tablet 3.125 mg BID WM    cefTRIAXone (ROCEPHIN) 1 g/50 mL D5W IVPB Q24H    dextrose 50% injection 12.5 g PRN    dextrose 50% injection 25 g PRN    dorzolamide-timolol 2-0.5% ophthalmic solution 1 drop BID    glucagon (human recombinant) injection 1 mg PRN    glucose chewable tablet 16 g PRN    glucose chewable tablet 24 g PRN    hydrALAZINE tablet 25 mg Q8H    insulin aspart U-100 pen 1-10 Units QID (AC + HS) PRN    insulin detemir U-100 pen 10 Units QHS    isosorbide dinitrate tablet 10 mg TID    latanoprost 0.005 % ophthalmic solution 1 drop Daily    magnesium oxide tablet 400 mg Daily    multivitamin tablet Daily    ondansetron injection 4 mg Q8H PRN     pantoprazole EC tablet 40 mg Daily    sodium bicarbonate tablet 650 mg TID    sodium chloride 0.9% flush 10 mL PRN       Objective:     Vital Signs (Most Recent):  Temp: 96.3 °F (35.7 °C) (06/22/20 1200)  Pulse: 107 (06/22/20 1200)  Resp: 20 (06/22/20 1200)  BP: 110/74 (06/22/20 1200)  SpO2: 96 % (06/22/20 1200)  O2 Device (Oxygen Therapy): nasal cannula (06/22/20 0838) Vital Signs (24h Range):  Temp:  [96.3 °F (35.7 °C)-99.7 °F (37.6 °C)] 96.3 °F (35.7 °C)  Pulse:  [] 107  Resp:  [18-22] 20  SpO2:  [93 %-96 %] 96 %  BP: ()/(60-74) 110/74     Weight: 83.8 kg (184 lb 11.9 oz) (06/21/20 0331)  Body mass index is 28.09 kg/m².  Body surface area is 2.01 meters squared.    I/O last 3 completed shifts:  In: 766 [P.O.:716; IV Piggyback:50]  Out: 625 [Urine:625]    Physical Exam   Deferred because of Covid + state.     Significant Labs:  Lab Results   Component Value Date    CREATININE 3.0 (H) 06/22/2020    BUN 73 (H) 06/22/2020     06/22/2020    K 3.9 06/22/2020     06/22/2020    CO2 20 (L) 06/22/2020     Lab Results   Component Value Date    PTH 88.0 (H) 10/08/2019    CALCIUM 8.4 (L) 06/22/2020    PHOS 3.8 06/21/2020     Lab Results   Component Value Date    ALBUMIN 2.1 (L) 06/22/2020     Lab Results   Component Value Date    WBC 4.66 06/21/2020    HGB 12.6 (L) 06/21/2020    HCT 40.2 06/21/2020    MCV 77 (L) 06/21/2020     (L) 06/21/2020       Recent Labs   Lab 06/21/20  0620   MG 1.5*         Significant Imaging:  Imaging Results          X-Ray Chest AP Portable (Final result)  Result time 06/17/20 09:11:42    Final result by Rhys Blas III, MD (06/17/20 09:11:42)                 Impression:      Slight increase in the the vascular markings suggested, a little greater at the right base.  Mild congestion?  No overt failure.  Follow-up recommended      Electronically signed by: Rhys Blas MD  Date:    06/17/2020  Time:    09:11             Narrative:    EXAMINATION:  XR  CHEST AP PORTABLE    CLINICAL HISTORY:  CHF;    COMPARISON:  May 28th    FINDINGS:  The heart size remains enlarged with a permanent bipolar pacing device in position on the left.  The lungs show no consolidation or effusion.  Slight increase in the vascular markings suggested, a little greater at the right base.                                  Assessment/Plan:     Acute kidney injury superimposed on CKD  BROOKE on CKD stage 4.  No indications for acute or chronic HD at present.  Creatinine has now stabilized at 3.   Diuretics were held. Encourage po intake. Avoid NSAIDs, ACE-I, ARB.           Sepsis  Continue antibiotics.     Acidosis, metabolic  Continue po sodium bicarbonate.     Hypomagnesemia  Replete magnesium.         Thank you for your consult. I will follow-up with patient. Please contact us if you have any additional questions.    Aubrey Seth MD  Nephrology  [facility]

## 2020-06-23 LAB
ALBUMIN SERPL BCP-MCNC: 2 G/DL (ref 3.5–5.2)
ALP SERPL-CCNC: 83 U/L (ref 55–135)
ALT SERPL W/O P-5'-P-CCNC: 122 U/L (ref 10–44)
ANION GAP SERPL CALC-SCNC: 14 MMOL/L (ref 8–16)
ANISOCYTOSIS BLD QL SMEAR: SLIGHT
AST SERPL-CCNC: 245 U/L (ref 10–40)
BASOPHILS # BLD AUTO: 0.02 K/UL (ref 0–0.2)
BASOPHILS NFR BLD: 0.3 % (ref 0–1.9)
BILIRUB SERPL-MCNC: 1.1 MG/DL (ref 0.1–1)
BUN SERPL-MCNC: 80 MG/DL (ref 8–23)
CALCIUM SERPL-MCNC: 8.6 MG/DL (ref 8.7–10.5)
CHLORIDE SERPL-SCNC: 108 MMOL/L (ref 95–110)
CO2 SERPL-SCNC: 20 MMOL/L (ref 23–29)
CREAT SERPL-MCNC: 3 MG/DL (ref 0.5–1.4)
CRP SERPL-MCNC: 132.8 MG/L (ref 0–8.2)
DACRYOCYTES BLD QL SMEAR: ABNORMAL
DIFFERENTIAL METHOD: ABNORMAL
EOSINOPHIL # BLD AUTO: 0 K/UL (ref 0–0.5)
EOSINOPHIL NFR BLD: 0.2 % (ref 0–8)
ERYTHROCYTE [DISTWIDTH] IN BLOOD BY AUTOMATED COUNT: 20.1 % (ref 11.5–14.5)
EST. GFR  (AFRICAN AMERICAN): 24 ML/MIN/1.73 M^2
EST. GFR  (NON AFRICAN AMERICAN): 21 ML/MIN/1.73 M^2
FERRITIN SERPL-MCNC: 299 NG/ML (ref 20–300)
GLUCOSE SERPL-MCNC: 86 MG/DL (ref 70–110)
HCT VFR BLD AUTO: 37.4 % (ref 40–54)
HGB BLD-MCNC: 11.7 G/DL (ref 14–18)
HYPOCHROMIA BLD QL SMEAR: ABNORMAL
IMM GRANULOCYTES # BLD AUTO: 0.02 K/UL (ref 0–0.04)
IMM GRANULOCYTES NFR BLD AUTO: 0.3 % (ref 0–0.5)
LYMPHOCYTES # BLD AUTO: 1.1 K/UL (ref 1–4.8)
LYMPHOCYTES NFR BLD: 19 % (ref 18–48)
MAGNESIUM SERPL-MCNC: 1.6 MG/DL (ref 1.6–2.6)
MCH RBC QN AUTO: 24 PG (ref 27–31)
MCHC RBC AUTO-ENTMCNC: 31.3 G/DL (ref 32–36)
MCV RBC AUTO: 77 FL (ref 82–98)
MONOCYTES # BLD AUTO: 0.3 K/UL (ref 0.3–1)
MONOCYTES NFR BLD: 5.8 % (ref 4–15)
NEUTROPHILS # BLD AUTO: 4.4 K/UL (ref 1.8–7.7)
NEUTROPHILS NFR BLD: 74.4 % (ref 38–73)
NRBC BLD-RTO: 0 /100 WBC
OVALOCYTES BLD QL SMEAR: ABNORMAL
PHOSPHATE SERPL-MCNC: 4.1 MG/DL (ref 2.7–4.5)
PLATELET # BLD AUTO: 140 K/UL (ref 150–350)
PLATELET BLD QL SMEAR: ABNORMAL
PMV BLD AUTO: ABNORMAL FL (ref 9.2–12.9)
POCT GLUCOSE: 78 MG/DL (ref 70–110)
POCT GLUCOSE: 87 MG/DL (ref 70–110)
POCT GLUCOSE: 87 MG/DL (ref 70–110)
POCT GLUCOSE: 88 MG/DL (ref 70–110)
POCT GLUCOSE: 89 MG/DL (ref 70–110)
POIKILOCYTOSIS BLD QL SMEAR: SLIGHT
POLYCHROMASIA BLD QL SMEAR: ABNORMAL
POTASSIUM SERPL-SCNC: 4.1 MMOL/L (ref 3.5–5.1)
PROT SERPL-MCNC: 6.5 G/DL (ref 6–8.4)
RBC # BLD AUTO: 4.88 M/UL (ref 4.6–6.2)
SODIUM SERPL-SCNC: 142 MMOL/L (ref 136–145)
TARGETS BLD QL SMEAR: ABNORMAL
WBC # BLD AUTO: 5.9 K/UL (ref 3.9–12.7)

## 2020-06-23 PROCEDURE — 25000003 PHARM REV CODE 250: Performed by: INTERNAL MEDICINE

## 2020-06-23 PROCEDURE — 82728 ASSAY OF FERRITIN: CPT

## 2020-06-23 PROCEDURE — 94760 N-INVAS EAR/PLS OXIMETRY 1: CPT

## 2020-06-23 PROCEDURE — 94640 AIRWAY INHALATION TREATMENT: CPT

## 2020-06-23 PROCEDURE — 86140 C-REACTIVE PROTEIN: CPT

## 2020-06-23 PROCEDURE — 27000221 HC OXYGEN, UP TO 24 HOURS

## 2020-06-23 PROCEDURE — 21400001 HC TELEMETRY ROOM

## 2020-06-23 PROCEDURE — 99900035 HC TECH TIME PER 15 MIN (STAT)

## 2020-06-23 PROCEDURE — 25000003 PHARM REV CODE 250: Performed by: NURSE PRACTITIONER

## 2020-06-23 PROCEDURE — 63600175 PHARM REV CODE 636 W HCPCS: Performed by: HOSPITALIST

## 2020-06-23 PROCEDURE — 99233 PR SUBSEQUENT HOSPITAL CARE,LEVL III: ICD-10-PCS | Mod: CR,,, | Performed by: INTERNAL MEDICINE

## 2020-06-23 PROCEDURE — 80053 COMPREHEN METABOLIC PANEL: CPT

## 2020-06-23 PROCEDURE — 99233 SBSQ HOSP IP/OBS HIGH 50: CPT | Mod: CR,,, | Performed by: INTERNAL MEDICINE

## 2020-06-23 PROCEDURE — 84100 ASSAY OF PHOSPHORUS: CPT

## 2020-06-23 PROCEDURE — 85025 COMPLETE CBC W/AUTO DIFF WBC: CPT

## 2020-06-23 PROCEDURE — 83735 ASSAY OF MAGNESIUM: CPT

## 2020-06-23 RX ADMIN — CARVEDILOL 3.12 MG: 3.12 TABLET, FILM COATED ORAL at 08:06

## 2020-06-23 RX ADMIN — SODIUM BICARBONATE 650 MG: 650 TABLET ORAL at 02:06

## 2020-06-23 RX ADMIN — ALBUTEROL SULFATE 2 PUFF: 90 AEROSOL, METERED RESPIRATORY (INHALATION) at 08:06

## 2020-06-23 RX ADMIN — Medication 400 MG: at 08:06

## 2020-06-23 RX ADMIN — ASPIRIN 81 MG: 81 TABLET, COATED ORAL at 08:06

## 2020-06-23 RX ADMIN — DORZOLAMIDE HYDROCHLORIDE AND TIMOLOL MALEATE 1 DROP: 20; 5 SOLUTION/ DROPS OPHTHALMIC at 08:06

## 2020-06-23 RX ADMIN — CARVEDILOL 3.12 MG: 3.12 TABLET, FILM COATED ORAL at 05:06

## 2020-06-23 RX ADMIN — ALBUTEROL SULFATE 2 PUFF: 90 AEROSOL, METERED RESPIRATORY (INHALATION) at 01:06

## 2020-06-23 RX ADMIN — LATANOPROST 1 DROP: 50 SOLUTION OPHTHALMIC at 08:06

## 2020-06-23 RX ADMIN — CEFTRIAXONE 1 G: 1 INJECTION, SOLUTION INTRAVENOUS at 11:06

## 2020-06-23 RX ADMIN — PANTOPRAZOLE SODIUM 40 MG: 40 TABLET, DELAYED RELEASE ORAL at 08:06

## 2020-06-23 RX ADMIN — THERA TABS 1 TABLET: TAB at 11:06

## 2020-06-23 RX ADMIN — SODIUM BICARBONATE 650 MG: 650 TABLET ORAL at 08:06

## 2020-06-23 NOTE — PROGRESS NOTES
[facility] Hospital Medicine  Progress Note    Patient Name: Yan Choudhury  MRN: [id number]  Patient Class: IP- Inpatient   Admission Date: 6/17/2020  Length of Stay: 6 days  Attending Physician: Ney Conteh MD  Primary Care Provider: Sandra Estevez MD        Subjective:     Principal Problem:COVID-19 virus infection        HPI:  Pt is a 66 yo male with PMHx of combined CHF with EF 15 %, CKD III, DM II and ischemic cardiomyopathy who presents to the ED due to weakness. Wife provides history as patient is a difficult historian. Wife explains pt has had a decreased appetite with increasing weakness onset for approx 6 days. Wife says that Cardiology mentioned possible dehydration. Wife says that pt has not been eating or drinking regularly with increased weakness, walking slow and increased fatigue. While in the ED, nurse states that pt was tachypnic with respiratory rate 26.   On arrival: Temp 97, pulse 74, resp 22, B/P 99/56 and pulse ox 95 on RA.   CXR - Slight increase in the the vascular markings suggested, a little greater at the right base.  Mild congestion?  No overt failure.    BNP = 1874 (last admission 2300)  Labs find WBC 3.13, Na 131, serum creatinine 4.1 (baseline 2.7), glucose 119  PT IS COVID +  Pt has received a saline bolus 250 cc in the ED. Afterwards, blood pressure noted 82/62 and a second saline bolus of 250 cc given.       Overview/Hospital Course:  6/18  Patient states his breathing has improved. Renal function worsened from prior study - Nephrology has been consulted. Discussed with Cardiology. Patient will have PICC placed to measure Cardiac Output. Patient a candidate for Remdesivir pending ID review and recommendation. Patient reports SOB + Cough.   6/22- Patient seen at bedside, resting comfortably. Continues to improves does not report any acute complaints.   6/23- Patient seen this morning at bedside, resting comfortably. Reports sleeping well. Notes that he feels  genesis and has no specific complaints. T max 100.6 overnight.     Interval History: Had 2 loose BM s today .     Review of Systems   Constitutional: Negative for appetite change, chills, diaphoresis, fatigue, fever and unexpected weight change.   HENT: Negative for congestion, ear discharge, ear pain, hearing loss, nosebleeds, postnasal drip, sinus pressure, sinus pain, sneezing, sore throat, tinnitus, trouble swallowing and voice change.    Eyes: Negative for pain, discharge, redness and itching.   Respiratory: Negative for cough, chest tightness, shortness of breath and wheezing.    Cardiovascular: Negative for chest pain, palpitations and leg swelling.   Gastrointestinal: Negative for abdominal distention, abdominal pain, blood in stool, constipation, diarrhea, nausea, rectal pain and vomiting.   Endocrine: Negative for cold intolerance, heat intolerance, polydipsia, polyphagia and polyuria.   Genitourinary: Negative for decreased urine volume, difficulty urinating, discharge, dysuria, flank pain, frequency, hematuria, penile pain, scrotal swelling, testicular pain and urgency.   Musculoskeletal: Negative for arthralgias, back pain, joint swelling, myalgias and neck pain.   Skin: Negative for pallor and rash.   Neurological: Negative for dizziness, tremors, weakness, light-headedness, numbness and headaches.   Hematological: Negative for adenopathy. Does not bruise/bleed easily.   Psychiatric/Behavioral: Negative for agitation, confusion, sleep disturbance and suicidal ideas. The patient is not nervous/anxious.    All other systems reviewed and are negative.    Objective:     Vital Signs (Most Recent):  Temp: 98.3 °F (36.8 °C) (06/23/20 0837)  Pulse: 88 (06/23/20 0849)  Resp: 15 (06/23/20 0849)  BP: 113/70 (06/23/20 0837)  SpO2: 96 % (06/23/20 0849) Vital Signs (24h Range):  Temp:  [96.3 °F (35.7 °C)-100.6 °F (38.1 °C)] 98.3 °F (36.8 °C)  Pulse:  [] 88  Resp:  [12-22] 15  SpO2:  [93 %-98 %] 96 %  BP:  ()/(63-74) 113/70     Weight: 81.8 kg (180 lb 5.4 oz)  Body mass index is 27.42 kg/m².    Intake/Output Summary (Last 24 hours) at 6/23/2020 1029  Last data filed at 6/23/2020 0500  Gross per 24 hour   Intake 644 ml   Output --   Net 644 ml      Physical Exam     Appearance: Normal appearance.   HENT:      Head: Normocephalic and atraumatic.      Nose: Nose normal.   Eyes:      Extraocular Movements: Extraocular movements intact.      Conjunctiva/sclera: Conjunctivae normal.      Pupils: Pupils are equal, round, and reactive to light.   Neck:      Musculoskeletal: Normal range of motion and neck supple.   Cardiovascular:      Rate and Rhythm: Normal rate and regular rhythm.      Pulses: Normal pulses.      Heart sounds: Normal heart sounds.   Pulmonary:      Effort: Pulmonary effort is normal.      Breath sounds: Normal breath sounds.   Abdominal:      General: Abdomen is flat. Bowel sounds are normal.      Palpations: Abdomen is soft.   Musculoskeletal: Normal range of motion.   Skin:     General: Skin is warm and dry.   Neurological:      General: No focal deficit present.      Mental Status: He is alert and oriented to person, place, and time.  Significant Labs: All pertinent labs within the past 24 hours have been reviewed.    Significant Imaging: I have reviewed and interpreted all pertinent imaging results/findings within the past 24 hours.      Assessment/Plan:      * COVID-19 virus infection  - COVID-19 testing - positive result  - Infection Control notified     - Isolation:   - Airborne, Contact and Droplet Precautions  - Cohort patients into COVID units  - N95 masks must be fit tested, wear eye protection  - 20 second hand hygiene              - Limit visitors per hospital policy              - Consolidating lab draws, nursing care, provider visits, and interventions    - Diagnostics: (leukopenia, hyponatremia, hyperferritinemia, elevated troponin, elevated d-dimer, age, and comorbidities are  significant predictors of poor clinical outcome)  CBC, CMP, Ferritin, CRP, LDH, Blood Culture x2 and Portable CXR    - Management:  Supplemental O2 to maintain SpO2 >92%  Telemetry  Continuous/intermittent Pulse Ox  Albuterol treatment PRN  Ceft for infiltrate  Vitamin C, multi vitamin  Monitor labs every 48 hours  Advance Care Planning     Code Status - FULL CODE according to patient's wife             Acute kidney injury superimposed on CKD  Serum creatinine 4.1 (baseline 2.7)  Avoid nephrotoxic meds  Will hold Bumex and Metolazone for now as pt appears volume depleted  Gentle IV hydration  Hold ACE/ARB combination drug    6/18  Nephrology on consult  Hold Nephrotoxic meds    6/22  Likely reached baseline Cr 3  Renal Following, no indication for HD now   Avoid Nephrotoxins    6/23  Cr Stabilize at 3         Sepsis  Criteria:  RR rate > 20, WBC 3.13  Lactic acid normal  B/P appropriate on arrival but did decline to 82/62 as pt is volume depleted  Blood cultures drawn  Normal saline 500 cc bolus given  Doxycyline given    6/18  Responding to gentle hydration  Monitor  Follow Cultures  ID    6/22  On Ceft  Culture NGTD     6/23  Continue current care        Acidosis, metabolic  6/23  2/2 BROOKE   Monitor       Hypomagnesemia  6/23  Monitor and Replete           Stage 4 chronic kidney disease  Above baseline  Appears volume overload  Hold ACE/ARB  Gentle IV hydration    6/18  Nephrology  Monitor  Gentle Hydration  Hydralazine    6/22  Nephrology recs appreciated     6/23  Cr stabilize around 3   Monitor   Renal following     Chronic combined systolic and diastolic congestive heart failure  EF 15 % and grade II diastolic dysfunction  Compensated currently  Pt appears volume depleted likely from poor appetite secondary to viral illness  Will hold ACE/ARB, Bumex and metolazone  Gentle IV hydration  Monitor closely for S/S of volume overload    6/18  Gentle Hydration  PICC placement  Cardiology    6/22  Not in fluid  overload  Daily wts  Fluid restriction <1.5L   On BB, Iso+Hydra  Cardiology recs appreciated     6/23  Stable, not in fluid overload   Continue BB  Iso +H held due to persistently low BP  Continue wts and fluid restriction per CHF protocol  Cardiology following         Hypertension associated with diabetes  Borderline low blood pressure  Hold antihypertensives for now  Gentle IV hydration - spoke with Cardiology, Dr. Pantoja, who recommended gentle IV hydration    6/22  Monitor BP  Meds held this morning due to low BP     6/23  Monitor BP/FS  BP on lower side   On BB   Continue ISS      VTE Risk Mitigation (From admission, onward)         Ordered     IP VTE HIGH RISK PATIENT  Once      06/17/20 1147     Place sequential compression device  Until discontinued      06/17/20 1147                      Ney Conteh MD  Department of Hospital Medicine   [facility] -

## 2020-06-23 NOTE — ASSESSMENT & PLAN NOTE
Borderline low blood pressure  Hold antihypertensives for now  Gentle IV hydration - spoke with Cardiology, Dr. Pantoja, who recommended gentle IV hydration    6/22  Monitor BP  Meds held this morning due to low BP     6/23  Monitor BP/FS  BP on lower side   On BB   Continue ISS

## 2020-06-23 NOTE — PLAN OF CARE
Patient A&Ox4.  Room air. Continuous pulse ox  AV paced.  Cardiac diet. 1500mL fluid restriction.  Incont x2.  Skin intact  JULIANN picc line.  IV antibiotics given.  No complaints of pain.  All questions answered.  Will continue to monitor.    Joann Ramos RN

## 2020-06-23 NOTE — SUBJECTIVE & OBJECTIVE
Interval History:     6/22/20: Creatinine has stabilized at 3.     6/23/20: Creatinine has remained at 3.         Review of patient's allergies indicates:  No Known Allergies  Current Facility-Administered Medications   Medication Frequency    acetaminophen tablet 650 mg Q4H PRN    albuterol inhaler 2 puff Q8H    aspirin EC tablet 81 mg QAM    carvediloL tablet 3.125 mg BID WM    cefTRIAXone (ROCEPHIN) 1 g/50 mL D5W IVPB Q24H    dextrose 50% injection 12.5 g PRN    dextrose 50% injection 25 g PRN    dorzolamide-timolol 2-0.5% ophthalmic solution 1 drop BID    glucagon (human recombinant) injection 1 mg PRN    glucose chewable tablet 16 g PRN    glucose chewable tablet 24 g PRN    insulin aspart U-100 pen 1-10 Units QID (AC + HS) PRN    insulin detemir U-100 pen 10 Units QHS    latanoprost 0.005 % ophthalmic solution 1 drop Daily    magnesium oxide tablet 400 mg BID    multivitamin tablet Daily    ondansetron injection 4 mg Q8H PRN    pantoprazole EC tablet 40 mg Daily    sodium bicarbonate tablet 650 mg TID    sodium chloride 0.9% flush 10 mL PRN       Objective:     Vital Signs (Most Recent):  Temp: 98.9 °F (37.2 °C) (06/23/20 1103)  Pulse: 86 (06/23/20 1103)  Resp: 18 (06/23/20 1103)  BP: 105/64 (06/23/20 1103)  SpO2: 99 % (06/23/20 1103)  O2 Device (Oxygen Therapy): room air (06/23/20 1103) Vital Signs (24h Range):  Temp:  [96.8 °F (36 °C)-100.6 °F (38.1 °C)] 98.9 °F (37.2 °C)  Pulse:  [] 86  Resp:  [12-22] 18  SpO2:  [93 %-99 %] 99 %  BP: ()/(63-71) 105/64     Weight: 81.8 kg (180 lb 5.4 oz) (06/23/20 0500)  Body mass index is 27.42 kg/m².  Body surface area is 1.98 meters squared.    I/O last 3 completed shifts:  In: 884 [P.O.:834; IV Piggyback:50]  Out: -     Physical Exam     Deferred because of Covid + state.     Significant Labs:  Lab Results   Component Value Date    CREATININE 3.0 (H) 06/23/2020    BUN 80 (H) 06/23/2020     06/23/2020    K 4.1 06/23/2020      06/23/2020    CO2 20 (L) 06/23/2020     Lab Results   Component Value Date    PTH 88.0 (H) 10/08/2019    CALCIUM 8.6 (L) 06/23/2020    PHOS 4.1 06/23/2020     Lab Results   Component Value Date    ALBUMIN 2.0 (L) 06/23/2020     Lab Results   Component Value Date    WBC 5.90 06/23/2020    HGB 11.7 (L) 06/23/2020    HCT 37.4 (L) 06/23/2020    MCV 77 (L) 06/23/2020     (L) 06/23/2020       Recent Labs   Lab 06/23/20  0638   MG 1.6         Significant Imaging:  Imaging Results          X-Ray Chest AP Portable (Final result)  Result time 06/17/20 09:11:42    Final result by Rhys Blas III, MD (06/17/20 09:11:42)                 Impression:      Slight increase in the the vascular markings suggested, a little greater at the right base.  Mild congestion?  No overt failure.  Follow-up recommended      Electronically signed by: Rhys Blas MD  Date:    06/17/2020  Time:    09:11             Narrative:    EXAMINATION:  XR CHEST AP PORTABLE    CLINICAL HISTORY:  CHF;    COMPARISON:  May 28th    FINDINGS:  The heart size remains enlarged with a permanent bipolar pacing device in position on the left.  The lungs show no consolidation or effusion.  Slight increase in the vascular markings suggested, a little greater at the right base.

## 2020-06-23 NOTE — ASSESSMENT & PLAN NOTE
Criteria:  RR rate > 20, WBC 3.13  Lactic acid normal  B/P appropriate on arrival but did decline to 82/62 as pt is volume depleted  Blood cultures drawn  Normal saline 500 cc bolus given  Doxycyline given    6/18  Responding to gentle hydration  Monitor  Follow Cultures  ID    6/22  On Ceft  Culture NGTD     6/23  Continue current care

## 2020-06-23 NOTE — PROGRESS NOTES
[facility] -   Nephrology  Progress Note    Patient Name: Yan Choudhury  MRN: [id number]  Admission Date: 6/17/2020  Hospital Length of Stay: 6 days  Attending Provider: Ney Conteh MD   Primary Care Physician: Sandra Estevez MD  Principal Problem:COVID-19 virus infection    Subjective:     HPI: Pt was seen and examined. H/o and chart reviewed. Pt is a 64 y/o male with a h/o of CKD stage 3, severe combined diastolic and systolic CHF (EF 15%), DM, HTN, and obesity who presented with weakness and SOB. Pt was admitted for COVID-19 infection. Renal was consulted because s Cr has worsened from baseline of about 2.5 to 4.1, repeat today 3.2. Labs, meds, mgmt (both inpt and outpt) reviewed. Pt was on bumex 2 mg po tid and metolazone 5 mg po qd. No GI losses reported, denies NSAIds, no abx,, no recent infections. No other pertinent issues. Currently, he denies SOB, has no leg swelling. All the diuretics are on hold.    Interval History:     6/22/20: Creatinine has stabilized at 3.     6/23/20: Creatinine has remained at 3.         Review of patient's allergies indicates:  No Known Allergies  Current Facility-Administered Medications   Medication Frequency    acetaminophen tablet 650 mg Q4H PRN    albuterol inhaler 2 puff Q8H    aspirin EC tablet 81 mg QAM    carvediloL tablet 3.125 mg BID WM    cefTRIAXone (ROCEPHIN) 1 g/50 mL D5W IVPB Q24H    dextrose 50% injection 12.5 g PRN    dextrose 50% injection 25 g PRN    dorzolamide-timolol 2-0.5% ophthalmic solution 1 drop BID    glucagon (human recombinant) injection 1 mg PRN    glucose chewable tablet 16 g PRN    glucose chewable tablet 24 g PRN    insulin aspart U-100 pen 1-10 Units QID (AC + HS) PRN    insulin detemir U-100 pen 10 Units QHS    latanoprost 0.005 % ophthalmic solution 1 drop Daily    magnesium oxide tablet 400 mg BID    multivitamin tablet Daily    ondansetron injection 4 mg Q8H PRN    pantoprazole EC tablet 40 mg Daily     sodium bicarbonate tablet 650 mg TID    sodium chloride 0.9% flush 10 mL PRN       Objective:     Vital Signs (Most Recent):  Temp: 98.9 °F (37.2 °C) (06/23/20 1103)  Pulse: 86 (06/23/20 1103)  Resp: 18 (06/23/20 1103)  BP: 105/64 (06/23/20 1103)  SpO2: 99 % (06/23/20 1103)  O2 Device (Oxygen Therapy): room air (06/23/20 1103) Vital Signs (24h Range):  Temp:  [96.8 °F (36 °C)-100.6 °F (38.1 °C)] 98.9 °F (37.2 °C)  Pulse:  [] 86  Resp:  [12-22] 18  SpO2:  [93 %-99 %] 99 %  BP: ()/(63-71) 105/64     Weight: 81.8 kg (180 lb 5.4 oz) (06/23/20 0500)  Body mass index is 27.42 kg/m².  Body surface area is 1.98 meters squared.    I/O last 3 completed shifts:  In: 884 [P.O.:834; IV Piggyback:50]  Out: -     Physical Exam     Deferred because of Covid + state.     Significant Labs:  Lab Results   Component Value Date    CREATININE 3.0 (H) 06/23/2020    BUN 80 (H) 06/23/2020     06/23/2020    K 4.1 06/23/2020     06/23/2020    CO2 20 (L) 06/23/2020     Lab Results   Component Value Date    PTH 88.0 (H) 10/08/2019    CALCIUM 8.6 (L) 06/23/2020    PHOS 4.1 06/23/2020     Lab Results   Component Value Date    ALBUMIN 2.0 (L) 06/23/2020     Lab Results   Component Value Date    WBC 5.90 06/23/2020    HGB 11.7 (L) 06/23/2020    HCT 37.4 (L) 06/23/2020    MCV 77 (L) 06/23/2020     (L) 06/23/2020       Recent Labs   Lab 06/23/20  0638   MG 1.6         Significant Imaging:  Imaging Results          X-Ray Chest AP Portable (Final result)  Result time 06/17/20 09:11:42    Final result by Rhys Blas III, MD (06/17/20 09:11:42)                 Impression:      Slight increase in the the vascular markings suggested, a little greater at the right base.  Mild congestion?  No overt failure.  Follow-up recommended      Electronically signed by: Rhys Blas MD  Date:    06/17/2020  Time:    09:11             Narrative:    EXAMINATION:  XR CHEST AP PORTABLE    CLINICAL  HISTORY:  CHF;    COMPARISON:  May 28th    FINDINGS:  The heart size remains enlarged with a permanent bipolar pacing device in position on the left.  The lungs show no consolidation or effusion.  Slight increase in the vascular markings suggested, a little greater at the right base.                                  Assessment/Plan:     Acute kidney injury superimposed on CKD  BROOKE on CKD stage 4.  No indications for acute or chronic HD at present.  Creatinine has now stabilized at 3.   Diuretics were held. Encourage po intake. Avoid NSAIDs, ACE-I, ARB.           Sepsis  Continue antibiotics.     Acidosis, metabolic  Continue po sodium bicarbonate.     Hypomagnesemia  Continue magnesium supplements.         Thank you for your consult. I will follow-up with patient. Please contact us if you have any additional questions.    Aubrey Seth MD  Nephrology  [facility]

## 2020-06-23 NOTE — PLAN OF CARE
POC reviwed,verbalized understanding.Pt remained free from falls. Fall precautions in place. Pt is AV Paced on monitor. VSS. PIV intact. Call bell and personal belongings within reach. Hourly rounding complete. No c/o at this time. Reminded to call for assistance. Will continue to monitor.

## 2020-06-23 NOTE — SUBJECTIVE & OBJECTIVE
Interval History: Had 2 loose BM s today .     Review of Systems   Constitutional: Negative for appetite change, chills, diaphoresis, fatigue, fever and unexpected weight change.   HENT: Negative for congestion, ear discharge, ear pain, hearing loss, nosebleeds, postnasal drip, sinus pressure, sinus pain, sneezing, sore throat, tinnitus, trouble swallowing and voice change.    Eyes: Negative for pain, discharge, redness and itching.   Respiratory: Negative for cough, chest tightness, shortness of breath and wheezing.    Cardiovascular: Negative for chest pain, palpitations and leg swelling.   Gastrointestinal: Negative for abdominal distention, abdominal pain, blood in stool, constipation, diarrhea, nausea, rectal pain and vomiting.   Endocrine: Negative for cold intolerance, heat intolerance, polydipsia, polyphagia and polyuria.   Genitourinary: Negative for decreased urine volume, difficulty urinating, discharge, dysuria, flank pain, frequency, hematuria, penile pain, scrotal swelling, testicular pain and urgency.   Musculoskeletal: Negative for arthralgias, back pain, joint swelling, myalgias and neck pain.   Skin: Negative for pallor and rash.   Neurological: Negative for dizziness, tremors, weakness, light-headedness, numbness and headaches.   Hematological: Negative for adenopathy. Does not bruise/bleed easily.   Psychiatric/Behavioral: Negative for agitation, confusion, sleep disturbance and suicidal ideas. The patient is not nervous/anxious.    All other systems reviewed and are negative.    Objective:     Vital Signs (Most Recent):  Temp: 98.3 °F (36.8 °C) (06/23/20 0837)  Pulse: 88 (06/23/20 0849)  Resp: 15 (06/23/20 0849)  BP: 113/70 (06/23/20 0837)  SpO2: 96 % (06/23/20 0849) Vital Signs (24h Range):  Temp:  [96.3 °F (35.7 °C)-100.6 °F (38.1 °C)] 98.3 °F (36.8 °C)  Pulse:  [] 88  Resp:  [12-22] 15  SpO2:  [93 %-98 %] 96 %  BP: ()/(63-74) 113/70     Weight: 81.8 kg (180 lb 5.4 oz)  Body mass  index is 27.42 kg/m².    Intake/Output Summary (Last 24 hours) at 6/23/2020 1029  Last data filed at 6/23/2020 0500  Gross per 24 hour   Intake 644 ml   Output --   Net 644 ml      Physical Exam     Appearance: Normal appearance.   HENT:      Head: Normocephalic and atraumatic.      Nose: Nose normal.   Eyes:      Extraocular Movements: Extraocular movements intact.      Conjunctiva/sclera: Conjunctivae normal.      Pupils: Pupils are equal, round, and reactive to light.   Neck:      Musculoskeletal: Normal range of motion and neck supple.   Cardiovascular:      Rate and Rhythm: Normal rate and regular rhythm.      Pulses: Normal pulses.      Heart sounds: Normal heart sounds.   Pulmonary:      Effort: Pulmonary effort is normal.      Breath sounds: Normal breath sounds.   Abdominal:      General: Abdomen is flat. Bowel sounds are normal.      Palpations: Abdomen is soft.   Musculoskeletal: Normal range of motion.   Skin:     General: Skin is warm and dry.   Neurological:      General: No focal deficit present.      Mental Status: He is alert and oriented to person, place, and time.  Significant Labs: All pertinent labs within the past 24 hours have been reviewed.    Significant Imaging: I have reviewed and interpreted all pertinent imaging results/findings within the past 24 hours.

## 2020-06-23 NOTE — ASSESSMENT & PLAN NOTE
EF 15 % and grade II diastolic dysfunction  Compensated currently  Pt appears volume depleted likely from poor appetite secondary to viral illness  Will hold ACE/ARB, Bumex and metolazone  Gentle IV hydration  Monitor closely for S/S of volume overload    6/18  Gentle Hydration  PICC placement  Cardiology    6/22  Not in fluid overload  Daily wts  Fluid restriction <1.5L   On BB, Iso+Hydra  Cardiology recs appreciated     6/23  Stable, not in fluid overload   Continue BB  Iso +H held due to persistently low BP  Continue wts and fluid restriction per CHF protocol  Cardiology following

## 2020-06-23 NOTE — ASSESSMENT & PLAN NOTE
Serum creatinine 4.1 (baseline 2.7)  Avoid nephrotoxic meds  Will hold Bumex and Metolazone for now as pt appears volume depleted  Gentle IV hydration  Hold ACE/ARB combination drug    6/18  Nephrology on consult  Hold Nephrotoxic meds    6/22  Likely reached baseline Cr 3  Renal Following, no indication for HD now   Avoid Nephrotoxins    6/23  Cr Stabilize at 3

## 2020-06-23 NOTE — PLAN OF CARE
Discharge re-assessment complete.  Current discharge plan is home with spouse. CM will follow if has 02 needs at discharge.       06/23/20 1224   Discharge Reassessment   Assessment Type Discharge Planning Reassessment   Do you have any problems affording any of your prescribed medications? No   Discharge Plan A Home with family   DME Needed Upon Discharge    (tbd)   Patient choice form signed by patient/caregiver N/A   Anticipated Discharge Disposition Home   Can the patient/caregiver answer the patient profile reliably? Yes, cognitively intact

## 2020-06-23 NOTE — ASSESSMENT & PLAN NOTE
Above baseline  Appears volume overload  Hold ACE/ARB  Gentle IV hydration    6/18  Nephrology  Monitor  Gentle Hydration  Hydralazine    6/22  Nephrology recs appreciated     6/23  Cr stabilize around 3   Monitor   Renal following

## 2020-06-24 LAB
ALBUMIN SERPL BCP-MCNC: 1.8 G/DL (ref 3.5–5.2)
ALP SERPL-CCNC: 76 U/L (ref 55–135)
ALT SERPL W/O P-5'-P-CCNC: 95 U/L (ref 10–44)
ANION GAP SERPL CALC-SCNC: 11 MMOL/L (ref 8–16)
AST SERPL-CCNC: 178 U/L (ref 10–40)
BILIRUB SERPL-MCNC: 1.2 MG/DL (ref 0.1–1)
BUN SERPL-MCNC: 83 MG/DL (ref 8–23)
CALCIUM SERPL-MCNC: 7.5 MG/DL (ref 8.7–10.5)
CHLORIDE SERPL-SCNC: 104 MMOL/L (ref 95–110)
CO2 SERPL-SCNC: 19 MMOL/L (ref 23–29)
CREAT SERPL-MCNC: 2.9 MG/DL (ref 0.5–1.4)
EST. GFR  (AFRICAN AMERICAN): 25 ML/MIN/1.73 M^2
EST. GFR  (NON AFRICAN AMERICAN): 21 ML/MIN/1.73 M^2
GLUCOSE SERPL-MCNC: 437 MG/DL (ref 70–110)
POCT GLUCOSE: 113 MG/DL (ref 70–110)
POCT GLUCOSE: 118 MG/DL (ref 70–110)
POCT GLUCOSE: 118 MG/DL (ref 70–110)
POCT GLUCOSE: 447 MG/DL (ref 70–110)
POCT GLUCOSE: 478 MG/DL (ref 70–110)
POCT GLUCOSE: 79 MG/DL (ref 70–110)
POTASSIUM SERPL-SCNC: 3.6 MMOL/L (ref 3.5–5.1)
PROT SERPL-MCNC: 5.9 G/DL (ref 6–8.4)
SODIUM SERPL-SCNC: 134 MMOL/L (ref 136–145)

## 2020-06-24 PROCEDURE — C1751 CATH, INF, PER/CENT/MIDLINE: HCPCS

## 2020-06-24 PROCEDURE — 21400001 HC TELEMETRY ROOM

## 2020-06-24 PROCEDURE — 99233 SBSQ HOSP IP/OBS HIGH 50: CPT | Mod: CR,,, | Performed by: INTERNAL MEDICINE

## 2020-06-24 PROCEDURE — 25000003 PHARM REV CODE 250: Performed by: INTERNAL MEDICINE

## 2020-06-24 PROCEDURE — 36573 INSJ PICC RS&I 5 YR+: CPT

## 2020-06-24 PROCEDURE — 80053 COMPREHEN METABOLIC PANEL: CPT

## 2020-06-24 PROCEDURE — 25000003 PHARM REV CODE 250: Performed by: NURSE PRACTITIONER

## 2020-06-24 PROCEDURE — 63600175 PHARM REV CODE 636 W HCPCS: Performed by: HOSPITALIST

## 2020-06-24 PROCEDURE — 94760 N-INVAS EAR/PLS OXIMETRY 1: CPT

## 2020-06-24 PROCEDURE — S5010 5% DEXTROSE AND 0.45% SALINE: HCPCS | Performed by: PHYSICIAN ASSISTANT

## 2020-06-24 PROCEDURE — 94640 AIRWAY INHALATION TREATMENT: CPT

## 2020-06-24 PROCEDURE — 25000003 PHARM REV CODE 250: Performed by: PHYSICIAN ASSISTANT

## 2020-06-24 PROCEDURE — 99233 PR SUBSEQUENT HOSPITAL CARE,LEVL III: ICD-10-PCS | Mod: CR,,, | Performed by: INTERNAL MEDICINE

## 2020-06-24 RX ORDER — ALBUTEROL SULFATE 90 UG/1
2 AEROSOL, METERED RESPIRATORY (INHALATION) EVERY 6 HOURS PRN
Status: DISCONTINUED | OUTPATIENT
Start: 2020-06-24 | End: 2020-06-26 | Stop reason: HOSPADM

## 2020-06-24 RX ORDER — DEXTROSE MONOHYDRATE AND SODIUM CHLORIDE 5; .45 G/100ML; G/100ML
INJECTION, SOLUTION INTRAVENOUS CONTINUOUS
Status: DISCONTINUED | OUTPATIENT
Start: 2020-06-24 | End: 2020-06-24

## 2020-06-24 RX ORDER — ALBUTEROL SULFATE 90 UG/1
2 AEROSOL, METERED RESPIRATORY (INHALATION) EVERY 6 HOURS PRN
Status: DISCONTINUED | OUTPATIENT
Start: 2020-06-24 | End: 2020-06-24

## 2020-06-24 RX ADMIN — Medication 400 MG: at 09:06

## 2020-06-24 RX ADMIN — CARVEDILOL 3.12 MG: 3.12 TABLET, FILM COATED ORAL at 04:06

## 2020-06-24 RX ADMIN — Medication 400 MG: at 08:06

## 2020-06-24 RX ADMIN — LATANOPROST 1 DROP: 50 SOLUTION OPHTHALMIC at 08:06

## 2020-06-24 RX ADMIN — THERA TABS 1 TABLET: TAB at 11:06

## 2020-06-24 RX ADMIN — SODIUM BICARBONATE 650 MG: 650 TABLET ORAL at 03:06

## 2020-06-24 RX ADMIN — SODIUM BICARBONATE 650 MG: 650 TABLET ORAL at 08:06

## 2020-06-24 RX ADMIN — PANTOPRAZOLE SODIUM 40 MG: 40 TABLET, DELAYED RELEASE ORAL at 08:06

## 2020-06-24 RX ADMIN — ALBUTEROL SULFATE 2 PUFF: 90 AEROSOL, METERED RESPIRATORY (INHALATION) at 07:06

## 2020-06-24 RX ADMIN — CEFTRIAXONE 1 G: 1 INJECTION, SOLUTION INTRAVENOUS at 11:06

## 2020-06-24 RX ADMIN — ALBUTEROL SULFATE 2 PUFF: 90 AEROSOL, METERED RESPIRATORY (INHALATION) at 12:06

## 2020-06-24 RX ADMIN — SODIUM BICARBONATE 650 MG: 650 TABLET ORAL at 09:06

## 2020-06-24 RX ADMIN — DORZOLAMIDE HYDROCHLORIDE AND TIMOLOL MALEATE 1 DROP: 20; 5 SOLUTION/ DROPS OPHTHALMIC at 09:06

## 2020-06-24 RX ADMIN — DEXTROSE AND SODIUM CHLORIDE: 5; .45 INJECTION, SOLUTION INTRAVENOUS at 01:06

## 2020-06-24 RX ADMIN — CARVEDILOL 3.12 MG: 3.12 TABLET, FILM COATED ORAL at 08:06

## 2020-06-24 RX ADMIN — DORZOLAMIDE HYDROCHLORIDE AND TIMOLOL MALEATE 1 DROP: 20; 5 SOLUTION/ DROPS OPHTHALMIC at 08:06

## 2020-06-24 RX ADMIN — ASPIRIN 81 MG: 81 TABLET, COATED ORAL at 08:06

## 2020-06-24 NOTE — SUBJECTIVE & OBJECTIVE
Interval History: Had 2 loose BM s today .     Review of Systems   Constitutional: Negative for appetite change, chills, diaphoresis, fatigue, fever and unexpected weight change.   HENT: Negative for congestion, ear discharge, ear pain, hearing loss, nosebleeds, postnasal drip, sinus pressure, sinus pain, sneezing, sore throat, tinnitus, trouble swallowing and voice change.    Eyes: Negative for pain, discharge, redness and itching.   Respiratory: Negative for cough, chest tightness, shortness of breath and wheezing.    Cardiovascular: Negative for chest pain, palpitations and leg swelling.   Gastrointestinal: Negative for abdominal distention, abdominal pain, blood in stool, constipation, diarrhea, nausea, rectal pain and vomiting.   Endocrine: Negative for cold intolerance, heat intolerance, polydipsia, polyphagia and polyuria.   Genitourinary: Negative for decreased urine volume, difficulty urinating, discharge, dysuria, flank pain, frequency, hematuria, penile pain, scrotal swelling, testicular pain and urgency.   Musculoskeletal: Negative for arthralgias, back pain, joint swelling, myalgias and neck pain.   Skin: Negative for pallor and rash.   Neurological: Negative for dizziness, tremors, weakness, light-headedness, numbness and headaches.   Hematological: Negative for adenopathy. Does not bruise/bleed easily.   Psychiatric/Behavioral: Negative for agitation, confusion, sleep disturbance and suicidal ideas. The patient is not nervous/anxious.    All other systems reviewed and are negative.    Objective:     Vital Signs (Most Recent):  Temp: 98.4 °F (36.9 °C) (06/24/20 0819)  Pulse: 85 (06/24/20 1100)  Resp: 18 (06/24/20 0819)  BP: 118/71 (06/24/20 0819)  SpO2: (!) 93 % (06/24/20 0819) Vital Signs (24h Range):  Temp:  [98.4 °F (36.9 °C)-99 °F (37.2 °C)] 98.4 °F (36.9 °C)  Pulse:  [] 85  Resp:  [18-22] 18  SpO2:  [93 %-100 %] 93 %  BP: (112-125)/(59-71) 118/71     Weight: 84.5 kg (186 lb 4.6 oz)  Body  mass index is 28.33 kg/m².    Intake/Output Summary (Last 24 hours) at 6/24/2020 1200  Last data filed at 6/24/2020 0500  Gross per 24 hour   Intake 1328 ml   Output --   Net 1328 ml      Physical Exam     Appearance: Normal appearance.   HENT:      Head: Normocephalic and atraumatic.      Nose: Nose normal.   Eyes:      Extraocular Movements: Extraocular movements intact.      Conjunctiva/sclera: Conjunctivae normal.      Pupils: Pupils are equal, round, and reactive to light.   Neck:      Musculoskeletal: Normal range of motion and neck supple.   Cardiovascular:      Rate and Rhythm: Normal rate and regular rhythm.      Pulses: Normal pulses.      Heart sounds: Normal heart sounds.   Pulmonary:      Effort: Pulmonary effort is normal.      Breath sounds: Normal breath sounds.   Abdominal:      General: Abdomen is flat. Bowel sounds are normal.      Palpations: Abdomen is soft.   Musculoskeletal: Normal range of motion.   Skin:     General: Skin is warm and dry.   Neurological:      General: No focal deficit present.      Mental Status: He is alert and oriented to person, place, and time.  Significant Labs: All pertinent labs within the past 24 hours have been reviewed.    Significant Imaging: I have reviewed and interpreted all pertinent imaging results/findings within the past 24 hours.

## 2020-06-24 NOTE — ASSESSMENT & PLAN NOTE
Criteria:  RR rate > 20, WBC 3.13  Lactic acid normal  B/P appropriate on arrival but did decline to 82/62 as pt is volume depleted  Blood cultures drawn  Normal saline 500 cc bolus given  Doxycyline given    6/18  Responding to gentle hydration  Monitor  Follow Cultures  ID    6/22  On Ceft  Culture NGTD     6/23  Continue current care    6/24  Improving

## 2020-06-24 NOTE — ASSESSMENT & PLAN NOTE
BROOKE on CKD stage 4.  No indications for acute or chronic HD at present.  Creatinine has slightly decreased to 2.9. .   Diuretics were held. Encourage po intake. Avoid NSAIDs, ACE-I, ARB.

## 2020-06-24 NOTE — PROGRESS NOTES
[facility] Hospital Medicine  Progress Note    Patient Name: Yan Choudhury  MRN: [id number]  Patient Class: IP- Inpatient   Admission Date: 6/17/2020  Length of Stay: 7 days  Attending Physician: Ney Conteh MD  Primary Care Provider: Sandra Estevez MD        Subjective:     Principal Problem:COVID-19 virus infection        HPI:  Pt is a 64 yo male with PMHx of combined CHF with EF 15 %, CKD III, DM II and ischemic cardiomyopathy who presents to the ED due to weakness. Wife provides history as patient is a difficult historian. Wife explains pt has had a decreased appetite with increasing weakness onset for approx 6 days. Wife says that Cardiology mentioned possible dehydration. Wife says that pt has not been eating or drinking regularly with increased weakness, walking slow and increased fatigue. While in the ED, nurse states that pt was tachypnic with respiratory rate 26.   On arrival: Temp 97, pulse 74, resp 22, B/P 99/56 and pulse ox 95 on RA.   CXR - Slight increase in the the vascular markings suggested, a little greater at the right base.  Mild congestion?  No overt failure.    BNP = 1874 (last admission 2300)  Labs find WBC 3.13, Na 131, serum creatinine 4.1 (baseline 2.7), glucose 119  PT IS COVID +  Pt has received a saline bolus 250 cc in the ED. Afterwards, blood pressure noted 82/62 and a second saline bolus of 250 cc given.       Overview/Hospital Course:  6/18  Patient states his breathing has improved. Renal function worsened from prior study - Nephrology has been consulted. Discussed with Cardiology. Patient will have PICC placed to measure Cardiac Output. Patient a candidate for Remdesivir pending ID review and recommendation. Patient reports SOB + Cough.   6/22- Patient seen at bedside, resting comfortably. Continues to improves does not report any acute complaints.   6/23- Patient seen this morning at bedside, resting comfortably. Reports sleeping well. Notes that he feels  genesis and has no specific complaints. T max 100.6 overnight.   6/24 - Patient seen at bedside, resting comfortably. His PICC line was removed about 4cm, new PICC line placed this morning. Patient has no acute complaints.     Interval History: Had 2 loose BM s today .     Review of Systems   Constitutional: Negative for appetite change, chills, diaphoresis, fatigue, fever and unexpected weight change.   HENT: Negative for congestion, ear discharge, ear pain, hearing loss, nosebleeds, postnasal drip, sinus pressure, sinus pain, sneezing, sore throat, tinnitus, trouble swallowing and voice change.    Eyes: Negative for pain, discharge, redness and itching.   Respiratory: Negative for cough, chest tightness, shortness of breath and wheezing.    Cardiovascular: Negative for chest pain, palpitations and leg swelling.   Gastrointestinal: Negative for abdominal distention, abdominal pain, blood in stool, constipation, diarrhea, nausea, rectal pain and vomiting.   Endocrine: Negative for cold intolerance, heat intolerance, polydipsia, polyphagia and polyuria.   Genitourinary: Negative for decreased urine volume, difficulty urinating, discharge, dysuria, flank pain, frequency, hematuria, penile pain, scrotal swelling, testicular pain and urgency.   Musculoskeletal: Negative for arthralgias, back pain, joint swelling, myalgias and neck pain.   Skin: Negative for pallor and rash.   Neurological: Negative for dizziness, tremors, weakness, light-headedness, numbness and headaches.   Hematological: Negative for adenopathy. Does not bruise/bleed easily.   Psychiatric/Behavioral: Negative for agitation, confusion, sleep disturbance and suicidal ideas. The patient is not nervous/anxious.    All other systems reviewed and are negative.    Objective:     Vital Signs (Most Recent):  Temp: 98.4 °F (36.9 °C) (06/24/20 0819)  Pulse: 85 (06/24/20 1100)  Resp: 18 (06/24/20 0819)  BP: 118/71 (06/24/20 0819)  SpO2: (!) 93 % (06/24/20 0819)  Vital Signs (24h Range):  Temp:  [98.4 °F (36.9 °C)-99 °F (37.2 °C)] 98.4 °F (36.9 °C)  Pulse:  [] 85  Resp:  [18-22] 18  SpO2:  [93 %-100 %] 93 %  BP: (112-125)/(59-71) 118/71     Weight: 84.5 kg (186 lb 4.6 oz)  Body mass index is 28.33 kg/m².    Intake/Output Summary (Last 24 hours) at 6/24/2020 1200  Last data filed at 6/24/2020 0500  Gross per 24 hour   Intake 1328 ml   Output --   Net 1328 ml      Physical Exam     Appearance: Normal appearance.   HENT:      Head: Normocephalic and atraumatic.      Nose: Nose normal.   Eyes:      Extraocular Movements: Extraocular movements intact.      Conjunctiva/sclera: Conjunctivae normal.      Pupils: Pupils are equal, round, and reactive to light.   Neck:      Musculoskeletal: Normal range of motion and neck supple.   Cardiovascular:      Rate and Rhythm: Normal rate and regular rhythm.      Pulses: Normal pulses.      Heart sounds: Normal heart sounds.   Pulmonary:      Effort: Pulmonary effort is normal.      Breath sounds: Normal breath sounds.   Abdominal:      General: Abdomen is flat. Bowel sounds are normal.      Palpations: Abdomen is soft.   Musculoskeletal: Normal range of motion.   Skin:     General: Skin is warm and dry.   Neurological:      General: No focal deficit present.      Mental Status: He is alert and oriented to person, place, and time.  Significant Labs: All pertinent labs within the past 24 hours have been reviewed.    Significant Imaging: I have reviewed and interpreted all pertinent imaging results/findings within the past 24 hours.      Assessment/Plan:      * COVID-19 virus infection  - COVID-19 testing - positive result  - Infection Control notified     - Isolation:   - Airborne, Contact and Droplet Precautions  - Cohort patients into COVID units  - N95 masks must be fit tested, wear eye protection  - 20 second hand hygiene              - Limit visitors per hospital policy              - Consolidating lab draws, nursing care, provider  visits, and interventions    - Diagnostics: (leukopenia, hyponatremia, hyperferritinemia, elevated troponin, elevated d-dimer, age, and comorbidities are significant predictors of poor clinical outcome)  CBC, CMP, Ferritin, CRP, LDH, Blood Culture x2 and Portable CXR    - Management:  Supplemental O2 to maintain SpO2 >92%  Telemetry  Continuous/intermittent Pulse Ox  Albuterol treatment PRN  Ceft for infiltrate  Vitamin C, multi vitamin  Monitor labs every 48 hours  Advance Care Planning     Code Status - FULL CODE according to patient's wife             Acute kidney injury superimposed on CKD  Serum creatinine 4.1 (baseline 2.7)  Avoid nephrotoxic meds  Will hold Bumex and Metolazone for now as pt appears volume depleted  Gentle IV hydration  Hold ACE/ARB combination drug    6/18  Nephrology on consult  Hold Nephrotoxic meds    6/22  Likely reached baseline Cr 3  Renal Following, no indication for HD now   Avoid Nephrotoxins    6/23  Cr Stabilize at 3     6/24   Cr stable        Sepsis  Criteria:  RR rate > 20, WBC 3.13  Lactic acid normal  B/P appropriate on arrival but did decline to 82/62 as pt is volume depleted  Blood cultures drawn  Normal saline 500 cc bolus given  Doxycyline given    6/18  Responding to gentle hydration  Monitor  Follow Cultures  ID    6/22  On Ceft  Culture NGTD     6/23  Continue current care    6/24  Improving         Acidosis, metabolic  6/23  2/2 BROOKE   Monitor     6/24  Monitor       Hypomagnesemia  6/24  Monitor and Replete           Stage 4 chronic kidney disease  Above baseline  Appears volume overload  Hold ACE/ARB  Gentle IV hydration    6/18  Nephrology  Monitor  Gentle Hydration  Hydralazine    6/22  Nephrology recs appreciated     6/23  Cr stabilize around 3   Monitor   Renal following     6/24  Cr stable   Renal following     Chronic combined systolic and diastolic congestive heart failure  EF 15 % and grade II diastolic dysfunction  Compensated currently  Pt appears volume  depleted likely from poor appetite secondary to viral illness  Will hold ACE/ARB, Bumex and metolazone  Gentle IV hydration  Monitor closely for S/S of volume overload    6/18  Gentle Hydration  PICC placement  Cardiology    6/22  Not in fluid overload  Daily wts  Fluid restriction <1.5L   On BB, Iso+Hydra  Cardiology recs appreciated     6/23  Stable, not in fluid overload   Continue BB  Iso +H held due to persistently low BP  Continue wts and fluid restriction per CHF protocol  Cardiology following     6/24  Continue care as above         Hypertension associated with diabetes  Borderline low blood pressure  Hold antihypertensives for now  Gentle IV hydration - spoke with Cardiology, Dr. Pantoja, who recommended gentle IV hydration    6/22  Monitor BP  Meds held this morning due to low BP     6/23  Monitor BP/FS  BP on lower side   On BB   Continue ISS      6/24  Continue current care       VTE Risk Mitigation (From admission, onward)         Ordered     IP VTE HIGH RISK PATIENT  Once      06/17/20 1147     Place sequential compression device  Until discontinued      06/17/20 1147                      Ney Conteh MD  Department of Hospital Medicine   [facility]

## 2020-06-24 NOTE — ASSESSMENT & PLAN NOTE
EF 15 % and grade II diastolic dysfunction  Compensated currently  Pt appears volume depleted likely from poor appetite secondary to viral illness  Will hold ACE/ARB, Bumex and metolazone  Gentle IV hydration  Monitor closely for S/S of volume overload    6/18  Gentle Hydration  PICC placement  Cardiology    6/22  Not in fluid overload  Daily wts  Fluid restriction <1.5L   On BB, Iso+Hydra  Cardiology recs appreciated     6/23  Stable, not in fluid overload   Continue BB  Iso +H held due to persistently low BP  Continue wts and fluid restriction per CHF protocol  Cardiology following     6/24  Continue care as above

## 2020-06-24 NOTE — PLAN OF CARE
POC reviwed,verbalized understanding.Pt remained free from falls. Fall precautions in place. Pt is Paced on monitor. VSS. PICC intact. D5 0.45 NS started due to low BS. Call bell and personal belongings within reach. Hourly rounding complete. No c/o at this time. Reminded to call for assistance. Will continue to monitor.

## 2020-06-24 NOTE — NURSING
Jessica RN went into pt room to check telemetry monitor leads and informs me that pt's PICC dressing is coming off. Upon assessment PICC line dressing is not intact and is pulled out approximately 4 inches. Notified Dr. Conteh who states to order new PICC line [id number]. PICC team consulted. CORA

## 2020-06-24 NOTE — ASSESSMENT & PLAN NOTE
Serum creatinine 4.1 (baseline 2.7)  Avoid nephrotoxic meds  Will hold Bumex and Metolazone for now as pt appears volume depleted  Gentle IV hydration  Hold ACE/ARB combination drug    6/18  Nephrology on consult  Hold Nephrotoxic meds    6/22  Likely reached baseline Cr 3  Renal Following, no indication for HD now   Avoid Nephrotoxins    6/23  Cr Stabilize at 3     6/24   Cr stable

## 2020-06-24 NOTE — ASSESSMENT & PLAN NOTE
Above baseline  Appears volume overload  Hold ACE/ARB  Gentle IV hydration    6/18  Nephrology  Monitor  Gentle Hydration  Hydralazine    6/22  Nephrology recs appreciated     6/23  Cr stabilize around 3   Monitor   Renal following     6/24  Cr stable   Renal following

## 2020-06-24 NOTE — PROGRESS NOTES
[facility] -   Nephrology  Progress Note    Patient Name: Yan Choudhury  MRN: [id number]  Admission Date: 6/17/2020  Hospital Length of Stay: 7 days  Attending Provider: Ney Conteh MD   Primary Care Physician: Sandra Estevez MD  Principal Problem:COVID-19 virus infection    Subjective:     HPI: Pt was seen and examined. H/o and chart reviewed. Pt is a 64 y/o male with a h/o of CKD stage 3, severe combined diastolic and systolic CHF (EF 15%), DM, HTN, and obesity who presented with weakness and SOB. Pt was admitted for COVID-19 infection. Renal was consulted because s Cr has worsened from baseline of about 2.5 to 4.1, repeat today 3.2. Labs, meds, mgmt (both inpt and outpt) reviewed. Pt was on bumex 2 mg po tid and metolazone 5 mg po qd. No GI losses reported, denies NSAIds, no abx,, no recent infections. No other pertinent issues. Currently, he denies SOB, has no leg swelling. All the diuretics are on hold.    Interval History:     6/22/20: Creatinine has stabilized at 3.     6/23/20: Creatinine has remained at 3.     6/24/30: Creatinine has slightly improved to 2.9.             Review of patient's allergies indicates:  No Known Allergies  Current Facility-Administered Medications   Medication Frequency    acetaminophen tablet 650 mg Q4H PRN    albuterol inhaler 2 puff Q8H    aspirin EC tablet 81 mg QAM    carvediloL tablet 3.125 mg BID WM    dextrose 50% injection 12.5 g PRN    dextrose 50% injection 25 g PRN    dorzolamide-timolol 2-0.5% ophthalmic solution 1 drop BID    glucagon (human recombinant) injection 1 mg PRN    glucose chewable tablet 16 g PRN    glucose chewable tablet 24 g PRN    insulin aspart U-100 pen 1-10 Units QID (AC + HS) PRN    latanoprost 0.005 % ophthalmic solution 1 drop Daily    magnesium oxide tablet 400 mg BID    multivitamin tablet Daily    ondansetron injection 4 mg Q8H PRN    pantoprazole EC tablet 40 mg Daily    sodium bicarbonate tablet 650 mg TID     sodium chloride 0.9% flush 10 mL PRN       Objective:     Vital Signs (Most Recent):  Temp: 98.4 °F (36.9 °C) (06/24/20 1200)  Pulse: 92 (06/24/20 1200)  Resp: 18 (06/24/20 1200)  BP: 106/65 (06/24/20 1200)  SpO2: (!) 93 % (06/24/20 1200)  O2 Device (Oxygen Therapy): room air (06/24/20 0724) Vital Signs (24h Range):  Temp:  [98.4 °F (36.9 °C)-99 °F (37.2 °C)] 98.4 °F (36.9 °C)  Pulse:  [] 92  Resp:  [18-22] 18  SpO2:  [93 %-100 %] 93 %  BP: (106-125)/(59-71) 106/65     Weight: 84.5 kg (186 lb 4.6 oz) (06/24/20 0500)  Body mass index is 28.33 kg/m².  Body surface area is 2.01 meters squared.    I/O last 3 completed shifts:  In: 1564 [P.O.:1514; IV Piggyback:50]  Out: -     Physical Exam     Deferred because of Covid + state.     Significant Labs:  Lab Results   Component Value Date    CREATININE 2.9 (H) 06/24/2020    BUN 83 (H) 06/24/2020     (L) 06/24/2020    K 3.6 06/24/2020     06/24/2020    CO2 19 (L) 06/24/2020     Lab Results   Component Value Date    PTH 88.0 (H) 10/08/2019    CALCIUM 7.5 (L) 06/24/2020    PHOS 4.1 06/23/2020     Lab Results   Component Value Date    ALBUMIN 1.8 (L) 06/24/2020     Lab Results   Component Value Date    WBC 5.90 06/23/2020    HGB 11.7 (L) 06/23/2020    HCT 37.4 (L) 06/23/2020    MCV 77 (L) 06/23/2020     (L) 06/23/2020       Recent Labs   Lab 06/23/20  0638   MG 1.6         Significant Imaging:  Imaging Results          X-Ray Chest AP Portable (Final result)  Result time 06/17/20 09:11:42    Final result by Rhys Blas III, MD (06/17/20 09:11:42)                 Impression:      Slight increase in the the vascular markings suggested, a little greater at the right base.  Mild congestion?  No overt failure.  Follow-up recommended      Electronically signed by: Rhys Blas MD  Date:    06/17/2020  Time:    09:11             Narrative:    EXAMINATION:  XR CHEST AP PORTABLE    CLINICAL HISTORY:  CHF;    COMPARISON:  May  28th    FINDINGS:  The heart size remains enlarged with a permanent bipolar pacing device in position on the left.  The lungs show no consolidation or effusion.  Slight increase in the vascular markings suggested, a little greater at the right base.                                  Assessment/Plan:     Acute kidney injury superimposed on CKD  BROOKE on CKD stage 4.  No indications for acute or chronic HD at present.  Creatinine has slightly decreased to 2.9. .   Diuretics were held. Encourage po intake. Avoid NSAIDs, ACE-I, ARB.           Sepsis  Continue antibiotics.     Acidosis, metabolic  Continue po sodium bicarbonate.     Hypomagnesemia  Continue magnesium supplements.         Thank you for your consult. I will follow-up with patient. Please contact us if you have any additional questions.    Aubrey Seth MD  Nephrology  [facility]

## 2020-06-24 NOTE — ASSESSMENT & PLAN NOTE
Borderline low blood pressure  Hold antihypertensives for now  Gentle IV hydration - spoke with Cardiology, Dr. Pantoja, who recommended gentle IV hydration    6/22  Monitor BP  Meds held this morning due to low BP     6/23  Monitor BP/FS  BP on lower side   On BB   Continue ISS      6/24  Continue current care

## 2020-06-24 NOTE — NURSING
Nightly BS taken. Result 88. Held detimir. Attempted to give 4 glucose tablets per MD order if glucose is less than 100.  Unable to give glucose tablets due to pt unable chew tablets because of no teeth. Discussed with JORGE Willett.She verbally ordered to give P.O orange juice.

## 2020-06-24 NOTE — PROCEDURES
"Yan Choudhury is a 65 y.o. male patient.    Temp: 98.4 °F (36.9 °C) (06/24/20 0819)  Pulse: 101 (06/24/20 0819)  Resp: 18 (06/24/20 0819)  BP: 118/71 (06/24/20 0819)  SpO2: (!) 93 % (06/24/20 0819)  Weight: 84.5 kg (186 lb 4.6 oz) (06/24/20 0500)  Height: 5' 8" (172.7 cm) (06/17/20 0812)    PICC  Date/Time: 6/24/2020 9:15 AM  Performed by: Carl Phillips RN  Consent Done: Yes  Time out: Immediately prior to procedure a time out was called to verify the correct patient, procedure, equipment, support staff and site/side marked as required  Indications: med administration and vascular access  Anesthesia: local infiltration  Local anesthetic: lidocaine 1% without epinephrine  Anesthetic Total (mL): 2  Preparation: skin prepped with chlorhexidine (without alcohol)  Skin prep agent dried: skin prep agent completely dried prior to procedure  Sterile barriers: all five maximum sterile barriers used - cap, mask, sterile gown, sterile gloves, and large sterile sheet  Hand hygiene: hand hygiene performed prior to central venous catheter insertion  Location details: right brachial  Catheter type: double lumen  Catheter size: 5 Fr  Catheter Length: 32cm    Ultrasound guidance: yes  Vessel Caliber: medium and patent, compressibility normal  Needle advanced into vessel with real time Ultrasound guidance.  Guidewire confirmed in vessel.  Sterile sheath used.  Number of attempts: 1  Post-procedure: blood return through all ports, chlorhexidine patch and sterile dressing applied  Estimated blood loss (mL): 0  Specimens: No  Implants: No  Assessment: placement verified by x-ray, free fluid flow, successful placement and no pneumothorax on x-ray          Carl Phillips  6/24/2020  "

## 2020-06-24 NOTE — PLAN OF CARE
POC reviewed with pt. Pt verbalizes understanding of POC. No questions at this time.  AAOx2. NADN.  Paced rhythm on cardiac monitor, NSR-ST.  PICC to RUE replaced today due to first PICC being pulled out.  Pt remains free of falls.  No complaints at this time.  Safety measures in place. Will continue to monitor. Bed alarm on.  Informed pt to call for assistance before getting up. Pt verbalizes understanding.

## 2020-06-24 NOTE — SUBJECTIVE & OBJECTIVE
Interval History:     6/22/20: Creatinine has stabilized at 3.     6/23/20: Creatinine has remained at 3.     6/24/30: Creatinine has slightly improved to 2.9.             Review of patient's allergies indicates:  No Known Allergies  Current Facility-Administered Medications   Medication Frequency    acetaminophen tablet 650 mg Q4H PRN    albuterol inhaler 2 puff Q8H    aspirin EC tablet 81 mg QAM    carvediloL tablet 3.125 mg BID WM    dextrose 50% injection 12.5 g PRN    dextrose 50% injection 25 g PRN    dorzolamide-timolol 2-0.5% ophthalmic solution 1 drop BID    glucagon (human recombinant) injection 1 mg PRN    glucose chewable tablet 16 g PRN    glucose chewable tablet 24 g PRN    insulin aspart U-100 pen 1-10 Units QID (AC + HS) PRN    latanoprost 0.005 % ophthalmic solution 1 drop Daily    magnesium oxide tablet 400 mg BID    multivitamin tablet Daily    ondansetron injection 4 mg Q8H PRN    pantoprazole EC tablet 40 mg Daily    sodium bicarbonate tablet 650 mg TID    sodium chloride 0.9% flush 10 mL PRN       Objective:     Vital Signs (Most Recent):  Temp: 98.4 °F (36.9 °C) (06/24/20 1200)  Pulse: 92 (06/24/20 1200)  Resp: 18 (06/24/20 1200)  BP: 106/65 (06/24/20 1200)  SpO2: (!) 93 % (06/24/20 1200)  O2 Device (Oxygen Therapy): room air (06/24/20 0724) Vital Signs (24h Range):  Temp:  [98.4 °F (36.9 °C)-99 °F (37.2 °C)] 98.4 °F (36.9 °C)  Pulse:  [] 92  Resp:  [18-22] 18  SpO2:  [93 %-100 %] 93 %  BP: (106-125)/(59-71) 106/65     Weight: 84.5 kg (186 lb 4.6 oz) (06/24/20 0500)  Body mass index is 28.33 kg/m².  Body surface area is 2.01 meters squared.    I/O last 3 completed shifts:  In: 1564 [P.O.:1514; IV Piggyback:50]  Out: -     Physical Exam     Deferred because of Covid + state.     Significant Labs:  Lab Results   Component Value Date    CREATININE 2.9 (H) 06/24/2020    BUN 83 (H) 06/24/2020     (L) 06/24/2020    K 3.6 06/24/2020     06/24/2020    CO2 19 (L)  06/24/2020     Lab Results   Component Value Date    PTH 88.0 (H) 10/08/2019    CALCIUM 7.5 (L) 06/24/2020    PHOS 4.1 06/23/2020     Lab Results   Component Value Date    ALBUMIN 1.8 (L) 06/24/2020     Lab Results   Component Value Date    WBC 5.90 06/23/2020    HGB 11.7 (L) 06/23/2020    HCT 37.4 (L) 06/23/2020    MCV 77 (L) 06/23/2020     (L) 06/23/2020       Recent Labs   Lab 06/23/20  0638   MG 1.6         Significant Imaging:  Imaging Results          X-Ray Chest AP Portable (Final result)  Result time 06/17/20 09:11:42    Final result by Rhys Blas III, MD (06/17/20 09:11:42)                 Impression:      Slight increase in the the vascular markings suggested, a little greater at the right base.  Mild congestion?  No overt failure.  Follow-up recommended      Electronically signed by: Rhys Blas MD  Date:    06/17/2020  Time:    09:11             Narrative:    EXAMINATION:  XR CHEST AP PORTABLE    CLINICAL HISTORY:  CHF;    COMPARISON:  May 28th    FINDINGS:  The heart size remains enlarged with a permanent bipolar pacing device in position on the left.  The lungs show no consolidation or effusion.  Slight increase in the vascular markings suggested, a little greater at the right base.

## 2020-06-25 LAB
ALBUMIN SERPL BCP-MCNC: 1.9 G/DL (ref 3.5–5.2)
ALP SERPL-CCNC: 87 U/L (ref 55–135)
ALT SERPL W/O P-5'-P-CCNC: 87 U/L (ref 10–44)
ANION GAP SERPL CALC-SCNC: 12 MMOL/L (ref 8–16)
AST SERPL-CCNC: 132 U/L (ref 10–40)
BILIRUB SERPL-MCNC: 1.5 MG/DL (ref 0.1–1)
BUN SERPL-MCNC: 89 MG/DL (ref 8–23)
CALCIUM SERPL-MCNC: 8.5 MG/DL (ref 8.7–10.5)
CHLORIDE SERPL-SCNC: 105 MMOL/L (ref 95–110)
CO2 SERPL-SCNC: 22 MMOL/L (ref 23–29)
CREAT SERPL-MCNC: 2.9 MG/DL (ref 0.5–1.4)
EST. GFR  (AFRICAN AMERICAN): 25 ML/MIN/1.73 M^2
EST. GFR  (NON AFRICAN AMERICAN): 21 ML/MIN/1.73 M^2
FERRITIN SERPL-MCNC: 410 NG/ML (ref 20–300)
GLUCOSE SERPL-MCNC: 137 MG/DL (ref 70–110)
MAGNESIUM SERPL-MCNC: 1.8 MG/DL (ref 1.6–2.6)
POCT GLUCOSE: 124 MG/DL (ref 70–110)
POCT GLUCOSE: 130 MG/DL (ref 70–110)
POCT GLUCOSE: 130 MG/DL (ref 70–110)
POCT GLUCOSE: 135 MG/DL (ref 70–110)
POTASSIUM SERPL-SCNC: 3.7 MMOL/L (ref 3.5–5.1)
PROT SERPL-MCNC: 6.6 G/DL (ref 6–8.4)
SODIUM SERPL-SCNC: 139 MMOL/L (ref 136–145)

## 2020-06-25 PROCEDURE — 99233 PR SUBSEQUENT HOSPITAL CARE,LEVL III: ICD-10-PCS | Mod: CR,,, | Performed by: INTERNAL MEDICINE

## 2020-06-25 PROCEDURE — 97162 PT EVAL MOD COMPLEX 30 MIN: CPT

## 2020-06-25 PROCEDURE — 94761 N-INVAS EAR/PLS OXIMETRY MLT: CPT

## 2020-06-25 PROCEDURE — 82728 ASSAY OF FERRITIN: CPT

## 2020-06-25 PROCEDURE — 97530 THERAPEUTIC ACTIVITIES: CPT

## 2020-06-25 PROCEDURE — 99233 SBSQ HOSP IP/OBS HIGH 50: CPT | Mod: CR,,, | Performed by: INTERNAL MEDICINE

## 2020-06-25 PROCEDURE — 25000003 PHARM REV CODE 250: Performed by: NURSE PRACTITIONER

## 2020-06-25 PROCEDURE — 21400001 HC TELEMETRY ROOM

## 2020-06-25 PROCEDURE — 25000003 PHARM REV CODE 250: Performed by: INTERNAL MEDICINE

## 2020-06-25 PROCEDURE — 80053 COMPREHEN METABOLIC PANEL: CPT

## 2020-06-25 PROCEDURE — 83735 ASSAY OF MAGNESIUM: CPT

## 2020-06-25 RX ADMIN — SODIUM BICARBONATE 650 MG: 650 TABLET ORAL at 04:06

## 2020-06-25 RX ADMIN — DORZOLAMIDE HYDROCHLORIDE AND TIMOLOL MALEATE 1 DROP: 20; 5 SOLUTION/ DROPS OPHTHALMIC at 10:06

## 2020-06-25 RX ADMIN — THERA TABS 1 TABLET: TAB at 08:06

## 2020-06-25 RX ADMIN — Medication 400 MG: at 10:06

## 2020-06-25 RX ADMIN — LATANOPROST 1 DROP: 50 SOLUTION OPHTHALMIC at 09:06

## 2020-06-25 RX ADMIN — CARVEDILOL 3.12 MG: 3.12 TABLET, FILM COATED ORAL at 08:06

## 2020-06-25 RX ADMIN — ASPIRIN 81 MG: 81 TABLET, COATED ORAL at 06:06

## 2020-06-25 RX ADMIN — CARVEDILOL 3.12 MG: 3.12 TABLET, FILM COATED ORAL at 04:06

## 2020-06-25 RX ADMIN — SODIUM BICARBONATE 650 MG: 650 TABLET ORAL at 10:06

## 2020-06-25 RX ADMIN — SODIUM BICARBONATE 650 MG: 650 TABLET ORAL at 08:06

## 2020-06-25 RX ADMIN — PANTOPRAZOLE SODIUM 40 MG: 40 TABLET, DELAYED RELEASE ORAL at 08:06

## 2020-06-25 RX ADMIN — Medication 400 MG: at 08:06

## 2020-06-25 RX ADMIN — DORZOLAMIDE HYDROCHLORIDE AND TIMOLOL MALEATE 1 DROP: 20; 5 SOLUTION/ DROPS OPHTHALMIC at 09:06

## 2020-06-25 NOTE — SUBJECTIVE & OBJECTIVE
Review of Systems   Constitutional: Negative for appetite change, chills, diaphoresis, fatigue, fever and unexpected weight change.   HENT: Negative for congestion, ear discharge, ear pain, hearing loss, nosebleeds, postnasal drip, sinus pressure, sinus pain, sneezing, sore throat, tinnitus, trouble swallowing and voice change.    Eyes: Negative for pain, discharge, redness and itching.   Respiratory: Negative for cough, chest tightness, shortness of breath and wheezing.    Cardiovascular: Negative for chest pain, palpitations and leg swelling.   Gastrointestinal: Negative for abdominal distention, abdominal pain, blood in stool, constipation, diarrhea, nausea, rectal pain and vomiting.   Endocrine: Negative for cold intolerance, heat intolerance, polydipsia, polyphagia and polyuria.   Genitourinary: Negative for decreased urine volume, difficulty urinating, discharge, dysuria, flank pain, frequency, hematuria, penile pain, scrotal swelling, testicular pain and urgency.   Musculoskeletal: Negative for arthralgias, back pain, joint swelling, myalgias and neck pain.   Skin: Negative for pallor and rash.   Neurological: Negative for dizziness, tremors, weakness, light-headedness, numbness and headaches.   Hematological: Negative for adenopathy. Does not bruise/bleed easily.   Psychiatric/Behavioral: Negative for agitation, confusion, sleep disturbance and suicidal ideas. The patient is not nervous/anxious.    All other systems reviewed and are negative.    Objective:     Vital Signs (Most Recent):  Temp: 98.3 °F (36.8 °C) (06/25/20 0826)  Pulse: 96 (06/25/20 0826)  Resp: 20 (06/25/20 0826)  BP: 112/65 (06/25/20 0826)  SpO2: (!) 89 % (06/25/20 0826) Vital Signs (24h Range):  Temp:  [97.7 °F (36.5 °C)-99.1 °F (37.3 °C)] 98.3 °F (36.8 °C)  Pulse:  [] 96  Resp:  [18-20] 20  SpO2:  [87 %-95 %] 89 %  BP: (103-112)/(61-71) 112/65     Weight: 84.5 kg (186 lb 4.6 oz)  Body mass index is 28.33 kg/m².    Intake/Output  Summary (Last 24 hours) at 6/25/2020 1153  Last data filed at 6/25/2020 0400  Gross per 24 hour   Intake 728 ml   Output 500 ml   Net 228 ml      Physical Exam     Appearance: Normal appearance.   HENT:      Head: Normocephalic and atraumatic.      Nose: Nose normal.   Eyes:      Extraocular Movements: Extraocular movements intact.      Conjunctiva/sclera: Conjunctivae normal.      Pupils: Pupils are equal, round, and reactive to light.   Neck:      Musculoskeletal: Normal range of motion and neck supple.   Cardiovascular:      Rate and Rhythm: Normal rate and regular rhythm.      Pulses: Normal pulses.      Heart sounds: Normal heart sounds.   Pulmonary:      Effort: Pulmonary effort is normal.      Breath sounds: Normal breath sounds.   Abdominal:      General: Abdomen is flat. Bowel sounds are normal.      Palpations: Abdomen is soft.   Musculoskeletal: Normal range of motion.   Skin:     General: Skin is warm and dry.   Neurological:      General: No focal deficit present.      Mental Status: He is alert and oriented to person, place, and time.  Significant Labs: All pertinent labs within the past 24 hours have been reviewed.    Significant Imaging: I have reviewed and interpreted all pertinent imaging results/findings within the past 24 hours.

## 2020-06-25 NOTE — PLAN OF CARE
Patient resting with no acute distress. Patient still having mild confusion. No acute changes during this shift. All needs met, safety measures in place. Will continue to monitor.

## 2020-06-25 NOTE — SUBJECTIVE & OBJECTIVE
Interval History:     6/22/20: Creatinine has stabilized at 3.     6/23/20: Creatinine has remained at 3.     6/24/30: Creatinine has slightly improved to 2.9.     6/25/20: Creatinine has stabilized at 2.9.             Review of patient's allergies indicates:  No Known Allergies  Current Facility-Administered Medications   Medication Frequency    acetaminophen tablet 650 mg Q4H PRN    albuterol inhaler 2 puff Q6H PRN    aspirin EC tablet 81 mg QAM    carvediloL tablet 3.125 mg BID WM    dextrose 50% injection 12.5 g PRN    dextrose 50% injection 25 g PRN    dorzolamide-timolol 2-0.5% ophthalmic solution 1 drop BID    glucagon (human recombinant) injection 1 mg PRN    glucose chewable tablet 16 g PRN    glucose chewable tablet 24 g PRN    insulin aspart U-100 pen 1-10 Units QID (AC + HS) PRN    latanoprost 0.005 % ophthalmic solution 1 drop Daily    magnesium oxide tablet 400 mg BID    multivitamin tablet Daily    ondansetron injection 4 mg Q8H PRN    pantoprazole EC tablet 40 mg Daily    sodium bicarbonate tablet 650 mg TID    sodium chloride 0.9% flush 10 mL PRN       Objective:     Vital Signs (Most Recent):  Temp: 98.6 °F (37 °C) (06/25/20 1248)  Pulse: 84 (06/25/20 1248)  Resp: 18 (06/25/20 1248)  BP: 106/65 (06/25/20 1248)  SpO2: (!) 94 % (06/25/20 1248)  O2 Device (Oxygen Therapy): room air (06/25/20 1248) Vital Signs (24h Range):  Temp:  [97.7 °F (36.5 °C)-99.1 °F (37.3 °C)] 98.6 °F (37 °C)  Pulse:  [] 84  Resp:  [18-20] 18  SpO2:  [87 %-95 %] 94 %  BP: (103-112)/(61-71) 106/65     Weight: 84.5 kg (186 lb 4.6 oz) (06/24/20 0500)  Body mass index is 28.33 kg/m².  Body surface area is 2.01 meters squared.    I/O last 3 completed shifts:  In: 1226 [P.O.:1226]  Out: 500 [Urine:500]    Physical Exam     Deferred because of Covid + state.     Significant Labs:  Lab Results   Component Value Date    CREATININE 2.9 (H) 06/25/2020    BUN 89 (H) 06/25/2020     06/25/2020    K 3.7  06/25/2020     06/25/2020    CO2 22 (L) 06/25/2020     Lab Results   Component Value Date    PTH 88.0 (H) 10/08/2019    CALCIUM 8.5 (L) 06/25/2020    PHOS 4.1 06/23/2020     Lab Results   Component Value Date    ALBUMIN 1.9 (L) 06/25/2020     Lab Results   Component Value Date    WBC 5.90 06/23/2020    HGB 11.7 (L) 06/23/2020    HCT 37.4 (L) 06/23/2020    MCV 77 (L) 06/23/2020     (L) 06/23/2020       Recent Labs   Lab 06/25/20  0511   MG 1.8         Significant Imaging:  Imaging Results          X-Ray Chest AP Portable (Final result)  Result time 06/17/20 09:11:42    Final result by Rhys Blas III, MD (06/17/20 09:11:42)                 Impression:      Slight increase in the the vascular markings suggested, a little greater at the right base.  Mild congestion?  No overt failure.  Follow-up recommended      Electronically signed by: Rhys Blsa MD  Date:    06/17/2020  Time:    09:11             Narrative:    EXAMINATION:  XR CHEST AP PORTABLE    CLINICAL HISTORY:  CHF;    COMPARISON:  May 28th    FINDINGS:  The heart size remains enlarged with a permanent bipolar pacing device in position on the left.  The lungs show no consolidation or effusion.  Slight increase in the vascular markings suggested, a little greater at the right base.

## 2020-06-25 NOTE — ASSESSMENT & PLAN NOTE
EF 15 % and grade II diastolic dysfunction  Compensated currently  Pt appears volume depleted likely from poor appetite secondary to viral illness  Will hold ACE/ARB, Bumex and metolazone  Gentle IV hydration  Monitor closely for S/S of volume overload    6/18  Gentle Hydration  PICC placement  Cardiology    6/22  Not in fluid overload  Daily wts  Fluid restriction <1.5L   On BB, Iso+Hydra  Cardiology recs appreciated     6/23  Stable, not in fluid overload   Continue BB  Iso +H held due to persistently low BP  Continue wts and fluid restriction per CHF protocol  Cardiology following     6/24  Continue care as above     6/25  Not in exacerbation   Continue BB  Will follow up with Cardiology outpatient

## 2020-06-25 NOTE — ASSESSMENT & PLAN NOTE
Above baseline  Appears volume overload  Hold ACE/ARB  Gentle IV hydration    6/18  Nephrology  Monitor  Gentle Hydration  Hydralazine    6/22  Nephrology recs appreciated     6/23  Cr stabilize around 3   Monitor   Renal following     6/24  Cr stable   Renal following     6/25  CR improving   Renal following

## 2020-06-25 NOTE — ASSESSMENT & PLAN NOTE
Criteria:  RR rate > 20, WBC 3.13  Lactic acid normal  B/P appropriate on arrival but did decline to 82/62 as pt is volume depleted  Blood cultures drawn  Normal saline 500 cc bolus given  Doxycyline given    6/18  Responding to gentle hydration  Monitor  Follow Cultures  ID    6/22  On Ceft  Culture NGTD     6/23  Continue current care    6/24  Improving     6/25  Continue to show improvement

## 2020-06-25 NOTE — ASSESSMENT & PLAN NOTE
Serum creatinine 4.1 (baseline 2.7)  Avoid nephrotoxic meds  Will hold Bumex and Metolazone for now as pt appears volume depleted  Gentle IV hydration  Hold ACE/ARB combination drug    6/18  Nephrology on consult  Hold Nephrotoxic meds    6/22  Likely reached baseline Cr 3  Renal Following, no indication for HD now   Avoid Nephrotoxins    6/23  Cr Stabilize at 3     6/24   Cr stable    6/25  Resolved

## 2020-06-25 NOTE — PROGRESS NOTES
[facility] Hospital Medicine  Progress Note    Patient Name: Yan Choudhury  MRN: [id number]  Patient Class: IP- Inpatient   Admission Date: 6/17/2020  Length of Stay: 8 days  Attending Physician: Ney Conteh MD  Primary Care Provider: Sandra Estevez MD        Subjective:     Principal Problem:COVID-19 virus infection        HPI:  Pt is a 64 yo male with PMHx of combined CHF with EF 15 %, CKD III, DM II and ischemic cardiomyopathy who presents to the ED due to weakness. Wife provides history as patient is a difficult historian. Wife explains pt has had a decreased appetite with increasing weakness onset for approx 6 days. Wife says that Cardiology mentioned possible dehydration. Wife says that pt has not been eating or drinking regularly with increased weakness, walking slow and increased fatigue. While in the ED, nurse states that pt was tachypnic with respiratory rate 26.   On arrival: Temp 97, pulse 74, resp 22, B/P 99/56 and pulse ox 95 on RA.   CXR - Slight increase in the the vascular markings suggested, a little greater at the right base.  Mild congestion?  No overt failure.    BNP = 1874 (last admission 2300)  Labs find WBC 3.13, Na 131, serum creatinine 4.1 (baseline 2.7), glucose 119  PT IS COVID +  Pt has received a saline bolus 250 cc in the ED. Afterwards, blood pressure noted 82/62 and a second saline bolus of 250 cc given.       Overview/Hospital Course:  6/18  Patient states his breathing has improved. Renal function worsened from prior study - Nephrology has been consulted. Discussed with Cardiology. Patient will have PICC placed to measure Cardiac Output. Patient a candidate for Remdesivir pending ID review and recommendation. Patient reports SOB + Cough.   6/22- Patient seen at bedside, resting comfortably. Continues to improves does not report any acute complaints.   6/23- Patient seen this morning at bedside, resting comfortably. Reports sleeping well. Notes that he feels  genesis and has no specific complaints. T max 100.6 overnight.   6/24 - Patient seen at bedside, resting comfortably. His PICC line was removed about 4cm, new PICC line placed this morning. Patient has no acute complaints.   6/25- Patient seen this morning at bedside, resting comfortably. Had new PICC placed yesterday. Discussed working with PT today, His labs continue to improve. He has no acute complaints at this moment.         Review of Systems   Constitutional: Negative for appetite change, chills, diaphoresis, fatigue, fever and unexpected weight change.   HENT: Negative for congestion, ear discharge, ear pain, hearing loss, nosebleeds, postnasal drip, sinus pressure, sinus pain, sneezing, sore throat, tinnitus, trouble swallowing and voice change.    Eyes: Negative for pain, discharge, redness and itching.   Respiratory: Negative for cough, chest tightness, shortness of breath and wheezing.    Cardiovascular: Negative for chest pain, palpitations and leg swelling.   Gastrointestinal: Negative for abdominal distention, abdominal pain, blood in stool, constipation, diarrhea, nausea, rectal pain and vomiting.   Endocrine: Negative for cold intolerance, heat intolerance, polydipsia, polyphagia and polyuria.   Genitourinary: Negative for decreased urine volume, difficulty urinating, discharge, dysuria, flank pain, frequency, hematuria, penile pain, scrotal swelling, testicular pain and urgency.   Musculoskeletal: Negative for arthralgias, back pain, joint swelling, myalgias and neck pain.   Skin: Negative for pallor and rash.   Neurological: Negative for dizziness, tremors, weakness, light-headedness, numbness and headaches.   Hematological: Negative for adenopathy. Does not bruise/bleed easily.   Psychiatric/Behavioral: Negative for agitation, confusion, sleep disturbance and suicidal ideas. The patient is not nervous/anxious.    All other systems reviewed and are negative.    Objective:     Vital Signs (Most  Recent):  Temp: 98.3 °F (36.8 °C) (06/25/20 0826)  Pulse: 96 (06/25/20 0826)  Resp: 20 (06/25/20 0826)  BP: 112/65 (06/25/20 0826)  SpO2: (!) 89 % (06/25/20 0826) Vital Signs (24h Range):  Temp:  [97.7 °F (36.5 °C)-99.1 °F (37.3 °C)] 98.3 °F (36.8 °C)  Pulse:  [] 96  Resp:  [18-20] 20  SpO2:  [87 %-95 %] 89 %  BP: (103-112)/(61-71) 112/65     Weight: 84.5 kg (186 lb 4.6 oz)  Body mass index is 28.33 kg/m².    Intake/Output Summary (Last 24 hours) at 6/25/2020 1153  Last data filed at 6/25/2020 0400  Gross per 24 hour   Intake 728 ml   Output 500 ml   Net 228 ml      Physical Exam     Appearance: Normal appearance.   HENT:      Head: Normocephalic and atraumatic.      Nose: Nose normal.   Eyes:      Extraocular Movements: Extraocular movements intact.      Conjunctiva/sclera: Conjunctivae normal.      Pupils: Pupils are equal, round, and reactive to light.   Neck:      Musculoskeletal: Normal range of motion and neck supple.   Cardiovascular:      Rate and Rhythm: Normal rate and regular rhythm.      Pulses: Normal pulses.      Heart sounds: Normal heart sounds.   Pulmonary:      Effort: Pulmonary effort is normal.      Breath sounds: Normal breath sounds.   Abdominal:      General: Abdomen is flat. Bowel sounds are normal.      Palpations: Abdomen is soft.   Musculoskeletal: Normal range of motion.   Skin:     General: Skin is warm and dry.   Neurological:      General: No focal deficit present.      Mental Status: He is alert and oriented to person, place, and time.  Significant Labs: All pertinent labs within the past 24 hours have been reviewed.    Significant Imaging: I have reviewed and interpreted all pertinent imaging results/findings within the past 24 hours.      Assessment/Plan:      * COVID-19 virus infection  - COVID-19 testing - positive result  - Infection Control notified     - Isolation:   - Airborne, Contact and Droplet Precautions  - Cohort patients into COVID units  - N95 masks must be fit  tested, wear eye protection  - 20 second hand hygiene              - Limit visitors per hospital policy              - Consolidating lab draws, nursing care, provider visits, and interventions    - Diagnostics: (leukopenia, hyponatremia, hyperferritinemia, elevated troponin, elevated d-dimer, age, and comorbidities are significant predictors of poor clinical outcome)  CBC, CMP, Ferritin, CRP, LDH, Blood Culture x2 and Portable CXR    - Management:  Supplemental O2 to maintain SpO2 >92%  Telemetry  Continuous/intermittent Pulse Ox  Albuterol treatment PRN  Completed Ceft   Vitamin C, multi vitamin  Monitor labs every 48 hours  Advance Care Planning     Code Status - FULL CODE according to patient's wife             Acute kidney injury superimposed on CKD  Serum creatinine 4.1 (baseline 2.7)  Avoid nephrotoxic meds  Will hold Bumex and Metolazone for now as pt appears volume depleted  Gentle IV hydration  Hold ACE/ARB combination drug    6/18  Nephrology on consult  Hold Nephrotoxic meds    6/22  Likely reached baseline Cr 3  Renal Following, no indication for HD now   Avoid Nephrotoxins    6/23  Cr Stabilize at 3     6/24   Cr stable    6/25  Resolved         Sepsis  Criteria:  RR rate > 20, WBC 3.13  Lactic acid normal  B/P appropriate on arrival but did decline to 82/62 as pt is volume depleted  Blood cultures drawn  Normal saline 500 cc bolus given  Doxycyline given    6/18  Responding to gentle hydration  Monitor  Follow Cultures  ID    6/22  On Ceft  Culture NGTD     6/23  Continue current care    6/24  Improving     6/25  Continue to show improvement         Acidosis, metabolic  6/23  2/2 BROOKE   Monitor     6/24  Monitor     6/25  Improving       Hypomagnesemia  6/25  Monitor and Replete           Stage 4 chronic kidney disease  Above baseline  Appears volume overload  Hold ACE/ARB  Gentle IV hydration    6/18  Nephrology  Monitor  Gentle Hydration  Hydralazine    6/22  Nephrology recs appreciated     6/23  Cr  stabilize around 3   Monitor   Renal following     6/24  Cr stable   Renal following     6/25  CR improving   Renal following     Chronic combined systolic and diastolic congestive heart failure  EF 15 % and grade II diastolic dysfunction  Compensated currently  Pt appears volume depleted likely from poor appetite secondary to viral illness  Will hold ACE/ARB, Bumex and metolazone  Gentle IV hydration  Monitor closely for S/S of volume overload    6/18  Gentle Hydration  PICC placement  Cardiology    6/22  Not in fluid overload  Daily wts  Fluid restriction <1.5L   On BB, Iso+Hydra  Cardiology recs appreciated     6/23  Stable, not in fluid overload   Continue BB  Iso +H held due to persistently low BP  Continue wts and fluid restriction per CHF protocol  Cardiology following     6/24  Continue care as above     6/25  Not in exacerbation   Continue BB  Will follow up with Cardiology outpatient       Hypertension associated with diabetes  Borderline low blood pressure  Hold antihypertensives for now  Gentle IV hydration - spoke with Cardiology, Dr. Pantoja, who recommended gentle IV hydration    6/22  Monitor BP  Meds held this morning due to low BP     6/23  Monitor BP/FS  BP on lower side   On BB   Continue ISS      6/24  Continue current care     6/25  Monitor BP  Continue BB      VTE Risk Mitigation (From admission, onward)         Ordered     IP VTE HIGH RISK PATIENT  Once      06/17/20 1147     Place sequential compression device  Until discontinued      06/17/20 1147                      Ney Conteh MD  Department of Hospital Medicine   [facility]

## 2020-06-25 NOTE — PLAN OF CARE
Dr. Conteh would like patient set up with [facility].  Spoke with wife Elsie.  States patient has had [facility] in the past. Preference obtained.  Referral sent via [facility].     06/25/20 2707   Post-Acute Status   Post-Acute Authorization Placement   Post-Acute Placement Status Referrals Sent

## 2020-06-25 NOTE — ASSESSMENT & PLAN NOTE
- COVID-19 testing - positive result  - Infection Control notified     - Isolation:   - Airborne, Contact and Droplet Precautions  - Cohort patients into COVID units  - N95 masks must be fit tested, wear eye protection  - 20 second hand hygiene              - Limit visitors per hospital policy              - Consolidating lab draws, nursing care, provider visits, and interventions    - Diagnostics: (leukopenia, hyponatremia, hyperferritinemia, elevated troponin, elevated d-dimer, age, and comorbidities are significant predictors of poor clinical outcome)  CBC, CMP, Ferritin, CRP, LDH, Blood Culture x2 and Portable CXR    - Management:  Supplemental O2 to maintain SpO2 >92%  Telemetry  Continuous/intermittent Pulse Ox  Albuterol treatment PRN  Completed Ceft   Vitamin C, multi vitamin  Monitor labs every 48 hours  Advance Care Planning     Code Status - FULL CODE according to patient's wife

## 2020-06-25 NOTE — ASSESSMENT & PLAN NOTE
BROOKE on CKD stage 4.  No indications for acute or chronic HD at present.  Creatinine has stabilized at 2.9.  Diuretics were held. Encourage po intake. Avoid NSAIDs, ACE-I, ARB.

## 2020-06-25 NOTE — ASSESSMENT & PLAN NOTE
Borderline low blood pressure  Hold antihypertensives for now  Gentle IV hydration - spoke with Cardiology, Dr. Pantoja, who recommended gentle IV hydration    6/22  Monitor BP  Meds held this morning due to low BP     6/23  Monitor BP/FS  BP on lower side   On BB   Continue ISS      6/24  Continue current care     6/25  Monitor BP  Continue BB

## 2020-06-25 NOTE — PROGRESS NOTES
[facility]   Nephrology  Progress Note    Patient Name: Yan Choudhury  MRN: [id number]  Admission Date: 6/17/2020  Hospital Length of Stay: 8 days  Attending Provider: Ney Conteh MD   Primary Care Physician: Sandra Estevez MD  Principal Problem:COVID-19 virus infection    Subjective:     HPI: Pt was seen and examined. H/o and chart reviewed. Pt is a 64 y/o male with a h/o of CKD stage 3, severe combined diastolic and systolic CHF (EF 15%), DM, HTN, and obesity who presented with weakness and SOB. Pt was admitted for COVID-19 infection. Renal was consulted because s Cr has worsened from baseline of about 2.5 to 4.1, repeat today 3.2. Labs, meds, mgmt (both inpt and outpt) reviewed. Pt was on bumex 2 mg po tid and metolazone 5 mg po qd. No GI losses reported, denies NSAIds, no abx,, no recent infections. No other pertinent issues. Currently, he denies SOB, has no leg swelling. All the diuretics are on hold.    Interval History:     6/22/20: Creatinine has stabilized at 3.     6/23/20: Creatinine has remained at 3.     6/24/30: Creatinine has slightly improved to 2.9.     6/25/20: Creatinine has stabilized at 2.9.             Review of patient's allergies indicates:  No Known Allergies  Current Facility-Administered Medications   Medication Frequency    acetaminophen tablet 650 mg Q4H PRN    albuterol inhaler 2 puff Q6H PRN    aspirin EC tablet 81 mg QAM    carvediloL tablet 3.125 mg BID WM    dextrose 50% injection 12.5 g PRN    dextrose 50% injection 25 g PRN    dorzolamide-timolol 2-0.5% ophthalmic solution 1 drop BID    glucagon (human recombinant) injection 1 mg PRN    glucose chewable tablet 16 g PRN    glucose chewable tablet 24 g PRN    insulin aspart U-100 pen 1-10 Units QID (AC + HS) PRN    latanoprost 0.005 % ophthalmic solution 1 drop Daily    magnesium oxide tablet 400 mg BID    multivitamin tablet Daily    ondansetron injection 4 mg Q8H PRN    pantoprazole EC tablet 40  mg Daily    sodium bicarbonate tablet 650 mg TID    sodium chloride 0.9% flush 10 mL PRN       Objective:     Vital Signs (Most Recent):  Temp: 98.6 °F (37 °C) (06/25/20 1248)  Pulse: 84 (06/25/20 1248)  Resp: 18 (06/25/20 1248)  BP: 106/65 (06/25/20 1248)  SpO2: (!) 94 % (06/25/20 1248)  O2 Device (Oxygen Therapy): room air (06/25/20 1248) Vital Signs (24h Range):  Temp:  [97.7 °F (36.5 °C)-99.1 °F (37.3 °C)] 98.6 °F (37 °C)  Pulse:  [] 84  Resp:  [18-20] 18  SpO2:  [87 %-95 %] 94 %  BP: (103-112)/(61-71) 106/65     Weight: 84.5 kg (186 lb 4.6 oz) (06/24/20 0500)  Body mass index is 28.33 kg/m².  Body surface area is 2.01 meters squared.    I/O last 3 completed shifts:  In: 1226 [P.O.:1226]  Out: 500 [Urine:500]    Physical Exam     Deferred because of Covid + state.     Significant Labs:  Lab Results   Component Value Date    CREATININE 2.9 (H) 06/25/2020    BUN 89 (H) 06/25/2020     06/25/2020    K 3.7 06/25/2020     06/25/2020    CO2 22 (L) 06/25/2020     Lab Results   Component Value Date    PTH 88.0 (H) 10/08/2019    CALCIUM 8.5 (L) 06/25/2020    PHOS 4.1 06/23/2020     Lab Results   Component Value Date    ALBUMIN 1.9 (L) 06/25/2020     Lab Results   Component Value Date    WBC 5.90 06/23/2020    HGB 11.7 (L) 06/23/2020    HCT 37.4 (L) 06/23/2020    MCV 77 (L) 06/23/2020     (L) 06/23/2020       Recent Labs   Lab 06/25/20  0511   MG 1.8         Significant Imaging:  Imaging Results          X-Ray Chest AP Portable (Final result)  Result time 06/17/20 09:11:42    Final result by Rhys Blas III, MD (06/17/20 09:11:42)                 Impression:      Slight increase in the the vascular markings suggested, a little greater at the right base.  Mild congestion?  No overt failure.  Follow-up recommended      Electronically signed by: Rhys Blas MD  Date:    06/17/2020  Time:    09:11             Narrative:    EXAMINATION:  XR CHEST AP PORTABLE    CLINICAL  HISTORY:  CHF;    COMPARISON:  May 28th    FINDINGS:  The heart size remains enlarged with a permanent bipolar pacing device in position on the left.  The lungs show no consolidation or effusion.  Slight increase in the vascular markings suggested, a little greater at the right base.                                  Assessment/Plan:     Acute kidney injury superimposed on CKD  BROOKE on CKD stage 4.  No indications for acute or chronic HD at present.  Creatinine has stabilized at 2.9.  Diuretics were held. Encourage po intake. Avoid NSAIDs, ACE-I, ARB.           Sepsis  Continue antibiotics.     Acidosis, metabolic  Continue po sodium bicarbonate.     Hypomagnesemia  Continue magnesium supplements.         Thank you for your consult. I will follow-up with patient. Please contact us if you have any additional questions.    Aubrey Seth MD  Nephrology  [facility]

## 2020-06-25 NOTE — PLAN OF CARE
POC reviewed, verbalized understanding. Pt remained free from falls, fall precautions in place. Pt is V paced on monitor, 8590. VSS. No other c/o at this time. Right UA PICC intact. RA. Continue to monitor blood glucose. Call bell and personal belongings within reach. Hourly rounding complete. Reminded to call for assistance. Will continue to monitor.

## 2020-06-26 VITALS
OXYGEN SATURATION: 91 % | RESPIRATION RATE: 18 BRPM | HEIGHT: 68 IN | DIASTOLIC BLOOD PRESSURE: 68 MMHG | BODY MASS INDEX: 27.96 KG/M2 | TEMPERATURE: 98 F | SYSTOLIC BLOOD PRESSURE: 112 MMHG | WEIGHT: 184.5 LBS | HEART RATE: 78 BPM

## 2020-06-26 DIAGNOSIS — U07.1 COVID-19 VIRUS DETECTED: ICD-10-CM

## 2020-06-26 PROBLEM — N18.9 ACUTE KIDNEY INJURY SUPERIMPOSED ON CKD: Status: RESOLVED | Noted: 2020-06-17 | Resolved: 2020-06-26

## 2020-06-26 PROBLEM — N17.9 ACUTE KIDNEY INJURY SUPERIMPOSED ON CKD: Status: RESOLVED | Noted: 2020-06-17 | Resolved: 2020-06-26

## 2020-06-26 PROBLEM — E87.20 ACIDOSIS, METABOLIC: Status: RESOLVED | Noted: 2019-01-18 | Resolved: 2020-06-26

## 2020-06-26 PROBLEM — A41.9 SEPSIS: Status: RESOLVED | Noted: 2020-06-17 | Resolved: 2020-06-26

## 2020-06-26 LAB
ALBUMIN SERPL BCP-MCNC: 1.9 G/DL (ref 3.5–5.2)
ALP SERPL-CCNC: 80 U/L (ref 55–135)
ALT SERPL W/O P-5'-P-CCNC: 74 U/L (ref 10–44)
ANION GAP SERPL CALC-SCNC: 9 MMOL/L (ref 8–16)
AST SERPL-CCNC: 103 U/L (ref 10–40)
BILIRUB SERPL-MCNC: 1.6 MG/DL (ref 0.1–1)
BUN SERPL-MCNC: 87 MG/DL (ref 8–23)
CALCIUM SERPL-MCNC: 8.6 MG/DL (ref 8.7–10.5)
CHLORIDE SERPL-SCNC: 106 MMOL/L (ref 95–110)
CO2 SERPL-SCNC: 25 MMOL/L (ref 23–29)
CREAT SERPL-MCNC: 2.6 MG/DL (ref 0.5–1.4)
EST. GFR  (AFRICAN AMERICAN): 28 ML/MIN/1.73 M^2
EST. GFR  (NON AFRICAN AMERICAN): 24 ML/MIN/1.73 M^2
GLUCOSE SERPL-MCNC: 97 MG/DL (ref 70–110)
MAGNESIUM SERPL-MCNC: 1.9 MG/DL (ref 1.6–2.6)
POCT GLUCOSE: 100 MG/DL (ref 70–110)
POCT GLUCOSE: 116 MG/DL (ref 70–110)
POTASSIUM SERPL-SCNC: 3.7 MMOL/L (ref 3.5–5.1)
PROT SERPL-MCNC: 6.8 G/DL (ref 6–8.4)
SODIUM SERPL-SCNC: 140 MMOL/L (ref 136–145)

## 2020-06-26 PROCEDURE — 97116 GAIT TRAINING THERAPY: CPT

## 2020-06-26 PROCEDURE — 25000003 PHARM REV CODE 250: Performed by: INTERNAL MEDICINE

## 2020-06-26 PROCEDURE — 97110 THERAPEUTIC EXERCISES: CPT

## 2020-06-26 PROCEDURE — 25000003 PHARM REV CODE 250: Performed by: NURSE PRACTITIONER

## 2020-06-26 PROCEDURE — 80053 COMPREHEN METABOLIC PANEL: CPT

## 2020-06-26 PROCEDURE — 83735 ASSAY OF MAGNESIUM: CPT

## 2020-06-26 PROCEDURE — 94761 N-INVAS EAR/PLS OXIMETRY MLT: CPT

## 2020-06-26 RX ORDER — SODIUM BICARBONATE 650 MG/1
650 TABLET ORAL 3 TIMES DAILY
Qty: 90 TABLET | Refills: 11 | COMMUNITY
Start: 2020-06-26 | End: 2020-06-29 | Stop reason: SDUPTHER

## 2020-06-26 RX ADMIN — DORZOLAMIDE HYDROCHLORIDE AND TIMOLOL MALEATE 1 DROP: 20; 5 SOLUTION/ DROPS OPHTHALMIC at 08:06

## 2020-06-26 RX ADMIN — THERA TABS 1 TABLET: TAB at 11:06

## 2020-06-26 RX ADMIN — ASPIRIN 81 MG: 81 TABLET, COATED ORAL at 06:06

## 2020-06-26 RX ADMIN — SODIUM BICARBONATE 650 MG: 650 TABLET ORAL at 02:06

## 2020-06-26 RX ADMIN — SODIUM BICARBONATE 650 MG: 650 TABLET ORAL at 08:06

## 2020-06-26 RX ADMIN — Medication 400 MG: at 08:06

## 2020-06-26 RX ADMIN — LATANOPROST 1 DROP: 50 SOLUTION OPHTHALMIC at 08:06

## 2020-06-26 RX ADMIN — CARVEDILOL 3.12 MG: 3.12 TABLET, FILM COATED ORAL at 08:06

## 2020-06-26 RX ADMIN — PANTOPRAZOLE SODIUM 40 MG: 40 TABLET, DELAYED RELEASE ORAL at 08:06

## 2020-06-26 NOTE — DISCHARGE SUMMARY
[facility] Hospital Medicine  Discharge Summary      Patient Name: Yan Choudhury  MRN: [id number]  Admission Date: 6/17/2020  Hospital Length of Stay: 9 days  Discharge Date and Time:  06/26/2020 12:53 PM  Attending Physician: Ney Conteh MD   Discharging Provider: Ney Conteh MD  Primary Care Provider: Sandra Estevez MD      HPI:   Pt is a 64 yo male with PMHx of combined CHF with EF 15 %, CKD III, DM II and ischemic cardiomyopathy who presents to the ED due to weakness. Wife provides history as patient is a difficult historian. Wife explains pt has had a decreased appetite with increasing weakness onset for approx 6 days. Wife says that Cardiology mentioned possible dehydration. Wife says that pt has not been eating or drinking regularly with increased weakness, walking slow and increased fatigue. While in the ED, nurse states that pt was tachypnic with respiratory rate 26.   On arrival: Temp 97, pulse 74, resp 22, B/P 99/56 and pulse ox 95 on RA.   CXR - Slight increase in the the vascular markings suggested, a little greater at the right base.  Mild congestion?  No overt failure.    BNP = 1874 (last admission 2300)  Labs find WBC 3.13, Na 131, serum creatinine 4.1 (baseline 2.7), glucose 119  PT IS COVID +  Pt has received a saline bolus 250 cc in the ED. Afterwards, blood pressure noted 82/62 and a second saline bolus of 250 cc given.       * No surgery found *      Hospital Course:   6/18  Patient states his breathing has improved. Renal function worsened from prior study - Nephrology has been consulted. Discussed with Cardiology. Patient will have PICC placed to measure Cardiac Output. Patient a candidate for Remdesivir pending ID review and recommendation. Patient reports SOB + Cough.   6/22- Patient seen at bedside, resting comfortably. Continues to improves does not report any acute complaints.   6/23- Patient seen this morning at bedside, resting comfortably. Reports sleeping  well. Notes that he feels wells and has no specific complaints. T max 100.6 overnight.   6/24 - Patient seen at bedside, resting comfortably. His PICC line was removed about 4cm, new PICC line placed this morning. Patient has no acute complaints.   6/25- Patient seen this morning at bedside, resting comfortably. Had new PICC placed yesterday. Discussed working with PT today, His labs continue to improve. He has no acute complaints at this moment.   6/26- Patient seen at bedside this morning, eating breakfast. Is feeling well. Seen by PT, will be going home with Isaelator Walker, Bedside Comode and Home health today. Patient labs continue to improve. Discussed close follow up with Cardiology and Nephrology post discharge. Discussed with wife follow up care as well. Patient stable for discharge.      Consults:   Consults (From admission, onward)        Status Ordering Provider     Inpatient consult to Cardiology  Once     Provider:  Hunter Pantoja MD    Completed MARGY RENDON JR     Inpatient consult to Infectious Diseases  Once     Provider:  Mihir Diaz MD    Acknowledged JS ARMENTA     Inpatient consult to Nephrology  Once     Provider:  Juan Luis Galeas MD    Completed KENDALL TALBERT     [facility]  Once     Provider:  (Not yet assigned)    Acknowledged CHRISTIANE BETANCUR     Inpatient consult to Social Work  Once     Provider:  (Not yet assigned)    Acknowledged CHRISTIANE BETANCUR     Inpatient consult to Social Work  Once     Provider:  (Not yet assigned)    Acknowledged CHRISTIANE BETANCUR          * COVID-19 virus infection  - COVID-19 testing - positive result  - Infection Control notified     - Isolation:   - Airborne, Contact and Droplet Precautions  - Cohort patients into COVID units  - N95 masks must be fit tested, wear eye protection  - 20 second hand hygiene              - Limit visitors per hospital policy              - Consolidating lab draws,  nursing care, provider visits, and interventions    - Diagnostics: (leukopenia, hyponatremia, hyperferritinemia, elevated troponin, elevated d-dimer, age, and comorbidities are significant predictors of poor clinical outcome)  CBC, CMP, Ferritin, CRP, LDH, Blood Culture x2 and Portable CXR    - Management:  Supplemental O2 to maintain SpO2 >92%  Telemetry  Continuous/intermittent Pulse Ox  Albuterol treatment PRN  Completed Ceft   Vitamin C, multi vitamin  Monitor labs every 48 hours  Advance Care Planning     Code Status - FULL CODE according to patient's wife    6/26  Patient to be discharged with Home Oxygen   Isolation Precautions discussed  Follow up with PCP              Stage 4 chronic kidney disease  Above baseline  Appears volume overload  Hold ACE/ARB  Gentle IV hydration    6/18  Nephrology  Monitor  Gentle Hydration  Hydralazine    6/22  Nephrology recs appreciated     6/23  Cr stabilize around 3   Monitor   Renal following     6/24  Cr stable   Renal following     6/25  CR improving   Renal following     6/26  Follow up with Nephrology     Chronic combined systolic and diastolic congestive heart failure  EF 15 % and grade II diastolic dysfunction  Compensated currently  Pt appears volume depleted likely from poor appetite secondary to viral illness  Will hold ACE/ARB, Bumex and metolazone  Gentle IV hydration  Monitor closely for S/S of volume overload    6/18  Gentle Hydration  PICC placement  Cardiology    6/22  Not in fluid overload  Daily wts  Fluid restriction <1.5L   On BB, Iso+Hydra  Cardiology recs appreciated     6/23  Stable, not in fluid overload   Continue BB  Iso +H held due to persistently low BP  Continue wts and fluid restriction per CHF protocol  Cardiology following     6/24  Continue care as above     6/25  Not in exacerbation   Continue BB  Will follow up with Cardiology outpatient     6/26  Reinforced fluid and medication compliance  Continue home meds  Follow up with Cardiology        Hypertension associated with diabetes  Borderline low blood pressure  Hold antihypertensives for now  Gentle IV hydration - spoke with Cardiology, Dr. Pantoja, who recommended gentle IV hydration    6/22  Monitor BP  Meds held this morning due to low BP     6/23  Monitor BP/FS  BP on lower side   On BB   Continue ISS      6/24  Continue current care     6/25  Monitor BP  Continue BB    6/26  Continue home meds      Final Active Diagnoses:    Diagnosis Date Noted POA    PRINCIPAL PROBLEM:  COVID-19 virus infection [U07.1] 06/17/2020 Yes    Stage 4 chronic kidney disease [N18.4] 05/22/2015 Yes    Chronic combined systolic and diastolic congestive heart failure [I50.42] 03/24/2015 Yes    Hypertension associated with diabetes [E11.59, I10]  Yes      Problems Resolved During this Admission:    Diagnosis Date Noted Date Resolved POA    Prerenal acute renal failure [N17.9]  06/22/2020 Yes    Sepsis [A41.9] 06/17/2020 06/26/2020 Yes    Acute kidney injury superimposed on CKD [N17.9, N18.9] 06/17/2020 06/26/2020 Yes    Acidosis, metabolic [E87.2] 01/18/2019 06/26/2020 Yes    Hypomagnesemia [E83.42] 04/23/2016 06/26/2020 Yes       Discharged Condition: stable    Disposition: Home or Self Care    Follow Up:  Follow-up Information     Hunter Pantoja MD In 2 weeks.    Specialties: Cardiology, INTERVENTIONAL CARDIOLOGY  Contact information:  [id number]  [place] [id number]  [phone]             Juan Luis Galeas MD In 2 weeks.    Specialty: Nephrology  Contact information:  [id number]  [place] [address]  [phone]                 Patient Instructions:      OXYGEN FOR HOME USE     Order Specific Question Answer Comments   Liter Flow 2    Duration Continuous    Qualifying SpO2: 87    Testing done at: Rest    Route nasal cannula    Device home concentrator with portable unit    Length of need (in months): 99 mos    Patient condition with qualifying saturation other chronic pulmonary  "condition COVID   Height: 5' 8" (1.727 m)    Weight: 83.7 kg (184 lb 8.4 oz)    Does patient have medical equipment at home? glucometer    Does patient have medical equipment at home? CPAP    Alternative treatment measures have been tried or considered and deemed clinically ineffective. Yes        Significant Diagnostic Studies: Labs: All labs within the past 24 hours have been reviewed    Pending Diagnostic Studies:     Procedure Component Value Units Date/Time    Comprehensive metabolic panel [[id number]] Collected: 06/21/20 0500    Order Status: Sent Lab Status: No result     Specimen: Blood          Medications:  Reconciled Home Medications:      Medication List      START taking these medications    sodium bicarbonate 650 MG tablet  Take 1 tablet (650 mg total) by mouth 3 (three) times daily.        CONTINUE taking these medications    albuterol 90 mcg/actuation inhaler  Commonly known as: PROVENTIL/VENTOLIN HFA  Inhale 2 puffs into the lungs every 6 (six) hours as needed for Wheezing. Rescue     allopurinoL 100 MG tablet  Commonly known as: ZYLOPRIM  Take 1 tablet (100 mg total) by mouth once daily.     aspirin 81 MG EC tablet  Commonly known as: ECOTRIN  Take 81 mg by mouth every morning.     BD INSULIN SYRINGE ULTRA-FINE 0.5 mL 31 gauge x 5/16" Syrg  Generic drug: insulin syringe-needle U-100  USE AS DIRECTED TO INJECT INSULIN TWO TIMES DAILY     bimatoprost 0.03 % ophthalmic drops  Commonly known as: LUMIGAN  Place 1 drop into the left eye every evening.     blood sugar diagnostic Strp  1 strip by Misc.(Non-Drug; Combo Route) route 4 (four) times daily. Telcare     brimonidine-timoloL 0.2-0.5 % Drop  Commonly known as: COMBIGAN  Place 1 drop into both eyes every 12 (twelve) hours.     bumetanide 2 MG tablet  Commonly known as: BUMEX  TAKE 1 TABLET (2 MG TOTAL) BY MOUTH three times a day     carvediloL 3.125 MG tablet  Commonly known as: COREG  Take 1 tablet (3.125 mg total) by mouth 2 (two) times daily with " "meals.     dorzolamide-timolol 2-0.5% 22.3-6.8 mg/mL ophthalmic solution  Commonly known as: COSOPT  Place 1 drop into both eyes 2 (two) times daily.     ENTRESTO 24-26 mg per tablet  Generic drug: sacubitriL-valsartan  TAKE 1 TABLET BY MOUTH TWICE A DAY     GERITOL ORAL  Take by mouth.     hydrOXYzine HCL 10 MG Tab  Commonly known as: ATARAX  Take 1 tablet (10 mg total) by mouth daily as needed.     insulin glargine 100 unit/mL injection  Commonly known as: LANTUS U-100 INSULIN  INJECT 20 UNITS ONCE DAILY AS NEEDED WHEN BS< 150     ketoconazole 2 % cream  Commonly known as: NIZORAL  AAA bid prn rash of groin. For Flares.     lancets Misc  For tel care     latanoprost 0.005 % ophthalmic solution  Commonly known as: XALATAN  Place 1 drop into both eyes once daily.     MEN'S 50 PLUS MULTIVITAMIN ORAL  Take by mouth.     pantoprazole 40 MG tablet  Commonly known as: PROTONIX  TAKE 1 TABLET BY MOUTH EVERY DAY FOR ACID REFLUX     pen needle, diabetic 32 gauge x 5/32" Ndle  Commonly known as: BD ULTRA-FINE JOSLYN PEN NEEDLE  1 each by Misc.(Non-Drug; Combo Route) route 4 (four) times daily.     simvastatin 10 MG tablet  Commonly known as: ZOCOR  Take 1 tablet (10 mg total) by mouth every evening.     VITAMIN D3 25 mcg (1,000 unit) capsule  Generic drug: cholecalciferol (vitamin D3)  Take 1,000 Units by mouth once daily.        STOP taking these medications    metOLazone 5 MG tablet  Commonly known as: ZAROXOLYN            Indwelling Lines/Drains at time of discharge:   Lines/Drains/Airways     Peripherally Inserted Central Catheter Line            PICC Double Lumen 06/24/20 0915 right brachial 2 days                Time spent on the discharge of patient: greater than 35 minutes  Patient was seen and examined on the date of discharge and determined to be suitable for discharge.         Ney Conteh MD  Department of Hospital Medicine  [facility]  "

## 2020-06-26 NOTE — ASSESSMENT & PLAN NOTE
EF 15 % and grade II diastolic dysfunction  Compensated currently  Pt appears volume depleted likely from poor appetite secondary to viral illness  Will hold ACE/ARB, Bumex and metolazone  Gentle IV hydration  Monitor closely for S/S of volume overload    6/18  Gentle Hydration  PICC placement  Cardiology    6/22  Not in fluid overload  Daily wts  Fluid restriction <1.5L   On BB, Iso+Hydra  Cardiology recs appreciated     6/23  Stable, not in fluid overload   Continue BB  Iso +H held due to persistently low BP  Continue wts and fluid restriction per CHF protocol  Cardiology following     6/24  Continue care as above     6/25  Not in exacerbation   Continue BB  Will follow up with Cardiology outpatient     6/26  Reinforced fluid and medication compliance  Continue home meds  Follow up with Cardiology

## 2020-06-26 NOTE — PLAN OF CARE
POC reviewed, verbalized understanding. Pt remained free from falls, fall precautions in place. Pt is AV paced on monitor, 8590. VSS. No other c/o at this time. PIV intact. 2L NC. Call bell and personal belongings within reach. Hourly rounding complete. Reminded to call for assistance. Will continue to monitor.

## 2020-06-26 NOTE — ASSESSMENT & PLAN NOTE
Borderline low blood pressure  Hold antihypertensives for now  Gentle IV hydration - spoke with Cardiology, Dr. Pantoja, who recommended gentle IV hydration    6/22  Monitor BP  Meds held this morning due to low BP     6/23  Monitor BP/FS  BP on lower side   On BB   Continue ISS      6/24  Continue current care     6/25  Monitor BP  Continue BB    6/26  Continue home meds

## 2020-06-26 NOTE — ASSESSMENT & PLAN NOTE
- COVID-19 testing - positive result  - Infection Control notified     - Isolation:   - Airborne, Contact and Droplet Precautions  - Cohort patients into COVID units  - N95 masks must be fit tested, wear eye protection  - 20 second hand hygiene              - Limit visitors per hospital policy              - Consolidating lab draws, nursing care, provider visits, and interventions    - Diagnostics: (leukopenia, hyponatremia, hyperferritinemia, elevated troponin, elevated d-dimer, age, and comorbidities are significant predictors of poor clinical outcome)  CBC, CMP, Ferritin, CRP, LDH, Blood Culture x2 and Portable CXR    - Management:  Supplemental O2 to maintain SpO2 >92%  Telemetry  Continuous/intermittent Pulse Ox  Albuterol treatment PRN  Completed Ceft   Vitamin C, multi vitamin  Monitor labs every 48 hours  Advance Care Planning     Code Status - FULL CODE according to patient's wife    6/26  Patient to be discharged with Home Oxygen   Isolation Precautions discussed  Follow up with PCP

## 2020-06-26 NOTE — PROGRESS NOTES
Home Oxygen Evaluation    Date Performed: 6/26/2020    1) Patient's Home O2 Sat on room air, while at rest: 87%.        If O2 sats on room air at rest are 88% or below, patient qualifies. No additional testing needed. Document N/A in steps 2 and 3. If 89% or above, complete steps 2.      2) Patient's O2 Sat on room air while exercising: NA        If O2 sats on room air while exercising remain 89% or above patient does not qualify, no further testing needed Document N/A in step 3. If O2 sats on room air while exercising are 88% or below, continue to step 3.      3) Patient's O2 Sat while exercising on O2: NA  at NA LPM         (Must show improvement from #2 for patients to qualify)    If O2 sats improve on oxygen, patient qualifies for portable oxygen. If not, the patient does not qualify.

## 2020-06-26 NOTE — NURSING
Discharged order received and reviewed with patient and family. Patient instructed when to take each medication next dose. Prescriptions received. IV removed, tele removed. Patient assisted with dressing by staff. Patient awaiting transportation home.

## 2020-06-26 NOTE — PT/OT/SLP PROGRESS
Physical Therapy  Treatment    Yan Choudhury   MRN: [id number]   Admitting Diagnosis: COVID-19 virus infection    PT Received On: 06/25/20  PT Start Time: 1250     PT Stop Time: 1315    PT Total Time (min): 25 min       Billable Minutes:  Gait Training 15 and Therapeutic Exercise 10    Treatment Type: Treatment  PT/PTA: PT             General Precautions: Standard, airborne, contact, fall, droplet  Orthopedic Precautions: N/A   Braces: N/A         Subjective:  Communicated with RN prior to session. Patient very confused      Pain/Comfort  Pain Rating 1: 0/10    Objective:   Patient found with: telemetry, bed alarm    Functional Mobility:  Bed Mobility: supine to/from sit cga for coord of movements/use of bed rail/safety       Transfers: sit to/from stand with ue support cga for safe hand placement       Gait: Amb 15ft ue support min A for balance/safety/furniture & wall for support; patient could not understand how to use a RW despite max cues       Stairs:  n/a    Balance:   Static Sit: G  Dynamic Sit: F+  Static Stand: F with ue support  Dynamic stand: P+ with ue support       Therapeutic Activities and Exercises:  PT educated patient on POC, B LE TE to prep mobility and safety/fall precautions with mobility     AM-PAC 6 CLICK MOBILITY  How much help from another person does this patient currently need?   1 = Unable, Total/Dependent Assistance  2 = A lot, Maximum/Moderate Assistance  3 = A little, Minimum/Contact Guard/Supervision  4 = None, Modified [place]/Independent    Turning over in bed (including adjusting bedclothes, sheets and blankets)?: 3  Sitting down on and standing up from a chair with arms (e.g., wheelchair, bedside commode, etc.): 3  Moving from lying on back to sitting on the side of the bed?: 3  Moving to and from a bed to a chair (including a wheelchair)?: 3  Need to walk in hospital room?: 3  Climbing 3-5 steps with a railing?: 1  Basic Mobility Total Score: 16    AM-PAC Raw Score CMS G-Code  Modifier Level of Impairment Assistance   6 % Total / Unable   7 - 9 CM 80 - 100% Maximal Assist   10 - 14 CL 60 - 80% Moderate Assist   15 - 19 CK 40 - 60% Moderate Assist   20 - 22 CJ 20 - 40% Minimal Assist   23 CI 1-20% SBA / CGA   24 CH 0% Independent/ Mod I     Patient left HOB elevated with all lines intact, call button in reach, bed alarm on and RN notified.    Assessment:  Yan Choudhury is a 65 y.o. male with a medical diagnosis of COVID-19 virus infection and presents with impaired mobility/dec act mars.    Rehab identified problem list/impairments: Rehab identified problem list/impairments: weakness, impaired endurance, visual deficits, impaired cognition, decreased coordination, impaired self care skills, decreased upper extremity function, decreased lower extremity function, impaired functional mobilty, gait instability, decreased safety awareness, impaired balance    Rehab potential is fair.    Activity tolerance: Fair    Discharge recommendations: Discharge Facility/Level of Care Needs: nursing facility, skilled     Barriers to discharge:      Equipment recommendations: Equipment Needed After Discharge: walker, rolling, bedside commode, shower chair     GOALS:   Multidisciplinary Problems     Physical Therapy Goals        Problem: Physical Therapy Goal    Goal Priority Disciplines Outcome Goal Variances Interventions   Physical Therapy Goal     PT, PT/OT Ongoing, Progressing     Description: 1. Patient will perform supine to/from sit sba  2. Patient will perform sit to/from stand with least AD sba  3. Patient will amb 100ft RW sba no gross LOB                   PLAN:    Patient to be seen 5 x/week  to address the above listed problems via gait training, therapeutic activities, therapeutic exercises  Plan of Care expires: 07/02/20  Plan of Care reviewed with: patient         Fran Tavera, PT  06/26/2020

## 2020-06-26 NOTE — PLAN OF CARE
Bed mobility cga for safety; transfers min/cga with ue support; amb 15ft with ue support min a for balance; very unsafe and unsteady; not safe to go home without 24hr spv/assist from family; PT recommends SNF

## 2020-06-26 NOTE — PT/OT/SLP EVAL
Physical Therapy Evaluation    Patient Name:  Yan Choudhury   MRN:  [id number]    Recommendations:     Discharge Recommendations:  nursing facility, skilled, home health PT, home health OT, home(pending progress made; if home will need family spv/assist)   Discharge Equipment Recommendations: walker, rolling, bedside commode, shower chair(make final recs closer to d/c pending progress)   Barriers to discharge: Decreased caregiver support    Assessment:     Yan Choudhury is a 65 y.o. male admitted with a medical diagnosis of COVID-19 virus infection.  He presents with the following impairments/functional limitations:  weakness, gait instability, impaired balance, impaired endurance, decreased lower extremity function, decreased safety awareness, impaired cognition, impaired self care skills, impaired functional mobilty, decreased coordination.    Rehab Prognosis: Fair; patient would benefit from acute skilled PT services to address these deficits and reach maximum level of function.    Recent Surgery: * No surgery found *      Plan:     During this hospitalization, patient to be seen 5 x/week to address the identified rehab impairments via gait training, therapeutic activities, therapeutic exercises and progress toward the following goals:    · Plan of Care Expires:  07/02/20    Subjective     Chief Complaint: weakness; sob/fatigue  Patient/Family Comments/goals: to get stronger; return home; feel better  Pain/Comfort:  · Pain Rating 1: 0/10    Patients cultural, spiritual, Bahai conflicts given the current situation:      Living Environment:  Lives with family; 3 steps with 1 rail to enter  Prior to admission, patients level of function was indep.  Equipment used at home:  .  DME owned (not currently used): none.  Upon discharge, patient will have assistance from family/home health services; may need snf first pending progress made in acute crae.    Objective:     Communicated with RN prior to session.  Patient  found HOB elevated with oxygen, telemetry, bed alarm  upon PT entry to room.    General Precautions: Standard, fall, respiratory, airborne, contact, droplet   Orthopedic Precautions:N/A   Braces: N/A     Exams:  · B LE strength grossly 4-/5; rom wfl    Functional Mobility:  · Bed Mobility - supine to/from sit min a and cues for log-rolling technique with use of bed rail  · Transfers - sit to/from stand with ue support and min a; cues for safe hand placement; stand bathroom adl with min/cga and ue support  · Gt - 8ft total in room min A with ue support; dec speed and cues for erect posture/pace breathing    Therapeutic Activities and Exercises:   PT educated patient on POC, B LE TE to prep mobility and safety/fall precautions with mobility    AM-PAC 6 CLICK MOBILITY  Total Score:16     Patient left HOB elevated with all lines intact, call button in reach, bed alarm on and RN notified.    GOALS:   Multidisciplinary Problems     Physical Therapy Goals        Problem: Physical Therapy Goal    Goal Priority Disciplines Outcome Goal Variances Interventions   Physical Therapy Goal     PT, PT/OT      Description: 1. Patient will perform supine to/from sit sba  2. Patient will perform sit to/from stand with least AD sba  3. Patient will amb 100ft RW sba no gross LOB                   History:     Past Medical History:   Diagnosis Date    Arthritis     CAD (coronary artery disease)     Cataract     Chronic combined systolic and diastolic congestive heart failure 3/24/2015    CKD (chronic kidney disease), stage III     Diabetes mellitus type II      AM    Diabetic retinopathy     anti Veg F injections for macular edema    Diverticulitis of colon     ED (erectile dysfunction)     Encounter for blood transfusion     Glaucoma     HTN (hypertension)     Hyperlipidemia     Ischemic cardiomyopathy     Obesity     JAHAIRA on CPAP     Pacemaker     Pulmonary HTN        Past Surgical History:   Procedure Laterality  Date    CARDIAC CATHETERIZATION  2009    CHOLECYSTECTOMY      COLON SURGERY      Sigmoid resection    COLONOSCOPY N/A 10/11/2018    Procedure: COLONOSCOPY;  Surgeon: Quinn Aragon MD;  Location: [facility] ENDO;  Service: General;  Laterality: N/A;    EYE SURGERY      FLEXIBLE SIGMOIDOSCOPY N/A 1/18/2019    Procedure: SIGMOIDOSCOPY, FLEXIBLE;  Surgeon: Quinn Aragon MD;  Location: [facility] ENDO;  Service: General;  Laterality: N/A;    ILEOSTOMY N/A 10/30/2018    Procedure: CREATION, ILEOSTOMY;  Surgeon: Kevin Lindsay MD;  Location: [facility] OR;  Service: General;  Laterality: N/A;    Ileostomy reversal  01/2019    KNEE SURGERY      MOBILIZATION OF SPLENIC FLEXURE N/A 10/30/2018    Procedure: MOBILIZATION, SPLENIC FLEXURE;  Surgeon: Kevin Lindsay MD;  Location: [facility] OR;  Service: General;  Laterality: N/A;    REVISION COLOSTOMY N/A 10/30/2018    Procedure: REVISION OR CLOSURE, COLOSTOMY;  Surgeon: Kevin Lindsay MD;  Location: [facility] OR;  Service: General;  Laterality: N/A;  Parastomal Hernia Repair    RIGHT HEMICOLECTOMY Right 10/30/2018    Procedure: HEMICOLECTOMY, RIGHT;  Surgeon: Kevin Lindsay MD;  Location: [facility] OR;  Service: General;  Laterality: Right;       Time Tracking:     PT Received On: 06/25/20  PT Start Time: 1555     PT Stop Time: 1620  PT Total Time (min): 25 min     Billable Minutes: Evaluation 15 and Therapeutic Activity 10      Fran Tavera, PT  06/25/2020

## 2020-06-26 NOTE — ASSESSMENT & PLAN NOTE
Above baseline  Appears volume overload  Hold ACE/ARB  Gentle IV hydration    6/18  Nephrology  Monitor  Gentle Hydration  Hydralazine    6/22  Nephrology recs appreciated     6/23  Cr stabilize around 3   Monitor   Renal following     6/24  Cr stable   Renal following     6/25  CR improving   Renal following     6/26  Follow up with Nephrology

## 2020-06-26 NOTE — PLAN OF CARE
All mobility min A; fatigues quickly; dec cognition; amb 8ft min a dec balance/unsteady/holding onto furniture; rec hhpt with family assist/spv vs snf at this time

## 2020-06-29 ENCOUNTER — PATIENT OUTREACH (OUTPATIENT)
Dept: ADMINISTRATIVE | Facility: CLINIC | Age: 65
End: 2020-06-29

## 2020-06-29 RX ORDER — SODIUM BICARBONATE 650 MG/1
650 TABLET ORAL 3 TIMES DAILY
Qty: 90 TABLET | Refills: 11 | COMMUNITY
Start: 2020-06-29 | End: 2021-06-29

## 2020-06-29 NOTE — PATIENT INSTRUCTIONS

## 2020-06-29 NOTE — PLAN OF CARE
06/29/20 1707   Final Note   Assessment Type Final Discharge Note   Anticipated Discharge Disposition Home-Health   Right Care Referral Info   Post Acute Recommendation Home-care   [facility] [place]

## 2020-06-29 NOTE — TELEPHONE ENCOUNTER
----- Message from Bobby Ma sent at 6/29/2020  1:54 PM CDT -----  Regarding: Patient  The patient was prescribed Rx(sodium bicarbonate 650 MG tablet) when discharged from the hospital on 06/25/2020. The pharmacy has still not received the prescription. Please call back at [phone] (home)       [facility]/pharmacy #[id number] - [place] - [address] [facility] AT AT [facility]  [address] [facility] [address]  [place] [address]  Phone: [phone] Fax: [phone]

## 2020-06-29 NOTE — TELEPHONE ENCOUNTER
----- Message from Bobby Ma sent at 6/29/2020  1:54 PM CDT -----  Regarding: Patient  The patient was prescribed Rx(sodium bicarbonate 650 MG tablet) when discharged from the hospital on 06/25/2020. The pharmacy has still not received the prescription. Please call back at [phone] (home)       [facility]/pharmacy #[id number] - [place] - [address] AT AT [facility]  [address]  [place] [address]  Phone: [phone] Fax: [phone]

## 2020-06-29 NOTE — TELEPHONE ENCOUNTER
S/w pt and was able to schedule hospital f/u and send in refill request. Pt voiced understanding./Elvi

## 2020-06-30 ENCOUNTER — HOSPITAL ENCOUNTER (OUTPATIENT)
Dept: CARDIOLOGY | Facility: HOSPITAL | Age: 65
Discharge: HOME | End: 2020-06-30
Attending: INTERNAL MEDICINE
Payer: MEDICARE

## 2020-06-30 ENCOUNTER — CLINICAL SUPPORT (OUTPATIENT)
Dept: CARDIOLOGY | Facility: CLINIC | Age: 65
End: 2020-06-30
Payer: MEDICARE

## 2020-06-30 DIAGNOSIS — I47.29 NSVT (NONSUSTAINED VENTRICULAR TACHYCARDIA): ICD-10-CM

## 2020-06-30 DIAGNOSIS — Z95.810 ICD (IMPLANTABLE CARDIOVERTER-DEFIBRILLATOR) IN PLACE: ICD-10-CM

## 2020-06-30 DIAGNOSIS — I25.5 ISCHEMIC CARDIOMYOPATHY: ICD-10-CM

## 2020-06-30 DIAGNOSIS — I50.42 CHRONIC COMBINED SYSTOLIC AND DIASTOLIC CONGESTIVE HEART FAILURE: ICD-10-CM

## 2020-06-30 LAB
AV DELAY - LONGEST: 160 MSEC
AV DELAY - SHORTEST: 110 MSEC
CHARGE TIME (SEC): 9.8 SEC
HV IMPEDANCE (OHM): 52 OHM
IMPEDANCE RA LEAD (DONOR): 750 OHMS
IMPEDANCE RA LEAD (NATIVE): 410 OHMS
IMPEDANCE RA LEAD: 340 OHMS
OHS CV DC PP MS1: 0.5 MS
OHS CV DC PP MS2: 0.5 MS
OHS CV DC PP MS3: 0.5 MS
OHS CV DC PP V1: NORMAL V
OHS CV DC PP V2: NORMAL V
OHS CV DC PP V3: NORMAL V
P/R-WAVE RA LEAD (NATIVE): 12 MV
P/R-WAVE RA LEAD: 3.8 MV
THRESHOLD MS RA LEAD (DONOR): 0.5 MS
THRESHOLD MS RA LEAD (NATIVE): 0.5 MS
THRESHOLD MS RA LEAD: 0.5 MS
THRESHOLD V RA LEAD (DONOR): 0.75 V
THRESHOLD V RA LEAD (NATIVE): 0.5 V
THRESHOLD V RA LEAD: 0.75 V
VV DELAY: 15 MSEC

## 2020-06-30 PROCEDURE — 93295 CARDIAC DEVICE CHECK - REMOTE: ICD-10-PCS | Mod: ,,, | Performed by: INTERNAL MEDICINE

## 2020-06-30 PROCEDURE — 93296 REM INTERROG EVL PM/IDS: CPT

## 2020-06-30 PROCEDURE — 93295 DEV INTERROG REMOTE 1/2/MLT: CPT | Mod: ,,, | Performed by: INTERNAL MEDICINE

## 2020-06-30 RX ORDER — ALLOPURINOL 100 MG/1
TABLET ORAL
Qty: 90 TABLET | Refills: 0 | Status: SHIPPED | OUTPATIENT
Start: 2020-06-30 | End: 2020-10-14 | Stop reason: SDUPTHER

## 2020-06-30 RX ORDER — SODIUM BICARBONATE 650 MG/1
650 TABLET ORAL 3 TIMES DAILY
Qty: 90 TABLET | Refills: 11 | COMMUNITY
Start: 2020-06-30 | End: 2021-06-30

## 2020-06-30 NOTE — PROGRESS NOTES
Established Patient - Audio Only Telehealth Visit     The patient location is: home  The chief complaint leading to consultation is: chf   Visit type: Virtual visit with audio only (telephone)  Total time spent with patient: 20min       The reason for the audio only service rather than synchronous audio and video virtual visit was related to technical difficulties or patient preference/necessity.     Each patient to whom I provide medical services by telemedicine is:  (1) informed of the relationship between the physician and patient and the respective role of any other health care provider with respect to management of the patient; and (2) notified that they may decline to receive medical services by telemedicine and may withdraw from such care at any time. Patient verbally consented to receive this service via voice-only telephone call.     48hr chf hospital discharge follow up call    I spoke with pt. He reports he is doing better and getting stronger each day  bp has been on the lower side. Some 80's systolic  Today 119/75. He denies any LH or dizziness, just feeling more fatigues. Ambulating better, he is using a cane. Says he was supposed to get a walker at hospital d/c but has not received this.   Denies any worsening sob/real.  No PND or orthopnea.  Denies any swelling.    Medications have been reviewed and reconciled.    sodium bicarbonate on hospital d/c med list. He has not received this. I will sent to [facility] and see if they can deliver or mail to pt as he or his wife is unable to drive right now.   She says their son is going to be transportation to f/u appt but has to take off of work for this.      Pt and wife confirmed appt scheduled with Salvador Young NP-C on 7/14/20.                    This service was not originating from a related E/M service provided within the previous 7 days nor will  to an E/M service or procedure within the next 24 hours or my soonest available appointment.   Prevailing standard of care was able to be met in this audio-only visit.

## 2020-07-01 NOTE — TELEPHONE ENCOUNTER
----- Message from Dionicio Dickinson sent at 7/1/2020  4:11 PM CDT -----  Regarding: Pt Advice  Contact: Carolynn ([facility] )  Name of Who is Calling: Carolynn ([facility] )      What is the request in detail: Would like to speak with staff in regards to patient discharge papers and medication that is on documentation; but does not have a prescription for. Please advise      Can the clinic reply by [facility]: no      What Number to Call Back if not in [facility]: [phone]

## 2020-07-01 NOTE — TELEPHONE ENCOUNTER
Attempt to contact Carolynn ([facility] ) no answer and vm unavailable.  maurilio

## 2020-07-02 ENCOUNTER — TELEPHONE (OUTPATIENT)
Dept: INTERNAL MEDICINE | Facility: CLINIC | Age: 65
End: 2020-07-02

## 2020-07-02 ENCOUNTER — TELEPHONE (OUTPATIENT)
Dept: NEPHROLOGY | Facility: CLINIC | Age: 65
End: 2020-07-02

## 2020-07-02 DIAGNOSIS — U07.1 COVID-19 VIRUS DETECTED: ICD-10-CM

## 2020-07-02 NOTE — TELEPHONE ENCOUNTER
----- Message from Savannah Mills sent at 7/2/2020  9:06 AM CDT -----  Regarding: Rx that  the pt didn't sent home with  Contact: jose with [employer]  Caller is calling in regards to an Rx that  the pt didn't sent home with. Please call back at [phone]. Thanks.

## 2020-07-02 NOTE — TELEPHONE ENCOUNTER
S/w pt caregiver and informed that medication was sent into the pharmacy by pt nephrologist and to reach out to that provider to inquire about the medication refill. Pt caregiver voiced understanding./Elvi

## 2020-07-02 NOTE — TELEPHONE ENCOUNTER
----- Message from Kaylynn Mueller sent at 7/2/2020  9:40 AM CDT -----  Contact: Jessica/wife  Bee would like a call back at  [phone], Regards to his medication for Sodium Bicarbonate 650 MG . She was advise by the pharmacy that nothing has been sent in for him and he been out of it for a week now.    Thanks  Td

## 2020-07-07 ENCOUNTER — OUTPATIENT CASE MANAGEMENT (OUTPATIENT)
Dept: ADMINISTRATIVE | Facility: OTHER | Age: 65
End: 2020-07-07

## 2020-07-07 DIAGNOSIS — L29.9 PRURITIC DISORDER: ICD-10-CM

## 2020-07-07 RX ORDER — HYDROXYZINE HYDROCHLORIDE 10 MG/1
10 TABLET, FILM COATED ORAL DAILY PRN
Qty: 30 TABLET | Refills: 0 | Status: SHIPPED | OUTPATIENT
Start: 2020-07-07

## 2020-07-07 NOTE — PROGRESS NOTES
07/07/20-COVID 19 ON 06/17/20 POSITIVE. Admitted to hospital on 06/17/20 with weakness and loss of appetite, discharged home on 06/26/20 with oxygen 2L per n/c continuous and [facility].Called and spoke to wifeJessica since patient is in isolation. Pulse ox is running between 93% to 97%. Patient and wife are getting food from food bank. They are on the list for meals on wheels. Reviewed COVID 19 care plan with wife, verbalized understanding.       PLAN-  Follow up next week  07/08-labs  07/11-nephrology appt  07/14-PCP and cardiology appts.  Review Medications  after appts  Declines SW - Will mail senior resource book and RN contact info     Outpatient Care Management  COVID-19 Patient Assessment    Patient: Yan Choudhury  MRN: [id number]  Date of Service: 07/07/2020  Completed by: Rajani Almeida RN  Program: COVID-19    Reason for Visit   Patient presents with    COVID-19 Concerns    OPCM Enrollment Call    Initial Assessment    PHQ-9    Plan Of Care       Brief Summary: see above note     Assessment Documentation     COVID-19 Assessment    Nurse Assessment via Chart Review:  Was patient on a ventilator while hospitalized?: No  Nurse Assessment with Patient:  When did you test positive for COVID-19?: 6/17/20  Are you currently experiencing any of the following symptoms?:  (Comment: loss of appettite and weakness )  For Fever:  For Coughing:  For Difficulty Breathing:  Are you taking any medications to help with your breathing?: Yes  If yes, which medications are you taking?: Albuterol  Any medical equipment in use?: Yes  Oxygen?: Yes  Nebulizer?: No  Other? (use comment): No  On a normal day, what is your energy level?: 6  What is your current energy level?: 5  Psycho/Social Assessment:  Do you have a support person/caregiver?: Yes (Comment: wife-Jessica)  Are you and/or your caregiver able to access food?: Yes  Are you and/or your caregiver able to access medications?: Yes  Are you able to isolate  yourself from other family members?: Yes  Is anyone else in your home ill with COVID-19?: No  Does patient need any mental health/emotional support resources?: No  Were you working prior to the COVID-19 illness?: No  Are you still employed?: No  Are you able to work from home?: No  Are you experiencing financial difficulties related to the COVID-19 pandemic?: No  Do you need access to Community Resources?: No  COVID-19 Patient Assessment Complete (michael Yes only if assessment is finished): Yes         Problem List and History     Patient Active Problem List   Diagnosis    Hypertension associated with diabetes    ED (erectile dysfunction)    Ischemic cardiomyopathy    Pulmonary HTN    Hyperlipidemia associated with type 2 diabetes mellitus    Loss of eye - Right Eye    Abnormal ECG    LBBB (left bundle branch block)    Abnormal stress test    Chronic combined systolic and diastolic congestive heart failure    Cysts of eyelids    Stage 4 chronic kidney disease    Primary open-angle glaucoma, moderate stage    Biventricular ICD (implantable cardioverter-defibrillator) in place    Type 2 diabetes mellitus, with long-term current use of insulin    Gastroesophageal reflux disease    Chronic gout without tophus    Vitamin D deficiency    Onychomycosis of multiple toenails with type 2 diabetes mellitus    Prosthetic eye globe    Screening for colon cancer    History of colostomy reversal    Nocturnal hypoxemia    Alteration in skin integrity    Nonrheumatic aortic valve insufficiency    NSVT (nonsustained ventricular tachycardia)    Hyperparathyroidism    CAD, multiple vessel    Acute on chronic combined systolic and diastolic  heart failure with EF 15-20%    Class 2 severe obesity due to excess calories with serious comorbidity and body mass index (BMI) of 35.0 to 35.9 in adult    Chronic ischemic heart disease    Elevated LFTs    COVID-19 virus infection       Reviewed Active Problem List  with patient and/or Caregiver. The following were identified as areas of need: COVID 19    Medical History:  Reviewed medical history with patient and/or caregiver    Social History:  Reviewed social history with patient and/or caregiver    Complex Care Plan    Care plan was discussed and completed today with input from patient and/or caregiver.    Patient Instructions     Instructions were provided via the [facility] patient resources and are available for the patient to view on the patient portal, if active.    Next steps: see above note     Follow up in about 1 week (around 7/14/2020) for RN Follow up call.    Todays OPCM Self-Management Care Plan was developed with the patients/caregivers input and was based on identified barriers from todays assessment.  Goals were written today with the patient/caregiver and the patient has agreed to work towards these goals to improve his/her overall well-being. Patient verbalized understanding of the care plan, goals, and all of today's instructions. Encouraged patient/caregiver to communicate with his/her physician and health care team about health conditions and the treatment plan.  Provided my contact information today and encouraged patient/caregiver to call me with any questions as needed.

## 2020-07-08 ENCOUNTER — LAB VISIT (OUTPATIENT)
Dept: INTERNAL MEDICINE | Facility: CLINIC | Age: 65
End: 2020-07-08
Payer: MEDICARE

## 2020-07-08 DIAGNOSIS — U07.1 COVID-19 VIRUS DETECTED: ICD-10-CM

## 2020-07-08 PROCEDURE — U0003 INFECTIOUS AGENT DETECTION BY NUCLEIC ACID (DNA OR RNA); SEVERE ACUTE RESPIRATORY SYNDROME CORONAVIRUS 2 (SARS-COV-2) (CORONAVIRUS DISEASE [COVID-19]), AMPLIFIED PROBE TECHNIQUE, MAKING USE OF HIGH THROUGHPUT TECHNOLOGIES AS DESCRIBED BY CMS-2020-01-R: HCPCS

## 2020-07-09 ENCOUNTER — EXTERNAL HOME HEALTH (OUTPATIENT)
Dept: HOME HEALTH SERVICES | Facility: HOSPITAL | Age: 65
End: 2020-07-09
Payer: MEDICARE

## 2020-07-11 ENCOUNTER — OFFICE VISIT (OUTPATIENT)
Dept: NEPHROLOGY | Facility: CLINIC | Age: 65
End: 2020-07-11
Payer: MEDICARE

## 2020-07-11 ENCOUNTER — LAB VISIT (OUTPATIENT)
Dept: LAB | Facility: HOSPITAL | Age: 65
End: 2020-07-11
Attending: INTERNAL MEDICINE
Payer: MEDICARE

## 2020-07-11 VITALS
WEIGHT: 186 LBS | HEIGHT: 68 IN | HEART RATE: 70 BPM | SYSTOLIC BLOOD PRESSURE: 120 MMHG | BODY MASS INDEX: 28.19 KG/M2 | DIASTOLIC BLOOD PRESSURE: 74 MMHG

## 2020-07-11 DIAGNOSIS — R80.9 PROTEINURIA DUE TO TYPE 2 DIABETES MELLITUS: ICD-10-CM

## 2020-07-11 DIAGNOSIS — D64.9 ANEMIA, UNSPECIFIED TYPE: ICD-10-CM

## 2020-07-11 DIAGNOSIS — N18.4 CKD (CHRONIC KIDNEY DISEASE) STAGE 4, GFR 15-29 ML/MIN: ICD-10-CM

## 2020-07-11 DIAGNOSIS — E11.29 PROTEINURIA DUE TO TYPE 2 DIABETES MELLITUS: ICD-10-CM

## 2020-07-11 DIAGNOSIS — N17.9 AKI (ACUTE KIDNEY INJURY): ICD-10-CM

## 2020-07-11 DIAGNOSIS — N17.9 AKI (ACUTE KIDNEY INJURY): Primary | ICD-10-CM

## 2020-07-11 DIAGNOSIS — N25.81 HYPERPARATHYROIDISM, SECONDARY RENAL: ICD-10-CM

## 2020-07-11 LAB
ALBUMIN SERPL BCP-MCNC: 2.3 G/DL (ref 3.5–5.2)
ANION GAP SERPL CALC-SCNC: 9 MMOL/L (ref 8–16)
BUN SERPL-MCNC: 30 MG/DL (ref 8–23)
CALCIUM SERPL-MCNC: 9 MG/DL (ref 8.7–10.5)
CHLORIDE SERPL-SCNC: 100 MMOL/L (ref 95–110)
CO2 SERPL-SCNC: 29 MMOL/L (ref 23–29)
CREAT SERPL-MCNC: 2.3 MG/DL (ref 0.5–1.4)
EST. GFR  (AFRICAN AMERICAN): 33 ML/MIN/1.73 M^2
EST. GFR  (NON AFRICAN AMERICAN): 28 ML/MIN/1.73 M^2
GLUCOSE SERPL-MCNC: 134 MG/DL (ref 70–110)
PHOSPHATE SERPL-MCNC: 3.3 MG/DL (ref 2.7–4.5)
POTASSIUM SERPL-SCNC: 4.4 MMOL/L (ref 3.5–5.1)
SARS-COV-2 RNA RESP QL NAA+PROBE: DETECTED
SODIUM SERPL-SCNC: 138 MMOL/L (ref 136–145)

## 2020-07-11 PROCEDURE — 36415 COLL VENOUS BLD VENIPUNCTURE: CPT

## 2020-07-11 PROCEDURE — 99999 PR PBB SHADOW E&M-EST. PATIENT-LVL IV: CPT | Mod: PBBFAC,,, | Performed by: INTERNAL MEDICINE

## 2020-07-11 PROCEDURE — 99999 PR PBB SHADOW E&M-EST. PATIENT-LVL IV: ICD-10-PCS | Mod: PBBFAC,,, | Performed by: INTERNAL MEDICINE

## 2020-07-11 PROCEDURE — 99215 PR OFFICE/OUTPT VISIT, EST, LEVL V, 40-54 MIN: ICD-10-PCS | Mod: S$PBB,,, | Performed by: INTERNAL MEDICINE

## 2020-07-11 PROCEDURE — 99215 OFFICE O/P EST HI 40 MIN: CPT | Mod: S$PBB,,, | Performed by: INTERNAL MEDICINE

## 2020-07-11 PROCEDURE — 99214 OFFICE O/P EST MOD 30 MIN: CPT | Mod: PBBFAC | Performed by: INTERNAL MEDICINE

## 2020-07-11 PROCEDURE — 80069 RENAL FUNCTION PANEL: CPT

## 2020-07-11 NOTE — PROGRESS NOTES
Subjective:       Patient ID: Yan Choudhury is a 65 y.o. Black or  male who presents for follow-up evaluation of Acute Renal Failure and Chronic Kidney Disease    HPI     Pt is a 66 y/o male with a h/o of CKD stage 3, severe combined diastolic and systolic CHF (EF 15%), DM, HTN, and obesity who presented with weakness and SOB.  He is usually followed by Dr. Seth in the nephrology division.  His baseline creatinine has been between 2.4 and 2.6.    June 2020  Pt was admitted for COVID-19 infection. Renal was consulted because s Cr has worsened from baseline of about 2.5 to 4.1.  Patient was followed by Nephrology including myself in the hospital for acute kidney injury.  Patient never required dialysis.  His diuretics were adjusted from Bumex 2 mg twice a day and Zaroxolyn down and he improved his kidney function.  His diuretics were resumed before discharge.  His last creatinine was down to 2.6 on 06/26/2020.    July 2020 patient seen for follow-up after hospital discharge for acute kidney injury on chronic kidney disease with congestive heart failure. POCUS shows stable CHF ; wt 186 ( was 184 at d/c)     Review of Systems   Constitutional: Negative.  Negative for activity change, appetite change, chills, diaphoresis, fatigue and fever.   HENT: Negative.  Negative for congestion and trouble swallowing.    Eyes: Negative.    Respiratory: Negative.  Negative for cough, chest tightness, shortness of breath and wheezing.    Cardiovascular: Negative.  Negative for chest pain, palpitations and leg swelling.   Gastrointestinal: Negative.  Negative for abdominal distention, abdominal pain, nausea and vomiting.   Genitourinary: Negative.  Negative for decreased urine volume, difficulty urinating, dysuria, enuresis, flank pain, frequency, hematuria, penile swelling, scrotal swelling and urgency.   Musculoskeletal: Negative.  Negative for arthralgias, back pain, joint swelling and neck pain.   Skin: Negative  "for rash.   Neurological: Negative.  Negative for tremors, seizures and headaches.   Psychiatric/Behavioral: Negative.  Negative for confusion and sleep disturbance. The patient is not nervous/anxious.    All other systems reviewed and are negative.      Objective:   /74   Pulse 70   Ht 5' 8" (1.727 m)   Wt 84.4 kg (186 lb)   BMI 28.28 kg/m²      Physical Exam  Vitals signs and nursing note reviewed.   Constitutional:       General: He is not in acute distress.     Appearance: He is well-developed.   HENT:      Head: Normocephalic.   Eyes:      Pupils: Pupils are equal, round, and reactive to light.   Neck:      Musculoskeletal: Normal range of motion and neck supple.      Thyroid: No thyromegaly.      Vascular: No JVD.   Cardiovascular:      Rate and Rhythm: Normal rate and regular rhythm.      Chest Wall: PMI is not displaced.      Heart sounds: Normal heart sounds, S1 normal and S2 normal. No murmur. No friction rub. No gallop.    Pulmonary:      Effort: Pulmonary effort is normal. No respiratory distress.      Breath sounds: Normal breath sounds. No wheezing or rales.   Chest:      Chest wall: No tenderness.   Abdominal:      General: There is no distension.      Palpations: There is no mass.      Tenderness: There is no abdominal tenderness. There is no rebound.      Hernia: No hernia is present.   Musculoskeletal: Normal range of motion.         General: No tenderness.   Lymphadenopathy:      Cervical: No cervical adenopathy.   Skin:     General: Skin is warm and dry.      Findings: No erythema or rash.   Neurological:      Mental Status: He is alert and oriented to person, place, and time. He is not disoriented.      Cranial Nerves: No cranial nerve deficit.      Motor: No abnormal muscle tone.      Coordination: Coordination normal.      Deep Tendon Reflexes: Reflexes are normal and symmetric. Reflexes normal.   Psychiatric:         Behavior: Behavior normal.         Thought Content: Thought " content normal.         Judgment: Judgment normal.       Point of care ultrasound was performed to evaluate abnormal breathing pattern, volume status, cardiac status, evaluation for acute kidney injury and   shortness of breath.         Pulmonary ultrasound was evaluated in multiple sites   pneumothorax which was negative. B lines were positive more on right than on left .    No evidence of pneumothorax.  No Pleural effusion detected   no atelectasis or pneumonia.      Point of care ultrasound of the heart shows no evidence of pericardial effusion.  Normal   RV function.  LVEF 20% but no Left atrial enlargement ( stable CHF )     Point of care ultrasound of the inferior vena cava shows distended inferior vena cava patient was asked to sniff which shows no fluid overload ad and CVP of about 5-10     Lab Results   Component Value Date    CREATININE 2.3 (H) 07/11/2020    BUN 30 (H) 07/11/2020     07/11/2020    K 4.4 07/11/2020     07/11/2020    CO2 29 07/11/2020     Lab Results   Component Value Date    WBC 5.90 06/23/2020    HGB 11.7 (L) 06/23/2020    HCT 37.4 (L) 06/23/2020    MCV 77 (L) 06/23/2020     (L) 06/23/2020     Lab Results   Component Value Date    PTH 88.0 (H) 10/08/2019    CALCIUM 9.0 07/11/2020    PHOS 3.3 07/11/2020         Assessment:    )    1. BROOKE (acute kidney injury)    2. CKD (chronic kidney disease) stage 4, GFR 15-29 ml/min    3. Proteinuria due to type 2 diabetes mellitus    4. Hyperparathyroidism, secondary renal    5. Anemia, unspecified type        Plan:         Check renal panel--BROOKE is better and stable CKD -4    Continue with Bumex 2 mg twice a day     zaroxolyn keep for now 3x/week     POCUS utilized for volume status no changes in diuretics

## 2020-07-14 ENCOUNTER — OUTPATIENT CASE MANAGEMENT (OUTPATIENT)
Dept: ADMINISTRATIVE | Facility: OTHER | Age: 65
End: 2020-07-14

## 2020-07-14 NOTE — PROGRESS NOTES
07/14/20-Attempt COVID 19 follow up with  patient for outpatient case management. No answer. Left message requesting call back. RN OPCM 1'st follow up attempt.  07/17/20-Called and spoke to wife. Reviewed COVID 19 care plan with wife, verbalized understanding.     PLAN-  Did he receive senior resource book  Med Rec next follow up   Is participating in research    Outpatient Care Management  COVID-19 Patient Assessment    Patient: Yan Choudhury  MRN: [id number]  Date of Service: 07/14/2020  Completed by: Rajani Almeida RN  Program: COVID-19    Reason for Visit   Patient presents with    COVID-19 Concerns    OPCM RN First Follow-Up Attempt     07/14/20    Update Plan Of Care     07/17/20       Brief Summary: see above note    Assessment Documentation     COVID-19 Assessment    Nurse Assessment via Chart Review:  Nurse Assessment with Patient:  For Fever:  For Coughing:  For Difficulty Breathing:  Psycho/Social Assessment:         Problem List and History     Patient Active Problem List   Diagnosis    Hypertension associated with diabetes    ED (erectile dysfunction)    Ischemic cardiomyopathy    Pulmonary HTN    Hyperlipidemia associated with type 2 diabetes mellitus    Loss of eye - Right Eye    Abnormal ECG    LBBB (left bundle branch block)    Abnormal stress test    Chronic combined systolic and diastolic congestive heart failure    Cysts of eyelids    Primary open-angle glaucoma, moderate stage    Biventricular ICD (implantable cardioverter-defibrillator) in place    Type 2 diabetes mellitus with stage 3 chronic kidney disease, with long-term current use of insulin    Gastroesophageal reflux disease    Chronic gout without tophus    Vitamin D deficiency    Onychomycosis of multiple toenails with type 2 diabetes mellitus    Prosthetic eye globe    Screening for colon cancer    History of colostomy reversal    Nocturnal hypoxemia    Alteration in skin integrity    Nonrheumatic aortic valve  insufficiency    NSVT (nonsustained ventricular tachycardia)    Hyperparathyroidism    CAD, multiple vessel    Acute on chronic combined systolic and diastolic  heart failure with EF 15-20%    Class 2 severe obesity due to excess calories with serious comorbidity and body mass index (BMI) of 35.0 to 35.9 in adult    Chronic ischemic heart disease    Elevated LFTs    COVID-19 virus infection       Reviewed Active Problem List with patient and/or Caregiver. The following were identified as areas of need: COVID 19     Medical History:  Reviewed medical history with patient and/or caregiver    Social History:  Reviewed social history with patient and/or caregiver    Complex Care Plan    Care plan was discussed and completed today with input from patient and/or caregiver.    Patient Instructions     Instructions were provided via the [employer] patient resources and are available for the patient to view on the patient portal, if active.    Next steps: see note     Follow up in about 1 week (around 7/21/2020) for RN Follow up call.    Todays OPCM Self-Management Care Plan was developed with the patients/caregivers input and was based on identified barriers from todays assessment.  Goals were written today with the patient/caregiver and the patient has agreed to work towards these goals to improve his/her overall well-being. Patient verbalized understanding of the care plan, goals, and all of today's instructions. Encouraged patient/caregiver to communicate with his/her physician and health care team about health conditions and the treatment plan.  Provided my contact information today and encouraged patient/caregiver to call me with any questions as needed.

## 2020-07-15 ENCOUNTER — OFFICE VISIT (OUTPATIENT)
Dept: INTERNAL MEDICINE | Facility: CLINIC | Age: 65
End: 2020-07-15
Payer: MEDICARE

## 2020-07-15 DIAGNOSIS — G47.34 NOCTURNAL HYPOXEMIA: ICD-10-CM

## 2020-07-15 DIAGNOSIS — N18.30 TYPE 2 DIABETES MELLITUS WITH STAGE 3 CHRONIC KIDNEY DISEASE, WITH LONG-TERM CURRENT USE OF INSULIN: ICD-10-CM

## 2020-07-15 DIAGNOSIS — N18.30 STAGE 3 CHRONIC KIDNEY DISEASE: ICD-10-CM

## 2020-07-15 DIAGNOSIS — E11.22 TYPE 2 DIABETES MELLITUS WITH STAGE 3 CHRONIC KIDNEY DISEASE, WITH LONG-TERM CURRENT USE OF INSULIN: ICD-10-CM

## 2020-07-15 DIAGNOSIS — E11.59 HYPERTENSION ASSOCIATED WITH DIABETES: ICD-10-CM

## 2020-07-15 DIAGNOSIS — Z95.810 BIVENTRICULAR ICD (IMPLANTABLE CARDIOVERTER-DEFIBRILLATOR) IN PLACE: Chronic | ICD-10-CM

## 2020-07-15 DIAGNOSIS — U07.1 COVID-19 VIRUS INFECTION: Primary | ICD-10-CM

## 2020-07-15 DIAGNOSIS — Z79.4 TYPE 2 DIABETES MELLITUS WITH STAGE 3 CHRONIC KIDNEY DISEASE, WITH LONG-TERM CURRENT USE OF INSULIN: ICD-10-CM

## 2020-07-15 DIAGNOSIS — I50.42 CHRONIC COMBINED SYSTOLIC AND DIASTOLIC CONGESTIVE HEART FAILURE: ICD-10-CM

## 2020-07-15 DIAGNOSIS — I15.2 HYPERTENSION ASSOCIATED WITH DIABETES: ICD-10-CM

## 2020-07-15 PROCEDURE — 99442 PR PHYSICIAN TELEPHONE EVALUATION 11-20 MIN: ICD-10-PCS | Mod: 95,,, | Performed by: FAMILY MEDICINE

## 2020-07-15 PROCEDURE — 99442 PR PHYSICIAN TELEPHONE EVALUATION 11-20 MIN: CPT | Mod: 95,,, | Performed by: FAMILY MEDICINE

## 2020-07-15 NOTE — PROGRESS NOTES
Established Patient - Audio Only Telehealth Visit     The patient location is: Home  The chief complaint leading to consultation is:  COVID positive  Visit type: Virtual visit with audio only (telephone)  Total time spent with patient:  15 min       The reason for the audio only service rather than synchronous audio and video virtual visit was related to technical difficulties or patient preference/necessity.     Each patient to whom I provide medical services by telemedicine is:  (1) informed of the relationship between the physician and patient and the respective role of any other health care provider with respect to management of the patient; and (2) notified that they may decline to receive medical services by telemedicine and may withdraw from such care at any time. Patient verbally consented to receive this service via voice-only telephone call.       Subjective:       Patient ID: Yan Choudhury is a 65 y.o. male.    Chief Complaint: No chief complaint on file.    65-year-old  male patient with Patient Active Problem List:     Hypertension associated with diabetes     ED (erectile dysfunction)     Ischemic cardiomyopathy     Pulmonary HTN     Hyperlipidemia associated with type 2 diabetes mellitus     Loss of eye - Right Eye     Abnormal ECG     LBBB (left bundle branch block)     Abnormal stress test     Chronic combined systolic and diastolic congestive heart failure     Cysts of eyelids     Primary open-angle glaucoma, moderate stage     Biventricular ICD (implantable cardioverter-defibrillator) in place     Type 2 diabetes mellitus with stage 3 chronic kidney disease, with long-term current use of insulin     Gastroesophageal reflux disease     Chronic gout without tophus     Vitamin D deficiency     Onychomycosis of multiple toenails with type 2 diabetes mellitus     Prosthetic eye globe     Screening for colon cancer     History of colostomy reversal     Nocturnal hypoxemia     Alteration  in skin integrity     Nonrheumatic aortic valve insufficiency     NSVT (nonsustained ventricular tachycardia)     Hyperparathyroidism     CAD, multiple vessel     Acute on chronic combined systolic and diastolic  heart failure with EF 15-20%     Class 2 severe obesity due to excess calories with serious comorbidity and body mass index (BMI) of 35.0 to 35.9 in adult     Chronic ischemic heart disease     Elevated LFTs     COVID-19 virus infection    Here for positive COVID and recent hospital admission.     Reviewed discharge summary  Admission Date: 6/17/2020  Hospital Length of Stay: 9 days  Discharge Date and Time:  06/26/2020 12:53 PM        HPI:   Pt is a 64 yo male with PMHx of combined CHF with EF 15 %, CKD III, DM II and ischemic cardiomyopathy who presents to the ED due to weakness. Wife provides history as patient is a difficult historian. Wife explains pt has had a decreased appetite with increasing weakness onset for approx 6 days. Wife says that Cardiology mentioned possible dehydration. Wife says that pt has not been eating or drinking regularly with increased weakness, walking slow and increased fatigue. While in the ED, nurse states that pt was tachypnic with respiratory rate 26.   On arrival: Temp 97, pulse 74, resp 22, B/P 99/56 and pulse ox 95 on RA.   CXR - Slight increase in the the vascular markings suggested, a little greater at the right base.  Mild congestion?  No overt failure.    BNP = 1874 (last admission 2300)  Labs find WBC 3.13, Na 131, serum creatinine 4.1 (baseline 2.7), glucose 119  PT IS COVID +      Patient reports that he has been feeling better, and has been checking his oxygen levels off and on lately and has been running in the range of 97%, currently on 2 L of oxygen.   Denies any extreme shortness, of breath , fever with chills, productive cough.   Appetite has been stable  Has been drinking adequate fluids       Review of Systems   Constitutional: Negative for appetite  change, chills, fatigue and fever.   Eyes: Negative for visual disturbance.   Respiratory: Negative for cough, shortness of breath and wheezing.    Cardiovascular: Negative for chest pain, palpitations and leg swelling.   Gastrointestinal: Negative for abdominal pain, nausea and vomiting.   Musculoskeletal: Negative for myalgias.   Skin: Negative for rash.   Neurological: Negative for headaches.   Psychiatric/Behavioral: Negative for sleep disturbance.         There were no vitals taken for this visit.  Objective:      Physical Exam  Pulmonary:      Effort: No respiratory distress.   Neurological:      Mental Status: He is alert and oriented to person, place, and time.   Psychiatric:         Mood and Affect: Mood normal.         No visits with results within 1 Day(s) from this visit.   Latest known visit with results is:   Lab Visit on 07/11/2020   Component Date Value Ref Range Status    Glucose 07/11/2020 134* 70 - 110 mg/dL Final    Sodium 07/11/2020 138  136 - 145 mmol/L Final    Potassium 07/11/2020 4.4  3.5 - 5.1 mmol/L Final    Chloride 07/11/2020 100  95 - 110 mmol/L Final    CO2 07/11/2020 29  23 - 29 mmol/L Final    BUN, Bld 07/11/2020 30* 8 - 23 mg/dL Final    Calcium 07/11/2020 9.0  8.7 - 10.5 mg/dL Final    Creatinine 07/11/2020 2.3* 0.5 - 1.4 mg/dL Final    Albumin 07/11/2020 2.3* 3.5 - 5.2 g/dL Final    Phosphorus 07/11/2020 3.3  2.7 - 4.5 mg/dL Final    eGFR if  07/11/2020 33* >60 mL/min/1.73 m^2 Final    eGFR if non African American 07/11/2020 28* >60 mL/min/1.73 m^2 Final    Comment: Calculation used to obtain the estimated glomerular filtration  rate (eGFR) is the CKD-EPI equation.       Anion Gap 07/11/2020 9  8 - 16 mmol/L Final       Assessment/Plan:   1. COVID-19 virus infection  - COVID-19 Surveillance Program  - pulse oximeter (PULSE OXIMETER) device; by Apply Externally route 2 (two) times a day. Use twice daily at 8 AM and 3 PM and record the value in [facility]  as directed.  Dispense: 1 each; Refill: 0  2. Nocturnal hypoxemia  3. Chronic combined systolic and diastolic congestive heart failure  5. Biventricular ICD (implantable cardioverter-defibrillator) in place  Hospital discharge follow-up    Patient reports that he is clinically doing well  Will have patient enroll in COVID 19 surveillance program for close monitoring  Continue Bumex 3 times a day and interest to 1 follow-up with Cardiology, secondary to acute on chronic congestive heart failure  Noted COVID positive on 07/08/2020  Patient to continue strict isolation precautions    Reviewed recent labs showing minimal improvement in kidney functions.     4. Hypertension associated with diabetes  6. Stage 3 chronic kidney disease    7. Type 2 diabetes mellitus with stage 3 chronic kidney disease, with long-term current use of insulin                             This service was not originating from a related E/M service provided within the previous 7 days nor will  to an E/M service or procedure within the next 24 hours or my soonest available appointment.  Prevailing standard of care was able to be met in this audio-only visit.

## 2020-07-16 ENCOUNTER — NURSE TRIAGE (OUTPATIENT)
Dept: ADMINISTRATIVE | Facility: CLINIC | Age: 65
End: 2020-07-16

## 2020-07-16 NOTE — TELEPHONE ENCOUNTER
Called patient to review COVID-19 Surveillance Program enrollment process.    I spoke with Mr. Choudhury (pt) and Ms Zeeshan who verbalized understanding and would like to participate in the program.   Said they have program on phone and already has pulse ox, reviewed what numbers are.  Gave them tech number `[phone] for my chart, and [facility] RN number [phone]      Smartphone: yes     [facility]:   yes    Program consent:  Will do     Pulse oximeter status: has it     Verified emergency contacts:  Jessica, phone numbers on chart verified    Program Overview: Reviewed , no response process, and importance of correct emergency contacts in event that well-being check is warranted. Encouraged patient to call [phone] 24/7 to speak with an OnCall nurse, if needed.    Patient had no further questions.    Reason for Disposition   Information only question and nurse able to answer    Protocols used: NO PROTOCOL AVAILABLE - INFORMATION ONLY-A-[place]

## 2020-07-19 ENCOUNTER — NURSE TRIAGE (OUTPATIENT)
Dept: ADMINISTRATIVE | Facility: CLINIC | Age: 65
End: 2020-07-19

## 2020-07-19 NOTE — TELEPHONE ENCOUNTER
Reason for Disposition   Caller has cancelled the call before the first contact     Patient initially escalated due to no response, however patient entered vitals prior to phone call. Vital signs and symptoms did not trigger a second escalation; therefore, no follow up call is needed at this time.    Additional Information   Negative: Caller is angry or rude (e.g., hangs up, verbally abusive, yelling)   Negative: Caller hangs up   Negative: Caller has already spoken with the PCP and has no further questions.   Negative: Caller has already spoken with another triager and has no further questions.   Negative: Caller has already spoken with another triager or PCP AND has further questions AND triager able to answer questions.    Protocols used: NO CONTACT OR DUPLICATE CONTACT CALL-A-

## 2020-07-19 NOTE — TELEPHONE ENCOUNTER
SpO2 98  HR: 69  T= 96.6    Patient initially escalated due to no response, however patient entered vitals prior to phone call. Vital signs and symptoms did not trigger a second escalation; therefore, no follow up call is needed at this time.

## 2020-07-21 ENCOUNTER — NURSE TRIAGE (OUTPATIENT)
Dept: ADMINISTRATIVE | Facility: CLINIC | Age: 65
End: 2020-07-21

## 2020-07-21 NOTE — TELEPHONE ENCOUNTER
COVID SURVEILLANCE     PULSE O2  96&   HEART RATE  82  SOB AT REST  0  SOB ON EXERTION   0  COUGH intermittient     Covid Surveillance escalated due pulse ox.   Restested patient he is at 97%. Denies sob, at rest.  Said I was just putting on my clothes.  Gave him covid isolation protocol.  Will continue to monitor him on next push notificaiton.  Patient has the [phone]  Reason for Disposition   [1] COVID-19 diagnosed by positive lab test AND [2] mild symptoms (e.g., cough, fever, others) AND [3] no complications or SOB    Additional Information   Negative: SEVERE difficulty breathing (e.g., struggling for each breath, speaks in single words)   Negative: Difficult to awaken or acting confused (e.g., disoriented, slurred speech)   Negative: Bluish (or gray) lips or face now   Negative: Shock suspected (e.g., cold/pale/clammy skin, too weak to stand, low BP, rapid pulse)   Negative: Sounds like a life-threatening emergency to the triager   Negative: [1] COVID-19 exposure AND [2] NO symptoms   Negative: COVID-19 and Breastfeeding, questions about   Negative: [1] Adult with possible COVID-19 symptoms AND [2] triager concerned about severity of symptoms or other causes   Negative: SEVERE or constant chest pain or pressure (Exception: mild central chest pain, present only when coughing)   Negative: MODERATE difficulty breathing (e.g., speaks in phrases, SOB even at rest, pulse 100-120)   Negative: MILD difficulty breathing (e.g., minimal/no SOB at rest, SOB with walking, pulse <100)   Negative: Chest pain   Negative: Patient sounds very sick or weak to the triager   Negative: Fever > 103 F (39.4 C)   Negative: [1] Fever > 101 F (38.3 C) AND [2] age > 60   Negative: [1] Fever > 100.0 F (37.8 C) AND [2] bedridden (e.g., nursing home patient, CVA, chronic illness, recovering from surgery)   Negative: HIGH RISK patient (e.g., age > 64 years, diabetes, heart or lung disease, weak immune system)    Negative: [1] COVID-19 infection suspected by caller or triager AND [2] mild symptoms (cough, fever, or others) AND [3] no complications or SOB   Negative: Fever present > 3 days (72 hours)   Negative: [1] Fever returns after gone for over 24 hours AND [2] symptoms worse or not improved   Negative: [1] Continuous (nonstop) coughing interferes with work or school AND [2] no improvement using cough treatment per protocol   Negative: Cough present > 3 weeks    Protocols used: CORONAVIRUS (COVID-19) DIAGNOSED OR [id number]

## 2020-07-26 ENCOUNTER — NURSE TRIAGE (OUTPATIENT)
Dept: ADMINISTRATIVE | Facility: CLINIC | Age: 65
End: 2020-07-26

## 2020-07-26 NOTE — TELEPHONE ENCOUNTER
Patient initially escalated due to no response, however patient entered vitals prior to phone call. Vital signs and symptoms did not trigger a second escalation; therefore, no follow up call is needed at this time.    Reason for Disposition   Caller has cancelled the call before the first contact     Patient initially escalated due to no response, however patient entered vitals prior to phone call. Vital signs and symptoms did not trigger a second escalation; therefore, no follow up call is needed at this time.    Additional Information   Negative: Caller is angry or rude (e.g., hangs up, verbally abusive, yelling)   Negative: Caller hangs up   Negative: Caller has already spoken with the PCP and has no further questions.   Negative: Caller has already spoken with another triager and has no further questions.   Negative: Caller has already spoken with another triager or PCP AND has further questions AND triager able to answer questions.    Protocols used: NO CONTACT OR DUPLICATE CONTACT CALL-A-

## 2020-07-28 ENCOUNTER — OUTPATIENT CASE MANAGEMENT (OUTPATIENT)
Dept: ADMINISTRATIVE | Facility: OTHER | Age: 65
End: 2020-07-28

## 2020-07-28 NOTE — PROGRESS NOTES
07/28/20-Called and spoke to wife with patient in the background. He has not received senior resource book, mailing another one. Wife gives patient all his medications. Patient is enrolled in the COVID 19 surveillance program. Patient has his appetite back. His weakness is better. Denies chest pain, fever, shortness of breath or diarrhea. Reviewed COVID 19 care plan with patient, verbalized understanding.     Outpatient Care Management  COVID-19 Patient Assessment    Patient: Yan Choudhury  MRN: [id number]  Date of Service: 07/28/2020  Completed by: Rajani Almeida RN  Program: COVID-19    Reason for Visit   Patient presents with    COVID-19 Concerns    Update Plan Of Care       Brief Summary: see above note     Assessment Documentation     COVID-19 Assessment    Nurse Assessment via Chart Review:  Nurse Assessment with Patient:  For Fever:  For Coughing:  For Difficulty Breathing:  Psycho/Social Assessment:         Problem List and History     Patient Active Problem List   Diagnosis    Hypertension associated with diabetes    ED (erectile dysfunction)    Ischemic cardiomyopathy    Pulmonary HTN    Hyperlipidemia associated with type 2 diabetes mellitus    Loss of eye - Right Eye    Abnormal ECG    LBBB (left bundle branch block)    Abnormal stress test    Chronic combined systolic and diastolic congestive heart failure    Cysts of eyelids    Primary open-angle glaucoma, moderate stage    Biventricular ICD (implantable cardioverter-defibrillator) in place    Type 2 diabetes mellitus with stage 3 chronic kidney disease, with long-term current use of insulin    Gastroesophageal reflux disease    Chronic gout without tophus    Vitamin D deficiency    Onychomycosis of multiple toenails with type 2 diabetes mellitus    Prosthetic eye globe    Screening for colon cancer    History of colostomy reversal    Nocturnal hypoxemia    Alteration in skin integrity    Nonrheumatic aortic valve insufficiency     NSVT (nonsustained ventricular tachycardia)    Hyperparathyroidism    CAD, multiple vessel    Acute on chronic combined systolic and diastolic  heart failure with EF 15-20%    Class 2 severe obesity due to excess calories with serious comorbidity and body mass index (BMI) of 35.0 to 35.9 in adult    Chronic ischemic heart disease    Elevated LFTs    COVID-19 virus infection       Reviewed Active Problem List with patient and/or Caregiver. The following were identified as areas of need: COVID 19     Medical History:  Reviewed medical history with patient and/or caregiver    Social History:  Reviewed social history with patient and/or caregiver    Complex Care Plan    Care plan was discussed and completed today with input from patient and/or caregiver.    Patient Instructions     Instructions were provided via the [facility] patient resources and are available for the patient to view on the patient portal, if active.    Next steps: follow up     Follow up in about 1 week (around 8/4/2020) for RN Follow up call.    Todays OPCM Self-Management Care Plan was developed with the patients/caregivers input and was based on identified barriers from todays assessment.  Goals were written today with the patient/caregiver and the patient has agreed to work towards these goals to improve his/her overall well-being. Patient verbalized understanding of the care plan, goals, and all of today's instructions. Encouraged patient/caregiver to communicate with his/her physician and health care team about health conditions and the treatment plan.  Provided my contact information today and encouraged patient/caregiver to call me with any questions as needed.

## 2020-07-29 ENCOUNTER — NURSE TRIAGE (OUTPATIENT)
Dept: ADMINISTRATIVE | Facility: CLINIC | Age: 65
End: 2020-07-29

## 2020-07-29 NOTE — TELEPHONE ENCOUNTER
"COVID SURVEILLANCE  HR 72  SpO2 97  T 98.8    Pt escalated due to no response to 3 pm push notification. Contacted pt and spoke with wife. Got VS over the phone but wife said she will enter them in the neena. All VS stable as listed above. Wife denies cough, fever symptoms, and rates SOB at rest a "0" and with activity a "1" which does not trigger escalation. Continue home care. Pt has OOC number if symptoms worsen. Will continue to monitor at next push notification.      Reason for Disposition   Health Information question, no triage required and triager able to answer question    Protocols used: INFORMATION ONLY CALL - NO TRIAGE-A-      "

## 2020-07-30 ENCOUNTER — TELEPHONE (OUTPATIENT)
Dept: INTERNAL MEDICINE | Facility: CLINIC | Age: 65
End: 2020-07-30

## 2020-07-30 NOTE — TELEPHONE ENCOUNTER
S/w pt wife and informed that letter was faxed over to [employer] as requsted. Pt wife voiced understanding./Kmw

## 2020-07-30 NOTE — TELEPHONE ENCOUNTER
----- Message from Sandra Estevez MD sent at 7/30/2020  4:19 PM CDT -----  Regarding: RE: Letter  Contact: Pt spouse  Letter has been  printed,  please fax it to [employer] as requested    Dr. Estevez  ----- Message -----  From: Burak Little MA  Sent: 7/30/2020   3:30 PM CDT  To: Sandra Estevez MD  Subject: FW: Letter                                       Please see message below. Please Advise.  ----- Message -----  From: Brigette Chamberlain  Sent: 7/30/2020   2:41 PM CDT  To: [facility] Staff  Subject: Letter                                           Caller called to give Dr. Estevez a fax for [employer] [phone]. Caller stated that she needed a letter on a letter head sent over stating that the pt is on Oxygen. Caller can be reached at  [phone].   The follow information is needed:  The Address  The Pt Name on File  Account Number is: [id number]

## 2020-08-04 ENCOUNTER — OUTPATIENT CASE MANAGEMENT (OUTPATIENT)
Dept: ADMINISTRATIVE | Facility: OTHER | Age: 65
End: 2020-08-04

## 2020-08-11 ENCOUNTER — OUTPATIENT CASE MANAGEMENT (OUTPATIENT)
Dept: ADMINISTRATIVE | Facility: OTHER | Age: 65
End: 2020-08-11

## 2020-08-11 NOTE — PROGRESS NOTES
08/11/20-Attempt COVID 19 follow up with  patient for outpatient case management. No answer. Left message requesting call back. RN OPCM 1'st follow up attempt.   08/18/20-Reviewed COVID 19 care plan with wife. Patient with no complaints. Reviewed two upcoming appointments with patient on 08/19/20. Patient/wife has RN contact information. Will close case and dis-enroll patient from OPCM.

## 2020-08-14 RX ORDER — CARVEDILOL 3.12 MG/1
3.12 TABLET ORAL 2 TIMES DAILY WITH MEALS
Qty: 60 TABLET | Refills: 12 | Status: SHIPPED | OUTPATIENT
Start: 2020-08-14 | End: 2021-03-15 | Stop reason: CLARIF

## 2020-08-17 RX ORDER — PANTOPRAZOLE SODIUM 40 MG/1
TABLET, DELAYED RELEASE ORAL
Qty: 90 TABLET | Refills: 1 | Status: SHIPPED | OUTPATIENT
Start: 2020-08-17 | End: 2021-02-09

## 2020-08-17 NOTE — TELEPHONE ENCOUNTER
S/w pt wife and was able to schedule pt for an audio visit on 8/19/2020. Pt wife voiced understanding./Juanw

## 2020-08-17 NOTE — TELEPHONE ENCOUNTER
----- Message from Jacquie Reddy sent at 8/17/2020 11:53 AM CDT -----  Regarding: audio visit  .Type:  Needs Medical Advice    Who Called:  spouse  Symptoms (please be specific):  follow up   How long has patient had these symptoms:   n/a  Pharmacy name and phone #:  n/a  Would the patient rather a call back or a response via [facility]?  Call back  Best Call Back Number:  [phone]    Additional Information:  request audio visit

## 2020-08-18 ENCOUNTER — PATIENT OUTREACH (OUTPATIENT)
Dept: ADMINISTRATIVE | Facility: OTHER | Age: 65
End: 2020-08-18

## 2020-08-19 ENCOUNTER — OFFICE VISIT (OUTPATIENT)
Dept: NEPHROLOGY | Facility: CLINIC | Age: 65
End: 2020-08-19
Payer: MEDICARE

## 2020-08-19 VITALS
DIASTOLIC BLOOD PRESSURE: 60 MMHG | WEIGHT: 190.94 LBS | SYSTOLIC BLOOD PRESSURE: 100 MMHG | HEART RATE: 68 BPM | HEIGHT: 68 IN | BODY MASS INDEX: 28.94 KG/M2

## 2020-08-19 DIAGNOSIS — N25.81 HYPERPARATHYROIDISM, SECONDARY RENAL: ICD-10-CM

## 2020-08-19 DIAGNOSIS — R06.02 SOB (SHORTNESS OF BREATH): ICD-10-CM

## 2020-08-19 DIAGNOSIS — R80.9 PROTEINURIA DUE TO TYPE 2 DIABETES MELLITUS: ICD-10-CM

## 2020-08-19 DIAGNOSIS — N18.30 CKD (CHRONIC KIDNEY DISEASE) STAGE 3, GFR 30-59 ML/MIN: Primary | ICD-10-CM

## 2020-08-19 DIAGNOSIS — E11.29 PROTEINURIA DUE TO TYPE 2 DIABETES MELLITUS: ICD-10-CM

## 2020-08-19 DIAGNOSIS — R06.02 SOB (SHORTNESS OF BREATH): Primary | ICD-10-CM

## 2020-08-19 PROCEDURE — 99214 PR OFFICE/OUTPT VISIT, EST, LEVL IV, 30-39 MIN: ICD-10-PCS | Mod: S$PBB,,, | Performed by: INTERNAL MEDICINE

## 2020-08-19 PROCEDURE — 99999 PR PBB SHADOW E&M-EST. PATIENT-LVL V: CPT | Mod: PBBFAC,,, | Performed by: INTERNAL MEDICINE

## 2020-08-19 PROCEDURE — 99214 OFFICE O/P EST MOD 30 MIN: CPT | Mod: S$PBB,,, | Performed by: INTERNAL MEDICINE

## 2020-08-19 PROCEDURE — 99215 OFFICE O/P EST HI 40 MIN: CPT | Mod: PBBFAC | Performed by: INTERNAL MEDICINE

## 2020-08-19 PROCEDURE — 99999 PR PBB SHADOW E&M-EST. PATIENT-LVL V: ICD-10-PCS | Mod: PBBFAC,,, | Performed by: INTERNAL MEDICINE

## 2020-08-19 NOTE — PROGRESS NOTES
PROGRESS NOTE FOR ESTABLISHED PATIENT    PHYSICIAN REQUESTING THE CONSULT: Dr. Sandra Estevez    REASON FOR VISIT: Renal insufficiency    65 y.o. male with history of CKD 3/4, HTN, CHF, anemia, DM2, CAD, pulmonary hypertension presents to the renal clinic for evaluation of renal insufficiency.       Patient had recent hospital stay at [facility] in 11/2018 when he presented with leak from colostomy reversal and BROOKE. Peak creatinine was 8.2 on 11/15/18. Renal function has recovered with IV fluids and creatinine has now declined to 2.7 (7/15/19).    July 22, 2019:  Patient today reports mild LE edema today.  No other issues.      December 10, 2019:  Patient feeling OK today. Renal function has slightly improved with creatinine at 2.4 (down from 2.7).     May 15, 2020:  Patient reports worsening LE edema and mild SOB.     August 19, 2020:  Patient was diagnosed with SARS-CoV-2 on 7/8/20. Today, he reports mild SOB.  Last creatinine was 2.3 (7/11/20).         Past Medical History:   Diagnosis Date    Arthritis     CAD (coronary artery disease)     Cataract     Chronic combined systolic and diastolic congestive heart failure 3/24/2015    CKD (chronic kidney disease), stage III     Diabetes mellitus type II      AM    Diabetic retinopathy     anti Veg F injections for macular edema    Diverticulitis of colon     ED (erectile dysfunction)     Encounter for blood transfusion     Glaucoma     HTN (hypertension)     Hyperlipidemia     Ischemic cardiomyopathy     Obesity     JAHAIRA on CPAP     Pacemaker     Pulmonary HTN        Past Surgical History:   Procedure Laterality Date    CARDIAC CATHETERIZATION  2009    CHOLECYSTECTOMY      COLON SURGERY      Sigmoid resection    COLONOSCOPY N/A 10/11/2018    Procedure: COLONOSCOPY;  Surgeon: Quinn Aragon MD;  Location: [facility];  Service: General;  Laterality: N/A;    EYE SURGERY      FLEXIBLE SIGMOIDOSCOPY N/A 1/18/2019    Procedure: SIGMOIDOSCOPY,  FLEXIBLE;  Surgeon: Quinn Aragon MD;  Location: [facility] ENDO;  Service: General;  Laterality: N/A;    ILEOSTOMY N/A 10/30/2018    Procedure: CREATION, ILEOSTOMY;  Surgeon: Kevin Lindsay MD;  Location: [facility] OR;  Service: General;  Laterality: N/A;    Ileostomy reversal  01/2019    KNEE SURGERY      MOBILIZATION OF SPLENIC FLEXURE N/A 10/30/2018    Procedure: MOBILIZATION, SPLENIC FLEXURE;  Surgeon: Kevin Lindsay MD;  Location: [facility] OR;  Service: General;  Laterality: N/A;    REVISION COLOSTOMY N/A 10/30/2018    Procedure: REVISION OR CLOSURE, COLOSTOMY;  Surgeon: Kevin Lindsay MD;  Location: [facility] OR;  Service: General;  Laterality: N/A;  Parastomal Hernia Repair    RIGHT HEMICOLECTOMY Right 10/30/2018    Procedure: HEMICOLECTOMY, RIGHT;  Surgeon: Kevin Lindsay MD;  Location: [facility] OR;  Service: General;  Laterality: Right;       Review of patient's allergies indicates:  No Known Allergies    Current Outpatient Medications   Medication Sig Dispense Refill    albuterol (PROVENTIL/VENTOLIN HFA) 90 mcg/actuation inhaler Inhale 2 puffs into the lungs every 6 (six) hours as needed for Wheezing. Rescue 18 g 0    allopurinoL (ZYLOPRIM) 100 MG tablet TAKE 1 TABLET BY MOUTH EVERY DAY 90 tablet 0    aspirin (ECOTRIN) 81 MG EC tablet Take 81 mg by mouth every morning.       BD INSULIN SYRINGE ULTRA-FINE 0.5 mL 31 gauge x 5/16 Syrg USE AS DIRECTED TO INJECT INSULIN TWO TIMES DAILY 60 each 11    bimatoprost (LUMIGAN) 0.03 % ophthalmic drops Place 1 drop into the left eye every evening. 2.5 mL 11    blood sugar diagnostic Strp 1 strip by Misc.(Non-Drug; Combo Route) route 4 (four) times daily. Telcare 120 strip 11    brimonidine-timolol (COMBIGAN) 0.2-0.5 % Drop Place 1 drop into both eyes every 12 (twelve) hours. 10 mL 6    bumetanide (BUMEX) 2 MG tablet TAKE 1 TABLET (2 MG TOTAL) BY MOUTH three times a day (Patient taking differently: Take 2 mg by mouth 2 (two) times daily. TAKE 1 TABLET (2 MG  "TOTAL) BY MOUTH three times a day) 90 tablet 11    carvediloL (COREG) 3.125 MG tablet Take 1 tablet (3.125 mg total) by mouth 2 (two) times daily with meals. 60 tablet 12    cholecalciferol, vitamin D3, (VITAMIN D3) 1,000 unit capsule Take 1,000 Units by mouth once daily.      dextromethorphan-guaifenesin  mg (MUCINEX DM)  mg per 12 hr tablet Take 1 tablet by mouth 2 (two) times daily as needed.      dorzolamide-timolol 2-0.5% (COSOPT) 22.3-6.8 mg/mL ophthalmic solution Place 1 drop into both eyes 2 (two) times daily. 10 mL 2    ENTRESTO 24-26 mg per tablet TAKE 1 TABLET BY MOUTH TWICE A DAY 60 tablet 12    hydrOXYzine HCL (ATARAX) 10 MG Tab Take 1 tablet (10 mg total) by mouth daily as needed. 30 tablet 0    insulin glargine (LANTUS U-100 INSULIN) 100 unit/mL injection INJECT 20 UNITS ONCE DAILY AS NEEDED WHEN BS< 150 30 mL 11    IRON/VITAMIN B COMPLEX (GERITOL ORAL) Take by mouth.      ketoconazole (NIZORAL) 2 % cream AAA bid prn rash of groin. For Flares. 60 g 3    lancets [facility] For tel care 120 each 11    latanoprost (XALATAN) 0.005 % ophthalmic solution Place 1 drop into both eyes once daily. 2.5 mL 4    multivit-min/folic/vit K/lycop (MEN'S 50 PLUS MULTIVITAMIN ORAL) Take by mouth.      pantoprazole (PROTONIX) 40 MG tablet TAKE 1 TABLET BY MOUTH EVERY DAY FOR ACID REFLUX 90 tablet 1    pen needle, diabetic (BD ULTRA-FINE JOSLYN PEN NEEDLE) 32 gauge x 5/32" Ndle 1 each by Misc.(Non-Drug; Combo Route) route 4 (four) times daily. 100 each 11    pulse oximeter (PULSE OXIMETER) device by Apply Externally route 2 (two) times a day. Use twice daily at 8 AM and 3 PM and record the value in [facility] as directed. 1 each 0    simvastatin (ZOCOR) 10 MG tablet Take 1 tablet (10 mg total) by mouth every evening. 90 tablet 4    sodium bicarbonate 650 MG tablet Take 1 tablet (650 mg total) by mouth 3 (three) times daily. (Patient taking differently: Take 650 mg by mouth every Mon, Wed, Fri. ) 90 " tablet 11     No current facility-administered medications for this visit.        Family History   Problem Relation Age of Onset    Cancer Mother     Heart disease Father     Stroke Father     No Known Problems Sister     No Known Problems Brother     No Known Problems Maternal Aunt     No Known Problems Maternal Uncle     No Known Problems Paternal Aunt     No Known Problems Paternal Uncle     No Known Problems Maternal Grandmother     No Known Problems Maternal Grandfather     No Known Problems Paternal Grandmother     No Known Problems Paternal Grandfather     Amblyopia Neg Hx     Blindness Neg Hx     Cataracts Neg Hx     Diabetes Neg Hx     Glaucoma Neg Hx     Hypertension Neg Hx     Macular degeneration Neg Hx     Retinal detachment Neg Hx     Strabismus Neg Hx     Thyroid disease Neg Hx        Social History     Socioeconomic History    Marital status:      Spouse name: Not on file    Number of children: 2    Years of education: Not on file    Highest education level: Not on file   Occupational History    Occupation: [employer]     Employer: [employer]   Social Needs    Financial resource strain: Not on file    Food insecurity     Worry: Not on file     Inability: Not on file    Transportation needs     Medical: Not on file     Non-medical: Not on file   Tobacco Use    Smoking status: Never Smoker    Smokeless tobacco: Never Used   Substance and Sexual Activity    Alcohol use: No     Alcohol/week: 0.0 standard drinks     Frequency: Never    Drug use: No    Sexual activity: Not Currently   Lifestyle    Physical activity     Days per week: Not on file     Minutes per session: Not on file    Stress: Not on file   Relationships    Social connections     Talks on phone: Not on file     Gets together: Not on file     Attends Nondenominational service: Not on file     Active member of club or organization: Not on file     Attends meetings of clubs or organizations:  "Not on file     Relationship status: Not on file   Other Topics Concern    Not on file   Social History Narrative    . Lives with spouse. Has 2 children. 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       Review of Systems:  1. GENERAL: patient denies any fever, weight changes, generalized weakness, dizziness.  2. HEENT: patient denies headaches, visual disturbances, swallowing problems, sinus pain, nasal congestion.  3. CARDIOVASCULAR: patient denies chest pain, palpitations.  4. PULMONARY: patient reports SOB, no coughing, hemoptysis, wheezing.  5. GASTROINTESTINAL: patient denies abdominal pain, nausea, vomiting, diarrhea.  6. GENITOURINARY: patient denies dysuria, hematuria, hesitancy, frequency.  7. EXTREMITIES: patient reports LE edema, no LE cramping.  8. DERMATOLOGY: patient denies rashes, ulcers.  9. NEURO: patient denies tremors, extremity weakness, extremity numbness/tingling.  10. MUSCULOSKELETAL: patient denies joint pain, joint swelling.  11. HEMATOLOGY: patient denies rectal or gum bleeding.  12: PSYCH: patient denies anxiety, depression.      PHYSICAL EXAM:  /60   Pulse 68   Ht 5' 8" (1.727 m)   Wt 86.6 kg (190 lb 14.7 oz)   BMI 29.03 kg/m²     GENERAL: Pleasant gentleman presents to clinic with non-labored breathing.  HEENT: PER, no nasal discharge, no icterus, no oral exudates, moist mucosal membranes.  NECK: no thyroid mass, no lymphadenopathy.  HEART: RRR S1/S2, no rubs, good peripheral pulses.  LUNGS: CTA bilaterally, no wheezing, breathing is nonlabored.  ABDOMEN: soft, nontender, not distended, bowel sounds are present, no abdominal hernia  EXTREM:  bilateral LE edema  SKIN: no rashes, skin is warm and dry.  NEURO: A & O x 3, no obvious focal signs.    LABORATORY RESULTS:    Lab Results   Component Value Date    CREATININE 2.3 (H) 07/11/2020    BUN 30 (H) 07/11/2020     07/11/2020    K 4.4 07/11/2020     07/11/2020    CO2 29 07/11/2020    "   Lab Results   Component Value Date    PTH 88.0 (H) 10/08/2019    CALCIUM 9.0 07/11/2020    PHOS 3.3 07/11/2020     Lab Results   Component Value Date    ALBUMIN 2.3 (L) 07/11/2020     Lab Results   Component Value Date    WBC 5.90 06/23/2020    HGB 11.7 (L) 06/23/2020    HCT 37.4 (L) 06/23/2020    MCV 77 (L) 06/23/2020     (L) 06/23/2020     Protein Creatinine Ratios:    Creatinine, Random Ur   Date Value Ref Range Status   05/08/2020 35.0 23.0 - 375.0 mg/dL Final     Comment:     The random urine reference ranges provided were established   for 24 hour urine collections.  No reference ranges exist for  random urine specimens.  Correlate clinically.     11/27/2019 95.0 23.0 - 375.0 mg/dL Final     Comment:     The random urine reference ranges provided were established   for 24 hour urine collections.  No reference ranges exist for  random urine specimens.  Correlate clinically.     07/15/2019 123.0 23.0 - 375.0 mg/dL Final     Comment:     The random urine reference ranges provided were established   for 24 hour urine collections.  No reference ranges exist for  random urine specimens.  Correlate clinically.       Protein, Urine Random   Date Value Ref Range Status   05/08/2020 55 (H) 0 - 15 mg/dL Final     Comment:     The random urine reference ranges provided were established   for 24 hour urine collections.  No reference ranges exist for  random urine specimens.  Correlate clinically.     11/27/2019 105 (H) 0 - 15 mg/dL Final     Comment:     The random urine reference ranges provided were established   for 24 hour urine collections.  No reference ranges exist for  random urine specimens.  Correlate clinically.     07/15/2019 8 0 - 15 mg/dL Final     Comment:     The random urine reference ranges provided were established   for 24 hour urine collections.  No reference ranges exist for  random urine specimens.  Correlate clinically.       Prot/Creat Ratio, Ur   Date Value Ref Range Status   05/08/2020  1.57 (H) 0.00 - 0.20 Final   11/27/2019 1.11 (H) 0.00 - 0.20 Final   07/15/2019 0.07 0.00 - 0.20 Final             ASSESSMENT AND PLAN:  65 y.o. male with history of CKD 3/4, HTN, CHF, anemia, DM2, CAD, pulmonary hypertension, CHF presents to the renal clinic for evaluation of renal insufficiency.     1. Renal insufficiency: Patient's renal function has improved since last visit and creatinine has declined to 2.3 (from 3). Patient likely suffers from mild cardiorenal syndrome (EF 20-25%) and creatinine has been fluctuating (in addition to diabetic nephropathy). Will continue Entresto for proteinuria.   Patient's renal function will be monitored closely and he will return to the clinic in 2-3 months for follow up. Patient is on fluid restrictions (about 40-50 ounces per day).     2. Electrolytes: at goal.     3. Acid base status:no issues.     4. Volume: Bilateral  LE edema. Continue Bumex.     5. Hypertension: acceptable BP control.     6. Medications: Reviewed.     7. DM2: well controlled with last HgA1c at 6.1 (6/6/20).     8. CAD/CHF: As per Cardiology (Dr. Calvillo).     9. Hyperparathyroidism: vitamin D OTC.

## 2020-09-01 DIAGNOSIS — I50.9 ACUTE ON CHRONIC CONGESTIVE HEART FAILURE: ICD-10-CM

## 2020-09-02 RX ORDER — BUMETANIDE 2 MG/1
2 TABLET ORAL 2 TIMES DAILY
Qty: 60 TABLET | Refills: 5 | Status: SHIPPED | OUTPATIENT
Start: 2020-09-02

## 2020-09-29 ENCOUNTER — CLINICAL SUPPORT (OUTPATIENT)
Dept: CARDIOLOGY | Facility: HOSPITAL | Age: 65
End: 2020-09-29
Payer: COMMERCIAL

## 2020-09-29 ENCOUNTER — PATIENT MESSAGE (OUTPATIENT)
Dept: OTHER | Facility: OTHER | Age: 65
End: 2020-09-29

## 2020-09-29 PROCEDURE — 99999 PR PBB SHADOW E&M-EST. PATIENT-LVL I: CPT | Mod: PBBFAC,,,

## 2020-09-29 PROCEDURE — 99999 PR PBB SHADOW E&M-EST. PATIENT-LVL I: ICD-10-PCS | Mod: PBBFAC,,,

## 2020-10-06 ENCOUNTER — PATIENT MESSAGE (OUTPATIENT)
Dept: ADMINISTRATIVE | Facility: HOSPITAL | Age: 65
End: 2020-10-06

## 2020-10-07 ENCOUNTER — LAB VISIT (OUTPATIENT)
Dept: LAB | Facility: HOSPITAL | Age: 65
End: 2020-10-07
Attending: INTERNAL MEDICINE
Payer: MEDICARE

## 2020-10-07 DIAGNOSIS — N18.30 CKD (CHRONIC KIDNEY DISEASE) STAGE 3, GFR 30-59 ML/MIN: ICD-10-CM

## 2020-10-07 DIAGNOSIS — N25.81 HYPERPARATHYROIDISM, SECONDARY RENAL: ICD-10-CM

## 2020-10-07 LAB
ALBUMIN SERPL BCP-MCNC: 3.2 G/DL (ref 3.5–5.2)
ANION GAP SERPL CALC-SCNC: 8 MMOL/L (ref 8–16)
BASOPHILS # BLD AUTO: 0.04 K/UL (ref 0–0.2)
BASOPHILS NFR BLD: 0.8 % (ref 0–1.9)
BUN SERPL-MCNC: 35 MG/DL (ref 8–23)
CALCIUM SERPL-MCNC: 9.3 MG/DL (ref 8.7–10.5)
CHLORIDE SERPL-SCNC: 100 MMOL/L (ref 95–110)
CO2 SERPL-SCNC: 28 MMOL/L (ref 23–29)
CREAT SERPL-MCNC: 2.2 MG/DL (ref 0.5–1.4)
DIFFERENTIAL METHOD: ABNORMAL
EOSINOPHIL # BLD AUTO: 0.2 K/UL (ref 0–0.5)
EOSINOPHIL NFR BLD: 3.3 % (ref 0–8)
ERYTHROCYTE [DISTWIDTH] IN BLOOD BY AUTOMATED COUNT: 14.5 % (ref 11.5–14.5)
EST. GFR  (AFRICAN AMERICAN): 35 ML/MIN/1.73 M^2
EST. GFR  (NON AFRICAN AMERICAN): 30 ML/MIN/1.73 M^2
GLUCOSE SERPL-MCNC: 107 MG/DL (ref 70–110)
HCT VFR BLD AUTO: 38 % (ref 40–54)
HGB BLD-MCNC: 12.4 G/DL (ref 14–18)
IMM GRANULOCYTES # BLD AUTO: 0.01 K/UL (ref 0–0.04)
IMM GRANULOCYTES NFR BLD AUTO: 0.2 % (ref 0–0.5)
LYMPHOCYTES # BLD AUTO: 2.1 K/UL (ref 1–4.8)
LYMPHOCYTES NFR BLD: 39.9 % (ref 18–48)
MCH RBC QN AUTO: 28.4 PG (ref 27–31)
MCHC RBC AUTO-ENTMCNC: 32.6 G/DL (ref 32–36)
MCV RBC AUTO: 87 FL (ref 82–98)
MONOCYTES # BLD AUTO: 0.5 K/UL (ref 0.3–1)
MONOCYTES NFR BLD: 9.9 % (ref 4–15)
NEUTROPHILS # BLD AUTO: 2.4 K/UL (ref 1.8–7.7)
NEUTROPHILS NFR BLD: 46.1 % (ref 38–73)
NRBC BLD-RTO: 0 /100 WBC
PHOSPHATE SERPL-MCNC: 3 MG/DL (ref 2.7–4.5)
PLATELET # BLD AUTO: 173 K/UL (ref 150–350)
PMV BLD AUTO: 12.5 FL (ref 9.2–12.9)
POTASSIUM SERPL-SCNC: 4.2 MMOL/L (ref 3.5–5.1)
PTH-INTACT SERPL-MCNC: 78 PG/ML (ref 9–77)
RBC # BLD AUTO: 4.37 M/UL (ref 4.6–6.2)
SODIUM SERPL-SCNC: 136 MMOL/L (ref 136–145)
WBC # BLD AUTO: 5.14 K/UL (ref 3.9–12.7)

## 2020-10-07 PROCEDURE — 83970 ASSAY OF PARATHORMONE: CPT

## 2020-10-07 PROCEDURE — 80069 RENAL FUNCTION PANEL: CPT

## 2020-10-07 PROCEDURE — 85025 COMPLETE CBC W/AUTO DIFF WBC: CPT

## 2020-10-07 PROCEDURE — 36415 COLL VENOUS BLD VENIPUNCTURE: CPT

## 2020-10-14 RX ORDER — ALLOPURINOL 100 MG/1
100 TABLET ORAL DAILY
Qty: 90 TABLET | Refills: 0 | Status: SHIPPED | OUTPATIENT
Start: 2020-10-14 | End: 2021-01-22

## 2020-10-16 ENCOUNTER — OFFICE VISIT (OUTPATIENT)
Dept: NEPHROLOGY | Facility: CLINIC | Age: 65
End: 2020-10-16
Payer: MEDICARE

## 2020-10-16 ENCOUNTER — OFFICE VISIT (OUTPATIENT)
Dept: OPHTHALMOLOGY | Facility: CLINIC | Age: 65
End: 2020-10-16
Payer: MEDICARE

## 2020-10-16 VITALS
WEIGHT: 186.31 LBS | DIASTOLIC BLOOD PRESSURE: 80 MMHG | SYSTOLIC BLOOD PRESSURE: 120 MMHG | BODY MASS INDEX: 28.24 KG/M2 | HEIGHT: 68 IN | RESPIRATION RATE: 20 BRPM | HEART RATE: 78 BPM

## 2020-10-16 DIAGNOSIS — N25.81 HYPERPARATHYROIDISM, SECONDARY RENAL: ICD-10-CM

## 2020-10-16 DIAGNOSIS — E11.29 PROTEINURIA DUE TO TYPE 2 DIABETES MELLITUS: ICD-10-CM

## 2020-10-16 DIAGNOSIS — N18.4 CKD (CHRONIC KIDNEY DISEASE) STAGE 4, GFR 15-29 ML/MIN: Primary | ICD-10-CM

## 2020-10-16 DIAGNOSIS — Z79.4 CONTROLLED TYPE 2 DIABETES MELLITUS WITH LEFT EYE AFFECTED BY MILD NONPROLIFERATIVE RETINOPATHY WITHOUT MACULAR EDEMA, WITH LONG-TERM CURRENT USE OF INSULIN: ICD-10-CM

## 2020-10-16 DIAGNOSIS — R80.9 PROTEINURIA DUE TO TYPE 2 DIABETES MELLITUS: ICD-10-CM

## 2020-10-16 DIAGNOSIS — E11.3292 CONTROLLED TYPE 2 DIABETES MELLITUS WITH LEFT EYE AFFECTED BY MILD NONPROLIFERATIVE RETINOPATHY WITHOUT MACULAR EDEMA, WITH LONG-TERM CURRENT USE OF INSULIN: ICD-10-CM

## 2020-10-16 DIAGNOSIS — H40.1132 PRIMARY OPEN ANGLE GLAUCOMA (POAG) OF BOTH EYES, MODERATE STAGE: Primary | ICD-10-CM

## 2020-10-16 PROCEDURE — 92133 POSTERIOR SEGMENT OCT OPTIC NERVE(OCULAR COHERENCE TOMOGRAPHY) - OU - BOTH EYES: ICD-10-PCS | Mod: 26,S$PBB,, | Performed by: OPHTHALMOLOGY

## 2020-10-16 PROCEDURE — 92014 PR EYE EXAM, EST PATIENT,COMPREHESV: ICD-10-PCS | Mod: S$PBB,,, | Performed by: OPHTHALMOLOGY

## 2020-10-16 PROCEDURE — 99214 PR OFFICE/OUTPT VISIT, EST, LEVL IV, 30-39 MIN: ICD-10-PCS | Mod: S$PBB,,, | Performed by: INTERNAL MEDICINE

## 2020-10-16 PROCEDURE — 99999 PR PBB SHADOW E&M-EST. PATIENT-LVL V: ICD-10-PCS | Mod: PBBFAC,,, | Performed by: INTERNAL MEDICINE

## 2020-10-16 PROCEDURE — 99213 OFFICE O/P EST LOW 20 MIN: CPT | Mod: PBBFAC,25 | Performed by: OPHTHALMOLOGY

## 2020-10-16 PROCEDURE — 99999 PR PBB SHADOW E&M-EST. PATIENT-LVL V: CPT | Mod: PBBFAC,,, | Performed by: INTERNAL MEDICINE

## 2020-10-16 PROCEDURE — 92133 CPTRZD OPH DX IMG PST SGM ON: CPT | Mod: PBBFAC | Performed by: OPHTHALMOLOGY

## 2020-10-16 PROCEDURE — 99215 OFFICE O/P EST HI 40 MIN: CPT | Mod: PBBFAC,27,25 | Performed by: INTERNAL MEDICINE

## 2020-10-16 PROCEDURE — 99999 PR PBB SHADOW E&M-EST. PATIENT-LVL III: CPT | Mod: PBBFAC,,, | Performed by: OPHTHALMOLOGY

## 2020-10-16 PROCEDURE — 99214 OFFICE O/P EST MOD 30 MIN: CPT | Mod: S$PBB,,, | Performed by: INTERNAL MEDICINE

## 2020-10-16 PROCEDURE — 99999 PR PBB SHADOW E&M-EST. PATIENT-LVL III: ICD-10-PCS | Mod: PBBFAC,,, | Performed by: OPHTHALMOLOGY

## 2020-10-16 PROCEDURE — 92014 COMPRE OPH EXAM EST PT 1/>: CPT | Mod: S$PBB,,, | Performed by: OPHTHALMOLOGY

## 2020-10-16 RX ORDER — BRIMONIDINE TARTRATE AND TIMOLOL MALEATE 2; 5 MG/ML; MG/ML
1 SOLUTION OPHTHALMIC EVERY 12 HOURS
Qty: 10 ML | Refills: 6 | Status: SHIPPED | OUTPATIENT
Start: 2020-10-16 | End: 2021-10-16

## 2020-10-16 RX ORDER — BIMATOPROST 0.3 MG/ML
1 SOLUTION/ DROPS OPHTHALMIC NIGHTLY
Qty: 2.5 ML | Refills: 11 | Status: SHIPPED | OUTPATIENT
Start: 2020-10-16 | End: 2021-10-16

## 2020-10-16 NOTE — PROGRESS NOTES
"    ===============================  Yan Choudhury,  10/16/2020 today   65 y.o. male   Last visit [facility]: :8/16/2019   Last visit eye dept. Visit date not found  [facility]:  Corrected distance visual acuity was NLP in the right eye and 20/60 in the left eye.  Tonometry     Tonometry (Applanation, 8:22 AM)       Right Left    Pressure  12              Wearing Rx     Wearing Rx       Sphere Cylinder Axis Add    Right Balance       Left -2.50 +1.00 075 +2.50    Type: Bifocal              Manifest Refraction     Manifest Refraction       Sphere Dist [facility]    Right      Left pc 20/60               Not recorded        Chief Complaint   Patient presents with    Glaucoma     pt states he ran out of gtts a couple months ago    Diabetic Eye Exam     no va c/o     Ophthalmic Medications     Ophthalmic-Intraocular Pressure Reducing Agents, Prostaglandin Analogs Start End     bimatoprost (LUMIGAN) 0.03 % ophthalmic drops    10/16/2020 10/16/2021    Sig: Place 1 drop into the left eye every evening.    Route: Left Eye     latanoprost (XALATAN) 0.005 % ophthalmic solution    8/21/2019 8/20/2020    Sig: Place 1 drop into both eyes once daily.    Route: Both Eyes     bimatoprost (LUMIGAN) 0.03 % ophthalmic drops (Discontinued)    8/16/2019 10/16/2020    Sig: Place 1 drop into the left eye every evening.    Route: Left Eye    Reason for Discontinue: Reorder        ________________  10/16/2020 today  HPI     Glaucoma      Additional comments: pt states he ran out of gtts a couple months ago              Diabetic Eye Exam      Additional comments: no va c/o              Comments     --DM since 2000  --H/O BDR and DME OS  H/o Av [facility] Sh[facility] 2014  --Focal OS June 2011  --VRTX/VMA OS "would not expect improvement with treatment secondary to   VRTX"  --Prosthesis OD secondary to trauma  --COAG   Resumed xalatan ou qhs 12/11/17  Tmax 25  (-1)  Gonio Open  Low RNFL  Last VF Bjerrum OS 12/11/17  HVF 5/17/19    Lumigan QHS OS          Last edited by " Rosa Marin, OA on 10/16/2020  8:17 AM. (History)      Problem List Items Addressed This Visit        Eye/Vision problems    Primary open-angle glaucoma, moderate stage - Primary    Relevant Medications    bimatoprost (LUMIGAN) 0.03 % ophthalmic drops    brimonidine-timoloL (COMBIGAN) 0.2-0.5 % Drop    Other Relevant Orders    Posterior Segment OCT Optic Nerve- Both eyes    Controlled type 2 diabetes mellitus with left eye affected by mild nonproliferative retinopathy without macular edema, with long-term current use of insulin      lost to follow up over a year  Coag    c:d  .vf tayoum  sonam open  rnfl low worse  Minimal BDR, no DME   Only eye  Discussed importance of compliance  Continue combigan bid ou, lumigan qhs ou  Refills today  rtc 4 months, vf        ===========================

## 2020-10-16 NOTE — PROGRESS NOTES
PROGRESS NOTE FOR ESTABLISHED PATIENT    PHYSICIAN REQUESTING THE CONSULT: Dr. Sandra Estevez    REASON FOR VISIT: Renal insufficiency    65 y.o. male with history of CKD 3/4, HTN, CHF, anemia, DM2, CAD, pulmonary hypertension presents to the renal clinic for evaluation of renal insufficiency.       Patient had recent hospital stay at [facility] in 11/2018 when he presented with leak from colostomy reversal and BROOKE. Peak creatinine was 8.2 on 11/15/18. Renal function has recovered with IV fluids and creatinine has now declined to 2.7 (7/15/19).    July 22, 2019:  Patient today reports mild LE edema today.  No other issues.      December 10, 2019:  Patient feeling OK today. Renal function has slightly improved with creatinine at 2.4 (down from 2.7).     May 15, 2020:  Patient reports worsening LE edema and mild SOB.     August 19, 2020:  Patient was diagnosed with SARS-CoV-2 on 7/8/20. Today, he reports mild SOB.  Last creatinine was 2.3 (7/11/20).     October 16, 2020:  Creatinine stable at 2.2. Patient has no complaints at present.         Past Medical History:   Diagnosis Date    Arthritis     CAD (coronary artery disease)     Cataract     Chronic combined systolic and diastolic congestive heart failure 3/24/2015    CKD (chronic kidney disease), stage III     Diabetes mellitus type II      AM    Diabetic retinopathy     anti Veg F injections for macular edema    Diverticulitis of colon     ED (erectile dysfunction)     Encounter for blood transfusion     Glaucoma     HTN (hypertension)     Hyperlipidemia     Ischemic cardiomyopathy     Obesity     JAHAIRA on CPAP     Pacemaker     Pulmonary HTN        Past Surgical History:   Procedure Laterality Date    CARDIAC CATHETERIZATION  2009    CHOLECYSTECTOMY      COLON SURGERY      Sigmoid resection    COLONOSCOPY N/A 10/11/2018    Procedure: COLONOSCOPY;  Surgeon: Quinn Aragon MD;  Location: [facility];  Service: General;  Laterality: N/A;    EYE  SURGERY      FLEXIBLE SIGMOIDOSCOPY N/A 1/18/2019    Procedure: SIGMOIDOSCOPY, FLEXIBLE;  Surgeon: Quinn Aragon MD;  Location: [facility] ENDO;  Service: General;  Laterality: N/A;    ILEOSTOMY N/A 10/30/2018    Procedure: CREATION, ILEOSTOMY;  Surgeon: Kevin Lindsay MD;  Location: [facility] OR;  Service: General;  Laterality: N/A;    Ileostomy reversal  01/2019    KNEE SURGERY      MOBILIZATION OF SPLENIC FLEXURE N/A 10/30/2018    Procedure: MOBILIZATION, SPLENIC FLEXURE;  Surgeon: Kevin Lindsay MD;  Location: [facility] OR;  Service: General;  Laterality: N/A;    REVISION COLOSTOMY N/A 10/30/2018    Procedure: REVISION OR CLOSURE, COLOSTOMY;  Surgeon: Kevin Lindsay MD;  Location: [facility] OR;  Service: General;  Laterality: N/A;  Parastomal Hernia Repair    RIGHT HEMICOLECTOMY Right 10/30/2018    Procedure: HEMICOLECTOMY, RIGHT;  Surgeon: Kevin Lindsay MD;  Location: [facility] OR;  Service: General;  Laterality: Right;       Review of patient's allergies indicates:  No Known Allergies    Current Outpatient Medications   Medication Sig Dispense Refill    albuterol (PROVENTIL/VENTOLIN HFA) 90 mcg/actuation inhaler Inhale 2 puffs into the lungs every 6 (six) hours as needed for Wheezing. Rescue 18 g 0    allopurinoL (ZYLOPRIM) 100 MG tablet Take 1 tablet (100 mg total) by mouth once daily. 90 tablet 0    aspirin (ECOTRIN) 81 MG EC tablet Take 81 mg by mouth every morning.       BD INSULIN SYRINGE ULTRA-FINE 0.5 mL 31 gauge x 5/16 Syrg USE AS DIRECTED TO INJECT INSULIN TWO TIMES DAILY 60 each 11    bimatoprost (LUMIGAN) 0.03 % ophthalmic drops Place 1 drop into the left eye every evening. 2.5 mL 11    blood sugar diagnostic Strp 1 strip by Misc.(Non-Drug; Combo Route) route 4 (four) times daily. Telcare 120 strip 11    brimonidine-timoloL (COMBIGAN) 0.2-0.5 % Drop Place 1 drop into both eyes every 12 (twelve) hours. 10 mL 6    bumetanide (BUMEX) 2 MG tablet Take 1 tablet (2 mg total) by mouth 2 (two) times  "daily. TAKE 1 TABLET (2 MG TOTAL) BY MOUTH three times a day 60 tablet 5    cholecalciferol, vitamin D3, (VITAMIN D3) 1,000 unit capsule Take 1,000 Units by mouth once daily.      dextromethorphan-guaifenesin  mg (MUCINEX DM)  mg per 12 hr tablet Take 1 tablet by mouth 2 (two) times daily as needed.      ENTRESTO 24-26 mg per tablet TAKE 1 TABLET BY MOUTH TWICE A DAY 60 tablet 12    hydrOXYzine HCL (ATARAX) 10 MG Tab Take 1 tablet (10 mg total) by mouth daily as needed. 30 tablet 0    insulin glargine (LANTUS U-100 INSULIN) 100 unit/mL injection INJECT 20 UNITS ONCE DAILY AS NEEDED WHEN BS< 150 30 mL 11    IRON/VITAMIN B COMPLEX (GERITOL ORAL) Take by mouth.      ketoconazole (NIZORAL) 2 % cream AAA bid prn rash of groin. For Flares. 60 g 3    lancets [facility] For tel care 120 each 11    multivit-min/folic/vit K/lycop (MEN'S 50 PLUS MULTIVITAMIN ORAL) Take by mouth.      pantoprazole (PROTONIX) 40 MG tablet TAKE 1 TABLET BY MOUTH EVERY DAY FOR ACID REFLUX 90 tablet 1    pen needle, diabetic (BD ULTRA-FINE JOSLYN PEN NEEDLE) 32 gauge x 5/32" Ndle 1 each by Misc.(Non-Drug; Combo Route) route 4 (four) times daily. 100 each 11    pulse oximeter (PULSE OXIMETER) device Apply Externally route 2 (two) times a day. Use twice daily at 8 AM and 3 PM and record the value in [facility] as directed. 1 each 0    simvastatin (ZOCOR) 10 MG tablet TAKE 1 TABLET BY MOUTH EVERY DAY IN THE EVENING 90 tablet 4    sodium bicarbonate 650 MG tablet Take 1 tablet (650 mg total) by mouth 3 (three) times daily. (Patient taking differently: Take 650 mg by mouth every Mon, Wed, Fri. ) 90 tablet 11    carvediloL (COREG) 3.125 MG tablet Take 1 tablet (3.125 mg total) by mouth 2 (two) times daily with meals. 60 tablet 12    dorzolamide-timolol 2-0.5% (COSOPT) 22.3-6.8 mg/mL ophthalmic solution Place 1 drop into both eyes 2 (two) times daily. 10 mL 2    latanoprost (XALATAN) 0.005 % ophthalmic solution Place 1 drop into both " eyes once daily. 2.5 mL 4     No current facility-administered medications for this visit.        Family History   Problem Relation Age of Onset    Cancer Mother     Heart disease Father     Stroke Father     No Known Problems Sister     No Known Problems Brother     No Known Problems Maternal Aunt     No Known Problems Maternal Uncle     No Known Problems Paternal Aunt     No Known Problems Paternal Uncle     No Known Problems Maternal Grandmother     No Known Problems Maternal Grandfather     No Known Problems Paternal Grandmother     No Known Problems Paternal Grandfather     Amblyopia Neg Hx     Blindness Neg Hx     Cataracts Neg Hx     Diabetes Neg Hx     Glaucoma Neg Hx     Hypertension Neg Hx     Macular degeneration Neg Hx     Retinal detachment Neg Hx     Strabismus Neg Hx     Thyroid disease Neg Hx        Social History     Socioeconomic History    Marital status:      Spouse name: Not on file    Number of children: 2    Years of education: Not on file    Highest education level: Not on file   Occupational History    Occupation: [employer]     Employer: [employer]   Social Needs    Financial resource strain: Not on file    Food insecurity     Worry: Not on file     Inability: Not on file    Transportation needs     Medical: Not on file     Non-medical: Not on file   Tobacco Use    Smoking status: Never Smoker    Smokeless tobacco: Never Used   Substance and Sexual Activity    Alcohol use: No     Alcohol/week: 0.0 standard drinks     Frequency: Never    Drug use: No    Sexual activity: Not Currently   Lifestyle    Physical activity     Days per week: Not on file     Minutes per session: Not on file    Stress: Not on file   Relationships    Social connections     Talks on phone: Not on file     Gets together: Not on file     Attends Christian service: Not on file     Active member of club or organization: Not on file     Attends meetings of clubs  "or organizations: Not on file     Relationship status: Not on file   Other Topics Concern    Not on file   Social History Narrative    . Lives with spouse. Has 2 children. 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       Review of Systems:  1. GENERAL: patient denies any fever, weight changes, generalized weakness, dizziness.  2. HEENT: patient denies headaches, visual disturbances, swallowing problems, sinus pain, nasal congestion.  3. CARDIOVASCULAR: patient denies chest pain, palpitations.  4. PULMONARY: patient denies SOB, no coughing, hemoptysis, wheezing.  5. GASTROINTESTINAL: patient denies abdominal pain, nausea, vomiting, diarrhea.  6. GENITOURINARY: patient denies dysuria, hematuria, hesitancy, frequency.  7. EXTREMITIES: patient reports chronic LE edema, no LE cramping.  8. DERMATOLOGY: patient denies rashes, ulcers.  9. NEURO: patient denies tremors, extremity weakness, extremity numbness/tingling.  10. MUSCULOSKELETAL: patient denies joint pain, joint swelling.  11. HEMATOLOGY: patient denies rectal or gum bleeding.  12: PSYCH: patient denies anxiety, depression.      PHYSICAL EXAM:  /80   Pulse 78   Resp 20   Ht 5' 8" (1.727 m)   Wt 84.5 kg (186 lb 4.6 oz)   BMI 28.33 kg/m²     GENERAL: Pleasant gentleman presents to clinic with non-labored breathing.  HEENT: PER, no nasal discharge, no icterus, no oral exudates, moist mucosal membranes.  NECK: no thyroid mass, no lymphadenopathy.  HEART: RRR S1/S2, no rubs, good peripheral pulses.  LUNGS: CTA bilaterally, no wheezing, breathing is nonlabored.  ABDOMEN: soft, nontender, not distended, bowel sounds are present, no abdominal hernia  EXTREM:  bilateral LE edema  SKIN: no rashes, skin is warm and dry.  NEURO: A & O x 3, no obvious focal signs.    LABORATORY RESULTS:    Lab Results   Component Value Date    CREATININE 2.2 (H) 10/07/2020    BUN 35 (H) 10/07/2020     10/07/2020    K 4.2 10/07/2020     " 10/07/2020    CO2 28 10/07/2020      Lab Results   Component Value Date    PTH 78.0 (H) 10/07/2020    CALCIUM 9.3 10/07/2020    PHOS 3.0 10/07/2020     Lab Results   Component Value Date    ALBUMIN 3.2 (L) 10/07/2020     Lab Results   Component Value Date    WBC 5.14 10/07/2020    HGB 12.4 (L) 10/07/2020    HCT 38.0 (L) 10/07/2020    MCV 87 10/07/2020     10/07/2020     Protein Creatinine Ratios:    Creatinine, Random Ur   Date Value Ref Range Status   10/07/2020 43.0 23.0 - 375.0 mg/dL Final     Comment:     The random urine reference ranges provided were established   for 24 hour urine collections.  No reference ranges exist for  random urine specimens.  Correlate clinically.     05/08/2020 35.0 23.0 - 375.0 mg/dL Final     Comment:     The random urine reference ranges provided were established   for 24 hour urine collections.  No reference ranges exist for  random urine specimens.  Correlate clinically.     11/27/2019 95.0 23.0 - 375.0 mg/dL Final     Comment:     The random urine reference ranges provided were established   for 24 hour urine collections.  No reference ranges exist for  random urine specimens.  Correlate clinically.       Protein, Urine Random   Date Value Ref Range Status   10/07/2020 89 (H) 0 - 15 mg/dL Final     Comment:     The random urine reference ranges provided were established   for 24 hour urine collections.  No reference ranges exist for  random urine specimens.  Correlate clinically.     05/08/2020 55 (H) 0 - 15 mg/dL Final     Comment:     The random urine reference ranges provided were established   for 24 hour urine collections.  No reference ranges exist for  random urine specimens.  Correlate clinically.     11/27/2019 105 (H) 0 - 15 mg/dL Final     Comment:     The random urine reference ranges provided were established   for 24 hour urine collections.  No reference ranges exist for  random urine specimens.  Correlate clinically.       Prot/Creat Ratio, Ur   Date Value  Ref Range Status   10/07/2020 2.07 (H) 0.00 - 0.20 Final   05/08/2020 1.57 (H) 0.00 - 0.20 Final   11/27/2019 1.11 (H) 0.00 - 0.20 Final             ASSESSMENT AND PLAN:  65 y.o. male with history of CKD 3/4, HTN, CHF, anemia, DM2, CAD, pulmonary hypertension, CHF presents to the renal clinic for evaluation of renal insufficiency.     1. Renal insufficiency: Patient's renal function has stabilized with creatinine at 2.2.  Patient likely suffers from cardiorenal syndrome (EF 20-25%) and creatinine has been fluctuating (in addition to diabetic nephropathy). Will continue Entresto for proteinuria.   Patient's renal function will be monitored closely and he will return to the clinic in 4 months for follow up. Patient is on fluid restrictions (about 40-50 ounces per day).     2. Electrolytes: at goal.     3. Acid base status:no issues.     4. Volume: Bilateral  LE edema. Continue Bumex.     5. Hypertension: good BP control.     6. Medications: Reviewed.     7. DM2: well controlled with last HgA1c at 6.1.      8. CAD/CHF: As per Cardiology (Dr. Calvillo).     9. Hyperparathyroidism: vitamin D OTC.

## 2020-10-22 ENCOUNTER — TELEPHONE (OUTPATIENT)
Dept: INTERNAL MEDICINE | Facility: CLINIC | Age: 65
End: 2020-10-22

## 2020-10-22 NOTE — TELEPHONE ENCOUNTER
----- Message from Kathi Hayes sent at 10/22/2020  9:23 AM CDT -----  Regarding: medication clarity  Good morning,    Pt wife called to see if it is ok to take [place] kind due to his underlining conditions. She can be reached at [phone]      Thanks,  Kathi Hayes

## 2020-10-22 NOTE — TELEPHONE ENCOUNTER
S/w pt's wife. Pt's wife would like to know if it is safe for pt to take OTC Omega 3 fish oil supplements. Pt's wife is concerned d/t pt having kidney disease and other underlying conditions. Please advise

## 2020-10-22 NOTE — TELEPHONE ENCOUNTER
S/w pt's wife . Informed pt's wife it is ok for pt to take Omega 3. Pt's wife acknowledged understanding and was thankful for the call back. Call ended well.

## 2020-10-23 ENCOUNTER — HOSPITAL ENCOUNTER (EMERGENCY)
Facility: HOSPITAL | Age: 65
Discharge: HOME | End: 2020-10-23
Attending: FAMILY MEDICINE
Payer: MEDICARE

## 2020-10-23 VITALS
HEART RATE: 71 BPM | TEMPERATURE: 98 F | OXYGEN SATURATION: 100 % | BODY MASS INDEX: 28.33 KG/M2 | SYSTOLIC BLOOD PRESSURE: 111 MMHG | DIASTOLIC BLOOD PRESSURE: 71 MMHG | RESPIRATION RATE: 18 BRPM | HEIGHT: 68 IN

## 2020-10-23 DIAGNOSIS — K62.5 RECTAL BLEED: ICD-10-CM

## 2020-10-23 DIAGNOSIS — K57.92 DIVERTICULITIS: Primary | ICD-10-CM

## 2020-10-23 LAB
ABO + RH BLD: NORMAL
ALBUMIN SERPL BCP-MCNC: 3.1 G/DL (ref 3.5–5.2)
ALP SERPL-CCNC: 61 U/L (ref 55–135)
ALT SERPL W/O P-5'-P-CCNC: 12 U/L (ref 10–44)
ANION GAP SERPL CALC-SCNC: 9 MMOL/L (ref 8–16)
APTT BLDCRRT: 28.2 SEC (ref 21–32)
AST SERPL-CCNC: 25 U/L (ref 10–40)
BASOPHILS # BLD AUTO: 0.03 K/UL (ref 0–0.2)
BASOPHILS NFR BLD: 0.4 % (ref 0–1.9)
BILIRUB SERPL-MCNC: 0.7 MG/DL (ref 0.1–1)
BLD GP AB SCN CELLS X3 SERPL QL: NORMAL
BNP SERPL-MCNC: 1500 PG/ML (ref 0–99)
BUN SERPL-MCNC: 23 MG/DL (ref 8–23)
CALCIUM SERPL-MCNC: 8.8 MG/DL (ref 8.7–10.5)
CHLORIDE SERPL-SCNC: 104 MMOL/L (ref 95–110)
CO2 SERPL-SCNC: 22 MMOL/L (ref 23–29)
CREAT SERPL-MCNC: 2.7 MG/DL (ref 0.5–1.4)
DIFFERENTIAL METHOD: ABNORMAL
EOSINOPHIL # BLD AUTO: 0.1 K/UL (ref 0–0.5)
EOSINOPHIL NFR BLD: 1.3 % (ref 0–8)
ERYTHROCYTE [DISTWIDTH] IN BLOOD BY AUTOMATED COUNT: 14.6 % (ref 11.5–14.5)
EST. GFR  (AFRICAN AMERICAN): 27 ML/MIN/1.73 M^2
EST. GFR  (NON AFRICAN AMERICAN): 23 ML/MIN/1.73 M^2
GLUCOSE SERPL-MCNC: 136 MG/DL (ref 70–110)
HCT VFR BLD AUTO: 34.5 % (ref 40–54)
HGB BLD-MCNC: 11 G/DL (ref 14–18)
IMM GRANULOCYTES # BLD AUTO: 0.02 K/UL (ref 0–0.04)
IMM GRANULOCYTES NFR BLD AUTO: 0.3 % (ref 0–0.5)
INR PPP: 1.1 (ref 0.8–1.2)
LYMPHOCYTES # BLD AUTO: 1.7 K/UL (ref 1–4.8)
LYMPHOCYTES NFR BLD: 24.4 % (ref 18–48)
MCH RBC QN AUTO: 28 PG (ref 27–31)
MCHC RBC AUTO-ENTMCNC: 31.9 G/DL (ref 32–36)
MCV RBC AUTO: 88 FL (ref 82–98)
MONOCYTES # BLD AUTO: 0.4 K/UL (ref 0.3–1)
MONOCYTES NFR BLD: 6.3 % (ref 4–15)
NEUTROPHILS # BLD AUTO: 4.6 K/UL (ref 1.8–7.7)
NEUTROPHILS NFR BLD: 67.3 % (ref 38–73)
NRBC BLD-RTO: 0 /100 WBC
PLATELET # BLD AUTO: 114 K/UL (ref 150–350)
PMV BLD AUTO: ABNORMAL FL (ref 9.2–12.9)
POTASSIUM SERPL-SCNC: 3.9 MMOL/L (ref 3.5–5.1)
PROT SERPL-MCNC: 6.8 G/DL (ref 6–8.4)
PROTHROMBIN TIME: 11.9 SEC (ref 9–12.5)
RBC # BLD AUTO: 3.93 M/UL (ref 4.6–6.2)
SODIUM SERPL-SCNC: 135 MMOL/L (ref 136–145)
TROPONIN I SERPL DL<=0.01 NG/ML-MCNC: 0.05 NG/ML (ref 0–0.03)
WBC # BLD AUTO: 6.8 K/UL (ref 3.9–12.7)

## 2020-10-23 PROCEDURE — 80053 COMPREHEN METABOLIC PANEL: CPT

## 2020-10-23 PROCEDURE — 93010 EKG 12-LEAD: ICD-10-PCS | Mod: ,,, | Performed by: INTERNAL MEDICINE

## 2020-10-23 PROCEDURE — 93005 ELECTROCARDIOGRAM TRACING: CPT

## 2020-10-23 PROCEDURE — 85610 PROTHROMBIN TIME: CPT

## 2020-10-23 PROCEDURE — 93010 ELECTROCARDIOGRAM REPORT: CPT | Mod: ,,, | Performed by: INTERNAL MEDICINE

## 2020-10-23 PROCEDURE — 83880 ASSAY OF NATRIURETIC PEPTIDE: CPT

## 2020-10-23 PROCEDURE — 63600175 PHARM REV CODE 636 W HCPCS: Performed by: FAMILY MEDICINE

## 2020-10-23 PROCEDURE — 85730 THROMBOPLASTIN TIME PARTIAL: CPT

## 2020-10-23 PROCEDURE — 84484 ASSAY OF TROPONIN QUANT: CPT

## 2020-10-23 PROCEDURE — 96365 THER/PROPH/DIAG IV INF INIT: CPT

## 2020-10-23 PROCEDURE — 85025 COMPLETE CBC W/AUTO DIFF WBC: CPT

## 2020-10-23 PROCEDURE — 36415 COLL VENOUS BLD VENIPUNCTURE: CPT

## 2020-10-23 PROCEDURE — 86850 RBC ANTIBODY SCREEN: CPT

## 2020-10-23 PROCEDURE — 99284 EMERGENCY DEPT VISIT MOD MDM: CPT | Mod: 25

## 2020-10-23 PROCEDURE — 25000003 PHARM REV CODE 250: Performed by: FAMILY MEDICINE

## 2020-10-23 RX ORDER — METRONIDAZOLE 500 MG/1
500 TABLET ORAL 3 TIMES DAILY
Qty: 30 TABLET | Refills: 0 | Status: SHIPPED | OUTPATIENT
Start: 2020-10-23 | End: 2020-10-23 | Stop reason: ALTCHOICE

## 2020-10-23 RX ORDER — AMOXICILLIN AND CLAVULANATE POTASSIUM 875; 125 MG/1; MG/1
1 TABLET, FILM COATED ORAL 2 TIMES DAILY
Qty: 20 TABLET | Refills: 0 | Status: SHIPPED | OUTPATIENT
Start: 2020-10-23 | End: 2020-11-02

## 2020-10-23 RX ORDER — CIPROFLOXACIN 500 MG/1
500 TABLET ORAL 2 TIMES DAILY
Qty: 20 TABLET | Refills: 0 | Status: SHIPPED | OUTPATIENT
Start: 2020-10-23 | End: 2020-10-23 | Stop reason: ALTCHOICE

## 2020-10-23 RX ADMIN — PIPERACILLIN AND TAZOBACTAM 4.5 G: 4; .5 INJECTION, POWDER, LYOPHILIZED, FOR SOLUTION INTRAVENOUS; PARENTERAL at 10:10

## 2020-10-24 NOTE — DISCHARGE INSTRUCTIONS
Please follow-up with primary care physician tomorrow.  If you feel short of breath, chest pain or have increased loss of blood per rectum, come back to the emergency department for recheck of hemoglobin.  Take antibiotics as prescribed and until completion.  Also come back if you have abdominal pain or fevers.    Regarding DIVERTICULITIS, I discussed signs and symptoms common with the condition such as: abdominal pain, nausea, vomiting, fever, chills, change in bowel habits (new onset diarrhea or constipation), or a feeling of incomplete evacuations. I educated patient that diverticulitis occurs when inflammation is superimposed on diverticulosis (presence of protrusions or outpouchings of the lining of the colon) and that the condition has a good prognosis when treated with antibiotics and bowel rest.  Regarding treatment, I recommended that the patient: eat a low-fiber diet or a liquid diet to allow the bowel a chance to recover; take all medications as prescribed; monitor temperature and report any rising temperature to healthcare provider; drink at least 6 to 8 glasses of water every day; use a heating pad or hot water bottle to reduce abdominal pain or cramping.  For prevention, I recommended that the patient: eat a high-fiber diet to add bulk to the stool so that it passes through the large intestine more easily; keep drinking 6 to 8 glasses of water every day, unless directed otherwise; begin an exercise program; treat diarrhea with a bland diet (start with liquids only, then slowly add fiber over time); watch for changes in bowel movements (constipation to diarrhea); and get plenty of rest and sleep.  Contact primary care provider for any fever above 100°F (37.7°C), chills, abdominal cramps, nausea, vomiting, or rectal bleeding.

## 2020-10-24 NOTE — ED NOTES
Bed is locked and in lowest position.  Bed rails up x2. Call light is within pt reach. Pt's family member is at the bedside.

## 2020-10-24 NOTE — ED PROVIDER NOTES
SCRIBE #1 NOTE: I, Avila Peterson, am scribing for, and in the presence of, Alee Keyes MD. I have scribed the entire note.       History     Chief Complaint   Patient presents with    Rectal Bleeding     pt reports bloody stool with clots, starting a couple of days ago.      Review of patient's allergies indicates:  No Known Allergies      History of Present Illness     HPI    10/23/2020, 8:31 PM   History obtained from the son and patient      History of Present Illness: Yan Choudhury is a 65 y.o. male patient with a PMHx of hyperlipidemia, HTN, DM, CKD, CAD, and Diverticulitis who presents to the Emergency Department for evaluation of rectal bleeding which onset gradually today. Son reports pt had diarrhea with blood clots and bright red blood and notes he has diverticulitis. Pt notes he had multiple surgeries including a colectomy because of the diverticulitis which he claims he had no abd pain with. Symptoms are constant and moderate in severity. No mitigating or exacerbating factors reported. Associated sxs include diarrhea, SOB and blood in stool. Patient denies any fever, chills, CP, abd pain, N/V, back pain, HA, weakness, and all other sxs at this time. No prior Tx reported. Pt denies history of blood transfusions No further complaints or concerns at this time.        Arrival mode: Personal vehicle     PCP: Sandra Estevez MD        Past Medical History:  Past Medical History:   Diagnosis Date    Arthritis     CAD (coronary artery disease)     Cataract     Chronic combined systolic and diastolic congestive heart failure 3/24/2015    CKD (chronic kidney disease), stage III     Diabetes mellitus type II      AM    Diabetic retinopathy     anti Veg F injections for macular edema    Diverticulitis of colon     ED (erectile dysfunction)     Encounter for blood transfusion     Glaucoma     HTN (hypertension)     Hyperlipidemia     Ischemic cardiomyopathy     Obesity     JAHAIRA on CPAP      Pacemaker     Pulmonary HTN        Past Surgical History:  Past Surgical History:   Procedure Laterality Date    CARDIAC CATHETERIZATION  2009    CHOLECYSTECTOMY      COLON SURGERY      Sigmoid resection    COLONOSCOPY N/A 10/11/2018    Procedure: COLONOSCOPY;  Surgeon: Quinn Aragon MD;  Location: [facility] ENDO;  Service: General;  Laterality: N/A;    EYE SURGERY      FLEXIBLE SIGMOIDOSCOPY N/A 1/18/2019    Procedure: SIGMOIDOSCOPY, FLEXIBLE;  Surgeon: Quinn Aragon MD;  Location: [facility] ENDO;  Service: General;  Laterality: N/A;    ILEOSTOMY N/A 10/30/2018    Procedure: CREATION, ILEOSTOMY;  Surgeon: Kevin Lindsay MD;  Location: [facility] OR;  Service: General;  Laterality: N/A;    Ileostomy reversal  01/2019    KNEE SURGERY      MOBILIZATION OF SPLENIC FLEXURE N/A 10/30/2018    Procedure: MOBILIZATION, SPLENIC FLEXURE;  Surgeon: Kevin Lindsay MD;  Location: [facility] OR;  Service: General;  Laterality: N/A;    REVISION COLOSTOMY N/A 10/30/2018    Procedure: REVISION OR CLOSURE, COLOSTOMY;  Surgeon: Kevin Lindsay MD;  Location: [facility] OR;  Service: General;  Laterality: N/A;  Parastomal Hernia Repair    RIGHT HEMICOLECTOMY Right 10/30/2018    Procedure: HEMICOLECTOMY, RIGHT;  Surgeon: Kevin Lindsay MD;  Location: [facility] OR;  Service: General;  Laterality: Right;         Family History:  Family History   Problem Relation Age of Onset    Cancer Mother     Heart disease Father     Stroke Father     No Known Problems Sister     No Known Problems Brother     No Known Problems Maternal Aunt     No Known Problems Maternal Uncle     No Known Problems Paternal Aunt     No Known Problems Paternal Uncle     No Known Problems Maternal Grandmother     No Known Problems Maternal Grandfather     No Known Problems Paternal Grandmother     No Known Problems Paternal Grandfather     Amblyopia Neg Hx     Blindness Neg Hx     Cataracts Neg Hx     Diabetes Neg Hx     Glaucoma Neg Hx      Hypertension Neg Hx     Macular degeneration Neg Hx     Retinal detachment Neg Hx     Strabismus Neg Hx     Thyroid disease Neg Hx        Social History:  Social History     Tobacco Use    Smoking status: Never Smoker    Smokeless tobacco: Never Used   Substance and Sexual Activity    Alcohol use: No     Alcohol/week: 0.0 standard drinks     Frequency: Never    Drug use: No    Sexual activity: Not Currently        Review of Systems     Review of Systems   Constitutional: Negative for chills and fever.   HENT: Negative for sore throat.    Respiratory: Positive for shortness of breath.    Cardiovascular: Negative for chest pain.   Gastrointestinal: Positive for blood in stool (clots and bright red blood) and diarrhea. Negative for abdominal pain, nausea and vomiting.   Genitourinary: Negative for dysuria.   Musculoskeletal: Negative for back pain.   Skin: Negative for rash.   Neurological: Negative for weakness and headaches.   Hematological: Does not bruise/bleed easily.   All other systems reviewed and are negative.       Physical Exam     Initial Vitals [10/23/20 2022]   BP Pulse Resp Temp SpO2   111/62 74 16 97.5 °F (36.4 °C) 100 %      MAP       --          Physical Exam  Nursing Notes and Vital Signs Reviewed.  Constitutional: Patient is in no acute distress. Well-developed and well-nourished.  Head: Atraumatic. Normocephalic.  Eyes: PERRL. EOM intact. Conjunctivae are not pale. No scleral icterus.  ENT: Mucous membranes are moist. Oropharynx is clear and symmetric.    Neck: Supple. Full ROM. No lymphadenopathy.  Cardiovascular: Regular rate. Regular rhythm. No murmurs, rubs, or gallops. Distal pulses are 2+ and symmetric.  Pulmonary/Chest: No respiratory distress. Clear to auscultation bilaterally. No wheezing or rales.  Abdominal: Soft and non-distended.  There is no tenderness.  No rebound, guarding, or rigidity. Good bowel sounds.  Rectal: Female chaperone present for the duration of the rectal  "exam.There is no tenderness. No masses or external hemorrhoids. Normal sphincter tone. Bright red occult blood with clotting present.   Genitourinary: No CVA tenderness  Musculoskeletal: Moves all extremities. No obvious deformities. No edema. No calf tenderness.  Skin: Warm and dry.  Neurological:  Alert, awake, and appropriate.  Normal speech.  No acute focal neurological deficits are appreciated.  Psychiatric: Normal affect. Good eye contact. Appropriate in content.     ED Course   Procedures  ED Vital Signs:  Vitals:    10/23/20 2022 10/23/20 2045 10/23/20 2100 10/23/20 2130   BP: 111/62 109/68 104/65 106/67   Pulse: 74 75 76 70   Resp: 16 16 18 20   Temp: 97.5 °F (36.4 °C)      TempSrc: Oral      SpO2: 100% 99% 100% 100%   Height: 5' 8" (1.727 m)          Abnormal Lab Results:  Labs Reviewed   CBC W/ AUTO DIFFERENTIAL - Abnormal; Notable for the following components:       Result Value    RBC 3.93 (*)     Hemoglobin 11.0 (*)     Hematocrit 34.5 (*)     Mean Corpuscular Hemoglobin Conc 31.9 (*)     RDW 14.6 (*)     Platelets 114 (*)     All other components within normal limits   COMPREHENSIVE METABOLIC PANEL - Abnormal; Notable for the following components:    Sodium 135 (*)     CO2 22 (*)     Glucose 136 (*)     Creatinine 2.7 (*)     Albumin 3.1 (*)     eGFR if  27 (*)     eGFR if non  23 (*)     All other components within normal limits   B-TYPE NATRIURETIC PEPTIDE - Abnormal; Notable for the following components:    BNP 1,500 (*)     All other components within normal limits   TROPONIN I - Abnormal; Notable for the following components:    Troponin I 0.048 (*)     All other components within normal limits   APTT   PROTIME-INR   TYPE & SCREEN        All Lab Results:  Results for orders placed or performed during the hospital encounter of 10/23/20   CBC auto differential   Result Value Ref Range    WBC 6.80 3.90 - 12.70 K/uL    RBC 3.93 (L) 4.60 - 6.20 M/uL    Hemoglobin 11.0 " (L) 14.0 - 18.0 g/dL    Hematocrit 34.5 (L) 40.0 - 54.0 %    Mean Corpuscular Volume 88 82 - 98 fL    Mean Corpuscular Hemoglobin 28.0 27.0 - 31.0 pg    Mean Corpuscular Hemoglobin Conc 31.9 (L) 32.0 - 36.0 g/dL    RDW 14.6 (H) 11.5 - 14.5 %    Platelets 114 (L) 150 - 350 K/uL    MPV SEE COMMENT 9.2 - 12.9 fL    Immature Granulocytes 0.3 0.0 - 0.5 %    Gran # (ANC) 4.6 1.8 - 7.7 K/uL    Immature Grans (Abs) 0.02 0.00 - 0.04 K/uL    Lymph # 1.7 1.0 - 4.8 K/uL    Mono # 0.4 0.3 - 1.0 K/uL    Eos # 0.1 0.0 - 0.5 K/uL    Baso # 0.03 0.00 - 0.20 K/uL    nRBC 0 0 /100 WBC    Gran% 67.3 38.0 - 73.0 %    Lymph% 24.4 18.0 - 48.0 %    Mono% 6.3 4.0 - 15.0 %    Eosinophil% 1.3 0.0 - 8.0 %    Basophil% 0.4 0.0 - 1.9 %    Differential Method Automated    Comprehensive metabolic panel   Result Value Ref Range    Sodium 135 (L) 136 - 145 mmol/L    Potassium 3.9 3.5 - 5.1 mmol/L    Chloride 104 95 - 110 mmol/L    CO2 22 (L) 23 - 29 mmol/L    Glucose 136 (H) 70 - 110 mg/dL    BUN, Bld 23 8 - 23 mg/dL    Creatinine 2.7 (H) 0.5 - 1.4 mg/dL    Calcium 8.8 8.7 - 10.5 mg/dL    Total Protein 6.8 6.0 - 8.4 g/dL    Albumin 3.1 (L) 3.5 - 5.2 g/dL    Total Bilirubin 0.7 0.1 - 1.0 mg/dL    Alkaline Phosphatase 61 55 - 135 U/L    AST 25 10 - 40 U/L    ALT 12 10 - 44 U/L    Anion Gap 9 8 - 16 mmol/L    eGFR if African American 27 (A) >60 mL/min/1.73 m^2    eGFR if non African American 23 (A) >60 mL/min/1.73 m^2   Brain natriuretic peptide   Result Value Ref Range    BNP 1,500 (H) 0 - 99 pg/mL   Troponin I   Result Value Ref Range    Troponin I 0.048 (H) 0.000 - 0.026 ng/mL     *Note: Due to a large number of results and/or encounters for the requested time period, some results have not been displayed. A complete set of results can be found in Results Review.         Imaging Results:  Imaging Results           CT Abdomen Pelvis  Without Contrast (Final result)  Result time 10/23/20 21:55:56    Final result by Michael Astorga MD (10/23/20  21:55:56)                 Impression:      Mild distal colon diverticulitis as detailed above.    Small right pleural effusion.    Self intending ventral abdominal hernia without findings to suggest small bowel obstruction.    This report was flagged in Epic as abnormal.    All CT scans at this facility are performed  using dose modulation techniques as appropriate to performed exam including the following:  automated exposure control; adjustment of mA and/or kV according to the patients size (this includes techniques or standardized protocols for targeted exams where dose is matched to indication/reason for exam: i.e. extremities or head);  iterative reconstruction technique.      Electronically signed by: Michael Astorga  Date:    10/23/2020  Time:    21:55             Narrative:    EXAMINATION:  CT ABDOMEN PELVIS WITHOUT CONTRAST    CLINICAL HISTORY:  h/o diverticulitis- today hematochezia;    TECHNIQUE:  Low dose axial images, sagittal and coronal reformations were obtained from the lung bases to the pubic symphysis.  Contrast was not administered.    COMPARISON:  CT abdomen pelvis 11/16/2018.    FINDINGS:  Heart: Normal in size. No pericardial effusion.    Lung Bases: Small right pleural effusion.  Bilateral atelectasis.    Liver: Normal in size and attenuation, with no focal hepatic lesions.    Gallbladder: Surgically absent.    Bile Ducts: No evidence of dilated ducts.    Pancreas: No mass or peripancreatic fat stranding.    Spleen: Unremarkable.    Adrenals: Unremarkable.    Kidneys/ Ureters: Unremarkable.    Bladder: No evidence of wall thickening.    Reproductive organs: Unremarkable.    GI Tract/Mesentery: Minimal haziness about a diverticula at the expected location of the descending/sigmoid colon junction in this patient with altered anatomy and prior sigmoid resection.  This is best seen on coronal imaging series 601 images 45 through 48.  No local free-air.  No local free fluid.    Otherwise the bowel  appears unremarkable.  No signs for small bowel obstruction.    Peritoneal Space: No ascites. No free air.    Retroperitoneum: No significant adenopathy.    Abdominal wall: Bowel containing ventral abdominal hernia.    Vasculature: Mild aortoiliac atherosclerosis.    Bones: No acute fracture.                               X-Ray Chest AP Portable (Final result)  Result time 10/23/20 21:31:15    Final result by Michael Astorga MD (10/23/20 21:31:15)                 Impression:      No acute abnormality.      Electronically signed by: Michael Astorga  Date:    10/23/2020  Time:    21:31             Narrative:    EXAMINATION:  XR CHEST AP PORTABLE    CLINICAL HISTORY:  sob;    TECHNIQUE:  Single frontal view of the chest was performed.    COMPARISON:  06/24/2020.    FINDINGS:  Left chest wall port catheter.    The lungs are clear, with normal appearance of pulmonary vasculature and no pleural effusion or pneumothorax.  Resolution of the previously noted pulmonary opacities.    The cardiac silhouette is mildly enlarged.  The hilar and mediastinal contours are unremarkable.    Bones are intact.                                 The EKG was ordered, reviewed, and independently interpreted by the ED provider.  Interpretation time: 2046  Rate: 76 BPM  Rhythm: Sinus rhythm with premature supreventricular complexes with occasional premature ventricular complexes  Interpretation: Nonspecific intraventricular block. Possible right ventricular hypertrophy. No STEMI.             The Emergency Provider reviewed the vital signs and test results, which are outlined above.    ED Course as of Oct 23 2219   Fri Oct 23, 2020   2207  This is  below patient's normal elevated troponin baseline.  No chest pain at this time   Troponin I(!): 0.048 [EDE]      ED Course User Index  [EDE] Alee Keyes MD        ED Discussion     10:12 PM: Reassessed pt at this time.  Pt states his condition has improved at this time. Pt notes he is no longer SOB.  Discussed with pt all pertinent ED information and results. Discussed pt dx and plan of tx. Gave pt all f/u and return to the ED instructions. All questions and concerns were addressed at this time. Pt expresses understanding of information and instructions, and is comfortable with plan to discharge. Pt is stable for discharge.    I discussed with patient and/or family/caretaker that evaluation in the ED does not suggest any emergent or life threatening medical conditions requiring immediate intervention beyond what was provided in the ED, and I believe patient is safe for discharge.  Regardless, an unremarkable evaluation in the ED does not preclude the development or presence of a serious of life threatening condition. As such, patient was instructed to return immediately for any worsening or change in current symptoms.      Please follow-up with primary care physician tomorrow.  If you feel short of breath, chest pain or have increased loss of blood per rectum, come back to the emergency department for recheck of hemoglobin.  Take antibiotics as prescribed and until completion.  Also come back if you have abdominal pain or fevers.     Regarding DIVERTICULITIS, I discussed signs and symptoms common with the condition such as: abdominal pain, nausea, vomiting, fever, chills, change in bowel habits (new onset diarrhea or constipation), or a feeling of incomplete evacuations. I educated patient that diverticulitis occurs when inflammation is superimposed on diverticulosis (presence of protrusions or outpouchings of the lining of the colon) and that the condition has a good prognosis when treated with antibiotics and bowel rest.  Regarding treatment, I recommended that the patient: eat a low-fiber diet or a liquid diet to allow the bowel a chance to recover; take all medications as prescribed; monitor temperature and report any rising temperature to healthcare provider; drink at least 6 to 8 glasses of water every  day; use a heating pad or hot water bottle to reduce abdominal pain or cramping.  For prevention, I recommended that the patient: eat a high-fiber diet to add bulk to the stool so that it passes through the large intestine more easily; keep drinking 6 to 8 glasses of water every day, unless directed otherwise; begin an exercise program; treat diarrhea with a bland diet (start with liquids only, then slowly add fiber over time); watch for changes in bowel movements (constipation to diarrhea); and get plenty of rest and sleep.  Contact primary care provider for any fever above 100°F (37.7°C), chills, abdominal cramps, nausea, vomiting, or rectal bleeding.     ED Medication(s):  Medications   piperacillin-tazobactam 4.5 g in dextrose 5 % 100 mL IVPB (ready to mix system) (has no administration in time range)       New Prescriptions    AMOXICILLIN-CLAVULANATE 875-125MG (AUGMENTIN) 875-125 MG PER TABLET    Take 1 tablet by mouth 2 (two) times daily. for 10 days       Follow-up Information     Sandra Estevez MD. Schedule an appointment as soon as possible for a visit in 3 days.    Specialty: Family Medicine  Contact information:  [address]  [place] [address] [phone]                         Medical Decision Making:   Clinical Tests:   Lab Tests: Ordered and Reviewed  Radiological Study: Ordered and Reviewed  Medical Tests: Ordered and Reviewed             Scribe Attestation:   Scribe #1: I performed the above scribed service and the documentation accurately describes the services I performed. I attest to the accuracy of the note.     Attending:   Physician Attestation Statement for Scribe #1: I, Alee Keyes MD, personally performed the services described in this documentation, as scribed by Avila Peterson, in my presence, and it is both accurate and complete.           Clinical Impression       ICD-10-CM ICD-9-CM   1. Diverticulitis  K57.92 562.11   2. Rectal bleed  K62.5 569.3       Disposition:  "  Disposition: Discharged  Condition: Stable    Portions of this note may have been created with voice recognition software. Occasional "wrong-word" or "sound-a-like" substitutions may have occurred due to the inherent limitations of voice recognition software. Please, read the note carefully and recognize, using context, where substitutions have occurred.          Alee Keyes MD  10/24/20 0042    "

## 2020-11-02 ENCOUNTER — TELEPHONE (OUTPATIENT)
Dept: CARDIOLOGY | Facility: CLINIC | Age: 65
End: 2020-11-02

## 2020-11-02 NOTE — TELEPHONE ENCOUNTER
----- Message from MANPREET Rosas sent at 11/2/2020  3:41 PM CST -----  Please do CHF symptom check and verify diuretic dose. Looks like his dose may need to be adjusted for a few days.     Will monitor the 14 second PAF for now since not sustained .  ----- Message -----  From: Alee Murphy RN  Sent: 11/2/2020   8:08 AM CST  To: Daniel Graham MD, #    FYI - alert notification from [facility] regarding possible fluid build up.    CRTD Report    Last Reset 9/22/2020    BP: 99%    1 AMS episode. 10/16/2020, 14 sec duration, avg a/v rate 213/81bpm. EGM illustrates paf.     Corvue impedance above threshold for 17 day. Suggestive of decompensation.

## 2020-11-03 ENCOUNTER — TELEPHONE (OUTPATIENT)
Dept: CARDIOLOGY | Facility: CLINIC | Age: 65
End: 2020-11-03

## 2020-11-03 NOTE — TELEPHONE ENCOUNTER
----- Message from Nneka Edmond sent at 11/3/2020  1:54 PM CST -----  Regarding: PLEASE CALL BACK  Contact: WIFE LYNDSAY  CONCERNING PATIENT @ [phone]. FUNMI, CELL PHONE ACTING UP. THANKS

## 2020-11-03 NOTE — TELEPHONE ENCOUNTER
I spoke with pt. He reports No LH /dizziness  bp running 110/59 p -68-73bpm  No sob or swelling  Denies any cp  No pnd or orthopnea  I let him know I would notify the provider with report

## 2020-12-11 ENCOUNTER — PATIENT MESSAGE (OUTPATIENT)
Dept: OTHER | Facility: OTHER | Age: 65
End: 2020-12-11

## 2020-12-17 ENCOUNTER — TELEPHONE (OUTPATIENT)
Dept: INTERNAL MEDICINE | Facility: CLINIC | Age: 65
End: 2020-12-17

## 2020-12-17 NOTE — TELEPHONE ENCOUNTER
----- Message from Sandra Estevez MD sent at 12/16/2020  6:34 PM CST -----  Contact: pt  Please advise patient to take over-the-counter Benadryl as needed for itching    Dr. Estevez  ----- Message -----  From: Burak Little MA  Sent: 12/16/2020   9:50 AM CST  To: Sandra Estevez MD    Pt is requesting medication for itching. Please Advise.  ----- Message -----  From: Halley Henriquez  Sent: 12/16/2020   9:35 AM CST  To: [facility] Staff    Type:  Needs Medical Advice    Who Called: pt  Symptoms (please be specific): itching (all over his body)   How long has patient had these symptoms:  n/a  Pharmacy name and phone #:  [facility] [address]  Would the patient rather a call back or a response via [facility]? Call back  Best Call Back Number: [phone]  Additional Information: Please call a med into the pt pharmacy.

## 2020-12-28 ENCOUNTER — CLINICAL SUPPORT (OUTPATIENT)
Dept: CARDIOLOGY | Facility: HOSPITAL | Age: 65
End: 2020-12-28
Payer: MEDICARE

## 2020-12-28 DIAGNOSIS — Z95.810 PRESENCE OF AUTOMATIC (IMPLANTABLE) CARDIAC DEFIBRILLATOR: ICD-10-CM

## 2020-12-28 PROCEDURE — 93295 CARDIAC DEVICE CHECK - REMOTE: ICD-10-PCS | Mod: ,,, | Performed by: INTERNAL MEDICINE

## 2020-12-28 PROCEDURE — 93295 DEV INTERROG REMOTE 1/2/MLT: CPT | Mod: ,,, | Performed by: INTERNAL MEDICINE

## 2020-12-28 PROCEDURE — 93296 REM INTERROG EVL PM/IDS: CPT | Mod: PBBFAC | Performed by: INTERNAL MEDICINE

## 2021-01-13 DIAGNOSIS — Z95.810 ICD (IMPLANTABLE CARDIOVERTER-DEFIBRILLATOR) IN PLACE: Primary | ICD-10-CM

## 2021-01-13 DIAGNOSIS — I50.42 CHRONIC COMBINED SYSTOLIC AND DIASTOLIC CONGESTIVE HEART FAILURE: ICD-10-CM

## 2021-01-13 DIAGNOSIS — I25.5 ISCHEMIC CARDIOMYOPATHY: ICD-10-CM

## 2021-01-25 DIAGNOSIS — E55.9 VITAMIN D DEFICIENCY: ICD-10-CM

## 2021-01-25 DIAGNOSIS — I15.2 HYPERTENSION ASSOCIATED WITH DIABETES: Primary | ICD-10-CM

## 2021-01-25 DIAGNOSIS — E11.59 HYPERTENSION ASSOCIATED WITH DIABETES: Primary | ICD-10-CM

## 2021-02-03 ENCOUNTER — PATIENT OUTREACH (OUTPATIENT)
Dept: ADMINISTRATIVE | Facility: HOSPITAL | Age: 66
End: 2021-02-03

## 2021-02-12 ENCOUNTER — LAB VISIT (OUTPATIENT)
Dept: LAB | Facility: HOSPITAL | Age: 66
End: 2021-02-12
Attending: INTERNAL MEDICINE
Payer: MEDICARE

## 2021-02-12 DIAGNOSIS — N25.81 HYPERPARATHYROIDISM, SECONDARY RENAL: ICD-10-CM

## 2021-02-12 DIAGNOSIS — N18.4 CKD (CHRONIC KIDNEY DISEASE) STAGE 4, GFR 15-29 ML/MIN: ICD-10-CM

## 2021-02-12 LAB
ALBUMIN SERPL BCP-MCNC: 2.9 G/DL (ref 3.5–5.2)
ANION GAP SERPL CALC-SCNC: 5 MMOL/L (ref 8–16)
BASOPHILS # BLD AUTO: 0.03 K/UL (ref 0–0.2)
BASOPHILS NFR BLD: 0.5 % (ref 0–1.9)
BUN SERPL-MCNC: 28 MG/DL (ref 8–23)
CALCIUM SERPL-MCNC: 8.8 MG/DL (ref 8.7–10.5)
CHLORIDE SERPL-SCNC: 103 MMOL/L (ref 95–110)
CO2 SERPL-SCNC: 31 MMOL/L (ref 23–29)
CREAT SERPL-MCNC: 2 MG/DL (ref 0.5–1.4)
DIFFERENTIAL METHOD: ABNORMAL
EOSINOPHIL # BLD AUTO: 0.2 K/UL (ref 0–0.5)
EOSINOPHIL NFR BLD: 3.2 % (ref 0–8)
ERYTHROCYTE [DISTWIDTH] IN BLOOD BY AUTOMATED COUNT: 21.8 % (ref 11.5–14.5)
EST. GFR  (AFRICAN AMERICAN): 39 ML/MIN/1.73 M^2
EST. GFR  (NON AFRICAN AMERICAN): 34 ML/MIN/1.73 M^2
GLUCOSE SERPL-MCNC: 110 MG/DL (ref 70–110)
HCT VFR BLD AUTO: 33.5 % (ref 40–54)
HGB BLD-MCNC: 9.3 G/DL (ref 14–18)
IMM GRANULOCYTES # BLD AUTO: 0.01 K/UL (ref 0–0.04)
IMM GRANULOCYTES NFR BLD AUTO: 0.2 % (ref 0–0.5)
LYMPHOCYTES # BLD AUTO: 1.4 K/UL (ref 1–4.8)
LYMPHOCYTES NFR BLD: 22.3 % (ref 18–48)
MCH RBC QN AUTO: 20.7 PG (ref 27–31)
MCHC RBC AUTO-ENTMCNC: 27.8 G/DL (ref 32–36)
MCV RBC AUTO: 74 FL (ref 82–98)
MONOCYTES # BLD AUTO: 0.7 K/UL (ref 0.3–1)
MONOCYTES NFR BLD: 11.5 % (ref 4–15)
NEUTROPHILS # BLD AUTO: 3.9 K/UL (ref 1.8–7.7)
NEUTROPHILS NFR BLD: 62.3 % (ref 38–73)
NRBC BLD-RTO: 0 /100 WBC
PHOSPHATE SERPL-MCNC: 2.2 MG/DL (ref 2.7–4.5)
PLATELET # BLD AUTO: 150 K/UL (ref 150–350)
PMV BLD AUTO: ABNORMAL FL (ref 9.2–12.9)
POTASSIUM SERPL-SCNC: 4 MMOL/L (ref 3.5–5.1)
PTH-INTACT SERPL-MCNC: 108 PG/ML (ref 9–77)
RBC # BLD AUTO: 4.5 M/UL (ref 4.6–6.2)
SODIUM SERPL-SCNC: 139 MMOL/L (ref 136–145)
WBC # BLD AUTO: 6.27 K/UL (ref 3.9–12.7)

## 2021-02-12 PROCEDURE — 85025 COMPLETE CBC W/AUTO DIFF WBC: CPT

## 2021-02-12 PROCEDURE — 80069 RENAL FUNCTION PANEL: CPT

## 2021-02-12 PROCEDURE — 36415 COLL VENOUS BLD VENIPUNCTURE: CPT

## 2021-02-12 PROCEDURE — 83970 ASSAY OF PARATHORMONE: CPT

## 2021-02-15 ENCOUNTER — PATIENT OUTREACH (OUTPATIENT)
Dept: ADMINISTRATIVE | Facility: OTHER | Age: 66
End: 2021-02-15

## 2021-02-17 ENCOUNTER — PES CALL (OUTPATIENT)
Dept: ADMINISTRATIVE | Facility: CLINIC | Age: 66
End: 2021-02-17

## 2021-02-23 ENCOUNTER — OFFICE VISIT (OUTPATIENT)
Dept: DERMATOLOGY | Facility: CLINIC | Age: 66
End: 2021-02-23
Payer: MEDICARE

## 2021-02-23 DIAGNOSIS — L29.9 PRURITUS: Primary | ICD-10-CM

## 2021-02-23 DIAGNOSIS — L85.3 XEROSIS CUTIS: ICD-10-CM

## 2021-02-23 PROCEDURE — 99214 PR OFFICE/OUTPT VISIT, EST, LEVL IV, 30-39 MIN: ICD-10-PCS | Mod: S$PBB,,, | Performed by: STUDENT IN AN ORGANIZED HEALTH CARE EDUCATION/TRAINING PROGRAM

## 2021-02-23 PROCEDURE — 99999 PR PBB SHADOW E&M-EST. PATIENT-LVL III: CPT | Mod: PBBFAC,,, | Performed by: STUDENT IN AN ORGANIZED HEALTH CARE EDUCATION/TRAINING PROGRAM

## 2021-02-23 PROCEDURE — 99213 OFFICE O/P EST LOW 20 MIN: CPT | Mod: PBBFAC | Performed by: STUDENT IN AN ORGANIZED HEALTH CARE EDUCATION/TRAINING PROGRAM

## 2021-02-23 PROCEDURE — 99214 OFFICE O/P EST MOD 30 MIN: CPT | Mod: S$PBB,,, | Performed by: STUDENT IN AN ORGANIZED HEALTH CARE EDUCATION/TRAINING PROGRAM

## 2021-02-23 PROCEDURE — 99999 PR PBB SHADOW E&M-EST. PATIENT-LVL III: ICD-10-PCS | Mod: PBBFAC,,, | Performed by: STUDENT IN AN ORGANIZED HEALTH CARE EDUCATION/TRAINING PROGRAM

## 2021-02-23 RX ORDER — TRIAMCINOLONE ACETONIDE 1 MG/G
OINTMENT TOPICAL 2 TIMES DAILY
Qty: 454 G | Refills: 2 | Status: SHIPPED | OUTPATIENT
Start: 2021-02-23

## 2021-02-23 RX ORDER — PERMETHRIN 50 MG/G
CREAM TOPICAL
COMMUNITY
Start: 2021-02-21 | End: 2021-03-24

## 2021-03-10 ENCOUNTER — OFFICE VISIT (OUTPATIENT)
Dept: CARDIOLOGY | Facility: CLINIC | Age: 66
End: 2021-03-10
Attending: INTERNAL MEDICINE
Payer: MEDICARE

## 2021-03-10 ENCOUNTER — OFFICE VISIT (OUTPATIENT)
Dept: PODIATRY | Facility: CLINIC | Age: 66
End: 2021-03-10
Payer: MEDICARE

## 2021-03-10 ENCOUNTER — HOSPITAL ENCOUNTER (OUTPATIENT)
Dept: CARDIOLOGY | Facility: HOSPITAL | Age: 66
Discharge: HOME | End: 2021-03-10
Attending: INTERNAL MEDICINE
Payer: MEDICARE

## 2021-03-10 VITALS — HEIGHT: 68 IN | WEIGHT: 201.5 LBS | BODY MASS INDEX: 30.54 KG/M2

## 2021-03-10 VITALS
SYSTOLIC BLOOD PRESSURE: 116 MMHG | HEIGHT: 68 IN | BODY MASS INDEX: 30.54 KG/M2 | DIASTOLIC BLOOD PRESSURE: 70 MMHG | WEIGHT: 201.5 LBS | HEART RATE: 77 BPM

## 2021-03-10 DIAGNOSIS — I87.2 VENOUS INSUFFICIENCY OF BOTH LOWER EXTREMITIES: ICD-10-CM

## 2021-03-10 DIAGNOSIS — L97.922 DIABETIC ULCER OF LEFT LOWER LEG ASSOCIATED WITH TYPE 2 DIABETES MELLITUS, WITH FAT LAYER EXPOSED: ICD-10-CM

## 2021-03-10 DIAGNOSIS — Z95.810 ICD (IMPLANTABLE CARDIOVERTER-DEFIBRILLATOR) IN PLACE: ICD-10-CM

## 2021-03-10 DIAGNOSIS — L97.529 DIABETIC ULCER OF TOE OF LEFT FOOT ASSOCIATED WITH DIABETES MELLITUS DUE TO UNDERLYING CONDITION, UNSPECIFIED ULCER STAGE: ICD-10-CM

## 2021-03-10 DIAGNOSIS — E11.51 TYPE 2 DIABETES MELLITUS WITH ATHEROSCLEROSIS OF NATIVE ARTERIES OF EXTREMITY WITH INTERMITTENT CLAUDICATION: ICD-10-CM

## 2021-03-10 DIAGNOSIS — E11.51 TYPE 2 DIABETES MELLITUS WITH DIABETIC PERIPHERAL ANGIOPATHY WITHOUT GANGRENE, WITH LONG-TERM CURRENT USE OF INSULIN: ICD-10-CM

## 2021-03-10 DIAGNOSIS — N18.32 TYPE 2 DIABETES MELLITUS WITH STAGE 3B CHRONIC KIDNEY DISEASE, WITH LONG-TERM CURRENT USE OF INSULIN: ICD-10-CM

## 2021-03-10 DIAGNOSIS — I25.5 ISCHEMIC CARDIOMYOPATHY: ICD-10-CM

## 2021-03-10 DIAGNOSIS — I70.229 CRITICAL LOWER LIMB ISCHEMIA: Primary | ICD-10-CM

## 2021-03-10 DIAGNOSIS — Z79.4 TYPE 2 DIABETES MELLITUS WITH STAGE 3B CHRONIC KIDNEY DISEASE, WITH LONG-TERM CURRENT USE OF INSULIN: ICD-10-CM

## 2021-03-10 DIAGNOSIS — I50.42 CHRONIC COMBINED SYSTOLIC AND DIASTOLIC CONGESTIVE HEART FAILURE: ICD-10-CM

## 2021-03-10 DIAGNOSIS — E11.621 DIABETIC ULCER OF TOE OF RIGHT FOOT ASSOCIATED WITH TYPE 2 DIABETES MELLITUS, UNSPECIFIED ULCER STAGE: ICD-10-CM

## 2021-03-10 DIAGNOSIS — L97.912 DIABETIC ULCER OF RIGHT LOWER LEG ASSOCIATED WITH TYPE 2 DIABETES MELLITUS, WITH FAT LAYER EXPOSED: ICD-10-CM

## 2021-03-10 DIAGNOSIS — Z95.810 BIVENTRICULAR ICD (IMPLANTABLE CARDIOVERTER-DEFIBRILLATOR) IN PLACE: Primary | Chronic | ICD-10-CM

## 2021-03-10 DIAGNOSIS — I70.219 TYPE 2 DIABETES MELLITUS WITH ATHEROSCLEROSIS OF NATIVE ARTERIES OF EXTREMITY WITH INTERMITTENT CLAUDICATION: ICD-10-CM

## 2021-03-10 DIAGNOSIS — L97.519 DIABETIC ULCER OF TOE OF RIGHT FOOT ASSOCIATED WITH TYPE 2 DIABETES MELLITUS, UNSPECIFIED ULCER STAGE: ICD-10-CM

## 2021-03-10 DIAGNOSIS — E08.622 DIABETIC ULCER OF RIGHT ANKLE ASSOCIATED WITH DIABETES MELLITUS DUE TO UNDERLYING CONDITION, WITH FAT LAYER EXPOSED: ICD-10-CM

## 2021-03-10 DIAGNOSIS — L97.312 DIABETIC ULCER OF RIGHT ANKLE ASSOCIATED WITH DIABETES MELLITUS DUE TO UNDERLYING CONDITION, WITH FAT LAYER EXPOSED: ICD-10-CM

## 2021-03-10 DIAGNOSIS — E11.622 DIABETIC ULCER OF LEFT LOWER LEG ASSOCIATED WITH TYPE 2 DIABETES MELLITUS, WITH FAT LAYER EXPOSED: ICD-10-CM

## 2021-03-10 DIAGNOSIS — Z79.4 TYPE 2 DIABETES MELLITUS WITH DIABETIC PERIPHERAL ANGIOPATHY WITHOUT GANGRENE, WITH LONG-TERM CURRENT USE OF INSULIN: ICD-10-CM

## 2021-03-10 DIAGNOSIS — E11.22 TYPE 2 DIABETES MELLITUS WITH STAGE 3B CHRONIC KIDNEY DISEASE, WITH LONG-TERM CURRENT USE OF INSULIN: ICD-10-CM

## 2021-03-10 DIAGNOSIS — L97.322 DIABETIC ULCER OF LEFT ANKLE ASSOCIATED WITH DIABETES MELLITUS DUE TO UNDERLYING CONDITION, WITH FAT LAYER EXPOSED: ICD-10-CM

## 2021-03-10 DIAGNOSIS — E08.621 DIABETIC ULCER OF TOE OF LEFT FOOT ASSOCIATED WITH DIABETES MELLITUS DUE TO UNDERLYING CONDITION, UNSPECIFIED ULCER STAGE: ICD-10-CM

## 2021-03-10 DIAGNOSIS — E11.622 DIABETIC ULCER OF RIGHT LOWER LEG ASSOCIATED WITH TYPE 2 DIABETES MELLITUS, WITH FAT LAYER EXPOSED: ICD-10-CM

## 2021-03-10 DIAGNOSIS — E08.622 DIABETIC ULCER OF LEFT ANKLE ASSOCIATED WITH DIABETES MELLITUS DUE TO UNDERLYING CONDITION, WITH FAT LAYER EXPOSED: ICD-10-CM

## 2021-03-10 PROCEDURE — 99999 PR PBB SHADOW E&M-EST. PATIENT-LVL IV: CPT | Mod: PBBFAC,,, | Performed by: PODIATRIST

## 2021-03-10 PROCEDURE — 99999 PR PBB SHADOW E&M-EST. PATIENT-LVL IV: CPT | Mod: PBBFAC,,, | Performed by: INTERNAL MEDICINE

## 2021-03-10 PROCEDURE — 99205 PR OFFICE/OUTPT VISIT, NEW, LEVL V, 60-74 MIN: ICD-10-PCS | Mod: S$PBB,,, | Performed by: PODIATRIST

## 2021-03-10 PROCEDURE — 99214 PR OFFICE/OUTPT VISIT, EST, LEVL IV, 30-39 MIN: ICD-10-PCS | Mod: S$PBB,,, | Performed by: INTERNAL MEDICINE

## 2021-03-10 PROCEDURE — 99999 PR PBB SHADOW E&M-EST. PATIENT-LVL IV: ICD-10-PCS | Mod: PBBFAC,,, | Performed by: INTERNAL MEDICINE

## 2021-03-10 PROCEDURE — 99214 OFFICE O/P EST MOD 30 MIN: CPT | Mod: PBBFAC,25,27 | Performed by: PODIATRIST

## 2021-03-10 PROCEDURE — 99214 OFFICE O/P EST MOD 30 MIN: CPT | Mod: S$PBB,,, | Performed by: INTERNAL MEDICINE

## 2021-03-10 PROCEDURE — 99999 PR PBB SHADOW E&M-EST. PATIENT-LVL IV: ICD-10-PCS | Mod: PBBFAC,,, | Performed by: PODIATRIST

## 2021-03-10 PROCEDURE — 99205 OFFICE O/P NEW HI 60 MIN: CPT | Mod: S$PBB,,, | Performed by: PODIATRIST

## 2021-03-10 PROCEDURE — 99214 OFFICE O/P EST MOD 30 MIN: CPT | Mod: PBBFAC,25 | Performed by: INTERNAL MEDICINE

## 2021-03-10 PROCEDURE — 93284 PRGRMG EVAL IMPLANTABLE DFB: CPT | Mod: 26,,, | Performed by: INTERNAL MEDICINE

## 2021-03-10 PROCEDURE — 93284 PRGRMG EVAL IMPLANTABLE DFB: CPT

## 2021-03-10 PROCEDURE — 93284 CARDIAC DEVICE CHECK - IN CLINIC & HOSPITAL: ICD-10-PCS | Mod: 26,,, | Performed by: INTERNAL MEDICINE

## 2021-03-11 ENCOUNTER — HOSPITAL ENCOUNTER (EMERGENCY)
Facility: HOSPITAL | Age: 66
Discharge: HOME | End: 2021-03-11
Attending: EMERGENCY MEDICINE
Payer: MEDICARE

## 2021-03-11 VITALS
TEMPERATURE: 98 F | DIASTOLIC BLOOD PRESSURE: 101 MMHG | RESPIRATION RATE: 18 BRPM | BODY MASS INDEX: 31.67 KG/M2 | SYSTOLIC BLOOD PRESSURE: 154 MMHG | WEIGHT: 209 LBS | OXYGEN SATURATION: 100 % | HEART RATE: 98 BPM | HEIGHT: 68 IN

## 2021-03-11 DIAGNOSIS — I73.9 CLAUDICATION: ICD-10-CM

## 2021-03-11 DIAGNOSIS — S81.801A LEG WOUND, RIGHT, INITIAL ENCOUNTER: ICD-10-CM

## 2021-03-11 DIAGNOSIS — S81.802A WOUND OF LEFT LOWER EXTREMITY, INITIAL ENCOUNTER: Primary | ICD-10-CM

## 2021-03-11 LAB
AV DELAY - LONGEST: 160 MSEC
AV DELAY - SHORTEST: 110 MSEC
CHARGE TIME (SEC): 10 SEC
HV IMPEDANCE (OHM): 42 OHM
IMPEDANCE RA LEAD (DONOR): 760 OHMS
IMPEDANCE RA LEAD (NATIVE): 400 OHMS
IMPEDANCE RA LEAD: 310 OHMS
OHS CV DC PP MS1: 0.5 MS
OHS CV DC PP MS2: 0.5 MS
OHS CV DC PP MS3: 0.5 MS
OHS CV DC PP V1: NORMAL V
OHS CV DC PP V2: NORMAL V
OHS CV DC PP V3: NORMAL V
P/R-WAVE RA LEAD (NATIVE): 11.9 MV
P/R-WAVE RA LEAD: 4.1 MV
THRESHOLD MS RA LEAD (DONOR): 0.5 MS
THRESHOLD MS RA LEAD (NATIVE): 0.5 MS
THRESHOLD MS RA LEAD: 0.5 MS
THRESHOLD V RA LEAD (DONOR): 0.75 V
THRESHOLD V RA LEAD (NATIVE): 0.5 V
THRESHOLD V RA LEAD: 1 V
VV DELAY: 15 MSEC

## 2021-03-11 PROCEDURE — 99284 EMERGENCY DEPT VISIT MOD MDM: CPT | Mod: 25

## 2021-03-12 ENCOUNTER — TELEPHONE (OUTPATIENT)
Dept: CARDIOLOGY | Facility: CLINIC | Age: 66
End: 2021-03-12

## 2021-03-14 PROBLEM — I73.9 PAD (PERIPHERAL ARTERY DISEASE): Status: ACTIVE | Noted: 2021-03-14

## 2021-03-14 PROBLEM — I50.23 ACUTE ON CHRONIC SYSTOLIC HEART FAILURE: Status: RESOLVED | Noted: 2020-05-05 | Resolved: 2021-03-14

## 2021-03-15 ENCOUNTER — OFFICE VISIT (OUTPATIENT)
Dept: CARDIOLOGY | Facility: CLINIC | Age: 66
End: 2021-03-15
Payer: MEDICARE

## 2021-03-15 VITALS
HEART RATE: 90 BPM | SYSTOLIC BLOOD PRESSURE: 120 MMHG | HEIGHT: 68 IN | DIASTOLIC BLOOD PRESSURE: 80 MMHG | OXYGEN SATURATION: 99 % | BODY MASS INDEX: 31.78 KG/M2

## 2021-03-15 DIAGNOSIS — I50.42 CHRONIC COMBINED SYSTOLIC AND DIASTOLIC CONGESTIVE HEART FAILURE: ICD-10-CM

## 2021-03-15 DIAGNOSIS — I25.5 ISCHEMIC CARDIOMYOPATHY: ICD-10-CM

## 2021-03-15 DIAGNOSIS — I47.29 NSVT (NONSUSTAINED VENTRICULAR TACHYCARDIA): ICD-10-CM

## 2021-03-15 DIAGNOSIS — R94.31 ABNORMAL ECG: ICD-10-CM

## 2021-03-15 DIAGNOSIS — E11.59 HYPERTENSION ASSOCIATED WITH DIABETES: ICD-10-CM

## 2021-03-15 DIAGNOSIS — I35.1 NONRHEUMATIC AORTIC VALVE INSUFFICIENCY: ICD-10-CM

## 2021-03-15 DIAGNOSIS — I15.2 HYPERTENSION ASSOCIATED WITH DIABETES: ICD-10-CM

## 2021-03-15 DIAGNOSIS — E11.69 HYPERLIPIDEMIA ASSOCIATED WITH TYPE 2 DIABETES MELLITUS: ICD-10-CM

## 2021-03-15 DIAGNOSIS — N18.32 TYPE 2 DIABETES MELLITUS WITH STAGE 3B CHRONIC KIDNEY DISEASE, WITH LONG-TERM CURRENT USE OF INSULIN: ICD-10-CM

## 2021-03-15 DIAGNOSIS — R94.39 ABNORMAL STRESS TEST: ICD-10-CM

## 2021-03-15 DIAGNOSIS — I73.9 PAD (PERIPHERAL ARTERY DISEASE): Primary | ICD-10-CM

## 2021-03-15 DIAGNOSIS — E66.01 CLASS 2 SEVERE OBESITY DUE TO EXCESS CALORIES WITH SERIOUS COMORBIDITY AND BODY MASS INDEX (BMI) OF 35.0 TO 35.9 IN ADULT: ICD-10-CM

## 2021-03-15 DIAGNOSIS — E11.22 TYPE 2 DIABETES MELLITUS WITH STAGE 3B CHRONIC KIDNEY DISEASE, WITH LONG-TERM CURRENT USE OF INSULIN: ICD-10-CM

## 2021-03-15 DIAGNOSIS — I44.7 LBBB (LEFT BUNDLE BRANCH BLOCK): ICD-10-CM

## 2021-03-15 DIAGNOSIS — E78.5 HYPERLIPIDEMIA ASSOCIATED WITH TYPE 2 DIABETES MELLITUS: ICD-10-CM

## 2021-03-15 DIAGNOSIS — Z95.810 BIVENTRICULAR ICD (IMPLANTABLE CARDIOVERTER-DEFIBRILLATOR) IN PLACE: Chronic | ICD-10-CM

## 2021-03-15 DIAGNOSIS — I25.9 CHRONIC ISCHEMIC HEART DISEASE: ICD-10-CM

## 2021-03-15 DIAGNOSIS — G47.34 NOCTURNAL HYPOXEMIA: ICD-10-CM

## 2021-03-15 DIAGNOSIS — I25.10 CAD, MULTIPLE VESSEL: ICD-10-CM

## 2021-03-15 DIAGNOSIS — I27.20 PULMONARY HTN: ICD-10-CM

## 2021-03-15 DIAGNOSIS — Z79.4 TYPE 2 DIABETES MELLITUS WITH STAGE 3B CHRONIC KIDNEY DISEASE, WITH LONG-TERM CURRENT USE OF INSULIN: ICD-10-CM

## 2021-03-15 PROCEDURE — 99215 PR OFFICE/OUTPT VISIT, EST, LEVL V, 40-54 MIN: ICD-10-PCS | Mod: S$PBB,,, | Performed by: INTERNAL MEDICINE

## 2021-03-15 PROCEDURE — 99999 PR PBB SHADOW E&M-EST. PATIENT-LVL III: ICD-10-PCS | Mod: PBBFAC,,, | Performed by: INTERNAL MEDICINE

## 2021-03-15 PROCEDURE — 99213 OFFICE O/P EST LOW 20 MIN: CPT | Mod: PBBFAC | Performed by: INTERNAL MEDICINE

## 2021-03-15 PROCEDURE — 99215 OFFICE O/P EST HI 40 MIN: CPT | Mod: S$PBB,,, | Performed by: INTERNAL MEDICINE

## 2021-03-15 PROCEDURE — 99999 PR PBB SHADOW E&M-EST. PATIENT-LVL III: CPT | Mod: PBBFAC,,, | Performed by: INTERNAL MEDICINE

## 2021-03-16 ENCOUNTER — TELEPHONE (OUTPATIENT)
Dept: VASCULAR SURGERY | Facility: CLINIC | Age: 66
End: 2021-03-16

## 2021-03-19 ENCOUNTER — HOSPITAL ENCOUNTER (OUTPATIENT)
Facility: HOSPITAL | Age: 66
Discharge: HOME | End: 2021-03-20
Attending: SURGERY | Admitting: INTERNAL MEDICINE
Payer: MEDICARE

## 2021-03-19 DIAGNOSIS — I70.219 ATHEROSCLEROTIC PVD WITH INTERMITTENT CLAUDICATION: ICD-10-CM

## 2021-03-19 DIAGNOSIS — I73.9 PAD (PERIPHERAL ARTERY DISEASE): ICD-10-CM

## 2021-03-19 DIAGNOSIS — E11.3292 CONTROLLED TYPE 2 DIABETES MELLITUS WITH LEFT EYE AFFECTED BY MILD NONPROLIFERATIVE RETINOPATHY WITHOUT MACULAR EDEMA, WITH LONG-TERM CURRENT USE OF INSULIN: Primary | ICD-10-CM

## 2021-03-19 DIAGNOSIS — Z79.4 CONTROLLED TYPE 2 DIABETES MELLITUS WITH LEFT EYE AFFECTED BY MILD NONPROLIFERATIVE RETINOPATHY WITHOUT MACULAR EDEMA, WITH LONG-TERM CURRENT USE OF INSULIN: Primary | ICD-10-CM

## 2021-03-19 LAB
ALBUMIN SERPL BCP-MCNC: 2.6 G/DL (ref 3.5–5.2)
ALP SERPL-CCNC: 68 U/L (ref 55–135)
ALT SERPL W/O P-5'-P-CCNC: 16 U/L (ref 10–44)
ANION GAP SERPL CALC-SCNC: 11 MMOL/L (ref 8–16)
ANISOCYTOSIS BLD QL SMEAR: SLIGHT
AST SERPL-CCNC: 30 U/L (ref 10–40)
BASOPHILS # BLD AUTO: 0.03 K/UL (ref 0–0.2)
BASOPHILS NFR BLD: 0.4 % (ref 0–1.9)
BILIRUB SERPL-MCNC: 1 MG/DL (ref 0.1–1)
BUN SERPL-MCNC: 46 MG/DL (ref 8–23)
BURR CELLS BLD QL SMEAR: ABNORMAL
CALCIUM SERPL-MCNC: 8.5 MG/DL (ref 8.7–10.5)
CHLORIDE SERPL-SCNC: 106 MMOL/L (ref 95–110)
CO2 SERPL-SCNC: 21 MMOL/L (ref 23–29)
CREAT SERPL-MCNC: 2.6 MG/DL (ref 0.5–1.4)
DACRYOCYTES BLD QL SMEAR: ABNORMAL
DIFFERENTIAL METHOD: ABNORMAL
EOSINOPHIL # BLD AUTO: 0.1 K/UL (ref 0–0.5)
EOSINOPHIL NFR BLD: 2.1 % (ref 0–8)
ERYTHROCYTE [DISTWIDTH] IN BLOOD BY AUTOMATED COUNT: 23.9 % (ref 11.5–14.5)
EST. GFR  (AFRICAN AMERICAN): 28 ML/MIN/1.73 M^2
EST. GFR  (NON AFRICAN AMERICAN): 24 ML/MIN/1.73 M^2
GLUCOSE SERPL-MCNC: 92 MG/DL (ref 70–110)
HCT VFR BLD AUTO: 30.5 % (ref 40–54)
HGB BLD-MCNC: 8.4 G/DL (ref 14–18)
HYPOCHROMIA BLD QL SMEAR: ABNORMAL
IMM GRANULOCYTES # BLD AUTO: 0.03 K/UL (ref 0–0.04)
IMM GRANULOCYTES NFR BLD AUTO: 0.4 % (ref 0–0.5)
LYMPHOCYTES # BLD AUTO: 1.2 K/UL (ref 1–4.8)
LYMPHOCYTES NFR BLD: 18 % (ref 18–48)
MCH RBC QN AUTO: 21.3 PG (ref 27–31)
MCHC RBC AUTO-ENTMCNC: 27.5 G/DL (ref 32–36)
MCV RBC AUTO: 77 FL (ref 82–98)
MONOCYTES # BLD AUTO: 0.9 K/UL (ref 0.3–1)
MONOCYTES NFR BLD: 13.2 % (ref 4–15)
NEUTROPHILS # BLD AUTO: 4.4 K/UL (ref 1.8–7.7)
NEUTROPHILS NFR BLD: 65.9 % (ref 38–73)
NRBC BLD-RTO: 0 /100 WBC
OVALOCYTES BLD QL SMEAR: ABNORMAL
PLATELET # BLD AUTO: 150 K/UL (ref 150–350)
PLATELET BLD QL SMEAR: ABNORMAL
PMV BLD AUTO: ABNORMAL FL (ref 9.2–12.9)
POCT GLUCOSE: 115 MG/DL (ref 70–110)
POCT GLUCOSE: 161 MG/DL (ref 70–110)
POIKILOCYTOSIS BLD QL SMEAR: SLIGHT
POLYCHROMASIA BLD QL SMEAR: ABNORMAL
POTASSIUM SERPL-SCNC: 4.2 MMOL/L (ref 3.5–5.1)
PROT SERPL-MCNC: 7.5 G/DL (ref 6–8.4)
RBC # BLD AUTO: 3.94 M/UL (ref 4.6–6.2)
SARS-COV-2 RDRP RESP QL NAA+PROBE: NEGATIVE
SODIUM SERPL-SCNC: 138 MMOL/L (ref 136–145)
TARGETS BLD QL SMEAR: ABNORMAL
WBC # BLD AUTO: 6.74 K/UL (ref 3.9–12.7)

## 2021-03-19 PROCEDURE — 83036 HEMOGLOBIN GLYCOSYLATED A1C: CPT | Performed by: NURSE PRACTITIONER

## 2021-03-19 PROCEDURE — 36247 INS CATH ABD/L-EXT ART 3RD: CPT | Performed by: SURGERY

## 2021-03-19 PROCEDURE — 36415 COLL VENOUS BLD VENIPUNCTURE: CPT | Performed by: NURSE PRACTITIONER

## 2021-03-19 PROCEDURE — 80053 COMPREHEN METABOLIC PANEL: CPT | Performed by: NURSE PRACTITIONER

## 2021-03-19 PROCEDURE — U0002 COVID-19 LAB TEST NON-CDC: HCPCS | Performed by: SURGERY

## 2021-03-19 PROCEDURE — 75710 ARTERY X-RAYS ARM/LEG: CPT | Performed by: SURGERY

## 2021-03-19 PROCEDURE — 85025 COMPLETE CBC W/AUTO DIFF WBC: CPT | Performed by: NURSE PRACTITIONER

## 2021-03-19 PROCEDURE — 25000003 PHARM REV CODE 250: Performed by: INTERNAL MEDICINE

## 2021-03-19 PROCEDURE — 25000003 PHARM REV CODE 250: Performed by: NURSE PRACTITIONER

## 2021-03-19 PROCEDURE — G0378 HOSPITAL OBSERVATION PER HR: HCPCS

## 2021-03-19 RX ORDER — INSULIN ASPART 100 [IU]/ML
0-5 INJECTION, SOLUTION INTRAVENOUS; SUBCUTANEOUS
Status: DISCONTINUED | OUTPATIENT
Start: 2021-03-19 | End: 2021-03-20 | Stop reason: HOSPADM

## 2021-03-19 RX ORDER — HYDRALAZINE HYDROCHLORIDE 20 MG/ML
10 INJECTION INTRAMUSCULAR; INTRAVENOUS EVERY 8 HOURS PRN
Status: DISCONTINUED | OUTPATIENT
Start: 2021-03-19 | End: 2021-03-20 | Stop reason: HOSPADM

## 2021-03-19 RX ORDER — IBUPROFEN 200 MG
16 TABLET ORAL
Status: DISCONTINUED | OUTPATIENT
Start: 2021-03-19 | End: 2021-03-20 | Stop reason: HOSPADM

## 2021-03-19 RX ORDER — BUMETANIDE 1 MG/1
2 TABLET ORAL 2 TIMES DAILY
Status: DISCONTINUED | OUTPATIENT
Start: 2021-03-19 | End: 2021-03-20 | Stop reason: HOSPADM

## 2021-03-19 RX ORDER — ALLOPURINOL 100 MG/1
100 TABLET ORAL DAILY
Status: DISCONTINUED | OUTPATIENT
Start: 2021-03-20 | End: 2021-03-20 | Stop reason: HOSPADM

## 2021-03-19 RX ORDER — ALBUTEROL SULFATE 0.83 MG/ML
2.5 SOLUTION RESPIRATORY (INHALATION) EVERY 6 HOURS PRN
Status: DISCONTINUED | OUTPATIENT
Start: 2021-03-19 | End: 2021-03-20 | Stop reason: HOSPADM

## 2021-03-19 RX ORDER — IBUPROFEN 200 MG
24 TABLET ORAL
Status: DISCONTINUED | OUTPATIENT
Start: 2021-03-19 | End: 2021-03-20 | Stop reason: HOSPADM

## 2021-03-19 RX ORDER — ALBUTEROL SULFATE 90 UG/1
2 AEROSOL, METERED RESPIRATORY (INHALATION) EVERY 6 HOURS PRN
Status: DISCONTINUED | OUTPATIENT
Start: 2021-03-19 | End: 2021-03-19 | Stop reason: CLARIF

## 2021-03-19 RX ORDER — SIMVASTATIN 5 MG/1
10 TABLET, FILM COATED ORAL NIGHTLY
Status: DISCONTINUED | OUTPATIENT
Start: 2021-03-19 | End: 2021-03-20 | Stop reason: HOSPADM

## 2021-03-19 RX ORDER — PANTOPRAZOLE SODIUM 40 MG/1
40 TABLET, DELAYED RELEASE ORAL DAILY
Status: DISCONTINUED | OUTPATIENT
Start: 2021-03-20 | End: 2021-03-20 | Stop reason: HOSPADM

## 2021-03-19 RX ORDER — BRIMONIDINE TARTRATE 2 MG/ML
1 SOLUTION/ DROPS OPHTHALMIC 2 TIMES DAILY
Status: DISCONTINUED | OUTPATIENT
Start: 2021-03-19 | End: 2021-03-20 | Stop reason: HOSPADM

## 2021-03-19 RX ORDER — BRIMONIDINE TARTRATE AND TIMOLOL MALEATE 2; 5 MG/ML; MG/ML
1 SOLUTION OPHTHALMIC EVERY 12 HOURS
Status: DISCONTINUED | OUTPATIENT
Start: 2021-03-19 | End: 2021-03-19

## 2021-03-19 RX ORDER — TIMOLOL MALEATE 5 MG/ML
1 SOLUTION/ DROPS OPHTHALMIC 2 TIMES DAILY
Status: DISCONTINUED | OUTPATIENT
Start: 2021-03-19 | End: 2021-03-20 | Stop reason: HOSPADM

## 2021-03-19 RX ORDER — GLUCAGON 1 MG
1 KIT INJECTION
Status: DISCONTINUED | OUTPATIENT
Start: 2021-03-19 | End: 2021-03-20 | Stop reason: HOSPADM

## 2021-03-19 RX ADMIN — BRIMONIDINE TARTRATE 1 DROP: 2 SOLUTION OPHTHALMIC at 08:03

## 2021-03-19 RX ADMIN — BUMETANIDE 2 MG: 1 TABLET ORAL at 08:03

## 2021-03-19 RX ADMIN — SIMVASTATIN 10 MG: 5 TABLET, FILM COATED ORAL at 08:03

## 2021-03-19 RX ADMIN — TIMOLOL MALEATE 1 DROP: 5 SOLUTION OPHTHALMIC at 08:03

## 2021-03-20 VITALS
BODY MASS INDEX: 30.44 KG/M2 | HEIGHT: 68 IN | DIASTOLIC BLOOD PRESSURE: 75 MMHG | HEART RATE: 99 BPM | OXYGEN SATURATION: 99 % | TEMPERATURE: 97 F | WEIGHT: 200.81 LBS | RESPIRATION RATE: 18 BRPM | SYSTOLIC BLOOD PRESSURE: 114 MMHG

## 2021-03-20 LAB
ALBUMIN SERPL BCP-MCNC: 2.6 G/DL (ref 3.5–5.2)
ALP SERPL-CCNC: 74 U/L (ref 55–135)
ALT SERPL W/O P-5'-P-CCNC: 15 U/L (ref 10–44)
ANION GAP SERPL CALC-SCNC: 11 MMOL/L (ref 8–16)
AST SERPL-CCNC: 32 U/L (ref 10–40)
BASOPHILS # BLD AUTO: 0.05 K/UL (ref 0–0.2)
BASOPHILS NFR BLD: 0.8 % (ref 0–1.9)
BILIRUB SERPL-MCNC: 0.9 MG/DL (ref 0.1–1)
BUN SERPL-MCNC: 45 MG/DL (ref 8–23)
CALCIUM SERPL-MCNC: 8.7 MG/DL (ref 8.7–10.5)
CHLORIDE SERPL-SCNC: 107 MMOL/L (ref 95–110)
CO2 SERPL-SCNC: 20 MMOL/L (ref 23–29)
CREAT SERPL-MCNC: 2.5 MG/DL (ref 0.5–1.4)
DIFFERENTIAL METHOD: ABNORMAL
EOSINOPHIL # BLD AUTO: 0.2 K/UL (ref 0–0.5)
EOSINOPHIL NFR BLD: 2.5 % (ref 0–8)
ERYTHROCYTE [DISTWIDTH] IN BLOOD BY AUTOMATED COUNT: 23.9 % (ref 11.5–14.5)
EST. GFR  (AFRICAN AMERICAN): 30 ML/MIN/1.73 M^2
EST. GFR  (NON AFRICAN AMERICAN): 26 ML/MIN/1.73 M^2
ESTIMATED AVG GLUCOSE: 128 MG/DL (ref 68–131)
ESTIMATED AVG GLUCOSE: 128 MG/DL (ref 68–131)
GLUCOSE SERPL-MCNC: 95 MG/DL (ref 70–110)
HBA1C MFR BLD: 6.1 % (ref 4–5.6)
HBA1C MFR BLD: 6.1 % (ref 4–5.6)
HCT VFR BLD AUTO: 30.9 % (ref 40–54)
HGB BLD-MCNC: 8.4 G/DL (ref 14–18)
IMM GRANULOCYTES # BLD AUTO: 0.02 K/UL (ref 0–0.04)
IMM GRANULOCYTES NFR BLD AUTO: 0.3 % (ref 0–0.5)
LYMPHOCYTES # BLD AUTO: 1.1 K/UL (ref 1–4.8)
LYMPHOCYTES NFR BLD: 17.4 % (ref 18–48)
MCH RBC QN AUTO: 20.9 PG (ref 27–31)
MCHC RBC AUTO-ENTMCNC: 27.2 G/DL (ref 32–36)
MCV RBC AUTO: 77 FL (ref 82–98)
MONOCYTES # BLD AUTO: 0.8 K/UL (ref 0.3–1)
MONOCYTES NFR BLD: 12.5 % (ref 4–15)
NEUTROPHILS # BLD AUTO: 4.2 K/UL (ref 1.8–7.7)
NEUTROPHILS NFR BLD: 66.5 % (ref 38–73)
NRBC BLD-RTO: 0 /100 WBC
PLATELET # BLD AUTO: 151 K/UL (ref 150–350)
PMV BLD AUTO: ABNORMAL FL (ref 9.2–12.9)
POCT GLUCOSE: 117 MG/DL (ref 70–110)
POCT GLUCOSE: 99 MG/DL (ref 70–110)
POTASSIUM SERPL-SCNC: 4.5 MMOL/L (ref 3.5–5.1)
PROT SERPL-MCNC: 7.4 G/DL (ref 6–8.4)
RBC # BLD AUTO: 4.02 M/UL (ref 4.6–6.2)
SODIUM SERPL-SCNC: 138 MMOL/L (ref 136–145)
WBC # BLD AUTO: 6.33 K/UL (ref 3.9–12.7)

## 2021-03-20 PROCEDURE — 99153 MOD SED SAME PHYS/QHP EA: CPT | Performed by: SURGERY

## 2021-03-20 PROCEDURE — 25500020 PHARM REV CODE 255: Performed by: SURGERY

## 2021-03-20 PROCEDURE — 85025 COMPLETE CBC W/AUTO DIFF WBC: CPT | Performed by: NURSE PRACTITIONER

## 2021-03-20 PROCEDURE — C1894 INTRO/SHEATH, NON-LASER: HCPCS | Performed by: SURGERY

## 2021-03-20 PROCEDURE — G0378 HOSPITAL OBSERVATION PER HR: HCPCS

## 2021-03-20 PROCEDURE — 25000003 PHARM REV CODE 250: Performed by: SURGERY

## 2021-03-20 PROCEDURE — C1769 GUIDE WIRE: HCPCS | Performed by: SURGERY

## 2021-03-20 PROCEDURE — 25000003 PHARM REV CODE 250: Performed by: NURSE PRACTITIONER

## 2021-03-20 PROCEDURE — C1887 CATHETER, GUIDING: HCPCS | Performed by: SURGERY

## 2021-03-20 PROCEDURE — C1760 CLOSURE DEV, VASC: HCPCS | Performed by: SURGERY

## 2021-03-20 PROCEDURE — 36415 COLL VENOUS BLD VENIPUNCTURE: CPT | Performed by: NURSE PRACTITIONER

## 2021-03-20 PROCEDURE — 80053 COMPREHEN METABOLIC PANEL: CPT | Performed by: NURSE PRACTITIONER

## 2021-03-20 PROCEDURE — 99152 MOD SED SAME PHYS/QHP 5/>YRS: CPT | Performed by: SURGERY

## 2021-03-20 PROCEDURE — 63600175 PHARM REV CODE 636 W HCPCS: Performed by: SURGERY

## 2021-03-20 PROCEDURE — 27201423 OPTIME MED/SURG SUP & DEVICES STERILE SUPPLY: Performed by: SURGERY

## 2021-03-20 PROCEDURE — C1730 CATH, EP, 19 OR FEW ELECT: HCPCS | Performed by: SURGERY

## 2021-03-20 DEVICE — ANGIO-SEAL VIP VASCULAR CLOSURE DEVICE
Type: IMPLANTABLE DEVICE | Site: GROIN | Status: FUNCTIONAL
Brand: ANGIO-SEAL

## 2021-03-20 RX ORDER — HYDROCODONE BITARTRATE AND ACETAMINOPHEN 5; 325 MG/1; MG/1
1 TABLET ORAL EVERY 4 HOURS PRN
Status: DISCONTINUED | OUTPATIENT
Start: 2021-03-20 | End: 2021-03-20 | Stop reason: HOSPADM

## 2021-03-20 RX ORDER — MIDAZOLAM HYDROCHLORIDE 1 MG/ML
INJECTION, SOLUTION INTRAMUSCULAR; INTRAVENOUS
Status: DISCONTINUED | OUTPATIENT
Start: 2021-03-20 | End: 2021-03-20 | Stop reason: HOSPADM

## 2021-03-20 RX ORDER — FENTANYL CITRATE 50 UG/ML
INJECTION, SOLUTION INTRAMUSCULAR; INTRAVENOUS
Status: DISCONTINUED | OUTPATIENT
Start: 2021-03-20 | End: 2021-03-20 | Stop reason: HOSPADM

## 2021-03-20 RX ORDER — SODIUM CHLORIDE 9 MG/ML
INJECTION, SOLUTION INTRAVENOUS CONTINUOUS
Status: DISCONTINUED | OUTPATIENT
Start: 2021-03-20 | End: 2021-03-20 | Stop reason: HOSPADM

## 2021-03-20 RX ORDER — ACETAMINOPHEN 325 MG/1
650 TABLET ORAL EVERY 4 HOURS PRN
Status: DISCONTINUED | OUTPATIENT
Start: 2021-03-20 | End: 2021-03-20 | Stop reason: HOSPADM

## 2021-03-20 RX ORDER — LIDOCAINE HYDROCHLORIDE 10 MG/ML
INJECTION, SOLUTION EPIDURAL; INFILTRATION; INTRACAUDAL; PERINEURAL
Status: DISCONTINUED | OUTPATIENT
Start: 2021-03-20 | End: 2021-03-20 | Stop reason: HOSPADM

## 2021-03-20 RX ADMIN — BRIMONIDINE TARTRATE 1 DROP: 2 SOLUTION OPHTHALMIC at 08:03

## 2021-03-20 RX ADMIN — TIMOLOL MALEATE 1 DROP: 5 SOLUTION OPHTHALMIC at 08:03

## 2021-03-20 RX ADMIN — PANTOPRAZOLE SODIUM 40 MG: 40 TABLET, DELAYED RELEASE ORAL at 08:03

## 2021-03-20 RX ADMIN — SODIUM CHLORIDE: 0.9 INJECTION, SOLUTION INTRAVENOUS at 12:03

## 2021-03-20 RX ADMIN — ALLOPURINOL 100 MG: 100 TABLET ORAL at 08:03

## 2021-03-22 ENCOUNTER — TELEPHONE (OUTPATIENT)
Dept: VASCULAR SURGERY | Facility: CLINIC | Age: 66
End: 2021-03-22

## 2021-03-22 ENCOUNTER — OFFICE VISIT (OUTPATIENT)
Dept: OPHTHALMOLOGY | Facility: CLINIC | Age: 66
End: 2021-03-22
Payer: MEDICARE

## 2021-03-22 ENCOUNTER — LAB VISIT (OUTPATIENT)
Dept: LAB | Facility: HOSPITAL | Age: 66
End: 2021-03-22
Attending: INTERNAL MEDICINE
Payer: MEDICARE

## 2021-03-22 DIAGNOSIS — H40.1132 PRIMARY OPEN ANGLE GLAUCOMA (POAG) OF BOTH EYES, MODERATE STAGE: Primary | ICD-10-CM

## 2021-03-22 DIAGNOSIS — E11.59 HYPERTENSION ASSOCIATED WITH DIABETES: ICD-10-CM

## 2021-03-22 DIAGNOSIS — N17.9 AKI (ACUTE KIDNEY INJURY): ICD-10-CM

## 2021-03-22 DIAGNOSIS — E11.3292 CONTROLLED TYPE 2 DIABETES MELLITUS WITH LEFT EYE AFFECTED BY MILD NONPROLIFERATIVE RETINOPATHY WITHOUT MACULAR EDEMA, WITH LONG-TERM CURRENT USE OF INSULIN: ICD-10-CM

## 2021-03-22 DIAGNOSIS — E55.9 VITAMIN D DEFICIENCY: ICD-10-CM

## 2021-03-22 DIAGNOSIS — H40.1122 PRIMARY OPEN ANGLE GLAUCOMA (POAG) OF LEFT EYE, MODERATE STAGE: ICD-10-CM

## 2021-03-22 DIAGNOSIS — N18.4 CKD (CHRONIC KIDNEY DISEASE) STAGE 4, GFR 15-29 ML/MIN: ICD-10-CM

## 2021-03-22 DIAGNOSIS — Z79.4 CONTROLLED TYPE 2 DIABETES MELLITUS WITH LEFT EYE AFFECTED BY MILD NONPROLIFERATIVE RETINOPATHY WITHOUT MACULAR EDEMA, WITH LONG-TERM CURRENT USE OF INSULIN: ICD-10-CM

## 2021-03-22 DIAGNOSIS — I15.2 HYPERTENSION ASSOCIATED WITH DIABETES: ICD-10-CM

## 2021-03-22 LAB
ALBUMIN SERPL BCP-MCNC: 2.7 G/DL (ref 3.5–5.2)
ANION GAP SERPL CALC-SCNC: 7 MMOL/L (ref 8–16)
BNP SERPL-MCNC: 3422 PG/ML (ref 0–99)
BUN SERPL-MCNC: 56 MG/DL (ref 8–23)
CALCIUM SERPL-MCNC: 8.8 MG/DL (ref 8.7–10.5)
CHLORIDE SERPL-SCNC: 101 MMOL/L (ref 95–110)
CO2 SERPL-SCNC: 26 MMOL/L (ref 23–29)
CREAT SERPL-MCNC: 3.6 MG/DL (ref 0.5–1.4)
EST. GFR  (AFRICAN AMERICAN): 19 ML/MIN/1.73 M^2
EST. GFR  (NON AFRICAN AMERICAN): 16 ML/MIN/1.73 M^2
GLUCOSE SERPL-MCNC: 90 MG/DL (ref 70–110)
PHOSPHATE SERPL-MCNC: 2.9 MG/DL (ref 2.7–4.5)
POTASSIUM SERPL-SCNC: 4.7 MMOL/L (ref 3.5–5.1)
PTH-INTACT SERPL-MCNC: 98 PG/ML (ref 9–77)
SODIUM SERPL-SCNC: 134 MMOL/L (ref 136–145)

## 2021-03-22 PROCEDURE — 99999 PR PBB SHADOW E&M-EST. PATIENT-LVL I: ICD-10-PCS | Mod: PBBFAC,,, | Performed by: OPHTHALMOLOGY

## 2021-03-22 PROCEDURE — 99211 OFF/OP EST MAY X REQ PHY/QHP: CPT | Mod: PBBFAC,25 | Performed by: OPHTHALMOLOGY

## 2021-03-22 PROCEDURE — 92083 HUMPHREY VISUAL FIELD - OU - BOTH EYES: ICD-10-PCS | Mod: 26,S$PBB,, | Performed by: OPHTHALMOLOGY

## 2021-03-22 PROCEDURE — 92083 EXTENDED VISUAL FIELD XM: CPT | Mod: PBBFAC | Performed by: OPHTHALMOLOGY

## 2021-03-22 PROCEDURE — 80069 RENAL FUNCTION PANEL: CPT | Performed by: INTERNAL MEDICINE

## 2021-03-22 PROCEDURE — 36415 COLL VENOUS BLD VENIPUNCTURE: CPT | Performed by: INTERNAL MEDICINE

## 2021-03-22 PROCEDURE — 99214 OFFICE O/P EST MOD 30 MIN: CPT | Mod: S$PBB,,, | Performed by: OPHTHALMOLOGY

## 2021-03-22 PROCEDURE — 92250 FUNDUS PHOTOGRAPHY W/I&R: CPT | Mod: PBBFAC | Performed by: OPHTHALMOLOGY

## 2021-03-22 PROCEDURE — 83880 ASSAY OF NATRIURETIC PEPTIDE: CPT | Performed by: INTERNAL MEDICINE

## 2021-03-22 PROCEDURE — 92250 COLOR FUNDUS PHOTOGRAPHY - OU - BOTH EYES: ICD-10-PCS | Mod: 26,S$PBB,, | Performed by: OPHTHALMOLOGY

## 2021-03-22 PROCEDURE — 83970 ASSAY OF PARATHORMONE: CPT | Performed by: INTERNAL MEDICINE

## 2021-03-22 PROCEDURE — 82306 VITAMIN D 25 HYDROXY: CPT | Performed by: INTERNAL MEDICINE

## 2021-03-22 PROCEDURE — 99214 PR OFFICE/OUTPT VISIT, EST, LEVL IV, 30-39 MIN: ICD-10-PCS | Mod: S$PBB,,, | Performed by: OPHTHALMOLOGY

## 2021-03-22 PROCEDURE — 99999 PR PBB SHADOW E&M-EST. PATIENT-LVL I: CPT | Mod: PBBFAC,,, | Performed by: OPHTHALMOLOGY

## 2021-03-23 ENCOUNTER — TELEPHONE (OUTPATIENT)
Dept: VASCULAR SURGERY | Facility: CLINIC | Age: 66
End: 2021-03-23

## 2021-03-23 LAB — 25(OH)D3+25(OH)D2 SERPL-MCNC: 38 NG/ML (ref 30–96)

## 2021-03-24 ENCOUNTER — OFFICE VISIT (OUTPATIENT)
Dept: INTERNAL MEDICINE | Facility: CLINIC | Age: 66
End: 2021-03-24
Payer: MEDICARE

## 2021-03-24 VITALS
DIASTOLIC BLOOD PRESSURE: 76 MMHG | HEART RATE: 68 BPM | BODY MASS INDEX: 31.84 KG/M2 | OXYGEN SATURATION: 95 % | WEIGHT: 209.44 LBS | TEMPERATURE: 98 F | SYSTOLIC BLOOD PRESSURE: 130 MMHG

## 2021-03-24 DIAGNOSIS — E78.5 HYPERLIPIDEMIA ASSOCIATED WITH TYPE 2 DIABETES MELLITUS: ICD-10-CM

## 2021-03-24 DIAGNOSIS — I70.219 ATHEROSCLEROTIC PVD WITH INTERMITTENT CLAUDICATION: ICD-10-CM

## 2021-03-24 DIAGNOSIS — E21.3 HYPERPARATHYROIDISM: ICD-10-CM

## 2021-03-24 DIAGNOSIS — I25.9 CHRONIC ISCHEMIC HEART DISEASE: ICD-10-CM

## 2021-03-24 DIAGNOSIS — E55.9 VITAMIN D DEFICIENCY: ICD-10-CM

## 2021-03-24 DIAGNOSIS — I50.42 CHRONIC COMBINED SYSTOLIC AND DIASTOLIC CONGESTIVE HEART FAILURE: ICD-10-CM

## 2021-03-24 DIAGNOSIS — N18.4 STAGE 4 CHRONIC KIDNEY DISEASE: ICD-10-CM

## 2021-03-24 DIAGNOSIS — I15.2 HYPERTENSION ASSOCIATED WITH DIABETES: ICD-10-CM

## 2021-03-24 DIAGNOSIS — N25.81 HYPERPARATHYROIDISM, SECONDARY RENAL: ICD-10-CM

## 2021-03-24 DIAGNOSIS — Z86.16 HISTORY OF 2019 NOVEL CORONAVIRUS DISEASE (COVID-19): ICD-10-CM

## 2021-03-24 DIAGNOSIS — K21.9 GASTROESOPHAGEAL REFLUX DISEASE, UNSPECIFIED WHETHER ESOPHAGITIS PRESENT: ICD-10-CM

## 2021-03-24 DIAGNOSIS — E11.59 HYPERTENSION ASSOCIATED WITH DIABETES: ICD-10-CM

## 2021-03-24 DIAGNOSIS — Z12.5 ENCOUNTER FOR SCREENING FOR MALIGNANT NEOPLASM OF PROSTATE: ICD-10-CM

## 2021-03-24 DIAGNOSIS — I50.23 ACUTE ON CHRONIC SYSTOLIC HEART FAILURE: ICD-10-CM

## 2021-03-24 DIAGNOSIS — I73.9 PAD (PERIPHERAL ARTERY DISEASE): Primary | ICD-10-CM

## 2021-03-24 DIAGNOSIS — I25.10 CAD, MULTIPLE VESSEL: ICD-10-CM

## 2021-03-24 DIAGNOSIS — Z79.4 CONTROLLED TYPE 2 DIABETES MELLITUS WITH LEFT EYE AFFECTED BY MILD NONPROLIFERATIVE RETINOPATHY WITHOUT MACULAR EDEMA, WITH LONG-TERM CURRENT USE OF INSULIN: ICD-10-CM

## 2021-03-24 DIAGNOSIS — Z98.890 HISTORY OF COLOSTOMY REVERSAL: ICD-10-CM

## 2021-03-24 DIAGNOSIS — E11.22 TYPE 2 DIABETES MELLITUS WITH STAGE 4 CHRONIC KIDNEY DISEASE, WITH LONG-TERM CURRENT USE OF INSULIN: ICD-10-CM

## 2021-03-24 DIAGNOSIS — E11.3292 CONTROLLED TYPE 2 DIABETES MELLITUS WITH LEFT EYE AFFECTED BY MILD NONPROLIFERATIVE RETINOPATHY WITHOUT MACULAR EDEMA, WITH LONG-TERM CURRENT USE OF INSULIN: ICD-10-CM

## 2021-03-24 DIAGNOSIS — Z95.810 BIVENTRICULAR ICD (IMPLANTABLE CARDIOVERTER-DEFIBRILLATOR) IN PLACE: ICD-10-CM

## 2021-03-24 DIAGNOSIS — E11.69 HYPERLIPIDEMIA ASSOCIATED WITH TYPE 2 DIABETES MELLITUS: ICD-10-CM

## 2021-03-24 DIAGNOSIS — N18.4 TYPE 2 DIABETES MELLITUS WITH STAGE 4 CHRONIC KIDNEY DISEASE, WITH LONG-TERM CURRENT USE OF INSULIN: ICD-10-CM

## 2021-03-24 DIAGNOSIS — I25.5 ISCHEMIC CARDIOMYOPATHY: ICD-10-CM

## 2021-03-24 DIAGNOSIS — I27.20 PULMONARY HTN: ICD-10-CM

## 2021-03-24 DIAGNOSIS — M1A.0790 CHRONIC IDIOPATHIC GOUT INVOLVING TOE WITHOUT TOPHUS, UNSPECIFIED LATERALITY: ICD-10-CM

## 2021-03-24 DIAGNOSIS — Z79.4 TYPE 2 DIABETES MELLITUS WITH STAGE 4 CHRONIC KIDNEY DISEASE, WITH LONG-TERM CURRENT USE OF INSULIN: ICD-10-CM

## 2021-03-24 PROBLEM — Z12.11 SCREENING FOR COLON CANCER: Status: RESOLVED | Noted: 2018-10-11 | Resolved: 2021-03-24

## 2021-03-24 PROBLEM — E66.01 CLASS 2 SEVERE OBESITY DUE TO EXCESS CALORIES WITH SERIOUS COMORBIDITY AND BODY MASS INDEX (BMI) OF 35.0 TO 35.9 IN ADULT: Status: RESOLVED | Noted: 2020-05-05 | Resolved: 2021-03-24

## 2021-03-24 PROBLEM — R79.89 ELEVATED LFTS: Status: RESOLVED | Noted: 2020-05-28 | Resolved: 2021-03-24

## 2021-03-24 PROBLEM — R23.9 ALTERATION IN SKIN INTEGRITY: Status: RESOLVED | Noted: 2018-12-12 | Resolved: 2021-03-24

## 2021-03-24 PROCEDURE — 99215 OFFICE O/P EST HI 40 MIN: CPT | Mod: PBBFAC | Performed by: FAMILY MEDICINE

## 2021-03-24 PROCEDURE — 99999 PR PBB SHADOW E&M-EST. PATIENT-LVL V: ICD-10-PCS | Mod: PBBFAC,,, | Performed by: FAMILY MEDICINE

## 2021-03-24 PROCEDURE — 99999 PR PBB SHADOW E&M-EST. PATIENT-LVL V: CPT | Mod: PBBFAC,,, | Performed by: FAMILY MEDICINE

## 2021-03-24 PROCEDURE — 99214 OFFICE O/P EST MOD 30 MIN: CPT | Mod: S$PBB,,, | Performed by: FAMILY MEDICINE

## 2021-03-24 PROCEDURE — 99214 PR OFFICE/OUTPT VISIT, EST, LEVL IV, 30-39 MIN: ICD-10-PCS | Mod: S$PBB,,, | Performed by: FAMILY MEDICINE

## 2021-03-28 ENCOUNTER — CLINICAL SUPPORT (OUTPATIENT)
Dept: CARDIOLOGY | Facility: HOSPITAL | Age: 66
End: 2021-03-28
Payer: MEDICARE

## 2021-03-28 DIAGNOSIS — Z95.810 PRESENCE OF AUTOMATIC (IMPLANTABLE) CARDIAC DEFIBRILLATOR: ICD-10-CM

## 2021-03-28 PROCEDURE — 93295 DEV INTERROG REMOTE 1/2/MLT: CPT | Mod: ,,, | Performed by: INTERNAL MEDICINE

## 2021-03-28 PROCEDURE — 93295 CARDIAC DEVICE CHECK - REMOTE: ICD-10-PCS | Mod: ,,, | Performed by: INTERNAL MEDICINE

## 2021-03-30 ENCOUNTER — LAB VISIT (OUTPATIENT)
Dept: LAB | Facility: HOSPITAL | Age: 66
End: 2021-03-30
Attending: FAMILY MEDICINE
Payer: MEDICARE

## 2021-03-30 ENCOUNTER — OFFICE VISIT (OUTPATIENT)
Dept: NEPHROLOGY | Facility: CLINIC | Age: 66
End: 2021-03-30
Payer: MEDICARE

## 2021-03-30 VITALS
WEIGHT: 198.19 LBS | BODY MASS INDEX: 30.04 KG/M2 | RESPIRATION RATE: 20 BRPM | DIASTOLIC BLOOD PRESSURE: 80 MMHG | HEIGHT: 68 IN | SYSTOLIC BLOOD PRESSURE: 108 MMHG | HEART RATE: 80 BPM

## 2021-03-30 DIAGNOSIS — I15.2 HYPERTENSION ASSOCIATED WITH DIABETES: ICD-10-CM

## 2021-03-30 DIAGNOSIS — E11.59 HYPERTENSION ASSOCIATED WITH DIABETES: ICD-10-CM

## 2021-03-30 DIAGNOSIS — I13.0 CARDIORENAL SYNDROME WITH RENAL FAILURE, STAGE 1-4 OR UNSPECIFIED CHRONIC KIDNEY DISEASE, WITH HEART FAILURE: Primary | ICD-10-CM

## 2021-03-30 DIAGNOSIS — I50.23 ACUTE ON CHRONIC SYSTOLIC HEART FAILURE: ICD-10-CM

## 2021-03-30 DIAGNOSIS — Z12.5 ENCOUNTER FOR SCREENING FOR MALIGNANT NEOPLASM OF PROSTATE: ICD-10-CM

## 2021-03-30 LAB
ANION GAP SERPL CALC-SCNC: 9 MMOL/L (ref 8–16)
BNP SERPL-MCNC: 3549 PG/ML (ref 0–99)
BUN SERPL-MCNC: 54 MG/DL (ref 8–23)
CALCIUM SERPL-MCNC: 8.8 MG/DL (ref 8.7–10.5)
CHLORIDE SERPL-SCNC: 104 MMOL/L (ref 95–110)
CHOLEST SERPL-MCNC: 73 MG/DL (ref 120–199)
CHOLEST/HDLC SERPL: 3.5 {RATIO} (ref 2–5)
CO2 SERPL-SCNC: 23 MMOL/L (ref 23–29)
COMPLEXED PSA SERPL-MCNC: 1.4 NG/ML (ref 0–4)
CREAT SERPL-MCNC: 3.1 MG/DL (ref 0.5–1.4)
EST. GFR  (AFRICAN AMERICAN): 23 ML/MIN/1.73 M^2
EST. GFR  (NON AFRICAN AMERICAN): 20 ML/MIN/1.73 M^2
GLUCOSE SERPL-MCNC: 149 MG/DL (ref 70–110)
HDLC SERPL-MCNC: 21 MG/DL (ref 40–75)
HDLC SERPL: 28.8 % (ref 20–50)
LDLC SERPL CALC-MCNC: 40.6 MG/DL (ref 63–159)
NONHDLC SERPL-MCNC: 52 MG/DL
POTASSIUM SERPL-SCNC: 4.9 MMOL/L (ref 3.5–5.1)
SODIUM SERPL-SCNC: 136 MMOL/L (ref 136–145)
TRIGL SERPL-MCNC: 57 MG/DL (ref 30–150)

## 2021-03-30 PROCEDURE — 84153 ASSAY OF PSA TOTAL: CPT | Performed by: FAMILY MEDICINE

## 2021-03-30 PROCEDURE — 99999 PR PBB SHADOW E&M-EST. PATIENT-LVL IV: CPT | Mod: PBBFAC,,, | Performed by: INTERNAL MEDICINE

## 2021-03-30 PROCEDURE — 99215 OFFICE O/P EST HI 40 MIN: CPT | Mod: S$PBB,,, | Performed by: INTERNAL MEDICINE

## 2021-03-30 PROCEDURE — 80061 LIPID PANEL: CPT | Performed by: FAMILY MEDICINE

## 2021-03-30 PROCEDURE — 99215 PR OFFICE/OUTPT VISIT, EST, LEVL V, 40-54 MIN: ICD-10-PCS | Mod: S$PBB,,, | Performed by: INTERNAL MEDICINE

## 2021-03-30 PROCEDURE — 36415 COLL VENOUS BLD VENIPUNCTURE: CPT | Performed by: FAMILY MEDICINE

## 2021-03-30 PROCEDURE — 83880 ASSAY OF NATRIURETIC PEPTIDE: CPT | Performed by: FAMILY MEDICINE

## 2021-03-30 PROCEDURE — 99999 PR PBB SHADOW E&M-EST. PATIENT-LVL IV: ICD-10-PCS | Mod: PBBFAC,,, | Performed by: INTERNAL MEDICINE

## 2021-03-30 PROCEDURE — 80048 BASIC METABOLIC PNL TOTAL CA: CPT | Performed by: FAMILY MEDICINE

## 2021-03-30 PROCEDURE — 99214 OFFICE O/P EST MOD 30 MIN: CPT | Mod: PBBFAC | Performed by: INTERNAL MEDICINE

## 2021-04-04 ENCOUNTER — HOSPITAL ENCOUNTER (INPATIENT)
Facility: HOSPITAL | Age: 66
LOS: 7 days | Discharge: NURSING, REHAB OR LONG-TERM CARE | DRG: 862 | End: 2021-04-12
Attending: STUDENT IN AN ORGANIZED HEALTH CARE EDUCATION/TRAINING PROGRAM | Admitting: STUDENT IN AN ORGANIZED HEALTH CARE EDUCATION/TRAINING PROGRAM
Payer: MEDICARE

## 2021-04-04 DIAGNOSIS — E08.621: ICD-10-CM

## 2021-04-04 DIAGNOSIS — I50.9 CHF (CONGESTIVE HEART FAILURE): ICD-10-CM

## 2021-04-04 DIAGNOSIS — I15.2 HYPERTENSION ASSOCIATED WITH DIABETES: ICD-10-CM

## 2021-04-04 DIAGNOSIS — M86.171 OTHER ACUTE OSTEOMYELITIS OF RIGHT FOOT: Primary | ICD-10-CM

## 2021-04-04 DIAGNOSIS — T14.8XXA NONHEALING NONSURGICAL WOUND: ICD-10-CM

## 2021-04-04 DIAGNOSIS — Z79.4 CONTROLLED TYPE 2 DIABETES MELLITUS WITH LEFT EYE AFFECTED BY MILD NONPROLIFERATIVE RETINOPATHY WITHOUT MACULAR EDEMA, WITH LONG-TERM CURRENT USE OF INSULIN: ICD-10-CM

## 2021-04-04 DIAGNOSIS — R00.0 TACHYCARDIA: ICD-10-CM

## 2021-04-04 DIAGNOSIS — I13.10 CARDIORENAL SYNDROME WITH RENAL FAILURE: ICD-10-CM

## 2021-04-04 DIAGNOSIS — M86.071 ACUTE HEMATOGENOUS OSTEOMYELITIS OF RIGHT FOOT: ICD-10-CM

## 2021-04-04 DIAGNOSIS — E11.3292 CONTROLLED TYPE 2 DIABETES MELLITUS WITH LEFT EYE AFFECTED BY MILD NONPROLIFERATIVE RETINOPATHY WITHOUT MACULAR EDEMA, WITH LONG-TERM CURRENT USE OF INSULIN: ICD-10-CM

## 2021-04-04 DIAGNOSIS — I25.10 CAD, MULTIPLE VESSEL: ICD-10-CM

## 2021-04-04 DIAGNOSIS — E11.59 HYPERTENSION ASSOCIATED WITH DIABETES: ICD-10-CM

## 2021-04-04 DIAGNOSIS — R79.89 ELEVATED TROPONIN: ICD-10-CM

## 2021-04-04 DIAGNOSIS — E11.69 HYPERLIPIDEMIA ASSOCIATED WITH TYPE 2 DIABETES MELLITUS: ICD-10-CM

## 2021-04-04 DIAGNOSIS — A41.51 E. COLI SEPTICEMIA: ICD-10-CM

## 2021-04-04 DIAGNOSIS — E78.5 HYPERLIPIDEMIA ASSOCIATED WITH TYPE 2 DIABETES MELLITUS: ICD-10-CM

## 2021-04-04 DIAGNOSIS — I13.10 CARDIORENAL SYNDROME: ICD-10-CM

## 2021-04-04 DIAGNOSIS — L97.405: ICD-10-CM

## 2021-04-04 DIAGNOSIS — N18.4 STAGE 4 CHRONIC KIDNEY DISEASE DUE TO TYPE 2 DIABETES MELLITUS: ICD-10-CM

## 2021-04-04 DIAGNOSIS — E11.22 STAGE 4 CHRONIC KIDNEY DISEASE DUE TO TYPE 2 DIABETES MELLITUS: ICD-10-CM

## 2021-04-04 DIAGNOSIS — M86.011: ICD-10-CM

## 2021-04-04 PROCEDURE — 84484 ASSAY OF TROPONIN QUANT: CPT | Performed by: STUDENT IN AN ORGANIZED HEALTH CARE EDUCATION/TRAINING PROGRAM

## 2021-04-04 PROCEDURE — 93010 ELECTROCARDIOGRAM REPORT: CPT | Mod: ,,, | Performed by: INTERNAL MEDICINE

## 2021-04-04 PROCEDURE — 80053 COMPREHEN METABOLIC PANEL: CPT | Performed by: STUDENT IN AN ORGANIZED HEALTH CARE EDUCATION/TRAINING PROGRAM

## 2021-04-04 PROCEDURE — 85025 COMPLETE CBC W/AUTO DIFF WBC: CPT | Performed by: STUDENT IN AN ORGANIZED HEALTH CARE EDUCATION/TRAINING PROGRAM

## 2021-04-04 PROCEDURE — 83880 ASSAY OF NATRIURETIC PEPTIDE: CPT | Performed by: STUDENT IN AN ORGANIZED HEALTH CARE EDUCATION/TRAINING PROGRAM

## 2021-04-04 PROCEDURE — 93005 ELECTROCARDIOGRAM TRACING: CPT

## 2021-04-04 PROCEDURE — 99291 CRITICAL CARE FIRST HOUR: CPT | Mod: 25

## 2021-04-04 PROCEDURE — 86803 HEPATITIS C AB TEST: CPT | Performed by: STUDENT IN AN ORGANIZED HEALTH CARE EDUCATION/TRAINING PROGRAM

## 2021-04-04 PROCEDURE — 93010 EKG 12-LEAD: ICD-10-PCS | Mod: ,,, | Performed by: INTERNAL MEDICINE

## 2021-04-05 PROBLEM — I50.9 CHF (CONGESTIVE HEART FAILURE): Status: ACTIVE | Noted: 2021-04-05

## 2021-04-05 PROBLEM — D72.829 LEUKOCYTOSIS: Status: ACTIVE | Noted: 2021-04-05

## 2021-04-05 PROBLEM — I13.10 CARDIORENAL SYNDROME WITH RENAL FAILURE: Status: ACTIVE | Noted: 2021-04-05

## 2021-04-05 PROBLEM — T14.8XXA NONHEALING NONSURGICAL WOUND: Status: ACTIVE | Noted: 2021-04-05

## 2021-04-05 LAB
ALBUMIN SERPL BCP-MCNC: 2 G/DL (ref 3.5–5.2)
ALBUMIN SERPL BCP-MCNC: 2.1 G/DL (ref 3.5–5.2)
ALP SERPL-CCNC: 92 U/L (ref 55–135)
ALP SERPL-CCNC: 93 U/L (ref 55–135)
ALT SERPL W/O P-5'-P-CCNC: 12 U/L (ref 10–44)
ALT SERPL W/O P-5'-P-CCNC: 14 U/L (ref 10–44)
ANION GAP SERPL CALC-SCNC: 11 MMOL/L (ref 8–16)
ANION GAP SERPL CALC-SCNC: 8 MMOL/L (ref 8–16)
AORTIC ROOT ANNULUS: 3.47 CM
ASCENDING AORTA: 3.7 CM
AST SERPL-CCNC: 54 U/L (ref 10–40)
AST SERPL-CCNC: 54 U/L (ref 10–40)
AV INDEX (PROSTH): 0.51
AV MEAN GRADIENT: 4 MMHG
AV PEAK GRADIENT: 7 MMHG
AV VALVE AREA: 1.49 CM2
AV VELOCITY RATIO: 0.56
BASOPHILS # BLD AUTO: 0.04 K/UL (ref 0–0.2)
BASOPHILS # BLD AUTO: 0.04 K/UL (ref 0–0.2)
BASOPHILS NFR BLD: 0.2 % (ref 0–1.9)
BASOPHILS NFR BLD: 0.2 % (ref 0–1.9)
BILIRUB SERPL-MCNC: 0.7 MG/DL (ref 0.1–1)
BILIRUB SERPL-MCNC: 0.8 MG/DL (ref 0.1–1)
BILIRUB UR QL STRIP: NEGATIVE
BNP SERPL-MCNC: 929 PG/ML (ref 0–99)
BSA FOR ECHO PROCEDURE: 2.08 M2
BUN SERPL-MCNC: 81 MG/DL (ref 8–23)
BUN SERPL-MCNC: 84 MG/DL (ref 8–23)
CALCIUM SERPL-MCNC: 7.9 MG/DL (ref 8.7–10.5)
CALCIUM SERPL-MCNC: 7.9 MG/DL (ref 8.7–10.5)
CHLORIDE SERPL-SCNC: 102 MMOL/L (ref 95–110)
CHLORIDE SERPL-SCNC: 104 MMOL/L (ref 95–110)
CLARITY UR: CLEAR
CO2 SERPL-SCNC: 22 MMOL/L (ref 23–29)
CO2 SERPL-SCNC: 22 MMOL/L (ref 23–29)
COLOR UR: YELLOW
CREAT SERPL-MCNC: 4.7 MG/DL (ref 0.5–1.4)
CREAT SERPL-MCNC: 5.1 MG/DL (ref 0.5–1.4)
CRP SERPL-MCNC: 85.4 MG/L (ref 0–8.2)
CTP QC/QA: YES
CV ECHO LV RWT: 0.46 CM
DIFFERENTIAL METHOD: ABNORMAL
DIFFERENTIAL METHOD: ABNORMAL
DOP CALC AO PEAK VEL: 1.33 M/S
DOP CALC AO VTI: 25.9 CM
DOP CALC LVOT AREA: 2.9 CM2
DOP CALC LVOT DIAMETER: 1.93 CM
DOP CALC LVOT PEAK VEL: 0.74 M/S
DOP CALC LVOT STROKE VOLUME: 38.63 CM3
DOP CALC RVOT PEAK VEL: 0.73 M/S
DOP CALC RVOT VTI: 13.32 CM
DOP CALCLVOT PEAK VEL VTI: 13.21 CM
E WAVE DECELERATION TIME: 134.93 MSEC
E/A RATIO: 1.29
E/E' RATIO: 10 M/S
ECHO LV POSTERIOR WALL: 1.36 CM (ref 0.6–1.1)
EJECTION FRACTION: 15 %
EOSINOPHIL # BLD AUTO: 0.1 K/UL (ref 0–0.5)
EOSINOPHIL # BLD AUTO: 0.1 K/UL (ref 0–0.5)
EOSINOPHIL NFR BLD: 0.5 % (ref 0–8)
EOSINOPHIL NFR BLD: 0.6 % (ref 0–8)
ERYTHROCYTE [DISTWIDTH] IN BLOOD BY AUTOMATED COUNT: 22.7 % (ref 11.5–14.5)
ERYTHROCYTE [DISTWIDTH] IN BLOOD BY AUTOMATED COUNT: 23 % (ref 11.5–14.5)
ERYTHROCYTE [SEDIMENTATION RATE] IN BLOOD BY WESTERGREN METHOD: 40 MM/HR (ref 0–10)
EST. GFR  (AFRICAN AMERICAN): 13 ML/MIN/1.73 M^2
EST. GFR  (AFRICAN AMERICAN): 14 ML/MIN/1.73 M^2
EST. GFR  (NON AFRICAN AMERICAN): 11 ML/MIN/1.73 M^2
EST. GFR  (NON AFRICAN AMERICAN): 12 ML/MIN/1.73 M^2
FRACTIONAL SHORTENING: 4 % (ref 28–44)
GLUCOSE SERPL-MCNC: 119 MG/DL (ref 70–110)
GLUCOSE SERPL-MCNC: 58 MG/DL (ref 70–110)
GLUCOSE UR QL STRIP: NEGATIVE
HCT VFR BLD AUTO: 31.8 % (ref 40–54)
HCT VFR BLD AUTO: 33.2 % (ref 40–54)
HCV AB SERPL QL IA: NEGATIVE
HGB BLD-MCNC: 9.2 G/DL (ref 14–18)
HGB BLD-MCNC: 9.3 G/DL (ref 14–18)
HGB UR QL STRIP: NEGATIVE
IMM GRANULOCYTES # BLD AUTO: 0.28 K/UL (ref 0–0.04)
IMM GRANULOCYTES # BLD AUTO: 0.3 K/UL (ref 0–0.04)
IMM GRANULOCYTES NFR BLD AUTO: 1.3 % (ref 0–0.5)
IMM GRANULOCYTES NFR BLD AUTO: 1.4 % (ref 0–0.5)
INTERVENTRICULAR SEPTUM: 1.45 CM (ref 0.6–1.1)
IVRT: 87.54 MSEC
KETONES UR QL STRIP: NEGATIVE
LA MAJOR: 4.56 CM
LA MINOR: 3.93 CM
LA WIDTH: 3.69 CM
LACTATE SERPL-SCNC: 1.1 MMOL/L (ref 0.5–2.2)
LEFT ATRIUM SIZE: 3.42 CM
LEFT ATRIUM VOLUME INDEX: 22.2 ML/M2
LEFT ATRIUM VOLUME: 45.28 CM3
LEFT INTERNAL DIMENSION IN SYSTOLE: 5.7 CM (ref 2.1–4)
LEFT VENTRICLE DIASTOLIC VOLUME INDEX: 86.32 ML/M2
LEFT VENTRICLE DIASTOLIC VOLUME: 176.09 ML
LEFT VENTRICLE MASS INDEX: 188 G/M2
LEFT VENTRICLE SYSTOLIC VOLUME INDEX: 78.3 ML/M2
LEFT VENTRICLE SYSTOLIC VOLUME: 159.73 ML
LEFT VENTRICULAR INTERNAL DIMENSION IN DIASTOLE: 5.94 CM (ref 3.5–6)
LEFT VENTRICULAR MASS: 383.6 G
LEUKOCYTE ESTERASE UR QL STRIP: NEGATIVE
LV LATERAL E/E' RATIO: 8.89 M/S
LV SEPTAL E/E' RATIO: 11.43 M/S
LYMPHOCYTES # BLD AUTO: 1.3 K/UL (ref 1–4.8)
LYMPHOCYTES # BLD AUTO: 1.6 K/UL (ref 1–4.8)
LYMPHOCYTES NFR BLD: 5.8 % (ref 18–48)
LYMPHOCYTES NFR BLD: 7.8 % (ref 18–48)
MAGNESIUM SERPL-MCNC: 1.7 MG/DL (ref 1.6–2.6)
MCH RBC QN AUTO: 20.9 PG (ref 27–31)
MCH RBC QN AUTO: 21.3 PG (ref 27–31)
MCHC RBC AUTO-ENTMCNC: 28 G/DL (ref 32–36)
MCHC RBC AUTO-ENTMCNC: 28.9 G/DL (ref 32–36)
MCV RBC AUTO: 74 FL (ref 82–98)
MCV RBC AUTO: 75 FL (ref 82–98)
MONOCYTES # BLD AUTO: 1.4 K/UL (ref 0.3–1)
MONOCYTES # BLD AUTO: 1.6 K/UL (ref 0.3–1)
MONOCYTES NFR BLD: 6.1 % (ref 4–15)
MONOCYTES NFR BLD: 7.6 % (ref 4–15)
MV PEAK A VEL: 0.62 M/S
MV PEAK E VEL: 0.8 M/S
MV STENOSIS PRESSURE HALF TIME: 39.13 MS
MV VALVE AREA P 1/2 METHOD: 5.62 CM2
NEUTROPHILS # BLD AUTO: 17.1 K/UL (ref 1.8–7.7)
NEUTROPHILS # BLD AUTO: 19 K/UL (ref 1.8–7.7)
NEUTROPHILS NFR BLD: 82.5 % (ref 38–73)
NEUTROPHILS NFR BLD: 86 % (ref 38–73)
NITRITE UR QL STRIP: NEGATIVE
NRBC BLD-RTO: 0 /100 WBC
NRBC BLD-RTO: 0 /100 WBC
PH UR STRIP: 5 [PH] (ref 5–8)
PISA TR MAX VEL: 3.9 M/S
PLATELET # BLD AUTO: 103 K/UL (ref 150–450)
PLATELET # BLD AUTO: 113 K/UL (ref 150–450)
PMV BLD AUTO: ABNORMAL FL (ref 9.2–12.9)
PMV BLD AUTO: ABNORMAL FL (ref 9.2–12.9)
POCT GLUCOSE: 125 MG/DL (ref 70–110)
POCT GLUCOSE: 43 MG/DL (ref 70–110)
POCT GLUCOSE: 71 MG/DL (ref 70–110)
POCT GLUCOSE: 74 MG/DL (ref 70–110)
POCT GLUCOSE: 93 MG/DL (ref 70–110)
POTASSIUM SERPL-SCNC: 4.5 MMOL/L (ref 3.5–5.1)
POTASSIUM SERPL-SCNC: 4.7 MMOL/L (ref 3.5–5.1)
PROCALCITONIN SERPL IA-MCNC: 7.78 NG/ML
PROT SERPL-MCNC: 6.3 G/DL (ref 6–8.4)
PROT SERPL-MCNC: 6.6 G/DL (ref 6–8.4)
PROT UR QL STRIP: NEGATIVE
PV MEAN GRADIENT: 1 MMHG
RA MAJOR: 4.45 CM
RA PRESSURE: 8 MMHG
RBC # BLD AUTO: 4.31 M/UL (ref 4.6–6.2)
RBC # BLD AUTO: 4.44 M/UL (ref 4.6–6.2)
SARS-COV-2 RDRP RESP QL NAA+PROBE: NEGATIVE
SINUS: 3.73 CM
SODIUM SERPL-SCNC: 134 MMOL/L (ref 136–145)
SODIUM SERPL-SCNC: 135 MMOL/L (ref 136–145)
SP GR UR STRIP: 1.02 (ref 1–1.03)
STJ: 3.63 CM
TDI LATERAL: 0.09 M/S
TDI SEPTAL: 0.07 M/S
TDI: 0.08 M/S
TR MAX PG: 61 MMHG
TRICUSPID ANNULAR PLANE SYSTOLIC EXCURSION: 0.8 CM
TROPONIN I SERPL DL<=0.01 NG/ML-MCNC: 0.55 NG/ML (ref 0–0.03)
TROPONIN I SERPL DL<=0.01 NG/ML-MCNC: 0.6 NG/ML (ref 0–0.03)
TROPONIN I SERPL DL<=0.01 NG/ML-MCNC: 0.69 NG/ML (ref 0–0.03)
TV REST PULMONARY ARTERY PRESSURE: 69 MMHG
URN SPEC COLLECT METH UR: NORMAL
UROBILINOGEN UR STRIP-ACNC: NEGATIVE EU/DL
WBC # BLD AUTO: 20.77 K/UL (ref 3.9–12.7)
WBC # BLD AUTO: 22.13 K/UL (ref 3.9–12.7)

## 2021-04-05 PROCEDURE — 63600175 PHARM REV CODE 636 W HCPCS: Performed by: NURSE PRACTITIONER

## 2021-04-05 PROCEDURE — 96372 THER/PROPH/DIAG INJ SC/IM: CPT

## 2021-04-05 PROCEDURE — 84484 ASSAY OF TROPONIN QUANT: CPT | Mod: 91 | Performed by: NURSE PRACTITIONER

## 2021-04-05 PROCEDURE — 84145 PROCALCITONIN (PCT): CPT | Performed by: NURSE PRACTITIONER

## 2021-04-05 PROCEDURE — 87070 CULTURE OTHR SPECIMN AEROBIC: CPT | Performed by: INTERNAL MEDICINE

## 2021-04-05 PROCEDURE — S0030 INJECTION, METRONIDAZOLE: HCPCS | Performed by: INTERNAL MEDICINE

## 2021-04-05 PROCEDURE — 87040 BLOOD CULTURE FOR BACTERIA: CPT | Performed by: NURSE PRACTITIONER

## 2021-04-05 PROCEDURE — 99223 PR INITIAL HOSPITAL CARE,LEVL III: ICD-10-PCS | Mod: 25,,, | Performed by: INTERNAL MEDICINE

## 2021-04-05 PROCEDURE — 63600175 PHARM REV CODE 636 W HCPCS: Performed by: INTERNAL MEDICINE

## 2021-04-05 PROCEDURE — 83735 ASSAY OF MAGNESIUM: CPT | Performed by: NURSE PRACTITIONER

## 2021-04-05 PROCEDURE — 25000003 PHARM REV CODE 250: Performed by: INTERNAL MEDICINE

## 2021-04-05 PROCEDURE — 99223 1ST HOSP IP/OBS HIGH 75: CPT | Mod: 25,,, | Performed by: INTERNAL MEDICINE

## 2021-04-05 PROCEDURE — 96374 THER/PROPH/DIAG INJ IV PUSH: CPT

## 2021-04-05 PROCEDURE — U0002 COVID-19 LAB TEST NON-CDC: HCPCS | Performed by: STUDENT IN AN ORGANIZED HEALTH CARE EDUCATION/TRAINING PROGRAM

## 2021-04-05 PROCEDURE — 21400001 HC TELEMETRY ROOM

## 2021-04-05 PROCEDURE — 36415 COLL VENOUS BLD VENIPUNCTURE: CPT | Performed by: NURSE PRACTITIONER

## 2021-04-05 PROCEDURE — 83605 ASSAY OF LACTIC ACID: CPT | Performed by: NURSE PRACTITIONER

## 2021-04-05 PROCEDURE — 86140 C-REACTIVE PROTEIN: CPT | Performed by: NURSE PRACTITIONER

## 2021-04-05 PROCEDURE — 99223 1ST HOSP IP/OBS HIGH 75: CPT | Mod: ,,, | Performed by: INTERNAL MEDICINE

## 2021-04-05 PROCEDURE — 87077 CULTURE AEROBIC IDENTIFY: CPT | Performed by: INTERNAL MEDICINE

## 2021-04-05 PROCEDURE — 99223 1ST HOSP IP/OBS HIGH 75: CPT | Mod: ,,, | Performed by: PODIATRIST

## 2021-04-05 PROCEDURE — 25000003 PHARM REV CODE 250: Performed by: NURSE PRACTITIONER

## 2021-04-05 PROCEDURE — 81003 URINALYSIS AUTO W/O SCOPE: CPT | Performed by: STUDENT IN AN ORGANIZED HEALTH CARE EDUCATION/TRAINING PROGRAM

## 2021-04-05 PROCEDURE — 51702 INSERT TEMP BLADDER CATH: CPT

## 2021-04-05 PROCEDURE — 99223 PR INITIAL HOSPITAL CARE,LEVL III: ICD-10-PCS | Mod: ,,, | Performed by: INTERNAL MEDICINE

## 2021-04-05 PROCEDURE — 85651 RBC SED RATE NONAUTOMATED: CPT | Performed by: NURSE PRACTITIONER

## 2021-04-05 PROCEDURE — 87186 SC STD MICRODIL/AGAR DIL: CPT | Performed by: INTERNAL MEDICINE

## 2021-04-05 PROCEDURE — 80053 COMPREHEN METABOLIC PANEL: CPT | Performed by: NURSE PRACTITIONER

## 2021-04-05 PROCEDURE — 99223 PR INITIAL HOSPITAL CARE,LEVL III: ICD-10-PCS | Mod: ,,, | Performed by: PODIATRIST

## 2021-04-05 PROCEDURE — 85025 COMPLETE CBC W/AUTO DIFF WBC: CPT | Performed by: NURSE PRACTITIONER

## 2021-04-05 PROCEDURE — 96376 TX/PRO/DX INJ SAME DRUG ADON: CPT

## 2021-04-05 PROCEDURE — 94761 N-INVAS EAR/PLS OXIMETRY MLT: CPT

## 2021-04-05 RX ORDER — CEFEPIME HYDROCHLORIDE 1 G/50ML
2 INJECTION, SOLUTION INTRAVENOUS
Status: DISCONTINUED | OUTPATIENT
Start: 2021-04-05 | End: 2021-04-09

## 2021-04-05 RX ORDER — ASPIRIN 81 MG/1
81 TABLET ORAL EVERY MORNING
Status: DISCONTINUED | OUTPATIENT
Start: 2021-04-05 | End: 2021-04-12 | Stop reason: HOSPADM

## 2021-04-05 RX ORDER — TIMOLOL MALEATE 5 MG/ML
1 SOLUTION/ DROPS OPHTHALMIC EVERY 12 HOURS
Status: DISCONTINUED | OUTPATIENT
Start: 2021-04-05 | End: 2021-04-12 | Stop reason: HOSPADM

## 2021-04-05 RX ORDER — ONDANSETRON 2 MG/ML
4 INJECTION INTRAMUSCULAR; INTRAVENOUS EVERY 6 HOURS PRN
Status: DISCONTINUED | OUTPATIENT
Start: 2021-04-05 | End: 2021-04-12 | Stop reason: HOSPADM

## 2021-04-05 RX ORDER — HYDRALAZINE HYDROCHLORIDE 20 MG/ML
10 INJECTION INTRAMUSCULAR; INTRAVENOUS EVERY 6 HOURS PRN
Status: DISCONTINUED | OUTPATIENT
Start: 2021-04-05 | End: 2021-04-12 | Stop reason: HOSPADM

## 2021-04-05 RX ORDER — MUPIROCIN 20 MG/G
OINTMENT TOPICAL 2 TIMES DAILY
Status: COMPLETED | OUTPATIENT
Start: 2021-04-05 | End: 2021-04-10

## 2021-04-05 RX ORDER — GLUCAGON 1 MG
1 KIT INJECTION
Status: DISCONTINUED | OUTPATIENT
Start: 2021-04-05 | End: 2021-04-12 | Stop reason: HOSPADM

## 2021-04-05 RX ORDER — BRIMONIDINE TARTRATE 2 MG/ML
1 SOLUTION/ DROPS OPHTHALMIC EVERY 12 HOURS
Status: DISCONTINUED | OUTPATIENT
Start: 2021-04-05 | End: 2021-04-12 | Stop reason: HOSPADM

## 2021-04-05 RX ORDER — PANTOPRAZOLE SODIUM 40 MG/1
40 TABLET, DELAYED RELEASE ORAL DAILY
Status: DISCONTINUED | OUTPATIENT
Start: 2021-04-05 | End: 2021-04-12 | Stop reason: HOSPADM

## 2021-04-05 RX ORDER — FUROSEMIDE 10 MG/ML
60 INJECTION INTRAMUSCULAR; INTRAVENOUS EVERY 8 HOURS
Status: DISCONTINUED | OUTPATIENT
Start: 2021-04-05 | End: 2021-04-07

## 2021-04-05 RX ORDER — METRONIDAZOLE 500 MG/100ML
500 INJECTION, SOLUTION INTRAVENOUS
Status: DISCONTINUED | OUTPATIENT
Start: 2021-04-05 | End: 2021-04-07

## 2021-04-05 RX ORDER — SIMVASTATIN 5 MG/1
10 TABLET, FILM COATED ORAL NIGHTLY
Status: DISCONTINUED | OUTPATIENT
Start: 2021-04-05 | End: 2021-04-12 | Stop reason: HOSPADM

## 2021-04-05 RX ORDER — SODIUM BICARBONATE 650 MG/1
650 TABLET ORAL 3 TIMES DAILY
Status: DISCONTINUED | OUTPATIENT
Start: 2021-04-05 | End: 2021-04-12 | Stop reason: HOSPADM

## 2021-04-05 RX ORDER — INSULIN ASPART 100 [IU]/ML
1-10 INJECTION, SOLUTION INTRAVENOUS; SUBCUTANEOUS EVERY 6 HOURS PRN
Status: DISCONTINUED | OUTPATIENT
Start: 2021-04-05 | End: 2021-04-12 | Stop reason: HOSPADM

## 2021-04-05 RX ORDER — FUROSEMIDE 10 MG/ML
40 INJECTION INTRAMUSCULAR; INTRAVENOUS 2 TIMES DAILY
Status: DISCONTINUED | OUTPATIENT
Start: 2021-04-05 | End: 2021-04-05

## 2021-04-05 RX ORDER — ACETAMINOPHEN 325 MG/1
650 TABLET ORAL EVERY 6 HOURS PRN
Status: DISCONTINUED | OUTPATIENT
Start: 2021-04-05 | End: 2021-04-12 | Stop reason: HOSPADM

## 2021-04-05 RX ORDER — BRIMONIDINE TARTRATE AND TIMOLOL MALEATE 2; 5 MG/ML; MG/ML
1 SOLUTION OPHTHALMIC EVERY 12 HOURS
Status: DISCONTINUED | OUTPATIENT
Start: 2021-04-05 | End: 2021-04-05

## 2021-04-05 RX ADMIN — ASPIRIN 81 MG: 81 TABLET, COATED ORAL at 06:04

## 2021-04-05 RX ADMIN — CEFEPIME 2 G: 2 INJECTION, POWDER, FOR SOLUTION INTRAVENOUS at 04:04

## 2021-04-05 RX ADMIN — VANCOMYCIN HYDROCHLORIDE 1750 MG: 10 INJECTION, POWDER, LYOPHILIZED, FOR SOLUTION INTRAVENOUS at 06:04

## 2021-04-05 RX ADMIN — SIMVASTATIN 10 MG: 5 TABLET, FILM COATED ORAL at 09:04

## 2021-04-05 RX ADMIN — FUROSEMIDE 40 MG: 10 INJECTION, SOLUTION INTRAMUSCULAR; INTRAVENOUS at 08:04

## 2021-04-05 RX ADMIN — SODIUM BICARBONATE 650 MG: 650 TABLET ORAL at 09:04

## 2021-04-05 RX ADMIN — SIMVASTATIN 10 MG: 5 TABLET, FILM COATED ORAL at 04:04

## 2021-04-05 RX ADMIN — METRONIDAZOLE 500 MG: 500 INJECTION, SOLUTION INTRAVENOUS at 03:04

## 2021-04-05 RX ADMIN — SODIUM BICARBONATE 650 MG: 650 TABLET ORAL at 03:04

## 2021-04-05 RX ADMIN — TIMOLOL MALEATE 1 DROP: 5 SOLUTION OPHTHALMIC at 08:04

## 2021-04-05 RX ADMIN — FUROSEMIDE 60 MG: 10 INJECTION, SOLUTION INTRAMUSCULAR; INTRAVENOUS at 09:04

## 2021-04-05 RX ADMIN — TIMOLOL MALEATE 1 DROP: 5 SOLUTION OPHTHALMIC at 09:04

## 2021-04-05 RX ADMIN — GLUCAGON HYDROCHLORIDE 1 MG: KIT at 11:04

## 2021-04-05 RX ADMIN — FUROSEMIDE 40 MG: 10 INJECTION, SOLUTION INTRAMUSCULAR; INTRAVENOUS at 02:04

## 2021-04-05 RX ADMIN — MUPIROCIN: 20 OINTMENT TOPICAL at 09:04

## 2021-04-05 RX ADMIN — SODIUM BICARBONATE 650 MG: 650 TABLET ORAL at 08:04

## 2021-04-05 RX ADMIN — BIMATOPROST 1 DROP: 0.1 SOLUTION/ DROPS OPHTHALMIC at 09:04

## 2021-04-05 RX ADMIN — BIMATOPROST 1 DROP: 0.1 SOLUTION/ DROPS OPHTHALMIC at 04:04

## 2021-04-05 RX ADMIN — BRIMONIDINE TARTRATE 1 DROP: 2 SOLUTION OPHTHALMIC at 09:04

## 2021-04-05 RX ADMIN — PANTOPRAZOLE SODIUM 40 MG: 40 TABLET, DELAYED RELEASE ORAL at 08:04

## 2021-04-05 RX ADMIN — METRONIDAZOLE 500 MG: 500 INJECTION, SOLUTION INTRAVENOUS at 11:04

## 2021-04-06 ENCOUNTER — DOCUMENTATION ONLY (OUTPATIENT)
Dept: TRANSPLANT | Facility: CLINIC | Age: 66
End: 2021-04-06

## 2021-04-06 PROBLEM — M86.9 OSTEOMYELITIS OF RIGHT FOOT: Status: ACTIVE | Noted: 2021-04-06

## 2021-04-06 LAB
ALBUMIN SERPL BCP-MCNC: 1.9 G/DL (ref 3.5–5.2)
ALP SERPL-CCNC: 92 U/L (ref 55–135)
ALT SERPL W/O P-5'-P-CCNC: 14 U/L (ref 10–44)
ANION GAP SERPL CALC-SCNC: 9 MMOL/L (ref 8–16)
AST SERPL-CCNC: 58 U/L (ref 10–40)
BASOPHILS # BLD AUTO: 0.04 K/UL (ref 0–0.2)
BASOPHILS NFR BLD: 0.3 % (ref 0–1.9)
BILIRUB SERPL-MCNC: 0.8 MG/DL (ref 0.1–1)
BUN SERPL-MCNC: 84 MG/DL (ref 8–23)
CALCIUM SERPL-MCNC: 7.9 MG/DL (ref 8.7–10.5)
CHLORIDE SERPL-SCNC: 103 MMOL/L (ref 95–110)
CO2 SERPL-SCNC: 23 MMOL/L (ref 23–29)
CREAT SERPL-MCNC: 4.1 MG/DL (ref 0.5–1.4)
DIFFERENTIAL METHOD: ABNORMAL
EOSINOPHIL # BLD AUTO: 0.1 K/UL (ref 0–0.5)
EOSINOPHIL NFR BLD: 1.1 % (ref 0–8)
ERYTHROCYTE [DISTWIDTH] IN BLOOD BY AUTOMATED COUNT: 23.1 % (ref 11.5–14.5)
EST. GFR  (AFRICAN AMERICAN): 16 ML/MIN/1.73 M^2
EST. GFR  (NON AFRICAN AMERICAN): 14 ML/MIN/1.73 M^2
GLUCOSE SERPL-MCNC: 78 MG/DL (ref 70–110)
HCT VFR BLD AUTO: 29.9 % (ref 40–54)
HGB BLD-MCNC: 8.7 G/DL (ref 14–18)
IMM GRANULOCYTES # BLD AUTO: 0.09 K/UL (ref 0–0.04)
IMM GRANULOCYTES NFR BLD AUTO: 0.7 % (ref 0–0.5)
LYMPHOCYTES # BLD AUTO: 1.2 K/UL (ref 1–4.8)
LYMPHOCYTES NFR BLD: 9.4 % (ref 18–48)
MAGNESIUM SERPL-MCNC: 1.7 MG/DL (ref 1.6–2.6)
MCH RBC QN AUTO: 21.3 PG (ref 27–31)
MCHC RBC AUTO-ENTMCNC: 29.1 G/DL (ref 32–36)
MCV RBC AUTO: 73 FL (ref 82–98)
MONOCYTES # BLD AUTO: 1.3 K/UL (ref 0.3–1)
MONOCYTES NFR BLD: 9.7 % (ref 4–15)
NEUTROPHILS # BLD AUTO: 10.3 K/UL (ref 1.8–7.7)
NEUTROPHILS NFR BLD: 78.8 % (ref 38–73)
NRBC BLD-RTO: 1 /100 WBC
PLATELET # BLD AUTO: 117 K/UL (ref 150–450)
PMV BLD AUTO: ABNORMAL FL (ref 9.2–12.9)
POCT GLUCOSE: 166 MG/DL (ref 70–110)
POCT GLUCOSE: 170 MG/DL (ref 70–110)
POCT GLUCOSE: 91 MG/DL (ref 70–110)
POTASSIUM SERPL-SCNC: 4.4 MMOL/L (ref 3.5–5.1)
PROT SERPL-MCNC: 6 G/DL (ref 6–8.4)
RBC # BLD AUTO: 4.09 M/UL (ref 4.6–6.2)
SODIUM SERPL-SCNC: 135 MMOL/L (ref 136–145)
VANCOMYCIN SERPL-MCNC: 20.4 UG/ML
WBC # BLD AUTO: 13.1 K/UL (ref 3.9–12.7)

## 2021-04-06 PROCEDURE — 99232 PR SUBSEQUENT HOSPITAL CARE,LEVL II: ICD-10-PCS | Mod: ,,, | Performed by: INTERNAL MEDICINE

## 2021-04-06 PROCEDURE — 25000003 PHARM REV CODE 250: Performed by: PHYSICIAN ASSISTANT

## 2021-04-06 PROCEDURE — S0030 INJECTION, METRONIDAZOLE: HCPCS | Performed by: INTERNAL MEDICINE

## 2021-04-06 PROCEDURE — 80202 ASSAY OF VANCOMYCIN: CPT | Performed by: INTERNAL MEDICINE

## 2021-04-06 PROCEDURE — 21400001 HC TELEMETRY ROOM

## 2021-04-06 PROCEDURE — 63600175 PHARM REV CODE 636 W HCPCS: Performed by: INTERNAL MEDICINE

## 2021-04-06 PROCEDURE — 83735 ASSAY OF MAGNESIUM: CPT | Performed by: NURSE PRACTITIONER

## 2021-04-06 PROCEDURE — 80053 COMPREHEN METABOLIC PANEL: CPT | Performed by: NURSE PRACTITIONER

## 2021-04-06 PROCEDURE — 93005 ELECTROCARDIOGRAM TRACING: CPT

## 2021-04-06 PROCEDURE — 85025 COMPLETE CBC W/AUTO DIFF WBC: CPT | Performed by: NURSE PRACTITIONER

## 2021-04-06 PROCEDURE — 25000003 PHARM REV CODE 250: Performed by: NURSE PRACTITIONER

## 2021-04-06 PROCEDURE — 99232 SBSQ HOSP IP/OBS MODERATE 35: CPT | Mod: ,,, | Performed by: INTERNAL MEDICINE

## 2021-04-06 PROCEDURE — 93010 EKG 12-LEAD: ICD-10-PCS | Mod: ,,, | Performed by: INTERNAL MEDICINE

## 2021-04-06 PROCEDURE — 36415 COLL VENOUS BLD VENIPUNCTURE: CPT | Performed by: NURSE PRACTITIONER

## 2021-04-06 PROCEDURE — 25000003 PHARM REV CODE 250: Performed by: PODIATRIST

## 2021-04-06 PROCEDURE — 94761 N-INVAS EAR/PLS OXIMETRY MLT: CPT

## 2021-04-06 PROCEDURE — 25000003 PHARM REV CODE 250: Performed by: INTERNAL MEDICINE

## 2021-04-06 PROCEDURE — 93010 ELECTROCARDIOGRAM REPORT: CPT | Mod: ,,, | Performed by: INTERNAL MEDICINE

## 2021-04-06 RX ORDER — METOPROLOL TARTRATE 25 MG/1
12.5 TABLET ORAL 2 TIMES DAILY
Status: DISCONTINUED | OUTPATIENT
Start: 2021-04-06 | End: 2021-04-07

## 2021-04-06 RX ADMIN — FUROSEMIDE 60 MG: 10 INJECTION, SOLUTION INTRAMUSCULAR; INTRAVENOUS at 09:04

## 2021-04-06 RX ADMIN — METRONIDAZOLE 500 MG: 500 INJECTION, SOLUTION INTRAVENOUS at 06:04

## 2021-04-06 RX ADMIN — SODIUM BICARBONATE 650 MG: 650 TABLET ORAL at 04:04

## 2021-04-06 RX ADMIN — SIMVASTATIN 10 MG: 5 TABLET, FILM COATED ORAL at 09:04

## 2021-04-06 RX ADMIN — BRIMONIDINE TARTRATE 1 DROP: 2 SOLUTION OPHTHALMIC at 09:04

## 2021-04-06 RX ADMIN — COLLAGENASE SANTYL: 250 OINTMENT TOPICAL at 10:04

## 2021-04-06 RX ADMIN — BRIMONIDINE TARTRATE 1 DROP: 2 SOLUTION OPHTHALMIC at 10:04

## 2021-04-06 RX ADMIN — PANTOPRAZOLE SODIUM 40 MG: 40 TABLET, DELAYED RELEASE ORAL at 10:04

## 2021-04-06 RX ADMIN — TIMOLOL MALEATE 1 DROP: 5 SOLUTION OPHTHALMIC at 10:04

## 2021-04-06 RX ADMIN — MUPIROCIN: 20 OINTMENT TOPICAL at 10:04

## 2021-04-06 RX ADMIN — FUROSEMIDE 60 MG: 10 INJECTION, SOLUTION INTRAMUSCULAR; INTRAVENOUS at 04:04

## 2021-04-06 RX ADMIN — METOPROLOL TARTRATE 12.5 MG: 25 TABLET, FILM COATED ORAL at 04:04

## 2021-04-06 RX ADMIN — CEFEPIME 2 G: 2 INJECTION, POWDER, FOR SOLUTION INTRAVENOUS at 04:04

## 2021-04-06 RX ADMIN — FUROSEMIDE 60 MG: 10 INJECTION, SOLUTION INTRAMUSCULAR; INTRAVENOUS at 05:04

## 2021-04-06 RX ADMIN — VANCOMYCIN HYDROCHLORIDE 1500 MG: 1.5 INJECTION, POWDER, LYOPHILIZED, FOR SOLUTION INTRAVENOUS at 11:04

## 2021-04-06 RX ADMIN — METRONIDAZOLE 500 MG: 500 INJECTION, SOLUTION INTRAVENOUS at 10:04

## 2021-04-06 RX ADMIN — SODIUM BICARBONATE 650 MG: 650 TABLET ORAL at 10:04

## 2021-04-06 RX ADMIN — BIMATOPROST 1 DROP: 0.1 SOLUTION/ DROPS OPHTHALMIC at 09:04

## 2021-04-06 RX ADMIN — TIMOLOL MALEATE 1 DROP: 5 SOLUTION OPHTHALMIC at 09:04

## 2021-04-06 RX ADMIN — SODIUM BICARBONATE 650 MG: 650 TABLET ORAL at 09:04

## 2021-04-06 RX ADMIN — ASPIRIN 81 MG: 81 TABLET, COATED ORAL at 10:04

## 2021-04-06 RX ADMIN — MUPIROCIN: 20 OINTMENT TOPICAL at 09:04

## 2021-04-07 LAB
ALBUMIN SERPL BCP-MCNC: 1.8 G/DL (ref 3.5–5.2)
ALP SERPL-CCNC: 84 U/L (ref 55–135)
ALT SERPL W/O P-5'-P-CCNC: 12 U/L (ref 10–44)
ANION GAP SERPL CALC-SCNC: 8 MMOL/L (ref 8–16)
AST SERPL-CCNC: 45 U/L (ref 10–40)
BASOPHILS # BLD AUTO: 0.03 K/UL (ref 0–0.2)
BASOPHILS NFR BLD: 0.3 % (ref 0–1.9)
BILIRUB SERPL-MCNC: 0.7 MG/DL (ref 0.1–1)
BUN SERPL-MCNC: 82 MG/DL (ref 8–23)
CALCIUM SERPL-MCNC: 7.6 MG/DL (ref 8.7–10.5)
CHLORIDE SERPL-SCNC: 104 MMOL/L (ref 95–110)
CO2 SERPL-SCNC: 23 MMOL/L (ref 23–29)
CREAT SERPL-MCNC: 3.6 MG/DL (ref 0.5–1.4)
DIFFERENTIAL METHOD: ABNORMAL
EOSINOPHIL # BLD AUTO: 0.1 K/UL (ref 0–0.5)
EOSINOPHIL NFR BLD: 1.1 % (ref 0–8)
ERYTHROCYTE [DISTWIDTH] IN BLOOD BY AUTOMATED COUNT: 23.2 % (ref 11.5–14.5)
EST. GFR  (AFRICAN AMERICAN): 19 ML/MIN/1.73 M^2
EST. GFR  (NON AFRICAN AMERICAN): 16 ML/MIN/1.73 M^2
GLUCOSE SERPL-MCNC: 143 MG/DL (ref 70–110)
HCT VFR BLD AUTO: 27.9 % (ref 40–54)
HGB BLD-MCNC: 8.1 G/DL (ref 14–18)
IMM GRANULOCYTES # BLD AUTO: 0.09 K/UL (ref 0–0.04)
IMM GRANULOCYTES NFR BLD AUTO: 0.8 % (ref 0–0.5)
LYMPHOCYTES # BLD AUTO: 1.3 K/UL (ref 1–4.8)
LYMPHOCYTES NFR BLD: 11.7 % (ref 18–48)
MAGNESIUM SERPL-MCNC: 1.6 MG/DL (ref 1.6–2.6)
MCH RBC QN AUTO: 21.2 PG (ref 27–31)
MCHC RBC AUTO-ENTMCNC: 29 G/DL (ref 32–36)
MCV RBC AUTO: 73 FL (ref 82–98)
MONOCYTES # BLD AUTO: 1.4 K/UL (ref 0.3–1)
MONOCYTES NFR BLD: 12.6 % (ref 4–15)
NEUTROPHILS # BLD AUTO: 8 K/UL (ref 1.8–7.7)
NEUTROPHILS NFR BLD: 73.5 % (ref 38–73)
NRBC BLD-RTO: 0 /100 WBC
PLATELET # BLD AUTO: 103 K/UL (ref 150–450)
PMV BLD AUTO: ABNORMAL FL (ref 9.2–12.9)
POCT GLUCOSE: 157 MG/DL (ref 70–110)
POCT GLUCOSE: 158 MG/DL (ref 70–110)
POCT GLUCOSE: 224 MG/DL (ref 70–110)
POCT GLUCOSE: 237 MG/DL (ref 70–110)
POTASSIUM SERPL-SCNC: 4 MMOL/L (ref 3.5–5.1)
PROT SERPL-MCNC: 5.9 G/DL (ref 6–8.4)
RBC # BLD AUTO: 3.82 M/UL (ref 4.6–6.2)
SODIUM SERPL-SCNC: 135 MMOL/L (ref 136–145)
SODIUM UR-SCNC: 37 MMOL/L (ref 20–250)
VANCOMYCIN SERPL-MCNC: 24.6 UG/ML
VANCOMYCIN SERPL-MCNC: 28.4 UG/ML
WBC # BLD AUTO: 10.91 K/UL (ref 3.9–12.7)

## 2021-04-07 PROCEDURE — 80202 ASSAY OF VANCOMYCIN: CPT | Performed by: INTERNAL MEDICINE

## 2021-04-07 PROCEDURE — 63600175 PHARM REV CODE 636 W HCPCS: Performed by: NURSE PRACTITIONER

## 2021-04-07 PROCEDURE — 99233 SBSQ HOSP IP/OBS HIGH 50: CPT | Mod: ,,, | Performed by: INTERNAL MEDICINE

## 2021-04-07 PROCEDURE — 84300 ASSAY OF URINE SODIUM: CPT | Performed by: INTERNAL MEDICINE

## 2021-04-07 PROCEDURE — 99233 PR SUBSEQUENT HOSPITAL CARE,LEVL III: ICD-10-PCS | Mod: NSCH,,, | Performed by: INTERNAL MEDICINE

## 2021-04-07 PROCEDURE — 21400001 HC TELEMETRY ROOM

## 2021-04-07 PROCEDURE — 36415 COLL VENOUS BLD VENIPUNCTURE: CPT | Performed by: INTERNAL MEDICINE

## 2021-04-07 PROCEDURE — 25000003 PHARM REV CODE 250: Performed by: NURSE PRACTITIONER

## 2021-04-07 PROCEDURE — 63600175 PHARM REV CODE 636 W HCPCS: Performed by: INTERNAL MEDICINE

## 2021-04-07 PROCEDURE — 25000003 PHARM REV CODE 250: Performed by: INTERNAL MEDICINE

## 2021-04-07 PROCEDURE — 80053 COMPREHEN METABOLIC PANEL: CPT | Performed by: NURSE PRACTITIONER

## 2021-04-07 PROCEDURE — 85025 COMPLETE CBC W/AUTO DIFF WBC: CPT | Performed by: NURSE PRACTITIONER

## 2021-04-07 PROCEDURE — 99233 SBSQ HOSP IP/OBS HIGH 50: CPT | Mod: NSCH,,, | Performed by: INTERNAL MEDICINE

## 2021-04-07 PROCEDURE — 96372 THER/PROPH/DIAG INJ SC/IM: CPT

## 2021-04-07 PROCEDURE — 36415 COLL VENOUS BLD VENIPUNCTURE: CPT | Performed by: NURSE PRACTITIONER

## 2021-04-07 PROCEDURE — S0030 INJECTION, METRONIDAZOLE: HCPCS | Performed by: INTERNAL MEDICINE

## 2021-04-07 PROCEDURE — 25000003 PHARM REV CODE 250: Performed by: PHYSICIAN ASSISTANT

## 2021-04-07 PROCEDURE — 99233 PR SUBSEQUENT HOSPITAL CARE,LEVL III: ICD-10-PCS | Mod: ,,, | Performed by: INTERNAL MEDICINE

## 2021-04-07 PROCEDURE — 83735 ASSAY OF MAGNESIUM: CPT | Performed by: NURSE PRACTITIONER

## 2021-04-07 RX ORDER — METOPROLOL TARTRATE 25 MG/1
25 TABLET, FILM COATED ORAL 2 TIMES DAILY
Status: DISCONTINUED | OUTPATIENT
Start: 2021-04-07 | End: 2021-04-12 | Stop reason: HOSPADM

## 2021-04-07 RX ORDER — CLINDAMYCIN PHOSPHATE 600 MG/50ML
600 INJECTION, SOLUTION INTRAVENOUS
Status: DISCONTINUED | OUTPATIENT
Start: 2021-04-07 | End: 2021-04-09

## 2021-04-07 RX ORDER — FUROSEMIDE 10 MG/ML
60 INJECTION INTRAMUSCULAR; INTRAVENOUS
Status: DISCONTINUED | OUTPATIENT
Start: 2021-04-08 | End: 2021-04-12 | Stop reason: HOSPADM

## 2021-04-07 RX ADMIN — METOPROLOL TARTRATE 25 MG: 25 TABLET, FILM COATED ORAL at 10:04

## 2021-04-07 RX ADMIN — INSULIN ASPART 4 UNITS: 100 INJECTION, SOLUTION INTRAVENOUS; SUBCUTANEOUS at 10:04

## 2021-04-07 RX ADMIN — ASPIRIN 81 MG: 81 TABLET, COATED ORAL at 06:04

## 2021-04-07 RX ADMIN — METRONIDAZOLE 500 MG: 500 INJECTION, SOLUTION INTRAVENOUS at 01:04

## 2021-04-07 RX ADMIN — INSULIN ASPART 4 UNITS: 100 INJECTION, SOLUTION INTRAVENOUS; SUBCUTANEOUS at 04:04

## 2021-04-07 RX ADMIN — CEFEPIME 2 G: 2 INJECTION, POWDER, FOR SOLUTION INTRAVENOUS at 03:04

## 2021-04-07 RX ADMIN — SODIUM BICARBONATE 650 MG: 650 TABLET ORAL at 03:04

## 2021-04-07 RX ADMIN — SIMVASTATIN 10 MG: 5 TABLET, FILM COATED ORAL at 10:04

## 2021-04-07 RX ADMIN — SODIUM BICARBONATE 650 MG: 650 TABLET ORAL at 10:04

## 2021-04-07 RX ADMIN — BRIMONIDINE TARTRATE 1 DROP: 2 SOLUTION OPHTHALMIC at 10:04

## 2021-04-07 RX ADMIN — TIMOLOL MALEATE 1 DROP: 5 SOLUTION OPHTHALMIC at 10:04

## 2021-04-07 RX ADMIN — BIMATOPROST 1 DROP: 0.1 SOLUTION/ DROPS OPHTHALMIC at 10:04

## 2021-04-07 RX ADMIN — MUPIROCIN: 20 OINTMENT TOPICAL at 08:04

## 2021-04-07 RX ADMIN — PANTOPRAZOLE SODIUM 40 MG: 40 TABLET, DELAYED RELEASE ORAL at 08:04

## 2021-04-07 RX ADMIN — MUPIROCIN: 20 OINTMENT TOPICAL at 10:04

## 2021-04-07 RX ADMIN — FUROSEMIDE 60 MG: 10 INJECTION, SOLUTION INTRAMUSCULAR; INTRAVENOUS at 02:04

## 2021-04-07 RX ADMIN — TIMOLOL MALEATE 1 DROP: 5 SOLUTION OPHTHALMIC at 08:04

## 2021-04-07 RX ADMIN — BRIMONIDINE TARTRATE 1 DROP: 2 SOLUTION OPHTHALMIC at 08:04

## 2021-04-07 RX ADMIN — CLINDAMYCIN IN 5 PERCENT DEXTROSE 600 MG: 12 INJECTION, SOLUTION INTRAVENOUS at 04:04

## 2021-04-07 RX ADMIN — SODIUM BICARBONATE 650 MG: 650 TABLET ORAL at 08:04

## 2021-04-07 RX ADMIN — FUROSEMIDE 60 MG: 10 INJECTION, SOLUTION INTRAMUSCULAR; INTRAVENOUS at 06:04

## 2021-04-07 RX ADMIN — CLINDAMYCIN IN 5 PERCENT DEXTROSE 600 MG: 12 INJECTION, SOLUTION INTRAVENOUS at 07:04

## 2021-04-07 RX ADMIN — METOPROLOL TARTRATE 12.5 MG: 25 TABLET, FILM COATED ORAL at 08:04

## 2021-04-07 RX ADMIN — COLLAGENASE SANTYL: 250 OINTMENT TOPICAL at 08:04

## 2021-04-08 LAB
ALBUMIN SERPL BCP-MCNC: 1.7 G/DL (ref 3.5–5.2)
ALP SERPL-CCNC: 77 U/L (ref 55–135)
ALT SERPL W/O P-5'-P-CCNC: 12 U/L (ref 10–44)
ANION GAP SERPL CALC-SCNC: 9 MMOL/L (ref 8–16)
AST SERPL-CCNC: 38 U/L (ref 10–40)
BACTERIA SPEC AEROBE CULT: ABNORMAL
BASOPHILS # BLD AUTO: 0.03 K/UL (ref 0–0.2)
BASOPHILS NFR BLD: 0.3 % (ref 0–1.9)
BILIRUB SERPL-MCNC: 0.6 MG/DL (ref 0.1–1)
BUN SERPL-MCNC: 83 MG/DL (ref 8–23)
CALCIUM SERPL-MCNC: 7.6 MG/DL (ref 8.7–10.5)
CHLORIDE SERPL-SCNC: 105 MMOL/L (ref 95–110)
CO2 SERPL-SCNC: 21 MMOL/L (ref 23–29)
CREAT SERPL-MCNC: 3.4 MG/DL (ref 0.5–1.4)
DIFFERENTIAL METHOD: ABNORMAL
EOSINOPHIL # BLD AUTO: 0.1 K/UL (ref 0–0.5)
EOSINOPHIL NFR BLD: 1.3 % (ref 0–8)
ERYTHROCYTE [DISTWIDTH] IN BLOOD BY AUTOMATED COUNT: 23.2 % (ref 11.5–14.5)
EST. GFR  (AFRICAN AMERICAN): 20 ML/MIN/1.73 M^2
EST. GFR  (NON AFRICAN AMERICAN): 18 ML/MIN/1.73 M^2
GLUCOSE SERPL-MCNC: 149 MG/DL (ref 70–110)
HCT VFR BLD AUTO: 25 % (ref 40–54)
HGB BLD-MCNC: 7.4 G/DL (ref 14–18)
IMM GRANULOCYTES # BLD AUTO: 0.1 K/UL (ref 0–0.04)
IMM GRANULOCYTES NFR BLD AUTO: 1 % (ref 0–0.5)
LYMPHOCYTES # BLD AUTO: 1.4 K/UL (ref 1–4.8)
LYMPHOCYTES NFR BLD: 13.7 % (ref 18–48)
MAGNESIUM SERPL-MCNC: 1.6 MG/DL (ref 1.6–2.6)
MCH RBC QN AUTO: 21.5 PG (ref 27–31)
MCHC RBC AUTO-ENTMCNC: 29.6 G/DL (ref 32–36)
MCV RBC AUTO: 73 FL (ref 82–98)
MONOCYTES # BLD AUTO: 1.4 K/UL (ref 0.3–1)
MONOCYTES NFR BLD: 13.4 % (ref 4–15)
NEUTROPHILS # BLD AUTO: 7.2 K/UL (ref 1.8–7.7)
NEUTROPHILS NFR BLD: 70.3 % (ref 38–73)
NRBC BLD-RTO: 0 /100 WBC
PLATELET # BLD AUTO: 115 K/UL (ref 150–450)
PMV BLD AUTO: ABNORMAL FL (ref 9.2–12.9)
POCT GLUCOSE: 168 MG/DL (ref 70–110)
POTASSIUM SERPL-SCNC: 4 MMOL/L (ref 3.5–5.1)
PROCALCITONIN SERPL IA-MCNC: 4.67 NG/ML
PROT SERPL-MCNC: 5.9 G/DL (ref 6–8.4)
RBC # BLD AUTO: 3.44 M/UL (ref 4.6–6.2)
SODIUM SERPL-SCNC: 135 MMOL/L (ref 136–145)
VANCOMYCIN SERPL-MCNC: 19.2 UG/ML
VANCOMYCIN SERPL-MCNC: 22.2 UG/ML
WBC # BLD AUTO: 10.27 K/UL (ref 3.9–12.7)

## 2021-04-08 PROCEDURE — 63600175 PHARM REV CODE 636 W HCPCS: Performed by: INTERNAL MEDICINE

## 2021-04-08 PROCEDURE — 36415 COLL VENOUS BLD VENIPUNCTURE: CPT | Performed by: INTERNAL MEDICINE

## 2021-04-08 PROCEDURE — 99232 SBSQ HOSP IP/OBS MODERATE 35: CPT | Mod: ,,, | Performed by: PHYSICIAN ASSISTANT

## 2021-04-08 PROCEDURE — 99232 PR SUBSEQUENT HOSPITAL CARE,LEVL II: ICD-10-PCS | Mod: ,,, | Performed by: INTERNAL MEDICINE

## 2021-04-08 PROCEDURE — 99233 PR SUBSEQUENT HOSPITAL CARE,LEVL III: ICD-10-PCS | Mod: NSCH,,, | Performed by: INTERNAL MEDICINE

## 2021-04-08 PROCEDURE — 80202 ASSAY OF VANCOMYCIN: CPT | Performed by: INTERNAL MEDICINE

## 2021-04-08 PROCEDURE — 84145 PROCALCITONIN (PCT): CPT | Performed by: INTERNAL MEDICINE

## 2021-04-08 PROCEDURE — 99232 SBSQ HOSP IP/OBS MODERATE 35: CPT | Mod: ,,, | Performed by: INTERNAL MEDICINE

## 2021-04-08 PROCEDURE — 21400001 HC TELEMETRY ROOM

## 2021-04-08 PROCEDURE — 99233 SBSQ HOSP IP/OBS HIGH 50: CPT | Mod: NSCH,,, | Performed by: INTERNAL MEDICINE

## 2021-04-08 PROCEDURE — 83735 ASSAY OF MAGNESIUM: CPT | Performed by: NURSE PRACTITIONER

## 2021-04-08 PROCEDURE — 85025 COMPLETE CBC W/AUTO DIFF WBC: CPT | Performed by: NURSE PRACTITIONER

## 2021-04-08 PROCEDURE — 80053 COMPREHEN METABOLIC PANEL: CPT | Performed by: NURSE PRACTITIONER

## 2021-04-08 PROCEDURE — 25000003 PHARM REV CODE 250: Performed by: NURSE PRACTITIONER

## 2021-04-08 PROCEDURE — 99232 PR SUBSEQUENT HOSPITAL CARE,LEVL II: ICD-10-PCS | Mod: ,,, | Performed by: PHYSICIAN ASSISTANT

## 2021-04-08 PROCEDURE — 25000003 PHARM REV CODE 250: Performed by: PHYSICIAN ASSISTANT

## 2021-04-08 RX ADMIN — CEFEPIME 2 G: 2 INJECTION, POWDER, FOR SOLUTION INTRAVENOUS at 03:04

## 2021-04-08 RX ADMIN — SODIUM BICARBONATE 650 MG: 650 TABLET ORAL at 10:04

## 2021-04-08 RX ADMIN — SODIUM BICARBONATE 650 MG: 650 TABLET ORAL at 08:04

## 2021-04-08 RX ADMIN — INSULIN ASPART 2 UNITS: 100 INJECTION, SOLUTION INTRAVENOUS; SUBCUTANEOUS at 11:04

## 2021-04-08 RX ADMIN — SODIUM BICARBONATE 650 MG: 650 TABLET ORAL at 02:04

## 2021-04-08 RX ADMIN — TIMOLOL MALEATE 1 DROP: 5 SOLUTION OPHTHALMIC at 08:04

## 2021-04-08 RX ADMIN — MUPIROCIN: 20 OINTMENT TOPICAL at 10:04

## 2021-04-08 RX ADMIN — TIMOLOL MALEATE 1 DROP: 5 SOLUTION OPHTHALMIC at 10:04

## 2021-04-08 RX ADMIN — COLLAGENASE SANTYL: 250 OINTMENT TOPICAL at 08:04

## 2021-04-08 RX ADMIN — SIMVASTATIN 10 MG: 5 TABLET, FILM COATED ORAL at 10:04

## 2021-04-08 RX ADMIN — BIMATOPROST 1 DROP: 0.1 SOLUTION/ DROPS OPHTHALMIC at 10:04

## 2021-04-08 RX ADMIN — ASPIRIN 81 MG: 81 TABLET, COATED ORAL at 08:04

## 2021-04-08 RX ADMIN — FUROSEMIDE 60 MG: 10 INJECTION, SOLUTION INTRAMUSCULAR; INTRAVENOUS at 03:04

## 2021-04-08 RX ADMIN — BRIMONIDINE TARTRATE 1 DROP: 2 SOLUTION OPHTHALMIC at 10:04

## 2021-04-08 RX ADMIN — BRIMONIDINE TARTRATE 1 DROP: 2 SOLUTION OPHTHALMIC at 08:04

## 2021-04-08 RX ADMIN — MUPIROCIN: 20 OINTMENT TOPICAL at 08:04

## 2021-04-08 RX ADMIN — METOPROLOL TARTRATE 25 MG: 25 TABLET, FILM COATED ORAL at 08:04

## 2021-04-08 RX ADMIN — PANTOPRAZOLE SODIUM 40 MG: 40 TABLET, DELAYED RELEASE ORAL at 08:04

## 2021-04-08 RX ADMIN — METOPROLOL TARTRATE 25 MG: 25 TABLET, FILM COATED ORAL at 10:04

## 2021-04-09 PROBLEM — R79.89 ELEVATED TROPONIN: Status: RESOLVED | Noted: 2019-04-02 | Resolved: 2021-04-09

## 2021-04-09 LAB
ALBUMIN SERPL BCP-MCNC: 1.7 G/DL (ref 3.5–5.2)
ALP SERPL-CCNC: 71 U/L (ref 55–135)
ALT SERPL W/O P-5'-P-CCNC: 13 U/L (ref 10–44)
ANION GAP SERPL CALC-SCNC: 8 MMOL/L (ref 8–16)
AST SERPL-CCNC: 37 U/L (ref 10–40)
BASOPHILS # BLD AUTO: 0.06 K/UL (ref 0–0.2)
BASOPHILS NFR BLD: 0.7 % (ref 0–1.9)
BILIRUB SERPL-MCNC: 0.6 MG/DL (ref 0.1–1)
BUN SERPL-MCNC: 80 MG/DL (ref 8–23)
CALCIUM SERPL-MCNC: 7.8 MG/DL (ref 8.7–10.5)
CHLORIDE SERPL-SCNC: 104 MMOL/L (ref 95–110)
CO2 SERPL-SCNC: 23 MMOL/L (ref 23–29)
CREAT SERPL-MCNC: 3.2 MG/DL (ref 0.5–1.4)
DIFFERENTIAL METHOD: ABNORMAL
EOSINOPHIL # BLD AUTO: 0.2 K/UL (ref 0–0.5)
EOSINOPHIL NFR BLD: 1.8 % (ref 0–8)
ERYTHROCYTE [DISTWIDTH] IN BLOOD BY AUTOMATED COUNT: 23.8 % (ref 11.5–14.5)
EST. GFR  (AFRICAN AMERICAN): 22 ML/MIN/1.73 M^2
EST. GFR  (NON AFRICAN AMERICAN): 19 ML/MIN/1.73 M^2
GLUCOSE SERPL-MCNC: 158 MG/DL (ref 70–110)
HCT VFR BLD AUTO: 24.6 % (ref 40–54)
HGB BLD-MCNC: 7.2 G/DL (ref 14–18)
IMM GRANULOCYTES # BLD AUTO: 0.07 K/UL (ref 0–0.04)
IMM GRANULOCYTES NFR BLD AUTO: 0.9 % (ref 0–0.5)
LYMPHOCYTES # BLD AUTO: 1.1 K/UL (ref 1–4.8)
LYMPHOCYTES NFR BLD: 12.9 % (ref 18–48)
MAGNESIUM SERPL-MCNC: 1.6 MG/DL (ref 1.6–2.6)
MCH RBC QN AUTO: 21.4 PG (ref 27–31)
MCHC RBC AUTO-ENTMCNC: 29.3 G/DL (ref 32–36)
MCV RBC AUTO: 73 FL (ref 82–98)
MONOCYTES # BLD AUTO: 1.1 K/UL (ref 0.3–1)
MONOCYTES NFR BLD: 12.9 % (ref 4–15)
NEUTROPHILS # BLD AUTO: 5.8 K/UL (ref 1.8–7.7)
NEUTROPHILS NFR BLD: 70.8 % (ref 38–73)
NRBC BLD-RTO: 0 /100 WBC
PLATELET # BLD AUTO: 123 K/UL (ref 150–450)
PMV BLD AUTO: ABNORMAL FL (ref 9.2–12.9)
POCT GLUCOSE: 160 MG/DL (ref 70–110)
POCT GLUCOSE: 173 MG/DL (ref 70–110)
POCT GLUCOSE: 230 MG/DL (ref 70–110)
POCT GLUCOSE: 264 MG/DL (ref 70–110)
POTASSIUM SERPL-SCNC: 4.2 MMOL/L (ref 3.5–5.1)
PROT SERPL-MCNC: 6 G/DL (ref 6–8.4)
RBC # BLD AUTO: 3.36 M/UL (ref 4.6–6.2)
SODIUM SERPL-SCNC: 135 MMOL/L (ref 136–145)
WBC # BLD AUTO: 8.19 K/UL (ref 3.9–12.7)

## 2021-04-09 PROCEDURE — 83735 ASSAY OF MAGNESIUM: CPT | Performed by: NURSE PRACTITIONER

## 2021-04-09 PROCEDURE — 63600175 PHARM REV CODE 636 W HCPCS: Performed by: INTERNAL MEDICINE

## 2021-04-09 PROCEDURE — 99232 SBSQ HOSP IP/OBS MODERATE 35: CPT | Mod: ,,, | Performed by: INTERNAL MEDICINE

## 2021-04-09 PROCEDURE — 25000003 PHARM REV CODE 250: Performed by: INTERNAL MEDICINE

## 2021-04-09 PROCEDURE — 25000003 PHARM REV CODE 250: Performed by: NURSE PRACTITIONER

## 2021-04-09 PROCEDURE — 80053 COMPREHEN METABOLIC PANEL: CPT | Performed by: NURSE PRACTITIONER

## 2021-04-09 PROCEDURE — S0030 INJECTION, METRONIDAZOLE: HCPCS | Performed by: INTERNAL MEDICINE

## 2021-04-09 PROCEDURE — 85025 COMPLETE CBC W/AUTO DIFF WBC: CPT | Performed by: NURSE PRACTITIONER

## 2021-04-09 PROCEDURE — 96372 THER/PROPH/DIAG INJ SC/IM: CPT

## 2021-04-09 PROCEDURE — 25000003 PHARM REV CODE 250: Performed by: PHYSICIAN ASSISTANT

## 2021-04-09 PROCEDURE — 94761 N-INVAS EAR/PLS OXIMETRY MLT: CPT

## 2021-04-09 PROCEDURE — 36415 COLL VENOUS BLD VENIPUNCTURE: CPT | Performed by: NURSE PRACTITIONER

## 2021-04-09 PROCEDURE — 99232 PR SUBSEQUENT HOSPITAL CARE,LEVL II: ICD-10-PCS | Mod: ,,, | Performed by: INTERNAL MEDICINE

## 2021-04-09 PROCEDURE — 21400001 HC TELEMETRY ROOM

## 2021-04-09 RX ORDER — DIPHENHYDRAMINE HCL 25 MG
25 CAPSULE ORAL EVERY 6 HOURS PRN
Status: DISCONTINUED | OUTPATIENT
Start: 2021-04-09 | End: 2021-04-12 | Stop reason: HOSPADM

## 2021-04-09 RX ORDER — METRONIDAZOLE 500 MG/100ML
500 INJECTION, SOLUTION INTRAVENOUS
Status: DISCONTINUED | OUTPATIENT
Start: 2021-04-09 | End: 2021-04-12

## 2021-04-09 RX ORDER — VANCOMYCIN HCL IN 5 % DEXTROSE 1G/250ML
1000 PLASTIC BAG, INJECTION (ML) INTRAVENOUS ONCE
Status: COMPLETED | OUTPATIENT
Start: 2021-04-09 | End: 2021-04-09

## 2021-04-09 RX ADMIN — INSULIN ASPART 4 UNITS: 100 INJECTION, SOLUTION INTRAVENOUS; SUBCUTANEOUS at 11:04

## 2021-04-09 RX ADMIN — METOPROLOL TARTRATE 25 MG: 25 TABLET, FILM COATED ORAL at 08:04

## 2021-04-09 RX ADMIN — MUPIROCIN: 20 OINTMENT TOPICAL at 08:04

## 2021-04-09 RX ADMIN — FUROSEMIDE 60 MG: 10 INJECTION, SOLUTION INTRAMUSCULAR; INTRAVENOUS at 03:04

## 2021-04-09 RX ADMIN — TIMOLOL MALEATE 1 DROP: 5 SOLUTION OPHTHALMIC at 08:04

## 2021-04-09 RX ADMIN — CEFTRIAXONE 2 G: 2 INJECTION, SOLUTION INTRAVENOUS at 06:04

## 2021-04-09 RX ADMIN — CLINDAMYCIN IN 5 PERCENT DEXTROSE 600 MG: 12 INJECTION, SOLUTION INTRAVENOUS at 12:04

## 2021-04-09 RX ADMIN — BRIMONIDINE TARTRATE 1 DROP: 2 SOLUTION OPHTHALMIC at 08:04

## 2021-04-09 RX ADMIN — SODIUM BICARBONATE 650 MG: 650 TABLET ORAL at 08:04

## 2021-04-09 RX ADMIN — DIPHENHYDRAMINE HYDROCHLORIDE 25 MG: 25 CAPSULE ORAL at 12:04

## 2021-04-09 RX ADMIN — METRONIDAZOLE 500 MG: 500 INJECTION, SOLUTION INTRAVENOUS at 03:04

## 2021-04-09 RX ADMIN — FUROSEMIDE 60 MG: 10 INJECTION, SOLUTION INTRAMUSCULAR; INTRAVENOUS at 02:04

## 2021-04-09 RX ADMIN — SODIUM BICARBONATE 650 MG: 650 TABLET ORAL at 03:04

## 2021-04-09 RX ADMIN — METRONIDAZOLE 500 MG: 500 INJECTION, SOLUTION INTRAVENOUS at 10:04

## 2021-04-09 RX ADMIN — BIMATOPROST 1 DROP: 0.1 SOLUTION/ DROPS OPHTHALMIC at 08:04

## 2021-04-09 RX ADMIN — VANCOMYCIN HYDROCHLORIDE 1000 MG: 1 INJECTION, POWDER, LYOPHILIZED, FOR SOLUTION INTRAVENOUS at 02:04

## 2021-04-09 RX ADMIN — ASPIRIN 81 MG: 81 TABLET, COATED ORAL at 06:04

## 2021-04-09 RX ADMIN — INSULIN ASPART 6 UNITS: 100 INJECTION, SOLUTION INTRAVENOUS; SUBCUTANEOUS at 04:04

## 2021-04-09 RX ADMIN — PANTOPRAZOLE SODIUM 40 MG: 40 TABLET, DELAYED RELEASE ORAL at 08:04

## 2021-04-09 RX ADMIN — SIMVASTATIN 10 MG: 5 TABLET, FILM COATED ORAL at 08:04

## 2021-04-09 RX ADMIN — COLLAGENASE SANTYL: 250 OINTMENT TOPICAL at 08:04

## 2021-04-09 RX ADMIN — METRONIDAZOLE 500 MG: 500 INJECTION, SOLUTION INTRAVENOUS at 08:04

## 2021-04-10 LAB
ALBUMIN SERPL BCP-MCNC: 1.8 G/DL (ref 3.5–5.2)
ALP SERPL-CCNC: 73 U/L (ref 55–135)
ALT SERPL W/O P-5'-P-CCNC: 15 U/L (ref 10–44)
ANION GAP SERPL CALC-SCNC: 9 MMOL/L (ref 8–16)
AST SERPL-CCNC: 35 U/L (ref 10–40)
BACTERIA BLD CULT: NORMAL
BACTERIA BLD CULT: NORMAL
BASOPHILS # BLD AUTO: 0.03 K/UL (ref 0–0.2)
BASOPHILS NFR BLD: 0.4 % (ref 0–1.9)
BILIRUB SERPL-MCNC: 0.6 MG/DL (ref 0.1–1)
BUN SERPL-MCNC: 79 MG/DL (ref 8–23)
CALCIUM SERPL-MCNC: 7.9 MG/DL (ref 8.7–10.5)
CHLORIDE SERPL-SCNC: 105 MMOL/L (ref 95–110)
CO2 SERPL-SCNC: 24 MMOL/L (ref 23–29)
CREAT SERPL-MCNC: 3 MG/DL (ref 0.5–1.4)
DIFFERENTIAL METHOD: ABNORMAL
EOSINOPHIL # BLD AUTO: 0.1 K/UL (ref 0–0.5)
EOSINOPHIL NFR BLD: 1.4 % (ref 0–8)
ERYTHROCYTE [DISTWIDTH] IN BLOOD BY AUTOMATED COUNT: 24.5 % (ref 11.5–14.5)
EST. GFR  (AFRICAN AMERICAN): 24 ML/MIN/1.73 M^2
EST. GFR  (NON AFRICAN AMERICAN): 21 ML/MIN/1.73 M^2
GLUCOSE SERPL-MCNC: 126 MG/DL (ref 70–110)
HCT VFR BLD AUTO: 24.1 % (ref 40–54)
HGB BLD-MCNC: 7.1 G/DL (ref 14–18)
IMM GRANULOCYTES # BLD AUTO: 0.06 K/UL (ref 0–0.04)
IMM GRANULOCYTES NFR BLD AUTO: 0.7 % (ref 0–0.5)
LYMPHOCYTES # BLD AUTO: 1.3 K/UL (ref 1–4.8)
LYMPHOCYTES NFR BLD: 15.5 % (ref 18–48)
MAGNESIUM SERPL-MCNC: 1.6 MG/DL (ref 1.6–2.6)
MCH RBC QN AUTO: 21.7 PG (ref 27–31)
MCHC RBC AUTO-ENTMCNC: 29.5 G/DL (ref 32–36)
MCV RBC AUTO: 74 FL (ref 82–98)
MONOCYTES # BLD AUTO: 1 K/UL (ref 0.3–1)
MONOCYTES NFR BLD: 12.7 % (ref 4–15)
NEUTROPHILS # BLD AUTO: 5.6 K/UL (ref 1.8–7.7)
NEUTROPHILS NFR BLD: 69.3 % (ref 38–73)
NRBC BLD-RTO: 0 /100 WBC
PLATELET # BLD AUTO: 146 K/UL (ref 150–450)
PMV BLD AUTO: ABNORMAL FL (ref 9.2–12.9)
POCT GLUCOSE: 140 MG/DL (ref 70–110)
POCT GLUCOSE: 157 MG/DL (ref 70–110)
POCT GLUCOSE: 181 MG/DL (ref 70–110)
POCT GLUCOSE: 234 MG/DL (ref 70–110)
POTASSIUM SERPL-SCNC: 4.3 MMOL/L (ref 3.5–5.1)
PROT SERPL-MCNC: 6.2 G/DL (ref 6–8.4)
RBC # BLD AUTO: 3.27 M/UL (ref 4.6–6.2)
SODIUM SERPL-SCNC: 138 MMOL/L (ref 136–145)
VANCOMYCIN SERPL-MCNC: 18.8 UG/ML
VANCOMYCIN SERPL-MCNC: 22.2 UG/ML
WBC # BLD AUTO: 8.12 K/UL (ref 3.9–12.7)

## 2021-04-10 PROCEDURE — 63600175 PHARM REV CODE 636 W HCPCS: Performed by: EMERGENCY MEDICINE

## 2021-04-10 PROCEDURE — 21400001 HC TELEMETRY ROOM

## 2021-04-10 PROCEDURE — 36415 COLL VENOUS BLD VENIPUNCTURE: CPT | Performed by: NURSE PRACTITIONER

## 2021-04-10 PROCEDURE — 99232 PR SUBSEQUENT HOSPITAL CARE,LEVL II: ICD-10-PCS | Mod: ,,, | Performed by: INTERNAL MEDICINE

## 2021-04-10 PROCEDURE — 80053 COMPREHEN METABOLIC PANEL: CPT | Performed by: NURSE PRACTITIONER

## 2021-04-10 PROCEDURE — 36415 COLL VENOUS BLD VENIPUNCTURE: CPT | Performed by: EMERGENCY MEDICINE

## 2021-04-10 PROCEDURE — 25000003 PHARM REV CODE 250: Performed by: PHYSICIAN ASSISTANT

## 2021-04-10 PROCEDURE — S0030 INJECTION, METRONIDAZOLE: HCPCS | Performed by: INTERNAL MEDICINE

## 2021-04-10 PROCEDURE — 85025 COMPLETE CBC W/AUTO DIFF WBC: CPT | Performed by: NURSE PRACTITIONER

## 2021-04-10 PROCEDURE — 25000003 PHARM REV CODE 250: Performed by: NURSE PRACTITIONER

## 2021-04-10 PROCEDURE — 25000003 PHARM REV CODE 250: Performed by: EMERGENCY MEDICINE

## 2021-04-10 PROCEDURE — 99232 SBSQ HOSP IP/OBS MODERATE 35: CPT | Mod: ,,, | Performed by: INTERNAL MEDICINE

## 2021-04-10 PROCEDURE — 96372 THER/PROPH/DIAG INJ SC/IM: CPT

## 2021-04-10 PROCEDURE — 63600175 PHARM REV CODE 636 W HCPCS: Performed by: INTERNAL MEDICINE

## 2021-04-10 PROCEDURE — 83735 ASSAY OF MAGNESIUM: CPT | Performed by: NURSE PRACTITIONER

## 2021-04-10 PROCEDURE — 25000003 PHARM REV CODE 250: Performed by: INTERNAL MEDICINE

## 2021-04-10 PROCEDURE — 80202 ASSAY OF VANCOMYCIN: CPT | Performed by: EMERGENCY MEDICINE

## 2021-04-10 RX ORDER — CYANOCOBALAMIN 1000 UG/ML
1000 INJECTION, SOLUTION INTRAMUSCULAR; SUBCUTANEOUS ONCE
Status: COMPLETED | OUTPATIENT
Start: 2021-04-10 | End: 2021-04-10

## 2021-04-10 RX ADMIN — ASPIRIN 81 MG: 81 TABLET, COATED ORAL at 06:04

## 2021-04-10 RX ADMIN — METRONIDAZOLE 500 MG: 500 INJECTION, SOLUTION INTRAVENOUS at 11:04

## 2021-04-10 RX ADMIN — METRONIDAZOLE 500 MG: 500 INJECTION, SOLUTION INTRAVENOUS at 07:04

## 2021-04-10 RX ADMIN — CYANOCOBALAMIN 1000 MCG: 1000 INJECTION, SOLUTION INTRAMUSCULAR; SUBCUTANEOUS at 05:04

## 2021-04-10 RX ADMIN — FUROSEMIDE 60 MG: 10 INJECTION, SOLUTION INTRAMUSCULAR; INTRAVENOUS at 02:04

## 2021-04-10 RX ADMIN — COLLAGENASE SANTYL: 250 OINTMENT TOPICAL at 08:04

## 2021-04-10 RX ADMIN — INSULIN ASPART 1 UNITS: 100 INJECTION, SOLUTION INTRAVENOUS; SUBCUTANEOUS at 10:04

## 2021-04-10 RX ADMIN — SODIUM BICARBONATE 650 MG: 650 TABLET ORAL at 08:04

## 2021-04-10 RX ADMIN — INSULIN ASPART 2 UNITS: 100 INJECTION, SOLUTION INTRAVENOUS; SUBCUTANEOUS at 04:04

## 2021-04-10 RX ADMIN — SODIUM BICARBONATE 650 MG: 650 TABLET ORAL at 02:04

## 2021-04-10 RX ADMIN — SODIUM BICARBONATE 650 MG: 650 TABLET ORAL at 09:04

## 2021-04-10 RX ADMIN — INSULIN ASPART 4 UNITS: 100 INJECTION, SOLUTION INTRAVENOUS; SUBCUTANEOUS at 10:04

## 2021-04-10 RX ADMIN — VANCOMYCIN HYDROCHLORIDE 750 MG: 750 INJECTION, POWDER, LYOPHILIZED, FOR SOLUTION INTRAVENOUS at 04:04

## 2021-04-10 RX ADMIN — METOPROLOL TARTRATE 25 MG: 25 TABLET, FILM COATED ORAL at 08:04

## 2021-04-10 RX ADMIN — BRIMONIDINE TARTRATE 1 DROP: 2 SOLUTION OPHTHALMIC at 09:04

## 2021-04-10 RX ADMIN — TIMOLOL MALEATE 1 DROP: 5 SOLUTION OPHTHALMIC at 08:04

## 2021-04-10 RX ADMIN — TIMOLOL MALEATE 1 DROP: 5 SOLUTION OPHTHALMIC at 09:04

## 2021-04-10 RX ADMIN — METOPROLOL TARTRATE 25 MG: 25 TABLET, FILM COATED ORAL at 09:04

## 2021-04-10 RX ADMIN — IRON SUCROSE 200 MG: 20 INJECTION, SOLUTION INTRAVENOUS at 09:04

## 2021-04-10 RX ADMIN — FUROSEMIDE 60 MG: 10 INJECTION, SOLUTION INTRAMUSCULAR; INTRAVENOUS at 01:04

## 2021-04-10 RX ADMIN — MUPIROCIN: 20 OINTMENT TOPICAL at 08:04

## 2021-04-10 RX ADMIN — SIMVASTATIN 10 MG: 5 TABLET, FILM COATED ORAL at 09:04

## 2021-04-10 RX ADMIN — METRONIDAZOLE 500 MG: 500 INJECTION, SOLUTION INTRAVENOUS at 03:04

## 2021-04-10 RX ADMIN — BIMATOPROST 1 DROP: 0.1 SOLUTION/ DROPS OPHTHALMIC at 09:04

## 2021-04-10 RX ADMIN — BRIMONIDINE TARTRATE 1 DROP: 2 SOLUTION OPHTHALMIC at 08:04

## 2021-04-10 RX ADMIN — DIPHENHYDRAMINE HYDROCHLORIDE 25 MG: 25 CAPSULE ORAL at 09:04

## 2021-04-10 RX ADMIN — CEFTRIAXONE 2 G: 2 INJECTION, SOLUTION INTRAVENOUS at 06:04

## 2021-04-10 RX ADMIN — PANTOPRAZOLE SODIUM 40 MG: 40 TABLET, DELAYED RELEASE ORAL at 08:04

## 2021-04-11 PROBLEM — D72.829 LEUKOCYTOSIS: Status: RESOLVED | Noted: 2021-04-05 | Resolved: 2021-04-11

## 2021-04-11 PROBLEM — I50.9 CHF (CONGESTIVE HEART FAILURE): Status: RESOLVED | Noted: 2021-04-05 | Resolved: 2021-04-11

## 2021-04-11 LAB
ALBUMIN SERPL BCP-MCNC: 1.9 G/DL (ref 3.5–5.2)
ALP SERPL-CCNC: 76 U/L (ref 55–135)
ALT SERPL W/O P-5'-P-CCNC: 16 U/L (ref 10–44)
ANION GAP SERPL CALC-SCNC: 9 MMOL/L (ref 8–16)
ANISOCYTOSIS BLD QL SMEAR: SLIGHT
AST SERPL-CCNC: 32 U/L (ref 10–40)
BASOPHILS # BLD AUTO: 0.04 K/UL (ref 0–0.2)
BASOPHILS NFR BLD: 0.5 % (ref 0–1.9)
BILIRUB SERPL-MCNC: 0.5 MG/DL (ref 0.1–1)
BUN SERPL-MCNC: 74 MG/DL (ref 8–23)
CALCIUM SERPL-MCNC: 8.2 MG/DL (ref 8.7–10.5)
CHLORIDE SERPL-SCNC: 106 MMOL/L (ref 95–110)
CO2 SERPL-SCNC: 24 MMOL/L (ref 23–29)
CREAT SERPL-MCNC: 2.8 MG/DL (ref 0.5–1.4)
DIFFERENTIAL METHOD: ABNORMAL
EOSINOPHIL # BLD AUTO: 0.1 K/UL (ref 0–0.5)
EOSINOPHIL NFR BLD: 1.4 % (ref 0–8)
ERYTHROCYTE [DISTWIDTH] IN BLOOD BY AUTOMATED COUNT: 25.1 % (ref 11.5–14.5)
EST. GFR  (AFRICAN AMERICAN): 26 ML/MIN/1.73 M^2
EST. GFR  (NON AFRICAN AMERICAN): 22 ML/MIN/1.73 M^2
GLUCOSE SERPL-MCNC: 157 MG/DL (ref 70–110)
HCT VFR BLD AUTO: 25.3 % (ref 40–54)
HGB BLD-MCNC: 7.2 G/DL (ref 14–18)
HYPOCHROMIA BLD QL SMEAR: ABNORMAL
IMM GRANULOCYTES # BLD AUTO: 0.06 K/UL (ref 0–0.04)
IMM GRANULOCYTES NFR BLD AUTO: 0.7 % (ref 0–0.5)
IRON SERPL-MCNC: 259 UG/DL (ref 45–160)
LYMPHOCYTES # BLD AUTO: 1.2 K/UL (ref 1–4.8)
LYMPHOCYTES NFR BLD: 14 % (ref 18–48)
MAGNESIUM SERPL-MCNC: 1.6 MG/DL (ref 1.6–2.6)
MCH RBC QN AUTO: 21.4 PG (ref 27–31)
MCHC RBC AUTO-ENTMCNC: 28.5 G/DL (ref 32–36)
MCV RBC AUTO: 75 FL (ref 82–98)
MONOCYTES # BLD AUTO: 1.1 K/UL (ref 0.3–1)
MONOCYTES NFR BLD: 12.4 % (ref 4–15)
NEUTROPHILS # BLD AUTO: 6.3 K/UL (ref 1.8–7.7)
NEUTROPHILS NFR BLD: 71 % (ref 38–73)
NRBC BLD-RTO: 0 /100 WBC
OVALOCYTES BLD QL SMEAR: ABNORMAL
PLATELET # BLD AUTO: 192 K/UL (ref 150–450)
PLATELET BLD QL SMEAR: ABNORMAL
PMV BLD AUTO: ABNORMAL FL (ref 9.2–12.9)
POCT GLUCOSE: 142 MG/DL (ref 70–110)
POCT GLUCOSE: 193 MG/DL (ref 70–110)
POCT GLUCOSE: 204 MG/DL (ref 70–110)
POCT GLUCOSE: 260 MG/DL (ref 70–110)
POIKILOCYTOSIS BLD QL SMEAR: SLIGHT
POTASSIUM SERPL-SCNC: 4.1 MMOL/L (ref 3.5–5.1)
PROT SERPL-MCNC: 6.6 G/DL (ref 6–8.4)
RBC # BLD AUTO: 3.36 M/UL (ref 4.6–6.2)
SATURATED IRON: 96 % (ref 20–50)
SODIUM SERPL-SCNC: 139 MMOL/L (ref 136–145)
TARGETS BLD QL SMEAR: ABNORMAL
TOTAL IRON BINDING CAPACITY: 269 UG/DL (ref 250–450)
TRANSFERRIN SERPL-MCNC: 182 MG/DL (ref 200–375)
VANCOMYCIN SERPL-MCNC: 18.8 UG/ML
WBC # BLD AUTO: 8.81 K/UL (ref 3.9–12.7)

## 2021-04-11 PROCEDURE — 80202 ASSAY OF VANCOMYCIN: CPT | Performed by: EMERGENCY MEDICINE

## 2021-04-11 PROCEDURE — 36415 COLL VENOUS BLD VENIPUNCTURE: CPT | Performed by: EMERGENCY MEDICINE

## 2021-04-11 PROCEDURE — 63600175 PHARM REV CODE 636 W HCPCS: Performed by: INTERNAL MEDICINE

## 2021-04-11 PROCEDURE — S0030 INJECTION, METRONIDAZOLE: HCPCS | Performed by: INTERNAL MEDICINE

## 2021-04-11 PROCEDURE — 25000003 PHARM REV CODE 250: Performed by: EMERGENCY MEDICINE

## 2021-04-11 PROCEDURE — 99233 SBSQ HOSP IP/OBS HIGH 50: CPT | Mod: NSCH,,, | Performed by: INTERNAL MEDICINE

## 2021-04-11 PROCEDURE — 99232 PR SUBSEQUENT HOSPITAL CARE,LEVL II: ICD-10-PCS | Mod: ,,, | Performed by: INTERNAL MEDICINE

## 2021-04-11 PROCEDURE — 97110 THERAPEUTIC EXERCISES: CPT

## 2021-04-11 PROCEDURE — 21400001 HC TELEMETRY ROOM

## 2021-04-11 PROCEDURE — 25000003 PHARM REV CODE 250: Performed by: PHYSICIAN ASSISTANT

## 2021-04-11 PROCEDURE — 99231 PR SUBSEQUENT HOSPITAL CARE,LEVL I: ICD-10-PCS | Mod: ,,, | Performed by: INTERNAL MEDICINE

## 2021-04-11 PROCEDURE — 80053 COMPREHEN METABOLIC PANEL: CPT | Performed by: NURSE PRACTITIONER

## 2021-04-11 PROCEDURE — 99231 SBSQ HOSP IP/OBS SF/LOW 25: CPT | Mod: ,,, | Performed by: INTERNAL MEDICINE

## 2021-04-11 PROCEDURE — 63600175 PHARM REV CODE 636 W HCPCS: Performed by: EMERGENCY MEDICINE

## 2021-04-11 PROCEDURE — 97166 OT EVAL MOD COMPLEX 45 MIN: CPT

## 2021-04-11 PROCEDURE — 99233 PR SUBSEQUENT HOSPITAL CARE,LEVL III: ICD-10-PCS | Mod: NSCH,,, | Performed by: INTERNAL MEDICINE

## 2021-04-11 PROCEDURE — 97530 THERAPEUTIC ACTIVITIES: CPT

## 2021-04-11 PROCEDURE — 25000003 PHARM REV CODE 250: Performed by: NURSE PRACTITIONER

## 2021-04-11 PROCEDURE — 97162 PT EVAL MOD COMPLEX 30 MIN: CPT

## 2021-04-11 PROCEDURE — 99232 SBSQ HOSP IP/OBS MODERATE 35: CPT | Mod: ,,, | Performed by: INTERNAL MEDICINE

## 2021-04-11 PROCEDURE — 25000003 PHARM REV CODE 250: Performed by: INTERNAL MEDICINE

## 2021-04-11 PROCEDURE — 85025 COMPLETE CBC W/AUTO DIFF WBC: CPT | Performed by: NURSE PRACTITIONER

## 2021-04-11 PROCEDURE — 83540 ASSAY OF IRON: CPT | Performed by: INTERNAL MEDICINE

## 2021-04-11 PROCEDURE — 36415 COLL VENOUS BLD VENIPUNCTURE: CPT | Performed by: INTERNAL MEDICINE

## 2021-04-11 PROCEDURE — 83735 ASSAY OF MAGNESIUM: CPT | Performed by: NURSE PRACTITIONER

## 2021-04-11 RX ADMIN — SODIUM BICARBONATE 650 MG: 650 TABLET ORAL at 02:04

## 2021-04-11 RX ADMIN — PANTOPRAZOLE SODIUM 40 MG: 40 TABLET, DELAYED RELEASE ORAL at 09:04

## 2021-04-11 RX ADMIN — VANCOMYCIN HYDROCHLORIDE 750 MG: 750 INJECTION, POWDER, LYOPHILIZED, FOR SOLUTION INTRAVENOUS at 08:04

## 2021-04-11 RX ADMIN — BRIMONIDINE TARTRATE 1 DROP: 2 SOLUTION OPHTHALMIC at 08:04

## 2021-04-11 RX ADMIN — SIMVASTATIN 10 MG: 5 TABLET, FILM COATED ORAL at 08:04

## 2021-04-11 RX ADMIN — COLLAGENASE SANTYL: 250 OINTMENT TOPICAL at 09:04

## 2021-04-11 RX ADMIN — METRONIDAZOLE 500 MG: 500 INJECTION, SOLUTION INTRAVENOUS at 02:04

## 2021-04-11 RX ADMIN — METOPROLOL TARTRATE 25 MG: 25 TABLET, FILM COATED ORAL at 08:04

## 2021-04-11 RX ADMIN — METOPROLOL TARTRATE 25 MG: 25 TABLET, FILM COATED ORAL at 09:04

## 2021-04-11 RX ADMIN — TIMOLOL MALEATE 1 DROP: 5 SOLUTION OPHTHALMIC at 08:04

## 2021-04-11 RX ADMIN — SODIUM BICARBONATE 650 MG: 650 TABLET ORAL at 09:04

## 2021-04-11 RX ADMIN — FUROSEMIDE 60 MG: 10 INJECTION, SOLUTION INTRAMUSCULAR; INTRAVENOUS at 01:04

## 2021-04-11 RX ADMIN — INSULIN ASPART 4 UNITS: 100 INJECTION, SOLUTION INTRAVENOUS; SUBCUTANEOUS at 05:04

## 2021-04-11 RX ADMIN — METRONIDAZOLE 500 MG: 500 INJECTION, SOLUTION INTRAVENOUS at 11:04

## 2021-04-11 RX ADMIN — FUROSEMIDE 60 MG: 10 INJECTION, SOLUTION INTRAMUSCULAR; INTRAVENOUS at 02:04

## 2021-04-11 RX ADMIN — BIMATOPROST 1 DROP: 0.1 SOLUTION/ DROPS OPHTHALMIC at 08:04

## 2021-04-11 RX ADMIN — METRONIDAZOLE 500 MG: 500 INJECTION, SOLUTION INTRAVENOUS at 08:04

## 2021-04-11 RX ADMIN — TIMOLOL MALEATE 1 DROP: 5 SOLUTION OPHTHALMIC at 09:04

## 2021-04-11 RX ADMIN — SODIUM BICARBONATE 650 MG: 650 TABLET ORAL at 08:04

## 2021-04-11 RX ADMIN — CEFTRIAXONE 2 G: 2 INJECTION, SOLUTION INTRAVENOUS at 06:04

## 2021-04-11 RX ADMIN — INSULIN ASPART 6 UNITS: 100 INJECTION, SOLUTION INTRAVENOUS; SUBCUTANEOUS at 11:04

## 2021-04-11 RX ADMIN — INSULIN ASPART 2 UNITS: 100 INJECTION, SOLUTION INTRAVENOUS; SUBCUTANEOUS at 06:04

## 2021-04-11 RX ADMIN — ASPIRIN 81 MG: 81 TABLET, COATED ORAL at 06:04

## 2021-04-12 VITALS
WEIGHT: 214.94 LBS | OXYGEN SATURATION: 99 % | SYSTOLIC BLOOD PRESSURE: 116 MMHG | TEMPERATURE: 99 F | BODY MASS INDEX: 32.58 KG/M2 | DIASTOLIC BLOOD PRESSURE: 70 MMHG | RESPIRATION RATE: 20 BRPM | HEIGHT: 68 IN | HEART RATE: 75 BPM

## 2021-04-12 PROBLEM — I50.43 ACUTE ON CHRONIC COMBINED SYSTOLIC AND DIASTOLIC CONGESTIVE HEART FAILURE: Status: RESOLVED | Noted: 2019-01-15 | Resolved: 2021-04-12

## 2021-04-12 PROBLEM — N17.9 AKI (ACUTE KIDNEY INJURY): Status: RESOLVED | Noted: 2018-10-31 | Resolved: 2021-04-12

## 2021-04-12 LAB
ALBUMIN SERPL BCP-MCNC: 2 G/DL (ref 3.5–5.2)
ALP SERPL-CCNC: 78 U/L (ref 55–135)
ALT SERPL W/O P-5'-P-CCNC: 15 U/L (ref 10–44)
ANION GAP SERPL CALC-SCNC: 10 MMOL/L (ref 8–16)
AST SERPL-CCNC: 27 U/L (ref 10–40)
BASOPHILS # BLD AUTO: 0.06 K/UL (ref 0–0.2)
BASOPHILS NFR BLD: 0.6 % (ref 0–1.9)
BILIRUB SERPL-MCNC: 0.6 MG/DL (ref 0.1–1)
BUN SERPL-MCNC: 64 MG/DL (ref 8–23)
CALCIUM SERPL-MCNC: 8.3 MG/DL (ref 8.7–10.5)
CHLORIDE SERPL-SCNC: 107 MMOL/L (ref 95–110)
CO2 SERPL-SCNC: 24 MMOL/L (ref 23–29)
CREAT SERPL-MCNC: 2.6 MG/DL (ref 0.5–1.4)
CRP SERPL-MCNC: 40.2 MG/L (ref 0–8.2)
DIFFERENTIAL METHOD: ABNORMAL
EOSINOPHIL # BLD AUTO: 0.1 K/UL (ref 0–0.5)
EOSINOPHIL NFR BLD: 1.3 % (ref 0–8)
ERYTHROCYTE [DISTWIDTH] IN BLOOD BY AUTOMATED COUNT: 25.7 % (ref 11.5–14.5)
ERYTHROCYTE [SEDIMENTATION RATE] IN BLOOD BY WESTERGREN METHOD: 88 MM/HR (ref 0–10)
EST. GFR  (AFRICAN AMERICAN): 28 ML/MIN/1.73 M^2
EST. GFR  (NON AFRICAN AMERICAN): 24 ML/MIN/1.73 M^2
GLUCOSE SERPL-MCNC: 108 MG/DL (ref 70–110)
HCT VFR BLD AUTO: 25.5 % (ref 40–54)
HGB BLD-MCNC: 7.2 G/DL (ref 14–18)
IMM GRANULOCYTES # BLD AUTO: 0.06 K/UL (ref 0–0.04)
IMM GRANULOCYTES NFR BLD AUTO: 0.6 % (ref 0–0.5)
LYMPHOCYTES # BLD AUTO: 1.8 K/UL (ref 1–4.8)
LYMPHOCYTES NFR BLD: 17.4 % (ref 18–48)
MAGNESIUM SERPL-MCNC: 1.6 MG/DL (ref 1.6–2.6)
MCH RBC QN AUTO: 21.8 PG (ref 27–31)
MCHC RBC AUTO-ENTMCNC: 28.2 G/DL (ref 32–36)
MCV RBC AUTO: 77 FL (ref 82–98)
MONOCYTES # BLD AUTO: 1.1 K/UL (ref 0.3–1)
MONOCYTES NFR BLD: 10.5 % (ref 4–15)
NEUTROPHILS # BLD AUTO: 7.2 K/UL (ref 1.8–7.7)
NEUTROPHILS NFR BLD: 69.6 % (ref 38–73)
NRBC BLD-RTO: 0 /100 WBC
PLATELET # BLD AUTO: 194 K/UL (ref 150–450)
PMV BLD AUTO: ABNORMAL FL (ref 9.2–12.9)
POCT GLUCOSE: 123 MG/DL (ref 70–110)
POCT GLUCOSE: 158 MG/DL (ref 70–110)
POCT GLUCOSE: 160 MG/DL (ref 70–110)
POTASSIUM SERPL-SCNC: 4.2 MMOL/L (ref 3.5–5.1)
PROT SERPL-MCNC: 6.7 G/DL (ref 6–8.4)
RBC # BLD AUTO: 3.3 M/UL (ref 4.6–6.2)
SODIUM SERPL-SCNC: 141 MMOL/L (ref 136–145)
WBC # BLD AUTO: 10.32 K/UL (ref 3.9–12.7)

## 2021-04-12 PROCEDURE — 25000003 PHARM REV CODE 250: Performed by: EMERGENCY MEDICINE

## 2021-04-12 PROCEDURE — 85651 RBC SED RATE NONAUTOMATED: CPT | Performed by: EMERGENCY MEDICINE

## 2021-04-12 PROCEDURE — 99233 SBSQ HOSP IP/OBS HIGH 50: CPT | Mod: ,,, | Performed by: INTERNAL MEDICINE

## 2021-04-12 PROCEDURE — S0030 INJECTION, METRONIDAZOLE: HCPCS | Performed by: INTERNAL MEDICINE

## 2021-04-12 PROCEDURE — 99233 PR SUBSEQUENT HOSPITAL CARE,LEVL III: ICD-10-PCS | Mod: ,,, | Performed by: INTERNAL MEDICINE

## 2021-04-12 PROCEDURE — 80053 COMPREHEN METABOLIC PANEL: CPT | Performed by: NURSE PRACTITIONER

## 2021-04-12 PROCEDURE — 25000003 PHARM REV CODE 250: Performed by: PHYSICIAN ASSISTANT

## 2021-04-12 PROCEDURE — 25000003 PHARM REV CODE 250: Performed by: NURSE PRACTITIONER

## 2021-04-12 PROCEDURE — 85025 COMPLETE CBC W/AUTO DIFF WBC: CPT | Performed by: NURSE PRACTITIONER

## 2021-04-12 PROCEDURE — 63600175 PHARM REV CODE 636 W HCPCS: Performed by: INTERNAL MEDICINE

## 2021-04-12 PROCEDURE — 25000003 PHARM REV CODE 250: Performed by: INTERNAL MEDICINE

## 2021-04-12 PROCEDURE — 36415 COLL VENOUS BLD VENIPUNCTURE: CPT | Performed by: NURSE PRACTITIONER

## 2021-04-12 PROCEDURE — 83735 ASSAY OF MAGNESIUM: CPT | Performed by: NURSE PRACTITIONER

## 2021-04-12 PROCEDURE — 86140 C-REACTIVE PROTEIN: CPT | Performed by: EMERGENCY MEDICINE

## 2021-04-12 RX ORDER — INSULIN ASPART 100 [IU]/ML
1-10 INJECTION, SOLUTION INTRAVENOUS; SUBCUTANEOUS EVERY 6 HOURS PRN
Refills: 0
Start: 2021-04-12 | End: 2022-04-12

## 2021-04-12 RX ORDER — METOPROLOL TARTRATE 25 MG/1
25 TABLET, FILM COATED ORAL 2 TIMES DAILY
Qty: 60 TABLET | Refills: 11 | Status: SHIPPED | OUTPATIENT
Start: 2021-04-12 | End: 2022-04-12

## 2021-04-12 RX ORDER — GUAIFENESIN 600 MG/1
600 TABLET, EXTENDED RELEASE ORAL 2 TIMES DAILY
Status: DISCONTINUED | OUTPATIENT
Start: 2021-04-12 | End: 2021-04-12 | Stop reason: HOSPADM

## 2021-04-12 RX ADMIN — SODIUM BICARBONATE 650 MG: 650 TABLET ORAL at 08:04

## 2021-04-12 RX ADMIN — FUROSEMIDE 60 MG: 10 INJECTION, SOLUTION INTRAMUSCULAR; INTRAVENOUS at 03:04

## 2021-04-12 RX ADMIN — FUROSEMIDE 60 MG: 10 INJECTION, SOLUTION INTRAMUSCULAR; INTRAVENOUS at 01:04

## 2021-04-12 RX ADMIN — CEFTRIAXONE 2 G: 2 INJECTION, SOLUTION INTRAVENOUS at 06:04

## 2021-04-12 RX ADMIN — INSULIN ASPART 2 UNITS: 100 INJECTION, SOLUTION INTRAVENOUS; SUBCUTANEOUS at 03:04

## 2021-04-12 RX ADMIN — INSULIN ASPART 2 UNITS: 100 INJECTION, SOLUTION INTRAVENOUS; SUBCUTANEOUS at 11:04

## 2021-04-12 RX ADMIN — SODIUM BICARBONATE 650 MG: 650 TABLET ORAL at 03:04

## 2021-04-12 RX ADMIN — GUAIFENESIN 600 MG: 600 TABLET, EXTENDED RELEASE ORAL at 10:04

## 2021-04-12 RX ADMIN — BRIMONIDINE TARTRATE 1 DROP: 2 SOLUTION OPHTHALMIC at 08:04

## 2021-04-12 RX ADMIN — METRONIDAZOLE 500 MG: 500 INJECTION, SOLUTION INTRAVENOUS at 08:04

## 2021-04-12 RX ADMIN — METOPROLOL TARTRATE 25 MG: 25 TABLET, FILM COATED ORAL at 08:04

## 2021-04-12 RX ADMIN — PANTOPRAZOLE SODIUM 40 MG: 40 TABLET, DELAYED RELEASE ORAL at 08:04

## 2021-04-12 RX ADMIN — TIMOLOL MALEATE 1 DROP: 5 SOLUTION OPHTHALMIC at 08:04

## 2021-04-12 RX ADMIN — COLLAGENASE SANTYL: 250 OINTMENT TOPICAL at 01:04

## 2021-04-12 RX ADMIN — ASPIRIN 81 MG: 81 TABLET, COATED ORAL at 06:04

## 2021-04-13 ENCOUNTER — PATIENT OUTREACH (OUTPATIENT)
Dept: ADMINISTRATIVE | Facility: CLINIC | Age: 66
End: 2021-04-13

## 2021-04-13 ENCOUNTER — TELEPHONE (OUTPATIENT)
Dept: OPHTHALMOLOGY | Facility: CLINIC | Age: 66
End: 2021-04-13

## 2021-04-14 ENCOUNTER — TELEPHONE (OUTPATIENT)
Dept: TRANSPLANT | Facility: CLINIC | Age: 66
End: 2021-04-14

## 2021-04-28 ENCOUNTER — HOSPITAL ENCOUNTER (INPATIENT)
Facility: HOSPITAL | Age: 66
LOS: 3 days | Discharge: HOSPICE | DRG: 622 | End: 2021-05-03
Attending: EMERGENCY MEDICINE | Admitting: INTERNAL MEDICINE
Payer: MEDICARE

## 2021-04-28 DIAGNOSIS — M86.8X7 OTHER OSTEOMYELITIS OF RIGHT FOOT: ICD-10-CM

## 2021-04-28 DIAGNOSIS — I25.10 CAD, MULTIPLE VESSEL: ICD-10-CM

## 2021-04-28 DIAGNOSIS — E11.22 STAGE 4 CHRONIC KIDNEY DISEASE DUE TO TYPE 2 DIABETES MELLITUS: ICD-10-CM

## 2021-04-28 DIAGNOSIS — I13.10 CARDIORENAL SYNDROME WITH RENAL FAILURE: Primary | ICD-10-CM

## 2021-04-28 DIAGNOSIS — I13.10 CARDIORENAL SYNDROME WITH RENAL FAILURE: ICD-10-CM

## 2021-04-28 DIAGNOSIS — E11.00 TYPE 2 DIABETES MELLITUS WITH HYPEROSMOLARITY WITHOUT COMA, WITH LONG-TERM CURRENT USE OF INSULIN: ICD-10-CM

## 2021-04-28 DIAGNOSIS — E87.8 ELECTROLYTE ABNORMALITY: ICD-10-CM

## 2021-04-28 DIAGNOSIS — N18.4 STAGE 4 CHRONIC KIDNEY DISEASE DUE TO TYPE 2 DIABETES MELLITUS: ICD-10-CM

## 2021-04-28 DIAGNOSIS — Z79.4 TYPE 2 DIABETES MELLITUS WITH HYPEROSMOLARITY WITHOUT COMA, WITH LONG-TERM CURRENT USE OF INSULIN: ICD-10-CM

## 2021-04-28 DIAGNOSIS — T14.8XXA NONHEALING NONSURGICAL WOUND: ICD-10-CM

## 2021-04-28 DIAGNOSIS — N17.9 ACUTE RENAL FAILURE: ICD-10-CM

## 2021-04-28 DIAGNOSIS — R06.00 DYSPNEA: ICD-10-CM

## 2021-04-28 DIAGNOSIS — N17.9 AKI (ACUTE KIDNEY INJURY): ICD-10-CM

## 2021-04-28 DIAGNOSIS — L97.914: ICD-10-CM

## 2021-04-28 PROBLEM — L97.919 NONHEALING ULCER OF RIGHT LOWER LEG: Status: ACTIVE | Noted: 2021-04-28

## 2021-04-28 LAB
ALBUMIN SERPL BCP-MCNC: 2.5 G/DL (ref 3.5–5.2)
ALP SERPL-CCNC: 84 U/L (ref 55–135)
ALT SERPL W/O P-5'-P-CCNC: 11 U/L (ref 10–44)
ANION GAP SERPL CALC-SCNC: 10 MMOL/L (ref 8–16)
AST SERPL-CCNC: 41 U/L (ref 10–40)
BACTERIA #/AREA URNS HPF: ABNORMAL /HPF
BASOPHILS # BLD AUTO: 0.06 K/UL (ref 0–0.2)
BASOPHILS NFR BLD: 0.9 % (ref 0–1.9)
BILIRUB SERPL-MCNC: 0.5 MG/DL (ref 0.1–1)
BILIRUB UR QL STRIP: NEGATIVE
BNP SERPL-MCNC: 4332 PG/ML (ref 0–99)
BUN SERPL-MCNC: 44 MG/DL (ref 8–23)
CALCIUM SERPL-MCNC: 8.2 MG/DL (ref 8.7–10.5)
CHLORIDE SERPL-SCNC: 112 MMOL/L (ref 95–110)
CK SERPL-CCNC: 279 U/L (ref 20–200)
CLARITY UR: CLEAR
CO2 SERPL-SCNC: 19 MMOL/L (ref 23–29)
COLOR UR: YELLOW
CREAT SERPL-MCNC: 4.3 MG/DL (ref 0.5–1.4)
CREAT UR-MCNC: 122.1 MG/DL (ref 23–375)
CTP QC/QA: YES
DIFFERENTIAL METHOD: ABNORMAL
EOSINOPHIL # BLD AUTO: 0.2 K/UL (ref 0–0.5)
EOSINOPHIL NFR BLD: 2.7 % (ref 0–8)
ERYTHROCYTE [DISTWIDTH] IN BLOOD BY AUTOMATED COUNT: 29.2 % (ref 11.5–14.5)
EST. GFR  (AFRICAN AMERICAN): 15 ML/MIN/1.73 M^2
EST. GFR  (NON AFRICAN AMERICAN): 13 ML/MIN/1.73 M^2
GLUCOSE SERPL-MCNC: 105 MG/DL (ref 70–110)
GLUCOSE UR QL STRIP: NEGATIVE
HCT VFR BLD AUTO: 33.3 % (ref 40–54)
HGB BLD-MCNC: 9.3 G/DL (ref 14–18)
HGB UR QL STRIP: ABNORMAL
HYALINE CASTS #/AREA URNS LPF: 2 /LPF
IMM GRANULOCYTES # BLD AUTO: 0.01 K/UL (ref 0–0.04)
IMM GRANULOCYTES NFR BLD AUTO: 0.2 % (ref 0–0.5)
KETONES UR QL STRIP: NEGATIVE
LEUKOCYTE ESTERASE UR QL STRIP: ABNORMAL
LYMPHOCYTES # BLD AUTO: 1.3 K/UL (ref 1–4.8)
LYMPHOCYTES NFR BLD: 19.7 % (ref 18–48)
MCH RBC QN AUTO: 24 PG (ref 27–31)
MCHC RBC AUTO-ENTMCNC: 27.9 G/DL (ref 32–36)
MCV RBC AUTO: 86 FL (ref 82–98)
MICROSCOPIC COMMENT: ABNORMAL
MONOCYTES # BLD AUTO: 0.7 K/UL (ref 0.3–1)
MONOCYTES NFR BLD: 10.7 % (ref 4–15)
NEUTROPHILS # BLD AUTO: 4.3 K/UL (ref 1.8–7.7)
NEUTROPHILS NFR BLD: 65.8 % (ref 38–73)
NITRITE UR QL STRIP: NEGATIVE
NRBC BLD-RTO: 0 /100 WBC
PH UR STRIP: 6 [PH] (ref 5–8)
PLATELET # BLD AUTO: 175 K/UL (ref 150–450)
PMV BLD AUTO: 11.2 FL (ref 9.2–12.9)
POCT GLUCOSE: 117 MG/DL (ref 70–110)
POCT GLUCOSE: 166 MG/DL (ref 70–110)
POTASSIUM SERPL-SCNC: 4.4 MMOL/L (ref 3.5–5.1)
PROT SERPL-MCNC: 7.1 G/DL (ref 6–8.4)
PROT UR QL STRIP: NEGATIVE
PROT UR-MCNC: 22 MG/DL (ref 0–15)
PROT/CREAT UR: 0.18 MG/G{CREAT} (ref 0–0.2)
RBC # BLD AUTO: 3.88 M/UL (ref 4.6–6.2)
RBC #/AREA URNS HPF: 1 /HPF (ref 0–4)
SARS-COV-2 RDRP RESP QL NAA+PROBE: NEGATIVE
SODIUM SERPL-SCNC: 141 MMOL/L (ref 136–145)
SODIUM UR-SCNC: 46 MMOL/L (ref 20–250)
SP GR UR STRIP: 1.02 (ref 1–1.03)
TROPONIN I SERPL DL<=0.01 NG/ML-MCNC: 0.05 NG/ML (ref 0–0.03)
URN SPEC COLLECT METH UR: ABNORMAL
UROBILINOGEN UR STRIP-ACNC: NEGATIVE EU/DL
VANCOMYCIN SERPL-MCNC: 29.7 UG/ML
WBC # BLD AUTO: 6.55 K/UL (ref 3.9–12.7)
WBC #/AREA URNS HPF: 5 /HPF (ref 0–5)
YEAST URNS QL MICRO: ABNORMAL

## 2021-04-28 PROCEDURE — G0378 HOSPITAL OBSERVATION PER HR: HCPCS

## 2021-04-28 PROCEDURE — 93010 ELECTROCARDIOGRAM REPORT: CPT | Mod: ,,, | Performed by: INTERNAL MEDICINE

## 2021-04-28 PROCEDURE — 93005 ELECTROCARDIOGRAM TRACING: CPT

## 2021-04-28 PROCEDURE — 93010 EKG 12-LEAD: ICD-10-PCS | Mod: ,,, | Performed by: INTERNAL MEDICINE

## 2021-04-28 PROCEDURE — U0002 COVID-19 LAB TEST NON-CDC: HCPCS | Performed by: FAMILY MEDICINE

## 2021-04-28 PROCEDURE — 96375 TX/PRO/DX INJ NEW DRUG ADDON: CPT

## 2021-04-28 PROCEDURE — 25000003 PHARM REV CODE 250: Performed by: FAMILY MEDICINE

## 2021-04-28 PROCEDURE — 81000 URINALYSIS NONAUTO W/SCOPE: CPT | Performed by: NURSE PRACTITIONER

## 2021-04-28 PROCEDURE — 82962 GLUCOSE BLOOD TEST: CPT

## 2021-04-28 PROCEDURE — 25000003 PHARM REV CODE 250: Performed by: NURSE PRACTITIONER

## 2021-04-28 PROCEDURE — 82550 ASSAY OF CK (CPK): CPT | Performed by: EMERGENCY MEDICINE

## 2021-04-28 PROCEDURE — 87086 URINE CULTURE/COLONY COUNT: CPT | Performed by: INTERNAL MEDICINE

## 2021-04-28 PROCEDURE — 82570 ASSAY OF URINE CREATININE: CPT | Performed by: NURSE PRACTITIONER

## 2021-04-28 PROCEDURE — 99291 CRITICAL CARE FIRST HOUR: CPT | Mod: 25

## 2021-04-28 PROCEDURE — 80053 COMPREHEN METABOLIC PANEL: CPT | Performed by: EMERGENCY MEDICINE

## 2021-04-28 PROCEDURE — 85025 COMPLETE CBC W/AUTO DIFF WBC: CPT | Performed by: EMERGENCY MEDICINE

## 2021-04-28 PROCEDURE — 84300 ASSAY OF URINE SODIUM: CPT | Performed by: NURSE PRACTITIONER

## 2021-04-28 PROCEDURE — 80202 ASSAY OF VANCOMYCIN: CPT | Performed by: EMERGENCY MEDICINE

## 2021-04-28 PROCEDURE — 36415 COLL VENOUS BLD VENIPUNCTURE: CPT | Performed by: EMERGENCY MEDICINE

## 2021-04-28 PROCEDURE — 63600175 PHARM REV CODE 636 W HCPCS: Performed by: NURSE PRACTITIONER

## 2021-04-28 PROCEDURE — 84484 ASSAY OF TROPONIN QUANT: CPT | Performed by: EMERGENCY MEDICINE

## 2021-04-28 PROCEDURE — 83880 ASSAY OF NATRIURETIC PEPTIDE: CPT | Performed by: EMERGENCY MEDICINE

## 2021-04-28 RX ORDER — BRIMONIDINE TARTRATE AND TIMOLOL MALEATE 2; 5 MG/ML; MG/ML
1 SOLUTION OPHTHALMIC EVERY 12 HOURS
Status: DISCONTINUED | OUTPATIENT
Start: 2021-04-28 | End: 2021-04-28

## 2021-04-28 RX ORDER — CHOLECALCIFEROL (VITAMIN D3) 25 MCG
1000 TABLET ORAL DAILY
Status: DISCONTINUED | OUTPATIENT
Start: 2021-04-29 | End: 2021-05-03 | Stop reason: HOSPADM

## 2021-04-28 RX ORDER — ASPIRIN 81 MG/1
81 TABLET ORAL EVERY MORNING
Status: DISCONTINUED | OUTPATIENT
Start: 2021-04-29 | End: 2021-05-03 | Stop reason: HOSPADM

## 2021-04-28 RX ORDER — ALBUTEROL SULFATE 90 UG/1
2 AEROSOL, METERED RESPIRATORY (INHALATION) EVERY 6 HOURS PRN
Status: DISCONTINUED | OUTPATIENT
Start: 2021-04-28 | End: 2021-04-28

## 2021-04-28 RX ORDER — ONDANSETRON 2 MG/ML
4 INJECTION INTRAMUSCULAR; INTRAVENOUS EVERY 6 HOURS PRN
Status: DISCONTINUED | OUTPATIENT
Start: 2021-04-28 | End: 2021-05-03 | Stop reason: HOSPADM

## 2021-04-28 RX ORDER — GLUCAGON 1 MG
1 KIT INJECTION
Status: DISCONTINUED | OUTPATIENT
Start: 2021-04-28 | End: 2021-05-03 | Stop reason: HOSPADM

## 2021-04-28 RX ORDER — HYDRALAZINE HYDROCHLORIDE 20 MG/ML
10 INJECTION INTRAMUSCULAR; INTRAVENOUS EVERY 6 HOURS PRN
Status: DISCONTINUED | OUTPATIENT
Start: 2021-04-28 | End: 2021-05-03 | Stop reason: HOSPADM

## 2021-04-28 RX ORDER — SIMVASTATIN 5 MG/1
10 TABLET, FILM COATED ORAL NIGHTLY
Status: DISCONTINUED | OUTPATIENT
Start: 2021-04-28 | End: 2021-05-03 | Stop reason: HOSPADM

## 2021-04-28 RX ORDER — ACETAMINOPHEN 325 MG/1
650 TABLET ORAL EVERY 6 HOURS PRN
Status: DISCONTINUED | OUTPATIENT
Start: 2021-04-28 | End: 2021-05-03 | Stop reason: HOSPADM

## 2021-04-28 RX ORDER — ALLOPURINOL 100 MG/1
100 TABLET ORAL DAILY
Status: DISCONTINUED | OUTPATIENT
Start: 2021-04-29 | End: 2021-05-03 | Stop reason: HOSPADM

## 2021-04-28 RX ORDER — SODIUM BICARBONATE 650 MG/1
650 TABLET ORAL 3 TIMES DAILY
Status: DISCONTINUED | OUTPATIENT
Start: 2021-04-28 | End: 2021-05-03 | Stop reason: HOSPADM

## 2021-04-28 RX ORDER — IBUPROFEN 200 MG
24 TABLET ORAL
Status: DISCONTINUED | OUTPATIENT
Start: 2021-04-28 | End: 2021-05-03 | Stop reason: HOSPADM

## 2021-04-28 RX ORDER — METOPROLOL TARTRATE 25 MG/1
25 TABLET, FILM COATED ORAL 2 TIMES DAILY
Status: DISCONTINUED | OUTPATIENT
Start: 2021-04-28 | End: 2021-05-03 | Stop reason: HOSPADM

## 2021-04-28 RX ORDER — FUROSEMIDE 10 MG/ML
40 INJECTION INTRAMUSCULAR; INTRAVENOUS EVERY 12 HOURS
Status: DISCONTINUED | OUTPATIENT
Start: 2021-04-28 | End: 2021-05-01

## 2021-04-28 RX ORDER — INSULIN ASPART 100 [IU]/ML
1-10 INJECTION, SOLUTION INTRAVENOUS; SUBCUTANEOUS
Status: DISCONTINUED | OUTPATIENT
Start: 2021-04-28 | End: 2021-05-03 | Stop reason: HOSPADM

## 2021-04-28 RX ORDER — BRIMONIDINE TARTRATE 2 MG/ML
1 SOLUTION/ DROPS OPHTHALMIC EVERY 12 HOURS
Status: DISCONTINUED | OUTPATIENT
Start: 2021-04-28 | End: 2021-05-03 | Stop reason: HOSPADM

## 2021-04-28 RX ORDER — TIMOLOL MALEATE 5 MG/ML
1 SOLUTION/ DROPS OPHTHALMIC 2 TIMES DAILY
Status: DISCONTINUED | OUTPATIENT
Start: 2021-04-28 | End: 2021-05-03 | Stop reason: HOSPADM

## 2021-04-28 RX ORDER — PANTOPRAZOLE SODIUM 40 MG/1
40 TABLET, DELAYED RELEASE ORAL DAILY
Status: DISCONTINUED | OUTPATIENT
Start: 2021-04-29 | End: 2021-05-03 | Stop reason: HOSPADM

## 2021-04-28 RX ORDER — ALBUTEROL SULFATE 0.83 MG/ML
2.5 SOLUTION RESPIRATORY (INHALATION) EVERY 6 HOURS PRN
Status: DISCONTINUED | OUTPATIENT
Start: 2021-04-28 | End: 2021-05-03 | Stop reason: HOSPADM

## 2021-04-28 RX ORDER — IBUPROFEN 200 MG
16 TABLET ORAL
Status: DISCONTINUED | OUTPATIENT
Start: 2021-04-28 | End: 2021-05-03 | Stop reason: HOSPADM

## 2021-04-28 RX ADMIN — FUROSEMIDE 40 MG: 10 INJECTION, SOLUTION INTRAMUSCULAR; INTRAVENOUS at 08:04

## 2021-04-28 RX ADMIN — SODIUM BICARBONATE 650 MG: 650 TABLET ORAL at 09:04

## 2021-04-28 RX ADMIN — METOPROLOL TARTRATE 25 MG: 25 TABLET, FILM COATED ORAL at 09:04

## 2021-04-28 RX ADMIN — BRIMONIDINE TARTRATE 1 DROP: 2 SOLUTION OPHTHALMIC at 09:04

## 2021-04-28 RX ADMIN — BIMATOPROST 1 DROP: 0.1 SOLUTION/ DROPS OPHTHALMIC at 09:04

## 2021-04-28 RX ADMIN — SIMVASTATIN 10 MG: 5 TABLET, FILM COATED ORAL at 10:04

## 2021-04-28 RX ADMIN — TIMOLOL MALEATE 1 DROP: 5 SOLUTION OPHTHALMIC at 09:04

## 2021-04-28 RX ADMIN — SACUBITRIL AND VALSARTAN 1 TABLET: 24; 26 TABLET, FILM COATED ORAL at 09:04

## 2021-04-29 LAB
ALBUMIN SERPL BCP-MCNC: 2.3 G/DL (ref 3.5–5.2)
ALP SERPL-CCNC: 80 U/L (ref 55–135)
ALT SERPL W/O P-5'-P-CCNC: 11 U/L (ref 10–44)
ANION GAP SERPL CALC-SCNC: 11 MMOL/L (ref 8–16)
AST SERPL-CCNC: 28 U/L (ref 10–40)
BASOPHILS # BLD AUTO: 0.04 K/UL (ref 0–0.2)
BASOPHILS NFR BLD: 0.6 % (ref 0–1.9)
BILIRUB SERPL-MCNC: 0.4 MG/DL (ref 0.1–1)
BUN SERPL-MCNC: 45 MG/DL (ref 8–23)
CALCIUM SERPL-MCNC: 8 MG/DL (ref 8.7–10.5)
CHLORIDE SERPL-SCNC: 114 MMOL/L (ref 95–110)
CO2 SERPL-SCNC: 17 MMOL/L (ref 23–29)
CREAT SERPL-MCNC: 4.1 MG/DL (ref 0.5–1.4)
DIFFERENTIAL METHOD: ABNORMAL
EOSINOPHIL # BLD AUTO: 0.2 K/UL (ref 0–0.5)
EOSINOPHIL NFR BLD: 2.4 % (ref 0–8)
ERYTHROCYTE [DISTWIDTH] IN BLOOD BY AUTOMATED COUNT: 29.6 % (ref 11.5–14.5)
EST. GFR  (AFRICAN AMERICAN): 16 ML/MIN/1.73 M^2
EST. GFR  (NON AFRICAN AMERICAN): 14 ML/MIN/1.73 M^2
GLUCOSE SERPL-MCNC: 86 MG/DL (ref 70–110)
HCT VFR BLD AUTO: 33.3 % (ref 40–54)
HGB BLD-MCNC: 9 G/DL (ref 14–18)
IMM GRANULOCYTES # BLD AUTO: 0.04 K/UL (ref 0–0.04)
IMM GRANULOCYTES NFR BLD AUTO: 0.6 % (ref 0–0.5)
LYMPHOCYTES # BLD AUTO: 1.4 K/UL (ref 1–4.8)
LYMPHOCYTES NFR BLD: 20.4 % (ref 18–48)
MAGNESIUM SERPL-MCNC: 1.7 MG/DL (ref 1.6–2.6)
MCH RBC QN AUTO: 23.9 PG (ref 27–31)
MCHC RBC AUTO-ENTMCNC: 27 G/DL (ref 32–36)
MCV RBC AUTO: 88 FL (ref 82–98)
MONOCYTES # BLD AUTO: 0.9 K/UL (ref 0.3–1)
MONOCYTES NFR BLD: 13 % (ref 4–15)
NEUTROPHILS # BLD AUTO: 4.2 K/UL (ref 1.8–7.7)
NEUTROPHILS NFR BLD: 63 % (ref 38–73)
NRBC BLD-RTO: 0 /100 WBC
PLATELET # BLD AUTO: 149 K/UL (ref 150–450)
PMV BLD AUTO: ABNORMAL FL (ref 9.2–12.9)
POCT GLUCOSE: 113 MG/DL (ref 70–110)
POCT GLUCOSE: 92 MG/DL (ref 70–110)
POTASSIUM SERPL-SCNC: 4.3 MMOL/L (ref 3.5–5.1)
PROT SERPL-MCNC: 6.6 G/DL (ref 6–8.4)
RBC # BLD AUTO: 3.77 M/UL (ref 4.6–6.2)
SODIUM SERPL-SCNC: 142 MMOL/L (ref 136–145)
WBC # BLD AUTO: 6.67 K/UL (ref 3.9–12.7)

## 2021-04-29 PROCEDURE — 96366 THER/PROPH/DIAG IV INF ADDON: CPT

## 2021-04-29 PROCEDURE — 99215 OFFICE O/P EST HI 40 MIN: CPT | Mod: ,,, | Performed by: INTERNAL MEDICINE

## 2021-04-29 PROCEDURE — 85025 COMPLETE CBC W/AUTO DIFF WBC: CPT | Performed by: NURSE PRACTITIONER

## 2021-04-29 PROCEDURE — 99223 1ST HOSP IP/OBS HIGH 75: CPT | Mod: ,,, | Performed by: PODIATRIST

## 2021-04-29 PROCEDURE — 83735 ASSAY OF MAGNESIUM: CPT | Performed by: NURSE PRACTITIONER

## 2021-04-29 PROCEDURE — 99215 PR OFFICE/OUTPT VISIT, EST, LEVL V, 40-54 MIN: ICD-10-PCS | Mod: ,,, | Performed by: INTERNAL MEDICINE

## 2021-04-29 PROCEDURE — 63600175 PHARM REV CODE 636 W HCPCS: Performed by: INTERNAL MEDICINE

## 2021-04-29 PROCEDURE — 99223 PR INITIAL HOSPITAL CARE,LEVL III: ICD-10-PCS | Mod: ,,, | Performed by: PODIATRIST

## 2021-04-29 PROCEDURE — G0378 HOSPITAL OBSERVATION PER HR: HCPCS

## 2021-04-29 PROCEDURE — 36415 COLL VENOUS BLD VENIPUNCTURE: CPT | Performed by: NURSE PRACTITIONER

## 2021-04-29 PROCEDURE — 63600175 PHARM REV CODE 636 W HCPCS: Performed by: NURSE PRACTITIONER

## 2021-04-29 PROCEDURE — 96365 THER/PROPH/DIAG IV INF INIT: CPT

## 2021-04-29 PROCEDURE — 25000003 PHARM REV CODE 250: Performed by: INTERNAL MEDICINE

## 2021-04-29 PROCEDURE — 96372 THER/PROPH/DIAG INJ SC/IM: CPT | Mod: 59

## 2021-04-29 PROCEDURE — 96376 TX/PRO/DX INJ SAME DRUG ADON: CPT

## 2021-04-29 PROCEDURE — 96375 TX/PRO/DX INJ NEW DRUG ADDON: CPT

## 2021-04-29 PROCEDURE — 25000003 PHARM REV CODE 250: Performed by: NURSE PRACTITIONER

## 2021-04-29 PROCEDURE — 99900035 HC TECH TIME PER 15 MIN (STAT)

## 2021-04-29 PROCEDURE — 80053 COMPREHEN METABOLIC PANEL: CPT | Performed by: NURSE PRACTITIONER

## 2021-04-29 RX ORDER — HEPARIN SODIUM 5000 [USP'U]/ML
5000 INJECTION, SOLUTION INTRAVENOUS; SUBCUTANEOUS EVERY 8 HOURS
Status: DISCONTINUED | OUTPATIENT
Start: 2021-04-29 | End: 2021-05-02

## 2021-04-29 RX ORDER — DOBUTAMINE HYDROCHLORIDE 400 MG/100ML
5 INJECTION INTRAVENOUS CONTINUOUS
Status: DISCONTINUED | OUTPATIENT
Start: 2021-04-29 | End: 2021-05-01

## 2021-04-29 RX ORDER — CALCITRIOL 0.25 UG/1
0.25 CAPSULE ORAL DAILY
Status: DISCONTINUED | OUTPATIENT
Start: 2021-04-30 | End: 2021-05-03 | Stop reason: HOSPADM

## 2021-04-29 RX ADMIN — SIMVASTATIN 10 MG: 5 TABLET, FILM COATED ORAL at 08:04

## 2021-04-29 RX ADMIN — HEPARIN SODIUM 5000 UNITS: 5000 INJECTION INTRAVENOUS; SUBCUTANEOUS at 11:04

## 2021-04-29 RX ADMIN — SACUBITRIL AND VALSARTAN 1 TABLET: 24; 26 TABLET, FILM COATED ORAL at 08:04

## 2021-04-29 RX ADMIN — BRIMONIDINE TARTRATE 1 DROP: 2 SOLUTION OPHTHALMIC at 09:04

## 2021-04-29 RX ADMIN — METOPROLOL TARTRATE 25 MG: 25 TABLET, FILM COATED ORAL at 08:04

## 2021-04-29 RX ADMIN — SODIUM BICARBONATE 650 MG: 650 TABLET ORAL at 08:04

## 2021-04-29 RX ADMIN — PIPERACILLIN AND TAZOBACTAM 4.5 G: 4; .5 INJECTION, POWDER, LYOPHILIZED, FOR SOLUTION INTRAVENOUS; PARENTERAL at 09:04

## 2021-04-29 RX ADMIN — FUROSEMIDE 40 MG: 10 INJECTION, SOLUTION INTRAMUSCULAR; INTRAVENOUS at 06:04

## 2021-04-29 RX ADMIN — CEFTRIAXONE 2 G: 2 INJECTION, SOLUTION INTRAVENOUS at 08:04

## 2021-04-29 RX ADMIN — DOBUTAMINE HYDROCHLORIDE 2.5 MCG/KG/MIN: 400 INJECTION INTRAVENOUS at 04:04

## 2021-04-29 RX ADMIN — ASPIRIN 81 MG: 81 TABLET, COATED ORAL at 06:04

## 2021-04-29 RX ADMIN — DAPTOMYCIN 825 MG: 500 INJECTION, POWDER, LYOPHILIZED, FOR SOLUTION INTRAVENOUS at 10:04

## 2021-04-29 RX ADMIN — TIMOLOL MALEATE 1 DROP: 5 SOLUTION OPHTHALMIC at 09:04

## 2021-04-29 RX ADMIN — ALLOPURINOL 100 MG: 100 TABLET ORAL at 08:04

## 2021-04-29 RX ADMIN — Medication 1000 UNITS: at 08:04

## 2021-04-29 RX ADMIN — PANTOPRAZOLE SODIUM 40 MG: 40 TABLET, DELAYED RELEASE ORAL at 08:04

## 2021-04-30 PROBLEM — N17.9 AKI (ACUTE KIDNEY INJURY): Status: ACTIVE | Noted: 2021-04-30

## 2021-04-30 LAB
ALBUMIN SERPL BCP-MCNC: 2 G/DL (ref 3.5–5.2)
ALP SERPL-CCNC: 68 U/L (ref 55–135)
ALT SERPL W/O P-5'-P-CCNC: 7 U/L (ref 10–44)
ANION GAP SERPL CALC-SCNC: 9 MMOL/L (ref 8–16)
AST SERPL-CCNC: 22 U/L (ref 10–40)
BACTERIA UR CULT: NO GROWTH
BASOPHILS # BLD AUTO: 0.03 K/UL (ref 0–0.2)
BASOPHILS NFR BLD: 0.4 % (ref 0–1.9)
BILIRUB SERPL-MCNC: 0.5 MG/DL (ref 0.1–1)
BUN SERPL-MCNC: 46 MG/DL (ref 8–23)
CALCIUM SERPL-MCNC: 7.7 MG/DL (ref 8.7–10.5)
CHLORIDE SERPL-SCNC: 111 MMOL/L (ref 95–110)
CO2 SERPL-SCNC: 20 MMOL/L (ref 23–29)
CREAT SERPL-MCNC: 4.3 MG/DL (ref 0.5–1.4)
DIFFERENTIAL METHOD: ABNORMAL
EOSINOPHIL # BLD AUTO: 0.1 K/UL (ref 0–0.5)
EOSINOPHIL NFR BLD: 1.6 % (ref 0–8)
ERYTHROCYTE [DISTWIDTH] IN BLOOD BY AUTOMATED COUNT: 29.2 % (ref 11.5–14.5)
EST. GFR  (AFRICAN AMERICAN): 15 ML/MIN/1.73 M^2
EST. GFR  (NON AFRICAN AMERICAN): 13 ML/MIN/1.73 M^2
GLUCOSE SERPL-MCNC: 77 MG/DL (ref 70–110)
HCT VFR BLD AUTO: 27 % (ref 40–54)
HGB BLD-MCNC: 7.7 G/DL (ref 14–18)
IMM GRANULOCYTES # BLD AUTO: 0.04 K/UL (ref 0–0.04)
IMM GRANULOCYTES NFR BLD AUTO: 0.6 % (ref 0–0.5)
LYMPHOCYTES # BLD AUTO: 1.1 K/UL (ref 1–4.8)
LYMPHOCYTES NFR BLD: 16.2 % (ref 18–48)
MAGNESIUM SERPL-MCNC: 1.6 MG/DL (ref 1.6–2.6)
MCH RBC QN AUTO: 24.4 PG (ref 27–31)
MCHC RBC AUTO-ENTMCNC: 28.5 G/DL (ref 32–36)
MCV RBC AUTO: 85 FL (ref 82–98)
MONOCYTES # BLD AUTO: 0.7 K/UL (ref 0.3–1)
MONOCYTES NFR BLD: 10.2 % (ref 4–15)
NEUTROPHILS # BLD AUTO: 4.8 K/UL (ref 1.8–7.7)
NEUTROPHILS NFR BLD: 71 % (ref 38–73)
NRBC BLD-RTO: 0 /100 WBC
PHOSPHATE SERPL-MCNC: 3.7 MG/DL (ref 2.7–4.5)
PLATELET # BLD AUTO: 132 K/UL (ref 150–450)
PMV BLD AUTO: ABNORMAL FL (ref 9.2–12.9)
POCT GLUCOSE: 127 MG/DL (ref 70–110)
POCT GLUCOSE: 140 MG/DL (ref 70–110)
POCT GLUCOSE: 147 MG/DL (ref 70–110)
POTASSIUM SERPL-SCNC: 3.9 MMOL/L (ref 3.5–5.1)
PROT SERPL-MCNC: 5.6 G/DL (ref 6–8.4)
RBC # BLD AUTO: 3.16 M/UL (ref 4.6–6.2)
SODIUM SERPL-SCNC: 140 MMOL/L (ref 136–145)
WBC # BLD AUTO: 6.74 K/UL (ref 3.9–12.7)

## 2021-04-30 PROCEDURE — 25000003 PHARM REV CODE 250: Performed by: NURSE PRACTITIONER

## 2021-04-30 PROCEDURE — 80053 COMPREHEN METABOLIC PANEL: CPT | Performed by: NURSE PRACTITIONER

## 2021-04-30 PROCEDURE — 84100 ASSAY OF PHOSPHORUS: CPT | Performed by: INTERNAL MEDICINE

## 2021-04-30 PROCEDURE — 25000003 PHARM REV CODE 250: Performed by: INTERNAL MEDICINE

## 2021-04-30 PROCEDURE — 99233 PR SUBSEQUENT HOSPITAL CARE,LEVL III: ICD-10-PCS | Mod: NSCH,,, | Performed by: INTERNAL MEDICINE

## 2021-04-30 PROCEDURE — 99233 SBSQ HOSP IP/OBS HIGH 50: CPT | Mod: NSCH,,, | Performed by: INTERNAL MEDICINE

## 2021-04-30 PROCEDURE — 96372 THER/PROPH/DIAG INJ SC/IM: CPT

## 2021-04-30 PROCEDURE — 99232 SBSQ HOSP IP/OBS MODERATE 35: CPT | Mod: ,,, | Performed by: INTERNAL MEDICINE

## 2021-04-30 PROCEDURE — 63600175 PHARM REV CODE 636 W HCPCS: Performed by: INTERNAL MEDICINE

## 2021-04-30 PROCEDURE — 21400001 HC TELEMETRY ROOM

## 2021-04-30 PROCEDURE — 96366 THER/PROPH/DIAG IV INF ADDON: CPT

## 2021-04-30 PROCEDURE — 27000190 HC CPAP FULL FACE MASK W/VALVE

## 2021-04-30 PROCEDURE — 94761 N-INVAS EAR/PLS OXIMETRY MLT: CPT

## 2021-04-30 PROCEDURE — 99233 SBSQ HOSP IP/OBS HIGH 50: CPT | Mod: ,,, | Performed by: INTERNAL MEDICINE

## 2021-04-30 PROCEDURE — 36415 COLL VENOUS BLD VENIPUNCTURE: CPT | Performed by: NURSE PRACTITIONER

## 2021-04-30 PROCEDURE — 94660 CPAP INITIATION&MGMT: CPT

## 2021-04-30 PROCEDURE — 99233 PR SUBSEQUENT HOSPITAL CARE,LEVL III: ICD-10-PCS | Mod: ,,, | Performed by: INTERNAL MEDICINE

## 2021-04-30 PROCEDURE — 63600175 PHARM REV CODE 636 W HCPCS: Performed by: NURSE PRACTITIONER

## 2021-04-30 PROCEDURE — 85025 COMPLETE CBC W/AUTO DIFF WBC: CPT | Performed by: NURSE PRACTITIONER

## 2021-04-30 PROCEDURE — 96376 TX/PRO/DX INJ SAME DRUG ADON: CPT

## 2021-04-30 PROCEDURE — 99900035 HC TECH TIME PER 15 MIN (STAT)

## 2021-04-30 PROCEDURE — 83735 ASSAY OF MAGNESIUM: CPT | Performed by: NURSE PRACTITIONER

## 2021-04-30 PROCEDURE — 99232 PR SUBSEQUENT HOSPITAL CARE,LEVL II: ICD-10-PCS | Mod: ,,, | Performed by: INTERNAL MEDICINE

## 2021-04-30 RX ORDER — MUPIROCIN 20 MG/G
OINTMENT TOPICAL 2 TIMES DAILY
Status: DISCONTINUED | OUTPATIENT
Start: 2021-04-30 | End: 2021-05-03 | Stop reason: HOSPADM

## 2021-04-30 RX ADMIN — PIPERACILLIN AND TAZOBACTAM 4.5 G: 4; .5 INJECTION, POWDER, LYOPHILIZED, FOR SOLUTION INTRAVENOUS; PARENTERAL at 09:04

## 2021-04-30 RX ADMIN — ASPIRIN 81 MG: 81 TABLET, COATED ORAL at 08:04

## 2021-04-30 RX ADMIN — ALLOPURINOL 100 MG: 100 TABLET ORAL at 09:04

## 2021-04-30 RX ADMIN — SODIUM BICARBONATE 650 MG: 650 TABLET ORAL at 09:04

## 2021-04-30 RX ADMIN — BRIMONIDINE TARTRATE 1 DROP: 2 SOLUTION OPHTHALMIC at 09:04

## 2021-04-30 RX ADMIN — CALCITRIOL CAPSULES 0.25 MCG 0.25 MCG: 0.25 CAPSULE ORAL at 09:04

## 2021-04-30 RX ADMIN — MUPIROCIN: 20 OINTMENT TOPICAL at 11:04

## 2021-04-30 RX ADMIN — PANTOPRAZOLE SODIUM 40 MG: 40 TABLET, DELAYED RELEASE ORAL at 09:04

## 2021-04-30 RX ADMIN — HEPARIN SODIUM 5000 UNITS: 5000 INJECTION INTRAVENOUS; SUBCUTANEOUS at 06:04

## 2021-04-30 RX ADMIN — SODIUM BICARBONATE 650 MG: 650 TABLET ORAL at 02:04

## 2021-04-30 RX ADMIN — MUPIROCIN: 20 OINTMENT TOPICAL at 09:04

## 2021-04-30 RX ADMIN — TIMOLOL MALEATE 1 DROP: 5 SOLUTION OPHTHALMIC at 09:04

## 2021-04-30 RX ADMIN — METOPROLOL TARTRATE 25 MG: 25 TABLET, FILM COATED ORAL at 08:04

## 2021-04-30 RX ADMIN — METOPROLOL TARTRATE 25 MG: 25 TABLET, FILM COATED ORAL at 09:04

## 2021-04-30 RX ADMIN — SODIUM BICARBONATE 650 MG: 650 TABLET ORAL at 08:04

## 2021-04-30 RX ADMIN — TIMOLOL MALEATE 1 DROP: 5 SOLUTION OPHTHALMIC at 08:04

## 2021-04-30 RX ADMIN — SIMVASTATIN 10 MG: 5 TABLET, FILM COATED ORAL at 08:04

## 2021-04-30 RX ADMIN — ONDANSETRON 4 MG: 2 INJECTION INTRAMUSCULAR; INTRAVENOUS at 10:04

## 2021-04-30 RX ADMIN — Medication 1000 UNITS: at 09:04

## 2021-04-30 RX ADMIN — HEPARIN SODIUM 5000 UNITS: 5000 INJECTION INTRAVENOUS; SUBCUTANEOUS at 02:04

## 2021-04-30 RX ADMIN — HEPARIN SODIUM 5000 UNITS: 5000 INJECTION INTRAVENOUS; SUBCUTANEOUS at 10:04

## 2021-04-30 RX ADMIN — FUROSEMIDE 40 MG: 10 INJECTION, SOLUTION INTRAMUSCULAR; INTRAVENOUS at 06:04

## 2021-04-30 RX ADMIN — BIMATOPROST 1 DROP: 0.1 SOLUTION/ DROPS OPHTHALMIC at 09:04

## 2021-05-01 LAB
ALBUMIN SERPL BCP-MCNC: 2.1 G/DL (ref 3.5–5.2)
ALP SERPL-CCNC: 67 U/L (ref 55–135)
ALT SERPL W/O P-5'-P-CCNC: 8 U/L (ref 10–44)
ANION GAP SERPL CALC-SCNC: 9 MMOL/L (ref 8–16)
AST SERPL-CCNC: 24 U/L (ref 10–40)
BASOPHILS # BLD AUTO: 0.02 K/UL (ref 0–0.2)
BASOPHILS NFR BLD: 0.3 % (ref 0–1.9)
BILIRUB SERPL-MCNC: 0.5 MG/DL (ref 0.1–1)
BUN SERPL-MCNC: 47 MG/DL (ref 8–23)
CALCIUM SERPL-MCNC: 7.8 MG/DL (ref 8.7–10.5)
CHLORIDE SERPL-SCNC: 108 MMOL/L (ref 95–110)
CO2 SERPL-SCNC: 21 MMOL/L (ref 23–29)
CREAT SERPL-MCNC: 4.6 MG/DL (ref 0.5–1.4)
DIFFERENTIAL METHOD: ABNORMAL
EOSINOPHIL # BLD AUTO: 0 K/UL (ref 0–0.5)
EOSINOPHIL NFR BLD: 0.6 % (ref 0–8)
ERYTHROCYTE [DISTWIDTH] IN BLOOD BY AUTOMATED COUNT: 28.9 % (ref 11.5–14.5)
EST. GFR  (AFRICAN AMERICAN): 14 ML/MIN/1.73 M^2
EST. GFR  (NON AFRICAN AMERICAN): 12 ML/MIN/1.73 M^2
GLUCOSE SERPL-MCNC: 98 MG/DL (ref 70–110)
HCT VFR BLD AUTO: 26.7 % (ref 40–54)
HGB BLD-MCNC: 7.6 G/DL (ref 14–18)
IMM GRANULOCYTES # BLD AUTO: 0.02 K/UL (ref 0–0.04)
IMM GRANULOCYTES NFR BLD AUTO: 0.3 % (ref 0–0.5)
LYMPHOCYTES # BLD AUTO: 1.1 K/UL (ref 1–4.8)
LYMPHOCYTES NFR BLD: 15.8 % (ref 18–48)
MAGNESIUM SERPL-MCNC: 1.6 MG/DL (ref 1.6–2.6)
MCH RBC QN AUTO: 24.1 PG (ref 27–31)
MCHC RBC AUTO-ENTMCNC: 28.5 G/DL (ref 32–36)
MCV RBC AUTO: 85 FL (ref 82–98)
MONOCYTES # BLD AUTO: 0.7 K/UL (ref 0.3–1)
MONOCYTES NFR BLD: 10.2 % (ref 4–15)
NEUTROPHILS # BLD AUTO: 4.8 K/UL (ref 1.8–7.7)
NEUTROPHILS NFR BLD: 72.8 % (ref 38–73)
NRBC BLD-RTO: 0 /100 WBC
PLATELET # BLD AUTO: 130 K/UL (ref 150–450)
PMV BLD AUTO: 10.9 FL (ref 9.2–12.9)
POCT GLUCOSE: 109 MG/DL (ref 70–110)
POTASSIUM SERPL-SCNC: 4 MMOL/L (ref 3.5–5.1)
PROT SERPL-MCNC: 6 G/DL (ref 6–8.4)
RBC # BLD AUTO: 3.16 M/UL (ref 4.6–6.2)
SODIUM SERPL-SCNC: 138 MMOL/L (ref 136–145)
WBC # BLD AUTO: 6.65 K/UL (ref 3.9–12.7)

## 2021-05-01 PROCEDURE — 21400001 HC TELEMETRY ROOM

## 2021-05-01 PROCEDURE — 25000003 PHARM REV CODE 250: Performed by: INTERNAL MEDICINE

## 2021-05-01 PROCEDURE — 99233 PR SUBSEQUENT HOSPITAL CARE,LEVL III: ICD-10-PCS | Mod: ,,, | Performed by: INTERNAL MEDICINE

## 2021-05-01 PROCEDURE — C1751 CATH, INF, PER/CENT/MIDLINE: HCPCS

## 2021-05-01 PROCEDURE — 99232 SBSQ HOSP IP/OBS MODERATE 35: CPT | Mod: ,,, | Performed by: INTERNAL MEDICINE

## 2021-05-01 PROCEDURE — 63600175 PHARM REV CODE 636 W HCPCS: Performed by: NURSE PRACTITIONER

## 2021-05-01 PROCEDURE — 63600175 PHARM REV CODE 636 W HCPCS: Performed by: INTERNAL MEDICINE

## 2021-05-01 PROCEDURE — 85025 COMPLETE CBC W/AUTO DIFF WBC: CPT | Performed by: NURSE PRACTITIONER

## 2021-05-01 PROCEDURE — 80053 COMPREHEN METABOLIC PANEL: CPT | Performed by: NURSE PRACTITIONER

## 2021-05-01 PROCEDURE — 83735 ASSAY OF MAGNESIUM: CPT | Performed by: NURSE PRACTITIONER

## 2021-05-01 PROCEDURE — 99232 PR SUBSEQUENT HOSPITAL CARE,LEVL II: ICD-10-PCS | Mod: ,,, | Performed by: INTERNAL MEDICINE

## 2021-05-01 PROCEDURE — 99900035 HC TECH TIME PER 15 MIN (STAT)

## 2021-05-01 PROCEDURE — 36573 INSJ PICC RS&I 5 YR+: CPT

## 2021-05-01 PROCEDURE — 25000003 PHARM REV CODE 250: Performed by: NURSE PRACTITIONER

## 2021-05-01 PROCEDURE — 94761 N-INVAS EAR/PLS OXIMETRY MLT: CPT

## 2021-05-01 PROCEDURE — 99233 SBSQ HOSP IP/OBS HIGH 50: CPT | Mod: ,,, | Performed by: INTERNAL MEDICINE

## 2021-05-01 RX ORDER — FUROSEMIDE 40 MG/1
40 TABLET ORAL 2 TIMES DAILY
Status: DISCONTINUED | OUTPATIENT
Start: 2021-05-01 | End: 2021-05-03 | Stop reason: HOSPADM

## 2021-05-01 RX ADMIN — TIMOLOL MALEATE 1 DROP: 5 SOLUTION OPHTHALMIC at 08:05

## 2021-05-01 RX ADMIN — ALLOPURINOL 100 MG: 100 TABLET ORAL at 08:05

## 2021-05-01 RX ADMIN — HEPARIN SODIUM 5000 UNITS: 5000 INJECTION INTRAVENOUS; SUBCUTANEOUS at 09:05

## 2021-05-01 RX ADMIN — PIPERACILLIN AND TAZOBACTAM 4.5 G: 4; .5 INJECTION, POWDER, LYOPHILIZED, FOR SOLUTION INTRAVENOUS; PARENTERAL at 08:05

## 2021-05-01 RX ADMIN — HEPARIN SODIUM 5000 UNITS: 5000 INJECTION INTRAVENOUS; SUBCUTANEOUS at 02:05

## 2021-05-01 RX ADMIN — SIMVASTATIN 10 MG: 5 TABLET, FILM COATED ORAL at 08:05

## 2021-05-01 RX ADMIN — PANTOPRAZOLE SODIUM 40 MG: 40 TABLET, DELAYED RELEASE ORAL at 08:05

## 2021-05-01 RX ADMIN — ASPIRIN 81 MG: 81 TABLET, COATED ORAL at 08:05

## 2021-05-01 RX ADMIN — CALCITRIOL CAPSULES 0.25 MCG 0.25 MCG: 0.25 CAPSULE ORAL at 08:05

## 2021-05-01 RX ADMIN — SODIUM BICARBONATE 650 MG: 650 TABLET ORAL at 09:05

## 2021-05-01 RX ADMIN — METOPROLOL TARTRATE 25 MG: 25 TABLET, FILM COATED ORAL at 08:05

## 2021-05-01 RX ADMIN — DAPTOMYCIN 825 MG: 500 INJECTION, POWDER, LYOPHILIZED, FOR SOLUTION INTRAVENOUS at 11:05

## 2021-05-01 RX ADMIN — BIMATOPROST 1 DROP: 0.1 SOLUTION/ DROPS OPHTHALMIC at 08:05

## 2021-05-01 RX ADMIN — SODIUM BICARBONATE 650 MG: 650 TABLET ORAL at 08:05

## 2021-05-01 RX ADMIN — FUROSEMIDE 40 MG: 40 TABLET ORAL at 05:05

## 2021-05-01 RX ADMIN — BRIMONIDINE TARTRATE 1 DROP: 2 SOLUTION OPHTHALMIC at 08:05

## 2021-05-01 RX ADMIN — Medication 1000 UNITS: at 08:05

## 2021-05-01 RX ADMIN — FUROSEMIDE 40 MG: 10 INJECTION, SOLUTION INTRAMUSCULAR; INTRAVENOUS at 05:05

## 2021-05-01 RX ADMIN — MUPIROCIN: 20 OINTMENT TOPICAL at 08:05

## 2021-05-01 RX ADMIN — SODIUM BICARBONATE 650 MG: 650 TABLET ORAL at 02:05

## 2021-05-01 RX ADMIN — HEPARIN SODIUM 5000 UNITS: 5000 INJECTION INTRAVENOUS; SUBCUTANEOUS at 05:05

## 2021-05-02 ENCOUNTER — ANESTHESIA EVENT (OUTPATIENT)
Dept: SURGERY | Facility: HOSPITAL | Age: 66
DRG: 622 | End: 2021-05-02
Payer: MEDICARE

## 2021-05-02 ENCOUNTER — ANESTHESIA (OUTPATIENT)
Dept: SURGERY | Facility: HOSPITAL | Age: 66
DRG: 622 | End: 2021-05-02
Payer: MEDICARE

## 2021-05-02 LAB
ALBUMIN SERPL BCP-MCNC: 2.2 G/DL (ref 3.5–5.2)
ALP SERPL-CCNC: 62 U/L (ref 55–135)
ALT SERPL W/O P-5'-P-CCNC: 9 U/L (ref 10–44)
ANION GAP SERPL CALC-SCNC: 10 MMOL/L (ref 8–16)
AST SERPL-CCNC: 27 U/L (ref 10–40)
BASOPHILS # BLD AUTO: 0.04 K/UL (ref 0–0.2)
BASOPHILS NFR BLD: 0.6 % (ref 0–1.9)
BILIRUB SERPL-MCNC: 0.5 MG/DL (ref 0.1–1)
BUN SERPL-MCNC: 51 MG/DL (ref 8–23)
CALCIUM SERPL-MCNC: 8.3 MG/DL (ref 8.7–10.5)
CHLORIDE SERPL-SCNC: 107 MMOL/L (ref 95–110)
CO2 SERPL-SCNC: 21 MMOL/L (ref 23–29)
CREAT SERPL-MCNC: 5.3 MG/DL (ref 0.5–1.4)
DIFFERENTIAL METHOD: ABNORMAL
EOSINOPHIL # BLD AUTO: 0.1 K/UL (ref 0–0.5)
EOSINOPHIL NFR BLD: 1.4 % (ref 0–8)
ERYTHROCYTE [DISTWIDTH] IN BLOOD BY AUTOMATED COUNT: 28.7 % (ref 11.5–14.5)
EST. GFR  (AFRICAN AMERICAN): 12 ML/MIN/1.73 M^2
EST. GFR  (NON AFRICAN AMERICAN): 10 ML/MIN/1.73 M^2
GLUCOSE SERPL-MCNC: 94 MG/DL (ref 70–110)
HCT VFR BLD AUTO: 29.3 % (ref 40–54)
HGB BLD-MCNC: 8.4 G/DL (ref 14–18)
IMM GRANULOCYTES # BLD AUTO: 0.03 K/UL (ref 0–0.04)
IMM GRANULOCYTES NFR BLD AUTO: 0.4 % (ref 0–0.5)
LYMPHOCYTES # BLD AUTO: 1.6 K/UL (ref 1–4.8)
LYMPHOCYTES NFR BLD: 22.9 % (ref 18–48)
MAGNESIUM SERPL-MCNC: 1.6 MG/DL (ref 1.6–2.6)
MCH RBC QN AUTO: 24.3 PG (ref 27–31)
MCHC RBC AUTO-ENTMCNC: 28.7 G/DL (ref 32–36)
MCV RBC AUTO: 85 FL (ref 82–98)
MONOCYTES # BLD AUTO: 0.8 K/UL (ref 0.3–1)
MONOCYTES NFR BLD: 12 % (ref 4–15)
NEUTROPHILS # BLD AUTO: 4.3 K/UL (ref 1.8–7.7)
NEUTROPHILS NFR BLD: 62.7 % (ref 38–73)
NRBC BLD-RTO: 0 /100 WBC
PLATELET # BLD AUTO: 127 K/UL (ref 150–450)
PMV BLD AUTO: ABNORMAL FL (ref 9.2–12.9)
POCT GLUCOSE: 101 MG/DL (ref 70–110)
POCT GLUCOSE: 113 MG/DL (ref 70–110)
POCT GLUCOSE: 123 MG/DL (ref 70–110)
POTASSIUM SERPL-SCNC: 4.2 MMOL/L (ref 3.5–5.1)
PROT SERPL-MCNC: 6.4 G/DL (ref 6–8.4)
RBC # BLD AUTO: 3.45 M/UL (ref 4.6–6.2)
SARS-COV-2 RDRP RESP QL NAA+PROBE: NEGATIVE
SODIUM SERPL-SCNC: 138 MMOL/L (ref 136–145)
WBC # BLD AUTO: 6.9 K/UL (ref 3.9–12.7)

## 2021-05-02 PROCEDURE — 25000003 PHARM REV CODE 250: Performed by: INTERNAL MEDICINE

## 2021-05-02 PROCEDURE — 63600175 PHARM REV CODE 636 W HCPCS: Performed by: INTERNAL MEDICINE

## 2021-05-02 PROCEDURE — 88305 TISSUE EXAM BY PATHOLOGIST: ICD-10-PCS | Mod: 26,,, | Performed by: STUDENT IN AN ORGANIZED HEALTH CARE EDUCATION/TRAINING PROGRAM

## 2021-05-02 PROCEDURE — 88305 TISSUE EXAM BY PATHOLOGIST: CPT | Performed by: STUDENT IN AN ORGANIZED HEALTH CARE EDUCATION/TRAINING PROGRAM

## 2021-05-02 PROCEDURE — 21400001 HC TELEMETRY ROOM

## 2021-05-02 PROCEDURE — 25000003 PHARM REV CODE 250: Performed by: NURSE PRACTITIONER

## 2021-05-02 PROCEDURE — 28805 AMPUTATION THRU METATARSAL: CPT | Mod: RT,,, | Performed by: PODIATRIST

## 2021-05-02 PROCEDURE — 71000033 HC RECOVERY, INTIAL HOUR: Performed by: PODIATRIST

## 2021-05-02 PROCEDURE — 99233 PR SUBSEQUENT HOSPITAL CARE,LEVL III: ICD-10-PCS | Mod: ,,, | Performed by: INTERNAL MEDICINE

## 2021-05-02 PROCEDURE — 85025 COMPLETE CBC W/AUTO DIFF WBC: CPT | Performed by: NURSE PRACTITIONER

## 2021-05-02 PROCEDURE — 88311 DECALCIFY TISSUE: CPT | Performed by: STUDENT IN AN ORGANIZED HEALTH CARE EDUCATION/TRAINING PROGRAM

## 2021-05-02 PROCEDURE — 36000707: Performed by: PODIATRIST

## 2021-05-02 PROCEDURE — 25000003 PHARM REV CODE 250: Performed by: NURSE ANESTHETIST, CERTIFIED REGISTERED

## 2021-05-02 PROCEDURE — 37000008 HC ANESTHESIA 1ST 15 MINUTES: Performed by: PODIATRIST

## 2021-05-02 PROCEDURE — 99233 SBSQ HOSP IP/OBS HIGH 50: CPT | Mod: NSCH,,, | Performed by: INTERNAL MEDICINE

## 2021-05-02 PROCEDURE — 80053 COMPREHEN METABOLIC PANEL: CPT | Performed by: NURSE PRACTITIONER

## 2021-05-02 PROCEDURE — 28805 PR AMPUTATION FOOT,TRANSMETATARSAL: ICD-10-PCS | Mod: RT,,, | Performed by: PODIATRIST

## 2021-05-02 PROCEDURE — 27201423 OPTIME MED/SURG SUP & DEVICES STERILE SUPPLY: Performed by: PODIATRIST

## 2021-05-02 PROCEDURE — 63600175 PHARM REV CODE 636 W HCPCS: Performed by: NURSE ANESTHETIST, CERTIFIED REGISTERED

## 2021-05-02 PROCEDURE — U0002 COVID-19 LAB TEST NON-CDC: HCPCS | Performed by: INTERNAL MEDICINE

## 2021-05-02 PROCEDURE — 88311 PR  DECALCIFY TISSUE: ICD-10-PCS | Mod: 26,,, | Performed by: STUDENT IN AN ORGANIZED HEALTH CARE EDUCATION/TRAINING PROGRAM

## 2021-05-02 PROCEDURE — 88311 DECALCIFY TISSUE: CPT | Mod: 26,,, | Performed by: STUDENT IN AN ORGANIZED HEALTH CARE EDUCATION/TRAINING PROGRAM

## 2021-05-02 PROCEDURE — 36000706: Performed by: PODIATRIST

## 2021-05-02 PROCEDURE — 11042 DBRDMT SUBQ TIS 1ST 20SQCM/<: CPT | Mod: 59,,, | Performed by: PODIATRIST

## 2021-05-02 PROCEDURE — 83735 ASSAY OF MAGNESIUM: CPT | Performed by: NURSE PRACTITIONER

## 2021-05-02 PROCEDURE — 25000003 PHARM REV CODE 250: Performed by: PODIATRIST

## 2021-05-02 PROCEDURE — 99233 SBSQ HOSP IP/OBS HIGH 50: CPT | Mod: ,,, | Performed by: INTERNAL MEDICINE

## 2021-05-02 PROCEDURE — 88305 TISSUE EXAM BY PATHOLOGIST: CPT | Mod: 26,,, | Performed by: STUDENT IN AN ORGANIZED HEALTH CARE EDUCATION/TRAINING PROGRAM

## 2021-05-02 PROCEDURE — 11042 PR DEBRIDEMENT, SKIN, SUB-Q TISSUE,=<20 SQ CM: ICD-10-PCS | Mod: 59,,, | Performed by: PODIATRIST

## 2021-05-02 PROCEDURE — 99233 PR SUBSEQUENT HOSPITAL CARE,LEVL III: ICD-10-PCS | Mod: NSCH,,, | Performed by: INTERNAL MEDICINE

## 2021-05-02 PROCEDURE — 37000009 HC ANESTHESIA EA ADD 15 MINS: Performed by: PODIATRIST

## 2021-05-02 RX ORDER — ONDANSETRON 2 MG/ML
4 INJECTION INTRAMUSCULAR; INTRAVENOUS DAILY PRN
Status: DISCONTINUED | OUTPATIENT
Start: 2021-05-02 | End: 2021-05-03 | Stop reason: HOSPADM

## 2021-05-02 RX ORDER — SODIUM CHLORIDE 9 MG/ML
INJECTION, SOLUTION INTRAVENOUS CONTINUOUS PRN
Status: DISCONTINUED | OUTPATIENT
Start: 2021-05-02 | End: 2021-05-02

## 2021-05-02 RX ORDER — MIDAZOLAM HYDROCHLORIDE 1 MG/ML
INJECTION INTRAMUSCULAR; INTRAVENOUS
Status: DISCONTINUED | OUTPATIENT
Start: 2021-05-02 | End: 2021-05-02

## 2021-05-02 RX ORDER — BUPIVACAINE HYDROCHLORIDE AND EPINEPHRINE 5; 5 MG/ML; UG/ML
INJECTION, SOLUTION EPIDURAL; INTRACAUDAL; PERINEURAL
Status: DISCONTINUED | OUTPATIENT
Start: 2021-05-02 | End: 2021-05-02 | Stop reason: HOSPADM

## 2021-05-02 RX ORDER — FENTANYL CITRATE 50 UG/ML
25 INJECTION, SOLUTION INTRAMUSCULAR; INTRAVENOUS EVERY 5 MIN PRN
Status: DISCONTINUED | OUTPATIENT
Start: 2021-05-02 | End: 2021-05-03 | Stop reason: HOSPADM

## 2021-05-02 RX ORDER — KETAMINE HYDROCHLORIDE 50 MG/ML
INJECTION, SOLUTION INTRAMUSCULAR; INTRAVENOUS
Status: DISCONTINUED | OUTPATIENT
Start: 2021-05-02 | End: 2021-05-02

## 2021-05-02 RX ADMIN — MIDAZOLAM HYDROCHLORIDE 1 MG: 1 INJECTION, SOLUTION INTRAMUSCULAR; INTRAVENOUS at 02:05

## 2021-05-02 RX ADMIN — SODIUM BICARBONATE 650 MG: 650 TABLET ORAL at 05:05

## 2021-05-02 RX ADMIN — GLYCOPYRROLATE 0.1 MCG: 0.2 INJECTION, SOLUTION INTRAMUSCULAR; INTRAVENOUS at 02:05

## 2021-05-02 RX ADMIN — ASPIRIN 81 MG: 81 TABLET, COATED ORAL at 09:05

## 2021-05-02 RX ADMIN — TIMOLOL MALEATE 1 DROP: 5 SOLUTION OPHTHALMIC at 08:05

## 2021-05-02 RX ADMIN — Medication 1000 UNITS: at 09:05

## 2021-05-02 RX ADMIN — TIMOLOL MALEATE 1 DROP: 5 SOLUTION OPHTHALMIC at 10:05

## 2021-05-02 RX ADMIN — METOPROLOL TARTRATE 25 MG: 25 TABLET, FILM COATED ORAL at 08:05

## 2021-05-02 RX ADMIN — MUPIROCIN: 20 OINTMENT TOPICAL at 10:05

## 2021-05-02 RX ADMIN — CALCITRIOL CAPSULES 0.25 MCG 0.25 MCG: 0.25 CAPSULE ORAL at 09:05

## 2021-05-02 RX ADMIN — HEPARIN SODIUM 5000 UNITS: 5000 INJECTION INTRAVENOUS; SUBCUTANEOUS at 05:05

## 2021-05-02 RX ADMIN — ALLOPURINOL 100 MG: 100 TABLET ORAL at 09:05

## 2021-05-02 RX ADMIN — BRIMONIDINE TARTRATE 1 DROP: 2 SOLUTION OPHTHALMIC at 10:05

## 2021-05-02 RX ADMIN — PIPERACILLIN AND TAZOBACTAM 4.5 G: 4; .5 INJECTION, POWDER, LYOPHILIZED, FOR SOLUTION INTRAVENOUS; PARENTERAL at 09:05

## 2021-05-02 RX ADMIN — MUPIROCIN: 20 OINTMENT TOPICAL at 08:05

## 2021-05-02 RX ADMIN — KETAMINE HYDROCHLORIDE 30 MG: 50 INJECTION INTRAMUSCULAR; INTRAVENOUS at 02:05

## 2021-05-02 RX ADMIN — BIMATOPROST 1 DROP: 0.1 SOLUTION/ DROPS OPHTHALMIC at 08:05

## 2021-05-02 RX ADMIN — MIDAZOLAM HYDROCHLORIDE 2 MG: 1 INJECTION, SOLUTION INTRAMUSCULAR; INTRAVENOUS at 02:05

## 2021-05-02 RX ADMIN — BIMATOPROST 1 DROP: 0.1 SOLUTION/ DROPS OPHTHALMIC at 10:05

## 2021-05-02 RX ADMIN — KETAMINE HYDROCHLORIDE 10 MG: 50 INJECTION INTRAMUSCULAR; INTRAVENOUS at 02:05

## 2021-05-02 RX ADMIN — PANTOPRAZOLE SODIUM 40 MG: 40 TABLET, DELAYED RELEASE ORAL at 09:05

## 2021-05-02 RX ADMIN — SODIUM BICARBONATE 650 MG: 650 TABLET ORAL at 09:05

## 2021-05-02 RX ADMIN — SODIUM CHLORIDE: 0.9 INJECTION, SOLUTION INTRAVENOUS at 02:05

## 2021-05-02 RX ADMIN — SODIUM BICARBONATE 650 MG: 650 TABLET ORAL at 08:05

## 2021-05-02 RX ADMIN — METOPROLOL TARTRATE 25 MG: 25 TABLET, FILM COATED ORAL at 09:05

## 2021-05-02 RX ADMIN — FUROSEMIDE 40 MG: 40 TABLET ORAL at 09:05

## 2021-05-02 RX ADMIN — BRIMONIDINE TARTRATE 1 DROP: 2 SOLUTION OPHTHALMIC at 08:05

## 2021-05-02 RX ADMIN — FUROSEMIDE 40 MG: 40 TABLET ORAL at 05:05

## 2021-05-02 RX ADMIN — SIMVASTATIN 10 MG: 5 TABLET, FILM COATED ORAL at 08:05

## 2021-05-03 ENCOUNTER — PATIENT OUTREACH (OUTPATIENT)
Dept: HOME HEALTH SERVICES | Facility: HOSPITAL | Age: 66
End: 2021-05-03

## 2021-05-03 VITALS
TEMPERATURE: 97 F | HEIGHT: 68 IN | HEART RATE: 60 BPM | OXYGEN SATURATION: 99 % | DIASTOLIC BLOOD PRESSURE: 69 MMHG | RESPIRATION RATE: 18 BRPM | SYSTOLIC BLOOD PRESSURE: 117 MMHG | BODY MASS INDEX: 35.45 KG/M2 | WEIGHT: 233.94 LBS

## 2021-05-03 LAB
ALBUMIN SERPL BCP-MCNC: 2 G/DL (ref 3.5–5.2)
ALP SERPL-CCNC: 52 U/L (ref 55–135)
ALT SERPL W/O P-5'-P-CCNC: 10 U/L (ref 10–44)
ANION GAP SERPL CALC-SCNC: 11 MMOL/L (ref 8–16)
AST SERPL-CCNC: 23 U/L (ref 10–40)
BASOPHILS # BLD AUTO: 0.04 K/UL (ref 0–0.2)
BASOPHILS NFR BLD: 0.6 % (ref 0–1.9)
BILIRUB SERPL-MCNC: 0.5 MG/DL (ref 0.1–1)
BUN SERPL-MCNC: 54 MG/DL (ref 8–23)
CALCIUM SERPL-MCNC: 7.8 MG/DL (ref 8.7–10.5)
CHLORIDE SERPL-SCNC: 107 MMOL/L (ref 95–110)
CO2 SERPL-SCNC: 19 MMOL/L (ref 23–29)
CREAT SERPL-MCNC: 6 MG/DL (ref 0.5–1.4)
DIFFERENTIAL METHOD: ABNORMAL
EOSINOPHIL # BLD AUTO: 0.1 K/UL (ref 0–0.5)
EOSINOPHIL NFR BLD: 1.5 % (ref 0–8)
ERYTHROCYTE [DISTWIDTH] IN BLOOD BY AUTOMATED COUNT: 28.2 % (ref 11.5–14.5)
EST. GFR  (AFRICAN AMERICAN): 10 ML/MIN/1.73 M^2
EST. GFR  (NON AFRICAN AMERICAN): 9 ML/MIN/1.73 M^2
GLUCOSE SERPL-MCNC: 69 MG/DL (ref 70–110)
HCT VFR BLD AUTO: 25.7 % (ref 40–54)
HGB BLD-MCNC: 7.5 G/DL (ref 14–18)
IMM GRANULOCYTES # BLD AUTO: 0.02 K/UL (ref 0–0.04)
IMM GRANULOCYTES NFR BLD AUTO: 0.3 % (ref 0–0.5)
LYMPHOCYTES # BLD AUTO: 1.6 K/UL (ref 1–4.8)
LYMPHOCYTES NFR BLD: 25.3 % (ref 18–48)
MAGNESIUM SERPL-MCNC: 1.6 MG/DL (ref 1.6–2.6)
MCH RBC QN AUTO: 24.7 PG (ref 27–31)
MCHC RBC AUTO-ENTMCNC: 29.2 G/DL (ref 32–36)
MCV RBC AUTO: 85 FL (ref 82–98)
MONOCYTES # BLD AUTO: 0.8 K/UL (ref 0.3–1)
MONOCYTES NFR BLD: 12.9 % (ref 4–15)
NEUTROPHILS # BLD AUTO: 3.7 K/UL (ref 1.8–7.7)
NEUTROPHILS NFR BLD: 59.4 % (ref 38–73)
NRBC BLD-RTO: 1 /100 WBC
PLATELET # BLD AUTO: 112 K/UL (ref 150–450)
PMV BLD AUTO: ABNORMAL FL (ref 9.2–12.9)
POCT GLUCOSE: 89 MG/DL (ref 70–110)
POCT GLUCOSE: 92 MG/DL (ref 70–110)
POTASSIUM SERPL-SCNC: 4.3 MMOL/L (ref 3.5–5.1)
PROT SERPL-MCNC: 5.7 G/DL (ref 6–8.4)
RBC # BLD AUTO: 3.04 M/UL (ref 4.6–6.2)
SODIUM SERPL-SCNC: 137 MMOL/L (ref 136–145)
WBC # BLD AUTO: 6.2 K/UL (ref 3.9–12.7)

## 2021-05-03 PROCEDURE — 25000003 PHARM REV CODE 250: Performed by: NURSE PRACTITIONER

## 2021-05-03 PROCEDURE — 25000003 PHARM REV CODE 250: Performed by: INTERNAL MEDICINE

## 2021-05-03 PROCEDURE — 99497 PR ADVNCD CARE PLAN 30 MIN: ICD-10-PCS | Mod: 25,,, | Performed by: INTERNAL MEDICINE

## 2021-05-03 PROCEDURE — 99223 1ST HOSP IP/OBS HIGH 75: CPT | Mod: ,,, | Performed by: INTERNAL MEDICINE

## 2021-05-03 PROCEDURE — 85025 COMPLETE CBC W/AUTO DIFF WBC: CPT | Performed by: NURSE PRACTITIONER

## 2021-05-03 PROCEDURE — 80053 COMPREHEN METABOLIC PANEL: CPT | Performed by: NURSE PRACTITIONER

## 2021-05-03 PROCEDURE — 99232 PR SUBSEQUENT HOSPITAL CARE,LEVL II: ICD-10-PCS | Mod: ,,, | Performed by: INTERNAL MEDICINE

## 2021-05-03 PROCEDURE — 99232 SBSQ HOSP IP/OBS MODERATE 35: CPT | Mod: ,,, | Performed by: INTERNAL MEDICINE

## 2021-05-03 PROCEDURE — 94760 N-INVAS EAR/PLS OXIMETRY 1: CPT

## 2021-05-03 PROCEDURE — 83735 ASSAY OF MAGNESIUM: CPT | Performed by: NURSE PRACTITIONER

## 2021-05-03 PROCEDURE — 99223 PR INITIAL HOSPITAL CARE,LEVL III: ICD-10-PCS | Mod: ,,, | Performed by: INTERNAL MEDICINE

## 2021-05-03 PROCEDURE — 99497 ADVNCD CARE PLAN 30 MIN: CPT | Mod: 25,,, | Performed by: INTERNAL MEDICINE

## 2021-05-03 RX ORDER — FUROSEMIDE 40 MG/1
40 TABLET ORAL 2 TIMES DAILY
Qty: 60 TABLET | Refills: 11 | Status: SHIPPED | OUTPATIENT
Start: 2021-05-03 | End: 2022-05-03

## 2021-05-03 RX ORDER — MORPHINE SULFATE 4 MG/ML
1 INJECTION, SOLUTION INTRAMUSCULAR; INTRAVENOUS ONCE
Status: DISCONTINUED | OUTPATIENT
Start: 2021-05-03 | End: 2021-05-03 | Stop reason: HOSPADM

## 2021-05-03 RX ORDER — DOXYCYCLINE HYCLATE 100 MG
100 TABLET ORAL EVERY 12 HOURS
Qty: 20 TABLET | Refills: 0 | Status: SHIPPED | OUTPATIENT
Start: 2021-05-03 | End: 2021-05-13

## 2021-05-03 RX ORDER — AMOXICILLIN AND CLAVULANATE POTASSIUM 500; 125 MG/1; MG/1
1 TABLET, FILM COATED ORAL 2 TIMES DAILY
Qty: 20 TABLET | Refills: 0 | Status: SHIPPED | OUTPATIENT
Start: 2021-05-03 | End: 2021-05-13

## 2021-05-03 RX ORDER — DOXYCYCLINE HYCLATE 100 MG
100 TABLET ORAL EVERY 12 HOURS
Status: DISCONTINUED | OUTPATIENT
Start: 2021-05-03 | End: 2021-05-03 | Stop reason: HOSPADM

## 2021-05-03 RX ORDER — AMOXICILLIN AND CLAVULANATE POTASSIUM 500; 125 MG/1; MG/1
1 TABLET, FILM COATED ORAL 2 TIMES DAILY
Status: DISCONTINUED | OUTPATIENT
Start: 2021-05-03 | End: 2021-05-03 | Stop reason: HOSPADM

## 2021-05-03 RX ADMIN — SODIUM BICARBONATE 650 MG: 650 TABLET ORAL at 09:05

## 2021-05-03 RX ADMIN — TIMOLOL MALEATE 1 DROP: 5 SOLUTION OPHTHALMIC at 09:05

## 2021-05-03 RX ADMIN — DOXYCYCLINE HYCLATE 100 MG: 100 TABLET, COATED ORAL at 09:05

## 2021-05-03 RX ADMIN — ACETAMINOPHEN 650 MG: 325 TABLET ORAL at 02:05

## 2021-05-03 RX ADMIN — Medication 1000 UNITS: at 09:05

## 2021-05-03 RX ADMIN — PANTOPRAZOLE SODIUM 40 MG: 40 TABLET, DELAYED RELEASE ORAL at 09:05

## 2021-05-03 RX ADMIN — FUROSEMIDE 40 MG: 40 TABLET ORAL at 09:05

## 2021-05-03 RX ADMIN — ALLOPURINOL 100 MG: 100 TABLET ORAL at 09:05

## 2021-05-03 RX ADMIN — BRIMONIDINE TARTRATE 1 DROP: 2 SOLUTION OPHTHALMIC at 09:05

## 2021-05-03 RX ADMIN — METOPROLOL TARTRATE 25 MG: 25 TABLET, FILM COATED ORAL at 09:05

## 2021-05-03 RX ADMIN — CALCITRIOL CAPSULES 0.25 MCG 0.25 MCG: 0.25 CAPSULE ORAL at 09:05

## 2021-05-03 RX ADMIN — ASPIRIN 81 MG: 81 TABLET, COATED ORAL at 09:05

## 2021-05-03 RX ADMIN — AMOXICILLIN AND CLAVULANATE POTASSIUM 500 MG: 500; 125 TABLET, FILM COATED ORAL at 09:05

## 2021-05-04 ENCOUNTER — DOCUMENTATION ONLY (OUTPATIENT)
Dept: TRANSPLANT | Facility: CLINIC | Age: 66
End: 2021-05-04

## 2021-05-04 ENCOUNTER — PES CALL (OUTPATIENT)
Dept: ADMINISTRATIVE | Facility: CLINIC | Age: 66
End: 2021-05-04

## 2021-05-07 LAB
FINAL PATHOLOGIC DIAGNOSIS: NORMAL
GROSS: NORMAL
Lab: NORMAL

## 2021-05-10 NOTE — CONSULTS
[facility] -   Nephrology  Consult Note        Patient Name: Yan Choudhury  MRN: [id number]  Admission Date: 12/11/2018  Hospital Length of Stay: 0 days  Attending Provider: Kevin Lindsay MD   Primary Care Physician: Sandra Estevez MD  Principal Problem:Complication of surgical procedure    Consults  Subjective:     HPI: Patient is a 63-year-old male with history of acute kidney injury in November of 2018 and was seen and managed by myself.  His acute kidney injury responded very well with IV fluids.  He was seen in the outpatient clinic by Dr. Berg and his creatinine had improved.  Patient is now admitted back with recurrent acute kidney injury and hyponatremia due to dehydration.  Patient's has good sleep and appetite no fevers and chills.  No evidence of sepsis.  No evidence of infection.  Patient has no symptoms of CHF either.  Patient has been started on normal saline.  We stopped his ACE-inhibitor and diuretics.  His creatinine on admission was 4.6 which has improved down to 4.0 today.  We will continue IV fluids today.  Rest of his HPI as copied from the previous consultation in November of 2018 is as follows:    Patient is a 63-year-old male with history of chronic kidney disease followed by our division in the outpatient clinic.  Patient has been seen by Dr. Seth and Dr. Juan Luis Galeas in the past.  Patient has had history of diverticular perforation requiring colostomy.  Patient is status post colostomy reversal as well as repair of paraostomy hernia repair.  Patient was recently hospitalized with dehydration.  Patient also has history of congestive heart failure followed by Dr. Calvillo as an outpatient.  Patient takes metolazone as an outpatient for diuretics.  Patient was seen by Dr. Lindsay as an outpatient today and was admitted for dehydration.  Patient's baseline creatinine ranged between 1.8 and 2.1.  Patient never had any proteinuria.  His creatinine has gone up to 8.2 with a BUN  elevation as well as hyponatremia.  Nephrology consultation is requested for acute kidney injury on chronic kidney disease stage 3 4/stage IV.         Past Medical History:   Diagnosis Date    Arthritis     CAD (coronary artery disease)     Cataract     CHF (congestive heart failure)     Dr Calvillo    Chronic combined systolic and diastolic congestive heart failure 3/24/2015    CKD (chronic kidney disease), stage III     Diabetes mellitus type II      AM    Diabetic retinopathy     anti Veg F injections for macular edema    Diverticulitis of colon     ED (erectile dysfunction)     Glaucoma     HTN (hypertension)     Hyperlipidemia     Ischemic cardiomyopathy     Obesity     JAHAIRA on CPAP     Pacemaker     Pulmonary HTN        Past Surgical History:   Procedure Laterality Date    CARDIAC CATHETERIZATION  2009    CHOLECYSTECTOMY      COLECTOMY-SIGMOID N/A 4/22/2016    Performed by Kevin Lindsay MD at [facility] OR    COLONOSCOPY N/A 10/11/2018    Procedure: COLONOSCOPY;  Surgeon: Quinn Aragon MD;  Location: [facility] ENDO;  Service: General;  Laterality: N/A;    COLONOSCOPY N/A 10/11/2018    Performed by Quinn Aragon MD at [facility] ENDO    COLOSTOMY N/A 4/22/2016    [facility] by Kevin [facility] at [facility] OR    CREATION, ILEOSTOMY N/A 10/30/2018    [facility] by [facility] at [facility] OR    EYE SURGERY      HEMICOLECTOMY, RIGHT Right 10/30/2018    [facility] by Kevin [facility] at [facility] OR    ILEOSTOMY N/A 10/30/2018    Procedure: CREATION, ILEOSTOMY;  Surgeon: Kevin Lindsay MD;  Location: [facility] OR;  Service: General;  Laterality: N/A;    KNEE SURGERY      MOBILIZATION OF SPLENIC FLEXURE N/A 10/30/2018    Procedure: MOBILIZATION, SPLENIC FLEXURE;  Surgeon: Kevin Lindsay MD;  Location: [facility] OR;  Service: General;  Laterality: N/A;    MOBILIZATION, SPLENIC FLEXURE N/A 10/30/2018    Performed by Kevin Lindsay [facility] at [facility] OR    REVISION COLOSTOMY N/A 10/30/2018     Procedure: REVISION OR CLOSURE, COLOSTOMY;  Surgeon: Kevin Lindsay MD;  Location: [facility] OR;  Service: General;  Laterality: N/A;  Parastomal Hernia Repair    REVISION OR CLOSURE, COLOSTOMY N/A 10/30/2018    Performed by Kevin Lindsay MD at [facility] OR    RIGHT HEMICOLECTOMY Right 10/30/2018    Procedure: HEMICOLECTOMY, RIGHT;  Surgeon: Kevin Lindsay MD;  Location: [facility] OR;  Service: General;  Laterality: Right;       Review of patient's allergies indicates:  No Known Allergies  Current Facility-Administered Medications   Medication Frequency    0.9%  NaCl infusion Continuous    acetaminophen tablet 650 mg Q8H PRN    allopurinol tablet 100 mg Daily    aspirin EC tablet 81 mg QAM    bimatoprost 0.01 % ophthalmic drops 1 drop QHS    carvedilol tablet 25 mg Daily    dextrose 50% injection 12.5 g PRN    dextrose 50% injection 25 g PRN    diphenhydrAMINE capsule 25 mg Q4H PRN    diphenoxylate-atropine 2.5-0.025 mg per tablet 1 tablet QID    enoxaparin injection 30 mg Daily    glucagon (human recombinant) injection 1 mg PRN    glucose chewable tablet 16 g PRN    glucose chewable tablet 24 g PRN    HYDROcodone-acetaminophen 5-325 mg per tablet 1 tablet Q6H PRN    insulin aspart U-100 pen 1-10 Units QID (AC + HS) PRN    insulin detemir U-100 pen 15 Units Daily    neomycin-bacitracin-polymyxin ointment BID    ondansetron disintegrating tablet 8 mg Q8H PRN    pantoprazole EC tablet 40 mg Daily    ramelteon tablet 8 mg Nightly PRN    simvastatin tablet 10 mg QHS    sodium chloride 0.9% flush 3 mL PRN     Family History     Problem Relation (Age of Onset)    Cancer Mother    Heart disease Father    No Known Problems Sister, Brother, Maternal Aunt, Maternal Uncle, Paternal Aunt, Paternal Uncle, Maternal Grandmother, Maternal Grandfather, Paternal Grandmother, Paternal Grandfather    Stroke Father        Tobacco Use    Smoking status: Never Smoker    Smokeless tobacco: Never Used   Substance and  Sexual Activity    Alcohol use: Yes     Alcohol/week: 0.0 oz     Comment: NO ALCOHOL 72 HOURS PRIOR TO SX    Drug use: No    Sexual activity: Not Currently     Review of Systems   Constitutional: Positive for unexpected weight change. Negative for activity change, appetite change, chills, diaphoresis, fatigue and fever.   HENT: Negative.  Negative for congestion and trouble swallowing.    Eyes: Negative.    Respiratory: Negative.  Negative for cough, chest tightness, shortness of breath and wheezing.    Cardiovascular: Negative.  Negative for chest pain, palpitations and leg swelling.   Gastrointestinal: Negative.  Negative for abdominal distention, abdominal pain, nausea and vomiting.   Genitourinary: Negative.  Negative for decreased urine volume, difficulty urinating, dysuria, enuresis, flank pain, frequency, hematuria, penile swelling, scrotal swelling and urgency.   Musculoskeletal: Negative.  Negative for arthralgias, back pain, joint swelling and neck pain.   Skin: Negative for rash.   Neurological: Negative.  Negative for tremors, seizures and headaches.   Psychiatric/Behavioral: Negative.  Negative for confusion and sleep disturbance. The patient is not nervous/anxious.      Objective:     Vital Signs (Most Recent):  Temp: 97 °F (36.1 °C) (12/12/18 1213)  Pulse: 101 (12/12/18 1213)  Resp: 16 (12/12/18 1213)  BP: 102/63 (12/12/18 1213)  SpO2: 98 % (12/12/18 1213)  O2 Device (Oxygen Therapy): room air (12/12/18 0835) Vital Signs (24h Range):  Temp:  [97 °F (36.1 °C)-98.2 °F (36.8 °C)] 97 °F (36.1 °C)  Pulse:  [] 101  Resp:  [16-20] 16  SpO2:  [96 %-100 %] 98 %  BP: ()/(61-69) 102/63     Weight: 78.2 kg (172 lb 6.4 oz) (12/11/18 1942)  Body mass index is 26.21 kg/m².  Body surface area is 1.94 meters squared.    I/O last 3 completed shifts:  In: 120 [P.O.:120]  Out: 200 [Urine:200]    Physical Exam   Constitutional: He is oriented to person, place, and time. He appears well-developed and  well-nourished. No distress.   Chronically ill   HENT:   Head: Normocephalic.   Eyes: EOM are normal. Pupils are equal, round, and reactive to light.   Neck: Normal range of motion. Neck supple. No JVD present. No thyromegaly present.   Cardiovascular: Normal rate, regular rhythm, S1 normal, S2 normal and intact distal pulses. PMI is not displaced. Exam reveals no gallop and no friction rub.   Murmur heard.  No evidence of CHF at this time.   Pulmonary/Chest: Effort normal and breath sounds normal. No respiratory distress. He has no wheezes. He has no rales. He exhibits no tenderness.   Abdominal: Soft. Bowel sounds are normal. He exhibits no distension and no mass. There is no hepatosplenomegaly. There is no tenderness. There is no rebound and no CVA tenderness. No hernia.   There is a drain in the lower abdomen with serosanguineous output.  There is a old colostomy site with granulation tissue.  There is a well-formed right upper quadrant loop ileostomy   Musculoskeletal: Normal range of motion. He exhibits no edema or tenderness.   Lymphadenopathy:     He has no cervical adenopathy.   Neurological: He is alert and oriented to person, place, and time. He has normal reflexes. He is not disoriented. He displays normal reflexes. No cranial nerve deficit. He exhibits normal muscle tone. Coordination normal.   Skin: Skin is warm and dry. Capillary refill takes less than 2 seconds. No rash noted. No erythema.   Psychiatric: He has a normal mood and affect. His behavior is normal. Judgment and thought content normal.   Nursing note and vitals reviewed.      Significant Labs:  All labs within the past 24 hours have been reviewed.    Significant Imaging:  Labs: Reviewed    Assessment/Plan:     Acute renal failure    Acute kidney injury most likely due to dehydration.  Agree with IV fluids.  Continue with normal saline today.  Hold Ace inhibitor and diuretics for now.  No evidence of CHF by exam.  Urine sodium is less than  20.  Fractional excretion of sodium is less than 1%.  Patient seems to be behaving like previous admission of severe dehydration.         Thank you for your consult.     Sandoval Damico MD   Nephrology  [facility]   pt received to , awake and alert, calm and cooperative, sent to ED by mother who is concerned that pt is aggressive at home.  pt calm and cooperative in ED, denies SI/HI/AVH.  mother feels she needs help in caring for him at home.  pt offers no complaints

## 2022-02-16 NOTE — TELEPHONE ENCOUNTER
Morena, please call to see how patient is feeling this AM. His BNP is significantly elevated, > 2,000. He refused ED yesterday but if he has not had improvement in symptoms overnight, I would recommend ED evaluation and admission for IV diuresis.    If he is feeling better, he can take an additional metolazone today 30 minutes prior to Bumex dosing. Stay on 1 mg (1/2) tab BID x next 2 days.    Phone review on Friday       no

## 2022-08-05 NOTE — PATIENT INSTRUCTIONS
Recommend abdominal binder.  OK to cut hole for colostomy bag.    Available on [employer]      [employer]  [address], [place] [address]    [employer]  [address], [place] [address]  
96

## 2023-07-11 NOTE — ASSESSMENT & PLAN NOTE
No acute surgical intervention at this time  Continue KINDRA drain to bulb sxn  Will attempt to allow leak to naturally heal with fecal diversion with ileostomy   Refill Request   Return in about 6 months (around 11/18/2023). Last Seen: Last Seen Department: 5/18/2023  Last Seen by PCP: 5/18/2023    Last Written: 1/10/2023 #90 with 1    Next Appointment:   Future Appointments   Date Time Provider [address]   8/22/2023 10:00 AM [facility] ED [facility]   8/29/2023 10:15 AM MD SAADIA Lozano [facility]       Message to  to schedule appointment.          Requested Prescriptions     Pending Prescriptions Disp Refills    atorvastatin (LIPITOR) 20 MG tablet [Pharmacy Med Name: ATORVASTATIN 20 MG TABLET] 90 tablet 1     Sig: TAKE ONE TABLET BY MOUTH DAILY

## 2023-11-04 NOTE — TELEPHONE ENCOUNTER
I spoke with pts wife. She says his wt is about the same at 231lb. She says the wt isnt going down like they thought it would taking the bumex twice daily. He is having increase in UOP.   Still having productive cough with clear/yellow sputum.   Sob about the same.      Yes

## 2024-08-22 NOTE — PROGRESS NOTES
[facility] -   General Surgery  Progress Note    Subjective:     History of Present Illness:  Patient is a 63 y.o. male presents after a colostomy reversal, repair of parastomal hernia and protective ileostomy.     The patient states he is doing okay but he is a bit tired and fatigued he states that purulent material started coming out of his drain about 2 days ago.  He denies any fever or chills.  He states that his ileostomy output is more formed.  The patient was admitted from clinic once we noticed that his BUN and creatinine were significantly elevated        Post-Op Info:  * No surgery found *         Interval History:  Patient is feeling better.  Output from the colostomy is becoming more solid, drain output is slightly clear.    Medications:  Continuous Infusions:   sodium chloride 0.9% 100 mL/hr at 11/20/18 0937     Scheduled Meds:   allopurinol  100 mg Oral Daily    carvedilol  12.5 mg Oral BID    ciprofloxacin HCl  500 mg Oral Daily    enoxaparin  30 mg Subcutaneous Daily    lidocaine (PF) 10 mg/ml (1%)  1 mL Other Once    loperamide  2 mg Oral QID (PC + HS)    metroNIDAZOLE  500 mg Oral Q8H    pantoprazole  40 mg Oral Daily    psyllium husk  1 packet Oral Daily    simvastatin  10 mg Oral QHS     PRN Meds:sodium chloride, acetaminophen, dextrose 50%, dextrose 50%, glucagon (human recombinant), glucose, glucose, hydrALAZINE, insulin aspart U-100, ondansetron     Review of patient's allergies indicates:  No Known Allergies  Objective:     Vital Signs (Most Recent):  Temp: 98.3 °F (36.8 °C) (11/20/18 0800)  Pulse: 86 (11/20/18 0800)  Resp: 18 (11/20/18 0800)  BP: 112/62 (11/20/18 0800)  SpO2: 97 % (11/20/18 0800) Vital Signs (24h Range):  Temp:  [98.3 °F (36.8 °C)-100.1 °F (37.8 °C)] 98.3 °F (36.8 °C)  Pulse:  [74-97] 86  Resp:  [18] 18  SpO2:  [96 %-99 %] 97 %  BP: (104-131)/(57-73) 112/62     Weight: 92.4 kg (203 lb 11.3 oz)  Body mass index is 30.97 kg/m².    Intake/Output - Last  3 Shifts       11/18 0700 - 11/19 0659 11/19 0700 - 11/20 0659 11/20 0700 - 11/21 0659    P.O. 720 480     I.V. (mL/kg)  1098.3 (11.9)     Blood 381.7      Other  200     IV Piggyback       Total Intake(mL/kg) 1101.7 (11.9) 1778.3 (19.2)     Urine (mL/kg/hr) 2150 (1) 1600 (0.7)     Drains 125 105     Other 50      Stool 1600 740 150    Total Output 3925 2445 150    Net -2823.3 -666.7 -150                 Physical Exam   Constitutional: He is oriented to person, place, and time. No distress.   Chronically ill   HENT:   Head: Normocephalic.   Neck: Neck supple.   Cardiovascular: Normal rate, regular rhythm and normal heart sounds.   Pulmonary/Chest: Effort normal and breath sounds normal.   Abdominal: Soft.   Right upper quadrant ileostomy, left-sided wound VAC, drain with slightly purulent output, the drain was flushed   Neurological: He is alert and oriented to person, place, and time.   Skin: Skin is warm.   Psychiatric: He has a normal mood and affect. His behavior is normal. Thought content normal.   Vitals reviewed.      Significant Labs:  CBC:   Recent Labs   Lab 11/19/18  0437   WBC 10.22   RBC 3.40*   HGB 9.3*   HCT 28.2*      MCV 83   MCH 27.4   MCHC 33.0     BMP:   Recent Labs   Lab 11/19/18 0437 11/20/18  0435   * 96   * 136   K 4.5 4.9    113*   CO2 18* 17*   BUN 54* 37*   CREATININE 3.4* 2.3*   CALCIUM 8.5* 8.4*   MG 1.7  --        Significant Diagnostics:  None    Assessment/Plan:     * Open wound of anterior abdominal wall with complication    Wound VAC, wound VAC changes Monday Wednesday and Friday.  Wound care consult      Gastrointestinal anastomotic leak    No acute surgical intervention at this time  Continue KINDRA drain to bulb sxn  Will attempt to allow leak to naturally heal with fecal diversion with ileostomy     Acute on chronic renal failure    Management per Nephrology consult  BUN and creatinine are improving.    Continue IV fluids per Nephrology     Ileostomy in  Hypothyroidism place    He is on Metamucil and 2 mg loperamide Q 6 hr.  The ileostomy output needs to be monitored to ensure it is becoming semi soft      I feel a needs to have less than 1 L a day of ileostomy output before discharge.  The patient and his wife would need to have a better understanding of collecting and monitoring the ileostomy output     Chronic combined systolic and diastolic congestive heart failure    Cardiology and Hospital Medicine consulted  Transfuse 1unit pRBC today per cards recs     Gastroesophageal reflux disease    Continue PPI      Type 2 diabetes mellitus with stage 4 chronic kidney disease, with long-term current use of insulin    Hospital medicine and nephrology consulted      Ischemic cardiomyopathy    Cardiology consult for further management       Hypertension associated with diabetes    Hospital Medicine consult for management          Kevin Lindsay MD  General Surgery  [facility] -    Dementia Hypothyroidism Dementia Hypothyroidism Hypothyroidism Hypothyroidism Dementia Hypothyroidism

## (undated) DEVICE — GAUZE SPONGE 4X4 12PLY

## (undated) DEVICE — SEE MEDLINE ITEM 152522

## (undated) DEVICE — CONTRAST OMNIPAQUE 240 150ML

## (undated) DEVICE — Device

## (undated) DEVICE — SUT PROLENE 1 CTX 30IN BLUE

## (undated) DEVICE — SUT 1 36IN PDS II VIO MONO

## (undated) DEVICE — ELECTRODE REM PLYHSV RETURN 9

## (undated) DEVICE — PAD ABD 8X10 STERILE

## (undated) DEVICE — SHEATH FLEXOR ANSEL 5FR 90M

## (undated) DEVICE — STAPLER CIRCULAR XL SEAL 29MM

## (undated) DEVICE — SEE MEDLINE ITEM 157027

## (undated) DEVICE — BLADE SCALP OPHTL RND TIP

## (undated) DEVICE — COVER OVERHEAD SURG LT BLUE

## (undated) DEVICE — SEE MEDLINE ITEM 152622

## (undated) DEVICE — SHEET DRAPE FAN-FOLDED 3/4

## (undated) DEVICE — SUPPORT ULNA NERVE PROTECTOR

## (undated) DEVICE — DEVICE ENSEAL X1 LARGE JAW

## (undated) DEVICE — GLOVE PROTEXIS HYDROGEL SZ7.5

## (undated) DEVICE — STOCKINET TUBULAR 1 PLY 6X60IN

## (undated) DEVICE — CONTAINER SPECIMEN STRL 4OZ

## (undated) DEVICE — BLADE LONG 31.0MM X 9.0MM

## (undated) DEVICE — SUT SILK 3-0 SH 18IN BLACK

## (undated) DEVICE — WIRE ASAHI ASTATO 30 300CM

## (undated) DEVICE — EVACUATOR WOUND BULB 100CC

## (undated) DEVICE — TAPE SURG MEDIPORE 6X72IN

## (undated) DEVICE — SEE MEDLINE ITEM 146420

## (undated) DEVICE — SUT VICRYL 3-0 27 SH

## (undated) DEVICE — CATH ANGIO DIAG RIM 5X65

## (undated) DEVICE — SEE MEDLINE ITEM 157117

## (undated) DEVICE — SUT 2/0 30IN SILK BLK BRAI

## (undated) DEVICE — KIT FIBRIN SEALANT EVICEL 5 ML

## (undated) DEVICE — TAPE MEDIPORE 4IN X 2YDS

## (undated) DEVICE — ELECTRODE BLD EXT 6.50 ST DISP

## (undated) DEVICE — KIT MANIFOLD LOW PRESS TUBING

## (undated) DEVICE — SPONGE DERMACEA GAUZE 4X4

## (undated) DEVICE — DRAPE FLUID WARMER ORS 44X44IN

## (undated) DEVICE — SUT 4-0 ETHILON 18 PS-2

## (undated) DEVICE — SUT 1 48IN PDS II VIO MONO

## (undated) DEVICE — SOL NS 1000CC

## (undated) DEVICE — RELOAD PROXIMATE CUT BLUE 75MM

## (undated) DEVICE — MANIFOLD 4 PORT

## (undated) DEVICE — APPLICATOR CHLORAPREP ORN 26ML

## (undated) DEVICE — SUT VICRYL PLUS 0 CT1 36IN

## (undated) DEVICE — DECANTER VIAL ASEPTIC TRANSFER

## (undated) DEVICE — SUT VICRYL PLUS 3-0 SH 18IN

## (undated) DEVICE — CATH ANGIO SOFT VU 5FR 65CM

## (undated) DEVICE — CUTTER PROXIMATE BLUE 75MM

## (undated) DEVICE — SEE MEDLINE ITEM 152739

## (undated) DEVICE — SYR 3CC LUER LOC

## (undated) DEVICE — WIRE GUIDE TEFLON 3CM .035 145

## (undated) DEVICE — CATH GLIDE ANGLED 5FR 65CM

## (undated) DEVICE — SUT SILK 3-0 STRANDS 30IN

## (undated) DEVICE — SUT SILK 2-0 STRANDS 30IN

## (undated) DEVICE — WIRE ASAHI TREASURE 12 300CM

## (undated) DEVICE — ANGIOTOUCH KIT

## (undated) DEVICE — POSITIONER HEAD DONUT 9IN FOAM

## (undated) DEVICE — SOL 9P NACL IRR PIC IL

## (undated) DEVICE — PAD CAST SPECIALIST STRL 4

## (undated) DEVICE — POUCH OUTLETSENSURA MIO WIDE

## (undated) DEVICE — SUT SILK 0 SUTUPAK SA86H

## (undated) DEVICE — WARMER DRAPE STERILE LF

## (undated) DEVICE — SHEATH INTRODUCER 5FR 10CM

## (undated) DEVICE — GUIDEWIRE STF .035X260CM ANG

## (undated) DEVICE — SEE MEDLINE ITEM 157148

## (undated) DEVICE — GUIDEWIRE STD .035X260CM ANG

## (undated) DEVICE — GLOVE SURG BIOGEL LATEX SZ 7.5

## (undated) DEVICE — SPONGE LAP 18X18 PREWASHED

## (undated) DEVICE — PACK CATH LAB CUSTOM BR

## (undated) DEVICE — SUT SILK 0 SH 30IN BLK BR

## (undated) DEVICE — SEE MEDLINE ITEM 146345

## (undated) DEVICE — GUIDEWIRE WHOLEY HI TORQ 175CM

## (undated) DEVICE — NDL SAFETY 25G X 1.5 ECLIPSE

## (undated) DEVICE — ELECTRODE BLADE E-Z CLEAN 4IN

## (undated) DEVICE — SEE MEDLINE ITEM 146308

## (undated) DEVICE — DRAPE SURG W/TWL 17 5/8X23

## (undated) DEVICE — SUT PROLENE 2-0 SH 36IN BLU

## (undated) DEVICE — GUIDEWIRE STF .035X260CM STR

## (undated) DEVICE — NDL HYPODERMIC BLUNT 18G 1.5IN

## (undated) DEVICE — GUIDEWIRE ADVNTG 018X300CM ANG

## (undated) DEVICE — GLOVE 8 PROTEXIS PI ORTHO

## (undated) DEVICE — BLADE SURG CARBON STEEL #10

## (undated) DEVICE — SUT ETHILON 2-0 BLK PS-2

## (undated) DEVICE — TUBING SET ANGIASSIST MED GAS

## (undated) DEVICE — SUT VICRYL 2-0 27 CT-1

## (undated) DEVICE — SEE MEDLINE ITEM 157181

## (undated) DEVICE — PACK DRAPE PERI/GYN TIBURON

## (undated) DEVICE — CATH NAVICROSS STR .035X150CM

## (undated) DEVICE — SEE MEDLINE ITEM 146231

## (undated) DEVICE — TIP SUC SIGMOIDOSCOPE 18F 12IN

## (undated) DEVICE — TOWEL WHITE OR DISP

## (undated) DEVICE — CATH NAVICROSS .035X150 ANGLE

## (undated) DEVICE — BLADE SURG #15 CARBON STEEL

## (undated) DEVICE — SEE MEDLINE ITEM 146298

## (undated) DEVICE — INTERPULSE SET

## (undated) DEVICE — KIT SYR REUSABLE

## (undated) DEVICE — SYR 10CC LUER LOCK

## (undated) DEVICE — STAPLER CONTOUR 40MM GREEN